# Patient Record
Sex: MALE | Race: WHITE | NOT HISPANIC OR LATINO | Employment: OTHER | URBAN - METROPOLITAN AREA
[De-identification: names, ages, dates, MRNs, and addresses within clinical notes are randomized per-mention and may not be internally consistent; named-entity substitution may affect disease eponyms.]

---

## 2021-11-07 ENCOUNTER — HOSPITAL ENCOUNTER (INPATIENT)
Facility: MEDICAL CENTER | Age: 56
LOS: 1 days | DRG: 637 | End: 2021-11-08
Attending: EMERGENCY MEDICINE | Admitting: INTERNAL MEDICINE
Payer: COMMERCIAL

## 2021-11-07 ENCOUNTER — APPOINTMENT (OUTPATIENT)
Dept: RADIOLOGY | Facility: MEDICAL CENTER | Age: 56
DRG: 637 | End: 2021-11-07
Attending: EMERGENCY MEDICINE
Payer: COMMERCIAL

## 2021-11-07 DIAGNOSIS — R10.12 LEFT UPPER QUADRANT ABDOMINAL PAIN: ICD-10-CM

## 2021-11-07 DIAGNOSIS — E10.10 DIABETIC KETOACIDOSIS WITHOUT COMA ASSOCIATED WITH TYPE 1 DIABETES MELLITUS (HCC): ICD-10-CM

## 2021-11-07 DIAGNOSIS — R19.7 NAUSEA VOMITING AND DIARRHEA: ICD-10-CM

## 2021-11-07 DIAGNOSIS — R11.2 NAUSEA VOMITING AND DIARRHEA: ICD-10-CM

## 2021-11-07 PROBLEM — E87.20 METABOLIC ACIDOSIS: Status: ACTIVE | Noted: 2021-11-07

## 2021-11-07 PROBLEM — Z91.148 NONCOMPLIANCE WITH MEDICATIONS: Status: ACTIVE | Noted: 2021-11-07

## 2021-11-07 PROBLEM — E11.00 DM HYPEROSMOLARITY TYPE II (HCC): Status: ACTIVE | Noted: 2021-11-07

## 2021-11-07 PROBLEM — D72.829 LEUKOCYTOSIS: Status: ACTIVE | Noted: 2021-11-07

## 2021-11-07 PROBLEM — E11.10 DKA (DIABETIC KETOACIDOSIS) (HCC): Status: ACTIVE | Noted: 2021-11-07

## 2021-11-07 PROBLEM — N18.30 CKD (CHRONIC KIDNEY DISEASE) STAGE 3, GFR 30-59 ML/MIN: Status: ACTIVE | Noted: 2021-11-07

## 2021-11-07 LAB
ALBUMIN SERPL BCP-MCNC: 3.8 G/DL (ref 3.2–4.9)
ALBUMIN/GLOB SERPL: 1.4 G/DL
ALP SERPL-CCNC: 101 U/L (ref 30–99)
ALT SERPL-CCNC: 30 U/L (ref 2–50)
ANION GAP SERPL CALC-SCNC: 30 MMOL/L (ref 7–16)
APPEARANCE UR: CLEAR
AST SERPL-CCNC: 18 U/L (ref 12–45)
BACTERIA #/AREA URNS HPF: ABNORMAL /HPF
BASOPHILS # BLD AUTO: 0.2 % (ref 0–1.8)
BASOPHILS # BLD: 0.03 K/UL (ref 0–0.12)
BILIRUB SERPL-MCNC: 0.6 MG/DL (ref 0.1–1.5)
BILIRUB UR QL STRIP.AUTO: ABNORMAL
BUN SERPL-MCNC: 26 MG/DL (ref 8–22)
CALCIUM SERPL-MCNC: 9.3 MG/DL (ref 8.4–10.2)
CHLORIDE SERPL-SCNC: 88 MMOL/L (ref 96–112)
CO2 SERPL-SCNC: 17 MMOL/L (ref 20–33)
COLOR UR: YELLOW
CREAT SERPL-MCNC: 1.39 MG/DL (ref 0.5–1.4)
EKG IMPRESSION: NORMAL
EOSINOPHIL # BLD AUTO: 0.02 K/UL (ref 0–0.51)
EOSINOPHIL NFR BLD: 0.2 % (ref 0–6.9)
EPI CELLS #/AREA URNS HPF: NEGATIVE /HPF
ERYTHROCYTE [DISTWIDTH] IN BLOOD BY AUTOMATED COUNT: 38.3 FL (ref 35.9–50)
GLOBULIN SER CALC-MCNC: 2.8 G/DL (ref 1.9–3.5)
GLUCOSE BLD-MCNC: 283 MG/DL (ref 65–99)
GLUCOSE SERPL-MCNC: 391 MG/DL (ref 65–99)
GLUCOSE UR STRIP.AUTO-MCNC: >=1000 MG/DL
HCT VFR BLD AUTO: 49.9 % (ref 42–52)
HGB BLD-MCNC: 16.5 G/DL (ref 14–18)
IMM GRANULOCYTES # BLD AUTO: 0.07 K/UL (ref 0–0.11)
IMM GRANULOCYTES NFR BLD AUTO: 0.5 % (ref 0–0.9)
KETONES UR STRIP.AUTO-MCNC: >=80 MG/DL
LEUKOCYTE ESTERASE UR QL STRIP.AUTO: NEGATIVE
LIPASE SERPL-CCNC: 11 U/L (ref 7–58)
LYMPHOCYTES # BLD AUTO: 1.17 K/UL (ref 1–4.8)
LYMPHOCYTES NFR BLD: 9 % (ref 22–41)
MAGNESIUM SERPL-MCNC: 2.1 MG/DL (ref 1.5–2.5)
MCH RBC QN AUTO: 27.8 PG (ref 27–33)
MCHC RBC AUTO-ENTMCNC: 33.1 G/DL (ref 33.7–35.3)
MCV RBC AUTO: 84.1 FL (ref 81.4–97.8)
MICRO URNS: ABNORMAL
MONOCYTES # BLD AUTO: 0.54 K/UL (ref 0–0.85)
MONOCYTES NFR BLD AUTO: 4.2 % (ref 0–13.4)
MUCOUS THREADS #/AREA URNS HPF: ABNORMAL /HPF
NEUTROPHILS # BLD AUTO: 11.1 K/UL (ref 1.82–7.42)
NEUTROPHILS NFR BLD: 85.9 % (ref 44–72)
NITRITE UR QL STRIP.AUTO: NEGATIVE
NRBC # BLD AUTO: 0 K/UL
NRBC BLD-RTO: 0 /100 WBC
PH UR STRIP.AUTO: 5 [PH] (ref 5–8)
PHOSPHATE SERPL-MCNC: 5.3 MG/DL (ref 2.5–4.5)
PLATELET # BLD AUTO: 265 K/UL (ref 164–446)
PMV BLD AUTO: 8.9 FL (ref 9–12.9)
POTASSIUM SERPL-SCNC: 4.1 MMOL/L (ref 3.6–5.5)
PROT SERPL-MCNC: 6.6 G/DL (ref 6–8.2)
PROT UR QL STRIP: NEGATIVE MG/DL
RBC # BLD AUTO: 5.93 M/UL (ref 4.7–6.1)
RBC # URNS HPF: ABNORMAL /HPF
RBC UR QL AUTO: ABNORMAL
SODIUM SERPL-SCNC: 135 MMOL/L (ref 135–145)
SP GR UR STRIP.AUTO: 1.02
TSH SERPL DL<=0.005 MIU/L-ACNC: 1.68 UIU/ML (ref 0.38–5.33)
WBC # BLD AUTO: 12.9 K/UL (ref 4.8–10.8)

## 2021-11-07 PROCEDURE — 85025 COMPLETE CBC W/AUTO DIFF WBC: CPT

## 2021-11-07 PROCEDURE — 81001 URINALYSIS AUTO W/SCOPE: CPT

## 2021-11-07 PROCEDURE — 700105 HCHG RX REV CODE 258: Performed by: INTERNAL MEDICINE

## 2021-11-07 PROCEDURE — 99285 EMERGENCY DEPT VISIT HI MDM: CPT

## 2021-11-07 PROCEDURE — 770020 HCHG ROOM/CARE - TELE (206)

## 2021-11-07 PROCEDURE — 700102 HCHG RX REV CODE 250 W/ 637 OVERRIDE(OP): Performed by: EMERGENCY MEDICINE

## 2021-11-07 PROCEDURE — 80053 COMPREHEN METABOLIC PANEL: CPT

## 2021-11-07 PROCEDURE — 700102 HCHG RX REV CODE 250 W/ 637 OVERRIDE(OP): Performed by: INTERNAL MEDICINE

## 2021-11-07 PROCEDURE — 93005 ELECTROCARDIOGRAM TRACING: CPT | Performed by: EMERGENCY MEDICINE

## 2021-11-07 PROCEDURE — 83735 ASSAY OF MAGNESIUM: CPT

## 2021-11-07 PROCEDURE — 74177 CT ABD & PELVIS W/CONTRAST: CPT

## 2021-11-07 PROCEDURE — 700105 HCHG RX REV CODE 258: Performed by: EMERGENCY MEDICINE

## 2021-11-07 PROCEDURE — 96376 TX/PRO/DX INJ SAME DRUG ADON: CPT

## 2021-11-07 PROCEDURE — 96372 THER/PROPH/DIAG INJ SC/IM: CPT

## 2021-11-07 PROCEDURE — 83690 ASSAY OF LIPASE: CPT

## 2021-11-07 PROCEDURE — 82962 GLUCOSE BLOOD TEST: CPT

## 2021-11-07 PROCEDURE — 84443 ASSAY THYROID STIM HORMONE: CPT

## 2021-11-07 PROCEDURE — 96375 TX/PRO/DX INJ NEW DRUG ADDON: CPT

## 2021-11-07 PROCEDURE — 96374 THER/PROPH/DIAG INJ IV PUSH: CPT

## 2021-11-07 PROCEDURE — 83036 HEMOGLOBIN GLYCOSYLATED A1C: CPT

## 2021-11-07 PROCEDURE — 700111 HCHG RX REV CODE 636 W/ 250 OVERRIDE (IP): Performed by: EMERGENCY MEDICINE

## 2021-11-07 PROCEDURE — 99223 1ST HOSP IP/OBS HIGH 75: CPT | Mod: AI | Performed by: INTERNAL MEDICINE

## 2021-11-07 PROCEDURE — 700117 HCHG RX CONTRAST REV CODE 255: Performed by: EMERGENCY MEDICINE

## 2021-11-07 PROCEDURE — 84100 ASSAY OF PHOSPHORUS: CPT

## 2021-11-07 PROCEDURE — 82010 KETONE BODYS QUAN: CPT

## 2021-11-07 RX ORDER — PROCHLORPERAZINE EDISYLATE 5 MG/ML
5-10 INJECTION INTRAMUSCULAR; INTRAVENOUS EVERY 4 HOURS PRN
Status: DISCONTINUED | OUTPATIENT
Start: 2021-11-07 | End: 2021-11-08 | Stop reason: HOSPADM

## 2021-11-07 RX ORDER — OXYCODONE HYDROCHLORIDE 5 MG/1
2.5 TABLET ORAL
Status: DISCONTINUED | OUTPATIENT
Start: 2021-11-07 | End: 2021-11-08 | Stop reason: HOSPADM

## 2021-11-07 RX ORDER — HYDROMORPHONE HYDROCHLORIDE 1 MG/ML
0.25 INJECTION, SOLUTION INTRAMUSCULAR; INTRAVENOUS; SUBCUTANEOUS
Status: DISCONTINUED | OUTPATIENT
Start: 2021-11-07 | End: 2021-11-08 | Stop reason: HOSPADM

## 2021-11-07 RX ORDER — PROMETHAZINE HYDROCHLORIDE 25 MG/1
12.5-25 SUPPOSITORY RECTAL EVERY 4 HOURS PRN
Status: DISCONTINUED | OUTPATIENT
Start: 2021-11-07 | End: 2021-11-08 | Stop reason: HOSPADM

## 2021-11-07 RX ORDER — MORPHINE SULFATE 4 MG/ML
4 INJECTION, SOLUTION INTRAMUSCULAR; INTRAVENOUS ONCE
Status: COMPLETED | OUTPATIENT
Start: 2021-11-07 | End: 2021-11-07

## 2021-11-07 RX ORDER — ONDANSETRON 2 MG/ML
4 INJECTION INTRAMUSCULAR; INTRAVENOUS ONCE
Status: COMPLETED | OUTPATIENT
Start: 2021-11-07 | End: 2021-11-07

## 2021-11-07 RX ORDER — POLYETHYLENE GLYCOL 3350 17 G/17G
1 POWDER, FOR SOLUTION ORAL
Status: DISCONTINUED | OUTPATIENT
Start: 2021-11-07 | End: 2021-11-08 | Stop reason: HOSPADM

## 2021-11-07 RX ORDER — AMOXICILLIN 250 MG
2 CAPSULE ORAL 2 TIMES DAILY
Status: DISCONTINUED | OUTPATIENT
Start: 2021-11-08 | End: 2021-11-08 | Stop reason: HOSPADM

## 2021-11-07 RX ORDER — SODIUM CHLORIDE, SODIUM LACTATE, POTASSIUM CHLORIDE, CALCIUM CHLORIDE 600; 310; 30; 20 MG/100ML; MG/100ML; MG/100ML; MG/100ML
2000 INJECTION, SOLUTION INTRAVENOUS ONCE
Status: COMPLETED | OUTPATIENT
Start: 2021-11-07 | End: 2021-11-07

## 2021-11-07 RX ORDER — HEPARIN SODIUM 5000 [USP'U]/ML
5000 INJECTION, SOLUTION INTRAVENOUS; SUBCUTANEOUS EVERY 8 HOURS
Status: DISCONTINUED | OUTPATIENT
Start: 2021-11-08 | End: 2021-11-08

## 2021-11-07 RX ORDER — DEXTROSE MONOHYDRATE 25 G/50ML
50 INJECTION, SOLUTION INTRAVENOUS
Status: DISCONTINUED | OUTPATIENT
Start: 2021-11-07 | End: 2021-11-08 | Stop reason: HOSPADM

## 2021-11-07 RX ORDER — PROMETHAZINE HYDROCHLORIDE 25 MG/1
12.5-25 TABLET ORAL EVERY 4 HOURS PRN
Status: DISCONTINUED | OUTPATIENT
Start: 2021-11-07 | End: 2021-11-08 | Stop reason: HOSPADM

## 2021-11-07 RX ORDER — OXYCODONE HYDROCHLORIDE 5 MG/1
5 TABLET ORAL
Status: DISCONTINUED | OUTPATIENT
Start: 2021-11-07 | End: 2021-11-08 | Stop reason: HOSPADM

## 2021-11-07 RX ORDER — ONDANSETRON 4 MG/1
4 TABLET, ORALLY DISINTEGRATING ORAL EVERY 4 HOURS PRN
Status: DISCONTINUED | OUTPATIENT
Start: 2021-11-07 | End: 2021-11-08 | Stop reason: HOSPADM

## 2021-11-07 RX ORDER — ONDANSETRON 2 MG/ML
4 INJECTION INTRAMUSCULAR; INTRAVENOUS EVERY 4 HOURS PRN
Status: DISCONTINUED | OUTPATIENT
Start: 2021-11-07 | End: 2021-11-08 | Stop reason: HOSPADM

## 2021-11-07 RX ORDER — SODIUM CHLORIDE, SODIUM LACTATE, POTASSIUM CHLORIDE, CALCIUM CHLORIDE 600; 310; 30; 20 MG/100ML; MG/100ML; MG/100ML; MG/100ML
INJECTION, SOLUTION INTRAVENOUS CONTINUOUS
Status: DISCONTINUED | OUTPATIENT
Start: 2021-11-07 | End: 2021-11-08 | Stop reason: HOSPADM

## 2021-11-07 RX ORDER — BISACODYL 10 MG
10 SUPPOSITORY, RECTAL RECTAL
Status: DISCONTINUED | OUTPATIENT
Start: 2021-11-07 | End: 2021-11-08 | Stop reason: HOSPADM

## 2021-11-07 RX ORDER — ACETAMINOPHEN 325 MG/1
650 TABLET ORAL EVERY 6 HOURS PRN
Status: DISCONTINUED | OUTPATIENT
Start: 2021-11-07 | End: 2021-11-08 | Stop reason: HOSPADM

## 2021-11-07 RX ADMIN — MORPHINE SULFATE 4 MG: 4 INJECTION INTRAVENOUS at 18:25

## 2021-11-07 RX ADMIN — INSULIN GLARGINE 10 UNITS: 100 INJECTION, SOLUTION SUBCUTANEOUS at 21:30

## 2021-11-07 RX ADMIN — IOHEXOL 100 ML: 350 INJECTION, SOLUTION INTRAVENOUS at 18:50

## 2021-11-07 RX ADMIN — SODIUM CHLORIDE, POTASSIUM CHLORIDE, SODIUM LACTATE AND CALCIUM CHLORIDE: 600; 310; 30; 20 INJECTION, SOLUTION INTRAVENOUS at 21:15

## 2021-11-07 RX ADMIN — INSULIN HUMAN 6 UNITS: 100 INJECTION, SOLUTION PARENTERAL at 19:04

## 2021-11-07 RX ADMIN — MORPHINE SULFATE 4 MG: 4 INJECTION INTRAVENOUS at 20:45

## 2021-11-07 RX ADMIN — SODIUM CHLORIDE, POTASSIUM CHLORIDE, SODIUM LACTATE AND CALCIUM CHLORIDE 2000 ML: 600; 310; 30; 20 INJECTION, SOLUTION INTRAVENOUS at 19:48

## 2021-11-07 RX ADMIN — ONDANSETRON 4 MG: 2 INJECTION INTRAMUSCULAR; INTRAVENOUS at 18:25

## 2021-11-07 RX ADMIN — SODIUM CHLORIDE, POTASSIUM CHLORIDE, SODIUM LACTATE AND CALCIUM CHLORIDE 2000 ML: 600; 310; 30; 20 INJECTION, SOLUTION INTRAVENOUS at 18:25

## 2021-11-07 ASSESSMENT — ENCOUNTER SYMPTOMS
EYE DISCHARGE: 0
MYALGIAS: 0
HEMOPTYSIS: 0
SPEECH CHANGE: 0
DEPRESSION: 0
SHORTNESS OF BREATH: 0
SENSORY CHANGE: 0
HEADACHES: 0
BLURRED VISION: 0
BLOOD IN STOOL: 0
BRUISES/BLEEDS EASILY: 0
FEVER: 0
VOMITING: 1
ABDOMINAL PAIN: 1
NAUSEA: 1
CHILLS: 0
HEARTBURN: 0
FLANK PAIN: 0
SORE THROAT: 0
DIZZINESS: 0
PALPITATIONS: 0
BACK PAIN: 0
CLAUDICATION: 0
PHOTOPHOBIA: 0
DOUBLE VISION: 0
DIARRHEA: 1
COUGH: 0
WHEEZING: 0
ORTHOPNEA: 0
SPUTUM PRODUCTION: 0
WEAKNESS: 0

## 2021-11-07 ASSESSMENT — COGNITIVE AND FUNCTIONAL STATUS - GENERAL
SUGGESTED CMS G CODE MODIFIER DAILY ACTIVITY: CH
SUGGESTED CMS G CODE MODIFIER MOBILITY: CH
MOBILITY SCORE: 24
DAILY ACTIVITIY SCORE: 24

## 2021-11-07 ASSESSMENT — PATIENT HEALTH QUESTIONNAIRE - PHQ9
1. LITTLE INTEREST OR PLEASURE IN DOING THINGS: NOT AT ALL
2. FEELING DOWN, DEPRESSED, IRRITABLE, OR HOPELESS: NOT AT ALL
SUM OF ALL RESPONSES TO PHQ9 QUESTIONS 1 AND 2: 0

## 2021-11-08 ENCOUNTER — APPOINTMENT (OUTPATIENT)
Dept: CARDIOLOGY | Facility: MEDICAL CENTER | Age: 56
DRG: 638 | End: 2021-11-08
Attending: INTERNAL MEDICINE
Payer: COMMERCIAL

## 2021-11-08 ENCOUNTER — APPOINTMENT (OUTPATIENT)
Dept: RADIOLOGY | Facility: MEDICAL CENTER | Age: 56
DRG: 638 | End: 2021-11-08
Attending: EMERGENCY MEDICINE
Payer: COMMERCIAL

## 2021-11-08 ENCOUNTER — APPOINTMENT (OUTPATIENT)
Dept: RADIOLOGY | Facility: MEDICAL CENTER | Age: 56
DRG: 637 | End: 2021-11-08
Attending: STUDENT IN AN ORGANIZED HEALTH CARE EDUCATION/TRAINING PROGRAM
Payer: COMMERCIAL

## 2021-11-08 ENCOUNTER — HOSPITAL ENCOUNTER (INPATIENT)
Facility: MEDICAL CENTER | Age: 56
LOS: 1 days | DRG: 638 | End: 2021-11-09
Attending: EMERGENCY MEDICINE | Admitting: STUDENT IN AN ORGANIZED HEALTH CARE EDUCATION/TRAINING PROGRAM
Payer: COMMERCIAL

## 2021-11-08 VITALS
RESPIRATION RATE: 20 BRPM | HEART RATE: 112 BPM | WEIGHT: 172.4 LBS | BODY MASS INDEX: 23.35 KG/M2 | HEIGHT: 72 IN | TEMPERATURE: 97.6 F | OXYGEN SATURATION: 98 % | DIASTOLIC BLOOD PRESSURE: 79 MMHG | SYSTOLIC BLOOD PRESSURE: 153 MMHG

## 2021-11-08 DIAGNOSIS — R07.9 CHEST PAIN IN ADULT: ICD-10-CM

## 2021-11-08 DIAGNOSIS — R10.13 EPIGASTRIC PAIN: ICD-10-CM

## 2021-11-08 DIAGNOSIS — E11.10 DIABETIC KETOACIDOSIS WITHOUT COMA ASSOCIATED WITH TYPE 2 DIABETES MELLITUS (HCC): ICD-10-CM

## 2021-11-08 DIAGNOSIS — R07.9 CHEST PAIN, UNSPECIFIED TYPE: ICD-10-CM

## 2021-11-08 PROBLEM — E11.00 DM HYPEROSMOLARITY TYPE II (HCC): Status: RESOLVED | Noted: 2021-11-07 | Resolved: 2021-11-08

## 2021-11-08 PROBLEM — I21.4 NSTEMI (NON-ST ELEVATED MYOCARDIAL INFARCTION) (HCC): Status: RESOLVED | Noted: 2021-11-08 | Resolved: 2021-11-08

## 2021-11-08 PROBLEM — I21.19 ACUTE MI, INFERIOR WALL (HCC): Status: ACTIVE | Noted: 2021-11-08

## 2021-11-08 PROBLEM — R94.31 ABNORMAL ELECTROCARDIOGRAM (ECG) (EKG): Chronic | Status: ACTIVE | Noted: 2021-11-08

## 2021-11-08 PROBLEM — E87.20 METABOLIC ACIDOSIS: Status: RESOLVED | Noted: 2021-11-07 | Resolved: 2021-11-08

## 2021-11-08 PROBLEM — N17.9 AKI (ACUTE KIDNEY INJURY) (HCC): Status: ACTIVE | Noted: 2021-11-08

## 2021-11-08 PROBLEM — I21.4 NSTEMI (NON-ST ELEVATED MYOCARDIAL INFARCTION) (HCC): Status: ACTIVE | Noted: 2021-11-08

## 2021-11-08 PROBLEM — I21.19 ACUTE MI, INFERIOR WALL (HCC): Status: RESOLVED | Noted: 2021-11-08 | Resolved: 2021-11-08

## 2021-11-08 LAB
ALBUMIN SERPL BCP-MCNC: 3.1 G/DL (ref 3.2–4.9)
ALBUMIN/GLOB SERPL: 1.1 G/DL
ALP SERPL-CCNC: 73 U/L (ref 30–99)
ALT SERPL-CCNC: 20 U/L (ref 2–50)
AMPHET UR QL SCN: POSITIVE
ANION GAP SERPL CALC-SCNC: 13 MMOL/L (ref 7–16)
ANION GAP SERPL CALC-SCNC: 15 MMOL/L (ref 7–16)
ANION GAP SERPL CALC-SCNC: 19 MMOL/L (ref 7–16)
ANION GAP SERPL CALC-SCNC: 27 MMOL/L (ref 7–16)
APTT PPP: 28.1 SEC (ref 24.7–36)
APTT PPP: 28.6 SEC (ref 24.7–36)
APTT PPP: 70.1 SEC (ref 24.7–36)
AST SERPL-CCNC: 12 U/L (ref 12–45)
B-OH-BUTYR SERPL-MCNC: >8 MMOL/L (ref 0.02–0.27)
BARBITURATES UR QL SCN: NEGATIVE
BASOPHILS # BLD AUTO: 0.1 % (ref 0–1.8)
BASOPHILS # BLD: 0.01 K/UL (ref 0–0.12)
BENZODIAZ UR QL SCN: NEGATIVE
BILIRUB SERPL-MCNC: 0.4 MG/DL (ref 0.1–1.5)
BUN SERPL-MCNC: 27 MG/DL (ref 8–22)
BUN SERPL-MCNC: 29 MG/DL (ref 8–22)
BUN SERPL-MCNC: 32 MG/DL (ref 8–22)
BUN SERPL-MCNC: 33 MG/DL (ref 8–22)
BZE UR QL SCN: NEGATIVE
CALCIUM SERPL-MCNC: 8.4 MG/DL (ref 8.5–10.5)
CALCIUM SERPL-MCNC: 8.6 MG/DL (ref 8.4–10.2)
CALCIUM SERPL-MCNC: 8.8 MG/DL (ref 8.4–10.2)
CALCIUM SERPL-MCNC: 8.8 MG/DL (ref 8.4–10.2)
CANNABINOIDS UR QL SCN: NEGATIVE
CHLORIDE SERPL-SCNC: 101 MMOL/L (ref 96–112)
CHLORIDE SERPL-SCNC: 95 MMOL/L (ref 96–112)
CHLORIDE SERPL-SCNC: 98 MMOL/L (ref 96–112)
CHLORIDE SERPL-SCNC: 99 MMOL/L (ref 96–112)
CO2 SERPL-SCNC: 17 MMOL/L (ref 20–33)
CO2 SERPL-SCNC: 20 MMOL/L (ref 20–33)
CO2 SERPL-SCNC: 26 MMOL/L (ref 20–33)
CO2 SERPL-SCNC: 27 MMOL/L (ref 20–33)
CREAT SERPL-MCNC: 0.96 MG/DL (ref 0.5–1.4)
CREAT SERPL-MCNC: 0.99 MG/DL (ref 0.5–1.4)
CREAT SERPL-MCNC: 1.07 MG/DL (ref 0.5–1.4)
CREAT SERPL-MCNC: 1.16 MG/DL (ref 0.5–1.4)
CREAT UR-MCNC: 82.65 MG/DL
EKG IMPRESSION: NORMAL
EOSINOPHIL # BLD AUTO: 0 K/UL (ref 0–0.51)
EOSINOPHIL NFR BLD: 0 % (ref 0–6.9)
ERYTHROCYTE [DISTWIDTH] IN BLOOD BY AUTOMATED COUNT: 38.6 FL (ref 35.9–50)
ERYTHROCYTE [DISTWIDTH] IN BLOOD BY AUTOMATED COUNT: 38.8 FL (ref 35.9–50)
EST. AVERAGE GLUCOSE BLD GHB EST-MCNC: 355 MG/DL
EST. AVERAGE GLUCOSE BLD GHB EST-MCNC: 418 MG/DL
GLOBULIN SER CALC-MCNC: 2.7 G/DL (ref 1.9–3.5)
GLUCOSE BLD-MCNC: 223 MG/DL (ref 65–99)
GLUCOSE BLD-MCNC: 238 MG/DL (ref 65–99)
GLUCOSE BLD-MCNC: 243 MG/DL (ref 65–99)
GLUCOSE BLD-MCNC: 295 MG/DL (ref 65–99)
GLUCOSE BLD-MCNC: 325 MG/DL (ref 65–99)
GLUCOSE SERPL-MCNC: 219 MG/DL (ref 65–99)
GLUCOSE SERPL-MCNC: 230 MG/DL (ref 65–99)
GLUCOSE SERPL-MCNC: 240 MG/DL (ref 65–99)
GLUCOSE SERPL-MCNC: 348 MG/DL (ref 65–99)
HBA1C MFR BLD: 14 % (ref 4–5.6)
HBA1C MFR BLD: 16.2 % (ref 4–5.6)
HCT VFR BLD AUTO: 39.7 % (ref 42–52)
HCT VFR BLD AUTO: 47.3 % (ref 42–52)
HGB BLD-MCNC: 13.6 G/DL (ref 14–18)
HGB BLD-MCNC: 15.5 G/DL (ref 14–18)
IMM GRANULOCYTES # BLD AUTO: 0.07 K/UL (ref 0–0.11)
IMM GRANULOCYTES NFR BLD AUTO: 0.5 % (ref 0–0.9)
INR PPP: 0.97 (ref 0.87–1.13)
INR PPP: 0.99 (ref 0.87–1.13)
INR PPP: 1.03 (ref 0.87–1.13)
LIPASE SERPL-CCNC: 10 U/L (ref 11–82)
LV EJECT FRACT  99904: 65
LV EJECT FRACT MOD 2C 99903: 69.02
LV EJECT FRACT MOD 4C 99902: 68.44
LV EJECT FRACT MOD BP 99901: 67.09
LYMPHOCYTES # BLD AUTO: 1.13 K/UL (ref 1–4.8)
LYMPHOCYTES NFR BLD: 8.5 % (ref 22–41)
MCH RBC QN AUTO: 28.2 PG (ref 27–33)
MCH RBC QN AUTO: 28.5 PG (ref 27–33)
MCHC RBC AUTO-ENTMCNC: 32.8 G/DL (ref 33.7–35.3)
MCHC RBC AUTO-ENTMCNC: 34.3 G/DL (ref 33.7–35.3)
MCV RBC AUTO: 83.2 FL (ref 81.4–97.8)
MCV RBC AUTO: 86 FL (ref 81.4–97.8)
METHADONE UR QL SCN: NEGATIVE
MONOCYTES # BLD AUTO: 0.99 K/UL (ref 0–0.85)
MONOCYTES NFR BLD AUTO: 7.4 % (ref 0–13.4)
NEUTROPHILS # BLD AUTO: 11.12 K/UL (ref 1.82–7.42)
NEUTROPHILS NFR BLD: 83.5 % (ref 44–72)
NRBC # BLD AUTO: 0 K/UL
NRBC BLD-RTO: 0 /100 WBC
NT-PROBNP SERPL IA-MCNC: 31 PG/ML (ref 0–125)
OPIATES UR QL SCN: POSITIVE
OXYCODONE UR QL SCN: NEGATIVE
PCP UR QL SCN: NEGATIVE
PLATELET # BLD AUTO: 231 K/UL (ref 164–446)
PLATELET # BLD AUTO: 235 K/UL (ref 164–446)
PMV BLD AUTO: 9 FL (ref 9–12.9)
PMV BLD AUTO: 9.1 FL (ref 9–12.9)
POTASSIUM SERPL-SCNC: 3.8 MMOL/L (ref 3.6–5.5)
POTASSIUM SERPL-SCNC: 4.1 MMOL/L (ref 3.6–5.5)
POTASSIUM SERPL-SCNC: 4.4 MMOL/L (ref 3.6–5.5)
POTASSIUM SERPL-SCNC: 4.5 MMOL/L (ref 3.6–5.5)
PROCALCITONIN SERPL-MCNC: 0.15 NG/ML
PROPOXYPH UR QL SCN: NEGATIVE
PROT SERPL-MCNC: 5.8 G/DL (ref 6–8.2)
PROTHROMBIN TIME: 12.6 SEC (ref 12–14.6)
PROTHROMBIN TIME: 12.7 SEC (ref 12–14.6)
PROTHROMBIN TIME: 12.8 SEC (ref 12–14.6)
RBC # BLD AUTO: 4.77 M/UL (ref 4.7–6.1)
RBC # BLD AUTO: 5.5 M/UL (ref 4.7–6.1)
SODIUM SERPL-SCNC: 137 MMOL/L (ref 135–145)
SODIUM SERPL-SCNC: 138 MMOL/L (ref 135–145)
SODIUM SERPL-SCNC: 139 MMOL/L (ref 135–145)
SODIUM SERPL-SCNC: 143 MMOL/L (ref 135–145)
SODIUM UR-SCNC: 20 MMOL/L
TROPONIN T SERPL-MCNC: 22 NG/L (ref 6–19)
TROPONIN T SERPL-MCNC: 26 NG/L (ref 6–19)
TROPONIN T SERPL-MCNC: 26 NG/L (ref 6–19)
UFH PPP CHRO-ACNC: 0.35 IU/ML
UFH PPP CHRO-ACNC: <0.1 IU/ML
UFH PPP CHRO-ACNC: <0.1 IU/ML
WBC # BLD AUTO: 13.3 K/UL (ref 4.8–10.8)
WBC # BLD AUTO: 13.3 K/UL (ref 4.8–10.8)

## 2021-11-08 PROCEDURE — 93005 ELECTROCARDIOGRAM TRACING: CPT

## 2021-11-08 PROCEDURE — 99222 1ST HOSP IP/OBS MODERATE 55: CPT | Performed by: INTERNAL MEDICINE

## 2021-11-08 PROCEDURE — 85520 HEPARIN ASSAY: CPT | Mod: 91

## 2021-11-08 PROCEDURE — 82570 ASSAY OF URINE CREATININE: CPT

## 2021-11-08 PROCEDURE — 93005 ELECTROCARDIOGRAM TRACING: CPT | Performed by: EMERGENCY MEDICINE

## 2021-11-08 PROCEDURE — 85610 PROTHROMBIN TIME: CPT

## 2021-11-08 PROCEDURE — 700111 HCHG RX REV CODE 636 W/ 250 OVERRIDE (IP): Performed by: INTERNAL MEDICINE

## 2021-11-08 PROCEDURE — 99223 1ST HOSP IP/OBS HIGH 75: CPT | Performed by: STUDENT IN AN ORGANIZED HEALTH CARE EDUCATION/TRAINING PROGRAM

## 2021-11-08 PROCEDURE — A9270 NON-COVERED ITEM OR SERVICE: HCPCS | Performed by: STUDENT IN AN ORGANIZED HEALTH CARE EDUCATION/TRAINING PROGRAM

## 2021-11-08 PROCEDURE — 96366 THER/PROPH/DIAG IV INF ADDON: CPT

## 2021-11-08 PROCEDURE — A9270 NON-COVERED ITEM OR SERVICE: HCPCS

## 2021-11-08 PROCEDURE — 80048 BASIC METABOLIC PNL TOTAL CA: CPT

## 2021-11-08 PROCEDURE — 84484 ASSAY OF TROPONIN QUANT: CPT | Mod: 91

## 2021-11-08 PROCEDURE — 99291 CRITICAL CARE FIRST HOUR: CPT | Performed by: STUDENT IN AN ORGANIZED HEALTH CARE EDUCATION/TRAINING PROGRAM

## 2021-11-08 PROCEDURE — 99285 EMERGENCY DEPT VISIT HI MDM: CPT

## 2021-11-08 PROCEDURE — 85730 THROMBOPLASTIN TIME PARTIAL: CPT | Mod: 91

## 2021-11-08 PROCEDURE — 85027 COMPLETE CBC AUTOMATED: CPT

## 2021-11-08 PROCEDURE — 83880 ASSAY OF NATRIURETIC PEPTIDE: CPT

## 2021-11-08 PROCEDURE — 85025 COMPLETE CBC W/AUTO DIFF WBC: CPT

## 2021-11-08 PROCEDURE — 700111 HCHG RX REV CODE 636 W/ 250 OVERRIDE (IP): Performed by: STUDENT IN AN ORGANIZED HEALTH CARE EDUCATION/TRAINING PROGRAM

## 2021-11-08 PROCEDURE — 84300 ASSAY OF URINE SODIUM: CPT

## 2021-11-08 PROCEDURE — 770020 HCHG ROOM/CARE - TELE (206)

## 2021-11-08 PROCEDURE — 84145 PROCALCITONIN (PCT): CPT

## 2021-11-08 PROCEDURE — 700105 HCHG RX REV CODE 258: Performed by: INTERNAL MEDICINE

## 2021-11-08 PROCEDURE — 700102 HCHG RX REV CODE 250 W/ 637 OVERRIDE(OP): Performed by: STUDENT IN AN ORGANIZED HEALTH CARE EDUCATION/TRAINING PROGRAM

## 2021-11-08 PROCEDURE — 700105 HCHG RX REV CODE 258: Performed by: STUDENT IN AN ORGANIZED HEALTH CARE EDUCATION/TRAINING PROGRAM

## 2021-11-08 PROCEDURE — 85610 PROTHROMBIN TIME: CPT | Mod: 91

## 2021-11-08 PROCEDURE — 82962 GLUCOSE BLOOD TEST: CPT

## 2021-11-08 PROCEDURE — 82962 GLUCOSE BLOOD TEST: CPT | Mod: 91

## 2021-11-08 PROCEDURE — 84484 ASSAY OF TROPONIN QUANT: CPT

## 2021-11-08 PROCEDURE — 93306 TTE W/DOPPLER COMPLETE: CPT | Mod: 26 | Performed by: INTERNAL MEDICINE

## 2021-11-08 PROCEDURE — 85520 HEPARIN ASSAY: CPT

## 2021-11-08 PROCEDURE — 93306 TTE W/DOPPLER COMPLETE: CPT

## 2021-11-08 PROCEDURE — 93010 ELECTROCARDIOGRAM REPORT: CPT | Mod: 76 | Performed by: INTERNAL MEDICINE

## 2021-11-08 PROCEDURE — 83690 ASSAY OF LIPASE: CPT

## 2021-11-08 PROCEDURE — 80307 DRUG TEST PRSMV CHEM ANLYZR: CPT

## 2021-11-08 PROCEDURE — 71045 X-RAY EXAM CHEST 1 VIEW: CPT

## 2021-11-08 PROCEDURE — 85730 THROMBOPLASTIN TIME PARTIAL: CPT

## 2021-11-08 PROCEDURE — 700102 HCHG RX REV CODE 250 W/ 637 OVERRIDE(OP)

## 2021-11-08 PROCEDURE — 93005 ELECTROCARDIOGRAM TRACING: CPT | Performed by: STUDENT IN AN ORGANIZED HEALTH CARE EDUCATION/TRAINING PROGRAM

## 2021-11-08 PROCEDURE — 83036 HEMOGLOBIN GLYCOSYLATED A1C: CPT

## 2021-11-08 PROCEDURE — 96375 TX/PRO/DX INJ NEW DRUG ADDON: CPT

## 2021-11-08 PROCEDURE — 80053 COMPREHEN METABOLIC PANEL: CPT

## 2021-11-08 PROCEDURE — 96365 THER/PROPH/DIAG IV INF INIT: CPT

## 2021-11-08 RX ORDER — HEPARIN SODIUM 1000 [USP'U]/ML
2000 INJECTION, SOLUTION INTRAVENOUS; SUBCUTANEOUS PRN
Status: DISCONTINUED | OUTPATIENT
Start: 2021-11-08 | End: 2021-11-08

## 2021-11-08 RX ORDER — ATORVASTATIN CALCIUM 40 MG/1
40 TABLET, FILM COATED ORAL EVERY EVENING
Status: DISCONTINUED | OUTPATIENT
Start: 2021-11-08 | End: 2021-11-08 | Stop reason: HOSPADM

## 2021-11-08 RX ORDER — HEPARIN SODIUM 5000 [USP'U]/100ML
0-30 INJECTION, SOLUTION INTRAVENOUS CONTINUOUS
Status: DISCONTINUED | OUTPATIENT
Start: 2021-11-08 | End: 2021-11-08

## 2021-11-08 RX ORDER — BISACODYL 10 MG
10 SUPPOSITORY, RECTAL RECTAL
Status: DISCONTINUED | OUTPATIENT
Start: 2021-11-08 | End: 2021-11-08

## 2021-11-08 RX ORDER — SODIUM CHLORIDE 9 MG/ML
INJECTION, SOLUTION INTRAVENOUS CONTINUOUS
Status: DISCONTINUED | OUTPATIENT
Start: 2021-11-08 | End: 2021-11-09

## 2021-11-08 RX ORDER — HEPARIN SODIUM 1000 [USP'U]/ML
4000 INJECTION, SOLUTION INTRAVENOUS; SUBCUTANEOUS ONCE
Status: DISCONTINUED | OUTPATIENT
Start: 2021-11-08 | End: 2021-11-08

## 2021-11-08 RX ORDER — AMOXICILLIN 250 MG
2 CAPSULE ORAL 2 TIMES DAILY
Status: DISCONTINUED | OUTPATIENT
Start: 2021-11-08 | End: 2021-11-08

## 2021-11-08 RX ORDER — LIDOCAINE HYDROCHLORIDE 20 MG/ML
INJECTION, SOLUTION INFILTRATION; PERINEURAL
Status: COMPLETED
Start: 2021-11-08 | End: 2021-11-08

## 2021-11-08 RX ORDER — ATORVASTATIN CALCIUM 40 MG/1
40 TABLET, FILM COATED ORAL EVERY EVENING
Status: DISCONTINUED | OUTPATIENT
Start: 2021-11-08 | End: 2021-11-09 | Stop reason: HOSPADM

## 2021-11-08 RX ORDER — DEXTROSE MONOHYDRATE 25 G/50ML
50 INJECTION, SOLUTION INTRAVENOUS
Status: CANCELLED | OUTPATIENT
Start: 2021-11-08

## 2021-11-08 RX ORDER — ACETAMINOPHEN 325 MG/1
650 TABLET ORAL EVERY 6 HOURS PRN
Status: DISCONTINUED | OUTPATIENT
Start: 2021-11-08 | End: 2021-11-08

## 2021-11-08 RX ORDER — VERAPAMIL HYDROCHLORIDE 2.5 MG/ML
INJECTION, SOLUTION INTRAVENOUS
Status: COMPLETED
Start: 2021-11-08 | End: 2021-11-08

## 2021-11-08 RX ORDER — PROMETHAZINE HYDROCHLORIDE 25 MG/1
12.5-25 TABLET ORAL EVERY 4 HOURS PRN
Status: CANCELLED | OUTPATIENT
Start: 2021-11-08

## 2021-11-08 RX ORDER — SODIUM CHLORIDE, SODIUM LACTATE, POTASSIUM CHLORIDE, AND CALCIUM CHLORIDE .6; .31; .03; .02 G/100ML; G/100ML; G/100ML; G/100ML
INJECTION, SOLUTION INTRAVENOUS
Status: COMPLETED | OUTPATIENT
Start: 2021-11-08 | End: 2021-11-08

## 2021-11-08 RX ORDER — HEPARIN SODIUM 5000 [USP'U]/100ML
0-30 INJECTION, SOLUTION INTRAVENOUS CONTINUOUS
Status: DISCONTINUED | OUTPATIENT
Start: 2021-11-08 | End: 2021-11-09

## 2021-11-08 RX ORDER — HEPARIN SODIUM 1000 [USP'U]/ML
2000 INJECTION, SOLUTION INTRAVENOUS; SUBCUTANEOUS PRN
Status: DISCONTINUED | OUTPATIENT
Start: 2021-11-08 | End: 2021-11-09

## 2021-11-08 RX ORDER — SODIUM CHLORIDE, SODIUM LACTATE, POTASSIUM CHLORIDE, CALCIUM CHLORIDE 600; 310; 30; 20 MG/100ML; MG/100ML; MG/100ML; MG/100ML
INJECTION, SOLUTION INTRAVENOUS CONTINUOUS
Status: DISCONTINUED | OUTPATIENT
Start: 2021-11-08 | End: 2021-11-08

## 2021-11-08 RX ORDER — ATORVASTATIN CALCIUM 40 MG/1
40 TABLET, FILM COATED ORAL EVERY EVENING
Status: DISCONTINUED | OUTPATIENT
Start: 2021-11-09 | End: 2021-11-08

## 2021-11-08 RX ORDER — PROCHLORPERAZINE EDISYLATE 5 MG/ML
5-10 INJECTION INTRAMUSCULAR; INTRAVENOUS EVERY 4 HOURS PRN
Status: DISCONTINUED | OUTPATIENT
Start: 2021-11-08 | End: 2021-11-08

## 2021-11-08 RX ORDER — AMOXICILLIN 250 MG
2 CAPSULE ORAL 2 TIMES DAILY
Status: CANCELLED | OUTPATIENT
Start: 2021-11-08

## 2021-11-08 RX ORDER — HEPARIN SODIUM 5000 [USP'U]/100ML
0-30 INJECTION, SOLUTION INTRAVENOUS CONTINUOUS
Status: DISCONTINUED | OUTPATIENT
Start: 2021-11-08 | End: 2021-11-08 | Stop reason: HOSPADM

## 2021-11-08 RX ORDER — HEPARIN SODIUM 1000 [USP'U]/ML
2000 INJECTION, SOLUTION INTRAVENOUS; SUBCUTANEOUS PRN
Status: CANCELLED | OUTPATIENT
Start: 2021-11-08

## 2021-11-08 RX ORDER — HEPARIN SODIUM 1000 [USP'U]/ML
2000 INJECTION, SOLUTION INTRAVENOUS; SUBCUTANEOUS PRN
Status: DISCONTINUED | OUTPATIENT
Start: 2021-11-08 | End: 2021-11-08 | Stop reason: HOSPADM

## 2021-11-08 RX ORDER — SODIUM CHLORIDE, SODIUM LACTATE, POTASSIUM CHLORIDE, CALCIUM CHLORIDE 600; 310; 30; 20 MG/100ML; MG/100ML; MG/100ML; MG/100ML
INJECTION, SOLUTION INTRAVENOUS CONTINUOUS
Status: CANCELLED | OUTPATIENT
Start: 2021-11-08

## 2021-11-08 RX ORDER — DEXTROSE MONOHYDRATE 25 G/50ML
50 INJECTION, SOLUTION INTRAVENOUS
Status: DISCONTINUED | OUTPATIENT
Start: 2021-11-08 | End: 2021-11-09 | Stop reason: HOSPADM

## 2021-11-08 RX ORDER — HEPARIN SODIUM 200 [USP'U]/100ML
INJECTION, SOLUTION INTRAVENOUS
Status: COMPLETED
Start: 2021-11-08 | End: 2021-11-08

## 2021-11-08 RX ORDER — HEPARIN SODIUM 1000 [USP'U]/ML
INJECTION, SOLUTION INTRAVENOUS; SUBCUTANEOUS
Status: COMPLETED
Start: 2021-11-08 | End: 2021-11-08

## 2021-11-08 RX ORDER — ASPIRIN 81 MG/1
324 TABLET, CHEWABLE ORAL ONCE
Status: DISCONTINUED | OUTPATIENT
Start: 2021-11-08 | End: 2021-11-08

## 2021-11-08 RX ORDER — SODIUM CHLORIDE, SODIUM LACTATE, POTASSIUM CHLORIDE, AND CALCIUM CHLORIDE .6; .31; .03; .02 G/100ML; G/100ML; G/100ML; G/100ML
1000 INJECTION, SOLUTION INTRAVENOUS ONCE
Status: COMPLETED | OUTPATIENT
Start: 2021-11-08 | End: 2021-11-08

## 2021-11-08 RX ORDER — SODIUM CHLORIDE 9 MG/ML
1000 INJECTION, SOLUTION INTRAVENOUS ONCE
Status: DISCONTINUED | OUTPATIENT
Start: 2021-11-08 | End: 2021-11-08 | Stop reason: HOSPADM

## 2021-11-08 RX ORDER — PROMETHAZINE HYDROCHLORIDE 12.5 MG/1
12.5-25 SUPPOSITORY RECTAL EVERY 4 HOURS PRN
Status: DISCONTINUED | OUTPATIENT
Start: 2021-11-08 | End: 2021-11-08

## 2021-11-08 RX ORDER — BISACODYL 10 MG
10 SUPPOSITORY, RECTAL RECTAL
Status: CANCELLED | OUTPATIENT
Start: 2021-11-08

## 2021-11-08 RX ORDER — MORPHINE SULFATE 4 MG/ML
2-4 INJECTION, SOLUTION INTRAMUSCULAR; INTRAVENOUS
Status: DISCONTINUED | OUTPATIENT
Start: 2021-11-08 | End: 2021-11-09 | Stop reason: HOSPADM

## 2021-11-08 RX ORDER — HEPARIN SODIUM 5000 [USP'U]/100ML
0-30 INJECTION, SOLUTION INTRAVENOUS CONTINUOUS
Status: CANCELLED | OUTPATIENT
Start: 2021-11-08

## 2021-11-08 RX ORDER — ACETAMINOPHEN 325 MG/1
650 TABLET ORAL EVERY 4 HOURS PRN
Status: DISCONTINUED | OUTPATIENT
Start: 2021-11-08 | End: 2021-11-09 | Stop reason: HOSPADM

## 2021-11-08 RX ORDER — POLYETHYLENE GLYCOL 3350 17 G/17G
1 POWDER, FOR SOLUTION ORAL
Status: CANCELLED | OUTPATIENT
Start: 2021-11-08

## 2021-11-08 RX ORDER — NITROGLYCERIN 0.4 MG/1
0.4 TABLET SUBLINGUAL
Status: DISCONTINUED | OUTPATIENT
Start: 2021-11-08 | End: 2021-11-09 | Stop reason: HOSPADM

## 2021-11-08 RX ORDER — PROMETHAZINE HYDROCHLORIDE 25 MG/1
12.5-25 TABLET ORAL EVERY 4 HOURS PRN
Status: DISCONTINUED | OUTPATIENT
Start: 2021-11-08 | End: 2021-11-08

## 2021-11-08 RX ORDER — ASPIRIN 325 MG
325 TABLET ORAL DAILY
Status: DISCONTINUED | OUTPATIENT
Start: 2021-11-09 | End: 2021-11-09 | Stop reason: HOSPADM

## 2021-11-08 RX ORDER — ACETAMINOPHEN 325 MG/1
650 TABLET ORAL EVERY 6 HOURS PRN
Status: CANCELLED | OUTPATIENT
Start: 2021-11-08

## 2021-11-08 RX ORDER — ONDANSETRON 4 MG/1
4 TABLET, ORALLY DISINTEGRATING ORAL EVERY 4 HOURS PRN
Status: DISCONTINUED | OUTPATIENT
Start: 2021-11-08 | End: 2021-11-08

## 2021-11-08 RX ORDER — HEPARIN SODIUM 1000 [USP'U]/ML
4000 INJECTION, SOLUTION INTRAVENOUS; SUBCUTANEOUS ONCE
Status: COMPLETED | OUTPATIENT
Start: 2021-11-08 | End: 2021-11-08

## 2021-11-08 RX ORDER — ONDANSETRON 2 MG/ML
4 INJECTION INTRAMUSCULAR; INTRAVENOUS EVERY 4 HOURS PRN
Status: CANCELLED | OUTPATIENT
Start: 2021-11-08

## 2021-11-08 RX ORDER — ONDANSETRON 4 MG/1
4 TABLET, ORALLY DISINTEGRATING ORAL EVERY 4 HOURS PRN
Status: CANCELLED | OUTPATIENT
Start: 2021-11-08

## 2021-11-08 RX ORDER — ONDANSETRON 2 MG/ML
4 INJECTION INTRAMUSCULAR; INTRAVENOUS EVERY 4 HOURS PRN
Status: DISCONTINUED | OUTPATIENT
Start: 2021-11-08 | End: 2021-11-09 | Stop reason: HOSPADM

## 2021-11-08 RX ORDER — ONDANSETRON 2 MG/ML
4 INJECTION INTRAMUSCULAR; INTRAVENOUS EVERY 4 HOURS PRN
Status: DISCONTINUED | OUTPATIENT
Start: 2021-11-08 | End: 2021-11-08

## 2021-11-08 RX ORDER — ATORVASTATIN CALCIUM 40 MG/1
40 TABLET, FILM COATED ORAL EVERY EVENING
Status: CANCELLED | OUTPATIENT
Start: 2021-11-08

## 2021-11-08 RX ORDER — PROCHLORPERAZINE EDISYLATE 5 MG/ML
5-10 INJECTION INTRAMUSCULAR; INTRAVENOUS EVERY 4 HOURS PRN
Status: CANCELLED | OUTPATIENT
Start: 2021-11-08

## 2021-11-08 RX ORDER — DEXTROSE MONOHYDRATE 25 G/50ML
50 INJECTION, SOLUTION INTRAVENOUS
Status: DISCONTINUED | OUTPATIENT
Start: 2021-11-08 | End: 2021-11-08

## 2021-11-08 RX ORDER — PROMETHAZINE HYDROCHLORIDE 25 MG/1
12.5-25 SUPPOSITORY RECTAL EVERY 4 HOURS PRN
Status: CANCELLED | OUTPATIENT
Start: 2021-11-08

## 2021-11-08 RX ORDER — POLYETHYLENE GLYCOL 3350 17 G/17G
1 POWDER, FOR SOLUTION ORAL
Status: DISCONTINUED | OUTPATIENT
Start: 2021-11-08 | End: 2021-11-08

## 2021-11-08 RX ORDER — ASPIRIN 81 MG/1
324 TABLET, CHEWABLE ORAL ONCE
Status: CANCELLED | OUTPATIENT
Start: 2021-11-08 | End: 2021-11-08

## 2021-11-08 RX ORDER — ASPIRIN 81 MG/1
TABLET, CHEWABLE ORAL
Status: COMPLETED
Start: 2021-11-08 | End: 2021-11-08

## 2021-11-08 RX ORDER — SODIUM CHLORIDE 9 MG/ML
INJECTION, SOLUTION INTRAVENOUS CONTINUOUS
Status: DISCONTINUED | OUTPATIENT
Start: 2021-11-08 | End: 2021-11-08

## 2021-11-08 RX ORDER — HEPARIN SODIUM 1000 [USP'U]/ML
4000 INJECTION, SOLUTION INTRAVENOUS; SUBCUTANEOUS ONCE
Status: DISCONTINUED | OUTPATIENT
Start: 2021-11-08 | End: 2021-11-08 | Stop reason: HOSPADM

## 2021-11-08 RX ORDER — HEPARIN SODIUM 1000 [USP'U]/ML
4000 INJECTION, SOLUTION INTRAVENOUS; SUBCUTANEOUS ONCE
Status: CANCELLED | OUTPATIENT
Start: 2021-11-08 | End: 2021-11-09

## 2021-11-08 RX ADMIN — INSULIN HUMAN 4 UNITS: 100 INJECTION, SOLUTION PARENTERAL at 21:01

## 2021-11-08 RX ADMIN — INSULIN GLARGINE 20 UNITS: 100 INJECTION, SOLUTION SUBCUTANEOUS at 20:58

## 2021-11-08 RX ADMIN — SODIUM CHLORIDE, POTASSIUM CHLORIDE, SODIUM LACTATE AND CALCIUM CHLORIDE 1 L: 600; 310; 30; 20 INJECTION, SOLUTION INTRAVENOUS at 14:04

## 2021-11-08 RX ADMIN — MORPHINE SULFATE 4 MG: 4 INJECTION INTRAVENOUS at 22:11

## 2021-11-08 RX ADMIN — SODIUM CHLORIDE, POTASSIUM CHLORIDE, SODIUM LACTATE AND CALCIUM CHLORIDE: 600; 310; 30; 20 INJECTION, SOLUTION INTRAVENOUS at 04:00

## 2021-11-08 RX ADMIN — HEPARIN SODIUM 5000 UNITS: 5000 INJECTION, SOLUTION INTRAVENOUS; SUBCUTANEOUS at 05:42

## 2021-11-08 RX ADMIN — ASPIRIN 81 MG CHEWABLE TABLET 324 MG: 81 TABLET CHEWABLE at 14:10

## 2021-11-08 RX ADMIN — HEPARIN SODIUM 12 UNITS/KG/HR: 5000 INJECTION, SOLUTION INTRAVENOUS at 15:52

## 2021-11-08 RX ADMIN — SODIUM CHLORIDE, POTASSIUM CHLORIDE, SODIUM LACTATE AND CALCIUM CHLORIDE 1000 ML: 600; 310; 30; 20 INJECTION, SOLUTION INTRAVENOUS at 09:24

## 2021-11-08 RX ADMIN — HEPARIN SODIUM 12 UNITS/KG/HR: 5000 INJECTION, SOLUTION INTRAVENOUS at 18:59

## 2021-11-08 RX ADMIN — HEPARIN SODIUM 4000 UNITS: 1000 INJECTION, SOLUTION INTRAVENOUS; SUBCUTANEOUS at 15:54

## 2021-11-08 RX ADMIN — ASPIRIN 81 MG CHEWABLE TABLET 324 MG: 81 TABLET CHEWABLE at 14:08

## 2021-11-08 RX ADMIN — HYDROMORPHONE HYDROCHLORIDE 0.25 MG: 1 INJECTION, SOLUTION INTRAMUSCULAR; INTRAVENOUS; SUBCUTANEOUS at 00:48

## 2021-11-08 RX ADMIN — HEPARIN SODIUM 5000 UNITS: 5000 INJECTION, SOLUTION INTRAVENOUS; SUBCUTANEOUS at 13:11

## 2021-11-08 RX ADMIN — SODIUM CHLORIDE: 9 INJECTION, SOLUTION INTRAVENOUS at 20:48

## 2021-11-08 ASSESSMENT — FIBROSIS 4 INDEX
FIB4 SCORE: 0.66
FIB4 SCORE: 0.66
FIB4 SCORE: 0.8
FIB4 SCORE: 0.8

## 2021-11-08 ASSESSMENT — ENCOUNTER SYMPTOMS
SHORTNESS OF BREATH: 0
FEVER: 0
VOMITING: 1
COUGH: 0
NAUSEA: 1
PALPITATIONS: 0
DIAPHORESIS: 0

## 2021-11-08 ASSESSMENT — PAIN DESCRIPTION - PAIN TYPE
TYPE: ACUTE PAIN

## 2021-11-08 ASSESSMENT — PATIENT HEALTH QUESTIONNAIRE - PHQ9
SUM OF ALL RESPONSES TO PHQ9 QUESTIONS 1 AND 2: 0
2. FEELING DOWN, DEPRESSED, IRRITABLE, OR HOPELESS: NOT AT ALL
1. LITTLE INTEREST OR PLEASURE IN DOING THINGS: NOT AT ALL

## 2021-11-08 NOTE — ASSESSMENT & PLAN NOTE
Pending A1c  Patient with poor medical insight, unable to recall insulin regimen has poor outpatient follow-up  Current episode likely secondary to noncompliance  No evidence of infectious cause at this time  Continue insulin therapy and aggressive IV volume resuscitation  Monitor electrolytes replace as needed  Pending results of urinalysis and ketones  Pending TSH

## 2021-11-08 NOTE — H&P
Hospital Medicine History & Physical Note    Date of Service  11/7/2021    Primary Care Physician  Pcp Pt States None    Consultants  none        Code Status  Full Code    Chief Complaint  Chief Complaint   Patient presents with   • Abdominal Pain     [facility]IB [facility] report of nausea, vomiting, diarrhea and left upper quadrant pain for the past 3 days.  No blood noted in the emesis or diarrhea.  PTA  GCS 15 18 g RAC, Fent 50 mcg IV, Phenergan 12.5 mg IM    • N/V   • Diarrhea       History of Presenting Illness  Kevin Taylor is a 56 y.o. male who presented 11/7/2021 with past medical history of CKD and poorly controlled type 2 diabetes mellitus who presents to the ED with a chief complaint of nausea/vomiting.  The patient was apparently well until 4 days prior to admission when he developed diarrhea.  He denied any fever/chills or recent travel.  This diarrhea then progressed to intractable nausea and vomiting associated with epigastric pain thus prompting consult at her institution.  Upon arrival to the ED he was found to have elevated gap metabolic acidosis secondary to DKA.  CT scan of the abdomen does not show any acute process.  Patient denies any symptomatology suggestive of infectious cause.  Patient is a poor historian and has poor insight into his medical condition.  He is unable to tell me his insulin regimen and states that he has not been compliant for several days.  Patient found to be severely volume depleted requiring IV volume resuscitation.  Patient will be admitted for further evaluation and management of DKA.    I discussed the plan of care with patient.    Review of Systems  Review of Systems   Constitutional: Positive for malaise/fatigue. Negative for chills and fever.   HENT: Negative for congestion, hearing loss, sore throat and tinnitus.    Eyes: Negative for blurred vision, double vision, photophobia and discharge.   Respiratory: Negative for cough, hemoptysis, sputum production, shortness  of breath and wheezing.    Cardiovascular: Negative for chest pain, palpitations, orthopnea, claudication and leg swelling.   Gastrointestinal: Positive for abdominal pain, diarrhea, nausea and vomiting. Negative for blood in stool, heartburn and melena.   Genitourinary: Negative for dysuria, flank pain, hematuria and urgency.   Musculoskeletal: Negative for back pain, joint pain and myalgias.   Skin: Negative for itching and rash.   Neurological: Negative for dizziness, sensory change, speech change, weakness and headaches.   Endo/Heme/Allergies: Does not bruise/bleed easily.   Psychiatric/Behavioral: Negative for depression and suicidal ideas.       Past Medical History   has a past medical history of Diabetes (HCC) and Hypercholesteremia.    Surgical History   has no past surgical history on file.     Family History    Family history reviewed with patient. There is no family history that is pertinent to the chief complaint.     Social History   reports that he has never smoked. He has never used smokeless tobacco. He reports previous alcohol use. He reports previous drug use.    Allergies  No Known Allergies    Medications  None       Physical Exam  Temp:  [36.3 °C (97.3 °F)-36.7 °C (98.1 °F)] 36.3 °C (97.3 °F)  Pulse:  [] 101  Resp:  [16-25] 16  BP: (101-119)/(70-76) 112/76  SpO2:  [96 %-100 %] 97 %  Blood Pressure: 112/76   Temperature: 36.3 °C (97.3 °F)   Pulse: (!) 101   Respiration: 16   Pulse Oximetry: 97 %       Physical Exam  Vitals reviewed.   Constitutional:       Appearance: Normal appearance.   HENT:      Head: Normocephalic and atraumatic.      Nose: No congestion or rhinorrhea.      Mouth/Throat:      Mouth: Mucous membranes are moist.      Pharynx: Oropharynx is clear.   Eyes:      General: No scleral icterus.     Extraocular Movements: Extraocular movements intact.      Conjunctiva/sclera: Conjunctivae normal.      Pupils: Pupils are equal, round, and reactive to light.   Cardiovascular:       Rate and Rhythm: Normal rate and regular rhythm.      Pulses: Normal pulses.      Heart sounds: Normal heart sounds. No murmur heard.  No friction rub. No gallop.    Pulmonary:      Effort: Pulmonary effort is normal. No respiratory distress.      Breath sounds: Normal breath sounds. No stridor. No wheezing, rhonchi or rales.   Abdominal:      General: Abdomen is flat. Bowel sounds are normal. There is no distension.      Palpations: Abdomen is soft. There is no mass.      Tenderness: There is no abdominal tenderness. There is no guarding or rebound.   Musculoskeletal:         General: No swelling, tenderness or deformity.      Cervical back: Neck supple. No rigidity. No muscular tenderness.   Lymphadenopathy:      Cervical: No cervical adenopathy.   Skin:     General: Skin is warm and dry.      Findings: No erythema or rash.   Neurological:      General: No focal deficit present.      Mental Status: He is alert and oriented to person, place, and time. Mental status is at baseline.      Cranial Nerves: No cranial nerve deficit.      Sensory: No sensory deficit.      Motor: No weakness.   Psychiatric:         Mood and Affect: Mood normal.         Behavior: Behavior normal.         Thought Content: Thought content normal.         Laboratory:  Recent Labs     11/07/21  1805   WBC 12.9*   RBC 5.93   HEMOGLOBIN 16.5   HEMATOCRIT 49.9   MCV 84.1   MCH 27.8   MCHC 33.1*   RDW 38.3   PLATELETCT 265   MPV 8.9*     Recent Labs     11/07/21  1805   SODIUM 135   POTASSIUM 4.1   CHLORIDE 88*   CO2 17*   GLUCOSE 391*   BUN 26*   CREATININE 1.39   CALCIUM 9.3     Recent Labs     11/07/21  1805   ALTSGPT 30   ASTSGOT 18   ALKPHOSPHAT 101*   TBILIRUBIN 0.6   LIPASE 11   GLUCOSE 391*         No results for input(s): NTPROBNP in the last 72 hours.      No results for input(s): TROPONINT in the last 72 hours.    Imaging:  CT-ABDOMEN-PELVIS WITH   Final Result      No acute intra-abdominal abnormality to explain abdominal pain.    Horseshoe kidney with bilateral, nonobstructing renal stones.              Assessment/Plan:  I anticipate this patient will require at least two midnights for appropriate medical management, necessitating inpatient admission.    * DKA (diabetic ketoacidosis) ([facility])  Assessment & Plan  Pending A1c  Patient with poor medical insight, unable to recall insulin regimen has poor outpatient follow-up  Current episode likely secondary to noncompliance  No evidence of infectious cause at this time  Continue insulin therapy and aggressive IV volume resuscitation  Monitor electrolytes replace as needed  Pending results of urinalysis and ketones  Pending TSH    CKD (chronic kidney disease) stage 3, GFR 30-59 ml/min ([facility])  Assessment & Plan  Likely secondary to poor glycemic control longstanding type 2 diabetes mellitus  Unknown baseline  Gentle IV volume resuscitation  Continue to renally dose all medications and avoid nephrotoxic agents    Noncompliance with medications  Assessment & Plan  Continue to encourage proper insulin intake  Pending diabetic education    DM hyperosmolarity type II ([facility])- (present on admission)  Assessment & Plan  Patient with poorly controlled type 2 diabetes mellitus  Patient unable to tell me insulin regimen  Pending diabetic education    Metabolic acidosis  Assessment & Plan  Secondary to DKA and volume depletion  Continue insulin therapy and IV volume resuscitation    Leukocytosis  Assessment & Plan  Likely reactive  No infectious etiology identified on CT imaging of abdomen  Patient without symptomatology suggestive of other source of infection      VTE prophylaxis: heparin ppx

## 2021-11-08 NOTE — PROGRESS NOTES
Telemetry shift summary:  Rhythm: SR, ST     HR Range: 90s to 100      Measurements from strip printed at 02:31:15   [place]: 0.08   QRS 0.10   QT 0.36     Ectopies: Rare PVC; occasional trigeminy.

## 2021-11-08 NOTE — ASSESSMENT & PLAN NOTE
Likely reactive  No infectious etiology identified on CT imaging of abdomen  Patient without symptomatology suggestive of other source of infection

## 2021-11-08 NOTE — PROGRESS NOTES
Med rec updated and complete  Allergies reviewed  Pt reports that he only takes NOVOLIN and can't tell me what pharmacy he gets his medications.  Pt is not sure the insulin regiment or when he took this medication last, and keeps telling me that he wants to leave.       No current facility-administered medications on file prior to encounter.     Current Outpatient Medications on File Prior to Encounter   Medication Sig Dispense Refill   • Non Formulary Request Novolin

## 2021-11-08 NOTE — CODE DOCUMENTATION
Patient hooked up to [facility] to prepare for transport to [facility].

## 2021-11-08 NOTE — CONSULTS
Reason for Consult:  Asked by Dr Tod Caldera to see this patient with CP abnormal EKG  Patient's PCP: Pcp Pt States None    CC: N/V here for DKA  HPI: This is a 56-year-old gentleman without a history of heart disease who presents with diabetes dyslipidemia to outside hospital with DKA and nausea and vomiting his symptoms were stabilizing but this afternoon developed chest pressure and EKG was done showed possibility for inferior MI his chest pressure described as a pressure sensation in the epigastrium with associated nausea he had vomited after given aspirin at the outside hospital.  Given his risk factors and concern for clinical picture of acute coronary syndrome he was transferred here emergently for consideration of coronary angiogram here his chest pains have resolved with no specific treatment his EKG is stabilized no clear ST elevations he is feeling better he had some shortness of breath earlier with the chest pressure that is resolved no prior history of this no prior cardiac work-up.  Medications / Drug list prior to admission:  No current facility-administered medications on file prior to encounter.     Current Outpatient Medications on File Prior to Encounter   Medication Sig Dispense Refill   • Non Formulary Request Novolin         Current list of administered Medications:    Current Facility-Administered Medications:   •  NS infusion, , Intravenous, Continuous, Anthony Pozo M.D.  •  aspirin (ASA) tablet 325 mg, 325 mg, Oral, DAILY, Ayo Crandall M.D.  •  heparin infusion 25,000 units in 500 mL 0.45% NACL, 0-30 Units/kg/hr, Intravenous, Continuous, Ayo Crandall M.D.  •  heparin injection 4,000 Units, 4,000 Units, Intravenous, Once, Ayo Crandall M.D.  •  heparin injection 2,000 Units, 2,000 Units, Intravenous, PRN, Ayo Crandall M.D.    Current Outpatient Medications:   •  Non Formulary Request, Novolin, Disp: , Rfl:     Past Medical History:   Diagnosis Date   •  Diabetes (HCC)    • Hypercholesteremia        Past Surgical History:   Procedure Laterality Date   • OTHER      appy, lumbar fusion       Family History   Problem Relation Age of Onset   • Heart Disease Father      Patient family history was personally reviewed, no pertinent family history to current presentation    Social History     Tobacco Use   • Smoking status: Former Smoker   • Smokeless tobacco: Never Used   Vaping Use   • Vaping Use: Not on file   Substance Use Topics   • Alcohol use: Not Currently   • Drug use: Not Currently       ALLERGIES:  No Known Allergies    Review of systems:  A complete review of symptoms was reviewed with patient . This is reviewed in H&P and PMH. ALL OTHERS reviewed and negative    Physical exam:  /72   Pulse 97   Temp 36.6 °C (97.9 °F)   Resp 15   Ht 1.829 m (6')   Wt 86.2 kg (190 lb)   SpO2 97%     General: No acute distress.   EYES: no jaundice  HEENT: OP clear   Neck:  No JVD.   CVS:  RRR.   Resp: No wheezing or crackles/rhonchi.  Abdomen: Soft, ND,  Skin: Grossly nothing acute no obvious rashes  Neurological: Alert, Moves all extremities, no cranial nerve defects on limited exam  Extremities:   no edema. No cyanosis.       Data:  Laboratory studies personally reviewed by me:  Recent Results (from the past 24 hour(s))   CBC WITH DIFFERENTIAL    Collection Time: 11/07/21  6:05 PM   Result Value Ref Range    WBC 12.9 (H) 4.8 - 10.8 K/uL    RBC 5.93 4.70 - 6.10 M/uL    Hemoglobin 16.5 14.0 - 18.0 g/dL    Hematocrit 49.9 42.0 - 52.0 %    MCV 84.1 81.4 - 97.8 fL    MCH 27.8 27.0 - 33.0 pg    MCHC 33.1 (L) 33.7 - 35.3 g/dL    RDW 38.3 35.9 - 50.0 fL    Platelet Count 265 164 - 446 K/uL    MPV 8.9 (L) 9.0 - 12.9 fL    Neutrophils-Polys 85.90 (H) 44.00 - 72.00 %    Lymphocytes 9.00 (L) 22.00 - 41.00 %    Monocytes 4.20 0.00 - 13.40 %    Eosinophils 0.20 0.00 - 6.90 %    Basophils 0.20 0.00 - 1.80 %    Immature Granulocytes 0.50 0.00 - 0.90 %    Nucleated RBC 0.00 /100 WBC     Neutrophils (Absolute) 11.10 (H) 1.82 - 7.42 K/uL    Lymphs (Absolute) 1.17 1.00 - 4.80 K/uL    Monos (Absolute) 0.54 0.00 - 0.85 K/uL    Eos (Absolute) 0.02 0.00 - 0.51 K/uL    Baso (Absolute) 0.03 0.00 - 0.12 K/uL    Immature Granulocytes (abs) 0.07 0.00 - 0.11 K/uL    NRBC (Absolute) 0.00 K/uL   COMP METABOLIC PANEL    Collection Time: 11/07/21  6:05 PM   Result Value Ref Range    Sodium 135 135 - 145 mmol/L    Potassium 4.1 3.6 - 5.5 mmol/L    Chloride 88 (L) 96 - 112 mmol/L    Co2 17 (L) 20 - 33 mmol/L    Anion Gap 30.0 (H) 7.0 - 16.0    Glucose 391 (H) 65 - 99 mg/dL    Bun 26 (H) 8 - 22 mg/dL    Creatinine 1.39 0.50 - 1.40 mg/dL    Calcium 9.3 8.4 - 10.2 mg/dL    AST(SGOT) 18 12 - 45 U/L    ALT(SGPT) 30 2 - 50 U/L    Alkaline Phosphatase 101 (H) 30 - 99 U/L    Total Bilirubin 0.6 0.1 - 1.5 mg/dL    Albumin 3.8 3.2 - 4.9 g/dL    Total Protein 6.6 6.0 - 8.2 g/dL    Globulin 2.8 1.9 - 3.5 g/dL    A-G Ratio 1.4 g/dL   LIPASE    Collection Time: 11/07/21  6:05 PM   Result Value Ref Range    Lipase 11 7 - 58 U/L   ESTIMATED GFR    Collection Time: 11/07/21  6:05 PM   Result Value Ref Range    GFR If African American >60 >60 mL/min/1.73 m 2    GFR If Non  53 (A) >60 mL/min/1.73 m 2   MAGNESIUM    Collection Time: 11/07/21  6:05 PM   Result Value Ref Range    Magnesium 2.1 1.5 - 2.5 mg/dL   PHOSPHORUS    Collection Time: 11/07/21  6:05 PM   Result Value Ref Range    Phosphorus 5.3 (H) 2.5 - 4.5 mg/dL   BETA-HYDROXYBUTYRIC ACID    Collection Time: 11/07/21  6:05 PM   Result Value Ref Range    beta-Hydroxybutyric Acid >8.00 (H) 0.02 - 0.27 mmol/L   HEMOGLOBIN A1C    Collection Time: 11/07/21  6:05 PM   Result Value Ref Range    Glycohemoglobin 16.2 (H) 4.0 - 5.6 %    Est Avg Glucose 418 mg/dL   TSH    Collection Time: 11/07/21  6:05 PM   Result Value Ref Range    TSH 1.680 0.380 - 5.330 uIU/mL   POCT glucose device results    Collection Time: 11/07/21  8:15 PM   Result Value Ref Range    Glucose -  Accu-Ck 283 (H) 65 - 99 mg/dL   URINALYSIS    Collection Time: 21  9:09 PM    Specimen: Urine, Clean Catch   Result Value Ref Range    Color Yellow     Character Clear     Specific Gravity 1.020 <1.035    Ph 5.0 5.0 - 8.0    Glucose >=1000 (A) Negative mg/dL    Ketones >=80 (A) Negative mg/dL    Protein Negative Negative mg/dL    Bilirubin Small (A) Negative    Nitrite Negative Negative    Leukocyte Esterase Negative Negative    Occult Blood Trace (A) Negative    Micro Urine Req Microscopic    URINE MICROSCOPIC (W/UA)    Collection Time: 21  9:09 PM   Result Value Ref Range    RBC 0-2 (A) /hpf    Bacteria Rare (A) None /hpf    Epithelial Cells Negative Few /hpf    Mucous Threads Rare /hpf   EKG (NOW)    Collection Time: 21  9:09 PM   Result Value Ref Range    Report       [facility] Emergency Dept.    Test Date:  2021  Pt Name:    TARA JACOBSON               Department: [facility]  MRN:        [id number]                      [facility]  Gender:     Male                         Technician: JED  :        1965                   Requested By:ALESHA SANTOS  Order #:    [id number]                    Reading MD:    Measurements  Intervals                                Axis  Rate:       95                           P:          75  AL:         176                          QRS:        81  QRSD:       92                           T:          55  QT:         384  QTc:        483    Interpretive Statements  SINUS RHYTHM  BORDERLINE PROLONGED QT INTERVAL  No previous ECG available for comparison     CBC without Differential    Collection Time: 21 12:00 AM   Result Value Ref Range    WBC 13.3 (H) 4.8 - 10.8 K/uL    RBC 5.50 4.70 - 6.10 M/uL    Hemoglobin 15.5 14.0 - 18.0 g/dL    Hematocrit 47.3 42.0 - 52.0 %    MCV 86.0 81.4 - 97.8 fL    MCH 28.2 27.0 - 33.0 pg    MCHC 32.8 (L) 33.7 - 35.3 g/dL    RDW 38.8 35.9 - 50.0 fL    Platelet Count 235 164 - 446 K/uL     MPV 9.0 9.0 - 12.9 fL   Basic Metabolic Panel (BMP)    Collection Time: 21 12:00 AM   Result Value Ref Range    Sodium 139 135 - 145 mmol/L    Potassium 4.5 3.6 - 5.5 mmol/L    Chloride 95 (L) 96 - 112 mmol/L    Co2 17 (L) 20 - 33 mmol/L    Glucose 348 (H) 65 - 99 mg/dL    Bun 27 (H) 8 - 22 mg/dL    Creatinine 0.99 0.50 - 1.40 mg/dL    Calcium 8.8 8.4 - 10.2 mg/dL    Anion Gap 27.0 (H) 7.0 - 16.0   ESTIMATED GFR    Collection Time: 21 12:00 AM   Result Value Ref Range    GFR If African American >60 >60 mL/min/1.73 m 2    GFR If Non African American >60 >60 mL/min/1.73 m 2   POCT glucose device results    Collection Time: 21 12:56 AM   Result Value Ref Range    Glucose - Accu-Ck 325 (H) 65 - 99 mg/dL   POCT glucose device results    Collection Time: 21  5:46 AM   Result Value Ref Range    Glucose - Accu-Ck 295 (H) 65 - 99 mg/dL   Basic Metabolic Panel    Collection Time: 21 10:50 AM   Result Value Ref Range    Sodium 143 135 - 145 mmol/L    Potassium 4.4 3.6 - 5.5 mmol/L    Chloride 101 96 - 112 mmol/L    Co2 27 20 - 33 mmol/L    Glucose 240 (H) 65 - 99 mg/dL    Bun 33 (H) 8 - 22 mg/dL    Creatinine 1.07 0.50 - 1.40 mg/dL    Calcium 8.8 8.4 - 10.2 mg/dL    Anion Gap 15.0 7.0 - 16.0   ESTIMATED GFR    Collection Time: 21 10:50 AM   Result Value Ref Range    GFR If African American >60 >60 mL/min/1.73 m 2    GFR If Non African American >60 >60 mL/min/1.73 m 2   POCT glucose device results    Collection Time: 21 11:39 AM   Result Value Ref Range    Glucose - Accu-Ck 223 (H) 65 - 99 mg/dL   EKG    Collection Time: 21  1:02 PM   Result Value Ref Range    Report       [facility]    Test Date:  2021  Pt Name:    TARA JACOBSON               Department: MED  MRN:        [id number]                      Room:       3311  Gender:     Male                         Technician: [id number]  :        1965                   Requested By:MARLENE BONILLA  Order #:    [id number]                     Reading MD:    Measurements  Intervals                                Axis  Rate:       99                           P:          44  SC:         157                          QRS:        82  QRSD:       100                          T:          79  QT:         385  QTc:        495    Interpretive Statements  Sinus rhythm  ST elevation, consider inferior injury  Borderline prolonged QT interval  Compared to ECG 2021 21:09:33  ST (T wave) deviation now present  Myocardial infarct finding now present     EKG    Collection Time: 21  1:10 PM   Result Value Ref Range    Report       [facility]    Test Date:  2021  Pt Name:    BAILEE JACOBSON               Department: MED  MRN:        [id number]                      Room:       [facility]  Gender:     Male                         Technician: [id number]  :        1965                   Requested By:MARLENE BONILLA  Order #:    [id number]                    Reading MD:    Measurements  Intervals                                Axis  Rate:       100                          P:          72  SC:         146                          QRS:        78  QRSD:       94                           T:  QT:         364  QTc:        470    Interpretive Statements  Sinus tachycardia  Multiple ventricular premature complexes  Borderline low voltage, extremity leads  Baseline wander in lead(s) V2  Compared to ECG 2021 13:02:07  Ventricular premature complex(es) now present  Sinus rhythm no longer present  ST (T wave) deviation no longer present  Myocardial infarct finding no longer present     POCT glucose device results    Collection Time: 21  2:04 PM   Result Value Ref Range    Glucose - Accu-Ck 238 (H) 65 - 99 mg/dL   Basic Metabolic Panel    Collection Time: 21  2:05 PM   Result Value Ref Range    Sodium 137 135 - 145 mmol/L    Potassium 4.1 3.6 - 5.5 mmol/L    Chloride 98 96 - 112 mmol/L    Co2 20 20 - 33 mmol/L    Glucose 230 (H) 65 - 99 mg/dL     Bun 32 (H) 8 - 22 mg/dL    Creatinine 1.16 0.50 - 1.40 mg/dL    Calcium 8.6 8.4 - 10.2 mg/dL    Anion Gap 19.0 (H) 7.0 - 16.0   Troponin    Collection Time: 21  2:05 PM   Result Value Ref Range    Troponin T 26 (H) 6 - 19 ng/L   PROCALCITONIN    Collection Time: 21  2:05 PM   Result Value Ref Range    Procalcitonin 0.15 <0.25 ng/mL   aPTT    Collection Time: 21  2:05 PM   Result Value Ref Range    APTT 28.6 24.7 - 36.0 sec   Prothrombin Time    Collection Time: 21  2:05 PM   Result Value Ref Range    PT 12.7 12.0 - 14.6 sec    INR 1.03 0.87 - 1.13   Heparin Xa (Unfractionated)    Collection Time: 21  2:05 PM   Result Value Ref Range    Heparin Xa (UFH) <0.10 IU/mL   ESTIMATED GFR    Collection Time: 21  2:05 PM   Result Value Ref Range    GFR If African American >60 >60 mL/min/1.73 m 2    GFR If Non African American >60 >60 mL/min/1.73 m 2   EKG (NOW)    Collection Time: 21  3:20 PM   Result Value Ref Range    Report       [facility] Emergency Dept.    Test Date:  2021  Pt Name:    BAILEE JACOBSON               Department: ER  MRN:        [id number]                      Room:  Gender:     Male                         Technician: [id number]  :        1965                   Requested By:ER TRIAGE PROTOCOL  Order #:    [id number]                    Reading MD:    Measurements  Intervals                                Axis  Rate:       98                           P:          53  CT:         188                          QRS:        78  QRSD:       90                           T:          6  QT:         376  QTc:        481    Interpretive Statements  SINUS RHYTHM  VENTRICULAR PREMATURE COMPLEX  BORDERLINE T WAVE ABNORMALITIES  BORDERLINE PROLONGED QT INTERVAL  ARTIFACT IN LEAD(S) II,III,aVL,aVF,V1 AND BASELINE WANDER IN LEAD(S) V2  Compared to ECG 2021 13:10:20  T-wave abnormality now present  Sinus tachycardia no longer present          Imaging:  DX-CHEST-PORTABLE (1 VIEW)    (Results Pending)           EKG tracings personally reviewed by me  SR with subtle ST change in inferior leads aVF 1308 now NSR no ST changes    Echocardiogram images personally reviewed by me show normal ejection fraction no wall motion changes    All pertinent features of laboratory and imaging reviewed including primary images where applicable      Principal Problem:    Diabetic ketoacidosis without coma associated with type 2 diabetes mellitus (HCC) POA: Yes  Active Problems:    Abnormal electrocardiogram (ECG) (EKG) (Chronic) POA: Yes  Resolved Problems:    * No resolved hospital problems. *      Assessment / Plan:  His EKG was concerning for possible acute coronary syndrome fortunately those features have resolved and his troponins are indeterminate.  He will be monitored with serial troponin testing he was given aspirin again by me as he reports vomiting the first dose and given his risk factors will start on heparin drip    Certainly for any recurrent chest pain check EKG if he had any worrisome EKG changes would advocate for angiogram we will keep him n.p.o. in case he rules in for myocardial infarction.    If he were to rule out would just continue medical therapy    I personally discussed his case with  Dr Anthony Pozo M.D. and Dr. Tod Caldera and Dr. Kevin Mayorga      It is my pleasure to participate in the care of Mr. Taylor.  Please do not hesitate to contact me with questions or concerns.    Ayo Crandall MD PhD [facility]  Cardiologist [facility]    11/8/2021    Please note that this dictation was created using voice recognition software. There may be errors I did not discover before finalizing the note.

## 2021-11-08 NOTE — PROGRESS NOTES
Rapid Response Note    Called to bedside at 1:59 PM due to concern of severe substernal chest pain.    I assessed the patient at bedside and he was in excruciating pain substernal sharp rating a 8/10 with no radiation.  He was hypotensive with blood pressure of 80/49.  He became very nauseated and started vomiting.  He had crackles in the left lower lung base.  Blood pressures started to increase with IV fluid bolus 1 L NS.  Unfortunately, due to soft blood pressures, we could not give him any pain medications.  Nitroglycerin was not given since it is contraindicated in inferior MI.  Blood pressure started improving up to 150/79.  He continued to have persistent 8/10 substernal chest pain.    I personally reviewed the EKGs.  Per my read there was 2 mm of ST elevation in aVF.  Repeat EKG showed improvement of this ST elevation.  Heart rate of 99, QTc 495.    Assessment and plan:  #Chest pain concerning for inferior MI  #Hypotension  #Diabetic ketoacidosis-resolved      Patient received full dose aspirin.  Heparin drip was ordered patient was transferred prior to his initiation.  High intensity atorvastatin was ordered.    I called the transfer center and I was connected with Dr. Pozo in the ER, who accepted the patient for transfer to the ER.    Discussed case with the cardiologist on-call, Dr. Crandall, who agreed to transfer the patient as a code STEMI since [facility] is currently on divert would not be able to transfer the patient for 1 to 2 days if only considered ACS per protocol.        Patient is critically ill.   The patient continues to have: Severe chest pain concerning for acute inferior MI.  The vital organ system that is affected is the: Cardiovascular and neurologic  If untreated there is a high chance of deterioration into: Acute heart failure, shock, cardiovascular collapse, cardiac arrest, and eventually death.   The critical care that I am providing today is: Extensive data  review and close and frequent monitoring of patient's vital signs at bedside with clinical assessment.  Initiated IV fluid bolus with improvement of his hypotension.  Time spent coordinating care with the [facility] transfer center, bedside nurse, charge nurse, rapid response team, and cardiologist on-call.  The critical that has been undertaken is medically complex.   There has been no overlap in critical care time.   Critical Care Time not including procedures: 40 minutes

## 2021-11-08 NOTE — ED TRIAGE NOTES
Chief Complaint   Patient presents with   • Chest Pain     Transfer from [facility] for STEMI transfer for ST elevation in VF. No STEMI noted on arrival

## 2021-11-08 NOTE — ASSESSMENT & PLAN NOTE
Patient with poorly controlled type 2 diabetes mellitus  Patient unable to tell me insulin regimen  Pending diabetic education

## 2021-11-08 NOTE — ASSESSMENT & PLAN NOTE
Likely secondary to poor glycemic control longstanding type 2 diabetes mellitus  Unknown baseline  Gentle IV volume resuscitation  Continue to renally dose all medications and avoid nephrotoxic agents

## 2021-11-08 NOTE — PROGRESS NOTES
At 22:30 Admitted Pt from ER  via gurney. Pt awake, A&O x 4. Transferred to bed and assessment performed. Oriented Pt to room and discussed POC. Initiated safety measures.

## 2021-11-08 NOTE — DISCHARGE SUMMARY
Discharge Summary    CHIEF COMPLAINT ON ADMISSION  Chief Complaint   Patient presents with   • Abdominal Pain     BIB [facility] report of nausea, vomiting, diarrhea and left upper quadrant pain for the past 3 days.  No blood noted in the emesis or diarrhea.  PTA  GCS 15 18 g RAC, Fent 50 mcg IV, Phenergan 12.5 mg IM    • N/V   • Diarrhea       Reason for Admission  EMS     CODE STATUS  Full Code    HPI & HOSPITAL COURSE  Kevin Taylor is a 56 y.o. male who presented 11/7/2021 with past medical history of CKD and poorly controlled type 2 diabetes mellitus, who presented to the ER with chief complaint of nausea and vomiting.  The patient was well until 4 days ago prior to admission when he developed diarrhea.  He reported taking his insulin and reported adverse reactions with Metformin and hydrochlorothiazide, which caused him to be dizzy.  He also complained of epigastric pain.  In the ER, he was found to have a increased anion gap secondary-27 to diabetic ketoacidosis.  CT scan of the abdomen did not show any acute process.  Patient was noted to be severely dehydrated requiring aggressive fluid resuscitation.    Following admission and administration of subcutaneous short acting and long-acting insulin as well as further administration IV fluids, patient's anion gap closed.  However, at approximately 2 PM, he had sudden excruciating substernal chest pain with ischemic changes on EKG concerning for acute inferior myocardial infarction.  He was noted to be hypotensive with blood pressure down to 80/40 despite continued to receive aggressive fluid resuscitation.  He subsequently developed nausea and vomiting.  He was given additional IV fluid bolus with good blood pressure response.  Patient was started on aspirin and emergently transferred to [facility] emergently as a code STEMI following discussion with the ER physician, Dr. Pozo, and on-call cardiologist, Dr. Crandall.  Although the patient  did not technically meet criteria for a STEMI, due to the current bed situation with [facility] being on bed divert, patient was upgraded to a CODE STEMI.      Therefore, he is discharged in guarded condition to [facility].         FOLLOW UP ITEMS POST DISCHARGE  -With cardiology per discharging team.    DISCHARGE DIAGNOSES  Principal Problem:    Acute MI, inferior wall ([facility]) POA: Yes  Active Problems:    Leukocytosis POA: Yes    Noncompliance with medications POA: Yes    CKD (chronic kidney disease) stage 3, GFR 30-59 ml/min ([facility]) POA: Yes  Resolved Problems:    DKA (diabetic ketoacidosis) ([facility]) POA: Yes    Metabolic acidosis POA: Yes    DM hyperosmolarity type II ([facility]) POA: Yes      FOLLOW UP  Per [facility] discharging team.    MEDICATIONS ON DISCHARGE     Medication List      Ask your doctor about these medications      Instructions   Non Formulary Request   Novolin            Allergies  No Known Allergies    DIET  Orders Placed This Encounter   Procedures   • Diet Order Diet: Consistent CHO (Diabetic)     Standing Status:   Standing     Number of Occurrences:   1     Order Specific Question:   Diet:     Answer:   Consistent CHO (Diabetic) [4]       ACTIVITY  As per discharging team.    LINES, DRAINS, AND WOUNDS  This is an automated list. Peripheral IVs will be removed prior to discharge.  Peripheral IV 11/07/21 18 G Right Antecubital (Active)   Site Assessment Clean;Dry;Intact 11/08/21 1300   Dressing Type Occlusive;Transparent 11/08/21 1300   Line Status Infusing 11/08/21 1300   Dressing Status Clean;Dry;Intact 11/08/21 1300   Dressing Intervention N/A 11/08/21 1300   Infiltration Grading ([facility]) 0 11/08/21 1300   Phlebitis Scale ([facility] Only) 0 11/08/21 1300       Peripheral IV 11/08/21 20 G Right Wrist (Active)          Peripheral IV 11/07/21 18 G Right Antecubital (Active)   Site Assessment Clean;Dry;Intact 11/08/21 1300   Dressing Type  Occlusive;Transparent 11/08/21 1300   Line Status Infusing 11/08/21 1300   Dressing Status Clean;Dry;Intact 11/08/21 1300   Dressing Intervention N/A 11/08/21 1300   Infiltration Grading ([facility]) 0 11/08/21 1300   [facility] 0 11/08/21 1300       Peripheral IV 11/08/21 20 G Right Wrist (Active)               MENTAL STATUS ON TRANSFER  Alert and oriented x3.          CONSULTATIONS  Cardiology    PROCEDURES  None    LABORATORY  Lab Results   Component Value Date    SODIUM 137 11/08/2021    POTASSIUM 4.1 11/08/2021    CHLORIDE 98 11/08/2021    CO2 20 11/08/2021    GLUCOSE 230 (H) 11/08/2021    BUN 32 (H) 11/08/2021    CREATININE 1.16 11/08/2021        Lab Results   Component Value Date    WBC 13.3 (H) 11/08/2021    HEMOGLOBIN 15.5 11/08/2021    HEMATOCRIT 47.3 11/08/2021    PLATELETCT 235 11/08/2021        Total time of the discharge process exceeds 34 minutes.

## 2021-11-08 NOTE — ED NOTES
Unable to complete medication reconciliation interview at this time.     Galilea Lovell, PharmD, BCPS

## 2021-11-08 NOTE — ED PROVIDER NOTES
ED Provider Note    CHIEF COMPLAINT  Chief Complaint   Patient presents with   • Abdominal Pain     BIB [facility] report of nausea, vomiting, diarrhea and left upper quadrant pain for the past 3 days.  No blood noted in the emesis or diarrhea.  PTA  GCS 15 18 g RAC, Fent 50 mcg IV, Phenergan 12.5 mg IM    • N/V   • Diarrhea        HPI  Kevin Taylor is a 56 y.o. male who presents to the ED with complaints of nausea vomiting diarrhea and abdominal pain.  The patient states he started having some pain about 3 days ago been gradually worsening over the past 3 days to he describes as a severe pain left upper quadrant.  Patient's been unable to keep any fluids down has been vomiting and having diarrhea.  Patient was given 50 mcg of fentanyl prior to arrival as well as Phenergan 12.5 mg.  Patient is still feeling very nauseated describes above symptoms.  Denies any chest pain does describe chills denies any overt fevers denies any other symptoms.  She states he has had his appendix out in the past.  Denies any other abdominal surgeries.    REVIEW OF SYSTEMS  See HPI for further details. All other systems are negative.     PAST MEDICAL HISTORY  Past Medical History:   Diagnosis Date   • Diabetes (HCC)    • Hypercholesteremia        FAMILY HISTORY  History reviewed. No pertinent family history.    SOCIAL HISTORY  Social History     Socioeconomic History   • Marital status: Not on file     Spouse name: Not on file   • Number of children: Not on file   • Years of education: Not on file   • Highest education level: Not on file   Occupational History   • Not on file   Tobacco Use   • Smoking status: Never Smoker   • Smokeless tobacco: Never Used   Vaping Use   • Vaping Use: Not on file   Substance and Sexual Activity   • Alcohol use: Not Currently   • Drug use: Not Currently   • Sexual activity: Not on file   Other Topics Concern   • Not on file   Social History Narrative   • Not on file     Social Determinants of Health      Financial Resource Strain:    • Difficulty of Paying Living Expenses: Not on file   Food Insecurity:    • Worried About Running Out of Food in the Last Year: Not on file   • Ran Out of Food in the Last Year: Not on file   Transportation Needs:    • Lack of Transportation (Medical): Not on file   • Lack of Transportation (Non-Medical): Not on file   Physical Activity:    • Days of Exercise per Week: Not on file   • Minutes of Exercise per Session: Not on file   Stress:    • Feeling of Stress : Not on file   Social Connections:    • Frequency of Communication with Friends and Family: Not on file   • Frequency of Social Gatherings with Friends and Family: Not on file   • Attends [employer] Services: Not on file   • Active Member of Clubs or Organizations: Not on file   • Attends Club or Organization Meetings: Not on file   • Marital Status: Not on file   Intimate Partner Violence:    • Fear of Current or Ex-Partner: Not on file   • Emotionally Abused: Not on file   • Physically Abused: Not on file   • Sexually Abused: Not on file   Housing Stability:    • Unable to Pay for Housing in the Last Year: Not on file   • Number of Places Lived in the Last Year: Not on file   • Unstable Housing in the Last Year: Not on file      No primary care provider on file.    SURGICAL HISTORY  History reviewed. No pertinent surgical history.    CURRENT MEDICATIONS  Home Medications     Reviewed by Samantha Henriquez R.N. (Registered Nurse) on 11/07/21 at 8584  Med List Status: Unable to Obtain   Medication Last Dose Status        Patient Tuan Taking any Medications                       ALLERGIES  No Known Allergies    PHYSICAL EXAM  VITAL SIGNS: /74   Pulse (!) 106   Temp 36.3 °C (97.3 °F) (Temporal)   Resp (!) 25   Ht 1.829 m (6')   Wt 86.2 kg (190 lb)   SpO2 100%   BMI 25.77 kg/m²    Pulse Oximetry was obtained. It showed a reading of Pulse Oximetry: 96 %.  I interpreted this as nonhypoxic.     Constitutional: Well  developed, Well nourished, No acute distress, Non-toxic appearance.   HENT: Normocephalic, Atraumatic, Bilateral external ears normal, bilateral tympanic membranes normal, Oropharynx dry mucous membranes, No oral exudates, Nose normal.   Eyes:  conjunctiva is normal, there are no signs of exudate.   Neck: Supple, no cervical lymphadenopathy, no meningeal signs..   Lymphatic: No lymphadenopathy noted.   Cardiovascular: Regular rate and rhythm without murmurs gallops or rubs.   Thorax & Lungs: Lungs are clear to auscultation bilaterally, there are no wheezes no rales. Chest wall is nontender.  Abdomen: Soft, under in the left upper quadrant region there is no rebound tenderness bowel sounds are present but hypoactive.   Skin: Warm, Dry, No erythema,   Back: No tenderness, No CVA tenderness.  Musculoskeletal: Good range of motion in all major joints. No tenderness to palpation or major deformities noted. Intact distal pulses, no clubbing, no cyanosis, no edema     Neurologic: Alert & oriented x 3, Normal motor function, Normal sensory function, No focal deficits noted.   Psychiatric: Affect normal, Judgment normal, Mood normal.     EKG  Interpreted below by myself    RADIOLOGY/PROCEDURES  CT-ABDOMEN-PELVIS WITH   Final Result      No acute intra-abdominal abnormality to explain abdominal pain.   Horseshoe kidney with bilateral, nonobstructing renal stones.          Results for orders placed or performed during the hospital encounter of 11/07/21   CBC WITH DIFFERENTIAL   Result Value Ref Range    WBC 12.9 (H) 4.8 - 10.8 K/uL    RBC 5.93 4.70 - 6.10 M/uL    Hemoglobin 16.5 14.0 - 18.0 g/dL    Hematocrit 49.9 42.0 - 52.0 %    MCV 84.1 81.4 - 97.8 fL    MCH 27.8 27.0 - 33.0 pg    MCHC 33.1 (L) 33.7 - 35.3 g/dL    RDW 38.3 35.9 - 50.0 fL    Platelet Count 265 164 - 446 K/uL    MPV 8.9 (L) 9.0 - 12.9 fL    Neutrophils-Polys 85.90 (H) 44.00 - 72.00 %    Lymphocytes 9.00 (L) 22.00 - 41.00 %    Monocytes 4.20 0.00 - 13.40 %     Eosinophils 0.20 0.00 - 6.90 %    Basophils 0.20 0.00 - 1.80 %    Immature Granulocytes 0.50 0.00 - 0.90 %    Nucleated RBC 0.00 /100 WBC    Neutrophils (Absolute) 11.10 (H) 1.82 - 7.42 K/uL    Lymphs (Absolute) 1.17 1.00 - 4.80 K/uL    Monos (Absolute) 0.54 0.00 - 0.85 K/uL    Eos (Absolute) 0.02 0.00 - 0.51 K/uL    Baso (Absolute) 0.03 0.00 - 0.12 K/uL    Immature Granulocytes (abs) 0.07 0.00 - 0.11 K/uL    NRBC (Absolute) 0.00 K/uL   COMP METABOLIC PANEL   Result Value Ref Range    Sodium 135 135 - 145 mmol/L    Potassium 4.1 3.6 - 5.5 mmol/L    Chloride 88 (L) 96 - 112 mmol/L    Co2 17 (L) 20 - 33 mmol/L    Anion Gap 30.0 (H) 7.0 - 16.0    Glucose 391 (H) 65 - 99 mg/dL    Bun 26 (H) 8 - 22 mg/dL    Creatinine 1.39 0.50 - 1.40 mg/dL    Calcium 9.3 8.4 - 10.2 mg/dL    AST(SGOT) 18 12 - 45 U/L    ALT(SGPT) 30 2 - 50 U/L    Alkaline Phosphatase 101 (H) 30 - 99 U/L    Total Bilirubin 0.6 0.1 - 1.5 mg/dL    Albumin 3.8 3.2 - 4.9 g/dL    Total Protein 6.6 6.0 - 8.2 g/dL    Globulin 2.8 1.9 - 3.5 g/dL    A-G Ratio 1.4 g/dL   LIPASE   Result Value Ref Range    Lipase 11 7 - 58 U/L   ESTIMATED GFR   Result Value Ref Range    GFR If African American >60 >60 mL/min/1.73 m 2    GFR If Non  53 (A) >60 mL/min/1.73 m 2   MAGNESIUM   Result Value Ref Range    Magnesium 2.1 1.5 - 2.5 mg/dL   PHOSPHORUS   Result Value Ref Range    Phosphorus 5.3 (H) 2.5 - 4.5 mg/dL     HYDRATION: Based on the patient's presentation of dehydration,  the patient was given IV fluids. IV Hydration was used because oral hydration is unable to be done due to the patient's symptoms. Upon recheck following hydration, the patient was improving but still feeling very nauseated.    COURSE & MEDICAL DECISION MAKING  Pertinent Labs & Imaging studies reviewed. (See chart for details)  She presents the ED for evaluation.  Clinically the patient does have tenderness in the left upper quadrant region.  CT scan of the abdomen shows a horseshoe  kidney with intrarenal stones but no signs of hydronephrosis no signs of nephrolithiasis.  There is also no signs of diverticulitis.  CBC and a BMP was obtained CBC slightly elevated white blood cell count at 12 does have an anion gap of 30 bicarb 17.  I did give the patient 2 L bolus of lactated Ringer's as well as 6 units of insulin.  Repeat fingerstick blood sugar now is down to 283.  The patient is still complaining of abdominal discomfort.  CT scan was reviewed there is again no signs of significant intra-abdominal pathology.  At this point because of his continued nausea he is feeling slightly improved and wants to try some ice chips but I do feel the patient should be admitted the hospital for further fluid hydration and treatment of his DKA.  I will contact the hospitalist for this admission.    CRITICAL CARE  The very real possibilty of a deterioration of this patient's condition required the highest level of my preparedness for sudden, emergent intervention.  I provided critical care services, which included medication orders, frequent reevaluations of the patient's condition and response to treatment, ordering and reviewing test results, and discussing the case with various consultants.  The critical care time associated with the care of the patient was 40 minutes. Review chart for interventions. This time is exclusive of any other billable procedures.       FINAL IMPRESSION  1. Diabetic ketoacidosis without coma associated with type 1 diabetes mellitus (HCC)     2. Nausea vomiting and diarrhea     3. Left upper quadrant abdominal pain                 Electronically signed by: Randy Arredondo M.D., 11/7/2021 6:21 PM

## 2021-11-08 NOTE — ED TRIAGE NOTES
56 yr old male to room   Chief Complaint   Patient presents with   • Abdominal Pain     BIB [facility] report of nausea, vomiting, diarrhea and left upper quadrant pain for the past 3 days.  No blood noted in the emesis or diarrhea.  PTA  GCS 15 18 g RAC, Fent 50 mcg IV, Phenergan 12.5 mg IM    • N/V   • Diarrhea     /75   Pulse 98   Temp 36.3 °C (97.3 °F) (Temporal)   Resp 17   Ht 1.829 m (6')   Wt 86.2 kg (190 lb)   SpO2 96%   BMI 25.77 kg/m²     Has this patient been vaccinated for COVID Yes  If not, would they like to be vaccinated while in the ER if eligible?  N/A  Would the patient like to speak with the ERP about the possibility of receiving the COVID vaccine today before making a decision? N/A

## 2021-11-08 NOTE — ED PROVIDER NOTES
ED Provider Note    Scribed for Anthony Pozo M.D. by Bismark Silverman. 11/8/2021  3:31 PM    Primary care provider: Pcp Pt States None  Means of arrival: Ambulance  History obtained from: Patient   History limited by: None    CHIEF COMPLAINT  Chief Complaint   Patient presents with    Chest Pain     Transfer from [facility] for STEMI transfer for ST elevation in [facility]. No STEMI noted on arrival     HPI  Christoph Taylor is a 56 y.o. male with history of uncontrolled diabetes, who presents to the Emergency Department by ambulance as a transfer from [facility] for evaluation of a possible STEMI. The patient was admitted for DKA (first time), nausea and vomiting at [facility] when he developed chest pain yesterday afternoon. He states chest pain is sternal and is sharp in quality. He was medicated with 324 mg aspirin. He rates pain at 2/10 at this time. Blood pressure 116/72 and heart rate 98 en route. He denies diaphoresis or shortness of breath. He denies history of hypertension or MI. He does not smoke tobacco. He does have family history of MI in his father.     REVIEW OF SYSTEMS  Pertinent positives include chest pain, nausea, vomiting.   Pertinent negatives include no diaphoresis or shortness of breath.    All other systems reviewed and negative.    PAST MEDICAL HISTORY   has a past medical history of Diabetes (HCC) and Hypercholesteremia.    SURGICAL HISTORY   has a past surgical history that includes other.    SOCIAL HISTORY  Social History     Tobacco Use    Smoking status: Never Smoker    Smokeless tobacco: Never Used   Vaping Use   Substance Use Topics    Alcohol use: Not Currently    Drug use: Not Currently      Social History     Substance and Sexual Activity   Drug Use Not Currently       FAMILY HISTORY  Family History   Problem Relation Age of Onset    Heart Disease Father      CURRENT MEDICATIONS  Home Medications       Reviewed by Lamar Navas (Pharmacy Tech) on 11/08/21 at  1622  Med List Status: Complete     Medication Last Dose Status   Non Formulary Request unknown Active                    ALLERGIES  No Known Allergies    PHYSICAL EXAM  VITAL SIGNS: /69   Pulse 93   Temp 36.6 °C (97.9 °F)   Resp 12   Ht 1.829 m (6')   Wt 86.2 kg (190 lb)   SpO2 97%   BMI 25.77 kg/m²     Constitutional: Well developed, Well nourished, mild distress, Non-toxic appearance.   HENT: Normocephalic, Atraumatic, Bilateral external ears normal, Oropharynx moist, No oral exudates.   Eyes: PERRLA, EOMI, Conjunctiva normal, No discharge.   Neck: No tenderness, Supple, No stridor.   Lymphatic: No lymphadenopathy noted.   Cardiovascular: Normal heart rate, Normal rhythm.   Thorax & Lungs: Clear to auscultation bilaterally, No respiratory distress, No wheezing, No crackles.   Abdomen: Soft, No tenderness, No masses, No pulsatile masses.   Skin: Slightly diaphoretic. Warm, Dry, No erythema, No rash.   Extremities:, No edema No cyanosis.   Musculoskeletal: No tenderness to palpation or major deformities noted.  Intact distal pulses  Neurologic: Awake, alert. Moves all extremities spontaneously.  Psychiatric: Affect normal, Judgment normal, Mood normal.     LABS  Results for orders placed or performed during the hospital encounter of 11/08/21   EC-ECHOCARDIOGRAM COMPLETE W/O CONT   Result Value Ref Range    Eject.Frac. MOD BP 67.09     Eject.Frac. MOD 4C 68.44     Eject.Frac. MOD 2C 69.02     Left Ventrical Ejection Fraction 65    TROPONIN   Result Value Ref Range    Troponin T 26 (H) 6 - 19 ng/L   proBrain Natriuretic Peptide, NT   Result Value Ref Range    NT-proBNP 31 0 - 125 pg/mL   CBC WITH DIFFERENTIAL   Result Value Ref Range    WBC 13.3 (H) 4.8 - 10.8 K/uL    RBC 4.77 4.70 - 6.10 M/uL    Hemoglobin 13.6 (L) 14.0 - 18.0 g/dL    Hematocrit 39.7 (L) 42.0 - 52.0 %    MCV 83.2 81.4 - 97.8 fL    MCH 28.5 27.0 - 33.0 pg    MCHC 34.3 33.7 - 35.3 g/dL    RDW 38.6 35.9 - 50.0 fL    Platelet Count 231 164  - 446 K/uL    MPV 9.1 9.0 - 12.9 fL    Neutrophils-Polys 83.50 (H) 44.00 - 72.00 %    Lymphocytes 8.50 (L) 22.00 - 41.00 %    Monocytes 7.40 0.00 - 13.40 %    Eosinophils 0.00 0.00 - 6.90 %    Basophils 0.10 0.00 - 1.80 %    Immature Granulocytes 0.50 0.00 - 0.90 %    Nucleated RBC 0.00 /100 WBC    Neutrophils (Absolute) 11.12 (H) 1.82 - 7.42 K/uL    Lymphs (Absolute) 1.13 1.00 - 4.80 K/uL    Monos (Absolute) 0.99 (H) 0.00 - 0.85 K/uL    Eos (Absolute) 0.00 0.00 - 0.51 K/uL    Baso (Absolute) 0.01 0.00 - 0.12 K/uL    Immature Granulocytes (abs) 0.07 0.00 - 0.11 K/uL    NRBC (Absolute) 0.00 K/uL   COMP METABOLIC PANEL   Result Value Ref Range    Sodium 138 135 - 145 mmol/L    Potassium 3.8 3.6 - 5.5 mmol/L    Chloride 99 96 - 112 mmol/L    Co2 26 20 - 33 mmol/L    Anion Gap 13.0 7.0 - 16.0    Glucose 219 (H) 65 - 99 mg/dL    Bun 29 (H) 8 - 22 mg/dL    Creatinine 0.96 0.50 - 1.40 mg/dL    Calcium 8.4 (L) 8.5 - 10.5 mg/dL    AST(SGOT) 12 12 - 45 U/L    ALT(SGPT) 20 2 - 50 U/L    Alkaline Phosphatase 73 30 - 99 U/L    Total Bilirubin 0.4 0.1 - 1.5 mg/dL    Albumin 3.1 (L) 3.2 - 4.9 g/dL    Total Protein 5.8 (L) 6.0 - 8.2 g/dL    Globulin 2.7 1.9 - 3.5 g/dL    A-G Ratio 1.1 g/dL   LIPASE   Result Value Ref Range    Lipase 10 (L) 11 - 82 U/L   PROTHROMBIN TIME   Result Value Ref Range    PT 12.6 12.0 - 14.6 sec    INR 0.97 0.87 - 1.13   APTT   Result Value Ref Range    APTT 28.1 24.7 - 36.0 sec   Heparin Xa (Unfractionated)   Result Value Ref Range    Heparin Xa (UFH) <0.10 IU/mL   ESTIMATED GFR   Result Value Ref Range    GFR If African American >60 >60 mL/min/1.73 m 2    GFR If Non African American >60 >60 mL/min/1.73 m 2   EKG (NOW)   Result Value Ref Range    Report       [facility] Emergency Dept.    Test Date:  2021-11-08  Pt Name:    BAILEE KAVON               Department: ER  MRN:        [id number]                      Room:        18  Gender:     Male                         Technician:  [id number]  :        1965                   Requested By:ER TRIAGE PROTOCOL  Order #:    [id number]                    Reading MD: JORGE GARCIA MD    Measurements  Intervals                                Axis  Rate:       98                           P:          53  CA:         188                          QRS:        78  QRSD:       90                           T:          6  QT:         376  QTc:        481    Interpretive Statements  SINUS RHYTHM  VENTRICULAR PREMATURE COMPLEX  BORDERLINE T WAVE ABNORMALITIES  BORDERLINE PROLONGED QT INTERVAL  ARTIFACT IN LEAD(S) II,III,aVL,aVF,V1 AND BASELINE WANDER IN LEAD(S) V2  Compared to ECG 2021 13:10:20  T-wave abnormality now present  Sinus tachycardia no longer present  Electronicall y Signed On 2021 16:09:04 PST by JORGE GARCIA MD        All labs reviewed by me.     12 Lead EKG interpreted by me as above.      RADIOLOGY  EC-ECHOCARDIOGRAM COMPLETE W/O CONT   Final Result        The radiologist's interpretation of all radiological studies have been reviewed by me.      COURSE & MEDICAL DECISION MAKING  Pertinent Labs & Imaging studies reviewed. (See chart for details)    3:31 PM - Patient seen and examined in the trauma bay. Patient was evaluated by Dr. Crandall (Cardiology) in the trauma bay, and Code STEMI was deactivated. Patient will be treated with 1 L NS IV. Ordered EKG, Troponin, BNP, CBC with diff, CMP, Lipase, Prothrombin Time, APTT to evaluate his symptoms. The differential diagnoses include but are not limited to: STEMI, Nonspecific Chest Pain, NSTEMI.     4:13 PM - Paged CDU Hospitalist.       HYDRATION: Based on the patient's presentation of Acute Vomiting and Other NPO  the patient was given IV fluids. IV Hydration was used because oral hydration was not adequate alone. Upon recheck following hydration, the patient was slightly improved.    Decision Making:  Chest pain uncertain outside hospital, EKG here does not show a STEMI,  cardiology was involved, put the patient on a heparin drip, discussed the case with the hospitalist for hospitalization.    DISPOSITION:  Patient will be hospitalized by the hospitalist in guarded condition.     FINAL IMPRESSION  1. Chest pain, unspecified type        I, Bismark Silverman (Scribe), am scribing for, and in the presence of, Anthony Pozo M.D..    Electronically signed by: Bismark Silverman (Tamiaibe), 11/8/2021    IAnthony M.D. personally performed the services described in this documentation, as scribed by Bismark Silverman in my presence, and it is both accurate and complete.    The note accurately reflects work and decisions made by me.  Anthony Pozo M.D.  11/8/2021  5:52 PM

## 2021-11-08 NOTE — ED NOTES
Patient reports pain 7/10 and requesting pain medication. MD notified. Verbal order for morphine 4 mg.

## 2021-11-08 NOTE — PROGRESS NOTES
Report given to PENNIE Dubois at [facility].   Patient off unit via rney, all belonging with patient.

## 2021-11-09 ENCOUNTER — PHARMACY VISIT (OUTPATIENT)
Dept: PHARMACY | Facility: MEDICAL CENTER | Age: 56
End: 2021-11-09
Payer: COMMERCIAL

## 2021-11-09 ENCOUNTER — PATIENT OUTREACH (OUTPATIENT)
Dept: HEALTH INFORMATION MANAGEMENT | Facility: OTHER | Age: 56
End: 2021-11-09

## 2021-11-09 ENCOUNTER — APPOINTMENT (OUTPATIENT)
Dept: RADIOLOGY | Facility: MEDICAL CENTER | Age: 56
DRG: 638 | End: 2021-11-09
Attending: STUDENT IN AN ORGANIZED HEALTH CARE EDUCATION/TRAINING PROGRAM
Payer: COMMERCIAL

## 2021-11-09 VITALS
HEART RATE: 88 BPM | RESPIRATION RATE: 16 BRPM | SYSTOLIC BLOOD PRESSURE: 111 MMHG | WEIGHT: 180.5 LBS | OXYGEN SATURATION: 97 % | HEIGHT: 72 IN | DIASTOLIC BLOOD PRESSURE: 63 MMHG | TEMPERATURE: 98.5 F | BODY MASS INDEX: 24.45 KG/M2

## 2021-11-09 LAB
ALBUMIN SERPL BCP-MCNC: 3.2 G/DL (ref 3.2–4.9)
BASOPHILS # BLD AUTO: 0.2 % (ref 0–1.8)
BASOPHILS # BLD: 0.02 K/UL (ref 0–0.12)
BUN SERPL-MCNC: 22 MG/DL (ref 8–22)
CALCIUM SERPL-MCNC: 8 MG/DL (ref 8.5–10.5)
CHLORIDE SERPL-SCNC: 101 MMOL/L (ref 96–112)
CHOLEST SERPL-MCNC: 144 MG/DL (ref 100–199)
CO2 SERPL-SCNC: 25 MMOL/L (ref 20–33)
CREAT SERPL-MCNC: 0.82 MG/DL (ref 0.5–1.4)
EKG IMPRESSION: NORMAL
EOSINOPHIL # BLD AUTO: 0.01 K/UL (ref 0–0.51)
EOSINOPHIL NFR BLD: 0.1 % (ref 0–6.9)
ERYTHROCYTE [DISTWIDTH] IN BLOOD BY AUTOMATED COUNT: 39.9 FL (ref 35.9–50)
GLUCOSE BLD-MCNC: 178 MG/DL (ref 65–99)
GLUCOSE BLD-MCNC: 222 MG/DL (ref 65–99)
GLUCOSE SERPL-MCNC: 204 MG/DL (ref 65–99)
HCT VFR BLD AUTO: 39 % (ref 42–52)
HDLC SERPL-MCNC: 64 MG/DL
HGB BLD-MCNC: 12.7 G/DL (ref 14–18)
IMM GRANULOCYTES # BLD AUTO: 0.03 K/UL (ref 0–0.11)
IMM GRANULOCYTES NFR BLD AUTO: 0.3 % (ref 0–0.9)
LDLC SERPL CALC-MCNC: 65 MG/DL
LYMPHOCYTES # BLD AUTO: 1.36 K/UL (ref 1–4.8)
LYMPHOCYTES NFR BLD: 13.5 % (ref 22–41)
MAGNESIUM SERPL-MCNC: 1.9 MG/DL (ref 1.5–2.5)
MCH RBC QN AUTO: 27.4 PG (ref 27–33)
MCHC RBC AUTO-ENTMCNC: 32.6 G/DL (ref 33.7–35.3)
MCV RBC AUTO: 84.2 FL (ref 81.4–97.8)
MONOCYTES # BLD AUTO: 0.81 K/UL (ref 0–0.85)
MONOCYTES NFR BLD AUTO: 8 % (ref 0–13.4)
NEUTROPHILS # BLD AUTO: 7.84 K/UL (ref 1.82–7.42)
NEUTROPHILS NFR BLD: 77.9 % (ref 44–72)
NRBC # BLD AUTO: 0 K/UL
NRBC BLD-RTO: 0 /100 WBC
PHOSPHATE SERPL-MCNC: 2.8 MG/DL (ref 2.5–4.5)
PLATELET # BLD AUTO: 201 K/UL (ref 164–446)
PMV BLD AUTO: 9.1 FL (ref 9–12.9)
POTASSIUM SERPL-SCNC: 3.7 MMOL/L (ref 3.6–5.5)
RBC # BLD AUTO: 4.63 M/UL (ref 4.7–6.1)
SODIUM SERPL-SCNC: 138 MMOL/L (ref 135–145)
TRIGL SERPL-MCNC: 75 MG/DL (ref 0–149)
TROPONIN T SERPL-MCNC: 19 NG/L (ref 6–19)
UFH PPP CHRO-ACNC: 0.34 IU/ML
WBC # BLD AUTO: 10.1 K/UL (ref 4.8–10.8)

## 2021-11-09 PROCEDURE — 83735 ASSAY OF MAGNESIUM: CPT

## 2021-11-09 PROCEDURE — 700102 HCHG RX REV CODE 250 W/ 637 OVERRIDE(OP): Performed by: STUDENT IN AN ORGANIZED HEALTH CARE EDUCATION/TRAINING PROGRAM

## 2021-11-09 PROCEDURE — 80069 RENAL FUNCTION PANEL: CPT

## 2021-11-09 PROCEDURE — 80061 LIPID PANEL: CPT

## 2021-11-09 PROCEDURE — RXMED WILLOW AMBULATORY MEDICATION CHARGE: Performed by: STUDENT IN AN ORGANIZED HEALTH CARE EDUCATION/TRAINING PROGRAM

## 2021-11-09 PROCEDURE — 85520 HEPARIN ASSAY: CPT

## 2021-11-09 PROCEDURE — 85025 COMPLETE CBC W/AUTO DIFF WBC: CPT

## 2021-11-09 PROCEDURE — 99239 HOSP IP/OBS DSCHRG MGMT >30: CPT | Performed by: STUDENT IN AN ORGANIZED HEALTH CARE EDUCATION/TRAINING PROGRAM

## 2021-11-09 PROCEDURE — 82962 GLUCOSE BLOOD TEST: CPT

## 2021-11-09 PROCEDURE — 84484 ASSAY OF TROPONIN QUANT: CPT

## 2021-11-09 PROCEDURE — 99231 SBSQ HOSP IP/OBS SF/LOW 25: CPT | Performed by: INTERNAL MEDICINE

## 2021-11-09 PROCEDURE — 93005 ELECTROCARDIOGRAM TRACING: CPT | Performed by: STUDENT IN AN ORGANIZED HEALTH CARE EDUCATION/TRAINING PROGRAM

## 2021-11-09 PROCEDURE — 700111 HCHG RX REV CODE 636 W/ 250 OVERRIDE (IP): Performed by: STUDENT IN AN ORGANIZED HEALTH CARE EDUCATION/TRAINING PROGRAM

## 2021-11-09 PROCEDURE — 93010 ELECTROCARDIOGRAM REPORT: CPT | Performed by: INTERNAL MEDICINE

## 2021-11-09 PROCEDURE — A9270 NON-COVERED ITEM OR SERVICE: HCPCS | Performed by: STUDENT IN AN ORGANIZED HEALTH CARE EDUCATION/TRAINING PROGRAM

## 2021-11-09 PROCEDURE — 74018 RADEX ABDOMEN 1 VIEW: CPT

## 2021-11-09 RX ORDER — OMEPRAZOLE 20 MG/1
20 CAPSULE, DELAYED RELEASE ORAL DAILY
Qty: 30 CAPSULE | Refills: 0 | Status: SHIPPED | OUTPATIENT
Start: 2021-11-09 | End: 2022-03-18

## 2021-11-09 RX ORDER — POLYETHYLENE GLYCOL 3350 17 G/17G
1 POWDER, FOR SOLUTION ORAL DAILY
Status: DISCONTINUED | OUTPATIENT
Start: 2021-11-09 | End: 2021-11-09 | Stop reason: HOSPADM

## 2021-11-09 RX ORDER — ASPIRIN 81 MG/1
81 TABLET, CHEWABLE ORAL DAILY
Qty: 100 TABLET | Refills: 0 | Status: SHIPPED | OUTPATIENT
Start: 2021-11-09 | End: 2022-07-28

## 2021-11-09 RX ORDER — AMOXICILLIN 250 MG
1 CAPSULE ORAL DAILY
Status: DISCONTINUED | OUTPATIENT
Start: 2021-11-09 | End: 2021-11-09 | Stop reason: HOSPADM

## 2021-11-09 RX ORDER — OMEPRAZOLE 20 MG/1
40 CAPSULE, DELAYED RELEASE ORAL ONCE
Status: COMPLETED | OUTPATIENT
Start: 2021-11-09 | End: 2021-11-09

## 2021-11-09 RX ADMIN — LIDOCAINE HYDROCHLORIDE 30 ML: 20 SOLUTION OROPHARYNGEAL at 10:41

## 2021-11-09 RX ADMIN — ACETAMINOPHEN 650 MG: 325 TABLET ORAL at 10:41

## 2021-11-09 RX ADMIN — OMEPRAZOLE 40 MG: 20 CAPSULE, DELAYED RELEASE ORAL at 12:19

## 2021-11-09 RX ADMIN — POLYETHYLENE GLYCOL 3350 1 PACKET: 17 POWDER, FOR SOLUTION ORAL at 11:07

## 2021-11-09 RX ADMIN — MORPHINE SULFATE 4 MG: 4 INJECTION INTRAVENOUS at 06:18

## 2021-11-09 RX ADMIN — SENNOSIDES AND DOCUSATE SODIUM 1 TABLET: 50; 8.6 TABLET ORAL at 11:07

## 2021-11-09 RX ADMIN — INSULIN HUMAN 4 UNITS: 100 INJECTION, SOLUTION PARENTERAL at 12:25

## 2021-11-09 ASSESSMENT — ENCOUNTER SYMPTOMS
ABDOMINAL DISTENTION: 0
WEAKNESS: 0
CHEST TIGHTNESS: 0
BLOOD IN STOOL: 0
FEVER: 0
NAUSEA: 1
PALPITATIONS: 0
ABDOMINAL PAIN: 0
SHORTNESS OF BREATH: 0
FATIGUE: 0
DIZZINESS: 0
LIGHT-HEADEDNESS: 0

## 2021-11-09 ASSESSMENT — PAIN DESCRIPTION - PAIN TYPE
TYPE: ACUTE PAIN

## 2021-11-09 NOTE — CARE PLAN
"The patient is Watcher - Medium risk of patient condition declining or worsening    Shift Goals  Clinical Goals: have angiogram  Patient Goals: feel better, \"get out\"  Family Goals: n/a      Problem: Knowledge Deficit - Standard  Goal: Patient and family/care givers will demonstrate understanding of plan of care, disease process/condition, diagnostic tests and medications  Outcome: Not Progressing     Problem: Pain - Standard  Goal: Alleviation of pain or a reduction in pain to the patient’s comfort goal  Outcome: Not Progressing     "

## 2021-11-09 NOTE — CARE PLAN
The patient is Stable - Low risk of patient condition declining or worsening    Shift Goals  Clinical Goals: diabetic educator   Patient Goals: discharge  Family Goals: N/A    Progress made toward(s) clinical / shift goals:    Problem: Knowledge Deficit - Standard  Goal: Patient and family/care givers will demonstrate understanding of plan of care, disease process/condition, diagnostic tests and medications  Outcome: Progressing     Problem: Pain - Standard  Goal: Alleviation of pain or a reduction in pain to the patient’s comfort goal  Outcome: Progressing       Patient is not progressing towards the following goals:

## 2021-11-09 NOTE — PROGRESS NOTES
Telemetry shift summary:  Rhythm: SR, ST     HR Range: 87 to 101     Measurements from strip printed at 02:31:15   NC: 0.12  QRS 0.05   QT 0.34     Ectopies: Frequent PVC.

## 2021-11-09 NOTE — DISCHARGE PLANNING
Attempted to speak with patient at bedside but patient not wanting to answer questions wants to see doctor not feeling he is ready to D/C. Dr. Chavarria updated and going to see patient.

## 2021-11-09 NOTE — CONSULTS
Diabetes education: Pt has a hx of diabetes, was admitted to [facility] on 11/7, transferred to [facility] on 11/8. Pt had a blood sugar of 391 on 11/7 and 348 on 11/8 ( at 0000).   Pt was preparing to discharge. Per MD note pt has all his medications and supplies at home. Med rec not clear on what insulin he uses ( as per pharm tech note he was not clear with them) but only Novolin.  Pt was on regular insulin sliding scale coverage  and  with blood sugars of  243 ( 4 units), 178 ( NPO), and 222 ( 4 units).  Spoke with pt prior to him leaving. Pt was not happy about sharing the information as he said he had given it many times before. He is on Novolin bur NPH and he said he adjusts it according to what he needs. Briefly discussed his A1c and explained why the question was asked. He has [employer] listed as insurance but lives in [place], so he may get his insulin from [facility] ( not confirmed). Pt not interested in discussing insulin storage, shelf life or site rotation. Conversation ended.

## 2021-11-09 NOTE — ED NOTES
Yadi Monae, PhT   Pharmacy Tech      Progress Notes       Signed   Date of Service:  11/8/2021  7:50 AM                 Med rec updated and complete  Allergies reviewed  Pt reports that he only takes NOVOLIN and can't tell me what pharmacy he gets his medications.  Pt is not sure the insulin regiment or when he took this medication last, and keeps telling me that he wants to leave.                                  Med rec updated and complete. At [facility] per med rec tech prior to arrival.

## 2021-11-09 NOTE — DISCHARGE SUMMARY
Discharge Summary    CHIEF COMPLAINT ON ADMISSION  Chief Complaint   Patient presents with   • Chest Pain     Transfer from [facility] for STEMI transfer for ST elevation in VF. No STEMI noted on arrival       Reason for Admission  Chest pain r/o ACS    Admission Date  11/8/2021    CODE STATUS  Full Code    HPI & HOSPITAL COURSE  Per Dr. Caldera's DC summary from [facility] and Dr. Espinoza's HPI dated 11/8/2021:    Christoph Taylor is a 56 y.o. male with  uncontrolled diabetes on insulin and hyperlipidemia who presented 11/7/2021 to [facility] ER with epigastric pain, nausea, vomiting and diarrhea found to have DKA with anion gap 30. After aggressive IVF, subq short acting and long acting insulin, Pt's AG closed. However, on 11/8 around 1400, Pt reported substernal pain; found to have concerning ST changes on EKG 11/8/2021 then subsequently transferred to [facility] ER for cardiology evaluation.      Case was discussed with on-call cardiology - No STEMI seen here; recommended continuation of heparin drip for the time being on admission and full echo. Pt was then admitted to medicine service for further management.    On medicine unit overnight, vitals relatively stable, afebrile. No events on telemonitor. High sensitivity troponin trended down to normal range. Echo done was with normal LVEF, normal wall motion abnormalities. At this point, ACS is considered unlikely; epigastric pain likely related to GI source. GI cocktails and omeprazole provided for GI symptoms. KUB unremarkable.     Met with Pt at bedside and care plan discussed. Advised to continue his home insulin regimen and statin; to add baby aspirin to daily meds. Medication compliance and the need for a close follow up emphasized. Pt reported he has all his home medications and supplies. Hence, no Rx sent.      Therefore, he is discharged in fair and stable condition to home with close outpatient follow-up.    The patient recovered much more quickly than anticipated on  admission.    Discharge Date  11/09/21    FOLLOW UP ITEMS POST DISCHARGE  N/A    DISCHARGE DIAGNOSES  Principal Problem:    Chest pain in adult POA: Yes  Active Problems:    Diabetic ketoacidosis without coma associated with type 2 diabetes mellitus (HCC) POA: Yes    Leukocytosis POA: Yes    Noncompliance with medications POA: Yes    Abnormal electrocardiogram (ECG) (EKG) (Chronic) POA: Yes    KRISTAL (acute kidney injury) (HCC) POA: Yes  Resolved Problems:    NSTEMI (non-ST elevated myocardial infarction) ([facility]) POA: Unknown      FOLLOW UP[address] [address] [phone]  Call  Please call [facility] to establish with a Primary Care Provider. This office offers sliding fee scales based on income. Thank you.      MEDICATIONS ON DISCHARGE     Medication List      START taking these medications      Instructions   aspirin 81 MG Chew chewable tablet  Commonly known as: ASA   Chew 1 Tablet every day.  Dose: 81 mg     omeprazole 20 MG delayed-release capsule  Commonly known as: PRILOSEC   Take 1 Capsule by mouth every day.  Dose: 20 mg        STOP taking these medications    Non Formulary Request            Allergies  No Known Allergies    DIET  Orders Placed This Encounter   Procedures   • Diet Order Diet: Consistent CHO (Diabetic)     Standing Status:   Standing     Number of Occurrences:   1     Order Specific Question:   Diet:     Answer:   Consistent CHO (Diabetic) [4]       ACTIVITY  As tolerated.  Weight bearing as tolerated    CONSULTATIONS  Cardiology    PROCEDURES  N/A    LABORATORY  Lab Results   Component Value Date    SODIUM 138 11/09/2021    POTASSIUM 3.7 11/09/2021    CHLORIDE 101 11/09/2021    CO2 25 11/09/2021    GLUCOSE 204 (H) 11/09/2021    BUN 22 11/09/2021    CREATININE 0.82 11/09/2021        Lab Results   Component Value Date    WBC 10.1 11/09/2021    HEMOGLOBIN 12.7 (L) 11/09/2021    HEMATOCRIT 39.0 (L) 11/09/2021    PLATELETCT 201  11/09/2021        Total time of the discharge process exceeds 36 minutes.

## 2021-11-09 NOTE — ED NOTES
"Pt to room B18 via gurney. Report received from PENNIE Millan. Pt mostly drowsy, arrousable to voice. Denies chest pain and reports \"just being hungry\" and returns easily to sleeping. Heparin gtt infusing.     BP/spo2/ecg monitoring in place. NSR on monitor.   "

## 2021-11-09 NOTE — H&P
Hospital Medicine History & Physical Note    Date of Service  11/8/2021    Primary Care Physician  Pcp Pt States None    Consultants  Cardiology (Dr. Crandall)    Code Status  Full Code    Chief Complaint  Chief Complaint   Patient presents with   • Chest Pain     Transfer from [facility] for STEMI transfer for ST elevation in VF. No STEMI noted on arrival       History of Presenting Illness  Christoph Taylor is a 56 y.o. male with history of uncontrolled diabetes, hyperlipidemia who presented 11/7/2021 to [facility] ER with nausea vomiting found to have DKA with anion gap 30 found to have concerning ST changes on EKG 11/8/2021 then subsequently transferred to [facility] ER for cardiology evaluation.     Attempted to obtain history from patient but he is uncooperative.  However, he does note substernal chest discomfort 8 out of 10 earlier today but currently at 2 out of 10 at this time.  Denies shortness of breath.  He otherwise is uncooperative with further questioning.    Per chart review, patient rapid response earlier today, noted to have minimal ST elevation in aVF and minimal ST depressions in leads II and III --- does not really qualify for STEMI criteria.  However case was discussed with on-call cardiology and ultimately sent to [facility] for cardiology evaluation.  No STEMI seen here.  Patient has been evaluated by cardiology, recommended continuation of heparin drip for the time being and full echo.  Request admission to medicine service for further evaluation and treatment.  Patient was admitted by me for further evaluation and treatment.    I discussed the plan of care with patient, bedside RN and cardiology.    Review of Systems  Review of Systems   Unable to perform ROS: Other   Constitutional: Positive for malaise/fatigue. Negative for diaphoresis and fever.   Respiratory: Negative for cough and shortness of breath.    Cardiovascular: Positive for chest pain. Negative for palpitations and leg  swelling.   Gastrointestinal: Positive for nausea and vomiting.   All other systems reviewed and are negative.  Patient is uncooperative with questioning for ROS but noted complaints as above    Past Medical History   has a past medical history of Diabetes (HCC) and Hypercholesteremia.    Surgical History  Denies PSH     Family History  CAD in father    Social History   reports that he has quit smoking. He has never used smokeless tobacco. He reports previous alcohol use. He reports previous drug use.    Allergies  No Known Allergies    Medications  Prior to Admission Medications   Prescriptions Last Dose Informant Patient Reported? Taking?   Non Formulary Request unknown at unknown Patient Yes No   [facility]      Facility-Administered Medications: None       Physical Exam  Temp:  [36.3 °C (97.3 °F)-36.9 °C (98.4 °F)] 36.9 °C (98.4 °F)  Pulse:  [] 95  Resp:  [12-20] 16  BP: ()/(49-79) 104/57  SpO2:  [90 %-100 %] 97 %  Blood Pressure: 123/69   Temperature: 36.6 °C (97.9 °F)   Pulse: 93   Respiration: 12   Pulse Oximetry: 97 %       Physical Exam  Vitals and nursing note reviewed.   Constitutional:       General: He is not in acute distress.     Appearance: Normal appearance.   HENT:      Head: Normocephalic and atraumatic.      Nose: Nose normal.      Mouth/Throat:      Mouth: Mucous membranes are dry.      Pharynx: Oropharynx is clear.   Eyes:      General: No scleral icterus.     Extraocular Movements: Extraocular movements intact.   Cardiovascular:      Rate and Rhythm: Regular rhythm. Tachycardia present.      Pulses: Normal pulses.      Heart sounds: No friction rub.   Pulmonary:      Effort: Pulmonary effort is normal. No respiratory distress.      Breath sounds: No wheezing or rales.   Chest:      Chest wall: No tenderness.   Abdominal:      General: There is no distension.      Palpations: Abdomen is soft.      Tenderness: There is no abdominal tenderness. There is no guarding.    Musculoskeletal:         General: No swelling or tenderness. Normal range of motion.      Cervical back: Neck supple. No tenderness.   Skin:     General: Skin is warm and dry.      Capillary Refill: Capillary refill takes less than 2 seconds.   Neurological:      General: No focal deficit present.      Mental Status: He is alert and oriented to person, place, and time.      Motor: No weakness.      Comments: uncooperative   Psychiatric:      Comments: Abnormal judgement         Laboratory:  Recent Labs     11/07/21  1805 11/08/21  0000 11/08/21  1524   WBC 12.9* 13.3* 13.3*   RBC 5.93 5.50 4.77   HEMOGLOBIN 16.5 15.5 13.6*   HEMATOCRIT 49.9 47.3 39.7*   MCV 84.1 86.0 83.2   MCH 27.8 28.2 28.5   MCHC 33.1* 32.8* 34.3   RDW 38.3 38.8 38.6   PLATELETCT 265 235 231   MPV 8.9* 9.0 9.1     Recent Labs     11/08/21  1050 11/08/21  1405 11/08/21  1524   SODIUM 143 137 138   POTASSIUM 4.4 4.1 3.8   CHLORIDE 101 98 99   CO2 27 20 26   GLUCOSE 240* 230* 219*   BUN 33* 32* 29*   CREATININE 1.07 1.16 0.96   CALCIUM 8.8 8.6 8.4*     Recent Labs     11/07/21  1805 11/08/21  0000 11/08/21  1050 11/08/21  1405 11/08/21  1524   ALTSGPT 30  --   --   --  20   ASTSGOT 18  --   --   --  12   ALKPHOSPHAT 101*  --   --   --  73   TBILIRUBIN 0.6  --   --   --  0.4   LIPASE 11  --   --   --  10*   GLUCOSE 391*   < > 240* 230* 219*    < > = values in this interval not displayed.     Recent Labs     11/08/21  1405 11/08/21  1524   APTT 28.6 28.1   INR 1.03 0.97     Recent Labs     11/08/21  1524   NTPROBNP 31         Recent Labs     11/08/21  1405 11/08/21  1524   TROPONINT 26* 26*       Imaging:  EC-ECHOCARDIOGRAM COMPLETE W/O CONT   Final Result          Completed 11/8/2021 @13:02 -- [facility]  Reviewed by me -- minimal ST elevation in aVF with very mild ST depression in leads II and III -- this does not qualify for acute STEMI criteria      Completed 11/8/2021 @13:10 -- [facility]  Reviewed by me -- nonspecific ST changes but no  apparent ST elevation or depression seen EIGHT MINUTES after initial EKG, further reason to doubt initial STEMI EKG      Completed 11/8/2021 @15:20 -- main [facility]  Reviewed by me -- no STEMI          X-Ray:  I have personally reviewed the images and compared with prior images.  EKG:  I have personally reviewed the images and compared with prior images.    Assessment/Plan:  I anticipate this patient is appropriate for observation status at this time.    * Chest pain in adult  Assessment & Plan  Trop T 26>26  EKGs as above    TTE: 65%EF, no WMA  F/u UDS  ASA/statin  PRN ntiro SL, morphine    Better glycemic control  Cardiology f/u appreciated -- heparin gtt for now    Noncompliance with medications- (present on admission)  Assessment & Plan  As noted by HbA1c 16.2  Uncooperative with questioning and recommendation  outpt f/u    Diabetic ketoacidosis without coma associated with type 2 diabetes mellitus (HCC)- (present on admission)  Assessment & Plan  DKA with AG 30, glucose 391, b-hydroxybutyrate >8, bicarb 17 on 11/8  AG closed 11/8    HbA1c 16.2 -- probable medical noncompliance  glargine + ISS  Should be on statin -- start statin    KRISTAL (acute kidney injury) (HCC)  Assessment & Plan  Improved / resolved    Abnormal electrocardiogram (ECG) (EKG)- (present on admission)  Assessment & Plan  Initially thought to be STEMI  Not seen on repeat EKG  Serial EKGs    Leukocytosis- (present on admission)  Assessment & Plan  Likely reactive      VTE prophylaxis: heparin gtt

## 2021-11-09 NOTE — PROGRESS NOTES
"Pt refusing 2 RN skin check. Pt states is 'to cold and want to eat\". Pt educated on skin breakdown and need of skin check, pt continues to refuse skin assessment   "

## 2021-11-09 NOTE — PROGRESS NOTES
Pt c/o constipation and discomfort. Pt spoke to Dr. Chavarria and new orders received. Symptoms improved after GI cocktail

## 2021-11-09 NOTE — PROGRESS NOTES
"Cardiology Follow Up Progress Note    Date of Service  11/9/2021    Attending Physician  Tod Chavarria M.D.    Chief Complaint   Chief Complaint   Patient presents with   • Chest Pain     Transfer from [facility] for STEMI transfer for ST elevation in VF. No STEMI noted on arrival       HPI  Christoph Taylor is a 56 y.o. male admitted 11/8/2021 with per Dr. Crandall, \"without a history of heart disease who presents with diabetes dyslipidemia to outside hospital with DKA and nausea and vomiting his symptoms were stabilizing but this afternoon developed chest pressure and EKG was done showed possibility for inferior MI his chest pressure described as a pressure sensation in the epigastrium with associated nausea he had vomited after given aspirin at the outside hospital.  Given his risk factors and concern for clinical picture of acute coronary syndrome he was transferred here emergently for consideration of coronary angiogram here his chest pains have resolved with no specific treatment his EKG is stabilized no clear ST elevations he is feeling better he had some shortness of breath earlier with the chest pressure that is resolved no prior history of this no prior cardiac work-up.\"     Interim Events  11/9/2021: No overnight cardiac events. Tele monitoring personally interpreted by me shows SR. VSS; RA. Negative trops, normal echo, labs reviewed per below. +UDS; medical management.     Review of Systems  Review of Systems   Constitutional: Negative for fatigue and fever.   Respiratory: Negative for chest tightness and shortness of breath.    Cardiovascular: Negative for chest pain, palpitations and leg swelling.   Gastrointestinal: Positive for nausea. Negative for abdominal distention, abdominal pain and blood in stool.   Genitourinary: Negative for dysuria.   Neurological: Negative for dizziness, syncope, weakness and light-headedness.       Vital signs in last 24 hours  Temp:  [36.4 °C (97.6 °F)-37.2 °C (98.9 °F)] " 36.9 °C (98.5 °F)  Pulse:  [] 88  Resp:  [12-20] 16  BP: ()/(49-79) 111/63  SpO2:  [90 %-100 %] 97 %    Physical Exam  Physical Exam  Vitals and nursing note reviewed.   Constitutional:       General: He is not in acute distress.  Cardiovascular:      Rate and Rhythm: Normal rate and regular rhythm. Occasional extrasystoles are present.     Pulses: Normal pulses.      Heart sounds: Normal heart sounds.   Pulmonary:      Effort: Pulmonary effort is normal. No respiratory distress.      Breath sounds: Normal breath sounds.   Abdominal:      General: Bowel sounds are normal. There is no distension.      Palpations: Abdomen is soft.      Tenderness: There is abdominal tenderness.   Musculoskeletal:         General: No swelling.      Cervical back: Normal range of motion.      Right lower leg: No edema.      Left lower leg: No edema.   Skin:     General: Skin is warm and dry.   Neurological:      General: No focal deficit present.      Mental Status: He is alert and oriented to person, place, and time. Mental status is at baseline.         Lab Review  Lab Results   Component Value Date/Time    WBC 10.1 11/09/2021 03:59 AM    RBC 4.63 (L) 11/09/2021 03:59 AM    HEMOGLOBIN 12.7 (L) 11/09/2021 03:59 AM    HEMATOCRIT 39.0 (L) 11/09/2021 03:59 AM    MCV 84.2 11/09/2021 03:59 AM    MCH 27.4 11/09/2021 03:59 AM    MCHC 32.6 (L) 11/09/2021 03:59 AM    MPV 9.1 11/09/2021 03:59 AM      Lab Results   Component Value Date/Time    SODIUM 138 11/09/2021 03:59 AM    POTASSIUM 3.7 11/09/2021 03:59 AM    CHLORIDE 101 11/09/2021 03:59 AM    CO2 25 11/09/2021 03:59 AM    GLUCOSE 204 (H) 11/09/2021 03:59 AM    BUN 22 11/09/2021 03:59 AM    CREATININE 0.82 11/09/2021 03:59 AM      Lab Results   Component Value Date/Time    ASTSGOT 12 11/08/2021 03:24 PM    ALTSGPT 20 11/08/2021 03:24 PM     Lab Results   Component Value Date/Time    CHOLSTRLTOT 144 11/09/2021 03:59 AM    LDL 65 11/09/2021 03:59 AM    HDL 64 11/09/2021 03:59 AM     TRIGLYCERIDE 75 11/09/2021 03:59 AM    TROPONINT 19 11/09/2021 03:59 AM       Recent Labs     11/08/21  1524   NTPROBNP 31      Ref. Range 11/8/2021 14:05 11/8/2021 15:24 11/8/2021 22:38 11/9/2021 03:59   Troponin T Latest Ref Range: 6 - 19 ng/L 26 (H) 26 (H) 22 (H) 19      Ref. Range 11/9/2021 03:59   Cholesterol,Tot Latest Ref Range: 100 - 199 mg/dL 144   Triglycerides Latest Ref Range: 0 - 149 mg/dL 75   HDL Latest Ref Range: >=40 mg/dL 64   LDL Latest Ref Range: <100 mg/dL 65     Cardiac Imaging and Procedures Review  EKG:  My personal interpretation of the EKG dated 11/9/2021 is SR no ST changes    Echocardiogram (11/8/2021):  No prior study is available for comparison.   The left ventricular ejection fraction is visually estimated to be 65%.  Normal regional wall motion.  Normal right ventricular size and systolic function.    Cardiac Catheterization:  NA    Imaging  Chest X-Ray (11/8/2021):  LUNGS: The lungs are clear.  HEART and MEDIASTINUM: normal in size.  Pleura: There are no pleural effusion or pneumothoraces.  Osseous structures: No significant bony abnormality.     Stress Test: NA    Assessment/Plan  Abnormal ECG; atypical chest pain  -Increases after eating.  -Flat trops  -Normal Echo, lipids  -No ST changes noted on EKG this AM   -ASA, statin    UDS + amphetamines  -denies use    T2DM  -Per primary    Thank you for allowing me to participate in the care of this patient.  Cardiology will sign off on this patient    Please contact me with any questions.    YESY Mcmahan.   [facility]  [phone]

## 2021-11-09 NOTE — DISCHARGE PLANNING
STEMI Transfer    Pt states his son Nickolas Taylor is aware he is here, per Pt son was out at [facility] with him.   Nickolas Taylor [phone]

## 2021-11-09 NOTE — DISCHARGE INSTRUCTIONS
Discharge Instructions    Discharged to home by taxi with self. Discharged via wheelchair, hospital escort: Yes.  Special equipment needed: Not Applicable    Be sure to schedule a follow-up appointment with your primary care doctor or any specialists as instructed.     Discharge Plan:   Diet Plan: Discussed  Activity Level: Discussed  Confirmed Follow up Appointment: Patient to Call and Schedule Appointment  Confirmed Symptoms Management: Discussed  Medication Reconciliation Updated: Yes  Influenza Vaccine Indication: Patient Refuses    I understand that a diet low in cholesterol, fat, and sodium is recommended for good health. Unless I have been given specific instructions below for another diet, I accept this instruction as my diet prescription.   Other diet: heart healthy     Special Instructions: None    · Is patient discharged on Warfarin / Coumadin?   No     Depression / Suicide Risk    As you are discharged from this [facility] facility, it is important to learn how to keep safe from harming yourself.    Recognize the warning signs:  · Abrupt changes in personality, positive or negative- including increase in energy   · Giving away possessions  · Change in eating patterns- significant weight changes-  positive or negative  · Change in sleeping patterns- unable to sleep or sleeping all the time   · Unwillingness or inability to communicate  · Depression  · Unusual sadness, discouragement and loneliness  · Talk of wanting to die  · Neglect of personal appearance   · Rebelliousness- reckless behavior  · Withdrawal from people/activities they love  · Confusion- inability to concentrate     If you or a loved one observes any of these behaviors or has concerns about self-harm, here's what you can do:  · Talk about it- your feelings and reasons for harming yourself  · Remove any means that you might use to hurt yourself (examples: pills, rope, extension cords, firearm)  · Get professional help from the community  (Mental Health, Substance Abuse, psychological counseling)  · Do not be alone:Call your Safe Contact- someone whom you trust who will be there for you.  · Call your local CRISIS HOTLINE 156-8553 or 427-128-9994  · Call your local Children's Mobile Crisis Response Team [place] (183) 076-9858 or www.SproutBox  · Call the toll free National Suicide Prevention Hotlines   · National Suicide Prevention Lifeline 674-236-YLIH (9090)  · National Hope Line Network 800-SUICIDE (242-1849)

## 2021-11-09 NOTE — ASSESSMENT & PLAN NOTE
DKA with AG 30, glucose 391, b-hydroxybutyrate >8, bicarb 17 on 11/8  AG closed 11/8    HbA1c 16.2 -- probable medical noncompliance  glargine + ISS  Should be on statin -- start statin

## 2021-11-09 NOTE — PROGRESS NOTES
Assumed care of pt.  Pt yelling and cursing at staff. Pt asked to stopped yelling at staff. Pt stopped yelling at staff.  Call light in reach. Bed in lowest position. Care of plan discussed with pt with pt agreeing to care of plan. All questions answered. Assessment completed.

## 2021-11-09 NOTE — PROGRESS NOTES
Unable to complete accurate assessment, patient irritable and requesting to be left alone.  Monitors applied, VS stable, call light and belongings within reach. POC updated. Pt educated on room and call light. Heparin gtt and fluids infusing. Communication board updated. Needs met.

## 2021-11-09 NOTE — ASSESSMENT & PLAN NOTE
Trop T 26>26  EKGs as above    TTE: 65%EF, no WMA  F/u UDS  ASA/statin  PRN ntiro SL, morphine    Better glycemic control  Cardiology f/u appreciated -- heparin gtt for now

## 2021-11-10 NOTE — DISCHARGE PLANNING
Meds-to-Beds: Discharge prescription orders listed below delivered to patient's bedside. PENNIE Bond notified. Patient declined counseling. Written information regarding the dispensed prescriptions was provided to the patient including the phone number of the pharmacy to call for any questions. Patient elected to have co-payment billed to patient account.     Current Outpatient Medications   Medication Sig Dispense Refill   • omeprazole (PRILOSEC) 20 MG delayed-release capsule Take 1 Capsule by mouth every day. 30 Capsule 0   • aspirin (ASA) 81 MG Chew Tab chewable tablet Chew 1 Tablet every day. 100 Tablet 0      Shirley Stewart, PharmD

## 2021-12-29 ENCOUNTER — APPOINTMENT (OUTPATIENT)
Dept: RADIOLOGY | Facility: MEDICAL CENTER | Age: 56
End: 2021-12-29
Attending: EMERGENCY MEDICINE
Payer: COMMERCIAL

## 2021-12-29 ENCOUNTER — HOSPITAL ENCOUNTER (EMERGENCY)
Facility: MEDICAL CENTER | Age: 56
End: 2021-12-29
Attending: EMERGENCY MEDICINE
Payer: COMMERCIAL

## 2021-12-29 ENCOUNTER — HOSPITAL ENCOUNTER (EMERGENCY)
Facility: MEDICAL CENTER | Age: 56
End: 2021-12-29

## 2021-12-29 VITALS
HEIGHT: 72 IN | RESPIRATION RATE: 18 BRPM | WEIGHT: 190 LBS | TEMPERATURE: 98.4 F | DIASTOLIC BLOOD PRESSURE: 62 MMHG | OXYGEN SATURATION: 92 % | HEART RATE: 90 BPM | SYSTOLIC BLOOD PRESSURE: 118 MMHG | BODY MASS INDEX: 25.73 KG/M2

## 2021-12-29 DIAGNOSIS — S02.2XXA CLOSED FRACTURE OF NASAL BONE, INITIAL ENCOUNTER: ICD-10-CM

## 2021-12-29 PROCEDURE — 70450 CT HEAD/BRAIN W/O DYE: CPT

## 2021-12-29 PROCEDURE — A9270 NON-COVERED ITEM OR SERVICE: HCPCS | Performed by: EMERGENCY MEDICINE

## 2021-12-29 PROCEDURE — 303747 HCHG EXTRA SUTURE

## 2021-12-29 PROCEDURE — 304999 HCHG REPAIR-SIMPLE/INTERMED LEVEL 1

## 2021-12-29 PROCEDURE — 700102 HCHG RX REV CODE 250 W/ 637 OVERRIDE(OP): Performed by: EMERGENCY MEDICINE

## 2021-12-29 PROCEDURE — 70486 CT MAXILLOFACIAL W/O DYE: CPT

## 2021-12-29 PROCEDURE — 72125 CT NECK SPINE W/O DYE: CPT

## 2021-12-29 PROCEDURE — 700101 HCHG RX REV CODE 250: Performed by: EMERGENCY MEDICINE

## 2021-12-29 PROCEDURE — 304217 HCHG IRRIGATION SYSTEM

## 2021-12-29 PROCEDURE — 99284 EMERGENCY DEPT VISIT MOD MDM: CPT

## 2021-12-29 RX ORDER — OXYCODONE HYDROCHLORIDE 10 MG/1
10 TABLET ORAL ONCE
Status: DISCONTINUED | OUTPATIENT
Start: 2021-12-29 | End: 2021-12-30 | Stop reason: HOSPADM

## 2021-12-29 RX ORDER — AMOXICILLIN AND CLAVULANATE POTASSIUM 875; 125 MG/1; MG/1
1 TABLET, FILM COATED ORAL 2 TIMES DAILY
Qty: 20 TABLET | Refills: 0 | Status: SHIPPED | OUTPATIENT
Start: 2021-12-29 | End: 2022-01-08

## 2021-12-29 RX ORDER — OXYCODONE HYDROCHLORIDE AND ACETAMINOPHEN 5; 325 MG/1; MG/1
1 TABLET ORAL ONCE
Status: COMPLETED | OUTPATIENT
Start: 2021-12-29 | End: 2021-12-29

## 2021-12-29 RX ORDER — LIDOCAINE HYDROCHLORIDE AND EPINEPHRINE 10; 10 MG/ML; UG/ML
10 INJECTION, SOLUTION INFILTRATION; PERINEURAL ONCE
Status: COMPLETED | OUTPATIENT
Start: 2021-12-29 | End: 2021-12-29

## 2021-12-29 RX ORDER — OXYCODONE HYDROCHLORIDE 5 MG/1
5 TABLET ORAL EVERY 6 HOURS PRN
Qty: 20 TABLET | Refills: 0 | Status: SHIPPED | OUTPATIENT
Start: 2021-12-29 | End: 2022-01-05

## 2021-12-29 RX ORDER — AMOXICILLIN AND CLAVULANATE POTASSIUM 875; 125 MG/1; MG/1
1 TABLET, FILM COATED ORAL ONCE
Status: DISCONTINUED | OUTPATIENT
Start: 2021-12-30 | End: 2021-12-30 | Stop reason: HOSPADM

## 2021-12-29 RX ADMIN — LIDOCAINE HYDROCHLORIDE AND EPINEPHRINE 10 ML: 10; 10 INJECTION, SOLUTION INFILTRATION; PERINEURAL at 21:30

## 2021-12-29 RX ADMIN — OXYCODONE HYDROCHLORIDE AND ACETAMINOPHEN 1 TABLET: 5; 325 TABLET ORAL at 20:30

## 2021-12-29 ASSESSMENT — FIBROSIS 4 INDEX: FIB4 SCORE: 0.76

## 2021-12-30 NOTE — ED NOTES
Pt started to become verbally aggressive towards staff and left out of the ambulance bay. Pt brought back into the ER, IV removed and pt left AMA refusing to sign paperwork.

## 2021-12-30 NOTE — DISCHARGE INSTRUCTIONS
You were seen in the ER for facial injuries after a fall.  Unfortunately you do have multiple nasal bone fractures.  I have been waiting for our facial fracture/ear nose and throat doctor to call back with recommendations but you would like to leave and so I am giving her prescription for antibiotics and pain medication.  I have called it into your pharmacy.  I have given you the contact information for the ENT, please call his office tomorrow to make a follow-up appointment.  Return to the ER with new or worsening symptoms.  Good luck, I hope you feel better soon!

## 2021-12-30 NOTE — ED PROVIDER NOTES
"ED Provider Note    CHIEF COMPLAINT  Chief Complaint   Patient presents with   • Facial Injury     Pt was in a restroom when he \"slipped and hit a iron tube\". Denies LOC, no thinners       HPI  Christoph Taylor is a 56 y.o. male who presents with a chief complaint of facial injury.  Patient apparently several alcoholic beverages, got up to use the restroom, slipped, and hit his face onto a \"iron tube\".  He did not lose consciousness and is not anticoagulated.  He has pain in the nose and face. He denies any neck pain or extremity weakness/numbness.    REVIEW OF SYSTEMS  See HPI for further details. Ground level fall. Facial injury. Nose pain. All other systems are negative.     PAST MEDICAL HISTORY   has a past medical history of Diabetes (HCC) and Hypercholesteremia.    SOCIAL HISTORY  Social History     Tobacco Use   • Smoking status: Former Smoker   • Smokeless tobacco: Never Used   Vaping Use   • Vaping Use: Not on file   Substance and Sexual Activity   • Alcohol use: Not Currently   • Drug use: Not Currently   • Sexual activity: Not on file       SURGICAL HISTORY   has a past surgical history that includes other.    CURRENT MEDICATIONS  Home Medications    **Home medications have not yet been reviewed for this encounter**         ALLERGIES  No Known Allergies    PHYSICAL EXAM  VITAL SIGNS: /62   Pulse 90   Temp 36.9 °C (98.4 °F) (Temporal)   Resp 18   Ht 1.829 m (6')   Wt 86.2 kg (190 lb)   SpO2 92%   BMI 25.77 kg/m²    Pulse ox interpretation: I interpret this pulse ox as normal.  Constitutional: Alert in no apparent distress.  HENT: 2.5cm laceration over bridge of nose with active bleeding. Bleeding from bilateral nostrils. No septal hematoma. No dental injury. No malocclusion. Midface is stable. Bilateral external ears normal, no hemotympanum. Nasal deformity with associated tenderness. Moist mucous membranes.  Eyes: Pupils are equal and reactive, Conjunctiva normal, Non-icteric.   Neck: Normal " range of motion, No tenderness, Supple, No stridor.   Lymphatic: No lymphadenopathy noted.   Cardiovascular: Regular rate and rhythm, no murmurs. Pulses symmetrical.  Thorax & Lungs: Normal breath sounds, No respiratory distress, No wheezing, No chest tenderness.   Abdomen: Bowel sounds normal, Soft, No tenderness, No masses, No pulsatile masses. No peritoneal signs.  Skin: Warm, Dry, No erythema, No rash.   Back: Normal alignment.  Extremities: Intact distal pulses, No edema, No tenderness, No cyanosis.  Musculoskeletal: No major deformities noted.   Neurologic: Alert, No focal deficits noted.   Psychiatric: Confrontational.     DIAGNOSTIC STUDIES / PROCEDURES    RADIOLOGY  CT-HEAD W/O   Final Result      1.  No acute intracranial abnormality.   2.  See evaluation of facial fractures on dedicated imaging.      CT-MAXILLOFACIAL W/O   Final Result      1.  Comminuted, bilateral nasal bone fractures.   2.  Minimally displaced and comminuted fractures of the nasal septum.   3.  Nondisplaced fracture of the anterior nasal spine.      CT-CSPINE WITHOUT PLUS RECONS   Final Result      No CT evidence of acute cervical spine abnormality.      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  This procedure was performed by Dr. Olivia.  Site: Nose  Sterile procedures observed  Anesthetic used (type and amt): 1% lido w/ epi/2ccs  Suture type/size:4-0 nylon  Length:2.5cm  # of Sutures: 5  Technique: Simple interrupted  Tetanus ordered  Site anesthetized, irrigated with NS, explored without evidence of foreign body, wound well approximated. Patient tolerated procedure well without immediate complications.     COURSE & MEDICAL DECISION MAKING  Pertinent Labs & Imaging studies reviewed. (See chart for details)  This is a 56 year old male with obvious facial injuries and what appears  to be nasal fracture based on clinical exam who is here after a mechanical ground level fall. Patient endorses alcohol use today. He cannot be clinically cleared so was sent for CT head/c-spine to rule out traumatic injury. Also performed was a CT of the maxillofacial region to evaluate for facial fractures. He does not have a septal hematoma on exam. No hemotympanum. Tetanus booster was ordered and laceration was sutured.    CT head and c-spine without acute bony abnormality. CT max/face did demonstrate multiple nasal bone fractures. I did attempt to contact facial fracture but prior to hearing back from the on-call physician, the patient became verbally aggressive and left through the ambulance bay. Staff was able to convince him to return for IV removal. I saw the patient immediately as he was walking back through the doors and tried to verbally deescalate him but unfortunately it was not possible. I did try to have him wait for a dose of augmentin and discharge paperwork but he stormed out of the ED with a steady and stable gait. I sent a prescription for pain medication and antibiotics to his documented pharmacy. Dr. Lopez, Facial Fracture, returned my phone call and will contact the patient tomorrow for follow up.    In prescribing controlled substances to this patient, I certify that I have obtained and reviewed the medical history of Christoph Taylor. I have also made a good leticia effort to obtain applicable records from other providers who have treated the patient and records did not demonstrate any increased risk of substance abuse that would prevent me from prescribing controlled substances.     I have conducted a physical exam and documented it. I have reviewed Mr. Taylor’s prescription history as maintained by the [place] Prescription Monitoring Program.     I have assessed the patient’s risk for abuse, dependency, and addiction using the validated Opioid Risk Tool available at  https://www.[employer].com/bkruxd-envh-fisv-ort-narcotic-abuse.     Given the above, I believe the benefits of controlled substance therapy outweigh the risks. The reasons for prescribing controlled substances include in my professional opinion, controlled substances are the only reasonable choice for this patient because of the nature of the injury. Accordingly, I have discussed the risk and benefits, treatment plan, and alternative therapies with the patient.         FINAL IMPRESSION  1. Closed fracture of nasal bone, initial encounter  amoxicillin-clavulanate (AUGMENTIN) 875-125 MG Tab    oxyCODONE immediate-release (ROXICODONE) 5 MG Tab         Electronically signed by: Juan Carlos Olivia M.D., 12/29/2021 8:32 PM

## 2022-03-18 ENCOUNTER — APPOINTMENT (OUTPATIENT)
Dept: RADIOLOGY | Facility: MEDICAL CENTER | Age: 57
End: 2022-03-18
Payer: COMMERCIAL

## 2022-03-18 ENCOUNTER — HOSPITAL ENCOUNTER (OUTPATIENT)
Facility: MEDICAL CENTER | Age: 57
End: 2022-03-19
Attending: EMERGENCY MEDICINE | Admitting: HOSPITALIST
Payer: COMMERCIAL

## 2022-03-18 DIAGNOSIS — R19.7 DIARRHEA, UNSPECIFIED TYPE: ICD-10-CM

## 2022-03-18 DIAGNOSIS — R73.9 HYPERGLYCEMIA: ICD-10-CM

## 2022-03-18 PROBLEM — E86.0 DEHYDRATION: Status: ACTIVE | Noted: 2022-03-18

## 2022-03-18 PROBLEM — E11.65 UNCONTROLLED TYPE 2 DIABETES MELLITUS WITH HYPERGLYCEMIA (HCC): Status: ACTIVE | Noted: 2022-03-18

## 2022-03-18 LAB
ALBUMIN SERPL BCP-MCNC: 3.4 G/DL (ref 3.2–4.9)
ALBUMIN/GLOB SERPL: 1.3 G/DL
ALP SERPL-CCNC: 87 U/L (ref 30–99)
ALT SERPL-CCNC: 24 U/L (ref 2–50)
ANION GAP SERPL CALC-SCNC: 12 MMOL/L (ref 7–16)
APPEARANCE UR: CLEAR
AST SERPL-CCNC: 17 U/L (ref 12–45)
B-OH-BUTYR SERPL-MCNC: 0.24 MMOL/L (ref 0.02–0.27)
BASOPHILS # BLD AUTO: 0.2 % (ref 0–1.8)
BASOPHILS # BLD: 0.01 K/UL (ref 0–0.12)
BILIRUB SERPL-MCNC: 0.3 MG/DL (ref 0.1–1.5)
BILIRUB UR QL STRIP.AUTO: NEGATIVE
BUN SERPL-MCNC: 18 MG/DL (ref 8–22)
CALCIUM SERPL-MCNC: 8 MG/DL (ref 8.4–10.2)
CHLORIDE SERPL-SCNC: 100 MMOL/L (ref 96–112)
CO2 SERPL-SCNC: 22 MMOL/L (ref 20–33)
COLOR UR: YELLOW
CREAT SERPL-MCNC: 0.77 MG/DL (ref 0.5–1.4)
EOSINOPHIL # BLD AUTO: 0.13 K/UL (ref 0–0.51)
EOSINOPHIL NFR BLD: 2 % (ref 0–6.9)
ERYTHROCYTE [DISTWIDTH] IN BLOOD BY AUTOMATED COUNT: 37.7 FL (ref 35.9–50)
FLUAV RNA SPEC QL NAA+PROBE: NEGATIVE
FLUBV RNA SPEC QL NAA+PROBE: NEGATIVE
G LAMBLIA+C PARVUM AG STL QL RAPID: NORMAL
GFR SERPLBLD CREATININE-BSD FMLA CKD-EPI: 104 ML/MIN/1.73 M 2
GLOBULIN SER CALC-MCNC: 2.7 G/DL (ref 1.9–3.5)
GLUCOSE BLD STRIP.AUTO-MCNC: 372 MG/DL (ref 65–99)
GLUCOSE BLD STRIP.AUTO-MCNC: 387 MG/DL (ref 65–99)
GLUCOSE SERPL-MCNC: 586 MG/DL (ref 65–99)
GLUCOSE UR STRIP.AUTO-MCNC: >=1000 MG/DL
HCT VFR BLD AUTO: 37.3 % (ref 42–52)
HGB BLD-MCNC: 12.6 G/DL (ref 14–18)
IMM GRANULOCYTES # BLD AUTO: 0.02 K/UL (ref 0–0.11)
IMM GRANULOCYTES NFR BLD AUTO: 0.3 % (ref 0–0.9)
KETONES UR STRIP.AUTO-MCNC: NEGATIVE MG/DL
LACTATE BLD-SCNC: 1.6 MMOL/L (ref 0.5–2)
LEUKOCYTE ESTERASE UR QL STRIP.AUTO: NEGATIVE
LIPASE SERPL-CCNC: 28 U/L (ref 7–58)
LYMPHOCYTES # BLD AUTO: 1.07 K/UL (ref 1–4.8)
LYMPHOCYTES NFR BLD: 16.3 % (ref 22–41)
MCH RBC QN AUTO: 28.2 PG (ref 27–33)
MCHC RBC AUTO-ENTMCNC: 33.8 G/DL (ref 33.7–35.3)
MCV RBC AUTO: 83.4 FL (ref 81.4–97.8)
MICRO URNS: ABNORMAL
MONOCYTES # BLD AUTO: 0.42 K/UL (ref 0–0.85)
MONOCYTES NFR BLD AUTO: 6.4 % (ref 0–13.4)
NEUTROPHILS # BLD AUTO: 4.9 K/UL (ref 1.82–7.42)
NEUTROPHILS NFR BLD: 74.8 % (ref 44–72)
NITRITE UR QL STRIP.AUTO: NEGATIVE
NRBC # BLD AUTO: 0 K/UL
NRBC BLD-RTO: 0 /100 WBC
PH UR STRIP.AUTO: 5.5 [PH] (ref 5–8)
PLATELET # BLD AUTO: 233 K/UL (ref 164–446)
PMV BLD AUTO: 9 FL (ref 9–12.9)
POTASSIUM SERPL-SCNC: 3.9 MMOL/L (ref 3.6–5.5)
PROT SERPL-MCNC: 6.1 G/DL (ref 6–8.2)
PROT UR QL STRIP: NEGATIVE MG/DL
RBC # BLD AUTO: 4.47 M/UL (ref 4.7–6.1)
RBC UR QL AUTO: NEGATIVE
RSV RNA SPEC QL NAA+PROBE: NEGATIVE
SARS-COV-2 RNA RESP QL NAA+PROBE: NOTDETECTED
SIGNIFICANT IND 70042: NORMAL
SITE SITE: NORMAL
SODIUM SERPL-SCNC: 134 MMOL/L (ref 135–145)
SOURCE SOURCE: NORMAL
SP GR UR STRIP.AUTO: 1.02
SPECIMEN SOURCE: NORMAL
WBC # BLD AUTO: 6.6 K/UL (ref 4.8–10.8)

## 2022-03-18 PROCEDURE — 87086 URINE CULTURE/COLONY COUNT: CPT

## 2022-03-18 PROCEDURE — 0241U HCHG SARS-COV-2 COVID-19 NFCT DS RESP RNA 4 TRGT MIC: CPT

## 2022-03-18 PROCEDURE — 81003 URINALYSIS AUTO W/O SCOPE: CPT

## 2022-03-18 PROCEDURE — 99285 EMERGENCY DEPT VISIT HI MDM: CPT

## 2022-03-18 PROCEDURE — 87328 CRYPTOSPORIDIUM AG IA: CPT

## 2022-03-18 PROCEDURE — 71045 X-RAY EXAM CHEST 1 VIEW: CPT

## 2022-03-18 PROCEDURE — 96372 THER/PROPH/DIAG INJ SC/IM: CPT

## 2022-03-18 PROCEDURE — 700102 HCHG RX REV CODE 250 W/ 637 OVERRIDE(OP): Performed by: HOSPITALIST

## 2022-03-18 PROCEDURE — 87425 ROTAVIRUS AG IA: CPT

## 2022-03-18 PROCEDURE — G0378 HOSPITAL OBSERVATION PER HR: HCPCS

## 2022-03-18 PROCEDURE — 82010 KETONE BODYS QUAN: CPT

## 2022-03-18 PROCEDURE — 83690 ASSAY OF LIPASE: CPT

## 2022-03-18 PROCEDURE — 700105 HCHG RX REV CODE 258: Performed by: EMERGENCY MEDICINE

## 2022-03-18 PROCEDURE — 82962 GLUCOSE BLOOD TEST: CPT

## 2022-03-18 PROCEDURE — 96374 THER/PROPH/DIAG INJ IV PUSH: CPT

## 2022-03-18 PROCEDURE — 87493 C DIFF AMPLIFIED PROBE: CPT

## 2022-03-18 PROCEDURE — 700102 HCHG RX REV CODE 250 W/ 637 OVERRIDE(OP): Performed by: EMERGENCY MEDICINE

## 2022-03-18 PROCEDURE — 87329 GIARDIA AG IA: CPT

## 2022-03-18 PROCEDURE — 36415 COLL VENOUS BLD VENIPUNCTURE: CPT

## 2022-03-18 PROCEDURE — C9803 HOPD COVID-19 SPEC COLLECT: HCPCS | Performed by: HOSPITALIST

## 2022-03-18 PROCEDURE — 85025 COMPLETE CBC W/AUTO DIFF WBC: CPT

## 2022-03-18 PROCEDURE — 80053 COMPREHEN METABOLIC PANEL: CPT

## 2022-03-18 PROCEDURE — 96375 TX/PRO/DX INJ NEW DRUG ADDON: CPT

## 2022-03-18 PROCEDURE — 700105 HCHG RX REV CODE 258: Performed by: HOSPITALIST

## 2022-03-18 PROCEDURE — 87040 BLOOD CULTURE FOR BACTERIA: CPT

## 2022-03-18 PROCEDURE — 83605 ASSAY OF LACTIC ACID: CPT

## 2022-03-18 PROCEDURE — 99220 PR INITIAL OBSERVATION CARE,LEVL III: CPT | Performed by: HOSPITALIST

## 2022-03-18 RX ORDER — SODIUM CHLORIDE 9 MG/ML
INJECTION, SOLUTION INTRAVENOUS CONTINUOUS
Status: DISCONTINUED | OUTPATIENT
Start: 2022-03-18 | End: 2022-03-19 | Stop reason: HOSPADM

## 2022-03-18 RX ORDER — SODIUM CHLORIDE 9 MG/ML
1000 INJECTION, SOLUTION INTRAVENOUS ONCE
Status: COMPLETED | OUTPATIENT
Start: 2022-03-18 | End: 2022-03-18

## 2022-03-18 RX ORDER — DEXTROSE MONOHYDRATE 25 G/50ML
25 INJECTION, SOLUTION INTRAVENOUS
Status: DISCONTINUED | OUTPATIENT
Start: 2022-03-18 | End: 2022-03-18

## 2022-03-18 RX ORDER — IBUPROFEN 200 MG
400 TABLET ORAL 2 TIMES DAILY PRN
Status: ON HOLD | COMMUNITY
End: 2022-03-19

## 2022-03-18 RX ORDER — INSULIN LISPRO 100 [IU]/ML
0.2 INJECTION, SOLUTION INTRAVENOUS; SUBCUTANEOUS
Status: DISCONTINUED | OUTPATIENT
Start: 2022-03-19 | End: 2022-03-19

## 2022-03-18 RX ORDER — TRAMADOL HYDROCHLORIDE 50 MG/1
50 TABLET ORAL EVERY 6 HOURS PRN
Status: DISCONTINUED | OUTPATIENT
Start: 2022-03-18 | End: 2022-03-19

## 2022-03-18 RX ORDER — SODIUM CHLORIDE, SODIUM LACTATE, POTASSIUM CHLORIDE, AND CALCIUM CHLORIDE .6; .31; .03; .02 G/100ML; G/100ML; G/100ML; G/100ML
30 INJECTION, SOLUTION INTRAVENOUS
Status: DISCONTINUED | OUTPATIENT
Start: 2022-03-18 | End: 2022-03-19 | Stop reason: HOSPADM

## 2022-03-18 RX ORDER — ASPIRIN 81 MG/1
81 TABLET, CHEWABLE ORAL DAILY
Status: DISCONTINUED | OUTPATIENT
Start: 2022-03-19 | End: 2022-03-19 | Stop reason: HOSPADM

## 2022-03-18 RX ORDER — SODIUM CHLORIDE, SODIUM LACTATE, POTASSIUM CHLORIDE, AND CALCIUM CHLORIDE .6; .31; .03; .02 G/100ML; G/100ML; G/100ML; G/100ML
500 INJECTION, SOLUTION INTRAVENOUS
Status: DISCONTINUED | OUTPATIENT
Start: 2022-03-18 | End: 2022-03-19 | Stop reason: HOSPADM

## 2022-03-18 RX ORDER — ACETAMINOPHEN 325 MG/1
650 TABLET ORAL EVERY 6 HOURS PRN
Status: DISCONTINUED | OUTPATIENT
Start: 2022-03-18 | End: 2022-03-19 | Stop reason: HOSPADM

## 2022-03-18 RX ORDER — INSULIN LISPRO 100 [IU]/ML
1-6 INJECTION, SOLUTION INTRAVENOUS; SUBCUTANEOUS
Status: DISCONTINUED | OUTPATIENT
Start: 2022-03-18 | End: 2022-03-19

## 2022-03-18 RX ADMIN — INSULIN GLARGINE 8 UNITS: 100 INJECTION, SOLUTION SUBCUTANEOUS at 22:51

## 2022-03-18 RX ADMIN — INSULIN HUMAN 10 UNITS: 100 INJECTION, SOLUTION PARENTERAL at 16:29

## 2022-03-18 RX ADMIN — SODIUM CHLORIDE 1000 ML: 9 INJECTION, SOLUTION INTRAVENOUS at 16:28

## 2022-03-18 RX ADMIN — INSULIN LISPRO 5 UNITS: 100 INJECTION, SOLUTION INTRAVENOUS; SUBCUTANEOUS at 22:52

## 2022-03-18 RX ADMIN — SODIUM CHLORIDE: 9 INJECTION, SOLUTION INTRAVENOUS at 22:37

## 2022-03-18 ASSESSMENT — COGNITIVE AND FUNCTIONAL STATUS - GENERAL
MOBILITY SCORE: 24
SUGGESTED CMS G CODE MODIFIER DAILY ACTIVITY: CH
SUGGESTED CMS G CODE MODIFIER MOBILITY: CH
DAILY ACTIVITIY SCORE: 24

## 2022-03-18 ASSESSMENT — LIFESTYLE VARIABLES
AVERAGE NUMBER OF DAYS PER WEEK YOU HAVE A DRINK CONTAINING ALCOHOL: 0
ON A TYPICAL DAY WHEN YOU DRINK ALCOHOL HOW MANY DRINKS DO YOU HAVE: 0
HAVE YOU EVER FELT YOU SHOULD CUT DOWN ON YOUR DRINKING: NO
EVER HAD A DRINK FIRST THING IN THE MORNING TO STEADY YOUR NERVES TO GET RID OF A HANGOVER: NO
HOW MANY TIMES IN THE PAST YEAR HAVE YOU HAD 5 OR MORE DRINKS IN A DAY: 0
EVER FELT BAD OR GUILTY ABOUT YOUR DRINKING: NO
TOTAL SCORE: 0
CONSUMPTION TOTAL: NEGATIVE
TOTAL SCORE: 0
HAVE PEOPLE ANNOYED YOU BY CRITICIZING YOUR DRINKING: NO
ALCOHOL_USE: NO
TOTAL SCORE: 0

## 2022-03-18 ASSESSMENT — ENCOUNTER SYMPTOMS
COUGH: 0
FLANK PAIN: 0
MYALGIAS: 0
NERVOUS/ANXIOUS: 0
EYE DISCHARGE: 0
FEVER: 0
STRIDOR: 0
SHORTNESS OF BREATH: 0
ABDOMINAL PAIN: 1
CHILLS: 0
BRUISES/BLEEDS EASILY: 0
NAUSEA: 1
EYE REDNESS: 0
DIARRHEA: 1
FOCAL WEAKNESS: 0

## 2022-03-18 ASSESSMENT — PATIENT HEALTH QUESTIONNAIRE - PHQ9
2. FEELING DOWN, DEPRESSED, IRRITABLE, OR HOPELESS: NOT AT ALL
SUM OF ALL RESPONSES TO PHQ9 QUESTIONS 1 AND 2: 0
1. LITTLE INTEREST OR PLEASURE IN DOING THINGS: NOT AT ALL

## 2022-03-18 ASSESSMENT — FIBROSIS 4 INDEX: FIB4 SCORE: 0.76

## 2022-03-18 ASSESSMENT — PAIN DESCRIPTION - PAIN TYPE: TYPE: ACUTE PAIN

## 2022-03-18 NOTE — ED PROVIDER NOTES
"ED Provider Note    Scribed for Dr. Jorge Green M.D. by Ayde Bolden. 3/18/2022  4:00 PM    Primary care provider: None  Means of arrival: walkin  History obtained from: patient  History limited by: none    CHIEF COMPLAINT  Chief Complaint   Patient presents with   • Abdominal Pain   • Diarrhea       HPI  Christoph Taylor is a 56 y.o. male who presents to the Emergency Department for diarrhea onset the last week. He states that it is \"uncontrollable\" and has a \"bad taste\". Stool is very liquid and leaks during sleep. He has been having to place towels under him to protect his bed. He has had this before but it usually resolves within a week. This course has lasted much longer with no improvement. Associated diffuse epigastric pain, back pain described as a muscle pain, dehydration, and dizziness upon standing. Denies fever. Patient is diabetic and last took insulin yesterday evening. He has not been able to take his dose today because he was not able to get up. He has been having difficulty getting dressed and getting up. Also reports recent fall two days ago.    REVIEW OF SYSTEMS  Pertinent positives include diarrhea, diarrhea, abdominal pain, dehydration, dizziness, fall  Pertinent negatives include no fever.   As above, all other systems reviewed and are negative.   See HPI for further details.     PAST MEDICAL HISTORY   has a past medical history of Diabetes (HCC) and Hypercholesteremia.    SURGICAL HISTORY   has a past surgical history that includes other.    SOCIAL HISTORY  Social History     Tobacco Use   • Smoking status: Former Smoker   • Smokeless tobacco: Never Used   Substance Use Topics   • Alcohol use: Not Currently   • Drug use: Not Currently      Social History     Substance and Sexual Activity   Drug Use Not Currently       FAMILY HISTORY  Family History   Problem Relation Age of Onset   • Heart Disease Father        CURRENT MEDICATIONS  None    ALLERGIES  No Known Allergies    PHYSICAL " EXAM  VITAL SIGNS: BP (!) 91/56 Comment: C/O dizziness  Pulse 98   Temp 36.8 °C (98.3 °F)   Resp 18   Ht 1.829 m (6')   Wt 77.1 kg (169 lb 15.6 oz)   SpO2 97%   BMI 23.05 kg/m²     Constitutional: Well developed, Well nourished, no acute distress, Non-toxic appearance.   HENT: Normocephalic, Atraumatic, Bilateral external ears normal, dry mucus membranes, No oral exudates.   Eyes: PERRLA, EOMI, Conjunctiva normal, No discharge.   Neck: No tenderness, Supple, No stridor.   Lymphatic: No lymphadenopathy noted.   Cardiovascular: Normal heart rate, Normal rhythm.   Thorax & Lungs: Clear to auscultation bilaterally, No respiratory distress, No wheezing, No crackles.   Abdomen: Soft, mild abdominal tenderness, No masses, No pulsatile masses.   Skin: Warm, Dry, No erythema, No rash.   Extremities:, No edema No cyanosis.   Back: mild lower back tenderness  Musculoskeletal: No tenderness to palpation or major deformities noted.  Intact distal pulses  Neurologic: Awake, alert. Moves all extremities spontaneously.  Psychiatric: Affect normal, Judgment normal, Mood normal.     DIAGNOSTIC STUDIES/PROCEDURES  LABS  Results for orders placed or performed during the hospital encounter of 03/18/22   CBC WITH DIFFERENTIAL   Result Value Ref Range    WBC 6.6 4.8 - 10.8 K/uL    RBC 4.47 (L) 4.70 - 6.10 M/uL    Hemoglobin 12.6 (L) 14.0 - 18.0 g/dL    Hematocrit 37.3 (L) 42.0 - 52.0 %    MCV 83.4 81.4 - 97.8 fL    MCH 28.2 27.0 - 33.0 pg    MCHC 33.8 33.7 - 35.3 g/dL    RDW 37.7 35.9 - 50.0 fL    Platelet Count 233 164 - 446 K/uL    MPV 9.0 9.0 - 12.9 fL    Neutrophils-Polys 74.80 (H) 44.00 - 72.00 %    Lymphocytes 16.30 (L) 22.00 - 41.00 %    Monocytes 6.40 0.00 - 13.40 %    Eosinophils 2.00 0.00 - 6.90 %    Basophils 0.20 0.00 - 1.80 %    Immature Granulocytes 0.30 0.00 - 0.90 %    Nucleated RBC 0.00 /100 WBC    Neutrophils (Absolute) 4.90 1.82 - 7.42 K/uL    Lymphs (Absolute) 1.07 1.00 - 4.80 K/uL    Monos (Absolute) 0.42 0.00  - 0.85 K/uL    Eos (Absolute) 0.13 0.00 - 0.51 K/uL    Baso (Absolute) 0.01 0.00 - 0.12 K/uL    Immature Granulocytes (abs) 0.02 0.00 - 0.11 K/uL    NRBC (Absolute) 0.00 K/uL   COMP METABOLIC PANEL   Result Value Ref Range    Sodium 134 (L) 135 - 145 mmol/L    Potassium 3.9 3.6 - 5.5 mmol/L    Chloride 100 96 - 112 mmol/L    Co2 22 20 - 33 mmol/L    Anion Gap 12.0 7.0 - 16.0    Glucose 586 (HH) 65 - 99 mg/dL    Bun 18 8 - 22 mg/dL    Creatinine 0.77 0.50 - 1.40 mg/dL    Calcium 8.0 (L) 8.4 - 10.2 mg/dL    AST(SGOT) 17 12 - 45 U/L    ALT(SGPT) 24 2 - 50 U/L    Alkaline Phosphatase 87 30 - 99 U/L    Total Bilirubin 0.3 0.1 - 1.5 mg/dL    Albumin 3.4 3.2 - 4.9 g/dL    Total Protein 6.1 6.0 - 8.2 g/dL    Globulin 2.7 1.9 - 3.5 g/dL    A-G Ratio 1.3 g/dL   URINALYSIS    Specimen: Urine   Result Value Ref Range    Color Yellow     Character Clear     Specific Gravity 1.020 <1.035    Ph 5.5 5.0 - 8.0    Glucose >=1000 (A) Negative mg/dL    Ketones Negative Negative mg/dL    Protein Negative Negative mg/dL    Bilirubin Negative Negative    Nitrite Negative Negative    Leukocyte Esterase Negative Negative    Occult Blood Negative Negative    Micro Urine Req see below    LACTIC ACID   Result Value Ref Range    Lactic Acid 1.6 0.5 - 2.0 mmol/L   ESTIMATED GFR   Result Value Ref Range    GFR (CKD-EPI) 104 >60 mL/min/1.73 m 2   All labs reviewed by me.    RADIOLOGY  DX-CHEST-PORTABLE (1 VIEW)   Final Result      No evidence of acute cardiopulmonary process.      The radiologist's interpretation of all radiological studies have been reviewed by me.    COURSE & MEDICAL DECISION MAKING  Pertinent Labs & Imaging studies reviewed. (See chart for details)    Differential Diagnosis includes but not limited to: DKA, sepsis, dehydration    4:00 PM - Patient seen and examined at bedside. Patient will be treated with 1000ml NS infusion. Ordered chest XR, cbc with differential, comp metabolic panel, and other labsto evaluate his symptoms.      6:09 PM Paged for hospitalist.    6:13 PM  Spoke to Dr. Casiano, hospitalist, about the patient's case. They will admit for further care and evaluation.    6:15 PM Patient reevaulated at bedside. Patient sleeping. Discussed labs and admission to lower sugars. He is agreeable.     Normal Saline:  HYDRATION: Based on the patient's presentation of Acute Diarrhea and Dehydration the patient was given IV fluids. IV Hydration was used because oral hydration was not adequate alone. Upon recheck following hydration, the patient was improved.    Decision Makin-year-old male with diabetes has had several days of diarrhea and secondary dehydration, hydrated with IV fluids.  Also noted to be hyperglycemic but not acidotic treated with insulin IV.  Discussed with hospitalist as noted above admission for continued hydration and blood sugar control    DISPOSITIO N:  Patient will be hospitalized by Dr. Casiano in guarded condition.    FINAL IMPRESSION  1. Diarrhea, unspecified type    2. Hyperglycemia       Ayde SPEAR (Dolly), am scribing for, and in the presence of, Jorge Green M.D.    Electronically signed by: Ayde Bolden (Dolly), 3/18/2022    Jorge SPEAR M.D. personally performed the services described in this documentation, as scribed by Ayde Bolden in my presence, and it is both accurate and complete.    The note accurately reflects work and decisions made by me.  Jorge Green M.D.  3/18/2022  8:40 PM    C

## 2022-03-18 NOTE — ED TRIAGE NOTES
"Pt amb to triage c/o abd pain and diahrrea x1wk. Pt had GLF r/t \"tripping while going to the bathroom in the middle of the night' x2 night ago. c/o low back pain and bruising to L eye. Pt has hx IDDM.  "

## 2022-03-18 NOTE — ED NOTES
Pt AAO Having diarrhea TNTC Denies blood in it   Pt's glucose running high Pt reports he ate just before coming to ED  Pt to B/R with instn to collect stool specimen  Pt assigned a room Just waiting for it to be clean

## 2022-03-19 VITALS
WEIGHT: 173.72 LBS | TEMPERATURE: 97.9 F | OXYGEN SATURATION: 93 % | BODY MASS INDEX: 23.53 KG/M2 | DIASTOLIC BLOOD PRESSURE: 76 MMHG | SYSTOLIC BLOOD PRESSURE: 118 MMHG | RESPIRATION RATE: 16 BRPM | HEIGHT: 72 IN | HEART RATE: 82 BPM

## 2022-03-19 PROBLEM — E86.0 DEHYDRATION: Status: RESOLVED | Noted: 2022-03-18 | Resolved: 2022-03-19

## 2022-03-19 LAB
ALBUMIN SERPL BCP-MCNC: 3.2 G/DL (ref 3.2–4.9)
ALBUMIN/GLOB SERPL: 1.2 G/DL
ALP SERPL-CCNC: 81 U/L (ref 30–99)
ALT SERPL-CCNC: 20 U/L (ref 2–50)
ANION GAP SERPL CALC-SCNC: 11 MMOL/L (ref 7–16)
AST SERPL-CCNC: 12 U/L (ref 12–45)
BASOPHILS # BLD AUTO: 0.2 % (ref 0–1.8)
BASOPHILS # BLD: 0.01 K/UL (ref 0–0.12)
BILIRUB SERPL-MCNC: 0.3 MG/DL (ref 0.1–1.5)
BUN SERPL-MCNC: 13 MG/DL (ref 8–22)
C DIFF DNA SPEC QL NAA+PROBE: NEGATIVE
C DIFF TOX GENS STL QL NAA+PROBE: NEGATIVE
CALCIUM SERPL-MCNC: 8.2 MG/DL (ref 8.4–10.2)
CHLORIDE SERPL-SCNC: 106 MMOL/L (ref 96–112)
CO2 SERPL-SCNC: 21 MMOL/L (ref 20–33)
CREAT SERPL-MCNC: 0.57 MG/DL (ref 0.5–1.4)
EOSINOPHIL # BLD AUTO: 0.15 K/UL (ref 0–0.51)
EOSINOPHIL NFR BLD: 2.9 % (ref 0–6.9)
ERYTHROCYTE [DISTWIDTH] IN BLOOD BY AUTOMATED COUNT: 38.4 FL (ref 35.9–50)
GFR SERPLBLD CREATININE-BSD FMLA CKD-EPI: 114 ML/MIN/1.73 M 2
GLOBULIN SER CALC-MCNC: 2.7 G/DL (ref 1.9–3.5)
GLUCOSE BLD STRIP.AUTO-MCNC: 224 MG/DL (ref 65–99)
GLUCOSE BLD STRIP.AUTO-MCNC: 240 MG/DL (ref 65–99)
GLUCOSE SERPL-MCNC: 300 MG/DL (ref 65–99)
HCT VFR BLD AUTO: 36.7 % (ref 42–52)
HGB BLD-MCNC: 12.3 G/DL (ref 14–18)
IMM GRANULOCYTES # BLD AUTO: 0.02 K/UL (ref 0–0.11)
IMM GRANULOCYTES NFR BLD AUTO: 0.4 % (ref 0–0.9)
LACTATE BLD-SCNC: 1.3 MMOL/L (ref 0.5–2)
LACTATE BLD-SCNC: 1.5 MMOL/L (ref 0.5–2)
LYMPHOCYTES # BLD AUTO: 1.22 K/UL (ref 1–4.8)
LYMPHOCYTES NFR BLD: 23.3 % (ref 22–41)
MAGNESIUM SERPL-MCNC: 1.7 MG/DL (ref 1.5–2.5)
MCH RBC QN AUTO: 27.7 PG (ref 27–33)
MCHC RBC AUTO-ENTMCNC: 33.5 G/DL (ref 33.7–35.3)
MCV RBC AUTO: 82.7 FL (ref 81.4–97.8)
MONOCYTES # BLD AUTO: 0.46 K/UL (ref 0–0.85)
MONOCYTES NFR BLD AUTO: 8.8 % (ref 0–13.4)
NEUTROPHILS # BLD AUTO: 3.37 K/UL (ref 1.82–7.42)
NEUTROPHILS NFR BLD: 64.4 % (ref 44–72)
NRBC # BLD AUTO: 0 K/UL
NRBC BLD-RTO: 0 /100 WBC
PLATELET # BLD AUTO: 227 K/UL (ref 164–446)
PMV BLD AUTO: 8.9 FL (ref 9–12.9)
POTASSIUM SERPL-SCNC: 3.4 MMOL/L (ref 3.6–5.5)
PROT SERPL-MCNC: 5.9 G/DL (ref 6–8.2)
RBC # BLD AUTO: 4.44 M/UL (ref 4.7–6.1)
RV AG STL QL IA: NORMAL
SIGNIFICANT IND 70042: NORMAL
SITE SITE: NORMAL
SODIUM SERPL-SCNC: 138 MMOL/L (ref 135–145)
SOURCE SOURCE: NORMAL
WBC # BLD AUTO: 5.2 K/UL (ref 4.8–10.8)

## 2022-03-19 PROCEDURE — 85025 COMPLETE CBC W/AUTO DIFF WBC: CPT

## 2022-03-19 PROCEDURE — 83735 ASSAY OF MAGNESIUM: CPT

## 2022-03-19 PROCEDURE — A9270 NON-COVERED ITEM OR SERVICE: HCPCS | Performed by: STUDENT IN AN ORGANIZED HEALTH CARE EDUCATION/TRAINING PROGRAM

## 2022-03-19 PROCEDURE — 700102 HCHG RX REV CODE 250 W/ 637 OVERRIDE(OP): Performed by: HOSPITALIST

## 2022-03-19 PROCEDURE — 80053 COMPREHEN METABOLIC PANEL: CPT

## 2022-03-19 PROCEDURE — 700102 HCHG RX REV CODE 250 W/ 637 OVERRIDE(OP): Performed by: STUDENT IN AN ORGANIZED HEALTH CARE EDUCATION/TRAINING PROGRAM

## 2022-03-19 PROCEDURE — G0378 HOSPITAL OBSERVATION PER HR: HCPCS

## 2022-03-19 PROCEDURE — 96366 THER/PROPH/DIAG IV INF ADDON: CPT

## 2022-03-19 PROCEDURE — 36415 COLL VENOUS BLD VENIPUNCTURE: CPT

## 2022-03-19 PROCEDURE — A9270 NON-COVERED ITEM OR SERVICE: HCPCS | Performed by: HOSPITALIST

## 2022-03-19 PROCEDURE — 83605 ASSAY OF LACTIC ACID: CPT

## 2022-03-19 PROCEDURE — 96372 THER/PROPH/DIAG INJ SC/IM: CPT

## 2022-03-19 PROCEDURE — 96365 THER/PROPH/DIAG IV INF INIT: CPT

## 2022-03-19 PROCEDURE — 700111 HCHG RX REV CODE 636 W/ 250 OVERRIDE (IP): Performed by: HOSPITALIST

## 2022-03-19 PROCEDURE — 700105 HCHG RX REV CODE 258: Performed by: HOSPITALIST

## 2022-03-19 PROCEDURE — 700111 HCHG RX REV CODE 636 W/ 250 OVERRIDE (IP): Performed by: STUDENT IN AN ORGANIZED HEALTH CARE EDUCATION/TRAINING PROGRAM

## 2022-03-19 PROCEDURE — 99217 PR OBSERVATION CARE DISCHARGE: CPT | Performed by: STUDENT IN AN ORGANIZED HEALTH CARE EDUCATION/TRAINING PROGRAM

## 2022-03-19 PROCEDURE — 82962 GLUCOSE BLOOD TEST: CPT

## 2022-03-19 RX ORDER — LACTOBACILLUS RHAMNOSUS GG 10B CELL
1 CAPSULE ORAL 2 TIMES DAILY
Qty: 60 CAPSULE | Refills: 0 | Status: SHIPPED | OUTPATIENT
Start: 2022-03-19 | End: 2022-04-18

## 2022-03-19 RX ORDER — LACTOBACILLUS RHAMNOSUS GG 10B CELL
1 CAPSULE ORAL 2 TIMES DAILY
Status: DISCONTINUED | OUTPATIENT
Start: 2022-03-19 | End: 2022-03-19 | Stop reason: HOSPADM

## 2022-03-19 RX ORDER — POTASSIUM CHLORIDE 20 MEQ/1
40 TABLET, EXTENDED RELEASE ORAL ONCE
Status: COMPLETED | OUTPATIENT
Start: 2022-03-19 | End: 2022-03-19

## 2022-03-19 RX ORDER — INSULIN LISPRO 100 [IU]/ML
1-6 INJECTION, SOLUTION INTRAVENOUS; SUBCUTANEOUS
Status: DISCONTINUED | OUTPATIENT
Start: 2022-03-19 | End: 2022-03-19 | Stop reason: HOSPADM

## 2022-03-19 RX ORDER — MAGNESIUM SULFATE HEPTAHYDRATE 40 MG/ML
2 INJECTION, SOLUTION INTRAVENOUS ONCE
Status: COMPLETED | OUTPATIENT
Start: 2022-03-19 | End: 2022-03-19

## 2022-03-19 RX ORDER — INSULIN LISPRO 100 [IU]/ML
0.2 INJECTION, SOLUTION INTRAVENOUS; SUBCUTANEOUS
Status: DISCONTINUED | OUTPATIENT
Start: 2022-03-19 | End: 2022-03-19 | Stop reason: HOSPADM

## 2022-03-19 RX ORDER — LACTOBACILLUS RHAMNOSUS GG 10B CELL
1 CAPSULE ORAL 2 TIMES DAILY
Qty: 60 CAPSULE | Refills: 0 | Status: CANCELLED | OUTPATIENT
Start: 2022-03-19 | End: 2022-04-18

## 2022-03-19 RX ADMIN — ASPIRIN 81 MG CHEWABLE TABLET 81 MG: 81 TABLET CHEWABLE at 05:29

## 2022-03-19 RX ADMIN — MAGNESIUM SULFATE 2 G: 2 INJECTION INTRAVENOUS at 08:03

## 2022-03-19 RX ADMIN — Medication 1 CAPSULE: at 11:23

## 2022-03-19 RX ADMIN — INSULIN LISPRO 5 UNITS: 100 INJECTION, SOLUTION INTRAVENOUS; SUBCUTANEOUS at 11:23

## 2022-03-19 RX ADMIN — TRAMADOL HYDROCHLORIDE 50 MG: 50 TABLET, COATED ORAL at 00:22

## 2022-03-19 RX ADMIN — INSULIN LISPRO 2 UNITS: 100 INJECTION, SOLUTION INTRAVENOUS; SUBCUTANEOUS at 06:04

## 2022-03-19 RX ADMIN — ENOXAPARIN SODIUM 40 MG: 40 INJECTION SUBCUTANEOUS at 05:29

## 2022-03-19 RX ADMIN — SODIUM CHLORIDE: 9 INJECTION, SOLUTION INTRAVENOUS at 08:04

## 2022-03-19 RX ADMIN — INSULIN LISPRO 2 UNITS: 100 INJECTION, SOLUTION INTRAVENOUS; SUBCUTANEOUS at 11:24

## 2022-03-19 RX ADMIN — POTASSIUM CHLORIDE 40 MEQ: 20 TABLET, EXTENDED RELEASE ORAL at 08:02

## 2022-03-19 RX ADMIN — INSULIN LISPRO 5 UNITS: 100 INJECTION, SOLUTION INTRAVENOUS; SUBCUTANEOUS at 06:02

## 2022-03-19 ASSESSMENT — PAIN DESCRIPTION - PAIN TYPE
TYPE: ACUTE PAIN

## 2022-03-19 ASSESSMENT — FIBROSIS 4 INDEX: FIB4 SCORE: 0.67

## 2022-03-19 NOTE — DISCHARGE SUMMARY
Discharge Summary    CHIEF COMPLAINT ON ADMISSION  Chief Complaint   Patient presents with   • Abdominal Pain   • Diarrhea       Reason for Admission  Diarrhea; abdominal pain     Admission Date  3/18/2022    CODE STATUS  Full Code    HPI & HOSPITAL COURSE  A 56 y.o. male with medical history of uncontrolled diabetes who presented 3/18/2022 with generalized weakness diarrhea and abdominal pain.  Patient reports that he has not been feeling well over the past 1 week.  He reports that he has been having frequent loose bowel movements that have been progressively worsening.  He does also have diffuse moderate abdominal pain.  No aggravating or alleviating factors of the pain.  Pain does radiate to the back.  Pain is dull in character.  He denies having fevers or chills but reports having altered taste and nausea. IVF & replete electrolytes as needed.Lipase - WNL , negative COVID , stool infectious studies negative so far. C Diff negative. Pt might be having viral GE. Probiotics BID initiated.   Uncontrolled diabetes ( on admit -  ) improving with IVF & insulin- pt is also on insulin at home,  education done & needs close follow up with PCP.     Therefore, he is discharged in fair and stable condition to home with close outpatient follow-up.    The patient recovered much more quickly than anticipated on admission.    Discharge Date  3/19/2022    FOLLOW UP ITEMS POST DISCHARGE  PCP    DISCHARGE DIAGNOSES  Active Problems:    CKD (chronic kidney disease) stage 3, GFR 30-59 ml/min ([facility]) POA: Yes    Uncontrolled type 2 diabetes mellitus with hyperglycemia ([facility]) POA: Yes    Diarrhea POA: Yes  Resolved Problems:    Dehydration POA: Yes      FOLLOW UP  No future appointments.  No follow-up provider specified.    MEDICATIONS ON DISCHARGE     Medication List      START taking these medications      Instructions   lactobacillus rhamnosus Caps capsule   Take 1 Capsule by mouth 2 times a day for 30 days.  Dose: 1 Capsule         CONTINUE taking these medications      Instructions   Aspirin Low Dose 81 MG Chew chewable tablet  Generic drug: aspirin   Chew 1 Tablet every day.  Dose: 81 mg     NOVOLIN 70/30 FLEXPEN SC   Inject 28 Units under the skin 2 times a day.  Dose: 28 Units        STOP taking these medications    ibuprofen 200 MG Tabs  Commonly known as: MOTRIN            Allergies  No Known Allergies    DIET  Orders Placed This Encounter   Procedures   • Diet Order Diet: Consistent CHO (Diabetic)     Standing Status:   Standing     Number of Occurrences:   1     Order Specific Question:   Diet:     Answer:   Consistent CHO (Diabetic) [4]       ACTIVITY  As tolerated.  Weight bearing as tolerated    CONSULTATIONS  None     PROCEDURES  None     LABORATORY  Lab Results   Component Value Date    SODIUM 138 03/19/2022    POTASSIUM 3.4 (L) 03/19/2022    CHLORIDE 106 03/19/2022    CO2 21 03/19/2022    GLUCOSE 300 (H) 03/19/2022    BUN 13 03/19/2022    CREATININE 0.57 03/19/2022        Lab Results   Component Value Date    WBC 5.2 03/19/2022    HEMOGLOBIN 12.3 (L) 03/19/2022    HEMATOCRIT 36.7 (L) 03/19/2022    PLATELETCT 227 03/19/2022        Total time of the discharge process exceeds 36 minutes.

## 2022-03-19 NOTE — PROGRESS NOTES
Hospital Medicine Daily Progress Note    Date of Service  3/19/2022    Chief Complaint  Christoph Taylor is a 56 y.o. male admitted 3/18/2022 with diffuse diarrhea and generalized weakness.    Hospital Course  A 56 y.o. male with a past medical history of diabetes who presented 3/18/2022 with generalized weakness diarrhea and abdominal pain.  Patient reports that he has not been feeling well over the past 1 week.  He reports that he has been having frequent loose bowel movements that have been progressively worsening.  He does also have diffuse moderate abdominal pain.  No aggravating or alleviating factors of the pain.  Pain does radiate to the back.  Pain is dull in character.  He denies having fevers or chills but reports having altered taste and nausea.     Interval Problem Update  Patient was seen and examined at bedside.  Patient complain of generalized weakness and stay ongoing diffuse diarrhea.  IVF & replete electrolytes as needed.  Lipase - WNL , negative COVID , stool infectious studies negative so far. C Diff negative.   Uncontrolled diabetes-increased insulin glargine for better glycemic control.    I have personally seen and examined the patient at bedside. I discussed the plan of care with patient, bedside RN, charge RN,  and pharmacy.    Consultants/Specialty  None     Code Status  Full Code    Disposition  Patient is not medically cleared for discharge.   Anticipate discharge to to home with close outpatient follow-up.  I have placed the appropriate orders for post-discharge needs.    Review of Systems  ROS     Physical Exam  Temp:  [36 °C (96.8 °F)-36.8 °C (98.3 °F)] 36.6 °C (97.9 °F)  Pulse:  [] 82  Resp:  [16-18] 16  BP: ()/(49-85) 118/76  SpO2:  [90 %-98 %] 93 %    Physical Exam    Fluids    Intake/Output Summary (Last 24 hours) at 3/19/2022 1054  Last data filed at 3/19/2022 0825  Gross per 24 hour   Intake 1340 ml   Output --   Net 1340 ml       Laboratory  Recent Labs      03/18/22  1420 03/19/22  0155   WBC 6.6 5.2   RBC 4.47* 4.44*   HEMOGLOBIN 12.6* 12.3*   HEMATOCRIT 37.3* 36.7*   MCV 83.4 82.7   MCH 28.2 27.7   MCHC 33.8 33.5*   RDW 37.7 38.4   PLATELETCT 233 227   MPV 9.0 8.9*     Recent Labs     03/18/22  1420 03/19/22  0155   SODIUM 134* 138   POTASSIUM 3.9 3.4*   CHLORIDE 100 106   CO2 22 21   GLUCOSE 586* 300*   BUN 18 13   CREATININE 0.77 0.57   CALCIUM 8.0* 8.2*                   Imaging  DX-CHEST-PORTABLE (1 VIEW)   Final Result      No evidence of acute cardiopulmonary process.           Assessment/Plan  Dehydration- (present on admission)  Assessment & Plan  Likely secondary to increase in sensory losses through osmotic polyuria.  Control blood sugars  Encourage oral intake as tolerated, antiemetics as needed.  Intravenous hydration until adequate oral intake is achieved    Diarrhea- (present on admission)  Assessment & Plan  Differentials include peripheral neuropathy caused by poorly controlled diabetes, viral infections, C. difficile colitis   Supportive care with intravenous fluids monitor electrolytes and replace accordingly     Lipase - WNL , negative COVID , stool infectious studies negative so far. C Diff negative.   IVF       Uncontrolled type 2 diabetes mellitus with hyperglycemia (HCC)- (present on admission)  Assessment & Plan  With marked hyperglycemia 586   Last glycated hemoglobin was 14 %  I will start Premeal, Long & short acting insulin  Accu-Checks, hypoglycemia protocol   Uncontrolled diabetes-increased insulin glargine for better glycemic control.  DM diet    CKD (chronic kidney disease) stage 3, GFR 30-59 ml/min ([facility])- (present on admission)  Assessment & Plan  Avoid/minimize nephrotoxins as much as possible, renally dose medications, monitor inputs and outputs        VTE prophylaxis: SCDs/TEDs and enoxaparin ppx    I have performed a physical exam and reviewed and updated ROS and Plan today (3/19/2022). In review of yesterday's note (3/18/2022),  there are no changes except as documented above.

## 2022-03-19 NOTE — H&P
Hospital Medicine History & Physical Note    Date of Service  3/18/2022    Primary Care Physician  Pcp Pt States None    Consultants  None     Code Status  Full Code    Chief Complaint  Chief Complaint   Patient presents with   • Abdominal Pain   • Diarrhea     History of Presenting Illness  Christoph Taylor is a 56 y.o. male with a past medical history of diabetes who presented 3/18/2022 with generalized weakness diarrhea and abdominal pain.  Patient reports that he has not been feeling well over the past 1 week.  He reports that he has been having frequent loose bowel movements that have been progressively worsening.  He does also have diffuse moderate abdominal pain.  No aggravating or alleviating factors of the pain.  Pain does radiate to the back.  Pain is dull in character.  He denies having fevers or chills but reports having altered taste and nausea.     I discussed the plan of care with emergency department physician, and the patient.    Review of Systems  Review of Systems   Constitutional: Positive for malaise/fatigue. Negative for chills and fever.   Eyes: Negative for discharge and redness.   Respiratory: Negative for cough, shortness of breath and stridor.    Cardiovascular: Negative for chest pain and leg swelling.   Gastrointestinal: Positive for abdominal pain, diarrhea and nausea.   Genitourinary: Negative for flank pain.   Musculoskeletal: Negative for myalgias.   Skin: Negative.    Neurological: Negative for focal weakness.   Endo/Heme/Allergies: Does not bruise/bleed easily.   Psychiatric/Behavioral: The patient is not nervous/anxious.      Past Medical History   has a past medical history of Diabetes (HCC) and Hypercholesteremia.    Surgical History   has a past surgical history that includes other.     Family History  family history includes Heart Disease in his father.      Social History   reports that he has quit smoking. He has never used smokeless tobacco. He reports previous alcohol use.  He reports previous drug use.    Allergies  No Known Allergies    Medications  Prior to Admission Medications   Prescriptions Last Dose Informant Patient Reported? Taking?   Insulin NPH Isophane & Regular (NOVOLIN 70/30 FLEXPEN SC) 3/17/2022 at PM Patient Yes Yes   Sig: Inject 28 Units under the skin 2 times a day.   aspirin (ASA) 81 MG Chew Tab chewable tablet FEW DAYS AGO at UNK Patient No No   Sig: Chew 1 Tablet every day.   ibuprofen (MOTRIN) 200 MG Tab 3/17/2022 at PM Patient Yes Yes   Sig: Take 400 mg by mouth 2 times a day as needed. Indications: Pain      Facility-Administered Medications: None     Physical Exam  Temp:  [36 °C (96.8 °F)-36.8 °C (98.3 °F)] 36.8 °C (98.3 °F)  Pulse:  [] 98  Resp:  [18] 18  BP: ()/(49-85) 148/85  SpO2:  [90 %-97 %] 92 %  Blood Pressure: 148/85   Temperature: 36.8 °C (98.3 °F)   Pulse: 98   Respiration: 18   Pulse Oximetry: 92 %     Physical Exam  Constitutional:       General: He is not in acute distress.     Appearance: He is not ill-appearing or diaphoretic.   HENT:      Head: Atraumatic.      Right Ear: External ear normal.      Left Ear: External ear normal.      Nose: No congestion or rhinorrhea.      Mouth/Throat:      Mouth: Mucous membranes are dry.   Eyes:      General: No scleral icterus.        Right eye: No discharge.         Left eye: No discharge.      Pupils: Pupils are equal, round, and reactive to light.   Cardiovascular:      Rate and Rhythm: Regular rhythm. Tachycardia present.   Pulmonary:      Effort: Pulmonary effort is normal.   Abdominal:      General: There is no distension.   Musculoskeletal:      Cervical back: Neck supple. No rigidity. No muscular tenderness.      Right lower leg: No edema.      Left lower leg: No edema.   Skin:     General: Skin is dry.      Capillary Refill: Capillary refill takes 2 to 3 seconds.      Coloration: Skin is pale. Skin is not jaundiced.   Neurological:      Mental Status: He is alert and oriented to person,  place, and time.      Coordination: Coordination normal.   Psychiatric:         Mood and Affect: Mood normal.         Behavior: Behavior normal.       Laboratory:  Recent Labs     03/18/22  1420   WBC 6.6   RBC 4.47*   HEMOGLOBIN 12.6*   HEMATOCRIT 37.3*   MCV 83.4   MCH 28.2   MCHC 33.8   RDW 37.7   PLATELETCT 233   MPV 9.0     Recent Labs     03/18/22  1420   SODIUM 134*   POTASSIUM 3.9   CHLORIDE 100   CO2 22   GLUCOSE 586*   BUN 18   CREATININE 0.77   CALCIUM 8.0*     Recent Labs     03/18/22  1420   ALTSGPT 24   ASTSGOT 17   ALKPHOSPHAT 87   TBILIRUBIN 0.3   LIPASE 28   GLUCOSE 586*         No results for input(s): NTPROBNP in the last 72 hours.      No results for input(s): TROPONINT in the last 72 hours.    Imaging:  DX-CHEST-PORTABLE (1 VIEW)   Final Result      No evidence of acute cardiopulmonary process.        Assessment/Plan:  I anticipate this patient is appropriate for observation status at this time.    Diarrhea- (present on admission)  Assessment & Plan  Differentials include peripheral neuropathy caused by poorly controlled diabetes, viral infections, C. difficile colitis   Rule out COVID-19  I will check stool for C. difficile toxins.  I will check for rotavirus, crypto, Giardia.  Supportive care with intravenous fluids monitor electrolytes and replace accordingly     Uncontrolled type 2 diabetes mellitus with hyperglycemia (HCC)- (present on admission)  Assessment & Plan  With marked hyperglycemia 586   Last glycated hemoglobin was 14 %  I will start Premeal, Long & short acting insulin  Accu-Checks, hypoglycemia protocol     Dehydration- (present on admission)  Assessment & Plan  Likely secondary to increase in sensory losses through osmotic polyuria.  Control blood sugars  Encourage oral intake as tolerated, antiemetics as needed.  Intravenous hydration until adequate oral intake is achieved    CKD (chronic kidney disease) stage 3, GFR 30-59 ml/min ([facility])- (present on admission)  Assessment &  Plan  Avoid/minimize nephrotoxins as much as possible, renally dose medications, monitor inputs and outputs     VTE prophylaxis: SCDs/TEDs and enoxaparin ppx

## 2022-03-19 NOTE — PROGRESS NOTES
Verified from pharmacist if we can give Aspirin and Lovenox together, according to Merlin no problem

## 2022-03-19 NOTE — ASSESSMENT & PLAN NOTE
Avoid/minimize nephrotoxins as much as possible, renally dose medications, monitor inputs and outputs

## 2022-03-19 NOTE — ASSESSMENT & PLAN NOTE
Differentials include peripheral neuropathy caused by poorly controlled diabetes, viral infections, C. difficile colitis   Supportive care with intravenous fluids monitor electrolytes and replace accordingly     Lipase - WNL , negative COVID , stool infectious studies negative so far. C Diff negative.   IVF

## 2022-03-19 NOTE — PROGRESS NOTES
2303H Pt c/o of back pain, pain score of 7/10, he said Tylenol is not effective, Dr Macdonald informed

## 2022-03-19 NOTE — ASSESSMENT & PLAN NOTE
With marked hyperglycemia 586   Last glycated hemoglobin was 14 %  I will start Premeal, Long & short acting insulin  Accu-Checks, hypoglycemia protocol   Uncontrolled diabetes-increased insulin glargine for better glycemic control.  DM diet

## 2022-03-19 NOTE — PROGRESS NOTES
4 Eyes Skin Assessment Completed by PENNIE Hallman and PENNIE Hidalgo.    Head WDL  Ears WDL  Nose WDL  Mouth WDL  Neck WDL  Breast/Chest WDL  Shoulder Blades WDL  Spine WDL  (R) Arm/Elbow/Hand WDL  (L) Arm/Elbow/Hand old wound cut on left hand  Abdomen WDL  Groin WDL  Scrotum/Coccyx/Buttocks Redness  (R) Leg old abrasion   (L) Leg Redness   (R) Heel/Foot/Toe WDL  (L) Heel/Foot/Toe WDL          Devices In Places Ortho Boot/Shoe      Interventions In Place N/A    Possible Skin Injury No    Pictures Uploaded Into Epic N/A  Wound Consult Placed N/A  RN Wound Prevention Protocol Ordered No

## 2022-03-19 NOTE — ED NOTES
Asleep in NAD. Breathing regular, nonlabored. IVF infusing. Will continue to assess and treat as appropriate.

## 2022-03-19 NOTE — ASSESSMENT & PLAN NOTE
Likely secondary to increase in sensory losses through osmotic polyuria.  Control blood sugars  Encourage oral intake as tolerated, antiemetics as needed.  Intravenous hydration until adequate oral intake is achieved

## 2022-03-19 NOTE — PROGRESS NOTES
Pt arrived via gurney, admitted to room 207 Pt is A&Ox4,  Oriented to room call light and smoking policy. Reviewed plan of care with the patient and the family. Fall precaution in place. Bed locked. Bet at lowest position. Call light within reach. Encouraged pt the importance to call for assistance. Continue to monitor. Hourly rounding in progress

## 2022-03-19 NOTE — DISCHARGE INSTRUCTIONS
Discharge Instructions    Discharged to home by car with self. Discharged via wheelchair, hospital escort: Yes.  Special equipment needed: Not Applicable    Be sure to schedule a follow-up appointment with your primary care doctor or any specialists as instructed.     Discharge Plan:   Diet Plan: (P) Discussed  Activity Level: (P) Discussed  Confirmed Follow up Appointment: (P) Patient to Call and Schedule Appointment  Confirmed Symptoms Management: (P) Discussed  Medication Reconciliation Updated: (P) Yes  Influenza Vaccine Indication: (P) Not indicated: Previously immunized this influenza season and > 8 years of age    I understand that a diet low in cholesterol, fat, and sodium is recommended for good health. Unless I have been given specific instructions below for another diet, I accept this instruction as my diet prescription.   Other diet: diabetic    Special Instructions: None    · Is patient discharged on Warfarin / Coumadin?   No     Depression / Suicide Risk    As you are discharged from this [facility] facility, it is important to learn how to keep safe from harming yourself.    Recognize the warning signs:  · Abrupt changes in personality, positive or negative- including increase in energy   · Giving away possessions  · Change in eating patterns- significant weight changes-  positive or negative  · Change in sleeping patterns- unable to sleep or sleeping all the time   · Unwillingness or inability to communicate  · Depression  · Unusual sadness, discouragement and loneliness  · Talk of wanting to die  · Neglect of personal appearance   · Rebelliousness- reckless behavior  · Withdrawal from people/activities they love  · Confusion- inability to concentrate     If you or a loved one observes any of these behaviors or has concerns about self-harm, here's what you can do:  · Talk about it- your feelings and reasons for harming yourself  · Remove any means that you might use to hurt yourself (examples:  pills, rope, extension cords, firearm)  · Get professional help from the community (Mental Health, Substance Abuse, psychological counseling)  · Do not be alone:Call your Safe Contact- someone whom you trust who will be there for you.  · Call your local CRISIS HOTLINE 994-3038 or 432-720-2895  · Call your local Children's Mobile Crisis Response Team [place] (833) 316-9951 or www.Unbabel  · Call the toll free National Suicide Prevention Hotlines   · National Suicide Prevention Lifeline 000-738-MGPM (3742)  · National Hope Line Network 800-SUICIDE (330-3934)      Diabetes Mellitus and Nutrition, Adult  When you have diabetes (diabetes mellitus), it is very important to have healthy eating habits because your blood sugar (glucose) levels are greatly affected by what you eat and drink. Eating healthy foods in the appropriate amounts, at about the same times every day, can help you:  · Control your blood glucose.  · Lower your risk of heart disease.  · Improve your blood pressure.  · Reach or maintain a healthy weight.  Every person with diabetes is different, and each person has different needs for a meal plan. Your health care provider may recommend that you work with a diet and nutrition specialist (dietitian) to make a meal plan that is best for you. Your meal plan may vary depending on factors such as:  · The calories you need.  · The medicines you take.  · Your weight.  · Your blood glucose, blood pressure, and cholesterol levels.  · Your activity level.  · Other health conditions you have, such as heart or kidney disease.  How do carbohydrates affect me?  Carbohydrates, also called carbs, affect your blood glucose level more than any other type of food. Eating carbs naturally raises the amount of glucose in your blood. Carb counting is a method for keeping track of how many carbs you eat. Counting carbs is important to keep your blood glucose at a healthy level, especially if you use insulin or take  "certain oral diabetes medicines.  It is important to know how many carbs you can safely have in each meal. This is different for every person. Your dietitian can help you calculate how many carbs you should have at each meal and for each snack.  Foods that contain carbs include:  · Bread, cereal, rice, pasta, and crackers.  · Potatoes and corn.  · Peas, beans, and lentils.  · Milk and yogurt.  · Fruit and juice.  · Desserts, such as cakes, cookies, ice cream, and candy.  How does alcohol affect me?  Alcohol can cause a sudden decrease in blood glucose (hypoglycemia), especially if you use insulin or take certain oral diabetes medicines. Hypoglycemia can be a life-threatening condition. Symptoms of hypoglycemia (sleepiness, dizziness, and confusion) are similar to symptoms of having too much alcohol.  If your health care provider says that alcohol is safe for you, follow these guidelines:  · Limit alcohol intake to no more than 1 drink per day for nonpregnant women and 2 drinks per day for men. One drink equals 12 oz of beer, 5 oz of wine, or 1½ oz of hard liquor.  · Do not drink on an empty stomach.  · Keep yourself hydrated with water, diet soda, or unsweetened iced tea.  · Keep in mind that regular soda, juice, and other mixers may contain a lot of sugar and must be counted as carbs.  What are tips for following this plan?    Reading food labels  · Start by checking the serving size on the \"Nutrition Facts\" label of packaged foods and drinks. The amount of calories, carbs, fats, and other nutrients listed on the label is based on one serving of the item. Many items contain more than one serving per package.  · Check the total grams (g) of carbs in one serving. You can calculate the number of servings of carbs in one serving by dividing the total carbs by 15. For example, if a food has 30 g of total carbs, it would be equal to 2 servings of carbs.  · Check the number of grams (g) of saturated and trans fats in one " "serving. Choose foods that have low or no amount of these fats.  · Check the number of milligrams (mg) of salt (sodium) in one serving. Most people should limit total sodium intake to less than 2,300 mg per day.  · Always check the nutrition information of foods labeled as \"low-fat\" or \"nonfat\". These foods may be higher in added sugar or refined carbs and should be avoided.  · Talk to your dietitian to identify your daily goals for nutrients listed on the label.  Shopping  · Avoid buying canned, premade, or processed foods. These foods tend to be high in fat, sodium, and added sugar.  · Shop around the outside edge of the grocery store. This includes fresh fruits and vegetables, bulk grains, fresh meats, and fresh dairy.  Cooking  · Use low-heat cooking methods, such as baking, instead of high-heat cooking methods like deep frying.  · Cook using healthy oils, such as olive, canola, or sunflower oil.  · Avoid cooking with butter, cream, or high-fat meats.  Meal planning  · Eat meals and snacks regularly, preferably at the same times every day. Avoid going long periods of time without eating.  · Eat foods high in fiber, such as fresh fruits, vegetables, beans, and whole grains. Talk to your dietitian about how many servings of carbs you can eat at each meal.  · Eat 4-6 ounces (oz) of lean protein each day, such as lean meat, chicken, fish, eggs, or tofu. One oz of lean protein is equal to:  ? 1 oz of meat, chicken, or fish.  ? 1 egg.  ? ¼ cup of tofu.  · Eat some foods each day that contain healthy fats, such as avocado, nuts, seeds, and fish.  Lifestyle  · Check your blood glucose regularly.  · Exercise regularly as told by your health care provider. This may include:  ? 150 minutes of moderate-intensity or vigorous-intensity exercise each week. This could be brisk walking, biking, or water aerobics.  ? Stretching and doing strength exercises, such as yoga or weightlifting, at least 2 times a week.  · Take medicines " as told by your health care provider.  · Do not use any products that contain nicotine or tobacco, such as cigarettes and e-cigarettes. If you need help quitting, ask your health care provider.  · Work with a counselor or diabetes educator to identify strategies to manage stress and any emotional and social challenges.  Questions to ask a health care provider  · Do I need to meet with a diabetes educator?  · Do I need to meet with a dietitian?  · What number can I call if I have questions?  · When are the best times to check my blood glucose?  Where to find more information:  · American Diabetes Association: diabetes.org  · Academy of Nutrition and Dietetics: www.eatright.org  · National [place] of Diabetes and Digestive and Kidney Diseases (NIH): www.niddk.nih.gov  Summary  · A healthy meal plan will help you control your blood glucose and maintain a healthy lifestyle.  · Working with a diet and nutrition specialist (dietitian) can help you make a meal plan that is best for you.  · Keep in mind that carbohydrates (carbs) and alcohol have immediate effects on your blood glucose levels. It is important to count carbs and to use alcohol carefully.  This information is not intended to replace advice given to you by your health care provider. Make sure you discuss any questions you have with your health care provider.  Document Released: 09/14/2006 Document Revised: 11/30/2018 Document Reviewed: 01/22/2018  [employer] Patient Education © 2020 [employer]    Probiotics  Probiotics are the good bacteria and yeasts that live in your body and keep your digestive system healthy. Probiotics also help your body's defense system (immune system) and protect your body against the growth of harmful bacteria. Your health care provider may recommend taking a probiotic if you are taking antibiotics or have certain medical conditions, such as:  · Diarrhea.  · Constipation.  · Irritable bowel syndrome.  · Lung infections.  · Yeast  infections.  · Acne, eczema, and other skin conditions.  · Frequent urinary tract infections.  What affects the balance of bacteria in my body?  The balance of good bacteria in your body can be affected by:  · Antibiotic medicines. These medicines treat infections caused by bacteria. Unfortunately, they may kill the good bacteria in your body as well as the bad bacteria.  · Certain medical conditions. Conditions related to an imbalance of bacteria include:  ? Stomach and intestine (gastrointestinal) infections.  ? Lung infections.  ? Skin infections.  ? Vaginal infections.  ? Inflammatory bowel diseases.  ? Stomach ulcers (gastric ulcers).  ? Tooth decay and gum disease (periodontal disease).  · Stress.  · Poor diet.  What type of probiotic is right for me?  Probiotics contain different types of bacteria (strains). Strains commonly found in probiotics include:  · Lactobacillus.  · Saccharomyces.  · Bifidobacterium.  Specific strains have been shown to be more effective for certain health conditions. Ask your health care provider which strain or strains you should use and how often.  Probiotics come in many different forms, strain combinations, and strengths. Some may need to be refrigerated. Always read the label for storage and usage instructions.  Certain foods, such as yogurt, contain probiotics. Probiotics can also be bought as a supplement at a pharmacy, health food store, or grocery store. Talk to your health care provider before starting any supplement.  What are the side effects of probiotics?  Some people have side effects when taking probiotics. Side effects are usually temporary and may include:  · Gas.  · Bloating.  · Cramping.  Serious side effects are rare.  Follow these instructions at home:    · If you are taking probiotics with antibiotics:  ? Wait at least 2 hours between taking your medicine and the probiotic.  ? Eat foods high in fiber, such as whole grains, beans, and vegetables. These foods can  help good bacteria grow.  ? Avoid certain foods as told by your health care provider.  Summary  · Probiotics are the good bacteria and yeasts that live in your body and keep you and your digestive system healthy.  · Certain foods, such as yogurt, contain probiotics.  · Probiotics can be taken as supplements. They can be bought at a pharmacy, health food store, or grocery store. They come in many different forms, strain combinations, and strengths.  · Be sure to talk with your health care provider before taking a probiotic supplement.  This information is not intended to replace advice given to you by your health care provider. Make sure you discuss any questions you have with your health care provider.  Document Released: 07/15/2015 Document Revised: 09/06/2019 Document Reviewed: 01/02/2019  [employer] Patient Education © 2020 [employer]

## 2022-03-19 NOTE — PROGRESS NOTES
Received report from NOC RN. Pt is A&Ox4. VSS. No current complaints of nausea or vomiting. Complained of some mild abdominal pain 2/10, refusing any pain medication at this time. All questions answered at this time. Hourly rounding in place.

## 2022-03-19 NOTE — CARE PLAN
The patient is Stable - Low risk of patient condition declining or worsening    Shift Goals  Clinical Goals: blood sugar control with diet adherene  Patient Goals: comfort and rest    Problem: Pain - Standard  Goal: Alleviation of pain or a reduction in pain to the patient’s comfort goal  Outcome: Met     Problem: Knowledge Deficit - Standard  Goal: Patient and family/care givers will demonstrate understanding of plan of care, disease process/condition, diagnostic tests and medications  Outcome: Met     Problem: Fluid Volume  Goal: Fluid volume balance will be maintained  Outcome: Met     Problem: Urinary - Renal Perfusion  Goal: Ability to achieve and maintain adequate renal perfusion and functioning will improve  Outcome: Met     Problem: Fall Risk  Goal: Patient will remain free from falls  Outcome: Met     Progress made toward(s) clinical / shift goals:  Pt tolerating diabetic diet and blood sugar control. Pt aware of limitations to do a diabetic diet. Progressing on other goals.     Patient is not progressing towards the following goals:

## 2022-03-20 LAB
BACTERIA UR CULT: NORMAL
SIGNIFICANT IND 70042: NORMAL
SITE SITE: NORMAL
SOURCE SOURCE: NORMAL

## 2022-03-23 LAB
BACTERIA BLD CULT: NORMAL
SIGNIFICANT IND 70042: NORMAL
SITE SITE: NORMAL
SOURCE SOURCE: NORMAL

## 2022-03-27 LAB
BACTERIA BLD CULT: ABNORMAL
SIGNIFICANT IND 70042: ABNORMAL
SITE SITE: ABNORMAL
SOURCE SOURCE: ABNORMAL

## 2022-07-26 ENCOUNTER — TELEPHONE (OUTPATIENT)
Dept: HEALTH INFORMATION MANAGEMENT | Facility: OTHER | Age: 57
End: 2022-07-26

## 2022-07-26 ENCOUNTER — APPOINTMENT (OUTPATIENT)
Dept: RADIOLOGY | Facility: MEDICAL CENTER | Age: 57
End: 2022-07-26
Attending: EMERGENCY MEDICINE
Payer: COMMERCIAL

## 2022-07-26 ENCOUNTER — HOSPITAL ENCOUNTER (EMERGENCY)
Facility: MEDICAL CENTER | Age: 57
End: 2022-07-27
Attending: EMERGENCY MEDICINE
Payer: COMMERCIAL

## 2022-07-26 VITALS
HEART RATE: 82 BPM | WEIGHT: 158.73 LBS | HEIGHT: 72 IN | RESPIRATION RATE: 18 BRPM | SYSTOLIC BLOOD PRESSURE: 117 MMHG | TEMPERATURE: 97.1 F | OXYGEN SATURATION: 96 % | DIASTOLIC BLOOD PRESSURE: 78 MMHG | BODY MASS INDEX: 21.5 KG/M2

## 2022-07-26 DIAGNOSIS — E08.65 DIABETES MELLITUS DUE TO UNDERLYING CONDITION WITH HYPERGLYCEMIA, WITH LONG-TERM CURRENT USE OF INSULIN (HCC): ICD-10-CM

## 2022-07-26 DIAGNOSIS — M54.6 ACUTE MIDLINE THORACIC BACK PAIN: ICD-10-CM

## 2022-07-26 DIAGNOSIS — K59.00 CONSTIPATION, UNSPECIFIED CONSTIPATION TYPE: ICD-10-CM

## 2022-07-26 DIAGNOSIS — E86.0 DEHYDRATION: ICD-10-CM

## 2022-07-26 DIAGNOSIS — S22.000S THORACIC COMPRESSION FRACTURE, SEQUELA: ICD-10-CM

## 2022-07-26 DIAGNOSIS — Z79.4 DIABETES MELLITUS DUE TO UNDERLYING CONDITION WITH HYPERGLYCEMIA, WITH LONG-TERM CURRENT USE OF INSULIN (HCC): ICD-10-CM

## 2022-07-26 DIAGNOSIS — R11.2 NAUSEA AND VOMITING, INTRACTABILITY OF VOMITING NOT SPECIFIED, UNSPECIFIED VOMITING TYPE: ICD-10-CM

## 2022-07-26 DIAGNOSIS — R10.84 GENERALIZED ABDOMINAL PAIN: ICD-10-CM

## 2022-07-26 LAB
ALBUMIN SERPL BCP-MCNC: 4 G/DL (ref 3.2–4.9)
ALBUMIN/GLOB SERPL: 1.3 G/DL
ALP SERPL-CCNC: 95 U/L (ref 30–99)
ALT SERPL-CCNC: 13 U/L (ref 2–50)
ANION GAP SERPL CALC-SCNC: 23 MMOL/L (ref 7–16)
AST SERPL-CCNC: 14 U/L (ref 12–45)
B-OH-BUTYR SERPL-MCNC: >8 MMOL/L (ref 0.02–0.27)
BASOPHILS # BLD AUTO: 0.1 % (ref 0–1.8)
BASOPHILS # BLD: 0.01 K/UL (ref 0–0.12)
BILIRUB SERPL-MCNC: 0.7 MG/DL (ref 0.1–1.5)
BUN SERPL-MCNC: 14 MG/DL (ref 8–22)
CALCIUM SERPL-MCNC: 8.9 MG/DL (ref 8.5–10.5)
CHLORIDE SERPL-SCNC: 86 MMOL/L (ref 96–112)
CO2 SERPL-SCNC: 22 MMOL/L (ref 20–33)
CREAT SERPL-MCNC: 0.67 MG/DL (ref 0.5–1.4)
EOSINOPHIL # BLD AUTO: 0.01 K/UL (ref 0–0.51)
EOSINOPHIL NFR BLD: 0.1 % (ref 0–6.9)
ERYTHROCYTE [DISTWIDTH] IN BLOOD BY AUTOMATED COUNT: 36.6 FL (ref 35.9–50)
GFR SERPLBLD CREATININE-BSD FMLA CKD-EPI: 108 ML/MIN/1.73 M 2
GLOBULIN SER CALC-MCNC: 3.1 G/DL (ref 1.9–3.5)
GLUCOSE BLD STRIP.AUTO-MCNC: 282 MG/DL (ref 65–99)
GLUCOSE BLD STRIP.AUTO-MCNC: 337 MG/DL (ref 65–99)
GLUCOSE SERPL-MCNC: 335 MG/DL (ref 65–99)
HCT VFR BLD AUTO: 43.5 % (ref 42–52)
HGB BLD-MCNC: 14.9 G/DL (ref 14–18)
IMM GRANULOCYTES # BLD AUTO: 0.03 K/UL (ref 0–0.11)
IMM GRANULOCYTES NFR BLD AUTO: 0.3 % (ref 0–0.9)
LIPASE SERPL-CCNC: 9 U/L (ref 11–82)
LYMPHOCYTES # BLD AUTO: 0.86 K/UL (ref 1–4.8)
LYMPHOCYTES NFR BLD: 8.2 % (ref 22–41)
MCH RBC QN AUTO: 27.5 PG (ref 27–33)
MCHC RBC AUTO-ENTMCNC: 34.3 G/DL (ref 33.7–35.3)
MCV RBC AUTO: 80.3 FL (ref 81.4–97.8)
MONOCYTES # BLD AUTO: 0.42 K/UL (ref 0–0.85)
MONOCYTES NFR BLD AUTO: 4 % (ref 0–13.4)
NEUTROPHILS # BLD AUTO: 9.16 K/UL (ref 1.82–7.42)
NEUTROPHILS NFR BLD: 87.3 % (ref 44–72)
NRBC # BLD AUTO: 0 K/UL
NRBC BLD-RTO: 0 /100 WBC
PLATELET # BLD AUTO: 290 K/UL (ref 164–446)
PMV BLD AUTO: 8.7 FL (ref 9–12.9)
POTASSIUM SERPL-SCNC: 4 MMOL/L (ref 3.6–5.5)
PROT SERPL-MCNC: 7.1 G/DL (ref 6–8.2)
RBC # BLD AUTO: 5.42 M/UL (ref 4.7–6.1)
SODIUM SERPL-SCNC: 131 MMOL/L (ref 135–145)
WBC # BLD AUTO: 10.5 K/UL (ref 4.8–10.8)

## 2022-07-26 PROCEDURE — 85025 COMPLETE CBC W/AUTO DIFF WBC: CPT

## 2022-07-26 PROCEDURE — 700105 HCHG RX REV CODE 258: Performed by: EMERGENCY MEDICINE

## 2022-07-26 PROCEDURE — 99285 EMERGENCY DEPT VISIT HI MDM: CPT

## 2022-07-26 PROCEDURE — 83690 ASSAY OF LIPASE: CPT

## 2022-07-26 PROCEDURE — 82962 GLUCOSE BLOOD TEST: CPT

## 2022-07-26 PROCEDURE — 96372 THER/PROPH/DIAG INJ SC/IM: CPT

## 2022-07-26 PROCEDURE — 700102 HCHG RX REV CODE 250 W/ 637 OVERRIDE(OP): Performed by: EMERGENCY MEDICINE

## 2022-07-26 PROCEDURE — 96374 THER/PROPH/DIAG INJ IV PUSH: CPT

## 2022-07-26 PROCEDURE — 700101 HCHG RX REV CODE 250: Performed by: EMERGENCY MEDICINE

## 2022-07-26 PROCEDURE — A9270 NON-COVERED ITEM OR SERVICE: HCPCS | Performed by: EMERGENCY MEDICINE

## 2022-07-26 PROCEDURE — 96375 TX/PRO/DX INJ NEW DRUG ADDON: CPT

## 2022-07-26 PROCEDURE — 80053 COMPREHEN METABOLIC PANEL: CPT

## 2022-07-26 PROCEDURE — 82010 KETONE BODYS QUAN: CPT

## 2022-07-26 PROCEDURE — 700111 HCHG RX REV CODE 636 W/ 250 OVERRIDE (IP): Performed by: EMERGENCY MEDICINE

## 2022-07-26 PROCEDURE — 74018 RADEX ABDOMEN 1 VIEW: CPT

## 2022-07-26 PROCEDURE — 36415 COLL VENOUS BLD VENIPUNCTURE: CPT

## 2022-07-26 RX ORDER — BISACODYL 5 MG
10 TABLET, DELAYED RELEASE (ENTERIC COATED) ORAL ONCE
Status: COMPLETED | OUTPATIENT
Start: 2022-07-26 | End: 2022-07-26

## 2022-07-26 RX ORDER — ENEMA 19; 7 G/133ML; G/133ML
1 ENEMA RECTAL ONCE
Status: COMPLETED | OUTPATIENT
Start: 2022-07-26 | End: 2022-07-26

## 2022-07-26 RX ORDER — ONDANSETRON 2 MG/ML
4 INJECTION INTRAMUSCULAR; INTRAVENOUS ONCE
Status: COMPLETED | OUTPATIENT
Start: 2022-07-26 | End: 2022-07-26

## 2022-07-26 RX ORDER — SODIUM CHLORIDE 9 MG/ML
1000 INJECTION, SOLUTION INTRAVENOUS ONCE
Status: COMPLETED | OUTPATIENT
Start: 2022-07-26 | End: 2022-07-26

## 2022-07-26 RX ORDER — MORPHINE SULFATE 4 MG/ML
4 INJECTION INTRAVENOUS ONCE
Status: COMPLETED | OUTPATIENT
Start: 2022-07-26 | End: 2022-07-26

## 2022-07-26 RX ORDER — SODIUM CHLORIDE 9 MG/ML
INJECTION, SOLUTION INTRAVENOUS CONTINUOUS
Status: DISCONTINUED | OUTPATIENT
Start: 2022-07-26 | End: 2022-07-27 | Stop reason: HOSPADM

## 2022-07-26 RX ORDER — KETOROLAC TROMETHAMINE 30 MG/ML
30 INJECTION, SOLUTION INTRAMUSCULAR; INTRAVENOUS ONCE
Status: COMPLETED | OUTPATIENT
Start: 2022-07-26 | End: 2022-07-26

## 2022-07-26 RX ADMIN — INSULIN HUMAN 8 UNITS: 100 INJECTION, SOLUTION PARENTERAL at 23:03

## 2022-07-26 RX ADMIN — ONDANSETRON 4 MG: 2 INJECTION INTRAMUSCULAR; INTRAVENOUS at 20:16

## 2022-07-26 RX ADMIN — SODIUM CHLORIDE 1000 ML: 9 INJECTION, SOLUTION INTRAVENOUS at 20:18

## 2022-07-26 RX ADMIN — BISACODYL 10 MG: 5 TABLET, COATED ORAL at 21:58

## 2022-07-26 RX ADMIN — INSULIN HUMAN 10 UNITS: 100 INJECTION, SOLUTION PARENTERAL at 20:58

## 2022-07-26 RX ADMIN — SODIUM CHLORIDE: 9 INJECTION, SOLUTION INTRAVENOUS at 20:17

## 2022-07-26 RX ADMIN — MORPHINE SULFATE 4 MG: 4 INJECTION INTRAVENOUS at 20:55

## 2022-07-26 RX ADMIN — SODIUM PHOSPHATE 133 ML: 7; 19 ENEMA RECTAL at 21:57

## 2022-07-26 RX ADMIN — KETOROLAC TROMETHAMINE 30 MG: 30 INJECTION, SOLUTION INTRAMUSCULAR at 21:56

## 2022-07-26 RX ADMIN — MAGNESIUM CITRATE 296 ML: 1.75 LIQUID ORAL at 21:57

## 2022-07-26 ASSESSMENT — FIBROSIS 4 INDEX: FIB4 SCORE: 0.69

## 2022-07-26 NOTE — ED TRIAGE NOTES
"Chief Complaint   Patient presents with   • Abdominal Pain     ~5 days of lower abd pain.   • Back Pain     1 week of pain \"in the center of my back.\" States \"I have a cracked vertabra from a fall.\"   • Constipation     No BM for a week.     ED Triage Vitals   Enc Vitals Group      Blood Pressure 07/26/22 1334 139/79      Pulse 07/26/22 1334 94      Respiration 07/26/22 1334 (!) 22      Temperature 07/26/22 1334 36.1 °C (96.9 °F)      Temp src 07/26/22 1334 Temporal      Pulse Oximetry 07/26/22 1334 97 %      Weight 07/26/22 1409 72 kg (158 lb 11.7 oz)      Height 07/26/22 1409 1.829 m (6')     Also reports occasional vomiting.  "

## 2022-07-27 LAB — GLUCOSE BLD STRIP.AUTO-MCNC: 285 MG/DL (ref 65–99)

## 2022-07-27 PROCEDURE — 82962 GLUCOSE BLOOD TEST: CPT

## 2022-07-27 NOTE — ED NOTES
Pt was DC will not leave the room until he finds his car keys. Pt came in with EMS. Room was searched and no keys were found. Security is at bedside after pt looks in the trash security will take pt out to the waiting room.

## 2022-07-27 NOTE — ED PROVIDER NOTES
"ED Provider Note     Scribed for Cleopatra Andrade D.O. by Jarrod House. 7/26/2022, 8:11 PM.     Primary care provider: Pcp Pt States None  Means of arrival: EMS         History obtained from: Patient  History limited by: None    CHIEF COMPLAINT  Chief Complaint   Patient presents with   • Abdominal Pain     ~5 days of lower abd pain.   • Back Pain     1 week of pain \"in the center of my back.\" States \"I have a cracked vertabra from a fall.\"   • Constipation     No BM for a week.     HPI  Christoph Taylor is a 57 y.o. male who presents to the emergency Department via EMS for acute, mild back pain onset one week ago. Per patient, he was diagnosed with a vertebrae compression last week after a fall. He has additionally been experiencing abdominal pain for the past five days with associated vomiting and says he is unable to keep any fluids or food down. He has associated symptoms of constipation, but denies fever. Movement exacerbates his pain.    REVIEW OF SYSTEMS  Pertinent positives include back pain, abdominal pain, vomiting, and constipation. Pertinent negatives include no fever.   See HPI for further details. All other systems are negative.    PAST MEDICAL HISTORY  Past Medical History:   Diagnosis Date   • Diabetes (HCC)    • Hypercholesteremia        FAMILY HISTORY  Family History   Problem Relation Age of Onset   • Heart Disease Father        SOCIAL HISTORY  Social History     Tobacco Use   • Smoking status: Former Smoker   • Smokeless tobacco: Never Used   Substance Use Topics   • Alcohol use: Not Currently   • Drug use: Not Currently      Social History     Substance and Sexual Activity   Drug Use Not Currently       SURGICAL HISTORY  Past Surgical History:   Procedure Laterality Date   • OTHER      appy, lumbar fusion       CURRENT MEDICATIONS  No current facility-administered medications for this encounter.    Current Outpatient Medications:   •  Insulin NPH Isophane & Regular (NOVOLIN 70/30 FLEXPEN SC), Inject " 28 Units under the skin 2 times a day., Disp: , Rfl:   •  aspirin (ASA) 81 MG Chew Tab chewable tablet, Chew 1 Tablet every day. (Patient not taking: Reported on 7/26/2022), Disp: 100 Tablet, Rfl: 0    ALLERGIES  No Known Allergies    PHYSICAL EXAM  VITAL SIGNS: /83   Pulse 96   Temp 36.2 °C (97.1 °F) (Oral)   Resp 18   Ht 1.829 m (6')   Wt 72 kg (158 lb 11.7 oz)   SpO2 97%   BMI 21.53 kg/m²     Constitutional: Patient is an ill-appearing male. Moderate distress. Actively vomiting. Disheveled.  HENT: Normocephalic, atraumatic.  Dry oral mucosa.  Neck: Supple   Cardiovascular: Normal heart rate and Regular rhythm. No murmur  Thorax & Lungs: Clear and equal breath sounds with good excursion. No respiratory distress, no rhonchi, wheezing or rales.  Abdomen: Bowel sounds normal in all four quadrants. Very thin, Soft, Left upper quadrant tenderness on palpation, No flank tenderness, Actively vomiting, no rebound , guarding, palpable masses.   Skin: Pale, Cool, Dry  Back: No cervical, thoracic, or lumbosacral tenderness. No CVA tenderness.   Extremities: Peripheral pulses 4/4 No edema, No tenderness  Musculoskeletal: Normal range of motion in all major joints. No tenderness to palpation or major deformities noted.   Neurologic: Alert & oriented x 3, Normal motor function, Normal sensory function.  Psychiatric: Affect very odd.    DIAGNOSTICS/PROCEDURES    LABS  Results for orders placed or performed during the hospital encounter of 07/26/22   CBC WITH DIFFERENTIAL   Result Value Ref Range    WBC 10.5 4.8 - 10.8 K/uL    RBC 5.42 4.70 - 6.10 M/uL    Hemoglobin 14.9 14.0 - 18.0 g/dL    Hematocrit 43.5 42.0 - 52.0 %    MCV 80.3 (L) 81.4 - 97.8 fL    MCH 27.5 27.0 - 33.0 pg    MCHC 34.3 33.7 - 35.3 g/dL    RDW 36.6 35.9 - 50.0 fL    Platelet Count 290 164 - 446 K/uL    MPV 8.7 (L) 9.0 - 12.9 fL    Neutrophils-Polys 87.30 (H) 44.00 - 72.00 %    Lymphocytes 8.20 (L) 22.00 - 41.00 %    Monocytes 4.00 0.00 - 13.40 %     Eosinophils 0.10 0.00 - 6.90 %    Basophils 0.10 0.00 - 1.80 %    Immature Granulocytes 0.30 0.00 - 0.90 %    Nucleated RBC 0.00 /100 WBC    Neutrophils (Absolute) 9.16 (H) 1.82 - 7.42 K/uL    Lymphs (Absolute) 0.86 (L) 1.00 - 4.80 K/uL    Monos (Absolute) 0.42 0.00 - 0.85 K/uL    Eos (Absolute) 0.01 0.00 - 0.51 K/uL    Baso (Absolute) 0.01 0.00 - 0.12 K/uL    Immature Granulocytes (abs) 0.03 0.00 - 0.11 K/uL    NRBC (Absolute) 0.00 K/uL   COMP METABOLIC PANEL   Result Value Ref Range    Sodium 131 (L) 135 - 145 mmol/L    Potassium 4.0 3.6 - 5.5 mmol/L    Chloride 86 (L) 96 - 112 mmol/L    Co2 22 20 - 33 mmol/L    Anion Gap 23.0 (H) 7.0 - 16.0    Glucose 335 (H) 65 - 99 mg/dL    Bun 14 8 - 22 mg/dL    Creatinine 0.67 0.50 - 1.40 mg/dL    Calcium 8.9 8.5 - 10.5 mg/dL    AST(SGOT) 14 12 - 45 U/L    ALT(SGPT) 13 2 - 50 U/L    Alkaline Phosphatase 95 30 - 99 U/L    Total Bilirubin 0.7 0.1 - 1.5 mg/dL    Albumin 4.0 3.2 - 4.9 g/dL    Total Protein 7.1 6.0 - 8.2 g/dL    Globulin 3.1 1.9 - 3.5 g/dL    A-G Ratio 1.3 g/dL   LIPASE   Result Value Ref Range    Lipase 9 (L) 11 - 82 U/L   ESTIMATED GFR   Result Value Ref Range    GFR (CKD-EPI) 108 >60 mL/min/1.73 m 2   BETA-HYDROXYBUTYRIC ACID   Result Value Ref Range    beta-Hydroxybutyric Acid >8.00 (H) 0.02 - 0.27 mmol/L   POCT glucose device results   Result Value Ref Range    POC Glucose, Blood 337 (H) 65 - 99 mg/dL     Labs reviewed by me    RADIOLOGY/PROCEDURES  [id number] VIEW   Final Result         1.  Moderate stool in the colon suggests changes of constipation, otherwise nonspecific bowel gas pattern   2.  Mild levoscoliosis        Results and radiologist interpretation reviewed by me.     COURSE & MEDICAL DECISION MAKING  Pertinent Labs & Imaging studies reviewed. (See chart for details)    8:11 PM - Patient seen and evaluated at bedside. Discussed plan of care, including labs and radiology. Patient agrees to plan of care. Ordered for DX-Abdomen,  Beta-Hydroxybutyric Acid, CBC w/ Diff, CMP, Lipase, and UA to evaluate. Patient will be treated with Zofran 4 mg injection, and IV Fluids for his symptoms. Differential diagnoses include, but are not limited to, DKA vs. Ileus vs. Dehydration    Patient's labs reveal a white count normal at 10.1 with a stable H&H.  Sodium is 131, beta hydroxybutyric acid was greater than 8, glucose was 335, anion gap is 23 chloride is 86.  Patient received IV fluids with 1 L of normal saline along with some insulin.  He became irritated and pulled his own IV out stating that he did not need it any longer.  I have given him 2 doses of insulin and his sugar upon recheck is 280.  He has had no more vomiting since he has been here.  He was treated for the constipation that was noted on his x-ray showing moderate stool in the colon.    9:25 PM - Patient will be treated with magnesium citrate 296 mL, Dulcolax 10 mg PO, enema, and Toradol 30 mg injection for his constipation.    10:52 PM - Per RN, the patient was able to have a bowel movement. Patient will be discharged.    HYDRATION: Based on the patient's presentation of Acute Vomiting and Dehydration the patient was given IV fluids. IV Hydration was used because oral hydration was not adequate alone. Upon recheck following hydration, the patient was improved.  Patient was able to eat a sandwich has had no more vomiting has had a good bowel movement and would like to be discharged.  He was discharged in stable and improved condition.    The patient will return for new or worsening symptoms and is stable at the time of discharge.    DISPOSITION:  Patient will be discharged home in stable condition.    FOLLOW UP:  [facility] [place]  [address] [facility] [address]  [phone]  Schedule an appointment as soon as possible for a visit in 2 days  As needed, If symptoms worsen    FINAL IMPRESSION  1. Dehydration    2. Nausea and vomiting, intractability of vomiting not specified,  unspecified vomiting type    3. Diabetes mellitus due to underlying condition with hyperglycemia, with long-term current use of insulin (HCC)    4. Acute midline thoracic back pain    5. Thoracic compression fracture, sequela    6. Constipation, unspecified constipation type    7. Generalized abdominal pain    8.  Mild DKA    Jarrod SPEAR (Scribe), am scribing for, and in the presence of, Cleopatra Andrade D.O..    Electronically signed by: Jarrod House (Scribe), 7/26/2022    Cleopatra SPEAR D.O. personally performed the services described in this documentation, as scribed by Jarrod House in my presence, and it is both accurate and complete.    The note accurately reflects work and decisions made by me.  Cleopatra Andrade D.O.  7/27/2022  3:04 AM

## 2022-07-27 NOTE — DISCHARGE INSTRUCTIONS
Follow-up with your primary care provider or [facility] clinic for reevaluation of all of your medical problems.  Make sure that you stay well-hydrated drinking at least a gallon of water per day for your diabetes as well as your constipation.  Eat foods high in fiber, look them up on the Internet and correlate with your normal dietary behavior.  You may need to take laxatives for the next 1 to 2 days to completely clean out as you are extremely constipated likely due to narcotic pain medication.  Apply moist heat to your back or apply Biofreeze topically.  Make sure that you are taking your diabetic medications to avoid going into diabetic ketoacidosis.

## 2022-07-28 ENCOUNTER — HOSPITAL ENCOUNTER (INPATIENT)
Facility: MEDICAL CENTER | Age: 57
LOS: 3 days | DRG: 638 | End: 2022-07-31
Attending: EMERGENCY MEDICINE | Admitting: INTERNAL MEDICINE
Payer: COMMERCIAL

## 2022-07-28 ENCOUNTER — APPOINTMENT (OUTPATIENT)
Dept: RADIOLOGY | Facility: MEDICAL CENTER | Age: 57
DRG: 638 | End: 2022-07-28
Attending: EMERGENCY MEDICINE
Payer: COMMERCIAL

## 2022-07-28 DIAGNOSIS — K20.90 ESOPHAGITIS: ICD-10-CM

## 2022-07-28 DIAGNOSIS — E10.10 DKA, TYPE 1, NOT AT GOAL (HCC): ICD-10-CM

## 2022-07-28 DIAGNOSIS — K59.00 CONSTIPATION, UNSPECIFIED CONSTIPATION TYPE: ICD-10-CM

## 2022-07-28 DIAGNOSIS — E11.10 DIABETIC KETOACIDOSIS WITHOUT COMA ASSOCIATED WITH TYPE 2 DIABETES MELLITUS (HCC): ICD-10-CM

## 2022-07-28 DIAGNOSIS — R11.2 NON-INTRACTABLE VOMITING WITH NAUSEA, UNSPECIFIED VOMITING TYPE: ICD-10-CM

## 2022-07-28 DIAGNOSIS — K92.0 HEMATEMESIS WITH NAUSEA: ICD-10-CM

## 2022-07-28 DIAGNOSIS — E86.0 DEHYDRATION: ICD-10-CM

## 2022-07-28 PROBLEM — K92.1 COMPLAINT OF MELENA: Status: ACTIVE | Noted: 2022-07-28

## 2022-07-28 PROBLEM — E87.1 HYPONATREMIA: Status: ACTIVE | Noted: 2022-07-28

## 2022-07-28 PROBLEM — I95.89 HYPOTENSION DUE TO HYPOVOLEMIA: Status: ACTIVE | Noted: 2022-07-28

## 2022-07-28 PROBLEM — E86.1 HYPOTENSION DUE TO HYPOVOLEMIA: Status: ACTIVE | Noted: 2022-07-28

## 2022-07-28 LAB
ALBUMIN SERPL BCP-MCNC: 3.1 G/DL (ref 3.2–4.9)
ALBUMIN SERPL BCP-MCNC: 3.6 G/DL (ref 3.2–4.9)
ALBUMIN/GLOB SERPL: 1.2 G/DL
ALBUMIN/GLOB SERPL: 1.3 G/DL
ALP SERPL-CCNC: 86 U/L (ref 30–99)
ALP SERPL-CCNC: 96 U/L (ref 30–99)
ALT SERPL-CCNC: 10 U/L (ref 2–50)
ALT SERPL-CCNC: 9 U/L (ref 2–50)
AMPHET UR QL SCN: NEGATIVE
ANION GAP SERPL CALC-SCNC: 16 MMOL/L (ref 7–16)
ANION GAP SERPL CALC-SCNC: 23 MMOL/L (ref 7–16)
ANION GAP SERPL CALC-SCNC: 30 MMOL/L (ref 7–16)
APPEARANCE UR: CLEAR
AST SERPL-CCNC: 7 U/L (ref 12–45)
AST SERPL-CCNC: 9 U/L (ref 12–45)
B-OH-BUTYR SERPL-MCNC: >8 MMOL/L (ref 0.02–0.27)
BARBITURATES UR QL SCN: NEGATIVE
BASE EXCESS BLDA CALC-SCNC: 0 MMOL/L (ref -4–3)
BASOPHILS # BLD AUTO: 0.1 % (ref 0–1.8)
BASOPHILS # BLD AUTO: 0.1 % (ref 0–1.8)
BASOPHILS # BLD: 0.01 K/UL (ref 0–0.12)
BASOPHILS # BLD: 0.02 K/UL (ref 0–0.12)
BENZODIAZ UR QL SCN: NEGATIVE
BILIRUB SERPL-MCNC: 0.5 MG/DL (ref 0.1–1.5)
BILIRUB SERPL-MCNC: 0.6 MG/DL (ref 0.1–1.5)
BILIRUB UR QL STRIP.AUTO: ABNORMAL
BODY TEMPERATURE: ABNORMAL DEGREES
BUN SERPL-MCNC: 13 MG/DL (ref 8–22)
BUN SERPL-MCNC: 19 MG/DL (ref 8–22)
BUN SERPL-MCNC: 19 MG/DL (ref 8–22)
BZE UR QL SCN: NEGATIVE
CALCIUM SERPL-MCNC: 6.6 MG/DL (ref 8.4–10.2)
CALCIUM SERPL-MCNC: 7.9 MG/DL (ref 8.4–10.2)
CALCIUM SERPL-MCNC: 8.4 MG/DL (ref 8.4–10.2)
CANNABINOIDS UR QL SCN: NEGATIVE
CHLORIDE SERPL-SCNC: 103 MMOL/L (ref 96–112)
CHLORIDE SERPL-SCNC: 87 MMOL/L (ref 96–112)
CHLORIDE SERPL-SCNC: 95 MMOL/L (ref 96–112)
CO2 BLDA-SCNC: 26 MMOL/L (ref 20–33)
CO2 SERPL-SCNC: 17 MMOL/L (ref 20–33)
CO2 SERPL-SCNC: 18 MMOL/L (ref 20–33)
CO2 SERPL-SCNC: 18 MMOL/L (ref 20–33)
COLOR UR: YELLOW
CREAT SERPL-MCNC: 0.54 MG/DL (ref 0.5–1.4)
CREAT SERPL-MCNC: 0.92 MG/DL (ref 0.5–1.4)
CREAT SERPL-MCNC: 0.94 MG/DL (ref 0.5–1.4)
DELSYS IDSYS: ABNORMAL
EKG IMPRESSION: NORMAL
EOSINOPHIL # BLD AUTO: 0 K/UL (ref 0–0.51)
EOSINOPHIL # BLD AUTO: 0.01 K/UL (ref 0–0.51)
EOSINOPHIL NFR BLD: 0 % (ref 0–6.9)
EOSINOPHIL NFR BLD: 0.1 % (ref 0–6.9)
ERYTHROCYTE [DISTWIDTH] IN BLOOD BY AUTOMATED COUNT: 38.1 FL (ref 35.9–50)
ERYTHROCYTE [DISTWIDTH] IN BLOOD BY AUTOMATED COUNT: 38.5 FL (ref 35.9–50)
ERYTHROCYTE [DISTWIDTH] IN BLOOD BY AUTOMATED COUNT: 39.6 FL (ref 35.9–50)
EST. AVERAGE GLUCOSE BLD GHB EST-MCNC: 346 MG/DL
GFR SERPLBLD CREATININE-BSD FMLA CKD-EPI: 115 ML/MIN/1.73 M 2
GFR SERPLBLD CREATININE-BSD FMLA CKD-EPI: 94 ML/MIN/1.73 M 2
GFR SERPLBLD CREATININE-BSD FMLA CKD-EPI: 96 ML/MIN/1.73 M 2
GLOBULIN SER CALC-MCNC: 2.5 G/DL (ref 1.9–3.5)
GLOBULIN SER CALC-MCNC: 2.7 G/DL (ref 1.9–3.5)
GLUCOSE BLD STRIP.AUTO-MCNC: 132 MG/DL (ref 65–99)
GLUCOSE BLD STRIP.AUTO-MCNC: 201 MG/DL (ref 65–99)
GLUCOSE BLD STRIP.AUTO-MCNC: 263 MG/DL (ref 65–99)
GLUCOSE BLD STRIP.AUTO-MCNC: 289 MG/DL (ref 65–99)
GLUCOSE BLD STRIP.AUTO-MCNC: 295 MG/DL (ref 65–99)
GLUCOSE BLD STRIP.AUTO-MCNC: 355 MG/DL (ref 65–99)
GLUCOSE BLD STRIP.AUTO-MCNC: 357 MG/DL (ref 65–99)
GLUCOSE SERPL-MCNC: 140 MG/DL (ref 65–99)
GLUCOSE SERPL-MCNC: 354 MG/DL (ref 65–99)
GLUCOSE SERPL-MCNC: 393 MG/DL (ref 65–99)
GLUCOSE UR STRIP.AUTO-MCNC: 500 MG/DL
HBA1C MFR BLD: 13.7 % (ref 4–5.6)
HCO3 BLDA-SCNC: 24.9 MMOL/L (ref 17–25)
HCT VFR BLD AUTO: 39.8 % (ref 42–52)
HCT VFR BLD AUTO: 40.8 % (ref 42–52)
HCT VFR BLD AUTO: 44.5 % (ref 42–52)
HGB BLD-MCNC: 13.3 G/DL (ref 14–18)
HGB BLD-MCNC: 13.8 G/DL (ref 14–18)
HGB BLD-MCNC: 15.2 G/DL (ref 14–18)
IMM GRANULOCYTES # BLD AUTO: 0.09 K/UL (ref 0–0.11)
IMM GRANULOCYTES # BLD AUTO: 0.1 K/UL (ref 0–0.11)
IMM GRANULOCYTES NFR BLD AUTO: 0.7 % (ref 0–0.9)
IMM GRANULOCYTES NFR BLD AUTO: 0.8 % (ref 0–0.9)
KETONES UR STRIP.AUTO-MCNC: >=80 MG/DL
LACTATE BLD-SCNC: 1 MMOL/L (ref 0.5–2)
LACTATE BLD-SCNC: 2 MMOL/L (ref 0.5–2)
LACTATE BLD-SCNC: 2.1 MMOL/L (ref 0.5–2)
LEUKOCYTE ESTERASE UR QL STRIP.AUTO: NEGATIVE
LIPASE SERPL-CCNC: 8 U/L (ref 7–58)
LPM ILPM: 2 LPM
LYMPHOCYTES # BLD AUTO: 0.78 K/UL (ref 1–4.8)
LYMPHOCYTES # BLD AUTO: 0.92 K/UL (ref 1–4.8)
LYMPHOCYTES NFR BLD: 6.6 % (ref 22–41)
LYMPHOCYTES NFR BLD: 6.8 % (ref 22–41)
MAGNESIUM SERPL-MCNC: 1.9 MG/DL (ref 1.5–2.5)
MAGNESIUM SERPL-MCNC: 2 MG/DL (ref 1.5–2.5)
MCH RBC QN AUTO: 27.6 PG (ref 27–33)
MCH RBC QN AUTO: 27.8 PG (ref 27–33)
MCH RBC QN AUTO: 28 PG (ref 27–33)
MCHC RBC AUTO-ENTMCNC: 33.4 G/DL (ref 33.7–35.3)
MCHC RBC AUTO-ENTMCNC: 33.8 G/DL (ref 33.7–35.3)
MCHC RBC AUTO-ENTMCNC: 34.2 G/DL (ref 33.7–35.3)
MCV RBC AUTO: 82 FL (ref 81.4–97.8)
MCV RBC AUTO: 82.3 FL (ref 81.4–97.8)
MCV RBC AUTO: 82.6 FL (ref 81.4–97.8)
METHADONE UR QL SCN: NEGATIVE
MICRO URNS: ABNORMAL
MONOCYTES # BLD AUTO: 0.71 K/UL (ref 0–0.85)
MONOCYTES # BLD AUTO: 0.74 K/UL (ref 0–0.85)
MONOCYTES NFR BLD AUTO: 5.5 % (ref 0–13.4)
MONOCYTES NFR BLD AUTO: 6 % (ref 0–13.4)
NEUTROPHILS # BLD AUTO: 10.23 K/UL (ref 1.82–7.42)
NEUTROPHILS # BLD AUTO: 11.75 K/UL (ref 1.82–7.42)
NEUTROPHILS NFR BLD: 86.5 % (ref 44–72)
NEUTROPHILS NFR BLD: 86.8 % (ref 44–72)
NITRITE UR QL STRIP.AUTO: NEGATIVE
NRBC # BLD AUTO: 0 K/UL
NRBC # BLD AUTO: 0 K/UL
NRBC BLD-RTO: 0 /100 WBC
NRBC BLD-RTO: 0 /100 WBC
OPIATES UR QL SCN: POSITIVE
OXYCODONE UR QL SCN: NEGATIVE
PCO2 BLDA: 41.1 MMHG (ref 26–37)
PCP UR QL SCN: NEGATIVE
PH BLDA: 7.39 [PH] (ref 7.4–7.5)
PH TEMP ADJ BLDA: 7.41 [PH] (ref 7.4–7.5)
PH UR STRIP.AUTO: 5 [PH] (ref 5–8)
PHOSPHATE SERPL-MCNC: 3.4 MG/DL (ref 2.5–4.5)
PLATELET # BLD AUTO: 260 K/UL (ref 164–446)
PLATELET # BLD AUTO: 297 K/UL (ref 164–446)
PLATELET # BLD AUTO: 304 K/UL (ref 164–446)
PMV BLD AUTO: 8.6 FL (ref 9–12.9)
PMV BLD AUTO: 8.8 FL (ref 9–12.9)
PMV BLD AUTO: 8.8 FL (ref 9–12.9)
PO2 BLDA: 97 MMHG (ref 64–87)
PO2 TEMP ADJ BLDA: 90 MMHG (ref 64–87)
POTASSIUM SERPL-SCNC: 3.4 MMOL/L (ref 3.6–5.5)
POTASSIUM SERPL-SCNC: 4 MMOL/L (ref 3.6–5.5)
POTASSIUM SERPL-SCNC: 4.4 MMOL/L (ref 3.6–5.5)
PROCALCITONIN SERPL-MCNC: 0.09 NG/ML
PROPOXYPH UR QL SCN: NEGATIVE
PROT SERPL-MCNC: 5.6 G/DL (ref 6–8.2)
PROT SERPL-MCNC: 6.3 G/DL (ref 6–8.2)
PROT UR QL STRIP: NEGATIVE MG/DL
RBC # BLD AUTO: 4.82 M/UL (ref 4.7–6.1)
RBC # BLD AUTO: 4.96 M/UL (ref 4.7–6.1)
RBC # BLD AUTO: 5.43 M/UL (ref 4.7–6.1)
RBC UR QL AUTO: NEGATIVE
SAO2 % BLDA: 97 % (ref 93–99)
SODIUM SERPL-SCNC: 134 MMOL/L (ref 135–145)
SODIUM SERPL-SCNC: 136 MMOL/L (ref 135–145)
SODIUM SERPL-SCNC: 137 MMOL/L (ref 135–145)
SP GR UR STRIP.AUTO: 1.02
SPECIMEN DRAWN FROM PATIENT: ABNORMAL
TROPONIN T SERPL-MCNC: 20 NG/L (ref 6–19)
WBC # BLD AUTO: 11.8 K/UL (ref 4.8–10.8)
WBC # BLD AUTO: 13.5 K/UL (ref 4.8–10.8)
WBC # BLD AUTO: 14.6 K/UL (ref 4.8–10.8)

## 2022-07-28 PROCEDURE — 700105 HCHG RX REV CODE 258: Performed by: INTERNAL MEDICINE

## 2022-07-28 PROCEDURE — 80048 BASIC METABOLIC PNL TOTAL CA: CPT

## 2022-07-28 PROCEDURE — 36415 COLL VENOUS BLD VENIPUNCTURE: CPT

## 2022-07-28 PROCEDURE — 96374 THER/PROPH/DIAG INJ IV PUSH: CPT

## 2022-07-28 PROCEDURE — 81003 URINALYSIS AUTO W/O SCOPE: CPT

## 2022-07-28 PROCEDURE — 700111 HCHG RX REV CODE 636 W/ 250 OVERRIDE (IP): Performed by: INTERNAL MEDICINE

## 2022-07-28 PROCEDURE — 84100 ASSAY OF PHOSPHORUS: CPT

## 2022-07-28 PROCEDURE — 82803 BLOOD GASES ANY COMBINATION: CPT

## 2022-07-28 PROCEDURE — 82010 KETONE BODYS QUAN: CPT

## 2022-07-28 PROCEDURE — 96372 THER/PROPH/DIAG INJ SC/IM: CPT

## 2022-07-28 PROCEDURE — 93005 ELECTROCARDIOGRAM TRACING: CPT | Performed by: EMERGENCY MEDICINE

## 2022-07-28 PROCEDURE — 85025 COMPLETE CBC W/AUTO DIFF WBC: CPT

## 2022-07-28 PROCEDURE — 99223 1ST HOSP IP/OBS HIGH 75: CPT | Performed by: INTERNAL MEDICINE

## 2022-07-28 PROCEDURE — 85027 COMPLETE CBC AUTOMATED: CPT

## 2022-07-28 PROCEDURE — 83036 HEMOGLOBIN GLYCOSYLATED A1C: CPT

## 2022-07-28 PROCEDURE — 84484 ASSAY OF TROPONIN QUANT: CPT

## 2022-07-28 PROCEDURE — 700102 HCHG RX REV CODE 250 W/ 637 OVERRIDE(OP): Performed by: INTERNAL MEDICINE

## 2022-07-28 PROCEDURE — 83735 ASSAY OF MAGNESIUM: CPT

## 2022-07-28 PROCEDURE — 700105 HCHG RX REV CODE 258

## 2022-07-28 PROCEDURE — A9270 NON-COVERED ITEM OR SERVICE: HCPCS | Performed by: INTERNAL MEDICINE

## 2022-07-28 PROCEDURE — 99291 CRITICAL CARE FIRST HOUR: CPT

## 2022-07-28 PROCEDURE — 36600 WITHDRAWAL OF ARTERIAL BLOOD: CPT

## 2022-07-28 PROCEDURE — 82962 GLUCOSE BLOOD TEST: CPT

## 2022-07-28 PROCEDURE — 74177 CT ABD & PELVIS W/CONTRAST: CPT

## 2022-07-28 PROCEDURE — 80307 DRUG TEST PRSMV CHEM ANLYZR: CPT

## 2022-07-28 PROCEDURE — 700111 HCHG RX REV CODE 636 W/ 250 OVERRIDE (IP): Performed by: EMERGENCY MEDICINE

## 2022-07-28 PROCEDURE — 700101 HCHG RX REV CODE 250

## 2022-07-28 PROCEDURE — 700117 HCHG RX CONTRAST REV CODE 255: Performed by: EMERGENCY MEDICINE

## 2022-07-28 PROCEDURE — 80053 COMPREHEN METABOLIC PANEL: CPT

## 2022-07-28 PROCEDURE — 84145 PROCALCITONIN (PCT): CPT

## 2022-07-28 PROCEDURE — 94760 N-INVAS EAR/PLS OXIMETRY 1: CPT

## 2022-07-28 PROCEDURE — 96375 TX/PRO/DX INJ NEW DRUG ADDON: CPT

## 2022-07-28 PROCEDURE — 770022 HCHG ROOM/CARE - ICU (200)

## 2022-07-28 PROCEDURE — C9113 INJ PANTOPRAZOLE SODIUM, VIA: HCPCS | Performed by: INTERNAL MEDICINE

## 2022-07-28 PROCEDURE — 99291 CRITICAL CARE FIRST HOUR: CPT | Performed by: INTERNAL MEDICINE

## 2022-07-28 PROCEDURE — 700105 HCHG RX REV CODE 258: Performed by: EMERGENCY MEDICINE

## 2022-07-28 PROCEDURE — 83690 ASSAY OF LIPASE: CPT

## 2022-07-28 PROCEDURE — 83605 ASSAY OF LACTIC ACID: CPT

## 2022-07-28 RX ORDER — DEXTROSE AND SODIUM CHLORIDE 10; .45 G/100ML; G/100ML
INJECTION, SOLUTION INTRAVENOUS CONTINUOUS
Status: DISCONTINUED | OUTPATIENT
Start: 2022-07-28 | End: 2022-07-29

## 2022-07-28 RX ORDER — SODIUM CHLORIDE, SODIUM LACTATE, POTASSIUM CHLORIDE, CALCIUM CHLORIDE 600; 310; 30; 20 MG/100ML; MG/100ML; MG/100ML; MG/100ML
1000 INJECTION, SOLUTION INTRAVENOUS ONCE
Status: COMPLETED | OUTPATIENT
Start: 2022-07-28 | End: 2022-07-28

## 2022-07-28 RX ORDER — DEXTROSE AND SODIUM CHLORIDE 5; .45 G/100ML; G/100ML
INJECTION, SOLUTION INTRAVENOUS CONTINUOUS
Status: DISCONTINUED | OUTPATIENT
Start: 2022-07-28 | End: 2022-07-29

## 2022-07-28 RX ORDER — SODIUM CHLORIDE 9 MG/ML
1000 INJECTION, SOLUTION INTRAVENOUS ONCE
Status: COMPLETED | OUTPATIENT
Start: 2022-07-28 | End: 2022-07-28

## 2022-07-28 RX ORDER — INSULIN LISPRO 100 [IU]/ML
1-6 INJECTION, SOLUTION INTRAVENOUS; SUBCUTANEOUS EVERY 6 HOURS
Status: DISCONTINUED | OUTPATIENT
Start: 2022-07-28 | End: 2022-07-28

## 2022-07-28 RX ORDER — MULTIVIT-MIN/FOLIC/VIT K/LYCOP 400-300MCG
1 TABLET ORAL DAILY
COMMUNITY
End: 2024-01-26

## 2022-07-28 RX ORDER — SODIUM CHLORIDE, SODIUM LACTATE, POTASSIUM CHLORIDE, AND CALCIUM CHLORIDE .6; .31; .03; .02 G/100ML; G/100ML; G/100ML; G/100ML
1000 INJECTION, SOLUTION INTRAVENOUS ONCE
Status: COMPLETED | OUTPATIENT
Start: 2022-07-28 | End: 2022-07-28

## 2022-07-28 RX ORDER — ENOXAPARIN SODIUM 100 MG/ML
40 INJECTION SUBCUTANEOUS DAILY
Status: DISCONTINUED | OUTPATIENT
Start: 2022-07-28 | End: 2022-07-30

## 2022-07-28 RX ORDER — MAGNESIUM SULFATE HEPTAHYDRATE 40 MG/ML
4 INJECTION, SOLUTION INTRAVENOUS
Status: COMPLETED | OUTPATIENT
Start: 2022-07-28 | End: 2022-07-29

## 2022-07-28 RX ORDER — NOREPINEPHRINE BITARTRATE 0.03 MG/ML
0-30 INJECTION, SOLUTION INTRAVENOUS CONTINUOUS
Status: DISCONTINUED | OUTPATIENT
Start: 2022-07-28 | End: 2022-07-29

## 2022-07-28 RX ORDER — POTASSIUM CHLORIDE 7.45 MG/ML
10 INJECTION INTRAVENOUS
Status: COMPLETED | OUTPATIENT
Start: 2022-07-28 | End: 2022-07-28

## 2022-07-28 RX ORDER — ONDANSETRON 2 MG/ML
4 INJECTION INTRAMUSCULAR; INTRAVENOUS EVERY 4 HOURS PRN
Status: DISCONTINUED | OUTPATIENT
Start: 2022-07-28 | End: 2022-07-31 | Stop reason: HOSPADM

## 2022-07-28 RX ORDER — SODIUM CHLORIDE, SODIUM LACTATE, POTASSIUM CHLORIDE, CALCIUM CHLORIDE 600; 310; 30; 20 MG/100ML; MG/100ML; MG/100ML; MG/100ML
INJECTION, SOLUTION INTRAVENOUS CONTINUOUS
Status: DISCONTINUED | OUTPATIENT
Start: 2022-07-28 | End: 2022-07-28

## 2022-07-28 RX ORDER — OXYCODONE HYDROCHLORIDE 5 MG/1
5 TABLET ORAL
Status: DISCONTINUED | OUTPATIENT
Start: 2022-07-28 | End: 2022-07-31 | Stop reason: HOSPADM

## 2022-07-28 RX ORDER — PROMETHAZINE HYDROCHLORIDE 25 MG/1
12.5-25 TABLET ORAL EVERY 4 HOURS PRN
Status: DISCONTINUED | OUTPATIENT
Start: 2022-07-28 | End: 2022-07-31 | Stop reason: HOSPADM

## 2022-07-28 RX ORDER — OXYCODONE HYDROCHLORIDE 10 MG/1
10 TABLET ORAL
Status: DISCONTINUED | OUTPATIENT
Start: 2022-07-28 | End: 2022-07-31 | Stop reason: HOSPADM

## 2022-07-28 RX ORDER — AMOXICILLIN 250 MG
2 CAPSULE ORAL 2 TIMES DAILY
Status: DISCONTINUED | OUTPATIENT
Start: 2022-07-28 | End: 2022-07-31 | Stop reason: HOSPADM

## 2022-07-28 RX ORDER — MAGNESIUM SULFATE HEPTAHYDRATE 40 MG/ML
2 INJECTION, SOLUTION INTRAVENOUS
Status: COMPLETED | OUTPATIENT
Start: 2022-07-28 | End: 2022-07-29

## 2022-07-28 RX ORDER — NOREPINEPHRINE BITARTRATE 0.03 MG/ML
INJECTION, SOLUTION INTRAVENOUS
Status: COMPLETED
Start: 2022-07-28 | End: 2022-07-28

## 2022-07-28 RX ORDER — HYDROMORPHONE HYDROCHLORIDE 1 MG/ML
0.5 INJECTION, SOLUTION INTRAMUSCULAR; INTRAVENOUS; SUBCUTANEOUS
Status: DISCONTINUED | OUTPATIENT
Start: 2022-07-28 | End: 2022-07-31 | Stop reason: HOSPADM

## 2022-07-28 RX ORDER — CALCIUM GLUCONATE 20 MG/ML
2 INJECTION, SOLUTION INTRAVENOUS ONCE
Status: COMPLETED | OUTPATIENT
Start: 2022-07-28 | End: 2022-07-28

## 2022-07-28 RX ORDER — ONDANSETRON 4 MG/1
4 TABLET, ORALLY DISINTEGRATING ORAL EVERY 4 HOURS PRN
Status: DISCONTINUED | OUTPATIENT
Start: 2022-07-28 | End: 2022-07-31 | Stop reason: HOSPADM

## 2022-07-28 RX ORDER — PROCHLORPERAZINE EDISYLATE 5 MG/ML
5-10 INJECTION INTRAMUSCULAR; INTRAVENOUS EVERY 4 HOURS PRN
Status: DISCONTINUED | OUTPATIENT
Start: 2022-07-28 | End: 2022-07-31 | Stop reason: HOSPADM

## 2022-07-28 RX ORDER — LABETALOL HYDROCHLORIDE 5 MG/ML
10 INJECTION, SOLUTION INTRAVENOUS EVERY 4 HOURS PRN
Status: DISCONTINUED | OUTPATIENT
Start: 2022-07-28 | End: 2022-07-31 | Stop reason: HOSPADM

## 2022-07-28 RX ORDER — SODIUM CHLORIDE, SODIUM LACTATE, POTASSIUM CHLORIDE, AND CALCIUM CHLORIDE .6; .31; .03; .02 G/100ML; G/100ML; G/100ML; G/100ML
2000 INJECTION, SOLUTION INTRAVENOUS ONCE
Status: DISCONTINUED | OUTPATIENT
Start: 2022-07-28 | End: 2022-07-28

## 2022-07-28 RX ORDER — PROMETHAZINE HYDROCHLORIDE 25 MG/1
12.5-25 SUPPOSITORY RECTAL EVERY 4 HOURS PRN
Status: DISCONTINUED | OUTPATIENT
Start: 2022-07-28 | End: 2022-07-31 | Stop reason: HOSPADM

## 2022-07-28 RX ORDER — PANTOPRAZOLE SODIUM 40 MG/10ML
40 INJECTION, POWDER, LYOPHILIZED, FOR SOLUTION INTRAVENOUS 2 TIMES DAILY
Status: DISCONTINUED | OUTPATIENT
Start: 2022-07-28 | End: 2022-07-31 | Stop reason: HOSPADM

## 2022-07-28 RX ORDER — MORPHINE SULFATE 4 MG/ML
4 INJECTION INTRAVENOUS ONCE
Status: COMPLETED | OUTPATIENT
Start: 2022-07-28 | End: 2022-07-28

## 2022-07-28 RX ORDER — PANTOPRAZOLE SODIUM 40 MG/10ML
40 INJECTION, POWDER, LYOPHILIZED, FOR SOLUTION INTRAVENOUS DAILY
Status: DISCONTINUED | OUTPATIENT
Start: 2022-07-28 | End: 2022-07-28

## 2022-07-28 RX ORDER — CALCIUM GLUCONATE 20 MG/ML
1 INJECTION, SOLUTION INTRAVENOUS ONCE
Status: COMPLETED | OUTPATIENT
Start: 2022-07-28 | End: 2022-07-28

## 2022-07-28 RX ORDER — SODIUM CHLORIDE 9 MG/ML
2000 INJECTION, SOLUTION INTRAVENOUS ONCE
Status: DISCONTINUED | OUTPATIENT
Start: 2022-07-28 | End: 2022-07-28

## 2022-07-28 RX ORDER — ACETAMINOPHEN 325 MG/1
650 TABLET ORAL EVERY 6 HOURS PRN
Status: DISCONTINUED | OUTPATIENT
Start: 2022-07-28 | End: 2022-07-31 | Stop reason: HOSPADM

## 2022-07-28 RX ORDER — IBUPROFEN 200 MG
400-800 TABLET ORAL EVERY 6 HOURS PRN
Status: ON HOLD | COMMUNITY
End: 2022-07-31

## 2022-07-28 RX ORDER — POLYETHYLENE GLYCOL 3350 17 G/17G
1 POWDER, FOR SOLUTION ORAL
Status: DISCONTINUED | OUTPATIENT
Start: 2022-07-28 | End: 2022-07-31 | Stop reason: HOSPADM

## 2022-07-28 RX ORDER — SODIUM CHLORIDE 450 MG/100ML
INJECTION, SOLUTION INTRAVENOUS CONTINUOUS
Status: DISCONTINUED | OUTPATIENT
Start: 2022-07-28 | End: 2022-07-29

## 2022-07-28 RX ORDER — BISACODYL 10 MG
10 SUPPOSITORY, RECTAL RECTAL
Status: DISCONTINUED | OUTPATIENT
Start: 2022-07-28 | End: 2022-07-31 | Stop reason: HOSPADM

## 2022-07-28 RX ORDER — DEXTROSE MONOHYDRATE 25 G/50ML
25 INJECTION, SOLUTION INTRAVENOUS
Status: DISCONTINUED | OUTPATIENT
Start: 2022-07-28 | End: 2022-07-28

## 2022-07-28 RX ADMIN — IOHEXOL 90 ML: 350 INJECTION, SOLUTION INTRAVENOUS at 07:52

## 2022-07-28 RX ADMIN — PANTOPRAZOLE SODIUM 40 MG: 40 INJECTION, POWDER, FOR SOLUTION INTRAVENOUS at 11:19

## 2022-07-28 RX ADMIN — POTASSIUM CHLORIDE 10 MEQ: 7.46 INJECTION, SOLUTION INTRAVENOUS at 16:43

## 2022-07-28 RX ADMIN — SODIUM CHLORIDE, POTASSIUM CHLORIDE, SODIUM LACTATE AND CALCIUM CHLORIDE 1000 ML: 600; 310; 30; 20 INJECTION, SOLUTION INTRAVENOUS at 08:42

## 2022-07-28 RX ADMIN — NOREPINEPHRINE BITARTRATE 5 MCG/MIN: 0.03 INJECTION, SOLUTION INTRAVENOUS at 18:21

## 2022-07-28 RX ADMIN — SODIUM CHLORIDE, POTASSIUM CHLORIDE, SODIUM LACTATE AND CALCIUM CHLORIDE: 600; 310; 30; 20 INJECTION, SOLUTION INTRAVENOUS at 12:31

## 2022-07-28 RX ADMIN — SODIUM CHLORIDE 1000 ML: 4.5 INJECTION, SOLUTION INTRAVENOUS at 15:00

## 2022-07-28 RX ADMIN — INSULIN GLARGINE-YFGN 16 UNITS: 100 INJECTION, SOLUTION SUBCUTANEOUS at 12:31

## 2022-07-28 RX ADMIN — CALCIUM GLUCONATE 2 G: 20 INJECTION, SOLUTION INTRAVENOUS at 20:32

## 2022-07-28 RX ADMIN — NOREPINEPHRINE BITARTRATE 5 MCG/MIN: 1 INJECTION, SOLUTION, CONCENTRATE INTRAVENOUS at 18:21

## 2022-07-28 RX ADMIN — MORPHINE SULFATE 4 MG: 4 INJECTION INTRAVENOUS at 07:30

## 2022-07-28 RX ADMIN — SODIUM CHLORIDE, POTASSIUM CHLORIDE, SODIUM LACTATE AND CALCIUM CHLORIDE 1000 ML: 600; 310; 30; 20 INJECTION, SOLUTION INTRAVENOUS at 07:16

## 2022-07-28 RX ADMIN — SODIUM CHLORIDE, POTASSIUM CHLORIDE, SODIUM LACTATE AND CALCIUM CHLORIDE 1000 ML: 600; 310; 30; 20 INJECTION, SOLUTION INTRAVENOUS at 18:33

## 2022-07-28 RX ADMIN — POTASSIUM CHLORIDE 10 MEQ: 7.46 INJECTION, SOLUTION INTRAVENOUS at 20:33

## 2022-07-28 RX ADMIN — SODIUM CHLORIDE 1000 ML: 9 INJECTION, SOLUTION INTRAVENOUS at 13:25

## 2022-07-28 RX ADMIN — SODIUM CHLORIDE, POTASSIUM CHLORIDE, SODIUM LACTATE AND CALCIUM CHLORIDE 1000 ML: 600; 310; 30; 20 INJECTION, SOLUTION INTRAVENOUS at 20:32

## 2022-07-28 RX ADMIN — SODIUM CHLORIDE 8 UNITS/HR: 9 INJECTION, SOLUTION INTRAVENOUS at 15:55

## 2022-07-28 RX ADMIN — POTASSIUM CHLORIDE 10 MEQ: 7.46 INJECTION, SOLUTION INTRAVENOUS at 19:36

## 2022-07-28 RX ADMIN — SENNOSIDES AND DOCUSATE SODIUM 2 TABLET: 50; 8.6 TABLET ORAL at 18:02

## 2022-07-28 RX ADMIN — DEXTROSE AND SODIUM CHLORIDE: 10; .45 INJECTION, SOLUTION INTRAVENOUS at 21:03

## 2022-07-28 RX ADMIN — DEXTROSE AND SODIUM CHLORIDE 1000 ML: 5; 450 INJECTION, SOLUTION INTRAVENOUS at 19:11

## 2022-07-28 RX ADMIN — ONDANSETRON 4 MG: 2 INJECTION INTRAMUSCULAR; INTRAVENOUS at 18:36

## 2022-07-28 RX ADMIN — POTASSIUM CHLORIDE 10 MEQ: 7.46 INJECTION, SOLUTION INTRAVENOUS at 21:34

## 2022-07-28 RX ADMIN — POTASSIUM CHLORIDE 10 MEQ: 7.46 INJECTION, SOLUTION INTRAVENOUS at 17:58

## 2022-07-28 RX ADMIN — OXYCODONE HYDROCHLORIDE 5 MG: 5 TABLET ORAL at 16:33

## 2022-07-28 RX ADMIN — FENTANYL CITRATE 50 MCG: 50 INJECTION, SOLUTION INTRAMUSCULAR; INTRAVENOUS at 08:42

## 2022-07-28 RX ADMIN — SODIUM CHLORIDE 1000 ML: 9 INJECTION, SOLUTION INTRAVENOUS at 14:29

## 2022-07-28 RX ADMIN — CALCIUM GLUCONATE 1 G: 20 INJECTION, SOLUTION INTRAVENOUS at 21:33

## 2022-07-28 RX ADMIN — PANTOPRAZOLE SODIUM 40 MG: 40 INJECTION, POWDER, FOR SOLUTION INTRAVENOUS at 20:32

## 2022-07-28 RX ADMIN — POTASSIUM CHLORIDE 10 MEQ: 7.46 INJECTION, SOLUTION INTRAVENOUS at 22:31

## 2022-07-28 ASSESSMENT — ENCOUNTER SYMPTOMS
EYE PAIN: 0
FALLS: 0
BACK PAIN: 1
CONSTIPATION: 1
NERVOUS/ANXIOUS: 0
COUGH: 0
SHORTNESS OF BREATH: 1
BLURRED VISION: 0
WEAKNESS: 1
PALPITATIONS: 0
CONSTIPATION: 0
INSOMNIA: 0
BACK PAIN: 0
VOMITING: 0
FEVER: 0
HEARTBURN: 1
SHORTNESS OF BREATH: 0
VOMITING: 1
HEADACHES: 0
NAUSEA: 1
DIARRHEA: 0
MYALGIAS: 1
DIZZINESS: 0
CHILLS: 0
ABDOMINAL PAIN: 1

## 2022-07-28 ASSESSMENT — LIFESTYLE VARIABLES
EVER FELT BAD OR GUILTY ABOUT YOUR DRINKING: NO
SUBSTANCE_ABUSE: 0
AVERAGE NUMBER OF DAYS PER WEEK YOU HAVE A DRINK CONTAINING ALCOHOL: 0
HAVE YOU EVER FELT YOU SHOULD CUT DOWN ON YOUR DRINKING: NO
EVER HAD A DRINK FIRST THING IN THE MORNING TO STEADY YOUR NERVES TO GET RID OF A HANGOVER: NO
HOW MANY TIMES IN THE PAST YEAR HAVE YOU HAD 5 OR MORE DRINKS IN A DAY: 0
HAVE PEOPLE ANNOYED YOU BY CRITICIZING YOUR DRINKING: NO
TOTAL SCORE: 0
CONSUMPTION TOTAL: NEGATIVE
TOTAL SCORE: 0
ON A TYPICAL DAY WHEN YOU DRINK ALCOHOL HOW MANY DRINKS DO YOU HAVE: 0
ALCOHOL_USE: NO
TOTAL SCORE: 0

## 2022-07-28 ASSESSMENT — COGNITIVE AND FUNCTIONAL STATUS - GENERAL
STANDING UP FROM CHAIR USING ARMS: A LITTLE
DRESSING REGULAR UPPER BODY CLOTHING: A LITTLE
WALKING IN HOSPITAL ROOM: A LITTLE
MOVING TO AND FROM BED TO CHAIR: A LITTLE
EATING MEALS: A LITTLE
SUGGESTED CMS G CODE MODIFIER MOBILITY: CK
PERSONAL GROOMING: A LITTLE
HELP NEEDED FOR BATHING: A LITTLE
MOBILITY SCORE: 18
MOVING FROM LYING ON BACK TO SITTING ON SIDE OF FLAT BED: A LITTLE
DAILY ACTIVITIY SCORE: 18
SUGGESTED CMS G CODE MODIFIER DAILY ACTIVITY: CK
DRESSING REGULAR LOWER BODY CLOTHING: A LITTLE
TOILETING: A LITTLE
CLIMB 3 TO 5 STEPS WITH RAILING: A LITTLE
TURNING FROM BACK TO SIDE WHILE IN FLAT BAD: A LITTLE

## 2022-07-28 ASSESSMENT — PATIENT HEALTH QUESTIONNAIRE - PHQ9
3. TROUBLE FALLING OR STAYING ASLEEP OR SLEEPING TOO MUCH: NEARLY EVERY DAY
7. TROUBLE CONCENTRATING ON THINGS, SUCH AS READING THE NEWSPAPER OR WATCHING TELEVISION: NEARLY EVERY DAY
1. LITTLE INTEREST OR PLEASURE IN DOING THINGS: NEARLY EVERY DAY
6. FEELING BAD ABOUT YOURSELF - OR THAT YOU ARE A FAILURE OR HAVE LET YOURSELF OR YOUR FAMILY DOWN: NEARLY EVERY DAY
5. POOR APPETITE OR OVEREATING: NEARLY EVERY DAY
SUM OF ALL RESPONSES TO PHQ9 QUESTIONS 1 AND 2: 6
SUM OF ALL RESPONSES TO PHQ QUESTIONS 1-9: 24
2. FEELING DOWN, DEPRESSED, IRRITABLE, OR HOPELESS: NEARLY EVERY DAY
4. FEELING TIRED OR HAVING LITTLE ENERGY: NEARLY EVERY DAY
9. THOUGHTS THAT YOU WOULD BE BETTER OFF DEAD, OR OF HURTING YOURSELF: NOT AT ALL
8. MOVING OR SPEAKING SO SLOWLY THAT OTHER PEOPLE COULD HAVE NOTICED. OR THE OPPOSITE, BEING SO FIGETY OR RESTLESS THAT YOU HAVE BEEN MOVING AROUND A LOT MORE THAN USUAL: NEARLY EVERY DAY

## 2022-07-28 ASSESSMENT — PAIN DESCRIPTION - PAIN TYPE
TYPE: CHRONIC PAIN
TYPE: CHRONIC PAIN
TYPE: ACUTE PAIN
TYPE: CHRONIC PAIN

## 2022-07-28 ASSESSMENT — FIBROSIS 4 INDEX
FIB4 SCORE: 0.63
FIB4 SCORE: 0.78

## 2022-07-28 NOTE — ASSESSMENT & PLAN NOTE
Evident symptoms: Epigastric pain, nausea, vomiting  WBC 13.5, bicarb 17, AG 30, glucose 393.  Lactic acid 2.1.  Patient nonadherent to insulin therapy at home Novolin 70/30  - Hold home insulin at this time, will need to come up with an alternative regiment  - Counseling given to patient about DKA and use of insulin, risk of repeat hospitalizations if not adhering to therapy  Has uncontrolled T2DM, last A1c 14  -ADAT

## 2022-07-28 NOTE — H&P
Hospital Medicine History & Physical Note    Date of Service  7/28/2022    Primary Care Physician  Pcp Pt States None    Consultants  none  Specialist Names: N/A    Code Status  Full Code    Chief Complaint  Chief Complaint   Patient presents with   • Abdominal Pain     Pt reports severe abdominal pain for 4 days. Denies any fevers.   • N/V     Pt reports it started 4 days ago.        History of Presenting Illness  Christoph Taylor is a 57 y.o. male who presented 7/28/2022 with abdominal pain and emesis at home.  Patient's pain severe, epigastric, non-radiating, causing nausea, vomited at home multiple times, was taking ibuprofen, stated he had red color to vomit and dark stools. He states he fell at home, has left leg boot in place from fractures. He states he cant get around to his appointments, has not obtained PCP since changing insurances in Jan 2022 from [employer] to current.    He had onset of symptoms days ago, did come to ED and was treated for mild DKA. He has not been using his insulin at home (Novolin 70/30). At prior visit, beta hydroxybutyrate was higher than normal reference ranges.  At that time, patient removed his own IV lines and did not want it replaced, thus he completed treatment without IV meds.    Spoke with EDP Dr. Schwarz, patient presenting with WBC 13.5, bicarb 17, AG 30, glucose 393, Na 134. CT scan showed distal esophagitis.  Patient requiring fentanyl IV dose, 2L LR boluses given.  EMS gave antiemetic. BP on arrival 85/66. Patient tachycardic HR 95.  SIRS criteria met, no obvious infection.    I discussed the plan of care with patient, bedside RN, charge RN, , pharmacy and EDP.    Review of Systems  Review of Systems   Constitutional: Negative for chills, fever and malaise/fatigue.   HENT: Negative for congestion and hearing loss.    Eyes: Negative for blurred vision and pain.   Respiratory: Negative for cough and shortness of breath.    Cardiovascular: Negative for chest pain and  palpitations.   Gastrointestinal: Positive for abdominal pain and nausea. Negative for constipation, diarrhea and vomiting.   Genitourinary: Negative for dysuria and hematuria.   Musculoskeletal: Negative for back pain and joint pain.   Skin: Negative for itching and rash.   Neurological: Negative for dizziness and headaches.   Psychiatric/Behavioral: The patient is not nervous/anxious and does not have insomnia.    All other systems reviewed and are negative.    Past Medical History   has a past medical history of Diabetes (HCC) and Hypercholesteremia.    Surgical History   has a past surgical history that includes other.     Family History  family history includes Heart Disease in his father.   Family history reviewed with patient. There is no family history that is pertinent to the chief complaint.     Social History   reports that he has quit smoking. He has never used smokeless tobacco. He reports previous alcohol use. He reports previous drug use.    Allergies  No Known Allergies    Medications  Prior to Admission Medications   Prescriptions Last Dose Informant Patient Reported? Taking?   Insulin NPH Isophane & Regular (NOVOLIN 70/30 FLEXPEN SC)  Patient Yes No   Sig: Inject 28 Units under the skin 2 times a day.   aspirin (ASA) 81 MG Chew Tab chewable tablet  Patient No No   Sig: Chew 1 Tablet every day.   Patient not taking: Reported on 7/26/2022      Facility-Administered Medications: None       Physical Exam  Temp:  [35.9 °C (96.7 °F)] 35.9 °C (96.7 °F)  Pulse:  [89-95] 91  Resp:  [18] 18  BP: ()/(66-78) 101/69  SpO2:  [96 %-100 %] 100 %  Blood Pressure: 123/78   Temperature: 35.9 °C (96.7 °F)   Pulse: 89   Respiration: 18   Pulse Oximetry: 98 %       Physical Exam  Vitals and nursing note reviewed.   Constitutional:       General: He is not in acute distress.     Comments: Thin appearing male   HENT:      Head: Normocephalic and atraumatic.      Nose: Nose normal. No congestion.   Eyes:       General: No scleral icterus.     Extraocular Movements: Extraocular movements intact.   Cardiovascular:      Rate and Rhythm: Normal rate and regular rhythm.      Pulses: Normal pulses.      Heart sounds: Normal heart sounds. No murmur heard.  Pulmonary:      Effort: No respiratory distress.      Breath sounds: Normal breath sounds.      Comments: Wearing 2 L nasal cannula  Abdominal:      General: Bowel sounds are normal. There is no distension.      Tenderness: There is abdominal tenderness.   Musculoskeletal:         General: No swelling. Normal range of motion.      Cervical back: Normal range of motion and neck supple.      Comments: Left leg with metal brace   Skin:     General: Skin is warm.      Capillary Refill: Capillary refill takes less than 2 seconds.      Coloration: Skin is not jaundiced.      Findings: Lesion (Bilateral hands dry healing ulcers) present.   Neurological:      General: No focal deficit present.      Mental Status: He is alert and oriented to person, place, and time. Mental status is at baseline.   Psychiatric:         Mood and Affect: Mood normal.         Behavior: Behavior normal.         Thought Content: Thought content normal.         Judgment: Judgment normal.         Laboratory:  Recent Labs     07/26/22  1421 07/28/22  0655   WBC 10.5 13.5*   RBC 5.42 5.43   HEMOGLOBIN 14.9 15.2   HEMATOCRIT 43.5 44.5   MCV 80.3* 82.0   MCH 27.5 28.0   MCHC 34.3 34.2   RDW 36.6 38.1   PLATELETCT 290 297   MPV 8.7* 8.8*     Recent Labs     07/26/22  1421 07/28/22  0655   SODIUM 131* 134*   POTASSIUM 4.0 4.4   CHLORIDE 86* 87*   CO2 22 17*   GLUCOSE 335* 393*   BUN 14 19   CREATININE 0.67 0.94   CALCIUM 8.9 8.4     Recent Labs     07/26/22  1421 07/28/22  0655   ALTSGPT 13 10   ASTSGOT 14 9*   ALKPHOSPHAT 95 96   TBILIRUBIN 0.7 0.6   LIPASE 9* 8   GLUCOSE 335* 393*         No results for input(s): NTPROBNP in the last 72 hours.      Recent Labs     07/28/22  0655   TROPONINT 20*        Imaging:  CT-ABDOMEN-PELVIS WITH   Final Result         1. Diffuse wall thickening of the distal esophagus could relate to esophagitis, similar to prior.   2. Moderate amount of stool throughout the colon.   3. Horseshoe kidney. No hydronephrosis. No obstructive left renal calculus.          EKG:  I have personally reviewed the images and compared with prior images. and QTc 477ms    Assessment/Plan:  Justification for Admission Status  I anticipate this patient will require at least two midnights for appropriate medical management, necessitating inpatient admission because patient is in mild to moderate DKA, he has not been adherent to insulin therapy at home, has ongoing abdominal pain and minimally tolerating oral fluids/diet.  He has presented to the ED multiple days this week and not improving. He is high risk for worsening to severe DKA, evident with high BHB, low bicarbonate levels, increased AG and non-adherence to insulin at home.  He will need more than 2 midnights to close his DKA, ongoing insulin/medical therapy education.    Patient will need a  bed on MEDICINE service .  The need is secondary to DKA.    * Ketoacidosis, diabetic, type 2, no coma (HCC)- (present on admission)  Assessment & Plan  Evident symptoms: Epigastric pain, nausea, vomiting  WBC 13.5, bicarb 17, AG 30, glucose 393.  Lactic acid 2.1.  Patient nonadherent to insulin therapy at home Novolin 70/30  - Hold home insulin at this time, will need to come up with an alternative regiment  - Counseling given to patient about DKA and use of insulin, risk of repeat hospitalizations if not adhering to therapy  Has uncontrolled T2DM, last A1c 14  -Clear liquid diet trial    Uncontrolled type 2 diabetes mellitus with hyperglycemia (HCC)- (present on admission)  Assessment & Plan  A1c 14.0 (11/2021)  Patient not adherent to insulin therapy at home  Unclear if he will be adherent to PO medications as well.  High risk for repeat admissions and  complications from T2DM, patient counseled on admission.  Patient is not on a statin medication, as per guidelines recommend moderate atorvastatin  Patient is not on guideline recommended ACE inhibitor/ARB.  Blood pressure is low at this time, will need to consider low-dose lisinopril prior to discharge if BP is tolerant.    Noncompliance with medications- (present on admission)  Assessment & Plan  Patient understands how to use and administer his own insulin, but does not consistently use.  Resulting in DKA.  Provided on admission.  Continue counseling and education on this hospitalization.  Provided on admission.  Social work consult to help with PCP establishment  Called patient's outpatient pharmacy, [facility] on [address], last filled 05/15/22 for 90 days flexpen, 08/10/22 next refill.    Complaint of melena- (present on admission)  Assessment & Plan  Patient stated he possibly threw up blood and has had black stools  Checking FOBT  Hemoglobin appears to be stable  Patient states he takes ibuprofen daily.  Counseled patient on abdominal pain and use of ibuprofen, risks involved.    Esophagitis- (present on admission)  Assessment & Plan  Acute esophagitis on CT scan with LUQ pain from patient.  - IV PPI    Hyponatremia- (present on admission)  Assessment & Plan  Acute Hypovolemic hyponatremia  - continue IVF, recheck labs in AM    Leukocytosis- (present on admission)  Assessment & Plan  WBC 13.5  May be due to dehydration. SIRS criteria met with HR 95 and WBC. No evident infection source.  - IVF, recheck CBC in AM  - hold ABX    VTE prophylaxis: enoxaparin ppx on hold until occult blood is tested.

## 2022-07-28 NOTE — PROGRESS NOTES
Hospital Medicine Note    Date of Service  7/28/2022    Patient's BP was downtrending. 73/47 at bedside. LR bolus being run. Patient placed in Trendelenburg position.     I voalted messaged Dr. Ring for evaluation as patient has DKA, and may be worsening. Repeat BhB remains unreadable, levels too high.   Intensivist reviewed case recommending ICU for insulin gtt.  ABG stat.

## 2022-07-28 NOTE — ED NOTES
Dr. Greenberg at bedside. Pt requesting pain medication. Pt explained BP was too low for pain medication at this time. IVF bolus infusing.

## 2022-07-28 NOTE — ED NOTES
BP taken prior to transporting pt upstairs. Pt hypotensive. IVF bolus initiated. Paging Dr. Greenberg for orders. Pt awakes to voice, speaking in full sentences. complains of back pain.

## 2022-07-28 NOTE — ED NOTES
Pharmacy Medication Reconciliation      ~Medication reconciliation updated and complete per patient at bedside  ~Allergies have been verified  ~No oral ABX within the last 30 days  ~Patient home pharmacy: [facility]

## 2022-07-28 NOTE — ASSESSMENT & PLAN NOTE
Patient stated he possibly threw up blood and has had black stools  Checking FOBT  Hemoglobin appears to be stable  Patient states he takes ibuprofen daily.  Counseled patient on abdominal pain and use of ibuprofen, risks involved.

## 2022-07-28 NOTE — ED PROVIDER NOTES
"ED Provider Note    ED Provider Note    Primary care provider: Pcp Pt States None  Means of arrival: EMS  History obtained from: patient  History limited by: None    CHIEF COMPLAINT  Chief Complaint   Patient presents with   • Abdominal Pain     Pt reports severe abdominal pain for 4 days. Denies any fevers.   • N/V     Pt reports it started 4 days ago.        HPI  Christoph Taylor is a 57 y.o. male who presents to the Emergency Department via EMS.  He lives alone.  He states he has been having sharp, epigastric, left upper quadrant abdominal pain since a fall about a week ago when he states he fractured his back.  He was seen in the emergency department for that.  He denies having a fever.  He does report an episode of nausea and vomiting this morning.  He states he has been generally weak and really has barely gotten out of bed for the last week and has not taken any of his medications.  He states he feels \"dehydrated\".  His medications consists only of NovoLog.  He has type 2 diabetes.  He previously was on metformin but it gave him vertigo and he is only on insulin at this time.  He reports urine output that is \"just a little bit\".  He has not had dysuria or hematuria.  No chest pain.  No shortness of breath he recently had constipation which has since resolved.  No diarrhea.  Patient's blood pressure was low, 85/66 for EMS.  He was given antiemetics.  An IV was started, blood pressure is improved.  He denies drinking, drugs or tobacco.  He has no allergies.  He denies significant family history.  Blood sugar for EMS, greater than 300.    REVIEW OF SYSTEMS  Review of Systems   Constitutional: Positive for malaise/fatigue. Negative for chills and fever.   HENT: Negative for congestion.    Respiratory: Negative for cough and shortness of breath.    Cardiovascular: Negative for chest pain and leg swelling.   Gastrointestinal: Positive for abdominal pain, nausea and vomiting. Negative for diarrhea.   Genitourinary: " Negative for dysuria, frequency and urgency.   Musculoskeletal: Negative for back pain and falls.   Skin: Negative for rash.   Neurological: Positive for weakness.        Generalized   Psychiatric/Behavioral: Negative for substance abuse.       PAST MEDICAL HISTORY   has a past medical history of Diabetes (HCC) and Hypercholesteremia.    SURGICAL HISTORY   has a past surgical history that includes other.    SOCIAL HISTORY  Social History     Tobacco Use   • Smoking status: Former Smoker   • Smokeless tobacco: Never Used   Substance Use Topics   • Alcohol use: Not Currently   • Drug use: Not Currently      Social History     Substance and Sexual Activity   Drug Use Not Currently       FAMILY HISTORY  Family History   Problem Relation Age of Onset   • Heart Disease Father        CURRENT MEDICATIONS  Home Medications     Reviewed by Tasneem Weber R.N. (Registered Nurse) on 07/28/22 at 0653  Med List Status: Not Addressed   Medication Last Dose Status   aspirin (ASA) 81 MG Chew Tab chewable tablet  Active   Insulin NPH Isophane & Regular (NOVOLIN 70/30 FLEXPEN SC)  Active                ALLERGIES  No Known Allergies    PHYSICAL EXAM  VITAL SIGNS: /78   Pulse 89   Temp 35.9 °C (96.7 °F) (Temporal)   Resp 18   Ht 1.829 m (6')   Wt 81.6 kg (180 lb)   SpO2 98%   BMI 24.41 kg/m²   Vitals reviewed.  Constitutional: Patient is oriented to person, place, and time.  Chronically ill-appearing.  Mild distress.    Head: Normocephalic and atraumatic.   Ears: Normal external ears bilaterally.   Mouth/Throat: Oropharynx is clear, with dry mucous membranes  Eyes: Conjunctivae are normal. Pupils are equal and round.  Neck: Normal range of motion. Neck supple.  Cardiovascular: Normal rate, regular rhythm and normal heart sounds. Normal peripheral pulses.  Pulmonary/Chest: Effort normal and breath sounds normal. No respiratory distress, no wheezes, rhonchi, or rales. No chest wall tenderness.  Abdominal: Soft. Bowel sounds  are normal. There is localized epigastric and left upper quadrant tenderness. No rebound or guarding, or peritoneal signs. No CVA tenderness.  Musculoskeletal: No edema and no tenderness.   Lymphadenopathy: No cervical adenopathy.   Neurological: No focal deficits.   Skin: Skin is warm and dry. No erythema. No pallor.   Psychiatric: Patient has a normal mood and affect.     LABS  Results for orders placed or performed during the hospital encounter of 07/28/22   CBC w/ Differential   Result Value Ref Range    WBC 13.5 (H) 4.8 - 10.8 K/uL    RBC 5.43 4.70 - 6.10 M/uL    Hemoglobin 15.2 14.0 - 18.0 g/dL    Hematocrit 44.5 42.0 - 52.0 %    MCV 82.0 81.4 - 97.8 fL    MCH 28.0 27.0 - 33.0 pg    MCHC 34.2 33.7 - 35.3 g/dL    RDW 38.1 35.9 - 50.0 fL    Platelet Count 297 164 - 446 K/uL    MPV 8.8 (L) 9.0 - 12.9 fL    Neutrophils-Polys 86.80 (H) 44.00 - 72.00 %    Lymphocytes 6.80 (L) 22.00 - 41.00 %    Monocytes 5.50 0.00 - 13.40 %    Eosinophils 0.10 0.00 - 6.90 %    Basophils 0.10 0.00 - 1.80 %    Immature Granulocytes 0.70 0.00 - 0.90 %    Nucleated RBC 0.00 /100 WBC    Neutrophils (Absolute) 11.75 (H) 1.82 - 7.42 K/uL    Lymphs (Absolute) 0.92 (L) 1.00 - 4.80 K/uL    Monos (Absolute) 0.74 0.00 - 0.85 K/uL    Eos (Absolute) 0.01 0.00 - 0.51 K/uL    Baso (Absolute) 0.02 0.00 - 0.12 K/uL    Immature Granulocytes (abs) 0.09 0.00 - 0.11 K/uL    NRBC (Absolute) 0.00 K/uL   Complete Metabolic Panel (CMP)   Result Value Ref Range    Sodium 134 (L) 135 - 145 mmol/L    Potassium 4.4 3.6 - 5.5 mmol/L    Chloride 87 (L) 96 - 112 mmol/L    Co2 17 (L) 20 - 33 mmol/L    Anion Gap 30.0 (H) 7.0 - 16.0    Glucose 393 (H) 65 - 99 mg/dL    Bun 19 8 - 22 mg/dL    Creatinine 0.94 0.50 - 1.40 mg/dL    Calcium 8.4 8.4 - 10.2 mg/dL    AST(SGOT) 9 (L) 12 - 45 U/L    ALT(SGPT) 10 2 - 50 U/L    Alkaline Phosphatase 96 30 - 99 U/L    Total Bilirubin 0.6 0.1 - 1.5 mg/dL    Albumin 3.6 3.2 - 4.9 g/dL    Total Protein 6.3 6.0 - 8.2 g/dL    Globulin  2.7 1.9 - 3.5 g/dL    A-G Ratio 1.3 g/dL   Troponin   Result Value Ref Range    Troponin T 20 (H) 6 - 19 ng/L   LIPASE   Result Value Ref Range    Lipase 8 7 - 58 U/L   ESTIMATED GFR   Result Value Ref Range    GFR (CKD-EPI) 94 >60 mL/min/1.73 m 2   EKG (NOW)   Result Value Ref Range    Report       [facility] Emergency Dept.    Test Date:  2022  Pt Name:    BAILEE JACOBSON               Department: [facility]  MRN:        [id number]                      [facility]  Gender:     Male                         Technician: DEBBIE  :        1965                   Requested By:SHALINI KENNEDY  Order #:    [id number]                    Reading MD: SHALINI KENNEDY DO    Measurements  Intervals                                Axis  Rate:       92                           P:          84  CO:         160                          QRS:        89  QRSD:       92                           T:          46  QT:         385  QTc:        477    Interpretive Statements  Sinus rhythm  Borderline prolonged QT interval  Compared to ECG 2021 07:37:40  No significant changes  Electronically Signed On 2022 7:02:48 PDT by SHALINI KENNEDY DO     POCT glucose device results   Result Value Ref Range    POC Glucose, Blood 357 (H) 65 - 99 mg/dL       All labs reviewed by me.    EKG Interpretation  Interpreted by me    RADIOLOGY  CT-ABDOMEN-PELVIS WITH   Final Result         1. Diffuse wall thickening of the distal esophagus could relate to esophagitis, similar to prior.   2. Moderate amount of stool throughout the colon.   3. Horseshoe kidney. No hydronephrosis. No obstructive left renal calculus.        The radiologist's interpretation of all radiological studies have been reviewed by me.    COURSE & MEDICAL DECISION MAKING  Pertinent Labs & Imaging studies reviewed. (See chart for details)    Obtained and reviewed past medical records.  Patient's last encounter was .  He was seen for abdominal  pain, back pain and constipation.  Patient did have several abnormal lab values at that time.Sodium low 131.  His bicarb was in the normal range.  Anion gap 20 through and his glucose was 335.  Beta hydroxybutyric acid was greater than 8.  Plain film of his abdomen shows moderate stool in the colon suggesting constipation.  Patient underwent imaging of his cervical spine in January of this year which showed anatomic alignment, no fracture.  There was no soft tissue swelling.  There was evidence of degenerative changes from C4-C7 with disc space narrowing and vertebral body osteophyte.  Congenital fusion of C7-T1 noted.    6:54 AM - Patient seen and examined at bedside.  This is a 57-year-old male.  He lives alone.  He presents with localized abdominal pain and an episode of nausea and vomiting.  He has been not been taking his medications for a week generally feeling weak.  He denies a fever.  He reports decreased urine output.  He reports feeling dehydrated and clinically, he does appear to be dehydrated.  An IV was started by EMS and he was treated with antiemetics.  His blood pressure is already improved from 85/66 to now 123/78.  He is afebrile, saturating well on room air.  There is no increased work of breathing he is not tachycardic.  We will continue IV fluid resuscitation given presenting symptoms.  Labs including beta hydroxybutyric acid, CBC, chemistry, lipase, troponin have been ordered.  I reviewed the patient's EKG and it does not show any significant changes.  I do not see evidence of prior imaging in our EMR from patient's reported back fracture.  Patient's chart is marked for merge with medical record #[id number].  There are no results under this MRN either in no previous imaging.  I have ordered CT imaging as well as pain medication for further evaluation.  He will be kept NPO.    0825AM patient's reevaluated at the bedside.  He is resting.  He awakens as he entered the room.  No change in condition.   He is maintaining his blood pressure.  I have discussed with him the fact that he has multiple abnormal labs and I feel he should be admitted to the hospital for continued supportive care.  He is agreeable.  The hospitalist has been paged.    0835AM D/W Dr. Greenberg, hospitalist, who agrees to admit the patient to their service.  He is updated on patient condition, that he is maintaining blood pressure and has not had any emesis here in the ED. We discussed his lab abnormalities.  Patient is receiving continued IV fluid resuscitation here in the ED.  He is admitted in stable but guarded condition.    FINAL IMPRESSION  1. Diabetic ketoacidosis without coma associated with type 2 diabetes mellitus (HCC)    2. Dehydration    3. Non-intractable vomiting with nausea, unspecified vomiting type    4. Esophagitis    5. Constipation, unspecified constipation type

## 2022-07-28 NOTE — DISCHARGE PLANNING
ER CM met with pt at bedside . AOX4. Pleasant. Lives alone in 1 story home. Reports walks with FWW and has kneeling scooter. He has gotten off balance and fallen with scooter recently. He is in a boot on the left foot. No other DME or o2 at home. Son is his support person. He lives about 3 hrs away in [place] [phone] Please call with room number and nursing station info. He has been taking oxycontin at home for pain related to vertebral injury from falling off scooter and that related to constipation issues. NO PCP  CHW left  for eval of resources needed.   Care Transition Team Assessment    Information Source  Orientation Level: Oriented X4  Information Given By: Patient  Informant's Name: Christoph  Who is responsible for making decisions for patient? : Patient         Elopement Risk  Legal Hold: No    Interdisciplinary Discharge Planning  Primary Care Physician: NONE  Lives with - Patient's Self Care Capacity: Adult Children  Support Systems: Children  Do You Take your Prescribed Medications Regularly: Yes  Mobility Issues: Yes  Assistance Needed: Yes  Durable Medical Equipment: Walker, Other - Specify (scooter kneeling)    Discharge Preparedness  What is your plan after discharge?: Uncertain - pending medical team collaboration, Home with help  What are your discharge supports?: Child  Prior Functional Level: Ambulatory, Independent with Activities of Daily Living, Independent with Medication Management, Uses Walker, Other (Comments)    Functional Assesment  Prior Functional Level: Ambulatory, Independent with Activities of Daily Living, Independent with Medication Management, Uses Walker, Other (Comments)    Finances  Prescription Coverage: Yes ([facility])                   Domestic Abuse  Have you ever been the victim of abuse or violence?: No         Discharge Risks or Barriers  Discharge risks or barriers?: No PCP    Anticipated Discharge Information  Discharge Disposition: Discharged to  home/self care (01)

## 2022-07-28 NOTE — ASSESSMENT & PLAN NOTE
A1c 14.0 (11/2021)  Patient not adherent to insulin therapy at home  Unclear if he will be adherent to PO medications as well.  High risk for repeat admissions and complications from T2DM, patient counseled on admission.

## 2022-07-28 NOTE — ASSESSMENT & PLAN NOTE
Patient understands how to use and administer his own insulin, but does not consistently use.  Resulting in DKA.  Provided on admission.  Continue counseling and education on this hospitalization.  Provided on admission.  Social work consult to help with PCP establishment  Prior MD called patient's outpatient pharmacy, [facility] on [address], last filled 05/15/22 for 90 days flexpen, 08/10/22 next refill.

## 2022-07-29 LAB
ALBUMIN SERPL BCP-MCNC: 2.7 G/DL (ref 3.2–4.9)
ANION GAP SERPL CALC-SCNC: 8 MMOL/L (ref 7–16)
ANION GAP SERPL CALC-SCNC: 9 MMOL/L (ref 7–16)
B PARAP IS1001 DNA NPH QL NAA+NON-PROBE: NOT DETECTED
B PERT.PT PRMT NPH QL NAA+NON-PROBE: NOT DETECTED
BUN SERPL-MCNC: 10 MG/DL (ref 8–22)
BUN SERPL-MCNC: 12 MG/DL (ref 8–22)
BUN SERPL-MCNC: 13 MG/DL (ref 8–22)
BUN SERPL-MCNC: 7 MG/DL (ref 8–22)
BUN SERPL-MCNC: 8 MG/DL (ref 8–22)
C PNEUM DNA NPH QL NAA+NON-PROBE: NOT DETECTED
CALCIUM SERPL-MCNC: 7.7 MG/DL (ref 8.4–10.2)
CALCIUM SERPL-MCNC: 7.8 MG/DL (ref 8.4–10.2)
CALCIUM SERPL-MCNC: 7.8 MG/DL (ref 8.4–10.2)
CALCIUM SERPL-MCNC: 7.9 MG/DL (ref 8.4–10.2)
CALCIUM SERPL-MCNC: 7.9 MG/DL (ref 8.4–10.2)
CALCIUM SERPL-MCNC: 8 MG/DL (ref 8.4–10.2)
CALCIUM SERPL-MCNC: 8.2 MG/DL (ref 8.4–10.2)
CHLORIDE SERPL-SCNC: 100 MMOL/L (ref 96–112)
CHLORIDE SERPL-SCNC: 101 MMOL/L (ref 96–112)
CHLORIDE SERPL-SCNC: 102 MMOL/L (ref 96–112)
CHLORIDE SERPL-SCNC: 103 MMOL/L (ref 96–112)
CHLORIDE SERPL-SCNC: 103 MMOL/L (ref 96–112)
CO2 SERPL-SCNC: 26 MMOL/L (ref 20–33)
CO2 SERPL-SCNC: 27 MMOL/L (ref 20–33)
CREAT SERPL-MCNC: 0.54 MG/DL (ref 0.5–1.4)
CREAT SERPL-MCNC: 0.55 MG/DL (ref 0.5–1.4)
CREAT SERPL-MCNC: 0.57 MG/DL (ref 0.5–1.4)
CREAT SERPL-MCNC: 0.58 MG/DL (ref 0.5–1.4)
CREAT SERPL-MCNC: 0.62 MG/DL (ref 0.5–1.4)
CREAT SERPL-MCNC: 0.62 MG/DL (ref 0.5–1.4)
CREAT SERPL-MCNC: 0.65 MG/DL (ref 0.5–1.4)
EKG IMPRESSION: NORMAL
ERYTHROCYTE [DISTWIDTH] IN BLOOD BY AUTOMATED COUNT: 39.8 FL (ref 35.9–50)
FLUAV RNA NPH QL NAA+NON-PROBE: NOT DETECTED
FLUBV RNA NPH QL NAA+NON-PROBE: NOT DETECTED
GFR SERPLBLD CREATININE-BSD FMLA CKD-EPI: 109 ML/MIN/1.73 M 2
GFR SERPLBLD CREATININE-BSD FMLA CKD-EPI: 111 ML/MIN/1.73 M 2
GFR SERPLBLD CREATININE-BSD FMLA CKD-EPI: 111 ML/MIN/1.73 M 2
GFR SERPLBLD CREATININE-BSD FMLA CKD-EPI: 113 ML/MIN/1.73 M 2
GFR SERPLBLD CREATININE-BSD FMLA CKD-EPI: 114 ML/MIN/1.73 M 2
GFR SERPLBLD CREATININE-BSD FMLA CKD-EPI: 115 ML/MIN/1.73 M 2
GFR SERPLBLD CREATININE-BSD FMLA CKD-EPI: 115 ML/MIN/1.73 M 2
GLUCOSE BLD STRIP.AUTO-MCNC: 102 MG/DL (ref 65–99)
GLUCOSE BLD STRIP.AUTO-MCNC: 105 MG/DL (ref 65–99)
GLUCOSE BLD STRIP.AUTO-MCNC: 112 MG/DL (ref 65–99)
GLUCOSE BLD STRIP.AUTO-MCNC: 114 MG/DL (ref 65–99)
GLUCOSE BLD STRIP.AUTO-MCNC: 118 MG/DL (ref 65–99)
GLUCOSE BLD STRIP.AUTO-MCNC: 123 MG/DL (ref 65–99)
GLUCOSE BLD STRIP.AUTO-MCNC: 133 MG/DL (ref 65–99)
GLUCOSE BLD STRIP.AUTO-MCNC: 141 MG/DL (ref 65–99)
GLUCOSE BLD STRIP.AUTO-MCNC: 146 MG/DL (ref 65–99)
GLUCOSE BLD STRIP.AUTO-MCNC: 149 MG/DL (ref 65–99)
GLUCOSE BLD STRIP.AUTO-MCNC: 153 MG/DL (ref 65–99)
GLUCOSE BLD STRIP.AUTO-MCNC: 154 MG/DL (ref 65–99)
GLUCOSE BLD STRIP.AUTO-MCNC: 158 MG/DL (ref 65–99)
GLUCOSE BLD STRIP.AUTO-MCNC: 173 MG/DL (ref 65–99)
GLUCOSE BLD STRIP.AUTO-MCNC: 72 MG/DL (ref 65–99)
GLUCOSE BLD STRIP.AUTO-MCNC: 83 MG/DL (ref 65–99)
GLUCOSE BLD STRIP.AUTO-MCNC: 89 MG/DL (ref 65–99)
GLUCOSE BLD STRIP.AUTO-MCNC: 96 MG/DL (ref 65–99)
GLUCOSE BLD STRIP.AUTO-MCNC: 99 MG/DL (ref 65–99)
GLUCOSE SERPL-MCNC: 115 MG/DL (ref 65–99)
GLUCOSE SERPL-MCNC: 116 MG/DL (ref 65–99)
GLUCOSE SERPL-MCNC: 131 MG/DL (ref 65–99)
GLUCOSE SERPL-MCNC: 145 MG/DL (ref 65–99)
GLUCOSE SERPL-MCNC: 146 MG/DL (ref 65–99)
GLUCOSE SERPL-MCNC: 166 MG/DL (ref 65–99)
GLUCOSE SERPL-MCNC: 91 MG/DL (ref 65–99)
HADV DNA NPH QL NAA+NON-PROBE: NOT DETECTED
HCOV 229E RNA NPH QL NAA+NON-PROBE: NOT DETECTED
HCOV HKU1 RNA NPH QL NAA+NON-PROBE: NOT DETECTED
HCOV NL63 RNA NPH QL NAA+NON-PROBE: NOT DETECTED
HCOV OC43 RNA NPH QL NAA+NON-PROBE: NOT DETECTED
HCT VFR BLD AUTO: 36.6 % (ref 42–52)
HEMOCCULT STL QL: POSITIVE
HGB BLD-MCNC: 12.5 G/DL (ref 14–18)
HMPV RNA NPH QL NAA+NON-PROBE: NOT DETECTED
HPIV1 RNA NPH QL NAA+NON-PROBE: NOT DETECTED
HPIV2 RNA NPH QL NAA+NON-PROBE: NOT DETECTED
HPIV3 RNA NPH QL NAA+NON-PROBE: NOT DETECTED
HPIV4 RNA NPH QL NAA+NON-PROBE: NOT DETECTED
M PNEUMO DNA NPH QL NAA+NON-PROBE: NOT DETECTED
MAGNESIUM SERPL-MCNC: 1.7 MG/DL (ref 1.5–2.5)
MAGNESIUM SERPL-MCNC: 2 MG/DL (ref 1.5–2.5)
MAGNESIUM SERPL-MCNC: 2.1 MG/DL (ref 1.5–2.5)
MCH RBC QN AUTO: 28.2 PG (ref 27–33)
MCHC RBC AUTO-ENTMCNC: 34.2 G/DL (ref 33.7–35.3)
MCV RBC AUTO: 82.4 FL (ref 81.4–97.8)
PHOSPHATE SERPL-MCNC: 1.4 MG/DL (ref 2.5–4.5)
PHOSPHATE SERPL-MCNC: 2.4 MG/DL (ref 2.5–4.5)
PHOSPHATE SERPL-MCNC: 3 MG/DL (ref 2.5–4.5)
PLATELET # BLD AUTO: 211 K/UL (ref 164–446)
PMV BLD AUTO: 9 FL (ref 9–12.9)
POTASSIUM SERPL-SCNC: 3.5 MMOL/L (ref 3.6–5.5)
POTASSIUM SERPL-SCNC: 3.5 MMOL/L (ref 3.6–5.5)
POTASSIUM SERPL-SCNC: 3.6 MMOL/L (ref 3.6–5.5)
POTASSIUM SERPL-SCNC: 3.6 MMOL/L (ref 3.6–5.5)
POTASSIUM SERPL-SCNC: 3.7 MMOL/L (ref 3.6–5.5)
RBC # BLD AUTO: 4.44 M/UL (ref 4.7–6.1)
RSV RNA NPH QL NAA+NON-PROBE: NOT DETECTED
RV+EV RNA NPH QL NAA+NON-PROBE: NOT DETECTED
SARS-COV-2 RNA NPH QL NAA+NON-PROBE: NOTDETECTED
SODIUM SERPL-SCNC: 134 MMOL/L (ref 135–145)
SODIUM SERPL-SCNC: 136 MMOL/L (ref 135–145)
SODIUM SERPL-SCNC: 136 MMOL/L (ref 135–145)
SODIUM SERPL-SCNC: 137 MMOL/L (ref 135–145)
SODIUM SERPL-SCNC: 138 MMOL/L (ref 135–145)
TROPONIN T SERPL-MCNC: 17 NG/L (ref 6–19)
WBC # BLD AUTO: 11.8 K/UL (ref 4.8–10.8)

## 2022-07-29 PROCEDURE — 84100 ASSAY OF PHOSPHORUS: CPT

## 2022-07-29 PROCEDURE — 80048 BASIC METABOLIC PNL TOTAL CA: CPT | Mod: 91

## 2022-07-29 PROCEDURE — C9113 INJ PANTOPRAZOLE SODIUM, VIA: HCPCS | Performed by: INTERNAL MEDICINE

## 2022-07-29 PROCEDURE — 93005 ELECTROCARDIOGRAM TRACING: CPT | Performed by: INTERNAL MEDICINE

## 2022-07-29 PROCEDURE — 700105 HCHG RX REV CODE 258: Performed by: INTERNAL MEDICINE

## 2022-07-29 PROCEDURE — 94760 N-INVAS EAR/PLS OXIMETRY 1: CPT

## 2022-07-29 PROCEDURE — 99233 SBSQ HOSP IP/OBS HIGH 50: CPT | Performed by: INTERNAL MEDICINE

## 2022-07-29 PROCEDURE — 87486 CHLMYD PNEUM DNA AMP PROBE: CPT

## 2022-07-29 PROCEDURE — 87633 RESP VIRUS 12-25 TARGETS: CPT

## 2022-07-29 PROCEDURE — 87581 M.PNEUMON DNA AMP PROBE: CPT

## 2022-07-29 PROCEDURE — A9270 NON-COVERED ITEM OR SERVICE: HCPCS | Performed by: INTERNAL MEDICINE

## 2022-07-29 PROCEDURE — 83735 ASSAY OF MAGNESIUM: CPT

## 2022-07-29 PROCEDURE — 80069 RENAL FUNCTION PANEL: CPT

## 2022-07-29 PROCEDURE — 700102 HCHG RX REV CODE 250 W/ 637 OVERRIDE(OP): Performed by: INTERNAL MEDICINE

## 2022-07-29 PROCEDURE — 93010 ELECTROCARDIOGRAM REPORT: CPT | Performed by: INTERNAL MEDICINE

## 2022-07-29 PROCEDURE — 700101 HCHG RX REV CODE 250: Performed by: INTERNAL MEDICINE

## 2022-07-29 PROCEDURE — 82962 GLUCOSE BLOOD TEST: CPT

## 2022-07-29 PROCEDURE — 84484 ASSAY OF TROPONIN QUANT: CPT

## 2022-07-29 PROCEDURE — 82272 OCCULT BLD FECES 1-3 TESTS: CPT

## 2022-07-29 PROCEDURE — 770020 HCHG ROOM/CARE - TELE (206)

## 2022-07-29 PROCEDURE — 85027 COMPLETE CBC AUTOMATED: CPT

## 2022-07-29 PROCEDURE — 700111 HCHG RX REV CODE 636 W/ 250 OVERRIDE (IP): Performed by: INTERNAL MEDICINE

## 2022-07-29 PROCEDURE — 87798 DETECT AGENT NOS DNA AMP: CPT | Mod: 91

## 2022-07-29 RX ORDER — POTASSIUM CHLORIDE 20 MEQ/1
40 TABLET, EXTENDED RELEASE ORAL DAILY
Status: DISCONTINUED | OUTPATIENT
Start: 2022-07-29 | End: 2022-07-31 | Stop reason: HOSPADM

## 2022-07-29 RX ORDER — POTASSIUM CHLORIDE 7.45 MG/ML
10 INJECTION INTRAVENOUS
Status: COMPLETED | OUTPATIENT
Start: 2022-07-29 | End: 2022-07-29

## 2022-07-29 RX ADMIN — PROMETHAZINE HYDROCHLORIDE 12.5 MG: 25 TABLET ORAL at 12:16

## 2022-07-29 RX ADMIN — POTASSIUM PHOSPHATE, MONOBASIC AND POTASSIUM PHOSPHATE, DIBASIC 30 MMOL: 224; 236 INJECTION, SOLUTION, CONCENTRATE INTRAVENOUS at 00:46

## 2022-07-29 RX ADMIN — OXYCODONE HYDROCHLORIDE 5 MG: 5 TABLET ORAL at 05:08

## 2022-07-29 RX ADMIN — POTASSIUM CHLORIDE 10 MEQ: 7.46 INJECTION, SOLUTION INTRAVENOUS at 08:03

## 2022-07-29 RX ADMIN — POTASSIUM CHLORIDE 40 MEQ: 1500 TABLET, EXTENDED RELEASE ORAL at 12:16

## 2022-07-29 RX ADMIN — PANTOPRAZOLE SODIUM 40 MG: 40 INJECTION, POWDER, FOR SOLUTION INTRAVENOUS at 05:08

## 2022-07-29 RX ADMIN — PANTOPRAZOLE SODIUM 40 MG: 40 INJECTION, POWDER, FOR SOLUTION INTRAVENOUS at 16:26

## 2022-07-29 RX ADMIN — INSULIN GLARGINE-YFGN 15 UNITS: 100 INJECTION, SOLUTION SUBCUTANEOUS at 09:16

## 2022-07-29 RX ADMIN — ONDANSETRON 4 MG: 2 INJECTION INTRAMUSCULAR; INTRAVENOUS at 05:02

## 2022-07-29 RX ADMIN — MAGNESIUM SULFATE 2 G: 2 INJECTION INTRAVENOUS at 00:42

## 2022-07-29 RX ADMIN — POTASSIUM CHLORIDE 10 MEQ: 7.46 INJECTION, SOLUTION INTRAVENOUS at 07:54

## 2022-07-29 RX ADMIN — PROCHLORPERAZINE EDISYLATE 10 MG: 5 INJECTION, SOLUTION INTRAMUSCULAR; INTRAVENOUS at 10:10

## 2022-07-29 RX ADMIN — DEXTROSE AND SODIUM CHLORIDE: 10; .45 INJECTION, SOLUTION INTRAVENOUS at 06:09

## 2022-07-29 RX ADMIN — SENNOSIDES AND DOCUSATE SODIUM 2 TABLET: 50; 8.6 TABLET ORAL at 05:08

## 2022-07-29 ASSESSMENT — ENCOUNTER SYMPTOMS
SPUTUM PRODUCTION: 0
HEARTBURN: 0
DIZZINESS: 0
DEPRESSION: 1
PALPITATIONS: 0
ABDOMINAL PAIN: 1
HEADACHES: 0
FEVER: 0
VOMITING: 0
SHORTNESS OF BREATH: 0
NAUSEA: 1
CHILLS: 0
COUGH: 0
MYALGIAS: 0

## 2022-07-29 ASSESSMENT — PAIN DESCRIPTION - PAIN TYPE
TYPE: CHRONIC PAIN
TYPE: ACUTE PAIN;CHRONIC PAIN
TYPE: CHRONIC PAIN
TYPE: CHRONIC PAIN

## 2022-07-29 NOTE — DIETARY
Nutrition Services: Diabetes Diet Education Consult   Day 1 of admit.  Christoph Taylor is a 57 y.o. male with admitting DX of Ketoacidosis, diabetic, type 2, no coma (HCC) [E11.10]  DKA, type 1, not at goal (HCC) [E10.10]    RD visited pt at bedside to provide diet education. Pt described feeling poorly and not agreeable to diet education at this time. RD left educational handouts at bedside and will follow up for diabetes diet teaching with pt prior to discharge.    RD following.

## 2022-07-29 NOTE — CONSULTS
Critical Care Consultation    Date of consult: 7/28/2022    Referring Physician  Adal Greenberg M.D.    Reason for Consultation  DKA and Hypotension    History of Presenting Illness  57 y.o. male who presented 7/28/2022 with DKA and hypotension, reportedly initially refusing intensive care but later agreeable.  He reports a week long history of feeling ill, vomiting (reports this is bloody), LUQ abdominal pain and constipation with severe fatigue so bad he was unable to care for himself.  He has a history of diabetes and HLD with chronic back pain.  He reports he fell and fractured his spine a week ago and that is what started all of this.  He is very tired and somewhat irritable and his timeline is not entirely clear.     Code Status  Full Code    Review of Systems  Review of Systems   Constitutional: Positive for malaise/fatigue.   HENT: Negative for congestion.    Respiratory: Positive for shortness of breath.    Cardiovascular: Negative for chest pain, palpitations and leg swelling.   Gastrointestinal: Positive for abdominal pain, constipation, heartburn, nausea and vomiting.   Genitourinary: Negative for dysuria.   Musculoskeletal: Positive for back pain and myalgias.   Neurological: Negative for dizziness and headaches.       Past Medical History   has a past medical history of Diabetes (HCC) and Hypercholesteremia.    Surgical History   has a past surgical history that includes other.    Family History  family history includes Heart Disease in his father.    Social History   reports that he has quit smoking. He has never used smokeless tobacco. He reports previous alcohol use. He reports previous drug use.    Medications  Home Medications     Reviewed by Fanny Rivera R.N. (Registered Nurse) on 07/28/22 at 1615  Med List Status: Complete   Medication Last Dose Status   aspirin EC (ECOTRIN) 81 MG Tablet Delayed Response FEW DAYS AGO Active   ibuprofen (MOTRIN) 200 MG Tab UNK Active   Insulin NPH  Isophane & Regular (NOVOLIN 70/30 FLEXPEN SC) FEW DAYS AGO Active   Multiple Vitamin (ONE-A-DAY MENS) Tab FEW DAYS AGO Active              Current Facility-Administered Medications   Medication Dose Route Frequency Provider Last Rate Last Admin   • senna-docusate (PERICOLACE or SENOKOT S) 8.6-50 MG per tablet 2 Tablet  2 Tablet Oral BID Adal Greenberg M.D.   2 Tablet at 07/28/22 1802    And   • polyethylene glycol/lytes (MIRALAX) PACKET 1 Packet  1 Packet Oral QDAY PRN Adal Greenberg M.D.        And   • magnesium hydroxide (MILK OF MAGNESIA) suspension 30 mL  30 mL Oral QDAY PRN Adal Greenberg M.D.        And   • bisacodyl (DULCOLAX) suppository 10 mg  10 mg Rectal QDAY PRN Adal Greenberg M.D.       • [Held by provider] enoxaparin (Lovenox) inj 40 mg  40 mg Subcutaneous DAILY AT 1800 Adal Greenberg M.D.       • acetaminophen (Tylenol) tablet 650 mg  650 mg Oral Q6HRS PRN Adal Greenberg M.D.       • labetalol (NORMODYNE/TRANDATE) injection 10 mg  10 mg Intravenous Q4HRS PRN Adal Greenberg M.D.       • ondansetron (ZOFRAN) syringe/vial injection 4 mg  4 mg Intravenous Q4HRS PRGENOVEVA Greenberg M.D.   4 mg at 07/28/22 1836   • ondansetron (ZOFRAN ODT) dispertab 4 mg  4 mg Oral Q4HRS PRGENOVEVA Greenberg M.D.       • promethazine (PHENERGAN) tablet 12.5-25 mg  12.5-25 mg Oral Q4HRS PRN Adal Greenberg M.D.       • promethazine (PHENERGAN) suppository 12.5-25 mg  12.5-25 mg Rectal Q4HRS PRGENOVEVA Greenberg M.D.       • prochlorperazine (COMPAZINE) injection 5-10 mg  5-10 mg Intravenous Q4HRS PRN Adal Greenberg M.D.       • [Held by provider] aspirin EC (ECOTRIN) tablet 81 mg  81 mg Oral DAILY Adal Greenberg M.D.       • Pharmacy Consult Request ...Pain Management Review 1 Each  1 Each Other PHARMACY TO DOSE Adal Greenberg M.D.       • oxyCODONE immediate-release (ROXICODONE) tablet 5 mg  5 mg Oral Q3HRS PRN Adal Greenberg M.D.   5 mg at 07/28/22 2779     Or   • oxyCODONE immediate release (ROXICODONE) tablet 10 mg  10 mg Oral Q3HRS PRN Adal Greenberg M.D.        Or   • HYDROmorphone (Dilaudid) injection 0.5 mg  0.5 mg Intravenous Q3HRS PRN Adal Greenberg M.D.       • D10%-0.45% NaCl infusion   Intravenous Continuous VALENTÍN Manley.O. 125 mL/hr at 07/28/22 2218 Rate Change at 07/28/22 2218   • MD ALERT-PHARMACY TO CONSULT FOR DKA MONITORING 1 Each  1 Each Other PRN Savi Ring M.D.       • magnesium sulfate IVPB premix 2 g  2 g Intravenous Once PRN Savi Ring M.D.        Or   • magnesium sulfate IVPB premix 4 g  4 g Intravenous Once PRN Savi Ring M.D.       • potassium phosphate 30 mmol in  mL ivpb  30 mmol Intravenous Once PRN Savi Ring M.D.        Or   • sodium phosphate 30 mmol in 1/2  mL ivpb  30 mmol Intravenous Once PRN Savi Ring M.D.       • Adult DKA potassium(K+) replacement scale  1 Each Intravenous Q4HRS Savi Ring M.D.   1 Each at 07/28/22 1900   • 1/2 NS infusion   Intravenous Continuous Savi Ring M.D.   Stopped at 07/28/22 1911   • D5 1/2 NS infusion   Intravenous Continuous Savi Ring M.D.   Stopped at 07/28/22 2103   • insulin regular (HumuLIN R) 100 Units in  mL Infusion for DKA  5 Units/hr Intravenous Continuous JOSY ManleyO. 5 mL/hr at 07/28/22 2218 5 Units/hr at 07/28/22 2218   • norepinephrine (Levophed) 8 mg in 250 mL NS infusion (premix)  0-30 mcg/min Intravenous Continuous Savi Ring M.D. 1.9 mL/hr at 07/28/22 2100 1 mcg/min at 07/28/22 2100   • pantoprazole (Protonix) injection 40 mg  40 mg Intravenous BID Savi Ring M.D.   40 mg at 07/28/22 2032       Allergies  No Known Allergies    Vital Signs last 24 hours  Temp:  [35.9 °C (96.7 °F)-36.3 °C (97.3 °F)] 36.1 °C (97 °F)  Pulse:  [79-98] 82  Resp:  [6-22] 15  BP: ()/(34-78) 127/67  SpO2:  [91 %-100 %] 100 %    Physical Exam  Physical Exam  Constitutional:       Appearance:  He is ill-appearing.   HENT:      Head: Normocephalic and atraumatic.      Mouth/Throat:      Mouth: Mucous membranes are dry.   Eyes:      Extraocular Movements: Extraocular movements intact.      Pupils: Pupils are equal, round, and reactive to light.   Cardiovascular:      Rate and Rhythm: Regular rhythm. Tachycardia present.      Heart sounds: Normal heart sounds.   Pulmonary:      Effort: Pulmonary effort is normal. No respiratory distress.      Breath sounds: Normal breath sounds. No wheezing or rales.   Abdominal:      General: Abdomen is flat. There is no distension.      Palpations: Abdomen is soft.      Tenderness: There is no abdominal tenderness.   Musculoskeletal:         General: No swelling, tenderness or deformity.   Skin:     General: Skin is warm and dry.   Neurological:      General: No focal deficit present.      Mental Status: He is oriented to person, place, and time.         Fluids    Intake/Output Summary (Last 24 hours) at 2022 2327  Last data filed at 2022 2300  Gross per 24 hour   Intake 8558.5 ml   Output 950 ml   Net 7608.5 ml       Laboratory  Recent Results (from the past 48 hour(s))   POCT glucose device results    Collection Time: 22 12:19 AM   Result Value Ref Range    POC Glucose, Blood 285 (H) 65 - 99 mg/dL   POCT glucose device results    Collection Time: 22  6:49 AM   Result Value Ref Range    POC Glucose, Blood 357 (H) 65 - 99 mg/dL   EKG (NOW)    Collection Time: 22  6:50 AM   Result Value Ref Range    Report       [facility]    Test Date:  2022  Pt Name:    BAILEE JACOBSON               Department: [facility]  MRN:        [id number]                      [facility]  Gender:     Male                         Technician: DEBBIE  :        1965                   Requested By:SHALINI KENNEDY  Order #:    [id number]                    Reading MD: SHALINI KENNEDY, DO    Measurements  Intervals                                 Axis  Rate:       92                           P:          84  MI:         160                          QRS:        89  QRSD:       92                           T:          46  QT:         385  QTc:        477    Interpretive Statements  Sinus rhythm  Borderline prolonged QT interval  Compared to ECG 11/09/2021 07:37:40  No significant changes  Electronically Signed On 7- 7:02:48 PDT by SHALINI KENNEDY DO     CBC w/ Differential    Collection Time: 07/28/22  6:55 AM   Result Value Ref Range    WBC 13.5 (H) 4.8 - 10.8 K/uL    RBC 5.43 4.70 - 6.10 M/uL    Hemoglobin 15.2 14.0 - 18.0 g/dL    Hematocrit 44.5 42.0 - 52.0 %    MCV 82.0 81.4 - 97.8 fL    MCH 28.0 27.0 - 33.0 pg    MCHC 34.2 33.7 - 35.3 g/dL    RDW 38.1 35.9 - 50.0 fL    Platelet Count 297 164 - 446 K/uL    MPV 8.8 (L) 9.0 - 12.9 fL    Neutrophils-Polys 86.80 (H) 44.00 - 72.00 %    Lymphocytes 6.80 (L) 22.00 - 41.00 %    Monocytes 5.50 0.00 - 13.40 %    Eosinophils 0.10 0.00 - 6.90 %    Basophils 0.10 0.00 - 1.80 %    Immature Granulocytes 0.70 0.00 - 0.90 %    Nucleated RBC 0.00 /100 WBC    Neutrophils (Absolute) 11.75 (H) 1.82 - 7.42 K/uL    Lymphs (Absolute) 0.92 (L) 1.00 - 4.80 K/uL    Monos (Absolute) 0.74 0.00 - 0.85 K/uL    Eos (Absolute) 0.01 0.00 - 0.51 K/uL    Baso (Absolute) 0.02 0.00 - 0.12 K/uL    Immature Granulocytes (abs) 0.09 0.00 - 0.11 K/uL    NRBC (Absolute) 0.00 K/uL   Complete Metabolic Panel (CMP)    Collection Time: 07/28/22  6:55 AM   Result Value Ref Range    Sodium 134 (L) 135 - 145 mmol/L    Potassium 4.4 3.6 - 5.5 mmol/L    Chloride 87 (L) 96 - 112 mmol/L    Co2 17 (L) 20 - 33 mmol/L    Anion Gap 30.0 (H) 7.0 - 16.0    Glucose 393 (H) 65 - 99 mg/dL    Bun 19 8 - 22 mg/dL    Creatinine 0.94 0.50 - 1.40 mg/dL    Calcium 8.4 8.4 - 10.2 mg/dL    AST(SGOT) 9 (L) 12 - 45 U/L    ALT(SGPT) 10 2 - 50 U/L    Alkaline Phosphatase 96 30 - 99 U/L    Total Bilirubin 0.6 0.1 - 1.5 mg/dL    Albumin 3.6 3.2 - 4.9 g/dL     Total Protein 6.3 6.0 - 8.2 g/dL    Globulin 2.7 1.9 - 3.5 g/dL    A-G Ratio 1.3 g/dL   Troponin    Collection Time: 07/28/22  6:55 AM   Result Value Ref Range    Troponin T 20 (H) 6 - 19 ng/L   BETA-HYDROXYBUTYRIC ACID    Collection Time: 07/28/22  6:55 AM   Result Value Ref Range    beta-Hydroxybutyric Acid >8.00 (H) 0.02 - 0.27 mmol/L   LIPASE    Collection Time: 07/28/22  6:55 AM   Result Value Ref Range    Lipase 8 7 - 58 U/L   ESTIMATED GFR    Collection Time: 07/28/22  6:55 AM   Result Value Ref Range    GFR (CKD-EPI) 94 >60 mL/min/1.73 m 2   HEMOGLOBIN A1C    Collection Time: 07/28/22  6:55 AM   Result Value Ref Range    Glycohemoglobin 13.7 (H) 4.0 - 5.6 %    Est Avg Glucose 346 mg/dL   LACTIC ACID    Collection Time: 07/28/22  8:52 AM   Result Value Ref Range    Lactic Acid 2.1 (H) 0.5 - 2.0 mmol/L   MAGNESIUM    Collection Time: 07/28/22  8:52 AM   Result Value Ref Range    Magnesium 1.9 1.5 - 2.5 mg/dL   PROCALCITONIN    Collection Time: 07/28/22  8:52 AM   Result Value Ref Range    Procalcitonin 0.09 <0.25 ng/mL   POCT glucose device results    Collection Time: 07/28/22 12:32 PM   Result Value Ref Range    POC Glucose, Blood 295 (H) 65 - 99 mg/dL   URINALYSIS    Collection Time: 07/28/22 12:53 PM    Specimen: Urine, Clean Catch   Result Value Ref Range    Color Yellow     Character Clear     Specific Gravity 1.020 <1.035    Ph 5.0 5.0 - 8.0    Glucose 500 (A) Negative mg/dL    Ketones >=80 (A) Negative mg/dL    Protein Negative Negative mg/dL    Bilirubin Small (A) Negative    Nitrite Negative Negative    Leukocyte Esterase Negative Negative    Occult Blood Negative Negative    Micro Urine Req see below    URINE DRUG SCREEN    Collection Time: 07/28/22 12:53 PM   Result Value Ref Range    Amphetamines Urine Negative Negative    Barbiturates Negative Negative    Benzodiazepines Negative Negative    Cocaine Metabolite Negative Negative    Methadone Negative Negative    Opiates Positive (A) Negative     Oxycodone Negative Negative    Phencyclidine -Pcp Negative Negative    Propoxyphene Negative Negative    Cannabinoid Metab Negative Negative   POCT glucose device results    Collection Time: 07/28/22  1:19 PM   Result Value Ref Range    POC Glucose, Blood 355 (H) 65 - 99 mg/dL   LACTIC ACID    Collection Time: 07/28/22  1:28 PM   Result Value Ref Range    Lactic Acid 2.0 0.5 - 2.0 mmol/L   CBC with Differential    Collection Time: 07/28/22  3:03 PM   Result Value Ref Range    WBC 11.8 (H) 4.8 - 10.8 K/uL    RBC 4.96 4.70 - 6.10 M/uL    Hemoglobin 13.8 (L) 14.0 - 18.0 g/dL    Hematocrit 40.8 (L) 42.0 - 52.0 %    MCV 82.3 81.4 - 97.8 fL    MCH 27.8 27.0 - 33.0 pg    MCHC 33.8 33.7 - 35.3 g/dL    RDW 38.5 35.9 - 50.0 fL    Platelet Count 260 164 - 446 K/uL    MPV 8.8 (L) 9.0 - 12.9 fL    Neutrophils-Polys 86.50 (H) 44.00 - 72.00 %    Lymphocytes 6.60 (L) 22.00 - 41.00 %    Monocytes 6.00 0.00 - 13.40 %    Eosinophils 0.00 0.00 - 6.90 %    Basophils 0.10 0.00 - 1.80 %    Immature Granulocytes 0.80 0.00 - 0.90 %    Nucleated RBC 0.00 /100 WBC    Neutrophils (Absolute) 10.23 (H) 1.82 - 7.42 K/uL    Lymphs (Absolute) 0.78 (L) 1.00 - 4.80 K/uL    Monos (Absolute) 0.71 0.00 - 0.85 K/uL    Eos (Absolute) 0.00 0.00 - 0.51 K/uL    Baso (Absolute) 0.01 0.00 - 0.12 K/uL    Immature Granulocytes (abs) 0.10 0.00 - 0.11 K/uL    NRBC (Absolute) 0.00 K/uL   Comp Metabolic Panel    Collection Time: 07/28/22  3:03 PM   Result Value Ref Range    Sodium 136 135 - 145 mmol/L    Potassium 4.0 3.6 - 5.5 mmol/L    Chloride 95 (L) 96 - 112 mmol/L    Co2 18 (L) 20 - 33 mmol/L    Anion Gap 23.0 (H) 7.0 - 16.0    Glucose 354 (H) 65 - 99 mg/dL    Bun 19 8 - 22 mg/dL    Creatinine 0.92 0.50 - 1.40 mg/dL    Calcium 7.9 (L) 8.4 - 10.2 mg/dL    AST(SGOT) 7 (L) 12 - 45 U/L    ALT(SGPT) 9 2 - 50 U/L    Alkaline Phosphatase 86 30 - 99 U/L    Total Bilirubin 0.5 0.1 - 1.5 mg/dL    Albumin 3.1 (L) 3.2 - 4.9 g/dL    Total Protein 5.6 (L) 6.0 - 8.2 g/dL     Globulin 2.5 1.9 - 3.5 g/dL    A-G Ratio 1.2 g/dL   Phosphorus    Collection Time: 07/28/22  3:03 PM   Result Value Ref Range    Phosphorus 3.4 2.5 - 4.5 mg/dL   Magnesium    Collection Time: 07/28/22  3:03 PM   Result Value Ref Range    Magnesium 2.0 1.5 - 2.5 mg/dL   ESTIMATED GFR    Collection Time: 07/28/22  3:03 PM   Result Value Ref Range    GFR (CKD-EPI) 96 >60 mL/min/1.73 m 2   POCT glucose device results    Collection Time: 07/28/22  3:57 PM   Result Value Ref Range    POC Glucose, Blood 289 (H) 65 - 99 mg/dL   POCT glucose device results    Collection Time: 07/28/22  4:59 PM   Result Value Ref Range    POC Glucose, Blood 263 (H) 65 - 99 mg/dL   POCT glucose device results    Collection Time: 07/28/22  6:04 PM   Result Value Ref Range    POC Glucose, Blood 201 (H) 65 - 99 mg/dL   POCT arterial blood gas device results    Collection Time: 07/28/22  6:12 PM   Result Value Ref Range    Ph 7.390 (L) 7.400 - 7.500    Pco2 41.1 (H) 26.0 - 37.0 mmHg    Po2 97 (H) 64 - 87 mmHg    Tco2 26 20 - 33 mmol/L    S02 97 93 - 99 %    Hco3 24.9 17.0 - 25.0 mmol/L    BE 0 -4 - 3 mmol/L    Body Temp 96.3 F degrees    Ph Temp Zohaib 7.409 7.400 - 7.500    Po2 Temp Cor 90 (H) 64 - 87 mmHg    Specimen Arterial     DelSys Other     LPM 2 lpm   POCT lactate device results    Collection Time: 07/28/22  6:12 PM   Result Value Ref Range    iStat Lactate 1.0 0.5 - 2.0 mmol/L   CBC WITHOUT DIFFERENTIAL    Collection Time: 07/28/22  6:38 PM   Result Value Ref Range    WBC 14.6 (H) 4.8 - 10.8 K/uL    RBC 4.82 4.70 - 6.10 M/uL    Hemoglobin 13.3 (L) 14.0 - 18.0 g/dL    Hematocrit 39.8 (L) 42.0 - 52.0 %    MCV 82.6 81.4 - 97.8 fL    MCH 27.6 27.0 - 33.0 pg    MCHC 33.4 (L) 33.7 - 35.3 g/dL    RDW 39.6 35.9 - 50.0 fL    Platelet Count 304 164 - 446 K/uL    MPV 8.6 (L) 9.0 - 12.9 fL   Basic Metabolic Panel (BMP)    Collection Time: 07/28/22  7:00 PM   Result Value Ref Range    Sodium 137 135 - 145 mmol/L    Potassium 3.4 (L) 3.6 - 5.5 mmol/L     Chloride 103 96 - 112 mmol/L    Co2 18 (L) 20 - 33 mmol/L    Glucose 140 (H) 65 - 99 mg/dL    Bun 13 8 - 22 mg/dL    Creatinine 0.54 0.50 - 1.40 mg/dL    Calcium 6.6 (LL) 8.4 - 10.2 mg/dL    Anion Gap 16.0 7.0 - 16.0   ESTIMATED GFR    Collection Time: 07/28/22  7:00 PM   Result Value Ref Range    GFR (CKD-EPI) 115 >60 mL/min/1.73 m 2   POCT glucose device results    Collection Time: 07/28/22  7:02 PM   Result Value Ref Range    POC Glucose, Blood 132 (H) 65 - 99 mg/dL       Imaging  CT-ABDOMEN-PELVIS WITH   Final Result         1. Diffuse wall thickening of the distal esophagus could relate to esophagitis, similar to prior.   2. Moderate amount of stool throughout the colon.   3. Horseshoe kidney. No hydronephrosis. No obstructive left renal calculus.          Assessment/Plan  * Ketoacidosis, diabetic, type 2, no coma (HCC)- (present on admission)  Assessment & Plan  Patient reports he has been unable to eat properly or take care of his diabetes due to feeling sick for 1 week  Plan:  - DKA protocol   -- Insulin gtt at 0.1 units/kg/hr   -- Do not Bolus insulin   -- Fluid resuscitation with 6 L LR   -- NS or LR @ 150ml/hr maintenance   -- When -260 Start D5 1/2 NS at 150ml/hr   -- When BG < 150 Start D10 at 100ml/hr   -- Aggressive electrolyte repletion - pharmacy repletion protocol   -- Stop insulin gtt if K is < 3.3 and replete, restart when K is > 3.3   -- BMP every 4 hours   -- Mag/Phos every 8 hours   -- Transition off insulin gtt with sub Q LONG ACTING insulin when CO2 > 17 and Gap < 14 x 2 consecutive BMPs   -- NPO until transitioned   -- Diabetic education   -- Diabetic diet once off insulin gtt     Hypotension due to hypovolemia  Assessment & Plan  Patient extremely dry on admission.  Most recent Echo from 2021 with normal cardiac function  Plan:  - Fluid resuscitate  - Levophed via peripheral IV for now  - Monitor for bleeding or signs of infection    Hematemesis  Assessment & Plan  Patient reported.   CT does show evidence of esophageal thickening and possible esophagitis  Plan:  - PPI BID  - Monitor for bleeding in ICU  - Repeat CBC stable    Leukocytosis- (present on admission)  Assessment & Plan  Likely 2/2 severe dehydration.    ~ UA with no indication of infection  ~ CT abdomen/pelvis without signs of intraabdominal infection or bleeding, some esophageal thickening noted  ~ Procal negative  ~ No left shift  Plan:  - Fluid resuscitation  - Monitor for signs of infection  - Viral PCR      Discussed patient condition and risk of morbidity and/or mortality with RN, RT and Pharmacy.    The patient remains critically ill.  Critical care time = 60 minutes in directly providing and coordinating critical care and extensive data review.  No time overlap and excludes procedures.    Savi Ring MD RD  Pulmonary and Critical Care    Available on [employer]

## 2022-07-29 NOTE — CARE PLAN
The patient is Watcher - Medium risk of patient condition declining or worsening    Shift Goals  Clinical Goals: Monitor glucose levels  Patient Goals: Eat food  Family Goals: Not present at this time    Progress made toward(s) clinical / shift goals:    Problem: Pain - Standard  Goal: Alleviation of pain or a reduction in pain to the patient’s comfort goal  Outcome: Progressing, PRN pain medication given with relief      Problem: Diabetes Management  Goal: Patient will achieve and maintain glucose in satisfactory range  Outcome: Progressing, blood glucose trending down      Problem: Knowledge Deficit - Diabetes  Goal: Patient will demonstrate knowledge of insulin injection, symptoms, and treatment of hypoglycemia and diet prior to discharge  Outcome: Progressing, educated about DM, pt verbalized understanding but states he needs help with ADLs and has been feeling depressed

## 2022-07-29 NOTE — ASSESSMENT & PLAN NOTE
HPI Comments: Pt reports NV started about 4 am, denies etoh, 1-2 loose stools, took OTC ar seltzer and orange juice with little relief    Patient is a 21 y.o. male presenting with vomiting. The history is provided by the patient. Vomiting    This is a new problem. The current episode started 6 to 12 hours ago. The problem occurs 5 to 10 times per day. The problem has not changed since onset. The emesis has an appearance of stomach contents. There has been no fever. Associated symptoms include myalgias. His pertinent negatives include no irritable bowel syndrome, no inflammatory bowel disease, no short gut syndrome, no bowel resection, no recent abdominal surgery, no malabsorption, no gastric bypass and no DM. History reviewed. No pertinent past medical history. History reviewed. No pertinent surgical history. History reviewed. No pertinent family history. Social History     Social History    Marital status: SINGLE     Spouse name: N/A    Number of children: N/A    Years of education: N/A     Occupational History    Not on file. Social History Main Topics    Smoking status: Never Smoker    Smokeless tobacco: Never Used    Alcohol use No    Drug use: No    Sexual activity: Not on file     Other Topics Concern    Not on file     Social History Narrative    No narrative on file         ALLERGIES: Review of patient's allergies indicates no known allergies. Review of Systems   Gastrointestinal: Positive for vomiting. Musculoskeletal: Positive for myalgias. All other systems reviewed and are negative. Vitals:    11/23/17 1436   BP: 132/84   Pulse: 100   Resp: 16   Temp: 100.4 °F (38 °C)   SpO2: 98%   Weight: 127 kg (280 lb)   Height: 6' (1.829 m)            Physical Exam   Constitutional: He is oriented to person, place, and time. He appears well-developed and well-nourished. No distress. obese   HENT:   Head: Normocephalic and atraumatic.    Eyes: Conjunctivae and EOM are Likely 2/2 severe dehydration.    ~ UA with no indication of infection  ~ CT abdomen/pelvis without signs of intraabdominal infection or bleeding, some esophageal thickening noted  ~ Procal negative  ~ No left shift  Plan:  - Fluid resuscitation  - Monitor for signs of infection  - Viral PCR   normal. Pupils are equal, round, and reactive to light. Neck: Normal range of motion. Neck supple. Cardiovascular: Normal rate and regular rhythm. Pulmonary/Chest: Effort normal and breath sounds normal. No respiratory distress. He has no wheezes. Abdominal: Soft. Bowel sounds are normal. There is tenderness. Mild upper abd area pain to palpation no guarding or masses + bs    Musculoskeletal: Normal range of motion. He exhibits no edema. Neurological: He is alert and oriented to person, place, and time. Skin: Skin is warm. Nursing note and vitals reviewed.        MDM  Number of Diagnoses or Management Options  Diagnosis management comments: Cbc, cmp, lipase, urine and abd series negative  Pt feeling better post zofran and ns for viral illness         Amount and/or Complexity of Data Reviewed  Clinical lab tests: reviewed and ordered  Tests in the radiology section of CPT®: ordered and reviewed  Review and summarize past medical records: yes    Risk of Complications, Morbidity, and/or Mortality  Presenting problems: moderate  Diagnostic procedures: moderate  Management options: low    Patient Progress  Patient progress: improved    ED Course       Procedures

## 2022-07-29 NOTE — CARE PLAN
The patient is Watcher - Medium risk of patient condition declining or worsening    Shift Goals  Clinical Goals: Monitor electrolytes and blood sugars  Patient Goals: eat  Family Goals: vicente    Progress made toward(s) clinical / shift goals:    Problem: Knowledge Deficit - Standard  Goal: Patient and family/care givers will demonstrate understanding of plan of care, disease process/condition, diagnostic tests and medications  Outcome: Progressing  Note: Continue to provide patient education each shift.     Problem: Pain - Standard  Goal: Alleviation of pain or a reduction in pain to the patient’s comfort goal  Outcome: Met  Note: Current PRN's effective in keeping pain level within patient's goal of less than 4/10     Problem: Skin Integrity  Goal: Skin integrity is maintained or improved  Outcome: Met  Note: Skin integrity to be maintained through frequent turns. Daily baths.       Patient is not progressing towards the following goals:      Problem: Knowledge Deficit - Diabetes  Goal: Patient will demonstrate knowledge of insulin injection, symptoms, and treatment of hypoglycemia and diet prior to discharge  Outcome: Not Met  Note: Continue to provide education to patient each shift.

## 2022-07-29 NOTE — ASSESSMENT & PLAN NOTE
Patient reports he has been unable to eat properly or take care of his diabetes due to feeling sick for 1 week  Plan:  - DKA protocol   -- Insulin gtt at 0.1 units/kg/hr   -- Do not Bolus insulin   -- Fluid resuscitation with 6 L LR   -- NS or LR @ 150ml/hr maintenance   -- When -260 Start D5 1/2 NS at 150ml/hr   -- When BG < 150 Start D10 at 100ml/hr   -- Aggressive electrolyte repletion - pharmacy repletion protocol   -- Stop insulin gtt if K is < 3.3 and replete, restart when K is > 3.3   -- BMP every 4 hours   -- Mag/Phos every 8 hours   -- Transition off insulin gtt with sub Q LONG ACTING insulin when CO2 > 17 and Gap < 14 x 2 consecutive BMPs   -- NPO until transitioned   -- Diabetic education   -- Diabetic diet once off insulin gtt

## 2022-07-29 NOTE — ASSESSMENT & PLAN NOTE
Patient extremely dry on admission.  Most recent Echo from 2021 with normal cardiac function  Plan:  - Fluid resuscitate  - Levophed via peripheral IV for now  - Monitor for bleeding or signs of infection

## 2022-07-29 NOTE — PROGRESS NOTES
Critical Care Progress Note    Date of admission  7/28/2022    Chief Complaint  57 y.o. male admitted 7/28/2022 with DKA    Hospital Course  57 y.o. male who presented 7/28/2022 with DKA and hypotension, reportedly initially refusing intensive care but later agreeable.  He reports a week long history of feeling ill, vomiting (reports this is bloody), LUQ abdominal pain and constipation with severe fatigue so bad he was unable to care for himself.  He has a history of diabetes and HLD with chronic back pain.  He reports he fell and fractured his spine a week ago and that is what started all of this.  He is very tired and somewhat irritable and his timeline is not entirely clear.     Interval Problem Update  Reviewed last 24 hour events:  Gap closed  Transitioned off insulin  Subsequent BMP gap still closed  Patient with ongoing abdominal pain, no NV, only esophagitis on CT, normal BM, negative ACS workup so far  Transfer out of unit.     Review of Systems  Review of Systems   Constitutional: Negative for chills and fever.   HENT: Negative for congestion.    Respiratory: Negative for cough, sputum production and shortness of breath.    Cardiovascular: Negative for chest pain, palpitations and leg swelling.   Gastrointestinal: Positive for abdominal pain and nausea. Negative for heartburn and vomiting.   Genitourinary: Negative for dysuria.   Musculoskeletal: Negative for myalgias.   Neurological: Negative for dizziness and headaches.   Psychiatric/Behavioral: Positive for depression.        Vital Signs for last 24 hours   Temp:  [35.9 °C (96.7 °F)-36.3 °C (97.4 °F)] 36 °C (96.8 °F)  Pulse:  [70-98] 76  Resp:  [6-22] 17  BP: ()/(34-90) 107/68  SpO2:  [91 %-100 %] 97 %    Hemodynamic parameters for last 24 hours       Respiratory Information for the last 24 hours       Physical Exam   Physical Exam  Constitutional:       Appearance: He is normal weight. He is ill-appearing.   HENT:      Head: Normocephalic and  atraumatic.      Mouth/Throat:      Mouth: Mucous membranes are dry.   Eyes:      Extraocular Movements: Extraocular movements intact.      Pupils: Pupils are equal, round, and reactive to light.   Cardiovascular:      Rate and Rhythm: Normal rate and regular rhythm.      Heart sounds: Normal heart sounds.   Pulmonary:      Effort: Pulmonary effort is normal. No respiratory distress.      Breath sounds: Normal breath sounds. No wheezing or rales.   Abdominal:      General: Abdomen is flat.      Palpations: Abdomen is soft.   Musculoskeletal:         General: No swelling, tenderness or deformity.   Skin:     General: Skin is warm and dry.   Neurological:      General: No focal deficit present.      Mental Status: He is oriented to person, place, and time.         Medications  Current Facility-Administered Medications   Medication Dose Route Frequency Provider Last Rate Last Admin   • insulin regular (HumuLIN R,NovoLIN R) injection  2-9 Units Subcutaneous 4X/DAY TERESA Ring M.D.        And   • dextrose 10 % BOLUS 25 g  25 g Intravenous Q15 MIN PRN Savi Ring M.D.       • potassium chloride SA (Kdur) tablet 40 mEq  40 mEq Oral DAILY Savi Ring M.D.   40 mEq at 07/29/22 1216   • senna-docusate (PERICOLACE or SENOKOT S) 8.6-50 MG per tablet 2 Tablet  2 Tablet Oral BID Adal Greenberg M.D.   2 Tablet at 07/29/22 0508    And   • polyethylene glycol/lytes (MIRALAX) PACKET 1 Packet  1 Packet Oral QDAY PRN Adal Greenberg M.D.        And   • magnesium hydroxide (MILK OF MAGNESIA) suspension 30 mL  30 mL Oral QDAY PRN Adal Greenberg M.D.        And   • bisacodyl (DULCOLAX) suppository 10 mg  10 mg Rectal QDAY PRN Adal Greenberg M.D.       • [Held by provider] enoxaparin (Lovenox) inj 40 mg  40 mg Subcutaneous DAILY AT 1800 Adal Greenberg M.D.       • acetaminophen (Tylenol) tablet 650 mg  650 mg Oral Q6HRS PRN Adal Greenberg M.D.       • labetalol (NORMODYNE/TRANDATE)  injection 10 mg  10 mg Intravenous Q4HRS PRGENOVEVA Greenberg M.D.       • ondansetron (ZOFRAN) syringe/vial injection 4 mg  4 mg Intravenous Q4HRS PRGENOVEVA Greenberg M.D.   4 mg at 07/29/22 0502   • ondansetron (ZOFRAN ODT) dispertab 4 mg  4 mg Oral Q4HRS PRN Adal Greenberg M.D.       • promethazine (PHENERGAN) tablet 12.5-25 mg  12.5-25 mg Oral Q4HRS PRN Adal Greenberg M.D.   12.5 mg at 07/29/22 1216   • promethazine (PHENERGAN) suppository 12.5-25 mg  12.5-25 mg Rectal Q4HRS PRGENOVEVA Greenberg M.D.       • prochlorperazine (COMPAZINE) injection 5-10 mg  5-10 mg Intravenous Q4HRS PRGENOVEVA Greenberg M.D.   10 mg at 07/29/22 1010   • [Held by provider] aspirin EC (ECOTRIN) tablet 81 mg  81 mg Oral DAILY Adal Greenberg M.D.       • Pharmacy Consult Request ...Pain Management Review 1 Each  1 Each Other PHARMACY TO DOSE Adal Greenberg M.D.       • oxyCODONE immediate-release (ROXICODONE) tablet 5 mg  5 mg Oral Q3HRS PRGENOVEVA Greenberg M.D.   5 mg at 07/29/22 0508    Or   • oxyCODONE immediate release (ROXICODONE) tablet 10 mg  10 mg Oral Q3HRS PRGENOVEVA Greenberg M.D.        Or   • HYDROmorphone (Dilaudid) injection 0.5 mg  0.5 mg Intravenous Q3HRS PRGENOVEVA Greenberg M.D.       • pantoprazole (Protonix) injection 40 mg  40 mg Intravenous BID Savi Ring M.D.   40 mg at 07/29/22 0508       Fluids    Intake/Output Summary (Last 24 hours) at 7/29/2022 1259  Last data filed at 7/29/2022 1000  Gross per 24 hour   Intake 42385.8 ml   Output 1450 ml   Net 8569.8 ml       Laboratory  Recent Labs     07/28/22  1812   ISTATAPH 7.390*   ISTATAPCO2 41.1*   ISTATAPO2 97*   ISTATATCO2 26   [id number] 97   ISTATARTHCO3 24.9   ISTATARTBE 0   ISTATTEMP 96.3 F   ISTATSPEC Arterial   ISTATAPHTC 7.409   [id number] 90*         Recent Labs     07/28/22  2340 07/29/22  0300 07/29/22  0620 07/29/22  1022   SODIUM 136 137 136 134*   POTASSIUM 3.7 3.6 3.5* 3.5*   CHLORIDE 101 102 102  100   CO2 27 26 26 26   BUN 13 12 10 8   CREATININE 0.65 0.62 0.62 0.58   MAGNESIUM 1.7 2.1 2.0  --    PHOSPHORUS 1.4* 2.4* 3.0  --    CALCIUM 8.2* 8.0* 7.7* 7.9*     Recent Labs     07/26/22  1421 07/28/22  0655 07/28/22  1503 07/28/22  1900 07/29/22  0300 07/29/22  0620 07/29/22  1022   ALTSGPT 13 10 9  --   --   --   --    ASTSGOT 14 9* 7*  --   --   --   --    ALKPHOSPHAT 95 96 86  --   --   --   --    TBILIRUBIN 0.7 0.6 0.5  --   --   --   --    LIPASE 9* 8  --   --   --   --   --    GLUCOSE 335* 393* 354*   < > 91 146* 166*    < > = values in this interval not displayed.     Recent Labs     07/26/22 1421 07/28/22  0655 07/28/22  1503 07/28/22  1838 07/29/22  0300   WBC 10.5 13.5* 11.8* 14.6* 11.8*   NEUTSPOLYS 87.30* 86.80* 86.50*  --   --    LYMPHOCYTES 8.20* 6.80* 6.60*  --   --    MONOCYTES 4.00 5.50 6.00  --   --    EOSINOPHILS 0.10 0.10 0.00  --   --    BASOPHILS 0.10 0.10 0.10  --   --    ASTSGOT 14 9* 7*  --   --    ALTSGPT 13 10 9  --   --    ALKPHOSPHAT 95 96 86  --   --    TBILIRUBIN 0.7 0.6 0.5  --   --      Recent Labs     07/28/22  1503 07/28/22  1838 07/29/22  0300   RBC 4.96 4.82 4.44*   HEMOGLOBIN 13.8* 13.3* 12.5*   HEMATOCRIT 40.8* 39.8* 36.6*   PLATELETCT 260 304 211       Imaging  CT:    Reviewed    Assessment/Plan  * Ketoacidosis, diabetic, type 2, no coma (HCC)- (present on admission)  Assessment & Plan  Patient reports he has been unable to eat properly or take care of his diabetes due to feeling sick for 1 week  Plan:  - DKA protocol   -- Insulin gtt at 0.1 units/kg/hr   -- Do not Bolus insulin   -- Fluid resuscitation with 6 L LR   -- NS or LR @ 150ml/hr maintenance   -- When -260 Start D5 1/2 NS at 150ml/hr   -- When BG < 150 Start D10 at 100ml/hr   -- Aggressive electrolyte repletion - pharmacy repletion protocol   -- Stop insulin gtt if K is < 3.3 and replete, restart when K is > 3.3   -- BMP every 4 hours   -- Mag/Phos every 8 hours   -- Transition off insulin gtt with sub Q  LONG ACTING insulin when CO2 > 17 and Gap < 14 x 2 consecutive BMPs   -- NPO until transitioned   -- Diabetic education   -- Diabetic diet once off insulin gtt     Hypotension due to hypovolemia  Assessment & Plan  Patient extremely dry on admission.  Most recent Echo from 2021 with normal cardiac function  Plan:  - Fluid resuscitate  - Levophed via peripheral IV for now  - Monitor for bleeding or signs of infection    Hematemesis  Assessment & Plan  Patient reported.  CT does show evidence of esophageal thickening and possible esophagitis  Plan:  - PPI BID  - Monitor for bleeding in ICU  - Repeat CBC stable    Leukocytosis- (present on admission)  Assessment & Plan  Likely 2/2 severe dehydration.    ~ UA with no indication of infection  ~ CT abdomen/pelvis without signs of intraabdominal infection or bleeding, some esophageal thickening noted  ~ Procal negative  ~ No left shift  Plan:  - Fluid resuscitation  - Monitor for signs of infection  - Viral PCR         VTE:  On hold 2/2 reported hematemesis   Ulcer: PPI  Lines: None    I have performed a physical exam and reviewed and updated ROS and Plan today (7/29/2022). In review of yesterday's note (7/28/2022), there are no changes except as documented above.     Discussed patient condition and risk of morbidity and/or mortality with RN, RT and Pharmacy  The patient remains critically ill.  Critical care time = 60 minutes in directly providing and coordinating critical care and extensive data review.  No time overlap and excludes procedures.    Savi Ring MD RD  Pulmonary and Critical Care    Available on Voalte

## 2022-07-29 NOTE — PROGRESS NOTES
12-hour chart check complete.    Monitor Summary  Rhythm: SR  Rate: 70-80's  Ectopy: rPAC, oPVC  Measurements: 0.20/0.10/0.36

## 2022-07-29 NOTE — PROGRESS NOTES
12-hour chart check complete.    Monitor Summary  Rhythm: SR   Rate: 80-98  Ectopy: Rare PVCs  Measurements: 0.16/0.10/0.34

## 2022-07-29 NOTE — ASSESSMENT & PLAN NOTE
Patient reported.  CT does show evidence of esophageal thickening and possible esophagitis  Plan:  - PPI BID  - Monitor for bleeding in ICU  - Repeat CBC stable

## 2022-07-29 NOTE — PROGRESS NOTES
4 Eyes Skin Assessment Completed by PENNIE Escobedo and PENNIE Grullon.    Head WDL  Ears WDL  Nose WDL  Mouth WDL  Neck WDL  Breast/Chest Redness  Shoulder Blades WDL  Spine WDL  (R) Arm/Elbow/Hand Redness, Blanching, Bruising, Scab, Discoloration and Scar  (L) Arm/Elbow/Hand Redness, Blanching, Abrasion, Scab and Discoloration  Abdomen WDL  Groin WDL  Scrotum/Coccyx/Buttocks WDL  (R) Leg Scab  (L) Leg Scab  (R) Heel/Foot/Toe Blanching and Boggy  (L) Heel/Foot/Toe Blanching and Boggy          Devices In Places ECG, Blood Pressure Cuff, Pulse Ox and Nasal Cannula      Interventions In Place Pillows, Low Air Loss Mattress and Pressure Redistribution Mattress    Possible Skin Injury No    Pictures Uploaded Into Epic N/A  Wound Consult Placed N/A  RN Wound Prevention Protocol Ordered No

## 2022-07-29 NOTE — PROGRESS NOTES
Received report from PENNIE Arriaga. Pt A&Ox4. SBA from [facility] to ICU bed. No gtts running upon arrival, pt received 4L IV fluids in ED as well as Pain medications. Updated pt on plan of care, pt verbalized understanding. VSS at this time. Call light within reach all safety precautions in place. Will continue to monitor

## 2022-07-29 NOTE — CARE PLAN
The patient is Watcher - Medium risk of patient condition declining or worsening       Shift Goals  Clinical Goals: Transition off DKA protocol, Diet order  Patient Goals: Eat food  Family Goals: Not present    Progress made toward(s) clinical / shift goals:    Problem: Diabetes Management  Goal: Patient will achieve and maintain glucose in satisfactory range  Outcome: Progressing, transitioning off of DKA protocol, blood sugars stable      Problem: Fluid Balance or Risk for Fluid Volume Deficit  Goal: Patient will demonstrate adequate hydration and vital signs  Outcome: Progressing, adequate fluid intake throughout the day      Problem: Nutrition Deficit - Diabetes  Goal: Patient will demonstrate adequate hydration and vital signs  Outcome: Progressing, transitioned from DKA protocol and to Diabetic Diet

## 2022-07-29 NOTE — PROGRESS NOTES
Received report from PENNIE Delaney. Pt is A&Ox4 but drowsy. Verified gtts , see MAR. VSS at this time. Update patient on plan of care. Pt verbalized understanding. All needs met at this time. Call light and safety precautions in place.

## 2022-07-30 LAB
ANION GAP SERPL CALC-SCNC: 12 MMOL/L (ref 7–16)
ANION GAP SERPL CALC-SCNC: 8 MMOL/L (ref 7–16)
ANION GAP SERPL CALC-SCNC: 8 MMOL/L (ref 7–16)
BUN SERPL-MCNC: 7 MG/DL (ref 8–22)
BUN SERPL-MCNC: 8 MG/DL (ref 8–22)
BUN SERPL-MCNC: 8 MG/DL (ref 8–22)
CALCIUM SERPL-MCNC: 7.7 MG/DL (ref 8.4–10.2)
CALCIUM SERPL-MCNC: 7.8 MG/DL (ref 8.4–10.2)
CALCIUM SERPL-MCNC: 8 MG/DL (ref 8.4–10.2)
CHLORIDE SERPL-SCNC: 100 MMOL/L (ref 96–112)
CHLORIDE SERPL-SCNC: 102 MMOL/L (ref 96–112)
CHLORIDE SERPL-SCNC: 97 MMOL/L (ref 96–112)
CO2 SERPL-SCNC: 25 MMOL/L (ref 20–33)
CO2 SERPL-SCNC: 28 MMOL/L (ref 20–33)
CO2 SERPL-SCNC: 28 MMOL/L (ref 20–33)
CREAT SERPL-MCNC: 0.52 MG/DL (ref 0.5–1.4)
CREAT SERPL-MCNC: 0.55 MG/DL (ref 0.5–1.4)
CREAT SERPL-MCNC: 0.58 MG/DL (ref 0.5–1.4)
ERYTHROCYTE [DISTWIDTH] IN BLOOD BY AUTOMATED COUNT: 41.5 FL (ref 35.9–50)
GFR SERPLBLD CREATININE-BSD FMLA CKD-EPI: 113 ML/MIN/1.73 M 2
GFR SERPLBLD CREATININE-BSD FMLA CKD-EPI: 115 ML/MIN/1.73 M 2
GFR SERPLBLD CREATININE-BSD FMLA CKD-EPI: 117 ML/MIN/1.73 M 2
GLUCOSE BLD STRIP.AUTO-MCNC: 139 MG/DL (ref 65–99)
GLUCOSE BLD STRIP.AUTO-MCNC: 230 MG/DL (ref 65–99)
GLUCOSE BLD STRIP.AUTO-MCNC: 232 MG/DL (ref 65–99)
GLUCOSE BLD STRIP.AUTO-MCNC: 318 MG/DL (ref 65–99)
GLUCOSE SERPL-MCNC: 122 MG/DL (ref 65–99)
GLUCOSE SERPL-MCNC: 149 MG/DL (ref 65–99)
GLUCOSE SERPL-MCNC: 232 MG/DL (ref 65–99)
HCT VFR BLD AUTO: 38.6 % (ref 42–52)
HGB BLD-MCNC: 12.6 G/DL (ref 14–18)
MCH RBC QN AUTO: 27.5 PG (ref 27–33)
MCHC RBC AUTO-ENTMCNC: 32.6 G/DL (ref 33.7–35.3)
MCV RBC AUTO: 84.1 FL (ref 81.4–97.8)
PLATELET # BLD AUTO: 222 K/UL (ref 164–446)
PMV BLD AUTO: 8.9 FL (ref 9–12.9)
POTASSIUM SERPL-SCNC: 3.6 MMOL/L (ref 3.6–5.5)
POTASSIUM SERPL-SCNC: 3.6 MMOL/L (ref 3.6–5.5)
POTASSIUM SERPL-SCNC: 3.9 MMOL/L (ref 3.6–5.5)
RBC # BLD AUTO: 4.59 M/UL (ref 4.7–6.1)
SODIUM SERPL-SCNC: 134 MMOL/L (ref 135–145)
SODIUM SERPL-SCNC: 136 MMOL/L (ref 135–145)
SODIUM SERPL-SCNC: 138 MMOL/L (ref 135–145)
WBC # BLD AUTO: 8.2 K/UL (ref 4.8–10.8)

## 2022-07-30 PROCEDURE — 94760 N-INVAS EAR/PLS OXIMETRY 1: CPT

## 2022-07-30 PROCEDURE — 700111 HCHG RX REV CODE 636 W/ 250 OVERRIDE (IP): Performed by: INTERNAL MEDICINE

## 2022-07-30 PROCEDURE — 80048 BASIC METABOLIC PNL TOTAL CA: CPT | Mod: 91

## 2022-07-30 PROCEDURE — A9270 NON-COVERED ITEM OR SERVICE: HCPCS | Performed by: INTERNAL MEDICINE

## 2022-07-30 PROCEDURE — C9113 INJ PANTOPRAZOLE SODIUM, VIA: HCPCS | Performed by: INTERNAL MEDICINE

## 2022-07-30 PROCEDURE — 85027 COMPLETE CBC AUTOMATED: CPT

## 2022-07-30 PROCEDURE — 82962 GLUCOSE BLOOD TEST: CPT

## 2022-07-30 PROCEDURE — 700102 HCHG RX REV CODE 250 W/ 637 OVERRIDE(OP): Performed by: INTERNAL MEDICINE

## 2022-07-30 PROCEDURE — 770020 HCHG ROOM/CARE - TELE (206)

## 2022-07-30 PROCEDURE — 99233 SBSQ HOSP IP/OBS HIGH 50: CPT | Performed by: HOSPITALIST

## 2022-07-30 RX ADMIN — PANTOPRAZOLE SODIUM 40 MG: 40 INJECTION, POWDER, FOR SOLUTION INTRAVENOUS at 05:07

## 2022-07-30 RX ADMIN — INSULIN HUMAN 6 UNITS: 100 INJECTION, SOLUTION PARENTERAL at 21:36

## 2022-07-30 RX ADMIN — OXYCODONE HYDROCHLORIDE 10 MG: 10 TABLET ORAL at 12:07

## 2022-07-30 RX ADMIN — OXYCODONE HYDROCHLORIDE 5 MG: 5 TABLET ORAL at 21:12

## 2022-07-30 RX ADMIN — ONDANSETRON 4 MG: 2 INJECTION INTRAMUSCULAR; INTRAVENOUS at 12:07

## 2022-07-30 RX ADMIN — PANTOPRAZOLE SODIUM 40 MG: 40 INJECTION, POWDER, FOR SOLUTION INTRAVENOUS at 16:30

## 2022-07-30 RX ADMIN — INSULIN HUMAN 3 UNITS: 100 INJECTION, SOLUTION PARENTERAL at 16:30

## 2022-07-30 RX ADMIN — POTASSIUM CHLORIDE 40 MEQ: 1500 TABLET, EXTENDED RELEASE ORAL at 05:07

## 2022-07-30 RX ADMIN — INSULIN HUMAN 3 UNITS: 100 INJECTION, SOLUTION PARENTERAL at 11:47

## 2022-07-30 ASSESSMENT — COGNITIVE AND FUNCTIONAL STATUS - GENERAL
DRESSING REGULAR LOWER BODY CLOTHING: A LITTLE
HELP NEEDED FOR BATHING: A LITTLE
TOILETING: A LITTLE
SUGGESTED CMS G CODE MODIFIER MOBILITY: CJ
MOBILITY SCORE: 21
SUGGESTED CMS G CODE MODIFIER DAILY ACTIVITY: CJ
CLIMB 3 TO 5 STEPS WITH RAILING: A LITTLE
DAILY ACTIVITIY SCORE: 21
WALKING IN HOSPITAL ROOM: A LITTLE
STANDING UP FROM CHAIR USING ARMS: A LITTLE

## 2022-07-30 ASSESSMENT — PAIN DESCRIPTION - PAIN TYPE: TYPE: ACUTE PAIN

## 2022-07-30 NOTE — DIETARY
Nutrition Services - Brief Update  RD attempted follow-up diet education for diabetes w/ pt x 2 this date. Pt declined x2, states still feeling poorly. Diet ed materials left at bedside 7/29 per RD note.    PLAN/RECOMMEND - RD will follow up w/ pt for diabetes diet education as able prior to discharge.

## 2022-07-30 NOTE — PROGRESS NOTES
Hospital Medicine Daily Progress Note    Date of Service  7/30/2022    Chief Complaint  Christoph Taylor is a 57 y.o. male admitted 7/28/2022 with diabetic ketoacidosis    Hospital Course  No notes on file    Interval Problem Update  No acute overnight events.  Tolerating diet well.  Sugars well controlled.  Patient reporting abdominal discomfort as residual symptoms.    I have discussed this patient's plan of care and discharge plan at IDT rounds today with Case Management, Nursing, Nursing leadership, and other members of the IDT team.    Consultants/Specialty  Critical care medicine, they have signed off as of 7/29/2022.    Code Status  Full Code    Disposition  Patient is not medically cleared for discharge.   Anticipate discharge to to home with close outpatient follow-up.  I have placed the appropriate orders for post-discharge needs.    Review of Systems  All systems reviewed and negative except as noted per above.    Physical Exam  Temp:  [36.2 °C (97.2 °F)-36.8 °C (98.3 °F)] 36.3 °C (97.4 °F)  Pulse:  [69-92] 92  Resp:  [18-20] 20  BP: ()/(54-85) 135/81  SpO2:  [92 %-99 %] 99 %    General: No acute distress  HEENT atraumatic, normocephalic, pupils equal round reactive to light  Neck: No JVD  Chest: Respirations are unlabored  Cardiac: Physiologic S1 and S2  Abdomen: Soft, nontender, nondistended  Extremities: Without clubbing, cyanosis or edema  Neuro: Cranial nerves II through XII are grossly intact.  Psych: No anxiety, judgement intact.        Current Facility-Administered Medications:   •  insulin regular (HumuLIN R,NovoLIN R) injection, 2-9 Units, Subcutaneous, 4X/DAY ACHS, 3 Units at 07/30/22 1147 **AND** POC blood glucose manual result, , , Q AC AND BEDTIME(S) **AND** NOTIFY MD and PharmD, , , Once **AND** Administer 20 grams of glucose (approximately 8 ounces of fruit juice) every 15 minutes PRN FSBG less than 70 mg/dL, , , PRN **AND** dextrose 10 % BOLUS 25 g, 25 g, Intravenous, Q15 MIN PRN,  Savi Ring M.D.  •  potassium chloride SA (Kdur) tablet 40 mEq, 40 mEq, Oral, DAILY, Savi Ring M.D., 40 mEq at 07/30/22 0507  •  senna-docusate (PERICOLACE or SENOKOT S) 8.6-50 MG per tablet 2 Tablet, 2 Tablet, Oral, BID, 2 Tablet at 07/29/22 0508 **AND** polyethylene glycol/lytes (MIRALAX) PACKET 1 Packet, 1 Packet, Oral, QDAY PRN **AND** magnesium hydroxide (MILK OF MAGNESIA) suspension 30 mL, 30 mL, Oral, QDAY PRN **AND** bisacodyl (DULCOLAX) suppository 10 mg, 10 mg, Rectal, QDAY PRN, Adal Greenberg M.D.  •  [Held by provider] enoxaparin (Lovenox) inj 40 mg, 40 mg, Subcutaneous, DAILY AT 1800, Adal Greenberg M.D.  •  acetaminophen (Tylenol) tablet 650 mg, 650 mg, Oral, Q6HRS PRN, Adal Greenberg M.D.  •  labetalol (NORMODYNE/TRANDATE) injection 10 mg, 10 mg, Intravenous, Q4HRS PRN, Adal Greenberg M.D.  •  ondansetron (ZOFRAN) syringe/vial injection 4 mg, 4 mg, Intravenous, Q4HRS PRN, Adal Greenberg M.D., 4 mg at 07/30/22 1207  •  ondansetron (ZOFRAN ODT) dispertab 4 mg, 4 mg, Oral, Q4HRS PRN, Adal Greenberg M.D.  •  promethazine (PHENERGAN) tablet 12.5-25 mg, 12.5-25 mg, Oral, Q4HRS PRN, Adal Greenberg M.D., 12.5 mg at 07/29/22 1216  •  promethazine (PHENERGAN) suppository 12.5-25 mg, 12.5-25 mg, Rectal, Q4HRS PRN, Adal Greenberg M.D.  •  prochlorperazine (COMPAZINE) injection 5-10 mg, 5-10 mg, Intravenous, Q4HRS PRN, Adal Greenberg M.D., 10 mg at 07/29/22 1010  •  [Held by provider] aspirin EC (ECOTRIN) tablet 81 mg, 81 mg, Oral, DAILY, Adal Greenberg M.D.  •  Pharmacy Consult Request ...Pain Management Review 1 Each, 1 Each, Other, PHARMACY TO DOSE, Adal Greenberg M.D.  •  oxyCODONE immediate-release (ROXICODONE) tablet 5 mg, 5 mg, Oral, Q3HRS PRN, 5 mg at 07/29/22 0508 **OR** oxyCODONE immediate release (ROXICODONE) tablet 10 mg, 10 mg, Oral, Q3HRS PRN, 10 mg at 07/30/22 1207 **OR** HYDROmorphone (Dilaudid) injection 0.5 mg, 0.5 mg,  Intravenous, Q3HRS PRN, Adal Greenberg M.D.  •  pantoprazole (Protonix) injection 40 mg, 40 mg, Intravenous, BID, Savi Ring M.D., 40 mg at 07/30/22 0507      Fluids    Intake/Output Summary (Last 24 hours) at 7/30/2022 1429  Last data filed at 7/30/2022 1203  Gross per 24 hour   Intake --   Output 1800 ml   Net -1800 ml       Laboratory  Recent Labs     07/28/22  1838 07/29/22  0300 07/30/22  0149   WBC 14.6* 11.8* 8.2   RBC 4.82 4.44* 4.59*   HEMOGLOBIN 13.3* 12.5* 12.6*   HEMATOCRIT 39.8* 36.6* 38.6*   MCV 82.6 82.4 84.1   MCH 27.6 28.2 27.5   MCHC 33.4* 34.2 32.6*   RDW 39.6 39.8 41.5   PLATELETCT 304 211 222   MPV 8.6* 9.0 8.9*     Recent Labs     07/30/22  0149 07/30/22  0539 07/30/22  0918   SODIUM 138 136 134*   POTASSIUM 3.6 3.6 3.9   CHLORIDE 102 100 97   CO2 28 28 25   GLUCOSE 122* 149* 232*   BUN 7* 8 8   CREATININE 0.58 0.55 0.52   CALCIUM 8.0* 7.8* 7.7*                   Imaging  CT-ABDOMEN-PELVIS WITH   Final Result         1. Diffuse wall thickening of the distal esophagus could relate to esophagitis, similar to prior.   2. Moderate amount of stool throughout the colon.   3. Horseshoe kidney. No hydronephrosis. No obstructive left renal calculus.           Assessment/Plan  * Ketoacidosis, diabetic, type 2, no coma (HCC)- (present on admission)  Assessment & Plan  Evident symptoms: Epigastric pain, nausea, vomiting  WBC 13.5, bicarb 17, AG 30, glucose 393.  Lactic acid 2.1.  Patient nonadherent to insulin therapy at home Novolin 70/30  - Hold home insulin at this time, will need to come up with an alternative regiment  - Counseling given to patient about DKA and use of insulin, risk of repeat hospitalizations if not adhering to therapy  Has uncontrolled T2DM, last A1c 14  -ADAT    Complaint of melena- (present on admission)  Assessment & Plan  Patient stated he possibly threw up blood and has had black stools  Checking FOBT  Hemoglobin appears to be stable  Patient states he takes  ibuprofen daily.  Counseled patient on abdominal pain and use of ibuprofen, risks involved.    Esophagitis- (present on admission)  Assessment & Plan  Acute esophagitis on CT scan with LUQ pain from patient.  - IV PPI    Hyponatremia- (present on admission)  Assessment & Plan  Resolved, routine monitoring. RI'ed q6 BMPs 7/30/22    Uncontrolled type 2 diabetes mellitus with hyperglycemia (HCC)- (present on admission)  Assessment & Plan  A1c 14.0 (11/2021)  Patient not adherent to insulin therapy at home  Unclear if he will be adherent to PO medications as well.  High risk for repeat admissions and complications from T2DM, patient counseled on admission.    Noncompliance with medications- (present on admission)  Assessment & Plan  Patient understands how to use and administer his own insulin, but does not consistently use.  Resulting in DKA.  Provided on admission.  Continue counseling and education on this hospitalization.  Provided on admission.  Social work consult to help with PCP establishment  Prior MD called patient's outpatient pharmacy, [facility] on [address], last filled 05/15/22 for 90 days flexpen, 08/10/22 next refill.    Leukocytosis- (present on admission)  Assessment & Plan  Resolved.  Routine monitoring.        VTE prophylaxis: SCDs/TEDs    I have performed a physical exam and reviewed and updated ROS and Plan today (7/30/2022). In review of yesterday's note (7/29/2022), there are no changes except as documented above.

## 2022-07-30 NOTE — PROGRESS NOTES
Telemetry Shift Summary    Rhythm NSR  HR Range 67-83  Ectopy rPVC/coups  Measurements .16/0.08/.40        Normal Values  Rhythm SR  HR Range    Measurements 0.12-0.20 / 0.06-0.10  / 0.30-0.52

## 2022-07-30 NOTE — PROGRESS NOTES
12 hour chart pain check complete    Telemetry Shift Summary    Rhythm Sinus Rhythm  HR Range 83-96  Ectopy rare PVC rare PAC rare Couplet  Measurements .18/.08/.38        Normal Values  Rhythm SR  HR Range    Measurements 0.12-0.20 / 0.06-0.10  / 0.30-0.52

## 2022-07-30 NOTE — CARE PLAN
The patient is Stable - Low risk of patient condition declining or worsening    Shift Goals  Clinical Goals: Monitor glucose, stool sample  Patient Goals: Rest  Family Goals: Not present    Progress made toward(s) clinical / shift goals:  Pt glucose within normal limits, on accu-check four times daily. Stool sample collected and resulted. Pt oxygen saturation 95% on room air, continuous pulse ox monitoring active.    Patient is not progressing towards the following goals: N/A      Problem: Diabetes Management  Goal: Patient will achieve and maintain glucose in satisfactory range  Outcome: Progressing     Problem: Respiratory  Goal: Patient will achieve/maintain optimum respiratory ventilation and gas exchange  Outcome: Progressing

## 2022-07-31 VITALS
RESPIRATION RATE: 16 BRPM | DIASTOLIC BLOOD PRESSURE: 50 MMHG | TEMPERATURE: 98.9 F | WEIGHT: 157.41 LBS | HEART RATE: 80 BPM | HEIGHT: 72 IN | BODY MASS INDEX: 21.32 KG/M2 | SYSTOLIC BLOOD PRESSURE: 98 MMHG | OXYGEN SATURATION: 95 %

## 2022-07-31 PROBLEM — E87.1 HYPONATREMIA: Status: RESOLVED | Noted: 2022-07-28 | Resolved: 2022-07-31

## 2022-07-31 PROBLEM — K92.1 COMPLAINT OF MELENA: Status: RESOLVED | Noted: 2022-07-28 | Resolved: 2022-07-31

## 2022-07-31 PROBLEM — E86.1 HYPOTENSION DUE TO HYPOVOLEMIA: Status: RESOLVED | Noted: 2022-07-28 | Resolved: 2022-07-31

## 2022-07-31 PROBLEM — K92.0 HEMATEMESIS: Status: RESOLVED | Noted: 2022-07-28 | Resolved: 2022-07-31

## 2022-07-31 PROBLEM — E11.10 KETOACIDOSIS, DIABETIC, TYPE 2, NO COMA (HCC): Status: RESOLVED | Noted: 2022-07-28 | Resolved: 2022-07-31

## 2022-07-31 PROBLEM — I95.89 HYPOTENSION DUE TO HYPOVOLEMIA: Status: RESOLVED | Noted: 2022-07-28 | Resolved: 2022-07-31

## 2022-07-31 PROBLEM — D72.829 LEUKOCYTOSIS: Status: RESOLVED | Noted: 2021-11-07 | Resolved: 2022-07-31

## 2022-07-31 LAB
ANION GAP SERPL CALC-SCNC: 10 MMOL/L (ref 7–16)
BUN SERPL-MCNC: 11 MG/DL (ref 8–22)
CALCIUM SERPL-MCNC: 7.6 MG/DL (ref 8.4–10.2)
CHLORIDE SERPL-SCNC: 97 MMOL/L (ref 96–112)
CO2 SERPL-SCNC: 27 MMOL/L (ref 20–33)
CREAT SERPL-MCNC: 0.54 MG/DL (ref 0.5–1.4)
ERYTHROCYTE [DISTWIDTH] IN BLOOD BY AUTOMATED COUNT: 39.8 FL (ref 35.9–50)
GFR SERPLBLD CREATININE-BSD FMLA CKD-EPI: 115 ML/MIN/1.73 M 2
GLUCOSE BLD STRIP.AUTO-MCNC: 287 MG/DL (ref 65–99)
GLUCOSE BLD STRIP.AUTO-MCNC: 330 MG/DL (ref 65–99)
GLUCOSE SERPL-MCNC: 295 MG/DL (ref 65–99)
HCT VFR BLD AUTO: 35.8 % (ref 42–52)
HGB BLD-MCNC: 11.9 G/DL (ref 14–18)
MAGNESIUM SERPL-MCNC: 1.7 MG/DL (ref 1.5–2.5)
MCH RBC QN AUTO: 27.9 PG (ref 27–33)
MCHC RBC AUTO-ENTMCNC: 33.2 G/DL (ref 33.7–35.3)
MCV RBC AUTO: 84 FL (ref 81.4–97.8)
PHOSPHATE SERPL-MCNC: 2.3 MG/DL (ref 2.5–4.5)
PLATELET # BLD AUTO: 176 K/UL (ref 164–446)
PMV BLD AUTO: 9 FL (ref 9–12.9)
POTASSIUM SERPL-SCNC: 3.9 MMOL/L (ref 3.6–5.5)
RBC # BLD AUTO: 4.26 M/UL (ref 4.7–6.1)
SODIUM SERPL-SCNC: 134 MMOL/L (ref 135–145)
WBC # BLD AUTO: 5.4 K/UL (ref 4.8–10.8)

## 2022-07-31 PROCEDURE — 84100 ASSAY OF PHOSPHORUS: CPT

## 2022-07-31 PROCEDURE — 85027 COMPLETE CBC AUTOMATED: CPT

## 2022-07-31 PROCEDURE — 700102 HCHG RX REV CODE 250 W/ 637 OVERRIDE(OP): Performed by: INTERNAL MEDICINE

## 2022-07-31 PROCEDURE — 700105 HCHG RX REV CODE 258: Performed by: INTERNAL MEDICINE

## 2022-07-31 PROCEDURE — A9270 NON-COVERED ITEM OR SERVICE: HCPCS | Performed by: INTERNAL MEDICINE

## 2022-07-31 PROCEDURE — C9113 INJ PANTOPRAZOLE SODIUM, VIA: HCPCS | Performed by: INTERNAL MEDICINE

## 2022-07-31 PROCEDURE — 83735 ASSAY OF MAGNESIUM: CPT

## 2022-07-31 PROCEDURE — 700111 HCHG RX REV CODE 636 W/ 250 OVERRIDE (IP): Performed by: INTERNAL MEDICINE

## 2022-07-31 PROCEDURE — 80048 BASIC METABOLIC PNL TOTAL CA: CPT

## 2022-07-31 PROCEDURE — 82962 GLUCOSE BLOOD TEST: CPT | Mod: 91

## 2022-07-31 PROCEDURE — 99239 HOSP IP/OBS DSCHRG MGMT >30: CPT | Performed by: HOSPITALIST

## 2022-07-31 RX ORDER — PANTOPRAZOLE SODIUM 40 MG/1
40 TABLET, DELAYED RELEASE ORAL DAILY
Qty: 90 TABLET | Refills: 0 | Status: SHIPPED | OUTPATIENT
Start: 2022-08-14 | End: 2022-07-31 | Stop reason: SDUPTHER

## 2022-07-31 RX ORDER — SODIUM CHLORIDE 9 MG/ML
1000 INJECTION, SOLUTION INTRAVENOUS ONCE
Status: COMPLETED | OUTPATIENT
Start: 2022-07-31 | End: 2022-07-31

## 2022-07-31 RX ORDER — PANTOPRAZOLE SODIUM 40 MG/1
40 TABLET, DELAYED RELEASE ORAL 2 TIMES DAILY
Qty: 28 TABLET | Refills: 0 | Status: SHIPPED | OUTPATIENT
Start: 2022-07-31 | End: 2022-07-31 | Stop reason: SDUPTHER

## 2022-07-31 RX ORDER — HUMAN INSULIN 100 [IU]/ML
INJECTION, SUSPENSION SUBCUTANEOUS
Qty: 3 ML | Refills: 2 | Status: SHIPPED | OUTPATIENT
Start: 2022-07-31 | End: 2022-08-29

## 2022-07-31 RX ORDER — HUMAN INSULIN 100 [IU]/ML
INJECTION, SUSPENSION SUBCUTANEOUS
Qty: 3 ML | Refills: 2 | Status: SHIPPED | OUTPATIENT
Start: 2022-07-31 | End: 2022-07-31 | Stop reason: SDUPTHER

## 2022-07-31 RX ORDER — PANTOPRAZOLE SODIUM 40 MG/1
40 TABLET, DELAYED RELEASE ORAL DAILY
Qty: 90 TABLET | Refills: 0 | Status: SHIPPED | OUTPATIENT
Start: 2022-08-14 | End: 2022-10-05 | Stop reason: SDUPTHER

## 2022-07-31 RX ORDER — PANTOPRAZOLE SODIUM 40 MG/1
40 TABLET, DELAYED RELEASE ORAL 2 TIMES DAILY
Qty: 28 TABLET | Refills: 0 | Status: SHIPPED | OUTPATIENT
Start: 2022-07-31 | End: 2022-08-14

## 2022-07-31 RX ADMIN — INSULIN HUMAN 6 UNITS: 100 INJECTION, SOLUTION PARENTERAL at 12:01

## 2022-07-31 RX ADMIN — SENNOSIDES AND DOCUSATE SODIUM 2 TABLET: 50; 8.6 TABLET ORAL at 05:45

## 2022-07-31 RX ADMIN — SODIUM CHLORIDE 1000 ML: 9 INJECTION, SOLUTION INTRAVENOUS at 00:27

## 2022-07-31 RX ADMIN — OXYCODONE HYDROCHLORIDE 5 MG: 5 TABLET ORAL at 10:07

## 2022-07-31 RX ADMIN — PANTOPRAZOLE SODIUM 40 MG: 40 INJECTION, POWDER, FOR SOLUTION INTRAVENOUS at 05:45

## 2022-07-31 RX ADMIN — INSULIN HUMAN 5 UNITS: 100 INJECTION, SOLUTION PARENTERAL at 06:31

## 2022-07-31 RX ADMIN — POTASSIUM CHLORIDE 40 MEQ: 1500 TABLET, EXTENDED RELEASE ORAL at 05:45

## 2022-07-31 ASSESSMENT — PAIN DESCRIPTION - PAIN TYPE: TYPE: ACUTE PAIN

## 2022-07-31 NOTE — CARE PLAN
The patient is Stable - Low risk of patient condition declining or worsening    Shift Goals  Clinical Goals: Maintain medical stability  Patient Goals: Sleep  Family Goals: Not present    Progress made toward(s) clinical / shift goals:  Medical stability maintained.     Problem: Knowledge Deficit - Standard  Goal: Patient and family/care givers will demonstrate understanding of plan of care, disease process/condition, diagnostic tests and medications  Outcome: Progressing  Note: Updated patient on plan of care including medications and treatments. Encouraged verbalization of questions and concerns. All concerns have been addressed at this time.      Problem: Nutrition Deficit - Diabetes  Goal: Patient will demonstrate adequate hydration and vital signs  Outcome: Progressing  Note: Patient required 1L fluid bolus to meet adequate hydration and stable vital signs.        Patient is not progressing towards the following goals:

## 2022-07-31 NOTE — PROGRESS NOTES
Discharge instructions given and discussed, signed copy in chart. Pt verbalized understanding and all questions answered. Prescriptions sent to pharmacy electronically. Pt discharged home in stable condition on room air escorted by self. Personal belongings verified with patient. IV removed and tolerated well. Tele box removed, monitor tech notified.

## 2022-07-31 NOTE — PROGRESS NOTES
Telemetry Shift Summary     Rhythm Sinus Rhythm  HR Range 83-90  Ectopy rare PVC rare PAC rare trigem  Measurements .16/.10/.36           Normal Values  Rhythm SR  HR Range    Measurements 0.12-0.20 / 0.06-0.10  / 0.30-0.52

## 2022-07-31 NOTE — PROGRESS NOTES
Monitor Summary:    Rhythm:  SR   Rate Range:  74-87  Ectopy: Rare PVC's/PAC's    Measurements:  0.18/0.10/0.34

## 2022-07-31 NOTE — PROGRESS NOTES
Telemetry Shift Summary     Rhythm: SR  HR: 74-95  Ectopy: rPVC  Measurements: .18/.08/.40   (Per 0400 strip)  Normal Values  Rhythm: SR  HR:   Measurements: 0.12-0.20 / 0.04-0.10 / 0.30-0.52    Strip reviewed and placed in chart

## 2022-07-31 NOTE — PROGRESS NOTES
"Patient agitated and irritated, pushed nurses hands away stating \"I'll eat later!\" RN told patient that the tele box needs to be changed. Patient states, \"Whatever, do what you need.\" Patient did refuse assessment at this time, will continue to monitor.   "

## 2022-07-31 NOTE — DISCHARGE INSTRUCTIONS
Discharge Instructions    Discharged to home by car with relative. Discharged via wheelchair, hospital escort: Refused.  Special equipment needed: Not Applicable    Be sure to schedule a follow-up appointment with your primary care doctor or any specialists as instructed.     Discharge Plan:   Diet Plan: Discussed  Activity Level: Discussed  Confirmed Follow up Appointment: Patient to Call and Schedule Appointment  Confirmed Symptoms Management: Discussed  Medication Reconciliation Updated: Yes    I understand that a diet low in cholesterol, fat, and sodium is recommended for good health. Unless I have been given specific instructions below for another diet, I accept this instruction as my diet prescription.   Other diet: Diabetes Mellitus and Nutrition, Adult  When you have diabetes (diabetes mellitus), it is very important to have healthy eating habits because your blood sugar (glucose) levels are greatly affected by what you eat and drink. Eating healthy foods in the appropriate amounts, at about the same times every day, can help you:  Control your blood glucose.  Lower your risk of heart disease.  Improve your blood pressure.  Reach or maintain a healthy weight.  Every person with diabetes is different, and each person has different needs for a meal plan. Your health care provider may recommend that you work with a diet and nutrition specialist (dietitian) to make a meal plan that is best for you. Your meal plan may vary depending on factors such as:  The calories you need.  The medicines you take.  Your weight.  Your blood glucose, blood pressure, and cholesterol levels.  Your activity level.  Other health conditions you have, such as heart or kidney disease.  How do carbohydrates affect me?  Carbohydrates, also called carbs, affect your blood glucose level more than any other type of food. Eating carbs naturally raises the amount of glucose in your blood. Carb counting is a method for keeping track of how  "many carbs you eat. Counting carbs is important to keep your blood glucose at a healthy level, especially if you use insulin or take certain oral diabetes medicines.  It is important to know how many carbs you can safely have in each meal. This is different for every person. Your dietitian can help you calculate how many carbs you should have at each meal and for each snack.  Foods that contain carbs include:  Bread, cereal, rice, pasta, and crackers.  Potatoes and corn.  Peas, beans, and lentils.  Milk and yogurt.  Fruit and juice.  Desserts, such as cakes, cookies, ice cream, and candy.  How does alcohol affect me?  Alcohol can cause a sudden decrease in blood glucose (hypoglycemia), especially if you use insulin or take certain oral diabetes medicines. Hypoglycemia can be a life-threatening condition. Symptoms of hypoglycemia (sleepiness, dizziness, and confusion) are similar to symptoms of having too much alcohol.  If your health care provider says that alcohol is safe for you, follow these guidelines:  Limit alcohol intake to no more than 1 drink per day for nonpregnant women and 2 drinks per day for men. One drink equals 12 oz of beer, 5 oz of wine, or 1½ oz of hard liquor.  Do not drink on an empty stomach.  Keep yourself hydrated with water, diet soda, or unsweetened iced tea.  Keep in mind that regular soda, juice, and other mixers may contain a lot of sugar and must be counted as carbs.  What are tips for following this plan?    Reading food labels  Start by checking the serving size on the \"Nutrition Facts\" label of packaged foods and drinks. The amount of calories, carbs, fats, and other nutrients listed on the label is based on one serving of the item. Many items contain more than one serving per package.  Check the total grams (g) of carbs in one serving. You can calculate the number of servings of carbs in one serving by dividing the total carbs by 15. For example, if a food has 30 g of total carbs, " "it would be equal to 2 servings of carbs.  Check the number of grams (g) of saturated and trans fats in one serving. Choose foods that have low or no amount of these fats.  Check the number of milligrams (mg) of salt (sodium) in one serving. Most people should limit total sodium intake to less than 2,300 mg per day.  Always check the nutrition information of foods labeled as \"low-fat\" or \"nonfat\". These foods may be higher in added sugar or refined carbs and should be avoided.  Talk to your dietitian to identify your daily goals for nutrients listed on the label.  Shopping  Avoid buying canned, premade, or processed foods. These foods tend to be high in fat, sodium, and added sugar.  Shop around the outside edge of the grocery store. This includes fresh fruits and vegetables, bulk grains, fresh meats, and fresh dairy.  Cooking  Use low-heat cooking methods, such as baking, instead of high-heat cooking methods like deep frying.  Cook using healthy oils, such as olive, canola, or sunflower oil.  Avoid cooking with butter, cream, or high-fat meats.  Meal planning  Eat meals and snacks regularly, preferably at the same times every day. Avoid going long periods of time without eating.  Eat foods high in fiber, such as fresh fruits, vegetables, beans, and whole grains. Talk to your dietitian about how many servings of carbs you can eat at each meal.  Eat 4-6 ounces (oz) of lean protein each day, such as lean meat, chicken, fish, eggs, or tofu. One oz of lean protein is equal to:  1 oz of meat, chicken, or fish.  1 egg.  ¼ cup of tofu.  Eat some foods each day that contain healthy fats, such as avocado, nuts, seeds, and fish.  Lifestyle  Check your blood glucose regularly.  Exercise regularly as told by your health care provider. This may include:  150 minutes of moderate-intensity or vigorous-intensity exercise each week. This could be brisk walking, biking, or water aerobics.  Stretching and doing strength exercises, " such as yoga or weightlifting, at least 2 times a week.  Take medicines as told by your health care provider.  Do not use any products that contain nicotine or tobacco, such as cigarettes and e-cigarettes. If you need help quitting, ask your health care provider.  Work with a counselor or diabetes educator to identify strategies to manage stress and any emotional and social challenges.  Questions to ask a health care provider  Do I need to meet with a diabetes educator?  Do I need to meet with a dietitian?  What number can I call if I have questions?  When are the best times to check my blood glucose?  Where to find more information:  American Diabetes Association: diabetes.org  Academy of Nutrition and Dietetics: www.eatright.org  National [place] of Diabetes and Digestive and Kidney Diseases (NIH): www.niddk.nih.gov  Summary  A healthy meal plan will help you control your blood glucose and maintain a healthy lifestyle.  Working with a diet and nutrition specialist (dietitian) can help you make a meal plan that is best for you.  Keep in mind that carbohydrates (carbs) and alcohol have immediate effects on your blood glucose levels. It is important to count carbs and to use alcohol carefully.  This information is not intended to replace advice given to you by your health care provider. Make sure you discuss any questions you have with your health care provider.  Document Released: 09/14/2006 Document Revised: 11/30/2018 Document Reviewed: 01/22/2018  Elsevier Patient Education © 2020 [employer]      Special Instructions: None    -Is this patient being discharged with medication to prevent blood clots?  No    Is patient discharged on Warfarin / Coumadin?   No

## 2022-07-31 NOTE — DISCHARGE SUMMARY
"Discharge Summary    CHIEF COMPLAINT ON ADMISSION  Chief Complaint   Patient presents with   • Abdominal Pain     Pt reports severe abdominal pain for 4 days. Denies any fevers.   • N/V     Pt reports it started 4 days ago.        Reason for Admission  EMS     Admission Date  7/28/2022    CODE STATUS  Full Code    HPI & HOSPITAL COURSE  For full details of admission please see the H&P of Dr. Greenberg dated 7/28/2022, briefly, \"Christoph Taylor is a 57 y.o. male who presented 7/28/2022 with abdominal pain and emesis at home.  Patient's pain severe, epigastric, non-radiating, causing nausea, vomited at home multiple times, was taking ibuprofen, stated he had red color to vomit and dark stools. He states he fell at home, has left leg boot in place from fractures. He states he cant get around to his appointments, has not obtained PCP since changing insurances in Jan 2022 from [employer] to current.     He had onset of symptoms days ago, did come to ED and was treated for mild DKA. He has not been using his insulin at home (Novolin 70/30). At prior visit, beta hydroxybutyrate was higher than normal reference ranges.  At that time, patient removed his own IV lines and did not want it replaced, thus he completed treatment without IV meds.     Spoke with EDP Dr. Schwarz, patient presenting with WBC 13.5, bicarb 17, AG 30, glucose 393, Na 134. CT scan showed distal esophagitis.  Patient requiring fentanyl IV dose, 2L LR boluses given.  EMS gave antiemetic. BP on arrival 85/66. Patient tachycardic HR 95.  SIRS criteria met, no obvious infection.\"    This was initially managed on the floor however patient became hypotensive on the afternoon of hospital day 0, transferred to the ICU for an insulin drip and close monitoring.  Patient improved with these interventions and was stable for discontinuation of the insulin drip and transferred to the ceja on 7/29/2022.  No further hypotensive episodes.  Patient was placed on high-dose PPI, serial " hemoglobins were checked and stable.  No need for emergent endoscopy.  Patient was counseled on sobriety and the importance of maintaining adherence with home insulins to prevent this from happening again.    Therefore, he is discharged in good and stable condition to home with close outpatient follow-up.    The patient met 2-midnight criteria for an inpatient stay at the time of discharge.    Discharge Date  07/31/22      FOLLOW UP ITEMS POST DISCHARGE  None    DISCHARGE DIAGNOSES  Principal Problem (Resolved):    Ketoacidosis, diabetic, type 2, no coma (HCC) POA: Yes  Active Problems:    Noncompliance with medications POA: Yes    Uncontrolled type 2 diabetes mellitus with hyperglycemia (HCC) POA: Yes    Esophagitis POA: Yes    DKA, type 1, not at goal (HCC) POA: Yes  Resolved Problems:    Leukocytosis POA: Yes    Hyponatremia POA: Yes    Complaint of melena POA: Yes    Hematemesis POA: Unknown    Hypotension due to hypovolemia POA: Unknown      FOLLOW UP  No future appointments.  No follow-up provider specified.    MEDICATIONS ON DISCHARGE     Medication List      START taking these medications      Instructions   * pantoprazole 40 MG Tbec  Commonly known as: PROTONIX   Take 1 Tablet by mouth 2 times a day for 14 days.  Dose: 40 mg     * pantoprazole 40 MG Tbec  Start taking on: August 14, 2022  Commonly known as: PROTONIX   Take 1 Tablet by mouth every day for 90 days.  Dose: 40 mg         * This list has 2 medication(s) that are the same as other medications prescribed for you. Read the directions carefully, and ask your doctor or other care provider to review them with you.            CHANGE how you take these medications      Instructions   NovoLIN 70/30 FlexPen (70-30) 100 UNIT/ML Supn  What changed:   · medication strength  · how much to take  · how to take this  · when to take this  · additional instructions  Generic drug: Insulin NPH Isophane & Regular   Sliding Scale As Directed        CONTINUE taking  these medications      Instructions   One-A-Day Mens Tabs   Take 1 Tablet by mouth every day.  Dose: 1 Tablet        STOP taking these medications    aspirin EC 81 MG Tbec  Commonly known as: ECOTRIN     ibuprofen 200 MG Tabs  Commonly known as: MOTRIN            Allergies  No Known Allergies    DIET  Orders Placed This Encounter   Procedures   • Diet Order Diet: Consistent CHO (Diabetic)     Standing Status:   Standing     Number of Occurrences:   1     Order Specific Question:   Diet:     Answer:   Consistent CHO (Diabetic) [4]       ACTIVITY  As tolerated.  Weight bearing as tolerated    CONSULTATIONS  Savi Ring MD, Pulmonary Medicine    RADIOLOGY  CT-ABDOMEN-PELVIS WITH   Final Result         1. Diffuse wall thickening of the distal esophagus could relate to esophagitis, similar to prior.   2. Moderate amount of stool throughout the colon.   3. Horseshoe kidney. No hydronephrosis. No obstructive left renal calculus.            PROCEDURES  None    LABORATORY  Lab Results   Component Value Date    SODIUM 134 (L) 07/31/2022    POTASSIUM 3.9 07/31/2022    CHLORIDE 97 07/31/2022    CO2 27 07/31/2022    GLUCOSE 295 (H) 07/31/2022    BUN 11 07/31/2022    CREATININE 0.54 07/31/2022        Lab Results   Component Value Date    WBC 5.4 07/31/2022    HEMOGLOBIN 11.9 (L) 07/31/2022    HEMATOCRIT 35.8 (L) 07/31/2022    PLATELETCT 176 07/31/2022        Total time of the discharge process exceeds 33 minutes.

## 2022-08-01 ENCOUNTER — PATIENT OUTREACH (OUTPATIENT)
Dept: SCHEDULING | Facility: IMAGING CENTER | Age: 57
End: 2022-08-01
Payer: COMMERCIAL

## 2022-08-01 NOTE — PROGRESS NOTES
Received bedside report from RN Rosemary, pt care assumed. VS stable, pt assessment complete. Pt A&Ox4, patient c/o 5/10 mild pain at this time, medication was given per MAR. POC discussed with pt and verbalizes no questions. Pt denies any additional needs at this time. Bed locked and in lowest position. Pt educated on fall risk and verbalized understanding, call light within reach, hourly rounding initiated.

## 2022-08-29 ENCOUNTER — HOSPITAL ENCOUNTER (EMERGENCY)
Facility: MEDICAL CENTER | Age: 57
End: 2022-08-29
Attending: EMERGENCY MEDICINE
Payer: COMMERCIAL

## 2022-08-29 VITALS
TEMPERATURE: 98.2 F | BODY MASS INDEX: 22.4 KG/M2 | WEIGHT: 165.34 LBS | RESPIRATION RATE: 18 BRPM | DIASTOLIC BLOOD PRESSURE: 67 MMHG | OXYGEN SATURATION: 99 % | HEIGHT: 72 IN | HEART RATE: 78 BPM | SYSTOLIC BLOOD PRESSURE: 105 MMHG

## 2022-08-29 DIAGNOSIS — Z76.0 MEDICATION REFILL: ICD-10-CM

## 2022-08-29 DIAGNOSIS — E10.10 DKA, TYPE 1, NOT AT GOAL (HCC): ICD-10-CM

## 2022-08-29 PROCEDURE — 99282 EMERGENCY DEPT VISIT SF MDM: CPT

## 2022-08-29 ASSESSMENT — FIBROSIS 4 INDEX: FIB4 SCORE: 0.77

## 2022-08-29 NOTE — ED NOTES
Pt using a friends Humolog x 1 mon His pharmacy doesn't stock the novolog pen Pt just wants the vials

## 2022-08-30 NOTE — ED PROVIDER NOTES
ED Provider Note    CHIEF COMPLAINT  Chief Complaint   Patient presents with    Medication Refill     Ran out of insulin 2 d ago Wants a refill  Pt not established with a PMD Trying to find one       HPI  Christoph Taylor is a 57 y.o. male who presents for evaluation of medication refill.  Patient states he is a insulin-dependent diabetic and has not been able to refill his Novolin 70/30 because it was in pen form and he needs the vial form.  He notes his last sugar level was around 170 today and he has no symptoms other than chronic low back pain from a remote compression fracture.    REVIEW OF SYSTEMS  Constitutional: No fevers or chills  Skin: No rashes  HEENT: No sore throat, runny nose  Neck: No neck pain, stiffness, or masses.  Chest: No pain   Pulm: No shortness of breath, cough  Gastrointestinal: No nausea, vomiting, diarrhea or abdominal pain.  Genitourinary: No dysuria or hematuria  Musculoskeletal: No pain, swelling, weakness  Neurologic: No sensory or motor changes to lower extremities      PAST FAM HISTORY  Family History   Problem Relation Age of Onset    Heart Disease Father        PAST MEDICAL HISTORY   has a past medical history of Diabetes (HCC), [place] (hard of hearing), and Hypercholesteremia.    SOCIAL HISTORY  Social History     Tobacco Use    Smoking status: Former    Smokeless tobacco: Never   Vaping Use    Vaping Use: Not on file   Substance and Sexual Activity    Alcohol use: Not Currently    Drug use: Not Currently    Sexual activity: Not on file       SURGICAL HISTORY   has a past surgical history that includes other.    CURRENT MEDICATIONS  Home Medications    **Home medications have not yet been reviewed for this encounter**         ALLERGIES  No Known Allergies    PHYSICAL EXAM  VITAL SIGNS: /67   Pulse 78   Temp 36.8 °C (98.2 °F) (Temporal)   Resp 18   Ht 1.829 m (6')   Wt 75 kg (165 lb 5.5 oz)   SpO2 99%   BMI 22.42 kg/m²    Gen: Alert in no apparent distress.  HEENT: No  signs of trauma, Bilateral external ears normal, Nose normal. Conjunctiva normal, Non-icteric.   Cardiovascular: Regular rate and rhythm, no murmurs.  Capillary refill less than 3 seconds to all extremities, 2+ distal pulses.  Thorax & Lungs: Normal breath sounds, No respiratory distress, No wheezing bilateral chest rise  Abdomen: Bowel sounds normal, Soft, No tenderness  Skin: Warm, Dry  Extremities: Intact distal pulses, No edema  Neurologic: Alert , no facial droop, grossly normal coordination and strength  Psychiatric: Affect pleasant        COURSE & MEDICAL DECISION MAKING  Patient arrives for evaluation of the need for medication refill, specifically is Novolin insulin.  Patient does not have any symptoms except for his chronic low back pain which is not associated with any neurologic problems or bowel/bladder issues.  I do not feel labs or imaging are warranted emergently although he was cautioned that these short-term prescriptions for his chronic medications do not take the place of a primary care physician who can manage his disease as an outpatient.  Patient states understanding of this and will be given a vial for his 70/30.    FINAL IMPRESSION  1. Medication refill    2. DKA, type 1, not at goal (HCC)        Electronically signed by: Rafa Irizarry M.D., 8/29/2022 5:02 PM

## 2022-08-30 NOTE — ED NOTES
Pt D/C to home. D/C instructions and prescriptions given. Pt v/u. Pt leaves ED with no acute changes, complaints or concerns.

## 2022-08-31 ENCOUNTER — TELEPHONE (OUTPATIENT)
Dept: SCHEDULING | Facility: IMAGING CENTER | Age: 57
End: 2022-08-31

## 2022-09-01 NOTE — DISCHARGE PLANNING
Anticipated Discharge Disposition: Home    Action: Patient returned to ER today that he is unable to fill insulin. ER CM reviewed chart. Was in ER 8.29.21 after being discharged from hospital for DKA. Chart reviewed. Called pharmacy. PAR required. [phone]. Automated response PAR started can take several days. ER CM requested Service Rep Spoke with Rachel and provided clinical information. Patient needs to establish with pcp. He needs to call the number on the back of his card today and get assigned and schedule appt. It did not let her finalize he is assigned a team that has been following his meds. He per the chart wanted the vial as his pharmacy no longer stocked the pen. Spoke with Rebekah, update Urgent status. Approved thru  [id number] .Will check with pharmacy. 50.00 co pay        Barriers to Discharge: None    Plan: Pt should be able to get rx now

## 2022-09-02 ENCOUNTER — PHARMACY VISIT (OUTPATIENT)
Dept: PHARMACY | Facility: MEDICAL CENTER | Age: 57
End: 2022-09-02
Payer: COMMERCIAL

## 2022-09-02 ENCOUNTER — HOSPITAL ENCOUNTER (EMERGENCY)
Facility: MEDICAL CENTER | Age: 57
End: 2022-09-02
Attending: EMERGENCY MEDICINE
Payer: COMMERCIAL

## 2022-09-02 VITALS
HEIGHT: 72 IN | TEMPERATURE: 98.5 F | HEART RATE: 108 BPM | OXYGEN SATURATION: 93 % | SYSTOLIC BLOOD PRESSURE: 91 MMHG | WEIGHT: 163.14 LBS | DIASTOLIC BLOOD PRESSURE: 62 MMHG | RESPIRATION RATE: 18 BRPM | BODY MASS INDEX: 22.1 KG/M2

## 2022-09-02 DIAGNOSIS — Z76.0 MEDICATION REFILL: ICD-10-CM

## 2022-09-02 DIAGNOSIS — R73.9 HYPERGLYCEMIA: ICD-10-CM

## 2022-09-02 LAB
ALBUMIN SERPL BCP-MCNC: 3.9 G/DL (ref 3.2–4.9)
ALBUMIN/GLOB SERPL: 1.5 G/DL
ALP SERPL-CCNC: 104 U/L (ref 30–99)
ALT SERPL-CCNC: 13 U/L (ref 2–50)
ANION GAP SERPL CALC-SCNC: 9 MMOL/L (ref 7–16)
AST SERPL-CCNC: 11 U/L (ref 12–45)
BASOPHILS # BLD AUTO: 0.2 % (ref 0–1.8)
BASOPHILS # BLD: 0.01 K/UL (ref 0–0.12)
BILIRUB SERPL-MCNC: 0.6 MG/DL (ref 0.1–1.5)
BUN SERPL-MCNC: 19 MG/DL (ref 8–22)
CALCIUM SERPL-MCNC: 9 MG/DL (ref 8.4–10.2)
CHLORIDE SERPL-SCNC: 97 MMOL/L (ref 96–112)
CO2 SERPL-SCNC: 29 MMOL/L (ref 20–33)
CREAT SERPL-MCNC: 0.73 MG/DL (ref 0.5–1.4)
EOSINOPHIL # BLD AUTO: 0.11 K/UL (ref 0–0.51)
EOSINOPHIL NFR BLD: 1.9 % (ref 0–6.9)
ERYTHROCYTE [DISTWIDTH] IN BLOOD BY AUTOMATED COUNT: 38.5 FL (ref 35.9–50)
GFR SERPLBLD CREATININE-BSD FMLA CKD-EPI: 105 ML/MIN/1.73 M 2
GLOBULIN SER CALC-MCNC: 2.6 G/DL (ref 1.9–3.5)
GLUCOSE SERPL-MCNC: 545 MG/DL (ref 65–99)
HCT VFR BLD AUTO: 37.6 % (ref 42–52)
HGB BLD-MCNC: 12.6 G/DL (ref 14–18)
IMM GRANULOCYTES # BLD AUTO: 0.01 K/UL (ref 0–0.11)
IMM GRANULOCYTES NFR BLD AUTO: 0.2 % (ref 0–0.9)
LYMPHOCYTES # BLD AUTO: 1.37 K/UL (ref 1–4.8)
LYMPHOCYTES NFR BLD: 24.2 % (ref 22–41)
MCH RBC QN AUTO: 27.7 PG (ref 27–33)
MCHC RBC AUTO-ENTMCNC: 33.5 G/DL (ref 33.7–35.3)
MCV RBC AUTO: 82.6 FL (ref 81.4–97.8)
MONOCYTES # BLD AUTO: 0.55 K/UL (ref 0–0.85)
MONOCYTES NFR BLD AUTO: 9.7 % (ref 0–13.4)
NEUTROPHILS # BLD AUTO: 3.61 K/UL (ref 1.82–7.42)
NEUTROPHILS NFR BLD: 63.8 % (ref 44–72)
NRBC # BLD AUTO: 0 K/UL
NRBC BLD-RTO: 0 /100 WBC
PLATELET # BLD AUTO: 221 K/UL (ref 164–446)
PMV BLD AUTO: 8.8 FL (ref 9–12.9)
POTASSIUM SERPL-SCNC: 4.5 MMOL/L (ref 3.6–5.5)
PROT SERPL-MCNC: 6.5 G/DL (ref 6–8.2)
RBC # BLD AUTO: 4.55 M/UL (ref 4.7–6.1)
SODIUM SERPL-SCNC: 135 MMOL/L (ref 135–145)
WBC # BLD AUTO: 5.7 K/UL (ref 4.8–10.8)

## 2022-09-02 PROCEDURE — 85025 COMPLETE CBC W/AUTO DIFF WBC: CPT

## 2022-09-02 PROCEDURE — 700102 HCHG RX REV CODE 250 W/ 637 OVERRIDE(OP): Performed by: EMERGENCY MEDICINE

## 2022-09-02 PROCEDURE — 82962 GLUCOSE BLOOD TEST: CPT

## 2022-09-02 PROCEDURE — 99283 EMERGENCY DEPT VISIT LOW MDM: CPT

## 2022-09-02 PROCEDURE — 96372 THER/PROPH/DIAG INJ SC/IM: CPT

## 2022-09-02 PROCEDURE — RXMED WILLOW AMBULATORY MEDICATION CHARGE: Performed by: EMERGENCY MEDICINE

## 2022-09-02 PROCEDURE — 36415 COLL VENOUS BLD VENIPUNCTURE: CPT

## 2022-09-02 PROCEDURE — 80053 COMPREHEN METABOLIC PANEL: CPT

## 2022-09-02 RX ORDER — GLUCOSAMINE HCL/CHONDROITIN SU 500-400 MG
CAPSULE ORAL
Qty: 100 EACH | Refills: 0 | Status: SHIPPED | OUTPATIENT
Start: 2022-09-02 | End: 2023-03-11

## 2022-09-02 RX ORDER — INSULIN GLARGINE 100 [IU]/ML
20 INJECTION, SOLUTION SUBCUTANEOUS EVERY EVENING
Qty: 10 ML | Refills: 0 | Status: SHIPPED | OUTPATIENT
Start: 2022-09-02 | End: 2023-03-11

## 2022-09-02 RX ORDER — LANCETS 30 GAUGE
EACH MISCELLANEOUS
Qty: 100 EACH | Refills: 0 | Status: SHIPPED | OUTPATIENT
Start: 2022-09-02 | End: 2022-11-11 | Stop reason: SDUPTHER

## 2022-09-02 RX ORDER — INSULIN LISPRO 100 [IU]/ML
2-9 INJECTION, SOLUTION INTRAVENOUS; SUBCUTANEOUS
Qty: 10 ML | Refills: 0 | Status: SHIPPED | OUTPATIENT
Start: 2022-09-02 | End: 2022-09-02

## 2022-09-02 RX ORDER — BLOOD-GLUCOSE METER
KIT MISCELLANEOUS
Qty: 100 STRIP | Refills: 0 | Status: SHIPPED | OUTPATIENT
Start: 2022-09-02 | End: 2022-11-11 | Stop reason: SDUPTHER

## 2022-09-02 RX ORDER — SODIUM CHLORIDE 9 MG/ML
1000 INJECTION, SOLUTION INTRAVENOUS ONCE
Status: DISCONTINUED | OUTPATIENT
Start: 2022-09-02 | End: 2022-09-02 | Stop reason: HOSPADM

## 2022-09-02 RX ADMIN — INSULIN HUMAN 7 UNITS: 100 INJECTION, SOLUTION PARENTERAL at 13:41

## 2022-09-02 ASSESSMENT — FIBROSIS 4 INDEX: FIB4 SCORE: 0.77

## 2022-09-02 NOTE — ED PROVIDER NOTES
"ED Provider Note    CHIEF COMPLAINT  Chief Complaint   Patient presents with    Medication Refill     Reports he ran out of novolin 3 days ago, blood sugars \"have been in the 300's\" since. .       HPI  Christoph Taylor is a 57 y.o. male who presents after having issues filling his 70/30.  Patient reports that his sugars have been persistently high since he has not been able to take his Novolin; pt denies any fevers, chills or constitutional sxs.  Patient denies any associated nausea or vomiting.  Patient denies any associated abdominal pain.  Patient was seen here 4 days ago and prescribed insulin 70/30, [facility] however [facility] that [facility] not [facility] any [facility] in stock.  Therefore he came to the emergency department to help troubleshoot these issues.    REVIEW OF SYSTEMS  ROS    See HPI for further details. All other systems are negative.     PAST MEDICAL HISTORY   has a past medical history of Diabetes (HCC), [place] (hard of hearing), and Hypercholesteremia.    SOCIAL HISTORY  Social History     Tobacco Use    Smoking status: Former    Smokeless tobacco: Never   Vaping Use    Vaping Use: Not on file   Substance and Sexual Activity    Alcohol use: Not Currently    Drug use: Not Currently    Sexual activity: Not on file       SURGICAL HISTORY   has a past surgical history that includes other.    CURRENT MEDICATIONS  Home Medications    **Home medications have not yet been reviewed for this encounter**         ALLERGIES  No Known Allergies    PHYSICAL EXAM  Vitals:    09/02/22 1238   BP: (!) 91/62   Pulse: (!) 108   Resp: 18   Temp: 36.9 °C (98.5 °F)   SpO2: 93%       Physical Exam  Constitutional:       Appearance: He is well-developed.   HENT:      Head: Normocephalic and atraumatic.   Eyes:      Conjunctiva/sclera: Conjunctivae normal.      Pupils: Pupils are equal, round, and reactive to light.   Cardiovascular:      Rate and Rhythm: Normal rate and regular " rhythm.      Heart sounds: No murmur heard.    No friction rub. No gallop.   Pulmonary:      Effort: Pulmonary effort is normal. No respiratory distress.      Breath sounds: Normal breath sounds. No wheezing.   Abdominal:      General: Bowel sounds are normal. There is no distension.      Palpations: Abdomen is soft.      Tenderness: There is no abdominal tenderness. There is no rebound.   Musculoskeletal:      Cervical back: Normal range of motion and neck supple.   Skin:     General: Skin is warm and dry.   Neurological:      Mental Status: He is alert and oriented to person, place, and time.   Psychiatric:         Behavior: Behavior normal.         DIAGNOSTIC STUDIES / PROCEDURES      LABS  Results for orders placed or performed during the hospital encounter of 09/02/22   CBC WITH DIFFERENTIAL   Result Value Ref Range    WBC 5.7 4.8 - 10.8 K/uL    RBC 4.55 (L) 4.70 - 6.10 M/uL    Hemoglobin 12.6 (L) 14.0 - 18.0 g/dL    Hematocrit 37.6 (L) 42.0 - 52.0 %    MCV 82.6 81.4 - 97.8 fL    MCH 27.7 27.0 - 33.0 pg    MCHC 33.5 (L) 33.7 - 35.3 g/dL    RDW 38.5 35.9 - 50.0 fL    Platelet Count 221 164 - 446 K/uL    MPV 8.8 (L) 9.0 - 12.9 fL    Neutrophils-Polys 63.80 44.00 - 72.00 %    Lymphocytes 24.20 22.00 - 41.00 %    Monocytes 9.70 0.00 - 13.40 %    Eosinophils 1.90 0.00 - 6.90 %    Basophils 0.20 0.00 - 1.80 %    Immature Granulocytes 0.20 0.00 - 0.90 %    Nucleated RBC 0.00 /100 WBC    Neutrophils (Absolute) 3.61 1.82 - 7.42 K/uL    Lymphs (Absolute) 1.37 1.00 - 4.80 K/uL    Monos (Absolute) 0.55 0.00 - 0.85 K/uL    Eos (Absolute) 0.11 0.00 - 0.51 K/uL    Baso (Absolute) 0.01 0.00 - 0.12 K/uL    Immature Granulocytes (abs) 0.01 0.00 - 0.11 K/uL    NRBC (Absolute) 0.00 K/uL   CMP   Result Value Ref Range    Sodium 135 135 - 145 mmol/L    Potassium 4.5 3.6 - 5.5 mmol/L    Chloride 97 96 - 112 mmol/L    Co2 29 20 - 33 mmol/L    Anion Gap 9.0 7.0 - 16.0    Glucose 545 (HH) 65 - 99 mg/dL    Bun 19 8 - 22 mg/dL     Creatinine 0.73 0.50 - 1.40 mg/dL    Calcium 9.0 8.4 - 10.2 mg/dL    AST(SGOT) 11 (L) 12 - 45 U/L    ALT(SGPT) 13 2 - 50 U/L    Alkaline Phosphatase 104 (H) 30 - 99 U/L    Total Bilirubin 0.6 0.1 - 1.5 mg/dL    Albumin 3.9 3.2 - 4.9 g/dL    Total Protein 6.5 6.0 - 8.2 g/dL    Globulin 2.6 1.9 - 3.5 g/dL    A-G Ratio 1.5 g/dL   ESTIMATED GFR   Result Value Ref Range    GFR (CKD-EPI) 105 >60 mL/min/1.73 m 2           COURSE & MEDICAL DECISION MAKING  Pertinent Labs & Imaging studies reviewed. (See chart for details)    Insulin-dependent diabetic here with elevated glucose.  We will check basic labs for evaluation of possible DKA, clinically patient is relatively well-appearing, he was tachycardic when he arrived but has a normal heart rate on my exam.  Will give IV fluids.  Patient is refusing IV fluids, he is refusing basic labs.  I discussed with him that I would like to do an at least cursory work-up for DKA, he will only allow basic labs checked, he understands the risk of DKA which could cause death or severe disability.  Patient is refusing any IV fluids or IV placement.  [facility]; unfortunately they are out of stock till Monday.  We have called multiple pharmacies and unfortunately they are all out of 7030 as well.  Therefore patient will be switched to glargine and Novolin.  Patient will go to pharmacy and pick these up.  Patient's labs are not consistent with DKA.  He does not want to stay for any further care or IV fluids.      FINAL IMPRESSION    1. Medication refill  insulin glargine (LANTUS) 100 UNIT/ML Solution    DISCONTINUED: insulin lispro (HUMALOG) 100 UNIT/ML      2. Hyperglycemia  insulin glargine (LANTUS) 100 UNIT/ML Solution    insulin regular (HUMULIN R) 100 Unit/mL Solution    Lancets    glucose blood (FREESTYLE LITE) strip    Alcohol Swabs    Insulin Syringes U-100 0.5 mL    DISCONTINUED: insulin lispro (HUMALOG) 100 UNIT/ML              Electronically  signed by: Emiliano Mark M.D., 9/2/2022 12:58 PM

## 2022-09-02 NOTE — ED NOTES
graham from Lab called with critical result of glucose 545 at 1349. Critical lab result read back to graham.   Dr. Mark notified of critical lab result at 1349.  Critical lab result read back by Dr. Mark.

## 2022-09-02 NOTE — ED NOTES
Pt eloped after insulin administration. Prescription sent to [facility]. Pt called and given instruction to follow up with this pharmacy.

## 2022-09-02 NOTE — DISCHARGE INSTRUCTIONS
7030 is in shortage, and likely will not be replenished until midweek next week.  Therefore we sent a prescription for Lantus which will be a new long-acting, and insulin regular which will be your short acting.  We sent this to the [facility] pharmacy

## 2022-09-03 LAB — GLUCOSE BLD STRIP.AUTO-MCNC: 435 MG/DL (ref 65–99)

## 2022-09-27 ENCOUNTER — TELEPHONE (OUTPATIENT)
Dept: MEDICAL GROUP | Facility: LAB | Age: 57
End: 2022-09-27
Payer: COMMERCIAL

## 2022-09-27 ENCOUNTER — TELEPHONE (OUTPATIENT)
Dept: SCHEDULING | Facility: IMAGING CENTER | Age: 57
End: 2022-09-27

## 2022-09-27 RX ORDER — NALOXONE HYDROCHLORIDE 4 MG/.1ML
SPRAY NASAL
COMMUNITY
Start: 2022-07-24 | End: 2023-03-11

## 2022-09-27 RX ORDER — OXYCODONE HYDROCHLORIDE 5 MG/1
TABLET ORAL
COMMUNITY
Start: 2022-07-21 | End: 2022-10-12

## 2022-09-27 NOTE — TELEPHONE ENCOUNTER
NEW PATIENT VISIT PRE-VISIT PLANNING    1.  Epic[employer] Patient is checked in Patient Demographics?Yes    2.  Immunizations were updated in Epic using Reconcile Outside Information activity? Yes         3.  Is this appointment scheduled as a Hospital Follow-Up? Yes, visit was at [facility].     4.  Patient is due for the following Health Maintenance Topics:   Health Maintenance Due   Topic Date Due    IMM HEP B VACCINE (1 of 3 - 3-dose series) Never done    HEPATITIS C SCREENING  Never done    URINE ACR / MICROALBUMIN  Never done    DIABETES MONOFILAMENT / LE EXAM  Never done    IMM PNEUMOCOCCAL VACCINE: 0-64 Years (1 - PCV) Never done    RETINAL SCREENING  Never done    IMM DTaP/Tdap/Td Vaccine (1 - Tdap) Never done    COLORECTAL CANCER SCREENING  Never done    IMM ZOSTER VACCINES (1 of 2) Never done    COVID-19 Vaccine (2 - Pfizer series) 10/21/2021    IMM INFLUENZA (1) Never done     5.  Reviewed/Updated the following with patient:          Preferred Pharmacy? No          Preferred Lab? No          Preferred Communication? No          Allergies? No          Medications? NO          Social History? No          Family History (document living status of immediate family members and if + hx of  cancer, diabetes, hypertension, hyperlipidemia, heart attack, stroke) No    6.  Updated Care Team?          DME Company (gait device, O2, CPAP, etc.) NO          Other Specialists (eye doctor, derm, GYN, cardiology, endo, etc): NO    7.  AHA (Puls[employer]) form printed for Provider? N/A

## 2022-09-27 NOTE — TELEPHONE ENCOUNTER
Left message for patient to call back regarding pre-visit planning. Please transfer call to [phone].

## 2022-09-28 PROBLEM — E78.00 HYPERCHOLESTEROLEMIA: Status: ACTIVE | Noted: 2022-03-08

## 2022-10-05 ENCOUNTER — OFFICE VISIT (OUTPATIENT)
Dept: MEDICAL GROUP | Facility: LAB | Age: 57
End: 2022-10-05
Payer: COMMERCIAL

## 2022-10-05 VITALS
TEMPERATURE: 98.2 F | RESPIRATION RATE: 14 BRPM | BODY MASS INDEX: 23.68 KG/M2 | DIASTOLIC BLOOD PRESSURE: 74 MMHG | OXYGEN SATURATION: 97 % | HEART RATE: 82 BPM | WEIGHT: 174.8 LBS | SYSTOLIC BLOOD PRESSURE: 126 MMHG | HEIGHT: 72 IN

## 2022-10-05 DIAGNOSIS — R19.7 DIARRHEA, UNSPECIFIED TYPE: ICD-10-CM

## 2022-10-05 DIAGNOSIS — Z12.11 SCREENING FOR COLORECTAL CANCER: ICD-10-CM

## 2022-10-05 DIAGNOSIS — E11.65 UNCONTROLLED TYPE 2 DIABETES MELLITUS WITH HYPERGLYCEMIA (HCC): ICD-10-CM

## 2022-10-05 DIAGNOSIS — M54.6 CHRONIC MIDLINE THORACIC BACK PAIN: ICD-10-CM

## 2022-10-05 DIAGNOSIS — Z12.12 SCREENING FOR COLORECTAL CANCER: ICD-10-CM

## 2022-10-05 DIAGNOSIS — Z11.59 NEED FOR HEPATITIS C SCREENING TEST: ICD-10-CM

## 2022-10-05 DIAGNOSIS — K21.9 GASTROESOPHAGEAL REFLUX DISEASE, UNSPECIFIED WHETHER ESOPHAGITIS PRESENT: ICD-10-CM

## 2022-10-05 DIAGNOSIS — Z23 NEED FOR VACCINATION: ICD-10-CM

## 2022-10-05 DIAGNOSIS — G89.29 CHRONIC MIDLINE THORACIC BACK PAIN: ICD-10-CM

## 2022-10-05 PROBLEM — E10.10 DKA, TYPE 1, NOT AT GOAL (HCC): Status: RESOLVED | Noted: 2022-07-28 | Resolved: 2022-10-05

## 2022-10-05 PROBLEM — N18.30 CKD (CHRONIC KIDNEY DISEASE) STAGE 3, GFR 30-59 ML/MIN: Status: RESOLVED | Noted: 2021-11-07 | Resolved: 2022-10-05

## 2022-10-05 PROBLEM — R07.9 CHEST PAIN IN ADULT: Status: RESOLVED | Noted: 2021-11-08 | Resolved: 2022-10-05

## 2022-10-05 PROBLEM — R94.31 ABNORMAL ELECTROCARDIOGRAM (ECG) (EKG): Chronic | Status: RESOLVED | Noted: 2021-11-08 | Resolved: 2022-10-05

## 2022-10-05 PROBLEM — E11.10 DIABETIC KETOACIDOSIS WITHOUT COMA ASSOCIATED WITH TYPE 2 DIABETES MELLITUS (HCC): Status: RESOLVED | Noted: 2021-11-07 | Resolved: 2022-10-05

## 2022-10-05 PROBLEM — N17.9 AKI (ACUTE KIDNEY INJURY) (HCC): Status: RESOLVED | Noted: 2021-11-08 | Resolved: 2022-10-05

## 2022-10-05 PROBLEM — K20.90 ESOPHAGITIS: Status: RESOLVED | Noted: 2022-07-28 | Resolved: 2022-10-05

## 2022-10-05 LAB
HBA1C MFR BLD: 12.4 % (ref 0–5.6)
INT CON NEG: NEGATIVE
INT CON POS: POSITIVE

## 2022-10-05 PROCEDURE — 83036 HEMOGLOBIN GLYCOSYLATED A1C: CPT | Performed by: NURSE PRACTITIONER

## 2022-10-05 PROCEDURE — 99204 OFFICE O/P NEW MOD 45 MIN: CPT | Mod: 25 | Performed by: NURSE PRACTITIONER

## 2022-10-05 RX ORDER — PANTOPRAZOLE SODIUM 20 MG/1
40 TABLET, DELAYED RELEASE ORAL DAILY
Qty: 180 TABLET | Refills: 0 | Status: SHIPPED | OUTPATIENT
Start: 2022-10-05 | End: 2023-01-03

## 2022-10-05 RX ORDER — AZITHROMYCIN 500 MG/1
500 TABLET, FILM COATED ORAL DAILY
Qty: 3 TABLET | Refills: 0 | Status: SHIPPED | OUTPATIENT
Start: 2022-10-05 | End: 2022-10-08

## 2022-10-05 ASSESSMENT — FIBROSIS 4 INDEX: FIB4 SCORE: 0.8

## 2022-10-05 NOTE — ASSESSMENT & PLAN NOTE
Onset March.   Patient fell off his knee scooter and hit the wall and floor. He was told that he has a compression fracture at T12. He was given pain medication at the time. Reports that pain seems to be worsening, worse with standing.  Rest helps.   Denies numbness/tingling.

## 2022-10-05 NOTE — PROGRESS NOTES
Subjective:     CC:    Chief Complaint   Patient presents with    Establish Care    Flu Vaccine    Follow-Up     FRACTURE     Diarrhea     WATER WAS CONTAMINATED      HISTORY OF THE PRESENT ILLNESS: Patient is a 57 y.o. male. This pleasant patient is here today to establish care and discuss the following:    Diarrhea  Onset 9/27. There was a notice from his apartment complex that the water was contaminated. He would like treatment at this time. He also reports some feeling of general malaise. Denies nausea, vomiting, fever, chills.     Chronic midline thoracic back pain  Onset March.   Patient fell off his knee scooter and hit the wall and floor. He was told that he has a compression fracture at T12. He was given pain medication at the time. Reports that pain seems to be worsening, worse with standing.  Rest helps.   Denies numbness/tingling.    Uncontrolled type 2 diabetes mellitus with hyperglycemia (HCC)  Chronic, uncontrolled condition, new to me.  A1c today is 12.4.  Currently taking insulin as directed.  Denies side effects or hypoglycemic events.  He is monitoring blood sugar regularly at home.   Reports diet is fair.  He is not exercising regularly.  Last eye exam: due  Denies polyuria, polydipsia, blurred vision, or neuropathies.    ROS:   As documented in history of present illness above      Objective:     Exam: /74 (BP Location: Right arm, Patient Position: Sitting, BP Cuff Size: Adult)   Pulse 82   Temp 36.8 °C (98.2 °F)   Resp 14   Ht 1.829 m (6')   Wt 79.3 kg (174 lb 12.8 oz)   SpO2 97%  Body mass index is 23.71 kg/m².    Constitutional: Alert, no distress, well-groomed.  Skin: Warm, dry, good turgor, no rashes in visible areas.  Eye: Equal, round and reactive, conjunctiva clear, lids normal.  ENMT: Lips without lesions, good dentition, moist mucous membranes.  Neck: Trachea midline, no masses, no thyromegaly.  Respiratory: Unlabored respiratory effort, no cough. Clear to ausculation. No  rales, ronchi, or wheezing.  Cardiovascular: Regular rate and rhythm without murmur. Carotid and radial pulses are intact and equal bilaterally.  MSK: Normal gait, moves all extremities.  Neuro: Grossly non-focal.   Psych: Alert and oriented x3, normal affect and mood.    Assessment & Plan:   57 y.o. male with the following -    1. Diarrhea, unspecified type  Patient to take medication as prescribed. Side effects of medication prescribed today were discussed with the patient including how to take the medication and proper dosage. Discussed repercussions of not taking the medication as prescribed. Instructed to call the office should she have any negative side effects or problems with the medication. Supportive care, differential diagnoses, and indications for immediate follow-up discussed with patient. Pathogenesis of diagnosis discussed including typical length and natural progression. Instructed to return to clinic or nearest emergency department for any change in condition, further concerns, or worsening of symptoms.  - azithromycin (ZITHROMAX) 500 MG tablet; Take 1 Tablet by mouth every day for 3 days.  Dispense: 3 Tablet; Refill: 0    2. Uncontrolled type 2 diabetes mellitus with hyperglycemia (HCC)  Diabetes currently uncontrolled.  Patient to take medication as prescribed. Labwork as indicated.  Referral placed to ophthalmology for retinal screening. Discussed importance of diet control, carbohydrates less than 100 g/day.  Patient to limit fruit intake, portion control discussed.  Follow-up as directed.  - POCT  A1C  - CBC WITH DIFFERENTIAL; Future  - Comp Metabolic Panel; Future  - Lipid Profile; Future  - TSH WITH REFLEX TO FT4; Future  - VITAMIN D,25 HYDROXY (DEFICIENCY); Future  - MICROALBUMIN CREAT RATIO URINE; Future  - Referral to Ophthalmology  - insulin 70/30 (HUMULIN 70/30/NOVOLIN 70/30) (70-30) 100 UNIT/ML Suspension; Inject subcutaneously per your sliding scale  Dispense: 10 mL; Refill: 3    3.  Chronic midline thoracic back pain  X-ray ordered. Discussed referral to neurosurgery in the future.   - DX-THORACIC SPINE-2 VIEWS; Future    4. Gastroesophageal reflux disease, unspecified whether esophagitis present  This is a chronic and stable problem.  Patient is doing well.  No red flags.  Continue PPI and monitor.  - pantoprazole (PROTONIX) 20 MG tablet; Take 2 Tablets by mouth every day for 90 days.  Dispense: 180 Tablet; Refill: 0    5. Need for hepatitis C screening test  Labs ordered.   - HEP C VIRUS ANTIBODY; Future    6. Screening for colorectal cancer  Cologuard ordered.   - COLOGUARD (FIT DNA)    7. Need for vaccination  Patient was agreeable to receiving the flu vaccine in clinic today after discussion of potential risks, benefits, and side effects. Vaccine administered without adverse effects.  - Influenza Vaccine Quad Injection (PF)

## 2022-10-05 NOTE — ASSESSMENT & PLAN NOTE
Chronic, uncontrolled condition, new to me.  A1c today is 12.4.  Currently taking insulin as directed.  Denies side effects or hypoglycemic events.  He is monitoring blood sugar regularly at home.   Reports diet is fair.  He is not exercising regularly.  Last eye exam: due  Denies polyuria, polydipsia, blurred vision, or neuropathies.

## 2022-10-07 ENCOUNTER — HOSPITAL ENCOUNTER (OUTPATIENT)
Dept: LAB | Facility: MEDICAL CENTER | Age: 57
End: 2022-10-07
Attending: NURSE PRACTITIONER
Payer: COMMERCIAL

## 2022-10-07 ENCOUNTER — APPOINTMENT (OUTPATIENT)
Dept: RADIOLOGY | Facility: MEDICAL CENTER | Age: 57
End: 2022-10-07
Attending: NURSE PRACTITIONER
Payer: COMMERCIAL

## 2022-10-07 DIAGNOSIS — E11.65 UNCONTROLLED TYPE 2 DIABETES MELLITUS WITH HYPERGLYCEMIA (HCC): ICD-10-CM

## 2022-10-07 DIAGNOSIS — Z11.59 NEED FOR HEPATITIS C SCREENING TEST: ICD-10-CM

## 2022-10-07 DIAGNOSIS — M54.6 CHRONIC MIDLINE THORACIC BACK PAIN: ICD-10-CM

## 2022-10-07 DIAGNOSIS — G89.29 CHRONIC MIDLINE THORACIC BACK PAIN: ICD-10-CM

## 2022-10-07 LAB
25(OH)D3 SERPL-MCNC: 23 NG/ML (ref 30–100)
ALBUMIN SERPL BCP-MCNC: 3.9 G/DL (ref 3.2–4.9)
ALBUMIN/GLOB SERPL: 1.3 G/DL
ALP SERPL-CCNC: 97 U/L (ref 30–99)
ALT SERPL-CCNC: 15 U/L (ref 2–50)
ANION GAP SERPL CALC-SCNC: 8 MMOL/L (ref 7–16)
AST SERPL-CCNC: 12 U/L (ref 12–45)
BASOPHILS # BLD AUTO: 0.2 % (ref 0–1.8)
BASOPHILS # BLD: 0.01 K/UL (ref 0–0.12)
BILIRUB SERPL-MCNC: 0.4 MG/DL (ref 0.1–1.5)
BUN SERPL-MCNC: 20 MG/DL (ref 8–22)
CALCIUM SERPL-MCNC: 9.2 MG/DL (ref 8.5–10.5)
CHLORIDE SERPL-SCNC: 98 MMOL/L (ref 96–112)
CHOLEST SERPL-MCNC: 226 MG/DL (ref 100–199)
CO2 SERPL-SCNC: 30 MMOL/L (ref 20–33)
CREAT SERPL-MCNC: 0.86 MG/DL (ref 0.5–1.4)
CREAT UR-MCNC: 68.41 MG/DL
EOSINOPHIL # BLD AUTO: 0.15 K/UL (ref 0–0.51)
EOSINOPHIL NFR BLD: 2.3 % (ref 0–6.9)
ERYTHROCYTE [DISTWIDTH] IN BLOOD BY AUTOMATED COUNT: 40.6 FL (ref 35.9–50)
FASTING STATUS PATIENT QL REPORTED: NORMAL
GFR SERPLBLD CREATININE-BSD FMLA CKD-EPI: 100 ML/MIN/1.73 M 2
GLOBULIN SER CALC-MCNC: 2.9 G/DL (ref 1.9–3.5)
GLUCOSE SERPL-MCNC: 367 MG/DL (ref 65–99)
HCT VFR BLD AUTO: 43.1 % (ref 42–52)
HCV AB SER QL: NORMAL
HDLC SERPL-MCNC: 88 MG/DL
HGB BLD-MCNC: 14.1 G/DL (ref 14–18)
IMM GRANULOCYTES # BLD AUTO: 0.02 K/UL (ref 0–0.11)
IMM GRANULOCYTES NFR BLD AUTO: 0.3 % (ref 0–0.9)
LDLC SERPL CALC-MCNC: 124 MG/DL
LYMPHOCYTES # BLD AUTO: 1.63 K/UL (ref 1–4.8)
LYMPHOCYTES NFR BLD: 24.8 % (ref 22–41)
MCH RBC QN AUTO: 27.9 PG (ref 27–33)
MCHC RBC AUTO-ENTMCNC: 32.7 G/DL (ref 33.7–35.3)
MCV RBC AUTO: 85.3 FL (ref 81.4–97.8)
MICROALBUMIN UR-MCNC: 7.7 MG/DL
MICROALBUMIN/CREAT UR: 113 MG/G (ref 0–30)
MONOCYTES # BLD AUTO: 0.44 K/UL (ref 0–0.85)
MONOCYTES NFR BLD AUTO: 6.7 % (ref 0–13.4)
NEUTROPHILS # BLD AUTO: 4.31 K/UL (ref 1.82–7.42)
NEUTROPHILS NFR BLD: 65.7 % (ref 44–72)
NRBC # BLD AUTO: 0 K/UL
NRBC BLD-RTO: 0 /100 WBC
PLATELET # BLD AUTO: 243 K/UL (ref 164–446)
PMV BLD AUTO: 9.4 FL (ref 9–12.9)
POTASSIUM SERPL-SCNC: 4.6 MMOL/L (ref 3.6–5.5)
PROT SERPL-MCNC: 6.8 G/DL (ref 6–8.2)
RBC # BLD AUTO: 5.05 M/UL (ref 4.7–6.1)
SODIUM SERPL-SCNC: 136 MMOL/L (ref 135–145)
TRIGL SERPL-MCNC: 70 MG/DL (ref 0–149)
TSH SERPL DL<=0.005 MIU/L-ACNC: 1.58 UIU/ML (ref 0.38–5.33)
WBC # BLD AUTO: 6.6 K/UL (ref 4.8–10.8)

## 2022-10-07 PROCEDURE — 82043 UR ALBUMIN QUANTITATIVE: CPT

## 2022-10-07 PROCEDURE — 82306 VITAMIN D 25 HYDROXY: CPT

## 2022-10-07 PROCEDURE — 36415 COLL VENOUS BLD VENIPUNCTURE: CPT

## 2022-10-07 PROCEDURE — 85025 COMPLETE CBC W/AUTO DIFF WBC: CPT

## 2022-10-07 PROCEDURE — 80061 LIPID PANEL: CPT

## 2022-10-07 PROCEDURE — 82570 ASSAY OF URINE CREATININE: CPT

## 2022-10-07 PROCEDURE — 80053 COMPREHEN METABOLIC PANEL: CPT

## 2022-10-07 PROCEDURE — 84443 ASSAY THYROID STIM HORMONE: CPT

## 2022-10-07 PROCEDURE — 72070 X-RAY EXAM THORAC SPINE 2VWS: CPT

## 2022-10-07 PROCEDURE — 86803 HEPATITIS C AB TEST: CPT

## 2022-10-11 PROCEDURE — 90471 IMMUNIZATION ADMIN: CPT | Performed by: NURSE PRACTITIONER

## 2022-10-11 PROCEDURE — 90686 IIV4 VACC NO PRSV 0.5 ML IM: CPT | Performed by: NURSE PRACTITIONER

## 2022-10-12 ENCOUNTER — OFFICE VISIT (OUTPATIENT)
Dept: MEDICAL GROUP | Facility: LAB | Age: 57
End: 2022-10-12
Payer: COMMERCIAL

## 2022-10-12 ENCOUNTER — TELEPHONE (OUTPATIENT)
Dept: MEDICAL GROUP | Facility: LAB | Age: 57
End: 2022-10-12
Payer: COMMERCIAL

## 2022-10-12 VITALS
SYSTOLIC BLOOD PRESSURE: 96 MMHG | DIASTOLIC BLOOD PRESSURE: 52 MMHG | HEIGHT: 72 IN | RESPIRATION RATE: 14 BRPM | WEIGHT: 168 LBS | BODY MASS INDEX: 22.75 KG/M2 | HEART RATE: 86 BPM | TEMPERATURE: 97.4 F | OXYGEN SATURATION: 96 %

## 2022-10-12 DIAGNOSIS — M54.6 CHRONIC MIDLINE THORACIC BACK PAIN: ICD-10-CM

## 2022-10-12 DIAGNOSIS — S22.080S T12 COMPRESSION FRACTURE, SEQUELA: ICD-10-CM

## 2022-10-12 DIAGNOSIS — R19.7 DIARRHEA, UNSPECIFIED TYPE: ICD-10-CM

## 2022-10-12 DIAGNOSIS — G89.29 CHRONIC MIDLINE THORACIC BACK PAIN: ICD-10-CM

## 2022-10-12 PROBLEM — E78.5 DYSLIPIDEMIA: Status: ACTIVE | Noted: 2022-03-08

## 2022-10-12 PROCEDURE — 99214 OFFICE O/P EST MOD 30 MIN: CPT | Performed by: NURSE PRACTITIONER

## 2022-10-12 RX ORDER — TRAMADOL HYDROCHLORIDE 50 MG/1
50 TABLET ORAL EVERY 8 HOURS PRN
Qty: 42 TABLET | Refills: 0 | Status: SHIPPED | OUTPATIENT
Start: 2022-10-12 | End: 2022-10-17

## 2022-10-12 RX ORDER — AZITHROMYCIN 500 MG/1
500 TABLET, FILM COATED ORAL DAILY
Qty: 3 TABLET | Refills: 0 | Status: SHIPPED | OUTPATIENT
Start: 2022-10-12 | End: 2022-10-15

## 2022-10-12 ASSESSMENT — FIBROSIS 4 INDEX: FIB4 SCORE: 0.74

## 2022-10-12 NOTE — PROGRESS NOTES
Subjective:     CC:   Chief Complaint   Patient presents with    Follow-Up     X ray & Lab results        HPI:   Christoph presents today with the following:    Chronic midline thoracic back pain  Chronic condition.  Patient fell off his knee scooter and had a fall in the floor in March.  At the time he was told he had a compression fracture at T12.  Pain is worsening, worse with standing.  He has tried taking Tylenol and ibuprofen without relief.    X-ray 10/7  1.  Again seen T12 compression fracture with 40% height loss anteriorly. This has mildly progressed from comparison CT scan.  2.  Scoliosis and kyphosis.  3.  Multilevel degenerative disc disease.    Diarrhea  Patient was seen for this previously.  Onset 9/27.  There is a notice from his apartment complex that the water was contaminated.  He has been feeling some general malaise and intermittent diarrhea.  Denies nausea, vomiting, fever, chills, blood in stool.  He was unable to fill his prescription after last appointment, would like to resend it.    ROS:   As documented in history of present illness above    Objective:     Exam: BP (!) 96/52 (BP Location: Right arm, Patient Position: Sitting, BP Cuff Size: Adult)   Pulse 86   Temp 36.3 °C (97.4 °F)   Resp 14   Ht 1.829 m (6')   Wt 76.2 kg (168 lb)   SpO2 96%  Body mass index is 22.78 kg/m².    Constitutional: Alert, no distress, well-groomed.  Skin: Warm, dry, good turgor, no rashes in visible areas.  Eye: Equal, round and reactive, conjunctiva clear, lids normal.  ENMT: Lips without lesions, good dentition, moist mucous membranes.  Neck: Trachea midline, no masses, no thyromegaly.  Respiratory: Unlabored respiratory effort, no cough. Clear to ausculation. No rales, ronchi, or wheezing.  Cardiovascular: Regular rate and rhythm without murmur. Carotid and radial pulses are intact and equal bilaterally.  MSK: Normal gait, moves all extremities.  Neuro: Grossly non-focal.   Psych: Alert and oriented x3,  normal affect and mood.    Assessment & Plan:     57 y.o. male with the following -     1. Diarrhea, unspecified type  Patient to take medication as prescribed. Discussed increasing fluid intake to remain hydrated. Avoid milk, cheese, fatty foods and spicy foods until appetite returns and nausea/vomiting/diarrhea are resolved. Patient to contact this office for a return visit if he develops high fever, significant abdominal pain, bloody stool, weakness, confusion.  - azithromycin (ZITHROMAX) 500 MG tablet; Take 1 Tablet by mouth every day for 3 days.  Dispense: 3 Tablet; Refill: 0    2. Chronic midline thoracic back pain  3. T12 compression fracture, sequela  Referral placed to neurosurgery. Also discussed going [facility]. Patient's pain is currently uncontrolled. Controlled substance discussed with client. Discussed that patient is not to drive or operate machinery on this medicine, not to be used during working hours. Side effects of medication prescribed today were discussed with the patient including how to take the medications and proper dosage. Discussed repercussions of not taking the medication as prescribed. Instructed to call the office should she have any negative side effects or problems with the medication.    In prescribing controlled substances to this patient, I certify that I have obtained and reviewed the medical history of Christoph. I have also made a good leticia effort to obtain applicable records from other providers who have treated the patient and records did not demonstrate any increased risk of substance abuse that would prevent me from prescribing controlled substances.      I have conducted a physical exam and documented it. I have reviewed Christoph’s prescription history as maintained by the [place] Prescription Monitoring Program.      I have assessed the patient’s risk for abuse, dependency, and addiction using the validated Opioid Risk Tool available at  https://www.[employer]Sav[employer].comalc.com/fqomke-ncpu-xtfl-ort-narcotic-abuse.      Given the above, I believe the benefits of controlled substance therapy outweigh the risks. The reasons for prescribing controlled substances include non-narcotic, oral analgesic alternatives have been inadequate for pain control. Accordingly, I have discussed the risk and benefits, treatment plan, and alternative therapies with the patient.     - Referral to Neurosurgery  - traMADol (ULTRAM) 50 MG Tab; Take 1 Tablet by mouth every 8 hours as needed for Moderate Pain for up to 14 days.  Dispense: 42 Tablet; Refill: 0  - Consent for Opiate Prescription

## 2022-10-12 NOTE — TELEPHONE ENCOUNTER
CALLED PT TO NOTIFY . FABIOLA Hawthorne,     Here are your imaging results.  They show that there has been mild progression of the T12 compression fracture.  Also shows that you have some curvature of the spine and degenerative disc disease.  Would you like me to place referral so that you can see neurosurgery?     Thank you,  YESENIA Cervantes

## 2022-10-12 NOTE — ASSESSMENT & PLAN NOTE
Chronic condition.  Patient fell off his knee scooter and had a fall in the floor in March.  At the time he was told he had a compression fracture at T12.  Pain is worsening, worse with standing.  He has tried taking Tylenol and ibuprofen without relief.    X-ray 10/7  1.  Again seen T12 compression fracture with 40% height loss anteriorly. This has mildly progressed from comparison CT scan.  2.  Scoliosis and kyphosis.  3.  Multilevel degenerative disc disease.

## 2022-10-13 ENCOUNTER — HOSPITAL ENCOUNTER (EMERGENCY)
Facility: MEDICAL CENTER | Age: 57
End: 2022-10-13
Attending: EMERGENCY MEDICINE
Payer: COMMERCIAL

## 2022-10-13 ENCOUNTER — APPOINTMENT (OUTPATIENT)
Dept: RADIOLOGY | Facility: MEDICAL CENTER | Age: 57
End: 2022-10-13
Attending: EMERGENCY MEDICINE
Payer: COMMERCIAL

## 2022-10-13 VITALS
OXYGEN SATURATION: 95 % | HEIGHT: 72 IN | TEMPERATURE: 98.1 F | DIASTOLIC BLOOD PRESSURE: 85 MMHG | RESPIRATION RATE: 14 BRPM | HEART RATE: 89 BPM | SYSTOLIC BLOOD PRESSURE: 136 MMHG | WEIGHT: 168.21 LBS | BODY MASS INDEX: 22.78 KG/M2

## 2022-10-13 DIAGNOSIS — M54.6 CHRONIC MIDLINE THORACIC BACK PAIN: ICD-10-CM

## 2022-10-13 DIAGNOSIS — S22.080A CLOSED WEDGE COMPRESSION FRACTURE OF T12 VERTEBRA, INITIAL ENCOUNTER (HCC): ICD-10-CM

## 2022-10-13 DIAGNOSIS — R73.9 HYPERGLYCEMIA: ICD-10-CM

## 2022-10-13 DIAGNOSIS — S22.030A COMPRESSION FRACTURE OF T3 VERTEBRA, INITIAL ENCOUNTER (HCC): ICD-10-CM

## 2022-10-13 DIAGNOSIS — R19.7 DIARRHEA, UNSPECIFIED TYPE: ICD-10-CM

## 2022-10-13 DIAGNOSIS — G89.29 CHRONIC MIDLINE THORACIC BACK PAIN: ICD-10-CM

## 2022-10-13 LAB
ALBUMIN SERPL BCP-MCNC: 3.6 G/DL (ref 3.2–4.9)
ALBUMIN/GLOB SERPL: 1.3 G/DL
ALP SERPL-CCNC: 93 U/L (ref 30–99)
ALT SERPL-CCNC: 12 U/L (ref 2–50)
ANION GAP SERPL CALC-SCNC: 11 MMOL/L (ref 7–16)
APPEARANCE UR: CLEAR
AST SERPL-CCNC: 9 U/L (ref 12–45)
BACTERIA #/AREA URNS HPF: NEGATIVE /HPF
BASOPHILS # BLD AUTO: 0.2 % (ref 0–1.8)
BASOPHILS # BLD: 0.01 K/UL (ref 0–0.12)
BILIRUB SERPL-MCNC: 0.2 MG/DL (ref 0.1–1.5)
BILIRUB UR QL STRIP.AUTO: ABNORMAL
BUN SERPL-MCNC: 25 MG/DL (ref 8–22)
CALCIUM SERPL-MCNC: 8.5 MG/DL (ref 8.4–10.2)
CHLORIDE SERPL-SCNC: 95 MMOL/L (ref 96–112)
CO2 SERPL-SCNC: 25 MMOL/L (ref 20–33)
COLOR UR: YELLOW
CREAT SERPL-MCNC: 1.22 MG/DL (ref 0.5–1.4)
EOSINOPHIL # BLD AUTO: 0.1 K/UL (ref 0–0.51)
EOSINOPHIL NFR BLD: 2 % (ref 0–6.9)
EPI CELLS #/AREA URNS HPF: NEGATIVE /HPF
ERYTHROCYTE [DISTWIDTH] IN BLOOD BY AUTOMATED COUNT: 35.2 FL (ref 35.9–50)
GFR SERPLBLD CREATININE-BSD FMLA CKD-EPI: 69 ML/MIN/1.73 M 2
GLOBULIN SER CALC-MCNC: 2.8 G/DL (ref 1.9–3.5)
GLUCOSE SERPL-MCNC: 490 MG/DL (ref 65–99)
GLUCOSE UR STRIP.AUTO-MCNC: >=1000 MG/DL
HCT VFR BLD AUTO: 38 % (ref 42–52)
HGB BLD-MCNC: 13.2 G/DL (ref 14–18)
IMM GRANULOCYTES # BLD AUTO: 0.02 K/UL (ref 0–0.11)
IMM GRANULOCYTES NFR BLD AUTO: 0.4 % (ref 0–0.9)
KETONES UR STRIP.AUTO-MCNC: 15 MG/DL
LEUKOCYTE ESTERASE UR QL STRIP.AUTO: NEGATIVE
LIPASE SERPL-CCNC: 15 U/L (ref 7–58)
LYMPHOCYTES # BLD AUTO: 1.42 K/UL (ref 1–4.8)
LYMPHOCYTES NFR BLD: 27.7 % (ref 22–41)
MCH RBC QN AUTO: 28 PG (ref 27–33)
MCHC RBC AUTO-ENTMCNC: 34.7 G/DL (ref 33.7–35.3)
MCV RBC AUTO: 80.5 FL (ref 81.4–97.8)
MICRO URNS: ABNORMAL
MONOCYTES # BLD AUTO: 0.42 K/UL (ref 0–0.85)
MONOCYTES NFR BLD AUTO: 8.2 % (ref 0–13.4)
NEUTROPHILS # BLD AUTO: 3.15 K/UL (ref 1.82–7.42)
NEUTROPHILS NFR BLD: 61.5 % (ref 44–72)
NITRITE UR QL STRIP.AUTO: NEGATIVE
NRBC # BLD AUTO: 0 K/UL
NRBC BLD-RTO: 0 /100 WBC
PH UR STRIP.AUTO: 6 [PH] (ref 5–8)
PLATELET # BLD AUTO: 223 K/UL (ref 164–446)
PMV BLD AUTO: 9.2 FL (ref 9–12.9)
POTASSIUM SERPL-SCNC: 4 MMOL/L (ref 3.6–5.5)
PROT SERPL-MCNC: 6.4 G/DL (ref 6–8.2)
PROT UR QL STRIP: 100 MG/DL
RBC # BLD AUTO: 4.72 M/UL (ref 4.7–6.1)
RBC # URNS HPF: ABNORMAL /HPF
RBC UR QL AUTO: NEGATIVE
SODIUM SERPL-SCNC: 131 MMOL/L (ref 135–145)
SP GR UR STRIP.AUTO: 1.02
WBC # BLD AUTO: 5.1 K/UL (ref 4.8–10.8)
WBC #/AREA URNS HPF: ABNORMAL /HPF

## 2022-10-13 PROCEDURE — 81001 URINALYSIS AUTO W/SCOPE: CPT

## 2022-10-13 PROCEDURE — 700111 HCHG RX REV CODE 636 W/ 250 OVERRIDE (IP): Performed by: EMERGENCY MEDICINE

## 2022-10-13 PROCEDURE — 700105 HCHG RX REV CODE 258: Performed by: EMERGENCY MEDICINE

## 2022-10-13 PROCEDURE — 80053 COMPREHEN METABOLIC PANEL: CPT

## 2022-10-13 PROCEDURE — 36415 COLL VENOUS BLD VENIPUNCTURE: CPT

## 2022-10-13 PROCEDURE — 85025 COMPLETE CBC W/AUTO DIFF WBC: CPT

## 2022-10-13 PROCEDURE — 72128 CT CHEST SPINE W/O DYE: CPT

## 2022-10-13 PROCEDURE — 83690 ASSAY OF LIPASE: CPT

## 2022-10-13 PROCEDURE — 96374 THER/PROPH/DIAG INJ IV PUSH: CPT

## 2022-10-13 PROCEDURE — 99284 EMERGENCY DEPT VISIT MOD MDM: CPT

## 2022-10-13 RX ORDER — SODIUM CHLORIDE, SODIUM LACTATE, POTASSIUM CHLORIDE, CALCIUM CHLORIDE 600; 310; 30; 20 MG/100ML; MG/100ML; MG/100ML; MG/100ML
1000 INJECTION, SOLUTION INTRAVENOUS ONCE
Status: COMPLETED | OUTPATIENT
Start: 2022-10-13 | End: 2022-10-13

## 2022-10-13 RX ORDER — MORPHINE SULFATE 4 MG/ML
4 INJECTION INTRAVENOUS ONCE
Status: COMPLETED | OUTPATIENT
Start: 2022-10-13 | End: 2022-10-13

## 2022-10-13 RX ADMIN — MORPHINE SULFATE 4 MG: 4 INJECTION INTRAVENOUS at 22:19

## 2022-10-13 RX ADMIN — SODIUM CHLORIDE, POTASSIUM CHLORIDE, SODIUM LACTATE AND CALCIUM CHLORIDE 1000 ML: 600; 310; 30; 20 INJECTION, SOLUTION INTRAVENOUS at 23:02

## 2022-10-13 ASSESSMENT — PAIN DESCRIPTION - DESCRIPTORS: DESCRIPTORS: ACHING

## 2022-10-13 ASSESSMENT — PAIN SCALES - WONG BAKER: WONGBAKER_NUMERICALRESPONSE: HURTS JUST A LITTLE BIT

## 2022-10-13 ASSESSMENT — FIBROSIS 4 INDEX: FIB4 SCORE: 0.74

## 2022-10-14 NOTE — ED NOTES
Pt given sugar free Sprite. Tolerating well. Review of chart. Pt states he can still feel the pain in his back. Education on pain given. Pt verbalizes understanding.

## 2022-10-14 NOTE — ED NOTES
Voided UA sent to lab  IV LR up and infusing well   Pt in NAD  Pain better after IV med 8/10 now 3/10

## 2022-10-14 NOTE — ED TRIAGE NOTES
"Patient presents from home with complaints of worsening back pain. Was seen by his doctor a couple of days ago and had an X ray done and diagnosed with a fracture in his T12. Reports dizziness and feeling \"unsteady\" and been having diarrhea.  Was advised to come to ER.  "

## 2022-10-14 NOTE — ED PROVIDER NOTES
"ED Provider Note    CHIEF COMPLAINT  Chief Complaint   Patient presents with    Low Back Pain        HPI    Primary care provider: DIANA Tariq   History obtained from: Patient  History limited by: None     Christoph Taylor is a 57 y.o. male who presents to the ED complaining of back pain due to chronic T12 compression fracture.  Patient states that he was diagnosed with T12 compression fracture earlier this year and it has been hurting ever since but has been worsening over the past week without injury/trauma or inciting event.  He denies radiation of his pain or new weakness/sensory change/incontinence/saddle anesthesia.  He reports chronic diabetic neuropathy.  He states that his doctor prescribed a pain medicine but he went to pharmacy and they never mentioned it so he did not  the medicine.  He also has been having diarrhea \"on and off since the start of this year\" and recently received a noticed that there is \"fecal matter in my tapwater.\"  Patient was prescribed Zithromax by his doctor and took his first dose today.  He has not noticed any blood in his stool.  He states that the diarrhea can occur \"just randomly.\"  He denies travels/suspicious oral intake besides the fecal matter in his tapwater or ill contacts.  He denies fever/chills/chest pain/shortness of breath or difficulty breathing/abdominal pain/dysuria/hematuria.  He has had slight nausea at times but no vomiting.  He has not noticed any rash.  He also reports having L5-S1 laminectomy with fusion in [place] when he was in his 30s.  Patient noted to be hypotensive and reports \"it is always low when I come in but my blood pressure is normal when I am at home.\"  Patient reports that his main concern tonight is his back pain and he would like to have a CT scan to evaluate his T12 compression fracture.  He also would like pain medicine.    REVIEW OF SYSTEMS  Please see HPI for pertinent positives/negatives.  All other systems " reviewed and are negative.     PAST MEDICAL HISTORY  Past Medical History:   Diagnosis Date    Esophagitis 7/28/2022    DKA, type 1, not at goal ([facility]) 7/28/2022    Diarrhea 3/18/2022    Abnormal electrocardiogram (ECG) (EKG) 11/8/2021    KRISTAL (acute kidney injury) ([facility]) 11/8/2021    Chest pain in adult 11/8/2021    CKD (chronic kidney disease) stage 3, GFR 30-59 ml/min ([facility]) 11/7/2021    Diabetic ketoacidosis without coma associated with type 2 diabetes mellitus ([facility]) 11/7/2021    Diabetes ([facility])     [place] (hard of hearing)     Very [place] No hearing aides    Hypercholesteremia         SURGICAL HISTORY  Past Surgical History:   Procedure Laterality Date    OTHER      appy, lumbar fusion        SOCIAL HISTORY  Social History     Tobacco Use    Smoking status: Former    Smokeless tobacco: Never   Vaping Use    Vaping Use: Not on file   Substance and Sexual Activity    Alcohol use: Not Currently    Drug use: Not Currently    Sexual activity: Not on file        FAMILY HISTORY  Family History   Problem Relation Age of Onset    Heart Disease Father         CURRENT MEDICATIONS  Home Medications       Reviewed by Priti Savage R.N. (Registered Nurse) on 10/13/22 at 2050  Med List Status: Not Addressed     Medication Last Dose Status   Alcohol Swabs  Active   azithromycin (ZITHROMAX) 500 MG tablet  Active   glucose blood (FREESTYLE LITE) strip  Active   insulin 70/30 (HUMULIN 70/30/NOVOLIN 70/30) (70-30) 100 UNIT/ML Suspension  Active   insulin glargine (LANTUS) 100 UNIT/ML Solution  Active   insulin regular (HUMULIN R) 100 Unit/mL Solution  Active   Insulin Syringes U-100 0.5 mL  Active   Lancets  Active   Multiple Vitamin (ONE-A-DAY MENS) Tab  Active   Naloxone (NARCAN) 4 MG/0.1ML Liquid  Active   pantoprazole (PROTONIX) 20 MG tablet  Active   traMADol (ULTRAM) 50 MG Tab  Active                     ALLERGIES  No Known Allergies     PHYSICAL EXAM  VITAL SIGNS: /85   Pulse 89   Temp 36.7 °C (98.1 °F) (Temporal)   Resp 14    Ht 1.829 m (6')   Wt 76.3 kg (168 lb 3.4 oz)   SpO2 95%   BMI 22.81 kg/m²  @LISA[[id number]::@     Pulse ox interpretation: 98% I interpret this pulse ox as normal     Constitutional: Well developed, well nourished, alert in no apparent distress, nontoxic appearance    HENT: No external signs of trauma, normocephalic, mask on due to COVID-19 pandemic  Eyes: PERRL, conjunctiva without erythema, no discharge, no icterus    Neck: Soft and supple, trachea midline, no stridor, no tenderness, no LAD, no JVD, good ROM    Cardiovascular: Regular rate and rhythm, no murmurs/rubs/gallops, strong distal pulses and good perfusion    Thorax & Lungs: No respiratory distress, CTAB   Abdomen: Soft, nontender, nondistended, no guarding, no rebound, normal BS    Back: No CVAT, lower midline thoracic tenderness to palpation, straight leg raising negative bilaterally, 5/5 strength equal bilateral lower extremities, sensation intact to touch throughout, DTRs 0/4 bilateral lower extremities  Extremities: No cyanosis, no edema, no gross deformity, good ROM, no tenderness, intact distal pulses with brisk cap refill    Skin: Warm, dry, no pallor/cyanosis, no rash noted    Lymphatic: No lymphadenopathy noted    Neuro: A/O times 3, no focal deficits noted    Psychiatric: Cooperative, normal mood and affect, normal judgement, appropriate for clinical situation        DIAGNOSTIC STUDIES / PROCEDURES        LABS  All labs reviewed by me.     Results for orders placed or performed during the hospital encounter of 10/13/22   CBC WITH DIFFERENTIAL   Result Value Ref Range    WBC 5.1 4.8 - 10.8 K/uL    RBC 4.72 4.70 - 6.10 M/uL    Hemoglobin 13.2 (L) 14.0 - 18.0 g/dL    Hematocrit 38.0 (L) 42.0 - 52.0 %    MCV 80.5 (L) 81.4 - 97.8 fL    MCH 28.0 27.0 - 33.0 pg    MCHC 34.7 33.7 - 35.3 g/dL    RDW 35.2 (L) 35.9 - 50.0 fL    Platelet Count 223 164 - 446 K/uL    MPV 9.2 9.0 - 12.9 fL    Neutrophils-Polys 61.50 44.00 - 72.00 %    Lymphocytes 27.70 22.00  - 41.00 %    Monocytes 8.20 0.00 - 13.40 %    Eosinophils 2.00 0.00 - 6.90 %    Basophils 0.20 0.00 - 1.80 %    Immature Granulocytes 0.40 0.00 - 0.90 %    Nucleated RBC 0.00 /100 WBC    Neutrophils (Absolute) 3.15 1.82 - 7.42 K/uL    Lymphs (Absolute) 1.42 1.00 - 4.80 K/uL    Monos (Absolute) 0.42 0.00 - 0.85 K/uL    Eos (Absolute) 0.10 0.00 - 0.51 K/uL    Baso (Absolute) 0.01 0.00 - 0.12 K/uL    Immature Granulocytes (abs) 0.02 0.00 - 0.11 K/uL    NRBC (Absolute) 0.00 K/uL   COMP METABOLIC PANEL   Result Value Ref Range    Sodium 131 (L) 135 - 145 mmol/L    Potassium 4.0 3.6 - 5.5 mmol/L    Chloride 95 (L) 96 - 112 mmol/L    Co2 25 20 - 33 mmol/L    Anion Gap 11.0 7.0 - 16.0    Glucose 490 (H) 65 - 99 mg/dL    Bun 25 (H) 8 - 22 mg/dL    Creatinine 1.22 0.50 - 1.40 mg/dL    Calcium 8.5 8.4 - 10.2 mg/dL    AST(SGOT) 9 (L) 12 - 45 U/L    ALT(SGPT) 12 2 - 50 U/L    Alkaline Phosphatase 93 30 - 99 U/L    Total Bilirubin 0.2 0.1 - 1.5 mg/dL    Albumin 3.6 3.2 - 4.9 g/dL    Total Protein 6.4 6.0 - 8.2 g/dL    Globulin 2.8 1.9 - 3.5 g/dL    A-G Ratio 1.3 g/dL   LIPASE   Result Value Ref Range    Lipase 15 7 - 58 U/L   URINALYSIS (UA)    Specimen: Urine, Clean Catch   Result Value Ref Range    Color Yellow     Character Clear     Specific Gravity 1.020 <1.035    Ph 6.0 5.0 - 8.0    Glucose >=1000 (A) Negative mg/dL    Ketones 15 (A) Negative mg/dL    Protein 100 (A) Negative mg/dL    Bilirubin Small (A) Negative    Nitrite Negative Negative    Leukocyte Esterase Negative Negative    Occult Blood Negative Negative    Micro Urine Req Microscopic    ESTIMATED GFR   Result Value Ref Range    GFR (CKD-EPI) 69 >60 mL/min/1.73 m 2   URINE MICROSCOPIC (W/UA)   Result Value Ref Range    WBC 0-2 (A) /hpf    RBC 0-2 (A) /hpf    Bacteria Negative None /hpf    Epithelial Cells Negative Few /hpf        RADIOLOGY  The radiologist's interpretation of all radiological studies have been reviewed by me.     CT-TSPINE W/O PLUS RECONS   Final  Result      1.  Osteopenia   2.  Multilevel multifactorial degenerative changes   3.  Mild acute appearing T3 superior endplate fracture   4.  Subacute appearing T12 vertebral compression fracture   5.  RIGHT lung airspace disease, incompletely imaged but suspicious for pneumonia             COURSE & MEDICAL DECISION MAKING  Nursing notes, VS, PMSFHx reviewed in chart.     Review of past medical records shows the patient was seen in the office yesterday for follow-up regarding chronic midline thoracic back pain due to previous T12 compression fracture and was prescribed Ultram.  He was prescribed Zithromax for diarrhea.  Patient last seen in this ED September 2, 2022 requesting refill of his insulin.  Patient's last CT abdomen/pelvis on July 28, 2022 had the following findings:    1. Diffuse wall thickening of the distal esophagus could relate to esophagitis, similar to prior.  2. Moderate amount of stool throughout the colon.  3. Horseshoe kidney. No hydronephrosis. No obstructive left renal calculus.      Differential diagnoses considered include but are not limited to: Strain/sprain, DDD, herniated disc, compression Fx, epidural abscess/mass, osteomyelitis, spinal hematoma      History of physical exam as above.  Patient with history of chronic T12 compression fracture and reports worsening pain over the past week without any known injury/trauma/inciting event.  He is requesting imaging study.  CT was performed with findings as above.  Patient denies shortness of breath/difficulty breathing or cough to suggest acute pneumonia.  He is currently on Zithromax for his chronic diarrhea which should also cover for CAP.  He received morphine in the ED with improvement of his back pain.  He received IV fluid for his hyperglycemia and also tolerated oral fluids.  No evidence for DKA.  Pseudohyponatremia due to hyperglycemia.  Findings on the UA also likely due to uncontrolled diabetes without evidence for UTI.  I  discussed the findings with the patient.  This is an alert and pleasant patient who reports feeling better and noted to be in no acute distress and nontoxic in appearance.  At this time, I have low clinical suspicion for emergent pathology such as acute cord syndrome/cauda equina, AAA/dissection, hematoma/abscess, osteomyelitis/discitis.  Record review shows that he has poorly controlled diabetes with hyperglycemia on previous visits.  He was strongly encouraged on outpatient follow-up.  He has Ultram as prescribed by his doctor which he can  at pharmacy.  Return to ED precautions were given.  Patient verbalized understanding and agreed the plan of care with no further questions or concerns.        HYDRATION: Based on the patient's presentation of Hyperglycemia the patient was given IV fluids. IV Hydration was used because oral hydration was not as rapid as required. Upon recheck following hydration, the patient was stable.      The patient is referred to a primary physician for blood pressure management, diabetic screening, and for all other preventative health concerns.       FINAL IMPRESSION  1. Chronic midline thoracic back pain Active   2. Closed wedge compression fracture of T12 vertebra, initial encounter ([facility]) Chronic   3. Compression fracture of T3 vertebra, initial encounter ([facility]) Active   4. Diarrhea, unspecified type Active   5. Hyperglycemia Active          DISPOSITION  Patient will be discharged home in stable condition.       FOLLOW UP  JENNY TariqR.N.  [address] [address] [phone]    Call in 1 day      [facility], Emergency Dept  [address]  [place] [id number] [phone]    If symptoms worsen       OUTPATIENT MEDICATIONS  Discharge Medication List as of 10/13/2022 11:43 PM             Electronically signed by: Ramos Sultana D.O., 10/13/2022 9:59 PM      Portions of this record were made with voice recognition  software.  Despite my review, spelling/grammar/context errors may still remain.  Interpretation of this chart should be taken in this context.

## 2022-10-15 ENCOUNTER — TELEPHONE (OUTPATIENT)
Dept: MEDICAL GROUP | Facility: IMAGING CENTER | Age: 57
End: 2022-10-15
Payer: COMMERCIAL

## 2022-10-15 NOTE — TELEPHONE ENCOUNTER
called back by on call provider.  He reports a fracture in his T12 spine and is experiencing increasing pain, some incontinence of BM with no BM in 1.5 days, saddle anesthesia. No weakness in legs.  He was advised to go to [facility].  He reports he has been driving and feels safe doing so now.

## 2022-10-17 ENCOUNTER — APPOINTMENT (OUTPATIENT)
Dept: MEDICAL GROUP | Facility: LAB | Age: 57
End: 2022-10-17
Payer: COMMERCIAL

## 2022-11-04 ENCOUNTER — TELEPHONE (OUTPATIENT)
Dept: MEDICAL GROUP | Facility: LAB | Age: 57
End: 2022-11-04
Payer: COMMERCIAL

## 2022-11-11 DIAGNOSIS — R73.9 HYPERGLYCEMIA: ICD-10-CM

## 2022-11-11 RX ORDER — LANCETS 30 GAUGE
EACH MISCELLANEOUS
Qty: 100 EACH | Refills: 0 | Status: SHIPPED | OUTPATIENT
Start: 2022-11-11 | End: 2022-11-18 | Stop reason: SDUPTHER

## 2022-11-11 RX ORDER — BLOOD-GLUCOSE METER
KIT MISCELLANEOUS
Qty: 100 STRIP | Refills: 0 | Status: SHIPPED | OUTPATIENT
Start: 2022-11-11 | End: 2022-11-18 | Stop reason: SDUPTHER

## 2022-11-11 NOTE — PROGRESS NOTES
Received request via: Patient    Was the patient seen in the last year in this department? Yes  LOV : 10/17/2022   Does the patient have an active prescription (recently filled or refills available) for medication(s) requested? No    Does the patient have care home Plus and need 100 day supply (blood pressure, diabetes and cholesterol meds only)? Patient does not have SCP

## 2022-11-16 ENCOUNTER — TELEPHONE (OUTPATIENT)
Dept: MEDICAL GROUP | Facility: LAB | Age: 57
End: 2022-11-16
Payer: COMMERCIAL

## 2022-11-16 DIAGNOSIS — E11.65 UNCONTROLLED TYPE 2 DIABETES MELLITUS WITH HYPERGLYCEMIA (HCC): ICD-10-CM

## 2022-11-16 DIAGNOSIS — S22.080A COMPRESSION FRACTURE OF T12 VERTEBRA, INITIAL ENCOUNTER (HCC): ICD-10-CM

## 2022-11-16 RX ORDER — BLOOD-GLUCOSE METER
1 KIT MISCELLANEOUS ONCE
Qty: 1 EACH | Refills: 0 | Status: SHIPPED | OUTPATIENT
Start: 2022-11-16 | End: 2022-11-18 | Stop reason: SDUPTHER

## 2022-11-16 RX ORDER — TRAMADOL HYDROCHLORIDE 50 MG/1
50 TABLET ORAL EVERY 8 HOURS PRN
Qty: 12 TABLET | Refills: 0 | OUTPATIENT
Start: 2022-11-16 | End: 2022-11-20

## 2022-11-16 NOTE — TELEPHONE ENCOUNTER
Received request via: Patient    Was the patient seen in the last year in this department? Yes  LOV :  10/12/2022   Does the patient have an active prescription (recently filled or refills available) for medication(s) requested? No    Does the patient have group home Plus and need 100 day supply (blood pressure, diabetes and cholesterol meds only)? Patient does not have SCP

## 2022-11-17 ENCOUNTER — HOSPITAL ENCOUNTER (OUTPATIENT)
Dept: LAB | Facility: MEDICAL CENTER | Age: 57
End: 2022-11-17
Attending: PHYSICIAN ASSISTANT
Payer: COMMERCIAL

## 2022-11-17 LAB
25(OH)D3 SERPL-MCNC: 23 NG/ML (ref 30–100)
ALBUMIN SERPL BCP-MCNC: 4.2 G/DL (ref 3.2–4.9)
ALBUMIN/GLOB SERPL: 1.7 G/DL
ALP SERPL-CCNC: 78 U/L (ref 30–99)
ALT SERPL-CCNC: 15 U/L (ref 2–50)
ANION GAP SERPL CALC-SCNC: 10 MMOL/L (ref 7–16)
AST SERPL-CCNC: 13 U/L (ref 12–45)
BILIRUB SERPL-MCNC: 0.3 MG/DL (ref 0.1–1.5)
BUN SERPL-MCNC: 27 MG/DL (ref 8–22)
CALCIUM SERPL-MCNC: 9.1 MG/DL (ref 8.5–10.5)
CHLORIDE SERPL-SCNC: 96 MMOL/L (ref 96–112)
CO2 SERPL-SCNC: 26 MMOL/L (ref 20–33)
CREAT SERPL-MCNC: 0.88 MG/DL (ref 0.5–1.4)
GFR SERPLBLD CREATININE-BSD FMLA CKD-EPI: 99 ML/MIN/1.73 M 2
GLOBULIN SER CALC-MCNC: 2.5 G/DL (ref 1.9–3.5)
GLUCOSE SERPL-MCNC: 545 MG/DL (ref 65–99)
MAGNESIUM SERPL-MCNC: 1.6 MG/DL (ref 1.5–2.5)
PHOSPHATE SERPL-MCNC: 4 MG/DL (ref 2.5–4.5)
POTASSIUM SERPL-SCNC: 4.3 MMOL/L (ref 3.6–5.5)
PROT SERPL-MCNC: 6.7 G/DL (ref 6–8.2)
PTH-INTACT SERPL-MCNC: 19.7 PG/ML (ref 14–72)
SODIUM SERPL-SCNC: 132 MMOL/L (ref 135–145)
TSH SERPL DL<=0.005 MIU/L-ACNC: 1.91 UIU/ML (ref 0.38–5.33)

## 2022-11-17 PROCEDURE — 36415 COLL VENOUS BLD VENIPUNCTURE: CPT

## 2022-11-17 PROCEDURE — 80053 COMPREHEN METABOLIC PANEL: CPT

## 2022-11-17 PROCEDURE — 82306 VITAMIN D 25 HYDROXY: CPT

## 2022-11-17 PROCEDURE — 84443 ASSAY THYROID STIM HORMONE: CPT

## 2022-11-17 PROCEDURE — 83970 ASSAY OF PARATHORMONE: CPT

## 2022-11-17 PROCEDURE — 83735 ASSAY OF MAGNESIUM: CPT

## 2022-11-17 PROCEDURE — 84270 ASSAY OF SEX HORMONE GLOBUL: CPT

## 2022-11-17 PROCEDURE — 84402 ASSAY OF FREE TESTOSTERONE: CPT

## 2022-11-17 PROCEDURE — 84403 ASSAY OF TOTAL TESTOSTERONE: CPT

## 2022-11-17 PROCEDURE — 84100 ASSAY OF PHOSPHORUS: CPT

## 2022-11-18 DIAGNOSIS — R73.9 HYPERGLYCEMIA: ICD-10-CM

## 2022-11-18 DIAGNOSIS — E11.65 UNCONTROLLED TYPE 2 DIABETES MELLITUS WITH HYPERGLYCEMIA (HCC): ICD-10-CM

## 2022-11-19 LAB
SHBG SERPL-SCNC: 77 NMOL/L (ref 19–76)
TESTOST FREE MFR SERPL: 1.1 % (ref 1.6–2.9)
TESTOST FREE SERPL-MCNC: 55 PG/ML (ref 47–244)
TESTOST SERPL-MCNC: 510 NG/DL (ref 300–890)

## 2022-11-21 RX ORDER — BLOOD-GLUCOSE METER
KIT MISCELLANEOUS
Qty: 100 STRIP | Refills: 0 | Status: SHIPPED | OUTPATIENT
Start: 2022-11-21 | End: 2023-03-11

## 2022-11-21 RX ORDER — BLOOD-GLUCOSE METER
1 KIT MISCELLANEOUS ONCE
Qty: 1 EACH | Refills: 0 | Status: SHIPPED | OUTPATIENT
Start: 2022-11-21 | End: 2022-11-21

## 2022-11-21 RX ORDER — LANCETS 30 GAUGE
EACH MISCELLANEOUS
Qty: 100 EACH | Refills: 0 | Status: SHIPPED | OUTPATIENT
Start: 2022-11-21 | End: 2023-03-11

## 2022-11-21 NOTE — TELEPHONE ENCOUNTER
Received request via: Pharmacy    Was the patient seen in the last year in this department? Yes  LOV : 10/12/2022  Does the patient have an active prescription (recently filled or refills available) for medication(s) requested? Yes.   Does the patient have nursing home Plus and need 100 day supply (blood pressure, diabetes and cholesterol meds only)? Patient does not have SCP

## 2022-12-08 ENCOUNTER — TELEPHONE (OUTPATIENT)
Dept: MEDICAL GROUP | Facility: LAB | Age: 57
End: 2022-12-08

## 2022-12-08 NOTE — TELEPHONE ENCOUNTER
Pt has upcoming eye appt 1/6/23 1/9/23: pt said he canceled eye appt because of snow and he did not reschedule and does not remember who the appt was with. I let him know to reschedule and to let us know when and where and to have records sent to us when completed.   He agreed.

## 2022-12-23 ENCOUNTER — TELEPHONE (OUTPATIENT)
Dept: MEDICAL GROUP | Facility: LAB | Age: 57
End: 2022-12-23
Payer: COMMERCIAL

## 2022-12-23 NOTE — TELEPHONE ENCOUNTER
RN called stating patient fell. Asking if he has an appointment coming up. Advise the time and date

## 2023-01-02 DIAGNOSIS — E11.65 UNCONTROLLED TYPE 2 DIABETES MELLITUS WITH HYPERGLYCEMIA (HCC): ICD-10-CM

## 2023-01-03 RX ORDER — INSULIN HUMAN 100 [IU]/ML
INJECTION, SUSPENSION SUBCUTANEOUS
Qty: 30 ML | Refills: 3 | Status: ON HOLD | OUTPATIENT
Start: 2023-01-03 | End: 2023-06-16

## 2023-01-03 NOTE — TELEPHONE ENCOUNTER
Received request via: Pharmacy    Was the patient seen in the last year in this department? Yes  10/12/22  Does the patient have an active prescription (recently filled or refills available) for medication(s) requested? No    Does the patient have senior living Plus and need 100 day supply (blood pressure, diabetes and cholesterol meds only)? Medication is not for cholesterol, blood pressure or diabetes

## 2023-01-16 ENCOUNTER — APPOINTMENT (OUTPATIENT)
Dept: RADIOLOGY | Facility: MEDICAL CENTER | Age: 58
End: 2023-01-16
Attending: PHYSICIAN ASSISTANT
Payer: COMMERCIAL

## 2023-01-23 ENCOUNTER — HOSPITAL ENCOUNTER (OUTPATIENT)
Dept: RADIOLOGY | Facility: MEDICAL CENTER | Age: 58
End: 2023-01-23
Attending: PHYSICIAN ASSISTANT
Payer: COMMERCIAL

## 2023-01-23 DIAGNOSIS — S22.080A CLOSED WEDGE COMPRESSION FRACTURE OF T11 VERTEBRA, INITIAL ENCOUNTER (HCC): ICD-10-CM

## 2023-01-23 PROCEDURE — 77080 DXA BONE DENSITY AXIAL: CPT

## 2023-03-11 ENCOUNTER — HOSPITAL ENCOUNTER (INPATIENT)
Facility: MEDICAL CENTER | Age: 58
LOS: 5 days | DRG: 638 | End: 2023-03-16
Attending: EMERGENCY MEDICINE | Admitting: INTERNAL MEDICINE
Payer: COMMERCIAL

## 2023-03-11 ENCOUNTER — APPOINTMENT (OUTPATIENT)
Dept: RADIOLOGY | Facility: MEDICAL CENTER | Age: 58
DRG: 638 | End: 2023-03-11
Attending: EMERGENCY MEDICINE
Payer: COMMERCIAL

## 2023-03-11 DIAGNOSIS — E10.9 INSULIN DEPENDENT DIABETES MELLITUS TYPE IA (HCC): ICD-10-CM

## 2023-03-11 DIAGNOSIS — D72.829 LEUKOCYTOSIS, UNSPECIFIED TYPE: ICD-10-CM

## 2023-03-11 DIAGNOSIS — R10.32 LEFT LOWER QUADRANT ABDOMINAL PAIN: ICD-10-CM

## 2023-03-11 DIAGNOSIS — E11.10 DIABETIC KETOACIDOSIS WITHOUT COMA ASSOCIATED WITH TYPE 2 DIABETES MELLITUS (HCC): ICD-10-CM

## 2023-03-11 DIAGNOSIS — E87.29 METABOLIC ACIDOSIS, INCREASED ANION GAP: ICD-10-CM

## 2023-03-11 PROBLEM — R10.9 ABDOMINAL PAIN: Status: ACTIVE | Noted: 2023-03-11

## 2023-03-11 PROBLEM — E11.9 DIABETES MELLITUS TYPE 2, INSULIN DEPENDENT (HCC): Status: ACTIVE | Noted: 2022-07-28

## 2023-03-11 PROBLEM — R65.10 SIRS (SYSTEMIC INFLAMMATORY RESPONSE SYNDROME) (HCC): Status: ACTIVE | Noted: 2023-03-11

## 2023-03-11 PROBLEM — Z79.4 DIABETES MELLITUS TYPE 2, INSULIN DEPENDENT (HCC): Status: ACTIVE | Noted: 2022-07-28

## 2023-03-11 LAB
ALBUMIN SERPL BCP-MCNC: 4.1 G/DL (ref 3.2–4.9)
ALBUMIN/GLOB SERPL: 1.3 G/DL
ALP SERPL-CCNC: 104 U/L (ref 30–99)
ALT SERPL-CCNC: 24 U/L (ref 2–50)
ANION GAP SERPL CALC-SCNC: 19 MMOL/L (ref 7–16)
APPEARANCE UR: CLEAR
AST SERPL-CCNC: 14 U/L (ref 12–45)
B-OH-BUTYR SERPL-MCNC: 4.5 MMOL/L (ref 0.02–0.27)
BASE EXCESS BLDV CALC-SCNC: 5 MMOL/L
BASOPHILS # BLD AUTO: 0.1 % (ref 0–1.8)
BASOPHILS # BLD: 0.02 K/UL (ref 0–0.12)
BILIRUB SERPL-MCNC: 0.5 MG/DL (ref 0.1–1.5)
BILIRUB UR QL STRIP.AUTO: ABNORMAL
BODY TEMPERATURE: 36.4 CENTIGRADE
BUN SERPL-MCNC: 39 MG/DL (ref 8–22)
CALCIUM ALBUM COR SERPL-MCNC: 9.7 MG/DL (ref 8.5–10.5)
CALCIUM SERPL-MCNC: 9.8 MG/DL (ref 8.4–10.2)
CHLORIDE SERPL-SCNC: 91 MMOL/L (ref 96–112)
CO2 SERPL-SCNC: 28 MMOL/L (ref 20–33)
COLOR UR: YELLOW
CREAT SERPL-MCNC: 0.84 MG/DL (ref 0.5–1.4)
EOSINOPHIL # BLD AUTO: 0.01 K/UL (ref 0–0.51)
EOSINOPHIL NFR BLD: 0.1 % (ref 0–6.9)
ERYTHROCYTE [DISTWIDTH] IN BLOOD BY AUTOMATED COUNT: 36 FL (ref 35.9–50)
GFR SERPLBLD CREATININE-BSD FMLA CKD-EPI: 101 ML/MIN/1.73 M 2
GLOBULIN SER CALC-MCNC: 3.1 G/DL (ref 1.9–3.5)
GLUCOSE BLD STRIP.AUTO-MCNC: 257 MG/DL (ref 65–99)
GLUCOSE BLD STRIP.AUTO-MCNC: 310 MG/DL (ref 65–99)
GLUCOSE SERPL-MCNC: 358 MG/DL (ref 65–99)
GLUCOSE UR STRIP.AUTO-MCNC: 500 MG/DL
HCO3 BLDV-SCNC: 29 MMOL/L (ref 24–28)
HCT VFR BLD AUTO: 42.8 % (ref 42–52)
HGB BLD-MCNC: 15 G/DL (ref 14–18)
IMM GRANULOCYTES # BLD AUTO: 0.09 K/UL (ref 0–0.11)
IMM GRANULOCYTES NFR BLD AUTO: 0.5 % (ref 0–0.9)
KETONES UR STRIP.AUTO-MCNC: 40 MG/DL
LACTATE SERPL-SCNC: 1 MMOL/L (ref 0.5–2)
LACTATE SERPL-SCNC: 1 MMOL/L (ref 0.5–2)
LACTATE SERPL-SCNC: 1.2 MMOL/L (ref 0.5–2)
LEUKOCYTE ESTERASE UR QL STRIP.AUTO: NEGATIVE
LIPASE SERPL-CCNC: 9 U/L (ref 7–58)
LYMPHOCYTES # BLD AUTO: 1.12 K/UL (ref 1–4.8)
LYMPHOCYTES NFR BLD: 6.4 % (ref 22–41)
MAGNESIUM SERPL-MCNC: 2 MG/DL (ref 1.5–2.5)
MCH RBC QN AUTO: 28 PG (ref 27–33)
MCHC RBC AUTO-ENTMCNC: 35 G/DL (ref 33.7–35.3)
MCV RBC AUTO: 79.9 FL (ref 81.4–97.8)
MICRO URNS: ABNORMAL
MONOCYTES # BLD AUTO: 1.05 K/UL (ref 0–0.85)
MONOCYTES NFR BLD AUTO: 6 % (ref 0–13.4)
NEUTROPHILS # BLD AUTO: 15.31 K/UL (ref 1.82–7.42)
NEUTROPHILS NFR BLD: 86.9 % (ref 44–72)
NITRITE UR QL STRIP.AUTO: NEGATIVE
NRBC # BLD AUTO: 0 K/UL
NRBC BLD-RTO: 0 /100 WBC
PCO2 BLDV: 39 MMHG (ref 41–51)
PH BLDV: 7.49 [PH] (ref 7.31–7.45)
PH UR STRIP.AUTO: 6 [PH] (ref 5–8)
PLATELET # BLD AUTO: 281 K/UL (ref 164–446)
PMV BLD AUTO: 9.1 FL (ref 9–12.9)
PO2 BLDV: 55 MMHG (ref 25–40)
POTASSIUM SERPL-SCNC: 4.1 MMOL/L (ref 3.6–5.5)
PROCALCITONIN SERPL-MCNC: 0.1 NG/ML
PROT SERPL-MCNC: 7.2 G/DL (ref 6–8.2)
PROT UR QL STRIP: NEGATIVE MG/DL
RBC # BLD AUTO: 5.36 M/UL (ref 4.7–6.1)
RBC UR QL AUTO: NEGATIVE
SAO2 % BLDV: 89.5 %
SODIUM SERPL-SCNC: 138 MMOL/L (ref 135–145)
SP GR UR STRIP.AUTO: 1.01
TROPONIN T SERPL-MCNC: 26 NG/L (ref 6–19)
WBC # BLD AUTO: 17.6 K/UL (ref 4.8–10.8)

## 2023-03-11 PROCEDURE — 83605 ASSAY OF LACTIC ACID: CPT | Mod: 91

## 2023-03-11 PROCEDURE — 82010 KETONE BODYS QUAN: CPT

## 2023-03-11 PROCEDURE — 99223 1ST HOSP IP/OBS HIGH 75: CPT | Performed by: INTERNAL MEDICINE

## 2023-03-11 PROCEDURE — 36415 COLL VENOUS BLD VENIPUNCTURE: CPT

## 2023-03-11 PROCEDURE — 51798 US URINE CAPACITY MEASURE: CPT

## 2023-03-11 PROCEDURE — 700105 HCHG RX REV CODE 258: Performed by: INTERNAL MEDICINE

## 2023-03-11 PROCEDURE — 700111 HCHG RX REV CODE 636 W/ 250 OVERRIDE (IP): Performed by: EMERGENCY MEDICINE

## 2023-03-11 PROCEDURE — 93005 ELECTROCARDIOGRAM TRACING: CPT | Performed by: NURSE PRACTITIONER

## 2023-03-11 PROCEDURE — 96375 TX/PRO/DX INJ NEW DRUG ADDON: CPT

## 2023-03-11 PROCEDURE — 700117 HCHG RX CONTRAST REV CODE 255: Performed by: EMERGENCY MEDICINE

## 2023-03-11 PROCEDURE — 99285 EMERGENCY DEPT VISIT HI MDM: CPT

## 2023-03-11 PROCEDURE — 80053 COMPREHEN METABOLIC PANEL: CPT

## 2023-03-11 PROCEDURE — 84145 PROCALCITONIN (PCT): CPT

## 2023-03-11 PROCEDURE — 96376 TX/PRO/DX INJ SAME DRUG ADON: CPT

## 2023-03-11 PROCEDURE — 84484 ASSAY OF TROPONIN QUANT: CPT

## 2023-03-11 PROCEDURE — 700102 HCHG RX REV CODE 250 W/ 637 OVERRIDE(OP): Performed by: NURSE PRACTITIONER

## 2023-03-11 PROCEDURE — 83735 ASSAY OF MAGNESIUM: CPT

## 2023-03-11 PROCEDURE — 770001 HCHG ROOM/CARE - MED/SURG/GYN PRIV*

## 2023-03-11 PROCEDURE — 700111 HCHG RX REV CODE 636 W/ 250 OVERRIDE (IP): Performed by: NURSE PRACTITIONER

## 2023-03-11 PROCEDURE — 83690 ASSAY OF LIPASE: CPT

## 2023-03-11 PROCEDURE — 96374 THER/PROPH/DIAG INJ IV PUSH: CPT

## 2023-03-11 PROCEDURE — 700105 HCHG RX REV CODE 258: Performed by: EMERGENCY MEDICINE

## 2023-03-11 PROCEDURE — 81003 URINALYSIS AUTO W/O SCOPE: CPT

## 2023-03-11 PROCEDURE — 85025 COMPLETE CBC W/AUTO DIFF WBC: CPT

## 2023-03-11 PROCEDURE — 82803 BLOOD GASES ANY COMBINATION: CPT

## 2023-03-11 PROCEDURE — 82962 GLUCOSE BLOOD TEST: CPT

## 2023-03-11 PROCEDURE — 74177 CT ABD & PELVIS W/CONTRAST: CPT

## 2023-03-11 PROCEDURE — 700105 HCHG RX REV CODE 258: Performed by: NURSE PRACTITIONER

## 2023-03-11 RX ORDER — AMOXICILLIN 250 MG
2 CAPSULE ORAL 2 TIMES DAILY PRN
Status: DISCONTINUED | OUTPATIENT
Start: 2023-03-11 | End: 2023-03-16 | Stop reason: HOSPADM

## 2023-03-11 RX ORDER — MORPHINE SULFATE 4 MG/ML
2 INJECTION INTRAVENOUS
Status: DISCONTINUED | OUTPATIENT
Start: 2023-03-11 | End: 2023-03-15

## 2023-03-11 RX ORDER — ACETAMINOPHEN 500 MG
1000 TABLET ORAL EVERY 6 HOURS PRN
Status: DISCONTINUED | OUTPATIENT
Start: 2023-03-16 | End: 2023-03-16 | Stop reason: HOSPADM

## 2023-03-11 RX ORDER — BISACODYL 10 MG
10 SUPPOSITORY, RECTAL RECTAL
Status: DISCONTINUED | OUTPATIENT
Start: 2023-03-11 | End: 2023-03-16 | Stop reason: HOSPADM

## 2023-03-11 RX ORDER — OXYCODONE HYDROCHLORIDE 5 MG/1
5 TABLET ORAL
Status: DISCONTINUED | OUTPATIENT
Start: 2023-03-11 | End: 2023-03-16 | Stop reason: HOSPADM

## 2023-03-11 RX ORDER — ONDANSETRON 2 MG/ML
4 INJECTION INTRAMUSCULAR; INTRAVENOUS ONCE
Status: COMPLETED | OUTPATIENT
Start: 2023-03-11 | End: 2023-03-11

## 2023-03-11 RX ORDER — MORPHINE SULFATE 4 MG/ML
4 INJECTION INTRAVENOUS ONCE
Status: COMPLETED | OUTPATIENT
Start: 2023-03-11 | End: 2023-03-11

## 2023-03-11 RX ORDER — SODIUM CHLORIDE 9 MG/ML
1000 INJECTION, SOLUTION INTRAVENOUS ONCE
Status: COMPLETED | OUTPATIENT
Start: 2023-03-11 | End: 2023-03-11

## 2023-03-11 RX ORDER — SODIUM CHLORIDE 9 MG/ML
INJECTION, SOLUTION INTRAVENOUS CONTINUOUS
Status: DISCONTINUED | OUTPATIENT
Start: 2023-03-11 | End: 2023-03-16 | Stop reason: HOSPADM

## 2023-03-11 RX ORDER — POLYETHYLENE GLYCOL 3350 17 G/17G
1 POWDER, FOR SOLUTION ORAL
Status: DISCONTINUED | OUTPATIENT
Start: 2023-03-11 | End: 2023-03-16 | Stop reason: HOSPADM

## 2023-03-11 RX ORDER — MORPHINE SULFATE 4 MG/ML
2 INJECTION INTRAVENOUS ONCE
Status: DISCONTINUED | OUTPATIENT
Start: 2023-03-11 | End: 2023-03-11

## 2023-03-11 RX ORDER — ENOXAPARIN SODIUM 100 MG/ML
40 INJECTION SUBCUTANEOUS DAILY
Status: DISCONTINUED | OUTPATIENT
Start: 2023-03-11 | End: 2023-03-16 | Stop reason: HOSPADM

## 2023-03-11 RX ORDER — ACETAMINOPHEN 500 MG
1000 TABLET ORAL EVERY 6 HOURS PRN
Status: DISCONTINUED | OUTPATIENT
Start: 2023-03-11 | End: 2023-03-16 | Stop reason: HOSPADM

## 2023-03-11 RX ORDER — OXYCODONE HYDROCHLORIDE 5 MG/1
2.5 TABLET ORAL
Status: DISCONTINUED | OUTPATIENT
Start: 2023-03-11 | End: 2023-03-16 | Stop reason: HOSPADM

## 2023-03-11 RX ADMIN — ONDANSETRON 4 MG: 2 INJECTION INTRAMUSCULAR; INTRAVENOUS at 11:24

## 2023-03-11 RX ADMIN — IOHEXOL 100 ML: 350 INJECTION, SOLUTION INTRAVENOUS at 12:51

## 2023-03-11 RX ADMIN — SODIUM CHLORIDE 1000 ML: 9 INJECTION, SOLUTION INTRAVENOUS at 11:24

## 2023-03-11 RX ADMIN — INSULIN HUMAN 6 UNITS: 100 INJECTION, SOLUTION PARENTERAL at 17:55

## 2023-03-11 RX ADMIN — SODIUM CHLORIDE 1000 ML: 9 INJECTION, SOLUTION INTRAVENOUS at 17:27

## 2023-03-11 RX ADMIN — MORPHINE SULFATE 4 MG: 4 INJECTION INTRAVENOUS at 11:24

## 2023-03-11 RX ADMIN — SODIUM CHLORIDE 1000 ML: 9 INJECTION, SOLUTION INTRAVENOUS at 14:39

## 2023-03-11 RX ADMIN — ENOXAPARIN SODIUM 40 MG: 40 INJECTION SUBCUTANEOUS at 17:55

## 2023-03-11 RX ADMIN — SODIUM CHLORIDE: 9 INJECTION, SOLUTION INTRAVENOUS at 18:45

## 2023-03-11 RX ADMIN — INSULIN HUMAN 5 UNITS: 100 INJECTION, SOLUTION PARENTERAL at 21:10

## 2023-03-11 ASSESSMENT — LIFESTYLE VARIABLES
ON A TYPICAL DAY WHEN YOU DRINK ALCOHOL HOW MANY DRINKS DO YOU HAVE: 1
TOTAL SCORE: 0
HAVE YOU EVER FELT YOU SHOULD CUT DOWN ON YOUR DRINKING: NO
AVERAGE NUMBER OF DAYS PER WEEK YOU HAVE A DRINK CONTAINING ALCOHOL: 2
ALCOHOL_USE: YES
HAVE PEOPLE ANNOYED YOU BY CRITICIZING YOUR DRINKING: NO
EVER FELT BAD OR GUILTY ABOUT YOUR DRINKING: NO
HOW MANY TIMES IN THE PAST YEAR HAVE YOU HAD 5 OR MORE DRINKS IN A DAY: 0
EVER HAD A DRINK FIRST THING IN THE MORNING TO STEADY YOUR NERVES TO GET RID OF A HANGOVER: NO
CONSUMPTION TOTAL: NEGATIVE

## 2023-03-11 ASSESSMENT — ENCOUNTER SYMPTOMS
WEAKNESS: 0
DEPRESSION: 0
INSOMNIA: 0
WHEEZING: 0
NAUSEA: 1
CHILLS: 0
VOMITING: 1
SHORTNESS OF BREATH: 0
SORE THROAT: 0
DIARRHEA: 1
FEVER: 0
ABDOMINAL PAIN: 1
EYE PAIN: 0
MYALGIAS: 0
HEADACHES: 0
PALPITATIONS: 0
COUGH: 0
DIZZINESS: 0

## 2023-03-11 ASSESSMENT — PAIN DESCRIPTION - PAIN TYPE
TYPE: ACUTE PAIN

## 2023-03-11 ASSESSMENT — PATIENT HEALTH QUESTIONNAIRE - PHQ9
3. TROUBLE FALLING OR STAYING ASLEEP OR SLEEPING TOO MUCH: MORE THAN HALF THE DAYS
4. FEELING TIRED OR HAVING LITTLE ENERGY: MORE THAN HALF THE DAYS
1. LITTLE INTEREST OR PLEASURE IN DOING THINGS: SEVERAL DAYS
6. FEELING BAD ABOUT YOURSELF - OR THAT YOU ARE A FAILURE OR HAVE LET YOURSELF OR YOUR FAMILY DOWN: NOT AL ALL
9. THOUGHTS THAT YOU WOULD BE BETTER OFF DEAD, OR OF HURTING YOURSELF: NOT AT ALL
5. POOR APPETITE OR OVEREATING: NOT AT ALL
SUM OF ALL RESPONSES TO PHQ9 QUESTIONS 1 AND 2: 2
SUM OF ALL RESPONSES TO PHQ QUESTIONS 1-9: 7
8. MOVING OR SPEAKING SO SLOWLY THAT OTHER PEOPLE COULD HAVE NOTICED. OR THE OPPOSITE, BEING SO FIGETY OR RESTLESS THAT YOU HAVE BEEN MOVING AROUND A LOT MORE THAN USUAL: NOT AT ALL
7. TROUBLE CONCENTRATING ON THINGS, SUCH AS READING THE NEWSPAPER OR WATCHING TELEVISION: SEVERAL DAYS
2. FEELING DOWN, DEPRESSED, IRRITABLE, OR HOPELESS: SEVERAL DAYS

## 2023-03-11 ASSESSMENT — FIBROSIS 4 INDEX
FIB4 SCORE: 0.87
FIB4 SCORE: 0.59

## 2023-03-11 NOTE — ED NOTES
Erp to bedside, pt attempting to provide urine spec, states unable to provide at this time. Erp aware

## 2023-03-11 NOTE — ED NOTES
"Pt rounding, requesting emesis bag-provided w/o emesis noted. Also states \"it still hurts:-pointed to epigastric aware when asked by nurse to show where. Call light in reach  "

## 2023-03-11 NOTE — ED NOTES
Pt repeating over and over that his stomach feels like its burning.  Pt vomited. Appeared dark and watery.

## 2023-03-11 NOTE — ED PROVIDER NOTES
"ED Provider Note    CHIEF COMPLAINT  Chief Complaint   Patient presents with    Abdominal Pain     7/10 pain stays in lower abdomen       EXTERNAL RECORDS REVIEWED  Outpatient Notes patient follow-up for diabetes in the clinic ER notes notable for evaluation for back pain    HPI/ROS  LIMITATION TO HISTORY   Select: Not particularly talkative due to feeling unwell  OUTSIDE HISTORIAN(S):  EMS reports unremarkable vital signs during transport    Christoph Taylor is a 57 y.o. male who presents with a chief complaint to me of \"I need pain medicine.\"  He started hurting yesterday evening.  Rather sudden onset.  Does not radiate, to the left lower quadrant.  Nothing makes it better, nothing makes it worse.  Denies any flank pain.  Denies any change in urination.  Associate with this at the same time he developed nausea and vomiting.  He has not been checking his blood sugars but tells me that he has been taking his insulin.  Although I see the nurses notes he did say he missed this morning's dose.  He had 1 loose stool.  Does not know the character of this.  There is been no painful urination or other change in urination.  No known fever.  No recent trips or travel.  He lives alone, no known sick contacts.  No suspicious foods.  No recent antibiotics.  There is no other complaint.    PAST MEDICAL HISTORY   has a past medical history of Abnormal electrocardiogram (ECG) (EKG) (11/8/2021), KRISTAL (acute kidney injury) ([facility]) (11/8/2021), Chest pain in adult (11/8/2021), CKD (chronic kidney disease) stage 3, GFR 30-59 ml/min ([facility]) (11/7/2021), Diabetes ([facility]), Diabetic ketoacidosis without coma associated with type 2 diabetes mellitus ([facility]) (11/7/2021), Diarrhea (3/18/2022), DKA, type 1, not at goal ([facility]) (7/28/2022), Esophagitis (7/28/2022), [place] (hard of hearing), and Hypercholesteremia.    SURGICAL HISTORY   has a past surgical history that includes other.    FAMILY HISTORY  Family History   Problem Relation Age of Onset    " Heart Disease Father        SOCIAL HISTORY  Social History     Tobacco Use    Smoking status: Former    Smokeless tobacco: Never   Vaping Use    Vaping Use: Not on file   Substance and Sexual Activity    Alcohol use: Not Currently    Drug use: Not Currently    Sexual activity: Not on file       CURRENT MEDICATIONS  Home Medications       Reviewed by Lamar Hill (Pharmacy Tech) on 03/11/23 at 1058  Med List Status: Complete     Medication Last Dose Status   CALCIUM PO 3/10/2023 Active   insulin 70/30 (HUMULIN 70/30) (70-30) 100 UNIT/ML Suspension 3/10/2023 Active   Multiple Vitamin (ONE-A-DAY MENS) Tab 3/10/2023 Active   VITAMIN D PO 3/10/2023 Active                    ALLERGIES  No Known Allergies    PHYSICAL EXAM  VITAL SIGNS: /85   Pulse 93   Temp 36.4 °C (97.5 °F) (Temporal)   Resp 15   Ht 1.829 m (6')   Wt 90 kg (198 lb 6.6 oz)   SpO2 97%   BMI 26.91 kg/m²    Constitutional: He appears tired and unwell.  HENT: Head is without trauma.  Oropharynx is clear.  Mucous membranes are dry.  Eyes: Sclerae are nonicteric, pupils are equally round.  Neck: Supple with grossly normal range of motion.  Cardiovascular: Heart is regular rate and rhythm without murmur rub or gallop.  Peripheral pulses are intact and symmetric throughout.  Thorax & Lungs: Breathing easily.  Good air movement.  There is no wheeze, rhonchi or rales.  Abdomen: Bowel sounds normal, soft, non-distended, no mass.  No CVA tenderness.  No hepatosplenomegaly.  He is tender in the left lower quadrant.  There is no rebound or guarding however.  Skin: No apparent rash.  I do not see petechiae or purpura.  Extremities: No evidence of acute trauma.  No clubbing, cyanosis, edema, no Homans or cords.  Neurologic: Alert. Moving all extremities.  Intact sensation and strength throughout.  Gait is normal.  Psychiatric: Normal for situation      DIAGNOSTIC STUDIES / PROCEDURES    LABS  Labs Reviewed   CBC WITH DIFFERENTIAL - Abnormal; Notable for  the following components:       Result Value    WBC 17.6 (*)     MCV 79.9 (*)     Neutrophils-Polys 86.90 (*)     Lymphocytes 6.40 (*)     Neutrophils (Absolute) 15.31 (*)     Monos (Absolute) 1.05 (*)     All other components within normal limits   COMP METABOLIC PANEL - Abnormal; Notable for the following components:    Chloride 91 (*)     Anion Gap 19.0 (*)     Glucose 358 (*)     Bun 39 (*)     Alkaline Phosphatase 104 (*)     All other components within normal limits   VENOUS BLOOD GAS - Abnormal; Notable for the following components:    Venous Bg Ph 7.49 (*)     Venous Bg Pco2 39.0 (*)     Venous Bg Po2 55.0 (*)     Venous Bg Hco3 29 (*)     All other components within normal limits   LIPASE   CORRECTED CALCIUM   ESTIMATED GFR   URINALYSIS   BETA-HYDROXYBUTYRIC ACID         RADIOLOGY  I have independently interpreted the diagnostic imaging associated with this visit and am waiting the final reading from the radiologist.   My preliminary interpretation is as follows at 1230: Distended urinary bladder, distended stomach.  Radiologist interpretation:   CT-ABDOMEN-PELVIS WITH   Final Result      1.  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      2.  Diverticulosis without evidence of diverticulitis.            COURSE & MEDICAL DECISION MAKING    ED Observation Status? Yes; I am placing the patient in to an observation status due to a diagnostic uncertainty as well as therapeutic intensity. Patient placed in observation status at 11:10 AM, 3/11/2023.     Observation plan is as follows: We will be giving him IV fluids, antiemetics and analgesia, check laboratories and a CT scan.    Upon Reevaluation, the patient's condition has: not improved; and will be escalated to hospitalization.    Patient discharged from ED Observation status at 1320 (Time) 3/11/2023  (Date).     INITIAL ASSESSMENT, COURSE AND PLAN  Care Narrative: The patient presents with abdominal pain.  He looks quite dehydrated on exam  clinically.  I am also concerned about the fact that he has diabetes, insulin-dependent, and I am concerned about the possibility for DKA as well.  With regards to his belly, he is quite tender left lower quadrant, although not peritoneal, I am worried about process here.  We will check laboratories, CT.  I written him for pain and nausea medicine.  We will give him IV fluids for obvious clinical dehydration as well as concern for DKA.    1250: Laboratories are returning.  Shows a gap acidosis of 19 with glucose of 358.  His venous pH is somewhat alkalemia.  White blood count is notable for being elevated at 17,600.  There is a preponderance of neutrophils at 87%.  I have reviewed his CT scan, but there is still no return from the radiology department.  I have called and spoken with the film room to get this expedited.  Upon recheck, the patient says he is still hurting, however he does look more comfortable, resting when I go into the room.  His belly remains nonsurgical.    1315: CT scan has returned showing no acute inflammatory changes.  Upon recheck, patient's bili remains benign, but continues to have diffuse tenderness.  He is still hurting.  I written for another dose of morphine.  He states his nausea has been improved.  At this point, I would like to put him in the hospital for ongoing IV fluids, serial exams, follow-up cell counts, and follow his gap.    I called and spoke to the hospitalist, Lennie, she will be admitting the patient.  He will come back and talk to the patient as well who is comfortable with the plan.  At this point he has a urinary bladder which is 700 cc but he states he cannot urinate.  I have asked nurses to straight cath if he is unable to give a void.    FINAL DIAGNOSIS  1. Left lower quadrant abdominal pain    2. Leukocytosis, unspecified type    3. Metabolic acidosis, increased anion gap    4. Insulin dependent diabetes mellitus type IA (HCC)           Electronically signed by:  John Toussaint M.D., 3/11/2023 11:14 AM

## 2023-03-11 NOTE — ED TRIAGE NOTES
Pt comes in via ambulance for lower abdominal pain that started at 1800 yesterday.  Pt reports 7/10 pain at this time that doesn't radiate anywhere. Pt speaks in full sentences.  Vitals   HR 90  /85  RR 15  02 97 RA    EMS reports glucose was 338 en route.  Pt reports hx of diabetes and did not take his insulin this morning.

## 2023-03-11 NOTE — DISCHARGE PLANNING
note:  Attempted to talk to pt about discharge planning initial assessment but pt was unable to complete our discusssion as he is somnolent.

## 2023-03-12 PROBLEM — E11.00 HYPEROSMOLAR HYPERGLYCEMIC STATE (HHS) (HCC): Status: RESOLVED | Noted: 2021-11-07 | Resolved: 2023-03-12

## 2023-03-12 PROBLEM — R65.10 SIRS (SYSTEMIC INFLAMMATORY RESPONSE SYNDROME) (HCC): Status: RESOLVED | Noted: 2023-03-11 | Resolved: 2023-03-12

## 2023-03-12 PROBLEM — E11.10 DIABETIC KETOACIDOSIS WITHOUT COMA ASSOCIATED WITH TYPE 2 DIABETES MELLITUS (HCC): Status: RESOLVED | Noted: 2021-11-07 | Resolved: 2023-03-12

## 2023-03-12 LAB
ANION GAP SERPL CALC-SCNC: 14 MMOL/L (ref 7–16)
BASOPHILS # BLD AUTO: 0.1 % (ref 0–1.8)
BASOPHILS # BLD: 0.01 K/UL (ref 0–0.12)
BUN SERPL-MCNC: 35 MG/DL (ref 8–22)
CALCIUM SERPL-MCNC: 8.5 MG/DL (ref 8.4–10.2)
CHLORIDE SERPL-SCNC: 97 MMOL/L (ref 96–112)
CO2 SERPL-SCNC: 28 MMOL/L (ref 20–33)
CREAT SERPL-MCNC: 0.84 MG/DL (ref 0.5–1.4)
EKG IMPRESSION: NORMAL
EOSINOPHIL # BLD AUTO: 0 K/UL (ref 0–0.51)
EOSINOPHIL NFR BLD: 0 % (ref 0–6.9)
ERYTHROCYTE [DISTWIDTH] IN BLOOD BY AUTOMATED COUNT: 40 FL (ref 35.9–50)
EST. AVERAGE GLUCOSE BLD GHB EST-MCNC: 390 MG/DL
GFR SERPLBLD CREATININE-BSD FMLA CKD-EPI: 101 ML/MIN/1.73 M 2
GLUCOSE BLD STRIP.AUTO-MCNC: 211 MG/DL (ref 65–99)
GLUCOSE BLD STRIP.AUTO-MCNC: 211 MG/DL (ref 65–99)
GLUCOSE BLD STRIP.AUTO-MCNC: 216 MG/DL (ref 65–99)
GLUCOSE BLD STRIP.AUTO-MCNC: 217 MG/DL (ref 65–99)
GLUCOSE BLD STRIP.AUTO-MCNC: 253 MG/DL (ref 65–99)
GLUCOSE SERPL-MCNC: 271 MG/DL (ref 65–99)
HBA1C MFR BLD: 15.2 % (ref 4–5.6)
HCT VFR BLD AUTO: 41.7 % (ref 42–52)
HGB BLD-MCNC: 13.8 G/DL (ref 14–18)
IMM GRANULOCYTES # BLD AUTO: 0.1 K/UL (ref 0–0.11)
IMM GRANULOCYTES NFR BLD AUTO: 0.8 % (ref 0–0.9)
LACTATE SERPL-SCNC: 0.8 MMOL/L (ref 0.5–2)
LYMPHOCYTES # BLD AUTO: 0.85 K/UL (ref 1–4.8)
LYMPHOCYTES NFR BLD: 6.5 % (ref 22–41)
MCH RBC QN AUTO: 27.7 PG (ref 27–33)
MCHC RBC AUTO-ENTMCNC: 33.1 G/DL (ref 33.7–35.3)
MCV RBC AUTO: 83.6 FL (ref 81.4–97.8)
MONOCYTES # BLD AUTO: 0.74 K/UL (ref 0–0.85)
MONOCYTES NFR BLD AUTO: 5.7 % (ref 0–13.4)
NEUTROPHILS # BLD AUTO: 11.35 K/UL (ref 1.82–7.42)
NEUTROPHILS NFR BLD: 86.9 % (ref 44–72)
NRBC # BLD AUTO: 0 K/UL
NRBC BLD-RTO: 0 /100 WBC
PLATELET # BLD AUTO: 261 K/UL (ref 164–446)
PMV BLD AUTO: 9.3 FL (ref 9–12.9)
POTASSIUM SERPL-SCNC: 3.9 MMOL/L (ref 3.6–5.5)
RBC # BLD AUTO: 4.99 M/UL (ref 4.7–6.1)
SODIUM SERPL-SCNC: 139 MMOL/L (ref 135–145)
WBC # BLD AUTO: 13.1 K/UL (ref 4.8–10.8)

## 2023-03-12 PROCEDURE — 87040 BLOOD CULTURE FOR BACTERIA: CPT | Mod: 91

## 2023-03-12 PROCEDURE — A9270 NON-COVERED ITEM OR SERVICE: HCPCS | Performed by: NURSE PRACTITIONER

## 2023-03-12 PROCEDURE — 99233 SBSQ HOSP IP/OBS HIGH 50: CPT | Performed by: INTERNAL MEDICINE

## 2023-03-12 PROCEDURE — 770001 HCHG ROOM/CARE - MED/SURG/GYN PRIV*

## 2023-03-12 PROCEDURE — 93010 ELECTROCARDIOGRAM REPORT: CPT | Performed by: INTERNAL MEDICINE

## 2023-03-12 PROCEDURE — 80048 BASIC METABOLIC PNL TOTAL CA: CPT

## 2023-03-12 PROCEDURE — 83605 ASSAY OF LACTIC ACID: CPT

## 2023-03-12 PROCEDURE — 700111 HCHG RX REV CODE 636 W/ 250 OVERRIDE (IP): Performed by: NURSE PRACTITIONER

## 2023-03-12 PROCEDURE — 82962 GLUCOSE BLOOD TEST: CPT

## 2023-03-12 PROCEDURE — 94760 N-INVAS EAR/PLS OXIMETRY 1: CPT

## 2023-03-12 PROCEDURE — 700102 HCHG RX REV CODE 250 W/ 637 OVERRIDE(OP): Performed by: NURSE PRACTITIONER

## 2023-03-12 PROCEDURE — 700102 HCHG RX REV CODE 250 W/ 637 OVERRIDE(OP): Performed by: INTERNAL MEDICINE

## 2023-03-12 PROCEDURE — 83036 HEMOGLOBIN GLYCOSYLATED A1C: CPT

## 2023-03-12 PROCEDURE — 36415 COLL VENOUS BLD VENIPUNCTURE: CPT

## 2023-03-12 PROCEDURE — 700105 HCHG RX REV CODE 258: Performed by: INTERNAL MEDICINE

## 2023-03-12 PROCEDURE — A9270 NON-COVERED ITEM OR SERVICE: HCPCS | Performed by: INTERNAL MEDICINE

## 2023-03-12 PROCEDURE — 85025 COMPLETE CBC W/AUTO DIFF WBC: CPT

## 2023-03-12 RX ORDER — OMEPRAZOLE 20 MG/1
40 CAPSULE, DELAYED RELEASE ORAL DAILY
Status: DISCONTINUED | OUTPATIENT
Start: 2023-03-12 | End: 2023-03-13

## 2023-03-12 RX ADMIN — SODIUM CHLORIDE: 9 INJECTION, SOLUTION INTRAVENOUS at 15:25

## 2023-03-12 RX ADMIN — OMEPRAZOLE 40 MG: 20 CAPSULE, DELAYED RELEASE ORAL at 10:47

## 2023-03-12 RX ADMIN — OXYCODONE 5 MG: 5 TABLET ORAL at 20:07

## 2023-03-12 RX ADMIN — INSULIN HUMAN 3 UNITS: 100 INJECTION, SOLUTION PARENTERAL at 17:03

## 2023-03-12 RX ADMIN — OXYCODONE 5 MG: 5 TABLET ORAL at 00:18

## 2023-03-12 RX ADMIN — INSULIN HUMAN 5 UNITS: 100 INJECTION, SOLUTION PARENTERAL at 06:01

## 2023-03-12 RX ADMIN — OXYCODONE 5 MG: 5 TABLET ORAL at 16:57

## 2023-03-12 RX ADMIN — OXYCODONE 2.5 MG: 5 TABLET ORAL at 08:18

## 2023-03-12 RX ADMIN — INSULIN HUMAN 3 UNITS: 100 INJECTION, SOLUTION PARENTERAL at 20:12

## 2023-03-12 RX ADMIN — INSULIN HUMAN 3 UNITS: 100 INJECTION, SOLUTION PARENTERAL at 10:55

## 2023-03-12 RX ADMIN — SODIUM CHLORIDE: 9 INJECTION, SOLUTION INTRAVENOUS at 23:13

## 2023-03-12 RX ADMIN — SODIUM CHLORIDE: 9 INJECTION, SOLUTION INTRAVENOUS at 05:55

## 2023-03-12 RX ADMIN — ENOXAPARIN SODIUM 40 MG: 40 INJECTION SUBCUTANEOUS at 17:04

## 2023-03-12 RX ADMIN — INSULIN GLARGINE-YFGN 10 UNITS: 100 INJECTION, SOLUTION SUBCUTANEOUS at 10:47

## 2023-03-12 ASSESSMENT — PAIN DESCRIPTION - PAIN TYPE
TYPE: ACUTE PAIN

## 2023-03-12 NOTE — ASSESSMENT & PLAN NOTE
"Thought to be 2/2 DKA but with improvement in BG, his epigastric pain has not improved. Pt reports \"acid\" like feel to his GI tract.  Lipase nl, CT A/P unremarkable  Started PPI, continue  GI cocktail x 1  GI c/s , Dr. Blackwell w/ [facility] consulted, follow recommendations  EGD on 3/14  Getting IV morphine and PO oxy prn pain-still having some abdominal pain and requiring IV pain medications-monitor for side effects such as constipation, altered mental status.  "

## 2023-03-12 NOTE — PROGRESS NOTES
Bedside report received from PENNIE Gómez. Assumed care of patient. Pt resting comfortably in bed with no signs of distress noted. Breathing even and unlabored. Patient reports no needs at this time. Hourly rounding in place. RN aware of last emesis, will continue to monitor patient.

## 2023-03-12 NOTE — PROGRESS NOTES
Hospital Medicine Daily Progress Note    Date of Service  3/12/2023    Chief Complaint  Christoph Taylor is a 57 y.o. male admitted 3/11/2023 with abd pain , n/v    Hospital Course  56 yo w/ hx of DM2 with poor adherence to insulin, prior admissions for DKA, CKD 3, HLD, pw abd pain n/v found to have elev BG in 500s, AG acidosis, b hydroxybutyric acid 4.5, most worrisome for DKA. Given IVFs, insulin with improvement of BG to mid 200s, admitted inpatient for further care.    Interval Problem Update  - BG mid 250s  - n/v x 1 overnight  - ongoing epigastric pain  - will try to ADAT  - IVFs, start lantus 10 units bid    I have discussed this patient's plan of care and discharge plan at IDT rounds today with Case Management, Nursing, Nursing leadership, and other members of the IDT team.    Code Status  Full Code    Disposition  Patient is not medically cleared for discharge.   Anticipate discharge to to home with close outpatient follow-up.  I have placed the appropriate orders for post-discharge needs.    Review of Systems  Review of Systems   All other systems reviewed and are negative.     Physical Exam  Temp:  [36.3 °C (97.3 °F)-36.9 °C (98.4 °F)] 36.7 °C (98.1 °F)  Pulse:  [80-95] 94  Resp:  [14-19] 19  BP: ()/(51-83) 128/70  SpO2:  [84 %-94 %] 90 %    Physical Exam  Vitals and nursing note reviewed.   Constitutional:       Appearance: He is ill-appearing.      Comments: Drowsy, opens eyes and answers questions   HENT:      Head: Normocephalic and atraumatic.      Nose: Nose normal.      Mouth/Throat:      Mouth: Mucous membranes are dry.      Pharynx: Oropharynx is clear.   Eyes:      Extraocular Movements: Extraocular movements intact.      Conjunctiva/sclera: Conjunctivae normal.      Pupils: Pupils are equal, round, and reactive to light.   Cardiovascular:      Rate and Rhythm: Normal rate and regular rhythm.      Heart sounds: No murmur heard.  Pulmonary:      Effort: Pulmonary effort is normal.       Breath sounds: Normal breath sounds.   Abdominal:      General: There is no distension.      Palpations: Abdomen is soft.      Tenderness: There is abdominal tenderness. There is no guarding.   Musculoskeletal:         General: Normal range of motion.      Cervical back: Normal range of motion.      Right lower leg: No edema.      Left lower leg: No edema.   Skin:     General: Skin is warm and dry.   Neurological:      General: No focal deficit present.      Mental Status: Mental status is at baseline.      Comments: drowsy   Psychiatric:      Comments: Depressed affect       Fluids    Intake/Output Summary (Last 24 hours) at 3/12/2023 1345  Last data filed at 3/12/2023 0555  Gross per 24 hour   Intake 360 ml   Output 1625 ml   Net -1265 ml       Laboratory  Recent Labs     03/11/23  1114 03/12/23  0146   WBC 17.6* 13.1*   RBC 5.36 4.99   HEMOGLOBIN 15.0 13.8*   HEMATOCRIT 42.8 41.7*   MCV 79.9* 83.6   MCH 28.0 27.7   MCHC 35.0 33.1*   RDW 36.0 40.0   PLATELETCT 281 261   MPV 9.1 9.3     Recent Labs     03/11/23  1114 03/12/23  0146   SODIUM 138 139   POTASSIUM 4.1 3.9   CHLORIDE 91* 97   CO2 28 28   GLUCOSE 358* 271*   BUN 39* 35*   CREATININE 0.84 0.84   CALCIUM 9.8 8.5                   Imaging  CT-ABDOMEN-PELVIS WITH   Final Result      1.  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      2.  Diverticulosis without evidence of diverticulitis.           Assessment/Plan  * Diabetic ketoacidosis without coma associated with type 2 diabetes mellitus (HCC)  Assessment & Plan  Patient pw abd pain, n/v and dehydration  DKA vs HHS  BG level 595, AG of 19, B hydroxy 4.5, UA w/ small ketones  S/p IVFs and SSI  Initiate lantus 10 units BID  Back in Oct had very similar presentation, A1c 12.7%, issues w/ adherence to medications  ADAT    Abdominal pain- (present on admission)  Assessment & Plan  2/2 DKA  Lipase nl, CT A/P unremarkable  Start PPI as patient reports reflux like sxs as well  Similar presentation in  oct for dka    Diabetes mellitus type 2, insulin dependent (HCC)  Assessment & Plan  -Very poorly controlled, pw DKA  - A1c in the past up to 16, in Oct 12%  -On NovoLog 70/30 outpatient.  -Once diet is advanced, diabetic diet.  -management of DKA as above    Noncompliance with medications- (present on admission)  Assessment & Plan  Diabetes education    Metabolic acidosis, increased anion gap  Assessment & Plan  2/2 ketoacidosis  Gap closed  S/p IVFs         VTE prophylaxis: enoxaparin ppx    I have performed a physical exam and reviewed and updated ROS and Plan today (3/12/2023). In review of yesterday's note (3/11/2023), there are no changes except as documented above.

## 2023-03-12 NOTE — CARE PLAN
The patient is Watcher - Medium risk of patient condition declining or worsening    Shift Goals  Clinical Goals: pt will remain at stated pain goal of 5/10 pain this shift.  Patient Goals: sleep comfortably    Progress made toward(s) clinical / shift goals:      Current PRN medications allow pt to remain at stated comfort goal.        Problem: Pain - Standard  Goal: Alleviation of pain or a reduction in pain to the patient’s comfort goal  Outcome: Progressing     Problem: Knowledge Deficit - Standard  Goal: Patient and family/care givers will demonstrate understanding of plan of care, disease process/condition, diagnostic tests and medications  Outcome: Progressing     Problem: Fall Risk  Goal: Patient will remain free from falls  Outcome: Progressing       Patient is not progressing towards the following goals:

## 2023-03-12 NOTE — ASSESSMENT & PLAN NOTE
-Very poorly controlled, pw DKA  - A1c in the past up to 16, in Oct 12%  -On NovoLog 70/30 outpatient. Getting lantus 15 units BID here, would continue this as outpatient  -diabetic diet.  Monitor with Accu-Cheks and a.m. labs

## 2023-03-12 NOTE — ED NOTES
Spoke to the provider Dr Lucas about the bladder scan results, pending new orders and Latia updated.  Patient transferred to the floor via [facility]

## 2023-03-12 NOTE — ASSESSMENT & PLAN NOTE
Presented with weakness, abd pain and n/v  HHS seems to be likely diagnosis  No obvious signs of infxn  S/p 1L NS bolus, continue IVFs  Start Lantus 10 units daily along with SSI

## 2023-03-12 NOTE — WOUND TEAM
Received wound consult for [facility]ily hands and feet.   Photos reviewed.  Patient has minor abrasions/skin cracking.  Orders written to cleanse area and apply lotion.  No advanced wound care needs at this time.

## 2023-03-12 NOTE — PROGRESS NOTES
Received report from ER Nurse at 1653. Patient to floor via gurney  1720. Patient is drowsy but oriented x 4.Initial v/s taken and recorded. BP is low and O2 sat is only 84, hooked to NC at 3 liters.Complaints of pain to upper part of the abdomen.AFO/Splint noted to LLE.    1727-Bolus NS 1 liter started as per MAR.      1730-Patient up to bathroom x 2 assist, refused any assistive device.Patient voided.UA sample taken and sent to lab.  Bladder scan,  ml.    1755- Due medications given as per MAR  Offered clear liquid diet, tolerated.No n/v as of the present.

## 2023-03-12 NOTE — H&P
Hospital Medicine History & Physical Note    Date of Service  3/11/2023    Primary Care Physician  YESY Tariq.    Consultants  None    Specialist Names: N/A    Code Status  Full Code    Chief Complaint  Chief Complaint   Patient presents with    Abdominal Pain     7/10 pain stays in lower abdomen     History of Presenting Illness  Christoph Taylor is a 57 y.o. male with past medical history of CKD stage III, diabetes mellitus type 2, hard of hearing, and hypercholesterolemia who presented to the ED with chief complaint of abdominal pain.    Reports onset of left upper quadrant discomfort yesterday.  Described as constant, non-radiating, sudden onset.  No aggravating or alleviating factors.  Associated nausea, vomiting, and diarrhea.  1 episode of loose stool.  Denies hematemesis, hematochezia, melena, flank pain, dysuria, urinary frequency/urgency, fevers, and chest pain.  Denies known sick contacts.  No recent antibiotic use.  Denies recent travel or exposure to untreated water.  History of appendectomy.  states he has not been checking his blood sugar but he has been using his NovoLog 70/30 as prescribed.      In ED, BC remarkable for WBC 17.6, MCV 79.9, poly neutrophils 86.9, absolute neutrophils 15.3, lymphocytes 6.4, and monocyte 1.05.  CMP remarkable for chloride 91, anion gap 19, glucose 358, BUN 39, alk phos 104.  Lactic acid negative.  Procalcitonin negative.  VBG showed pH 7.49, CO2 39, PO2 55, HCO3 29.  No evidence of DKA.  CT A/P showed horseshoe kidney with nonobstructive left renal stones and diverticulosis without evidence of diverticulitis.  Given 3 L normal saline, 4 mg Zofran, and 4 mg IV morphine.      Admitted as inpatient due to positive SIRS criteria.      I discussed the plan of care with patient, family, bedside RN, and charge RN.    Review of Systems  Review of Systems   Constitutional:  Negative for chills and fever.   HENT:  Negative for congestion and sore throat.    Eyes:   Negative for pain.   Respiratory:  Negative for cough, shortness of breath and wheezing.    Cardiovascular:  Negative for chest pain, palpitations and leg swelling.   Gastrointestinal:  Positive for abdominal pain, diarrhea, nausea and vomiting.   Genitourinary:  Negative for dysuria, frequency and urgency.   Musculoskeletal:  Negative for myalgias.   Skin:  Negative for rash.   Neurological:  Negative for dizziness, weakness and headaches.   Psychiatric/Behavioral:  Negative for depression. The patient does not have insomnia.      Past Medical History   has a past medical history of Abnormal electrocardiogram (ECG) (EKG) (11/8/2021), KRISTAL (acute kidney injury) ([facility]) (11/8/2021), Chest pain in adult (11/8/2021), CKD (chronic kidney disease) stage 3, GFR 30-59 ml/min ([facility]) (11/7/2021), Diabetes ([facility]), Diabetic ketoacidosis without coma associated with type 2 diabetes mellitus ([facility]) (11/7/2021), Diarrhea (3/18/2022), DKA, type 1, not at goal ([facility]) (7/28/2022), Esophagitis (7/28/2022), [place] (hard of hearing), and Hypercholesteremia.    Surgical History   has a past surgical history that includes other.     Family History  family history includes Heart Disease in his father.   Family history reviewed with patient. There is no family history that is pertinent to the chief complaint.     Social History   reports that he has quit smoking. He has never used smokeless tobacco. He reports that he does not currently use alcohol. He reports that he does not currently use drugs.    Allergies  No Known Allergies    Medications  Prior to Admission Medications   Prescriptions Last Dose Informant Patient Reported? Taking?   CALCIUM PO 3/10/2023 at [facility] Patient Yes Yes   Sig: Take 1 Tablet by mouth every day.   Multiple Vitamin (ONE-A-DAY MENS) Tab 3/10/2023 at [facility] Patient Yes No   Sig: Take 1 Tablet by mouth every day.   VITAMIN D PO 3/10/2023 at [facility] Patient Yes Yes   Sig: Take 1 Tablet by mouth every day.   insulin 70/30 (HUMULIN  70/30) (70-30) 100 UNIT/ML Suspension 3/10/2023 at PM Patient No No   Sig: INJECT SUBCUTANEOUSLY 2 TO 28  UNITS TWICE DAILY PER SLIDING  SCALE (MAX OF 28 UNITS PER DAY)   Patient taking differently: Inject 2-28 Units under the skin 2 times a day. INJECT SUBCUTANEOUSLY 2 TO 28  UNITS TWICE DAILY PER SLIDING  SCALE (MAX OF 28 UNITS PER DAY)      Facility-Administered Medications: None     Physical Exam  Temp:  [36.3 °C (97.3 °F)-36.9 °C (98.4 °F)] 36.9 °C (98.4 °F)  Pulse:  [89-95] 95  Resp:  [15-16] 16  BP: (104-132)/(65-85) 104/65  SpO2:  [84 %-97 %] 91 %  Blood Pressure: 104/65   Temperature: 36.9 °C (98.4 °F)   Pulse: 95   Respiration: 16   Pulse Oximetry: 91 %     Physical Exam  Vitals and nursing note reviewed.   Constitutional:       General: He is not in acute distress.     Appearance: Normal appearance. He is ill-appearing. He is not toxic-appearing.   HENT:      Head: Normocephalic and atraumatic.      Right Ear: External ear normal. Decreased hearing noted.      Left Ear: External ear normal. Decreased hearing noted.      Nose: Nose normal.      Mouth/Throat:      Mouth: Mucous membranes are moist.      Pharynx: Oropharynx is clear.   Eyes:      General: No scleral icterus.     Conjunctiva/sclera: Conjunctivae normal.   Cardiovascular:      Rate and Rhythm: Normal rate and regular rhythm.      Pulses: Normal pulses.      Heart sounds: No murmur heard.  Pulmonary:      Effort: Pulmonary effort is normal. No respiratory distress.      Breath sounds: Normal breath sounds.   Abdominal:      General: Bowel sounds are normal. There is no distension.      Palpations: Abdomen is soft.      Tenderness: There is abdominal tenderness in the right upper quadrant, epigastric area and left upper quadrant. There is no rebound.   Musculoskeletal:         General: Normal range of motion.      Cervical back: Normal range of motion.      Right lower leg: No edema.      Left lower leg: No edema.   Skin:     General: Skin is  warm and dry.      Coloration: Skin is not jaundiced.   Neurological:      General: No focal deficit present.      Mental Status: He is alert and oriented to person, place, and time. Mental status is at baseline.   Psychiatric:         Mood and Affect: Mood normal.         Behavior: Behavior normal.         Thought Content: Thought content normal.         Judgment: Judgment normal.     Laboratory:  Recent Labs     03/11/23  1114   WBC 17.6*   RBC 5.36   HEMOGLOBIN 15.0   HEMATOCRIT 42.8   MCV 79.9*   MCH 28.0   MCHC 35.0   RDW 36.0   PLATELETCT 281   MPV 9.1     Recent Labs     03/11/23  1114   SODIUM 138   POTASSIUM 4.1   CHLORIDE 91*   CO2 28   GLUCOSE 358*   BUN 39*   CREATININE 0.84   CALCIUM 9.8     Recent Labs     03/11/23  1114   ALTSGPT 24   ASTSGOT 14   ALKPHOSPHAT 104*   TBILIRUBIN 0.5   LIPASE 9   GLUCOSE 358*       No results for input(s): NTPROBNP in the last 72 hours.      No results for input(s): TROPONINT in the last 72 hours.    Imaging:  CT-ABDOMEN-PELVIS WITH   Final Result      1.  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      2.  Diverticulosis without evidence of diverticulitis.        no X-Ray or EKG requiring interpretation    Assessment/Plan:  Justification for Admission Status  I anticipate this patient will require at least two midnights for appropriate medical management, necessitating inpatient admission because abdominal pain with positive SIRS criteria.    Patient will need a Med/Surg bed on MEDICAL service .  The need is secondary to abdominal pain with positive SIRS criteria.    * Abdominal pain- (present on admission)  Assessment & Plan  -Reporting nausea, vomiting, and diarrhea (x 1).  -May be secondary to gastroenteritis.    -CT A/P unremarkable.  -Lipase WNL.  -UA pending.  -Ordered PRN pain medication and IV fluids.  -Clear liquid diet.  Advance as tolerated.    SIRS (systemic inflammatory response syndrome) ([facility])  Assessment & Plan  -SIRS criteria identified on  my evaluation include:  Tachycardia, with heart rate greater than 90 BPM and Leukocytosis, with WBC greater than 12,000.  -SIRS is non-infectious, the patient does not have sepsis.  -Procalcitonin negative.  -Lactic acid within normal limits.  -Denies respiratory symptoms.  -CT A/P unremarkable.  -UA pending.    Diabetes mellitus type 2, insulin dependent (HCC)  Assessment & Plan  -With hyperglycemia.  -On NovoLog 70/30 outpatient.  -No evidence of DKA.  -Ordered sliding scale insulin with hypoglycemic protocol.  -Once diet is advanced, diabetic diet.  -Hemoglobin A1c in the morning.    Metabolic acidosis, increased anion gap  Assessment & Plan  -Likely secondary to nausea and vomiting.  -Received 3 L IV fluids in the ED.  -Continue IV fluids.  -Monitor.    VTE prophylaxis: enoxaparin ppx    Patient is being admitted for possible DKA as evidenced by AG acidosis, BG 500s, B hydroxy butyric acid of 4.5, abd pain and n/v. IVFs. Bolus 1 L NS now. Initiate insulin. Pt is non adherent in outpatient setting.  My total time spent caring for the patient on the day of the encounter was 45minutes.   This does not include time spent on separately billable procedures/tests.

## 2023-03-12 NOTE — PROGRESS NOTES
4 Eyes Skin Assessment Completed by PENNIE Rivera and PENNIE Lu.    Head WDL  Ears WDL  Nose WDL  Mouth WDL  Neck WDL  Breast/Chest WDL  Shoulder Blades WDL  Spine WDL  (R) Arm/Elbow/Hand Abrasion,dry wound/calloused  (L) Arm/Elbow/Hand abrasion, dry wound/calloused  Abdomen WDL  Groin WDL  Scrotum/Coccyx/Buttocks WDL  (R) Leg WDL  (L) Leg skin laceration  (R) Heel/Foot/Toe WDL  (L) Heel/Foot/Toe small scab          Devices In Places Blood Pressure Cuff and Pulse Ox      Interventions In Place NC W/Ear Foams    Possible Skin Injury No    Pictures Uploaded Into Epic Yes  Wound Consult Placed Yes  RN Wound Prevention Protocol Ordered No

## 2023-03-12 NOTE — ASSESSMENT & PLAN NOTE
Patient pw abd pain, n/v and dehydration  DKA vs HHS  BG level 595, AG of 19, B hydroxy 4.5, UA w/ small ketones  S/p IVFs and SSI  Back in Oct had very similar presentation, A1c 12.7%, issues w/ adherence to medications  ADAT - tolerating PO  Increase lantus to 15 units BID, would dc on this  Diabetic educator cs

## 2023-03-12 NOTE — CARE PLAN
The patient is Stable - Low risk of patient condition declining or worsening    Shift Goals  Clinical Goals: Patient will be able to demonstrate how to care for wounds,BG control,  and pain remain less than 4.  Patient Goals: Pain control and rest    Progress made toward(s) clinical / shift goals:  Patient has aware of wound cleaning and pain has been controlled to less than 4. Patient has been able to rest all shift, with no signs of distress. Patient's blood sugar has been elevated, but controlled with insulin.       Problem: Pain - Standard  Goal: Alleviation of pain or a reduction in pain to the patient’s comfort goal  Outcome: Progressing     Problem: Knowledge Deficit - Standard  Goal: Patient and family/care givers will demonstrate understanding of plan of care, disease process/condition, diagnostic tests and medications  Outcome: Progressing     Problem: Fall Risk  Goal: Patient will remain free from falls  Outcome: Progressing       Patient is not progressing towards the following goals:

## 2023-03-12 NOTE — ED NOTES
Pt rounded on. Reports pain is not getting better.  When asked about needing to urinate the pt reported they felt confident they could urinate in the toilet but preferred to wait until they got to their assigned room.

## 2023-03-12 NOTE — HOSPITAL COURSE
"Per notes, \"57 y.o. male with past medical history of CKD stage III, diabetes mellitus type 2, hard of hearing, and hypercholesterolemia who presented to the ED with chief complaint of abdominal pain.     Reports onset of left upper quadrant discomfort yesterday.  Described as constant, non-radiating, sudden onset.  No aggravating or alleviating factors.  Associated nausea, vomiting, and diarrhea.  1 episode of loose stool.  Denies hematemesis, hematochezia, melena, flank pain, dysuria, urinary frequency/urgency, fevers, and chest pain.  Denies known sick contacts.  No recent antibiotic use.  Denies recent travel or exposure to untreated water.  History of appendectomy.  states he has not been checking his blood sugar but he has been using his NovoLog 70/30 as prescribed.       In ED, BC remarkable for WBC 17.6, MCV 79.9, poly neutrophils 86.9, absolute neutrophils 15.3, lymphocytes 6.4, and monocyte 1.05.  CMP remarkable for chloride 91, anion gap 19, glucose 358, BUN 39, alk phos 104.  Lactic acid negative.  Procalcitonin negative.  VBG showed pH 7.49, CO2 39, PO2 55, HCO3 29.  No evidence of DKA.  CT A/P showed horseshoe kidney with nonobstructive left renal stones and diverticulosis without evidence of diverticulitis.  Given 3 L normal saline, 4 mg Zofran, and 4 mg IV morphine.       Admitted as inpatient due to positive SIRS criteria.  \"  "

## 2023-03-12 NOTE — PROGRESS NOTES
Pt is resting in bed. Awakes to voice. Pt c/o 5/10 pain to abdomen. Pt declines interventions at this time. RN discussed POC with pt. All questions answered. Fall precautions in place.

## 2023-03-12 NOTE — ASSESSMENT & PLAN NOTE
2/2 ketoacidosis  Gap closed  Continue to monitor very closely as patient will resume eating full diet today   ambulatory

## 2023-03-12 NOTE — ASSESSMENT & PLAN NOTE
-SIRS criteria identified on my evaluation include:  Tachycardia, with heart rate greater than 90 BPM and Leukocytosis, with WBC greater than 12,000.  -SIRS is non-infectious, the patient does not have sepsis.  -Procalcitonin negative.  -Lactic acid within normal limits.  -Denies respiratory symptoms.  -CT A/P unremarkable.  -UA pending.

## 2023-03-13 LAB
ANION GAP SERPL CALC-SCNC: 13 MMOL/L (ref 7–16)
BASOPHILS # BLD AUTO: 0.1 % (ref 0–1.8)
BASOPHILS # BLD: 0.01 K/UL (ref 0–0.12)
BUN SERPL-MCNC: 21 MG/DL (ref 8–22)
CALCIUM SERPL-MCNC: 7.8 MG/DL (ref 8.4–10.2)
CHLORIDE SERPL-SCNC: 100 MMOL/L (ref 96–112)
CO2 SERPL-SCNC: 25 MMOL/L (ref 20–33)
CREAT SERPL-MCNC: 0.72 MG/DL (ref 0.5–1.4)
EOSINOPHIL # BLD AUTO: 0.02 K/UL (ref 0–0.51)
EOSINOPHIL NFR BLD: 0.2 % (ref 0–6.9)
ERYTHROCYTE [DISTWIDTH] IN BLOOD BY AUTOMATED COUNT: 40.6 FL (ref 35.9–50)
GFR SERPLBLD CREATININE-BSD FMLA CKD-EPI: 105 ML/MIN/1.73 M 2
GLUCOSE BLD STRIP.AUTO-MCNC: 183 MG/DL (ref 65–99)
GLUCOSE BLD STRIP.AUTO-MCNC: 186 MG/DL (ref 65–99)
GLUCOSE BLD STRIP.AUTO-MCNC: 203 MG/DL (ref 65–99)
GLUCOSE BLD STRIP.AUTO-MCNC: 224 MG/DL (ref 65–99)
GLUCOSE SERPL-MCNC: 218 MG/DL (ref 65–99)
HCT VFR BLD AUTO: 39.4 % (ref 42–52)
HGB BLD-MCNC: 12.7 G/DL (ref 14–18)
IMM GRANULOCYTES # BLD AUTO: 0.05 K/UL (ref 0–0.11)
IMM GRANULOCYTES NFR BLD AUTO: 0.5 % (ref 0–0.9)
LYMPHOCYTES # BLD AUTO: 1.08 K/UL (ref 1–4.8)
LYMPHOCYTES NFR BLD: 9.9 % (ref 22–41)
MAGNESIUM SERPL-MCNC: 1.9 MG/DL (ref 1.5–2.5)
MCH RBC QN AUTO: 27.5 PG (ref 27–33)
MCHC RBC AUTO-ENTMCNC: 32.2 G/DL (ref 33.7–35.3)
MCV RBC AUTO: 85.5 FL (ref 81.4–97.8)
MONOCYTES # BLD AUTO: 0.76 K/UL (ref 0–0.85)
MONOCYTES NFR BLD AUTO: 7 % (ref 0–13.4)
NEUTROPHILS # BLD AUTO: 9.01 K/UL (ref 1.82–7.42)
NEUTROPHILS NFR BLD: 82.3 % (ref 44–72)
NRBC # BLD AUTO: 0 K/UL
NRBC BLD-RTO: 0 /100 WBC
PLATELET # BLD AUTO: 215 K/UL (ref 164–446)
PMV BLD AUTO: 9.4 FL (ref 9–12.9)
POTASSIUM SERPL-SCNC: 3.9 MMOL/L (ref 3.6–5.5)
RBC # BLD AUTO: 4.61 M/UL (ref 4.7–6.1)
SODIUM SERPL-SCNC: 138 MMOL/L (ref 135–145)
WBC # BLD AUTO: 10.9 K/UL (ref 4.8–10.8)

## 2023-03-13 PROCEDURE — 80048 BASIC METABOLIC PNL TOTAL CA: CPT

## 2023-03-13 PROCEDURE — 83735 ASSAY OF MAGNESIUM: CPT

## 2023-03-13 PROCEDURE — 700111 HCHG RX REV CODE 636 W/ 250 OVERRIDE (IP): Performed by: NURSE PRACTITIONER

## 2023-03-13 PROCEDURE — 770001 HCHG ROOM/CARE - MED/SURG/GYN PRIV*

## 2023-03-13 PROCEDURE — 700105 HCHG RX REV CODE 258: Performed by: INTERNAL MEDICINE

## 2023-03-13 PROCEDURE — 700102 HCHG RX REV CODE 250 W/ 637 OVERRIDE(OP): Performed by: NURSE PRACTITIONER

## 2023-03-13 PROCEDURE — 94760 N-INVAS EAR/PLS OXIMETRY 1: CPT

## 2023-03-13 PROCEDURE — 700102 HCHG RX REV CODE 250 W/ 637 OVERRIDE(OP): Performed by: INTERNAL MEDICINE

## 2023-03-13 PROCEDURE — A9270 NON-COVERED ITEM OR SERVICE: HCPCS | Performed by: NURSE PRACTITIONER

## 2023-03-13 PROCEDURE — 99233 SBSQ HOSP IP/OBS HIGH 50: CPT | Performed by: INTERNAL MEDICINE

## 2023-03-13 PROCEDURE — 36415 COLL VENOUS BLD VENIPUNCTURE: CPT

## 2023-03-13 PROCEDURE — 82962 GLUCOSE BLOOD TEST: CPT | Mod: 91

## 2023-03-13 PROCEDURE — 85025 COMPLETE CBC W/AUTO DIFF WBC: CPT

## 2023-03-13 PROCEDURE — A9270 NON-COVERED ITEM OR SERVICE: HCPCS | Performed by: INTERNAL MEDICINE

## 2023-03-13 RX ORDER — OMEPRAZOLE 20 MG/1
40 CAPSULE, DELAYED RELEASE ORAL 2 TIMES DAILY
Status: DISCONTINUED | OUTPATIENT
Start: 2023-03-13 | End: 2023-03-16 | Stop reason: HOSPADM

## 2023-03-13 RX ORDER — SUCRALFATE ORAL 1 G/10ML
1 SUSPENSION ORAL EVERY 6 HOURS
Status: DISCONTINUED | OUTPATIENT
Start: 2023-03-13 | End: 2023-03-16 | Stop reason: HOSPADM

## 2023-03-13 RX ADMIN — MORPHINE SULFATE 2 MG: 4 INJECTION INTRAVENOUS at 05:40

## 2023-03-13 RX ADMIN — INSULIN HUMAN 3 UNITS: 100 INJECTION, SOLUTION PARENTERAL at 11:03

## 2023-03-13 RX ADMIN — OXYCODONE 5 MG: 5 TABLET ORAL at 17:16

## 2023-03-13 RX ADMIN — SODIUM CHLORIDE: 9 INJECTION, SOLUTION INTRAVENOUS at 22:50

## 2023-03-13 RX ADMIN — OMEPRAZOLE 40 MG: 20 CAPSULE, DELAYED RELEASE ORAL at 05:41

## 2023-03-13 RX ADMIN — LIDOCAINE HYDROCHLORIDE 30 ML: 20 SOLUTION OROPHARYNGEAL at 09:59

## 2023-03-13 RX ADMIN — SUCRALFATE 1 G: 1 SUSPENSION ORAL at 17:54

## 2023-03-13 RX ADMIN — OMEPRAZOLE 40 MG: 20 CAPSULE, DELAYED RELEASE ORAL at 17:53

## 2023-03-13 RX ADMIN — SODIUM CHLORIDE: 9 INJECTION, SOLUTION INTRAVENOUS at 14:38

## 2023-03-13 RX ADMIN — SUCRALFATE 1 G: 1 SUSPENSION ORAL at 23:35

## 2023-03-13 RX ADMIN — OXYCODONE 5 MG: 5 TABLET ORAL at 21:34

## 2023-03-13 RX ADMIN — INSULIN HUMAN 2 UNITS: 100 INJECTION, SOLUTION PARENTERAL at 17:52

## 2023-03-13 RX ADMIN — OXYCODONE 5 MG: 5 TABLET ORAL at 09:59

## 2023-03-13 RX ADMIN — SODIUM CHLORIDE: 9 INJECTION, SOLUTION INTRAVENOUS at 06:25

## 2023-03-13 RX ADMIN — OXYCODONE 5 MG: 5 TABLET ORAL at 04:31

## 2023-03-13 RX ADMIN — INSULIN HUMAN 3 UNITS: 100 INJECTION, SOLUTION PARENTERAL at 06:02

## 2023-03-13 RX ADMIN — INSULIN HUMAN 2 UNITS: 100 INJECTION, SOLUTION PARENTERAL at 21:35

## 2023-03-13 ASSESSMENT — PAIN DESCRIPTION - PAIN TYPE
TYPE: ACUTE PAIN

## 2023-03-13 NOTE — PROGRESS NOTES
Received report from night shift RN, assumed care of pt. Buzz, VSS. Pt states pain 2/10.  Pt denies of tingling and numbness sensation.  Pt updated on plan of care for the day.   All questions answered. No other needs at this time. Call light and belongings within reach, bed locked and in lowest position. Pt educated to call for assistance.Hourly rounding in place.  0930 pt is complaining of nausea and upset stomach    0959 given maalox as ordered.   1100  Given insulin as ordered.

## 2023-03-13 NOTE — DISCHARGE PLANNING
Met with pt. He lives alone and uses a knee scooter. Otherwise, he said that he is independent with ADLs and IADLs.     YESENIA Darnell.     Care Transition Team Assessment    Information Source  Orientation Level: Oriented X4  Information Given By: Patient  Who is responsible for making decisions for patient? : Patient    Readmission Evaluation  Is this a readmission?: No    Elopement Risk  Legal Hold: No  Ambulatory or Self Mobile in Wheelchair: Yes  Disoriented: No  Psychiatric Symptoms: None  History of Wandering: No  Elopement this Admit: No  Vocalizing Wanting to Leave: No  Displays Behaviors, Body Language Wanting to Leave: No-Not at Risk for Elopement  Elopement Risk: Not at Risk for Elopement    Interdisciplinary Discharge Planning  Does Admitting Nurse Feel This Could be a Complex Discharge?: No  Primary Care Physician: Orin PATTERSON  Lives with - Patient's Self Care Capacity: Alone and Able to Care For Self  Patient or legal guardian wants to designate a caregiver: No  Support Systems: Children  Housing / Facility: Mobile Home  Do You Take your Prescribed Medications Regularly: Yes  Able to Return to Previous ADL's: Yes  Mobility Issues: No  Prior Services: None, Home-Independent  Patient Prefers to be Discharged to:: Home  Assistance Needed: No  Durable Medical Equipment: Other - Specify (knee scooter)    Discharge Preparedness  What is your plan after discharge?: Home with help  What are your discharge supports?: Child  Prior Functional Level: Ambulatory, Drives Self, Independent with Activities of Daily Living, Independent with Medication Management  Difficulity with ADLs: None  Difficulity with IADLs: None    Functional Assesment  Prior Functional Level: Ambulatory, Drives Self, Independent with Activities of Daily Living, Independent with Medication Management    Finances  Financial Barriers to Discharge: No  Prescription Coverage: Yes    Vision / Hearing Impairment  Vision Impairment :  No  Hearing Impairment : No    Values / Beliefs / Concerns  Values / Beliefs Concerns : No    Advance Directive  Advance Directive?: None    Domestic Abuse  Have you ever been the victim of abuse or violence?: No  Physical Abuse or Sexual Abuse: No  Verbal Abuse or Emotional Abuse: No  Possible Abuse/Neglect Reported to:: Not Applicable         Discharge Risks or Barriers  Discharge risks or barriers?: No    Anticipated Discharge Information  Discharge Disposition: Discharged to home/self care (01)

## 2023-03-13 NOTE — PROGRESS NOTES
Hospital Medicine Daily Progress Note    Date of Service  3/13/2023    Chief Complaint  Christoph Taylor is a 57 y.o. male admitted 3/11/2023 with abd pain , n/v    Hospital Course  56 yo w/ hx of DM2 with poor adherence to insulin, prior admissions for DKA, CKD 3, HLD, pw abd pain n/v found to have elev BG in 500s, AG acidosis, b hydroxybutyric acid 4.5, c/f DKA. Given IVFs, insulin with improvement of BG to mid 200s, admitted inpatient for further care.    Interval Problem Update  - BG low 200s  - n/v overnight per patient  - per RN Tolerating PO intake  - pt reports ongoing epigastric pain for which he is requiring IV morphine  - d/w [facility] Dr. Blackwell to eval patient for possible EGD  - trial of GI cocktail  - encouraged pt to be out of bed, windows open, light on during day as he is spending it mostly sleeping  - dc IVFs    I have discussed this patient's plan of care and discharge plan at IDT rounds today with Case Management, Nursing, Nursing leadership, and other members of the IDT team.    Code Status  Full Code    Disposition  Patient is not medically cleared for discharge.   Anticipate discharge to to home with close outpatient follow-up.  I have placed the appropriate orders for post-discharge needs.    Review of Systems  Review of Systems   All other systems reviewed and are negative.     Physical Exam  Temp:  [36.5 °C (97.7 °F)-36.8 °C (98.2 °F)] 36.5 °C (97.7 °F)  Pulse:  [84-93] 84  Resp:  [17-18] 18  BP: (120-130)/(72-81) 130/81  SpO2:  [92 %-98 %] 98 %    Physical Exam  Vitals and nursing note reviewed.   Constitutional:       General: He is not in acute distress.     Appearance: He is not ill-appearing.      Comments: Drowsy but arousable  During interview able to sit up and partake in interview   HENT:      Head: Normocephalic and atraumatic.      Nose: Nose normal.      Mouth/Throat:      Mouth: Mucous membranes are dry.      Pharynx: Oropharynx is clear.   Eyes:      Extraocular Movements:  Extraocular movements intact.      Conjunctiva/sclera: Conjunctivae normal.      Pupils: Pupils are equal, round, and reactive to light.   Cardiovascular:      Rate and Rhythm: Normal rate and regular rhythm.      Heart sounds: No murmur heard.  Pulmonary:      Effort: Pulmonary effort is normal.      Breath sounds: Normal breath sounds.   Abdominal:      General: There is no distension.      Palpations: Abdomen is soft.      Tenderness: There is abdominal tenderness. There is no guarding.   Musculoskeletal:         General: Normal range of motion.      Cervical back: Normal range of motion.      Right lower leg: No edema.      Left lower leg: No edema.   Skin:     General: Skin is warm and dry.   Neurological:      General: No focal deficit present.      Mental Status: Mental status is at baseline.      Comments: drowsy   Psychiatric:      Comments: Depressed affect       Fluids    Intake/Output Summary (Last 24 hours) at 3/13/2023 1401  Last data filed at 3/13/2023 0800  Gross per 24 hour   Intake 240 ml   Output --   Net 240 ml       Laboratory  Recent Labs     03/11/23  1114 03/12/23  0146 03/13/23  0157   WBC 17.6* 13.1* 10.9*   RBC 5.36 4.99 4.61*   HEMOGLOBIN 15.0 13.8* 12.7*   HEMATOCRIT 42.8 41.7* 39.4*   MCV 79.9* 83.6 85.5   MCH 28.0 27.7 27.5   MCHC 35.0 33.1* 32.2*   RDW 36.0 40.0 40.6   PLATELETCT 281 261 215   MPV 9.1 9.3 9.4     Recent Labs     03/11/23  1114 03/12/23  0146 03/13/23  0157   SODIUM 138 139 138   POTASSIUM 4.1 3.9 3.9   CHLORIDE 91* 97 100   CO2 28 28 25   GLUCOSE 358* 271* 218*   BUN 39* 35* 21   CREATININE 0.84 0.84 0.72   CALCIUM 9.8 8.5 7.8*                   Imaging  CT-ABDOMEN-PELVIS WITH   Final Result      1.  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      2.  Diverticulosis without evidence of diverticulitis.           Assessment/Plan  * Diabetic ketoacidosis without coma associated with type 2 diabetes mellitus (HCC)  Assessment & Plan  Patient pw abd pain, n/v  "and dehydration  DKA vs HHS  BG level 595, AG of 19, B hydroxy 4.5, UA w/ small ketones  S/p IVFs and SSI  Back in Oct had very similar presentation, A1c 12.7%, issues w/ adherence to medications  ADAT - tolerating PO  Increase lantus to 15 units BID, would dc on this  Diabetic educator cs    Abdominal pain- (present on admission)  Assessment & Plan  Thought to be 2/2 DKA but with improvement in BG, his epigastric pain has not improved. Pt reports \"acid\" like feel to his GI tract.  Lipase nl, CT A/P unremarkable  Started PPI, continue  GI cocktail x 1  GI c/s , Dr. Blackwell w/ [facility] consulted for possible EGD silvana  Getting IV morphine and PO oxy prn pain    Diabetes mellitus type 2, insulin dependent (HCC)  Assessment & Plan  -Very poorly controlled, pw DKA  - A1c in the past up to 16, in Oct 12%  -On NovoLog 70/30 outpatient. Getting lantus 15 units BID here, would continue this as outpatient  -diabetic diet.  -management of DKA as above    Noncompliance with medications- (present on admission)  Assessment & Plan  Diabetes education    Metabolic acidosis, increased anion gap  Assessment & Plan  2/2 ketoacidosis  Gap closed  S/p IVFs         VTE prophylaxis: enoxaparin ppx    I have performed a physical exam and reviewed and updated ROS and Plan today (3/13/2023). In review of yesterday's note (3/12/2023), there are no changes except as documented above.    My total time spent caring for the patient on the day of the encounter was 45 minutes.   This does not include time spent on separately billable procedures/tests.       "

## 2023-03-13 NOTE — CONSULTS
Gastroenterology Consult Note     Date of Consult: 03/13/2023  Referring Physician: Lan Herbert     Reason for consult: epigastric pain         HPI: This is a 56 yo male who presented to the hospital with complaints of epigastric pain. He says that the pain started 3 days ago and is severe and constant. He reports no heartburn or reflux but has had nausea and vomiting. He says that his Bms have been stable without constipation or diarrhea. He denies blood in the stool and reports no melena. He denies NSAID use. He does have a history of DM and usually takes 70/30 insulin. He doesn't routinely check his sugars. On admit, he was found to have DKA and has been treated for this     PMHX:  Past Medical History:   Diagnosis Date    Abnormal electrocardiogram (ECG) (EKG) 11/8/2021    KRISTAL (acute kidney injury) ([facility]) 11/8/2021    Chest pain in adult 11/8/2021    CKD (chronic kidney disease) stage 3, GFR 30-59 ml/min ([facility]) 11/7/2021    Diabetes ([facility])     Diabetic ketoacidosis without coma associated with type 2 diabetes mellitus ([facility]) 11/7/2021    Diarrhea 3/18/2022    DKA, type 1, not at goal ([facility]) 7/28/2022    Esophagitis 7/28/2022    [place] (hard of hearing)     Very [place] No hearing aides    Hypercholesteremia           PSurgHx:   Past Surgical History:   Procedure Laterality Date    OTHER      appy, lumbar fusion        ALLERGIES:Patient has no known allergies.     SocHx:   Social History     Socioeconomic History    Marital status: Single     Spouse name: Not on file    Number of children: Not on file    Years of education: Not on file    Highest education level: Not on file   Occupational History    Not on file   Tobacco Use    Smoking status: Former    Smokeless tobacco: Never   Vaping Use    Vaping Use: Not on file   Substance and Sexual Activity    Alcohol use: Not Currently    Drug use: Not Currently    Sexual activity: Not on file   Other Topics Concern    Not on file   Social  History Narrative    Not on file     Social Determinants of Health     Financial Resource Strain: Not on file   Food Insecurity: Not on file   Transportation Needs: Not on file   Physical Activity: Not on file   Stress: Not on file   Social Connections: Not on file   Intimate Partner Violence: Not on file   Housing Stability: Not on file        FAMHx:   Family History   Problem Relation Age of Onset    Heart Disease Father         ROS:  Constitutional: No fevers, chills, no night sweats, no weight changes  HEENT: no vision or hearing changes, no dry mouth, no change in smell  CARDIO: no palpitations, no orthopnea, no chest pain  PULM: no cough, no shortness of breath  NEURO: no Seizures, no memory impairment, no change in sensation  GI: as above  : no dysuria, no hematuria  HEME: no anemia, no easy brusing  MUSCULOSKELETAL: no muscle aches, no back pain, no arthritis  PSYCH: no anxiety or depression  SKIN: no rashes     PE:  Vitals:    03/12/23 2007 03/13/23 0431 03/13/23 0500 03/13/23 1000   BP: 122/72 128/76  130/81   Pulse: 89 90  84   Resp: 18 18  18   Temp: 36.6 °C (97.9 °F)  36.5 °C (97.7 °F) 36.5 °C (97.7 °F)   TempSrc: Temporal  Temporal Oral   SpO2: 98%  97% 98%   Weight:       Height:         Gen: AAOx3, NAD, lying in bed  HEENT: PERRL, EOMI, nares patent, Mucous membranes moist  Neck: supple, no cervical or supraclavicular adenopathy  CVS: regular rhythm, normal rate, no MRG  Pulm: CTAB, no crackles  Abd: soft, Nd, TTP in the epigastrium, no guarding or rebound  Ext: no edema, normal sensation  NEURO: grossly normal, no weakness  Skin: warm, no rash  Psych: odd affect     LABS:  Lab Results   Component Value Date/Time    SODIUM 138 03/13/2023 01:57 AM    POTASSIUM 3.9 03/13/2023 01:57 AM    CHLORIDE 100 03/13/2023 01:57 AM    CO2 25 03/13/2023 01:57 AM    GLUCOSE 218 (H) 03/13/2023 01:57 AM    BUN 21 03/13/2023 01:57 AM    CREATININE 0.72 03/13/2023 01:57 AM    BUNCREATRAT 15.7 11/11/2021 02:47 PM     "  Lab Results   Component Value Date/Time    WBC 10.9 (H) 03/13/2023 01:57 AM    RBC 4.61 (L) 03/13/2023 01:57 AM    HEMOGLOBIN 12.7 (L) 03/13/2023 01:57 AM    HEMATOCRIT 39.4 (L) 03/13/2023 01:57 AM    MCV 85.5 03/13/2023 01:57 AM    MCH 27.5 03/13/2023 01:57 AM    MCHC 32.2 (L) 03/13/2023 01:57 AM    MPV 9.4 03/13/2023 01:57 AM    NEUTSPOLYS 82.30 (H) 03/13/2023 01:57 AM    LYMPHOCYTES 9.90 (L) 03/13/2023 01:57 AM    MONOCYTES 7.00 03/13/2023 01:57 AM    EOSINOPHILS 0.20 03/13/2023 01:57 AM    BASOPHILS 0.10 03/13/2023 01:57 AM        Lab Results   Component Value Date/Time    PROTHROMBTM 12.8 11/08/2021 10:38 PM    INR 0.99 11/08/2021 10:38 PM      Recent Labs     03/11/23  1114   ASTSGOT 14   ALTSGPT 24   TBILIRUBIN 0.5   GLOBULIN 3.1          Problem List Items Addressed This Visit       Metabolic acidosis, increased anion gap     2/2 ketoacidosis  Gap closed  S/p IVFs         Abdominal pain     Thought to be 2/2 DKA but with improvement in BG, his epigastric pain has not improved. Pt reports \"acid\" like feel to his GI tract.  Lipase nl, CT A/P unremarkable  Started PPI, continue  GI cocktail x 1  GI c/s , Dr. Blackwell w/ [facility] consulted for possible EGD silvana  Getting IV morphine and PO oxy prn pain          Other Visit Diagnoses       Leukocytosis, unspecified type        Insulin dependent diabetes mellitus type IA (HCC)        Relevant Medications    insulin regular (HumuLIN R,NovoLIN R) injection    insulin GLARGINE (Lantus,Semglee) injection             ASSESSMENT: 58 yo male with epigastric pain and admission for DKA. He seems to have improved from the sugar standpoint but has not had any improvement in his pain. The nature of his pain is unclear. I suspect a component of esophagitis and/or gastritis. Also, there is a possibility of gastroparesis due to his poor sugar control     PLAN:   1) BID PPI  2) Carafate QID  3) NPO at midnight  4) EGD tomorrow.      Thank you for this consult.     Atilio Freed MD   "

## 2023-03-13 NOTE — PROGRESS NOTES
Received report from day shift nurse. Assumed pt care at 1915. Pt is alert and oriented, resting with eyes closed and comfortably in bed. Pt on room air; no signs of SOB/respiratory distress. Pt complains of abdominal pain at 7/10 pain scale; medicated with prn pain medication as per MAR. Needs attended well. Fall precautions in place. Bed at lowest position. Call light and personal belongings within reach. Continue to monitor. Hourly rounding in progress.    2012 regular Insulin 3 units given with .    2313 Insulin Glargine given with .

## 2023-03-13 NOTE — CARE PLAN
Problem: Pain - Standard  Goal: Alleviation of pain or a reduction in pain to the patient’s comfort goal  Outcome: Progressing     Problem: Fall Risk  Goal: Patient will remain free from falls  Outcome: Progressing     Problem: Diabetes Management  Goal: Patient will achieve and maintain glucose in satisfactory range  Outcome: Progressing     Problem: Skin Integrity - Diabetes  Goal: Patient's skin on legs and feet will remain intact while hospitalized  Outcome: Progressing   The patient is Stable - Low risk of patient condition declining or worsening    Shift Goals  Clinical Goals: Pt pain will be lesser than 3/10, Pt will reduce episodes of nausea  Patient Goals: Pain managment, free from fall  Family Goals: n/a    Progress made toward(s) clinical / shift goals:  Patient pain is at 3/10, No new episode of nausea.Patient is able to rest comfortably. Patient able to ambulate up to bathroom using front wheel walker, standby assist, tolerated well.     Patient is not progressing towards the following goals:Progressing on other goals.

## 2023-03-13 NOTE — CARE PLAN
The patient is Stable - Low risk of patient condition declining or worsening    Shift Goals  Clinical Goals: Pt will report pain less than 5/10 throughout the shift. Pt will have no episode of nausea and vomiting.  Patient Goals: pain control and management    Progress made toward(s) clinical / shift goals:  Pt seen sleeping comfortably in between rounds. Pt required 1 prn pain medication throughout the shift. Pt denies nausea and no episode of vomiting throughout the shift. Pt is on blood glucose monitoring.    Patient is not progressing towards the following goals:n/a

## 2023-03-14 ENCOUNTER — ANESTHESIA EVENT (OUTPATIENT)
Dept: SURGERY | Facility: MEDICAL CENTER | Age: 58
DRG: 638 | End: 2023-03-14
Payer: COMMERCIAL

## 2023-03-14 ENCOUNTER — ANESTHESIA (OUTPATIENT)
Dept: SURGERY | Facility: MEDICAL CENTER | Age: 58
DRG: 638 | End: 2023-03-14
Payer: COMMERCIAL

## 2023-03-14 LAB
ANION GAP SERPL CALC-SCNC: 8 MMOL/L (ref 7–16)
BASOPHILS # BLD AUTO: 0.1 % (ref 0–1.8)
BASOPHILS # BLD: 0.01 K/UL (ref 0–0.12)
BUN SERPL-MCNC: 12 MG/DL (ref 8–22)
CALCIUM SERPL-MCNC: 7.4 MG/DL (ref 8.4–10.2)
CHLORIDE SERPL-SCNC: 103 MMOL/L (ref 96–112)
CO2 SERPL-SCNC: 27 MMOL/L (ref 20–33)
CREAT SERPL-MCNC: 0.52 MG/DL (ref 0.5–1.4)
EOSINOPHIL # BLD AUTO: 0.11 K/UL (ref 0–0.51)
EOSINOPHIL NFR BLD: 1.4 % (ref 0–6.9)
ERYTHROCYTE [DISTWIDTH] IN BLOOD BY AUTOMATED COUNT: 39.2 FL (ref 35.9–50)
GFR SERPLBLD CREATININE-BSD FMLA CKD-EPI: 116 ML/MIN/1.73 M 2
GLUCOSE BLD STRIP.AUTO-MCNC: 101 MG/DL (ref 65–99)
GLUCOSE BLD STRIP.AUTO-MCNC: 183 MG/DL (ref 65–99)
GLUCOSE BLD STRIP.AUTO-MCNC: 88 MG/DL (ref 65–99)
GLUCOSE BLD STRIP.AUTO-MCNC: 96 MG/DL (ref 65–99)
GLUCOSE SERPL-MCNC: 152 MG/DL (ref 65–99)
HCT VFR BLD AUTO: 34.8 % (ref 42–52)
HGB BLD-MCNC: 11.3 G/DL (ref 14–18)
IMM GRANULOCYTES # BLD AUTO: 0.04 K/UL (ref 0–0.11)
IMM GRANULOCYTES NFR BLD AUTO: 0.5 % (ref 0–0.9)
LYMPHOCYTES # BLD AUTO: 1.63 K/UL (ref 1–4.8)
LYMPHOCYTES NFR BLD: 20.7 % (ref 22–41)
MAGNESIUM SERPL-MCNC: 1.8 MG/DL (ref 1.5–2.5)
MCH RBC QN AUTO: 27.8 PG (ref 27–33)
MCHC RBC AUTO-ENTMCNC: 32.5 G/DL (ref 33.7–35.3)
MCV RBC AUTO: 85.5 FL (ref 81.4–97.8)
MONOCYTES # BLD AUTO: 0.69 K/UL (ref 0–0.85)
MONOCYTES NFR BLD AUTO: 8.7 % (ref 0–13.4)
NEUTROPHILS # BLD AUTO: 5.41 K/UL (ref 1.82–7.42)
NEUTROPHILS NFR BLD: 68.6 % (ref 44–72)
NRBC # BLD AUTO: 0 K/UL
NRBC BLD-RTO: 0 /100 WBC
PLATELET # BLD AUTO: 175 K/UL (ref 164–446)
PMV BLD AUTO: 9.6 FL (ref 9–12.9)
POTASSIUM SERPL-SCNC: 3.5 MMOL/L (ref 3.6–5.5)
RBC # BLD AUTO: 4.07 M/UL (ref 4.7–6.1)
SODIUM SERPL-SCNC: 138 MMOL/L (ref 135–145)
WBC # BLD AUTO: 7.9 K/UL (ref 4.8–10.8)

## 2023-03-14 PROCEDURE — A9270 NON-COVERED ITEM OR SERVICE: HCPCS | Performed by: NURSE PRACTITIONER

## 2023-03-14 PROCEDURE — 700102 HCHG RX REV CODE 250 W/ 637 OVERRIDE(OP): Performed by: INTERNAL MEDICINE

## 2023-03-14 PROCEDURE — 36415 COLL VENOUS BLD VENIPUNCTURE: CPT

## 2023-03-14 PROCEDURE — 83735 ASSAY OF MAGNESIUM: CPT

## 2023-03-14 PROCEDURE — 94760 N-INVAS EAR/PLS OXIMETRY 1: CPT

## 2023-03-14 PROCEDURE — 160002 HCHG RECOVERY MINUTES (STAT): Performed by: INTERNAL MEDICINE

## 2023-03-14 PROCEDURE — 700101 HCHG RX REV CODE 250: Performed by: ANESTHESIOLOGY

## 2023-03-14 PROCEDURE — 160035 HCHG PACU - 1ST 60 MINS PHASE I: Performed by: INTERNAL MEDICINE

## 2023-03-14 PROCEDURE — 160202 HCHG ENDO MINUTES - 1ST 30 MINS LEVEL 3: Performed by: INTERNAL MEDICINE

## 2023-03-14 PROCEDURE — 700102 HCHG RX REV CODE 250 W/ 637 OVERRIDE(OP): Performed by: NURSE PRACTITIONER

## 2023-03-14 PROCEDURE — A9270 NON-COVERED ITEM OR SERVICE: HCPCS | Performed by: INTERNAL MEDICINE

## 2023-03-14 PROCEDURE — 700105 HCHG RX REV CODE 258: Performed by: INTERNAL MEDICINE

## 2023-03-14 PROCEDURE — 0DJ08ZZ INSPECTION OF UPPER INTESTINAL TRACT, VIA NATURAL OR ARTIFICIAL OPENING ENDOSCOPIC: ICD-10-PCS | Performed by: INTERNAL MEDICINE

## 2023-03-14 PROCEDURE — 00731 ANES UPR GI NDSC PX NOS: CPT | Performed by: ANESTHESIOLOGY

## 2023-03-14 PROCEDURE — 82962 GLUCOSE BLOOD TEST: CPT

## 2023-03-14 PROCEDURE — 160009 HCHG ANES TIME/MIN: Performed by: INTERNAL MEDICINE

## 2023-03-14 PROCEDURE — 160048 HCHG OR STATISTICAL LEVEL 1-5: Performed by: INTERNAL MEDICINE

## 2023-03-14 PROCEDURE — 85025 COMPLETE CBC W/AUTO DIFF WBC: CPT

## 2023-03-14 PROCEDURE — 700111 HCHG RX REV CODE 636 W/ 250 OVERRIDE (IP): Performed by: NURSE PRACTITIONER

## 2023-03-14 PROCEDURE — 700111 HCHG RX REV CODE 636 W/ 250 OVERRIDE (IP): Performed by: ANESTHESIOLOGY

## 2023-03-14 PROCEDURE — 770001 HCHG ROOM/CARE - MED/SURG/GYN PRIV*

## 2023-03-14 PROCEDURE — 99232 SBSQ HOSP IP/OBS MODERATE 35: CPT | Performed by: INTERNAL MEDICINE

## 2023-03-14 PROCEDURE — 80048 BASIC METABOLIC PNL TOTAL CA: CPT

## 2023-03-14 RX ORDER — SODIUM CHLORIDE, SODIUM LACTATE, POTASSIUM CHLORIDE, CALCIUM CHLORIDE 600; 310; 30; 20 MG/100ML; MG/100ML; MG/100ML; MG/100ML
INJECTION, SOLUTION INTRAVENOUS CONTINUOUS
Status: DISCONTINUED | OUTPATIENT
Start: 2023-03-14 | End: 2023-03-14 | Stop reason: HOSPADM

## 2023-03-14 RX ORDER — SODIUM CHLORIDE, SODIUM LACTATE, POTASSIUM CHLORIDE, CALCIUM CHLORIDE 600; 310; 30; 20 MG/100ML; MG/100ML; MG/100ML; MG/100ML
INJECTION, SOLUTION INTRAVENOUS CONTINUOUS
Status: ACTIVE | OUTPATIENT
Start: 2023-03-14 | End: 2023-03-14

## 2023-03-14 RX ORDER — ONDANSETRON 2 MG/ML
4 INJECTION INTRAMUSCULAR; INTRAVENOUS
Status: DISCONTINUED | OUTPATIENT
Start: 2023-03-14 | End: 2023-03-14 | Stop reason: HOSPADM

## 2023-03-14 RX ORDER — MEPERIDINE HYDROCHLORIDE 25 MG/ML
12.5 INJECTION INTRAMUSCULAR; INTRAVENOUS; SUBCUTANEOUS
Status: DISCONTINUED | OUTPATIENT
Start: 2023-03-14 | End: 2023-03-14 | Stop reason: HOSPADM

## 2023-03-14 RX ORDER — HYDRALAZINE HYDROCHLORIDE 20 MG/ML
5 INJECTION INTRAMUSCULAR; INTRAVENOUS
Status: DISCONTINUED | OUTPATIENT
Start: 2023-03-14 | End: 2023-03-14 | Stop reason: HOSPADM

## 2023-03-14 RX ORDER — OXYCODONE HCL 5 MG/5 ML
5 SOLUTION, ORAL ORAL
Status: DISCONTINUED | OUTPATIENT
Start: 2023-03-14 | End: 2023-03-14 | Stop reason: HOSPADM

## 2023-03-14 RX ORDER — LIDOCAINE HYDROCHLORIDE 20 MG/ML
INJECTION, SOLUTION EPIDURAL; INFILTRATION; INTRACAUDAL; PERINEURAL PRN
Status: DISCONTINUED | OUTPATIENT
Start: 2023-03-14 | End: 2023-03-14 | Stop reason: SURG

## 2023-03-14 RX ORDER — DIPHENHYDRAMINE HYDROCHLORIDE 50 MG/ML
12.5 INJECTION INTRAMUSCULAR; INTRAVENOUS
Status: DISCONTINUED | OUTPATIENT
Start: 2023-03-14 | End: 2023-03-14 | Stop reason: HOSPADM

## 2023-03-14 RX ORDER — OXYCODONE HCL 5 MG/5 ML
10 SOLUTION, ORAL ORAL
Status: DISCONTINUED | OUTPATIENT
Start: 2023-03-14 | End: 2023-03-14 | Stop reason: HOSPADM

## 2023-03-14 RX ORDER — HYDROMORPHONE HYDROCHLORIDE 1 MG/ML
0.2 INJECTION, SOLUTION INTRAMUSCULAR; INTRAVENOUS; SUBCUTANEOUS
Status: DISCONTINUED | OUTPATIENT
Start: 2023-03-14 | End: 2023-03-14 | Stop reason: HOSPADM

## 2023-03-14 RX ORDER — MIDAZOLAM HYDROCHLORIDE 1 MG/ML
1 INJECTION INTRAMUSCULAR; INTRAVENOUS
Status: DISCONTINUED | OUTPATIENT
Start: 2023-03-14 | End: 2023-03-14 | Stop reason: HOSPADM

## 2023-03-14 RX ORDER — HYDROMORPHONE HYDROCHLORIDE 1 MG/ML
0.4 INJECTION, SOLUTION INTRAMUSCULAR; INTRAVENOUS; SUBCUTANEOUS
Status: DISCONTINUED | OUTPATIENT
Start: 2023-03-14 | End: 2023-03-14 | Stop reason: HOSPADM

## 2023-03-14 RX ORDER — HALOPERIDOL 5 MG/ML
1 INJECTION INTRAMUSCULAR
Status: DISCONTINUED | OUTPATIENT
Start: 2023-03-14 | End: 2023-03-14 | Stop reason: HOSPADM

## 2023-03-14 RX ORDER — HYDROMORPHONE HYDROCHLORIDE 1 MG/ML
0.1 INJECTION, SOLUTION INTRAMUSCULAR; INTRAVENOUS; SUBCUTANEOUS
Status: DISCONTINUED | OUTPATIENT
Start: 2023-03-14 | End: 2023-03-14 | Stop reason: HOSPADM

## 2023-03-14 RX ORDER — EPHEDRINE SULFATE 50 MG/ML
5 INJECTION, SOLUTION INTRAVENOUS
Status: DISCONTINUED | OUTPATIENT
Start: 2023-03-14 | End: 2023-03-14 | Stop reason: HOSPADM

## 2023-03-14 RX ADMIN — OXYCODONE 5 MG: 5 TABLET ORAL at 07:47

## 2023-03-14 RX ADMIN — SUCRALFATE 1 G: 1 SUSPENSION ORAL at 23:30

## 2023-03-14 RX ADMIN — LIDOCAINE HYDROCHLORIDE 80 MG: 20 INJECTION, SOLUTION EPIDURAL; INFILTRATION; INTRACAUDAL at 11:40

## 2023-03-14 RX ADMIN — SODIUM CHLORIDE: 9 INJECTION, SOLUTION INTRAVENOUS at 05:45

## 2023-03-14 RX ADMIN — FENTANYL CITRATE 50 MCG: 50 INJECTION, SOLUTION INTRAMUSCULAR; INTRAVENOUS at 11:36

## 2023-03-14 RX ADMIN — ENOXAPARIN SODIUM 40 MG: 40 INJECTION SUBCUTANEOUS at 20:11

## 2023-03-14 RX ADMIN — PROPOFOL 50 MG: 10 INJECTION, EMULSION INTRAVENOUS at 11:42

## 2023-03-14 RX ADMIN — INSULIN HUMAN 2 UNITS: 100 INJECTION, SOLUTION PARENTERAL at 20:16

## 2023-03-14 RX ADMIN — OXYCODONE 5 MG: 5 TABLET ORAL at 20:13

## 2023-03-14 RX ADMIN — OXYCODONE 5 MG: 5 TABLET ORAL at 14:45

## 2023-03-14 RX ADMIN — OMEPRAZOLE 40 MG: 20 CAPSULE, DELAYED RELEASE ORAL at 20:11

## 2023-03-14 RX ADMIN — SUCRALFATE 1 G: 1 SUSPENSION ORAL at 05:43

## 2023-03-14 RX ADMIN — OMEPRAZOLE 40 MG: 20 CAPSULE, DELAYED RELEASE ORAL at 05:43

## 2023-03-14 RX ADMIN — SUCRALFATE 1 G: 1 SUSPENSION ORAL at 20:11

## 2023-03-14 RX ADMIN — INSULIN HUMAN 2 UNITS: 100 INJECTION, SOLUTION PARENTERAL at 23:31

## 2023-03-14 RX ADMIN — SUCRALFATE 1 G: 1 SUSPENSION ORAL at 13:00

## 2023-03-14 RX ADMIN — PROPOFOL 50 MG: 10 INJECTION, EMULSION INTRAVENOUS at 11:40

## 2023-03-14 RX ADMIN — SODIUM CHLORIDE, POTASSIUM CHLORIDE, SODIUM LACTATE AND CALCIUM CHLORIDE: 600; 310; 30; 20 INJECTION, SOLUTION INTRAVENOUS at 11:36

## 2023-03-14 RX ADMIN — MORPHINE SULFATE 2 MG: 4 INJECTION INTRAVENOUS at 15:46

## 2023-03-14 ASSESSMENT — COGNITIVE AND FUNCTIONAL STATUS - GENERAL
SUGGESTED CMS G CODE MODIFIER DAILY ACTIVITY: CH
DAILY ACTIVITIY SCORE: 24
SUGGESTED CMS G CODE MODIFIER MOBILITY: CH
MOBILITY SCORE: 24

## 2023-03-14 ASSESSMENT — PAIN DESCRIPTION - PAIN TYPE
TYPE: ACUTE PAIN

## 2023-03-14 ASSESSMENT — ENCOUNTER SYMPTOMS: ABDOMINAL PAIN: 1

## 2023-03-14 NOTE — ANESTHESIA TIME REPORT
Anesthesia Start and Stop Event Times     Date Time Event    3/14/2023 1134 Ready for Procedure     1136 Anesthesia Start     1151 Anesthesia Stop        Responsible Staff  03/14/23    Name Role Begin End    Myke Miles M.D. Anesth 1136 1151        Overtime Reason:  no overtime (within assigned shift)    Comments:

## 2023-03-14 NOTE — ANESTHESIA POSTPROCEDURE EVALUATION
Patient: Christoph Taylor    Procedure Summary     Date: 03/14/23 Room / Location:  ENDOSCOPIC ULTRASOUND ROOM / SURGERY [facility]    Anesthesia Start: 1136 Anesthesia Stop: 1151    Procedure: GASTROSCOPY Diagnosis:     Surgeons: Atilio Freed M.D. Responsible Provider: Myke Miles M.D.    Anesthesia Type: MAC ASA Status: 3          Final Anesthesia Type: MAC  Last vitals  BP   Blood Pressure: 105/65    Temp   37.6 °C (99.6 °F)    Pulse   74   Resp   16    SpO2   91 %      Anesthesia Post Evaluation    Patient location during evaluation: PACU  Patient participation: complete - patient participated  Level of consciousness: awake and alert    Airway patency: patent  Anesthetic complications: no  Cardiovascular status: hemodynamically stable  Respiratory status: acceptable  Hydration status: euvolemic    PONV: none          There were no known notable events for this encounter.     Nurse Pain Score: 3 (NPRS)

## 2023-03-14 NOTE — PROGRESS NOTES
1243-Received report from PACU RN.   1300- Pt arrived to floor via gurney then ambulated to hospital bed..  Assumed care of pt.   Pt is AAOX4, no signs of distress, denies nausea/vomiting.   Diet resume and instructed.  Educated for IS.  Fall precautions in place, treaded socks on pt, bed in low position.   Call light within reach.   Educated pt to call if needing anything.   Hourly rounding.

## 2023-03-14 NOTE — OR NURSING
1147: To PACU from OR via gurney, sleeping, respirations spontaneous and non-labored. Pt stable on 6L O2 via mask    1200: Pt awake, verbally responsive. Denies pain and nausea. Stable on 6L mask.     1215: Pt resting comfortably. Tolerating po fluids. Denies pain and nausea.    1230: Denies pain and nausea. Tolerating juice and crackers. Stable on RA. Meets criteria to transfer to floor.      1240: Message left for leslie Olmos via phone.

## 2023-03-14 NOTE — PROGRESS NOTES
Received bedside report.  Assumed pt care.   Assessment and chart check complete.  Pt is AA0X4, no signs of distress, denies nausea/vomiting.  Updated for the POC of the day.  Pt pain currently 7/10, medicated per MAR.   NPO with sips of medications instructed.  IVF infusing.  0091-Report given to pre op Homer CASPER.  BS-96 mg/dl  Fall precautions in place, treaded socks on pt, bed in low position.   Call light within reach.   Educated pt to call if needing anything.   Hourly rounding.   1020-Pt is sending for surgery.

## 2023-03-14 NOTE — ANESTHESIA PREPROCEDURE EVALUATION
Case: [id number] Date/Time: 03/14/23 1030    Procedure: GASTROSCOPY    Location:  ENDOSCOPIC ULTRASOUND ROOM / SURGERY [facility]    Surgeons: Atilio Freed M.D.      56 yo male with multiple medical problems  Gi bleed    P Med Hx:  Diabetes - type 2   Hypercholesteremia  [place] (hard of hearing)  Abnormal electrocardiogram (ECG)   CKD (chronic kidney disease) stage 3, GFR 30-59 ml/min ([facility])    P Surg Hx:  Appy  Lumbar fusion  No reported problems with previous anesthesia    Former smoker    NPO    Relevant Problems   ENDO   (positive) Diabetes mellitus type 2, insulin dependent ([facility])       Physical Exam    Airway   Mallampati: II  TM distance: >3 FB  Neck ROM: full       Cardiovascular - normal exam  Rhythm: regular  Rate: normal  (-) murmur     Dental - normal exam           Pulmonary - normal exam  Breath sounds clear to auscultation     Abdominal    Neurological - normal exam                 Anesthesia Plan    ASA 3   ASA physical status 3 criteria: diabetes - poorly controlled    Plan - MAC               Induction: intravenous      Pertinent diagnostic labs and testing reviewed    Informed Consent:    Anesthetic plan and risks discussed with patient.    Use of blood products discussed with: patient whom consented to blood products.

## 2023-03-14 NOTE — OP REPORT
DATE OF SERVICE:  03/14/2023     INDICATION FOR PROCEDURE:  epigastric pain    PROCEDURE PERFORMED: EGD     CONSENT:  Informed consent was obtained directly from the patient after   benefits, risks and possible alternatives were discussed.     MEDICATIONS:  The patient received Propofol for this procedure. Please see the anesthesia record for details.       PROCEDURE DESCRIPTION:  The patient was placed in the left lateral decubitus position and provided with supplemental oxygen via nasal cannula.  Vital signs were monitored continuously throughout the procedure.  When ready, the upper endoscope was placed in the patient's mouth and advanced easily and carefully to the esophagus and subsequently to the stomach and duodenum.The scope was slowly withdrawn and mucosa carefully examined. Retroflexion was performed in the stomach. The patients toleration of this procedure was excellent     FINDINGS:    Esophagus: severe LA class D esophagitis extending from the GEJ to the mid-esophagus    Stomach: normal    Duodenum: normal         COMPLICATIONS:  No complications or blood loss during or in the immediate postoperative period.     IMPRESSION:  1.  severe esophagitis     RECOMMENDATION:    1. BID PPI for 2 months then once a day  2. Repeat EGD in 3 months to ensure esophagitis has healed and to assess for Barretts  3. Diabetic diet  4. Carafate to help with mucosal healing    Please call [facility] if we can be of further assistance

## 2023-03-14 NOTE — DIETARY
Nutrition Services: DM Diet Education Consult   Day 3 of admit.  Christoph Taylor is a 57 y.o. male with admitting DX of Abdominal pain [R10.9]    RD able to visit pt at bedside to offer DM diet education. Pt refused education because he is in pain. He accepted H/Os of DM2 Booklet and CHO counting.     No other education needs identified at this time. Consider referral to outpatient nutrition services for continuation of education as indicated or per pt preferences.     RD will follow up with DM diet ed as able.

## 2023-03-14 NOTE — PROGRESS NOTES
Received report from PENNIE Vargas. Assumed pt care at 2115. Pt is A&O x 4 , resting comfortably in bed. Pt on room air. No signs of SOB/respiratory distress. Fall precautions in place. Bed at lowest position. Call light and personal belongings within reach.

## 2023-03-14 NOTE — CARE PLAN
The patient is Stable - Low risk of patient condition declining or worsening    Shift Goals  Clinical Goals: for EGD and decrease pain throughout the shift  Patient Goals: adequate pain control  Family Goals: n/a    Progress made toward(s) clinical / shift goals:  PRN medication given for pain controlled. Tolerating diabetic diet after EGD, denies any nausea/vomiting.    Patient is not progressing towards the following goals:      Problem: Pain - Standard  Goal: Alleviation of pain or a reduction in pain to the patient’s comfort goal  Outcome: Progressing     Problem: Fall Risk  Goal: Patient will remain free from falls  Outcome: Progressing     Problem: Diabetes Management  Goal: Patient will achieve and maintain glucose in satisfactory range  Outcome: Progressing

## 2023-03-14 NOTE — PROGRESS NOTES
Received report from dayshift RN. Pt resting in bed, family at bedside. Pt A&O x4, on RA. Pt denies pain at this time.  Pt updated with POC which includes NPO at midnight, rest and blood glucose checks. Call light within reach, fall precautions in place, all needs met at this time.

## 2023-03-14 NOTE — CARE PLAN
The patient is Stable - Low risk of patient condition declining or worsening    Shift Goals  Clinical Goals: Patient's pain will be less than 3/10.  Patient Goals: rest and sleep  Family Goals: n/a    Progress made toward(s) clinical / shift goals:  Patient's pain was managed with PRN pain medication. Patient advised on NPO post midnight. Call light and belongings within reach.    Patient is not progressing towards the following goals:

## 2023-03-14 NOTE — PROGRESS NOTES
Hospital Medicine Daily Progress Note    Date of Service  3/14/2023    Chief Complaint  Christoph Taylor is a 57 y.o. male admitted 3/11/2023 with abd pain , n/v    Hospital Course  58 yo w/ hx of DM2 with poor adherence to insulin, prior admissions for DKA, CKD 3, HLD, pw abd pain n/v found to have elev BG in 500s, AG acidosis, b hydroxybutyric acid 4.5, c/f DKA. Given IVFs, insulin with improvement of BG to mid 200s, admitted inpatient for further care.    Interval Problem Update  Patient still having some abdominal pain  Patient underwent EGD on 3/14 which showed severe LA class D esophagitis  Only twice daily PPI for 2 months then once a day, Carafate and repeat EGD in 3 months  Okay to progress diet.  If improving can likely DC in the next 24 to 48 hours    I have discussed this patient's plan of care and discharge plan at IDT rounds today with Case Management, Nursing, Nursing leadership, and other members of the IDT team.    Code Status  Full Code    Disposition  Patient is not medically cleared for discharge.   Anticipate discharge to to home with close outpatient follow-up.  I have placed the appropriate orders for post-discharge needs.    Review of Systems  Review of Systems   Gastrointestinal:  Positive for abdominal pain.   All other systems reviewed and are negative.     Physical Exam  Temp:  [36.4 °C (97.5 °F)-37.6 °C (99.6 °F)] 36.8 °C (98.3 °F)  Pulse:  [61-87] 75  Resp:  [14-18] 16  BP: ()/(42-86) 115/61  SpO2:  [89 %-100 %] 89 %    Physical Exam  Vitals and nursing note reviewed.   Constitutional:       General: He is not in acute distress.     Appearance: He is not ill-appearing.      Comments: Drowsy but arousable  During interview able to sit up and partake in interview   Cardiovascular:      Rate and Rhythm: Normal rate and regular rhythm.      Heart sounds: No murmur heard.  Pulmonary:      Effort: Pulmonary effort is normal.      Breath sounds: Normal breath sounds.   Abdominal:       General: There is no distension.      Palpations: Abdomen is soft.      Tenderness: There is abdominal tenderness. There is no guarding.   Musculoskeletal:         General: Normal range of motion.      Cervical back: Normal range of motion.      Right lower leg: No edema.      Left lower leg: No edema.   Skin:     General: Skin is warm and dry.   Neurological:      General: No focal deficit present.      Mental Status: Mental status is at baseline.      Comments: drowsy   Psychiatric:      Comments: Depressed affect       Fluids    Intake/Output Summary (Last 24 hours) at 3/14/2023 1454  Last data filed at 3/14/2023 1335  Gross per 24 hour   Intake 1020 ml   Output --   Net 1020 ml       Laboratory  Recent Labs     03/12/23  0146 03/13/23  0157 03/14/23  0238   WBC 13.1* 10.9* 7.9   RBC 4.99 4.61* 4.07*   HEMOGLOBIN 13.8* 12.7* 11.3*   HEMATOCRIT 41.7* 39.4* 34.8*   MCV 83.6 85.5 85.5   MCH 27.7 27.5 27.8   MCHC 33.1* 32.2* 32.5*   RDW 40.0 40.6 39.2   PLATELETCT 261 215 175   MPV 9.3 9.4 9.6     Recent Labs     03/12/23  0146 03/13/23  0157 03/14/23  0238   SODIUM 139 138 138   POTASSIUM 3.9 3.9 3.5*   CHLORIDE 97 100 103   CO2 28 25 27   GLUCOSE 271* 218* 152*   BUN 35* 21 12   CREATININE 0.84 0.72 0.52   CALCIUM 8.5 7.8* 7.4*                   Imaging  CT-ABDOMEN-PELVIS WITH   Final Result      1.  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      2.  Diverticulosis without evidence of diverticulitis.           Assessment/Plan  * Diabetic ketoacidosis without coma associated with type 2 diabetes mellitus (HCC)  Assessment & Plan  Patient pw abd pain, n/v and dehydration  DKA vs HHS  BG level 595, AG of 19, B hydroxy 4.5, UA w/ small ketones  S/p IVFs and SSI  Back in Oct had very similar presentation, A1c 12.7%, issues w/ adherence to medications  ADAT - tolerating PO  Increase lantus to 15 units BID, would dc on this  Diabetic educator cs    Abdominal pain- (present on admission)  Assessment &  "Plan  Thought to be 2/2 DKA but with improvement in BG, his epigastric pain has not improved. Pt reports \"acid\" like feel to his GI tract.  Lipase nl, CT A/P unremarkable  Started PPI, continue  GI cocktail x 1  GI c/s , Dr. Blackwell w/ [facility] consulted, follow recommendations  EGD on 3/14  Getting IV morphine and PO oxy prn pain-still having some abdominal pain and requiring IV pain medications-monitor for side effects such as constipation, altered mental status.    Diabetes mellitus type 2, insulin dependent (HCC)  Assessment & Plan  -Very poorly controlled, pw DKA  - A1c in the past up to 16, in Oct 12%  -On NovoLog 70/30 outpatient. Getting lantus 15 units BID here, would continue this as outpatient  -diabetic diet.  Monitor with Accu-Cheks and a.m. labs    Noncompliance with medications- (present on admission)  Assessment & Plan  Diabetes education    Metabolic acidosis, increased anion gap  Assessment & Plan  2/2 ketoacidosis  Gap closed  Continue to monitor very closely as patient will resume eating full diet today         VTE prophylaxis: enoxaparin ppx    I have performed a physical exam and reviewed and updated ROS and Plan today (3/14/2023). In review of yesterday's note (3/13/2023), there are no changes except as documented above.    "

## 2023-03-15 LAB
ANION GAP SERPL CALC-SCNC: 5 MMOL/L (ref 7–16)
BUN SERPL-MCNC: 9 MG/DL (ref 8–22)
CALCIUM SERPL-MCNC: 7.3 MG/DL (ref 8.4–10.2)
CHLORIDE SERPL-SCNC: 100 MMOL/L (ref 96–112)
CO2 SERPL-SCNC: 27 MMOL/L (ref 20–33)
CREAT SERPL-MCNC: 0.5 MG/DL (ref 0.5–1.4)
GFR SERPLBLD CREATININE-BSD FMLA CKD-EPI: 118 ML/MIN/1.73 M 2
GLUCOSE BLD STRIP.AUTO-MCNC: 106 MG/DL (ref 65–99)
GLUCOSE BLD STRIP.AUTO-MCNC: 114 MG/DL (ref 65–99)
GLUCOSE BLD STRIP.AUTO-MCNC: 122 MG/DL (ref 65–99)
GLUCOSE BLD STRIP.AUTO-MCNC: 153 MG/DL (ref 65–99)
GLUCOSE BLD STRIP.AUTO-MCNC: 86 MG/DL (ref 65–99)
GLUCOSE SERPL-MCNC: 160 MG/DL (ref 65–99)
MAGNESIUM SERPL-MCNC: 1.7 MG/DL (ref 1.5–2.5)
POTASSIUM SERPL-SCNC: 3.2 MMOL/L (ref 3.6–5.5)
SODIUM SERPL-SCNC: 132 MMOL/L (ref 135–145)

## 2023-03-15 PROCEDURE — 700105 HCHG RX REV CODE 258: Performed by: INTERNAL MEDICINE

## 2023-03-15 PROCEDURE — 700102 HCHG RX REV CODE 250 W/ 637 OVERRIDE(OP): Performed by: NURSE PRACTITIONER

## 2023-03-15 PROCEDURE — A9270 NON-COVERED ITEM OR SERVICE: HCPCS | Performed by: NURSE PRACTITIONER

## 2023-03-15 PROCEDURE — 99231 SBSQ HOSP IP/OBS SF/LOW 25: CPT | Performed by: INTERNAL MEDICINE

## 2023-03-15 PROCEDURE — 700111 HCHG RX REV CODE 636 W/ 250 OVERRIDE (IP): Performed by: NURSE PRACTITIONER

## 2023-03-15 PROCEDURE — 94760 N-INVAS EAR/PLS OXIMETRY 1: CPT

## 2023-03-15 PROCEDURE — 82962 GLUCOSE BLOOD TEST: CPT | Mod: 91

## 2023-03-15 PROCEDURE — A9270 NON-COVERED ITEM OR SERVICE: HCPCS | Performed by: INTERNAL MEDICINE

## 2023-03-15 PROCEDURE — 770001 HCHG ROOM/CARE - MED/SURG/GYN PRIV*

## 2023-03-15 PROCEDURE — 700102 HCHG RX REV CODE 250 W/ 637 OVERRIDE(OP): Performed by: INTERNAL MEDICINE

## 2023-03-15 PROCEDURE — 36415 COLL VENOUS BLD VENIPUNCTURE: CPT

## 2023-03-15 PROCEDURE — 83735 ASSAY OF MAGNESIUM: CPT

## 2023-03-15 PROCEDURE — 80048 BASIC METABOLIC PNL TOTAL CA: CPT

## 2023-03-15 RX ORDER — OMEPRAZOLE 40 MG/1
40 CAPSULE, DELAYED RELEASE ORAL 2 TIMES DAILY
Qty: 60 CAPSULE | Refills: 0 | Status: SHIPPED | OUTPATIENT
Start: 2023-03-15 | End: 2023-04-05

## 2023-03-15 RX ORDER — SUCRALFATE ORAL 1 G/10ML
1 SUSPENSION ORAL EVERY 6 HOURS
Qty: 560 ML | Refills: 0 | Status: SHIPPED | OUTPATIENT
Start: 2023-03-15 | End: 2023-03-29

## 2023-03-15 RX ORDER — CHOLECALCIFEROL (VITAMIN D3) 125 MCG
5 CAPSULE ORAL NIGHTLY
Status: DISCONTINUED | OUTPATIENT
Start: 2023-03-15 | End: 2023-03-16 | Stop reason: HOSPADM

## 2023-03-15 RX ADMIN — OMEPRAZOLE 40 MG: 20 CAPSULE, DELAYED RELEASE ORAL at 17:37

## 2023-03-15 RX ADMIN — SUCRALFATE 1 G: 1 SUSPENSION ORAL at 05:21

## 2023-03-15 RX ADMIN — SUCRALFATE 1 G: 1 SUSPENSION ORAL at 23:56

## 2023-03-15 RX ADMIN — SODIUM CHLORIDE: 9 INJECTION, SOLUTION INTRAVENOUS at 21:33

## 2023-03-15 RX ADMIN — SUCRALFATE 1 G: 1 SUSPENSION ORAL at 12:18

## 2023-03-15 RX ADMIN — SUCRALFATE 1 G: 1 SUSPENSION ORAL at 17:37

## 2023-03-15 RX ADMIN — OMEPRAZOLE 40 MG: 20 CAPSULE, DELAYED RELEASE ORAL at 05:21

## 2023-03-15 RX ADMIN — SODIUM CHLORIDE: 9 INJECTION, SOLUTION INTRAVENOUS at 00:07

## 2023-03-15 RX ADMIN — OXYCODONE 5 MG: 5 TABLET ORAL at 12:25

## 2023-03-15 RX ADMIN — ENOXAPARIN SODIUM 40 MG: 40 INJECTION SUBCUTANEOUS at 17:36

## 2023-03-15 RX ADMIN — OXYCODONE 5 MG: 5 TABLET ORAL at 17:04

## 2023-03-15 RX ADMIN — Medication 5 MG: at 21:26

## 2023-03-15 RX ADMIN — OXYCODONE 5 MG: 5 TABLET ORAL at 05:21

## 2023-03-15 ASSESSMENT — PAIN DESCRIPTION - PAIN TYPE
TYPE: ACUTE PAIN

## 2023-03-15 NOTE — PROGRESS NOTES
Report received from NOC RN at bedside, assumed care of pt.   POC and medications reviewed with pt. Pt verbalized understanding.   Safety measures in place.  Hourly rounding in place.

## 2023-03-15 NOTE — DISCHARGE SUMMARY
"Discharge Summary    CHIEF COMPLAINT ON ADMISSION  Chief Complaint   Patient presents with    Abdominal Pain     7/10 pain stays in lower abdomen       Reason for Admission  EMS     Admission Date  3/11/2023    CODE STATUS  Full Code    HPI & HOSPITAL COURSE  Per notes, \"57 y.o. male with past medical history of CKD stage III, diabetes mellitus type 2, hard of hearing, and hypercholesterolemia who presented to the ED with chief complaint of abdominal pain.     Reports onset of left upper quadrant discomfort yesterday.  Described as constant, non-radiating, sudden onset.  No aggravating or alleviating factors.  Associated nausea, vomiting, and diarrhea.  1 episode of loose stool.  Denies hematemesis, hematochezia, melena, flank pain, dysuria, urinary frequency/urgency, fevers, and chest pain.  Denies known sick contacts.  No recent antibiotic use.  Denies recent travel or exposure to untreated water.  History of appendectomy.  states he has not been checking his blood sugar but he has been using his NovoLog 70/30 as prescribed.       In ED, BC remarkable for WBC 17.6, MCV 79.9, poly neutrophils 86.9, absolute neutrophils 15.3, lymphocytes 6.4, and monocyte 1.05.  CMP remarkable for chloride 91, anion gap 19, glucose 358, BUN 39, alk phos 104.  Lactic acid negative.  Procalcitonin negative.  VBG showed pH 7.49, CO2 39, PO2 55, HCO3 29.  No evidence of DKA.  CT A/P showed horseshoe kidney with nonobstructive left renal stones and diverticulosis without evidence of diverticulitis.  Given 3 L normal saline, 4 mg Zofran, and 4 mg IV morphine.\"    Patient was initially admitted and treated for elevated blood sugars and intractable nausea and vomiting.  Additionally while he was in the hospital he did develop some bleeding.  He is evaluated gastroenterology and underwent an EGD on 3/14 which showed severe LA class D esophagitis.  Gastroenterology recommended a repeat EGD in 3 months, PPI twice daily for 2 months then once a " day, Carafate and follow-up in the outpatient setting.  Patient progressed well, was tolerating diet and able to discharge safely and be managed in the outpatient setting.  He will need to follow-up closely with gastroenterology as well as PCP within 1 to 2 weeks.    Therefore, he is discharged in good and stable condition to home with close outpatient follow-up.    The patient met 2-midnight criteria for an inpatient stay at the time of discharge.    Discharge Date  3/15/2023    FOLLOW UP ITEMS POST DISCHARGE  FU with PCP  FU with GI    DISCHARGE DIAGNOSES  Principal Problem:    Diabetic ketoacidosis without coma associated with type 2 diabetes mellitus (HCC) POA: Unknown  Active Problems:    Metabolic acidosis, increased anion gap POA: Unknown    Noncompliance with medications POA: Yes    Diabetes mellitus type 2, insulin dependent (HCC) POA: Unknown    Abdominal pain POA: Yes  Resolved Problems:    Hyperosmolar hyperglycemic state (HHS) (HCC) POA: Unknown    SIRS (systemic inflammatory response syndrome) (HCC) POA: Unknown      FOLLOW UP  No future appointments.  Orin Darnell, A.P.R.N.  [address] [address] [id number] [phone]    Schedule an appointment as soon as possible for a visit in 1 week(s)      Atilio Freed M.D.  [address] [facility]   [facility] [id number] [phone]    Schedule an appointment as soon as possible for a visit in 1 week(s)        MEDICATIONS ON DISCHARGE     Medication List        START taking these medications        Instructions   omeprazole 40 MG delayed-release capsule  Commonly known as: PRILOSEC   Take 1 Capsule by mouth 2 times a day for 30 days.  Dose: 40 mg     sucralfate 1 GM/10ML Susp  Commonly known as: CARAFATE   Take 10 mL by mouth every 6 hours for 14 days.  Dose: 1 g            CHANGE how you take these medications        Instructions   HumuLIN 70/30 (70-30) 100 UNIT/ML Susp  What changed:   how much to take  how to take this  when to take this  Generic  drug: insulin 70/30   Doctor's comments: Requesting 1 year supply  INJECT SUBCUTANEOUSLY 2 TO 28  UNITS TWICE DAILY PER SLIDING  SCALE (MAX OF 28 UNITS PER DAY)            CONTINUE taking these medications        Instructions   CALCIUM PO   Take 1 Tablet by mouth every day.  Dose: 1 Tablet     One-A-Day Mens Tabs   Take 1 Tablet by mouth every day.  Dose: 1 Tablet     VITAMIN D PO   Take 1 Tablet by mouth every day.  Dose: 1 Tablet              Allergies  No Known Allergies    DIET  Orders Placed This Encounter   Procedures    Diet Order Diet: Full Liquid; Second Modifier: (optional): Consistent CHO (Diabetic)     Standing Status:   Standing     Number of Occurrences:   1     Order Specific Question:   Diet:     Answer:   Full Liquid [11]     Order Specific Question:   Second Modifier: (optional)     Answer:   Consistent CHO (Diabetic) [4]       ACTIVITY  As tolerated.  Weight bearing as tolerated    CONSULTATIONS  GI    PROCEDURES  EGD    LABORATORY  Lab Results   Component Value Date    SODIUM 132 (L) 03/15/2023    POTASSIUM 3.2 (L) 03/15/2023    CHLORIDE 100 03/15/2023    CO2 27 03/15/2023    GLUCOSE 160 (H) 03/15/2023    BUN 9 03/15/2023    CREATININE 0.50 03/15/2023        Lab Results   Component Value Date    WBC 7.9 03/14/2023    HEMOGLOBIN 11.3 (L) 03/14/2023    HEMATOCRIT 34.8 (L) 03/14/2023    PLATELETCT 175 03/14/2023        Total time of the discharge process exceeds 45 minutes.

## 2023-03-15 NOTE — CARE PLAN
"The patient is Stable - Low risk of patient condition declining or worsening    Shift Goals  Clinical Goals: patient will report decreased pain throught the shift  Patient Goals: to have \"real food\"  Family Goals: n/a    Progress made toward(s) clinical / shift goals:  ***    Patient is not progressing towards the following goals:      "

## 2023-03-15 NOTE — CARE PLAN
The patient is Stable - Low risk of patient condition declining or worsening    Shift Goals  Clinical Goals: Patient will report pain improvement after interventions this shift  Patient Goals: Pain control, rest      Progress made toward(s) clinical / shift goals:  Patient reports pain improvement after interventions, see MAR.     Patient is not progressing towards the following goals: N/A

## 2023-03-15 NOTE — DIETARY
Nutrition Services - Brief Update    RD visited pt at bedside for diabetes diet education. Pt declined at this time due to feeling tired.    PLAN/RECOMMEND - RD to follow up for diet education as able.

## 2023-03-16 VITALS
BODY MASS INDEX: 26.87 KG/M2 | WEIGHT: 198.41 LBS | OXYGEN SATURATION: 95 % | DIASTOLIC BLOOD PRESSURE: 69 MMHG | HEART RATE: 80 BPM | HEIGHT: 72 IN | SYSTOLIC BLOOD PRESSURE: 112 MMHG | TEMPERATURE: 98.4 F | RESPIRATION RATE: 18 BRPM

## 2023-03-16 LAB — GLUCOSE BLD STRIP.AUTO-MCNC: 123 MG/DL (ref 65–99)

## 2023-03-16 PROCEDURE — A9270 NON-COVERED ITEM OR SERVICE: HCPCS | Performed by: INTERNAL MEDICINE

## 2023-03-16 PROCEDURE — 99239 HOSP IP/OBS DSCHRG MGMT >30: CPT | Performed by: INTERNAL MEDICINE

## 2023-03-16 PROCEDURE — 82962 GLUCOSE BLOOD TEST: CPT

## 2023-03-16 PROCEDURE — 700102 HCHG RX REV CODE 250 W/ 637 OVERRIDE(OP): Performed by: INTERNAL MEDICINE

## 2023-03-16 RX ADMIN — SUCRALFATE 1 G: 1 SUSPENSION ORAL at 05:22

## 2023-03-16 RX ADMIN — OMEPRAZOLE 40 MG: 20 CAPSULE, DELAYED RELEASE ORAL at 05:22

## 2023-03-16 ASSESSMENT — ENCOUNTER SYMPTOMS: ABDOMINAL PAIN: 1

## 2023-03-16 NOTE — DISCHARGE SUMMARY
"Discharge Summary    CHIEF COMPLAINT ON ADMISSION  Chief Complaint   Patient presents with    Abdominal Pain     7/10 pain stays in lower abdomen       Reason for Admission  EMS     Admission Date  3/11/2023    CODE STATUS  Prior    HPI & HOSPITAL COURSE  Per notes, \"57 y.o. male with past medical history of CKD stage III, diabetes mellitus type 2, hard of hearing, and hypercholesterolemia who presented to the ED with chief complaint of abdominal pain.     Reports onset of left upper quadrant discomfort yesterday.  Described as constant, non-radiating, sudden onset.  No aggravating or alleviating factors.  Associated nausea, vomiting, and diarrhea.  1 episode of loose stool.  Denies hematemesis, hematochezia, melena, flank pain, dysuria, urinary frequency/urgency, fevers, and chest pain.  Denies known sick contacts.  No recent antibiotic use.  Denies recent travel or exposure to untreated water.  History of appendectomy.  states he has not been checking his blood sugar but he has been using his NovoLog 70/30 as prescribed.       In ED, BC remarkable for WBC 17.6, MCV 79.9, poly neutrophils 86.9, absolute neutrophils 15.3, lymphocytes 6.4, and monocyte 1.05.  CMP remarkable for chloride 91, anion gap 19, glucose 358, BUN 39, alk phos 104.  Lactic acid negative.  Procalcitonin negative.  VBG showed pH 7.49, CO2 39, PO2 55, HCO3 29.  No evidence of DKA.  CT A/P showed horseshoe kidney with nonobstructive left renal stones and diverticulosis without evidence of diverticulitis.  Given 3 L normal saline, 4 mg Zofran, and 4 mg IV morphine.\"    Patient was initially admitted and treated for elevated blood sugars and intractable nausea and vomiting.  Additionally while he was in the hospital he did develop some bleeding.  He is evaluated gastroenterology and underwent an EGD on 3/14 which showed severe LA class D esophagitis.  Gastroenterology recommended a repeat EGD in 3 months, PPI twice daily for 2 months then once a " day, Carafate and follow-up in the outpatient setting.  Patient progressed well, was tolerating diet and able to discharge safely and be managed in the outpatient setting.  He will need to follow-up closely with gastroenterology as well as PCP within 1 to 2 weeks.    Patient was initially scheduled to discharge on 3/15, however was still having some abdominal pain so discharge was canceled.  Patient left AMA, overnight, prior to my evaluation on 3/16.    Therefore, he is discharged in good and stable condition to home with close outpatient follow-up.    The patient met 2-midnight criteria for an inpatient stay at the time of discharge.    Discharge Date  3/15/2023    FOLLOW UP ITEMS POST DISCHARGE  FU with PCP  FU with GI    DISCHARGE DIAGNOSES  Principal Problem:    Diabetic ketoacidosis without coma associated with type 2 diabetes mellitus (HCC) POA: Unknown  Active Problems:    Metabolic acidosis, increased anion gap POA: Unknown    Noncompliance with medications POA: Yes    Diabetes mellitus type 2, insulin dependent (HCC) POA: Unknown    Abdominal pain POA: Yes  Resolved Problems:    Hyperosmolar hyperglycemic state (HHS) (HCC) POA: Unknown    SIRS (systemic inflammatory response syndrome) (HCC) POA: Unknown      FOLLOW UP  No future appointments.  NATALEE TariqPOlgaROlgaN.  [address] [facility] [address]  [facility] [id number] [phone]    Schedule an appointment as soon as possible for a visit in 1 week(s)      Atilio Freed M.D.  [address]  [facility] [address]  [phone]    Schedule an appointment as soon as possible for a visit in 1 week(s)      MEDICATIONS ON DISCHARGE     Medication List        START taking these medications        Instructions   omeprazole 40 MG delayed-release capsule  Commonly known as: PRILOSEC   Take 1 Capsule by mouth 2 times a day for 30 days.  Dose: 40 mg     sucralfate 1 GM/10ML Susp  Commonly known as: CARAFATE   Take 10 mL by mouth every 6 hours for 14 days.  Dose: 1 g             CHANGE how you take these medications        Instructions   HumuLIN 70/30 (70-30) 100 UNIT/ML Susp  What changed:   how much to take  how to take this  when to take this  Generic drug: insulin 70/30   Doctor's comments: Requesting 1 year supply  INJECT SUBCUTANEOUSLY 2 TO 28  UNITS TWICE DAILY PER SLIDING  SCALE (MAX OF 28 UNITS PER DAY)            CONTINUE taking these medications        Instructions   CALCIUM PO   Take 1 Tablet by mouth every day.  Dose: 1 Tablet     One-A-Day Mens Tabs   Take 1 Tablet by mouth every day.  Dose: 1 Tablet     VITAMIN D PO   Take 1 Tablet by mouth every day.  Dose: 1 Tablet              Allergies  No Known Allergies    DIET  No orders of the defined types were placed in this encounter.      ACTIVITY  As tolerated.  Weight bearing as tolerated    CONSULTATIONS  GI    PROCEDURES  EGD    LABORATORY  Lab Results   Component Value Date    SODIUM 132 (L) 03/15/2023    POTASSIUM 3.2 (L) 03/15/2023    CHLORIDE 100 03/15/2023    CO2 27 03/15/2023    GLUCOSE 160 (H) 03/15/2023    BUN 9 03/15/2023    CREATININE 0.50 03/15/2023        Lab Results   Component Value Date    WBC 7.9 03/14/2023    HEMOGLOBIN 11.3 (L) 03/14/2023    HEMATOCRIT 34.8 (L) 03/14/2023    PLATELETCT 175 03/14/2023        Total time of the discharge process exceeds 45 minutes.

## 2023-03-16 NOTE — PROGRESS NOTES
Received bedside report from day shift RN at 19:15.   Assumed pt care.   Pt seen AO4, pain reported at 5/10, prn pain meds  declined at this moment, denies nausea at this time.   POC discussed.   Safety and fall precautions in place.   Instructed pt to use call light, verbalized understanding.   Call light kept within reach.  No other needs at this time.   Will continue to monitor.

## 2023-03-16 NOTE — PROGRESS NOTES
Hospital Medicine Daily Progress Note    Date of Service  3/16/2023    Chief Complaint  Christoph Taylor is a 57 y.o. male admitted 3/11/2023 with abd pain , n/v    Hospital Course  56 yo w/ hx of DM2 with poor adherence to insulin, prior admissions for DKA, CKD 3, HLD, pw abd pain n/v found to have elev BG in 500s, AG acidosis, b hydroxybutyric acid 4.5, c/f DKA. Given IVFs, insulin with improvement of BG to mid 200s, admitted inpatient for further care.    Interval Problem Update  Patient was initially scheduled to discharge on 3/15, however was still having some abdominal pain with meals so diet was scaled back to clear liquid and discharge was canceled.     I have discussed this patient's plan of care and discharge plan at IDT rounds today with Case Management, Nursing, Nursing leadership, and other members of the IDT team.    Code Status  Prior    Disposition  Patient is not medically cleared for discharge.   Anticipate discharge to to home with close outpatient follow-up.  I have placed the appropriate orders for post-discharge needs.    Review of Systems  Review of Systems   Gastrointestinal:  Positive for abdominal pain.   All other systems reviewed and are negative.     Physical Exam  Temp:  [36.4 °C (97.6 °F)-36.9 °C (98.4 °F)] 36.9 °C (98.4 °F)  Pulse:  [69-80] 80  Resp:  [16-18] 18  BP: (112-144)/(69-78) 112/69  SpO2:  [91 %-95 %] 95 %    Physical Exam  Vitals and nursing note reviewed.   Constitutional:       General: He is not in acute distress.     Appearance: He is not ill-appearing.      Comments: Drowsy but arousable  During interview able to sit up and partake in interview   Cardiovascular:      Rate and Rhythm: Normal rate and regular rhythm.      Heart sounds: No murmur heard.  Pulmonary:      Effort: Pulmonary effort is normal.      Breath sounds: Normal breath sounds.   Abdominal:      General: There is no distension.      Palpations: Abdomen is soft.      Tenderness: There is abdominal  "tenderness. There is no guarding.   Musculoskeletal:         General: Normal range of motion.      Cervical back: Normal range of motion.      Right lower leg: No edema.      Left lower leg: No edema.   Skin:     General: Skin is warm and dry.   Neurological:      General: No focal deficit present.      Mental Status: Mental status is at baseline.      Comments: drowsy   Psychiatric:      Comments: Depressed affect       Fluids    Intake/Output Summary (Last 24 hours) at 3/16/2023 1156  Last data filed at 3/16/2023 0300  Gross per 24 hour   Intake 1929.67 ml   Output --   Net 1929.67 ml         Laboratory  Recent Labs     03/14/23  0238   WBC 7.9   RBC 4.07*   HEMOGLOBIN 11.3*   HEMATOCRIT 34.8*   MCV 85.5   MCH 27.8   MCHC 32.5*   RDW 39.2   PLATELETCT 175   MPV 9.6       Recent Labs     03/14/23  0238 03/15/23  0114   SODIUM 138 132*   POTASSIUM 3.5* 3.2*   CHLORIDE 103 100   CO2 27 27   GLUCOSE 152* 160*   BUN 12 9   CREATININE 0.52 0.50   CALCIUM 7.4* 7.3*                     Imaging  CT-ABDOMEN-PELVIS WITH   Final Result      1.  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      2.  Diverticulosis without evidence of diverticulitis.             Assessment/Plan  * Diabetic ketoacidosis without coma associated with type 2 diabetes mellitus (HCC)  Assessment & Plan  Patient pw abd pain, n/v and dehydration  DKA vs HHS  BG level 595, AG of 19, B hydroxy 4.5, UA w/ small ketones  S/p IVFs and SSI  Back in Oct had very similar presentation, A1c 12.7%, issues w/ adherence to medications  ADAT - tolerating PO  Increase lantus to 15 units BID, would dc on this  Diabetic educator cs    Abdominal pain- (present on admission)  Assessment & Plan  Thought to be 2/2 DKA but with improvement in BG, his epigastric pain has not improved. Pt reports \"acid\" like feel to his GI tract.  Lipase nl, CT A/P unremarkable  Started PPI, continue  GI cocktail x 1  GI c/s , Dr. Blackwell w/ [facility] consulted, follow recommendations  EGD " on 3/14  Getting IV morphine and PO oxy prn pain-still having some abdominal pain and requiring IV pain medications-monitor for side effects such as constipation, altered mental status.    Diabetes mellitus type 2, insulin dependent (HCC)  Assessment & Plan  -Very poorly controlled, pw DKA  - A1c in the past up to 16, in Oct 12%  -On NovoLog 70/30 outpatient. Getting lantus 15 units BID here, would continue this as outpatient  -diabetic diet.  Monitor with Accu-Cheks and a.m. labs    Noncompliance with medications- (present on admission)  Assessment & Plan  Diabetes education    Metabolic acidosis, increased anion gap  Assessment & Plan  2/2 ketoacidosis  Gap closed  Continue to monitor very closely as patient will resume eating full diet today         VTE prophylaxis: enoxaparin ppx    I have performed a physical exam and reviewed and updated ROS and Plan today (3/16/2023). In review of yesterday's note (3/15/2023), there are no changes except as documented above.

## 2023-03-16 NOTE — PROGRESS NOTES
"05:00am CNA informed this nurse pt is frustrated, wants to eat solid food. Threatening to leave AMA if he does not get food this morning. Pt was given broth 3x overnight.  05:08 am this nurse to bedside, explained to pt his current diet orders. Offered more broth. Pt verbalize \"I want real food. I feel like I am dwindling away.\"  05:15 MD notified. Diet orders changed to CHO.  05:17 Brought warm cream wheat cereal to pt. AM meds given. Informed pt that we will call kitchen to make sure he gets updated diet orders for breakfast.  06:00 pt upset, wants to leave AMA. Charge RN informed, charge RN to bedside.   06:16am Informed hospitalist pt leaving AMA. AMA papers signed. PIV terminated.   06:19am Discharged AMA  "

## 2023-03-16 NOTE — CARE PLAN
The patient is Stable - Low risk of patient condition declining or worsening    Shift Goals  Clinical Goals: Monitor BG, maintain safety, zero falls  Patient Goals: Eat during this shift  Family Goals: n/a    Progress made toward(s) clinical / shift goals:  Accuchecks performed ACHS, informed MD of BG at 86, long acting insulin scheduled for 2300 held per order. Safety measures kept in place, assisted pt with ambulation using FWW. Broth brought to pt 3x this shift, pt was able to consume broth and tolerate diet. Pt has had zero falls, safety maintained throughout shift.    Patient is not progressing towards the following goals: N/a

## 2023-03-17 LAB
BACTERIA BLD CULT: NORMAL
BACTERIA BLD CULT: NORMAL
SIGNIFICANT IND 70042: NORMAL
SIGNIFICANT IND 70042: NORMAL
SITE SITE: NORMAL
SITE SITE: NORMAL
SOURCE SOURCE: NORMAL
SOURCE SOURCE: NORMAL

## 2023-04-05 ENCOUNTER — TELEPHONE (OUTPATIENT)
Dept: MEDICAL GROUP | Facility: LAB | Age: 58
End: 2023-04-05
Payer: COMMERCIAL

## 2023-04-05 DIAGNOSIS — K21.00 GASTROESOPHAGEAL REFLUX DISEASE WITH ESOPHAGITIS WITHOUT HEMORRHAGE: ICD-10-CM

## 2023-04-05 RX ORDER — OMEPRAZOLE 40 MG/1
40 CAPSULE, DELAYED RELEASE ORAL DAILY
Qty: 30 CAPSULE | Refills: 3 | Status: SHIPPED | OUTPATIENT
Start: 2023-04-05 | End: 2023-04-05

## 2023-04-05 RX ORDER — SUCRALFATE ORAL 1 G/10ML
1 SUSPENSION ORAL 4 TIMES DAILY
Qty: 560 ML | Refills: 0 | Status: SHIPPED | OUTPATIENT
Start: 2023-04-05 | End: 2023-04-17

## 2023-04-05 RX ORDER — OMEPRAZOLE 40 MG/1
40 CAPSULE, DELAYED RELEASE ORAL DAILY
Qty: 90 CAPSULE | Refills: 3 | Status: SHIPPED | OUTPATIENT
Start: 2023-04-05 | End: 2023-06-08

## 2023-04-05 NOTE — TELEPHONE ENCOUNTER
Patient LVM stating that he would like to get a call back and discuss some issues he is having with his back and esophagus .     CALL BACK # [phone]       Please advice

## 2023-04-05 NOTE — TELEPHONE ENCOUNTER
Called patient. Reports that he has been having some difficulties with his stomach and back. He does have an appointment with Cynthia Kearns tomorrow. Reports that he was seen in the ER in the beginning of March and had an endoscopy. Reports that he left AMA, he did not get a referral to gastroenterology. Reports that he continues to have symptoms that he had in March. Feels like symptoms have been worsening. He is unable to eat much, reports some fecal incontinence, mid-epigastric pain, diarrhea, weight loss. Denies melena, vomiting. Reports that he has not been taking medication that was prescribed to him at hospital discharge.     Plan  Patient to take medication as prescribed. Urgent referral placed to GI. Supportive care, differential diagnoses, and indications for immediate follow-up discussed with patient. Pathogenesis of diagnosis discussed including typical length and natural progression. Instructed to return to clinic or nearest emergency department for any change in condition, further concerns, or worsening of symptoms.

## 2023-04-06 PROBLEM — T14.90XA TRAUMA: Status: ACTIVE | Noted: 2023-04-06

## 2023-04-06 PROBLEM — R93.7 ABNORMAL CT OF THORACIC SPINE: Status: ACTIVE | Noted: 2023-04-06

## 2023-04-17 DIAGNOSIS — K21.00 GASTROESOPHAGEAL REFLUX DISEASE WITH ESOPHAGITIS WITHOUT HEMORRHAGE: ICD-10-CM

## 2023-04-17 RX ORDER — SUCRALFATE ORAL 1 G/10ML
SUSPENSION ORAL
Qty: 560 ML | Refills: 0 | Status: SHIPPED | OUTPATIENT
Start: 2023-04-17 | End: 2023-06-08

## 2023-06-08 ENCOUNTER — APPOINTMENT (OUTPATIENT)
Dept: RADIOLOGY | Facility: MEDICAL CENTER | Age: 58
DRG: 628 | End: 2023-06-08
Attending: EMERGENCY MEDICINE
Payer: COMMERCIAL

## 2023-06-08 ENCOUNTER — HOSPITAL ENCOUNTER (INPATIENT)
Facility: MEDICAL CENTER | Age: 58
LOS: 8 days | DRG: 628 | End: 2023-06-16
Attending: EMERGENCY MEDICINE | Admitting: HOSPITALIST
Payer: COMMERCIAL

## 2023-06-08 DIAGNOSIS — R10.10 PAIN OF UPPER ABDOMEN: ICD-10-CM

## 2023-06-08 DIAGNOSIS — M86.9 OSTEOMYELITIS OF RIGHT HAND, UNSPECIFIED TYPE (HCC): ICD-10-CM

## 2023-06-08 DIAGNOSIS — G93.41 ACUTE METABOLIC ENCEPHALOPATHY: ICD-10-CM

## 2023-06-08 DIAGNOSIS — R10.9 ABDOMINAL PAIN, UNSPECIFIED ABDOMINAL LOCATION: ICD-10-CM

## 2023-06-08 DIAGNOSIS — L03.011 CELLULITIS OF FINGER OF RIGHT HAND: ICD-10-CM

## 2023-06-08 DIAGNOSIS — M54.6 CHRONIC MIDLINE THORACIC BACK PAIN: ICD-10-CM

## 2023-06-08 DIAGNOSIS — Z91.148 NONCOMPLIANCE WITH MEDICATIONS: ICD-10-CM

## 2023-06-08 DIAGNOSIS — E78.5 DYSLIPIDEMIA: ICD-10-CM

## 2023-06-08 DIAGNOSIS — Z79.4 DIABETES MELLITUS TYPE 2, INSULIN DEPENDENT (HCC): ICD-10-CM

## 2023-06-08 DIAGNOSIS — R79.89 ELEVATED TROPONIN: ICD-10-CM

## 2023-06-08 DIAGNOSIS — E87.29 METABOLIC ACIDOSIS, INCREASED ANION GAP: ICD-10-CM

## 2023-06-08 DIAGNOSIS — E11.65 UNCONTROLLED TYPE 2 DIABETES MELLITUS WITH HYPERGLYCEMIA (HCC): ICD-10-CM

## 2023-06-08 DIAGNOSIS — K52.9 ENTERITIS: ICD-10-CM

## 2023-06-08 DIAGNOSIS — E11.9 DIABETES MELLITUS TYPE 2, INSULIN DEPENDENT (HCC): ICD-10-CM

## 2023-06-08 DIAGNOSIS — G89.29 CHRONIC MIDLINE THORACIC BACK PAIN: ICD-10-CM

## 2023-06-08 DIAGNOSIS — E11.10 DIABETIC KETOACIDOSIS WITHOUT COMA ASSOCIATED WITH TYPE 2 DIABETES MELLITUS (HCC): ICD-10-CM

## 2023-06-08 DIAGNOSIS — M86.141 OTHER ACUTE OSTEOMYELITIS OF RIGHT HAND (HCC): ICD-10-CM

## 2023-06-08 PROBLEM — L03.113 CELLULITIS OF RIGHT UPPER EXTREMITY: Status: ACTIVE | Noted: 2023-06-08

## 2023-06-08 PROBLEM — L03.90 CELLULITIS: Status: ACTIVE | Noted: 2023-06-08

## 2023-06-08 LAB
ALBUMIN SERPL BCP-MCNC: 3 G/DL (ref 3.2–4.9)
ALBUMIN/GLOB SERPL: 1 G/DL
ALP SERPL-CCNC: 85 U/L (ref 30–99)
ALT SERPL-CCNC: 11 U/L (ref 2–50)
ANION GAP SERPL CALC-SCNC: 24 MMOL/L (ref 7–16)
AST SERPL-CCNC: 9 U/L (ref 12–45)
BASOPHILS # BLD AUTO: 0.1 % (ref 0–1.8)
BASOPHILS # BLD: 0.01 K/UL (ref 0–0.12)
BILIRUB SERPL-MCNC: 0.4 MG/DL (ref 0.1–1.5)
BUN SERPL-MCNC: 28 MG/DL (ref 8–22)
CALCIUM ALBUM COR SERPL-MCNC: 9.5 MG/DL (ref 8.5–10.5)
CALCIUM SERPL-MCNC: 8.7 MG/DL (ref 8.4–10.2)
CHLORIDE SERPL-SCNC: 91 MMOL/L (ref 96–112)
CO2 SERPL-SCNC: 21 MMOL/L (ref 20–33)
CREAT SERPL-MCNC: 0.75 MG/DL (ref 0.5–1.4)
EKG IMPRESSION: NORMAL
EKG IMPRESSION: NORMAL
EOSINOPHIL # BLD AUTO: 0 K/UL (ref 0–0.51)
EOSINOPHIL NFR BLD: 0 % (ref 0–6.9)
ERYTHROCYTE [DISTWIDTH] IN BLOOD BY AUTOMATED COUNT: 38.4 FL (ref 35.9–50)
GFR SERPLBLD CREATININE-BSD FMLA CKD-EPI: 104 ML/MIN/1.73 M 2
GLOBULIN SER CALC-MCNC: 3.1 G/DL (ref 1.9–3.5)
GLUCOSE BLD STRIP.AUTO-MCNC: 377 MG/DL (ref 65–99)
GLUCOSE BLD STRIP.AUTO-MCNC: 388 MG/DL (ref 65–99)
GLUCOSE SERPL-MCNC: 401 MG/DL (ref 65–99)
HCT VFR BLD AUTO: 37.9 % (ref 42–52)
HGB BLD-MCNC: 12.6 G/DL (ref 14–18)
IMM GRANULOCYTES # BLD AUTO: 0.06 K/UL (ref 0–0.11)
IMM GRANULOCYTES NFR BLD AUTO: 0.5 % (ref 0–0.9)
LACTATE SERPL-SCNC: 1 MMOL/L (ref 0.5–2)
LACTATE SERPL-SCNC: 1.4 MMOL/L (ref 0.5–2)
LIPASE SERPL-CCNC: 13 U/L (ref 7–58)
LYMPHOCYTES # BLD AUTO: 0.72 K/UL (ref 1–4.8)
LYMPHOCYTES NFR BLD: 5.7 % (ref 22–41)
MCH RBC QN AUTO: 28.6 PG (ref 27–33)
MCHC RBC AUTO-ENTMCNC: 33.2 G/DL (ref 32.3–36.5)
MCV RBC AUTO: 85.9 FL (ref 81.4–97.8)
MONOCYTES # BLD AUTO: 0.56 K/UL (ref 0–0.85)
MONOCYTES NFR BLD AUTO: 4.4 % (ref 0–13.4)
NEUTROPHILS # BLD AUTO: 11.37 K/UL (ref 1.82–7.42)
NEUTROPHILS NFR BLD: 89.3 % (ref 44–72)
NRBC # BLD AUTO: 0 K/UL
NRBC BLD-RTO: 0 /100 WBC (ref 0–0.2)
PLATELET # BLD AUTO: 229 K/UL (ref 164–446)
PMV BLD AUTO: 9.1 FL (ref 9–12.9)
POTASSIUM SERPL-SCNC: 4.5 MMOL/L (ref 3.6–5.5)
PROT SERPL-MCNC: 6.1 G/DL (ref 6–8.2)
RBC # BLD AUTO: 4.41 M/UL (ref 4.7–6.1)
SODIUM SERPL-SCNC: 136 MMOL/L (ref 135–145)
TROPONIN T SERPL-MCNC: 27 NG/L (ref 6–19)
WBC # BLD AUTO: 12.7 K/UL (ref 4.8–10.8)

## 2023-06-08 PROCEDURE — 700102 HCHG RX REV CODE 250 W/ 637 OVERRIDE(OP): Performed by: EMERGENCY MEDICINE

## 2023-06-08 PROCEDURE — 80053 COMPREHEN METABOLIC PANEL: CPT

## 2023-06-08 PROCEDURE — 94760 N-INVAS EAR/PLS OXIMETRY 1: CPT

## 2023-06-08 PROCEDURE — 84484 ASSAY OF TROPONIN QUANT: CPT

## 2023-06-08 PROCEDURE — 700117 HCHG RX CONTRAST REV CODE 255: Performed by: EMERGENCY MEDICINE

## 2023-06-08 PROCEDURE — 700102 HCHG RX REV CODE 250 W/ 637 OVERRIDE(OP): Performed by: HOSPITALIST

## 2023-06-08 PROCEDURE — 36415 COLL VENOUS BLD VENIPUNCTURE: CPT

## 2023-06-08 PROCEDURE — 700111 HCHG RX REV CODE 636 W/ 250 OVERRIDE (IP): Performed by: HOSPITALIST

## 2023-06-08 PROCEDURE — 99223 1ST HOSP IP/OBS HIGH 75: CPT | Performed by: HOSPITALIST

## 2023-06-08 PROCEDURE — 700101 HCHG RX REV CODE 250: Performed by: HOSPITALIST

## 2023-06-08 PROCEDURE — 700111 HCHG RX REV CODE 636 W/ 250 OVERRIDE (IP): Performed by: EMERGENCY MEDICINE

## 2023-06-08 PROCEDURE — 82962 GLUCOSE BLOOD TEST: CPT

## 2023-06-08 PROCEDURE — 93005 ELECTROCARDIOGRAM TRACING: CPT | Performed by: EMERGENCY MEDICINE

## 2023-06-08 PROCEDURE — 85025 COMPLETE CBC W/AUTO DIFF WBC: CPT

## 2023-06-08 PROCEDURE — 770001 HCHG ROOM/CARE - MED/SURG/GYN PRIV*

## 2023-06-08 PROCEDURE — 83605 ASSAY OF LACTIC ACID: CPT

## 2023-06-08 PROCEDURE — 700105 HCHG RX REV CODE 258: Performed by: EMERGENCY MEDICINE

## 2023-06-08 PROCEDURE — 99285 EMERGENCY DEPT VISIT HI MDM: CPT

## 2023-06-08 PROCEDURE — 93010 ELECTROCARDIOGRAM REPORT: CPT | Performed by: INTERNAL MEDICINE

## 2023-06-08 PROCEDURE — 74177 CT ABD & PELVIS W/CONTRAST: CPT

## 2023-06-08 PROCEDURE — 96365 THER/PROPH/DIAG IV INF INIT: CPT

## 2023-06-08 PROCEDURE — 96375 TX/PRO/DX INJ NEW DRUG ADDON: CPT

## 2023-06-08 PROCEDURE — 700105 HCHG RX REV CODE 258: Performed by: HOSPITALIST

## 2023-06-08 PROCEDURE — 83690 ASSAY OF LIPASE: CPT

## 2023-06-08 PROCEDURE — A9270 NON-COVERED ITEM OR SERVICE: HCPCS | Performed by: EMERGENCY MEDICINE

## 2023-06-08 RX ORDER — FAMOTIDINE 20 MG/1
20 TABLET, FILM COATED ORAL ONCE
Status: COMPLETED | OUTPATIENT
Start: 2023-06-08 | End: 2023-06-08

## 2023-06-08 RX ORDER — ENOXAPARIN SODIUM 100 MG/ML
40 INJECTION SUBCUTANEOUS DAILY
Status: DISCONTINUED | OUTPATIENT
Start: 2023-06-09 | End: 2023-06-16 | Stop reason: HOSPADM

## 2023-06-08 RX ORDER — ACETAMINOPHEN 325 MG/1
650 TABLET ORAL EVERY 6 HOURS PRN
Status: DISCONTINUED | OUTPATIENT
Start: 2023-06-08 | End: 2023-06-16 | Stop reason: HOSPADM

## 2023-06-08 RX ORDER — SUCRALFATE 1 G/1
1 TABLET ORAL
Qty: 80 TABLET | Refills: 0 | Status: SHIPPED | OUTPATIENT
Start: 2023-06-08 | End: 2023-06-28

## 2023-06-08 RX ORDER — AMOXICILLIN 250 MG
2 CAPSULE ORAL 2 TIMES DAILY
Status: DISCONTINUED | OUTPATIENT
Start: 2023-06-08 | End: 2023-06-16 | Stop reason: HOSPADM

## 2023-06-08 RX ORDER — INSULIN LISPRO 100 [IU]/ML
0.2 INJECTION, SOLUTION INTRAVENOUS; SUBCUTANEOUS
Status: DISCONTINUED | OUTPATIENT
Start: 2023-06-09 | End: 2023-06-09

## 2023-06-08 RX ORDER — OMEPRAZOLE 40 MG/1
40 CAPSULE, DELAYED RELEASE ORAL 2 TIMES DAILY
Qty: 28 CAPSULE | Refills: 0 | Status: SHIPPED | OUTPATIENT
Start: 2023-06-08 | End: 2023-06-22

## 2023-06-08 RX ORDER — OXYCODONE HYDROCHLORIDE 5 MG/1
5 TABLET ORAL EVERY 6 HOURS PRN
Status: ACTIVE | OUTPATIENT
Start: 2023-06-08 | End: 2023-06-09

## 2023-06-08 RX ORDER — OXYCODONE HYDROCHLORIDE 5 MG/1
5 TABLET ORAL EVERY 8 HOURS PRN
Status: ON HOLD | COMMUNITY
Start: 2023-04-06 | End: 2023-06-16

## 2023-06-08 RX ORDER — METRONIDAZOLE 500 MG/100ML
500 INJECTION, SOLUTION INTRAVENOUS EVERY 8 HOURS
Status: DISCONTINUED | OUTPATIENT
Start: 2023-06-08 | End: 2023-06-09

## 2023-06-08 RX ORDER — SODIUM CHLORIDE 9 MG/ML
1000 INJECTION, SOLUTION INTRAVENOUS ONCE
Status: COMPLETED | OUTPATIENT
Start: 2023-06-08 | End: 2023-06-08

## 2023-06-08 RX ORDER — OMEPRAZOLE 20 MG/1
20 CAPSULE, DELAYED RELEASE ORAL ONCE
Status: COMPLETED | OUTPATIENT
Start: 2023-06-08 | End: 2023-06-08

## 2023-06-08 RX ORDER — INSULIN LISPRO 100 [IU]/ML
2-9 INJECTION, SOLUTION INTRAVENOUS; SUBCUTANEOUS
Status: DISCONTINUED | OUTPATIENT
Start: 2023-06-08 | End: 2023-06-09

## 2023-06-08 RX ORDER — OXYBUTYNIN CHLORIDE 5 MG/1
5 TABLET ORAL 3 TIMES DAILY
Status: DISCONTINUED | OUTPATIENT
Start: 2023-06-08 | End: 2023-06-08

## 2023-06-08 RX ORDER — BISACODYL 10 MG
10 SUPPOSITORY, RECTAL RECTAL
Status: DISCONTINUED | OUTPATIENT
Start: 2023-06-08 | End: 2023-06-16 | Stop reason: HOSPADM

## 2023-06-08 RX ORDER — ONDANSETRON 2 MG/ML
4 INJECTION INTRAMUSCULAR; INTRAVENOUS ONCE
Status: COMPLETED | OUTPATIENT
Start: 2023-06-08 | End: 2023-06-08

## 2023-06-08 RX ORDER — SODIUM CHLORIDE 9 MG/ML
INJECTION, SOLUTION INTRAVENOUS CONTINUOUS
Status: DISCONTINUED | OUTPATIENT
Start: 2023-06-08 | End: 2023-06-09

## 2023-06-08 RX ORDER — CEPHALEXIN 500 MG/1
500 CAPSULE ORAL 4 TIMES DAILY
Qty: 28 CAPSULE | Refills: 0 | Status: SHIPPED | OUTPATIENT
Start: 2023-06-08 | End: 2023-06-16

## 2023-06-08 RX ORDER — POLYETHYLENE GLYCOL 3350 17 G/17G
1 POWDER, FOR SOLUTION ORAL
Status: DISCONTINUED | OUTPATIENT
Start: 2023-06-08 | End: 2023-06-16 | Stop reason: HOSPADM

## 2023-06-08 RX ORDER — FAMOTIDINE 20 MG/1
20 TABLET, FILM COATED ORAL 2 TIMES DAILY
Qty: 14 TABLET | Refills: 0 | Status: SHIPPED | OUTPATIENT
Start: 2023-06-08 | End: 2023-06-16

## 2023-06-08 RX ADMIN — ONDANSETRON 4 MG: 2 INJECTION INTRAMUSCULAR; INTRAVENOUS at 13:47

## 2023-06-08 RX ADMIN — SODIUM CHLORIDE 1000 ML: 9 INJECTION, SOLUTION INTRAVENOUS at 13:47

## 2023-06-08 RX ADMIN — LIDOCAINE HYDROCHLORIDE 30 ML: 20 SOLUTION OROPHARYNGEAL at 13:46

## 2023-06-08 RX ADMIN — INSULIN LISPRO 8 UNITS: 100 INJECTION, SOLUTION INTRAVENOUS; SUBCUTANEOUS at 20:36

## 2023-06-08 RX ADMIN — OMEPRAZOLE 20 MG: 20 CAPSULE, DELAYED RELEASE ORAL at 15:59

## 2023-06-08 RX ADMIN — SODIUM CHLORIDE: 9 INJECTION, SOLUTION INTRAVENOUS at 20:44

## 2023-06-08 RX ADMIN — CEFAZOLIN 2 G: 2 INJECTION, POWDER, FOR SOLUTION INTRAMUSCULAR; INTRAVENOUS at 14:22

## 2023-06-08 RX ADMIN — FAMOTIDINE 20 MG: 20 TABLET, FILM COATED ORAL at 15:59

## 2023-06-08 RX ADMIN — IOHEXOL 100 ML: 350 INJECTION, SOLUTION INTRAVENOUS at 17:24

## 2023-06-08 RX ADMIN — METRONIDAZOLE 500 MG: 5 INJECTION, SOLUTION INTRAVENOUS at 20:53

## 2023-06-08 RX ADMIN — SODIUM CHLORIDE: 9 INJECTION, SOLUTION INTRAVENOUS at 21:08

## 2023-06-08 RX ADMIN — CEFTRIAXONE SODIUM 1000 MG: 1 INJECTION, POWDER, FOR SOLUTION INTRAMUSCULAR; INTRAVENOUS at 22:44

## 2023-06-08 RX ADMIN — INSULIN GLARGINE-YFGN 5 UNITS: 100 INJECTION, SOLUTION SUBCUTANEOUS at 20:35

## 2023-06-08 ASSESSMENT — ENCOUNTER SYMPTOMS
FLANK PAIN: 0
ABDOMINAL PAIN: 1
BRUISES/BLEEDS EASILY: 0
BLOOD IN STOOL: 0
FOCAL WEAKNESS: 0
VOMITING: 1
NERVOUS/ANXIOUS: 0
FEVER: 0
CHILLS: 0
EYE REDNESS: 0
COUGH: 0
MYALGIAS: 0
DIARRHEA: 0
NAUSEA: 1
SHORTNESS OF BREATH: 0
STRIDOR: 0
EYE DISCHARGE: 0

## 2023-06-08 ASSESSMENT — FIBROSIS 4 INDEX
FIB4 SCORE: 0.96
FIB4 SCORE: 0.7

## 2023-06-08 ASSESSMENT — PAIN DESCRIPTION - PAIN TYPE
TYPE: ACUTE PAIN
TYPE: ACUTE PAIN

## 2023-06-08 NOTE — ED NOTES
Pharmacy Medication Reconciliation      ~Medication reconciliation updated and complete per patient at bedside   ~Allergies have been verified   ~No oral ABX within the last 30 days  ~Patient home pharmacy :  [facility]

## 2023-06-08 NOTE — ED PROVIDER NOTES
"ED Provider Note    CHIEF COMPLAINT  Chief Complaint   Patient presents with    Epigastric Pain     Pt reports pain above his belly button that has been there for a a \"few days\", pt has history of abd pain with GI follow up to happen 6/15  Pt states that over the last few days he has had three episodes of vomiting   Pt was initially hypotensive with pressure 70/30 upon EMS arrival post 1L IV fluids improved to 98/66 and 5 mg of morphine was given. Pt than became hypoxic requiring 3L NC. ERP called to bedside due to pt presentation          LIMITATION TO HISTORY   Select: None.    HPI    Christoph Taylor is a 58 y.o. male with a history of type 2 diabetes and recent endoscopy that revealed severe esophagitis and PPI treatment twice daily for 2 months then 1 day.  Comes in today with epigastric pain.  Severe epigastric area.  Nonradiating.  Patient is stating that his over-the-counter antacid medication is not helping.  No significant exacerbating factor associated 3 episodes of vomiting.  No diarrhea.    As per the nurses report from EMS he was initially found to be hypotensive he was given a liter of fluid blood pressure was 96.  And gave him morphine.  I was called to the room as the patient appeared dusky and had low respiratory rate.  When he came in patient is awake alert.  He easily compl conversive maintaining his airway.  Sats when 2 L are 98%.    Chart review from recent hospitalization as noted below.  Specifically the severe esophagitis the proton pump inhibitor is GI follow-up.    In addition, as per the nurse the EMS stated an EKG that was \"terrible\".  They were unable to print it for us.    OUTSIDE HISTORIAN(S):  Select: None at this time.    EXTERNAL RECORDS REVIEWED  Select: Other     Admission Date  3/11/2023     CODE STATUS  Prior     HPI & HOSPITAL COURSE  Per notes, \"57 y.o. male with past medical history of CKD stage III, diabetes mellitus type 2, hard of hearing, and hypercholesterolemia who " "presented to the ED with chief complaint of abdominal pain.     Reports onset of left upper quadrant discomfort yesterday.  Described as constant, non-radiating, sudden onset.  No aggravating or alleviating factors.  Associated nausea, vomiting, and diarrhea.  1 episode of loose stool.  Denies hematemesis, hematochezia, melena, flank pain, dysuria, urinary frequency/urgency, fevers, and chest pain.  Denies known sick contacts.  No recent antibiotic use.  Denies recent travel or exposure to untreated water.  History of appendectomy.  states he has not been checking his blood sugar but he has been using his NovoLog 70/30 as prescribed.       In ED, BC remarkable for WBC 17.6, MCV 79.9, poly neutrophils 86.9, absolute neutrophils 15.3, lymphocytes 6.4, and monocyte 1.05.  CMP remarkable for chloride 91, anion gap 19, glucose 358, BUN 39, alk phos 104.  Lactic acid negative.  Procalcitonin negative.  VBG showed pH 7.49, CO2 39, PO2 55, HCO3 29.  No evidence of DKA.  CT A/P showed horseshoe kidney with nonobstructive left renal stones and diverticulosis without evidence of diverticulitis.  Given 3 L normal saline, 4 mg Zofran, and 4 mg IV morphine.\"     Patient was initially admitted and treated for elevated blood sugars and intractable nausea and vomiting.  Additionally while he was in the hospital he did develop some bleeding.  He is evaluated gastroenterology and underwent an EGD on 3/14 which showed severe LA class D esophagitis.  Gastroenterology recommended a repeat EGD in 3 months, PPI twice daily for 2 months then once a day, Carafate and follow-up in the outpatient setting.  Patient progressed well, was tolerating diet and able to discharge safely and be managed in the outpatient setting.  He will need to follow-up closely with gastroenterology as well as PCP within 1 to 2 weeks.     Patient was initially scheduled to discharge on 3/15, however was still having some abdominal pain so discharge was canceled.  " Patient left AMA, overnight, prior to my evaluation on 3/16.     Therefore, he is discharged in good and stable condition to home with close outpatient follow-up.     The patient met 2-midnight criteria for an inpatient stay at the time of discharge.       REVIEW OF SYSTEMS  See above.  Dermatologic he has some redness and swelling of his right finger OF has been working his garage she think it may be getting infected.    PAST MEDICAL HISTORY  Past Medical History:   Diagnosis Date    Abnormal electrocardiogram (ECG) (EKG) 11/8/2021    KRISTAL (acute kidney injury) ([facility]) 11/8/2021    Chest pain in adult 11/8/2021    CKD (chronic kidney disease) stage 3, GFR 30-59 ml/min ([facility]) 11/7/2021    Diabetes ([facility])     Diabetic ketoacidosis without coma associated with type 2 diabetes mellitus ([facility]) 11/7/2021    Diarrhea 3/18/2022    DKA, type 1, not at goal ([facility]) 7/28/2022    Esophagitis 7/28/2022    [place] (hard of hearing)     Very [place] No hearing aides    Hypercholesteremia        FAMILY HISTORY  Family History   Problem Relation Age of Onset    Heart Disease Father        SOCIAL HISTORY  Social History     Tobacco Use    Smoking status: Former    Smokeless tobacco: Never   Substance Use Topics    Alcohol use: Not Currently    Drug use: Not Currently     Social History     Substance and Sexual Activity   Drug Use Not Currently       SURGICAL HISTORY  Past Surgical History:   Procedure Laterality Date    KS UPPER GI ENDOSCOPY,DIAGNOSIS N/A 3/14/2023    Procedure: GASTROSCOPY;  Surgeon: Atilio Freed M.D.;  Location: SURGERY [facility];  Service: Gastroenterology    OTHER      appy, lumbar fusion       CURRENT MEDICATIONS    Current Facility-Administered Medications:     NS infusion 1,000 mL, 1,000 mL, Intravenous, Once, Myke Melvin M.D.    ondansetron (ZOFRAN) syringe/vial injection 4 mg, 4 mg, Intravenous, Once, Myke Melvin M.D.    GI Cocktail (hyoscyamine-lidocaine-Maalox) oral susp cup 30 mL, 30 mL,  Oral, Once, Myke Melvin M.D.    Current Outpatient Medications:     sucralfate (CARAFATE) 1 GM/10ML Suspension, SHAKE LIQUID AND TAKE 10 ML BY MOUTH FOUR TIMES DAILY, Disp: 560 mL, Rfl: 0    omeprazole (PRILOSEC) 40 MG delayed-release capsule, TAKE 1 CAPSULE BY MOUTH EVERY DAY, Disp: 90 Capsule, Rfl: 3    CALCIUM PO, Take 1 Tablet by mouth every day., Disp: , Rfl:     VITAMIN D PO, Take 1 Tablet by mouth every day., Disp: , Rfl:     insulin 70/30 (HUMULIN 70/30) (70-30) 100 UNIT/ML Suspension, INJECT SUBCUTANEOUSLY 2 TO 28  UNITS TWICE DAILY PER SLIDING  SCALE (MAX OF 28 UNITS PER DAY) (Patient taking differently: Inject 2-28 Units under the skin 2 times a day. INJECT SUBCUTANEOUSLY 2 TO 28  UNITS TWICE DAILY PER SLIDING  SCALE (MAX OF 28 UNITS PER DAY)), Disp: 30 mL, Rfl: 3    Multiple Vitamin (ONE-A-DAY MENS) Tab, Take 1 Tablet by mouth every day., Disp: , Rfl:     ALLERGIES  No Known Allergies    PHYSICAL EXAM  VITAL SIGNS: BP 92/56   Temp 36.7 °C (98 °F) (Oral)   Resp (!) 10   Ht 1.829 m (6')   Wt 77.1 kg (170 lb)   SpO2 92%   BMI 23.06 kg/m²   Reviewed and noted.  Patient has some hypotension.  Constitutional: Well developed, Well nourished, tired appearing..  HENT: Normocephalic, atraumatic, bilateral external ears normal, No intraoral erythema, edema, exudate  Eyes: PERRLA, conjunctiva pink, no scleral icterus.   Cardiovascular: Regular rate and rhythm. No murmurs, rubs or gallops.  No dependent edema or calf tenderness  Respiratory: Lungs clear to auscultation bilaterally. No wheezes, rales, or rhonchi.  Abdominal:  Abdomen soft, minimal epigastric tenderness no rebound or guarding  Skin: No erythema, no rash. No wounds or bruising.  Genitourinary: No costovertebral angle tenderness.   Musculoskeletal: no deformities.   Neurologic: Alert, no facial droop noted. All extra ocular muscles intact. Moves all extremities with out weakness noed  Psychiatric: Affect normal, Judgment normal, Mood  normal.         MEDICAL DECISION MAKING:  PROBLEMS EVALUATED THIS VISIT:  Abdominal pain.  Epigastric has a history of esophagitis.  Likely that this is severe esophagitis unsure if the patient is taking his medications as directed.  Less likely but in differential could be cardiac etiology other abdominal issues pancreatitis hepatitis gallbladder disease among others.  Also urinary or even vascular issues are considered.  However, with his history and his symptoms.  Likely this is severe gastritis  Hypotension unknown etiology is a patient's sugar is he dehydrated.  There is possibly an infection and possible early sepsis.  We will go ahead and get lab work look his white cell count again antibiotics.  Get a lactate.         PLAN:  Medications   NS infusion 1,000 mL (has no administration in time range)   ondansetron (ZOFRAN) syringe/vial injection 4 mg (has no administration in time range)   GI Cocktail (hyoscyamine-lidocaine-Maalox) oral susp cup 30 mL (has no administration in time range)       RISK:  Patient with abdominal pain can have serious infection which could have by history of morbidity mortality even cardiac etiology.  However the patient's history and physical most likely this is exacerbation of his severe condition.    RESULTS    See below.  Elevated white cell count.  Patient has elevated glucose.  He has an anion gap as well.    LABS Ordered and Reviewed by Me:  Results for orders placed or performed during the hospital encounter of 03/11/23   CBC WITH DIFFERENTIAL   Result Value Ref Range    WBC 17.6 (H) 4.8 - 10.8 K/uL    RBC 5.36 4.70 - 6.10 M/uL    Hemoglobin 15.0 14.0 - 18.0 g/dL    Hematocrit 42.8 42.0 - 52.0 %    MCV 79.9 (L) 81.4 - 97.8 fL    MCH 28.0 27.0 - 33.0 pg    MCHC 35.0 33.7 - 35.3 g/dL    RDW 36.0 35.9 - 50.0 fL    Platelet Count 281 164 - 446 K/uL    MPV 9.1 9.0 - 12.9 fL    Neutrophils-Polys 86.90 (H) 44.00 - 72.00 %    Lymphocytes 6.40 (L) 22.00 - 41.00 %    Monocytes 6.00 0.00  - 13.40 %    Eosinophils 0.10 0.00 - 6.90 %    Basophils 0.10 0.00 - 1.80 %    Immature Granulocytes 0.50 0.00 - 0.90 %    Nucleated RBC 0.00 /100 WBC    Neutrophils (Absolute) 15.31 (H) 1.82 - 7.42 K/uL    Lymphs (Absolute) 1.12 1.00 - 4.80 K/uL    Monos (Absolute) 1.05 (H) 0.00 - 0.85 K/uL    Eos (Absolute) 0.01 0.00 - 0.51 K/uL    Baso (Absolute) 0.02 0.00 - 0.12 K/uL    Immature Granulocytes (abs) 0.09 0.00 - 0.11 K/uL    NRBC (Absolute) 0.00 K/uL   COMP METABOLIC PANEL   Result Value Ref Range    Sodium 138 135 - 145 mmol/L    Potassium 4.1 3.6 - 5.5 mmol/L    Chloride 91 (L) 96 - 112 mmol/L    Co2 28 20 - 33 mmol/L    Anion Gap 19.0 (H) 7.0 - 16.0    Glucose 358 (H) 65 - 99 mg/dL    Bun 39 (H) 8 - 22 mg/dL    Creatinine 0.84 0.50 - 1.40 mg/dL    Calcium 9.8 8.4 - 10.2 mg/dL    AST(SGOT) 14 12 - 45 U/L    ALT(SGPT) 24 2 - 50 U/L    Alkaline Phosphatase 104 (H) 30 - 99 U/L    Total Bilirubin 0.5 0.1 - 1.5 mg/dL    Albumin 4.1 3.2 - 4.9 g/dL    Total Protein 7.2 6.0 - 8.2 g/dL    Globulin 3.1 1.9 - 3.5 g/dL    A-G Ratio 1.3 g/dL   LIPASE   Result Value Ref Range    Lipase 9 7 - 58 U/L   URINALYSIS    Specimen: Urine   Result Value Ref Range    Color Yellow     Character Clear     Specific Gravity 1.010 <1.035    Ph 6.0 5.0 - 8.0    Glucose 500 (A) Negative mg/dL    Ketones 40 (A) Negative mg/dL    Protein Negative Negative mg/dL    Bilirubin Small (A) Negative    Nitrite Negative Negative    Leukocyte Esterase Negative Negative    Occult Blood Negative Negative    Micro Urine Req see below    VENOUS BLOOD GAS   Result Value Ref Range    Venous Bg Ph 7.49 (H) 7.31 - 7.45    Venous Bg Pco2 39.0 (L) 41.0 - 51.0 mmHg    Venous Bg Po2 55.0 (H) 25.0 - 40.0 mmHg    Venous Bg O2 Saturation 89.5 %    Venous Bg Hco3 29 (H) 24 - 28 mmol/L    Venous Bg Base Excess 5 mmol/L    Body Temp 36.4 Centigrade   BETA-HYDROXYBUTYRIC ACID   Result Value Ref Range    beta-Hydroxybutyric Acid 4.50 (H) 0.02 - 0.27 mmol/L   CORRECTED  CALCIUM   Result Value Ref Range    Correct Calcium 9.7 8.5 - 10.5 mg/dL   ESTIMATED GFR   Result Value Ref Range    GFR (CKD-EPI) 101 >60 mL/min/1.73 m 2   Magnesium   Result Value Ref Range    Magnesium 2.0 1.5 - 2.5 mg/dL   PROCALCITONIN   Result Value Ref Range    Procalcitonin 0.10 <0.25 ng/mL   LACTIC ACID   Result Value Ref Range    Lactic Acid 1.0 0.5 - 2.0 mmol/L   LACTIC ACID   Result Value Ref Range    Lactic Acid 1.2 0.5 - 2.0 mmol/L   TROPONIN   Result Value Ref Range    Troponin T 26 (H) 6 - 19 ng/L   LACTIC ACID   Result Value Ref Range    Lactic Acid 1.0 0.5 - 2.0 mmol/L   Basic Metabolic Panel   Result Value Ref Range    Sodium 139 135 - 145 mmol/L    Potassium 3.9 3.6 - 5.5 mmol/L    Chloride 97 96 - 112 mmol/L    Co2 28 20 - 33 mmol/L    Glucose 271 (H) 65 - 99 mg/dL    Bun 35 (H) 8 - 22 mg/dL    Creatinine 0.84 0.50 - 1.40 mg/dL    Calcium 8.5 8.4 - 10.2 mg/dL    Anion Gap 14.0 7.0 - 16.0   CBC WITH DIFFERENTIAL   Result Value Ref Range    WBC 13.1 (H) 4.8 - 10.8 K/uL    RBC 4.99 4.70 - 6.10 M/uL    Hemoglobin 13.8 (L) 14.0 - 18.0 g/dL    Hematocrit 41.7 (L) 42.0 - 52.0 %    MCV 83.6 81.4 - 97.8 fL    MCH 27.7 27.0 - 33.0 pg    MCHC 33.1 (L) 33.7 - 35.3 g/dL    RDW 40.0 35.9 - 50.0 fL    Platelet Count 261 164 - 446 K/uL    MPV 9.3 9.0 - 12.9 fL    Neutrophils-Polys 86.90 (H) 44.00 - 72.00 %    Lymphocytes 6.50 (L) 22.00 - 41.00 %    Monocytes 5.70 0.00 - 13.40 %    Eosinophils 0.00 0.00 - 6.90 %    Basophils 0.10 0.00 - 1.80 %    Immature Granulocytes 0.80 0.00 - 0.90 %    Nucleated RBC 0.00 /100 WBC    Neutrophils (Absolute) 11.35 (H) 1.82 - 7.42 K/uL    Lymphs (Absolute) 0.85 (L) 1.00 - 4.80 K/uL    Monos (Absolute) 0.74 0.00 - 0.85 K/uL    Eos (Absolute) 0.00 0.00 - 0.51 K/uL    Baso (Absolute) 0.01 0.00 - 0.12 K/uL    Immature Granulocytes (abs) 0.10 0.00 - 0.11 K/uL    NRBC (Absolute) 0.00 K/uL   HEMOGLOBIN A1C   Result Value Ref Range    Glycohemoglobin 15.2 (H) 4.0 - 5.6 %    Est Avg  Glucose 390 mg/dL   LACTIC ACID   Result Value Ref Range    Lactic Acid 0.8 0.5 - 2.0 mmol/L   ESTIMATED GFR   Result Value Ref Range    GFR (CKD-EPI) 101 >60 mL/min/1.73 m 2   BLOOD CULTURE    Specimen: Peripheral; Blood   Result Value Ref Range    Significant Indicator NEG     Source BLD     Site PERIPHERAL     Culture Result       No growth after 5 days of incubation.  Blood culture testing and Gram stain, if indicated, are  performed at [facility], [address] [place] [address].  Positive blood cultures are  sent to [facility], [address] [place] [address], for organism identification and  susceptibility testing.     BLOOD CULTURE    Specimen: Peripheral; Blood   Result Value Ref Range    Significant Indicator NEG     Source BLD     Site PERIPHERAL     Culture Result       No growth after 5 days of incubation.  Blood culture testing and Gram stain, if indicated, are  performed at [facility], [address] [place] [address].  Positive blood cultures are  sent to [facility], [address] [place] [address], for organism identification and  susceptibility testing.     CBC WITH DIFFERENTIAL   Result Value Ref Range    WBC 10.9 (H) 4.8 - 10.8 K/uL    RBC 4.61 (L) 4.70 - 6.10 M/uL    Hemoglobin 12.7 (L) 14.0 - 18.0 g/dL    Hematocrit 39.4 (L) 42.0 - 52.0 %    MCV 85.5 81.4 - 97.8 fL    MCH 27.5 27.0 - 33.0 pg    MCHC 32.2 (L) 33.7 - 35.3 g/dL    RDW 40.6 35.9 - 50.0 fL    Platelet Count 215 164 - 446 K/uL    MPV 9.4 9.0 - 12.9 fL    Neutrophils-Polys 82.30 (H) 44.00 - 72.00 %    Lymphocytes 9.90 (L) 22.00 - 41.00 %    Monocytes 7.00 0.00 - 13.40 %    Eosinophils 0.20 0.00 - 6.90 %    Basophils 0.10 0.00 - 1.80 %    Immature Granulocytes 0.50 0.00 - 0.90 %    Nucleated RBC 0.00 /100 WBC    Neutrophils (Absolute) 9.01 (H) 1.82 - 7.42 K/uL    Lymphs (Absolute) 1.08 1.00 - 4.80 K/uL    Monos (Absolute) 0.76 0.00 - 0.85  K/uL    Eos (Absolute) 0.02 0.00 - 0.51 K/uL    Baso (Absolute) 0.01 0.00 - 0.12 K/uL    Immature Granulocytes (abs) 0.05 0.00 - 0.11 K/uL    NRBC (Absolute) 0.00 K/uL   Basic Metabolic Panel   Result Value Ref Range    Sodium 138 135 - 145 mmol/L    Potassium 3.9 3.6 - 5.5 mmol/L    Chloride 100 96 - 112 mmol/L    Co2 25 20 - 33 mmol/L    Glucose 218 (H) 65 - 99 mg/dL    Bun 21 8 - 22 mg/dL    Creatinine 0.72 0.50 - 1.40 mg/dL    Calcium 7.8 (L) 8.4 - 10.2 mg/dL    Anion Gap 13.0 7.0 - 16.0   MAGNESIUM   Result Value Ref Range    Magnesium 1.9 1.5 - 2.5 mg/dL   ESTIMATED GFR   Result Value Ref Range    GFR (CKD-EPI) 105 >60 mL/min/1.73 m 2   CBC WITH DIFFERENTIAL   Result Value Ref Range    WBC 7.9 4.8 - 10.8 K/uL    RBC 4.07 (L) 4.70 - 6.10 M/uL    Hemoglobin 11.3 (L) 14.0 - 18.0 g/dL    Hematocrit 34.8 (L) 42.0 - 52.0 %    MCV 85.5 81.4 - 97.8 fL    MCH 27.8 27.0 - 33.0 pg    MCHC 32.5 (L) 33.7 - 35.3 g/dL    RDW 39.2 35.9 - 50.0 fL    Platelet Count 175 164 - 446 K/uL    MPV 9.6 9.0 - 12.9 fL    Neutrophils-Polys 68.60 44.00 - 72.00 %    Lymphocytes 20.70 (L) 22.00 - 41.00 %    Monocytes 8.70 0.00 - 13.40 %    Eosinophils 1.40 0.00 - 6.90 %    Basophils 0.10 0.00 - 1.80 %    Immature Granulocytes 0.50 0.00 - 0.90 %    Nucleated RBC 0.00 /100 WBC    Neutrophils (Absolute) 5.41 1.82 - 7.42 K/uL    Lymphs (Absolute) 1.63 1.00 - 4.80 K/uL    Monos (Absolute) 0.69 0.00 - 0.85 K/uL    Eos (Absolute) 0.11 0.00 - 0.51 K/uL    Baso (Absolute) 0.01 0.00 - 0.12 K/uL    Immature Granulocytes (abs) 0.04 0.00 - 0.11 K/uL    NRBC (Absolute) 0.00 K/uL   Basic Metabolic Panel   Result Value Ref Range    Sodium 138 135 - 145 mmol/L    Potassium 3.5 (L) 3.6 - 5.5 mmol/L    Chloride 103 96 - 112 mmol/L    Co2 27 20 - 33 mmol/L    Glucose 152 (H) 65 - 99 mg/dL    Bun 12 8 - 22 mg/dL    Creatinine 0.52 0.50 - 1.40 mg/dL    Calcium 7.4 (L) 8.4 - 10.2 mg/dL    Anion Gap 8.0 7.0 - 16.0   MAGNESIUM   Result Value Ref Range    Magnesium  1.8 1.5 - 2.5 mg/dL   ESTIMATED GFR   Result Value Ref Range    GFR (CKD-EPI) 116 >60 mL/min/1.73 m 2   MAGNESIUM   Result Value Ref Range    Magnesium 1.7 1.5 - 2.5 mg/dL   Basic Metabolic Panel   Result Value Ref Range    Sodium 132 (L) 135 - 145 mmol/L    Potassium 3.2 (L) 3.6 - 5.5 mmol/L    Chloride 100 96 - 112 mmol/L    Co2 27 20 - 33 mmol/L    Glucose 160 (H) 65 - 99 mg/dL    Bun 9 8 - 22 mg/dL    Creatinine 0.50 0.50 - 1.40 mg/dL    Calcium 7.3 (L) 8.4 - 10.2 mg/dL    Anion Gap 5.0 (L) 7.0 - 16.0   ESTIMATED GFR   Result Value Ref Range    GFR (CKD-EPI) 118 >60 mL/min/1.73 m 2   EKG   Result Value Ref Range    Report       [facility]    Test Date:  2023  Pt Name:    BAILEE JACOBSON               [place] [facility]  MRN:        [id number]                      Room:       [facility]  Gender:     Male                         Technician: [id number]  :        1965                   Requested By:ARMIDA CASANOVA  Order #:    [id number]                    Reading MD: Oscar Rush MD    Measurements  Intervals                                Axis  Rate:       88                           P:          75  OK:         173                          QRS:        66  QRSD:       92                           T:          64  QT:         382  QTc:        463    Interpretive Statements  Sinus rhythm  Borderline low voltage, extremity leads  Compared to ECG 2022 12:43:39  T-wave abnormality no longer present  Electronically Signed On 3- 14:24:45 PDT by Oscar Rush MD     POCT glucose device results   Result Value Ref Range    POC Glucose, Blood 310 (H) 65 - 99 mg/dL   POCT glucose device results   Result Value Ref Range    POC Glucose, Blood 257 (H) 65 - 99 mg/dL   POCT glucose device results   Result Value Ref Range    POC Glucose, Blood 253 (H) 65 - 99 mg/dL   POCT glucose device results   Result Value Ref Range    POC Glucose, Blood 211 (H) 65 - 99 mg/dL   POCT glucose device results   Result Value Ref  Range    POC Glucose, Blood 216 (H) 65 - 99 mg/dL   POCT glucose device results   Result Value Ref Range    POC Glucose, Blood 217 (H) 65 - 99 mg/dL   POCT glucose device results   Result Value Ref Range    POC Glucose, Blood 211 (H) 65 - 99 mg/dL   POCT glucose device results   Result Value Ref Range    POC Glucose, Blood 203 (H) 65 - 99 mg/dL   POCT glucose device results   Result Value Ref Range    POC Glucose, Blood 224 (H) 65 - 99 mg/dL   POCT glucose device results   Result Value Ref Range    POC Glucose, Blood 186 (H) 65 - 99 mg/dL   POCT glucose device results   Result Value Ref Range    POC Glucose, Blood 183 (H) 65 - 99 mg/dL   POCT glucose device results   Result Value Ref Range    POC Glucose, Blood 101 (H) 65 - 99 mg/dL   POCT glucose device results   Result Value Ref Range    POC Glucose, Blood 96 65 - 99 mg/dL   POCT glucose device results   Result Value Ref Range    POC Glucose, Blood 88 65 - 99 mg/dL   POCT glucose device results   Result Value Ref Range    POC Glucose, Blood 183 (H) 65 - 99 mg/dL   POCT glucose device results   Result Value Ref Range    POC Glucose, Blood 153 (H) 65 - 99 mg/dL   POCT glucose device results   Result Value Ref Range    POC Glucose, Blood 122 (H) 65 - 99 mg/dL   POCT glucose device results   Result Value Ref Range    POC Glucose, Blood 106 (H) 65 - 99 mg/dL   POCT glucose device results   Result Value Ref Range    POC Glucose, Blood 114 (H) 65 - 99 mg/dL   POCT glucose device results   Result Value Ref Range    POC Glucose, Blood 86 65 - 99 mg/dL   POCT glucose device results   Result Value Ref Range    POC Glucose, Blood 123 (H) 65 - 99 mg/dL       Rhythm Strip: Interpretation by me this rhythm rate of 96.    EKG Interpretation by me see labs section  Indication: Epigastric pain  Rate 96  Rhythm: Sinus  Axis: Normal  ST segment elevation depressions: None noted in anterior inferior lateral leads.  Borderline possible in lead V4 V5.  Abnormal no acute overt ischemic  findings        ED COURSE:    ED Observation Status? Yes; Patient placed in observation status at 1:35 PM 06/08/23     Observation plan is as follows:   Pain medication  Fluids  GI medications      INTERVENTIONS BY ME:  Medications   NS infusion 1,000 mL (0 mL Intravenous Stopped 6/8/23 1457)   ondansetron (ZOFRAN) syringe/vial injection 4 mg (4 mg Intravenous Given 6/8/23 1347)   GI Cocktail (hyoscyamine-lidocaine-Maalox) oral susp cup 30 mL (30 mL Oral Given 6/8/23 1346)   ceFAZolin (Ancef) 2 g in  mL IVPB (0 g Intravenous Stopped 6/8/23 1452)   famotidine (PEPCID) tablet 20 mg (20 mg Oral Given 6/8/23 1559)   omeprazole (PRILOSEC) capsule 20 mg (20 mg Oral Given 6/8/23 1559)       Response on recheck: Continue to have repeat evaluations continue not have an significant pain improvement..    This point we will admit the patient.  There is been no noted barriers to care  The patient has a GI appointment in 1 week.        FINAL DISPO PLAN   Admit.    Appears to have symptoms cellulitis on his right finger.  Elevated white cell count.  Elevated glucose of significant diabetes out-of-control dehydration GI pain.  Most likely significant gastritis.      CONDITION: Guarded..     FINAL IMPRESSION  1. Pain of upper abdomen    2. Cellulitis of finger of right hand

## 2023-06-08 NOTE — ED NOTES
Attempted to discharge pt and pt is refusing to go stating that he just does not feel good and he still had pain and we are ignoring his problem. Attempted to educate pt on discharge medications but pt refusing to leave at this time. ERP updated and is speaking with pt at this time.

## 2023-06-08 NOTE — ED TRIAGE NOTES
"Chief Complaint   Patient presents with    Epigastric Pain     Pt reports pain above his belly button that has been there for a a \"few days\", pt has history of abd pain with GI follow up to happen 6/15  Pt states that over the last few days he has had three episodes of vomiting   Pt was initially hypotensive with pressure 70/30 upon EMS arrival post 1L IV fluids improved to 98/66 and 5 mg of morphine was given. Pt than became hypoxic requiring 3L NC. ERP called to bedside due to pt presentation        BP 92/56   Temp 36.7 °C (98 °F) (Oral)   Resp (!) 10   Ht 1.829 m (6')   Wt 77.1 kg (170 lb)   SpO2 92%   BMI 23.06 kg/m²     "

## 2023-06-08 NOTE — DISCHARGE INSTRUCTIONS
Discharge Instructions    Discharged to home by car with escort. Discharged via wheelchair, hospital escort: Yes.  Special equipment needed: Not Applicable    Be sure to schedule a follow-up appointment with your primary care doctor or any specialists as instructed.     Discharge Plan:   Diet Plan: Discussed  Activity Level: Discussed  Confirmed Follow up Appointment: Patient to Call and Schedule Appointment  Confirmed Symptoms Management: Discussed  Medication Reconciliation Updated: Yes    I understand that a diet low in cholesterol, fat, and sodium is recommended for good health. Unless I have been given specific instructions below for another diet, I accept this instruction as my diet prescription.   Other diet: as toelrated    Special Instructions: None    -Is this patient being discharged with medication to prevent blood clots?  No    Is patient discharged on Warfarin / Coumadin?   No     Come back if the finger is not better 1 to 2 days.  You may need to be admitted.    Prilosec 1 tablet twice a day  Make sure you dissolve the Carafate into 3 ounces of water and then drink it.    Follow-up with your GI doctor next week as planned

## 2023-06-09 ENCOUNTER — APPOINTMENT (OUTPATIENT)
Dept: CARDIOLOGY | Facility: MEDICAL CENTER | Age: 58
DRG: 628 | End: 2023-06-09
Attending: HOSPITALIST
Payer: COMMERCIAL

## 2023-06-09 ENCOUNTER — APPOINTMENT (OUTPATIENT)
Dept: RADIOLOGY | Facility: MEDICAL CENTER | Age: 58
DRG: 628 | End: 2023-06-09
Attending: HOSPITALIST
Payer: COMMERCIAL

## 2023-06-09 PROBLEM — G93.41 ACUTE METABOLIC ENCEPHALOPATHY: Status: ACTIVE | Noted: 2023-06-09

## 2023-06-09 PROBLEM — R55 SYNCOPE AND COLLAPSE: Status: ACTIVE | Noted: 2023-06-09

## 2023-06-09 LAB
ALBUMIN SERPL BCP-MCNC: 3.3 G/DL (ref 3.2–4.9)
ALBUMIN/GLOB SERPL: 1 G/DL
ALP SERPL-CCNC: 92 U/L (ref 30–99)
ALT SERPL-CCNC: 11 U/L (ref 2–50)
AMPHET UR QL SCN: POSITIVE
ANION GAP SERPL CALC-SCNC: 12 MMOL/L (ref 7–16)
ANION GAP SERPL CALC-SCNC: 27 MMOL/L (ref 7–16)
ANION GAP SERPL CALC-SCNC: 7 MMOL/L (ref 7–16)
ANION GAP SERPL CALC-SCNC: 7 MMOL/L (ref 7–16)
AST SERPL-CCNC: 9 U/L (ref 12–45)
B-OH-BUTYR SERPL-MCNC: 6.6 MMOL/L (ref 0.02–0.27)
BARBITURATES UR QL SCN: NEGATIVE
BASE EXCESS BLDA CALC-SCNC: -2 MMOL/L (ref -4–3)
BASE EXCESS BLDA CALC-SCNC: 3 MMOL/L (ref -4–3)
BASOPHILS # BLD AUTO: 0.1 % (ref 0–1.8)
BASOPHILS # BLD: 0.01 K/UL (ref 0–0.12)
BENZODIAZ UR QL SCN: NEGATIVE
BILIRUB SERPL-MCNC: 0.3 MG/DL (ref 0.1–1.5)
BODY TEMPERATURE: ABNORMAL DEGREES
BODY TEMPERATURE: ABNORMAL DEGREES
BUN SERPL-MCNC: 19 MG/DL (ref 8–22)
BUN SERPL-MCNC: 22 MG/DL (ref 8–22)
BUN SERPL-MCNC: 24 MG/DL (ref 8–22)
BUN SERPL-MCNC: 29 MG/DL (ref 8–22)
BZE UR QL SCN: NEGATIVE
CALCIUM ALBUM COR SERPL-MCNC: 9.2 MG/DL (ref 8.5–10.5)
CALCIUM SERPL-MCNC: 7.7 MG/DL (ref 8.4–10.2)
CALCIUM SERPL-MCNC: 7.8 MG/DL (ref 8.4–10.2)
CALCIUM SERPL-MCNC: 8 MG/DL (ref 8.4–10.2)
CALCIUM SERPL-MCNC: 8.6 MG/DL (ref 8.4–10.2)
CANNABINOIDS UR QL SCN: NEGATIVE
CHLORIDE SERPL-SCNC: 102 MMOL/L (ref 96–112)
CHLORIDE SERPL-SCNC: 103 MMOL/L (ref 96–112)
CHLORIDE SERPL-SCNC: 104 MMOL/L (ref 96–112)
CHLORIDE SERPL-SCNC: 96 MMOL/L (ref 96–112)
CO2 BLDA-SCNC: 24 MMOL/L (ref 20–33)
CO2 BLDA-SCNC: 25 MMOL/L (ref 20–33)
CO2 SERPL-SCNC: 17 MMOL/L (ref 20–33)
CO2 SERPL-SCNC: 23 MMOL/L (ref 20–33)
CO2 SERPL-SCNC: 26 MMOL/L (ref 20–33)
CO2 SERPL-SCNC: 27 MMOL/L (ref 20–33)
CREAT SERPL-MCNC: 0.6 MG/DL (ref 0.5–1.4)
CREAT SERPL-MCNC: 0.65 MG/DL (ref 0.5–1.4)
CREAT SERPL-MCNC: 0.71 MG/DL (ref 0.5–1.4)
CREAT SERPL-MCNC: 0.97 MG/DL (ref 0.5–1.4)
DELSYS IDSYS: ABNORMAL
DELSYS IDSYS: ABNORMAL
EKG IMPRESSION: NORMAL
EOSINOPHIL # BLD AUTO: 0 K/UL (ref 0–0.51)
EOSINOPHIL NFR BLD: 0 % (ref 0–6.9)
ERYTHROCYTE [DISTWIDTH] IN BLOOD BY AUTOMATED COUNT: 39.2 FL (ref 35.9–50)
ERYTHROCYTE [DISTWIDTH] IN BLOOD BY AUTOMATED COUNT: 39.6 FL (ref 35.9–50)
GFR SERPLBLD CREATININE-BSD FMLA CKD-EPI: 106 ML/MIN/1.73 M 2
GFR SERPLBLD CREATININE-BSD FMLA CKD-EPI: 109 ML/MIN/1.73 M 2
GFR SERPLBLD CREATININE-BSD FMLA CKD-EPI: 111 ML/MIN/1.73 M 2
GFR SERPLBLD CREATININE-BSD FMLA CKD-EPI: 90 ML/MIN/1.73 M 2
GLOBULIN SER CALC-MCNC: 3.3 G/DL (ref 1.9–3.5)
GLUCOSE BLD STRIP.AUTO-MCNC: 134 MG/DL (ref 65–99)
GLUCOSE BLD STRIP.AUTO-MCNC: 142 MG/DL (ref 65–99)
GLUCOSE BLD STRIP.AUTO-MCNC: 142 MG/DL (ref 65–99)
GLUCOSE BLD STRIP.AUTO-MCNC: 161 MG/DL (ref 65–99)
GLUCOSE BLD STRIP.AUTO-MCNC: 164 MG/DL (ref 65–99)
GLUCOSE BLD STRIP.AUTO-MCNC: 190 MG/DL (ref 65–99)
GLUCOSE BLD STRIP.AUTO-MCNC: 359 MG/DL (ref 65–99)
GLUCOSE SERPL-MCNC: 170 MG/DL (ref 65–99)
GLUCOSE SERPL-MCNC: 239 MG/DL (ref 65–99)
GLUCOSE SERPL-MCNC: 284 MG/DL (ref 65–99)
GLUCOSE SERPL-MCNC: 335 MG/DL (ref 65–99)
HCO3 BLDA-SCNC: 23 MMOL/L (ref 17–25)
HCO3 BLDA-SCNC: 23.4 MMOL/L (ref 17–25)
HCT VFR BLD AUTO: 37.7 % (ref 42–52)
HCT VFR BLD AUTO: 42.3 % (ref 42–52)
HGB BLD-MCNC: 12.4 G/DL (ref 14–18)
HGB BLD-MCNC: 13.9 G/DL (ref 14–18)
IMM GRANULOCYTES # BLD AUTO: 0.1 K/UL (ref 0–0.11)
IMM GRANULOCYTES NFR BLD AUTO: 0.8 % (ref 0–0.9)
LACTATE BLD-SCNC: 0.7 MMOL/L (ref 0.5–2)
LACTATE BLD-SCNC: 0.9 MMOL/L (ref 0.5–2)
LACTATE BLD-SCNC: 1 MMOL/L (ref 0.5–2)
LACTATE SERPL-SCNC: 1.2 MMOL/L (ref 0.5–2)
LACTATE SERPL-SCNC: 3.2 MMOL/L (ref 0.5–2)
LPM ILPM: 3 LPM
LPM ILPM: 4 LPM
LV EJECT FRACT  99904: 65
LV EJECT FRACT MOD 2C 99903: 68
LV EJECT FRACT MOD 4C 99902: 65.16
LV EJECT FRACT MOD BP 99901: 68.07
LYMPHOCYTES # BLD AUTO: 0.64 K/UL (ref 1–4.8)
LYMPHOCYTES NFR BLD: 5 % (ref 22–41)
MAGNESIUM SERPL-MCNC: 1.9 MG/DL (ref 1.5–2.5)
MAGNESIUM SERPL-MCNC: 2 MG/DL (ref 1.5–2.5)
MCH RBC QN AUTO: 28.3 PG (ref 27–33)
MCH RBC QN AUTO: 28.5 PG (ref 27–33)
MCHC RBC AUTO-ENTMCNC: 32.9 G/DL (ref 32.3–36.5)
MCHC RBC AUTO-ENTMCNC: 32.9 G/DL (ref 32.3–36.5)
MCV RBC AUTO: 86 FL (ref 81.4–97.8)
MCV RBC AUTO: 86.7 FL (ref 81.4–97.8)
METHADONE UR QL SCN: NEGATIVE
MONOCYTES # BLD AUTO: 0.43 K/UL (ref 0–0.85)
MONOCYTES NFR BLD AUTO: 3.4 % (ref 0–13.4)
NEUTROPHILS # BLD AUTO: 11.63 K/UL (ref 1.82–7.42)
NEUTROPHILS NFR BLD: 90.7 % (ref 44–72)
NRBC # BLD AUTO: 0 K/UL
NRBC BLD-RTO: 0 /100 WBC (ref 0–0.2)
NT-PROBNP SERPL IA-MCNC: 105 PG/ML (ref 0–125)
OPIATES UR QL SCN: POSITIVE
OXYCODONE UR QL SCN: POSITIVE
PCO2 BLDA: 23 MMHG (ref 26–37)
PCO2 BLDA: 40.1 MMHG (ref 26–37)
PCP UR QL SCN: NEGATIVE
PH BLDA: 7.38 [PH] (ref 7.4–7.5)
PH BLDA: 7.61 [PH] (ref 7.4–7.5)
PH TEMP ADJ BLDA: 7.38 [PH] (ref 7.4–7.5)
PHOSPHATE SERPL-MCNC: 1.9 MG/DL (ref 2.5–4.5)
PHOSPHATE SERPL-MCNC: 2.9 MG/DL (ref 2.5–4.5)
PHOSPHATE SERPL-MCNC: 3.6 MG/DL (ref 2.5–4.5)
PHOSPHATE SERPL-MCNC: 3.7 MG/DL (ref 2.5–4.5)
PLATELET # BLD AUTO: 255 K/UL (ref 164–446)
PLATELET # BLD AUTO: 308 K/UL (ref 164–446)
PMV BLD AUTO: 9.2 FL (ref 9–12.9)
PMV BLD AUTO: 9.3 FL (ref 9–12.9)
PO2 BLDA: 104 MMHG (ref 64–87)
PO2 BLDA: 75 MMHG (ref 64–87)
PO2 TEMP ADJ BLDA: 102 MMHG (ref 64–87)
POTASSIUM SERPL-SCNC: 3.9 MMOL/L (ref 3.6–5.5)
POTASSIUM SERPL-SCNC: 4.3 MMOL/L (ref 3.6–5.5)
POTASSIUM SERPL-SCNC: 4.4 MMOL/L (ref 3.6–5.5)
POTASSIUM SERPL-SCNC: 4.4 MMOL/L (ref 3.6–5.5)
PROPOXYPH UR QL SCN: NEGATIVE
PROT SERPL-MCNC: 6.6 G/DL (ref 6–8.2)
RBC # BLD AUTO: 4.35 M/UL (ref 4.7–6.1)
RBC # BLD AUTO: 4.92 M/UL (ref 4.7–6.1)
SAO2 % BLDA: 97 % (ref 93–99)
SAO2 % BLDA: 98 % (ref 93–99)
SODIUM SERPL-SCNC: 136 MMOL/L (ref 135–145)
SODIUM SERPL-SCNC: 136 MMOL/L (ref 135–145)
SODIUM SERPL-SCNC: 139 MMOL/L (ref 135–145)
SODIUM SERPL-SCNC: 140 MMOL/L (ref 135–145)
SPECIMEN DRAWN FROM PATIENT: ABNORMAL
SPECIMEN DRAWN FROM PATIENT: ABNORMAL
TROPONIN T SERPL-MCNC: 24 NG/L (ref 6–19)
TROPONIN T SERPL-MCNC: 28 NG/L (ref 6–19)
TROPONIN T SERPL-MCNC: 29 NG/L (ref 6–19)
TROPONIN T SERPL-MCNC: 30 NG/L (ref 6–19)
TROPONIN T SERPL-MCNC: 31 NG/L (ref 6–19)
WBC # BLD AUTO: 12.8 K/UL (ref 4.8–10.8)
WBC # BLD AUTO: 16.1 K/UL (ref 4.8–10.8)

## 2023-06-09 PROCEDURE — 36600 WITHDRAWAL OF ARTERIAL BLOOD: CPT

## 2023-06-09 PROCEDURE — 36415 COLL VENOUS BLD VENIPUNCTURE: CPT

## 2023-06-09 PROCEDURE — 82010 KETONE BODYS QUAN: CPT

## 2023-06-09 PROCEDURE — 700102 HCHG RX REV CODE 250 W/ 637 OVERRIDE(OP): Performed by: STUDENT IN AN ORGANIZED HEALTH CARE EDUCATION/TRAINING PROGRAM

## 2023-06-09 PROCEDURE — 85027 COMPLETE CBC AUTOMATED: CPT

## 2023-06-09 PROCEDURE — 700101 HCHG RX REV CODE 250: Performed by: HOSPITALIST

## 2023-06-09 PROCEDURE — 82803 BLOOD GASES ANY COMBINATION: CPT

## 2023-06-09 PROCEDURE — 700105 HCHG RX REV CODE 258: Performed by: STUDENT IN AN ORGANIZED HEALTH CARE EDUCATION/TRAINING PROGRAM

## 2023-06-09 PROCEDURE — 700102 HCHG RX REV CODE 250 W/ 637 OVERRIDE(OP): Performed by: HOSPITALIST

## 2023-06-09 PROCEDURE — 700102 HCHG RX REV CODE 250 W/ 637 OVERRIDE(OP)

## 2023-06-09 PROCEDURE — 83605 ASSAY OF LACTIC ACID: CPT

## 2023-06-09 PROCEDURE — 770020 HCHG ROOM/CARE - TELE (206)

## 2023-06-09 PROCEDURE — 87040 BLOOD CULTURE FOR BACTERIA: CPT | Mod: 91

## 2023-06-09 PROCEDURE — 51798 US URINE CAPACITY MEASURE: CPT

## 2023-06-09 PROCEDURE — 700105 HCHG RX REV CODE 258: Performed by: HOSPITALIST

## 2023-06-09 PROCEDURE — 84100 ASSAY OF PHOSPHORUS: CPT

## 2023-06-09 PROCEDURE — 93005 ELECTROCARDIOGRAM TRACING: CPT

## 2023-06-09 PROCEDURE — 700111 HCHG RX REV CODE 636 W/ 250 OVERRIDE (IP): Performed by: HOSPITALIST

## 2023-06-09 PROCEDURE — 93005 ELECTROCARDIOGRAM TRACING: CPT | Performed by: HOSPITALIST

## 2023-06-09 PROCEDURE — 93306 TTE W/DOPPLER COMPLETE: CPT

## 2023-06-09 PROCEDURE — 80307 DRUG TEST PRSMV CHEM ANLYZR: CPT

## 2023-06-09 PROCEDURE — 99233 SBSQ HOSP IP/OBS HIGH 50: CPT | Performed by: STUDENT IN AN ORGANIZED HEALTH CARE EDUCATION/TRAINING PROGRAM

## 2023-06-09 PROCEDURE — 84484 ASSAY OF TROPONIN QUANT: CPT | Mod: 91

## 2023-06-09 PROCEDURE — 83735 ASSAY OF MAGNESIUM: CPT

## 2023-06-09 PROCEDURE — 82962 GLUCOSE BLOOD TEST: CPT | Mod: 91

## 2023-06-09 PROCEDURE — 93010 ELECTROCARDIOGRAM REPORT: CPT | Performed by: INTERNAL MEDICINE

## 2023-06-09 PROCEDURE — 93306 TTE W/DOPPLER COMPLETE: CPT | Mod: 26 | Performed by: INTERNAL MEDICINE

## 2023-06-09 PROCEDURE — 80053 COMPREHEN METABOLIC PANEL: CPT

## 2023-06-09 PROCEDURE — A9270 NON-COVERED ITEM OR SERVICE: HCPCS

## 2023-06-09 PROCEDURE — A9270 NON-COVERED ITEM OR SERVICE: HCPCS | Performed by: HOSPITALIST

## 2023-06-09 PROCEDURE — 83880 ASSAY OF NATRIURETIC PEPTIDE: CPT

## 2023-06-09 PROCEDURE — 71045 X-RAY EXAM CHEST 1 VIEW: CPT

## 2023-06-09 PROCEDURE — 85025 COMPLETE CBC W/AUTO DIFF WBC: CPT

## 2023-06-09 PROCEDURE — 99291 CRITICAL CARE FIRST HOUR: CPT | Performed by: HOSPITALIST

## 2023-06-09 PROCEDURE — 94660 CPAP INITIATION&MGMT: CPT

## 2023-06-09 PROCEDURE — A9270 NON-COVERED ITEM OR SERVICE: HCPCS | Performed by: STUDENT IN AN ORGANIZED HEALTH CARE EDUCATION/TRAINING PROGRAM

## 2023-06-09 PROCEDURE — 94760 N-INVAS EAR/PLS OXIMETRY 1: CPT

## 2023-06-09 PROCEDURE — 80048 BASIC METABOLIC PNL TOTAL CA: CPT

## 2023-06-09 RX ORDER — POTASSIUM CHLORIDE 7.45 MG/ML
10 INJECTION INTRAVENOUS
Status: COMPLETED | OUTPATIENT
Start: 2023-06-09 | End: 2023-06-09

## 2023-06-09 RX ORDER — HYDROCODONE BITARTRATE AND ACETAMINOPHEN 5; 325 MG/1; MG/1
1 TABLET ORAL ONCE
Status: COMPLETED | OUTPATIENT
Start: 2023-06-09 | End: 2023-06-09

## 2023-06-09 RX ORDER — METRONIDAZOLE 500 MG/100ML
500 INJECTION, SOLUTION INTRAVENOUS EVERY 12 HOURS
Status: DISCONTINUED | OUTPATIENT
Start: 2023-06-09 | End: 2023-06-09

## 2023-06-09 RX ORDER — INSULIN LISPRO 100 [IU]/ML
3-14 INJECTION, SOLUTION INTRAVENOUS; SUBCUTANEOUS
Status: DISCONTINUED | OUTPATIENT
Start: 2023-06-09 | End: 2023-06-16

## 2023-06-09 RX ORDER — HYDROCODONE BITARTRATE AND ACETAMINOPHEN 5; 325 MG/1; MG/1
TABLET ORAL
Status: COMPLETED
Start: 2023-06-09 | End: 2023-06-09

## 2023-06-09 RX ORDER — SODIUM CHLORIDE, SODIUM LACTATE, POTASSIUM CHLORIDE, CALCIUM CHLORIDE 600; 310; 30; 20 MG/100ML; MG/100ML; MG/100ML; MG/100ML
INJECTION, SOLUTION INTRAVENOUS CONTINUOUS
Status: DISCONTINUED | OUTPATIENT
Start: 2023-06-09 | End: 2023-06-14

## 2023-06-09 RX ORDER — POTASSIUM CHLORIDE 7.45 MG/ML
10 INJECTION INTRAVENOUS ONCE
Status: COMPLETED | OUTPATIENT
Start: 2023-06-09 | End: 2023-06-09

## 2023-06-09 RX ORDER — MAGNESIUM SULFATE HEPTAHYDRATE 40 MG/ML
4 INJECTION, SOLUTION INTRAVENOUS
Status: COMPLETED | OUTPATIENT
Start: 2023-06-09 | End: 2023-06-09

## 2023-06-09 RX ORDER — METRONIDAZOLE 500 MG/1
500 TABLET ORAL EVERY 12 HOURS
Status: DISCONTINUED | OUTPATIENT
Start: 2023-06-09 | End: 2023-06-11

## 2023-06-09 RX ORDER — MORPHINE SULFATE 4 MG/ML
1 INJECTION INTRAVENOUS ONCE
Status: COMPLETED | OUTPATIENT
Start: 2023-06-09 | End: 2023-06-09

## 2023-06-09 RX ORDER — DEXTROSE AND SODIUM CHLORIDE 10; .45 G/100ML; G/100ML
INJECTION, SOLUTION INTRAVENOUS CONTINUOUS
Status: DISCONTINUED | OUTPATIENT
Start: 2023-06-09 | End: 2023-06-09

## 2023-06-09 RX ORDER — MAGNESIUM SULFATE HEPTAHYDRATE 40 MG/ML
2 INJECTION, SOLUTION INTRAVENOUS
Status: COMPLETED | OUTPATIENT
Start: 2023-06-09 | End: 2023-06-09

## 2023-06-09 RX ORDER — SODIUM CHLORIDE, SODIUM LACTATE, POTASSIUM CHLORIDE, AND CALCIUM CHLORIDE .6; .31; .03; .02 G/100ML; G/100ML; G/100ML; G/100ML
2000 INJECTION, SOLUTION INTRAVENOUS ONCE
Status: COMPLETED | OUTPATIENT
Start: 2023-06-09 | End: 2023-06-09

## 2023-06-09 RX ORDER — KETOROLAC TROMETHAMINE 30 MG/ML
15 INJECTION, SOLUTION INTRAMUSCULAR; INTRAVENOUS EVERY 6 HOURS PRN
Status: DISPENSED | OUTPATIENT
Start: 2023-06-09 | End: 2023-06-10

## 2023-06-09 RX ORDER — SODIUM CHLORIDE, SODIUM LACTATE, POTASSIUM CHLORIDE, AND CALCIUM CHLORIDE .6; .31; .03; .02 G/100ML; G/100ML; G/100ML; G/100ML
1000 INJECTION, SOLUTION INTRAVENOUS ONCE
Status: COMPLETED | OUTPATIENT
Start: 2023-06-09 | End: 2023-06-09

## 2023-06-09 RX ORDER — DEXTROSE AND SODIUM CHLORIDE 5; .45 G/100ML; G/100ML
INJECTION, SOLUTION INTRAVENOUS CONTINUOUS
Status: DISCONTINUED | OUTPATIENT
Start: 2023-06-09 | End: 2023-06-09

## 2023-06-09 RX ORDER — ONDANSETRON 2 MG/ML
4 INJECTION INTRAMUSCULAR; INTRAVENOUS EVERY 4 HOURS PRN
Status: DISCONTINUED | OUTPATIENT
Start: 2023-06-09 | End: 2023-06-16 | Stop reason: HOSPADM

## 2023-06-09 RX ORDER — SODIUM CHLORIDE 9 MG/ML
INJECTION, SOLUTION INTRAVENOUS CONTINUOUS
Status: DISCONTINUED | OUTPATIENT
Start: 2023-06-09 | End: 2023-06-09

## 2023-06-09 RX ORDER — HYDROCODONE BITARTRATE AND ACETAMINOPHEN 5; 325 MG/1; MG/1
1 TABLET ORAL EVERY 6 HOURS PRN
Status: DISCONTINUED | OUTPATIENT
Start: 2023-06-09 | End: 2023-06-10

## 2023-06-09 RX ADMIN — MORPHINE SULFATE 1 MG: 4 INJECTION INTRAVENOUS at 03:34

## 2023-06-09 RX ADMIN — HYDROCODONE BITARTRATE AND ACETAMINOPHEN 1 TABLET: 5; 325 TABLET ORAL at 22:06

## 2023-06-09 RX ADMIN — MAGNESIUM SULFATE HEPTAHYDRATE 2 G: 2 INJECTION, SOLUTION INTRAVENOUS at 02:47

## 2023-06-09 RX ADMIN — POTASSIUM PHOSPHATE, MONOBASIC AND POTASSIUM PHOSPHATE, DIBASIC 30 MMOL: 224; 236 INJECTION, SOLUTION, CONCENTRATE INTRAVENOUS at 14:31

## 2023-06-09 RX ADMIN — INSULIN HUMAN 5 UNITS: 100 INJECTION, SOLUTION PARENTERAL at 02:35

## 2023-06-09 RX ADMIN — POTASSIUM CHLORIDE 10 MEQ: 7.46 INJECTION, SOLUTION INTRAVENOUS at 07:47

## 2023-06-09 RX ADMIN — SENNOSIDES AND DOCUSATE SODIUM 2 TABLET: 50; 8.6 TABLET ORAL at 17:05

## 2023-06-09 RX ADMIN — SODIUM CHLORIDE, POTASSIUM CHLORIDE, SODIUM LACTATE AND CALCIUM CHLORIDE: 600; 310; 30; 20 INJECTION, SOLUTION INTRAVENOUS at 17:15

## 2023-06-09 RX ADMIN — METRONIDAZOLE 500 MG: 5 INJECTION, SOLUTION INTRAVENOUS at 06:12

## 2023-06-09 RX ADMIN — DEXTROSE AND SODIUM CHLORIDE: 5; 450 INJECTION, SOLUTION INTRAVENOUS at 03:35

## 2023-06-09 RX ADMIN — INSULIN LISPRO 3 UNITS: 100 INJECTION, SOLUTION INTRAVENOUS; SUBCUTANEOUS at 17:20

## 2023-06-09 RX ADMIN — SODIUM CHLORIDE, POTASSIUM CHLORIDE, SODIUM LACTATE AND CALCIUM CHLORIDE 2000 ML: 600; 310; 30; 20 INJECTION, SOLUTION INTRAVENOUS at 02:06

## 2023-06-09 RX ADMIN — HYDROCODONE BITARTRATE AND ACETAMINOPHEN 1 TABLET: 5; 325 TABLET ORAL at 18:28

## 2023-06-09 RX ADMIN — ENOXAPARIN SODIUM 40 MG: 100 INJECTION SUBCUTANEOUS at 17:05

## 2023-06-09 RX ADMIN — METRONIDAZOLE 500 MG: 500 TABLET ORAL at 17:05

## 2023-06-09 RX ADMIN — ONDANSETRON 4 MG: 2 INJECTION INTRAMUSCULAR; INTRAVENOUS at 21:26

## 2023-06-09 RX ADMIN — SODIUM CHLORIDE, POTASSIUM CHLORIDE, SODIUM LACTATE AND CALCIUM CHLORIDE 1000 ML: 600; 310; 30; 20 INJECTION, SOLUTION INTRAVENOUS at 07:50

## 2023-06-09 RX ADMIN — CEFTRIAXONE SODIUM 1000 MG: 1 INJECTION, POWDER, FOR SOLUTION INTRAMUSCULAR; INTRAVENOUS at 17:12

## 2023-06-09 RX ADMIN — INSULIN HUMAN 4 UNITS/HR: 1 INJECTION, SOLUTION INTRAVENOUS at 02:38

## 2023-06-09 RX ADMIN — SODIUM CHLORIDE: 9 INJECTION, SOLUTION INTRAVENOUS at 02:42

## 2023-06-09 RX ADMIN — ONDANSETRON 4 MG: 2 INJECTION INTRAMUSCULAR; INTRAVENOUS at 03:07

## 2023-06-09 RX ADMIN — INSULIN GLARGINE-YFGN 10 UNITS: 100 INJECTION, SOLUTION SUBCUTANEOUS at 17:20

## 2023-06-09 RX ADMIN — ACETAMINOPHEN 650 MG: 325 TABLET ORAL at 17:08

## 2023-06-09 RX ADMIN — ACETAMINOPHEN 650 MG: 325 TABLET ORAL at 02:35

## 2023-06-09 RX ADMIN — POTASSIUM CHLORIDE 10 MEQ: 7.46 INJECTION, SOLUTION INTRAVENOUS at 04:23

## 2023-06-09 RX ADMIN — KETOROLAC TROMETHAMINE 15 MG: 30 INJECTION, SOLUTION INTRAMUSCULAR at 21:12

## 2023-06-09 RX ADMIN — INSULIN GLARGINE-YFGN 10 UNITS: 100 INJECTION, SOLUTION SUBCUTANEOUS at 09:50

## 2023-06-09 RX ADMIN — ONDANSETRON 4 MG: 2 INJECTION INTRAMUSCULAR; INTRAVENOUS at 14:29

## 2023-06-09 RX ADMIN — POTASSIUM CHLORIDE 10 MEQ: 7.46 INJECTION, SOLUTION INTRAVENOUS at 03:13

## 2023-06-09 RX ADMIN — POTASSIUM CHLORIDE 10 MEQ: 7.46 INJECTION, SOLUTION INTRAVENOUS at 06:12

## 2023-06-09 ASSESSMENT — COGNITIVE AND FUNCTIONAL STATUS - GENERAL
SUGGESTED CMS G CODE MODIFIER DAILY ACTIVITY: CK
MOBILITY SCORE: 19
SUGGESTED CMS G CODE MODIFIER MOBILITY: CK
PERSONAL GROOMING: A LITTLE
PERSONAL GROOMING: A LITTLE
TOILETING: A LITTLE
STANDING UP FROM CHAIR USING ARMS: A LITTLE
MOVING TO AND FROM BED TO CHAIR: A LITTLE
DRESSING REGULAR UPPER BODY CLOTHING: A LITTLE
DRESSING REGULAR LOWER BODY CLOTHING: A LITTLE
MOVING FROM LYING ON BACK TO SITTING ON SIDE OF FLAT BED: A LITTLE
SUGGESTED CMS G CODE MODIFIER DAILY ACTIVITY: CK
CLIMB 3 TO 5 STEPS WITH RAILING: A LITTLE
WALKING IN HOSPITAL ROOM: A LITTLE
TOILETING: A LITTLE
SUGGESTED CMS G CODE MODIFIER MOBILITY: CK
MOBILITY SCORE: 19
CLIMB 3 TO 5 STEPS WITH RAILING: A LITTLE
EATING MEALS: A LITTLE
DRESSING REGULAR UPPER BODY CLOTHING: A LITTLE
HELP NEEDED FOR BATHING: A LITTLE
STANDING UP FROM CHAIR USING ARMS: A LITTLE
DRESSING REGULAR LOWER BODY CLOTHING: A LITTLE
DAILY ACTIVITIY SCORE: 18
MOVING FROM LYING ON BACK TO SITTING ON SIDE OF FLAT BED: A LITTLE
WALKING IN HOSPITAL ROOM: A LITTLE
DAILY ACTIVITIY SCORE: 18
EATING MEALS: A LITTLE
HELP NEEDED FOR BATHING: A LITTLE
MOVING TO AND FROM BED TO CHAIR: A LITTLE

## 2023-06-09 ASSESSMENT — ENCOUNTER SYMPTOMS
FALLS: 1
FEVER: 0
EYE REDNESS: 0
FOCAL WEAKNESS: 0
VOMITING: 0
SHORTNESS OF BREATH: 0

## 2023-06-09 ASSESSMENT — PATIENT HEALTH QUESTIONNAIRE - PHQ9
SUM OF ALL RESPONSES TO PHQ9 QUESTIONS 1 AND 2: 6
3. TROUBLE FALLING OR STAYING ASLEEP OR SLEEPING TOO MUCH: MORE THAN HALF THE DAYS
4. FEELING TIRED OR HAVING LITTLE ENERGY: MORE THAN HALF THE DAYS
5. POOR APPETITE OR OVEREATING: MORE THAN HALF THE DAYS
8. MOVING OR SPEAKING SO SLOWLY THAT OTHER PEOPLE COULD HAVE NOTICED. OR THE OPPOSITE, BEING SO FIGETY OR RESTLESS THAT YOU HAVE BEEN MOVING AROUND A LOT MORE THAN USUAL: MORE THAN HALF THE DAYS
7. TROUBLE CONCENTRATING ON THINGS, SUCH AS READING THE NEWSPAPER OR WATCHING TELEVISION: MORE THAN HALF THE DAYS
SUM OF ALL RESPONSES TO PHQ QUESTIONS 1-9: 18
2. FEELING DOWN, DEPRESSED, IRRITABLE, OR HOPELESS: NEARLY EVERY DAY
9. THOUGHTS THAT YOU WOULD BE BETTER OFF DEAD, OR OF HURTING YOURSELF: NOT AT ALL
6. FEELING BAD ABOUT YOURSELF - OR THAT YOU ARE A FAILURE OR HAVE LET YOURSELF OR YOUR FAMILY DOWN: MORE THAN HALF THE DAYS
1. LITTLE INTEREST OR PLEASURE IN DOING THINGS: NEARLY EVERY DAY

## 2023-06-09 ASSESSMENT — LIFESTYLE VARIABLES
HOW MANY TIMES IN THE PAST YEAR HAVE YOU HAD 5 OR MORE DRINKS IN A DAY: 0
EVER HAD A DRINK FIRST THING IN THE MORNING TO STEADY YOUR NERVES TO GET RID OF A HANGOVER: NO
ALCOHOL_USE: NO
HAVE PEOPLE ANNOYED YOU BY CRITICIZING YOUR DRINKING: NO
TOTAL SCORE: 0
CONSUMPTION TOTAL: NEGATIVE
ON A TYPICAL DAY WHEN YOU DRINK ALCOHOL HOW MANY DRINKS DO YOU HAVE: 0
HAVE YOU EVER FELT YOU SHOULD CUT DOWN ON YOUR DRINKING: NO
TOTAL SCORE: 0
EVER FELT BAD OR GUILTY ABOUT YOUR DRINKING: NO
AVERAGE NUMBER OF DAYS PER WEEK YOU HAVE A DRINK CONTAINING ALCOHOL: 0
TOTAL SCORE: 0

## 2023-06-09 ASSESSMENT — PAIN DESCRIPTION - PAIN TYPE
TYPE: ACUTE PAIN

## 2023-06-09 ASSESSMENT — PULMONARY FUNCTION TESTS
EPAP_CMH2O: 5
EPAP_CMH2O: 5

## 2023-06-09 ASSESSMENT — FIBROSIS 4 INDEX
FIB4 SCORE: 0.63
FIB4 SCORE: 0.52

## 2023-06-09 NOTE — ASSESSMENT & PLAN NOTE
Patient is status post I&D of the right thumb on 612  Pathology report resulted on 6/14, osteomyelitis is present on biopsy  MSSA  Appreciate ID recommendations, 6 weeks Ancef  PICC has been placed

## 2023-06-09 NOTE — PROGRESS NOTES
Rapid Response Summary     Rapid response called at 0029 hours for: altered mental status; witnessed, assisted fall    Pt initially lethargic upon arrival to room, however he is able to answer orientation questions and is oriented x 4. Pt noted to have white substance around mouth but it is unclear what it is. Staff present deny pt vomiting. Pt complaining of being cold. Second IV established. FSBG 359.    VS: Tachycardia  and Hypotensive (See Vitals Flowsheet)  Additional info: Per primary RN report, pt had a witnessed, assisted fall while in the bathroom. Positive LOC per report. Pt did become incontinent of urine during the event.  MD Paged: Dr. Macdonald  Interventions:    Imaging/Tests: EKG    Labs: CBC, CMP, Lactic Acid, Troponin, and Blood Glucose    Medications:  none   Other: PIV started   Disposition: Improved with rapid response team interventions. Primary RN updated on plan of care. Patient transferred to ICU.

## 2023-06-09 NOTE — PROGRESS NOTES
4 Eyes Skin Assessment Completed by PENNIE Bro and PENNIE Hahn.    Head WDL  Ears WDL  Nose WDL  Mouth WDL  Neck WDL  Breast/Chest WDL  Shoulder Blades Redness  Spine Redness  (R) Arm/Elbow/Hand Redness, Bruising, Abrasion, Scab, and Edema  (L) Arm/Elbow/Hand Bruising  Abdomen WDL  Groin WDL  Scrotum/Coccyx/Buttocks Redness and Excoriation  (R) Leg WDL  (L) Leg WDL  (R) Heel/Foot/Toe WDL  (L) Heel/Foot/Toe WDL          Devices In Places ECG, Blood Pressure Cuff, Pulse Ox, and Nasal Cannula      Interventions In Place Gray Ear Foams    Possible Skin Injury No    Pictures Uploaded Into Epic Yes  Wound Consult Placed N/A  RN Wound Prevention Protocol Ordered No

## 2023-06-09 NOTE — PROGRESS NOTES
Pt arrived via gurney, admitted to room 121 from ED. Pt is A&Ox4, sleepy, on 2L NC, complains epigastric pain level of 6/10. Pt oriented to room and call light. POC reviewed with pt. Fall precautions in place, bed at lowest position, bed alarm on. Hourly rounding in place.      2200 Attempted to complete pt's admit profile, pt lethargic and sleepy at this time. Non labored breathing observed, on 2L NC.

## 2023-06-09 NOTE — PROGRESS NOTES
Critical Care Progress Note    Date of admission  6/8/2023    Chief Complaint/Hospital Course  58 y.o. male with history of poorly controlled diabetes admitted 6/8/2023 with epigastric abdominal pain and right thumb redness and swelling past week.  He was admitted for right finger cellulitis, hyperglycemia and imaging concerning for enteritis.  Early 6/9 AM, patient currently became upset in the bathroom and had an assisted fall, losing consciousness for 3 to 4 minutes.  Upon evaluation he was alert and oriented and just reported being thirsty and worsening abdominal pain.  He was noted to have worsening anion gap metabolic acidosis and hyperglycemia with elevated BHOB that was concerning for DKA so he was given IV fluids and started on DKA protocol.  He was also noted to become apneic when he fell asleep and desaturated so he was started on BiPAP nightly.     6/9: Awake and alert to verbal stimuli but does fall asleep easily.  Anion gap closed and transition from DKA protocol to subcu insulin.      Interval Problem Update  Reviewed last 24 hour events:  Cardiac: BP 90-100s, HR 70s  Pulm: BiPAP - 7.375/40/104  Heme: stable  /renal: wnl  GI/endo: npo  ID:  mild leukocytosis    I/O: +1.6L - bladder scan (distended on CT)  Additional Labs/Imaging:   - CXR w/o focal infiltrates  - CT A/P w/c/f enteritis  - transition DKA gtt to SQ insulin     Review of Systems  Review of Systems   Constitutional:  Positive for malaise/fatigue. Negative for fever.   Eyes:  Negative for redness.   Respiratory:  Negative for shortness of breath.    Cardiovascular:  Negative for chest pain.   Gastrointestinal:  Negative for vomiting.   Musculoskeletal:  Positive for falls.   Neurological:  Negative for focal weakness.        Vital Signs for last 24 hours   Temp:  [36.3 °C (97.4 °F)-36.7 °C (98.1 °F)] 36.5 °C (97.7 °F)  Pulse:  [] 80  Resp:  [9-22] 15  BP: ()/(41-92) 103/65  SpO2:  [78 %-100 %] 98 %    Hemodynamic parameters  for last 24 hours       Respiratory Information for the last 24 hours       Physical Exam   Physical Exam  Vitals and nursing note reviewed.   Constitutional:       Appearance: He is ill-appearing.   HENT:      Head: Normocephalic.      Mouth/Throat:      Mouth: Mucous membranes are dry.   Eyes:      Conjunctiva/sclera: Conjunctivae normal.   Cardiovascular:      Rate and Rhythm: Normal rate and regular rhythm.   Pulmonary:      Effort: Pulmonary effort is normal. No respiratory distress.   Abdominal:      General: There is no distension.      Palpations: Abdomen is soft.   Musculoskeletal:      Comments: Mild erythema of R thumb, no significant warmth. No TTP.  Poor hygiene noted of his nails. Very calloused hands with several cuts in skin   Skin:     General: Skin is warm and dry.   Neurological:      General: No focal deficit present.      Mental Status: He is alert.         Medications  Current Facility-Administered Medications   Medication Dose Route Frequency Provider Last Rate Last Admin    potassium phosphate 30 mmol in  mL ivpb  30 mmol Intravenous Once PRN Rama Shea M.D. 125 mL/hr at 06/09/23 1431 30 mmol at 06/09/23 1431    ondansetron (ZOFRAN) syringe/vial injection 4 mg  4 mg Intravenous Q4HRS PRN Rama Shea M.D.   4 mg at 06/09/23 1429    insulin GLARGINE (Lantus,Semglee) injection  10 Units Subcutaneous BID Cynthia Mclain M.D.   10 Units at 06/09/23 0950    And    insulin lispro (HumaLOG,AdmeLOG) injection  3-14 Units Subcutaneous 4X/DAY ACHS Cynthia Mclain M.D.        And    dextrose 10 % BOLUS 25 g  25 g Intravenous Q15 MIN PRN Cynthia Mclain M.D.        metroNIDAZOLE (FLAGYL) tablet 500 mg  500 mg Oral Q12HRS Cynthia Mclain M.D.        cefTRIAXone (Rocephin) 1,000 mg in  mL IVPB  1,000 mg Intravenous Q EVENING Bianca Lauren M.D.   Stopped at 06/08/23 2314    acetaminophen (Tylenol) tablet 650 mg  650 mg Oral Q6HRS PRN Bianca Lauren  M.D.   650 mg at 06/09/23 0235    senna-docusate (PERICOLACE or SENOKOT S) 8.6-50 MG per tablet 2 Tablet  2 Tablet Oral BID Bianca Lauren M.D.        And    polyethylene glycol/lytes (MIRALAX) PACKET 1 Packet  1 Packet Oral QDAY PRN Bianca Lauren M.D.        And    magnesium hydroxide (MILK OF MAGNESIA) suspension 30 mL  30 mL Oral QDAY PRN Bianca Lauren M.D.        And    bisacodyl (DULCOLAX) suppository 10 mg  10 mg Rectal QDAY PRN Bianca Lauren M.D.        enoxaparin (Lovenox) inj 40 mg  40 mg Subcutaneous DAILY AT 1800 Bianca Lauren M.D.        Pharmacy Consult Request ...Pain Management Review 1 Each  1 Each Other PHARMACY TO DOSE Bianca Lauren M.D.           Fluids    Intake/Output Summary (Last 24 hours) at 6/9/2023 1645  Last data filed at 6/9/2023 1600  Gross per 24 hour   Intake 5584.99 ml   Output 1750 ml   Net 3834.99 ml       Laboratory  Recent Labs     06/09/23  0430   ISTATAPH 7.375*   ISTATAPCO2 40.1*   ISTATAPO2 104*   ISTATATCO2 25   HZTCAVR8DRA 98   ISTATARTHCO3 23.4   ISTATARTBE -2   ISTATTEMP 98.1 F   ISTATSPEC Arterial   ISTATAPHTC 7.379*   QRVNDLTB1NX 102*         Recent Labs     06/09/23  0036 06/09/23  0118 06/09/23  0535 06/09/23  1000   SODIUM 140  --  139 136   POTASSIUM 4.3  --  4.4 3.9   CHLORIDE 96  --  104 103   CO2 17*  --  23 26   BUN 29*  --  24* 22   CREATININE 0.97  --  0.71 0.65   MAGNESIUM 2.0 1.9  --  2.0   PHOSPHORUS 3.7 3.6  --  1.9*   CALCIUM 8.6  --  7.8* 7.7*     Recent Labs     06/08/23  1328 06/09/23  0036 06/09/23  0535 06/09/23  1000   ALTSGPT 11 11  --   --    ASTSGOT 9* 9*  --   --    ALKPHOSPHAT 85 92  --   --    TBILIRUBIN 0.4 0.3  --   --    LIPASE 13  --   --   --    GLUCOSE 401* 335* 239* 284*     Recent Labs     06/08/23  1328 06/09/23  0036 06/09/23  0118   WBC 12.7* 16.1* 12.8*   NEUTSPOLYS 89.30*  --  90.70*   LYMPHOCYTES 5.70*  --  5.00*   MONOCYTES 4.40  --  3.40   EOSINOPHILS 0.00  --  0.00   BASOPHILS 0.10  --  0.10   ASTSGOT 9* 9*  --     ALTSGPT 11 11  --    ALKPHOSPHAT 85 92  --    TBILIRUBIN 0.4 0.3  --      Recent Labs     06/08/23  1328 06/09/23  0036 06/09/23  0118   RBC 4.41* 4.92 4.35*   HEMOGLOBIN 12.6* 13.9* 12.4*   HEMATOCRIT 37.9* 42.3 37.7*   PLATELETCT 229 308 255       Imaging  Reviewed     Assessment/Plan    Syncope  Unclear why he had the syncopal event.  No seizure activity noted.  He was alert and oriented when he.  Was never hemodynamically unstable.  Could potentially be that he had a vasovagal episode.  Potentially from his dehydration and DKA. ABG without any significant acidosis.  -Telemetry monitoring  -Monitor and replete lytes appropriately  -Management of underlying problems as noted    DKA  Patient admitted with uncontrolled hyperglycemia and then developed an anion gap metabolic acidosis with elevated BHOB so he was started on DKA protocol.   -Start subcu Lantus 10 units twice daily and transition off insulin drip  -High insulin sliding scale  -Diabetic diet  -IV fluids as needed  -Diabetic education    Sepsis  Likely secondary to enteritis seen on CT and also with his right finger cellulitis  -Continue ceftriaxone and Flagyl-wean as appropriate  -Follow-up cultures  -Right finger without any significant tenderness or findings concerning for severe staph infection or anaerobic infection-monitor  -IV fluids until he tolerates p.o. diet well    Apnea  Has been noted to have apneic episodes and desaturations when he falls asleep along with snoring-likely has undiagnosed WILLIAM  -CPAP nightly    Urinary retention  Patient with significant urinary retention -noted to have some mild hydronephrosis on imaging and distended bladder.   - insert jiménez  - can start PO meds if doesn't pass voiding trials    VTE:  Lovenox  Ulcer: H2 Antagonist  Lines: Jiménez Catheter  Ongoing indication addressed    I have performed a physical exam and reviewed and updated ROS and Plan today (6/9/2023). In review of yesterday's note (6/8/2023), there  are no changes except as documented above.     Discussed patient condition and risk of morbidity and/or mortality with RN, RT, Pharmacy, Charge nurse / hot rounds, and Patient  The patient remains critically ill.  Critical care time = 60 minutes in directly providing and coordinating critical care and extensive data review.  No time overlap and excludes procedures.        Cynthia Mclain MD  Pulmonary and Critical Care Medicine  [facility]

## 2023-06-09 NOTE — ASSESSMENT & PLAN NOTE
With marked hyperglycemia  Last glycated hemoglobin was 15.2% indicating a very poor control  I will start Premeal, long & short acting insulin for now  Accu-Checks, hypoglycemia protocol  Adjust according to blood sugars trend     6/10 continue Lantus at 10 units twice daily, SSI

## 2023-06-09 NOTE — PROGRESS NOTES
Pt requested to go to the [facility] while this RN was changing pt's pulse oximeter sensor. Pt became unsteady once inside bathroom and had an assisted fall. Pt assisted up to commode by 2 RNS and CNA, abrasion noted to pt's back post fall, pt became unresponsive and had a period of incontinence. Rapid response called.     0700 Attempted to call pt's son to give update regarding pt's assisted fall and ICU transfer, no answer.

## 2023-06-09 NOTE — ASSESSMENT & PLAN NOTE
Likely multifactorial due to reduced intravascular volume/hyperglycemia/dehydration, infection/colitis  Anticipate improvement with intravenous fluids and initiating insulin, continue to monitor

## 2023-06-09 NOTE — PROGRESS NOTES
12 hour chart check complete.    Monitor Summary:    Rhythm: SR  HR: 90's  Ectopy: r Trig o PVC r Big  0.18/0.10/0.38

## 2023-06-09 NOTE — H&P
"Hospital Medicine History & Physical Note    Date of Service  6/8/2023    Primary Care Physician  YESY Tariq.    Consultants  None     Code Status  Full Code    Chief Complaint  Chief Complaint   Patient presents with    Epigastric Pain     Pt reports pain above his belly button that has been there for a a \"few days\", pt has history of abd pain with GI follow up to happen 6/15  Pt states that over the last few days he has had three episodes of vomiting   Pt was initially hypotensive with pressure 70/30 upon EMS arrival post 1L IV fluids improved to 98/66 and 5 mg of morphine was given. Pt than became hypoxic requiring 3L NC. ERP called to bedside due to pt presentation        History of Presenting Illness  Christoph Taylor is a 58 y.o. male with a past medical history of poorly controlled diabetes who presented 6/8/2023 with abdominal pain and right finger pain redness and swelling.  Patient reports that he has not been feeling well over the past 1 week and got much worse over the past 2 days.  Has been having progressively worsening abdominal pain.  The pain is rated as severe located mostly in the epigastric region.  He does have nausea but no diarrhea.  Pain is worse after eating.  He denies noticing any blood or mucus in his stool.       I discussed the plan of care with emergency department physician, and the patient.    Review of Systems  Review of Systems   Constitutional:  Negative for chills and fever.   Eyes:  Negative for discharge and redness.   Respiratory:  Negative for cough, shortness of breath and stridor.    Cardiovascular:  Negative for chest pain and leg swelling.   Gastrointestinal:  Positive for abdominal pain, nausea and vomiting. Negative for blood in stool and diarrhea.   Genitourinary:  Negative for flank pain.   Musculoskeletal:  Negative for myalgias.        Pain redness and swelling of the right thumb   Skin: Negative.    Neurological:  Negative for focal weakness. "   Endo/Heme/Allergies:  Does not bruise/bleed easily.   Psychiatric/Behavioral:  The patient is not nervous/anxious.      Past Medical History   has a past medical history of Abnormal electrocardiogram (ECG) (EKG) (11/8/2021), KRISTAL (acute kidney injury) (Former[facility]) (11/8/2021), Chest pain in adult (11/8/2021), CKD (chronic kidney disease) stage 3, GFR 30-59 ml/min ([facility]) (11/7/2021), Diabetes ([facility]), Diabetic ketoacidosis without coma associated with type 2 diabetes mellitus ([facility]) (11/7/2021), Diarrhea (3/18/2022), DKA, type 1, not at goal ([facility]) (7/28/2022), Esophagitis (7/28/2022), [place] (hard of hearing), and Hypercholesteremia.    Surgical History   has a past surgical history that includes other and pr upper gi endoscopy,diagnosis (N/A, 3/14/2023).     Family History  family history includes Heart Disease in his father.      Social History   reports that he has quit smoking. He has never used smokeless tobacco. He reports that he does not currently use alcohol. He reports that he does not currently use drugs.    Allergies  No Known Allergies    Medications  Prior to Admission Medications   Prescriptions Last Dose Informant Patient Reported? Taking?   Multiple Vitamin (ONE-A-DAY MENS) Tab 6/6/2023 at [facility] Patient Yes No   Sig: Take 1 Tablet by mouth every day.   insulin 70/30 (HUMULIN 70/30) (70-30) 100 UNIT/ML Suspension 6/6/2023 at [facility] Patient No No   Sig: INJECT SUBCUTANEOUSLY 2 TO 28  UNITS TWICE DAILY PER SLIDING  SCALE (MAX OF 28 UNITS PER DAY)   Patient taking differently: Inject 2-28 Units under the skin 2 times a day. INJECT SUBCUTANEOUSLY 2 TO 28  UNITS TWICE DAILY PER SLIDING  SCALE (MAX OF 28 UNITS PER DAY)   oxyCODONE immediate-release (ROXICODONE) 5 MG Tab 6/7/2023 at [facility] Patient Yes No   Sig: Take 5 mg by mouth every 8 hours as needed.      Facility-Administered Medications: None     Physical Exam  Temp:  [36.7 °C (98 °F)] 36.7 °C (98 °F)  Pulse:  [92-96] 94  Resp:  [10-20] 19  BP: ()/(56-83)  104/69  SpO2:  [92 %-100 %] 95 %  Blood Pressure: 114/69   Temperature: 36.7 °C (98 °F)   Pulse: 93   Respiration: 17   Pulse Oximetry: 93 %     Physical Exam  Constitutional:       General: He is not in acute distress.     Appearance: He is not ill-appearing or diaphoretic.   HENT:      Head: Atraumatic.      Right Ear: External ear normal.      Left Ear: External ear normal.      Nose: No congestion or rhinorrhea.      Mouth/Throat:      Mouth: Mucous membranes are dry.   Eyes:      General: No scleral icterus.        Right eye: No discharge.         Left eye: No discharge.      Pupils: Pupils are equal, round, and reactive to light.   Cardiovascular:      Rate and Rhythm: Normal rate and regular rhythm.   Pulmonary:      Effort: Pulmonary effort is normal.   Abdominal:      General: There is no distension.      Tenderness: There is abdominal tenderness. There is no guarding.   Musculoskeletal:         General: Swelling and tenderness present.      Cervical back: Neck supple. No rigidity. No muscular tenderness.      Right lower leg: No edema.      Left lower leg: No edema.      Comments: Edema, erythema and tenderness of the right thumb   Skin:     General: Skin is dry.      Capillary Refill: Capillary refill takes 2 to 3 seconds.      Coloration: Skin is not jaundiced or pale.   Neurological:      Mental Status: He is alert and oriented to person, place, and time.      Coordination: Coordination normal.   Psychiatric:         Mood and Affect: Mood normal.         Behavior: Behavior normal.       Laboratory:  Recent Labs     06/08/23  1328   WBC 12.7*   RBC 4.41*   HEMOGLOBIN 12.6*   HEMATOCRIT 37.9*   MCV 85.9   MCH 28.6   MCHC 33.2   RDW 38.4   PLATELETCT 229   MPV 9.1     Recent Labs     06/08/23  1328   SODIUM 136   POTASSIUM 4.5   CHLORIDE 91*   CO2 21   GLUCOSE 401*   BUN 28*   CREATININE 0.75   CALCIUM 8.7     Recent Labs     06/08/23  1328   ALTSGPT 11   ASTSGOT 9*   ALKPHOSPHAT 85   TBILIRUBIN 0.4    LIPASE 13   GLUCOSE 401*         No results for input(s): NTPROBNP in the last 72 hours.      Recent Labs     06/08/23  1328   TROPONINT 27*     Imaging:  CT-ABDOMEN-PELVIS WITH   Final Result      1.  Horseshoe kidney with mild hydronephrosis, possibly related to bladder distention. No obstructing stone seen.   2.  Small, nonobstructing renal stones.   3.  Features suggestive of enteritis. No evidence of obstruction.   4.  Patchy consolidations of the left lower lobe, likely infectious or inflammatory.        Assessment/Plan:  Justification for Admission Status  I anticipate this patient will require at least two midnights for appropriate medical management, necessitating inpatient admission because the patient has enterocolitis and cellulitis, in addition to hyperglycemia.  The patient will require intravenous antibiotics and starting insulin/monitoring his blood sugars    * Cellulitis of finger of right hand- (present on admission)  Assessment & Plan  We will start ceftriaxone, follow on cultures and sensitivities    Abdominal pain likely secondary to enterocolitis- (present on admission)  Assessment & Plan  I will check CT scan of the abdomen for further evaluation  I will start ceftriaxone and metronidazole, follow on cultures and sensitivities  I will start a modified clear liquid diet, advance as tolerated    Uncontrolled type 2 diabetes mellitus with hyperglycemia (HCC)- (present on admission)  Assessment & Plan  With marked hyperglycemia  Last glycated hemoglobin was 15.2% indicating a very poor control  I will start Premeal, long & short acting insulin for now  Accu-Checks, hypoglycemia protocol  Adjust according to blood sugars trend     Dyslipidemia- (present on admission)  Assessment & Plan  Cardiac diet when able to tolerate orally    Metabolic acidosis, increased anion gap- (present on admission)  Assessment & Plan  Likely multifactorial due to reduced intravascular volume/hyperglycemia/dehydration,  infection/colitis  Anticipate improvement with intravenous fluids and initiating insulin, continue to monitor      VTE prophylaxis: SCDs/TEDs and enoxaparin ppx

## 2023-06-09 NOTE — PROGRESS NOTES
OVERNIGHT HOSPITALIST:    RAPID RESPONSE called at 12:20 am.  Patient became upset in the bathroom and had an assisted fall, losing consciousness and unresponsive for approximately 3 to 4 minutes.  I immediately came to the room to evaluate patient, by the time I arrived to the room, he was able to answer my questions and was oriented x3.  There was no head injury.  Patient complaining of being thirsty and having abdominal pain.  Patient denies prior history of seizures.  Nursing staff reports he was incontinent of urine during this event.  There was no witness jerk movements.      Brief History: 58-year-old male, with history of poorly controlled type 2 diabetes  (Hgb A1C 15.2 in March/ 2023), who was admitted earlier today with cellulitis of his right finger, colitis and poorly controlled diabetes.      Vitals: Temp:36.4       HR:95      RR:18      BP:96/64  Gen: Somnolent but answering questions appropriately  Lungs: Clear to auscultation bilaterally with no wheezing  Heart: Tachycardic, no murmurs  Abd: Soft, nondistended with minimal diffuse pain to palpation.  Ext: No significant edema    Stat labs were done, on his chemistry panel he has increased anion gap from 24 earlier to 27 now and his CO2 is down to 17.  His glucose is 335 with increased lactic acid 3.2.  Initial troponin 30    I reviewed his chart, he has previous admissions for DKA and typically, his CO2 on admission is around 17.    EKG: Sinus tachycardia at 95 bpm, No acute ST elevation.          A/P:  #1:DKA  #2:Syncopal Episode with demand ischemia likely due to #1.  #3.Acute Metabolic Encephalopathy  #4.Lactic Acidosis    -Patient be transferred to the intensive care unit.  -N.p.o. for now  -Pain and nausea control PRN  -Initial anion gap: 24>27.  His CO2 is down to 17 from previously being 21.  -Hemoglobin A1c at baseline is 15.2.  -Patient is getting started on his insulin gtt and will give 5 units of regular insulin bolus.  -Beta hydroxy  bariatric acid was added as well.  -IV fluids given: We will start with 2 L now.  -Starting DKA orders with every 4 hours BMP, potassium replacement and IV fluids according to glucose level  -Patient regular regimen for diabetes includes:  -Continue serial troponin and will do echocardiogram in the morning given no symptoms likely secondary to ongoing colitis causing hypotension/vasovagal due to underlying developing DKA.    The patient remains critically ill.  Critical care time =59  minutes in directly providing and coordinating critical care and extensive data review.  No time overlap and excludes procedures.

## 2023-06-09 NOTE — CARE PLAN
Problem: Pain - Standard  Goal: Alleviation of pain or a reduction in pain to the patient’s comfort goal  Outcome: Not Progressing     Problem: Hemodynamics  Goal: Patient's hemodynamics, fluid balance and neurologic status will be stable or improve  Outcome: Progressing     Problem: Fluid Volume  Goal: Fluid volume balance will be maintained  Outcome: Progressing     Problem: Urinary - Renal Perfusion  Goal: Ability to achieve and maintain adequate renal perfusion and functioning will improve  Outcome: Progressing     Problem: Skin Integrity  Goal: Skin integrity is maintained or improved  Outcome: Not Progressing     The patient is Watcher - Medium risk of patient condition declining or worsening    Shift Goals  Clinical Goals: transition off isulin drip  Patient Goals: rest  Family Goals: vicente    Progress made toward(s) clinical / shift goals:  Patient transitioned off insulin drip at 11:00A. Ate some bites of breakfast & lunch, poor appetite overall. Zofran given x 1 for nausea. Reports of abdominal pain. Patient drowsy throughout shift, frequent attempts to engage/awaken unsuccessful. Severe sleep apnea, RT placed on CPAP due to frequent desaturations on NC/oxymask. Drug screen +. Belongings searched, only clothing, shoes, & AFO brace found (in closet). Potassium phosphate currently infusing, will collect BMP after replaced. Hypotension noted this AM, resolved after 1 additional liter of LR.     Patient is not progressing towards the following goals:      Problem: Pain - Standard  Goal: Alleviation of pain or a reduction in pain to the patient’s comfort goal  Outcome: Not Progressing     Problem: Skin Integrity  Goal: Skin integrity is maintained or improved  Outcome: Not Progressing

## 2023-06-09 NOTE — PROGRESS NOTES
4 Eyes Skin Assessment Completed by Ma, RN and PENNIE Hills.    Head WDL  Ears WDL  Nose WDL  Mouth WDL  Neck WDL  Breast/Chest WDL  Shoulder Blades WDL  Spine WDL  (R) Arm/Elbow/Hand Redness and Swelling on thumb  (L) Arm/Elbow/Hand Cut between thumb and 2nd finger  Abdomen WDL  Groin WDL  Scrotum/Coccyx/Buttocks WDL  (R) Leg Scab  (L) Leg Scab  (R) Heel/Foot/Toe WDL  (L) Heel/Foot/Toe WDL          Devices In Places Blood Pressure Cuff, Pulse Ox, SCD's, and Nasal Cannula      Interventions In Place NC W/Ear Foams and Pillows    Possible Skin Injury No    Pictures Uploaded Into Epic Yes  Wound Consult Placed N/A  RN Wound Prevention Protocol Ordered No

## 2023-06-10 PROBLEM — R79.89 ELEVATED TROPONIN: Status: ACTIVE | Noted: 2023-06-10

## 2023-06-10 PROBLEM — K52.9 ENTERITIS: Status: ACTIVE | Noted: 2023-06-10

## 2023-06-10 LAB
ANION GAP SERPL CALC-SCNC: 8 MMOL/L (ref 7–16)
APPEARANCE UR: CLEAR
BASOPHILS # BLD AUTO: 0.1 % (ref 0–1.8)
BASOPHILS # BLD: 0.01 K/UL (ref 0–0.12)
BILIRUB UR QL STRIP.AUTO: NEGATIVE
BUN SERPL-MCNC: 15 MG/DL (ref 8–22)
CALCIUM SERPL-MCNC: 7.6 MG/DL (ref 8.4–10.2)
CHLORIDE SERPL-SCNC: 101 MMOL/L (ref 96–112)
CO2 SERPL-SCNC: 27 MMOL/L (ref 20–33)
COLOR UR: YELLOW
CREAT SERPL-MCNC: 0.53 MG/DL (ref 0.5–1.4)
EOSINOPHIL # BLD AUTO: 0.01 K/UL (ref 0–0.51)
EOSINOPHIL NFR BLD: 0.1 % (ref 0–6.9)
ERYTHROCYTE [DISTWIDTH] IN BLOOD BY AUTOMATED COUNT: 39.4 FL (ref 35.9–50)
GFR SERPLBLD CREATININE-BSD FMLA CKD-EPI: 115 ML/MIN/1.73 M 2
GLUCOSE BLD STRIP.AUTO-MCNC: 121 MG/DL (ref 65–99)
GLUCOSE BLD STRIP.AUTO-MCNC: 136 MG/DL (ref 65–99)
GLUCOSE BLD STRIP.AUTO-MCNC: 136 MG/DL (ref 65–99)
GLUCOSE BLD STRIP.AUTO-MCNC: 158 MG/DL (ref 65–99)
GLUCOSE BLD STRIP.AUTO-MCNC: 188 MG/DL (ref 65–99)
GLUCOSE SERPL-MCNC: 134 MG/DL (ref 65–99)
GLUCOSE UR STRIP.AUTO-MCNC: 250 MG/DL
HCT VFR BLD AUTO: 33.7 % (ref 42–52)
HGB BLD-MCNC: 11.3 G/DL (ref 14–18)
IMM GRANULOCYTES # BLD AUTO: 0.06 K/UL (ref 0–0.11)
IMM GRANULOCYTES NFR BLD AUTO: 0.5 % (ref 0–0.9)
KETONES UR STRIP.AUTO-MCNC: 40 MG/DL
LEUKOCYTE ESTERASE UR QL STRIP.AUTO: NEGATIVE
LYMPHOCYTES # BLD AUTO: 0.98 K/UL (ref 1–4.8)
LYMPHOCYTES NFR BLD: 8.7 % (ref 22–41)
MAGNESIUM SERPL-MCNC: 1.8 MG/DL (ref 1.5–2.5)
MCH RBC QN AUTO: 28.5 PG (ref 27–33)
MCHC RBC AUTO-ENTMCNC: 33.5 G/DL (ref 32.3–36.5)
MCV RBC AUTO: 85.1 FL (ref 81.4–97.8)
MICRO URNS: ABNORMAL
MONOCYTES # BLD AUTO: 0.59 K/UL (ref 0–0.85)
MONOCYTES NFR BLD AUTO: 5.2 % (ref 0–13.4)
NEUTROPHILS # BLD AUTO: 9.66 K/UL (ref 1.82–7.42)
NEUTROPHILS NFR BLD: 85.4 % (ref 44–72)
NITRITE UR QL STRIP.AUTO: NEGATIVE
NRBC # BLD AUTO: 0 K/UL
NRBC BLD-RTO: 0 /100 WBC (ref 0–0.2)
PH UR STRIP.AUTO: 5.5 [PH] (ref 5–8)
PHOSPHATE SERPL-MCNC: 2.3 MG/DL (ref 2.5–4.5)
PLATELET # BLD AUTO: 226 K/UL (ref 164–446)
PMV BLD AUTO: 9.1 FL (ref 9–12.9)
POTASSIUM SERPL-SCNC: 3.6 MMOL/L (ref 3.6–5.5)
PROT UR QL STRIP: NEGATIVE MG/DL
RBC # BLD AUTO: 3.96 M/UL (ref 4.7–6.1)
RBC UR QL AUTO: NEGATIVE
SODIUM SERPL-SCNC: 136 MMOL/L (ref 135–145)
SP GR UR STRIP.AUTO: 1.02
WBC # BLD AUTO: 11.3 K/UL (ref 4.8–10.8)

## 2023-06-10 PROCEDURE — 94660 CPAP INITIATION&MGMT: CPT

## 2023-06-10 PROCEDURE — 80048 BASIC METABOLIC PNL TOTAL CA: CPT

## 2023-06-10 PROCEDURE — 83735 ASSAY OF MAGNESIUM: CPT

## 2023-06-10 PROCEDURE — 770020 HCHG ROOM/CARE - TELE (206)

## 2023-06-10 PROCEDURE — A9270 NON-COVERED ITEM OR SERVICE: HCPCS | Performed by: STUDENT IN AN ORGANIZED HEALTH CARE EDUCATION/TRAINING PROGRAM

## 2023-06-10 PROCEDURE — 99233 SBSQ HOSP IP/OBS HIGH 50: CPT | Performed by: STUDENT IN AN ORGANIZED HEALTH CARE EDUCATION/TRAINING PROGRAM

## 2023-06-10 PROCEDURE — 700111 HCHG RX REV CODE 636 W/ 250 OVERRIDE (IP): Performed by: HOSPITALIST

## 2023-06-10 PROCEDURE — 84100 ASSAY OF PHOSPHORUS: CPT

## 2023-06-10 PROCEDURE — 700105 HCHG RX REV CODE 258: Performed by: HOSPITALIST

## 2023-06-10 PROCEDURE — 700102 HCHG RX REV CODE 250 W/ 637 OVERRIDE(OP): Performed by: HOSPITALIST

## 2023-06-10 PROCEDURE — 81003 URINALYSIS AUTO W/O SCOPE: CPT

## 2023-06-10 PROCEDURE — A9270 NON-COVERED ITEM OR SERVICE: HCPCS | Performed by: HOSPITALIST

## 2023-06-10 PROCEDURE — 700105 HCHG RX REV CODE 258: Performed by: STUDENT IN AN ORGANIZED HEALTH CARE EDUCATION/TRAINING PROGRAM

## 2023-06-10 PROCEDURE — 94760 N-INVAS EAR/PLS OXIMETRY 1: CPT

## 2023-06-10 PROCEDURE — 36415 COLL VENOUS BLD VENIPUNCTURE: CPT

## 2023-06-10 PROCEDURE — 700102 HCHG RX REV CODE 250 W/ 637 OVERRIDE(OP): Performed by: STUDENT IN AN ORGANIZED HEALTH CARE EDUCATION/TRAINING PROGRAM

## 2023-06-10 PROCEDURE — 85025 COMPLETE CBC W/AUTO DIFF WBC: CPT

## 2023-06-10 PROCEDURE — 82962 GLUCOSE BLOOD TEST: CPT | Mod: 91

## 2023-06-10 RX ORDER — OXYCODONE HYDROCHLORIDE 5 MG/1
5 TABLET ORAL EVERY 4 HOURS PRN
Status: DISCONTINUED | OUTPATIENT
Start: 2023-06-10 | End: 2023-06-16 | Stop reason: HOSPADM

## 2023-06-10 RX ORDER — OXYCODONE HYDROCHLORIDE 10 MG/1
10 TABLET ORAL EVERY 4 HOURS PRN
Status: DISCONTINUED | OUTPATIENT
Start: 2023-06-10 | End: 2023-06-16 | Stop reason: HOSPADM

## 2023-06-10 RX ADMIN — ENOXAPARIN SODIUM 40 MG: 100 INJECTION SUBCUTANEOUS at 17:21

## 2023-06-10 RX ADMIN — INSULIN GLARGINE-YFGN 10 UNITS: 100 INJECTION, SOLUTION SUBCUTANEOUS at 05:56

## 2023-06-10 RX ADMIN — SODIUM CHLORIDE, POTASSIUM CHLORIDE, SODIUM LACTATE AND CALCIUM CHLORIDE: 600; 310; 30; 20 INJECTION, SOLUTION INTRAVENOUS at 03:25

## 2023-06-10 RX ADMIN — OXYCODONE HYDROCHLORIDE 10 MG: 10 TABLET ORAL at 10:44

## 2023-06-10 RX ADMIN — HYDROCODONE BITARTRATE AND ACETAMINOPHEN 1 TABLET: 5; 325 TABLET ORAL at 08:29

## 2023-06-10 RX ADMIN — DIBASIC SODIUM PHOSPHATE, MONOBASIC POTASSIUM PHOSPHATE AND MONOBASIC SODIUM PHOSPHATE 500 MG: 852; 155; 130 TABLET ORAL at 08:20

## 2023-06-10 RX ADMIN — INSULIN LISPRO 3 UNITS: 100 INJECTION, SOLUTION INTRAVENOUS; SUBCUTANEOUS at 17:08

## 2023-06-10 RX ADMIN — ONDANSETRON 4 MG: 2 INJECTION INTRAMUSCULAR; INTRAVENOUS at 08:29

## 2023-06-10 RX ADMIN — SENNOSIDES AND DOCUSATE SODIUM 2 TABLET: 50; 8.6 TABLET ORAL at 05:54

## 2023-06-10 RX ADMIN — SODIUM CHLORIDE, POTASSIUM CHLORIDE, SODIUM LACTATE AND CALCIUM CHLORIDE: 600; 310; 30; 20 INJECTION, SOLUTION INTRAVENOUS at 23:54

## 2023-06-10 RX ADMIN — CEFTRIAXONE SODIUM 1000 MG: 1 INJECTION, POWDER, FOR SOLUTION INTRAMUSCULAR; INTRAVENOUS at 17:20

## 2023-06-10 RX ADMIN — METRONIDAZOLE 500 MG: 500 TABLET ORAL at 17:21

## 2023-06-10 RX ADMIN — DIBASIC SODIUM PHOSPHATE, MONOBASIC POTASSIUM PHOSPHATE AND MONOBASIC SODIUM PHOSPHATE 500 MG: 852; 155; 130 TABLET ORAL at 11:23

## 2023-06-10 RX ADMIN — INSULIN GLARGINE-YFGN 10 UNITS: 100 INJECTION, SOLUTION SUBCUTANEOUS at 17:06

## 2023-06-10 RX ADMIN — METRONIDAZOLE 500 MG: 500 TABLET ORAL at 05:54

## 2023-06-10 RX ADMIN — DIBASIC SODIUM PHOSPHATE, MONOBASIC POTASSIUM PHOSPHATE AND MONOBASIC SODIUM PHOSPHATE 500 MG: 852; 155; 130 TABLET ORAL at 17:21

## 2023-06-10 RX ADMIN — OXYCODONE 5 MG: 5 TABLET ORAL at 17:21

## 2023-06-10 RX ADMIN — SODIUM CHLORIDE, POTASSIUM CHLORIDE, SODIUM LACTATE AND CALCIUM CHLORIDE: 600; 310; 30; 20 INJECTION, SOLUTION INTRAVENOUS at 13:49

## 2023-06-10 ASSESSMENT — PAIN DESCRIPTION - PAIN TYPE
TYPE: ACUTE PAIN

## 2023-06-10 ASSESSMENT — FIBROSIS 4 INDEX: FIB4 SCORE: 0.71

## 2023-06-10 NOTE — PROGRESS NOTES
Telemetry Shift Summary    Rhythm SR  HR Range 75-79  Ectopy F-PVC, R-Coup/Big/Trig  Measurements 0.20/0.08/0.40        Normal Values  Rhythm SR  HR Range    Measurements 0.12-0.20 / 0.06-0.10  / 0.30-0.52

## 2023-06-10 NOTE — CARE PLAN
The patient is Watcher - Medium risk of patient condition declining or worsening    Shift Goals  Clinical Goals: Treat cellulitis with abx, IVF, manage DM.  Patient Goals: Pain management, sleep  Family Goals: vicente    Progress made toward(s) clinical / shift goals:    Problem: Respiratory  Goal: Patient will achieve/maintain optimum respiratory ventilation and gas exchange  Outcome: Progressing  Note: CPAP at night with episodes of apnea. 2L via NC during the day.        Patient is not progressing towards the following goals:      Problem: Pain - Standard  Goal: Alleviation of pain or a reduction in pain to the patient’s comfort goal  Outcome: Not Progressing

## 2023-06-10 NOTE — DIETARY
Nutrition Services: Diabetes Diet Education Consult   Day 2 of admit.  Christoph Taylor is a 58 y.o. male with admitting DX of Cellulitis [L03.90]    RD visited pt to provide diet education. Pt declined diet education at this time. RD left educational materials at bedside for pt to read. Encouraged pt to request RD re-consult if any questions arise during this admit. Pt with documented hx of multiple refusals of diet education with [facility] RDs over the past year.    Please re-consult RD as indicated.

## 2023-06-10 NOTE — PROGRESS NOTES
12-hour chart check complete.    Monitor Summary  Rhythm: SR  Rate: 60-80's  Ectopy: rPVC  Measurements: 0.18/0.08/0.42

## 2023-06-10 NOTE — PROGRESS NOTES
12-hour chart check complete.    Monitor Summary  Rhythm: SR  Rate: 72-96  Ectopy: R coup, frequent PVC/ R big/trigem  Measurements: .20/.08/.40

## 2023-06-10 NOTE — PROGRESS NOTES
Hospital Medicine Daily Progress Note    Date of Service  6/10/2023    Chief Complaint  Christoph Taylor is a 58 y.o. male admitted 6/8/2023 with abdominal pain    Hospital Course  Christoph Taylor is a 58 y.o. male with a past medical history of poorly controlled diabetes who presented 6/8/2023 with abdominal pain and right finger pain redness and swelling.  Patient reports that he has not been feeling well over the past 1 week and got much worse over the past 2 days.  Has been having progressively worsening abdominal pain.  The pain is rated as severe located mostly in the epigastric region.  He does have nausea but no diarrhea.  Pain is worse after eating.  He denies noticing any blood or mucus in his stool.   CT abd showed  Horseshoe kidney with mild hydronephrosis, possibly related to bladder distention. No obstructing stone seen; Features suggestive of enteritis; Patchy consolidations of the left lower lobe, likely infectious or inflammatory.    Patient was admitted to medical floor initially, transferred to ICU overnight for episodes of syncope and new development of DKA, with anion gap 27, elevated Ketone.  She was treated with insulin drip and IV fluid.  His BG improved, AG closed.  he was transferred out of ICU the next day.     Echo unremarkable with a EF of 65%    Troponin was slightly elevated and trending down, 29> 24, denies chest pain, EKG I personally reviewed,  no acute ischemic ST-T changes.  Likely demand ischemia in the setting of DKA    Phos 2.3, replaced  Urine drug screen positive for amphetamine, opioids, oxycodone    A1c 15.2    Interval Problem Update  Patient was seen and examined  Reported severe epigastric pain, nausea and vomiting, one bowel movement yesterday, denies blood in the stool or melena     transferred out of from ICU today    I downgraded diet to clear liquid given significant abdominal pain  I ordered oxycodone as needed  Continue Rocephin and Flagyl    Continue to  monitor BG, 120-160 today  Anion gap closed    I have discussed this patient's plan of care and discharge plan at IDT rounds today with Case Management, Nursing, Nursing leadership, and other members of the IDT team.    Consultants/Specialty  critical care    Code Status  Full Code    Disposition  The patient is not medically cleared for discharge to home or a post-acute facility.      I have placed the appropriate orders for post-discharge needs.    Review of Systems  All 12 systems were reviewed and negative except as mentioned above      Physical Exam  Temp:  [36.3 °C (97.3 °F)-36.7 °C (98 °F)] 36.7 °C (98 °F)  Pulse:  [67-89] 78  Resp:  [12-22] 18  BP: (100-153)/(61-94) 147/86  SpO2:  [97 %-100 %] 100 %    Physical Exam  Constitutional:       Appearance: He is ill-appearing.   HENT:      Head: Normocephalic.      Nose: Nose normal.      Mouth/Throat:      Mouth: Mucous membranes are moist.   Eyes:      Extraocular Movements: Extraocular movements intact.      Conjunctiva/sclera: Conjunctivae normal.   Cardiovascular:      Rate and Rhythm: Normal rate and regular rhythm.      Pulses: Normal pulses.      Heart sounds: Normal heart sounds.   Pulmonary:      Effort: Pulmonary effort is normal.      Breath sounds: Normal breath sounds. No wheezing or rales.   Abdominal:      General: Bowel sounds are normal.      Palpations: Abdomen is soft.      Tenderness: There is abdominal tenderness.   Musculoskeletal:         General: No swelling or tenderness. Normal range of motion.      Cervical back: Normal range of motion and neck supple.   Neurological:      Mental Status: He is alert and oriented to person, place, and time. Mental status is at baseline.   Psychiatric:         Mood and Affect: Mood normal.         Fluids    Intake/Output Summary (Last 24 hours) at 6/10/2023 1336  Last data filed at 6/10/2023 0900  Gross per 24 hour   Intake 209.02 ml   Output 600 ml   Net -390.98 ml       Laboratory  Recent Labs      "06/09/23  0036 06/09/23  0118 06/10/23  0151   WBC 16.1* 12.8* 11.3*   RBC 4.92 4.35* 3.96*   HEMOGLOBIN 13.9* 12.4* 11.3*   HEMATOCRIT 42.3 37.7* 33.7*   MCV 86.0 86.7 85.1   MCH 28.3 28.5 28.5   MCHC 32.9 32.9 33.5   RDW 39.2 39.6 39.4   PLATELETCT 308 255 226   MPV 9.2 9.3 9.1     Recent Labs     06/09/23  1000 06/09/23  1621 06/10/23  0151   SODIUM 136 136 136   POTASSIUM 3.9 4.4 3.6   CHLORIDE 103 102 101   CO2 26 27 27   GLUCOSE 284* 170* 134*   BUN 22 19 15   CREATININE 0.65 0.60 0.53   CALCIUM 7.7* 8.0* 7.6*                   Imaging  EC-ECHOCARDIOGRAM COMPLETE W/O CONT   Final Result      DX-CHEST-PORTABLE (1 VIEW)   Final Result         1.  Left basilar atelectasis, no focal infiltrate      CT-ABDOMEN-PELVIS WITH   Final Result      1.  Horseshoe kidney with mild hydronephrosis, possibly related to bladder distention. No obstructing stone seen.   2.  Small, nonobstructing renal stones.   3.  Features suggestive of enteritis. No evidence of obstruction.   4.  Patchy consolidations of the left lower lobe, likely infectious or inflammatory.           Assessment/Plan  * Cellulitis of finger of right hand- (present on admission)  Assessment & Plan  We will start ceftriaxone, follow on cultures and sensitivities    Enteritis  Assessment & Plan  Reported abdominal pain in epigastric area, nausea, denies vomiting or diarrhea, denies blood in stool or melena  CT abdomen consistent with enteritis    Continue Rocephin and Flagyl  Pain control  IV fluid    Elevated troponin  Assessment & Plan  Troponin was slightly elevated and trending down, 29> 24,   denies chest pain,   EKG I personally reviewed,  no acute ischemic ST-T changes.      Likely demand ischemia in the setting of DKA    Syncope  Assessment & Plan  Per note \"Rapid response was called for  losing consciousness and unresponsive for approximately 3 to 4 minutes\"  Likely due to hypovolemia and DKA    Echo unremarkable with a EF of 65%    Telemetry " monitor    Abdominal pain likely secondary to enterocolitis- (present on admission)  Assessment & Plan  I will check CT scan of the abdomen for further evaluation  I will start ceftriaxone and metronidazole, follow on cultures and sensitivities  I will start a modified clear liquid diet, advance as tolerated    Dyslipidemia- (present on admission)  Assessment & Plan  Cardiac diet when able to tolerate orally    Uncontrolled type 2 diabetes mellitus with hyperglycemia (HCC)- (present on admission)  Assessment & Plan  With marked hyperglycemia  Last glycated hemoglobin was 15.2% indicating a very poor control  I will start Premeal, long & short acting insulin for now  Accu-Checks, hypoglycemia protocol  Adjust according to blood sugars trend     6/10 continue Lantus at 10 units twice daily, SSI  BG today 120-160    Metabolic acidosis, increased anion gap- (present on admission)  Assessment & Plan  Likely multifactorial due to reduced intravascular volume/hyperglycemia/dehydration, infection/colitis  Anticipate improvement with intravenous fluids and initiating insulin, continue to monitor    Diabetic ketoacidosis without coma (HCC)  Assessment & Plan  Patient was admitted to medical floor initially, transferred to ICU overnight for episodes of syncope and new development of DKA, with anion gap 27, elevated Ketone.  She was treated with insulin drip and IV fluid.  His BG improved, AG closed.  he was transferred out of ICU today    Likely due to underlying infection  Continue insulin and close monitor         VTE prophylaxis: enoxaparin ppx    I have performed a physical exam and reviewed and updated ROS and Plan today (6/10/2023). In review of yesterday's note (6/9/2023), there are no changes except as documented above.    Total time:  52 minutes.  I spent greater than 50% of the time for patient care, counseling, and coordination on this date, including unit/floor time, and face-to-face time with the patient as per  interval events and assessment and plan above

## 2023-06-10 NOTE — ASSESSMENT & PLAN NOTE
Troponin was slightly elevated and trending down, 29> 24,   denies chest pain,   EKG I personally reviewed,  no acute ischemic ST-T changes.      Likely demand ischemia in the setting of DKA

## 2023-06-10 NOTE — CARE PLAN
The patient is Watcher - Medium risk of patient condition declining or worsening    Shift Goals  Clinical Goals: Pain management, monitor glucose  Patient Goals: pain control, sleep  Family Goals: vicente    Progress made toward(s) clinical / shift goals:    Problem: Pain - Standard  Goal: Alleviation of pain or a reduction in pain to the patient’s comfort goal  Outcome: Progressing     Problem: Knowledge Deficit - Standard  Goal: Patient and family/care givers will demonstrate understanding of plan of care, disease process/condition, diagnostic tests and medications  Outcome: Progressing     Problem: Hemodynamics  Goal: Patient's hemodynamics, fluid balance and neurologic status will be stable or improve  Outcome: Progressing     Problem: Respiratory  Goal: Patient will achieve/maintain optimum respiratory ventilation and gas exchange  Outcome: Progressing       Patient is not progressing towards the following goals:      Problem: Fall Risk  Goal: Patient will remain free from falls  Outcome: Not Progressing     Problem: Skin Integrity  Goal: Skin integrity is maintained or improved  Outcome: Not Progressing

## 2023-06-11 ENCOUNTER — APPOINTMENT (OUTPATIENT)
Dept: RADIOLOGY | Facility: MEDICAL CENTER | Age: 58
DRG: 628 | End: 2023-06-11
Attending: STUDENT IN AN ORGANIZED HEALTH CARE EDUCATION/TRAINING PROGRAM
Payer: COMMERCIAL

## 2023-06-11 LAB
ANION GAP SERPL CALC-SCNC: 9 MMOL/L (ref 7–16)
BUN SERPL-MCNC: 8 MG/DL (ref 8–22)
CALCIUM SERPL-MCNC: 7.7 MG/DL (ref 8.4–10.2)
CHLORIDE SERPL-SCNC: 98 MMOL/L (ref 96–112)
CO2 SERPL-SCNC: 29 MMOL/L (ref 20–33)
CREAT SERPL-MCNC: 0.41 MG/DL (ref 0.5–1.4)
ERYTHROCYTE [DISTWIDTH] IN BLOOD BY AUTOMATED COUNT: 38.6 FL (ref 35.9–50)
GFR SERPLBLD CREATININE-BSD FMLA CKD-EPI: 125 ML/MIN/1.73 M 2
GLUCOSE BLD STRIP.AUTO-MCNC: 126 MG/DL (ref 65–99)
GLUCOSE BLD STRIP.AUTO-MCNC: 142 MG/DL (ref 65–99)
GLUCOSE BLD STRIP.AUTO-MCNC: 225 MG/DL (ref 65–99)
GLUCOSE BLD STRIP.AUTO-MCNC: 234 MG/DL (ref 65–99)
GLUCOSE BLD STRIP.AUTO-MCNC: 267 MG/DL (ref 65–99)
GLUCOSE BLD STRIP.AUTO-MCNC: 67 MG/DL (ref 65–99)
GLUCOSE BLD STRIP.AUTO-MCNC: 79 MG/DL (ref 65–99)
GLUCOSE SERPL-MCNC: 91 MG/DL (ref 65–99)
HCT VFR BLD AUTO: 33.2 % (ref 42–52)
HGB BLD-MCNC: 11.1 G/DL (ref 14–18)
MAGNESIUM SERPL-MCNC: 1.5 MG/DL (ref 1.5–2.5)
MCH RBC QN AUTO: 28.1 PG (ref 27–33)
MCHC RBC AUTO-ENTMCNC: 33.4 G/DL (ref 32.3–36.5)
MCV RBC AUTO: 84.1 FL (ref 81.4–97.8)
PHOSPHATE SERPL-MCNC: 2.6 MG/DL (ref 2.5–4.5)
PLATELET # BLD AUTO: 194 K/UL (ref 164–446)
PMV BLD AUTO: 8.7 FL (ref 9–12.9)
POTASSIUM SERPL-SCNC: 3.2 MMOL/L (ref 3.6–5.5)
RBC # BLD AUTO: 3.95 M/UL (ref 4.7–6.1)
SODIUM SERPL-SCNC: 136 MMOL/L (ref 135–145)
WBC # BLD AUTO: 8.1 K/UL (ref 4.8–10.8)

## 2023-06-11 PROCEDURE — 770020 HCHG ROOM/CARE - TELE (206)

## 2023-06-11 PROCEDURE — 73201 CT UPPER EXTREMITY W/DYE: CPT | Mod: RT

## 2023-06-11 PROCEDURE — 84100 ASSAY OF PHOSPHORUS: CPT

## 2023-06-11 PROCEDURE — 700111 HCHG RX REV CODE 636 W/ 250 OVERRIDE (IP): Performed by: STUDENT IN AN ORGANIZED HEALTH CARE EDUCATION/TRAINING PROGRAM

## 2023-06-11 PROCEDURE — 700117 HCHG RX CONTRAST REV CODE 255: Performed by: STUDENT IN AN ORGANIZED HEALTH CARE EDUCATION/TRAINING PROGRAM

## 2023-06-11 PROCEDURE — A9270 NON-COVERED ITEM OR SERVICE: HCPCS | Performed by: STUDENT IN AN ORGANIZED HEALTH CARE EDUCATION/TRAINING PROGRAM

## 2023-06-11 PROCEDURE — 94760 N-INVAS EAR/PLS OXIMETRY 1: CPT

## 2023-06-11 PROCEDURE — 82962 GLUCOSE BLOOD TEST: CPT | Mod: 91

## 2023-06-11 PROCEDURE — A9270 NON-COVERED ITEM OR SERVICE: HCPCS | Performed by: HOSPITALIST

## 2023-06-11 PROCEDURE — 700102 HCHG RX REV CODE 250 W/ 637 OVERRIDE(OP): Performed by: STUDENT IN AN ORGANIZED HEALTH CARE EDUCATION/TRAINING PROGRAM

## 2023-06-11 PROCEDURE — 99232 SBSQ HOSP IP/OBS MODERATE 35: CPT | Performed by: STUDENT IN AN ORGANIZED HEALTH CARE EDUCATION/TRAINING PROGRAM

## 2023-06-11 PROCEDURE — 700111 HCHG RX REV CODE 636 W/ 250 OVERRIDE (IP): Performed by: HOSPITALIST

## 2023-06-11 PROCEDURE — 87641 MR-STAPH DNA AMP PROBE: CPT

## 2023-06-11 PROCEDURE — 80048 BASIC METABOLIC PNL TOTAL CA: CPT

## 2023-06-11 PROCEDURE — 85027 COMPLETE CBC AUTOMATED: CPT

## 2023-06-11 PROCEDURE — 700102 HCHG RX REV CODE 250 W/ 637 OVERRIDE(OP): Performed by: HOSPITALIST

## 2023-06-11 PROCEDURE — 94660 CPAP INITIATION&MGMT: CPT

## 2023-06-11 PROCEDURE — 36415 COLL VENOUS BLD VENIPUNCTURE: CPT

## 2023-06-11 PROCEDURE — 700105 HCHG RX REV CODE 258: Performed by: STUDENT IN AN ORGANIZED HEALTH CARE EDUCATION/TRAINING PROGRAM

## 2023-06-11 PROCEDURE — 83735 ASSAY OF MAGNESIUM: CPT

## 2023-06-11 RX ORDER — DOXYCYCLINE 100 MG/1
100 TABLET ORAL EVERY 12 HOURS
Status: DISCONTINUED | OUTPATIENT
Start: 2023-06-11 | End: 2023-06-11

## 2023-06-11 RX ORDER — DOXYCYCLINE 100 MG/1
100 TABLET ORAL EVERY 12 HOURS
Status: DISCONTINUED | OUTPATIENT
Start: 2023-06-11 | End: 2023-06-15

## 2023-06-11 RX ORDER — POTASSIUM CHLORIDE 20 MEQ/1
40 TABLET, EXTENDED RELEASE ORAL ONCE
Status: COMPLETED | OUTPATIENT
Start: 2023-06-11 | End: 2023-06-11

## 2023-06-11 RX ADMIN — AMPICILLIN SODIUM AND SULBACTAM SODIUM 3 G: 2; 1 INJECTION, POWDER, FOR SOLUTION INTRAMUSCULAR; INTRAVENOUS at 18:20

## 2023-06-11 RX ADMIN — INSULIN LISPRO 7 UNITS: 100 INJECTION, SOLUTION INTRAVENOUS; SUBCUTANEOUS at 12:49

## 2023-06-11 RX ADMIN — IOHEXOL 75 ML: 350 INJECTION, SOLUTION INTRAVENOUS at 13:32

## 2023-06-11 RX ADMIN — INSULIN GLARGINE-YFGN 10 UNITS: 100 INJECTION, SOLUTION SUBCUTANEOUS at 18:14

## 2023-06-11 RX ADMIN — OXYCODONE HYDROCHLORIDE 10 MG: 10 TABLET ORAL at 13:00

## 2023-06-11 RX ADMIN — AMPICILLIN SODIUM AND SULBACTAM SODIUM 3 G: 2; 1 INJECTION, POWDER, FOR SOLUTION INTRAMUSCULAR; INTRAVENOUS at 13:43

## 2023-06-11 RX ADMIN — METRONIDAZOLE 500 MG: 500 TABLET ORAL at 05:31

## 2023-06-11 RX ADMIN — ENOXAPARIN SODIUM 40 MG: 100 INJECTION SUBCUTANEOUS at 18:14

## 2023-06-11 RX ADMIN — OXYCODONE HYDROCHLORIDE 10 MG: 10 TABLET ORAL at 20:38

## 2023-06-11 RX ADMIN — POTASSIUM CHLORIDE 40 MEQ: 1500 TABLET, EXTENDED RELEASE ORAL at 08:25

## 2023-06-11 RX ADMIN — SENNOSIDES AND DOCUSATE SODIUM 2 TABLET: 50; 8.6 TABLET ORAL at 05:31

## 2023-06-11 RX ADMIN — DOXYCYCLINE 100 MG: 100 TABLET, FILM COATED ORAL at 16:38

## 2023-06-11 RX ADMIN — SENNOSIDES AND DOCUSATE SODIUM 2 TABLET: 50; 8.6 TABLET ORAL at 18:14

## 2023-06-11 RX ADMIN — OXYCODONE 5 MG: 5 TABLET ORAL at 00:21

## 2023-06-11 RX ADMIN — INSULIN LISPRO 4 UNITS: 100 INJECTION, SOLUTION INTRAVENOUS; SUBCUTANEOUS at 20:34

## 2023-06-11 ASSESSMENT — PAIN SCALES - WONG BAKER
WONGBAKER_NUMERICALRESPONSE: HURTS A LITTLE MORE
WONGBAKER_NUMERICALRESPONSE: HURTS A LITTLE MORE

## 2023-06-11 ASSESSMENT — FIBROSIS 4 INDEX: FIB4 SCORE: 0.83

## 2023-06-11 ASSESSMENT — PAIN DESCRIPTION - PAIN TYPE: TYPE: ACUTE PAIN

## 2023-06-11 NOTE — CARE PLAN
The patient is Stable - Low risk of patient condition declining or worsening    Shift Goals  Clinical Goals: Glucose Monitoring, Imaging  Patient Goals: Pain Control  Family Goals: KATHERINE    Progress made toward(s) clinical / shift goals:      Patient is able to verbalize understanding of plan of care and current hospitalization.Patient is able to verbalize pain score using numeric pain scale. Patient awaiting MRI for right thumb.     Problem: Skin Integrity  Goal: Skin integrity is maintained or improved  Outcome: Progressing     Problem: Knowledge Deficit - Standard  Goal: Patient and family/care givers will demonstrate understanding of plan of care, disease process/condition, diagnostic tests and medications  Outcome: Progressing     Problem: Pain - Standard  Goal: Alleviation of pain or a reduction in pain to the patient’s comfort goal  Outcome: Progressing

## 2023-06-11 NOTE — PROGRESS NOTES
1852 received bedside report and assumed patient care. Patient resting with unlabored breathing.     1956 patient assessment completed. Patient discussed concerns for the appearence of their right thumb not changing and request that the hospitalist re-exam the thumb during day shift. Patient's questions and needs addressed at this time.

## 2023-06-11 NOTE — PROGRESS NOTES
Hypoglycemia Intervention    Hypoglycemia protocol intervention:  Blood glucose 67 at 0556.  Intervention: 8 oz of fruit juice   Repeat blood glucose 79 at 0624.  Intervention: 4 oz of fruit juice   Additional interventions needed: Blood glucose 126 at 0649. No additional interventions. Day shift RN will recheck in one hour.   Dr. Macdonald notified of the above at 0602.

## 2023-06-11 NOTE — CARE PLAN
The patient is Stable - Low risk of patient condition declining or worsening    Shift Goals  Clinical Goals: IV antibiotics  Patient Goals: manage pain  Family Goals: vicente    Progress made toward(s) clinical / shift goals:    Problem: Knowledge Deficit - Standard  Goal: Patient and family/care givers will demonstrate understanding of plan of care, disease process/condition, diagnostic tests and medications  Outcome: Progressing  Note: Patient actively engaged in plan of care and asked questions appropriately. Questions encouraged.      Problem: Fall Risk  Goal: Patient will remain free from falls  Outcome: Progressing       Patient is not progressing towards the following goals:

## 2023-06-11 NOTE — PROGRESS NOTES
Hospital Medicine Daily Progress Note    Date of Service  6/11/2023    Chief Complaint  Christoph Taylor is a 58 y.o. male admitted 6/8/2023 with abdominal pain    Hospital Course  Christoph Taylor is a 58 y.o. male with a past medical history of poorly controlled diabetes who presented 6/8/2023 with abdominal pain and right finger pain redness and swelling.  Patient reports that he has not been feeling well over the past 1 week and got much worse over the past 2 days.  Has been having progressively worsening abdominal pain.  The pain is rated as severe located mostly in the epigastric region.  He does have nausea but no diarrhea.  Pain is worse after eating.  He denies noticing any blood or mucus in his stool.   CT abd showed  Horseshoe kidney with mild hydronephrosis, possibly related to bladder distention. No obstructing stone seen; Features suggestive of enteritis; Patchy consolidations of the left lower lobe, likely infectious or inflammatory.    Patient was admitted to medical floor initially, transferred to ICU overnight for episodes of syncope and new development of DKA, with anion gap 27, elevated Ketone.  She was treated with insulin drip and IV fluid.  His BG improved, AG closed.  he was transferred out of ICU the next day.     Echo unremarkable with a EF of 65%    Troponin was slightly elevated and trending down, 29> 24, denies chest pain, EKG I personally reviewed,  no acute ischemic ST-T changes.  Likely demand ischemia in the setting of DKA    Phos 2.3, replaced  Urine drug screen positive for amphetamine, opioids, oxycodone    A1c 15.2    Interval Problem Update  Patient was seen and examined    Reported worsening R thrumb swelling and tenderness  I ordered a CT with contrast to rule out abscess  Started Unasyn and doxycycline.  Completed 5 days of rocephin and flagyl for enteritis today    Reported abdominal pain is better, want to advance the diet.  Started GI soft, may downgrade to the floor  patient developed GI symptoms    Continue to monitor BG    I have discussed this patient's plan of care and discharge plan at IDT rounds today with Case Management, Nursing, Nursing leadership, and other members of the IDT team.    Consultants/Specialty  critical care    Code Status  Full Code    Disposition  Medically Cleared  I have placed the appropriate orders for post-discharge needs.    Review of Systems  All 12 systems were reviewed and negative except as mentioned above      Physical Exam  Temp:  [36.4 °C (97.6 °F)-37 °C (98.6 °F)] 36.7 °C (98 °F)  Pulse:  [69-85] 81  Resp:  [16-18] 18  BP: (107-144)/(58-84) 114/66  SpO2:  [92 %-99 %] 92 %    Physical Exam  Constitutional:       Appearance: He is ill-appearing.   HENT:      Head: Normocephalic.      Nose: Nose normal.      Mouth/Throat:      Mouth: Mucous membranes are moist.   Eyes:      Extraocular Movements: Extraocular movements intact.      Conjunctiva/sclera: Conjunctivae normal.   Cardiovascular:      Rate and Rhythm: Normal rate and regular rhythm.      Pulses: Normal pulses.      Heart sounds: Normal heart sounds.   Pulmonary:      Effort: Pulmonary effort is normal.      Breath sounds: Normal breath sounds. No wheezing or rales.   Abdominal:      General: Bowel sounds are normal.      Palpations: Abdomen is soft.      Tenderness: There is abdominal tenderness.   Musculoskeletal:         General: No swelling or tenderness. Normal range of motion.      Cervical back: Normal range of motion and neck supple.   Neurological:      Mental Status: He is alert and oriented to person, place, and time. Mental status is at baseline.   Psychiatric:         Mood and Affect: Mood normal.         Fluids    Intake/Output Summary (Last 24 hours) at 6/11/2023 1207  Last data filed at 6/11/2023 0900  Gross per 24 hour   Intake 120 ml   Output 3200 ml   Net -3080 ml       Laboratory  Recent Labs     06/09/23  0118 06/10/23  0151 06/11/23  0115   WBC 12.8* 11.3* 8.1    RBC 4.35* 3.96* 3.95*   HEMOGLOBIN 12.4* 11.3* 11.1*   HEMATOCRIT 37.7* 33.7* 33.2*   MCV 86.7 85.1 84.1   MCH 28.5 28.5 28.1   MCHC 32.9 33.5 33.4   RDW 39.6 39.4 38.6   PLATELETCT 255 226 194   MPV 9.3 9.1 8.7*     Recent Labs     06/09/23  1621 06/10/23  0151 06/11/23  0115   SODIUM 136 136 136   POTASSIUM 4.4 3.6 3.2*   CHLORIDE 102 101 98   CO2 27 27 29   GLUCOSE 170* 134* 91   BUN 19 15 8   CREATININE 0.60 0.53 0.41*   CALCIUM 8.0* 7.6* 7.7*                   Imaging  EC-ECHOCARDIOGRAM COMPLETE W/O CONT   Final Result      DX-CHEST-PORTABLE (1 VIEW)   Final Result         1.  Left basilar atelectasis, no focal infiltrate      CT-ABDOMEN-PELVIS WITH   Final Result      1.  Horseshoe kidney with mild hydronephrosis, possibly related to bladder distention. No obstructing stone seen.   2.  Small, nonobstructing renal stones.   3.  Features suggestive of enteritis. No evidence of obstruction.   4.  Patchy consolidations of the left lower lobe, likely infectious or inflammatory.      CT-HAND WITH RIGHT    (Results Pending)        Assessment/Plan  * Cellulitis of finger of right hand- (present on admission)  Assessment & Plan  Received 5 days of rocephin and flagyl for enteritis by 6/11 6/11 Reported worsening R thrumb swelling and tenderness  I ordered a CT with contrast to rule out abscess  Started Unasyn and doxycycline    Enteritis  Assessment & Plan  Reported abdominal pain in epigastric area, nausea, denies vomiting or diarrhea, denies blood in stool or melena  CT abdomen consistent with enteritis    Continue Rocephin and Flagyl  Pain control  IV fluid    6/11 Reported abdominal pain is better, want to advance the diet.  Started GI soft, may downgrade to the floor patient developed GI symptoms    Elevated troponin  Assessment & Plan  Troponin was slightly elevated and trending down, 29> 24,   denies chest pain,   EKG I personally reviewed,  no acute ischemic ST-T changes.      Likely demand ischemia in the  "setting of DKA    Syncope  Assessment & Plan  Per note \"Rapid response was called for  losing consciousness and unresponsive for approximately 3 to 4 minutes\"  Likely due to hypovolemia and DKA    Echo unremarkable with a EF of 65%    Telemetry monitor    Abdominal pain likely secondary to enterocolitis- (present on admission)  Assessment & Plan  I will check CT scan of the abdomen for further evaluation  I will start ceftriaxone and metronidazole, follow on cultures and sensitivities  I will start a modified clear liquid diet, advance as tolerated    Dyslipidemia- (present on admission)  Assessment & Plan  Cardiac diet when able to tolerate orally    Uncontrolled type 2 diabetes mellitus with hyperglycemia (HCC)- (present on admission)  Assessment & Plan  With marked hyperglycemia  Last glycated hemoglobin was 15.2% indicating a very poor control  I will start Premeal, long & short acting insulin for now  Accu-Checks, hypoglycemia protocol  Adjust according to blood sugars trend     6/10 continue Lantus at 10 units twice daily, SSI  BG today 120-160    Metabolic acidosis, increased anion gap- (present on admission)  Assessment & Plan  Likely multifactorial due to reduced intravascular volume/hyperglycemia/dehydration, infection/colitis  Anticipate improvement with intravenous fluids and initiating insulin, continue to monitor    Diabetic ketoacidosis without coma (HCC)  Assessment & Plan  Patient was admitted to medical floor initially, transferred to ICU overnight for episodes of syncope and new development of DKA, with anion gap 27, elevated Ketone.  She was treated with insulin drip and IV fluid.  His BG improved, AG closed.  he was transferred out of ICU today    Likely due to underlying infection  Continue insulin and close monitor         VTE prophylaxis: enoxaparin ppx    I have performed a physical exam and reviewed and updated ROS and Plan today (6/11/2023). In review of yesterday's note (6/10/2023), there " are no changes except as documented above.    Total time:  52 minutes.  I spent greater than 50% of the time for patient care, counseling, and coordination on this date, including unit/floor time, and face-to-face time with the patient as per interval events and assessment and plan above      ROS

## 2023-06-11 NOTE — PROGRESS NOTES
4 Eyes Skin Assessment Completed by PENNIE Baum and PENNIE Mckinney.    Head WDL  Ears WDL  Nose WDL  Mouth WDL  Neck WDL  Breast/Chest WDL  Shoulder Blades Redness and Blanching  Spine Redness, Blanching, and Bruising  (R) Arm/Elbow/Hand Redness, Blanching, Scab, and Swelling  (L) Arm/Elbow/Hand Redness and Scab  Abdomen WDL  Groin WDL  Scrotum/Coccyx/Buttocks Redness, Blanching, and Discoloration (bruising)  (R) Leg Scab  (L) Leg Scab  (R) Heel/Foot/Toe Scab, callous  (L) Heel/Foot/Toe Scab, callous          Devices In Places Tele Box, Pulse Ox, and Nasal Cannula      Interventions In Place Gray Ear Foams    Possible Skin Injury Yes    Pictures Uploaded Into Epic No, needs to be completed  Wound Consult Placed N/A  RN Wound Prevention Protocol Ordered No

## 2023-06-11 NOTE — PROGRESS NOTES
Telemetry Shift Summary    Rhythm SR  HR Range 65-82  Ectopy rPAC, rPVC  Measurements 0.16/0.08/0.40        Normal Values  Rhythm SR  HR Range    Measurements 0.12-0.20 / 0.06-0.10  / 0.30-0.52

## 2023-06-11 NOTE — THERAPY
06/11/23 1316   Initial Contact Note    Initial Contact Note Order Received and Verified, Physical Therapy Evaluation in Progress with Full Report to Follow.   Precautions   Precautions Fall Risk   Comments fell during this hosptial admission   Interdisciplinary Plan of Care Collaboration   IDT Collaboration with  Nursing;Certified Nursing Assistant   Patient Position at End of Therapy In Bed   Collaboration Comments attempted eval, pt incontinent of BM while in bed. pt states since accident and t12 injury he occassionally has trouble with bowel control. Nsg informed. Will reattempt as able.   Session Information   Date / Session Number  6/11/23 Attempted eval. Pt needing linen change. will reattempt as able.

## 2023-06-12 ENCOUNTER — ANESTHESIA (OUTPATIENT)
Dept: SURGERY | Facility: MEDICAL CENTER | Age: 58
DRG: 628 | End: 2023-06-12
Payer: COMMERCIAL

## 2023-06-12 ENCOUNTER — ANESTHESIA EVENT (OUTPATIENT)
Dept: SURGERY | Facility: MEDICAL CENTER | Age: 58
DRG: 628 | End: 2023-06-12
Payer: COMMERCIAL

## 2023-06-12 ENCOUNTER — APPOINTMENT (OUTPATIENT)
Dept: RADIOLOGY | Facility: MEDICAL CENTER | Age: 58
DRG: 628 | End: 2023-06-12
Attending: STUDENT IN AN ORGANIZED HEALTH CARE EDUCATION/TRAINING PROGRAM
Payer: COMMERCIAL

## 2023-06-12 PROBLEM — M86.9 OSTEOMYELITIS OF RIGHT HAND (HCC): Status: ACTIVE | Noted: 2023-06-08

## 2023-06-12 LAB
ANION GAP SERPL CALC-SCNC: 5 MMOL/L (ref 7–16)
BUN SERPL-MCNC: 8 MG/DL (ref 8–22)
CALCIUM SERPL-MCNC: 7.7 MG/DL (ref 8.4–10.2)
CHLORIDE SERPL-SCNC: 97 MMOL/L (ref 96–112)
CO2 SERPL-SCNC: 31 MMOL/L (ref 20–33)
CREAT SERPL-MCNC: 0.49 MG/DL (ref 0.5–1.4)
CRP SERPL HS-MCNC: 1.89 MG/DL (ref 0–0.75)
ERYTHROCYTE [DISTWIDTH] IN BLOOD BY AUTOMATED COUNT: 39.5 FL (ref 35.9–50)
ERYTHROCYTE [SEDIMENTATION RATE] IN BLOOD BY WESTERGREN METHOD: 20 MM/HOUR (ref 0–20)
GFR SERPLBLD CREATININE-BSD FMLA CKD-EPI: 118 ML/MIN/1.73 M 2
GLUCOSE BLD STRIP.AUTO-MCNC: 101 MG/DL (ref 65–99)
GLUCOSE BLD STRIP.AUTO-MCNC: 115 MG/DL (ref 65–99)
GLUCOSE BLD STRIP.AUTO-MCNC: 138 MG/DL (ref 65–99)
GLUCOSE BLD STRIP.AUTO-MCNC: 147 MG/DL (ref 65–99)
GLUCOSE BLD STRIP.AUTO-MCNC: 191 MG/DL (ref 65–99)
GLUCOSE SERPL-MCNC: 176 MG/DL (ref 65–99)
HCT VFR BLD AUTO: 32.4 % (ref 42–52)
HGB BLD-MCNC: 10.5 G/DL (ref 14–18)
LACTATE SERPL-SCNC: 0.7 MMOL/L (ref 0.5–2)
MCH RBC QN AUTO: 27.9 PG (ref 27–33)
MCHC RBC AUTO-ENTMCNC: 32.4 G/DL (ref 32.3–36.5)
MCV RBC AUTO: 85.9 FL (ref 81.4–97.8)
PATHOLOGY CONSULT NOTE: NORMAL
PLATELET # BLD AUTO: 193 K/UL (ref 164–446)
PMV BLD AUTO: 9.1 FL (ref 9–12.9)
POTASSIUM SERPL-SCNC: 3.7 MMOL/L (ref 3.6–5.5)
RBC # BLD AUTO: 3.77 M/UL (ref 4.7–6.1)
SCCMEC + MECA PNL NOSE NAA+PROBE: NEGATIVE
SODIUM SERPL-SCNC: 133 MMOL/L (ref 135–145)
WBC # BLD AUTO: 7.2 K/UL (ref 4.8–10.8)

## 2023-06-12 PROCEDURE — 700117 HCHG RX CONTRAST REV CODE 255: Performed by: STUDENT IN AN ORGANIZED HEALTH CARE EDUCATION/TRAINING PROGRAM

## 2023-06-12 PROCEDURE — 700105 HCHG RX REV CODE 258: Performed by: STUDENT IN AN ORGANIZED HEALTH CARE EDUCATION/TRAINING PROGRAM

## 2023-06-12 PROCEDURE — A9579 GAD-BASE MR CONTRAST NOS,1ML: HCPCS | Performed by: STUDENT IN AN ORGANIZED HEALTH CARE EDUCATION/TRAINING PROGRAM

## 2023-06-12 PROCEDURE — 87147 CULTURE TYPE IMMUNOLOGIC: CPT

## 2023-06-12 PROCEDURE — 87077 CULTURE AEROBIC IDENTIFY: CPT | Mod: 91

## 2023-06-12 PROCEDURE — 87205 SMEAR GRAM STAIN: CPT

## 2023-06-12 PROCEDURE — 87075 CULTR BACTERIA EXCEPT BLOOD: CPT

## 2023-06-12 PROCEDURE — 85652 RBC SED RATE AUTOMATED: CPT

## 2023-06-12 PROCEDURE — 700111 HCHG RX REV CODE 636 W/ 250 OVERRIDE (IP): Performed by: STUDENT IN AN ORGANIZED HEALTH CARE EDUCATION/TRAINING PROGRAM

## 2023-06-12 PROCEDURE — 160035 HCHG PACU - 1ST 60 MINS PHASE I: Performed by: ORTHOPAEDIC SURGERY

## 2023-06-12 PROCEDURE — 700102 HCHG RX REV CODE 250 W/ 637 OVERRIDE(OP): Performed by: STUDENT IN AN ORGANIZED HEALTH CARE EDUCATION/TRAINING PROGRAM

## 2023-06-12 PROCEDURE — 700102 HCHG RX REV CODE 250 W/ 637 OVERRIDE(OP): Performed by: HOSPITALIST

## 2023-06-12 PROCEDURE — 00400 ANES INTEGUMENTARY SYS NOS: CPT | Performed by: STUDENT IN AN ORGANIZED HEALTH CARE EDUCATION/TRAINING PROGRAM

## 2023-06-12 PROCEDURE — 73220 MRI UPPR EXTREMITY W/O&W/DYE: CPT | Mod: RT

## 2023-06-12 PROCEDURE — 94660 CPAP INITIATION&MGMT: CPT

## 2023-06-12 PROCEDURE — 160027 HCHG SURGERY MINUTES - 1ST 30 MINS LEVEL 2: Performed by: ORTHOPAEDIC SURGERY

## 2023-06-12 PROCEDURE — 36415 COLL VENOUS BLD VENIPUNCTURE: CPT

## 2023-06-12 PROCEDURE — 97162 PT EVAL MOD COMPLEX 30 MIN: CPT

## 2023-06-12 PROCEDURE — 82962 GLUCOSE BLOOD TEST: CPT | Mod: 91

## 2023-06-12 PROCEDURE — 770020 HCHG ROOM/CARE - TELE (206)

## 2023-06-12 PROCEDURE — 88304 TISSUE EXAM BY PATHOLOGIST: CPT

## 2023-06-12 PROCEDURE — 97166 OT EVAL MOD COMPLEX 45 MIN: CPT

## 2023-06-12 PROCEDURE — A9270 NON-COVERED ITEM OR SERVICE: HCPCS | Performed by: HOSPITALIST

## 2023-06-12 PROCEDURE — 86140 C-REACTIVE PROTEIN: CPT

## 2023-06-12 PROCEDURE — 83605 ASSAY OF LACTIC ACID: CPT

## 2023-06-12 PROCEDURE — 99232 SBSQ HOSP IP/OBS MODERATE 35: CPT | Performed by: STUDENT IN AN ORGANIZED HEALTH CARE EDUCATION/TRAINING PROGRAM

## 2023-06-12 PROCEDURE — 700111 HCHG RX REV CODE 636 W/ 250 OVERRIDE (IP): Performed by: ORTHOPAEDIC SURGERY

## 2023-06-12 PROCEDURE — 80048 BASIC METABOLIC PNL TOTAL CA: CPT

## 2023-06-12 PROCEDURE — 0PBR0ZZ EXCISION OF RIGHT THUMB PHALANX, OPEN APPROACH: ICD-10-PCS | Performed by: ORTHOPAEDIC SURGERY

## 2023-06-12 PROCEDURE — 87070 CULTURE OTHR SPECIMN AEROBIC: CPT

## 2023-06-12 PROCEDURE — 87186 SC STD MICRODIL/AGAR DIL: CPT | Mod: 91

## 2023-06-12 PROCEDURE — 160009 HCHG ANES TIME/MIN: Performed by: ORTHOPAEDIC SURGERY

## 2023-06-12 PROCEDURE — 85027 COMPLETE CBC AUTOMATED: CPT

## 2023-06-12 PROCEDURE — 160048 HCHG OR STATISTICAL LEVEL 1-5: Performed by: ORTHOPAEDIC SURGERY

## 2023-06-12 PROCEDURE — 94760 N-INVAS EAR/PLS OXIMETRY 1: CPT

## 2023-06-12 PROCEDURE — A9270 NON-COVERED ITEM OR SERVICE: HCPCS | Performed by: STUDENT IN AN ORGANIZED HEALTH CARE EDUCATION/TRAINING PROGRAM

## 2023-06-12 PROCEDURE — 160002 HCHG RECOVERY MINUTES (STAT): Performed by: ORTHOPAEDIC SURGERY

## 2023-06-12 RX ORDER — ONDANSETRON 2 MG/ML
4 INJECTION INTRAMUSCULAR; INTRAVENOUS
Status: DISCONTINUED | OUTPATIENT
Start: 2023-06-12 | End: 2023-06-12 | Stop reason: HOSPADM

## 2023-06-12 RX ORDER — HYDROMORPHONE HYDROCHLORIDE 1 MG/ML
0.4 INJECTION, SOLUTION INTRAMUSCULAR; INTRAVENOUS; SUBCUTANEOUS
Status: DISCONTINUED | OUTPATIENT
Start: 2023-06-12 | End: 2023-06-12 | Stop reason: HOSPADM

## 2023-06-12 RX ORDER — CEFAZOLIN SODIUM 1 G/3ML
INJECTION, POWDER, FOR SOLUTION INTRAMUSCULAR; INTRAVENOUS PRN
Status: DISCONTINUED | OUTPATIENT
Start: 2023-06-12 | End: 2023-06-12 | Stop reason: SURG

## 2023-06-12 RX ORDER — OXYCODONE HCL 5 MG/5 ML
10 SOLUTION, ORAL ORAL
Status: DISCONTINUED | OUTPATIENT
Start: 2023-06-12 | End: 2023-06-12 | Stop reason: HOSPADM

## 2023-06-12 RX ORDER — SODIUM CHLORIDE, SODIUM LACTATE, POTASSIUM CHLORIDE, CALCIUM CHLORIDE 600; 310; 30; 20 MG/100ML; MG/100ML; MG/100ML; MG/100ML
INJECTION, SOLUTION INTRAVENOUS CONTINUOUS
Status: ACTIVE | OUTPATIENT
Start: 2023-06-12 | End: 2023-06-12

## 2023-06-12 RX ORDER — DIPHENHYDRAMINE HYDROCHLORIDE 50 MG/ML
12.5 INJECTION INTRAMUSCULAR; INTRAVENOUS
Status: DISCONTINUED | OUTPATIENT
Start: 2023-06-12 | End: 2023-06-12 | Stop reason: HOSPADM

## 2023-06-12 RX ORDER — SODIUM CHLORIDE, SODIUM LACTATE, POTASSIUM CHLORIDE, CALCIUM CHLORIDE 600; 310; 30; 20 MG/100ML; MG/100ML; MG/100ML; MG/100ML
INJECTION, SOLUTION INTRAVENOUS CONTINUOUS
Status: DISCONTINUED | OUTPATIENT
Start: 2023-06-12 | End: 2023-06-12 | Stop reason: HOSPADM

## 2023-06-12 RX ORDER — BUPIVACAINE HYDROCHLORIDE 2.5 MG/ML
INJECTION, SOLUTION EPIDURAL; INFILTRATION; INTRACAUDAL
Status: DISCONTINUED | OUTPATIENT
Start: 2023-06-12 | End: 2023-06-12 | Stop reason: HOSPADM

## 2023-06-12 RX ORDER — OXYCODONE HCL 5 MG/5 ML
5 SOLUTION, ORAL ORAL
Status: DISCONTINUED | OUTPATIENT
Start: 2023-06-12 | End: 2023-06-12 | Stop reason: HOSPADM

## 2023-06-12 RX ORDER — HYDROMORPHONE HYDROCHLORIDE 1 MG/ML
0.2 INJECTION, SOLUTION INTRAMUSCULAR; INTRAVENOUS; SUBCUTANEOUS
Status: DISCONTINUED | OUTPATIENT
Start: 2023-06-12 | End: 2023-06-12 | Stop reason: HOSPADM

## 2023-06-12 RX ORDER — MIDAZOLAM HYDROCHLORIDE 1 MG/ML
INJECTION INTRAMUSCULAR; INTRAVENOUS PRN
Status: DISCONTINUED | OUTPATIENT
Start: 2023-06-12 | End: 2023-06-12 | Stop reason: SURG

## 2023-06-12 RX ORDER — MEPERIDINE HYDROCHLORIDE 25 MG/ML
12.5 INJECTION INTRAMUSCULAR; INTRAVENOUS; SUBCUTANEOUS
Status: DISCONTINUED | OUTPATIENT
Start: 2023-06-12 | End: 2023-06-12 | Stop reason: HOSPADM

## 2023-06-12 RX ORDER — HALOPERIDOL 5 MG/ML
1 INJECTION INTRAMUSCULAR
Status: DISCONTINUED | OUTPATIENT
Start: 2023-06-12 | End: 2023-06-12 | Stop reason: HOSPADM

## 2023-06-12 RX ORDER — HYDROMORPHONE HYDROCHLORIDE 1 MG/ML
0.1 INJECTION, SOLUTION INTRAMUSCULAR; INTRAVENOUS; SUBCUTANEOUS
Status: DISCONTINUED | OUTPATIENT
Start: 2023-06-12 | End: 2023-06-12 | Stop reason: HOSPADM

## 2023-06-12 RX ADMIN — AMPICILLIN SODIUM AND SULBACTAM SODIUM 3 G: 2; 1 INJECTION, POWDER, FOR SOLUTION INTRAMUSCULAR; INTRAVENOUS at 05:30

## 2023-06-12 RX ADMIN — SODIUM CHLORIDE, POTASSIUM CHLORIDE, SODIUM LACTATE AND CALCIUM CHLORIDE: 600; 310; 30; 20 INJECTION, SOLUTION INTRAVENOUS at 02:23

## 2023-06-12 RX ADMIN — OXYCODONE HYDROCHLORIDE 10 MG: 10 TABLET ORAL at 22:54

## 2023-06-12 RX ADMIN — ACETAMINOPHEN 650 MG: 325 TABLET ORAL at 22:56

## 2023-06-12 RX ADMIN — FENTANYL CITRATE 50 MCG: 50 INJECTION, SOLUTION INTRAMUSCULAR; INTRAVENOUS at 17:57

## 2023-06-12 RX ADMIN — CEFAZOLIN 2 G: 1 INJECTION, POWDER, FOR SOLUTION INTRAMUSCULAR; INTRAVENOUS at 18:01

## 2023-06-12 RX ADMIN — INSULIN GLARGINE-YFGN 10 UNITS: 100 INJECTION, SOLUTION SUBCUTANEOUS at 05:35

## 2023-06-12 RX ADMIN — OXYCODONE HYDROCHLORIDE 10 MG: 10 TABLET ORAL at 19:52

## 2023-06-12 RX ADMIN — GADOTERIDOL 15 ML: 279.3 INJECTION, SOLUTION INTRAVENOUS at 12:06

## 2023-06-12 RX ADMIN — AMPICILLIN SODIUM AND SULBACTAM SODIUM 3 G: 2; 1 INJECTION, POWDER, FOR SOLUTION INTRAMUSCULAR; INTRAVENOUS at 15:27

## 2023-06-12 RX ADMIN — SODIUM CHLORIDE, POTASSIUM CHLORIDE, SODIUM LACTATE AND CALCIUM CHLORIDE: 600; 310; 30; 20 INJECTION, SOLUTION INTRAVENOUS at 23:08

## 2023-06-12 RX ADMIN — AMPICILLIN SODIUM AND SULBACTAM SODIUM 3 G: 2; 1 INJECTION, POWDER, FOR SOLUTION INTRAMUSCULAR; INTRAVENOUS at 23:06

## 2023-06-12 RX ADMIN — MIDAZOLAM 2 MG: 1 INJECTION, SOLUTION INTRAMUSCULAR; INTRAVENOUS at 17:57

## 2023-06-12 RX ADMIN — DOXYCYCLINE 100 MG: 100 TABLET, FILM COATED ORAL at 05:28

## 2023-06-12 RX ADMIN — OXYCODONE HYDROCHLORIDE 10 MG: 10 TABLET ORAL at 12:39

## 2023-06-12 RX ADMIN — OXYCODONE HYDROCHLORIDE 10 MG: 10 TABLET ORAL at 05:32

## 2023-06-12 RX ADMIN — AMPICILLIN SODIUM AND SULBACTAM SODIUM 3 G: 2; 1 INJECTION, POWDER, FOR SOLUTION INTRAMUSCULAR; INTRAVENOUS at 02:17

## 2023-06-12 ASSESSMENT — COGNITIVE AND FUNCTIONAL STATUS - GENERAL
HELP NEEDED FOR BATHING: A LOT
TOILETING: A LOT
SUGGESTED CMS G CODE MODIFIER DAILY ACTIVITY: CK
DAILY ACTIVITIY SCORE: 16
STANDING UP FROM CHAIR USING ARMS: A LOT
CLIMB 3 TO 5 STEPS WITH RAILING: TOTAL
SUGGESTED CMS G CODE MODIFIER MOBILITY: CK
DRESSING REGULAR LOWER BODY CLOTHING: A LOT
DRESSING REGULAR UPPER BODY CLOTHING: A LITTLE
MOVING FROM LYING ON BACK TO SITTING ON SIDE OF FLAT BED: A LITTLE
MOBILITY SCORE: 15
PERSONAL GROOMING: A LITTLE
WALKING IN HOSPITAL ROOM: TOTAL

## 2023-06-12 ASSESSMENT — PAIN SCALES - WONG BAKER
WONGBAKER_NUMERICALRESPONSE: HURTS JUST A LITTLE BIT
WONGBAKER_NUMERICALRESPONSE: HURTS A WHOLE LOT
WONGBAKER_NUMERICALRESPONSE: HURTS EVEN MORE
WONGBAKER_NUMERICALRESPONSE: HURTS EVEN MORE

## 2023-06-12 ASSESSMENT — PAIN DESCRIPTION - PAIN TYPE
TYPE: ACUTE PAIN
TYPE: ACUTE PAIN

## 2023-06-12 ASSESSMENT — FIBROSIS 4 INDEX: FIB4 SCORE: 0.83

## 2023-06-12 ASSESSMENT — GAIT ASSESSMENTS: GAIT LEVEL OF ASSIST: UNABLE TO PARTICIPATE

## 2023-06-12 ASSESSMENT — ACTIVITIES OF DAILY LIVING (ADL): TOILETING: INDEPENDENT

## 2023-06-12 NOTE — THERAPY
Physical Therapy   Initial Evaluation     Patient Name: Christoph Taylor  Age:  58 y.o., Sex:  male  Medical Record #: [id number]  Today's Date: 6/12/2023     Precautions  Precautions: Fall Risk Comments: Right thumb infection     Assessment  Patient is 58 y.o. male with a diagnosis of R thumb infection, DKA, enteritis. Past medical history of poorly controlled diabetes, history of old injury affecting L foot/ankle, T12 comp fracture. Pt lives alone and is Indep amb with SPC/ FWW or no AD when he is wearing his AFO L foot for an old injury. Pts ability to perform ADLs given R hand is swollen with decreased ROM is affecting his [place]. Also, pt could not put on his R AFO brace Independently. Pt states he can use a knee scooter to get around his house too when he isn't wearing the brace. Pt does not have a shoe to wear with his brace at the hospital. Given the design of the brace, it is not safe to ambulate without a shoe so gait was unable to be assessed at this time. Anticipate after surgery pt will continue to have a difficult time with ADLs and mobility and may benefit from SNF since he does live alone. See below for goals and POC. Suggested pt try and get someone to bring in shoe so gait can be assessed.     Plan    Physical Therapy Initial Treatment Plan   Treatment Plan : Gait Training, Neuro Re-Education / Balance, Stair Training, Therapeutic Activities, Therapeutic Exercise  Treatment Frequency: 5 Times per Week  Duration: Until Therapy Goals Met    DC Equipment Recommendations: Unable to determine at this time  Discharge Recommendations: Recommend post-acute placement for additional physical therapy services prior to discharge home        06/12/23 1003   Prior Living Situation   Prior Services Home-Independent   Housing / Facility 1 Story House     Steps Into Home 5     Steps In Home 0  (   Bathroom Set up Walk In Shower;Shower Chair   Equipment Owned Front-Wheel Walker;Single Point Cane;Tub / Shower  Seat  (knee scooter)   Lives with - Patient's Self Care Capacity Alone and Able to Care For Self     Comments Pt states has a neighbor he can rely on time to time     Prior Level of Functional Mobility   Bed Mobility Independent   Transfer Status Independent   Ambulation Independent   Assistive Devices Used Single Point Cane   Comments Pt has a rigid AFO/ splint that he wears every day to support his ankle and foot when ambulating (old injury years ago to foot). He also uses a knee scooter around the house when he doesn't put the brace on.   Cognition    Cognition / Consciousness X   Level of Consciousness Alert     Safety Awareness Impaired;Impulsive   Comments Pt is perseverating on not being able to eat (NPO for I&D of thumb), wants to go home, admits to being grumpy and frustrated. Currently pt is demonstrating poor insight into current deficits and how not being able to use R hand to assist with ADLs will affect him.     Active ROM Upper Body   Active ROM Upper Body  X   Dominant Hand Right   Comments Pt unable to use R hand functionally due to R thumb infection/swelling/pain     Strength Upper Body   Comments limited by pain in R hand, otherwise appears WFL   Active ROM Lower Body    Active ROM Lower Body  WDL   Comments old L foot injury resulting in flat foot/ foot deformity, limited active motion L foot   Strength Lower Body   Lower Body Strength  X   Comments L LE is weaker than R from old injury   Neurological Concerns   Comments foot drop L LE   Coordination Upper Body   Comments IMP FM R hand   Balance Assessment   Sitting Balance (Static) Fair +     Sitting Balance (Dynamic) Fair     Standing Balance (Static) Fair -     Standing Balance (Dynamic) Fair -     Weight Shift Sitting Fair     Weight Shift Standing Fair     Bed Mobility    Supine to Sit Modified Independent     Sit to Supine Modified Independent   Scooting Modified Independent   Rolling Modified Independent   Gait Analysis   Gait Level Of  Assist Unable to Participate  (pt does not have a shoe to wear with his brace. Given the design of the brace, it is not safe to ambulate without a shoe.)   Functional Mobility   Sit to Stand Standby Assist     Bed, Chair, Wheelchair Transfer Unable to Participate   Toilet Transfers Unable to Participate   Mobility stood only, unable to safety assess walking without proper shoe over AFO on L, and Pt refusing to use walker and try to walk NWB  on LLE   Activity Tolerance   Sitting Edge of Bed 10 min     Standing 30 sec     Edema / Skin Assessment   Comments L thumb red and swollen   Short Term Goals    Short Term Goal # 1 Pt will be able to perform sit <> stand and transfer Charity in 6 visits.   Short Term Goal # 2 Pt will be able to ambulate 100 ft with FWW Charity in 6 visits.   Short Term Goal # 3 Pt will be able to go up/down 5 steps with rail [place] in 6 visits.

## 2023-06-12 NOTE — CONSULTS
CC: Right thumb infection    HPI:   Patient is a 58-year-old male who presents with right thumb increased swelling pain and redness.  He has a history of poor control diabetes.  He noticed his right hand was getting increasingly more swollen and painful.  His thumb had some drainage and he presented to the emergency room for evaluation.  Exam and imaging were concerning for thumb infection and possible osteomyelitis and orthopedics was consulted for management.  He rates his pain in his thumb currently a 3 out of 10.  He is right-hand dominant    ROS: Positive for musculoskeletal pain and what is stated in the HPI and PMH, otherwise negative review of systems    PMH:   Past Medical History:   Diagnosis Date    Abnormal electrocardiogram (ECG) (EKG) 11/8/2021    KRISTAL (acute kidney injury) ([facility]) 11/8/2021    Chest pain in adult 11/8/2021    CKD (chronic kidney disease) stage 3, GFR 30-59 ml/min ([facility]) 11/7/2021    Diabetes ([facility])     Diabetic ketoacidosis without coma associated with type 2 diabetes mellitus ([facility]) 11/7/2021    Diarrhea 3/18/2022    DKA, type 1, not at goal ([facility]) 7/28/2022    Esophagitis 7/28/2022    [place] (hard of hearing)     Very [place] No hearing aides    Hypercholesteremia        PSH:   Past Surgical History:   Procedure Laterality Date    [place] UPPER GI ENDOSCOPY,DIAGNOSIS N/A 3/14/2023    Procedure: GASTROSCOPY;  Surgeon: Atilio Freed M.D.;  Location: SURGERY [facility];  Service: Gastroenterology    OTHER      appy, lumbar fusion       Meds:   Medications Prior to Admission   Medication Sig Dispense Refill Last Dose    oxyCODONE immediate-release (ROXICODONE) 5 MG Tab Take 5 mg by mouth every 8 hours as needed.   6/7/2023 at [facility]    insulin 70/30 (HUMULIN 70/30) (70-30) 100 UNIT/ML Suspension INJECT SUBCUTANEOUSLY 2 TO 28  UNITS TWICE DAILY PER SLIDING  SCALE (MAX OF 28 UNITS PER DAY) (Patient taking differently: Inject 2-28 Units under the skin 2 times a day. INJECT SUBCUTANEOUSLY 2 TO 28  UNITS  TWICE DAILY PER SLIDING  SCALE (MAX OF 28 UNITS PER DAY)) 30 mL 3 6/6/2023 at [facility]    Multiple Vitamin (ONE-A-DAY MENS) Tab Take 1 Tablet by mouth every day.   6/6/2023 at [facility]       Allergies:   No Known Allergies    SH:   Patient does live at home by himself    FH:   Family history noncontributory    Physical Exam:   General: AO x4  Eyes: non-icteric  Breathing: nonlabored  MSK:   Right thumb shows diffuse erythema from the base of the thumb to the tip  There is some purulent drainage from the tip of the finger  Small pocket of purulence dorsally along the midportion of the thumb  Sensation is intact  Thumb is warm and well-perfused  No flexor tendon pain or tracking proximally in the hand    Imaging:   MRI shows diffuse soft tissue infection consistent with cellulitis and possible bony changes in the distal aspect of the thumb consistent with osteomyelitis.      Assessment/Plan:   58-year-old male with poorly controlled diabetes who presents with right thumb infection with soft tissue pulm infection of the distal thumb and possible osteomyelitis of the distal phalanx.  We discussed treatment options at length.  I do think he is indicated for a surgical debridement to debride some of the infection of the tip of the finger and hopefully improve his chance of eradicating his infection.  His imaging both CT and MRI are highly suspicious for osteomyelitis of the finger.  However I do not recommend any surgical debridement of the distal thumb as this would be a highly morbid procedure to remove a portion of his thumb.  We will likely take small bone biopsy to evaluate for osteomyelitis.  He is continued to worsen without surgical intervention so I do not think that IV therapy alone will result in successful treatment of the infection.  He understands the risk of surgery including persistent infection, pain, dysfunction of the stump, need for possible partial thumb amputation in the future, and other potential  perioperative complications

## 2023-06-12 NOTE — CARE PLAN
The patient is Watcher - Medium risk of patient condition declining or worsening    Shift Goals  Clinical Goals: pain control,ABX  Patient Goals: pain control  Family Goals: KATHERINE    Progress made toward(s) clinical / shift goals:    Problem: Pain - Standard  Goal: Alleviation of pain or a reduction in pain to the patient’s comfort goal  Outcome: Progressing   Pain treated with meds    Problem: Knowledge Deficit - Standard  Goal: Patient and family/care givers will demonstrate understanding of plan of care, disease process/condition, diagnostic tests and medications  Outcome: Progressing   POC discussed with pt. Pt states understanding.     Patient is not progressing towards the following goals:

## 2023-06-12 NOTE — PROGRESS NOTES
Telemetry Shift Summary     Rhythm: SR  HR: 64-71  Ectopy: rPVC    Measurements: .16/.08/.38    Normal Values  Rhythm: SR  HR:   Measurements: 0.12-0.20/0.08-0.10/0.30-0.52
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
## 2023-06-13 LAB
ANION GAP SERPL CALC-SCNC: 10 MMOL/L (ref 7–16)
BUN SERPL-MCNC: 7 MG/DL (ref 8–22)
CALCIUM SERPL-MCNC: 7.7 MG/DL (ref 8.4–10.2)
CHLORIDE SERPL-SCNC: 96 MMOL/L (ref 96–112)
CO2 SERPL-SCNC: 29 MMOL/L (ref 20–33)
CREAT SERPL-MCNC: 0.45 MG/DL (ref 0.5–1.4)
ERYTHROCYTE [DISTWIDTH] IN BLOOD BY AUTOMATED COUNT: 39 FL (ref 35.9–50)
GFR SERPLBLD CREATININE-BSD FMLA CKD-EPI: 121 ML/MIN/1.73 M 2
GLUCOSE BLD STRIP.AUTO-MCNC: 158 MG/DL (ref 65–99)
GLUCOSE BLD STRIP.AUTO-MCNC: 221 MG/DL (ref 65–99)
GLUCOSE BLD STRIP.AUTO-MCNC: 267 MG/DL (ref 65–99)
GLUCOSE BLD STRIP.AUTO-MCNC: 278 MG/DL (ref 65–99)
GLUCOSE SERPL-MCNC: 169 MG/DL (ref 65–99)
GRAM STN SPEC: NORMAL
HCT VFR BLD AUTO: 32.2 % (ref 42–52)
HGB BLD-MCNC: 10.6 G/DL (ref 14–18)
MCH RBC QN AUTO: 28.2 PG (ref 27–33)
MCHC RBC AUTO-ENTMCNC: 32.9 G/DL (ref 32.3–36.5)
MCV RBC AUTO: 85.6 FL (ref 81.4–97.8)
PLATELET # BLD AUTO: 173 K/UL (ref 164–446)
PMV BLD AUTO: 8.6 FL (ref 9–12.9)
POTASSIUM SERPL-SCNC: 3.7 MMOL/L (ref 3.6–5.5)
RBC # BLD AUTO: 3.76 M/UL (ref 4.7–6.1)
SIGNIFICANT IND 70042: NORMAL
SITE SITE: NORMAL
SODIUM SERPL-SCNC: 135 MMOL/L (ref 135–145)
SOURCE SOURCE: NORMAL
WBC # BLD AUTO: 7 K/UL (ref 4.8–10.8)

## 2023-06-13 PROCEDURE — 82962 GLUCOSE BLOOD TEST: CPT | Mod: 91

## 2023-06-13 PROCEDURE — 36415 COLL VENOUS BLD VENIPUNCTURE: CPT

## 2023-06-13 PROCEDURE — 306000 HYDROGEL 1.0 OZ: Performed by: INTERNAL MEDICINE

## 2023-06-13 PROCEDURE — 80048 BASIC METABOLIC PNL TOTAL CA: CPT

## 2023-06-13 PROCEDURE — 700105 HCHG RX REV CODE 258: Performed by: STUDENT IN AN ORGANIZED HEALTH CARE EDUCATION/TRAINING PROGRAM

## 2023-06-13 PROCEDURE — 700111 HCHG RX REV CODE 636 W/ 250 OVERRIDE (IP): Performed by: HOSPITALIST

## 2023-06-13 PROCEDURE — A9270 NON-COVERED ITEM OR SERVICE: HCPCS | Performed by: HOSPITALIST

## 2023-06-13 PROCEDURE — 700102 HCHG RX REV CODE 250 W/ 637 OVERRIDE(OP): Performed by: HOSPITALIST

## 2023-06-13 PROCEDURE — 99232 SBSQ HOSP IP/OBS MODERATE 35: CPT | Performed by: INTERNAL MEDICINE

## 2023-06-13 PROCEDURE — A9270 NON-COVERED ITEM OR SERVICE: HCPCS | Performed by: STUDENT IN AN ORGANIZED HEALTH CARE EDUCATION/TRAINING PROGRAM

## 2023-06-13 PROCEDURE — 85027 COMPLETE CBC AUTOMATED: CPT

## 2023-06-13 PROCEDURE — 700111 HCHG RX REV CODE 636 W/ 250 OVERRIDE (IP): Performed by: STUDENT IN AN ORGANIZED HEALTH CARE EDUCATION/TRAINING PROGRAM

## 2023-06-13 PROCEDURE — 97597 DBRDMT OPN WND 1ST 20 CM/<: CPT

## 2023-06-13 PROCEDURE — 770020 HCHG ROOM/CARE - TELE (206)

## 2023-06-13 PROCEDURE — 94760 N-INVAS EAR/PLS OXIMETRY 1: CPT

## 2023-06-13 PROCEDURE — 700102 HCHG RX REV CODE 250 W/ 637 OVERRIDE(OP): Performed by: STUDENT IN AN ORGANIZED HEALTH CARE EDUCATION/TRAINING PROGRAM

## 2023-06-13 PROCEDURE — 700101 HCHG RX REV CODE 250: Performed by: ORTHOPAEDIC SURGERY

## 2023-06-13 RX ORDER — SODIUM HYPOCHLORITE 1.25 MG/ML
SOLUTION TOPICAL 2 TIMES DAILY
Status: DISCONTINUED | OUTPATIENT
Start: 2023-06-13 | End: 2023-06-16 | Stop reason: HOSPADM

## 2023-06-13 RX ADMIN — OXYCODONE 5 MG: 5 TABLET ORAL at 23:46

## 2023-06-13 RX ADMIN — AMPICILLIN SODIUM AND SULBACTAM SODIUM 3 G: 2; 1 INJECTION, POWDER, FOR SOLUTION INTRAMUSCULAR; INTRAVENOUS at 18:09

## 2023-06-13 RX ADMIN — DOXYCYCLINE 100 MG: 100 TABLET, FILM COATED ORAL at 07:24

## 2023-06-13 RX ADMIN — INSULIN LISPRO 4 UNITS: 100 INJECTION, SOLUTION INTRAVENOUS; SUBCUTANEOUS at 18:03

## 2023-06-13 RX ADMIN — INSULIN LISPRO 3 UNITS: 100 INJECTION, SOLUTION INTRAVENOUS; SUBCUTANEOUS at 12:46

## 2023-06-13 RX ADMIN — INSULIN GLARGINE-YFGN 10 UNITS: 100 INJECTION, SOLUTION SUBCUTANEOUS at 18:04

## 2023-06-13 RX ADMIN — ENOXAPARIN SODIUM 40 MG: 100 INJECTION SUBCUTANEOUS at 18:03

## 2023-06-13 RX ADMIN — OXYCODONE HYDROCHLORIDE 10 MG: 10 TABLET ORAL at 14:10

## 2023-06-13 RX ADMIN — INSULIN LISPRO 7 UNITS: 100 INJECTION, SOLUTION INTRAVENOUS; SUBCUTANEOUS at 20:54

## 2023-06-13 RX ADMIN — AMPICILLIN SODIUM AND SULBACTAM SODIUM 3 G: 2; 1 INJECTION, POWDER, FOR SOLUTION INTRAMUSCULAR; INTRAVENOUS at 06:29

## 2023-06-13 RX ADMIN — INSULIN LISPRO 7 UNITS: 100 INJECTION, SOLUTION INTRAVENOUS; SUBCUTANEOUS at 06:22

## 2023-06-13 RX ADMIN — SENNOSIDES AND DOCUSATE SODIUM 2 TABLET: 50; 8.6 TABLET ORAL at 18:03

## 2023-06-13 RX ADMIN — INSULIN GLARGINE-YFGN 10 UNITS: 100 INJECTION, SOLUTION SUBCUTANEOUS at 06:24

## 2023-06-13 RX ADMIN — OXYCODONE HYDROCHLORIDE 10 MG: 10 TABLET ORAL at 19:54

## 2023-06-13 RX ADMIN — DOXYCYCLINE 100 MG: 100 TABLET, FILM COATED ORAL at 18:03

## 2023-06-13 RX ADMIN — AMPICILLIN SODIUM AND SULBACTAM SODIUM 3 G: 2; 1 INJECTION, POWDER, FOR SOLUTION INTRAMUSCULAR; INTRAVENOUS at 14:14

## 2023-06-13 RX ADMIN — AMPICILLIN SODIUM AND SULBACTAM SODIUM 3 G: 2; 1 INJECTION, POWDER, FOR SOLUTION INTRAMUSCULAR; INTRAVENOUS at 23:28

## 2023-06-13 RX ADMIN — DAKIN'S SOLUTION 0.125% (QUARTER STRENGTH) 150 ML: 0.12 SOLUTION at 20:09

## 2023-06-13 RX ADMIN — SODIUM CHLORIDE, POTASSIUM CHLORIDE, SODIUM LACTATE AND CALCIUM CHLORIDE: 600; 310; 30; 20 INJECTION, SOLUTION INTRAVENOUS at 20:02

## 2023-06-13 RX ADMIN — OXYCODONE HYDROCHLORIDE 10 MG: 10 TABLET ORAL at 06:27

## 2023-06-13 ASSESSMENT — PAIN DESCRIPTION - PAIN TYPE
TYPE: ACUTE PAIN;SURGICAL PAIN
TYPE: ACUTE PAIN
TYPE: ACUTE PAIN

## 2023-06-13 ASSESSMENT — COGNITIVE AND FUNCTIONAL STATUS - GENERAL
DRESSING REGULAR LOWER BODY CLOTHING: A LITTLE
DAILY ACTIVITIY SCORE: 22
HELP NEEDED FOR BATHING: A LITTLE
STANDING UP FROM CHAIR USING ARMS: A LITTLE
MOVING TO AND FROM BED TO CHAIR: A LITTLE
MOBILITY SCORE: 18
CLIMB 3 TO 5 STEPS WITH RAILING: A LOT
SUGGESTED CMS G CODE MODIFIER MOBILITY: CK
SUGGESTED CMS G CODE MODIFIER DAILY ACTIVITY: CJ
WALKING IN HOSPITAL ROOM: A LOT

## 2023-06-13 ASSESSMENT — ENCOUNTER SYMPTOMS
CONSTIPATION: 0
SPEECH CHANGE: 0
PHOTOPHOBIA: 0
NERVOUS/ANXIOUS: 0
COUGH: 0
BLURRED VISION: 0
CLAUDICATION: 0
ABDOMINAL PAIN: 0
HEADACHES: 0
WEAKNESS: 0
DIZZINESS: 0
SHORTNESS OF BREATH: 0
DEPRESSION: 0
MYALGIAS: 0
VOMITING: 0
INSOMNIA: 0
CHILLS: 0
NAUSEA: 0
DIARRHEA: 0
HEARTBURN: 0
FEVER: 0
SENSORY CHANGE: 0

## 2023-06-13 ASSESSMENT — FIBROSIS 4 INDEX
FIB4 SCORE: 0.93
FIB4 SCORE: 0.93

## 2023-06-13 NOTE — PROGRESS NOTES
Hospital Medicine Daily Progress Note    Date of Service  6/13/2023    Chief Complaint  Christoph Taylor is a 58 y.o. male admitted 6/8/2023 with abdominal pain right finger pain and swelling    Hospital Course  Patient is a 58-year-old male with history of diabetes presented on 6 8 with abdominal pain and right finger pain and swelling.  He has been feeling poorly over the last week and worse over the last 2 days prior to admission.  Worsening abdominal pain in the epigastric region nausea without diarrhea.  Patient was initially admitted to the medical floor however was transferred to the ICU secondary to syncope and the development of DKA with an anion gap of 27.  He was successfully treated with insulin drip IV fluids blood glucoses improved and his gap is closed and was transferred out of the intensive care unit the next day.  He continued to have significant pain and erythema at the right thumb and MRI showed concern of osteomyelitis therefore orthopedic surgery was consulted and he was taken to the OR for debridement as well as bone biopsy to see if osteomyelitis is in fact present.  The pathology from the biopsy is currently pending, if it is present then we will discuss further with infectious disease if it is not then his course of antibiotics will be far less.    Interval Problem Update  6/13 patient is feeling better today as he is postop day 1 from I&D of the right thumb.  He did have biopsy taken in the operating room and this is currently pending pathology review to see if osteomyelitis is present.  If this is I will discuss further with infectious disease.  His abdominal pain has since resolved and his appetite has returned and he is feeling much better and I suspect that his symptoms were more related to a systemic reaction related to his infection and development of DKA then a fulminant infectious enteritis.    I have discussed this patient's plan of care and discharge plan at IDT rounds today  with Case Management, Nursing, Nursing leadership, and other members of the IDT team.    Consultants/Specialty  orthopedics    Code Status  Full Code    Disposition  The patient is not medically cleared for discharge to home or a post-acute facility.  Anticipate discharge to: skilled nursing facility    I have placed the appropriate orders for post-discharge needs.    Review of Systems  Review of Systems   Constitutional:  Negative for chills and fever.   HENT:  Negative for congestion.    Eyes:  Negative for blurred vision and photophobia.   Respiratory:  Negative for cough and shortness of breath.    Cardiovascular:  Negative for chest pain, claudication and leg swelling.   Gastrointestinal:  Negative for abdominal pain, constipation, diarrhea, heartburn, nausea and vomiting.   Genitourinary:  Negative for dysuria and hematuria.   Musculoskeletal:  Negative for joint pain and myalgias.        Right thumb pain   Skin:  Negative for itching and rash.   Neurological:  Negative for dizziness, sensory change, speech change, weakness and headaches.   Psychiatric/Behavioral:  Negative for depression. The patient is not nervous/anxious and does not have insomnia.         Physical Exam  Temp:  [36.4 °C (97.5 °F)-37 °C (98.6 °F)] 36.7 °C (98 °F)  Pulse:  [64-92] 74  Resp:  [16-20] 18  BP: ()/(60-91) 98/60  SpO2:  [89 %-98 %] 89 %    Physical Exam  Vitals and nursing note reviewed.   Constitutional:       General: He is not in acute distress.     Appearance: Normal appearance.   HENT:      Head: Normocephalic and atraumatic.   Eyes:      General: No scleral icterus.     Extraocular Movements: Extraocular movements intact.   Cardiovascular:      Rate and Rhythm: Normal rate and regular rhythm.      Pulses: Normal pulses.      Heart sounds: Normal heart sounds. No murmur heard.  Pulmonary:      Effort: Pulmonary effort is normal. No respiratory distress.      Breath sounds: Normal breath sounds. No wheezing, rhonchi or  rales.   Abdominal:      General: Abdomen is flat. Bowel sounds are normal. There is no distension.      Palpations: Abdomen is soft.      Tenderness: There is no right CVA tenderness or rebound.   Musculoskeletal:         General: No swelling or tenderness.      Cervical back: Normal range of motion and neck supple.   Lymphadenopathy:      Cervical: No cervical adenopathy.   Skin:     Coloration: Skin is not jaundiced.      Findings: No erythema.   Neurological:      General: No focal deficit present.      Mental Status: He is alert and oriented to person, place, and time. Mental status is at baseline.      Cranial Nerves: No cranial nerve deficit.   Psychiatric:         Mood and Affect: Mood normal.         Behavior: Behavior normal.         Fluids    Intake/Output Summary (Last 24 hours) at 6/13/2023 1514  Last data filed at 6/13/2023 1000  Gross per 24 hour   Intake 1510.89 ml   Output 1550 ml   Net -39.11 ml       Laboratory  Recent Labs     06/11/23  0115 06/12/23  0145 06/13/23  0018   WBC 8.1 7.2 7.0   RBC 3.95* 3.77* 3.76*   HEMOGLOBIN 11.1* 10.5* 10.6*   HEMATOCRIT 33.2* 32.4* 32.2*   MCV 84.1 85.9 85.6   MCH 28.1 27.9 28.2   MCHC 33.4 32.4 32.9   RDW 38.6 39.5 39.0   PLATELETCT 194 193 173   MPV 8.7* 9.1 8.6*     Recent Labs     06/11/23  0115 06/12/23  0145 06/13/23  0018   SODIUM 136 133* 135   POTASSIUM 3.2* 3.7 3.7   CHLORIDE 98 97 96   CO2 29 31 29   GLUCOSE 91 176* 169*   BUN 8 8 7*   CREATININE 0.41* 0.49* 0.45*   CALCIUM 7.7* 7.7* 7.7*                   Imaging  MR-HAND - WITH & W/O RIGHT   Final Result      1.  Marrow edema, enhancement, and erosive change of the first distal phalanx with likely intramedullary gas. This most likely represents presence of osteomyelitis.      2.  Mild marrow edema and enhancement of the first proximal phalanx which may be reactive in nature versus representative of early osteomyelitis.      3.  Cellulitis involves the thumb distally to the greatest degree. This  also extends proximally into the thenar area and extends along the dorsal and volar aspects of the hand.      4.  Multifocal osteoarthritis.      CT-HAND WITH RIGHT   Final Result      1.  Erosive change of the tuft of the first distal phalanx with osteopenia involving the remainder the phalanx with some intramedullary gas. This likely represents presence of osteomyelitis.      2.  Cellulitis involving the thumb most prominently distally. No focal fluid collection to suggest abscess.      3.  Cellulitis involving the remainder of the hand and wrist.      EC-ECHOCARDIOGRAM COMPLETE W/O CONT   Final Result      DX-CHEST-PORTABLE (1 VIEW)   Final Result         1.  Left basilar atelectasis, no focal infiltrate      CT-ABDOMEN-PELVIS WITH   Final Result      1.  Horseshoe kidney with mild hydronephrosis, possibly related to bladder distention. No obstructing stone seen.   2.  Small, nonobstructing renal stones.   3.  Features suggestive of enteritis. No evidence of obstruction.   4.  Patchy consolidations of the left lower lobe, likely infectious or inflammatory.           Assessment/Plan  * Osteomyelitis of right hand (HCC)- (present on admission)  Assessment & Plan  Patient is status post I&D of the right thumb on 612  Awaiting pathology to determine if osteomyelitis present  If present will discuss with infectious disease for duration of treatment  Interoperative culture showing no growth however patient was already on antibiotics for his enteritis    Started Unasyn and doxycycline 6/11    Elevated troponin  Assessment & Plan  Troponin was slightly elevated and trending down, 29> 24,   denies chest pain,   EKG I personally reviewed,  no acute ischemic ST-T changes.      Likely demand ischemia in the setting of DKA    Enteritis  Assessment & Plan  Tolerating diet without issue  Abdominal pain has resolved    Acute metabolic encephalopathy  Assessment & Plan  Resolved    Syncope  Assessment & Plan  Likely related to  DKA and hypovolemia  Symptoms have since resolved    AP (abdominal pain)- (present on admission)  Assessment & Plan  Resolved with DKA treatment    Dyslipidemia- (present on admission)  Assessment & Plan  Cardiac diet when able to tolerate orally    Uncontrolled type 2 diabetes mellitus with hyperglycemia (HCC)- (present on admission)  Assessment & Plan  With marked hyperglycemia  Last glycated hemoglobin was 15.2% indicating a very poor control  I will start Premeal, long & short acting insulin for now  Accu-Checks, hypoglycemia protocol  Adjust according to blood sugars trend     6/10 continue Lantus at 10 units twice daily, SSI      Metabolic acidosis, increased anion gap- (present on admission)  Assessment & Plan  Likely multifactorial due to reduced intravascular volume/hyperglycemia/dehydration, infection/colitis  Anticipate improvement with intravenous fluids and initiating insulin, continue to monitor    Diabetic ketoacidosis without coma (HCC)  Assessment & Plan  Resolved, out of ICU  Likely due to underlying infection  Continue insulin and close monitor         VTE prophylaxis: SCDs/TEDs    I have performed a physical exam and reviewed and updated ROS and Plan today (6/13/2023). In review of yesterday's note (6/12/2023), there are no changes except as documented above.

## 2023-06-13 NOTE — DISCHARGE PLANNING
Case Management Discharge Planning    Admission Date: 6/8/2023  GMLOS: 3.9  ALOS: 5    6-Clicks ADL Score: 16  6-Clicks Mobility Score: 15  PT and/or OT Eval ordered: Yes  Post-acute Referrals Ordered: Yes  Post-acute Choice Obtained: Yes  Has referral(s) been sent to post-acute provider:  Yes    Anticipated Discharge Dispo: Discharge Disposition: D/T to SNF with Medicare cert in anticipation of skilled care (03)    DME Needed: No    Action(s) Taken: Updated Provider/Nurse on Discharge Plan and DC Assessment Complete (See below)  Patient discussed during morning IDT rounds with team. Patient is not medically cleared for discharge at this time. SW assessment completed with patient at bedside. Patient lives alone, reports his son is his emergency contact but son lives in [place]. Patient uses a cane and walker to ambulate. Per PT recommendation, patient interested in attending SNF. Patient states he prefers the closest location to his home, but was open to expanding choices. [facility] form [facility]. SW discussed back up plan of home with HH if needed and patient was agreeable. No other needs reported at this time.     [facility] [phone]    Escalations Completed: None    Medically Clear: No    Next Steps: SNF acceptance     Barriers to Discharge: Medical clearance and Pending Placement    Is the patient up for discharge tomorrow: No    Care Transition Team Assessment    Information Source  Orientation Level: Oriented X4  Information Given By: Patient    Readmission Evaluation  Is this a readmission?: No    Elopement Risk  Legal Hold: No  Ambulatory or Self Mobile in Wheelchair: No-Not an Elopement Risk  Disoriented: No  Psychiatric Symptoms: None  History of Wandering: No  Elopement this Admit: No  Vocalizing Wanting to Leave: No  Displays Behaviors, Body Language Wanting to Leave: No-Not at Risk for Elopement  Elopement Risk: Not at Risk for Elopement    Interdisciplinary Discharge Planning  Lives with - Patient's  Self Care Capacity: Alone and Able to Care For Self  Patient or legal guardian wants to designate a caregiver: No  Support Systems: Friends / Neighbors  Housing / Facility: Mobile Home  Able to Return to Previous ADL's: Future Time w/Therapy  Prior Services: Home-Independent  Durable Medical Equipment: Walker    Discharge Preparedness  What is your plan after discharge?: Skilled nursing facility  What are your discharge supports?: Child  Prior Functional Level: Uses Walker, Uses Cane    Functional Assesment  Prior Functional Level: Uses Walker, Uses Cane    Vision / Hearing Impairment  Vision Impairment : No  Hearing Impairment : No    Domestic Abuse  Have you ever been the victim of abuse or violence?: No  Physical Abuse or Sexual Abuse: No  Verbal Abuse or Emotional Abuse: No  Possible Abuse/Neglect Reported to:: Not Applicable    Discharge Risks or Barriers  Patient risk factors: Lives alone and no community support    Anticipated Discharge Information  Discharge Disposition: D/T to SNF with Medicare cert in anticipation of skilled care (03)

## 2023-06-13 NOTE — PROGRESS NOTES
Hospital Medicine Daily Progress Note    Date of Service  6/12/2023    Chief Complaint  Christoph Taylor is a 58 y.o. male admitted 6/8/2023 with abdominal pain    Hospital Course  Christoph Taylor is a 58 y.o. male with a past medical history of poorly controlled diabetes who presented 6/8/2023 with abdominal pain and right finger pain redness and swelling.  Patient reports that he has not been feeling well over the past 1 week and got much worse over the past 2 days.  Has been having progressively worsening abdominal pain.  The pain is rated as severe located mostly in the epigastric region.  He does have nausea but no diarrhea.  Pain is worse after eating.  He denies noticing any blood or mucus in his stool.   CT abd showed  Horseshoe kidney with mild hydronephrosis, possibly related to bladder distention. No obstructing stone seen; Features suggestive of enteritis; Patchy consolidations of the left lower lobe, likely infectious or inflammatory.    Patient was admitted to medical floor initially, transferred to ICU overnight for episodes of syncope and new development of DKA, with anion gap 27, elevated Ketone.  She was treated with insulin drip and IV fluid.  His BG improved, AG closed.  he was transferred out of ICU the next day.     Echo unremarkable with a EF of 65%    Troponin was slightly elevated and trending down, 29> 24, denies chest pain, EKG I personally reviewed,  no acute ischemic ST-T changes.  Likely demand ischemia in the setting of DKA    Phos 2.3, replaced  Urine drug screen positive for amphetamine, opioids, oxycodone    A1c 15.2    Interval Problem Update  Patient was seen and examined    Reported worsening R thrumb swelling and tenderness  CT with contrast revealed possible OM of the tuft of the first distal phalanx;   I talked to ortho, Dr Bazan, plan for limited I/D of the infected tissue, but not recommended deep I/D of the bone given highly morbidity.     MRI pending. Will consult  ID if OM confirmed.     Started Unasyn and doxycycline 6/11.   Completed 5 days of rocephin and flagyl for enteritis 6/10    Continue to monitor BG    I have discussed this patient's plan of care and discharge plan at IDT rounds today with Case Management, Nursing, Nursing leadership, and other members of the IDT team.    Consultants/Specialty  critical care    Code Status  Full Code    Disposition  The patient is not medically cleared for discharge to home or a post-acute facility.      I have placed the appropriate orders for post-discharge needs.    Review of Systems  All 12 systems were reviewed and negative except as mentioned above      Physical Exam  Temp:  [36.4 °C (97.5 °F)-37.1 °C (98.7 °F)] 36.4 °C (97.5 °F)  Pulse:  [64-83] 66  Resp:  [16-18] 18  BP: ()/(51-73) 104/71  SpO2:  [92 %-98 %] 98 %    Physical Exam  Constitutional:       Appearance: He is ill-appearing.   HENT:      Head: Normocephalic.      Nose: Nose normal.      Mouth/Throat:      Mouth: Mucous membranes are moist.   Eyes:      Extraocular Movements: Extraocular movements intact.      Conjunctiva/sclera: Conjunctivae normal.   Cardiovascular:      Rate and Rhythm: Normal rate and regular rhythm.      Pulses: Normal pulses.      Heart sounds: Normal heart sounds.   Pulmonary:      Effort: Pulmonary effort is normal.      Breath sounds: Normal breath sounds. No wheezing or rales.   Abdominal:      General: Bowel sounds are normal.      Palpations: Abdomen is soft.      Tenderness: There is abdominal tenderness.   Musculoskeletal:         General: No swelling or tenderness. Normal range of motion.      Cervical back: Normal range of motion and neck supple.   Neurological:      Mental Status: He is alert and oriented to person, place, and time. Mental status is at baseline.   Psychiatric:         Mood and Affect: Mood normal.         Fluids    Intake/Output Summary (Last 24 hours) at 6/12/2023 1840  Last data filed at 6/12/2023  1818  Gross per 24 hour   Intake 700 ml   Output 600 ml   Net 100 ml       Laboratory  Recent Labs     06/10/23  0151 06/11/23  0115 06/12/23  0145   WBC 11.3* 8.1 7.2   RBC 3.96* 3.95* 3.77*   HEMOGLOBIN 11.3* 11.1* 10.5*   HEMATOCRIT 33.7* 33.2* 32.4*   MCV 85.1 84.1 85.9   MCH 28.5 28.1 27.9   MCHC 33.5 33.4 32.4   RDW 39.4 38.6 39.5   PLATELETCT 226 194 193   MPV 9.1 8.7* 9.1     Recent Labs     06/10/23  0151 06/11/23  0115 06/12/23  0145   SODIUM 136 136 133*   POTASSIUM 3.6 3.2* 3.7   CHLORIDE 101 98 97   CO2 27 29 31   GLUCOSE 134* 91 176*   BUN 15 8 8   CREATININE 0.53 0.41* 0.49*   CALCIUM 7.6* 7.7* 7.7*                   Imaging  MR-HAND - WITH & W/O RIGHT   Final Result      1.  Marrow edema, enhancement, and erosive change of the first distal phalanx with likely intramedullary gas. This most likely represents presence of osteomyelitis.      2.  Mild marrow edema and enhancement of the first proximal phalanx which may be reactive in nature versus representative of early osteomyelitis.      3.  Cellulitis involves the thumb distally to the greatest degree. This also extends proximally into the thenar area and extends along the dorsal and volar aspects of the hand.      4.  Multifocal osteoarthritis.      CT-HAND WITH RIGHT   Final Result      1.  Erosive change of the tuft of the first distal phalanx with osteopenia involving the remainder the phalanx with some intramedullary gas. This likely represents presence of osteomyelitis.      2.  Cellulitis involving the thumb most prominently distally. No focal fluid collection to suggest abscess.      3.  Cellulitis involving the remainder of the hand and wrist.      EC-ECHOCARDIOGRAM COMPLETE W/O CONT   Final Result      DX-CHEST-PORTABLE (1 VIEW)   Final Result         1.  Left basilar atelectasis, no focal infiltrate      CT-ABDOMEN-PELVIS WITH   Final Result      1.  Horseshoe kidney with mild hydronephrosis, possibly related to bladder distention. No  "obstructing stone seen.   2.  Small, nonobstructing renal stones.   3.  Features suggestive of enteritis. No evidence of obstruction.   4.  Patchy consolidations of the left lower lobe, likely infectious or inflammatory.           Assessment/Plan  * Osteomyelitis of right hand (HCC)- (present on admission)  Assessment & Plan  Received 5 days of rocephin and flagyl for enteritis by 6/11 6/12 CT with contrast revealed possible OM of the tuft of the first distal phalanx;   I talked to ortho, Dr Bazan, plan for limited I/D of the infected tissue, but not recommended deep I/D of the bone given highly morbidity.   MRI pending. Will consult ID if OM confirmed.     Started Unasyn and doxycycline 6/11    Enteritis  Assessment & Plan  Reported abdominal pain in epigastric area, nausea, denies vomiting or diarrhea, denies blood in stool or melena  CT abdomen consistent with enteritis    Continue Rocephin and Flagyl  Pain control  IV fluid    6/11 Reported abdominal pain is better, want to advance the diet.  Started GI soft, may downgrade to the floor patient developed GI symptoms  6/12 continue to improve    Elevated troponin  Assessment & Plan  Troponin was slightly elevated and trending down, 29> 24,   denies chest pain,   EKG I personally reviewed,  no acute ischemic ST-T changes.      Likely demand ischemia in the setting of DKA    Syncope  Assessment & Plan  Per note \"Rapid response was called for  losing consciousness and unresponsive for approximately 3 to 4 minutes\"  Likely due to hypovolemia and DKA    Echo unremarkable with a EF of 65%    Telemetry monitor    Abdominal pain likely secondary to enterocolitis- (present on admission)  Assessment & Plan  I will check CT scan of the abdomen for further evaluation  I will start ceftriaxone and metronidazole, follow on cultures and sensitivities  I will start a modified clear liquid diet, advance as tolerated    Dyslipidemia- (present on admission)  Assessment & " Plan  Cardiac diet when able to tolerate orally    Uncontrolled type 2 diabetes mellitus with hyperglycemia (HCC)- (present on admission)  Assessment & Plan  With marked hyperglycemia  Last glycated hemoglobin was 15.2% indicating a very poor control  I will start Premeal, long & short acting insulin for now  Accu-Checks, hypoglycemia protocol  Adjust according to blood sugars trend     6/10 continue Lantus at 10 units twice daily, SSI  BG today 120-160    Metabolic acidosis, increased anion gap- (present on admission)  Assessment & Plan  Likely multifactorial due to reduced intravascular volume/hyperglycemia/dehydration, infection/colitis  Anticipate improvement with intravenous fluids and initiating insulin, continue to monitor    Diabetic ketoacidosis without coma (HCC)  Assessment & Plan  Patient was admitted to medical floor initially, transferred to ICU overnight for episodes of syncope and new development of DKA, with anion gap 27, elevated Ketone.  She was treated with insulin drip and IV fluid.  His BG improved, AG closed.  he was transferred out of ICU today    Likely due to underlying infection  Continue insulin and close monitor         VTE prophylaxis: enoxaparin ppx    I have performed a physical exam and reviewed and updated ROS and Plan today (6/12/2023). In review of yesterday's note (6/11/2023), there are no changes except as documented above.    Total time:  52 minutes.  I spent greater than 50% of the time for patient care, counseling, and coordination on this date, including unit/floor time, and face-to-face time with the patient as per interval events and assessment and plan above      ROS

## 2023-06-13 NOTE — HOSPITAL COURSE
Patient is a 58-year-old male with history of diabetes presented on 6 8 with abdominal pain and right finger pain and swelling.  He has been feeling poorly over the last week and worse over the last 2 days prior to admission.  Worsening abdominal pain in the epigastric region nausea without diarrhea.  Patient was initially admitted to the medical floor however was transferred to the ICU secondary to syncope and the development of DKA with an anion gap of 27.  He was successfully treated with insulin drip IV fluids blood glucoses improved and his gap is closed and was transferred out of the intensive care unit the next day.  He continued to have significant pain and erythema at the right thumb and MRI showed concern of osteomyelitis therefore orthopedic surgery was consulted and he was taken to the OR for debridement as well as bone biopsy to see if osteomyelitis is in fact present.  The pathology from the biopsy is currently pending, if it is present then we will discuss further with infectious disease if it is not then his course of antibiotics will be far less.

## 2023-06-13 NOTE — THERAPY
Occupational Therapy Contact Note    Patient Name: Christoph Taylor  Age:  58 y.o., Sex:  male  Medical Record #: [id number]  Today's Date: 6/13/2023 06/13/23 1245   Treatment Variance   Reason For Missed Therapy Non-Medical - Patient Refused   Interdisciplinary Plan of Care Collaboration   IDT Collaboration with  Nursing   Collaboration Comments Checked in with pt who is now post op day 1 I&D of R thumb.  Dressing intact, pt currently self feeding indep with R hand using spoon, and reports he still would not be able to don clothing, afshin shoes, socks, AFO.  He also still does not have a shoe here and refuses to walk without his shoe.  Will monitor for pt's ability to participate more in therapy.  Pt cannot take care of himself alone at this time and will benefit from SNF until thumb is healed.

## 2023-06-13 NOTE — PROGRESS NOTES
Telemetry Shift Summary     Rhythm: SR  HR: 77-82  Ectopy: rPVC    Measurements: .20/.08/.34    Normal Values  Rhythm: SR  HR:   Measurements: 0.12-0.20/0.08-0.10/0.30-0.52

## 2023-06-13 NOTE — PROGRESS NOTES
Monitor summary:     Rhythm : SR  Rate : 64-81  Ectopy : F PVC // R BIG and TRIG    LA=.18  QRS=.08  QT=.38        Per telemetry strip summary from monitor room.

## 2023-06-13 NOTE — CARE PLAN
"The patient is Stable - Low risk of patient condition declining or worsening    Shift Goals  Clinical Goals: ABX and pain control  Patient Goals: pain control and \"fix my finger\"  Family Goals: KATHERINE    Progress made toward(s) clinical / shift goals:        Problem: Pain - Standard  Goal: Alleviation of pain or a reduction in pain to the patient’s comfort goal  Outcome: Progressing     Problem: Knowledge Deficit - Standard  Goal: Patient and family/care givers will demonstrate understanding of plan of care, disease process/condition, diagnostic tests and medications  Outcome: Progressing     Problem: Respiratory  Goal: Patient will achieve/maintain optimum respiratory ventilation and gas exchange  Outcome: Progressing       Patient is not progressing towards the following goals:      "

## 2023-06-13 NOTE — DISCHARGE PLANNING
Received Choice form at: [id number]  Agency/Facility name: Local SNF[facility]  Referral sent per Choice form at: [phone]

## 2023-06-13 NOTE — ANESTHESIA PREPROCEDURE EVALUATION
Case: [id number] Date/Time: 06/12/23 1611    Procedure: INCISION AND DRAINAGE, HAND - THUMB (Right: Thumb)    Location:  OR 04 / SURGERY [facility]    Surgeons: Ace Shaw M.D.          Relevant Problems   ENDO   (positive) Diabetes mellitus type 2, insulin dependent (HCC)       Physical Exam    Airway   Mallampati: II  TM distance: >3 FB  Neck ROM: full       Cardiovascular - normal exam  Rhythm: regular  Rate: normal     Dental - normal exam           Pulmonary - normal exam     Abdominal    Neurological - normal exam                 Anesthesia Plan    ASA 3   ASA physical status 3 criteria: diabetes - poorly controlled    Plan - MAC               Induction: intravenous    Postoperative Plan: Postoperative administration of opioids is intended.    Pertinent diagnostic labs and testing reviewed    Informed Consent:    Anesthetic plan and risks discussed with patient.    Use of blood products discussed with: patient whom consented to blood products.

## 2023-06-13 NOTE — CARE PLAN
The patient is Stable - Low risk of patient condition declining or worsening    Shift Goals  Clinical Goals: wound care, pain control  Patient Goals: food and pain meds  Family Goals: KATHERINE    Progress made toward(s) clinical / shift goals:    Problem: Pain - Standard  Goal: Alleviation of pain or a reduction in pain to the patient’s comfort goal  Outcome: Progressing   Pain treated with meds.   Problem: Knowledge Deficit - Standard  Goal: Patient and family/care givers will demonstrate understanding of plan of care, disease process/condition, diagnostic tests and medications  Outcome: Progressing   POC discussed with pt. Pt states understanding.     Patient is not progressing towards the following goals:

## 2023-06-13 NOTE — OR NURSING
1817 : To PACU from OR, report received from anesthesia and RN. Patient awake, respirations are spontaneous and unlabored. VSS on 2L. Dressing is CDI. Right hand elevated. Cold pack applied. CMS: KATHERINE radial pulse due to bulky dressing, brisk cap refill present, warm to touch.    1830: Patient denies pain and nausea. Patient tolerating PO fluids. Patient's son updated.     1845: Patient denies pain and nausea. Meets criteria to transfer to floor.     1900: No changes.     1915: Patient denies pain and nausea. No change in surgical site assessment.     1920: Report to PENNIE Padilla.     1926: Transferred on O2 tank @ 465.

## 2023-06-14 ENCOUNTER — APPOINTMENT (OUTPATIENT)
Dept: RADIOLOGY | Facility: MEDICAL CENTER | Age: 58
DRG: 628 | End: 2023-06-14
Attending: INTERNAL MEDICINE
Payer: COMMERCIAL

## 2023-06-14 LAB
ANION GAP SERPL CALC-SCNC: 5 MMOL/L (ref 7–16)
BACTERIA BLD CULT: NORMAL
BACTERIA BLD CULT: NORMAL
BUN SERPL-MCNC: 10 MG/DL (ref 8–22)
CALCIUM SERPL-MCNC: 8 MG/DL (ref 8.4–10.2)
CHLORIDE SERPL-SCNC: 102 MMOL/L (ref 96–112)
CO2 SERPL-SCNC: 31 MMOL/L (ref 20–33)
CREAT SERPL-MCNC: 0.49 MG/DL (ref 0.5–1.4)
ERYTHROCYTE [DISTWIDTH] IN BLOOD BY AUTOMATED COUNT: 39.3 FL (ref 35.9–50)
GFR SERPLBLD CREATININE-BSD FMLA CKD-EPI: 118 ML/MIN/1.73 M 2
GLUCOSE BLD STRIP.AUTO-MCNC: 159 MG/DL (ref 65–99)
GLUCOSE BLD STRIP.AUTO-MCNC: 171 MG/DL (ref 65–99)
GLUCOSE BLD STRIP.AUTO-MCNC: 208 MG/DL (ref 65–99)
GLUCOSE BLD STRIP.AUTO-MCNC: 227 MG/DL (ref 65–99)
GLUCOSE SERPL-MCNC: 119 MG/DL (ref 65–99)
HCT VFR BLD AUTO: 32.3 % (ref 42–52)
HGB BLD-MCNC: 10.5 G/DL (ref 14–18)
MCH RBC QN AUTO: 28.1 PG (ref 27–33)
MCHC RBC AUTO-ENTMCNC: 32.5 G/DL (ref 32.3–36.5)
MCV RBC AUTO: 86.4 FL (ref 81.4–97.8)
PLATELET # BLD AUTO: 190 K/UL (ref 164–446)
PMV BLD AUTO: 8.8 FL (ref 9–12.9)
POTASSIUM SERPL-SCNC: 4.1 MMOL/L (ref 3.6–5.5)
RBC # BLD AUTO: 3.74 M/UL (ref 4.7–6.1)
SIGNIFICANT IND 70042: NORMAL
SIGNIFICANT IND 70042: NORMAL
SITE SITE: NORMAL
SITE SITE: NORMAL
SODIUM SERPL-SCNC: 138 MMOL/L (ref 135–145)
SOURCE SOURCE: NORMAL
SOURCE SOURCE: NORMAL
WBC # BLD AUTO: 6.3 K/UL (ref 4.8–10.8)

## 2023-06-14 PROCEDURE — C1751 CATH, INF, PER/CENT/MIDLINE: HCPCS

## 2023-06-14 PROCEDURE — 700105 HCHG RX REV CODE 258: Performed by: STUDENT IN AN ORGANIZED HEALTH CARE EDUCATION/TRAINING PROGRAM

## 2023-06-14 PROCEDURE — A9270 NON-COVERED ITEM OR SERVICE: HCPCS | Performed by: STUDENT IN AN ORGANIZED HEALTH CARE EDUCATION/TRAINING PROGRAM

## 2023-06-14 PROCEDURE — 770020 HCHG ROOM/CARE - TELE (206)

## 2023-06-14 PROCEDURE — 700111 HCHG RX REV CODE 636 W/ 250 OVERRIDE (IP): Performed by: HOSPITALIST

## 2023-06-14 PROCEDURE — 700105 HCHG RX REV CODE 258: Performed by: INTERNAL MEDICINE

## 2023-06-14 PROCEDURE — 99232 SBSQ HOSP IP/OBS MODERATE 35: CPT | Performed by: INTERNAL MEDICINE

## 2023-06-14 PROCEDURE — 80048 BASIC METABOLIC PNL TOTAL CA: CPT

## 2023-06-14 PROCEDURE — 36415 COLL VENOUS BLD VENIPUNCTURE: CPT

## 2023-06-14 PROCEDURE — 02HV33Z INSERTION OF INFUSION DEVICE INTO SUPERIOR VENA CAVA, PERCUTANEOUS APPROACH: ICD-10-PCS | Performed by: INTERNAL MEDICINE

## 2023-06-14 PROCEDURE — 700111 HCHG RX REV CODE 636 W/ 250 OVERRIDE (IP): Performed by: STUDENT IN AN ORGANIZED HEALTH CARE EDUCATION/TRAINING PROGRAM

## 2023-06-14 PROCEDURE — 94760 N-INVAS EAR/PLS OXIMETRY 1: CPT

## 2023-06-14 PROCEDURE — 700102 HCHG RX REV CODE 250 W/ 637 OVERRIDE(OP): Performed by: STUDENT IN AN ORGANIZED HEALTH CARE EDUCATION/TRAINING PROGRAM

## 2023-06-14 PROCEDURE — 85027 COMPLETE CBC AUTOMATED: CPT

## 2023-06-14 PROCEDURE — 82962 GLUCOSE BLOOD TEST: CPT | Mod: 91

## 2023-06-14 PROCEDURE — 700111 HCHG RX REV CODE 636 W/ 250 OVERRIDE (IP): Performed by: INTERNAL MEDICINE

## 2023-06-14 RX ADMIN — ENOXAPARIN SODIUM 40 MG: 100 INJECTION SUBCUTANEOUS at 17:29

## 2023-06-14 RX ADMIN — DAKIN'S SOLUTION 0.125% (QUARTER STRENGTH) 473 ML: 0.12 SOLUTION at 17:23

## 2023-06-14 RX ADMIN — AMPICILLIN SODIUM AND SULBACTAM SODIUM 3 G: 2; 1 INJECTION, POWDER, FOR SOLUTION INTRAMUSCULAR; INTRAVENOUS at 17:21

## 2023-06-14 RX ADMIN — INSULIN LISPRO 4 UNITS: 100 INJECTION, SOLUTION INTRAVENOUS; SUBCUTANEOUS at 17:25

## 2023-06-14 RX ADMIN — AMPICILLIN SODIUM AND SULBACTAM SODIUM 3 G: 2; 1 INJECTION, POWDER, FOR SOLUTION INTRAMUSCULAR; INTRAVENOUS at 23:40

## 2023-06-14 RX ADMIN — OXYCODONE 5 MG: 5 TABLET ORAL at 17:33

## 2023-06-14 RX ADMIN — INSULIN GLARGINE-YFGN 10 UNITS: 100 INJECTION, SOLUTION SUBCUTANEOUS at 06:08

## 2023-06-14 RX ADMIN — AMPICILLIN SODIUM AND SULBACTAM SODIUM 3 G: 2; 1 INJECTION, POWDER, FOR SOLUTION INTRAMUSCULAR; INTRAVENOUS at 05:04

## 2023-06-14 RX ADMIN — INSULIN LISPRO 3 UNITS: 100 INJECTION, SOLUTION INTRAVENOUS; SUBCUTANEOUS at 21:49

## 2023-06-14 RX ADMIN — DOXYCYCLINE 100 MG: 100 TABLET, FILM COATED ORAL at 17:32

## 2023-06-14 RX ADMIN — INSULIN GLARGINE-YFGN 10 UNITS: 100 INJECTION, SOLUTION SUBCUTANEOUS at 17:23

## 2023-06-14 RX ADMIN — DOXYCYCLINE 100 MG: 100 TABLET, FILM COATED ORAL at 05:02

## 2023-06-14 RX ADMIN — AMPICILLIN SODIUM AND SULBACTAM SODIUM 3 G: 2; 1 INJECTION, POWDER, FOR SOLUTION INTRAMUSCULAR; INTRAVENOUS at 12:02

## 2023-06-14 RX ADMIN — DAKIN'S SOLUTION 0.125% (QUARTER STRENGTH) 150 ML: 0.12 SOLUTION at 05:02

## 2023-06-14 RX ADMIN — INSULIN LISPRO 4 UNITS: 100 INJECTION, SOLUTION INTRAVENOUS; SUBCUTANEOUS at 12:08

## 2023-06-14 RX ADMIN — SODIUM CHLORIDE, POTASSIUM CHLORIDE, SODIUM LACTATE AND CALCIUM CHLORIDE: 600; 310; 30; 20 INJECTION, SOLUTION INTRAVENOUS at 06:42

## 2023-06-14 RX ADMIN — INSULIN LISPRO 3 UNITS: 100 INJECTION, SOLUTION INTRAVENOUS; SUBCUTANEOUS at 06:08

## 2023-06-14 ASSESSMENT — PAIN DESCRIPTION - PAIN TYPE
TYPE: ACUTE PAIN
TYPE: ACUTE PAIN

## 2023-06-14 ASSESSMENT — ENCOUNTER SYMPTOMS
CHILLS: 0
FEVER: 0
COUGH: 0
NERVOUS/ANXIOUS: 0
DEPRESSION: 0
HEADACHES: 0
CLAUDICATION: 0
CONSTIPATION: 0
PHOTOPHOBIA: 0
INSOMNIA: 0
DIARRHEA: 0
WEAKNESS: 0
BLURRED VISION: 0
MYALGIAS: 0
VOMITING: 0
NAUSEA: 0
ABDOMINAL PAIN: 0
SHORTNESS OF BREATH: 0
SPEECH CHANGE: 0
HEARTBURN: 0
DIZZINESS: 0
SENSORY CHANGE: 0

## 2023-06-14 ASSESSMENT — FIBROSIS 4 INDEX
FIB4 SCORE: 0.84
FIB4 SCORE: 0.84

## 2023-06-14 NOTE — PROGRESS NOTES
[facility] Medicine Daily Progress Note    Date of Service  6/14/2023    Chief Complaint  Christoph Taylor is a 58 y.o. male admitted 6/8/2023 with abdominal pain right finger pain and swelling    Hospital Course  Patient is a 58-year-old male with history of diabetes presented on 6 8 with abdominal pain and right finger pain and swelling.  He has been feeling poorly over the last week and worse over the last 2 days prior to admission.  Worsening abdominal pain in the epigastric region nausea without diarrhea.  Patient was initially admitted to the medical floor however was transferred to the ICU secondary to syncope and the development of DKA with an anion gap of 27.  He was successfully treated with insulin drip IV fluids blood glucoses improved and his gap is closed and was transferred out of the intensive care unit the next day.  He continued to have significant pain and erythema at the right thumb and MRI showed concern of osteomyelitis therefore orthopedic surgery was consulted and he was taken to the OR for debridement as well as bone biopsy to see if osteomyelitis is in fact present.  The pathology from the biopsy is currently pending, if it is present then we will discuss further with infectious disease if it is not then his course of antibiotics will be far less.    Interval Problem Update  6/13 patient is feeling better today as he is postop day 1 from I&D of the right thumb.  He did have biopsy taken in the operating room and this is currently pending pathology review to see if osteomyelitis is present.  If this is I will discuss further with infectious disease.  His abdominal pain has since resolved and his appetite has returned and he is feeling much better and I suspect that his symptoms were more related to a systemic reaction related to his infection and development of DKA then a fulminant infectious enteritis.  6/14 patient states that he feels constantly hungry and he still has significant right  thumb pain.  He is postop day 2 from debridement surgery.  Pathology from the biopsy taken during surgery unfortunately does confirm the presence of osteomyelitis.  Orthopedic surgery did not wish to take the remainder of the thumb due to high morbidity.  Given the confirmation of osteomyelitis I have requested infectious disease consultation.  Patient has had negative blood cultures and midline will be placed in anticipation of prolonged course of antibiotics for his osteomyelitis.    I have discussed this patient's plan of care and discharge plan at IDT rounds today with Case Management, Nursing, Nursing leadership, and other members of the IDT team.    Consultants/Specialty  infectious disease and orthopedics    Code Status  Full Code    Disposition  The patient is not medically cleared for discharge to home or a post-acute facility.  Anticipate discharge to: skilled nursing facility    I have placed the appropriate orders for post-discharge needs.    Review of Systems  Review of Systems   Constitutional:  Negative for chills and fever.   HENT:  Negative for congestion.    Eyes:  Negative for blurred vision and photophobia.   Respiratory:  Negative for cough and shortness of breath.    Cardiovascular:  Negative for chest pain, claudication and leg swelling.   Gastrointestinal:  Negative for abdominal pain, constipation, diarrhea, heartburn, nausea and vomiting.   Genitourinary:  Negative for dysuria and hematuria.   Musculoskeletal:  Negative for joint pain and myalgias.        Right thumb pain   Skin:  Negative for itching and rash.   Neurological:  Negative for dizziness, sensory change, speech change, weakness and headaches.   Psychiatric/Behavioral:  Negative for depression. The patient is not nervous/anxious and does not have insomnia.         Physical Exam  Temp:  [36.4 °C (97.5 °F)-37 °C (98.6 °F)] 36.8 °C (98.3 °F)  Pulse:  [67-87] 67  Resp:  [16-18] 18  BP: ()/(60-71) 104/67  SpO2:  [89 %-98 %] 91  %    Physical Exam  Vitals and nursing note reviewed.   Constitutional:       General: He is not in acute distress.     Appearance: Normal appearance.   HENT:      Head: Normocephalic and atraumatic.   Eyes:      General: No scleral icterus.     Extraocular Movements: Extraocular movements intact.   Cardiovascular:      Rate and Rhythm: Normal rate and regular rhythm.      Pulses: Normal pulses.      Heart sounds: Normal heart sounds. No murmur heard.  Pulmonary:      Effort: Pulmonary effort is normal. No respiratory distress.      Breath sounds: Normal breath sounds. No wheezing, rhonchi or rales.   Abdominal:      General: Abdomen is flat. Bowel sounds are normal. There is no distension.      Palpations: Abdomen is soft.      Tenderness: There is no right CVA tenderness or rebound.   Musculoskeletal:         General: No swelling or tenderness.      Cervical back: Normal range of motion and neck supple.   Lymphadenopathy:      Cervical: No cervical adenopathy.   Skin:     Coloration: Skin is not jaundiced.      Findings: No erythema.   Neurological:      General: No focal deficit present.      Mental Status: He is alert and oriented to person, place, and time. Mental status is at baseline.      Cranial Nerves: No cranial nerve deficit.   Psychiatric:         Mood and Affect: Mood normal.         Behavior: Behavior normal.         Fluids    Intake/Output Summary (Last 24 hours) at 6/14/2023 1348  Last data filed at 6/14/2023 1000  Gross per 24 hour   Intake 1060 ml   Output 1600 ml   Net -540 ml         Laboratory  Recent Labs     06/12/23  0145 06/13/23  0018 06/14/23  0345   WBC 7.2 7.0 6.3   RBC 3.77* 3.76* 3.74*   HEMOGLOBIN 10.5* 10.6* 10.5*   HEMATOCRIT 32.4* 32.2* 32.3*   MCV 85.9 85.6 86.4   MCH 27.9 28.2 28.1   MCHC 32.4 32.9 32.5   RDW 39.5 39.0 39.3   PLATELETCT 193 173 190   MPV 9.1 8.6* 8.8*       Recent Labs     06/12/23  0145 06/13/23  0018 06/14/23  0345   SODIUM 133* 135 138   POTASSIUM 3.7 3.7 4.1    CHLORIDE 97 96 102   CO2 31 29 31   GLUCOSE 176* 169* 119*   BUN 8 7* 10   CREATININE 0.49* 0.45* 0.49*   CALCIUM 7.7* 7.7* 8.0*                     Imaging  MR-HAND - WITH & W/O RIGHT   Final Result      1.  Marrow edema, enhancement, and erosive change of the first distal phalanx with likely intramedullary gas. This most likely represents presence of osteomyelitis.      2.  Mild marrow edema and enhancement of the first proximal phalanx which may be reactive in nature versus representative of early osteomyelitis.      3.  Cellulitis involves the thumb distally to the greatest degree. This also extends proximally into the thenar area and extends along the dorsal and volar aspects of the hand.      4.  Multifocal osteoarthritis.      CT-HAND WITH RIGHT   Final Result      1.  Erosive change of the tuft of the first distal phalanx with osteopenia involving the remainder the phalanx with some intramedullary gas. This likely represents presence of osteomyelitis.      2.  Cellulitis involving the thumb most prominently distally. No focal fluid collection to suggest abscess.      3.  Cellulitis involving the remainder of the hand and wrist.      EC-ECHOCARDIOGRAM COMPLETE W/O CONT   Final Result      DX-CHEST-PORTABLE (1 VIEW)   Final Result         1.  Left basilar atelectasis, no focal infiltrate      CT-ABDOMEN-PELVIS WITH   Final Result      1.  Horseshoe kidney with mild hydronephrosis, possibly related to bladder distention. No obstructing stone seen.   2.  Small, nonobstructing renal stones.   3.  Features suggestive of enteritis. No evidence of obstruction.   4.  Patchy consolidations of the left lower lobe, likely infectious or inflammatory.      IR-MIDLINE CATHETER INSERTION WO GUIDANCE > AGE 3    (Results Pending)          Assessment/Plan  * Osteomyelitis of right hand (HCC)- (present on admission)  Assessment & Plan  Patient is status post I&D of the right thumb on 612  Pathology report resulted today on  614, osteomyelitis is present on biopsy taken during surgery  Will discuss with infectious disease for consultation  Interoperative culture showing MSSA, sensitivities pending      Elevated troponin  Assessment & Plan  Troponin was slightly elevated and trending down, 29> 24,   denies chest pain,   EKG I personally reviewed,  no acute ischemic ST-T changes.      Likely demand ischemia in the setting of DKA    Enteritis  Assessment & Plan  Tolerating diet without issue  Abdominal pain has resolved    Acute metabolic encephalopathy  Assessment & Plan  Resolved    Syncope  Assessment & Plan  Likely related to DKA and hypovolemia  Symptoms have since resolved    AP (abdominal pain)- (present on admission)  Assessment & Plan  Resolved with DKA treatment    Dyslipidemia- (present on admission)  Assessment & Plan  Cardiac diet when able to tolerate orally    Uncontrolled type 2 diabetes mellitus with hyperglycemia (HCC)- (present on admission)  Assessment & Plan  With marked hyperglycemia  Last glycated hemoglobin was 15.2% indicating a very poor control  I will start Premeal, long & short acting insulin for now  Accu-Checks, hypoglycemia protocol  Adjust according to blood sugars trend     6/10 continue Lantus at 10 units twice daily, SSI      Metabolic acidosis, increased anion gap- (present on admission)  Assessment & Plan  Likely multifactorial due to reduced intravascular volume/hyperglycemia/dehydration, infection/colitis  Anticipate improvement with intravenous fluids and initiating insulin, continue to monitor    Diabetic ketoacidosis without coma (HCC)  Assessment & Plan  Resolved, out of ICU  Likely due to underlying infection  Continue insulin and close monitor         VTE prophylaxis: SCDs/TEDs    I have performed a physical exam and reviewed and updated ROS and Plan today (6/14/2023). In review of yesterday's note (6/13/2023), there are no changes except as documented above.

## 2023-06-14 NOTE — DISCHARGE PLANNING
Case Management Discharge Planning    Admission Date: 6/8/2023  GMLOS: 3.9  ALOS: 6    6-Clicks ADL Score: 22  6-Clicks Mobility Score: 18    Anticipated Discharge Dispo: Discharge Disposition: D/T to SNF with Medicare cert in anticipation of skilled care (03)    DME Needed: No    Action(s) Taken: Updated Provider/Nurse on Discharge Plan  Patient discussed during morning IDT rounds with team. Patient is not medically cleared for discharge at this time. SW met with patient at bedside. Per rounds, pending pathology results/recommendations. SW informed patient he was accepted at both [facility] and [facility]. Patient said he prefer to attend [facility] as it was closer to home.     [facility] [phone]    Escalations Completed: None    Medically Clear: No    Next Steps: Pathology results     Barriers to Discharge: Medical clearance    Is the patient up for discharge tomorrow: No

## 2023-06-14 NOTE — PROGRESS NOTES
Telemetry Shift Summary     Rhythm: SR  HR: 68-80  Ectopy: rPVC  Measurements: 0.18/0.10/0.38       Normal Values  Rhythm: SR  HR:   Measurements: 0.12-0.20 / 0.04-0.10 / 0.30-0.52

## 2023-06-14 NOTE — WOUND TEAM
"[facility]  Inpatient Services  Initial Wound and Skin Care Evaluation    Admission Date: 6/8/2023     Last order of IP CONSULT TO WOUND CARE was found on 6/13/2023 from Hospital Encounter on 6/8/2023     HPI, PMH, SH: Reviewed    Past Surgical History:   Procedure Laterality Date    FINGER OR HAND INCISION AND DRAINAGE Right 6/12/2023    Procedure: INCISION AND DRAINAGE, HAND - THUMB;  Surgeon: Ace Shaw M.D.;  Location: SURGERY [facility];  Service: Orthopedics    NV UPPER GI ENDOSCOPY,DIAGNOSIS N/A 3/14/2023    Procedure: GASTROSCOPY;  Surgeon: Atilio Freed M.D.;  Location: SURGERY [facility];  Service: Gastroenterology    OTHER      appy, lumbar fusion     Social History     Tobacco Use    Smoking status: Former    Smokeless tobacco: Never   Vaping Use    Vaping Use: Not on file   Substance Use Topics    Alcohol use: Not Currently     Chief Complaint   Patient presents with    Epigastric Pain     Pt reports pain above his belly button that has been there for a a \"few days\", pt has history of abd pain with GI follow up to happen 6/15  Pt states that over the last few days he has had three episodes of vomiting   Pt was initially hypotensive with pressure 70/30 upon EMS arrival post 1L IV fluids improved to 98/66 and 5 mg of morphine was given. Pt than became hypoxic requiring 3L NC. ERP called to bedside due to pt presentation        Diagnosis: Cellulitis [L03.90]    Unit where seen by Wound Team: 3316/01     WOUND CONSULT/FOLLOW UP RELATED TO:  R hand     WOUND HISTORY:  Pt has I&D 6/12. POD 1 for drsg change. Per pt he develops callus and uses foot heidi to file down callus. Callus aren't always present.     WOUND ASSESSMENT/LDA  Wound 06/12/23 Incision Finger, Thumb Right 4x4, nathaly, coban  (Active)     Thumb distal     Dorsal thumb, web space     4th finger 06/13/23 2000   Site Assessment Red;Purple    Periwound Assessment Dry;Callused;Edema    Margins Defined edges    Closure " Secondary intention    Drainage Amount Scant    Drainage Description Serosanguineous    Treatments Cleansed;Moisturizing cream    Wound Cleansing Dakin's Solution    Periwound Protectant Not Applicable    Dressing Cleansing/Solutions Not Applicable    Dressing Options Dry Gauze;Gary;Coban;Plain Strip Packing    Dressing Changed Changed    Dressing Status Intact    Dressing Change/Treatment Frequency Every Shift, and As Needed    NEXT Dressing Change/Treatment Date 06/14/23    NEXT Weekly Photo (Inpatient Only) 06/20/23    Non-staged Wound Description Full thickness 06/13/23 2000   Wound Length (cm) 0.4 cm web space; thumb 0.3; 4th finger 0.5 06/13/23 2000   Wound Width (cm) 2 cm web space; thumb 0.5; 4th finger 0.5 06/13/23 2000   Wound Depth (cm) 0.3 cm web space; thumb 0.3; 4th finger scab 06/13/23 2000   Wound Surface Area (cm^2) 0.8 cm^2 06/13/23 2000   Wound Volume (cm^3) 0.24 cm^3 06/13/23 2000   Shape circular and linear    Wound Odor None    Pulses 1+    Exposed Structures KATHERINE    Number of days: 1        Vascular:    MARCO A:   No results found.    Lab Values:    Lab Results   Component Value Date/Time    WBC 7.0 06/13/2023 12:18 AM    RBC 3.76 (L) 06/13/2023 12:18 AM    HEMOGLOBIN 10.6 (L) 06/13/2023 12:18 AM    HEMATOCRIT 32.2 (L) 06/13/2023 12:18 AM    CREACTPROT 1.89 (H) 06/12/2023 01:45 AM    SEDRATEWES 20 06/12/2023 01:45 AM    HBA1C 15.2 (H) 03/12/2023 01:46 AM        Culture Results show:  No results found for this or any previous visit (from the past 720 hour(s)).    Pain Level/Medicated:  PO pain medications administered by bedside RN 60 min prior       INTERVENTIONS BY WOUND TEAM:  Chart and images reviewed. Discussed with bedside RN. All areas of concern (based on picture review, LDA review and discussion with bedside RN) have been thoroughly assessed. Documentation of areas based on significant findings. This RN in to assess patient. Performed standard wound care which includes appropriate  positioning, dressing removal and non-selective debridement. Pictures and measurements obtained weekly if/when required.  Preparation for Dressing removal: Dressing soaked with Saline  Non-selectively Debrided with:  Dakins and Gauze   Sharp debridement: using scissors and forceps trimmed loose skin around dorsal thumb revealing red/pink wound with purulent drainage. Some pain but tolerated, no bleeding present.   Jeni wound: Cleansed with Normal Saline and Dakins, Prepped with  moisturizer  Primary Dressing:  dry gauze, nathaly   Secondary (Outer) Dressing:  coban    Advanced Wound Care Discharge Planning  Number of Clinicians necessary to complete wound care: 1  Is patient requiring IV pain medications for dressing changes: no  Length of time for dressing change 45 min. (This does not include chart review, pre-medication time, set up, clean up or time spent charting.)    Interdisciplinary consultation: Patient, Bedside RN      EVALUATION / RATIONALE FOR TREATMENT:  Most Recent Date:  6/13: Pt had Sx yesterday for I&D R thumb. Discussed drsg care with  earlier today and received orders. Was able to tuck small piece of strip packing into distal thumb. Dorsal thumb with purulence, unable to probe depth. Dakin's Solution® Quarter Strength is a broad-spectrum antimicrobial cleanser that is gentle to the skin. Effective against MRSA, VRE, other bacteria, viruses, molds, fungi and yeast. Also used for odor control.     Goals: Slow steady decrease in wound area and depth weekly.    WOUND TEAM PLAN OF CARE ([X] for frequency of wound follow up,):   Nursing to follow dressing orders written for wound care. Contact wound team if area fails to progress, deteriorates or with any questions/concerns if something comes up before next scheduled follow up (See below as to whether wound is following and frequency of wound follow up)  Dressing changes by wound team:                   Follow up 3 times weekly:                NPWT  change 3 times weekly:     Follow up 1-2 times weekly:  x    Follow up Bi-Monthly:           Follow up Monthly (High Risk):                        Follow up as needed:     Other (explain):     NURSING PLAN OF CARE ORDERS (X):  Dressing changes: See Dressing Care orders: x  Skin care: See Skin Care orders:   RN Prevention Protocol:   Rectal tube care: See Rectal Tube Care orders:   Other orders:    RSKIN:   CURRENTLY IN PLACE (X), APPLIED THIS VISIT (A), ORDERED (O):   Q shift Oscar:  X  Q shift pressure point assessments:  X    Surface/Positioning   Standard Mattress/Trauma Bed   x        Low Airloss          ICU Low Airloss   Bariatric QUETA     Waffle cushion        Waffle Overlay          Reposition q 2 hours   pt can perform   TAPs Turning system     Z Richar Pillow     Offloading/Redistribution not assessed  Sacral Offloading Dressing (Silicone dressing)     Heel Offloading Dressing (Silicone dressing)         Heel float boots (Prevalon boot)             Float Heels off Bed with Pillows           Respiratory RA  Silicone O2 tubing         Gray Foam Ear protectors     Cannula fixation Device (Tender )          High flow offloading Clip    Elastic head band offloading device      Anchorfast                                                         Trach with Optifoam split foam             Containment/Moisture Prevention not assessed    Rectal tube or BMS    Purwick/Condom Cath        Crowley Catheter    Barrier wipes           Barrier paste       Antifungal tx      Interdry        Mobilization not assessed        Up to chair        Ambulate      PT/OT      Nutrition       Dietician        Diabetes Education      PO  x   TF     TPN     NPO   # days     Other   low fiber and diabetic     Anticipated discharge plans: TBD will need drsg changes  LTACH:        SNF/Rehab:                  Home Health Care:           Outpatient Wound Center:            Self/Family Care:        Other:                  Vac Discharge Needs:    Vac Discharge plan is purely a recommendation from wound team and not a requirement for discharge unless otherwise stated by physician.  Not Applicable Pt not on a wound vac:  x     Regular Vac while inpatient, alternative dressing at DC:        Regular Vac in use and continued at DC:            Reg. Vac w/ Skin Sub/Biologic in use. Will need to be changed 2x wkly:      Veraflo Vac while inpatient, ok to transition to Regular Vac on Discharge (Bedside RN to Clamp small instillation tubing at time of DC):           Veraflo Vac while inpatient, would benefit from remaining on Veraflo Vac upon discharge:

## 2023-06-14 NOTE — CARE PLAN
Problem: Pain - Standard  Goal: Alleviation of pain or a reduction in pain to the patient’s comfort goal  Outcome: Progressing     Problem: Knowledge Deficit - Standard  Goal: Patient and family/care givers will demonstrate understanding of plan of care, disease process/condition, diagnostic tests and medications  Outcome: Progressing     Problem: Skin Integrity  Goal: Skin integrity is maintained or improved  Outcome: Progressing   The patient is Stable - Low risk of patient condition declining or worsening    Shift Goals  Clinical Goals: pain control, wound care  Patient Goals: pain control  Family Goals: vicente    Progress made toward(s) clinical / shift goals:  updated pt on plan of care. Pending pathology reports for final IV abx recommendations. Willl perform dressing care for wound.     Patient is not progressing towards the following goals:

## 2023-06-14 NOTE — ANESTHESIA TIME REPORT
Anesthesia Start and Stop Event Times     Date Time Event    6/12/2023 1754 Ready for Procedure     1754 Anesthesia Start     1818 Anesthesia Stop        Responsible Staff  06/12/23    Name Role Begin End    Tony Ramírez M.D. Anesth 1754 1818        Overtime Reason:  no overtime (within assigned shift)    Comments:

## 2023-06-14 NOTE — ANESTHESIA POSTPROCEDURE EVALUATION
Patient: Christoph Taylor    Procedure Summary     Date: 06/12/23 Room / Location:  OR  / SURGERY [facility]    Anesthesia Start: 1754 Anesthesia Stop: 1818    Procedure: INCISION AND DRAINAGE, HAND - THUMB (Right: Thumb) Diagnosis: (Right thumb infection)    Surgeons: Ace Shaw M.D. Responsible Provider: Tony Ramírez M.D.    Anesthesia Type: MAC ASA Status: 3          Final Anesthesia Type: MAC  Last vitals  BP   Blood Pressure: 97/63    Temp   37 °C (98.6 °F)    Pulse   79   Resp   18    SpO2   89 %      Anesthesia Post Evaluation    Patient location during evaluation: PACU  Patient participation: complete - patient participated  Level of consciousness: awake and alert    Airway patency: patent  Anesthetic complications: no  Cardiovascular status: hemodynamically stable  Respiratory status: acceptable  Hydration status: euvolemic    PONV: none          No notable events documented.     Nurse Pain Score: 8 (NPRS)

## 2023-06-14 NOTE — CARE PLAN
The patient is Stable - Low risk of patient condition declining or worsening    Shift Goals  Clinical Goals: Pain control and glucose monitoring  Patient Goals: food and pain control  Family Goals: KATHERINE    Progress made toward(s) clinical / shift goals:        Problem: Pain - Standard  Goal: Alleviation of pain or a reduction in pain to the patient’s comfort goal  Outcome: Progressing     Problem: Knowledge Deficit - Standard  Goal: Patient and family/care givers will demonstrate understanding of plan of care, disease process/condition, diagnostic tests and medications  Outcome: Progressing     Problem: Fall Risk  Goal: Patient will remain free from falls  Outcome: Progressing

## 2023-06-14 NOTE — PROCEDURES
Vascular Access Team     Date of Insertion: 6/14/23  Arm Circumference: 30  Internal length: 45  External Length: 1  Vein Occupancy %: 32   Reason for PICC: 6 weeks antibiotics  Labs: WBC 6.3, , BUN 10, Cr 0.49, , INR N/A     Consents confirmed, vessel patency confirmed with ultrasound. Risks and benefits of procedure explained to patient and son and education regarding central line associated bloodstream infections provided. Questions answered.      PICC placed in LUE per licensed provider order with ultrasound guidance.  4 Fr, single lumen PICC placed in basilic vein after 1 attempt(s). 2 mL of 1% lidocaine injected intradermally at the insertion site. A 21 gauge microintroducer needle was visualized entering the vein and modified Seldinger technique was used to obtain access to the vein. 45 cm catheter inserted and brisk blood return was observed from each lumen upon aspiration. Line secured at the 1 cm marker. TCS stylet removed and observed to be fully intact. Each lumen flushed using pulsatile method without resistance with 10 mL 0.9% normal saline. PICC line secured with Biopatch and Tegaderm.     PICC tip placement location is confirmed by nurse to be in the Superior Vena Cava (SVC) utilizing 3CG technology. PICC line is appropriate for use at this time. Patient tolerated procedure well, without complications.  Patient condition relayed to primary RN or ordering physician via this post procedure note in the EMR.      Ultrasound images uploaded to PACS and viewable in the EMR - yes  Ultrasound imaged printed and placed in paper chart - no     [employer] PICC ref # [id number], Lot # [id number], Expiration Date 03/31/2024

## 2023-06-14 NOTE — CARE PLAN
The patient is Stable - Low risk of patient condition declining or worsening    Shift Goals  Clinical Goals: pain mgt, wound care  Patient Goals: pain mgt  Family Goals: vicente    Progress made toward(s) clinical / shift goals:      Problem: Pain - Standard  Goal: Alleviation of pain or a reduction in pain to the patient’s comfort goal  Description: Target End Date:  Prior to discharge or change in level of care    Document on Vitals flowsheet    1.  Document pain using the appropriate pain scale per order or unit policy  2.  Educate and implement non-pharmacologic comfort measures (i.e. relaxation, distraction, massage, cold/heat therapy, etc.)  3.  Pain management medications as ordered  4.  Reassess pain after pain med administration per policy  5.  If opiods administered assess patient's response to pain medication is appropriate per POSS sedation scale  6.  Follow pain management plan developed in collaboration with patient and interdisciplinary team (including palliative care or pain specialists if applicable)  Outcome: Progressing  Note: Pt reports pain relief with current pain medication regimen     Problem: Fluid Volume  Goal: Fluid volume balance will be maintained  Description: Target End Date:  Prior to discharge or change in level of care    Document on I/O flowsheet    1.  Monitor intake and output as ordered  2.  Promote oral intake as appropriate  3.  Report inadequate intake or output to physician  4.  Administer IV therapy as ordered  5.  Weights per provider order  6.  Assess for signs and symptoms of bleeding  7.  Monitor for signs of fluid overload (respiratory changes, edema, weight gain, increased abdominal girth)  8.  Monitor of signs for inadequate fluid volume (poor skin turgor, dry mucous membranes)  9.  Instruct patient on adherence to fluid restrictions  Outcome: Progressing  Note: Pt receiving IV fluids     Problem: Fall Risk  Goal: Patient will remain free from falls  Description: Target  End Date:  Prior to discharge or change in level of care    Document interventions on the Lizette Marroquin Fall Risk Assessment    1.  Assess for fall risk factors  2.  Implement fall precautions  Outcome: Progressing  Note: Bed low and locked, bed alarm on, call light within reach       Patient is not progressing towards the following goals:

## 2023-06-15 LAB
ANION GAP SERPL CALC-SCNC: 8 MMOL/L (ref 7–16)
BUN SERPL-MCNC: 13 MG/DL (ref 8–22)
CALCIUM SERPL-MCNC: 7.7 MG/DL (ref 8.4–10.2)
CHLORIDE SERPL-SCNC: 101 MMOL/L (ref 96–112)
CO2 SERPL-SCNC: 30 MMOL/L (ref 20–33)
CREAT SERPL-MCNC: 0.64 MG/DL (ref 0.5–1.4)
ERYTHROCYTE [DISTWIDTH] IN BLOOD BY AUTOMATED COUNT: 38.4 FL (ref 35.9–50)
GFR SERPLBLD CREATININE-BSD FMLA CKD-EPI: 109 ML/MIN/1.73 M 2
GLUCOSE BLD STRIP.AUTO-MCNC: 135 MG/DL (ref 65–99)
GLUCOSE BLD STRIP.AUTO-MCNC: 176 MG/DL (ref 65–99)
GLUCOSE BLD STRIP.AUTO-MCNC: 237 MG/DL (ref 65–99)
GLUCOSE BLD STRIP.AUTO-MCNC: 281 MG/DL (ref 65–99)
GLUCOSE SERPL-MCNC: 133 MG/DL (ref 65–99)
HCT VFR BLD AUTO: 30.6 % (ref 42–52)
HGB BLD-MCNC: 10 G/DL (ref 14–18)
MCH RBC QN AUTO: 28 PG (ref 27–33)
MCHC RBC AUTO-ENTMCNC: 32.7 G/DL (ref 32.3–36.5)
MCV RBC AUTO: 85.7 FL (ref 81.4–97.8)
PLATELET # BLD AUTO: 239 K/UL (ref 164–446)
PMV BLD AUTO: 8.9 FL (ref 9–12.9)
POTASSIUM SERPL-SCNC: 4.1 MMOL/L (ref 3.6–5.5)
RBC # BLD AUTO: 3.57 M/UL (ref 4.7–6.1)
SODIUM SERPL-SCNC: 139 MMOL/L (ref 135–145)
WBC # BLD AUTO: 5.6 K/UL (ref 4.8–10.8)

## 2023-06-15 PROCEDURE — 94760 N-INVAS EAR/PLS OXIMETRY 1: CPT

## 2023-06-15 PROCEDURE — 82962 GLUCOSE BLOOD TEST: CPT | Mod: 91

## 2023-06-15 PROCEDURE — 90715 TDAP VACCINE 7 YRS/> IM: CPT | Performed by: INTERNAL MEDICINE

## 2023-06-15 PROCEDURE — A9270 NON-COVERED ITEM OR SERVICE: HCPCS | Performed by: HOSPITALIST

## 2023-06-15 PROCEDURE — 700105 HCHG RX REV CODE 258: Performed by: INTERNAL MEDICINE

## 2023-06-15 PROCEDURE — 99232 SBSQ HOSP IP/OBS MODERATE 35: CPT | Performed by: INTERNAL MEDICINE

## 2023-06-15 PROCEDURE — 700102 HCHG RX REV CODE 250 W/ 637 OVERRIDE(OP): Performed by: HOSPITALIST

## 2023-06-15 PROCEDURE — 770020 HCHG ROOM/CARE - TELE (206)

## 2023-06-15 PROCEDURE — 87389 HIV-1 AG W/HIV-1&-2 AB AG IA: CPT

## 2023-06-15 PROCEDURE — 700102 HCHG RX REV CODE 250 W/ 637 OVERRIDE(OP): Performed by: STUDENT IN AN ORGANIZED HEALTH CARE EDUCATION/TRAINING PROGRAM

## 2023-06-15 PROCEDURE — 80048 BASIC METABOLIC PNL TOTAL CA: CPT

## 2023-06-15 PROCEDURE — 99223 1ST HOSP IP/OBS HIGH 75: CPT | Performed by: INTERNAL MEDICINE

## 2023-06-15 PROCEDURE — 700111 HCHG RX REV CODE 636 W/ 250 OVERRIDE (IP): Performed by: INTERNAL MEDICINE

## 2023-06-15 PROCEDURE — A9270 NON-COVERED ITEM OR SERVICE: HCPCS | Performed by: STUDENT IN AN ORGANIZED HEALTH CARE EDUCATION/TRAINING PROGRAM

## 2023-06-15 PROCEDURE — 700111 HCHG RX REV CODE 636 W/ 250 OVERRIDE (IP): Performed by: HOSPITALIST

## 2023-06-15 PROCEDURE — 90471 IMMUNIZATION ADMIN: CPT

## 2023-06-15 PROCEDURE — 700101 HCHG RX REV CODE 250: Performed by: ORTHOPAEDIC SURGERY

## 2023-06-15 PROCEDURE — 80074 ACUTE HEPATITIS PANEL: CPT

## 2023-06-15 PROCEDURE — 85027 COMPLETE CBC AUTOMATED: CPT

## 2023-06-15 PROCEDURE — 3E0234Z INTRODUCTION OF SERUM, TOXOID AND VACCINE INTO MUSCLE, PERCUTANEOUS APPROACH: ICD-10-PCS | Performed by: INTERNAL MEDICINE

## 2023-06-15 RX ADMIN — OXYCODONE 5 MG: 5 TABLET ORAL at 18:19

## 2023-06-15 RX ADMIN — CEFAZOLIN 2 G: 2 INJECTION, POWDER, FOR SOLUTION INTRAMUSCULAR; INTRAVENOUS at 21:31

## 2023-06-15 RX ADMIN — DAKIN'S SOLUTION 0.125% (QUARTER STRENGTH) 473 ML: 0.12 SOLUTION at 06:07

## 2023-06-15 RX ADMIN — ENOXAPARIN SODIUM 40 MG: 100 INJECTION SUBCUTANEOUS at 16:51

## 2023-06-15 RX ADMIN — OXYCODONE 5 MG: 5 TABLET ORAL at 07:52

## 2023-06-15 RX ADMIN — ACETAMINOPHEN 650 MG: 325 TABLET ORAL at 19:39

## 2023-06-15 RX ADMIN — AMPICILLIN SODIUM AND SULBACTAM SODIUM 3 G: 2; 1 INJECTION, POWDER, FOR SOLUTION INTRAMUSCULAR; INTRAVENOUS at 11:40

## 2023-06-15 RX ADMIN — OXYCODONE 5 MG: 5 TABLET ORAL at 12:01

## 2023-06-15 RX ADMIN — CEFAZOLIN 2 G: 2 INJECTION, POWDER, FOR SOLUTION INTRAMUSCULAR; INTRAVENOUS at 14:08

## 2023-06-15 RX ADMIN — INSULIN GLARGINE-YFGN 10 UNITS: 100 INJECTION, SOLUTION SUBCUTANEOUS at 05:45

## 2023-06-15 RX ADMIN — SENNOSIDES AND DOCUSATE SODIUM 2 TABLET: 50; 8.6 TABLET ORAL at 16:51

## 2023-06-15 RX ADMIN — INSULIN GLARGINE-YFGN 10 UNITS: 100 INJECTION, SOLUTION SUBCUTANEOUS at 16:54

## 2023-06-15 RX ADMIN — INSULIN LISPRO 7 UNITS: 100 INJECTION, SOLUTION INTRAVENOUS; SUBCUTANEOUS at 16:55

## 2023-06-15 RX ADMIN — AMPICILLIN SODIUM AND SULBACTAM SODIUM 3 G: 2; 1 INJECTION, POWDER, FOR SOLUTION INTRAMUSCULAR; INTRAVENOUS at 05:40

## 2023-06-15 RX ADMIN — INSULIN LISPRO 4 UNITS: 100 INJECTION, SOLUTION INTRAVENOUS; SUBCUTANEOUS at 21:40

## 2023-06-15 RX ADMIN — DOXYCYCLINE 100 MG: 100 TABLET, FILM COATED ORAL at 05:33

## 2023-06-15 RX ADMIN — DAKIN'S SOLUTION 0.125% (QUARTER STRENGTH) 15 ML: 0.12 SOLUTION at 18:00

## 2023-06-15 RX ADMIN — INSULIN LISPRO 3 UNITS: 100 INJECTION, SOLUTION INTRAVENOUS; SUBCUTANEOUS at 11:41

## 2023-06-15 RX ADMIN — CLOSTRIDIUM TETANI TOXOID ANTIGEN (FORMALDEHYDE INACTIVATED), CORYNEBACTERIUM DIPHTHERIAE TOXOID ANTIGEN (FORMALDEHYDE INACTIVATED), BORDETELLA PERTUSSIS TOXOID ANTIGEN (GLUTARALDEHYDE INACTIVATED), BORDETELLA PERTUSSIS FILAMENTOUS HEMAGGLUTININ ANTIGEN (FORMALDEHYDE INACTIVATED), BORDETELLA PERTUSSIS PERTACTIN ANTIGEN, AND BORDETELLA PERTUSSIS FIMBRIAE 2/3 ANTIGEN 0.5 ML: 5; 2; 2.5; 5; 3; 5 INJECTION, SUSPENSION INTRAMUSCULAR at 15:48

## 2023-06-15 ASSESSMENT — ENCOUNTER SYMPTOMS
HEADACHES: 0
DIZZINESS: 0
WEAKNESS: 0
BLURRED VISION: 0
PHOTOPHOBIA: 0
CLAUDICATION: 0
HEARTBURN: 0
NERVOUS/ANXIOUS: 0
VOMITING: 0
CONSTIPATION: 0
MYALGIAS: 0
CHILLS: 0
DEPRESSION: 0
INSOMNIA: 0
DIARRHEA: 0
COUGH: 0
SPEECH CHANGE: 0
FEVER: 0
NAUSEA: 0
SHORTNESS OF BREATH: 0
SENSORY CHANGE: 0
ABDOMINAL PAIN: 0

## 2023-06-15 ASSESSMENT — PAIN DESCRIPTION - PAIN TYPE
TYPE: ACUTE PAIN

## 2023-06-15 ASSESSMENT — FIBROSIS 4 INDEX: FIB4 SCORE: 0.67

## 2023-06-15 NOTE — PROGRESS NOTES
Telemetry Shift Summary     Rhythm: SR  HR: 64-73  Ectopy: r PVC    Measurements: 0.18/0.10/0.38    Normal Values  Rhythm: SR  HR:   Measurements: 0.12-0.20/0.08-0.10/0.30-0.52

## 2023-06-15 NOTE — CARE PLAN
The patient is Stable - Low risk of patient condition declining or worsening    Shift Goals  Clinical Goals: pain mangement, wound care, discharge planning  Patient Goals: get out of here  Family Goals: vicente    Progress made toward(s) clinical / shift goals:  wound care, discharge planning    Patient is not progressing towards the following goals:      Problem: Pain - Standard  Goal: Alleviation of pain or a reduction in pain to the patient’s comfort goal  Outcome: Progressing  Note: Pain managed with medication per MAR     Problem: Knowledge Deficit - Standard  Goal: Patient and family/care givers will demonstrate understanding of plan of care, disease process/condition, diagnostic tests and medications  Outcome: Progressing  Note: Awaiting SNF bed     Problem: Fluid Volume  Goal: Fluid volume balance will be maintained  Outcome: Progressing     Problem: Respiratory  Goal: Patient will achieve/maintain optimum respiratory ventilation and gas exchange  Outcome: Progressing     Problem: Skin Integrity  Goal: Skin integrity is maintained or improved  Outcome: Progressing  Note: Dressing changes Q shift

## 2023-06-15 NOTE — CARE PLAN
The patient is Stable - Low risk of patient condition declining or worsening    Shift Goals  Clinical Goals: Pain control, wound care, abx  Patient Goals: pain management, sleep  Family Goals: vicente    Progress made toward(s) clinical / shift goals:      Problem: Pain - Standard  Goal: Alleviation of pain or a reduction in pain to the patient’s comfort goal  Outcome: Progressing  Flowsheets (Taken 6/15/2023 0046)  Pain Rating Scale (NPRS): 2  Note: Pt reported pain felt during dressing change. Pt reports 2/10 pain on right hand, but does not require medication to alleviate pain. Pt will be medicated accordingly when next dressing change takes place.       Problem: Fall Risk  Goal: Patient will remain free from falls  Outcome: Progressing  Note: Fall risk education given to pt. Pt understands education and follows commands to reduce the risk for falling.      Problem: Skin Integrity  Goal: Skin integrity is maintained or improved  Outcome: Progressing  Note: Bed bath given to pt. No jiménez care needed as jiménez was discontinued at 1800 on 6/14/2023. Linens changed and extra blankets given to pt.        Patient is not progressing towards the following goals:

## 2023-06-15 NOTE — PROGRESS NOTES
[facility] Medicine Daily Progress Note    Date of Service  6/15/2023    Chief Complaint  Christoph Taylor is a 58 y.o. male admitted 6/8/2023 with abdominal pain right finger pain and swelling    Hospital Course  Patient is a 58-year-old male with history of diabetes presented on 6 8 with abdominal pain and right finger pain and swelling.  He has been feeling poorly over the last week and worse over the last 2 days prior to admission.  Worsening abdominal pain in the epigastric region nausea without diarrhea.  Patient was initially admitted to the medical floor however was transferred to the ICU secondary to syncope and the development of DKA with an anion gap of 27.  He was successfully treated with insulin drip IV fluids blood glucoses improved and his gap is closed and was transferred out of the intensive care unit the next day.  He continued to have significant pain and erythema at the right thumb and MRI showed concern of osteomyelitis therefore orthopedic surgery was consulted and he was taken to the OR for debridement as well as bone biopsy to see if osteomyelitis is in fact present.  The pathology from the biopsy is currently pending, if it is present then we will discuss further with infectious disease if it is not then his course of antibiotics will be far less.    Interval Problem Update  6/13 patient is feeling better today as he is postop day 1 from I&D of the right thumb.  He did have biopsy taken in the operating room and this is currently pending pathology review to see if osteomyelitis is present.  If this is I will discuss further with infectious disease.  His abdominal pain has since resolved and his appetite has returned and he is feeling much better and I suspect that his symptoms were more related to a systemic reaction related to his infection and development of DKA then a fulminant infectious enteritis.  6/14 patient states that he feels constantly hungry and he still has significant right  thumb pain.  He is postop day 2 from debridement surgery.  Pathology from the biopsy taken during surgery unfortunately does confirm the presence of osteomyelitis.  Orthopedic surgery did not wish to take the remainder of the thumb due to high morbidity.  Given the confirmation of osteomyelitis I have requested infectious disease consultation.  Patient has had negative blood cultures and midline will be placed in anticipation of prolonged course of antibiotics for his osteomyelitis.  6/15 patient states he feels about the same, he is frustrated with the news of having osteomyelitis and the need for 6 weeks of antibiotics that I informed him of yesterday afternoon.  He has been accepted to skilled but pending insurance authorization.  PICC line has been placed and infectious diseases recommended Ancef for the duration of the 6 weeks.    I have discussed this patient's plan of care and discharge plan at IDT rounds today with Case Management, Nursing, Nursing leadership, and other members of the IDT team.    Consultants/Specialty  infectious disease and orthopedics    Code Status  Full Code    Disposition  The patient is medically cleared for discharge to home or a post-acute facility.  Anticipate discharge to: skilled nursing facility    I have placed the appropriate orders for post-discharge needs.    Review of Systems  Review of Systems   Constitutional:  Negative for chills and fever.   HENT:  Negative for congestion.    Eyes:  Negative for blurred vision and photophobia.   Respiratory:  Negative for cough and shortness of breath.    Cardiovascular:  Negative for chest pain, claudication and leg swelling.   Gastrointestinal:  Negative for abdominal pain, constipation, diarrhea, heartburn, nausea and vomiting.   Genitourinary:  Negative for dysuria and hematuria.   Musculoskeletal:  Negative for joint pain and myalgias.        Right thumb pain   Skin:  Negative for itching and rash.   Neurological:  Negative for  dizziness, sensory change, speech change, weakness and headaches.   Psychiatric/Behavioral:  Negative for depression. The patient is not nervous/anxious and does not have insomnia.         Physical Exam  Temp:  [36.6 °C (97.9 °F)-36.8 °C (98.3 °F)] 36.6 °C (97.9 °F)  Pulse:  [70-75] 72  Resp:  [18] 18  BP: (102-131)/(54-77) 124/77  SpO2:  [91 %-96 %] 91 %    Physical Exam  Vitals and nursing note reviewed.   Constitutional:       General: He is not in acute distress.     Appearance: Normal appearance.   HENT:      Head: Normocephalic and atraumatic.   Eyes:      General: No scleral icterus.     Extraocular Movements: Extraocular movements intact.   Cardiovascular:      Rate and Rhythm: Normal rate and regular rhythm.      Pulses: Normal pulses.      Heart sounds: Normal heart sounds. No murmur heard.  Pulmonary:      Effort: Pulmonary effort is normal. No respiratory distress.      Breath sounds: Normal breath sounds. No wheezing, rhonchi or rales.   Abdominal:      General: Abdomen is flat. Bowel sounds are normal. There is no distension.      Palpations: Abdomen is soft.      Tenderness: There is no right CVA tenderness or rebound.   Musculoskeletal:         General: No swelling or tenderness.      Cervical back: Normal range of motion and neck supple.   Lymphadenopathy:      Cervical: No cervical adenopathy.   Skin:     Coloration: Skin is not jaundiced.      Findings: No erythema.      Comments: Right thumb postop bandage in place   Neurological:      General: No focal deficit present.      Mental Status: He is alert and oriented to person, place, and time. Mental status is at baseline.      Cranial Nerves: No cranial nerve deficit.   Psychiatric:         Mood and Affect: Mood normal.         Behavior: Behavior normal.         Fluids    Intake/Output Summary (Last 24 hours) at 6/15/2023 1325  Last data filed at 6/15/2023 1030  Gross per 24 hour   Intake 240 ml   Output 1900 ml   Net -1660 ml          Laboratory  Recent Labs     06/13/23  0018 06/14/23  0345 06/15/23  0200   WBC 7.0 6.3 5.6   RBC 3.76* 3.74* 3.57*   HEMOGLOBIN 10.6* 10.5* 10.0*   HEMATOCRIT 32.2* 32.3* 30.6*   MCV 85.6 86.4 85.7   MCH 28.2 28.1 28.0   MCHC 32.9 32.5 32.7   RDW 39.0 39.3 38.4   PLATELETCT 173 190 239   MPV 8.6* 8.8* 8.9*       Recent Labs     06/13/23  0018 06/14/23  0345 06/15/23  0200   SODIUM 135 138 139   POTASSIUM 3.7 4.1 4.1   CHLORIDE 96 102 101   CO2 29 31 30   GLUCOSE 169* 119* 133*   BUN 7* 10 13   CREATININE 0.45* 0.49* 0.64   CALCIUM 7.7* 8.0* 7.7*                     Imaging  IR-PICC LINE PLACEMENT W/ GUIDANCE > AGE 5   Final Result                  Ultrasound-guided PICC placement performed by qualified nursing staff as    above.          MR-HAND - WITH & W/O RIGHT   Final Result      1.  Marrow edema, enhancement, and erosive change of the first distal phalanx with likely intramedullary gas. This most likely represents presence of osteomyelitis.      2.  Mild marrow edema and enhancement of the first proximal phalanx which may be reactive in nature versus representative of early osteomyelitis.      3.  Cellulitis involves the thumb distally to the greatest degree. This also extends proximally into the thenar area and extends along the dorsal and volar aspects of the hand.      4.  Multifocal osteoarthritis.      CT-HAND WITH RIGHT   Final Result      1.  Erosive change of the tuft of the first distal phalanx with osteopenia involving the remainder the phalanx with some intramedullary gas. This likely represents presence of osteomyelitis.      2.  Cellulitis involving the thumb most prominently distally. No focal fluid collection to suggest abscess.      3.  Cellulitis involving the remainder of the hand and wrist.      EC-ECHOCARDIOGRAM COMPLETE W/O CONT   Final Result      DX-CHEST-PORTABLE (1 VIEW)   Final Result         1.  Left basilar atelectasis, no focal infiltrate      CT-ABDOMEN-PELVIS WITH   Final  Result      1.  Horseshoe kidney with mild hydronephrosis, possibly related to bladder distention. No obstructing stone seen.   2.  Small, nonobstructing renal stones.   3.  Features suggestive of enteritis. No evidence of obstruction.   4.  Patchy consolidations of the left lower lobe, likely infectious or inflammatory.             Assessment/Plan  * Osteomyelitis of right hand (HCC)- (present on admission)  Assessment & Plan  Patient is status post I&D of the right thumb on 612  Pathology report resulted on 6/14, osteomyelitis is present on biopsy  MSSA  Appreciate ID recommendations, 6 weeks Ancef  PICC has been placed    Elevated troponin  Assessment & Plan  Troponin was slightly elevated and trending down, 29> 24,   denies chest pain,   EKG I personally reviewed,  no acute ischemic ST-T changes.      Likely demand ischemia in the setting of DKA    Enteritis  Assessment & Plan  Tolerating diet without issue  Abdominal pain has resolved    Acute metabolic encephalopathy  Assessment & Plan  Resolved    Syncope  Assessment & Plan  Likely related to DKA and hypovolemia  Symptoms have since resolved    AP (abdominal pain)- (present on admission)  Assessment & Plan  Resolved with DKA treatment    Dyslipidemia- (present on admission)  Assessment & Plan  Cardiac diet when able to tolerate orally    Uncontrolled type 2 diabetes mellitus with hyperglycemia (HCC)- (present on admission)  Assessment & Plan  With marked hyperglycemia  Last glycated hemoglobin was 15.2% indicating a very poor control  I will start Premeal, long & short acting insulin for now  Accu-Checks, hypoglycemia protocol  Adjust according to blood sugars trend     6/10 continue Lantus at 10 units twice daily, SSI      Metabolic acidosis, increased anion gap- (present on admission)  Assessment & Plan  Likely multifactorial due to reduced intravascular volume/hyperglycemia/dehydration, infection/colitis  Anticipate improvement with intravenous fluids and  initiating insulin, continue to monitor    Diabetic ketoacidosis without coma (HCC)  Assessment & Plan  Resolved, out of ICU  Likely due to underlying infection  Continue insulin and close monitor         VTE prophylaxis: SCDs/TEDs    I have performed a physical exam and reviewed and updated ROS and Plan today (6/15/2023). In review of yesterday's note (6/14/2023), there are no changes except as documented above.

## 2023-06-15 NOTE — DISCHARGE PLANNING
Case Management Discharge Planning    Admission Date: 6/8/2023  GMLOS: 7.7  ALOS: 7    6-Clicks ADL Score: 22  6-Clicks Mobility Score: 18    Anticipated Discharge Dispo: Discharge Disposition: D/T to SNF with Medicare cert in anticipation of skilled care (03)    DME Needed: No    Action(s) Taken: Updated Provider/Nurse on Discharge Plan  MYRON attended morning IDT rounds with team. Patient is medically cleared for discharge at this time. MYRON informed Addy with [facility] patient is medically cleared for discharge. Addy started auth today. Pending auth for SNF     Escalations Completed: None    Medically Clear: Yes    Next Steps: SNF insurance authorization     Barriers to Discharge: Medical clearance and Pending Placement    Is the patient up for discharge tomorrow: No

## 2023-06-15 NOTE — CONSULTS
INFECTIOUS DISEASES INPATIENT CONSULT NOTE     Date of Service:6/15/2023    Consult Requested By: Mandie Soriano D.O.    Reason for Consultation: Right thumb infection, right thumb osteomyelitis      History of Present Illness:   Christoph Taylor is a 58 y.o. diabetic male admitted 6/8/2023 with abd pain and DKA  Noted also to have right thumb pain, erythema, and swelling.  Patient reported not feeling well over the past 1 week and got much worse over the past 2 days.  Progressively worsening abdominal pain. Pain is rated as severe located mostly in the epigastric region.  He+ nausea but no diarrhea.  Pain is worse after eating.  He denies noticing any blood or mucus in his stool. Initially BP 70/30 improved with fluids  No fever +hypoxemia requiring 3L O2 No fever WBC 16.1 GlycoHgb 15.2  Admitted to ICU for treatment DKA. Abd pain resolved  Ortho consulted-amputation deferred due to morbidity.  I and D done Culture +MSSA and biopsy +bone infection  Records reviewed in Care Everywhere-no recent tetanus Prior trauma  Currently on Unasyn and doxy  Infectious Diseases consulted for antibiotic recommendation and management    Review Of Systems:  Somnolent limited     PMH:   Past Medical History:   Diagnosis Date    Abnormal electrocardiogram (ECG) (EKG) 11/8/2021    KRISTAL (acute kidney injury) ([facility]) 11/8/2021    Chest pain in adult 11/8/2021    CKD (chronic kidney disease) stage 3, GFR 30-59 ml/min ([facility]) 11/7/2021    Diabetes (HCC)     Diabetic ketoacidosis without coma associated with type 2 diabetes mellitus ([facility]) 11/7/2021    Diarrhea 3/18/2022    DKA, type 1, not at goal ([facility]) 7/28/2022    Esophagitis 7/28/2022    [place] (hard of hearing)     Very [place] No hearing aides    Hypercholesteremia          PSH:  Past Surgical History:   Procedure Laterality Date    FINGER OR HAND INCISION AND DRAINAGE Right 6/12/2023    Procedure: INCISION AND DRAINAGE, HAND - THUMB;  Surgeon: Ace Shaw M.D.;  Location: SURGERY [facility];  Service: Orthopedics    MS UPPER GI ENDOSCOPY,DIAGNOSIS N/A 3/14/2023    Procedure: GASTROSCOPY;  Surgeon: Atilio Freed M.D.;  Location: SURGERY [facility];  Service: Gastroenterology    OTHER      appy, lumbar fusion       FAMILY HX:  Family History   Problem Relation Age of Onset    Heart Disease Father        SOCIAL HX:  Social History     Socioeconomic History    Marital status: Single     Spouse name: Not on file    Number of children: Not on file    Years of education: Not on file    Highest education level: Not on file   Occupational History    Not on file   Tobacco Use    Smoking status: Former    Smokeless tobacco: Never   Vaping Use    Vaping Use: Not on file   Substance and Sexual Activity    Alcohol use: Not Currently    Drug use: Not Currently    Sexual activity: Not on file   Other Topics Concern    Not on file   Social History Narrative    Not on file     Social Determinants of Health     Financial Resource Strain: Not on file   Food Insecurity: Not on file   Transportation Needs: Not on file   Physical Activity: Not on file   Stress: Not on file   Social Connections: Not on file   Intimate Partner Violence: Not on file   Housing Stability: Not on file     Social History     Tobacco Use   Smoking Status Former   Smokeless Tobacco Never   Vaping Use    Vaping Use: Not on file     Social History     Substance and Sexual Activity   Alcohol Use Not Currently       Allergies/Intolerances:  No Known Allergies      Other Current Medications:    Current Facility-Administered Medications:     dakins 0.125% (1/4 strength) topical soln, , Topical, BID, Ace Shaw M.D., 473 mL at 06/15/23 0607    ampicillin/sulbactam (UNASYN) 3 g in  mL IVPB, 3 g, Intravenous, Q6HRS, Mandie Soriano D.O., Last Rate: 200 mL/hr at 06/15/23 1140, 3 g at 06/15/23 1140    doxycycline monohydrate (ADOXA) tablet 100 mg, 100 mg, Oral, Q12HRS, Lisa Sepulveda M.D., 100 mg at 06/15/23 0533    oxyCODONE immediate-release  (ROXICODONE) tablet 5 mg, 5 mg, Oral, Q4HRS PRN, Lisa Sepulveda M.D., 5 mg at 06/15/23 1201    oxyCODONE immediate release (ROXICODONE) tablet 10 mg, 10 mg, Oral, Q4HRS PRN, Lisa Sepulveda M.D., 10 mg at 06/13/23 1954    ondansetron (ZOFRAN) syringe/vial injection 4 mg, 4 mg, Intravenous, Q4HRS PRN, Rama Shea M.D., 4 mg at 06/10/23 0829    insulin GLARGINE (Lantus,Semglee) injection, 10 Units, Subcutaneous, BID, 10 Units at 06/15/23 0545 **AND** insulin lispro (HumaLOG,AdmeLOG) injection, 3-14 Units, Subcutaneous, 4X/DAY ACHS, 3 Units at 06/15/23 1141 **AND** POC blood glucose manual result, , , Q AC AND BEDTIME(S) **AND** NOTIFY MD and PharmD, , , Once **AND** Administer 20 grams of glucose (approximately 8 ounces of fruit juice) every 15 minutes PRN FSBG less than 70 mg/dL, , , PRN **AND** dextrose 10 % BOLUS 25 g, 25 g, Intravenous, Q15 MIN PRN, Cynthia Mclain M.D.    acetaminophen (Tylenol) tablet 650 mg, 650 mg, Oral, Q6HRS PRN, Bianca Lauren M.D., 650 mg at 06/12/23 2256    senna-docusate (PERICOLACE or SENOKOT S) 8.6-50 MG per tablet 2 Tablet, 2 Tablet, Oral, BID, 2 Tablet at 06/13/23 1803 **AND** polyethylene glycol/lytes (MIRALAX) PACKET 1 Packet, 1 Packet, Oral, QDAY PRN **AND** magnesium hydroxide (MILK OF MAGNESIA) suspension 30 mL, 30 mL, Oral, QDAY PRN **AND** bisacodyl (DULCOLAX) suppository 10 mg, 10 mg, Rectal, QDAY PRN, Bianca Lauren M.D.    enoxaparin (Lovenox) inj 40 mg, 40 mg, Subcutaneous, DAILY AT 1800, Aseester Lauren M.D., 40 mg at 06/14/23 1729    Notify provider if pain remains uncontrolled, , , CONTINUOUS **AND** Use the Numeric Rating Scale (NRS), Aguilar-Baker Faces (WBF), or FLACC on regular floors and Critical-Care Pain Observation Tool (CPOT) on ICUs/Trauma to assess pain, , , CONTINUOUS **AND** Pulse Ox, , , CONTINUOUS **AND** Pharmacy Consult Request ...Pain Management Review 1 Each, 1 Each, Other, PHARMACY TO DOSE **AND** If patient difficult to arouse and/or has  respiratory depression (respiratory rate of 10 or less), stop any opiates that are currently infusing and call a Rapid Response., , , CONTINUOUS, Bianca Lauren M.D.      Most Recent Vital Signs:  /74   Pulse 75   Temp 36.8 °C (98.2 °F) (Oral)   Resp 18   Ht 1.829 m (6')   Wt 83.2 kg (183 lb 6.8 oz)   SpO2 95%   BMI 24.88 kg/m²   Temp  Av.7 °C (98 °F)  Min: 36.3 °C (97.3 °F)  Max: 37.1 °C (98.7 °F)    Physical Exam:  General: disheveled no acute distress  Neck: no JVD  Chest: unlabored.  Cardiac: RRR  Abdomen:  non-distended  Extremities: right hand dressed  Skin: no rashes   Neuro: somnolent  Psych: unable to assess    Pertinent Lab Results:  Recent Labs     23  0018 23  0345 06/15/23  0200   WBC 7.0 6.3 5.6      Recent Labs     23  0018 23  0345 06/15/23  0200   HEMOGLOBIN 10.6* 10.5* 10.0*   HEMATOCRIT 32.2* 32.3* 30.6*   MCV 85.6 86.4 85.7   MCH 28.2 28.1 28.0   PLATELETCT 173 190 239         Recent Labs     23  0018 23  0345 06/15/23  0200   SODIUM 135 138 139   POTASSIUM 3.7 4.1 4.1   CHLORIDE 96 102 101   CO2 29 31 30   CREATININE 0.45* 0.49* 0.64        No results for input(s): ALBUMIN in the last 72 hours.    Invalid input(s): AST, ALT, ALKPHOS, BILITOT, TOTALBILIRUB, BILIRUBINTOT, BILIRUBINDIR, BILIRUBININD, ALKALINEPHOS     Pertinent Micro:  Results       Procedure Component Value Units Date/Time    Anaerobic Culture [[id number]] Collected: 23    Order Status: Completed Specimen: Wound Updated: 06/15/23 1011     Significant Indicator NEG     Source WND     Site Right THUMB     Culture Result Culture in progress.    Narrative:      A Finger tissue  riught thumb tissue  Surgery - swabs received    CULTURE WOUND W/ GRAM STAIN [[id number]]  (Abnormal)  (Susceptibility) Collected: 23 [id number]    Order Status: Completed Specimen: Wound Updated: 06/15/23 1011     Significant Indicator POS     Source WND     Site Right THUMB     Culture Result -  "    Gram Stain Result No organisms seen.     Culture Result Staphylococcus aureus  Moderate growth  Methicillin sensitive by screening method      Narrative:      A Finger tissue  riught thumb tissue  Surgery - swabs received    Susceptibility       Staphylococcus aureus (1)       Antibiotic Interpretation Microscan   Method Status    Azithromycin Resistant >4 mcg/mL JORDON Preliminary    Clindamycin Sensitive 0.5 mcg/mL JORDON Preliminary    Cefazolin Sensitive <=8 mcg/mL JORDON Preliminary    Cefepime Sensitive <=4 mcg/mL JORDON Preliminary    Ceftaroline Sensitive <=0.5 mcg/mL JORDON Preliminary    Daptomycin Sensitive 1 mcg/mL JORDON Preliminary    Erythromycin Resistant >4 mcg/mL JORDON Preliminary    Ampicillin/sulbactam Sensitive <=8/4 mcg/mL JORDON Preliminary    Vancomycin Sensitive 1 mcg/mL JORDON Preliminary    Oxacillin Sensitive <=0.25 mcg/mL JORDON Preliminary    Pip/Tazobactam Sensitive <=8 mcg/mL JORDON Preliminary    Trimeth/Sulfa Sensitive <=0.5/9.5 mcg/mL JORDON Preliminary    Tetracycline Sensitive <=4 mcg/mL JORDON Preliminary                       BLOOD CULTURE [[id number]] Collected: 06/09/23 1020    Order Status: Completed Specimen: Blood from Peripheral Updated: 06/14/23 1217     Significant Indicator NEG     Source BLD     Site PERIPHERAL     Culture Result No growth after 5 days of incubation.  Blood culture testing and Gram stain, if indicated, are  performed at [facility], [address] [place] [address].  Positive blood cultures are  sent to [facility], [address] [place] [address], for organism identification and  susceptibility testing.      Narrative:      Per Hospital Policy: Only change Specimen Src: to \"Line\" if  specified by physician order.  No site indicated    BLOOD CULTURE [[id number]] Collected: 06/09/23 1000    Order Status: Completed Specimen: Blood from Peripheral Updated: 06/14/23 1217     Significant Indicator NEG     Source BLD     Site PERIPHERAL     " "Culture Result No growth after 5 days of incubation.  Blood culture testing and Gram stain, if indicated, are  performed at [facility], [address] [place] [address].  Positive blood cultures are  sent to [facility], [address] [place] [address], for organism identification and  susceptibility testing.      Narrative:      Per Hospital Policy: Only change Specimen Src: to \"Line\" if  specified by physician order.  Left AC    GRAM STAIN [[id number]] Collected: 06/12/23 1814    Order Status: Completed Specimen: Wound Updated: 06/13/23 1238     Significant Indicator .     Source WND     Site Right THUMB     Gram Stain Result No organisms seen.    Narrative:      A Finger tissue  riught thumb tissue  Surgery - swabs received    MRSA By PCR (Amp) [[id number]] Collected: 06/11/23 2030    Order Status: Completed Specimen: Respirate from Nares Updated: 06/12/23 1125     MRSA by PCR Negative    Narrative:      Collected By: [id number] EL EDUARDO    S. Aureus By PCR, Nasal Complete [[id number]]     Order Status: Canceled Specimen: Respirate     URINALYSIS [[id number]]  (Abnormal) Collected: 06/10/23 2001    Order Status: Completed Specimen: Urine, Clean Catch Updated: 06/10/23 2033     Color Yellow     Character Clear     Specific Gravity 1.020     Ph 5.5     Glucose 250 mg/dL      Ketones 40 mg/dL      Protein Negative mg/dL      Bilirubin Negative     Nitrite Negative     Leukocyte Esterase Negative     Occult Blood Negative     Micro Urine Req see below     Comment: Microscopic examination not performed when specimen is clear  and chemically negative for protein, blood, leukocyte esterase  and nitrite.         Narrative:      Collected By: [id number] IVÁN RAMOS I          No results found for: BLOODCULTU, BLDCULT, BCHOLD     Studies:  IMPRESSION:     1.  Erosive change of the tuft of the first distal phalanx with osteopenia involving the remainder the phalanx with some " intramedullary gas. This likely represents presence of osteomyelitis.     2.  Cellulitis involving the thumb most prominently distally. No focal fluid collection to suggest abscess.     3.  Cellulitis involving the remainder of the hand and wrist.    IMPRESSION:     1.  Marrow edema, enhancement, and erosive change of the first distal phalanx with likely intramedullary gas. This most likely represents presence of osteomyelitis.     2.  Mild marrow edema and enhancement of the first proximal phalanx which may be reactive in nature versus representative of early osteomyelitis.     3.  Cellulitis involves the thumb distally to the greatest degree. This also extends proximally into the thenar area and extends along the dorsal and volar aspects of the hand.     4.  Multifocal osteoarthritis.    IMPRESSION:   Acute osteomyelitis right thumb, MSSA  -sp Right thumb irrigation and excisional debridement down to bone of left thumb, wound size 1cm x 1cm  Right thumb bone biopsy +acute OM  Uncontrolled DM-GlycoHgb 15.2  DOA +amphetamines, opiates, oxycodone      PLAN:   Recommend 6 weeks IV Ancef to try to salvage digit  Prognosis poor-high risk for future amputation  PICC  Keep BS under 150 to help control current infection  Tetanus updated as no record of prior tetanus shot  Check HIV and hep panel  Plan for SNF  Stop date antibiotics 7/25/2023    Plan of care discussed with JENNIFER Soriano D.O.. Will continue to follow    Mandi Dave M.D.

## 2023-06-15 NOTE — PROGRESS NOTES
Received report from Kate CASPER, at pt bedside. POC discussed. Bed bath performed and linen changed. Pt did not report any pain felt at this time. Call light and belongings within reach. Bed locked and in low position. Alarm on and fall precautions in place.

## 2023-06-16 ENCOUNTER — PATIENT OUTREACH (OUTPATIENT)
Dept: SCHEDULING | Facility: IMAGING CENTER | Age: 58
End: 2023-06-16
Payer: COMMERCIAL

## 2023-06-16 VITALS
HEART RATE: 73 BPM | WEIGHT: 183.42 LBS | TEMPERATURE: 98 F | HEIGHT: 72 IN | SYSTOLIC BLOOD PRESSURE: 118 MMHG | DIASTOLIC BLOOD PRESSURE: 66 MMHG | OXYGEN SATURATION: 90 % | RESPIRATION RATE: 16 BRPM | BODY MASS INDEX: 24.84 KG/M2

## 2023-06-16 LAB
BACTERIA SPEC ANAEROBE CULT: NORMAL
BACTERIA WND AEROBE CULT: ABNORMAL
GLUCOSE BLD STRIP.AUTO-MCNC: 201 MG/DL (ref 65–99)
GLUCOSE BLD STRIP.AUTO-MCNC: 237 MG/DL (ref 65–99)
GRAM STN SPEC: ABNORMAL
HAV IGM SERPL QL IA: NORMAL
HBV CORE IGM SER QL: NORMAL
HBV SURFACE AG SER QL: NORMAL
HCV AB SER QL: NORMAL
HIV 1+2 AB+HIV1 P24 AG SERPL QL IA: NORMAL
SIGNIFICANT IND 70042: ABNORMAL
SIGNIFICANT IND 70042: NORMAL
SITE SITE: ABNORMAL
SITE SITE: NORMAL
SOURCE SOURCE: ABNORMAL
SOURCE SOURCE: NORMAL

## 2023-06-16 PROCEDURE — 700105 HCHG RX REV CODE 258: Performed by: INTERNAL MEDICINE

## 2023-06-16 PROCEDURE — 99233 SBSQ HOSP IP/OBS HIGH 50: CPT | Performed by: INTERNAL MEDICINE

## 2023-06-16 PROCEDURE — 97116 GAIT TRAINING THERAPY: CPT

## 2023-06-16 PROCEDURE — A9270 NON-COVERED ITEM OR SERVICE: HCPCS | Performed by: STUDENT IN AN ORGANIZED HEALTH CARE EDUCATION/TRAINING PROGRAM

## 2023-06-16 PROCEDURE — 700102 HCHG RX REV CODE 250 W/ 637 OVERRIDE(OP): Performed by: STUDENT IN AN ORGANIZED HEALTH CARE EDUCATION/TRAINING PROGRAM

## 2023-06-16 PROCEDURE — 99239 HOSP IP/OBS DSCHRG MGMT >30: CPT | Performed by: INTERNAL MEDICINE

## 2023-06-16 PROCEDURE — 82962 GLUCOSE BLOOD TEST: CPT

## 2023-06-16 PROCEDURE — A9270 NON-COVERED ITEM OR SERVICE: HCPCS | Performed by: HOSPITALIST

## 2023-06-16 PROCEDURE — 94760 N-INVAS EAR/PLS OXIMETRY 1: CPT

## 2023-06-16 PROCEDURE — 97530 THERAPEUTIC ACTIVITIES: CPT

## 2023-06-16 PROCEDURE — 700111 HCHG RX REV CODE 636 W/ 250 OVERRIDE (IP): Performed by: INTERNAL MEDICINE

## 2023-06-16 PROCEDURE — 700102 HCHG RX REV CODE 250 W/ 637 OVERRIDE(OP): Performed by: HOSPITALIST

## 2023-06-16 RX ORDER — OXYCODONE HYDROCHLORIDE 5 MG/1
5 TABLET ORAL EVERY 4 HOURS PRN
Qty: 10 TABLET | Refills: 0 | Status: SHIPPED | OUTPATIENT
Start: 2023-06-16 | End: 2023-06-18

## 2023-06-16 RX ORDER — INSULIN LISPRO 100 [IU]/ML
3-14 INJECTION, SOLUTION INTRAVENOUS; SUBCUTANEOUS
Status: DISCONTINUED | OUTPATIENT
Start: 2023-06-16 | End: 2023-06-16 | Stop reason: HOSPADM

## 2023-06-16 RX ORDER — INSULIN LISPRO 100 [IU]/ML
3-14 INJECTION, SOLUTION INTRAVENOUS; SUBCUTANEOUS
Status: SHIPPED
Start: 2023-06-16 | End: 2023-08-07 | Stop reason: SDUPTHER

## 2023-06-16 RX ADMIN — SENNOSIDES AND DOCUSATE SODIUM 2 TABLET: 50; 8.6 TABLET ORAL at 05:51

## 2023-06-16 RX ADMIN — INSULIN LISPRO 4 UNITS: 100 INJECTION, SOLUTION INTRAVENOUS; SUBCUTANEOUS at 11:02

## 2023-06-16 RX ADMIN — INSULIN LISPRO 4 UNITS: 100 INJECTION, SOLUTION INTRAVENOUS; SUBCUTANEOUS at 05:59

## 2023-06-16 RX ADMIN — CEFAZOLIN 2 G: 2 INJECTION, POWDER, FOR SOLUTION INTRAMUSCULAR; INTRAVENOUS at 14:08

## 2023-06-16 RX ADMIN — OXYCODONE 5 MG: 5 TABLET ORAL at 06:07

## 2023-06-16 RX ADMIN — INSULIN GLARGINE-YFGN 10 UNITS: 100 INJECTION, SOLUTION SUBCUTANEOUS at 06:00

## 2023-06-16 RX ADMIN — CEFAZOLIN 2 G: 2 INJECTION, POWDER, FOR SOLUTION INTRAMUSCULAR; INTRAVENOUS at 05:50

## 2023-06-16 ASSESSMENT — ENCOUNTER SYMPTOMS
FEVER: 0
SHORTNESS OF BREATH: 0

## 2023-06-16 ASSESSMENT — COGNITIVE AND FUNCTIONAL STATUS - GENERAL
SUGGESTED CMS G CODE MODIFIER MOBILITY: CJ
CLIMB 3 TO 5 STEPS WITH RAILING: A LITTLE
WALKING IN HOSPITAL ROOM: A LITTLE
MOBILITY SCORE: 21
STANDING UP FROM CHAIR USING ARMS: A LITTLE

## 2023-06-16 ASSESSMENT — GAIT ASSESSMENTS
ASSISTIVE DEVICE: FRONT WHEEL WALKER
GAIT LEVEL OF ASSIST: SUPERVISED
DISTANCE (FEET): 30

## 2023-06-16 ASSESSMENT — PAIN DESCRIPTION - PAIN TYPE
TYPE: ACUTE PAIN
TYPE: ACUTE PAIN

## 2023-06-16 ASSESSMENT — FIBROSIS 4 INDEX: FIB4 SCORE: 0.67

## 2023-06-16 NOTE — CARE PLAN
The patient is Stable - Low risk of patient condition declining or worsening    Shift Goals  Clinical Goals: Abx, wound care,  Patient Goals: pain management, discharge, sleep  Family Goals: vicente    Progress made toward(s) clinical / shift goals:    Problem: Pain - Standard  Goal: Alleviation of pain or a reduction in pain to the patient’s comfort goal  Outcome: Progressing  Note: Pt needs pain management interventions to tolerate procedures and activities due to past events. Pt pain remedied with pharmacological interventions. Pt reported 3/10 throbbing pain in right hand, medicated accordingly.      Problem: Physical Regulation  Goal: Signs and symptoms of infection will decrease  Outcome: Progressing  Note: Pt would like to ambulate, but is unable to due to lower extremity pain from past event. Pt able to reposition self with no assistance and go to the bathroom with weight bearing device (FWW)     Problem: Fall Risk  Goal: Patient will remain free from falls  Outcome: Progressing  Note: Pt utilizes call light button to request needs appropriately. Pt understands fall risk precautions in place.        Patient is not progressing towards the following goals:

## 2023-06-16 NOTE — PROGRESS NOTES
Telemetry Shift Summary     Rhythm: SR  Rate: 70-78  Measurements: .15/.08/.36  Ectopy (reported by Monitor Tech): r PVC     Normal Values  Rhythm: Sinus  HR:   Measurements: 0.12-0.20/0.06-0.10/0.30-0.52

## 2023-06-16 NOTE — THERAPY
Physical Therapy   Daily Treatment     Patient Name: Christoph Taylor  Age:  58 y.o., Sex:  male  Medical Record #: [id number]  Today's Date: 6/16/2023          Assessment    Pt is doing much better does not have shoe to put over AFL on L foot.scheduled for SNF today    Plan    Treatment Plan Status:    Type of Treatment: Gait Training, Neuro Re-Education / Balance, Stair Training, Therapeutic Activities, Therapeutic Exercise  Treatment Frequency: 5 Times per Week  Treatment Duration: Until Therapy Goals Met    DC Equipment Recommendations: Unable to determine at this time  Discharge Recommendations: (P) Recommend post-acute placement for additional physical therapy services prior to discharge home     Objective       06/16/23 0900   Charge Group   PT Gait Training (Units) 1   PT Therapeutic Activities (Units) 1   Balance   Sitting Balance (Static) Good   Sitting Balance (Dynamic) Good   Standing Balance (Static) Fair +   Standing Balance (Dynamic) Fair +   Weight Shift Sitting Good   Weight Shift Standing Good   Bed Mobility    Supine to Sit Modified Independent   Sit to Supine Modified Independent   Scooting Modified Independent   Gait Analysis   Gait Level Of Assist Supervised   Assistive Device Front Wheel Walker   Distance (Feet) 30   # of Times Distance was Traveled 1   Weight Bearing Status wbat   Functional Mobility   Sit to Stand Modified Independent   Bed, Chair, Wheelchair Transfer Modified Independent   Toilet Transfers Modified Independent   Transfer Method Stand Step   How much difficulty does the patient currently have...   Turning over in bed (including adjusting bedclothes, sheets and blankets)? 4   Sitting down on and standing up from a chair with arms (e.g., wheelchair, bedside commode, etc.) 4   Moving from lying on back to sitting on the side of the bed? 4   How much help from another person does the patient currently need...   Moving to and from a bed to a chair (including a wheelchair)? 3    Need to walk in a hospital room? 3   Climbing 3-5 steps with a railing? 3   6 clicks Mobility Score 21   Activity Tolerance   Sitting Edge of Bed 10   Standing 5   Short Term Goals    Short Term Goal # 1 Pt will be able to perform sit <> stand and transfer Charity in 6 visits.   Goal Outcome # 1 Progressing as expected   Short Term Goal # 2 Pt will be able to ambulate 100 ft with FWW Charity in 6 visits.   Goal Outcome # 2 Progressing as expected   Short Term Goal # 3 Pt will be able to go up/down 5 steps with rail [place] in 6 visits.   Goal Outcome # 3 Goal not met   Anticipated Discharge Equipment and Recommendations   Discharge Recommendations Recommend post-acute placement for additional physical therapy services prior to discharge home   Interdisciplinary Plan of Care Collaboration   IDT Collaboration with  Nursing   Session Information   Date / Session Number  6/16-2 2/5 6/18

## 2023-06-16 NOTE — PROGRESS NOTES
Telemetry Shift Summary    Rhythm SR  HR Range 75-82  Ectopy rPVC, rPAC  Measurements 0.18/0.10/0.36        Normal Values  Rhythm SR  HR Range    Measurements 0.12-0.20 / 0.06-0.10  / 0.30-0.52

## 2023-06-16 NOTE — PROGRESS NOTES
Infectious Disease Progress Note    Author: Mandi Dave M.D. Date & Time of service: 2023  12:06 PM    Chief Complaint:   Right thumb infection, right thumb osteomyelitis       Interval History:  58 y.o. diabetic male admitted 2023 with abd pain and DKA -found to have above   AF WBC 5.6 somnolent again today. No acute events  Labs Reviewed, Medications Reviewed, and Wound Reviewed.    Review of Systems:  Review of Systems   Unable to perform ROS: Other   Constitutional:  Negative for fever.   Respiratory:  Negative for shortness of breath.        Hemodynamics:  Temp (24hrs), Av.7 °C (98.1 °F), Min:36.7 °C (98 °F), Max:36.8 °C (98.3 °F)  Temperature: 36.7 °C (98 °F)  Pulse  Av  Min: 64  Max: 102   Blood Pressure: 118/66       Physical Exam:  Physical Exam  Constitutional:       General: He is not in acute distress.     Appearance: He is not ill-appearing, toxic-appearing or diaphoretic.   Cardiovascular:      Rate and Rhythm: Normal rate.   Pulmonary:      Effort: Pulmonary effort is normal. No respiratory distress.   Musculoskeletal:      Comments: Thumb dressed   Skin:     Findings: No rash.         Meds:    Current Facility-Administered Medications:     insulin GLARGINE **AND** insulin lispro **AND** POC blood glucose manual result **AND** NOTIFY MD and PharmD **AND** Administer 20 grams of glucose (approximately 8 ounces of fruit juice) every 15 minutes PRN FSBG less than 70 mg/dL **AND** dextrose bolus    ceFAZolin    dakins 0.125% (1/4 strength)    oxyCODONE immediate-release    oxyCODONE immediate-release    ondansetron    acetaminophen    senna-docusate **AND** polyethylene glycol/lytes **AND** magnesium hydroxide **AND** bisacodyl    enoxaparin (LOVENOX) injection    Notify provider if pain remains uncontrolled **AND** Use the Numeric Rating Scale (NRS), Aguilar-Baker Faces (WBF), or FLACC on regular floors and Critical-Care Pain Observation Tool (CPOT) on ICUs/Trauma to assess  pain **AND** Pulse Ox **AND** Pharmacy Consult Request **AND** If patient difficult to arouse and/or has respiratory depression (respiratory rate of 10 or less), stop any opiates that are currently infusing and call a Rapid Response.    Labs:  Recent Labs     06/14/23  0345 06/15/23  0200   WBC 6.3 5.6   RBC 3.74* 3.57*   HEMOGLOBIN 10.5* 10.0*   HEMATOCRIT 32.3* 30.6*   MCV 86.4 85.7   MCH 28.1 28.0   RDW 39.3 38.4   PLATELETCT 190 239   MPV 8.8* 8.9*     Recent Labs     06/14/23  0345 06/15/23  0200   SODIUM 138 139   POTASSIUM 4.1 4.1   CHLORIDE 102 101   CO2 31 30   GLUCOSE 119* 133*   BUN 10 13     Recent Labs     06/14/23  0345 06/15/23  0200   CREATININE 0.49* 0.64       Imaging:  IR-PICC LINE PLACEMENT W/ GUIDANCE > AGE 5    Result Date: 6/14/2023  HISTORY/REASON FOR EXAM:   PICC placement. TECHNIQUE/EXAM DESCRIPTION AND NUMBER OF VIEWS:   PICC line insertion with ultrasound guidance.  The procedure was performed using maximal sterile barrier technique including sterile gown, mask, cap, and donning of sterile gloves following appropriate hand hygiene and/or sterile scrub. Patient skin site was prepped with 2% Chlorhexidine solution. FINDINGS:  PICC line insertion with Ultrasound Guidance was performed by qualified nursing staff without the assistance of a Radiologist. PICC positioning appropriateness confirmed by 3CG technology; chest xray only needed in the instance 3CG unable to confirm placement.              Ultrasound-guided PICC placement performed by qualified nursing staff as above.     MR-HAND - WITH & W/O RIGHT    Result Date: 6/12/2023 6/12/2023 11:03 AM HISTORY/REASON FOR EXAM:  Right thumb pain and infection TECHNIQUE/EXAM DESCRIPTION: MRI of the RIGHT hand and wrist without and with contrast. The study was performed on a SampleBoarda 1.5 Brittani MRI scanner. Multiplanar T1 and T2 fat-suppressed images were obtained precontrast followed by T1 coronal, axial and sagittal fat-suppressed postcontrast  images. 15 mL ProHance contrast was administered intravenously. COMPARISON:  CT scan 6/11/2023 FINDINGS: Osseous structures: There is marrow edema and enhancement with erosive change of the first distal phalanx. There is also some punctate low signal involving the bone likely representing gas. There is mild edema and enhancement of the first proximal phalanx. There is multifocal osteoarthritis which involves the radiocarpal, distal radioulnar, triscaphe, first carpometacarpal, first MCP and pisiform articulations to the greatest degree. Soft tissues: There is diffuse edema and enhancement of the soft tissues of the thumb most prominently distally consistent with cellulitis. This extends into the thenar area. There is also edema and induration involving the dorsal and volar aspects of the hand. No evidence of tenosynovitis. No soft tissue abscess.     1.  Marrow edema, enhancement, and erosive change of the first distal phalanx with likely intramedullary gas. This most likely represents presence of osteomyelitis. 2.  Mild marrow edema and enhancement of the first proximal phalanx which may be reactive in nature versus representative of early osteomyelitis. 3.  Cellulitis involves the thumb distally to the greatest degree. This also extends proximally into the thenar area and extends along the dorsal and volar aspects of the hand. 4.  Multifocal osteoarthritis.    CT-HAND WITH RIGHT    Result Date: 6/11/2023 6/11/2023 1:13 PM HISTORY/REASON FOR EXAM:  Atraumatic right hand pain and swelling.. TECHNIQUE/ EXAM DESCRIPTION AND NUMBER OF VIEWS: CT scan of the RIGHT hand with contrast, with reconstructions. Thin-section noncontrast helical images were obtained from the distal radius/ulna through the proximal metacarpals. Coronal and sagittal reconstructions were generated from the axial images. A total of 75 mL of Omnipaque 350 nonionic contrast was administered IV without complication. Up to date radiation dose reduction  adjustments have been utilized to meet ALARA standards for radiation dose reduction. COMPARISON:  None. FINDINGS: There is erosive change seen involving the tuft of the first distal phalanx with mottled osteopenia. There is also some gas present within the intramedullary cavity of the first distal phalanx. No other evidence of fracture or bony destructive change. There is edema/induration involving the soft tissues of the thumb most prominently distally. No evidence of focal fluid collection. There is cellulitis extending into the remainder of the hand and wrist.     1.  Erosive change of the tuft of the first distal phalanx with osteopenia involving the remainder the phalanx with some intramedullary gas. This likely represents presence of osteomyelitis. 2.  Cellulitis involving the thumb most prominently distally. No focal fluid collection to suggest abscess. 3.  Cellulitis involving the remainder of the hand and wrist.    EC-ECHOCARDIOGRAM COMPLETE W/O CONT    Result Date: 2023  Transthoracic Echo Report Echocardiography Laboratory CONCLUSIONS Compared to the images of the prior study 2021, there has been no significant change. Normal transthoracic echocardiogram. BAILEE JACOBSON Exam Date:         2023                    09:10 Exam Location:     Inpatient Priority:          Routine Ordering Physician:        LITZY CHAUHAN Referring Physician: Sonographer:               Odilia MCDONALD Age:    58     Gender:    M MRN:    [id number] :    1965 BSA:    1.91   Ht (in):    72     Wt (lb):    154 Exam Type:     Complete Indications:     Syncope ICD Codes:       780.2 CPT Codes:       60380 BP:   116    /   73     HR:   78 Technical Quality:       Fair MEASUREMENTS  (Male / Female) Normal Values 2D ECHO LV Diastolic Diameter PLAX        4.8 cm                4.2 - 5.9 / 3.9 - 5.3 cm LV Systolic Diameter PLAX         3 cm                  2.1 - 4.0 cm IVS  Diastolic Thickness           0.95 cm               LVPW Diastolic Thickness          1.2 cm                LVOT Diameter                     2.1 cm                Estimated LV Ejection Fraction    65 %                  LV Ejection Fraction MOD BP       68.1 %                >= 55  % LV Ejection Fraction MOD 4C       65.2 %                LV Ejection Fraction MOD 2C       68 %                  IVC Diameter                      1.8 cm                DOPPLER AV Peak Velocity                  1.1 m/s               AV Peak Gradient                  4.7 mmHg              AV Mean Gradient                  2.4 mmHg              LVOT Peak Velocity                0.85 m/s              AV Area Cont Eq vti               2.8 cm2               Mitral E Point Velocity           0.86 m/s              Mitral E to A Ratio               1.3                   MV Pressure Half Time             54.9 ms               MV Area PHT                       4 cm2                 MV Deceleration Time              189 ms                * Indicates values subject to auto-interpretation LV EF:  65    % FINDINGS Left Ventricle Normal left ventricular chamber size. Normal left ventricular wall thickness. Normal left ventricular systolic function. The left ventricular ejection fraction is visually estimated to be 65%. Normal regional wall motion. Normal diastolic function. Right Ventricle Normal right ventricular size and systolic function. Right Atrium Normal right atrial size. Normal inferior vena cava size and inspiratory collapse. Left Atrium Normal left atrial size. Mitral Valve Structurally normal mitral valve without significant stenosis or regurgitation. Aortic Valve Structurally normal aortic valve without significant stenosis or regurgitation. Tricuspid aortic valve. Tricuspid Valve Structurally normal tricuspid valve without significant stenosis or regurgitation. Unable to estimate right ventricular systolic pressure due to an inadequate  tricuspid regurgitant jet. Right atrial pressure is estimated to be 3 mmHg. Pulmonic Valve Structurally normal pulmonic valve without significant stenosis or regurgitation. Pericardium No pericardial effusion. Aorta Normal aortic root for body surface area. The ascending aorta diameter is 3.3 cm. Ayo Crandall MD (Electronically Signed) Final Date:     09 June 2023                 12:25    DX-CHEST-PORTABLE (1 VIEW)    Result Date: 6/9/2023 6/9/2023 1:56 AM HISTORY/REASON FOR EXAM: Shortness of Breath TECHNIQUE/EXAM DESCRIPTION:  Single AP view of the chest. COMPARISON: March 18, 2022 FINDINGS: Overlying cardiac leads are present. The cardiac silhouette appears within normal limits. The mediastinal contour appears within normal limits.  The central pulmonary vasculature appears normal. Bilateral lung volumes are diminished.  Hazy linear density in the left lung base is observed. No significant pleural effusions are identified. The bony structures appear age-appropriate.     1.  Left basilar atelectasis, no focal infiltrate    CT-ABDOMEN-PELVIS WITH    Result Date: 6/8/2023 6/8/2023 5:11 PM HISTORY/REASON FOR EXAM:  IV contrast only. Abdominal pain TECHNIQUE/EXAM DESCRIPTION:   CT scan of the abdomen and pelvis with contrast. Contrast-enhanced helical scanning was obtained from the diaphragmatic domes through the pubic symphysis following the bolus administration of nonionic contrast without complication. 100 mL of Omnipaque 350 nonionic contrast was administered without complication. Low dose optimization technique was utilized for this CT exam including automated exposure control and adjustment of the mA and/or kV according to patient size. COMPARISON: CT abdomen pelvis 3/11/2023 FINDINGS: Lower Chest: Patchy consolidation of the left lower lobe.. Liver: Normal. Spleen: Unremarkable. Pancreas: Unremarkable. Gallbladder: No calcified stones. Biliary: Nondilated. Adrenal glands: Normal. Kidneys: Horseshoe  kidney. Mild hydronephrosis. Small, nonobstructing stones seen. Simple renal cyst at the inferior aspect. No follow up imaging is recommended per consensus guidelines of the 2019 ACR Incidental Findings Committee for probably benign incidental simple appearing renal cystic lesion(s) based on imaging criteria. Bowel: Mildly dilated loops of small bowel in the left abdomen with mild wall hyperenhancement. Distal colonic diverticulosis Lymph nodes: No adenopathy. Vasculature: Unremarkable. Peritoneum: Unremarkable without ascites. Musculoskeletal: No acute or destructive process. Remote T12/deformity Pelvis: No adenopathy or free fluid. Bladder is distended     1.  Horseshoe kidney with mild hydronephrosis, possibly related to bladder distention. No obstructing stone seen. 2.  Small, nonobstructing renal stones. 3.  Features suggestive of enteritis. No evidence of obstruction. 4.  Patchy consolidations of the left lower lobe, likely infectious or inflammatory.      Micro:  Results       Procedure Component Value Units Date/Time    Anaerobic Culture [[id number]] Collected: 06/12/23 1814    Order Status: Completed Specimen: Wound Updated: 06/15/23 1324     Significant Indicator NEG     Source WND     Site Right THUMB     Culture Result Culture in progress.    Narrative:      A Finger tissue  riught thumb tissue  Surgery - swabs received    CULTURE WOUND W/ GRAM STAIN [[id number]]  (Abnormal)  (Susceptibility) Collected: 06/12/23 1814    Order Status: Completed Specimen: Wound Updated: 06/15/23 1324     Significant Indicator POS     Source WND     Site Right THUMB     Culture Result -     Gram Stain Result No organisms seen.     Culture Result Staphylococcus aureus  Moderate growth  Methicillin sensitive by screening method        Staphylococcus epidermidis  Light growth  Susceptibilities in progress      Narrative:      A Finger tissue  riught thumb tissue  Surgery - swabs received    Susceptibility       Staphylococcus  "aureus (1)       Antibiotic Interpretation Microscan   Method Status    Azithromycin Resistant >4 mcg/mL JORDON Preliminary    Clindamycin Sensitive 0.5 mcg/mL JORDON Preliminary    Cefazolin Sensitive <=8 mcg/mL JORDON Preliminary    Cefepime Sensitive <=4 mcg/mL JORDON Preliminary    Ceftaroline Sensitive <=0.5 mcg/mL JORDON Preliminary    Daptomycin Sensitive 1 mcg/mL JORDON Preliminary    Erythromycin Resistant >4 mcg/mL JORDON Preliminary    Ampicillin/sulbactam Sensitive <=8/4 mcg/mL JORDON Preliminary    Vancomycin Sensitive 1 mcg/mL JORDON Preliminary    Oxacillin Sensitive <=0.25 mcg/mL JORDON Preliminary    Pip/Tazobactam Sensitive <=8 mcg/mL JORDON Preliminary    Trimeth/Sulfa Sensitive <=0.5/9.5 mcg/mL JORDON Preliminary    Tetracycline Sensitive <=4 mcg/mL JORDON Preliminary                       BLOOD CULTURE [[id number]] Collected: 06/09/23 1020    Order Status: Completed Specimen: Blood from Peripheral Updated: 06/14/23 1217     Significant Indicator NEG     Source BLD     Site PERIPHERAL     Culture Result No growth after 5 days of incubation.  Blood culture testing and Gram stain, if indicated, are  performed at [facility], [address] [place] [address].[place], [place].  Positive blood cultures are  sent to [facility], [address] [place] [address], for organism identification and  susceptibility testing.      Narrative:      Per Hospital Policy: Only change Specimen Src: to \"Line\" if  specified by physician order.  No site indicated    BLOOD CULTURE [[id number]] Collected: 06/09/23 1000    Order Status: Completed Specimen: Blood from Peripheral Updated: 06/14/23 1217     Significant Indicator NEG     Source BLD     Site PERIPHERAL     Culture Result No growth after 5 days of incubation.  Blood culture testing and Gram stain, if indicated, are  performed at [facility], [facility] [place]  [facility].[place], [place].  Positive blood cultures are  sent to [facility], " "[address] [place] [address], for organism identification and  susceptibility testing.      Narrative:      Per Hospital Policy: Only change Specimen Src: to \"Line\" if  specified by physician order.  Left AC    GRAM STAIN [[id number]] Collected: 06/12/23 1814    Order Status: Completed Specimen: Wound Updated: 06/13/23 1238     Significant Indicator .     Source WND     Site Right THUMB     Gram Stain Result No organisms seen.    Narrative:      A Finger tissue  riught thumb tissue  Surgery - swabs received    MRSA By PCR (Amp) [[id number]] Collected: 06/11/23 2030    Order Status: Completed Specimen: Respirate from Nares Updated: 06/12/23 1125     MRSA by PCR Negative    Narrative:      Collected By: [id number] EL EDUARDO    S. Aureus By PCR, Nasal Complete [[id number]]     Order Status: Canceled Specimen: Respirate     URINALYSIS [[id number]]  (Abnormal) Collected: 06/10/23 2001    Order Status: Completed Specimen: Urine, Clean Catch Updated: 06/10/23 2033     Color Yellow     Character Clear     Specific Gravity 1.020     Ph 5.5     Glucose 250 mg/dL      Ketones 40 mg/dL      Protein Negative mg/dL      Bilirubin Negative     Nitrite Negative     Leukocyte Esterase Negative     Occult Blood Negative     Micro Urine Req see below     Comment: Microscopic examination not performed when specimen is clear  and chemically negative for protein, blood, leukocyte esterase  and nitrite.         Narrative:      Collected By: [id number] IVÁN RAMOS I            Assessment:  Active Hospital Problems    Diagnosis     *Osteomyelitis of right hand (HCC) [M86.9]     Enteritis [K52.9]     Elevated troponin [R77.8]     Syncope [R55]     Acute metabolic encephalopathy [G93.41]     AP (abdominal pain) [R10.9]     Uncontrolled type 2 diabetes mellitus with hyperglycemia (HCC) [E11.65]     Dyslipidemia [E78.5]     Metabolic acidosis, increased anion gap [E87.29]     Diabetic ketoacidosis without coma (HCC) [E11.10]      Acute " osteomyelitis right thumb, MSSA  -sp Right thumb irrigation and excisional debridement down to bone of left thumb, wound size 1cm x 1cm  Right thumb bone biopsy +acute OM  Uncontrolled DM-GlycoHgb 15.2  DOA +amphetamines, opiates, oxycodone        PLAN:   Recommend 6 weeks IV Ancef to try to salvage digit  Prognosis poor-high risk for future amputation  PICC  Keep BS under 150 to help control current infection  Tetanus updated as no record of prior tetanus shot  Negative HIV and hep panel  Plan for SNF  Stop date IV antibiotics 7/25/2023    [facility] ID clinic 2-3 weeks after discharge OK to see Jacinta PATTERSON     Plan of care discussed with JENNIFER Soriano D.O.. Will continue to follow as needed

## 2023-06-16 NOTE — DISCHARGE PLANNING
Case Management Discharge Planning    Admission Date: 6/8/2023  GMLOS: 7.7  ALOS: 8    6-Clicks ADL Score: 22  6-Clicks Mobility Score: 21    Anticipated Discharge Dispo: Discharge Disposition: D/T to SNF with Medicare cert in anticipation of skilled care (03)    DME Needed: No    Action(s) Taken: Updated Provider/Nurse on Discharge Plan  SW followed up with [facility], spoke to Addy who confirmed insurance auth came back. Pending call back to see if facility is able to arrange transportation.     Escalations Completed: None    Medically Clear: Yes    Next Steps: Pending inquiry for LTAC, pending insurance auth for [facility]     Barriers to Discharge: Pending Placement    Is the patient up for discharge tomorrow: No

## 2023-06-16 NOTE — DISCHARGE SUMMARY
"Discharge Summary    CHIEF COMPLAINT ON ADMISSION  Chief Complaint   Patient presents with    Epigastric Pain     Pt reports pain above his belly button that has been there for a a \"few days\", pt has history of abd pain with GI follow up to happen 6/15  Pt states that over the last few days he has had three episodes of vomiting   Pt was initially hypotensive with pressure 70/30 upon EMS arrival post 1L IV fluids improved to 98/66 and 5 mg of morphine was given. Pt than became hypoxic requiring 3L NC. ERP called to bedside due to pt presentation          Reason for Admission  EMS    Admission Date  6/8/2023     CODE STATUS  Full Code    HPI & HOSPITAL COURSE  Patient is a 58-year-old male with history of diabetes presented on 6 8 with abdominal pain and right finger pain and swelling.  He has been feeling poorly over the last week and worse over the last 2 days prior to admission.  Worsening abdominal pain in the epigastric region nausea without diarrhea.  Patient was initially admitted to the medical floor however was transferred to the ICU secondary to syncope and the development of DKA with an anion gap of 27.  He was successfully treated with insulin drip IV fluids blood glucoses improved and his gap is closed and was transferred out of the intensive care unit the next day.  He continued to have significant pain and erythema at the right thumb and MRI showed concern of osteomyelitis therefore orthopedic surgery was consulted and he was taken to the OR for debridement as well as bone biopsy to see if osteomyelitis is in fact present.  The pathology from the biopsy showed confirmation of osteomyelitis, infectious disease was consulted and recommended 6 weeks of Ancef with a stop date on 7/25.  The PICC line has been placed and patient is medically ready to transfer over to skilled nursing for the duration of his antibiotics.    Therefore, he is discharged in good and stable condition to skilled nursing " facility.    The patient met 2-midnight criteria for an inpatient stay at the time of discharge.      FOLLOW UP ITEMS POST DISCHARGE  PCP-2 weeks  Orthopedic surgery-as needed    DISCHARGE DIAGNOSES  Principal Problem:    Osteomyelitis of right hand (HCC) (POA: Yes)  Active Problems:    Diabetic ketoacidosis without coma (HCC) (POA: Unknown)    Metabolic acidosis, increased anion gap (POA: Yes)    Uncontrolled type 2 diabetes mellitus with hyperglycemia (HCC) (POA: Yes)    Dyslipidemia (POA: Yes)    AP (abdominal pain) (POA: Yes)    Syncope (POA: Unknown)    Acute metabolic encephalopathy (POA: Unknown)    Enteritis (POA: Unknown)    Elevated troponin (POA: Unknown)  Resolved Problems:    * No resolved hospital problems. *      FOLLOW UP  No future appointments.  [facility]  [address]  [place] [address]  [phone]          MEDICATIONS ON DISCHARGE     Medication List        START taking these medications        Instructions   insulin GLARGINE 100 UNIT/ML Soln  Commonly known as: Lantus,Semglee   Inject 20 Units under the skin 2 times a day.  Dose: 20 Units     insulin lispro 100 UNIT/ML Sopn injection PEN  Commonly known as: HumaLOG,AdmeLOG   Inject 3-14 Units under the skin 4 Times a Day,Before Meals and at Bedtime. 70   - 150  mg/dL =    0 Units  151 - 200  mg/dL =    3 Units  201 - 250  mg/dL =    4 Units  251 - 300  mg/dL  =   7 Units  301 - 350  mg/dL  =   10 Units  351 - 400 mg/dL   =   12 Units  Over 400 mg/dL   =   14 Units  Dose: 3-14 Units     NS SOLN 100 mL with ceFAZolin 2 g SOLR 2 g   Infuse 2 g into a venous catheter every 8 hours for 39 days.  Dose: 2 g     omeprazole 40 MG delayed-release capsule  Commonly known as: PRILOSEC   Take 1 Capsule by mouth 2 times a day for 14 days.  Dose: 40 mg     sucralfate 1 GM Tabs  Commonly known as: CARAFATE   Take 1 Tablet by mouth 4 Times a Day,Before Meals and at Bedtime for 20 days. take one hour before meals and at bedtime.  Take it for 3 days  then as needed for pain.  Dose: 1 g            CHANGE how you take these medications        Instructions   oxyCODONE immediate-release 5 MG Tabs  What changed:   when to take this  reasons to take this  Commonly known as: ROXICODONE   Take 1 Tablet by mouth every four hours as needed for Severe Pain for up to 2 days.  Dose: 5 mg            CONTINUE taking these medications        Instructions   One-A-Day Mens Tabs   Take 1 Tablet by mouth every day.  Dose: 1 Tablet            STOP taking these medications      HumuLIN 70/30 (70-30) 100 UNIT/ML Susp  Generic drug: insulin 70/30              Allergies  No Known Allergies    DIET  Orders Placed This Encounter   Procedures    Diet Order Diet: Consistent CHO (Diabetic) (double portions); Tray Modifications (optional): Double Portions     Standing Status:   Standing     Number of Occurrences:   1     Order Specific Question:   Diet:     Answer:   Consistent CHO (Diabetic) [4]     Comments:   double portions     Order Specific Question:   Tray Modifications (optional)     Answer:   Double Portions       ACTIVITY  As tolerated.  Weight bearing as tolerated    LINES, DRAINS, AND WOUNDS  This is an automated list. Peripheral IVs will be removed prior to discharge.  PICC Single Lumen 06/14/23 Left Basilic (Active)   Site Assessment Clean;Dry;Intact 06/16/23 0800   Line Status Scrubbed the hub prior to access;Flushed;Blood return noted;Saline locked 06/16/23 0800   Line Secured at (cm) 1 cm 06/14/23 1642   Extremity Circumference (cm) 30 cm 06/14/23 1642   Dressing Type Biopatch;Occlusive 06/16/23 0800   Dressing Status Clean;Dry;Intact 06/16/23 0800   Dressing Intervention N/A 06/16/23 0800   Dressing Change Due 06/17/23 06/16/23 0800   Date IV Connector(s) Changed 06/14/23 06/14/23 1642   NEXT IV Connector(s) Change 06/17/23 06/14/23 1642   Line Necessity Assessed Antibiotic Therapy Greater than 7 Days 06/16/23 0800   $ Single Lumen PICC Charge Second kit used 06/14/23 1642        Wound 03/11/23 Hand;Finger, Thumb;Finger, 2nd;Finger, 3rd;Finger, 4th;Finger, 5th Left (Active)   Wound Image   06/15/23 1457   Site Assessment Red;Pink;Edema;Painful 06/15/23 1457   Periwound Assessment Clean;Dry 06/15/23 1457   Margins Defined edges 06/15/23 1457   Closure Secondary intention 06/15/23 1457   Drainage Amount None 06/15/23 1457   Treatments Site care;Offloading;Cleansed 06/15/23 1457   Wound Cleansing Dakin's Solution 06/15/23 1457   Dressing Cleansing/Solutions 1/4 Strength Dakin's Solution 06/15/23 1457   Dressing Options Dry Roll Gauze;Coban 06/15/23 1457   Dressing Changed New 06/15/23 1457   Dressing Status Clean;Dry;Intact 06/15/23 1457   Dressing Change/Treatment Frequency Every Shift, and As Needed 06/16/23 0800   NEXT Dressing Change/Treatment Date 06/16/23 06/16/23 0800   Pulses Thready 06/11/23 2040       Wound 03/11/23 Hand Right (Active)       Wound 03/11/23 Toe, Hallux Left (Active)       Wound 06/12/23 Incision Finger, Thumb Right 4x4, nathaly, coban  (Active)   Wound Image     06/13/23 2000   Site Assessment KATHERINE 06/16/23 0800   Periwound Assessment KATHERINE 06/16/23 0800   Margins KATHERINE 06/16/23 0800   Closure KATHERINE 06/16/23 0800   Drainage Amount KATHERINE 06/16/23 0800   Drainage Description KATHERINE 06/16/23 0800   Treatments Cleansed 06/15/23 0600   Wound Cleansing Dakin's Solution 06/15/23 0600   Periwound Protectant Not Applicable 06/13/23 2000   Dressing Cleansing/Solutions Normal Saline 06/14/23 1838   Dressing Options Dry Gauze;Coban 06/15/23 0600   Dressing Changed Changed 06/15/23 0600   Dressing Status Clean;Dry;Intact 06/15/23 0600   Dressing Change/Treatment Frequency Every Shift, and As Needed 06/15/23 0600   NEXT Dressing Change/Treatment Date 06/15/23 06/15/23 0600   NEXT Weekly Photo (Inpatient Only) 06/20/23 06/13/23 2000   Non-staged Wound Description Full thickness 06/13/23 2000   Wound Length (cm) 0.4 cm 06/13/23 2000   Wound Width (cm) 2 cm 06/13/23 2000   Wound Depth (cm) 0.3  cm 06/13/23 2000   Wound Surface Area (cm^2) 0.8 cm^2 06/13/23 2000   Wound Volume (cm^3) 0.24 cm^3 06/13/23 2000   Shape circular and linear 06/13/23 2000   Wound Odor None 06/13/23 2000   Pulses 1+ 06/12/23 2030   Exposed Structures KATHERINE 06/13/23 2000      PICC Single Lumen 06/14/23 Left Basilic (Active)   Site Assessment Clean;Dry;Intact 06/16/23 0800   Line Status Scrubbed the hub prior to access;Flushed;Blood return noted;Saline locked 06/16/23 0800   Line Secured at (cm) 1 cm 06/14/23 1642   Extremity Circumference (cm) 30 cm 06/14/23 1642   Dressing Type Biopatch;Occlusive 06/16/23 0800   Dressing Status Clean;Dry;Intact 06/16/23 0800   Dressing Intervention N/A 06/16/23 0800   Dressing Change Due 06/17/23 06/16/23 0800   Date IV Connector(s) Changed 06/14/23 06/14/23 1642   NEXT IV Connector(s) Change 06/17/23 06/14/23 1642   Line Necessity Assessed Antibiotic Therapy Greater than 7 Days 06/16/23 0800   $ Single Lumen PICC Charge Second kit used 06/14/23 1642                MENTAL STATUS ON TRANSFER             CONSULTATIONS  Orthopedics-  Infectious disease-Dr. Dave    PROCEDURES  6/12 --incision and debridement of right thumb and right thumb bone biopsy    LABORATORY  Lab Results   Component Value Date    SODIUM 139 06/15/2023    POTASSIUM 4.1 06/15/2023    CHLORIDE 101 06/15/2023    CO2 30 06/15/2023    GLUCOSE 133 (H) 06/15/2023    BUN 13 06/15/2023    CREATININE 0.64 06/15/2023        Lab Results   Component Value Date    WBC 5.6 06/15/2023    HEMOGLOBIN 10.0 (L) 06/15/2023    HEMATOCRIT 30.6 (L) 06/15/2023    PLATELETCT 239 06/15/2023        Total time of the discharge process exceeds 38 minutes.

## 2023-06-16 NOTE — PROGRESS NOTES
Received report from Manda CASPER, at pt bedside. Pt C/O of throbbing pain 3/10 in right hand, thumb. Medicated accordingly. POC discussed. Call light and belongings within reach. Bed locked and in low position. Alarm on and fall precautions in place.

## 2023-06-16 NOTE — CARE PLAN
The patient is Stable - Low risk of patient condition declining or worsening    Shift Goals  Clinical Goals: pain management, BS control, abx, wound care qshift  Patient Goals: Get more food  Family Goals: KATHERINE    Progress made toward(s) clinical / shift goals:    Problem: Pain - Standard  Goal: Alleviation of pain or a reduction in pain to the patient’s comfort goal  Outcome: Progressing     Problem: Knowledge Deficit - Standard  Goal: Patient and family/care givers will demonstrate understanding of plan of care, disease process/condition, diagnostic tests and medications  Outcome: Progressing     Problem: Hemodynamics  Goal: Patient's hemodynamics, fluid balance and neurologic status will be stable or improve  Outcome: Progressing       Patient is not progressing towards the following goals:  Problem: Physical Regulation  Goal: Signs and symptoms of infection will decrease  Outcome: Not Progressing  Note: Pt will continue outpt antibiotics through picc line, pt educated on the signs and symptoms of worsening infection, pt understands how to keep dressing clean, dressing changes done once a shift and as needed

## 2023-06-17 LAB
GLUCOSE BLD STRIP.AUTO-MCNC: 171 MG/DL (ref 65–99)
GLUCOSE BLD STRIP.AUTO-MCNC: 188 MG/DL (ref 65–99)
GLUCOSE BLD STRIP.AUTO-MCNC: 218 MG/DL (ref 65–99)
GLUCOSE BLD STRIP.AUTO-MCNC: 248 MG/DL (ref 65–99)
GLUCOSE BLD STRIP.AUTO-MCNC: 248 MG/DL (ref 65–99)
GLUCOSE BLD STRIP.AUTO-MCNC: 257 MG/DL (ref 65–99)

## 2023-06-21 ENCOUNTER — TELEPHONE (OUTPATIENT)
Dept: INFECTIOUS DISEASES | Facility: MEDICAL CENTER | Age: 58
End: 2023-06-21
Payer: COMMERCIAL

## 2023-06-21 NOTE — TELEPHONE ENCOUNTER
FABIOLA villalba at [facility] to return my call to schedule patient

## 2023-06-28 ENCOUNTER — TELEPHONE (OUTPATIENT)
Dept: INFECTIOUS DISEASES | Facility: MEDICAL CENTER | Age: 58
End: 2023-06-28
Payer: COMMERCIAL

## 2023-06-29 ENCOUNTER — TELEPHONE (OUTPATIENT)
Dept: INFECTIOUS DISEASES | Facility: MEDICAL CENTER | Age: 58
End: 2023-06-29
Payer: COMMERCIAL

## 2023-06-29 ENCOUNTER — HOSPITAL ENCOUNTER (OUTPATIENT)
Dept: RADIOLOGY | Facility: MEDICAL CENTER | Age: 58
End: 2023-06-29
Attending: INTERNAL MEDICINE
Payer: COMMERCIAL

## 2023-06-29 DIAGNOSIS — L40.3 SAPHO SYNDROME (HCC): ICD-10-CM

## 2023-06-29 DIAGNOSIS — M86.9 SAPHO SYNDROME (HCC): ICD-10-CM

## 2023-06-29 DIAGNOSIS — M85.80 SAPHO SYNDROME (HCC): ICD-10-CM

## 2023-06-29 DIAGNOSIS — L70.9 SAPHO SYNDROME (HCC): ICD-10-CM

## 2023-06-29 DIAGNOSIS — M65.9 SAPHO SYNDROME (HCC): ICD-10-CM

## 2023-06-29 PROCEDURE — 36573 INSJ PICC RS&I 5 YR+: CPT

## 2023-06-29 NOTE — TELEPHONE ENCOUNTER
I received a call from member services at [facility], who had patient on the line. Patient called [facility] because he stated that [facility] has removed his PICC line and he has not been receiving his IV abx treatment due to end 7/25/23. I advised both patient and [facility] that I would speak with facility and find out what is going on. I spoke with Marisel infection control nurse at [facility]. She informed me that PICC line for patient did come out and he is currently awaiting appointment to have a new one placed in the mean time patient is receiving his abx infusion peripherally. Marisel will fax records to my office and I will get these scanned in. I spoke with patient and informed him of this. Patient stated that his PICC line did not come off patient stated that the Doctor at [facility] ordered PICC line to be removed and patient woke up one day to nurse removing his PICC line. Patient is concerned, [facility] line and IV abx orders and he doesn't understand why [facility] would remove this. I advised patient I am currently awaiting the records to be sent to our office for our review. Patient appreciated the update.

## 2023-06-29 NOTE — PROGRESS NOTES
Vascular Access Team    Date of Insertion: 06/29/2023  Arm Circumference: 28.5 cm  Internal length: 44 cm  External Length: 0 cm  Vein Occupancy: 19 %  Reason for PICC: ABX > 29 DAYS  Labs within procedure parameters.    Ultrasound images uploaded to PACS and viewable in the EMR - yes  Ultrasound images printed and placed in paper chart - no     BARD Power PICC Lot #: [id number]  Expiration Date: 04/30/2024    Chart reviewed for provider order, indications and contraindications for peripherally inserted central catheter. Labs reviewed and are within procedure parameters. Nursing care plan includes knowledge deficit, potential for pain and anxiety, potential for infection. POC: patient teaching, comfort measures, and sterile technique using five step bundle to prevent CR-BSI. Risks and benefits of procedure explained to patient emphasizing risk of central bloodstream infection. Questions answered. Patient verbalized understanding. Consent signed by patient.     Maximum barrier precautions and aseptic technique used. 2ml of 1% lidocaine injected intradermally to insertion site at LUE. 21 gauge micro-introducer needle used to access BASILIC vein. Modified Seldinger technique used to insert 4 fr single lumen 44 cm catheter with brisk blood return noted through each lumen. Each lumen flushed with 10 ml normal saline using pulsatile method without resistance. PICC secured with Statlock at 0 cm abdelrahman. Biopatch and Tegaderm applied over insertion site. 3CG/EKG technology used with appropriate waveform electronically added to chart via APR.      # of attempts: one      Time out and LDA documentation completed. Patient condition and procedure outcome reported to unit RN and /or ordering provider via this post-procedure note.     Patient educated re: PICC care. Written post-procedure instructions given to patient.

## 2023-07-19 ENCOUNTER — TELEPHONE (OUTPATIENT)
Dept: INFECTIOUS DISEASES | Facility: MEDICAL CENTER | Age: 58
End: 2023-07-19
Payer: COMMERCIAL

## 2023-08-01 ENCOUNTER — OFFICE VISIT (OUTPATIENT)
Dept: INFECTIOUS DISEASES | Facility: MEDICAL CENTER | Age: 58
End: 2023-08-01
Attending: NURSE PRACTITIONER
Payer: COMMERCIAL

## 2023-08-01 ENCOUNTER — HOSPITAL ENCOUNTER (OUTPATIENT)
Dept: LAB | Facility: MEDICAL CENTER | Age: 58
End: 2023-08-01
Attending: NURSE PRACTITIONER
Payer: COMMERCIAL

## 2023-08-01 ENCOUNTER — TELEPHONE (OUTPATIENT)
Dept: INFECTIOUS DISEASES | Facility: MEDICAL CENTER | Age: 58
End: 2023-08-01

## 2023-08-01 VITALS
BODY MASS INDEX: 25.06 KG/M2 | HEART RATE: 77 BPM | SYSTOLIC BLOOD PRESSURE: 108 MMHG | OXYGEN SATURATION: 96 % | HEIGHT: 72 IN | DIASTOLIC BLOOD PRESSURE: 60 MMHG | RESPIRATION RATE: 16 BRPM | TEMPERATURE: 98.3 F | WEIGHT: 185 LBS

## 2023-08-01 DIAGNOSIS — E11.65 UNCONTROLLED TYPE 2 DIABETES MELLITUS WITH HYPERGLYCEMIA (HCC): ICD-10-CM

## 2023-08-01 DIAGNOSIS — M86.241 SUBACUTE OSTEOMYELITIS OF RIGHT HAND (HCC): ICD-10-CM

## 2023-08-01 DIAGNOSIS — A49.01 MSSA (METHICILLIN SUSCEPTIBLE STAPHYLOCOCCUS AUREUS) INFECTION: ICD-10-CM

## 2023-08-01 LAB
ALBUMIN SERPL BCP-MCNC: 3.8 G/DL (ref 3.2–4.9)
ALBUMIN/GLOB SERPL: 1.5 G/DL
ALP SERPL-CCNC: 522 U/L (ref 30–99)
ALT SERPL-CCNC: 140 U/L (ref 2–50)
ANION GAP SERPL CALC-SCNC: 10 MMOL/L (ref 7–16)
AST SERPL-CCNC: 20 U/L (ref 12–45)
BASOPHILS # BLD AUTO: 0.2 % (ref 0–1.8)
BASOPHILS # BLD: 0.01 K/UL (ref 0–0.12)
BILIRUB SERPL-MCNC: 0.5 MG/DL (ref 0.1–1.5)
BUN SERPL-MCNC: 26 MG/DL (ref 8–22)
CALCIUM ALBUM COR SERPL-MCNC: 9.7 MG/DL (ref 8.5–10.5)
CALCIUM SERPL-MCNC: 9.5 MG/DL (ref 8.5–10.5)
CHLORIDE SERPL-SCNC: 92 MMOL/L (ref 96–112)
CO2 SERPL-SCNC: 30 MMOL/L (ref 20–33)
CREAT SERPL-MCNC: 0.59 MG/DL (ref 0.5–1.4)
EOSINOPHIL # BLD AUTO: 0.12 K/UL (ref 0–0.51)
EOSINOPHIL NFR BLD: 1.8 % (ref 0–6.9)
ERYTHROCYTE [DISTWIDTH] IN BLOOD BY AUTOMATED COUNT: 40.6 FL (ref 35.9–50)
GFR SERPLBLD CREATININE-BSD FMLA CKD-EPI: 112 ML/MIN/1.73 M 2
GLOBULIN SER CALC-MCNC: 2.6 G/DL (ref 1.9–3.5)
GLUCOSE SERPL-MCNC: 506 MG/DL (ref 65–99)
HCT VFR BLD AUTO: 40.9 % (ref 42–52)
HGB BLD-MCNC: 13.3 G/DL (ref 14–18)
IMM GRANULOCYTES # BLD AUTO: 0.01 K/UL (ref 0–0.11)
IMM GRANULOCYTES NFR BLD AUTO: 0.2 % (ref 0–0.9)
LYMPHOCYTES # BLD AUTO: 0.99 K/UL (ref 1–4.8)
LYMPHOCYTES NFR BLD: 15.2 % (ref 22–41)
MCH RBC QN AUTO: 27.2 PG (ref 27–33)
MCHC RBC AUTO-ENTMCNC: 32.5 G/DL (ref 32.3–36.5)
MCV RBC AUTO: 83.6 FL (ref 81.4–97.8)
MONOCYTES # BLD AUTO: 0.49 K/UL (ref 0–0.85)
MONOCYTES NFR BLD AUTO: 7.5 % (ref 0–13.4)
NEUTROPHILS # BLD AUTO: 4.9 K/UL (ref 1.82–7.42)
NEUTROPHILS NFR BLD: 75.1 % (ref 44–72)
NRBC # BLD AUTO: 0 K/UL
NRBC BLD-RTO: 0 /100 WBC (ref 0–0.2)
PLATELET # BLD AUTO: 257 K/UL (ref 164–446)
PMV BLD AUTO: 9.9 FL (ref 9–12.9)
POTASSIUM SERPL-SCNC: 4.5 MMOL/L (ref 3.6–5.5)
PROT SERPL-MCNC: 6.4 G/DL (ref 6–8.2)
RBC # BLD AUTO: 4.89 M/UL (ref 4.7–6.1)
SODIUM SERPL-SCNC: 132 MMOL/L (ref 135–145)
WBC # BLD AUTO: 6.5 K/UL (ref 4.8–10.8)

## 2023-08-01 PROCEDURE — 3078F DIAST BP <80 MM HG: CPT | Performed by: NURSE PRACTITIONER

## 2023-08-01 PROCEDURE — 99212 OFFICE O/P EST SF 10 MIN: CPT | Performed by: NURSE PRACTITIONER

## 2023-08-01 PROCEDURE — 80053 COMPREHEN METABOLIC PANEL: CPT

## 2023-08-01 PROCEDURE — 99211 OFF/OP EST MAY X REQ PHY/QHP: CPT | Performed by: NURSE PRACTITIONER

## 2023-08-01 PROCEDURE — 85025 COMPLETE CBC W/AUTO DIFF WBC: CPT

## 2023-08-01 PROCEDURE — 3074F SYST BP LT 130 MM HG: CPT | Performed by: NURSE PRACTITIONER

## 2023-08-01 PROCEDURE — 36415 COLL VENOUS BLD VENIPUNCTURE: CPT

## 2023-08-01 RX ORDER — OXYCODONE HYDROCHLORIDE 5 MG/1
5 TABLET ORAL EVERY 8 HOURS PRN
COMMUNITY
End: 2024-01-26

## 2023-08-01 RX ORDER — ONDANSETRON 4 MG/1
4 TABLET, ORALLY DISINTEGRATING ORAL 2 TIMES DAILY PRN
COMMUNITY
End: 2024-01-26

## 2023-08-01 RX ORDER — OMEPRAZOLE 20 MG/1
CAPSULE, DELAYED RELEASE ORAL
COMMUNITY
Start: 2023-05-04 | End: 2023-08-01

## 2023-08-01 RX ORDER — CYCLOBENZAPRINE HCL 10 MG
1 TABLET ORAL 3 TIMES DAILY PRN
COMMUNITY
End: 2023-08-01

## 2023-08-01 RX ORDER — ASPIRIN 81 MG/1
TABLET, CHEWABLE ORAL
COMMUNITY
End: 2023-08-01

## 2023-08-01 RX ORDER — POLYETHYLENE GLYCOL-3350 AND ELECTROLYTES 236; 6.74; 5.86; 2.97; 22.74 G/274.31G; G/274.31G; G/274.31G; G/274.31G; G/274.31G
POWDER, FOR SOLUTION ORAL
COMMUNITY
Start: 2023-05-04 | End: 2023-08-01

## 2023-08-01 ASSESSMENT — ENCOUNTER SYMPTOMS
WHEEZING: 0
SPUTUM PRODUCTION: 0
BLURRED VISION: 0
ABDOMINAL PAIN: 0
BRUISES/BLEEDS EASILY: 0
NERVOUS/ANXIOUS: 0
MYALGIAS: 0
CHILLS: 0
WEIGHT LOSS: 0
SHORTNESS OF BREATH: 0
COUGH: 0
DIZZINESS: 0
NAUSEA: 0
FOCAL WEAKNESS: 0
VOMITING: 0
DOUBLE VISION: 0
HEADACHES: 0
FEVER: 0
PALPITATIONS: 0

## 2023-08-01 ASSESSMENT — FIBROSIS 4 INDEX: FIB4 SCORE: 0.67

## 2023-08-01 NOTE — PROGRESS NOTES
INFECTIOUS  DISEASE  OUTPATIENT CLINIC  NOTE   Subjective   Primary care provider: DIANA Tariq.     Reason for Follow Up:   Follow-up for   1. Uncontrolled type 2 diabetes mellitus with hyperglycemia (HCC)  CBC WITH DIFFERENTIAL    Comp Metabolic Panel      2. Subacute osteomyelitis of right hand (HCC)        3. MSSA (methicillin susceptible Staphylococcus aureus) infection            HPI: Patient previously seen and treated by ID team as inpatient during hospital admission.   Christoph Taylor is a 58-year-old male with history of uncontrolled Type 2 diabetes presented on 6/8/23 with abdominal pain and right finger pain and swelling.  Patient was initially admitted to the medical floor however was transferred to the ICU secondary to syncope and the development of DKA. He continued to have significant pain and erythema at the right thumb and MRI showed concern of osteomyelitis therefore orthopedic surgery was consulted and he was taken to the OR 6/12 for debridement as well as bone biopsy. Right thumb I&D down to bone of left thumb. Pathology with abscess and acute  osteomyelitis of underlying bone. OR Culture + MSSA + Staphylococcus epidermidis ( Oxacillin sensitive).  Patient was discharged on 6-week course of IV Ancef, stop date 7/25/2023 08/01/23- Today Patient reports feeling well. Pt stating that the wound is completely healed. Wound pictures reviewed in EPIC.  Right thumb with decreased ROM.  No surrounding erythema, swelling or drainage. Denies drainage, pungent odor, redness, pain. Denies feeling generally ill, fevers/chills, general malaise, headache, n/v/d.  Tolerated IV Ancef with no complaints of side effects, completed on 7/25/2023 most recent labs from hospital admission from 6/15/2023, WBC WNL 5.6, stable but slowly trending down anemia 10.0/30.6, CMP with elevated glucose 133, and hypocalcemia 7.7 GFR and creatinine WNL. A1C from 3/12/23- 15.2.  Recommend close follow-up with PCP  for DM management as patient is at high risk for recurrence of infection in additional damage due to uncontrolled diabetes    Current Antimicrobials: None  Previous Antimicrobials: Unasyn, doxy, Ancef    Other Current Medications:  Home Medications    Medication Sig Taking? Last Dose Authorizing Provider   Vancomycin HCl (VANCOMYCIN 50 MG/ML) 50 mg/mL Solution Take 125 mg by mouth every 6 hours. Yes Taking Physician Outpatient   oxyCODONE immediate-release (ROXICODONE) 5 MG Tab Take 5 mg by mouth every four hours as needed for Severe Pain. Yes Taking Physician Outpatient   ondansetron (ZOFRAN ODT) 4 MG TABLET DISPERSIBLE Take 4 mg by mouth every 6 hours as needed for Nausea/Vomiting. Yes Taking Physician Outpatient   SUCRALFATE PO Take  by mouth. Yes Taking Physician Outpatient   insulin GLARGINE (LANTUS,SEMGLEE) 100 UNIT/ML Solution Inject 20 Units under the skin 2 times a day. Yes Taking Mandie S Bo, D.O.   insulin lispro 100 UNIT/ML SC SOPN injection PEN Inject 3-14 Units under the skin 4 Times a Day,Before Meals and at Bedtime. 70   - 150  mg/dL =    0 Units  151 - 200  mg/dL =    3 Units  201 - 250  mg/dL =    4 Units  251 - 300  mg/dL  =   7 Units  301 - 350  mg/dL  =   10 Units  351 - 400 mg/dL   =   12 Units  Over 400 mg/dL   =   14 Units Yes Taking Mandie S Bo, D.O.   Multiple Vitamin (ONE-A-DAY MENS) Tab Take 1 Tablet by mouth every day. Yes Taking Physician Outpatient        PMH:  Past Medical History:   Diagnosis Date    Abnormal electrocardiogram (ECG) (EKG) 11/8/2021    KRISTAL (acute kidney injury) ([facility]) 11/8/2021    Chest pain in adult 11/8/2021    CKD (chronic kidney disease) stage 3, GFR 30-59 ml/min ([facility]) 11/7/2021    Diabetes ([facility])     Diabetic ketoacidosis without coma associated with type 2 diabetes mellitus ([facility]) 11/7/2021    Diarrhea 3/18/2022    DKA, type 1, not at goal ([facility]) 7/28/2022    Esophagitis 7/28/2022    [place] ([place] hearing[place]     Very [place] No hearing aides    Hypercholesteremia       Past Surgical History:   Procedure Laterality Date    FINGER OR HAND INCISION AND DRAINAGE Right 6/12/2023    Procedure: INCISION AND DRAINAGE, HAND - THUMB;  Surgeon: Ace Shaw M.D.;  Location: SURGERY [facility];  Service: Orthopedics    OR UPPER GI ENDOSCOPY,DIAGNOSIS N/A 3/14/2023    Procedure: GASTROSCOPY;  Surgeon: Atilio Freed M.D.;  Location: SURGERY [facility];  Service: Gastroenterology    OTHER      appy, lumbar fusion     Family History   Problem Relation Age of Onset    Heart Disease Father      Social History     Socioeconomic History    Marital status: Single     Spouse name: Not on file    Number of children: Not on file    Years of education: Not on file    Highest education level: Not on file   Occupational History    Not on file   Tobacco Use    Smoking status: Former    Smokeless tobacco: Never   Vaping Use    Vaping Use: Not on file   Substance and Sexual Activity    Alcohol use: Not Currently    Drug use: Not Currently    Sexual activity: Not on file   Other Topics Concern    Not on file   Social History Narrative    Not on file     Social Determinants of Health     Financial Resource Strain: Not on file   Food Insecurity: Not on file   Transportation Needs: Not on file   Physical Activity: Not on file   Stress: Not on file   Social Connections: Not on file   Intimate Partner Violence: Not on file   Housing Stability: Not on file           Allergies/Intolerances:  No Known Allergies    ROS:   Review of Systems   Constitutional:  Negative for chills, fever, malaise/fatigue and weight loss.   HENT:  Negative for congestion and hearing loss.    Eyes:  Negative for blurred vision and double vision.   Respiratory:  Negative for cough, sputum production, shortness of breath and wheezing.    Cardiovascular:  Negative for chest pain and palpitations.   Gastrointestinal:  Negative for abdominal pain, nausea and vomiting.   Genitourinary:  Negative for dysuria.   Musculoskeletal:   Negative for myalgias.        Decreased range of motion of right hand and thumb   Skin:  Negative for itching and rash.   Neurological:  Negative for dizziness, focal weakness and headaches.   Endo/Heme/Allergies:  Does not bruise/bleed easily.   Psychiatric/Behavioral:  The patient is not nervous/anxious.       ROS was reviewed and were negative except as above.    Objective    Most Recent Vital Signs:  Blood Pressure 108/60 (BP Location: Left arm, Patient Position: Sitting, BP Cuff Size: Adult)   Pulse 77   Temperature 36.8 °C (98.3 °F) (Temporal)   Respiration 16   Height 1.829 m (6')   Weight 83.9 kg (185 lb)   Oxygen Saturation 96%   Body Mass Index 25.09 kg/m²     Physical Exam:  Physical Exam  Vitals reviewed.   Constitutional:       Appearance: Normal appearance.   HENT:      Head: Normocephalic and atraumatic.      Nose: Nose normal.      Mouth/Throat:      Mouth: Mucous membranes are moist.   Eyes:      Pupils: Pupils are equal, round, and reactive to light.   Cardiovascular:      Rate and Rhythm: Normal rate.   Pulmonary:      Effort: Pulmonary effort is normal.      Breath sounds: Normal breath sounds.   Abdominal:      Palpations: Abdomen is soft.   Musculoskeletal:         General: No tenderness.      Cervical back: Normal range of motion and neck supple.      Comments: Right thumb previous surgical wound completely healed without any surrounding erythema, swelling or drainage.  Decreased ROM of right thumb   Skin:     General: Skin is warm and dry.      Coloration: Skin is not jaundiced.      Findings: No erythema or rash.   Neurological:      Mental Status: He is alert and oriented to person, place, and time.      Motor: Weakness present.      Comments: Chronic weakness   Psychiatric:         Mood and Affect: Mood normal.         Behavior: Behavior normal.          Pertinent Lab/Imaging Results:  [unfilled]  @CMP@  WBC   Date/Time Value Ref Range Status   06/15/2023 02:00 AM 5.6 4.8 - 10.8 K/uL  Final     RBC   Date/Time Value Ref Range Status   06/15/2023 02:00 AM 3.57 (L) 4.70 - 6.10 M/uL Final     Hemoglobin   Date/Time Value Ref Range Status   06/15/2023 02:00 AM 10.0 (L) 14.0 - 18.0 g/dL Final     Hematocrit   Date/Time Value Ref Range Status   06/15/2023 02:00 AM 30.6 (L) 42.0 - 52.0 % Final     MCV   Date/Time Value Ref Range Status   06/15/2023 02:00 AM 85.7 81.4 - 97.8 fL Final     MCH   Date/Time Value Ref Range Status   06/15/2023 02:00 AM 28.0 27.0 - 33.0 pg Final     MCHC   Date/Time Value Ref Range Status   06/15/2023 02:00 AM 32.7 32.3 - 36.5 g/dL Final     Comment:     Please note new reference range effective 05/22/2023.     MPV   Date/Time Value Ref Range Status   06/15/2023 02:00 AM 8.9 (L) 9.0 - 12.9 fL Final      Sodium   Date/Time Value Ref Range Status   06/15/2023 02:00  135 - 145 mmol/L Final     Potassium   Date/Time Value Ref Range Status   06/15/2023 02:00 AM 4.1 3.6 - 5.5 mmol/L Final     Chloride   Date/Time Value Ref Range Status   06/15/2023 02:00  96 - 112 mmol/L Final     Co2   Date/Time Value Ref Range Status   06/15/2023 02:00 AM 30 20 - 33 mmol/L Final     Glucose   Date/Time Value Ref Range Status   06/15/2023 02:00  (H) 65 - 99 mg/dL Final     Bun   Date/Time Value Ref Range Status   06/15/2023 02:00 AM 13 8 - 22 mg/dL Final     Creatinine   Date/Time Value Ref Range Status   06/15/2023 02:00 AM 0.64 0.50 - 1.40 mg/dL Final     Bun-Creatinine Ratio   Date/Time Value Ref Range Status   11/11/2021 02:47 PM 15.7  Final     Alkaline Phosphatase   Date/Time Value Ref Range Status   06/09/2023 12:36 AM 92 30 - 99 U/L Final     AST(SGOT)   Date/Time Value Ref Range Status   06/09/2023 12:36 AM 9 (L) 12 - 45 U/L Final     ALT(SGPT)   Date/Time Value Ref Range Status   06/09/2023 12:36 AM 11 2 - 50 U/L Final     Total Bilirubin   Date/Time Value Ref Range Status   06/09/2023 12:36 AM 0.3 0.1 - 1.5 mg/dL Final      No results found for: CPKTOTAL       No  results found for: BLOODCULTU, BLDCULT, BCHOLD    No results found for: BLOODCULTU, BLDCULT, BCHOLD       [facility]                           DEPARTMENT OF PATHOLOGY                     [address] [place]  [address]               Phone [phone]          Fax [phone]   Sonny Whiting M.D.     Alyson Ge M.D.     Mary Jane Ayala M.D.                              Sherri Gonzalez M.D.      VJ Persaud D.O. Aaron G. Novotny, D.O.     Patient:    BAILEE JACOBSON         Specimen    [id number]                                            #:       Copies to:                         SURGICAL PATHOLOGY CONSULTATION       FINAL DIAGNOSIS:     A. Right thumb tissue:          Skin and subcutaneous tissue with abscess and acute           osteomyelitis of underlying bone.                                         Diagnosis performed by:                                       SHERRI GONZALEZ MD     CULTURE WOUND W/ GRAM STAIN  Order: [id number] - Reflex for Order [id number]  Status: Final result     Visible to patient: Yes (not seen)     Next appt: Today at 01:30 PM in Infectious Diseases (Timmy Liu A.P.R.N.)     Specimen Information: Wound   0 Result Notes      Component 1 mo ago   Significant Indicator POS Positive (POS)    Source WND    Site Right THUMB    Culture Result - Abnormal     Gram Stain Result Correct result: Rare Gram positive cocci.   Erroneous result: No organisms seen.    Culture Result  Abnormal   Staphylococcus aureus   Moderate growth   Methicillin sensitive by screening method     Culture Result  Abnormal   Staphylococcus epidermidis   Light growth     Resulting Agency M        Susceptibility     Staphylococcus aureus Staphylococcus epidermidis     JORDON JORDON     Ampicillin/sulbactam <=8/4 mcg/mL Sensitive <=8/4 mcg/mL Sensitive     Azithromycin >4 mcg/mL Resistant >4 mcg/mL Resistant     Cefazolin <=8 mcg/mL Sensitive <=8 mcg/mL  Sensitive     Cefepime <=4 mcg/mL Sensitive <=4 mcg/mL Sensitive     Ceftaroline <=0.5 mcg/mL Sensitive       Clindamycin 0.5 mcg/mL Sensitive <=0.25 mcg/mL Sensitive     Daptomycin 1 mcg/mL Sensitive <=0.5 mcg/mL Sensitive     Erythromycin >4 mcg/mL Resistant >4 mcg/mL Resistant     Oxacillin <=0.25 mcg/mL Sensitive <=0.25 mcg/mL Sensitive     Pip/Tazobactam <=8 mcg/mL Sensitive <=8 mcg/mL Sensitive     Tetracycline <=4 mcg/mL Sensitive >8 mcg/mL Resistant     Trimeth/Sulfa <=0.5/9.5 m... Sensitive <=0.5/9.5 m... Sensitive     Vancomycin 1 mcg/mL Sensitive 1 mcg/mL Sensitive                   Narrative  Performed by: M  A Finger tissue   rilatrell thumb tissue   Surgery - swabs received      Specimen Collected: 06/12/23  6:14 PM Last Resulted: 06/16/23  4:04 PM             Impression/Assessment      1. Uncontrolled type 2 diabetes mellitus with hyperglycemia (HCC)  CBC WITH DIFFERENTIAL    Comp Metabolic Panel      2. Subacute osteomyelitis of right hand (HCC)        3. MSSA (methicillin susceptible Staphylococcus aureus) infection          Christoph Taylor is a 58-year-old male with history of uncontrolled Type 2 diabetes presented on 6/8/23 with abdominal pain and right finger pain and swelling.  Patient was initially admitted to the medical floor however was transferred to the ICU secondary to syncope and the development of DKA. He continued to have significant pain and erythema at the right thumb and MRI showed concern of osteomyelitis therefore orthopedic surgery was consulted and he was taken to the OR 6/12 for debridement as well as bone biopsy. Right thumb I&D down to bone of left thumb. Pathology with abscess and acute  osteomyelitis of underlying bone. OR Culture + MSSA + Staphylococcus epidermidis ( Oxacillin sensitive).  Patient was discharged on 6-week course of IV Ancef, stop date 7/25/2023 08/01/23- Tolerated IV Ancef with no complaints of side effects, completed on 7/25/2023. Recommend close  follow-up with PCP for DM management as patient is at high risk for recurrence of infection and additional damage due to uncontrolled diabetes.  Patient is at high risk for amputations and readmission to hospital    PLAN:   -No other further need for antibiotics at this time as patient has completed a 6-week course of Ancef for osteomyelitis.  He is at risk for reoccurrence of infection due to uncontrolled diabetes.  Recommend monitoring of the right thumb site for recurrence of infection  - Repeat Labs CBC/CMP today for continuous monitoring as unable to obtain outside labs.  Most recent labs from hospital admission from 6/15/2023, WBC WNL 5.6, stable but slowly trending down anemia 10.0/30.6, CMP with elevated glucose 133, and hypocalcemia 7.7 GFR and creatinine WNL. A1C from 3/12/23- 15.2   - Recommend routine follow up with orthopedic surgery  -Recommended to check blood sugars 2-3 times daily for close monitoring.  Keep blood sugars below 140 to allow for adequate wound healing and prevent secondary infections.  Patient at high risk for recurrence of infection due to uncontrolled diabetes.  Last A1c from 3/12/2020 315.2 and trending up  - Education provided on S/S of infection and when to report to ER/ call [phone]       Return visit: PRN. Follow up with primary care physician for chronic medical problems      I have performed a physical exam,  updated ROS and plan today. I have reviewed previous images, labs, and provider notes.      YESY Melara.    All Patients should seek medical re-evaluation or report to the ER for new, increasing or worsening symptoms. In some circumstances medical conditions can change from the initial evaluation and may require emergent medical re-evaluation. This includes but is not limited to chest pain, shortness of breath, atypical abdominal pain, atypical headache, ALOC, fever >101, low blood pressure, high respiratory rate (above 30), low oxygen saturation (below 90%),  acute delirium, abnormal bleeding, inability to tolerate any intake, weakness on one side of the body, any worsened or concerning conditions.    Please note that this dictation was created using voice recognition software. I have worked with technical experts from [facility] to optimize the interface.  I have made every reasonable attempt to correct obvious errors, but there may be errors of grammar and possibly content that I did not discover before finalizing the note.    no

## 2023-08-02 ENCOUNTER — TELEPHONE (OUTPATIENT)
Dept: INFECTIOUS DISEASES | Facility: MEDICAL CENTER | Age: 58
End: 2023-08-02
Payer: COMMERCIAL

## 2023-08-02 NOTE — TELEPHONE ENCOUNTER
late entry from 8/1/23 19:48     Patient called and discussed updated    Recommended close follow up with PCP for DM management: Pt called and left voice mail to schedule appointment   Recommended to check BS 3-4 daily as he is a high risk for DKA   He denies any fever, chills, N/V/D, shakes, light headedness, SOB  ER precautions dicussed and educated to pt

## 2023-08-02 NOTE — TELEPHONE ENCOUNTER
Critical lab result of glucose 506. Called and dicussed with patient via telephone. Patient states recently giving himself 7 units glargine. He had confusion about which insulin was short vs long. Education provided about Glargaine and Lispro. Recommended to give an 14 units of Lispro and check Blood sugar again in 20 minutes. Patient to call back with  BS reading. ER precautions discussed with patient

## 2023-08-03 ENCOUNTER — APPOINTMENT (OUTPATIENT)
Dept: RADIOLOGY | Facility: MEDICAL CENTER | Age: 58
End: 2023-08-03
Attending: EMERGENCY MEDICINE
Payer: COMMERCIAL

## 2023-08-03 ENCOUNTER — HOSPITAL ENCOUNTER (OUTPATIENT)
Facility: MEDICAL CENTER | Age: 58
End: 2023-08-06
Attending: EMERGENCY MEDICINE | Admitting: HOSPITALIST
Payer: COMMERCIAL

## 2023-08-03 DIAGNOSIS — S79.911A INJURY OF RIGHT HIP, INITIAL ENCOUNTER: ICD-10-CM

## 2023-08-03 DIAGNOSIS — R73.9 HYPERGLYCEMIA: ICD-10-CM

## 2023-08-03 DIAGNOSIS — R74.8 ELEVATED ALKALINE PHOSPHATASE LEVEL: ICD-10-CM
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
## 2023-08-03 DIAGNOSIS — E86.0 DEHYDRATION: ICD-10-CM

## 2023-08-03 DIAGNOSIS — R10.12 LEFT UPPER QUADRANT ABDOMINAL PAIN: ICD-10-CM

## 2023-08-03 PROBLEM — Z86.19 HX OF CLOSTRIDIUM DIFFICILE INFECTION: Status: ACTIVE | Noted: 2023-08-03

## 2023-08-03 PROBLEM — M25.551 RIGHT HIP PAIN: Status: ACTIVE | Noted: 2023-08-03

## 2023-08-03 LAB
ALBUMIN SERPL BCP-MCNC: 3.9 G/DL (ref 3.2–4.9)
ALBUMIN/GLOB SERPL: 1.4 G/DL
ALP SERPL-CCNC: 476 U/L (ref 30–99)
ALT SERPL-CCNC: 78 U/L (ref 2–50)
ANION GAP SERPL CALC-SCNC: 19 MMOL/L (ref 7–16)
ANION GAP SERPL CALC-SCNC: 24 MMOL/L (ref 7–16)
APPEARANCE UR: CLEAR
AST SERPL-CCNC: 13 U/L (ref 12–45)
B-OH-BUTYR SERPL-MCNC: >8 MMOL/L (ref 0.02–0.27)
BACTERIA #/AREA URNS HPF: NEGATIVE /HPF
BASOPHILS # BLD AUTO: 0.1 % (ref 0–1.8)
BASOPHILS # BLD: 0.01 K/UL (ref 0–0.12)
BILIRUB SERPL-MCNC: 0.8 MG/DL (ref 0.1–1.5)
BILIRUB UR QL STRIP.AUTO: NEGATIVE
BUN SERPL-MCNC: 38 MG/DL (ref 8–22)
BUN SERPL-MCNC: 42 MG/DL (ref 8–22)
CALCIUM ALBUM COR SERPL-MCNC: 9 MG/DL (ref 8.5–10.5)
CALCIUM SERPL-MCNC: 8.2 MG/DL (ref 8.4–10.2)
CALCIUM SERPL-MCNC: 8.9 MG/DL (ref 8.4–10.2)
CHLORIDE SERPL-SCNC: 88 MMOL/L (ref 96–112)
CHLORIDE SERPL-SCNC: 95 MMOL/L (ref 96–112)
CO2 SERPL-SCNC: 24 MMOL/L (ref 20–33)
CO2 SERPL-SCNC: 24 MMOL/L (ref 20–33)
COLOR UR: YELLOW
CREAT SERPL-MCNC: 0.94 MG/DL (ref 0.5–1.4)
CREAT SERPL-MCNC: 1.05 MG/DL (ref 0.5–1.4)
EOSINOPHIL # BLD AUTO: 0.02 K/UL (ref 0–0.51)
EOSINOPHIL NFR BLD: 0.2 % (ref 0–6.9)
EPI CELLS #/AREA URNS HPF: NEGATIVE /HPF
ERYTHROCYTE [DISTWIDTH] IN BLOOD BY AUTOMATED COUNT: 40.3 FL (ref 35.9–50)
GFR SERPLBLD CREATININE-BSD FMLA CKD-EPI: 82 ML/MIN/1.73 M 2
GFR SERPLBLD CREATININE-BSD FMLA CKD-EPI: 93 ML/MIN/1.73 M 2
GLOBULIN SER CALC-MCNC: 2.8 G/DL (ref 1.9–3.5)
GLUCOSE BLD STRIP.AUTO-MCNC: 382 MG/DL (ref 65–99)
GLUCOSE BLD STRIP.AUTO-MCNC: 419 MG/DL (ref 65–99)
GLUCOSE BLD STRIP.AUTO-MCNC: 436 MG/DL (ref 65–99)
GLUCOSE BLD STRIP.AUTO-MCNC: 460 MG/DL (ref 65–99)
GLUCOSE SERPL-MCNC: 467 MG/DL (ref 65–99)
GLUCOSE SERPL-MCNC: 584 MG/DL (ref 65–99)
GLUCOSE UR STRIP.AUTO-MCNC: >=1000 MG/DL
HCT VFR BLD AUTO: 46.3 % (ref 42–52)
HGB BLD-MCNC: 14.9 G/DL (ref 14–18)
HYALINE CASTS #/AREA URNS LPF: ABNORMAL /LPF
IMM GRANULOCYTES # BLD AUTO: 0.05 K/UL (ref 0–0.11)
IMM GRANULOCYTES NFR BLD AUTO: 0.5 % (ref 0–0.9)
KETONES UR STRIP.AUTO-MCNC: 40 MG/DL
LACTATE SERPL-SCNC: 2.9 MMOL/L (ref 0.5–2)
LEUKOCYTE ESTERASE UR QL STRIP.AUTO: NEGATIVE
LIPASE SERPL-CCNC: 9 U/L (ref 11–82)
LYMPHOCYTES # BLD AUTO: 0.67 K/UL (ref 1–4.8)
LYMPHOCYTES NFR BLD: 6.4 % (ref 22–41)
MCH RBC QN AUTO: 27.2 PG (ref 27–33)
MCHC RBC AUTO-ENTMCNC: 32.2 G/DL (ref 32.3–36.5)
MCV RBC AUTO: 84.6 FL (ref 81.4–97.8)
MICRO URNS: ABNORMAL
MONOCYTES # BLD AUTO: 0.53 K/UL (ref 0–0.85)
MONOCYTES NFR BLD AUTO: 5.1 % (ref 0–13.4)
MUCOUS THREADS #/AREA URNS HPF: ABNORMAL /HPF
NEUTROPHILS # BLD AUTO: 9.2 K/UL (ref 1.82–7.42)
NEUTROPHILS NFR BLD: 87.7 % (ref 44–72)
NITRITE UR QL STRIP.AUTO: NEGATIVE
NRBC # BLD AUTO: 0 K/UL
NRBC BLD-RTO: 0 /100 WBC (ref 0–0.2)
PH UR STRIP.AUTO: 5 [PH] (ref 5–8)
PLATELET # BLD AUTO: 265 K/UL (ref 164–446)
PMV BLD AUTO: 9.5 FL (ref 9–12.9)
POTASSIUM SERPL-SCNC: 4.1 MMOL/L (ref 3.6–5.5)
POTASSIUM SERPL-SCNC: 4.6 MMOL/L (ref 3.6–5.5)
PROT SERPL-MCNC: 6.7 G/DL (ref 6–8.2)
PROT UR QL STRIP: 30 MG/DL
RBC # BLD AUTO: 5.47 M/UL (ref 4.7–6.1)
RBC # URNS HPF: ABNORMAL /HPF
RBC UR QL AUTO: ABNORMAL
SODIUM SERPL-SCNC: 136 MMOL/L (ref 135–145)
SODIUM SERPL-SCNC: 138 MMOL/L (ref 135–145)
SP GR UR STRIP.AUTO: 1.02
WBC # BLD AUTO: 10.5 K/UL (ref 4.8–10.8)
WBC #/AREA URNS HPF: ABNORMAL /HPF

## 2023-08-03 PROCEDURE — 700105 HCHG RX REV CODE 258: Mod: JZ | Performed by: HOSPITALIST

## 2023-08-03 PROCEDURE — 80053 COMPREHEN METABOLIC PANEL: CPT

## 2023-08-03 PROCEDURE — 770001 HCHG ROOM/CARE - MED/SURG/GYN PRIV*

## 2023-08-03 PROCEDURE — 94760 N-INVAS EAR/PLS OXIMETRY 1: CPT

## 2023-08-03 PROCEDURE — 80048 BASIC METABOLIC PNL TOTAL CA: CPT

## 2023-08-03 PROCEDURE — 700111 HCHG RX REV CODE 636 W/ 250 OVERRIDE (IP): Performed by: EMERGENCY MEDICINE

## 2023-08-03 PROCEDURE — 81001 URINALYSIS AUTO W/SCOPE: CPT

## 2023-08-03 PROCEDURE — 83690 ASSAY OF LIPASE: CPT

## 2023-08-03 PROCEDURE — A9270 NON-COVERED ITEM OR SERVICE: HCPCS | Performed by: HOSPITALIST

## 2023-08-03 PROCEDURE — 700102 HCHG RX REV CODE 250 W/ 637 OVERRIDE(OP): Performed by: EMERGENCY MEDICINE

## 2023-08-03 PROCEDURE — 82962 GLUCOSE BLOOD TEST: CPT

## 2023-08-03 PROCEDURE — 82010 KETONE BODYS QUAN: CPT

## 2023-08-03 PROCEDURE — 99223 1ST HOSP IP/OBS HIGH 75: CPT | Performed by: HOSPITALIST

## 2023-08-03 PROCEDURE — 76705 ECHO EXAM OF ABDOMEN: CPT

## 2023-08-03 PROCEDURE — 96375 TX/PRO/DX INJ NEW DRUG ADDON: CPT

## 2023-08-03 PROCEDURE — 99285 EMERGENCY DEPT VISIT HI MDM: CPT

## 2023-08-03 PROCEDURE — A9270 NON-COVERED ITEM OR SERVICE: HCPCS | Performed by: EMERGENCY MEDICINE

## 2023-08-03 PROCEDURE — 700102 HCHG RX REV CODE 250 W/ 637 OVERRIDE(OP): Performed by: HOSPITALIST

## 2023-08-03 PROCEDURE — 83605 ASSAY OF LACTIC ACID: CPT

## 2023-08-03 PROCEDURE — 85025 COMPLETE CBC W/AUTO DIFF WBC: CPT

## 2023-08-03 PROCEDURE — 700111 HCHG RX REV CODE 636 W/ 250 OVERRIDE (IP): Mod: JZ | Performed by: HOSPITALIST

## 2023-08-03 PROCEDURE — G0378 HOSPITAL OBSERVATION PER HR: HCPCS

## 2023-08-03 PROCEDURE — 73502 X-RAY EXAM HIP UNI 2-3 VIEWS: CPT | Mod: RT

## 2023-08-03 PROCEDURE — 36415 COLL VENOUS BLD VENIPUNCTURE: CPT

## 2023-08-03 PROCEDURE — 700105 HCHG RX REV CODE 258: Performed by: EMERGENCY MEDICINE

## 2023-08-03 PROCEDURE — 96374 THER/PROPH/DIAG INJ IV PUSH: CPT

## 2023-08-03 RX ORDER — DEXTROSE MONOHYDRATE 25 G/50ML
25 INJECTION, SOLUTION INTRAVENOUS
Status: DISCONTINUED | OUTPATIENT
Start: 2023-08-03 | End: 2023-08-06 | Stop reason: HOSPADM

## 2023-08-03 RX ORDER — SODIUM CHLORIDE 9 MG/ML
2000 INJECTION, SOLUTION INTRAVENOUS ONCE
Status: COMPLETED | OUTPATIENT
Start: 2023-08-03 | End: 2023-08-03

## 2023-08-03 RX ORDER — ACETAMINOPHEN 325 MG/1
650 TABLET ORAL EVERY 6 HOURS PRN
Status: DISCONTINUED | OUTPATIENT
Start: 2023-08-03 | End: 2023-08-06 | Stop reason: HOSPADM

## 2023-08-03 RX ORDER — SODIUM CHLORIDE 9 MG/ML
INJECTION, SOLUTION INTRAVENOUS CONTINUOUS
Status: DISCONTINUED | OUTPATIENT
Start: 2023-08-04 | End: 2023-08-05

## 2023-08-03 RX ORDER — SODIUM CHLORIDE, SODIUM LACTATE, POTASSIUM CHLORIDE, AND CALCIUM CHLORIDE .6; .31; .03; .02 G/100ML; G/100ML; G/100ML; G/100ML
2000 INJECTION, SOLUTION INTRAVENOUS ONCE
Status: COMPLETED | OUTPATIENT
Start: 2023-08-03 | End: 2023-08-03

## 2023-08-03 RX ORDER — SODIUM CHLORIDE 9 MG/ML
INJECTION, SOLUTION INTRAVENOUS CONTINUOUS
Status: DISCONTINUED | OUTPATIENT
Start: 2023-08-03 | End: 2023-08-03

## 2023-08-03 RX ORDER — PROMETHAZINE HYDROCHLORIDE 25 MG/1
12.5-25 SUPPOSITORY RECTAL EVERY 4 HOURS PRN
Status: DISCONTINUED | OUTPATIENT
Start: 2023-08-03 | End: 2023-08-06 | Stop reason: HOSPADM

## 2023-08-03 RX ORDER — OXYCODONE HYDROCHLORIDE 5 MG/1
5 TABLET ORAL
Status: DISCONTINUED | OUTPATIENT
Start: 2023-08-03 | End: 2023-08-06 | Stop reason: HOSPADM

## 2023-08-03 RX ORDER — ONDANSETRON 4 MG/1
4 TABLET, ORALLY DISINTEGRATING ORAL EVERY 4 HOURS PRN
Status: DISCONTINUED | OUTPATIENT
Start: 2023-08-03 | End: 2023-08-06 | Stop reason: HOSPADM

## 2023-08-03 RX ORDER — OXYCODONE HYDROCHLORIDE 10 MG/1
10 TABLET ORAL
Status: DISCONTINUED | OUTPATIENT
Start: 2023-08-03 | End: 2023-08-04

## 2023-08-03 RX ORDER — ONDANSETRON 2 MG/ML
4 INJECTION INTRAMUSCULAR; INTRAVENOUS EVERY 4 HOURS PRN
Status: DISCONTINUED | OUTPATIENT
Start: 2023-08-03 | End: 2023-08-06 | Stop reason: HOSPADM

## 2023-08-03 RX ORDER — AMOXICILLIN 250 MG
2 CAPSULE ORAL 2 TIMES DAILY
Status: DISCONTINUED | OUTPATIENT
Start: 2023-08-03 | End: 2023-08-06 | Stop reason: HOSPADM

## 2023-08-03 RX ORDER — BISACODYL 10 MG
10 SUPPOSITORY, RECTAL RECTAL
Status: DISCONTINUED | OUTPATIENT
Start: 2023-08-03 | End: 2023-08-06 | Stop reason: HOSPADM

## 2023-08-03 RX ORDER — PROCHLORPERAZINE EDISYLATE 5 MG/ML
5-10 INJECTION INTRAMUSCULAR; INTRAVENOUS EVERY 4 HOURS PRN
Status: DISCONTINUED | OUTPATIENT
Start: 2023-08-03 | End: 2023-08-06 | Stop reason: HOSPADM

## 2023-08-03 RX ORDER — PROMETHAZINE HYDROCHLORIDE 25 MG/1
12.5-25 TABLET ORAL EVERY 4 HOURS PRN
Status: DISCONTINUED | OUTPATIENT
Start: 2023-08-03 | End: 2023-08-06 | Stop reason: HOSPADM

## 2023-08-03 RX ORDER — POLYETHYLENE GLYCOL 3350 17 G/17G
1 POWDER, FOR SOLUTION ORAL
Status: DISCONTINUED | OUTPATIENT
Start: 2023-08-03 | End: 2023-08-06 | Stop reason: HOSPADM

## 2023-08-03 RX ORDER — SODIUM CHLORIDE, SODIUM LACTATE, POTASSIUM CHLORIDE, CALCIUM CHLORIDE 600; 310; 30; 20 MG/100ML; MG/100ML; MG/100ML; MG/100ML
2000 INJECTION, SOLUTION INTRAVENOUS CONTINUOUS
Status: ACTIVE | OUTPATIENT
Start: 2023-08-03 | End: 2023-08-04

## 2023-08-03 RX ORDER — MORPHINE SULFATE 4 MG/ML
4 INJECTION INTRAVENOUS ONCE
Status: COMPLETED | OUTPATIENT
Start: 2023-08-03 | End: 2023-08-03

## 2023-08-03 RX ORDER — SODIUM CHLORIDE, SODIUM LACTATE, POTASSIUM CHLORIDE, CALCIUM CHLORIDE 600; 310; 30; 20 MG/100ML; MG/100ML; MG/100ML; MG/100ML
2000 INJECTION, SOLUTION INTRAVENOUS CONTINUOUS
Status: DISCONTINUED | OUTPATIENT
Start: 2023-08-03 | End: 2023-08-03

## 2023-08-03 RX ORDER — HYDROMORPHONE HYDROCHLORIDE 1 MG/ML
0.5 INJECTION, SOLUTION INTRAMUSCULAR; INTRAVENOUS; SUBCUTANEOUS
Status: DISCONTINUED | OUTPATIENT
Start: 2023-08-03 | End: 2023-08-04

## 2023-08-03 RX ORDER — ONDANSETRON 2 MG/ML
4 INJECTION INTRAMUSCULAR; INTRAVENOUS ONCE
Status: COMPLETED | OUTPATIENT
Start: 2023-08-03 | End: 2023-08-03

## 2023-08-03 RX ADMIN — LIDOCAINE HYDROCHLORIDE 30 ML: 20 SOLUTION OROPHARYNGEAL at 17:31

## 2023-08-03 RX ADMIN — INSULIN HUMAN 10 UNITS: 100 INJECTION, SOLUTION PARENTERAL at 17:28

## 2023-08-03 RX ADMIN — MORPHINE SULFATE 4 MG: 4 INJECTION INTRAVENOUS at 17:31

## 2023-08-03 RX ADMIN — SODIUM CHLORIDE: 9 INJECTION, SOLUTION INTRAVENOUS at 19:15

## 2023-08-03 RX ADMIN — ONDANSETRON 4 MG: 2 INJECTION INTRAMUSCULAR; INTRAVENOUS at 17:31

## 2023-08-03 RX ADMIN — SODIUM CHLORIDE, POTASSIUM CHLORIDE, SODIUM LACTATE AND CALCIUM CHLORIDE 2000 ML: 600; 310; 30; 20 INJECTION, SOLUTION INTRAVENOUS at 23:54

## 2023-08-03 RX ADMIN — RIVAROXABAN 10 MG: 10 TABLET, FILM COATED ORAL at 20:19

## 2023-08-03 RX ADMIN — ONDANSETRON 4 MG: 2 INJECTION INTRAMUSCULAR; INTRAVENOUS at 21:47

## 2023-08-03 RX ADMIN — SODIUM CHLORIDE 2000 ML: 9 INJECTION, SOLUTION INTRAVENOUS at 17:31

## 2023-08-03 RX ADMIN — INSULIN GLARGINE-YFGN 20 UNITS: 100 INJECTION, SOLUTION SUBCUTANEOUS at 20:25

## 2023-08-03 RX ADMIN — SODIUM CHLORIDE, POTASSIUM CHLORIDE, SODIUM LACTATE AND CALCIUM CHLORIDE 2000 ML: 600; 310; 30; 20 INJECTION, SOLUTION INTRAVENOUS at 21:54

## 2023-08-03 RX ADMIN — VANCOMYCIN HYDROCHLORIDE 125 MG: 500 INJECTION, POWDER, LYOPHILIZED, FOR SOLUTION INTRAVENOUS at 23:53

## 2023-08-03 ASSESSMENT — ENCOUNTER SYMPTOMS
STRIDOR: 0
FOCAL WEAKNESS: 0
ABDOMINAL PAIN: 1
POLYDIPSIA: 1
FALLS: 1
MYALGIAS: 0
VOMITING: 0
EYE REDNESS: 0
BRUISES/BLEEDS EASILY: 0
NAUSEA: 1
EYE DISCHARGE: 0
SHORTNESS OF BREATH: 0
COUGH: 0
CHILLS: 0
FEVER: 0
NERVOUS/ANXIOUS: 0
FLANK PAIN: 0

## 2023-08-03 ASSESSMENT — LIFESTYLE VARIABLES: DO YOU DRINK ALCOHOL: NO

## 2023-08-03 ASSESSMENT — PAIN DESCRIPTION - PAIN TYPE: TYPE: CHRONIC PAIN;ACUTE PAIN

## 2023-08-03 ASSESSMENT — FIBROSIS 4 INDEX: FIB4 SCORE: 0.39

## 2023-08-03 NOTE — ED TRIAGE NOTES
Pt GINNA Garcia from home  Chief Complaint   Patient presents with    Abdominal Pain     LUQ pain x months worse the last day     GLF     1 week ago, broke toilet tank, R hip pain since fall    Urinary Frequency     Pt c/o thirst and urinary frequency hx of diabetes      Pt reports he was just in [facility] for 5 weeks to treat a infection in his R thumb    Pt reports he is taking Vanco for C diff, last BM yesterday, pt reports it was a formed brown stool.    Pt resting on gurney, pt in no acute distress, pt provided call light, instructed to call if needing any assistance, instructed not to get up by self, gurney in lowest position.

## 2023-08-04 LAB
ANION GAP SERPL CALC-SCNC: 7 MMOL/L (ref 7–16)
ANION GAP SERPL CALC-SCNC: 9 MMOL/L (ref 7–16)
BUN SERPL-MCNC: 27 MG/DL (ref 8–22)
BUN SERPL-MCNC: 32 MG/DL (ref 8–22)
CALCIUM SERPL-MCNC: 8.2 MG/DL (ref 8.4–10.2)
CALCIUM SERPL-MCNC: 8.4 MG/DL (ref 8.4–10.2)
CHLORIDE SERPL-SCNC: 103 MMOL/L (ref 96–112)
CHLORIDE SERPL-SCNC: 98 MMOL/L (ref 96–112)
CO2 SERPL-SCNC: 29 MMOL/L (ref 20–33)
CO2 SERPL-SCNC: 32 MMOL/L (ref 20–33)
CREAT SERPL-MCNC: 0.63 MG/DL (ref 0.5–1.4)
CREAT SERPL-MCNC: 0.8 MG/DL (ref 0.5–1.4)
ERYTHROCYTE [DISTWIDTH] IN BLOOD BY AUTOMATED COUNT: 40.5 FL (ref 35.9–50)
GFR SERPLBLD CREATININE-BSD FMLA CKD-EPI: 102 ML/MIN/1.73 M 2
GFR SERPLBLD CREATININE-BSD FMLA CKD-EPI: 109 ML/MIN/1.73 M 2
GLUCOSE BLD STRIP.AUTO-MCNC: 110 MG/DL (ref 65–99)
GLUCOSE BLD STRIP.AUTO-MCNC: 119 MG/DL (ref 65–99)
GLUCOSE BLD STRIP.AUTO-MCNC: 148 MG/DL (ref 65–99)
GLUCOSE BLD STRIP.AUTO-MCNC: 212 MG/DL (ref 65–99)
GLUCOSE SERPL-MCNC: 186 MG/DL (ref 65–99)
GLUCOSE SERPL-MCNC: 219 MG/DL (ref 65–99)
HCT VFR BLD AUTO: 40.3 % (ref 42–52)
HGB BLD-MCNC: 13.1 G/DL (ref 14–18)
LACTATE SERPL-SCNC: 0.8 MMOL/L (ref 0.5–2)
LACTATE SERPL-SCNC: 1 MMOL/L (ref 0.5–2)
LACTATE SERPL-SCNC: 2 MMOL/L (ref 0.5–2)
MAGNESIUM SERPL-MCNC: 1.8 MG/DL (ref 1.5–2.5)
MCH RBC QN AUTO: 27.2 PG (ref 27–33)
MCHC RBC AUTO-ENTMCNC: 32.5 G/DL (ref 32.3–36.5)
MCV RBC AUTO: 83.6 FL (ref 81.4–97.8)
PLATELET # BLD AUTO: 230 K/UL (ref 164–446)
PMV BLD AUTO: 9.3 FL (ref 9–12.9)
POTASSIUM SERPL-SCNC: 3.8 MMOL/L (ref 3.6–5.5)
POTASSIUM SERPL-SCNC: 3.9 MMOL/L (ref 3.6–5.5)
PROCALCITONIN SERPL-MCNC: 0.08 NG/ML
RBC # BLD AUTO: 4.82 M/UL (ref 4.7–6.1)
SODIUM SERPL-SCNC: 139 MMOL/L (ref 135–145)
SODIUM SERPL-SCNC: 139 MMOL/L (ref 135–145)
WBC # BLD AUTO: 12.2 K/UL (ref 4.8–10.8)

## 2023-08-04 PROCEDURE — 82962 GLUCOSE BLOOD TEST: CPT

## 2023-08-04 PROCEDURE — 84145 PROCALCITONIN (PCT): CPT

## 2023-08-04 PROCEDURE — 85027 COMPLETE CBC AUTOMATED: CPT

## 2023-08-04 PROCEDURE — 36415 COLL VENOUS BLD VENIPUNCTURE: CPT

## 2023-08-04 PROCEDURE — 700105 HCHG RX REV CODE 258: Performed by: HOSPITALIST

## 2023-08-04 PROCEDURE — 700102 HCHG RX REV CODE 250 W/ 637 OVERRIDE(OP): Performed by: INTERNAL MEDICINE

## 2023-08-04 PROCEDURE — 51798 US URINE CAPACITY MEASURE: CPT

## 2023-08-04 PROCEDURE — 97535 SELF CARE MNGMENT TRAINING: CPT

## 2023-08-04 PROCEDURE — 97166 OT EVAL MOD COMPLEX 45 MIN: CPT

## 2023-08-04 PROCEDURE — 80048 BASIC METABOLIC PNL TOTAL CA: CPT

## 2023-08-04 PROCEDURE — G0378 HOSPITAL OBSERVATION PER HR: HCPCS

## 2023-08-04 PROCEDURE — 700111 HCHG RX REV CODE 636 W/ 250 OVERRIDE (IP): Mod: JZ | Performed by: HOSPITALIST

## 2023-08-04 PROCEDURE — 99233 SBSQ HOSP IP/OBS HIGH 50: CPT | Performed by: INTERNAL MEDICINE

## 2023-08-04 PROCEDURE — 94760 N-INVAS EAR/PLS OXIMETRY 1: CPT

## 2023-08-04 PROCEDURE — 96375 TX/PRO/DX INJ NEW DRUG ADDON: CPT

## 2023-08-04 PROCEDURE — A9270 NON-COVERED ITEM OR SERVICE: HCPCS | Performed by: INTERNAL MEDICINE

## 2023-08-04 PROCEDURE — A9270 NON-COVERED ITEM OR SERVICE: HCPCS | Performed by: HOSPITALIST

## 2023-08-04 PROCEDURE — 700102 HCHG RX REV CODE 250 W/ 637 OVERRIDE(OP): Performed by: HOSPITALIST

## 2023-08-04 PROCEDURE — 83605 ASSAY OF LACTIC ACID: CPT

## 2023-08-04 PROCEDURE — 83735 ASSAY OF MAGNESIUM: CPT

## 2023-08-04 RX ORDER — FAMOTIDINE 20 MG/1
20 TABLET, FILM COATED ORAL 2 TIMES DAILY
Status: DISCONTINUED | OUTPATIENT
Start: 2023-08-04 | End: 2023-08-06 | Stop reason: HOSPADM

## 2023-08-04 RX ADMIN — LIDOCAINE HYDROCHLORIDE 30 ML: 20 SOLUTION OROPHARYNGEAL at 18:42

## 2023-08-04 RX ADMIN — OXYCODONE HYDROCHLORIDE 5 MG: 5 TABLET ORAL at 21:06

## 2023-08-04 RX ADMIN — SODIUM CHLORIDE: 9 INJECTION, SOLUTION INTRAVENOUS at 16:03

## 2023-08-04 RX ADMIN — ONDANSETRON 4 MG: 2 INJECTION INTRAMUSCULAR; INTRAVENOUS at 05:25

## 2023-08-04 RX ADMIN — OXYCODONE HYDROCHLORIDE 10 MG: 10 TABLET ORAL at 09:24

## 2023-08-04 RX ADMIN — FAMOTIDINE 20 MG: 20 TABLET ORAL at 17:39

## 2023-08-04 RX ADMIN — VANCOMYCIN HYDROCHLORIDE 125 MG: 500 INJECTION, POWDER, LYOPHILIZED, FOR SOLUTION INTRAVENOUS at 05:24

## 2023-08-04 RX ADMIN — SODIUM CHLORIDE: 9 INJECTION, SOLUTION INTRAVENOUS at 05:32

## 2023-08-04 RX ADMIN — RIVAROXABAN 10 MG: 10 TABLET, FILM COATED ORAL at 17:39

## 2023-08-04 RX ADMIN — INSULIN GLARGINE-YFGN 20 UNITS: 100 INJECTION, SOLUTION SUBCUTANEOUS at 18:41

## 2023-08-04 RX ADMIN — OXYCODONE HYDROCHLORIDE 5 MG: 5 TABLET ORAL at 05:25

## 2023-08-04 RX ADMIN — INSULIN GLARGINE-YFGN 20 UNITS: 100 INJECTION, SOLUTION SUBCUTANEOUS at 05:39

## 2023-08-04 RX ADMIN — HYDROMORPHONE HYDROCHLORIDE 0.5 MG: 1 INJECTION, SOLUTION INTRAMUSCULAR; INTRAVENOUS; SUBCUTANEOUS at 12:00

## 2023-08-04 RX ADMIN — VANCOMYCIN HYDROCHLORIDE 125 MG: 500 INJECTION, POWDER, LYOPHILIZED, FOR SOLUTION INTRAVENOUS at 11:11

## 2023-08-04 ASSESSMENT — ENCOUNTER SYMPTOMS
CHILLS: 0
FOCAL WEAKNESS: 0
FEVER: 0
SHORTNESS OF BREATH: 0
HALLUCINATIONS: 0
PALPITATIONS: 0
ABDOMINAL PAIN: 1
DIZZINESS: 0
SORE THROAT: 0
DIARRHEA: 0
COUGH: 0
DEPRESSION: 0
HEADACHES: 0
HEARTBURN: 0
WEAKNESS: 1
VOMITING: 0
MYALGIAS: 0
BLOOD IN STOOL: 0
FALLS: 1
BACK PAIN: 0
NAUSEA: 0

## 2023-08-04 ASSESSMENT — COGNITIVE AND FUNCTIONAL STATUS - GENERAL
WALKING IN HOSPITAL ROOM: A LOT
STANDING UP FROM CHAIR USING ARMS: A LITTLE
HELP NEEDED FOR BATHING: A LITTLE
MOBILITY SCORE: 16
SUGGESTED CMS G CODE MODIFIER DAILY ACTIVITY: CK
DRESSING REGULAR LOWER BODY CLOTHING: A LOT
SUGGESTED CMS G CODE MODIFIER DAILY ACTIVITY: CJ
DRESSING REGULAR LOWER BODY CLOTHING: A LITTLE
SUGGESTED CMS G CODE MODIFIER MOBILITY: CK
DAILY ACTIVITIY SCORE: 21
CLIMB 3 TO 5 STEPS WITH RAILING: TOTAL
HELP NEEDED FOR BATHING: A LITTLE
TOILETING: A LOT
MOVING FROM LYING ON BACK TO SITTING ON SIDE OF FLAT BED: A LOT
DAILY ACTIVITIY SCORE: 19
TOILETING: A LITTLE

## 2023-08-04 ASSESSMENT — PAIN DESCRIPTION - PAIN TYPE
TYPE: ACUTE PAIN

## 2023-08-04 ASSESSMENT — LIFESTYLE VARIABLES
AVERAGE NUMBER OF DAYS PER WEEK YOU HAVE A DRINK CONTAINING ALCOHOL: 1
HAVE PEOPLE ANNOYED YOU BY CRITICIZING YOUR DRINKING: NO
TOTAL SCORE: 0
HOW MANY TIMES IN THE PAST YEAR HAVE YOU HAD 5 OR MORE DRINKS IN A DAY: 0
ALCOHOL_USE: YES
HAVE YOU EVER FELT YOU SHOULD CUT DOWN ON YOUR DRINKING: NO
TOTAL SCORE: 0
EVER FELT BAD OR GUILTY ABOUT YOUR DRINKING: NO
CONSUMPTION TOTAL: NEGATIVE
EVER HAD A DRINK FIRST THING IN THE MORNING TO STEADY YOUR NERVES TO GET RID OF A HANGOVER: NO
TOTAL SCORE: 0
ON A TYPICAL DAY WHEN YOU DRINK ALCOHOL HOW MANY DRINKS DO YOU HAVE: 1

## 2023-08-04 ASSESSMENT — PATIENT HEALTH QUESTIONNAIRE - PHQ9
2. FEELING DOWN, DEPRESSED, IRRITABLE, OR HOPELESS: SEVERAL DAYS
SUM OF ALL RESPONSES TO PHQ9 QUESTIONS 1 AND 2: 4
7. TROUBLE CONCENTRATING ON THINGS, SUCH AS READING THE NEWSPAPER OR WATCHING TELEVISION: NEARLY EVERY DAY
3. TROUBLE FALLING OR STAYING ASLEEP OR SLEEPING TOO MUCH: NEARLY EVERY DAY
1. LITTLE INTEREST OR PLEASURE IN DOING THINGS: NEARLY EVERY DAY
8. MOVING OR SPEAKING SO SLOWLY THAT OTHER PEOPLE COULD HAVE NOTICED. OR THE OPPOSITE, BEING SO FIGETY OR RESTLESS THAT YOU HAVE BEEN MOVING AROUND A LOT MORE THAN USUAL: NEARLY EVERY DAY
SUM OF ALL RESPONSES TO PHQ QUESTIONS 1-9: 22
4. FEELING TIRED OR HAVING LITTLE ENERGY: NEARLY EVERY DAY
5. POOR APPETITE OR OVEREATING: NEARLY EVERY DAY
6. FEELING BAD ABOUT YOURSELF - OR THAT YOU ARE A FAILURE OR HAVE LET YOURSELF OR YOUR FAMILY DOWN: NEARLY EVERY DAY
9. THOUGHTS THAT YOU WOULD BE BETTER OFF DEAD, OR OF HURTING YOURSELF: NOT AT ALL

## 2023-08-04 ASSESSMENT — FIBROSIS 4 INDEX: FIB4 SCORE: 0.33

## 2023-08-04 ASSESSMENT — ACTIVITIES OF DAILY LIVING (ADL): TOILETING: INDEPENDENT

## 2023-08-04 NOTE — PROGRESS NOTES
Med rec complete per Fax from [facility] and Pt.   Floor RPH updated and given FAX.  Updated Vancomycin start date is 7/23/23 x 3 week course for C-DIFF per [facility] paperwork.

## 2023-08-04 NOTE — ASSESSMENT & PLAN NOTE
Constant left sided abdominal pain/point tenderness without associated nausea vomiting or changes in bowel habits.    He had an elevated AST and alk phos however right upper quadrant ultrasound was negative arguing against cholecystitis  Lipase was normal   States constant for 3 months, worse with eating  UA showed markedly elevated glucose however no evidence of infection however he may still have urinary retention  Bladder scan w 120, no e/o retention  Began treatment for PUD, continue GI cocktail and Pepcid   he plans to follow-up with GI as an outpatient-we discussed this in detail and I reinforced the importance of close follow-up appointment to complete his work-up  Avoid narcotics  Repeat labs in a.m.

## 2023-08-04 NOTE — PROGRESS NOTES
"20:15 Unable to complete med rec as pt \"Not sure\" of the dose he takes for his oral abx for CDIF. Per pt he will call his son first.   20:38 MD made aware pt BG at 436, insulin reglar and glargine given per mar. Informed MD pt is reporting stomach pain when swallowing and that pt reported  he is still on oral abx for CDIF. New orders placed.  20:46 Called lab to ffup stat orders for BMP. Spoke to [place].   21:31 New orders placed by Dr. Lauren for 8 units of Reglar, 2L of bolus LR. IVF LR Continuous, rate change to 125/hr. Orders placed via telephone with read back.   22:10 Dr Macdonald made aware during rounds regarding pts labs, new orders placed.  0000 RN at bedside for medpass. Pt reported PRN Zofran helped with nausea.  02:00 Pt asleep,  in place. Respirations unlabored.  04:00 Pt asleep, respirations unlabored.  05:20 RN at [facility] for med pass. PRN pain and nausea med given per mar. Accucheck done. Insulin given per sliding scale.  07:00 bedside report given to PENNIE Altamirano  "

## 2023-08-04 NOTE — PROGRESS NOTES
4 Eyes Skin Assessment Completed by PENNIE Morales and PENNIE Hidalgo.    Head WDL  Ears WDL  Nose WDL  Mouth Dry, lips cracked  Neck WDL  Breast/Chest WDL  Shoulder Blades WDL  Spine Scar   (R) Arm/Elbow/Hand Bruising  (L) Arm/Elbow/Hand Bruising  Abdomen WDL  Groin Redness, Blanching, and Rash  Scrotum/Coccyx/Buttocks Redness and Blanching  (R) Leg Scab and Abrasion knee and shin -scattered   (L) Leg scabs  (R) Heel/Foot/Toe swelling ankles  (L) Heel/Foot/Toe Swelling ankles          Devices In Places Pulse Ox and Nasal Cannula      Interventions In Place Gray Ear Foams, Pillows, and Barrier Cream    Possible Skin Injury No    Pictures Uploaded Into Epic N/A  Wound Consult Placed N/A  RN Wound Prevention Protocol Ordered Yes

## 2023-08-04 NOTE — ASSESSMENT & PLAN NOTE
With marked hyperglycemia  Last glycated hemoglobin was 15.2%!!  With hydration and insulin his sugars have improved to the 100s  He will need both long and short acting, I discussed this with him in detail  Continue Lantus units twice daily  Sliding scale  Hypoglycemia protocol  SAVITA COTTON

## 2023-08-04 NOTE — PROGRESS NOTES
Rounding     1955 Patient arrived from ED, Vitals   2204 Vitals check   0125 Patient sleeping   0306 Water requested   0509 Vitals check   0617 Patient sleeping

## 2023-08-04 NOTE — ED NOTES
Med rec partially complete per Pt at bedside.   I contacted [facility] for MAR when Pt discharged from there 7/26/2023. RN will fax as soon as possible to [facility].   Allergies reviewed  RN and ER RPH aware of MAR being faxed from other facility.

## 2023-08-04 NOTE — PROGRESS NOTES
Hospital Medicine Daily Progress Note    Date of Service  8/4/2023    Chief Complaint  Christoph Taylor is a 58 y.o. male admitted 8/3/2023 with abdominal pain    Hospital Course  58-year-old male with a history of poorly controlled type 2 diabetes, medical noncompliance, recent right thumb osteomyelitis status post 6 weeks of IV antibiotic treatment at SNF who was recently discharged home and had a fall 1 week ago.  Since then, he has had progressively worsening right hip pain and presented to the hospital with 24 hours of severe left-sided abdominal pain.  He was found to have marked hyperglycemia with glucose greater than 500 associated with metabolic acidosis concerning for early DKA.  He was hospitalized for IV fluids, glucose control and further work-up of his abdominal and hip pain.    Interval Problem Update  8/4: Glucose levels have significantly improved with insulin and fluids.  Pending right hip MRI.  Bladder scan pending.  P.o. vancomycin discontinued, completed antibiotic course for C. difficile.  PT OT pending    I have discussed this patient's plan of care and discharge plan at IDT rounds today with Case Management, Nursing, Nursing leadership, and other members of the IDT team.    Consultants/Specialty  None    Code Status  Full Code    Disposition  The patient is not medically cleared for discharge to home or a post-acute facility.  Anticipate discharge to: home with organized home healthcare and close outpatient follow-up    I have placed the appropriate orders for post-discharge needs.    Review of Systems  Review of Systems   Constitutional:  Positive for malaise/fatigue. Negative for chills and fever.   HENT:  Negative for sore throat.    Respiratory:  Negative for cough and shortness of breath.    Cardiovascular:  Negative for chest pain and palpitations.   Gastrointestinal:  Positive for abdominal pain. Negative for blood in stool, diarrhea, heartburn, nausea and vomiting.   Genitourinary:   Negative for dysuria and frequency.   Musculoskeletal:  Positive for falls and joint pain. Negative for back pain and myalgias.   Neurological:  Positive for weakness. Negative for dizziness, focal weakness and headaches.   Psychiatric/Behavioral:  Negative for depression and hallucinations.    All other systems reviewed and are negative.       Physical Exam  Temp:  [36.9 °C (98.4 °F)-37.3 °C (99.1 °F)] 37.3 °C (99.1 °F)  Pulse:  [79-89] 85  Resp:  [12-19] 18  BP: ()/(62-88) 136/88  SpO2:  [90 %-97 %] 96 %    Physical Exam  Constitutional:       Comments: Unkempt   HENT:      Head: Normocephalic and atraumatic.      Nose: Nose normal.      Mouth/Throat:      Mouth: Mucous membranes are moist.   Eyes:      Extraocular Movements: Extraocular movements intact.      Pupils: Pupils are equal, round, and reactive to light.   Cardiovascular:      Rate and Rhythm: Normal rate and regular rhythm.      Heart sounds: No murmur heard.  Pulmonary:      Effort: Pulmonary effort is normal. No respiratory distress.      Breath sounds: Normal breath sounds. No stridor. No wheezing.   Abdominal:      General: Abdomen is flat. Bowel sounds are normal. There is no distension.      Palpations: Abdomen is soft.      Tenderness: There is abdominal tenderness. There is no guarding.      Comments: Suprapubic firmness   Musculoskeletal:         General: No swelling, tenderness or deformity.      Cervical back: Normal range of motion and neck supple.   Skin:     General: Skin is warm and dry.      Coloration: Skin is not pale.   Neurological:      General: No focal deficit present.      Mental Status: He is alert and oriented to person, place, and time. Mental status is at baseline.   Psychiatric:         Mood and Affect: Mood normal.         Behavior: Behavior normal.         Thought Content: Thought content normal.         Fluids    Intake/Output Summary (Last 24 hours) at 8/4/2023 1232  Last data filed at 8/4/2023 1200  Gross per 24  hour   Intake 510 ml   Output 1400 ml   Net -890 ml       Laboratory  Recent Labs     08/01/23  1419 08/03/23  1628 08/04/23  0206   WBC 6.5 10.5 12.2*   RBC 4.89 5.47 4.82   HEMOGLOBIN 13.3* 14.9 13.1*   HEMATOCRIT 40.9* 46.3 40.3*   MCV 83.6 84.6 83.6   MCH 27.2 27.2 27.2   MCHC 32.5 32.2* 32.5   RDW 40.6 40.3 40.5   PLATELETCT 257 265 230   MPV 9.9 9.5 9.3     Recent Labs     08/03/23  2056 08/04/23  0206 08/04/23  0628   SODIUM 138 139 139   POTASSIUM 4.1 3.9 3.8   CHLORIDE 95* 98 103   CO2 24 32 29   GLUCOSE 467* 219* 186*   BUN 38* 32* 27*   CREATININE 0.94 0.80 0.63   CALCIUM 8.2* 8.4 8.2*                   Imaging  US-RUQ   Final Result      Normal right upper quadrant ultrasound.      DX-HIP-COMPLETE - UNILATERAL 2+ RIGHT   Final Result      No radiographic evidence of acute traumatic injury.      MR-HIP-W/O RIGHT    (Results Pending)        Assessment/Plan  * Hyperglycemia- (present on admission)  Assessment & Plan  On arrival, blood sugar was 584, at risk for diabetic ketoacidosis  With fluids and insulin he has improved to the 100s  I counseled him extensively  Repeat BMP in a.m.  He may need help at home  PT OT eval pending, possible home health    Hx of Clostridium difficile infection- (present on admission)  Assessment & Plan  Developed diarrhea and was diagnosed with C. difficile while at SNF on 7/23/2023  He continues to have some abdominal pain however diarrhea has resolved  Recommended treatment duration is 10 days  DC oral vancomycin as this therapy has been completed    Right hip pain- (present on admission)  Assessment & Plan  He had a hard fall at home about 1 week ago and has been having difficulty walking due to pain in the right hip ever since  X-ray of the hip did not show evidence for fracture  Follow-up MRI of the hip for further evaluation  PT OT    AP (abdominal pain)- (present on admission)  Assessment & Plan  Constant left sided abdominal pain without associated nausea vomiting or  changes in bowel habits.    He had an elevated AST and alk phos however right upper quadrant ultrasound was negative arguing against cholecystitis  Lipase was normal   I suspect this may have been related to early DKA as it has improved today  UA showed markedly elevated glucose however no evidence of infection however he may still have urinary retention  Check bladder scan  Wonder about PUD, start GI cocktail and Pepcid  Avoid narcotics  Repeat labs in a.m.    History of osteomyelitis of right hand (HCC)- (present on admission)  Assessment & Plan  Finished a course of intravenous antibiotics with Ancef while at SNF and subsequently was discharged home    Dyslipidemia- (present on admission)  Assessment & Plan  Most recently, total cholesterol 220,   He does not take a statin  Monitor    Diabetes mellitus type 2, insulin dependent, poorly controlled with marked hyperglycemia (HCC)- (present on admission)  Assessment & Plan  With marked hyperglycemia  Last glycated hemoglobin was 15.2%!!  With hydration and insulin his sugars have improved to the 100s  He will need both long and short acting, I discussed this with him in detail  Continue Lantus units twice daily  Sliding scale  Hypoglycemia protocol  Continue NS at 125    Noncompliance with medications- (present on admission)  Assessment & Plan  Counseling provided         VTE prophylaxis: Xarelto    I have performed a physical exam and reviewed and updated ROS and Plan today (8/4/2023). In review of yesterday's note (8/3/2023), there are no changes except as documented above.    Total time:  51 minutes.  I spent greater than 50% of the time for patient care, counseling, and coordination on this date, including unit/floor time, and face-to-face time with the patient as per interval events and assessment and plan above

## 2023-08-04 NOTE — ED NOTES
Pt medicated as prescribed  Provided emesis bag  Pt resting on gurney, pt in no acute distress, pt provided call light, instructed to call if needing any assistance, instructed not to get up by self, gurney in lowest position.

## 2023-08-04 NOTE — PROGRESS NOTES
0700-Received bedside report from night shift RN.  Assumed pt care.   Assessment and chart check complete.  Updated for the  POC of the day.  IVF infusing.  0800-Assessment complete. Pt is AA0X4, sleeping in bed,no signs of distress, denies any nausea/vomiting at this moment.  0900-Pt is eating breakfast in bed comfortably.  0924-Pt requesting pain medication. Medicated per MAR. OT at bedside.  1058-Pt is sleeping in bed. Blood sugar taken.  1111-Oral ABX given.  1200-Pt requesting pain medication. Medicated per MAR. Unlabored breathing. RR-18, oxygen saturation at 95%.  1400-Pt rounded on. Asleep at this time, RR 18, equal rise and fall of chest noted.    1600-Hooked new bag of fluids.  1700-Blood sugar checked.  1800-Medication given per MAR. Pt is eating dinner.  Fall precautions in place, treaded socks on pt, bed in low position.   Call light within reach.   Educated pt to call if needing anything.   Hourly rounding.    1900-Report given to night shift RN.

## 2023-08-04 NOTE — ASSESSMENT & PLAN NOTE
Finished a course of intravenous antibiotics with Ancef while at SNF and subsequently was discharged home

## 2023-08-04 NOTE — CARE PLAN
The patient is Stable - Low risk of patient condition declining or worsening    Shift Goals  Clinical Goals: Pt will manage pain at tolerable throughout the shift  Patient Goals: pain control, rest    Progress made toward(s) clinical / shift goals:  PRN medication given for pain controlled. Able to sleep comfortably after giving pain medication. GI cocktail given for heartburn.    Patient is not progressing towards the following goals:      Problem: Pain - Standard  Goal: Alleviation of pain or a reduction in pain to the patient’s comfort goal  Outcome: Progressing     Problem: Fall Risk  Goal: Patient will remain free from falls  Outcome: Progressing     Problem: Skin Integrity  Goal: Skin integrity is maintained or improved  Outcome: Progressing     Problem: Gastrointestinal Irritability  Goal: Nausea and vomiting will be absent or improve  Outcome: Progressing

## 2023-08-04 NOTE — H&P
Hospital Medicine History & Physical Note    Date of Service  8/3/2023    Primary Care Physician  YESY Tariq.    Consultants  None     Code Status  Full Code    Chief Complaint  Chief Complaint   Patient presents with    Abdominal Pain     LUQ pain x months worse the last day     GLF     1 week ago, broke toilet tank, R hip pain since fall    Urinary Frequency     Pt c/o thirst and urinary frequency hx of diabetes      History of Presenting Illness  Chritsoph Taylor is a 58 y.o. male with a past medical history of poorly controlled insulin-dependent diabetes, recent history of osteomyelitis completed a course of intravenous antibiotics, poor compliance with medication who presented 8/3/2023 with generalized weakness, abdominal pain, excessive thirst and increased urinary frequency.  The patient reports that he has not been feeling well over the past 1 week but got much worse over the past 2 days.  He reports increased thirst, abdominal pain and increased urinary frequency.  Patient denies having diarrhea or vomiting.     I discussed the plan of care with emergency department physician, and the patient.     Review of Systems  Review of Systems   Constitutional:  Positive for malaise/fatigue. Negative for chills and fever.   Eyes:  Negative for discharge and redness.   Respiratory:  Negative for cough, shortness of breath and stridor.    Cardiovascular:  Negative for chest pain and leg swelling.   Gastrointestinal:  Positive for abdominal pain and nausea. Negative for vomiting.   Genitourinary:  Negative for flank pain.   Musculoskeletal:  Positive for falls. Negative for myalgias.   Skin: Negative.    Neurological:  Negative for focal weakness.   Endo/Heme/Allergies:  Positive for polydipsia. Does not bruise/bleed easily.   Psychiatric/Behavioral:  The patient is not nervous/anxious.      Past Medical History   has a past medical history of Abnormal electrocardiogram (ECG) (EKG) (11/8/2021), KRISTAL (acute  Depth Of Biopsy: dermis kidney injury) ([facility]) (11/8/2021), Chest pain in adult (11/8/2021), CKD (chronic kidney disease) stage 3, GFR 30-59 ml/min ([facility]) (11/7/2021), Diabetes ([facility]), Diabetic ketoacidosis without coma associated with type 2 diabetes mellitus ([facility]) (11/7/2021), Diarrhea (3/18/2022), DKA, type 1, not at goal ([facility]) (7/28/2022), Esophagitis (7/28/2022), [place] (hard of hearing), and Hypercholesteremia.    Surgical History   has a past surgical history that includes other; pr upper gi endoscopy,diagnosis (N/A, 3/14/2023); and finger or hand incision and drainage (Right, 6/12/2023).     Family History  family history includes Heart Disease in his father.      Social History   reports that he has quit smoking. He has never used smokeless tobacco. He reports that he does not currently use alcohol. He reports that he does not currently use drugs.    Allergies  No Known Allergies    Medications  Prior to Admission Medications   Prescriptions Last Dose Informant Patient Reported? Taking?   Multiple Vitamin (ONE-A-DAY MENS) Tab 8/3/2023 at 0700 Patient Yes No   Sig: Take 1 Tablet by mouth every day.   Vancomycin HCl (VANCOMYCIN 50 MG/ML) 50 mg/mL Solution 8/3/2023 at 1500 Patient Yes No   Sig: Take 125 mg by mouth every 6 hours as needed.   ceFAZolin Sodium (ANCEF IV) 7/26/2023 at finished Patient Yes Yes   Sig: Infuse  into a venous catheter. 6 week course at [facility]   insulin GLARGINE (LANTUS,SEMGLEE) 100 UNIT/ML Solution 7/26/2023 at [facility] Patient No No   Sig: Inject 20 Units under the skin 2 times a day.   insulin lispro 100 UNIT/ML SC SOPN injection PEN 8/2/2023 at 1900 Patient No No   Sig: Inject 3-14 Units under the skin 4 Times a Day,Before Meals and at Bedtime. 70   - 150  mg/dL =    0 Units  151 - 200  mg/dL =    3 Units  201 - 250  mg/dL =    4 Units  251 - 300  mg/dL  =   7 Units  301 - 350  mg/dL  =   10 Units  351 - 400 mg/dL   =   12 Units  Over 400 mg/dL   =   14 Units   ondansetron (ZOFRAN ODT) 4 MG TABLET DISPERSIBLE  8/3/2023 at 0700 Patient Yes No   Sig: Take 4 mg by mouth 2 times a day as needed for Nausea/Vomiting.   oxyCODONE immediate-release (ROXICODONE) 5 MG Tab 8/3/2023 at 0700 Patient Yes No   Sig: Take 5 mg by mouth every 8 hours as needed for Severe Pain.      Facility-Administered Medications: None     Physical Exam  Temp:  [37.2 °C (99 °F)] 37.2 °C (99 °F)  Pulse:  [85-89] 89  Resp:  [16-19] 19  BP: ()/(62-71) 119/71  SpO2:  [90 %-97 %] 96 %  Blood Pressure: 92/62   Temperature: 37.2 °C (99 °F)   Pulse: 88   Respiration: 16   Pulse Oximetry: 95 %     Physical Exam  Constitutional:       General: He is not in acute distress.     Appearance: He is not ill-appearing or diaphoretic.   HENT:      Head: Atraumatic.      Right Ear: External ear normal.      Left Ear: External ear normal.      Nose: No congestion or rhinorrhea.      Mouth/Throat:      Mouth: Mucous membranes are dry.   Eyes:      General: No scleral icterus.        Right eye: No discharge.         Left eye: No discharge.      Pupils: Pupils are equal, round, and reactive to light.   Cardiovascular:      Rate and Rhythm: Regular rhythm. Tachycardia present.   Pulmonary:      Effort: Pulmonary effort is normal.   Abdominal:      General: There is no distension.      Tenderness: There is abdominal tenderness (Right upper quadrant, no CVA tenderness).   Musculoskeletal:      Cervical back: Neck supple. No rigidity. No muscular tenderness.      Right lower leg: No edema.      Left lower leg: No edema.      Comments: Reduced range of motion of the right hip secondary to pain   Skin:     General: Skin is dry.      Capillary Refill: Capillary refill takes 2 to 3 seconds.      Coloration: Skin is pale. Skin is not jaundiced.   Neurological:      Mental Status: He is alert and oriented to person, place, and time.      Coordination: Coordination normal.   Psychiatric:         Mood and Affect: Mood normal.         Behavior: Behavior normal.        Laboratory:  Recent Labs     08/01/23 1419 08/03/23 1628   WBC 6.5 10.5   RBC 4.89 5.47   HEMOGLOBIN 13.3* 14.9   HEMATOCRIT 40.9* 46.3   MCV 83.6 84.6   MCH 27.2 27.2   MCHC 32.5 32.2*   RDW 40.6 40.3   PLATELETCT 257 265   MPV 9.9 9.5     Recent Labs     08/01/23 1419 08/03/23 1628 08/03/23 2056   SODIUM 132* 136 138   POTASSIUM 4.5 4.6 4.1   CHLORIDE 92* 88* 95*   CO2 30 24 24   GLUCOSE 506* 584* 467*   BUN 26* 42* 38*   CREATININE 0.59 1.05 0.94   CALCIUM 9.5 8.9 8.2*     Recent Labs     08/01/23 1419 08/03/23 1628 08/03/23 2056   ALTSGPT 140* 78*  --    ASTSGOT 20 13  --    ALKPHOSPHAT 522* 476*  --    TBILIRUBIN 0.5 0.8  --    LIPASE  --  9*  --    GLUCOSE 506* 584* 467*         No results for input(s): NTPROBNP in the last 72 hours.      No results for input(s): TROPONINT in the last 72 hours.    Imaging:  US-RUQ   Final Result      Normal right upper quadrant ultrasound.      DX-HIP-COMPLETE - UNILATERAL 2+ RIGHT   Final Result      No radiographic evidence of acute traumatic injury.      MR-HIP-W/O RIGHT    (Results Pending)     Assessment/Plan:  Justification for Admission Status  I anticipate this patient will require at least two midnights for appropriate medical management, necessitating inpatient admission because patient has marked hyperglycemia, he is at risk for developing diabetic ketoacidosis.  Will require aggressive insulin treatment, monitoring blood sugars, electrolytes and volume status    Patient will need a Med/Surg bed on MEDICAL service. The patient has hyperglycemia and severe dehydration    * Hyperglycemia- (present on admission)  Assessment & Plan  Blood sugar is 584, at risk for diabetic ketoacidosis  I will start short and long-acting insulin  The patient is extremely dehydrated due to osmotic diuresis I will start intravenous fluids  Frequent BMP checks    Diabetes mellitus type 2, insulin dependent, poorly controlled with marked hyperglycemia (HCC)- (present on  admission)  Assessment & Plan  With marked hyperglycemia  Last glycated hemoglobin was 15.2%!!  I will start long & short acting insulin for now  I will order Accu-Checks, hypoglycemia protocol  Adjust according to blood sugars trend     Hx of Clostridium difficile infection- (present on admission)  Assessment & Plan  Patient reports she is still on his treatment for C. difficile colitis, however this was not on his medication reconciliation  Patient is not having diarrhea, last bowel movement was yesterday and was well formed  I will resume oral vancomycin    Right hip pain- (present on admission)  Assessment & Plan  X-ray of the hip did not show evidence for fracture  I will check MRI of the hip for further evaluation    AP (abdominal pain)- (present on admission)  Assessment & Plan  Differentials include early diabetic ketoacidosis versus cholecystitis  We will check right upper quadrant ultrasound given his elevated AST, and alkaline phosphatase  Multimodal pain control    History of osteomyelitis of right hand (HCC)- (present on admission)  Assessment & Plan  Finish a course of intravenous antibiotics with Ancef    Noncompliance with medications- (present on admission)  Assessment & Plan  Counseling provided    VTE prophylaxis: SCDs/TEDs and Xarelto 10 mg daily as prophylaxis

## 2023-08-04 NOTE — DIETARY
Nutrition services: Day 0 of admit.  Christoph Taylor is a 58 y.o. male with admitting DX of Hyperglycemia    Consult received for MST of 3 on nutrition screen due to report of 14-23 lb wt loss x 3 months and poor PO PTA.       Assessment:  Height: 182.9 cm (6')  Weight: 80.5 kg (177 lb 7.5 oz)  Body mass index is 24.07 kg/m²., BMI classification: WNL  Diet/Intake: low fiber (GI soft), Consistent CHO (diabetic)/ < 25% of breakfast    Evaluation:   Pt presented with abdominal pain. Hx includes poorly controlled DM1 and recent osteomyelitis. Pt not feeling well x 1 week w/ worsening symptoms x 2 days.   Pt presented w/ glucose of 584. This has improved  Labs: 8/4: glucose=186. 3/12/23: A1c=15.2  Meds: Lantus, SSI  RD visited pt in his room. Pt did not eat much of his lunch because he does not like pulled pork. Preferences discussed. He said that overall he likes all foods. Pt agreed to Boost Glucose Control TID w/ meals for additional nutrition.   Pt reports that he may have lost some weight but he does not know how much. Variable wt hx noted in Epic which pt said he also noticed while at the SNF. Pt was also not able to say whether he was eating less food PTA. He said that he was not able to get to the store and was eating what was available at home.   Pt did not appear malnourished.     Malnutrition Risk: ASPEN criteria not met    Recommendations/Plan:  Add Boost Glucose Control to meals TID   Encourage intake of >50%  Document intake of all meals and supplements  as % taken in ADL's to provide interdisciplinary communication across all shifts.   Monitor weight.  Nutrition rep will continue to see patient for ongoing meal and snack preferences.       RD will follow.

## 2023-08-04 NOTE — DISCHARGE PLANNING
Case Management Discharge Planning    Admission Date: 8/3/2023  GMLOS: 2  ALOS: 0    6-Clicks ADL Score: 19  6-Clicks Mobility Score: 16  PT and/or OT Eval ordered: Yes  Post-acute Referrals Ordered: No  Post-acute Choice Obtained: Yes  Has referral(s) been sent to post-acute provider:  NA      Anticipated Discharge Dispo: Discharge Disposition: Discharged to home/self care (01)    DME Needed: No    Action(s) Taken: Updated Provider/Nurse on Discharge Plan    Patient discussed during morning IDT rounds with team. Patient is not medically cleared for discharge at this time.  SWCM met with patient at bedside, patient identified  by self ID and wristband. SW introduced self, explained role and reason for visit. Patient lives alone. He states he has no friends and/ or family who can provide support. Patient receives income from [facility] approx $[phone] per year. Discuss discharge plan , possibly to SNF pending ot/ pt evaluation. Patient states he has been at [facility] in the past and would like to return. Patient has home health services with [facility].   [facility] will continue to follow up for safe disposition.    Escalations Completed: None    Medically Clear: No    Next Steps: pending medical clearance     Barriers to Discharge: Medical clearance    Is the patient up for discharge tomorrow: No  Care Transition Team Assessment    Information Source  Orientation Level: Oriented X4  Information Given By: Patient    Elopement Risk  Legal Hold: No  Ambulatory or Self Mobile in Wheelchair: No-Not an Elopement Risk  Disoriented: No  Psychiatric Symptoms: None  History of Wandering: No  Elopement this Admit: No  Vocalizing Wanting to Leave: No  Displays Behaviors, Body Language Wanting to Leave: No-Not at Risk for Elopement  Elopement Risk: Not at Risk for Elopement    Interdisciplinary Discharge Planning  Lives with - Patient's Self Care Capacity: Alone and Unable to Care For Self  Patient or legal guardian wants to designate a  caregiver: No  Support Systems: Children (Son is at the [place] area)  Housing / Facility: Mobile Home  [facility]  Durable Medical Equipment: Other - Specify (wheelchair)    Discharge Preparedness  What is your plan after discharge?: Uncertain - pending medical team collaboration    Vision / Hearing Impairment  Vision Impairment : No  Hearing Impairment : Yes  Hearing Impairment: Both Ears, Hearing Device Not Available  Domestic Abuse  Have you ever been the victim of abuse or violence?: No  Physical Abuse or Sexual Abuse: No  Verbal Abuse or Emotional Abuse: No  Possible Abuse/Neglect Reported to:: Not Applicable    Anticipated Discharge Information  Discharge Disposition: Discharged to home/self care (01)

## 2023-08-04 NOTE — PROGRESS NOTES
Received report from night shift RN. Pt is : PA&O  x4, 2L, iso for cdiff, GI soft CHO diet. Precautions put in place bed in the lowest position, call light within reach, and bed alarm is on.    0700: Seen pt at bedside and updated the board. Pt is seen asleep chest observed rising up/down and breathing. No other needs at the moment.    0800:Pt is seen asleep chest observed rising up/down and breathing. No other needs at the moment.    0900: OT is working with the pt. No other needs at the moment.   0942: OT was able to get the pt to stand for about 5 mins.     1000:Pt is seen asleep chest observed rising up/down and breathing. No other needs at the moment.    1100: CNA took vitals pt is comfortably relaxing in bed no other needs at the moment.   1130: RN notified CNA bladder scan need to be done when pt is awake.     1200: CNA did bladder scan 587mL. RN notified about results. Pt comfortably in bed asleep chest observed rising up/down and breathing.     1300:Pt is seen asleep chest observed rising up/down and breathing. No other needs at the moment.    1400:Pt is seen asleep chest observed rising up/down and breathing. No other needs at the moment.    1500:Pt is seen asleep chest observed rising up/down and breathing. No other needs at the moment.    1600:Pt is seen asleep chest observed rising up/down and breathing. No other needs at the moment.  1645: CNA took vitals.    1700:Pt is seen asleep chest observed rising up/down and breathing. No other needs at the moment.    1800: Pt is awake eating dinner comfortably in bed. No other needs at the moment.     1900: RN to night shift CNA report given.

## 2023-08-04 NOTE — ASSESSMENT & PLAN NOTE
He had a hard fall at home about 1 week ago and has been having difficulty walking due to pain in the right hip ever since  X-ray of the hip did not show evidence for fracture  Right hip MRI showed no evidence of fracture  PT OT recommending SNF

## 2023-08-04 NOTE — ASSESSMENT & PLAN NOTE
Developed diarrhea and was diagnosed with C. difficile while at SNF on 7/23/2023  He continues to have some abdominal pain however diarrhea has resolved  He completed 10 days of oral vancomycin, diarrhea has resolved

## 2023-08-04 NOTE — HOSPITAL COURSE
58-year-old male with a history of poorly controlled type 2 diabetes, medical noncompliance, recent right thumb osteomyelitis status post 6 weeks of IV antibiotic treatment at SNF who was recently discharged home and had a fall 1 week ago.  Since then, he has had progressively worsening right hip pain and presented to the hospital with 24 hours of severe left-sided abdominal pain.  He was found to have marked hyperglycemia with glucose greater than 500 associated with metabolic acidosis concerning for early DKA.  He was hospitalized for IV fluids, glucose control and further work-up of his abdominal and hip pain.

## 2023-08-04 NOTE — ED PROVIDER NOTES
ED Provider Note    CHIEF COMPLAINT  Chief Complaint   Patient presents with    Abdominal Pain     LUQ pain x months worse the last day     GLF     1 week ago, broke toilet tank, R hip pain since fall    Urinary Frequency     Pt c/o thirst and urinary frequency hx of diabetes        EXTERNAL RECORDS REVIEWED  Inpatient Notes discharge summary from June 16, 2023, hospital admission for abdominal pain and work-up, hypoxia, DKA    HPI/ROS  LIMITATION TO HISTORY   Select: : None  OUTSIDE HISTORIAN(S):  EMS regarding treatment in route, clinical condition    Christoph Taylor is a 58 y.o. male who presents with complaint of left upper quadrant abdominal pain.  He has had intermittent pain left upper quadrant for the last 4 months.  Secondary to recent admission to the hospital, missed an appointment for upper GI scope.  2 days ago the pain recurred, associated with nausea.  No vomiting or diarrhea.  Patient recently treated for C. difficile, states stools have become formed again.  Second complaint he fell into his toilet tank breaking it, has had subsequent right hip pain.  He had wheelchair available to him and has been using it since, stating it is too painful to walk.  Third complaint increased thirst and frequency of urination, he states concerning for high blood sugar, patient has history of diabetes.  No head or neck injury.  He states he takes daily oxycodone for chronic back pain.  He is requesting a dose of morphine.  No hematemesis or bloody stool.  No chest pain or shortness of breath.    PAST MEDICAL HISTORY   has a past medical history of Abnormal electrocardiogram (ECG) (EKG) (11/8/2021), KRISTAL (acute kidney injury) ([facility]) (11/8/2021), Chest pain in adult (11/8/2021), CKD (chronic kidney disease) stage 3, GFR 30-59 ml/min ([facility]) (11/7/2021), Diabetes ([facility]), Diabetic ketoacidosis without coma associated with type 2 diabetes mellitus ([facility]) (11/7/2021), Diarrhea (3/18/2022), DKA, type 1, not at goal ([facility])  (7/28/2022), Esophagitis (7/28/2022), [place] (hard of hearing), and Hypercholesteremia.    SURGICAL HISTORY   has a past surgical history that includes other; upper gi endoscopy,diagnosis (N/A, 3/14/2023); and finger or hand incision and drainage (Right, 6/12/2023).    FAMILY HISTORY  Family History   Problem Relation Age of Onset    Heart Disease Father        SOCIAL HISTORY  Social History     Tobacco Use    Smoking status: Former    Smokeless tobacco: Never   Vaping Use    Vaping Use: Not on file   Substance and Sexual Activity    Alcohol use: Not Currently    Drug use: Not Currently    Sexual activity: Not on file       CURRENT MEDICATIONS  Home Medications       Reviewed by Evelia Vivas R.N. (Registered Nurse) on 08/03/23 at 1629  Med List Status: <None>     Medication Last Dose Status   insulin GLARGINE (LANTUS,SEMGLEE) 100 UNIT/ML Solution  Active   insulin lispro 100 UNIT/ML SC SOPN injection PEN  Active   Multiple Vitamin (ONE-A-DAY MENS) Tab  Active   ondansetron (ZOFRAN ODT) 4 MG TABLET DISPERSIBLE  Active   oxyCODONE immediate-release (ROXICODONE) 5 MG Tab  Active   SUCRALFATE PO  Active   Vancomycin HCl (VANCOMYCIN 50 MG/ML) 50 mg/mL Solution  Active                    ALLERGIES  No Known Allergies    PHYSICAL EXAM  VITAL SIGNS: /66   Pulse 87   Temp 37.2 °C (99 °F) (Temporal)   Resp 18   Ht 1.829 m (6')   Wt 81.6 kg (180 lb)   SpO2 90%   BMI 24.41 kg/m²    GI: Left upper quadrant tenderness is mild, no palpable mass.  Right upper quadrant nontender.  Lower abdomen soft and nontender  Skin: No jaundice.  No overlying signs of abdominal trauma  Muscle skeletal: No left flank tenderness.  Right hip is tender, rotation and flexion of the right hip causes pain.  No leg shortening.  Pelvis is stable, left hip nontender.  Respiratory: Clear lung sounds  Cardiac: Regular rate and rhythm    DIAGNOSTIC STUDIES / PROCEDURES      LABS  Results for orders placed or performed during the hospital  encounter of 08/03/23   CBC WITH DIFFERENTIAL   Result Value Ref Range    WBC 10.5 4.8 - 10.8 K/uL    RBC 5.47 4.70 - 6.10 M/uL    Hemoglobin 14.9 14.0 - 18.0 g/dL    Hematocrit 46.3 42.0 - 52.0 %    MCV 84.6 81.4 - 97.8 fL    MCH 27.2 27.0 - 33.0 pg    MCHC 32.2 (L) 32.3 - 36.5 g/dL    RDW 40.3 35.9 - 50.0 fL    Platelet Count 265 164 - 446 K/uL    MPV 9.5 9.0 - 12.9 fL    Neutrophils-Polys 87.70 (H) 44.00 - 72.00 %    Lymphocytes 6.40 (L) 22.00 - 41.00 %    Monocytes 5.10 0.00 - 13.40 %    Eosinophils 0.20 0.00 - 6.90 %    Basophils 0.10 0.00 - 1.80 %    Immature Granulocytes 0.50 0.00 - 0.90 %    Nucleated RBC 0.00 0.00 - 0.20 /100 WBC    Neutrophils (Absolute) 9.20 (H) 1.82 - 7.42 K/uL    Lymphs (Absolute) 0.67 (L) 1.00 - 4.80 K/uL    Monos (Absolute) 0.53 0.00 - 0.85 K/uL    Eos (Absolute) 0.02 0.00 - 0.51 K/uL    Baso (Absolute) 0.01 0.00 - 0.12 K/uL    Immature Granulocytes (abs) 0.05 0.00 - 0.11 K/uL    NRBC (Absolute) 0.00 K/uL   COMP METABOLIC PANEL   Result Value Ref Range    Sodium 136 135 - 145 mmol/L    Potassium 4.6 3.6 - 5.5 mmol/L    Chloride 88 (L) 96 - 112 mmol/L    Co2 24 20 - 33 mmol/L    Anion Gap 24.0 (H) 7.0 - 16.0    Glucose 584 (HH) 65 - 99 mg/dL    Bun 42 (H) 8 - 22 mg/dL    Creatinine 1.05 0.50 - 1.40 mg/dL    Calcium 8.9 8.4 - 10.2 mg/dL    Correct Calcium 9.0 8.5 - 10.5 mg/dL    AST(SGOT) 13 12 - 45 U/L    ALT(SGPT) 78 (H) 2 - 50 U/L    Alkaline Phosphatase 476 (H) 30 - 99 U/L    Total Bilirubin 0.8 0.1 - 1.5 mg/dL    Albumin 3.9 3.2 - 4.9 g/dL    Total Protein 6.7 6.0 - 8.2 g/dL    Globulin 2.8 1.9 - 3.5 g/dL    A-G Ratio 1.4 g/dL   LIPASE   Result Value Ref Range    Lipase 9 (L) 11 - 82 U/L   URINALYSIS    Specimen: Urine, Clean Catch   Result Value Ref Range    Color Yellow     Character Clear     Specific Gravity 1.020 <1.035    Ph 5.0 5.0 - 8.0    Glucose >=1000 (A) Negative mg/dL    Ketones 40 (A) Negative mg/dL    Protein 30 (A) Negative mg/dL    Bilirubin Negative Negative     Nitrite Negative Negative    Leukocyte Esterase Negative Negative    Occult Blood Trace (A) Negative    Micro Urine Req Microscopic    URINE MICROSCOPIC (W/UA)   Result Value Ref Range    WBC 0-2 (A) /hpf    RBC Rare /hpf    Bacteria Negative None /hpf    Epithelial Cells Negative Few /hpf    Mucous Threads Rare /hpf    Hyaline Cast 0-2 /lpf   ESTIMATED GFR   Result Value Ref Range    GFR (CKD-EPI) 82 >60 mL/min/1.73 m 2   BETA-HYDROXYBUTYRIC ACID   Result Value Ref Range    beta-Hydroxybutyric Acid >8.00 (H) 0.02 - 0.27 mmol/L         RADIOLOGY  I have independently interpreted the diagnostic imaging associated with this visit and am waiting the final reading from the radiologist.   My preliminary interpretation is as follows: Ultrasound shows no evidence of cholelithiasis  Radiologist interpretation:   US-RUQ   Final Result      Normal right upper quadrant ultrasound.      DX-HIP-COMPLETE - UNILATERAL 2+ RIGHT   Final Result      No radiographic evidence of acute traumatic injury.            COURSE & MEDICAL DECISION MAKING    ED Observation Status?  No    INITIAL ASSESSMENT, COURSE AND PLAN  Care Narrative: Patient presents with 2 major complaints.  He has a history of DKA, poorly controlled diabetes.  He presents with a blood sugar here of 560.  Symptoms he cited included extreme thirst and polyuria.  Patient has elevated anion gap however normal CO2, suspect early DKA.  He was given 10 units of IV insulin and 2 L normal saline IV hydration.  Patient requested medication for pain, left upper quadrant abdominal pain which appears to be recurrent chronic pain.  No evidence of pancreatitis.  No bowel obstruction.  No evidence of peritonitis on exam.  Although majority of pain left upper quadrant, he has elevated alkaline phosphatase of unknown etiology.  Ultrasound was obtained of right upper quadrant, negative for cholelithiasis or other acute abnormality.      Second complaint right hip pain after falling a week  ago.  He already had a wheelchair he was discharged from his rehab hospital with after he developed weakness while there.  He had not been using it until he fell.  Since that time has been reluctant to stand and walk secondary to right hip pain.  Initial x-rays are negative.  I discussed with admitting hospitalist concern over possible occult fracture, plan for MRI of the hip to rule this out.    Patient had transient hypotension after administration of morphine for pain control, this improved with IV fluids.    HYDRATION: Based on the patient's presentation of Dehydration the patient was given IV fluids. IV Hydration was used because oral hydration was not adequate alone. Upon recheck following hydration, the patient was improving.      ADDITIONAL PROBLEM LIST  Elevated alkaline phosphatase: Unknown etiology, right upper quadrant ultrasound was negative    Dehydration: Suspect secondary to hyperglycemia, polyuria, started on IV normal saline    Right hip pain: Rule out occult fracture, initial x-rays negative    DISPOSITION AND DISCUSSIONS  I have discussed management of the patient with the following physicians and MICHELLE's: Admitting hospitalist service      FINAL DIAGNOSIS  1. Fall, initial encounter    2. Injury of right hip, initial encounter    3. Hyperglycemia    4. Dehydration    5. Left upper quadrant abdominal pain    6. Elevated alkaline phosphatase level       CRITICAL CARE  The very real possibilty of a deterioration of this patient's condition required the highest level of my preparedness for sudden, emergent intervention.  I provided critical care services, which included medication orders, frequent reevaluations of the patient's condition and response to treatment, ordering and reviewing test results, and discussing the case with various consultants.  The critical care time associated with the care of the patient was 35 minutes. Review chart for interventions. This time is exclusive of any other  billable procedures.       Electronically signed by: Jorge Ward M.D., 8/3/2023 5:02 PM

## 2023-08-04 NOTE — DISCHARGE PLANNING
Patient rolled back to observation/outpatient status per attending MD determination (Dr. Kelley) and UR committee MD secondary review (Dr. Chavarria). Condition code 44 completed

## 2023-08-04 NOTE — THERAPY
Occupational Therapy   Initial Evaluation     Patient Name: Christoph Taylor  Age:  58 y.o., Sex:  male  Medical Record #: [id number]  Today's Date: 8/4/2023     Precautions  Precautions: (P) Fall Risk, Other (See Comments)  Comments: (P) Left foot drop, has a Left AFO - left at home    Assessment  Patient is 58 y.o. male with a diagnosis of hyperglycemia.  PMH poorly controlled insulin dependent diabetes, osteomyelitis, poor compliance of medication.  Additional factors influencing patient status / progress: Left foot drop - wears L AFO for standing/ambulation - does not have L AFO with him, impaired balance, pain (Right upper quadrant of abdomen), limited activity tolerance, fall hx.  Pt lives alone in a mobile home in [place], [place].  No one is available to assist - family in the [facility] area.   Pt  reported being home for a week, prior to that he was at [facility] for post acute.  Pt reported while at home for the past week requiring assist for LB clothing management, did not shower because lack of assistance and getting around his home in a wc.  Pt reported 1+ weeks ago at [facility] Sup for FWW,  Sup for transfers and Min assist LB clothing management.  Pt demonstrated Supervision for supine to EOB sitting, CGA sit/stand via FWW, CGA SPT to bedside chair and BSC, Max assist LBCM, Mod I UBCM, Mod assist toileting.  Therapist reviewed/educated pt on environmental/home safety, fall precautions, AE/DME, ADL's and transfers.      Plan    Occupational Therapy Initial Treatment Plan   Treatment Interventions: (P) Self Care / Activities of Daily Living, Adaptive Equipment, Neuro Re-Education / Balance, Therapeutic Exercises, Therapeutic Activity  Treatment Frequency: (P) 3 Times per Week  Duration: (P) Until Therapy Goals Met    DC Equipment Recommendations: (P) Unable to determine at this time  Discharge Recommendations: (P) Recommend post-acute placement for additional occupational therapy services prior  to discharge home     Subjective    Pt was laert and cooperative w/ tx.     Objective       08/04/23 0845    Services   Is patient using  services for this encounter? No   Initial Contact Note    Initial Contact Note Order Received and Verified, Occupational Therapy Evaluation in Progress with Full Report to Follow.   Prior Living Situation   Housing / Facility Mobile Home   Steps Into Home 5   Steps In Home 0   Rail Both Rail (Steps into Home)   Bathroom Set up Walk In Shower;Grab Bars;Shower Chair   Equipment Owned Front-Wheel Walker;Single Point Cane;Wheelchair;Tub / Shower Seat;Grab Bar(s) In Tub / Shower;Bed Side Commode;Other (Comments)  (Left AFO, Knee scooter)   Lives with - Patient's Self Care Capacity Alone and Unable to Care For Self   Comments Pt lives alone in a mobile home in [place], [place].  No one is available to assist - family in the [facility] area.   Pt  reported being home for a week, prior to that he was at [facility] for post acute.  Pt reported while at home for the past week requiring assist for LB clothing management, did not shower because lack of assistance and getting around his home in a wc.  Pt reported 1+ weeks ago at [facility] Sup for FWW,  Sup for transfers and Min assist LB clothing management.   Prior Level of ADL Function   Self Feeding Independent   Grooming / Hygiene Independent   Bathing Other (Comments)   Dressing Requires Assist  (Mod I UB clothing management, Mod assist LB clothing management)   Toileting Independent   Prior Level of IADL Function   Prior Level Of Mobility Independent With Device in Home  (past week Indep wc management in home)   Driving / Transportation Driving Independent   History of Falls   History of Falls Yes   Precautions   Precautions Fall Risk;Other (See Comments)   Comments Left foot drop, has a Left AFO - left at home   Vitals   Pulse Oximetry 95 %   O2 (LPM) 2   O2 Delivery Device Silicone Nasal Cannula   Pain    Intervention Rest;Repositioned   Pain 0 - 10 Group   Location Abdomen   Location Orientation Anterior;Upper   Description Sore   Comfort Goal Comfort with Movement;Perform Activity   Therapist Pain Assessment Prior to Activity;During Activity;Post Activity;6;Nurse Notified  (Pt reported upper right quadrant of abdomen sore (6/10) with swallowing and coughing)   Cognition    Level of Consciousness Alert   Active ROM Upper Body   Active ROM Upper Body  WDL   Dominant Hand Right   Strength Upper Body   Upper Body Strength  WDL   Upper Body Muscle Tone   Upper Body Muscle Tone  WDL   Coordination Upper Body   Coordination WDL   Balance Assessment   Sitting Balance (Static) Fair +   Sitting Balance (Dynamic) Fair +   Weight Shift Sitting Fair   Bed Mobility    Supine to Sit Supervised   ADL Assessment   Upper Body Dressing Modified Independent   Lower Body Dressing Maximal Assist   Toileting Moderate Assist   How much help from another person does the patient currently need...   Putting on and taking off regular lower body clothing? 2   Bathing (including washing, rinsing, and drying)? 3   Toileting, which includes using a toilet, bedpan, or urinal? 2   Putting on and taking off regular upper body clothing? 4   Taking care of personal grooming such as brushing teeth? 4   Eating meals? 4   6 Clicks Daily Activity Score 19   Functional Mobility   Sit to Stand Contact Guard Assist   Bed, Chair, Wheelchair Transfer Contact Guard Assist   Toilet Transfers Contact Guard Assist  (BSC)   Comments Did not attempt ambulation secondary Left foot drop and Left AFO had been left at home.   Edema / Skin Assessment   Edema / Skin  Not Assessed   Activity Tolerance   Sitting in Chair 10   Sitting Edge of Bed 10   Standing 2 mins   Comments sitting BSC 5   Short Term Goals   Short Term Goal # 1 CGA LB clothing management via AE/DME   Short Term Goal # 2 SBA to transfer to/from BS   Short Term Goal # 3 SBA toileting task on BSC via FWW    Education Group   Education Provided Home Safety;Transfers;Role of Occupational Therapist;Activities of Daily Living;Adaptive Equipment;Use of Call Light   Role of Occupational Therapist Patient Response Patient;Acceptance;Explanation;Verbal Demonstration   Home Safety Patient Response Patient;Acceptance;Explanation;Demonstration;Verbal Demonstration   Transfers Patient Response Patient;Acceptance;Explanation;Demonstration;Verbal Demonstration;Action Demonstration;Reinforcement Needed   ADL Patient Response Patient;Acceptance;Explanation;Demonstration;Verbal Demonstration;Action Demonstration;Reinforcement Needed   Adaptive Equipment Patient Response Patient;Acceptance;Explanation;Demonstration;Verbal Demonstration;Action Demonstration;Reinforcement Needed   Use of Call Light Patient Response Patient;Acceptance;Explanation;Demonstration;Verbal Demonstration   Occupational Therapy Initial Treatment Plan    Treatment Interventions Self Care / Activities of Daily Living;Adaptive Equipment;Neuro Re-Education / Balance;Therapeutic Exercises;Therapeutic Activity   Treatment Frequency 3 Times per Week   Duration Until Therapy Goals Met   Problem List   Problem List Decreased Active Daily Living Skills;Decreased Homemaking Skills;Decreased Functional Mobility;Decreased Activity Tolerance;Safety Awareness Deficits / Cognition;Impaired Postural Control / Balance   Anticipated Discharge Equipment and Recommendations   DC Equipment Recommendations Unable to determine at this time   Discharge Recommendations Recommend post-acute placement for additional occupational therapy services prior to discharge home

## 2023-08-04 NOTE — PROGRESS NOTES
Pt arrived to [facility] via [facility] at 1954. Assumed pt care. Pt seen AO4, O2 at 2L via nc. IVF NS continued to floor, infusing at 125ml/hr. Chronic pain reported at 4/10, declined need for pain meds at this time. No nausea reported at this moment.   Safety and fall precautions in place, bed and strip alarm on. Call light within reach. Kept monitored.

## 2023-08-04 NOTE — CARE PLAN
The patient is Stable - Low risk of patient condition declining or worsening    Shift Goals  Clinical Goals: Monitor pts labs, BG will be lower than 200 post interventions  Patient Goals: Rest    Progress made toward(s) clinical / shift goals:  Pt labs monitored, Anion gap down to 9.0 post interventions. PRN pain and nausea medication given per mar. Pt seen resting post interventions. BG assessed, down to 212 at latest accucheck. Insulin long acting and short acting given per mar.     Patient is not progressing towards the following goals: BG did not go below 200 post interventions this shift.

## 2023-08-05 ENCOUNTER — APPOINTMENT (OUTPATIENT)
Dept: RADIOLOGY | Facility: MEDICAL CENTER | Age: 58
End: 2023-08-05
Attending: HOSPITALIST
Payer: COMMERCIAL

## 2023-08-05 LAB
ALBUMIN SERPL BCP-MCNC: 3.4 G/DL (ref 3.2–4.9)
ALBUMIN/GLOB SERPL: 1.4 G/DL
ALP SERPL-CCNC: 368 U/L (ref 30–99)
ALT SERPL-CCNC: 46 U/L (ref 2–50)
ANION GAP SERPL CALC-SCNC: 10 MMOL/L (ref 7–16)
AST SERPL-CCNC: 14 U/L (ref 12–45)
BASOPHILS # BLD AUTO: 0.3 % (ref 0–1.8)
BASOPHILS # BLD: 0.02 K/UL (ref 0–0.12)
BILIRUB SERPL-MCNC: 0.5 MG/DL (ref 0.1–1.5)
BUN SERPL-MCNC: 13 MG/DL (ref 8–22)
CALCIUM ALBUM COR SERPL-MCNC: 8.8 MG/DL (ref 8.5–10.5)
CALCIUM SERPL-MCNC: 8.3 MG/DL (ref 8.4–10.2)
CHLORIDE SERPL-SCNC: 99 MMOL/L (ref 96–112)
CO2 SERPL-SCNC: 31 MMOL/L (ref 20–33)
CREAT SERPL-MCNC: 0.55 MG/DL (ref 0.5–1.4)
EOSINOPHIL # BLD AUTO: 0.36 K/UL (ref 0–0.51)
EOSINOPHIL NFR BLD: 4.8 % (ref 0–6.9)
ERYTHROCYTE [DISTWIDTH] IN BLOOD BY AUTOMATED COUNT: 41.1 FL (ref 35.9–50)
GFR SERPLBLD CREATININE-BSD FMLA CKD-EPI: 114 ML/MIN/1.73 M 2
GLOBULIN SER CALC-MCNC: 2.4 G/DL (ref 1.9–3.5)
GLUCOSE BLD STRIP.AUTO-MCNC: 123 MG/DL (ref 65–99)
GLUCOSE BLD STRIP.AUTO-MCNC: 143 MG/DL (ref 65–99)
GLUCOSE BLD STRIP.AUTO-MCNC: 197 MG/DL (ref 65–99)
GLUCOSE BLD STRIP.AUTO-MCNC: 198 MG/DL (ref 65–99)
GLUCOSE SERPL-MCNC: 115 MG/DL (ref 65–99)
HCT VFR BLD AUTO: 43.8 % (ref 42–52)
HGB BLD-MCNC: 13.9 G/DL (ref 14–18)
IMM GRANULOCYTES # BLD AUTO: 0.02 K/UL (ref 0–0.11)
IMM GRANULOCYTES NFR BLD AUTO: 0.3 % (ref 0–0.9)
LYMPHOCYTES # BLD AUTO: 1.09 K/UL (ref 1–4.8)
LYMPHOCYTES NFR BLD: 14.6 % (ref 22–41)
MAGNESIUM SERPL-MCNC: 1.7 MG/DL (ref 1.5–2.5)
MCH RBC QN AUTO: 27.2 PG (ref 27–33)
MCHC RBC AUTO-ENTMCNC: 31.7 G/DL (ref 32.3–36.5)
MCV RBC AUTO: 85.7 FL (ref 81.4–97.8)
MONOCYTES # BLD AUTO: 0.6 K/UL (ref 0–0.85)
MONOCYTES NFR BLD AUTO: 8 % (ref 0–13.4)
NEUTROPHILS # BLD AUTO: 5.4 K/UL (ref 1.82–7.42)
NEUTROPHILS NFR BLD: 72 % (ref 44–72)
NRBC # BLD AUTO: 0 K/UL
NRBC BLD-RTO: 0 /100 WBC (ref 0–0.2)
PHOSPHATE SERPL-MCNC: 1.8 MG/DL (ref 2.5–4.5)
PLATELET # BLD AUTO: 204 K/UL (ref 164–446)
PMV BLD AUTO: 9.7 FL (ref 9–12.9)
POTASSIUM SERPL-SCNC: 3.4 MMOL/L (ref 3.6–5.5)
PROT SERPL-MCNC: 5.8 G/DL (ref 6–8.2)
RBC # BLD AUTO: 5.11 M/UL (ref 4.7–6.1)
SODIUM SERPL-SCNC: 140 MMOL/L (ref 135–145)
WBC # BLD AUTO: 7.5 K/UL (ref 4.8–10.8)

## 2023-08-05 PROCEDURE — A9270 NON-COVERED ITEM OR SERVICE: HCPCS | Performed by: INTERNAL MEDICINE

## 2023-08-05 PROCEDURE — 82962 GLUCOSE BLOOD TEST: CPT

## 2023-08-05 PROCEDURE — 700102 HCHG RX REV CODE 250 W/ 637 OVERRIDE(OP): Performed by: INTERNAL MEDICINE

## 2023-08-05 PROCEDURE — 99232 SBSQ HOSP IP/OBS MODERATE 35: CPT | Performed by: INTERNAL MEDICINE

## 2023-08-05 PROCEDURE — 96366 THER/PROPH/DIAG IV INF ADDON: CPT

## 2023-08-05 PROCEDURE — 700101 HCHG RX REV CODE 250: Performed by: INTERNAL MEDICINE

## 2023-08-05 PROCEDURE — A9270 NON-COVERED ITEM OR SERVICE: HCPCS | Performed by: HOSPITALIST

## 2023-08-05 PROCEDURE — 94760 N-INVAS EAR/PLS OXIMETRY 1: CPT

## 2023-08-05 PROCEDURE — 83735 ASSAY OF MAGNESIUM: CPT

## 2023-08-05 PROCEDURE — 700102 HCHG RX REV CODE 250 W/ 637 OVERRIDE(OP): Performed by: HOSPITALIST

## 2023-08-05 PROCEDURE — 85025 COMPLETE CBC W/AUTO DIFF WBC: CPT

## 2023-08-05 PROCEDURE — 96372 THER/PROPH/DIAG INJ SC/IM: CPT

## 2023-08-05 PROCEDURE — 36415 COLL VENOUS BLD VENIPUNCTURE: CPT

## 2023-08-05 PROCEDURE — 96365 THER/PROPH/DIAG IV INF INIT: CPT

## 2023-08-05 PROCEDURE — 84100 ASSAY OF PHOSPHORUS: CPT

## 2023-08-05 PROCEDURE — 97161 PT EVAL LOW COMPLEX 20 MIN: CPT

## 2023-08-05 PROCEDURE — 96368 THER/DIAG CONCURRENT INF: CPT

## 2023-08-05 PROCEDURE — 80053 COMPREHEN METABOLIC PANEL: CPT

## 2023-08-05 PROCEDURE — G0378 HOSPITAL OBSERVATION PER HR: HCPCS

## 2023-08-05 PROCEDURE — 700111 HCHG RX REV CODE 636 W/ 250 OVERRIDE (IP): Mod: JZ | Performed by: INTERNAL MEDICINE

## 2023-08-05 PROCEDURE — 73721 MRI JNT OF LWR EXTRE W/O DYE: CPT | Mod: RT

## 2023-08-05 PROCEDURE — 700105 HCHG RX REV CODE 258: Performed by: INTERNAL MEDICINE

## 2023-08-05 RX ORDER — MAGNESIUM SULFATE HEPTAHYDRATE 40 MG/ML
2 INJECTION, SOLUTION INTRAVENOUS ONCE
Status: COMPLETED | OUTPATIENT
Start: 2023-08-05 | End: 2023-08-05

## 2023-08-05 RX ADMIN — LIDOCAINE HYDROCHLORIDE 30 ML: 20 SOLUTION OROPHARYNGEAL at 23:29

## 2023-08-05 RX ADMIN — OXYCODONE HYDROCHLORIDE 5 MG: 5 TABLET ORAL at 05:01

## 2023-08-05 RX ADMIN — POTASSIUM PHOSPHATE, MONOBASIC AND POTASSIUM PHOSPHATE, DIBASIC 30 MMOL: 224; 236 INJECTION, SOLUTION, CONCENTRATE INTRAVENOUS at 10:48

## 2023-08-05 RX ADMIN — OXYCODONE HYDROCHLORIDE 5 MG: 5 TABLET ORAL at 12:42

## 2023-08-05 RX ADMIN — LIDOCAINE HYDROCHLORIDE 30 ML: 20 SOLUTION OROPHARYNGEAL at 01:24

## 2023-08-05 RX ADMIN — OXYCODONE HYDROCHLORIDE 5 MG: 5 TABLET ORAL at 18:03

## 2023-08-05 RX ADMIN — MAGNESIUM SULFATE HEPTAHYDRATE 2 G: 2 INJECTION, SOLUTION INTRAVENOUS at 09:07

## 2023-08-05 RX ADMIN — FAMOTIDINE 20 MG: 20 TABLET ORAL at 05:01

## 2023-08-05 RX ADMIN — RIVAROXABAN 10 MG: 10 TABLET, FILM COATED ORAL at 17:22

## 2023-08-05 RX ADMIN — FAMOTIDINE 20 MG: 20 TABLET ORAL at 17:22

## 2023-08-05 RX ADMIN — INSULIN GLARGINE-YFGN 20 UNITS: 100 INJECTION, SOLUTION SUBCUTANEOUS at 04:58

## 2023-08-05 RX ADMIN — SENNOSIDES AND DOCUSATE SODIUM 2 TABLET: 50; 8.6 TABLET ORAL at 17:22

## 2023-08-05 RX ADMIN — INSULIN GLARGINE-YFGN 20 UNITS: 100 INJECTION, SOLUTION SUBCUTANEOUS at 17:20

## 2023-08-05 RX ADMIN — LIDOCAINE HYDROCHLORIDE 30 ML: 20 SOLUTION OROPHARYNGEAL at 08:13

## 2023-08-05 RX ADMIN — LIDOCAINE HYDROCHLORIDE 30 ML: 20 SOLUTION OROPHARYNGEAL at 16:15

## 2023-08-05 ASSESSMENT — ENCOUNTER SYMPTOMS
ABDOMINAL PAIN: 1
DEPRESSION: 0
PALPITATIONS: 0
HEADACHES: 0
CHILLS: 0
FOCAL WEAKNESS: 0
VOMITING: 0
BACK PAIN: 0
NAUSEA: 0
FEVER: 0
DIARRHEA: 0
COUGH: 0
HALLUCINATIONS: 0
SHORTNESS OF BREATH: 0
FALLS: 1
SORE THROAT: 0
HEARTBURN: 0
BLOOD IN STOOL: 0
MYALGIAS: 0
DIZZINESS: 0
WEAKNESS: 1

## 2023-08-05 ASSESSMENT — PAIN DESCRIPTION - PAIN TYPE
TYPE: ACUTE PAIN

## 2023-08-05 ASSESSMENT — PAIN SCALES - PAIN ASSESSMENT IN ADVANCED DEMENTIA (PAINAD)
BODYLANGUAGE: RELAXED
CONSOLABILITY: NO NEED TO CONSOLE

## 2023-08-05 ASSESSMENT — GAIT ASSESSMENTS
GAIT LEVEL OF ASSIST: UNABLE TO PARTICIPATE
ASSISTIVE DEVICE: FRONT WHEEL WALKER

## 2023-08-05 NOTE — CARE PLAN
The patient is Stable - Low risk of patient condition declining or worsening    Shift Goals  Clinical Goals: pain control 3/10 or less; MRI done  Patient Goals: rest  Family Goals: n/a    Progress made toward(s) clinical / shift goals:  MRI done; pain controlled 3/10 or less    Patient is not progressing towards the following goals:

## 2023-08-05 NOTE — THERAPY
Physical Therapy   Initial Evaluation     Patient Name: Christoph Taylor  Age:  58 y.o., Sex:  male  Medical Record #: [id number]  Today's Date: 8/5/2023     Precautions  Precautions: Fall Risk    Assessment  Patient is 58 y.o. male with a diagnosis of Hyperglycemia Pt lives at home in a mobile home and is usually mod active.He feels really weak after being in a SNF x 5 weeks.He says he needs L AFO and tennis shoe to ambulate,these are at home.Acute PT needed to improve transfers and ambulation. Pt appears to be self limiting    Plan    Physical Therapy Initial Treatment Plan   Treatment Plan : Gait Training, Therapeutic Exercise, Therapeutic Activities, Stair Training, Neuro Re-Education / Balance  Treatment Frequency: 4 Times per Week    DC Equipment Recommendations: None  Discharge Recommendations: Recommend post-acute placement for additional physical therapy services prior to discharge home        Objective       08/05/23 1300   Charge Group   PT Evaluation PT Evaluation Low   Total Time Spent   PT Evaluation Time Spent (Mins) 25   PT Total Time Spent (Calculated) 25   Initial Contact Note    Initial Contact Note Order Received and Verified, Physical Therapy Evaluation in Progress with Full Report to Follow.   Precautions   Precautions Fall Risk   Prior Living Situation   Prior Services None   Housing / Facility Mobile Home   Steps Into Home 5   Steps In Home 0   Equipment Owned Front-Wheel Walker   Lives with - Patient's Self Care Capacity Alone and Unable to Care For Self   Prior Level of Functional Mobility   Bed Mobility Independent   Transfer Status Independent   Ambulation Independent   Ambulation Distance limited community   Assistive Devices Used None   Stairs Independent   Cognition    Cognition / Consciousness WDL   Passive ROM Lower Body   Passive ROM Lower Body WDL   Strength Lower Body   Comments general weakness   Coordination Lower Body    Coordination Lower Body  WDL   Balance Assessment    Sitting Balance (Static) Good   Sitting Balance (Dynamic) Good   Standing Balance (Static) Fair +   Standing Balance (Dynamic) Fair   Weight Shift Sitting Good   Weight Shift Standing Fair   Bed Mobility    Supine to Sit Supervised   Sit to Supine Standby Assist   Scooting Standby Assist   Gait Analysis   Gait Level Of Assist Unable to Participate   Assistive Device Front Wheel Walker   Weight Bearing Status full   Functional Mobility   Sit to Stand Supervised   Bed, Chair, Wheelchair Transfer Supervised   Activity Tolerance   Sitting Edge of Bed 10   Standing 3x 1 min   Patient / Family Goals    Patient / Family Goal #1 not stated   Short Term Goals    Short Term Goal # 1 I transfers in 4 V   Short Term Goal # 2 I amb x 100 feet in 4 V   Short Term Goal # 3 S x 4 stairs in 4 V   Physical Therapy Initial Treatment Plan    Treatment Plan  Gait Training;Therapeutic Exercise;Therapeutic Activities;Stair Training;Neuro Re-Education / Balance   Treatment Frequency 4 Times per Week   Problem List    Problems Impaired Transfers;Impaired Ambulation;Functional Strength Deficit   Anticipated Discharge Equipment and Recommendations   DC Equipment Recommendations None   Discharge Recommendations Recommend post-acute placement for additional physical therapy services prior to discharge home   Interdisciplinary Plan of Care Collaboration   IDT Collaboration with  Nursing   Session Information   Date / Session Number  8/5-1 1/4 8/11

## 2023-08-05 NOTE — PROGRESS NOTES
Patient refused to ambulate hallways with PT without his AFO. He will try to have one of his friends bring his AFO.

## 2023-08-05 NOTE — PROGRESS NOTES
Pt sleeping with unlabored breathing  2130 pt sleeping woke up for vitals to be taken  2340 pt sleeping on back urinal emptied  0109 pt up to bsc with rn at bedside  0326 pt awake being verbally aggressive insisting that I come in and get his urinal now i've tried to tell pt 2 times tonight that I will not come in the room until I put on my ppe he then yelled if I don't get that urinal now he's going to pee on the floor.

## 2023-08-05 NOTE — PROGRESS NOTES
2100 - pt resting in bed; VSS, given prn pain meds, pulse ox on; bed alarm ON.   2300 - pt sleeping in bed, normal respirations.   0100 - pt sleeping in bed, normal O2 sats  0115 -  came to draw blood, pt complained of abdominal discomfort and given prn meds for heartburn.  Pt verbally abusive in approaching staffs(RN/CNA). Pt very impatient, pt educated to wait for staff to assist in bedside commode but does not listen, bed alarms on, charge nurse aware.   0255- pt sleeping in bed, normal respirations,   0330- pt sleeping in bed, no needs at this time.  0500 pt awake, vitals taken , requested for prn pain meds., given

## 2023-08-05 NOTE — PROGRESS NOTES
"Received report from night shift RN. Pt is A&O x4, 2L, can be combative, low fiber and G soft diet, MRI today. Precautions put in place bed in the lowest position, call light within reach, and bed alarm is on.    0700: Seen pt at bedside and updated the board. Pt is awake eating breakfast comfortably in bed. Empty urinal 2000mL. No other needs at the moment.    0800: Pt is asleep chest observed rising up/down and breathing. No other needs at the moment.    0900:Pt is asleep chest observed rising up/down and breathing. No other needs at the moment.    1000: Pt is awake relaxing in bed no other needs at the moment.   1047: Pt is off isolation for c-diff.   1050: CNA took vitals.    1100:Pt is awake relaxing in bed no other needs at the moment.   1118: Pt transported to [facility] by malcolm.  1130: Changed bed linen.     1200: Pt is back from [facility]. Pt is comfortably back in bed no other needs at the moment.    1300:Pt is asleep chest observed rising up/down and breathing. No other needs at the moment.  1337: PT is working with pt but pt refuse to participate pt states \"he doesn't have his AFO\". PT asked pt to have his family/friends to bring his AFO PT is able to start therapy.     1400:Pt is asleep chest observed rising up/down and breathing. No other needs at the moment.    1500:Pt is asleep chest observed rising up/down and breathing. No other needs at the moment.  1542: Pt states \"I don't like the food it making me have stomach aches I would like something mild or broth\" Called kitchen and left a message for pt request.     1600: Pt received broth as requested. Pt is comfortably in bed eating broth no other needs at the moment.  1626: CNA took vitals.    1700: Pt is comfortably in bed eating dinner. No other needs at the moment.   1735: Pt states \" I can't tolerate tomoto sauce\". Kitchen notified and kitchen staff came at bedside to take pt order and likes/dislikes. Pt agreed on chicken noodle soup and kitchen will bring " it down soon.   1751: Kitchen staff brought food over.    1800: Pt is awake eating dinner comfortably in bed. No other needs at the moment.     1900: RN to night shift CNA report given.

## 2023-08-05 NOTE — CARE PLAN
The patient is Stable - Low risk of patient condition declining or worsening    Shift Goals  Clinical Goals: Pt's pain will be tolerable at end of shift; continued on IV fluids.  Patient Goals: Rest and sleep.  Family Goals: na    Progress made toward(s) clinical / shift goals:      Pt's abdominal pain was tolerable at end of shift; blood sugar accuchecks monitored, latest , long acting insulin given with sugar free snacks provided ;   Pt was able to use bedside commode many times standing up and step and pivot, tolerated well.  No BM noted during shift.   Pt noted to be verbally aggressive/abusive when irritated.     Patient is not progressing towards the following goals:

## 2023-08-05 NOTE — PROGRESS NOTES
Hospital Medicine Daily Progress Note    Date of Service  8/5/2023    Chief Complaint  Christoph Taylor is a 58 y.o. male admitted 8/3/2023 with abdominal pain    Hospital Course  58-year-old male with a history of poorly controlled type 2 diabetes, medical noncompliance, recent right thumb osteomyelitis status post 6 weeks of IV antibiotic treatment at SNF who was recently discharged home and had a fall 1 week ago.  Since then, he has had progressively worsening right hip pain and presented to the hospital with 24 hours of severe left-sided abdominal pain.  He was found to have marked hyperglycemia with glucose greater than 500 associated with metabolic acidosis concerning for early DKA.  He was hospitalized for IV fluids, glucose control and further work-up of his abdominal and hip pain.    Interval Problem Update  8/4: Glucose levels have significantly improved with insulin and fluids.  Pending right hip MRI.  Bladder scan pending.  P.o. vancomycin discontinued, completed antibiotic course for C. difficile.  PT OT pending    8/5: Low phos K and mag, all replaced.  Pending hip MRI.  Bladder scan with 120 cc, negative for retention.     I have discussed this patient's plan of care and discharge plan at IDT rounds today with Case Management, Nursing, Nursing leadership, and other members of the IDT team.    Consultants/Specialty  None    Code Status  Full Code    Disposition  The patient is not medically cleared for discharge to home or a post-acute facility.  Anticipate discharge to: skilled nursing facility    I have placed the appropriate orders for post-discharge needs.    Review of Systems  Review of Systems   Constitutional:  Positive for malaise/fatigue. Negative for chills and fever.   HENT:  Negative for sore throat.    Respiratory:  Negative for cough and shortness of breath.    Cardiovascular:  Negative for chest pain and palpitations.   Gastrointestinal:  Positive for abdominal pain (Stable, no  radiation). Negative for blood in stool, diarrhea, heartburn, nausea and vomiting.   Genitourinary:  Negative for dysuria and frequency.   Musculoskeletal:  Positive for falls (Prior to admission) and joint pain. Negative for back pain and myalgias.   Neurological:  Positive for weakness. Negative for dizziness, focal weakness and headaches.   Psychiatric/Behavioral:  Negative for depression and hallucinations.    All other systems reviewed and are negative.       Physical Exam  Temp:  [36.6 °C (97.8 °F)-37.2 °C (98.9 °F)] 36.6 °C (97.8 °F)  Pulse:  [76-88] 76  Resp:  [18] 18  BP: (113-150)/(68-93) 113/70  SpO2:  [90 %-98 %] 90 %    Physical Exam  Constitutional:       Comments: Unkempt   HENT:      Head: Normocephalic and atraumatic.      Nose: Nose normal.      Mouth/Throat:      Mouth: Mucous membranes are moist.   Eyes:      Extraocular Movements: Extraocular movements intact.      Pupils: Pupils are equal, round, and reactive to light.   Cardiovascular:      Rate and Rhythm: Normal rate and regular rhythm.      Heart sounds: No murmur heard.  Pulmonary:      Effort: Pulmonary effort is normal. No respiratory distress.      Breath sounds: Normal breath sounds. No stridor.   Abdominal:      General: Abdomen is flat. Bowel sounds are normal. There is no distension.      Palpations: Abdomen is soft.      Tenderness: There is abdominal tenderness. There is no guarding.   Musculoskeletal:         General: No swelling, tenderness or deformity.      Cervical back: Normal range of motion and neck supple.   Skin:     General: Skin is warm and dry.      Coloration: Skin is pale.   Neurological:      General: No focal deficit present.      Mental Status: He is alert and oriented to person, place, and time. Mental status is at baseline.   Psychiatric:         Mood and Affect: Mood normal.         Behavior: Behavior normal.         Thought Content: Thought content normal.         Fluids    Intake/Output Summary (Last 24  hours) at 8/5/2023 1414  Last data filed at 8/5/2023 0300  Gross per 24 hour   Intake 360 ml   Output 2200 ml   Net -1840 ml         Laboratory  Recent Labs     08/03/23  1628 08/04/23  0206 08/05/23  0106   WBC 10.5 12.2* 7.5   RBC 5.47 4.82 5.11   HEMOGLOBIN 14.9 13.1* 13.9*   HEMATOCRIT 46.3 40.3* 43.8   MCV 84.6 83.6 85.7   MCH 27.2 27.2 27.2   MCHC 32.2* 32.5 31.7*   RDW 40.3 40.5 41.1   PLATELETCT 265 230 204   MPV 9.5 9.3 9.7       Recent Labs     08/04/23  0206 08/04/23  0628 08/05/23  0106   SODIUM 139 139 140   POTASSIUM 3.9 3.8 3.4*   CHLORIDE 98 103 99   CO2 32 29 31   GLUCOSE 219* 186* 115*   BUN 32* 27* 13   CREATININE 0.80 0.63 0.55   CALCIUM 8.4 8.2* 8.3*                     Imaging  MR-HIP-W/O RIGHT   Final Result      1.  No evidence of pelvic or proximal femoral fracture.      2.  Moderate osteoarthritis of the right hip.      3.  Diffuse degenerative tearing of ossification of the acetabular labrum.      4.  Appearance of the proximal femur which has been described in the clinical syndrome of femoro-acetabular impingement.      5.  Degenerative change of the lumbar spine.      6.  Tendinopathy of the gluteus medius and minimus tendons.      7.  Similar changes seen involving the left hip as well.      US-RUQ   Final Result      Normal right upper quadrant ultrasound.      DX-HIP-COMPLETE - UNILATERAL 2+ RIGHT   Final Result      No radiographic evidence of acute traumatic injury.             Assessment/Plan  * Hyperglycemia- (present on admission)  Assessment & Plan  On arrival, blood sugar was 584, at risk for diabetic ketoacidosis  With fluids and insulin he has improved to the 100s  I counseled him extensively  Repeat BMP in a.m.  PT OT eval recommending SNF, referral placed    Hx of Clostridium difficile infection- (present on admission)  Assessment & Plan  Developed diarrhea and was diagnosed with C. difficile while at SNF on 7/23/2023  He continues to have some abdominal pain however  diarrhea has resolved  He completed 10 days of oral vancomycin, diarrhea has resolved    Right hip pain- (present on admission)  Assessment & Plan  He had a hard fall at home about 1 week ago and has been having difficulty walking due to pain in the right hip ever since  X-ray of the hip did not show evidence for fracture  Follow-up MRI of the hip for further evaluation  PT OT recommending SNF    AP (abdominal pain)- (present on admission)  Assessment & Plan  Constant left sided abdominal pain without associated nausea vomiting or changes in bowel habits.    He had an elevated AST and alk phos however right upper quadrant ultrasound was negative arguing against cholecystitis  Lipase was normal   I suspect this may have been related to early DKA  UA showed markedly elevated glucose however no evidence of infection however he may still have urinary retention  Bladder scan ok  Began treatment for PUD, continue GI cocktail and Pepcid   he plans to follow-up with GI as an outpatient  Avoid narcotics  Repeat labs in a.m.    History of osteomyelitis of right hand (HCC)- (present on admission)  Assessment & Plan  Finished a course of intravenous antibiotics with Ancef while at SNF and subsequently was discharged home    Dyslipidemia- (present on admission)  Assessment & Plan  Most recently, total cholesterol 220,   He does not take a statin  Monitor    Diabetes mellitus type 2, insulin dependent, poorly controlled with marked hyperglycemia (HCC)- (present on admission)  Assessment & Plan  With marked hyperglycemia  Last glycated hemoglobin was 15.2%!!  With hydration and insulin his sugars have improved to the 100s  He will need both long and short acting, I discussed this with him in detail  Continue Lantus units twice daily  Sliding scale  Hypoglycemia protocol  Continue NS at 125    Noncompliance with medications- (present on admission)  Assessment & Plan  Counseling provided         VTE prophylaxis: Xarelto    I  have performed a physical exam and reviewed and updated ROS and Plan today (8/5/2023). In review of yesterday's note (8/4/2023), there are no changes except as documented above.

## 2023-08-05 NOTE — PROGRESS NOTES
As stated by previous nurse Bettie CASPER, MRI screening was completed.   2218 - called MRI dept, went to voicemail, left a msg that MRI screening was done and was no longer open in Epic, charge nurse aware.     Next DEXA 2021

## 2023-08-05 NOTE — PROGRESS NOTES
"Received bedside report from night RN. Patient A&Ox4. Reports mid upper abdominal discomfort; managed per MAR. Reports feeling \"crappy\". Discussed POC and understood. Safety and fall precaution in place. Call light placed within reach with instructions to call anytime for assistance. Will continue to monitor.   "

## 2023-08-05 NOTE — PROGRESS NOTES
Received report from dayshift RN. Pt resting in bed, sleeping. Pt A&O x4, on 2 lpm. Pt denies pain at this time. Pt continued on IV fluids,. Pt updated with POC which includes pain mgt and blood glucose checks. Call light within reach, bed alarm on, fall precautions in place, all needs met at this time.

## 2023-08-06 VITALS
HEIGHT: 72 IN | WEIGHT: 177.47 LBS | OXYGEN SATURATION: 96 % | SYSTOLIC BLOOD PRESSURE: 104 MMHG | RESPIRATION RATE: 18 BRPM | HEART RATE: 74 BPM | DIASTOLIC BLOOD PRESSURE: 68 MMHG | TEMPERATURE: 98 F | BODY MASS INDEX: 24.04 KG/M2

## 2023-08-06 LAB
GLUCOSE BLD STRIP.AUTO-MCNC: 204 MG/DL (ref 65–99)
GLUCOSE BLD STRIP.AUTO-MCNC: 221 MG/DL (ref 65–99)
GLUCOSE BLD STRIP.AUTO-MCNC: 226 MG/DL (ref 65–99)

## 2023-08-06 PROCEDURE — 700102 HCHG RX REV CODE 250 W/ 637 OVERRIDE(OP): Performed by: HOSPITALIST

## 2023-08-06 PROCEDURE — 700102 HCHG RX REV CODE 250 W/ 637 OVERRIDE(OP): Performed by: INTERNAL MEDICINE

## 2023-08-06 PROCEDURE — A9270 NON-COVERED ITEM OR SERVICE: HCPCS | Performed by: HOSPITALIST

## 2023-08-06 PROCEDURE — 82962 GLUCOSE BLOOD TEST: CPT | Mod: 91

## 2023-08-06 PROCEDURE — 99232 SBSQ HOSP IP/OBS MODERATE 35: CPT | Performed by: INTERNAL MEDICINE

## 2023-08-06 PROCEDURE — A9270 NON-COVERED ITEM OR SERVICE: HCPCS | Performed by: INTERNAL MEDICINE

## 2023-08-06 PROCEDURE — 96372 THER/PROPH/DIAG INJ SC/IM: CPT

## 2023-08-06 PROCEDURE — 94760 N-INVAS EAR/PLS OXIMETRY 1: CPT

## 2023-08-06 PROCEDURE — G0378 HOSPITAL OBSERVATION PER HR: HCPCS

## 2023-08-06 RX ADMIN — FAMOTIDINE 20 MG: 20 TABLET ORAL at 17:09

## 2023-08-06 RX ADMIN — RIVAROXABAN 10 MG: 10 TABLET, FILM COATED ORAL at 17:09

## 2023-08-06 RX ADMIN — OXYCODONE HYDROCHLORIDE 5 MG: 5 TABLET ORAL at 04:38

## 2023-08-06 RX ADMIN — FAMOTIDINE 20 MG: 20 TABLET ORAL at 04:38

## 2023-08-06 RX ADMIN — INSULIN GLARGINE-YFGN 20 UNITS: 100 INJECTION, SOLUTION SUBCUTANEOUS at 17:07

## 2023-08-06 RX ADMIN — OXYCODONE HYDROCHLORIDE 5 MG: 5 TABLET ORAL at 17:09

## 2023-08-06 RX ADMIN — INSULIN GLARGINE-YFGN 20 UNITS: 100 INJECTION, SOLUTION SUBCUTANEOUS at 06:22

## 2023-08-06 RX ADMIN — LIDOCAINE HYDROCHLORIDE 30 ML: 20 SOLUTION OROPHARYNGEAL at 12:32

## 2023-08-06 RX ADMIN — OXYCODONE HYDROCHLORIDE 5 MG: 5 TABLET ORAL at 11:24

## 2023-08-06 ASSESSMENT — ENCOUNTER SYMPTOMS
NERVOUS/ANXIOUS: 1
BLOOD IN STOOL: 0
WEAKNESS: 1
SORE THROAT: 0
HALLUCINATIONS: 0
COUGH: 0
SHORTNESS OF BREATH: 0
FEVER: 0
PALPITATIONS: 0
HEADACHES: 0
VOMITING: 0
MYALGIAS: 0
DIARRHEA: 0
NAUSEA: 0
CHILLS: 0
BACK PAIN: 0
FALLS: 1
FOCAL WEAKNESS: 0
HEARTBURN: 0
DIZZINESS: 0
DEPRESSION: 0
ABDOMINAL PAIN: 1

## 2023-08-06 ASSESSMENT — PAIN DESCRIPTION - PAIN TYPE
TYPE: ACUTE PAIN

## 2023-08-06 ASSESSMENT — FIBROSIS 4 INDEX: FIB4 SCORE: 0.6

## 2023-08-06 NOTE — PROGRESS NOTES
Pt is supine when received. No complaints of pain at this time. Verbalizes needs well and demonstrates use of call bell. Call bell in reach.    Chart check completed    2100 eyes closed chest rising  2300 eyes open chest rising

## 2023-08-06 NOTE — PROGRESS NOTES
"Charge RN note:      Charge RN answered patient call light, pt expressed verbal frustration regarding speaking with MD. Pt would not state to BSRN or charge RN what the question was regarding. Charge RN explained that MD was unavailable at this time and asked again what it was regarding. Pt expressed concern and frustration regarding scheduling of procedure. MD notified and aware.     Re rounded with BSRN and Charge RN:  Pt became extremely confrontational with charge RN. Charge was attempting to update patient on POC and ask pt if he would either be willing to go to SNF or refuse,  If that was the recommendation. Pt unwilling to answer this RN, stated he didn't understand why he was still being held here if nothing was happening. Pt educated that SNF was recommended based on current notes and pt still unwilling to answer if he was refusing that or not. Pt stated \"I have [facility] so I don't need that\"  Pt continued to refuse to answer if he would be refusing SNF then as he said, \"I don't need to make that decision right now\" Attempted to educate pt that it was his right to refuse and that if HH was already organized we could attempt to speak with MD regarding DC with HH if pt was going to refuse SNF. PT re educated multiple times in conversation and pt still unwilling to answer question on wether or not he was refusing and wanting to proceed with services he stated were already set up.   "

## 2023-08-06 NOTE — PROGRESS NOTES
Hospital Medicine Daily Progress Note    Date of Service  8/6/2023    Chief Complaint  Christoph Taylor is a 58 y.o. male admitted 8/3/2023 with abdominal pain    Hospital Course  58-year-old male with a history of poorly controlled type 2 diabetes, medical noncompliance, recent right thumb osteomyelitis status post 6 weeks of IV antibiotic treatment at SNF who was recently discharged home and had a fall 1 week ago.  Since then, he has had progressively worsening right hip pain and presented to the hospital with 24 hours of severe left-sided abdominal pain.  He was found to have marked hyperglycemia with glucose greater than 500 associated with metabolic acidosis concerning for early DKA.  He was hospitalized for IV fluids, glucose control and further work-up of his abdominal and hip pain.    Interval Problem Update  8/4: Glucose levels have significantly improved with insulin and fluids.  Pending right hip MRI.  Bladder scan pending.  P.o. vancomycin discontinued, completed antibiotic course for C. difficile.  PT OT pending    8/5: Low phos K and mag, all replaced.  Pending hip MRI.  Bladder scan with 120 cc, negative for retention.     8/6: MRI right hip negative, still with persistent left sided abdominal pain which he states is worse with eating however he is tolerating diet.  Sugar control remains adequate on Lantus.  Pending [facility] authorization    I have discussed this patient's plan of care and discharge plan at IDT rounds today with Case Management, Nursing, Nursing leadership, and other members of the IDT team.    Consultants/Specialty  None    Code Status  Full Code    Disposition  The patient is medically cleared for discharge to home or a post-acute facility.  Anticipate discharge to: skilled nursing facility    I have placed the appropriate orders for post-discharge needs.    Review of Systems  Review of Systems   Constitutional:  Positive for malaise/fatigue. Negative for chills and fever.   HENT:   Negative for sore throat.    Respiratory:  Negative for cough and shortness of breath.    Cardiovascular:  Negative for chest pain and palpitations.   Gastrointestinal:  Positive for abdominal pain (Stable, no radiation). Negative for blood in stool, diarrhea, heartburn, nausea and vomiting.   Genitourinary:  Negative for dysuria and frequency.   Musculoskeletal:  Positive for falls (Prior to admission) and joint pain. Negative for back pain and myalgias.   Neurological:  Positive for weakness. Negative for dizziness, focal weakness and headaches.   Psychiatric/Behavioral:  Negative for depression and hallucinations. The patient is nervous/anxious.    All other systems reviewed and are negative.       Physical Exam  Temp:  [36.9 °C (98.5 °F)-37.3 °C (99.1 °F)] 36.9 °C (98.5 °F)  Pulse:  [62-71] 66  Resp:  [15-18] 18  BP: (110-126)/(52-78) 126/78  SpO2:  [90 %-94 %] 90 %    Physical Exam  Constitutional:       Comments: Unkempt   HENT:      Head: Normocephalic and atraumatic.      Nose: Nose normal.      Mouth/Throat:      Mouth: Mucous membranes are moist.   Eyes:      Extraocular Movements: Extraocular movements intact.      Pupils: Pupils are equal, round, and reactive to light.   Cardiovascular:      Rate and Rhythm: Normal rate and regular rhythm.      Heart sounds: No murmur heard.  Pulmonary:      Effort: Pulmonary effort is normal. No respiratory distress.      Breath sounds: Normal breath sounds. No stridor.   Abdominal:      General: Abdomen is flat. Bowel sounds are normal. There is no distension.      Palpations: Abdomen is soft.      Tenderness: There is abdominal tenderness. There is no guarding.   Musculoskeletal:         General: No swelling, tenderness or deformity.      Cervical back: Normal range of motion and neck supple.   Skin:     General: Skin is warm and dry.      Coloration: Skin is pale.   Neurological:      General: No focal deficit present.      Mental Status: He is alert and oriented to  person, place, and time. Mental status is at baseline.   Psychiatric:         Mood and Affect: Affect is angry.         Behavior: Behavior is agitated and combative.         Judgment: Judgment is impulsive and inappropriate.         Fluids    Intake/Output Summary (Last 24 hours) at 8/6/2023 1130  Last data filed at 8/6/2023 0400  Gross per 24 hour   Intake 600 ml   Output 600 ml   Net 0 ml         Laboratory  Recent Labs     08/03/23  1628 08/04/23  0206 08/05/23  0106   WBC 10.5 12.2* 7.5   RBC 5.47 4.82 5.11   HEMOGLOBIN 14.9 13.1* 13.9*   HEMATOCRIT 46.3 40.3* 43.8   MCV 84.6 83.6 85.7   MCH 27.2 27.2 27.2   MCHC 32.2* 32.5 31.7*   RDW 40.3 40.5 41.1   PLATELETCT 265 230 204   MPV 9.5 9.3 9.7       Recent Labs     08/04/23  0206 08/04/23  0628 08/05/23  0106   SODIUM 139 139 140   POTASSIUM 3.9 3.8 3.4*   CHLORIDE 98 103 99   CO2 32 29 31   GLUCOSE 219* 186* 115*   BUN 32* 27* 13   CREATININE 0.80 0.63 0.55   CALCIUM 8.4 8.2* 8.3*                     Imaging  MR-HIP-W/O RIGHT   Final Result      1.  No evidence of pelvic or proximal femoral fracture.      2.  Moderate osteoarthritis of the right hip.      3.  Diffuse degenerative tearing of ossification of the acetabular labrum.      4.  Appearance of the proximal femur which has been described in the clinical syndrome of femoro-acetabular impingement.      5.  Degenerative change of the lumbar spine.      6.  Tendinopathy of the gluteus medius and minimus tendons.      7.  Similar changes seen involving the left hip as well.      US-RUQ   Final Result      Normal right upper quadrant ultrasound.      DX-HIP-COMPLETE - UNILATERAL 2+ RIGHT   Final Result      No radiographic evidence of acute traumatic injury.             Assessment/Plan  * Hyperglycemia- (present on admission)  Assessment & Plan  On arrival, blood sugar was 584, at risk for diabetic ketoacidosis  With fluids and insulin he has improved to the 100s  I counseled him extensively  Repeat BMP in  a.m.  PT OT eval recommending SNF, referral placed    Hx of Clostridium difficile infection- (present on admission)  Assessment & Plan  Developed diarrhea and was diagnosed with C. difficile while at SNF on 7/23/2023  He continues to have some abdominal pain however diarrhea has resolved  He completed 10 days of oral vancomycin, diarrhea has resolved    Right hip pain- (present on admission)  Assessment & Plan  He had a hard fall at home about 1 week ago and has been having difficulty walking due to pain in the right hip ever since  X-ray of the hip did not show evidence for fracture  Right hip MRI showed no evidence of fracture  PT OT recommending SNF    AP (abdominal pain)- (present on admission)  Assessment & Plan  Constant left sided abdominal pain/point tenderness without associated nausea vomiting or changes in bowel habits.    He had an elevated AST and alk phos however right upper quadrant ultrasound was negative arguing against cholecystitis  Lipase was normal   States constant for 3 months, worse with eating  UA showed markedly elevated glucose however no evidence of infection however he may still have urinary retention  Bladder scan w 120, no e/o retention  Began treatment for PUD, continue GI cocktail and Pepcid   he plans to follow-up with GI as an outpatient-we discussed this in detail and I reinforced the importance of close follow-up appointment to complete his work-up  Avoid narcotics  Repeat labs in a.m.    History of osteomyelitis of right hand (HCC)- (present on admission)  Assessment & Plan  Finished a course of intravenous antibiotics with Ancef while at SNF and subsequently was discharged home    Dyslipidemia- (present on admission)  Assessment & Plan  Most recently, total cholesterol 220,   He does not take a statin  Monitor    Diabetes mellitus type 2, insulin dependent, poorly controlled with marked hyperglycemia (HCC)- (present on admission)  Assessment & Plan  With marked  hyperglycemia  Last glycated hemoglobin was 15.2%!!  With hydration and insulin his sugars have improved to the 100s  He will need both long and short acting, I discussed this with him in detail  Continue Lantus units twice daily  Sliding scale  Hypoglycemia protocol  DC NS    Noncompliance with medications- (present on admission)  Assessment & Plan  Counseling provided         VTE prophylaxis: Xarelto    I have performed a physical exam and reviewed and updated ROS and Plan today (8/6/2023). In review of yesterday's note (8/5/2023), there are no changes except as documented above.

## 2023-08-06 NOTE — PROGRESS NOTES
Patient laying in bed awake. Patient using urinal currently. No needs at this time.     0313- Patient appears to be sleeping comfortably, easily aroused.

## 2023-08-06 NOTE — CARE PLAN
The patient is Stable - Low risk of patient condition declining or worsening    Shift Goals  Clinical Goals: BS <150  Patient Goals: rest and comfort  Family Goals: n/a    Progress made toward(s) clinical / shift goals:  insuline given as needed  Problem: Pain - Standard  Goal: Alleviation of pain or a reduction in pain to the patient’s comfort goal  Outcome: Progressing       Patient is not progressing towards the following goals:

## 2023-08-07 DIAGNOSIS — E11.65 UNCONTROLLED TYPE 2 DIABETES MELLITUS WITH HYPERGLYCEMIA (HCC): ICD-10-CM

## 2023-08-07 DIAGNOSIS — E11.10 DIABETIC KETOACIDOSIS WITHOUT COMA ASSOCIATED WITH TYPE 2 DIABETES MELLITUS (HCC): ICD-10-CM

## 2023-08-07 PROCEDURE — 99239 HOSP IP/OBS DSCHRG MGMT >30: CPT | Performed by: INTERNAL MEDICINE

## 2023-08-07 RX ORDER — INSULIN LISPRO 100 [IU]/ML
3-14 INJECTION, SOLUTION INTRAVENOUS; SUBCUTANEOUS
Qty: 12 ML | Refills: 3 | Status: SHIPPED | OUTPATIENT
Start: 2023-08-07 | End: 2023-08-31 | Stop reason: SDUPTHER

## 2023-08-07 RX ORDER — INSULIN GLARGINE 100 [IU]/ML
20 INJECTION, SOLUTION SUBCUTANEOUS 2 TIMES DAILY
Qty: 15 ML | Refills: 3 | Status: SHIPPED | OUTPATIENT
Start: 2023-08-07 | End: 2024-01-26

## 2023-08-07 NOTE — PROGRESS NOTES
1800 Pt was demanding to speak to the doctor because he wanted to be discharged and go home. Unit clerk answered the call light and stated his DrOlga Has left already today. Pt started yelling at the unit clerk. This nurse and Yanet went to talk to the patient. Patient stated that he is unhappy with his care. We are all lying to him and we aren't treating what his problem is. We tried to explain that he is not being held here against his will. He has the right to leave whenever he wants and there is a doctor in the hospital. We just want to make sure it is a safe discharge but he wouldn't listen.The patient kept stating that he was told there is no doctor here. Patient would not listen to us and kept saying there isn't a doctor here.  That he is being lied to. He was advised we would contact the on call Doctor to come and talk to him. The patient then stated I am being lied to there is a doctor here. Dr Kelley had already gone in to talk to this patient several times earlier today.     1830 Dr. Lauren called to see what was going on. I told him the patient stated he wanted to talk to a doctor and be discharged now or he was going to leave AMA. Dr Lauren stated he was busy admitting critical patients and would try to come talk to him or else the night  Would come see him.    Returned to patients room and advised him of what the  Said and stated  the doctor would come and talk to him when he could.  The patient said he just wanted his IV out and kept interrupting Yanet when She was trying to explain the risks to leaving against medical advice. The patient is A&O x4. Pt was asked if he had a ride home and he stated that is none of your business. Tried to reassure pt that we wanted to make sure he could get home safely. Pt. Got dressed and was assisted with his belongings. He pressed the call light to tell us he was ready to  go. Yanet had removed his IV. Pt was brought out by wheelchair by Yanet and the charge  nurse. Pt was told he could return to the emergency room at any time. This nurse notified Leonidas Garsia that the patient left against medical advice.

## 2023-08-07 NOTE — DISCHARGE SUMMARY
Discharge Summary    CHIEF COMPLAINT ON ADMISSION  Chief Complaint   Patient presents with    Abdominal Pain     LUQ pain x months worse the last day     GLF     1 week ago, broke toilet tank, R hip pain since fall    Urinary Frequency     Pt c/o thirst and urinary frequency hx of diabetes        Reason for Admission  EMS     Admission Date  8/3/2023    CODE STATUS  Full    HPI & HOSPITAL COURSE  This is a 58-year-old male with a history of poorly controlled type 2 diabetes, medical noncompliance, recent right thumb osteomyelitis status post 6 weeks of IV antibiotic treatment at SNF who was recently discharged home and had a fall 1 week ago.  Since then, he has had progressively worsening right hip pain and presented to the hospital with 24 hours of severe left-sided abdominal pain.  He was found to have marked hyperglycemia with glucose greater than 500 associated with metabolic acidosis concerning for early DKA.  He was hospitalized for IV fluids, glucose control and further work-up of his abdominal and hip pain.  He did undergo extensive work-up for his abdominal pain including right upper quadrant ultrasound which was negative for acute abnormality.  Lipase was normal.  Initially he had mild ALT elevation and some alk phos elevation however these trended down to the normal range.  He continued to have left-sided discomfort.  He was checked for urinary retention however her postvoid residual was only 120 cc.  He had been recently diagnosed with CHF while at a skilled nursing facility however he completed 10 days of therapy with oral vancomycin and his diarrhea subsided, therefore this was not thought to be causing his abdominal discomfort.  He has had a history of GERD therefore gastritis versus esophagitis versus peptic ulcer was considered.  No evidence of bleeding from these lesions if they were to be present therefore he was started on empiric treatment with Prilosec and was started on symptomatic management  with GI cocktail.  These did not resolve his discomfort.      Overall, the pain in his abdomen have been present for greater than 3 months.  He had been scheduled to see outpatient GI for an endoscopy however he missed this procedure due to being hospitalized for thumb osteomyelitis.  He was encouraged to reschedule this outpatient visit with GI in order to rediscuss the potential indication for EGD to further evaluate his abdominal discomfort.  He had no acute or life-threatening causes for his abdominal pain therefore he was determined to be stable to have this continued to be worked up as an outpatient.  The patient became very frustrated when not offered an inpatient EGD.  Despite multiple attempts by myself as well as his nursing care team to meet the patient as needed while still providing adequate inpatient care, he became increasingly frustrated.  He was evaluated by PT and OT and they recommended that he go to skilled nursing facility.  While waiting for authorization for skilled nursing facility he decided that he would rather leave AGAINST MEDICAL ADVICE.    He was again encouraged to pursue outpatient GI follow-up as previously recommended    Therefore, he is discharged in guarded and stable condition against medcial advice.    The patient met 2-midnight criteria for an inpatient stay at the time of discharge.    Discharge Date  8/6/2023    FOLLOW UP ITEMS POST DISCHARGE  Follow-up with outpatient GI    DISCHARGE DIAGNOSES  Principal Problem:    Hyperglycemia (POA: Yes)  Active Problems:    Noncompliance with medications (POA: Yes)    Diabetes mellitus type 2, insulin dependent, poorly controlled with marked hyperglycemia (HCC) (POA: Yes)    Dyslipidemia (POA: Yes)    History of osteomyelitis of right hand (HCC) (POA: Yes)    AP (abdominal pain) (POA: Yes)    Right hip pain (POA: Yes)    Hx of Clostridium difficile infection (POA: Yes)  Resolved Problems:    * No resolved hospital problems. *      FOLLOW  UP  No future appointments.  No follow-up provider specified.    MEDICATIONS ON DISCHARGE     Medication List        ASK your doctor about these medications        Instructions   ANCEF IV   Infuse  into a venous catheter. 6 week course at [facility]     insulin GLARGINE 100 UNIT/ML Soln  Commonly known as: Lantus,Semglee   Inject 20 Units under the skin 2 times a day.  Dose: 20 Units     insulin lispro 100 UNIT/ML Sopn injection PEN  Commonly known as: HumaLOG/AdmeLOG   Inject 3-14 Units under the skin 4 Times a Day,Before Meals and at Bedtime. 70   - 150  mg/dL =    0 Units  151 - 200  mg/dL =    3 Units  201 - 250  mg/dL =    4 Units  251 - 300  mg/dL  =   7 Units  301 - 350  mg/dL  =   10 Units  351 - 400 mg/dL   =   12 Units  Over 400 mg/dL   =   14 Units  Dose: 3-14 Units     ondansetron 4 MG Tbdp  Commonly known as: Zofran ODT   Take 4 mg by mouth 2 times a day as needed for Nausea/Vomiting.  Dose: 4 mg     One-A-Day Mens Tabs   Take 1 Tablet by mouth every day.  Dose: 1 Tablet     oxyCODONE immediate-release 5 MG Tabs  Commonly known as: Roxicodone   Take 5 mg by mouth every 8 hours as needed for Severe Pain.  Dose: 5 mg     vancomycin 50 mg/mL 50 mg/mL Soln   Take 125 mg by mouth every 6 hours as needed. Started 3 week course for C-DIFF on 7/23/23  Dose: 125 mg              Allergies  No Known Allergies    DIET  No orders of the defined types were placed in this encounter.      ACTIVITY  As tolerated.  Weight bearing as tolerated    CONSULTATIONS  None    PROCEDURES  None    LABORATORY  Lab Results   Component Value Date    SODIUM 140 08/05/2023    POTASSIUM 3.4 (L) 08/05/2023    CHLORIDE 99 08/05/2023    CO2 31 08/05/2023    GLUCOSE 115 (H) 08/05/2023    BUN 13 08/05/2023    CREATININE 0.55 08/05/2023        Lab Results   Component Value Date    WBC 7.5 08/05/2023    HEMOGLOBIN 13.9 (L) 08/05/2023    HEMATOCRIT 43.8 08/05/2023    PLATELETCT 204 08/05/2023        Total time of the discharge process exceeds 38  minutes.

## 2023-08-07 NOTE — TELEPHONE ENCOUNTER
Received request via: Patient    Was the patient seen in the last year in this department? Yes  LOV : 8/1/2023   Does the patient have an active prescription (recently filled or refills available) for medication(s) requested? No    Does the patient have MCC Plus and need 100 day supply (blood pressure, diabetes and cholesterol meds only)? Patient does not have SCP

## 2023-08-07 NOTE — PROGRESS NOTES
I have been paged by patient nurse because the patient wants to leave against medical advise.  This is a 58M, PMHx DM, osteomyelitis, & Hx of C Diff admitted for hyperglycemia with a plan to be discharged to skilled nursing facility. According to nursing staff, the patient has been alert and oriented x4 has been askingand to be discharged /threatening to leave against medical advice multiple times today.  When I arrived to patient's room, he has already left.  I called the patient at [phone] to discuss his decision and needs.  The patient expressed that he is upset because he feels he has not been getting better and the staff are not cooperating with him he reports that he has been asking for soup and not getting it.  He reports that he has been having discomfort with eating food and drinking water and wanted to see a GI doctor and that has not happened.  Per chart review and extensive work-up has been done for his abdominal pain with a plan for GI referral. I explained that he will need further care in a skilled nursing facility.  However, despite extensive counseling the patient insists on leaving the hospital.  He said he will call 911 or call a cab to get back to the hospital if he has started to feel worse.

## 2023-08-07 NOTE — PROGRESS NOTES
"Informed by charge RN that unit clerk just responded to the patient's call light via Responder 5 phone and pt was screaming through the phone. Responded to call light in person with primary RN Yuly. Introduced self to patient as unit supervisor. Pt very upset and demanding to see a physician, stating \"you told me that there was not a doctor here.\" Reassured patient that neither myself nor Yuly ever stated this, and addressed that while his primary physician Dr. Kelley is no longer on site, there is an on call physician covering his care at this time. Pt taking in circles continues to remain fixated that he was informed there was no doctor on site. Pt demands to be discharged tonight \"or I'll leave!\" Again attempted to educate pt that we will relay his requests to the doctor but it is our on call physician and not Dr. Kelley, and that the recommendation by our physical and occupational therapists was that he discharge to a skilled nursing facility. Again discussed with patient that we will relay his requests to our doctor, but that in order to safely discharge we need to ensure he has a safe plan otherwise it will be against medical advice. Pt states \"oh so you're kicking me to the curb? Fine! I want to leave.\" Reassured patient that is not our intent at all and attempted to educate patient on benefits of staying but pt frequently interrupting this RNs attempts. Pt then stating that we have not been managing anything he is here for, including his stomach. When asked to elaborate on why he feels this way, pt states he is still in a lot of pain and we have been \"doing nothing\" for his pain. Yuly CASPER confirmed that she has provided two doses of Oxycodone today and a GI cocktail but pt continues to insist we're \"doing nothing\" and he wants to leave. Discussed with patient that we will relay his request to the doctor, to which he interrupted \"oh so there is a doctor. I'm being lied to.\"     Yuly CASPER then spoke with  " "Leonidas and relayed the patient's requests and concerns. Dr. Laruen familiar with patient and states he will try to come down to see the patient tonight but if he is unable he will have the overnight hospitalist speak to the patient.     This RN & Yuly RN then relayed this information to the patient and he again stated \"oh so there is a doctor.\" Pt upset that the doctor will not come down right now and states he just wants to leave. Pt requesting IV be removed to which this RN complied. Attempted to further explain risks of leaving against medical advice to the patient but pt repeatedly interrupting and not listening to explanation. Pt A&Ox4. Pt asked if he has a ride home to which he responded \"that's none of your business.\" Reassured patient that we are just trying to assist him to make sure he gets home safely. Pt assisted with packing up personal belongings and allowed privacy to get dressed. Shortly after, pt rang call light to request to be wheeled to the exit. Patient was wheeled out via wheelchair by this RN and charge RN Lexx. Pt called for a cab himself via his personal cell phone at the emergency department entrance. Patient was encouraged to return to the emergency department at any time. Primary RN Yuly notified Dr. Lauren via voalte that the patient did not want to wait for physician and left against medical advice.   "

## 2023-08-08 ENCOUNTER — TELEPHONE (OUTPATIENT)
Dept: MEDICAL GROUP | Facility: LAB | Age: 58
End: 2023-08-08
Payer: COMMERCIAL

## 2023-08-09 ENCOUNTER — TELEPHONE (OUTPATIENT)
Dept: MEDICAL GROUP | Facility: LAB | Age: 58
End: 2023-08-09

## 2023-08-09 NOTE — TELEPHONE ENCOUNTER
1. Name: Christoph diop    Call Back Number: [phone]      How would the patient prefer to be contacted with a response: Phone call OK to leave a detailed message    2. Patient needs referral for [facility]/physical therapy, and is needing his syringe tips for insulin refilled as he is out.     3. Clinical Reason for Request: refferal and refill     4. Specialty & Provider Name/Lab/Imaging Location Preference: [facility]     5. Is appointment scheduled for requested order/referral: no    Patient was informed they may receive a return phone call from our office with any additional questions before processing this request.

## 2023-08-17 ENCOUNTER — TELEPHONE (OUTPATIENT)
Dept: MEDICAL GROUP | Facility: LAB | Age: 58
End: 2023-08-17
Payer: COMMERCIAL

## 2023-08-31 ENCOUNTER — OFFICE VISIT (OUTPATIENT)
Dept: MEDICAL GROUP | Facility: LAB | Age: 58
End: 2023-08-31
Payer: COMMERCIAL

## 2023-08-31 VITALS
RESPIRATION RATE: 14 BRPM | HEIGHT: 72 IN | WEIGHT: 170.4 LBS | OXYGEN SATURATION: 96 % | TEMPERATURE: 98.2 F | SYSTOLIC BLOOD PRESSURE: 114 MMHG | BODY MASS INDEX: 23.08 KG/M2 | HEART RATE: 76 BPM | DIASTOLIC BLOOD PRESSURE: 46 MMHG

## 2023-08-31 DIAGNOSIS — Z09 HOSPITAL DISCHARGE FOLLOW-UP: ICD-10-CM

## 2023-08-31 DIAGNOSIS — E78.5 DYSLIPIDEMIA: ICD-10-CM

## 2023-08-31 DIAGNOSIS — Z23 NEED FOR VACCINATION: ICD-10-CM

## 2023-08-31 DIAGNOSIS — D64.9 ANEMIA, UNSPECIFIED TYPE: ICD-10-CM

## 2023-08-31 DIAGNOSIS — E11.65 UNCONTROLLED TYPE 2 DIABETES MELLITUS WITH HYPERGLYCEMIA (HCC): ICD-10-CM

## 2023-08-31 DIAGNOSIS — E87.1 HYPONATREMIA: ICD-10-CM

## 2023-08-31 PROCEDURE — 90677 PCV20 VACCINE IM: CPT | Performed by: NURSE PRACTITIONER

## 2023-08-31 PROCEDURE — 3074F SYST BP LT 130 MM HG: CPT | Performed by: NURSE PRACTITIONER

## 2023-08-31 PROCEDURE — 3078F DIAST BP <80 MM HG: CPT | Performed by: NURSE PRACTITIONER

## 2023-08-31 PROCEDURE — 90471 IMMUNIZATION ADMIN: CPT | Performed by: NURSE PRACTITIONER

## 2023-08-31 PROCEDURE — 99215 OFFICE O/P EST HI 40 MIN: CPT | Mod: 25 | Performed by: NURSE PRACTITIONER

## 2023-08-31 RX ORDER — INSULIN LISPRO 100 [IU]/ML
3-14 INJECTION, SOLUTION INTRAVENOUS; SUBCUTANEOUS
Qty: 12 ML | Refills: 3 | Status: SHIPPED | OUTPATIENT
Start: 2023-08-31 | End: 2024-01-26

## 2023-08-31 RX ORDER — ASPIRIN 81 MG/1
81 TABLET ORAL DAILY
Qty: 90 TABLET | Refills: 3 | Status: SHIPPED | OUTPATIENT
Start: 2023-08-31 | End: 2024-01-26

## 2023-08-31 ASSESSMENT — FIBROSIS 4 INDEX: FIB4 SCORE: 1.11

## 2023-08-31 NOTE — ASSESSMENT & PLAN NOTE
Chronic condition. Due for labs. Patient currently managing with lifestyle modifications. The 10-year ASCVD risk score (Ana PATRICIO, et al., 2019) is: 9%.  Denies chest pain, shortness of breath, heart palpitations.    Lab Results   Component Value Date/Time    CHOLSTRLTOT 226 (H) 10/07/2022 08:15 AM     (H) 10/07/2022 08:15 AM    HDL 88 10/07/2022 08:15 AM    TRIGLYCERIDE 70 10/07/2022 08:15 AM

## 2023-08-31 NOTE — PROGRESS NOTES
Subjective:     CC:   Chief Complaint   Patient presents with    Hospital Follow-up    Finger Pain     Swollen R thumb      HPI:   Christoph presents today with the following:    Hospital follow up  Patient has been hospitalized several times over the last month with poorly controlled type 2 diabetes, medical noncompliance, recent right thumb osteomyelitis status post IV antibiotic treatment at SNF, recent fall with subsequent hip pain. He was in a SNF for several weeks, since discharge has been seen by HH for PT/OT. Is feeling better overall, although still weak in his legs. He did have a follow up with ortho and was told that his thumb swelling should decrease but mobility will likely remain the same. Denies fever, chills, N/V.    Uncontrolled type 2 diabetes mellitus with hyperglycemia (HCC)  Uncontrolled. Currently taking Lantus 20 units BID as directed. Was also prescribed sliding scale insulin, but was unable to  this prescription. Discussed CGM which patient is also interested in. Is not currently established with endocrinology.   Denies side effects or hypoglycemic events.  He is monitoring blood sugar regularly at home. Usually FBS are around 150-180s.   Reports diet is fair  He is not exercising regularly.  Last eye exam: due  Denies polyuria, polydipsia, blurred vision, or neuropathies.    Dyslipidemia  Chronic condition. Due for labs. Patient currently managing with lifestyle modifications. The 10-year ASCVD risk score (Lincoln DK, et al., 2019) is: 9%.  Denies chest pain, shortness of breath, heart palpitations.    Lab Results   Component Value Date/Time    CHOLSTRLTOT 226 (H) 10/07/2022 08:15 AM     (H) 10/07/2022 08:15 AM    HDL 88 10/07/2022 08:15 AM    TRIGLYCERIDE 70 10/07/2022 08:15 AM      ROS:   As documented in history of present illness above    Objective:     Exam: /46 (BP Location: Right arm, Patient Position: Sitting, BP Cuff Size: Adult)   Pulse 76   Temp 36.8 °C (98.2 °F)    Resp 14   Ht 1.829 m (6')   Wt 77.3 kg (170 lb 6.4 oz)   SpO2 96%  Body mass index is 23.11 kg/m².    Constitutional: Alert, no distress, well-groomed.  Skin: Warm, dry, good turgor, no rashes in visible areas.  Eye: Equal, round and reactive, conjunctiva clear, lids normal.  ENMT: Lips without lesions, good dentition, moist mucous membranes.  Neck: Trachea midline, no masses, no thyromegaly.  Respiratory: Unlabored respiratory effort, no cough.  MSK: Normal gait, moves all extremities.  Neuro: Grossly non-focal.   Psych: Alert and oriented x3, normal affect and mood.    Assessment & Plan:     58 y.o. male with the following -     1. Hospital discharge follow-up  Reviewed the patient's hospitalization in depth including imaging, lab work and discharge summary. Answered all questions.    1. Uncontrolled type 2 diabetes mellitus with hyperglycemia (HCC)  Diabetes currently uncontrolled.  Patient has been noncompliant with medications in the past, discussed CGM which patient is interested in. Also discussed referral to endocrinology as well as the diabetes nurse educator who I believe is in the same office. Patient to continue medication as prescribed.  Labwork as indicated.  POCT retinal screening attempted today in office, but unable to obtain; referral placed to ophthalmology. Discussed importance of diet control, carbohydrates less than 100 g/day.  Patient to limit fruit intake, portion control discussed.  Follow-up as directed.  - insulin lispro 100 UNIT/ML SC SOPN injection PEN; Inject 3-14 Units under the skin 4 Times a Day,Before Meals and at Bedtime. 70   - 150  mg/dL =    0 Units 151 - 200  mg/dL =    3 Units 201 - 250  mg/dL =    4 Units 251 - 300  mg/dL  =   7 Units 301 - 350  mg/dL  =   10 Units 351 - 400 mg/dL   =   12 Units Over 400 mg/dL   =   14 Units  Dispense: 12 mL; Refill: 3  - Referral to Endocrinology  - POCT Retinal Eye Exam  - Referral to Ophthalmology    2. Anemia, unspecified  type  Recheck labs.   - CBC WITH DIFFERENTIAL; Future    3. Hyponatremia  Recheck labs.  - Comp Metabolic Panel; Future    4. Dyslipidemia  Chronic condition. Discussed medication management which patient will consider in the future. Labs as indicated.  Continue lifestyle modifications.  - aspirin 81 MG EC tablet; Take 1 Tablet by mouth every day.  Dispense: 90 Tablet; Refill: 3  - Lipid Profile; Future    5. Need for vaccination  Patient was agreeable to receiving the pneumonia vaccine in clinic today after discussion of potential risks, benefits, and side effects. Vaccine administered without adverse effects.  - Pneumococcal Conjugate Vaccine 20-Valent (19 yrs+)    My total time spent caring for the patient on the day of the encounter was 51 minutes.   This does not include time spent on separately billable procedures/tests.

## 2023-08-31 NOTE — ASSESSMENT & PLAN NOTE
Uncontrolled. Currently taking Lantus 20 units BID as directed. Was also prescribed sliding scale insulin, but was unable to  this prescription. Discussed CGM which patient is also interested in. Is not currently established with endocrinology.   Denies side effects or hypoglycemic events.  He is monitoring blood sugar regularly at home. Usually FBS are around 150-180s.   Reports diet is fair  He is not exercising regularly.  Last eye exam: due  Denies polyuria, polydipsia, blurred vision, or neuropathies.

## 2024-01-26 ENCOUNTER — APPOINTMENT (OUTPATIENT)
Dept: RADIOLOGY | Facility: MEDICAL CENTER | Age: 59
DRG: 690 | End: 2024-01-26
Attending: EMERGENCY MEDICINE
Payer: COMMERCIAL

## 2024-01-26 ENCOUNTER — HOSPITAL ENCOUNTER (INPATIENT)
Facility: MEDICAL CENTER | Age: 59
LOS: 2 days | DRG: 690 | End: 2024-01-28
Attending: EMERGENCY MEDICINE | Admitting: HOSPITALIST
Payer: COMMERCIAL

## 2024-01-26 DIAGNOSIS — R11.2 NAUSEA AND VOMITING, UNSPECIFIED VOMITING TYPE: ICD-10-CM

## 2024-01-26 DIAGNOSIS — R10.12 LEFT UPPER QUADRANT ABDOMINAL PAIN: ICD-10-CM

## 2024-01-26 DIAGNOSIS — K59.00 CONSTIPATION, UNSPECIFIED CONSTIPATION TYPE: ICD-10-CM

## 2024-01-26 DIAGNOSIS — E11.65 TYPE 2 DIABETES MELLITUS WITH HYPERGLYCEMIA, UNSPECIFIED WHETHER LONG TERM INSULIN USE (HCC): ICD-10-CM

## 2024-01-26 DIAGNOSIS — G89.29 CHRONIC MIDLINE THORACIC BACK PAIN: ICD-10-CM

## 2024-01-26 DIAGNOSIS — R10.13 EPIGASTRIC ABDOMINAL PAIN: ICD-10-CM

## 2024-01-26 DIAGNOSIS — M54.6 CHRONIC MIDLINE THORACIC BACK PAIN: ICD-10-CM

## 2024-01-26 DIAGNOSIS — I95.9 HYPOTENSIVE EPISODE: ICD-10-CM

## 2024-01-26 PROBLEM — R33.9 URINARY RETENTION: Status: ACTIVE | Noted: 2024-01-26

## 2024-01-26 PROBLEM — R82.90 ABNORMAL URINALYSIS: Status: ACTIVE | Noted: 2024-01-26

## 2024-01-26 PROBLEM — E11.621 DIABETIC TOE ULCER (HCC): Status: ACTIVE | Noted: 2024-01-26

## 2024-01-26 PROBLEM — L97.509 DIABETIC TOE ULCER (HCC): Status: ACTIVE | Noted: 2024-01-26

## 2024-01-26 LAB
ALBUMIN SERPL BCP-MCNC: 3.4 G/DL (ref 3.2–4.9)
ALBUMIN SERPL BCP-MCNC: 3.5 G/DL (ref 3.2–4.9)
ALBUMIN/GLOB SERPL: 1.3 G/DL
ALBUMIN/GLOB SERPL: 1.3 G/DL
ALP SERPL-CCNC: 75 U/L (ref 30–99)
ALP SERPL-CCNC: 81 U/L (ref 30–99)
ALT SERPL-CCNC: 12 U/L (ref 2–50)
ALT SERPL-CCNC: 12 U/L (ref 2–50)
AMPHET UR QL SCN: NEGATIVE
AMPHET UR QL SCN: NEGATIVE
ANION GAP SERPL CALC-SCNC: 11 MMOL/L (ref 7–16)
ANION GAP SERPL CALC-SCNC: 9 MMOL/L (ref 7–16)
APPEARANCE UR: CLEAR
AST SERPL-CCNC: 10 U/L (ref 12–45)
AST SERPL-CCNC: 12 U/L (ref 12–45)
BACTERIA #/AREA URNS HPF: ABNORMAL /HPF
BARBITURATES UR QL SCN: NEGATIVE
BARBITURATES UR QL SCN: NEGATIVE
BASOPHILS # BLD AUTO: 0.1 % (ref 0–1.8)
BASOPHILS # BLD AUTO: 0.1 % (ref 0–1.8)
BASOPHILS # BLD: 0.01 K/UL (ref 0–0.12)
BASOPHILS # BLD: 0.01 K/UL (ref 0–0.12)
BENZODIAZ UR QL SCN: NEGATIVE
BENZODIAZ UR QL SCN: NEGATIVE
BILIRUB SERPL-MCNC: 0.4 MG/DL (ref 0.1–1.5)
BILIRUB SERPL-MCNC: 0.4 MG/DL (ref 0.1–1.5)
BILIRUB UR QL STRIP.AUTO: NEGATIVE
BUN SERPL-MCNC: 13 MG/DL (ref 8–22)
BUN SERPL-MCNC: 13 MG/DL (ref 8–22)
BZE UR QL SCN: NEGATIVE
BZE UR QL SCN: NEGATIVE
CALCIUM ALBUM COR SERPL-MCNC: 8.8 MG/DL (ref 8.5–10.5)
CALCIUM ALBUM COR SERPL-MCNC: 8.9 MG/DL (ref 8.5–10.5)
CALCIUM SERPL-MCNC: 8.3 MG/DL (ref 8.4–10.2)
CALCIUM SERPL-MCNC: 8.5 MG/DL (ref 8.4–10.2)
CANNABINOIDS UR QL SCN: NEGATIVE
CANNABINOIDS UR QL SCN: NEGATIVE
CHLORIDE SERPL-SCNC: 94 MMOL/L (ref 96–112)
CHLORIDE SERPL-SCNC: 94 MMOL/L (ref 96–112)
CO2 SERPL-SCNC: 26 MMOL/L (ref 20–33)
CO2 SERPL-SCNC: 31 MMOL/L (ref 20–33)
COLOR UR: ABNORMAL
CORTIS SERPL-MCNC: 12.9 UG/DL (ref 0–23)
CREAT SERPL-MCNC: 0.53 MG/DL (ref 0.5–1.4)
CREAT SERPL-MCNC: 0.65 MG/DL (ref 0.5–1.4)
CRP SERPL HS-MCNC: <0.3 MG/DL (ref 0–0.75)
EKG IMPRESSION: NORMAL
EOSINOPHIL # BLD AUTO: 0.04 K/UL (ref 0–0.51)
EOSINOPHIL # BLD AUTO: 0.05 K/UL (ref 0–0.51)
EOSINOPHIL NFR BLD: 0.5 % (ref 0–6.9)
EOSINOPHIL NFR BLD: 0.7 % (ref 0–6.9)
EPI CELLS #/AREA URNS HPF: ABNORMAL /HPF
ERYTHROCYTE [DISTWIDTH] IN BLOOD BY AUTOMATED COUNT: 36.5 FL (ref 35.9–50)
ERYTHROCYTE [DISTWIDTH] IN BLOOD BY AUTOMATED COUNT: 38.3 FL (ref 35.9–50)
ETHANOL BLD-MCNC: <10.1 MG/DL
ETHANOL BLD-MCNC: <10.1 MG/DL
FENTANYL UR QL: NEGATIVE
FENTANYL UR QL: NEGATIVE
GFR SERPLBLD CREATININE-BSD FMLA CKD-EPI: 108 ML/MIN/1.73 M 2
GFR SERPLBLD CREATININE-BSD FMLA CKD-EPI: 115 ML/MIN/1.73 M 2
GLOBULIN SER CALC-MCNC: 2.7 G/DL (ref 1.9–3.5)
GLOBULIN SER CALC-MCNC: 2.8 G/DL (ref 1.9–3.5)
GLUCOSE BLD STRIP.AUTO-MCNC: 192 MG/DL (ref 65–99)
GLUCOSE BLD STRIP.AUTO-MCNC: 220 MG/DL (ref 65–99)
GLUCOSE SERPL-MCNC: 221 MG/DL (ref 65–99)
GLUCOSE SERPL-MCNC: 286 MG/DL (ref 65–99)
GLUCOSE UR STRIP.AUTO-MCNC: 500 MG/DL
GRAN CASTS #/AREA URNS LPF: ABNORMAL /LPF
HCT VFR BLD AUTO: 37.5 % (ref 42–52)
HCT VFR BLD AUTO: 38.8 % (ref 42–52)
HGB BLD-MCNC: 13.2 G/DL (ref 14–18)
HGB BLD-MCNC: 13.4 G/DL (ref 14–18)
IMM GRANULOCYTES # BLD AUTO: 0.02 K/UL (ref 0–0.11)
IMM GRANULOCYTES # BLD AUTO: 0.03 K/UL (ref 0–0.11)
IMM GRANULOCYTES NFR BLD AUTO: 0.3 % (ref 0–0.9)
IMM GRANULOCYTES NFR BLD AUTO: 0.3 % (ref 0–0.9)
KETONES UR STRIP.AUTO-MCNC: NEGATIVE MG/DL
LACTATE SERPL-SCNC: 1 MMOL/L (ref 0.5–2)
LACTATE SERPL-SCNC: 1.4 MMOL/L (ref 0.5–2)
LACTATE SERPL-SCNC: 1.6 MMOL/L (ref 0.5–2)
LEUKOCYTE ESTERASE UR QL STRIP.AUTO: NEGATIVE
LIPASE SERPL-CCNC: 12 U/L (ref 11–82)
LYMPHOCYTES # BLD AUTO: 1.03 K/UL (ref 1–4.8)
LYMPHOCYTES # BLD AUTO: 1.23 K/UL (ref 1–4.8)
LYMPHOCYTES NFR BLD: 14.1 % (ref 22–41)
LYMPHOCYTES NFR BLD: 14.9 % (ref 22–41)
MAGNESIUM SERPL-MCNC: 1.7 MG/DL (ref 1.5–2.5)
MCH RBC QN AUTO: 27.9 PG (ref 27–33)
MCH RBC QN AUTO: 28.4 PG (ref 27–33)
MCHC RBC AUTO-ENTMCNC: 34 G/DL (ref 32.3–36.5)
MCHC RBC AUTO-ENTMCNC: 35.7 G/DL (ref 32.3–36.5)
MCV RBC AUTO: 79.4 FL (ref 81.4–97.8)
MCV RBC AUTO: 82 FL (ref 81.4–97.8)
METHADONE UR QL SCN: NEGATIVE
METHADONE UR QL SCN: NEGATIVE
MICRO URNS: ABNORMAL
MONOCYTES # BLD AUTO: 0.36 K/UL (ref 0–0.85)
MONOCYTES # BLD AUTO: 0.42 K/UL (ref 0–0.85)
MONOCYTES NFR BLD AUTO: 4.8 % (ref 0–13.4)
MONOCYTES NFR BLD AUTO: 5.2 % (ref 0–13.4)
NEUTROPHILS # BLD AUTO: 5.46 K/UL (ref 1.82–7.42)
NEUTROPHILS # BLD AUTO: 7.02 K/UL (ref 1.82–7.42)
NEUTROPHILS NFR BLD: 78.8 % (ref 44–72)
NEUTROPHILS NFR BLD: 80.2 % (ref 44–72)
NITRITE UR QL STRIP.AUTO: NEGATIVE
NRBC # BLD AUTO: 0 K/UL
NRBC # BLD AUTO: 0 K/UL
NRBC BLD-RTO: 0 /100 WBC (ref 0–0.2)
NRBC BLD-RTO: 0 /100 WBC (ref 0–0.2)
OPIATES UR QL SCN: NEGATIVE
OPIATES UR QL SCN: NEGATIVE
OXYCODONE UR QL SCN: NEGATIVE
OXYCODONE UR QL SCN: NEGATIVE
PCP UR QL SCN: NEGATIVE
PCP UR QL SCN: NEGATIVE
PH UR STRIP.AUTO: 7 [PH] (ref 5–8)
PHOSPHATE SERPL-MCNC: 2.6 MG/DL (ref 2.5–4.5)
PLATELET # BLD AUTO: 234 K/UL (ref 164–446)
PLATELET # BLD AUTO: 260 K/UL (ref 164–446)
PMV BLD AUTO: 8.7 FL (ref 9–12.9)
PMV BLD AUTO: 8.8 FL (ref 9–12.9)
POTASSIUM SERPL-SCNC: 3.4 MMOL/L (ref 3.6–5.5)
POTASSIUM SERPL-SCNC: 3.7 MMOL/L (ref 3.6–5.5)
PROCALCITONIN SERPL-MCNC: 0.07 NG/ML
PROPOXYPH UR QL SCN: NEGATIVE
PROPOXYPH UR QL SCN: NEGATIVE
PROT SERPL-MCNC: 6.1 G/DL (ref 6–8.2)
PROT SERPL-MCNC: 6.3 G/DL (ref 6–8.2)
PROT UR QL STRIP: 30 MG/DL
RBC # BLD AUTO: 4.72 M/UL (ref 4.7–6.1)
RBC # BLD AUTO: 4.73 M/UL (ref 4.7–6.1)
RBC # URNS HPF: ABNORMAL /HPF
RBC UR QL AUTO: ABNORMAL
SODIUM SERPL-SCNC: 131 MMOL/L (ref 135–145)
SODIUM SERPL-SCNC: 134 MMOL/L (ref 135–145)
SP GR UR STRIP.AUTO: 1.01
TSH SERPL DL<=0.005 MIU/L-ACNC: 2.27 UIU/ML (ref 0.38–5.33)
UNIDENT CRYS URNS QL MICRO: ABNORMAL /HPF
WBC # BLD AUTO: 6.9 K/UL (ref 4.8–10.8)
WBC # BLD AUTO: 8.8 K/UL (ref 4.8–10.8)
WBC #/AREA URNS HPF: ABNORMAL /HPF

## 2024-01-26 PROCEDURE — 84443 ASSAY THYROID STIM HORMONE: CPT

## 2024-01-26 PROCEDURE — A9270 NON-COVERED ITEM OR SERVICE: HCPCS | Performed by: HOSPITALIST

## 2024-01-26 PROCEDURE — 96375 TX/PRO/DX INJ NEW DRUG ADDON: CPT

## 2024-01-26 PROCEDURE — 36415 COLL VENOUS BLD VENIPUNCTURE: CPT

## 2024-01-26 PROCEDURE — 303105 HCHG CATHETER EXTRA

## 2024-01-26 PROCEDURE — 94760 N-INVAS EAR/PLS OXIMETRY 1: CPT

## 2024-01-26 PROCEDURE — A9270 NON-COVERED ITEM OR SERVICE: HCPCS | Performed by: EMERGENCY MEDICINE

## 2024-01-26 PROCEDURE — 770020 HCHG ROOM/CARE - TELE (206)

## 2024-01-26 PROCEDURE — 84145 PROCALCITONIN (PCT): CPT

## 2024-01-26 PROCEDURE — 87040 BLOOD CULTURE FOR BACTERIA: CPT

## 2024-01-26 PROCEDURE — 86140 C-REACTIVE PROTEIN: CPT

## 2024-01-26 PROCEDURE — 83735 ASSAY OF MAGNESIUM: CPT

## 2024-01-26 PROCEDURE — 700111 HCHG RX REV CODE 636 W/ 250 OVERRIDE (IP): Mod: JZ | Performed by: HOSPITALIST

## 2024-01-26 PROCEDURE — 85025 COMPLETE CBC W/AUTO DIFF WBC: CPT

## 2024-01-26 PROCEDURE — 81001 URINALYSIS AUTO W/SCOPE: CPT

## 2024-01-26 PROCEDURE — 83605 ASSAY OF LACTIC ACID: CPT

## 2024-01-26 PROCEDURE — 700111 HCHG RX REV CODE 636 W/ 250 OVERRIDE (IP): Mod: JZ | Performed by: EMERGENCY MEDICINE

## 2024-01-26 PROCEDURE — 84100 ASSAY OF PHOSPHORUS: CPT

## 2024-01-26 PROCEDURE — 80053 COMPREHEN METABOLIC PANEL: CPT

## 2024-01-26 PROCEDURE — 99223 1ST HOSP IP/OBS HIGH 75: CPT | Performed by: HOSPITALIST

## 2024-01-26 PROCEDURE — 700102 HCHG RX REV CODE 250 W/ 637 OVERRIDE(OP): Performed by: EMERGENCY MEDICINE

## 2024-01-26 PROCEDURE — 93005 ELECTROCARDIOGRAM TRACING: CPT | Performed by: EMERGENCY MEDICINE

## 2024-01-26 PROCEDURE — 96374 THER/PROPH/DIAG INJ IV PUSH: CPT

## 2024-01-26 PROCEDURE — 82077 ASSAY SPEC XCP UR&BREATH IA: CPT

## 2024-01-26 PROCEDURE — 87186 SC STD MICRODIL/AGAR DIL: CPT

## 2024-01-26 PROCEDURE — 87077 CULTURE AEROBIC IDENTIFY: CPT

## 2024-01-26 PROCEDURE — 51702 INSERT TEMP BLADDER CATH: CPT

## 2024-01-26 PROCEDURE — 80307 DRUG TEST PRSMV CHEM ANLYZR: CPT

## 2024-01-26 PROCEDURE — 700105 HCHG RX REV CODE 258: Performed by: HOSPITALIST

## 2024-01-26 PROCEDURE — 700105 HCHG RX REV CODE 258: Performed by: EMERGENCY MEDICINE

## 2024-01-26 PROCEDURE — 87086 URINE CULTURE/COLONY COUNT: CPT

## 2024-01-26 PROCEDURE — 74176 CT ABD & PELVIS W/O CONTRAST: CPT

## 2024-01-26 PROCEDURE — 71045 X-RAY EXAM CHEST 1 VIEW: CPT

## 2024-01-26 PROCEDURE — 83690 ASSAY OF LIPASE: CPT

## 2024-01-26 PROCEDURE — 82533 TOTAL CORTISOL: CPT

## 2024-01-26 PROCEDURE — 700102 HCHG RX REV CODE 250 W/ 637 OVERRIDE(OP): Performed by: HOSPITALIST

## 2024-01-26 PROCEDURE — 74022 RADEX COMPL AQT ABD SERIES: CPT

## 2024-01-26 PROCEDURE — 82962 GLUCOSE BLOOD TEST: CPT | Mod: 91

## 2024-01-26 PROCEDURE — 99285 EMERGENCY DEPT VISIT HI MDM: CPT

## 2024-01-26 RX ORDER — PROMETHAZINE HYDROCHLORIDE 25 MG/1
12.5-25 TABLET ORAL EVERY 4 HOURS PRN
Status: DISCONTINUED | OUTPATIENT
Start: 2024-01-26 | End: 2024-01-28 | Stop reason: HOSPADM

## 2024-01-26 RX ORDER — ONDANSETRON 4 MG/1
4 TABLET, ORALLY DISINTEGRATING ORAL EVERY 6 HOURS PRN
Qty: 10 TABLET | Refills: 0 | Status: SHIPPED | OUTPATIENT
Start: 2024-01-26

## 2024-01-26 RX ORDER — DICYCLOMINE HCL 20 MG
10 TABLET ORAL
Status: DISCONTINUED | OUTPATIENT
Start: 2024-01-26 | End: 2024-01-27

## 2024-01-26 RX ORDER — PROMETHAZINE HYDROCHLORIDE 25 MG/1
12.5-25 SUPPOSITORY RECTAL EVERY 4 HOURS PRN
Status: DISCONTINUED | OUTPATIENT
Start: 2024-01-26 | End: 2024-01-28 | Stop reason: HOSPADM

## 2024-01-26 RX ORDER — AMOXICILLIN 250 MG
2 CAPSULE ORAL 2 TIMES DAILY
Status: DISCONTINUED | OUTPATIENT
Start: 2024-01-26 | End: 2024-01-27

## 2024-01-26 RX ORDER — INSULIN LISPRO 100 [IU]/ML
0.2 INJECTION, SOLUTION INTRAVENOUS; SUBCUTANEOUS
Status: DISCONTINUED | OUTPATIENT
Start: 2024-01-26 | End: 2024-01-28 | Stop reason: HOSPADM

## 2024-01-26 RX ORDER — PROCHLORPERAZINE EDISYLATE 5 MG/ML
10 INJECTION INTRAMUSCULAR; INTRAVENOUS ONCE
Status: COMPLETED | OUTPATIENT
Start: 2024-01-26 | End: 2024-01-26

## 2024-01-26 RX ORDER — INSULIN LISPRO 100 [IU]/ML
2-9 INJECTION, SOLUTION INTRAVENOUS; SUBCUTANEOUS
Status: DISCONTINUED | OUTPATIENT
Start: 2024-01-26 | End: 2024-01-28 | Stop reason: HOSPADM

## 2024-01-26 RX ORDER — SODIUM CHLORIDE, SODIUM LACTATE, POTASSIUM CHLORIDE, AND CALCIUM CHLORIDE .6; .31; .03; .02 G/100ML; G/100ML; G/100ML; G/100ML
30 INJECTION, SOLUTION INTRAVENOUS ONCE
Status: COMPLETED | OUTPATIENT
Start: 2024-01-26 | End: 2024-01-26

## 2024-01-26 RX ORDER — ONDANSETRON 2 MG/ML
4 INJECTION INTRAMUSCULAR; INTRAVENOUS EVERY 4 HOURS PRN
Status: DISCONTINUED | OUTPATIENT
Start: 2024-01-26 | End: 2024-01-28 | Stop reason: HOSPADM

## 2024-01-26 RX ORDER — ACETAMINOPHEN 325 MG/1
650 TABLET ORAL EVERY 6 HOURS PRN
Status: DISCONTINUED | OUTPATIENT
Start: 2024-01-26 | End: 2024-01-28

## 2024-01-26 RX ORDER — BISACODYL 10 MG
10 SUPPOSITORY, RECTAL RECTAL
Status: DISCONTINUED | OUTPATIENT
Start: 2024-01-26 | End: 2024-01-27

## 2024-01-26 RX ORDER — PROCHLORPERAZINE EDISYLATE 5 MG/ML
5-10 INJECTION INTRAMUSCULAR; INTRAVENOUS EVERY 4 HOURS PRN
Status: DISCONTINUED | OUTPATIENT
Start: 2024-01-26 | End: 2024-01-28 | Stop reason: HOSPADM

## 2024-01-26 RX ORDER — CEFTRIAXONE 2 G/1
2000 INJECTION, POWDER, FOR SOLUTION INTRAMUSCULAR; INTRAVENOUS ONCE
Status: COMPLETED | OUTPATIENT
Start: 2024-01-26 | End: 2024-01-26

## 2024-01-26 RX ORDER — MORPHINE SULFATE 4 MG/ML
2 INJECTION INTRAVENOUS
Status: DISCONTINUED | OUTPATIENT
Start: 2024-01-26 | End: 2024-01-27

## 2024-01-26 RX ORDER — SODIUM CHLORIDE 9 MG/ML
INJECTION, SOLUTION INTRAVENOUS
Status: ACTIVE
Start: 2024-01-26 | End: 2024-01-26

## 2024-01-26 RX ORDER — POLYETHYLENE GLYCOL 3350 17 G/17G
1 POWDER, FOR SOLUTION ORAL
Status: DISCONTINUED | OUTPATIENT
Start: 2024-01-26 | End: 2024-01-27

## 2024-01-26 RX ORDER — DEXTROSE MONOHYDRATE 25 G/50ML
25 INJECTION, SOLUTION INTRAVENOUS
Status: DISCONTINUED | OUTPATIENT
Start: 2024-01-26 | End: 2024-01-28 | Stop reason: HOSPADM

## 2024-01-26 RX ORDER — BISACODYL 5 MG
10 TABLET, DELAYED RELEASE (ENTERIC COATED) ORAL ONCE
Status: COMPLETED | OUTPATIENT
Start: 2024-01-26 | End: 2024-01-26

## 2024-01-26 RX ORDER — ENOXAPARIN SODIUM 100 MG/ML
40 INJECTION SUBCUTANEOUS DAILY
Status: DISCONTINUED | OUTPATIENT
Start: 2024-01-26 | End: 2024-01-28 | Stop reason: HOSPADM

## 2024-01-26 RX ORDER — KETOROLAC TROMETHAMINE 10 MG/1
10 TABLET, FILM COATED ORAL 3 TIMES DAILY PRN
Qty: 15 TABLET | Refills: 0 | Status: SHIPPED | OUTPATIENT
Start: 2024-01-26

## 2024-01-26 RX ORDER — DICYCLOMINE HYDROCHLORIDE 10 MG/1
10 CAPSULE ORAL
Status: ON HOLD | COMMUNITY
End: 2024-01-28

## 2024-01-26 RX ORDER — SODIUM CHLORIDE, SODIUM LACTATE, POTASSIUM CHLORIDE, CALCIUM CHLORIDE 600; 310; 30; 20 MG/100ML; MG/100ML; MG/100ML; MG/100ML
INJECTION, SOLUTION INTRAVENOUS CONTINUOUS
Status: DISCONTINUED | OUTPATIENT
Start: 2024-01-26 | End: 2024-01-28 | Stop reason: HOSPADM

## 2024-01-26 RX ORDER — TAMSULOSIN HYDROCHLORIDE 0.4 MG/1
0.4 CAPSULE ORAL DAILY
COMMUNITY

## 2024-01-26 RX ORDER — AZITHROMYCIN 250 MG/1
250 TABLET, FILM COATED ORAL DAILY
Status: ON HOLD | COMMUNITY
Start: 2024-01-24 | End: 2024-01-28

## 2024-01-26 RX ORDER — INSULIN LISPRO 100 [IU]/ML
8-20 INJECTION, SOLUTION INTRAVENOUS; SUBCUTANEOUS
COMMUNITY

## 2024-01-26 RX ORDER — SODIUM CHLORIDE 9 MG/ML
1000 INJECTION, SOLUTION INTRAVENOUS ONCE
Status: COMPLETED | OUTPATIENT
Start: 2024-01-27 | End: 2024-01-27

## 2024-01-26 RX ORDER — OXYCODONE HYDROCHLORIDE 5 MG/1
2.5 TABLET ORAL
Status: DISCONTINUED | OUTPATIENT
Start: 2024-01-26 | End: 2024-01-27

## 2024-01-26 RX ORDER — GUAIFENESIN 600 MG/1
600 TABLET, EXTENDED RELEASE ORAL EVERY 12 HOURS
Status: ON HOLD | COMMUNITY
End: 2024-01-28

## 2024-01-26 RX ORDER — ONDANSETRON 4 MG/1
4 TABLET, ORALLY DISINTEGRATING ORAL EVERY 4 HOURS PRN
Status: DISCONTINUED | OUTPATIENT
Start: 2024-01-26 | End: 2024-01-28 | Stop reason: HOSPADM

## 2024-01-26 RX ORDER — KETOROLAC TROMETHAMINE 15 MG/ML
15 INJECTION, SOLUTION INTRAMUSCULAR; INTRAVENOUS ONCE
Status: COMPLETED | OUTPATIENT
Start: 2024-01-26 | End: 2024-01-26

## 2024-01-26 RX ORDER — OXYCODONE HYDROCHLORIDE 5 MG/1
5 TABLET ORAL
Status: DISCONTINUED | OUTPATIENT
Start: 2024-01-26 | End: 2024-01-27

## 2024-01-26 RX ADMIN — FAMOTIDINE 40 MG: 10 INJECTION, SOLUTION INTRAVENOUS at 04:50

## 2024-01-26 RX ADMIN — PROCHLORPERAZINE EDISYLATE 10 MG: 5 INJECTION INTRAMUSCULAR; INTRAVENOUS at 04:52

## 2024-01-26 RX ADMIN — INSULIN LISPRO 3 UNITS: 100 INJECTION, SOLUTION INTRAVENOUS; SUBCUTANEOUS at 20:38

## 2024-01-26 RX ADMIN — SODIUM CHLORIDE, POTASSIUM CHLORIDE, SODIUM LACTATE AND CALCIUM CHLORIDE: 600; 310; 30; 20 INJECTION, SOLUTION INTRAVENOUS at 20:31

## 2024-01-26 RX ADMIN — SODIUM CHLORIDE, POTASSIUM CHLORIDE, SODIUM LACTATE AND CALCIUM CHLORIDE 2382 ML: 600; 310; 30; 20 INJECTION, SOLUTION INTRAVENOUS at 09:45

## 2024-01-26 RX ADMIN — DICYCLOMINE HYDROCHLORIDE 10 MG: 20 TABLET ORAL at 17:50

## 2024-01-26 RX ADMIN — LIDOCAINE HYDROCHLORIDE 30 ML: 20 SOLUTION ORAL; TOPICAL at 04:49

## 2024-01-26 RX ADMIN — BISACODYL 10 MG: 5 TABLET, COATED ORAL at 06:18

## 2024-01-26 RX ADMIN — ENOXAPARIN SODIUM 40 MG: 100 INJECTION SUBCUTANEOUS at 17:50

## 2024-01-26 RX ADMIN — DICYCLOMINE HYDROCHLORIDE 10 MG: 20 TABLET ORAL at 20:38

## 2024-01-26 RX ADMIN — INSULIN LISPRO 5 UNITS: 100 INJECTION, SOLUTION INTRAVENOUS; SUBCUTANEOUS at 17:49

## 2024-01-26 RX ADMIN — INSULIN LISPRO 2 UNITS: 100 INJECTION, SOLUTION INTRAVENOUS; SUBCUTANEOUS at 17:50

## 2024-01-26 RX ADMIN — SENNOSIDES AND DOCUSATE SODIUM 2 TABLET: 50; 8.6 TABLET ORAL at 11:45

## 2024-01-26 RX ADMIN — INSULIN GLARGINE-YFGN 14 UNITS: 100 INJECTION, SOLUTION SUBCUTANEOUS at 17:48

## 2024-01-26 RX ADMIN — KETOROLAC TROMETHAMINE 15 MG: 15 INJECTION, SOLUTION INTRAMUSCULAR; INTRAVENOUS at 06:20

## 2024-01-26 RX ADMIN — CEFTRIAXONE SODIUM 2000 MG: 2 INJECTION, POWDER, FOR SOLUTION INTRAMUSCULAR; INTRAVENOUS at 11:44

## 2024-01-26 RX ADMIN — SENNOSIDES AND DOCUSATE SODIUM 2 TABLET: 50; 8.6 TABLET ORAL at 18:00

## 2024-01-26 RX ADMIN — DICYCLOMINE HYDROCHLORIDE 10 MG: 20 TABLET ORAL at 11:45

## 2024-01-26 ASSESSMENT — ENCOUNTER SYMPTOMS
VOMITING: 0
NAUSEA: 0
CHILLS: 0
HEMOPTYSIS: 0
ORTHOPNEA: 0
SPUTUM PRODUCTION: 0
COUGH: 0
DIZZINESS: 0
PALPITATIONS: 0
ABDOMINAL PAIN: 1

## 2024-01-26 ASSESSMENT — LIFESTYLE VARIABLES
AVERAGE NUMBER OF DAYS PER WEEK YOU HAVE A DRINK CONTAINING ALCOHOL: 0
HAVE PEOPLE ANNOYED YOU BY CRITICIZING YOUR DRINKING: NO
TOTAL SCORE: 0
EVER HAD A DRINK FIRST THING IN THE MORNING TO STEADY YOUR NERVES TO GET RID OF A HANGOVER: NO
CONSUMPTION TOTAL: NEGATIVE
TOTAL SCORE: 0
HOW MANY TIMES IN THE PAST YEAR HAVE YOU HAD 5 OR MORE DRINKS IN A DAY: 0
ALCOHOL_USE: NO
EVER FELT BAD OR GUILTY ABOUT YOUR DRINKING: NO
ON A TYPICAL DAY WHEN YOU DRINK ALCOHOL HOW MANY DRINKS DO YOU HAVE: 0
HAVE YOU EVER FELT YOU SHOULD CUT DOWN ON YOUR DRINKING: NO
TOTAL SCORE: 0

## 2024-01-26 ASSESSMENT — PATIENT HEALTH QUESTIONNAIRE - PHQ9
2. FEELING DOWN, DEPRESSED, IRRITABLE, OR HOPELESS: NOT AT ALL
1. LITTLE INTEREST OR PLEASURE IN DOING THINGS: NOT AT ALL
SUM OF ALL RESPONSES TO PHQ9 QUESTIONS 1 AND 2: 0

## 2024-01-26 ASSESSMENT — COGNITIVE AND FUNCTIONAL STATUS - GENERAL
MOVING FROM LYING ON BACK TO SITTING ON SIDE OF FLAT BED: A LITTLE
PERSONAL GROOMING: A LITTLE
DRESSING REGULAR LOWER BODY CLOTHING: A LITTLE
TURNING FROM BACK TO SIDE WHILE IN FLAT BAD: A LITTLE
MOVING TO AND FROM BED TO CHAIR: A LITTLE
STANDING UP FROM CHAIR USING ARMS: A LITTLE
SUGGESTED CMS G CODE MODIFIER DAILY ACTIVITY: CK
SUGGESTED CMS G CODE MODIFIER MOBILITY: CK
CLIMB 3 TO 5 STEPS WITH RAILING: A LOT
EATING MEALS: A LITTLE
HELP NEEDED FOR BATHING: A LITTLE
DAILY ACTIVITIY SCORE: 18
WALKING IN HOSPITAL ROOM: A LOT
DRESSING REGULAR UPPER BODY CLOTHING: A LITTLE
MOBILITY SCORE: 16
TOILETING: A LITTLE

## 2024-01-26 ASSESSMENT — PAIN DESCRIPTION - PAIN TYPE
TYPE: ACUTE PAIN

## 2024-01-26 ASSESSMENT — FIBROSIS 4 INDEX
FIB4 SCORE: 0.66
FIB4 SCORE: 0.66
FIB4 SCORE: 0.89
FIB4 SCORE: 0.89

## 2024-01-26 NOTE — DISCHARGE PLANNING
note:  Received a call from charge nurse for transportation assistance. Pt was brought in by EMS.    Met with pt and confirmed his address. Taxi voucher provided and delivered charge nurse desk.

## 2024-01-26 NOTE — DISCHARGE PLANNING
note:  Taxi voucher cancelled as pt had a syncopal episode right before discharge and pt is being admitted.

## 2024-01-26 NOTE — ED NOTES
Med rec completed per pt and RX bottles (returned)   Allergies reviewed     Pt started a 4 day course of Azithromycin on 1/23/2024 and has only taken 1 dose of this antibiotic

## 2024-01-26 NOTE — ASSESSMENT & PLAN NOTE
Patient uses only insulin sliding scale at home, no long acting insulin  I ordered medium intensity sliding scale

## 2024-01-26 NOTE — ASSESSMENT & PLAN NOTE
Recurrent- last episode in August (admitted 8/3 but returned 2 more times after in August to ED)  Current episode admitted 1/20 at [facility] (KUB stool, CT negative) then returned to ER 1/24,25,26 (admitted here)  KUB here still lots of stool, despite multiple BMs still has large amount of LUQ stool, and gas, pain is LUQ-  Continue Miralax, add Reglan PO sucralfate  No indication for urgent endoscopy.  Has been on intermittent chronic oxycodone PDMP reviewed and Rx's are not contiguous

## 2024-01-26 NOTE — ED PROVIDER NOTES
ED Provider Note    CHIEF COMPLAINT  Chief Complaint   Patient presents with    Abdominal Pain     Pt states that he had frequent abdominal pain mostly on the epigastric area for the past weeks. He had frequent visit at [facility] with same complain.     Nausea       EXTERNAL RECORDS REVIEWED  Care everywhere patient has been seen 5 times in the last week at [facility] for abdominal pain with normal labs, no radiologic studies.  He was discharged home after 1 hospital admission on the 20th.    HPI/ROS  LIMITATION TO HISTORY   Select: Behavior  OUTSIDE HISTORIAN(S):  EMS paramedics state that patient wanted to come here instead of [facility]    Christoph Warren Taylor is a 58 y.o. male who presents tonight via EMS from his home with a chief complaint of epigastric abdominal pain.  Patient has a known history of type II diabetes mellitus, chronic kidney disease stage III, chronic abdominal pain.    PAST MEDICAL HISTORY   has a past medical history of Abnormal electrocardiogram (ECG) (EKG) (11/8/2021), KRISTAL (acute kidney injury) (formerly [facility]) (11/8/2021), Chest pain in adult (11/8/2021), CKD (chronic kidney disease) stage 3, GFR 30-59 ml/min (formerly [facility]) (11/7/2021), Diabetes (formerly [facility]), Diabetic ketoacidosis without coma associated with type 2 diabetes mellitus (formerly [facility]) (11/7/2021), Diarrhea (3/18/2022), DKA, type 1, not at goal (formerly [facility]) (7/28/2022), Esophagitis (7/28/2022), [place] (hard of hearing), and Hypercholesteremia.    SURGICAL HISTORY   has a past surgical history that includes other; upper gi endoscopy,diagnosis (N/A, 3/14/2023); and finger or hand incision and drainage (Right, 6/12/2023).    FAMILY HISTORY  Family History   Problem Relation Age of Onset    Heart Disease Father        SOCIAL HISTORY  Social History     Tobacco Use    Smoking status: Former    Smokeless tobacco: Never   Vaping Use    Vaping Use: Not on file   Substance and Sexual Activity    Alcohol use: Not Currently    Drug use: Not  "Currently    Sexual activity: Not on file       CURRENT MEDICATIONS  Home Medications    **Home medications have not yet been reviewed for this encounter**         ALLERGIES  No Known Allergies    PHYSICAL EXAM  VITAL SIGNS: BP (!) 162/90   Pulse 91   Temp 36.5 °C (97.7 °F) (Temporal)   Resp 17   Ht 1.829 m (6')   Wt 79.4 kg (175 lb)   SpO2 95%   BMI 23.73 kg/m²    Constitutional: Patient is a chronically ill-appearing unkempt male writhing and moaning in pain repetitively stating\" my stomach hurts, give me something for pain\".  Patient will not give direct eye contact has his eyes closed when spoken to.  HENT: Normocephalic, atraumatic. Oropharynx moist without erythema or exudates.  Eyes: PERRL, EOMI, Conjunctiva without erythema.  Neck: Supple   Lymphatic: No lymphadenopathy noted.   Cardiovascular: Normal heart rate and Regular rhythm. No murmur  Thorax & Lungs: Clear and equal breath sounds with good excursion. No respiratory distress, no rhonchi, wheezing or rales.   Abdomen: Bowel sounds normal in all four quadrants. Soft, thin with epigastric tenderness upon palpation, no rebound, guarding, flank tenderness.  No palpable masses.  Skin: Pale, warm and dry  Back: No cervical, thoracic, or lumbosacral tenderness.  Extremities: Peripheral pulses 4/4 No edema, No tenderness  Musculoskeletal: Normal range of motion in all major joints.   Neurologic: Alert & oriented x 3, Normal motor function, Normal sensory function, No lateralizing or focal deficits noted. DTR's 4/4 bilaterally.  Psychiatric: Affect very odd    DIAGNOSTIC STUDIES / PROCEDURES      Results for orders placed or performed during the hospital encounter of 01/26/24   COMP METABOLIC PANEL   Result Value Ref Range    Sodium 131 (L) 135 - 145 mmol/L    Potassium 3.7 3.6 - 5.5 mmol/L    Chloride 94 (L) 96 - 112 mmol/L    Co2 26 20 - 33 mmol/L    Anion Gap 11.0 7.0 - 16.0    Glucose 286 (H) 65 - 99 mg/dL    Bun 13 8 - 22 mg/dL    Creatinine 0.53 " 0.50 - 1.40 mg/dL    Calcium 8.5 8.4 - 10.2 mg/dL    Correct Calcium 8.9 8.5 - 10.5 mg/dL    AST(SGOT) 12 12 - 45 U/L    ALT(SGPT) 12 2 - 50 U/L    Alkaline Phosphatase 81 30 - 99 U/L    Total Bilirubin 0.4 0.1 - 1.5 mg/dL    Albumin 3.5 3.2 - 4.9 g/dL    Total Protein 6.3 6.0 - 8.2 g/dL    Globulin 2.8 1.9 - 3.5 g/dL    A-G Ratio 1.3 g/dL   LIPASE   Result Value Ref Range    Lipase 12 11 - 82 U/L   CBC WITH DIFFERENTIAL   Result Value Ref Range    WBC 6.9 4.8 - 10.8 K/uL    RBC 4.72 4.70 - 6.10 M/uL    Hemoglobin 13.4 (L) 14.0 - 18.0 g/dL    Hematocrit 37.5 (L) 42.0 - 52.0 %    MCV 79.4 (L) 81.4 - 97.8 fL    MCH 28.4 27.0 - 33.0 pg    MCHC 35.7 32.3 - 36.5 g/dL    RDW 36.5 35.9 - 50.0 fL    Platelet Count 234 164 - 446 K/uL    MPV 8.7 (L) 9.0 - 12.9 fL    Neutrophils-Polys 78.80 (H) 44.00 - 72.00 %    Lymphocytes 14.90 (L) 22.00 - 41.00 %    Monocytes 5.20 0.00 - 13.40 %    Eosinophils 0.70 0.00 - 6.90 %    Basophils 0.10 0.00 - 1.80 %    Immature Granulocytes 0.30 0.00 - 0.90 %    Nucleated RBC 0.00 0.00 - 0.20 /100 WBC    Neutrophils (Absolute) 5.46 1.82 - 7.42 K/uL    Lymphs (Absolute) 1.03 1.00 - 4.80 K/uL    Monos (Absolute) 0.36 0.00 - 0.85 K/uL    Eos (Absolute) 0.05 0.00 - 0.51 K/uL    Baso (Absolute) 0.01 0.00 - 0.12 K/uL    Immature Granulocytes (abs) 0.02 0.00 - 0.11 K/uL    NRBC (Absolute) 0.00 K/uL   ESTIMATED GFR   Result Value Ref Range    GFR (CKD-EPI) 115 >60 mL/min/1.73 m 2        RADIOLOGY  I have independently interpreted the diagnostic imaging associated with this visit and am waiting the final reading from the radiologist.   My preliminary interpretation is as follows: Constipation  Radiologist interpretation:   DX-ABDOMEN COMPLETE WITH AP OR PA CXR   Final Result         1.  No acute cardiopulmonary disease is evident.   2.  Left mid abdominal calcification, appearance favoring urinary calculus.   3.  Moderate quantity of stool in the colon, appearance compatible changes of constipation,  otherwise nonspecific bowel gas pattern.      CT-RENAL COLIC EVALUATION(A/P W/O)    (Results Pending)        COURSE & MEDICAL DECISION MAKING    ED Observation Status? No; Patient does not meet criteria for ED Observation.     INITIAL ASSESSMENT, COURSE AND PLAN  Care Narrative: Patient was given IV of normal saline for his complaints of nausea and vomiting.  He was treated with Pepcid, GI cocktail and Compazine.  His white blood cell count was normal at 6.9 and H&H is stable.  His sodium is 131, chloride 94 and his glucose was 286.  He will be treated with IV fluids.  His three-view abdomen shows a calcification in the left mid abdomen therefore I have ordered a CT abdomen and pelvis for renal colic to rule out possible ureteral obstruction.  He is also extremely constipated and will be treated with laxatives for that reason.  His care will be signed out to Dr. Waller for CT results and discharge if negative.  He is currently stable and improving.  HYDRATION: Based on the patient's presentation of Acute Vomiting the patient was given IV fluids. IV Hydration was used because oral hydration was not adequate alone. Upon recheck following hydration, the patient was improved..      ADDITIONAL PROBLEM LIST  Type 2 diabetes mellitus, aggressive behavior  DISPOSITION AND DISCUSSIONS  I have discussed management of the patient with the following physicians and MICHELLE's: None    Discussion of management with other QHP or appropriate source(s): None     Barriers to care at this time, including but not limited to:  None .     Decision tools and prescription drugs considered including, but not limited to: Pain Medications Zofran ODT, Toradol, laxatives, patient is to increase fluids and fiber in his diet to avoid constipation.  He has to follow-up with his primary care provider on Monday for recheck and return of problems. .    FINAL DIAGNOSIS  1. Nausea and vomiting, unspecified vomiting type    2. Epigastric abdominal pain     3. Constipation, unspecified constipation type    4. Type 2 diabetes mellitus with hyperglycemia, unspecified whether long term insulin use (HCC)           Electronically signed by: Cleopatra Andrade D.O., 1/26/2024 4:55 AM       [No Acute Distress] : no acute distress [Well Nourished] : well nourished [Well Developed] : well developed [Well-Appearing] : well-appearing [Normal Sclera/Conjunctiva] : normal sclera/conjunctiva [PERRL] : pupils equal round and reactive to light [EOMI] : extraocular movements intact [Normal Outer Ear/Nose] : the outer ears and nose were normal in appearance [Normal Oropharynx] : the oropharynx was normal [No JVD] : no jugular venous distention [No Lymphadenopathy] : no lymphadenopathy [Supple] : supple [Thyroid Normal, No Nodules] : the thyroid was normal and there were no nodules present [No Respiratory Distress] : no respiratory distress  [No Accessory Muscle Use] : no accessory muscle use [Clear to Auscultation] : lungs were clear to auscultation bilaterally [Normal Rate] : normal rate  [Regular Rhythm] : with a regular rhythm [Normal S1, S2] : normal S1 and S2 [No Murmur] : no murmur heard [No Carotid Bruits] : no carotid bruits [No Abdominal Bruit] : a ~M bruit was not heard ~T in the abdomen [No Varicosities] : no varicosities [Pedal Pulses Present] : the pedal pulses are present [No Edema] : there was no peripheral edema [No Palpable Aorta] : no palpable aorta [No Extremity Clubbing/Cyanosis] : no extremity clubbing/cyanosis [Soft] : abdomen soft [Non Tender] : non-tender [Non-distended] : non-distended [No Masses] : no abdominal mass palpated [No HSM] : no HSM [Normal Bowel Sounds] : normal bowel sounds [Normal Posterior Cervical Nodes] : no posterior cervical lymphadenopathy [Normal Anterior Cervical Nodes] : no anterior cervical lymphadenopathy [No CVA Tenderness] : no CVA  tenderness [No Spinal Tenderness] : no spinal tenderness [No Joint Swelling] : no joint swelling [Grossly Normal Strength/Tone] : grossly normal strength/tone [No Rash] : no rash [Coordination Grossly Intact] : coordination grossly intact [No Focal Deficits] : no focal deficits [Normal Gait] : normal gait [Deep Tendon Reflexes (DTR)] : deep tendon reflexes were 2+ and symmetric [Normal Affect] : the affect was normal [Normal Insight/Judgement] : insight and judgment were intact

## 2024-01-26 NOTE — DISCHARGE INSTRUCTIONS
If patient is unable to void by 6 PM discharge home with Crowley catheter and leg bag.  Increase fluids especially water and water based products, diet- controlled carbohydrate  Make sure you are taking your current medications as directed  If you are on Oxycodone for back pain flare- make sure to take Miralax BID  Follow-up with your primary care provider within 10 days, Crowley can likely be removed at that time.  Increased activity, mobility  avinash ewill increase chances of being able to void after   High-fiber diet i.e. bran cereal, bran muffins, raw vegetables i.e. celery and broccoli.

## 2024-01-26 NOTE — ASSESSMENT & PLAN NOTE
Pre-syncopal in the emergency room, blood pressure as low as the 40's per ER physician (cannot see any opioids given)  DDx includes infection, vasovagal, orthostasis- BP has been stable since admit

## 2024-01-26 NOTE — ED PROVIDER NOTES
ED PHYSICIAN NOTE    CHIEF COMPLAINT  Chief Complaint   Patient presents with    Abdominal Pain     Pt states that he had frequent abdominal pain mostly on the epigastric area for the past weeks. He had frequent visit at [facility] with same complain.     Nausea       EXTERNAL RECORDS REVIEWED  Patient was admitted to [facility] on 1/20 with abdominal pain and orthostasis.  He had urinary retention.  He was hypoxic at the time.  He was hypoglycemic.  Reported history of chronic opiate use.  He was discharged to return to their facility on 1/24 and 1/25.  He was complaining of abdominal pain.  Had unremarkable evaluation.  Patient was in this emergency department last night.  Has been released from the hospital after reassuring evaluation.     HPI/ROS  LIMITATION TO HISTORY   Patient is lethargic  OUTSIDE HISTORIAN(S):      Christoph Taylor is a 58 y.o. male who presents to the emergency department abdominal pain.  Arrangements were made for hospital discharge after reassuring evaluation.  Upon discharge the patient was sitting in a chair.  He reported that he was feeling lightheaded.  Blood pressure checked.  The patient was hypotensive.  He reports he still having abdominal pain.  He has not vomited since prior to ED encounter today.  His pain is located in his epigastrium.  Nonradiating constant.  He feels weak lightheaded.  Feels like he is going to pass out.  No diarrhea.  Has slight dysuria.    PAST MEDICAL HISTORY  Past Medical History:   Diagnosis Date    Abnormal electrocardiogram (ECG) (EKG) 11/8/2021    KRISTAL (acute kidney injury) ([facility]) 11/8/2021    Chest pain in adult 11/8/2021    CKD (chronic kidney disease) stage 3, GFR 30-59 ml/min ([facility]) 11/7/2021    Diabetes (HCC)     Diabetic ketoacidosis without coma associated with type 2 diabetes mellitus ([facility]) 11/7/2021    Diarrhea 3/18/2022    DKA, type 1, not at goal ([facility]) 7/28/2022    Esophagitis 7/28/2022    [place]     Very  [place] No hearing aides    Hypercholesteremia        SOCIAL HISTORY  Social History     Tobacco Use    Smoking status: Former    Smokeless tobacco: Never   Substance Use Topics    Alcohol use: Not Currently    Drug use: Not Currently       CURRENT MEDICATIONS  Home Medications    **Home medications have not yet been reviewed for this encounter**         ALLERGIES  No Known Allergies    PHYSICAL EXAM  VITAL SIGNS: BP (!) 49/29   Pulse 87   Temp 36.3 °C (97.4 °F) (Temporal)   Resp 13   Ht 1.829 m (6')   Wt 79.4 kg (175 lb)   SpO2 95%   BMI 23.73 kg/m²    Constitutional: Tired appearing.  Pale.  Weak appearing  HENT: Normal inspection  Eyes: Normal inspection  Neck: Grossly normal range of motion.  Cardiovascular: Normal heart rate, Normal rhythm.  Symmetric peripheral pulses.   Thorax & Lungs: No respiratory distress, No wheezing, No rales, No rhonchi, No chest tenderness.   Abdomen: Bowel sounds normal, soft, non-distended, nontender, no mass  Skin: No obvious rash.  Back: No tenderness, No CVA tenderness.   Extremities: No clubbing, cyanosis, edema, no Homans or cords.      DIAGNOSTIC STUDIES / PROCEDURES  LABS/EKG  Results for orders placed or performed during the hospital encounter of 01/26/24   COMP METABOLIC PANEL   Result Value Ref Range    Sodium 131 (L) 135 - 145 mmol/L    Potassium 3.7 3.6 - 5.5 mmol/L    Chloride 94 (L) 96 - 112 mmol/L    Co2 26 20 - 33 mmol/L    Anion Gap 11.0 7.0 - 16.0    Glucose 286 (H) 65 - 99 mg/dL    Bun 13 8 - 22 mg/dL    Creatinine 0.53 0.50 - 1.40 mg/dL    Calcium 8.5 8.4 - 10.2 mg/dL    Correct Calcium 8.9 8.5 - 10.5 mg/dL    AST(SGOT) 12 12 - 45 U/L    ALT(SGPT) 12 2 - 50 U/L    Alkaline Phosphatase 81 30 - 99 U/L    Total Bilirubin 0.4 0.1 - 1.5 mg/dL    Albumin 3.5 3.2 - 4.9 g/dL    Total Protein 6.3 6.0 - 8.2 g/dL    Globulin 2.8 1.9 - 3.5 g/dL    A-G Ratio 1.3 g/dL   LIPASE   Result Value Ref Range    Lipase 12 11 - 82 U/L   CBC WITH DIFFERENTIAL   Result Value Ref Range     WBC 6.9 4.8 - 10.8 K/uL    RBC 4.72 4.70 - 6.10 M/uL    Hemoglobin 13.4 (L) 14.0 - 18.0 g/dL    Hematocrit 37.5 (L) 42.0 - 52.0 %    MCV 79.4 (L) 81.4 - 97.8 fL    MCH 28.4 27.0 - 33.0 pg    MCHC 35.7 32.3 - 36.5 g/dL    RDW 36.5 35.9 - 50.0 fL    Platelet Count 234 164 - 446 K/uL    MPV 8.7 (L) 9.0 - 12.9 fL    Neutrophils-Polys 78.80 (H) 44.00 - 72.00 %    Lymphocytes 14.90 (L) 22.00 - 41.00 %    Monocytes 5.20 0.00 - 13.40 %    Eosinophils 0.70 0.00 - 6.90 %    Basophils 0.10 0.00 - 1.80 %    Immature Granulocytes 0.30 0.00 - 0.90 %    Nucleated RBC 0.00 0.00 - 0.20 /100 WBC    Neutrophils (Absolute) 5.46 1.82 - 7.42 K/uL    Lymphs (Absolute) 1.03 1.00 - 4.80 K/uL    Monos (Absolute) 0.36 0.00 - 0.85 K/uL    Eos (Absolute) 0.05 0.00 - 0.51 K/uL    Baso (Absolute) 0.01 0.00 - 0.12 K/uL    Immature Granulocytes (abs) 0.02 0.00 - 0.11 K/uL    NRBC (Absolute) 0.00 K/uL   ESTIMATED GFR   Result Value Ref Range    GFR (CKD-EPI) 115 >60 mL/min/1.73 m 2   Lactic Acid   Result Value Ref Range    Lactic Acid 1.6 0.5 - 2.0 mmol/L   CBC with Differential   Result Value Ref Range    WBC 8.8 4.8 - 10.8 K/uL    RBC 4.73 4.70 - 6.10 M/uL    Hemoglobin 13.2 (L) 14.0 - 18.0 g/dL    Hematocrit 38.8 (L) 42.0 - 52.0 %    MCV 82.0 81.4 - 97.8 fL    MCH 27.9 27.0 - 33.0 pg    MCHC 34.0 32.3 - 36.5 g/dL    RDW 38.3 35.9 - 50.0 fL    Platelet Count 260 164 - 446 K/uL    MPV 8.8 (L) 9.0 - 12.9 fL    Neutrophils-Polys 80.20 (H) 44.00 - 72.00 %    Lymphocytes 14.10 (L) 22.00 - 41.00 %    Monocytes 4.80 0.00 - 13.40 %    Eosinophils 0.50 0.00 - 6.90 %    Basophils 0.10 0.00 - 1.80 %    Immature Granulocytes 0.30 0.00 - 0.90 %    Nucleated RBC 0.00 0.00 - 0.20 /100 WBC    Neutrophils (Absolute) 7.02 1.82 - 7.42 K/uL    Lymphs (Absolute) 1.23 1.00 - 4.80 K/uL    Monos (Absolute) 0.42 0.00 - 0.85 K/uL    Eos (Absolute) 0.04 0.00 - 0.51 K/uL    Baso (Absolute) 0.01 0.00 - 0.12 K/uL    Immature Granulocytes (abs) 0.03 0.00 - 0.11 K/uL    NRBC  (Absolute) 0.00 K/uL   Complete Metabolic Panel   Result Value Ref Range    Sodium 134 (L) 135 - 145 mmol/L    Potassium 3.4 (L) 3.6 - 5.5 mmol/L    Chloride 94 (L) 96 - 112 mmol/L    Co2 31 20 - 33 mmol/L    Anion Gap 9.0 7.0 - 16.0    Glucose 221 (H) 65 - 99 mg/dL    Bun 13 8 - 22 mg/dL    Creatinine 0.65 0.50 - 1.40 mg/dL    Calcium 8.3 (L) 8.4 - 10.2 mg/dL    Correct Calcium 8.8 8.5 - 10.5 mg/dL    AST(SGOT) 10 (L) 12 - 45 U/L    ALT(SGPT) 12 2 - 50 U/L    Alkaline Phosphatase 75 30 - 99 U/L    Total Bilirubin 0.4 0.1 - 1.5 mg/dL    Albumin 3.4 3.2 - 4.9 g/dL    Total Protein 6.1 6.0 - 8.2 g/dL    Globulin 2.7 1.9 - 3.5 g/dL    A-G Ratio 1.3 g/dL   Urinalysis    Specimen: Urine   Result Value Ref Range    Color Straw     Character Clear     Specific Gravity 1.010 <1.035    Ph 7.0 5.0 - 8.0    Glucose 500 (A) Negative mg/dL    Ketones Negative Negative mg/dL    Protein 30 (A) Negative mg/dL    Bilirubin Negative Negative    Nitrite Negative Negative    Leukocyte Esterase Negative Negative    Occult Blood Trace (A) Negative    Micro Urine Req Microscopic    Magnesium   Result Value Ref Range    Magnesium 1.7 1.5 - 2.5 mg/dL   Phosphorus   Result Value Ref Range    Phosphorus 2.6 2.5 - 4.5 mg/dL   Procalcitonin   Result Value Ref Range    Procalcitonin 0.07 <0.25 ng/mL   C Reactive Protein Quantitative (Non-Cardiac)   Result Value Ref Range    Stat C-Reactive Protein <0.30 0.00 - 0.75 mg/dL   URINE DRUG SCREEN   Result Value Ref Range    Amphetamines Urine Negative Negative    Barbiturates Negative Negative    Benzodiazepines Negative Negative    Cocaine Metabolite Negative Negative    Fentanyl, Urine Negative Negative    Methadone Negative Negative    Opiates Negative Negative    Oxycodone Negative Negative    Phencyclidine -Pcp Negative Negative    Propoxyphene Negative Negative    Cannabinoid Metab Negative Negative   ETHYL ALCOHOL (BLOOD)   Result Value Ref Range    Diagnostic Alcohol <10.1 <10.1 mg/dL   URINE  MICROSCOPIC (W/UA)   Result Value Ref Range    WBC 20-50 (A) /hpf    RBC 10-20 (A) /hpf    Bacteria Many (A) None /hpf    Epithelial Cells Few Few /hpf    Urine Crystals Few Amorphous /hpf    Granular Casts 0-2 /lpf   ESTIMATED GFR   Result Value Ref Range    GFR (CKD-EPI) 108 >60 mL/min/1.73 m 2   EKG   Result Value Ref Range    Report       [facility] Emergency Dept.    Test Date:  2024  Pt Name:    BAILEE JACOBSON               Department: [facility]  MRN:        [id number]                      [facility]  Gender:     Male                         Technician: [id number]  :        1965                   Requested By:MINERVA ALCANTAR  Order #:    [id number]                    Reading MD: MINERVA ALCANTAR MD    Measurements  Intervals                                Axis  Rate:       72                           P:          71  MA:         161                          QRS:        74  QRSD:       94                           T:          72  QT:         430  QTc:        471    Interpretive Statements  Sinus rhythm  Borderline low voltage, extremity leads  Compared to ECG 2023 07:48:36  ST (T wave) deviation no longer present  Electronically Signed On 2024 10:42:43 PST by MINERVA ALCANTAR MD        I have independently interpreted this EKG as documented above  Rhythm strip interpretation-sinus rhythm    RADIOLOGY  I have independently interpreted the diagnostic imaging associated with this visit and am waiting the final reading from the radiologist.   My preliminary interpretation is as follows: CT abdomen without free air or kidney stone  Radiologist interpretation:   CT-RENAL COLIC EVALUATION(A/P W/O)   Final Result         1.  Air-filled prominence of small bowel, appearance suggests ileus and/or enteritis. Radiographic follow-up to resolution recommended as clinically appropriate.   2.  Left hydronephrosis versus extra renal pelvis morphology, similar to prior study. No  visualized obstructing stone is appreciated.   3.  Left nephrolithiasis   4.  Horseshoe kidney morphology.   5.  Diverticulosis   6.  Trace pericardial effusion      DX-ABDOMEN COMPLETE WITH AP OR PA CXR   Final Result         1.  No acute cardiopulmonary disease is evident.   2.  Left mid abdominal calcification, appearance favoring urinary calculus.   3.  Moderate quantity of stool in the colon, appearance compatible changes of constipation, otherwise nonspecific bowel gas pattern.      DX-CHEST-PORTABLE (1 VIEW)    (Results Pending)         COURSE & MEDICAL DECISION MAKING    INITIAL ASSESSMENT, COURSE AND PLAN  Care Narrative: Patient scented initially with abdominal pain as previously dictated.  Patient underwent evaluation.  Reassuring workup.  Upon discharge she was feeling lightheaded.  Blood pressure was taken and it was quite low.  He was reassessed.  Hypotensive and ill-appearing.  Order cc per kilogram fluid bolus.  Repeat laboratory data.  Obtain EKG.  He did describe having some slight stinging dysuria.  Obtained a UA.  At about 10 AM urine resulted showing pyuria.  Infection was suspected at that time and antibiotics were ordered.  Reports he still feels unwell blood pressure is normalized.  There is no lactic acidosis or other remarkable change in data.  Given hypotensive episode urinary tract infection and abdominal pain I consulted the hospitalist.  I discussed case with Dr. Bobo.        DISPOSITION AND DISCUSSIONS  I have discussed management of the patient with the following physicians and MICHELLE's: Dr. Bobo      FINAL IMPRESSION  1.  Hypotension  2.  Urinary tract infection  3.  Abdominal pain    CRITICAL CARE  The very real possibilty of a deterioration of this patient's condition required the highest level of my preparedness for sudden, emergent intervention.  I provided critical care services, which included medication orders, frequent reevaluations of the patient's condition and response to  treatment, ordering and reviewing test results, and discussing the case with various consultants.  The critical care time associated with the care of the patient was 40 minutes. Review chart for interventions. This time is exclusive of any other billable procedures.       This dictation was created using voice recognition software. The accuracy of the dictation is limited to the abilities of the software. I expect there may be some errors of grammar and possibly content. The nursing notes were reviewed and certain aspects of this information were incorporated into this note.    Electronically signed by: Min Waller M.D., 1/26/2024

## 2024-01-26 NOTE — CARE PLAN
The patient is Stable - Low risk of patient condition declining or worsening         Progress made toward(s) clinical / shift goals:      Problem: Pain - Standard  Goal: Alleviation of pain or a reduction in pain to the patient’s comfort goal  Description: Target End Date:  Prior to discharge or change in level of care    Document on Vitals flowsheet    1.  Document pain using the appropriate pain scale per order or unit policy  2.  Educate and implement non-pharmacologic comfort measures (i.e. relaxation, distraction, massage, cold/heat therapy, etc.)  3.  Pain management medications as ordered  4.  Reassess pain after pain med administration per policy  5.  If opiods administered assess patient's response to pain medication is appropriate per POSS sedation scale  6.  Follow pain management plan developed in collaboration with patient and interdisciplinary team (including palliative care or pain specialists if applicable)  Outcome: Progressing     Problem: Knowledge Deficit - Standard  Goal: Patient and family/care givers will demonstrate understanding of plan of care, disease process/condition, diagnostic tests and medications  Description: Target End Date:  1-3 days or as soon as patient condition allows    Document in Patient Education    1.  Patient and family/caregiver oriented to unit, equipment, visitation policy and means for communicating concern  2.  Complete/review Learning Assessment  3.  Assess knowledge level of disease process/condition, treatment plan, diagnostic tests and medications  4.  Explain disease process/condition, treatment plan, diagnostic tests and medications  Outcome: Progressing  Note: Pt updated on POC to include pain management, PT/OT assessment, skin breakdown prevention, and IV ABx     Problem: Skin Integrity  Goal: Skin integrity is maintained or improved  Description: Target End Date:  Prior to discharge or change in level of care    Document interventions on Skin Risk/Oscar  flowsheet groups and corresponding LDA    1.  Assess and monitor skin integrity, appearance and/or temperature  2.  Assess risk factors for impaired skin integrity and/or pressures ulcers  3.  Implement precautions to protect skin integrity in collaboration with interdisciplinary team  4.  Implement pressure ulcer prevention protocol if at risk for skin breakdown  5.  Confirm wound care consult if at risk for skin breakdown  6.  Ensure patient use of pressure relieving devices  (Low air loss bed, waffle overlay, heel protectors, ROHO cushion, etc)  Outcome: Progressing       Patient is not progressing towards the following goals:

## 2024-01-26 NOTE — H&P
Hospital Medicine History & Physical Note    Date of Service  1/26/2024    Primary Care Physician  YESY Tariq.    Code Status  Full Code    Chief Complaint  Chief Complaint   Patient presents with    Abdominal Pain     Pt states that he had frequent abdominal pain mostly on the epigastric area for the past weeks. He had frequent visit at [facility] with same complain.     Nausea       History of Presenting Illness  Christoph Taylor is a 58 y.o. male who presented 1/26/2024 with abdominal pain.    I have reviewed some of the recent provider documentation available to me in the patient's medical chart.  Records are briefly summarized:  Christoph Taylor has past medical history that includes insulin dependent diabetes,  chronic abdominal pain, and osteomyelitis.  He was last hospitalized at [facility] in August 2023 with diabetic ketoacidosis.  Outside medical records show that he was evaluated and treated at [place] emergency room on 1/25/2024 and 1/24/2024 for abdominal pain.    He presented to the emergency room today with abdominal pain. He was evaluated with a CT scan, results were unremarkable.  Discharge from the emergency room was planned but the patient had an episode of lightheadedness/presyncope.  He had become hypotensive with a systolic blood pressure in the 40's.  IV fluids were given.    I discussed the plan of care with patient and bedside RN.  Discussed ER course and results of CT imaging with Dr. Waller of emergency medicine.    Review of Systems  Review of Systems   Constitutional:  Positive for malaise/fatigue. Negative for chills.   Respiratory:  Negative for cough, hemoptysis and sputum production.    Cardiovascular:  Negative for chest pain, palpitations and orthopnea.   Gastrointestinal:  Positive for abdominal pain. Negative for nausea and vomiting.   Skin:  Negative for itching and rash.   Neurological:  Negative for dizziness.   All other systems reviewed and are  negative.      Past Medical History   has a past medical history of Abnormal electrocardiogram (ECG) (EKG) (11/8/2021), KRISTAL (acute kidney injury) ([facility]) (11/8/2021), Chest pain in adult (11/8/2021), CKD (chronic kidney disease) stage 3, GFR 30-59 ml/min ([facility]) (11/7/2021), Diabetes ([facility]), Diabetic ketoacidosis without coma associated with type 2 diabetes mellitus ([facility]) (11/7/2021), Diarrhea (3/18/2022), DKA, type 1, not at goal ([facility]) (7/28/2022), Esophagitis (7/28/2022), [place] (hard of hearing), and Hypercholesteremia.    Surgical History   has a past surgical history that includes other; pr upper gi endoscopy,diagnosis (N/A, 3/14/2023); and finger or hand incision and drainage (Right, 6/12/2023).     Family History  family history includes Heart Disease in his father.   Family history reviewed with patient. There is no family history that is pertinent to the chief complaint.     Social History   reports that he has quit smoking. He has never used smokeless tobacco. He reports that he does not currently use alcohol. He reports that he does not currently use drugs.    Allergies  No Known Allergies    Medications  Prior to Admission Medications   Prescriptions Last Dose Informant Patient Reported? Taking?   azithromycin (ZITHROMAX) 250 MG Tab 1/23/2024 at [facility] Patient, Rx Bottle (For Med Information) Yes Yes   Sig: Take 250 mg by mouth every day. 4 day course   dicyclomine (BENTYL) 10 MG Cap 1/25/2024 at PM Patient, Rx Bottle (For Med Information) Yes Yes   Sig: Take 10 mg by mouth 4 Times a Day,Before Meals and at Bedtime.   guaiFENesin ER (MUCINEX) 600 MG TABLET SR 12 HR 1/24/2024 at [facility] Patient, Rx Bottle (For Med Information) Yes Yes   Sig: Take 600 mg by mouth every 12 hours.   insulin lispro 100 UNIT/ML INJ 1/25/2024 at PM Patient, Rx Bottle (For Med Information) Yes Yes   Sig: Inject 8-20 Units under the skin 3 times a day before meals. Sliding Scale   tamsulosin (FLOMAX) 0.4 MG capsule [facility] Patient, Rx Bottle  (For Med Information) Yes Yes   Sig: Take 0.4 mg by mouth every day.      Facility-Administered Medications: None       Physical Exam  Temp:  [36.3 °C (97.3 °F)-36.8 °C (98.3 °F)] 36.5 °C (97.7 °F)  Pulse:  [72-91] 78  Resp:  [12-20] 14  BP: ()/(29-99) 124/78  SpO2:  [93 %-100 %] 96 %  Blood Pressure: 124/78   Temperature: 36.5 °C (97.7 °F)   Pulse: 78   Respiration: 14   Pulse Oximetry: 96 %       Physical Exam  Constitutional:       General: He is not in acute distress.     Appearance: He is ill-appearing.      Comments: Disheveled    HENT:      Head: Normocephalic and atraumatic.      Right Ear: External ear normal.      Left Ear: External ear normal.      Nose: Nose normal.   Eyes:      Extraocular Movements: Extraocular movements intact.      Pupils: Pupils are equal, round, and reactive to light.      Comments: Injected sclera    Cardiovascular:      Rate and Rhythm: Normal rate and regular rhythm.      Pulses: Normal pulses.   Pulmonary:      Effort: Pulmonary effort is normal. No respiratory distress.      Breath sounds: Normal breath sounds.   Abdominal:      General: Abdomen is flat. Bowel sounds are normal.      Palpations: Abdomen is soft.      Tenderness: There is abdominal tenderness.   Musculoskeletal:         General: Normal range of motion.      Cervical back: Normal range of motion and neck supple.   Skin:     General: Skin is warm and dry.      Coloration: Skin is not jaundiced.      Comments: Left foot with red superficial appearing callus left big toe   Neurological:      General: No focal deficit present.      Mental Status: He is alert and oriented to person, place, and time.      Cranial Nerves: No cranial nerve deficit.      Gait: Gait normal.         Laboratory:  Recent Labs     01/25/24  0728 01/26/24  0444 01/26/24  0925   WBC 6.90 6.9 8.8   RBC 4.50 4.72 4.73   HEMOGLOBIN 12.6* 13.4* 13.2*   HEMATOCRIT 37.3* 37.5* 38.8*   MCV 82.9 79.4* 82.0   MCH 27.9 28.4 27.9   MCHC 33.7 35.7  "34.0   RDW 13.7 36.5 38.3   PLATELETCT 239 234 260   MPV 6.7* 8.7* 8.8*     Recent Labs     01/25/24  0728 01/26/24 0444 01/26/24  0925   SODIUM 135* 131* 134*   POTASSIUM 4.1 3.7 3.4*   CHLORIDE 101 94* 94*   CO2 29.0 26 31   GLUCOSE 271* 286* 221*   BUN 10.0 13 13   CREATININE 0.60 0.53 0.65   CALCIUM 8.0* 8.5 8.3*     Recent Labs     01/25/24  0728 01/26/24 0444 01/26/24  0925   ALTSGPT 11 12 12   ASTSGOT 12 12 10*   ALKPHOSPHAT 71 81 75   TBILIRUBIN 0.7 0.4 0.4   LIPASE  --  12  --    GLUCOSE 271* 286* 221*         No results for input(s): \"NTPROBNP\" in the last 72 hours.      No results for input(s): \"TROPONINT\" in the last 72 hours.    Imaging:  DX-CHEST-PORTABLE (1 VIEW)   Final Result      No evidence of acute cardiopulmonary process.      CT-RENAL COLIC EVALUATION(A/P W/O)   Final Result         1.  Air-filled prominence of small bowel, appearance suggests ileus and/or enteritis. Radiographic follow-up to resolution recommended as clinically appropriate.   2.  Left hydronephrosis versus extra renal pelvis morphology, similar to prior study. No visualized obstructing stone is appreciated.   3.  Left nephrolithiasis   4.  Horseshoe kidney morphology.   5.  Diverticulosis   6.  Trace pericardial effusion      DX-ABDOMEN COMPLETE WITH AP OR PA CXR   Final Result         1.  No acute cardiopulmonary disease is evident.   2.  Left mid abdominal calcification, appearance favoring urinary calculus.   3.  Moderate quantity of stool in the colon, appearance compatible changes of constipation, otherwise nonspecific bowel gas pattern.          X-Ray:  I have personally reviewed the images and compared with prior images. and My impression is: abdominal xray shows no evidence of obstruction    Assessment/Plan:  Justification for Admission Status  I anticipate this patient will require at least two midnights for appropriate medical management, necessitating inpatient admission because he presents hemodynamically " unstable with hypotension and probably infection    Patient will need a Telemetry bed on MEDICAL service .  The need is secondary to pre-syncope.    * Hypotension- (present on admission)  Assessment & Plan  Pre-syncopal in the emergency room, blood pressure as low as the 40's per ER physician  DDx includes infection, vasovagal, orthostasis  Treat UTI  Monitor continuously on telemetry  Patient has been hospitalized with similar symptoms in the past, last echo in 6/2023, no need to repeat at this time  I ordered serum cortisol, diagnostic alcohol, urine drug screen    Abnormal urinalysis- (present on admission)  Assessment & Plan  Significant pyuria on urinalysis  Concerning for infection as the patient also has hypotension  I ordered ceftriaxone  Follow urine cultures    Uncontrolled type 2 diabetes mellitus with hyperglycemia (HCC)- (present on admission)  Assessment & Plan  Patient uses only insulin sliding scale at home, no long acting insulin  I ordered medium intensity sliding scale    Urinary retention- (present on admission)  Assessment & Plan  Crowley catheter  Restart flomax when it is clear that patient is no longer hypotensive    Hyponatremia- (present on admission)  Assessment & Plan  Suspect hypovolemic hyponatremia  IV fluids  I have ordered repeat labs to reassess sodium level after hydration    Diabetic toe ulcer (HCC)- (present on admission)  Assessment & Plan  Wound care    Abdominal pain- (present on admission)  Assessment & Plan  Chronic condition  Continue bentyl        VTE prophylaxis: enoxaparin ppx

## 2024-01-27 ENCOUNTER — APPOINTMENT (OUTPATIENT)
Dept: RADIOLOGY | Facility: MEDICAL CENTER | Age: 59
DRG: 690 | End: 2024-01-27
Attending: INTERNAL MEDICINE
Payer: COMMERCIAL

## 2024-01-27 PROBLEM — L08.9: Status: ACTIVE | Noted: 2024-01-27

## 2024-01-27 PROBLEM — K59.00 CONSTIPATION: Status: ACTIVE | Noted: 2024-01-27

## 2024-01-27 PROBLEM — S50.319A ELBOW ABRASION: Status: ACTIVE | Noted: 2024-01-27

## 2024-01-27 PROBLEM — N30.00 ACUTE CYSTITIS WITHOUT HEMATURIA: Status: ACTIVE | Noted: 2024-01-26

## 2024-01-27 PROBLEM — S50.312A: Status: ACTIVE | Noted: 2024-01-27

## 2024-01-27 PROBLEM — R26.89 FUNCTIONAL GAIT ABNORMALITY: Status: ACTIVE | Noted: 2024-01-27

## 2024-01-27 LAB
ANION GAP SERPL CALC-SCNC: 5 MMOL/L (ref 7–16)
BASOPHILS # BLD AUTO: 0.1 % (ref 0–1.8)
BASOPHILS # BLD: 0.01 K/UL (ref 0–0.12)
BUN SERPL-MCNC: 14 MG/DL (ref 8–22)
CALCIUM SERPL-MCNC: 7.6 MG/DL (ref 8.4–10.2)
CHLORIDE SERPL-SCNC: 101 MMOL/L (ref 96–112)
CO2 SERPL-SCNC: 32 MMOL/L (ref 20–33)
CREAT SERPL-MCNC: 0.69 MG/DL (ref 0.5–1.4)
EOSINOPHIL # BLD AUTO: 0.33 K/UL (ref 0–0.51)
EOSINOPHIL NFR BLD: 4.2 % (ref 0–6.9)
ERYTHROCYTE [DISTWIDTH] IN BLOOD BY AUTOMATED COUNT: 39.9 FL (ref 35.9–50)
GFR SERPLBLD CREATININE-BSD FMLA CKD-EPI: 106 ML/MIN/1.73 M 2
GLUCOSE BLD STRIP.AUTO-MCNC: 124 MG/DL (ref 65–99)
GLUCOSE BLD STRIP.AUTO-MCNC: 132 MG/DL (ref 65–99)
GLUCOSE BLD STRIP.AUTO-MCNC: 230 MG/DL (ref 65–99)
GLUCOSE BLD STRIP.AUTO-MCNC: 308 MG/DL (ref 65–99)
GLUCOSE SERPL-MCNC: 109 MG/DL (ref 65–99)
HCT VFR BLD AUTO: 33.7 % (ref 42–52)
HGB BLD-MCNC: 11.1 G/DL (ref 14–18)
IMM GRANULOCYTES # BLD AUTO: 0.02 K/UL (ref 0–0.11)
IMM GRANULOCYTES NFR BLD AUTO: 0.3 % (ref 0–0.9)
LYMPHOCYTES # BLD AUTO: 1.17 K/UL (ref 1–4.8)
LYMPHOCYTES NFR BLD: 14.9 % (ref 22–41)
MCH RBC QN AUTO: 27.8 PG (ref 27–33)
MCHC RBC AUTO-ENTMCNC: 32.9 G/DL (ref 32.3–36.5)
MCV RBC AUTO: 84.3 FL (ref 81.4–97.8)
MONOCYTES # BLD AUTO: 0.58 K/UL (ref 0–0.85)
MONOCYTES NFR BLD AUTO: 7.4 % (ref 0–13.4)
NEUTROPHILS # BLD AUTO: 5.74 K/UL (ref 1.82–7.42)
NEUTROPHILS NFR BLD: 73.1 % (ref 44–72)
NRBC # BLD AUTO: 0 K/UL
NRBC BLD-RTO: 0 /100 WBC (ref 0–0.2)
PLATELET # BLD AUTO: 210 K/UL (ref 164–446)
PMV BLD AUTO: 8.7 FL (ref 9–12.9)
POTASSIUM SERPL-SCNC: 3.6 MMOL/L (ref 3.6–5.5)
RBC # BLD AUTO: 4 M/UL (ref 4.7–6.1)
SODIUM SERPL-SCNC: 138 MMOL/L (ref 135–145)
WBC # BLD AUTO: 7.9 K/UL (ref 4.8–10.8)

## 2024-01-27 PROCEDURE — 700105 HCHG RX REV CODE 258: Performed by: HOSPITALIST

## 2024-01-27 PROCEDURE — 700102 HCHG RX REV CODE 250 W/ 637 OVERRIDE(OP): Performed by: HOSPITALIST

## 2024-01-27 PROCEDURE — 85025 COMPLETE CBC W/AUTO DIFF WBC: CPT

## 2024-01-27 PROCEDURE — 97166 OT EVAL MOD COMPLEX 45 MIN: CPT

## 2024-01-27 PROCEDURE — 700102 HCHG RX REV CODE 250 W/ 637 OVERRIDE(OP): Performed by: INTERNAL MEDICINE

## 2024-01-27 PROCEDURE — 700111 HCHG RX REV CODE 636 W/ 250 OVERRIDE (IP): Mod: JZ | Performed by: HOSPITALIST

## 2024-01-27 PROCEDURE — 82962 GLUCOSE BLOOD TEST: CPT | Mod: 91

## 2024-01-27 PROCEDURE — 80048 BASIC METABOLIC PNL TOTAL CA: CPT

## 2024-01-27 PROCEDURE — 99232 SBSQ HOSP IP/OBS MODERATE 35: CPT | Performed by: INTERNAL MEDICINE

## 2024-01-27 PROCEDURE — A9270 NON-COVERED ITEM OR SERVICE: HCPCS | Performed by: INTERNAL MEDICINE

## 2024-01-27 PROCEDURE — 36415 COLL VENOUS BLD VENIPUNCTURE: CPT

## 2024-01-27 PROCEDURE — 97162 PT EVAL MOD COMPLEX 30 MIN: CPT

## 2024-01-27 PROCEDURE — 700111 HCHG RX REV CODE 636 W/ 250 OVERRIDE (IP): Performed by: HOSPITALIST

## 2024-01-27 PROCEDURE — 94760 N-INVAS EAR/PLS OXIMETRY 1: CPT

## 2024-01-27 PROCEDURE — 770001 HCHG ROOM/CARE - MED/SURG/GYN PRIV*

## 2024-01-27 PROCEDURE — 74018 RADEX ABDOMEN 1 VIEW: CPT

## 2024-01-27 PROCEDURE — A9270 NON-COVERED ITEM OR SERVICE: HCPCS | Performed by: HOSPITALIST

## 2024-01-27 RX ORDER — TAMSULOSIN HYDROCHLORIDE 0.4 MG/1
0.4 CAPSULE ORAL
Status: DISCONTINUED | OUTPATIENT
Start: 2024-01-27 | End: 2024-01-28 | Stop reason: HOSPADM

## 2024-01-27 RX ORDER — KETOROLAC TROMETHAMINE 15 MG/ML
15 INJECTION, SOLUTION INTRAMUSCULAR; INTRAVENOUS ONCE
Status: COMPLETED | OUTPATIENT
Start: 2024-01-27 | End: 2024-01-27

## 2024-01-27 RX ORDER — AMOXICILLIN 250 MG/1
500 CAPSULE ORAL EVERY 8 HOURS
Status: DISCONTINUED | OUTPATIENT
Start: 2024-01-27 | End: 2024-01-28

## 2024-01-27 RX ORDER — POLYETHYLENE GLYCOL 3350 17 G/17G
1 POWDER, FOR SOLUTION ORAL 3 TIMES DAILY
Status: DISCONTINUED | OUTPATIENT
Start: 2024-01-27 | End: 2024-01-28 | Stop reason: HOSPADM

## 2024-01-27 RX ORDER — METOCLOPRAMIDE 10 MG/1
10 TABLET ORAL
Status: DISCONTINUED | OUTPATIENT
Start: 2024-01-27 | End: 2024-01-28 | Stop reason: HOSPADM

## 2024-01-27 RX ORDER — MORPHINE SULFATE 4 MG/ML
3 INJECTION INTRAVENOUS
Status: DISCONTINUED | OUTPATIENT
Start: 2024-01-27 | End: 2024-01-28 | Stop reason: HOSPADM

## 2024-01-27 RX ORDER — GABAPENTIN 300 MG/1
300 CAPSULE ORAL 3 TIMES DAILY
Status: DISCONTINUED | OUTPATIENT
Start: 2024-01-27 | End: 2024-01-28 | Stop reason: HOSPADM

## 2024-01-27 RX ORDER — OXYCODONE HYDROCHLORIDE 5 MG/1
5 TABLET ORAL EVERY 6 HOURS PRN
Status: DISCONTINUED | OUTPATIENT
Start: 2024-01-27 | End: 2024-01-28 | Stop reason: HOSPADM

## 2024-01-27 RX ORDER — SUCRALFATE 1 G/1
1 TABLET ORAL EVERY 6 HOURS
Status: DISCONTINUED | OUTPATIENT
Start: 2024-01-27 | End: 2024-01-28 | Stop reason: HOSPADM

## 2024-01-27 RX ADMIN — GABAPENTIN 300 MG: 300 CAPSULE ORAL at 15:47

## 2024-01-27 RX ADMIN — SODIUM CHLORIDE, POTASSIUM CHLORIDE, SODIUM LACTATE AND CALCIUM CHLORIDE: 600; 310; 30; 20 INJECTION, SOLUTION INTRAVENOUS at 12:39

## 2024-01-27 RX ADMIN — INSULIN LISPRO 5 UNITS: 100 INJECTION, SOLUTION INTRAVENOUS; SUBCUTANEOUS at 17:19

## 2024-01-27 RX ADMIN — INSULIN GLARGINE-YFGN 14 UNITS: 100 INJECTION, SOLUTION SUBCUTANEOUS at 17:19

## 2024-01-27 RX ADMIN — ENOXAPARIN SODIUM 40 MG: 100 INJECTION SUBCUTANEOUS at 17:17

## 2024-01-27 RX ADMIN — DICYCLOMINE HYDROCHLORIDE 10 MG: 20 TABLET ORAL at 06:07

## 2024-01-27 RX ADMIN — SODIUM CHLORIDE, POTASSIUM CHLORIDE, SODIUM LACTATE AND CALCIUM CHLORIDE: 600; 310; 30; 20 INJECTION, SOLUTION INTRAVENOUS at 18:29

## 2024-01-27 RX ADMIN — SODIUM CHLORIDE, POTASSIUM CHLORIDE, SODIUM LACTATE AND CALCIUM CHLORIDE: 600; 310; 30; 20 INJECTION, SOLUTION INTRAVENOUS at 00:48

## 2024-01-27 RX ADMIN — DICYCLOMINE HYDROCHLORIDE 10 MG: 20 TABLET ORAL at 12:25

## 2024-01-27 RX ADMIN — PROMETHAZINE HYDROCHLORIDE 25 MG: 25 SUPPOSITORY RECTAL at 17:20

## 2024-01-27 RX ADMIN — MORPHINE SULFATE 3 MG: 4 INJECTION, SOLUTION INTRAMUSCULAR; INTRAVENOUS at 18:24

## 2024-01-27 RX ADMIN — AMOXICILLIN 500 MG: 250 CAPSULE ORAL at 21:05

## 2024-01-27 RX ADMIN — GABAPENTIN 300 MG: 300 CAPSULE ORAL at 21:05

## 2024-01-27 RX ADMIN — INSULIN LISPRO 6 UNITS: 100 INJECTION, SOLUTION INTRAVENOUS; SUBCUTANEOUS at 12:36

## 2024-01-27 RX ADMIN — CEFTRIAXONE SODIUM 2000 MG: 2 INJECTION, POWDER, FOR SOLUTION INTRAMUSCULAR; INTRAVENOUS at 05:13

## 2024-01-27 RX ADMIN — KETOROLAC TROMETHAMINE 15 MG: 15 INJECTION, SOLUTION INTRAMUSCULAR; INTRAVENOUS at 01:05

## 2024-01-27 RX ADMIN — INSULIN LISPRO 5 UNITS: 100 INJECTION, SOLUTION INTRAVENOUS; SUBCUTANEOUS at 12:35

## 2024-01-27 RX ADMIN — INSULIN LISPRO 3 UNITS: 100 INJECTION, SOLUTION INTRAVENOUS; SUBCUTANEOUS at 17:17

## 2024-01-27 RX ADMIN — OXYCODONE HYDROCHLORIDE 5 MG: 5 TABLET ORAL at 13:45

## 2024-01-27 RX ADMIN — SODIUM CHLORIDE 1000 ML: 9 INJECTION, SOLUTION INTRAVENOUS at 00:00

## 2024-01-27 RX ADMIN — METOCLOPRAMIDE 10 MG: 10 TABLET ORAL at 21:05

## 2024-01-27 ASSESSMENT — COGNITIVE AND FUNCTIONAL STATUS - GENERAL
SUGGESTED CMS G CODE MODIFIER DAILY ACTIVITY: CH
SUGGESTED CMS G CODE MODIFIER MOBILITY: CH
DAILY ACTIVITIY SCORE: 24
MOBILITY SCORE: 24

## 2024-01-27 ASSESSMENT — ENCOUNTER SYMPTOMS
DIZZINESS: 0
NAUSEA: 1
VOMITING: 0
ABDOMINAL PAIN: 1
SHORTNESS OF BREATH: 0
BLOOD IN STOOL: 0
BACK PAIN: 1
CONSTIPATION: 0
WEAKNESS: 1
NERVOUS/ANXIOUS: 1

## 2024-01-27 ASSESSMENT — PAIN DESCRIPTION - PAIN TYPE
TYPE: ACUTE PAIN

## 2024-01-27 ASSESSMENT — GAIT ASSESSMENTS
ASSISTIVE DEVICE: FRONT WHEEL WALKER
DEVIATION: TRENDELENBERG
GAIT LEVEL OF ASSIST: SUPERVISED
DISTANCE (FEET): 150

## 2024-01-27 ASSESSMENT — FIBROSIS 4 INDEX: FIB4 SCORE: 0.81

## 2024-01-27 ASSESSMENT — ACTIVITIES OF DAILY LIVING (ADL): TOILETING: INDEPENDENT

## 2024-01-27 NOTE — CARE PLAN
The patient is Stable - Low risk of patient condition declining or worsening    Shift Goals  Clinical Goals: Orthostatics, monitor BP, Pain managment  Patient Goals: Comfort, pain managment    Progress made toward(s) clinical / shift goals:    Problem: Pain - Standard  Goal: Alleviation of pain or a reduction in pain to the patient’s comfort goal  Outcome: Progressing  Note: Explained pharmacological and non-pharmacological pain interventions during shift. Patient reporting decreased pain after pharmacological interventions.      Problem: Knowledge Deficit - Standard  Goal: Patient and family/care givers will demonstrate understanding of plan of care, disease process/condition, diagnostic tests and medications  Outcome: Progressing  Note: Reviewed POC with patient. PT/OT involved. Modified diet to maintain blood sugars. Explained purose for sliding scale and  POC glucose monitoring.      Problem: Fall Risk  Goal: Patient will remain free from falls  Outcome: Progressing  Note: Patient free from falls during shift. Patient demonstrates proper use of call light for assistance. Fall precautions in place.        Patient is not progressing towards the following goals:

## 2024-01-27 NOTE — PROGRESS NOTES
4 Eyes Skin Assessment Completed by Bong RN and PENNIE Holman.    Head WDL  Ears WDL  Nose WDL  Mouth WDL  Neck WDL  Breast/Chest WDL  Shoulder Blades WDL  Spine WDL  (R) Arm/Elbow/Hand WDL  (L) Arm/Elbow/Hand Abrasion , l hand dryness  Abdomen WDL  Groin WDL  Scrotum/Coccyx/Buttocks WDL  (R) Leg Bruising  (L) Leg Bruising  (R) Heel/Foot/Toe WDL  (L) Heel/Foot/Toe Ulcer(s)          Devices In Places Tele Box      Interventions In Place Pressure Redistribution Mattress    Possible Skin Injury Yes    Pictures Uploaded Into Epic Yes  Wound Consult Placed Yes  RN Wound Prevention Protocol Ordered Yes

## 2024-01-27 NOTE — THERAPY
Physical Therapy   Initial Evaluation     Patient Name: Christoph Taylor  Age:  58 y.o., Sex:  male  Medical Record #: [id number]  Today's Date: 1/27/2024     Precautions  Precautions: (P) Fall Risk  Comments: (P) low    Assessment  Patient is 58 y.o. male with a diagnosis of Hypotension abdominal pain.Pain lives at home alone and is mod active.Pt is safe with bed mob,transfers ,ambulation and stairs.He has no equipment needs.     Plan    DC Equipment Recommendations: (P) None  Discharge Recommendations: (P) Anticipate that the patient will have no further physical therapy needs after discharge from the hospital        Objective       01/27/24 1300   Charge Group   PT Evaluation PT Evaluation Mod   Total Time Spent   PT Evaluation Time Spent (Mins) 30   Initial Contact Note    Initial Contact Note Order Received and Verified, Physical Therapy Evaluation in Progress with Full Report to Follow.   Precautions   Precautions Fall Risk   Comments low   Prior Living Situation   Prior Services Home-Independent   Housing / Facility Mobile Home   Steps Into Home 5   Equipment Owned Front-Wheel Walker;Single Point Cane   Lives with - Patient's Self Care Capacity Alone and Able to Care For Self   Comments AFO L   Prior Level of Functional Mobility   Bed Mobility Independent   Transfer Status Independent   Ambulation Independent   Ambulation Distance community   Assistive Devices Used Front-Wheel Walker   Stairs Independent   Cognition    Cognition / Consciousness WDL   Passive ROM Lower Body   Passive ROM Lower Body WDL   Active ROM Lower Body    Active ROM Lower Body  WDL   Strength Lower Body   Lower Body Strength  WDL   Coordination Upper Body   Coordination WDL   Coordination Lower Body    Coordination Lower Body  WDL   Balance Assessment   Sitting Balance (Static) Good   Sitting Balance (Dynamic) Good   Standing Balance (Static) Fair +   Standing Balance (Dynamic) Fair +   Weight Shift Sitting Good   Weight Shift Standing  Good   Bed Mobility    Supine to Sit Modified Independent   Sit to Supine Modified Independent   Scooting Modified Independent   Gait Analysis   Gait Level Of Assist Supervised   Assistive Device Front Wheel Walker   Distance (Feet) 150   # of Times Distance was Traveled 1   Deviation Trendelenberg   # of Stairs Climbed 2   Level of Assist with Stairs Supervised   Weight Bearing Status full   Functional Mobility   Sit to Stand Supervised   Bed, Chair, Wheelchair Transfer Supervised   Transfer Method Stand Step   How much difficulty does the patient currently have...   Turning over in bed (including adjusting bedclothes, sheets and blankets)? 4   Sitting down on and standing up from a chair with arms (e.g., wheelchair, bedside commode, etc.) 4   Moving from lying on back to sitting on the side of the bed? 4   How much help from another person does the patient currently need...   Moving to and from a bed to a chair (including a wheelchair)? 4   Need to walk in a hospital room? 4   Climbing 3-5 steps with a railing? 4   6 clicks Mobility Score 24   Activity Tolerance   Sitting Edge of Bed 5   Standing 10   Patient / Family Goals    Patient / Family Goal #1 home   Anticipated Discharge Equipment and Recommendations   DC Equipment Recommendations None   Discharge Recommendations Anticipate that the patient will have no further physical therapy needs after discharge from the hospital   Interdisciplinary Plan of Care Collaboration   IDT Collaboration with  Nursing   Session Information   Date / Session Number  1/27   Priority 0

## 2024-01-27 NOTE — PROGRESS NOTES
OVERNIGHT HOSPITALIST:    Paged by nursing Staff reporting blood pressure of 84/45.  Patient currently receiving LR at 83 MLS per hour.  Reviewed his chart.  He was previously hypotensive this morning, the lowest blood pressure was 49/29 and received 30 mg/kg IV fluid resuscitation with subsequent blood pressure normal.  He has had lower blood pressure since around 8 PM this evening.    I added 1 L bolus of normal saline, his blood pressure improved to 106/60.  Will continue to closely monitor.

## 2024-01-27 NOTE — PROGRESS NOTES
Telemetry Shift Summary     Rhythm NSR  HR Range 78-86  Ectopy r PAC  Measurements  0.18/ 0.08/ 0.38  Per strip printed 1600     Normal Values  Rhythm SR  HR Range    Measurements 0.12-0.20 / 0.06-0.10  / 0.30-0.52

## 2024-01-27 NOTE — CARE PLAN
The patient is Stable - Low risk of patient condition declining or worsening    Shift Goals  Clinical Goals: BP Management  Patient Goals: Comfort    Progress made toward(s) clinical / shift goals:    Problem: Pain - Standard  Goal: Alleviation of pain or a reduction in pain to the patient’s comfort goal  Outcome: Progressing     Problem: Knowledge Deficit - Standard  Goal: Patient and family/care givers will demonstrate understanding of plan of care, disease process/condition, diagnostic tests and medications  Outcome: Progressing     Problem: Skin Integrity  Goal: Skin integrity is maintained or improved  Outcome: Progressing     Problem: Fall Risk  Goal: Patient will remain free from falls  Outcome: Progressing       Patient is not progressing towards the following goals:

## 2024-01-27 NOTE — PROGRESS NOTES
Telemetry Shift Summary     Rhythm: SR  Rate: 60s-80s  Measurements: 0.18/0.08/0.42  Ectopy (reported by Monitor Tech): rare PVC/PAC     Normal Values  Rhythm: Sinus  HR:   Measurements: 0.12-0.20/0.06-0.10/0.30-0.52

## 2024-01-28 VITALS
RESPIRATION RATE: 16 BRPM | OXYGEN SATURATION: 95 % | TEMPERATURE: 97.5 F | WEIGHT: 158.07 LBS | BODY MASS INDEX: 21.41 KG/M2 | DIASTOLIC BLOOD PRESSURE: 58 MMHG | HEIGHT: 72 IN | HEART RATE: 76 BPM | SYSTOLIC BLOOD PRESSURE: 100 MMHG

## 2024-01-28 LAB
GLUCOSE BLD STRIP.AUTO-MCNC: 108 MG/DL (ref 65–99)
GLUCOSE BLD STRIP.AUTO-MCNC: 242 MG/DL (ref 65–99)
GLUCOSE BLD STRIP.AUTO-MCNC: 64 MG/DL (ref 65–99)
GLUCOSE BLD STRIP.AUTO-MCNC: 85 MG/DL (ref 65–99)

## 2024-01-28 PROCEDURE — 700101 HCHG RX REV CODE 250: Performed by: HOSPITALIST

## 2024-01-28 PROCEDURE — 99239 HOSP IP/OBS DSCHRG MGMT >30: CPT | Performed by: INTERNAL MEDICINE

## 2024-01-28 PROCEDURE — 94760 N-INVAS EAR/PLS OXIMETRY 1: CPT

## 2024-01-28 PROCEDURE — A9270 NON-COVERED ITEM OR SERVICE: HCPCS | Performed by: HOSPITALIST

## 2024-01-28 PROCEDURE — 97530 THERAPEUTIC ACTIVITIES: CPT

## 2024-01-28 PROCEDURE — 97116 GAIT TRAINING THERAPY: CPT

## 2024-01-28 PROCEDURE — 700102 HCHG RX REV CODE 250 W/ 637 OVERRIDE(OP): Performed by: INTERNAL MEDICINE

## 2024-01-28 PROCEDURE — 700102 HCHG RX REV CODE 250 W/ 637 OVERRIDE(OP): Performed by: HOSPITALIST

## 2024-01-28 PROCEDURE — A9270 NON-COVERED ITEM OR SERVICE: HCPCS | Performed by: INTERNAL MEDICINE

## 2024-01-28 PROCEDURE — 82962 GLUCOSE BLOOD TEST: CPT | Mod: 91

## 2024-01-28 RX ORDER — SUCRALFATE 1 G/1
1 TABLET ORAL EVERY 6 HOURS PRN
Qty: 120 TABLET | Refills: 0 | Status: CANCELLED | OUTPATIENT
Start: 2024-01-28

## 2024-01-28 RX ORDER — NITROFURANTOIN 25; 75 MG/1; MG/1
100 CAPSULE ORAL 2 TIMES DAILY WITH MEALS
Status: DISCONTINUED | OUTPATIENT
Start: 2024-01-28 | End: 2024-01-28 | Stop reason: HOSPADM

## 2024-01-28 RX ORDER — NITROFURANTOIN 25; 75 MG/1; MG/1
100 CAPSULE ORAL 2 TIMES DAILY
Qty: 20 CAPSULE | Refills: 0 | Status: CANCELLED | OUTPATIENT
Start: 2024-01-28

## 2024-01-28 RX ORDER — NITROFURANTOIN 25; 75 MG/1; MG/1
100 CAPSULE ORAL 2 TIMES DAILY WITH MEALS
Status: DISCONTINUED | OUTPATIENT
Start: 2024-01-28 | End: 2024-01-28

## 2024-01-28 RX ORDER — SUCRALFATE 1 G/1
1 TABLET ORAL EVERY 6 HOURS
Qty: 120 TABLET | Refills: 0 | Status: SHIPPED | OUTPATIENT
Start: 2024-01-28

## 2024-01-28 RX ORDER — GABAPENTIN 300 MG/1
300 CAPSULE ORAL 3 TIMES DAILY
Qty: 90 CAPSULE | Refills: 0 | Status: SHIPPED | OUTPATIENT
Start: 2024-01-28

## 2024-01-28 RX ORDER — GABAPENTIN 300 MG/1
300 CAPSULE ORAL 3 TIMES DAILY
Qty: 90 CAPSULE | Refills: 0 | Status: CANCELLED | OUTPATIENT
Start: 2024-01-28

## 2024-01-28 RX ORDER — METOCLOPRAMIDE 10 MG/1
10 TABLET ORAL 4 TIMES DAILY PRN
Qty: 60 TABLET | Refills: 0 | Status: SHIPPED | OUTPATIENT
Start: 2024-01-28

## 2024-01-28 RX ORDER — ACETAMINOPHEN 325 MG/1
650 TABLET ORAL EVERY 6 HOURS PRN
Status: DISCONTINUED | OUTPATIENT
Start: 2024-01-28 | End: 2024-01-28 | Stop reason: HOSPADM

## 2024-01-28 RX ADMIN — POLYETHYLENE GLYCOL 3350 1 PACKET: 17 POWDER, FOR SOLUTION ORAL at 12:03

## 2024-01-28 RX ADMIN — DEXTROSE MONOHYDRATE 25 G: 25 INJECTION, SOLUTION INTRAVENOUS at 06:28

## 2024-01-28 RX ADMIN — SUCRALFATE 1 G: 1 TABLET ORAL at 06:20

## 2024-01-28 RX ADMIN — NITROFURANTOIN MONOHYDRATE/MACROCRYSTALLINE 100 MG: 25; 75 CAPSULE ORAL at 16:17

## 2024-01-28 RX ADMIN — INSULIN LISPRO 5 UNITS: 100 INJECTION, SOLUTION INTRAVENOUS; SUBCUTANEOUS at 12:02

## 2024-01-28 RX ADMIN — GABAPENTIN 300 MG: 300 CAPSULE ORAL at 12:06

## 2024-01-28 RX ADMIN — ACETAMINOPHEN 650 MG: 325 TABLET ORAL at 08:22

## 2024-01-28 RX ADMIN — AMOXICILLIN 500 MG: 250 CAPSULE ORAL at 14:09

## 2024-01-28 RX ADMIN — AMOXICILLIN 500 MG: 250 CAPSULE ORAL at 06:20

## 2024-01-28 RX ADMIN — POLYETHYLENE GLYCOL 3350 1 PACKET: 17 POWDER, FOR SOLUTION ORAL at 06:20

## 2024-01-28 RX ADMIN — SUCRALFATE 1 G: 1 TABLET ORAL at 12:06

## 2024-01-28 RX ADMIN — GABAPENTIN 300 MG: 300 CAPSULE ORAL at 06:20

## 2024-01-28 RX ADMIN — METOCLOPRAMIDE 10 MG: 10 TABLET ORAL at 12:06

## 2024-01-28 RX ADMIN — METOCLOPRAMIDE 10 MG: 10 TABLET ORAL at 06:20

## 2024-01-28 RX ADMIN — TAMSULOSIN HYDROCHLORIDE 0.4 MG: 0.4 CAPSULE ORAL at 08:22

## 2024-01-28 RX ADMIN — INSULIN LISPRO 3 UNITS: 100 INJECTION, SOLUTION INTRAVENOUS; SUBCUTANEOUS at 12:00

## 2024-01-28 ASSESSMENT — GAIT ASSESSMENTS
DISTANCE (FEET): 40
DEVIATION: TRENDELENBERG
GAIT LEVEL OF ASSIST: SUPERVISED
ASSISTIVE DEVICE: FRONT WHEEL WALKER

## 2024-01-28 ASSESSMENT — COGNITIVE AND FUNCTIONAL STATUS - GENERAL
STANDING UP FROM CHAIR USING ARMS: A LITTLE
SUGGESTED CMS G CODE MODIFIER MOBILITY: CK
MOVING TO AND FROM BED TO CHAIR: A LITTLE
MOBILITY SCORE: 18
WALKING IN HOSPITAL ROOM: A LITTLE
CLIMB 3 TO 5 STEPS WITH RAILING: A LITTLE
SUGGESTED CMS G CODE MODIFIER DAILY ACTIVITY: CH
MOVING FROM LYING ON BACK TO SITTING ON SIDE OF FLAT BED: A LOT
DAILY ACTIVITIY SCORE: 24

## 2024-01-28 ASSESSMENT — FIBROSIS 4 INDEX: FIB4 SCORE: 0.81

## 2024-01-28 ASSESSMENT — PAIN DESCRIPTION - PAIN TYPE: TYPE: ACUTE PAIN

## 2024-01-28 NOTE — PROGRESS NOTES
Received report from Tabitha CASPER. Pt resting in bed. Pt A&O x4, on RA. Pt denies pain at this time. Pt continued on IV fluids, LR. Pt updated with POC which includes pain meds and blood glucose checks. Call light within reach, bed in lowest position, bed alarm on, fall precautions in place, all needs met at this time.    - pulse ox on, q1 hr rounding in progress.  - pt not very cooperative in some of his cares.   -2200 refused pulse ox at this time, educated.

## 2024-01-28 NOTE — PROGRESS NOTES
Hypoglycemia Intervention    Hypoglycemia protocol intervention:  Blood glucose 64 at 0619.  Intervention: 8 oz of fruit juice  at 0630  Repeat blood glucose 0645 at 85.  Intervention: 4 oz of fruit juice   Additional interventions needed: Pt asked for snacks  Dr. Macdonald notified of the above at 0630; acknowledged.   Bld sugar rechecked by AM nurse Tabitha CASPER, reported.

## 2024-01-28 NOTE — THERAPY
"Occupational Therapy   Initial Evaluation     Patient Name: Christoph Taylor  Age:  58 y.o., Sex:  male  Medical Record #: [id number]  Today's Date: 1/27/2024     Precautions  Precautions: Fall Risk  Comments: low    Assessment  Patient is a 58 y.o. male who presented to the ED with abdominal pain and developed hypotension while in the ED. Pt is modified independent with basic ADLs at baseline. During OT eval, pt completed lower body dressing, toileting and toilet transfers with AE and without assistance. Pt appears to be at his functional baseline. Educated pt on HH therapy but he reports he has not found that to be helpful in the past.     Patient will not be actively followed for occupational therapy services at this time, however may be seen if requested by physician for 1 more visit during this hospitalization to address any discharge or equipment needs.        Plan    Occupational Therapy Initial Treatment Plan   Duration: Discharge Needs Only    DC Equipment Recommendations: None  Discharge Recommendations: Anticipate that the patient will have no further occupational therapy needs after discharge from the hospital     Subjective    \"I feel like I'm wasting away in this bed.\"     Objective       01/27/24 1158   Prior Living Situation   Prior Services Home-Independent   Housing / Facility Mobile Home   Steps Into Home 5   Bathroom Set up Shower Chair   Equipment Owned Front-Wheel Walker;Wheelchair;Tub / Shower Seat  (shoe horn, AFO)   Lives with - Patient's Self Care Capacity Alone and Able to Care For Self   Prior Level of ADL Function   Self Feeding Independent   Grooming / Hygiene Independent   Bathing Independent   Dressing Independent   Toileting Independent   Prior Level of IADL Function   Medication Management Independent   Laundry Independent   Kitchen Mobility Independent   Finances Independent   Home Management Independent   Shopping Independent   Prior Level Of Mobility Uses Wheel Chair for in Home " Mobility;Independent With Device in Home  (sometimes uses FWW, other times uses w/c for mobility in house, must have AFO on to walk)   Driving / Transportation Driving Independent   Precautions   Precautions Fall Risk  (LLE AFO)   Vitals   O2 Delivery Device None - Room Air   Pain 0 - 10 Group   Therapist Pain Assessment During Activity;Nurse Notified  (no c/o pain during activity)   Cognition    Level of Consciousness Alert   Active ROM Upper Body   Active ROM Upper Body  X   Dominant Hand Right   Comments limitations in full finger extension B hands, partial amputation right thumb   Strength Upper Body   Comments weakness B hands, uses a needle nose pliers to help him grasp tongue of his shoes and tighten shoe laces   Sensation Upper Body   Comments reports some neuropathy in his hands   Coordination Upper Body   Comments functional use of hands impacted by impaired ROM, sensation and strength   Balance Assessment   Sitting Balance (Static) Good   Sitting Balance (Dynamic) Normal   Standing Balance (Static) Fair   Standing Balance (Dynamic) Fair   Weight Shift Sitting Good   Weight Shift Standing Good   Comments FWW   Bed Mobility    Supine to Sit Supervised   Sit to Supine Supervised   Scooting Supervised   ADL Assessment   Eating Independent   Lower Body Dressing Standby Assist  (socks, shoes, pants, AFO with use of shoe horn)   Toileting Standby Assist  (jiménez in place, BM on toilet)   How much help from another person does the patient currently need...   Putting on and taking off regular lower body clothing? 4   Bathing (including washing, rinsing, and drying)? 4   Toileting, which includes using a toilet, bedpan, or urinal? 4   Putting on and taking off regular upper body clothing? 4   Taking care of personal grooming such as brushing teeth? 4   Eating meals? 4   6 Clicks Daily Activity Score 24   Functional Mobility   Sit to Stand Supervised   Toilet Transfers Supervised   Mobility walked to/from bathroom  with FWW   Education Group   Role of Occupational Therapist Patient Response Patient;Acceptance;Explanation;Verbal Demonstration   Occupational Therapy Initial Treatment Plan    Duration Discharge Needs Only   Problem List   Problem List None  (at functional baseline)

## 2024-01-28 NOTE — PROGRESS NOTES
"Hospital Medicine Daily Progress Note    Date of Service  1/27/2024    Chief Complaint  Abdominal pain    Hospital Course  Christoph Taylor is a 58 y.o. male admitted 1/26/2024 with abdominal pain and transient hypotension in ER. He was recently DC from [facility] (1/20-23) and presented there again 1/24, 1/25, 1/26. Similar episode in 8/23    Interval Problem Update  Still c/o severe LUQ \"burning pain\" no exacerbating or relieving factors, not sure why resolved in August. No low BP's today. + 3 BM's earlier, clearing of most of stool except LUQ.    I have discussed this patient's plan of care and discharge plan at IDT rounds today with Case Management, Nursing, Nursing leadership, and other members of the IDT team.    Consultants/Specialty  none    Code Status  Full Code    Disposition  The patient is not medically cleared for discharge to home or a post-acute facility.  Anticipate discharge to: home with organized home healthcare and close outpatient follow-up    I have placed the appropriate orders for post-discharge needs.    Review of Systems  Review of Systems   Respiratory:  Negative for shortness of breath.    Cardiovascular:  Negative for chest pain.   Gastrointestinal:  Positive for abdominal pain and nausea. Negative for blood in stool, constipation and vomiting.   Genitourinary:  Negative for dysuria.   Musculoskeletal:  Positive for back pain (chronic intermittent, old T12 injury).   Neurological:  Positive for weakness. Negative for dizziness.   Psychiatric/Behavioral:  The patient is nervous/anxious.         Physical Exam  Temp:  [36.1 °C (97 °F)-36.7 °C (98.1 °F)] 36.1 °C (97 °F)  Pulse:  [68-86] 73  Resp:  [16-18] 17  BP: ()/(43-90) 156/90  SpO2:  [90 %-98 %] 94 %    Physical Exam  Vitals and nursing note reviewed.   Constitutional:       General: He is in acute distress (anxious and uncomfortable).      Appearance: He is normal weight.      Comments: Jody and dishevled   HENT:      Head: " Normocephalic and atraumatic.      Nose: Nose normal.      Mouth/Throat:      Mouth: Mucous membranes are moist.   Eyes:      Extraocular Movements: Extraocular movements intact.      Pupils: Pupils are equal, round, and reactive to light.   Cardiovascular:      Rate and Rhythm: Normal rate and regular rhythm.   Pulmonary:      Effort: Pulmonary effort is normal.      Breath sounds: Normal breath sounds.   Abdominal:      General: Abdomen is flat. Bowel sounds are normal. There is no distension.      Palpations: Abdomen is soft. There is no mass.      Tenderness: There is abdominal tenderness (LUQ> L epigastric). There is no guarding or rebound.   Musculoskeletal:      Right lower leg: No edema.      Left lower leg: No edema.   Skin:     Comments: Eschar L elbow, no warmth or drainage, 1.5 cm area pink @ wound   Neurological:      General: No focal deficit present.      Mental Status: He is alert and oriented to person, place, and time.   Psychiatric:      Comments: Very anxious, dysthymic         Fluids    Intake/Output Summary (Last 24 hours) at 1/27/2024 1758  Last data filed at 1/27/2024 1600  Gross per 24 hour   Intake 1020 ml   Output 1375 ml   Net -355 ml       Laboratory  Recent Labs     01/26/24  0444 01/26/24  0925 01/27/24  0139   WBC 6.9 8.8 7.9   RBC 4.72 4.73 4.00*   HEMOGLOBIN 13.4* 13.2* 11.1*   HEMATOCRIT 37.5* 38.8* 33.7*   MCV 79.4* 82.0 84.3   MCH 28.4 27.9 27.8   MCHC 35.7 34.0 32.9   RDW 36.5 38.3 39.9   PLATELETCT 234 260 210   MPV 8.7* 8.8* 8.7*     Recent Labs     01/26/24  0444 01/26/24  0925 01/27/24  0139   SODIUM 131* 134* 138   POTASSIUM 3.7 3.4* 3.6   CHLORIDE 94* 94* 101   CO2 26 31 32   GLUCOSE 286* 221* 109*   BUN 13 13 14   CREATININE 0.53 0.65 0.69   CALCIUM 8.5 8.3* 7.6*                   Imaging  [id number] VIEW   Final Result      DX-CHEST-PORTABLE (1 VIEW)   Final Result      No evidence of acute cardiopulmonary process.      CT-RENAL COLIC EVALUATION(A/P W/O)   Final Result          1.  Air-filled prominence of small bowel, appearance suggests ileus and/or enteritis. Radiographic follow-up to resolution recommended as clinically appropriate.   2.  Left hydronephrosis versus extra renal pelvis morphology, similar to prior study. No visualized obstructing stone is appreciated.   3.  Left nephrolithiasis   4.  Horseshoe kidney morphology.   5.  Diverticulosis   6.  Trace pericardial effusion      DX-ABDOMEN COMPLETE WITH AP OR PA CXR   Final Result         1.  No acute cardiopulmonary disease is evident.   2.  Left mid abdominal calcification, appearance favoring urinary calculus.   3.  Moderate quantity of stool in the colon, appearance compatible changes of constipation, otherwise nonspecific bowel gas pattern.           Assessment/Plan  * Hypotension- (present on admission)  Assessment & Plan  Pre-syncopal in the emergency room, blood pressure as low as the 40's per ER physician (cannot see any opioids given)  DDx includes infection, vasovagal, orthostasis- BP has been stable since admit        Functional gait abnormality- (present on admission)  Assessment & Plan  Wears AFO splint, uses walker and WC    Elbow abrasion- (present on admission)  Assessment & Plan  2/2 recent fall- does not appear infected    Constipation- (present on admission)  Assessment & Plan  improved    Diabetic toe ulcer (HCC)- (present on admission)  Assessment & Plan  Wound care    Urinary retention- (present on admission)  Assessment & Plan  D/c jiménez 2/2 UTI  Resume flomax    Acute cystitis without hematuria- (present on admission)  Assessment & Plan  Enterococcus  Change to PO ampicillin/amoxicillin    Abdominal pain- (present on admission)  Assessment & Plan  Recurrent- last episode in August (admitted 8/3 but returned 2 more times after in August to ED)  Current episode admitted [facility]/20 [facility] (KUB stool, CT negative) then returned to ER 1/24,25,26 (admitted here)  KUB here still lots of stool, despite  multiple BMs still has large amount of LUQ stool, and gas, pain is LUQ-  Continue Miralax, add Reglan PO sucralfate  No indication for urgent endoscopy.  Has been on intermittent chronic oxycodone PDMP reviewed and Rx's are not contiguous    Hyponatremia- (present on admission)  Assessment & Plan  Suspect hypovolemic hyponatremia  IV fluids  improved    Uncontrolled type 2 diabetes mellitus with hyperglycemia (HCC)- (present on admission)  Assessment & Plan  Patient uses only insulin sliding scale at home, no long acting insulin  I ordered medium intensity sliding scale         VTE prophylaxis:    enoxaparin ppx      I have performed a physical exam and reviewed and updated ROS and Plan today (1/27/2024). In review of yesterday's note (1/26/2024), there are no changes except as documented above.

## 2024-01-28 NOTE — PROGRESS NOTES
Bedside report received from PENNIE Vargas. Assumed care of patient. Daily plan of care discussed. Pt resting comfortably in bed with no signs of distress noted. Breathing even and unlabored.  Call light within reach. Bed locked in lowest position. Plan of care on going.    Patient was hypoglyemic at 0619, last blood glucose - 108. Message sent to MD with updated BG and asked if ok with holding his additional 5 units of lispro this morning.       1510: Crowley catheter removed.

## 2024-01-28 NOTE — CARE PLAN
The patient is Stable - Low risk of patient condition declining or worsening    Shift Goals  Clinical Goals: Monitor pt's BP; Pain will be tolerable at end of shift.  Patient Goals: Sleep and rest this shift.  Family Goals: na    Progress made toward(s) clinical / shift goals:      Pt has no reported pain during shift; no nausea and vomiting;     Patient is not progressing towards the following goals:

## 2024-01-28 NOTE — PROGRESS NOTES
4 Eyes Skin Assessment Completed by PENNIE Lu and PENNIE Lockwood.    Head WDL  Ears WDL  Nose WDL  Mouth WDL  Neck WDL  Breast/Chest Refused to pull gown up  Shoulder Blades WDL  Spine Scar  (R) Arm/Elbow/Hand Hands dry  (L) Arm/Elbow/Hand Abrasion, Hands dry  Abdomen Refused to pull gown up  Groin Refused to pull pants down.   Scrotum/Coccyx/Buttocks Redness and Blanching Left side  (R) Leg refused  (L) Leg Scab Refused to pull pants down to see up past shin   (R) Heel/Foot/Toe Refused to take socks off  (L) Heel/Foot/Toe Refused to take socks off    Devices In Places Pulse Ox      Interventions In Place Pillows    Possible Skin Injury Yes    Pictures Uploaded Into Epic N/A  Wound Consult Placed N/A  RN Wound Prevention Protocol Ordered No

## 2024-01-28 NOTE — CARE PLAN
The patient is Stable - Low risk of patient condition declining or worsening    Shift Goals  Clinical Goals: remain fall free, pain controlled to less than 4, Monitor BG levels  Patient Goals: Relax and updated on POC  Family Goals: na    Progress made toward(s) clinical / shift goals:  Patient was hypoglycemic during morning med pass, has now been over 200BG. Tolerated diet. Have a medium sized BM. No pain reported.     Patient is not progressing towards the following goals:

## 2024-01-29 LAB
BACTERIA UR CULT: ABNORMAL
SIGNIFICANT IND 70042: ABNORMAL
SITE SITE: ABNORMAL
SOURCE SOURCE: ABNORMAL

## 2024-01-29 NOTE — PROGRESS NOTES
Educated patient on discharge instructions, rx medications  and follow up with PCP,   JENNY TariqROlgaN.  [address] [address] [phone]    Schedule an appointment as soon as possible for a visit in 3 days  For recheck.    patient voiced understanding . VS stable /58   Pulse 76   Temp 36.4 °C (97.5 °F) (Temporal)   Resp 16   Ht 1.829 m (6')   Wt 71.7 kg (158 lb 1.1 oz)   SpO2 95%   BMI 21.44 kg/m²    Patient alert and appropriate. All questions and concerns addressed. No further questions or concerns at this time. Copy of discharge paperwork provided.  Patient out of department via WC in stable condition.

## 2024-01-29 NOTE — DISCHARGE SUMMARY
Discharge Summary    CHIEF COMPLAINT ON ADMISSION  Chief Complaint   Patient presents with    Abdominal Pain     Pt states that he had frequent abdominal pain mostly on the epigastric area for the past weeks. He had frequent visit at [facility] with same complain.     Nausea       Reason for Admission  EMS     Admission Date  1/26/2024    CODE STATUS  Full Code    HPI & HOSPITAL COURSE  This is a 58 y.o. male here with urinary tract infection and abdominal pain, urine grew out Klebsiella resistant to ampicillin, amoxicillin but sensitive to Macrobid.  Patient had Crowley catheter placed on admission due to inability to void, normally he takes Flomax but had transient low blood pressure while in the emergency room so this was withheld.  Patient was able to void after Crowley removed and prior to discharge.  Patient has a longstanding history of T12 injury and chronic intermittent low back pain for which he intermittently takes oxycodone.  P DMP was reviewed and shows only intermittent prescriptions, nothing continuous.  Patient had similar episode of left upper quadrant pain in August which seemed to resolve spontaneously.  Imaging studies here as well as at neighboring facility on this occasion show constipation with retained stool throughout the colon.  After addition of Reglan and MiraLAX he had multiple bowel movements and his abdominal pain resolved.  Patient was noted to be somewhat frail but at baseline mobilizes with walker and wheelchair.  He has falls when he does not use his walker as he is supposed to but was cleared by physical therapy.      No notes on file    Therefore, he is discharged in good and stable condition to home with close outpatient follow-up.    The patient met 2-midnight criteria for an inpatient stay at the time of discharge.    Discharge Date  1/28/2024    FOLLOW UP ITEMS POST DISCHARGE  PCP in 10 days have you noticed like none of these patients have doctors anymore they are all  midlevel's    DISCHARGE DIAGNOSES  Principal Problem:    Hypotension (POA: Yes)  Active Problems:    Uncontrolled type 2 diabetes mellitus with hyperglycemia (HCC) (POA: Yes)    Hyponatremia (POA: Yes)    Abdominal pain (POA: Yes)    Acute cystitis without hematuria (POA: Yes)    Urinary retention (POA: Yes)    Diabetic toe ulcer (HCC) (POA: Yes)    Constipation (POA: Yes)    Elbow abrasion (POA: Yes)    Functional gait abnormality (POA: Yes)  Resolved Problems:    * No resolved hospital problems. *      FOLLOW UP  No future appointments.  FABBY Tariq.P.R.N.  [address]  [facility] [address]  [phone]    Schedule an appointment as soon as possible for a visit in 3 days  For recheck.      MEDICATIONS ON DISCHARGE     Medication List        START taking these medications        Instructions   gabapentin 300 MG Caps  Commonly known as: Neurontin   Take 1 Capsule by mouth 3 times a day.  Dose: 300 mg     ketorolac 10 MG Tabs  Commonly known as: Toradol   Take 1 Tablet by mouth 3 times a day as needed for Moderate Pain. With food  Dose: 10 mg     metoclopramide 10 MG Tabs  Commonly known as: Reglan   Take 1 Tablet by mouth 4 times a day as needed (abdominal pain, nasuea).  Dose: 10 mg     ondansetron 4 MG Tbdp  Commonly known as: Zofran ODT   Take 1 Tablet by mouth every 6 hours as needed for Nausea/Vomiting.  Dose: 4 mg     sucralfate 1 GM Tabs  Commonly known as: Carafate   Take 1 Tablet by mouth every 6 hours.  Dose: 1 g            CONTINUE taking these medications        Instructions   insulin lispro 100 UNIT/ML  Commonly known as: HumaLOG   Inject 8-20 Units under the skin 3 times a day before meals. Sliding Scale  Dose: 8-20 Units     tamsulosin 0.4 MG capsule  Commonly known as: Flomax   Take 0.4 mg by mouth every day.  Dose: 0.4 mg            STOP taking these medications      azithromycin 250 MG Tabs  Commonly known as: Zithromax     dicyclomine 10 MG Caps  Commonly known as: Bentyl      guaiFENesin  MG Tb12  Commonly known as: Mucinex              Allergies  No Known Allergies    DIET  Orders Placed This Encounter   Procedures    Diet Order Diet: Consistent CHO (Diabetic)     Standing Status:   Standing     Number of Occurrences:   1     Order Specific Question:   Diet:     Answer:   Consistent CHO (Diabetic) [4]       ACTIVITY  As tolerated.  Weight bearing as tolerated    CONSULTATIONS  none    PROCEDURES  none    LABORATORY  Lab Results   Component Value Date    SODIUM 138 01/27/2024    POTASSIUM 3.6 01/27/2024    CHLORIDE 101 01/27/2024    CO2 32 01/27/2024    GLUCOSE 109 (H) 01/27/2024    BUN 14 01/27/2024    CREATININE 0.69 01/27/2024        Lab Results   Component Value Date    WBC 7.9 01/27/2024    HEMOGLOBIN 11.1 (L) 01/27/2024    HEMATOCRIT 33.7 (L) 01/27/2024    PLATELETCT 210 01/27/2024        Total time of the discharge process exceeds 35 minutes.

## 2024-01-29 NOTE — THERAPY
Physical Therapy   Daily Treatment     Patient Name: Christoph Taylor  Age:  58 y.o., Sex:  male  Medical Record #: [id number]  Today's Date: 1/28/2024          Assessment  Pt is safe to transfer and ambulate with his L  AFO donned and he has a good safe gait using his FWW for mobility . Pt has a mild trendelenburg gait . Pt reports no abdominal pain with  sitting , lying or with movement.       Plan    Treatment Plan Status:    Type of Treatment:    Treatment Frequency:    Treatment Duration: Other (See Comments)    DC Equipment Recommendations: None  Discharge Recommendations: Anticipate that the patient will have no further physical therapy needs after discharge from the hospital      Subjective  Pt is pleasant and willing to work with PT this PM       Objective     01/28/24 1602   Pain 0 - 10 Group   Therapist Pain Assessment Post Activity Pain Same as Prior to Activity;Nurse Notified;0   Cognition    Cognition / Consciousness WDL   Level of Consciousness Alert   Passive ROM Lower Body   Comments pt wear a AFO with a reinforced plate over the dorsum of the ankle   Other Treatments   Other Treatments Provided nursg requested a recheck of pt safety with ambulation while using his AFO prior toD/C   Balance   Sitting Balance (Static) Good   Sitting Balance (Dynamic) Good   Standing Balance (Static) Fair +   Standing Balance (Dynamic) Fair +   Weight Shift Sitting Good   Weight Shift Standing Good   Bed Mobility    Supine to Sit Modified Independent   Sit to Supine Modified Independent   Scooting Independent   Gait Analysis   Gait Level Of Assist Supervised   Assistive Device Front Wheel Walker   Distance (Feet) 40   # of Times Distance was Traveled 1   Deviation Trendelenberg   Level of Assist with Stairs Supervised   Comments pt is safe walking with his L Le AFO donned and pt will not have any further pt needs from acute care PT .   Functional Mobility   Sit to Stand Supervised   Bed, Chair, Wheelchair Transfer  Supervised   Transfer Method Stand Step   Mobility from EOB, in room and back to EOB   Skilled Intervention Verbal Cuing   Comments pt tends to move quickly and is reminded to slow down for safety due to L hip weakness.   Anticipated Discharge Equipment and Recommendations   DC Equipment Recommendations None   Discharge Recommendations Anticipate that the patient will have no further physical therapy needs after discharge from the hospital   Session Information   Date / Session Number  1/28-2

## 2024-02-06 ENCOUNTER — APPOINTMENT (OUTPATIENT)
Dept: MEDICAL GROUP | Facility: LAB | Age: 59
End: 2024-02-06
Payer: COMMERCIAL

## 2024-02-13 ENCOUNTER — APPOINTMENT (OUTPATIENT)
Dept: MEDICAL GROUP | Facility: LAB | Age: 59
End: 2024-02-13
Payer: COMMERCIAL

## 2024-02-20 ENCOUNTER — TELEPHONE (OUTPATIENT)
Dept: MEDICAL GROUP | Facility: LAB | Age: 59
End: 2024-02-20
Payer: COMMERCIAL

## 2024-02-20 NOTE — TELEPHONE ENCOUNTER
Phone call OK to leave a detailed message    2. Patient is requesting lab results     3. Confirmed results are in chart. Patient advised they will be contacted once interpreted by provider.

## 2024-02-25 ENCOUNTER — APPOINTMENT (OUTPATIENT)
Dept: RADIOLOGY | Facility: MEDICAL CENTER | Age: 59
End: 2024-02-25
Attending: EMERGENCY MEDICINE
Payer: COMMERCIAL

## 2024-02-25 ENCOUNTER — HOSPITAL ENCOUNTER (EMERGENCY)
Facility: MEDICAL CENTER | Age: 59
End: 2024-02-26
Attending: EMERGENCY MEDICINE
Payer: COMMERCIAL

## 2024-02-25 VITALS
BODY MASS INDEX: 24.25 KG/M2 | OXYGEN SATURATION: 91 % | HEIGHT: 72 IN | HEART RATE: 87 BPM | DIASTOLIC BLOOD PRESSURE: 83 MMHG | WEIGHT: 179.01 LBS | SYSTOLIC BLOOD PRESSURE: 137 MMHG | RESPIRATION RATE: 18 BRPM | TEMPERATURE: 97.6 F

## 2024-02-25 DIAGNOSIS — T14.8XXA MUSCLE STRAIN: ICD-10-CM

## 2024-02-25 DIAGNOSIS — R33.9 URINARY RETENTION: ICD-10-CM

## 2024-02-25 DIAGNOSIS — N39.0 ACUTE UTI: ICD-10-CM

## 2024-02-25 LAB
ALBUMIN SERPL BCP-MCNC: 3.7 G/DL (ref 3.2–4.9)
ALBUMIN/GLOB SERPL: 1.5 G/DL
ALP SERPL-CCNC: 80 U/L (ref 30–99)
ALT SERPL-CCNC: 16 U/L (ref 2–50)
ANION GAP SERPL CALC-SCNC: 10 MMOL/L (ref 7–16)
APPEARANCE UR: CLEAR
AST SERPL-CCNC: 14 U/L (ref 12–45)
BACTERIA #/AREA URNS HPF: ABNORMAL /HPF
BASOPHILS # BLD AUTO: 0 % (ref 0–1.8)
BASOPHILS # BLD: 0 K/UL (ref 0–0.12)
BILIRUB SERPL-MCNC: 0.3 MG/DL (ref 0.1–1.5)
BILIRUB UR QL STRIP.AUTO: NEGATIVE
BUN SERPL-MCNC: 21 MG/DL (ref 8–22)
CALCIUM ALBUM COR SERPL-MCNC: 9 MG/DL (ref 8.5–10.5)
CALCIUM SERPL-MCNC: 8.8 MG/DL (ref 8.4–10.2)
CHLORIDE SERPL-SCNC: 93 MMOL/L (ref 96–112)
CO2 SERPL-SCNC: 29 MMOL/L (ref 20–33)
COLOR UR: YELLOW
CREAT SERPL-MCNC: 0.9 MG/DL (ref 0.5–1.4)
EOSINOPHIL # BLD AUTO: 0.14 K/UL (ref 0–0.51)
EOSINOPHIL NFR BLD: 2.5 % (ref 0–6.9)
EPI CELLS #/AREA URNS HPF: NEGATIVE /HPF
ERYTHROCYTE [DISTWIDTH] IN BLOOD BY AUTOMATED COUNT: 38 FL (ref 35.9–50)
GFR SERPLBLD CREATININE-BSD FMLA CKD-EPI: 98 ML/MIN/1.73 M 2
GLOBULIN SER CALC-MCNC: 2.5 G/DL (ref 1.9–3.5)
GLUCOSE SERPL-MCNC: 560 MG/DL (ref 65–99)
GLUCOSE UR STRIP.AUTO-MCNC: >=1000 MG/DL
HCT VFR BLD AUTO: 39.1 % (ref 42–52)
HGB BLD-MCNC: 13.5 G/DL (ref 14–18)
IMM GRANULOCYTES # BLD AUTO: 0.01 K/UL (ref 0–0.11)
IMM GRANULOCYTES NFR BLD AUTO: 0.2 % (ref 0–0.9)
KETONES UR STRIP.AUTO-MCNC: NEGATIVE MG/DL
LEUKOCYTE ESTERASE UR QL STRIP.AUTO: NEGATIVE
LYMPHOCYTES # BLD AUTO: 1.46 K/UL (ref 1–4.8)
LYMPHOCYTES NFR BLD: 26.1 % (ref 22–41)
MCH RBC QN AUTO: 28.3 PG (ref 27–33)
MCHC RBC AUTO-ENTMCNC: 34.5 G/DL (ref 32.3–36.5)
MCV RBC AUTO: 82 FL (ref 81.4–97.8)
MICRO URNS: ABNORMAL
MONOCYTES # BLD AUTO: 0.38 K/UL (ref 0–0.85)
MONOCYTES NFR BLD AUTO: 6.8 % (ref 0–13.4)
NEUTROPHILS # BLD AUTO: 3.6 K/UL (ref 1.82–7.42)
NEUTROPHILS NFR BLD: 64.4 % (ref 44–72)
NITRITE UR QL STRIP.AUTO: NEGATIVE
NRBC # BLD AUTO: 0 K/UL
NRBC BLD-RTO: 0 /100 WBC (ref 0–0.2)
PH UR STRIP.AUTO: 6.5 [PH] (ref 5–8)
PLATELET # BLD AUTO: 190 K/UL (ref 164–446)
PMV BLD AUTO: 9.5 FL (ref 9–12.9)
POTASSIUM SERPL-SCNC: 4.4 MMOL/L (ref 3.6–5.5)
PROT SERPL-MCNC: 6.2 G/DL (ref 6–8.2)
PROT UR QL STRIP: 30 MG/DL
RBC # BLD AUTO: 4.77 M/UL (ref 4.7–6.1)
RBC # URNS HPF: ABNORMAL /HPF
RBC UR QL AUTO: ABNORMAL
SODIUM SERPL-SCNC: 132 MMOL/L (ref 135–145)
SP GR UR STRIP.AUTO: 1.01
WBC # BLD AUTO: 5.6 K/UL (ref 4.8–10.8)
WBC #/AREA URNS HPF: ABNORMAL /HPF

## 2024-02-25 PROCEDURE — 87077 CULTURE AEROBIC IDENTIFY: CPT

## 2024-02-25 PROCEDURE — 99285 EMERGENCY DEPT VISIT HI MDM: CPT

## 2024-02-25 PROCEDURE — 80053 COMPREHEN METABOLIC PANEL: CPT

## 2024-02-25 PROCEDURE — 81001 URINALYSIS AUTO W/SCOPE: CPT

## 2024-02-25 PROCEDURE — 87086 URINE CULTURE/COLONY COUNT: CPT

## 2024-02-25 PROCEDURE — A9270 NON-COVERED ITEM OR SERVICE: HCPCS | Performed by: EMERGENCY MEDICINE

## 2024-02-25 PROCEDURE — 51702 INSERT TEMP BLADDER CATH: CPT

## 2024-02-25 PROCEDURE — 85025 COMPLETE CBC W/AUTO DIFF WBC: CPT

## 2024-02-25 PROCEDURE — 51798 US URINE CAPACITY MEASURE: CPT

## 2024-02-25 PROCEDURE — 700102 HCHG RX REV CODE 250 W/ 637 OVERRIDE(OP): Performed by: EMERGENCY MEDICINE

## 2024-02-25 PROCEDURE — 36415 COLL VENOUS BLD VENIPUNCTURE: CPT

## 2024-02-25 PROCEDURE — 96372 THER/PROPH/DIAG INJ SC/IM: CPT

## 2024-02-25 PROCEDURE — 303105 HCHG CATHETER EXTRA

## 2024-02-25 PROCEDURE — 87186 SC STD MICRODIL/AGAR DIL: CPT

## 2024-02-25 PROCEDURE — 93971 EXTREMITY STUDY: CPT | Mod: LT

## 2024-02-25 RX ORDER — IBUPROFEN 600 MG/1
600 TABLET ORAL ONCE
Status: COMPLETED | OUTPATIENT
Start: 2024-02-25 | End: 2024-02-25

## 2024-02-25 RX ORDER — CEPHALEXIN 250 MG/1
500 CAPSULE ORAL ONCE
Status: COMPLETED | OUTPATIENT
Start: 2024-02-26 | End: 2024-02-26

## 2024-02-25 RX ADMIN — INSULIN HUMAN 10 UNITS: 100 INJECTION, SOLUTION PARENTERAL at 23:15

## 2024-02-25 RX ADMIN — IBUPROFEN 600 MG: 600 TABLET, FILM COATED ORAL at 23:15

## 2024-02-25 ASSESSMENT — LIFESTYLE VARIABLES
EVER FELT BAD OR GUILTY ABOUT YOUR DRINKING: NO
AVERAGE NUMBER OF DAYS PER WEEK YOU HAVE A DRINK CONTAINING ALCOHOL: 0
HAVE YOU EVER FELT YOU SHOULD CUT DOWN ON YOUR DRINKING: NO
TOTAL SCORE: 0
CONSUMPTION TOTAL: NEGATIVE
TOTAL SCORE: 0
EVER HAD A DRINK FIRST THING IN THE MORNING TO STEADY YOUR NERVES TO GET RID OF A HANGOVER: NO
ON A TYPICAL DAY WHEN YOU DRINK ALCOHOL HOW MANY DRINKS DO YOU HAVE: 0
DO YOU DRINK ALCOHOL: NO
HAVE PEOPLE ANNOYED YOU BY CRITICIZING YOUR DRINKING: NO
TOTAL SCORE: 0
HOW MANY TIMES IN THE PAST YEAR HAVE YOU HAD 5 OR MORE DRINKS IN A DAY: 0

## 2024-02-25 ASSESSMENT — FIBROSIS 4 INDEX: FIB4 SCORE: 0.81

## 2024-02-25 NOTE — LETTER
3/1/2024               Christoph Taylor  [address]  [address]  [facility] [address]        Dear Christoph (MR#[id number])    As we have been unable to contact you by phone, this letter is sent in regards to your recent visit to the [facility] Emergency Department on 2/25/2024. During the visit, tests were performed to assist the physician in a medical diagnosis. A review of those tests requires that we notify you of the following:    Your urine culture and sensitivity was POSITIVE for a bacteria called Enterococcus faecalis, and further treatment may be necessary. The currently prescribed antibiotic (cephalexin) may not be effective in treating your infection. IF YOU ARE NOT FEELING BETTER PLEASE CONTACT ME AS SOON AS POSSIBLE AT THE NUMBER BELOW.       Thank you for your cooperation in the matter.    Sincerely,  ED Culture Follow-Up Staff  Isiah Orellana, PharmD    [facility], Emergency Department  [address] [place] [id number] [phone] (ED Culture Line)

## 2024-02-26 ENCOUNTER — OFFICE VISIT (OUTPATIENT)
Dept: MEDICAL GROUP | Facility: LAB | Age: 59
End: 2024-02-26
Payer: COMMERCIAL

## 2024-02-26 VITALS
HEIGHT: 72 IN | WEIGHT: 179 LBS | HEART RATE: 86 BPM | SYSTOLIC BLOOD PRESSURE: 126 MMHG | BODY MASS INDEX: 24.24 KG/M2 | DIASTOLIC BLOOD PRESSURE: 68 MMHG | RESPIRATION RATE: 14 BRPM | OXYGEN SATURATION: 95 % | TEMPERATURE: 97.6 F

## 2024-02-26 DIAGNOSIS — M79.672 CHRONIC FOOT PAIN, LEFT: ICD-10-CM

## 2024-02-26 DIAGNOSIS — R33.9 URINARY RETENTION: ICD-10-CM

## 2024-02-26 DIAGNOSIS — Z09 HOSPITAL DISCHARGE FOLLOW-UP: ICD-10-CM

## 2024-02-26 DIAGNOSIS — G89.29 CHRONIC FOOT PAIN, LEFT: ICD-10-CM

## 2024-02-26 DIAGNOSIS — N39.0 ACUTE UTI: ICD-10-CM

## 2024-02-26 DIAGNOSIS — E11.65 UNCONTROLLED TYPE 2 DIABETES MELLITUS WITH HYPERGLYCEMIA (HCC): ICD-10-CM

## 2024-02-26 PROCEDURE — 99214 OFFICE O/P EST MOD 30 MIN: CPT | Performed by: NURSE PRACTITIONER

## 2024-02-26 PROCEDURE — A9270 NON-COVERED ITEM OR SERVICE: HCPCS | Performed by: EMERGENCY MEDICINE

## 2024-02-26 PROCEDURE — 700102 HCHG RX REV CODE 250 W/ 637 OVERRIDE(OP): Performed by: EMERGENCY MEDICINE

## 2024-02-26 PROCEDURE — 3078F DIAST BP <80 MM HG: CPT | Performed by: NURSE PRACTITIONER

## 2024-02-26 PROCEDURE — 3074F SYST BP LT 130 MM HG: CPT | Performed by: NURSE PRACTITIONER

## 2024-02-26 RX ORDER — CEPHALEXIN 500 MG/1
500 CAPSULE ORAL 4 TIMES DAILY
Qty: 28 CAPSULE | Refills: 0 | Status: ACTIVE | OUTPATIENT
Start: 2024-02-26 | End: 2024-03-04

## 2024-02-26 RX ADMIN — CEPHALEXIN 500 MG: 250 CAPSULE ORAL at 00:36

## 2024-02-26 ASSESSMENT — FIBROSIS 4 INDEX: FIB4 SCORE: 1.09

## 2024-02-26 NOTE — ED NOTES
"Pt very upset about having a jiménez and a leg bag, RN explained to pt why he needs a jiménez, pt started raising his voice at RN. Pt then stated \"you haven't given me any water or any food\", RN stated she could ask the doctor if it is okay, pt then stated \"why the fuck did you even put an IV in if you aren't going to give me fluids\". RN explained to pt why we placed the IV and pt stated \"that is fucking bullshit\". ERP aware. ERP will talk to the pt.   "

## 2024-02-26 NOTE — ED TRIAGE NOTES
"Chief Complaint   Patient presents with    Difficulty Urinating     \"Hurts when I pee\"  also having incontinent issues.      Loose Stools     States he can't control his bowels x 4 days.  LBM last night states he is having incontinent issues.      Foot Pain     Left foot pain, wears an AFO and having arch pain.      Back Pain     Pain at T12     /83   Pulse 87   Temp 36.4 °C (97.6 °F) (Temporal)   Resp 18   Ht 1.829 m (6')   Wt 81.2 kg (179 lb 0.2 oz)   SpO2 91%   BMI 24.28 kg/m²     "

## 2024-02-26 NOTE — ED NOTES
"Patient is stable for discharge at this time, anticipatory guidance provided, close follow-up is encouraged, and ED return instructions have been detailed. Pt requesting pain meds. RN informed pt that the doctor did not prescribe any pain meds and to  the antibiotics pt stated \"ohh you want me to suffer, I hope you love your job\". Pt wheeled out via wheelchair by security.   "

## 2024-02-26 NOTE — DISCHARGE INSTRUCTIONS
Please call [facility] today, and make your appt for Tuesday.  Dr. Simeon was called to see you for a follow up.  Take all medications as prescribed.

## 2024-02-26 NOTE — ED NOTES
"Leg bag applied. Patient educated on proper use of leg bag. Patient denied education and stated, \"I shouldn't have to do this\" when referring to unscrewing and screwing the open closed valve.  "

## 2024-02-26 NOTE — ED PROVIDER NOTES
"ED Provider Note    CHIEF COMPLAINT  Chief Complaint   Patient presents with    Difficulty Urinating     \"Hurts when I pee\"  also having incontinent issues.      Loose Stools     States he can't control his bowels x 4 days.  LBM last night states he is having incontinent issues.      Foot Pain     Left foot pain, wears an AFO and having arch pain.      Back Pain     Pain at T12       EXTERNAL RECORDS REVIEWED  Outpatient Notes   [facility], infectious disease, [facility]    HPI/ROS  LIMITATION TO HISTORY   None  OUTSIDE HISTORIAN(S):  None    Christoph Taylor is a 58 y.o. male who presents here for evaluation of urinary retention.  Patient states that this has been new over the last couple of weeks, and that he has been told in the past that he has \"a large prostate.\"  He states that the pretension happens every few months, and this episode again, 2 weeks ago.  Patient complains of some low back pain that is chronic, for which he sees [facility] [place] for.  He states he does have a neurosurgeon who is aware of this.  Patient has had a year plus of bowel incontinence, that is not new for him, and again for which she sees neurosurgery for.  He has no chest pain, shortness breath, or abdominal pain.  Denies any fall or trauma.  Patient complains of some left calf and foot pain, and states that he has issues with an arch.    PAST MEDICAL HISTORY   has a past medical history of Abnormal electrocardiogram (ECG) (EKG) (11/8/2021), KRISTAL (acute kidney injury) ([facility]) (11/8/2021), Chest pain in adult (11/8/2021), CKD (chronic kidney disease) stage 3, GFR 30-59 ml/min ([facility]) (11/7/2021), Diabetes ([facility]), Diabetic ketoacidosis without coma associated with type 2 diabetes mellitus ([facility]) (11/7/2021), Diarrhea (3/18/2022), DKA, type 1, not at goal ([facility]) (7/28/2022), Esophagitis (7/28/2022), [place] (hard of hearing), and Hypercholesteremia.    SURGICAL HISTORY   has a past surgical history that includes other; upper gi " endoscopy,diagnosis (N/A, 3/14/2023); and finger or hand incision and drainage (Right, 6/12/2023).    FAMILY HISTORY  Family History   Problem Relation Age of Onset    Heart Disease Father        SOCIAL HISTORY  Social History     Tobacco Use    Smoking status: Former    Smokeless tobacco: Never   Vaping Use    Vaping Use: Not on file   Substance and Sexual Activity    Alcohol use: Not Currently    Drug use: Not Currently    Sexual activity: Not on file       CURRENT MEDICATIONS  Home Medications    **Home medications have not yet been reviewed for this encounter**         ALLERGIES  No Known Allergies    PHYSICAL EXAM  VITAL SIGNS: /83   Pulse 87   Temp 36.4 °C (97.6 °F) (Temporal)   Resp 18   Ht 1.829 m (6')   Wt 81.2 kg (179 lb 0.2 oz)   SpO2 91%   BMI 24.28 kg/m²    Constitutional: Well developed, well nourished. No acute distress.  HEENT: Normocephalic, atraumatic. Posterior pharynx clear and moist.  Eyes:  EOMI. Normal sclera.  Neck: Supple, Full range of motion, nontender.  Chest/Pulmonary: clear to ausculation. Symmetrical expansion.   Cardio: Regular rate and rhythm with no murmur.   Abdomen: Soft, nontender. No peritoneal signs. No guarding.   Musculoskeletal: No deformity, no edema, neurovascular intact. Left lower ext with calf tenderness, dpp present.   Neuro: Clear speech, appropriate, cooperative, cranial nerves II-XII grossly intact.  Psych: Normal mood and affect      DIAGNOSTIC STUDIES / PROCEDURES  Results for orders placed or performed during the hospital encounter of 02/25/24   CBC WITH DIFFERENTIAL   Result Value Ref Range    WBC 5.6 4.8 - 10.8 K/uL    RBC 4.77 4.70 - 6.10 M/uL    Hemoglobin 13.5 (L) 14.0 - 18.0 g/dL    Hematocrit 39.1 (L) 42.0 - 52.0 %    MCV 82.0 81.4 - 97.8 fL    MCH 28.3 27.0 - 33.0 pg    MCHC 34.5 32.3 - 36.5 g/dL    RDW 38.0 35.9 - 50.0 fL    Platelet Count 190 164 - 446 K/uL    MPV 9.5 9.0 - 12.9 fL    Neutrophils-Polys 64.40 44.00 - 72.00 %    Lymphocytes  26.10 22.00 - 41.00 %    Monocytes 6.80 0.00 - 13.40 %    Eosinophils 2.50 0.00 - 6.90 %    Basophils 0.00 0.00 - 1.80 %    Immature Granulocytes 0.20 0.00 - 0.90 %    Nucleated RBC 0.00 0.00 - 0.20 /100 WBC    Neutrophils (Absolute) 3.60 1.82 - 7.42 K/uL    Lymphs (Absolute) 1.46 1.00 - 4.80 K/uL    Monos (Absolute) 0.38 0.00 - 0.85 K/uL    Eos (Absolute) 0.14 0.00 - 0.51 K/uL    Baso (Absolute) 0.00 0.00 - 0.12 K/uL    Immature Granulocytes (abs) 0.01 0.00 - 0.11 K/uL    NRBC (Absolute) 0.00 K/uL   COMP METABOLIC PANEL   Result Value Ref Range    Sodium 132 (L) 135 - 145 mmol/L    Potassium 4.4 3.6 - 5.5 mmol/L    Chloride 93 (L) 96 - 112 mmol/L    Co2 29 20 - 33 mmol/L    Anion Gap 10.0 7.0 - 16.0    Glucose 560 (HH) 65 - 99 mg/dL    Bun 21 8 - 22 mg/dL    Creatinine 0.90 0.50 - 1.40 mg/dL    Calcium 8.8 8.4 - 10.2 mg/dL    Correct Calcium 9.0 8.5 - 10.5 mg/dL    AST(SGOT) 14 12 - 45 U/L    ALT(SGPT) 16 2 - 50 U/L    Alkaline Phosphatase 80 30 - 99 U/L    Total Bilirubin 0.3 0.1 - 1.5 mg/dL    Albumin 3.7 3.2 - 4.9 g/dL    Total Protein 6.2 6.0 - 8.2 g/dL    Globulin 2.5 1.9 - 3.5 g/dL    A-G Ratio 1.5 g/dL   URINALYSIS,CULTURE IF INDICATED    Specimen: Urine, Clean Catch   Result Value Ref Range    Color Yellow     Character Clear     Specific Gravity 1.010 <1.035    Ph 6.5 5.0 - 8.0    Glucose >=1000 (A) Negative mg/dL    Ketones Negative Negative mg/dL    Protein 30 (A) Negative mg/dL    Bilirubin Negative Negative    Nitrite Negative Negative    Leukocyte Esterase Negative Negative    Occult Blood Trace (A) Negative    Micro Urine Req Microscopic    URINE MICROSCOPIC (W/UA)   Result Value Ref Range    WBC 20-50 (A) /hpf    RBC 0-2 (A) /hpf    Bacteria Few (A) None /hpf    Epithelial Cells Negative Few /hpf   URINE CULTURE(NEW)    Specimen: Urine   Result Value Ref Range    Significant Indicator NEG     Source UR     Site -     Culture Result -    ESTIMATED GFR   Result Value Ref Range    GFR (CKD-EPI) 98 >60  mL/min/1.73 m 2         RADIOLOGY  I have independently interpreted the diagnostic imaging associated with this visit and am waiting the final reading from the radiologist.   My preliminary interpretation is as follows: see below   Radiologist interpretation:   US-EXTREMITY VENOUS LOWER UNILAT LEFT   Final Result      No evidence of deep venous thrombosis in the submitted images.               COURSE & MEDICAL DECISION MAKING  Pt will be discharged in stable condition     INITIAL ASSESSMENT, COURSE AND PLAN  Care Narrative: pt is non toxic appearing and afebrile.   The pt has a jiménez cath in place, a normal creat/bun, and  was given a leg bag.  The pt was given abx, and has refused to leave. He states he wants to see urology either here or at the main. I informed him that there is no need to see them at this time, because this is an outpatient work up.  Pt is stable for follow up, he drives, he is able to get abx, and make follow up appointment.  In addition, the pt has chronic low back pain, with chronic fecal incontinence. He sees [facility] [place] for the same.   1:10 AM  I have paged urology to see if they will see him as consult at Dr. Macdonald request    1:14 AM  I spoke to Dr. Clarke on for urology. He states there is no need to see the pt in the hospital. This is an outpatient work up, and he can do a post void trial on Tuesday.      1:20 AM   The pt is upset that he is being 'kicked to the curb.'  I informed him that he is able to call urology on Tuesday, they are expecting a call.  He will be given abx, and he can return for any other issues or concerns.     DISPOSITION AND DISCUSSIONS  I have discussed management of the patient with the following physicians and MICHELLE's:  none    Discussion of management with other QHP or appropriate source(s): none     Escalation of care considered, and ultimately not performed:iv fluids not indicated     Barriers to care at this time, including but not limited to: Patient does  not have established PCP.     Decision tools and prescription drugs considered including, but not limited to: none.    FINAL DIAGNOSIS  1. Urinary retention    2. Acute UTI    3. Muscle strain           Electronically signed by: Luis Herrmann D.O., 2/25/2024 10:58 PM

## 2024-02-27 NOTE — PROGRESS NOTES
"Subjective:     CC:   Chief Complaint   Patient presents with    Hospital Follow-up     HPI:   Christoph presents today with the following:    Hospital follow up  Was seen at the ER on 2/25/2024. Per chart: \"Christoph Taylor is a 58 y.o. male who presents here for evaluation of urinary retention.  Patient states that this has been new over the last couple of weeks, and that he has been told in the past that he has \"a large prostate.\"  He states that the pretension happens every few months, and this episode again, 2 weeks ago.  Patient complains of some low back pain that is chronic, for which he sees [facility] [place] for.  He states he does have a neurosurgeon who is aware of this.  Patient has had a year plus of bowel incontinence, that is not new for him, and again for which she sees neurosurgery for.  He has no chest pain, shortness breath, or abdominal pain.  Denies any fall or trauma.  Patient complains of some left calf and foot pain, and states that he has issues with an arch.\"  Patient was discharged from the hospital with a jiménez and leg bag. Was recommended he do a post void trial on Tuesday.    He has an appointment with urology on Wednesday. He will be picking up the antibiotics tonight for UTI.     Chronic left foot pain  Reports ongoing left foot pain that radiates unto his upper calf. He is currently wearing a velcro brace. Reports a history of navicular fracture, this was many years ago. Was previously seeing a podiatrist who recommended the brace which he has been wearing for years. Reports that pain continues to increase. He is currently seeing pain management for this low back pain. Taking oxycodone for low back pain which he reports also helps with his foot. He would like a refill of his pain medication today.     Uncontrolled type 2 diabetes mellitus with hyperglycemia (HCC)  Chronic condition.  Lab history:  Lab Results   Component Value Date/Time    HBA1C 11.5 (H) 01/20/2024 09:16 PM    HBA1C 9.5 " (H) 08/07/2023 07:39 PM    HBA1C 15.2 (H) 03/12/2023 01:46 AM    HBA1C 12.4 (A) 10/05/2022 02:12 PM    HBA1C 13.7 (H) 07/28/2022 06:55 AM    HBA1C 14.0 (H) 11/08/2021 03:24 PM     Currently taking insulin as directed. Reports that he is taking long acting insulin, but not regular dosage. Will adjust dosage of insulin based on blood sugar since he sometimes experiences hypoglycemia.   He is monitoring blood sugar regularly at home.  Reports diet is fair.   He is not exercising regularly.    ROS:   As documented in history of present illness above    Objective:     Exam: /68 (BP Location: Right arm, Patient Position: Sitting, BP Cuff Size: Adult)   Pulse 86   Temp 36.4 °C (97.6 °F)   Resp 14   Ht 1.829 m (6')   Wt 81.2 kg (179 lb)   SpO2 95%  Body mass index is 24.28 kg/m².    Constitutional: Alert, no distress, well-groomed.  Skin: Warm, dry, good turgor, no rashes in visible areas.  Eye: Equal, round and reactive, conjunctiva clear, lids normal.  ENMT: Lips without lesions, good dentition, moist mucous membranes.  Neck: Trachea midline, no masses, no thyromegaly.  Respiratory: Unlabored respiratory effort, no cough.  MSK: moves all extremities.  Neuro: Grossly non-focal.   Psych: Alert and oriented x3, normal affect and mood.    Assessment & Plan:     58 y.o. male with the following -     1. Hospital discharge follow-up  Reviewed the patient's hospitalization in depth including imaging, lab work and discharge summary. Answered all questions.    2. Urinary retention  Patient to keep appointment with urology on Wednesday. Discussed keeping catheter secure and clean, emptying regularly.     3. Acute UTI  Patient to take medication as prescribed. Keep appointment with urology. Return to clinic if symptoms not improving or in case of vomiting, fever, increasing pain.     4. Chronic foot pain, left  Chronic, uncontrolled condition. Cannot prescribe pain medication since he is followed by pain management.  Referral placed to orthopedics. Use over-the-counter pain reliever, such as acetaminophen (Tylenol), ibuprofen (Advil, Motrin) or naproxen (Aleve) as needed; follow package directions for dosing.  - Referral to Orthopedics    5. Uncontrolled type 2 diabetes mellitus with hyperglycemia (HCC)  Diabetes currently uncontrolled.  Discussed medication management, taking medications as prescribed. Reports that he currently has enough medication, does not need a refill. Discussed importance of diet control, carbohydrates less than 100 g/day.  Patient to limit fruit intake, portion control discussed.  Follow-up as directed.

## 2024-02-27 NOTE — ASSESSMENT & PLAN NOTE
Chronic condition.  Lab history:  Lab Results   Component Value Date/Time    HBA1C 11.5 (H) 01/20/2024 09:16 PM    HBA1C 9.5 (H) 08/07/2023 07:39 PM    HBA1C 15.2 (H) 03/12/2023 01:46 AM    HBA1C 12.4 (A) 10/05/2022 02:12 PM    HBA1C 13.7 (H) 07/28/2022 06:55 AM    HBA1C 14.0 (H) 11/08/2021 03:24 PM     Currently taking insulin as directed. Reports that he is taking long acting insulin, but not regular dosage. Will adjust dosage of insulin based on blood sugar since he sometimes experiences hypoglycemia.   He is monitoring blood sugar regularly at home.  Reports diet is fair.   He is not exercising regularly.

## 2024-02-28 LAB
BACTERIA UR CULT: ABNORMAL
BACTERIA UR CULT: ABNORMAL
SIGNIFICANT IND 70042: ABNORMAL
SITE SITE: ABNORMAL
SOURCE SOURCE: ABNORMAL

## 2024-03-01 NOTE — ED NOTES
"ED Positive Culture Follow-up/Notification Note:    Date: 3/1/24     Patient seen in the ED on 2/25/2024 for evaluation of urinary retention. Per ED provider note, \"Patient states that this has been new over the last couple of weeks, and that he has been told in the past that he has \"a large prostate.\" He states that the pretension happens every few months, and this episode again, 2 weeks ago. Patient complains of some low back pain that is chronic, for which he sees [facility] for. He states he does have a neurosurgeon who is aware of this. Patient has had a year plus of bowel incontinence, that is not new for him, and again for which she sees neurosurgery for. He has no chest pain, shortness breath, or abdominal pain. Denies any fall or trauma. Patient complains of some left calf and foot pain, and states that he has issues with an arch.\"  1. Urinary retention    2. Acute UTI    3. Muscle strain       Discharge Medication List as of 2/26/2024  1:29 AM        START taking these medications    Details   cephALEXin (KEFLEX) 500 MG Cap Take 1 Capsule by mouth 4 times a day for 7 days., Disp-28 Capsule, R-0, Normal             Allergies: Patient has no known allergies.     Vitals:    02/25/24 1758 02/25/24 1806   BP:  137/83   Pulse:  87   Resp:  18   Temp:  36.4 °C (97.6 °F)   TempSrc:  Temporal   SpO2:  91%   Weight: 81.2 kg (179 lb 0.2 oz)    Height: 1.829 m (6')        Final cultures:   Results       Procedure Component Value Units Date/Time    URINE CULTURE(NEW) [[id number]]  (Abnormal)  (Susceptibility) Collected: 02/25/24 2145    Order Status: Completed Specimen: Urine Updated: 02/28/24 0962     Significant Indicator POS     Source UR     Site -     Culture Result Growth noted after further incubation, see below for  organism identification.        Enterococcus faecalis  >100,000 cfu/mL      Narrative:      Indication for culture:->Patient WITHOUT an indwelling Crowley  catheter in place with new onset of " Dysuria, Frequency,  Urgency, and/or Suprapubic pain    Susceptibility       Enterococcus faecalis (1)       Antibiotic Interpretation Microscan   Method Status    Ciprofloxacin  [*]  Resistant >2 mcg/mL JORDON Final    Daptomycin Sensitive 1 mcg/mL JORDON Final    Nitrofurantoin Sensitive <=32 mcg/mL JORDON Final    Gent Synergy  [*]  Resistant >500 mcg/mL JORDON Final    Levofloxacin  [*]  Resistant >4 mcg/mL JORDON Final    Ampicillin Sensitive <=2 mcg/mL JORDON Final    Linezolid  [*]  Sensitive 2 mcg/mL JORDON Final    Vancomycin Sensitive 1 mcg/mL JORODN Final    Rifampin  [*]  Sensitive <=1 mcg/mL JORDON Final    Strep Synergy  [*]  Sensitive <=1000 mcg/mL JORDON Final    Tetracycline Resistant >8 mcg/mL JORDON Final    Tigecycline  [*]  Sensitive <=0.25 mcg/mL JORDON Final               [*]  Suppressed Antibiotic                   URINALYSIS,CULTURE IF INDICATED [[id number]]  (Abnormal) Collected: 02/25/24 2140    Order Status: Completed Specimen: Urine, Clean Catch Updated: 02/25/24 2202     Color Yellow     Character Clear     Specific Gravity 1.010     Ph 6.5     Glucose >=1000 mg/dL      Ketones Negative mg/dL      Protein 30 mg/dL      Bilirubin Negative     Nitrite Negative     Leukocyte Esterase Negative     Occult Blood Trace     Micro Urine Req Microscopic    Narrative:      Indication for culture:->Patient WITHOUT an indwelling Crowley  catheter in place with new onset of Dysuria, Frequency,  Urgency, and/or Suprapubic pain            Plan:   Urine culture positive for Enterococcus faecalis that is inherently resistant to cephalexin. However, the bacteria grew after extended incubation and may not be representative of the true pathogen. Will call patient to evaluate symptoms and change antibiotics if patient is not improving.  Left voicemail for patient to return call. Will send letter for patient to return call if not feeling better.  Isiah Orellana, PharmD

## 2024-05-06 ENCOUNTER — APPOINTMENT (OUTPATIENT)
Dept: RADIOLOGY | Facility: MEDICAL CENTER | Age: 59
End: 2024-05-06
Attending: EMERGENCY MEDICINE
Payer: COMMERCIAL

## 2024-05-06 ENCOUNTER — HOSPITAL ENCOUNTER (OUTPATIENT)
Facility: MEDICAL CENTER | Age: 59
End: 2024-05-13
Attending: EMERGENCY MEDICINE | Admitting: HOSPITALIST
Payer: COMMERCIAL

## 2024-05-06 ENCOUNTER — HOSPITAL ENCOUNTER (EMERGENCY)
Facility: MEDICAL CENTER | Age: 59
End: 2024-05-06
Attending: STUDENT IN AN ORGANIZED HEALTH CARE EDUCATION/TRAINING PROGRAM
Payer: COMMERCIAL

## 2024-05-06 VITALS
OXYGEN SATURATION: 100 % | HEART RATE: 98 BPM | HEIGHT: 72 IN | SYSTOLIC BLOOD PRESSURE: 135 MMHG | BODY MASS INDEX: 24.38 KG/M2 | RESPIRATION RATE: 18 BRPM | DIASTOLIC BLOOD PRESSURE: 86 MMHG | WEIGHT: 180 LBS | TEMPERATURE: 98.4 F

## 2024-05-06 DIAGNOSIS — E11.00 TYPE 2 DIABETES MELLITUS WITH HYPEROSMOLAR NONKETOTIC HYPERGLYCEMIA (HCC): ICD-10-CM

## 2024-05-06 DIAGNOSIS — R10.13 EPIGASTRIC ABDOMINAL PAIN: ICD-10-CM

## 2024-05-06 DIAGNOSIS — R10.12 LEFT UPPER QUADRANT ABDOMINAL PAIN: ICD-10-CM

## 2024-05-06 DIAGNOSIS — E11.9 DIABETES MELLITUS TYPE 2, INSULIN DEPENDENT (HCC): ICD-10-CM

## 2024-05-06 DIAGNOSIS — R26.89 FUNCTIONAL GAIT ABNORMALITY: ICD-10-CM

## 2024-05-06 DIAGNOSIS — R73.9 HYPERGLYCEMIA: ICD-10-CM

## 2024-05-06 DIAGNOSIS — Z79.4 DIABETES MELLITUS TYPE 2, INSULIN DEPENDENT (HCC): ICD-10-CM

## 2024-05-06 DIAGNOSIS — R11.2 NAUSEA AND VOMITING, UNSPECIFIED VOMITING TYPE: ICD-10-CM

## 2024-05-06 DIAGNOSIS — E86.0 DEHYDRATION: ICD-10-CM

## 2024-05-06 PROBLEM — Z78.9 FULL CODE STATUS: Status: ACTIVE | Noted: 2024-05-06

## 2024-05-06 PROBLEM — D64.9 NORMOCHROMIC NORMOCYTIC ANEMIA: Status: ACTIVE | Noted: 2024-05-06

## 2024-05-06 PROBLEM — R78.89 ELEVATED BETA-HYDROXYBUTYRATE: Status: ACTIVE | Noted: 2024-05-06

## 2024-05-06 LAB
ALBUMIN SERPL BCP-MCNC: 3.6 G/DL (ref 3.2–4.9)
ALBUMIN SERPL BCP-MCNC: 3.6 G/DL (ref 3.2–4.9)
ALBUMIN/GLOB SERPL: 1 G/DL
ALBUMIN/GLOB SERPL: 1 G/DL
ALP SERPL-CCNC: 107 U/L (ref 30–99)
ALP SERPL-CCNC: 107 U/L (ref 30–99)
ALT SERPL-CCNC: 11 U/L (ref 2–50)
ALT SERPL-CCNC: 12 U/L (ref 2–50)
ANION GAP SERPL CALC-SCNC: 16 MMOL/L (ref 7–16)
ANION GAP SERPL CALC-SCNC: 23 MMOL/L (ref 7–16)
ANION GAP SERPL CALC-SCNC: 26 MMOL/L (ref 7–16)
APPEARANCE UR: CLEAR
AST SERPL-CCNC: 10 U/L (ref 12–45)
AST SERPL-CCNC: 9 U/L (ref 12–45)
B-OH-BUTYR SERPL-MCNC: 7.5 MMOL/L (ref 0.02–0.27)
B-OH-BUTYR SERPL-MCNC: >8 MMOL/L (ref 0.02–0.27)
BACTERIA #/AREA URNS HPF: ABNORMAL /HPF
BASE EXCESS BLDV CALC-SCNC: -2 MMOL/L
BASE EXCESS BLDV CALC-SCNC: 0 MMOL/L
BASOPHILS # BLD AUTO: 0.1 % (ref 0–1.8)
BASOPHILS # BLD AUTO: 0.1 % (ref 0–1.8)
BASOPHILS # BLD: 0.01 K/UL (ref 0–0.12)
BASOPHILS # BLD: 0.01 K/UL (ref 0–0.12)
BILIRUB SERPL-MCNC: 0.4 MG/DL (ref 0.1–1.5)
BILIRUB SERPL-MCNC: 0.5 MG/DL (ref 0.1–1.5)
BILIRUB UR QL STRIP.AUTO: ABNORMAL
BODY TEMPERATURE: 36.3 CENTIGRADE
BODY TEMPERATURE: 36.4 CENTIGRADE
BUN SERPL-MCNC: 33 MG/DL (ref 8–22)
BUN SERPL-MCNC: 41 MG/DL (ref 8–22)
BUN SERPL-MCNC: 44 MG/DL (ref 8–22)
CALCIUM ALBUM COR SERPL-MCNC: 9.1 MG/DL (ref 8.5–10.5)
CALCIUM ALBUM COR SERPL-MCNC: 9.2 MG/DL (ref 8.5–10.5)
CALCIUM SERPL-MCNC: 8.1 MG/DL (ref 8.4–10.2)
CALCIUM SERPL-MCNC: 8.8 MG/DL (ref 8.4–10.2)
CALCIUM SERPL-MCNC: 8.9 MG/DL (ref 8.4–10.2)
CHLORIDE SERPL-SCNC: 87 MMOL/L (ref 96–112)
CHLORIDE SERPL-SCNC: 87 MMOL/L (ref 96–112)
CHLORIDE SERPL-SCNC: 92 MMOL/L (ref 96–112)
CO2 SERPL-SCNC: 24 MMOL/L (ref 20–33)
CO2 SERPL-SCNC: 26 MMOL/L (ref 20–33)
CO2 SERPL-SCNC: 27 MMOL/L (ref 20–33)
COLOR UR: YELLOW
CREAT SERPL-MCNC: 0.81 MG/DL (ref 0.5–1.4)
CREAT SERPL-MCNC: 0.97 MG/DL (ref 0.5–1.4)
CREAT SERPL-MCNC: 1.06 MG/DL (ref 0.5–1.4)
EKG IMPRESSION: NORMAL
EOSINOPHIL # BLD AUTO: 0 K/UL (ref 0–0.51)
EOSINOPHIL # BLD AUTO: 0 K/UL (ref 0–0.51)
EOSINOPHIL NFR BLD: 0 % (ref 0–6.9)
EOSINOPHIL NFR BLD: 0 % (ref 0–6.9)
EPI CELLS #/AREA URNS HPF: ABNORMAL /HPF
ERYTHROCYTE [DISTWIDTH] IN BLOOD BY AUTOMATED COUNT: 40.3 FL (ref 35.9–50)
ERYTHROCYTE [DISTWIDTH] IN BLOOD BY AUTOMATED COUNT: 40.8 FL (ref 35.9–50)
GFR SERPLBLD CREATININE-BSD FMLA CKD-EPI: 101 ML/MIN/1.73 M 2
GFR SERPLBLD CREATININE-BSD FMLA CKD-EPI: 80 ML/MIN/1.73 M 2
GFR SERPLBLD CREATININE-BSD FMLA CKD-EPI: 89 ML/MIN/1.73 M 2
GLOBULIN SER CALC-MCNC: 3.5 G/DL (ref 1.9–3.5)
GLOBULIN SER CALC-MCNC: 3.5 G/DL (ref 1.9–3.5)
GLUCOSE BLD STRIP.AUTO-MCNC: 365 MG/DL (ref 65–99)
GLUCOSE BLD STRIP.AUTO-MCNC: 432 MG/DL (ref 65–99)
GLUCOSE BLD STRIP.AUTO-MCNC: 432 MG/DL (ref 65–99)
GLUCOSE BLD STRIP.AUTO-MCNC: 437 MG/DL (ref 65–99)
GLUCOSE SERPL-MCNC: 478 MG/DL (ref 65–99)
GLUCOSE SERPL-MCNC: 506 MG/DL (ref 65–99)
GLUCOSE SERPL-MCNC: 556 MG/DL (ref 65–99)
GLUCOSE UR STRIP.AUTO-MCNC: >=1000 MG/DL
HCO3 BLDV-SCNC: 24 MMOL/L (ref 24–28)
HCO3 BLDV-SCNC: 26 MMOL/L (ref 24–28)
HCT VFR BLD AUTO: 41.2 % (ref 42–52)
HCT VFR BLD AUTO: 41.6 % (ref 42–52)
HGB BLD-MCNC: 13.9 G/DL (ref 14–18)
HGB BLD-MCNC: 13.9 G/DL (ref 14–18)
IMM GRANULOCYTES # BLD AUTO: 0.05 K/UL (ref 0–0.11)
IMM GRANULOCYTES # BLD AUTO: 0.09 K/UL (ref 0–0.11)
IMM GRANULOCYTES NFR BLD AUTO: 0.4 % (ref 0–0.9)
IMM GRANULOCYTES NFR BLD AUTO: 0.6 % (ref 0–0.9)
INHALED O2 FLOW RATE: 99 L/MIN
INHALED O2 FLOW RATE: NORMAL L/MIN
KETONES UR STRIP.AUTO-MCNC: >=80 MG/DL
LEUKOCYTE ESTERASE UR QL STRIP.AUTO: NEGATIVE
LIPASE SERPL-CCNC: 11 U/L (ref 11–82)
LIPASE SERPL-CCNC: 5 U/L (ref 11–82)
LYMPHOCYTES # BLD AUTO: 0.82 K/UL (ref 1–4.8)
LYMPHOCYTES # BLD AUTO: 0.84 K/UL (ref 1–4.8)
LYMPHOCYTES NFR BLD: 5.4 % (ref 22–41)
LYMPHOCYTES NFR BLD: 7.4 % (ref 22–41)
MAGNESIUM SERPL-MCNC: 2.2 MG/DL (ref 1.5–2.5)
MAGNESIUM SERPL-MCNC: 2.8 MG/DL (ref 1.5–2.5)
MCH RBC QN AUTO: 27.9 PG (ref 27–33)
MCH RBC QN AUTO: 28.3 PG (ref 27–33)
MCHC RBC AUTO-ENTMCNC: 33.4 G/DL (ref 32.3–36.5)
MCHC RBC AUTO-ENTMCNC: 33.7 G/DL (ref 32.3–36.5)
MCV RBC AUTO: 83.5 FL (ref 81.4–97.8)
MCV RBC AUTO: 83.7 FL (ref 81.4–97.8)
MICRO URNS: ABNORMAL
MONOCYTES # BLD AUTO: 0.19 K/UL (ref 0–0.85)
MONOCYTES # BLD AUTO: 0.63 K/UL (ref 0–0.85)
MONOCYTES NFR BLD AUTO: 1.7 % (ref 0–13.4)
MONOCYTES NFR BLD AUTO: 4.1 % (ref 0–13.4)
NEUTROPHILS # BLD AUTO: 10.29 K/UL (ref 1.82–7.42)
NEUTROPHILS # BLD AUTO: 13.76 K/UL (ref 1.82–7.42)
NEUTROPHILS NFR BLD: 89.8 % (ref 44–72)
NEUTROPHILS NFR BLD: 90.4 % (ref 44–72)
NITRITE UR QL STRIP.AUTO: NEGATIVE
NRBC # BLD AUTO: 0 K/UL
NRBC # BLD AUTO: 0 K/UL
NRBC BLD-RTO: 0 /100 WBC (ref 0–0.2)
NRBC BLD-RTO: 0 /100 WBC (ref 0–0.2)
PCO2 BLDV: 42.1 MMHG (ref 41–51)
PCO2 BLDV: 46.4 MMHG (ref 41–51)
PH BLDV: 7.37 [PH] (ref 7.31–7.45)
PH BLDV: 7.37 [PH] (ref 7.31–7.45)
PH UR STRIP.AUTO: 5.5 [PH] (ref 5–8)
PHOSPHATE SERPL-MCNC: 4.5 MG/DL (ref 2.5–4.5)
PLATELET # BLD AUTO: 194 K/UL (ref 164–446)
PLATELET # BLD AUTO: 226 K/UL (ref 164–446)
PMV BLD AUTO: 9.5 FL (ref 9–12.9)
PMV BLD AUTO: 9.6 FL (ref 9–12.9)
PO2 BLDV: 36.8 MMHG (ref 25–40)
PO2 BLDV: 51.7 MMHG (ref 25–40)
POTASSIUM SERPL-SCNC: 4.6 MMOL/L (ref 3.6–5.5)
POTASSIUM SERPL-SCNC: 4.7 MMOL/L (ref 3.6–5.5)
POTASSIUM SERPL-SCNC: 4.9 MMOL/L (ref 3.6–5.5)
PROT SERPL-MCNC: 7.1 G/DL (ref 6–8.2)
PROT SERPL-MCNC: 7.1 G/DL (ref 6–8.2)
PROT UR QL STRIP: NEGATIVE MG/DL
RBC # BLD AUTO: 4.92 M/UL (ref 4.7–6.1)
RBC # BLD AUTO: 4.98 M/UL (ref 4.7–6.1)
RBC # URNS HPF: ABNORMAL /HPF
RBC UR QL AUTO: ABNORMAL
SAO2 % BLDV: 67.8 %
SAO2 % BLDV: 84 %
SODIUM SERPL-SCNC: 135 MMOL/L (ref 135–145)
SODIUM SERPL-SCNC: 136 MMOL/L (ref 135–145)
SODIUM SERPL-SCNC: 137 MMOL/L (ref 135–145)
SP GR UR STRIP.AUTO: 1.01
TROPONIN T SERPL-MCNC: 38 NG/L (ref 6–19)
WBC # BLD AUTO: 11.4 K/UL (ref 4.8–10.8)
WBC # BLD AUTO: 15.3 K/UL (ref 4.8–10.8)
WBC #/AREA URNS HPF: ABNORMAL /HPF

## 2024-05-06 PROCEDURE — 99223 1ST HOSP IP/OBS HIGH 75: CPT | Performed by: HOSPITALIST

## 2024-05-06 RX ORDER — SODIUM CHLORIDE, SODIUM LACTATE, POTASSIUM CHLORIDE, CALCIUM CHLORIDE 600; 310; 30; 20 MG/100ML; MG/100ML; MG/100ML; MG/100ML
1000 INJECTION, SOLUTION INTRAVENOUS ONCE
Status: COMPLETED | OUTPATIENT
Start: 2024-05-06 | End: 2024-05-06

## 2024-05-06 RX ORDER — SODIUM CHLORIDE, SODIUM LACTATE, POTASSIUM CHLORIDE, CALCIUM CHLORIDE 600; 310; 30; 20 MG/100ML; MG/100ML; MG/100ML; MG/100ML
2000 INJECTION, SOLUTION INTRAVENOUS ONCE
Status: COMPLETED | OUTPATIENT
Start: 2024-05-06 | End: 2024-05-06

## 2024-05-06 RX ORDER — PROMETHAZINE HYDROCHLORIDE 25 MG/1
12.5-25 SUPPOSITORY RECTAL EVERY 4 HOURS PRN
Status: DISCONTINUED | OUTPATIENT
Start: 2024-05-06 | End: 2024-05-09

## 2024-05-06 RX ORDER — PROCHLORPERAZINE EDISYLATE 5 MG/ML
5-10 INJECTION INTRAMUSCULAR; INTRAVENOUS EVERY 4 HOURS PRN
Status: DISCONTINUED | OUTPATIENT
Start: 2024-05-06 | End: 2024-05-09

## 2024-05-06 RX ORDER — ACETAMINOPHEN 325 MG/1
650 TABLET ORAL EVERY 6 HOURS PRN
Status: DISCONTINUED | OUTPATIENT
Start: 2024-05-06 | End: 2024-05-08

## 2024-05-06 RX ORDER — ONDANSETRON 4 MG/1
4 TABLET, ORALLY DISINTEGRATING ORAL EVERY 4 HOURS PRN
Status: DISCONTINUED | OUTPATIENT
Start: 2024-05-06 | End: 2024-05-13 | Stop reason: HOSPADM

## 2024-05-06 RX ORDER — OXYCODONE HYDROCHLORIDE 5 MG/1
5 TABLET ORAL
Status: DISCONTINUED | OUTPATIENT
Start: 2024-05-06 | End: 2024-05-07

## 2024-05-06 RX ORDER — AMOXICILLIN 250 MG
2 CAPSULE ORAL EVERY EVENING
Status: DISCONTINUED | OUTPATIENT
Start: 2024-05-06 | End: 2024-05-13 | Stop reason: HOSPADM

## 2024-05-06 RX ORDER — METOCLOPRAMIDE HYDROCHLORIDE 5 MG/ML
10 INJECTION INTRAMUSCULAR; INTRAVENOUS ONCE
Status: COMPLETED | OUTPATIENT
Start: 2024-05-06 | End: 2024-05-06

## 2024-05-06 RX ORDER — METOCLOPRAMIDE 10 MG/1
10 TABLET ORAL 3 TIMES DAILY PRN
Status: DISCONTINUED | OUTPATIENT
Start: 2024-05-06 | End: 2024-05-09

## 2024-05-06 RX ORDER — HYDROMORPHONE HYDROCHLORIDE 1 MG/ML
0.25 INJECTION, SOLUTION INTRAMUSCULAR; INTRAVENOUS; SUBCUTANEOUS
Status: DISCONTINUED | OUTPATIENT
Start: 2024-05-06 | End: 2024-05-07

## 2024-05-06 RX ORDER — SODIUM CHLORIDE 9 MG/ML
INJECTION, SOLUTION INTRAVENOUS CONTINUOUS
Status: DISCONTINUED | OUTPATIENT
Start: 2024-05-06 | End: 2024-05-07

## 2024-05-06 RX ORDER — SODIUM CHLORIDE, SODIUM LACTATE, POTASSIUM CHLORIDE, AND CALCIUM CHLORIDE .6; .31; .03; .02 G/100ML; G/100ML; G/100ML; G/100ML
2000 INJECTION, SOLUTION INTRAVENOUS ONCE
Status: COMPLETED | OUTPATIENT
Start: 2024-05-06 | End: 2024-05-06

## 2024-05-06 RX ORDER — OXYCODONE HYDROCHLORIDE 5 MG/1
2.5 TABLET ORAL
Status: DISCONTINUED | OUTPATIENT
Start: 2024-05-06 | End: 2024-05-07

## 2024-05-06 RX ORDER — KETOROLAC TROMETHAMINE 15 MG/ML
15 INJECTION, SOLUTION INTRAMUSCULAR; INTRAVENOUS ONCE
Status: COMPLETED | OUTPATIENT
Start: 2024-05-06 | End: 2024-05-06

## 2024-05-06 RX ORDER — FAMOTIDINE 20 MG/1
20 TABLET, FILM COATED ORAL 2 TIMES DAILY
Qty: 30 TABLET | Refills: 0 | Status: ON HOLD | OUTPATIENT
Start: 2024-05-06 | End: 2024-05-23

## 2024-05-06 RX ORDER — HEPARIN SODIUM 5000 [USP'U]/ML
5000 INJECTION, SOLUTION INTRAVENOUS; SUBCUTANEOUS EVERY 8 HOURS
Status: DISCONTINUED | OUTPATIENT
Start: 2024-05-06 | End: 2024-05-10

## 2024-05-06 RX ORDER — INSULIN GLARGINE 100 [IU]/ML
10-25 INJECTION, SOLUTION SUBCUTANEOUS 2 TIMES DAILY
COMMUNITY
End: 2024-05-20

## 2024-05-06 RX ORDER — POLYETHYLENE GLYCOL 3350 17 G/17G
1 POWDER, FOR SOLUTION ORAL
Status: DISCONTINUED | OUTPATIENT
Start: 2024-05-06 | End: 2024-05-13 | Stop reason: HOSPADM

## 2024-05-06 RX ORDER — SUCRALFATE 1 G/1
1 TABLET ORAL
Qty: 21 TABLET | Refills: 0 | Status: SHIPPED | OUTPATIENT
Start: 2024-05-06 | End: 2024-05-13

## 2024-05-06 RX ORDER — METOCLOPRAMIDE 10 MG/1
10 TABLET ORAL 3 TIMES DAILY PRN
Qty: 30 TABLET | Refills: 0 | Status: SHIPPED | OUTPATIENT
Start: 2024-05-06 | End: 2024-05-20

## 2024-05-06 RX ORDER — ONDANSETRON 2 MG/ML
4 INJECTION INTRAMUSCULAR; INTRAVENOUS ONCE
Status: COMPLETED | OUTPATIENT
Start: 2024-05-06 | End: 2024-05-06

## 2024-05-06 RX ORDER — ASPIRIN 81 MG/1
81 TABLET ORAL EVERY EVENING
Status: ON HOLD | COMMUNITY

## 2024-05-06 RX ORDER — OXYCODONE HYDROCHLORIDE 5 MG/1
5 TABLET ORAL 2 TIMES DAILY
Status: ON HOLD | COMMUNITY
End: 2024-05-13

## 2024-05-06 RX ORDER — ASPIRIN 81 MG/1
81 TABLET ORAL EVERY EVENING
Status: DISCONTINUED | OUTPATIENT
Start: 2024-05-06 | End: 2024-05-13 | Stop reason: HOSPADM

## 2024-05-06 RX ORDER — FAMOTIDINE 20 MG/1
20 TABLET, FILM COATED ORAL 2 TIMES DAILY
Status: DISCONTINUED | OUTPATIENT
Start: 2024-05-06 | End: 2024-05-08

## 2024-05-06 RX ORDER — PROMETHAZINE HYDROCHLORIDE 25 MG/1
12.5-25 TABLET ORAL EVERY 4 HOURS PRN
Status: DISCONTINUED | OUTPATIENT
Start: 2024-05-06 | End: 2024-05-09

## 2024-05-06 RX ORDER — LABETALOL HYDROCHLORIDE 5 MG/ML
10 INJECTION, SOLUTION INTRAVENOUS EVERY 4 HOURS PRN
Status: DISCONTINUED | OUTPATIENT
Start: 2024-05-06 | End: 2024-05-13 | Stop reason: HOSPADM

## 2024-05-06 RX ORDER — ONDANSETRON 2 MG/ML
4 INJECTION INTRAMUSCULAR; INTRAVENOUS EVERY 4 HOURS PRN
Status: DISCONTINUED | OUTPATIENT
Start: 2024-05-06 | End: 2024-05-13 | Stop reason: HOSPADM

## 2024-05-06 RX ORDER — SUCRALFATE 1 G/1
1 TABLET ORAL
Status: DISCONTINUED | OUTPATIENT
Start: 2024-05-07 | End: 2024-05-13 | Stop reason: HOSPADM

## 2024-05-06 RX ADMIN — ONDANSETRON 4 MG: 2 INJECTION INTRAMUSCULAR; INTRAVENOUS at 14:40

## 2024-05-06 RX ADMIN — KETOROLAC TROMETHAMINE 15 MG: 15 INJECTION, SOLUTION INTRAMUSCULAR; INTRAVENOUS at 05:00

## 2024-05-06 RX ADMIN — METOCLOPRAMIDE 10 MG: 5 INJECTION, SOLUTION INTRAMUSCULAR; INTRAVENOUS at 02:52

## 2024-05-06 RX ADMIN — OXYCODONE HYDROCHLORIDE 5 MG: 5 TABLET ORAL at 19:53

## 2024-05-06 RX ADMIN — ASPIRIN 81 MG: 81 TABLET, COATED ORAL at 17:38

## 2024-05-06 RX ADMIN — KETOROLAC TROMETHAMINE 15 MG: 15 INJECTION, SOLUTION INTRAMUSCULAR; INTRAVENOUS at 15:09

## 2024-05-06 RX ADMIN — INSULIN HUMAN 8 UNITS: 100 INJECTION, SOLUTION PARENTERAL at 16:31

## 2024-05-06 RX ADMIN — MORPHINE SULFATE 6 MG: 10 INJECTION INTRAVENOUS at 14:43

## 2024-05-06 RX ADMIN — SODIUM CHLORIDE, POTASSIUM CHLORIDE, SODIUM LACTATE AND CALCIUM CHLORIDE 2000 ML: 600; 310; 30; 20 INJECTION, SOLUTION INTRAVENOUS at 03:15

## 2024-05-06 RX ADMIN — SODIUM CHLORIDE, POTASSIUM CHLORIDE, SODIUM LACTATE AND CALCIUM CHLORIDE 1000 ML: 600; 310; 30; 20 INJECTION, SOLUTION INTRAVENOUS at 15:45

## 2024-05-06 RX ADMIN — HEPARIN SODIUM 5000 UNITS: 5000 INJECTION, SOLUTION INTRAVENOUS; SUBCUTANEOUS at 17:38

## 2024-05-06 RX ADMIN — SODIUM CHLORIDE, POTASSIUM CHLORIDE, SODIUM LACTATE AND CALCIUM CHLORIDE 1000 ML: 600; 310; 30; 20 INJECTION, SOLUTION INTRAVENOUS at 14:44

## 2024-05-06 RX ADMIN — FENTANYL CITRATE 100 MCG: 50 INJECTION, SOLUTION INTRAMUSCULAR; INTRAVENOUS at 02:52

## 2024-05-06 RX ADMIN — SODIUM CHLORIDE: 9 INJECTION, SOLUTION INTRAVENOUS at 17:42

## 2024-05-06 RX ADMIN — LIDOCAINE HYDROCHLORIDE 30 ML: 20 SOLUTION ORAL; TOPICAL at 16:26

## 2024-05-06 RX ADMIN — INSULIN HUMAN 10 UNITS: 100 INJECTION, SOLUTION PARENTERAL at 03:28

## 2024-05-06 RX ADMIN — HEPARIN SODIUM 5000 UNITS: 5000 INJECTION, SOLUTION INTRAVENOUS; SUBCUTANEOUS at 21:18

## 2024-05-06 RX ADMIN — INSULIN GLARGINE-YFGN 10 UNITS: 100 INJECTION, SOLUTION SUBCUTANEOUS at 17:48

## 2024-05-06 RX ADMIN — FAMOTIDINE 20 MG: 20 TABLET ORAL at 17:38

## 2024-05-06 RX ADMIN — IOHEXOL 100 ML: 350 INJECTION, SOLUTION INTRAVENOUS at 15:43

## 2024-05-06 RX ADMIN — SODIUM CHLORIDE, POTASSIUM CHLORIDE, SODIUM LACTATE AND CALCIUM CHLORIDE 2000 ML: 600; 310; 30; 20 INJECTION, SOLUTION INTRAVENOUS at 18:02

## 2024-05-06 ASSESSMENT — PAIN DESCRIPTION - PAIN TYPE
TYPE: ACUTE PAIN

## 2024-05-06 ASSESSMENT — ENCOUNTER SYMPTOMS
SEIZURES: 0
INSOMNIA: 0
FALLS: 0
EYE REDNESS: 0
TINGLING: 0
SENSORY CHANGE: 0
PND: 0
FEVER: 0
CARDIOVASCULAR NEGATIVE: 1
VOMITING: 1
STRIDOR: 0
BLOOD IN STOOL: 0
EYES NEGATIVE: 1
EYE DISCHARGE: 0
COUGH: 0
RESPIRATORY NEGATIVE: 1
PHOTOPHOBIA: 0
DIAPHORESIS: 0
PSYCHIATRIC NEGATIVE: 1
POLYDIPSIA: 0
DIZZINESS: 0
MYALGIAS: 0
BRUISES/BLEEDS EASILY: 0
FLANK PAIN: 0
FOCAL WEAKNESS: 0
HEARTBURN: 0
CHILLS: 0
ORTHOPNEA: 0
WHEEZING: 0
SPUTUM PRODUCTION: 0
SHORTNESS OF BREATH: 0
BACK PAIN: 0
DEPRESSION: 0
HEADACHES: 1
SINUS PAIN: 0
DOUBLE VISION: 0
NAUSEA: 1
ABDOMINAL PAIN: 1

## 2024-05-06 ASSESSMENT — COGNITIVE AND FUNCTIONAL STATUS - GENERAL
STANDING UP FROM CHAIR USING ARMS: A LITTLE
CLIMB 3 TO 5 STEPS WITH RAILING: A LOT
MOBILITY SCORE: 17
MOVING TO AND FROM BED TO CHAIR: A LITTLE
SUGGESTED CMS G CODE MODIFIER DAILY ACTIVITY: CJ
HELP NEEDED FOR BATHING: A LITTLE
WALKING IN HOSPITAL ROOM: A LITTLE
DAILY ACTIVITIY SCORE: 22
SUGGESTED CMS G CODE MODIFIER MOBILITY: CK
TURNING FROM BACK TO SIDE WHILE IN FLAT BAD: A LITTLE
MOVING FROM LYING ON BACK TO SITTING ON SIDE OF FLAT BED: A LITTLE
TOILETING: A LITTLE

## 2024-05-06 ASSESSMENT — COPD QUESTIONNAIRES
HAVE YOU SMOKED AT LEAST 100 CIGARETTES IN YOUR ENTIRE LIFE: YES
COPD SCREENING SCORE: 3
DURING THE PAST 4 WEEKS HOW MUCH DID YOU FEEL SHORT OF BREATH: NONE/LITTLE OF THE TIME
DO YOU EVER COUGH UP ANY MUCUS OR PHLEGM?: NO/ONLY WITH OCCASIONAL COLDS OR INFECTIONS

## 2024-05-06 ASSESSMENT — LIFESTYLE VARIABLES: SUBSTANCE_ABUSE: 0

## 2024-05-06 ASSESSMENT — FIBROSIS 4 INDEX
FIB4 SCORE: 1.09
FIB4 SCORE: 0.75
FIB4 SCORE: 0.83

## 2024-05-06 NOTE — ED NOTES
Adalberto from Lab called with critical result of Glucose of 506 at 0316. Critical lab result read back to Adalberto.   Dr. Rhodes notified of critical lab result at 0316.  Critical lab result read back by Dr. Rhodes.

## 2024-05-06 NOTE — ED NOTES
Patient medicated for his pain at 9/10.  Patient asking more pain medication.  Educated patient that we will reassess his pain after this medication.

## 2024-05-06 NOTE — ED PROVIDER NOTES
CHIEF COMPLAINT  Chief Complaint   Patient presents with    Abdominal Pain    High Blood Sugar     Pt BIBA for LUQ abdominal pain and a FSBS of 599. Pt has had 7/10 sharp pain for 2 days. Pt hasn't been able to eat or drink anything so hasn't taken insulin in 2days. Pt received 4mg zofran and 500ml of NS.        LIMITATION TO HISTORY   Select: none    HPI    Christoph Taylor is a 58 y.o. male who presents to the Emergency Department presents for evaluation of nausea vomiting as well as left upper quadrant abdominal pain which has been ongoing for the past 2 days.  Patient does have is history of insulin-dependent diabetes, CKD.  Patient stated that few days ago he had a several episodes of nonbloody nonbilious vomiting subsequently developed a left lower quadrant abdominal pain which she described as a sharp achy sensation.  Stated that he stopped taking his insulin, as he was not eating and he noticed his blood sugars have been running high.    OUTSIDE HISTORIAN(S):  Select: EMS reports blood sugar was 599     EXTERNAL RECORDS REVIEWED  Select: Other reviewed note from [facility] in 2023 was for abdominal pain does have a history of C. difficile, as well as insulin-dependent diabetes      PAST MEDICAL HISTORY  Past Medical History:   Diagnosis Date    Abnormal electrocardiogram (ECG) (EKG) 11/8/2021    KRISTAL (acute kidney injury) (HCC) 11/8/2021    Chest pain in adult 11/8/2021    CKD (chronic kidney disease) stage 3, GFR 30-59 ml/min 11/7/2021    Diabetes (HCC)     Diabetic ketoacidosis without coma associated with type 2 diabetes mellitus (HCC) 11/7/2021    Diarrhea 3/18/2022    DKA, type 1, not at goal (HCC) 7/28/2022    Esophagitis 7/28/2022    [place] (hard of hearing)     Very [place] No hearing aides    Hypercholesteremia      .    SURGICAL HISTORY  Past Surgical History:   Procedure Laterality Date    FINGER OR HAND INCISION AND DRAINAGE Right 6/12/2023    Procedure: INCISION AND DRAINAGE, HAND - THUMB;   Surgeon: Ace Shaw M.D.;  Location: SURGERY [facility];  Service: Orthopedics    CT UPPER GI ENDOSCOPY,DIAGNOSIS N/A 3/14/2023    Procedure: GASTROSCOPY;  Surgeon: Atilio Freed M.D.;  Location: SURGERY [facility];  Service: Gastroenterology    OTHER      appy, lumbar fusion         FAMILY HISTORY  Family History   Problem Relation Age of Onset    Heart Disease Father           SOCIAL HISTORY  Social History     Socioeconomic History    Marital status: Single     Spouse name: Not on file    Number of children: Not on file    Years of education: Not on file    Highest education level: Not on file   Occupational History    Not on file   Tobacco Use    Smoking status: Former    Smokeless tobacco: Never   Vaping Use    Vaping Use: Not on file   Substance and Sexual Activity    Alcohol use: Not Currently    Drug use: Not Currently    Sexual activity: Not on file   Other Topics Concern    Not on file   Social History Narrative    Not on file     Social Determinants of Health     Financial Resource Strain: Not on file   Food Insecurity: Not on file   Transportation Needs: Not on file   Physical Activity: Not on file   Stress: Not on file   Social Connections: Not on file   Intimate Partner Violence: Not on file   Housing Stability: Not on file         CURRENT MEDICATIONS  No current facility-administered medications on file prior to encounter.     Current Outpatient Medications on File Prior to Encounter   Medication Sig Dispense Refill    gabapentin (NEURONTIN) 300 MG Cap Take 1 Capsule by mouth 3 times a day. 90 Capsule 0    metoclopramide (REGLAN) 10 MG Tab Take 1 Tablet by mouth 4 times a day as needed (abdominal pain, nasuea). 60 Tablet 0    sucralfate (CARAFATE) 1 GM Tab Take 1 Tablet by mouth every 6 hours. 120 Tablet 0    ketorolac (TORADOL) 10 MG Tab Take 1 Tablet by mouth 3 times a day as needed for Moderate Pain. With food 15 Tablet 0    ondansetron (ZOFRAN ODT) 4 MG TABLET DISPERSIBLE Take 1  Tablet by mouth every 6 hours as needed for Nausea/Vomiting. 10 Tablet 0    insulin lispro 100 UNIT/ML INJ Inject 8-20 Units under the skin 3 times a day before meals. Sliding Scale      tamsulosin (FLOMAX) 0.4 MG capsule Take 0.4 mg by mouth every day.             ALLERGIES  No Known Allergies    PHYSICAL EXAM  VITAL SIGNS:/86   Pulse 98   Temp 36.9 °C (98.4 °F) (Temporal)   Resp 18   Ht 1.829 m (6')   Wt 81.6 kg (180 lb)   SpO2 100%   BMI 24.41 kg/m²       VITALS - vital signs documented prior to this note have been reviewed and noted,  GENERAL - awake, alert, oriented, GCS 15, no apparent distress, non-toxic  appearing  HEENT - normocephalic, atraumatic, pupils equal, sclera anicteric, mucus  membranes moist  NECK - supple, no meningismus, full active range of motion, trachea midline  CARDIOVASCULAR - regular rate/rhythm, no murmurs/gallops/rubs  PULMONARY - no respiratory distress, speaking in full sentences, clear to  auscultation bilaterally, no wheezing/ronchi/rales, no accessory muscle use  GASTROINTESTINAL -mild left upper quadrant tenderness on examination otherwise soft, non-tender, non-distended, no rebound, guarding,  or peritonitis  GENITOURINARY - Deferred  NEUROLOGIC - Awake alert, normal mental status, speech fluid, cognition  normal, moves all extremities  MUSCULOSKELETAL - no obvious asymmetry or deformities present  EXTREMITIES - warm, well-perfused, no cyanosis or significant edema  DERMATOLOGIC - warm, dry, no rashes, no jaundice  PSYCHIATRIC - normal affect, normal insight, normal concentration    DIAGNOSTIC STUDIES / PROCEDURES    LABS  Labs Reviewed   BETA-HYDROXYBUTYRIC ACID - Abnormal; Notable for the following components:       Result Value    beta-Hydroxybutyric Acid 7.50 (*)     All other components within normal limits   CBC WITH DIFFERENTIAL - Abnormal; Notable for the following components:    WBC 11.4 (*)     Hemoglobin 13.9 (*)     Hematocrit 41.6 (*)      Neutrophils-Polys 90.40 (*)     Lymphocytes 7.40 (*)     Neutrophils (Absolute) 10.29 (*)     Lymphs (Absolute) 0.84 (*)     All other components within normal limits   COMP METABOLIC PANEL - Abnormal; Notable for the following components:    Chloride 87 (*)     Anion Gap 23.0 (*)     Glucose 506 (*)     Bun 33 (*)     AST(SGOT) 9 (*)     Alkaline Phosphatase 107 (*)     All other components within normal limits   LIPASE - Abnormal; Notable for the following components:    Lipase 5 (*)     All other components within normal limits   POCT GLUCOSE DEVICE RESULTS - Abnormal; Notable for the following components:    POC Glucose, Blood 365 (*)     All other components within normal limits   MAGNESIUM   PHOSPHORUS   VENOUS BLOOD GAS   ESTIMATED GFR       Elevated blood glucose reading without evidence of acidosis not consistent with diabetic ketoacidosis.      Radiologist interpretation:   No orders to display        COURSE & MEDICAL DECISION MAKING    ED COURSE:        INTERVENTIONS BY ME:  Medications   lactated ringers (LR) bolus (0 mL Intravenous Stopped 5/6/24 0441)   metoclopramide (Reglan) injection 10 mg (10 mg Intravenous Given 5/6/24 0252)   fentaNYL (Sublimaze) injection 100 mcg (100 mcg Intravenous Given 5/6/24 0252)   insulin regular (HumuLIN R,NovoLIN R) injection (10 Units Intravenous Given 5/6/24 0328)   ketorolac (Toradol) 15 MG/ML injection 15 mg (15 mg Intravenous Given 5/6/24 0500)       Response on recheck: Reevaluation at 0 430 patient is tolerating p.o. no episodes of vomiting pain is improved.  Blood sugar has improved to 365, recommend the patient resume his insulin, otherwise given that he is now tolerating p.o. his repeat abdominal exam is reassuring to believe he is appropriate for outpatient management..        Hydration: Based on the patient's presentation of Acute Vomiting and Dehydration the patient was given IV fluids. IV Hydration was used because oral hydration was not adequate alone.  Upon recheck following hydration, the patient was improved.     INITIAL ASSESSMENT, COURSE AND PLAN  Care Narrative: Patient presented for evaluation of left upper quadrant abdominal pain epigastric pain as well as nausea vomiting and elevated blood pressure readings.  Sugar readings.  Differential include was not limited to diabetic ketoacidosis hyperglycemia, pancreatitis, gastritis, peptic ulcer disease, colitis enteritis, patient is passing gas having bowel movements, lowering concern for small bowel obstruction among many other considerations.  Patient's blood sugar was high at 599 per EMS.  Thus an IV was established she was bolused with 2 L of fluids and labs were obtained.  His CMP did show an elevated anion gap without evidence of acidosis, ketones are elevated believe this is the cause of the patient's anion gap.  VBG does not demonstrate any acidosis, not consistent with diabetic ketoacidosis.  Given the patient's elevated anion gap, and hyperglycemia he was given 10 units of insulin.  After insulin and fluids the blood sugar did improve to 365.  He was given a p.o. trial and was tolerating p.o. with no episodes of vomiting in the emergency department and his repeat abdominal exam is reassuring without any evidence of rebound guarding or peritonitis and his pain is improved.  At this time lowering concern for acute surgical process as a cause of this patient presentation this evening thus we will instruct the patient on a brat diet, prescribe him Reglan Pepcid Carafate, and instruct him to return for an abdominal recheck should his symptoms not improve.  He is also instructed to resume his insulin and monitor his blood sugars at home carefully.  Return precautions were discussed at length and patient was discharged in stable condition.             ADDITIONAL PROBLEM LIST  History of diabetes  DISPOSITION AND DISCUSSIONS      Escalation of care considered, and ultimately not performed:diagnostic imaging  consider CT abdomen pelvis though after fluids insulin and pain medication patient's abdominal pain was improved he was tolerating p.o., thus will defer CT on pelvis at this time and lieu of having the patient return for an abdominal recheck in the next 12 to 24 hours should his symptoms not improve.      Decision tools and prescription drugs considered including, but not limited to:  Reglan Pepcid Carafate also patient was instructed to resume his insulin .    FINAL DIAGNOSIS  1. Hyperglycemia Acute   2. Nausea and vomiting, unspecified vomiting type Acute   3. Dehydration Acute   4. Left upper quadrant abdominal pain Acute   5. Epigastric abdominal pain             Electronically signed by: Harris Shetty DO ,5:02 AM 05/06/24

## 2024-05-06 NOTE — ED NOTES
Patient yelling and on the call light every minute asking for more pain medication.  Informed patient waiting for the MD to get more orders and will be back

## 2024-05-06 NOTE — ED PROVIDER NOTES
ED Provider Note    CHIEF COMPLAINT  Chief Complaint   Patient presents with    Abdominal Pain     LUQ x3 days with N/V/D. Denies noting any bloody emesis or stools. Denies known fevers.     EXTERNAL RECORDS REVIEWED  Patient was seen here in the emergency department yesterday for similar complaints.  He was found to have an elevated blood sugar however no evidence of DKA.  Given fluids and insulin with improvement.  No imaging was performed.  He has been seen a few times this year for ongoing upper abdominal pain.  Last CT scan in January 2024 was normal.    HPI/ROS  LIMITATION TO HISTORY   Select: : None  OUTSIDE HISTORIAN(S):  None    Christoph Taylor is a 58 y.o. male who presents to the Emergency Department with abdominal pain and vomiting.  Patient reports ongoing chronic symptoms of left-sided upper abdominal pain and states this is similar to his his symptoms in the past.  He has had ongoing vomiting since yesterday and has been unable to keep food or fluids down.  He was unable to fill any of the prescriptions that were provided for him upon discharge.  He has had some associated diarrhea.  No pain with urination or fevers.    PAST MEDICAL HISTORY  Past Medical History:   Diagnosis Date    Abnormal electrocardiogram (ECG) (EKG) 11/8/2021    KRISTAL (acute kidney injury) (HCC) 11/8/2021    Chest pain in adult 11/8/2021    CKD (chronic kidney disease) stage 3, GFR 30-59 ml/min 11/7/2021    Diabetes (HCC)     Diabetic ketoacidosis without coma associated with type 2 diabetes mellitus (HCC) 11/7/2021    Diarrhea 3/18/2022    DKA, type 1, not at goal (HCC) 7/28/2022    Esophagitis 7/28/2022    [place] (hard of hearing)     Very [place] No hearing aides    Hypercholesteremia         SURGICAL HISTORY  Past Surgical History:   Procedure Laterality Date    FINGER OR HAND INCISION AND DRAINAGE Right 6/12/2023    Procedure: INCISION AND DRAINAGE, HAND - THUMB;  Surgeon: Ace Shaw M.D.;  Location: SURGERY [facility];   Service: Orthopedics    VT UPPER GI ENDOSCOPY,DIAGNOSIS N/A 3/14/2023    Procedure: GASTROSCOPY;  Surgeon: Atilio Freed M.D.;  Location: SURGERY [facility];  Service: Gastroenterology    OTHER      appy, lumbar fusion        FAMILY HISTORY  Family History   Problem Relation Age of Onset    Heart Disease Father        SOCIAL HISTORY   reports that he has quit smoking. He has never used smokeless tobacco. He reports that he does not currently use alcohol. He reports that he does not currently use drugs.    CURRENT MEDICATIONS  Previous Medications    ASPIRIN 81 MG EC TABLET    Take 81 mg by mouth every evening.    FAMOTIDINE (PEPCID) 20 MG TAB    Take 1 Tablet by mouth 2 times a day.    INSULIN GLARGINE (LANTUS) 100 UNIT/ML SC SOLN    Inject 10-25 Units under the skin 2 times a day. Pt reports that this medication is prescribed 20 units BID, pt reports that he does a sliding scale ranging from 10-25 units BID    METOCLOPRAMIDE (REGLAN) 10 MG TAB    Take 1 Tablet by mouth 3 times a day as needed (nausea vomiting).    MULTIVITAMIN TAB    Take 1 Tablet by mouth every evening.    OXYCODONE IMMEDIATE-RELEASE (ROXICODONE) 5 MG TAB    Take 5 mg by mouth 2 times a day.    SUCRALFATE (CARAFATE) 1 GM TAB    Take 1 Tablet by mouth 3 times a day before meals for 7 days.       ALLERGIES  Patient has no known allergies.    PHYSICAL EXAM  /62   Pulse 88   Temp 36.7 °C (98.1 °F) (Temporal)   Resp 16   Ht 1.829 m (6')   Wt 81.6 kg (180 lb)   SpO2 98%      Constitutional: Disheveled chronically appearing. Alert in no apparent distress.  HENT: Normocephalic, Atraumatic. Bilateral external ears normal. Nose normal.  Moist mucous membranes.  Oropharynx clear.  Eyes: Pupils are equal and reactive. Conjunctiva normal.   Neck: Supple, full range of motion  Heart: Regular rate and rhythm.  No murmurs.    Lungs: No respiratory distress, normal work of br eathing. Lungs clear to auscultation bilaterally.  Abdomen Soft, no  distention.  LUQ tenderness to palpation.  Musculoskeletal: Atraumatic. No obvious deformities noted.  No lower extremity edema.  Skin: Warm, Dry.  No erythema, No rash.   Neurologic: Alert and oriented x3. Moving all extremities spontaneously without focal deficits.  Psychiatric: Affect normal, Mood normal, Appears appropriate and not intoxicated.      DIAGNOSTIC STUDIES / PROCEDURES    EKG  I have independently interpreted this EKG  Results for orders placed or performed during the hospital encounter of 24   EKG   Result Value Ref Range    Report       [facility] Emergency Dept.    Test Date:  2024  Pt Name:    BAILEE JACOBSON               Department: [facility]  MRN:        [id number]                      Room:  Gender:     Male                         Technician: [id number]  :        1965                   Requested By:ER TRIAGE PROTOCOL  Order #:    [id number]                    Reading MD: Brenna Swan MD    Measurements  Intervals                                Axis  Rate:       90                           P:          82  CA:         154                          QRS:        87  QRSD:       95                           T:          53  QT:         392  QTc:        480    Interpretive Statements  Sinus rhythm  Borderline prolonged QT interval  No acute ST or T wave change  Compared to ECG 2024 09:21:29  No significant changes  Electronically Signed On 2024 15:39:29 PDT by Brenna Swan MD         LABS  Labs Reviewed   CBC WITH DIFFERENTIAL - Abnormal; Notable for the following components:       Result Value    WBC 15.3 (*)     Hemoglobin 13.9 (*)     Hematocrit 41.2 (*)     Neutrophils-Polys 89.80 (*)     Lymphocytes 5.40 (*)     Neutrophils (Absolute) 13.76 (*)     Lymphs (Absolute) 0.82 (*)     All other components within normal limits   COMP METABOLIC PANEL - Abnormal; Notable for the following components:    Chloride 87 (*)     Anion Gap 26.0 (*)     Glucose 556  (*)     Bun 41 (*)     AST(SGOT) 10 (*)     Alkaline Phosphatase 107 (*)     All other components within normal limits   TROPONIN - Abnormal; Notable for the following components:    Troponin T 38 (*)     All other components within normal limits   VENOUS BLOOD GAS - Abnormal; Notable for the following components:    Venous Bg Po2 51.7 (*)     All other components within normal limits   BETA-HYDROXYBUTYRIC ACID - Abnormal; Notable for the following components:    beta-Hydroxybutyric Acid >8.00 (*)     All other components within normal limits   POCT GLUCOSE DEVICE RESULTS - Abnormal; Notable for the following components:    POC Glucose, Blood 437 (*)     All other components within normal limits   LIPASE   ESTIMATED GFR   URINALYSIS   BASIC METABOLIC PANEL   MAGNESIUM         RADIOLOGY  I have independently interpreted the diagnostic imaging associated with this visit and am waiting the final reading from the radiologist.   My preliminary interpretation is as follows: scattered infiltrates    Radiologist interpretation:  CT-ABDOMEN-PELVIS WITH   Final Result   Addendum (preliminary) 1 of 1   Addendum: There is stable left renal pelvocaliectasis.      Final      1.  Mild distal esophageal wall thickening, question esophagitis.   2.  Horseshoe kidney.   3.  2 left renal calculi.      DX-CHEST-PORTABLE (1 VIEW)   Final Result      Mildly increased interstitial markings are noted at the lung bases, left worse than right.            COURSE & MEDICAL DECISION MAKING      ASSESSMENT, COURSE AND PLAN  Care Narrative: Patient with history of diabetes and chronic abdominal pain who presents with ongoing abdominal pain and vomiting after being recently seen here yesterday for the same.  He is afebrile with normal vital signs on arrival however very uncomfortable appearing.  Labs are significant for leukocytosis of 15 in addition to a blood sugar of 556.  The patient has an elevated anion gap as well as elevated beta hydroxy  however has normal CO2 and no acidosis on venous blood gas.  This seems consistent with starvation ketosis rather than DKA.  Imaging was performed due to persistent pain despite medication and does not show evidence of bowel obstruction or perforation, appendicitis, diverticulitis or other surgical process.  He does have findings of distal esophagitis and symptoms seem to be related to gastritis/peptic ulcer disease.  Patient continues to have persistent symptoms here therefore we will plan for admission for symptom control and correction of dehydration.    4:30 PM - Upon reassessment, patient is resting comfortably with normal vital signs.  No new complaints at this time.  Discussed results with patient and/or family as well as plan of care for admission, patient is agreeable.    ADDITIONAL PROBLEM LIST  Problem #1: LUQ abdominal pain -likely related to gastritis/peptic ulcer disease, needs to be discharged home on PPI with GI follow-up    Problem #2: Hyperglycemia -given fluids and 8 units IV insulin, continue to monitor    Problem #3: Starvation ketosis -given IV fluids, continue to monitor    DISPOSITION AND DISCUSSIONS  I have discussed management of the patient with the following physicians and MICHELLE's: Dr. Wang, hospitalist      DISPOSITION:  Patient will be hospitalized by Dr. Wang in guarded condition.      FINAL DIAGNOSIS  1. Epigastric abdominal pain    2. Hyperglycemia

## 2024-05-06 NOTE — ED NOTES
PT discharged home. PT verbalizes understanding of discharge instructions. PT to lobby via wheelchair.

## 2024-05-06 NOTE — ED TRIAGE NOTES
Pt to triage in wheelchair c/o worsening LUQ abd pain with nausea and vomiting with last emesis occurring 30 min PTA. Pt was seen here yesterday for same complaints. Pt was unable to  prescribed medicines

## 2024-05-06 NOTE — ED NOTES
Medication history reviewed with pt. Med rec is complete.  Allergies reviewed, per pt    Pt reports that he is to take his LANTUS 20 units BID, but pt reports that he uses a sliding scale and injects 10-25 units BID    Patient has not had any outpatient antibiotics in the last 30 days.    Pt is not on any anticoagulants

## 2024-05-06 NOTE — ED TRIAGE NOTES
Chief Complaint   Patient presents with    Abdominal Pain     LUQ x3 days with N/V/D. Denies noting any bloody emesis or stools. Denies known fevers.   Physical Exam  Pulmonary:      Effort: Pulmonary effort is normal.   Skin:     General: Skin is warm and dry.   Neurological:      Mental Status: He is alert.

## 2024-05-06 NOTE — ED NOTES
Pt medicated per MAR, pt requesting more blankets and water. Pt provided warm blankets. ERP aware of request for water, orders received. Pt provided water

## 2024-05-06 NOTE — ED NOTES
Insulin given as ordered for  verified by two Rns.    Patient updated with his plan of care.  Small amounts of ice water given by MD.

## 2024-05-06 NOTE — ED TRIAGE NOTES
Chief Complaint   Patient presents with    Abdominal Pain    High Blood Sugar     Pt BIBA for LUQ abdominal pain and a FSBS of 599. Pt has had 7/10 sharp pain for 2 days. Pt hasn't been able to eat or drink anything so hasn't taken insulin in 2days. Pt received 4mg zofran and 500ml of NS.      /83   Pulse 94   Temp 36.3 °C (97.4 °F) (Temporal)   Resp 16   Ht 1.829 m (6')   Wt 81.6 kg (180 lb)   SpO2 97%   BMI 24.41 kg/m²

## 2024-05-07 PROBLEM — R10.13 EPIGASTRIC PAIN: Status: ACTIVE | Noted: 2024-05-07

## 2024-05-07 LAB
ANION GAP SERPL CALC-SCNC: 13 MMOL/L (ref 7–16)
ANION GAP SERPL CALC-SCNC: 8 MMOL/L (ref 7–16)
B-OH-BUTYR SERPL-MCNC: 0.8 MMOL/L (ref 0.02–0.27)
BUN SERPL-MCNC: 34 MG/DL (ref 8–22)
BUN SERPL-MCNC: 40 MG/DL (ref 8–22)
CALCIUM SERPL-MCNC: 7.6 MG/DL (ref 8.4–10.2)
CALCIUM SERPL-MCNC: 7.7 MG/DL (ref 8.4–10.2)
CHLORIDE SERPL-SCNC: 97 MMOL/L (ref 96–112)
CHLORIDE SERPL-SCNC: 97 MMOL/L (ref 96–112)
CO2 SERPL-SCNC: 27 MMOL/L (ref 20–33)
CO2 SERPL-SCNC: 30 MMOL/L (ref 20–33)
CREAT SERPL-MCNC: 0.79 MG/DL (ref 0.5–1.4)
CREAT SERPL-MCNC: 0.94 MG/DL (ref 0.5–1.4)
ERYTHROCYTE [DISTWIDTH] IN BLOOD BY AUTOMATED COUNT: 41.5 FL (ref 35.9–50)
EST. AVERAGE GLUCOSE BLD GHB EST-MCNC: 332 MG/DL
GFR SERPLBLD CREATININE-BSD FMLA CKD-EPI: 102 ML/MIN/1.73 M 2
GFR SERPLBLD CREATININE-BSD FMLA CKD-EPI: 93 ML/MIN/1.73 M 2
GLUCOSE BLD STRIP.AUTO-MCNC: 149 MG/DL (ref 65–99)
GLUCOSE BLD STRIP.AUTO-MCNC: 172 MG/DL (ref 65–99)
GLUCOSE BLD STRIP.AUTO-MCNC: 178 MG/DL (ref 65–99)
GLUCOSE BLD STRIP.AUTO-MCNC: 337 MG/DL (ref 65–99)
GLUCOSE SERPL-MCNC: 315 MG/DL (ref 65–99)
GLUCOSE SERPL-MCNC: 357 MG/DL (ref 65–99)
HBA1C MFR BLD: 13.2 % (ref 4–5.6)
HCT VFR BLD AUTO: 34.3 % (ref 42–52)
HGB BLD-MCNC: 11.2 G/DL (ref 14–18)
MCH RBC QN AUTO: 28.1 PG (ref 27–33)
MCHC RBC AUTO-ENTMCNC: 32.7 G/DL (ref 32.3–36.5)
MCV RBC AUTO: 86 FL (ref 81.4–97.8)
PLATELET # BLD AUTO: 228 K/UL (ref 164–446)
PMV BLD AUTO: 9.7 FL (ref 9–12.9)
POTASSIUM SERPL-SCNC: 4.3 MMOL/L (ref 3.6–5.5)
POTASSIUM SERPL-SCNC: 4.5 MMOL/L (ref 3.6–5.5)
RBC # BLD AUTO: 3.99 M/UL (ref 4.7–6.1)
SODIUM SERPL-SCNC: 135 MMOL/L (ref 135–145)
SODIUM SERPL-SCNC: 137 MMOL/L (ref 135–145)
WBC # BLD AUTO: 12.2 K/UL (ref 4.8–10.8)

## 2024-05-07 PROCEDURE — 99232 SBSQ HOSP IP/OBS MODERATE 35: CPT | Performed by: INTERNAL MEDICINE

## 2024-05-07 RX ORDER — KETOROLAC TROMETHAMINE 15 MG/ML
15 INJECTION, SOLUTION INTRAMUSCULAR; INTRAVENOUS EVERY 6 HOURS PRN
Status: DISCONTINUED | OUTPATIENT
Start: 2024-05-07 | End: 2024-05-09

## 2024-05-07 RX ORDER — MINERAL OIL/HYDROPHIL PETROLAT
OINTMENT (GRAM) TOPICAL 2 TIMES DAILY
Status: DISCONTINUED | OUTPATIENT
Start: 2024-05-07 | End: 2024-05-07

## 2024-05-07 RX ORDER — PANTOPRAZOLE SODIUM 40 MG/10ML
40 INJECTION, POWDER, LYOPHILIZED, FOR SOLUTION INTRAVENOUS DAILY
Status: DISCONTINUED | OUTPATIENT
Start: 2024-05-07 | End: 2024-05-08

## 2024-05-07 RX ORDER — MINERAL OIL/HYDROPHIL PETROLAT
OINTMENT (GRAM) TOPICAL 2 TIMES DAILY
Status: DISCONTINUED | OUTPATIENT
Start: 2024-05-07 | End: 2024-05-13 | Stop reason: HOSPADM

## 2024-05-07 RX ADMIN — WHITE PETROLATUM: 1.75 OINTMENT TOPICAL at 21:03

## 2024-05-07 RX ADMIN — KETOROLAC TROMETHAMINE 15 MG: 15 INJECTION, SOLUTION INTRAMUSCULAR; INTRAVENOUS at 23:54

## 2024-05-07 RX ADMIN — OXYCODONE HYDROCHLORIDE 5 MG: 5 TABLET ORAL at 07:14

## 2024-05-07 RX ADMIN — SODIUM CHLORIDE: 9 INJECTION, SOLUTION INTRAVENOUS at 00:52

## 2024-05-07 RX ADMIN — HEPARIN SODIUM 5000 UNITS: 5000 INJECTION, SOLUTION INTRAVENOUS; SUBCUTANEOUS at 06:07

## 2024-05-07 RX ADMIN — SODIUM CHLORIDE: 9 INJECTION, SOLUTION INTRAVENOUS at 07:06

## 2024-05-07 RX ADMIN — PANTOPRAZOLE SODIUM 40 MG: 40 INJECTION, POWDER, LYOPHILIZED, FOR SOLUTION INTRAVENOUS at 11:52

## 2024-05-07 RX ADMIN — INSULIN GLARGINE-YFGN 10 UNITS: 100 INJECTION, SOLUTION SUBCUTANEOUS at 06:07

## 2024-05-07 RX ADMIN — HEPARIN SODIUM 5000 UNITS: 5000 INJECTION, SOLUTION INTRAVENOUS; SUBCUTANEOUS at 21:08

## 2024-05-07 RX ADMIN — FAMOTIDINE 20 MG: 20 TABLET ORAL at 17:48

## 2024-05-07 RX ADMIN — HEPARIN SODIUM 5000 UNITS: 5000 INJECTION, SOLUTION INTRAVENOUS; SUBCUTANEOUS at 14:10

## 2024-05-07 RX ADMIN — FAMOTIDINE 20 MG: 20 TABLET ORAL at 06:02

## 2024-05-07 RX ADMIN — SUCRALFATE 1 G: 1 TABLET ORAL at 06:02

## 2024-05-07 RX ADMIN — ASPIRIN 81 MG: 81 TABLET, COATED ORAL at 17:48

## 2024-05-07 RX ADMIN — SUCRALFATE 1 G: 1 TABLET ORAL at 11:25

## 2024-05-07 RX ADMIN — SUCRALFATE 1 G: 1 TABLET ORAL at 17:48

## 2024-05-07 ASSESSMENT — COGNITIVE AND FUNCTIONAL STATUS - GENERAL
EATING MEALS: A LITTLE
PERSONAL GROOMING: A LITTLE
SUGGESTED CMS G CODE MODIFIER DAILY ACTIVITY: CK
HELP NEEDED FOR BATHING: A LITTLE
TOILETING: A LITTLE
DAILY ACTIVITIY SCORE: 19
DRESSING REGULAR LOWER BODY CLOTHING: A LITTLE

## 2024-05-07 ASSESSMENT — LIFESTYLE VARIABLES
HAVE YOU EVER FELT YOU SHOULD CUT DOWN ON YOUR DRINKING: NO
TOTAL SCORE: 0
EVER HAD A DRINK FIRST THING IN THE MORNING TO STEADY YOUR NERVES TO GET RID OF A HANGOVER: NO
EVER FELT BAD OR GUILTY ABOUT YOUR DRINKING: NO
CONSUMPTION TOTAL: NEGATIVE
TOTAL SCORE: 0
ON A TYPICAL DAY WHEN YOU DRINK ALCOHOL HOW MANY DRINKS DO YOU HAVE: 1
ALCOHOL_USE: YES
HOW MANY TIMES IN THE PAST YEAR HAVE YOU HAD 5 OR MORE DRINKS IN A DAY: 0
TOTAL SCORE: 0
DOES PATIENT WANT TO STOP DRINKING: NO
AVERAGE NUMBER OF DAYS PER WEEK YOU HAVE A DRINK CONTAINING ALCOHOL: 0.25
HAVE PEOPLE ANNOYED YOU BY CRITICIZING YOUR DRINKING: NO

## 2024-05-07 ASSESSMENT — PATIENT HEALTH QUESTIONNAIRE - PHQ9
9. THOUGHTS THAT YOU WOULD BE BETTER OFF DEAD, OR OF HURTING YOURSELF: NOT AT ALL
7. TROUBLE CONCENTRATING ON THINGS, SUCH AS READING THE NEWSPAPER OR WATCHING TELEVISION: NEARLY EVERY DAY
SUM OF ALL RESPONSES TO PHQ QUESTIONS 1-9: 21
1. LITTLE INTEREST OR PLEASURE IN DOING THINGS: NEARLY EVERY DAY
SUM OF ALL RESPONSES TO PHQ9 QUESTIONS 1 AND 2: 6
6. FEELING BAD ABOUT YOURSELF - OR THAT YOU ARE A FAILURE OR HAVE LET YOURSELF OR YOUR FAMILY DOWN: NEARLY EVERY DAY
3. TROUBLE FALLING OR STAYING ASLEEP OR SLEEPING TOO MUCH: NEARLY EVERY DAY
8. MOVING OR SPEAKING SO SLOWLY THAT OTHER PEOPLE COULD HAVE NOTICED. OR THE OPPOSITE, BEING SO FIGETY OR RESTLESS THAT YOU HAVE BEEN MOVING AROUND A LOT MORE THAN USUAL: NOT AT ALL
4. FEELING TIRED OR HAVING LITTLE ENERGY: NEARLY EVERY DAY
2. FEELING DOWN, DEPRESSED, IRRITABLE, OR HOPELESS: NEARLY EVERY DAY
5. POOR APPETITE OR OVEREATING: NEARLY EVERY DAY

## 2024-05-07 ASSESSMENT — ENCOUNTER SYMPTOMS
VOMITING: 0
FEVER: 0
NAUSEA: 1
ABDOMINAL PAIN: 1
CHILLS: 0
WEAKNESS: 1

## 2024-05-07 ASSESSMENT — PAIN DESCRIPTION - PAIN TYPE
TYPE: ACUTE PAIN
TYPE: ACUTE PAIN;CHRONIC PAIN

## 2024-05-07 ASSESSMENT — ACTIVITIES OF DAILY LIVING (ADL): TOILETING: INDEPENDENT

## 2024-05-07 NOTE — ED NOTES
Attempted to call report but patient's blood pressure reading was low, Hospitalist paged and orders received.  LR bolus 2000 Liters initiated and infusing

## 2024-05-07 NOTE — CARE PLAN
The patient is Watcher - Medium risk of patient condition declining or worsening    Shift Goals  Clinical Goals: improved BG, wean O2    Progress made toward(s) clinical / shift goals:        Problem: Pain - Standard  Goal: Alleviation of pain or a reduction in pain to the patient’s comfort goal  Outcome: Progressing   Patient reports reduction in abdominal pain post interventions.   Problem: Knowledge Deficit - Standard  Goal: Patient and family/care givers will demonstrate understanding of plan of care, disease process/condition, diagnostic tests and medications  Outcome: Progressing   Discussed plan of care with patient including medications, monitoring labs, diet, and controlling blood glucose. Patient is agreeable and verbalized understanding.     Patient is not progressing towards the following goals:

## 2024-05-07 NOTE — PROGRESS NOTES
Hospital Medicine Daily Progress Note    Date of Service  5/7/2024    Chief Complaint  Christoph Taylor is a 58 y.o. male admitted 5/6/2024 with poorly controlled type 2 diabetes mellitus secondary to medical nonadherence of an unknown reason.  He presented to the hospital [facility] which is now improving.  No clear evidence of concurrent infection.    Hospital Course  See above    Interval Problem Update  5/7  Sugars are slowly improving, complains of constant epigastric pain and nausea that has been there for months. He thinks he might have had an egd in the past.     I have discussed this patient's plan of care and discharge plan at IDT rounds today with Case Management, Nursing, Nursing leadership, and other members of the IDT team.    Consultants/Specialty  none    Code Status  Full Code    Disposition  The patient is not medically cleared for discharge to home or a post-acute facility.  Anticipate discharge to: home with organized home healthcare and close outpatient follow-up    I have placed the appropriate orders for post-discharge needs.    Review of Systems  Review of Systems   Constitutional:  Negative for chills and fever.   Gastrointestinal:  Positive for abdominal pain and nausea. Negative for vomiting.   Neurological:  Positive for weakness.        Physical Exam  Temp:  [36.7 °C (98 °F)-36.9 °C (98.4 °F)] 36.7 °C (98 °F)  Pulse:  [79-93] 81  Resp:  [14-19] 16  BP: ()/(42-83) 105/42  SpO2:  [87 %-100 %] 98 %    Physical Exam  Vitals and nursing note reviewed.   Constitutional:       General: He is not in acute distress.     Appearance: He is well-developed.      Comments: Flat affect, appears slightly uncomfortable   HENT:      Head: Normocephalic and atraumatic.      Mouth/Throat:      Pharynx: No oropharyngeal exudate.   Eyes:      General: No scleral icterus.     Pupils: Pupils are equal, round, and reactive to light.   Neck:      Thyroid: No thyromegaly.   Cardiovascular:      Rate and  Rhythm: Normal rate and regular rhythm.      Heart sounds: Normal heart sounds. No murmur heard.  Pulmonary:      Effort: Pulmonary effort is normal. No respiratory distress.      Breath sounds: Normal breath sounds. No wheezing.   Abdominal:      General: Bowel sounds are normal. There is no distension.      Palpations: Abdomen is soft.      Tenderness: There is abdominal tenderness (mild to moderate in the epigastric region). There is no guarding or rebound.   Musculoskeletal:         General: No tenderness. Normal range of motion.      Cervical back: Normal range of motion and neck supple.      Right lower leg: No edema.      Left lower leg: No edema.      Comments: Brace on the LLE   Skin:     General: Skin is warm and dry.      Findings: No rash.   Neurological:      Mental Status: He is alert and oriented to person, place, and time.      Cranial Nerves: No cranial nerve deficit.         Fluids    Intake/Output Summary (Last 24 hours) at 5/7/2024 1129  Last data filed at 5/7/2024 0745  Gross per 24 hour   Intake 5976.26 ml   Output 350 ml   Net 5626.26 ml       Laboratory  Recent Labs     05/06/24  0245 05/06/24  1430 05/07/24  0041   WBC 11.4* 15.3* 12.2*   RBC 4.98 4.92 3.99*   HEMOGLOBIN 13.9* 13.9* 11.2*   HEMATOCRIT 41.6* 41.2* 34.3*   MCV 83.5 83.7 86.0   MCH 27.9 28.3 28.1   MCHC 33.4 33.7 32.7   RDW 40.3 40.8 41.5   PLATELETCT 194 226 228   MPV 9.5 9.6 9.7     Recent Labs     05/06/24  1747 05/07/24  0041 05/07/24  0625   SODIUM 135 135 137   POTASSIUM 4.6 4.5 4.3   CHLORIDE 92* 97 97   CO2 27 30 27   GLUCOSE 478* 357* 315*   BUN 44* 40* 34*   CREATININE 0.97 0.94 0.79   CALCIUM 8.1* 7.6* 7.7*                   Imaging  CT-ABDOMEN-PELVIS WITH   Final Result   Addendum (preliminary) 1 of 1   Addendum: There is stable left renal pelvocaliectasis.      Final      1.  Mild distal esophageal wall thickening, question esophagitis.   2.  Horseshoe kidney.   3.  2 left renal calculi.      DX-CHEST-PORTABLE (1  VIEW)   Final Result      Mildly increased interstitial markings are noted at the lung bases, left worse than right.      NM-GASTRIC EMPTYING    (Results Pending)        Assessment/Plan  * Type 2 diabetes mellitus with hyperosmolar nonketotic hyperglycemia (HCC)- (present on admission)  Assessment & Plan  Patient is uncontrolled diabetes mellitus type 2  He openly endorses nonadherence and cannot give me a reason why.  Case management social work consult  Blood sugars have improved slightly  Increase Lantus to 20 units twice daily  Insulin sliding scale and check A1c  I personally reviewed the blood sugars on May 7    Epigastric pain- (present on admission)  Assessment & Plan  Unclear etiology, CTAP suggests some element of esophagitis  IV protonix 40 mg daily, carafate, gi cocktail prn  NM gastric emptying study given concurrent nausea and poorly controlled dm    Full code status- (present on admission)  Assessment & Plan  Discussed advanced directives with patient he wishes to be full CODE STATUS    Elevated beta-hydroxybutyrate- (present on admission)  Assessment & Plan  resolved    Normochromic normocytic anemia- (present on admission)  Assessment & Plan  H/H stable, personally reviewed the cbc on 5/7    Dyslipidemia- (present on admission)  Assessment & Plan  Low-fat low-cholesterol diet  Statin  Fasting lipid panel    Noncompliance with medications- (present on admission)  Assessment & Plan  Patient will need outpatient follow-ups and may be home health as well  Patient will need discussions to make sure that he becomes compliant, he has no answer    Leukocytosis- (present on admission)  Assessment & Plan  Improving, no e/o concurrent infection, defer abx's, trend wbc/fever curve         VTE prophylaxis:    heparin ppx      I have performed a physical exam and reviewed and updated ROS and Plan today (5/7/2024). In review of yesterday's note (5/6/2024), there are no changes except as documented above.

## 2024-05-07 NOTE — PROGRESS NOTES
4 Eyes Skin Assessment Completed by PENNIE Kelley and PENNIE Lane.    Head WDL  Ears Redness and Blanching  Nose WDL  Mouth WDL  Neck WDL  Breast/Chest WDL  Shoulder Blades WDL  Spine WDL  (R) Arm/Elbow/Hand Redness and Scab blanching redness on elbow, dry/cracked/calloused hand  (L) Arm/Elbow/Hand Redness and Scab blanching redness on elbow, dry/cracked/calloused hand  Abdomen WDL  Groin Redness and Blanching  Scrotum/Coccyx/Buttocks Redness and Blanching  (R) Leg Redness and Scab scattered scabbing  (L) Leg scattered scabs  (R) Heel/Foot/Toe WDL dry/flaky  (L) Heel/Foot/Toe Redness, Non-Blanching, and Scab non-blanching redness on both sides of ankle, wound on 1st toe.           Devices In Places Tele Box, Pulse Ox, and Nasal Cannula  Left foot brace in place.     Interventions In Place Gray Ear Foams and Pillows    Possible Skin Injury Yes    Pictures Uploaded Into Epic Yes  Wound Consult Placed Yes  RN Wound Prevention Protocol Ordered Yes

## 2024-05-07 NOTE — PROGRESS NOTES
Telemetry Shift Summary     Rhythm: SR  HR: 77-90  Ectopy: r PAC, r PVC    Measurements: 0.20/0.08/0.34    Normal Values  Rhythm: SR  HR:   Measurements: 0.12-0.20/0.08-0.10/0.30-0.52

## 2024-05-07 NOTE — ASSESSMENT & PLAN NOTE
NM gastric emptying study confirms severe gastroparesis and severe GERD    5/11:  Patient is tolerating diet at this time  Continue p.o. Reglan

## 2024-05-07 NOTE — H&P
Hospital Medicine History & Physical Note    Date of Service  5/6/2024    Primary Care Physician  YESY Tariq.    Consultants  critical care    Specialist Names: Dr. Ventura    Code Status  Full Code    Chief Complaint  Chief Complaint   Patient presents with    Abdominal Pain     LUQ x3 days with N/V/D. Denies noting any bloody emesis or stools. Denies known fevers.       History of Presenting Illness  Christoph Taylor is a 58 y.o. male who presented 5/6/2024 with generalized weakness with abdominal pain nausea vomiting and diarrhea.  The patient has noncompliance with diabetic management.  He comes in now on subsequent days to the emergency room with uncontrolled blood sugars and when he is prescribed blood sugar management he does not take it.  He has been out of his medications for a while.  The patient has elevated beta hydroxybutyric acid as well as elevated blood sugar of 556.  Patient will need fluid resuscitation insulin management and thus at this point twice daily Lantus has been prescribed for him he will also be on Accu-Cheks with sliding scale coverage he will need long-term diabetic management and it would be difficult to get him to be compliant.  Currently discussed that with critical care and they do not feel that the patient needs to be in the ICU.    I discussed the plan of care with patient, bedside RN, and emergency room physician Dr. Brenna Swan .    Review of Systems  Review of Systems   Constitutional:  Positive for malaise/fatigue. Negative for chills, diaphoresis and fever.   HENT:  Positive for hearing loss. Negative for nosebleeds and sinus pain.    Eyes: Negative.  Negative for double vision, photophobia, discharge and redness.   Respiratory: Negative.  Negative for cough, sputum production, shortness of breath, wheezing and stridor.    Cardiovascular: Negative.  Negative for chest pain, orthopnea, leg swelling and PND.   Gastrointestinal:  Positive for abdominal pain,  nausea and vomiting. Negative for blood in stool and heartburn.   Genitourinary: Negative.  Negative for dysuria, flank pain and frequency.   Musculoskeletal:  Negative for back pain, falls, joint pain and myalgias.   Skin: Negative.  Negative for itching.   Neurological:  Positive for headaches. Negative for dizziness, tingling, sensory change, focal weakness and seizures.   Endo/Heme/Allergies: Negative.  Negative for polydipsia. Does not bruise/bleed easily.   Psychiatric/Behavioral: Negative.  Negative for depression, substance abuse and suicidal ideas. The patient does not have insomnia.    All other systems reviewed and are negative.      Past Medical History   has a past medical history of Abnormal electrocardiogram (ECG) (EKG) (11/8/2021), KRISTAL (acute kidney injury) ([facility]) (11/8/2021), Chest pain in adult (11/8/2021), CKD (chronic kidney disease) stage 3, GFR 30-59 ml/min (11/7/2021), Diabetes ([facility]), Diabetic ketoacidosis without coma associated with type 2 diabetes mellitus ([facility]) (11/7/2021), Diarrhea (3/18/2022), DKA, type 1, not at goal ([facility]) (7/28/2022), Esophagitis (7/28/2022), [place] De[place] (hard of hearing), and Hypercholesteremia.    Surgical History   has a past surgical history that includes other; pr upper gi endoscopy,diagnosis (N/A, 3/14/2023); and finger or hand incision and drainage (Right, 6/12/2023).     Family History  family history includes Heart Disease in his father.   Family history reviewed with patient. There is no family history that is pertinent to the chief complaint.     Social History   reports that he has quit smoking. He has never used smokeless tobacco. He reports that he does not currently use alcohol. He reports that he does not currently use drugs.    Allergies  No Known Allergies    Medications  Prior to Admission Medications   Prescriptions Last Dose Informant Patient Reported? Taking?   aspirin 81 MG EC tablet 5/4/2024 at PM Patient Yes Yes   Sig: Take 81 mg by mouth every evening.    famotidine (PEPCID) 20 MG Tab NEW RX Patient No No   Sig: Take 1 Tablet by mouth 2 times a day.   insulin glargine (LANTUS) 100 UNIT/ML SC SOLN 5/4/2024 at PM Patient Yes Yes   Sig: Inject 10-25 Units under the skin 2 times a day. Pt reports that this medication is prescribed 20 units BID, pt reports that he does a sliding scale ranging from 10-25 units BID   metoclopramide (REGLAN) 10 MG Tab NEW RX Patient No No   Sig: Take 1 Tablet by mouth 3 times a day as needed (nausea vomiting).   multivitamin Tab 5/4/2024 at PM Patient Yes Yes   Sig: Take 1 Tablet by mouth every evening.   oxyCODONE immediate-release (ROXICODONE) 5 MG Tab 5/4/2024 at PM Patient Yes Yes   Sig: Take 5 mg by mouth 2 times a day.   sucralfate (CARAFATE) 1 GM Tab NEW RX Patient No No   Sig: Take 1 Tablet by mouth 3 times a day before meals for 7 days.      Facility-Administered Medications: None       Physical Exam  Temp:  [36.3 °C (97.4 °F)-36.9 °C (98.4 °F)] 36.7 °C (98.1 °F)  Pulse:  [88-98] 88  Resp:  [14-19] 16  BP: (114-158)/(62-93) 127/62  SpO2:  [88 %-100 %] 98 %  Blood Pressure: 127/62   Temperature: 36.7 °C (98.1 °F)   Pulse: 88   Respiration: 16   Pulse Oximetry: 98 %       Physical Exam  Vitals and nursing note reviewed. Exam conducted with a chaperone present.   Constitutional:       General: He is not in acute distress.     Appearance: Normal appearance. He is well-developed and normal weight. He is ill-appearing. He is not toxic-appearing or diaphoretic.   HENT:      Head: Normocephalic and atraumatic.      Right Ear: External ear normal.      Left Ear: External ear normal.      Nose: Nose normal.      Mouth/Throat:      Mouth: Mucous membranes are moist.      Pharynx: Oropharynx is clear.   Eyes:      Extraocular Movements: Extraocular movements intact.      Conjunctiva/sclera: Conjunctivae normal.      Pupils: Pupils are equal, round, and reactive to light.   Neck:      Thyroid: No thyromegaly.      Vascular: No JVD.    Cardiovascular:      Rate and Rhythm: Regular rhythm. Tachycardia present.      Pulses: Normal pulses.      Heart sounds: Normal heart sounds.   Pulmonary:      Effort: Pulmonary effort is normal. Tachypnea present.      Breath sounds: Normal breath sounds.   Chest:      Chest wall: No tenderness.   Abdominal:      General: Abdomen is flat. Bowel sounds are normal. There is distension.      Palpations: Abdomen is soft. There is no mass.      Tenderness: There is abdominal tenderness. There is no guarding or rebound.   Musculoskeletal:      Cervical back: Normal range of motion and neck supple.      Right hip: Tenderness present. Decreased range of motion.      Right lower leg: No edema.      Left lower leg: No edema.   Lymphadenopathy:      Cervical: No cervical adenopathy.   Skin:     General: Skin is warm and dry.      Capillary Refill: Capillary refill takes less than 2 seconds.      Findings: No rash.   Neurological:      General: No focal deficit present.      Mental Status: He is alert and oriented to person, place, and time. Mental status is at baseline.      GCS: GCS eye subscore is 4. GCS verbal subscore is 5. GCS motor subscore is 6.      Cranial Nerves: No cranial nerve deficit.      Deep Tendon Reflexes: Reflexes are normal and symmetric.   Psychiatric:         Attention and Perception: Attention normal.         Mood and Affect: Mood is anxious. Affect is flat.         Speech: Speech is delayed.         Behavior: Behavior is slowed.         Thought Content: Thought content normal.         Judgment: Judgment normal.         Laboratory:  Recent Labs     05/06/24  0245 05/06/24  1430   WBC 11.4* 15.3*   RBC 4.98 4.92   HEMOGLOBIN 13.9* 13.9*   HEMATOCRIT 41.6* 41.2*   MCV 83.5 83.7   MCH 27.9 28.3   MCHC 33.4 33.7   RDW 40.3 40.8   PLATELETCT 194 226   MPV 9.5 9.6     Recent Labs     05/06/24  0245 05/06/24  1430   SODIUM 136 137   POTASSIUM 4.7 4.9   CHLORIDE 87* 87*   CO2 26 24   GLUCOSE 506* 556*   BUN  "33* 41*   CREATININE 0.81 1.06   CALCIUM 8.8 8.9     Recent Labs     05/06/24  0245 05/06/24  1430   ALTSGPT 11 12   ASTSGOT 9* 10*   ALKPHOSPHAT 107* 107*   TBILIRUBIN 0.5 0.4   LIPASE 5* 11   GLUCOSE 506* 556*         No results for input(s): \"NTPROBNP\" in the last 72 hours.      Recent Labs     05/06/24  1430   TROPONINT 38*       Imaging:  CT-ABDOMEN-PELVIS WITH   Final Result   Addendum (preliminary) 1 of 1   Addendum: There is stable left renal pelvocaliectasis.      Final      1.  Mild distal esophageal wall thickening, question esophagitis.   2.  Horseshoe kidney.   3.  2 left renal calculi.      DX-CHEST-PORTABLE (1 VIEW)   Final Result      Mildly increased interstitial markings are noted at the lung bases, left worse than right.          X-Ray:  I have personally reviewed the images and compared with prior images.  EKG:  I have personally reviewed the images and compared with prior images.    Assessment/Plan:  Justification for Admission Status  I anticipate this patient will require at least two midnights for appropriate medical management, necessitating inpatient admission because patient has been having nausea vomiting and diarrhea for the past few days.  Patient has uncontrolled diabetes mellitus type 2 with hyperglycemia and will require at this point stabilization of his blood sugars, electrolytes, fluid status and this will require at least 48 hours of inpatient management..    Patient will need a Telemetry bed on MEDICAL service .  The need is secondary to uncontrolled diabetes mellitus type 2.    * Type 2 diabetes mellitus with hyperosmolar nonketotic hyperglycemia (HCC)- (present on admission)  Assessment & Plan  Patient is uncontrolled diabetes mellitus type 2  He is noncompliant  He is out of his medications and has not refilled them  He was here in the emergency room yesterday but and went home with new diabetic supplies but never filled them  He comes back with a blood sugar of 556 today, " beta hydroxybutyric acid is greater than 8  Patient's ABG showed normal pH without significant acidosis, anion gap is elevated at 26  Discussed that with critical care at this point patient will be going to the medical floor with Lantus and insulin management per sliding scale  Fluid resuscitation  Monitor BMP every 6 hours  Not sure how we are going to get the patient to become compliant.    Elevated beta-hydroxybutyrate- (present on admission)  Assessment & Plan  Monitor beta-hydroxybutyrate cast daily    Leukocytosis- (present on admission)  Assessment & Plan  Secondary to stress response patient's white blood cell count is elevated  Monitor white blood cell after hydration and blood sugar management    Full code status- (present on admission)  Assessment & Plan  Discussed advanced directives with patient he wishes to be full CODE STATUS    Normochromic normocytic anemia- (present on admission)  Assessment & Plan  Monitor H&H if drops below 7 or 21 transfuse  Currently is 13.9/41.2    Dyslipidemia- (present on admission)  Assessment & Plan  Low-fat low-cholesterol diet  Statin  Fasting lipid panel    Noncompliance with medications- (present on admission)  Assessment & Plan  Patient will need outpatient follow-ups and may be home health as well  Patient will need discussions to make sure that he becomes compliant.        VTE prophylaxis: SCDs/TEDs

## 2024-05-07 NOTE — CARE PLAN
The patient is Stable - Low risk of patient condition declining or worsening    Shift Goals  Clinical Goals: Improved labs  Patient Goals: Pain control, rest  Family Goals: KATHERINE    Progress made toward(s) clinical / shift goals:    Problem: Pain - Standard  Goal: Alleviation of pain or a reduction in pain to the patient’s comfort goal  Outcome: Progressing  Note: PRN oxycodone utilized for pain control.     Problem: Knowledge Deficit - Standard  Goal: Patient and family/care givers will demonstrate understanding of plan of care, disease process/condition, diagnostic tests and medications  Outcome: Progressing  Note: Patient's knowledge of plan of care, diagnostics, and medications regularly assessed. RN answers patient questions appropriately, education provided. Patient verbalizes better understanding of their condition.         Patient is not progressing towards the following goals:

## 2024-05-07 NOTE — THERAPY
Occupational Therapy   Initial Evaluation     Patient Name: Christoph Taylor  Age:  58 y.o., Sex:  male  Medical Record #: [id number]  Today's Date: 5/7/2024     Precautions  Precautions: Fall Risk    Assessment  Patient is 58 y.o. male admitted with N/V/D; non-compliance with Type 2 Diabetes. Pt is A&Ox3, flat affect. Agreeable but encouragement needed for OOB activity. Lives alone with his dog. Limited by generalized weakness, impaired balance, impaired safety, impaired FMC (chronic due to R thumb limitations), decreased functional mobility impacting ADL performance. See below for CLOF. OT will follow while in house.           Plan  Occupational Therapy Initial Treatment Plan   Treatment Interventions: Self Care / Activities of Daily Living, Neuro Re-Education / Balance, Therapeutic Activity, Therapeutic Exercises  Treatment Frequency: 3 Times per Week  Duration: Until Therapy Goals Met    DC Equipment Recommendations: Unable to determine at this time  Discharge Recommendations: Recommend post-acute placement for additional occupational therapy services prior to discharge home (vs  depending upon progress.)     Subjective  Agreeable     Objective   05/07/24 1435   Prior Living Situation   Prior Services None   Housing / Facility 1 Story House   Steps Into Home 5   Steps In Home 0   Bathroom Set up Walk In Shower;Grab Bars;Shower Chair   Equipment Owned Front-Wheel Walker;Single Point Cane;Wheelchair;Tub / Shower Seat;Grab Bar(s) In Tub / Shower;Raised Toilet Seat Without Arms   Lives with - Patient's Self Care Capacity Alone and Able to Care For Self   Comments No family in town. AFO on LLE- since 2018. Pt in C last year for 5 wks for R thumb infection/antibiotics. Bob ashton is at home; stayed without assistance during snf stay.   Prior Level of ADL Function   Self Feeding Independent   Grooming / Hygiene Independent   Bathing Independent   Dressing Independent   Toileting Independent   Prior Level of IADL  Function   Medication Management Independent   Laundry Independent   Kitchen Mobility Independent   Finances Independent   Home Management Independent   Shopping Independent   Prior Level Of Mobility Independent With Device in Home   Driving / Transportation Driving Independent   Occupation (Pre-Hospital Vocational) Not Employed   Leisure Interests Unable To Determine At This Time   Precautions   Precautions Fall Risk   Vitals   O2 (LPM) 2   O2 Delivery Device Silicone Nasal Cannula   Pain 0 - 10 Group   Location Back   Therapist Pain Assessment Post Activity Pain Same as Prior to Activity;Nurse Notified   Cognition    Level of Consciousness Alert   Comments flat affect. Ox3. a bit impulsive. poor insight.   Passive ROM Upper Body   Passive ROM Upper Body WDL   Active ROM Upper Body   Active ROM Upper Body  WDL   Strength Upper Body   Upper Body Strength  WDL   Upper Body Muscle Tone   Upper Body Muscle Tone  WDL   Coordination Upper Body   Coordination X   Comments needs help with opening containers. R thumb with deformity - infection~ 1 y ago. r   Balance Assessment   Sitting Balance (Static) Good   Sitting Balance (Dynamic) Fair +   Standing Balance (Static) Fair   Standing Balance (Dynamic) Fair -   Weight Shift Sitting Good   Weight Shift Standing Fair   Bed Mobility    Supine to Sit Supervised   Sit to Supine Supervised   ADL Assessment   Eating Independent   Grooming Contact Guard Assist;Standing   Lower Body Dressing Moderate Assist   Toileting Standby Assist   How much help from another person does the patient currently need...   Putting on and taking off regular lower body clothing? 3   Bathing (including washing, rinsing, and drying)? 3   Toileting, which includes using a toilet, bedpan, or urinal? 3   Putting on and taking off regular upper body clothing? 4   Taking care of personal grooming such as brushing teeth? 3   Eating meals? 3   6 Clicks Daily Activity Score 19   Functional Mobility   Sit to  Stand Standby Assist   Bed, Chair, Wheelchair Transfer Contact Guard Assist   Toilet Transfers Contact Guard Assist   Transfer Method Stand Step   Comments FWW, cues for safety   Visual Perception   Visual Perception  Not Tested   Activity Tolerance   Sitting in Chair declines   Sitting Edge of Bed 13   Standing 2x1   Short Term Goals   Short Term Goal # 1 FB dressing with SBA within 5 days   Short Term Goal # 2 ADL transfers with Spv within 5 days   Short Term Goal # 3 Tolerate 2/3 meals UIC each day   Education Group   Education Provided Home Safety;Activities of Daily Living   Role of Occupational Therapist Patient Response Patient;Verbal Demonstration;Explanation;Acceptance   Home Safety Patient Response Patient;Acceptance;Explanation;Verbal Demonstration   ADL Patient Response Patient;Acceptance;Action Demonstration;Explanation   Occupational Therapy Initial Treatment Plan    Treatment Interventions Self Care / Activities of Daily Living;Neuro Re-Education / Balance;Therapeutic Activity;Therapeutic Exercises   Treatment Frequency 3 Times per Week   Duration Until Therapy Goals Met   Problem List   Problem List Decreased Active Daily Living Skills;Decreased Homemaking Skills;Decreased Functional Mobility;Decreased Activity Tolerance;Safety Awareness Deficits / Cognition;Impaired Postural Control / Balance;Impaired Coordination Right Upper Extremity   Anticipated Discharge Equipment and Recommendations   DC Equipment Recommendations Unable to determine at this time   Discharge Recommendations Recommend post-acute placement for additional occupational therapy services prior to discharge home  (vs  depending upon progress.)   Interdisciplinary Plan of Care Collaboration   IDT Collaboration with  Nursing;   Patient Position at End of Therapy In Bed;Bed Alarm On;Call Light within Reach;Phone within Reach;Tray Table within Reach   Collaboration Comments OT Elayne. SAVITA planning. Dog is home alone; reports  "having a dog door and \"left food and water dish out\"                 "

## 2024-05-07 NOTE — PROGRESS NOTES
Telemetry Shift Summary    Rhythm SR  HR Range 70s-90s  Ectopy rare PAC/PVC  Measurements 0.13/0.09/0.37        Normal Values  Rhythm SR  HR Range    Measurements 0.12-0.20 / 0.06-0.10  / 0.30-0.52

## 2024-05-07 NOTE — ED NOTES
Report called to Jaymie CASPER.  1 liter of LR (ordered as a bolus see MAR) still infusing upon transfer to floor.    Transferred via cardiac monitor and oxygen at 2 L NC.

## 2024-05-07 NOTE — ASSESSMENT & PLAN NOTE
Noncompliant with treatment    5/11:  FSBG's reviewed  Hyperglycemia has returned as he is eating more  Increased Lantus dose

## 2024-05-07 NOTE — DISCHARGE PLANNING
Case Management Discharge Planning    Admission Date: 5/6/2024  GMLOS: 2.1  ALOS: 1    6-Clicks ADL Score: 22  6-Clicks Mobility Score: 17    Anticipated Discharge Dispo: Discharge Disposition: Discharged to home/self care (01)  Discharge Address: [address] [place] [address] [address] [place] [address]    Action(s) Taken: DC Assessment Complete (See below)    @1143  Initial Assessment    LSW spoke to patient at bedside. Introduced self and role. Confirmed face sheet information. PCP is Dr. Darnell. Patient reports he lives alone in [place]. States his son, Nickolas, does not locally. Patient reports his relationship with his son is not the greatest. Patient reports no oxygen use at home. Reports he owns a cane, FWW, and wheelchair. LSW discussed whether cost of insulin is a barrier for him to manage his diabetes, patient said no. LSW discussed meals at home with patient. Patient reports he is able to move around. Patient is 58 and not home bound making him ineligible to meals on wheels. LSW to add resources to patient's AVS.     Care Transition Team Assessment    Information Source  Orientation Level: Oriented X4  Information Given By: Patient  Informant's Name: Christoph  Who is responsible for making decisions for patient? : Patient    Readmission Evaluation  Is this a readmission?: No    Elopement Risk  Legal Hold: No  Ambulatory or Self Mobile in Wheelchair: No-Not an Elopement Risk  Elopement Risk: Not at Risk for Elopement    Interdisciplinary Discharge Planning  Lives with - Patient's Self Care Capacity: Alone and Able to Care For Self  Patient or legal guardian wants to designate a caregiver: No  Support Systems: None  Housing / Facility: [address]  Durable Medical Equipment: Walker (wheelchair)    Discharge Preparedness  What is your plan after discharge?: Home with help  Prior Functional Level: Independent with Activities of Daily Living, Independent with Medication Management, Uses Walker, Uses Wheelchair, Uses Cane  Difficulity with ADLs:  Walking    Functional Assesment  Prior Functional Level: Independent with Activities of Daily Living, Independent with Medication Management, Uses Walker, Uses Wheelchair, Uses Cane    Finances  Financial Barriers to Discharge: No  Prescription Coverage: Yes    Advance Directive  Advance Directive?: None    Domestic Abuse  Have you ever been the victim of abuse or violence?: No  Physical Abuse or Sexual Abuse: No  Verbal Abuse or Emotional Abuse: No  Possible Abuse/Neglect Reported to:: Not Applicable    Discharge Risks or Barriers  Discharge risks or barriers?: Non-adherence to medication or treatment, Lives alone, no community support  Patient risk factors: Lack of outside supports, Lives alone and no community support    Anticipated Discharge Information  Discharge Disposition: Discharged to home/self care (01)  Discharge Address: [address] [place] [address] [address] [id number]

## 2024-05-08 PROBLEM — G47.00 INSOMNIA: Status: ACTIVE | Noted: 2024-05-08

## 2024-05-08 LAB
ALBUMIN SERPL BCP-MCNC: 2.8 G/DL (ref 3.2–4.9)
ALBUMIN/GLOB SERPL: 1 G/DL
ALP SERPL-CCNC: 74 U/L (ref 30–99)
ALT SERPL-CCNC: 8 U/L (ref 2–50)
ANION GAP SERPL CALC-SCNC: 8 MMOL/L (ref 7–16)
AST SERPL-CCNC: 9 U/L (ref 12–45)
BILIRUB SERPL-MCNC: 0.2 MG/DL (ref 0.1–1.5)
BUN SERPL-MCNC: 14 MG/DL (ref 8–22)
CALCIUM ALBUM COR SERPL-MCNC: 8.4 MG/DL (ref 8.5–10.5)
CALCIUM SERPL-MCNC: 7.4 MG/DL (ref 8.4–10.2)
CHLORIDE SERPL-SCNC: 95 MMOL/L (ref 96–112)
CO2 SERPL-SCNC: 32 MMOL/L (ref 20–33)
CREAT SERPL-MCNC: 0.5 MG/DL (ref 0.5–1.4)
GFR SERPLBLD CREATININE-BSD FMLA CKD-EPI: 117 ML/MIN/1.73 M 2
GLOBULIN SER CALC-MCNC: 2.7 G/DL (ref 1.9–3.5)
GLUCOSE BLD STRIP.AUTO-MCNC: 115 MG/DL (ref 65–99)
GLUCOSE BLD STRIP.AUTO-MCNC: 122 MG/DL (ref 65–99)
GLUCOSE BLD STRIP.AUTO-MCNC: 129 MG/DL (ref 65–99)
GLUCOSE BLD STRIP.AUTO-MCNC: 135 MG/DL (ref 65–99)
GLUCOSE SERPL-MCNC: 160 MG/DL (ref 65–99)
LIPASE SERPL-CCNC: 9 U/L (ref 11–82)
POTASSIUM SERPL-SCNC: 3.5 MMOL/L (ref 3.6–5.5)
PROT SERPL-MCNC: 5.5 G/DL (ref 6–8.2)
SODIUM SERPL-SCNC: 135 MMOL/L (ref 135–145)

## 2024-05-08 PROCEDURE — 99232 SBSQ HOSP IP/OBS MODERATE 35: CPT | Performed by: INTERNAL MEDICINE

## 2024-05-08 RX ORDER — PANTOPRAZOLE SODIUM 40 MG/10ML
40 INJECTION, POWDER, LYOPHILIZED, FOR SOLUTION INTRAVENOUS 2 TIMES DAILY
Status: DISCONTINUED | OUTPATIENT
Start: 2024-05-08 | End: 2024-05-09

## 2024-05-08 RX ORDER — GABAPENTIN 100 MG/1
100 CAPSULE ORAL 3 TIMES DAILY
Status: DISCONTINUED | OUTPATIENT
Start: 2024-05-08 | End: 2024-05-13 | Stop reason: HOSPADM

## 2024-05-08 RX ORDER — ACETAMINOPHEN 325 MG/1
650 TABLET ORAL EVERY 4 HOURS PRN
Status: DISCONTINUED | OUTPATIENT
Start: 2024-05-08 | End: 2024-05-09

## 2024-05-08 RX ORDER — SODIUM CHLORIDE 9 MG/ML
INJECTION, SOLUTION INTRAVENOUS CONTINUOUS
Status: DISCONTINUED | OUTPATIENT
Start: 2024-05-08 | End: 2024-05-09

## 2024-05-08 RX ORDER — TRAZODONE HYDROCHLORIDE 50 MG/1
50 TABLET ORAL
Status: DISCONTINUED | OUTPATIENT
Start: 2024-05-08 | End: 2024-05-13 | Stop reason: HOSPADM

## 2024-05-08 RX ADMIN — GABAPENTIN 100 MG: 100 CAPSULE ORAL at 11:53

## 2024-05-08 RX ADMIN — SENNOSIDES AND DOCUSATE SODIUM 2 TABLET: 50; 8.6 TABLET ORAL at 17:17

## 2024-05-08 RX ADMIN — SUCRALFATE 1 G: 1 TABLET ORAL at 06:00

## 2024-05-08 RX ADMIN — KETOROLAC TROMETHAMINE 15 MG: 15 INJECTION, SOLUTION INTRAMUSCULAR; INTRAVENOUS at 06:14

## 2024-05-08 RX ADMIN — SODIUM CHLORIDE: 9 INJECTION, SOLUTION INTRAVENOUS at 17:17

## 2024-05-08 RX ADMIN — KETOROLAC TROMETHAMINE 15 MG: 15 INJECTION, SOLUTION INTRAMUSCULAR; INTRAVENOUS at 12:38

## 2024-05-08 RX ADMIN — WHITE PETROLATUM: 1.75 OINTMENT TOPICAL at 17:18

## 2024-05-08 RX ADMIN — PANTOPRAZOLE SODIUM 40 MG: 40 INJECTION, POWDER, LYOPHILIZED, FOR SOLUTION INTRAVENOUS at 17:18

## 2024-05-08 RX ADMIN — ACETAMINOPHEN 650 MG: 325 TABLET ORAL at 15:08

## 2024-05-08 RX ADMIN — HEPARIN SODIUM 5000 UNITS: 5000 INJECTION, SOLUTION INTRAVENOUS; SUBCUTANEOUS at 05:53

## 2024-05-08 RX ADMIN — GABAPENTIN 100 MG: 100 CAPSULE ORAL at 17:17

## 2024-05-08 RX ADMIN — SUCRALFATE 1 G: 1 TABLET ORAL at 17:17

## 2024-05-08 RX ADMIN — PANTOPRAZOLE SODIUM 40 MG: 40 INJECTION, POWDER, LYOPHILIZED, FOR SOLUTION INTRAVENOUS at 05:48

## 2024-05-08 RX ADMIN — TRAZODONE HYDROCHLORIDE 50 MG: 50 TABLET ORAL at 20:21

## 2024-05-08 RX ADMIN — SUCRALFATE 1 G: 1 TABLET ORAL at 11:53

## 2024-05-08 RX ADMIN — WHITE PETROLATUM: 1.75 OINTMENT TOPICAL at 05:48

## 2024-05-08 RX ADMIN — FAMOTIDINE 20 MG: 20 TABLET ORAL at 06:00

## 2024-05-08 RX ADMIN — LIDOCAINE HYDROCHLORIDE 30 ML: 20 SOLUTION ORAL; TOPICAL at 08:06

## 2024-05-08 RX ADMIN — KETOROLAC TROMETHAMINE 15 MG: 15 INJECTION, SOLUTION INTRAMUSCULAR; INTRAVENOUS at 19:32

## 2024-05-08 ASSESSMENT — ENCOUNTER SYMPTOMS
FEVER: 0
ABDOMINAL PAIN: 1
CHILLS: 0
WEAKNESS: 1
NAUSEA: 1
VOMITING: 0
BACK PAIN: 1

## 2024-05-08 ASSESSMENT — FIBROSIS 4 INDEX: FIB4 SCORE: 0.82

## 2024-05-08 ASSESSMENT — PAIN DESCRIPTION - PAIN TYPE
TYPE: ACUTE PAIN

## 2024-05-08 NOTE — WOUND TEAM
[facility]  Inpatient Services  Wound and Skin Care Brief Evaluation    Admission Date: 5/6/2024     Last order of IP CONSULT TO WOUND CARE was found on 5/6/2024 from Hospital Encounter on 5/6/2024     HPI, PMH, SH: Reviewed    Chief Complaint   Patient presents with    Abdominal Pain     LUQ x3 days with N/V/D. Denies noting any bloody emesis or stools. Denies known fevers.     Diagnosis: Type 2 diabetes mellitus with hyperosmolar nonketotic hyperglycemia (HCC) [E11.00]    Unit where seen by Wound Team: 3313/01     Wound consult placed regarding Hands, feet and legs. Chart and images reviewed. This discussed with bedside RN Jaymie. This clinician in to assess patient. Patient pleasant and agreeable. Assessed hands, legs, feet, heels and sacrococcygeal area. Hands have dry crust between thumb and 1st finger. Per pt he uses a heidi to the callus/crust and then uses lotion.  Non-selectively debrided with Moist warm washcloth. He's has several small scabs to BLE. L foot has a brace in place and it was removed, there are a couple of dry scabs to his foot.     No pressure injuries or advanced wound care needs identified. Wound consult completed. No further follow up unless indicated and consulted.          PREVENTATIVE INTERVENTIONS:    Q shift Oscar - performed per nursing policy  Q shift pressure point assessments - performed per nursing policy    Surface/Positioning  Standard/trauma mattress - Currently in Place  Reposition q 2 hours - Currently in Place  pt performs      Respiratory  Silicone O2 tubing - Currently in Place  Gray Foam Ear protectors - Currently in Place    Containment/Moisture Prevention    bathroom - Currently in Place    Mobilization      Ambulate

## 2024-05-08 NOTE — CARE PLAN
The patient is Stable - Low risk of patient condition declining or worsening    Shift Goals  Clinical Goals: monitor FSBG  Patient Goals: rest and comfort  Family Goals: KATHERINE    Progress made toward(s) clinical / shift goals:    Problem: Pain - Standard  Goal: Alleviation of pain or a reduction in pain to the patient’s comfort goal  Outcome: Progressing  Note: Pt reported abdominal pain. RN provided education on reasoning for no narcotics or opioids, patient demonstrates understanding and would like a heat pack instead. Heat pack provided by RN.      Problem: Knowledge Deficit - Standard  Goal: Patient and family/care givers will demonstrate understanding of plan of care, disease process/condition, diagnostic tests and medications  Outcome: Progressing  Note: Pt updated on POC, agreeable. Checking FSBG. All questions and concerns addressed at this time.      Problem: Fall Risk  Goal: Patient will remain free from falls  Outcome: Progressing

## 2024-05-08 NOTE — DIETARY
Nutrition services: Day 2 of admit.  Christoph Taylor is a 58 y.o. male with admitting DX of Type 2 diabetes mellitus with hyperosmolar nonketotic hyperglycemia.  Consult received for Malnutrition Screening Tool score of 2 for unplanned weight loss of 2-13 lb x 3 months and poor appetite.    Spoke with pt at bedside. He reports stomach pain and poor appetite. Observed pt ate <50% of his lunch. Pt states he lost about 5 lb recently.    Assessment:  Height: 182.9 cm (6')  Weight: 76.1 kg (167 lb 12.3 oz)  Body mass index is 22.75 kg/m²., BMI classification: Normal  Diet/Intake: Consistent CHO    Evaluation:   Pt with epigastric pain. Gastric emptying study planned for possible gastroparesis.  Labs include A1C 13.2 (L). Per MD note, pt with noncompliance with medications.  Medications include Lantus, SSI.  Per chart review, weight has been variable ranging up and down between 70 and 81 kg since January. Unable to determine weight loss.  No significant fat or muscle wasting.    Malnutrition Risk: Unable to meet 2 ASPEN criteria.    Recommendations/Plan:   Encourage intake of meals >50%.  Document intake of all meals as % taken in ADL's to provide interdisciplinary communication across all shifts.   Monitor weight.  Nutrition rep will continue to see patient for ongoing meal and snack preferences.     RD following

## 2024-05-08 NOTE — CARE PLAN
The patient is Stable - Low risk of patient condition declining or worsening    Shift Goals  Clinical Goals: monitor FSBG  Patient Goals: comfort, pain management  Family Goals: KATHERINE    Progress made toward(s) clinical / shift goals:      Patient is not progressing towards the following goals:      Problem: Pain - Standard  Goal: Alleviation of pain or a reduction in pain to the patient’s comfort goal  Outcome: Not Progressing     Problem: Knowledge Deficit - Standard  Goal: Patient and family/care givers will demonstrate understanding of plan of care, disease process/condition, diagnostic tests and medications  Outcome: Progressing

## 2024-05-08 NOTE — PROGRESS NOTES
Telemetry Shift Summary     Rhythm: SR  Rate: 73-93  Measurements: .16/.08/.32  Ectopy (reported by Monitor Tech): f-o PVC, r PAC     Normal Values  Rhythm: Sinus  HR:   Measurements: 0.12-0.20/0.06-0.10/0.30-0.52

## 2024-05-08 NOTE — PROGRESS NOTES
Hospital Medicine Daily Progress Note    Date of Service  5/8/2024    Chief Complaint  Christoph Taylor is a 58 y.o. male admitted 5/6/2024 with poorly controlled type 2 diabetes mellitus secondary to medical nonadherence of an unknown reason.  He presented to the hospital of [facility] which is now improving.  No clear evidence of concurrent infection.    Hospital Course  See above    Interval Problem Update  5/7  Sugars are slowly improving, complains of constant epigastric pain and nausea that has been there for months. He thinks he might have had an egd in the past.     5/8  Patient is writhing in pain and yelling out in the hallway this morning.  He is refusing Toradol and Tylenol and wants opiates.  He tells me he takes chronic opiates for his back pain and stomach pain but cannot tell me why he has stomach pain.  He claims only has stomach pain this morning.  I explained to him in detail by withholding the opiates to see if he has gastroparesis.  There is no other issues overnight.    I have discussed this patient's plan of care and discharge plan at IDT rounds today with Case Management, Nursing, Nursing leadership, and other members of the IDT team.    Consultants/Specialty  none    Code Status  Full Code    Disposition  The patient is not medically cleared for discharge to home or a post-acute facility.  Anticipate discharge to: home with close outpatient follow-up    I have placed the appropriate orders for post-discharge needs.    Review of Systems  Review of Systems   Constitutional:  Negative for chills and fever.   Gastrointestinal:  Positive for abdominal pain (worse today) and nausea. Negative for vomiting.   Musculoskeletal:  Positive for back pain.   Neurological:  Positive for weakness.        Physical Exam  Temp:  [36.4 °C (97.5 °F)-37.2 °C (99 °F)] 36.4 °C (97.5 °F)  Pulse:  [67-84] 83  Resp:  [16] 16  BP: (114-151)/(65-86) 131/67  SpO2:  [94 %-96 %] 94 %    Physical Exam  Vitals and nursing note  reviewed.   Constitutional:       General: He is not in acute distress.     Appearance: He is well-developed.      Comments: Flat affect, writhing in bed, mildly screaming   HENT:      Head: Normocephalic and atraumatic.      Mouth/Throat:      Pharynx: No oropharyngeal exudate.   Eyes:      General: No scleral icterus.     Pupils: Pupils are equal, round, and reactive to light.   Neck:      Thyroid: No thyromegaly.   Cardiovascular:      Rate and Rhythm: Normal rate and regular rhythm.      Heart sounds: Normal heart sounds. No murmur heard.  Pulmonary:      Effort: Pulmonary effort is normal. No respiratory distress.      Breath sounds: Normal breath sounds. No wheezing.   Abdominal:      General: Bowel sounds are normal. There is no distension.      Palpations: Abdomen is soft.      Tenderness: There is abdominal tenderness (mild upon deep palpation in the epigastric area). There is no guarding or rebound.   Musculoskeletal:         General: No tenderness. Normal range of motion.      Cervical back: Normal range of motion and neck supple.      Right lower leg: No edema.      Left lower leg: No edema.      Comments: Brace on the LLE  No back pain/ttp could be elicited today   Skin:     General: Skin is warm and dry.      Findings: No rash.   Neurological:      Mental Status: He is alert and oriented to person, place, and time.      Cranial Nerves: No cranial nerve deficit.         Fluids    Intake/Output Summary (Last 24 hours) at 5/8/2024 1105  Last data filed at 5/7/2024 1155  Gross per 24 hour   Intake 312.4 ml   Output --   Net 312.4 ml       Laboratory  Recent Labs     05/06/24  0245 05/06/24  1430 05/07/24  0041   WBC 11.4* 15.3* 12.2*   RBC 4.98 4.92 3.99*   HEMOGLOBIN 13.9* 13.9* 11.2*   HEMATOCRIT 41.6* 41.2* 34.3*   MCV 83.5 83.7 86.0   MCH 27.9 28.3 28.1   MCHC 33.4 33.7 32.7   RDW 40.3 40.8 41.5   PLATELETCT 194 226 228   MPV 9.5 9.6 9.7     Recent Labs     05/07/24  0041 05/07/24  0625 05/08/24  0052    SODIUM 135 137 135   POTASSIUM 4.5 4.3 3.5*   CHLORIDE 97 97 95*   CO2 30 27 32   GLUCOSE 357* 315* 160*   BUN 40* 34* 14   CREATININE 0.94 0.79 0.50   CALCIUM 7.6* 7.7* 7.4*                   Imaging  CT-ABDOMEN-PELVIS WITH   Final Result   Addendum (preliminary) 1 of 1   Addendum: There is stable left renal pelvocaliectasis.      Final      1.  Mild distal esophageal wall thickening, question esophagitis.   2.  Horseshoe kidney.   3.  2 left renal calculi.      DX-CHEST-PORTABLE (1 VIEW)   Final Result      Mildly increased interstitial markings are noted at the lung bases, left worse than right.      NM-GASTRIC EMPTYING    (Results Pending)        Assessment/Plan  * Type 2 diabetes mellitus with hyperosmolar nonketotic hyperglycemia (HCC)- (present on admission)  Assessment & Plan  Patient is uncontrolled diabetes mellitus type 2  He openly endorses nonadherence and cannot give me a reason why.  Case management social work consult  Blood sugars with considerable improvement on 5/8  Continue Lantus to 20 units twice daily  Insulin sliding scale and check A1c  I personally reviewed the blood sugars on May 8    Insomnia- (present on admission)  Assessment & Plan  Start trazodone 50 mg qhs    Epigastric pain- (present on admission)  Assessment & Plan  Unclear etiology, CTAP suggests some element of esophagitis  IV protonix 40 mg increased to BID, carafate, gi cocktail prn  Lidocaine patch  Last EGD in 3/2023 with LA Grade D EE to mid-esophagus, but no other issues  NM gastric emptying study given concurrent nausea and poorly controlled dm  HOLD ALL OPIATES for now  If above NM study is negative, and pain persists, would get a GI consult and possible repeat EGD    Full code status- (present on admission)  Assessment & Plan  Discussed advanced directives with patient he wishes to be full CODE STATUS    Elevated beta-hydroxybutyrate- (present on admission)  Assessment & Plan  resolved    Normochromic normocytic  anemia- (present on admission)  Assessment & Plan  H/H slowly drifting down, but remains near his baseline, I personally reviewed the cbc on 5/8    Dyslipidemia- (present on admission)  Assessment & Plan  Low-fat low-cholesterol diet  Statin  Fasting lipid panel    Noncompliance with medications- (present on admission)  Assessment & Plan  Patient will need outpatient follow-ups and may be home health as well  Patient will need discussions to make sure that he becomes compliant, he has no answer    Leukocytosis- (present on admission)  Assessment & Plan  Improving, no e/o concurrent infection, defer abx's, trend wbc/fever curve         VTE prophylaxis:   SCDs/TEDs      I have performed a physical exam and reviewed and updated ROS and Plan today (5/8/2024). In review of yesterday's note (5/7/2024), there are no changes except as documented above.

## 2024-05-08 NOTE — PROGRESS NOTES
Telemetry summary    Rhythm: SR  Rate: 81-83  Ectopy: rPAC, rPVC  Measurements: 0.16/0.08/0.32    Normal Values  Rhythm: SR  HR Range:   Measurement: 0.12-0.2/0.06-0.10/0.30-0.52

## 2024-05-09 ENCOUNTER — APPOINTMENT (OUTPATIENT)
Dept: RADIOLOGY | Facility: MEDICAL CENTER | Age: 59
End: 2024-05-09
Attending: INTERNAL MEDICINE
Payer: COMMERCIAL

## 2024-05-09 LAB
ANION GAP SERPL CALC-SCNC: 7 MMOL/L (ref 7–16)
BASOPHILS # BLD AUTO: 0.1 % (ref 0–1.8)
BASOPHILS # BLD: 0.01 K/UL (ref 0–0.12)
BUN SERPL-MCNC: 14 MG/DL (ref 8–22)
CALCIUM SERPL-MCNC: 7.7 MG/DL (ref 8.4–10.2)
CHLORIDE SERPL-SCNC: 96 MMOL/L (ref 96–112)
CO2 SERPL-SCNC: 33 MMOL/L (ref 20–33)
CREAT SERPL-MCNC: 0.67 MG/DL (ref 0.5–1.4)
EKG IMPRESSION: NORMAL
EOSINOPHIL # BLD AUTO: 0.14 K/UL (ref 0–0.51)
EOSINOPHIL NFR BLD: 2.1 % (ref 0–6.9)
ERYTHROCYTE [DISTWIDTH] IN BLOOD BY AUTOMATED COUNT: 38.8 FL (ref 35.9–50)
GFR SERPLBLD CREATININE-BSD FMLA CKD-EPI: 107 ML/MIN/1.73 M 2
GLUCOSE BLD STRIP.AUTO-MCNC: 103 MG/DL (ref 65–99)
GLUCOSE BLD STRIP.AUTO-MCNC: 131 MG/DL (ref 65–99)
GLUCOSE BLD STRIP.AUTO-MCNC: 135 MG/DL (ref 65–99)
GLUCOSE BLD STRIP.AUTO-MCNC: 84 MG/DL (ref 65–99)
GLUCOSE SERPL-MCNC: 169 MG/DL (ref 65–99)
HCT VFR BLD AUTO: 33.2 % (ref 42–52)
HGB BLD-MCNC: 11.2 G/DL (ref 14–18)
IMM GRANULOCYTES # BLD AUTO: 0.02 K/UL (ref 0–0.11)
IMM GRANULOCYTES NFR BLD AUTO: 0.3 % (ref 0–0.9)
LYMPHOCYTES # BLD AUTO: 1.23 K/UL (ref 1–4.8)
LYMPHOCYTES NFR BLD: 18.3 % (ref 22–41)
MAGNESIUM SERPL-MCNC: 1.8 MG/DL (ref 1.5–2.5)
MCH RBC QN AUTO: 28.1 PG (ref 27–33)
MCHC RBC AUTO-ENTMCNC: 33.7 G/DL (ref 32.3–36.5)
MCV RBC AUTO: 83.2 FL (ref 81.4–97.8)
MONOCYTES # BLD AUTO: 0.54 K/UL (ref 0–0.85)
MONOCYTES NFR BLD AUTO: 8 % (ref 0–13.4)
NEUTROPHILS # BLD AUTO: 4.77 K/UL (ref 1.82–7.42)
NEUTROPHILS NFR BLD: 71.2 % (ref 44–72)
NRBC # BLD AUTO: 0 K/UL
NRBC BLD-RTO: 0 /100 WBC (ref 0–0.2)
PLATELET # BLD AUTO: 165 K/UL (ref 164–446)
PMV BLD AUTO: 9.2 FL (ref 9–12.9)
POTASSIUM SERPL-SCNC: 3.4 MMOL/L (ref 3.6–5.5)
RBC # BLD AUTO: 3.99 M/UL (ref 4.7–6.1)
SODIUM SERPL-SCNC: 136 MMOL/L (ref 135–145)
WBC # BLD AUTO: 6.7 K/UL (ref 4.8–10.8)

## 2024-05-09 PROCEDURE — 93010 ELECTROCARDIOGRAM REPORT: CPT | Performed by: INTERNAL MEDICINE

## 2024-05-09 PROCEDURE — 99232 SBSQ HOSP IP/OBS MODERATE 35: CPT | Performed by: INTERNAL MEDICINE

## 2024-05-09 RX ORDER — CELECOXIB 200 MG/1
200 CAPSULE ORAL 2 TIMES DAILY PRN
Status: DISCONTINUED | OUTPATIENT
Start: 2024-05-14 | End: 2024-05-13 | Stop reason: HOSPADM

## 2024-05-09 RX ORDER — METOCLOPRAMIDE HYDROCHLORIDE 5 MG/ML
10 INJECTION INTRAMUSCULAR; INTRAVENOUS EVERY 6 HOURS
Status: DISCONTINUED | OUTPATIENT
Start: 2024-05-09 | End: 2024-05-10

## 2024-05-09 RX ORDER — CELECOXIB 200 MG/1
200 CAPSULE ORAL 2 TIMES DAILY
Status: DISCONTINUED | OUTPATIENT
Start: 2024-05-09 | End: 2024-05-13 | Stop reason: HOSPADM

## 2024-05-09 RX ORDER — OXYCODONE HYDROCHLORIDE 10 MG/1
10 TABLET ORAL
Status: DISCONTINUED | OUTPATIENT
Start: 2024-05-09 | End: 2024-05-13 | Stop reason: HOSPADM

## 2024-05-09 RX ORDER — ACETAMINOPHEN 500 MG
1000 TABLET ORAL EVERY 6 HOURS
Status: DISCONTINUED | OUTPATIENT
Start: 2024-05-09 | End: 2024-05-13 | Stop reason: HOSPADM

## 2024-05-09 RX ORDER — HYDROMORPHONE HYDROCHLORIDE 1 MG/ML
0.5 INJECTION, SOLUTION INTRAMUSCULAR; INTRAVENOUS; SUBCUTANEOUS
Status: DISCONTINUED | OUTPATIENT
Start: 2024-05-09 | End: 2024-05-13 | Stop reason: HOSPADM

## 2024-05-09 RX ORDER — OMEPRAZOLE 20 MG/1
20 CAPSULE, DELAYED RELEASE ORAL 2 TIMES DAILY
Status: DISCONTINUED | OUTPATIENT
Start: 2024-05-09 | End: 2024-05-13 | Stop reason: HOSPADM

## 2024-05-09 RX ORDER — SODIUM CHLORIDE 9 MG/ML
1000 INJECTION, SOLUTION INTRAVENOUS ONCE
Status: COMPLETED | OUTPATIENT
Start: 2024-05-09 | End: 2024-05-09

## 2024-05-09 RX ORDER — OXYCODONE HYDROCHLORIDE 5 MG/1
5 TABLET ORAL
Status: DISCONTINUED | OUTPATIENT
Start: 2024-05-09 | End: 2024-05-13 | Stop reason: HOSPADM

## 2024-05-09 RX ORDER — ACETAMINOPHEN 500 MG
1000 TABLET ORAL EVERY 6 HOURS PRN
Status: DISCONTINUED | OUTPATIENT
Start: 2024-05-14 | End: 2024-05-13 | Stop reason: HOSPADM

## 2024-05-09 RX ADMIN — HEPARIN SODIUM 5000 UNITS: 5000 INJECTION, SOLUTION INTRAVENOUS; SUBCUTANEOUS at 15:07

## 2024-05-09 RX ADMIN — GABAPENTIN 100 MG: 100 CAPSULE ORAL at 05:55

## 2024-05-09 RX ADMIN — OXYCODONE HYDROCHLORIDE 10 MG: 10 TABLET ORAL at 12:58

## 2024-05-09 RX ADMIN — GABAPENTIN 100 MG: 100 CAPSULE ORAL at 17:40

## 2024-05-09 RX ADMIN — SUCRALFATE 1 G: 1 TABLET ORAL at 17:40

## 2024-05-09 RX ADMIN — SODIUM CHLORIDE 1000 ML: 9 INJECTION, SOLUTION INTRAVENOUS at 19:35

## 2024-05-09 RX ADMIN — OMEPRAZOLE 20 MG: 20 CAPSULE, DELAYED RELEASE ORAL at 17:40

## 2024-05-09 RX ADMIN — METOCLOPRAMIDE 10 MG: 5 INJECTION, SOLUTION INTRAMUSCULAR; INTRAVENOUS at 17:41

## 2024-05-09 RX ADMIN — HEPARIN SODIUM 5000 UNITS: 5000 INJECTION, SOLUTION INTRAVENOUS; SUBCUTANEOUS at 21:15

## 2024-05-09 RX ADMIN — HYDROMORPHONE HYDROCHLORIDE 0.5 MG: 1 INJECTION, SOLUTION INTRAMUSCULAR; INTRAVENOUS; SUBCUTANEOUS at 15:35

## 2024-05-09 RX ADMIN — ACETAMINOPHEN 1000 MG: 500 TABLET, FILM COATED ORAL at 17:41

## 2024-05-09 RX ADMIN — WHITE PETROLATUM: 1.75 OINTMENT TOPICAL at 05:55

## 2024-05-09 RX ADMIN — SENNOSIDES AND DOCUSATE SODIUM 2 TABLET: 50; 8.6 TABLET ORAL at 17:42

## 2024-05-09 RX ADMIN — SUCRALFATE 1 G: 1 TABLET ORAL at 11:20

## 2024-05-09 RX ADMIN — KETOROLAC TROMETHAMINE 15 MG: 15 INJECTION, SOLUTION INTRAMUSCULAR; INTRAVENOUS at 11:19

## 2024-05-09 RX ADMIN — ACETAMINOPHEN 1000 MG: 500 TABLET, FILM COATED ORAL at 12:58

## 2024-05-09 RX ADMIN — ASPIRIN 81 MG: 81 TABLET, COATED ORAL at 17:40

## 2024-05-09 RX ADMIN — ACETAMINOPHEN 1000 MG: 500 TABLET, FILM COATED ORAL at 23:07

## 2024-05-09 RX ADMIN — METOCLOPRAMIDE 10 MG: 5 INJECTION, SOLUTION INTRAMUSCULAR; INTRAVENOUS at 12:58

## 2024-05-09 RX ADMIN — METOCLOPRAMIDE 10 MG: 5 INJECTION, SOLUTION INTRAMUSCULAR; INTRAVENOUS at 23:07

## 2024-05-09 RX ADMIN — WHITE PETROLATUM: 1.75 OINTMENT TOPICAL at 17:40

## 2024-05-09 RX ADMIN — KETOROLAC TROMETHAMINE 15 MG: 15 INJECTION, SOLUTION INTRAMUSCULAR; INTRAVENOUS at 01:29

## 2024-05-09 RX ADMIN — GABAPENTIN 100 MG: 100 CAPSULE ORAL at 11:20

## 2024-05-09 RX ADMIN — CELECOXIB 200 MG: 200 CAPSULE ORAL at 17:40

## 2024-05-09 RX ADMIN — SODIUM CHLORIDE: 9 INJECTION, SOLUTION INTRAVENOUS at 05:46

## 2024-05-09 ASSESSMENT — ENCOUNTER SYMPTOMS
CHILLS: 0
FEVER: 0
VOMITING: 0
NAUSEA: 1
ABDOMINAL PAIN: 1
WEAKNESS: 1
BACK PAIN: 1

## 2024-05-09 ASSESSMENT — PAIN DESCRIPTION - PAIN TYPE
TYPE: ACUTE PAIN
TYPE: ACUTE PAIN

## 2024-05-09 NOTE — PROGRESS NOTES
Hospital Medicine Daily Progress Note    Date of Service  5/9/2024    Chief Complaint  Christoph Taylor is a 58 y.o. male admitted 5/6/2024 with poorly controlled type 2 diabetes mellitus secondary to medical nonadherence of an unknown reason.  He presented to the hospital of [facility] which is now improving.  No clear evidence of concurrent infection.    Hospital Course  See above    Interval Problem Update  5/7  Sugars are slowly improving, complains of constant epigastric pain and nausea that has been there for months. He thinks he might have had an egd in the past.     5/8  Patient is writhing in pain and yelling out in the hallway this morning.  He is refusing Toradol and Tylenol and wants opiates.  He tells me he takes chronic opiates for his back pain and stomach pain but cannot tell me why he has stomach pain.  He claims only has stomach pain this morning.  I explained to him in detail by withholding the opiates to see if he has gastroparesis.  There is no other issues overnight.    5/9  NM gastric emptying study shows severe gastroparesis and GERD. After study, he immediately wanted opiates. He has ongoing epigastric pain. He wants food. No other acute events noted overnight. I personally reviewed the EKG which shows NSR, HR 77, no e/o acute ST segment changes.    I have discussed this patient's plan of care and discharge plan at IDT rounds today with Case Management, Nursing, Nursing leadership, and other members of the IDT team.    Consultants/Specialty  none    Code Status  Full Code    Disposition  The patient is not medically cleared for discharge to home or a post-acute facility.  Anticipate discharge to: home with close outpatient follow-up    I have placed the appropriate orders for post-discharge needs.    Review of Systems  Review of Systems   Constitutional:  Negative for chills and fever.   Gastrointestinal:  Positive for abdominal pain (quite severe) and nausea. Negative for vomiting.    Musculoskeletal:  Positive for back pain.   Neurological:  Positive for weakness.        Physical Exam  Temp:  [36.4 °C (97.5 °F)-36.8 °C (98.3 °F)] 36.4 °C (97.6 °F)  Pulse:  [73-84] 84  Resp:  [16-18] 18  BP: (114-140)/(66-91) 140/72  SpO2:  [95 %-99 %] 96 %    Physical Exam  Vitals and nursing note reviewed.   Constitutional:       General: He is not in acute distress.     Appearance: He is well-developed.      Comments: Flat affect, writhing in bed, mildly screaming  No obvious change noted today   HENT:      Head: Normocephalic and atraumatic.      Mouth/Throat:      Pharynx: No oropharyngeal exudate.   Eyes:      General: No scleral icterus.     Pupils: Pupils are equal, round, and reactive to light.   Neck:      Thyroid: No thyromegaly.   Cardiovascular:      Rate and Rhythm: Normal rate and regular rhythm.      Heart sounds: Normal heart sounds. No murmur heard.  Pulmonary:      Effort: Pulmonary effort is normal. No respiratory distress.      Breath sounds: Normal breath sounds. No wheezing.   Abdominal:      General: Bowel sounds are normal. There is no distension.      Palpations: Abdomen is soft.      Tenderness: There is abdominal tenderness (mild upon deep palpation in the epigastric area). There is no guarding or rebound.   Musculoskeletal:         General: No tenderness. Normal range of motion.      Cervical back: Normal range of motion and neck supple.      Right lower leg: No edema.      Left lower leg: No edema.      Comments: Brace on the LLE  No back pain/ttp could be elicited today   Skin:     General: Skin is warm and dry.      Findings: No rash.   Neurological:      Mental Status: He is alert and oriented to person, place, and time.      Cranial Nerves: No cranial nerve deficit.         Fluids  No intake or output data in the 24 hours ending 05/09/24 1323      Laboratory  Recent Labs     05/06/24  1430 05/07/24  0041 05/09/24  0211   WBC 15.3* 12.2* 6.7   RBC 4.92 3.99* 3.99*   HEMOGLOBIN  13.9* 11.2* 11.2*   HEMATOCRIT 41.2* 34.3* 33.2*   MCV 83.7 86.0 83.2   MCH 28.3 28.1 28.1   MCHC 33.7 32.7 33.7   RDW 40.8 41.5 38.8   PLATELETCT 226 228 165   MPV 9.6 9.7 9.2     Recent Labs     05/07/24  0625 05/08/24  0052 05/09/24  0211   SODIUM 137 135 136   POTASSIUM 4.3 3.5* 3.4*   CHLORIDE 97 95* 96   CO2 27 32 33   GLUCOSE 315* 160* 169*   BUN 34* 14 14   CREATININE 0.79 0.50 0.67   CALCIUM 7.7* 7.4* 7.7*                   Imaging  NM-GASTRIC EMPTYING   Final Result      1. Marked gastroparesis, with no significant gastric emptying after 90 minutes.   2. Multiple episodes of gastroesophageal reflux during the 90 minutes of imaging.         CT-ABDOMEN-PELVIS WITH   Final Result   Addendum (preliminary) 1 of 1   Addendum: There is stable left renal pelvocaliectasis.      Final      1.  Mild distal esophageal wall thickening, question esophagitis.   2.  Horseshoe kidney.   3.  2 left renal calculi.      DX-CHEST-PORTABLE (1 VIEW)   Final Result      Mildly increased interstitial markings are noted at the lung bases, left worse than right.           Assessment/Plan  * Type 2 diabetes mellitus with hyperosmolar nonketotic hyperglycemia (HCC)- (present on admission)  Assessment & Plan  Patient is uncontrolled diabetes mellitus type 2  He openly endorses nonadherence and cannot give me a reason why.  Case management social work consult  Blood sugars remain decently controlled  Continue Lantus to 20 units twice daily  Insulin sliding scale and check A1c  I personally reviewed the blood sugars on May 9    Insomnia- (present on admission)  Assessment & Plan  Start trazodone 50 mg qhs    Epigastric pain- (present on admission)  Assessment & Plan  NM gastric emptying study confirms severe gastroparesis and severe GERD  Start scheduled IV reglan 10 mg Q6 hours  Change to omeprazole 20 mg BID  Limit opiates as much as possible  Lidocaine patch  Last EGD in 3/2023 with LA Grade D EE to mid-esophagus, but no other  issues      Full code status- (present on admission)  Assessment & Plan  Discussed advanced directives with patient he wishes to be full CODE STATUS    Elevated beta-hydroxybutyrate- (present on admission)  Assessment & Plan  resolved    Normochromic normocytic anemia- (present on admission)  Assessment & Plan  H/H slowly drifting down, but remains near his baseline, I personally reviewed the cbc on 5/8    Dyslipidemia- (present on admission)  Assessment & Plan  Low-fat low-cholesterol diet  Statin  Fasting lipid panel    Noncompliance with medications- (present on admission)  Assessment & Plan  Patient will need outpatient follow-ups and may be home health as well  Patient will need discussions to make sure that he becomes compliant, he has no answer    Leukocytosis- (present on admission)  Assessment & Plan  resolved, no e/o concurrent infection, defer abx's, trend wbc/fever curve  I personally reviewed the cbc on 5/9         VTE prophylaxis:    heparin ppx      I have performed a physical exam and reviewed and updated ROS and Plan today (5/9/2024). In review of yesterday's note (5/8/2024), there are no changes except as documented above.

## 2024-05-09 NOTE — CARE PLAN
The patient is Stable - Low risk of patient condition declining or worsening    Shift Goals  Clinical Goals: gastric emptying study in AM, pain control  Patient Goals: rest and comfort  Family Goals: KATHERINE    Progress made toward(s) clinical / shift goals:    Problem: Pain - Standard  Goal: Alleviation of pain or a reduction in pain to the patient’s comfort goal  Outcome: Progressing  Note: Pt reports 9/10 abdominal pain, medicated per MAR. Pt educated on reason for no narcotics or opioids. Pt demonstrates understanding.      Problem: Knowledge Deficit - Standard  Goal: Patient and family/care givers will demonstrate understanding of plan of care, disease process/condition, diagnostic tests and medications  Outcome: Progressing  Note: Pt updated with POC, agreeable. Gastric emptying study hopefully in AM. All questions and concerns addressed at this time.      Problem: Fall Risk  Goal: Patient will remain free from falls  Outcome: Progressing

## 2024-05-09 NOTE — PROGRESS NOTES
Telemetry summary    Rhythm: SR  Rate: 65-84  Ectopy: rPAC, rPVC  Measurements: 0.18/0.08/0.44    Normal Values  Rhythm: SR  HR Range:   Measurement: 0.12-0.2/0.06-0.10/0.30-0.52

## 2024-05-09 NOTE — THERAPY
Occupational Therapy Contact Note    Patient Name: Christoph Taylor  Age:  58 y.o., Sex:  male  Medical Record #: [id number]  Today's Date: 5/9/2024 05/09/24 0955   Treatment Variance   Reason For Missed Therapy Medical - Patient  in Procedure   Interdisciplinary Plan of Care Collaboration   IDT Collaboration with  Nursing   Collaboration Comments attempted, pt out of room at a test.  Rn reports pt likely willbe discharged home later today.  Will follow as able

## 2024-05-10 LAB
BASOPHILS # BLD AUTO: 0.1 % (ref 0–1.8)
BASOPHILS # BLD: 0.01 K/UL (ref 0–0.12)
EOSINOPHIL # BLD AUTO: 0.21 K/UL (ref 0–0.51)
EOSINOPHIL NFR BLD: 3.1 % (ref 0–6.9)
ERYTHROCYTE [DISTWIDTH] IN BLOOD BY AUTOMATED COUNT: 38.3 FL (ref 35.9–50)
GLUCOSE BLD STRIP.AUTO-MCNC: 224 MG/DL (ref 65–99)
GLUCOSE BLD STRIP.AUTO-MCNC: 234 MG/DL (ref 65–99)
GLUCOSE BLD STRIP.AUTO-MCNC: 290 MG/DL (ref 65–99)
GLUCOSE BLD STRIP.AUTO-MCNC: 62 MG/DL (ref 65–99)
GLUCOSE BLD STRIP.AUTO-MCNC: 71 MG/DL (ref 65–99)
GLUCOSE BLD STRIP.AUTO-MCNC: 94 MG/DL (ref 65–99)
HCT VFR BLD AUTO: 36.3 % (ref 42–52)
HGB BLD-MCNC: 12 G/DL (ref 14–18)
IMM GRANULOCYTES # BLD AUTO: 0.02 K/UL (ref 0–0.11)
IMM GRANULOCYTES NFR BLD AUTO: 0.3 % (ref 0–0.9)
LYMPHOCYTES # BLD AUTO: 1.03 K/UL (ref 1–4.8)
LYMPHOCYTES NFR BLD: 15 % (ref 22–41)
MCH RBC QN AUTO: 27.7 PG (ref 27–33)
MCHC RBC AUTO-ENTMCNC: 33.1 G/DL (ref 32.3–36.5)
MCV RBC AUTO: 83.8 FL (ref 81.4–97.8)
MONOCYTES # BLD AUTO: 0.45 K/UL (ref 0–0.85)
MONOCYTES NFR BLD AUTO: 6.6 % (ref 0–13.4)
NEUTROPHILS # BLD AUTO: 5.14 K/UL (ref 1.82–7.42)
NEUTROPHILS NFR BLD: 74.9 % (ref 44–72)
NRBC # BLD AUTO: 0 K/UL
NRBC BLD-RTO: 0 /100 WBC (ref 0–0.2)
PLATELET # BLD AUTO: 165 K/UL (ref 164–446)
PMV BLD AUTO: 8.9 FL (ref 9–12.9)
RBC # BLD AUTO: 4.33 M/UL (ref 4.7–6.1)
WBC # BLD AUTO: 6.9 K/UL (ref 4.8–10.8)

## 2024-05-10 PROCEDURE — 99232 SBSQ HOSP IP/OBS MODERATE 35: CPT | Performed by: HOSPITALIST

## 2024-05-10 RX ORDER — METOCLOPRAMIDE 10 MG/1
10 TABLET ORAL EVERY 6 HOURS
Status: DISCONTINUED | OUTPATIENT
Start: 2024-05-10 | End: 2024-05-13 | Stop reason: HOSPADM

## 2024-05-10 RX ADMIN — ACETAMINOPHEN 1000 MG: 500 TABLET, FILM COATED ORAL at 23:12

## 2024-05-10 RX ADMIN — METOCLOPRAMIDE 10 MG: 10 TABLET ORAL at 18:35

## 2024-05-10 RX ADMIN — CELECOXIB 200 MG: 200 CAPSULE ORAL at 18:35

## 2024-05-10 RX ADMIN — TRAZODONE HYDROCHLORIDE 50 MG: 50 TABLET ORAL at 20:13

## 2024-05-10 RX ADMIN — ASPIRIN 81 MG: 81 TABLET, COATED ORAL at 18:34

## 2024-05-10 RX ADMIN — OXYCODONE HYDROCHLORIDE 5 MG: 5 TABLET ORAL at 06:04

## 2024-05-10 RX ADMIN — GABAPENTIN 100 MG: 100 CAPSULE ORAL at 05:44

## 2024-05-10 RX ADMIN — OXYCODONE HYDROCHLORIDE 5 MG: 5 TABLET ORAL at 19:46

## 2024-05-10 RX ADMIN — ACETAMINOPHEN 1000 MG: 500 TABLET, FILM COATED ORAL at 05:44

## 2024-05-10 RX ADMIN — ACETAMINOPHEN 1000 MG: 500 TABLET, FILM COATED ORAL at 18:34

## 2024-05-10 RX ADMIN — RIVAROXABAN 10 MG: 10 TABLET, FILM COATED ORAL at 18:34

## 2024-05-10 RX ADMIN — GABAPENTIN 100 MG: 100 CAPSULE ORAL at 11:38

## 2024-05-10 RX ADMIN — OMEPRAZOLE 20 MG: 20 CAPSULE, DELAYED RELEASE ORAL at 18:34

## 2024-05-10 RX ADMIN — SUCRALFATE 1 G: 1 TABLET ORAL at 18:34

## 2024-05-10 RX ADMIN — OMEPRAZOLE 20 MG: 20 CAPSULE, DELAYED RELEASE ORAL at 05:45

## 2024-05-10 RX ADMIN — SUCRALFATE 1 G: 1 TABLET ORAL at 05:45

## 2024-05-10 RX ADMIN — SUCRALFATE 1 G: 1 TABLET ORAL at 11:38

## 2024-05-10 RX ADMIN — HEPARIN SODIUM 5000 UNITS: 5000 INJECTION, SOLUTION INTRAVENOUS; SUBCUTANEOUS at 05:45

## 2024-05-10 RX ADMIN — GABAPENTIN 100 MG: 100 CAPSULE ORAL at 18:34

## 2024-05-10 RX ADMIN — SENNOSIDES AND DOCUSATE SODIUM 2 TABLET: 50; 8.6 TABLET ORAL at 18:34

## 2024-05-10 RX ADMIN — METOCLOPRAMIDE 10 MG: 10 TABLET ORAL at 23:12

## 2024-05-10 RX ADMIN — CELECOXIB 200 MG: 200 CAPSULE ORAL at 05:44

## 2024-05-10 RX ADMIN — WHITE PETROLATUM: 1.75 OINTMENT TOPICAL at 18:23

## 2024-05-10 RX ADMIN — METOCLOPRAMIDE 10 MG: 5 INJECTION, SOLUTION INTRAMUSCULAR; INTRAVENOUS at 11:38

## 2024-05-10 RX ADMIN — ACETAMINOPHEN 1000 MG: 500 TABLET, FILM COATED ORAL at 12:00

## 2024-05-10 RX ADMIN — WHITE PETROLATUM: 1.75 OINTMENT TOPICAL at 06:02

## 2024-05-10 RX ADMIN — METOCLOPRAMIDE 10 MG: 5 INJECTION, SOLUTION INTRAMUSCULAR; INTRAVENOUS at 05:45

## 2024-05-10 ASSESSMENT — COGNITIVE AND FUNCTIONAL STATUS - GENERAL
TURNING FROM BACK TO SIDE WHILE IN FLAT BAD: A LITTLE
MOVING FROM LYING ON BACK TO SITTING ON SIDE OF FLAT BED: A LITTLE
MOBILITY SCORE: 17
MOVING TO AND FROM BED TO CHAIR: A LITTLE
SUGGESTED CMS G CODE MODIFIER MOBILITY: CK
WALKING IN HOSPITAL ROOM: A LITTLE
CLIMB 3 TO 5 STEPS WITH RAILING: A LOT
STANDING UP FROM CHAIR USING ARMS: A LITTLE

## 2024-05-10 ASSESSMENT — GAIT ASSESSMENTS
ASSISTIVE DEVICE: FRONT WHEEL WALKER
GAIT LEVEL OF ASSIST: CONTACT GUARD ASSIST
DEVIATION: DECREASED HEEL STRIKE;DECREASED TOE OFF
DISTANCE (FEET): 100

## 2024-05-10 ASSESSMENT — ENCOUNTER SYMPTOMS
VOMITING: 0
CHILLS: 0
ABDOMINAL PAIN: 1
WEAKNESS: 1
NAUSEA: 1
FEVER: 0
BACK PAIN: 1

## 2024-05-10 ASSESSMENT — PAIN DESCRIPTION - PAIN TYPE
TYPE: ACUTE PAIN
TYPE: ACUTE PAIN;CHRONIC PAIN

## 2024-05-10 NOTE — PROGRESS NOTES
Hypoglycemia Intervention    Hypoglycemia protocol intervention:  Blood glucose 62 at 0552.  Intervention: 8 oz of fruit juice   Repeat blood glucose 71 at 0615.  Intervention: 8 oz of fruit juice, snacks   Repeat blood glucose 94 at 0631  Dr. Jauregui notified of the above at 0558. Per Dr. Jauregui, hold morning insulin glargine dose.

## 2024-05-10 NOTE — PROGRESS NOTES
Telemetry summary    Rhythm: SR  Rate: 70-82  Ectopy: r-oPVC, rTrigem, rBigem, rPAC, rCouplet  Measurements: 0.16/0.10/0.38    Normal Values  Rhythm: SR  HR Range:   Measurement: 0.12-0.2/0.06-0.10/0.30-0.52

## 2024-05-10 NOTE — DISCHARGE PLANNING
Patient rolled back to observation/outpatient status per attending   MD determination, Zhang Bobo, and UR committee IPA secondary reviewer, Tod Chavarria. Condition code 44 completed.

## 2024-05-10 NOTE — CARE PLAN
The patient is Stable - Low risk of patient condition declining or worsening    Shift Goals  Clinical Goals: gastric emptying study in AM, pain control  Patient Goals: rest and comfort  Family Goals: KATHERINE    Progress made toward(s) clinical / shift goals:  n/a    Patient is not progressing towards the following goals:      Problem: Pain - Standard  Goal: Alleviation of pain or a reduction in pain to the patient’s comfort goal  Outcome: Not Progressing     Problem: Knowledge Deficit - Standard  Goal: Patient and family/care givers will demonstrate understanding of plan of care, disease process/condition, diagnostic tests and medications  Outcome: Not Progressing     Problem: Fall Risk  Goal: Patient will remain free from falls  Outcome: Not Progressing     Problem: Depression  Goal: Patient and family/caregiver will verbalize accurate information about at least two of the possible causes of depression, three-four of the signs and symptoms of depression  Outcome: Not Progressing

## 2024-05-10 NOTE — DIETARY
Nutrition Update:    Day 0 of admit.  Christoph Taylor is a 58 y.o. male with admitting DX of Type 2 diabetes mellitus with hyperosmolar nonketotic hyperglycemia (HCC) [E11.00].  Patient being followed to optimize nutrition.    Current Diet: consistent CHO (diabetic) diet w/ HS snack    Recorded PO intake improving w/ 2 most recent meals %    Problem: Nutritional:  Goal: Achieve adequate nutritional intake  Description: Patient will consume >50% of meals  Outcome: met    RD following.

## 2024-05-10 NOTE — CARE PLAN
The patient is Watcher - Medium risk of patient condition declining or worsening    Shift Goals  Clinical Goals: monitor BP, FSBG  Patient Goals: rest and comfort  Family Goals: KATHERINE    Progress made toward(s) clinical / shift goals:    Problem: Pain - Standard  Goal: Alleviation of pain or a reduction in pain to the patient’s comfort goal  Outcome: Progressing     Problem: Knowledge Deficit - Standard  Goal: Patient and family/care givers will demonstrate understanding of plan of care, disease process/condition, diagnostic tests and medications  Outcome: Progressing  Note: Pt updated on POC, agreeable. Pt is drowsy. Monitoring BP. All questions and concerns addressed at this time.      Problem: Fall Risk  Goal: Patient will remain free from falls  Outcome: Progressing  Note: Pt is high fall risk, fall precautions in place. Call light within reach.

## 2024-05-10 NOTE — THERAPY
Physical Therapy   Initial Evaluation     Patient Name: Christoph Taylor  Age:  58 y.o., Sex:  male  Medical Record #: [id number]  Today's Date: 5/10/2024     Precautions  Precautions: (P) Fall Risk  Comments: (P) Hx opiate use, AFO LLE    Assessment  Patient is 58 y.o. male with a diagnosis of uncontrolled type II DM with hyperosmolar nonketotic hyperglycemia. Pt reports today with LLE foot drop, poor and impulsive safety awareness, and pain. Pt was able to perform all bed mobility with SPV. Pt required VC during all mobility to ensure and maximize safety as well as TC and postural facilitation on LS to maintain upright posture. retirement through ambulation with FWW ~50ft pt reported feeling significantly weaker and reported that he felt like he was going to fall, but reported back to EOB safely with CGA. Pt was educated on the importance of upright tolerance to help improve ventilation and blood flow, as well as increasing activity to help counteract the weakness that he had been feeling. Pt then transferred to chair impulsive and again with poor safety awareness. Pt PLOF consists of using FWW and SPC in the community, and WC at home. Due to limited support at home and poor safety awareness and decreased functional strength, recommend post-acute placement for continued physical therapy services prior to discharge home.        Plan    Physical Therapy Initial Treatment Plan   Treatment Plan : (P) Gait Training, Neuro Re-Education / Balance, Stair Training, Therapeutic Activities, Therapeutic Exercise, Self Care / Home Evaluation, Equipment  Treatment Frequency: (P) 4 Times per Week  Duration: (P) Until Therapy Goals Met    DC Equipment Recommendations: (P) Front-Wheel Walker, Wheelchair  Discharge Recommendations: (P) Recommend post-acute placement for additional physical therapy services prior to discharge home        Objective       05/10/24 3555   Initial Contact Note    Initial Contact Note Order Received and  Verified, Physical Therapy Evaluation in Progress with Full Report to Follow.   Precautions   Precautions Fall Risk   Comments Hx opiate use, AFO LLE   Vitals   O2 Delivery Device Nasal Cannula   Vitals Comments 3L O2 at rest and with activity   Pain 0 - 10 Group   Location Abdomen;Back   Therapist Pain Assessment Prior to Activity;During Activity;Post Activity;Nurse Notified  (moderate pain levels)   Prior Living Situation   Prior Services Home-Independent   Housing / Facility 1 Story House  (Manufactured home)   Steps Into Home 5   Steps In Home 0   Rail Both Rail (Steps into Home)   Bathroom Set up Walk In Shower  (Small stool in shower)   Equipment Owned Front-Wheel Walker;Single Point Cane;Wheelchair   Lives with - Patient's Self Care Capacity Alone and Able to Care For Self   Comments Pt only lives with dog and states that neighbors would be able to help bring him to appointments but that is it.   Prior Level of Functional Mobility   Bed Mobility Independent   Transfer Status Independent   Ambulation Independent   Ambulation Distance Community used SPC and FWW but at home uses WC   Assistive Devices Used Front-Wheel Walker  (WC and SPC)   Wheelchair Independent   Stairs Independent   History of Falls   History of Falls No   Cognition    Cognition / Consciousness X   Level of Consciousness Alert   Safety Awareness Impulsive  (Pt significantly impulsive and angry when asked to perform any mobility but cooperative. Pt reported feeling significantly week mid ambulation but was able to return to EOB.)   Attention Impaired   Passive ROM Lower Body   Passive ROM Lower Body WDL   Active ROM Lower Body    Active ROM Lower Body  WDL   Strength Lower Body   Lower Body Strength  X   Comments LLE self reported foot drop. Examine next session. Functional for ambulation short distances.   Balance Assessment   Sitting Balance (Static) Good   Sitting Balance (Dynamic) Fair +   Standing Balance (Static) Fair   Standing Balance  (Dynamic) Fair -   Weight Shift Sitting Good   Weight Shift Standing Fair   Bed Mobility    Supine to Sit Supervised   Sit to Supine Supervised   Scooting Supervised   Rolling Supervised   Gait Analysis   Gait Level Of Assist Contact Guard   Assistive Device Front Wheel Walker   Distance (Feet) 100   # of Times Distance was Traveled 1   Deviation Decreased Heel Strike;Decreased Toe Off  (Significantly poor safety awareness,)   # of Stairs Climbed   (Not attempted)   Weight Bearing Status FWB   Functional Mobility   Sit to Stand Standby Assist   Bed, Chair, Wheelchair Transfer Standby Assist   Transfer Method Stand Step   Mobility bed mobility, STS, ambulation   6 Clicks Assessment - How much HELP from from another person do you currently need... (If the patient hasn't done an activity recently, how much help from another person do you think he/she would need if he/she tried?)   Turning from your back to your side while in a flat bed without using bedrails? 3   Moving from lying on your back to sitting on the side of a flat bed without using bedrails? 3   Moving to and from a bed to a chair (including a wheelchair)? 3   Standing up from a chair using your arms (e.g., wheelchair, or bedside chair)? 3   Walking in hospital room? 3   Climbing 3-5 steps with a railing? 2   6 clicks Mobility Score 17   Activity Tolerance   Sitting in Chair EOS   Sitting Edge of Bed 3min   Standing 3min   Edema / Skin Assessment   Edema / Skin  Not Assessed   Patient / Family Goals    Patient / Family Goal #1 PLOF   Short Term Goals    Short Term Goal # 1 Pt will be able to ambulate 150ft with FWW and Charity in order to ambulate household distances by end of 6 visits.   Short Term Goal # 2 Pt will be able to perform WC mobility 150ft Charity in order to perform WC mobility at home by end of 6 visits   Short Term Goal # 3 Pt will be able to navigate 5 stairs with bilateral handrail in order to perform stair navigation at home by end of 6 visits.    Education Group   Education Provided Role of Physical Therapist;Gait Training;Brace Wear and Care   Role of Physical Therapist Patient Response Patient;Acceptance;Explanation;Demonstration;Verbal Demonstration   Gait Training Patient Response Patient;Acceptance;Explanation;Verbal Demonstration;Action Demonstration;Reinforcement Needed   Brace Wear & Care Patient Response Patient;Acceptance;Explanation;Reinforcement Needed   Physical Therapy Initial Treatment Plan    Treatment Plan  Gait Training;Neuro Re-Education / Balance;Stair Training;Therapeutic Activities;Therapeutic Exercise;Self Care / Home Evaluation;Equipment   Treatment Frequency 4 Times per Week   Duration Until Therapy Goals Met   Problem List    Problems Pain;Impaired Ambulation;Impaired Balance;Decreased Activity Tolerance;Safety Awareness Deficits / Cognition;Functional Strength Deficit;    Anticipated Discharge Equipment and Recommendations   DC Equipment Recommendations Front-Wheel Walker;Wheelchair   Discharge Recommendations Recommend post-acute placement for additional physical therapy services prior to discharge home   Interdisciplinary Plan of Care Collaboration   IDT Collaboration with  Nursing;Occupational Therapist   Patient Position at End of Therapy Seated;Call Light within Reach;Phone within Reach;Tray Table within Reach   Collaboration Comments Aware of session and recs   Session Information   Date / Session Number  5/10-1 (1/4, 5/16)

## 2024-05-11 LAB
CHOLEST SERPL-MCNC: 135 MG/DL (ref 100–199)
GLUCOSE BLD STRIP.AUTO-MCNC: 234 MG/DL (ref 65–99)
GLUCOSE BLD STRIP.AUTO-MCNC: 245 MG/DL (ref 65–99)
GLUCOSE BLD STRIP.AUTO-MCNC: 265 MG/DL (ref 65–99)
HDLC SERPL-MCNC: 55 MG/DL
LDLC SERPL CALC-MCNC: 62 MG/DL
TRIGL SERPL-MCNC: 91 MG/DL (ref 0–149)

## 2024-05-11 PROCEDURE — 99232 SBSQ HOSP IP/OBS MODERATE 35: CPT | Performed by: HOSPITALIST

## 2024-05-11 RX ADMIN — SUCRALFATE 1 G: 1 TABLET ORAL at 12:40

## 2024-05-11 RX ADMIN — OXYCODONE HYDROCHLORIDE 5 MG: 5 TABLET ORAL at 06:01

## 2024-05-11 RX ADMIN — RIVAROXABAN 10 MG: 10 TABLET, FILM COATED ORAL at 17:14

## 2024-05-11 RX ADMIN — TRAZODONE HYDROCHLORIDE 50 MG: 50 TABLET ORAL at 20:44

## 2024-05-11 RX ADMIN — ACETAMINOPHEN 1000 MG: 500 TABLET, FILM COATED ORAL at 12:31

## 2024-05-11 RX ADMIN — ASPIRIN 81 MG: 81 TABLET, COATED ORAL at 17:14

## 2024-05-11 RX ADMIN — CELECOXIB 200 MG: 200 CAPSULE ORAL at 17:14

## 2024-05-11 RX ADMIN — METOCLOPRAMIDE 10 MG: 10 TABLET ORAL at 23:13

## 2024-05-11 RX ADMIN — ACETAMINOPHEN 1000 MG: 500 TABLET, FILM COATED ORAL at 06:01

## 2024-05-11 RX ADMIN — ACETAMINOPHEN 1000 MG: 500 TABLET, FILM COATED ORAL at 23:12

## 2024-05-11 RX ADMIN — METOCLOPRAMIDE 10 MG: 10 TABLET ORAL at 12:32

## 2024-05-11 RX ADMIN — METOCLOPRAMIDE 10 MG: 10 TABLET ORAL at 17:14

## 2024-05-11 RX ADMIN — GABAPENTIN 100 MG: 100 CAPSULE ORAL at 06:01

## 2024-05-11 RX ADMIN — WHITE PETROLATUM: 1.75 OINTMENT TOPICAL at 06:18

## 2024-05-11 RX ADMIN — OMEPRAZOLE 20 MG: 20 CAPSULE, DELAYED RELEASE ORAL at 06:00

## 2024-05-11 RX ADMIN — GABAPENTIN 100 MG: 100 CAPSULE ORAL at 17:14

## 2024-05-11 RX ADMIN — METOCLOPRAMIDE 10 MG: 10 TABLET ORAL at 06:01

## 2024-05-11 RX ADMIN — CELECOXIB 200 MG: 200 CAPSULE ORAL at 06:00

## 2024-05-11 RX ADMIN — WHITE PETROLATUM: 1.75 OINTMENT TOPICAL at 17:09

## 2024-05-11 RX ADMIN — SUCRALFATE 1 G: 1 TABLET ORAL at 06:00

## 2024-05-11 RX ADMIN — OMEPRAZOLE 20 MG: 20 CAPSULE, DELAYED RELEASE ORAL at 17:14

## 2024-05-11 RX ADMIN — SUCRALFATE 1 G: 1 TABLET ORAL at 17:14

## 2024-05-11 RX ADMIN — OXYCODONE HYDROCHLORIDE 5 MG: 5 TABLET ORAL at 16:03

## 2024-05-11 RX ADMIN — OXYCODONE HYDROCHLORIDE 5 MG: 5 TABLET ORAL at 20:44

## 2024-05-11 RX ADMIN — GABAPENTIN 100 MG: 100 CAPSULE ORAL at 12:32

## 2024-05-11 RX ADMIN — SENNOSIDES AND DOCUSATE SODIUM 2 TABLET: 50; 8.6 TABLET ORAL at 17:14

## 2024-05-11 ASSESSMENT — ENCOUNTER SYMPTOMS
NAUSEA: 1
CHILLS: 0
ABDOMINAL PAIN: 1
BACK PAIN: 1
WEAKNESS: 1
FEVER: 0
VOMITING: 0

## 2024-05-11 ASSESSMENT — COGNITIVE AND FUNCTIONAL STATUS - GENERAL
DRESSING REGULAR LOWER BODY CLOTHING: A LITTLE
HELP NEEDED FOR BATHING: A LITTLE
DAILY ACTIVITIY SCORE: 21
SUGGESTED CMS G CODE MODIFIER DAILY ACTIVITY: CJ
TOILETING: A LITTLE

## 2024-05-11 ASSESSMENT — FIBROSIS 4 INDEX: FIB4 SCORE: 1.14

## 2024-05-11 ASSESSMENT — PAIN DESCRIPTION - PAIN TYPE
TYPE: ACUTE PAIN

## 2024-05-11 NOTE — CARE PLAN
The patient is Stable - Low risk of patient condition declining or worsening    Shift Goals  Clinical Goals: pain management, d/c to [facility]  Patient Goals: rest, pain management, food  Family Goals: vicente    Progress made toward(s) clinical / shift goals:  Educated on diet and the multiple trays he requests.  Patient will mobilize to chair today, and verbalize pain management.     Patient is not progressing towards the following goals:      Problem: Pain - Standard  Goal: Alleviation of pain or a reduction in pain to the patient’s comfort goal  Outcome: Progressing     Problem: Knowledge Deficit - Standard  Goal: Patient and family/care givers will demonstrate understanding of plan of care, disease process/condition, diagnostic tests and medications  Outcome: Progressing     Problem: Fall Risk  Goal: Patient will remain free from falls  Outcome: Met     Problem: Depression  Goal: Patient and family/caregiver will verbalize accurate information about at least two of the possible causes of depression, three-four of the signs and symptoms of depression  Outcome: Met

## 2024-05-11 NOTE — PROGRESS NOTES
12-hour chart check complete.    Monitor Summary  Rhythm: NSR  Rate: 60's - 80's   Ectopy: rare PVC's, rare bigem, rare trigem  Measurements: 0.16 / 0.10 / 0.38

## 2024-05-11 NOTE — CARE PLAN
The patient is Stable - Low risk of patient condition declining or worsening    Shift Goals  Clinical Goals: Pain management, d/c to SNF, monitor VS, monitor labs  Patient Goals: Rest, pain management  Family Goals: KATHERINE    Progress made toward(s) clinical / shift goals:    Problem: Pain - Standard  Goal: Alleviation of pain or a reduction in pain to the patient’s comfort goal  Outcome: Progressing     Problem: Knowledge Deficit - Standard  Goal: Patient and family/care givers will demonstrate understanding of plan of care, disease process/condition, diagnostic tests and medications  Outcome: Progressing     Problem: Fall Risk  Goal: Patient will remain free from falls  Outcome: Progressing     Problem: Depression  Goal: Patient and family/caregiver will verbalize accurate information about at least two of the possible causes of depression, three-four of the signs and symptoms of depression  Outcome: Progressing       Patient is not progressing towards the following goals:

## 2024-05-11 NOTE — PROGRESS NOTES
OT at bedside, helping patient up to sink and will be getting a sponge bath, brush hair.  Patient requested a lean cuisine, heated up and at bedside.

## 2024-05-11 NOTE — THERAPY
Occupational Therapy  Daily Treatment     Patient Name: Christoph Taylor  Age:  58 y.o., Sex:  male  Medical Record #: [id number]  Today's Date: 5/11/2024     Precautions  Precautions: (P) Fall Risk  Comments: Hx opiate use, AFO LLE    Assessment  Pt shows improved participation; tolerates OOB for toileting,sponge bath,grooming, UB/LB dressing. Uses FWW for mobility, SBA. See below for CLOF. OT will continue to follow.        Plan  Treatment Plan Status: (P) Continue Current Treatment Plan  Type of Treatment: (P) Self Care / Activities of Daily Living, Neuro Re-Education / Balance, Therapeutic Activity, Therapeutic Exercises  Treatment Frequency: (P) 3 Times per Week  Treatment Duration: (P) Until Therapy Goals Met    DC Equipment Recommendations: (P) Unable to determine at this time  Discharge Recommendations: (P) Recommend post-acute placement for additional occupational therapy services prior to discharge home    Subjective  agreeable     Objective     05/11/24 1612   Precautions   Precautions Fall Risk   Vitals   O2 Delivery Device Room air w/o2 available   Pain 0 - 10 Group   Therapist Pain Assessment Nurse Notified;0   Cognition    Cognition / Consciousness WDL   Level of Consciousness Alert   Comments Improved, less impulsive.   Balance   Sitting Balance (Static) Good   Sitting Balance (Dynamic) Fair +   Standing Balance (Static) Fair +   Standing Balance (Dynamic) Fair   Weight Shift Sitting Good   Weight Shift Standing Fair   Skilled Intervention Verbal Cuing   Bed Mobility    Supine to Sit Modified Independent   Sit to Supine Modified Independent   Activities of Daily Living   Eating Independent   Grooming Standby Assist;Standing   Bathing Contact Guard Assist  (seated sponge bath)   Upper Body Dressing Supervision   Lower Body Dressing Minimal Assist   Toileting Standby Assist   Skilled Intervention Verbal Cuing   How much help from another person does the patient currently need...   Putting on and taking  off regular lower body clothing? 3   Bathing (including washing, rinsing, and drying)? 3   Toileting, which includes using a toilet, bedpan, or urinal? 3   Putting on and taking off regular upper body clothing? 4   Taking care of personal grooming such as brushing teeth? 4   Eating meals? 4   6 Clicks Daily Activity Score 21   Functional Mobility   Sit to Stand Supervised   Bed, Chair, Wheelchair Transfer Standby Assist   Toilet Transfers Standby Assist   Transfer Method Stand Step   Comments fww   Activity Tolerance   Sitting in Chair 7   Sitting Edge of Bed 15   Standing 2x2   Short Term Goals   Goal Outcome # 1 Progressing as expected   Goal Outcome # 2 Progressing as expected   Goal Outcome # 3 Progressing as expected   Education Group   Role of Occupational Therapist Patient Response Patient;Acceptance;Explanation;Verbal Demonstration   Brace Wear & Care Patient Response Patient;Acceptance;Explanation;Verbal Demonstration   Home Safety Patient Response Patient;Acceptance;Explanation;Verbal Demonstration   ADL Patient Response Patient;Acceptance;Explanation;Action Demonstration   Occupational Therapy Treatment Plan    O.T. Treatment Plan Continue Current Treatment Plan   Treatment Interventions Self Care / Activities of Daily Living;Neuro Re-Education / Balance;Therapeutic Activity;Therapeutic Exercises   Treatment Frequency 3 Times per Week   Duration Until Therapy Goals Met   Anticipated Discharge Equipment and Recommendations   DC Equipment Recommendations Unable to determine at this time   Discharge Recommendations Recommend post-acute placement for additional occupational therapy services prior to discharge home   Interdisciplinary Plan of Care Collaboration   IDT Collaboration with  Nursing   Patient Position at End of Therapy Edge of Bed;Call Light within Reach;Tray Table within Reach;Phone within Reach;Bed Alarm On   Collaboration Comments aware of session.

## 2024-05-11 NOTE — PROGRESS NOTES
Hospital Medicine Daily Progress Note    Date of Service  5/11/2024    Chief Complaint  Christoph Taylor is a 58 y.o. male admitted 5/6/2024 with poorly controlled type 2 diabetes mellitus secondary to medical nonadherence of an unknown reason.  He presented to the hospital [facility] which is now improving.  No clear evidence of concurrent infection.    Hospital Course  See above    Interval Problem Update  Patient is tolerating regular diet, he is in fact requesting extra food complains of mild epigastric pain, no nausea or vomiting  Blood sugars high ranging 245-290    I have discussed this patient's plan of care and discharge plan at IDT rounds today with Case Management, Nursing, Nursing leadership, and other members of the IDT team.    Consultants/Specialty  none    Code Status  Full Code    Disposition  The patient is not medically cleared for discharge to home or a post-acute facility.  Anticipate discharge to: skilled nursing facility    I have placed the appropriate orders for post-discharge needs.    Review of Systems  Review of Systems   Constitutional:  Negative for chills and fever.   Gastrointestinal:  Positive for abdominal pain (quite severe) and nausea. Negative for vomiting.   Musculoskeletal:  Positive for back pain.   Skin:  Negative for rash.   Neurological:  Positive for weakness.        Physical Exam  Temp:  [36.3 °C (97.4 °F)-36.9 °C (98.5 °F)] 36.6 °C (97.8 °F)  Pulse:  [67-80] 71  Resp:  [16-20] 18  BP: ()/(55-69) 99/60  SpO2:  [91 %-99 %] 97 %    Physical Exam  Vitals and nursing note reviewed.   Constitutional:       General: He is not in acute distress.     Appearance: He is well-developed.      Comments: Flat affect, writhing in bed, mildly screaming  No obvious change noted today   HENT:      Head: Normocephalic and atraumatic.      Left Ear: External ear normal.      Mouth/Throat:      Pharynx: No oropharyngeal exudate.   Eyes:      General: No scleral icterus.     Pupils:  Pupils are equal, round, and reactive to light.   Neck:      Thyroid: No thyromegaly.   Cardiovascular:      Rate and Rhythm: Normal rate and regular rhythm.      Heart sounds: Normal heart sounds. No murmur heard.  Pulmonary:      Effort: Pulmonary effort is normal. No respiratory distress.      Breath sounds: Normal breath sounds. No wheezing.   Abdominal:      General: Bowel sounds are normal. There is no distension.      Palpations: Abdomen is soft.      Tenderness: There is abdominal tenderness. There is no guarding or rebound.   Musculoskeletal:         General: No tenderness. Normal range of motion.      Cervical back: Normal range of motion and neck supple.      Right lower leg: No edema.      Left lower leg: No edema.   Skin:     General: Skin is warm and dry.      Findings: No rash.   Neurological:      Mental Status: He is alert and oriented to person, place, and time.      Cranial Nerves: No cranial nerve deficit.      Comments: Hands contracted         Fluids  No intake or output data in the 24 hours ending 05/11/24 1440        Laboratory  Recent Labs     05/09/24  0211 05/10/24  0216   WBC 6.7 6.9   RBC 3.99* 4.33*   HEMOGLOBIN 11.2* 12.0*   HEMATOCRIT 33.2* 36.3*   MCV 83.2 83.8   MCH 28.1 27.7   MCHC 33.7 33.1   RDW 38.8 38.3   PLATELETCT 165 165   MPV 9.2 8.9*     Recent Labs     05/09/24  0211   SODIUM 136   POTASSIUM 3.4*   CHLORIDE 96   CO2 33   GLUCOSE 169*   BUN 14   CREATININE 0.67   CALCIUM 7.7*             Recent Labs     05/11/24  0136   TRIGLYCERIDE 91   HDL 55   LDL 62       Imaging  NM-GASTRIC EMPTYING   Final Result      1. Marked gastroparesis, with no significant gastric emptying after 90 minutes.   2. Multiple episodes of gastroesophageal reflux during the 90 minutes of imaging.         CT-ABDOMEN-PELVIS WITH   Final Result   Addendum (preliminary) 1 of 1   Addendum: There is stable left renal pelvocaliectasis.      Final      1.  Mild distal esophageal wall thickening, question  esophagitis.   2.  Horseshoe kidney.   3.  2 left renal calculi.      DX-CHEST-PORTABLE (1 VIEW)   Final Result      Mildly increased interstitial markings are noted at the lung bases, left worse than right.           Assessment/Plan  * Type 2 diabetes mellitus with hyperosmolar nonketotic hyperglycemia (HCC)- (present on admission)  Assessment & Plan  Noncompliant with treatment    5/11:  FSBG's reviewed  Hyperglycemia has returned as he is eating more  Increased Lantus dose      Epigastric pain- (present on admission)  Assessment & Plan  NM gastric emptying study confirms severe gastroparesis and severe GERD    5/11:  Patient is tolerating diet at this time  Continue p.o. Reglan        Leukocytosis- (present on admission)  Assessment & Plan  Resolved    Insomnia- (present on admission)  Assessment & Plan  Trazodone at night    Full code status- (present on admission)  Assessment & Plan  Discussed advanced directives with patient he wishes to be full CODE STATUS    Elevated beta-hydroxybutyrate- (present on admission)  Assessment & Plan  resolved    Normochromic normocytic anemia- (present on admission)  Assessment & Plan  No need for transfusion    Dyslipidemia- (present on admission)  Assessment & Plan  5/11:  Lipid panel reviewed, his LDL is at goal    Noncompliance with medications- (present on admission)  Assessment & Plan  Encourage compliance         VTE prophylaxis:    Xarelto 10mg daily as prophylaxis      I have performed a physical exam and reviewed and updated ROS and Plan today (5/11/2024). In review of yesterday's note (5/10/2024), there are no changes except as documented above.

## 2024-05-11 NOTE — PROGRESS NOTES
Hospital Medicine Daily Progress Note    Date of Service  5/10/2024    Chief Complaint  Christoph Taylor is a 58 y.o. male admitted 5/6/2024 with poorly controlled type 2 diabetes mellitus secondary to medical nonadherence of an unknown reason.  He presented to the hospital [facility] which is now improving.  No clear evidence of concurrent infection.    Hospital Course  See above    Interval Problem Update  Hypoglycemic this morning with a.m. blood sugar of 62  Patient is overall feeling better, he tolerated regular breakfast this morning without nausea vomiting, continues some mild abdominal pain  Agreeable to trial of Reglan  Discussed his functional deficits, he is agreeable to SNF referral    I have discussed this patient's plan of care and discharge plan at IDT rounds today with Case Management, Nursing, Nursing leadership, and other members of the IDT team.    Consultants/Specialty  none    Code Status  Full Code    Disposition  Medically Cleared  I have placed the appropriate orders for post-discharge needs.    Review of Systems  Review of Systems   Constitutional:  Negative for chills and fever.   Gastrointestinal:  Positive for abdominal pain (quite severe) and nausea. Negative for vomiting.   Musculoskeletal:  Positive for back pain.   Neurological:  Positive for weakness.        Physical Exam  Temp:  [36.3 °C (97.4 °F)-37.1 °C (98.7 °F)] 36.4 °C (97.6 °F)  Pulse:  [56-75] 70  Resp:  [12-18] 18  BP: ()/(45-77) 109/64  SpO2:  [94 %-100 %] 99 %    Physical Exam  Vitals and nursing note reviewed.   Constitutional:       General: He is not in acute distress.     Appearance: He is well-developed.      Comments: Flat affect, writhing in bed, mildly screaming  No obvious change noted today   HENT:      Head: Normocephalic and atraumatic.      Right Ear: External ear normal.      Left Ear: External ear normal.      Mouth/Throat:      Pharynx: No oropharyngeal exudate.   Eyes:      General: No scleral  icterus.     Pupils: Pupils are equal, round, and reactive to light.   Neck:      Thyroid: No thyromegaly.   Cardiovascular:      Rate and Rhythm: Normal rate and regular rhythm.      Heart sounds: Normal heart sounds. No murmur heard.  Pulmonary:      Effort: Pulmonary effort is normal. No respiratory distress.      Breath sounds: Normal breath sounds. No wheezing.   Abdominal:      General: Bowel sounds are normal. There is no distension.      Palpations: Abdomen is soft.      Tenderness: There is abdominal tenderness. There is no guarding or rebound.   Musculoskeletal:         General: No tenderness. Normal range of motion.      Cervical back: Normal range of motion and neck supple.      Right lower leg: No edema.      Left lower leg: No edema.   Skin:     General: Skin is warm and dry.      Findings: No rash.   Neurological:      Mental Status: He is alert and oriented to person, place, and time.      Cranial Nerves: No cranial nerve deficit.         Fluids    Intake/Output Summary (Last 24 hours) at 5/10/2024 1749  Last data filed at 5/10/2024 1314  Gross per 24 hour   Intake 1305.98 ml   Output --   Net 1305.98 ml         Laboratory  Recent Labs     05/09/24  0211 05/10/24  0216   WBC 6.7 6.9   RBC 3.99* 4.33*   HEMOGLOBIN 11.2* 12.0*   HEMATOCRIT 33.2* 36.3*   MCV 83.2 83.8   MCH 28.1 27.7   MCHC 33.7 33.1   RDW 38.8 38.3   PLATELETCT 165 165   MPV 9.2 8.9*     Recent Labs     05/08/24  0052 05/09/24  0211   SODIUM 135 136   POTASSIUM 3.5* 3.4*   CHLORIDE 95* 96   CO2 32 33   GLUCOSE 160* 169*   BUN 14 14   CREATININE 0.50 0.67   CALCIUM 7.4* 7.7*                   Imaging  NM-GASTRIC EMPTYING   Final Result      1. Marked gastroparesis, with no significant gastric emptying after 90 minutes.   2. Multiple episodes of gastroesophageal reflux during the 90 minutes of imaging.         CT-ABDOMEN-PELVIS WITH   Final Result   Addendum (preliminary) 1 of 1   Addendum: There is stable left renal pelvocaliectasis.       Final      1.  Mild distal esophageal wall thickening, question esophagitis.   2.  Horseshoe kidney.   3.  2 left renal calculi.      DX-CHEST-PORTABLE (1 VIEW)   Final Result      Mildly increased interstitial markings are noted at the lung bases, left worse than right.           Assessment/Plan  * Type 2 diabetes mellitus with hyperosmolar nonketotic hyperglycemia (HCC)- (present on admission)  Assessment & Plan  Noncompliant with treatment    5/10:  Continue Lantus twice daily  FSBG's reviewed, he has become hypoglycemic, reduce Lantus dose by 50%  Monitor blood sugars, p.m. snack      Epigastric pain- (present on admission)  Assessment & Plan  NM gastric emptying study confirms severe gastroparesis and severe GERD    5/10:  Improvement today  Transition to p.o. Reglan  Blood sugar control        Leukocytosis- (present on admission)  Assessment & Plan  5/10:  Resolved    Insomnia- (present on admission)  Assessment & Plan  Trazodone at night    Full code status- (present on admission)  Assessment & Plan  Discussed advanced directives with patient he wishes to be full CODE STATUS    Elevated beta-hydroxybutyrate- (present on admission)  Assessment & Plan  resolved    Normochromic normocytic anemia- (present on admission)  Assessment & Plan  No need for transfusion    Dyslipidemia- (present on admission)  Assessment & Plan  5/10:  Lipid panel a.m.    Noncompliance with medications- (present on admission)  Assessment & Plan  Encourage compliance         VTE prophylaxis:    Xarelto 10mg daily as prophylaxis      I have performed a physical exam and reviewed and updated ROS and Plan today (5/10/2024). In review of yesterday's note (5/9/2024), there are no changes except as documented above.

## 2024-05-12 ENCOUNTER — APPOINTMENT (OUTPATIENT)
Dept: RADIOLOGY | Facility: MEDICAL CENTER | Age: 59
End: 2024-05-12
Attending: HOSPITALIST
Payer: COMMERCIAL

## 2024-05-12 LAB
GLUCOSE BLD STRIP.AUTO-MCNC: 131 MG/DL (ref 65–99)
GLUCOSE BLD STRIP.AUTO-MCNC: 226 MG/DL (ref 65–99)
GLUCOSE BLD STRIP.AUTO-MCNC: 246 MG/DL (ref 65–99)
GLUCOSE BLD STRIP.AUTO-MCNC: 279 MG/DL (ref 65–99)

## 2024-05-12 RX ORDER — LIDOCAINE 4 G/G
1 PATCH TOPICAL EVERY 24 HOURS
Status: DISCONTINUED | OUTPATIENT
Start: 2024-05-12 | End: 2024-05-13 | Stop reason: HOSPADM

## 2024-05-12 RX ADMIN — CELECOXIB 200 MG: 200 CAPSULE ORAL at 17:09

## 2024-05-12 RX ADMIN — ASPIRIN 81 MG: 81 TABLET, COATED ORAL at 17:09

## 2024-05-12 RX ADMIN — OXYCODONE HYDROCHLORIDE 5 MG: 5 TABLET ORAL at 12:12

## 2024-05-12 RX ADMIN — TRAZODONE HYDROCHLORIDE 50 MG: 50 TABLET ORAL at 20:29

## 2024-05-12 RX ADMIN — SUCRALFATE 1 G: 1 TABLET ORAL at 11:51

## 2024-05-12 RX ADMIN — GABAPENTIN 100 MG: 100 CAPSULE ORAL at 11:50

## 2024-05-12 RX ADMIN — GABAPENTIN 100 MG: 100 CAPSULE ORAL at 17:10

## 2024-05-12 RX ADMIN — SENNOSIDES AND DOCUSATE SODIUM 2 TABLET: 50; 8.6 TABLET ORAL at 17:10

## 2024-05-12 RX ADMIN — OMEPRAZOLE 20 MG: 20 CAPSULE, DELAYED RELEASE ORAL at 17:10

## 2024-05-12 RX ADMIN — CELECOXIB 200 MG: 200 CAPSULE ORAL at 06:00

## 2024-05-12 RX ADMIN — ACETAMINOPHEN 1000 MG: 500 TABLET, FILM COATED ORAL at 11:50

## 2024-05-12 RX ADMIN — OXYCODONE HYDROCHLORIDE 5 MG: 5 TABLET ORAL at 17:18

## 2024-05-12 RX ADMIN — SUCRALFATE 1 G: 1 TABLET ORAL at 07:00

## 2024-05-12 RX ADMIN — GABAPENTIN 100 MG: 100 CAPSULE ORAL at 06:15

## 2024-05-12 RX ADMIN — WHITE PETROLATUM: 1.75 OINTMENT TOPICAL at 18:01

## 2024-05-12 RX ADMIN — ACETAMINOPHEN 1000 MG: 500 TABLET, FILM COATED ORAL at 17:10

## 2024-05-12 RX ADMIN — METOCLOPRAMIDE 10 MG: 10 TABLET ORAL at 18:00

## 2024-05-12 RX ADMIN — WHITE PETROLATUM: 1.75 OINTMENT TOPICAL at 06:15

## 2024-05-12 RX ADMIN — OMEPRAZOLE 20 MG: 20 CAPSULE, DELAYED RELEASE ORAL at 06:15

## 2024-05-12 RX ADMIN — ACETAMINOPHEN 1000 MG: 500 TABLET, FILM COATED ORAL at 06:15

## 2024-05-12 RX ADMIN — SUCRALFATE 1 G: 1 TABLET ORAL at 17:09

## 2024-05-12 RX ADMIN — RIVAROXABAN 10 MG: 10 TABLET, FILM COATED ORAL at 17:10

## 2024-05-12 RX ADMIN — LIDOCAINE 1 PATCH: 4 PATCH TOPICAL at 15:08

## 2024-05-12 RX ADMIN — METOCLOPRAMIDE 10 MG: 10 TABLET ORAL at 06:16

## 2024-05-12 RX ADMIN — METOCLOPRAMIDE 10 MG: 10 TABLET ORAL at 11:50

## 2024-05-12 ASSESSMENT — GAIT ASSESSMENTS
ASSISTIVE DEVICE: FRONT WHEEL WALKER
DISTANCE (FEET): 20
GAIT LEVEL OF ASSIST: CONTACT GUARD ASSIST
DEVIATION: DECREASED HEEL STRIKE;DECREASED TOE OFF;STEP TO;ANTALGIC

## 2024-05-12 ASSESSMENT — PAIN DESCRIPTION - PAIN TYPE
TYPE: CHRONIC PAIN

## 2024-05-12 NOTE — PROGRESS NOTES
4 Eyes Skin Assessment Completed by PENNIE Fontenot and PENNIE Calderon.    Head WDL  Ears Redness and Blanching  Nose WDL  Mouth WDL  Neck WDL  Breast/Chest WDL  Shoulder Blades WDL  Spine WDL  (R) Arm/Elbow/Hand Redness, Blanching, and Scab. Dry, Cracked and Calloused hand.   (L) Arm/Elbow/Hand Redness, Blanching, and Scab Dry, Cracked and Calloused hand  Abdomen WDL  Groin Redness and Blanching  Scrotum/Coccyx/Buttocks Redness and Blanching  (R) Leg Redness,Scattered Scabs  (L) Leg Redness, Scattered Scabs  (R) Heel/Foot/Toe Redness, Dry, Flaky  (L) Heel/Foot/Toe Scab Big toe, Redness, Flaky,           Devices In Places Pulse Ox and Ortho Boot/Shoe      Interventions In Place Pillows    Possible Skin Injury Yes    Pictures Uploaded Into Epic Yes  Wound Consult Placed Yes  RN Wound Prevention Protocol Ordered Yes

## 2024-05-12 NOTE — CARE PLAN
The patient is Stable - Low risk of patient condition declining or worsening    Shift Goals  Clinical Goals: pt will remain at stated pain goal of 3/10 pain. pt will not sustain a fall this shift.  Patient Goals: sleep comfortably  Family Goals: vicetne    Progress made toward(s) clinical / shift goals:      Pt required 2 doses oxycodone 5mg to remain at stated pain goal    Pt placed on high fall precautions. No falls this shift.    Problem: Pain - Standard  Goal: Alleviation of pain or a reduction in pain to the patient’s comfort goal  Outcome: Progressing     Problem: Knowledge Deficit - Standard  Goal: Patient and family/care givers will demonstrate understanding of plan of care, disease process/condition, diagnostic tests and medications  Outcome: Progressing       Patient is not progressing towards the following goals:       28-Jun-2021 07:28

## 2024-05-12 NOTE — PROGRESS NOTES
Pt is awake in bed. Pt c/o 6/10 pain to lower back. Medicated per MAR. No n/v. RN discussed POC with pt for the evening. All questions answered. High fall precautions in place.

## 2024-05-12 NOTE — PROGRESS NOTES
Received pt's report from Mariel CASPER. Pt transferred via WC, not in any distress on RA at 92%. AAOx4. Oriented to floor. Reported pain, denies nausea. Discussed POC. Fall risk precautions in place, locked bed in lowest position, bed alarm on and call light within reach. All needs met at this time.

## 2024-05-12 NOTE — CARE PLAN
The patient is Stable - Low risk of patient condition declining or worsening    Shift Goals  Clinical Goals: patient will remain free from falls during shift  Patient Goals: rest  Family Goals:     Progress made toward(s) clinical / shift goals:  Bed alarm on. Bed in low and locked position. Call light within reach.     Patient is not progressing towards the following goals:

## 2024-05-12 NOTE — DISCHARGE PLANNING
Received choice form @: No choice received  Agency/Facility name: [facility]  Sent referral per choice form @: 0933

## 2024-05-12 NOTE — FACE TO FACE
Face to Face Supporting Documentation - Home Health    The encounter with this patient was in whole or in part the primary reason for home health admission.    Date of encounter:   Patient:                    MRN:                       YOB: 2025  Christoph Taylor  [id number]  1965     Home health to see patient for:  Skilled Nursing care for assessment, interventions & education, Physical Therapy evaluation and treatment, and Occupational therapy evaluation and treatment    Skilled need for:  New Onset Medical Diagnosis hyperosmolar non-ketotic state    Skilled nursing interventions to include:  Comment: medication managment    Homebound status evidenced by:  Require the use of special transportation or Needs the assistance of another person in order to leave the home. Leaving home requires a considerable and taxing effort. There is a normal inability to leave the home.    Community Physician to provide follow up care: YESY Tariq.     Optional Interventions? No      I certify the face to face encounter for this home health care referral meets the CMS requirements and the encounter/clinical assessment with the patient was, in whole, or in part, for the medical condition(s) listed above, which is the primary reason for home health care. Based on my clinical findings: the service(s) are medically necessary, support the need for home health care, and the homebound criteria are met.  I certify that this patient has had a face to face encounter by myself.  Zhang Bobo M.D. - NPI: [id number]

## 2024-05-12 NOTE — DISCHARGE PLANNING
Received choice form @: 1694  Agency/Facility name: [facility]  Sent referral per choice form @: 2518

## 2024-05-12 NOTE — PROGRESS NOTES
"Patient requesting for another lunch tray. This RN called for another lunch tray. Patient states \"I would like to go home.\" Notified Dr. Maykel MAC to speak with patient.   "

## 2024-05-13 ENCOUNTER — PHARMACY VISIT (OUTPATIENT)
Dept: PHARMACY | Facility: MEDICAL CENTER | Age: 59
End: 2024-05-13
Payer: COMMERCIAL

## 2024-05-13 VITALS
SYSTOLIC BLOOD PRESSURE: 100 MMHG | HEART RATE: 80 BPM | OXYGEN SATURATION: 94 % | RESPIRATION RATE: 18 BRPM | WEIGHT: 177.91 LBS | BODY MASS INDEX: 24.1 KG/M2 | HEIGHT: 72 IN | TEMPERATURE: 97.3 F | DIASTOLIC BLOOD PRESSURE: 56 MMHG

## 2024-05-13 PROBLEM — R10.13 EPIGASTRIC PAIN: Status: RESOLVED | Noted: 2024-05-07 | Resolved: 2024-05-13

## 2024-05-13 PROBLEM — R78.89 ELEVATED BETA-HYDROXYBUTYRATE: Status: RESOLVED | Noted: 2024-05-06 | Resolved: 2024-05-13

## 2024-05-13 PROBLEM — D72.829 LEUKOCYTOSIS: Status: RESOLVED | Noted: 2021-11-07 | Resolved: 2024-05-13

## 2024-05-13 LAB
GLUCOSE BLD STRIP.AUTO-MCNC: 117 MG/DL (ref 65–99)
GLUCOSE BLD STRIP.AUTO-MCNC: 301 MG/DL (ref 65–99)

## 2024-05-13 PROCEDURE — RXMED WILLOW AMBULATORY MEDICATION CHARGE: Performed by: HOSPITALIST

## 2024-05-13 PROCEDURE — 99239 HOSP IP/OBS DSCHRG MGMT >30: CPT | Performed by: HOSPITALIST

## 2024-05-13 RX ORDER — OXYCODONE HYDROCHLORIDE 5 MG/1
5 TABLET ORAL
Qty: 10 TABLET | Refills: 0 | Status: SHIPPED | OUTPATIENT
Start: 2024-05-13 | End: 2024-05-18

## 2024-05-13 RX ORDER — INSULIN GLARGINE 100 [IU]/ML
20 INJECTION, SOLUTION SUBCUTANEOUS 2 TIMES DAILY
Qty: 15 ML | Refills: 0 | Status: ON HOLD | OUTPATIENT
Start: 2024-05-13

## 2024-05-13 RX ADMIN — METOCLOPRAMIDE 10 MG: 10 TABLET ORAL at 00:34

## 2024-05-13 RX ADMIN — CELECOXIB 200 MG: 200 CAPSULE ORAL at 04:54

## 2024-05-13 RX ADMIN — GABAPENTIN 100 MG: 100 CAPSULE ORAL at 11:14

## 2024-05-13 RX ADMIN — SUCRALFATE 1 G: 1 TABLET ORAL at 05:40

## 2024-05-13 RX ADMIN — METOCLOPRAMIDE 10 MG: 10 TABLET ORAL at 04:55

## 2024-05-13 RX ADMIN — GABAPENTIN 100 MG: 100 CAPSULE ORAL at 04:54

## 2024-05-13 RX ADMIN — SUCRALFATE 1 G: 1 TABLET ORAL at 11:14

## 2024-05-13 RX ADMIN — WHITE PETROLATUM: 1.75 OINTMENT TOPICAL at 05:02

## 2024-05-13 RX ADMIN — OXYCODONE HYDROCHLORIDE 10 MG: 10 TABLET ORAL at 11:14

## 2024-05-13 RX ADMIN — METOCLOPRAMIDE 10 MG: 10 TABLET ORAL at 11:14

## 2024-05-13 RX ADMIN — OMEPRAZOLE 20 MG: 20 CAPSULE, DELAYED RELEASE ORAL at 04:55

## 2024-05-13 RX ADMIN — ACETAMINOPHEN 1000 MG: 500 TABLET, FILM COATED ORAL at 11:14

## 2024-05-13 RX ADMIN — ACETAMINOPHEN 1000 MG: 500 TABLET, FILM COATED ORAL at 00:34

## 2024-05-13 RX ADMIN — OXYCODONE HYDROCHLORIDE 5 MG: 5 TABLET ORAL at 04:53

## 2024-05-13 ASSESSMENT — PAIN DESCRIPTION - PAIN TYPE
TYPE: ACUTE PAIN

## 2024-05-13 NOTE — CARE PLAN
The patient is Stable - Low risk of patient condition declining or worsening    Shift Goals  Clinical Goals: Patient will be free from fall, monitor BS  Patient Goals: able to go home  Family Goals: vicente    Progress made toward(s) clinical / shift goals:  Patient remained free from fall, fall precautions observe. For DC today    Patient is not progressing towards the following goals:NA

## 2024-05-13 NOTE — PROGRESS NOTES
Received patient from Night shift RN . Patient is awake and alert.No sign of distress. Fall precaution in place, kept bed in lowest position and call light within reach.Prafo boot noted to LLE. Patient reports he is hungry, snacks provided.     1332-Discharge paper work reviewed with patient  at bedside.Copy given.Questions encouraged and aswered.  Cab voucher given to patient  As per  ,HH is still not set up but patient can go  Awaiting meds to beds to be delivered, called already pharmacy    1357-meds to beds delivered.   Discharge paper work reviewed with patient  at bedside.Copy given.Questions encouraged and aswered. I.V removed.   Awaiting flo.do cab to pick him up    1450- assisted patient via wheelchair to ED Entrance. Patient Dc to home

## 2024-05-13 NOTE — DISCHARGE SUMMARY
Discharge Summary    CHIEF COMPLAINT ON ADMISSION  Chief Complaint   Patient presents with    Abdominal Pain     LUQ x3 days with N/V/D. Denies noting any bloody emesis or stools. Denies known fevers.       Reason for Admission  Abdominal Pain, N/V     Admission Date  5/6/2024    CODE STATUS  Full Code    HPI & HOSPITAL COURSE  This is a 58 y.o. male here with abdominal pain.    Christoph Taylor has past medical history includes type 2 diabetes.  The patient has had multiple complications including diabetic foot infection.  The patient presented to the emergency room on 5/6/2024 with complaints of abdominal pain and high blood sugar.  The patient was found to be in hyper osmolar state.  He was admitted to the hospital for insulin and IV fluids.  Patient's metabolic parameters gradually improved.  He continues to have some hyperglycemia but overall his blood sugar control is reasonable.  Patient continues to have some abdominal pain gastric emptying study was consistent with gastroparesis.  The patient abdominal symptoms have actually improved significantly and he is tolerating regular diet at this time.  Skilled nursing placement was considered due to the patient's history of poor compliance and need for physical therapy.  Discussed with the patient at length today, he would prefer to be discharged home.  Home health has been ordered    Therefore, he is discharged in fair and stable condition to home with close outpatient follow-up.    Discharge Date  5/13/2024    FOLLOW UP ITEMS POST DISCHARGE  Follow-up with primary care provider, the patient be followed by home health    DISCHARGE DIAGNOSES  Principal Problem:    Type 2 diabetes mellitus with hyperosmolar nonketotic hyperglycemia (HCC) (POA: Yes)  Active Problems:    Noncompliance with medications (POA: Yes)    Dyslipidemia (POA: Yes)    Normochromic normocytic anemia (POA: Yes)    Full code status (POA: Yes)    Insomnia (POA: Yes)  Resolved Problems:    Epigastric  pain (POA: Yes)    Leukocytosis (POA: Yes)    Elevated beta-hydroxybutyrate (POA: Yes)      FOLLOW UP  No future appointments.  No follow-up provider specified.    MEDICATIONS ON DISCHARGE     Medication List        CHANGE how you take these medications        Instructions   * Lantus 100 UNIT/ML Soln  What changed: Another medication with the same name was added. Make sure you understand how and when to take each.  Generic drug: insulin glargine   Inject 10-25 Units under the skin 2 times a day. Pt reports that this medication is prescribed 20 units BID, pt reports that he does a sliding scale ranging from 10-25 units BID  Dose: 10-25 Units     * Lantus SoloStar 100 UNIT/ML Sopn injection  What changed: You were already taking a medication with the same name, and this prescription was added. Make sure you understand how and when to take each.  Generic drug: insulin glargine   Inject 20 Units under the skin 2 times a day.  Dose: 20 Units     oxyCODONE immediate-release 5 MG Tabs  What changed:   when to take this  reasons to take this  Commonly known as: Roxicodone   Take 1 Tablet by mouth every 3 hours as needed for Severe Pain for up to 5 days.  Dose: 5 mg           * This list has 2 medication(s) that are the same as other medications prescribed for you. Read the directions carefully, and ask your doctor or other care provider to review them with you.                CONTINUE taking these medications        Instructions   aspirin 81 MG EC tablet   Take 81 mg by mouth every evening.  Dose: 81 mg     famotidine 20 MG Tabs  Commonly known as: Pepcid   Take 1 Tablet by mouth 2 times a day.  Dose: 20 mg     metoclopramide 10 MG Tabs  Commonly known as: Reglan   Take 1 Tablet by mouth 3 times a day as needed (nausea vomiting).  Dose: 10 mg     multivitamin Tabs   Take 1 Tablet by mouth every evening.  Dose: 1 Tablet     sucralfate 1 GM Tabs  Commonly known as: Carafate   Take 1 Tablet by mouth 3 times a day before meals  for 7 days.  Dose: 1 g              Allergies  No Known Allergies    DIET  Orders Placed This Encounter   Procedures    Diet Order Diet: Consistent CHO (Diabetic) (okay to have multple trays)     Standing Status:   Standing     Number of Occurrences:   1     Order Specific Question:   Diet:     Answer:   Consistent CHO (Diabetic) [4]     Comments:   okay to have multple trays       ACTIVITY  As tolerated.  Weight bearing as tolerated    CONSULTATIONS  5/13/2024    PROCEDURES  CT scan the abdomen pelvis 5/6/2024:  1.  Mild distal esophageal wall thickening, question esophagitis.  2.  Horseshoe kidney.  3.  2 left renal calculi.                Nuclear medicine gastric emptying study:  iMPRESSION:  1. Marked gastroparesis, with no significant gastric emptying after 90 minutes.  2. Multiple episodes of gastroesophageal reflux during the 90 minutes of imaging.    LABORATORY  Lab Results   Component Value Date    SODIUM 136 05/09/2024    POTASSIUM 3.4 (L) 05/09/2024    CHLORIDE 96 05/09/2024    CO2 33 05/09/2024    GLUCOSE 169 (H) 05/09/2024    BUN 14 05/09/2024    CREATININE 0.67 05/09/2024        Lab Results   Component Value Date    WBC 6.9 05/10/2024    HEMOGLOBIN 12.0 (L) 05/10/2024    HEMATOCRIT 36.3 (L) 05/10/2024    PLATELETCT 165 05/10/2024        Total time of the discharge process exceeds 36 minutes.

## 2024-05-13 NOTE — THERAPY
"Physical Therapy   Daily Treatment     Patient Name: Christoph Taylor  Age:  58 y.o., Sex:  male  Medical Record #: [id number]  Today's Date: 5/12/2024     Precautions  Precautions: Fall Risk;Other (See Comments) (AFO on L LE)    Assessment  Pt is able to work with PT in the PM but refused in the AM . Pt is able to get to the EOB with SPV only but  requires CGA with transfers and cues to maintain better upright thoracic posture. Pt states he has R foot pain with gait and he wonders \"if he fractured the R foot\"  Pt did not want to ambulate in the navarro today  due to hisR foot pain c/o . Pt will need to continue with PT to improve activity tolerance and gait endurance as pt has little support at home and poor motivation to get moving daily with PT.      Plan    Treatment Plan Status: Continue Current Treatment Plan  Type of Treatment: Bed Mobility, Gait Training, Neuro Re-Education / Balance, Self Care / Home Evaluation, Therapeutic Activities, Therapeutic Exercise  Treatment Frequency: 4 Times per Week  Treatment Duration: Until Therapy Goals Met    DC Equipment Recommendations: Front-Wheel Walker, Wheelchair  Discharge Recommendations: Recommend post-acute placement for additional physical therapy services prior to discharge home      Subjective  Pt  willing to work with PT and get out of bed to go to the bathroom . Pt did not want to remain up and sit at bedside .        Objective     05/12/24 1645   Bed Mobility    Supine to Sit Modified Independent   Sit to Supine Modified Independent   Scooting Supervised   Rolling Supervised   Gait Analysis   Gait Level Of Assist Contact Guard Assist   Assistive Device Front Wheel Walker   Distance (Feet) 20   # of Times Distance was Traveled 2   Deviation Decreased Heel Strike;Decreased Toe Off;Step To;Antalgic   # of Stairs Climbed 0   Weight Bearing Status FWB   Comments pt was willing to get up as he had to use the bathroom .   Functional Mobility   Sit to Stand Supervised "   Bed, Chair, Wheelchair Transfer Standby Assist   Toilet Transfers Standby Assist   Transfer Method Stand Step   Mobility from supine in bed to EOB, transfer with FWW amb 20 ' to bathroom and 20' back to return to bed,   Skilled Intervention Postural Facilitation;Tactile Cuing;Verbal Cuing;Other (See Comments)  (pt wanted to walk into the hallway but then resisted stating :his R foot was painful and he  points to the dorsum of the R foot near the 1-3 metatarsals and states this area is very sore.)   Activity Tolerance   Sitting in Chair 30   Sitting Edge of Bed 10   Standing 6 mins   Patient / Family Goals    Patient / Family Goal #1 PLOF   Short Term Goals    Goal Outcome # 1 Progressing as expected   Goal Outcome # 2 Progressing as expected   Goal Outcome # 3 Progressing as expected   Education Group   Education Provided Role of Physical Therapist   Role of Physical Therapist Patient Response Patient;Acceptance;Explanation;Demonstration;Verbal Demonstration;Action Demonstration   Gait Training Patient Response Patient;Acceptance;Explanation;Demonstration;Verbal Demonstration;Action Demonstration   Physical Therapy Treatment Plan   Physical Therapy Treatment Plan Continue Current Treatment Plan   Treatment Plan  Bed Mobility;Gait Training;Neuro Re-Education / Balance;Self Care / Home Evaluation;Therapeutic Activities;Therapeutic Exercise   Treatment Frequency 4 Times per Week   Duration Until Therapy Goals Met   Anticipated Discharge Equipment and Recommendations   DC Equipment Recommendations Front-Wheel Walker;Wheelchair   Discharge Recommendations Recommend post-acute placement for additional physical therapy services prior to discharge home

## 2024-05-13 NOTE — DISCHARGE INSTRUCTIONS
Diabetes Mellitus Basics    Diabetes mellitus, or diabetes, is a long-term (chronic) disease. It occurs when the body does not properly use sugar (glucose) that is released from food after you eat.  Diabetes mellitus may be caused by one or both of these problems:  Your pancreas does not make enough of a hormone called insulin.  Your body does not react in a normal way to the insulin that it makes.  Insulin lets glucose enter cells in your body. This gives you energy. If you have diabetes, glucose cannot get into cells. This causes high blood glucose (hyperglycemia).  How to treat and manage diabetes  You may need to take insulin or other diabetes medicines daily to keep your glucose in balance. If you are prescribed insulin, you will learn how to give yourself insulin by injection. You may need to adjust the amount of insulin you take based on the foods that you eat.  You will need to check your blood glucose levels using a glucose monitor as told by your health care provider. The readings can help determine if you have low or high blood glucose.  Generally, you should have these blood glucose levels:  Before meals (preprandial):  mg/dL (4.4-7.2 mmol/L).  After meals (postprandial): below 180 mg/dL (10 mmol/L).  Hemoglobin A1c (HbA1c) level: less than 7%.  Your health care provider will set treatment goals for you.  Keep all follow-up visits. This is important.  Follow these instructions at home:  Diabetes medicines  Take your diabetes medicines every day as told by your health care provider. List your diabetes medicines here:  Name of medicine: ______________________________  Amount (dose): _______________ Time (a.m./p.m.): _______________ Notes: ___________________________________  Name of medicine: ______________________________  Amount (dose): _______________ Time (a.m./p.m.): _______________ Notes: ___________________________________  Name of medicine: ______________________________  Amount (dose):  _______________ Time (a.m./p.m.): _______________ Notes: ___________________________________  Insulin  If you use insulin, list the types of insulin you use here:  Insulin type: ______________________________  Amount (dose): _______________ Time (a.m./p.m.): _______________Notes: ___________________________________  Insulin type: ______________________________  Amount (dose): _______________ Time (a.m./p.m.): _______________ Notes: ___________________________________  Insulin type: ______________________________  Amount (dose): _______________ Time (a.m./p.m.): _______________ Notes: ___________________________________  Insulin type: ______________________________  Amount (dose): _______________ Time (a.m./p.m.): _______________ Notes: ___________________________________  Insulin type: ______________________________  Amount (dose): _______________ Time (a.m./p.m.): _______________ Notes: ___________________________________  Managing blood glucose    Check your blood glucose levels using a glucose monitor as told by your health care provider.  Write down the times that you check your glucose levels here:  Time: _______________ Notes: ___________________________________  Time: _______________ Notes: ___________________________________  Time: _______________ Notes: ___________________________________  Time: _______________ Notes: ___________________________________  Time: _______________ Notes: ___________________________________  Time: _______________ Notes: ___________________________________    Low blood glucose  Low blood glucose (hypoglycemia) is when glucose is at or below 70 mg/dL (3.9 mmol/L). Symptoms may include:  Feeling:  Hungry.  Sweaty and clammy.  Irritable or easily upset.  Dizzy.  Sleepy.  Having:  A fast heartbeat.  A headache.  A change in your vision.  Numbness around the mouth, lips, or tongue.  Having trouble with:  Moving (coordination).  Sleeping.  Treating low blood glucose  To treat low blood  glucose, eat or drink something containing sugar right away. If you can think clearly and swallow safely, follow the 15:15 rule:  Take 15 grams of a fast-acting carb (carbohydrate), as told by your health care provider.  Some fast-acting carbs are:  Glucose tablets: take 3-4 tablets.  Hard candy: eat 3-5 pieces.  Fruit juice: drink 4 oz (120 mL).  Regular (not diet) soda: drink 4-6 oz (120-180 mL).  Honey or sugar: eat 1 Tbsp (15 mL).  Check your blood glucose levels 15 minutes after you take the carb.  If your glucose is still at or below 70 mg/dL (3.9 mmol/L), take 15 grams of a carb again.  If your glucose does not go above 70 mg/dL (3.9 mmol/L) after 3 tries, get help right away.  After your glucose goes back to normal, eat a meal or a snack within 1 hour.  Treating very low blood glucose  If your glucose is at or below 54 mg/dL (3 mmol/L), you have very low blood glucose (severe hypoglycemia).  This is an emergency. Do not wait to see if the symptoms will go away. Get medical help right away. Call your local emergency services (911 in the [place] Do not drive yourself to the hospital.  Questions to ask your health care provider  Should I talk with a diabetes educator?  What equipment will I need to care for myself at home?  What diabetes medicines do I need? When should I take them?  How often do I need to check my blood glucose levels?  What number can I call if I have questions?  When is my follow-up visit?  Where can I find a support group for people with diabetes?  Where to find more information  American Diabetes Association: www.diabetes.org  Association of Diabetes Care and Education Specialists: www.diabeteseducator.org  Contact a health care provider if:  Your blood glucose is at or above 240 mg/dL (13.3 mmol/L) for 2 days in a row.  You have been sick or have had a fever for 2 days or more, and you are not getting better.  You have any of these problems for more than 6 hours:  You cannot eat or  drink.  You feel nauseous.  You vomit.  You have diarrhea.  Get help right away if:  Your blood glucose is lower than 54 mg/dL (3 mmol/L).  You get confused.  You have trouble thinking clearly.  You have trouble breathing.  These symptoms may represent a serious problem that is an emergency. Do not wait to see if the symptoms will go away. Get medical help right away. Call your local emergency services (911 in the [place] Do not drive yourself to the hospital.  Summary  Diabetes mellitus is a chronic disease that occurs when the body does not properly use sugar (glucose) that is released from food after you eat.  Take insulin and diabetes medicines as told.  Check your blood glucose every day, as often as told.  Keep all follow-up visits. This is important.  This information is not intended to replace advice given to you by your health care provider. Make sure you discuss any questions you have with your health care provider.  Document Revised: 04/20/2021 Document Reviewed: 04/20/2021  [employer] Patient Education © 2023 [employer]

## 2024-05-13 NOTE — CARE PLAN
The patient is Stable - Low risk of patient condition declining or worsening    Shift Goals  Clinical Goals: monitor blood sugar, free from fall  Patient Goals: sleep at least 8 hours  Family Goals: vicente    Progress made toward(s) clinical / shift goals:  Blood sugar kept monitored. Call light within reach. Fall precautions in placed. Hourly rounding and plan of care in progress. Skin check done on his left lower leg to check for integrity, skin intact.     Patient is not progressing towards the following goals:

## 2024-05-20 ENCOUNTER — HOSPITAL ENCOUNTER (INPATIENT)
Facility: MEDICAL CENTER | Age: 59
LOS: 3 days | DRG: 388 | End: 2024-05-23
Attending: EMERGENCY MEDICINE | Admitting: STUDENT IN AN ORGANIZED HEALTH CARE EDUCATION/TRAINING PROGRAM
Payer: COMMERCIAL

## 2024-05-20 ENCOUNTER — TELEMEDICINE (OUTPATIENT)
Dept: MEDICAL GROUP | Facility: LAB | Age: 59
End: 2024-05-20
Payer: COMMERCIAL

## 2024-05-20 ENCOUNTER — APPOINTMENT (OUTPATIENT)
Dept: RADIOLOGY | Facility: MEDICAL CENTER | Age: 59
DRG: 388 | End: 2024-05-20
Attending: EMERGENCY MEDICINE
Payer: COMMERCIAL

## 2024-05-20 DIAGNOSIS — K31.84 GASTROPARESIS: ICD-10-CM

## 2024-05-20 DIAGNOSIS — R09.02 HYPOXIA: ICD-10-CM

## 2024-05-20 DIAGNOSIS — M54.6 CHRONIC MIDLINE THORACIC BACK PAIN: ICD-10-CM

## 2024-05-20 DIAGNOSIS — K29.00 ACUTE SUPERFICIAL GASTRITIS WITHOUT HEMORRHAGE: ICD-10-CM

## 2024-05-20 DIAGNOSIS — R11.2 NAUSEA AND VOMITING, UNSPECIFIED VOMITING TYPE: ICD-10-CM

## 2024-05-20 DIAGNOSIS — K20.90 ESOPHAGITIS: ICD-10-CM

## 2024-05-20 DIAGNOSIS — G89.29 CHRONIC MIDLINE THORACIC BACK PAIN: ICD-10-CM

## 2024-05-20 DIAGNOSIS — R73.9 HYPERGLYCEMIA: ICD-10-CM

## 2024-05-20 PROBLEM — J96.01 ACUTE HYPOXIC RESPIRATORY FAILURE (HCC): Status: ACTIVE | Noted: 2024-05-20

## 2024-05-20 PROBLEM — D72.829 LEUKOCYTOSIS: Status: ACTIVE | Noted: 2024-05-20

## 2024-05-20 PROBLEM — R53.81 DEBILITY: Status: ACTIVE | Noted: 2024-05-20

## 2024-05-20 LAB
ALBUMIN SERPL BCP-MCNC: 3.4 G/DL (ref 3.2–4.9)
ALBUMIN/GLOB SERPL: 1.3 G/DL
ALP SERPL-CCNC: 82 U/L (ref 30–99)
ALT SERPL-CCNC: 22 U/L (ref 2–50)
ANION GAP SERPL CALC-SCNC: 9 MMOL/L (ref 7–16)
AST SERPL-CCNC: 11 U/L (ref 12–45)
B-OH-BUTYR SERPL-MCNC: 0.32 MMOL/L (ref 0.02–0.27)
BASE EXCESS BLDV CALC-SCNC: 0 MMOL/L
BASOPHILS # BLD AUTO: 0.1 % (ref 0–1.8)
BASOPHILS # BLD: 0.01 K/UL (ref 0–0.12)
BILIRUB SERPL-MCNC: 0.3 MG/DL (ref 0.1–1.5)
BODY TEMPERATURE: 36.2 CENTIGRADE
BUN SERPL-MCNC: 23 MG/DL (ref 8–22)
CALCIUM ALBUM COR SERPL-MCNC: 9 MG/DL (ref 8.5–10.5)
CALCIUM SERPL-MCNC: 8.5 MG/DL (ref 8.5–10.5)
CHLORIDE SERPL-SCNC: 95 MMOL/L (ref 96–112)
CO2 SERPL-SCNC: 28 MMOL/L (ref 20–33)
CREAT SERPL-MCNC: 0.6 MG/DL (ref 0.5–1.4)
EKG IMPRESSION: NORMAL
EOSINOPHIL # BLD AUTO: 0.12 K/UL (ref 0–0.51)
EOSINOPHIL NFR BLD: 1.1 % (ref 0–6.9)
ERYTHROCYTE [DISTWIDTH] IN BLOOD BY AUTOMATED COUNT: 39.2 FL (ref 35.9–50)
EST. AVERAGE GLUCOSE BLD GHB EST-MCNC: 312 MG/DL
GFR SERPLBLD CREATININE-BSD FMLA CKD-EPI: 111 ML/MIN/1.73 M 2
GLOBULIN SER CALC-MCNC: 2.6 G/DL (ref 1.9–3.5)
GLUCOSE BLD STRIP.AUTO-MCNC: 393 MG/DL (ref 65–99)
GLUCOSE SERPL-MCNC: 490 MG/DL (ref 65–99)
HBA1C MFR BLD: 12.5 % (ref 4–5.6)
HCO3 BLDV-SCNC: 25 MMOL/L (ref 24–28)
HCT VFR BLD AUTO: 34.7 % (ref 42–52)
HGB BLD-MCNC: 12 G/DL (ref 14–18)
IMM GRANULOCYTES # BLD AUTO: 0.05 K/UL (ref 0–0.11)
IMM GRANULOCYTES NFR BLD AUTO: 0.4 % (ref 0–0.9)
INHALED O2 FLOW RATE: ABNORMAL L/MIN
LIPASE SERPL-CCNC: 30 U/L (ref 11–82)
LYMPHOCYTES # BLD AUTO: 0.93 K/UL (ref 1–4.8)
LYMPHOCYTES NFR BLD: 8.2 % (ref 22–41)
MCH RBC QN AUTO: 28.2 PG (ref 27–33)
MCHC RBC AUTO-ENTMCNC: 34.6 G/DL (ref 32.3–36.5)
MCV RBC AUTO: 81.5 FL (ref 81.4–97.8)
MONOCYTES # BLD AUTO: 0.49 K/UL (ref 0–0.85)
MONOCYTES NFR BLD AUTO: 4.3 % (ref 0–13.4)
NEUTROPHILS # BLD AUTO: 9.74 K/UL (ref 1.82–7.42)
NEUTROPHILS NFR BLD: 85.9 % (ref 44–72)
NRBC # BLD AUTO: 0 K/UL
NRBC BLD-RTO: 0 /100 WBC (ref 0–0.2)
NT-PROBNP SERPL IA-MCNC: 340 PG/ML (ref 0–125)
PCO2 BLDV: 40.3 MMHG (ref 41–51)
PH BLDV: 7.41 [PH] (ref 7.31–7.45)
PLATELET # BLD AUTO: 257 K/UL (ref 164–446)
PMV BLD AUTO: 8.2 FL (ref 9–12.9)
PO2 BLDV: 94.7 MMHG (ref 25–40)
POTASSIUM SERPL-SCNC: 4.6 MMOL/L (ref 3.6–5.5)
PROT SERPL-MCNC: 6 G/DL (ref 6–8.2)
RBC # BLD AUTO: 4.26 M/UL (ref 4.7–6.1)
SAO2 % BLDV: 96.2 %
SODIUM SERPL-SCNC: 132 MMOL/L (ref 135–145)
TROPONIN T SERPL-MCNC: 27 NG/L (ref 6–19)
WBC # BLD AUTO: 11.3 K/UL (ref 4.8–10.8)

## 2024-05-20 PROCEDURE — 99223 1ST HOSP IP/OBS HIGH 75: CPT | Performed by: STUDENT IN AN ORGANIZED HEALTH CARE EDUCATION/TRAINING PROGRAM

## 2024-05-20 PROCEDURE — 99214 OFFICE O/P EST MOD 30 MIN: CPT | Mod: 95 | Performed by: NURSE PRACTITIONER

## 2024-05-20 RX ORDER — METOCLOPRAMIDE 10 MG/1
10 TABLET ORAL 3 TIMES DAILY PRN
Status: DISCONTINUED | OUTPATIENT
Start: 2024-05-20 | End: 2024-05-20

## 2024-05-20 RX ORDER — METOCLOPRAMIDE HYDROCHLORIDE 5 MG/ML
10 INJECTION INTRAMUSCULAR; INTRAVENOUS ONCE
Status: COMPLETED | OUTPATIENT
Start: 2024-05-20 | End: 2024-05-20

## 2024-05-20 RX ORDER — ONDANSETRON 2 MG/ML
4 INJECTION INTRAMUSCULAR; INTRAVENOUS EVERY 4 HOURS PRN
Status: DISCONTINUED | OUTPATIENT
Start: 2024-05-20 | End: 2024-05-23 | Stop reason: HOSPADM

## 2024-05-20 RX ORDER — SODIUM CHLORIDE, SODIUM LACTATE, POTASSIUM CHLORIDE, CALCIUM CHLORIDE 600; 310; 30; 20 MG/100ML; MG/100ML; MG/100ML; MG/100ML
INJECTION, SOLUTION INTRAVENOUS CONTINUOUS
Status: ACTIVE | OUTPATIENT
Start: 2024-05-20 | End: 2024-05-21

## 2024-05-20 RX ORDER — ENOXAPARIN SODIUM 100 MG/ML
40 INJECTION SUBCUTANEOUS DAILY
Status: DISCONTINUED | OUTPATIENT
Start: 2024-05-20 | End: 2024-05-23 | Stop reason: HOSPADM

## 2024-05-20 RX ORDER — SODIUM CHLORIDE, SODIUM LACTATE, POTASSIUM CHLORIDE, CALCIUM CHLORIDE 600; 310; 30; 20 MG/100ML; MG/100ML; MG/100ML; MG/100ML
1000 INJECTION, SOLUTION INTRAVENOUS ONCE
Status: COMPLETED | OUTPATIENT
Start: 2024-05-20 | End: 2024-05-20

## 2024-05-20 RX ORDER — ONDANSETRON 4 MG/1
4 TABLET, ORALLY DISINTEGRATING ORAL EVERY 4 HOURS PRN
Status: DISCONTINUED | OUTPATIENT
Start: 2024-05-20 | End: 2024-05-23 | Stop reason: HOSPADM

## 2024-05-20 RX ORDER — ASPIRIN 81 MG/1
81 TABLET ORAL EVERY EVENING
Status: DISCONTINUED | OUTPATIENT
Start: 2024-05-20 | End: 2024-05-23 | Stop reason: HOSPADM

## 2024-05-20 RX ORDER — ACETAMINOPHEN 325 MG/1
650 TABLET ORAL EVERY 6 HOURS PRN
Status: DISCONTINUED | OUTPATIENT
Start: 2024-05-20 | End: 2024-05-21

## 2024-05-20 RX ORDER — FAMOTIDINE 20 MG/1
20 TABLET, FILM COATED ORAL 2 TIMES DAILY
Status: DISCONTINUED | OUTPATIENT
Start: 2024-05-20 | End: 2024-05-21

## 2024-05-20 RX ORDER — PROMETHAZINE HYDROCHLORIDE 25 MG/1
12.5-25 SUPPOSITORY RECTAL EVERY 4 HOURS PRN
Status: DISCONTINUED | OUTPATIENT
Start: 2024-05-20 | End: 2024-05-23 | Stop reason: HOSPADM

## 2024-05-20 RX ORDER — PROCHLORPERAZINE EDISYLATE 5 MG/ML
5-10 INJECTION INTRAMUSCULAR; INTRAVENOUS EVERY 4 HOURS PRN
Status: DISCONTINUED | OUTPATIENT
Start: 2024-05-20 | End: 2024-05-23 | Stop reason: HOSPADM

## 2024-05-20 RX ORDER — PROMETHAZINE HYDROCHLORIDE 25 MG/1
12.5-25 TABLET ORAL EVERY 4 HOURS PRN
Status: DISCONTINUED | OUTPATIENT
Start: 2024-05-20 | End: 2024-05-23 | Stop reason: HOSPADM

## 2024-05-20 RX ORDER — DEXTROSE MONOHYDRATE 25 G/50ML
25 INJECTION, SOLUTION INTRAVENOUS
Status: DISCONTINUED | OUTPATIENT
Start: 2024-05-20 | End: 2024-05-23 | Stop reason: HOSPADM

## 2024-05-20 RX ORDER — OXYCODONE HYDROCHLORIDE 5 MG/1
5 TABLET ORAL EVERY 4 HOURS PRN
Status: ON HOLD | COMMUNITY
End: 2024-05-23

## 2024-05-20 RX ORDER — MORPHINE SULFATE 4 MG/ML
4 INJECTION INTRAVENOUS ONCE
Status: COMPLETED | OUTPATIENT
Start: 2024-05-20 | End: 2024-05-20

## 2024-05-20 RX ADMIN — ASPIRIN 81 MG: 81 TABLET, COATED ORAL at 21:32

## 2024-05-20 RX ADMIN — ACETAMINOPHEN 650 MG: 325 TABLET, FILM COATED ORAL at 21:31

## 2024-05-20 RX ADMIN — METOCLOPRAMIDE 10 MG: 5 INJECTION, SOLUTION INTRAMUSCULAR; INTRAVENOUS at 17:51

## 2024-05-20 RX ADMIN — SODIUM CHLORIDE, POTASSIUM CHLORIDE, SODIUM LACTATE AND CALCIUM CHLORIDE 1000 ML: 600; 310; 30; 20 INJECTION, SOLUTION INTRAVENOUS at 17:52

## 2024-05-20 RX ADMIN — FAMOTIDINE 20 MG: 20 TABLET, FILM COATED ORAL at 21:32

## 2024-05-20 RX ADMIN — ENOXAPARIN SODIUM 40 MG: 100 INJECTION SUBCUTANEOUS at 21:31

## 2024-05-20 RX ADMIN — INSULIN GLARGINE-YFGN 20 UNITS: 100 INJECTION, SOLUTION SUBCUTANEOUS at 21:48

## 2024-05-20 RX ADMIN — INSULIN HUMAN 12 UNITS: 100 INJECTION, SOLUTION PARENTERAL at 21:50

## 2024-05-20 RX ADMIN — MORPHINE SULFATE 4 MG: 4 INJECTION INTRAVENOUS at 17:50

## 2024-05-20 ASSESSMENT — ENCOUNTER SYMPTOMS
HEADACHES: 0
CONSTIPATION: 0
SHORTNESS OF BREATH: 0
ORTHOPNEA: 0
EYE PAIN: 0
VOMITING: 1
NECK PAIN: 0
CHILLS: 0
SINUS PAIN: 0
DIARRHEA: 0
BACK PAIN: 1
DIZZINESS: 0
NAUSEA: 1
FEVER: 0
ABDOMINAL PAIN: 1
DOUBLE VISION: 0
NERVOUS/ANXIOUS: 0
COUGH: 0
HEMOPTYSIS: 0
PHOTOPHOBIA: 0
EYE DISCHARGE: 0
SPUTUM PRODUCTION: 0
HALLUCINATIONS: 0
INSOMNIA: 0
BLOOD IN STOOL: 0
PALPITATIONS: 0

## 2024-05-20 ASSESSMENT — FIBROSIS 4 INDEX
FIB4 SCORE: 1.14
FIB4 SCORE: 0.54

## 2024-05-20 ASSESSMENT — LIFESTYLE VARIABLES: SUBSTANCE_ABUSE: 0

## 2024-05-20 NOTE — PROGRESS NOTES
"Virtual Visit: Established Patient   This visit was conducted via Zoom using secure and encrypted videoconferencing technology.   The patient was in their home in the [facility].    The patient's identity was confirmed and verbal consent was obtained for this virtual visit.     Subjective:   CC:   Chief Complaint   Patient presents with    Hospital Follow-up     Christoph Taylor is a 58 y.o. male presenting for evaluation and management of:  History of Present Illness  The patient presents via virtual visit for a hospital follow-up.    Hospital discharge follow up  Patient discharged from hospital on 5/13/2024.  Per hospital note:  \"Christoph Taylor has past medical history includes type 2 diabetes.  The patient has had multiple complications including diabetic foot infection.  The patient presented to the emergency room on 5/6/2024 with complaints of abdominal pain and high blood sugar.  The patient was found to be in hyper osmolar state.  He was admitted to the hospital for insulin and IV fluids.  Patient's metabolic parameters gradually improved.  He continues to have some hyperglycemia but overall his blood sugar control is reasonable.  Patient continues to have some abdominal pain gastric emptying study was consistent with gastroparesis.  The patient abdominal symptoms have actually improved significantly and he is tolerating regular diet at this time.  Skilled nursing placement was considered due to the patient's history of poor compliance and need for physical therapy.  Discussed with the patient at length today, he would prefer to be discharged home.  Home health has been ordered.\"  The patient reports a general feeling of malaise, characterized by back pain, gastrointestinal discomfort, and fecal incontinence, particularly postprandial. Despite denying any episodes of vomiting, he acknowledges feelings of dehydration. His symptoms are reminiscent of his previous hospital admission, including abdominal " pain and diarrhea. Currently, he is experiencing severe diarrhea, necessitating the use of a diaper, which he finds unhelpful as there is too much stool. Stool testing was not conducted during his hospital stay. He hypothesizes that his fecal incontinence may be related to his back pain. Since his discharge, he has experienced two falls, with his legs buckling during ambulation. He expresses apprehension about transportation and eating due to his condition. His sister drove from [place] to collect his prescribed medications, but he is uncertain what he is taking. His current medication regimen includes three medications, one for pre-meal and post-meal medication, and another for acid reflux. He expresses concern about the necessity of a return to the emergency room due to his symptoms. He struggles with self-care, struggles with simple tasks, and feels weak. He is currently not on any analgesics for his back pain.    Gastric emptying study results:  IMPRESSION:  1. Marked gastroparesis, with no significant gastric emptying after 90 minutes.  2. Multiple episodes of gastroesophageal reflux during the 90 minutes of imaging.    ROS   As documented in history of present illness above    Current medicines (including changes today)  Current Outpatient Medications   Medication Sig Dispense Refill    insulin glargine (LANTUS SOLOSTAR) 100 UNIT/ML Solution Pen-injector injection Inject 20 Units under the skin 2 times a day. 15 mL 0    metoclopramide (REGLAN) 10 MG Tab Take 1 Tablet by mouth 3 times a day as needed (nausea vomiting). 30 Tablet 0    famotidine (PEPCID) 20 MG Tab Take 1 Tablet by mouth 2 times a day. 30 Tablet 0    insulin glargine (LANTUS) 100 UNIT/ML SC SOLN Inject 10-25 Units under the skin 2 times a day. Pt reports that this medication is prescribed 20 units BID, pt reports that he does a sliding scale ranging from 10-25 units BID      aspirin 81 MG EC tablet Take 81 mg by mouth every evening.       multivitamin Tab Take 1 Tablet by mouth every evening.       No current facility-administered medications for this visit.       Patient Active Problem List    Diagnosis Date Noted    Insomnia 05/08/2024    Type 2 diabetes mellitus with hyperosmolar nonketotic hyperglycemia (HCC) 05/06/2024    Normochromic normocytic anemia 05/06/2024    Full code status 05/06/2024    Constipation 01/27/2024    Elbow abrasion 01/27/2024    Functional gait abnormality 01/27/2024    Hypotension 01/26/2024    Acute cystitis without hematuria 01/26/2024    Urinary retention 01/26/2024    Diabetic toe ulcer (HCC) 01/26/2024    Hyperglycemia 08/03/2023    Right hip pain 08/03/2023    Hx of Clostridium difficile infection 08/03/2023    Enteritis 06/10/2023    Elevated troponin 06/10/2023    Syncope 06/09/2023    Acute metabolic encephalopathy 06/09/2023    History of osteomyelitis of right hand (HCC) 06/08/2023    AP (abdominal pain) 06/08/2023    Abdominal pain 03/11/2023    Chronic midline thoracic back pain 10/05/2022    Hyponatremia 07/28/2022    Diabetes mellitus type 2, insulin dependent, poorly controlled with marked hyperglycemia (HCC) 07/28/2022    Uncontrolled type 2 diabetes mellitus with hyperglycemia (HCC) 03/18/2022    Dyslipidemia 03/08/2022    Diabetic ketoacidosis without coma (HCC) 11/07/2021    Metabolic acidosis, increased anion gap 11/07/2021    Noncompliance with medications 11/07/2021        Objective:   There were no vitals taken for this visit.    Physical Exam:  Constitutional: Alert.  Skin: No rashes in visible areas.  Eye: Round. Conjunctiva clear, lids normal. No icterus.   ENMT: Phonation normal.  Respiratory: Unlabored respiratory effort, no cough or audible wheeze  Psych: Alert and oriented x3, normal affect and mood.     Assessment and Plan:   The following treatment plan was discussed:   Assessment & Plan  1. Post-hospitalization follow-up.  Unable to assess patient due to virtual appointment. Patient  reports that he is unable to get to pharmacy to  an additional prescriptions. He also reports that he is currently unable to care for himself. Discussed that he will likely need to go to the ER and that a SNF is likely needed due to level of care that he will require.  He does not have a way to get to the ER, will likely be calling EMS.

## 2024-05-21 PROBLEM — R10.12 INTRACTABLE LEFT UPPER QUADRANT ABDOMINAL PAIN: Status: ACTIVE | Noted: 2024-05-21

## 2024-05-21 PROBLEM — K29.00 ACUTE SUPERFICIAL GASTRITIS WITHOUT HEMORRHAGE: Status: ACTIVE | Noted: 2024-05-21

## 2024-05-21 PROBLEM — K56.49: Status: ACTIVE | Noted: 2024-05-21

## 2024-05-21 LAB
ALBUMIN SERPL BCP-MCNC: 3.2 G/DL (ref 3.2–4.9)
ALBUMIN/GLOB SERPL: 1.5 G/DL
ALP SERPL-CCNC: 75 U/L (ref 30–99)
ALT SERPL-CCNC: 21 U/L (ref 2–50)
ANION GAP SERPL CALC-SCNC: 10 MMOL/L (ref 7–16)
AST SERPL-CCNC: 12 U/L (ref 12–45)
BILIRUB SERPL-MCNC: 0.3 MG/DL (ref 0.1–1.5)
BUN SERPL-MCNC: 21 MG/DL (ref 8–22)
C DIFF DNA SPEC QL NAA+PROBE: NEGATIVE
C DIFF TOX GENS STL QL NAA+PROBE: NEGATIVE
CALCIUM ALBUM COR SERPL-MCNC: 8.8 MG/DL (ref 8.5–10.5)
CALCIUM SERPL-MCNC: 8.2 MG/DL (ref 8.5–10.5)
CHLORIDE SERPL-SCNC: 101 MMOL/L (ref 96–112)
CO2 SERPL-SCNC: 27 MMOL/L (ref 20–33)
CREAT SERPL-MCNC: 0.64 MG/DL (ref 0.5–1.4)
ERYTHROCYTE [DISTWIDTH] IN BLOOD BY AUTOMATED COUNT: 39.8 FL (ref 35.9–50)
GFR SERPLBLD CREATININE-BSD FMLA CKD-EPI: 108 ML/MIN/1.73 M 2
GLOBULIN SER CALC-MCNC: 2.2 G/DL (ref 1.9–3.5)
GLUCOSE BLD STRIP.AUTO-MCNC: 104 MG/DL (ref 65–99)
GLUCOSE BLD STRIP.AUTO-MCNC: 173 MG/DL (ref 65–99)
GLUCOSE BLD STRIP.AUTO-MCNC: 228 MG/DL (ref 65–99)
GLUCOSE SERPL-MCNC: 357 MG/DL (ref 65–99)
HCT VFR BLD AUTO: 33.4 % (ref 42–52)
HGB BLD-MCNC: 11.3 G/DL (ref 14–18)
MCH RBC QN AUTO: 27.8 PG (ref 27–33)
MCHC RBC AUTO-ENTMCNC: 33.8 G/DL (ref 32.3–36.5)
MCV RBC AUTO: 82.3 FL (ref 81.4–97.8)
PLATELET # BLD AUTO: 274 K/UL (ref 164–446)
PMV BLD AUTO: 8.6 FL (ref 9–12.9)
POTASSIUM SERPL-SCNC: 4.3 MMOL/L (ref 3.6–5.5)
PROT SERPL-MCNC: 5.4 G/DL (ref 6–8.2)
RBC # BLD AUTO: 4.06 M/UL (ref 4.7–6.1)
SODIUM SERPL-SCNC: 138 MMOL/L (ref 135–145)
WBC # BLD AUTO: 10.5 K/UL (ref 4.8–10.8)

## 2024-05-21 PROCEDURE — 99233 SBSQ HOSP IP/OBS HIGH 50: CPT | Performed by: STUDENT IN AN ORGANIZED HEALTH CARE EDUCATION/TRAINING PROGRAM

## 2024-05-21 RX ORDER — POLYETHYLENE GLYCOL 3350 17 G/17G
1 POWDER, FOR SOLUTION ORAL 2 TIMES DAILY
Status: DISCONTINUED | OUTPATIENT
Start: 2024-05-21 | End: 2024-05-21

## 2024-05-21 RX ORDER — OXYCODONE HYDROCHLORIDE 10 MG/1
10 TABLET ORAL
Status: DISCONTINUED | OUTPATIENT
Start: 2024-05-21 | End: 2024-05-23 | Stop reason: HOSPADM

## 2024-05-21 RX ORDER — IBUPROFEN 800 MG/1
800 TABLET ORAL 3 TIMES DAILY PRN
Status: DISCONTINUED | OUTPATIENT
Start: 2024-05-25 | End: 2024-05-23 | Stop reason: HOSPADM

## 2024-05-21 RX ORDER — POLYETHYLENE GLYCOL 3350 17 G/17G
1 POWDER, FOR SOLUTION ORAL 3 TIMES DAILY
Status: DISCONTINUED | OUTPATIENT
Start: 2024-05-21 | End: 2024-05-23 | Stop reason: HOSPADM

## 2024-05-21 RX ORDER — PANTOPRAZOLE SODIUM 40 MG/10ML
40 INJECTION, POWDER, LYOPHILIZED, FOR SOLUTION INTRAVENOUS 2 TIMES DAILY
Status: DISCONTINUED | OUTPATIENT
Start: 2024-05-21 | End: 2024-05-23 | Stop reason: HOSPADM

## 2024-05-21 RX ORDER — OXYCODONE HYDROCHLORIDE 5 MG/1
5 TABLET ORAL
Status: DISCONTINUED | OUTPATIENT
Start: 2024-05-21 | End: 2024-05-23 | Stop reason: HOSPADM

## 2024-05-21 RX ORDER — ACETAMINOPHEN 500 MG
1000 TABLET ORAL EVERY 6 HOURS
Qty: 40 TABLET | Refills: 0 | Status: DISCONTINUED | OUTPATIENT
Start: 2024-05-21 | End: 2024-05-23 | Stop reason: HOSPADM

## 2024-05-21 RX ORDER — DOCUSATE SODIUM 100 MG/1
100 CAPSULE, LIQUID FILLED ORAL 2 TIMES DAILY
Status: DISCONTINUED | OUTPATIENT
Start: 2024-05-22 | End: 2024-05-23 | Stop reason: HOSPADM

## 2024-05-21 RX ORDER — INSULIN LISPRO 100 [IU]/ML
1-6 INJECTION, SOLUTION INTRAVENOUS; SUBCUTANEOUS
Status: DISCONTINUED | OUTPATIENT
Start: 2024-05-21 | End: 2024-05-23 | Stop reason: HOSPADM

## 2024-05-21 RX ORDER — INSULIN LISPRO 100 [IU]/ML
8 INJECTION, SOLUTION INTRAVENOUS; SUBCUTANEOUS ONCE
Status: DISCONTINUED | OUTPATIENT
Start: 2024-05-21 | End: 2024-05-21

## 2024-05-21 RX ORDER — SODIUM CHLORIDE, SODIUM LACTATE, POTASSIUM CHLORIDE, AND CALCIUM CHLORIDE .6; .31; .03; .02 G/100ML; G/100ML; G/100ML; G/100ML
500 INJECTION, SOLUTION INTRAVENOUS ONCE
Status: COMPLETED | OUTPATIENT
Start: 2024-05-21 | End: 2024-05-21

## 2024-05-21 RX ORDER — MORPHINE SULFATE 4 MG/ML
4 INJECTION INTRAVENOUS
Status: DISCONTINUED | OUTPATIENT
Start: 2024-05-21 | End: 2024-05-23 | Stop reason: HOSPADM

## 2024-05-21 RX ORDER — KETOROLAC TROMETHAMINE 15 MG/ML
15 INJECTION, SOLUTION INTRAMUSCULAR; INTRAVENOUS EVERY 6 HOURS
Qty: 12 ML | Refills: 0 | Status: DISCONTINUED | OUTPATIENT
Start: 2024-05-21 | End: 2024-05-23 | Stop reason: HOSPADM

## 2024-05-21 RX ORDER — ACETAMINOPHEN 500 MG
1000 TABLET ORAL EVERY 6 HOURS PRN
Status: DISCONTINUED | OUTPATIENT
Start: 2024-05-27 | End: 2024-05-23 | Stop reason: HOSPADM

## 2024-05-21 RX ADMIN — POLYETHYLENE GLYCOL 3350 1 PACKET: 17 POWDER, FOR SOLUTION ORAL at 21:35

## 2024-05-21 RX ADMIN — SODIUM CHLORIDE, POTASSIUM CHLORIDE, SODIUM LACTATE AND CALCIUM CHLORIDE: 600; 310; 30; 20 INJECTION, SOLUTION INTRAVENOUS at 00:13

## 2024-05-21 RX ADMIN — LIDOCAINE HYDROCHLORIDE 30 ML: 20 SOLUTION OROPHARYNGEAL at 12:28

## 2024-05-21 RX ADMIN — PANTOPRAZOLE SODIUM 40 MG: 40 INJECTION, POWDER, FOR SOLUTION INTRAVENOUS at 12:34

## 2024-05-21 RX ADMIN — SODIUM CHLORIDE, POTASSIUM CHLORIDE, SODIUM LACTATE AND CALCIUM CHLORIDE 500 ML: 600; 310; 30; 20 INJECTION, SOLUTION INTRAVENOUS at 08:12

## 2024-05-21 RX ADMIN — PANTOPRAZOLE SODIUM 40 MG: 40 INJECTION, POWDER, FOR SOLUTION INTRAVENOUS at 21:34

## 2024-05-21 RX ADMIN — ENOXAPARIN SODIUM 40 MG: 100 INJECTION SUBCUTANEOUS at 18:14

## 2024-05-21 RX ADMIN — INSULIN GLARGINE-YFGN 20 UNITS: 100 INJECTION, SOLUTION SUBCUTANEOUS at 18:11

## 2024-05-21 RX ADMIN — KETOROLAC TROMETHAMINE 15 MG: 15 INJECTION, SOLUTION INTRAMUSCULAR; INTRAVENOUS at 21:34

## 2024-05-21 RX ADMIN — ACETAMINOPHEN 1000 MG: 500 TABLET, FILM COATED ORAL at 21:35

## 2024-05-21 RX ADMIN — FAMOTIDINE 20 MG: 20 TABLET, FILM COATED ORAL at 09:29

## 2024-05-21 RX ADMIN — ASPIRIN 81 MG: 81 TABLET, COATED ORAL at 18:14

## 2024-05-21 RX ADMIN — INSULIN LISPRO 2 UNITS: 100 INJECTION, SOLUTION INTRAVENOUS; SUBCUTANEOUS at 12:31

## 2024-05-21 RX ADMIN — INSULIN GLARGINE-YFGN 20 UNITS: 100 INJECTION, SOLUTION SUBCUTANEOUS at 06:13

## 2024-05-21 RX ADMIN — INSULIN LISPRO 1 UNITS: 100 INJECTION, SOLUTION INTRAVENOUS; SUBCUTANEOUS at 18:12

## 2024-05-21 ASSESSMENT — COGNITIVE AND FUNCTIONAL STATUS - GENERAL
HELP NEEDED FOR BATHING: A LITTLE
TOILETING: A LITTLE
MOBILITY SCORE: 16
SUGGESTED CMS G CODE MODIFIER DAILY ACTIVITY: CJ
SUGGESTED CMS G CODE MODIFIER MOBILITY: CJ
CLIMB 3 TO 5 STEPS WITH RAILING: TOTAL
MOVING FROM LYING ON BACK TO SITTING ON SIDE OF FLAT BED: A LITTLE
DAILY ACTIVITIY SCORE: 20
WALKING IN HOSPITAL ROOM: TOTAL
MOVING TO AND FROM BED TO CHAIR: A LITTLE
DRESSING REGULAR LOWER BODY CLOTHING: A LITTLE
CLIMB 3 TO 5 STEPS WITH RAILING: A LITTLE
SUGGESTED CMS G CODE MODIFIER MOBILITY: CK
DRESSING REGULAR UPPER BODY CLOTHING: A LITTLE
STANDING UP FROM CHAIR USING ARMS: A LOT
WALKING IN HOSPITAL ROOM: A LITTLE
MOBILITY SCORE: 20

## 2024-05-21 ASSESSMENT — LIFESTYLE VARIABLES: ALCOHOL_USE: NO

## 2024-05-21 ASSESSMENT — FIBROSIS 4 INDEX
FIB4 SCORE: 0.56
FIB4 SCORE: 0.56

## 2024-05-21 ASSESSMENT — PAIN DESCRIPTION - PAIN TYPE
TYPE: CHRONIC PAIN
TYPE: ACUTE PAIN

## 2024-05-21 ASSESSMENT — ENCOUNTER SYMPTOMS
COUGH: 0
FEVER: 0
MYALGIAS: 0
DIARRHEA: 1
PALPITATIONS: 0
SHORTNESS OF BREATH: 0
ABDOMINAL PAIN: 0
VOMITING: 0
DIZZINESS: 0
NAUSEA: 1
HEADACHES: 0
CHILLS: 0
WEAKNESS: 1

## 2024-05-21 ASSESSMENT — SOCIAL DETERMINANTS OF HEALTH (SDOH)
WITHIN THE LAST YEAR, HAVE YOU BEEN AFRAID OF YOUR PARTNER OR EX-PARTNER?: NO
WITHIN THE LAST YEAR, HAVE YOU BEEN HUMILIATED OR EMOTIONALLY ABUSED IN OTHER WAYS BY YOUR PARTNER OR EX-PARTNER?: NO
WITHIN THE LAST YEAR, HAVE TO BEEN RAPED OR FORCED TO HAVE ANY KIND OF SEXUAL ACTIVITY BY YOUR PARTNER OR EX-PARTNER?: NO
WITHIN THE LAST YEAR, HAVE YOU BEEN KICKED, HIT, SLAPPED, OR OTHERWISE PHYSICALLY HURT BY YOUR PARTNER OR EX-PARTNER?: NO

## 2024-05-21 ASSESSMENT — GAIT ASSESSMENTS: GAIT LEVEL OF ASSIST: UNABLE TO PARTICIPATE

## 2024-05-21 NOTE — THERAPY
Physical Therapy   Initial Evaluation     Patient Name: Christoph Taylor  Age:  58 y.o., Sex:  male  Medical Record #: [id number]  Today's Date: 5/21/2024     Precautions  Precautions: Fall Risk  Comments: nonfunctional L foot per patient report    Assessment  Patient is 58 y.o. male that presented to acute with complaints of abdominal pain and back pain. PMHx significant for poorly controlled DM and medical noncompliance, recent diagnosis of gastroparesis. He presented to PT with functional weakness and decreased activity tolerance but these are chronic deficits and he performed squat pivot transfer EOB <> chair at SBA level. Patient reported several obstacles to caring for himself: he lives in a mobile home with 5 EVELIN and he is unable to ambulate so he cannot enter or exit his home; he has a dog so he is not able to live with family members that may be able to provide support (though he provided limited information regarding this support, just that he cannot live with them due to his 11 YO dog); he does not have the financial means to pay for care giving; back pain prevents him from performing OOB activity; he was told by a physician years ago to not walk on his L foot; his R thumb is nonfunctional and prevents him from performing ADLs and IADLs. Provided education regarding supine therapeutic exercises to progress strength of RLE to improve confidence and technique with transfers. Encouraged patient to think about changes he can make to improve his quality of life (which he says he wants) instead of listing obstacles in his way. Anticipate patient would greatly benefit from any  that are available to him, specifically a home care aid to assist with IADLs. Unfortunately he appears to be at or near his baseline functional mobility so question therapeutic benefit of further acute PT. Patient will not be actively followed for physical therapy services at this time, however may be seen if requested by  "physician for 1 more visit within 30 days to address any discharge or equipment needs.    Plan    Physical Therapy Initial Treatment Plan   Duration: Discharge Needs Only    DC Equipment Recommendations:  (patient has WC)  Discharge Recommendations: Recommend home health for continued physical therapy services     Subjective    RN cleared patient for therapy, received in bed, agreeable but focused on his deficits with limiting home setup and social support     Objective     05/21/24 1353   Charge Group   PT Evaluation PT Evaluation Mod   Total Time Spent   PT Total Time Yes   PT Evaluation Time Spent (Mins) 23   PT Total Time Spent (Calculated) 23   Initial Contact Note    Initial Contact Note Order Received and Verified, Evaluation Only - Patient Does Not Require Further Acute Physical Therapy at this Time.  However, May Benefit from Post Acute Therapy for Higher Level Functional Deficits.   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 Patient reported he anticipates he will be sent home to \"rot and die\"   History of Falls "   History of Falls Yes  (patient reported 2 falls since last hospital admit)   Cognition    Level of Consciousness Alert   Comments appears to be at baseline. cooperative with therapist but limited insight and volition to change situation   Active ROM Upper Body   Comments patient moved BUE functionally for mobility but reported limited function of R thumb and difficulty performing IADLs including cooking   Active ROM Lower Body    Comments functional for mobility   Strength Lower Body   Comments patient reported gross BLE weakness; reported it has been 5-6 years since he walked. Reported inability to weight bear in LLE per MD orders   Sensation Lower Body   Comments reported impaired sensation at baseline   Balance Assessment   Sitting Balance (Static) Fair   Sitting Balance (Dynamic) Fair   Weight Shift Sitting Good   Bed Mobility    Supine to Sit Standby Assist   Sit to Supine Standby Assist   Scooting Standby Assist   Gait Analysis   Gait Level Of Assist Unable to Participate  (reported he does not ambulate at baseline)   Weight Bearing Status patient reported NWB LLE per MD order several years prior   Functional Mobility   Sit to Stand Unable to Participate   Bed, Chair, Wheelchair Transfer Standby Assist   Transfer Method Squat Pivot   6 Clicks Assessment - How much HELP from from another person do you currently need... (If the patient hasn't done an activity recently, how much help from another person do you think he/she would need if he/she tried?)   Turning from your back to your side while in a flat bed without using bedrails? 4   Moving from lying on your back to sitting on the side of a flat bed without using bedrails? 4   Moving to and from a bed to a chair (including a wheelchair)? 4   Standing up from a chair using your arms (e.g., wheelchair, or bedside chair)? 2   Walking in hospital room? 1   Climbing 3-5 steps with a railing? 1   6 clicks Mobility Score 16   Education Group   Education Provided  Role of Physical Therapist   Role of Physical Therapist Patient Response Patient;Acceptance;Explanation;Verbal Demonstration   Additional Comments provided education regarding bed level exercises to improve RLE strength   Physical Therapy Initial Treatment Plan    Duration Discharge Needs Only   Problem List    Problems Pain;Safety Awareness Deficits / Cognition   Anticipated Discharge Equipment and Recommendations   DC Equipment Recommendations   (patient has WC)   Discharge Recommendations Recommend home health for continued physical therapy services   Interdisciplinary Plan of Care Collaboration   IDT Collaboration with  Nursing   Patient Position at End of Therapy In Bed;Call Light within Reach;Tray Table within Reach;Phone within Reach   Collaboration Comments RN aware of visit, response   Session Information   Date / Session Number  5/21 - DC needs only

## 2024-05-21 NOTE — ASSESSMENT & PLAN NOTE
Pseudohyponatremia secondary to the hyperglycemia    5/21/2024  Resolved with normalization of blood glucose levels.

## 2024-05-21 NOTE — ED TRIAGE NOTES
.  Chief Complaint   Patient presents with    Abdominal Pain     luq    Diarrhea    Nausea     Biba from home pt has been having luq for several weeks. Hospitalized on 5/6 for this. Reports diarrhea and pain have not resolved. FSBS 411 last took insulin last night. Pt reports last took oxycodone for pain 2 days ago and has since ran out.   Received LR 1l and zofran 4 mg pta

## 2024-05-21 NOTE — PROGRESS NOTES
Telemetry Shift Summary    Rhythm SR/SB  HR Range 5974  Ectopy PVC, PAC  Measurements .17/.10/.37        Normal Values  Rhythm SR  HR Range    Measurements 0.12-0.20 / 0.06-0.10  / 0.30-0.52

## 2024-05-21 NOTE — DISCHARGE SUMMARY
Received call from  Manager, Lexii. She state this pt was discharged from  last week and was [facility]. [facility] was unsuccessful at contacting pt and did reach pt pt's son.[facility] willing to accept pt again as long as pt has a reliable phone number.

## 2024-05-21 NOTE — ASSESSMENT & PLAN NOTE
Patient presents with hyperglycemia in the 400s.  History of medication noncompliance.  Beta-hydroxybutyrate uric acid levels were slightly elevated.  However he is not currently in DKA.  Continue with insulin sliding scale, hypoglycemia protocol, diabetic diet  Lantus 15 units nightly and    5/21/2024  Hemoglobin A1c of 12.5.  Poorly controlled.  Blood glucose improving to the 170s.  Continue glargine insulin 20 units daily.

## 2024-05-21 NOTE — ED NOTES
"Med rec updated and complete allergies reviewed. Confirmed name and date of birth. Pt stated he was recently discharged from [facility].  Pt stated that they changed his \"insulin\" back to what he was suppose to be doing with it.  Prior pt was using a \"slding scale\" with his lantus.  Pt denies anticoagulant and antiplatelet medications.  Denies antibiotic use  in last 30 days.    [place] [facility]  [facility] [phone]  "

## 2024-05-21 NOTE — PROGRESS NOTES
4 Eyes Skin Assessment Completed by PENNIE cortez and PENNIE Dexter.    Head WDL  Ears WDL  Nose WDL  Mouth WDL  Neck WDL  Breast/Chest WDL  Shoulder Blades WDL  Spine WDL  (R) Arm/Elbow/Hand WDL  (L) Arm/Elbow/Hand WDL  Abdomen WDL  Groin WDL  Scrotum/Coccyx/Buttocks WDL  (R) Leg WDL  (L) Leg Abrasion  (R) Heel/Foot/Toe Redness and Ulcer(s)  (L) Heel/Foot/Toe WDL          Devices In Places Tele Box, Blood Pressure Cuff, and Pulse Ox      Interventions In Place N/A    Possible Skin Injury Yes    Pictures Uploaded Into Epic Yes  Wound Consult Placed Yes  RN Wound Prevention Protocol Ordered Yes

## 2024-05-21 NOTE — PROGRESS NOTES
Bedside report received from Chidi CASPER. Updated patient on plan of care. BP this morning 86/53 provider DILIP Caldera notified. Awaiting orders.  Call light and personal belongings within reach. Fall precautions in place.

## 2024-05-21 NOTE — WOUND TEAM
[facility]  Inpatient Services  Initial Wound and Skin Care Evaluation    Admission Date: 5/20/2024     Last order of IP CONSULT TO WOUND CARE was found on 5/21/2024 from Hospital Encounter on 5/20/2024     HPI, PMH, SH: Reviewed    Past Surgical History:   Procedure Laterality Date    FINGER OR HAND INCISION AND DRAINAGE Right 6/12/2023    Procedure: INCISION AND DRAINAGE, HAND - THUMB;  Surgeon: Ace Shaw M.D.;  Location: SURGERY [facility];  Service: Orthopedics    SD UPPER GI ENDOSCOPY,DIAGNOSIS N/A 3/14/2023    Procedure: GASTROSCOPY;  Surgeon: Atilio Freed M.D.;  Location: SURGERY [facility];  Service: Gastroenterology    OTHER      appy, lumbar fusion     Social History     Tobacco Use    Smoking status: Former    Smokeless tobacco: Never   Substance Use Topics    Alcohol use: Not Currently     Chief Complaint   Patient presents with    Abdominal Pain     luq    Diarrhea    Nausea     Diagnosis: Acute hypoxic respiratory failure (HCC) [J96.01]    Unit where seen by Wound Team: T706/01     WOUND CONSULT RELATED TO:  Bilateral 1st toes    WOUND TEAM PLAN OF CARE - Frequency of Follow-up:   Nursing to follow dressing orders written for wound care. Contact wound team if area fails to progress, deteriorates or with any questions/concerns if something comes up before next scheduled follow up (See below as to whether wound is following and frequency of wound follow up)   Not following, consult as needed  - any area    WOUND HISTORY:   Pt is a 58yr old male with poorly controlled diabetes, debility and inability to care for self who presented with abdominal and back pain. Pt was recently diagnosed with gastroparesis. Wound team was consulted regarding bilateral 1st toes.       WOUND ASSESSMENT/LDA                                Vascular:    MARCO A:   No results found.    Lab Values:    Lab Results   Component Value Date/Time    WBC 10.5 05/21/2024 02:08 AM    RBC 4.06 (L) 05/21/2024 02:08  AM    HEMOGLOBIN 11.3 (L) 05/21/2024 02:08 AM    HEMATOCRIT 33.4 (L) 05/21/2024 02:08 AM    CREACTPROT <0.30 01/26/2024 09:25 AM    SEDRATEWES 20 06/12/2023 01:45 AM    HBA1C 12.5 (H) 05/20/2024 05:31 PM         Culture Results show:  No results found for this or any previous visit (from the past 720 hour(s)).    Pain Level/Medicated:  None, Tolerated without pain medication       INTERVENTIONS BY WOUND TEAM:  Chart and images reviewed. Discussed with bedside RN. All areas of concern (based on picture review, LDA review and discussion with bedside RN) have been thoroughly assessed. Documentation of areas based on significant findings. This RN in to assess patient. Performed standard wound care which includes appropriate positioning, dressing removal and non-selective debridement. Pictures and measurements obtained weekly if/when required.     Area Assessed:  Bilateral Elbows  Area intact,     Area Assessed: Back  Area intact,     Area Assessed:  Sacrococcygeal area  Area intact, Offloading dressing     Area Assessed:  BLE  Area intact    Area Assessed:  Bilateral ankles  Area intact,     Area Assessed:  Bilateral heels  Area intact, Heel offloading dressings      Area Assessed:  Bilateral 1st toes  Area intact, small dry crusted area to bilateral toes left open to air      Advanced Wound Care Discharge Planning  Number of Clinicians necessary to complete wound care: 1  Is patient requiring IV pain medications for dressing changes:  No   Length of time for dressing change 30 min. (This does not include chart review, pre-medication time, set up, clean up or time spent charting.)    Interdisciplinary consultation: Patient, Bedside RN, Shania BOBBY (Wound RN).  Pressure injury and staging reviewed with N/A.    EVALUATION / RATIONALE FOR TREATMENT:     Date:  05/21/24  Wound Status:  Initial evaluation    Pt with largely intact skin. Self mobile in bed. Preventative measures ordered.         Goals: Steady decrease in wound  area and depth weekly.    NURSING PLAN OF CARE ORDERS:  RN Prevention Protocol    NUTRITION RECOMMENDATIONS   Wound Team Recommendations:  N/A    DIET ORDERS (From admission to next 24h)       Start     Ordered    05/20/24 2033  Diet Order Diet: Consistent CHO (Diabetic)  ALL MEALS        Question:  Diet:  Answer:  Consistent CHO (Diabetic)    05/20/24 2032                  Anticipated discharge plans:  TBD        Vac Discharge Needs:  Vac Discharge plan is purely a recommendation from wound team and not a requirement for discharge unless otherwise stated by physician.  Not Applicable Pt not on a wound vac

## 2024-05-21 NOTE — ASSESSMENT & PLAN NOTE
Patient unable to care for himself.  PT OT evaluation  Day team to get case management involved for placement    5/21/2024  Patient states that he does not feel that he could be discharged to his home as he lives on by himself.  PT recommending home health services.  Pending OT evaluatio

## 2024-05-21 NOTE — ASSESSMENT & PLAN NOTE
Likely reactive given his nausea vomiting.  No indication for antibiotics    5/21/2024  Trending down  Continue to hold antibiotics

## 2024-05-21 NOTE — WOUND TEAM
Assisted Phillip HUMPHREY (Wound RN), with wound care, non-selective debridement performed using wound cleanser/NS and gauze. Please see Phillip HUMPHREY (Wound RN) wound note for further wound care details.

## 2024-05-21 NOTE — ED PROVIDER NOTES
ED Provider Note    CHIEF COMPLAINT  Chief Complaint   Patient presents with    Abdominal Pain     luq    Diarrhea    Nausea     EXTERNAL RECORDS REVIEWED  Records review show that the patient was admitted on the 6th for uncontrollable blood sugars and abdominal pain. CT scan at that time shows esophagitis. Gastric emptying study is consistent with gastroparesis. They were contemplating SNF placement upon discharge, but the patient wanted to go home. He had a telemedicine with his PCP today because he was unable to go to the pharmacy to  his medications and he is unable to care for himself. His PCP sent him here.     HPI/ROS  LIMITATION TO HISTORY   Select: : None  OUTSIDE HISTORIAN(S):  None    Christoph Taylor is a 58 y.o. male with a history of diabetes who presents to the Emergency Department with abdominal pain, diarrhea and nausea.  He states symptoms have been ongoing since last discharge. Now, he notes that he has had stomach pain in the same place as before along with diarrhea. He also notes that he had bowel incontinence last night and could not male it to the bathroom and reports that he filled the diaper he was wearing. The patient reports that he has not been able to drive or take care of himself.  He states his sister sends him boxes of from [employer] to feed himself as he does not drive or leave the house.  He notes that he has been taking his insulin, with the last dose of 20 units last night however he was unable to  any of the other medications from his recent discharge.  He also states he has not had anything to eat or drink.  Patient also notes that he has been feeling weak and has had ongoing chronic back pain. Denies any vomiting. Denies normally being on oxygen.     PAST MEDICAL HISTORY  Past Medical History:   Diagnosis Date    Abnormal electrocardiogram (ECG) (EKG) 11/8/2021    KRISTAL (acute kidney injury) (HCC) 11/8/2021    Chest pain in adult 11/8/2021    CKD (chronic kidney  disease) stage 3, GFR 30-59 ml/min 11/7/2021    Diabetes (HCC)     Diabetic ketoacidosis without coma associated with type 2 diabetes mellitus (HCC) 11/7/2021    Diarrhea 3/18/2022    DKA, type 1, not at goal (HCC) 7/28/2022    Esophagitis 7/28/2022    [place] (hard of hearing)     Very [place] No hearing aides    Hypercholesteremia         SURGICAL HISTORY  Past Surgical History:   Procedure Laterality Date    FINGER OR HAND INCISION AND DRAINAGE Right 6/12/2023    Procedure: INCISION AND DRAINAGE, HAND - THUMB;  Surgeon: Ace Shaw M.D.;  Location: SURGERY [facility];  Service: Orthopedics    AR UPPER GI ENDOSCOPY,DIAGNOSIS N/A 3/14/2023    Procedure: GASTROSCOPY;  Surgeon: Atilio Freed M.D.;  Location: SURGERY [facility];  Service: Gastroenterology    OTHER      appy, lumbar fusion        FAMILY HISTORY  Family History   Problem Relation Age of Onset    Heart Disease Father        SOCIAL HISTORY   reports that he has quit smoking. He has never used smokeless tobacco. He reports that he does not currently use alcohol. He reports that he does not currently use drugs.    CURRENT MEDICATIONS  Previous Medications    ASPIRIN 81 MG EC TABLET    Take 81 mg by mouth every evening.    FAMOTIDINE (PEPCID) 20 MG TAB    Take 1 Tablet by mouth 2 times a day.    INSULIN GLARGINE (LANTUS SOLOSTAR) 100 UNIT/ML SOLUTION PEN-INJECTOR INJECTION    Inject 20 Units under the skin 2 times a day.    INSULIN GLARGINE (LANTUS) 100 UNIT/ML SC SOLN    Inject 10-25 Units under the skin 2 times a day. Pt reports that this medication is prescribed 20 units BID, pt reports that he does a sliding scale ranging from 10-25 units BID    METOCLOPRAMIDE (REGLAN) 10 MG TAB    Take 1 Tablet by mouth 3 times a day as needed (nausea vomiting).    MULTIVITAMIN TAB    Take 1 Tablet by mouth every evening.       ALLERGIES  Patient has no known allergies.    PHYSICAL EXAM  /82   Pulse 83   Temp 36.1 °C (97 °F) (Temporal)   Resp 16   Wt 80.3  kg (177 lb)   SpO2 94%      Constitutional: Chronically ill-appearing, frail. Alert in no apparent distress.  HENT: Normocephalic, Atraumatic. Bilateral external ears normal. Nose normal.  Dry mucous membranes. Oropharynx clear.  Eyes: Pupils are equal and reactive. Conjunctiva normal.   Neck: Supple, full range of motion  Heart: Regular rate and rhythm.  No murmurs.    Lungs: No respiratory distress, normal work of breathing. Lungs clear to auscultation bilaterally.  Abdomen Epigastric, left upper quadrant tenderness. Soft, no distention.    Musculoskeletal: Atraumatic. No obvious deformities noted.  No lower extremity edema.  Skin: Warm, Dry.  No erythema, No rash. Pale.  Neurologic: Alert and oriented x3. Moving all extremities spontaneously without focal deficits.  Psychiatric: Affect normal, Mood normal, Appears appropriate and not intoxicated.     DIAGNOSTIC STUDIES    EKG  I have independently interpreted this EKG  Results for orders placed or performed during the hospital encounter of 24   EKG (NOW)   Result Value Ref Range    Report       [facility] of the [facility] Emergency Dept.    Test Date:  2024  Pt Name:    BAILEE JACOBSON               Department: ER  MRN:        [id number]                      Room:       [facility]  Gender:     Male                         Technician: [id number]  :        1965                   Requested By:BRENNA MANSFIELD  Order #:    [id number]                    Reading MD: Brenna Mansfield MD    Measurements  Intervals                                Axis  Rate:       84                           P:          67  AK:         189                          QRS:        77  QRSD:       92                           T:          49  QT:         377  QTc:        446    Interpretive Statements  Sinus rhythm  Minimal ST elevation, inferior leads, unchanged  No acute ST or  T wave change  Compared to ECG 2024 12:48:43  No significant change from prior  Electronically Signed On  05- 19:44:15 PDT by Brenna Swan MD       LABS  Labs Reviewed   CBC WITH DIFFERENTIAL - Abnormal; Notable for the following components:       Result Value    WBC 11.3 (*)     RBC 4.26 (*)     Hemoglobin 12.0 (*)     Hematocrit 34.7 (*)     MPV 8.2 (*)     Neutrophils-Polys 85.90 (*)     Lymphocytes 8.20 (*)     Neutrophils (Absolute) 9.74 (*)     Lymphs (Absolute) 0.93 (*)     All other components within normal limits   COMP METABOLIC PANEL - Abnormal; Notable for the following components:    Sodium 132 (*)     Chloride 95 (*)     Glucose 490 (*)     Bun 23 (*)     AST(SGOT) 11 (*)     All other components within normal limits   VENOUS BLOOD GAS - Abnormal; Notable for the following components:    Venous Bg Pco2 40.3 (*)     Venous Bg Po2 94.7 (*)     All other components within normal limits   BETA-HYDROXYBUTYRIC ACID - Abnormal; Notable for the following components:    beta-Hydroxybutyric Acid 0.32 (*)     All other components within normal limits   PROBRAIN NATRIURETIC PEPTIDE, NT - Abnormal; Notable for the following components:    NT-proBNP 340 (*)     All other components within normal limits   TROPONIN - Abnormal; Notable for the following components:    Troponin T 27 (*)     All other components within normal limits   LIPASE   ESTIMATED GFR   CULTURE STOOL   CDIFF BY PCR RFLX TOXIN   HEMOGLOBIN A1C   URINALYSIS     RADIOLOGY  I have independently interpreted the diagnostic imaging associated with this visit and am waiting the final reading from the radiologist.   My preliminary interpretation is as follows: no infiltrate    Radiologist interpretation:  DX-CHEST-PORTABLE (1 VIEW)   Final Result      No acute cardiac or pulmonary abnormalities are identified.        COURSE & MEDICAL DECISION MAKING    5:20 PM - Patient seen and examined at bedside. Discussed plan of care, including ordering labs and imaging to further evaluate. Patient agrees to the plan of care. The patient will be resuscitated with  1L NS IV and medicated with Reglan 10 mg and morphine 4 mg. Ordered for DX-chest, Culture stool, C diff by PCR rflx Toxin, Venous blood gas, beta-hydroxybutyric acid, proBrain natriuretic peptide, Troponin, eGFR, CBC with diff, CMP, Lipase, EKG to evaluate his symptoms.       ASSESSMENT, COURSE AND PLAN  Care Narrative: Patient with history of poorly controlled diabetes and gastroparesis who was recently discharged from the hospital for the same, now presenting with ongoing abdominal pain and vomiting and difficulty caring for himself.  He is hypertensive on arrival with otherwise reassuring vital signs.  I did consider further imaging however his abdominal exam is not concerning for surgical process such as bowel obstruction or perforation, appendicitis, or diverticulitis.  I suspect his symptoms are due to ongoing gastroparesis.  His labs show mild leukocytosis, no renal dysfunction or electrolyte abnormality.  He does have an elevated blood sugar at 490 however no signs of associated acidosis or DKA.  Troponin is minimally elevated however appears at baseline.  His EKG does not show any evidence of acute ischemia or arrhythmia.  The patient was found to be mildly hypoxic to the mid 80s.  His chest x-ray does not show pneumonia or pulmonary edema.  I did discuss the patient's care with case management and due to hypoxia she is recommending admission.  Patient will be admitted with the plan ultimately for placement.     Upon reassessment, patient is resting comfortably with stable vital signs.  No new complaints at this time.  Discussed results with patient and/or family as well as plan of care for admission. Patient verbalizes understanding and agreement to this plan of care.    ADDITIONAL PROBLEM LIST  Problem #1: Hyperglycemia -due to poorly controlled diabetes, given IV fluids, restart home insulin    Problem #2: Gastroparesis -treated symptomatically with Reglan with improvement of symptoms    Problem #3:  Hypoxia -unclear etiology, will plan for admission    DISPOSITION AND DISCUSSIONS  I have discussed management of the patient with the following physicians and MICHELLE's:  Dr. Pastor    Discussion of management with other [facility] or appropriate source(s): Case Management regarding placement        Decision tools and prescription drugs considered including, but not limited to: Antibiotics no signs of infectious process .    DISPOSITION:  Patient will be hospitalized by Dr. Pastor in guarded condition.     FINAL DIAGNOSIS  1. Hyperglycemia    2. Gastroparesis    3. Hypoxia      The note accurately reflects work and decisions made by me.  Brenna Swan M.D.  5/20/2024  9:06 PM     Lauren SPEAR (Dolly), am scribing for, and in the presence of, Brenna Swan M.D..    Electronically signed by: Lauren Arredondo (Dolly), 5/20/2024    Brenna SPEAR M.D. personally performed the services described in this documentation, as scribed by Lauren Arredondo in my presence, and it is both accurate and complete.

## 2024-05-21 NOTE — CARE PLAN
The patient is Stable - Low risk of patient condition declining or worsening    Shift Goals  Clinical Goals: safety and comfort, specimen collection  Patient Goals: rest    Progress made toward(s) clinical / shift goals:    Problem: Knowledge Deficit - Standard  Goal: Patient and family/care givers will demonstrate understanding of plan of care, disease process/condition, diagnostic tests and medications  Outcome: Progressing     Problem: Pain - Standard  Goal: Alleviation of pain or a reduction in pain to the patient’s comfort goal  Outcome: Progressing     Problem: Fall Risk  Goal: Patient will remain free from falls  Outcome: Progressing       Patient is not progressing towards the following goals:

## 2024-05-21 NOTE — H&P
Hospital Medicine History & Physical Note    Date of Service  5/20/2024    Primary Care Physician  YESY Tariq.        Code Status  Full Code    Chief Complaint  Chief Complaint   Patient presents with    Abdominal Pain     luq    Diarrhea    Nausea       History of Presenting Illness  Christoph Taylor is a 58 y.o. male with known history of poorly controlled diabetes mellitus secondary to medication noncompliance, debility and inability to care for self who presented 5/20/2024 with abdominal pain and back pain.  Patient states his abdominal pain is chronic in nature.  He was recently diagnosed with gastroparesis, but states that he has not been able to get his medications.  Patient states that he lives at home alone, and is unable to care for himself.  He does not leave the house, he has not gone to the pharmacy.  In terms of getting food, his sister will send him packages.  While in the emergency department patient was notably hyperglycemic.  He was found to be acutely hypoxic with oxygen saturations of 88 to 89%.  Chest x-ray was unremarkable.  Patient will be admitted for acute Evox[place] respiratory failure and severe hyperglycemia    I discussed the plan of care with patient.    Review of Systems  Review of Systems   Constitutional:  Negative for chills and fever.   HENT:  Negative for congestion, ear discharge, ear pain, nosebleeds, sinus pain and tinnitus.    Eyes:  Negative for double vision, photophobia, pain and discharge.   Respiratory:  Negative for cough, hemoptysis, sputum production and shortness of breath.    Cardiovascular:  Negative for chest pain, palpitations and orthopnea.   Gastrointestinal:  Positive for abdominal pain, nausea and vomiting. Negative for blood in stool, constipation and diarrhea.   Genitourinary:  Negative for frequency, hematuria and urgency.   Musculoskeletal:  Positive for back pain. Negative for neck pain.   Neurological:  Negative for dizziness and headaches.    Psychiatric/Behavioral:  Negative for hallucinations, substance abuse and suicidal ideas. The patient is not nervous/anxious and does not have insomnia.        Past Medical History   has a past medical history of Abnormal electrocardiogram (ECG) (EKG) (11/8/2021), KRISTAL (acute kidney injury) ([facility]) (11/8/2021), Chest pain in adult (11/8/2021), CKD (chronic kidney disease) stage 3, GFR 30-59 ml/min (11/7/2021), Diabetes ([facility]), Diabetic ketoacidosis without coma associated with type 2 diabetes mellitus ([facility]) (11/7/2021), Diarrhea (3/18/2022), DKA, type 1, not at goal ([facility]) (7/28/2022), Esophagitis (7/28/2022), [place] (hard of hearing), and Hypercholesteremia.    Surgical History   has a past surgical history that includes other; pr upper gi endoscopy,diagnosis (N/A, 3/14/2023); and finger or hand incision and drainage (Right, 6/12/2023).     Family History  family history includes Heart Disease in his father.   Family history reviewed with patient. There is no family history that is pertinent to the chief complaint.     Social History   reports that he has quit smoking. He has never used smokeless tobacco. He reports that he does not currently use alcohol. He reports that he does not currently use drugs.    Allergies  No Known Allergies    Medications  Prior to Admission Medications   Prescriptions Last Dose Informant Patient Reported? Taking?   aspirin 81 MG EC tablet  Patient Yes No   Sig: Take 81 mg by mouth every evening.   famotidine (PEPCID) 20 MG Tab  Patient No No   Sig: Take 1 Tablet by mouth 2 times a day.   insulin glargine (LANTUS SOLOSTAR) 100 UNIT/ML Solution Pen-injector injection   No No   Sig: Inject 20 Units under the skin 2 times a day.   insulin glargine (LANTUS) 100 UNIT/ML SC SOLN  Patient Yes No   Sig: Inject 10-25 Units under the skin 2 times a day. Pt reports that this medication is prescribed 20 units BID, pt reports that he does a sliding scale ranging from 10-25 units BID   metoclopramide  (REGLAN) 10 MG Tab  Patient No No   Sig: Take 1 Tablet by mouth 3 times a day as needed (nausea vomiting).   multivitamin Tab  Patient Yes No   Sig: Take 1 Tablet by mouth every evening.      Facility-Administered Medications: None       Physical Exam  Temp:  [36.1 °C (97 °F)] 36.1 °C (97 °F)  Pulse:  [82-84] 82  Resp:  [16] 16  BP: (130-158)/(81-89) 158/89  SpO2:  [88 %-98 %] 88 %  Blood Pressure: (!) 158/89   Temperature: 36.1 °C (97 °F)   Pulse: 82   Respiration: 16   Pulse Oximetry: 88 %       Physical Exam  Constitutional:       General: He is not in acute distress.     Appearance: Normal appearance. He is normal weight. He is not ill-appearing, toxic-appearing or diaphoretic.   HENT:      Head: Normocephalic and atraumatic.      Mouth/Throat:      Mouth: Mucous membranes are moist.   Eyes:      Pupils: Pupils are equal, round, and reactive to light.   Cardiovascular:      Rate and Rhythm: Normal rate and regular rhythm.      Pulses: Normal pulses.      Heart sounds: Normal heart sounds. No murmur heard.     No friction rub. No gallop.   Pulmonary:      Effort: Pulmonary effort is normal. No respiratory distress.      Breath sounds: Normal breath sounds. No stridor. No wheezing, rhonchi or rales.   Chest:      Chest wall: No tenderness.   Abdominal:      General: There is no distension.      Palpations: There is no mass.      Tenderness: There is abdominal tenderness. There is no right CVA tenderness, left CVA tenderness, guarding or rebound.      Hernia: No hernia is present.   Musculoskeletal:         General: No swelling, tenderness, deformity or signs of injury.      Right lower leg: No edema.      Left lower leg: No edema.   Skin:     General: Skin is warm and dry.      Capillary Refill: Capillary refill takes less than 2 seconds.      Coloration: Skin is not jaundiced or pale.      Findings: No bruising, erythema, lesion or rash.   Neurological:      General: No focal deficit present.      Mental Status:  He is alert and oriented to person, place, and time. Mental status is at baseline.      Cranial Nerves: No cranial nerve deficit.      Sensory: No sensory deficit.      Motor: No weakness.      Coordination: Coordination normal.   Psychiatric:         Mood and Affect: Mood normal.         Laboratory:  Recent Labs     05/20/24  1731   WBC 11.3*   RBC 4.26*   HEMOGLOBIN 12.0*   HEMATOCRIT 34.7*   MCV 81.5   MCH 28.2   MCHC 34.6   RDW 39.2   PLATELETCT 257   MPV 8.2*     Recent Labs     05/20/24  1731   SODIUM 132*   POTASSIUM 4.6   CHLORIDE 95*   CO2 28   GLUCOSE 490*   BUN 23*   CREATININE 0.60   CALCIUM 8.5     Recent Labs     05/20/24  1731   ALTSGPT 22   ASTSGOT 11*   ALKPHOSPHAT 82   TBILIRUBIN 0.3   LIPASE 30   GLUCOSE 490*         Recent Labs     05/20/24  1731   NTPROBNP 340*         Recent Labs     05/20/24  1731   TROPONINT 27*       Imaging:  DX-CHEST-PORTABLE (1 VIEW)   Final Result      No acute cardiac or pulmonary abnormalities are identified.          X-Ray:  I have personally reviewed the images and compared with prior images.  EKG:  I have personally reviewed the images and compared with prior images.    Assessment/Plan:  Justification for Admission Status  I anticipate this patient is appropriate for observation status at this time because hyperglycemia    Patient will need a Telemetry bed on MEDICAL service .  The need is secondary to hyperglycemia.    * Acute hypoxic respiratory failure (HCC)- (present on admission)  Assessment & Plan  Currently requiring 1L supplemental oxygen  CXR negative  No URI symptoms  Likely secondary to morphine dose in the ED. His oxygenation does improve after a few deep breaths, but then subsequently drops down to 88-89%  Continue to titrate off oxygen     Debility  Assessment & Plan  Patient unable to care for himself.  PT OT evaluation  Day team to get case management involved for placement    Leukocytosis  Assessment & Plan  Likely reactive given his nausea  vomiting.  No indication for antibiotics      Elevated troponin- (present on admission)  Assessment & Plan  Troponin of 27, which is decreased compared to his troponin level 2 weeks ago    Diabetes mellitus type 2, insulin dependent, poorly controlled with marked hyperglycemia (HCC)- (present on admission)  Assessment & Plan  Patient presents with hyperglycemia in the 400s.  History of medication noncompliance.  Beta-hydroxybutyrate uric acid levels were slightly elevated.  However he is not currently in DKA.  Continue with insulin sliding scale, hypoglycemia protocol, diabetic diet  Lantus 15 units nightly    Hyponatremia- (present on admission)  Assessment & Plan  Pseudohyponatremia secondary to the hyperglycemia        VTE prophylaxis: enoxaparin ppx

## 2024-05-21 NOTE — ED NOTES
Bedside report received from off going RN/tech: Danika CASPER, assumed care of patient.  POC discussed with patient. Call light within reach, all needs addressed at this time.       Fall risk interventions in place: Move the patient closer to the nurse's station, Patient's personal possessions are with in their safe reach, Place socks on patient, Place fall risk sign on patient's door, Give patient urinal if applicable, and Keep floor surfaces clean and dry (all applicable per [place] Fall risk assessment)   Continuous monitoring: Cardiac Leads, Pulse Ox, or Blood Pressure  IVF/IV medications: Not Applicable   Oxygen: Room Air  Bedside sitter: Not Applicable   Isolation: Not Applicable

## 2024-05-21 NOTE — ED NOTES
Pt being taken upstairs at this time by RN via malcolm. Pt is awake and alert, talking to staff, in no apparent distress at time of transfer. Pt's paperwork and belongings sent upstairs with pt.

## 2024-05-21 NOTE — ASSESSMENT & PLAN NOTE
Currently requiring 1L supplemental oxygen  CXR negative  No URI symptoms  Likely secondary to morphine dose in the ED. His oxygenation does improve after a few deep breaths, but then subsequently drops down to 88-89%  Continue to titrate off oxygen     Currently on RA  Continue monitoring

## 2024-05-22 LAB
ALBUMIN SERPL BCP-MCNC: 3 G/DL (ref 3.2–4.9)
ALBUMIN/GLOB SERPL: 1.3 G/DL
ALP SERPL-CCNC: 68 U/L (ref 30–99)
ALT SERPL-CCNC: 18 U/L (ref 2–50)
ANION GAP SERPL CALC-SCNC: 10 MMOL/L (ref 7–16)
AST SERPL-CCNC: 14 U/L (ref 12–45)
BASOPHILS # BLD AUTO: 0.2 % (ref 0–1.8)
BASOPHILS # BLD: 0.01 K/UL (ref 0–0.12)
BILIRUB SERPL-MCNC: 0.2 MG/DL (ref 0.1–1.5)
BUN SERPL-MCNC: 29 MG/DL (ref 8–22)
CALCIUM ALBUM COR SERPL-MCNC: 9.1 MG/DL (ref 8.5–10.5)
CALCIUM SERPL-MCNC: 8.3 MG/DL (ref 8.5–10.5)
CHLORIDE SERPL-SCNC: 101 MMOL/L (ref 96–112)
CO2 SERPL-SCNC: 28 MMOL/L (ref 20–33)
CREAT SERPL-MCNC: 0.88 MG/DL (ref 0.5–1.4)
CRP SERPL HS-MCNC: <0.3 MG/DL (ref 0–0.75)
E COLI SXT1+2 STL IA: NORMAL
EOSINOPHIL # BLD AUTO: 0.18 K/UL (ref 0–0.51)
EOSINOPHIL NFR BLD: 3.4 % (ref 0–6.9)
ERYTHROCYTE [DISTWIDTH] IN BLOOD BY AUTOMATED COUNT: 40.4 FL (ref 35.9–50)
ERYTHROCYTE [SEDIMENTATION RATE] IN BLOOD BY WESTERGREN METHOD: 19 MM/HOUR (ref 0–20)
GFR SERPLBLD CREATININE-BSD FMLA CKD-EPI: 98 ML/MIN/1.73 M 2
GLOBULIN SER CALC-MCNC: 2.3 G/DL (ref 1.9–3.5)
GLUCOSE BLD STRIP.AUTO-MCNC: 125 MG/DL (ref 65–99)
GLUCOSE BLD STRIP.AUTO-MCNC: 138 MG/DL (ref 65–99)
GLUCOSE BLD STRIP.AUTO-MCNC: 221 MG/DL (ref 65–99)
GLUCOSE BLD STRIP.AUTO-MCNC: 95 MG/DL (ref 65–99)
GLUCOSE SERPL-MCNC: 129 MG/DL (ref 65–99)
HCT VFR BLD AUTO: 32.3 % (ref 42–52)
HGB BLD-MCNC: 11 G/DL (ref 14–18)
IMM GRANULOCYTES # BLD AUTO: 0.01 K/UL (ref 0–0.11)
IMM GRANULOCYTES NFR BLD AUTO: 0.2 % (ref 0–0.9)
LYMPHOCYTES # BLD AUTO: 1.2 K/UL (ref 1–4.8)
LYMPHOCYTES NFR BLD: 22.6 % (ref 22–41)
MAGNESIUM SERPL-MCNC: 1.8 MG/DL (ref 1.5–2.5)
MCH RBC QN AUTO: 28.2 PG (ref 27–33)
MCHC RBC AUTO-ENTMCNC: 34.1 G/DL (ref 32.3–36.5)
MCV RBC AUTO: 82.8 FL (ref 81.4–97.8)
MONOCYTES # BLD AUTO: 0.27 K/UL (ref 0–0.85)
MONOCYTES NFR BLD AUTO: 5.1 % (ref 0–13.4)
NEUTROPHILS # BLD AUTO: 3.63 K/UL (ref 1.82–7.42)
NEUTROPHILS NFR BLD: 68.5 % (ref 44–72)
NRBC # BLD AUTO: 0 K/UL
NRBC BLD-RTO: 0 /100 WBC (ref 0–0.2)
PHOSPHATE SERPL-MCNC: 3.4 MG/DL (ref 2.5–4.5)
PLATELET # BLD AUTO: 232 K/UL (ref 164–446)
PMV BLD AUTO: 8.2 FL (ref 9–12.9)
POTASSIUM SERPL-SCNC: 4.2 MMOL/L (ref 3.6–5.5)
PROCALCITONIN SERPL-MCNC: 0.05 NG/ML
PROT SERPL-MCNC: 5.3 G/DL (ref 6–8.2)
RBC # BLD AUTO: 3.9 M/UL (ref 4.7–6.1)
SIGNIFICANT IND 70042: NORMAL
SITE SITE: NORMAL
SODIUM SERPL-SCNC: 139 MMOL/L (ref 135–145)
SOURCE SOURCE: NORMAL
WBC # BLD AUTO: 5.3 K/UL (ref 4.8–10.8)

## 2024-05-22 PROCEDURE — 99233 SBSQ HOSP IP/OBS HIGH 50: CPT | Performed by: STUDENT IN AN ORGANIZED HEALTH CARE EDUCATION/TRAINING PROGRAM

## 2024-05-22 RX ADMIN — INSULIN GLARGINE-YFGN 20 UNITS: 100 INJECTION, SOLUTION SUBCUTANEOUS at 17:43

## 2024-05-22 RX ADMIN — ENOXAPARIN SODIUM 40 MG: 100 INJECTION SUBCUTANEOUS at 17:09

## 2024-05-22 RX ADMIN — INSULIN GLARGINE-YFGN 20 UNITS: 100 INJECTION, SOLUTION SUBCUTANEOUS at 06:12

## 2024-05-22 RX ADMIN — KETOROLAC TROMETHAMINE 15 MG: 15 INJECTION, SOLUTION INTRAMUSCULAR; INTRAVENOUS at 23:45

## 2024-05-22 RX ADMIN — ASPIRIN 81 MG: 81 TABLET, COATED ORAL at 17:10

## 2024-05-22 RX ADMIN — INSULIN LISPRO 2 UNITS: 100 INJECTION, SOLUTION INTRAVENOUS; SUBCUTANEOUS at 17:44

## 2024-05-22 RX ADMIN — ACETAMINOPHEN 1000 MG: 500 TABLET, FILM COATED ORAL at 17:10

## 2024-05-22 RX ADMIN — POLYETHYLENE GLYCOL 3350 1 PACKET: 17 POWDER, FOR SOLUTION ORAL at 20:08

## 2024-05-22 RX ADMIN — ACETAMINOPHEN 1000 MG: 500 TABLET, FILM COATED ORAL at 05:37

## 2024-05-22 RX ADMIN — DOCUSATE SODIUM 100 MG: 100 CAPSULE, LIQUID FILLED ORAL at 05:37

## 2024-05-22 RX ADMIN — ACETAMINOPHEN 1000 MG: 500 TABLET, FILM COATED ORAL at 23:45

## 2024-05-22 RX ADMIN — KETOROLAC TROMETHAMINE 15 MG: 15 INJECTION, SOLUTION INTRAMUSCULAR; INTRAVENOUS at 12:33

## 2024-05-22 RX ADMIN — DOCUSATE SODIUM 100 MG: 100 CAPSULE, LIQUID FILLED ORAL at 17:10

## 2024-05-22 RX ADMIN — KETOROLAC TROMETHAMINE 15 MG: 15 INJECTION, SOLUTION INTRAMUSCULAR; INTRAVENOUS at 17:10

## 2024-05-22 RX ADMIN — KETOROLAC TROMETHAMINE 15 MG: 15 INJECTION, SOLUTION INTRAMUSCULAR; INTRAVENOUS at 05:37

## 2024-05-22 RX ADMIN — PANTOPRAZOLE SODIUM 40 MG: 40 INJECTION, POWDER, FOR SOLUTION INTRAVENOUS at 05:37

## 2024-05-22 RX ADMIN — ACETAMINOPHEN 1000 MG: 500 TABLET, FILM COATED ORAL at 12:00

## 2024-05-22 RX ADMIN — PANTOPRAZOLE SODIUM 40 MG: 40 INJECTION, POWDER, FOR SOLUTION INTRAVENOUS at 17:10

## 2024-05-22 ASSESSMENT — COPD QUESTIONNAIRES
DO YOU EVER COUGH UP ANY MUCUS OR PHLEGM?: NO/ONLY WITH OCCASIONAL COLDS OR INFECTIONS
COPD SCREENING SCORE: 3
DURING THE PAST 4 WEEKS HOW MUCH DID YOU FEEL SHORT OF BREATH: NONE/LITTLE OF THE TIME
HAVE YOU SMOKED AT LEAST 100 CIGARETTES IN YOUR ENTIRE LIFE: YES

## 2024-05-22 ASSESSMENT — ENCOUNTER SYMPTOMS
MYALGIAS: 0
CHILLS: 0
SHORTNESS OF BREATH: 0
NAUSEA: 1
FEVER: 0
VOMITING: 0
ABDOMINAL PAIN: 0
PALPITATIONS: 0
COUGH: 0
DIARRHEA: 1
WEAKNESS: 1
DIZZINESS: 0
HEADACHES: 0

## 2024-05-22 ASSESSMENT — PAIN DESCRIPTION - PAIN TYPE
TYPE: ACUTE PAIN

## 2024-05-22 NOTE — DISCHARGE PLANNING
Care Transition Team Assessment    This RN CM completed assessment at bedside and patient A/Ox4. Patient has a readmission within last 30 days related to lack of support on discharge. Pt was set up with [employer] however the [employer] was unable to get a a hold of pt and family.   Per patient he stated that he is unable to care for himself DME is Cane, FWW, and Wheelchair. Pt lives in a 1 story modular home with 6 steps into the house with no ramp access in place.     Pt has a car but does not feel safe driving, reports challenges with cooking, cleaning, and is scared to take a shower as he might fall.   Son duncan lives in [facility] and sister Kathie lives in [facility]. Christoph has no supports locally from friends, family, or neighbors.     Patient has 2200 per month for income from custodial and has no SS as he is only 58 years old. Christoph reported that his family is not able to support him and they they are unable to contribute funds for income. This RN CM received consent from patient to call son and sister for further information. Pt reports that he has been feeling depressed, he also lives with a dog named Carlos Bass, whom has food and water for 2 days per pt.    Information Source  Orientation Level: Oriented X4  Information Given By: Patient  Informant's Name: Christoph  Who is responsible for making decisions for patient? : Patient    Readmission Evaluation  Is this a readmission?: Yes - unplanned readmission  Why do you think you were readmitted?: Per patient he is unable to care for himself  Was an appointment arranged for you prior to discharge?: Yes, did not attend appointment  Why didn't you go to your appointment?: Transportation  Were there new prescriptions you were supposed to fill after you were discharged?: Yes, prescriptions filled  Did you understand your discharge instructions?: Yes  Did you have enough support after your last discharge?: No  Please explain: Pt is staing that he  cannot care for self with cooking, cleaning, and transportation.    Elopement Risk  Legal Hold: No  Ambulatory or Self Mobile in Wheelchair: Yes  Disoriented: No  Psychiatric Symptoms: None  History of Wandering: No  Elopement this Admit: No  Vocalizing Wanting to Leave: No  Displays Behaviors, Body Language Wanting to Leave: No-Not at Risk for Elopement  Elopement Risk: Not at Risk for Elopement    Interdisciplinary Discharge Planning  Lives with - Patient's Self Care Capacity: Alone and Unable to Care For Self  Patient or legal guardian wants to designate a caregiver: No  Support Systems: Family Member(s)  Housing / Facility: Mobile Home  Prior Services: None    Discharge Preparedness  What is your plan after discharge?: Group home, Skilled nursing facility  Prior Functional Level: Needs Assist with Activities of Daily Living, Needs Assist with Medication Management  Difficulity with ADLs: Walking  Difficulty with ADLs Comment: pt uses a FWW, cane, wheelchair  Difficulity with IADLs: Cooking, Driving, Shopping  Difficulity with IADL Comments: pt reports that he is unable to care for himself and will need caregiver support    Functional Assesment  Prior Functional Level: Needs Assist with Activities of Daily Living, Needs Assist with Medication Management    Finances  Financial Barriers to Discharge: Yes  Average Monthly Income: 2200 $  Source of Income: [employer]  Prescription Coverage: Yes    Vision / Hearing Impairment  Vision Impairment : No  Hearing Impairment : No    Advance Directive  Advance Directive?: None    Domestic Abuse  Have you ever been the victim of abuse or violence?: No  Possible Abuse/Neglect Reported to:: Not Applicable    Psychological Assessment  History of Substance Abuse: None  History of Psychiatric Problems: No  Non-compliant with Treatment: No  Newly Diagnosed Illness: No    Discharge Risks or Barriers  Discharge risks or barriers?: Transportation, Lives alone, no community  support  Patient risk factors: Lack of outside supports, Vulnerable adult    Anticipated Discharge Information  Discharge Disposition: D/T to SNF with Medicare cert in anticipation of skilled care (03)  Discharge Address: [address] [place] [address] [address] [id number]  Discharge Contact Phone Number: [phone]

## 2024-05-22 NOTE — PROGRESS NOTES
[facility] Medicine Daily Progress Note    Date of Service  5/22/2024    Chief Complaint  Christoph Taylor is a 58 y.o. male admitted 5/20/2024 with abdominal, diarrhea, and nausea.    Hospital Course  Christoph Taylor is a 58 y.o. male with known history of poorly controlled diabetes mellitus secondary to medication noncompliance, debility and inability to care for self who presented 5/20/2024 with abdominal pain and back pain.  Patient states his abdominal pain is chronic in nature.  He was recently diagnosed with gastroparesis, but states that he has not been able to get his medications.  Patient states that he lives at home alone, and is unable to care for himself.  He does not leave the house, he has not gone to the pharmacy.  In terms of getting food, his sister will send him packages.    While in the emergency department patient was notably hyperglycemic.  He was found to be acutely hypoxic with oxygen saturations of 88 to 89%.  Chest x-ray was unremarkable.  Patient will be admitted for acute respiratory failure with hpyoxia and severe hyperglycemia    A1c 12.5    Interval Problem Update  5/21/2024  Patient was seen and examined on the telemetry floor.  He continues on continuous cardiac monitoring.  Systolic blood pressures dropping to 85/53 improving with 100 mL LR bolus.  Complaining of left upper quadrant pain.  Does report a history of GERD.  Denies any significant alcohol intake.  I am concerned about gastritis versus esophagitis.  I have changes famotidine to pantoprazole 40 mg IV twice daily.  Pain improved with GI cocktail.    CT scan of the abdomen and pelvis per my read shows significant amount of stool burden within the transverse colon.  Distended bladder noted.  I am also concerned about significant constipation versus stool impaction within the transverse colon.  Starting MiraLAX 3 times a day and docusate twice a day.    Patient states that he does not feel that he could be discharged to  his home as he lives on by himself.    PT recommending home health services.  Pending OT evaluation.      Patient is being transferred to medical floor.    I reviewed patient's discharge summary from [facility] on 1/28/2024, where he was hospitalized for 2 days.  Presented with urinary tract infection abdominal pain, urine growing Klebsiella resistant to ampicillin but sensitive to Macrobid.  Crowley catheter placed due to inability to void.  He was taking Flomax.  Imaging at that time also showing constipation and retained stool throughout the colon.  Symptoms improved with Reglan and MiraLAX followed by multiple bowel movements.  Abdominal pain resolved.  Was able to mobilize with a walker and wheelchair.    5/22: Patient was transferred from telemetry yesterday.  I have reviewed prior CT abdomen and prior medical record  A1c 12.5. continue lantus with SSI and hypoglycemia protocol  He is currently on room air.  Blood pressure stable.  Patient still reports mild left upper quadrant pain.  He is tolerating diet and has been requesting double portions of food. Last BM today  Patient states he lives alone and does not feel he is able to care for himself.  PT recommended home health care.  OT pending.   is involved for discharge planning    I have discussed this patient's plan of care and discharge plan at IDT rounds today with Case Management, Nursing, Nursing leadership, and other members of the IDT team.    Consultants/Specialty  none    Code Status  Full Code    Disposition  The patient is not medically cleared for discharge to home or a post-acute facility.      I have placed the appropriate orders for post-discharge needs.    Review of Systems  Review of Systems   Constitutional:  Positive for malaise/fatigue. Negative for chills and fever.   Respiratory:  Negative for cough and shortness of breath.    Cardiovascular:  Negative for chest pain and palpitations.   Gastrointestinal:  Positive  for diarrhea and nausea. Negative for abdominal pain and vomiting.   Musculoskeletal:  Negative for joint pain and myalgias.   Neurological:  Positive for weakness. Negative for dizziness and headaches.        Physical Exam  Temp:  [36.1 °C (96.9 °F)-36.7 °C (98 °F)] 36.1 °C (96.9 °F)  Pulse:  [60-80] 60  Resp:  [16-18] 16  BP: ()/(44-81) 124/81  SpO2:  [92 %-99 %] 99 %    Physical Exam  Vitals and nursing note reviewed.   Constitutional:       General: He is not in acute distress.     Appearance: He is ill-appearing.   HENT:      Head: Normocephalic and atraumatic.      Mouth/Throat:      Mouth: Mucous membranes are moist.      Pharynx: Oropharynx is clear. No oropharyngeal exudate.   Eyes:      General: No scleral icterus.        Right eye: No discharge.         Left eye: No discharge.      Conjunctiva/sclera: Conjunctivae normal.   Cardiovascular:      Rate and Rhythm: Normal rate and regular rhythm.      Pulses: Normal pulses.      Heart sounds: Normal heart sounds. No murmur heard.  Pulmonary:      Effort: Pulmonary effort is normal. No respiratory distress.      Breath sounds: Normal breath sounds.   Abdominal:      General: Abdomen is flat. Bowel sounds are decreased. There is no distension.      Palpations: Abdomen is soft.      Tenderness: There is abdominal tenderness in the left upper quadrant. There is no guarding.   Musculoskeletal:         General: No swelling.      Cervical back: Neck supple. No tenderness.      Right lower leg: No edema.      Left lower leg: No edema.   Skin:     General: Skin is warm and dry.      Coloration: Skin is not pale.   Neurological:      Mental Status: He is alert and oriented to person, place, and time. Mental status is at baseline.      Motor: Weakness present.   Psychiatric:         Thought Content: Thought content normal.         Judgment: Judgment normal.         Fluids    Intake/Output Summary (Last 24 hours) at 5/22/2024 3925  Last data filed at 5/22/2024  1400  Gross per 24 hour   Intake 560 ml   Output 1100 ml   Net -540 ml       Laboratory  Recent Labs     05/20/24  1731 05/21/24  0208 05/22/24  0909   WBC 11.3* 10.5 5.3   RBC 4.26* 4.06* 3.90*   HEMOGLOBIN 12.0* 11.3* 11.0*   HEMATOCRIT 34.7* 33.4* 32.3*   MCV 81.5 82.3 82.8   MCH 28.2 27.8 28.2   MCHC 34.6 33.8 34.1   RDW 39.2 39.8 40.4   PLATELETCT 257 274 232   MPV 8.2* 8.6* 8.2*     Recent Labs     05/20/24  1731 05/21/24  0208 05/22/24  0909   SODIUM 132* 138 139   POTASSIUM 4.6 4.3 4.2   CHLORIDE 95* 101 101   CO2 28 27 28   GLUCOSE 490* 357* 129*   BUN 23* 21 29*   CREATININE 0.60 0.64 0.88   CALCIUM 8.5 8.2* 8.3*                   Imaging  DX-CHEST-PORTABLE (1 VIEW)   Final Result      No acute cardiac or pulmonary abnormalities are identified.           Assessment/Plan  * Acute respiratory failure with hypoxia (HCC)- (present on admission)  Assessment & Plan  Currently requiring 1L supplemental oxygen  CXR negative  No URI symptoms  Likely secondary to morphine dose in the ED. His oxygenation does improve after a few deep breaths, but then subsequently drops down to 88-89%  Continue to titrate off oxygen     Currently on RA  Continue monitoring    Acute superficial gastritis without hemorrhage- (present on admission)  Assessment & Plan  5/21/2024  Concerning per history and physical exam  GI cocktail followed by pantoprazole 40 mg p.o. daily  Discontinued on famotidine    Impaction of intestine (HCC)- (present on admission)  Assessment & Plan  5/21/2024  CT scan of the abdomen and pelvis per my read shows significant amount of stool burden within the transverse colon.  I am also concerned about significant constipation versus stool impaction within the transverse colon.    Starting MiraLAX 3 times a day and docusate twice a day.    Intractable left upper quadrant abdominal pain- (present on admission)  Assessment & Plan  5/21/2024  Differential includes gastritis, esophagitis, and significant constipation  versus stool impaction within the transverse colon.    CT scan of the abdomen and pelvis per my read shows significant amount of stool burden within the transverse colon.  I am also concerned about significant constipation versus stool impaction within the transverse colon.  Starting MiraLAX 3 times a day and docusate twice a day.    Schedule Tylenol 1 g every 6 hours  IV Toradol and ibuprofen as needed  Lab ordered oxycodone and IV morphine as needed for breakthrough pain  Continuous pulse oximetry monitoring to monitor for hypoxia and respiratory depression while receiving IV narcotic medications.      Debility  Assessment & Plan  Patient unable to care for himself.  PT OT evaluation  Day team to get case management involved for placement    5/21/2024  Patient states that he does not feel that he could be discharged to his home as he lives on by himself.  PT recommending home health services.  Pending OT evaluatio    Leukocytosis- (present on admission)  Assessment & Plan  Likely reactive given his nausea vomiting.  No indication for antibiotics    5/21/2024  Trending down  Continue to hold antibiotics    Elevated troponin- (present on admission)  Assessment & Plan  Troponin of 27, which is decreased compared to his troponin level 2 weeks ago    Abdominal pain  Assessment & Plan  Chronic.  He has had previous imaging which was indicative of gastroparesis  Continue Reglan    Diabetes mellitus type 2, insulin dependent, poorly controlled with marked hyperglycemia (HCC)- (present on admission)  Assessment & Plan  Patient presents with hyperglycemia in the 400s.  History of medication noncompliance.  Beta-hydroxybutyrate uric acid levels were slightly elevated.  However he is not currently in DKA.  Continue with insulin sliding scale, hypoglycemia protocol, diabetic diet  Lantus 15 units nightly and    5/21/2024  Hemoglobin A1c of 12.5.  Poorly controlled.  Blood glucose improving to the 170s.  Continue glargine  insulin 20 units daily.    Acute esophagitis- (present on admission)  Assessment & Plan  5/21/2024  Improved with GI cocktail.  Start pantoprazole 40 mg IV twice daily    Hyponatremia- (present on admission)  Assessment & Plan  Pseudohyponatremia secondary to the hyperglycemia    5/21/2024  Resolved with normalization of blood glucose levels.         VTE prophylaxis: lovenox    I have performed a physical exam and reviewed and updated ROS and Plan today (5/22/2024). In review of yesterday's note (5/21/2024), there are no changes except as documented above.      Greater than 51 minutes spent prepping to see patient (e.g. review of tests) obtaining and/or reviewing separately obtained history. Performing a medically appropriate examination and evaluation.  Counseling and educating the patient/family/caregiver.  Ordering medications, tests, or procedures.  Referring and communicating with other health care professionals.  Documenting clinical information in EPIC.  Independently interpreting results and communicating results to patient/family/caregiver.  Care coordination.

## 2024-05-22 NOTE — DISCHARGE PLANNING
Case Management Discharge Planning    Admission Date: 5/20/2024  GMLOS: 4.6  ALOS: 2    6-Clicks ADL Score: 20  6-Clicks Mobility Score: 16  PT and/or OT Eval ordered: Yes  Post-acute Referrals Ordered: Yes  Post-acute Choice Obtained: Yes  Has referral(s) been sent to post-acute provider:  Yes      Anticipated Discharge Dispo: Discharge Disposition: D/T to SNF with Medicare cert in anticipation of skilled care (03)  Discharge Address: [address] [place] [address] [address] [place] [address]  Discharge Contact Phone Number: [phone]    DME Needed: No    Action(s) Taken: Referral(s) sent    This RN CM completed chart review and completed assessment and anticipate that patient will need SNF and GH placement. 6 clicks score is 16 sent referrals per protocol for SNF.     This RN CM called son Nickolas, confirmed that he does live in [facility]. Nickolas also spoke about concern for his dad living independently and stated that the most he could commit to would be seeing the patient once a week with this work schedule. Son was not aware that patient has been admitted to the hospital. Discussed that patient is concerned about not being able to care for self and might need SNF placement and GH.     Nickolas mentioned that his other siblings are not involved and cannot contribute towards coverage of GH. Discussed moving pt closer to son Nickolas would be helpful so he could check on his dad more often.     This RN CM confirmed with son that patient Elsie owns his house and that his income is 2200 per month. Discussed that GHs cost [phone] per month. Per son Nickolas he is not able to contribute income towards a group home and would consider using assets from elsie's home to help pay for a group home.     Discussed that we would work on SNF placement in the mean time and leslie Olmos will work on looking at group homes closer to his location for patient to go as the long term plan.    Escalations Completed: Pending Discharge Destination    Medically Clear:  No    Next Steps: pending OT recommendations for discharge.    Barriers to Discharge: Pending Placement    Is the patient up for discharge tomorrow: No

## 2024-05-22 NOTE — PROGRESS NOTES
[facility] Medicine Daily Progress Note    Date of Service  5/21/2024    Chief Complaint  Christoph Taylor is a 58 y.o. male admitted 5/20/2024 with abdominal, diarrhea, and nausea.    Hospital Course  Christoph Taylor is a 58 y.o. male with known history of poorly controlled diabetes mellitus secondary to medication noncompliance, debility and inability to care for self who presented 5/20/2024 with abdominal pain and back pain.  Patient states his abdominal pain is chronic in nature.  He was recently diagnosed with gastroparesis, but states that he has not been able to get his medications.  Patient states that he lives at home alone, and is unable to care for himself.  He does not leave the house, he has not gone to the pharmacy.  In terms of getting food, his sister will send him packages.    While in the emergency department patient was notably hyperglycemic.  He was found to be acutely hypoxic with oxygen saturations of 88 to 89%.  Chest x-ray was unremarkable.  Patient will be admitted for acute respiratory failure with hpyoxia and severe hyperglycemia    Interval Problem Update  5/21/2024  Patient was seen and examined on the telemetry floor.  He continues on continuous cardiac monitoring.  Systolic blood pressures dropping to 85/53 improving with 100 mL LR bolus.  Complaining of left upper quadrant pain.  Does report a history of GERD.  Denies any significant alcohol intake.  I am concerned about gastritis versus esophagitis.  I have changes famotidine to pantoprazole 40 mg IV twice daily.  Pain improved with GI cocktail.    CT scan of the abdomen and pelvis per my read shows significant amount of stool burden within the transverse colon.  Distended bladder noted.  I am also concerned about significant constipation versus stool impaction within the transverse colon.  Starting MiraLAX 3 times a day and docusate twice a day.    Patient states that he does not feel that he could be discharged to his home as  he lives on by himself.    PT recommending home health services.  Pending OT evaluation.      Patient is being transferred to medical floor.    I reviewed patient's discharge summary from [facility] on 1/28/2024, where he was hospitalized for 2 days.  Presented with urinary tract infection abdominal pain, urine growing Klebsiella resistant to ampicillin but sensitive to Macrobid.  Crowley catheter placed due to inability to void.  He was taking Flomax.  Imaging at that time also showing constipation and retained stool throughout the colon.  Symptoms improved with Reglan and MiraLAX followed by multiple bowel movements.  Abdominal pain resolved.  Was able to mobilize with a walker and wheelchair.        I have discussed this patient's plan of care and discharge plan at IDT rounds today with Case Management, Nursing, Nursing leadership, and other members of the IDT team.    Consultants/Specialty  none    Code Status  Full Code    Disposition  The patient is not medically cleared for discharge to home or a post-acute facility.  Anticipate discharge to: skilled nursing facility    I have placed the appropriate orders for post-discharge needs.    Review of Systems  Review of Systems   Constitutional:  Positive for malaise/fatigue. Negative for chills and fever.   Respiratory:  Negative for cough and shortness of breath.    Cardiovascular:  Negative for chest pain and palpitations.   Gastrointestinal:  Positive for diarrhea and nausea. Negative for abdominal pain and vomiting.   Musculoskeletal:  Negative for joint pain and myalgias.   Neurological:  Positive for weakness. Negative for dizziness and headaches.        Physical Exam  Temp:  [36.5 °C (97.7 °F)-36.8 °C (98.2 °F)] 36.6 °C (97.9 °F)  Pulse:  [64-80] 75  Resp:  [16-18] 18  BP: ()/(44-78) 96/44  SpO2:  [92 %-99 %] 95 %    Physical Exam  Vitals and nursing note reviewed.   Constitutional:       General: He is not in acute distress.     Appearance: He  is ill-appearing.   HENT:      Head: Normocephalic and atraumatic.      Mouth/Throat:      Mouth: Mucous membranes are moist.      Pharynx: Oropharynx is clear. No oropharyngeal exudate.   Eyes:      General: No scleral icterus.        Right eye: No discharge.         Left eye: No discharge.      Conjunctiva/sclera: Conjunctivae normal.   Cardiovascular:      Rate and Rhythm: Normal rate and regular rhythm.      Pulses: Normal pulses.      Heart sounds: Normal heart sounds. No murmur heard.  Pulmonary:      Effort: Pulmonary effort is normal. No respiratory distress.      Breath sounds: Normal breath sounds.   Abdominal:      General: Abdomen is flat. Bowel sounds are decreased. There is no distension.      Palpations: Abdomen is soft.      Tenderness: There is abdominal tenderness in the left upper quadrant. There is no guarding.   Musculoskeletal:         General: No swelling.      Cervical back: Neck supple. No tenderness.      Right lower leg: No edema.      Left lower leg: No edema.   Skin:     General: Skin is warm and dry.      Coloration: Skin is not pale.   Neurological:      Mental Status: He is alert and oriented to person, place, and time. Mental status is at baseline.      Motor: Weakness present.   Psychiatric:         Thought Content: Thought content normal.         Judgment: Judgment normal.         Fluids    Intake/Output Summary (Last 24 hours) at 5/21/2024 2011  Last data filed at 5/21/2024 0800  Gross per 24 hour   Intake 350 ml   Output 900 ml   Net -550 ml       Laboratory  Recent Labs     05/20/24  1731 05/21/24  0208   WBC 11.3* 10.5   RBC 4.26* 4.06*   HEMOGLOBIN 12.0* 11.3*   HEMATOCRIT 34.7* 33.4*   MCV 81.5 82.3   MCH 28.2 27.8   MCHC 34.6 33.8   RDW 39.2 39.8   PLATELETCT 257 274   MPV 8.2* 8.6*     Recent Labs     05/20/24  1731 05/21/24  0208   SODIUM 132* 138   POTASSIUM 4.6 4.3   CHLORIDE 95* 101   CO2 28 27   GLUCOSE 490* 357*   BUN 23* 21   CREATININE 0.60 0.64   CALCIUM 8.5 8.2*                    Imaging  DX-CHEST-PORTABLE (1 VIEW)   Final Result      No acute cardiac or pulmonary abnormalities are identified.           Assessment/Plan  * Acute respiratory failure with hypoxia (HCC)- (present on admission)  Assessment & Plan  Currently requiring 1L supplemental oxygen  CXR negative  No URI symptoms  Likely secondary to morphine dose in the ED. His oxygenation does improve after a few deep breaths, but then subsequently drops down to 88-89%  Continue to titrate off oxygen     5/21/2024  Requiring up to 2 LPM overnight weaning down to room air.  I believe this is due to splinting from abdominal pain.    Acute superficial gastritis without hemorrhage- (present on admission)  Assessment & Plan  5/21/2024  Concerning per history and physical exam  GI cocktail followed by pantoprazole 40 mg p.o. daily  Discontinued on famotidine    Impaction of intestine (HCC)- (present on admission)  Assessment & Plan  5/21/2024  CT scan of the abdomen and pelvis per my read shows significant amount of stool burden within the transverse colon.  I am also concerned about significant constipation versus stool impaction within the transverse colon.    Starting MiraLAX 3 times a day and docusate twice a day.    Intractable left upper quadrant abdominal pain- (present on admission)  Assessment & Plan  5/21/2024  Differential includes gastritis, esophagitis, and significant constipation versus stool impaction within the transverse colon.    CT scan of the abdomen and pelvis per my read shows significant amount of stool burden within the transverse colon.  I am also concerned about significant constipation versus stool impaction within the transverse colon.  Starting MiraLAX 3 times a day and docusate twice a day.    Schedule Tylenol 1 g every 6 hours  IV Toradol and ibuprofen as needed  Lab ordered oxycodone and IV morphine as needed for breakthrough pain  Continuous pulse oximetry monitoring to monitor for hypoxia  and respiratory depression while receiving IV narcotic medications.      Acute esophagitis- (present on admission)  Assessment & Plan  5/21/2024  Improved with GI cocktail.  Start pantoprazole 40 mg IV twice daily    Diabetes mellitus type 2, insulin dependent, poorly controlled with marked hyperglycemia (HCC)- (present on admission)  Assessment & Plan  Patient presents with hyperglycemia in the 400s.  History of medication noncompliance.  Beta-hydroxybutyrate uric acid levels were slightly elevated.  However he is not currently in DKA.  Continue with insulin sliding scale, hypoglycemia protocol, diabetic diet  Lantus 15 units nightly and    5/21/2024  Hemoglobin A1c of 12.5.  Poorly controlled.  Blood glucose improving to the 170s.  Continue glargine insulin 20 units daily.    Debility  Assessment & Plan  Patient unable to care for himself.  PT OT evaluation  Day team to get case management involved for placement    5/21/2024  Patient states that he does not feel that he could be discharged to his home as he lives on by himself.  PT recommending home health services.  Pending OT evaluatio    Leukocytosis- (present on admission)  Assessment & Plan  Likely reactive given his nausea vomiting.  No indication for antibiotics    5/21/2024  Trending down  Continue to hold antibiotics    Elevated troponin- (present on admission)  Assessment & Plan  Troponin of 27, which is decreased compared to his troponin level 2 weeks ago    Abdominal pain  Assessment & Plan  Chronic.  He has had previous imaging which was indicative of gastroparesis  Continue Reglan    Hyponatremia- (present on admission)  Assessment & Plan  Pseudohyponatremia secondary to the hyperglycemia    5/21/2024  Resolved with normalization of blood glucose levels.         VTE prophylaxis:   SCDs/TEDs   enoxaparin ppx      I have performed a physical exam and reviewed and updated ROS and Plan today (5/21/2024). In review of yesterday's note (5/20/2024), there  are no changes except as documented above.      Greater than 55 minutes spent prepping to see patient (e.g. review of tests) obtaining and/or reviewing separately obtained history. Performing a medically appropriate examination and evaluation.  Counseling and educating the patient/family/caregiver.  Ordering medications, tests, or procedures.  Referring and communicating with other health care professionals.  Documenting clinical information in EPIC.  Independently interpreting results and communicating results to patient/family/caregiver.  Care coordination.

## 2024-05-22 NOTE — ASSESSMENT & PLAN NOTE
5/21/2024  Differential includes gastritis, esophagitis, and significant constipation versus stool impaction within the transverse colon.    CT scan of the abdomen and pelvis per my read shows significant amount of stool burden within the transverse colon.  I am also concerned about significant constipation versus stool impaction within the transverse colon.  Starting MiraLAX 3 times a day and docusate twice a day.    Schedule Tylenol 1 g every 6 hours  IV Toradol and ibuprofen as needed  Lab ordered oxycodone and IV morphine as needed for breakthrough pain  Continuous pulse oximetry monitoring to monitor for hypoxia and respiratory depression while receiving IV narcotic medications.

## 2024-05-22 NOTE — FLOWSHEET NOTE
05/22/24 1346   Incentive Spirometry Treatment   Incentive Spirometer Volume 3000 mL   Chest Physiotherapy Treatment   $ PEP/CPT Performed PEP / Flutter     QID

## 2024-05-22 NOTE — CARE PLAN
Pt AO x4.  Pt denied pain during initial assessment, verbalizes that the pain happens frequently.  Call light and belongings within reach.  Bed locked and in lowest position.  Hourly rounding.  Needs currently met.           The patient is Stable - Low risk of patient condition declining or worsening    Shift Goals  Clinical Goals: blood glucose;pain control  Patient Goals: get better, have little pain  Family Goals: KATHERINE    Progress made toward(s) clinical / shift goals:    Pt glucose was monitored and controlled per MAR.  Pt pain did not exceed 5/10 during shift.       Problem: Knowledge Deficit - Standard  Goal: Patient and family/care givers will demonstrate understanding of plan of care, disease process/condition, diagnostic tests and medications  Outcome: Progressing     Problem: Pain - Standard  Goal: Alleviation of pain or a reduction in pain to the patient’s comfort goal  Outcome: Progressing     Problem: Fall Risk  Goal: Patient will remain free from falls  Outcome: Progressing

## 2024-05-22 NOTE — PROGRESS NOTES
4 Eyes Skin Assessment Completed by PENNIE Constantino and PENNIE Hogue.    Head WDL  Ears WDL  Nose WDL  Mouth WDL  Neck WDL  Breast/Chest WDL  Shoulder Blades WDL  Spine Scar  (R) Arm/Elbow/Hand WDL  (L) Arm/Elbow/Hand WDL  Abdomen WDL  Groin WDL  Scrotum/Coccyx/Buttocks WDL  (R) Leg Scabs  (L) Leg Abrasion from medical device, scab on knee  (R) Heel/Foot/Toe Dry, flaky  (L) Heel/Foot/Toe Discoloration, dry, flaky          Devices In Places Orthotic at bedside      Interventions In Place N/A    Possible Skin Injury No    Pictures Uploaded Into Epic Yes  Wound Consult Placed N/A  RN Wound Prevention Protocol Ordered No

## 2024-05-22 NOTE — ASSESSMENT & PLAN NOTE
5/21/2024  CT scan of the abdomen and pelvis per my read shows significant amount of stool burden within the transverse colon.  I am also concerned about significant constipation versus stool impaction within the transverse colon.    Starting MiraLAX 3 times a day and docusate twice a day.

## 2024-05-22 NOTE — ASSESSMENT & PLAN NOTE
5/21/2024  Concerning per history and physical exam  GI cocktail followed by pantoprazole 40 mg p.o. daily  Discontinued on famotidine

## 2024-05-22 NOTE — HOSPITAL COURSE
Christoph Taylor is a 58 y.o. male with known history of poorly controlled diabetes mellitus secondary to medication noncompliance, debility and inability to care for self who presented 5/20/2024 with abdominal pain and back pain.  Patient states his abdominal pain is chronic in nature.  He was recently diagnosed with gastroparesis, but states that he has not been able to get his medications.  Patient states that he lives at home alone, and is unable to care for himself.  He does not leave the house, he has not gone to the pharmacy.  In terms of getting food, his sister will send him packages.    While in the emergency department patient was notably hyperglycemic.  He was found to be acutely hypoxic with oxygen saturations of 88 to 89%.  Chest x-ray was unremarkable.  Patient will be admitted for acute respiratory failure with hpyoxia and severe hyperglycemia

## 2024-05-23 ENCOUNTER — PHARMACY VISIT (OUTPATIENT)
Dept: PHARMACY | Facility: MEDICAL CENTER | Age: 59
End: 2024-05-23
Payer: COMMERCIAL

## 2024-05-23 VITALS
RESPIRATION RATE: 17 BRPM | WEIGHT: 163.14 LBS | HEART RATE: 70 BPM | SYSTOLIC BLOOD PRESSURE: 132 MMHG | TEMPERATURE: 98.4 F | OXYGEN SATURATION: 96 % | HEIGHT: 72 IN | BODY MASS INDEX: 22.1 KG/M2 | DIASTOLIC BLOOD PRESSURE: 83 MMHG

## 2024-05-23 LAB
GLUCOSE BLD STRIP.AUTO-MCNC: 154 MG/DL (ref 65–99)
GLUCOSE BLD STRIP.AUTO-MCNC: 224 MG/DL (ref 65–99)
GLUCOSE BLD STRIP.AUTO-MCNC: 92 MG/DL (ref 65–99)

## 2024-05-23 PROCEDURE — 99239 HOSP IP/OBS DSCHRG MGMT >30: CPT | Performed by: STUDENT IN AN ORGANIZED HEALTH CARE EDUCATION/TRAINING PROGRAM

## 2024-05-23 PROCEDURE — RXMED WILLOW AMBULATORY MEDICATION CHARGE: Performed by: STUDENT IN AN ORGANIZED HEALTH CARE EDUCATION/TRAINING PROGRAM

## 2024-05-23 RX ORDER — PANTOPRAZOLE SODIUM 40 MG/1
40 TABLET, DELAYED RELEASE ORAL 2 TIMES DAILY
Qty: 60 TABLET | Refills: 0 | Status: ON HOLD | OUTPATIENT
Start: 2024-05-23 | End: 2024-06-22

## 2024-05-23 RX ORDER — OXYCODONE HYDROCHLORIDE 5 MG/1
5 TABLET ORAL EVERY 6 HOURS PRN
Qty: 12 TABLET | Refills: 0 | Status: SHIPPED | OUTPATIENT
Start: 2024-05-23 | End: 2024-05-26

## 2024-05-23 RX ADMIN — ACETAMINOPHEN 1000 MG: 500 TABLET, FILM COATED ORAL at 04:54

## 2024-05-23 RX ADMIN — KETOROLAC TROMETHAMINE 15 MG: 15 INJECTION, SOLUTION INTRAMUSCULAR; INTRAVENOUS at 04:54

## 2024-05-23 RX ADMIN — ACETAMINOPHEN 1000 MG: 500 TABLET, FILM COATED ORAL at 12:30

## 2024-05-23 RX ADMIN — INSULIN GLARGINE-YFGN 20 UNITS: 100 INJECTION, SOLUTION SUBCUTANEOUS at 05:01

## 2024-05-23 RX ADMIN — INSULIN LISPRO 1 UNITS: 100 INJECTION, SOLUTION INTRAVENOUS; SUBCUTANEOUS at 08:51

## 2024-05-23 RX ADMIN — PANTOPRAZOLE SODIUM 40 MG: 40 INJECTION, POWDER, FOR SOLUTION INTRAVENOUS at 04:55

## 2024-05-23 RX ADMIN — KETOROLAC TROMETHAMINE 15 MG: 15 INJECTION, SOLUTION INTRAMUSCULAR; INTRAVENOUS at 12:30

## 2024-05-23 ASSESSMENT — COGNITIVE AND FUNCTIONAL STATUS - GENERAL
SUGGESTED CMS G CODE MODIFIER MOBILITY: CH
EATING MEALS: A LITTLE
DAILY ACTIVITIY SCORE: 16
PERSONAL GROOMING: A LITTLE
MOBILITY SCORE: 24
DRESSING REGULAR LOWER BODY CLOTHING: TOTAL
TOILETING: A LITTLE
SUGGESTED CMS G CODE MODIFIER DAILY ACTIVITY: CK
HELP NEEDED FOR BATHING: A LITTLE
DRESSING REGULAR UPPER BODY CLOTHING: A LITTLE

## 2024-05-23 ASSESSMENT — ACTIVITIES OF DAILY LIVING (ADL): TOILETING: INDEPENDENT

## 2024-05-23 ASSESSMENT — PAIN DESCRIPTION - PAIN TYPE: TYPE: ACUTE PAIN

## 2024-05-23 ASSESSMENT — GAIT ASSESSMENTS
ASSISTIVE DEVICE: FRONT WHEEL WALKER
DISTANCE (FEET): 100
GAIT LEVEL OF ASSIST: SUPERVISED

## 2024-05-23 NOTE — CARE PLAN
"Pt AO x4.  Pt denied pain during initial assessment.  Call light and belongings within reach.  Bed locked and in lowest position.  Hourly rounding.  Needs currently met.           The patient is Stable - Low risk of patient condition declining or worsening    Shift Goals  Clinical Goals: monitor blood glucose and bowel movements  Patient Goals: \"snacks and figure out what's going on\"  Family Goals: KATHERINE    Progress made toward(s) clinical / shift goals:    Pt blood glucose controlled per MAR.  Pt did not have any bowel movements during shift.   Pt discharged successfully prior to end of shift.       Problem: Knowledge Deficit - Standard  Goal: Patient and family/care givers will demonstrate understanding of plan of care, disease process/condition, diagnostic tests and medications  Outcome: Progressing     Problem: Fall Risk  Goal: Patient will remain free from falls  Outcome: Progressing     Problem: Pain - Standard  Goal: Alleviation of pain or a reduction in pain to the patient’s comfort goal  Outcome: Progressing         "

## 2024-05-23 NOTE — THERAPY
"Physical Therapy   Daily Treatment     Patient Name: Christoph Taylor  Age:  58 y.o., Sex:  male  Medical Record #: [id number]  Today's Date: 5/23/2024     Precautions  Precautions: Fall Risk  Comments: Pt reports his R knee nitin and wears an AFO on his L foot    Assessment    Asked to see this pt again.  Initially seen on 5/21/2024 by PT and d/c'd from service.  Upon entry, pt very angry and frustrated.  Yelling at PT, that no one cares about him, and accusing PT of being negative and \"against him\".  After explaining that I was solely present to assist him, as I was told that he was eager to work w/ PT.  He did calm down after some time and was pleasant the remaining time.  He is able to move to/from the eob w/o assist and w/o use of bed features.  He is able to maintain his balance at the eob, in fact, leaning far forward towards his feet in order to don his AFO and shoes, which he was able to perform w/o assist.  He is able to stand and ambulate 100 ft w/ a fww w/o assist and w/o loss of balance.  He was also able to perform stairs w/o assist.  He does appear to be at his PLOF.  He in fact reports wearing his AFO's and having LE issues for 5-6 years, as was also noted by the previous PT.  He does not present w/ an acute PT need at this time.  Unfortunately, he does live alone and would most benefit from a setting w/ increased spv and assistance ie. A group home.  PT will no longer formally follow.  Recommend oob/amb prn w/ nsg and fww.      Plan    Treatment Plan Status:    Type of Treatment:    Treatment Frequency:    Treatment Duration: Discharge Needs Only    DC Equipment Recommendations: None  Discharge Recommendations: Recommend home health for continued physical therapy services        Objective       05/23/24 1331   Balance   Sitting Balance (Static) Fair +   Sitting Balance (Dynamic) Fair +   Standing Balance (Static) Fair   Standing Balance (Dynamic) Fair   Weight Shift Sitting Good   Weight Shift " Standing Good   Skilled Intervention Verbal Cuing   Comments w/ fww   Bed Mobility    Supine to Sit Supervised   Sit to Supine Supervised   Skilled Intervention Verbal Cuing   Gait Analysis   Gait Level Of Assist Supervised   Assistive Device Front Wheel Walker   Distance (Feet) 100   # of Stairs Climbed 10   Level of Assist with Stairs Supervised   Skilled Intervention Verbal Cuing   Functional Mobility   Sit to Stand Supervised   Skilled Intervention Verbal Cuing   Physical Therapy Treatment Plan   Duration Discharge Needs Only   Anticipated Discharge Equipment and Recommendations   DC Equipment Recommendations None   Discharge Recommendations Recommend home health for continued physical therapy services

## 2024-05-23 NOTE — DISCHARGE SUMMARY
Discharge Summary    CHIEF COMPLAINT ON ADMISSION  Chief Complaint   Patient presents with    Abdominal Pain     luq    Diarrhea    Nausea       Reason for Admission  EMS     Admission Date  5/20/2024    CODE STATUS  Full Code    HPI & HOSPITAL COURSE  This is a 58 y.o. male with known history of poorly controlled diabetes mellitus secondary to medication noncompliance, debility and inability to care for self who presented 5/20/2024 with abdominal pain and back pain.  Patient states his abdominal pain is chronic in nature.  He was recently diagnosed with gastroparesis, but states that he has not been able to get his medications.  Patient states that he lives at home alone, and is unable to care for himself.  He does not leave the house, he has not gone to the pharmacy.  In terms of getting food, his sister will send him packages.     While in the emergency department patient was notably hyperglycemic.  He was found to be acutely hypoxic with oxygen saturations of 88 to 89%.  Chest x-ray was unremarkable. Patient's O2 has been weaned off successfully. On the day of discharge, he is on room air. No sign of respiratory distress.     Regards his chronic abdominal pain, he had CT abdomen on 5/6 which showed mild distal esophageal wall thickening, question esophagitis. He is started with PPI. Patient has been tolerating diet. No nausea or vomiting. No hematemesis or melena.     He is noted to have uncontrolled DM. Q1c 12.2. He is on lantus and SSI. He is advised to follow up with PCP as outpatient.     Patient is evaluated by PT/OT. HHC was recommended, Caregiver resources were provided. HHC [facility] to be resumed on discharge, and meals on wheels. CM also reached out to community health worker to follow pt once patient is discharged.     Therefore, he is discharged in fair and stable condition to home with organized home healthcare and close outpatient follow-up.    The patient met 2-midnight criteria for an inpatient  stay at the time of discharge.    Discharge Date  5/23/24    FOLLOW UP ITEMS POST DISCHARGE  PCP    DISCHARGE DIAGNOSES  Principal Problem:    Acute respiratory failure with hypoxia (HCC) (POA: Yes)  Active Problems:    Hyponatremia (POA: Yes)    Acute esophagitis (POA: Yes)    Diabetes mellitus type 2, insulin dependent, poorly controlled with marked hyperglycemia (HCC) (POA: Yes)    Elevated troponin (POA: Yes)    Leukocytosis (POA: Yes)    Debility (POA: Unknown)    Intractable left upper quadrant abdominal pain (POA: Yes)    Impaction of intestine (HCC) (POA: Yes)    Acute superficial gastritis without hemorrhage (POA: Yes)  Resolved Problems:    * No resolved hospital problems. *      FOLLOW UP  No future appointments.  Orin Darnell, A.P.R.N.  [id number] [id number] [phone]    Follow up in 1 week(s)        MEDICATIONS ON DISCHARGE     Medication List        START taking these medications        Instructions   pantoprazole 40 MG Tbec  Commonly known as: Protonix   Take 1 Tablet by mouth 2 times a day for 30 days.  Dose: 40 mg            CONTINUE taking these medications        Instructions   aspirin 81 MG EC tablet   Take 81 mg by mouth every evening.  Dose: 81 mg     Lantus SoloStar 100 UNIT/ML Sopn injection  Generic drug: insulin glargine   Inject 20 Units under the skin 2 times a day.  Dose: 20 Units     multivitamin Tabs   Take 1 Tablet by mouth every evening.  Dose: 1 Tablet     oxyCODONE immediate-release 5 MG Tabs  Commonly known as: Roxicodone   Take 5 mg by mouth every four hours as needed for Severe Pain.  Dose: 5 mg            STOP taking these medications      famotidine 20 MG Tabs  Commonly known as: Pepcid              Allergies  No Known Allergies    DIET  Orders Placed This Encounter   Procedures    Diet Order Diet: Consistent CHO (Diabetic); Tray Modifications (optional): Double Portions     Standing Status:   Standing     Number of Occurrences:   1     Order  Specific Question:   Diet:     Answer:   Consistent CHO (Diabetic) [4]     Order Specific Question:   Tray Modifications (optional)     Answer:   Double Portions       ACTIVITY  As tolerated.  Weight bearing as tolerated    CONSULTATIONS  na    PROCEDURES  na    LABORATORY  Lab Results   Component Value Date    SODIUM 139 05/22/2024    POTASSIUM 4.2 05/22/2024    CHLORIDE 101 05/22/2024    CO2 28 05/22/2024    GLUCOSE 129 (H) 05/22/2024    BUN 29 (H) 05/22/2024    CREATININE 0.88 05/22/2024        Lab Results   Component Value Date    WBC 5.3 05/22/2024    HEMOGLOBIN 11.0 (L) 05/22/2024    HEMATOCRIT 32.3 (L) 05/22/2024    PLATELETCT 232 05/22/2024      DX-CHEST-PORTABLE (1 VIEW)   Final Result      No acute cardiac or pulmonary abnormalities are identified.            Total time of the discharge process 35 minutes.

## 2024-05-23 NOTE — DISCHARGE PLANNING
Received Choice form at   Agency/Facility Name: [facility]  Referral sent per Choice form @ [phone]

## 2024-05-23 NOTE — CARE PLAN
The patient is Stable - Low risk of patient condition declining or worsening    Shift Goals  Clinical Goals: pt will have blood glucose monitored, pain managed throughout shift  Patient Goals: shower, nutrition, pain management  Family Goals: KATHERINE    Progress made toward(s) clinical / shift goals:  Pt alert and oriented, uses call light when in need of assistance. Medications administered per MAR. Pt able to ambulate to shower with FWW, standby assist, and tolerated well. Pt remained free of fall and injury this shift. Other needs met at this time. Bed locked in lowest position, call light in reach.    Patient is not progressing towards the following goals:

## 2024-05-23 NOTE — DISCHARGE INSTRUCTIONS
Discharge Instructions per Jean Paul Sultana M.D.    Please follow-up with PCP as outpatient in one week  Please take Protonix twice a day for at least one month for esophagitis and follow up with GI as outpatient  I have referred you to see GI as outpatient    Return to ER in the event of new or worsening symptoms. Please note importance of compliance and the patient has agreed to proceed with all medical recommendations and follow up plan indicated above. All medications come with benefits and risks. Risks may include permanent injury or death and these risks can be minimized with close reassessment and monitoring. Please make it to your scheduled follow ups with PCP and GI

## 2024-05-23 NOTE — THERAPY
Occupational Therapy   Initial Evaluation     Patient Name: Christoph Taylor  Age:  58 y.o., Sex:  male  Medical Record #: [id number]  Today's Date: 5/23/2024     Precautions  Precautions: Fall Risk  Comments: Pt reports his R knee nitin and wears an AFO on his L foot    Assessment  Patient is 58 y.o. male admitted for abdominal pain, diarrhea, and nausea. PMHx of diabetes and chronic back pain. Pt normally independent in ADLs, however, pt reports not leaving his house. Pt stated that he gets groceries delivered to his home. Pt reports that he is very sad and concerned for his dog. Pt able to stand pivot transfer to bedside commode with min A. Pt completed LB dressing total A due to limited R hand function, grooming seat SPV, and UB dressing SPV. Pt will benefit from skilled OT services while admitted and recommend post-acute placement.     Plan    Occupational Therapy Initial Treatment Plan   Treatment Interventions: Self Care / Activities of Daily Living, Adaptive Equipment, Therapeutic Exercises, Therapeutic Activity, Community Re-Integration  Treatment Frequency: 3 Times per Week  Duration: Until Therapy Goals Met    DC Equipment Recommendations: Unable to determine at this time  Discharge Recommendations: Recommend post-acute placement for additional occupational therapy services prior to discharge home        Objective     05/23/24 1039   Initial Contact Note    Initial Contact Note Order Received and Verified, Occupational Therapy Evaluation in Progress with Full Report to Follow.   Prior Living Situation   Prior Services None   Housing / Facility Mobile Home   Steps Into Home 6   Steps In Home 0   Rail Unable To Determine At This Time   Elevator No   Bathroom Set up Walk In Shower;Bathtub / Shower Combination;Grab Bars  (Pt reports grab bars are near toilet not the shower.)   Equipment Owned Wheelchair;Front-Wheel Walker;Grab Bar(s) By Toilet   Lives with - Patient's Self Care Capacity Alone and Unable to  Care For Self   Comments Pt reports son may be able to take care of his dog.   Prior Level of ADL Function   Self Feeding Independent   Grooming / Hygiene Independent   Bathing Independent   Dressing Independent   Toileting Independent   Prior Level of IADL Function   Shopping Dependent  (Pt reports that he gets groceries delivered to his home)   Prior Level Of Mobility Uses Wheel Chair for in Home Mobility   Driving / Transportation Unable To Determine At This Time;Other (Comments)  (does not currently leave home)   Occupation (Pre-Hospital Vocational) Retired Due To Age   Leisure Interests Pets   Comments Pt was able to take care of his dog.   Precautions   Precautions Fall Risk   Comments Pt reports his R knee nitin and wears an AFO on his L foot   Pain 0 - 10 Group   Location Abdomen;Back   Location Orientation Anterior;Posterior   Therapist Pain Assessment Post Activity Pain Same as Prior to Activity   Cognition    Cognition / Consciousness WDL   Level of Consciousness Alert   Comments Cooperative, depressed, worried about animal   Passive ROM Upper Body   Passive ROM Upper Body WDL   Active ROM Upper Body   Active ROM Upper Body  X   Dominant Hand Right   Rt Hand Moderate Impaired   ROM Lt Hand Moderate Impaired   Comments Pt reports he cant fully open his hands, history of r thumb joint collapse IP   Strength Upper Body   Upper Body Strength  WDL   Sensation Upper Body   Upper Extremity Sensation  Not Tested   Upper Body Muscle Tone   Upper Body Muscle Tone  WDL   Balance Assessment   Sitting Balance (Static) Fair   Sitting Balance (Dynamic) Fair   Standing Balance (Static) Poor -   Standing Balance (Dynamic) Poor -   Weight Shift Sitting Fair   Weight Shift Standing Poor   Comments w/ HHA   Bed Mobility    Supine to Sit Contact Guard Assist   Sit to Supine Supervised   Scooting Supervised   Rolling Standby Assist   ADL Assessment   Grooming Independent   Upper Body Dressing Supervision   Lower Body  "Dressing Total Assist   Comments Pt unable to don socks, AFO, and shoes   How much help from another person does the patient currently need...   Putting on and taking off regular lower body clothing? 1   Bathing (including washing, rinsing, and drying)? 3   Toileting, which includes using a toilet, bedpan, or urinal? 3   Putting on and taking off regular upper body clothing? 3   Taking care of personal grooming such as brushing teeth? 3   Eating meals? 3   6 Clicks Daily Activity Score 16   Functional Mobility   Sit to Stand Contact Guard Assist   Toilet Transfers Minimal Assist   Tub / Shower Transfers   (commode at bedside)   Transfer Method Stand Pivot   Visual Perception   Visual Perception  WDL   Activity Tolerance   Sitting in Chair   (<3 mins on commode at bedside)   Sitting Edge of Bed <5 mins during ADLs   Standing <1 min during stand pivot   Patient / Family Goals   Patient / Family Goal #1 \"to go home to my dog\"   Short Term Goals   Short Term Goal # 1 Pt will complete LB dressing Mod A   Short Term Goal # 2 Pt will complete ADL transfer SPV   Short Term Goal # 3 Pt will complete grooming task standing SPV   Education Group   Education Provided Role of Occupational Therapist;Activities of Daily Living;Use of Call Light   Role of Occupational Therapist Patient Response Patient;Acceptance;Explanation   ADL Patient Response Patient;Acceptance;Explanation;Verbal Demonstration   Use of Call Light Patient Response Patient;Acceptance;Explanation;Demonstration   Occupational Therapy Initial Treatment Plan    Treatment Interventions Self Care / Activities of Daily Living;Adaptive Equipment;Therapeutic Exercises;Therapeutic Activity;Community Re-Integration   Treatment Frequency 3 Times per Week   Duration Until Therapy Goals Met   Problem List   Problem List Decreased Functional Mobility;Decreased Active Daily Living Skills;Impaired Posture / Trunk Alignment;Decreased Upper Extremity AROM Right;Decreased Upper " Extremity AROM Left;Decreased Activity Tolerance;Impaired Postural Control / Balance   Anticipated Discharge Equipment and Recommendations   DC Equipment Recommendations Unable to determine at this time   Discharge Recommendations Recommend post-acute placement for additional occupational therapy services prior to discharge home   Interdisciplinary Plan of Care Collaboration   IDT Collaboration with  Nursing   Patient Position at End of Therapy In Bed;Call Light within Reach;Tray Table within Reach;Phone within Reach   Collaboration Comments RN updated

## 2024-05-23 NOTE — DISCHARGE PLANNING
DC Transport Scheduled    Transport Company Scheduled:  [facility]  Spoke with Ivanna at [facility] to schedule transport.    Scheduled Date: 5/23/2024  Scheduled Time: 1630    Destination: Home   Destination address:  [address] [place] [id number]    Notified care team of scheduled transport via Voalte.     If there are any changes needed to the DC transportation scheduled, please contact [facility] at ext. [phone] between the hours of 3628-6145 Mon-Fri. If outside those hours, contact the ED Case Manager at ext. [phone].

## 2024-05-23 NOTE — DISCHARGE PLANNING
Case Management Discharge Planning    Admission Date: 5/20/2024  GMLOS: 4.6  ALOS: 3    6-Clicks ADL Score: 20  6-Clicks Mobility Score: 16  PT and/or OT Eval ordered: Yes  Post-acute Referrals Ordered: Yes  Post-acute Choice Obtained: Yes  Has referral(s) been sent to post-acute provider:  Yes      Anticipated Discharge Dispo: Discharge Disposition: D/T to SNF with Medicare cert in anticipation of skilled care (03)  Discharge Address: [address] [place] [address] [place] [address]  Discharge Contact Phone Number: [phone]    DME Needed: No    Action(s) Taken: Referral(s) sent and OTHER    This RN CM was updated that patient is medically cleared pending OT recommendations. Therapy team was updated on need for assessment.     Updated pt on plan for discharge to SNF placement pending therapy recommendations, discussed if therapy does not suggest SNF placement then pt will go home with HH and work on relocation to [place] group home closer to his son.     This RN CM called [facility] and spoke with Ayde on discharge plan for SNF and son is working on group home in [place] with the plan to sell his home here and use [employer] to cover GH cost in [place].    Ayde asked if we can resend referral over to take a second look. Updated DPA and referral was resent.    PT is not recommending SNF OT is recommending SNF placement, insurance will not cover placement for OT needs only.     This RN CM verified pt cell phone number at [phone] is correct.     This RN CM updated pt and offered caregiver resources, HH thru [facility] to be resumed on discharge, and meals on wheels. This RN CM also reached out to community health worker to follow pt once pt is discharge.    Son was updated that patient is anticipated to discharge home with HH. Son is coming up to see patient this weekend.       Escalations Completed: Pending Discharge Destination    Medically Clear: Yes    Next Steps: pending OT recomendations    Barriers to Discharge: Pending Placement    Is  the patient up for discharge tomorrow: No

## 2024-05-24 LAB
BACTERIA STL CULT: NORMAL
E COLI SXT1+2 STL IA: NORMAL
SIGNIFICANT IND 70042: NORMAL
SITE SITE: NORMAL
SOURCE SOURCE: NORMAL

## 2024-05-27 ENCOUNTER — APPOINTMENT (OUTPATIENT)
Dept: RADIOLOGY | Facility: MEDICAL CENTER | Age: 59
DRG: 459 | End: 2024-05-27
Attending: EMERGENCY MEDICINE
Payer: COMMERCIAL

## 2024-05-27 ENCOUNTER — HOSPITAL ENCOUNTER (INPATIENT)
Facility: MEDICAL CENTER | Age: 59
End: 2024-05-27
Attending: EMERGENCY MEDICINE | Admitting: HOSPITALIST
Payer: COMMERCIAL

## 2024-05-27 DIAGNOSIS — R09.02 HYPOXIA: ICD-10-CM

## 2024-05-27 DIAGNOSIS — R73.9 HYPERGLYCEMIA: ICD-10-CM
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
## 2024-05-27 DIAGNOSIS — J18.9 PNEUMONIA OF BOTH LUNGS DUE TO INFECTIOUS ORGANISM, UNSPECIFIED PART OF LUNG: ICD-10-CM

## 2024-05-27 DIAGNOSIS — R26.89 FUNCTIONAL GAIT ABNORMALITY: ICD-10-CM
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
## 2024-05-27 DIAGNOSIS — M48.02 CERVICAL STENOSIS OF SPINAL CANAL: ICD-10-CM

## 2024-05-27 DIAGNOSIS — F32.A DEPRESSION, UNSPECIFIED DEPRESSION TYPE: ICD-10-CM

## 2024-05-27 LAB
AMPHET UR QL SCN: NEGATIVE
ANION GAP SERPL CALC-SCNC: 13 MMOL/L (ref 7–16)
APPEARANCE UR: CLEAR
BACTERIA #/AREA URNS HPF: ABNORMAL /HPF
BARBITURATES UR QL SCN: NEGATIVE
BASE EXCESS BLDV CALC-SCNC: 2 MMOL/L
BASOPHILS # BLD AUTO: 0 % (ref 0–1.8)
BASOPHILS # BLD: 0 K/UL (ref 0–0.12)
BENZODIAZ UR QL SCN: NEGATIVE
BILIRUB UR QL STRIP.AUTO: NEGATIVE
BODY TEMPERATURE: 36.7 CENTIGRADE
BUN SERPL-MCNC: 28 MG/DL (ref 8–22)
BZE UR QL SCN: NEGATIVE
CALCIUM SERPL-MCNC: 8.3 MG/DL (ref 8.5–10.5)
CANNABINOIDS UR QL SCN: NEGATIVE
CHLORIDE SERPL-SCNC: 94 MMOL/L (ref 96–112)
CO2 SERPL-SCNC: 25 MMOL/L (ref 20–33)
COLOR UR: YELLOW
CREAT SERPL-MCNC: 0.74 MG/DL (ref 0.5–1.4)
EKG IMPRESSION: NORMAL
EOSINOPHIL # BLD AUTO: 0.1 K/UL (ref 0–0.51)
EOSINOPHIL NFR BLD: 2 % (ref 0–6.9)
EPI CELLS #/AREA URNS HPF: ABNORMAL /HPF
ERYTHROCYTE [DISTWIDTH] IN BLOOD BY AUTOMATED COUNT: 39.8 FL (ref 35.9–50)
ETHANOL BLD-MCNC: <10.1 MG/DL
FENTANYL UR QL: NEGATIVE
GFR SERPLBLD CREATININE-BSD FMLA CKD-EPI: 104 ML/MIN/1.73 M 2
GLUCOSE BLD STRIP.AUTO-MCNC: 267 MG/DL (ref 65–99)
GLUCOSE BLD STRIP.AUTO-MCNC: 501 MG/DL (ref 65–99)
GLUCOSE SERPL-MCNC: 582 MG/DL (ref 65–99)
GLUCOSE UR STRIP.AUTO-MCNC: >=1000 MG/DL
HCO3 BLDV-SCNC: 26 MMOL/L (ref 24–28)
HCT VFR BLD AUTO: 32.1 % (ref 42–52)
HGB BLD-MCNC: 10.6 G/DL (ref 14–18)
HYALINE CASTS #/AREA URNS LPF: ABNORMAL /LPF
IMM GRANULOCYTES # BLD AUTO: 0.02 K/UL (ref 0–0.11)
IMM GRANULOCYTES NFR BLD AUTO: 0.4 % (ref 0–0.9)
INHALED O2 FLOW RATE: ABNORMAL L/MIN
KETONES UR STRIP.AUTO-MCNC: NEGATIVE MG/DL
LEUKOCYTE ESTERASE UR QL STRIP.AUTO: NEGATIVE
LYMPHOCYTES # BLD AUTO: 0.96 K/UL (ref 1–4.8)
LYMPHOCYTES NFR BLD: 19.1 % (ref 22–41)
MAGNESIUM SERPL-MCNC: 1.6 MG/DL (ref 1.5–2.5)
MCH RBC QN AUTO: 27.7 PG (ref 27–33)
MCHC RBC AUTO-ENTMCNC: 33 G/DL (ref 32.3–36.5)
MCV RBC AUTO: 83.8 FL (ref 81.4–97.8)
METHADONE UR QL SCN: NEGATIVE
MICRO URNS: ABNORMAL
MONOCYTES # BLD AUTO: 0.41 K/UL (ref 0–0.85)
MONOCYTES NFR BLD AUTO: 8.2 % (ref 0–13.4)
MUCOUS THREADS #/AREA URNS HPF: ABNORMAL /HPF
NEUTROPHILS # BLD AUTO: 3.54 K/UL (ref 1.82–7.42)
NEUTROPHILS NFR BLD: 70.3 % (ref 44–72)
NITRITE UR QL STRIP.AUTO: NEGATIVE
NRBC # BLD AUTO: 0 K/UL
NRBC BLD-RTO: 0 /100 WBC (ref 0–0.2)
OPIATES UR QL SCN: POSITIVE
OXYCODONE UR QL SCN: NEGATIVE
PCO2 BLDV: 39 MMHG (ref 41–51)
PCO2 TEMP ADJ BLDV: 38.5 MMHG (ref 41–51)
PCP UR QL SCN: NEGATIVE
PH BLDV: 7.45 [PH] (ref 7.31–7.45)
PH TEMP ADJ BLDV: 7.45 [PH] (ref 7.31–7.45)
PH UR STRIP.AUTO: 6 [PH] (ref 5–8)
PLATELET # BLD AUTO: 190 K/UL (ref 164–446)
PMV BLD AUTO: 9.3 FL (ref 9–12.9)
PO2 BLDV: 70.1 MMHG (ref 25–40)
PO2 TEMP ADJ BLDV: 68.7 MMHG (ref 25–40)
POC BREATHALIZER: 0 PERCENT (ref 0–0.01)
POTASSIUM SERPL-SCNC: 4.7 MMOL/L (ref 3.6–5.5)
PROPOXYPH UR QL SCN: NEGATIVE
PROT UR QL STRIP: NEGATIVE MG/DL
RBC # BLD AUTO: 3.83 M/UL (ref 4.7–6.1)
RBC # URNS HPF: ABNORMAL /HPF
RBC UR QL AUTO: ABNORMAL
SAO2 % BLDV: 93.5 %
SODIUM SERPL-SCNC: 132 MMOL/L (ref 135–145)
SP GR UR STRIP.AUTO: 1.01
UROBILINOGEN UR STRIP.AUTO-MCNC: 0.2 MG/DL
WBC # BLD AUTO: 5 K/UL (ref 4.8–10.8)
WBC #/AREA URNS HPF: ABNORMAL /HPF

## 2024-05-27 PROCEDURE — 93005 ELECTROCARDIOGRAM TRACING: CPT

## 2024-05-27 PROCEDURE — 96374 THER/PROPH/DIAG INJ IV PUSH: CPT

## 2024-05-27 PROCEDURE — 71260 CT THORAX DX C+: CPT

## 2024-05-27 PROCEDURE — 700117 HCHG RX CONTRAST REV CODE 255: Performed by: EMERGENCY MEDICINE

## 2024-05-27 PROCEDURE — 82962 GLUCOSE BLOOD TEST: CPT

## 2024-05-27 PROCEDURE — 82077 ASSAY SPEC XCP UR&BREATH IA: CPT

## 2024-05-27 PROCEDURE — 96375 TX/PRO/DX INJ NEW DRUG ADDON: CPT

## 2024-05-27 PROCEDURE — 700111 HCHG RX REV CODE 636 W/ 250 OVERRIDE (IP): Mod: JZ | Performed by: EMERGENCY MEDICINE

## 2024-05-27 PROCEDURE — 83036 HEMOGLOBIN GLYCOSYLATED A1C: CPT

## 2024-05-27 PROCEDURE — 81001 URINALYSIS AUTO W/SCOPE: CPT

## 2024-05-27 PROCEDURE — 72128 CT CHEST SPINE W/O DYE: CPT

## 2024-05-27 PROCEDURE — 80048 BASIC METABOLIC PNL TOTAL CA: CPT

## 2024-05-27 PROCEDURE — 83735 ASSAY OF MAGNESIUM: CPT

## 2024-05-27 PROCEDURE — 82803 BLOOD GASES ANY COMBINATION: CPT

## 2024-05-27 PROCEDURE — 93005 ELECTROCARDIOGRAM TRACING: CPT | Performed by: EMERGENCY MEDICINE

## 2024-05-27 PROCEDURE — 700105 HCHG RX REV CODE 258: Performed by: EMERGENCY MEDICINE

## 2024-05-27 PROCEDURE — A9270 NON-COVERED ITEM OR SERVICE: HCPCS | Performed by: EMERGENCY MEDICINE

## 2024-05-27 PROCEDURE — 72131 CT LUMBAR SPINE W/O DYE: CPT

## 2024-05-27 PROCEDURE — 770006 HCHG ROOM/CARE - MED/SURG/GYN SEMI*

## 2024-05-27 PROCEDURE — 700105 HCHG RX REV CODE 258: Performed by: HOSPITALIST

## 2024-05-27 PROCEDURE — 302970 POC BREATHALIZER: Performed by: EMERGENCY MEDICINE

## 2024-05-27 PROCEDURE — 85025 COMPLETE CBC W/AUTO DIFF WBC: CPT

## 2024-05-27 PROCEDURE — 80307 DRUG TEST PRSMV CHEM ANLYZR: CPT

## 2024-05-27 PROCEDURE — 700102 HCHG RX REV CODE 250 W/ 637 OVERRIDE(OP): Performed by: EMERGENCY MEDICINE

## 2024-05-27 PROCEDURE — 36415 COLL VENOUS BLD VENIPUNCTURE: CPT

## 2024-05-27 PROCEDURE — 99285 EMERGENCY DEPT VISIT HI MDM: CPT

## 2024-05-27 RX ORDER — DOXYCYCLINE 100 MG/1
100 TABLET ORAL ONCE
Status: COMPLETED | OUTPATIENT
Start: 2024-05-27 | End: 2024-05-27

## 2024-05-27 RX ORDER — SODIUM CHLORIDE 9 MG/ML
INJECTION, SOLUTION INTRAVENOUS CONTINUOUS
Status: DISCONTINUED | OUTPATIENT
Start: 2024-05-27 | End: 2024-05-31

## 2024-05-27 RX ORDER — CEFTRIAXONE 2 G/1
2000 INJECTION, POWDER, FOR SOLUTION INTRAMUSCULAR; INTRAVENOUS ONCE
Status: COMPLETED | OUTPATIENT
Start: 2024-05-27 | End: 2024-05-27

## 2024-05-27 RX ORDER — MORPHINE SULFATE 4 MG/ML
4 INJECTION INTRAVENOUS ONCE
Status: COMPLETED | OUTPATIENT
Start: 2024-05-27 | End: 2024-05-27

## 2024-05-27 RX ORDER — METOCLOPRAMIDE 10 MG/1
10 TABLET ORAL 3 TIMES DAILY PRN
Status: ON HOLD | COMMUNITY

## 2024-05-27 RX ORDER — ONDANSETRON 2 MG/ML
4 INJECTION INTRAMUSCULAR; INTRAVENOUS ONCE
Status: COMPLETED | OUTPATIENT
Start: 2024-05-27 | End: 2024-05-27

## 2024-05-27 RX ORDER — SODIUM CHLORIDE 9 MG/ML
1000 INJECTION, SOLUTION INTRAVENOUS ONCE
Status: COMPLETED | OUTPATIENT
Start: 2024-05-27 | End: 2024-05-27

## 2024-05-27 RX ORDER — OXYCODONE HYDROCHLORIDE 5 MG/1
5 TABLET ORAL EVERY 6 HOURS PRN
Status: ON HOLD | COMMUNITY
Start: 2024-05-23

## 2024-05-27 RX ADMIN — ONDANSETRON 4 MG: 2 INJECTION INTRAMUSCULAR; INTRAVENOUS at 20:42

## 2024-05-27 RX ADMIN — DOXYCYCLINE 100 MG: 100 TABLET, FILM COATED ORAL at 22:00

## 2024-05-27 RX ADMIN — MORPHINE SULFATE 4 MG: 4 INJECTION INTRAVENOUS at 20:42

## 2024-05-27 RX ADMIN — SODIUM CHLORIDE 1000 ML: 9 INJECTION, SOLUTION INTRAVENOUS at 21:59

## 2024-05-27 RX ADMIN — IOHEXOL 100 ML: 350 INJECTION, SOLUTION INTRAVENOUS at 20:48

## 2024-05-27 RX ADMIN — CEFTRIAXONE SODIUM 2000 MG: 2 INJECTION, POWDER, FOR SOLUTION INTRAMUSCULAR; INTRAVENOUS at 21:59

## 2024-05-27 RX ADMIN — INSULIN HUMAN 10 UNITS: 100 INJECTION, SOLUTION PARENTERAL at 22:06

## 2024-05-27 RX ADMIN — SODIUM CHLORIDE: 9 INJECTION, SOLUTION INTRAVENOUS at 23:40

## 2024-05-27 ASSESSMENT — FIBROSIS 4 INDEX: FIB4 SCORE: 0.84

## 2024-05-27 ASSESSMENT — PAIN DESCRIPTION - PAIN TYPE
TYPE: CHRONIC PAIN
TYPE: ACUTE PAIN

## 2024-05-28 ENCOUNTER — APPOINTMENT (OUTPATIENT)
Dept: RADIOLOGY | Facility: MEDICAL CENTER | Age: 59
DRG: 459 | End: 2024-05-28
Attending: INTERNAL MEDICINE
Payer: COMMERCIAL
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
## 2024-05-28 LAB
EST. AVERAGE GLUCOSE BLD GHB EST-MCNC: 309 MG/DL
GLUCOSE BLD STRIP.AUTO-MCNC: 101 MG/DL (ref 65–99)
GLUCOSE BLD STRIP.AUTO-MCNC: 208 MG/DL (ref 65–99)
GLUCOSE BLD STRIP.AUTO-MCNC: 286 MG/DL (ref 65–99)
GLUCOSE BLD STRIP.AUTO-MCNC: 339 MG/DL (ref 65–99)
HBA1C MFR BLD: 12.4 % (ref 4–5.6)

## 2024-05-28 PROCEDURE — 700105 HCHG RX REV CODE 258: Performed by: HOSPITALIST

## 2024-05-28 PROCEDURE — 90791 PSYCH DIAGNOSTIC EVALUATION: CPT | Performed by: SOCIAL WORKER

## 2024-05-28 PROCEDURE — 36415 COLL VENOUS BLD VENIPUNCTURE: CPT

## 2024-05-28 PROCEDURE — 73620 X-RAY EXAM OF FOOT: CPT | Mod: LT

## 2024-05-28 PROCEDURE — 306637 HCHG MISC ORTHO ITEM RC 0274

## 2024-05-28 PROCEDURE — A9270 NON-COVERED ITEM OR SERVICE: HCPCS | Performed by: HOSPITALIST

## 2024-05-28 PROCEDURE — 700111 HCHG RX REV CODE 636 W/ 250 OVERRIDE (IP): Performed by: HOSPITALIST

## 2024-05-28 PROCEDURE — 97162 PT EVAL MOD COMPLEX 30 MIN: CPT

## 2024-05-28 PROCEDURE — L1951 AFO SPIRAL PREFABRICATED: HCPCS

## 2024-05-28 PROCEDURE — 700102 HCHG RX REV CODE 250 W/ 637 OVERRIDE(OP): Performed by: HOSPITALIST

## 2024-05-28 PROCEDURE — 99223 1ST HOSP IP/OBS HIGH 75: CPT | Performed by: HOSPITALIST

## 2024-05-28 PROCEDURE — 700101 HCHG RX REV CODE 250: Performed by: HOSPITALIST

## 2024-05-28 PROCEDURE — 82962 GLUCOSE BLOOD TEST: CPT | Mod: 91

## 2024-05-28 PROCEDURE — 770006 HCHG ROOM/CARE - MED/SURG/GYN SEMI*

## 2024-05-28 PROCEDURE — 97166 OT EVAL MOD COMPLEX 45 MIN: CPT

## 2024-05-28 RX ORDER — DOXYCYCLINE 100 MG/1
100 TABLET ORAL EVERY 12 HOURS
Status: DISPENSED | OUTPATIENT
Start: 2024-05-28 | End: 2024-06-04

## 2024-05-28 RX ORDER — DEXTROSE MONOHYDRATE 25 G/50ML
25 INJECTION, SOLUTION INTRAVENOUS
Status: ACTIVE | OUTPATIENT
Start: 2024-05-28

## 2024-05-28 RX ORDER — ASPIRIN 81 MG/1
81 TABLET ORAL EVERY EVENING
Status: DISPENSED | OUTPATIENT
Start: 2024-05-28

## 2024-05-28 RX ORDER — ONDANSETRON 2 MG/ML
4 INJECTION INTRAMUSCULAR; INTRAVENOUS EVERY 4 HOURS PRN
Status: ACTIVE | OUTPATIENT
Start: 2024-05-28

## 2024-05-28 RX ORDER — METOCLOPRAMIDE 10 MG/1
10 TABLET ORAL 3 TIMES DAILY PRN
Status: ACTIVE | OUTPATIENT
Start: 2024-05-28

## 2024-05-28 RX ORDER — MAGNESIUM SULFATE HEPTAHYDRATE 40 MG/ML
2 INJECTION, SOLUTION INTRAVENOUS ONCE
Status: COMPLETED | OUTPATIENT
Start: 2024-05-28 | End: 2024-05-28

## 2024-05-28 RX ORDER — PROMETHAZINE HYDROCHLORIDE 25 MG/1
12.5-25 TABLET ORAL EVERY 4 HOURS PRN
Status: ACTIVE | OUTPATIENT
Start: 2024-05-28

## 2024-05-28 RX ORDER — PROCHLORPERAZINE EDISYLATE 5 MG/ML
5-10 INJECTION INTRAMUSCULAR; INTRAVENOUS EVERY 4 HOURS PRN
Status: ACTIVE | OUTPATIENT
Start: 2024-05-28

## 2024-05-28 RX ORDER — PROMETHAZINE HYDROCHLORIDE 25 MG/1
12.5-25 SUPPOSITORY RECTAL EVERY 4 HOURS PRN
Status: ACTIVE | OUTPATIENT
Start: 2024-05-28

## 2024-05-28 RX ORDER — OMEPRAZOLE 20 MG/1
20 CAPSULE, DELAYED RELEASE ORAL 2 TIMES DAILY
Status: DISPENSED | OUTPATIENT
Start: 2024-05-28

## 2024-05-28 RX ORDER — ONDANSETRON 4 MG/1
4 TABLET, ORALLY DISINTEGRATING ORAL EVERY 4 HOURS PRN
Status: ACTIVE | OUTPATIENT
Start: 2024-05-28

## 2024-05-28 RX ORDER — OXYCODONE HYDROCHLORIDE 5 MG/1
5 TABLET ORAL EVERY 4 HOURS PRN
Status: DISPENSED | OUTPATIENT
Start: 2024-05-28

## 2024-05-28 RX ORDER — ACETAMINOPHEN 325 MG/1
650 TABLET ORAL EVERY 6 HOURS PRN
Status: ACTIVE | OUTPATIENT
Start: 2024-05-28

## 2024-05-28 RX ADMIN — INSULIN HUMAN 3 UNITS: 100 INJECTION, SOLUTION PARENTERAL at 17:50

## 2024-05-28 RX ADMIN — INSULIN HUMAN 5 UNITS: 100 INJECTION, SOLUTION PARENTERAL at 15:26

## 2024-05-28 RX ADMIN — SODIUM CHLORIDE: 9 INJECTION, SOLUTION INTRAVENOUS at 00:47

## 2024-05-28 RX ADMIN — DOXYCYCLINE 100 MG: 100 TABLET, FILM COATED ORAL at 05:07

## 2024-05-28 RX ADMIN — OMEPRAZOLE 20 MG: 20 CAPSULE, DELAYED RELEASE ORAL at 05:07

## 2024-05-28 RX ADMIN — DOXYCYCLINE 100 MG: 100 TABLET, FILM COATED ORAL at 17:40

## 2024-05-28 RX ADMIN — OXYCODONE HYDROCHLORIDE 5 MG: 5 TABLET ORAL at 11:03

## 2024-05-28 RX ADMIN — OMEPRAZOLE 20 MG: 20 CAPSULE, DELAYED RELEASE ORAL at 17:40

## 2024-05-28 RX ADMIN — ASPIRIN 81 MG: 81 TABLET, COATED ORAL at 17:40

## 2024-05-28 RX ADMIN — CEFTRIAXONE SODIUM 2000 MG: 10 INJECTION, POWDER, FOR SOLUTION INTRAVENOUS at 17:41

## 2024-05-28 RX ADMIN — INSULIN HUMAN 6 UNITS: 100 INJECTION, SOLUTION PARENTERAL at 08:35

## 2024-05-28 RX ADMIN — SODIUM CHLORIDE: 9 INJECTION, SOLUTION INTRAVENOUS at 21:57

## 2024-05-28 RX ADMIN — OXYCODONE HYDROCHLORIDE 5 MG: 5 TABLET ORAL at 23:04

## 2024-05-28 RX ADMIN — OXYCODONE HYDROCHLORIDE 5 MG: 5 TABLET ORAL at 05:07

## 2024-05-28 RX ADMIN — MAGNESIUM SULFATE HEPTAHYDRATE 2 G: 2 INJECTION, SOLUTION INTRAVENOUS at 01:16

## 2024-05-28 RX ADMIN — INSULIN GLARGINE-YFGN 20 UNITS: 100 INJECTION, SOLUTION SUBCUTANEOUS at 08:36

## 2024-05-28 SDOH — ECONOMIC STABILITY: TRANSPORTATION INSECURITY
IN THE PAST 12 MONTHS, HAS THE LACK OF TRANSPORTATION KEPT YOU FROM MEDICAL APPOINTMENTS OR FROM GETTING MEDICATIONS?: YES

## 2024-05-28 SDOH — ECONOMIC STABILITY: TRANSPORTATION INSECURITY
IN THE PAST 12 MONTHS, HAS LACK OF RELIABLE TRANSPORTATION KEPT YOU FROM MEDICAL APPOINTMENTS, MEETINGS, WORK OR FROM GETTING THINGS NEEDED FOR DAILY LIVING?: YES

## 2024-05-28 ASSESSMENT — ACTIVITIES OF DAILY LIVING (ADL): TOILETING: INDEPENDENT

## 2024-05-28 ASSESSMENT — PAIN DESCRIPTION - PAIN TYPE
TYPE: CHRONIC PAIN
TYPE: ACUTE PAIN
TYPE: ACUTE PAIN
TYPE: CHRONIC PAIN
TYPE: ACUTE PAIN

## 2024-05-28 ASSESSMENT — LIFESTYLE VARIABLES
EVER FELT BAD OR GUILTY ABOUT YOUR DRINKING: NO
HAVE YOU EVER FELT YOU SHOULD CUT DOWN ON YOUR DRINKING: NO
TOTAL SCORE: 0
EVER HAD A DRINK FIRST THING IN THE MORNING TO STEADY YOUR NERVES TO GET RID OF A HANGOVER: NO
AVERAGE NUMBER OF DAYS PER WEEK YOU HAVE A DRINK CONTAINING ALCOHOL: 0
ALCOHOL_USE: NO
TOTAL SCORE: 0
HOW MANY TIMES IN THE PAST YEAR HAVE YOU HAD 5 OR MORE DRINKS IN A DAY: 0
TOTAL SCORE: 0
ON A TYPICAL DAY WHEN YOU DRINK ALCOHOL HOW MANY DRINKS DO YOU HAVE: 0
SUBSTANCE_ABUSE: 0
DOES PATIENT WANT TO STOP DRINKING: NO
CONSUMPTION TOTAL: NEGATIVE
HAVE PEOPLE ANNOYED YOU BY CRITICIZING YOUR DRINKING: NO

## 2024-05-28 ASSESSMENT — COGNITIVE AND FUNCTIONAL STATUS - GENERAL
CLIMB 3 TO 5 STEPS WITH RAILING: A LOT
SUGGESTED CMS G CODE MODIFIER DAILY ACTIVITY: CK
STANDING UP FROM CHAIR USING ARMS: A LOT
WALKING IN HOSPITAL ROOM: A LOT
TOILETING: A LOT
DRESSING REGULAR LOWER BODY CLOTHING: A LITTLE
SUGGESTED CMS G CODE MODIFIER MOBILITY: CK
SUGGESTED CMS G CODE MODIFIER DAILY ACTIVITY: CK
EATING MEALS: A LITTLE
STANDING UP FROM CHAIR USING ARMS: A LITTLE
SUGGESTED CMS G CODE MODIFIER MOBILITY: CK
TOILETING: A LITTLE
MOVING FROM LYING ON BACK TO SITTING ON SIDE OF FLAT BED: A LITTLE
MOBILITY SCORE: 18
MOVING TO AND FROM BED TO CHAIR: A LITTLE
DRESSING REGULAR UPPER BODY CLOTHING: A LITTLE
WALKING IN HOSPITAL ROOM: A LOT
HELP NEEDED FOR BATHING: A LITTLE
DRESSING REGULAR LOWER BODY CLOTHING: A LITTLE
MOBILITY SCORE: 15
DRESSING REGULAR UPPER BODY CLOTHING: A LITTLE
EATING MEALS: A LITTLE
TURNING FROM BACK TO SIDE WHILE IN FLAT BAD: A LITTLE
PERSONAL GROOMING: A LITTLE
HELP NEEDED FOR BATHING: A LITTLE
DAILY ACTIVITIY SCORE: 17
DAILY ACTIVITIY SCORE: 18
PERSONAL GROOMING: A LITTLE
MOVING TO AND FROM BED TO CHAIR: A LITTLE
CLIMB 3 TO 5 STEPS WITH RAILING: A LOT

## 2024-05-28 ASSESSMENT — PATIENT HEALTH QUESTIONNAIRE - PHQ9
3. TROUBLE FALLING OR STAYING ASLEEP OR SLEEPING TOO MUCH: MORE THAN HALF THE DAYS
6. FEELING BAD ABOUT YOURSELF - OR THAT YOU ARE A FAILURE OR HAVE LET YOURSELF OR YOUR FAMILY DOWN: MORE THAN HALF THE DAYS
9. THOUGHTS THAT YOU WOULD BE BETTER OFF DEAD, OR OF HURTING YOURSELF: SEVERAL DAYS
4. FEELING TIRED OR HAVING LITTLE ENERGY: MORE THAN HALF THE DAYS
5. POOR APPETITE OR OVEREATING: MORE THAN HALF THE DAYS
8. MOVING OR SPEAKING SO SLOWLY THAT OTHER PEOPLE COULD HAVE NOTICED. OR THE OPPOSITE, BEING SO FIGETY OR RESTLESS THAT YOU HAVE BEEN MOVING AROUND A LOT MORE THAN USUAL: NOT AT ALL
SUM OF ALL RESPONSES TO PHQ9 QUESTIONS 1 AND 2: 4
2. FEELING DOWN, DEPRESSED, IRRITABLE, OR HOPELESS: MORE THAN HALF THE DAYS
1. LITTLE INTEREST OR PLEASURE IN DOING THINGS: MORE THAN HALF THE DAYS
7. TROUBLE CONCENTRATING ON THINGS, SUCH AS READING THE NEWSPAPER OR WATCHING TELEVISION: MORE THAN HALF THE DAYS
SUM OF ALL RESPONSES TO PHQ QUESTIONS 1-9: 15

## 2024-05-28 ASSESSMENT — SOCIAL DETERMINANTS OF HEALTH (SDOH)
WITHIN THE LAST YEAR, HAVE YOU BEEN AFRAID OF YOUR PARTNER OR EX-PARTNER?: NO
WITHIN THE PAST 12 MONTHS, YOU WORRIED THAT YOUR FOOD WOULD RUN OUT BEFORE YOU GOT THE MONEY TO BUY MORE: SOMETIMES TRUE
IN THE PAST 12 MONTHS, HAS THE ELECTRIC, GAS, OIL, OR WATER COMPANY THREATENED TO SHUT OFF SERVICE IN YOUR HOME?: NO
WITHIN THE LAST YEAR, HAVE YOU BEEN HUMILIATED OR EMOTIONALLY ABUSED IN OTHER WAYS BY YOUR PARTNER OR EX-PARTNER?: NO
WITHIN THE PAST 12 MONTHS, THE FOOD YOU BOUGHT JUST DIDN'T LAST AND YOU DIDN'T HAVE MONEY TO GET MORE: SOMETIMES TRUE
WITHIN THE LAST YEAR, HAVE YOU BEEN KICKED, HIT, SLAPPED, OR OTHERWISE PHYSICALLY HURT BY YOUR PARTNER OR EX-PARTNER?: NO
WITHIN THE LAST YEAR, HAVE TO BEEN RAPED OR FORCED TO HAVE ANY KIND OF SEXUAL ACTIVITY BY YOUR PARTNER OR EX-PARTNER?: NO

## 2024-05-28 ASSESSMENT — ENCOUNTER SYMPTOMS
COUGH: 0
HALLUCINATIONS: 0
CHILLS: 1
VOMITING: 0
SHORTNESS OF BREATH: 1
POLYDIPSIA: 0
FOCAL WEAKNESS: 0
MYALGIAS: 0
BRUISES/BLEEDS EASILY: 0
SHORTNESS OF BREATH: 0
TREMORS: 0
ORTHOPNEA: 0
STRIDOR: 0
SORE THROAT: 0
DIZZINESS: 0
DEPRESSION: 0
ABDOMINAL PAIN: 0
DIARRHEA: 0
TINGLING: 0
NERVOUS/ANXIOUS: 0
FLANK PAIN: 0
DOUBLE VISION: 0
COUGH: 1
SPUTUM PRODUCTION: 0
SINUS PAIN: 0
FEVER: 0
HEADACHES: 0
INSOMNIA: 0
DIAPHORESIS: 0
HEMOPTYSIS: 0
PND: 0
CONSTIPATION: 0
EYE REDNESS: 0
LOSS OF CONSCIOUSNESS: 0
WEAKNESS: 0
BLOOD IN STOOL: 0
NAUSEA: 1
CLAUDICATION: 0
NECK PAIN: 0
CHILLS: 0
PALPITATIONS: 0
FALLS: 0
SEIZURES: 0
BACK PAIN: 0
DEPRESSION: 1
MYALGIAS: 1
PHOTOPHOBIA: 0
HEARTBURN: 0
BACK PAIN: 1
EYE PAIN: 0
FEVER: 1
NAUSEA: 0
BLURRED VISION: 0
WHEEZING: 0

## 2024-05-28 ASSESSMENT — GAIT ASSESSMENTS: GAIT LEVEL OF ASSIST: REFUSED

## 2024-05-28 ASSESSMENT — FIBROSIS 4 INDEX: FIB4 SCORE: 1.02

## 2024-05-28 NOTE — DIETARY
Nutrition services: Day 1 of admit.  Christoph Taylor is a 58 y.o. male with admitting DX of  Pneumonia due to infectious organism   Consult received for malnutrition screening tool score of 2 for 2-13lb wt loss x 3 months and poor PO. RD also consulted for DM diet ed.     Visited pt at bedside. Pt said that he would like to get more food as he is still hungry. Pt expressed that he would also like to receive supplements BID and double protein/veg portions. RD obtained pt food preferences. Pt said that he has a good appetite but that he has lost 10lbs x 1-2 months d/t lack of food. Pt said that his UBW is 185-190lbs and that he has been eating 30% than normal for a while, even prior to the wt loss.     Pt consented to nutrition focused physical exam (NFPE). RD palpated temporalis muscle, buccal fat pads, acromion process/clavicle area, and upper arm area. Pt had mild depression in muscle surrounding clavicle area and moderate fat loss in upper arm area.     Pt declined DM diet education at this time, but took handout out. RD able to answer all questions to patient's satisfaction.     No other education needs identified at this time. Consider referral to outpatient nutrition services for continuation of education as indicated or per pt preferences.     Assessment:  Height: 182.9 cm (6')  Weight: 74.8 kg (165 lb) via stated weight.   Body mass index is 22.38 kg/m²., BMI classification: WNL  Diet/Intake: Consistent CHO; Per ADLs, pt ate % of breakfast this am. Pt was also recently here about a week ago and was eating % of meals.     Evaluation:   Labs: No labs drawn today. 5/27: A1c = 12.4  Meds: Lantus, SSI, D50, Reglan, omeprazole, zofran PRN, oxycodone PRN, compazine PRN, phenergan PRN, magnesium sulfate (stopped)  Skin: No documented wounds, no edema  +BM: PTA  Per chart review, pt has lost 3.95% wt x 2.5 weeks, 5% wt x 3 months, and 7% x 11 months.   Wt Readings from Last Encounters:   05/28/24 77.5  kg (170 lb 13.7 oz)   05/21/24 74 kg (163 lb 2.3 oz)   05/11/24 80.7 kg (177 lb 14.6 oz)   02/25/24 81.2 kg (179 lb 0.2 oz)   01/28/24 71.7 kg (158 lb 1.1 oz)   08/31/23 77.3 kg (170 lb 6.4 oz)   08/06/23 80.5 kg (177 lb 7.5 oz)   06/16/23 83.2 kg (183 lb 6.8 oz)     Malnutrition Risk: Pt meets moderate malnutrition in the context of environmental circumstances r/t pt report of lack of food aeb moderate fat loss and mild muscle wasting noted in NFPE.    Recommendations/Plan:  Order Boost Plus BID.  Add snacks in between meals.  Change meal to have high protein and more vegetables.    Encourage intake of >50% of meals  Document intake of all meals as % taken in ADL's to provide interdisciplinary communication across all shifts.   Monitor weight.     Expect pt to continue eating well.  RD available PRN per department policy.

## 2024-05-28 NOTE — ED PROVIDER NOTES
ED Provider Note    CHIEF COMPLAINT  Chief Complaint   Patient presents with    Back Pain    Suicidal Ideation     EXTERNAL RECORDS REVIEWED  [place] from 5/21 the patient was admitted for worsening abdominal pain and back pain.  Abdominal pain is notably chronic, has had gastroparesis secondary to poor control of his diabetes.  During that time he was hypoxic to 88%, negative chest x-ray, most recent A1c was 12.2, home health care recommended prior to discharge.    Prior ER visit from 5/6 for abdominal pain and elevated blood sugars.  History of insulin-dependent diabetes, CKD, has been seen for abdominal pain at [facility] in 2023, most recent workup here showed an elevated beta hydroxybutyrate, anion gap of 23 during that time the patient was given fluids, had anion gap without acidosis, treated with fluids and insulin and had alleviation of his symptoms.    HPI/ROS  LIMITATION TO HISTORY   Select: : None  OUTSIDE HISTORIAN(S):  none    Christoph Taylor is a 58 y.o. male who presents to the emergency room for evaluation of injury sustained from a ground-level fall.  Patient is diabetic, he has CKD.  He felt like he was sugars might be off so he was making himself a meal prior to giving self his normal amounts of 20 units of long-acting insulin.  While sitting up from his wheelchair which he normally uses because of chronic weakness from prior old compression fracture he was feeling off balance and felt on his left side.  He went down and struck both counter on the ground with his chest and parts of his flank.  He had no lightheadedness, said initially he had some left arm pain though this is resolved.  He continues to have pain and discomfort throughout the left side of his body that is worse with deep inspiration and feels that he is worse in his mid back.  He endorses chronic diarrhea that is unchanged with no bloody bowel movements, no recent urinary symptoms, no productive cough and denies any fevers or  "chills.  He reports he has been taking his medications and he has not missed any doses    Nursing Note had said that he was feeling somewhat depressed.  He states that with his chronic conditions he always feels like \"my life sucks\" and that he is \"trying to get better but it is very slow.\"  He did not feel like it safe for him to own a firearm so he has gotten rid of the send does not currently have any active suicidal ideations nor does he have any homicidal ideations.  He is interested in talking to somebody about resources but does not feel like he is a danger to himself at this time.    PAST MEDICAL HISTORY   has a past medical history of Abnormal electrocardiogram (ECG) (EKG) (11/8/2021), KRISTAL (acute kidney injury) ([facility]) (11/8/2021), Chest pain in adult (11/8/2021), CKD (chronic kidney disease) stage 3, GFR 30-59 ml/min (11/7/2021), Diabetes ([facility]), Diabetic ketoacidosis without coma associated with type 2 diabetes mellitus ([facility]) (11/7/2021), Diarrhea (3/18/2022), DKA, type 1, not at goal ([facility]) (7/28/2022), Esophagitis (7/28/2022), [place] (hard of hearing), and Hypercholesteremia.    SURGICAL HISTORY   has a past surgical history that includes other; upper gi endoscopy,diagnosis (N/A, 3/14/2023); and finger or hand incision and drainage (Right, 6/12/2023).    FAMILY HISTORY  Family History   Problem Relation Age of Onset    Heart Disease Father        SOCIAL HISTORY  Social History     Tobacco Use    Smoking status: Former    Smokeless tobacco: Never   Vaping Use    Vaping status: Not on file   Substance and Sexual Activity    Alcohol use: Not Currently    Drug use: Not Currently    Sexual activity: Not on file       CURRENT MEDICATIONS  Home Medications       Reviewed by Lamar Hill (Pharmacy Tech) on 05/27/24 at 2230  Med List Status: Complete     Medication Last Dose Status   aspirin 81 MG EC tablet 5/26/2024 Active   insulin glargine (LANTUS SOLOSTAR) 100 UNIT/ML Solution Pen-injector injection 5/26/2024 " Active   metoclopramide (REGLAN) 10 MG Tab 5/26/2024 Active   multivitamin Tab FEW DAYS AGO Active   oxyCODONE immediate-release (ROXICODONE) 5 MG Tab FEW DAYS AGO Active   pantoprazole (PROTONIX) 40 MG Tablet Delayed Response 5/26/2024 Active                  Audit from Redirected Encounters    **Home medications have not yet been reviewed for this encounter**         ALLERGIES  No Known Allergies    PHYSICAL EXAM  VITAL SIGNS: /78   Pulse 69   Temp 36.7 °C (98.1 °F) (Temporal)   Resp 16   Ht 1.829 m (6')   Wt 74.8 kg (165 lb)   SpO2 95%   BMI 22.38 kg/m²    Genl: M sitting in gurney uncomfortably, speaking clearly, appears in mild distress   Head: NC/AT   ENT: Mucous membranes moist, posterior pharynx clear, uvula midline, nares patent bilaterally   Eyes: Normal sclera, pupils equal round reactive to light  Neck: Supple, FROM, no midline tenderness, no pain with manipulation.  Pulmonary: Lungs are clear to auscultation bilaterally  Chest: Tenderness with palpation over the left lateral chest wall, no crepitus, no bony step-offs deformities or bruising.  CV:  RRR, no murmur appreciated  Abdomen: soft, multiple areas of scattered tenderness along the left flank, left upper quadrant seem to be somewhat inconsistent.  There is no abdominal rigidity, no gross distention, no fluid wave, no flank ecchymosis, no pain with bilateral straight leg raise today he has easily reproducible tenderness with palpation of the left hip.  No masses palpated, no HSM   Back: Nonspecific and inconsistent mid thoracic back discomfort from T4-T12.  No step-offs or deformities.  No right-sided para thoracic soft tissue abnormalities or tenderness.  Left side of the posterior chest wall and abdominal flank is tender to touch and somewhat inconsistent on reexamination.  No ecchymoses.  Musculoskeletal: Pain free ROM of the neck. Moving upper and lower extremities in spontaneous and coordinated fashion  Neuro: A&Ox4 (person,  place, time, situation), speech fluent, gait not assessed, no focal deficits appreciated, No cerebellar signs. Sensation is grossly intact in the distal upper and lower extremities.  5/5 strength in  and dorsiflexion/plantar flexion of the ankles  Psych: Patient has an appropriate affect and behavior  Skin: No rash or lesions.  No pallor or jaundice.  No cyanosis.  Warm and dry.     EKG/LABS  Results for orders placed or performed during the hospital encounter of 24   EKG   Result Value Ref Range    Report       [facility] Emergency Dept.    Test Date:  2024  Pt Name:    BAILEE JACOBSON               Department: ER  MRN:        [id number]                      Room:        21  Gender:     Male                         Technician: [id number]  :        1965                   Requested By:ER TRIAGE PROTOCOL  Order #:    [id number]                    Reading MD: Jaxson Gracia MD    Measurements  Intervals                                Axis  Rate:       66                           P:          39  NV:         172                          QRS:        44  QRSD:       91                           T:          52  QT:         399  QTc:        418    Interpretive Statements  Sinus rhythm, rate of 66, normal intervals and axis, no acute ST elevations  or ischemic changes.  Improved from prior dated 24  Electronically Signed On 2024 20:24:33 PDT by Jaxson Gracia MD     I have independently interpreted this EKG    Labs Reviewed   CBC WITH DIFFERENTIAL - Abnormal; Notable for the following components:       Result Value    RBC 3.83 (*)     Hemoglobin 10.6 (*)     Hematocrit 32.1 (*)     Lymphocytes 19.10 (*)     Lymphs (Absolute) 0.96 (*)     All other components within normal limits   BASIC METABOLIC PANEL - Abnormal; Notable for the following components:    Sodium 132 (*)     Chloride 94 (*)     Glucose 582 (*)     Bun 28 (*)     Calcium 8.3 (*)     All other components within normal  limits   VENOUS BLOOD GAS - Abnormal; Notable for the following components:    Venous Bg Pco2 39.0 (*)     Venous Bg Pco2 Temp Corrected 38.5 (*)     Venous Bg Po2 70.1 (*)     Venous Bg Po2 Temp Corrected 68.7 (*)     All other components within normal limits   URINALYSIS - Abnormal; Notable for the following components:    Glucose >=1000 (*)     Occult Blood Trace (*)     All other components within normal limits   URINE MICROSCOPIC (W/UA) - Abnormal; Notable for the following components:    WBC 0-2 (*)     RBC 0-2 (*)     Bacteria Moderate (*)     All other components within normal limits   POCT GLUCOSE DEVICE RESULTS - Abnormal; Notable for the following components:    POC Glucose, Blood 501 (*)     All other components within normal limits   POC BREATHALIZER - Normal   DIAGNOSTIC ALCOHOL   MAGNESIUM   ESTIMATED GFR   URINE DRUG SCREEN   POCT GLUCOSE     RADIOLOGY/PROCEDURES   I have independently interpreted the diagnostic imaging associated with this visit and am waiting the final reading from the radiologist.   My preliminary interpretation is as follows: No acute traumatic injury of the  Radiologist interpretation:  CT-LSPINE W/O PLUS RECONS   Final Result         1.  No acute traumatic bony injury of the lumbar spine.      CT-TSPINE W/O PLUS RECONS   Final Result         1.  No acute traumatic bony injury of the thoracic spine.      CT-CHEST,ABDOMEN,PELVIS WITH   Final Result        COURSE & MEDICAL DECISION MAKING    ASSESSMENT, COURSE AND PLAN    Care Narrative: Patient presents emergency room for symptoms as described above.  Review of the patient's chart shows that the patient has had multiple recent admissions including evaluations for worsening control of his diabetes, recent worsening chronic back pain and abdominal discomfort that was attributed to gastroparesis and substance in the improved with treatment of diabetes and fluid resuscitation.  Today he presents with a ground-level fall and has  multiple areas of scattered tenderness both in the left flank and in the back throughout the thoracic and part of the lumbar spine.  He has new and worsening hypoxia down to 81% on room air.  Quickly corrected on 2 L.    Primary concern after my initial assessment is the patient's POC glucose is over 500, he consistently has poor control of his glucose and has probably had medication nonadherence.  Labs are ordered for assessment of evaluation of his electrolytes, EKG, metabolic derangement secondary to possibly DKA is ordered.  Trauma scans of the chest abdomen pelvis and recons of the thoracic and lumbar spine are also ordered as the patient has a multiple scattered areas of tenderness with distracting chronic abdominal pain that make it truly difficult to discern where he is ultimately pain and discomfort is being caused from.    Following single administration of pain medications he is feeling improved and able to move spontaneously.  He has hyperglycemia with no anion gap or bicarb changes, no acidosis on VBG.  He is not in DKA and does not have significant kidney injury at this time.  He has no evidence of acute infectious etiology or stone pathology on urinalysis and alcohol level is undetectable.  CT scans do not show traumatic injury though there is some atypical findings in the bilateral lungs that could be associated with his recent admission with his poorly controlled diabetes he is at risk for healthcare associated pneumonia.  He is not septic, I have spoken with pharmacy and based on recent admission he would require Rocephin and doxycycline.  Attempts to wean him off of oxygen have been unsuccessful where he dips down to 81%.  He is on 2 L with quick correction and has no pleuritic chest discomfort or other findings that would explain this beyond his infectious etiology.    He requires admission for his hypoxia and bilateral pneumonia.  I spoke with the hospitalist will admit the patient guarded  condition.    FINAL DIAGNOSIS  1. Fall in home, initial encounter    2. Pneumonia of both lungs due to infectious organism, unspecified part of lung    3. Depression, unspecified depression type    4. Hypoxia    5. Hyperglycemia      Electronically signed by: Basil Puri M.D., 5/27/2024 8:20 PM

## 2024-05-28 NOTE — CONSULTS
Alert Team:    Spoke with ERP and informed patient is not actively suicidal, no plan or intent, no legal hold applicable. Patient to be admitted to hospital; ED Consult deferred to IP Consult to Behavioral Health for psychotherapy while admitted. Routine observation level while hospitalized.

## 2024-05-28 NOTE — H&P
Hospital Medicine History & Physical Note    Date of Service  5/28/2024    Primary Care Physician  YESY Tariq.    Consultants      Specialist Names:     Code Status  Full Code    Chief Complaint  Chief Complaint   Patient presents with    Back Pain    Suicidal Ideation       History of Presenting Illness  Christoph Taylor is a 58 y.o. male who presented 5/27/2024 with past medical history of poorly controlled diabetes, history of noncompliance, esophagitis who presents to the hospital for a fall.  Patient states that he tripped and fell today.  He states that he was feeling weakness in his legs.  He landed on his left hip and back.  Patient complains of back pain, neck pain, hip pain and shoulder pain.  Over the past 3 to 4 days he has been having shortness of breath, productive cough and fevers.  The patient states that he has not taken his insulin dose for the past 1 day.    CT scan of the chest abdomen pelvis found patchy bilateral pulmonary infiltrates.  Bilateral nephrolithiasis.  Distal esophageal wall thickening.    CT T-spine and L-spine were negative for fractures.    EKG interpreted by me found normal sinus rhythm without ST segment changes    I discussed the plan of care with patient.    Review of Systems  Review of Systems   Constitutional:  Positive for chills, fever and malaise/fatigue. Negative for diaphoresis.   HENT:  Negative for congestion, ear discharge, ear pain, hearing loss, nosebleeds, sinus pain, sore throat and tinnitus.    Eyes:  Negative for blurred vision, double vision, photophobia and pain.   Respiratory:  Positive for cough and shortness of breath. Negative for hemoptysis, sputum production, wheezing and stridor.    Cardiovascular:  Negative for chest pain, palpitations, orthopnea, claudication, leg swelling and PND.   Gastrointestinal:  Positive for nausea. Negative for abdominal pain, blood in stool, constipation, diarrhea, heartburn, melena and vomiting.    Genitourinary:  Negative for dysuria, flank pain, frequency, hematuria and urgency.   Musculoskeletal:  Negative for back pain, falls, joint pain, myalgias and neck pain.   Skin:  Negative for itching and rash.   Neurological:  Negative for dizziness, tingling, tremors, weakness and headaches.   Endo/Heme/Allergies:  Negative for environmental allergies and polydipsia. Does not bruise/bleed easily.   Psychiatric/Behavioral:  Negative for depression, hallucinations, substance abuse and suicidal ideas.        Past Medical History   has a past medical history of Abnormal electrocardiogram (ECG) (EKG) (11/8/2021), KRISTAL (acute kidney injury) ([facility]) (11/8/2021), Chest pain in adult (11/8/2021), CKD (chronic kidney disease) stage 3, GFR 30-59 ml/min (11/7/2021), Diabetes ([facility]), Diabetic ketoacidosis without coma associated with type 2 diabetes mellitus ([facility]) (11/7/2021), Diarrhea (3/18/2022), DKA, type 1, not at goal ([facility]) (7/28/2022), Esophagitis (7/28/2022), [place] (hard of hearing), and Hypercholesteremia.    Surgical History   has a past surgical history that includes other; pr upper gi endoscopy,diagnosis (N/A, 3/14/2023); and finger or hand incision and drainage (Right, 6/12/2023).     Family History  family history includes Heart Disease in his father.   Family history reviewed with patient. There is no family history that is pertinent to the chief complaint.     Social History   reports that he has quit smoking. He has never used smokeless tobacco. He reports that he does not currently use alcohol. He reports that he does not currently use drugs.    Allergies  No Known Allergies    Medications  Prior to Admission Medications   Prescriptions Last Dose Informant Patient Reported? Taking?   aspirin 81 MG EC tablet 5/26/2024 at PM Patient Yes Yes   Sig: Take 81 mg by mouth every evening.   insulin glargine (LANTUS SOLOSTAR) 100 UNIT/ML Solution Pen-injector injection 5/26/2024 at PM Patient No Yes   Sig: Inject 20 Units  under the skin 2 times a day.   metoclopramide (REGLAN) 10 MG Tab 5/26/2024 at PM Patient Yes Yes   Sig: Take 10 mg by mouth 3 times a day as needed. Indications: Indigestion   multivitamin Tab FEW DAYS AGO at UNK Patient Yes No   Sig: Take 1 Tablet by mouth every evening.   oxyCODONE immediate-release (ROXICODONE) 5 MG Tab FEW DAYS AGO at OUT Patient Yes Yes   Sig: Take 5 mg by mouth every 6 hours as needed for Severe Pain. 3 day supply   pantoprazole (PROTONIX) 40 MG Tablet Delayed Response 5/26/2024 at PM Patient No Yes   Sig: Take 1 Tablet by mouth 2 times a day for 30 days.      Facility-Administered Medications: None       Physical Exam  Temp:  [36.7 °C (98.1 °F)] 36.7 °C (98.1 °F)  Pulse:  [60-75] 66  Resp:  [11-18] 16  BP: (101-130)/(58-78) 101/58  SpO2:  [90 %-95 %] 95 %  Blood Pressure: 101/58   Temperature: 36.7 °C (98.1 °F)   Pulse: 66   Respiration: 16   Pulse Oximetry: 95 %       Physical Exam  Vitals and nursing note reviewed.   Constitutional:       General: He is not in acute distress.     Appearance: Normal appearance. He is not ill-appearing, toxic-appearing or diaphoretic.   HENT:      Head: Normocephalic and atraumatic.      Nose: No congestion or rhinorrhea.      Mouth/Throat:      Pharynx: No posterior oropharyngeal erythema.   Eyes:      General: No scleral icterus.        Right eye: No discharge.   Cardiovascular:      Rate and Rhythm: Normal rate and regular rhythm.      Pulses: Normal pulses.      Heart sounds: Normal heart sounds. No murmur heard.     No friction rub. No gallop.   Pulmonary:      Effort: Pulmonary effort is normal. No respiratory distress.      Breath sounds: No stridor. Rhonchi present. No wheezing or rales.   Abdominal:      General: There is no distension.      Tenderness: There is no abdominal tenderness.   Musculoskeletal:         General: No swelling, tenderness, deformity or signs of injury.      Cervical back: Normal range of motion.      Right lower leg: No  "edema.      Left lower leg: No edema.   Skin:     Capillary Refill: Capillary refill takes more than 3 seconds.      Coloration: Skin is not jaundiced or pale.      Findings: No bruising, erythema, lesion or rash.   Neurological:      General: No focal deficit present.      Mental Status: He is alert and oriented to person, place, and time.      Comments: Straight leg test negative         Laboratory:  Recent Labs     05/27/24 1950   WBC 5.0   RBC 3.83*   HEMOGLOBIN 10.6*   HEMATOCRIT 32.1*   MCV 83.8   MCH 27.7   MCHC 33.0   RDW 39.8   PLATELETCT 190   MPV 9.3     Recent Labs     05/27/24 1950   SODIUM 132*   POTASSIUM 4.7   CHLORIDE 94*   CO2 25   GLUCOSE 582*   BUN 28*   CREATININE 0.74   CALCIUM 8.3*     Recent Labs     05/27/24 1950   GLUCOSE 582*         No results for input(s): \"NTPROBNP\" in the last 72 hours.      No results for input(s): \"TROPONINT\" in the last 72 hours.    Imaging:  CT-LSPINE W/O PLUS RECONS   Final Result         1.  No acute traumatic bony injury of the lumbar spine.      CT-TSPINE W/O PLUS RECONS   Final Result         1.  No acute traumatic bony injury of the thoracic spine.      CT-CHEST,ABDOMEN,PELVIS WITH   Final Result          X-Ray:  I have personally reviewed the images and compared with prior images.  EKG:  I have personally reviewed the images and compared with prior images.    Assessment/Plan:  Justification for Admission Status  I anticipate this patient will require at least two midnights for appropriate medical management, necessitating inpatient admission because pneumonia    Patient will need a Med/Surg bed on MEDICAL service .  The need is secondary to pneumonia.    * Pneumonia due to infectious organism- (present on admission)  Assessment & Plan  Bilateral infiltrates on CT scan  Follow-up blood cultures and sputum cultures  Start ceftriaxone and doxycycline    Fall  Assessment & Plan  Patient did not lose consciousness or hit his head  Complains of neck pain, back " pain and hip pain  PT OT eval    Acute respiratory failure with hypoxia (HCC)- (present on admission)  Assessment & Plan  On 3 L of O2 above baseline    Thoracic back pain- (present on admission)  Assessment & Plan  Acute on chronic back pain  CT scans are negative  Straight leg test negative    Diabetes mellitus type 2, insulin dependent, poorly controlled with marked hyperglycemia (HCC)- (present on admission)  Assessment & Plan  Start on insulin sliding scale with serial Accu-Checks  Check hemoglobin A1c  Hypoglycemic protocol in place  Lantus 20 twice daily    Acute esophagitis- (present on admission)  Assessment & Plan  Continue Protonix        VTE prophylaxis: SCDs/TEDs

## 2024-05-28 NOTE — PROGRESS NOTES
Assumed care of Pt from NOC RN. Pt is asleep at bedside report. Call light is within reach and all needs are met. Bed is locked and in low position with alarm on.

## 2024-05-28 NOTE — PROGRESS NOTES
4 Eyes Skin Assessment Completed by duane RN and maria alejandra RN.    Head WDL  Ears WDL  Nose WDL  Mouth WDL  Neck WDL  Breast/Chest WDL  Shoulder Blades WDL  Spine WDL  (R) Arm/Elbow/Hand Redness and Blanching  (L) Arm/Elbow/Hand Redness and Blanching  Abdomen WDL  Groin WDL  Scrotum/Coccyx/Buttocks Redness and Blanching  (R) Leg Redness and Scab  (L) Leg Redness and Scab  (R) Heel/Foot/Toe Redness, calloused  (L) Heel/Foot/Toe Redness, calloused          Devices In Places Blood Pressure Cuff      Interventions In Place Gray Ear Foams and Pillows    Possible Skin Injury No    Pictures Uploaded Into Epic Yes  Wound Consult Placed N/A  RN Wound Prevention Protocol Ordered No

## 2024-05-28 NOTE — ASSESSMENT & PLAN NOTE
Start on insulin sliding scale with serial Accu-Checks  Check hemoglobin A1c  Hypoglycemic protocol in place  Increased Lantus, uncontrolled   Improved

## 2024-05-28 NOTE — DISCHARGE PLANNING
[facility] Transitional Care Coordination    Referral from:  Dr. Acuna  Insurance Provider on Facesheet:  [employer]  Potential Rehab diagnosis:  Debility    Chart review indicates patient has ongoing medical management and therapy needs    D/C Support:  Chart notes reflect pt lives alone without support    No Physiatry consult per protocol.  Current documentation does not reflect discharge support to facilitate safe return to community.  Lack of return to community support is barrier to acute rehab.  Please reach out if PMR consult requested for medical management.     Last Covid test:    Thank you for the referral.

## 2024-05-28 NOTE — DISCHARGE PLANNING
Care Transition Team Assessment    At 1520, CM RN met with patient at bedside to complete assessment. Patient lives alone without support. I confirmed address and contact information ont he face sheet.   PCP is YESENIA Cervantes  Patient will need meds to beds.   Patient has a dog at home. He assured me his neighbor is caring for the dog.   Anticipate patient will need post acute care.   Patient has a FWW, wheelchair.  He stated a Meals on Wheels referral was done during last hospitalization.   I added information for food pantries to the AVS.     Information Source  Orientation Level: Oriented X4  Information Given By: Patient  Who is responsible for making decisions for patient? : Patient    Readmission Evaluation  Is this a readmission?: Yes - unplanned readmission    Elopement Risk  Legal Hold: No  Ambulatory or Self Mobile in Wheelchair: Yes  Disoriented: No  Psychiatric Symptoms: None  History of Wandering: No  Elopement this Admit: No  Vocalizing Wanting to Leave: No  Displays Behaviors, Body Language Wanting to Leave: No-Not at Risk for Elopement    Interdisciplinary Discharge Planning  Lives with - Patient's Self Care Capacity: Alone and Unable to Care For Self  Patient or legal guardian wants to designate a caregiver: No  Support Systems: None  Housing / Facility: [address] [facility]  Prior Services: None    Discharge Preparedness  What is your plan after discharge?: Uncertain - pending medical team collaboration  Prior Functional Level: Needs Assist with Activities of Daily Living, Ambulatory  Difficulity with ADLs: Bathing, Walking  Difficulty with ADLs Comment: GLF at home  Difficulity with IADLs: Driving    Functional Assesment  Prior Functional Level: Needs Assist with Activities of Daily Living, Ambulatory         Vision / Hearing Impairment  Vision Impairment : Yes  Left Eye Vision: Impaired  Hearing Impairment : Yes  Hearing Impairment: Right Ear  Does Pt Need Special Equipment for the Hearing Impaired?:  No              Domestic Abuse  Have you ever been the victim of abuse or violence?: No  Possible Abuse/Neglect Reported to:: Not Applicable    Psychological Assessment  History of Substance Abuse: None  History of Psychiatric Problems: Yes  Non-compliant with Treatment: No  Newly Diagnosed Illness: Yes    Discharge Risks or Barriers  Discharge risks or barriers?: Lives alone, no community support  Patient risk factors: Cognitive / sensory / physical deficit    Anticipated Discharge Information  Discharge Disposition: D/T to SNF with Medicare cert in anticipation of skilled care (03)

## 2024-05-28 NOTE — ED TRIAGE NOTES
"Pt BIB [facility] for GLF today. Pt sts he was getting something out the microwave and he doesn't remember falling. Pt unsure if he hit his head, unsure of LOC, -thinners. Pt Karma&O at this time. Pt c/o in crease in chronic back pain from fall and has been out of his oxycodone for 2 days. Connected to bedside monitor. Pt sts he has had thoughts of harming himself and gave his gun to his son. Pt sts \"life sucks\" when asked why he was having suicidal thoughts.Call light in reach. Charge notified. Pt also has HI reading for BGL per EMS. 500 of NS given and BGL was down to 590. Pt didn't take insulin.   "

## 2024-05-28 NOTE — ASSESSMENT & PLAN NOTE
Bilateral infiltrates on CT scan  Follow-up blood cultures and sputum cultures  Start ceftriaxone and doxycycline

## 2024-05-28 NOTE — THERAPY
Physical Therapy   Initial Evaluation     Patient Name: Christoph Taylor  Age:  58 y.o., Sex:  male  Medical Record #: [id number]  Today's Date: 5/28/2024     Precautions  Precautions: Fall Risk;Other (See Comments)  Comments: Patient wears L AFO-left at home    Assessment  Patient is 58 y.o. male who presented 5/27/2024 with back pain, suicidal ideation,fall.     Found to have PNA, acute respiratory failure with hypoxia, thoracic back pain, acute esophagitis     PMH: poorly controlled diabetes, history of noncompliance, esophagitis, CKD, HLD   Recent DC on 5/23/2024      Patient seen for PT evaluation. Patient in bed, agreeable for the session. Able to demonstrate functional mobility tasks as detailed below. Currently appears to be below baseline level of functional mobility. Will continue to benefit from PT services to help improve overall functional mobility. Recommend post-acute placement at this time.     Plan    Physical Therapy Initial Treatment Plan   Treatment Plan : Equipment, Gait Training, Stair Training, Therapeutic Activities  Treatment Frequency: 4 Times per Week  Duration: Until Therapy Goals Met    DC Equipment Recommendations: Unable to determine at this time  Discharge Recommendations: Recommend post-acute placement for additional physical therapy services prior to discharge home    Objective     05/28/24 1134   Initial Contact Note    Initial Contact Note Order Received and Verified, Physical Therapy Evaluation in Progress with Full Report to Follow.   Precautions   Precautions Fall Risk;Other (See Comments)   Comments Patient wears L AFO-left at home   Vitals   O2 Delivery Device None - Room Air   Pain   Pain Scales 0 to 10 Scale    Intervention Medication (see MAR);Rest;Repositioned   Pain 0 - 10 Group   Location Back;Hip   Location Orientation Right   Therapist Pain Assessment Prior to Activity;During Activity;Post Activity;Post Activity Pain Same as Prior to Activity;Nurse  Notified  (Hospitalist made aware)   Prior Living Situation   Prior Services None   Housing / Facility 1 Story House   Steps Into Home 6   Steps In Home 0   Rail Both Rail (Steps into Home)  (Rails are farther apart)   Equipment Owned Front-Wheel Walker;Wheelchair;Other (Comments)  (Hiking Pole with adjustable height)   Lives with - Patient's Self Care Capacity Alone and Unable to Care For Self   Comments Patient mentioned that he does not have any friends or family for support. His neighbor has been assisting with feeding his dog.   Prior Level of Functional Mobility   Bed Mobility Independent   Transfer Status Independent   Ambulation Other (Comments)  (Non-ambulatory for the last 2 months)   Assistive Devices Used Wheelchair   Stairs Other (Comments)  (For the last 2 months, patient has not been able to negotiate stairs)   Comments Per PT treatment session from 5/23, patient ambulated about 100 feet with FWW & AFO and supervision, negotiated 10 steps with supervision.   History of Falls   History of Falls Yes   Date of Last Fall   (H/O 1 fall PTA, patient mentioned that his R knee buckled/ gave out when he was standing)   Cognition    Cognition / Consciousness X   Level of Consciousness Alert   Comments Patient verbalized feeling frustrated about his current situation, need to return back to hospital. Patient did become angry when encouraged to take side steps by the bed with FWW, easily calms down.   Passive ROM Lower Body   Passive ROM Lower Body X   Comments L ankle DF/PF-slightly impaired; L foot-foot flat (+)   Active ROM Lower Body    Active ROM Lower Body  X   Comments R hip flexion-limited due to pain; L ankle DF/PF-limited   Strength Lower Body   Lower Body Strength  X   Comments Grossly R knee & ankle WFL; R hip 3-/5; L hip, L knee WFL; L ankle 2-/4   Sensation Lower Body   Lower Extremity Sensation   X   Comments Reported numbness in B/L LE especially in the feet   Lower Body Muscle Tone   Lower Body  Muscle Tone  WDL   Other Treatments   Other Treatments Provided Patient was educated about importance of daily mobility, OOB to chair for meals with assistance from nursing. Discussed about having someone bring his AFO, patient mentioned that nobody can bring his AFO from home. Patient mentioned that his podiatrist has recommended to limit WB on L foot without AFO due to his flat foot. Patient had limited WB on RLE due to R hip pain and was not comfortable WB on L foot. Hospitalist and RN made aware of the situation and limitations to progress with mobility.   Balance Assessment   Sitting Balance (Static) Good   Sitting Balance (Dynamic) Fair +   Standing Balance (Static) Fair -   Standing Balance (Dynamic) Poor +   Weight Shift Sitting Good   Weight Shift Standing Poor  (Reduced weight shift to BLE)   Comments Seated EOB; Standing with FWW; Patient was apprehensive to stand due to R knee giving out   Bed Mobility    Supine to Sit Supervised   Sit to Supine Supervised   Scooting Supervised   Rolling Supervised  (Roll to L)   Comments HOB flat, without use of rails, towards L side of the bed   Gait Analysis   Gait Level Of Assist Refused   Comments Patient refused to ambulate at this time since he does not have his AFO.   Functional Mobility   Sit to Stand Contact Guard Assist   Bed, Chair, Wheelchair Transfer Refused   Mobility Bed-EOB-sit-stand EOB with bed rail support x 1 time-sit-stand EOB with FWW x 1 time-EOB-bed   Comments Cues for hand placement, appropriate LE placement; guarding of R knee to prevent buckling   6 Clicks Assessment - How much HELP from from another person do you currently need... (If the patient hasn't done an activity recently, how much help from another person do you think he/she would need if he/she tried?)   Turning from your back to your side while in a flat bed without using bedrails? 4   Moving from lying on your back to sitting on the side of a flat bed without using bedrails? 4    Moving to and from a bed to a chair (including a wheelchair)? 3   Standing up from a chair using your arms (e.g., wheelchair, or bedside chair)? 3   Walking in hospital room? 2   Climbing 3-5 steps with a railing? 2   6 clicks Mobility Score 18   Patient / Family Goals    Patient / Family Goal #1 Better pain control and be able to move independently   Short Term Goals    Short Term Goal # 1 Patient will perform sit-stand with LRAD with supervision in 6 visits to progress with mobility   Short Term Goal # 2 Patient will perform chair transfers with LRAD with supervision in 6 visits to safely get OOB to chair   Short Term Goal # 3 Patient will ambulate 100 feet with LRAD & AFO with supervision in 6 visits to safely ambulate household distance   Short Term Goal # 4 Patient will negotiate 6 steps with LRAD with supervision in 6 visits to safely get in & out of his house   Education Group   Education Provided Role of Physical Therapist   Role of Physical Therapist Patient Response Patient;Acceptance;Explanation;Verbal Demonstration;Action Demonstration;Reinforcement Needed   Physical Therapy Initial Treatment Plan    Treatment Plan  Equipment;Gait Training;Stair Training;Therapeutic Activities   Treatment Frequency 4 Times per Week   Duration Until Therapy Goals Met   Problem List    Problems Pain;Impaired Transfers;Impaired Ambulation;Functional Strength Deficit;Decreased Activity Tolerance   Anticipated Discharge Equipment and Recommendations   DC Equipment Recommendations Unable to determine at this time   Discharge Recommendations Recommend post-acute placement for additional physical therapy services prior to discharge home   Interdisciplinary Plan of Care Collaboration   IDT Collaboration with  Nursing;Physician   Patient Position at End of Therapy In Bed;Bed Alarm On;Call Light within Reach;Tray Table within Reach;Other (Comments)  (L sidelying in the bed)   Session Information   Date / Session Number   5/28-1(1/4, 6/3)

## 2024-05-28 NOTE — ASSESSMENT & PLAN NOTE
Patient did not lose consciousness or hit his head  Complains of neck pain, back pain and hip pain  PT OT blanca

## 2024-05-28 NOTE — PROGRESS NOTES
Order for AFO to be fitted to patient has been submitted to [facility]. If any questions [facility] can be reached at ph # [phone].

## 2024-05-28 NOTE — THERAPY
"Occupational Therapy   Initial Evaluation     Patient Name: Christoph Taylor  Age:  58 y.o., Sex:  male  Medical Record #: [id number]  Today's Date: 5/28/2024     Precautions  Precautions: (P) Fall Risk  Comments: (P) L AFO, left at home    Assessment  Patient is a 58 y.o. male with a diagnosis of pneumonia. Pt presented on 5/27 after a GLF at home. Per chart, pt has had multiple admissions this month and is failing to care for himself at home. Additional factors influencing patient status / progress: PMHx includes poorly controlled diabetes, history of noncompliance, esophagitis. During OT eval, pt presented with impaired activity tolerance, strength, coordination, functional mobility. Pt refused any standing without his AFO which is at home. Pt was able to don underwear seated EOB and manage over hips in side leaning. Provided pt with foam roll for built-up handle on his utensils due to reported difficulty because of his collapsed R thumb IP joint. Pt reports he has had difficulty caring for himself at home. Recommend post-acute placement.     Plan    Occupational Therapy Initial Treatment Plan   Treatment Interventions: (P) Self Care / Activities of Daily Living, Adaptive Equipment, Neuro Re-Education / Balance, Therapeutic Exercises, Therapeutic Activity  Treatment Frequency: (P) 3 Times per Week  Duration: (P) Until Therapy Goals Met    DC Equipment Recommendations: (P) Unable to determine at this time  Discharge Recommendations: (P) Recommend post-acute placement for additional occupational therapy services prior to discharge home     Subjective    \"My dog is at home, I texted my neighbor to ask if they can care for him.\" Discussed with case management if animal services could pick his dog up if his neighbor can't care for him.      Objective     05/28/24 1432   Initial Contact Note    Initial Contact Note Order Received and Verified, Occupational Therapy Evaluation in Progress with Full Report to Follow. "   Prior Living Situation   Prior Services None   Housing / Facility Mobile Home   Steps Into Home 6   Steps In Home 0   Rail Both Rail (Steps into Home)  (rails are too far apart to use simultaneously)   Bathroom Set up Walk In Shower;Bathtub / Shower Combination   Equipment Owned Front-Wheel Walker;Wheelchair   Lives with - Patient's Self Care Capacity Alone and Unable to Care For Self   Comments Pt reports no local social support. Reports his neighbor is hopefully caring for his dog, discussed with  to arrange for animal control if needed. Provided varying information to home setup and PLOF info   Prior Level of ADL Function   Self Feeding Independent   Grooming / Hygiene Independent   Bathing Other (Comments)  (reports difficulty safely performing, sponge bathes otherwise)   Dressing Independent   Toileting Independent   Comments Reports increased difficulty caring for himself   Prior Level of IADL Function   Medication Management Independent   Laundry Independent   Kitchen Mobility Independent   Finances Independent   Home Management Independent   Shopping Dependent  (has groceries delivered)   Prior Level Of Mobility Uses Wheel Chair for in Home Mobility   Driving / Transportation Other (Comments)  (pt reports he does not leave his home)   Occupation (Pre-Hospital Vocational) Retired Due To Disability   Leisure Interests Pets  (has a 14 y/o border collie)   Comments Reports increased difficulty with IADLs. Had a GLF in the kitchen leading to this admission   History of Falls   History of Falls Yes   Date of Last Fall   (reason for admit, fell in the kitchen)   Precautions   Precautions Fall Risk   Comments L AFO, left at home   Pain   Intervention Declines   Cognition    Cognition / Consciousness X   Level of Consciousness Alert   Attention Impaired   Comments Pleasant and cooperative. Provided varying info regarding home setup and PLOF.   Active ROM Upper Body   Active ROM Upper Body  X   Rt Hand  Moderate Impaired   Comments Collapsed IP joint on R thumb, impairs FMC and opening items   Strength Upper Body   Upper Body Strength  X   Gross Strength Generalized Weakness, Equal Bilaterally.    Sensation Upper Body   Upper Extremity Sensation  Not Tested   Upper Body Muscle Tone   Upper Body Muscle Tone  WDL   Neurological Concerns   Neurological Concerns No   Coordination Upper Body   Coordination X   Fine Motor Coordination Impaired by R thumb IP joint collapse   Balance Assessment   Sitting Balance (Static) Fair +   Sitting Balance (Dynamic) Fair   Weight Shift Sitting Fair   Bed Mobility    Supine to Sit Supervised   Scooting Supervised   Comments HOB flat   ADL Assessment   Eating   (reports difficulty holding utensil due to R thumb IP joint impairment. Provided pt with foam roll and applied to utensil for demo)   Grooming Supervision;Seated   Lower Body Dressing Supervision  (donned underwear seated EOB, declined standing to pull up but able to manage over hips in side leaning)   Toileting   (declined need, reports baseline bowel incontinence and uses depends)   How much help from another person does the patient currently need...   Putting on and taking off regular lower body clothing? 3   Bathing (including washing, rinsing, and drying)? 3   Toileting, which includes using a toilet, bedpan, or urinal? 2   Putting on and taking off regular upper body clothing? 3   Taking care of personal grooming such as brushing teeth? 3   Eating meals? 3   6 Clicks Daily Activity Score 17   Functional Mobility   Sit to Stand Refused   Mobility EOB only, does not want to stand without his AFO   Activity Tolerance   Sitting Edge of Bed functional, asked to stay EOB end of session   Patient / Family Goals   Patient / Family Goal #1 To have more therapy   Short Term Goals   Short Term Goal # 1 Pt will txfer to [facility] with [facility]   Short Term Goal # 2 Pt will perform toileting with [facility]   Short Term Goal # 3 Pt will perform seated  g/h with setup   Education Group   Role of Occupational Therapist Patient Response Patient;Acceptance;Explanation;Verbal Demonstration   ADL Patient Response Patient;Acceptance;Explanation;Verbal Demonstration   Occupational Therapy Initial Treatment Plan    Treatment Interventions Self Care / Activities of Daily Living;Adaptive Equipment;Neuro Re-Education / Balance;Therapeutic Exercises;Therapeutic Activity   Treatment Frequency 3 Times per Week   Duration Until Therapy Goals Met   Problem List   Problem List Decreased Active Daily Living Skills;Decreased Homemaking Skills;Decreased Upper Extremity Strength Right;Decreased Upper Extremity Strength Left;Decreased Functional Mobility;Decreased Activity Tolerance;Impaired Postural Control / Balance   Anticipated Discharge Equipment and Recommendations   DC Equipment Recommendations Unable to determine at this time   Discharge Recommendations Recommend post-acute placement for additional occupational therapy services prior to discharge home   Interdisciplinary Plan of Care Collaboration   IDT Collaboration with  Nursing   Patient Position at End of Therapy Edge of Bed;Call Light within Reach;Tray Table within Reach;Phone within Reach   Collaboration Comments RN updated   Session Information   Date / Session Number  5/28 #1 (1/3, 6/3)

## 2024-05-28 NOTE — CARE PLAN
The patient is Stable - Low risk of patient condition declining or worsening    Shift Goals  Clinical Goals: pt safety during shift  Patient Goals: rest  Family Goals: vicente    Progress made toward(s) clinical / shift goals: Pt transported to floor from ED on gurney. 2 RN skin check completed. Pt safety maintained at this time, call light and personal belongings within reach. Bed locked in low position.     Problem: Pain - Standard  Goal: Alleviation of pain or a reduction in pain to the patient’s comfort goal  Outcome: Progressing     Problem: Fall Risk  Goal: Patient will remain free from falls  Outcome: Progressing     Patient is not progressing towards the following goals:

## 2024-05-28 NOTE — ASSESSMENT & PLAN NOTE
Acute on chronic back pain  CT scans are negative  Straight leg test negative    Ordered MRI back reviewed results  Redo back surgery tentative tuesday

## 2024-05-29 ENCOUNTER — APPOINTMENT (OUTPATIENT)
Dept: RADIOLOGY | Facility: MEDICAL CENTER | Age: 59
DRG: 459 | End: 2024-05-29
Attending: INTERNAL MEDICINE
Payer: COMMERCIAL

## 2024-05-29 LAB
ALBUMIN SERPL BCP-MCNC: 3 G/DL (ref 3.2–4.9)
ALBUMIN/GLOB SERPL: 1.2 G/DL
ALP SERPL-CCNC: 66 U/L (ref 30–99)
ALT SERPL-CCNC: 15 U/L (ref 2–50)
ANION GAP SERPL CALC-SCNC: 9 MMOL/L (ref 7–16)
AST SERPL-CCNC: 10 U/L (ref 12–45)
BASOPHILS # BLD AUTO: 0 % (ref 0–1.8)
BASOPHILS # BLD: 0 K/UL (ref 0–0.12)
BILIRUB SERPL-MCNC: 0.2 MG/DL (ref 0.1–1.5)
BUN SERPL-MCNC: 23 MG/DL (ref 8–22)
CALCIUM ALBUM COR SERPL-MCNC: 9.2 MG/DL (ref 8.5–10.5)
CALCIUM SERPL-MCNC: 8.4 MG/DL (ref 8.5–10.5)
CHLORIDE SERPL-SCNC: 103 MMOL/L (ref 96–112)
CO2 SERPL-SCNC: 27 MMOL/L (ref 20–33)
CREAT SERPL-MCNC: 0.53 MG/DL (ref 0.5–1.4)
EOSINOPHIL # BLD AUTO: 0.26 K/UL (ref 0–0.51)
EOSINOPHIL NFR BLD: 5.4 % (ref 0–6.9)
ERYTHROCYTE [DISTWIDTH] IN BLOOD BY AUTOMATED COUNT: 41.2 FL (ref 35.9–50)
GFR SERPLBLD CREATININE-BSD FMLA CKD-EPI: 115 ML/MIN/1.73 M 2
GLOBULIN SER CALC-MCNC: 2.6 G/DL (ref 1.9–3.5)
GLUCOSE BLD STRIP.AUTO-MCNC: 118 MG/DL (ref 65–99)
GLUCOSE BLD STRIP.AUTO-MCNC: 224 MG/DL (ref 65–99)
GLUCOSE BLD STRIP.AUTO-MCNC: 95 MG/DL (ref 65–99)
GLUCOSE SERPL-MCNC: 113 MG/DL (ref 65–99)
HCT VFR BLD AUTO: 34 % (ref 42–52)
HGB BLD-MCNC: 11.5 G/DL (ref 14–18)
IMM GRANULOCYTES # BLD AUTO: 0.02 K/UL (ref 0–0.11)
IMM GRANULOCYTES NFR BLD AUTO: 0.4 % (ref 0–0.9)
LYMPHOCYTES # BLD AUTO: 1.24 K/UL (ref 1–4.8)
LYMPHOCYTES NFR BLD: 25.6 % (ref 22–41)
MCH RBC QN AUTO: 28.4 PG (ref 27–33)
MCHC RBC AUTO-ENTMCNC: 33.8 G/DL (ref 32.3–36.5)
MCV RBC AUTO: 84 FL (ref 81.4–97.8)
MONOCYTES # BLD AUTO: 0.38 K/UL (ref 0–0.85)
MONOCYTES NFR BLD AUTO: 7.8 % (ref 0–13.4)
NEUTROPHILS # BLD AUTO: 2.95 K/UL (ref 1.82–7.42)
NEUTROPHILS NFR BLD: 60.8 % (ref 44–72)
NRBC # BLD AUTO: 0 K/UL
NRBC BLD-RTO: 0 /100 WBC (ref 0–0.2)
PLATELET # BLD AUTO: 196 K/UL (ref 164–446)
PMV BLD AUTO: 9.1 FL (ref 9–12.9)
POTASSIUM SERPL-SCNC: 3.9 MMOL/L (ref 3.6–5.5)
PROT SERPL-MCNC: 5.6 G/DL (ref 6–8.2)
RBC # BLD AUTO: 4.05 M/UL (ref 4.7–6.1)
SODIUM SERPL-SCNC: 139 MMOL/L (ref 135–145)
WBC # BLD AUTO: 4.9 K/UL (ref 4.8–10.8)

## 2024-05-29 PROCEDURE — 700102 HCHG RX REV CODE 250 W/ 637 OVERRIDE(OP): Performed by: HOSPITALIST

## 2024-05-29 PROCEDURE — A9270 NON-COVERED ITEM OR SERVICE: HCPCS | Performed by: HOSPITALIST

## 2024-05-29 PROCEDURE — 700117 HCHG RX CONTRAST REV CODE 255: Performed by: INTERNAL MEDICINE

## 2024-05-29 PROCEDURE — 36415 COLL VENOUS BLD VENIPUNCTURE: CPT

## 2024-05-29 PROCEDURE — 82962 GLUCOSE BLOOD TEST: CPT | Mod: 91

## 2024-05-29 PROCEDURE — 700105 HCHG RX REV CODE 258: Performed by: HOSPITALIST

## 2024-05-29 PROCEDURE — A9579 GAD-BASE MR CONTRAST NOS,1ML: HCPCS | Performed by: INTERNAL MEDICINE

## 2024-05-29 PROCEDURE — 99232 SBSQ HOSP IP/OBS MODERATE 35: CPT | Performed by: INTERNAL MEDICINE

## 2024-05-29 PROCEDURE — 770006 HCHG ROOM/CARE - MED/SURG/GYN SEMI*

## 2024-05-29 PROCEDURE — 700111 HCHG RX REV CODE 636 W/ 250 OVERRIDE (IP): Performed by: HOSPITALIST

## 2024-05-29 PROCEDURE — 72158 MRI LUMBAR SPINE W/O & W/DYE: CPT

## 2024-05-29 PROCEDURE — 85025 COMPLETE CBC W/AUTO DIFF WBC: CPT

## 2024-05-29 PROCEDURE — 700101 HCHG RX REV CODE 250: Performed by: HOSPITALIST

## 2024-05-29 PROCEDURE — 80053 COMPREHEN METABOLIC PANEL: CPT

## 2024-05-29 PROCEDURE — 72157 MRI CHEST SPINE W/O & W/DYE: CPT

## 2024-05-29 RX ADMIN — SODIUM CHLORIDE: 9 INJECTION, SOLUTION INTRAVENOUS at 13:27

## 2024-05-29 RX ADMIN — ASPIRIN 81 MG: 81 TABLET, COATED ORAL at 19:49

## 2024-05-29 RX ADMIN — SODIUM CHLORIDE: 9 INJECTION, SOLUTION INTRAVENOUS at 05:03

## 2024-05-29 RX ADMIN — OMEPRAZOLE 20 MG: 20 CAPSULE, DELAYED RELEASE ORAL at 19:49

## 2024-05-29 RX ADMIN — OXYCODONE HYDROCHLORIDE 5 MG: 5 TABLET ORAL at 07:58

## 2024-05-29 RX ADMIN — GADOTERIDOL 15 ML: 279.3 INJECTION, SOLUTION INTRAVENOUS at 18:09

## 2024-05-29 RX ADMIN — OXYCODONE HYDROCHLORIDE 5 MG: 5 TABLET ORAL at 19:49

## 2024-05-29 RX ADMIN — DOXYCYCLINE 100 MG: 100 TABLET, FILM COATED ORAL at 19:49

## 2024-05-29 RX ADMIN — DOXYCYCLINE 100 MG: 100 TABLET, FILM COATED ORAL at 05:02

## 2024-05-29 RX ADMIN — CEFTRIAXONE SODIUM 2000 MG: 10 INJECTION, POWDER, FOR SOLUTION INTRAVENOUS at 19:27

## 2024-05-29 RX ADMIN — INSULIN HUMAN 3 UNITS: 100 INJECTION, SOLUTION PARENTERAL at 21:41

## 2024-05-29 RX ADMIN — OMEPRAZOLE 20 MG: 20 CAPSULE, DELAYED RELEASE ORAL at 05:02

## 2024-05-29 ASSESSMENT — PAIN DESCRIPTION - PAIN TYPE
TYPE: ACUTE PAIN
TYPE: CHRONIC PAIN

## 2024-05-29 ASSESSMENT — ENCOUNTER SYMPTOMS
DEPRESSION: 1
DIZZINESS: 0
FALLS: 0
BLOOD IN STOOL: 0
NERVOUS/ANXIOUS: 0
LOSS OF CONSCIOUSNESS: 0
BACK PAIN: 1
WEAKNESS: 0
EYE REDNESS: 0
DIARRHEA: 0
ABDOMINAL PAIN: 0
FEVER: 0
INSOMNIA: 0
FOCAL WEAKNESS: 0
PALPITATIONS: 0
CHILLS: 0
VOMITING: 0
MYALGIAS: 1
WHEEZING: 0
SEIZURES: 0
HEMOPTYSIS: 0
CONSTIPATION: 0
EYE PAIN: 0
HEADACHES: 0
NAUSEA: 0
TREMORS: 0
COUGH: 0
SHORTNESS OF BREATH: 0

## 2024-05-29 NOTE — PROGRESS NOTES
Hospital Medicine Daily Progress Note    Date of Service  5/29/2024    Chief Complaint  Christoph Taylor is a 58 y.o. male admitted 5/27/2024 with Back Pain and Suicidal Ideation        Hospital Course  No notes on file    Interval Problem Update  I reviewed the chart along with vitals, labs, imaging, test (both pending and resulted) and recommendations from specialists and interdisciplinary team.  Managing pneumonia and uncontrolled diabetes  BG 300s initially now improved wityh increased Lantus  Bld cx pending.  Known incontinence and diaper use for months and drop foot with back pain. sees [facility] MRI back PENDING to be done TODAY  R foot XR reviewed chronic degenerative changes and flat foot, he can follow outpatient Orthopedics. Ordered AFO     Patient has complained mainly about foot, denies Charcot, as well as months of fecal incontinence and back pain. Explained diabetes and CT results to him. Patient otherwise stable. Updated and explained plan to him.   PT recommends SNF    I have discussed this patient's plan of care and discharge plan at IDT rounds today with Case Management, Nursing, Nursing leadership, and other members of the IDT team.    Consultants/Specialty      Code Status  Full Code    Disposition    Anticipate discharge to: skilled nursing facility    I have placed the appropriate orders for post-discharge needs.    Review of Systems  Review of Systems   Constitutional:  Negative for chills and fever.   HENT:  Negative for congestion, hearing loss and nosebleeds.    Eyes:  Negative for pain and redness.   Respiratory:  Negative for cough, hemoptysis, shortness of breath and wheezing.    Cardiovascular:  Negative for chest pain and palpitations.   Gastrointestinal:  Negative for abdominal pain, blood in stool, constipation, diarrhea, nausea and vomiting.   Genitourinary:  Negative for dysuria, frequency and hematuria.   Musculoskeletal:  Positive for back pain, joint pain and myalgias.  Negative for falls.   Skin:  Negative for rash.   Neurological:  Negative for dizziness, tremors, focal weakness, seizures, loss of consciousness, weakness and headaches.   Psychiatric/Behavioral:  Positive for depression. The patient is not nervous/anxious and does not have insomnia.    All other systems reviewed and are negative.       Physical Exam  Temp:  [36.2 °C (97.1 °F)-36.4 °C (97.5 °F)] 36.4 °C (97.5 °F)  Pulse:  [66-76] 76  Resp:  [18] 18  BP: ()/(54-81) 139/81  SpO2:  [92 %-97 %] 92 %    Physical Exam  Vitals and nursing note reviewed.   HENT:      Head: Normocephalic and atraumatic.      Right Ear: External ear normal.      Left Ear: External ear normal.      Nose: Nose normal.      Mouth/Throat:      Mouth: Mucous membranes are moist.   Eyes:      General: No scleral icterus.     Conjunctiva/sclera: Conjunctivae normal.   Cardiovascular:      Rate and Rhythm: Normal rate and regular rhythm.      Heart sounds: No murmur heard.     No friction rub. No gallop.   Pulmonary:      Effort: Pulmonary effort is normal.      Breath sounds: Normal breath sounds.   Abdominal:      General: Abdomen is flat. Bowel sounds are normal. There is no distension.      Palpations: Abdomen is soft.      Tenderness: There is no abdominal tenderness. There is no guarding.   Musculoskeletal:         General: Tenderness (myalgia,back pain) present. Normal range of motion.      Cervical back: Normal range of motion and neck supple.   Skin:     General: Skin is warm.   Neurological:      Mental Status: He is alert and oriented to person, place, and time. Mental status is at baseline.      Motor: Weakness present.   Psychiatric:         Mood and Affect: Mood normal.         Behavior: Behavior normal.         Thought Content: Thought content normal.         Judgment: Judgment normal.         Fluids    Intake/Output Summary (Last 24 hours) at 5/29/2024 1647  Last data filed at 5/29/2024 1534  Gross per 24 hour   Intake 720 ml    Output 1325 ml   Net -605 ml       Laboratory  Recent Labs     05/27/24  1950 05/29/24  0315   WBC 5.0 4.9   RBC 3.83* 4.05*   HEMOGLOBIN 10.6* 11.5*   HEMATOCRIT 32.1* 34.0*   MCV 83.8 84.0   MCH 27.7 28.4   MCHC 33.0 33.8   RDW 39.8 41.2   PLATELETCT 190 196   MPV 9.3 9.1     Recent Labs     05/27/24  1950 05/29/24  0315   SODIUM 132* 139   POTASSIUM 4.7 3.9   CHLORIDE 94* 103   CO2 25 27   GLUCOSE 582* 113*   BUN 28* 23*   CREATININE 0.74 0.53   CALCIUM 8.3* 8.4*                   Imaging  DX-FOOT-2- LEFT   Final Result      No acute osseous abnormality.      Prominent degenerative changes within the mid and hindfoot with pes planus type deformity.      CT-LSPINE W/O PLUS RECONS   Final Result         1.  No acute traumatic bony injury of the lumbar spine.      CT-TSPINE W/O PLUS RECONS   Final Result         1.  No acute traumatic bony injury of the thoracic spine.      CT-CHEST,ABDOMEN,PELVIS WITH   Final Result      MR-LUMBAR SPINE-W/O    (Results Pending)   MR-THORACIC SPINE-WITH & W/O    (Results Pending)        Assessment/Plan  * Pneumonia due to infectious organism- (present on admission)  Assessment & Plan  Bilateral infiltrates on CT scan  Follow-up blood cultures and sputum cultures  Start ceftriaxone and doxycycline    Fall  Assessment & Plan  Patient did not lose consciousness or hit his head  Complains of neck pain, back pain and hip pain  PT OT eval    Acute respiratory failure with hypoxia (HCC)- (present on admission)  Assessment & Plan  On 3 L of O2 above baseline  Weaning down to baseline    Thoracic back pain- (present on admission)  Assessment & Plan  Acute on chronic back pain  CT scans are negative  Straight leg test negative    Ordered MRI back PENDING    Diabetes mellitus type 2, insulin dependent, poorly controlled with marked hyperglycemia (HCC)- (present on admission)  Assessment & Plan  Start on insulin sliding scale with serial Accu-Checks  Check hemoglobin A1c  Hypoglycemic  protocol in place  Increased Lantus, uncontrolled   Improved    Acute esophagitis- (present on admission)  Assessment & Plan  Continue Protonix         VTE prophylaxis: VTE Selection    I have performed a physical exam and reviewed and updated ROS and Plan today (5/29/2024). In review of yesterday's note (5/28/2024), there are no changes except as documented above.

## 2024-05-29 NOTE — CARE PLAN
The patient is Stable - Low risk of patient condition declining or worsening    Shift Goals  Clinical Goals: Pain control  Patient Goals: comfort  Family Goals: vicente    Patient asked for PRN pain meds as needed. Pt had no distress or evident depression this shift.    Progress made toward(s) clinical / shift goals:    Problem: Pain - Standard  Goal: Alleviation of pain or a reduction in pain to the patient’s comfort goal  Outcome: Progressing       Patient is not progressing towards the following goals:

## 2024-05-29 NOTE — PROGRESS NOTES
Hospital Medicine Daily Progress Note    Date of Service  5/28/2024    Chief Complaint  Christoph Taylor is a 58 y.o. male admitted 5/27/2024 with Back Pain and Suicidal Ideation        Hospital Course  No notes on file    Interval Problem Update  I reviewed the chart along with vitals, labs, imaging, test (both pending and resulted) and recommendations from specialists and interdisciplinary team.  Managing pneumonia and uncontrolled diabetes  BG 300s  Bld cx pending.    SNF per PT  Ordered AFO  MRI back and R foot XR ordered. Known incontinence and diaper use for months and flat foot sees [facility]   Increased Lantus    I spent time with pateint who complained mainly about foot, denies Charcot, as well as months of fecal incontinence and back pain. Explained diabetes and CT results to him.    Patient is has a high medical complexity, complex decision making and is at high risk for complication, morbidity, and mortality, thus requiring the highest level of my preparedness for sudden, emergent intervention. Medical decision making is therefore complex. I provided  services, which included ordering labs and/or imaging, and discussing the case with various consultants.medication orders, frequent reevaluations of the patient's condition and response to treatment. Time was also devoted to counseling and coordinating care including review of records, pertinent lab data and studies, as well as discussing diagnostic evaluation and work up, planned therapeutic interventions and future disposition of care. Where indicated, the assessment and plan reflect discussion of patient with consultants, other healthcare providers, family members, and additional research needed to obtain further information in formulating the plan of care for Christoph Taylor. Total time spent was 65 minutes.     I have discussed this patient's plan of care and discharge plan at IDT rounds today with Case Management, Nursing, Nursing  leadership, and other members of the IDT team.    Consultants/Specialty      Code Status  Full Code    Disposition  <MEDICALLYCLEARED>  I have placed the appropriate orders for post-discharge needs.    Review of Systems  Review of Systems   Constitutional:  Negative for chills and fever.   HENT:  Negative for congestion, hearing loss and nosebleeds.    Eyes:  Negative for pain and redness.   Respiratory:  Negative for cough, hemoptysis, shortness of breath and wheezing.    Cardiovascular:  Negative for chest pain and palpitations.   Gastrointestinal:  Negative for abdominal pain, blood in stool, constipation, diarrhea, nausea and vomiting.   Genitourinary:  Negative for dysuria, frequency and hematuria.   Musculoskeletal:  Positive for back pain, joint pain and myalgias. Negative for falls.   Skin:  Negative for rash.   Neurological:  Negative for dizziness, tremors, focal weakness, seizures, loss of consciousness, weakness and headaches.   Psychiatric/Behavioral:  Positive for depression. The patient is not nervous/anxious and does not have insomnia.    All other systems reviewed and are negative.       Physical Exam  Temp:  [36.1 °C (97 °F)-36.7 °C (98.1 °F)] 36.1 °C (97 °F)  Pulse:  [60-76] 61  Resp:  [11-18] 18  BP: ()/(55-78) 97/60  SpO2:  [90 %-98 %] 93 %    Physical Exam  Vitals and nursing note reviewed.   HENT:      Head: Normocephalic and atraumatic.      Right Ear: External ear normal.      Left Ear: External ear normal.      Nose: Nose normal.      Mouth/Throat:      Mouth: Mucous membranes are moist.   Eyes:      General: No scleral icterus.     Conjunctiva/sclera: Conjunctivae normal.   Cardiovascular:      Rate and Rhythm: Normal rate and regular rhythm.      Heart sounds: No murmur heard.     No friction rub. No gallop.   Pulmonary:      Effort: Pulmonary effort is normal.      Breath sounds: Normal breath sounds.   Abdominal:      General: Abdomen is flat. Bowel sounds are normal. There is no  distension.      Palpations: Abdomen is soft.      Tenderness: There is no abdominal tenderness. There is no guarding.   Musculoskeletal:         General: Tenderness (myalgia,back pain) present. Normal range of motion.      Cervical back: Normal range of motion and neck supple.   Skin:     General: Skin is warm.   Neurological:      Mental Status: He is alert and oriented to person, place, and time. Mental status is at baseline.      Motor: Weakness present.   Psychiatric:         Mood and Affect: Mood normal.         Behavior: Behavior normal.         Thought Content: Thought content normal.         Judgment: Judgment normal.         Fluids    Intake/Output Summary (Last 24 hours) at 5/28/2024 1721  Last data filed at 5/28/2024 0900  Gross per 24 hour   Intake 1240 ml   Output 825 ml   Net 415 ml       Laboratory  Recent Labs     05/27/24  1950   WBC 5.0   RBC 3.83*   HEMOGLOBIN 10.6*   HEMATOCRIT 32.1*   MCV 83.8   MCH 27.7   MCHC 33.0   RDW 39.8   PLATELETCT 190   MPV 9.3     Recent Labs     05/27/24  1950   SODIUM 132*   POTASSIUM 4.7   CHLORIDE 94*   CO2 25   GLUCOSE 582*   BUN 28*   CREATININE 0.74   CALCIUM 8.3*                   Imaging  DX-FOOT-2- LEFT   Final Result      No acute osseous abnormality.      Prominent degenerative changes within the mid and hindfoot with pes planus type deformity.      CT-LSPINE W/O PLUS RECONS   Final Result         1.  No acute traumatic bony injury of the lumbar spine.      CT-TSPINE W/O PLUS RECONS   Final Result         1.  No acute traumatic bony injury of the thoracic spine.      CT-CHEST,ABDOMEN,PELVIS WITH   Final Result      MR-LUMBAR SPINE-W/O    (Results Pending)   MR-THORACIC SPINE-WITH & W/O    (Results Pending)        Assessment/Plan  * Pneumonia due to infectious organism- (present on admission)  Assessment & Plan  Bilateral infiltrates on CT scan  Follow-up blood cultures and sputum cultures  Start ceftriaxone and doxycycline    Fall  Assessment &  Plan  Patient did not lose consciousness or hit his head  Complains of neck pain, back pain and hip pain  PT OT eval    Acute respiratory failure with hypoxia (HCC)- (present on admission)  Assessment & Plan  On 3 L of O2 above baseline    Thoracic back pain- (present on admission)  Assessment & Plan  Acute on chronic back pain  CT scans are negative  Straight leg test negative    Ordered MRI back and R foot XR    Diabetes mellitus type 2, insulin dependent, poorly controlled with marked hyperglycemia (HCC)- (present on admission)  Assessment & Plan  Start on insulin sliding scale with serial Accu-Checks  Check hemoglobin A1c  Hypoglycemic protocol in place  Increased Lantus, uncontrolled     Acute esophagitis- (present on admission)  Assessment & Plan  Continue Protonix         VTE prophylaxis: VTE Selection    I have performed a physical exam and reviewed and updated ROS and Plan today (5/28/2024). In review of yesterday's note (5/27/2024), there are no changes except as documented above.

## 2024-05-29 NOTE — PROGRESS NOTES
Pt is Aox4; not in distress; vitals signs stable; blood sugar checked, 101mg/dl; pt states he's hungry, provided snacks. Call light within easy reach. Needs attended. Bed alarm is on.

## 2024-05-29 NOTE — CARE PLAN
The patient is Watcher - Medium risk of patient condition declining or worsening    Shift Goals  Clinical Goals: Pt will get MRI done by the end of shift.  Patient Goals: Rest  Family Goals: KATHERINE  Pt able to tolerate completion of MRI.    Progress made toward(s) clinical / shift goals:    Problem: Pain - Standard  Goal: Alleviation of pain or a reduction in pain to the patient’s comfort goal  Outcome: Progressing  Pts pain was maintained at a comfortable level with medications given as per MAR and rest with even chest rise and fall.       Problem: Skin Integrity  Goal: Skin integrity is maintained or improved  Outcome: Progressing   Pts skin integrity was maintained with use of commode beside, cleaning of soiled linens, and self turning in bed.     Patient is not progressing towards the following goals:NA

## 2024-05-29 NOTE — CONSULTS
"[facility]    INPATIENT ASSESSMENT    Name: Christoph Taylor  MRN: [id number]  : 1965  Age: 58 y.o.  Date of assessment: 2024  PCP: YESY Tariq.  Persons in attendance: Patient    HPI: Per Medical Record: \"Christoph Taylor is a 58 y.o. male who presents to the emergency room for evaluation of injury sustained from a ground-level fall.  Patient is diabetic, he has CKD.  He felt like he was sugars might be off so he was making himself a meal prior to giving self his normal amounts of 20 units of long-acting insulin.  While sitting up from his wheelchair which he normally uses because of chronic weakness from prior old compression fracture he was feeling off balance and felt on his left side.  He went down and struck both counter on the ground with his chest and parts of his flank. \" Patient was referred to Behavioral Health for a psychotherapy session due to depression.    Psychotherapy Session Summary (as stated by Patient): Christoph Taylor is a 58 year old male who was seen lying on hospital bed, alert, oriented, speech clear, no sign of a thought disorder, flat affect with no range of emotion. Patient reports feelings of depression and loneliness. Patient states he raised his sons alone due to his former wife's substance use problem. Patient is now an empty dasia, and retired from working. Patient states he has no friends and often spends time alone. Patient is also concerned about the deterioration of his health and the lack of mobility has exacerbated his depression and feelings of hopelessness. Patient states, \"I can barely take care of myself\". Patient reports not being able to cook or clean for himself due to mobility issues. \"I think I need to go to a nursing home until I get better\". Patient reports , \"I had my son come and get my gun and take it out of my home\". Patient denies an intent to commit suicide but states, \"the way I was feeling, I just wanted to be " "safe\".  Patient denies current suicidal ideations, intent or plan but states, \"I really don't know how to get out of these feelings I have\".  Clinician processed with patient his feelings helplessness using Cognitive Behavioral therapy to challenge automatic negative self talk and how to manage anxiety and depression related to these feelings and thoughts. Patient states he does not know how to develop a sense of hope. Clinician discussed with patient ways to start developing hope and positive self talk. Patient states he is challenged in this area and agreed to meet again to continue this process. Patient states he is also open to discussing psychotropic medication.    Chief Complaint   Patient presents with    Back Pain    Suicidal Ideation        CURRENT LIVING SITUATION/SOCIAL SUPPORT: Patient is retired and lives alone. He has three sons who reside in [place]. Patient reports no outside interests and no support system.    BEHAVIORAL HEALTH TREATMENT HISTORY  Does patient/parent report a history of prior behavioral health treatment for patient?   No:    SAFETY ASSESSMENT - SELF  Does patient acknowledge current or past symptoms of dangerousness to self? no  Does parent/significant other report patient has current or past symptoms of dangerousness to self? no  Does presenting problem suggest symptoms of dangerousness to self? No-patient acknowledges hopelessness but denies current plan for suicide. Patient had his gun removed from his home as a precautionary measure.    SAFETY ASSESSMENT - OTHERS  Does patient acknowledge current or past symptoms of aggressive behavior or risk to others? no  Does parent/significant other report patient has current or past symptoms of aggressive behavior or risk to others?  N\A  Does presenting problem suggest symptoms of dangerousness to others? No    Crisis Safety Plan completed and copy given to patient? No-however patient committed to no suicide and wants to continue " "therapy and desires a medication management referral.    ABUSE/NEGLECT SCREENING  Does patient report feeling “unsafe” in his/her home, or afraid of anyone?  no  Does patient report any history of physical, sexual, or emotional abuse?  no  Does parent or significant other report any of the above? N\A  Is there evidence of neglect by self?  no  Is there evidence of neglect by a caregiver? no  Does the patient/parent report any history of CPS/APS/police involvement related to suspected abuse/neglect or domestic violence? no  Based on the information provided during the current assessment, is a mandated report of suspected abuse/neglect being made?  No    SUBSTANCE USE SCREENING  Yes:  Merlin all substances used in the past 30 days:      Last Use Amount   []   Alcohol     []   Marijuana     []   Heroin     []   Prescription Opioids  (used without prescription, for    recreation, or in excess of prescribed amount)     []   Other Prescription  (used without prescription, for    recreation, or in excess of prescribed amount)     []   Cocaine      []   Methamphetamine     []   \"\" drugs (ectasy, MDMA)     []   Other substances        UDS results: opiates  Breathalyzer results: pending    What consequences does the patient associate with any of the above substance use and or addictive behaviors? Health problems:     Risk factors for detox (check all that apply):  []  Seizures   []  Diaphoretic (sweating)   []  Tremors   []  Hallucinations   []  Increased blood pressure   []  Decreased blood pressure   []  Other   []  None      [] Patient education on risk factors for detoxification and instructed to return to ER as needed.      MENTAL STATUS              Participation: Limited verbal participation  Grooming: Casual  Orientation: Alert  Behavior: Tense  Eye contact: Limited  Mood: Depressed  Affect: Flat  Thought process: Circumstantial  Thought content: Within normal limits  Speech: Soft  Perception: Within normal " limits  Memory:  Recent:  Adequate  Insight: Adequate  Judgment:  Adequate  Other:    Collateral information:   Source:   Significant other present in person:    Significant other by telephone   [facility]   [facility] Record-reviewed   Other: [facility] physician     Unable to complete full assessment due to:   Acute intoxication   Patient declined to participate/engage   Patient verbally unresponsive   Significant cognitive deficits   Significant perceptual distortions or behavioral disorganization   Other:             CLINICAL IMPRESSIONS:  Primary:  Major Depressive Disorder Moderate  Secondary:  R/O other depressive disorder due to medical condition                                       IDENTIFIED NEEDS/PLAN:  [Trigger DISPOSITION list for any items marked]      Imminent safety risk - self  Imminent safety risk - others     Acute substance withdrawal   Psychosis/Impaired reality testing    x Mood/anxiety   Substance use/Addictive behavior     Maladaptive behavior   Parent/child conflict     Family/Couples conflict   Biomedical     Housing   Financial      Legal  Occupational/Educational     Domestic violence   Other:     Recommendations and Observation Level:  Please refer patient to psychiatry for a medication evaluation.    Patient desires a referral to skilled nursing prior to discharge.    Legal Hold: N/A    Thank you,      Farzana Tripathi, Ph.D., [facility]  5/28/2024   Length of intervention: 30 minutes

## 2024-05-29 NOTE — PROGRESS NOTES
I saw Christoph Taylor 58 y.o. malepresents with Back Pain and Suicidal Ideation   on 5/27/2024   Managing pneumonia and uncontrolled diabetes  BG 300s  Bld cx pending.    SNF per PT  Ordered AFO  MRI back and R foot XR ordered. Known incontinence and diaper use for months and flat foort sees [facility]     A/P.  Principal Problem:    Pneumonia due to infectious organism (POA: Yes)  Active Problems:    Acute esophagitis (POA: Yes)    Diabetes mellitus type 2, insulin dependent, poorly controlled with marked hyperglycemia (HCC) (POA: Yes)    Thoracic back pain (POA: Yes)    Acute respiratory failure with hypoxia (HCC) (POA: Yes)    Fall (POA: Unknown)     ;;

## 2024-05-29 NOTE — CONSULTS
Diabetes education: Pt has a hx of diabetes, on insulin prior to admit. Pt was admitted with blood sugar of 582, and hga1c of 12.4%.  Pt is currently on Glargine 25 units BID, ( increased from 20 units BID with first dose this pm)and regular insulin per sliding scale coverage ac and hs . Blood sugars are 339 ( 6 units), and 286 ( 5 units).  Met with pt this afternoon. Pt states he is comfortable with giving insulin via pen, has insulin and supplies, including meter and strips, at home and has no questions.  Plan: Pt has no education needs at this time. Please reorder education or send text if needs change.

## 2024-05-30 ENCOUNTER — APPOINTMENT (OUTPATIENT)
Dept: RADIOLOGY | Facility: MEDICAL CENTER | Age: 59
DRG: 459 | End: 2024-05-30
Attending: NEUROLOGICAL SURGERY
Payer: COMMERCIAL

## 2024-05-30 PROBLEM — M48.061 LUMBAR STENOSIS: Status: ACTIVE | Noted: 2024-05-30

## 2024-05-30 PROBLEM — G83.4 CAUDA EQUINA COMPRESSION (HCC): Status: ACTIVE | Noted: 2024-05-30

## 2024-05-30 LAB
ALBUMIN SERPL BCP-MCNC: 3.1 G/DL (ref 3.2–4.9)
BUN SERPL-MCNC: 18 MG/DL (ref 8–22)
CALCIUM ALBUM COR SERPL-MCNC: 9.1 MG/DL (ref 8.5–10.5)
CALCIUM SERPL-MCNC: 8.4 MG/DL (ref 8.5–10.5)
CHLORIDE SERPL-SCNC: 105 MMOL/L (ref 96–112)
CO2 SERPL-SCNC: 26 MMOL/L (ref 20–33)
CREAT SERPL-MCNC: 0.49 MG/DL (ref 0.5–1.4)
ERYTHROCYTE [DISTWIDTH] IN BLOOD BY AUTOMATED COUNT: 39.8 FL (ref 35.9–50)
GFR SERPLBLD CREATININE-BSD FMLA CKD-EPI: 117 ML/MIN/1.73 M 2
GLUCOSE BLD STRIP.AUTO-MCNC: 115 MG/DL (ref 65–99)
GLUCOSE BLD STRIP.AUTO-MCNC: 115 MG/DL (ref 65–99)
GLUCOSE BLD STRIP.AUTO-MCNC: 122 MG/DL (ref 65–99)
GLUCOSE BLD STRIP.AUTO-MCNC: 180 MG/DL (ref 65–99)
GLUCOSE BLD STRIP.AUTO-MCNC: 86 MG/DL (ref 65–99)
GLUCOSE SERPL-MCNC: 79 MG/DL (ref 65–99)
HCT VFR BLD AUTO: 33 % (ref 42–52)
HGB BLD-MCNC: 11.4 G/DL (ref 14–18)
MCH RBC QN AUTO: 28.7 PG (ref 27–33)
MCHC RBC AUTO-ENTMCNC: 34.5 G/DL (ref 32.3–36.5)
MCV RBC AUTO: 83.1 FL (ref 81.4–97.8)
PHOSPHATE SERPL-MCNC: 3.6 MG/DL (ref 2.5–4.5)
PLATELET # BLD AUTO: 219 K/UL (ref 164–446)
PMV BLD AUTO: 8.9 FL (ref 9–12.9)
POTASSIUM SERPL-SCNC: 4 MMOL/L (ref 3.6–5.5)
RBC # BLD AUTO: 3.97 M/UL (ref 4.7–6.1)
SODIUM SERPL-SCNC: 142 MMOL/L (ref 135–145)
WBC # BLD AUTO: 4.2 K/UL (ref 4.8–10.8)

## 2024-05-30 PROCEDURE — 99418 PROLNG IP/OBS E/M EA 15 MIN: CPT | Performed by: INTERNAL MEDICINE

## 2024-05-30 PROCEDURE — 90837 PSYTX W PT 60 MINUTES: CPT | Performed by: SOCIAL WORKER

## 2024-05-30 PROCEDURE — 85027 COMPLETE CBC AUTOMATED: CPT

## 2024-05-30 PROCEDURE — 700101 HCHG RX REV CODE 250: Performed by: HOSPITALIST

## 2024-05-30 PROCEDURE — 82962 GLUCOSE BLOOD TEST: CPT

## 2024-05-30 PROCEDURE — A9270 NON-COVERED ITEM OR SERVICE: HCPCS | Performed by: HOSPITALIST

## 2024-05-30 PROCEDURE — 36415 COLL VENOUS BLD VENIPUNCTURE: CPT

## 2024-05-30 PROCEDURE — 80069 RENAL FUNCTION PANEL: CPT

## 2024-05-30 PROCEDURE — 700111 HCHG RX REV CODE 636 W/ 250 OVERRIDE (IP): Performed by: HOSPITALIST

## 2024-05-30 PROCEDURE — 770006 HCHG ROOM/CARE - MED/SURG/GYN SEMI*

## 2024-05-30 PROCEDURE — 99233 SBSQ HOSP IP/OBS HIGH 50: CPT | Mod: 25 | Performed by: INTERNAL MEDICINE

## 2024-05-30 PROCEDURE — 700102 HCHG RX REV CODE 250 W/ 637 OVERRIDE(OP): Performed by: HOSPITALIST

## 2024-05-30 RX ORDER — UREA 10 %
5 LOTION (ML) TOPICAL NIGHTLY PRN
Status: DISPENSED | OUTPATIENT
Start: 2024-05-30

## 2024-05-30 RX ADMIN — DOXYCYCLINE 100 MG: 100 TABLET, FILM COATED ORAL at 04:16

## 2024-05-30 RX ADMIN — ASPIRIN 81 MG: 81 TABLET, COATED ORAL at 17:16

## 2024-05-30 RX ADMIN — OMEPRAZOLE 20 MG: 20 CAPSULE, DELAYED RELEASE ORAL at 04:16

## 2024-05-30 RX ADMIN — OXYCODONE HYDROCHLORIDE 5 MG: 5 TABLET ORAL at 13:21

## 2024-05-30 RX ADMIN — INSULIN HUMAN 2 UNITS: 100 INJECTION, SOLUTION PARENTERAL at 18:13

## 2024-05-30 RX ADMIN — OXYCODONE HYDROCHLORIDE 5 MG: 5 TABLET ORAL at 20:35

## 2024-05-30 RX ADMIN — OXYCODONE HYDROCHLORIDE 5 MG: 5 TABLET ORAL at 04:16

## 2024-05-30 RX ADMIN — CEFTRIAXONE SODIUM 2000 MG: 10 INJECTION, POWDER, FOR SOLUTION INTRAVENOUS at 17:16

## 2024-05-30 RX ADMIN — OMEPRAZOLE 20 MG: 20 CAPSULE, DELAYED RELEASE ORAL at 17:16

## 2024-05-30 RX ADMIN — DOXYCYCLINE 100 MG: 100 TABLET, FILM COATED ORAL at 17:16

## 2024-05-30 ASSESSMENT — ENCOUNTER SYMPTOMS
SHORTNESS OF BREATH: 0
DIZZINESS: 0
ABDOMINAL PAIN: 0
FOCAL WEAKNESS: 0
FEVER: 0
WHEEZING: 0
NAUSEA: 0
INSOMNIA: 0
LOSS OF CONSCIOUSNESS: 0
HEADACHES: 0
EYE PAIN: 0
CHILLS: 0
COUGH: 0
SEIZURES: 0
DIARRHEA: 0
MYALGIAS: 1
EYE REDNESS: 0
HEMOPTYSIS: 0
BLOOD IN STOOL: 0
VOMITING: 0
BACK PAIN: 1
CONSTIPATION: 0
FALLS: 0
DEPRESSION: 1
WEAKNESS: 0
NERVOUS/ANXIOUS: 0
TREMORS: 0
PALPITATIONS: 0

## 2024-05-30 ASSESSMENT — PAIN DESCRIPTION - PAIN TYPE
TYPE: ACUTE PAIN
TYPE: CHRONIC PAIN
TYPE: ACUTE PAIN
TYPE: CHRONIC PAIN

## 2024-05-30 NOTE — CONSULTS
"[facility]    INPATIENT ASSESSMENT    Name: Christoph Taylor  MRN: [id number]  : 1965  Age: 58 y.o.  Date of assessment: 2024  PCP: YESY Tariq.  Persons in attendance: Patient    HPI: Per Medical Record: \"Christoph Taylor is a 58 y.o. male who presents to the emergency room for evaluation of injury sustained from a ground-level fall.  Patient is diabetic, he has CKD.  He felt like he was sugars might be off so he was making himself a meal prior to giving self his normal amounts of 20 units of long-acting insulin.  While sitting up from his wheelchair which he normally uses because of chronic weakness from prior old compression fracture he was feeling off balance and felt on his left side.  He went down and struck both counter on the ground with his chest and parts of his flank. \" Patient was referred to Behavioral Health for a psychotherapy session due to depression.       CHIEF COMPLAINT/PRESENTING ISSUE (as stated by Patient): Christoph Taylor is a 58 year old male who was seen lying on hospital bed, alert, oriented, speech clear, no sign of a thought disorder, flat affect with no range of emotion. Patient reports feelings of depression and loneliness. Patient struggles with significant cognitive distortions stating, \"I'm just gonna lay here a rot\". Patient continues, \"no body cares about me, the doctor didn't come see me yesterday, they did an MRI on my head then said, oops wrong person, I just need to get out of this place\". Patient requests to meet with patient experience.    Clinician spent significant time providing service recovery. Patient presents as very depressed and hopeless. Clinician processed his feelings and provided support and encouragement. Patient is challenged with feelings of helplessness and is despondent. Clinician encouraged patient to consider cognitive restructuring, positive self talk and creating a sense of hope. Discussed with patient areas of " his life that he can focus on which could help generate hope. Patient was able to discuss his sons and his relationship with them. Patient further his desire for treatment with his back to relieve pain, and to have his foot drop supported with the brace that was brought to his room yesterday. Patient is also open to going to a skilled nursing since his ability to care for self at home is limited. Patient also reports being hungry but feeling embarrassed to ask for food.    Clinician discussed with Attending physician patient questions and concern. Physician  joined clinician to meet with patient and discuss care plan. Physician answered patient questions and concerns at length. Additionally, physician agreed to change diet to regular instead of diabetic diet unless his glucose levels change, then it was explained to patient that the diabetic diet would have to be implemented. Patient stated he understood. Informed Bedside RN.    Clinician will continue to provide support and psychotherapy. Discussed with patient the possible benefit of adding an antidepressant. Patient also agreed to a medication evaluation for depression.    Chief Complaint   Patient presents with    Back Pain    Suicidal Ideation        CURRENT LIVING SITUATION/SOCIAL SUPPORT: Patient resides alone, he has three adult sons. He is retired. No friends or support system.    BEHAVIORAL HEALTH TREATMENT HISTORY  Does patient/parent report a history of prior behavioral health treatment for patient?   No:    SAFETY ASSESSMENT - SELF  Does patient acknowledge current or past symptoms of dangerousness to self? No-patient reports feeling hopeless but gave his gun to his son to avoid any impulses to harm self. Patient is denying suicidal ideations at the time of this interview.    Does parent/significant other report patient has current or past symptoms of dangerousness to self? N\A  Does presenting problem suggest symptoms of dangerousness to self?  "No    SAFETY ASSESSMENT - OTHERS  Does patient acknowledge current or past symptoms of aggressive behavior or risk to others? no  Does parent/significant other report patient has current or past symptoms of aggressive behavior or risk to others?  N\A  Does presenting problem suggest symptoms of dangerousness to others? No    Crisis Safety Plan completed and copy given to patient? No-patient reports hopelessness without a plan to harm self at the time of interview. Patient wants treatment for his depression and has consented to psychotherapy and the start of an antidepressant if deemed appropriate.    ABUSE/NEGLECT SCREENING  Does patient report feeling “unsafe” in his/her home, or afraid of anyone?  no  Does patient report any history of physical, sexual, or emotional abuse?  no  Does parent or significant other report any of the above? N\A  Is there evidence of neglect by self?  no  Is there evidence of neglect by a caregiver? no  Does the patient/parent report any history of CPS/APS/police involvement related to suspected abuse/neglect or domestic violence? no  Based on the information provided during the current assessment, is a mandated report of suspected abuse/neglect being made?  No    SUBSTANCE USE SCREENING  Yes:  Merlin all substances used in the past 30 days:      Last Use Amount   []   Alcohol     []   Marijuana     []   Heroin     []   Prescription Opioids  (used without prescription, for    recreation, or in excess of prescribed amount)     []   Other Prescription  (used without prescription, for    recreation, or in excess of prescribed amount)     []   Cocaine      []   Methamphetamine     []   \"\" drugs (ectasy, MDMA)     []   Other substances        UDS results: opiates  Diagnostic alcohol results: <10.1    What consequences does the patient associate with any of the above substance use and or addictive behaviors? None    Risk factors for detox (check all that apply):  []  Seizures   []  " Diaphoretic (sweating)   []  Tremors   []  Hallucinations   []  Increased blood pressure   []  Decreased blood pressure   []  Other   []  None      [] Patient education on risk factors for detoxification and instructed to return to ER as needed.      MENTAL STATUS              Participation: Limited verbal participation  Grooming: Disheveled  Orientation: Alert  Behavior: Tense  Eye contact: Limited  Mood: Depressed  Affect: Flat  Thought process: Circumstantial  Thought content: Within normal limits  Speech: Soft  Perception: Within normal limits  Memory:  No gross evidence of memory deficits  Insight: Adequate  Judgment:  Adequate  Other:    Collateral information:   Source:   Significant other present in person:    Significant other by telephone   [facility] Nursing Staff-consulted   [facility] Medical Record-reviewed   Other: physician     Unable to complete full assessment due to:   Acute intoxication   Patient declined to participate/engage   Patient verbally unresponsive   Significant cognitive deficits   Significant perceptual distortions or behavioral disorganization   Other:             CLINICAL IMPRESSIONS:         Primary:  Major Depressive Disorder Moderate  Secondary:  R/O other depressive disorder due to medical condition                                    IDENTIFIED NEEDS/PLAN:  [Trigger DISPOSITION list for any items marked]      Imminent safety risk - self  Imminent safety risk - others     Acute substance withdrawal   Psychosis/Impaired reality testing    x Mood/anxiety   Substance use/Addictive behavior     Maladaptive behavior   Parent/child conflict     Family/Couples conflict   Biomedical     Housing   Financial      Legal  Occupational/Educational     Domestic violence   Other:     Recommendations and Observation Level:  Psychiatry to follow up with medication evaluation      Legal Hold: N/A    Thank you,      Farzana Tripathi, Ph.D., [facility]  5/30/2024   Length of intervention: 60  minutes

## 2024-05-30 NOTE — DISCHARGE PLANNING
@[id number]  DPA faxed a referral over to [facility]    @[phone]  DPA faxed a referral to the [facility]'s.

## 2024-05-30 NOTE — CONSULTS
DATE OF SERVICE:  05/30/2024     INPATIENT CONSULTATION     CHIEF COMPLAINT:  Back pain, neurogenic claudication, lumbar stenosis.     HISTORY OF PRESENT ILLNESS:  This is a 58-year-old man who has a slightly   confusing historian overall, but as best I could tell is some medical issues   including poorly controlled diabetes, generalized debilitation with   intermittent admissions for abdominal pain and chronic diarrhea.  He has been   worked up for this extensively.  He had a fall.  He says 5 days ago, but he   was admitted 10 days ago and he reported increasing weakness in the right leg   and numbness in the left foot.  He denied bladder or bowel incontinence, but   there is this chronic diarrhea?  His MRI of the lumbar spine shows critical   L4-L5 spondylosis, stenosis and looking in summary back with his CAT scan.  He   has mostly been L5 laminectomy, S1 laminectomy with onlay fusion.  He said   the surgery was greater than 30 years ago making it clear why there is no   hardware.     And also in the secondary questioning, after reviewing the interossei wasting   in his hands, he is dropping things more frequently and increasing weakness in   the hands with neck pain.  He denies radiculopathy, but deserves an MRI of   the cervical spine.     PAST MEDICAL HISTORY:  Extensive and is listed in the chart, but is also   includes history of acute kidney injury, chest pain, chronic stage III kidney   disease, diabetes, diarrhea, esophagitis.     PAST SURGICAL HISTORY:  Include a GI, endoscopies, left thumb partial   excision.     SOCIAL HISTORY:  He is a former smoker, does not drink.     PHYSICAL EXAMINATION:  NEUROLOGIC:  He definitely has bilateral interossei wasting with some   spasticity in the upper extremities.  There is no Erika reflex.  His   strength is full in the deltoid, bicep, tricep, but is clearly weak in   the interossei, 4-/5 bilaterally.  His strength is full proximally in the lower extremities,    iliopsoas, quadriceps.  The right dorsiflexion is 4/5, left EHL is 4/5,   plantar flexion is full in the right, but is limited by a flat foot on the   left, but is 4/5.  He has intact sensation except distally in the lower   extremities, but is intact in the upper extremities.     ASSESSMENT AND PLAN:  The patient has junctional severe critical lumbar   spondylosis, stenosis.  Check flexion, extension films to make sure there is   no spondylolisthesis, but likely, will just need redo L5 and L4 laminectomy   and elected to do this on Thursday.  He will stay admitted till then.  Also,   because of the evidence of myelopathy in the upper extremities, will get a   cervical MRI.  He needs medical clearance from the medical service to _____   handle a 2 hour prone procedure on Tuesday.     Thanks for allowing me to participate in his care.        ______________________________  MD LUIS Swift III/BLANCA    DD:  05/30/2024 14:58  DT:  05/30/2024 15:33    Job#:  [id number]

## 2024-05-30 NOTE — CARE PLAN
The patient is Watcher - Medium risk of patient condition declining or worsening    Shift Goals  Clinical Goals: Pt will maintain pain <7 throughout shift.  Patient Goals: Rest  Family Goals: KATHERINE  Pts pain maintained throughout shift with medications given as per MAR and distraction by tv and cellphone.    Progress made toward(s) clinical / shift goals:    Problem: Pain - Standard  Goal: Alleviation of pain or a reduction in pain to the patient’s comfort goal  Outcome: Progressing  Pts pain was within patients comfort level with the use of medications as per MAR.     Problem: Depression  Goal: Patient and family/caregiver will verbalize accurate information about at least two of the possible causes of depression, three-four of the signs and symptoms of depression  Outcome: Progressing   Pt seen by psych during the shift and was able to verbalize feelings of depression and the motivation to get better and on medications.     Patient is not progressing towards the following goals:NA

## 2024-05-30 NOTE — CARE PLAN
The patient is Watcher - Medium risk of patient condition declining or worsening    Shift Goals  Clinical Goals: antibiotic, pain management  Patient Goals: Rest  Family Goals: KATHERINE    Progress made toward(s) clinical / shift goals:  pt is Aox4; aware of his care plan; went for MRI of his back yesterday; pending result; pt c/o pain to his back, medicated with Oxycodone accordingly with relief; pt able to get some sleep after; continue to receive antibiotic for pneumonia. Pt voiding freely, clear yellow urine.    Patient is not progressing towards the following goals:

## 2024-05-31 ENCOUNTER — APPOINTMENT (OUTPATIENT)
Dept: RADIOLOGY | Facility: MEDICAL CENTER | Age: 59
DRG: 459 | End: 2024-05-31
Attending: NEUROLOGICAL SURGERY
Payer: COMMERCIAL

## 2024-05-31 VITALS
RESPIRATION RATE: 18 BRPM | WEIGHT: 170.86 LBS | SYSTOLIC BLOOD PRESSURE: 124 MMHG | DIASTOLIC BLOOD PRESSURE: 66 MMHG | HEART RATE: 87 BPM | HEIGHT: 72 IN | OXYGEN SATURATION: 91 % | BODY MASS INDEX: 23.14 KG/M2 | TEMPERATURE: 98.1 F

## 2024-05-31 LAB
25(OH)D3 SERPL-MCNC: 13 NG/ML (ref 30–100)
ALBUMIN SERPL BCP-MCNC: 3.1 G/DL (ref 3.2–4.9)
APTT PPP: 29.6 SEC (ref 24.7–36)
BUN SERPL-MCNC: 18 MG/DL (ref 8–22)
CALCIUM ALBUM COR SERPL-MCNC: 9 MG/DL (ref 8.5–10.5)
CALCIUM SERPL-MCNC: 8.3 MG/DL (ref 8.5–10.5)
CHLORIDE SERPL-SCNC: 102 MMOL/L (ref 96–112)
CO2 SERPL-SCNC: 27 MMOL/L (ref 20–33)
CREAT SERPL-MCNC: 0.49 MG/DL (ref 0.5–1.4)
ERYTHROCYTE [DISTWIDTH] IN BLOOD BY AUTOMATED COUNT: 40.8 FL (ref 35.9–50)
GFR SERPLBLD CREATININE-BSD FMLA CKD-EPI: 117 ML/MIN/1.73 M 2
GLUCOSE BLD STRIP.AUTO-MCNC: 133 MG/DL (ref 65–99)
GLUCOSE BLD STRIP.AUTO-MCNC: 181 MG/DL (ref 65–99)
GLUCOSE BLD STRIP.AUTO-MCNC: 192 MG/DL (ref 65–99)
GLUCOSE BLD STRIP.AUTO-MCNC: 217 MG/DL (ref 65–99)
GLUCOSE BLD STRIP.AUTO-MCNC: 234 MG/DL (ref 65–99)
GLUCOSE BLD STRIP.AUTO-MCNC: 50 MG/DL (ref 65–99)
GLUCOSE BLD STRIP.AUTO-MCNC: 58 MG/DL (ref 65–99)
GLUCOSE BLD STRIP.AUTO-MCNC: 73 MG/DL (ref 65–99)
GLUCOSE SERPL-MCNC: 220 MG/DL (ref 65–99)
HCT VFR BLD AUTO: 34.7 % (ref 42–52)
HGB BLD-MCNC: 11.4 G/DL (ref 14–18)
INR PPP: 0.92 (ref 0.87–1.13)
MCH RBC QN AUTO: 27.7 PG (ref 27–33)
MCHC RBC AUTO-ENTMCNC: 32.9 G/DL (ref 32.3–36.5)
MCV RBC AUTO: 84.2 FL (ref 81.4–97.8)
PHOSPHATE SERPL-MCNC: 3.8 MG/DL (ref 2.5–4.5)
PLATELET # BLD AUTO: 208 K/UL (ref 164–446)
PMV BLD AUTO: 8.9 FL (ref 9–12.9)
POTASSIUM SERPL-SCNC: 4.5 MMOL/L (ref 3.6–5.5)
PROTHROMBIN TIME: 12.5 SEC (ref 12–14.6)
RBC # BLD AUTO: 4.12 M/UL (ref 4.7–6.1)
SODIUM SERPL-SCNC: 137 MMOL/L (ref 135–145)
TSH SERPL DL<=0.005 MIU/L-ACNC: 1.58 UIU/ML (ref 0.38–5.33)
VIT B12 SERPL-MCNC: 660 PG/ML (ref 211–911)
WBC # BLD AUTO: 4.5 K/UL (ref 4.8–10.8)

## 2024-05-31 PROCEDURE — A9270 NON-COVERED ITEM OR SERVICE: HCPCS | Performed by: HOSPITALIST

## 2024-05-31 PROCEDURE — 700102 HCHG RX REV CODE 250 W/ 637 OVERRIDE(OP): Performed by: INTERNAL MEDICINE

## 2024-05-31 PROCEDURE — 80069 RENAL FUNCTION PANEL: CPT

## 2024-05-31 PROCEDURE — A9270 NON-COVERED ITEM OR SERVICE: HCPCS | Performed by: INTERNAL MEDICINE

## 2024-05-31 PROCEDURE — 770006 HCHG ROOM/CARE - MED/SURG/GYN SEMI*

## 2024-05-31 PROCEDURE — 99222 1ST HOSP IP/OBS MODERATE 55: CPT | Performed by: STUDENT IN AN ORGANIZED HEALTH CARE EDUCATION/TRAINING PROGRAM

## 2024-05-31 PROCEDURE — 85730 THROMBOPLASTIN TIME PARTIAL: CPT

## 2024-05-31 PROCEDURE — 99232 SBSQ HOSP IP/OBS MODERATE 35: CPT | Performed by: INTERNAL MEDICINE

## 2024-05-31 PROCEDURE — 82962 GLUCOSE BLOOD TEST: CPT

## 2024-05-31 PROCEDURE — 82607 VITAMIN B-12: CPT

## 2024-05-31 PROCEDURE — 36415 COLL VENOUS BLD VENIPUNCTURE: CPT

## 2024-05-31 PROCEDURE — 700101 HCHG RX REV CODE 250: Performed by: HOSPITALIST

## 2024-05-31 PROCEDURE — 85027 COMPLETE CBC AUTOMATED: CPT

## 2024-05-31 PROCEDURE — 82306 VITAMIN D 25 HYDROXY: CPT

## 2024-05-31 PROCEDURE — 84443 ASSAY THYROID STIM HORMONE: CPT

## 2024-05-31 PROCEDURE — 72100 X-RAY EXAM L-S SPINE 2/3 VWS: CPT

## 2024-05-31 PROCEDURE — 700111 HCHG RX REV CODE 636 W/ 250 OVERRIDE (IP): Performed by: HOSPITALIST

## 2024-05-31 PROCEDURE — 700102 HCHG RX REV CODE 250 W/ 637 OVERRIDE(OP): Performed by: HOSPITALIST

## 2024-05-31 PROCEDURE — 85610 PROTHROMBIN TIME: CPT

## 2024-05-31 RX ORDER — VENLAFAXINE HYDROCHLORIDE 75 MG/1
75 CAPSULE, EXTENDED RELEASE ORAL
Status: ACTIVE | OUTPATIENT
Start: 2024-06-01

## 2024-05-31 RX ORDER — UREA 10 %
1000 LOTION (ML) TOPICAL DAILY
Status: DISPENSED | OUTPATIENT
Start: 2024-05-31

## 2024-05-31 RX ORDER — VITAMIN B COMPLEX
1000 TABLET ORAL DAILY
Status: DISPENSED | OUTPATIENT
Start: 2024-05-31

## 2024-05-31 RX ADMIN — INSULIN HUMAN 3 UNITS: 100 INJECTION, SOLUTION PARENTERAL at 20:40

## 2024-05-31 RX ADMIN — INSULIN HUMAN 2 UNITS: 100 INJECTION, SOLUTION PARENTERAL at 13:34

## 2024-05-31 RX ADMIN — INSULIN HUMAN 3 UNITS: 100 INJECTION, SOLUTION PARENTERAL at 08:07

## 2024-05-31 RX ADMIN — ASPIRIN 81 MG: 81 TABLET, COATED ORAL at 17:10

## 2024-05-31 RX ADMIN — DOXYCYCLINE 100 MG: 100 TABLET, FILM COATED ORAL at 04:31

## 2024-05-31 RX ADMIN — DOXYCYCLINE 100 MG: 100 TABLET, FILM COATED ORAL at 17:10

## 2024-05-31 RX ADMIN — OMEPRAZOLE 20 MG: 20 CAPSULE, DELAYED RELEASE ORAL at 04:31

## 2024-05-31 RX ADMIN — OXYCODONE HYDROCHLORIDE 5 MG: 5 TABLET ORAL at 19:39

## 2024-05-31 RX ADMIN — OMEPRAZOLE 20 MG: 20 CAPSULE, DELAYED RELEASE ORAL at 17:10

## 2024-05-31 RX ADMIN — CYANOCOBALAMIN TAB 500 MCG 1000 MCG: 500 TAB at 12:44

## 2024-05-31 RX ADMIN — Medication 1000 UNITS: at 12:45

## 2024-05-31 RX ADMIN — OXYCODONE HYDROCHLORIDE 5 MG: 5 TABLET ORAL at 02:01

## 2024-05-31 RX ADMIN — OXYCODONE HYDROCHLORIDE 5 MG: 5 TABLET ORAL at 08:36

## 2024-05-31 RX ADMIN — CEFTRIAXONE SODIUM 2000 MG: 10 INJECTION, POWDER, FOR SOLUTION INTRAVENOUS at 17:11

## 2024-05-31 ASSESSMENT — ENCOUNTER SYMPTOMS
DIZZINESS: 0
ABDOMINAL PAIN: 0
FALLS: 0
INSOMNIA: 0
BACK PAIN: 1
DEPRESSION: 1
WEAKNESS: 0
SEIZURES: 0
PALPITATIONS: 0
HEADACHES: 0
NERVOUS/ANXIOUS: 0
NAUSEA: 0
COUGH: 0
FEVER: 0
WHEEZING: 0
VOMITING: 0
FOCAL WEAKNESS: 0
EYE PAIN: 0
CONSTIPATION: 0
MYALGIAS: 1
BLOOD IN STOOL: 0
SHORTNESS OF BREATH: 0
EYE REDNESS: 0
CHILLS: 0
LOSS OF CONSCIOUSNESS: 0
TREMORS: 0
HEMOPTYSIS: 0
DIARRHEA: 0

## 2024-05-31 ASSESSMENT — PAIN DESCRIPTION - PAIN TYPE
TYPE: CHRONIC PAIN
TYPE: ACUTE PAIN

## 2024-05-31 NOTE — PROGRESS NOTES
Neurosurgery Progress Note    Subjective:  57 yo man presented with a fall about one week ago with resultant increased right foot weakness. MRI shows critical L4-5 spondylosis and stenosis. Has a history of a prior L5-S1 onlay fusion 30 years ago.     Reporting back pain and right greater than left lower extremity radiculopathy.   Right foot weakness requiring AFO brace.     Dynamic lumbar x-rays pending.  Cervical MRI pending as he was also found to have interossei wasting.     Currently being treated for pneumonia and uncontrolled diabetes.       Exam:  Bilateral interossei wasting, 4-/5 bilateral interossei.  Motor right EHL/AT 4/5, limited due to flat foot on the left with 4/5, othwerise 5/5 in bilateral lower extremities.   Sensation intact except distally in the lower extremities    BP  Min: 123/75  Max: 155/91  Pulse  Av  Min: 75  Max: 96  Resp  Av.5  Min: 16  Max: 18  Temp  Av.3 °C (97.4 °F)  Min: 36.1 °C (96.9 °F)  Max: 36.5 °C (97.7 °F)  SpO2  Av.3 %  Min: 92 %  Max: 94 %    No data recorded    Recent Labs     24  0147 24  0617   WBC 4.9 4.2* 4.5*   RBC 4.05* 3.97* 4.12*   HEMOGLOBIN 11.5* 11.4* 11.4*   HEMATOCRIT 34.0* 33.0* 34.7*   MCV 84.0 83.1 84.2   MCH 28.4 28.7 27.7   MCHC 33.8 34.5 32.9   RDW 41.2 39.8 40.8   PLATELETCT 196 219 208   MPV 9.1 8.9* 8.9*     Recent Labs     24  0147 24  0617   SODIUM 139 142 137   POTASSIUM 3.9 4.0 4.5   CHLORIDE 103 105 102   CO2 27 26 27   GLUCOSE 113* 79 220*   BUN 23* 18 18   CREATININE 0.53 0.49* 0.49*   CALCIUM 8.4* 8.4* 8.3*     Recent Labs     24  0617   APTT 29.6   INR 0.92           Intake/Output                         24 0700 - 05/31/24 0659 24 -  Total  Total                 Intake    P.O.  --  360 360  --  -- --    P.O. -- 360 360 -- -- --    Total Intake -- 360 360 -- -- --       Output    Urine  400   1000 1400  475  -- 475    Number of Times Voided 3 x -- 3 x 2 x -- 2 x    Urine Void (mL) 400 1000 1400 475 -- 475    Total Output 400 1000 1400 475 -- 475       Net I/O     -400 -640 -1040 -475 -- -475              Intake/Output Summary (Last 24 hours) at 5/31/2024 0826  Last data filed at 5/31/2024 0716  Gross per 24 hour   Intake 360 ml   Output 1875 ml   Net -1515 ml             melatonin  5 mg HS PRN    acetaminophen  650 mg Q6HRS PRN    oxyCODONE immediate-release  5 mg Q4HRS PRN    ondansetron  4 mg Q4HRS PRN    ondansetron  4 mg Q4HRS PRN    promethazine  12.5-25 mg Q4HRS PRN    promethazine  12.5-25 mg Q4HRS PRN    prochlorperazine  5-10 mg Q4HRS PRN    insulin regular  2-9 Units 4X/DAY ACHS    And    dextrose bolus  25 g Q15 MIN PRN    aspirin  81 mg Q EVENING    metoclopramide  10 mg TID PRN    omeprazole  20 mg BID    cefTRIAXone (ROCEPHIN) IV  2,000 mg Q EVENING    doxycycline monohydrate  100 mg Q12HRS    insulin GLARGINE  25 Units BID    NS   Continuous       Assessment and Plan:  Hospital day # 5  Flexion/extension x-rays pending.  Cervical MRI pending.   Discussed plan of care with Dr. Pulido who is recommendinga  redo L5 and L4 laminectomy with possible stealth fusion 6/4/24 (Tuesday).  Will appreciate medical risk stratification/medical clearance for a two hour prone procedure Tuesday.   Following    Chemical prophylactic DVT therapy: Yes - Lovenox 40mg/qd    Start date/time: okay from NS perspective until surgery 6/4     Brain Compression: No

## 2024-05-31 NOTE — PROGRESS NOTES
Hypoglycemia Intervention    Hypoglycemia protocol intervention:  Blood glucose 50 at 0118.  Intervention: 8 oz of fruit juice   Repeat blood glucose 58 at 0136.  Intervention: 8 oz of fruit juice   Repeat blood glucose 73 at 0151  Intervention:   Additional interventions needed: Carb snack & blood glucose recheck in 1 hour  Hosp notified of the above at 0154.

## 2024-05-31 NOTE — THERAPY
"Physical Therapy Contact Note    Patient Name: Christoph Taylor  Age:  58 y.o., Sex:  male  Medical Record #: [id number]  Today's Date: 5/31/2024 05/31/24 4056   Interdisciplinary Plan of Care Collaboration   Collaboration Comments Per chart review pt now pending surgical intervention on 6/4 for \"severe canal stenosis at L4-L5 with cauda equina compression\" per MRI.  Pt not medically stable for mobility at this time, will HOLD and follow up post-op as able/appropriate.       " pt A&Ox1

## 2024-05-31 NOTE — CONSULTS
"PSYCHIATRIC INTAKE EVALUATION(new)  Reason for admission: Back pain and Suicidal Ideation  Reason for consult: \"Psychiatric medication evaluation/management--Per Dr. Tripathi\"   Requesting Provider: Dr. Werner Acuna       Legal Hold Status: Not on legal hold           Chart reviewed.         HPI: Christoph Taylor is a 58 y.o. male admitted for back pain who reported thoughts of SI to the ED triage nurse. Psychiatry was consulted following evaluation by behavioral health Dr. Tripathi.     Christoph reports he has been feeling down over the past couple months as a result of increasing health problems and a straining of his relationship with his sons. He reports difficulty falling asleep due to back pain and racing thoughts and waking up frequently in the night due to pain resulting in 3-4 hours of sleep a night. He also reports decreased interest in his normal activities of cooking and taking drives, low energy, poor concentration, poor appetite with reported weight loss, and guilt and negative feelings over his personal relationship. He felt that he had thoughts of self harm over the memorial day weekend because he was discharged from a prior hospital visit and was unable to care for himself. He felt that no one was there to help him. He denies any plan of how he would kill himself. He had previously given the only firearm in the house to one of his sons as he feared he would hurt himself.  He reports that although he gets frustrated and sometimes thinks life is not worth living he still retains hope he can improve and would like to improve physically and mentally.    Christoph has had less mobility over the past couple months due to lumbar spondylosis and stenosis. He now relies on a wheelchair for mobility and says it is difficult to move around his house and perform activities of daily living. He reports he has been unable to do many of the activities that he enjoys due to this limited mobility, but also lacks interest in doing " "them even if he was mobile. Prior to this most recent hospital admission, he was never formally assessed for the extent of his back pain and felt that medical professionals assumed he was drug seeking. Per chart review, he was offered a referral for neurosurgery back in 2022 but no consult notes were found. He reports feeling better now that he believes people know about the reason for his back pain and that he needs surgery. He also reports feeling judged by medical professionals in general for his diabetes management and reports that \"being diabetic is being hated.\"         In addition to his medical difficulties, Christoph's mood has been impacted by a worsening relationship with his three adult sons who live in [place]. He reports they haven't been answering his calls or texts for the past couple months and \"sending him straight to [facility].\" He reports he does not know why the relationship has become strained. He reports he does receive support from his sister who acts as an intermediary between him an his sons. He later reported he is in contact with 2/3 of his sons and that he has a good relationship with these 2 sons but not with the third.    Psychiatric ROS:  Depression: as above, reports feeling better today since he received a diagnosis for his back pain, denies any current SI/HI  Alejandra: denies episodes of talkativeness, distractibility, and impulsivity  Anxiety: reports racing thoughts at night when he is trying to fall asleep and worry about financial concerns and things he needs to do  Psychosis: denies AVH, delusions, paranoia  PTSD: Denies traumatic events that he thinks about frequently or that cause nightmares    PHQ 9 on 5/28/2024  Little interest or pleasure in doing things- 2  Feeling down, depressed, or hopeless- 2  Trouble falling asleep, or sleeping too much-4  Feeling tired or having little energy- 2  Poor appetite or overeating- 2  Feeling bad about yourself- 2  Trouble concentrating on " "things- 2  Moving or speaking so slowly that other people could have noticed- 0  Thoughts that you would be better off dead, or hurting yourself- 1  Total score- 15    Interpretation of PHQ-9 Total Score   Score Severity   1-4 No Depression   5-9 Mild Depression   10-14 Moderate Depression   15-19 Moderately Severe Depression   20-27 Severe Depression     Medical ROS:   Reports back pain    *Psychiatric Examination:  Vitals:   Vitals:    05/31/24 1535   BP: 98/55   Pulse: 75   Resp: 18   Temp: 36.6 °C (97.9 °F)   SpO2: 91%       General Appearance: Appropriately dressed in hospital attire, resting comfortably in bed, appears stated age  Behavior: Appears calm, makes appropriate eye contact, does not appear anxious or tense   neuro : no tics or tremors , no dystonias.  No dyskinesias   Speech: normal rate and rhythm, normal volume, no pressured speech or latency or poverty of speech  Thought processes: linear and logical  Abnormal or Psychotic Thoughts: Denies AVH, delusions  Insight: Good insight, asked his son to take away his firearm, acknowledges he has been feeling down  Judgement: Good judgement, asked to be brought back to the hospital when he could not care for himself, is cooperative with and open to psychiatric treatment and management  Orientation: oriented to person, place, and time  Recent and Remote Memory: recalls 1/3 objects without prompting, 3/3 objects when multiple choice. HE has good recollection of reasons for hospitalization and treatments provided.  Attention Span and Concentration: appropriately spelled World forwards and backwards, intact to conversation  Language: fluent in english  Mood:\"Better today now that they told me what is wrong with my back\"  Affect: mood congruent, full range  SI/HI: Denies SI or HI  currently, says he lives for \"my sons of course\", other than that he says \"I don't know\" and later \"my dog\", would like to improve his mobility and drive in a nice car again    Past " Medical History:   Past Medical History:   Diagnosis Date    Abnormal electrocardiogram (ECG) (EKG) 11/8/2021    KRISTAL (acute kidney injury) ([facility]) 11/8/2021    Chest pain in adult 11/8/2021    CKD (chronic kidney disease) stage 3, GFR 30-59 ml/min 11/7/2021    Diabetes ([facility])     Diabetic ketoacidosis without coma associated with type 2 diabetes mellitus ([facility]) 11/7/2021    Diarrhea 3/18/2022    DKA, type 1, not at goal ([facility]) 7/28/2022    Esophagitis 7/28/2022    [place] (hard of hearing[place]     Very [place] No hearing aides    Hypercholesteremia         Past Psychiatric History:  Previous Diagnosis:denies  Current meds: denies  Previous med trials: denies  Hospitalizations: denies  Suicide attempts/SIB: denies  Outpatient services: denies  Access to guns: denies  Abuse/trauma hx: denies    Family Hx:   Psych: Alcohol abuse in mother  Medical: denies hx of seizures, thyroid problems, cardiac death at young age    Social Hx:   Marital status: 2 previous marriages,  twice  Children: three adult sons, strained relationship  Living situation: Lives alone, one pet dog--- border collie named Carlos  Support system: Has a good relationship with his sister, who doesn't live in town, his neighbors watch after his dog, is now talking with 2/3 of his sons    Substances:  Drugs: denies  Alcohol:  denies current use or history of abuse  Nicotine:   denies    Current Medications:  Current Facility-Administered Medications   Medication Dose Route Frequency Provider Last Rate Last Admin    melatonin tablet 5 mg  5 mg Oral HS PRN NATALEE BerriosP.R.NOlga        acetaminophen (Tylenol) tablet 650 mg  650 mg Oral Q6HRS PRN Lorena De La Cruz M.D.        oxyCODONE immediate-release (Roxicodone) tablet 5 mg  5 mg Oral Q4HRS PRGENOVEVA De La Cruz M.D.   5 mg at 05/31/24 0836    ondansetron (Zofran) syringe/vial injection 4 mg  4 mg Intravenous Q4HRS PRGENOVEVA De La Cruz M.D.        ondansetron (Zofran ODT) dispertab 4 mg  4 mg Oral Q4HRS PRGENOVEVA Carrillo  DILIP De La Cruz        promethazine (Phenergan) tablet 12.5-25 mg  12.5-25 mg Oral Q4HRS PRN Lorena De La Cruz M.D.        promethazine (Phenergan) suppository 12.5-25 mg  12.5-25 mg Rectal Q4HRS PRN Lorena De La Cruz M.D.        prochlorperazine (Compazine) injection 5-10 mg  5-10 mg Intravenous Q4HRS PRN Lorena De La Cruz M.D.        insulin regular (HumuLIN R,NovoLIN R) injection  2-9 Units Subcutaneous 4X/DAY ACHS Lorena De La Cruz M.D.   3 Units at 05/31/24 0807    And    dextrose 50% (D50W) injection 25 g  25 g Intravenous Q15 MIN PRN Lorena De La Cruz M.D.        aspirin EC tablet 81 mg  81 mg Oral Q EVENING Lorena De La Cruz M.D.   81 mg at 05/30/24 1716    metoclopramide (Reglan) tablet 10 mg  10 mg Oral TID PRN Lorena De La Cruz M.D.        omeprazole (PriLOSEC) capsule 20 mg  20 mg Oral BID Lorena De La Cruz M.D.   20 mg at 05/31/24 0431    cefTRIAXone (Rocephin) syringe 2,000 mg  2,000 mg Intravenous Q EVENING Lorena De La Cruz M.D.   2,000 mg at 05/30/24 1716    doxycycline monohydrate (Adoxa) tablet 100 mg  100 mg Oral Q12HRS Lorena De La Cruz M.D.   100 mg at 05/31/24 0431    insulin GLARGINE (Lantus,Semglee) injection  25 Units Subcutaneous BID Werner Acuna M.D.   25 Units at 05/31/24 0807    NS infusion   Intravenous Continuous Lorena De La Cruz M.D. 125 mL/hr at 05/29/24 1327 New Bag at 05/29/24 1327        Allergies:  Patient has no known allergies.       Labs personally reviewed:   Recent Results (from the past 72 hour(s))   POCT glucose device results    Collection Time: 05/28/24  1:08 PM   Result Value Ref Range    POC Glucose, Blood 286 (H) 65 - 99 mg/dL   POCT glucose device results    Collection Time: 05/28/24  5:44 PM   Result Value Ref Range    POC Glucose, Blood 208 (H) 65 - 99 mg/dL   POCT glucose device results    Collection Time: 05/28/24  9:54 PM   Result Value Ref Range    POC Glucose, Blood 101 (H) 65 - 99 mg/dL   CBC with Differential    Collection Time: 05/29/24  3:15 AM   Result Value Ref Range    WBC 4.9 4.8 - 10.8 K/uL     RBC 4.05 (L) 4.70 - 6.10 M/uL    Hemoglobin 11.5 (L) 14.0 - 18.0 g/dL    Hematocrit 34.0 (L) 42.0 - 52.0 %    MCV 84.0 81.4 - 97.8 fL    MCH 28.4 27.0 - 33.0 pg    MCHC 33.8 32.3 - 36.5 g/dL    RDW 41.2 35.9 - 50.0 fL    Platelet Count 196 164 - 446 K/uL    MPV 9.1 9.0 - 12.9 fL    Neutrophils-Polys 60.80 44.00 - 72.00 %    Lymphocytes 25.60 22.00 - 41.00 %    Monocytes 7.80 0.00 - 13.40 %    Eosinophils 5.40 0.00 - 6.90 %    Basophils 0.00 0.00 - 1.80 %    Immature Granulocytes 0.40 0.00 - 0.90 %    Nucleated RBC 0.00 0.00 - 0.20 /100 WBC    Neutrophils (Absolute) 2.95 1.82 - 7.42 K/uL    Lymphs (Absolute) 1.24 1.00 - 4.80 K/uL    Monos (Absolute) 0.38 0.00 - 0.85 K/uL    Eos (Absolute) 0.26 0.00 - 0.51 K/uL    Baso (Absolute) 0.00 0.00 - 0.12 K/uL    Immature Granulocytes (abs) 0.02 0.00 - 0.11 K/uL    NRBC (Absolute) 0.00 K/uL   Comp Metabolic Panel (CMP)    Collection Time: 05/29/24  3:15 AM   Result Value Ref Range    Sodium 139 135 - 145 mmol/L    Potassium 3.9 3.6 - 5.5 mmol/L    Chloride 103 96 - 112 mmol/L    Co2 27 20 - 33 mmol/L    Anion Gap 9.0 7.0 - 16.0    Glucose 113 (H) 65 - 99 mg/dL    Bun 23 (H) 8 - 22 mg/dL    Creatinine 0.53 0.50 - 1.40 mg/dL    Calcium 8.4 (L) 8.5 - 10.5 mg/dL    Correct Calcium 9.2 8.5 - 10.5 mg/dL    AST(SGOT) 10 (L) 12 - 45 U/L    ALT(SGPT) 15 2 - 50 U/L    Alkaline Phosphatase 66 30 - 99 U/L    Total Bilirubin 0.2 0.1 - 1.5 mg/dL    Albumin 3.0 (L) 3.2 - 4.9 g/dL    Total Protein 5.6 (L) 6.0 - 8.2 g/dL    Globulin 2.6 1.9 - 3.5 g/dL    A-G Ratio 1.2 g/dL   ESTIMATED GFR    Collection Time: 05/29/24  3:15 AM   Result Value Ref Range    GFR (CKD-EPI) 115 >60 mL/min/1.73 m 2   POCT glucose device results    Collection Time: 05/29/24  7:51 AM   Result Value Ref Range    POC Glucose, Blood 118 (H) 65 - 99 mg/dL   POCT glucose device results    Collection Time: 05/29/24 12:40 PM   Result Value Ref Range    POC Glucose, Blood 95 65 - 99 mg/dL   POCT glucose device results     Collection Time: 05/29/24  9:40 PM   Result Value Ref Range    POC Glucose, Blood 224 (H) 65 - 99 mg/dL   CBC WITHOUT DIFFERENTIAL    Collection Time: 05/30/24  1:47 AM   Result Value Ref Range    WBC 4.2 (L) 4.8 - 10.8 K/uL    RBC 3.97 (L) 4.70 - 6.10 M/uL    Hemoglobin 11.4 (L) 14.0 - 18.0 g/dL    Hematocrit 33.0 (L) 42.0 - 52.0 %    MCV 83.1 81.4 - 97.8 fL    MCH 28.7 27.0 - 33.0 pg    MCHC 34.5 32.3 - 36.5 g/dL    RDW 39.8 35.9 - 50.0 fL    Platelet Count 219 164 - 446 K/uL    MPV 8.9 (L) 9.0 - 12.9 fL   Renal Function Panel    Collection Time: 05/30/24  1:47 AM   Result Value Ref Range    Sodium 142 135 - 145 mmol/L    Potassium 4.0 3.6 - 5.5 mmol/L    Chloride 105 96 - 112 mmol/L    Co2 26 20 - 33 mmol/L    Glucose 79 65 - 99 mg/dL    Creatinine 0.49 (L) 0.50 - 1.40 mg/dL    Bun 18 8 - 22 mg/dL    Calcium 8.4 (L) 8.5 - 10.5 mg/dL    Correct Calcium 9.1 8.5 - 10.5 mg/dL    Phosphorus 3.6 2.5 - 4.5 mg/dL    Albumin 3.1 (L) 3.2 - 4.9 g/dL   ESTIMATED GFR    Collection Time: 05/30/24  1:47 AM   Result Value Ref Range    GFR (CKD-EPI) 117 >60 mL/min/1.73 m 2   POCT glucose device results    Collection Time: 05/30/24  7:52 AM   Result Value Ref Range    POC Glucose, Blood 115 (H) 65 - 99 mg/dL   POCT glucose device results    Collection Time: 05/30/24  9:14 AM   Result Value Ref Range    POC Glucose, Blood 122 (H) 65 - 99 mg/dL   POCT glucose device results    Collection Time: 05/30/24  1:25 PM   Result Value Ref Range    POC Glucose, Blood 115 (H) 65 - 99 mg/dL   POCT glucose device results    Collection Time: 05/30/24  5:14 PM   Result Value Ref Range    POC Glucose, Blood 180 (H) 65 - 99 mg/dL   POCT glucose device results    Collection Time: 05/30/24  8:25 PM   Result Value Ref Range    POC Glucose, Blood 86 65 - 99 mg/dL   POCT glucose device results    Collection Time: 05/31/24  1:18 AM   Result Value Ref Range    POC Glucose, Blood 50 (L) 65 - 99 mg/dL   POCT glucose device results    Collection Time:  05/31/24  1:37 AM   Result Value Ref Range    POC Glucose, Blood 58 (L) 65 - 99 mg/dL   POCT glucose device results    Collection Time: 05/31/24  1:50 AM   Result Value Ref Range    POC Glucose, Blood 73 65 - 99 mg/dL   POCT glucose device results    Collection Time: 05/31/24  3:12 AM   Result Value Ref Range    POC Glucose, Blood 181 (H) 65 - 99 mg/dL   Prothrombin Time    Collection Time: 05/31/24  6:17 AM   Result Value Ref Range    PT 12.5 12.0 - 14.6 sec    INR 0.92 0.87 - 1.13   APTT    Collection Time: 05/31/24  6:17 AM   Result Value Ref Range    APTT 29.6 24.7 - 36.0 sec   CBC WITHOUT DIFFERENTIAL    Collection Time: 05/31/24  6:17 AM   Result Value Ref Range    WBC 4.5 (L) 4.8 - 10.8 K/uL    RBC 4.12 (L) 4.70 - 6.10 M/uL    Hemoglobin 11.4 (L) 14.0 - 18.0 g/dL    Hematocrit 34.7 (L) 42.0 - 52.0 %    MCV 84.2 81.4 - 97.8 fL    MCH 27.7 27.0 - 33.0 pg    MCHC 32.9 32.3 - 36.5 g/dL    RDW 40.8 35.9 - 50.0 fL    Platelet Count 208 164 - 446 K/uL    MPV 8.9 (L) 9.0 - 12.9 fL   Renal Function Panel    Collection Time: 05/31/24  6:17 AM   Result Value Ref Range    Sodium 137 135 - 145 mmol/L    Potassium 4.5 3.6 - 5.5 mmol/L    Chloride 102 96 - 112 mmol/L    Co2 27 20 - 33 mmol/L    Glucose 220 (H) 65 - 99 mg/dL    Creatinine 0.49 (L) 0.50 - 1.40 mg/dL    Bun 18 8 - 22 mg/dL    Calcium 8.3 (L) 8.5 - 10.5 mg/dL    Correct Calcium 9.0 8.5 - 10.5 mg/dL    Phosphorus 3.8 2.5 - 4.5 mg/dL    Albumin 3.1 (L) 3.2 - 4.9 g/dL   ESTIMATED GFR    Collection Time: 05/31/24  6:17 AM   Result Value Ref Range    GFR (CKD-EPI) 117 >60 mL/min/1.73 m 2   POCT glucose device results    Collection Time: 05/31/24  8:01 AM   Result Value Ref Range    POC Glucose, Blood 234 (H) 65 - 99 mg/dL           EKG:     Results for orders placed or performed during the hospital encounter of 05/27/24   EKG   Result Value Ref Range    Report       [facility] Emergency Dept.    Test Date:  2024-05-27  Pt Name:    BAILEE JACOBSON                Department: ER  MRN:        [id number]                      Room:       BL 21  Gender:     Male                         Technician: [id number]  :        1965                   Requested By:ER TRIAGE PROTOCOL  Order #:    [id number]                    Reading MD: Jaxson Gracia MD    Measurements  Intervals                                Axis  Rate:       66                           P:          39  MA:         172                          QRS:        44  QRSD:       91                           T:          52  QT:         399  QTc:        418    Interpretive Statements  Sinus rhythm, rate of 66, normal intervals and axis, no acute ST elevations  or ischemic changes.  Improved from prior dated 24  Electronically Signed On 2024 20:24:33 PDT by Jaxson Gracia MD       Assessment: Christoph Taylor is a 58 y.o. male admitted for back pain who reported thoughts of SI to the ED triage nurse. Psychiatry was consulted following evaluation by behavioral health Dr. Tripathi.     He reports a depressed mood over the past couple months due to social, medical, and financial stressors. He reports poor sleep, loss of interest, feelings of guilt, low energy, poor concentration, poor appetite, and SI supporting a diagnosis of Major Depressive Disorder. He has a hard time trying to get to outpatient appointments due to his limited mobility and inability to drive, but is open to starting medications and seeing a therapist. He reports it was helpful to see Dr. Tripathi the other day to talk. He would likely benefit from starting an SNRI and continued inpatient therapy.     Dx:  Major Depressive Disorder, recurrent, moderate    Medical:  As managed by medical team      Plan:  Legal hold: none  Psychotropic medications:  START Venlafaxine XR 75 mg q.d. for treatment of depression  Labs: Recommend collecting a TSH/Free T4, Vitamin D, Vitamin B12 level for workup of depression  Psychiatry will follow up    Thank you for the  consult.

## 2024-05-31 NOTE — PROGRESS NOTES
Hospital Medicine Daily Progress Note    Date of Service  5/30/2024    Chief Complaint  Christoph Taylor is a 58 y.o. male admitted 5/27/2024 with Back Pain and Suicidal Ideation        Hospital Course  No notes on file    Interval Problem Update  I reviewed the chart along with vitals, labs, imaging, test (both pending and resulted) and recommendations from specialists and interdisciplinary team.  Managing pneumonia and uncontrolled diabetes  BG 300s initially now improved wityh increased Lantus  Bld cx pending.  Known incontinence and diaper use for months and drop foot with back pain. sees [facility] MRI came back showing sveere lumbar stenosis with cauda equina  R foot XR reviewed chronic degenerative changes and flat foot, he can follow outpatient Orthopedics. Ordered AFO     Patient has complained mainly about foot, denies Charcot, as well as months of fecal incontinence and back pain. Explained diabetes and CT results to him. Patient otherwise stable. More improtantly I discussed with LULA Shi. Patient was seen at [facility] last year. He has had fott drop and incontinence for months. After discussion, he has agreed to laminectomy and decompression planned for next week. Standing XRs ordered however nursing stated he cannot stand straight or long enough to be straight    Patient has been irritable. Discussed with Psychiatry. Adjusting antidepressants. Updated and explained plan to him.   PT recommends SNF    Patient is has a high medical complexity, complex decision making and is at high risk for complication, morbidity, and mortality, thus requiring the highest level of my preparedness for sudden, emergent intervention. Medical decision making is therefore complex. I provided  services, which included ordering labs and/or imaging, and discussing the case with various consultants.medication orders, frequent reevaluations of the patient's condition and response to treatment. Time was also devoted  to counseling and coordinating care including review of records, pertinent lab data and studies, as well as discussing diagnostic evaluation and work up, planned therapeutic interventions and future disposition of care. Where indicated, the assessment and plan reflect discussion of patient with consultants, other healthcare providers, family members, and additional research needed to obtain further information in formulating the plan of care for Christoph Taylor. Total time spent was 70 minutes.       I have discussed this patient's plan of care and discharge plan at IDT rounds today with Case Management, Nursing, Nursing leadership, and other members of the IDT team.    Consultants/Specialty      Code Status  Full Code    Disposition  Medically Cleared  I have placed the appropriate orders for post-discharge needs.    Review of Systems  Review of Systems   Constitutional:  Negative for chills and fever.   HENT:  Negative for congestion, hearing loss and nosebleeds.    Eyes:  Negative for pain and redness.   Respiratory:  Negative for cough, hemoptysis, shortness of breath and wheezing.    Cardiovascular:  Negative for chest pain and palpitations.   Gastrointestinal:  Negative for abdominal pain, blood in stool, constipation, diarrhea, nausea and vomiting.   Genitourinary:  Negative for dysuria, frequency and hematuria.   Musculoskeletal:  Positive for back pain, joint pain and myalgias. Negative for falls.   Skin:  Negative for rash.   Neurological:  Negative for dizziness, tremors, focal weakness, seizures, loss of consciousness, weakness and headaches.   Psychiatric/Behavioral:  Positive for depression. The patient is not nervous/anxious and does not have insomnia.    All other systems reviewed and are negative.       Physical Exam  Temp:  [36.3 °C (97.4 °F)-36.4 °C (97.6 °F)] 36.3 °C (97.4 °F)  Pulse:  [73-78] 75  Resp:  [17-18] 18  BP: (119-142)/(72-75) 123/75  SpO2:  [94 %-97 %] 94 %    Physical  Exam  Vitals and nursing note reviewed.   HENT:      Head: Normocephalic and atraumatic.      Right Ear: External ear normal.      Left Ear: External ear normal.      Nose: Nose normal.      Mouth/Throat:      Mouth: Mucous membranes are moist.   Eyes:      General: No scleral icterus.     Conjunctiva/sclera: Conjunctivae normal.   Cardiovascular:      Rate and Rhythm: Normal rate and regular rhythm.      Heart sounds: No murmur heard.     No friction rub. No gallop.   Pulmonary:      Effort: Pulmonary effort is normal.      Breath sounds: Normal breath sounds.   Abdominal:      General: Abdomen is flat. Bowel sounds are normal. There is no distension.      Palpations: Abdomen is soft.      Tenderness: There is no abdominal tenderness. There is no guarding.   Musculoskeletal:         General: Tenderness (myalgia,back pain) present. Normal range of motion.      Cervical back: Normal range of motion and neck supple.   Skin:     General: Skin is warm.   Neurological:      Mental Status: He is alert and oriented to person, place, and time. Mental status is at baseline.      Motor: Weakness present.   Psychiatric:         Mood and Affect: Mood normal.         Behavior: Behavior normal.         Thought Content: Thought content normal.         Judgment: Judgment normal.         Fluids    Intake/Output Summary (Last 24 hours) at 5/30/2024 1812  Last data filed at 5/30/2024 1353  Gross per 24 hour   Intake 1160 ml   Output 1200 ml   Net -40 ml       Laboratory  Recent Labs     05/27/24  1950 05/29/24 0315 05/30/24 0147   WBC 5.0 4.9 4.2*   RBC 3.83* 4.05* 3.97*   HEMOGLOBIN 10.6* 11.5* 11.4*   HEMATOCRIT 32.1* 34.0* 33.0*   MCV 83.8 84.0 83.1   MCH 27.7 28.4 28.7   MCHC 33.0 33.8 34.5   RDW 39.8 41.2 39.8   PLATELETCT 190 196 219   MPV 9.3 9.1 8.9*     Recent Labs     05/27/24  1950 05/29/24 0315 05/30/24 0147   SODIUM 132* 139 142   POTASSIUM 4.7 3.9 4.0   CHLORIDE 94* 103 105   CO2 25 27 26   GLUCOSE 582* 113* 79   BUN  28* 23* 18   CREATININE 0.74 0.53 0.49*   CALCIUM 8.3* 8.4* 8.4*                   Imaging  MR-THORACIC SPINE-WITH & W/O   Final Result         Remote endplate compression fractures at T1, T2, T12 without significant posterior cortical retropulsion or canal stenosis.      Mild degenerative changes of the thoracic spine without significant canal stenosis or foraminal narrowing.         MR-LUMBAR SPINE-WITH & W/O   Final Result         Severe canal stenosis at L4-5 with cauda equina compression. There is also severe right foraminal narrowing.      Postoperative changes at L5-S1 with a well decompressed canal.      Remote compression fracture involving the superior endplate of T12 with 50% loss of height.      DX-FOOT-2- LEFT   Final Result      No acute osseous abnormality.      Prominent degenerative changes within the mid and hindfoot with pes planus type deformity.      CT-LSPINE W/O PLUS RECONS   Final Result         1.  No acute traumatic bony injury of the lumbar spine.      CT-TSPINE W/O PLUS RECONS   Final Result         1.  No acute traumatic bony injury of the thoracic spine.      CT-CHEST,ABDOMEN,PELVIS WITH   Final Result      DX-LUMBAR SPINE-2 OR 3 VIEWS    (Results Pending)   MR-CERVICAL SPINE-W/O    (Results Pending)        Assessment/Plan  * Pneumonia due to infectious organism- (present on admission)  Assessment & Plan  Bilateral infiltrates on CT scan  Follow-up blood cultures and sputum cultures  Start ceftriaxone and doxycycline    Cauda equina compression (HCC)  Assessment & Plan  Decompression and laminectomy tentative next week    Lumbar stenosis  Assessment & Plan  Decompression and laminectomy tentative next week    Fall  Assessment & Plan  Patient did not lose consciousness or hit his head  Complains of neck pain, back pain and hip pain  PT OT eval    Acute respiratory failure with hypoxia (HCC)- (present on admission)  Assessment & Plan  On 3 L of O2 above baseline  Weaning down to  baseline    Thoracic back pain- (present on admission)  Assessment & Plan  Acute on chronic back pain  CT scans are negative  Straight leg test negative    Ordered MRI back PENDING    Diabetes mellitus type 2, insulin dependent, poorly controlled with marked hyperglycemia (HCC)- (present on admission)  Assessment & Plan  Start on insulin sliding scale with serial Accu-Checks  Check hemoglobin A1c  Hypoglycemic protocol in place  Increased Lantus, uncontrolled   Improved    Acute esophagitis- (present on admission)  Assessment & Plan  Continue Protonix         VTE prophylaxis: SCDs    I have performed a physical exam and reviewed and updated ROS and Plan today (5/30/2024). In review of yesterday's note (5/29/2024), there are no changes except as documented above.

## 2024-05-31 NOTE — PROGRESS NOTES
Pt unable to complete xray of spine due to inability to stand for a long period of time. MD notified.

## 2024-05-31 NOTE — DISCHARGE PLANNING
Complex Case Management    Order received for Complex Case Management. Patient's chart has been reviewed.     Complex case management following? Yes    Yes: Case assigned to Wesley Ford     NOTE: There may be cases that are NOT assigned to a CCM but will be followed for progression of care by leaders of the Complex Discharge Committee.

## 2024-05-31 NOTE — DISCHARGE PLANNING
Case Management Discharge Planning    Admission Date: 5/27/2024  GMLOS: 4.1  ALOS: 4    6-Clicks ADL Score: 17  6-Clicks Mobility Score: 18    Anticipated Discharge Dispo: Discharge Disposition: D/T to SNF with Medicare cert in anticipation of skilled care (03)      Action(s) Taken: RNCM participated in IDT rounds. Patient is pending surgery-6/4.     Escalations Completed: None    Medically Clear: No    Next Steps: RNCM escalated patient to complex case management. Patient was recently Dc'd on 5/23 and went home with [facility]. Patient lives in a mobile home. Patients son lives in [place]. Patient is currently being declined from SNF's due to insurance and/or level of care needed. Will resend referrals after surgery and PT/OT eval.     Barriers to Discharge: Medical clearance, Pending Placement, and Pending Procedures    Is the patient up for discharge tomorrow: No

## 2024-05-31 NOTE — PROGRESS NOTES
Hospital Medicine Daily Progress Note    Date of Service  5/31/2024    Chief Complaint  Christoph Taylor is a 58 y.o. male admitted 5/27/2024 with Back Pain and Suicidal Ideation        Hospital Course  No notes on file    Interval Problem Update  I reviewed the chart along with vitals, labs, imaging, test (both pending and resulted) and recommendations from specialists and interdisciplinary team.  Managing pneumonia and uncontrolled diabetes  BG 300s initially now improved wityh increased Lantus  Bld cx pending.  Known incontinence and diaper use for months and drop foot with back pain. sees [facility] MRI came back showing sveere lumbar stenosis with cauda equina  R foot XR reviewed chronic degenerative changes and flat foot, he can follow outpatient Orthopedics. Ordered AFO     Patient has complained mainly about foot, denies Charcot, as well as months of fecal incontinence and back pain. Explained diabetes and CT results to him. Patient otherwise stable. More improtantly I discussed with LULA Shi. Patient was seen at [facility] last year. He has had fott drop and incontinence for months. After discussion, he has agreed to laminectomy and decompression planned for next week. Standing XRs ordered finally done.  Discussed with Psychiatry. Adjusting antidepressants. Updated and explained plan to him.     Patient slightly better mood, pain better controlled. Happy that procedure scheduled TUESDAY tentatively. PT recommends SNF        I have discussed this patient's plan of care and discharge plan at IDT rounds today with Case Management, Nursing, Nursing leadership, and other members of the IDT team.    Consultants/Specialty      Code Status  Full Code    Disposition  Medically Cleared  I have placed the appropriate orders for post-discharge needs.    Review of Systems  Review of Systems   Constitutional:  Negative for chills and fever.   HENT:  Negative for congestion, hearing loss and nosebleeds.     Eyes:  Negative for pain and redness.   Respiratory:  Negative for cough, hemoptysis, shortness of breath and wheezing.    Cardiovascular:  Negative for chest pain and palpitations.   Gastrointestinal:  Negative for abdominal pain, blood in stool, constipation, diarrhea, nausea and vomiting.   Genitourinary:  Negative for dysuria, frequency and hematuria.   Musculoskeletal:  Positive for back pain, joint pain and myalgias. Negative for falls.   Skin:  Negative for rash.   Neurological:  Negative for dizziness, tremors, focal weakness, seizures, loss of consciousness, weakness and headaches.   Psychiatric/Behavioral:  Positive for depression. The patient is not nervous/anxious and does not have insomnia.    All other systems reviewed and are negative.       Physical Exam  Temp:  [36.1 °C (96.9 °F)-36.6 °C (97.9 °F)] 36.6 °C (97.9 °F)  Pulse:  [75-96] 75  Resp:  [16-18] 18  BP: ()/(55-91) 98/55  SpO2:  [91 %-94 %] 91 %    Physical Exam  Vitals and nursing note reviewed.   HENT:      Head: Normocephalic and atraumatic.      Right Ear: External ear normal.      Left Ear: External ear normal.      Nose: Nose normal.      Mouth/Throat:      Mouth: Mucous membranes are moist.   Eyes:      General: No scleral icterus.     Conjunctiva/sclera: Conjunctivae normal.   Cardiovascular:      Rate and Rhythm: Normal rate and regular rhythm.      Heart sounds: No murmur heard.     No friction rub. No gallop.   Pulmonary:      Effort: Pulmonary effort is normal.      Breath sounds: Normal breath sounds.   Abdominal:      General: Abdomen is flat. Bowel sounds are normal. There is no distension.      Palpations: Abdomen is soft.      Tenderness: There is no abdominal tenderness. There is no guarding.   Musculoskeletal:         General: Tenderness (myalgia,back pain) present. Normal range of motion.      Cervical back: Normal range of motion and neck supple.   Skin:     General: Skin is warm.   Neurological:      Mental Status:  He is alert and oriented to person, place, and time. Mental status is at baseline.      Motor: Weakness present.   Psychiatric:         Mood and Affect: Mood normal.         Behavior: Behavior normal.         Thought Content: Thought content normal.         Judgment: Judgment normal.         Fluids    Intake/Output Summary (Last 24 hours) at 5/31/2024 1658  Last data filed at 5/31/2024 1535  Gross per 24 hour   Intake 600 ml   Output 1475 ml   Net -875 ml       Laboratory  Recent Labs     05/29/24 0315 05/30/24 0147 05/31/24  0617   WBC 4.9 4.2* 4.5*   RBC 4.05* 3.97* 4.12*   HEMOGLOBIN 11.5* 11.4* 11.4*   HEMATOCRIT 34.0* 33.0* 34.7*   MCV 84.0 83.1 84.2   MCH 28.4 28.7 27.7   MCHC 33.8 34.5 32.9   RDW 41.2 39.8 40.8   PLATELETCT 196 219 208   MPV 9.1 8.9* 8.9*     Recent Labs     05/29/24 0315 05/30/24  0147 05/31/24  0617   SODIUM 139 142 137   POTASSIUM 3.9 4.0 4.5   CHLORIDE 103 105 102   CO2 27 26 27   GLUCOSE 113* 79 220*   BUN 23* 18 18   CREATININE 0.53 0.49* 0.49*   CALCIUM 8.4* 8.4* 8.3*     Recent Labs     05/31/24  0617   APTT 29.6   INR 0.92               Imaging  DX-LUMBAR SPINE-2 OR 3 VIEWS   Final Result      1.  Multilevel degenerative changes of the lumbar spine.   2.  Mild grade 1 retrolisthesis visible with extension maneuver at L2-3 and L3-4 which could indicate hypermobility.      MR-THORACIC SPINE-WITH & W/O   Final Result         Remote endplate compression fractures at T1, T2, T12 without significant posterior cortical retropulsion or canal stenosis.      Mild degenerative changes of the thoracic spine without significant canal stenosis or foraminal narrowing.         MR-LUMBAR SPINE-WITH & W/O   Final Result         Severe canal stenosis at L4-5 with cauda equina compression. There is also severe right foraminal narrowing.      Postoperative changes at L5-S1 with a well decompressed canal.      Remote compression fracture involving the superior endplate of T12 with 50% loss of height.       DX-FOOT-2- LEFT   Final Result      No acute osseous abnormality.      Prominent degenerative changes within the mid and hindfoot with pes planus type deformity.      CT-LSPINE W/O PLUS RECONS   Final Result         1.  No acute traumatic bony injury of the lumbar spine.      CT-TSPINE W/O PLUS RECONS   Final Result         1.  No acute traumatic bony injury of the thoracic spine.      CT-CHEST,ABDOMEN,PELVIS WITH   Final Result      MR-CERVICAL SPINE-W/O    (Results Pending)        Assessment/Plan  * Pneumonia due to infectious organism- (present on admission)  Assessment & Plan  Bilateral infiltrates on CT scan  Follow-up blood cultures and sputum cultures  Start ceftriaxone and doxycycline    Cauda equina compression (HCC)  Assessment & Plan  Decompression and laminectomy tentative Tues    Lumbar stenosis  Assessment & Plan  Decompression and laminectomy tentative next week    Fall  Assessment & Plan  Patient did not lose consciousness or hit his head  Complains of neck pain, back pain and hip pain  PT OT eval    Acute respiratory failure with hypoxia (HCC)- (present on admission)  Assessment & Plan  On 3 L of O2 above baseline  Weaning down to baseline    Thoracic back pain- (present on admission)  Assessment & Plan  Acute on chronic back pain  CT scans are negative  Straight leg test negative    Ordered MRI back reviewed results  Redo back surgery tentative tuesday    Diabetes mellitus type 2, insulin dependent, poorly controlled with marked hyperglycemia (HCC)- (present on admission)  Assessment & Plan  Start on insulin sliding scale with serial Accu-Checks  Check hemoglobin A1c  Hypoglycemic protocol in place  Increased Lantus, uncontrolled   Improved    Acute esophagitis- (present on admission)  Assessment & Plan  Continue Protonix         VTE prophylaxis: SCDs    I have performed a physical exam and reviewed and updated ROS and Plan today (5/31/2024). In review of yesterday's note (5/30/2024), there  are no changes except as documented above.

## 2024-05-31 NOTE — CARE PLAN
The patient is Watcher - Medium risk of patient condition declining or worsening    Shift Goals  Clinical Goals: Pt will maintain adequate blood glucose throughout shift.  Patient Goals: Snacks  Family Goals: KATHERINE  Pts blood sugars were not within parameters throughout shift but were treated with insulin as per MAR.    Progress made toward(s) clinical / shift goals:    Problem: Pain - Standard  Goal: Alleviation of pain or a reduction in pain to the patient’s comfort goal  Outcome: Progressing  Pts pain was maintained at his comfort level throughout shift with mediations given as per MAR.     Problem: Fall Risk  Goal: Patient will remain free from falls  Outcome: Progressing   Pt remained free from falls throughout shift with hourly rounding, belongings within reach, and ambulation around [place]arro with a 1 per assist and FWW.    Patient is not progressing towards the following goals:NA

## 2024-05-31 NOTE — CARE PLAN
The patient is Stable - Low risk of patient condition declining or worsening    Shift Goals  Clinical Goals: monitor blood glucose  Patient Goals: rest, comfort  Family Goals: vicente    Progress made toward(s) clinical / shift goals:  pt is a&o x4. O2 sats maintained on room air. Pain medication given per MAR. IV patent and infusing. Bed alarm on. All needs addressed at this time.    Patient is not progressing towards the following goals:

## 2024-06-01 ENCOUNTER — APPOINTMENT (OUTPATIENT)
Dept: RADIOLOGY | Facility: MEDICAL CENTER | Age: 59
DRG: 459 | End: 2024-06-01
Attending: PHYSICIAN ASSISTANT
Payer: COMMERCIAL

## 2024-06-01 LAB
ALBUMIN SERPL BCP-MCNC: 3.1 G/DL (ref 3.2–4.9)
BUN SERPL-MCNC: 26 MG/DL (ref 8–22)
CALCIUM ALBUM COR SERPL-MCNC: 9.1 MG/DL (ref 8.5–10.5)
CALCIUM SERPL-MCNC: 8.4 MG/DL (ref 8.5–10.5)
CHLORIDE SERPL-SCNC: 104 MMOL/L (ref 96–112)
CO2 SERPL-SCNC: 26 MMOL/L (ref 20–33)
CREAT SERPL-MCNC: 0.51 MG/DL (ref 0.5–1.4)
ERYTHROCYTE [DISTWIDTH] IN BLOOD BY AUTOMATED COUNT: 41.3 FL (ref 35.9–50)
GFR SERPLBLD CREATININE-BSD FMLA CKD-EPI: 116 ML/MIN/1.73 M 2
GLUCOSE BLD STRIP.AUTO-MCNC: 104 MG/DL (ref 65–99)
GLUCOSE BLD STRIP.AUTO-MCNC: 120 MG/DL (ref 65–99)
GLUCOSE BLD STRIP.AUTO-MCNC: 135 MG/DL (ref 65–99)
GLUCOSE BLD STRIP.AUTO-MCNC: 94 MG/DL (ref 65–99)
GLUCOSE BLD STRIP.AUTO-MCNC: 94 MG/DL (ref 65–99)
GLUCOSE SERPL-MCNC: 128 MG/DL (ref 65–99)
HCT VFR BLD AUTO: 32.2 % (ref 42–52)
HGB BLD-MCNC: 10.9 G/DL (ref 14–18)
MCH RBC QN AUTO: 28.5 PG (ref 27–33)
MCHC RBC AUTO-ENTMCNC: 33.9 G/DL (ref 32.3–36.5)
MCV RBC AUTO: 84.1 FL (ref 81.4–97.8)
PHOSPHATE SERPL-MCNC: 3.8 MG/DL (ref 2.5–4.5)
PLATELET # BLD AUTO: 206 K/UL (ref 164–446)
PMV BLD AUTO: 8.8 FL (ref 9–12.9)
POTASSIUM SERPL-SCNC: 4.3 MMOL/L (ref 3.6–5.5)
RBC # BLD AUTO: 3.83 M/UL (ref 4.7–6.1)
SODIUM SERPL-SCNC: 139 MMOL/L (ref 135–145)
WBC # BLD AUTO: 4.7 K/UL (ref 4.8–10.8)

## 2024-06-01 PROCEDURE — 72141 MRI NECK SPINE W/O DYE: CPT

## 2024-06-01 PROCEDURE — 36415 COLL VENOUS BLD VENIPUNCTURE: CPT

## 2024-06-01 PROCEDURE — 700102 HCHG RX REV CODE 250 W/ 637 OVERRIDE(OP): Performed by: HOSPITALIST

## 2024-06-01 PROCEDURE — 85027 COMPLETE CBC AUTOMATED: CPT

## 2024-06-01 PROCEDURE — 700111 HCHG RX REV CODE 636 W/ 250 OVERRIDE (IP): Performed by: HOSPITALIST

## 2024-06-01 PROCEDURE — A9270 NON-COVERED ITEM OR SERVICE: HCPCS | Performed by: INTERNAL MEDICINE

## 2024-06-01 PROCEDURE — 82962 GLUCOSE BLOOD TEST: CPT | Mod: 91

## 2024-06-01 PROCEDURE — 80069 RENAL FUNCTION PANEL: CPT

## 2024-06-01 PROCEDURE — 700102 HCHG RX REV CODE 250 W/ 637 OVERRIDE(OP)

## 2024-06-01 PROCEDURE — 770006 HCHG ROOM/CARE - MED/SURG/GYN SEMI*

## 2024-06-01 PROCEDURE — A9270 NON-COVERED ITEM OR SERVICE: HCPCS | Performed by: HOSPITALIST

## 2024-06-01 PROCEDURE — 99232 SBSQ HOSP IP/OBS MODERATE 35: CPT | Performed by: INTERNAL MEDICINE

## 2024-06-01 PROCEDURE — 72100 X-RAY EXAM L-S SPINE 2/3 VWS: CPT

## 2024-06-01 PROCEDURE — A9270 NON-COVERED ITEM OR SERVICE: HCPCS

## 2024-06-01 PROCEDURE — 700101 HCHG RX REV CODE 250: Performed by: HOSPITALIST

## 2024-06-01 PROCEDURE — 700111 HCHG RX REV CODE 636 W/ 250 OVERRIDE (IP)

## 2024-06-01 PROCEDURE — 700101 HCHG RX REV CODE 250

## 2024-06-01 PROCEDURE — 700102 HCHG RX REV CODE 250 W/ 637 OVERRIDE(OP): Performed by: INTERNAL MEDICINE

## 2024-06-01 RX ORDER — OXYCODONE HYDROCHLORIDE 5 MG/1
5 TABLET ORAL
Status: DISCONTINUED | OUTPATIENT
Start: 2024-06-01 | End: 2024-06-11 | Stop reason: HOSPADM

## 2024-06-01 RX ORDER — HYDROMORPHONE HYDROCHLORIDE 1 MG/ML
0.5 INJECTION, SOLUTION INTRAMUSCULAR; INTRAVENOUS; SUBCUTANEOUS
Status: DISCONTINUED | OUTPATIENT
Start: 2024-06-01 | End: 2024-06-11 | Stop reason: HOSPADM

## 2024-06-01 RX ORDER — OXYCODONE HYDROCHLORIDE 10 MG/1
10 TABLET ORAL
Status: DISCONTINUED | OUTPATIENT
Start: 2024-06-01 | End: 2024-06-11 | Stop reason: HOSPADM

## 2024-06-01 RX ORDER — LIDOCAINE 4 G/G
2 PATCH TOPICAL EVERY 24 HOURS
Status: DISCONTINUED | OUTPATIENT
Start: 2024-06-01 | End: 2024-06-11 | Stop reason: HOSPADM

## 2024-06-01 RX ADMIN — Medication 1000 UNITS: at 04:41

## 2024-06-01 RX ADMIN — CEFTRIAXONE SODIUM 2000 MG: 10 INJECTION, POWDER, FOR SOLUTION INTRAVENOUS at 18:07

## 2024-06-01 RX ADMIN — OXYCODONE HYDROCHLORIDE 10 MG: 10 TABLET ORAL at 10:47

## 2024-06-01 RX ADMIN — HYDROMORPHONE HYDROCHLORIDE 0.5 MG: 1 INJECTION, SOLUTION INTRAMUSCULAR; INTRAVENOUS; SUBCUTANEOUS at 11:02

## 2024-06-01 RX ADMIN — OMEPRAZOLE 20 MG: 20 CAPSULE, DELAYED RELEASE ORAL at 18:07

## 2024-06-01 RX ADMIN — OXYCODONE HYDROCHLORIDE 10 MG: 10 TABLET ORAL at 07:46

## 2024-06-01 RX ADMIN — HYDROMORPHONE HYDROCHLORIDE 0.5 MG: 1 INJECTION, SOLUTION INTRAMUSCULAR; INTRAVENOUS; SUBCUTANEOUS at 02:40

## 2024-06-01 RX ADMIN — VENLAFAXINE HYDROCHLORIDE 75 MG: 75 CAPSULE, EXTENDED RELEASE ORAL at 09:38

## 2024-06-01 RX ADMIN — OMEPRAZOLE 20 MG: 20 CAPSULE, DELAYED RELEASE ORAL at 04:41

## 2024-06-01 RX ADMIN — LIDOCAINE 2 PATCH: 4 PATCH TOPICAL at 02:41

## 2024-06-01 RX ADMIN — DOXYCYCLINE 100 MG: 100 TABLET, FILM COATED ORAL at 18:07

## 2024-06-01 RX ADMIN — OXYCODONE HYDROCHLORIDE 5 MG: 5 TABLET ORAL at 00:39

## 2024-06-01 RX ADMIN — ASPIRIN 81 MG: 81 TABLET, COATED ORAL at 18:07

## 2024-06-01 RX ADMIN — CYANOCOBALAMIN TAB 500 MCG 1000 MCG: 500 TAB at 04:41

## 2024-06-01 RX ADMIN — OXYCODONE HYDROCHLORIDE 10 MG: 10 TABLET ORAL at 20:00

## 2024-06-01 RX ADMIN — DOXYCYCLINE 100 MG: 100 TABLET, FILM COATED ORAL at 04:41

## 2024-06-01 ASSESSMENT — ENCOUNTER SYMPTOMS
FEVER: 0
EYE REDNESS: 0
LOSS OF CONSCIOUSNESS: 0
DEPRESSION: 1
VOMITING: 0
NAUSEA: 0
WHEEZING: 0
ABDOMINAL PAIN: 0
TREMORS: 0
HEMOPTYSIS: 0
MYALGIAS: 1
DIZZINESS: 0
INSOMNIA: 0
CHILLS: 0
CONSTIPATION: 0
SEIZURES: 0
WEAKNESS: 0
NERVOUS/ANXIOUS: 0
COUGH: 0
SHORTNESS OF BREATH: 0
FOCAL WEAKNESS: 0
BLOOD IN STOOL: 0
BACK PAIN: 1
PALPITATIONS: 0
EYE PAIN: 0
FALLS: 0
DIARRHEA: 0
HEADACHES: 0

## 2024-06-01 ASSESSMENT — PAIN DESCRIPTION - PAIN TYPE
TYPE: ACUTE PAIN
TYPE: ACUTE PAIN;CHRONIC PAIN
TYPE: ACUTE PAIN
TYPE: CHRONIC PAIN
TYPE: CHRONIC PAIN
TYPE: ACUTE PAIN

## 2024-06-01 ASSESSMENT — FIBROSIS 4 INDEX: FIB4 SCORE: 0.74

## 2024-06-01 NOTE — CARE PLAN
The patient is Stable - Low risk of patient condition declining or worsening    Shift Goals  Clinical Goals: (P) pt to have their MRI and Xray done today  Patient Goals: (P) Improve their pain scale  Family Goals: (P) KATHERINE    Progress made toward(s) clinical / shift goals:    Problem: Fall Risk  Goal: Patient will remain free from falls  Outcome: Progressing     Problem: Skin Integrity  Goal: Skin integrity is maintained or improved  Outcome: Progressing    Pt ambulating to bathroom and up for meals.

## 2024-06-01 NOTE — PROGRESS NOTES
Hospital Medicine Daily Progress Note    Date of Service  6/1/2024    Chief Complaint  Christoph Taylor is a 58 y.o. male admitted 5/27/2024 with Back Pain and Suicidal Ideation        Hospital Course  No notes on file    Interval Problem Update  I reviewed the chart along with vitals, labs, imaging, test (both pending and resulted) and recommendations from specialists and interdisciplinary team.  Managing pneumonia and uncontrolled diabetes  BG 300s initially now improved wityh increased Lantus  Bld cx pending.  Known incontinence and diaper use for months and drop foot with back pain. sees [facility] came back showing sveere lumbar stenosis with cauda equina  R foot XR reviewed chronic degenerative changes and flat foot, he can follow outpatient Orthopedics. Ordered AFO     Patient has complained mainly about foot, denies Charcot, as well as months of fecal incontinence and back pain. Explained diabetes and CT results to him. Patient otherwise stable. More improtantly I discussed with LULA Shi. Patient was seen at [facility] last year. He has had fott drop and incontinence for months. After discussion, he has agreed to laminectomy and decompression planned for next week. Standing XRs ordered finally done.  Discussed with Psychiatry. Adjusting antidepressants. Updated and explained plan to him.   MR cervical PENDING    Patient slightly better mood, pain better controlled. Happy that procedure scheduled TUESDAY tentatively. PT recommends SNF        I have discussed this patient's plan of care and discharge plan at IDT rounds today with Case Management, Nursing, Nursing leadership, and other members of the IDT team.    Consultants/Specialty      Code Status  Full Code    Disposition  The patient is not medically cleared for discharge to home or a post-acute facility.      I have placed the appropriate orders for post-discharge needs.    Review of Systems  Review of Systems   Constitutional:  Negative  for chills and fever.   HENT:  Negative for congestion, hearing loss and nosebleeds.    Eyes:  Negative for pain and redness.   Respiratory:  Negative for cough, hemoptysis, shortness of breath and wheezing.    Cardiovascular:  Negative for chest pain and palpitations.   Gastrointestinal:  Negative for abdominal pain, blood in stool, constipation, diarrhea, nausea and vomiting.   Genitourinary:  Negative for dysuria, frequency and hematuria.   Musculoskeletal:  Positive for back pain, joint pain and myalgias. Negative for falls.   Skin:  Negative for rash.   Neurological:  Negative for dizziness, tremors, focal weakness, seizures, loss of consciousness, weakness and headaches.   Psychiatric/Behavioral:  Positive for depression. The patient is not nervous/anxious and does not have insomnia.    All other systems reviewed and are negative.       Physical Exam  Temp:  [35.9 °C (96.6 °F)-36.7 °C (98.1 °F)] 35.9 °C (96.6 °F)  Pulse:  [69-87] 69  Resp:  [16-18] 17  BP: (108-124)/(65-67) 108/67  SpO2:  [91 %-94 %] 93 %    Physical Exam  Vitals and nursing note reviewed.   HENT:      Head: Normocephalic and atraumatic.      Right Ear: External ear normal.      Left Ear: External ear normal.      Nose: Nose normal.      Mouth/Throat:      Mouth: Mucous membranes are moist.   Eyes:      General: No scleral icterus.     Conjunctiva/sclera: Conjunctivae normal.   Cardiovascular:      Rate and Rhythm: Normal rate and regular rhythm.      Heart sounds: No murmur heard.     No friction rub. No gallop.   Pulmonary:      Effort: Pulmonary effort is normal.      Breath sounds: Normal breath sounds.   Abdominal:      General: Abdomen is flat. Bowel sounds are normal. There is no distension.      Palpations: Abdomen is soft.      Tenderness: There is no abdominal tenderness. There is no guarding.   Musculoskeletal:         General: Tenderness (myalgia,back pain) present. Normal range of motion.      Cervical back: Normal range of motion  and neck supple.   Skin:     General: Skin is warm.   Neurological:      Mental Status: He is alert and oriented to person, place, and time. Mental status is at baseline.      Motor: Weakness present.   Psychiatric:         Mood and Affect: Mood normal.         Behavior: Behavior normal.         Thought Content: Thought content normal.         Judgment: Judgment normal.         Fluids    Intake/Output Summary (Last 24 hours) at 6/1/2024 1646  Last data filed at 6/1/2024 1430  Gross per 24 hour   Intake 840 ml   Output 1500 ml   Net -660 ml       Laboratory  Recent Labs     05/30/24 0147 05/31/24 0617 06/01/24  0251   WBC 4.2* 4.5* 4.7*   RBC 3.97* 4.12* 3.83*   HEMOGLOBIN 11.4* 11.4* 10.9*   HEMATOCRIT 33.0* 34.7* 32.2*   MCV 83.1 84.2 84.1   MCH 28.7 27.7 28.5   MCHC 34.5 32.9 33.9   RDW 39.8 40.8 41.3   PLATELETCT 219 208 206   MPV 8.9* 8.9* 8.8*     Recent Labs     05/30/24 0147 05/31/24 0617 06/01/24  0251   SODIUM 142 137 139   POTASSIUM 4.0 4.5 4.3   CHLORIDE 105 102 104   CO2 26 27 26   GLUCOSE 79 220* 128*   BUN 18 18 26*   CREATININE 0.49* 0.49* 0.51   CALCIUM 8.4* 8.3* 8.4*     Recent Labs     05/31/24 0617   APTT 29.6   INR 0.92               Imaging  MR-CERVICAL SPINE-W/O   Final Result      1.  Multilevel multifactorial degenerative changes   2.  High-grade central canal narrowing at C4-C5, C5-C6 and C6-C7 with findings suspicious for myelomalacia at C4-C6   3.  Other areas of central canal and neural foraminal narrowing as described above      DX-LUMBAR SPINE-BEND OR FLEX-EXT   Final Result      L1-L4 degenerative retrolisthesis without definite significant hypermobility.      DX-LUMBAR SPINE-2 OR 3 VIEWS   Final Result      1.  Multilevel degenerative changes of the lumbar spine.   2.  Mild grade 1 retrolisthesis visible with extension maneuver at L2-3 and L3-4 which could indicate hypermobility.      MR-THORACIC SPINE-WITH & W/O   Final Result         Remote endplate compression fractures at T1,  T2, T12 without significant posterior cortical retropulsion or canal stenosis.      Mild degenerative changes of the thoracic spine without significant canal stenosis or foraminal narrowing.         MR-LUMBAR SPINE-WITH & W/O   Final Result         Severe canal stenosis at L4-5 with cauda equina compression. There is also severe right foraminal narrowing.      Postoperative changes at L5-S1 with a well decompressed canal.      Remote compression fracture involving the superior endplate of T12 with 50% loss of height.      DX-FOOT-2- LEFT   Final Result      No acute osseous abnormality.      Prominent degenerative changes within the mid and hindfoot with pes planus type deformity.      CT-LSPINE W/O PLUS RECONS   Final Result         1.  No acute traumatic bony injury of the lumbar spine.      CT-TSPINE W/O PLUS RECONS   Final Result         1.  No acute traumatic bony injury of the thoracic spine.      CT-CHEST,ABDOMEN,PELVIS WITH   Final Result           Assessment/Plan  * Pneumonia due to infectious organism- (present on admission)  Assessment & Plan  Bilateral infiltrates on CT scan  Follow-up blood cultures and sputum cultures  Start ceftriaxone and doxycycline    Cauda equina compression (HCC)  Assessment & Plan  Decompression and laminectomy tentative Tues MR cervical PENDING    Lumbar stenosis  Assessment & Plan  Decompression and laminectomy tentative next week    Fall  Assessment & Plan  Patient did not lose consciousness or hit his head  Complains of neck pain, back pain and hip pain  PT OT eval    Acute respiratory failure with hypoxia (HCC)- (present on admission)  Assessment & Plan  On 3 L of O2 above baseline  Weaning down to baseline    Thoracic back pain- (present on admission)  Assessment & Plan  Acute on chronic back pain  CT scans are negative  Straight leg test negative    Ordered MRI back reviewed results  Redo back surgery tentative tuesday    Diabetes mellitus type 2, insulin dependent,  poorly controlled with marked hyperglycemia (HCC)- (present on admission)  Assessment & Plan  Start on insulin sliding scale with serial Accu-Checks  Check hemoglobin A1c  Hypoglycemic protocol in place  Increased Lantus, uncontrolled   Improved    Acute esophagitis- (present on admission)  Assessment & Plan  Continue Protonix         VTE prophylaxis: SCDs    I have performed a physical exam and reviewed and updated ROS and Plan today (6/1/2024). In review of yesterday's note (5/31/2024), there are no changes except as documented above.

## 2024-06-01 NOTE — CARE PLAN
The patient is Watcher - Medium risk of patient condition declining or worsening    Shift Goals  Clinical Goals: monitor blood glucose  Patient Goals: rest, comfort  Family Goals: vicente    Progress made toward(s) clinical / shift goals:  pt is a&o x4. O2 sats maintained on room air. IV patent and saline locked. Pain medication given per MAR. Bed alarm in place, call light within reach. All needs addressed at this time.    Patient is not progressing towards the following goals:

## 2024-06-01 NOTE — PROGRESS NOTES
Neurosurgery Progress Note    Subjective:  57 yo man presented with a fall about one week ago with resultant increased right foot weakness. MRI shows critical L4-5 spondylosis and stenosis. Has a history of a prior L5-S1 onlay fusion 30 years ago.     Reporting back pain and right greater than left lower extremity radiculopathy.   Right foot weakness requiring AFO brace.     Lumbar x-rays  show multilevel spondylosis with grade 1 retrolisthesis of L2 on L3 and L3 on L4.  Cervical MRI pending as he was also found to have interossei wasting.     Currently being treated for pneumonia and uncontrolled diabetes.       Exam:  Bilateral interossei wasting, 4-/5 bilateral interossei.  Motor right EHL/AT 4/5, limited due to flat foot on the left with 4/5, othwerise 5/5 in bilateral lower extremities.   Sensation intact except distally in the lower extremities    BP  Min: 98/55  Max: 124/65  Pulse  Av.5  Min: 75  Max: 87  Resp  Av.6  Min: 16  Max: 18  Temp  Av.4 °C (97.5 °F)  Min: 36.1 °C (96.9 °F)  Max: 36.7 °C (98.1 °F)  SpO2  Av.5 %  Min: 91 %  Max: 94 %    No data recorded    Recent Labs     24  0147 24  0617 24  0251   WBC 4.2* 4.5* 4.7*   RBC 3.97* 4.12* 3.83*   HEMOGLOBIN 11.4* 11.4* 10.9*   HEMATOCRIT 33.0* 34.7* 32.2*   MCV 83.1 84.2 84.1   MCH 28.7 27.7 28.5   MCHC 34.5 32.9 33.9   RDW 39.8 40.8 41.3   PLATELETCT 219 208 206   MPV 8.9* 8.9* 8.8*     Recent Labs     24  0147 24  0617 24  0251   SODIUM 142 137 139   POTASSIUM 4.0 4.5 4.3   CHLORIDE 105 102 104   CO2 26 27 26   GLUCOSE 79 220* 128*   BUN 18 18 26*   CREATININE 0.49* 0.49* 0.51   CALCIUM 8.4* 8.3* 8.4*     Recent Labs     24  0617   APTT 29.6   INR 0.92           Intake/Output                         24 - 24 0659 24 -  Total 1807-1595 8097-2237 Total                 Intake    P.O.  240  240 480  --  -- --    P.O. 240 240 480  -- -- --    Total Intake 240 240 480 -- -- --       Output    Urine  475  900 1375  --  -- --    Number of Times Voided 4 x -- 4 x -- -- --    Urine Void (mL)  -- -- --    Stool  --  -- --  --  -- --    Number of Times Stooled 1 x -- 1 x -- -- --    Total Output  -- -- --       Net I/O     -818 -660 -895 -- -- --              Intake/Output Summary (Last 24 hours) at 6/1/2024 0935  Last data filed at 6/1/2024 0500  Gross per 24 hour   Intake 480 ml   Output 900 ml   Net -420 ml             Pharmacy Consult Request  1 Each PHARMACY TO DOSE    oxyCODONE immediate-release  5 mg Q3HRS PRN    Or    oxyCODONE immediate-release  10 mg Q3HRS PRN    Or    HYDROmorphone  0.5 mg Q3HRS PRN    lidocaine  2 Patch Q24HR    venlafaxine XR  75 mg QDAY with Breakfast    cyanocobalamin  1,000 mcg DAILY    vitamin D3  1,000 Units DAILY    melatonin  5 mg HS PRN    acetaminophen  650 mg Q6HRS PRN    ondansetron  4 mg Q4HRS PRN    ondansetron  4 mg Q4HRS PRN    promethazine  12.5-25 mg Q4HRS PRN    promethazine  12.5-25 mg Q4HRS PRN    prochlorperazine  5-10 mg Q4HRS PRN    insulin regular  2-9 Units 4X/DAY ACHS    And    dextrose bolus  25 g Q15 MIN PRN    aspirin  81 mg Q EVENING    metoclopramide  10 mg TID PRN    omeprazole  20 mg BID    cefTRIAXone (ROCEPHIN) IV  2,000 mg Q EVENING    doxycycline monohydrate  100 mg Q12HRS    insulin GLARGINE  25 Units BID       Assessment and Plan:  Hospital day # 6  Flexion/extension x-rays pending.  Cervical MRI pending.   Discussed plan of care with Dr. Pulido who is recommendinga  redo L5 and L4 laminectomy with possible stealth fusion 6/4/24 (Tuesday).  Will appreciate medical risk stratification/medical clearance for a two hour prone procedure Tuesday.   Following    Chemical prophylactic DVT therapy: Yes - Lovenox 40mg/qd    Start date/time: okay from NS perspective until surgery 6/4     Brain Compression: No

## 2024-06-02 LAB
ALBUMIN SERPL BCP-MCNC: 3.3 G/DL (ref 3.2–4.9)
BUN SERPL-MCNC: 25 MG/DL (ref 8–22)
CALCIUM ALBUM COR SERPL-MCNC: 9 MG/DL (ref 8.5–10.5)
CALCIUM SERPL-MCNC: 8.4 MG/DL (ref 8.5–10.5)
CHLORIDE SERPL-SCNC: 103 MMOL/L (ref 96–112)
CO2 SERPL-SCNC: 28 MMOL/L (ref 20–33)
CREAT SERPL-MCNC: 0.51 MG/DL (ref 0.5–1.4)
GFR SERPLBLD CREATININE-BSD FMLA CKD-EPI: 116 ML/MIN/1.73 M 2
GLUCOSE BLD STRIP.AUTO-MCNC: 137 MG/DL (ref 65–99)
GLUCOSE BLD STRIP.AUTO-MCNC: 150 MG/DL (ref 65–99)
GLUCOSE BLD STRIP.AUTO-MCNC: 165 MG/DL (ref 65–99)
GLUCOSE BLD STRIP.AUTO-MCNC: 169 MG/DL (ref 65–99)
GLUCOSE SERPL-MCNC: 87 MG/DL (ref 65–99)
PHOSPHATE SERPL-MCNC: 5 MG/DL (ref 2.5–4.5)
POTASSIUM SERPL-SCNC: 4.4 MMOL/L (ref 3.6–5.5)
SODIUM SERPL-SCNC: 142 MMOL/L (ref 135–145)

## 2024-06-02 PROCEDURE — 82962 GLUCOSE BLOOD TEST: CPT | Mod: 91

## 2024-06-02 PROCEDURE — 700111 HCHG RX REV CODE 636 W/ 250 OVERRIDE (IP): Performed by: HOSPITALIST

## 2024-06-02 PROCEDURE — 700111 HCHG RX REV CODE 636 W/ 250 OVERRIDE (IP)

## 2024-06-02 PROCEDURE — 36415 COLL VENOUS BLD VENIPUNCTURE: CPT

## 2024-06-02 PROCEDURE — A9270 NON-COVERED ITEM OR SERVICE: HCPCS | Performed by: INTERNAL MEDICINE

## 2024-06-02 PROCEDURE — 99232 SBSQ HOSP IP/OBS MODERATE 35: CPT | Performed by: INTERNAL MEDICINE

## 2024-06-02 PROCEDURE — A9270 NON-COVERED ITEM OR SERVICE: HCPCS

## 2024-06-02 PROCEDURE — 700102 HCHG RX REV CODE 250 W/ 637 OVERRIDE(OP): Performed by: HOSPITALIST

## 2024-06-02 PROCEDURE — 700101 HCHG RX REV CODE 250: Performed by: HOSPITALIST

## 2024-06-02 PROCEDURE — 700102 HCHG RX REV CODE 250 W/ 637 OVERRIDE(OP)

## 2024-06-02 PROCEDURE — 80069 RENAL FUNCTION PANEL: CPT

## 2024-06-02 PROCEDURE — 99231 SBSQ HOSP IP/OBS SF/LOW 25: CPT | Performed by: STUDENT IN AN ORGANIZED HEALTH CARE EDUCATION/TRAINING PROGRAM

## 2024-06-02 PROCEDURE — A9270 NON-COVERED ITEM OR SERVICE: HCPCS | Performed by: HOSPITALIST

## 2024-06-02 PROCEDURE — 700102 HCHG RX REV CODE 250 W/ 637 OVERRIDE(OP): Performed by: INTERNAL MEDICINE

## 2024-06-02 PROCEDURE — 770006 HCHG ROOM/CARE - MED/SURG/GYN SEMI*

## 2024-06-02 RX ADMIN — DOXYCYCLINE 100 MG: 100 TABLET, FILM COATED ORAL at 04:12

## 2024-06-02 RX ADMIN — INSULIN HUMAN 2 UNITS: 100 INJECTION, SOLUTION PARENTERAL at 13:23

## 2024-06-02 RX ADMIN — OXYCODONE HYDROCHLORIDE 10 MG: 10 TABLET ORAL at 00:18

## 2024-06-02 RX ADMIN — ASPIRIN 81 MG: 81 TABLET, COATED ORAL at 17:25

## 2024-06-02 RX ADMIN — OXYCODONE HYDROCHLORIDE 10 MG: 10 TABLET ORAL at 07:32

## 2024-06-02 RX ADMIN — HYDROMORPHONE HYDROCHLORIDE 0.5 MG: 1 INJECTION, SOLUTION INTRAMUSCULAR; INTRAVENOUS; SUBCUTANEOUS at 02:52

## 2024-06-02 RX ADMIN — OMEPRAZOLE 20 MG: 20 CAPSULE, DELAYED RELEASE ORAL at 04:13

## 2024-06-02 RX ADMIN — CYANOCOBALAMIN TAB 500 MCG 1000 MCG: 500 TAB at 04:12

## 2024-06-02 RX ADMIN — VENLAFAXINE HYDROCHLORIDE 75 MG: 75 CAPSULE, EXTENDED RELEASE ORAL at 08:54

## 2024-06-02 RX ADMIN — OMEPRAZOLE 20 MG: 20 CAPSULE, DELAYED RELEASE ORAL at 17:25

## 2024-06-02 RX ADMIN — Medication 1000 UNITS: at 04:13

## 2024-06-02 RX ADMIN — CEFTRIAXONE SODIUM 2000 MG: 10 INJECTION, POWDER, FOR SOLUTION INTRAVENOUS at 17:25

## 2024-06-02 RX ADMIN — OXYCODONE HYDROCHLORIDE 5 MG: 5 TABLET ORAL at 20:58

## 2024-06-02 RX ADMIN — HYDROMORPHONE HYDROCHLORIDE 0.5 MG: 1 INJECTION, SOLUTION INTRAMUSCULAR; INTRAVENOUS; SUBCUTANEOUS at 10:30

## 2024-06-02 RX ADMIN — DOXYCYCLINE 100 MG: 100 TABLET, FILM COATED ORAL at 17:25

## 2024-06-02 RX ADMIN — INSULIN HUMAN 2 UNITS: 100 INJECTION, SOLUTION PARENTERAL at 20:56

## 2024-06-02 RX ADMIN — OXYCODONE HYDROCHLORIDE 10 MG: 10 TABLET ORAL at 13:17

## 2024-06-02 ASSESSMENT — ENCOUNTER SYMPTOMS
WHEEZING: 0
SEIZURES: 0
COUGH: 0
CONSTIPATION: 0
HEMOPTYSIS: 0
DIARRHEA: 0
HEADACHES: 0
NAUSEA: 0
PALPITATIONS: 0
NERVOUS/ANXIOUS: 0
SHORTNESS OF BREATH: 0
INSOMNIA: 0
FALLS: 0
CHILLS: 0
ABDOMINAL PAIN: 0
MYALGIAS: 1
FEVER: 0
LOSS OF CONSCIOUSNESS: 0
BLOOD IN STOOL: 0
WEAKNESS: 0
BACK PAIN: 1
TREMORS: 0
FOCAL WEAKNESS: 0
DIZZINESS: 0
EYE REDNESS: 0
VOMITING: 0
EYE PAIN: 0
DEPRESSION: 1

## 2024-06-02 ASSESSMENT — PAIN DESCRIPTION - PAIN TYPE
TYPE: ACUTE PAIN

## 2024-06-02 NOTE — CARE PLAN
The patient is Stable - Low risk of patient condition declining or worsening    Shift Goals  Clinical Goals: increase ambulation  Patient Goals: control pain <5  Family Goals: vicente    Progress made toward(s) clinical / shift goals:    Problem: Pain - Standard  Goal: Alleviation of pain or a reduction in pain to the patient’s comfort goal  Outcome: Progressing     Problem: Fall Risk  Goal: Patient will remain free from falls  Outcome: Progressing

## 2024-06-02 NOTE — CARE PLAN
The patient is Stable - Low risk of patient condition declining or worsening    Shift Goals  Clinical Goals: pain management, monitor blood glucose  Patient Goals: rest, comfort  Family Goals: vicente    Progress made toward(s) clinical / shift goals:  pt is a&o x4. O2 sats maintained on room air. Pain medication given per MAR. IV patent and saline locked. Bed alarm in place, call light within reach. All needs addressed at this time.    Patient is not progressing towards the following goals:

## 2024-06-02 NOTE — CONSULTS
"PSYCHIATRIC Followup (established)  Reason for admission: Back pain and Suicidal Ideation  Reason for consult: \"Psychiatric medication evaluation/management--Per Dr. Tripathi\"     Legal Hold Status: Not on legal hold           Chart reviewed.          Interval hx:   The patient reports his mood has been a little better.  He is still hopeful that the surgery on Tuesday will improve his back pain.  He also reports he has been speaking with his roommate and this also improves his spirits as he does not feel lonely.  He has been talking with his sons as well.  He reports he is tolerating venlafaxine without any noted side effects.  He would like to continue this medication.  Advised him to continue taking this medication daily upon discharge as it does take up to a month to realize full effects in terms of benefit for mood.  He is agreeable to receiving outpatient mental health resources which were provided the patient at bedside.  I did advise him that his primary care could continue to prescribe and adjust venlafaxine as needed for mood but he is welcome to establish care with a therapist or psychiatrist if he chooses to after rehab.    He is also still agreeable to speaking with the therapist while he is admitted    Mood:\" It is getting better\"  Sleep: denies problems with sleep  Appetite: appetite is good  Energy: Fair  Denies SI/HI, expresses hope for the future  Denies A/V hallucinations  No delusions    Reviewed nursing noted and spoke with nursing staff. The patient is compliant with medication. The patient has not been aggressive or agitated. The patient ate % of all meals yesterday as of flow sheet yesterday. The patient has not required psychiatric PRN medications in the last 24 hours.  Surgery planned for 6/4/24  Medical team started B12 and Vit D supplements       Medical ROS:   Review of Systems   Respiratory:  Negative for shortness of breath.    Cardiovascular:  Negative for chest pain. " "  Gastrointestinal:  Negative for abdominal pain, nausea and vomiting.   Neurological:  Negative for dizziness, tremors and headaches.          *Psychiatric Examination:  Vitals:    06/02/24 0717   BP: 125/72   Pulse: 85   Resp: 18   Temp: 36.4 °C (97.5 °F)   SpO2: 90%       General Appearance: Appropriately dressed in hospital attire, resting comfortably in bed, appears stated age  Behavior: Appears calm, makes appropriate eye contact,    neuro : no tics or tremors , no dystonias.  No dyskinesias   Speech: normal rate and rhythm, normal volume, no pressured speech or latency or poverty of speech  Thought processes: linear and logical  Abnormal or Psychotic Thoughts: Denies AVH, delusions content appropriate to conversation,   Insight: Good insight,   Judgement: Good judgement  Orientation: oriented to person, place, circumstance and time  Recent and Remote Memory: Appears intact  Attention Span and Concentration: Appears intact to conversation  Language: fluent in english  Mood:\" Getting better\"  Affect: mood congruent, full range  SI/HI: Denies SI or HI  currently, expresses hope for the future        Current Medications:    Current Facility-Administered Medications:     Pharmacy Consult Request ...Pain Management Review 1 Each, 1 Each, Other, PHARMACY TO DOSE, FABBY Berrios.P.R.N.    oxyCODONE immediate-release (Roxicodone) tablet 5 mg, 5 mg, Oral, Q3HRS PRN **OR** oxyCODONE immediate release (Roxicodone) tablet 10 mg, 10 mg, Oral, Q3HRS PRN, 10 mg at 06/02/24 1317 **OR** HYDROmorphone (Dilaudid) injection 0.5 mg, 0.5 mg, Intravenous, Q3HRS PRN, FABBY Berrios.P.R.N., 0.5 mg at 06/02/24 1030    lidocaine (Asperflex) 4 % patch 2 Patch, 2 Patch, Transdermal, Q24HR, NATALEE BerriosP.R.N., 2 Patch at 06/01/24 0241    venlafaxine XR (Effexor XR) capsule 75 mg, 75 mg, Oral, QDAY with Breakfast, Werner Acuna M.D., 75 mg at 06/02/24 0854    cyanocobalamin (Vitamin B-12) tablet 1,000 mcg, " 1,000 mcg, Oral, DAILY, Werner Acuna M.D., 1,000 mcg at 06/02/24 0412    vitamin D3 (Cholecalciferol) tablet 1,000 Units, 1,000 Units, Oral, DAILY, Werner Acuna M.D., 1,000 Units at 06/02/24 0413    melatonin tablet 5 mg, 5 mg, Oral, HS PRN, DIANA Berrios    acetaminophen (Tylenol) tablet 650 mg, 650 mg, Oral, Q6HRS PRN, Lorena De La Cruz M.D.    ondansetron (Zofran) syringe/vial injection 4 mg, 4 mg, Intravenous, Q4HRS PRN, Lorena De La Cruz M.D.    ondansetron (Zofran ODT) dispertab 4 mg, 4 mg, Oral, Q4HRS PRN, Lorena De La Cruz M.D.    promethazine (Phenergan) tablet 12.5-25 mg, 12.5-25 mg, Oral, Q4HRS PRN, Lorena De La Cruz M.D.    promethazine (Phenergan) suppository 12.5-25 mg, 12.5-25 mg, Rectal, Q4HRS PRN, Lorena De La Cruz M.D.    prochlorperazine (Compazine) injection 5-10 mg, 5-10 mg, Intravenous, Q4HRS PRN, Lorena De La Cruz M.D.    insulin regular (HumuLIN R,NovoLIN R) injection, 2-9 Units, Subcutaneous, 4X/DAY ACHS, 2 Units at 06/02/24 1323 **AND** POC blood glucose manual result, , , Q AC AND BEDTIME(S) **AND** NOTIFY MD and PharmD, , , Once **AND** Administer 20 grams of glucose (approximately 8 ounces of fruit juice) every 15 minutes PRN FSBG less than 70 mg/dL, , , PRN **AND** dextrose 50% (D50W) injection 25 g, 25 g, Intravenous, Q15 MIN PRN, Lorena De La Cruz M.D.    aspirin EC tablet 81 mg, 81 mg, Oral, Q EVENING, Lorena De La Cruz M.D., 81 mg at 06/01/24 1807    metoclopramide (Reglan) tablet 10 mg, 10 mg, Oral, TID PRN, Lorena De La Cruz M.D.    omeprazole (PriLOSEC) capsule 20 mg, 20 mg, Oral, BID, Lorena De La Cruz M.D., 20 mg at 06/02/24 0413    cefTRIAXone (Rocephin) syringe 2,000 mg, 2,000 mg, Intravenous, Q EVENING, Lorena De La Cruz M.D., 2,000 mg at 06/01/24 1807    doxycycline monohydrate (Adoxa) tablet 100 mg, 100 mg, Oral, Q12HRS, Lorena De La Cruz M.D., 100 mg at 06/02/24 0412    insulin GLARGINE (Lantus,Semglee) injection, 25 Units, Subcutaneous, BID, Werner Acuna M.D., 25 Units at 06/02/24  0900      Allergies:  No Known Allergies       Labs personally reviewed:   Lab Results   Component Value Date/Time    SODIUM 142 2024 04:15 AM    POTASSIUM 4.4 2024 04:15 AM    CHLORIDE 103 2024 04:15 AM    CO2 28 2024 04:15 AM    GLUCOSE 87 2024 04:15 AM    BUN 25 (H) 2024 04:15 AM    CREATININE 0.51 2024 04:15 AM    BUNCREATRAT 16.7 2024 07:28 AM      Recent Labs     24  0315 24  0617   ASTSGOT 10*  --    ALTSGPT 15  --    TBILIRUBIN 0.2  --    GLOBULIN 2.6  --    INR  --  0.92     Lab Results   Component Value Date/Time    WBC 4.7 (L) 2024 02:51 AM    RBC 3.83 (L) 2024 02:51 AM    HEMOGLOBIN 10.9 (L) 2024 02:51 AM    HEMATOCRIT 32.2 (L) 2024 02:51 AM    MCV 84.1 2024 02:51 AM    MCH 28.5 2024 02:51 AM    MCHC 33.9 2024 02:51 AM    MPV 8.8 (L) 2024 02:51 AM    NEUTSPOLYS 60.80 2024 03:15 AM    LYMPHOCYTES 25.60 2024 03:15 AM    MONOCYTES 7.80 2024 03:15 AM    EOSINOPHILS 5.40 2024 03:15 AM    BASOPHILS 0.00 2024 03:15 AM   Plt-206,00     TSH (24) 1.580               EKG:             Results for orders placed or performed during the hospital encounter of 24   EKG   Result Value Ref Range     Report           [facility]     Test Date:  2024  Pt Name:    BAILEE JACOBSON               Department: ER  MRN:        [id number]                      Room:        21  Gender:     Male                         Technician: [id number]  :        1965                   Requested By:ER TRIAGE PROTOCOL  Order #:    [id number]                    Reading MD: Jaxson Gracia MD     Measurements  Intervals                                Axis  Rate:       66                           P:          39  ND:         172                          QRS:        44  QRSD:       91                           T:          52  QT:         399  QTc:        418      Interpretive Statements  Sinus rhythm, rate of 66, normal intervals and axis, no acute ST elevations  or ischemic changes.  Improved from prior dated 5/20/24  Electronically Signed On 05- 20:24:33 PDT by Jaxson Gracia MD         Assessment: Christoph Taylor is a 58 y.o. male admitted for back pain who reported thoughts of SI to the ED triage nurse. Psychiatry was consulted following evaluation by behavioral health Dr. Tripathi.     Patient reports his mood is getting better.  He has been able to talk with his sons and is during conversations with his roommate.  He is also looking forward to his surgery on Tuesday and is hopeful they will provide him some relief of his pain.  He is tolerating venlafaxine so far without any side effects.  He would like to continue this medication.  Outpatient mental health resources provided to the patient today.  He denies any thoughts of harming self or others.  He denies any hallucinations.  He does not express delusions.    Dx:  Major Depressive Disorder, recurrent, moderate     Medical:  As managed by medical team        Plan:  Legal hold: none  Psychotropic medications:  Continue Venlafaxine XR 75 mg q.d. for treatment of depression  Labs: TSH was within normal limits, B12 and vitamin D supplementation started by primary medical team    Psychiatry will sign off, please reconsult if further psychiatric needs arise     Thank you for the consult.

## 2024-06-02 NOTE — PROGRESS NOTES
Neurosurgery Progress Note    Subjective:  57 yo man presented with a fall about one week ago with resultant increased right foot weakness. MRI shows critical L4-5 spondylosis and stenosis. Has a history of a prior L5-S1 onlay fusion 30 years ago.     Reporting back pain and right greater than left lower extremity radiculopathy.   Right foot weakness requiring AFO brace.     Lumbar x-rays  show multilevel spondylosis with grade 1 retrolisthesis of L2 on L3 and L3 on L4. No dynamic instability.  Cervical MRI  shows multilevel severe central canal stenosis with possible cord signal change.     Currently being treated for pneumonia and uncontrolled diabetes.       Exam:  Bilateral interossei wasting, 4-/5 bilateral interossei.  Motor right EHL/AT 4/5, limited due to flat foot on the left with 4/5, othwerise 5/5 in bilateral lower extremities.   Sensation intact except distally in the lower extremities    BP  Min: 94/64  Max: 125/72  Pulse  Av  Min: 69  Max: 85  Resp  Av.1  Min: 16  Max: 18  Temp  Av.2 °C (97.2 °F)  Min: 35.9 °C (96.6 °F)  Max: 36.4 °C (97.6 °F)  SpO2  Av.3 %  Min: 90 %  Max: 95 %    No data recorded    Recent Labs     24  0617 24  0251   WBC 4.5* 4.7*   RBC 4.12* 3.83*   HEMOGLOBIN 11.4* 10.9*   HEMATOCRIT 34.7* 32.2*   MCV 84.2 84.1   MCH 27.7 28.5   MCHC 32.9 33.9   RDW 40.8 41.3   PLATELETCT 208 206   MPV 8.9* 8.8*     Recent Labs     24  0617 24  0251 24  0415   SODIUM 137 139 142   POTASSIUM 4.5 4.3 4.4   CHLORIDE 102 104 103   CO2 27 26 28   GLUCOSE 220* 128* 87   BUN 18 26* 25*   CREATININE 0.49* 0.51 0.51   CALCIUM 8.3* 8.4* 8.4*     Recent Labs     24   APTT 29.6   INR 0.92           Intake/Output                         24 0700 - 24 0659 24 - 24 0659      0373-1884 Total  Total                 Intake    P.O.  600  240 840  --  -- --    P.O. 600 240 840 -- -- --    Total  Intake 600 240 840 -- -- --       Output    Urine  600  -- 600  --  -- --    Number of Times Voided 2 x 1 x 3 x -- -- --    Urine Void (mL) 600 -- 600 -- -- --    Total Output 600 -- 600 -- -- --       Net I/O     0 240 240 -- -- --              Intake/Output Summary (Last 24 hours) at 6/2/2024 0955  Last data filed at 6/1/2024 2024  Gross per 24 hour   Intake 840 ml   Output 600 ml   Net 240 ml             Pharmacy Consult Request  1 Each PHARMACY TO DOSE    oxyCODONE immediate-release  5 mg Q3HRS PRN    Or    oxyCODONE immediate-release  10 mg Q3HRS PRN    Or    HYDROmorphone  0.5 mg Q3HRS PRN    lidocaine  2 Patch Q24HR    venlafaxine XR  75 mg QDAY with Breakfast    cyanocobalamin  1,000 mcg DAILY    vitamin D3  1,000 Units DAILY    melatonin  5 mg HS PRN    acetaminophen  650 mg Q6HRS PRN    ondansetron  4 mg Q4HRS PRN    ondansetron  4 mg Q4HRS PRN    promethazine  12.5-25 mg Q4HRS PRN    promethazine  12.5-25 mg Q4HRS PRN    prochlorperazine  5-10 mg Q4HRS PRN    insulin regular  2-9 Units 4X/DAY ACHS    And    dextrose bolus  25 g Q15 MIN PRN    aspirin  81 mg Q EVENING    metoclopramide  10 mg TID PRN    omeprazole  20 mg BID    cefTRIAXone (ROCEPHIN) IV  2,000 mg Q EVENING    doxycycline monohydrate  100 mg Q12HRS    insulin GLARGINE  25 Units BID       Assessment and Plan:  Hospital day # 7  Flexion/extension x-rays completed and reviewed  Cervical MRI completed and reviewed  Discussed plan of care with Dr. Pulido who is recommendinga  redo L5 and L4 laminectomy with possible stealth fusion 6/4/24 (Tuesday).  Will appreciate medical risk stratification/medical clearance for a two hour prone procedure Tuesday.   Following    Chemical prophylactic DVT therapy: Yes - Lovenox 40mg/qd    Start date/time: okay from NS perspective until surgery 6/4     Brain Compression: No

## 2024-06-03 LAB
GLUCOSE BLD STRIP.AUTO-MCNC: 108 MG/DL (ref 65–99)
GLUCOSE BLD STRIP.AUTO-MCNC: 135 MG/DL (ref 65–99)
GLUCOSE BLD STRIP.AUTO-MCNC: 143 MG/DL (ref 65–99)
GLUCOSE BLD STRIP.AUTO-MCNC: 178 MG/DL (ref 65–99)

## 2024-06-03 PROCEDURE — 700102 HCHG RX REV CODE 250 W/ 637 OVERRIDE(OP): Performed by: HOSPITALIST

## 2024-06-03 PROCEDURE — 770006 HCHG ROOM/CARE - MED/SURG/GYN SEMI*

## 2024-06-03 PROCEDURE — A9270 NON-COVERED ITEM OR SERVICE: HCPCS | Performed by: INTERNAL MEDICINE

## 2024-06-03 PROCEDURE — 700101 HCHG RX REV CODE 250

## 2024-06-03 PROCEDURE — A9270 NON-COVERED ITEM OR SERVICE: HCPCS

## 2024-06-03 PROCEDURE — A9270 NON-COVERED ITEM OR SERVICE: HCPCS | Performed by: HOSPITALIST

## 2024-06-03 PROCEDURE — 700111 HCHG RX REV CODE 636 W/ 250 OVERRIDE (IP)

## 2024-06-03 PROCEDURE — 99232 SBSQ HOSP IP/OBS MODERATE 35: CPT | Performed by: INTERNAL MEDICINE

## 2024-06-03 PROCEDURE — 700102 HCHG RX REV CODE 250 W/ 637 OVERRIDE(OP)

## 2024-06-03 PROCEDURE — 700102 HCHG RX REV CODE 250 W/ 637 OVERRIDE(OP): Performed by: INTERNAL MEDICINE

## 2024-06-03 PROCEDURE — 82962 GLUCOSE BLOOD TEST: CPT

## 2024-06-03 RX ADMIN — HYDROMORPHONE HYDROCHLORIDE 0.5 MG: 1 INJECTION, SOLUTION INTRAMUSCULAR; INTRAVENOUS; SUBCUTANEOUS at 04:54

## 2024-06-03 RX ADMIN — ASPIRIN 81 MG: 81 TABLET, COATED ORAL at 17:37

## 2024-06-03 RX ADMIN — VENLAFAXINE HYDROCHLORIDE 75 MG: 75 CAPSULE, EXTENDED RELEASE ORAL at 07:53

## 2024-06-03 RX ADMIN — Medication 1000 UNITS: at 04:54

## 2024-06-03 RX ADMIN — DOXYCYCLINE 100 MG: 100 TABLET, FILM COATED ORAL at 04:54

## 2024-06-03 RX ADMIN — HYDROMORPHONE HYDROCHLORIDE 0.5 MG: 1 INJECTION, SOLUTION INTRAMUSCULAR; INTRAVENOUS; SUBCUTANEOUS at 15:13

## 2024-06-03 RX ADMIN — OXYCODONE HYDROCHLORIDE 10 MG: 10 TABLET ORAL at 14:13

## 2024-06-03 RX ADMIN — CYANOCOBALAMIN TAB 500 MCG 1000 MCG: 500 TAB at 04:54

## 2024-06-03 RX ADMIN — OXYCODONE HYDROCHLORIDE 10 MG: 10 TABLET ORAL at 02:26

## 2024-06-03 RX ADMIN — INSULIN HUMAN 2 UNITS: 100 INJECTION, SOLUTION PARENTERAL at 21:02

## 2024-06-03 RX ADMIN — OMEPRAZOLE 20 MG: 20 CAPSULE, DELAYED RELEASE ORAL at 17:37

## 2024-06-03 RX ADMIN — OXYCODONE HYDROCHLORIDE 5 MG: 5 TABLET ORAL at 21:01

## 2024-06-03 RX ADMIN — OMEPRAZOLE 20 MG: 20 CAPSULE, DELAYED RELEASE ORAL at 04:54

## 2024-06-03 RX ADMIN — LIDOCAINE 2 PATCH: 4 PATCH TOPICAL at 02:27

## 2024-06-03 RX ADMIN — DOXYCYCLINE 100 MG: 100 TABLET, FILM COATED ORAL at 17:37

## 2024-06-03 ASSESSMENT — ENCOUNTER SYMPTOMS
BLOOD IN STOOL: 0
INSOMNIA: 0
CHILLS: 0
FEVER: 0
WEAKNESS: 0
NAUSEA: 0
DIZZINESS: 0
COUGH: 0
SEIZURES: 0
CONSTIPATION: 0
LOSS OF CONSCIOUSNESS: 0
HEADACHES: 0
FALLS: 0
DEPRESSION: 1
EYE PAIN: 0
NERVOUS/ANXIOUS: 0
DIARRHEA: 0
BACK PAIN: 1
MYALGIAS: 1
FOCAL WEAKNESS: 0
PALPITATIONS: 0
TREMORS: 0
ABDOMINAL PAIN: 0
VOMITING: 0
EYE REDNESS: 0
WHEEZING: 0
SHORTNESS OF BREATH: 0
HEMOPTYSIS: 0

## 2024-06-03 ASSESSMENT — PAIN DESCRIPTION - PAIN TYPE
TYPE: ACUTE PAIN

## 2024-06-03 NOTE — CARE PLAN
The patient is Stable - Low risk of patient condition declining or worsening    Shift Goals  Clinical Goals: Pain management, monitor BG  Patient Goals: Rest  Family Goals: vicente    Progress made toward(s) clinical / shift goals:  Patient alert and oriented, assessment completed. Patient c/o pain, administered medications per MAR. Patient's B, administered 2 units of insulin. Patient resting comfortably, bed in lowest position, call light within reach.      Problem: Pain - Standard  Goal: Alleviation of pain or a reduction in pain to the patient’s comfort goal  Outcome: Progressing     Problem: Knowledge Deficit - Standard  Goal: Patient and family/care givers will demonstrate understanding of plan of care, disease process/condition, diagnostic tests and medications  Outcome: Progressing     Problem: Fall Risk  Goal: Patient will remain free from falls  Outcome: Progressing       Patient is not progressing towards the following goals:

## 2024-06-03 NOTE — PROGRESS NOTES
Hospital Medicine Daily Progress Note    Date of Service  6/3/2024    Chief Complaint  Christoph Taylor is a 58 y.o. male admitted 5/27/2024 with Back Pain and Suicidal Ideation        Hospital Course  No notes on file    Interval Problem Update  I reviewed the chart along with vitals, labs, imaging, test (both pending and resulted) and recommendations from specialists and interdisciplinary team.  Managing pneumonia and uncontrolled diabetes  BG 300s initially now improved wityh increased Lantus  Bld cx pending.  Known incontinence and diaper use for months and drop foot with back pain. sees [facility] MRI came back showing sveere lumbar stenosis with cauda equina  R foot XR reviewed chronic degenerative changes and flat foot, he can follow outpatient Orthopedics. Ordered AFO     Patient has complained mainly about foot, denies Charcot, as well as months of fecal incontinence and back pain. Explained diabetes and CT results to him. Patient otherwise stable. More improtantly I discussed with LULA Shi. Patient was seen at [facility] last year. He has had fott drop and incontinence for months. After discussion, he has agreed to laminectomy and decompression. Standing XRs ordered finally done.  Discussed with Psychiatry. Adjusting antidepressants. Updated and explained plan to him.   MR cervical:  1.  Multilevel multifactorial degenerative changes  2.  High-grade central canal narrowing at C4-C5, C5-C6 and C6-C7 with findings suspicious for myelomalacia at C4-C6  3.  Other areas of central canal and neural foraminal narrowing as described above  If necessary laminectomy for both C and L spines planned concomitantly.    Patient resting, pain better controlled. Still in good mood. Happy that procedure scheduled TUESDAY for cervical surgery and FRIDAY for lumbar surgery. PT recommends SNF        I have discussed this patient's plan of care and discharge plan at IDT rounds today with Case Management, Nursing,  Nursing leadership, and other members of the IDT team.    Consultants/Specialty      Code Status  Full Code    Disposition  Medically Cleared  I have placed the appropriate orders for post-discharge needs.    Review of Systems  Review of Systems   Constitutional:  Negative for chills and fever.   HENT:  Negative for congestion, hearing loss and nosebleeds.    Eyes:  Negative for pain and redness.   Respiratory:  Negative for cough, hemoptysis, shortness of breath and wheezing.    Cardiovascular:  Negative for chest pain and palpitations.   Gastrointestinal:  Negative for abdominal pain, blood in stool, constipation, diarrhea, nausea and vomiting.   Genitourinary:  Negative for dysuria, frequency and hematuria.   Musculoskeletal:  Positive for back pain, joint pain and myalgias. Negative for falls.   Skin:  Negative for rash.   Neurological:  Negative for dizziness, tremors, focal weakness, seizures, loss of consciousness, weakness and headaches.   Psychiatric/Behavioral:  Positive for depression. The patient is not nervous/anxious and does not have insomnia.    All other systems reviewed and are negative.       Physical Exam  Temp:  [36.3 °C (97.4 °F)-36.5 °C (97.7 °F)] 36.5 °C (97.7 °F)  Pulse:  [80-96] 80  Resp:  [16-18] 18  BP: ()/(57-83) 102/63  SpO2:  [90 %-91 %] 90 %    Physical Exam  Vitals and nursing note reviewed.   HENT:      Head: Normocephalic and atraumatic.      Right Ear: External ear normal.      Left Ear: External ear normal.      Nose: Nose normal.      Mouth/Throat:      Mouth: Mucous membranes are moist.   Eyes:      General: No scleral icterus.     Conjunctiva/sclera: Conjunctivae normal.   Cardiovascular:      Rate and Rhythm: Normal rate and regular rhythm.      Heart sounds: No murmur heard.     No friction rub. No gallop.   Pulmonary:      Effort: Pulmonary effort is normal.      Breath sounds: Normal breath sounds.   Abdominal:      General: Abdomen is flat. Bowel sounds are normal.  There is no distension.      Palpations: Abdomen is soft.      Tenderness: There is no abdominal tenderness. There is no guarding.   Musculoskeletal:         General: Tenderness (myalgia,back pain) present. Normal range of motion.      Cervical back: Normal range of motion and neck supple.   Skin:     General: Skin is warm.   Neurological:      Mental Status: He is alert and oriented to person, place, and time. Mental status is at baseline.      Motor: Weakness present.   Psychiatric:         Mood and Affect: Mood normal.         Behavior: Behavior normal.         Thought Content: Thought content normal.         Judgment: Judgment normal.         Fluids    Intake/Output Summary (Last 24 hours) at 6/3/2024 1657  Last data filed at 6/3/2024 0226  Gross per 24 hour   Intake 360 ml   Output 500 ml   Net -140 ml       Laboratory  Recent Labs     06/01/24  0251   WBC 4.7*   RBC 3.83*   HEMOGLOBIN 10.9*   HEMATOCRIT 32.2*   MCV 84.1   MCH 28.5   MCHC 33.9   RDW 41.3   PLATELETCT 206   MPV 8.8*     Recent Labs     06/01/24  0251 06/02/24  0415   SODIUM 139 142   POTASSIUM 4.3 4.4   CHLORIDE 104 103   CO2 26 28   GLUCOSE 128* 87   BUN 26* 25*   CREATININE 0.51 0.51   CALCIUM 8.4* 8.4*                     Imaging  MR-CERVICAL SPINE-W/O   Final Result      1.  Multilevel multifactorial degenerative changes   2.  High-grade central canal narrowing at C4-C5, C5-C6 and C6-C7 with findings suspicious for myelomalacia at C4-C6   3.  Other areas of central canal and neural foraminal narrowing as described above      DX-LUMBAR SPINE-BEND OR FLEX-EXT   Final Result      L1-L4 degenerative retrolisthesis without definite significant hypermobility.      DX-LUMBAR SPINE-2 OR 3 VIEWS   Final Result      1.  Multilevel degenerative changes of the lumbar spine.   2.  Mild grade 1 retrolisthesis visible with extension maneuver at L2-3 and L3-4 which could indicate hypermobility.      MR-THORACIC SPINE-WITH & W/O   Final Result         Remote  endplate compression fractures at T1, T2, T12 without significant posterior cortical retropulsion or canal stenosis.      Mild degenerative changes of the thoracic spine without significant canal stenosis or foraminal narrowing.         MR-LUMBAR SPINE-WITH & W/O   Final Result         Severe canal stenosis at L4-5 with cauda equina compression. There is also severe right foraminal narrowing.      Postoperative changes at L5-S1 with a well decompressed canal.      Remote compression fracture involving the superior endplate of T12 with 50% loss of height.      DX-FOOT-2- LEFT   Final Result      No acute osseous abnormality.      Prominent degenerative changes within the mid and hindfoot with pes planus type deformity.      CT-LSPINE W/O PLUS RECONS   Final Result         1.  No acute traumatic bony injury of the lumbar spine.      CT-TSPINE W/O PLUS RECONS   Final Result         1.  No acute traumatic bony injury of the thoracic spine.      CT-CHEST,ABDOMEN,PELVIS WITH   Final Result           Assessment/Plan  * Pneumonia due to infectious organism- (present on admission)  Assessment & Plan  Bilateral infiltrates on CT scan  Follow-up blood cultures and sputum cultures  Start ceftriaxone and doxycycline    Cauda equina compression (HCC)  Assessment & Plan  Decompression and laminectomy tentative Tu  MR cervical results reviewed, if necessary will also do surgery on the C-spine concomitantly    Lumbar stenosis  Assessment & Plan  Decompression and laminectomy tentative next week    Fall  Assessment & Plan  Patient did not lose consciousness or hit his head  Complains of neck pain, back pain and hip pain  PT OT eval    Acute respiratory failure with hypoxia (HCC)- (present on admission)  Assessment & Plan  On 3 L of O2 above baseline  Weaning down to baseline    Thoracic back pain- (present on admission)  Assessment & Plan  Acute on chronic back pain  CT scans are negative  Straight leg test negative    Ordered MRI  back reviewed results  Redo back surgery tentative tuesday    Diabetes mellitus type 2, insulin dependent, poorly controlled with marked hyperglycemia (HCC)- (present on admission)  Assessment & Plan  Start on insulin sliding scale with serial Accu-Checks  Check hemoglobin A1c  Hypoglycemic protocol in place  Increased Lantus, uncontrolled   Improved    Acute esophagitis- (present on admission)  Assessment & Plan  Continue Protonix         VTE prophylaxis: SCDs    I have performed a physical exam and reviewed and updated ROS and Plan today (6/3/2024). In review of yesterday's note (6/2/2024), there are no changes except as documented above.

## 2024-06-03 NOTE — THERAPY
Occupational Therapy Contact Note    Patient Name: Christoph Taylor  Age:  59 y.o., Sex:  male  Medical Record #: [id number]  Today's Date: 6/3/2024    Per chart, pt is scheduled for surgical intervention tomorrow for redo L4-5 laminectomy and possible fusion. Will follow-up post-op as able/appropriate.     JADA Puentes, OTR/L     nausea with dry heaving, slight spit up

## 2024-06-03 NOTE — CARE PLAN
Problem: Pain - Standard  Goal: Alleviation of pain or a reduction in pain to the patient’s comfort goal  Outcome: Progressing     Problem: Knowledge Deficit - Standard  Goal: Patient and family/care givers will demonstrate understanding of plan of care, disease process/condition, diagnostic tests and medications  Outcome: Progressing     Problem: Skin Integrity  Goal: Skin integrity is maintained or improved  Outcome: Progressing   The patient is Stable - Low risk of patient condition declining or worsening    Shift Goals  Clinical Goals: Pain management, monitor BG  Patient Goals: Rest  Family Goals: vicente    Progress made toward(s) clinical / shift goals:  Pain management, rest, pending surgery 6/4/24.    Patient is not progressing towards the following goals:

## 2024-06-03 NOTE — PROGRESS NOTES
Neurosurgery Progress Note    Subjective:  57 yo man presented with a fall about one week ago with resultant increased right foot weakness. MRI shows critical L4-5 spondylosis and stenosis. Has a history of a prior L5-S1 onlay fusion 30 years ago.     Reporting back pain and right greater than left lower extremity radiculopathy.   Right foot weakness requiring AFO brace. Has bene having loss of bowels for several months.    Lumbar x-rays  show multilevel spondylosis with grade 1 retrolisthesis of L2 on L3 and L3 on L4. No dynamic instability.  Cervical MRI  shows multilevel severe central canal stenosis C4-7 with possible cord signal change C4-6.    Currently being treated for pneumonia and uncontrolled diabetes.       Exam:  Bilateral interossei wasting, 4+/5 left deltoid, 3/5 bilateral triceps, 4-/5 bilateral interossei.  Motor right EHL/AT 4/5, limited due to flat foot on the left with 4/5, othwerise 5/5 in bilateral lower extremities.   Sensation intact except distally in the lower extremities    BP  Min: 96/63  Max: 136/83  Pulse  Av.3  Min: 86  Max: 96  Resp  Av.6  Min: 16  Max: 18  Temp  Av.4 °C (97.5 °F)  Min: 36.3 °C (97.4 °F)  Max: 36.4 °C (97.6 °F)  SpO2  Av.3 %  Min: 91 %  Max: 92 %    No data recorded    Recent Labs     24  0251   WBC 4.7*   RBC 3.83*   HEMOGLOBIN 10.9*   HEMATOCRIT 32.2*   MCV 84.1   MCH 28.5   MCHC 33.9   RDW 41.3   PLATELETCT 206   MPV 8.8*     Recent Labs     24  0251 24  0415   SODIUM 139 142   POTASSIUM 4.3 4.4   CHLORIDE 104 103   CO2 26 28   GLUCOSE 128* 87   BUN 26* 25*   CREATININE 0.51 0.51   CALCIUM 8.4* 8.4*                 Intake/Output                         24 - 2459 24 - 24 06 Total  Total                 Intake    P.O.  600  360 960  --  -- --    P.O. 600 360 960 -- -- --    Total Intake 600 360 960 -- -- --       Output    Urine  --  226 819   --  -- --    Number of Times Voided -- 1 x 1 x -- -- --    Urine Void (mL) -- 500 500 -- -- --    Total Output -- 500 500 -- -- --       Net I/O     600 -140 460 -- -- --              Intake/Output Summary (Last 24 hours) at 6/3/2024 1001  Last data filed at 6/3/2024 0226  Gross per 24 hour   Intake 720 ml   Output 500 ml   Net 220 ml             Pharmacy Consult Request  1 Each PHARMACY TO DOSE    oxyCODONE immediate-release  5 mg Q3HRS PRN    Or    oxyCODONE immediate-release  10 mg Q3HRS PRN    Or    HYDROmorphone  0.5 mg Q3HRS PRN    lidocaine  2 Patch Q24HR    venlafaxine XR  75 mg QDAY with Breakfast    cyanocobalamin  1,000 mcg DAILY    vitamin D3  1,000 Units DAILY    melatonin  5 mg HS PRN    acetaminophen  650 mg Q6HRS PRN    ondansetron  4 mg Q4HRS PRN    ondansetron  4 mg Q4HRS PRN    promethazine  12.5-25 mg Q4HRS PRN    promethazine  12.5-25 mg Q4HRS PRN    prochlorperazine  5-10 mg Q4HRS PRN    insulin regular  2-9 Units 4X/DAY ACHS    And    dextrose bolus  25 g Q15 MIN PRN    aspirin  81 mg Q EVENING    metoclopramide  10 mg TID PRN    omeprazole  20 mg BID    doxycycline monohydrate  100 mg Q12HRS    insulin GLARGINE  25 Units BID       Assessment and Plan:  Hospital day # 8  Flexion/extension x-rays completed and reviewed  Cervical MRI completed and reviewed  Discussed plan of care with Dr. Pulido who is recommending a  redo L5 and L4 laminectomy with possible stealth fusion  to be postponed until Friday.  He will need C4-7 ACDF on 6/4/24.  NPO at midnight tonight, ok for sips with meds.   Will appreciate medical risk stratification/medical clearance for a two hour prone procedure Tuesday.   Following    Chemical prophylactic DVT therapy: Yes - Lovenox 40mg/qd    Start date/time: Hold for surgery on 6/4.      Brain Compression: No

## 2024-06-03 NOTE — THERAPY
06/03/24 0743   Interdisciplinary Plan of Care Collaboration   Collaboration Comments Hold PT treatment, pt pending sx.

## 2024-06-04 ENCOUNTER — APPOINTMENT (OUTPATIENT)
Dept: RADIOLOGY | Facility: MEDICAL CENTER | Age: 59
DRG: 459 | End: 2024-06-04
Attending: NEUROLOGICAL SURGERY
Payer: COMMERCIAL

## 2024-06-04 ENCOUNTER — ANESTHESIA (OUTPATIENT)
Dept: SURGERY | Facility: MEDICAL CENTER | Age: 59
DRG: 459 | End: 2024-06-04
Payer: COMMERCIAL

## 2024-06-04 ENCOUNTER — ANESTHESIA EVENT (OUTPATIENT)
Dept: SURGERY | Facility: MEDICAL CENTER | Age: 59
DRG: 459 | End: 2024-06-04
Payer: COMMERCIAL

## 2024-06-04 PROBLEM — M48.02 CERVICAL STENOSIS OF SPINAL CANAL: Status: ACTIVE | Noted: 2024-05-30

## 2024-06-04 PROBLEM — N18.9 CHRONIC RENAL INSUFFICIENCY: Status: ACTIVE | Noted: 2024-06-04

## 2024-06-04 LAB
ALBUMIN SERPL BCP-MCNC: 3.2 G/DL (ref 3.2–4.9)
BUN SERPL-MCNC: 27 MG/DL (ref 8–22)
CALCIUM ALBUM COR SERPL-MCNC: 8.9 MG/DL (ref 8.5–10.5)
CALCIUM SERPL-MCNC: 8.3 MG/DL (ref 8.5–10.5)
CHLORIDE SERPL-SCNC: 104 MMOL/L (ref 96–112)
CO2 SERPL-SCNC: 30 MMOL/L (ref 20–33)
CREAT SERPL-MCNC: 0.74 MG/DL (ref 0.5–1.4)
ERYTHROCYTE [DISTWIDTH] IN BLOOD BY AUTOMATED COUNT: 44.1 FL (ref 35.9–50)
GFR SERPLBLD CREATININE-BSD FMLA CKD-EPI: 104 ML/MIN/1.73 M 2
GLUCOSE BLD STRIP.AUTO-MCNC: 149 MG/DL (ref 65–99)
GLUCOSE BLD STRIP.AUTO-MCNC: 151 MG/DL (ref 65–99)
GLUCOSE BLD STRIP.AUTO-MCNC: 152 MG/DL (ref 65–99)
GLUCOSE BLD STRIP.AUTO-MCNC: 190 MG/DL (ref 65–99)
GLUCOSE SERPL-MCNC: 152 MG/DL (ref 65–99)
HCT VFR BLD AUTO: 34.5 % (ref 42–52)
HGB BLD-MCNC: 11 G/DL (ref 14–18)
MCH RBC QN AUTO: 28.2 PG (ref 27–33)
MCHC RBC AUTO-ENTMCNC: 31.9 G/DL (ref 32.3–36.5)
MCV RBC AUTO: 88.5 FL (ref 81.4–97.8)
PHOSPHATE SERPL-MCNC: 4 MG/DL (ref 2.5–4.5)
PLATELET # BLD AUTO: 231 K/UL (ref 164–446)
PMV BLD AUTO: 8.9 FL (ref 9–12.9)
POTASSIUM SERPL-SCNC: 4.2 MMOL/L (ref 3.6–5.5)
RBC # BLD AUTO: 3.9 M/UL (ref 4.7–6.1)
SODIUM SERPL-SCNC: 142 MMOL/L (ref 135–145)
WBC # BLD AUTO: 5.6 K/UL (ref 4.8–10.8)

## 2024-06-04 PROCEDURE — 95868 NDL EMG CRANIAL NRV MUSC BI: CPT | Performed by: NEUROLOGICAL SURGERY

## 2024-06-04 PROCEDURE — C1713 ANCHOR/SCREW BN/BN,TIS/BN: HCPCS | Performed by: NEUROLOGICAL SURGERY

## 2024-06-04 PROCEDURE — 160029 HCHG SURGERY MINUTES - 1ST 30 MINS LEVEL 4: Performed by: NEUROLOGICAL SURGERY

## 2024-06-04 PROCEDURE — 95861 NEEDLE EMG 2 EXTREMITIES: CPT | Performed by: NEUROLOGICAL SURGERY

## 2024-06-04 PROCEDURE — 160048 HCHG OR STATISTICAL LEVEL 1-5: Performed by: NEUROLOGICAL SURGERY

## 2024-06-04 PROCEDURE — A9270 NON-COVERED ITEM OR SERVICE: HCPCS

## 2024-06-04 PROCEDURE — 700111 HCHG RX REV CODE 636 W/ 250 OVERRIDE (IP): Performed by: NEUROLOGICAL SURGERY

## 2024-06-04 PROCEDURE — 700101 HCHG RX REV CODE 250

## 2024-06-04 PROCEDURE — 36415 COLL VENOUS BLD VENIPUNCTURE: CPT

## 2024-06-04 PROCEDURE — 700111 HCHG RX REV CODE 636 W/ 250 OVERRIDE (IP): Performed by: ANESTHESIOLOGY

## 2024-06-04 PROCEDURE — 700102 HCHG RX REV CODE 250 W/ 637 OVERRIDE(OP)

## 2024-06-04 PROCEDURE — A9270 NON-COVERED ITEM OR SERVICE: HCPCS | Performed by: ANESTHESIOLOGY

## 2024-06-04 PROCEDURE — 95939 C MOTOR EVOKED UPR&LWR LIMBS: CPT | Performed by: NEUROLOGICAL SURGERY

## 2024-06-04 PROCEDURE — 700101 HCHG RX REV CODE 250: Performed by: NEUROLOGICAL SURGERY

## 2024-06-04 PROCEDURE — 700105 HCHG RX REV CODE 258: Performed by: ANESTHESIOLOGY

## 2024-06-04 PROCEDURE — 160036 HCHG PACU - EA ADDL 30 MINS PHASE I: Performed by: NEUROLOGICAL SURGERY

## 2024-06-04 PROCEDURE — 95865 NEEDLE EMG LARYNX: CPT | Performed by: NEUROLOGICAL SURGERY

## 2024-06-04 PROCEDURE — 700102 HCHG RX REV CODE 250 W/ 637 OVERRIDE(OP): Performed by: ANESTHESIOLOGY

## 2024-06-04 PROCEDURE — 0RG20A0 FUSION OF 2 OR MORE CERVICAL VERTEBRAL JOINTS WITH INTERBODY FUSION DEVICE, ANTERIOR APPROACH, ANTERIOR COLUMN, OPEN APPROACH: ICD-10-PCS | Performed by: NEUROLOGICAL SURGERY

## 2024-06-04 PROCEDURE — 770006 HCHG ROOM/CARE - MED/SURG/GYN SEMI*

## 2024-06-04 PROCEDURE — 160041 HCHG SURGERY MINUTES - EA ADDL 1 MIN LEVEL 4: Performed by: NEUROLOGICAL SURGERY

## 2024-06-04 PROCEDURE — 85027 COMPLETE CBC AUTOMATED: CPT

## 2024-06-04 PROCEDURE — 95940 IONM IN OPERATNG ROOM 15 MIN: CPT | Performed by: NEUROLOGICAL SURGERY

## 2024-06-04 PROCEDURE — 502000 HCHG MISC OR IMPLANTS RC 0278: Performed by: NEUROLOGICAL SURGERY

## 2024-06-04 PROCEDURE — 700101 HCHG RX REV CODE 250: Performed by: ANESTHESIOLOGY

## 2024-06-04 PROCEDURE — 700102 HCHG RX REV CODE 250 W/ 637 OVERRIDE(OP): Performed by: HOSPITALIST

## 2024-06-04 PROCEDURE — 95937 NEUROMUSCULAR JUNCTION TEST: CPT | Performed by: NEUROLOGICAL SURGERY

## 2024-06-04 PROCEDURE — 72040 X-RAY EXAM NECK SPINE 2-3 VW: CPT

## 2024-06-04 PROCEDURE — 0RT30ZZ RESECTION OF CERVICAL VERTEBRAL DISC, OPEN APPROACH: ICD-10-PCS | Performed by: NEUROLOGICAL SURGERY

## 2024-06-04 PROCEDURE — A9270 NON-COVERED ITEM OR SERVICE: HCPCS | Performed by: HOSPITALIST

## 2024-06-04 PROCEDURE — 95955 EEG DURING SURGERY: CPT | Performed by: NEUROLOGICAL SURGERY

## 2024-06-04 PROCEDURE — 700102 HCHG RX REV CODE 250 W/ 637 OVERRIDE(OP): Performed by: INTERNAL MEDICINE

## 2024-06-04 PROCEDURE — 160035 HCHG PACU - 1ST 60 MINS PHASE I: Performed by: NEUROLOGICAL SURGERY

## 2024-06-04 PROCEDURE — 700111 HCHG RX REV CODE 636 W/ 250 OVERRIDE (IP)

## 2024-06-04 PROCEDURE — 110371 HCHG SHELL REV 272: Performed by: NEUROLOGICAL SURGERY

## 2024-06-04 PROCEDURE — 95938 SOMATOSENSORY TESTING: CPT | Performed by: NEUROLOGICAL SURGERY

## 2024-06-04 PROCEDURE — 82962 GLUCOSE BLOOD TEST: CPT

## 2024-06-04 PROCEDURE — 110454 HCHG SHELL REV 250: Performed by: NEUROLOGICAL SURGERY

## 2024-06-04 PROCEDURE — 80069 RENAL FUNCTION PANEL: CPT

## 2024-06-04 PROCEDURE — 160002 HCHG RECOVERY MINUTES (STAT): Performed by: NEUROLOGICAL SURGERY

## 2024-06-04 PROCEDURE — 00NW0ZZ RELEASE CERVICAL SPINAL CORD, OPEN APPROACH: ICD-10-PCS | Performed by: NEUROLOGICAL SURGERY

## 2024-06-04 PROCEDURE — 99232 SBSQ HOSP IP/OBS MODERATE 35: CPT | Performed by: HOSPITALIST

## 2024-06-04 PROCEDURE — 160009 HCHG ANES TIME/MIN: Performed by: NEUROLOGICAL SURGERY

## 2024-06-04 PROCEDURE — A9270 NON-COVERED ITEM OR SERVICE: HCPCS | Performed by: INTERNAL MEDICINE

## 2024-06-04 PROCEDURE — 502240 HCHG MISC OR SUPPLY RC 0272: Performed by: NEUROLOGICAL SURGERY

## 2024-06-04 DEVICE — IMPLANTABLE DEVICE: Type: IMPLANTABLE DEVICE | Site: SPINE CERVICAL | Status: FUNCTIONAL

## 2024-06-04 DEVICE — SCREW 4.0MM X 14MM VARIABLE ANGLE SELF TAPPING (1EA): Type: IMPLANTABLE DEVICE | Site: SPINE CERVICAL | Status: FUNCTIONAL

## 2024-06-04 RX ORDER — HALOPERIDOL 5 MG/ML
1 INJECTION INTRAMUSCULAR
Status: DISCONTINUED | OUTPATIENT
Start: 2024-06-04 | End: 2024-06-04 | Stop reason: HOSPADM

## 2024-06-04 RX ORDER — AMOXICILLIN 250 MG
1 CAPSULE ORAL
Status: DISCONTINUED | OUTPATIENT
Start: 2024-06-04 | End: 2024-06-11 | Stop reason: HOSPADM

## 2024-06-04 RX ORDER — ONDANSETRON 2 MG/ML
4 INJECTION INTRAMUSCULAR; INTRAVENOUS EVERY 4 HOURS PRN
Status: DISCONTINUED | OUTPATIENT
Start: 2024-06-04 | End: 2024-06-11 | Stop reason: HOSPADM

## 2024-06-04 RX ORDER — IPRATROPIUM BROMIDE AND ALBUTEROL SULFATE 2.5; .5 MG/3ML; MG/3ML
3 SOLUTION RESPIRATORY (INHALATION)
Status: DISCONTINUED | OUTPATIENT
Start: 2024-06-04 | End: 2024-06-04 | Stop reason: HOSPADM

## 2024-06-04 RX ORDER — CEFAZOLIN SODIUM 1 G/3ML
INJECTION, POWDER, FOR SOLUTION INTRAMUSCULAR; INTRAVENOUS PRN
Status: DISCONTINUED | OUTPATIENT
Start: 2024-06-04 | End: 2024-06-04 | Stop reason: SURG

## 2024-06-04 RX ORDER — PROMETHAZINE HYDROCHLORIDE 12.5 MG/1
12.5-25 SUPPOSITORY RECTAL EVERY 4 HOURS PRN
Status: DISCONTINUED | OUTPATIENT
Start: 2024-06-04 | End: 2024-06-11 | Stop reason: HOSPADM

## 2024-06-04 RX ORDER — LIDOCAINE HYDROCHLORIDE 20 MG/ML
INJECTION, SOLUTION EPIDURAL; INFILTRATION; INTRACAUDAL; PERINEURAL PRN
Status: DISCONTINUED | OUTPATIENT
Start: 2024-06-04 | End: 2024-06-04 | Stop reason: SURG

## 2024-06-04 RX ORDER — DEXAMETHASONE SODIUM PHOSPHATE 4 MG/ML
INJECTION, SOLUTION INTRA-ARTICULAR; INTRALESIONAL; INTRAMUSCULAR; INTRAVENOUS; SOFT TISSUE PRN
Status: DISCONTINUED | OUTPATIENT
Start: 2024-06-04 | End: 2024-06-04 | Stop reason: SURG

## 2024-06-04 RX ORDER — HYDRALAZINE HYDROCHLORIDE 20 MG/ML
5 INJECTION INTRAMUSCULAR; INTRAVENOUS
Status: DISCONTINUED | OUTPATIENT
Start: 2024-06-04 | End: 2024-06-04 | Stop reason: HOSPADM

## 2024-06-04 RX ORDER — SODIUM CHLORIDE, SODIUM LACTATE, POTASSIUM CHLORIDE, CALCIUM CHLORIDE 600; 310; 30; 20 MG/100ML; MG/100ML; MG/100ML; MG/100ML
INJECTION, SOLUTION INTRAVENOUS
Status: DISCONTINUED | OUTPATIENT
Start: 2024-06-04 | End: 2024-06-04 | Stop reason: SURG

## 2024-06-04 RX ORDER — ONDANSETRON 2 MG/ML
4 INJECTION INTRAMUSCULAR; INTRAVENOUS
Status: DISCONTINUED | OUTPATIENT
Start: 2024-06-04 | End: 2024-06-04 | Stop reason: HOSPADM

## 2024-06-04 RX ORDER — HYDROMORPHONE HYDROCHLORIDE 2 MG/ML
INJECTION, SOLUTION INTRAMUSCULAR; INTRAVENOUS; SUBCUTANEOUS PRN
Status: DISCONTINUED | OUTPATIENT
Start: 2024-06-04 | End: 2024-06-04 | Stop reason: SURG

## 2024-06-04 RX ORDER — DIPHENHYDRAMINE HYDROCHLORIDE 50 MG/ML
25 INJECTION INTRAMUSCULAR; INTRAVENOUS EVERY 6 HOURS PRN
Status: DISCONTINUED | OUTPATIENT
Start: 2024-06-04 | End: 2024-06-11 | Stop reason: HOSPADM

## 2024-06-04 RX ORDER — DOCUSATE SODIUM 100 MG/1
100 CAPSULE, LIQUID FILLED ORAL 2 TIMES DAILY
Status: DISCONTINUED | OUTPATIENT
Start: 2024-06-04 | End: 2024-06-11 | Stop reason: HOSPADM

## 2024-06-04 RX ORDER — OXYCODONE HCL 5 MG/5 ML
10 SOLUTION, ORAL ORAL
Status: COMPLETED | OUTPATIENT
Start: 2024-06-04 | End: 2024-06-04

## 2024-06-04 RX ORDER — DIPHENHYDRAMINE HCL 25 MG
25 TABLET ORAL EVERY 6 HOURS PRN
Status: DISCONTINUED | OUTPATIENT
Start: 2024-06-04 | End: 2024-06-11 | Stop reason: HOSPADM

## 2024-06-04 RX ORDER — ENOXAPARIN SODIUM 100 MG/ML
40 INJECTION SUBCUTANEOUS DAILY
Status: DISCONTINUED | OUTPATIENT
Start: 2024-06-05 | End: 2024-06-09

## 2024-06-04 RX ORDER — ONDANSETRON 4 MG/1
4 TABLET, ORALLY DISINTEGRATING ORAL EVERY 4 HOURS PRN
Status: DISCONTINUED | OUTPATIENT
Start: 2024-06-04 | End: 2024-06-11 | Stop reason: HOSPADM

## 2024-06-04 RX ORDER — ONDANSETRON 2 MG/ML
INJECTION INTRAMUSCULAR; INTRAVENOUS PRN
Status: DISCONTINUED | OUTPATIENT
Start: 2024-06-04 | End: 2024-06-04 | Stop reason: SURG

## 2024-06-04 RX ORDER — POLYETHYLENE GLYCOL 3350 17 G/17G
1 POWDER, FOR SOLUTION ORAL 2 TIMES DAILY PRN
Status: DISCONTINUED | OUTPATIENT
Start: 2024-06-04 | End: 2024-06-11 | Stop reason: HOSPADM

## 2024-06-04 RX ORDER — HYDROMORPHONE HYDROCHLORIDE 1 MG/ML
0.1 INJECTION, SOLUTION INTRAMUSCULAR; INTRAVENOUS; SUBCUTANEOUS
Status: DISCONTINUED | OUTPATIENT
Start: 2024-06-04 | End: 2024-06-04 | Stop reason: HOSPADM

## 2024-06-04 RX ORDER — CEFAZOLIN SODIUM 1 G/3ML
INJECTION, POWDER, FOR SOLUTION INTRAMUSCULAR; INTRAVENOUS
Status: DISCONTINUED | OUTPATIENT
Start: 2024-06-04 | End: 2024-06-04 | Stop reason: HOSPADM

## 2024-06-04 RX ORDER — MEPERIDINE HYDROCHLORIDE 25 MG/ML
12.5 INJECTION INTRAMUSCULAR; INTRAVENOUS; SUBCUTANEOUS
Status: DISCONTINUED | OUTPATIENT
Start: 2024-06-04 | End: 2024-06-04 | Stop reason: HOSPADM

## 2024-06-04 RX ORDER — OXYCODONE HCL 5 MG/5 ML
5 SOLUTION, ORAL ORAL
Status: COMPLETED | OUTPATIENT
Start: 2024-06-04 | End: 2024-06-04

## 2024-06-04 RX ORDER — CEFAZOLIN SODIUM 1 G/50ML
1 INJECTION, SOLUTION INTRAVENOUS EVERY 8 HOURS
Qty: 100 ML | Refills: 0 | Status: COMPLETED | OUTPATIENT
Start: 2024-06-04 | End: 2024-06-05

## 2024-06-04 RX ORDER — AMOXICILLIN 250 MG
1 CAPSULE ORAL NIGHTLY
Status: DISCONTINUED | OUTPATIENT
Start: 2024-06-04 | End: 2024-06-11 | Stop reason: HOSPADM

## 2024-06-04 RX ORDER — MIDAZOLAM HYDROCHLORIDE 1 MG/ML
1 INJECTION INTRAMUSCULAR; INTRAVENOUS
Status: DISCONTINUED | OUTPATIENT
Start: 2024-06-04 | End: 2024-06-04 | Stop reason: HOSPADM

## 2024-06-04 RX ORDER — HYDROMORPHONE HYDROCHLORIDE 1 MG/ML
0.4 INJECTION, SOLUTION INTRAMUSCULAR; INTRAVENOUS; SUBCUTANEOUS
Status: DISCONTINUED | OUTPATIENT
Start: 2024-06-04 | End: 2024-06-04 | Stop reason: HOSPADM

## 2024-06-04 RX ORDER — PROMETHAZINE HYDROCHLORIDE 25 MG/1
12.5-25 TABLET ORAL EVERY 4 HOURS PRN
Status: DISCONTINUED | OUTPATIENT
Start: 2024-06-04 | End: 2024-06-11 | Stop reason: HOSPADM

## 2024-06-04 RX ORDER — LABETALOL HYDROCHLORIDE 5 MG/ML
5 INJECTION, SOLUTION INTRAVENOUS
Status: DISCONTINUED | OUTPATIENT
Start: 2024-06-04 | End: 2024-06-04 | Stop reason: HOSPADM

## 2024-06-04 RX ORDER — BISACODYL 10 MG
10 SUPPOSITORY, RECTAL RECTAL
Status: DISCONTINUED | OUTPATIENT
Start: 2024-06-04 | End: 2024-06-11 | Stop reason: HOSPADM

## 2024-06-04 RX ORDER — ENEMA 19; 7 G/133ML; G/133ML
1 ENEMA RECTAL
Status: DISCONTINUED | OUTPATIENT
Start: 2024-06-04 | End: 2024-06-11 | Stop reason: HOSPADM

## 2024-06-04 RX ORDER — SUCCINYLCHOLINE CHLORIDE 20 MG/ML
INJECTION INTRAMUSCULAR; INTRAVENOUS PRN
Status: DISCONTINUED | OUTPATIENT
Start: 2024-06-04 | End: 2024-06-04 | Stop reason: SURG

## 2024-06-04 RX ORDER — BUPIVACAINE HYDROCHLORIDE AND EPINEPHRINE 5; 5 MG/ML; UG/ML
INJECTION, SOLUTION EPIDURAL; INTRACAUDAL; PERINEURAL
Status: DISCONTINUED | OUTPATIENT
Start: 2024-06-04 | End: 2024-06-04 | Stop reason: HOSPADM

## 2024-06-04 RX ORDER — PROCHLORPERAZINE EDISYLATE 5 MG/ML
5-10 INJECTION INTRAMUSCULAR; INTRAVENOUS EVERY 4 HOURS PRN
Status: DISCONTINUED | OUTPATIENT
Start: 2024-06-04 | End: 2024-06-11 | Stop reason: HOSPADM

## 2024-06-04 RX ORDER — SODIUM CHLORIDE, SODIUM LACTATE, POTASSIUM CHLORIDE, CALCIUM CHLORIDE 600; 310; 30; 20 MG/100ML; MG/100ML; MG/100ML; MG/100ML
INJECTION, SOLUTION INTRAVENOUS CONTINUOUS
Status: DISCONTINUED | OUTPATIENT
Start: 2024-06-04 | End: 2024-06-04 | Stop reason: HOSPADM

## 2024-06-04 RX ORDER — HYDROMORPHONE HYDROCHLORIDE 1 MG/ML
0.2 INJECTION, SOLUTION INTRAMUSCULAR; INTRAVENOUS; SUBCUTANEOUS
Status: DISCONTINUED | OUTPATIENT
Start: 2024-06-04 | End: 2024-06-04 | Stop reason: HOSPADM

## 2024-06-04 RX ORDER — MIDAZOLAM HYDROCHLORIDE 1 MG/ML
INJECTION INTRAMUSCULAR; INTRAVENOUS PRN
Status: DISCONTINUED | OUTPATIENT
Start: 2024-06-04 | End: 2024-06-04 | Stop reason: SURG

## 2024-06-04 RX ADMIN — CEFAZOLIN 2 G: 1 INJECTION, POWDER, FOR SOLUTION INTRAMUSCULAR; INTRAVENOUS at 14:34

## 2024-06-04 RX ADMIN — VENLAFAXINE HYDROCHLORIDE 75 MG: 75 CAPSULE, EXTENDED RELEASE ORAL at 08:37

## 2024-06-04 RX ADMIN — OXYCODONE HYDROCHLORIDE 10 MG: 5 SOLUTION ORAL at 18:00

## 2024-06-04 RX ADMIN — OXYCODONE HYDROCHLORIDE 10 MG: 10 TABLET ORAL at 21:14

## 2024-06-04 RX ADMIN — HYDROMORPHONE HYDROCHLORIDE 0.6 MG: 2 INJECTION INTRAMUSCULAR; INTRAVENOUS; SUBCUTANEOUS at 14:57

## 2024-06-04 RX ADMIN — ONDANSETRON 4 MG: 2 INJECTION INTRAMUSCULAR; INTRAVENOUS at 16:48

## 2024-06-04 RX ADMIN — DOCUSATE SODIUM 100 MG: 100 CAPSULE, LIQUID FILLED ORAL at 21:14

## 2024-06-04 RX ADMIN — DEXAMETHASONE SODIUM PHOSPHATE 4 MG: 4 INJECTION INTRA-ARTICULAR; INTRALESIONAL; INTRAMUSCULAR; INTRAVENOUS; SOFT TISSUE at 16:48

## 2024-06-04 RX ADMIN — LIDOCAINE 2 PATCH: 4 PATCH TOPICAL at 03:17

## 2024-06-04 RX ADMIN — MIDAZOLAM HYDROCHLORIDE 1 MG: 1 INJECTION, SOLUTION INTRAMUSCULAR; INTRAVENOUS at 14:28

## 2024-06-04 RX ADMIN — SENNOSIDES AND DOCUSATE SODIUM 1 TABLET: 50; 8.6 TABLET ORAL at 21:14

## 2024-06-04 RX ADMIN — SUCCINYLCHOLINE CHLORIDE 80 MG: 20 INJECTION, SOLUTION INTRAMUSCULAR; INTRAVENOUS at 14:30

## 2024-06-04 RX ADMIN — FENTANYL CITRATE 50 MCG: 50 INJECTION, SOLUTION INTRAMUSCULAR; INTRAVENOUS at 14:42

## 2024-06-04 RX ADMIN — LIDOCAINE HYDROCHLORIDE 100 MG: 20 INJECTION, SOLUTION EPIDURAL; INFILTRATION; INTRACAUDAL at 14:30

## 2024-06-04 RX ADMIN — Medication 1000 UNITS: at 05:03

## 2024-06-04 RX ADMIN — HYDROMORPHONE HYDROCHLORIDE 1 MG: 2 INJECTION INTRAMUSCULAR; INTRAVENOUS; SUBCUTANEOUS at 15:03

## 2024-06-04 RX ADMIN — HYDROMORPHONE HYDROCHLORIDE 0.4 MG: 2 INJECTION INTRAMUSCULAR; INTRAVENOUS; SUBCUTANEOUS at 14:52

## 2024-06-04 RX ADMIN — PROPOFOL 160 MG: 10 INJECTION, EMULSION INTRAVENOUS at 14:30

## 2024-06-04 RX ADMIN — SODIUM CHLORIDE, POTASSIUM CHLORIDE, SODIUM LACTATE AND CALCIUM CHLORIDE: 600; 310; 30; 20 INJECTION, SOLUTION INTRAVENOUS at 14:24

## 2024-06-04 RX ADMIN — FENTANYL CITRATE 50 MCG: 50 INJECTION, SOLUTION INTRAMUSCULAR; INTRAVENOUS at 14:28

## 2024-06-04 RX ADMIN — OMEPRAZOLE 20 MG: 20 CAPSULE, DELAYED RELEASE ORAL at 05:03

## 2024-06-04 RX ADMIN — OXYCODONE HYDROCHLORIDE 10 MG: 10 TABLET ORAL at 00:56

## 2024-06-04 RX ADMIN — CYANOCOBALAMIN TAB 500 MCG 1000 MCG: 500 TAB at 05:03

## 2024-06-04 RX ADMIN — METHOCARBAMOL 1000 MG: 100 INJECTION, SOLUTION INTRAMUSCULAR; INTRAVENOUS at 18:49

## 2024-06-04 RX ADMIN — PROPOFOL 80 MCG/KG/MIN: 10 INJECTION, EMULSION INTRAVENOUS at 14:34

## 2024-06-04 RX ADMIN — PHENYLEPHRINE HYDROCHLORIDE 80 MCG: 10 INJECTION INTRAVENOUS at 15:49

## 2024-06-04 RX ADMIN — INSULIN HUMAN 2 UNITS: 100 INJECTION, SOLUTION PARENTERAL at 21:18

## 2024-06-04 RX ADMIN — PHENYLEPHRINE HYDROCHLORIDE 30 MCG/MIN: 10 INJECTION INTRAVENOUS at 14:32

## 2024-06-04 RX ADMIN — HYDROMORPHONE HYDROCHLORIDE 0.5 MG: 1 INJECTION, SOLUTION INTRAMUSCULAR; INTRAVENOUS; SUBCUTANEOUS at 22:19

## 2024-06-04 ASSESSMENT — PAIN DESCRIPTION - PAIN TYPE
TYPE: SURGICAL PAIN
TYPE: ACUTE PAIN;SURGICAL PAIN
TYPE: SURGICAL PAIN
TYPE: ACUTE PAIN
TYPE: SURGICAL PAIN
TYPE: ACUTE PAIN
TYPE: ACUTE PAIN;SURGICAL PAIN

## 2024-06-04 ASSESSMENT — PAIN SCALES - GENERAL: PAIN_LEVEL: 0

## 2024-06-04 NOTE — ANESTHESIA PREPROCEDURE EVALUATION
Case: [id number] Date/Time: 06/04/24 1722    Procedure: DISCECTOMY, SPINE, CERVICAL, ANTERIOR APPROACH, WITH FUSION - C4-7    Location: [facility] OR 05 / SURGERY [facility]    Surgeons: Moo Pulido III, M.D.            Relevant Problems   PULMONARY   (positive) Pneumonia due to infectious organism      NEURO   (negative) CVA (cerebral vascular accident) (HCC)   (negative) TIA (transient ischemic attack)         (positive) Chronic renal insufficiency      ENDO   (positive) Diabetes mellitus type 2, insulin dependent, poorly controlled with marked hyperglycemia (HCC)      Other   (positive) Dyslipidemia   (positive) History of osteomyelitis of right hand (HCC)   (positive) Noncompliance with medications   (positive) Uncontrolled type 2 diabetes mellitus with hyperglycemia (HCC)       Physical Exam    Airway   Mallampati: II  TM distance: >3 FB  Neck ROM: full       Cardiovascular - normal exam  Rhythm: regular  Rate: normal  (-) murmur     Dental - normal exam           Pulmonary - normal exam  Breath sounds clear to auscultation     Abdominal    Neurological - normal exam                   Anesthesia Plan    ASA 3   ASA physical status 3 criteria: diabetes - poorly controlled    Plan - general       Airway plan will be ETT          Induction: intravenous    Postoperative Plan: Postoperative administration of opioids is intended.    Pertinent diagnostic labs and testing reviewed    Informed Consent:    Anesthetic plan and risks discussed with patient.    Use of blood products discussed with: patient whom consented to blood products.

## 2024-06-04 NOTE — ANESTHESIA PROCEDURE NOTES
Arterial Line    Performed by: Katey Winn M.D.  Authorized by: Katey Winn M.D.    Localization: surface landmarks    Patient Location:  OR  Indication: continuous blood pressure monitoring        Catheter Size:  20 G  Seldinger Technique?: Yes    Laterality:  Left  Site:  Radial artery  Line Secured:  Antimicrobial disc, tape and transparent dressing  Events: patient tolerated procedure well with no complications     One attempt, atraumatic.

## 2024-06-04 NOTE — ANESTHESIA PROCEDURE NOTES
Airway    Date/Time: 6/4/2024 2:31 PM    Performed by: Katey Winn M.D.  Authorized by: Shaun Glaser M.D.    Location:  OR  Urgency:  Elective  Indications for Airway Management:  Anesthesia      Spontaneous Ventilation: absent    Sedation Level:  Deep  Preoxygenated: Yes    Patient Position:  Sniffing  Mask Difficulty Assessment:  0 - not attempted  Final Airway Type:  Endotracheal airway  Final Endotracheal Airway:  ETT  Cuffed: Yes    Technique Used for Successful ETT Placement:  Direct laryngoscopy    Insertion Site:  Oral  Blade Type:  Ed  Laryngoscope Blade/Videolaryngoscope Blade Size:  3  ETT Size (mm):  7.5  Measured from:  Teeth  ETT to Teeth (cm):  22  Placement Verified by: auscultation and capnometry    Cormack-Lehane Classification:  Grade I - full view of glottis  Number of Attempts at Approach:  1

## 2024-06-04 NOTE — CARE PLAN
Problem: Fall Risk  Goal: Patient will remain free from falls  Outcome: Progressing  Note: Education provided regarding fall safety including use of call bell for assistance, bed alarm on and bed in lowest position   The patient is Stable - Low risk of patient condition declining or worsening    Shift Goals  Clinical Goals: pain management  Patient Goals: rest  Family Goals: vicente    Progress made toward(s) clinical / shift goals:  Patient received in bed he is alert and oriented x 4 on room air with no acute distress noted, NPO since midnight, report given to pre op. Right forearm G20 inserted with good backflow.     Patient is not progressing towards the following goals:

## 2024-06-04 NOTE — CARE PLAN
The patient is Stable - Low risk of patient condition declining or worsening    Shift Goals  Clinical Goals: Pain management, monitor BG, NPO at midnight  Patient Goals: Rest  Family Goals: vicente    Progress made toward(s) clinical / shift goals:  Patient alert and oriented, assessment completed. Patient c/o pain, administered medications per MAR. Patient's B, administered insulin per MAR. Patient NPO since midnight for procedure later. Patient resting comfortably, bed in lowest position, call light within reach.      Problem: Pain - Standard  Goal: Alleviation of pain or a reduction in pain to the patient’s comfort goal  Outcome: Progressing     Problem: Knowledge Deficit - Standard  Goal: Patient and family/care givers will demonstrate understanding of plan of care, disease process/condition, diagnostic tests and medications  Outcome: Progressing     Problem: Fall Risk  Goal: Patient will remain free from falls  Outcome: Progressing       Patient is not progressing towards the following goals:

## 2024-06-04 NOTE — PROGRESS NOTES
Neurosurgery Progress Note    Subjective:  59 yo man presented with a fall about one week ago with resultant increased right foot weakness. MRI shows critical L4-5 spondylosis and stenosis. Has a history of a prior L5-S1 onlay fusion 30 years ago.     Reporting back pain and right greater than left lower extremity radiculopathy.   Right foot weakness requiring AFO brace. Has bene having loss of bowels for several months.    Lumbar x-rays  show multilevel spondylosis with grade 1 retrolisthesis of L2 on L3 and L3 on L4. No dynamic instability.  Cervical MRI  shows multilevel severe central canal stenosis C4-7 with possible cord signal change C4-6.    Currently being treated for pneumonia and uncontrolled diabetes.     Has been NPO.       Exam:  Bilateral interossei wasting, 4+/5 left deltoid, 3/5 bilateral triceps, 4-/5 bilateral interossei.  Motor right EHL/AT 4/5, limited due to flat foot on the left with 4/5, othwerise 5/5 in bilateral lower extremities.   Sensation intact except distally in the lower extremities    BP  Min: 102/63  Max: 126/79  Pulse  Av.8  Min: 80  Max: 95  Resp  Av.8  Min: 16  Max: 18  Temp  Av.4 °C (97.6 °F)  Min: 36.2 °C (97.2 °F)  Max: 36.7 °C (98.1 °F)  SpO2  Av.3 %  Min: 90 %  Max: 94 %    No data recorded    Recent Labs     24  0053   WBC 5.6   RBC 3.90*   HEMOGLOBIN 11.0*   HEMATOCRIT 34.5*   MCV 88.5   MCH 28.2   MCHC 31.9*   RDW 44.1   PLATELETCT 231   MPV 8.9*     Recent Labs     24  0415 24  0053   SODIUM 142 142   POTASSIUM 4.4 4.2   CHLORIDE 103 104   CO2 28 30   GLUCOSE 87 152*   BUN 25* 27*   CREATININE 0.51 0.74   CALCIUM 8.4* 8.3*                 Intake/Output                         24 07 - 24 0659 24 - 24 Total  Total                 Intake    P.O.  --  128 128  --  -- --    P.O. -- 128 128 -- -- --    Total Intake -- 128 128 -- -- --       Output     Urine  --  -- --  --  -- --    Number of Times Voided -- 2 x 2 x -- -- --    Stool  --  -- --  --  -- --    Number of Times Stooled -- 2 x 2 x -- -- --    Total Output -- -- -- -- -- --       Net I/O     -- 128 128 -- -- --              Intake/Output Summary (Last 24 hours) at 6/4/2024 0914  Last data filed at 6/3/2024 2011  Gross per 24 hour   Intake 128 ml   Output --   Net 128 ml             Pharmacy Consult Request  1 Each PHARMACY TO DOSE    oxyCODONE immediate-release  5 mg Q3HRS PRN    Or    oxyCODONE immediate-release  10 mg Q3HRS PRN    Or    HYDROmorphone  0.5 mg Q3HRS PRN    lidocaine  2 Patch Q24HR    venlafaxine XR  75 mg QDAY with Breakfast    cyanocobalamin  1,000 mcg DAILY    vitamin D3  1,000 Units DAILY    melatonin  5 mg HS PRN    acetaminophen  650 mg Q6HRS PRN    ondansetron  4 mg Q4HRS PRN    ondansetron  4 mg Q4HRS PRN    promethazine  12.5-25 mg Q4HRS PRN    promethazine  12.5-25 mg Q4HRS PRN    prochlorperazine  5-10 mg Q4HRS PRN    insulin regular  2-9 Units 4X/DAY ACHS    And    dextrose bolus  25 g Q15 MIN PRN    aspirin  81 mg Q EVENING    metoclopramide  10 mg TID PRN    omeprazole  20 mg BID    insulin GLARGINE  25 Units BID       Assessment and Plan:  Hospital day # 9  POD# 0 C4-7 ACDF.  Flexion/extension x-rays completed and reviewed  Cervical MRI completed and reviewed  Discussed plan of care with Dr. Pulido who is recommending a  redo L5 and L4 laminectomy with possible stealth fusion  to be postponed until Friday.  He will need C4-7 ACDF on 6/4/24.  NPO now, ok for sips with meds.   Will appreciate medical risk stratification/medical clearance for a two hour prone procedure Tuesday.   Following    Chemical prophylactic DVT therapy: Yes - Lovenox 40mg/qd    Start date/time: Hold for surgery on 6/4.      Brain Compression: No

## 2024-06-05 LAB
GLUCOSE BLD STRIP.AUTO-MCNC: 145 MG/DL (ref 65–99)
GLUCOSE BLD STRIP.AUTO-MCNC: 154 MG/DL (ref 65–99)
GLUCOSE BLD STRIP.AUTO-MCNC: 203 MG/DL (ref 65–99)
GLUCOSE BLD STRIP.AUTO-MCNC: 317 MG/DL (ref 65–99)

## 2024-06-05 PROCEDURE — A9270 NON-COVERED ITEM OR SERVICE: HCPCS | Performed by: HOSPITALIST

## 2024-06-05 PROCEDURE — 700102 HCHG RX REV CODE 250 W/ 637 OVERRIDE(OP)

## 2024-06-05 PROCEDURE — 97168 OT RE-EVAL EST PLAN CARE: CPT

## 2024-06-05 PROCEDURE — 770006 HCHG ROOM/CARE - MED/SURG/GYN SEMI*

## 2024-06-05 PROCEDURE — 99232 SBSQ HOSP IP/OBS MODERATE 35: CPT | Performed by: HOSPITALIST

## 2024-06-05 PROCEDURE — 700101 HCHG RX REV CODE 250

## 2024-06-05 PROCEDURE — 700102 HCHG RX REV CODE 250 W/ 637 OVERRIDE(OP): Performed by: INTERNAL MEDICINE

## 2024-06-05 PROCEDURE — 700102 HCHG RX REV CODE 250 W/ 637 OVERRIDE(OP): Performed by: HOSPITALIST

## 2024-06-05 PROCEDURE — 700111 HCHG RX REV CODE 636 W/ 250 OVERRIDE (IP): Mod: JZ

## 2024-06-05 PROCEDURE — 700111 HCHG RX REV CODE 636 W/ 250 OVERRIDE (IP)

## 2024-06-05 PROCEDURE — 82962 GLUCOSE BLOOD TEST: CPT | Mod: 91

## 2024-06-05 PROCEDURE — A9270 NON-COVERED ITEM OR SERVICE: HCPCS

## 2024-06-05 PROCEDURE — A9270 NON-COVERED ITEM OR SERVICE: HCPCS | Performed by: INTERNAL MEDICINE

## 2024-06-05 PROCEDURE — 97535 SELF CARE MNGMENT TRAINING: CPT

## 2024-06-05 RX ADMIN — HYDROMORPHONE HYDROCHLORIDE 0.5 MG: 1 INJECTION, SOLUTION INTRAMUSCULAR; INTRAVENOUS; SUBCUTANEOUS at 06:22

## 2024-06-05 RX ADMIN — HYDROMORPHONE HYDROCHLORIDE 0.5 MG: 1 INJECTION, SOLUTION INTRAMUSCULAR; INTRAVENOUS; SUBCUTANEOUS at 19:51

## 2024-06-05 RX ADMIN — VENLAFAXINE HYDROCHLORIDE 75 MG: 75 CAPSULE, EXTENDED RELEASE ORAL at 08:19

## 2024-06-05 RX ADMIN — OXYCODONE HYDROCHLORIDE 10 MG: 10 TABLET ORAL at 05:16

## 2024-06-05 RX ADMIN — DOCUSATE SODIUM 100 MG: 100 CAPSULE, LIQUID FILLED ORAL at 05:15

## 2024-06-05 RX ADMIN — INSULIN HUMAN 6 UNITS: 100 INJECTION, SOLUTION PARENTERAL at 08:23

## 2024-06-05 RX ADMIN — LIDOCAINE 2 PATCH: 4 PATCH TOPICAL at 03:20

## 2024-06-05 RX ADMIN — OMEPRAZOLE 20 MG: 20 CAPSULE, DELAYED RELEASE ORAL at 05:15

## 2024-06-05 RX ADMIN — OMEPRAZOLE 20 MG: 20 CAPSULE, DELAYED RELEASE ORAL at 17:11

## 2024-06-05 RX ADMIN — HYDROMORPHONE HYDROCHLORIDE 0.5 MG: 1 INJECTION, SOLUTION INTRAMUSCULAR; INTRAVENOUS; SUBCUTANEOUS at 14:25

## 2024-06-05 RX ADMIN — OXYCODONE HYDROCHLORIDE 10 MG: 10 TABLET ORAL at 12:19

## 2024-06-05 RX ADMIN — CEFAZOLIN SODIUM 1 G: 1 INJECTION, SOLUTION INTRAVENOUS at 08:40

## 2024-06-05 RX ADMIN — CYANOCOBALAMIN TAB 500 MCG 1000 MCG: 500 TAB at 05:16

## 2024-06-05 RX ADMIN — Medication 1000 UNITS: at 05:15

## 2024-06-05 RX ADMIN — INSULIN HUMAN 3 UNITS: 100 INJECTION, SOLUTION PARENTERAL at 12:21

## 2024-06-05 RX ADMIN — ENOXAPARIN SODIUM 40 MG: 100 INJECTION SUBCUTANEOUS at 17:11

## 2024-06-05 RX ADMIN — OXYCODONE HYDROCHLORIDE 10 MG: 10 TABLET ORAL at 23:07

## 2024-06-05 RX ADMIN — CEFAZOLIN SODIUM 1 G: 1 INJECTION, SOLUTION INTRAVENOUS at 00:28

## 2024-06-05 RX ADMIN — OXYCODONE HYDROCHLORIDE 10 MG: 10 TABLET ORAL at 17:21

## 2024-06-05 ASSESSMENT — ENCOUNTER SYMPTOMS
SHORTNESS OF BREATH: 0
BACK PAIN: 1
LOSS OF CONSCIOUSNESS: 0
ABDOMINAL PAIN: 0
CHILLS: 0
VOMITING: 0
NAUSEA: 0
DIZZINESS: 0
DIARRHEA: 0
FEVER: 0
NECK PAIN: 1
HEADACHES: 0
COUGH: 0
PALPITATIONS: 0
SENSORY CHANGE: 1

## 2024-06-05 ASSESSMENT — PAIN DESCRIPTION - PAIN TYPE
TYPE: SURGICAL PAIN
TYPE: ACUTE PAIN
TYPE: SURGICAL PAIN
TYPE: SURGICAL PAIN
TYPE: ACUTE PAIN
TYPE: SURGICAL PAIN
TYPE: ACUTE PAIN
TYPE: ACUTE PAIN

## 2024-06-05 ASSESSMENT — COGNITIVE AND FUNCTIONAL STATUS - GENERAL
EATING MEALS: A LITTLE
DRESSING REGULAR LOWER BODY CLOTHING: A LOT
DAILY ACTIVITIY SCORE: 15
HELP NEEDED FOR BATHING: A LOT
PERSONAL GROOMING: A LITTLE
TOILETING: A LOT
SUGGESTED CMS G CODE MODIFIER DAILY ACTIVITY: CK
DRESSING REGULAR UPPER BODY CLOTHING: A LITTLE

## 2024-06-05 ASSESSMENT — PATIENT HEALTH QUESTIONNAIRE - PHQ9
1. LITTLE INTEREST OR PLEASURE IN DOING THINGS: NOT AT ALL
SUM OF ALL RESPONSES TO PHQ9 QUESTIONS 1 AND 2: 0
2. FEELING DOWN, DEPRESSED, IRRITABLE, OR HOPELESS: NOT AT ALL

## 2024-06-05 NOTE — CARE PLAN
The patient is Watcher - Medium risk of patient condition declining or worsening    Shift Goals  Clinical Goals: Patient's pain will be <5 throughout shift  Patient Goals: Sleep and rest  Family Goals: vicente    Progress made toward(s) clinical / shift goals:    Patient is alert and oriented x4, able to communicate needs. Patient's pain is controlled with medication, rest and repositioning. Patient's bed alarm is on, bed in low position, call light within reach.

## 2024-06-05 NOTE — ANESTHESIA TIME REPORT
Anesthesia Start and Stop Event Times       Date Time Event    6/4/2024 1411 Ready for Procedure     1424 Anesthesia Start     1731 Anesthesia Stop          Responsible Staff  06/04/24      Name Role Begin End    Katey Winn M.D. Anesth 1424 1504    Shaun Glaser M.D. Anesth 1504 1731          Overtime Reason:  no overtime (within assigned shift)    Comments:

## 2024-06-05 NOTE — CARE PLAN
Problem: Pain - Standard  Goal: Alleviation of pain or a reduction in pain to the patient’s comfort goal  Outcome: Progressing  Flowsheets (Taken 6/5/2024 1610)  OB Pain Intervention:   Medication - See MAR   Relaxation technique   Iron Belt   Environmental changes   Distraction   Breathing technique   Rest  Note: Education provided regarding pain management including nonpharmacological measures such as rest and relaxation   The patient is Stable - Low risk of patient condition declining or worsening    Shift Goals  Clinical Goals: pain management  Patient Goals: pain control;rest  Family Goals: vicente    Progress made toward(s) clinical / shift goals:      Patient is not progressing towards the following goals:

## 2024-06-05 NOTE — PROGRESS NOTES
Patient is a&o x 4 on room air with no acute distress noted. Ambulatory with FWW. Standby assist. NPO since midnight. Left unit with transport for OR. Transport went back in the room to put back his phone.

## 2024-06-05 NOTE — OP REPORT
DATE OF SERVICE:  06/04/2024     PREOPERATIVE DIAGNOSES:  1.  Cervical spondylitic myelopathy.  2.  Cervical stenosis.  3.  Cervical spondylolisthesis.     POSTOPERATIVE DIAGNOSES:  1.  Cervical spondylitic myelopathy.  2.  Cervical stenosis.  3.  Cervical spondylolisthesis.     PROCEDURES PERFORMED:  1.  Partial C4 corpectomy, diskectomy, decompression of thecal sac and nerves   roots, 59 modifier.  2.  Partial C5 corpectomy, diskectomy, decompression of thecal sac and nerves   roots, 59 modifier.  3.  Partial C6 corpectomy, diskectomy, decompression of thecal sac and nerves   roots, 59 modifier.  4.  Partial C7 corpectomy, diskectomy, decompression of thecal sac and nerves   roots, 59 modifier.  5.  C4-5, 5-6, 6-7 titanium interbody cage placement.  6.  C4-5, 5-6, 6-7 interbody allograft autograft anterior arthrodesis and   fusion.  7.  C4, C5, C6, C7 anterior plating fixation.  8.  Microscopic dissection.  9.  Intraoperative monitoring.     SURGEON:  Moo Pulido III, MD     ASSISTANT:  Pattie Mckinney PA-C     ANESTHESIA:  General.     COMPLICATIONS:  None.     ESTIMATED BLOOD LOSS:  25 mL.     FINDINGS:  No change in motors and SSEPs, reasonable bony fixation.     COMPLICATIONS:  None.     DRAINS:  A subplatysmal Hemovac.     DISPOSITION:  Extubated to recovery and to floor.     HISTORY OF PRESENT ILLNESS:  A 58-year-old man who had come in for bilateral   foot drops, was on the course to get a laminectomy for those when we had also   discussed with him the loss of function in the upper extremities.  An MRI of   the cervical spine showed critical spondylosis and stenosis from C4-5, 5-6 and   6-7.  I explained the cervical myeolpathyand cord compression issues took priority over the lumbar issues for fear of putting him under   anesthesia with critical compression of the thecal sac.  I explained the   risks, benefits, alternatives of the anterior fusions with partial   corpectomies because he had complete  autofusion of the collapsed disk space   and then retropulsed disk residual, causing severe anterior stenosis.  We   explained the risks, benefits, alternatives to him including pain, infection,   bleeding, CSF leak, failure to completely resolve symptoms, neurologic   deficits including pain, weakness, numbness, bladder or bowel difficulties,   failure of fixation, failure of fusion, need for rostral caudal extension due   to junctional stenosis, injury to trachea, carotid, esophagus and temporary or   permanent speaking, swallowing difficulties.  He understood the risks,   benefits and agreed to consent.     SUMMARY OF OPERATIVE PROCEDURE:  The patient was brought to the operating   suite, placed under general anesthesia.  SSEPs and motors were run at baseline   by NMA monitoring and the anesthesia kept the MAPs greater than 80 during the   case.  The patient was placed on a regular bed supine, placed head back in   extension with a shoulder roll.  All padded pressure points secured,   everything taped down.  X-ray fluoroscopy was brought in to draw a right   anterior transverse neck incision on the C5-6 disk space.  This was   infiltrated with Marcaine with epinephrine.  Preoperative antibiotics were   given.  Proper timeout was performed.  The patient was prepped and draped in   sterile fashion.     A linear incision was made and soft tissues dissected with bipolar and   monopolar electrocautery.  We found the platysma, incised it sharply and did   subplatysmal dissection, care-taking a couple of crossing veins and arteries   with 2-0 silk ligatures.  We did blunt dissections, resecting the omohyoid,   brought the trachea and esophagus medially, carotid sheath laterally.  We   elevated the longus colli, noted woodiness and stiffness to the tissues that   he was almost partially ankylosed even of the soft tissues.  We constructed a   Shadow-Line retractor that was difficult to maintain position of the entire    case.  X-ray fluoroscopy was brought in to verify the levels and we put De Leon   pins at the levels needed for distraction.     Partial C4, partial C5, partial C6, partial C7 corpectomy, diskectomy,   decompression of thecal sac and nerve roots:  Because of complete collapse of   disk space, I performed partial corpectomies, greater than 50% depth of the   vertebral body at all levels.  This required additional level of skill, risks   and expertise to do so without completely destabilizing the interbody space   and causing graft subsidence justifying 59 modifier coding.  With sequential   distraction at each level, we drilled partial corpectomies.  There was hardly   any disk at all to remove and this was all harvested for autograft later.    Once we got down to thickened PLL, we brought the microscope in for increased   visualization, elevated with a Dittmar elevator and resected it widely for   nice central decompression.  There was no change of motors and SSEPs.  We put   Surgiflo down at all these levels.  We constructed descending trapezoidal   space to accept the surface technology grafts, so I do not believe there will   be graft subsidence despite the tremendous amount of bone taken.     C4-5, 5-6, 6-7 titanium interbody cage placement.  Using the [employer] surface   technology identity porous titanium spacers, we trialled out at each level   4-5, a [id number] by 7-degree lordotic cage.  This had a nice tight snug fit   without over distraction of the posterior facets when De Leon pin distraction   was released.  There was good apposition because of the partial corpectomy   work to the trapezoidal shape of the graft.  At C5-C6, it was [id number] mm by   7-degree lordotic cage.  At C6-C7, it was a [id number] by 7-degree lordotic cage.    These all had nice apposition of the endplates and no change of motors and   SSEPs during placement.     C4-5, 5-6, 6-7 anterior interbody allograft autograft arthrodesis and fusion:     For introduction of the graft, this was packed with OsteoAMP cellular   cortical fibers and morcellized corpectomy, but these grafts are designed with   surface technology, caused direct binding vertebral body above and below   helping to assure fusion.     C4, C5, C6, C7 anterior plating fixation:  Using a 4-level plate after   drilling the anterior osteophytes, flushed to be able to have the plate lay   nicely.  We drilled parallel to the endplates bilaterally at C4, C5, C6, C7,   confirmed bony confines and bilaterally at C4, C5, C6, and the left C7, we   placed a 4.0x14 mm self-tapping screw.  Because of the location of the [place]   pin in C7, the right C7 screw was a 4.5x14 mm screw.  We tightened them down   with 's locking knot to prevent backing out of the screws.  We   were happy with our final construct.     We copiously irrigated with bacitracin and saline.  We tunneled out a medium   Hemovac subplatysmally and secured to skin with stitch.  We closed the wound   in anatomic layers, 3-0 Vicryl for platysma, 3-0 Vicryls for the dermis and   Steri-Strips for the skin.  Sterile dressing was applied.  The patient was   extubated to go to recovery.     There were no complications.  Needle and sponge count correct at the end of   the case.  His lumbar surgery will be likely Friday.        ______________________________  MD LUIS Swift III/YINA/JEREMY    DD:  06/04/2024 17:40  DT:  06/04/2024 19:05    Job#:  [id number]

## 2024-06-05 NOTE — ANESTHESIA POSTPROCEDURE EVALUATION
Patient: Christoph Taylor    Procedure Summary       Date: 06/04/24 Room / Location: [facility] 05 / SURGERY [facility]    Anesthesia Start: 1424 Anesthesia Stop: 1731    Procedure: C4-C7 ANTERIOR CERVICAL DISC AND FUSION (Spine Cervical) Diagnosis: (cervical spondylomyelopathy)    Surgeons: Moo Pulido III, M.D. Responsible Provider: Shaun Glaser M.D.    Anesthesia Type: general ASA Status: 3            Final Anesthesia Type: general  Last vitals  BP   Blood Pressure: 133/83    Temp   (!) 35.6 °C (96 °F) (warm blankets applied)    Pulse   90   Resp   (!) 7    SpO2   95 %      Anesthesia Post Evaluation    Patient location during evaluation: PACU  Patient participation: complete - patient participated  Level of consciousness: awake and alert  Pain score: 0    Airway patency: patent  Anesthetic complications: no  Cardiovascular status: hemodynamically stable  Respiratory status: acceptable  Hydration status: euvolemic    PONV: none          No notable events documented.     Nurse Pain Score: 2 (NPRS)

## 2024-06-05 NOTE — PROGRESS NOTES
"Hospital Medicine Daily Progress Note    Date of Service  6/5/2024    Chief Complaint  Christoph Taylor is a 59 y.o. male admitted 5/27/2024 with GLF    Hospital Course  60yo PMHx Dm, esophagitis.  Presented after a GLF and found to have a pneumonia.  MRI showing severe spinal stenosis.  Taken to the OR on 6/4 for ant fusion    Interval Problem Update  Neck feels \"sore\".  Some burning in his hands but seems less intense than before the surgery.  No other sensation changes.  No B/B changes    DW Neuro Srgry    I have discussed this patient's plan of care and discharge plan at IDT rounds today with Case Management, Nursing, Nursing leadership, and other members of the IDT team.    Consultants/Specialty  neurosurgery    Code Status  Full Code    Disposition  The patient is not medically cleared for discharge to home or a post-acute facility.      I have placed the appropriate orders for post-discharge needs.    Review of Systems  Review of Systems   Unable to perform ROS: Other   Constitutional:  Negative for chills and fever.   Respiratory:  Negative for cough and shortness of breath.    Cardiovascular:  Negative for chest pain, palpitations and leg swelling.   Gastrointestinal:  Negative for abdominal pain, diarrhea, nausea and vomiting.   Genitourinary:  Negative for dysuria and urgency.   Musculoskeletal:  Positive for back pain and neck pain.   Skin:  Negative for rash.   Neurological:  Positive for sensory change. Negative for dizziness, loss of consciousness and headaches.        Physical Exam  Temp:  [35.6 °C (96 °F)-36.7 °C (98.1 °F)] 36.7 °C (98 °F)  Pulse:  [86-93] 88  Resp:  [7-20] 18  BP: ()/(68-96) 127/77  SpO2:  [90 %-100 %] 100 %    Physical Exam  Constitutional:       General: He is not in acute distress.     Appearance: He is well-developed. He is not diaphoretic.   HENT:      Head: Normocephalic and atraumatic.   Eyes:      Conjunctiva/sclera: Conjunctivae normal.   Neck:      Vascular: No " JVD.      Comments: In hard collar  Post op dressing in place  Cardiovascular:      Rate and Rhythm: Normal rate.      Heart sounds: No murmur heard.     No gallop.   Pulmonary:      Effort: Pulmonary effort is normal. No respiratory distress.      Breath sounds: No stridor. No wheezing or rales.   Abdominal:      Palpations: Abdomen is soft.      Tenderness: There is no abdominal tenderness. There is no guarding or rebound.   Skin:     General: Skin is warm and dry.      Findings: No rash.   Neurological:      Mental Status: He is oriented to person, place, and time.      Comments: Antigravity strength x 4.  Sensation intact   Psychiatric:         Mood and Affect: Mood normal.         Behavior: Behavior normal.         Thought Content: Thought content normal.         Fluids    Intake/Output Summary (Last 24 hours) at 6/5/2024 0702  Last data filed at 6/5/2024 0416  Gross per 24 hour   Intake 1120 ml   Output 80 ml   Net 1040 ml       Laboratory  Recent Labs     06/04/24  0053   WBC 5.6   RBC 3.90*   HEMOGLOBIN 11.0*   HEMATOCRIT 34.5*   MCV 88.5   MCH 28.2   MCHC 31.9*   RDW 44.1   PLATELETCT 231   MPV 8.9*     Recent Labs     06/04/24  0053   SODIUM 142   POTASSIUM 4.2   CHLORIDE 104   CO2 30   GLUCOSE 152*   BUN 27*   CREATININE 0.74   CALCIUM 8.3*                   Imaging  MR-CERVICAL SPINE-W/O   Final Result      1.  Multilevel multifactorial degenerative changes   2.  High-grade central canal narrowing at C4-C5, C5-C6 and C6-C7 with findings suspicious for myelomalacia at C4-C6   3.  Other areas of central canal and neural foraminal narrowing as described above      DX-LUMBAR SPINE-BEND OR FLEX-EXT   Final Result      L1-L4 degenerative retrolisthesis without definite significant hypermobility.      DX-LUMBAR SPINE-2 OR 3 VIEWS   Final Result      1.  Multilevel degenerative changes of the lumbar spine.   2.  Mild grade 1 retrolisthesis visible with extension maneuver at L2-3 and L3-4 which could indicate  hypermobility.      MR-THORACIC SPINE-WITH & W/O   Final Result         Remote endplate compression fractures at T1, T2, T12 without significant posterior cortical retropulsion or canal stenosis.      Mild degenerative changes of the thoracic spine without significant canal stenosis or foraminal narrowing.         MR-LUMBAR SPINE-WITH & W/O   Final Result         Severe canal stenosis at L4-5 with cauda equina compression. There is also severe right foraminal narrowing.      Postoperative changes at L5-S1 with a well decompressed canal.      Remote compression fracture involving the superior endplate of T12 with 50% loss of height.      DX-FOOT-2- LEFT   Final Result      No acute osseous abnormality.      Prominent degenerative changes within the mid and hindfoot with pes planus type deformity.      CT-LSPINE W/O PLUS RECONS   Final Result         1.  No acute traumatic bony injury of the lumbar spine.      CT-TSPINE W/O PLUS RECONS   Final Result         1.  No acute traumatic bony injury of the thoracic spine.      CT-CHEST,ABDOMEN,PELVIS WITH   Final Result      DX-PORTABLE FLUORO > 1 HOUR    (Results Pending)   DX-CERVICAL SPINE-2 OR 3 VIEWS    (Results Pending)        Assessment/Plan  * Pneumonia due to infectious organism- (present on admission)  Assessment & Plan  Bilateral infiltrates on CT scan  Follow-up blood cultures and sputum cultures neg  ceftriaxone and doxycycline completed 6/2    Chronic renal insufficiency  Assessment & Plan  Creat baseline  Follow daily BMP  Renally dose medications as appropriate    Cauda equina compression (HCC)  Assessment & Plan  Srgry following  L spine decompression planned for Fri  PT/OT  Physiatry consult    Cervical stenosis of spinal canal  Assessment & Plan  S/p antfusion 6/4  PT   OT  Follow neuro exam  Neuro surgery following  Supportive care    Fall  Assessment & Plan  Patient did not lose consciousness or hit his head  Complains of neck pain, back pain and hip  pain  PT OT eval    Acute respiratory failure with hypoxia (HCC)- (present on admission)  Assessment & Plan  On 3 L of O2 above baseline  IS  O2/RT protocols  Mobilize as able  Weaning down to baseline    Thoracic back pain- (present on admission)  Assessment & Plan  Critical spinal stenosis of both C and L spines  Cont supportive care while definitive surgeries are completed    Diabetes mellitus type 2, insulin dependent, poorly controlled with marked hyperglycemia (HCC)- (present on admission)  Assessment & Plan  insulin sliding scale with serial Accu-Checks  A1c 12.4  Hypoglycemic protocol in place  Increased Lantus  Improved    Acute esophagitis- (present on admission)  Assessment & Plan  Continue Protonix         VTE prophylaxis: VTE Selection    I have performed a physical exam and reviewed and updated ROS and Plan today (6/5/2024). In review of yesterday's note (6/4/2024), there are no changes except as documented above.         spontaneous/labored

## 2024-06-05 NOTE — PROGRESS NOTES
Aspen rigid universal size fit cervical collar has been applied and fitted to patient in [facility] OR pacu recovery unit.

## 2024-06-05 NOTE — OR NURSING
Pt's VSS. Pt on 6L oxymask. Pt resting comfortably. Pt has no signs of nausea and pain. Pt's surgical dressing CDI. Pt's sister, Delia, called and updated. Pt returning to room S630.

## 2024-06-05 NOTE — PROGRESS NOTES
Hospital Medicine Daily Progress Note    Date of Service  6/4/2024    Chief Complaint  Christoph Taylor is a 59 y.o. male admitted 5/27/2024 with GLF    Hospital Course  60yo PMHx Dm, esophagitis.  Presented after a GLF and found to have a pneumonia.  MRI showing severe spinal stenosis.  Taken to the OR on 6/4 for ant fusion    Interval Problem Update  ROS limited as acutely post op    I have discussed this patient's plan of care and discharge plan at IDT rounds today with Case Management, Nursing, Nursing leadership, and other members of the IDT team.    Consultants/Specialty  neurosurgery    Code Status  Full Code    Disposition  The patient is not medically cleared for discharge to home or a post-acute facility.      I have placed the appropriate orders for post-discharge needs.    Review of Systems  Review of Systems   Unable to perform ROS: Other        Physical Exam  Temp:  [35.6 °C (96 °F)-36.7 °C (98.1 °F)] 36.5 °C (97.7 °F)  Pulse:  [83-95] 91  Resp:  [7-18] 7  BP: (110-157)/(69-96) 148/84  SpO2:  [90 %-97 %] 96 %    Physical Exam  Constitutional:       General: He is not in acute distress.     Appearance: He is well-developed. He is not diaphoretic.   HENT:      Head: Normocephalic and atraumatic.   Eyes:      Conjunctiva/sclera: Conjunctivae normal.   Neck:      Vascular: No JVD.      Comments: In collar  Cardiovascular:      Rate and Rhythm: Normal rate.      Heart sounds: No murmur heard.     No gallop.   Pulmonary:      Effort: Pulmonary effort is normal. No respiratory distress.      Breath sounds: No stridor. No wheezing or rales.   Abdominal:      Palpations: Abdomen is soft.      Tenderness: There is no abdominal tenderness. There is no guarding or rebound.   Skin:     General: Skin is warm and dry.      Findings: No rash.   Neurological:      Mental Status: He is oriented to person, place, and time.   Psychiatric:         Thought Content: Thought content normal.         Fluids    Intake/Output  Summary (Last 24 hours) at 6/4/2024 1804  Last data filed at 6/4/2024 1701  Gross per 24 hour   Intake 1128 ml   Output --   Net 1128 ml       Laboratory  Recent Labs     06/04/24  0053   WBC 5.6   RBC 3.90*   HEMOGLOBIN 11.0*   HEMATOCRIT 34.5*   MCV 88.5   MCH 28.2   MCHC 31.9*   RDW 44.1   PLATELETCT 231   MPV 8.9*     Recent Labs     06/02/24  0415 06/04/24  0053   SODIUM 142 142   POTASSIUM 4.4 4.2   CHLORIDE 103 104   CO2 28 30   GLUCOSE 87 152*   BUN 25* 27*   CREATININE 0.51 0.74   CALCIUM 8.4* 8.3*                   Imaging  MR-CERVICAL SPINE-W/O   Final Result      1.  Multilevel multifactorial degenerative changes   2.  High-grade central canal narrowing at C4-C5, C5-C6 and C6-C7 with findings suspicious for myelomalacia at C4-C6   3.  Other areas of central canal and neural foraminal narrowing as described above      DX-LUMBAR SPINE-BEND OR FLEX-EXT   Final Result      L1-L4 degenerative retrolisthesis without definite significant hypermobility.      DX-LUMBAR SPINE-2 OR 3 VIEWS   Final Result      1.  Multilevel degenerative changes of the lumbar spine.   2.  Mild grade 1 retrolisthesis visible with extension maneuver at L2-3 and L3-4 which could indicate hypermobility.      MR-THORACIC SPINE-WITH & W/O   Final Result         Remote endplate compression fractures at T1, T2, T12 without significant posterior cortical retropulsion or canal stenosis.      Mild degenerative changes of the thoracic spine without significant canal stenosis or foraminal narrowing.         MR-LUMBAR SPINE-WITH & W/O   Final Result         Severe canal stenosis at L4-5 with cauda equina compression. There is also severe right foraminal narrowing.      Postoperative changes at L5-S1 with a well decompressed canal.      Remote compression fracture involving the superior endplate of T12 with 50% loss of height.      DX-FOOT-2- LEFT   Final Result      No acute osseous abnormality.      Prominent degenerative changes within the mid  and hindfoot with pes planus type deformity.      CT-LSPINE W/O PLUS RECONS   Final Result         1.  No acute traumatic bony injury of the lumbar spine.      CT-TSPINE W/O PLUS RECONS   Final Result         1.  No acute traumatic bony injury of the thoracic spine.      CT-CHEST,ABDOMEN,PELVIS WITH   Final Result      DX-PORTABLE FLUORO > 1 HOUR    (Results Pending)   DX-CERVICAL SPINE-2 OR 3 VIEWS    (Results Pending)        Assessment/Plan  * Pneumonia due to infectious organism- (present on admission)  Assessment & Plan  Bilateral infiltrates on CT scan  Follow-up blood cultures and sputum cultures  ceftriaxone and doxycycline    Chronic renal insufficiency  Assessment & Plan  Creat baseline  Follow daily BMP  Renally dose medications as appropriate    Cauda equina compression (HCC)  Assessment & Plan  Srgry following    Cervical stenosis of spinal canal  Assessment & Plan  S/p antfusion 6/4  PT   OT  Follow neuro exam  Neuro surgery following  Supportive care    Fall  Assessment & Plan  Patient did not lose consciousness or hit his head  Complains of neck pain, back pain and hip pain  PT OT eval    Acute respiratory failure with hypoxia (HCC)- (present on admission)  Assessment & Plan  On 3 L of O2 above baseline  Weaning down to baseline    Thoracic back pain- (present on admission)  Assessment & Plan  Acute on chronic back pain  CT scans are negative  Straight leg test negative    Ordered MRI back reviewed results  MRI neck, reviewed results  Cervical surgery TOMORROW  Redo back surgery tentative FRI    Diabetes mellitus type 2, insulin dependent, poorly controlled with marked hyperglycemia (HCC)- (present on admission)  Assessment & Plan  insulin sliding scale with serial Accu-Checks  A1c 12.4  Hypoglycemic protocol in place  Increased Lantus, uncontrolled   Improved    Acute esophagitis- (present on admission)  Assessment & Plan  Continue Protonix         VTE prophylaxis: VTE Selection    I have performed a  physical exam and reviewed and updated ROS and Plan today (6/4/2024). In review of yesterday's note (6/3/2024), there are no changes except as documented above.

## 2024-06-05 NOTE — PROGRESS NOTES
4 Eyes Skin Assessment Completed by Regina RN and PENNIE Corbin.    Head WDL  Ears WDL  Nose Redness and Blanching  Mouth WDL  Neck hemovac drain with dressing in place: clean dry and intact  Breast/Chest WDL  Shoulder Blades WDL  Spine WDL  (R) Arm/Elbow/Hand Bruising;   (L) Arm/Elbow/Hand Scab redness and blanching  Abdomen WDL  Groin WDL  Scrotum/Coccyx/Buttocks redness  (R) Leg Scab  (L) Leg Scab  (R) Heel/Foot/Toe dry and flaky heel  (L) Heel/Foot/Toe dry and flaky heel          Devices In Places Blood Pressure Cuff, Ortho Boot/Shoe, and Oxy Mask      Interventions In Place Heel Mepilex    Possible Skin Injury No    Pictures Uploaded Into Epic Yes  Wound Consult Placed N/A  RN Wound Prevention Protocol Ordered Yes

## 2024-06-05 NOTE — OR SURGEON
Immediate Post OP Note    PreOp Diagnosis: Cervical spondylotic myelopathy      PostOp Diagnosis: Same      Procedure(s):  C4-C7 ANTERIOR CERVICAL DISC AND FUSION - Wound Class: Clean    Surgeon(s):  Moo Pulido III, M.D.    Anesthesiologist/Type of Anesthesia:  Anesthesiologist: Katey Winn M.D.; Shaun Glaser M.D./General    Surgical Staff:  Circulator: Sonam English R.N.; Yanelis Villalba R.N.  Relief Circulator: Cecilia Giraldo R.NOlga; Thomas Holt  Scrub Person: Ace Hearn  First Assist: Pattie Mckinney P.A.-C.  Radiology Technologist: Sagrario Beard    Specimens removed if any:  * No specimens in log *    Estimated Blood Loss: 25cc    Findings: C4-7DDD, patient tolerated well.    Complications: None, see full op report.        6/4/2024 5:24 PM Pattie Mckinney P.A.-C.

## 2024-06-05 NOTE — PROGRESS NOTES
Neurosurgery Progress Note    Subjective:  57 yo man presented with a fall about one week ago with resultant increased right foot weakness. MRI shows critical L4-5 spondylosis and stenosis. Has a history of a prior L5-S1 onlay fusion 30 years ago.     Reporting back pain and right greater than left lower extremity radiculopathy.   Right foot weakness requiring AFO brace. Has bene having loss of bowels for several months.    Lumbar x-rays  show multilevel spondylosis with grade 1 retrolisthesis of L2 on L3 and L3 on L4. No dynamic instability.  Cervical MRI  shows multilevel severe central canal stenosis C4-7 with possible cord signal change C4-6.    POD#1 C4-7 ACDF.  Doing fine, denies significant UE pain, has some neck pain as expected.  Swallowing ok.  Drain with 60 cc output last 8 hours.       Exam:  Bilateral interossei wasting, motor 3+/5 bilateral triceps, 4-/5 bilateral interossei.  Motor right EHL/AT 4/5, limited due to flat foot on the left with 4/5, othwerise 5/5 in bilateral lower extremities.   Sensation intact except distally in the lower extremities  Anterior cervical dressing c/d/I.  Aspen cervical collar in place.  Voice fine.     BP  Min: 95/77  Max: 157/96  Pulse  Av.5  Min: 86  Max: 93  Resp  Av.3  Min: 7  Max: 20  Temp  Av.4 °C (97.5 °F)  Min: 35.6 °C (96 °F)  Max: 36.7 °C (98 °F)  SpO2  Av.9 %  Min: 91 %  Max: 100 %    No data recorded    Recent Labs     24  0053   WBC 5.6   RBC 3.90*   HEMOGLOBIN 11.0*   HEMATOCRIT 34.5*   MCV 88.5   MCH 28.2   MCHC 31.9*   RDW 44.1   PLATELETCT 231   MPV 8.9*     Recent Labs     24  0053   SODIUM 142   POTASSIUM 4.2   CHLORIDE 104   CO2 30   GLUCOSE 152*   BUN 27*   CREATININE 0.74   CALCIUM 8.3*                 Intake/Output                         24 - 24 0659 24 - 24 Total [id number] Total                 Intake    P.O.  --  120 120  --  -- --     P.O. -- 120 120 -- -- --    I.V.  1000  -- 1000  --  -- --    Volume (mL) (Lactated Ringers) 1000 -- 1000 -- -- --    Total Intake 0486 812 0411 -- -- --       Output    Urine  --  -- --  --  -- --    Number of Times Voided -- 1 x 1 x -- -- --    Drains  --  80 80  --  -- --    Output (mL) (Closed/Suction Drain 1 Anterior Neck Hemovac) -- 80 80 -- -- --    Stool  --  -- --  --  -- --    Number of Times Stooled 1 x 1 x 2 x -- -- --    Total Output -- 80 80 -- -- --       Net I/O     1000 40 1040 -- -- --              Intake/Output Summary (Last 24 hours) at 6/5/2024 0836  Last data filed at 6/5/2024 0416  Gross per 24 hour   Intake 1120 ml   Output 80 ml   Net 1040 ml             Pharmacy Consult Request  1 Each PHARMACY TO DOSE    MD ALERT...DO NOT ADMINISTER NSAIDS or ASPIRIN unless ORDERED By Neurosurgery  1 Each PRN    docusate sodium  100 mg BID    senna-docusate  1 Tablet Nightly    senna-docusate  1 Tablet Q24HRS PRN    polyethylene glycol/lytes  1 Packet BID PRN    magnesium hydroxide  30 mL QDAY PRN    bisacodyl  10 mg Q24HRS PRN    sodium phosphate  1 Each Once PRN    enoxaparin (LOVENOX) injection  40 mg DAILY AT 1800    ceFAZolin  1 g Q8HR    diphenhydrAMINE  25 mg Q6HRS PRN    Or    diphenhydrAMINE  25 mg Q6HRS PRN    ondansetron  4 mg Q4HRS PRN    ondansetron  4 mg Q4HRS PRN    promethazine  12.5-25 mg Q4HRS PRN    promethazine  12.5-25 mg Q4HRS PRN    prochlorperazine  5-10 mg Q4HRS PRN    oxyCODONE immediate-release  5 mg Q3HRS PRN    Or    oxyCODONE immediate-release  10 mg Q3HRS PRN    Or    HYDROmorphone  0.5 mg Q3HRS PRN    lidocaine  2 Patch Q24HR    venlafaxine XR  75 mg QDAY with Breakfast    cyanocobalamin  1,000 mcg DAILY    vitamin D3  1,000 Units DAILY    melatonin  5 mg HS PRN    acetaminophen  650 mg Q6HRS PRN    insulin regular  2-9 Units 4X/DAY ACHS    And    dextrose bolus  25 g Q15 MIN PRN    metoclopramide  10 mg TID PRN    omeprazole  20 mg BID    insulin GLARGINE  25 Units BID        Assessment and Plan:  Hospital day # 10  POD# 1 C4-7 ACDF.  Planning for redo L5 and L4 laminectomy with possible stealth fusion Friday.  Leave drain today, ok to d/c if output 30 cc or less in 8 hour period.  Ice incision regularly.  Ok for regular diet as tolerated.  Will likely need rehab after surgeries all completed.  Appreciate hospitalist care.   Following    Chemical prophylactic DVT therapy: Yes - Lovenox 40mg/qd    Start date/time: now.      Brain Compression: No

## 2024-06-05 NOTE — THERAPY
Occupational Therapy  Re-Evaluation and Daily Treatment     Patient Name: Chrisotph Taylor  Age:  59 y.o., Sex:  male  Medical Record #: [id number]  Today's Date: 6/5/2024     Precautions  Precautions: (P) Fall Risk, Cervical Collar  , Spinal / Back Precautions   Comments: (P) L AFO in room, c-collar AAT except showers and meals per order    Assessment    Pt seen for OT re-eval now that he is s/p C4-7 ACDF on 6/4. Pt is also scheduled for redo L4-5 laminectomy and possible fusion Friday 6/7. OT POC modified accordingly now that pt is s/p spine surgery, goals set to reflect new functional status and anticipated progression pending upcoming lumbar surgery. During OT eval, pt was receptive to all education and training provided for spinal precautions in the context of ADLs, transfers and related functional mobility; handout provided and reviewed. Pt demo's UE weakness with ADLs. Has adequate tailor sit to manage distal LBD though has difficulty donning/doffing shoes and L AFO due to pain and poor fit of shoes. Pt declined further mobility than STS and side steps due to pain at this time. Demo'd poor static and dynamic standing balance with HHA and pt attempting to reach to the wall for support, ataxic BLEs with side stepping. Encouraged pt to continue mobilizing with nursing and to be in the chair 3x/day for meals to progress OOB tolerance. Pt would benefit from ADL AE training to progress independence with donning/doffing shoes    Plan    Treatment Plan Status: (P) Modify Current Treatment Plan  Type of Treatment: (P) Self Care / Activities of Daily Living, Adaptive Equipment, Neuro Re-Education / Balance, Therapeutic Exercises, Therapeutic Activity, Orthotics Treatment  Treatment Frequency: (P) 4 Times per Week  Treatment Duration: (P) Until Therapy Goals Met    DC Equipment Recommendations: (P) Unable to determine at this time  Discharge Recommendations: (P) Recommend post-acute placement for additional occupational  "therapy services prior to discharge home    Subjective    \"I was able to walk to the bathroom with nursing.\"     Objective     06/05/24 1624   Precautions   Precautions Fall Risk;Cervical Collar  ;Spinal / Back Precautions    Comments L AFO in room, c-collar AAT except showers and meals per order   Pain   Intervention Repositioned;Nurse Notified   Pain 0 - 10 Group   Location Neck   Location Orientation Mid   Pain Rating Scale (NPRS) 8   Description Aching   Comfort Goal Comfort with Movement;Perform Activity   Non Verbal Descriptors   Non Verbal Scale  Grimacing   Cognition    Level of Consciousness Alert   Comments Pleasant and cooperative, receptive to education   Strength Upper Body   Upper Body Strength  X   Gross Strength Generalized Weakness, Equal Bilaterally.    Sensation Upper Body   Upper Extremity Sensation  X   Comments Reports bilateral numbness tingling, baseline and reports unchanged from sx   Upper Body Muscle Tone   Upper Body Muscle Tone  WDL   Other Treatments   Other Treatments Provided Education on spinal precautions in the context of ADLs and related mobility, handout provided. Verbal education on c-collar mgmt, unable to trial pt managing due to pain limitations   Balance   Sitting Balance (Static) Fair   Sitting Balance (Dynamic) Fair -   Standing Balance (Static) Poor +   Standing Balance (Dynamic) Poor   Weight Shift Sitting Fair   Weight Shift Standing Poor   Skilled Intervention Tactile Cuing;Verbal Cuing   Comments HHA for side steps EOB   Bed Mobility    Supine to Sit Contact Guard Assist   Sit to Supine Contact Guard Assist   Scooting Standby Assist   Rolling Standby Assist   Skilled Intervention Verbal Cuing   Comments HOB flat, education for log roll and positioning in bed for spinal precautions   Activities of Daily Living   Upper Body Dressing Minimal Assist  (gown change, c-collar mgmt not trialed due to pain)   Lower Body Dressing Moderate Assist  (shoe/AFO mgmt in tailor sit " EOB)   Toileting   (declined need)   Skilled Intervention Sequencing;Verbal Cuing;Compensatory Strategies   How much help from another person does the patient currently need...   Putting on and taking off regular lower body clothing? 2   Bathing (including washing, rinsing, and drying)? 2   Toileting, which includes using a toilet, bedpan, or urinal? 2   Putting on and taking off regular upper body clothing? 3   Taking care of personal grooming such as brushing teeth? 3   Eating meals? 3   6 Clicks Daily Activity Score 15   Functional Mobility   Sit to Stand Minimal Assist   Toilet Transfers Refused  (due to pain)   Transfer Method Stand Step   Mobility EOB, STS and side steps with HHA   Activity Tolerance   Sitting Edge of Bed 10+ min   Standing <1 min   Comments limited by pain, weakness, and balance   Patient / Family Goals   Patient / Family Goal #1 To have less pain   Short Term Goals   Short Term Goal # 1 Pt will transfer to toilet with SBA and use of AE PRN   Short Term Goal # 2 Pt will perform toileting with SBA and use of AE PRN   Short Term Goal # 3 Pt will manage c-collar doffing/donning and changing pads with Keysha   Short Term Goal # 4 Pt will perform LBD with Keysha and good adherence to spinal precautions, use of AE PRN   Education Group   Education Provided Spinal Precautions;Pathology of bedrest   Role of Occupational Therapist Patient Response Patient;Acceptance;Explanation;Verbal Demonstration   Spinal Precautions Patient Response Patient;Acceptance;Explanation;Demonstration;Handout;Verbal Demonstration;Action Demonstration;Reinforcement Needed   ADL Patient Response Patient;Acceptance;Explanation;Demonstration;Handout;Verbal Demonstration;Action Demonstration;Reinforcement Needed   Pathology of Bedrest Patient Response Patient;Acceptance;Explanation;Verbal Demonstration;Reinforcement Needed  (education to continue mobilizing and getting up to chair 3x/day with nursing assistance)   Occupational  Therapy Treatment Plan    O.T. Treatment Plan Modify Current Treatment Plan   Treatment Interventions Self Care / Activities of Daily Living;Adaptive Equipment;Neuro Re-Education / Balance;Therapeutic Exercises;Therapeutic Activity;Orthotics Treatment   Treatment Frequency 4 Times per Week   Duration Until Therapy Goals Met   Anticipated Discharge Equipment and Recommendations   DC Equipment Recommendations Unable to determine at this time   Discharge Recommendations Recommend post-acute placement for additional occupational therapy services prior to discharge home   Interdisciplinary Plan of Care Collaboration   IDT Collaboration with  Nursing   Patient Position at End of Therapy In Bed;Bed Alarm On;Call Light within Reach;Tray Table within Reach;Phone within Reach   Collaboration Comments RN updated   Session Information   Date / Session Number  6/5 #1 (1/4, 6/11)  (re-eval completed 6/5 s/p ACDF)

## 2024-06-06 LAB
GLUCOSE BLD STRIP.AUTO-MCNC: 141 MG/DL (ref 65–99)
GLUCOSE BLD STRIP.AUTO-MCNC: 174 MG/DL (ref 65–99)
GLUCOSE BLD STRIP.AUTO-MCNC: 214 MG/DL (ref 65–99)
GLUCOSE BLD STRIP.AUTO-MCNC: 71 MG/DL (ref 65–99)
GLUCOSE BLD STRIP.AUTO-MCNC: 81 MG/DL (ref 65–99)

## 2024-06-06 PROCEDURE — 82962 GLUCOSE BLOOD TEST: CPT | Mod: 91

## 2024-06-06 PROCEDURE — 700102 HCHG RX REV CODE 250 W/ 637 OVERRIDE(OP): Performed by: INTERNAL MEDICINE

## 2024-06-06 PROCEDURE — A9270 NON-COVERED ITEM OR SERVICE: HCPCS

## 2024-06-06 PROCEDURE — 770006 HCHG ROOM/CARE - MED/SURG/GYN SEMI*

## 2024-06-06 PROCEDURE — 700111 HCHG RX REV CODE 636 W/ 250 OVERRIDE (IP)

## 2024-06-06 PROCEDURE — 700102 HCHG RX REV CODE 250 W/ 637 OVERRIDE(OP)

## 2024-06-06 PROCEDURE — A9270 NON-COVERED ITEM OR SERVICE: HCPCS | Performed by: HOSPITALIST

## 2024-06-06 PROCEDURE — 97602 WOUND(S) CARE NON-SELECTIVE: CPT

## 2024-06-06 PROCEDURE — 700102 HCHG RX REV CODE 250 W/ 637 OVERRIDE(OP): Performed by: HOSPITALIST

## 2024-06-06 PROCEDURE — 99231 SBSQ HOSP IP/OBS SF/LOW 25: CPT | Performed by: HOSPITALIST

## 2024-06-06 PROCEDURE — A9270 NON-COVERED ITEM OR SERVICE: HCPCS | Performed by: INTERNAL MEDICINE

## 2024-06-06 RX ADMIN — Medication 1000 UNITS: at 04:47

## 2024-06-06 RX ADMIN — OXYCODONE HYDROCHLORIDE 10 MG: 10 TABLET ORAL at 08:01

## 2024-06-06 RX ADMIN — HYDROMORPHONE HYDROCHLORIDE 0.5 MG: 1 INJECTION, SOLUTION INTRAMUSCULAR; INTRAVENOUS; SUBCUTANEOUS at 01:04

## 2024-06-06 RX ADMIN — VENLAFAXINE HYDROCHLORIDE 75 MG: 75 CAPSULE, EXTENDED RELEASE ORAL at 08:01

## 2024-06-06 RX ADMIN — OMEPRAZOLE 20 MG: 20 CAPSULE, DELAYED RELEASE ORAL at 04:47

## 2024-06-06 RX ADMIN — INSULIN GLARGINE-YFGN 15 UNITS: 100 INJECTION, SOLUTION SUBCUTANEOUS at 18:47

## 2024-06-06 RX ADMIN — CYANOCOBALAMIN TAB 500 MCG 1000 MCG: 500 TAB at 04:47

## 2024-06-06 RX ADMIN — HYDROMORPHONE HYDROCHLORIDE 0.5 MG: 1 INJECTION, SOLUTION INTRAMUSCULAR; INTRAVENOUS; SUBCUTANEOUS at 06:01

## 2024-06-06 RX ADMIN — INSULIN HUMAN 3 UNITS: 100 INJECTION, SOLUTION PARENTERAL at 21:38

## 2024-06-06 RX ADMIN — OXYCODONE HYDROCHLORIDE 10 MG: 10 TABLET ORAL at 04:47

## 2024-06-06 RX ADMIN — INSULIN HUMAN 2 UNITS: 100 INJECTION, SOLUTION PARENTERAL at 18:48

## 2024-06-06 ASSESSMENT — ENCOUNTER SYMPTOMS
BACK PAIN: 1
ABDOMINAL PAIN: 0
CHILLS: 0
PALPITATIONS: 0
NECK PAIN: 1
SENSORY CHANGE: 1
LOSS OF CONSCIOUSNESS: 0
DIZZINESS: 0
HEADACHES: 0
SHORTNESS OF BREATH: 0
COUGH: 0
DIARRHEA: 0
NAUSEA: 0
FEVER: 0
VOMITING: 0

## 2024-06-06 ASSESSMENT — PAIN DESCRIPTION - PAIN TYPE
TYPE: ACUTE PAIN
TYPE: SURGICAL PAIN

## 2024-06-06 NOTE — CARE PLAN
The patient is Stable - Low risk of patient condition declining or worsening    Shift Goals  Clinical Goals: pain management, monitor hemovac  Patient Goals: pain control, rest  Family Goals: vicente      Problem: Pain - Standard  Goal: Alleviation of pain or a reduction in pain to the patient’s comfort goal  Outcome: Progressing     Problem: Knowledge Deficit - Standard  Goal: Patient and family/care givers will demonstrate understanding of plan of care, disease process/condition, diagnostic tests and medications  Outcome: Progressing     Problem: Provide Safe Environment  Goal: Suicide environmental safety, protocols, policies, and practices will be implemented  Outcome: Progressing     Problem: Psychosocial  Goal: Patient's ability to identify and develop effective coping behaviors will improve  Outcome: Progressing  Goal: Patient's ability to identify and utilize available support systems will improve  Outcome: Progressing     Problem: Fall Risk  Goal: Patient will remain free from falls  Outcome: Progressing     Problem: Depression  Goal: Patient and family/caregiver will verbalize accurate information about at least two of the possible causes of depression, three-four of the signs and symptoms of depression  Outcome: Progressing     Problem: Skin Integrity  Goal: Skin integrity is maintained or improved  Outcome: Progressing

## 2024-06-06 NOTE — PROGRESS NOTES
Hospital Medicine Daily Progress Note    Date of Service  6/6/2024    Chief Complaint  Christoph Taylor is a 59 y.o. male admitted 5/27/2024 with GLF    Hospital Course  60yo PMHx Dm, esophagitis.  Presented after a GLF and found to have a pneumonia.  MRI showing severe spinal stenosis.  Taken to the OR on 6/4 for ant fusion    Interval Problem Update  Neck feels a little better today, no other complaints    DW Neuro Srgry    I have discussed this patient's plan of care and discharge plan at IDT rounds today with Case Management, Nursing, Nursing leadership, and other members of the IDT team.    Consultants/Specialty  neurosurgery    Code Status  Full Code    Disposition  The patient is not medically cleared for discharge to home or a post-acute facility.      I have placed the appropriate orders for post-discharge needs.    Review of Systems  Review of Systems   Unable to perform ROS: Other   Constitutional:  Negative for chills and fever.   Respiratory:  Negative for cough and shortness of breath.    Cardiovascular:  Negative for chest pain, palpitations and leg swelling.   Gastrointestinal:  Negative for abdominal pain, diarrhea, nausea and vomiting.   Genitourinary:  Negative for dysuria and urgency.   Musculoskeletal:  Positive for back pain and neck pain.   Skin:  Negative for rash.   Neurological:  Positive for sensory change. Negative for dizziness, loss of consciousness and headaches.        Physical Exam  Temp:  [36.1 °C (97 °F)-36.6 °C (97.8 °F)] 36.6 °C (97.8 °F)  Pulse:  [] 100  Resp:  [16-19] 18  BP: ()/(54-80) 143/80  SpO2:  [93 %-99 %] 96 %    Physical Exam  Constitutional:       General: He is not in acute distress.     Appearance: He is well-developed. He is not diaphoretic.   HENT:      Head: Normocephalic and atraumatic.   Eyes:      Conjunctiva/sclera: Conjunctivae normal.   Neck:      Vascular: No JVD.      Comments: In hard collar  Post op dressing in place  Cardiovascular:       Rate and Rhythm: Normal rate.      Heart sounds: No murmur heard.     No gallop.   Pulmonary:      Effort: Pulmonary effort is normal. No respiratory distress.      Breath sounds: No stridor. No wheezing or rales.   Abdominal:      Palpations: Abdomen is soft.      Tenderness: There is no abdominal tenderness. There is no guarding or rebound.   Skin:     General: Skin is warm and dry.      Findings: No rash.   Neurological:      Mental Status: He is oriented to person, place, and time.      Comments: Antigravity strength x 4.  Sensation intact   Psychiatric:         Mood and Affect: Mood normal.         Behavior: Behavior normal.         Thought Content: Thought content normal.         Fluids    Intake/Output Summary (Last 24 hours) at 6/6/2024 0657  Last data filed at 6/5/2024 1920  Gross per 24 hour   Intake 960 ml   Output 530 ml   Net 430 ml       Laboratory  Recent Labs     06/04/24  0053   WBC 5.6   RBC 3.90*   HEMOGLOBIN 11.0*   HEMATOCRIT 34.5*   MCV 88.5   MCH 28.2   MCHC 31.9*   RDW 44.1   PLATELETCT 231   MPV 8.9*     Recent Labs     06/04/24  0053   SODIUM 142   POTASSIUM 4.2   CHLORIDE 104   CO2 30   GLUCOSE 152*   BUN 27*   CREATININE 0.74   CALCIUM 8.3*                   Imaging  DX-CERVICAL SPINE-2 OR 3 VIEWS   Final Result      Digitized intraoperative radiograph is submitted for review. This examination is not for diagnostic purpose but for guidance during a surgical procedure. Please see the patient's chart for full procedural details.         INTERPRETING LOCATION: [address] [place] [address], [id number]      DX-PORTABLE FLUORO > 1 HOUR   Final Result      Portable fluoroscopy utilized for 8 seconds.         INTERPRETING LOCATION: [address] [place] [address], [id number]      MR-CERVICAL SPINE-W/O   Final Result      1.  Multilevel multifactorial degenerative changes   2.  High-grade central canal narrowing at C4-C5, C5-C6 and C6-C7 with findings suspicious for myelomalacia at C4-C6   3.  Other areas of central canal  and neural foraminal narrowing as described above      DX-LUMBAR SPINE-BEND OR FLEX-EXT   Final Result      L1-L4 degenerative retrolisthesis without definite significant hypermobility.      DX-LUMBAR SPINE-2 OR 3 VIEWS   Final Result      1.  Multilevel degenerative changes of the lumbar spine.   2.  Mild grade 1 retrolisthesis visible with extension maneuver at L2-3 and L3-4 which could indicate hypermobility.      MR-THORACIC SPINE-WITH & W/O   Final Result         Remote endplate compression fractures at T1, T2, T12 without significant posterior cortical retropulsion or canal stenosis.      Mild degenerative changes of the thoracic spine without significant canal stenosis or foraminal narrowing.         MR-LUMBAR SPINE-WITH & W/O   Final Result         Severe canal stenosis at L4-5 with cauda equina compression. There is also severe right foraminal narrowing.      Postoperative changes at L5-S1 with a well decompressed canal.      Remote compression fracture involving the superior endplate of T12 with 50% loss of height.      DX-FOOT-2- LEFT   Final Result      No acute osseous abnormality.      Prominent degenerative changes within the mid and hindfoot with pes planus type deformity.      CT-LSPINE W/O PLUS RECONS   Final Result         1.  No acute traumatic bony injury of the lumbar spine.      CT-TSPINE W/O PLUS RECONS   Final Result         1.  No acute traumatic bony injury of the thoracic spine.      CT-CHEST,ABDOMEN,PELVIS WITH   Final Result           Assessment/Plan  * Pneumonia due to infectious organism- (present on admission)  Assessment & Plan  Bilateral infiltrates on CT scan  Follow-up blood cultures and sputum cultures neg  ceftriaxone and doxycycline completed 6/2    Chronic renal insufficiency  Assessment & Plan  Creat baseline  Follow daily BMP  Renally dose medications as appropriate    Cauda equina compression (HCC)  Assessment & Plan  Srgry following  L spine decompression planned for  tomorrow  PT/OT  Physiatry consult    Cervical stenosis of spinal canal  Assessment & Plan  S/p ant fusion 6/4  PT   OT  Follow neuro exam  Neuro surgery following  Supportive care    Fall  Assessment & Plan  Patient did not lose consciousness or hit his head  Complains of neck pain, back pain and hip pain  PT OT eval    Acute respiratory failure with hypoxia (HCC)- (present on admission)  Assessment & Plan  Now on RA/1 LNC.  Desaturates while asleep to upper 70s: suspect undiagnosed sleep apnea.  Qhs O2, outpt referal for sleep study  IS  O2/RT protocols  Mobilize as able  Weaning down to baseline    Thoracic back pain- (present on admission)  Assessment & Plan  Critical spinal stenosis of both C and L spines  Cont supportive care while definitive surgeries are completed    Diabetes mellitus type 2, insulin dependent, poorly controlled with marked hyperglycemia (HCC)- (present on admission)  Assessment & Plan  insulin sliding scale with serial Accu-Checks  A1c 12.4  Hypoglycemic protocol in place  BG has been easily controled to low 100s in house on current regimen      Acute esophagitis- (present on admission)  Assessment & Plan  Continue Protonix         VTE prophylaxis: VTE Selection    I have performed a physical exam and reviewed and updated ROS and Plan today (6/6/2024). In review of yesterday's note (6/5/2024), there are no changes except as documented above.

## 2024-06-06 NOTE — THERAPY
06/06/24 0713   Interdisciplinary Plan of Care Collaboration   Collaboration Comments Defer PT pending re do lumbar sx possibly tomorrow 6/7/2024.

## 2024-06-06 NOTE — PROGRESS NOTES
Hemovac drain removed per MD order. Gauze and tegaderm placed over site, pt tolerated well. TAP system and QUETA pump in place.

## 2024-06-06 NOTE — PROGRESS NOTES
Assumed care of patient at shift change. Patient is A&Ox4, on RA/intermittently on 2L oxymask while sleeping, reports pain 7/10 to posterior neck - medicated at this time per MAR order with 10mg oxycodone. Assessment completed, personal belongings and call light within reach within reach. Bed alarm on. Plan for shift includes pain control, ambulation as tolerated - patient aware and agreeable to plan. Will monitor.

## 2024-06-06 NOTE — WOUND TEAM
[facility]  Inpatient Services  Initial Wound and Skin Care Evaluation    Admission Date: 5/27/2024     Last order of IP CONSULT TO WOUND CARE was found on 6/5/2024 from Hospital Encounter on 5/27/2024     HPI, PMH, SH: Reviewed    Past Surgical History:   Procedure Laterality Date    CERVICAL DISK AND FUSION ANTERIOR N/A 6/4/2024    Procedure: C4-C7 ANTERIOR CERVICAL DISC AND FUSION;  Surgeon: Moo Pulido III, M.D.;  Location: SURGERY [facility];  Service: Neurosurgery    FINGER OR HAND INCISION AND DRAINAGE Right 6/12/2023    Procedure: INCISION AND DRAINAGE, HAND - THUMB;  Surgeon: Ace Shaw M.D.;  Location: SURGERY [facility];  Service: Orthopedics    IL UPPER GI ENDOSCOPY,DIAGNOSIS N/A 3/14/2023    Procedure: GASTROSCOPY;  Surgeon: Atilio Freed M.D.;  Location: SURGERY [facility];  Service: Gastroenterology    OTHER      appy, lumbar fusion     Social History     Tobacco Use    Smoking status: Former    Smokeless tobacco: Never   Substance Use Topics    Alcohol use: Not Currently     Chief Complaint   Patient presents with    Back Pain    Suicidal Ideation     Diagnosis: Pneumonia due to infectious organism [J18.9]    Unit where seen by Wound Team: S630/01     WOUND CONSULT RELATED TO:  Sacrum    WOUND TEAM PLAN OF CARE - Frequency of Follow-up:   Nursing to follow dressing orders written for wound care. Contact wound team if area fails to progress, deteriorates or with any questions/concerns if something comes up before next scheduled follow up (See below as to whether wound is following and frequency of wound follow up)   Bi-Monthly - Sacrum    WOUND HISTORY:   Chief Complaint  Christoph Taylor is a 59 y.o. male admitted 5/27/2024 with GLF     Hospital Course  60yo PMHx Dm, esophagitis.  Presented after a GLF and found to have a pneumonia.  MRI showing severe spinal stenosis.  Taken to the OR on 6/4 for ant fusion     Interval Problem Update  Neck feels a little better  today, no other complaints     DW Neuro Srgry     I have discussed this patient's plan of care and discharge plan at IDT rounds today with Case Management, Nursing, Nursing leadership, and other members of the IDT team.     Consultants/Specialty  neurosurgery     Code Status  Full Code     Disposition  The patient is not medically cleared for discharge to home or a post-acute facility.       WOUND ASSESSMENT/LDA  Moisture Associated Skin Damage 06/06/24 Buttock;Perineum (Active)   First Observed Date/First Observed Time: 06/06/24 1251   Wound Location : Buttock;Perineum      Assessments 6/6/2024 12:00 PM   Wound Image     NEXT Weekly Photo (Inpatient Only) 06/13/24   Drainage Amount None   Periwound Assessment Pink;Red;Denuded;Blanchable erythema   IAD Cleansing Foam Cleanser/Washcloth   Periwound Protectant Barrier Paste   IAD Containment Device None   Length (cm) 3   Width (cm) 0.5   WOUND NURSE ONLY - Time Spent with Patient (mins) 30        Vascular:    MARCO A:   No results found.    Lab Values:    Lab Results   Component Value Date/Time    WBC 5.6 06/04/2024 12:53 AM    RBC 3.90 (L) 06/04/2024 12:53 AM    HEMOGLOBIN 11.0 (L) 06/04/2024 12:53 AM    HEMATOCRIT 34.5 (L) 06/04/2024 12:53 AM    CREACTPROT <0.30 05/22/2024 09:09 AM    SEDRATEWES 19 05/22/2024 09:09 AM    HBA1C 12.4 (H) 05/27/2024 07:50 PM         Culture Results show:  No results found for this or any previous visit (from the past 720 hour(s)).    Pain Level/Medicated:  None, Tolerated without pain medication       INTERVENTIONS BY WOUND TEAM:  Chart and images reviewed. Discussed with bedside RN. All areas of concern (based on picture review, LDA review and discussion with bedside RN) have been thoroughly assessed. Documentation of areas based on significant findings. This RN in to assess patient. Performed standard wound care which includes appropriate positioning, dressing removal and non-selective debridement. Pictures and measurements obtained  weekly if/when required.    Wound:  Gluteal Cleft - suspected moisture dermatitis  Preparation for Dressing removal: Open to air  Cleansed/Non-selectively Debrided with:  No rinse foam soap and Moist warm washcloth  Jeni wound: Cleansed with No rinse foam soap and Moist warm washcloth, Prepped with Barrier paste  Primary Dressing:  open to air.    Advanced Wound Care Discharge Planning  Number of Clinicians necessary to complete wound care: 1-2  Is patient requiring IV pain medications for dressing changes:  No   Length of time for dressing change 30 min. (This does not include chart review, pre-medication time, set up, clean up or time spent charting.)    Interdisciplinary consultation: Patient, Bedside RN (Marifer), Unit Leadership Leah Grimm RN .      EVALUATION / RATIONALE FOR TREATMENT:     Date:  06/06/24  Wound Status:  Initial evaluation    Patient with blanching redness to upper gluteal cleft. The patient is incontinent. Cleansed area and applied barrier paste. Notified Primary RN and Charge RN regarding high risk for pressure injury developemnt and need for low air loss pump for bed, TAPS, and prevalon boots.       Goals: Steady decrease in wound area and depth weekly.    NURSING PLAN OF CARE ORDERS:  Skin care: See Skin Care orders    NUTRITION RECOMMENDATIONS   Wound Team Recommendations:  Protein supplements    DIET ORDERS (From admission to next 24h)       Start     Ordered    06/06/24 7742  Diet NPO Restrict to: Sips with Medications  AT MIDNIGHT      Question:  Diet NPO Restrict to:  Answer:  Sips with Medications    06/06/24 0847    06/05/24 0850  Diet Order Diet: Regular  ALL MEALS        Question:  Diet:  Answer:  Regular    06/05/24 0849    05/28/24 1439  Supplements  2X A DAY        Question:  Which Supplement  Answer:  Per RD    05/28/24 1438                    PREVENTATIVE INTERVENTIONS:    Q shift Oscar - performed per nursing policy  Q shift pressure point assessments -  performed per nursing policy    Surface/Positioning  Low Airloss - Ordered  Standard/trauma mattress - Currently in Place  Reposition q 2 hours - Currently in Place  TAPs Turning system - Ordered    Offloading/Redistribution  Heel offloading dressing (Silicone dressing) - Ordered  Heel float boots (Prevalon boot) - Ordered  Float Heels off Bed with Pillows - Currently in Place           Containment/Moisture Prevention    Barrier paste - Currently in Place    Anticipated discharge plans:  TBD        Vac Discharge Needs:  Vac Discharge plan is purely a recommendation from wound team and not a requirement for discharge unless otherwise stated by physician.  Not Applicable Pt not on a wound vac

## 2024-06-06 NOTE — DISCHARGE PLANNING
0834  Agency/Facility Name: [facility]  Spoke To: Radha  Outcome: DPA inquired about pending referral. Per Radha they can not accept Pt INS at this time.

## 2024-06-06 NOTE — PROGRESS NOTES
Neurosurgery Progress Note    Subjective:  59 yo man presented with a fall about one week ago with resultant increased right foot weakness. MRI shows critical L4-5 spondylosis and stenosis. Has a history of a prior L5-S1 onlay fusion 30 years ago.     Reporting back pain and right greater than left lower extremity radiculopathy.   Right foot weakness requiring AFO brace. Has bene having loss of bowels for several months.    Lumbar x-rays  show multilevel spondylosis with grade 1 retrolisthesis of L2 on L3 and L3 on L4. No dynamic instability.  Cervical MRI  shows multilevel severe central canal stenosis C4-7 with possible cord signal change C4-6.    POD#2 C4-7 ACDF.  Doing fine, denies significant UE pain, has some neck pain as expected.  Swallowing ok, but with some discomfort.  Drain with 10 cc output last 8 hours.       Exam:  Bilateral interossei wasting, motor 3+/5 bilateral triceps, 4-/5 bilateral interossei.  Motor right EHL/AT 4/5, limited due to flat foot on the left with 4/5, othwerise 5/5 in bilateral lower extremities.   Sensation intact except distally in the lower extremities  Anterior cervical dressing c/d/I.  Aspen cervical collar in place.  Voice fine.     BP  Min: 103/61  Max: 143/80  Pulse  Av.3  Min: 76  Max: 100  Resp  Av.5  Min: 16  Max: 19  Temp  Av.4 °C (97.5 °F)  Min: 36.1 °C (97 °F)  Max: 36.6 °C (97.8 °F)  SpO2  Av.6 %  Min: 90 %  Max: 99 %    No data recorded    Recent Labs     24  0053   WBC 5.6   RBC 3.90*   HEMOGLOBIN 11.0*   HEMATOCRIT 34.5*   MCV 88.5   MCH 28.2   MCHC 31.9*   RDW 44.1   PLATELETCT 231   MPV 8.9*     Recent Labs     24  0053   SODIUM 142   POTASSIUM 4.2   CHLORIDE 104   CO2 30   GLUCOSE 152*   BUN 27*   CREATININE 0.74   CALCIUM 8.3*                 Intake/Output                         24 07 - 24 0659 24 - 24 Total [id number] 8988-2954 Total                 Intake     P.O.  720  240 960  120  -- 120    P.O. 720 240 960 120 -- 120    Total Intake 720 240 960 120 -- 120       Output    Urine  --  500 500  --  -- --    Number of Times Voided 1 x 2 x 3 x -- -- --    Urine Void (mL) -- 500 500 -- -- --    Drains  30  -- 30  --  -- --    Output (mL) (Closed/Suction Drain 1 Anterior Neck Hemovac) 30 -- 30 -- -- --    Stool  --  -- --  --  -- --    Number of Times Stooled 2 x 1 x 3 x -- -- --    Total Output 30 500 530 -- -- --       Net I/O     690 -260 430 120 -- 120              Intake/Output Summary (Last 24 hours) at 6/6/2024 0854  Last data filed at 6/6/2024 0801  Gross per 24 hour   Intake 1080 ml   Output 530 ml   Net 550 ml             dextrose  250 mL Q15 MIN PRN    Pharmacy Consult Request  1 Each PHARMACY TO DOSE    MD ALERT...DO NOT ADMINISTER NSAIDS or ASPIRIN unless ORDERED By Neurosurgery  1 Each PRN    docusate sodium  100 mg BID    senna-docusate  1 Tablet Nightly    senna-docusate  1 Tablet Q24HRS PRN    polyethylene glycol/lytes  1 Packet BID PRN    magnesium hydroxide  30 mL QDAY PRN    bisacodyl  10 mg Q24HRS PRN    sodium phosphate  1 Each Once PRN    enoxaparin (LOVENOX) injection  40 mg DAILY AT 1800    diphenhydrAMINE  25 mg Q6HRS PRN    Or    diphenhydrAMINE  25 mg Q6HRS PRN    ondansetron  4 mg Q4HRS PRN    ondansetron  4 mg Q4HRS PRN    promethazine  12.5-25 mg Q4HRS PRN    promethazine  12.5-25 mg Q4HRS PRN    prochlorperazine  5-10 mg Q4HRS PRN    oxyCODONE immediate-release  5 mg Q3HRS PRN    Or    oxyCODONE immediate-release  10 mg Q3HRS PRN    Or    HYDROmorphone  0.5 mg Q3HRS PRN    lidocaine  2 Patch Q24HR    venlafaxine XR  75 mg QDAY with Breakfast    cyanocobalamin  1,000 mcg DAILY    vitamin D3  1,000 Units DAILY    melatonin  5 mg HS PRN    acetaminophen  650 mg Q6HRS PRN    insulin regular  2-9 Units 4X/DAY ACHS    metoclopramide  10 mg TID PRN    omeprazole  20 mg BID    insulin GLARGINE  25 Units BID       Assessment and Plan:  Hospital day #  11  POD# 2 C4-7 ACDF.  Planning for redo L5 and L4 laminectomy with possible stealth fusion Friday.  Nursing to d/c anterior cervical hemovac today.   Ice incision regularly.  NPO at midnight tonight, ok for sips with meds.   Will likely need rehab after surgeries all completed.  Appreciate hospitalist care.   Following    Chemical prophylactic DVT therapy: Yes - Lovenox 40mg/qd    Start date/time: now.      Brain Compression: No

## 2024-06-06 NOTE — CARE PLAN
Problem: Pain - Standard  Goal: Alleviation of pain or a reduction in pain to the patient’s comfort goal  Outcome: Progressing     Problem: Fall Risk  Goal: Patient will remain free from falls  Outcome: Progressing   The patient is Watcher - Medium risk of patient condition declining or worsening    Shift Goals  Clinical Goals: pain control, monitor O2 needs  Patient Goals: pain control, rest  Family Goals: vicente    Progress made toward(s) clinical / shift goals:    Pain controlled with prn pain medication per MAR order, bed alarm on     Patient is not progressing towards the following goals:

## 2024-06-07 ENCOUNTER — ANESTHESIA (OUTPATIENT)
Dept: SURGERY | Facility: MEDICAL CENTER | Age: 59
DRG: 459 | End: 2024-06-07
Payer: COMMERCIAL

## 2024-06-07 ENCOUNTER — APPOINTMENT (OUTPATIENT)
Dept: RADIOLOGY | Facility: MEDICAL CENTER | Age: 59
DRG: 459 | End: 2024-06-07
Attending: NEUROLOGICAL SURGERY
Payer: COMMERCIAL

## 2024-06-07 ENCOUNTER — ANESTHESIA EVENT (OUTPATIENT)
Dept: SURGERY | Facility: MEDICAL CENTER | Age: 59
DRG: 459 | End: 2024-06-07
Payer: COMMERCIAL

## 2024-06-07 LAB
ANION GAP SERPL CALC-SCNC: 11 MMOL/L (ref 7–16)
BUN SERPL-MCNC: 36 MG/DL (ref 8–22)
CALCIUM SERPL-MCNC: 8.5 MG/DL (ref 8.5–10.5)
CHLORIDE SERPL-SCNC: 97 MMOL/L (ref 96–112)
CO2 SERPL-SCNC: 31 MMOL/L (ref 20–33)
CREAT SERPL-MCNC: 0.87 MG/DL (ref 0.5–1.4)
GFR SERPLBLD CREATININE-BSD FMLA CKD-EPI: 99 ML/MIN/1.73 M 2
GLUCOSE BLD STRIP.AUTO-MCNC: 118 MG/DL (ref 65–99)
GLUCOSE BLD STRIP.AUTO-MCNC: 134 MG/DL (ref 65–99)
GLUCOSE BLD STRIP.AUTO-MCNC: 143 MG/DL (ref 65–99)
GLUCOSE BLD STRIP.AUTO-MCNC: 143 MG/DL (ref 65–99)
GLUCOSE BLD STRIP.AUTO-MCNC: 166 MG/DL (ref 65–99)
GLUCOSE SERPL-MCNC: 191 MG/DL (ref 65–99)
POTASSIUM SERPL-SCNC: 4.3 MMOL/L (ref 3.6–5.5)
SODIUM SERPL-SCNC: 139 MMOL/L (ref 135–145)

## 2024-06-07 PROCEDURE — 01NB0ZZ RELEASE LUMBAR NERVE, OPEN APPROACH: ICD-10-PCS | Performed by: NEUROLOGICAL SURGERY

## 2024-06-07 PROCEDURE — 160035 HCHG PACU - 1ST 60 MINS PHASE I: Performed by: NEUROLOGICAL SURGERY

## 2024-06-07 PROCEDURE — 07DR3ZZ EXTRACTION OF ILIAC BONE MARROW, PERCUTANEOUS APPROACH: ICD-10-PCS | Performed by: NEUROLOGICAL SURGERY

## 2024-06-07 PROCEDURE — A9270 NON-COVERED ITEM OR SERVICE: HCPCS | Performed by: HOSPITALIST

## 2024-06-07 PROCEDURE — 80048 BASIC METABOLIC PNL TOTAL CA: CPT

## 2024-06-07 PROCEDURE — 700101 HCHG RX REV CODE 250: Performed by: ANESTHESIOLOGY

## 2024-06-07 PROCEDURE — 700102 HCHG RX REV CODE 250 W/ 637 OVERRIDE(OP): Performed by: HOSPITALIST

## 2024-06-07 PROCEDURE — 160048 HCHG OR STATISTICAL LEVEL 1-5: Performed by: NEUROLOGICAL SURGERY

## 2024-06-07 PROCEDURE — C1713 ANCHOR/SCREW BN/BN,TIS/BN: HCPCS | Performed by: NEUROLOGICAL SURGERY

## 2024-06-07 PROCEDURE — 700102 HCHG RX REV CODE 250 W/ 637 OVERRIDE(OP): Performed by: INTERNAL MEDICINE

## 2024-06-07 PROCEDURE — 700105 HCHG RX REV CODE 258: Performed by: ANESTHESIOLOGY

## 2024-06-07 PROCEDURE — 72100 X-RAY EXAM L-S SPINE 2/3 VWS: CPT

## 2024-06-07 PROCEDURE — 160002 HCHG RECOVERY MINUTES (STAT): Performed by: NEUROLOGICAL SURGERY

## 2024-06-07 PROCEDURE — 00NY0ZZ RELEASE LUMBAR SPINAL CORD, OPEN APPROACH: ICD-10-PCS | Performed by: NEUROLOGICAL SURGERY

## 2024-06-07 PROCEDURE — 700111 HCHG RX REV CODE 636 W/ 250 OVERRIDE (IP): Performed by: NEUROLOGICAL SURGERY

## 2024-06-07 PROCEDURE — A9270 NON-COVERED ITEM OR SERVICE: HCPCS

## 2024-06-07 PROCEDURE — 700101 HCHG RX REV CODE 250

## 2024-06-07 PROCEDURE — 36415 COLL VENOUS BLD VENIPUNCTURE: CPT

## 2024-06-07 PROCEDURE — A9270 NON-COVERED ITEM OR SERVICE: HCPCS | Performed by: INTERNAL MEDICINE

## 2024-06-07 PROCEDURE — 82962 GLUCOSE BLOOD TEST: CPT

## 2024-06-07 PROCEDURE — 700102 HCHG RX REV CODE 250 W/ 637 OVERRIDE(OP)

## 2024-06-07 PROCEDURE — 99232 SBSQ HOSP IP/OBS MODERATE 35: CPT | Performed by: INTERNAL MEDICINE

## 2024-06-07 PROCEDURE — 0SG00AJ FUSION OF LUMBAR VERTEBRAL JOINT WITH INTERBODY FUSION DEVICE, POSTERIOR APPROACH, ANTERIOR COLUMN, OPEN APPROACH: ICD-10-PCS | Performed by: NEUROLOGICAL SURGERY

## 2024-06-07 PROCEDURE — 160009 HCHG ANES TIME/MIN: Performed by: NEUROLOGICAL SURGERY

## 2024-06-07 PROCEDURE — 110454 HCHG SHELL REV 250: Performed by: NEUROLOGICAL SURGERY

## 2024-06-07 PROCEDURE — 160031 HCHG SURGERY MINUTES - 1ST 30 MINS LEVEL 5: Performed by: NEUROLOGICAL SURGERY

## 2024-06-07 PROCEDURE — 700111 HCHG RX REV CODE 636 W/ 250 OVERRIDE (IP): Mod: JZ | Performed by: ANESTHESIOLOGY

## 2024-06-07 PROCEDURE — 160036 HCHG PACU - EA ADDL 30 MINS PHASE I: Performed by: NEUROLOGICAL SURGERY

## 2024-06-07 PROCEDURE — 8E0WXBZ COMPUTER ASSISTED PROCEDURE OF TRUNK REGION: ICD-10-PCS | Performed by: NEUROLOGICAL SURGERY

## 2024-06-07 PROCEDURE — 110371 HCHG SHELL REV 272: Performed by: NEUROLOGICAL SURGERY

## 2024-06-07 PROCEDURE — 160042 HCHG SURGERY MINUTES - EA ADDL 1 MIN LEVEL 5: Performed by: NEUROLOGICAL SURGERY

## 2024-06-07 PROCEDURE — 00QT0ZZ REPAIR SPINAL MENINGES, OPEN APPROACH: ICD-10-PCS | Performed by: NEUROLOGICAL SURGERY

## 2024-06-07 PROCEDURE — 770001 HCHG ROOM/CARE - MED/SURG/GYN PRIV*

## 2024-06-07 PROCEDURE — 700111 HCHG RX REV CODE 636 W/ 250 OVERRIDE (IP): Performed by: ANESTHESIOLOGY

## 2024-06-07 PROCEDURE — 700101 HCHG RX REV CODE 250: Performed by: NEUROLOGICAL SURGERY

## 2024-06-07 PROCEDURE — 00UT07Z SUPPLEMENT SPINAL MENINGES WITH AUTOLOGOUS TISSUE SUBSTITUTE, OPEN APPROACH: ICD-10-PCS | Performed by: NEUROLOGICAL SURGERY

## 2024-06-07 DEVICE — DURASEAL SEALANT SYSTEM 5ML - (5/BX): Type: IMPLANTABLE DEVICE | Site: SPINE LUMBAR | Status: FUNCTIONAL

## 2024-06-07 DEVICE — ROD SPINAL INVICTUS TITANIUM 5.5MM X 45MM (1EA): Type: IMPLANTABLE DEVICE | Site: SPINE LUMBAR | Status: FUNCTIONAL

## 2024-06-07 DEVICE — GRAFT CANCELLOUS CUBES 6-10MM 15CC: Type: IMPLANTABLE DEVICE | Site: SPINE LUMBAR | Status: FUNCTIONAL

## 2024-06-07 DEVICE — SCREW 6.5MM X 50MM POLYAXIAL (1EA): Type: IMPLANTABLE DEVICE | Site: SPINE LUMBAR | Status: FUNCTIONAL

## 2024-06-07 DEVICE — SCREW 7.5MM X 50MM POLYAXIAL (1EA): Type: IMPLANTABLE DEVICE | Site: SPINE LUMBAR | Status: FUNCTIONAL

## 2024-06-07 DEVICE — POWDER SURGICAL 5GM COLLAGEN CELLERATE RX (1EA): Type: IMPLANTABLE DEVICE | Site: SPINE LUMBAR | Status: FUNCTIONAL

## 2024-06-07 DEVICE — SCREW SET INVICTUS: Type: IMPLANTABLE DEVICE | Site: SPINE LUMBAR | Status: FUNCTIONAL

## 2024-06-07 RX ORDER — HYDROMORPHONE HYDROCHLORIDE 1 MG/ML
0.1 INJECTION, SOLUTION INTRAMUSCULAR; INTRAVENOUS; SUBCUTANEOUS
Status: DISCONTINUED | OUTPATIENT
Start: 2024-06-07 | End: 2024-06-07 | Stop reason: HOSPADM

## 2024-06-07 RX ORDER — ONDANSETRON 2 MG/ML
INJECTION INTRAMUSCULAR; INTRAVENOUS PRN
Status: DISCONTINUED | OUTPATIENT
Start: 2024-06-07 | End: 2024-06-07 | Stop reason: SURG

## 2024-06-07 RX ORDER — SODIUM CHLORIDE, SODIUM LACTATE, POTASSIUM CHLORIDE, CALCIUM CHLORIDE 600; 310; 30; 20 MG/100ML; MG/100ML; MG/100ML; MG/100ML
INJECTION, SOLUTION INTRAVENOUS
Status: DISCONTINUED | OUTPATIENT
Start: 2024-06-07 | End: 2024-06-07 | Stop reason: SURG

## 2024-06-07 RX ORDER — HALOPERIDOL 5 MG/ML
2.5 INJECTION INTRAMUSCULAR ONCE
Status: COMPLETED | OUTPATIENT
Start: 2024-06-08 | End: 2024-06-08

## 2024-06-07 RX ORDER — DEXTROSE MONOHYDRATE 25 G/50ML
12.5 INJECTION, SOLUTION INTRAVENOUS
Status: DISCONTINUED | OUTPATIENT
Start: 2024-06-07 | End: 2024-06-07 | Stop reason: HOSPADM

## 2024-06-07 RX ORDER — LIDOCAINE HYDROCHLORIDE 20 MG/ML
INJECTION, SOLUTION EPIDURAL; INFILTRATION; INTRACAUDAL; PERINEURAL PRN
Status: DISCONTINUED | OUTPATIENT
Start: 2024-06-07 | End: 2024-06-07 | Stop reason: SURG

## 2024-06-07 RX ORDER — SODIUM CHLORIDE, SODIUM LACTATE, POTASSIUM CHLORIDE, CALCIUM CHLORIDE 600; 310; 30; 20 MG/100ML; MG/100ML; MG/100ML; MG/100ML
INJECTION, SOLUTION INTRAVENOUS CONTINUOUS
Status: DISCONTINUED | OUTPATIENT
Start: 2024-06-07 | End: 2024-06-07 | Stop reason: HOSPADM

## 2024-06-07 RX ORDER — HALOPERIDOL 5 MG/ML
1 INJECTION INTRAMUSCULAR
Status: DISCONTINUED | OUTPATIENT
Start: 2024-06-07 | End: 2024-06-07 | Stop reason: HOSPADM

## 2024-06-07 RX ORDER — HYDROMORPHONE HYDROCHLORIDE 1 MG/ML
0.2 INJECTION, SOLUTION INTRAMUSCULAR; INTRAVENOUS; SUBCUTANEOUS
Status: DISCONTINUED | OUTPATIENT
Start: 2024-06-07 | End: 2024-06-07 | Stop reason: HOSPADM

## 2024-06-07 RX ORDER — METHOCARBAMOL 750 MG/1
750 TABLET, FILM COATED ORAL EVERY 8 HOURS PRN
Status: DISCONTINUED | OUTPATIENT
Start: 2024-06-08 | End: 2024-06-08

## 2024-06-07 RX ORDER — HYDROMORPHONE HYDROCHLORIDE 1 MG/ML
0.4 INJECTION, SOLUTION INTRAMUSCULAR; INTRAVENOUS; SUBCUTANEOUS
Status: DISCONTINUED | OUTPATIENT
Start: 2024-06-07 | End: 2024-06-07 | Stop reason: HOSPADM

## 2024-06-07 RX ORDER — CEFAZOLIN SODIUM 1 G/3ML
INJECTION, POWDER, FOR SOLUTION INTRAMUSCULAR; INTRAVENOUS
Status: DISCONTINUED | OUTPATIENT
Start: 2024-06-07 | End: 2024-06-07 | Stop reason: HOSPADM

## 2024-06-07 RX ORDER — OXYCODONE HYDROCHLORIDE AND ACETAMINOPHEN 5; 325 MG/1; MG/1
2 TABLET ORAL
Status: DISCONTINUED | OUTPATIENT
Start: 2024-06-07 | End: 2024-06-07 | Stop reason: HOSPADM

## 2024-06-07 RX ORDER — IPRATROPIUM BROMIDE AND ALBUTEROL SULFATE 2.5; .5 MG/3ML; MG/3ML
3 SOLUTION RESPIRATORY (INHALATION)
Status: DISCONTINUED | OUTPATIENT
Start: 2024-06-07 | End: 2024-06-07 | Stop reason: HOSPADM

## 2024-06-07 RX ORDER — MEPERIDINE HYDROCHLORIDE 25 MG/ML
12.5 INJECTION INTRAMUSCULAR; INTRAVENOUS; SUBCUTANEOUS
Status: DISCONTINUED | OUTPATIENT
Start: 2024-06-07 | End: 2024-06-07 | Stop reason: HOSPADM

## 2024-06-07 RX ORDER — SODIUM CHLORIDE, SODIUM LACTATE, POTASSIUM CHLORIDE, CALCIUM CHLORIDE 600; 310; 30; 20 MG/100ML; MG/100ML; MG/100ML; MG/100ML
INJECTION, SOLUTION INTRAVENOUS CONTINUOUS
Status: DISCONTINUED | OUTPATIENT
Start: 2024-06-08 | End: 2024-06-08

## 2024-06-07 RX ORDER — ONDANSETRON 2 MG/ML
4 INJECTION INTRAMUSCULAR; INTRAVENOUS
Status: DISCONTINUED | OUTPATIENT
Start: 2024-06-07 | End: 2024-06-07 | Stop reason: HOSPADM

## 2024-06-07 RX ORDER — METOPROLOL TARTRATE 1 MG/ML
1 INJECTION, SOLUTION INTRAVENOUS
Status: DISCONTINUED | OUTPATIENT
Start: 2024-06-07 | End: 2024-06-07 | Stop reason: HOSPADM

## 2024-06-07 RX ORDER — ROCURONIUM BROMIDE 10 MG/ML
INJECTION, SOLUTION INTRAVENOUS PRN
Status: DISCONTINUED | OUTPATIENT
Start: 2024-06-07 | End: 2024-06-07 | Stop reason: SURG

## 2024-06-07 RX ORDER — OXYCODONE HYDROCHLORIDE AND ACETAMINOPHEN 5; 325 MG/1; MG/1
1 TABLET ORAL
Status: DISCONTINUED | OUTPATIENT
Start: 2024-06-07 | End: 2024-06-07 | Stop reason: HOSPADM

## 2024-06-07 RX ORDER — HYDROMORPHONE HYDROCHLORIDE 2 MG/ML
INJECTION, SOLUTION INTRAMUSCULAR; INTRAVENOUS; SUBCUTANEOUS PRN
Status: DISCONTINUED | OUTPATIENT
Start: 2024-06-07 | End: 2024-06-07 | Stop reason: SURG

## 2024-06-07 RX ORDER — VITAMIN B COMPLEX
2000 TABLET ORAL DAILY
Status: DISCONTINUED | OUTPATIENT
Start: 2024-06-08 | End: 2024-06-11 | Stop reason: HOSPADM

## 2024-06-07 RX ORDER — CEFAZOLIN SODIUM 1 G/3ML
INJECTION, POWDER, FOR SOLUTION INTRAMUSCULAR; INTRAVENOUS PRN
Status: DISCONTINUED | OUTPATIENT
Start: 2024-06-07 | End: 2024-06-07 | Stop reason: SURG

## 2024-06-07 RX ORDER — BUPIVACAINE HYDROCHLORIDE AND EPINEPHRINE 5; 5 MG/ML; UG/ML
INJECTION, SOLUTION PERINEURAL
Status: DISCONTINUED | OUTPATIENT
Start: 2024-06-07 | End: 2024-06-07 | Stop reason: HOSPADM

## 2024-06-07 RX ORDER — BUPIVACAINE HYDROCHLORIDE 5 MG/ML
INJECTION, SOLUTION EPIDURAL; INTRACAUDAL
Status: DISCONTINUED | OUTPATIENT
Start: 2024-06-07 | End: 2024-06-07 | Stop reason: HOSPADM

## 2024-06-07 RX ORDER — HYDRALAZINE HYDROCHLORIDE 20 MG/ML
5 INJECTION INTRAMUSCULAR; INTRAVENOUS
Status: DISCONTINUED | OUTPATIENT
Start: 2024-06-07 | End: 2024-06-07 | Stop reason: HOSPADM

## 2024-06-07 RX ORDER — DIPHENHYDRAMINE HYDROCHLORIDE 50 MG/ML
12.5 INJECTION INTRAMUSCULAR; INTRAVENOUS
Status: DISCONTINUED | OUTPATIENT
Start: 2024-06-07 | End: 2024-06-07 | Stop reason: HOSPADM

## 2024-06-07 RX ADMIN — PROPOFOL 100 MCG/KG/MIN: 10 INJECTION, EMULSION INTRAVENOUS at 14:05

## 2024-06-07 RX ADMIN — OXYCODONE HYDROCHLORIDE 10 MG: 10 TABLET ORAL at 09:33

## 2024-06-07 RX ADMIN — PROPOFOL 150 MG: 10 INJECTION, EMULSION INTRAVENOUS at 14:04

## 2024-06-07 RX ADMIN — OMEPRAZOLE 20 MG: 20 CAPSULE, DELAYED RELEASE ORAL at 05:48

## 2024-06-07 RX ADMIN — ONDANSETRON 4 MG: 2 INJECTION INTRAMUSCULAR; INTRAVENOUS at 15:56

## 2024-06-07 RX ADMIN — CYANOCOBALAMIN TAB 500 MCG 1000 MCG: 500 TAB at 05:49

## 2024-06-07 RX ADMIN — Medication 50 MG: at 14:04

## 2024-06-07 RX ADMIN — PROPOFOL 50 MG: 10 INJECTION, EMULSION INTRAVENOUS at 14:14

## 2024-06-07 RX ADMIN — CEFAZOLIN 2 G: 1 INJECTION, POWDER, FOR SOLUTION INTRAMUSCULAR; INTRAVENOUS at 14:04

## 2024-06-07 RX ADMIN — OXYCODONE HYDROCHLORIDE 10 MG: 10 TABLET ORAL at 02:49

## 2024-06-07 RX ADMIN — ROCURONIUM BROMIDE 50 MG: 50 INJECTION, SOLUTION INTRAVENOUS at 14:04

## 2024-06-07 RX ADMIN — NOREPINEPHRINE BITARTRATE 0.05 MCG/KG/MIN: 1 INJECTION, SOLUTION, CONCENTRATE INTRAVENOUS at 14:05

## 2024-06-07 RX ADMIN — SODIUM CHLORIDE, POTASSIUM CHLORIDE, SODIUM LACTATE AND CALCIUM CHLORIDE: 600; 310; 30; 20 INJECTION, SOLUTION INTRAVENOUS at 13:55

## 2024-06-07 RX ADMIN — Medication 1000 UNITS: at 05:49

## 2024-06-07 RX ADMIN — HALOPERIDOL LACTATE 2.5 MG: 5 INJECTION, SOLUTION INTRAMUSCULAR at 20:24

## 2024-06-07 RX ADMIN — SUGAMMADEX 200 MG: 100 INJECTION, SOLUTION INTRAVENOUS at 16:08

## 2024-06-07 RX ADMIN — SODIUM CHLORIDE, POTASSIUM CHLORIDE, SODIUM LACTATE AND CALCIUM CHLORIDE: 600; 310; 30; 20 INJECTION, SOLUTION INTRAVENOUS at 16:36

## 2024-06-07 RX ADMIN — VENLAFAXINE HYDROCHLORIDE 75 MG: 75 CAPSULE, EXTENDED RELEASE ORAL at 08:23

## 2024-06-07 RX ADMIN — LIDOCAINE 2 PATCH: 4 PATCH TOPICAL at 02:48

## 2024-06-07 RX ADMIN — HYDROMORPHONE HYDROCHLORIDE 2 MG: 2 INJECTION INTRAMUSCULAR; INTRAVENOUS; SUBCUTANEOUS at 13:58

## 2024-06-07 RX ADMIN — LIDOCAINE HYDROCHLORIDE 60 MG: 20 INJECTION, SOLUTION EPIDURAL; INFILTRATION; INTRACAUDAL at 14:04

## 2024-06-07 ASSESSMENT — ENCOUNTER SYMPTOMS
SENSORY CHANGE: 1
LOSS OF CONSCIOUSNESS: 0
BACK PAIN: 1
NECK PAIN: 1
HEADACHES: 0
NAUSEA: 0
PALPITATIONS: 0
CHILLS: 0
DIARRHEA: 0
FEVER: 0
COUGH: 0
VOMITING: 0
SHORTNESS OF BREATH: 0
DIZZINESS: 0
ABDOMINAL PAIN: 0

## 2024-06-07 ASSESSMENT — PAIN DESCRIPTION - PAIN TYPE
TYPE: ACUTE PAIN

## 2024-06-07 ASSESSMENT — PAIN SCALES - GENERAL: PAIN_LEVEL: 0

## 2024-06-07 NOTE — PROGRESS NOTES
Neurosurgery Progress Note    Subjective:  59 yo man presented with a fall about one week ago with resultant increased right foot weakness. MRI shows critical L4-5 spondylosis and stenosis. Has a history of a prior L5-S1 onlay fusion 30 years ago.     Reporting back pain and right greater than left lower extremity radiculopathy.   Right foot weakness requiring AFO brace. Has bene having loss of bowels for several months.    Lumbar x-rays  show multilevel spondylosis with grade 1 retrolisthesis of L2 on L3 and L3 on L4. No dynamic instability.  Cervical MRI 6/ shows multilevel severe central canal stenosis C4-7 with possible cord signal change C4-6.    POD#3 C4-7 ACDF.  Doing fine, denies significant UE pain, has some neck pain as expected.  Swallowing ok, but with some discomfort.  Drain out.     POD#0 redo L5 and L4 laminectomy with possible stealth fusion.       Exam:  Bilateral interossei wasting, motor 3+/5 bilateral triceps, 4-/5 bilateral interossei.  Motor right EHL/AT 4/5, limited due to flat foot on the left with 4/5, othwerise 5/5 in bilateral lower extremities.   Sensation intact except distally in the lower extremities  Anterior cervical dressing c/d/I.  Aspen cervical collar in place.  Voice fine.     BP  Min: 97/53  Max: 118/67  Pulse  Av.8  Min: 96  Max: 100  Resp  Av.7  Min: 16  Max: 18  Temp  Av.5 °C (97.7 °F)  Min: 36.1 °C (97 °F)  Max: 36.9 °C (98.4 °F)  SpO2  Av %  Min: 92 %  Max: 96 %    No data recorded                            Intake/Output                         24 - 24 0659 24 - 24 0659      Total  Total                 Intake    P.O.  240  -- 240  --  -- --    P.O. 240 -- 240 -- -- --    Total Intake 240 -- 240 -- -- --       Output    Urine  --  -- --  --  -- --    Number of Times Voided 1 x 2 x 3 x 1 x -- 1 x    Stool  --  -- --  --  -- --    Number of Times Stooled 1 x 3 x 4 x -- -- --     Total Output -- -- -- -- -- --       Net I/O     240 -- 240 -- -- --              Intake/Output Summary (Last 24 hours) at 6/7/2024 1036  Last data filed at 6/6/2024 1600  Gross per 24 hour   Intake 120 ml   Output --   Net 120 ml             insulin GLARGINE  15 Units BID    dextrose  250 mL Q15 MIN PRN    Pharmacy Consult Request  1 Each PHARMACY TO DOSE    MD ALERT...DO NOT ADMINISTER NSAIDS or ASPIRIN unless ORDERED By Neurosurgery  1 Each PRN    docusate sodium  100 mg BID    senna-docusate  1 Tablet Nightly    senna-docusate  1 Tablet Q24HRS PRN    polyethylene glycol/lytes  1 Packet BID PRN    magnesium hydroxide  30 mL QDAY PRN    bisacodyl  10 mg Q24HRS PRN    sodium phosphate  1 Each Once PRN    [Held by provider] enoxaparin (LOVENOX) injection  40 mg DAILY AT 1800    diphenhydrAMINE  25 mg Q6HRS PRN    Or    diphenhydrAMINE  25 mg Q6HRS PRN    ondansetron  4 mg Q4HRS PRN    ondansetron  4 mg Q4HRS PRN    promethazine  12.5-25 mg Q4HRS PRN    promethazine  12.5-25 mg Q4HRS PRN    prochlorperazine  5-10 mg Q4HRS PRN    oxyCODONE immediate-release  5 mg Q3HRS PRN    Or    oxyCODONE immediate-release  10 mg Q3HRS PRN    Or    HYDROmorphone  0.5 mg Q3HRS PRN    lidocaine  2 Patch Q24HR    venlafaxine XR  75 mg QDAY with Breakfast    cyanocobalamin  1,000 mcg DAILY    vitamin D3  1,000 Units DAILY    melatonin  5 mg HS PRN    acetaminophen  650 mg Q6HRS PRN    insulin regular  2-9 Units 4X/DAY ACHS    metoclopramide  10 mg TID PRN    omeprazole  20 mg BID       Assessment and Plan:  Hospital day # 12  POD#3 C4-7 ACDF.  POD#0 redo L5 and L4 laminectomy with possible stealth fusion Friday.  NPO now.   Lovenox held.   Will likely need rehab after surgeries all completed.  Appreciate hospitalist care.   Following    Chemical prophylactic DVT therapy: Yes - Lovenox 40mg/qd    Start date/time: held now for surgery, restart tomorrow 6/8     Brain Compression: No

## 2024-06-07 NOTE — CARE PLAN
The patient is Stable - Low risk of patient condition declining or worsening    Shift Goals  Clinical Goals: Pain <7  Patient Goals: eat, drink  Family Goals: vicente      Patient is not progressing towards the following goals:      Problem: Psychosocial  Goal: Patient's ability to identify and develop effective coping behaviors will improve  Description: Target End Date:  1 to 3 days    1.  Present opportunities for the patient to express thoughts, and feelings in a nonjudgmental environment  2.  Help the patient with problem-solving in a constructive manner.  3.  Educate the patient on cognitive-behavioral self-management responses to suicidal thoughts.  4.  Introduce the use of self-expression methods to manage suicidal feelings  5.  Provide emotional support  6.  Encourage identification of positive aspects of self  Outcome: Not Progressing     Problem: Skin Integrity  Goal: Skin integrity is maintained or improved  Description: Target End Date:  Prior to discharge or change in level of care    Document interventions on Skin Risk/Oscar flowsheet groups and corresponding LDA    1.  Assess and monitor skin integrity, appearance and/or temperature  2.  Assess risk factors for impaired skin integrity and/or pressures ulcers  3.  Implement precautions to protect skin integrity in collaboration with interdisciplinary team  4.  Implement pressure ulcer prevention protocol if at risk for skin breakdown  5.  Confirm wound care consult if at risk for skin breakdown  6.  Ensure patient use of pressure relieving devices  (Low air loss bed, waffle overlay, heel protectors, ROHO cushion, etc)  Outcome: Not Progressing

## 2024-06-07 NOTE — ANESTHESIA PROCEDURE NOTES
Peripheral IV    Date/Time: 6/7/2024 2:33 PM    Performed by: Zhang Arias M.D.  Authorized by: Zhang Arias M.D.    Size:  18 G  Laterality:  Left  Peripheral IV Location:  Forearm  Site Prep:  Alcohol  Technique:  Direct puncture  Attempts:  2

## 2024-06-07 NOTE — ANESTHESIA TIME REPORT
Anesthesia Start and Stop Event Times       Date Time Event    6/7/2024 1348 Ready for Procedure     1355 Anesthesia Start     1651 Anesthesia Stop          Responsible Staff  06/07/24      Name Role Begin End    Zhang Arias M.D. Anesth 1355 1651          Overtime Reason:  no overtime (within assigned shift)    Comments:

## 2024-06-07 NOTE — ANESTHESIA PREPROCEDURE EVALUATION
Case: [id number] Date/Time: 06/07/24 1345    Procedures:       FUSION, SPINE, LUMBAR, PLIF- LUMBAR 4-5 LAMINECTOMY WITH POSSIBLE FUSION      LAMINECTOMY, SPINE, LUMBAR, WITH DISCECTOMY    Location: [place] [address] / [facility]    Surgeons: Moo Pulido III, M.D.            57y/o M with 1wk h/o fall with R foot weakness. MRI showing critical L4-5 spondylosis and stenosis. Previous h/o L5-S1 fusion.  Hospital day #12  Non-compliant with meds. A1C of 12.4.  Had ACDF 3d ago. No issues with anesthetic.    Past Medical History:   Diagnosis Date    Abnormal electrocardiogram (ECG) (EKG) 11/8/2021    KRISTAL (acute kidney injury) ([facility]) 11/8/2021    Chest pain in adult 11/8/2021    CKD (chronic kidney disease) stage 3, GFR 30-59 ml/min 11/7/2021    Diabetes ([facility])     Diabetic ketoacidosis without coma associated with type 2 diabetes mellitus ([facility]) 11/7/2021    Diarrhea 3/18/2022    DKA, type 1, not at goal ([facility]) 7/28/2022    Esophagitis 7/28/2022    [place] (hard of hearing)     Very [place] No hearing aides    Hypercholesteremia        Past Surgical History:   Procedure Laterality Date    CERVICAL DISK AND FUSION ANTERIOR N/A 6/4/2024    Procedure: C4-C7 ANTERIOR CERVICAL DISC AND FUSION;  Surgeon: Moo Pulido III, M.D.;  Location: [facility];  Service: Neurosurgery    FINGER OR HAND INCISION AND DRAINAGE Right 6/12/2023    Procedure: INCISION AND DRAINAGE, HAND - THUMB;  Surgeon: Ace Shaw M.D.;  Location: [facility];  Service: Orthopedics    DC UPPER GI ENDOSCOPY,DIAGNOSIS N/A 3/14/2023    Procedure: GASTROSCOPY;  Surgeon: Atilio Freed M.D.;  Location: [facility];  Service: Gastroenterology    OTHER      appy, lumbar fusion       Current Outpatient Medications   Medication Instructions    aspirin 81 mg, Oral, EVERY EVENING    Lantus SoloStar 20 Units, Subcutaneous, 2 TIMES DAILY    metoclopramide (REGLAN) 10 mg, Oral, 3 TIMES DAILY PRN    multivitamin Tab 1 Tablet, Oral, EVERY EVENING     oxyCODONE immediate-release (ROXICODONE) 5 mg, Oral, EVERY 6 HOURS PRN, 3 day supply     pantoprazole (PROTONIX) 40 mg, Oral, 2 TIMES DAILY       /67   Pulse 99   Temp 36.9 °C (98.4 °F) (Temporal)   Resp 18   Ht 1.829 m (6')   Wt 73.5 kg (162 lb 0.6 oz)   SpO2 94%     No Known Allergies    Lab Results   Component Value Date/Time    SODIUM 142 06/04/2024 12:53 AM    POTASSIUM 4.2 06/04/2024 12:53 AM    CHLORIDE 104 06/04/2024 12:53 AM    GLUCOSE 152 (H) 06/04/2024 12:53 AM    BUN 27 (H) 06/04/2024 12:53 AM    CREATININE 0.74 06/04/2024 12:53 AM   A1C 12.4   today    Lab Results   Component Value Date/Time    WBC 5.6 06/04/2024 12:53 AM    RBC 3.90 (L) 06/04/2024 12:53 AM    HEMOGLOBIN 11.0 (L) 06/04/2024 12:53 AM    HEMATOCRIT 34.5 (L) 06/04/2024 12:53 AM    PLATELETCT 231 06/04/2024 12:53 AM          Relevant Problems   PULMONARY   (positive) Pneumonia due to infectious organism         (positive) Chronic renal insufficiency      ENDO   (positive) Diabetes mellitus type 2, insulin dependent, poorly controlled with marked hyperglycemia (HCC)      Other   (positive) History of osteomyelitis of right hand (HCC)       Physical Exam    Airway   Mallampati: II  TM distance: >3 FB  Neck ROM: full       Cardiovascular - normal exam  Rhythm: regular  Rate: normal  (-) murmur     Dental - normal exam           Pulmonary - normal exam  Breath sounds clear to auscultation     Abdominal    Neurological - normal exam         Other findings: C-collar in place              Anesthesia Plan    ASA 3- EMERGENT   ASA physical status 3 criteria: diabetes - poorly controlledASA physical status emergent criteria: neurologic compromise requiring immediate intervention    Plan - general       Airway plan will be ETT    (Plan for GlideScope intubation)      Induction: intravenous    Postoperative Plan: Postoperative administration of opioids is intended.    Pertinent diagnostic labs and testing reviewed    Informed  Consent:    Anesthetic plan and risks discussed with patient.    Use of blood products discussed with: patient whom consented to blood products.       Anesthetic procedure and risks discussed with patient in detail.  Risks include but are not limited to PONV, pain, sore throat, damage to teeth/lips/gums, aspiration, positioning injury, allergic reaction, vocal cord injury, prolonged intubation, stroke, and/or cardiopulmonary problems up to and including death.  Patient indicates complete understanding. All questions fully answered and they agree to proceed as planned above.

## 2024-06-07 NOTE — PROGRESS NOTES
[facility] Medicine Daily Progress Note    Date of Service  6/7/2024    Chief Complaint  Christoph Taylor is a 59 y.o. male admitted 5/27/2024 with GLF    Hospital Course    59-year-old male with history of diabetes and esophagitis presented with fall, patient was found to have pneumonia, due to fall and back pain, MRI was done and showed severe spinal stenosis in his cervical and lumbar spine, patient underwent C4-C7  discectomy on 6/4 and surgery for his lumbar stenosis on 6/7.    Interval Problem Update  -Evaluated examined the patient at bedside, denied any new symptoms, no significant weakness on arms or legs, planning for surgery today.  -Evaluate him with labs tomorrow  -PMR consult    I have discussed this patient's plan of care and discharge plan at IDT rounds today with Case Management, Nursing, Nursing leadership, and other members of the IDT team.    Consultants/Specialty  neurosurgery    Code Status  Full Code    Disposition  The patient is not medically cleared for discharge to home or a post-acute facility.  Anticipate discharge to: skilled nursing facility    I have placed the appropriate orders for post-discharge needs.    Review of Systems  Review of Systems   Unable to perform ROS: Other   Constitutional:  Negative for chills and fever.   Respiratory:  Negative for cough and shortness of breath.    Cardiovascular:  Negative for chest pain, palpitations and leg swelling.   Gastrointestinal:  Negative for abdominal pain, diarrhea, nausea and vomiting.   Genitourinary:  Negative for dysuria and urgency.   Musculoskeletal:  Positive for back pain and neck pain.   Skin:  Negative for rash.   Neurological:  Positive for sensory change. Negative for dizziness, loss of consciousness and headaches.        Physical Exam  Temp:  [36.1 °C (97 °F)-37.4 °C (99.4 °F)] 37.4 °C (99.4 °F)  Pulse:  [] 100  Resp:  [16-18] 16  BP: ()/(53-86) 124/72  SpO2:  [92 %-96 %] 95 %    Physical  Exam  Constitutional:       General: He is not in acute distress.     Appearance: He is well-developed. He is not diaphoretic.   HENT:      Head: Normocephalic and atraumatic.   Eyes:      Conjunctiva/sclera: Conjunctivae normal.   Neck:      Vascular: No JVD.      Comments: In hard collar  Post op dressing in place  Cardiovascular:      Rate and Rhythm: Normal rate.      Heart sounds: No murmur heard.     No gallop.   Pulmonary:      Effort: Pulmonary effort is normal. No respiratory distress.      Breath sounds: No stridor. No wheezing or rales.   Abdominal:      Palpations: Abdomen is soft.      Tenderness: There is no abdominal tenderness. There is no guarding or rebound.   Skin:     General: Skin is warm and dry.      Findings: No rash.   Neurological:      Mental Status: He is oriented to person, place, and time.      Comments: Antigravity strength x 4.  Sensation intact   Psychiatric:         Mood and Affect: Mood normal.         Behavior: Behavior normal.         Thought Content: Thought content normal.         Fluids    Intake/Output Summary (Last 24 hours) at 6/7/2024 1429  Last data filed at 6/7/2024 1100  Gross per 24 hour   Intake 120 ml   Output 300 ml   Net -180 ml       Laboratory                            Imaging  DX-CERVICAL SPINE-2 OR 3 VIEWS   Final Result      Digitized intraoperative radiograph is submitted for review. This examination is not for diagnostic purpose but for guidance during a surgical procedure. Please see the patient's chart for full procedural details.         INTERPRETING LOCATION: [facility], [address]      DX-PORTABLE FLUORO > 1 HOUR   Final Result      Portable fluoroscopy utilized for 8 seconds.         INTERPRETING LOCATION: [address] [facility], [address]      MR-CERVICAL SPINE-W/O   Final Result      1.  Multilevel multifactorial degenerative changes   2.  High-grade central canal narrowing at C4-C5, C5-C6 and C6-C7 with findings suspicious for myelomalacia at  C4-C6   3.  Other areas of central canal and neural foraminal narrowing as described above      DX-LUMBAR SPINE-BEND OR FLEX-EXT   Final Result      L1-L4 degenerative retrolisthesis without definite significant hypermobility.      DX-LUMBAR SPINE-2 OR 3 VIEWS   Final Result      1.  Multilevel degenerative changes of the lumbar spine.   2.  Mild grade 1 retrolisthesis visible with extension maneuver at L2-3 and L3-4 which could indicate hypermobility.      MR-THORACIC SPINE-WITH & W/O   Final Result         Remote endplate compression fractures at T1, T2, T12 without significant posterior cortical retropulsion or canal stenosis.      Mild degenerative changes of the thoracic spine without significant canal stenosis or foraminal narrowing.         MR-LUMBAR SPINE-WITH & W/O   Final Result         Severe canal stenosis at L4-5 with cauda equina compression. There is also severe right foraminal narrowing.      Postoperative changes at L5-S1 with a well decompressed canal.      Remote compression fracture involving the superior endplate of T12 with 50% loss of height.      DX-FOOT-2- LEFT   Final Result      No acute osseous abnormality.      Prominent degenerative changes within the mid and hindfoot with pes planus type deformity.      CT-LSPINE W/O PLUS RECONS   Final Result         1.  No acute traumatic bony injury of the lumbar spine.      CT-TSPINE W/O PLUS RECONS   Final Result         1.  No acute traumatic bony injury of the thoracic spine.      CT-CHEST,ABDOMEN,PELVIS WITH   Final Result      DX-PORTABLE FLUORO > 1 HOUR    (Results Pending)   DX-LUMBAR SPINE-2 OR 3 VIEWS    (Results Pending)   DX-O-ARM    (Results Pending)        Assessment/Plan  * Cervical stenosis of spinal canal  Assessment & Plan  S/p ant fusion 6/4  Follow neuro exam  Neuro surgery following  Supportive care  PT and OT and PMR consult    Chronic renal insufficiency  Assessment & Plan  Creat baseline  Follow daily BMP  Renally dose  medications as appropriate    Cauda equina compression (HCC)  Assessment & Plan  Srgry following  L spine decompression on 6/7  PT/OT  Physiatry consult    Fall  Assessment & Plan  Patient did not lose consciousness or hit his head  Complains of neck pain, back pain and hip pain  PT OT eval    Pneumonia due to infectious organism- (present on admission)  Assessment & Plan  Bilateral infiltrates on CT scan  Follow-up blood cultures and sputum cultures neg  ceftriaxone and doxycycline completed 6/2  Resolved    Acute respiratory failure with hypoxia (HCC)- (present on admission)  Assessment & Plan  Now on RA/1 LNC.  Desaturates while asleep to upper 70s: suspect undiagnosed sleep apnea.  Qhs O2, outpt referal for sleep study  IS  O2/RT protocols  Mobilize as able  Weaning down to baseline    Thoracic back pain- (present on admission)  Assessment & Plan  Critical spinal stenosis of both C and L spines  Cont supportive care while definitive surgeries are completed    Diabetes mellitus type 2, insulin dependent, poorly controlled with marked hyperglycemia (HCC)- (present on admission)  Assessment & Plan  insulin sliding scale with serial Accu-Checks  A1c 12.4  Hypoglycemic protocol in place  BG has been easily controled to low 100s in house on current regimen      Acute esophagitis- (present on admission)  Assessment & Plan  Continue Protonix         VTE prophylaxis:   SCDs/TEDs   pharmacologic prophylaxis contraindicated due to Neurosurgery      I have performed a physical exam and reviewed and updated ROS and Plan today (6/7/2024). In review of yesterday's note (6/6/2024), there are no changes except as documented above.

## 2024-06-07 NOTE — OR SURGEON
Immediate Post OP Note    PreOp Diagnosis: lumbar stenosis, lumbar spondylosis      PostOp Diagnosis: same      Procedure(s):  FUSION, SPINE, LUMBAR, PLIF- REDO LUMBAR 4-5 LAMINECTOMY WITH L4-5 STEALTH FUSION - Wound Class: Clean      Surgeon(s):  Moo Pulido III, M.D.    Anesthesiologist/Type of Anesthesia:  Anesthesiologist: Zhang Arias M.D./General    Surgical Staff:  Assistant: Mee Le P.A.-C.  Circulator: Dave Darling R.N.  Scrub Person: Milton Templeton  X-Ray Technologist: Keanu Zamora [place]: Meme Gordillo R.N.  Radiology Technologist: Altagracia Maldonado    Specimens removed if any:  * No specimens in log *    Estimated Blood Loss: 200 cc    Findings: patient did well, see OP report.     Complications: small durotomy, repaired with muscle plug and blue glue.         6/7/2024 4:37 PM Mee Le P.A.-C.

## 2024-06-07 NOTE — ANESTHESIA PROCEDURE NOTES
Airway    Date/Time: 6/7/2024 2:05 PM    Performed by: Zhang Arias M.D.  Authorized by: Zhang Arias M.D.    Location:  OR  Urgency:  Elective  Indications for Airway Management:  Anesthesia      Spontaneous Ventilation: absent    Sedation Level:  Deep  Preoxygenated: Yes    Patient Position:  Sniffing  Mask Difficulty Assessment:  0 - not attempted  Final Airway Type:  Endotracheal airway  Final Endotracheal Airway:  ETT  Cuffed: Yes    Technique Used for Successful ETT Placement:  Direct laryngoscopy  Devices/Methods Used in Placement:  Intubating stylet    Insertion Site:  Oral  Blade Type:  Glide  Laryngoscope Blade/Videolaryngoscope Blade Size:  4  ETT Size (mm):  7.5  Measured from:  Teeth  ETT to Teeth (cm):  23  Placement Verified by: auscultation and capnometry    Cormack-Lehane Classification:  Grade I - full view of glottis  Number of Attempts at Approach:  1

## 2024-06-07 NOTE — CARE PLAN
The patient is Stable - Low risk of patient condition declining or worsening    Shift Goals  Clinical Goals: pt will maintain O2 >90% throughout shift, monitor for SOB  Patient Goals: Comfort, rest  Family Goals: KATHERINE    Progress made toward(s) clinical / shift goals:  Pt alert and oriented, able to make needs known. Pain medication controlled with medication given as ordered. Other medications administered per MAR. NPO at midnight for procedure today. Other needs met at this time. Bed locked in lowest position, call light and personal belongings in reach.    Patient is not progressing towards the following goals:

## 2024-06-08 LAB
ALBUMIN SERPL BCP-MCNC: 3.1 G/DL (ref 3.2–4.9)
ALBUMIN/GLOB SERPL: 1.1 G/DL
ALP SERPL-CCNC: 81 U/L (ref 30–99)
ALT SERPL-CCNC: 9 U/L (ref 2–50)
ANION GAP SERPL CALC-SCNC: 8 MMOL/L (ref 7–16)
AST SERPL-CCNC: 11 U/L (ref 12–45)
BASOPHILS # BLD AUTO: 0.1 % (ref 0–1.8)
BASOPHILS # BLD: 0.01 K/UL (ref 0–0.12)
BILIRUB SERPL-MCNC: 0.4 MG/DL (ref 0.1–1.5)
BUN SERPL-MCNC: 30 MG/DL (ref 8–22)
CALCIUM ALBUM COR SERPL-MCNC: 9 MG/DL (ref 8.5–10.5)
CALCIUM SERPL-MCNC: 8.3 MG/DL (ref 8.5–10.5)
CHLORIDE SERPL-SCNC: 98 MMOL/L (ref 96–112)
CO2 SERPL-SCNC: 31 MMOL/L (ref 20–33)
CREAT SERPL-MCNC: 0.77 MG/DL (ref 0.5–1.4)
EOSINOPHIL # BLD AUTO: 0 K/UL (ref 0–0.51)
EOSINOPHIL NFR BLD: 0 % (ref 0–6.9)
ERYTHROCYTE [DISTWIDTH] IN BLOOD BY AUTOMATED COUNT: 44 FL (ref 35.9–50)
GFR SERPLBLD CREATININE-BSD FMLA CKD-EPI: 102 ML/MIN/1.73 M 2
GLOBULIN SER CALC-MCNC: 2.8 G/DL (ref 1.9–3.5)
GLUCOSE BLD STRIP.AUTO-MCNC: 123 MG/DL (ref 65–99)
GLUCOSE BLD STRIP.AUTO-MCNC: 139 MG/DL (ref 65–99)
GLUCOSE BLD STRIP.AUTO-MCNC: 160 MG/DL (ref 65–99)
GLUCOSE BLD STRIP.AUTO-MCNC: 229 MG/DL (ref 65–99)
GLUCOSE BLD STRIP.AUTO-MCNC: 244 MG/DL (ref 65–99)
GLUCOSE SERPL-MCNC: 253 MG/DL (ref 65–99)
HCT VFR BLD AUTO: 32.3 % (ref 42–52)
HGB BLD-MCNC: 10 G/DL (ref 14–18)
IMM GRANULOCYTES # BLD AUTO: 0.07 K/UL (ref 0–0.11)
IMM GRANULOCYTES NFR BLD AUTO: 0.6 % (ref 0–0.9)
LYMPHOCYTES # BLD AUTO: 0.34 K/UL (ref 1–4.8)
LYMPHOCYTES NFR BLD: 3 % (ref 22–41)
MCH RBC QN AUTO: 27.6 PG (ref 27–33)
MCHC RBC AUTO-ENTMCNC: 31 G/DL (ref 32.3–36.5)
MCV RBC AUTO: 89.2 FL (ref 81.4–97.8)
MONOCYTES # BLD AUTO: 0.62 K/UL (ref 0–0.85)
MONOCYTES NFR BLD AUTO: 5.4 % (ref 0–13.4)
NEUTROPHILS # BLD AUTO: 10.48 K/UL (ref 1.82–7.42)
NEUTROPHILS NFR BLD: 90.9 % (ref 44–72)
NRBC # BLD AUTO: 0 K/UL
NRBC BLD-RTO: 0 /100 WBC (ref 0–0.2)
PLATELET # BLD AUTO: 187 K/UL (ref 164–446)
PMV BLD AUTO: 8.9 FL (ref 9–12.9)
POTASSIUM SERPL-SCNC: 4.2 MMOL/L (ref 3.6–5.5)
PROT SERPL-MCNC: 5.9 G/DL (ref 6–8.2)
RBC # BLD AUTO: 3.62 M/UL (ref 4.7–6.1)
SODIUM SERPL-SCNC: 137 MMOL/L (ref 135–145)
WBC # BLD AUTO: 11.5 K/UL (ref 4.8–10.8)

## 2024-06-08 PROCEDURE — 770001 HCHG ROOM/CARE - MED/SURG/GYN PRIV*

## 2024-06-08 PROCEDURE — 85025 COMPLETE CBC W/AUTO DIFF WBC: CPT

## 2024-06-08 PROCEDURE — 99232 SBSQ HOSP IP/OBS MODERATE 35: CPT | Performed by: STUDENT IN AN ORGANIZED HEALTH CARE EDUCATION/TRAINING PROGRAM

## 2024-06-08 PROCEDURE — 80053 COMPREHEN METABOLIC PANEL: CPT

## 2024-06-08 PROCEDURE — A9270 NON-COVERED ITEM OR SERVICE: HCPCS | Performed by: INTERNAL MEDICINE

## 2024-06-08 PROCEDURE — 700105 HCHG RX REV CODE 258: Performed by: STUDENT IN AN ORGANIZED HEALTH CARE EDUCATION/TRAINING PROGRAM

## 2024-06-08 PROCEDURE — 700102 HCHG RX REV CODE 250 W/ 637 OVERRIDE(OP): Performed by: INTERNAL MEDICINE

## 2024-06-08 PROCEDURE — 700102 HCHG RX REV CODE 250 W/ 637 OVERRIDE(OP): Performed by: HOSPITALIST

## 2024-06-08 PROCEDURE — A9270 NON-COVERED ITEM OR SERVICE: HCPCS | Performed by: HOSPITALIST

## 2024-06-08 PROCEDURE — A9270 NON-COVERED ITEM OR SERVICE: HCPCS

## 2024-06-08 PROCEDURE — 700102 HCHG RX REV CODE 250 W/ 637 OVERRIDE(OP)

## 2024-06-08 PROCEDURE — 700111 HCHG RX REV CODE 636 W/ 250 OVERRIDE (IP)

## 2024-06-08 PROCEDURE — 700111 HCHG RX REV CODE 636 W/ 250 OVERRIDE (IP): Performed by: STUDENT IN AN ORGANIZED HEALTH CARE EDUCATION/TRAINING PROGRAM

## 2024-06-08 PROCEDURE — 700101 HCHG RX REV CODE 250

## 2024-06-08 PROCEDURE — 36415 COLL VENOUS BLD VENIPUNCTURE: CPT

## 2024-06-08 PROCEDURE — A9270 NON-COVERED ITEM OR SERVICE: HCPCS | Performed by: STUDENT IN AN ORGANIZED HEALTH CARE EDUCATION/TRAINING PROGRAM

## 2024-06-08 PROCEDURE — 700102 HCHG RX REV CODE 250 W/ 637 OVERRIDE(OP): Performed by: STUDENT IN AN ORGANIZED HEALTH CARE EDUCATION/TRAINING PROGRAM

## 2024-06-08 PROCEDURE — 700111 HCHG RX REV CODE 636 W/ 250 OVERRIDE (IP): Mod: JZ

## 2024-06-08 PROCEDURE — 97530 THERAPEUTIC ACTIVITIES: CPT

## 2024-06-08 PROCEDURE — 82962 GLUCOSE BLOOD TEST: CPT | Mod: 91

## 2024-06-08 RX ORDER — METHOCARBAMOL 750 MG/1
750 TABLET, FILM COATED ORAL EVERY 8 HOURS PRN
Status: DISCONTINUED | OUTPATIENT
Start: 2024-06-08 | End: 2024-06-08

## 2024-06-08 RX ORDER — METHOCARBAMOL 750 MG/1
750 TABLET, FILM COATED ORAL EVERY 8 HOURS
Status: DISCONTINUED | OUTPATIENT
Start: 2024-06-08 | End: 2024-06-08

## 2024-06-08 RX ORDER — METHOCARBAMOL 500 MG/1
500 TABLET, FILM COATED ORAL EVERY 8 HOURS PRN
Status: DISCONTINUED | OUTPATIENT
Start: 2024-06-08 | End: 2024-06-11 | Stop reason: HOSPADM

## 2024-06-08 RX ADMIN — CEFAZOLIN 2 G: 2 INJECTION, POWDER, FOR SOLUTION INTRAMUSCULAR; INTRAVENOUS at 00:22

## 2024-06-08 RX ADMIN — DOCUSATE SODIUM 100 MG: 100 CAPSULE, LIQUID FILLED ORAL at 17:58

## 2024-06-08 RX ADMIN — OXYCODONE HYDROCHLORIDE 10 MG: 10 TABLET ORAL at 01:48

## 2024-06-08 RX ADMIN — Medication 2000 UNITS: at 06:12

## 2024-06-08 RX ADMIN — METHOCARBAMOL 1000 MG: 100 INJECTION, SOLUTION INTRAMUSCULAR; INTRAVENOUS at 14:51

## 2024-06-08 RX ADMIN — OXYCODONE HYDROCHLORIDE 10 MG: 10 TABLET ORAL at 19:55

## 2024-06-08 RX ADMIN — HALOPERIDOL LACTATE 2.5 MG: 5 INJECTION, SOLUTION INTRAMUSCULAR at 00:11

## 2024-06-08 RX ADMIN — METHOCARBAMOL 750 MG: 750 TABLET ORAL at 09:20

## 2024-06-08 RX ADMIN — CEFAZOLIN 2 G: 2 INJECTION, POWDER, FOR SOLUTION INTRAMUSCULAR; INTRAVENOUS at 13:39

## 2024-06-08 RX ADMIN — METHOCARBAMOL 1000 MG: 100 INJECTION, SOLUTION INTRAMUSCULAR; INTRAVENOUS at 20:45

## 2024-06-08 RX ADMIN — OXYCODONE HYDROCHLORIDE 10 MG: 10 TABLET ORAL at 09:20

## 2024-06-08 RX ADMIN — INSULIN GLARGINE-YFGN 15 UNITS: 100 INJECTION, SOLUTION SUBCUTANEOUS at 06:06

## 2024-06-08 RX ADMIN — DOCUSATE SODIUM 100 MG: 100 CAPSULE, LIQUID FILLED ORAL at 06:13

## 2024-06-08 RX ADMIN — SODIUM CHLORIDE, POTASSIUM CHLORIDE, SODIUM LACTATE AND CALCIUM CHLORIDE: 600; 310; 30; 20 INJECTION, SOLUTION INTRAVENOUS at 00:18

## 2024-06-08 RX ADMIN — OMEPRAZOLE 20 MG: 20 CAPSULE, DELAYED RELEASE ORAL at 06:00

## 2024-06-08 RX ADMIN — OMEPRAZOLE 20 MG: 20 CAPSULE, DELAYED RELEASE ORAL at 17:58

## 2024-06-08 RX ADMIN — VENLAFAXINE HYDROCHLORIDE 75 MG: 75 CAPSULE, EXTENDED RELEASE ORAL at 08:12

## 2024-06-08 RX ADMIN — METHOCARBAMOL 1000 MG: 100 INJECTION, SOLUTION INTRAMUSCULAR; INTRAVENOUS at 01:38

## 2024-06-08 RX ADMIN — LIDOCAINE 2 PATCH: 4 PATCH TOPICAL at 06:01

## 2024-06-08 RX ADMIN — Medication 1 APPLICATOR: at 17:57

## 2024-06-08 RX ADMIN — SENNOSIDES AND DOCUSATE SODIUM 1 TABLET: 50; 8.6 TABLET ORAL at 19:54

## 2024-06-08 RX ADMIN — INSULIN GLARGINE-YFGN 15 UNITS: 100 INJECTION, SOLUTION SUBCUTANEOUS at 20:00

## 2024-06-08 RX ADMIN — CEFAZOLIN 2 G: 2 INJECTION, POWDER, FOR SOLUTION INTRAMUSCULAR; INTRAVENOUS at 05:50

## 2024-06-08 RX ADMIN — INSULIN HUMAN 2 UNITS: 100 INJECTION, SOLUTION PARENTERAL at 13:27

## 2024-06-08 RX ADMIN — INSULIN HUMAN 3 UNITS: 100 INJECTION, SOLUTION PARENTERAL at 08:10

## 2024-06-08 RX ADMIN — OXYCODONE HYDROCHLORIDE 10 MG: 10 TABLET ORAL at 06:16

## 2024-06-08 RX ADMIN — CYANOCOBALAMIN TAB 500 MCG 1000 MCG: 500 TAB at 06:12

## 2024-06-08 RX ADMIN — HYDROMORPHONE HYDROCHLORIDE 0.5 MG: 1 INJECTION, SOLUTION INTRAMUSCULAR; INTRAVENOUS; SUBCUTANEOUS at 03:02

## 2024-06-08 ASSESSMENT — PAIN DESCRIPTION - PAIN TYPE
TYPE: ACUTE PAIN

## 2024-06-08 ASSESSMENT — GAIT ASSESSMENTS: GAIT LEVEL OF ASSIST: UNABLE TO PARTICIPATE

## 2024-06-08 ASSESSMENT — COGNITIVE AND FUNCTIONAL STATUS - GENERAL
WALKING IN HOSPITAL ROOM: TOTAL
TURNING FROM BACK TO SIDE WHILE IN FLAT BAD: A LOT
MOVING TO AND FROM BED TO CHAIR: A LOT
SUGGESTED CMS G CODE MODIFIER MOBILITY: CL
MOVING FROM LYING ON BACK TO SITTING ON SIDE OF FLAT BED: A LOT
STANDING UP FROM CHAIR USING ARMS: A LOT
MOBILITY SCORE: 10
CLIMB 3 TO 5 STEPS WITH RAILING: TOTAL

## 2024-06-08 NOTE — ANESTHESIA POSTPROCEDURE EVALUATION
Patient: Christoph Taylor    Procedure Summary       Date: 06/07/24 Room / Location: [facility] OR 04 / SURGERY [facility]    Anesthesia Start: 1355 Anesthesia Stop: 1651    Procedures:       FUSION, SPINE, LUMBAR, PLIF- REDO LUMBAR 4-5 LAMINECTOMY WITH L4-5 STEALTH FUSION (Spine Lumbar)      LAMINECTOMY, SPINE, LUMBAR, WITH DISCECTOMY (Spine Lumbar) Diagnosis: (LUMBAR SPONDYLOSIS AND STENOSIS)    Surgeons: Moo Pulido III, M.D. Responsible Provider: Zhang Arias M.D.    Anesthesia Type: general ASA Status: 3 - Emergent            Final Anesthesia Type: general  Last vitals  BP   Blood Pressure: 128/73    Temp   36.4 °C (97.5 °F)    Pulse   92   Resp   13    SpO2   96 %      Anesthesia Post Evaluation    Patient location during evaluation: PACU  Patient participation: complete - patient participated  Level of consciousness: sleepy but conscious  Pain score: 0    Airway patency: patent  Anesthetic complications: no  Cardiovascular status: hemodynamically stable  Respiratory status: acceptable  Hydration status: balanced    PONV: none          There were no known notable events for this encounter.     Nurse Pain Score: 0 (NPRS)

## 2024-06-08 NOTE — OR NURSING
2102 report to Mason RN surgery  by Dr Pulido.   Dressing to back CDI. 140 cc EMIL output. Pt rec'd local, dilaudid and ketamine in surgery. Had been AXOX4.then approximately 2000 became agitated/ confused. Right/ left wrist soft Restraints placed. Rec'd Haldol 2.5 mg iv given.  Pt presently subdued.VSS.  . Lab for BMP collected.   Kae PATTERSON was updated on circumstances. Instruct to call her for any needs/concerns. All questions answered.

## 2024-06-08 NOTE — DISCHARGE PLANNING
Case Management Discharge Planning    Admission Date: 5/27/2024  GMLOS: 7.1  ALOS: 12    6-Clicks ADL Score: 15  6-Clicks Mobility Score: 18  PT and/or OT Eval ordered: Yes  Post-acute Referrals Ordered: Yes  Post-acute Choice Obtained: Yes  Has referral(s) been sent to post-acute provider:  Yes      Anticipated Discharge Dispo: Discharge Disposition: D/T to SNF with Medicare cert in anticipation of skilled care (03)    DME Needed: No    Action(s) Taken: Discussed in IDT rounds, pt is pending PT/OT evaluation and neurosurgery clearance. Pt has had increased drain output in the last 24 hours. Complex Case Management team to follow. Pt is pending SNF acceptance at this time.     Escalations Completed: None    Medically Clear: No    Next Steps: Medical clearance and placement.     Barriers to Discharge: Medical clearance and Pending Placement    Is the patient up for discharge tomorrow: No

## 2024-06-08 NOTE — OR NURSING
1738 pt jack. Contacted Dr Pulido. Pt AUGUST.   Follows commands to , plantarflex,  dorsiflex, bend knees.  Dr Pulido states back brace not required. Flat 24 hours.

## 2024-06-08 NOTE — CARE PLAN
The patient is Watcher - Medium risk of patient condition declining or worsening    Shift Goals  Clinical Goals: safety, pain control  Patient Goals: rest, comfort  Family Goals: no family present    Progress made toward(s) clinical / shift goals:    Problem: Pain - Standard  Goal: Alleviation of pain or a reduction in pain to the patient’s comfort goal  Outcome: Progressing    Administer pain medications per MAR.     Problem: Fall Risk  Goal: Patient will remain free from falls  Outcome: Progressing    Kept side rails up and bed to  its lowest position. Kept call bell within reach. Bed alarm on.        Patient is not progressing towards the following goals:

## 2024-06-08 NOTE — OR NURSING
1800 intermittent episodes of restlessness/ trying to get OOB. Responds to requests to stay in bed. Denies pain. Denies nausea.

## 2024-06-08 NOTE — OR NURSING
1930 report to Jennifer CASPER. Reviewed pertinent information. All questions answered.    1945 contacted Kae PATTERSON. Request pt transfer to T3. Kae PATTERSON will place orders.     2000 pt suddenly becomes agitated/ confused. Pt  felt he was being held captive in someone's apartment. Kicking/ yelling. Security called and at bedside.     2024 Dr Hernandez anesthesia at bedside. Orders haldol 2.5 mg IV stat.     2028 Kae PATTERSON updated. Pt became agitated... kicking, pulling on lines, trying to get  out of bed. Gave haldol 2.5 mg per Dr Hernandez. Right/ left soft wrist restraints placed.  Pt presently subdued. Dr Hernandez orders BMP.     2035 Dr Hernandez stops by. Pt VSS.  . Pt subdued from haldol.    2049 lab drawn

## 2024-06-08 NOTE — OR NURSING
Transported pt to room. Mason CASPER at bedside. Pt is calm and cooperative and oriented x 4. Restraints released. Assisted Mason CASPER in repositioning patient. No other concerns.

## 2024-06-08 NOTE — CARE PLAN
"The patient is Stable - Low risk of patient condition declining or worsening    Shift Goals  Clinical Goals: safety  Patient Goals: comfort  Family Goals: no family present    Progress made toward(s) clinical / shift goals:  pt not oriented. Not redirectable. When he's in pain, he yells \"I'm in pain\" repeatedly despite having been given pain medication and education. Bed alarm on. Bilateral wrist restraints remain in place for safety of pulling at lines/ drains. Skin remains intact. Q2 turns as pt allows.     Patient is not progressing towards the following goals:      Problem: Pain - Standard  Goal: Alleviation of pain or a reduction in pain to the patient’s comfort goal  Outcome: Not Progressing     Problem: Knowledge Deficit - Standard  Goal: Patient and family/care givers will demonstrate understanding of plan of care, disease process/condition, diagnostic tests and medications  Outcome: Not Progressing     Problem: Provide Safe Environment  Goal: Suicide environmental safety, protocols, policies, and practices will be implemented  Outcome: Not Progressing     Problem: Psychosocial  Goal: Patient's ability to identify and develop effective coping behaviors will improve  Outcome: Not Progressing  Goal: Patient's ability to identify and utilize available support systems will improve  Outcome: Not Progressing     Problem: Fall Risk  Goal: Patient will remain free from falls  Outcome: Not Progressing     Problem: Depression  Goal: Patient and family/caregiver will verbalize accurate information about at least two of the possible causes of depression, three-four of the signs and symptoms of depression  Outcome: Not Progressing     Problem: Skin Integrity  Goal: Skin integrity is maintained or improved  Outcome: Not Progressing     " Detail Level: Simple Additional Notes: Patient consent was obtained to proceed with the visit and recommended plan of care after discussion of all risks and benefits, including the risks of COVID-19 exposure.

## 2024-06-08 NOTE — PROGRESS NOTES
Neurosurgery Progress Note    Subjective:  59 yo man presented with a fall about one week ago with resultant increased right foot weakness. MRI shows critical L4-5 spondylosis and stenosis. Has a history of a prior L5-S1 onlay fusion 30 years ago.     Reporting back pain and right greater than left lower extremity radiculopathy.   Right foot weakness requiring AFO brace. Has bene having loss of bowels for several months.    Lumbar x-rays 6/ show multilevel spondylosis with grade 1 retrolisthesis of L2 on L3 and L3 on L4. No dynamic instability.  Cervical MRI 6/1 shows multilevel severe central canal stenosis C4-7 with possible cord signal change C4-6.    POD#4 C4-7 ACDF.  Doing fine, denies significant UE pain, has some neck pain as expected.  Swallowing ok, but with some discomfort.    POD#1 redo L4/5 laminectomy with PSF.  Reporting back pain.   Persistent bilateral buttock pain.   Had durotomy intra-op, has been flat overnight. Reporting constant headache since surgery.   Was combative overnight per nursing, now in soft restraints.        Exam:  Bilateral interossei wasting, motor 3+/5 bilateral triceps, 4-/5 bilateral interossei.  Motor right EHL/AT 4/5, limited due to flat foot on the left with 4/5, othwerise 5/5 in bilateral lower extremities.   Sensation intact except distally in the lower extremities  Anterior cervical dressing c/d/I.  Aspen cervical collar in place.  Voice fine.   Lumbar incision CDI with no active drainage.     BP  Min: 91/56  Max: 136/86  Pulse  Av.9  Min: 89  Max: 108  Resp  Avg: 15.3  Min: 9  Max: 26  Temp  Av.9 °C (98.5 °F)  Min: 36.4 °C (97.5 °F)  Max: 37.9 °C (100.2 °F)  SpO2  Av.7 %  Min: 91 %  Max: 97 %    No data recorded    Recent Labs     24  0834   WBC 11.5*   RBC 3.62*   HEMOGLOBIN 10.0*   HEMATOCRIT 32.3*   MCV 89.2   MCH 27.6   MCHC 31.0*   RDW 44.0   PLATELETCT 187   MPV 8.9*       Recent Labs     24  2049   SODIUM 139   POTASSIUM 4.3   CHLORIDE  97   CO2 31   GLUCOSE 191*   BUN 36*   CREATININE 0.87   CALCIUM 8.5                   Intake/Output                         06/07/24 0700 - 06/08/24 0659 06/08/24 0700 - 06/09/24 0659     0700-1859 1900-0659 Total 0700-1859 1900-0659 Total                 Intake    I.V.  1500  250 1750  --  -- --    Volume (mL) (Lactated Ringers) 750 -- 750 -- -- --    Volume (mL) (lactated ringers infusion)  -- -- --    Total Intake 6283 114 3360 -- -- --       Output    Urine  300  250 550  200  -- 200    Number of Times Voided 2 x -- 2 x 1 x -- 1 x    Urine Void (mL) 300 250 550 200 -- 200    Drains  30  225 255  100  -- 100    Output (mL) (Closed/Suction Drain 1 Midline Back Alexandru Michelle) 30 225 255 100 -- 100    Blood  200  -- 200  --  -- --    Est. Blood Loss 200 -- 200 -- -- --    Total Output  300 -- 300       Net I/O     970 -225 745 -300 -- -300              Intake/Output Summary (Last 24 hours) at 6/8/2024 0913  Last data filed at 6/8/2024 0800  Gross per 24 hour   Intake 1750 ml   Output 1305 ml   Net 445 ml             vitamin D3  2,000 Units DAILY    LR   Continuous    ceFAZolin  2 g Q8HRS    methocarbamol  750 mg Q8HRS PRN    insulin GLARGINE  15 Units BID    dextrose  250 mL Q15 MIN PRN    Pharmacy Consult Request  1 Each PHARMACY TO DOSE    MD ALERT...DO NOT ADMINISTER NSAIDS or ASPIRIN unless ORDERED By Neurosurgery  1 Each PRN    docusate sodium  100 mg BID    senna-docusate  1 Tablet Nightly    senna-docusate  1 Tablet Q24HRS PRN    polyethylene glycol/lytes  1 Packet BID PRN    magnesium hydroxide  30 mL QDAY PRN    bisacodyl  10 mg Q24HRS PRN    sodium phosphate  1 Each Once PRN    [Held by provider] enoxaparin (LOVENOX) injection  40 mg DAILY AT 1800    diphenhydrAMINE  25 mg Q6HRS PRN    Or    diphenhydrAMINE  25 mg Q6HRS PRN    ondansetron  4 mg Q4HRS PRN    ondansetron  4 mg Q4HRS PRN    promethazine  12.5-25 mg Q4HRS PRN    promethazine  12.5-25 mg Q4HRS PRN    prochlorperazine   5-10 mg Q4HRS PRN    oxyCODONE immediate-release  5 mg Q3HRS PRN    Or    oxyCODONE immediate-release  10 mg Q3HRS PRN    Or    HYDROmorphone  0.5 mg Q3HRS PRN    lidocaine  2 Patch Q24HR    venlafaxine XR  75 mg QDAY with Breakfast    cyanocobalamin  1,000 mcg DAILY    melatonin  5 mg HS PRN    acetaminophen  650 mg Q6HRS PRN    insulin regular  2-9 Units 4X/DAY ACHS    metoclopramide  10 mg TID PRN    omeprazole  20 mg BID       Assessment and Plan:  Hospital day # 13  POD#4 C4-7 ACDF.  POD#1 redo L5 and L4 laminectomy with PSF.  OK to raise HOB/allow OOB. If patient has postural headache or drainage from incision lay back down flat.   Give muscle relaxers for pain.   Encourage up to chair.  PT/OT.  Ice incisions.   Will likely need rehab after surgeries all completed.  Appreciate hospitalist care.   Following    Chemical prophylactic DVT therapy: Yes - Lovenox 40mg/qd    Start date/time: today     Brain Compression: No

## 2024-06-08 NOTE — HOSPITAL COURSE
Kevin Taylor is a 59-year-old male with a PMHx DMT2, GERD with esophagitis, chronic back pain.  Admitted 5/27 for pneumonia and cauda equina syndrome.    CT C/A/P 5/27: Patchy bilateral infiltrates, greatest in upper right lobe.  Distal esophageal wall thickening    MRI spine showing severe spinal stenosis of cervical and lumbar spine.  S/p C4-C7 ACDF on 6/4 and L4-L5 laminectomies with stealth fusion 6/7.     Patient completed antibiotic course with ceftriaxone and doxycycline on 6/2.  PT OT consults have been placed.

## 2024-06-08 NOTE — PROGRESS NOTES
Hospital Medicine Daily Progress Note    Date of Service  6/8/2024    Chief Complaint  Christoph Taylor is a 59 y.o. male admitted 5/27/2024 with cauda equina syndrome and pneumonia.    Hospital Course  Kevin Taylor is a 59-year-old male with a PMHx DMT2, GERD with esophagitis, chronic back pain.  Admitted 5/27 for pneumonia and cauda equina syndrome.    CT C/A/P 5/27: Patchy bilateral infiltrates, greatest in upper right lobe.  Distal esophageal wall thickening    MRI spine showing severe spinal stenosis of cervical and lumbar spine.  S/p C4-C7 ACDF on 6/4 and L4-L5 laminectomies with stealth fusion 6/7.     Patient completed antibiotic course with ceftriaxone and doxycycline on 6/2.  PT OT consults have been placed.    Interval Problem Update  6/8: Vitals notable for intermittent tachycardia overnight.  Patient remains on 6 LPM via oxymask.  Glucose range 118-191.  POD 4 ACDF; POD 1 L4-5 laminectomy. Slight confusion and combative overnight. Currently in soft restraints, resting comfortably.     I have discussed this patient's plan of care and discharge plan at IDT rounds today with Case Management, Nursing, Nursing leadership, and other members of the IDT team.    Consultants/Specialty  neurosurgery    Code Status  Full Code    Disposition  The patient is not medically cleared for discharge to home or a post-acute facility.      I have placed the appropriate orders for post-discharge needs.    Review of Systems  Review of Systems   Unable to perform ROS: Medical condition        Physical Exam  Temp:  [36.4 °C (97.5 °F)-37.9 °C (100.2 °F)] 37.1 °C (98.8 °F)  Pulse:  [] 105  Resp:  [9-26] 18  BP: ()/(55-86) 117/75  SpO2:  [91 %-97 %] 94 %    Physical Exam  Vitals and nursing note reviewed.   Constitutional:       General: He is not in acute distress.     Appearance: Normal appearance. He is not ill-appearing.      Comments: Resting comfortably, soft restraints in place    HENT:      Head:  Normocephalic.      Comments: C collar in place      Mouth/Throat:      Mouth: Mucous membranes are moist.      Pharynx: Oropharynx is clear.   Cardiovascular:      Rate and Rhythm: Normal rate and regular rhythm.      Heart sounds: Murmur heard.   Pulmonary:      Effort: Pulmonary effort is normal. No respiratory distress.      Breath sounds: Normal breath sounds. No wheezing.   Abdominal:      General: Bowel sounds are normal. There is no distension.      Palpations: Abdomen is soft.   Musculoskeletal:         General: No swelling. Normal range of motion.      Cervical back: Normal range of motion. No tenderness.   Skin:     General: Skin is warm and dry.   Neurological:      General: No focal deficit present.   Psychiatric:         Behavior: Behavior normal.         Fluids    Intake/Output Summary (Last 24 hours) at 6/8/2024 1045  Last data filed at 6/8/2024 0800  Gross per 24 hour   Intake 1750 ml   Output 1305 ml   Net 445 ml       Laboratory  Recent Labs     06/08/24  0834   WBC 11.5*   RBC 3.62*   HEMOGLOBIN 10.0*   HEMATOCRIT 32.3*   MCV 89.2   MCH 27.6   MCHC 31.0*   RDW 44.0   PLATELETCT 187   MPV 8.9*     Recent Labs     06/07/24  2049 06/08/24  0834   SODIUM 139 137   POTASSIUM 4.3 4.2   CHLORIDE 97 98   CO2 31 31   GLUCOSE 191* 253*   BUN 36* 30*   CREATININE 0.87 0.77   CALCIUM 8.5 8.3*                   Imaging  DX-CERVICAL SPINE-2 OR 3 VIEWS   Final Result      Digitized intraoperative radiograph is submitted for review. This examination is not for diagnostic purpose but for guidance during a surgical procedure. Please see the patient's chart for full procedural details.         INTERPRETING LOCATION: [address] [place] [address], [id number]      DX-PORTABLE FLUORO > 1 HOUR   Final Result      Portable fluoroscopy utilized for 8 seconds.         INTERPRETING LOCATION: [address] [place] [address], [address]      MR-CERVICAL SPINE-W/O   Final Result      1.  Multilevel multifactorial degenerative changes   2.  High-grade  central canal narrowing at C4-C5, C5-C6 and C6-C7 with findings suspicious for myelomalacia at C4-C6   3.  Other areas of central canal and neural foraminal narrowing as described above      DX-LUMBAR SPINE-BEND OR FLEX-EXT   Final Result      L1-L4 degenerative retrolisthesis without definite significant hypermobility.      DX-LUMBAR SPINE-2 OR 3 VIEWS   Final Result      1.  Multilevel degenerative changes of the lumbar spine.   2.  Mild grade 1 retrolisthesis visible with extension maneuver at L2-3 and L3-4 which could indicate hypermobility.      MR-THORACIC SPINE-WITH & W/O   Final Result         Remote endplate compression fractures at T1, T2, T12 without significant posterior cortical retropulsion or canal stenosis.      Mild degenerative changes of the thoracic spine without significant canal stenosis or foraminal narrowing.         MR-LUMBAR SPINE-WITH & W/O   Final Result         Severe canal stenosis at L4-5 with cauda equina compression. There is also severe right foraminal narrowing.      Postoperative changes at L5-S1 with a well decompressed canal.      Remote compression fracture involving the superior endplate of T12 with 50% loss of height.      DX-FOOT-2- LEFT   Final Result      No acute osseous abnormality.      Prominent degenerative changes within the mid and hindfoot with pes planus type deformity.      CT-LSPINE W/O PLUS RECONS   Final Result         1.  No acute traumatic bony injury of the lumbar spine.      CT-TSPINE W/O PLUS RECONS   Final Result         1.  No acute traumatic bony injury of the thoracic spine.      CT-CHEST,ABDOMEN,PELVIS WITH   Final Result      DX-PORTABLE FLUORO > 1 HOUR    (Results Pending)   DX-LUMBAR SPINE-2 OR 3 VIEWS    (Results Pending)   DX-O-ARM    (Results Pending)        Assessment/Plan  * Cervical stenosis of spinal canal  Assessment & Plan  S/p C4-C7 ACDF on 6/4  -Continue pain management as requested  - Neurosurgery following  - PT OT consults pending  -  Okay to ice incision sites    Chronic renal insufficiency  Assessment & Plan  Creatinine 0.77  - Repeat BMP in a.m.    Cauda equina compression (HCC)  Assessment & Plan  L4-L5 laminectomies with stealth fusion 6/7  - Continue pain management as requested  - Continuous pulse ox monitoring in the setting of IV narcotics to monitor for respiratory depression  - Has received 1 dose IV Dilaudid today  - PT OT consults pending; PM&R consult pending  - Okay to ice incision sites  - Neurosurgery is following closely  - Okay to raise head of bed; lie flat if postural headache develops  - Cleared by neurosurgery to resume DVT prophylaxis    Fall  Assessment & Plan  Patient did not lose consciousness or hit his head  Complains of neck pain, back pain and hip pain  PT OT eval    Pneumonia due to infectious organism- (present on admission)  Assessment & Plan  CT C/A/P 5/27: Patchy bilateral infiltrates, greatest in upper right lobe.  Follow-up blood cultures and sputum cultures neg  Completed C3 + doxycycline course on 6/2    Acute respiratory failure with hypoxia (HCC)- (present on admission)  Assessment & Plan  Down to 3LPM supplemental o2  - Continue to wean oxygen as able  - Continuous pulse ox monitoring     Thoracic back pain- (present on admission)  Assessment & Plan  Critical spinal stenosis of both C and L spines  Cont supportive care while definitive surgeries are completed    Diabetes mellitus type 2, insulin dependent, poorly controlled with marked hyperglycemia (HCC)- (present on admission)  Assessment & Plan  A1C 12.4  Glucose ranging 153-229 over last 24 hours  - Continue Lantus 15 units BID  - Continue sliding scale insulin  - Monitor BG trend  - Accu-Cheks before meals and at bedtime.   - Goal to keep BG between 140-180 per 2019 ADA guidelines       Acute esophagitis- (present on admission)  Assessment & Plan  Continue Protonix         VTE prophylaxis:   SCDs/TEDs   pharmacologic prophylaxis contraindicated due  to post operative recovery      I have performed a physical exam and reviewed and updated ROS and Plan today (6/8/2024). In review of yesterday's note (6/7/2024), there are no changes except as documented above.

## 2024-06-08 NOTE — OP REPORT
DATE OF SERVICE:  06/07/2024     PREOPERATIVE DIAGNOSES:  1.  Lumbar spondylosis.  2.  Lumbar stenosis.  3.  Lumbar radiculopathy.  4.  Cauda equina syndrome, subacute.  5.  Status post L5-S1 laminectomy fusion by another practitioner 30 years ago.     POSTOPERATIVE DIAGNOSES:  1.  Lumbar spondylosis.  2.  Lumbar stenosis.  3.  Lumbar radiculopathy.  4.  Cauda equina syndrome, subacute.  5.  Status post L5-S1 laminectomy fusion by another practitioner 30 years ago.     PROCEDURES PERFORMED:  1.  Redo bilateral L5 laminotomy, foraminotomy, decompression of thecal sac   and nerve roots.  2. L4 laminectomy.  3.  Bilateral L5 transpedicular approaches facetectomies, 59 modifier.  4.  Stealth guidance for the spine.  5.  Hip bone marrow autograft aspirate concentrate via separate incision.  6.  L4, L5 Stealth-guided pedicle screw fixation.  7.  L4-L5 posterolateral allograft autograft stephania arthrodesis and fusion.  8.  Intraoperative monitoring.     SURGEON:  Moo Pulido III, MD     ASSISTANT:  Mee Le PA-C     ANESTHESIA:  General.     COMPLICATIONS:  None.     ESTIMATED BLOOD LOSS:  200 mL     FINDINGS:  Instability noted with wide facetectomy, necessary to get good   decompression, small pinhole CSF leak closed with a single muscle plug and   blue glue, flat overnight with negative Valsalva x2. Dura noted to very very thinned at L4-5 junction     DRAINS LEFT:  Subfascial EMIL.     DISPOSITION:  Extubated to recovery and to the floor.     HISTORY OF PRESENT ILLNESS:  A 59-year-old man who had already undergone an   admission for cervical spondylitic myelopathy, C4-C7 ACDF and this was the   admission problem of bilateral foot drops and perineal numbness, which is a   subacute fashion.  I explained the risks, benefits, and alternatives, how to   recover from the cervical surgery and the lumbar surgery, and this includes   pain, infection, bleeding, CSF leak, failure to completely resolve symptoms,   neurologic  deficits including pain, weakness, numbness, bowel or bladder   difficulties, failure of fixation, failure of fusion, need for rostral caudal   extension due to junctional stenosis.  He understood the risks, benefits, and   agreed to consent.     SUMMARY OF OPERATIVE PROCEDURE:  The patient was brought to the operating   suite, placed under general anesthesia on a regular bed supine, flipped into   the prone position on a chest, hip, thigh Alexandru table, arms up in superman   position, all padded pressure points secured.  X-ray fluoroscopy was brought   in to localize the previous incision, which, because it was done 30 years ago,   was quite large.  We just noemy out that basically the same incision, a small   portion of it in fact.  Preoperative antibiotics were given.  Proper timeout   was performed.  The patient was prepped and draped in sterile fashion.  We   also noemy out a separate incision for the right PSIS Stealth post.     A linear incision was made and soft tissues dissected with bipolar and   monopolar electrocautery.  Found the dorsal fascia, incised it sharply and   using a subperiosteal technique, stripped the muscles off the L4-L5 lamina out   to the transverse process.  We advanced our retractors, infiltrated muscle   with Marcaine.     Bilateral redo L5 laminotomies, L4 laminectomy:  We used a Leksell rongeur,   removed the spinous process of L4 and used Midas Jakob drill, drilled off the   lamina widely of L4 and then redo bilateral laminotomies at L5.  We carried   this into the facet joint. Sweeping with its learning, resected remaining   bone ligament at the central area.  We encountered, in a pinhole CSF leak midline, a   small area of extremely thin dura that started to leak CSF.  We closed this   with a single 5-0 [place]-Miki stitch with a muscle plug and glue with a negative   Valsalva x2.  The dura here is very poor, the hole opening wider during placement of stitch, necessitating I stop  decompression, else we cause a massive L4/5 junction CSF leak. We turned our attention to the transpedicular approach.     Bilateral L5 transpedicular approaches:  Because of the CSF leak centrally, we   had to do a transpedicular approach and attempting to do this without having   to cause a fusion. With my assisting holding retraction on thecal sac, we   drilled along the pedicles of L5 and unfortunately released the joint and with   the previous CSF leak, we decided to just complete what we did with Kerrison   rongeurs to go to the lateral recess along the L5 pedicle to get as best   decompression as we could.  This got us to the point where the sac was   decompressed on MRI.  We could palpate that with a Mihir although I did not   want to carry that palpation too deep given how thin the dura was.  We decided   we are going to have to perform a fusion now because of the amount of bone   work.     Stealth guidance for the spine with hip bone marrow autograft aspirate   concentrate via separate incision:  We made a small stab incision over the   right PSIS, dissecting the bony prominence at appropriate angle, placed a   small trocar and aspirated out 60 mL of stem cell rich bone marrow blood for   use later.  This spun down with special centrifuge yielding 9 mL mesenchymal   stem cell slurry for use for autograft.  Same angle, we placed Medtronic O-arm   reference frame pin into the patient, draped the patient appropriately, ran   an O-arm spin and all registration points for the screws and accuracy verified   once the incision was opened back up.     L4, L5 Stealth-guided pedicle screw fixation:  Using the [employer] InVictus   screws and the Medtronic O-arm instruments, we made a small corticectomy   guided by the reference frame, all under Stealth, undertapped under Stealth   and at left L4, a 6.5x50 mm screw and right L4 was a 6.5x50 mm screw. At left   L5, a 7.5x50 mm screw and on the right, a 6.5x50 mm screw.   We stimulated the   screws and no stimulation parameters were less than 20 milliamps. Redraped the   patient, ran an O-arm spin, and all trajectories were excellent.     L4-L5 posterolateral allograft autograft stephania arthrodesis and fusion:  We   decorticated the transverse process, lateral facets of L4-L5 and to that, laid   the mixture of the patient's morselized autograft and bone chip allograft   soaked in bone marrow aspirate autograft for onlay fusion arthrodesis. This   supplemented with a 5.5 lordotic titanium stephania, bent with lordosis, laid   neutrally in the setscrews using  torque device, tightened down   locking caps.  We were happy with our final construct.     We copiously with bacitracin-infused saline.  We rechecked the CSF leak and   noted no further CSF leak egress.  We tunneled out a 7.5 fluted EMIL and secured   to skin with stitch.  We infiltrated the muscle with Marcaine.  We closed the   wound in anatomic layers, 0 Vicryl for muscle, 0 Vicryl for the fascia,   another 0 running for the fascia, and then laid down Cellerate collagen powder   because of the previous. This is a redo wound to promote good healing and   then 2-0 Vicryl for the dermis and staples for the skin.  Sterile dressing was   applied.  Separate PSIS incision was closed similarly.  The patient was   rolled from prone to supine and extubated.     There were no complications.  Needle and sponge count correct at the end of   the case.        ______________________________  Moo Pulido III, MD    ECP/ROB    DD:  06/07/2024 16:31  DT:  06/07/2024 17:26    Job#:  [id number]

## 2024-06-08 NOTE — PROGRESS NOTES
4 Eyes Skin Assessment Completed by Mason RN and PENNIE Nicole.    Head WDL  Ears Redness  Nose WDL  Mouth WDL  Neck WDL  Breast/Chest Scab  Shoulder Blades WDL  Spine Redness and Blanching  (R) Arm/Elbow/Hand Redness and Blanching, redness in the wrist  (L) Arm/Elbow/Hand Redness and Blanching, redness in the wrist  Abdomen Scab  Groin WDL  Scrotum/Coccyx/Buttocks WDL  (R) Leg Redness and Abrasion  (L) Leg Redness and Abrasion  (R) Heel/Foot/Toe Redness and Blanching  (L) Heel/Foot/Toe Redness and Blanching          Devices In Places Pulse Ox, SCD's, and Oxy Mask      Interventions In Place Heel Mepilex, Pillows, and Low Air Loss Mattress    Possible Skin Injury No    Pictures Uploaded Into Epic N/A  Wound Consult Placed N/A  RN Wound Prevention Protocol Ordered Yes

## 2024-06-09 PROBLEM — R45.1 AGITATION: Status: ACTIVE | Noted: 2024-06-09

## 2024-06-09 LAB
ANION GAP SERPL CALC-SCNC: 9 MMOL/L (ref 7–16)
BUN SERPL-MCNC: 18 MG/DL (ref 8–22)
CALCIUM SERPL-MCNC: 8.1 MG/DL (ref 8.5–10.5)
CHLORIDE SERPL-SCNC: 97 MMOL/L (ref 96–112)
CO2 SERPL-SCNC: 31 MMOL/L (ref 20–33)
CREAT SERPL-MCNC: 0.63 MG/DL (ref 0.5–1.4)
ERYTHROCYTE [DISTWIDTH] IN BLOOD BY AUTOMATED COUNT: 44.2 FL (ref 35.9–50)
GFR SERPLBLD CREATININE-BSD FMLA CKD-EPI: 109 ML/MIN/1.73 M 2
GLUCOSE BLD STRIP.AUTO-MCNC: 158 MG/DL (ref 65–99)
GLUCOSE BLD STRIP.AUTO-MCNC: 207 MG/DL (ref 65–99)
GLUCOSE BLD STRIP.AUTO-MCNC: 227 MG/DL (ref 65–99)
GLUCOSE BLD STRIP.AUTO-MCNC: 236 MG/DL (ref 65–99)
GLUCOSE SERPL-MCNC: 222 MG/DL (ref 65–99)
HCT VFR BLD AUTO: 31.4 % (ref 42–52)
HGB BLD-MCNC: 9.9 G/DL (ref 14–18)
LACTATE SERPL-SCNC: 0.9 MMOL/L (ref 0.5–2)
MAGNESIUM SERPL-MCNC: 1.9 MG/DL (ref 1.5–2.5)
MCH RBC QN AUTO: 28.2 PG (ref 27–33)
MCHC RBC AUTO-ENTMCNC: 31.5 G/DL (ref 32.3–36.5)
MCV RBC AUTO: 89.5 FL (ref 81.4–97.8)
PLATELET # BLD AUTO: 188 K/UL (ref 164–446)
PMV BLD AUTO: 8.7 FL (ref 9–12.9)
POTASSIUM SERPL-SCNC: 4.2 MMOL/L (ref 3.6–5.5)
RBC # BLD AUTO: 3.51 M/UL (ref 4.7–6.1)
SODIUM SERPL-SCNC: 137 MMOL/L (ref 135–145)
WBC # BLD AUTO: 12.2 K/UL (ref 4.8–10.8)

## 2024-06-09 PROCEDURE — 700101 HCHG RX REV CODE 250

## 2024-06-09 PROCEDURE — 700102 HCHG RX REV CODE 250 W/ 637 OVERRIDE(OP): Performed by: HOSPITALIST

## 2024-06-09 PROCEDURE — 700102 HCHG RX REV CODE 250 W/ 637 OVERRIDE(OP): Performed by: INTERNAL MEDICINE

## 2024-06-09 PROCEDURE — 99232 SBSQ HOSP IP/OBS MODERATE 35: CPT | Performed by: STUDENT IN AN ORGANIZED HEALTH CARE EDUCATION/TRAINING PROGRAM

## 2024-06-09 PROCEDURE — 80048 BASIC METABOLIC PNL TOTAL CA: CPT

## 2024-06-09 PROCEDURE — A9270 NON-COVERED ITEM OR SERVICE: HCPCS | Performed by: INTERNAL MEDICINE

## 2024-06-09 PROCEDURE — 700102 HCHG RX REV CODE 250 W/ 637 OVERRIDE(OP)

## 2024-06-09 PROCEDURE — 700111 HCHG RX REV CODE 636 W/ 250 OVERRIDE (IP): Mod: JZ | Performed by: STUDENT IN AN ORGANIZED HEALTH CARE EDUCATION/TRAINING PROGRAM

## 2024-06-09 PROCEDURE — 700105 HCHG RX REV CODE 258: Performed by: STUDENT IN AN ORGANIZED HEALTH CARE EDUCATION/TRAINING PROGRAM

## 2024-06-09 PROCEDURE — 36415 COLL VENOUS BLD VENIPUNCTURE: CPT

## 2024-06-09 PROCEDURE — 83605 ASSAY OF LACTIC ACID: CPT

## 2024-06-09 PROCEDURE — 770001 HCHG ROOM/CARE - MED/SURG/GYN PRIV*

## 2024-06-09 PROCEDURE — 700111 HCHG RX REV CODE 636 W/ 250 OVERRIDE (IP): Performed by: STUDENT IN AN ORGANIZED HEALTH CARE EDUCATION/TRAINING PROGRAM

## 2024-06-09 PROCEDURE — 85027 COMPLETE CBC AUTOMATED: CPT

## 2024-06-09 PROCEDURE — A9270 NON-COVERED ITEM OR SERVICE: HCPCS

## 2024-06-09 PROCEDURE — 82962 GLUCOSE BLOOD TEST: CPT

## 2024-06-09 PROCEDURE — 83735 ASSAY OF MAGNESIUM: CPT

## 2024-06-09 PROCEDURE — A9270 NON-COVERED ITEM OR SERVICE: HCPCS | Performed by: HOSPITALIST

## 2024-06-09 RX ORDER — ENOXAPARIN SODIUM 100 MG/ML
40 INJECTION SUBCUTANEOUS DAILY
Status: DISCONTINUED | OUTPATIENT
Start: 2024-06-09 | End: 2024-06-11 | Stop reason: HOSPADM

## 2024-06-09 RX ADMIN — Medication 1 APPLICATOR: at 16:07

## 2024-06-09 RX ADMIN — OMEPRAZOLE 20 MG: 20 CAPSULE, DELAYED RELEASE ORAL at 16:07

## 2024-06-09 RX ADMIN — INSULIN HUMAN 2 UNITS: 100 INJECTION, SOLUTION PARENTERAL at 20:18

## 2024-06-09 RX ADMIN — LIDOCAINE 2 PATCH: 4 PATCH TOPICAL at 05:08

## 2024-06-09 RX ADMIN — ENOXAPARIN SODIUM 40 MG: 100 INJECTION SUBCUTANEOUS at 17:15

## 2024-06-09 RX ADMIN — OXYCODONE HYDROCHLORIDE 10 MG: 10 TABLET ORAL at 05:09

## 2024-06-09 RX ADMIN — DOCUSATE SODIUM 100 MG: 100 CAPSULE, LIQUID FILLED ORAL at 05:09

## 2024-06-09 RX ADMIN — INSULIN HUMAN 3 UNITS: 100 INJECTION, SOLUTION PARENTERAL at 09:16

## 2024-06-09 RX ADMIN — INSULIN HUMAN 3 UNITS: 100 INJECTION, SOLUTION PARENTERAL at 17:20

## 2024-06-09 RX ADMIN — METHOCARBAMOL 1000 MG: 100 INJECTION, SOLUTION INTRAMUSCULAR; INTRAVENOUS at 05:20

## 2024-06-09 RX ADMIN — OXYCODONE HYDROCHLORIDE 10 MG: 10 TABLET ORAL at 16:06

## 2024-06-09 RX ADMIN — INSULIN GLARGINE-YFGN 15 UNITS: 100 INJECTION, SOLUTION SUBCUTANEOUS at 17:20

## 2024-06-09 RX ADMIN — DOCUSATE SODIUM 100 MG: 100 CAPSULE, LIQUID FILLED ORAL at 16:06

## 2024-06-09 RX ADMIN — OXYCODONE HYDROCHLORIDE 10 MG: 10 TABLET ORAL at 20:14

## 2024-06-09 RX ADMIN — SENNOSIDES AND DOCUSATE SODIUM 1 TABLET: 50; 8.6 TABLET ORAL at 20:15

## 2024-06-09 RX ADMIN — VENLAFAXINE HYDROCHLORIDE 75 MG: 75 CAPSULE, EXTENDED RELEASE ORAL at 09:22

## 2024-06-09 RX ADMIN — OXYCODONE HYDROCHLORIDE 10 MG: 10 TABLET ORAL at 01:04

## 2024-06-09 RX ADMIN — OMEPRAZOLE 20 MG: 20 CAPSULE, DELAYED RELEASE ORAL at 05:09

## 2024-06-09 RX ADMIN — CYANOCOBALAMIN TAB 500 MCG 1000 MCG: 500 TAB at 05:09

## 2024-06-09 RX ADMIN — Medication 2000 UNITS: at 05:09

## 2024-06-09 RX ADMIN — METHOCARBAMOL 1000 MG: 100 INJECTION, SOLUTION INTRAMUSCULAR; INTRAVENOUS at 16:35

## 2024-06-09 RX ADMIN — Medication 1 APPLICATOR: at 05:09

## 2024-06-09 RX ADMIN — METHOCARBAMOL 1000 MG: 100 INJECTION, SOLUTION INTRAMUSCULAR; INTRAVENOUS at 22:14

## 2024-06-09 RX ADMIN — INSULIN HUMAN 3 UNITS: 100 INJECTION, SOLUTION PARENTERAL at 13:41

## 2024-06-09 RX ADMIN — INSULIN GLARGINE-YFGN 15 UNITS: 100 INJECTION, SOLUTION SUBCUTANEOUS at 09:17

## 2024-06-09 ASSESSMENT — PAIN DESCRIPTION - PAIN TYPE
TYPE: ACUTE PAIN;SURGICAL PAIN
TYPE: ACUTE PAIN;SURGICAL PAIN
TYPE: ACUTE PAIN
TYPE: ACUTE PAIN;SURGICAL PAIN

## 2024-06-09 NOTE — PROGRESS NOTES
Hospital Medicine Daily Progress Note    Date of Service  6/9/2024    Chief Complaint  Christoph Taylor is a 59 y.o. male admitted 5/27/2024 with cauda equina syndrome and pneumonia.    Hospital Course  Kevin Taylor is a 59-year-old male with a PMHx DMT2, GERD with esophagitis, chronic back pain.  Admitted 5/27 for pneumonia and cauda equina syndrome.    CT C/A/P 5/27: Patchy bilateral infiltrates, greatest in upper right lobe.  Distal esophageal wall thickening    MRI spine showing severe spinal stenosis of cervical and lumbar spine.  S/p C4-C7 ACDF on 6/4 and L4-L5 laminectomies with stealth fusion 6/7.     Patient completed antibiotic course with ceftriaxone and doxycycline on 6/2.  PT OT consults have been placed.    Interval Problem Update  6/8: Vitals notable for intermittent tachycardia overnight.  Patient remains on 6 LPM via oxymask.  Glucose range 118-191.  POD 4 ACDF; POD 1 L4-5 laminectomy. Slight confusion and combative overnight. Currently in soft restraints, resting comfortably.     6/9: Vitals notable for improvement in tachycardia.  Remains on 2 LPM supplemental O2. POD 5 ACDF; POD 2 L4-5 laminectomy.  PT OT recommending postacute rehabilitation.  Placement is pending. Discontinue soft restraints today. Oxygen desaturations to 40-50% without supplemental O2.     I have discussed this patient's plan of care and discharge plan at IDT rounds today with Case Management, Nursing, Nursing leadership, and other members of the IDT team.    Consultants/Specialty  neurosurgery    Code Status  Full Code    Disposition  The patient is not medically cleared for discharge to home or a post-acute facility.  Anticipate discharge to: skilled nursing facility    I have placed the appropriate orders for post-discharge needs.    Review of Systems  Review of Systems   Unable to perform ROS: Medical condition        Physical Exam  Temp:  [36.3 °C (97.4 °F)-36.8 °C (98.2 °F)] 36.7 °C (98 °F)  Pulse:  []  98  Resp:  [16-18] 17  BP: (111-137)/(64-82) 113/64  SpO2:  [92 %-99 %] 92 %    Physical Exam  Vitals and nursing note reviewed.   Constitutional:       General: He is not in acute distress.     Appearance: Normal appearance. He is ill-appearing.      Comments: Resting comfortably, soft restraints in place    HENT:      Head:      Comments: C collar in place   Neck:      Comments: Decreased ROM through cervical spine  Cardiovascular:      Rate and Rhythm: Normal rate and regular rhythm.      Heart sounds: Murmur heard.   Abdominal:      General: Bowel sounds are normal. There is no distension.      Palpations: Abdomen is soft.   Musculoskeletal:         General: No swelling.   Skin:     General: Skin is warm and dry.   Neurological:      General: No focal deficit present.   Psychiatric:         Behavior: Behavior normal.         Fluids    Intake/Output Summary (Last 24 hours) at 6/9/2024 0916  Last data filed at 6/9/2024 0900  Gross per 24 hour   Intake 620 ml   Output 560 ml   Net 60 ml       Laboratory  Recent Labs     06/08/24  0834 06/09/24  0628   WBC 11.5* 12.2*   RBC 3.62* 3.51*   HEMOGLOBIN 10.0* 9.9*   HEMATOCRIT 32.3* 31.4*   MCV 89.2 89.5   MCH 27.6 28.2   MCHC 31.0* 31.5*   RDW 44.0 44.2   PLATELETCT 187 188   MPV 8.9* 8.7*     Recent Labs     06/07/24 2049 06/08/24  0834 06/09/24  0628   SODIUM 139 137 137   POTASSIUM 4.3 4.2 4.2   CHLORIDE 97 98 97   CO2 31 31 31   GLUCOSE 191* 253* 222*   BUN 36* 30* 18   CREATININE 0.87 0.77 0.63   CALCIUM 8.5 8.3* 8.1*                   Imaging  DX-CERVICAL SPINE-2 OR 3 VIEWS   Final Result      Digitized intraoperative radiograph is submitted for review. This examination is not for diagnostic purpose but for guidance during a surgical procedure. Please see the patient's chart for full procedural details.         INTERPRETING LOCATION: [address] [place] [address], [id number]      DX-PORTABLE FLUORO > 1 HOUR   Final Result      Portable fluoroscopy utilized for 8 seconds.          INTERPRETING LOCATION: [address] [place] [address], [id number]      MR-CERVICAL SPINE-W/O   Final Result      1.  Multilevel multifactorial degenerative changes   2.  High-grade central canal narrowing at C4-C5, C5-C6 and C6-C7 with findings suspicious for myelomalacia at C4-C6   3.  Other areas of central canal and neural foraminal narrowing as described above      DX-LUMBAR SPINE-BEND OR FLEX-EXT   Final Result      L1-L4 degenerative retrolisthesis without definite significant hypermobility.      DX-LUMBAR SPINE-2 OR 3 VIEWS   Final Result      1.  Multilevel degenerative changes of the lumbar spine.   2.  Mild grade 1 retrolisthesis visible with extension maneuver at L2-3 and L3-4 which could indicate hypermobility.      MR-THORACIC SPINE-WITH & W/O   Final Result         Remote endplate compression fractures at T1, T2, T12 without significant posterior cortical retropulsion or canal stenosis.      Mild degenerative changes of the thoracic spine without significant canal stenosis or foraminal narrowing.         MR-LUMBAR SPINE-WITH & W/O   Final Result         Severe canal stenosis at L4-5 with cauda equina compression. There is also severe right foraminal narrowing.      Postoperative changes at L5-S1 with a well decompressed canal.      Remote compression fracture involving the superior endplate of T12 with 50% loss of height.      DX-FOOT-2- LEFT   Final Result      No acute osseous abnormality.      Prominent degenerative changes within the mid and hindfoot with pes planus type deformity.      CT-LSPINE W/O PLUS RECONS   Final Result         1.  No acute traumatic bony injury of the lumbar spine.      CT-TSPINE W/O PLUS RECONS   Final Result         1.  No acute traumatic bony injury of the thoracic spine.      CT-CHEST,ABDOMEN,PELVIS WITH   Final Result      DX-PORTABLE FLUORO > 1 HOUR    (Results Pending)   DX-LUMBAR SPINE-2 OR 3 VIEWS    (Results Pending)   DX-O-ARM    (Results Pending)         Assessment/Plan  * Cervical stenosis of spinal canal  Assessment & Plan  S/p C4-C7 ACDF on 6/4  -Continue pain management as requested  - Neurosurgery following  - PT OT recommending PAR; placement pending  - Okay to ice incision sites    Agitation  Assessment & Plan  Post operative agitation present  Required soft restraints  - Discontinue soft restraints now  - Allow for sleep protection    Chronic renal insufficiency  Assessment & Plan  Creatinine 0.63; stable   Tolerating PO intake     Cauda equina compression (HCC)  Assessment & Plan  L4-L5 laminectomies with stealth fusion 6/7  - Continue pain management as requested; no IV pain medications in last 24 hours   - PT OT recommending PAR; placement pending; PM&R consult pending  - Okay to ice incision sites  - Neurosurgery is following closely  - Okay to raise head of bed; lie flat if postural headache develops  - Cleared by neurosurgery to resume DVT prophylaxis    Fall  Assessment & Plan  Patient did not lose consciousness or hit his head  Complains of neck pain, back pain and hip pain  PT OT recommending PAR    Pneumonia due to infectious organism- (present on admission)  Assessment & Plan  CT C/A/P 5/27: Patchy bilateral infiltrates, greatest in upper right lobe.  Follow-up blood cultures and sputum cultures neg  Completed C3 + doxycycline course on 6/2    Acute respiratory failure with hypoxia (HCC)- (present on admission)  Assessment & Plan  Desaturations 40-50% without oxy mask in place  Continue supplemental o2; currently 4LPM  - Continue to wean oxygen as able  - Continuous pulse ox monitoring     Thoracic back pain- (present on admission)  Assessment & Plan  Critical spinal stenosis of both C and L spines  - Continue supportive care    Diabetes mellitus type 2, insulin dependent, poorly controlled with marked hyperglycemia (HCC)- (present on admission)  Assessment & Plan  A1C 12.4  Glucose ranging 123-204 over last 24 hours  - Continue Lantus 15  units BID  - Continue sliding scale insulin; 5 units over last 24 hours  - Monitor BG trend  - Accu-Cheks before meals and at bedtime.   - Goal to keep BG between 140-180 per 2019 ADA guidelines       Acute esophagitis- (present on admission)  Assessment & Plan  Continue Protonix         VTE prophylaxis:    enoxaparin ppx      I have performed a physical exam and reviewed and updated ROS and Plan today (6/9/2024). In review of yesterday's note (6/8/2024), there are no changes except as documented above.

## 2024-06-09 NOTE — ASSESSMENT & PLAN NOTE
Post operative agitation; has resolved  Required soft restraints; discontinued 6/9  - Allow for sleep protection   [place] CST

## 2024-06-09 NOTE — CARE PLAN
The patient is Stable - Low risk of patient condition declining or worsening    Shift Goals  Clinical Goals: Safety, O2 saturations  Patient Goals: comfort  Family Goals: not present    Progress made toward(s) clinical / shift goals:    Problem: Pain - Standard  Goal: Alleviation of pain or a reduction in pain to the patient’s comfort goal  Outcome: Progressing     Problem: Fall Risk  Goal: Patient will remain free from falls  Outcome: Progressing     Problem: Skin Integrity  Goal: Skin integrity is maintained or improved  Outcome: Progressing       Patient is not progressing towards the following goals:

## 2024-06-09 NOTE — THERAPY
Physical Therapy   Daily Treatment     Patient Name: Christoph Taylor  Age:  59 y.o., Sex:  male  Medical Record #: [id number]  Today's Date: 6/8/2024     Precautions  Precautions: Fall Risk;Cervical Collar  ;Spinal / Back Precautions   Comments: L AFO in room, c-collar AAT except showers and meals per order    Assessment  Pt seen for PT tx following C4-7 ACDF on 6/4 and redo L4-5 laminectomy with stealth fusion and small durotomy repair on 6/7. Pt demonstrates decreased strength, alertness, balance, and activity tolerance after his procedures. However, pt reported no HA or increased head pain when brought up in bed or to EOB. Pt needing Total A for bed mobility, however anticipate this can be due to increased medication and sedation since pt's last surgery. Will continue to work with pt for PT and complete more accurate reassessment after pt's procedures as he is more alert and able to participate more. Continue to recommend placement at this time, will follow.     Plan    Treatment Plan Status: Continue Current Treatment Plan  Type of Treatment: Equipment, Gait Training, Stair Training, Therapeutic Activities  Treatment Frequency: 4 Times per Week  Treatment Duration: Until Therapy Goals Met    DC Equipment Recommendations: Unable to determine at this time  Discharge Recommendations: Recommend post-acute placement for additional physical therapy services prior to discharge home        Vitals   O2 (LPM) 2   O2 Delivery Device Oxymask   Pain 0 - 10 Group   Location Back   Pain Rating Scale (NPRS)   (not quantified)   Description Aching   Therapist Pain Assessment During Activity   Cognition    Cognition / Consciousness X   Speech/ Communication Delayed Responses   Level of Consciousness Responds to voice   Attention Impaired   Sequencing Impaired   Comments pt delayed with poor attention, likely due to medication, anesthesia, and sedation since surgery   Balance   Sitting Balance (Static) Fair -   Sitting Balance  (Dynamic) Poor +   Weight Shift Sitting Poor   Skilled Intervention Verbal Cuing;Postural Facilitation   Comments standing NT due to pain and pt request   Bed Mobility    Supine to Sit Total Assist   Sit to Supine Total Assist   Scooting Total Assist   Skilled Intervention Verbal Cuing   Gait Analysis   Gait Level Of Assist Unable to Participate   Functional Mobility   Sit to Stand Refused   Bed, Chair, Wheelchair Transfer Refused   Mobility EOB only   Comments pt declined standing activity due to pain   6 Clicks Assessment - How much HELP from from another person do you currently need... (If the patient hasn't done an activity recently, how much help from another person do you think he/she would need if he/she tried?)   Turning from your back to your side while in a flat bed without using bedrails? 2   Moving from lying on your back to sitting on the side of a flat bed without using bedrails? 2   Moving to and from a bed to a chair (including a wheelchair)? 2   Standing up from a chair using your arms (e.g., wheelchair, or bedside chair)? 2   Walking in hospital room? 1   Climbing 3-5 steps with a railing? 1   6 clicks Mobility Score 10   Short Term Goals    Short Term Goal # 1 Patient will perform sit-stand with LRAD with supervision in 6 visits to progress with mobility   Goal Outcome # 1 goal not met   Short Term Goal # 2 Patient will perform chair transfers with LRAD with supervision in 6 visits to safely get OOB to chair   Goal Outcome # 2 Goal not met   Short Term Goal # 3 Patient will ambulate 100 feet with LRAD & AFO with supervision in 6 visits to safely ambulate household distance   Goal Outcome # 3 Goal not met   Short Term Goal # 4 Patient will negotiate 6 steps with LRAD with supervision in 6 visits to safely get in & out of his house   Goal Outcome # 4 Goal not met   Short Term Goal # 5 pt will move supine<>eob with spv in 6 tx for bed mobility.   Physical Therapy Treatment Plan   Physical Therapy  Treatment Plan Continue Current Treatment Plan   Anticipated Discharge Equipment and Recommendations   DC Equipment Recommendations Unable to determine at this time   Discharge Recommendations Recommend post-acute placement for additional physical therapy services prior to discharge home   Interdisciplinary Plan of Care Collaboration   IDT Collaboration with  Nursing   Patient Position at End of Therapy In Bed;Bed Alarm On;Call Light within Reach;Tray Table within Reach;Phone within Reach  (RN at bedside)   Collaboration Comments RN present for tx   Session Information   Date / Session Number  6/8- 2 (1/4, 6/10), hold 6/4, 6/6 for colleen

## 2024-06-09 NOTE — PROGRESS NOTES
Neurosurgery Progress Note    Subjective:  59 yo man presented with a fall about one week ago with resultant increased right foot weakness. MRI shows critical L4-5 spondylosis and stenosis. Has a history of a prior L5-S1 onlay fusion 30 years ago.     Reporting back pain and right greater than left lower extremity radiculopathy.   Right foot weakness requiring AFO brace. Has bene having loss of bowels for several months.    Lumbar x-rays 6/ show multilevel spondylosis with grade 1 retrolisthesis of L2 on L3 and L3 on L4. No dynamic instability.  Cervical MRI 6/ shows multilevel severe central canal stenosis C4-7 with possible cord signal change C4-6.    POD#5 C4-7 ACDF.  Doing fine, denies significant UE pain, neck pain as expected.  Swallowing ok, but with some discomfort.    POD#2 redo L4/5 laminectomy with PSF.  Reporting back pain but improved from yesterday.   Persistent bilateral buttock pain.   Had durotomy intra-op, denies postural headaches.   Minimal ambulation.   Crowley in for retention.     Pending rehab.       Exam:  Bilateral interossei wasting, motor 3+/5 bilateral triceps, 4-/5 bilateral interossei.  Motor right EHL/AT 4/5, limited due to flat foot on the left with 4/5, othwerise 5/5 in bilateral lower extremities.   Sensation intact except distally in the lower extremities  Anterior cervical dressing c/d/I.  Aspen cervical collar in place.  Voice fine.   Lumbar incision CDI with no active drainage.     Drain with [place] output.     BP  Min: 111/78  Max: 137/82  Pulse  Av.2  Min: 86  Max: 100  Resp  Av.2  Min: 16  Max: 18  Temp  Av.6 °C (97.9 °F)  Min: 36.3 °C (97.4 °F)  Max: 36.8 °C (98.2 °F)  SpO2  Av.5 %  Min: 92 %  Max: 99 %    No data recorded    Recent Labs     24  0834 24  0628   WBC 11.5* 12.2*   RBC 3.62* 3.51*   HEMOGLOBIN 10.0* 9.9*   HEMATOCRIT 32.3* 31.4*   MCV 89.2 89.5   MCH 27.6 28.2   MCHC 31.0* 31.5*   RDW 44.0 44.2   PLATELETCT 187 188   MPV 8.9*  8.7*       Recent Labs     06/07/24 2049 06/08/24  0834 06/09/24  0628   SODIUM 139 137 137   POTASSIUM 4.3 4.2 4.2   CHLORIDE 97 98 97   CO2 31 31 31   GLUCOSE 191* 253* 222*   BUN 36* 30* 18   CREATININE 0.87 0.77 0.63   CALCIUM 8.5 8.3* 8.1*                   Intake/Output                         06/08/24 0700 - 06/09/24 0659 06/09/24 0700 - 06/10/24 0659     0700-1859 1900-0659 Total 0700-1859 1900-0659 Total                 Intake    P.O.  690  240 930  --  -- --    P.O. 690 240 930 -- -- --    Total Intake 690 240 930 -- -- --       Output    Urine  200  -- 200  --  -- --    Number of Times Voided 5 x 1 x 6 x -- -- --    Urine Void (mL) 200 -- 200 -- -- --    Drains  280  280 560  100  -- 100    Output (mL) (Closed/Suction Drain 1 Midline Back Alexandru Michelle) 280 280 560 100 -- 100    Stool  --  -- --  --  -- --    Number of Times Stooled 1 x -- 1 x -- -- --    Total Output 480 280 760 100 -- 100       Net I/O     210 -40 170 -100 -- -100              Intake/Output Summary (Last 24 hours) at 6/9/2024 1026  Last data filed at 6/9/2024 0900  Gross per 24 hour   Intake 620 ml   Output 560 ml   Net 60 ml             enoxaparin (LOVENOX) injection  40 mg DAILY AT 1800    methocarbamol (Robaxin) 1,000 mg in  mL IVPB  1,000 mg Q8HRS    methocarbamol  500 mg Q8HRS PRN    Nozin nasal  swab  1 Applicator BID    vitamin D3  2,000 Units DAILY    insulin GLARGINE  15 Units BID    dextrose  250 mL Q15 MIN PRN    Pharmacy Consult Request  1 Each PHARMACY TO DOSE    MD ALERT...DO NOT ADMINISTER NSAIDS or ASPIRIN unless ORDERED By Neurosurgery  1 Each PRN    docusate sodium  100 mg BID    senna-docusate  1 Tablet Nightly    senna-docusate  1 Tablet Q24HRS PRN    polyethylene glycol/lytes  1 Packet BID PRN    magnesium hydroxide  30 mL QDAY PRN    bisacodyl  10 mg Q24HRS PRN    sodium phosphate  1 Each Once PRN    diphenhydrAMINE  25 mg Q6HRS PRN    Or    diphenhydrAMINE  25 mg Q6HRS PRN    ondansetron  4 mg  Q4HRS PRN    ondansetron  4 mg Q4HRS PRN    promethazine  12.5-25 mg Q4HRS PRN    promethazine  12.5-25 mg Q4HRS PRN    prochlorperazine  5-10 mg Q4HRS PRN    oxyCODONE immediate-release  5 mg Q3HRS PRN    Or    oxyCODONE immediate-release  10 mg Q3HRS PRN    Or    HYDROmorphone  0.5 mg Q3HRS PRN    lidocaine  2 Patch Q24HR    venlafaxine XR  75 mg QDAY with Breakfast    cyanocobalamin  1,000 mcg DAILY    melatonin  5 mg HS PRN    acetaminophen  650 mg Q6HRS PRN    insulin regular  2-9 Units 4X/DAY ACHS    metoclopramide  10 mg TID PRN    omeprazole  20 mg BID       Assessment and Plan:  Hospital day # 14  POD#5 C4-7 ACDF.  POD#2 redo L5 and L4 laminectomy with PSF.  PT/OT, encourage up to chair.  Crowley challenge per primary team.   Ice incisions.   Continue IV robaxin.  Rehab pending.  Keep drain, 1/2 compression.   Appreciate hospitalist care.   Following    Chemical prophylactic DVT therapy: Yes - Lovenox 40mg/qd    Start date/time: today     Brain Compression: No

## 2024-06-09 NOTE — CARE PLAN
The patient is Stable - Low risk of patient condition declining or worsening    Shift Goals  Clinical Goals: safety  Patient Goals: NA  Family Goals: NA    Progress made toward(s) clinical / shift goals:  yes  Problem: Pain - Standard  Goal: Alleviation of pain or a reduction in pain to the patient’s comfort goal  Outcome: Progressing     Problem: Fall Risk  Goal: Patient will remain free from falls  Outcome: Progressing       Patient is not progressing towards the following goals:

## 2024-06-09 NOTE — PROGRESS NOTES
Patient knows himself. Patient stated pain. Meds administered per MAR as well as pain med.  Patient on soft restraint.

## 2024-06-10 LAB
ERYTHROCYTE [DISTWIDTH] IN BLOOD BY AUTOMATED COUNT: 42.5 FL (ref 35.9–50)
GLUCOSE BLD STRIP.AUTO-MCNC: 121 MG/DL (ref 65–99)
GLUCOSE BLD STRIP.AUTO-MCNC: 128 MG/DL (ref 65–99)
GLUCOSE BLD STRIP.AUTO-MCNC: 142 MG/DL (ref 65–99)
GLUCOSE BLD STRIP.AUTO-MCNC: 149 MG/DL (ref 65–99)
HCT VFR BLD AUTO: 27.2 % (ref 42–52)
HGB BLD-MCNC: 8.7 G/DL (ref 14–18)
MCH RBC QN AUTO: 27.8 PG (ref 27–33)
MCHC RBC AUTO-ENTMCNC: 32 G/DL (ref 32.3–36.5)
MCV RBC AUTO: 86.9 FL (ref 81.4–97.8)
PLATELET # BLD AUTO: 183 K/UL (ref 164–446)
PMV BLD AUTO: 9.2 FL (ref 9–12.9)
RBC # BLD AUTO: 3.13 M/UL (ref 4.7–6.1)
WBC # BLD AUTO: 7.6 K/UL (ref 4.8–10.8)

## 2024-06-10 PROCEDURE — 99232 SBSQ HOSP IP/OBS MODERATE 35: CPT | Performed by: STUDENT IN AN ORGANIZED HEALTH CARE EDUCATION/TRAINING PROGRAM

## 2024-06-10 PROCEDURE — 700111 HCHG RX REV CODE 636 W/ 250 OVERRIDE (IP): Performed by: STUDENT IN AN ORGANIZED HEALTH CARE EDUCATION/TRAINING PROGRAM

## 2024-06-10 PROCEDURE — 97535 SELF CARE MNGMENT TRAINING: CPT

## 2024-06-10 PROCEDURE — A9270 NON-COVERED ITEM OR SERVICE: HCPCS

## 2024-06-10 PROCEDURE — 85027 COMPLETE CBC AUTOMATED: CPT

## 2024-06-10 PROCEDURE — 770001 HCHG ROOM/CARE - MED/SURG/GYN PRIV*

## 2024-06-10 PROCEDURE — 700105 HCHG RX REV CODE 258: Performed by: STUDENT IN AN ORGANIZED HEALTH CARE EDUCATION/TRAINING PROGRAM

## 2024-06-10 PROCEDURE — A9270 NON-COVERED ITEM OR SERVICE: HCPCS | Performed by: STUDENT IN AN ORGANIZED HEALTH CARE EDUCATION/TRAINING PROGRAM

## 2024-06-10 PROCEDURE — A9270 NON-COVERED ITEM OR SERVICE: HCPCS | Performed by: INTERNAL MEDICINE

## 2024-06-10 PROCEDURE — 700102 HCHG RX REV CODE 250 W/ 637 OVERRIDE(OP)

## 2024-06-10 PROCEDURE — 36415 COLL VENOUS BLD VENIPUNCTURE: CPT

## 2024-06-10 PROCEDURE — A9270 NON-COVERED ITEM OR SERVICE: HCPCS | Performed by: HOSPITALIST

## 2024-06-10 PROCEDURE — 700102 HCHG RX REV CODE 250 W/ 637 OVERRIDE(OP): Performed by: INTERNAL MEDICINE

## 2024-06-10 PROCEDURE — 700101 HCHG RX REV CODE 250

## 2024-06-10 PROCEDURE — 302181 SUTURE/DEBRIDEMENT SET: Performed by: STUDENT IN AN ORGANIZED HEALTH CARE EDUCATION/TRAINING PROGRAM

## 2024-06-10 PROCEDURE — 97530 THERAPEUTIC ACTIVITIES: CPT

## 2024-06-10 PROCEDURE — 82962 GLUCOSE BLOOD TEST: CPT | Mod: 91

## 2024-06-10 PROCEDURE — 700101 HCHG RX REV CODE 250: Performed by: STUDENT IN AN ORGANIZED HEALTH CARE EDUCATION/TRAINING PROGRAM

## 2024-06-10 PROCEDURE — 700102 HCHG RX REV CODE 250 W/ 637 OVERRIDE(OP): Performed by: HOSPITALIST

## 2024-06-10 PROCEDURE — 302169 3/0 SUTURE W/NEEDLE: Performed by: STUDENT IN AN ORGANIZED HEALTH CARE EDUCATION/TRAINING PROGRAM

## 2024-06-10 PROCEDURE — 700111 HCHG RX REV CODE 636 W/ 250 OVERRIDE (IP): Mod: JZ | Performed by: STUDENT IN AN ORGANIZED HEALTH CARE EDUCATION/TRAINING PROGRAM

## 2024-06-10 PROCEDURE — 700102 HCHG RX REV CODE 250 W/ 637 OVERRIDE(OP): Performed by: STUDENT IN AN ORGANIZED HEALTH CARE EDUCATION/TRAINING PROGRAM

## 2024-06-10 RX ORDER — LIDOCAINE HYDROCHLORIDE AND EPINEPHRINE 10; 10 MG/ML; UG/ML
5 INJECTION, SOLUTION INFILTRATION; PERINEURAL ONCE
Status: COMPLETED | OUTPATIENT
Start: 2024-06-10 | End: 2024-06-10

## 2024-06-10 RX ADMIN — Medication 1 APPLICATOR: at 05:31

## 2024-06-10 RX ADMIN — OMEPRAZOLE 20 MG: 20 CAPSULE, DELAYED RELEASE ORAL at 05:26

## 2024-06-10 RX ADMIN — OXYCODONE HYDROCHLORIDE 10 MG: 10 TABLET ORAL at 20:57

## 2024-06-10 RX ADMIN — LIDOCAINE 2 PATCH: 4 PATCH TOPICAL at 03:31

## 2024-06-10 RX ADMIN — CYANOCOBALAMIN TAB 500 MCG 1000 MCG: 500 TAB at 05:26

## 2024-06-10 RX ADMIN — DOCUSATE SODIUM 100 MG: 100 CAPSULE, LIQUID FILLED ORAL at 16:46

## 2024-06-10 RX ADMIN — ENOXAPARIN SODIUM 40 MG: 100 INJECTION SUBCUTANEOUS at 16:46

## 2024-06-10 RX ADMIN — INSULIN GLARGINE-YFGN 15 UNITS: 100 INJECTION, SOLUTION SUBCUTANEOUS at 05:32

## 2024-06-10 RX ADMIN — OXYCODONE HYDROCHLORIDE 10 MG: 10 TABLET ORAL at 14:23

## 2024-06-10 RX ADMIN — VENLAFAXINE HYDROCHLORIDE 75 MG: 75 CAPSULE, EXTENDED RELEASE ORAL at 08:31

## 2024-06-10 RX ADMIN — LIDOCAINE HYDROCHLORIDE,EPINEPHRINE BITARTRATE 5 ML: 10; .01 INJECTION, SOLUTION INFILTRATION; PERINEURAL at 09:15

## 2024-06-10 RX ADMIN — METHOCARBAMOL 500 MG: 500 TABLET ORAL at 15:41

## 2024-06-10 RX ADMIN — DOCUSATE SODIUM 100 MG: 100 CAPSULE, LIQUID FILLED ORAL at 05:26

## 2024-06-10 RX ADMIN — METHOCARBAMOL 1000 MG: 100 INJECTION, SOLUTION INTRAMUSCULAR; INTRAVENOUS at 05:38

## 2024-06-10 RX ADMIN — OXYCODONE HYDROCHLORIDE 10 MG: 10 TABLET ORAL at 17:53

## 2024-06-10 RX ADMIN — INSULIN GLARGINE-YFGN 15 UNITS: 100 INJECTION, SOLUTION SUBCUTANEOUS at 16:49

## 2024-06-10 RX ADMIN — OXYCODONE HYDROCHLORIDE 10 MG: 10 TABLET ORAL at 01:00

## 2024-06-10 RX ADMIN — OXYCODONE HYDROCHLORIDE 10 MG: 10 TABLET ORAL at 08:31

## 2024-06-10 RX ADMIN — SENNOSIDES AND DOCUSATE SODIUM 1 TABLET: 50; 8.6 TABLET ORAL at 20:57

## 2024-06-10 RX ADMIN — Medication 2000 UNITS: at 05:26

## 2024-06-10 RX ADMIN — OMEPRAZOLE 20 MG: 20 CAPSULE, DELAYED RELEASE ORAL at 16:46

## 2024-06-10 ASSESSMENT — PAIN DESCRIPTION - PAIN TYPE
TYPE: ACUTE PAIN;SURGICAL PAIN

## 2024-06-10 ASSESSMENT — COGNITIVE AND FUNCTIONAL STATUS - GENERAL
DRESSING REGULAR UPPER BODY CLOTHING: A LITTLE
DRESSING REGULAR LOWER BODY CLOTHING: A LOT
STANDING UP FROM CHAIR USING ARMS: A LITTLE
MOVING TO AND FROM BED TO CHAIR: A LITTLE
TURNING FROM BACK TO SIDE WHILE IN FLAT BAD: A LITTLE
PERSONAL GROOMING: A LITTLE
DAILY ACTIVITIY SCORE: 16
WALKING IN HOSPITAL ROOM: A LITTLE
MOBILITY SCORE: 16
CLIMB 3 TO 5 STEPS WITH RAILING: TOTAL
SUGGESTED CMS G CODE MODIFIER DAILY ACTIVITY: CK
SUGGESTED CMS G CODE MODIFIER MOBILITY: CK
HELP NEEDED FOR BATHING: A LOT
TOILETING: A LOT
MOVING FROM LYING ON BACK TO SITTING ON SIDE OF FLAT BED: A LITTLE

## 2024-06-10 ASSESSMENT — GAIT ASSESSMENTS
DEVIATION: BRADYKINETIC;SHUFFLED GAIT;DECREASED HEEL STRIKE
ASSISTIVE DEVICE: FRONT WHEEL WALKER
GAIT LEVEL OF ASSIST: MINIMAL ASSIST
DISTANCE (FEET): 60

## 2024-06-10 ASSESSMENT — ENCOUNTER SYMPTOMS
NECK PAIN: 1
BACK PAIN: 1
SHORTNESS OF BREATH: 0

## 2024-06-10 NOTE — PROGRESS NOTES
Hospital Medicine Daily Progress Note    Date of Service  6/10/2024    Chief Complaint  Christoph Taylor is a 59 y.o. male admitted 5/27/2024 with cauda equina syndrome and pneumonia.    Hospital Course  Kevin Taylor is a 59-year-old male with a PMHx DMT2, GERD with esophagitis, chronic back pain.  Admitted 5/27 for pneumonia and cauda equina syndrome.    CT C/A/P 5/27: Patchy bilateral infiltrates, greatest in upper right lobe.  Distal esophageal wall thickening    MRI spine showing severe spinal stenosis of cervical and lumbar spine.  S/p C4-C7 ACDF on 6/4 and L4-L5 laminectomies with stealth fusion 6/7.     Patient completed antibiotic course with ceftriaxone and doxycycline on 6/2.  PT OT consults have been placed.    Interval Problem Update  6/8: Vitals notable for intermittent tachycardia overnight.  Patient remains on 6 LPM via oxymask.  Glucose range 118-191.  POD 4 ACDF; POD 1 L4-5 laminectomy. Slight confusion and combative overnight. Currently in soft restraints, resting comfortably.     6/9: Vitals notable for improvement in tachycardia.  Remains on 2 LPM supplemental O2. POD 5 ACDF; POD 2 L4-5 laminectomy.  PT OT recommending postacute rehabilitation.  Placement is pending. Discontinue soft restraints today. Oxygen desaturations to 40-50% without supplemental O2.     6/10: Patient remains on 4LPM supplemental oxygen. WBC 7.6 from 12.2. Hb 8.7 from 9.9. POD 6 ACDF; POD 3 L4-5 laminectomy. Lower drain removed today. Neurosurgery following. SNF/Rehab pending.  Has not required soft restraints in 24 hours.     I have discussed this patient's plan of care and discharge plan at IDT rounds today with Case Management, Nursing, Nursing leadership, and other members of the IDT team.    Consultants/Specialty  neurosurgery    Code Status  Full Code    Disposition  Medically Cleared  I have placed the appropriate orders for post-discharge needs.    Review of Systems  Review of Systems   Constitutional:   Positive for malaise/fatigue.   Respiratory:  Negative for shortness of breath.    Cardiovascular:  Negative for chest pain and leg swelling.   Musculoskeletal:  Positive for back pain, joint pain and neck pain.        Physical Exam  Temp:  [36.2 °C (97.1 °F)-37 °C (98.6 °F)] 36.2 °C (97.1 °F)  Pulse:  [79-86] 79  Resp:  [16-17] 17  BP: (109-144)/(71-83) 121/71  SpO2:  [95 %-99 %] 95 %    Physical Exam  Vitals and nursing note reviewed.   Constitutional:       General: He is not in acute distress.     Appearance: Normal appearance. He is ill-appearing.      Comments: Resting comfortably   HENT:      Head:      Comments: C collar in place   Neck:      Comments: Decreased ROM through cervical spine  Cardiovascular:      Rate and Rhythm: Normal rate and regular rhythm.      Heart sounds: Murmur heard.   Abdominal:      General: Bowel sounds are normal. There is no distension.      Palpations: Abdomen is soft.   Musculoskeletal:         General: No swelling.   Skin:     General: Skin is warm and dry.   Neurological:      General: No focal deficit present.   Psychiatric:         Behavior: Behavior normal.         Fluids    Intake/Output Summary (Last 24 hours) at 6/10/2024 1220  Last data filed at 6/10/2024 0431  Gross per 24 hour   Intake --   Output 740 ml   Net -740 ml       Laboratory  Recent Labs     06/08/24  0834 06/09/24  0628 06/10/24  0758   WBC 11.5* 12.2* 7.6   RBC 3.62* 3.51* 3.13*   HEMOGLOBIN 10.0* 9.9* 8.7*   HEMATOCRIT 32.3* 31.4* 27.2*   MCV 89.2 89.5 86.9   MCH 27.6 28.2 27.8   MCHC 31.0* 31.5* 32.0*   RDW 44.0 44.2 42.5   PLATELETCT 187 188 183   MPV 8.9* 8.7* 9.2     Recent Labs     06/07/24 2049 06/08/24  0834 06/09/24  0628   SODIUM 139 137 137   POTASSIUM 4.3 4.2 4.2   CHLORIDE 97 98 97   CO2 31 31 31   GLUCOSE 191* 253* 222*   BUN 36* 30* 18   CREATININE 0.87 0.77 0.63   CALCIUM 8.5 8.3* 8.1*                   Imaging  DX-PORTABLE FLUORO > 1 HOUR   Preliminary Result      Portable fluoroscopy  utilized for 6 seconds.         INTERPRETING LOCATION: [address], [place], [address]      DX-LUMBAR SPINE-2 OR 3 VIEWS   Preliminary Result      Digitized intraoperative radiograph is submitted for review. This examination is not for diagnostic purpose but for guidance during a surgical procedure. Please see the patient's chart for full procedural details.         INTERPRETING LOCATION: [address], [place], [address]      DX-O-ARM   Preliminary Result      Portable O-arm utilized for 3 seconds.         INTERPRETING LOCATION: [address], [place], [address]      DX-CERVICAL SPINE-2 OR 3 VIEWS   Final Result      Digitized intraoperative radiograph is submitted for review. This examination is not for diagnostic purpose but for guidance during a surgical procedure. Please see the patient's chart for full procedural details.         INTERPRETING LOCATION: [address], [place], [address]      DX-PORTABLE FLUORO > 1 HOUR   Final Result      Portable fluoroscopy utilized for 8 seconds.         INTERPRETING LOCATION: [address], [place], [address]      MR-CERVICAL SPINE-W/O   Final Result      1.  Multilevel multifactorial degenerative changes   2.  High-grade central canal narrowing at C4-C5, C5-C6 and C6-C7 with findings suspicious for myelomalacia at C4-C6   3.  Other areas of central canal and neural foraminal narrowing as described above      DX-LUMBAR SPINE-BEND OR FLEX-EXT   Final Result      L1-L4 degenerative retrolisthesis without definite significant hypermobility.      DX-LUMBAR SPINE-2 OR 3 VIEWS   Final Result      1.  Multilevel degenerative changes of the lumbar spine.   2.  Mild grade 1 retrolisthesis visible with extension maneuver at L2-3 and L3-4 which could indicate hypermobility.      MR-THORACIC SPINE-WITH & W/O   Final Result         Remote endplate compression fractures at T1, T2, T12 without significant posterior cortical retropulsion or canal stenosis.      Mild degenerative changes of the thoracic spine  without significant canal stenosis or foraminal narrowing.         MR-LUMBAR SPINE-WITH & W/O   Final Result         Severe canal stenosis at L4-5 with cauda equina compression. There is also severe right foraminal narrowing.      Postoperative changes at L5-S1 with a well decompressed canal.      Remote compression fracture involving the superior endplate of T12 with 50% loss of height.      DX-FOOT-2- LEFT   Final Result      No acute osseous abnormality.      Prominent degenerative changes within the mid and hindfoot with pes planus type deformity.      CT-LSPINE W/O PLUS RECONS   Final Result         1.  No acute traumatic bony injury of the lumbar spine.      CT-TSPINE W/O PLUS RECONS   Final Result         1.  No acute traumatic bony injury of the thoracic spine.      CT-CHEST,ABDOMEN,PELVIS WITH   Final Result           Assessment/Plan  * Cervical stenosis of spinal canal  Assessment & Plan  S/p C4-C7 ACDF on 6/4  -Continue pain management as requested  - Neurosurgery following  - PT OT recommending PAR; placement pending  - Okay to ice incision sites    Chronic renal insufficiency  Assessment & Plan  Creatinine 0.63; stable   Tolerating PO intake     Cauda equina compression (HCC)  Assessment & Plan  L4-L5 laminectomies with stealth fusion 6/7  - Continue pain management as requested; no IV pain medications in last 24 hours   - PT OT recommending PAR; placement pending; PM&R consult pending  - Okay to ice incision sites  - Neurosurgery is following closely  - Okay to raise head of bed; lie flat if postural headache develops  - Cleared by neurosurgery to resume DVT prophylaxis  - Drain removed 6/10 by neurosurgery    Fall  Assessment & Plan  Patient did not lose consciousness or hit his head  Complains of neck pain, back pain and hip pain  PT OT recommending PAR    Pneumonia due to infectious organism- (present on admission)  Assessment & Plan  CT C/A/P 5/27: Patchy bilateral infiltrates, greatest in upper  right lobe.  Follow-up blood cultures and sputum cultures neg  Completed C3 + doxycycline course on 6/2    Acute respiratory failure with hypoxia (HCC)- (present on admission)  Assessment & Plan  Desaturations 40-50% without oxy mask in place  Continue supplemental o2; currently 4LPM  - Continue to wean oxygen as able  - Continuous pulse ox monitoring     Acute on chronic anemia- (present on admission)  Assessment & Plan  Baseline Hb 10-13 over the last month   - Hb today 8.7; restarted DVT prophylaxis on 6/9  - History of gastritis   - Continue Protonix   - Repeat CBC in AM     Thoracic back pain- (present on admission)  Assessment & Plan  Critical spinal stenosis of both C and L spines  - Continue supportive care    Diabetes mellitus type 2, insulin dependent, poorly controlled with marked hyperglycemia (HCC)- (present on admission)  Assessment & Plan  A1C 12.4  Glucose ranging 121-236 over last 24 hours  - Continue Lantus 15 units BID  - Continue sliding scale insulin; 5 units over last 24 hours  - Monitor BG trend  - Accu-Cheks before meals and at bedtime.   - Goal to keep BG between 140-180 per 2019 ADA guidelines       Acute esophagitis- (present on admission)  Assessment & Plan  Continue Protonix    Agitation  Assessment & Plan  Post operative agitation; has resolved  Required soft restraints; discontinued 6/9  - Allow for sleep protection         VTE prophylaxis:    enoxaparin ppx      I have performed a physical exam and reviewed and updated ROS and Plan today (6/10/2024). In review of yesterday's note (6/9/2024), there are no changes except as documented above.

## 2024-06-10 NOTE — CARE PLAN
Problem: Pain - Standard  Goal: Alleviation of pain or a reduction in pain to the patient’s comfort goal  Outcome: Progressing     Problem: Knowledge Deficit - Standard  Goal: Patient and family/care givers will demonstrate understanding of plan of care, disease process/condition, diagnostic tests and medications  Outcome: Progressing     Problem: Provide Safe Environment  Goal: Suicide environmental safety, protocols, policies, and practices will be implemented  Outcome: Progressing     Problem: Fall Risk  Goal: Patient will remain free from falls  Outcome: Progressing     Problem: Skin Integrity  Goal: Skin integrity is maintained or improved  Outcome: Progressing    The patient is Stable - Low risk of patient condition declining or worsening    Shift Goals  Clinical Goals: Pain management, safety  Patient Goals: Pain control, comfort  Family Goals: Not present    Progress made toward(s) clinical / shift goals: Reduction of pain, placed bed in a lowest possible position, placed bed side table and call bell within reach, side rails up x2. No new skin tear/ breakdown of the skin found. Kept safe and comfortably,

## 2024-06-10 NOTE — DISCHARGE PLANNING
Case Management Discharge Planning    Admission Date: 5/27/2024  GMLOS: 7.1  ALOS: 14    6-Clicks ADL Score: 15  6-Clicks Mobility Score: 10  PT and/or OT Eval ordered: Yes  Post-acute Referrals Ordered: Yes  Post-acute Choice Obtained: Yes  Has referral(s) been sent to post-acute provider:  Yes      Anticipated Discharge Dispo: Discharge Disposition: D/T to SNF with Medicare cert in anticipation of skilled care (03)    DME Needed: No    Action(s) Taken: Updated Provider/Nurse on Discharge Plan    Escalations Completed: Long Length of Stay Committee     Medically Clear: No    Next Steps: follow up with DPA     Barriers to Discharge: Medical clearance and Pending Placement    Is the patient up for discharge tomorrow: No  Patient was sitting in chair by bed, tired from therapy.  Patient's goal is to go home, feels the surgery's he has had will make a big difference for him.  Patient stated he has been to skilled before, believes [facility] was the last one.      SW stressed importance of working with therapies on a consistent basis. Patient acknowledged saying he knows he must if he is going to get stronger.    Patient lives alone in a two bedroom mobile home. Has several neighbors that help him out when needed, one of them is currently taking care of his dog.   Patient's has a sister in [facility] and [place] in [place].     MYRON faxed choice to [facility], [facility] facilities

## 2024-06-10 NOTE — THERAPY
Occupational Therapy  Daily Treatment     Patient Name: Christoph Taylor  Age:  59 y.o., Sex:  male  Medical Record #: [id number]  Today's Date: 6/10/2024    Precautions: Fall Risk, Spinal / Back Precautions , Cervical Collar    Comments: L AFO in room, c-collar on AAT except showers and meals    Assessment    Pt seen for OT tx. Pt now s/p redo L4-5 laminectomy with stealth fusion (6/7). Pt demo poor carryover of previously learned spinal precautions necessitating re-education; new handouts were provided. Pt reported mild dizziness with transitional movements; provided education on taking static rest breaks between transitions to allow body to adjust. Pt reported that symptoms began to resolve with time. Pt required min-max A to complete ADLs, functional mobility, and txfs using FWW. Pt demo poor safety awareness during functional mobility and txfs requiring increased multimodal cues. Pt was handed off to PT for further OOB activity. Continue to recommend post-acute placement prior to DC home. Will continue to follow for ongoing acute OT services.     Plan    Treatment Plan Status: Continue Current Treatment Plan  Type of Treatment: Self Care / Activities of Daily Living, Adaptive Equipment, Neuro Re-Education / Balance, Therapeutic Exercises, Therapeutic Activity, Orthotics Treatment  Treatment Frequency: 4 Times per Week  Treatment Duration: Until Therapy Goals Met    DC Equipment Recommendations: Unable to determine at this time  Discharge Recommendations: Recommend post-acute placement for additional occupational therapy services prior to discharge home     Objective      Vitals   O2 (LPM) 4   O2 Delivery Device Nasal Cannula   Pain 0 - 10 Group   Therapist Pain Assessment Post Activity Pain Same as Prior to Activity;Nurse Notified  (Not rated, reported an increase in generalized pain with activity)   Cognition    Cognition / Consciousness X   Level of Consciousness Alert   Safety Awareness Impaired;Impulsive    Attention Impaired   Sequencing Impaired   Comments Pleasant and cooperative. Demo poor carryover from previous sessions regarding spinal precautions necessitating re-education.   Strength Upper Body   Gross Strength Generalized Weakness, Equal Bilaterally.    Balance   Sitting Balance (Static) Fair   Sitting Balance (Dynamic) Fair -   Standing Balance (Static) Poor +   Standing Balance (Dynamic) Poor   Weight Shift Sitting Fair   Weight Shift Standing Poor   Skilled Intervention Verbal Cuing;Tactile Cuing;Compensatory Strategies   Comments w/FWW   Bed Mobility    Supine to Sit Contact Guard Assist   Sit to Supine   (Up with PT post)   Scooting Contact Guard Assist   Rolling Contact Guard Assist   Skilled Intervention Verbal Cuing;Tactile Cuing;Sequencing;Compensatory Strategies   Comments HOB flat, cues for log roll, use of  rails   Activities of Daily Living   Eating Minimal Assist  (Required assist opening packages; provided pt with new red built up  to improve independence with self-feeding ADLs)   Grooming Minimal Assist;Standing   Upper Body Dressing Maximal Assist  (Managing c-collar)   Lower Body Dressing Minimal Assist  (Demo ability to tailor sit; required assist with balance. Politely declined use of L AFO)   Toileting Maximal Assist  (Required assist with pericare after completing BM on standard toilet)   Skilled Intervention Verbal Cuing;Tactile Cuing;Sequencing;Compensatory Strategies   How much help from another person does the patient currently need...   6 Clicks Daily Activity Score 16   Functional Mobility   Sit to Stand Minimal Assist   Bed, Chair, Wheelchair Transfer Minimal Assist   Toilet Transfers Moderate Assist  (Use of grab bar; edu on completing txfs as he was attempting to complete prematurely)   Mobility EOB > bathroom > handoff to PT in room for furhter OOB activity; w/FWW   Skilled Intervention Verbal Cuing;Tactile Cuing;Sequencing;Facilitation;Compensatory Strategies   Activity  Tolerance   Sitting in Chair 5 min  (Toilet)   Sitting Edge of Bed 5 min   Standing 6 min   Patient / Family Goals   Patient / Family Goal #1 To have less pain   Goal #1 Outcome Progressing as expected   Short Term Goals   Short Term Goal # 1 Pt will transfer to toilet with SBA and use of AE PRN   Goal Outcome # 1 Progressing as expected   Short Term Goal # 2 Pt will perform toileting with SBA and use of AE PRN   Goal Outcome # 2 Progressing as expected   Short Term Goal # 3 Pt will manage c-collar doffing/donning and changing pads with Keysha   Goal Outcome # 3 Progressing as expected   Short Term Goal # 4 Pt will perform LBD with Keysha and good adherence to spinal precautions, use of AE PRN   Goal Outcome # 4 Progressing as expected   Education Group   Education Provided Spinal Precautions;Brace Wear and Care;Transfers;Role of Occupational Therapist;Activities of Daily Living;Pathology of bedrest   Role of Occupational Therapist Patient Response Patient;Acceptance;Explanation;Verbal Demonstration   Spinal Precautions Patient Response Patient;Acceptance;Explanation;Demonstration;Handout;Verbal Demonstration;Action Demonstration;Reinforcement Needed   Brace Wear & Care Patient Response Patient;Acceptance;Explanation;Reinforcement Needed   Transfers Patient Response Patient;Acceptance;Explanation;Reinforcement Needed   ADL Patient Response Patient;Acceptance;Explanation;Verbal Demonstration;Action Demonstration;Reinforcement Needed   Pathology of Bedrest Patient Response Patient;Acceptance;Explanation;Verbal Demonstration;Action Demonstration

## 2024-06-10 NOTE — PROGRESS NOTES
Neurosurgery Progress Note    Subjective:  57 yo man presented with a fall about one week ago with resultant increased right foot weakness. MRI shows critical L4-5 spondylosis and stenosis. Has a history of a prior L5-S1 onlay fusion 30 years ago.     Reporting back pain and right greater than left lower extremity radiculopathy.   Right foot weakness requiring AFO brace. Has bene having loss of bowels for several months.    Lumbar x-rays 6/ show multilevel spondylosis with grade 1 retrolisthesis of L2 on L3 and L3 on L4. No dynamic instability.  Cervical MRI 6/ shows multilevel severe central canal stenosis C4-7 with possible cord signal change C4-6.    POD#6 C4-7 ACDF.  Doing fine, denies significant UE pain, neck pain as expected.  Swallowing ok, but with some discomfort.    POD#3 redo L4/5 laminectomy with PSF.  Back pain improved, feeling much better today.   Had durotomy intra-op, denies postural headaches.   Minimal ambulation.   Pending rehab.       Exam:  Bilateral interossei wasting, motor 3+/5 bilateral triceps, 4-/5 bilateral interossei.  Motor right EHL/AT 4/5, limited due to flat foot on the left with 4/5, othwerise 5/5 in bilateral lower extremities.   Sensation intact except distally in the lower extremities  Anterior cervical dressing c/d/I.  Aspen cervical collar in place.  Voice fine.   Lumbar incision CDI with no active drainage.     Drain output becoming more clear.      BP  Min: 109/71  Max: 144/83  Pulse  Av.3  Min: 79  Max: 86  Resp  Av.5  Min: 16  Max: 17  Temp  Av.5 °C (97.7 °F)  Min: 36.2 °C (97.1 °F)  Max: 37 °C (98.6 °F)  SpO2  Av.8 %  Min: 95 %  Max: 99 %    No data recorded    Recent Labs     24  0834 24  0628 06/10/24  0758   WBC 11.5* 12.2* 7.6   RBC 3.62* 3.51* 3.13*   HEMOGLOBIN 10.0* 9.9* 8.7*   HEMATOCRIT 32.3* 31.4* 27.2*   MCV 89.2 89.5 86.9   MCH 27.6 28.2 27.8   MCHC 31.0* 31.5* 32.0*   RDW 44.0 44.2 42.5   PLATELETCT 187 188 183   MPV 8.9*  8.7* 9.2       Recent Labs     06/07/24 2049 06/08/24  0834 06/09/24  0628   SODIUM 139 137 137   POTASSIUM 4.3 4.2 4.2   CHLORIDE 97 98 97   CO2 31 31 31   GLUCOSE 191* 253* 222*   BUN 36* 30* 18   CREATININE 0.87 0.77 0.63   CALCIUM 8.5 8.3* 8.1*                   Intake/Output                         06/09/24 0700 - 06/10/24 0659 06/10/24 0700 - 06/11/24 0659     0700-1859 1900-0659 Total 0700-1859 1900-0659 Total                 Intake    Total Intake -- -- -- -- -- --       Output    Urine  --  600 600  --  -- --    Number of Times Voided 1 x -- 1 x -- -- --    Urine Void (mL) -- 600 600 -- -- --    Drains  135  140 275  --  -- --    Output (mL) (Closed/Suction Drain 1 Midline Back Alexandru Michelle) 135 140 275 -- -- --    Stool  --  -- --  --  -- --    Number of Times Stooled -- 1 x 1 x -- -- --    Total Output 135 740 875 -- -- --       Net I/O     -135 -740 -875 -- -- --              Intake/Output Summary (Last 24 hours) at 6/10/2024 0957  Last data filed at 6/10/2024 0431  Gross per 24 hour   Intake --   Output 775 ml   Net -775 ml             enoxaparin (LOVENOX) injection  40 mg DAILY AT 1800    methocarbamol  500 mg Q8HRS PRN    Nozin nasal  swab  1 Applicator BID    vitamin D3  2,000 Units DAILY    insulin GLARGINE  15 Units BID    dextrose  250 mL Q15 MIN PRN    Pharmacy Consult Request  1 Each PHARMACY TO DOSE    MD ALERT...DO NOT ADMINISTER NSAIDS or ASPIRIN unless ORDERED By Neurosurgery  1 Each PRN    docusate sodium  100 mg BID    senna-docusate  1 Tablet Nightly    senna-docusate  1 Tablet Q24HRS PRN    polyethylene glycol/lytes  1 Packet BID PRN    magnesium hydroxide  30 mL QDAY PRN    bisacodyl  10 mg Q24HRS PRN    sodium phosphate  1 Each Once PRN    diphenhydrAMINE  25 mg Q6HRS PRN    Or    diphenhydrAMINE  25 mg Q6HRS PRN    ondansetron  4 mg Q4HRS PRN    ondansetron  4 mg Q4HRS PRN    promethazine  12.5-25 mg Q4HRS PRN    promethazine  12.5-25 mg Q4HRS PRN    prochlorperazine  5-10  mg Q4HRS PRN    oxyCODONE immediate-release  5 mg Q3HRS PRN    Or    oxyCODONE immediate-release  10 mg Q3HRS PRN    Or    HYDROmorphone  0.5 mg Q3HRS PRN    lidocaine  2 Patch Q24HR    venlafaxine XR  75 mg QDAY with Breakfast    cyanocobalamin  1,000 mcg DAILY    melatonin  5 mg HS PRN    acetaminophen  650 mg Q6HRS PRN    insulin regular  2-9 Units 4X/DAY ACHS    metoclopramide  10 mg TID PRN    omeprazole  20 mg BID       Assessment and Plan:  Hospital day # 15  POD#6 C4-7 ACDF.  POD#3 redo L5 and L4 laminectomy with PSF.  PT/OT, encourage up to chair.  Ice incisions.   Rehab pending.  Drain removed, single suture placed.   Appreciate hospitalist care.   Following    Chemical prophylactic DVT therapy: Yes - Lovenox 40mg/qd    Start date/time: today     Brain Compression: No

## 2024-06-10 NOTE — CARE PLAN
The patient is Watcher - Medium risk of patient condition declining or worsening    Shift Goals  Clinical Goals: pain control, mobilize  Patient Goals: pain control  Family Goals: Not present    Progress made toward(s) clinical / shift goals:    Problem: Pain - Standard  Goal: Alleviation of pain or a reduction in pain to the patient’s comfort goal  Outcome: Met   Oxy given PRN with +results. Educated pt on importance of pain control- pt verbalized understanding.   Problem: Knowledge Deficit - Standard  Goal: Patient and family/care givers will demonstrate understanding of plan of care, disease process/condition, diagnostic tests and medications  Outcome: Met  POC discussed-pt verbalized understanding.     Patient is not progressing towards the following goals:

## 2024-06-10 NOTE — ASSESSMENT & PLAN NOTE
Baseline Hb 10-13 over the last month   - Hb today 8.7; restarted DVT prophylaxis on 6/9  - History of gastritis   - Continue Protonix   - Repeat CBC in AM

## 2024-06-10 NOTE — DISCHARGE PLANNING
Medical Social Work  SW attempted to speak to patient about his discharge plan.  Patient was working with OT at time, was sitting at the side of the bed.

## 2024-06-10 NOTE — THERAPY
"Physical Therapy   Daily Treatment     Patient Name: Christoph Taylor  Age:  59 y.o., Sex:  male  Medical Record #: [id number]  Today's Date: 6/10/2024     Precautions  Precautions: Fall Risk;Spinal / Back Precautions ;Cervical Collar    Comments: L AFO in room, c-collar on AAT except showers and meals    Assessment    Pt received up following OT session and agreeable to PT session. Pt was able to progress mobility to ambulate a short distance at a slow pace and declined putting his AFO this session. Pt required min A for transfer mostly due to impaired safety awareness in not turning fully prior to sitting. Pt was limited by pain and fatigue. Continue to recommend post-acute placement for further rehab. Will follow for acute PT.    Plan    Treatment Plan Status: Continue Current Treatment Plan  Type of Treatment: Equipment, Gait Training, Stair Training, Therapeutic Activities  Treatment Frequency: 4 Times per Week  Treatment Duration: Until Therapy Goals Met    DC Equipment Recommendations: Unable to determine at this time  Discharge Recommendations: Recommend post-acute placement for additional physical therapy services prior to discharge home    Subjective    \"If I'm just walking a little bit without shoes, I don't need the AFO\"     Objective       06/10/24 1144   Precautions   Precautions Fall Risk;Spinal / Back Precautions ;Cervical Collar     Comments L AFO in room, c-collar on AAT except showers and meals   Vitals   O2 (LPM) 4   O2 Delivery Device Nasal Cannula   Vitals Comments maintained on 4L with ambulation   Pain 0 - 10 Group   Therapist Pain Assessment Nurse Notified;Post Activity Pain Same as Prior to Activity  (reported generalized pain, not rated)   Cognition    Level of Consciousness Alert   Safety Awareness Impaired;Impulsive   Attention Impaired   Comments Pleasant and cooperative. Moves quickly at times, requires cues for safety. Decided to sit down in the chair before he had really turned to " sit safely   Balance   Sitting Balance (Static) Fair   Sitting Balance (Dynamic) Fair -   Standing Balance (Static) Fair -   Standing Balance (Dynamic) Poor +   Weight Shift Sitting Fair   Weight Shift Standing Fair   Skilled Intervention Verbal Cuing;Compensatory Strategies   Comments with FWW   Bed Mobility    Comments up with OT prior, up to chair post   Gait Analysis   Gait Level Of Assist Minimal Assist   Assistive Device Front Wheel Walker   Distance (Feet) 60   # of Times Distance was Traveled 1   Deviation Bradykinetic;Shuffled Gait;Decreased Heel Strike   Skilled Intervention Verbal Cuing;Compensatory Strategies   Comments Cues for proximity to the FWW. Had his AFO in the room, declined to use for short distance   Functional Mobility   Sit to Stand Minimal Assist   Bed, Chair, Wheelchair Transfer Minimal Assist   Mobility up with FWW   Skilled Intervention Verbal Cuing   Comments Cues for safety with transfers as pt returned to sitting while facing 90 deg from the chair, min A to safely sit   6 Clicks Assessment - How much HELP from from another person do you currently need... (If the patient hasn't done an activity recently, how much help from another person do you think he/she would need if he/she tried?)   Turning from your back to your side while in a flat bed without using bedrails? 3   Moving from lying on your back to sitting on the side of a flat bed without using bedrails? 3   Moving to and from a bed to a chair (including a wheelchair)? 3   Standing up from a chair using your arms (e.g., wheelchair, or bedside chair)? 3   Walking in hospital room? 3   Climbing 3-5 steps with a railing? 1   6 clicks Mobility Score 16   Short Term Goals    Short Term Goal # 1 Patient will perform sit-stand with LRAD with supervision in 6 visits to progress with mobility   Goal Outcome # 1 goal not met   Short Term Goal # 2 Patient will perform chair transfers with LRAD with supervision in 6 visits to safely get OOB  to chair   Goal Outcome # 2 Goal not met   Short Term Goal # 3 Patient will ambulate 100 feet with LRAD & AFO with supervision in 6 visits to safely ambulate household distance   Goal Outcome # 3 Goal not met   Short Term Goal # 4 Patient will negotiate 6 steps with LRAD with supervision in 6 visits to safely get in & out of his house   Goal Outcome # 4 Goal not met   Short Term Goal # 5 pt will move supine<>eob with spv in 6 tx for bed mobility.   Goal Outcome # 5 Goal not met   Physical Therapy Treatment Plan   Physical Therapy Treatment Plan Continue Current Treatment Plan   Anticipated Discharge Equipment and Recommendations   DC Equipment Recommendations Unable to determine at this time   Discharge Recommendations Recommend post-acute placement for additional physical therapy services prior to discharge home   Interdisciplinary Plan of Care Collaboration   IDT Collaboration with  Nursing;Occupational Therapist   Patient Position at End of Therapy Seated;Chair Alarm On;Call Light within Reach;Tray Table within Reach;Phone within Reach   Collaboration Comments RN updated   Session Information   Date / Session Number  6/10-3 (2/4, 6/10) att 6/4, 6/6

## 2024-06-11 VITALS
TEMPERATURE: 98.2 F | SYSTOLIC BLOOD PRESSURE: 135 MMHG | HEIGHT: 72 IN | HEART RATE: 99 BPM | RESPIRATION RATE: 16 BRPM | WEIGHT: 162.04 LBS | OXYGEN SATURATION: 92 % | BODY MASS INDEX: 21.95 KG/M2 | DIASTOLIC BLOOD PRESSURE: 83 MMHG

## 2024-06-11 LAB
ERYTHROCYTE [DISTWIDTH] IN BLOOD BY AUTOMATED COUNT: 43.1 FL (ref 35.9–50)
GLUCOSE BLD STRIP.AUTO-MCNC: 144 MG/DL (ref 65–99)
GLUCOSE BLD STRIP.AUTO-MCNC: 195 MG/DL (ref 65–99)
HCT VFR BLD AUTO: 27.7 % (ref 42–52)
HGB BLD-MCNC: 8.7 G/DL (ref 14–18)
MCH RBC QN AUTO: 27.6 PG (ref 27–33)
MCHC RBC AUTO-ENTMCNC: 31.4 G/DL (ref 32.3–36.5)
MCV RBC AUTO: 87.9 FL (ref 81.4–97.8)
PLATELET # BLD AUTO: 189 K/UL (ref 164–446)
PMV BLD AUTO: 8.9 FL (ref 9–12.9)
RBC # BLD AUTO: 3.15 M/UL (ref 4.7–6.1)
WBC # BLD AUTO: 7.8 K/UL (ref 4.8–10.8)

## 2024-06-11 PROCEDURE — A9270 NON-COVERED ITEM OR SERVICE: HCPCS | Performed by: INTERNAL MEDICINE

## 2024-06-11 PROCEDURE — 700101 HCHG RX REV CODE 250

## 2024-06-11 PROCEDURE — 700102 HCHG RX REV CODE 250 W/ 637 OVERRIDE(OP): Performed by: INTERNAL MEDICINE

## 2024-06-11 PROCEDURE — A9270 NON-COVERED ITEM OR SERVICE: HCPCS

## 2024-06-11 PROCEDURE — A9270 NON-COVERED ITEM OR SERVICE: HCPCS | Performed by: STUDENT IN AN ORGANIZED HEALTH CARE EDUCATION/TRAINING PROGRAM

## 2024-06-11 PROCEDURE — 700102 HCHG RX REV CODE 250 W/ 637 OVERRIDE(OP)

## 2024-06-11 PROCEDURE — 700102 HCHG RX REV CODE 250 W/ 637 OVERRIDE(OP): Performed by: STUDENT IN AN ORGANIZED HEALTH CARE EDUCATION/TRAINING PROGRAM

## 2024-06-11 PROCEDURE — 82962 GLUCOSE BLOOD TEST: CPT | Mod: 91

## 2024-06-11 PROCEDURE — A9270 NON-COVERED ITEM OR SERVICE: HCPCS | Performed by: HOSPITALIST

## 2024-06-11 PROCEDURE — 85027 COMPLETE CBC AUTOMATED: CPT

## 2024-06-11 PROCEDURE — 97530 THERAPEUTIC ACTIVITIES: CPT

## 2024-06-11 PROCEDURE — 36415 COLL VENOUS BLD VENIPUNCTURE: CPT

## 2024-06-11 PROCEDURE — 99239 HOSP IP/OBS DSCHRG MGMT >30: CPT | Performed by: INTERNAL MEDICINE

## 2024-06-11 PROCEDURE — 700102 HCHG RX REV CODE 250 W/ 637 OVERRIDE(OP): Performed by: HOSPITALIST

## 2024-06-11 RX ORDER — UREA 10 %
5 LOTION (ML) TOPICAL NIGHTLY PRN
Status: SHIPPED
Start: 2024-06-11

## 2024-06-11 RX ORDER — ACETAMINOPHEN 325 MG/1
650 TABLET ORAL EVERY 6 HOURS PRN
Status: ON HOLD
Start: 2024-06-11 | End: 2024-06-18

## 2024-06-11 RX ORDER — ENOXAPARIN SODIUM 100 MG/ML
40 INJECTION SUBCUTANEOUS DAILY
Status: ACTIVE | DISCHARGE
Start: 2024-06-11 | End: 2024-06-12

## 2024-06-11 RX ORDER — OMEPRAZOLE 20 MG/1
20 CAPSULE, DELAYED RELEASE ORAL 2 TIMES DAILY
Status: ON HOLD
Start: 2024-06-11 | End: 2024-06-18

## 2024-06-11 RX ORDER — ONDANSETRON 4 MG/1
4 TABLET, ORALLY DISINTEGRATING ORAL EVERY 4 HOURS PRN
Qty: 10 TABLET | Refills: 0 | Status: SHIPPED | OUTPATIENT
Start: 2024-06-11

## 2024-06-11 RX ORDER — AMOXICILLIN 250 MG
1 CAPSULE ORAL NIGHTLY
Qty: 30 TABLET | Refills: 0 | Status: SHIPPED | OUTPATIENT
Start: 2024-06-11

## 2024-06-11 RX ORDER — OXYCODONE HYDROCHLORIDE 10 MG/1
10 TABLET ORAL
Qty: 24 TABLET | Refills: 0 | Status: ON HOLD | OUTPATIENT
Start: 2024-06-11 | End: 2024-06-18

## 2024-06-11 RX ORDER — BISACODYL 10 MG
10 SUPPOSITORY, RECTAL RECTAL
Status: SHIPPED
Start: 2024-06-11 | End: 2024-06-12

## 2024-06-11 RX ORDER — OXYCODONE HYDROCHLORIDE 5 MG/1
5 TABLET ORAL
Qty: 30 TABLET | Refills: 0 | Status: ON HOLD | OUTPATIENT
Start: 2024-06-11 | End: 2024-06-18

## 2024-06-11 RX ORDER — DIPHENHYDRAMINE HCL 25 MG
25 TABLET ORAL EVERY 6 HOURS PRN
Qty: 30 TABLET | Refills: 0 | Status: SHIPPED | OUTPATIENT
Start: 2024-06-11

## 2024-06-11 RX ORDER — VENLAFAXINE HYDROCHLORIDE 75 MG/1
75 CAPSULE, EXTENDED RELEASE ORAL
Qty: 30 CAPSULE | Refills: 3 | Status: SHIPPED | OUTPATIENT
Start: 2024-06-12

## 2024-06-11 RX ORDER — PSEUDOEPHEDRINE HCL 30 MG
100 TABLET ORAL 2 TIMES DAILY
Status: SHIPPED
Start: 2024-06-11

## 2024-06-11 RX ORDER — METHOCARBAMOL 500 MG/1
500 TABLET, FILM COATED ORAL EVERY 8 HOURS PRN
Status: SHIPPED
Start: 2024-06-11

## 2024-06-11 RX ORDER — LIDOCAINE 4 G/G
2 PATCH TOPICAL EVERY 24 HOURS
Status: ON HOLD
Start: 2024-06-12 | End: 2024-06-18

## 2024-06-11 RX ADMIN — CYANOCOBALAMIN TAB 500 MCG 1000 MCG: 500 TAB at 05:37

## 2024-06-11 RX ADMIN — OXYCODONE HYDROCHLORIDE 5 MG: 5 TABLET ORAL at 08:39

## 2024-06-11 RX ADMIN — METHOCARBAMOL 500 MG: 500 TABLET ORAL at 14:47

## 2024-06-11 RX ADMIN — OMEPRAZOLE 20 MG: 20 CAPSULE, DELAYED RELEASE ORAL at 05:37

## 2024-06-11 RX ADMIN — INSULIN GLARGINE-YFGN 15 UNITS: 100 INJECTION, SOLUTION SUBCUTANEOUS at 08:35

## 2024-06-11 RX ADMIN — DOCUSATE SODIUM 100 MG: 100 CAPSULE, LIQUID FILLED ORAL at 05:37

## 2024-06-11 RX ADMIN — OXYCODONE HYDROCHLORIDE 10 MG: 10 TABLET ORAL at 12:49

## 2024-06-11 RX ADMIN — OXYCODONE HYDROCHLORIDE 10 MG: 10 TABLET ORAL at 00:07

## 2024-06-11 RX ADMIN — INSULIN HUMAN 2 UNITS: 100 INJECTION, SOLUTION PARENTERAL at 14:06

## 2024-06-11 RX ADMIN — Medication 2000 UNITS: at 05:37

## 2024-06-11 RX ADMIN — VENLAFAXINE HYDROCHLORIDE 75 MG: 75 CAPSULE, EXTENDED RELEASE ORAL at 08:19

## 2024-06-11 RX ADMIN — LIDOCAINE 2 PATCH: 4 PATCH TOPICAL at 02:24

## 2024-06-11 ASSESSMENT — COGNITIVE AND FUNCTIONAL STATUS - GENERAL
WALKING IN HOSPITAL ROOM: A LITTLE
MOVING FROM LYING ON BACK TO SITTING ON SIDE OF FLAT BED: A LITTLE
SUGGESTED CMS G CODE MODIFIER MOBILITY: CK
CLIMB 3 TO 5 STEPS WITH RAILING: A LOT
STANDING UP FROM CHAIR USING ARMS: A LITTLE
TURNING FROM BACK TO SIDE WHILE IN FLAT BAD: A LITTLE
MOBILITY SCORE: 17
MOVING TO AND FROM BED TO CHAIR: A LITTLE

## 2024-06-11 ASSESSMENT — PAIN DESCRIPTION - PAIN TYPE
TYPE: ACUTE PAIN;SURGICAL PAIN

## 2024-06-11 ASSESSMENT — GAIT ASSESSMENTS
DEVIATION: BRADYKINETIC;SHUFFLED GAIT;DECREASED HEEL STRIKE
ASSISTIVE DEVICE: FRONT WHEEL WALKER
DISTANCE (FEET): 50
GAIT LEVEL OF ASSIST: MINIMAL ASSIST

## 2024-06-11 NOTE — DISCHARGE INSTRUCTIONS
Diet  Diabetic Diet     A Diabetic Diet can help you control your blood glucose, lower your risk of heart disease, improve your blood pressure and maintain a healthy weight.  Eating healthy foods, low in calories, fat and carbohydrates at the same time every day can help control your blood glucose. Carbohydrates can greatly affect your blood glucose levels.  Counting carbs is important to keep your blood glucose at a healthy level, especially if you use insulin or take certain oral diabetes medicines.  Avoid alcohol as it can cause a sudden decrease in blood glucose (hypoglycemia), especially if you use insulin or take certain oral diabetes medicines.

## 2024-06-11 NOTE — DISCHARGE PLANNING
Medical Social Work  SW informed patient he has been accepted by [facility] and they can take him today.  Patient stated he is ready to go, wants to get stronger and go home

## 2024-06-11 NOTE — DISCHARGE PLANNING
1453: COBRA signed by patient and physician. Discharge packet complete with face sheet x2, discharge summary, 2 hours of chart notes and hard scripts for narcotics.   Completed discharge packet and COBRA given to bedside RN by writer.

## 2024-06-11 NOTE — DISCHARGE PLANNING
2936  Agency/Facility Name: [facility]/ [place]  Sent Referral per Choice Form at: 0849 am    JESSIE Abarca referral to [phone]

## 2024-06-11 NOTE — DISCHARGE PLANNING
Medical Social Work  Patient has been accepted by [facility]  Patient is medically clear to discharge, pending insurance authorization .

## 2024-06-11 NOTE — DISCHARGE PLANNING
Case Management Discharge Planning    Admission Date: 5/27/2024  GMLOS: 7.3  ALOS: 15    6-Clicks ADL Score: 16  6-Clicks Mobility Score: 17  PT and/or OT Eval ordered: Yes  Post-acute Referrals Ordered: Yes  Post-acute Choice Obtained: Yes  Has referral(s) been sent to post-acute provider:  Yes      Anticipated Discharge Dispo: Discharge Disposition: D/T to SNF with Medicare cert in anticipation of skilled care (03)    DME Needed: No    Action(s) Taken: Updated Provider/Nurse on Discharge Plan  Pt was discussed in IDT roounds with Dr Chatterjee.  Pt is medically cleared for SNF.    Per DPA Franchesca Pt has been accepted with [facility]  and insurance auth is complete.   Informed Dr Chatterjee, RN Rachel and Wesley SANCHES.  Transport is thru [facility] van.    MYRON Olson prepared [facility]/transfer packet.    Pt has PASRR  This RN CM informed Nickolas Pt s Son of this discharge update via phone.      Escalations Completed: None    Medically Clear: Yes    Next Steps:   CM to continue to assist Pt with discharge as needed    Barriers to Discharge:   None      Is the patient up for discharge tomorrow: No

## 2024-06-11 NOTE — PROGRESS NOTES
Neurosurgery Progress Note    Subjective:  57 yo man presented with a fall about one week ago with resultant increased right foot weakness. MRI shows critical L4-5 spondylosis and stenosis. Has a history of a prior L5-S1 onlay fusion 30 years ago.     Reporting back pain and right greater than left lower extremity radiculopathy.   Right foot weakness requiring AFO brace. Has bene having loss of bowels for several months.    Lumbar x-rays 6/ show multilevel spondylosis with grade 1 retrolisthesis of L2 on L3 and L3 on L4. No dynamic instability.  Cervical MRI 6/1 shows multilevel severe central canal stenosis C4-7 with possible cord signal change C4-6.    POD#7 C4-7 ACDF.  Doing fine, denies significant UE pain, neck pain as expected.  Swallowing ok, but with some discomfort.    POD#4 redo L4/5 laminectomy with PSF.  Back pain improved, feeling much better today.   Had durotomy intra-op, denies postural headaches.   Minimal ambulation.   Pending rehab.       Exam:  Bilateral interossei wasting, motor 3+/5 bilateral triceps, 4-/5 bilateral interossei.  Motor right EHL/AT 4/5, limited due to flat foot on the left with 4/5, othwerise 5/5 in bilateral lower extremities.   Sensation intact except distally in the lower extremities  Anterior cervical dressing c/d/I.  Aspen cervical collar in place.  Voice fine.   Lumbar incision CDI with no active drainage.     Drain output becoming more clear.      BP  Min: 109/63  Max: 135/83  Pulse  Av.8  Min: 82  Max: 108  Resp  Av.8  Min: 16  Max: 18  Temp  Av.5 °C (97.7 °F)  Min: 36.2 °C (97.1 °F)  Max: 36.8 °C (98.2 °F)  SpO2  Av.5 %  Min: 92 %  Max: 96 %    No data recorded    Recent Labs     24  0628 06/10/24  0758 24  0740   WBC 12.2* 7.6 7.8   RBC 3.51* 3.13* 3.15*   HEMOGLOBIN 9.9* 8.7* 8.7*   HEMATOCRIT 31.4* 27.2* 27.7*   MCV 89.5 86.9 87.9   MCH 28.2 27.8 27.6   MCHC 31.5* 32.0* 31.4*   RDW 44.2 42.5 43.1   PLATELETCT 188 183 189   MPV 8.7*  9.2 8.9*       Recent Labs     06/09/24  0628   SODIUM 137   POTASSIUM 4.2   CHLORIDE 97   CO2 31   GLUCOSE 222*   BUN 18   CREATININE 0.63   CALCIUM 8.1*                   Intake/Output                         06/10/24 0700 - 06/11/24 0659 06/11/24 0700 - 06/12/24 0659     7890-2086 2084-0093 Total 0700-1859 1900-0659 Total                 Intake    P.O.  480  -- 480  --  -- --    P.O. 480 -- 480 -- -- --    Total Intake 480 -- 480 -- -- --       Output    Urine  1100  -- 1100  --  -- --    Number of Times Voided 1 x 1 x 2 x -- -- --    Urine Void (mL) 1100 -- 1100 -- -- --    Stool  --  -- --  --  -- --    Number of Times Stooled 1 x 1 x 2 x -- -- --    Total Output 1100 -- 1100 -- -- --       Net I/O     -620 -- -620 -- -- --              Intake/Output Summary (Last 24 hours) at 6/11/2024 1031  Last data filed at 6/10/2024 1423  Gross per 24 hour   Intake 240 ml   Output 600 ml   Net -360 ml             enoxaparin (LOVENOX) injection  40 mg DAILY AT 1800    methocarbamol  500 mg Q8HRS PRN    vitamin D3  2,000 Units DAILY    insulin GLARGINE  15 Units BID    dextrose  250 mL Q15 MIN PRN    Pharmacy Consult Request  1 Each PHARMACY TO DOSE    MD ALERT...DO NOT ADMINISTER NSAIDS or ASPIRIN unless ORDERED By Neurosurgery  1 Each PRN    docusate sodium  100 mg BID    senna-docusate  1 Tablet Nightly    senna-docusate  1 Tablet Q24HRS PRN    polyethylene glycol/lytes  1 Packet BID PRN    magnesium hydroxide  30 mL QDAY PRN    bisacodyl  10 mg Q24HRS PRN    sodium phosphate  1 Each Once PRN    diphenhydrAMINE  25 mg Q6HRS PRN    Or    diphenhydrAMINE  25 mg Q6HRS PRN    ondansetron  4 mg Q4HRS PRN    ondansetron  4 mg Q4HRS PRN    promethazine  12.5-25 mg Q4HRS PRN    promethazine  12.5-25 mg Q4HRS PRN    prochlorperazine  5-10 mg Q4HRS PRN    oxyCODONE immediate-release  5 mg Q3HRS PRN    Or    oxyCODONE immediate-release  10 mg Q3HRS PRN    Or    HYDROmorphone  0.5 mg Q3HRS PRN    lidocaine  2 Patch Q24HR     venlafaxine XR  75 mg QDAY with Breakfast    cyanocobalamin  1,000 mcg DAILY    melatonin  5 mg HS PRN    acetaminophen  650 mg Q6HRS PRN    insulin regular  2-9 Units 4X/DAY ACHS    metoclopramide  10 mg TID PRN    omeprazole  20 mg BID       Assessment and Plan:  Hospital day # 16  POD#7 C4-7 ACDF.  POD#4 redo L5 and L4 laminectomy with PSF.  PT/OT, encourage up to chair.  Very high risk for PNA, promote aggressive IS every shift  Ice incisions.   Rehab pending.  Drain removed, single suture placed.   Appreciate hospitalist care.   Following    Chemical prophylactic DVT therapy: Yes - Lovenox 40mg/qd    Start date/time: today     Brain Compression: No

## 2024-06-11 NOTE — DISCHARGE PLANNING
1023  Agency/Facility Name: [facility]  Spoke To: Pema   Outcome: DPA called regarding bed availability. They need insurance auth first and they are starting at this time.     1355  Agency/Facility Name: [facility]   Spoke To: Brian   Outcome: DPA called to confirm 1500 transport via their transportation. They have confirmed.

## 2024-06-11 NOTE — CARE PLAN
Problem: Pain - Standard  Goal: Alleviation of pain or a reduction in pain to the patient’s comfort goal  Outcome: Progressing    Problem: Respiratory  Goal: Patient will achieve/maintain optimum respiratory ventilation and gas exchange  Outcome: Progressing     Problem: Communication  Goal: The ability to communicate needs accurately and effectively will improve  Outcome: Progressing       The patient is Stable - Low risk of patient condition declining or worsening    Shift Goals  Clinical Goals: Pain control, incentive spirometer  Patient Goals: pain control, rest  Family Goals: n/a    Progress made toward(s) clinical / shift goals:  Taught pt 0-10 pain scale and non-pharmacological method of pain management, encouraged to verbalize when in pain. Administered PRN pain meds as needed. Educated importance of incentive spirometer usage 10 times an hour while awake, pt demonstrates understanding.     Patient is not progressing towards the following goals:

## 2024-06-11 NOTE — THERAPY
"Physical Therapy   Daily Treatment     Patient Name: Christoph Taylor  Age:  59 y.o., Sex:  male  Medical Record #: [id number]  Today's Date: 6/11/2024     Precautions  Precautions: Fall Risk;Spinal / Back Precautions ;Cervical Collar    Comments: L AFO in room, c-collar on AAT except showers and meals    Assessment    Pt received in bed and agreeable to PT session with encouragement. Pt required overall standby to min A to complete sup>sit, seated scooting, transfers, and ambulate a short distance. Pt was self-limited with ambulation and continued to require cues for safety with transfers. Continue to recommend post-acute placement fur further rehab. Will follow for acute PT.    Plan    Treatment Plan Status: Continue Current Treatment Plan  Type of Treatment: Equipment, Gait Training, Stair Training, Therapeutic Activities  Treatment Frequency: 4 Times per Week  Treatment Duration: Until Therapy Goals Met    DC Equipment Recommendations: Unable to determine at this time  Discharge Recommendations: Recommend post-acute placement for additional physical therapy services prior to discharge home      Subjective    \"Do we have to walk further?\"     Objective       06/11/24 0909   Precautions   Precautions Fall Risk;Spinal / Back Precautions ;Cervical Collar     Comments L AFO in room, c-collar on AAT except showers and meals   Vitals   O2 (LPM) 2   O2 Delivery Device Nasal Cannula   Vitals Comments Received on 2L   Pain 0 - 10 Group   Therapist Pain Assessment Post Activity Pain Same as Prior to Activity;Nurse Notified  (low back pain, not rated)   Cognition    Level of Consciousness Alert   Safety Awareness Impaired   Comments Pleasant and cooperative, but self-limiting due to pain. Continued cues for safety required   Balance   Sitting Balance (Static) Fair   Sitting Balance (Dynamic) Fair -   Standing Balance (Static) Fair -   Standing Balance (Dynamic) Poor +   Weight Shift Sitting Fair   Weight Shift Standing Fair "   Skilled Intervention Verbal Cuing;Compensatory Strategies;Tactile Cuing   Comments initiall forward trunk in sitting that pt was unable to correct to sit back up without assist; FWW used in standing   Bed Mobility    Supine to Sit Minimal Assist   Scooting Standby Assist   Rolling Standby Assist   Skilled Intervention Verbal Cuing;Compensatory Strategies   Comments HOB at 20 deg   Gait Analysis   Gait Level Of Assist Minimal Assist   Assistive Device Front Wheel Walker   Distance (Feet) 50   # of Times Distance was Traveled 1   Deviation Bradykinetic;Shuffled Gait;Decreased Heel Strike   Skilled Intervention Verbal Cuing;Compensatory Strategies   Comments Limited by pain   Functional Mobility   Sit to Stand Minimal Assist   Bed, Chair, Wheelchair Transfer Minimal Assist   Transfer Method Stand Step   Mobility up with FWW   Skilled Intervention Verbal Cuing   Comments Continued cues with transfers, reaches to sit before fully turned in front of the chair   6 Clicks Assessment - How much HELP from from another person do you currently need... (If the patient hasn't done an activity recently, how much help from another person do you think he/she would need if he/she tried?)   Turning from your back to your side while in a flat bed without using bedrails? 3   Moving from lying on your back to sitting on the side of a flat bed without using bedrails? 3   Moving to and from a bed to a chair (including a wheelchair)? 3   Standing up from a chair using your arms (e.g., wheelchair, or bedside chair)? 3   Walking in hospital room? 3   Climbing 3-5 steps with a railing? 2   6 clicks Mobility Score 17   Short Term Goals    Short Term Goal # 1 Patient will perform sit-stand with LRAD with supervision in 6 visits to progress with mobility   Goal Outcome # 1 goal not met   Short Term Goal # 2 Patient will perform chair transfers with LRAD with supervision in 6 visits to safely get OOB to chair   Goal Outcome # 2 Goal not met    Short Term Goal # 3 Patient will ambulate 100 feet with LRAD & AFO with supervision in 6 visits to safely ambulate household distance   Goal Outcome # 3 Goal not met   Short Term Goal # 4 Patient will negotiate 6 steps with LRAD with supervision in 6 visits to safely get in & out of his house   Goal Outcome # 4 Goal not met   Short Term Goal # 5 pt will move supine<>eob with spv in 6 tx for bed mobility.   Goal Outcome # 5 Goal not met   Physical Therapy Treatment Plan   Physical Therapy Treatment Plan Continue Current Treatment Plan   Anticipated Discharge Equipment and Recommendations   DC Equipment Recommendations Unable to determine at this time   Discharge Recommendations Recommend post-acute placement for additional physical therapy services prior to discharge home   Interdisciplinary Plan of Care Collaboration   IDT Collaboration with  Nursing   Patient Position at End of Therapy Seated;Chair Alarm On;Call Light within Reach;Phone within Reach;Tray Table within Reach   Collaboration Comments RN updated   Session Information   Date / Session Number  6/11-4 (1/4, 6/17)

## 2024-06-11 NOTE — DISCHARGE SUMMARY
Discharge Summary    CHIEF COMPLAINT ON ADMISSION  Chief Complaint   Patient presents with    Back Pain    Suicidal Ideation       Reason for Admission  EMS    Admission Date  5/27/2024     CODE STATUS  Full Code    HPI & HOSPITAL COURSE    Kevin Taylor is a 59-year-old male with a PMHx DMT2, GERD with esophagitis, chronic back pain.  Admitted 5/27 for pneumonia and cauda equina syndrome.    CT C/A/P 5/27: Patchy bilateral infiltrates, greatest in upper right lobe.  Distal esophageal wall thickening    MRI spine showing severe spinal stenosis of cervical and lumbar spine.  S/p C4-C7 ACDF on 6/4 and L4-L5 laminectomies with stealth fusion 6/7.     Per neurosurgery, patient is cleared for transfer to skilled facility.  Lumbar incision with no active drainage.  Drains have been removed and a suture placed.    Patient completed antibiotic course with ceftriaxone and doxycycline on 6/2.  PT OT recommend skilled placement.  Patient has been accepted at skilled facility.    Patient is cleared for transfer to skilled facility.    Therefore, he is discharged in good and stable condition to skilled nursing facility.    The patient met 2-midnight criteria for an inpatient stay at the time of discharge.      FOLLOW UP ITEMS POST DISCHARGE  Pcp in 2 days    DISCHARGE DIAGNOSES  Principal Problem:    Cervical stenosis of spinal canal (POA: Unknown)  Active Problems:    Acute esophagitis (POA: Yes)    Diabetes mellitus type 2, insulin dependent, poorly controlled with marked hyperglycemia (HCC) (POA: Yes)    Thoracic back pain (POA: Yes)    Acute on chronic anemia (POA: Yes)    Acute respiratory failure with hypoxia (HCC) (POA: Yes)    Pneumonia due to infectious organism (POA: Yes)    Fall (POA: Unknown)    Cauda equina compression (HCC) (POA: Unknown)    Chronic renal insufficiency (POA: Unknown)    Agitation (POA: Unknown)  Resolved Problems:    * No resolved hospital problems. *      FOLLOW UP  No future  appointments.  Moo Pulido III, M.D.  [address]  [place] [id number] [phone]    Follow up in 2 week(s)  Follow up      MEDICATIONS ON DISCHARGE     Medication List        START taking these medications        Instructions   acetaminophen 325 MG Tabs  Commonly known as: Tylenol   Take 2 Tablets by mouth every 6 hours as needed for Mild Pain, Moderate Pain or Fever.  Dose: 650 mg     bisacodyl 10 MG Supp  Commonly known as: Dulcolax   Insert 1 Suppository into the rectum every 24 hours as needed (if sennosides, docusate, polyethylene glycol 3350, and/or magnesium hydroxide ineffective or not ordered).  Dose: 10 mg     Cholecalciferol 2000 UNIT Tabs  Start taking on: June 12, 2024   Take 1 Tablet by mouth every day.  Dose: 2,000 Units     cyanocobalamin 1000 MCG Tabs  Start taking on: June 12, 2024  Commonly known as: Vitamin B12   Take 1 Tablet by mouth every day.  Dose: 1,000 mcg     diphenhydrAMINE 25 MG Tabs  Commonly known as: Benadryl   Take 1 Tablet by mouth every 6 hours as needed for Itching.  Dose: 25 mg     docusate sodium 100 MG Caps   Take 100 mg by mouth 2 times a day.  Dose: 100 mg     enoxaparin 40 MG/0.4ML Sosy inj  Commonly known as: Lovenox   Inject 40 mg under the skin every day at 6 PM.  Dose: 40 mg     insulin GLARGINE 100 UNIT/ML Soln  Commonly known as: Lantus,Semglee  Replaces: Lantus SoloStar 100 UNIT/ML Sopn injection   Inject 15 Units under the skin 2 times a day.  Dose: 15 Units     insulin regular 100 Unit/mL Soln  Commonly known as: Humulin R   Inject 2-9 Units under the skin 4 Times a Day,Before Meals and at Bedtime.  Dose: 2-9 Units     lidocaine 4 % Ptch  Start taking on: June 12, 2024  Commonly known as: Asperflex   Place 2 Patches on the skin every 24 hours.  Dose: 2 Patch     magnesium hydroxide 400 MG/5ML Susp  Commonly known as: Milk Of Magnesia   Take 30 mL by mouth 1 time a day as needed (if sennosides, docusate, and/or polyethylene glycol 3350  ineffective or not ordered).  Dose: 30 mL     melatonin 5 mg Tabs   Take 1 Tablet by mouth at bedtime as needed (Sleep).  Dose: 5 mg     methocarbamol 500 MG Tabs  Commonly known as: Robaxin   Take 1 Tablet by mouth every 8 hours as needed (muscle spasm).  Dose: 500 mg     omeprazole 20 MG delayed-release capsule  Commonly known as: PriLOSEC  Replaces: pantoprazole 40 MG Tbec   Take 1 Capsule by mouth 2 times a day.  Dose: 20 mg     ondansetron 4 MG Tbdp  Commonly known as: Zofran ODT   Take 1 Tablet by mouth every four hours as needed for Nausea/Vomiting (Give PO if no IV route available.).  Dose: 4 mg     senna-docusate 8.6-50 MG Tabs  Commonly known as: Pericolace Or Senokot S   Take 1 Tablet by mouth every evening.  Dose: 1 Tablet     venlafaxine XR 75 MG Cp24  Start taking on: June 12, 2024  Commonly known as: Effexor XR   Take 1 Capsule by mouth every morning with breakfast.  Dose: 75 mg            CHANGE how you take these medications        Instructions   * oxyCODONE immediate-release 5 MG Tabs  What changed:   when to take this  additional instructions  Commonly known as: Roxicodone   Take 1 Tablet by mouth every 3 hours as needed for Severe Pain for up to 3 days.  Dose: 5 mg     * oxyCODONE immediate release 10 MG immediate release tablet  What changed: You were already taking a medication with the same name, and this prescription was added. Make sure you understand how and when to take each.  Commonly known as: Roxicodone   Take 1 Tablet by mouth every 3 hours as needed for Moderate Pain for up to 3 days.  Dose: 10 mg           * This list has 2 medication(s) that are the same as other medications prescribed for you. Read the directions carefully, and ask your doctor or other care provider to review them with you.                CONTINUE taking these medications        Instructions   aspirin 81 MG EC tablet   Take 81 mg by mouth every evening.  Dose: 81 mg     metoclopramide 10 MG Tabs  Commonly known as:  Reglan   Take 10 mg by mouth 3 times a day as needed. Indications: Indigestion  Dose: 10 mg     multivitamin Tabs   Take 1 Tablet by mouth every evening.  Dose: 1 Tablet            STOP taking these medications      Lantus SoloStar 100 UNIT/ML Sopn injection  Generic drug: insulin glargine  Replaced by: insulin GLARGINE 100 UNIT/ML Soln     pantoprazole 40 MG Tbec  Commonly known as: Protonix  Replaced by: omeprazole 20 MG delayed-release capsule              Allergies  No Known Allergies    DIET  Orders Placed This Encounter   Procedures    Diet Order Diet: Consistent CHO (Diabetic)     Standing Status:   Standing     Number of Occurrences:   1     Order Specific Question:   Diet:     Answer:   Consistent CHO (Diabetic) [4]       ACTIVITY  As tolerated and directed by skilled nursing.  Weight bearing as tolerated    LINES, DRAINS, AND WOUNDS  This is an automated list. Peripheral IVs will be removed prior to discharge.  Peripheral IV 06/04/24 20 G Right;Lateral Forearm (Active)   Site Assessment Dry;Intact 06/11/24 0000   Dressing Type Transparent 06/11/24 0000   Line Status Saline locked 06/11/24 0000   Dressing Status Dry;Intact;Old drainage 06/11/24 0000   Dressing Intervention N/A 06/11/24 0000   Dressing Change Due 06/15/24 06/10/24 0831   Date Primary Tubing Changed 06/04/24 06/04/24 2022   Date Secondary Tubing Changed 06/04/24 06/04/24 2022   Infiltration Grading ([facility], [facility]) 0 06/11/24 0000   Phlebitis Scale ([facility] Only) 0 06/11/24 0000       Moisture Associated Skin Damage 06/06/24 Buttock;Perineum (Active)   Wound Image   06/06/24 1200   NEXT Weekly Photo (Inpatient Only) 06/13/24 06/06/24 1200   Drainage Amount None 06/11/24 0819   Periwound Assessment Clean;Dry;Intact;[place] 06/10/24 2100   IAD Cleansing Foam Cleanser/Washcloth 06/06/24 2100   Periwound Protectant Barrier Paste 06/10/24 2100   IAD Containment Device None 06/06/24 1200   Length (cm) 3 06/06/24 1200   Width (cm) 0.5 06/06/24 1200   WOUND  NURSE ONLY - Time Spent with Patient (mins) 30 06/06/24 1200       Wound 03/11/23 Hand;Finger, Thumb;Finger, 2nd;Finger, 3rd;Finger, 4th;Finger, 5th Left (Active)       Wound 03/11/23 Hand Right (Active)       Wound 03/11/23 Toe, Hallux Left (Active)       Wound 06/12/23 Incision Finger, Thumb Right 4x4, nathaly, coban  (Active)       Wound 01/26/24 Arm Distal;Anterior Left (Active)       Wound 05/06/24 Ankle Ventral Left (Active)       Wound 05/06/24 Ankle Anterior Left (Active)       Wound 06/04/24 Incision Neck Bilateral steri, mastisol, 4x4, tegaderm (Active)   Site Assessment KATHERINE 06/11/24 0819   Periwound Assessment KATHERINE 06/11/24 0819   Margins KATHERINE 06/11/24 0819   Closure KATHERINE 06/11/24 0819   Drainage Amount None 06/11/24 0819   Dressing Status Clean;Intact;Dry 06/11/24 0819   Dressing Changed Observed 06/11/24 0819   Dressing Options Dry Gauze;Transparent Film 06/10/24 2100       Wound 06/07/24 Incision Back BACITRACIN, 4X4, TEGADERM (Active)   Site Assessment KATHERINE 06/11/24 0819   Periwound Assessment KATHERINE 06/11/24 0819   Margins KATHERINE 06/11/24 0819   Closure KATHERINE 06/11/24 0819   Drainage Amount Small 06/11/24 0819   Drainage Description Serosanguineous 06/11/24 0819   Dressing Status Dry;Intact;Old drainage 06/11/24 0819   Dressing Changed Observed 06/11/24 0819   Dressing Options Dry Gauze;Transparent Film 06/10/24 2100       Peripheral IV 06/04/24 20 G Right;Lateral Forearm (Active)   Site Assessment Dry;Intact 06/11/24 0000   Dressing Type Transparent 06/11/24 0000   Line Status Saline locked 06/11/24 0000   Dressing Status Dry;Intact;Old drainage 06/11/24 0000   Dressing Intervention N/A 06/11/24 0000   Dressing Change Due 06/15/24 06/10/24 0831   Date Primary Tubing Changed 06/04/24 06/04/24 2022   Date Secondary Tubing Changed 06/04/24 06/04/24 2022   Infiltration Grading ([facility], [facility]) 0 06/11/24 0000   Phlebitis Scale ([facility] Only) 0 06/11/24 0000               MENTAL STATUS ON TRANSFER              CONSULTATIONS  Psychiatry  neurosurgery    PROCEDURES  FUSION, SPINE, LUMBAR, PLIF- REDO LUMBAR 4-5 LAMINECTOMY WITH L4-5 STEALTH FUSION     1.  Redo bilateral L5 laminotomy, foraminotomy, decompression of thecal sac   and nerve roots.  2. L4 laminectomy.  3.  Bilateral L5 transpedicular approaches facetectomies, 59 modifier.  4.  Stealth guidance for the spine.  5.  Hip bone marrow autograft aspirate concentrate via separate incision.  6.  L4, L5 Stealth-guided pedicle screw fixation.  7.  L4-L5 posterolateral allograft autograft stephania arthrodesis and fusion.    1.  Partial C4 corpectomy, diskectomy, decompression of thecal sac and nerves   roots, 59 modifier.  2.  Partial C5 corpectomy, diskectomy, decompression of thecal sac and nerves   roots, 59 modifier.  3.  Partial C6 corpectomy, diskectomy, decompression of thecal sac and nerves   roots, 59 modifier.  4.  Partial C7 corpectomy, diskectomy, decompression of thecal sac and nerves   roots, 59 modifier.  5.  C4-5, 5-6, 6-7 titanium interbody cage placement.  6.  C4-5, 5-6, 6-7 interbody allograft autograft anterior arthrodesis and   fusion.  7.  C4, C5, C6, C7 anterior plating fixation.  8.  Microscopic dissection.  9.  Intraoperative monitoring.    C4-C7 ANTERIOR CERVICAL DISC AND FUSION           LABORATORY  Lab Results   Component Value Date    SODIUM 137 06/09/2024    POTASSIUM 4.2 06/09/2024    CHLORIDE 97 06/09/2024    CO2 31 06/09/2024    GLUCOSE 222 (H) 06/09/2024    BUN 18 06/09/2024    CREATININE 0.63 06/09/2024        Lab Results   Component Value Date    WBC 7.8 06/11/2024    HEMOGLOBIN 8.7 (L) 06/11/2024    HEMATOCRIT 27.7 (L) 06/11/2024    PLATELETCT 189 06/11/2024        Total time of the discharge process exceeds 45 minutes.

## 2024-06-12 ENCOUNTER — HOSPITAL ENCOUNTER (INPATIENT)
Facility: MEDICAL CENTER | Age: 59
LOS: 4 days | DRG: 193 | End: 2024-06-18
Attending: EMERGENCY MEDICINE | Admitting: STUDENT IN AN ORGANIZED HEALTH CARE EDUCATION/TRAINING PROGRAM
Payer: COMMERCIAL

## 2024-06-12 ENCOUNTER — APPOINTMENT (OUTPATIENT)
Dept: RADIOLOGY | Facility: MEDICAL CENTER | Age: 59
DRG: 193 | End: 2024-06-12
Attending: EMERGENCY MEDICINE
Payer: COMMERCIAL

## 2024-06-12 DIAGNOSIS — N18.31 CHRONIC RENAL IMPAIRMENT, STAGE 3A: ICD-10-CM

## 2024-06-12 DIAGNOSIS — E13.621 DIABETIC ULCER OF TOE ASSOCIATED WITH DIABETES MELLITUS OF OTHER TYPE, UNSPECIFIED LATERALITY, UNSPECIFIED ULCER STAGE (HCC): ICD-10-CM

## 2024-06-12 DIAGNOSIS — R10.12 INTRACTABLE LEFT UPPER QUADRANT ABDOMINAL PAIN: ICD-10-CM

## 2024-06-12 DIAGNOSIS — R45.1 AGITATION: ICD-10-CM

## 2024-06-12 DIAGNOSIS — Z79.4 DIABETES MELLITUS TYPE 2, INSULIN DEPENDENT (HCC): ICD-10-CM

## 2024-06-12 DIAGNOSIS — M54.6 ACUTE THORACIC BACK PAIN, UNSPECIFIED BACK PAIN LATERALITY: ICD-10-CM

## 2024-06-12 DIAGNOSIS — M86.241 SUBACUTE OSTEOMYELITIS OF RIGHT HAND (HCC): ICD-10-CM

## 2024-06-12 DIAGNOSIS — I95.9 HYPOTENSION, UNSPECIFIED HYPOTENSION TYPE: ICD-10-CM

## 2024-06-12 DIAGNOSIS — K92.2 UPPER GI BLEED: ICD-10-CM

## 2024-06-12 DIAGNOSIS — R73.9 HYPERGLYCEMIA: ICD-10-CM

## 2024-06-12 DIAGNOSIS — R74.01 TRANSAMINITIS: ICD-10-CM

## 2024-06-12 DIAGNOSIS — Z91.148 NONCOMPLIANCE WITH MEDICATIONS: ICD-10-CM

## 2024-06-12 DIAGNOSIS — J18.9 MULTIFOCAL PNEUMONIA: ICD-10-CM

## 2024-06-12 DIAGNOSIS — F51.01 PRIMARY INSOMNIA: ICD-10-CM

## 2024-06-12 DIAGNOSIS — R53.81 DEBILITY: ICD-10-CM

## 2024-06-12 DIAGNOSIS — Z86.19 HX OF CLOSTRIDIUM DIFFICILE INFECTION: ICD-10-CM

## 2024-06-12 DIAGNOSIS — N30.00 ACUTE CYSTITIS WITHOUT HEMATURIA: ICD-10-CM

## 2024-06-12 DIAGNOSIS — R79.89 ELEVATED TROPONIN: ICD-10-CM

## 2024-06-12 DIAGNOSIS — E11.9 DIABETES MELLITUS TYPE 2, INSULIN DEPENDENT (HCC): ICD-10-CM

## 2024-06-12 DIAGNOSIS — K56.49: ICD-10-CM

## 2024-06-12 DIAGNOSIS — E11.10 DIABETIC KETOACIDOSIS WITHOUT COMA ASSOCIATED WITH TYPE 2 DIABETES MELLITUS (HCC): ICD-10-CM

## 2024-06-12 DIAGNOSIS — R10.12 LEFT UPPER QUADRANT ABDOMINAL PAIN: ICD-10-CM

## 2024-06-12 DIAGNOSIS — R33.9 URINARY RETENTION: ICD-10-CM

## 2024-06-12 DIAGNOSIS — R79.89 ELEVATED LIVER FUNCTION TESTS: ICD-10-CM

## 2024-06-12 DIAGNOSIS — D72.825 BANDEMIA: ICD-10-CM

## 2024-06-12 DIAGNOSIS — K20.90 ACUTE ESOPHAGITIS: ICD-10-CM

## 2024-06-12 DIAGNOSIS — E87.29 METABOLIC ACIDOSIS, INCREASED ANION GAP: ICD-10-CM

## 2024-06-12 DIAGNOSIS — Z78.9 FULL CODE STATUS: ICD-10-CM

## 2024-06-12 DIAGNOSIS — G83.4 CAUDA EQUINA COMPRESSION (HCC): ICD-10-CM

## 2024-06-12 DIAGNOSIS — K59.00 CONSTIPATION, UNSPECIFIED CONSTIPATION TYPE: ICD-10-CM

## 2024-06-12 DIAGNOSIS — R26.89 FUNCTIONAL GAIT ABNORMALITY: ICD-10-CM

## 2024-06-12 DIAGNOSIS — E11.65 UNCONTROLLED TYPE 2 DIABETES MELLITUS WITH HYPERGLYCEMIA (HCC): ICD-10-CM

## 2024-06-12 DIAGNOSIS — M25.551 RIGHT HIP PAIN: ICD-10-CM
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
## 2024-06-12 DIAGNOSIS — G93.41 ACUTE METABOLIC ENCEPHALOPATHY: ICD-10-CM

## 2024-06-12 DIAGNOSIS — E87.1 HYPONATREMIA: ICD-10-CM

## 2024-06-12 DIAGNOSIS — K29.00 ACUTE SUPERFICIAL GASTRITIS WITHOUT HEMORRHAGE: ICD-10-CM

## 2024-06-12 DIAGNOSIS — M48.02 CERVICAL STENOSIS OF SPINAL CANAL: ICD-10-CM

## 2024-06-12 DIAGNOSIS — D64.9 ACUTE ON CHRONIC ANEMIA: ICD-10-CM

## 2024-06-12 DIAGNOSIS — D64.9 ANEMIA, UNSPECIFIED TYPE: ICD-10-CM

## 2024-06-12 DIAGNOSIS — E11.00 TYPE 2 DIABETES MELLITUS WITH HYPEROSMOLAR NONKETOTIC HYPERGLYCEMIA (HCC): ICD-10-CM

## 2024-06-12 DIAGNOSIS — R55 SYNCOPE, UNSPECIFIED SYNCOPE TYPE: ICD-10-CM

## 2024-06-12 DIAGNOSIS — R10.9 ABDOMINAL PAIN, UNSPECIFIED ABDOMINAL LOCATION: ICD-10-CM

## 2024-06-12 DIAGNOSIS — K52.9 ENTERITIS: ICD-10-CM

## 2024-06-12 DIAGNOSIS — E78.5 DYSLIPIDEMIA: ICD-10-CM

## 2024-06-12 DIAGNOSIS — L97.509 DIABETIC ULCER OF TOE ASSOCIATED WITH DIABETES MELLITUS OF OTHER TYPE, UNSPECIFIED LATERALITY, UNSPECIFIED ULCER STAGE (HCC): ICD-10-CM

## 2024-06-12 DIAGNOSIS — J96.21 ACUTE ON CHRONIC RESPIRATORY FAILURE WITH HYPOXIA (HCC): ICD-10-CM

## 2024-06-12 DIAGNOSIS — E16.2 HYPOGLYCEMIA: ICD-10-CM

## 2024-06-12 DIAGNOSIS — N40.0 BENIGN PROSTATIC HYPERPLASIA WITHOUT LOWER URINARY TRACT SYMPTOMS: ICD-10-CM

## 2024-06-12 PROBLEM — J96.20 ACUTE ON CHRONIC RESPIRATORY FAILURE (HCC): Status: ACTIVE | Noted: 2024-05-20

## 2024-06-12 LAB
ABO + RH BLD: NORMAL
ABO GROUP BLD: NORMAL
ALBUMIN SERPL BCP-MCNC: 2.6 G/DL (ref 3.2–4.9)
ALBUMIN/GLOB SERPL: 0.8 G/DL
ALP SERPL-CCNC: 806 U/L (ref 30–99)
ALT SERPL-CCNC: 318 U/L (ref 2–50)
ANION GAP SERPL CALC-SCNC: 11 MMOL/L (ref 7–16)
APAP SERPL-MCNC: <5 UG/ML (ref 10–30)
APPEARANCE UR: CLEAR
AST SERPL-CCNC: 628 U/L (ref 12–45)
BACTERIA #/AREA URNS HPF: NEGATIVE /HPF
BARCODED ABORH UBTYP: 600
BARCODED PRD CODE UBPRD: NORMAL
BARCODED UNIT NUM UBUNT: NORMAL
BASOPHILS # BLD AUTO: 0.1 % (ref 0–1.8)
BASOPHILS # BLD: 0.01 K/UL (ref 0–0.12)
BILIRUB SERPL-MCNC: 0.2 MG/DL (ref 0.1–1.5)
BILIRUB UR QL STRIP.AUTO: NEGATIVE
BLD GP AB SCN SERPL QL: NORMAL
BUN SERPL-MCNC: 29 MG/DL (ref 8–22)
CALCIUM ALBUM COR SERPL-MCNC: 9.4 MG/DL (ref 8.5–10.5)
CALCIUM SERPL-MCNC: 8.3 MG/DL (ref 8.5–10.5)
CHLORIDE SERPL-SCNC: 99 MMOL/L (ref 96–112)
CO2 SERPL-SCNC: 28 MMOL/L (ref 20–33)
COLOR UR: YELLOW
COMPONENT R 8504R: NORMAL
CREAT SERPL-MCNC: 1.13 MG/DL (ref 0.5–1.4)
EOSINOPHIL # BLD AUTO: 0.01 K/UL (ref 0–0.51)
EOSINOPHIL NFR BLD: 0.1 % (ref 0–6.9)
EPI CELLS #/AREA URNS HPF: NEGATIVE /HPF
ERYTHROCYTE [DISTWIDTH] IN BLOOD BY AUTOMATED COUNT: 43.6 FL (ref 35.9–50)
ETHANOL BLD-MCNC: <10.1 MG/DL
GFR SERPLBLD CREATININE-BSD FMLA CKD-EPI: 74 ML/MIN/1.73 M 2
GLOBULIN SER CALC-MCNC: 3.3 G/DL (ref 1.9–3.5)
GLUCOSE SERPL-MCNC: 177 MG/DL (ref 65–99)
GLUCOSE UR STRIP.AUTO-MCNC: 100 MG/DL
HAV IGM SERPL QL IA: NORMAL
HBV CORE IGM SER QL: NORMAL
HBV SURFACE AG SER QL: NORMAL
HCT VFR BLD AUTO: 24 % (ref 42–52)
HCV AB SER QL: NORMAL
HGB BLD-MCNC: 7.6 G/DL (ref 14–18)
HGB BLD-MCNC: 9.1 G/DL (ref 14–18)
HYALINE CASTS #/AREA URNS LPF: ABNORMAL /LPF
IMM GRANULOCYTES # BLD AUTO: 0.03 K/UL (ref 0–0.11)
IMM GRANULOCYTES NFR BLD AUTO: 0.4 % (ref 0–0.9)
KETONES UR STRIP.AUTO-MCNC: NEGATIVE MG/DL
LACTATE SERPL-SCNC: 0.9 MMOL/L (ref 0.5–2)
LEUKOCYTE ESTERASE UR QL STRIP.AUTO: NEGATIVE
LYMPHOCYTES # BLD AUTO: 0.34 K/UL (ref 1–4.8)
LYMPHOCYTES NFR BLD: 4.4 % (ref 22–41)
MAGNESIUM SERPL-MCNC: 2 MG/DL (ref 1.5–2.5)
MCH RBC QN AUTO: 27.9 PG (ref 27–33)
MCHC RBC AUTO-ENTMCNC: 31.7 G/DL (ref 32.3–36.5)
MCV RBC AUTO: 88.2 FL (ref 81.4–97.8)
MICRO URNS: ABNORMAL
MONOCYTES # BLD AUTO: 0.58 K/UL (ref 0–0.85)
MONOCYTES NFR BLD AUTO: 7.6 % (ref 0–13.4)
NEUTROPHILS # BLD AUTO: 6.69 K/UL (ref 1.82–7.42)
NEUTROPHILS NFR BLD: 87.4 % (ref 44–72)
NITRITE UR QL STRIP.AUTO: NEGATIVE
NRBC # BLD AUTO: 0 K/UL
NRBC BLD-RTO: 0 /100 WBC (ref 0–0.2)
PH UR STRIP.AUTO: 5.5 [PH] (ref 5–8)
PLATELET # BLD AUTO: 208 K/UL (ref 164–446)
PMV BLD AUTO: 9 FL (ref 9–12.9)
POTASSIUM SERPL-SCNC: 4.1 MMOL/L (ref 3.6–5.5)
PRODUCT TYPE UPROD: NORMAL
PROT SERPL-MCNC: 5.9 G/DL (ref 6–8.2)
PROT UR QL STRIP: 100 MG/DL
RBC # BLD AUTO: 2.72 M/UL (ref 4.7–6.1)
RBC # URNS HPF: ABNORMAL /HPF
RBC UR QL AUTO: NEGATIVE
RH BLD: NORMAL
SODIUM SERPL-SCNC: 138 MMOL/L (ref 135–145)
SP GR UR STRIP.AUTO: 1.02
UNIT STATUS USTAT: NORMAL
UROBILINOGEN UR STRIP.AUTO-MCNC: 0.2 MG/DL
WBC # BLD AUTO: 7.7 K/UL (ref 4.8–10.8)
WBC #/AREA URNS HPF: ABNORMAL /HPF

## 2024-06-12 PROCEDURE — 80143 DRUG ASSAY ACETAMINOPHEN: CPT

## 2024-06-12 PROCEDURE — C9113 INJ PANTOPRAZOLE SODIUM, VIA: HCPCS | Performed by: STUDENT IN AN ORGANIZED HEALTH CARE EDUCATION/TRAINING PROGRAM

## 2024-06-12 PROCEDURE — 86850 RBC ANTIBODY SCREEN: CPT

## 2024-06-12 PROCEDURE — 96365 THER/PROPH/DIAG IV INF INIT: CPT

## 2024-06-12 PROCEDURE — 83605 ASSAY OF LACTIC ACID: CPT

## 2024-06-12 PROCEDURE — 99223 1ST HOSP IP/OBS HIGH 75: CPT | Performed by: STUDENT IN AN ORGANIZED HEALTH CARE EDUCATION/TRAINING PROGRAM

## 2024-06-12 PROCEDURE — 36415 COLL VENOUS BLD VENIPUNCTURE: CPT

## 2024-06-12 PROCEDURE — 94640 AIRWAY INHALATION TREATMENT: CPT

## 2024-06-12 PROCEDURE — 96375 TX/PRO/DX INJ NEW DRUG ADDON: CPT

## 2024-06-12 PROCEDURE — G0378 HOSPITAL OBSERVATION PER HR: HCPCS

## 2024-06-12 PROCEDURE — 87077 CULTURE AEROBIC IDENTIFY: CPT

## 2024-06-12 PROCEDURE — 80074 ACUTE HEPATITIS PANEL: CPT

## 2024-06-12 PROCEDURE — 86923 COMPATIBILITY TEST ELECTRIC: CPT

## 2024-06-12 PROCEDURE — 87086 URINE CULTURE/COLONY COUNT: CPT

## 2024-06-12 PROCEDURE — 700102 HCHG RX REV CODE 250 W/ 637 OVERRIDE(OP): Performed by: STUDENT IN AN ORGANIZED HEALTH CARE EDUCATION/TRAINING PROGRAM

## 2024-06-12 PROCEDURE — 96372 THER/PROPH/DIAG INJ SC/IM: CPT

## 2024-06-12 PROCEDURE — 700111 HCHG RX REV CODE 636 W/ 250 OVERRIDE (IP): Mod: JZ | Performed by: EMERGENCY MEDICINE

## 2024-06-12 PROCEDURE — 700111 HCHG RX REV CODE 636 W/ 250 OVERRIDE (IP): Performed by: STUDENT IN AN ORGANIZED HEALTH CARE EDUCATION/TRAINING PROGRAM

## 2024-06-12 PROCEDURE — 86901 BLOOD TYPING SEROLOGIC RH(D): CPT

## 2024-06-12 PROCEDURE — 700105 HCHG RX REV CODE 258: Performed by: STUDENT IN AN ORGANIZED HEALTH CARE EDUCATION/TRAINING PROGRAM

## 2024-06-12 PROCEDURE — 83735 ASSAY OF MAGNESIUM: CPT

## 2024-06-12 PROCEDURE — 700101 HCHG RX REV CODE 250: Performed by: STUDENT IN AN ORGANIZED HEALTH CARE EDUCATION/TRAINING PROGRAM

## 2024-06-12 PROCEDURE — 81001 URINALYSIS AUTO W/SCOPE: CPT

## 2024-06-12 PROCEDURE — P9016 RBC LEUKOCYTES REDUCED: HCPCS

## 2024-06-12 PROCEDURE — 700105 HCHG RX REV CODE 258: Performed by: EMERGENCY MEDICINE

## 2024-06-12 PROCEDURE — 71045 X-RAY EXAM CHEST 1 VIEW: CPT

## 2024-06-12 PROCEDURE — A9270 NON-COVERED ITEM OR SERVICE: HCPCS | Performed by: STUDENT IN AN ORGANIZED HEALTH CARE EDUCATION/TRAINING PROGRAM

## 2024-06-12 PROCEDURE — 85018 HEMOGLOBIN: CPT

## 2024-06-12 PROCEDURE — 36430 TRANSFUSION BLD/BLD COMPNT: CPT

## 2024-06-12 PROCEDURE — 86900 BLOOD TYPING SEROLOGIC ABO: CPT

## 2024-06-12 PROCEDURE — C9113 INJ PANTOPRAZOLE SODIUM, VIA: HCPCS | Performed by: EMERGENCY MEDICINE

## 2024-06-12 PROCEDURE — 30233N1 TRANSFUSION OF NONAUTOLOGOUS RED BLOOD CELLS INTO PERIPHERAL VEIN, PERCUTANEOUS APPROACH: ICD-10-PCS | Performed by: EMERGENCY MEDICINE

## 2024-06-12 PROCEDURE — 80053 COMPREHEN METABOLIC PANEL: CPT

## 2024-06-12 PROCEDURE — 82077 ASSAY SPEC XCP UR&BREATH IA: CPT

## 2024-06-12 PROCEDURE — 96376 TX/PRO/DX INJ SAME DRUG ADON: CPT

## 2024-06-12 PROCEDURE — 85025 COMPLETE CBC W/AUTO DIFF WBC: CPT

## 2024-06-12 PROCEDURE — 99285 EMERGENCY DEPT VISIT HI MDM: CPT

## 2024-06-12 PROCEDURE — 87040 BLOOD CULTURE FOR BACTERIA: CPT

## 2024-06-12 RX ORDER — OXYCODONE HYDROCHLORIDE 10 MG/1
10 TABLET ORAL
Status: DISCONTINUED | OUTPATIENT
Start: 2024-06-12 | End: 2024-06-14

## 2024-06-12 RX ORDER — PANTOPRAZOLE SODIUM 40 MG/10ML
40 INJECTION, POWDER, LYOPHILIZED, FOR SOLUTION INTRAVENOUS ONCE
Status: COMPLETED | OUTPATIENT
Start: 2024-06-12 | End: 2024-06-12

## 2024-06-12 RX ORDER — PROMETHAZINE HYDROCHLORIDE 25 MG/1
12.5-25 SUPPOSITORY RECTAL EVERY 4 HOURS PRN
Status: DISCONTINUED | OUTPATIENT
Start: 2024-06-12 | End: 2024-06-18 | Stop reason: HOSPADM

## 2024-06-12 RX ORDER — UREA 10 %
1000 LOTION (ML) TOPICAL DAILY
COMMUNITY

## 2024-06-12 RX ORDER — VENLAFAXINE HYDROCHLORIDE 75 MG/1
75 CAPSULE, EXTENDED RELEASE ORAL
Status: DISCONTINUED | OUTPATIENT
Start: 2024-06-13 | End: 2024-06-18 | Stop reason: HOSPADM

## 2024-06-12 RX ORDER — AMOXICILLIN 250 MG
2 CAPSULE ORAL EVERY EVENING
Status: DISCONTINUED | OUTPATIENT
Start: 2024-06-12 | End: 2024-06-18 | Stop reason: HOSPADM

## 2024-06-12 RX ORDER — ONDANSETRON 4 MG/1
4 TABLET, ORALLY DISINTEGRATING ORAL EVERY 4 HOURS PRN
Status: DISCONTINUED | OUTPATIENT
Start: 2024-06-12 | End: 2024-06-18 | Stop reason: HOSPADM

## 2024-06-12 RX ORDER — PROCHLORPERAZINE EDISYLATE 5 MG/ML
5-10 INJECTION INTRAMUSCULAR; INTRAVENOUS EVERY 4 HOURS PRN
Status: DISCONTINUED | OUTPATIENT
Start: 2024-06-12 | End: 2024-06-18 | Stop reason: HOSPADM

## 2024-06-12 RX ORDER — UREA 10 %
5 LOTION (ML) TOPICAL NIGHTLY PRN
Status: DISCONTINUED | OUTPATIENT
Start: 2024-06-12 | End: 2024-06-18 | Stop reason: HOSPADM

## 2024-06-12 RX ORDER — HYDROMORPHONE HYDROCHLORIDE 1 MG/ML
0.5 INJECTION, SOLUTION INTRAMUSCULAR; INTRAVENOUS; SUBCUTANEOUS
Status: DISCONTINUED | OUTPATIENT
Start: 2024-06-12 | End: 2024-06-14

## 2024-06-12 RX ORDER — IPRATROPIUM BROMIDE AND ALBUTEROL SULFATE 2.5; .5 MG/3ML; MG/3ML
3 SOLUTION RESPIRATORY (INHALATION) EVERY 4 HOURS
Status: DISCONTINUED | OUTPATIENT
Start: 2024-06-12 | End: 2024-06-13

## 2024-06-12 RX ORDER — OXYCODONE HYDROCHLORIDE 5 MG/1
5 TABLET ORAL
Status: DISCONTINUED | OUTPATIENT
Start: 2024-06-12 | End: 2024-06-14

## 2024-06-12 RX ORDER — METHOCARBAMOL 500 MG/1
500 TABLET, FILM COATED ORAL EVERY 8 HOURS PRN
Status: DISCONTINUED | OUTPATIENT
Start: 2024-06-12 | End: 2024-06-18 | Stop reason: HOSPADM

## 2024-06-12 RX ORDER — PANTOPRAZOLE SODIUM 40 MG/10ML
40 INJECTION, POWDER, LYOPHILIZED, FOR SOLUTION INTRAVENOUS 2 TIMES DAILY
Status: DISCONTINUED | OUTPATIENT
Start: 2024-06-12 | End: 2024-06-18 | Stop reason: HOSPADM

## 2024-06-12 RX ORDER — PROMETHAZINE HYDROCHLORIDE 25 MG/1
12.5-25 TABLET ORAL EVERY 4 HOURS PRN
Status: DISCONTINUED | OUTPATIENT
Start: 2024-06-12 | End: 2024-06-18 | Stop reason: HOSPADM

## 2024-06-12 RX ORDER — SODIUM CHLORIDE 9 MG/ML
INJECTION, SOLUTION INTRAVENOUS CONTINUOUS
Status: DISCONTINUED | OUTPATIENT
Start: 2024-06-12 | End: 2024-06-18 | Stop reason: HOSPADM

## 2024-06-12 RX ORDER — LIDOCAINE 4 G/G
2 PATCH TOPICAL EVERY 24 HOURS
Status: DISCONTINUED | OUTPATIENT
Start: 2024-06-13 | End: 2024-06-18 | Stop reason: HOSPADM

## 2024-06-12 RX ORDER — ONDANSETRON 2 MG/ML
4 INJECTION INTRAMUSCULAR; INTRAVENOUS EVERY 4 HOURS PRN
Status: DISCONTINUED | OUTPATIENT
Start: 2024-06-12 | End: 2024-06-18 | Stop reason: HOSPADM

## 2024-06-12 RX ORDER — IPRATROPIUM BROMIDE AND ALBUTEROL SULFATE 2.5; .5 MG/3ML; MG/3ML
3 SOLUTION RESPIRATORY (INHALATION) EVERY 4 HOURS
COMMUNITY

## 2024-06-12 RX ORDER — POLYETHYLENE GLYCOL 3350 17 G/17G
1 POWDER, FOR SOLUTION ORAL
Status: DISCONTINUED | OUTPATIENT
Start: 2024-06-12 | End: 2024-06-18 | Stop reason: HOSPADM

## 2024-06-12 RX ADMIN — IPRATROPIUM BROMIDE AND ALBUTEROL SULFATE 3 ML: 2.5; .5 SOLUTION RESPIRATORY (INHALATION) at 20:16

## 2024-06-12 RX ADMIN — PANTOPRAZOLE SODIUM 40 MG: 40 INJECTION, POWDER, FOR SOLUTION INTRAVENOUS at 16:02

## 2024-06-12 RX ADMIN — CALCIUM CHLORIDE 1000 MG: 100 INJECTION, SOLUTION INTRAVENOUS at 16:32

## 2024-06-12 RX ADMIN — SENNOSIDES AND DOCUSATE SODIUM 2 TABLET: 50; 8.6 TABLET ORAL at 19:37

## 2024-06-12 RX ADMIN — OXYCODONE HYDROCHLORIDE 10 MG: 10 TABLET ORAL at 19:37

## 2024-06-12 RX ADMIN — AMPICILLIN AND SULBACTAM 3 G: 1; 2 INJECTION, POWDER, FOR SOLUTION INTRAMUSCULAR; INTRAVENOUS at 20:08

## 2024-06-12 RX ADMIN — SODIUM CHLORIDE: 9 INJECTION, SOLUTION INTRAVENOUS at 19:05

## 2024-06-12 RX ADMIN — HYDROMORPHONE HYDROCHLORIDE 0.5 MG: 1 INJECTION, SOLUTION INTRAMUSCULAR; INTRAVENOUS; SUBCUTANEOUS at 20:58

## 2024-06-12 RX ADMIN — INSULIN GLARGINE-YFGN 15 UNITS: 100 INJECTION, SOLUTION SUBCUTANEOUS at 19:42

## 2024-06-12 RX ADMIN — PANTOPRAZOLE SODIUM 40 MG: 40 INJECTION, POWDER, FOR SOLUTION INTRAVENOUS at 19:38

## 2024-06-12 ASSESSMENT — LIFESTYLE VARIABLES
HAVE YOU EVER FELT YOU SHOULD CUT DOWN ON YOUR DRINKING: NO
ON A TYPICAL DAY WHEN YOU DRINK ALCOHOL HOW MANY DRINKS DO YOU HAVE: 0
CONSUMPTION TOTAL: NEGATIVE
HOW MANY TIMES IN THE PAST YEAR HAVE YOU HAD 5 OR MORE DRINKS IN A DAY: 0
TOTAL SCORE: 0
ALCOHOL_USE: NO
TOTAL SCORE: 0
TOTAL SCORE: 0
EVER FELT BAD OR GUILTY ABOUT YOUR DRINKING: NO
HAVE PEOPLE ANNOYED YOU BY CRITICIZING YOUR DRINKING: NO
AVERAGE NUMBER OF DAYS PER WEEK YOU HAVE A DRINK CONTAINING ALCOHOL: 0
EVER HAD A DRINK FIRST THING IN THE MORNING TO STEADY YOUR NERVES TO GET RID OF A HANGOVER: NO

## 2024-06-12 ASSESSMENT — PAIN DESCRIPTION - PAIN TYPE
TYPE: ACUTE PAIN;CHRONIC PAIN
TYPE: ACUTE PAIN;CHRONIC PAIN
TYPE: CHRONIC PAIN;ACUTE PAIN
TYPE: ACUTE PAIN;CHRONIC PAIN

## 2024-06-12 ASSESSMENT — FIBROSIS 4 INDEX
FIB4 SCORE: 10.16
FIB4 SCORE: 1.16

## 2024-06-12 ASSESSMENT — COPD QUESTIONNAIRES
DO YOU EVER COUGH UP ANY MUCUS OR PHLEGM?: NO/ONLY WITH OCCASIONAL COLDS OR INFECTIONS
COPD SCREENING SCORE: 3
HAVE YOU SMOKED AT LEAST 100 CIGARETTES IN YOUR ENTIRE LIFE: YES
DURING THE PAST 4 WEEKS HOW MUCH DID YOU FEEL SHORT OF BREATH: NONE/LITTLE OF THE TIME

## 2024-06-12 ASSESSMENT — SOCIAL DETERMINANTS OF HEALTH (SDOH)
WITHIN THE PAST 12 MONTHS, YOU WORRIED THAT YOUR FOOD WOULD RUN OUT BEFORE YOU GOT THE MONEY TO BUY MORE: SOMETIMES TRUE
WITHIN THE LAST YEAR, HAVE YOU BEEN KICKED, HIT, SLAPPED, OR OTHERWISE PHYSICALLY HURT BY YOUR PARTNER OR EX-PARTNER?: NO
WITHIN THE PAST 12 MONTHS, THE FOOD YOU BOUGHT JUST DIDN'T LAST AND YOU DIDN'T HAVE MONEY TO GET MORE: SOMETIMES TRUE
WITHIN THE LAST YEAR, HAVE YOU BEEN HUMILIATED OR EMOTIONALLY ABUSED IN OTHER WAYS BY YOUR PARTNER OR EX-PARTNER?: NO
WITHIN THE LAST YEAR, HAVE TO BEEN RAPED OR FORCED TO HAVE ANY KIND OF SEXUAL ACTIVITY BY YOUR PARTNER OR EX-PARTNER?: NO
IN THE PAST 12 MONTHS, HAS THE ELECTRIC, GAS, OIL, OR WATER COMPANY THREATENED TO SHUT OFF SERVICE IN YOUR HOME?: NO
WITHIN THE LAST YEAR, HAVE YOU BEEN AFRAID OF YOUR PARTNER OR EX-PARTNER?: NO

## 2024-06-12 NOTE — ED TRIAGE NOTES
59 yr old male arrived via EMS from [facility].  Per report at 2030 (6/11/24) pt BP 88/52.  Pt was started on NS at 0300.  Pt received approx 700mls.  Pt became hypoxic when baseline of 4L NC was removed.      Per EMS FS

## 2024-06-12 NOTE — ED PROVIDER NOTES
ED Provider Note    CHIEF COMPLAINT  Chief Complaint   Patient presents with    Hypotension     Hypoxia when pt removed oxygen (per report)       EXTERNAL RECORDS REVIEWED  Inpatient Notes admitted twice within the last month for altered mental status hypoxia hypoglycemia concern for cauda equina syndrome after cervical and lumbar decompressive surgeries.    HPI/ROS  LIMITATION TO HISTORY   Select: : None  OUTSIDE HISTORIAN(S):      Christoph Taylor is a 59 y.o. male who presents to the emergency department with chief complaint of hypotension.  Sent here from his care facility for hypotension and hypoxia last night and this morning.  Patient recently had cervical and lumbar decompressive surgeries on cervical decompression on 6/4/2024 and lumbar decompressive surgery on 6/7/2024.  Patient's been admitted to the hospital several times since then.  Returns today with this chief complaint of weakness hypoxia and somewhat altered mental status.  Patient was found to be borderline febrile here at 100.0 but does not report fevers at care facility he reports normal bowel movements no nausea no vomiting no problems with urination moderate pain in surgical sites has been controlled with medicines.      PAST MEDICAL HISTORY   has a past medical history of Abnormal electrocardiogram (ECG) (EKG) (11/8/2021), KRISTAL (acute kidney injury) ([facility]) (11/8/2021), Chest pain in adult (11/8/2021), CKD (chronic kidney disease) stage 3, GFR 30-59 ml/min (11/7/2021), Diabetes ([facility]), Diabetic ketoacidosis without coma associated with type 2 diabetes mellitus ([facility]) (11/7/2021), Diarrhea (3/18/2022), DKA, type 1, not at goal ([facility]) (7/28/2022), Esophagitis (7/28/2022), [place] (hard of hearing), and Hypercholesteremia.    SURGICAL HISTORY   has a past surgical history that includes other; upper gi endoscopy,diagnosis (N/A, 3/14/2023); finger or hand incision and drainage (Right, 6/12/2023); cervical disk and fusion anterior (N/A, 6/4/2024); fusion,  spine, lumbar, plif (N/A, 6/7/2024); and lumbar laminectomy diskectomy (N/A, 6/7/2024).    FAMILY HISTORY  Family History   Problem Relation Age of Onset    Heart Disease Father        SOCIAL HISTORY  Social History     Tobacco Use    Smoking status: Former    Smokeless tobacco: Never   Vaping Use    Vaping status: Not on file   Substance and Sexual Activity    Alcohol use: Not Currently    Drug use: Not Currently    Sexual activity: Not on file       CURRENT MEDICATIONS  Home Medications       Reviewed by Patricia Suarez R.N. (Registered Nurse) on 06/12/24 at 1304  Med List Status: <None>     Medication Last Dose Status   acetaminophen (TYLENOL) 325 MG Tab  Active   aspirin 81 MG EC tablet  Active   bisacodyl (DULCOLAX) 10 MG Suppos  Active   cyanocobalamin (VITAMIN B12) 1000 MCG Tab  Active   diphenhydrAMINE (BENADRYL) 25 MG Tab  Active   docusate sodium 100 MG Cap  Active   enoxaparin (LOVENOX) 40 MG/0.4ML Solution Prefilled Syringe inj  Active   insulin GLARGINE (LANTUS,SEMGLEE) 100 UNIT/ML Solution  Active   insulin regular (HUMULIN R) 100 Unit/mL Solution  Active   lidocaine (ASPERFLEX) 4 % Patch  Active   magnesium hydroxide (MILK OF MAGNESIA) 400 MG/5ML Suspension  Active   melatonin 5 mg Tab  Active   methocarbamol (ROBAXIN) 500 MG Tab  Active   metoclopramide (REGLAN) 10 MG Tab  Active   multivitamin Tab  Active   omeprazole (PRILOSEC) 20 MG delayed-release capsule  Active   ondansetron (ZOFRAN ODT) 4 MG TABLET DISPERSIBLE  Active   oxyCODONE immediate release (ROXICODONE) 10 MG immediate release tablet  Active   oxyCODONE immediate-release (ROXICODONE) 5 MG Tab  Active   senna-docusate (PERICOLACE OR SENOKOT S) 8.6-50 MG Tab  Active   venlafaxine XR (EFFEXOR XR) 75 MG CAPSULE SR 24 HR  Active   vitamin D3 2000 UNIT Tab  Active                    ALLERGIES  No Known Allergies    PHYSICAL EXAM  VITAL SIGNS: /58   Pulse 93   Temp 37.8 °C (100 °F) (Temporal)   Resp 14   Ht 1.829 m (6')   Wt 73.5  kg (162 lb 0.6 oz)   SpO2 95%   BMI 21.98 kg/m²      Pulse ox interpretation: I interpret this pulse ox as normal.  Constitutional: Intermittently somewhat disoriented in minor distress  HEENT: Atraumatic normocephalic, pupils are equal round reactive to light extraocular movements are intact.  Sclera are pale the nares is clear, external ears are normal, mouth shows dry mucous membranes normal dentition for age  Neck: Supple, no JVD no tracheal deviation  Cardiovascular: Tachycardic no murmur rub or gallop 2+ pulses peripherally x4  Thorax & Lungs: No respiratory distress, no wheezes rales or rhonchi, No chest tenderness.   GI: Soft nontender nondistended positive bowel sounds, no peritoneal signs  Rectal: Normal tone fecal occult faintly positive  Skin: Pale, surgical sites the anterior neck as well as the lower back are clean dry and intact no erythema warmth drainage no bleeding  Musculoskeletal: Moving all extremities with full range and 5 of 5 strength no acute  deformity  Neurologic: Cranial nerves III through XII are grossly intact no sensory deficit no cerebellar dysfunction   Psychiatric: Appropriate affect for situation at this time      EKG/LABS  Results for orders placed or performed during the hospital encounter of 06/12/24   COD - Adult (Type and Screen)   Result Value Ref Range    ABO Grouping Only A     Rh Grouping Only NEG     Antibody Screen-Cod NEG     Component R       R99                 Red Cells, LR       [id number]   transfused   06/12/24   15:55      Product Type R99     Dispense Status transfused     Unit Number (Barcoded) [id number]     Product Code (Barcoded) E8701E36     Blood Type (Barcoded) 0600    Lactic Acid   Result Value Ref Range    Lactic Acid 0.9 0.5 - 2.0 mmol/L   CBC with Differential   Result Value Ref Range    WBC 7.7 4.8 - 10.8 K/uL    RBC 2.72 (L) 4.70 - 6.10 M/uL    Hemoglobin 7.6 (L) 14.0 - 18.0 g/dL    Hematocrit 24.0 (L) 42.0 - 52.0 %    MCV 88.2 81.4 - 97.8  fL    MCH 27.9 27.0 - 33.0 pg    MCHC 31.7 (L) 32.3 - 36.5 g/dL    RDW 43.6 35.9 - 50.0 fL    Platelet Count 208 164 - 446 K/uL    MPV 9.0 9.0 - 12.9 fL    Neutrophils-Polys 87.40 (H) 44.00 - 72.00 %    Lymphocytes 4.40 (L) 22.00 - 41.00 %    Monocytes 7.60 0.00 - 13.40 %    Eosinophils 0.10 0.00 - 6.90 %    Basophils 0.10 0.00 - 1.80 %    Immature Granulocytes 0.40 0.00 - 0.90 %    Nucleated RBC 0.00 0.00 - 0.20 /100 WBC    Neutrophils (Absolute) 6.69 1.82 - 7.42 K/uL    Lymphs (Absolute) 0.34 (L) 1.00 - 4.80 K/uL    Monos (Absolute) 0.58 0.00 - 0.85 K/uL    Eos (Absolute) 0.01 0.00 - 0.51 K/uL    Baso (Absolute) 0.01 0.00 - 0.12 K/uL    Immature Granulocytes (abs) 0.03 0.00 - 0.11 K/uL    NRBC (Absolute) 0.00 K/uL   Complete Metabolic Panel   Result Value Ref Range    Sodium 138 135 - 145 mmol/L    Potassium 4.1 3.6 - 5.5 mmol/L    Chloride 99 96 - 112 mmol/L    Co2 28 20 - 33 mmol/L    Anion Gap 11.0 7.0 - 16.0    Glucose 177 (H) 65 - 99 mg/dL    Bun 29 (H) 8 - 22 mg/dL    Creatinine 1.13 0.50 - 1.40 mg/dL    Calcium 8.3 (L) 8.5 - 10.5 mg/dL    Correct Calcium 9.4 8.5 - 10.5 mg/dL    AST(SGOT) 628 (H) 12 - 45 U/L    ALT(SGPT) 318 (H) 2 - 50 U/L    Alkaline Phosphatase 806 (H) 30 - 99 U/L    Total Bilirubin 0.2 0.1 - 1.5 mg/dL    Albumin 2.6 (L) 3.2 - 4.9 g/dL    Total Protein 5.9 (L) 6.0 - 8.2 g/dL    Globulin 3.3 1.9 - 3.5 g/dL    A-G Ratio 0.8 g/dL   ABO Rh Confirm   Result Value Ref Range    ABO Rh Confirm A NEG    ESTIMATED GFR   Result Value Ref Range    GFR (CKD-EPI) 74 >60 mL/min/1.73 m 2   DIAGNOSTIC ALCOHOL   Result Value Ref Range    Diagnostic Alcohol <10.1 <10.1 mg/dL   HGB (Hemoglobin) for 48 hours   Result Value Ref Range    Hemoglobin 9.1 (L) 14.0 - 18.0 g/dL   MAGNESIUM   Result Value Ref Range    Magnesium 2.0 1.5 - 2.5 mg/dL      I have independently interpreted this EKG    RADIOLOGY/PROCEDURES   I have independently interpreted the diagnostic imaging associated with this visit and am waiting  the final reading from the radiologist.   My preliminary interpretation is as follows: No obvious focal consolidation    Radiologist interpretation:  DX-CHEST-PORTABLE (1 VIEW)   Final Result      1.  Low lung volumes with left greater than right base airspace disease concerning for multifocal pneumonia.      US-RUQ    (Results Pending)       COURSE & MEDICAL DECISION MAKING    ASSESSMENT, COURSE AND PLAN  Care Narrative: 59-year-old male just recently here for spinal decompression both cervical and lumbar went through rehab mom then went home.  Returns today with hypotension and hypoxia at home.  Patient found to be quite anemic in comparison to previous labs with ongoing hypotension therefore transfused 1 unit PRBCs.  Patient was also Hemoccult positive faintly on rectal exam therefore initiated on Protonix.  Patient found to have gross elevation of liver transaminases over values from 2 days prior with AST in the 600s ALT in the 300s also grossly elevated alk phos.  Alkaline phosphatase is likely secondary to his operative intervention however the AST and ALT are difficult to explain at this time.  Patient denies any alcohol use and denies any heavy use of acetaminophen.  This point patient will require ongoing evaluation and treatment of discussed case with hospitalist Dr. Barillas and the patient is admitted in guarded condition for ongoing management.    CRITICAL CARE  The very real possibilty of a deterioration of this patient's condition required the highest level of my preparedness for sudden, emergent intervention.  I provided critical care services, which included medication orders, frequent reevaluations of the patient's condition and response to treatment, ordering and reviewing test results, and discussing the case with various consultants.  The critical care time associated with the care of the patient was 35 minutes. Review chart for interventions. This time is exclusive of any other billable  procedures.         FINAL DIAGNOSIS  1. Hypotension, unspecified hypotension type Active   2. Upper GI bleed Active   3. Transaminitis Active   4.  Acute blood loss anemia  5.  Critical care 35 minutes       Electronically signed by: Abhijit Hernandez M.D.,

## 2024-06-12 NOTE — PROGRESS NOTES
Diabetes education: Text sent to RN asking if pt has any questions or education needs ( he had denied needs at when seen earlier in May). Pt states no questions or education needs.

## 2024-06-12 NOTE — PROGRESS NOTES
1630 Pt A&Ox4, VSS, on 2.5 L NC. Discharge education provided. Discussed follow-up appointments and prescriptions. All questions answered. Pt discharged to [facility]. Pt left with all belongings. IV removed. Discharge AVS and controlled substance form signed. Pt transferred to wheelchair and left unit with transporter. COBRA packet with scripts in packet given to transporter.

## 2024-06-12 NOTE — ED NOTES
"Pt dozing intermittently, arouses to name.  Pt denies that ERP spoke to him about blood transfusion.  \"I dont want it\"  when questioned further, pt states \"I want something to eat and drink and want to discuss it with my family.  I dont want it (transfusion) right now, I'll do it later\"  Dr Hernandez notified.   "

## 2024-06-12 NOTE — ED NOTES
ERP discussed with pt risks and benefits of blood transfusion.  Pt signed consent for blood transfusion.  Pt to be given ice water (per request) and sandwich.

## 2024-06-13 ENCOUNTER — APPOINTMENT (OUTPATIENT)
Dept: RADIOLOGY | Facility: MEDICAL CENTER | Age: 59
DRG: 193 | End: 2024-06-13
Attending: HOSPITALIST
Payer: COMMERCIAL

## 2024-06-13 ENCOUNTER — APPOINTMENT (OUTPATIENT)
Dept: RADIOLOGY | Facility: MEDICAL CENTER | Age: 59
DRG: 193 | End: 2024-06-13
Attending: STUDENT IN AN ORGANIZED HEALTH CARE EDUCATION/TRAINING PROGRAM
Payer: COMMERCIAL

## 2024-06-13 PROBLEM — E16.2 HYPOGLYCEMIA: Status: ACTIVE | Noted: 2024-06-13

## 2024-06-13 PROBLEM — R79.89 ELEVATED LIVER FUNCTION TESTS: Status: ACTIVE | Noted: 2024-06-13

## 2024-06-13 LAB
ALBUMIN SERPL BCP-MCNC: 2.8 G/DL (ref 3.2–4.9)
ALBUMIN/GLOB SERPL: 0.9 G/DL
ALP SERPL-CCNC: 659 U/L (ref 30–99)
ALT SERPL-CCNC: 246 U/L (ref 2–50)
ANION GAP SERPL CALC-SCNC: 11 MMOL/L (ref 7–16)
AST SERPL-CCNC: 309 U/L (ref 12–45)
BILIRUB SERPL-MCNC: 0.4 MG/DL (ref 0.1–1.5)
BUN SERPL-MCNC: 21 MG/DL (ref 8–22)
CALCIUM ALBUM COR SERPL-MCNC: 9.6 MG/DL (ref 8.5–10.5)
CALCIUM SERPL-MCNC: 8.6 MG/DL (ref 8.5–10.5)
CHLORIDE SERPL-SCNC: 102 MMOL/L (ref 96–112)
CO2 SERPL-SCNC: 29 MMOL/L (ref 20–33)
CREAT SERPL-MCNC: 0.74 MG/DL (ref 0.5–1.4)
ERYTHROCYTE [DISTWIDTH] IN BLOOD BY AUTOMATED COUNT: 42 FL (ref 35.9–50)
GFR SERPLBLD CREATININE-BSD FMLA CKD-EPI: 104 ML/MIN/1.73 M 2
GLOBULIN SER CALC-MCNC: 3.2 G/DL (ref 1.9–3.5)
GLUCOSE BLD STRIP.AUTO-MCNC: 103 MG/DL (ref 65–99)
GLUCOSE BLD STRIP.AUTO-MCNC: 122 MG/DL (ref 65–99)
GLUCOSE BLD STRIP.AUTO-MCNC: 124 MG/DL (ref 65–99)
GLUCOSE BLD STRIP.AUTO-MCNC: 186 MG/DL (ref 65–99)
GLUCOSE BLD STRIP.AUTO-MCNC: 254 MG/DL (ref 65–99)
GLUCOSE BLD STRIP.AUTO-MCNC: 49 MG/DL (ref 65–99)
GLUCOSE BLD STRIP.AUTO-MCNC: 62 MG/DL (ref 65–99)
GLUCOSE SERPL-MCNC: 145 MG/DL (ref 65–99)
HCT VFR BLD AUTO: 27.7 % (ref 42–52)
HGB BLD-MCNC: 9.1 G/DL (ref 14–18)
HGB BLD-MCNC: 9.5 G/DL (ref 14–18)
HGB BLD-MCNC: 9.8 G/DL (ref 14–18)
MCH RBC QN AUTO: 28.5 PG (ref 27–33)
MCHC RBC AUTO-ENTMCNC: 32.9 G/DL (ref 32.3–36.5)
MCV RBC AUTO: 86.8 FL (ref 81.4–97.8)
PLATELET # BLD AUTO: 230 K/UL (ref 164–446)
PMV BLD AUTO: 8.9 FL (ref 9–12.9)
POTASSIUM SERPL-SCNC: 3.8 MMOL/L (ref 3.6–5.5)
PROCALCITONIN SERPL-MCNC: 0.91 NG/ML
PROT SERPL-MCNC: 6 G/DL (ref 6–8.2)
RBC # BLD AUTO: 3.19 M/UL (ref 4.7–6.1)
SODIUM SERPL-SCNC: 142 MMOL/L (ref 135–145)
WBC # BLD AUTO: 7.1 K/UL (ref 4.8–10.8)

## 2024-06-13 PROCEDURE — 85027 COMPLETE CBC AUTOMATED: CPT

## 2024-06-13 PROCEDURE — 96376 TX/PRO/DX INJ SAME DRUG ADON: CPT

## 2024-06-13 PROCEDURE — 36415 COLL VENOUS BLD VENIPUNCTURE: CPT

## 2024-06-13 PROCEDURE — 80053 COMPREHEN METABOLIC PANEL: CPT

## 2024-06-13 PROCEDURE — 92610 EVALUATE SWALLOWING FUNCTION: CPT

## 2024-06-13 PROCEDURE — 94669 MECHANICAL CHEST WALL OSCILL: CPT

## 2024-06-13 PROCEDURE — 700111 HCHG RX REV CODE 636 W/ 250 OVERRIDE (IP): Mod: JZ | Performed by: STUDENT IN AN ORGANIZED HEALTH CARE EDUCATION/TRAINING PROGRAM

## 2024-06-13 PROCEDURE — 96367 TX/PROPH/DG ADDL SEQ IV INF: CPT

## 2024-06-13 PROCEDURE — C9113 INJ PANTOPRAZOLE SODIUM, VIA: HCPCS | Performed by: STUDENT IN AN ORGANIZED HEALTH CARE EDUCATION/TRAINING PROGRAM

## 2024-06-13 PROCEDURE — 82962 GLUCOSE BLOOD TEST: CPT | Mod: 91

## 2024-06-13 PROCEDURE — 76705 ECHO EXAM OF ABDOMEN: CPT

## 2024-06-13 PROCEDURE — 700102 HCHG RX REV CODE 250 W/ 637 OVERRIDE(OP): Performed by: HOSPITALIST

## 2024-06-13 PROCEDURE — 84145 PROCALCITONIN (PCT): CPT

## 2024-06-13 PROCEDURE — 700101 HCHG RX REV CODE 250: Performed by: STUDENT IN AN ORGANIZED HEALTH CARE EDUCATION/TRAINING PROGRAM

## 2024-06-13 PROCEDURE — 85018 HEMOGLOBIN: CPT | Mod: 91

## 2024-06-13 PROCEDURE — 99233 SBSQ HOSP IP/OBS HIGH 50: CPT | Performed by: HOSPITALIST

## 2024-06-13 PROCEDURE — 700105 HCHG RX REV CODE 258: Performed by: STUDENT IN AN ORGANIZED HEALTH CARE EDUCATION/TRAINING PROGRAM

## 2024-06-13 PROCEDURE — A9270 NON-COVERED ITEM OR SERVICE: HCPCS | Performed by: STUDENT IN AN ORGANIZED HEALTH CARE EDUCATION/TRAINING PROGRAM

## 2024-06-13 PROCEDURE — G0378 HOSPITAL OBSERVATION PER HR: HCPCS

## 2024-06-13 PROCEDURE — 74230 X-RAY XM SWLNG FUNCJ C+: CPT

## 2024-06-13 PROCEDURE — 96375 TX/PRO/DX INJ NEW DRUG ADDON: CPT

## 2024-06-13 PROCEDURE — 94640 AIRWAY INHALATION TREATMENT: CPT

## 2024-06-13 PROCEDURE — 96372 THER/PROPH/DIAG INJ SC/IM: CPT

## 2024-06-13 PROCEDURE — 700102 HCHG RX REV CODE 250 W/ 637 OVERRIDE(OP): Performed by: STUDENT IN AN ORGANIZED HEALTH CARE EDUCATION/TRAINING PROGRAM

## 2024-06-13 PROCEDURE — 92611 MOTION FLUOROSCOPY/SWALLOW: CPT

## 2024-06-13 PROCEDURE — 700105 HCHG RX REV CODE 258

## 2024-06-13 RX ORDER — IPRATROPIUM BROMIDE AND ALBUTEROL SULFATE 2.5; .5 MG/3ML; MG/3ML
3 SOLUTION RESPIRATORY (INHALATION)
Status: DISCONTINUED | OUTPATIENT
Start: 2024-06-13 | End: 2024-06-18 | Stop reason: HOSPADM

## 2024-06-13 RX ORDER — DEXTROSE MONOHYDRATE 100 MG/ML
INJECTION, SOLUTION INTRAVENOUS
Status: COMPLETED
Start: 2024-06-13 | End: 2024-06-13

## 2024-06-13 RX ADMIN — METHOCARBAMOL 500 MG: 500 TABLET ORAL at 13:07

## 2024-06-13 RX ADMIN — PANTOPRAZOLE SODIUM 40 MG: 40 INJECTION, POWDER, FOR SOLUTION INTRAVENOUS at 17:34

## 2024-06-13 RX ADMIN — HYDROMORPHONE HYDROCHLORIDE 0.5 MG: 1 INJECTION, SOLUTION INTRAMUSCULAR; INTRAVENOUS; SUBCUTANEOUS at 23:37

## 2024-06-13 RX ADMIN — AMPICILLIN AND SULBACTAM 3 G: 1; 2 INJECTION, POWDER, FOR SOLUTION INTRAMUSCULAR; INTRAVENOUS at 12:07

## 2024-06-13 RX ADMIN — AMPICILLIN AND SULBACTAM 3 G: 1; 2 INJECTION, POWDER, FOR SOLUTION INTRAMUSCULAR; INTRAVENOUS at 23:36

## 2024-06-13 RX ADMIN — IPRATROPIUM BROMIDE AND ALBUTEROL SULFATE 3 ML: 2.5; .5 SOLUTION RESPIRATORY (INHALATION) at 08:41

## 2024-06-13 RX ADMIN — INSULIN HUMAN 1 UNITS: 100 INJECTION, SOLUTION PARENTERAL at 17:23

## 2024-06-13 RX ADMIN — AMPICILLIN AND SULBACTAM 3 G: 1; 2 INJECTION, POWDER, FOR SOLUTION INTRAMUSCULAR; INTRAVENOUS at 17:36

## 2024-06-13 RX ADMIN — INSULIN GLARGINE-YFGN 10 UNITS: 100 INJECTION, SOLUTION SUBCUTANEOUS at 17:20

## 2024-06-13 RX ADMIN — HYDROMORPHONE HYDROCHLORIDE 0.5 MG: 1 INJECTION, SOLUTION INTRAMUSCULAR; INTRAVENOUS; SUBCUTANEOUS at 15:36

## 2024-06-13 RX ADMIN — OXYCODONE HYDROCHLORIDE 10 MG: 10 TABLET ORAL at 18:41

## 2024-06-13 RX ADMIN — INSULIN HUMAN 3 UNITS: 100 INJECTION, SOLUTION PARENTERAL at 12:15

## 2024-06-13 RX ADMIN — HYDROMORPHONE HYDROCHLORIDE 0.5 MG: 1 INJECTION, SOLUTION INTRAMUSCULAR; INTRAVENOUS; SUBCUTANEOUS at 03:36

## 2024-06-13 RX ADMIN — AMPICILLIN AND SULBACTAM 3 G: 1; 2 INJECTION, POWDER, FOR SOLUTION INTRAMUSCULAR; INTRAVENOUS at 06:48

## 2024-06-13 RX ADMIN — OXYCODONE HYDROCHLORIDE 10 MG: 10 TABLET ORAL at 09:46

## 2024-06-13 RX ADMIN — DEXTROSE MONOHYDRATE 250 ML: 100 INJECTION, SOLUTION INTRAVENOUS at 06:27

## 2024-06-13 RX ADMIN — METHOCARBAMOL 500 MG: 500 TABLET ORAL at 23:38

## 2024-06-13 RX ADMIN — PANTOPRAZOLE SODIUM 40 MG: 40 INJECTION, POWDER, FOR SOLUTION INTRAVENOUS at 06:28

## 2024-06-13 RX ADMIN — SODIUM CHLORIDE: 9 INJECTION, SOLUTION INTRAVENOUS at 22:24

## 2024-06-13 RX ADMIN — Medication 5 MG: at 23:37

## 2024-06-13 RX ADMIN — OXYCODONE HYDROCHLORIDE 10 MG: 10 TABLET ORAL at 22:23

## 2024-06-13 RX ADMIN — OXYCODONE HYDROCHLORIDE 10 MG: 10 TABLET ORAL at 14:31

## 2024-06-13 RX ADMIN — HYDROMORPHONE HYDROCHLORIDE 0.5 MG: 1 INJECTION, SOLUTION INTRAMUSCULAR; INTRAVENOUS; SUBCUTANEOUS at 19:52

## 2024-06-13 RX ADMIN — LIDOCAINE 2 PATCH: 4 PATCH TOPICAL at 06:30

## 2024-06-13 RX ADMIN — AMPICILLIN AND SULBACTAM 3 G: 1; 2 INJECTION, POWDER, FOR SOLUTION INTRAMUSCULAR; INTRAVENOUS at 01:16

## 2024-06-13 RX ADMIN — OXYCODONE HYDROCHLORIDE 5 MG: 5 TABLET ORAL at 02:22

## 2024-06-13 RX ADMIN — SODIUM CHLORIDE: 9 INJECTION, SOLUTION INTRAVENOUS at 12:05

## 2024-06-13 ASSESSMENT — COGNITIVE AND FUNCTIONAL STATUS - GENERAL
DAILY ACTIVITIY SCORE: 17
SUGGESTED CMS G CODE MODIFIER MOBILITY: CK
DRESSING REGULAR UPPER BODY CLOTHING: A LOT
HELP NEEDED FOR BATHING: A LOT
SUGGESTED CMS G CODE MODIFIER DAILY ACTIVITY: CK
CLIMB 3 TO 5 STEPS WITH RAILING: A LOT
MOVING TO AND FROM BED TO CHAIR: A LITTLE
WALKING IN HOSPITAL ROOM: A LOT
DRESSING REGULAR LOWER BODY CLOTHING: A LOT
STANDING UP FROM CHAIR USING ARMS: A LITTLE
MOVING FROM LYING ON BACK TO SITTING ON SIDE OF FLAT BED: A LITTLE
TOILETING: A LITTLE
MOBILITY SCORE: 17

## 2024-06-13 ASSESSMENT — PAIN DESCRIPTION - PAIN TYPE
TYPE: ACUTE PAIN
TYPE: ACUTE PAIN;CHRONIC PAIN
TYPE: ACUTE PAIN;CHRONIC PAIN
TYPE: ACUTE PAIN

## 2024-06-13 NOTE — PROGRESS NOTES
4 Eyes Skin Assessment Completed by PENNIE Perdomo and PENNIE Lyons.    Head WDL  Ears WDL  Nose WDL  Mouth WDL  Neck Cervical collar in place   Breast/Chest WDL  Shoulder Blades WDL  Spine Dressing in place staples in lower back   (R) Arm/Elbow/Hand Bruising  (L) Arm/Elbow/Hand Bruising  Abdomen WDL  Groin WDL  Scrotum/Coccyx/Buttocks WDL  (R) Leg WDL  (L) Leg WDL  (R) Heel/Foot/Toe WDL  (L) Heel/Foot/Toe WDL          Devices In Places Pulse Ox      Interventions In Place Pillows    Possible Skin Injury No    Pictures Uploaded Into Epic N/A  Wound Consult Placed N/A  RN Wound Prevention Protocol Ordered No

## 2024-06-13 NOTE — ASSESSMENT & PLAN NOTE
A1C 12.4  Lantus decreased to 10 units q. evening  - Continue sliding scale insulin  - Monitor BG trend.   - Accu-Cheks before meals and at bedtime.   - Goal to keep BG between 140-180 per 2019 ADA guidelines   BGs 100s, A1c 10+

## 2024-06-13 NOTE — H&P
Hospital Medicine History & Physical Note    Date of Service  6/12/2024    Primary Care Physician  YESY Tariq.    Consultants  None at this time    Specialist Names: NA     Code Status  Full Code    Chief Complaint  Chief Complaint   Patient presents with    Hypotension     Hypoxia when pt removed oxygen (per report)       History of Presenting Illness  Kevin Taylor is a 59-year-old male with a PMHx DMT2, GERD with esophagitis, chronic back pain.  Patient presented to the emergency room 6/12 at the request of his [employer] support person.  He states that they called him on the phone and patient did not sound like himself and they requested emergency room visit.      Patient [place] [facility] [place] through 6/11 for quite equina syndrome.  He is S/p C4-C7 ACDF on 6/4 and L4-L5 laminectomies with stealth fusion 6/7. Patient was discharged from [facility] 6/11 to [facility].  It was very unclear from ED notes and from patient if he was at [place] or if he had returned home sometime in the last 24 hours.  He states that his sister had groceries delivered to him at home.  He has intermittently been confused in the emergency room.    Regardless, patient comes in with new elevation of LFTs and anemia.  He received 1 unit RBCs in the emergency room.    I discussed the plan of care with patient and EDP .    Review of Systems  Review of Systems   Unable to perform ROS: Medical condition       Past Medical History   has a past medical history of Abnormal electrocardiogram (ECG) (EKG) (11/8/2021), KRISTAL (acute kidney injury) ([facility]) (11/8/2021), Chest pain in adult (11/8/2021), CKD (chronic kidney disease) stage 3, GFR 30-59 ml/min (11/7/2021), Diabetes ([facility]), Diabetic ketoacidosis without coma associated with type 2 diabetes mellitus ([facility]) (11/7/2021), Diarrhea (3/18/2022), DKA, type 1, not at goal ([facility]) (7/28/2022), Esophagitis (7/28/2022), [place] (hard of hearing), and Hypercholesteremia.    Surgical  History   has a past surgical history that includes other; pr upper gi endoscopy,diagnosis (N/A, 3/14/2023); finger or hand incision and drainage (Right, 6/12/2023); cervical disk and fusion anterior (N/A, 6/4/2024); fusion, spine, lumbar, plif (N/A, 6/7/2024); and lumbar laminectomy diskectomy (N/A, 6/7/2024).     Family History  family history includes Heart Disease in his father.   Family history reviewed with patient. There is no family history that is pertinent to the chief complaint.     Social History   reports that he has quit smoking. He has never used smokeless tobacco. He reports that he does not currently use alcohol. He reports that he does not currently use drugs.    Allergies  No Known Allergies    Medications  Prior to Admission Medications   Prescriptions Last Dose Informant Patient Reported? Taking?   acetaminophen (TYLENOL) 325 MG Tab   No No   Sig: Take 2 Tablets by mouth every 6 hours as needed for Mild Pain, Moderate Pain or Fever.   aspirin 81 MG EC tablet  Patient Yes No   Sig: Take 81 mg by mouth every evening.   bisacodyl (DULCOLAX) 10 MG Suppos   No No   Sig: Insert 1 Suppository into the rectum every 24 hours as needed (if sennosides, docusate, polyethylene glycol 3350, and/or magnesium hydroxide ineffective or not ordered).   cyanocobalamin (VITAMIN B12) 1000 MCG Tab   No No   Sig: Take 1 Tablet by mouth every day.   diphenhydrAMINE (BENADRYL) 25 MG Tab   No No   Sig: Take 1 Tablet by mouth every 6 hours as needed for Itching.   docusate sodium 100 MG Cap   No No   Sig: Take 100 mg by mouth 2 times a day.   insulin GLARGINE (LANTUS,SEMGLEE) 100 UNIT/ML Solution   No No   Sig: Inject 15 Units under the skin 2 times a day.   insulin regular (HUMULIN R) 100 Unit/mL Solution   No No   Sig: Inject 2-9 Units under the skin 4 Times a Day,Before Meals and at Bedtime.   lidocaine (ASPERFLEX) 4 % Patch   No No   Sig: Place 2 Patches on the skin every 24 hours.   magnesium hydroxide (MILK OF  MAGNESIA) 400 MG/5ML Suspension   No No   Sig: Take 30 mL by mouth 1 time a day as needed (if sennosides, docusate, and/or polyethylene glycol 3350 ineffective or not ordered).   melatonin 5 mg Tab   No No   Sig: Take 1 Tablet by mouth at bedtime as needed (Sleep).   methocarbamol (ROBAXIN) 500 MG Tab   No No   Sig: Take 1 Tablet by mouth every 8 hours as needed (muscle spasm).   metoclopramide (REGLAN) 10 MG Tab  Patient Yes No   Sig: Take 10 mg by mouth 3 times a day as needed. Indications: Indigestion   multivitamin Tab  Patient Yes No   Sig: Take 1 Tablet by mouth every evening.   omeprazole (PRILOSEC) 20 MG delayed-release capsule   No No   Sig: Take 1 Capsule by mouth 2 times a day.   ondansetron (ZOFRAN ODT) 4 MG TABLET DISPERSIBLE   No No   Sig: Dissolve 1 Tablet by mouth every four hours as needed for Nausea/Vomiting   oxyCODONE immediate release (ROXICODONE) 10 MG immediate release tablet   No No   Sig: Take 1 Tablet by mouth every 3 hours as needed for Moderate Pain for up to 3 days.   oxyCODONE immediate-release (ROXICODONE) 5 MG Tab   No No   Sig: Take 1 Tablet by mouth every 3 hours as needed for Severe Pain for up to 3 days.   senna-docusate (PERICOLACE OR SENOKOT S) 8.6-50 MG Tab   No No   Sig: Take 1 Tablet by mouth every evening.   venlafaxine XR (EFFEXOR XR) 75 MG CAPSULE SR 24 HR   No No   Sig: Take 1 Capsule by mouth every morning with breakfast.   vitamin D3 2000 UNIT Tab   No No   Sig: Take 1 Tablet by mouth every day.      Facility-Administered Medications: None       Physical Exam  Temp:  [36.3 °C (97.3 °F)-37.8 °C (100 °F)] 36.8 °C (98.2 °F)  Pulse:  [] 89  Resp:  [12-18] 16  BP: (105-172)/() 140/76  SpO2:  [95 %-99 %] 95 %  Blood Pressure: (!) 160/89   Temperature: 36.3 °C (97.4 °F)   Pulse: 97   Respiration: 12   Pulse Oximetry: 98 %       Physical Exam  Vitals and nursing note reviewed.   Constitutional:       General: He is not in acute distress.     Appearance: He is  "ill-appearing.   HENT:      Head: Normocephalic.      Comments: Aspen c-collar in place     Mouth/Throat:      Mouth: Mucous membranes are moist.      Pharynx: Oropharynx is clear.   Neck:      Comments: Decreased range of motion due to Aspen collar  Cardiovascular:      Rate and Rhythm: Normal rate and regular rhythm.      Heart sounds: Murmur heard.   Pulmonary:      Effort: Pulmonary effort is normal. No respiratory distress.      Breath sounds: Normal breath sounds. No wheezing.   Abdominal:      General: Abdomen is flat. Bowel sounds are normal. There is no distension.      Palpations: Abdomen is soft.      Tenderness: There is no abdominal tenderness.   Musculoskeletal:         General: No swelling.   Skin:     General: Skin is warm and dry.   Neurological:      Mental Status: He is alert and oriented to person, place, and time.      Motor: Weakness present.   Psychiatric:         Mood and Affect: Mood normal.         Behavior: Behavior normal.         Laboratory:  Recent Labs     06/10/24  0758 06/11/24  0740 06/12/24  1350 06/12/24  1845   WBC 7.6 7.8 7.7  --    RBC 3.13* 3.15* 2.72*  --    HEMOGLOBIN 8.7* 8.7* 7.6* 9.1*   HEMATOCRIT 27.2* 27.7* 24.0*  --    MCV 86.9 87.9 88.2  --    MCH 27.8 27.6 27.9  --    MCHC 32.0* 31.4* 31.7*  --    RDW 42.5 43.1 43.6  --    PLATELETCT 183 189 208  --    MPV 9.2 8.9* 9.0  --      Recent Labs     06/12/24  1350   SODIUM 138   POTASSIUM 4.1   CHLORIDE 99   CO2 28   GLUCOSE 177*   BUN 29*   CREATININE 1.13   CALCIUM 8.3*     Recent Labs     06/12/24  1350   ALTSGPT 318*   ASTSGOT 628*   ALKPHOSPHAT 806*   TBILIRUBIN 0.2   GLUCOSE 177*         No results for input(s): \"NTPROBNP\" in the last 72 hours.      No results for input(s): \"TROPONINT\" in the last 72 hours.    Imaging:  DX-CHEST-PORTABLE (1 VIEW)   Final Result      1.  Low lung volumes with left greater than right base airspace disease concerning for multifocal pneumonia.      US-RUQ    (Results Pending) "       X-Ray:  I have personally reviewed the images and compared with prior images.    Assessment/Plan:  Justification for Admission Status  I anticipate this patient will require at least two midnights for appropriate medical management, necessitating inpatient admission because anemia, IV antibiotics for pna     Patient will need a Med/Surg bed on ORTHOPEDICS service .  The need is secondary to IV antibiotics and blood transfusion.    * Acute on chronic anemia- (present on admission)  Assessment & Plan  Hb previously downtrending though stable at time of discharge at 8.7  Hb on admission 7.6 --> 9.1  S/p 1 unit RBCs in ED  - Trend H&H  - Consider GI referral if Hb continues to downtrend  - Patient has a history of gastritis and esophagitis  - IV pantoprazole twice daily    Transaminitis  Assessment & Plan  Lab work from 6/8 showing normal LFT  Admission AST: : 318; ALP elevated 806  Total bili 0.2  CT C/A/P 5/27: Liver, gallbladder normal. No CBD dilation noted   - RUQ US pending  - Diagnostic alcohol normal  - Acetaminophen and hepatitis panels pending  - Repeat CMP in a.m.    Anemia  Assessment & Plan  Hb previously downtrending though stable at time of discharge at 8.7  Hb on admission 7.6 --> 9.1  S/p 1 unit RBCs in ED  - Trend H&H  - Consider GI referral if Hb continues to downtrend  - Patient has a history of gastritis and esophagitis  - IV pantoprazole twice daily    Cervical stenosis of spinal canal- (present on admission)  Assessment & Plan  S/p C4-C7 ACDF on 6/4 and L4-L5 laminectomies with stealth fusion 6/7  - Continue Aspen cervical collar  - Consider reconsulting with neurosurgery if indicated    Multifocal pneumonia- (present on admission)  Assessment & Plan  CXR: patchy infiltrates to right middle and right lower lobes.  CT C/A/P 5/27: Patchy bilateral infiltrates, greatest in upper right lobe.  Distal esophageal wall thickening  Completed antibiotic course with ceftriaxone and doxycycline  on 6/2  Likely secondary to aspiration given recent surgeries and hospitalization  - Start IV Unasyn  - Aspiration precautions  - Speech therapy consult    Acute on chronic respiratory failure (HCC)- (present on admission)  Assessment & Plan  Postoperatively during prior admission patient remained on 4 LPM's  Secondary to atelectasis, recent anesthesia and pneumonia  Currently on baseline 4 LPM  - Continue to monitor  - Continuous pulse ox monitoring    Uncontrolled type 2 diabetes mellitus with hyperglycemia (HCC)- (present on admission)  Assessment & Plan  A1C 12.4  - Continue Lantus 15 units BID.    - Continue sliding scale insulin  - Monitor BG trend.   - Accu-Cheks before meals and at bedtime.   - Goal to keep BG between 140-180 per 2019 ADA guidelines         VTE prophylaxis: SCDs/TEDs and pharmacologic prophylaxis contraindicated due to anemia

## 2024-06-13 NOTE — ASSESSMENT & PLAN NOTE
Hb previously downtrending though stable at time of discharge at 8.7  Hb on admission 7.6 --> 9.1 with S/p 1 unit RBCs in ED  - Trend H&H  -  - IV pantoprazole twice daily..  if Hemoglobin remained stable transition to p.o. Prilosec  Recent Labs     06/15/24  0128 06/17/24  0112   HEMOGLOBIN 8.9* 8.2*   HEMATOCRIT 27.0* 25.2*   MCV 86.3 84.8   MCH 28.4 27.6   PLATELETCT 275 309     Stable  Iron panel consistent with chronic disease

## 2024-06-13 NOTE — CARE PLAN
The patient is Watcher - Medium risk of patient condition declining or worsening    Shift Goals  Clinical Goals: pain control, RUQ US  Patient Goals: food  Family Goals: not at the bedside    Progress made toward(s) clinical / shift goals:    Problem: Pain - Standard  Goal: Alleviation of pain or a reduction in pain to the patient’s comfort goal  Description: Target End Date:  6/13/24    1.  Document pain using the appropriate pain scale per order or unit policy  2.  Educate and implement non-pharmacologic comfort measures (i.e. relaxation, distraction, massage, cold/heat therapy, etc.)  3.  Pain management medications as ordered  4.  Reassess pain after pain med administration per policy  5.  If opiods administered assess patient's response to pain medication is appropriate per POSS sedation scale  6.  Follow pain management plan developed in collaboration with patient and interdisciplinary team (including palliative care or pain specialists if applicable)  Outcome: Progressing     Problem: Knowledge Deficit - Standard  Goal: Patient and family/care givers will demonstrate understanding of plan of care, disease process/condition, diagnostic tests and medications  Description: Target End Date:  6/13/24    1.  Patient oriented to unit, equipment, visitation policy and means for communicating concern  2.  Complete/review Learning Assessment  3.  Assess knowledge level of disease process/condition, treatment plan, diagnostic tests and medications  4.  Explain disease process/condition, treatment plan, diagnostic tests and medications  Outcome: Progressing     Problem: Respiratory  Goal: Patient will achieve/maintain optimum respiratory ventilation and gas exchange  Description: Target End Date:  6/13/24    1.  Assess and monitor rate, rhythm, depth and effort of respiration  2.  Breath sounds assessed qshift and/or as needed  3.  Assess O2 saturation, administer/titrate oxygen as ordered  4.  Position patient for  maximum ventilatory efficiency  5.  Turn, cough, and deep breath with splinting to improve effectiveness  6.  Collaborate with RT to administer medication/treatments per order  7.  Encourage use of incentive spirometer and encourage patient to cough after use and utilize splinting techniques if applicable  8.  Airway suctioning  9.  Monitor sputum production for changes in color, consistency and frequency  10. Perform frequent oral hygiene  11. Alternate physical activity with rest periods  Outcome: Progressing

## 2024-06-13 NOTE — PROGRESS NOTES
Pt arrived to unit via gurjuliet at 1835. Pt oriented to room, unit, and plan of care. All questions answered at this time. Call light within reach; fall precautions in place.

## 2024-06-13 NOTE — ASSESSMENT & PLAN NOTE
Hb previously downtrending though stable at time of discharge at 8.7  Hb on admission 7.6 --> 9.1  S/p 1 unit RBCs in ED  - Trend H&H  - Consider GI referral if Hb continues to downtrend  - Patient has a history of gastritis and esophagitis  - IV pantoprazole twice daily

## 2024-06-13 NOTE — PROGRESS NOTES
Received report from Nata CASPER and Katherine RN. Pr alert and oriented x4. Reports pain 7/10. Call light in reach. Bed in lowest position. Fall precautions in place. Fall education provided. Plan of care discussed with pt. Pt agreeing to plan of care. Communication board updated. All questions answered. Assessment completed.

## 2024-06-13 NOTE — PROGRESS NOTES
Hospital Medicine Daily Progress Note    Date of Service  6/13/2024    Chief Complaint  Christoph Taylor is a 59 y.o. male admitted 6/12/2024 with low blood pressure    Hospital Course  Kevin Taylor is a 59-year-old male with a PMHx DMT2, GERD with esophagitis, chronic back pain.  Patient presented to the emergency room 6/12 at the request of his [employer] support person.  He states that they called him on the phone and patient did not sound like himself and they requested emergency room visit.       Patient was admitted [facility] 5/27 through 6/11 for quite equina syndrome.  He is S/p C4-C7 ACDF on 6/4 and L4-L5 laminectomies with stealth fusion 6/7. Patient was discharged from [facility] 6/11 to [facility].  It was very unclear from ED notes and from patient if he was at [place] or if he had returned home sometime in the last 24 hours.  He states that his sister had groceries delivered to him at home.  He has intermittently been confused in the emergency room.     Regardless, patient comes in with new elevation of LFTs and anemia.  He received 1 unit RBCs in the emergency room.       Interval Problem Update  Patient is alert    Patient had 2 episodes of hypoglycemia this a.m.    Was on Lantus 50 units twice daily and I have decreased this to Lantus 10 units every evening      Reviewed x-ray of the lung showing multifocal infiltrates.    Reviewed a procalcitonin level and it is elevated at 0.91    On IV antibiotics IV Unasyn      I have discussed this patient's plan of care and discharge plan at IDT rounds today with Case Management, Nursing, Nursing leadership, and other members of the IDT team.    Consultants/Specialty      Code Status  Full Code    Disposition  The patient is not medically cleared for discharge to home or a post-acute facility.      I have placed the appropriate orders for post-discharge needs.    Review of Systems  Review of Systems   Unable to perform ROS: Mental status  change        Physical Exam  Temp:  [36.3 °C (97.3 °F)-37.8 °C (100 °F)] 36.7 °C (98 °F)  Pulse:  [] 75  Resp:  [12-18] 18  BP: (105-172)/() 159/84  SpO2:  [95 %-99 %] 95 %    Physical Exam  Constitutional:       General: He is not in acute distress.     Appearance: He is ill-appearing. He is not toxic-appearing.   HENT:      Head:      Comments: Neck brace  Eyes:      Extraocular Movements: Extraocular movements intact.      Pupils: Pupils are equal, round, and reactive to light.   Cardiovascular:      Rate and Rhythm: Normal rate and regular rhythm.      Heart sounds: No murmur heard.     No gallop.   Pulmonary:      Effort: No respiratory distress.      Breath sounds: No stridor. No wheezing, rhonchi or rales.   Chest:      Chest wall: No tenderness.   Abdominal:      General: There is no distension.      Palpations: There is no mass.      Tenderness: There is no abdominal tenderness. There is no guarding.      Hernia: No hernia is present.   Musculoskeletal:         General: No swelling, tenderness or deformity.      Cervical back: Normal range of motion.      Right lower leg: No edema.      Left lower leg: No edema.   Skin:     Capillary Refill: Capillary refill takes 2 to 3 seconds.   Neurological:      General: No focal deficit present.      Motor: Weakness present.   Psychiatric:      Comments: Odd affect         Fluids    Intake/Output Summary (Last 24 hours) at 6/13/2024 0931  Last data filed at 6/13/2024 0500  Gross per 24 hour   Intake --   Output 1050 ml   Net -1050 ml       Laboratory  Recent Labs     06/11/24  0740 06/12/24  1350 06/12/24  1845 06/13/24  0211   WBC 7.8 7.7  --  7.1   RBC 3.15* 2.72*  --  3.19*   HEMOGLOBIN 8.7* 7.6* 9.1* 9.1*   HEMATOCRIT 27.7* 24.0*  --  27.7*   MCV 87.9 88.2  --  86.8   MCH 27.6 27.9  --  28.5   MCHC 31.4* 31.7*  --  32.9   RDW 43.1 43.6  --  42.0   PLATELETCT 189 208  --  230   MPV 8.9* 9.0  --  8.9*     Recent Labs     06/12/24  1350 06/13/24  0215    SODIUM 138 142   POTASSIUM 4.1 3.8   CHLORIDE 99 102   CO2 28 29   GLUCOSE 177* 145*   BUN 29* 21   CREATININE 1.13 0.74   CALCIUM 8.3* 8.6                   Imaging  DX-CHEST-PORTABLE (1 VIEW)   Final Result      1.  Low lung volumes with left greater than right base airspace disease concerning for multifocal pneumonia.      US-RUQ    (Results Pending)   DX-ESOPHAGUS - [id number]    (Results Pending)        Assessment/Plan  * Acute on chronic anemia- (present on admission)  Assessment & Plan  Hb previously downtrending though stable at time of discharge at 8.7  Hb on admission 7.6 --> 9.1 with S/p 1 unit RBCs in ED  - Trend H&H  -  - IV pantoprazole twice daily..  if Hemoglobin remained stable transition to p.o. Prilosec    Hypoglycemia- (present on admission)  Assessment & Plan  IV dextrose as needed    Decreased Lantus to 10 units q. evening and will continue to monitor and titrate accordingly    Transaminitis- (present on admission)  Assessment & Plan  Lab work from 6/8 showing normal LFT  Admission AST: : 318; ALP elevated 806  Total bili 0.2  CT C/A/P 5/27: Liver, gallbladder normal. No CBD dilation noted   - RUQ US pending  - Diagnostic alcohol normal  Normal Tylenol level    hepatitis panels negative  - Repeat CMP in a.m.    Anemia  Assessment & Plan  Hb previously downtrending though stable at time of discharge at 8.7  Hb on admission 7.6 --> 9.1  S/p 1 unit RBCs in ED  - Trend H&H  - Consider GI referral if Hb continues to downtrend  - Patient has a history of gastritis and esophagitis  - IV pantoprazole twice daily    Cervical stenosis of spinal canal- (present on admission)  Assessment & Plan  S/p C4-C7 ACDF on 6/4 and L4-L5 laminectomies with stealth fusion 6/7  - Continue Aspen cervical collar      It is unclear when the c-collar can be removed-patient remains in the hospital for an extended period time , may need to curbside Dr. Pulido otherwise patient should follow-up in the outpatient  setting with Dr. Pulido    Multifocal pneumonia- (present on admission)  Assessment & Plan  CXR: patchy infiltrates to right middle and right lower lobes.  CT C/A/P 5/27: Patchy bilateral infiltrates, greatest in upper right lobe.  Distal esophageal wall thickening  Completed antibiotic course with ceftriaxone and doxycycline on 6/2  Likely secondary to aspiration given recent surgeries and hospitalization  -  IV Unasyn  - Aspiration precautions  - Speech therapy consult    Acute on chronic respiratory failure (HCC)- (present on admission)  Assessment & Plan  Postoperatively during prior admission patient remained on 4 LPM's  Secondary to atelectasis, recent anesthesia and pneumonia  Currently on baseline 4 LPM  - Continue to monitor  - Continuous pulse ox monitoring    Uncontrolled type 2 diabetes mellitus with hyperglycemia (HCC)- (present on admission)  Assessment & Plan  A1C 12.4  Lantus decreased to 10 units q. evening  - Continue sliding scale insulin  - Monitor BG trend.   - Accu-Cheks before meals and at bedtime.   - Goal to keep BG between 140-180 per 2019 ADA guidelines          VTE prophylaxis:   SCDs/TEDs      I have performed a physical exam and reviewed and updated ROS and Plan today (6/13/2024). In review of yesterday's note (6/12/2024), there are no changes except as documented above.      Greater than 52 minutes spent prepping to see patient (e.g. review of tests) obtaining and/or reviewing separately obtained history. Performing a medically appropriate examination and/ evaluation.  Counseling and educating the patient/family/caregiver.  Ordering medications, tests, or procedures.  Referring and communicating with other health care professionals.  Documenting clinical information in EPIC.  Independently interpreting results and communicating results to patient/family/caregiver.  Care coordination.

## 2024-06-13 NOTE — HOSPITAL COURSE
Kevin Taylor is a 59-year-old male with a PMHx DMT2, GERD with esophagitis, chronic back pain.  Patient presented to the emergency room 6/12 at the request of his [employer] support person.  He states that they called him on the phone and patient did not sound like himself and they requested emergency room visit.       Patient was admitted [facility] 5/27 through 6/11 for quite equina syndrome.  He is S/p C4-C7 ACDF on 6/4 and L4-L5 laminectomies with stealth fusion 6/7. Patient was discharged from [facility] 6/11 to [facility].  It was very unclear from ED notes and from patient if he was at [place] or if he had returned home sometime in the last 24 hours.  He states that his sister had groceries delivered to him at home.  He has intermittently been confused in the emergency room.     Regardless, patient comes in with new elevation of LFTs and anemia.  He received 1 unit RBCs in the emergency room.

## 2024-06-13 NOTE — ASSESSMENT & PLAN NOTE
Lab work from 6/8 showing normal LFT  Admission AST: : 318; ALP elevated 806  Total bili 0.2  CT C/A/P 5/27: Liver, gallbladder normal. No CBD dilation noted   - RUQ US pending  - Diagnostic alcohol normal  Normal Tylenol level    hepatitis panels negative  - Repeat CMP in a.m.  RUQ US was ordered followed by MRCP to clarify.   Imaging showed possible gallbladder inflammation. Extend antibiotics. Ordered HIDA scan

## 2024-06-13 NOTE — THERAPY
"Speech Language Pathology   Clinical Swallow Evaluation     Patient Name: Christoph Taylor  AGE:  59 y.o., SEX:  male  Medical Record #: [id number]  Date of Service: 2024    History of Present Illness  60 y/o M admit  w/ hypoxia, confusion and low blood pressure. Pt recently underwent C4-C7 ACDF on  and L4-L5 laminectomies with stealth fusion . He was dc'd from [facility] to [facility] on .     Recent Admit on  w/ quite equina syndrome.    PMHx: DM II, GERD, esophagitis, chronic back pain    CXR : '1. Low lung volumes with left greater than right base airspace disease concerning for multifocal pneumonia\"    No previous ST notes in EMR    General Information:  Vitals  O2 (LPM): 4  O2 Delivery Device: Nasal Cannula  Level of Consciousness: Alert, Awake  Patient Behaviors: Forgetful  Orientation: Self, , General place, Current year, Current month  Follows Directives: Yes - simple commands only    Prior Living Situation & Level of Function:  Prior Services: None  Lives with - Patient's Self Care Capacity: Alone and Able to Care For Self     Communication: WNL per pt report  Swallowing: Pt endorses dysphagia to solids post op (s/p ACDF C4-7 on )     Oral Mechanism Evaluation:  Dentition: Good, Natural dentition   Facial Symmetry: Equal  Facial Sensation: Equal     Labial Observations: WFL   Lingual Observations: Midline  Motor Speech: WNL       Laryngeal Function:  Secretion Management: Adequate  Voice Quality: WFL     Cough: Perceptually WNL     Subjective  Pt seen A&Ox3 saturating on L NC w/ his aspen collar donned. He endorsed having dysphagia on solids since his procedure.    Assessment  Current Method of Nutrition: Oral diet (Full Liquids Diet)  Positioning: Salas's (60-90 degrees)  Bolus Administration: Patient  O2 (LPM): 4 O2 Delivery Device: Nasal Cannula  Factor(s) Affecting Performance: Impaired mental status  Tracheostomy : No     Swallowing Trials:  Swallowing " Trials  Ice: WFL  Thin Liquid (TN0): WFL  Liquidised (LQ3): Impaired  Pureed (PU4): Impaired    Comments: Pt self-fed, though required close assistance d/t reduced hand to mouth coordination and rapid rate + volume of intake. Oral stage was functional for liquids and purees completed; no gross oromotor deficits noted. There were multiple swallows, throat clearing and reports of globus sensation noted on liquidized textures, increasing w/ purees. Pt noted to take additional PO trials as his globus sensation increased- required mod cueing to pause and attempt to clear sensation of residue via completing additional swallows and/or a liquid was. Further trials deferred for instrumentation.      Clinical Impressions  Signs of pharyngeal dysphagia, likely subacute and likely r/t prevertebral edema s/p recent ACDF C4-7. Pt's admit CXR is concerning for pneumonia, possibly related to aspiration. Pt's mentation also impacts his swallow safety. Instrumentation is recommend to define swallow physiology and determine ST POC- MBS planned or later today. Further ST recs to follow.     Recommendations  Diet Consistency: Continue thin/all liquids pending instrumentation  Instrumentation: VFSS (MBSS)  Medication: Crush with applesauce, as appropriate  Supervision: Direct supervision during meals  Positioning: Fully upright and midline during oral intake, Meals sitting upright in a chair, as tolerated  Risk Management : Small bites/sips, Slow rate of intake  Oral Care: BID  Consult Referral(s): Dietician    SLP Treatment Plan  Treatment Plan: Dysphagia Treatment  SLP Frequency: 4x Per Week  Estimated Duration: Until Therapy Goals Met    Anticipated Discharge Needs  Discharge Recommendations: Recommend home health for continued speech therapy services   Therapy Recommendations Upon DC: Dysphagia Training      Patient / Family Goals  Patient / Family Goal #1: I want food  Short Term Goals  Short Term Goal # 1: Pt will participate in  instrumentation to define swallow physiolody and determine ST POC    Isahailey Pelaez, SLP

## 2024-06-13 NOTE — DISCHARGE PLANNING
Discussed in AM rounds, Patient is from [facility]. Spoke with patient at bedside and patient wants to wait on choice until he gets more information on what's going on.

## 2024-06-13 NOTE — PROGRESS NOTES
0917- received report from Ronda CASPER    0945- Patient is A&Ox 4, on 4L nasal cnaula, and has bed alarm on. Patient reporting a pain level of 9/10 (medication provided). Call light within reach and bed in lowest position. Reinforced the need to call for assistance. Plan of care discussed and patient does not have any further needs at this time.

## 2024-06-13 NOTE — ASSESSMENT & PLAN NOTE
Postoperatively during prior admission patient remained on 4 LPM's  Secondary to atelectasis, recent anesthesia and pneumonia  Currently on baseline 4 LPM  - Continue to monitor  - Continuous pulse ox monitoring

## 2024-06-13 NOTE — ASSESSMENT & PLAN NOTE
CXR: patchy infiltrates to right middle and right lower lobes.  CT C/A/P 5/27: Patchy bilateral infiltrates, greatest in upper right lobe.  Distal esophageal wall thickening  Completed antibiotic course with ceftriaxone and doxycycline on 6/2  Likely secondary to aspiration given recent surgeries and hospitalization  -  IV Unasyn  - Aspiration precautions  - Speech therapy consult

## 2024-06-13 NOTE — THERAPY
Speech Language Pathology   Videofluoroscopic Swallow Study Evaluation     Patient Name: Christoph Taylor  AGE:  59 y.o., SEX:  male  Medical Record #: [id number]  Date of Service: 6/13/2024    Pertinent Information  Current Method of Nutrition: Oral diet (full liquids)  Patient Behaviors: Forgetful  Dentition: Good, Natural dentition  Secretion Management: Adequate  Feeding Tube: None  Tracheostomy: No        Factor(s) Affecting Performance: None    Discussed the risks, benefits, and alternatives of the VFSS procedure. Patient/family acknowledged and agreed to proceed.    Assessment  Videofluoroscopic Swallow Study was conducted in the Lateral projection(s) to evaluate oropharyngeal swallow function. A radiology tech was present to assist with the procedure.    Positioning: Seated upright in fluoroscopy chair  Anatomic View: Other (comment) (ACDF hardware, preverebral edema)  Bolus Administration: Patient, SLP  PO Barium Contrast Trials: Varibar thin, Varibar nectar (mildly thick), Liquidised mixed with Varibar powder, Varibar pudding, Soft & Bite sized coated with Varibar powder, Regular solid coated with Varibar pudding, Barium tablet    Consistency PAS Score Timing Residue Comments   Thin Liquid 5 Pre Swallow, During swallow Vallecular Residue: Mild (5%-25%)  Pyriform Sinus Residue: Mild (5%-25%) Max PAS 5, Modal PAS 2  Tsp, cup and sequential straw trialled   Mildly Thick 2 Pre Swallow Vallecular Residue: Mild (5%-25%)  Pyriform Sinus Residue: Mild (5%-25%) Max PAS 2, Modal PAS 1  straw   Liquidised 1 N/A Vallecular Residue: Moderate (25%-50%)  Pyriform Sinus Residue: Trace (1%-5%) Residue cleared w/ a cued liquid wash   Pudding 1 N/A Vallecular Residue: Moderate (25%-50%)  Pyriform Sinus Residue: Trace (1%-5%) Residue cleared w/ a cued liquid wash   Soft & Bite Sized 1 N/A Vallecular Residue: Moderate (25%-50%)  Pyriform Sinus Residue: Trace (1%-5%) Residue cleared w/ a cued liquid wash and a cued dry  swallow   Regular Solid 1 N/A Vallecular Residue: Moderate (25%-50%)  Pyriform Sinus Residue: Trace (1%-5%)    Barium Tablet 1 N/A Vallecular Residue: Severe (>50%)  Pyriform Sinus Residue: None (0%) Pt took 3x/swallow to clear pill, initially retained in the vallecula, then above the UES     Penetration-Aspiration Scale (PAS)  1     No contrast enters airway  2     Contrast enters the airway, remains above the vocal folds, and is ejected from the airway (not seen in the airway at the end of the swallow).  3     Contrast enters the airway, remains above the vocal folds, and is not ejected from the airway (is seen in the airway after the swallow).  4     Contrast enters the airway, contacts the vocal folds, and is ejected from the airway.  5     Contrast enters the airway, contacts the vocal folds, and is not ejected from the airway  6     Contrast enters the airway, crosses the plane of the vocal folds, and is ejected from the airway.  7     Contrast enters the airway, crosses the plane of the vocal folds, and is not ejected from the airway despite effort.  8     Contrast enters the airway, crosses the plane of the vocal folds, is not ejected from the airway and there is no response to aspiration.     Oral phase: Functional mastication and AP transit. No gross oral residue post swallow    Pharyngeal phase: There was reduced hyolaryngeal elevation + excursion and pharyngeal stripping wave. Additionally, in the presence of prevertebral edema, epiglottic inversion was incomplete for airway closure during the swallow. This led to intermittent airway invasion events occurring before the during the swallow. Pt did not appear sensate to these events- though they were easily cleared w/ a cued throat clear or cough. Due to prevertebral edema and pharyngeal weakness- oropharyngeal bolus transit was also impacted. There were pharyngeal residues- particularly in the vallecula- increasing w/ viscosity.     Compensatory  Strategies: A liquid wash was effective in clearing most vallecular residue w/ puree trials. With soft solids, a liquid wash as well as an additional second swallow cleared the residues. Intermittent cued throat clearing during meals was effective in clearing trace penetration that occurred intermittently w/ thin liquids.     Severity Rating:   Severity Rating: OLGA     OLGA: Mild-Moderate    Clinical Impressions  The pt presents with pharyngeal dysphagia, likely subacute and largely impacted by prevertebral edema s/p ACDF. Pt also evidenced some pharyngeal weakness which may be d/t generalized weakness w/ admission for low blood pressure and/or CN X involvement post-op. Pt is at moderate risk for malnutrition/dehydration and aspiration PNA. Diet modification is indicated. Swallow prognosis is good pending ongoing time for spontaneous improvement and swallow rehabilitation. ST to f/u for same.     Recommendations  Diet Consistency: Thin/all Liquids, Minced & Moist Solids  Medication: Crush with pudding/puree, as appropriate  Supervision: Direct supervision during meals  Positioning: Fully upright and midline during oral intake, Meals sitting upright in a chair, as tolerated  Strategies: Alternate bites and sips, Small bites/sips, Slow rate of intake (Throat clear intermitently during meals)  Oral Care: BID  Additional Instrumentation: Repeat diagnostic study when clinically appropriate  Consult Referral(s): Dietician    SLP Treatment Plan  Treatment Plan: Dysphagia Treatment  SLP Frequency: 4x Per Week  Estimated Duration: Until Therapy Goals Met    Anticipated Discharge Needs  Discharge Recommendations: Recommend home health for continued speech therapy services   Therapy Recommendations Upon DC: Dysphagia Training      Patient / Family Goals  Patient / Family Goal #1: I want food  Goal #1 Outcome: Progressing as expected  Short Term Goal # 1: Pt will participate in instrumentation to define swallow physiolody and  determine ST POC  Goal Outcome # 1: Goal met  Short Term Goal # 2: Pt will tolerate a diet of minced & moist solids, thin/all liquids w/ no signs of aspiration related pulmonary complications  Short Term Goal # 3: Pt will follow rec'd compensatory swallow strategies across a 5 minute meal independently to justify dc'ing meal supervision    Isa Pelaez, SLP

## 2024-06-13 NOTE — PROGRESS NOTES
Hypoglycemia Intervention    Hypoglycemia protocol intervention:  Blood glucose 49 at 0609.  Intervention: 20 g IV dextrose 10% per MAR   Repeat blood glucose 124 at 0649.  Fernando PATTERSON notified of the above at 0615.

## 2024-06-13 NOTE — ASSESSMENT & PLAN NOTE
IV dextrose as needed    Decreased Lantus to 10 units q. evening and will continue to monitor and titrate accordingly

## 2024-06-13 NOTE — ED NOTES
Medication history reviewed with MAR provided by [facility] ([phone]).   Med rec is complete  Allergies reviewed.     Patient has not had any outpatient antibiotics in the last 30 days.   Anticoagulants: No    Lamar Arguello

## 2024-06-13 NOTE — ASSESSMENT & PLAN NOTE
S/p C4-C7 ACDF on 6/4 and L4-L5 laminectomies with stealth fusion 6/7  - Continue Aspen cervical collar      It is unclear when the c-collar can be removed-patient remains in the hospital for an extended period time , may need to curbside Dr. Pulido otherwise patient should follow-up in the outpatient setting with Dr. Pulido

## 2024-06-13 NOTE — CARE PLAN
The patient is Stable - Low risk of patient condition declining or worsening    Shift Goals  Clinical Goals: pain control, comfort, safety, IV abx  Patient Goals: pain control, comfort, rest  Family Goals: not at the bedside    Progress made toward(s) clinical / shift goals:        Problem: Pain - Standard  Goal: Alleviation of pain or a reduction in pain to the patient’s comfort goal  Outcome: Progressing  Note: Patient maintains 5/10 pain level with interventions in place.     Problem: Knowledge Deficit - Standard  Goal: Patient and family/care givers will demonstrate understanding of plan of care, disease process/condition, diagnostic tests and medications  Outcome: Progressing  Note: Patient updated on plan of care by team members.     Problem: Skin Integrity  Goal: Skin integrity is maintained or improved  Outcome: Progressing  Note: Skin breakdown prevention measures in place (mepilex, pillows for repositioning, patient moves self in bed, adequate oral intake)      Problem: Wound/ / Incision Healing  Goal: Patient's wound/surgical incision will decrease in size and heals properly  Outcome: Progressing  Note: Surgical wounds clean/dry/intact, no redness or irritation notes around incision.      Problem: Fall Risk  Goal: Patient will remain free from falls  Outcome: Progressing       Patient is not progressing towards the following goals:

## 2024-06-13 NOTE — PROGRESS NOTES
4 Eyes Skin Assessment Completed by PENNIE Cheng and PENNIE Ohara.    Head WDL  Ears Redness, Blanching behind the ears  Nose WDL  Mouth WDL  Neck Medial incision (gauze, steristrips in place)  Breast/Chest WDL  Shoulder Blades WDL  Spine Medial Incision from previous laminectomy (under gauze/tegaderm)  (R) Arm/Elbow/Hand Bruising (scattered)  (L) Arm/Elbow/Hand Bruising (scattered)  Abdomen WDL  Groin WDL  Scrotum/Coccyx/Buttocks WDL  (R) Leg Scab  (L) Leg Scab  (R) Heel/Foot/Toe Dry, Flaky, Boggy  (L) Heel/Foot/Toe Dr, Flaky, Boggy          Devices In Places Nasal Cannula and Cervical Collar      Interventions In Place Gray Ear Foams, Heel Mepilex, and Pillows    Possible Skin Injury No    Pictures Uploaded Into Epic Yes  Wound Consult Placed N/A  RN Wound Prevention Protocol Ordered No

## 2024-06-14 LAB
AMMONIA PLAS-SCNC: 24 UMOL/L (ref 11–45)
BACTERIA UR CULT: NORMAL
FERRITIN SERPL-MCNC: 509 NG/ML (ref 22–322)
GLUCOSE BLD STRIP.AUTO-MCNC: 118 MG/DL (ref 65–99)
GLUCOSE BLD STRIP.AUTO-MCNC: 140 MG/DL (ref 65–99)
GLUCOSE BLD STRIP.AUTO-MCNC: 156 MG/DL (ref 65–99)
GLUCOSE BLD STRIP.AUTO-MCNC: 172 MG/DL (ref 65–99)
HEMOCCULT STL QL: NEGATIVE
HGB BLD-MCNC: 8.8 G/DL (ref 14–18)
HGB BLD-MCNC: 9.2 G/DL (ref 14–18)
IRON SATN MFR SERPL: 15 % (ref 15–55)
IRON SERPL-MCNC: 24 UG/DL (ref 50–180)
SIGNIFICANT IND 70042: NORMAL
SITE SITE: NORMAL
SOURCE SOURCE: NORMAL
TIBC SERPL-MCNC: 160 UG/DL (ref 250–450)
UIBC SERPL-MCNC: 136 UG/DL (ref 110–370)

## 2024-06-14 PROCEDURE — 700111 HCHG RX REV CODE 636 W/ 250 OVERRIDE (IP): Mod: JZ | Performed by: INTERNAL MEDICINE

## 2024-06-14 PROCEDURE — 82140 ASSAY OF AMMONIA: CPT

## 2024-06-14 PROCEDURE — 700105 HCHG RX REV CODE 258: Performed by: INTERNAL MEDICINE

## 2024-06-14 PROCEDURE — A9270 NON-COVERED ITEM OR SERVICE: HCPCS | Performed by: INTERNAL MEDICINE

## 2024-06-14 PROCEDURE — 700111 HCHG RX REV CODE 636 W/ 250 OVERRIDE (IP): Performed by: INTERNAL MEDICINE

## 2024-06-14 PROCEDURE — 97162 PT EVAL MOD COMPLEX 30 MIN: CPT

## 2024-06-14 PROCEDURE — 96366 THER/PROPH/DIAG IV INF ADDON: CPT

## 2024-06-14 PROCEDURE — 96376 TX/PRO/DX INJ SAME DRUG ADON: CPT

## 2024-06-14 PROCEDURE — 82962 GLUCOSE BLOOD TEST: CPT | Mod: 91

## 2024-06-14 PROCEDURE — 94669 MECHANICAL CHEST WALL OSCILL: CPT

## 2024-06-14 PROCEDURE — 700101 HCHG RX REV CODE 250: Performed by: STUDENT IN AN ORGANIZED HEALTH CARE EDUCATION/TRAINING PROGRAM

## 2024-06-14 PROCEDURE — A9270 NON-COVERED ITEM OR SERVICE: HCPCS | Performed by: STUDENT IN AN ORGANIZED HEALTH CARE EDUCATION/TRAINING PROGRAM

## 2024-06-14 PROCEDURE — 99418 PROLNG IP/OBS E/M EA 15 MIN: CPT | Performed by: INTERNAL MEDICINE

## 2024-06-14 PROCEDURE — 99233 SBSQ HOSP IP/OBS HIGH 50: CPT | Mod: 25 | Performed by: INTERNAL MEDICINE

## 2024-06-14 PROCEDURE — 36415 COLL VENOUS BLD VENIPUNCTURE: CPT

## 2024-06-14 PROCEDURE — 96372 THER/PROPH/DIAG INJ SC/IM: CPT

## 2024-06-14 PROCEDURE — 700102 HCHG RX REV CODE 250 W/ 637 OVERRIDE(OP): Performed by: INTERNAL MEDICINE

## 2024-06-14 PROCEDURE — 700105 HCHG RX REV CODE 258: Performed by: STUDENT IN AN ORGANIZED HEALTH CARE EDUCATION/TRAINING PROGRAM

## 2024-06-14 PROCEDURE — 85018 HEMOGLOBIN: CPT | Mod: 91

## 2024-06-14 PROCEDURE — 82728 ASSAY OF FERRITIN: CPT

## 2024-06-14 PROCEDURE — 83550 IRON BINDING TEST: CPT

## 2024-06-14 PROCEDURE — 700102 HCHG RX REV CODE 250 W/ 637 OVERRIDE(OP): Performed by: STUDENT IN AN ORGANIZED HEALTH CARE EDUCATION/TRAINING PROGRAM

## 2024-06-14 PROCEDURE — 770006 HCHG ROOM/CARE - MED/SURG/GYN SEMI*

## 2024-06-14 PROCEDURE — 83540 ASSAY OF IRON: CPT

## 2024-06-14 PROCEDURE — 700111 HCHG RX REV CODE 636 W/ 250 OVERRIDE (IP): Mod: JZ | Performed by: STUDENT IN AN ORGANIZED HEALTH CARE EDUCATION/TRAINING PROGRAM

## 2024-06-14 PROCEDURE — 82272 OCCULT BLD FECES 1-3 TESTS: CPT

## 2024-06-14 PROCEDURE — C9113 INJ PANTOPRAZOLE SODIUM, VIA: HCPCS | Performed by: STUDENT IN AN ORGANIZED HEALTH CARE EDUCATION/TRAINING PROGRAM

## 2024-06-14 RX ORDER — LACTULOSE 20 G/30ML
30 SOLUTION ORAL 3 TIMES DAILY PRN
Status: DISCONTINUED | OUTPATIENT
Start: 2024-06-14 | End: 2024-06-18 | Stop reason: HOSPADM

## 2024-06-14 RX ORDER — OXYCODONE HYDROCHLORIDE 10 MG/1
10 TABLET ORAL
Status: DISCONTINUED | OUTPATIENT
Start: 2024-06-14 | End: 2024-06-18 | Stop reason: HOSPADM

## 2024-06-14 RX ORDER — GABAPENTIN 100 MG/1
100 CAPSULE ORAL 3 TIMES DAILY
Status: DISCONTINUED | OUTPATIENT
Start: 2024-06-14 | End: 2024-06-18 | Stop reason: HOSPADM

## 2024-06-14 RX ORDER — OXYCODONE HYDROCHLORIDE 5 MG/1
5 TABLET ORAL
Status: DISCONTINUED | OUTPATIENT
Start: 2024-06-14 | End: 2024-06-18 | Stop reason: HOSPADM

## 2024-06-14 RX ORDER — HYDROMORPHONE HYDROCHLORIDE 1 MG/ML
1 INJECTION, SOLUTION INTRAMUSCULAR; INTRAVENOUS; SUBCUTANEOUS
Status: DISCONTINUED | OUTPATIENT
Start: 2024-06-14 | End: 2024-06-18 | Stop reason: HOSPADM

## 2024-06-14 RX ADMIN — GABAPENTIN 100 MG: 100 CAPSULE ORAL at 12:04

## 2024-06-14 RX ADMIN — AMPICILLIN AND SULBACTAM 3 G: 1; 2 INJECTION, POWDER, FOR SOLUTION INTRAMUSCULAR; INTRAVENOUS at 12:07

## 2024-06-14 RX ADMIN — HYDROMORPHONE HYDROCHLORIDE 1 MG: 1 INJECTION, SOLUTION INTRAMUSCULAR; INTRAVENOUS; SUBCUTANEOUS at 15:38

## 2024-06-14 RX ADMIN — LIDOCAINE 2 PATCH: 4 PATCH TOPICAL at 05:10

## 2024-06-14 RX ADMIN — Medication 5 MG: at 23:11

## 2024-06-14 RX ADMIN — PANTOPRAZOLE SODIUM 40 MG: 40 INJECTION, POWDER, FOR SOLUTION INTRAVENOUS at 05:09

## 2024-06-14 RX ADMIN — AMPICILLIN AND SULBACTAM 3 G: 1; 2 INJECTION, POWDER, FOR SOLUTION INTRAMUSCULAR; INTRAVENOUS at 05:16

## 2024-06-14 RX ADMIN — SODIUM CHLORIDE: 9 INJECTION, SOLUTION INTRAVENOUS at 23:13

## 2024-06-14 RX ADMIN — METHOCARBAMOL 500 MG: 500 TABLET ORAL at 23:11

## 2024-06-14 RX ADMIN — SODIUM CHLORIDE: 9 INJECTION, SOLUTION INTRAVENOUS at 09:51

## 2024-06-14 RX ADMIN — OXYCODONE HYDROCHLORIDE 10 MG: 10 TABLET ORAL at 17:38

## 2024-06-14 RX ADMIN — OXYCODONE HYDROCHLORIDE 10 MG: 10 TABLET ORAL at 12:24

## 2024-06-14 RX ADMIN — INSULIN HUMAN 1 UNITS: 100 INJECTION, SOLUTION PARENTERAL at 17:52

## 2024-06-14 RX ADMIN — INSULIN HUMAN 1 UNITS: 100 INJECTION, SOLUTION PARENTERAL at 08:06

## 2024-06-14 RX ADMIN — AMPICILLIN AND SULBACTAM 3 G: 1; 2 INJECTION, POWDER, FOR SOLUTION INTRAMUSCULAR; INTRAVENOUS at 23:16

## 2024-06-14 RX ADMIN — VENLAFAXINE HYDROCHLORIDE 75 MG: 75 CAPSULE, EXTENDED RELEASE ORAL at 07:54

## 2024-06-14 RX ADMIN — AMPICILLIN AND SULBACTAM 3 G: 1; 2 INJECTION, POWDER, FOR SOLUTION INTRAMUSCULAR; INTRAVENOUS at 17:50

## 2024-06-14 RX ADMIN — LACTULOSE 30 ML: 20 SOLUTION ORAL at 17:38

## 2024-06-14 RX ADMIN — PANTOPRAZOLE SODIUM 40 MG: 40 INJECTION, POWDER, FOR SOLUTION INTRAVENOUS at 17:38

## 2024-06-14 RX ADMIN — OXYCODONE HYDROCHLORIDE 10 MG: 10 TABLET ORAL at 23:11

## 2024-06-14 RX ADMIN — GABAPENTIN 100 MG: 100 CAPSULE ORAL at 17:38

## 2024-06-14 RX ADMIN — HYDROMORPHONE HYDROCHLORIDE 0.5 MG: 1 INJECTION, SOLUTION INTRAMUSCULAR; INTRAVENOUS; SUBCUTANEOUS at 10:31

## 2024-06-14 RX ADMIN — INSULIN GLARGINE-YFGN 10 UNITS: 100 INJECTION, SOLUTION SUBCUTANEOUS at 17:53

## 2024-06-14 RX ADMIN — LACTULOSE 30 ML: 20 SOLUTION ORAL at 12:24

## 2024-06-14 RX ADMIN — OXYCODONE HYDROCHLORIDE 10 MG: 10 TABLET ORAL at 07:53

## 2024-06-14 ASSESSMENT — COGNITIVE AND FUNCTIONAL STATUS - GENERAL
MOVING TO AND FROM BED TO CHAIR: A LITTLE
SUGGESTED CMS G CODE MODIFIER MOBILITY: CK
MOVING FROM LYING ON BACK TO SITTING ON SIDE OF FLAT BED: A LITTLE
WALKING IN HOSPITAL ROOM: A LITTLE
CLIMB 3 TO 5 STEPS WITH RAILING: A LOT
STANDING UP FROM CHAIR USING ARMS: A LITTLE
MOBILITY SCORE: 18

## 2024-06-14 ASSESSMENT — GAIT ASSESSMENTS
ASSISTIVE DEVICE: FRONT WHEEL WALKER
GAIT LEVEL OF ASSIST: STANDBY ASSIST
DISTANCE (FEET): 4

## 2024-06-14 ASSESSMENT — PAIN DESCRIPTION - PAIN TYPE
TYPE: ACUTE PAIN

## 2024-06-14 NOTE — CARE PLAN
The patient is Stable - Low risk of patient condition declining or worsening    Shift Goals  Clinical Goals: pain control at or below comfort level, remain free from falls  Patient Goals: pain control, sleep, comfort  Family Goals: not at the bedside    Progress made toward(s) clinical / shift goals:  Pt has been able to remain fall free throughout shift, pt able to rest comfortably throughout night.     Patient is not progressing towards the following goals:

## 2024-06-14 NOTE — DIETARY
Nutrition services: Day 0 of admit.  Christoph Taylor is a 59 y.o. male with admitting DX of Anemia, Elevated liver function tests.  Consult received for Malnutrition Screening Tool score of 2 for unplanned weight loss of 2-13 lb x 3 months and poor appetite.    Per RN report, pt not eating well today, just a few bites. Spoke with pt at bedside. He states his appetite is a little low. He does drink Ensure at home and is receptive to addition of a supplement with meals.    Assessment:  Height: 182.9 cm (6')  Weight: 78.5 kg (173 lb 1 oz)  Body mass index is 23.47 kg/m²., BMI classification: Normal  Diet/Intake: Level 5 - minced and moist, thin liquids, 1:1 supervision by nursing    Evaluation:   One meal recorded so far at 50-75% on 6/13, though poor PO reported today per RN.  Weight per chart review indicates no weight loss and weight has increased from recent admission.  Wt Readings from Last 8 Encounters:   06/12/24 78.5 kg (173 lb 1 oz)   06/01/24 73.5 kg (162 lb 0.6 oz)   05/21/24 74 kg (163 lb 2.3 oz)   05/11/24 80.7 kg (177 lb 14.6 oz)   05/06/24 81.6 kg (180 lb)   02/26/24 81.2 kg (179 lb)   02/25/24 81.2 kg (179 lb 0.2 oz)   01/28/24 71.7 kg (158 lb 1.1 oz)     Labs 6/13 include Glu 145 (H), Alb 2.8 (L)  Medications include Lantus, SSI  No skin injury per RN skin assessment  No significant fat or muscle wasting.    Malnutrition Risk: ASPEN criteria not met.    Recommendations/Plan:   Glucerna supplements with meals.  Encourage intake of meals >50%.  Document intake of all meals as % taken in ADL's to provide interdisciplinary communication across all shifts.   Monitor weight.  Nutrition rep will continue to see patient for ongoing meal and snack preferences.     RD following

## 2024-06-14 NOTE — THERAPY
"Physical Therapy   Initial Evaluation     Patient Name: Christoph Taylor  Age:  59 y.o., Sex:  male  Medical Record #: [id number]  Today's Date: 6/14/2024     Precautions  Precautions: Fall Risk;Spinal / Back Precautions ;Cervical Collar    Comments: Aspen Collar, recent ACDF    Assessment  Patient is 59 y.o. male w/ hx of DM2, GERD, esophogitis, chronic back pain, chronic weakness, non ambulatory x 5-6 years.  Recent C4-7 ACDF and L4,5 Lami on 6/4/2024 and 6/7/2024 respectively.  Noted UE muscle wasting.  He wears an AFO on his LLE, which therapist witnessed him don indep. During a prior visit.  This visit PT donned his brace 2/2 c/o surgical site pain.  He was living alone in a single story house w/ 5 steps to enter, using a w/c for mobility, but having issues negotiating the stairs due to the w/c.  Since then, he has had above mentioned surgeries and was d/c'd to [facility] for \"two days\" per pt, where he just \"stayed in bed\".  He does not want to return to [facility].  Today, he is rec'd alert, in bed, pleasant and agreeable to work w/ PT.  He needs min assist to sit eob, at which time, he is able to maintain his balance w/o assist.  He is able to stand w/o assist and take several sidesteps, 4 ft, along the side of the bed w/ a fww w/ spv.  PT will follow and address impairments noted below.  Recommend oob prn w/ nsg and fww.  Plan    Physical Therapy Initial Treatment Plan   Treatment Plan : Bed Mobility, Gait Training, Therapeutic Activities  Treatment Frequency: 4 Times per Week  Duration: Until Therapy Goals Met    DC Equipment Recommendations: Unable to determine at this time  Discharge Recommendations: Recommend post-acute placement for additional physical therapy services prior to discharge home         Objective       06/14/24 0838   Prior Living Situation   Housing / Facility Mobile Home   Steps Into Home 5   Steps In Home 0   Rail Right Rail (Steps into Home)   Equipment Owned Front-Wheel Walker;Single " Point Cane;Wheelchair   Lives with - Patient's Self Care Capacity Alone and Able to Care For Self   Prior Level of Functional Mobility   Bed Mobility Independent   Transfer Status Independent   Assistive Devices Used Wheelchair   Wheelchair Independent   Upper Body Muscle Tone   Comments noted gross muscle wasting   Strength Lower Body   Comments grossly 3-3+/5 bilaterallyl, unable to accuratlyl assess 2/2 pain   Balance Assessment   Sitting Balance (Static) Fair   Sitting Balance (Dynamic) Fair   Standing Balance (Static) Fair   Standing Balance (Dynamic) Fair -   Weight Shift Sitting Fair   Weight Shift Standing Fair   Comments w/ fww   Bed Mobility    Supine to Sit Minimal Assist   Sit to Supine Supervised   Rolling Supervised   Gait Analysis   Gait Level Of Assist Standby Assist   Assistive Device Front Wheel Walker   Distance (Feet) 4   Comments Able to side step to his right   Functional Mobility   Sit to Stand Supervised   Bed, Chair, Wheelchair Transfer Refused   Comments Declined oob to chair, wanting to sleep.   Patient / Family Goals    Patient / Family Goal #1 post acute, not [place]   Short Term Goals    Short Term Goal # 1 Pt to move supine to/from eob w/ spv in 6 visits   Short Term Goal # 2 Pt to ambulate 75 ft w/ fww and spv in 6 visits   Physical Therapy Initial Treatment Plan    Treatment Plan  Bed Mobility;Gait Training;Therapeutic Activities   Treatment Frequency 4 Times per Week   Duration Until Therapy Goals Met   Problem List    Problems Pain;Impaired Bed Mobility;Impaired Ambulation;Decreased Activity Tolerance;Functional Strength Deficit   Anticipated Discharge Equipment and Recommendations   DC Equipment Recommendations Unable to determine at this time   Discharge Recommendations Recommend post-acute placement for additional physical therapy services prior to discharge home

## 2024-06-14 NOTE — CARE PLAN
The patient is Stable - Low risk of patient condition declining or worsening    Shift Goals  Clinical Goals: Pt will remain free from falls, report pain < 6 after PRN med admin, maintain stable blood sugars  Patient Goals: sleep  Family Goals: KATHERINE    Progress made toward(s) clinical / shift goals:    Pt remained free from falls throughout shift. Bed is locked and in lowest position. Call light and personal belongings are within reach.  Pt reported pain < 6 after PRN pain medication administration.  Blood sugars remained controlled throughout shift with the use of insulin and diet.      Problem: Pain - Standard  Goal: Alleviation of pain or a reduction in pain to the patient’s comfort goal  Outcome: Progressing     Problem: Respiratory  Goal: Patient will achieve/maintain optimum respiratory ventilation and gas exchange  Outcome: Progressing     Problem: Fall Risk  Goal: Patient will remain free from falls  Outcome: Progressing       Patient is not progressing towards the following goals:

## 2024-06-14 NOTE — DISCHARGE PLANNING
Case Management Discharge Planning    Admission Date: 6/12/2024  GMLOS:    ALOS: 0    6-Clicks ADL Score: 17  6-Clicks Mobility Score: 18    Anticipated Discharge Dispo: Discharge Disposition: D/T to SNF with Medicare cert in anticipation of skilled care (03)      Action(s) Taken: Referral(s) sent    Escalations Completed: None    Medically Clear: No    Next Steps: RNCM participated in IDT rounds. Patient is not medically cleared at this time. Patient was brought in from [facility] and has been stating he was Dc'd from there the other day. RNCM placed call to [facility] who stated patient was only there for a few days and was not discharged. They are willing to take him back upon DC. They will need to get auth. RNCM will send referral.     Barriers to Discharge: Medical clearance, Pending Placement, and Transportation    Is the patient up for discharge tomorrow: No

## 2024-06-14 NOTE — PROGRESS NOTES
[facility] Medicine Daily Progress Note    Date of Service  6/14/2024    Chief Complaint  Christoph Taylor is a 59 y.o. male admitted 6/12/2024 with low blood pressure    Hospital Course  Kevin Taylor is a 59-year-old male with a PMHx DMT2, GERD with esophagitis, chronic back pain.  Patient presented to the emergency room 6/12 at the request of his [employer] support person.  He states that they called him on the phone and patient did not sound like himself and they requested emergency room visit.       Patient was admitted [facility] 5/27 through 6/11 for quite equina syndrome.  He is S/p C4-C7 ACDF on 6/4 and L4-L5 laminectomies with stealth fusion 6/7. Patient was discharged from [facility] 6/11 to [facility].  It was very unclear from ED notes and from patient if he was at [place] or if he had returned home sometime in the last 24 hours.  He states that his sister had groceries delivered to him at home.  He has intermittently been confused in the emergency room.     Regardless, patient comes in with new elevation of LFTs and anemia.  He received 1 unit RBCs in the emergency room.       Interval Problem Update  Patient seen and examine at bedside  I reviewed the chart along with vitals, labs, imaging, test (both pending and resulted) and recommendations from specialists and interdisciplinary team.  Managing confusion due to  low blood pressure/anemia, transaminitis, hypoglycemia, pneumonia and chronic narcotics for recent cervical and lumbar laminectomies.  Received transfusion. Hb stable 9s. Ordered occult stool and iron panel.  Reducing Lantus however last A1c is 12.4.  Ordered ammonia levels  RUQ 6/13 showed distended gallbladder with sludge. MRCP recommended thus ordered and PENDING. Diet is currently minced and moist.    HE is still in pain. I started gabapentin. He did say his bladder and bowel function slightly better after surgery but his upper extremity weakness is the same. Neck brace in  place.   PT/OT, question if he just recently discharged from [facility] but he is not thrilled about it.     I have discussed this patient's plan of care and discharge plan at IDT rounds today with Case Management, Nursing, Nursing leadership, and other members of the IDT team.    Consultants/Specialty      Code Status  Full Code    Disposition  The patient is not medically cleared for discharge to home or a post-acute facility.      I have placed the appropriate orders for post-discharge needs.    Review of Systems  Review of Systems   Unable to perform ROS: Mental status change        Physical Exam  Temp:  [36.7 °C (98 °F)-37.1 °C (98.7 °F)] 36.9 °C (98.4 °F)  Pulse:  [] 98  Resp:  [16-17] 16  BP: (115-140)/(74-87) 115/74  SpO2:  [92 %-95 %] 92 %    Physical Exam  Constitutional:       General: He is not in acute distress.     Appearance: He is ill-appearing. He is not toxic-appearing.   HENT:      Head:      Comments: Neck brace  Eyes:      Extraocular Movements: Extraocular movements intact.      Pupils: Pupils are equal, round, and reactive to light.   Cardiovascular:      Rate and Rhythm: Normal rate and regular rhythm.      Heart sounds: No murmur heard.     No gallop.   Pulmonary:      Effort: No respiratory distress.      Breath sounds: No stridor. No wheezing, rhonchi or rales.   Chest:      Chest wall: No tenderness.   Abdominal:      General: There is no distension.      Palpations: There is no mass.      Tenderness: There is no abdominal tenderness. There is no guarding.      Hernia: No hernia is present.   Musculoskeletal:         General: No swelling, tenderness or deformity.      Cervical back: Normal range of motion.      Right lower leg: No edema.      Left lower leg: No edema.   Skin:     Capillary Refill: Capillary refill takes 2 to 3 seconds.   Neurological:      General: No focal deficit present.      Motor: Weakness present.   Psychiatric:      Comments: Odd affect         Fluids  No  intake or output data in the 24 hours ending 06/14/24 1514      Laboratory  Recent Labs     06/12/24  1350 06/12/24  1845 06/13/24  0211 06/13/24  0923 06/13/24  1747 06/14/24  0115 06/14/24  0921   WBC 7.7  --  7.1  --   --   --   --    RBC 2.72*  --  3.19*  --   --   --   --    HEMOGLOBIN 7.6*   < > 9.1*   < > 9.5* 8.8* 9.2*   HEMATOCRIT 24.0*  --  27.7*  --   --   --   --    MCV 88.2  --  86.8  --   --   --   --    MCH 27.9  --  28.5  --   --   --   --    MCHC 31.7*  --  32.9  --   --   --   --    RDW 43.6  --  42.0  --   --   --   --    PLATELETCT 208  --  230  --   --   --   --    MPV 9.0  --  8.9*  --   --   --   --     < > = values in this interval not displayed.     Recent Labs     06/12/24  1350 06/13/24  0211   SODIUM 138 142   POTASSIUM 4.1 3.8   CHLORIDE 99 102   CO2 28 29   GLUCOSE 177* 145*   BUN 29* 21   CREATININE 1.13 0.74   CALCIUM 8.3* 8.6                   Imaging  DX-ESOPHAGUS - [id number]   Final Result      US-RUQ   Final Result      1.  Distended gallbladder containing sludge.   2.  Small portion of the common duct is visualized and is mildly dilated. Correlate with biliary labs. Concern for obstruction, further evaluation with MRCP may be performed.      DX-CHEST-PORTABLE (1 VIEW)   Final Result      1.  Low lung volumes with left greater than right base airspace disease concerning for multifocal pneumonia.      [id number]/O    (Results Pending)        Assessment/Plan  * Acute on chronic anemia- (present on admission)  Assessment & Plan  Hb previously downtrending though stable at time of discharge at 8.7  Hb on admission 7.6 --> 9.1 with S/p 1 unit RBCs in ED  - Trend H&H  -  - IV pantoprazole twice daily..  if Hemoglobin remained stable transition to p.o. Prilosec  Recent Labs     06/12/24  1350 06/12/24  1845 06/13/24  0211 06/13/24  0923 06/13/24  1747 06/14/24  0115 06/14/24  0921   HEMOGLOBIN 7.6*   < > 9.1*   < > 9.5* 8.8* 9.2*   HEMATOCRIT 24.0*  --  27.7*  --   --   --   --     MCV 88.2  --  86.8  --   --   --   --    MCH 27.9  --  28.5  --   --   --   --    PLATELETCT 208  --  230  --   --   --   --     < > = values in this interval not displayed.     Stable    Hypoglycemia- (present on admission)  Assessment & Plan  IV dextrose as needed    Decreased Lantus to 10 units q. evening and will continue to monitor and titrate accordingly    Transaminitis- (present on admission)  Assessment & Plan  Lab work from 6/8 showing normal LFT  Admission AST: : 318; ALP elevated 806  Total bili 0.2  CT C/A/P 5/27: Liver, gallbladder normal. No CBD dilation noted   - RUQ US pending  - Diagnostic alcohol normal  Normal Tylenol level    hepatitis panels negative  - Repeat CMP in a.m.    Anemia  Assessment & Plan  Hb previously downtrending though stable at time of discharge at 8.7  Hb on admission 7.6 --> 9.1  S/p 1 unit RBCs in ED  - Trend H&H  - Consider GI referral if Hb continues to downtrend  - Patient has a history of gastritis and esophagitis  - IV pantoprazole twice daily    Cervical stenosis of spinal canal- (present on admission)  Assessment & Plan  S/p C4-C7 ACDF on 6/4 and L4-L5 laminectomies with stealth fusion 6/7  - Continue Aspen cervical collar      It is unclear when the c-collar can be removed-patient remains in the hospital for an extended period time , may need to curbside Dr. Pulido otherwise patient should follow-up in the outpatient setting with Dr. Pulido    Multifocal pneumonia- (present on admission)  Assessment & Plan  CXR: patchy infiltrates to right middle and right lower lobes.  CT C/A/P 5/27: Patchy bilateral infiltrates, greatest in upper right lobe.  Distal esophageal wall thickening  Completed antibiotic course with ceftriaxone and doxycycline on 6/2  Likely secondary to aspiration given recent surgeries and hospitalization  -  IV Unasyn  - Aspiration precautions  - Speech therapy consult    Acute on chronic respiratory failure (HCC)- (present on  admission)  Assessment & Plan  Postoperatively during prior admission patient remained on 4 LPM's  Secondary to atelectasis, recent anesthesia and pneumonia  Currently on baseline 4 LPM  - Continue to monitor  - Continuous pulse ox monitoring    Uncontrolled type 2 diabetes mellitus with hyperglycemia (HCC)- (present on admission)  Assessment & Plan  A1C 12.4  Lantus decreased to 10 units q. evening  - Continue sliding scale insulin  - Monitor BG trend.   - Accu-Cheks before meals and at bedtime.   - Goal to keep BG between 140-180 per 2019 ADA guidelines   BGs 100s, A1c 10+         VTE prophylaxis: VTE Selection    I have performed a physical exam and reviewed and updated ROS and Plan today (6/14/2024). In review of yesterday's note (6/13/2024), there are no changes except as documented above.    Patient is has a high medical complexity, complex decision making and is at high risk for complication, morbidity, and mortality, thus requiring the highest level of my preparedness for sudden, emergent intervention. Medical decision making is therefore complex. I provided  services, which included ordering labs and/or imaging, and discussing the case with various consultants.medication orders, frequent reevaluations of the patient's condition and response to treatment. Time was also devoted to counseling and coordinating care including review of records, pertinent lab data and studies, as well as discussing diagnostic evaluation and work up, planned therapeutic interventions and future disposition of care. Where indicated, the assessment and plan reflect discussion of patient with consultants, other healthcare providers, family members, and additional research needed to obtain further information in formulating the plan of care for Christoph Taylor. Total time spent was 65 minutes.

## 2024-06-14 NOTE — PROGRESS NOTES
0800: Assumed care of patient at 0700. Received report from night shift RN. Pt is A&O x 4, on 3L NC. Reporting a pain level of 10/10, medicated with oxycodone per MAR. Assessment completed and VSS. Bowel sounds are active in all 4 quadrants. Lung sounds are diminished in bases. Pt strength is 4/5 in all extremities with tingling which is pt's baseline. Pt ambulated 5ft with help from PT and steady gait. Bed is in lowest position, bed alarm on, and call light is within reach. Fall precautions are in place.

## 2024-06-15 ENCOUNTER — APPOINTMENT (OUTPATIENT)
Dept: RADIOLOGY | Facility: MEDICAL CENTER | Age: 59
DRG: 193 | End: 2024-06-15
Attending: INTERNAL MEDICINE
Payer: COMMERCIAL

## 2024-06-15 LAB
ALBUMIN SERPL BCP-MCNC: 2.6 G/DL (ref 3.2–4.9)
ALBUMIN/GLOB SERPL: 0.9 G/DL
ALP SERPL-CCNC: 405 U/L (ref 30–99)
ALT SERPL-CCNC: 93 U/L (ref 2–50)
ANION GAP SERPL CALC-SCNC: 11 MMOL/L (ref 7–16)
AST SERPL-CCNC: 31 U/L (ref 12–45)
BILIRUB SERPL-MCNC: 0.4 MG/DL (ref 0.1–1.5)
BUN SERPL-MCNC: 7 MG/DL (ref 8–22)
CALCIUM ALBUM COR SERPL-MCNC: 8.9 MG/DL (ref 8.5–10.5)
CALCIUM SERPL-MCNC: 7.8 MG/DL (ref 8.5–10.5)
CHLORIDE SERPL-SCNC: 95 MMOL/L (ref 96–112)
CO2 SERPL-SCNC: 32 MMOL/L (ref 20–33)
CREAT SERPL-MCNC: 0.5 MG/DL (ref 0.5–1.4)
ERYTHROCYTE [DISTWIDTH] IN BLOOD BY AUTOMATED COUNT: 41.1 FL (ref 35.9–50)
GFR SERPLBLD CREATININE-BSD FMLA CKD-EPI: 117 ML/MIN/1.73 M 2
GLOBULIN SER CALC-MCNC: 2.9 G/DL (ref 1.9–3.5)
GLUCOSE BLD STRIP.AUTO-MCNC: 129 MG/DL (ref 65–99)
GLUCOSE BLD STRIP.AUTO-MCNC: 190 MG/DL (ref 65–99)
GLUCOSE BLD STRIP.AUTO-MCNC: 226 MG/DL (ref 65–99)
GLUCOSE BLD STRIP.AUTO-MCNC: 82 MG/DL (ref 65–99)
GLUCOSE SERPL-MCNC: 92 MG/DL (ref 65–99)
HCT VFR BLD AUTO: 27 % (ref 42–52)
HGB BLD-MCNC: 8.9 G/DL (ref 14–18)
MCH RBC QN AUTO: 28.4 PG (ref 27–33)
MCHC RBC AUTO-ENTMCNC: 33 G/DL (ref 32.3–36.5)
MCV RBC AUTO: 86.3 FL (ref 81.4–97.8)
PHOSPHATE SERPL-MCNC: 2.7 MG/DL (ref 2.5–4.5)
PLATELET # BLD AUTO: 275 K/UL (ref 164–446)
PMV BLD AUTO: 8.3 FL (ref 9–12.9)
POTASSIUM SERPL-SCNC: 3.4 MMOL/L (ref 3.6–5.5)
PROT SERPL-MCNC: 5.5 G/DL (ref 6–8.2)
RBC # BLD AUTO: 3.13 M/UL (ref 4.7–6.1)
SODIUM SERPL-SCNC: 138 MMOL/L (ref 135–145)
WBC # BLD AUTO: 6.6 K/UL (ref 4.8–10.8)

## 2024-06-15 PROCEDURE — 80053 COMPREHEN METABOLIC PANEL: CPT

## 2024-06-15 PROCEDURE — A9270 NON-COVERED ITEM OR SERVICE: HCPCS | Performed by: STUDENT IN AN ORGANIZED HEALTH CARE EDUCATION/TRAINING PROGRAM

## 2024-06-15 PROCEDURE — 82962 GLUCOSE BLOOD TEST: CPT | Mod: 91

## 2024-06-15 PROCEDURE — 770006 HCHG ROOM/CARE - MED/SURG/GYN SEMI*

## 2024-06-15 PROCEDURE — 99233 SBSQ HOSP IP/OBS HIGH 50: CPT | Performed by: INTERNAL MEDICINE

## 2024-06-15 PROCEDURE — 700102 HCHG RX REV CODE 250 W/ 637 OVERRIDE(OP): Performed by: STUDENT IN AN ORGANIZED HEALTH CARE EDUCATION/TRAINING PROGRAM

## 2024-06-15 PROCEDURE — 74181 MRI ABDOMEN W/O CONTRAST: CPT

## 2024-06-15 PROCEDURE — 700111 HCHG RX REV CODE 636 W/ 250 OVERRIDE (IP): Performed by: STUDENT IN AN ORGANIZED HEALTH CARE EDUCATION/TRAINING PROGRAM

## 2024-06-15 PROCEDURE — 700105 HCHG RX REV CODE 258: Performed by: INTERNAL MEDICINE

## 2024-06-15 PROCEDURE — 700101 HCHG RX REV CODE 250: Performed by: STUDENT IN AN ORGANIZED HEALTH CARE EDUCATION/TRAINING PROGRAM

## 2024-06-15 PROCEDURE — C9113 INJ PANTOPRAZOLE SODIUM, VIA: HCPCS | Performed by: STUDENT IN AN ORGANIZED HEALTH CARE EDUCATION/TRAINING PROGRAM

## 2024-06-15 PROCEDURE — 700102 HCHG RX REV CODE 250 W/ 637 OVERRIDE(OP): Performed by: INTERNAL MEDICINE

## 2024-06-15 PROCEDURE — 36415 COLL VENOUS BLD VENIPUNCTURE: CPT

## 2024-06-15 PROCEDURE — 700111 HCHG RX REV CODE 636 W/ 250 OVERRIDE (IP): Performed by: INTERNAL MEDICINE

## 2024-06-15 PROCEDURE — 700111 HCHG RX REV CODE 636 W/ 250 OVERRIDE (IP): Mod: JZ | Performed by: INTERNAL MEDICINE

## 2024-06-15 PROCEDURE — A9270 NON-COVERED ITEM OR SERVICE: HCPCS | Performed by: INTERNAL MEDICINE

## 2024-06-15 PROCEDURE — 85027 COMPLETE CBC AUTOMATED: CPT

## 2024-06-15 PROCEDURE — 84100 ASSAY OF PHOSPHORUS: CPT

## 2024-06-15 RX ADMIN — PANTOPRAZOLE SODIUM 40 MG: 40 INJECTION, POWDER, FOR SOLUTION INTRAVENOUS at 06:16

## 2024-06-15 RX ADMIN — GABAPENTIN 100 MG: 100 CAPSULE ORAL at 06:18

## 2024-06-15 RX ADMIN — HYDROMORPHONE HYDROCHLORIDE 1 MG: 1 INJECTION, SOLUTION INTRAMUSCULAR; INTRAVENOUS; SUBCUTANEOUS at 22:25

## 2024-06-15 RX ADMIN — OXYCODONE HYDROCHLORIDE 10 MG: 10 TABLET ORAL at 13:03

## 2024-06-15 RX ADMIN — OXYCODONE HYDROCHLORIDE 10 MG: 10 TABLET ORAL at 06:18

## 2024-06-15 RX ADMIN — INSULIN GLARGINE-YFGN 10 UNITS: 100 INJECTION, SOLUTION SUBCUTANEOUS at 18:23

## 2024-06-15 RX ADMIN — SENNOSIDES AND DOCUSATE SODIUM 2 TABLET: 50; 8.6 TABLET ORAL at 18:17

## 2024-06-15 RX ADMIN — OXYCODONE HYDROCHLORIDE 10 MG: 10 TABLET ORAL at 09:48

## 2024-06-15 RX ADMIN — LIDOCAINE 2 PATCH: 4 PATCH TOPICAL at 06:19

## 2024-06-15 RX ADMIN — VENLAFAXINE HYDROCHLORIDE 75 MG: 75 CAPSULE, EXTENDED RELEASE ORAL at 08:48

## 2024-06-15 RX ADMIN — INSULIN HUMAN 2 UNITS: 100 INJECTION, SOLUTION PARENTERAL at 21:08

## 2024-06-15 RX ADMIN — AMPICILLIN AND SULBACTAM 3 G: 1; 2 INJECTION, POWDER, FOR SOLUTION INTRAMUSCULAR; INTRAVENOUS at 12:03

## 2024-06-15 RX ADMIN — HYDROMORPHONE HYDROCHLORIDE 1 MG: 1 INJECTION, SOLUTION INTRAMUSCULAR; INTRAVENOUS; SUBCUTANEOUS at 14:11

## 2024-06-15 RX ADMIN — GABAPENTIN 100 MG: 100 CAPSULE ORAL at 12:00

## 2024-06-15 RX ADMIN — OXYCODONE HYDROCHLORIDE 10 MG: 10 TABLET ORAL at 21:07

## 2024-06-15 RX ADMIN — OXYCODONE HYDROCHLORIDE 10 MG: 10 TABLET ORAL at 03:10

## 2024-06-15 RX ADMIN — PANTOPRAZOLE SODIUM 40 MG: 40 INJECTION, POWDER, FOR SOLUTION INTRAVENOUS at 18:14

## 2024-06-15 RX ADMIN — INSULIN HUMAN 1 UNITS: 100 INJECTION, SOLUTION PARENTERAL at 18:23

## 2024-06-15 RX ADMIN — GABAPENTIN 100 MG: 100 CAPSULE ORAL at 18:17

## 2024-06-15 RX ADMIN — AMPICILLIN AND SULBACTAM 3 G: 1; 2 INJECTION, POWDER, FOR SOLUTION INTRAMUSCULAR; INTRAVENOUS at 06:20

## 2024-06-15 RX ADMIN — OXYCODONE HYDROCHLORIDE 10 MG: 10 TABLET ORAL at 18:33

## 2024-06-15 RX ADMIN — AMPICILLIN AND SULBACTAM 3 G: 1; 2 INJECTION, POWDER, FOR SOLUTION INTRAMUSCULAR; INTRAVENOUS at 18:12

## 2024-06-15 ASSESSMENT — PAIN DESCRIPTION - PAIN TYPE
TYPE: ACUTE PAIN

## 2024-06-15 ASSESSMENT — FIBROSIS 4 INDEX: FIB4 SCORE: 0.7

## 2024-06-15 NOTE — PROGRESS NOTES
[facility] Medicine Daily Progress Note    Date of Service  6/15/2024    Chief Complaint  Christoph Taylor is a 59 y.o. male admitted 6/12/2024 with low blood pressure    Hospital Course  Kevin Taylor is a 59-year-old male with a PMHx DMT2, GERD with esophagitis, chronic back pain.  Patient presented to the emergency room 6/12 at the request of his [employer] support person.  He states that they called him on the phone and patient did not sound like himself and they requested emergency room visit.       Patient was admitted [facility] 5/27 through 6/11 for quite equina syndrome.  He is S/p C4-C7 ACDF on 6/4 and L4-L5 laminectomies with stealth fusion 6/7. Patient was discharged from [facility] 6/11 to [facility].  It was very unclear from ED notes and from patient if he was at [place] or if he had returned home sometime in the last 24 hours.  He states that his sister had groceries delivered to him at home.  He has intermittently been confused in the emergency room.     Regardless, patient comes in with new elevation of LFTs and anemia.  He received 1 unit RBCs in the emergency room.       Interval Problem Update  Patient seen and examine at bedside  I reviewed the chart along with vitals, labs, imaging, test (both pending and resulted) and recommendations from specialists and interdisciplinary team.  Managing confusion due to  low blood pressure/anemia, transaminitis, hypoglycemia, pneumonia and chronic narcotics for recent cervical and lumbar laminectomies.  Received transfusion. Hb stable 9s. Ordered occult stool and iron panel.  Reducing Lantus however last A1c is 12.4.  Ordered ammonia levels  RUQ 6/13 showed distended gallbladder with sludge. MRCP recommended thus ordered and PENDING. Diet is currently minced and moist.    Mild improvement of bladder and bowel function after lumbar surgery, no change in upper extremity pain and weakness after cervical surgery. Started gabapentin for pain  control. Given his overall pain difficult to assess if he has abdominal pain but he is tolerating diet currently. Neck brace in place.   PT/OT, question if he just recently discharged from [facility] but he is not thrilled about it.     I have discussed this patient's plan of care and discharge plan at IDT rounds today with Case Management, Nursing, Nursing leadership, and other members of the IDT team.    Consultants/Specialty      Code Status  Full Code    Disposition  Medically Cleared  I have placed the appropriate orders for post-discharge needs.    Review of Systems  Review of Systems   Unable to perform ROS: Mental status change        Physical Exam  Temp:  [36.1 °C (96.9 °F)-36.8 °C (98.2 °F)] 36.3 °C (97.4 °F)  Pulse:  [79-93] 91  Resp:  [16-20] 20  BP: (103-153)/(71-93) 124/77  SpO2:  [92 %-97 %] 94 %    Physical Exam  Constitutional:       General: He is not in acute distress.     Appearance: He is ill-appearing. He is not toxic-appearing.   HENT:      Head:      Comments: Neck brace  Eyes:      Extraocular Movements: Extraocular movements intact.      Pupils: Pupils are equal, round, and reactive to light.   Cardiovascular:      Rate and Rhythm: Normal rate and regular rhythm.      Heart sounds: No murmur heard.     No gallop.   Pulmonary:      Effort: No respiratory distress.      Breath sounds: No stridor. No wheezing, rhonchi or rales.   Chest:      Chest wall: No tenderness.   Abdominal:      General: There is no distension.      Palpations: There is no mass.      Tenderness: There is no abdominal tenderness. There is no guarding.      Hernia: No hernia is present.   Musculoskeletal:         General: No swelling, tenderness or deformity.      Cervical back: Normal range of motion.      Right lower leg: No edema.      Left lower leg: No edema.   Skin:     Capillary Refill: Capillary refill takes 2 to 3 seconds.   Neurological:      General: No focal deficit present.      Motor: Weakness present.    Psychiatric:      Comments: Odd affect         Fluids    Intake/Output Summary (Last 24 hours) at 6/15/2024 1618  Last data filed at 6/15/2024 1406  Gross per 24 hour   Intake 1500 ml   Output 250 ml   Net 1250 ml         Laboratory  Recent Labs     06/13/24 0211 06/13/24 0923 06/14/24  0115 06/14/24  0921 06/15/24  0128   WBC 7.1  --   --   --  6.6   RBC 3.19*  --   --   --  3.13*   HEMOGLOBIN 9.1*   < > 8.8* 9.2* 8.9*   HEMATOCRIT 27.7*  --   --   --  27.0*   MCV 86.8  --   --   --  86.3   MCH 28.5  --   --   --  28.4   MCHC 32.9  --   --   --  33.0   RDW 42.0  --   --   --  41.1   PLATELETCT 230  --   --   --  275   MPV 8.9*  --   --   --  8.3*    < > = values in this interval not displayed.     Recent Labs     06/13/24  0211 06/15/24  0128   SODIUM 142 138   POTASSIUM 3.8 3.4*   CHLORIDE 102 95*   CO2 29 32   GLUCOSE 145* 92   BUN 21 7*   CREATININE 0.74 0.50   CALCIUM 8.6 7.8*                   Imaging  DX-ESOPHAGUS - [id number]   Final Result      US-RUQ   Final Result      1.  Distended gallbladder containing sludge.   2.  Small portion of the common duct is visualized and is mildly dilated. Correlate with biliary labs. Concern for obstruction, further evaluation with MRCP may be performed.      DX-CHEST-PORTABLE (1 VIEW)   Final Result      1.  Low lung volumes with left greater than right base airspace disease concerning for multifocal pneumonia.      [id number]    (Results Pending)        Assessment/Plan  * Acute on chronic anemia- (present on admission)  Assessment & Plan  Hb previously downtrending though stable at time of discharge at 8.7  Hb on admission 7.6 --> 9.1 with S/p 1 unit RBCs in ED  - Trend H&H  -  - IV pantoprazole twice daily..  if Hemoglobin remained stable transition to p.o. Prilosec  Recent Labs     06/13/24 0211 06/13/24 0923 06/14/24  0115 06/14/24  0921 06/15/24  0128   HEMOGLOBIN 9.1*   < > 8.8* 9.2* 8.9*   HEMATOCRIT 27.7*  --   --   --  27.0*   MCV 86.8  --   --   --   86.3   MCH 28.5  --   --   --  28.4   PLATELETCT 230  --   --   --  275    < > = values in this interval not displayed.     Stable    Hypoglycemia- (present on admission)  Assessment & Plan  IV dextrose as needed    Decreased Lantus to 10 units q. evening and will continue to monitor and titrate accordingly    Transaminitis- (present on admission)  Assessment & Plan  Lab work from 6/8 showing normal LFT  Admission AST: : 318; ALP elevated 806  Total bili 0.2  CT C/A/P 5/27: Liver, gallbladder normal. No CBD dilation noted   - RUQ US pending  - Diagnostic alcohol normal  Normal Tylenol level    hepatitis panels negative  - Repeat CMP in a.m.    Anemia  Assessment & Plan  Hb previously downtrending though stable at time of discharge at 8.7  Hb on admission 7.6 --> 9.1  S/p 1 unit RBCs in ED  - Trend H&H  - Consider GI referral if Hb continues to downtrend  - Patient has a history of gastritis and esophagitis  - IV pantoprazole twice daily    Cauda equina compression (HCC)- (present on admission)  Assessment & Plan  S/p cervical and lumbar laminectomies    Cervical stenosis of spinal canal- (present on admission)  Assessment & Plan  S/p C4-C7 ACDF on 6/4 and L4-L5 laminectomies with stealth fusion 6/7  - Continue Aspen cervical collar      It is unclear when the c-collar can be removed-patient remains in the hospital for an extended period time , may need to curbside Dr. Pulido otherwise patient should follow-up in the outpatient setting with Dr. Pulido    Multifocal pneumonia- (present on admission)  Assessment & Plan  CXR: patchy infiltrates to right middle and right lower lobes.  CT C/A/P 5/27: Patchy bilateral infiltrates, greatest in upper right lobe.  Distal esophageal wall thickening  Completed antibiotic course with ceftriaxone and doxycycline on 6/2  Likely secondary to aspiration given recent surgeries and hospitalization  -  IV Unasyn  - Aspiration precautions  - Speech therapy consult    Acute on  chronic respiratory failure (HCC)- (present on admission)  Assessment & Plan  Postoperatively during prior admission patient remained on 4 LPM's  Secondary to atelectasis, recent anesthesia and pneumonia  Currently on baseline 4 LPM  - Continue to monitor  - Continuous pulse ox monitoring    Uncontrolled type 2 diabetes mellitus with hyperglycemia (HCC)- (present on admission)  Assessment & Plan  A1C 12.4  Lantus decreased to 10 units q. evening  - Continue sliding scale insulin  - Monitor BG trend.   - Accu-Cheks before meals and at bedtime.   - Goal to keep BG between 140-180 per 2019 ADA guidelines   BGs 100s, A1c 10+         VTE prophylaxis: VTE Selection    I have performed a physical exam and reviewed and updated ROS and Plan today (6/15/2024). In review of yesterday's note (6/14/2024), there are no changes except as documented above.    Patient is has a high medical complexity, complex decision making and is at high risk for complication, morbidity, and mortality, thus requiring the highest level of my preparedness for sudden, emergent intervention. Medical decision making is therefore complex. I provided  services, which included ordering labs and/or imaging, and discussing the case with various consultants.medication orders, frequent reevaluations of the patient's condition and response to treatment. Time was also devoted to counseling and coordinating care including review of records, pertinent lab data and studies, as well as discussing diagnostic evaluation and work up, planned therapeutic interventions and future disposition of care. Where indicated, the assessment and plan reflect discussion of patient with consultants, other healthcare providers, family members, and additional research needed to obtain further information in formulating the plan of care for Christoph Taylor. Total time spent was 51 minutes.

## 2024-06-15 NOTE — CARE PLAN
The patient is Stable - Low risk of patient condition declining or worsening    Shift Goals  Clinical Goals: Pt will notify RN when requesting pain med intervention  Patient Goals: pain control      Progress made toward(s) clinical / shift goals:  Pt used call light appropriately to notify RN of needing pain med for back pain. Medicated per MAR for pain level 7/10 that had relief to 4/10 pain after oxycodone use.     Patient is not progressing towards the following goals:

## 2024-06-15 NOTE — PROGRESS NOTES
Rec'd report from day shift RN. Assumed pt care. Assessment completed. AA&OX4. Denies pain at this time. Aspen collar neck brace in use. Pt is 1 max assist to BSC, can be unsteady. Pt does not call appropriately, pt yells into hallway. Educated pt on need to use call light as pt has a roommate. Dressings in place to spine, bilat hip area, anterior neck, all dressings are CDI. Brace on place to left foot. Bed in lowest position, bed locked, bed alarm on for safety, treaded socks in place, RN and CNA numbers provided, call light within reach.

## 2024-06-16 LAB
ALBUMIN SERPL BCP-MCNC: 2.9 G/DL (ref 3.2–4.9)
BUN SERPL-MCNC: 12 MG/DL (ref 8–22)
CALCIUM ALBUM COR SERPL-MCNC: 8.6 MG/DL (ref 8.5–10.5)
CALCIUM SERPL-MCNC: 7.7 MG/DL (ref 8.5–10.5)
CHLORIDE SERPL-SCNC: 93 MMOL/L (ref 96–112)
CO2 SERPL-SCNC: 33 MMOL/L (ref 20–33)
CREAT SERPL-MCNC: 0.55 MG/DL (ref 0.5–1.4)
GFR SERPLBLD CREATININE-BSD FMLA CKD-EPI: 113 ML/MIN/1.73 M 2
GLUCOSE BLD STRIP.AUTO-MCNC: 193 MG/DL (ref 65–99)
GLUCOSE BLD STRIP.AUTO-MCNC: 230 MG/DL (ref 65–99)
GLUCOSE BLD STRIP.AUTO-MCNC: 247 MG/DL (ref 65–99)
GLUCOSE BLD STRIP.AUTO-MCNC: 282 MG/DL (ref 65–99)
GLUCOSE SERPL-MCNC: 177 MG/DL (ref 65–99)
PHOSPHATE SERPL-MCNC: 3.3 MG/DL (ref 2.5–4.5)
POTASSIUM SERPL-SCNC: 4.1 MMOL/L (ref 3.6–5.5)
SODIUM SERPL-SCNC: 136 MMOL/L (ref 135–145)

## 2024-06-16 PROCEDURE — 700101 HCHG RX REV CODE 250: Performed by: STUDENT IN AN ORGANIZED HEALTH CARE EDUCATION/TRAINING PROGRAM

## 2024-06-16 PROCEDURE — 700105 HCHG RX REV CODE 258: Performed by: INTERNAL MEDICINE

## 2024-06-16 PROCEDURE — 51798 US URINE CAPACITY MEASURE: CPT

## 2024-06-16 PROCEDURE — 94669 MECHANICAL CHEST WALL OSCILL: CPT

## 2024-06-16 PROCEDURE — A9270 NON-COVERED ITEM OR SERVICE: HCPCS | Performed by: INTERNAL MEDICINE

## 2024-06-16 PROCEDURE — 36415 COLL VENOUS BLD VENIPUNCTURE: CPT

## 2024-06-16 PROCEDURE — 700111 HCHG RX REV CODE 636 W/ 250 OVERRIDE (IP): Mod: JZ | Performed by: INTERNAL MEDICINE

## 2024-06-16 PROCEDURE — 94760 N-INVAS EAR/PLS OXIMETRY 1: CPT

## 2024-06-16 PROCEDURE — 80069 RENAL FUNCTION PANEL: CPT

## 2024-06-16 PROCEDURE — 82962 GLUCOSE BLOOD TEST: CPT | Mod: 91

## 2024-06-16 PROCEDURE — 700111 HCHG RX REV CODE 636 W/ 250 OVERRIDE (IP): Performed by: INTERNAL MEDICINE

## 2024-06-16 PROCEDURE — 99221 1ST HOSP IP/OBS SF/LOW 40: CPT | Performed by: SURGERY

## 2024-06-16 PROCEDURE — C9113 INJ PANTOPRAZOLE SODIUM, VIA: HCPCS | Performed by: STUDENT IN AN ORGANIZED HEALTH CARE EDUCATION/TRAINING PROGRAM

## 2024-06-16 PROCEDURE — 700102 HCHG RX REV CODE 250 W/ 637 OVERRIDE(OP): Performed by: INTERNAL MEDICINE

## 2024-06-16 PROCEDURE — 700111 HCHG RX REV CODE 636 W/ 250 OVERRIDE (IP): Performed by: STUDENT IN AN ORGANIZED HEALTH CARE EDUCATION/TRAINING PROGRAM

## 2024-06-16 PROCEDURE — 770006 HCHG ROOM/CARE - MED/SURG/GYN SEMI*

## 2024-06-16 PROCEDURE — 99233 SBSQ HOSP IP/OBS HIGH 50: CPT | Performed by: INTERNAL MEDICINE

## 2024-06-16 PROCEDURE — A9270 NON-COVERED ITEM OR SERVICE: HCPCS | Performed by: STUDENT IN AN ORGANIZED HEALTH CARE EDUCATION/TRAINING PROGRAM

## 2024-06-16 PROCEDURE — 700102 HCHG RX REV CODE 250 W/ 637 OVERRIDE(OP): Performed by: STUDENT IN AN ORGANIZED HEALTH CARE EDUCATION/TRAINING PROGRAM

## 2024-06-16 RX ADMIN — INSULIN GLARGINE-YFGN 10 UNITS: 100 INJECTION, SOLUTION SUBCUTANEOUS at 17:40

## 2024-06-16 RX ADMIN — OXYCODONE HYDROCHLORIDE 10 MG: 10 TABLET ORAL at 06:31

## 2024-06-16 RX ADMIN — PANTOPRAZOLE SODIUM 40 MG: 40 INJECTION, POWDER, FOR SOLUTION INTRAVENOUS at 17:46

## 2024-06-16 RX ADMIN — HYDROMORPHONE HYDROCHLORIDE 1 MG: 1 INJECTION, SOLUTION INTRAMUSCULAR; INTRAVENOUS; SUBCUTANEOUS at 07:34

## 2024-06-16 RX ADMIN — INSULIN HUMAN 2 UNITS: 100 INJECTION, SOLUTION PARENTERAL at 08:28

## 2024-06-16 RX ADMIN — AMPICILLIN AND SULBACTAM 3 G: 1; 2 INJECTION, POWDER, FOR SOLUTION INTRAMUSCULAR; INTRAVENOUS at 05:04

## 2024-06-16 RX ADMIN — AMPICILLIN AND SULBACTAM 3 G: 1; 2 INJECTION, POWDER, FOR SOLUTION INTRAMUSCULAR; INTRAVENOUS at 01:16

## 2024-06-16 RX ADMIN — GABAPENTIN 100 MG: 100 CAPSULE ORAL at 04:55

## 2024-06-16 RX ADMIN — GABAPENTIN 100 MG: 100 CAPSULE ORAL at 12:08

## 2024-06-16 RX ADMIN — INSULIN HUMAN 2 UNITS: 100 INJECTION, SOLUTION PARENTERAL at 21:24

## 2024-06-16 RX ADMIN — VENLAFAXINE HYDROCHLORIDE 75 MG: 75 CAPSULE, EXTENDED RELEASE ORAL at 08:29

## 2024-06-16 RX ADMIN — METHOCARBAMOL 500 MG: 500 TABLET ORAL at 12:07

## 2024-06-16 RX ADMIN — AMPICILLIN AND SULBACTAM 3 G: 1; 2 INJECTION, POWDER, FOR SOLUTION INTRAMUSCULAR; INTRAVENOUS at 12:04

## 2024-06-16 RX ADMIN — AMPICILLIN AND SULBACTAM 3 G: 1; 2 INJECTION, POWDER, FOR SOLUTION INTRAMUSCULAR; INTRAVENOUS at 17:51

## 2024-06-16 RX ADMIN — INSULIN HUMAN 3 UNITS: 100 INJECTION, SOLUTION PARENTERAL at 17:40

## 2024-06-16 RX ADMIN — GABAPENTIN 100 MG: 100 CAPSULE ORAL at 17:46

## 2024-06-16 RX ADMIN — OXYCODONE HYDROCHLORIDE 10 MG: 10 TABLET ORAL at 10:06

## 2024-06-16 RX ADMIN — LIDOCAINE 2 PATCH: 4 PATCH TOPICAL at 04:55

## 2024-06-16 RX ADMIN — PANTOPRAZOLE SODIUM 40 MG: 40 INJECTION, POWDER, FOR SOLUTION INTRAVENOUS at 04:55

## 2024-06-16 RX ADMIN — AMPICILLIN AND SULBACTAM 3 G: 1; 2 INJECTION, POWDER, FOR SOLUTION INTRAMUSCULAR; INTRAVENOUS at 23:43

## 2024-06-16 RX ADMIN — INSULIN HUMAN 1 UNITS: 100 INJECTION, SOLUTION PARENTERAL at 12:09

## 2024-06-16 RX ADMIN — OXYCODONE HYDROCHLORIDE 10 MG: 10 TABLET ORAL at 23:40

## 2024-06-16 RX ADMIN — OXYCODONE HYDROCHLORIDE 10 MG: 10 TABLET ORAL at 15:51

## 2024-06-16 RX ADMIN — SENNOSIDES AND DOCUSATE SODIUM 2 TABLET: 50; 8.6 TABLET ORAL at 17:46

## 2024-06-16 ASSESSMENT — PAIN DESCRIPTION - PAIN TYPE
TYPE: ACUTE PAIN

## 2024-06-16 NOTE — CARE PLAN
The patient is Stable - Low risk of patient condition declining or worsening    Shift Goals  Clinical Goals: Pt will recieve MRI, Pt's pain will be controlled this shift  Patient Goals: pain management  Family Goals: KATHERINE    Progress made toward(s) clinical / shift goals:  Pt received MRI this shift, Pt remained free from falls this shift. Fall prevention measures in place including treaded socks, area free from clutter, pt educated on call light use and call light within reach, hourly rounding, and pt educated on safety plan. Bed alarm in place. Patient's pain levels were assessed every four hours and PRN with proper interventions put in place which allowed him to reach his pain comfort goal throughout the shift.    Problem: Fall Risk  Goal: Patient will remain free from falls  Outcome: Progressing     Problem: Knowledge Deficit - Standard  Goal: Patient and family/care givers will demonstrate understanding of plan of care, disease process/condition, diagnostic tests and medications  Outcome: Progressing     Problem: Pain - Standard  Goal: Alleviation of pain or a reduction in pain to the patient’s comfort goal  Outcome: Progressing     Patient is not progressing towards the following goals:

## 2024-06-16 NOTE — CARE PLAN
The patient is Stable - Low risk of patient condition declining or worsening    Shift Goals  Clinical Goals: Pain control, urinary retention management  Patient Goals: comfort  Family Goals: vicente    Progress made toward(s) clinical / shift goals:        Problem: Pain - Standard  Goal: Alleviation of pain or a reduction in pain to the patient’s comfort goal  Outcome: Progressing    Patient maintains 3 pain level with interventions in place.     Problem: Fall Risk  Goal: Patient will remain free from falls  Outcome: Progressing     Pt free of falls, fall prevention measures in place and charted.    Patient is not progressing towards the following goals:

## 2024-06-16 NOTE — PROGRESS NOTES
"Hospital Medicine Daily Progress Note    Date of Service  6/16/2024    Chief Complaint  Christoph Taylor is a 59 y.o. male admitted 6/12/2024 with low blood pressure    Hospital Course  Kevin Taylor is a 59-year-old male with a PMHx DMT2, GERD with esophagitis, chronic back pain.  Patient presented to the emergency room 6/12 at the request of his [employer] support person.  He states that they called him on the phone and patient did not sound like himself and they requested emergency room visit.       [place] [facility] [place]11 for quite equina syndrome.  He is S/p C4-C7 ACDF on 6/4 and L4-L5 laminectomies with stealth fusion 6/7. Patient was discharged from [facility] 6/11 to [facility].  It was very unclear from ED notes and from patient if he was at [facility] or if he had returned home sometime in the last 24 hours.  He states that his sister had groceries delivered to him at home.  He has intermittently been confused in the emergency room.     Regardless, patient comes in with new elevation of LFTs and anemia.  He received 1 unit RBCs in the emergency room.       Interval Problem Update  Patient seen and examine at bedside  I reviewed the chart along with vitals, labs, imaging, test (both pending and resulted) and recommendations from specialists and interdisciplinary team.  Managing confusion due to  low blood pressure/anemia, transaminitis, hypoglycemia, pneumonia and chronic narcotics for recent cervical and lumbar laminectomies.  Received transfusion. Hb stable 9s. Ordered occult stool and iron panel.  Reducing Lantus however last A1c is 12.4.  Mild improvement of bladder and bowel function after lumbar surgery, no change in upper extremity pain and weakness after cervical surgery. Started gabapentin and on narcotics for pain control. He is requesting to SPEAK w/ NSG about his \"CAGE procedure\" (his family will come visit him TOMORROW). I mentioned we will probably have " to reach out MONDAY. Known urinary retention, also affected by narcotics; bladder scanned and ordered Crowley catheter for urinary incontinence.   RUQ 6/13 showed distended gallbladder with sludge. MRCP recommended thus ordered and came back no CBD dilation but gallbladder slightly dilated with some pericholecystic fluid. Extended antibiotics (was completing a course for pneumonia) and ordered HIDA scan which is PENDING. CONSULTED Dr. Lmabert, SURGERY to review the findings. Diet is currently minced and moist.    PT/OT, question if he just recently discharged from [facility] but he is not thrilled about it.     I have discussed this patient's plan of care and discharge plan at IDT rounds today with Case Management, Nursing, Nursing leadership, and other members of the IDT team.    Consultants/Specialty    Code Status  Full Code    Disposition  The patient is not medically cleared for discharge to home or a post-acute facility.      I have placed the appropriate orders for post-discharge needs.    Review of Systems  Review of Systems   Unable to perform ROS: Mental status change        Physical Exam  Temp:  [36.2 °C (97.1 °F)-36.7 °C (98.1 °F)] 36.2 °C (97.1 °F)  Pulse:  [85-91] 85  Resp:  [18-20] 20  BP: (110-144)/(69-82) 144/82  SpO2:  [92 %-95 %] 95 %    Physical Exam  Constitutional:       General: He is not in acute distress.     Appearance: He is ill-appearing. He is not toxic-appearing.   HENT:      Head:      Comments: Neck brace  Eyes:      Extraocular Movements: Extraocular movements intact.      Pupils: Pupils are equal, round, and reactive to light.   Cardiovascular:      Rate and Rhythm: Normal rate and regular rhythm.      Heart sounds: No murmur heard.     No gallop.   Pulmonary:      Effort: No respiratory distress.      Breath sounds: No stridor. No wheezing, rhonchi or rales.   Chest:      Chest wall: No tenderness.   Abdominal:      General: There is no distension.      Palpations: There is no mass.       Tenderness: There is no abdominal tenderness (no Echevarria's sign). There is no guarding.      Hernia: No hernia is present.   Musculoskeletal:         General: No swelling, tenderness or deformity.      Cervical back: Normal range of motion.      Right lower leg: No edema.      Left lower leg: No edema.   Skin:     Capillary Refill: Capillary refill takes 2 to 3 seconds.   Neurological:      General: No focal deficit present.      Motor: Weakness present.   Psychiatric:      Comments: Odd affect         Fluids    Intake/Output Summary (Last 24 hours) at 6/16/2024 1359  Last data filed at 6/16/2024 0900  Gross per 24 hour   Intake 1941.94 ml   Output 2550 ml   Net -608.06 ml         Laboratory  Recent Labs     06/14/24  0115 06/14/24  0921 06/15/24  0128   WBC  --   --  6.6   RBC  --   --  3.13*   HEMOGLOBIN 8.8* 9.2* 8.9*   HEMATOCRIT  --   --  27.0*   MCV  --   --  86.3   MCH  --   --  28.4   MCHC  --   --  33.0   RDW  --   --  41.1   PLATELETCT  --   --  275   MPV  --   --  8.3*     Recent Labs     06/15/24  0128 06/16/24  0217   SODIUM 138 136   POTASSIUM 3.4* 4.1   CHLORIDE 95* 93*   CO2 32 33   GLUCOSE 92 177*   BUN 7* 12   CREATININE 0.50 0.55   CALCIUM 7.8* 7.7*                   Imaging  [id number]   Final Result      1.  Gallbladder shows minimal dilation and trace pericholecystic fluid      2.  However, no biliary dilation      3.  Minimal splenomegaly      4.  Marked dilation of the bladder with volume of probably at thousand mL, this is consistent with urinary retention      5.  Findings were conveyed electronically to WILMER ISSA on 6/16/2024 9:23 AM.      DX-ESOPHAGUS - [id number]   Final Result      US-RUQ   Final Result      1.  Distended gallbladder containing sludge.   2.  Small portion of the common duct is visualized and is mildly dilated. Correlate with biliary labs. Concern for obstruction, further evaluation with MRCP may be performed.      DX-CHEST-PORTABLE (1 VIEW)   Final  Result      1.  Low lung volumes with left greater than right base airspace disease concerning for multifocal pneumonia.      NM-BILIARY (HIDA) SCAN WITH CCK    (Results Pending)        Assessment/Plan  * Acute on chronic anemia- (present on admission)  Assessment & Plan  Hb previously downtrending though stable at time of discharge at 8.7  Hb on admission 7.6 --> 9.1 with S/p 1 unit RBCs in ED  - Trend H&H  -  - IV pantoprazole twice daily..  if Hemoglobin remained stable transition to p.o. Prilosec  Recent Labs     06/14/24  0115 06/14/24  0921 06/15/24  0128   HEMOGLOBIN 8.8* 9.2* 8.9*   HEMATOCRIT  --   --  27.0*   MCV  --   --  86.3   MCH  --   --  28.4   PLATELETCT  --   --  275     Stable    Hypoglycemia- (present on admission)  Assessment & Plan  IV dextrose as needed    Decreased Lantus to 10 units q. evening and will continue to monitor and titrate accordingly    Transaminitis- (present on admission)  Assessment & Plan  Lab work from 6/8 showing normal LFT  Admission AST: : 318; ALP elevated 806  Total bili 0.2  CT C/A/P 5/27: Liver, gallbladder normal. No CBD dilation noted   - RUQ US pending  - Diagnostic alcohol normal  Normal Tylenol level    hepatitis panels negative  - Repeat CMP in a.m.  RUQ US was ordered followed by MRCP to clarify.   Imaging showed possible gallbladder inflammation. Extend antibiotics. Ordered HIDA scan    Anemia  Assessment & Plan  Hb previously downtrending though stable at time of discharge at 8.7  Hb on admission 7.6 --> 9.1  S/p 1 unit RBCs in ED  - Trend H&H  - Consider GI referral if Hb continues to downtrend  - Patient has a history of gastritis and esophagitis  - IV pantoprazole twice daily    Cauda equina compression (HCC)- (present on admission)  Assessment & Plan  S/p cervical and lumbar laminectomies    Cervical stenosis of spinal canal- (present on admission)  Assessment & Plan  S/p C4-C7 ACDF on 6/4 and L4-L5 laminectomies with stealth fusion 6/7  - Continue  Aspen cervical collar      It is unclear when the c-collar can be removed-patient remains in the hospital for an extended period time , may need to curbside Dr. Pulido otherwise patient should follow-up in the outpatient setting with Dr. Pulido    Multifocal pneumonia- (present on admission)  Assessment & Plan  CXR: patchy infiltrates to right middle and right lower lobes.  CT C/A/P 5/27: Patchy bilateral infiltrates, greatest in upper right lobe.  Distal esophageal wall thickening  Completed antibiotic course with ceftriaxone and doxycycline on 6/2  Likely secondary to aspiration given recent surgeries and hospitalization  -  IV Unasyn  - Aspiration precautions  - Speech therapy consult    Acute on chronic respiratory failure (HCC)- (present on admission)  Assessment & Plan  Postoperatively during prior admission patient remained on 4 LPM's  Secondary to atelectasis, recent anesthesia and pneumonia  Currently on baseline 4 LPM  - Continue to monitor  - Continuous pulse ox monitoring    Uncontrolled type 2 diabetes mellitus with hyperglycemia (HCC)- (present on admission)  Assessment & Plan  A1C 12.4  Lantus decreased to 10 units q. evening  - Continue sliding scale insulin  - Monitor BG trend.   - Accu-Cheks before meals and at bedtime.   - Goal to keep BG between 140-180 per 2019 ADA guidelines   BGs 100s, A1c 10+         VTE prophylaxis: VTE Selection    I have performed a physical exam and reviewed and updated ROS and Plan today (6/16/2024). In review of yesterday's note (6/15/2024), there are no changes except as documented above.    Patient is has a high medical complexity, complex decision making and is at high risk for complication, morbidity, and mortality, thus requiring the highest level of my preparedness for sudden, emergent intervention. Medical decision making is therefore complex. I provided  services, which included ordering labs and/or imaging, and discussing the case with various  consultants.medication orders, frequent reevaluations of the patient's condition and response to treatment. Time was also devoted to counseling and coordinating care including review of records, pertinent lab data and studies, as well as discussing diagnostic evaluation and work up, planned therapeutic interventions and future disposition of care. Where indicated, the assessment and plan reflect discussion of patient with consultants, other healthcare providers, family members, and additional research needed to obtain further information in formulating the plan of care for Christoph Taylor. Total time spent was 55 minutes.

## 2024-06-16 NOTE — PROGRESS NOTES
Report received from NOC RN and assumed patient care at 0700. Patient is A&Ox 4, on 3.5, and bed alarm on.  Patient reporting a pain level of 10. Call light within reach and bed in lowest position. Abdomen distended, pt voided 300ml, bladder scan >1820, jiménez placed per MD Acuna telephone order. Reinforced the need to call for assistance. Plan of care discussed and patient does not have any further needs at this time.

## 2024-06-16 NOTE — PROGRESS NOTES
Assumed care of patient at 1900 from day RN. Patient is A&O x 4. Bed locked in the lowest position, 2 side rails up, call light is within reach, belongings at bedside. Pt reports pain level of 6 /10 pt medicated per MAR. Mobility Max assist, unsteady. Oxygen 4 L.   Explained plan of care and safety precautions to pt, pt has no questions at this time. Hourly rounding is in place.     2030 pt transported via gurney to MRI  2100 pt returned from MRI

## 2024-06-17 ENCOUNTER — APPOINTMENT (OUTPATIENT)
Dept: RADIOLOGY | Facility: MEDICAL CENTER | Age: 59
DRG: 193 | End: 2024-06-17
Attending: INTERNAL MEDICINE
Payer: COMMERCIAL

## 2024-06-17 LAB
ALBUMIN SERPL BCP-MCNC: 2.6 G/DL (ref 3.2–4.9)
ALBUMIN/GLOB SERPL: 1 G/DL
ALP SERPL-CCNC: 285 U/L (ref 30–99)
ALT SERPL-CCNC: 48 U/L (ref 2–50)
ANION GAP SERPL CALC-SCNC: 7 MMOL/L (ref 7–16)
AST SERPL-CCNC: 19 U/L (ref 12–45)
BACTERIA BLD CULT: NORMAL
BACTERIA BLD CULT: NORMAL
BILIRUB SERPL-MCNC: 0.2 MG/DL (ref 0.1–1.5)
BUN SERPL-MCNC: 11 MG/DL (ref 8–22)
CALCIUM ALBUM COR SERPL-MCNC: 8.7 MG/DL (ref 8.5–10.5)
CALCIUM SERPL-MCNC: 7.6 MG/DL (ref 8.5–10.5)
CHLORIDE SERPL-SCNC: 93 MMOL/L (ref 96–112)
CO2 SERPL-SCNC: 34 MMOL/L (ref 20–33)
CREAT SERPL-MCNC: 0.61 MG/DL (ref 0.5–1.4)
ERYTHROCYTE [DISTWIDTH] IN BLOOD BY AUTOMATED COUNT: 39.5 FL (ref 35.9–50)
GFR SERPLBLD CREATININE-BSD FMLA CKD-EPI: 110 ML/MIN/1.73 M 2
GLOBULIN SER CALC-MCNC: 2.5 G/DL (ref 1.9–3.5)
GLUCOSE BLD STRIP.AUTO-MCNC: 180 MG/DL (ref 65–99)
GLUCOSE BLD STRIP.AUTO-MCNC: 207 MG/DL (ref 65–99)
GLUCOSE BLD STRIP.AUTO-MCNC: 234 MG/DL (ref 65–99)
GLUCOSE SERPL-MCNC: 209 MG/DL (ref 65–99)
HCT VFR BLD AUTO: 25.2 % (ref 42–52)
HGB BLD-MCNC: 8.2 G/DL (ref 14–18)
MCH RBC QN AUTO: 27.6 PG (ref 27–33)
MCHC RBC AUTO-ENTMCNC: 32.5 G/DL (ref 32.3–36.5)
MCV RBC AUTO: 84.8 FL (ref 81.4–97.8)
PLATELET # BLD AUTO: 309 K/UL (ref 164–446)
PMV BLD AUTO: 8.5 FL (ref 9–12.9)
POTASSIUM SERPL-SCNC: 4 MMOL/L (ref 3.6–5.5)
PROT SERPL-MCNC: 5.1 G/DL (ref 6–8.2)
RBC # BLD AUTO: 2.97 M/UL (ref 4.7–6.1)
SIGNIFICANT IND 70042: NORMAL
SIGNIFICANT IND 70042: NORMAL
SITE SITE: NORMAL
SITE SITE: NORMAL
SODIUM SERPL-SCNC: 134 MMOL/L (ref 135–145)
SOURCE SOURCE: NORMAL
SOURCE SOURCE: NORMAL
WBC # BLD AUTO: 7.9 K/UL (ref 4.8–10.8)

## 2024-06-17 PROCEDURE — 99232 SBSQ HOSP IP/OBS MODERATE 35: CPT | Performed by: INTERNAL MEDICINE

## 2024-06-17 PROCEDURE — 770006 HCHG ROOM/CARE - MED/SURG/GYN SEMI*

## 2024-06-17 PROCEDURE — 700102 HCHG RX REV CODE 250 W/ 637 OVERRIDE(OP): Performed by: STUDENT IN AN ORGANIZED HEALTH CARE EDUCATION/TRAINING PROGRAM

## 2024-06-17 PROCEDURE — 700101 HCHG RX REV CODE 250: Performed by: STUDENT IN AN ORGANIZED HEALTH CARE EDUCATION/TRAINING PROGRAM

## 2024-06-17 PROCEDURE — 700111 HCHG RX REV CODE 636 W/ 250 OVERRIDE (IP): Performed by: STUDENT IN AN ORGANIZED HEALTH CARE EDUCATION/TRAINING PROGRAM

## 2024-06-17 PROCEDURE — A9270 NON-COVERED ITEM OR SERVICE: HCPCS | Performed by: STUDENT IN AN ORGANIZED HEALTH CARE EDUCATION/TRAINING PROGRAM

## 2024-06-17 PROCEDURE — 94640 AIRWAY INHALATION TREATMENT: CPT

## 2024-06-17 PROCEDURE — 94760 N-INVAS EAR/PLS OXIMETRY 1: CPT

## 2024-06-17 PROCEDURE — 94669 MECHANICAL CHEST WALL OSCILL: CPT

## 2024-06-17 PROCEDURE — 36415 COLL VENOUS BLD VENIPUNCTURE: CPT

## 2024-06-17 PROCEDURE — A9270 NON-COVERED ITEM OR SERVICE: HCPCS | Performed by: INTERNAL MEDICINE

## 2024-06-17 PROCEDURE — C9113 INJ PANTOPRAZOLE SODIUM, VIA: HCPCS | Performed by: STUDENT IN AN ORGANIZED HEALTH CARE EDUCATION/TRAINING PROGRAM

## 2024-06-17 PROCEDURE — 80053 COMPREHEN METABOLIC PANEL: CPT

## 2024-06-17 PROCEDURE — 700101 HCHG RX REV CODE 250: Performed by: HOSPITALIST

## 2024-06-17 PROCEDURE — 82962 GLUCOSE BLOOD TEST: CPT

## 2024-06-17 PROCEDURE — 700102 HCHG RX REV CODE 250 W/ 637 OVERRIDE(OP): Performed by: INTERNAL MEDICINE

## 2024-06-17 PROCEDURE — 85027 COMPLETE CBC AUTOMATED: CPT

## 2024-06-17 PROCEDURE — 700105 HCHG RX REV CODE 258: Performed by: STUDENT IN AN ORGANIZED HEALTH CARE EDUCATION/TRAINING PROGRAM

## 2024-06-17 RX ADMIN — IPRATROPIUM BROMIDE AND ALBUTEROL SULFATE 3 ML: 2.5; .5 SOLUTION RESPIRATORY (INHALATION) at 19:36

## 2024-06-17 RX ADMIN — GABAPENTIN 100 MG: 100 CAPSULE ORAL at 11:36

## 2024-06-17 RX ADMIN — OXYCODONE HYDROCHLORIDE 10 MG: 10 TABLET ORAL at 12:06

## 2024-06-17 RX ADMIN — PANTOPRAZOLE SODIUM 40 MG: 40 INJECTION, POWDER, FOR SOLUTION INTRAVENOUS at 17:11

## 2024-06-17 RX ADMIN — GABAPENTIN 100 MG: 100 CAPSULE ORAL at 17:06

## 2024-06-17 RX ADMIN — PANTOPRAZOLE SODIUM 40 MG: 40 INJECTION, POWDER, FOR SOLUTION INTRAVENOUS at 05:14

## 2024-06-17 RX ADMIN — INSULIN HUMAN 2 UNITS: 100 INJECTION, SOLUTION PARENTERAL at 17:06

## 2024-06-17 RX ADMIN — SODIUM CHLORIDE: 9 INJECTION, SOLUTION INTRAVENOUS at 20:10

## 2024-06-17 RX ADMIN — OXYCODONE HYDROCHLORIDE 5 MG: 5 TABLET ORAL at 20:08

## 2024-06-17 RX ADMIN — GABAPENTIN 100 MG: 100 CAPSULE ORAL at 05:14

## 2024-06-17 RX ADMIN — SENNOSIDES AND DOCUSATE SODIUM 2 TABLET: 50; 8.6 TABLET ORAL at 17:06

## 2024-06-17 RX ADMIN — INSULIN HUMAN 1 UNITS: 100 INJECTION, SOLUTION PARENTERAL at 11:33

## 2024-06-17 RX ADMIN — INSULIN GLARGINE-YFGN 10 UNITS: 100 INJECTION, SOLUTION SUBCUTANEOUS at 17:05

## 2024-06-17 RX ADMIN — LIDOCAINE 2 PATCH: 4 PATCH TOPICAL at 05:15

## 2024-06-17 RX ADMIN — INSULIN HUMAN 2 UNITS: 100 INJECTION, SOLUTION PARENTERAL at 20:06

## 2024-06-17 RX ADMIN — OXYCODONE HYDROCHLORIDE 10 MG: 10 TABLET ORAL at 17:08

## 2024-06-17 ASSESSMENT — PAIN DESCRIPTION - PAIN TYPE
TYPE: ACUTE PAIN

## 2024-06-17 NOTE — PROGRESS NOTES
Assumed care of patient at 1900 from day RN. Patient is A&O x 4. Bed locked in the lowest position, 3 side rails up, call light is within reach, belongings at bedside. Pt reports pain level of 0 /10. Mobility max assist.   Oxygen 4 L which is baseline. Feloy catheter in place for retention. Explained plan of care and safety precautions to pt, pt has no questions at this time. Hourly rounding is in place.

## 2024-06-17 NOTE — DISCHARGE PLANNING
Case Management Discharge Planning    Admission Date: 6/12/2024  GMLOS: 4.1  ALOS: 3    6-Clicks ADL Score: 17  6-Clicks Mobility Score: 18  PT and/or OT Eval ordered: Yes  Post-acute Referrals Ordered: Yes  Post-acute Choice Obtained: Yes  Has referral(s) been sent to post-acute provider:  Yes      Anticipated Discharge Dispo: Discharge Disposition: D/T to SNF with Medicare cert in anticipation of skilled care (03)  [place]    DME Needed: none    Action(s) Taken: Discussed with IDT, per MD, pt is pending a HIDA SCAN.     CM met with pt and asked if he is willing to return to [facility] and pt confirmed that he wants to return since he is not ready for home.     CM called Mario at [facility] and she confirmed that they will accept him. She is waiting for insurance auth.     Escalations Completed: n/a    Medically Clear: no-HIDA scan pending.    Next Steps: follow up with MD tomorrow and follow up with Mario at [facility].    Barriers to Discharge: medical clearance and insurance auth for [facility].     Is the patient up for discharge tomorrow: hopefully by tomorrow.

## 2024-06-17 NOTE — PROGRESS NOTES
HIDA scan reviewed.  No evidence of cholecystitis/duct obstruction.  He has a reduced ejection fraction but his clinical presentation is not consistent for symptomatic gallbladder dyskinesia nor is he indicated for urgent cholecystectomy for this diagnosis.  ACS gray will sign off.  He can follow up at the [facility] clinic and we can help arrange follow up for elective cholecystectomy if he would desire it.  Advance diet, remainder of care per primary team.    Jasen Salazar MD

## 2024-06-17 NOTE — PROGRESS NOTES
0800: Assumed care of patient at 0700. Received report from night shift RN. Pt is A&O x 4, on 3L NC. Reporting a pain level of 4/10. Assessment completed and VSS. Bowel sounds are active in all 4 quadrants. Lung sounds are diminished in bases. Bed is in lowest position, bed alarm on, and call light is within reach. Fall precautions are in place.

## 2024-06-17 NOTE — CARE PLAN
Problem: Hyperinflation  Goal: Prevent or improve atelectasis  Description: Target End Date:  3 to 4 days    1. Instruct incentive spirometry usage  2.  Perform hyperinflation therapy as indicated  Outcome: Progressing   Patient is receiving Pep BID; IS value 2000

## 2024-06-17 NOTE — PROGRESS NOTES
Attempted to see this AM.  At radiology currently.  Overnight events and lab data reviewed.  Will updated recommendations based on HIDA results.    Jasen Salazar MD

## 2024-06-17 NOTE — THERAPY
Physical Therapy Contact Note    Patient Name: Christoph Taylor  Age:  59 y.o., Sex:  male  Medical Record #: [id number]  Today's Date: 6/17/2024 06/17/24 0956   Interdisciplinary Plan of Care Collaboration   Collaboration Comments PT tx attempted, politely declined at this time due to just returning from scan.  Will round back as able.        There is 1 Wet Read(s) to document.

## 2024-06-17 NOTE — CARE PLAN
The patient is Stable - Low risk of patient condition declining or worsening    Shift Goals  Clinical Goals: Pt will remain free from falls, get HIDA scan  Patient Goals: sleep  Family Goals: KATHERINE    Progress made toward(s) clinical / shift goals:    Pt remained free from falls throughout shift. Bed is locked and in lowest position. Call light and personal belongings are within reach.  Pt received HIDA scan during shift.      Problem: Pain - Standard  Goal: Alleviation of pain or a reduction in pain to the patient’s comfort goal  Outcome: Progressing     Problem: Skin Integrity  Goal: Skin integrity is maintained or improved  Outcome: Progressing     Problem: Fall Risk  Goal: Patient will remain free from falls  Outcome: Progressing       Patient is not progressing towards the following goals:

## 2024-06-17 NOTE — CARE PLAN
The patient is Stable - Low risk of patient condition declining or worsening    Shift Goals  Clinical Goals: Pt will remain free from falls and pt will be NPO at midnight this shift  Patient Goals: sleep  Family Goals: KATHERINE    Progress made toward(s) clinical / shift goals:  Pt remained NPO after midnight, pt remained free from falls this shift.    Problem: Pain - Standard  Goal: Alleviation of pain or a reduction in pain to the patient’s comfort goal  Outcome: Progressing     Problem: Fall Risk  Goal: Patient will remain free from falls  Outcome: Progressing     Patient is not progressing towards the following goals:

## 2024-06-17 NOTE — CONSULTS
CHIEF COMPLAINT:  abdominal pain      Consulting physician- Werner Acuna MD    HISTORY OF PRESENT ILLNESS: The patient is a 59 year-old White man who is admitted to the hospital with low blood pressure and some confusion following C4-C7 ACDF on 6/4 and L4-L5 laminectomies with stealth fusion 6/7.  Since that time he has been convalescing on the floor.  He began having abdominal pain mostly on the left side and in the lower abdomen.  He has a normal white blood cell count as of yesterday with no CBC today. He had mild elevation of his AST and ALT 2 days ago but has not had a CMP today.  Because of the mild elevation of his AST and ALT he had a MRI.  On his MRI he has some gallbladder wall thickening and pericholecystic fluid.  He denies nausea vomiting.  He is tolerating a diet.  On exam his pain is mostly in the left lower quadrant and in his back.      PAST MEDICAL HISTORY:  has a past medical history of Abnormal electrocardiogram (ECG) (EKG) (11/8/2021), KRISTAL (acute kidney injury) ([facility]) (11/8/2021), Chest pain in adult (11/8/2021), CKD (chronic kidney disease) stage 3, GFR 30-59 ml/min (11/7/2021), Diabetes ([facility]), Diabetic ketoacidosis without coma associated with type 2 diabetes mellitus ([facility]) (11/7/2021), Diarrhea (3/18/2022), DKA, type 1, not at goal ([facility]) (7/28/2022), Esophagitis (7/28/2022), [place] (hard of hearing), and Hypercholesteremia.    PAST SURGICAL HISTORY:  has a past surgical history that includes other; pr upper gi endoscopy,diagnosis (N/A, 3/14/2023); finger or hand incision and drainage (Right, 6/12/2023); cervical disk and fusion anterior (N/A, 6/4/2024); fusion, spine, lumbar, plif (N/A, 6/7/2024); and lumbar laminectomy diskectomy (N/A, 6/7/2024).  Appendectomy    ALLERGIES: No Known Allergies    CURRENT MEDICATIONS:    Home Medications       Reviewed by Lamar Arguello (Pharmacy Tech) on 06/12/24 at 1727  Med List Status: Complete     Medication Last Dose Status   acetaminophen (TYLENOL)  325 MG Tab prn Active   aspirin 81 MG EC tablet 6/12/2024 Active   cyanocobalamin (VITAMIN B-12) 500 MCG Tab 6/12/2024 Active   diphenhydrAMINE (BENADRYL) 25 MG Tab prn Active   docusate sodium 100 MG Cap 6/12/2024 Active   insulin GLARGINE (LANTUS,SEMGLEE) 100 UNIT/ML Solution 6/12/2024 Active   insulin regular (HUMULIN R) 100 Unit/mL Solution 6/12/2024 Active   ipratropium-albuterol (DUONEB) 0.5-2.5 (3) MG/3ML nebulizer solution 6/12/2024 Active   lidocaine (ASPERFLEX) 4 % Patch 6/12/2024 Active   magnesium hydroxide (MILK OF MAGNESIA) 400 MG/5ML Suspension prn Active   melatonin 5 mg Tab prn Active   methocarbamol (ROBAXIN) 500 MG Tab prn Active   omeprazole (PRILOSEC) 20 MG delayed-release capsule 6/12/2024 Active   ondansetron (ZOFRAN ODT) 4 MG TABLET DISPERSIBLE prn Active   oxyCODONE immediate release (ROXICODONE) 10 MG immediate release tablet 6/12/2024 Active   oxyCODONE immediate-release (ROXICODONE) 5 MG Tab 6/11/2024 Active   senna-docusate (PERICOLACE OR SENOKOT S) 8.6-50 MG Tab 6/11/2024 Active   venlafaxine XR (EFFEXOR XR) 75 MG CAPSULE SR 24 HR 6/12/2024 Active   vitamin D3 2000 UNIT Tab 6/12/2024 Active                  Audit from Redirected Encounters    **Home medications have not yet been reviewed for this encounter**         FAMILY HISTORY: family history includes Heart Disease in his father.    SOCIAL HISTORY:  reports that he has quit smoking. He has never used smokeless tobacco. He reports that he does not currently use alcohol. He reports that he does not currently use drugs.    REVIEW OF SYSTEMS: Comprehensive review of systems is negative with the exception of the aforementioned HPI, PMH, and PSH bullets in accordance with CMS guidelines.    PHYSICAL EXAMINATION:      Constitutional:     Vital Signs: /70   Pulse 95   Temp 37.1 °C (98.7 °F) (Temporal)   Resp 20   Ht 1.829 m (6')   Wt 76.3 kg (168 lb 3.4 oz)   SpO2 91%    General Appearance: appears stated age, is in no apparent  distress.  HEENT: The pupils are equal, round, and reactive to light bilaterally. The extraocular muscles are intact bilaterally. The sclera are not icteric. Nares and oropharynx are clear.   Neck: Supple. No adenopathy.  Respiratory:   Inspection: Unlabored respirations, no intercostal retractions, paradoxical motion, or accessory muscle use.   Auscultation: normal.  Cardiovascular:   Inspection: The skin is warm and dry.  Auscultation: Regular rate and rhythm.   Peripheral Pulses: Normal.   Abdomen:  Inspection: Abdominal inspection reveals no abrasions, contusions, lacerations or penetrating wounds.   Palpation: Palpation is remarkable for mild tenderness in the left lower quadrant. No abdominal wall hernias.  Extremities:   Examination of the upper and lower extremities demonstrates no cyanosis edema or clubbing.  Neurologic:   Alert & oriented to person, time and place. Normal motor function. Normal sensory function. No focal deficits noted.    LABORATORY VALUES:   Recent Labs     06/14/24  0115 06/14/24  0921 06/15/24  0128   WBC  --   --  6.6   RBC  --   --  3.13*   HEMOGLOBIN 8.8* 9.2* 8.9*   HEMATOCRIT  --   --  27.0*   MCV  --   --  86.3   MCH  --   --  28.4   MCHC  --   --  33.0   RDW  --   --  41.1   PLATELETCT  --   --  275   MPV  --   --  8.3*     Recent Labs     06/15/24  0128 06/16/24  0217   SODIUM 138 136   POTASSIUM 3.4* 4.1   CHLORIDE 95* 93*   CO2 32 33   GLUCOSE 92 177*   BUN 7* 12   CREATININE 0.50 0.55   CALCIUM 7.8* 7.7*     Recent Labs     06/14/24  1159 06/15/24  0128   ASTSGOT  --  31   ALTSGPT  --  93*   TBILIRUBIN  --  0.4   ALKPHOSPHAT  --  405*   GLOBULIN  --  2.9   AMMONIA 24  --             IMAGING:   [id number]   Final Result      1.  Gallbladder shows minimal dilation and trace pericholecystic fluid      2.  However, no biliary dilation      3.  Minimal splenomegaly      4.  Marked dilation of the bladder with volume of probably at thousand mL, this is consistent with urinary  retention      5.  Findings were conveyed electronically to WILMER ISSA on 6/16/2024 9:23 AM.      DX-ESOPHAGUS - [id number]   Final Result      US-RUQ   Final Result      1.  Distended gallbladder containing sludge.   2.  Small portion of the common duct is visualized and is mildly dilated. Correlate with biliary labs. Concern for obstruction, further evaluation with MRCP may be performed.      DX-CHEST-PORTABLE (1 VIEW)   Final Result      1.  Low lung volumes with left greater than right base airspace disease concerning for multifocal pneumonia.      NM-BILIARY (HIDA) SCAN WITH CCK    (Results Pending)       ASSESSMENT AND PLAN:     No new Assessment & Plan notes have been filed under this hospital service since the last note was generated.  Service: Surgery General    Abdominal pain-mild elevation in transaminases which appears to be resolving although today there is no CMP or CBC.  Labs are ordered for the morning.  A HIDA scan is ordered for evaluation of the gallbladder function.  The patient does not have a Echevarria sign on exam or even tenderness in his right upper quadrant.  Will follow along for further evaluation of HIDA scan.        DISPOSITION: Medical evaluation and admission. The patient was admitted to the Medical Service prior to surgical consultation. [facility] Acute Care Surgery [place] Service will follow.     ____________________________________     Reba Lambert M.D.    DD: 6/16/2024  8:25 PM

## 2024-06-17 NOTE — PROGRESS NOTES
[facility] Medicine Daily Progress Note    Date of Service  6/17/2024    Chief Complaint  Christoph Taylor is a 59 y.o. male admitted 6/12/2024 with low blood pressure    Hospital Course  Kevin Taylor is a 59-year-old male with a PMHx DMT2, GERD with esophagitis, chronic back pain.  Patient presented to the emergency room 6/12 at the request of his [employer] support person.  He states that they called him on the phone and patient did not sound like himself and they requested emergency room visit.       Patient was admitted [facility] 5/27 through 6/11 for quite equina syndrome.  He is S/p C4-C7 ACDF on 6/4 and L4-L5 laminectomies with stealth fusion 6/7. Patient was discharged from [facility] 6/11 to [facility].  It was very unclear from ED notes and from patient if he was at [place] or if he had returned home sometime in the last 24 hours.  He states that his sister had groceries delivered to him at home.  He has intermittently been confused in the emergency room.     Regardless, patient comes in with new elevation of LFTs and anemia.  He received 1 unit RBCs in the emergency room.       Interval Problem Update  Patient seen and examine at bedside  I reviewed the chart along with vitals, labs, imaging, test (both pending and resulted) and recommendations from specialists and interdisciplinary team.    Managing confusion due to  low blood pressure/anemia, transaminitis, hypoglycemia, pneumonia and chronic narcotics for recent cervical and lumbar laminectomies.  Received transfusion. Hb stable 9s. Ordered occult stool and iron panel.  Reducing Lantus however last A1c is 12.4.  Ordered ammonia levels  RUQ 6/13 showed distended gallbladder with sludge. MRCP recommended thus ordered and came back no CBD dilation but gallbladder slightly dilated with some pericholecystic fluid. Extended antibiotics and ordered HIDA scan, consulted Dr. Lambert, Gen Surgery. Diet is currently minced and moist. HIDA scan  came back NO cholecystitis, gallbladder dyskinesia. Due to absence of symptoms, normal hepatobiliary enzymes and tolerating diet, moving bowels, NO cholecystectomy indicated per Dr. Salazar, SUrgery.     Mild improvement of bladder and bowel function after lumbar surgery, no change in upper extremity pain and weakness after cervical surgery. Started gabapentin for pain control. Given his overall pain difficult to assess if he has abdominal pain but he is tolerating diet currently.  PT/OT, question if he just recently discharged from [facility] but he is not thrilled about it, but currently this is the plan.     I have discussed this patient's plan of care and discharge plan at IDT rounds today with Case Management, Nursing, Nursing leadership, and other members of the IDT team.    Consultants/Specialty    Code Status  Full Code    Disposition  Medically Cleared  I have placed the appropriate orders for post-discharge needs.    Review of Systems  Review of Systems   Unable to perform ROS: Mental status change        Physical Exam  Temp:  [36.1 °C (96.9 °F)-37.1 °C (98.7 °F)] 36.7 °C (98 °F)  Pulse:  [86-96] 86  Resp:  [20] 20  BP: (127-145)/(70-82) 145/82  SpO2:  [91 %-96 %] 95 %    Physical Exam  Constitutional:       General: He is not in acute distress.     Appearance: He is ill-appearing. He is not toxic-appearing.   HENT:      Head:      Comments: Neck brace  Eyes:      Extraocular Movements: Extraocular movements intact.      Pupils: Pupils are equal, round, and reactive to light.   Cardiovascular:      Rate and Rhythm: Normal rate and regular rhythm.      Heart sounds: No murmur heard.     No gallop.   Pulmonary:      Effort: No respiratory distress.      Breath sounds: No stridor. No wheezing, rhonchi or rales.   Chest:      Chest wall: No tenderness.   Abdominal:      General: There is no distension.      Palpations: There is no mass.      Tenderness: There is no abdominal tenderness (no Echevarria's sign).  There is no guarding.      Hernia: No hernia is present.   Musculoskeletal:         General: No swelling, tenderness or deformity.      Cervical back: Normal range of motion.      Right lower leg: No edema.      Left lower leg: No edema.   Skin:     Capillary Refill: Capillary refill takes 2 to 3 seconds.   Neurological:      General: No focal deficit present.      Motor: Weakness present.   Psychiatric:      Comments: Odd affect         Fluids    Intake/Output Summary (Last 24 hours) at 6/17/2024 1620  Last data filed at 6/17/2024 1202  Gross per 24 hour   Intake 1726.03 ml   Output 3450 ml   Net -1723.97 ml         Laboratory  Recent Labs     06/15/24  0128 06/17/24  0112   WBC 6.6 7.9   RBC 3.13* 2.97*   HEMOGLOBIN 8.9* 8.2*   HEMATOCRIT 27.0* 25.2*   MCV 86.3 84.8   MCH 28.4 27.6   MCHC 33.0 32.5   RDW 41.1 39.5   PLATELETCT 275 309   MPV 8.3* 8.5*     Recent Labs     06/15/24  0128 06/16/24  0217 06/17/24  0112   SODIUM 138 136 134*   POTASSIUM 3.4* 4.1 4.0   CHLORIDE 95* 93* 93*   CO2 32 33 34*   GLUCOSE 92 177* 209*   BUN 7* 12 11   CREATININE 0.50 0.55 0.61   CALCIUM 7.8* 7.7* 7.6*                   Imaging  NM-BILIARY (HIDA) SCAN WITH CCK   Final Result      1. No evidence of acute cholecystitis.   2. Gallbladder dyskinesia.      Normal gallbladder ejection fraction is 38% or greater.      [id number]   Final Result      1.  Gallbladder shows minimal dilation and trace pericholecystic fluid      2.  However, no biliary dilation      3.  Minimal splenomegaly      4.  Marked dilation of the bladder with volume of probably at thousand mL, this is consistent with urinary retention      5.  Findings were conveyed electronically to WILMER ISSA on 6/16/2024 9:23 AM.      DX-ESOPHAGUS - [id number]   Final Result      US-RUQ   Final Result      1.  Distended gallbladder containing sludge.   2.  Small portion of the common duct is visualized and is mildly dilated. Correlate with biliary labs. Concern for  obstruction, further evaluation with MRCP may be performed.      DX-CHEST-PORTABLE (1 VIEW)   Final Result      1.  Low lung volumes with left greater than right base airspace disease concerning for multifocal pneumonia.           Assessment/Plan  * Acute on chronic anemia- (present on admission)  Assessment & Plan  Hb previously downtrending though stable at time of discharge at 8.7  Hb on admission 7.6 --> 9.1 with S/p 1 unit RBCs in ED  - Trend H&H  -  - IV pantoprazole twice daily..  if Hemoglobin remained stable transition to p.o. Prilosec  Recent Labs     06/15/24  0128 06/17/24  0112   HEMOGLOBIN 8.9* 8.2*   HEMATOCRIT 27.0* 25.2*   MCV 86.3 84.8   MCH 28.4 27.6   PLATELETCT 275 309     Stable  Iron panel consistent with chronic disease    Hypoglycemia- (present on admission)  Assessment & Plan  IV dextrose as needed    Decreased Lantus to 10 units q. evening and will continue to monitor and titrate accordingly    Transaminitis- (present on admission)  Assessment & Plan  Lab work from 6/8 showing normal LFT  Admission AST: : 318; ALP elevated 806  Total bili 0.2  CT C/A/P 5/27: Liver, gallbladder normal. No CBD dilation noted   - RUQ US pending  - Diagnostic alcohol normal  Normal Tylenol level    hepatitis panels negative  - Repeat CMP in a.m.  RUQ US was ordered followed by MRCP to clarify.   Imaging showed possible gallbladder inflammation. Extend antibiotics. Ordered HIDA scan    Anemia  Assessment & Plan  Hb previously downtrending though stable at time of discharge at 8.7  Hb on admission 7.6 --> 9.1  S/p 1 unit RBCs in ED  - Trend H&H  - Consider GI referral if Hb continues to downtrend  - Patient has a history of gastritis and esophagitis  - IV pantoprazole twice daily    Cauda equina compression (HCC)- (present on admission)  Assessment & Plan  S/p cervical and lumbar laminectomies    Cervical stenosis of spinal canal- (present on admission)  Assessment & Plan  S/p C4-C7 ACDF on 6/4 and L4-L5  laminectomies with stealth fusion 6/7  - Continue Aspen cervical collar      It is unclear when the c-collar can be removed-patient remains in the hospital for an extended period time , may need to curbside Dr. Pulido otherwise patient should follow-up in the outpatient setting with Dr. Pulido    Multifocal pneumonia- (present on admission)  Assessment & Plan  CXR: patchy infiltrates to right middle and right lower lobes.  CT C/A/P 5/27: Patchy bilateral infiltrates, greatest in upper right lobe.  Distal esophageal wall thickening  Completed antibiotic course with ceftriaxone and doxycycline on 6/2  Likely secondary to aspiration given recent surgeries and hospitalization  -  IV Unasyn  - Aspiration precautions  - Speech therapy consult    Acute on chronic respiratory failure (HCC)- (present on admission)  Assessment & Plan  Postoperatively during prior admission patient remained on 4 LPM's  Secondary to atelectasis, recent anesthesia and pneumonia  Currently on baseline 4 LPM  - Continue to monitor  - Continuous pulse ox monitoring    Uncontrolled type 2 diabetes mellitus with hyperglycemia (HCC)- (present on admission)  Assessment & Plan  A1C 12.4  Lantus decreased to 10 units q. evening  - Continue sliding scale insulin  - Monitor BG trend.   - Accu-Cheks before meals and at bedtime.   - Goal to keep BG between 140-180 per 2019 ADA guidelines   BGs 100s, A1c 10+         VTE prophylaxis: SCDs, montior Hb but consider DVT chemoprophylaxis if stable and no bleed.     I have performed a physical exam and reviewed and updated ROS and Plan today (6/17/2024). In review of yesterday's note (6/16/2024), there are no changes except as documented above.

## 2024-06-18 VITALS
BODY MASS INDEX: 22.78 KG/M2 | HEART RATE: 77 BPM | OXYGEN SATURATION: 97 % | HEIGHT: 72 IN | TEMPERATURE: 97 F | DIASTOLIC BLOOD PRESSURE: 83 MMHG | WEIGHT: 168.21 LBS | SYSTOLIC BLOOD PRESSURE: 134 MMHG | RESPIRATION RATE: 13 BRPM

## 2024-06-18 LAB
ALBUMIN SERPL BCP-MCNC: 2.7 G/DL (ref 3.2–4.9)
ALBUMIN/GLOB SERPL: 1 G/DL
ALP SERPL-CCNC: 280 U/L (ref 30–99)
ALT SERPL-CCNC: 34 U/L (ref 2–50)
ANION GAP SERPL CALC-SCNC: 8 MMOL/L (ref 7–16)
AST SERPL-CCNC: 11 U/L (ref 12–45)
BILIRUB SERPL-MCNC: 0.2 MG/DL (ref 0.1–1.5)
BUN SERPL-MCNC: 9 MG/DL (ref 8–22)
CALCIUM ALBUM COR SERPL-MCNC: 9 MG/DL (ref 8.5–10.5)
CALCIUM SERPL-MCNC: 8 MG/DL (ref 8.5–10.5)
CHLORIDE SERPL-SCNC: 95 MMOL/L (ref 96–112)
CO2 SERPL-SCNC: 35 MMOL/L (ref 20–33)
CREAT SERPL-MCNC: 0.5 MG/DL (ref 0.5–1.4)
ERYTHROCYTE [DISTWIDTH] IN BLOOD BY AUTOMATED COUNT: 41.7 FL (ref 35.9–50)
GFR SERPLBLD CREATININE-BSD FMLA CKD-EPI: 117 ML/MIN/1.73 M 2
GLOBULIN SER CALC-MCNC: 2.8 G/DL (ref 1.9–3.5)
GLUCOSE BLD STRIP.AUTO-MCNC: 206 MG/DL (ref 65–99)
GLUCOSE BLD STRIP.AUTO-MCNC: 211 MG/DL (ref 65–99)
GLUCOSE SERPL-MCNC: 165 MG/DL (ref 65–99)
HCT VFR BLD AUTO: 28.9 % (ref 42–52)
HGB BLD-MCNC: 9 G/DL (ref 14–18)
MCH RBC QN AUTO: 27.4 PG (ref 27–33)
MCHC RBC AUTO-ENTMCNC: 31.1 G/DL (ref 32.3–36.5)
MCV RBC AUTO: 87.8 FL (ref 81.4–97.8)
PHOSPHATE SERPL-MCNC: 2.6 MG/DL (ref 2.5–4.5)
PLATELET # BLD AUTO: 329 K/UL (ref 164–446)
PMV BLD AUTO: 8.5 FL (ref 9–12.9)
POTASSIUM SERPL-SCNC: 3.7 MMOL/L (ref 3.6–5.5)
PROT SERPL-MCNC: 5.5 G/DL (ref 6–8.2)
RBC # BLD AUTO: 3.29 M/UL (ref 4.7–6.1)
SODIUM SERPL-SCNC: 138 MMOL/L (ref 135–145)
WBC # BLD AUTO: 7.9 K/UL (ref 4.8–10.8)

## 2024-06-18 PROCEDURE — 36415 COLL VENOUS BLD VENIPUNCTURE: CPT

## 2024-06-18 PROCEDURE — 700102 HCHG RX REV CODE 250 W/ 637 OVERRIDE(OP): Performed by: STUDENT IN AN ORGANIZED HEALTH CARE EDUCATION/TRAINING PROGRAM

## 2024-06-18 PROCEDURE — C9113 INJ PANTOPRAZOLE SODIUM, VIA: HCPCS | Performed by: STUDENT IN AN ORGANIZED HEALTH CARE EDUCATION/TRAINING PROGRAM

## 2024-06-18 PROCEDURE — 700111 HCHG RX REV CODE 636 W/ 250 OVERRIDE (IP): Performed by: INTERNAL MEDICINE

## 2024-06-18 PROCEDURE — 700102 HCHG RX REV CODE 250 W/ 637 OVERRIDE(OP): Performed by: INTERNAL MEDICINE

## 2024-06-18 PROCEDURE — 82962 GLUCOSE BLOOD TEST: CPT

## 2024-06-18 PROCEDURE — 99239 HOSP IP/OBS DSCHRG MGMT >30: CPT | Performed by: STUDENT IN AN ORGANIZED HEALTH CARE EDUCATION/TRAINING PROGRAM

## 2024-06-18 PROCEDURE — 84100 ASSAY OF PHOSPHORUS: CPT

## 2024-06-18 PROCEDURE — A9270 NON-COVERED ITEM OR SERVICE: HCPCS | Performed by: STUDENT IN AN ORGANIZED HEALTH CARE EDUCATION/TRAINING PROGRAM

## 2024-06-18 PROCEDURE — 700111 HCHG RX REV CODE 636 W/ 250 OVERRIDE (IP): Performed by: STUDENT IN AN ORGANIZED HEALTH CARE EDUCATION/TRAINING PROGRAM

## 2024-06-18 PROCEDURE — 85027 COMPLETE CBC AUTOMATED: CPT

## 2024-06-18 PROCEDURE — 700101 HCHG RX REV CODE 250: Performed by: STUDENT IN AN ORGANIZED HEALTH CARE EDUCATION/TRAINING PROGRAM

## 2024-06-18 PROCEDURE — 80053 COMPREHEN METABOLIC PANEL: CPT

## 2024-06-18 PROCEDURE — 700105 HCHG RX REV CODE 258: Performed by: STUDENT IN AN ORGANIZED HEALTH CARE EDUCATION/TRAINING PROGRAM

## 2024-06-18 PROCEDURE — A9270 NON-COVERED ITEM OR SERVICE: HCPCS | Performed by: INTERNAL MEDICINE

## 2024-06-18 PROCEDURE — 51798 US URINE CAPACITY MEASURE: CPT

## 2024-06-18 RX ORDER — TAMSULOSIN HYDROCHLORIDE 0.4 MG/1
0.4 CAPSULE ORAL DAILY
Qty: 30 CAPSULE | Refills: 0
Start: 2024-06-18

## 2024-06-18 RX ORDER — OMEPRAZOLE 40 MG/1
40 CAPSULE, DELAYED RELEASE ORAL DAILY
Qty: 30 CAPSULE | Refills: 0 | Status: SHIPPED | OUTPATIENT
Start: 2024-06-18

## 2024-06-18 RX ORDER — TAMSULOSIN HYDROCHLORIDE 0.4 MG/1
0.4 CAPSULE ORAL
Status: DISCONTINUED | OUTPATIENT
Start: 2024-06-18 | End: 2024-06-18 | Stop reason: HOSPADM

## 2024-06-18 RX ORDER — TAMSULOSIN HYDROCHLORIDE 0.4 MG/1
0.4 CAPSULE ORAL
Qty: 30 CAPSULE | Refills: 0
Start: 2024-06-18 | End: 2024-06-18

## 2024-06-18 RX ORDER — GABAPENTIN 100 MG/1
100 CAPSULE ORAL 3 TIMES DAILY
Qty: 90 CAPSULE | Refills: 0
Start: 2024-06-18

## 2024-06-18 RX ORDER — LIDOCAINE 4 G/G
1 PATCH TOPICAL EVERY 24 HOURS
Qty: 1 PATCH | Refills: 0
Start: 2024-06-18

## 2024-06-18 RX ORDER — OXYCODONE HYDROCHLORIDE 10 MG/1
10 TABLET ORAL EVERY 6 HOURS PRN
Qty: 20 TABLET | Refills: 0 | Status: SHIPPED | OUTPATIENT
Start: 2024-06-18 | End: 2024-06-23

## 2024-06-18 RX ORDER — ACETAMINOPHEN 325 MG/1
650 TABLET ORAL EVERY 4 HOURS PRN
Qty: 30 TABLET | Refills: 0
Start: 2024-06-18

## 2024-06-18 RX ADMIN — VENLAFAXINE HYDROCHLORIDE 75 MG: 75 CAPSULE, EXTENDED RELEASE ORAL at 08:03

## 2024-06-18 RX ADMIN — INSULIN HUMAN 2 UNITS: 100 INJECTION, SOLUTION PARENTERAL at 08:02

## 2024-06-18 RX ADMIN — INSULIN HUMAN 2 UNITS: 100 INJECTION, SOLUTION PARENTERAL at 12:25

## 2024-06-18 RX ADMIN — OXYCODONE HYDROCHLORIDE 10 MG: 10 TABLET ORAL at 03:29

## 2024-06-18 RX ADMIN — HYDROMORPHONE HYDROCHLORIDE 1 MG: 1 INJECTION, SOLUTION INTRAMUSCULAR; INTRAVENOUS; SUBCUTANEOUS at 04:50

## 2024-06-18 RX ADMIN — GABAPENTIN 100 MG: 100 CAPSULE ORAL at 12:26

## 2024-06-18 RX ADMIN — OXYCODONE HYDROCHLORIDE 10 MG: 10 TABLET ORAL at 09:21

## 2024-06-18 RX ADMIN — LIDOCAINE 2 PATCH: 4 PATCH TOPICAL at 04:50

## 2024-06-18 RX ADMIN — GABAPENTIN 100 MG: 100 CAPSULE ORAL at 04:50

## 2024-06-18 RX ADMIN — SODIUM CHLORIDE 1000 ML: 9 INJECTION, SOLUTION INTRAVENOUS at 04:59

## 2024-06-18 RX ADMIN — PANTOPRAZOLE SODIUM 40 MG: 40 INJECTION, POWDER, FOR SOLUTION INTRAVENOUS at 04:51

## 2024-06-18 ASSESSMENT — PAIN DESCRIPTION - PAIN TYPE
TYPE: ACUTE PAIN

## 2024-06-18 NOTE — THERAPY
Physical Therapy Contact Note    Patient Name: Christoph Taylor  Age:  59 y.o., Sex:  male  Medical Record #: [id number]  Today's Date: 6/18/2024 06/18/24 0852   Interdisciplinary Plan of Care Collaboration   Collaboration Comments PT tx attempted, pt asked for time to eat his breakfast first.  Will round back as able/appropriate.

## 2024-06-18 NOTE — DISCHARGE PLANNING
Spoke To: Pema  Agency/Facility Name: [facility]  Plan or Request: Auth approved. Bed available today, transport time TBD.

## 2024-06-18 NOTE — PROGRESS NOTES
0800: Assumed care of patient at 0700. Received report from night shift RN. Pt is A&O x 4, on 3L NC. Reporting a pain level of 8/10, medicated with oxycodone per MAR. Assessment completed and VSS. Bowel sounds are active in all 4 quadrants. Lung sounds are diminished in bases. Pt schedule for discharge to [facility] at 1300 with [facility] transportation. Bed is in lowest position and call light is within reach. Fall precautions are in place.

## 2024-06-18 NOTE — DISCHARGE SUMMARY
Discharge Summary    CHIEF COMPLAINT ON ADMISSION  Chief Complaint   Patient presents with    Hypotension     Hypoxia when pt removed oxygen (per report)       Reason for Admission  EMS    Admission Date  6/12/2024     CODE STATUS  Full Code    HPI & HOSPITAL COURSE  Kevin Taylor is a 59-year-old male with a PMHx DMT2, GERD with esophagitis, chronic back pain.  Patient presented to the emergency room 6/12 at the request of his [employer] support person.  He states that they called him on the phone and patient did not sound like himself and they requested emergency room visit.       Patient was admitted [facility] 5/27 through 6/11 for cauda equina syndrome.  He is S/p C4-C7 ACDF on 6/4 and L4-L5 laminectomies with stealth fusion 6/7. Patient was discharged from [facility] 6/11 to [facility].  Patient found to have new elevation of LFTs and anemia.  He received 1 unit RBCs in the emergency room.  Patient found to have multifocal pneumonia, treated with IV antibiotics and completed antibiotic course. Patient found to have elevated LFTs on presentation, these improved and now resolved by time of discharge with supportive treatment. RUQ US showed possible gallbaldder inflammation, but MRCP showed normal findings. Patient underwent HIDA which showed gallbladder dyskinesia EF 11%, but no blockage or cholecystitis. Patients symptoms and LFTs improved. His anemia also remained stable, no evidence of GI bleeding. At this time patient is medically cleared to discharge to SNF for ongoing PT/OT services. He is to follow up with neurosurgery for previous surgeries in next two weeks.    Therefore, he is discharged in fair and stable condition to skilled nursing facility.    The patient met 2-midnight criteria for an inpatient stay at the time of discharge.      FOLLOW UP ITEMS POST DISCHARGE  Take medications as prescribed.  Follow up with neurosurgery and PCP.    DISCHARGE DIAGNOSES  Principal Problem:    Acute on  chronic anemia (POA: Yes)  Active Problems:    Uncontrolled type 2 diabetes mellitus with hyperglycemia (HCC) (POA: Yes)    Acute on chronic respiratory failure (HCC) (POA: Yes)    Multifocal pneumonia (POA: Yes)    Cervical stenosis of spinal canal (POA: Yes)    Cauda equina compression (HCC) (POA: Yes)    Transaminitis (POA: Yes)    Elevated liver function tests (POA: Yes)    Hypoglycemia (POA: Yes)  Resolved Problems:    * No resolved hospital problems. *      FOLLOW UP  No future appointments.  [facility]  [address]  [place] [address]  [phone]          MEDICATIONS ON DISCHARGE     Medication List        START taking these medications        Instructions   gabapentin 100 MG Caps  Commonly known as: Neurontin   Take 1 Capsule by mouth 3 times a day.  Dose: 100 mg     tamsulosin 0.4 MG capsule  Commonly known as: Flomax   Take 1 Capsule by mouth every day.  Dose: 0.4 mg            CHANGE how you take these medications        Instructions   insulin GLARGINE 100 UNIT/ML Soln  What changed:   how much to take  when to take this  Commonly known as: Lantus,Semglee   Inject 10 Units under the skin every evening.  Dose: 10 Units     insulin regular 100 Unit/mL Soln  What changed:   how much to take  additional instructions  Commonly known as: HumuLIN R/NovoLIN R   Inject 2-10 Units under the skin 4 Times a Day,Before Meals and at Bedtime. For glucose:  70   - 150  mg/dL =      0 Units  151 - 200  mg/dL =     1 Units  201 - 250  mg/dL =    2 Units  251 - 300  mg/dL  =   3 Units  301 - 350  mg/dL  =   4 Units  351 - 400 mg/dL   =   5 Units  Over 400 mg/dL   =   6 Units  Dose: 2-10 Units     omeprazole 40 MG delayed-release capsule  What changed:   medication strength  how much to take  when to take this  Commonly known as: PriLOSEC   Take 1 Capsule by mouth every day.  Dose: 40 mg     oxyCODONE immediate release 10 MG immediate release tablet  What changed:   medication strength  how much  to take  when to take this  Another medication with the same name was removed. Continue taking this medication, and follow the directions you see here.  Commonly known as: Roxicodone   Take 1 Tablet by mouth every 6 hours as needed for Severe Pain for up to 5 days.  Dose: 10 mg     senna-docusate 8.6-50 MG Tabs  What changed: when to take this  Commonly known as: Pericolace Or Senokot S   Take 1 Tablet by mouth every evening.  Dose: 1 Tablet            CONTINUE taking these medications        Instructions   acetaminophen 325 MG Tabs  Commonly known as: Tylenol   Take 2 Tablets by mouth every four hours as needed for Mild Pain, Moderate Pain or Fever. 2 tablets= 650mg  Dose: 650 mg     aspirin 81 MG EC tablet   Take 81 mg by mouth every evening.  Dose: 81 mg     Cholecalciferol 2000 UNIT Tabs   Take 1 Tablet by mouth every day.  Dose: 2,000 Units     cyanocobalamin 500 MCG Tabs  Commonly known as: Vitamin B-12   Take 1,000 mcg by mouth every day. 2 tablets= 1000mg  Dose: 1,000 mcg     diphenhydrAMINE 25 MG Tabs  Commonly known as: Benadryl   Take 1 Tablet by mouth every 6 hours as needed for Itching.  Dose: 25 mg     docusate sodium 100 MG Caps   Take 100 mg by mouth 2 times a day.  Dose: 100 mg     ipratropium-albuterol 0.5-2.5 (3) MG/3ML nebulizer solution  Commonly known as: Duoneb   Take 3 mL by nebulization every 4 hours.  Dose: 3 mL     lidocaine 4 % Ptch  Commonly known as: Asperflex   Place 1 Patch on the skin every 24 hours.  Dose: 1 Patch     magnesium hydroxide 400 MG/5ML Susp  Commonly known as: Milk Of Magnesia   Take 30 mL by mouth every 72 hours. As needed for constipation  Dose: 30 mL     melatonin 5 mg Tabs   Take 1 Tablet by mouth at bedtime as needed (Sleep).  Dose: 5 mg     methocarbamol 500 MG Tabs  Commonly known as: Robaxin   Take 1 Tablet by mouth every 8 hours as needed (muscle spasm).  Dose: 500 mg     ondansetron 4 MG Tbdp  Commonly known as: Zofran ODT   Dissolve 1 Tablet by mouth every  four hours as needed for Nausea/Vomiting  Dose: 4 mg     venlafaxine XR 75 MG Cp24  Commonly known as: Effexor XR   Take 1 Capsule by mouth every morning with breakfast.  Dose: 75 mg              Allergies  No Known Allergies    DIET  Orders Placed This Encounter   Procedures    Diet Order Diet: Level 5 - Minced and Moist (ALTERNATE bites & sips, CRUSH larger pills-otherwise whole); Liquid level: Level 0 - Thin; Tray Modifications (optional): SLP - 1:1 Supervision by Nursing     Standing Status:   Standing     Number of Occurrences:   1     Order Specific Question:   Diet:     Answer:   Level 5 - Minced and Moist [24]     Comments:   ALTERNATE bites & sips, CRUSH larger pills-otherwise whole     Order Specific Question:   Liquid level     Answer:   Level 0 - Thin     Order Specific Question:   Tray Modifications (optional)     Answer:   SLP - 1:1 Supervision by Nursing       ACTIVITY  As tolerated and directed by skilled nursing.  Weight bearing as tolerated    LINES, DRAINS, AND WOUNDS  This is an automated list. Peripheral IVs will be removed prior to discharge.  Peripheral IV 06/12/24 20 G Right Antecubital (Active)   Site Assessment Clean;Dry;Intact 06/18/24 0800   Dressing Type Transparent 06/18/24 0800   Line Status Infusing;Flushed;Scrubbed the hub prior to access 06/18/24 0800   Dressing Status Clean;Dry;Intact 06/18/24 0800   Dressing Intervention N/A 06/18/24 0800   Dressing Change Due 06/22/24 06/18/24 0800   Date Primary Tubing Changed 06/13/24 06/14/24 0800   Date Secondary Tubing Changed 06/13/24 06/14/24 0800   NEXT Primary Tubing Change  06/22/24 06/18/24 0800   NEXT Secondary Tubing Change  06/14/24 06/14/24 0800   Infiltration Grading ([facility], [facility]) 0 06/18/24 0800   Phlebitis Scale ([facility] Only) 0 06/18/24 0800     Urethral Catheter 16 Fr. (Active)   Site Assessment Clean;Skin intact 06/17/24 2000   Collection Container Standard drainage bag 06/17/24 2000   Urinary Catheter Care Tamper Evident Seal  in Place;Drainage Tube Extended;Drainage Bag Not Overfilled;Drainage Tubing Properly Secured;Drainage Bag Below Bladder Level and Not on Floor 06/17/24 2000   Securement Method Securing device (Describe) 06/17/24 2000   Output (mL) 3700 mL 06/17/24 1700      Moisture Associated Skin Damage 06/06/24 Buttock;Perineum (Active)       Wound 03/11/23 Hand;Finger, Thumb;Finger, 2nd;Finger, 3rd;Finger, 4th;Finger, 5th Left (Active)       Wound 03/11/23 Hand Right (Active)       Wound 03/11/23 Toe, Hallux Left (Active)       Wound 06/12/23 Incision Finger, Thumb Right 4x4, nathaly, coban  (Active)       Wound 01/26/24 Arm Distal;Anterior Left (Active)       Wound 05/06/24 Ankle Ventral Left (Active)       Wound 05/06/24 Ankle Anterior Left (Active)       Wound 06/04/24 Incision Neck Bilateral steri, mastisol, 4x4, tegaderm (Active)   Site Assessment KATHERINE 06/18/24 0800   Periwound Assessment Dry;Clean;Intact 06/18/24 0800   Margins Attached edges 06/17/24 0800   Closure Closure strips 06/18/24 0800   Drainage Amount None 06/18/24 0800   Dressing Status Dry;Intact 06/18/24 0800   Dressing Changed Observed 06/18/24 0800   Dressing Options Dry Gauze;Transparent Film 06/18/24 0800   NEXT Weekly Photo (Inpatient Only) 06/19/24 06/17/24 2000   Adhesive Closure Strips Intact 06/13/24 2000       Wound 06/07/24 Incision Back BACITRACIN, 4X4, TEGADERM (Active)   Wound Image   06/13/24 2000   Site Assessment KATHERINE 06/18/24 0800   Periwound Assessment Clean;Intact;Dry 06/18/24 0800   Margins Attached edges 06/18/24 0800   Closure Staples 06/18/24 0800   Drainage Amount None 06/18/24 0800   Treatments Offloading 06/17/24 2000   Wound Cleansing Foam Cleanser/Washcloth 06/18/24 0800   Periwound Protectant Not Applicable 06/17/24 0800   Dressing Status Dry;Clean;Intact 06/18/24 0800   Dressing Changed Observed 06/18/24 0800   Dressing Options Transparent Film;Dry Gauze;Island Dressing 06/18/24 0800   NEXT Weekly Photo (Inpatient Only) 06/19/24  06/17/24 0800       Peripheral IV 06/12/24 20 G Right Antecubital (Active)   Site Assessment Clean;Dry;Intact 06/18/24 0800   Dressing Type Transparent 06/18/24 0800   Line Status Infusing;Flushed;Scrubbed the hub prior to access 06/18/24 0800   Dressing Status Clean;Dry;Intact 06/18/24 0800   Dressing Intervention N/A 06/18/24 0800   Dressing Change Due 06/22/24 06/18/24 0800   Date Primary Tubing Changed 06/13/24 06/14/24 0800   Date Secondary Tubing Changed 06/13/24 06/14/24 0800   NEXT Primary Tubing Change  06/22/24 06/18/24 0800   NEXT Secondary Tubing Change  06/14/24 06/14/24 0800   Infiltration Grading ([facility]) 0 06/18/24 0800   Phlebitis Scale ([facility] Only) 0 06/18/24 0800           Urethral Catheter 16 Fr. (Active)   Site Assessment Clean;Skin intact 06/17/24 2000   Collection Container Standard drainage bag 06/17/24 2000   Urinary Catheter Care Tamper Evident Seal in Place;Drainage Tube Extended;Drainage Bag Not Overfilled;Drainage Tubing Properly Secured;Drainage Bag Below Bladder Level and Not on Floor 06/17/24 2000   Securement Method Securing device (Describe) 06/17/24 2000   Output (mL) 3700 mL 06/17/24 1700        MENTAL STATUS ON TRANSFER      Alert, oriented x3       CONSULTATIONS  General surgery    PROCEDURES  none    LABORATORY  Lab Results   Component Value Date    SODIUM 138 06/18/2024    POTASSIUM 3.7 06/18/2024    CHLORIDE 95 (L) 06/18/2024    CO2 35 (H) 06/18/2024    GLUCOSE 165 (H) 06/18/2024    BUN 9 06/18/2024    CREATININE 0.50 06/18/2024        Lab Results   Component Value Date    WBC 7.9 06/18/2024    HEMOGLOBIN 9.0 (L) 06/18/2024    HEMATOCRIT 28.9 (L) 06/18/2024    PLATELETCT 329 06/18/2024        Total time of the discharge process exceeds 34 minutes.

## 2024-06-18 NOTE — PROGRESS NOTES
Assumed care of patient at 1900. Received report from day RN. Patient A&Ox4, on 3L NC, Reporting a pain level of 5/10, prn oxy administered per MAR. Crowley catheter in place. Neck brace on, brace on L foot on. Call light within reach, belongings within reach, fall precautions in place, bed in lowest position, bed alarm on. Patient does not have any other needs at this time.     POC was discussed with patient. All questions were answered. Patient verbalized understanding.

## 2024-06-18 NOTE — CARE PLAN
Problem: Hyperinflation  Goal: Prevent or improve atelectasis  Description: Target End Date:  3 to 4 days    1. Instruct incentive spirometry usage  2.  Perform hyperinflation therapy as indicated  Outcome: Progressing   PEP: BID  IS: 3000

## 2024-06-18 NOTE — DISCHARGE PLANNING
Case Management Discharge Planning    Admission Date: 6/12/2024  GMLOS: 4.1  ALOS: 4    6-Clicks ADL Score: 17  6-Clicks Mobility Score: 18  PT and/or OT Eval ordered: Yes  Post-acute Referrals Ordered: Yes  Post-acute Choice Obtained: Yes  Has referral(s) been sent to post-acute provider:  Yes      Anticipated Discharge Dispo: Discharge Disposition: D/T to SNF with Medicare cert in anticipation of skilled care (03)    DME Needed: No    Action(s) Taken: OTHER    Patient's case discussed with MD during Morning Huddle. Per MD, patient is medically cleared. Per DPA, [facility] provided confirmation of clinical acceptance and bed availability. Per PENNIE Cruz, patient appropriate for transportation via [employer].    MYRON entered Request for Transportation Order in EPIC. Per Orin Ride Line Coordinator, [facility] is scheduled for today 6/18/24 at 1300, RN, MD, and patient aware.  MYRON completed [employer] Form and Transfer Packet.     Escalations Completed: None    Medically Clear: Yes    Next Steps: SNF    Barriers to Discharge: None    Is the patient up for discharge tomorrow:

## 2024-06-18 NOTE — DISCHARGE PLANNING
DC Transport Scheduled    Transport Company Scheduled:  [employer]  Spoke with Hola at [facility] to schedule transport.    Scheduled Date: 6/18/2024  Scheduled Time: 1300    Destination: [facility]   Destination address: [address] [place] [address]    Notified care team of scheduled transport via [employer].     If there are any changes needed to the DC transportation scheduled, please contact [facility] at ext. [phone] between the hours of 0420-1588 Mon-Fri. If outside those hours, contact the ED Case Manager at ext. [phone].

## 2024-06-18 NOTE — CARE PLAN
The patient is Stable - Low risk of patient condition declining or worsening    Shift Goals  Clinical Goals: Pt's pain to decrease to 3/10 by end of shift following adminsitration of prn pain medication. Pt to remain free from falls.  Patient Goals: Sleep  Family Goals: KATHERINE    Progress made toward(s) clinical / shift goals:      Pt reported pain rating of 5/10 and was given prn oxy per MAR. Pt reassessed and pain decreased to 3/10. Pt remains free from falls, bed alarm verified to be on, bedside commitment done, hourly rounding performed, pt utilizing call light for needs throughout shift. Pt able to sleep throughout the night with pain control.     Problem: Pain - Standard  Goal: Alleviation of pain or a reduction in pain to the patient’s comfort goal  Outcome: Progressing     Problem: Knowledge Deficit - Standard  Goal: Patient and family/care givers will demonstrate understanding of plan of care, disease process/condition, diagnostic tests and medications  Outcome: Progressing     Problem: Fall Risk  Goal: Patient will remain free from falls  Outcome: Progressing       Patient is not progressing towards the following goals:

## 2024-06-26 DIAGNOSIS — E11.00 TYPE 2 DIABETES MELLITUS WITH HYPEROSMOLAR NONKETOTIC HYPERGLYCEMIA (HCC): ICD-10-CM

## 2024-06-28 ENCOUNTER — TELEPHONE (OUTPATIENT)
Dept: HEALTH INFORMATION MANAGEMENT | Facility: OTHER | Age: 59
End: 2024-06-28
Payer: COMMERCIAL

## 2024-07-21 ENCOUNTER — HOSPITAL ENCOUNTER (EMERGENCY)
Facility: MEDICAL CENTER | Age: 59
DRG: 189 | End: 2024-07-22
Attending: STUDENT IN AN ORGANIZED HEALTH CARE EDUCATION/TRAINING PROGRAM
Payer: COMMERCIAL

## 2024-07-21 ENCOUNTER — APPOINTMENT (OUTPATIENT)
Dept: RADIOLOGY | Facility: MEDICAL CENTER | Age: 59
DRG: 189 | End: 2024-07-21
Attending: STUDENT IN AN ORGANIZED HEALTH CARE EDUCATION/TRAINING PROGRAM
Payer: COMMERCIAL

## 2024-07-21 VITALS
HEIGHT: 72 IN | SYSTOLIC BLOOD PRESSURE: 119 MMHG | RESPIRATION RATE: 19 BRPM | WEIGHT: 175 LBS | HEART RATE: 75 BPM | BODY MASS INDEX: 23.7 KG/M2 | DIASTOLIC BLOOD PRESSURE: 62 MMHG | TEMPERATURE: 97.5 F | OXYGEN SATURATION: 97 %

## 2024-07-21 DIAGNOSIS — S16.1XXA STRAIN OF NECK MUSCLE, INITIAL ENCOUNTER: ICD-10-CM

## 2024-07-21 DIAGNOSIS — S09.90XA CLOSED HEAD INJURY, INITIAL ENCOUNTER: ICD-10-CM

## 2024-07-21 PROCEDURE — 72125 CT NECK SPINE W/O DYE: CPT

## 2024-07-21 PROCEDURE — 700102 HCHG RX REV CODE 250 W/ 637 OVERRIDE(OP): Performed by: STUDENT IN AN ORGANIZED HEALTH CARE EDUCATION/TRAINING PROGRAM

## 2024-07-21 PROCEDURE — 99284 EMERGENCY DEPT VISIT MOD MDM: CPT

## 2024-07-21 PROCEDURE — 70450 CT HEAD/BRAIN W/O DYE: CPT

## 2024-07-21 PROCEDURE — A9270 NON-COVERED ITEM OR SERVICE: HCPCS | Performed by: STUDENT IN AN ORGANIZED HEALTH CARE EDUCATION/TRAINING PROGRAM

## 2024-07-21 RX ORDER — ACETAMINOPHEN 500 MG
1000 TABLET ORAL ONCE
Status: COMPLETED | OUTPATIENT
Start: 2024-07-21 | End: 2024-07-21

## 2024-07-21 RX ORDER — OXYCODONE HYDROCHLORIDE 5 MG/1
5 TABLET ORAL ONCE
Status: COMPLETED | OUTPATIENT
Start: 2024-07-21 | End: 2024-07-21

## 2024-07-21 RX ADMIN — OXYCODONE 5 MG: 5 TABLET ORAL at 22:53

## 2024-07-21 RX ADMIN — ACETAMINOPHEN 1000 MG: 500 TABLET ORAL at 22:53

## 2024-07-21 ASSESSMENT — PAIN DESCRIPTION - PAIN TYPE: TYPE: CHRONIC PAIN

## 2024-07-21 ASSESSMENT — FIBROSIS 4 INDEX: FIB4 SCORE: 0.34

## 2024-07-22 ENCOUNTER — APPOINTMENT (OUTPATIENT)
Dept: RADIOLOGY | Facility: MEDICAL CENTER | Age: 59
DRG: 189 | End: 2024-07-22
Attending: STUDENT IN AN ORGANIZED HEALTH CARE EDUCATION/TRAINING PROGRAM
Payer: COMMERCIAL

## 2024-07-22 ENCOUNTER — HOSPITAL ENCOUNTER (INPATIENT)
Facility: MEDICAL CENTER | Age: 59
LOS: 1 days | DRG: 189 | End: 2024-07-26
Attending: STUDENT IN AN ORGANIZED HEALTH CARE EDUCATION/TRAINING PROGRAM | Admitting: STUDENT IN AN ORGANIZED HEALTH CARE EDUCATION/TRAINING PROGRAM
Payer: COMMERCIAL

## 2024-07-22 DIAGNOSIS — R79.89 ELEVATED BRAIN NATRIURETIC PEPTIDE (BNP) LEVEL: ICD-10-CM

## 2024-07-22 DIAGNOSIS — I95.9 HYPOTENSION, UNSPECIFIED HYPOTENSION TYPE: ICD-10-CM

## 2024-07-22 DIAGNOSIS — R79.89 ELEVATED TROPONIN: ICD-10-CM

## 2024-07-22 DIAGNOSIS — E11.65 UNCONTROLLED TYPE 2 DIABETES MELLITUS WITH HYPERGLYCEMIA (HCC): ICD-10-CM

## 2024-07-22 DIAGNOSIS — S91.301A OPEN WOUND OF RIGHT FOOT, INITIAL ENCOUNTER: ICD-10-CM

## 2024-07-22 DIAGNOSIS — R53.1 GENERALIZED WEAKNESS: ICD-10-CM

## 2024-07-22 DIAGNOSIS — J96.01 ACUTE HYPOXIC RESPIRATORY FAILURE (HCC): ICD-10-CM

## 2024-07-22 LAB
ALBUMIN SERPL BCP-MCNC: 3.5 G/DL (ref 3.2–4.9)
ALBUMIN/GLOB SERPL: 1.1 G/DL
ALP SERPL-CCNC: 115 U/L (ref 30–99)
ALT SERPL-CCNC: 8 U/L (ref 2–50)
ANION GAP SERPL CALC-SCNC: 16 MMOL/L (ref 7–16)
APPEARANCE UR: CLEAR
AST SERPL-CCNC: 9 U/L (ref 12–45)
BACTERIA #/AREA URNS HPF: NEGATIVE /HPF
BASOPHILS # BLD AUTO: 0.1 % (ref 0–1.8)
BASOPHILS # BLD: 0.01 K/UL (ref 0–0.12)
BILIRUB SERPL-MCNC: 0.3 MG/DL (ref 0.1–1.5)
BILIRUB UR QL STRIP.AUTO: NEGATIVE
BUN SERPL-MCNC: 19 MG/DL (ref 8–22)
CALCIUM ALBUM COR SERPL-MCNC: 9.3 MG/DL (ref 8.5–10.5)
CALCIUM SERPL-MCNC: 8.9 MG/DL (ref 8.5–10.5)
CHLORIDE SERPL-SCNC: 90 MMOL/L (ref 96–112)
CO2 SERPL-SCNC: 29 MMOL/L (ref 20–33)
COLOR UR: YELLOW
CREAT SERPL-MCNC: 0.87 MG/DL (ref 0.5–1.4)
D DIMER PPP IA.FEU-MCNC: 1.05 UG/ML (FEU) (ref 0–0.5)
EKG IMPRESSION: NORMAL
EOSINOPHIL # BLD AUTO: 0.09 K/UL (ref 0–0.51)
EOSINOPHIL NFR BLD: 1.1 % (ref 0–6.9)
EPI CELLS #/AREA URNS HPF: NEGATIVE /HPF
ERYTHROCYTE [DISTWIDTH] IN BLOOD BY AUTOMATED COUNT: 38.5 FL (ref 35.9–50)
GFR SERPLBLD CREATININE-BSD FMLA CKD-EPI: 99 ML/MIN/1.73 M 2
GLOBULIN SER CALC-MCNC: 3.1 G/DL (ref 1.9–3.5)
GLUCOSE SERPL-MCNC: 433 MG/DL (ref 65–99)
GLUCOSE UR STRIP.AUTO-MCNC: >=1000 MG/DL
HCT VFR BLD AUTO: 34.8 % (ref 42–52)
HGB BLD-MCNC: 11.6 G/DL (ref 14–18)
HYALINE CASTS #/AREA URNS LPF: ABNORMAL /LPF
IMM GRANULOCYTES # BLD AUTO: 0.04 K/UL (ref 0–0.11)
IMM GRANULOCYTES NFR BLD AUTO: 0.5 % (ref 0–0.9)
KETONES UR STRIP.AUTO-MCNC: 40 MG/DL
LACTATE SERPL-SCNC: 1.5 MMOL/L (ref 0.5–2)
LEUKOCYTE ESTERASE UR QL STRIP.AUTO: NEGATIVE
LYMPHOCYTES # BLD AUTO: 1.36 K/UL (ref 1–4.8)
LYMPHOCYTES NFR BLD: 17.4 % (ref 22–41)
MCH RBC QN AUTO: 27.4 PG (ref 27–33)
MCHC RBC AUTO-ENTMCNC: 33.3 G/DL (ref 32.3–36.5)
MCV RBC AUTO: 82.3 FL (ref 81.4–97.8)
MICRO URNS: ABNORMAL
MONOCYTES # BLD AUTO: 0.5 K/UL (ref 0–0.85)
MONOCYTES NFR BLD AUTO: 6.4 % (ref 0–13.4)
NEUTROPHILS # BLD AUTO: 5.83 K/UL (ref 1.82–7.42)
NEUTROPHILS NFR BLD: 74.5 % (ref 44–72)
NITRITE UR QL STRIP.AUTO: NEGATIVE
NRBC # BLD AUTO: 0 K/UL
NRBC BLD-RTO: 0 /100 WBC (ref 0–0.2)
NT-PROBNP SERPL IA-MCNC: 1704 PG/ML (ref 0–125)
PH UR STRIP.AUTO: 6.5 [PH] (ref 5–8)
PLATELET # BLD AUTO: 279 K/UL (ref 164–446)
PMV BLD AUTO: 9.6 FL (ref 9–12.9)
POTASSIUM SERPL-SCNC: 4.1 MMOL/L (ref 3.6–5.5)
PROT SERPL-MCNC: 6.6 G/DL (ref 6–8.2)
PROT UR QL STRIP: 30 MG/DL
RBC # BLD AUTO: 4.23 M/UL (ref 4.7–6.1)
RBC # URNS HPF: ABNORMAL /HPF
RBC UR QL AUTO: NEGATIVE
SODIUM SERPL-SCNC: 135 MMOL/L (ref 135–145)
SP GR UR STRIP.AUTO: 1.02
TROPONIN T SERPL-MCNC: 36 NG/L (ref 6–19)
TROPONIN T SERPL-MCNC: 52 NG/L (ref 6–19)
UROBILINOGEN UR STRIP.AUTO-MCNC: 0.2 MG/DL
WBC # BLD AUTO: 7.8 K/UL (ref 4.8–10.8)
WBC #/AREA URNS HPF: ABNORMAL /HPF

## 2024-07-22 PROCEDURE — 83605 ASSAY OF LACTIC ACID: CPT

## 2024-07-22 PROCEDURE — 84484 ASSAY OF TROPONIN QUANT: CPT | Mod: 91

## 2024-07-22 PROCEDURE — 99285 EMERGENCY DEPT VISIT HI MDM: CPT

## 2024-07-22 PROCEDURE — 83880 ASSAY OF NATRIURETIC PEPTIDE: CPT

## 2024-07-22 PROCEDURE — 81001 URINALYSIS AUTO W/SCOPE: CPT

## 2024-07-22 PROCEDURE — 87040 BLOOD CULTURE FOR BACTERIA: CPT

## 2024-07-22 PROCEDURE — 770020 HCHG ROOM/CARE - TELE (206)

## 2024-07-22 PROCEDURE — 71045 X-RAY EXAM CHEST 1 VIEW: CPT

## 2024-07-22 PROCEDURE — 99222 1ST HOSP IP/OBS MODERATE 55: CPT | Performed by: STUDENT IN AN ORGANIZED HEALTH CARE EDUCATION/TRAINING PROGRAM

## 2024-07-22 PROCEDURE — 80053 COMPREHEN METABOLIC PANEL: CPT

## 2024-07-22 PROCEDURE — 93005 ELECTROCARDIOGRAM TRACING: CPT | Performed by: STUDENT IN AN ORGANIZED HEALTH CARE EDUCATION/TRAINING PROGRAM

## 2024-07-22 PROCEDURE — 700117 HCHG RX CONTRAST REV CODE 255: Performed by: STUDENT IN AN ORGANIZED HEALTH CARE EDUCATION/TRAINING PROGRAM

## 2024-07-22 PROCEDURE — 36415 COLL VENOUS BLD VENIPUNCTURE: CPT

## 2024-07-22 PROCEDURE — 85379 FIBRIN DEGRADATION QUANT: CPT

## 2024-07-22 PROCEDURE — 71275 CT ANGIOGRAPHY CHEST: CPT

## 2024-07-22 PROCEDURE — 700105 HCHG RX REV CODE 258: Performed by: STUDENT IN AN ORGANIZED HEALTH CARE EDUCATION/TRAINING PROGRAM

## 2024-07-22 PROCEDURE — 87086 URINE CULTURE/COLONY COUNT: CPT

## 2024-07-22 PROCEDURE — 85025 COMPLETE CBC W/AUTO DIFF WBC: CPT

## 2024-07-22 RX ORDER — PROMETHAZINE HYDROCHLORIDE 25 MG/1
12.5-25 SUPPOSITORY RECTAL EVERY 4 HOURS PRN
Status: DISCONTINUED | OUTPATIENT
Start: 2024-07-22 | End: 2024-07-26 | Stop reason: HOSPADM

## 2024-07-22 RX ORDER — ENOXAPARIN SODIUM 100 MG/ML
40 INJECTION SUBCUTANEOUS DAILY
Status: DISCONTINUED | OUTPATIENT
Start: 2024-07-23 | End: 2024-07-26 | Stop reason: HOSPADM

## 2024-07-22 RX ORDER — SODIUM CHLORIDE 9 MG/ML
1000 INJECTION, SOLUTION INTRAVENOUS ONCE
Status: COMPLETED | OUTPATIENT
Start: 2024-07-22 | End: 2024-07-22

## 2024-07-22 RX ORDER — PROCHLORPERAZINE EDISYLATE 5 MG/ML
5-10 INJECTION INTRAMUSCULAR; INTRAVENOUS EVERY 4 HOURS PRN
Status: DISCONTINUED | OUTPATIENT
Start: 2024-07-22 | End: 2024-07-26 | Stop reason: HOSPADM

## 2024-07-22 RX ORDER — LABETALOL HYDROCHLORIDE 5 MG/ML
10 INJECTION, SOLUTION INTRAVENOUS EVERY 4 HOURS PRN
Status: DISCONTINUED | OUTPATIENT
Start: 2024-07-22 | End: 2024-07-26 | Stop reason: HOSPADM

## 2024-07-22 RX ORDER — OXYCODONE HYDROCHLORIDE 10 MG/1
10 TABLET ORAL 2 TIMES DAILY PRN
Status: ON HOLD | COMMUNITY
End: 2024-07-26

## 2024-07-22 RX ORDER — ONDANSETRON 2 MG/ML
4 INJECTION INTRAMUSCULAR; INTRAVENOUS EVERY 4 HOURS PRN
Status: DISCONTINUED | OUTPATIENT
Start: 2024-07-22 | End: 2024-07-26 | Stop reason: HOSPADM

## 2024-07-22 RX ORDER — ONDANSETRON 4 MG/1
4 TABLET, ORALLY DISINTEGRATING ORAL EVERY 4 HOURS PRN
Status: DISCONTINUED | OUTPATIENT
Start: 2024-07-22 | End: 2024-07-26 | Stop reason: HOSPADM

## 2024-07-22 RX ORDER — ASPIRIN 81 MG/1
81 TABLET ORAL EVERY EVENING
Status: DISCONTINUED | OUTPATIENT
Start: 2024-07-23 | End: 2024-07-26 | Stop reason: HOSPADM

## 2024-07-22 RX ORDER — ACETAMINOPHEN 325 MG/1
650 TABLET ORAL EVERY 6 HOURS PRN
Status: DISCONTINUED | OUTPATIENT
Start: 2024-07-22 | End: 2024-07-26 | Stop reason: HOSPADM

## 2024-07-22 RX ORDER — PROMETHAZINE HYDROCHLORIDE 25 MG/1
12.5-25 TABLET ORAL EVERY 4 HOURS PRN
Status: DISCONTINUED | OUTPATIENT
Start: 2024-07-22 | End: 2024-07-26 | Stop reason: HOSPADM

## 2024-07-22 RX ADMIN — IOHEXOL 50 ML: 350 INJECTION, SOLUTION INTRAVENOUS at 21:00

## 2024-07-22 RX ADMIN — SODIUM CHLORIDE 1000 ML: 9 INJECTION, SOLUTION INTRAVENOUS at 18:40

## 2024-07-22 ASSESSMENT — ENCOUNTER SYMPTOMS
COUGH: 0
FOCAL WEAKNESS: 0
PND: 0
SHORTNESS OF BREATH: 0
PALPITATIONS: 0
NECK PAIN: 0
VOMITING: 0
DOUBLE VISION: 0
MYALGIAS: 0
CHILLS: 0
FALLS: 1
ORTHOPNEA: 0
DIZZINESS: 0
DEPRESSION: 0
HEADACHES: 0
WEAKNESS: 1
FEVER: 0
SORE THROAT: 0
HEARTBURN: 0
SPUTUM PRODUCTION: 0
BLURRED VISION: 0
NAUSEA: 0
ABDOMINAL PAIN: 0

## 2024-07-22 ASSESSMENT — FIBROSIS 4 INDEX: FIB4 SCORE: 0.34

## 2024-07-22 NOTE — ED NOTES
Pt discharged, all appropriate hospital equipment removed (IV, monitor, pulse ox, etc.). Pt left unit via wheelchair with [facility] for transport home. Personal belongings with pt when leaving unit. Pt given discharge instructions prior to leaving unit including where to  prescriptions and when to follow-up; verbalizes understanding. Pt informed to return to ED if symptoms worsen/return or altered status develop. Copy of discharge instructions signed and turned into DC basket and copy sent with pt.

## 2024-07-22 NOTE — ED TRIAGE NOTES
Chief Complaint   Patient presents with    GLF     BIB EMS from home. Pt reports tripping and falling onto floor. +LOC, +headstrike, -thinners, +neck pain  +    Aox4, GCS 15.     Pt was able to move onto ER gurney from EMS gurney. Pt reports left sided neck pain following fall. Pt *did not* arrive in spinal precautions, pt placed in c-collar on arrival.     Hx: C4 spinal fusion and L4-L5 fusion in June 2024, DM, chronic pain    No interventions by EMS pta.      FSBG 448 by EMS

## 2024-07-22 NOTE — ED NOTES
Transport services present to assist patient home. All belongings with patient for transport home. Patient AOX4 and aware of the POC.

## 2024-07-22 NOTE — DISCHARGE PLANNING
Received call that pt needs a ride home with [facility] for Oxygen & W/C needs.  Called [facility], spoke with Fredrick, pt scheduled to be picked up by 24:30.  Confirmation number [id number].

## 2024-07-22 NOTE — ED PROVIDER NOTES
CHIEF COMPLAINT  Chief Complaint   Patient presents with    GLF     BIB EMS from home. Pt reports tripping and falling onto floor. +LOC, +headstrike, -thinners, +neck pain  +       LIMITATION TO HISTORY   Select:     RAY Taylor is a 59 y.o. male who presents to the Emergency Department by EMS from home after patient tripped while getting into his wheelchair causing him to fall to the ground he reports he had a positive loss of consciousness is unknown how long he reports hitting his head denies neck pain denies pain elsewhere    OUTSIDE HISTORIAN(S):  Select:    EXTERNAL RECORDS REVIEWED  Select:       PAST MEDICAL HISTORY  Past Medical History:   Diagnosis Date    Abnormal electrocardiogram (ECG) (EKG) 11/8/2021    KRISTAL (acute kidney injury) (HCC) 11/8/2021    Chest pain in adult 11/8/2021    CKD (chronic kidney disease) stage 3, GFR 30-59 ml/min 11/7/2021    Diabetes ([facility])     Diabetic ketoacidosis without coma associated with type 2 diabetes mellitus ([facility]) 11/7/2021    Diarrhea 3/18/2022    DKA, type 1, not at goal ([facility]) 7/28/2022    Esophagitis 7/28/2022    [place] (hard of hearing)     Very [place] No hearing aides    Hypercholesteremia      .    SURGICAL HISTORY  Past Surgical History:   Procedure Laterality Date    FUSION, SPINE, LUMBAR, PLIF N/A 6/7/2024    Procedure: FUSION, SPINE, LUMBAR, PLIF- REDO LUMBAR 4-5 LAMINECTOMY WITH L4-5 STEALTH FUSION;  Surgeon: Moo Pulido III, M.D.;  Location: SURGERY [facility];  Service: Neurosurgery    LUMBAR LAMINECTOMY DISKECTOMY N/A 6/7/2024    Procedure: LAMINECTOMY, SPINE, LUMBAR, WITH DISCECTOMY;  Surgeon: Moo Pulido III, M.D.;  Location: SURGERY [facility];  Service: Neurosurgery    CERVICAL DISK AND FUSION ANTERIOR N/A 6/4/2024    Procedure: C4-C7 ANTERIOR CERVICAL DISC AND FUSION;  Surgeon: Moo Pulido III, M.D.;  Location: SURGERY [facility];  Service: Neurosurgery    FINGER OR HAND INCISION AND DRAINAGE Right 6/12/2023    Procedure: INCISION  AND DRAINAGE, HAND - THUMB;  Surgeon: Ace Shaw M.D.;  Location: SURGERY [facility];  Service: Orthopedics    AR UPPER GI ENDOSCOPY,DIAGNOSIS N/A 3/14/2023    Procedure: GASTROSCOPY;  Surgeon: Atilio Freed M.D.;  Location: SURGERY [facility];  Service: Gastroenterology    OTHER      appy, lumbar fusion         FAMILY HISTORY  Family History   Problem Relation Age of Onset    Heart Disease Father           SOCIAL HISTORY  Social History     Socioeconomic History    Marital status: Single     Spouse name: Not on file    Number of children: Not on file    Years of education: Not on file    Highest education level: Not on file   Occupational History    Not on file   Tobacco Use    Smoking status: Former    Smokeless tobacco: Never   Vaping Use    Vaping status: Not on file   Substance and Sexual Activity    Alcohol use: Not Currently    Drug use: Not Currently    Sexual activity: Not on file   Other Topics Concern    Not on file   Social History Narrative    Not on file     Social Determinants of Health     Financial Resource Strain: Medium Risk (1/20/2024)    Received from [facility] [employer]    Overall Financial Resource Strain (CARDIA)     Difficulty of Paying Living Expenses: Somewhat hard   Food Insecurity: Food Insecurity Present (6/12/2024)    Hunger Vital Sign     Worried About Running Out of Food in the Last Year: Sometimes true     Ran Out of Food in the Last Year: Sometimes true   Transportation Needs: Unmet Transportation Needs (6/12/2024)    PRAPARE - Transportation     Lack of Transportation (Medical): Yes     Lack of Transportation (Non-Medical): Yes   Physical Activity: Inactive (1/20/2024)    Received from [facility] [employer]    Exercise Vital Sign     Days of Exercise per Week: 0 days     Minutes of Exercise per Session: 0 min   Stress: Stress Concern Present (1/20/2024)    Received from [facility] [employer]    [employer] [place] [employer] - Occupational Stress Questionnaire      Feeling of Stress : Rather much   Social Connections: Socially Isolated (1/20/2024)    Received from [facility]    Social Connection and Isolation Panel [NHANES]     Frequency of Communication with Friends and Family: Once a week     Frequency of Social Gatherings with Friends and Family: Never     Attends [employer] Services: Never     Active Member of Clubs or Organizations: No     Attends Club or Organization Meetings: Never     Marital Status:    Intimate Partner Violence: Not At Risk (6/12/2024)    Humiliation, Afraid, Rape, and Kick questionnaire     Fear of Current or Ex-Partner: No     Emotionally Abused: No     Physically Abused: No     Sexually Abused: No   Housing Stability: Low Risk  (6/12/2024)    Housing Stability Vital Sign     Unable to Pay for Housing in the Last Year: No     Number of Places Lived in the Last Year: 1     Unstable Housing in the Last Year: No         CURRENT MEDICATIONS  No current facility-administered medications on file prior to encounter.     Current Outpatient Medications on File Prior to Encounter   Medication Sig Dispense Refill    insulin GLARGINE (LANTUS,SEMGLEE) 100 UNIT/ML Solution Inject 10 Units under the skin every evening. 10 mL 0    gabapentin (NEURONTIN) 100 MG Cap Take 1 Capsule by mouth 3 times a day. 90 Capsule 0    omeprazole (PRILOSEC) 40 MG delayed-release capsule Take 1 Capsule by mouth every day. 30 Capsule 0    acetaminophen (TYLENOL) 325 MG Tab Take 2 Tablets by mouth every four hours as needed for Mild Pain, Moderate Pain or Fever. 2 tablets= 650mg 30 Tablet 0    insulin regular (HUMULIN R/NOVOLIN R) 100 Unit/mL Solution Inject 2-10 Units under the skin 4 Times a Day,Before Meals and at Bedtime. For glucose:  70   - 150  mg/dL =      0 Units  151 - 200  mg/dL =     1 Units  201 - 250  mg/dL =    2 Units  251 - 300  mg/dL  =   3 Units  301 - 350  mg/dL  =   4 Units  351 - 400 mg/dL   =   5 Units  Over 400 mg/dL   =   6 Units 1 mL 0     magnesium hydroxide (MILK OF MAGNESIA) 400 MG/5ML Suspension Take 30 mL by mouth every 72 hours. As needed for constipation 1 mL 0    lidocaine (ASPERFLEX) 4 % Patch Place 1 Patch on the skin every 24 hours. 1 Patch 0    tamsulosin (FLOMAX) 0.4 MG capsule Take 1 Capsule by mouth every day. 30 Capsule 0    cyanocobalamin (VITAMIN B-12) 500 MCG Tab Take 1,000 mcg by mouth every day. 2 tablets= 1000mg      ipratropium-albuterol (DUONEB) 0.5-2.5 (3) MG/3ML nebulizer solution Take 3 mL by nebulization every 4 hours.      diphenhydrAMINE (BENADRYL) 25 MG Tab Take 1 Tablet by mouth every 6 hours as needed for Itching. 30 Tablet 0    docusate sodium 100 MG Cap Take 100 mg by mouth 2 times a day.      melatonin 5 mg Tab Take 1 Tablet by mouth at bedtime as needed (Sleep).      methocarbamol (ROBAXIN) 500 MG Tab Take 1 Tablet by mouth every 8 hours as needed (muscle spasm).      ondansetron (ZOFRAN ODT) 4 MG TABLET DISPERSIBLE Dissolve 1 Tablet by mouth every four hours as needed for Nausea/Vomiting 10 Tablet 0    senna-docusate (PERICOLACE OR SENOKOT S) 8.6-50 MG Tab Take 1 Tablet by mouth every evening. (Patient taking differently: Take 1 Tablet by mouth at bedtime.) 30 Tablet 0    venlafaxine XR (EFFEXOR XR) 75 MG CAPSULE SR 24 HR Take 1 Capsule by mouth every morning with breakfast. 30 Capsule 3    vitamin D3 2000 UNIT Tab Take 1 Tablet by mouth every day.      aspirin 81 MG EC tablet Take 81 mg by mouth every evening.             ALLERGIES  Allergies   Allergen Reactions    Ketamine      aggressive       PHYSICAL EXAM  VITAL SIGNS:/62   Pulse 75   Temp 36.4 °C (97.5 °F) (Temporal)   Resp 19   Ht 1.829 m (6')   Wt 79.4 kg (175 lb)   SpO2 97%   BMI 23.73 kg/m²       GENERAL: Awake and alert  HEAD: Normocephalic and atraumatic  NECK: Cervical collar in place, C4 tenderness  EYES: Pupils Equal, Round, Reactive to Light, extraocular movements intact, conjunctiva white  ENT: Mucous membranes moist, oropharynx  clear  PULMONARY: Normal effort, clear to auscultation  CARDIOVASCULAR: No murmurs, clicks or rubs, peripheral pulses 2+  ABDOMINAL: Soft, non-tender, no guarding or rigidity present, no pulsatile masses  BACK: no midline tenderness, no costovertebral tenderness  NEUROLOGICAL: Grossly non-focal neurological examination, speech normal, gait normal  EXTREMITIES: No edema, normal to inspection  SKIN: Warm and dry.  PSYCHIATRIC: Affect is appropriate    COURSE & MEDICAL DECISION MAKING    ED COURSE:        INTERVENTIONS BY ME:  Medications   oxyCODONE immediate-release (Roxicodone) tablet 5 mg (5 mg Oral Given 7/21/24 2253)   acetaminophen (Tylenol) tablet 1,000 mg (1,000 mg Oral Given 7/21/24 2253)       Response on recheck:  Cervical collar removed patient comfortable well-appearing    INITIAL ASSESSMENT, COURSE AND PLAN  Care Narrative:   Patient presenting after mechanical ground-level fall no evidence of medical etiology to explain patient's generalized weakness shortness of breath patient with normal vital signs reassuring history of present illness given his neck pain and trauma CT scan obtained of the head and neck without significant traumatic findings head to toe trauma evaluation otherwise without actionable findings.         ADDITIONAL PROBLEM LIST    DISPOSITION AND DISCUSSIONS  Discussion of management with other [facility] or appropriate source(s): None       FINAL DIAGNOSIS  1. Closed head injury, initial encounter    2. Strain of neck muscle, initial encounter             Electronically signed by: Sukhwinder Whitley DO ,12:39 AM 07/22/24

## 2024-07-23 ENCOUNTER — APPOINTMENT (OUTPATIENT)
Dept: CARDIOLOGY | Facility: MEDICAL CENTER | Age: 59
DRG: 189 | End: 2024-07-23
Attending: STUDENT IN AN ORGANIZED HEALTH CARE EDUCATION/TRAINING PROGRAM
Payer: COMMERCIAL

## 2024-07-23 LAB
GLUCOSE BLD STRIP.AUTO-MCNC: 468 MG/DL (ref 65–99)
LV EJECT FRACT  99904: 61
LV EJECT FRACT MOD 2C 99903: 58.46
LV EJECT FRACT MOD 4C 99902: 64.21
LV EJECT FRACT MOD BP 99901: 61.13

## 2024-07-23 PROCEDURE — G0378 HOSPITAL OBSERVATION PER HR: HCPCS

## 2024-07-23 PROCEDURE — 700111 HCHG RX REV CODE 636 W/ 250 OVERRIDE (IP): Mod: JZ | Performed by: INTERNAL MEDICINE

## 2024-07-23 PROCEDURE — 96372 THER/PROPH/DIAG INJ SC/IM: CPT

## 2024-07-23 PROCEDURE — A9270 NON-COVERED ITEM OR SERVICE: HCPCS | Performed by: STUDENT IN AN ORGANIZED HEALTH CARE EDUCATION/TRAINING PROGRAM

## 2024-07-23 PROCEDURE — 99233 SBSQ HOSP IP/OBS HIGH 50: CPT | Performed by: INTERNAL MEDICINE

## 2024-07-23 PROCEDURE — 93306 TTE W/DOPPLER COMPLETE: CPT | Mod: 26 | Performed by: INTERNAL MEDICINE

## 2024-07-23 PROCEDURE — 93306 TTE W/DOPPLER COMPLETE: CPT

## 2024-07-23 PROCEDURE — 82962 GLUCOSE BLOOD TEST: CPT

## 2024-07-23 PROCEDURE — 700111 HCHG RX REV CODE 636 W/ 250 OVERRIDE (IP): Mod: JZ | Performed by: STUDENT IN AN ORGANIZED HEALTH CARE EDUCATION/TRAINING PROGRAM

## 2024-07-23 PROCEDURE — 700102 HCHG RX REV CODE 250 W/ 637 OVERRIDE(OP): Performed by: STUDENT IN AN ORGANIZED HEALTH CARE EDUCATION/TRAINING PROGRAM

## 2024-07-23 RX ORDER — OXYCODONE HYDROCHLORIDE 5 MG/1
5 TABLET ORAL
Status: DISCONTINUED | OUTPATIENT
Start: 2024-07-23 | End: 2024-07-26 | Stop reason: HOSPADM

## 2024-07-23 RX ORDER — OXYCODONE HYDROCHLORIDE 10 MG/1
10 TABLET ORAL
Status: DISCONTINUED | OUTPATIENT
Start: 2024-07-23 | End: 2024-07-26 | Stop reason: HOSPADM

## 2024-07-23 RX ORDER — FUROSEMIDE 10 MG/ML
40 INJECTION INTRAMUSCULAR; INTRAVENOUS
Status: DISCONTINUED | OUTPATIENT
Start: 2024-07-23 | End: 2024-07-26

## 2024-07-23 RX ORDER — HYDROMORPHONE HYDROCHLORIDE 1 MG/ML
0.5 INJECTION, SOLUTION INTRAMUSCULAR; INTRAVENOUS; SUBCUTANEOUS
Status: DISCONTINUED | OUTPATIENT
Start: 2024-07-23 | End: 2024-07-26 | Stop reason: HOSPADM

## 2024-07-23 RX ADMIN — ENOXAPARIN SODIUM 40 MG: 100 INJECTION SUBCUTANEOUS at 17:28

## 2024-07-23 RX ADMIN — FUROSEMIDE 40 MG: 10 INJECTION INTRAMUSCULAR; INTRAVENOUS at 10:11

## 2024-07-23 RX ADMIN — INSULIN GLARGINE-YFGN 10 UNITS: 100 INJECTION, SOLUTION SUBCUTANEOUS at 17:32

## 2024-07-23 RX ADMIN — OXYCODONE 5 MG: 5 TABLET ORAL at 01:07

## 2024-07-23 RX ADMIN — ASPIRIN 81 MG: 81 TABLET, COATED ORAL at 17:28

## 2024-07-23 RX ADMIN — OXYCODONE 5 MG: 5 TABLET ORAL at 17:27

## 2024-07-23 RX ADMIN — OXYCODONE 5 MG: 5 TABLET ORAL at 12:12

## 2024-07-23 SDOH — ECONOMIC STABILITY: TRANSPORTATION INSECURITY
IN THE PAST 12 MONTHS, HAS THE LACK OF TRANSPORTATION KEPT YOU FROM MEDICAL APPOINTMENTS OR FROM GETTING MEDICATIONS?: NO

## 2024-07-23 SDOH — ECONOMIC STABILITY: TRANSPORTATION INSECURITY
IN THE PAST 12 MONTHS, HAS LACK OF RELIABLE TRANSPORTATION KEPT YOU FROM MEDICAL APPOINTMENTS, MEETINGS, WORK OR FROM GETTING THINGS NEEDED FOR DAILY LIVING?: NO

## 2024-07-23 ASSESSMENT — LIFESTYLE VARIABLES
TOTAL SCORE: 0
HAVE PEOPLE ANNOYED YOU BY CRITICIZING YOUR DRINKING: NO
TOTAL SCORE: 0
ON A TYPICAL DAY WHEN YOU DRINK ALCOHOL HOW MANY DRINKS DO YOU HAVE: 0
ALCOHOL_USE: NO
DOES PATIENT WANT TO STOP DRINKING: NO
CONSUMPTION TOTAL: NEGATIVE
EVER HAD A DRINK FIRST THING IN THE MORNING TO STEADY YOUR NERVES TO GET RID OF A HANGOVER: NO
TOTAL SCORE: 0
HAVE YOU EVER FELT YOU SHOULD CUT DOWN ON YOUR DRINKING: NO
HOW MANY TIMES IN THE PAST YEAR HAVE YOU HAD 5 OR MORE DRINKS IN A DAY: 0
SUBSTANCE_ABUSE: 0
AVERAGE NUMBER OF DAYS PER WEEK YOU HAVE A DRINK CONTAINING ALCOHOL: 0
EVER FELT BAD OR GUILTY ABOUT YOUR DRINKING: NO

## 2024-07-23 ASSESSMENT — PAIN DESCRIPTION - PAIN TYPE: TYPE: CHRONIC PAIN

## 2024-07-23 ASSESSMENT — ENCOUNTER SYMPTOMS
PALPITATIONS: 0
COUGH: 0
VOMITING: 0
FEVER: 0
FALLS: 0
SHORTNESS OF BREATH: 0
BLURRED VISION: 0
SORE THROAT: 0
ABDOMINAL PAIN: 0
HEADACHES: 0
NAUSEA: 0
BACK PAIN: 0
HALLUCINATIONS: 0
DOUBLE VISION: 0
CHILLS: 0
SEIZURES: 0

## 2024-07-23 ASSESSMENT — COGNITIVE AND FUNCTIONAL STATUS - GENERAL
TOILETING: A LITTLE
WALKING IN HOSPITAL ROOM: A LITTLE
SUGGESTED CMS G CODE MODIFIER DAILY ACTIVITY: CJ
CLIMB 3 TO 5 STEPS WITH RAILING: A LITTLE
MOVING TO AND FROM BED TO CHAIR: A LITTLE
DRESSING REGULAR UPPER BODY CLOTHING: A LITTLE
MOBILITY SCORE: 20
DRESSING REGULAR LOWER BODY CLOTHING: A LITTLE
STANDING UP FROM CHAIR USING ARMS: A LITTLE
HELP NEEDED FOR BATHING: A LITTLE
SUGGESTED CMS G CODE MODIFIER MOBILITY: CJ
DAILY ACTIVITIY SCORE: 20

## 2024-07-23 ASSESSMENT — SOCIAL DETERMINANTS OF HEALTH (SDOH)
WITHIN THE LAST YEAR, HAVE TO BEEN RAPED OR FORCED TO HAVE ANY KIND OF SEXUAL ACTIVITY BY YOUR PARTNER OR EX-PARTNER?: NO
WITHIN THE LAST YEAR, HAVE YOU BEEN AFRAID OF YOUR PARTNER OR EX-PARTNER?: NO
WITHIN THE LAST YEAR, HAVE YOU BEEN HUMILIATED OR EMOTIONALLY ABUSED IN OTHER WAYS BY YOUR PARTNER OR EX-PARTNER?: NO
IN THE PAST 12 MONTHS, HAS THE ELECTRIC, GAS, OIL, OR WATER COMPANY THREATENED TO SHUT OFF SERVICE IN YOUR HOME?: NO
WITHIN THE PAST 12 MONTHS, YOU WORRIED THAT YOUR FOOD WOULD RUN OUT BEFORE YOU GOT THE MONEY TO BUY MORE: NEVER TRUE
WITHIN THE LAST YEAR, HAVE YOU BEEN KICKED, HIT, SLAPPED, OR OTHERWISE PHYSICALLY HURT BY YOUR PARTNER OR EX-PARTNER?: NO
WITHIN THE PAST 12 MONTHS, THE FOOD YOU BOUGHT JUST DIDN'T LAST AND YOU DIDN'T HAVE MONEY TO GET MORE: NEVER TRUE

## 2024-07-23 ASSESSMENT — FIBROSIS 4 INDEX: FIB4 SCORE: 0.68

## 2024-07-23 NOTE — PROGRESS NOTES
[facility] Medicine Daily Progress Note    Date of Service  7/23/2024    Chief Complaint  Christoph Taylor is a 59 y.o. male admitted 7/22/2024 with shortness of breath    Hospital Course  No notes on file    Interval Problem Update  Patient was seen and examined at bedside.  No acute events overnight. Patient is resting comfortably in bed and in no acute distress.     IV lasix  Echo pending    I have discussed this patient's plan of care and discharge plan at IDT rounds today with Case Management, Nursing, Nursing leadership, and other members of the IDT team.    Code Status  Full Code    Disposition  The patient is not medically cleared for discharge to home or a post-acute facility.      I have placed the appropriate orders for post-discharge needs.    Review of Systems  Review of Systems   Constitutional:  Negative for chills and fever.   HENT:  Negative for congestion and sore throat.    Eyes:  Negative for blurred vision and double vision.   Respiratory:  Negative for cough and shortness of breath.    Cardiovascular:  Negative for chest pain and palpitations.   Gastrointestinal:  Negative for abdominal pain, nausea and vomiting.   Genitourinary:  Negative for dysuria and frequency.   Musculoskeletal:  Negative for back pain and falls.   Skin:  Negative for rash.   Neurological:  Negative for seizures and headaches.   Psychiatric/Behavioral:  Negative for hallucinations and substance abuse.         Physical Exam  Temp:  [36.6 °C (97.9 °F)-36.7 °C (98 °F)] 36.6 °C (97.9 °F)  Pulse:  [78-92] 82  Resp:  [12-20] 17  BP: ()/(35-87) 139/87  SpO2:  [90 %-100 %] 94 %    Physical Exam  Vitals and nursing note reviewed.   Constitutional:       General: He is not in acute distress.     Appearance: He is not toxic-appearing.   HENT:      Right Ear: External ear normal.      Left Ear: External ear normal.      Mouth/Throat:      Pharynx: No oropharyngeal exudate.   Eyes:      General: No scleral  icterus.  Cardiovascular:      Heart sounds: No murmur heard.  Pulmonary:      Breath sounds: No wheezing.   Abdominal:      Tenderness: There is no abdominal tenderness. There is no guarding or rebound.   Musculoskeletal:         General: No swelling or deformity.   Skin:     Coloration: Skin is not jaundiced.      Findings: No bruising.   Neurological:      General: No focal deficit present.      Mental Status: He is alert and oriented to person, place, and time.      Motor: No weakness.   Psychiatric:         Mood and Affect: Mood normal.         Behavior: Behavior normal.         Fluids    Intake/Output Summary (Last 24 hours) at 7/23/2024 1240  Last data filed at 7/23/2024 1217  Gross per 24 hour   Intake 1200 ml   Output 650 ml   Net 550 ml       Laboratory  Recent Labs     07/22/24  1820   WBC 7.8   RBC 4.23*   HEMOGLOBIN 11.6*   HEMATOCRIT 34.8*   MCV 82.3   MCH 27.4   MCHC 33.3   RDW 38.5   PLATELETCT 279   MPV 9.6     Recent Labs     07/22/24  1820   SODIUM 135   POTASSIUM 4.1   CHLORIDE 90*   CO2 29   GLUCOSE 433*   BUN 19   CREATININE 0.87   CALCIUM 8.9                   Imaging  EC-ECHOCARDIOGRAM COMPLETE W/O CONT   Final Result      CT-CTA CHEST PULMONARY ARTERY W/ RECONS   Final Result         1.  No pulmonary embolus appreciated.      DX-CHEST-PORTABLE (1 VIEW)   Final Result      No acute cardiopulmonary abnormality identified.           Assessment/Plan  * Acute hypoxic respiratory failure (HCC)- (present on admission)  Assessment & Plan  Unclear etiology  CXR and CTA chest unrevealing  Supportive care, IS and wean O2 as tolerated    Elevated BNP  Worsened shortness of breath when laying flat  IV lasix daily  Check Echo    Generalized weakness- (present on admission)  Assessment & Plan  And frequent falls  PT/OT eval     Uncontrolled type 2 diabetes mellitus with hyperglycemia (HCC)- (present on admission)  Assessment & Plan  C/w home insulin glargine   ISS and monitor coverage needs          VTE  prophylaxis:   SCDs/TEDs      I have performed a physical exam and reviewed and updated ROS and Plan today (7/23/2024). In review of yesterday's note (7/22/2024), there are no changes except as documented above.      Greater than 51 minutes spent prepping to see patient (e.g. review of tests) obtaining and/or reviewing separately obtained history. Performing a medically appropriate examination and/ evaluation.  Counseling and educating the patient/family/caregiver.  Ordering medications, tests, or procedures.  Referring and communicating with other health care professionals.  Documenting clinical information in EPIC.  Independently interpreting results and communicating results to patient/family/caregiver.  Care coordination.

## 2024-07-23 NOTE — ASSESSMENT & PLAN NOTE
Unclear etiology  CXR and CTA chest unrevealing  Supportive care, IS and wean O2 as tolerated  SpO2 in the 70's on presentation    Elevated BNP  Worsened shortness of breath when laying flat  IV lasix daily - with blood pressure in the 70's today requirign frequent and close monitoring by staff  Echo reviewed - normal EF  Now on room air

## 2024-07-23 NOTE — H&P
Hospital Medicine History & Physical Note    Date of Service  7/22/2024    Primary Care Physician  YESY Tariq.    Consultants  N/A    Code Status  Full Code    Chief Complaint  Chief Complaint   Patient presents with    Hypotension     Pt seen yesterday for a GLF and reports not feeling well since discharge. Pt unable to make follow up PCP appointment today so he came to the ED. Hypotensive in the 70s in triage.        History of Presenting Illness  Christoph Taylor is a 59 y.o. male with DM on insulin, CKD, HLD, GERD, chronic back pain and recent history of cauda equina syndrome s/p C4-C7 ACDF on 6/4 and L4-L5 laminectomies with stealth fusion 6/7 who presented 7/22/2024 with generalized malaise - just not feeling well and he cannot point to any particular issues. He reported a GLF yesterday - he was evaluated in ED yesterday - workups unremarkable and DC home. He reported generalized weakness.    Pt denies fever, chills, lightheadedness, vision changes, chest pain, palpitation, SOB, dysphagia, abdominal pain, changes in urinary or bowel habits, arthralgia or leg edema. Pt denies recent travel history or sick contacts.     Upon arrival to ED, Pt was quite hypotensive 70/35 - responded to IVF. The patient was hypoxic to 70s in room air and placed on 3L NC. Exam was grossly unremarkable. Labs done were with elevated proBNP 1704. Trop 52 > 36, D-dimer 1.05 and mild normocytic anemia. CXR personally reviewed was without acute cardiopulm abnormalities. EKG done personally reviewed was with NSR and no acute ischemic changes.    Pt was provided with acetaminophen, IVF and oxycodone.     Pt was then referred to Hospitalist services for further management.     I discussed the plan of care with patient, bedside RN, and ERP .    Review of Systems  Review of Systems   Constitutional:  Positive for malaise/fatigue. Negative for chills and fever.   HENT:  Negative for congestion and sore throat.    Eyes:  Negative  for blurred vision and double vision.   Respiratory:  Negative for cough, sputum production and shortness of breath.    Cardiovascular:  Negative for chest pain, palpitations, orthopnea, leg swelling and PND.   Gastrointestinal:  Negative for abdominal pain, heartburn, nausea and vomiting.   Genitourinary:  Negative for dysuria, frequency and urgency.   Musculoskeletal:  Positive for falls. Negative for myalgias and neck pain.   Neurological:  Positive for weakness. Negative for dizziness, focal weakness and headaches.   Psychiatric/Behavioral:  Negative for depression.        Past Medical History   has a past medical history of Abnormal electrocardiogram (ECG) (EKG) (11/8/2021), KRISTAL (acute kidney injury) ([facility]) (11/8/2021), Chest pain in adult (11/8/2021), CKD (chronic kidney disease) stage 3, GFR 30-59 ml/min (11/7/2021), Diabetes ([facility]), Diabetic ketoacidosis without coma associated with type 2 diabetes mellitus ([facility]) (11/7/2021), Diarrhea (3/18/2022), DKA, type 1, not at goal ([facility]) (7/28/2022), Esophagitis (7/28/2022), [place] (hard of hearing), and Hypercholesteremia.    Surgical History   has a past surgical history that includes other; pr upper gi endoscopy,diagnosis (N/A, 3/14/2023); finger or hand incision and drainage (Right, 6/12/2023); cervical disk and fusion anterior (N/A, 6/4/2024); fusion, spine, lumbar, plif (N/A, 6/7/2024); and lumbar laminectomy diskectomy (N/A, 6/7/2024).     Family History  family history includes Heart Disease in his father.   Family history reviewed with patient. There is family history that is pertinent to the chief complaint.     Social History   reports that he has quit smoking. He has never used smokeless tobacco. He reports that he does not currently use alcohol. He reports that he does not currently use drugs.    Allergies  Allergies   Allergen Reactions    Ketamine      aggressive       Medications  Prior to Admission Medications   Prescriptions Last Dose Informant Patient  Reported? Taking?   acetaminophen (TYLENOL) 325 MG Tab   No No   Sig: Take 2 Tablets by mouth every four hours as needed for Mild Pain, Moderate Pain or Fever. 2 tablets= 650mg   aspirin 81 MG EC tablet  MAR from Other Facility Yes No   Sig: Take 81 mg by mouth every evening.   cyanocobalamin (VITAMIN B-12) 500 MCG Tab  MAR from Other Facility Yes No   Sig: Take 1,000 mcg by mouth every day. 2 tablets= 1000mg   diphenhydrAMINE (BENADRYL) 25 MG Tab  MAR from Other Facility No No   Sig: Take 1 Tablet by mouth every 6 hours as needed for Itching.   docusate sodium 100 MG Cap  MAR from Other Facility No No   Sig: Take 100 mg by mouth 2 times a day.   gabapentin (NEURONTIN) 100 MG Cap   No No   Sig: Take 1 Capsule by mouth 3 times a day.   insulin GLARGINE (LANTUS,SEMGLEE) 100 UNIT/ML Solution   No No   Sig: Inject 10 Units under the skin every evening.   insulin regular (HUMULIN R/NOVOLIN R) 100 Unit/mL Solution   No No   Sig: Inject 2-10 Units under the skin 4 Times a Day,Before Meals and at Bedtime. For glucose:  70   - 150  mg/dL =      0 Units  151 - 200  mg/dL =     1 Units  201 - 250  mg/dL =    2 Units  251 - 300  mg/dL  =   3 Units  301 - 350  mg/dL  =   4 Units  351 - 400 mg/dL   =   5 Units  Over 400 mg/dL   =   6 Units   ipratropium-albuterol (DUONEB) 0.5-2.5 (3) MG/3ML nebulizer solution  MAR from Other Facility Yes No   Sig: Take 3 mL by nebulization every 4 hours.   lidocaine (ASPERFLEX) 4 % Patch   No No   Sig: Place 1 Patch on the skin every 24 hours.   magnesium hydroxide (MILK OF MAGNESIA) 400 MG/5ML Suspension   No No   Sig: Take 30 mL by mouth every 72 hours. As needed for constipation   melatonin 5 mg Tab  MAR from Other Facility No No   Sig: Take 1 Tablet by mouth at bedtime as needed (Sleep).   methocarbamol (ROBAXIN) 500 MG Tab  MAR from Other Facility No No   Sig: Take 1 Tablet by mouth every 8 hours as needed (muscle spasm).   omeprazole (PRILOSEC) 40 MG delayed-release capsule   No No   Sig:  Take 1 Capsule by mouth every day.   ondansetron (ZOFRAN ODT) 4 MG TABLET DISPERSIBLE  MAR from Other Facility No No   Sig: Dissolve 1 Tablet by mouth every four hours as needed for Nausea/Vomiting   senna-docusate (PERICOLACE OR SENOKOT S) 8.6-50 MG Tab  MAR from Other Facility No No   Sig: Take 1 Tablet by mouth every evening.   Patient taking differently: Take 1 Tablet by mouth at bedtime.   tamsulosin (FLOMAX) 0.4 MG capsule   No No   Sig: Take 1 Capsule by mouth every day.   venlafaxine XR (EFFEXOR XR) 75 MG CAPSULE SR 24 HR  MAR from Other Facility No No   Sig: Take 1 Capsule by mouth every morning with breakfast.   vitamin D3 2000 UNIT Tab  MAR from Other Facility No No   Sig: Take 1 Tablet by mouth every day.      Facility-Administered Medications: None       Physical Exam  Temp:  [36.7 °C (98 °F)] 36.7 °C (98 °F)  Pulse:  [78-92] 83  Resp:  [12-20] 16  BP: ()/(35-87) 153/79  SpO2:  [90 %-100 %] 100 %  Blood Pressure: (!) 156/85   Temperature: 36.7 °C (98 °F)   Pulse: 90   Respiration: 19   Pulse Oximetry: 99 %       Physical Exam  Vitals and nursing note reviewed.   Constitutional:       General: He is not in acute distress.     Appearance: Normal appearance. He is not ill-appearing.   HENT:      Head: Normocephalic and atraumatic.      Mouth/Throat:      Mouth: Mucous membranes are moist.      Pharynx: Oropharynx is clear.   Eyes:      General: No scleral icterus.     Conjunctiva/sclera: Conjunctivae normal.   Cardiovascular:      Rate and Rhythm: Normal rate and regular rhythm.      Pulses: Normal pulses.      Heart sounds: Normal heart sounds.   Pulmonary:      Effort: Pulmonary effort is normal. No respiratory distress.      Breath sounds: Normal breath sounds. No wheezing.   Abdominal:      General: Bowel sounds are normal. There is no distension.      Palpations: Abdomen is soft.      Tenderness: There is no abdominal tenderness.   Musculoskeletal:         General: No swelling or tenderness.  Normal range of motion.   Skin:     General: Skin is warm and dry.      Capillary Refill: Capillary refill takes less than 2 seconds.   Neurological:      General: No focal deficit present.      Mental Status: He is alert and oriented to person, place, and time. Mental status is at baseline.   Psychiatric:         Mood and Affect: Mood normal.         Laboratory:  Recent Labs     07/22/24 1820   WBC 7.8   RBC 4.23*   HEMOGLOBIN 11.6*   HEMATOCRIT 34.8*   MCV 82.3   MCH 27.4   MCHC 33.3   RDW 38.5   PLATELETCT 279   MPV 9.6     Recent Labs     07/22/24  1820   SODIUM 135   POTASSIUM 4.1   CHLORIDE 90*   CO2 29   GLUCOSE 433*   BUN 19   CREATININE 0.87   CALCIUM 8.9     Recent Labs     07/22/24 1820   ALTSGPT 8   ASTSGOT 9*   ALKPHOSPHAT 115*   TBILIRUBIN 0.3   GLUCOSE 433*         Recent Labs     07/22/24 1820   NTPROBNP 1704*         Recent Labs     07/22/24 1820 07/22/24 2009   TROPONINT 52* 36*       Imaging:  CT-CTA CHEST PULMONARY ARTERY W/ RECONS   Final Result         1.  No pulmonary embolus appreciated.      DX-CHEST-PORTABLE (1 VIEW)   Final Result      No acute cardiopulmonary abnormality identified.      EC-ECHOCARDIOGRAM COMPLETE W/O CONT    (Results Pending)     CXR personally reviewed was without acute cardiopulm abnormalities.     EKG done personally reviewed was with NSR and no acute ischemic changes.    Assessment/Plan:  Justification for Admission Status  I anticipate this patient will require at least two midnights for appropriate medical management, necessitating inpatient admission because acute hypoxic respiratory failure of unclear etiology and generalized weakness.     Patient will need a Telemetry bed on MEDICAL service .  The need is secondary to as above.    * Acute hypoxic respiratory failure (HCC)- (present on admission)  Assessment & Plan  Unclear etiology  CXR and CTA chest unrevealing  Elevated proBNP - will check echo   Supportive care, IS and wean O2 as tolerated    Generalized  weakness- (present on admission)  Assessment & Plan  And frequent falls  PT/OT eval     Uncontrolled type 2 diabetes mellitus with hyperglycemia (HCC)- (present on admission)  Assessment & Plan  C/w home insulin glargine   ISS and monitor coverage needs         VTE prophylaxis: enoxaparin ppx

## 2024-07-23 NOTE — ED PROVIDER NOTES
ED Provider Note    CHIEF COMPLAINT  Chief Complaint   Patient presents with    Hypotension     Pt seen yesterday for a GLF and reports not feeling well since discharge. Pt unable to make follow up PCP appointment today so he came to the ED. Hypotensive in the 70s in triage.        EXTERNAL RECORDS REVIEWED  Inpatient Notes Patient was discharged from the hospital on 18 June.  He was admitted for cauda equina syndrome 5/27-6/11. He is S/p C4-C7 ACDF on 6/4 and L4-L5 laminectomies with stealth fusion 6/7. Patient was discharged from [facility] 6/11 to [facility].  He presented to the ER and was found to have new elevation of LFTs and anemia and received 1 unit of PRBCs he was also found to have a multifocal pneumonia.    HPI/ROS  LIMITATION TO HISTORY   None  OUTSIDE HISTORIAN(S):  None    Christoph Taylor is a 59 y.o. male who presents feeling generally unwell.  Said that he has been feeling like this all day and actually was feeling unwell when he was seen in the ER last night.  He says however that he was going to go to his PCP to follow-up today but could not make it that due to not feeling well.  He is unable to specify, he is not having fevers, chills, pain, vomiting, dysuria, hematuria, diarrhea.  No URI symptoms specifically no cough.  He does state that he feels mildly short of breath.  He does feel generally fatigued.  No lightheadedness or dizziness      Patient was seen in the ER for ground-level fall yesterday.  CT head and neck was normal.    PAST MEDICAL HISTORY   has a past medical history of Abnormal electrocardiogram (ECG) (EKG) (11/8/2021), KRISTAL (acute kidney injury) ([facility]) (11/8/2021), Chest pain in adult (11/8/2021), CKD (chronic kidney disease) stage 3, GFR 30-59 ml/min (11/7/2021), Diabetes ([facility]), Diabetic ketoacidosis without coma associated with type 2 diabetes mellitus ([facility]) (11/7/2021), Diarrhea (3/18/2022), DKA, type 1, not at goal ([facility]) (7/28/2022), Esophagitis (7/28/2022), [place]  (hard of hearing), and Hypercholesteremia.    SURGICAL HISTORY   has a past surgical history that includes other; upper gi endoscopy,diagnosis (N/A, 3/14/2023); finger or hand incision and drainage (Right, 6/12/2023); cervical disk and fusion anterior (N/A, 6/4/2024); fusion, spine, lumbar, plif (N/A, 6/7/2024); and lumbar laminectomy diskectomy (N/A, 6/7/2024).    FAMILY HISTORY  Family History   Problem Relation Age of Onset    Heart Disease Father        SOCIAL HISTORY  Social History     Tobacco Use    Smoking status: Former    Smokeless tobacco: Never   Vaping Use    Vaping status: Not on file   Substance and Sexual Activity    Alcohol use: Not Currently    Drug use: Not Currently    Sexual activity: Not on file       CURRENT MEDICATIONS  Home Medications       Reviewed by Guerita Ballesteros R.N. (Registered Nurse) on 07/23/24 at 0433  Med List Status: Complete     Medication Last Dose Status   aspirin 81 MG EC tablet 7/21/2024 Active   insulin GLARGINE (LANTUS,SEMGLEE) 100 UNIT/ML Solution 7/21/2024 Active   oxyCODONE immediate release (ROXICODONE) 10 MG immediate release tablet 7/22/2024 Active                  Audit from Redirected Encounters    **Home medications have not yet been reviewed for this encounter**         ALLERGIES  Allergies   Allergen Reactions    Ketamine      aggressive       PHYSICAL EXAM  VITAL SIGNS: /87   Pulse 82   Temp 36.6 °C (97.9 °F) (Temporal)   Resp 17   Ht 1.829 m (6')   Wt 75.7 kg (166 lb 14.2 oz)   SpO2 94%   BMI 22.63 kg/m²    Constitutional: Awake and alert . Non toxic  HENT: Normal inspection  Dry mucous membranes  Eyes: Normal inspection  Neck: Grossly normal range of motion.  Cardiovascular: Normal heart rate, Normal rhythm.  Symmetric peripheral pulses.   Thorax & Lungs: He is on 3L NC. No respiratory distress, No wheezing, No rales, No rhonchi, No chest tenderness.   Abdomen: Soft, non-distended, nontender to palpation in all 4 quadrants, no mass  Skin: No  obvious rash.  Extremities: Warm, well perfused. No clubbing, cyanosis, edema   Neurologic: Grossly normal   Psychiatric: Normal for situation      EKG/LABS  Results for orders placed or performed during the hospital encounter of 07/22/24   EC-ECHOCARDIOGRAM COMPLETE W/O CONT   Result Value Ref Range    Eject.Frac. MOD BP 61.13     Eject.Frac. MOD 4C 64.21     Eject.Frac. MOD 2C 58.46     Left Ventrical Ejection Fraction 61    Lactic Acid   Result Value Ref Range    Lactic Acid 1.5 0.5 - 2.0 mmol/L   CBC with Differential   Result Value Ref Range    WBC 7.8 4.8 - 10.8 K/uL    RBC 4.23 (L) 4.70 - 6.10 M/uL    Hemoglobin 11.6 (L) 14.0 - 18.0 g/dL    Hematocrit 34.8 (L) 42.0 - 52.0 %    MCV 82.3 81.4 - 97.8 fL    MCH 27.4 27.0 - 33.0 pg    MCHC 33.3 32.3 - 36.5 g/dL    RDW 38.5 35.9 - 50.0 fL    Platelet Count 279 164 - 446 K/uL    MPV 9.6 9.0 - 12.9 fL    Neutrophils-Polys 74.50 (H) 44.00 - 72.00 %    Lymphocytes 17.40 (L) 22.00 - 41.00 %    Monocytes 6.40 0.00 - 13.40 %    Eosinophils 1.10 0.00 - 6.90 %    Basophils 0.10 0.00 - 1.80 %    Immature Granulocytes 0.50 0.00 - 0.90 %    Nucleated RBC 0.00 0.00 - 0.20 /100 WBC    Neutrophils (Absolute) 5.83 1.82 - 7.42 K/uL    Lymphs (Absolute) 1.36 1.00 - 4.80 K/uL    Monos (Absolute) 0.50 0.00 - 0.85 K/uL    Eos (Absolute) 0.09 0.00 - 0.51 K/uL    Baso (Absolute) 0.01 0.00 - 0.12 K/uL    Immature Granulocytes (abs) 0.04 0.00 - 0.11 K/uL    NRBC (Absolute) 0.00 K/uL   Complete Metabolic Panel   Result Value Ref Range    Sodium 135 135 - 145 mmol/L    Potassium 4.1 3.6 - 5.5 mmol/L    Chloride 90 (L) 96 - 112 mmol/L    Co2 29 20 - 33 mmol/L    Anion Gap 16.0 7.0 - 16.0    Glucose 433 (H) 65 - 99 mg/dL    Bun 19 8 - 22 mg/dL    Creatinine 0.87 0.50 - 1.40 mg/dL    Calcium 8.9 8.5 - 10.5 mg/dL    Correct Calcium 9.3 8.5 - 10.5 mg/dL    AST(SGOT) 9 (L) 12 - 45 U/L    ALT(SGPT) 8 2 - 50 U/L    Alkaline Phosphatase 115 (H) 30 - 99 U/L    Total Bilirubin 0.3 0.1 - 1.5 mg/dL     Albumin 3.5 3.2 - 4.9 g/dL    Total Protein 6.6 6.0 - 8.2 g/dL    Globulin 3.1 1.9 - 3.5 g/dL    A-G Ratio 1.1 g/dL   Urinalysis    Specimen: Urine   Result Value Ref Range    Color Yellow     Character Clear     Specific Gravity 1.020 <1.035    Ph 6.5 5.0 - 8.0    Glucose >=1000 (A) Negative mg/dL    Ketones 40 (A) Negative mg/dL    Protein 30 (A) Negative mg/dL    Bilirubin Negative Negative    Urobilinogen, Urine 0.2 Negative    Nitrite Negative Negative    Leukocyte Esterase Negative Negative    Occult Blood Negative Negative    Micro Urine Req Microscopic    Blood Culture - Draw one from central line and one from peripheral site    Specimen: Peripheral; Blood   Result Value Ref Range    Significant Indicator NEG     Source BLD     Site Peripheral     Culture Result       No Growth  Note: Blood cultures are incubated for 5 days and  are monitored continuously.Positive blood cultures  are called to the RN and reported as soon as  they are identified.     Blood Culture - Draw one from central line and one from peripheral site    Specimen: Line; Blood   Result Value Ref Range    Significant Indicator NEG     Source BLD     Site Peripheral     Culture Result       No Growth  Note: Blood cultures are incubated for 5 days and  are monitored continuously.Positive blood cultures  are called to the RN and reported as soon as  they are identified.     TROPONIN   Result Value Ref Range    Troponin T 52 (H) 6 - 19 ng/L   proBrain Natriuretic Peptide, NT   Result Value Ref Range    NT-proBNP 1704 (H) 0 - 125 pg/mL   ESTIMATED GFR   Result Value Ref Range    GFR (CKD-EPI) 99 >60 mL/min/1.73 m 2   TROPONIN   Result Value Ref Range    Troponin T 36 (H) 6 - 19 ng/L   D-DIMER   Result Value Ref Range    D-Dimer 1.05 (H) 0.00 - 0.50 ug/mL (FEU)   URINE MICROSCOPIC (W/UA)   Result Value Ref Range    WBC 0-2 (A) /hpf    RBC 0-2 (A) /hpf    Bacteria Negative None /hpf    Epithelial Cells Negative /hpf    Hyaline Cast 0-2 /lpf   EKG    Result Value Ref Range    Report       [facility] Emergency Dept[facility]    Test Date:  2024  Pt Name:    BAILEE JACOBSON               Department: ER  MRN:        [id number]                      Room:  Gender:     Male                         Technician: [id number]  :        1965                   Requested By:ER TRIAGE PROTOCOL  Order #:    [id number]                    Reading MD: Nelli Culp    Measurements  Intervals                                Axis  Rate:       85                           P:          74  ID:         [id number]                          QRS:        64  QRSD:       91                           T:          60  QT:         381  QTc:        453    Interpretive Statements  Sinus rhythm  Compared to ECG 2024 19:42:21  Possible ischemia no longer present  Electronically Signed On 2024 18:31:54 PDT by Nelli Culp         I have independently interpreted this EKG    RADIOLOGY/PROCEDURES   I have independently interpreted the diagnostic imaging associated with this visit and am waiting the final reading from the radiologist.   My preliminary interpretation is as follows: No focal opacity    Radiologist interpretation:  EC-ECHOCARDIOGRAM COMPLETE W/O CONT   Final Result      CT-CTA CHEST PULMONARY ARTERY W/ RECONS   Final Result         1.  No pulmonary embolus appreciated.      DX-CHEST-PORTABLE (1 VIEW)   Final Result      No acute cardiopulmonary abnormality identified.          COURSE & MEDICAL DECISION MAKING    ASSESSMENT, COURSE AND PLAN  Care Narrative: This is a 59-year-old male who presents with vague symptoms of malaise and feeling unwell.  On arrival he is  hypotensive (systolic BP in 70s) and hypoxic with initial room air saturations in the 70s.  He was placed on 3 L nasal cannula. He has no signs of respiratory failure.  He is anemic however improved from prior, otherwise no significant electrolyte derangements.  His lactic acid is normal.  His troponin is  slightly elevated above his baseline at 52, but EKG non ischemic and repeat trop downtrending.  His BNP at 1704 there are no signs of pulmonary edema on x-ray and does not appear grossly volume overloaded. CTA without pulmonary embolism. He has no specific signs of infection, chest x-ray without pneumonia.  Urine without infection.  Blood cultures were obtained for possible occult infection, though he has no signs to suggest sepsis and no indication to start empiric antibiotics.     Patient was given a fluid bolus and his blood pressure responded well, now normal though he still has oxygen requirement. Unclear etiology for this, patient does not feel improved.  I will admit for further management       Hydration: Based on the patient's presentation of Hypotension the patient was given IV fluids. IV Hydration was used because oral hydration was not adequate alone. Upon recheck following hydration, the patient was improved.            DISPOSITION AND DISCUSSIONS  I have discussed management of the patient with the following physicians and MICHELLE's:  Dr. Chavarria    Discussion of management with other \[facility]\"" or appropriate source(s): None     FINAL DIAGNOSIS  1. Acute hypoxic respiratory failure (HCC) Acute   2. Hypotension, unspecified hypotension type Acute   3. Elevated troponin Acute   4. Elevated brain natriuretic peptide (BNP) level Acute   5. Generalized weakness Acute          Electronically signed by: Nelli Culp M.D., 7/22/2024 6:20 PM

## 2024-07-23 NOTE — PROGRESS NOTES
4 Eyes Skin Assessment Completed by PENNIE DUFFY and JOSE MARSHALL.    Head WDL  Ears WDL  Nose WDL  Mouth WDL  Neck WDL  Breast/Chest WDL  Shoulder Blades WDL  Spine Redness and Blanching  (R) Arm/Elbow/Hand WDL  (L) Arm/Elbow/Hand WDL  Abdomen Bruising  Groin Redness and Blanching  Scrotum/Coccyx/Buttocks Redness and Blanching  (R) Leg WDL  (L) Leg Redness  (R) Heel/Foot/Toe Redness and Ulcer(s)  (L) Heel/Foot/Toe Redness and Blanching          Devices In Places ECG, Tele Box, Blood Pressure Cuff, Pulse Ox, and SCD's      Interventions In Place Gray Ear Foams    Possible Skin Injury Yes    Pictures Uploaded Into Epic Yes  Wound Consult Placed Yes  RN Wound Prevention Protocol Ordered Yes

## 2024-07-23 NOTE — ED TRIAGE NOTES
Chief Complaint   Patient presents with    Hypotension     Pt seen yesterday for a GLF and reports not feeling well since discharge. Pt unable to make follow up PCP appointment today so he came to the ED. Hypotensive in the 70s in triage.       Pt roomed to red 7 from the lobby. PIV established, blood collected and sent to lab.

## 2024-07-23 NOTE — DISCHARGE PLANNING
Patient rolled back to observation/outpatient status per attending MD determination, Alec Shepherd, and UR committee IPA secondary reviewer, Yadi Kelley. Condition code 44 completed.

## 2024-07-23 NOTE — PROGRESS NOTES
Received patient from ER per malcolm. Alert and oriented x 4, x 1 assist to stand up scale and to bed. In no apparent distress. On 2LNC. Denies pain. Tele on and heart rhythm and rate checked monitor tech.     Wound to right foot cleansed and covered with gauze. Condom catheter applied.    Admission questions completed. Plan of care monitor v/s, titrate 02, provide comfort and safety. He verbalized understanding.    Safety precautions in place are treaded socks on, side rails up x 3, bed to lowest level and alarms on. Appropriate to use call light for needs and assistance. Will round hourly and as needed.

## 2024-07-23 NOTE — ED NOTES
Med rec updated and complete. Allergies reviewed.  Pt confirmed name and date of birth.    Pt denies antibiotic use in last 30 days.      No anticoagulant medications.  Last ASA dose 07/21/24.        Home pharmacy  [facility] = [phone]

## 2024-07-24 ENCOUNTER — APPOINTMENT (OUTPATIENT)
Dept: RADIOLOGY | Facility: MEDICAL CENTER | Age: 59
DRG: 189 | End: 2024-07-24
Attending: INTERNAL MEDICINE
Payer: COMMERCIAL

## 2024-07-24 PROBLEM — S91.309A OPEN WOUND OF FOOT: Status: ACTIVE | Noted: 2024-07-24

## 2024-07-24 LAB
ALBUMIN SERPL BCP-MCNC: 3.3 G/DL (ref 3.2–4.9)
BASOPHILS # BLD AUTO: 0.2 % (ref 0–1.8)
BASOPHILS # BLD: 0.01 K/UL (ref 0–0.12)
BUN SERPL-MCNC: 15 MG/DL (ref 8–22)
CALCIUM ALBUM COR SERPL-MCNC: 9.8 MG/DL (ref 8.5–10.5)
CALCIUM SERPL-MCNC: 9.2 MG/DL (ref 8.5–10.5)
CHLORIDE SERPL-SCNC: 91 MMOL/L (ref 96–112)
CO2 SERPL-SCNC: 32 MMOL/L (ref 20–33)
CREAT SERPL-MCNC: 0.66 MG/DL (ref 0.5–1.4)
CRP SERPL HS-MCNC: 1.44 MG/DL (ref 0–0.75)
EOSINOPHIL # BLD AUTO: 0.14 K/UL (ref 0–0.51)
EOSINOPHIL NFR BLD: 2.5 % (ref 0–6.9)
ERYTHROCYTE [DISTWIDTH] IN BLOOD BY AUTOMATED COUNT: 38.9 FL (ref 35.9–50)
ERYTHROCYTE [SEDIMENTATION RATE] IN BLOOD BY WESTERGREN METHOD: 43 MM/HOUR (ref 0–20)
GFR SERPLBLD CREATININE-BSD FMLA CKD-EPI: 107 ML/MIN/1.73 M 2
GLUCOSE BLD STRIP.AUTO-MCNC: 156 MG/DL (ref 65–99)
GLUCOSE BLD STRIP.AUTO-MCNC: 293 MG/DL (ref 65–99)
GLUCOSE BLD STRIP.AUTO-MCNC: 404 MG/DL (ref 65–99)
GLUCOSE SERPL-MCNC: 357 MG/DL (ref 65–99)
HCT VFR BLD AUTO: 32.1 % (ref 42–52)
HGB BLD-MCNC: 10.8 G/DL (ref 14–18)
IMM GRANULOCYTES # BLD AUTO: 0.02 K/UL (ref 0–0.11)
IMM GRANULOCYTES NFR BLD AUTO: 0.4 % (ref 0–0.9)
LYMPHOCYTES # BLD AUTO: 1.27 K/UL (ref 1–4.8)
LYMPHOCYTES NFR BLD: 23 % (ref 22–41)
MAGNESIUM SERPL-MCNC: 1.7 MG/DL (ref 1.5–2.5)
MCH RBC QN AUTO: 27.6 PG (ref 27–33)
MCHC RBC AUTO-ENTMCNC: 33.6 G/DL (ref 32.3–36.5)
MCV RBC AUTO: 81.9 FL (ref 81.4–97.8)
MONOCYTES # BLD AUTO: 0.52 K/UL (ref 0–0.85)
MONOCYTES NFR BLD AUTO: 9.4 % (ref 0–13.4)
NEUTROPHILS # BLD AUTO: 3.56 K/UL (ref 1.82–7.42)
NEUTROPHILS NFR BLD: 64.5 % (ref 44–72)
NRBC # BLD AUTO: 0 K/UL
NRBC BLD-RTO: 0 /100 WBC (ref 0–0.2)
PHOSPHATE SERPL-MCNC: 2.9 MG/DL (ref 2.5–4.5)
PLATELET # BLD AUTO: 243 K/UL (ref 164–446)
PMV BLD AUTO: 9.2 FL (ref 9–12.9)
POTASSIUM SERPL-SCNC: 3.6 MMOL/L (ref 3.6–5.5)
RBC # BLD AUTO: 3.92 M/UL (ref 4.7–6.1)
SODIUM SERPL-SCNC: 134 MMOL/L (ref 135–145)
WBC # BLD AUTO: 5.5 K/UL (ref 4.8–10.8)

## 2024-07-24 PROCEDURE — 97602 WOUND(S) CARE NON-SELECTIVE: CPT

## 2024-07-24 PROCEDURE — 700111 HCHG RX REV CODE 636 W/ 250 OVERRIDE (IP): Mod: JZ | Performed by: INTERNAL MEDICINE

## 2024-07-24 PROCEDURE — 96374 THER/PROPH/DIAG INJ IV PUSH: CPT

## 2024-07-24 PROCEDURE — A9270 NON-COVERED ITEM OR SERVICE: HCPCS | Performed by: STUDENT IN AN ORGANIZED HEALTH CARE EDUCATION/TRAINING PROGRAM

## 2024-07-24 PROCEDURE — 86140 C-REACTIVE PROTEIN: CPT

## 2024-07-24 PROCEDURE — 96372 THER/PROPH/DIAG INJ SC/IM: CPT

## 2024-07-24 PROCEDURE — 99232 SBSQ HOSP IP/OBS MODERATE 35: CPT | Performed by: INTERNAL MEDICINE

## 2024-07-24 PROCEDURE — 700111 HCHG RX REV CODE 636 W/ 250 OVERRIDE (IP): Mod: JZ | Performed by: STUDENT IN AN ORGANIZED HEALTH CARE EDUCATION/TRAINING PROGRAM

## 2024-07-24 PROCEDURE — 700102 HCHG RX REV CODE 250 W/ 637 OVERRIDE(OP): Performed by: INTERNAL MEDICINE

## 2024-07-24 PROCEDURE — 85025 COMPLETE CBC W/AUTO DIFF WBC: CPT

## 2024-07-24 PROCEDURE — 36415 COLL VENOUS BLD VENIPUNCTURE: CPT

## 2024-07-24 PROCEDURE — 83735 ASSAY OF MAGNESIUM: CPT

## 2024-07-24 PROCEDURE — 80069 RENAL FUNCTION PANEL: CPT

## 2024-07-24 PROCEDURE — 82962 GLUCOSE BLOOD TEST: CPT

## 2024-07-24 PROCEDURE — 73630 X-RAY EXAM OF FOOT: CPT | Mod: RT

## 2024-07-24 PROCEDURE — G0378 HOSPITAL OBSERVATION PER HR: HCPCS

## 2024-07-24 PROCEDURE — 85652 RBC SED RATE AUTOMATED: CPT

## 2024-07-24 PROCEDURE — 700102 HCHG RX REV CODE 250 W/ 637 OVERRIDE(OP): Performed by: STUDENT IN AN ORGANIZED HEALTH CARE EDUCATION/TRAINING PROGRAM

## 2024-07-24 RX ORDER — DEXTROSE MONOHYDRATE 25 G/50ML
25 INJECTION, SOLUTION INTRAVENOUS
Status: DISCONTINUED | OUTPATIENT
Start: 2024-07-24 | End: 2024-07-24

## 2024-07-24 RX ORDER — INSULIN LISPRO 100 [IU]/ML
2-9 INJECTION, SOLUTION INTRAVENOUS; SUBCUTANEOUS
Status: DISCONTINUED | OUTPATIENT
Start: 2024-07-24 | End: 2024-07-24

## 2024-07-24 RX ORDER — INSULIN LISPRO 100 [IU]/ML
0.2 INJECTION, SOLUTION INTRAVENOUS; SUBCUTANEOUS
Status: DISCONTINUED | OUTPATIENT
Start: 2024-07-24 | End: 2024-07-24

## 2024-07-24 RX ORDER — INSULIN LISPRO 100 [IU]/ML
0.2 INJECTION, SOLUTION INTRAVENOUS; SUBCUTANEOUS
Status: DISCONTINUED | OUTPATIENT
Start: 2024-07-24 | End: 2024-07-25

## 2024-07-24 RX ORDER — DEXTROSE MONOHYDRATE 25 G/50ML
25 INJECTION, SOLUTION INTRAVENOUS
Status: DISCONTINUED | OUTPATIENT
Start: 2024-07-24 | End: 2024-07-25

## 2024-07-24 RX ORDER — INSULIN LISPRO 100 [IU]/ML
2-9 INJECTION, SOLUTION INTRAVENOUS; SUBCUTANEOUS
Status: DISCONTINUED | OUTPATIENT
Start: 2024-07-24 | End: 2024-07-25

## 2024-07-24 RX ADMIN — FUROSEMIDE 40 MG: 10 INJECTION INTRAMUSCULAR; INTRAVENOUS at 04:36

## 2024-07-24 RX ADMIN — ENOXAPARIN SODIUM 40 MG: 100 INJECTION SUBCUTANEOUS at 17:00

## 2024-07-24 RX ADMIN — ASPIRIN 81 MG: 81 TABLET, COATED ORAL at 17:00

## 2024-07-24 RX ADMIN — INSULIN HUMAN 9 UNITS: 100 INJECTION, SOLUTION PARENTERAL at 11:25

## 2024-07-24 RX ADMIN — INSULIN LISPRO 2 UNITS: 100 INJECTION, SOLUTION INTRAVENOUS; SUBCUTANEOUS at 21:13

## 2024-07-24 RX ADMIN — OXYCODONE 5 MG: 5 TABLET ORAL at 04:36

## 2024-07-24 RX ADMIN — INSULIN LISPRO 5 UNITS: 100 INJECTION, SOLUTION INTRAVENOUS; SUBCUTANEOUS at 17:05

## 2024-07-24 RX ADMIN — OXYCODONE HYDROCHLORIDE 10 MG: 10 TABLET ORAL at 13:10

## 2024-07-24 ASSESSMENT — ENCOUNTER SYMPTOMS
SEIZURES: 0
SORE THROAT: 0
PALPITATIONS: 0
FEVER: 0
FALLS: 0
HEADACHES: 0
NAUSEA: 0
BLURRED VISION: 0
CHILLS: 0
ABDOMINAL PAIN: 0
COUGH: 0
DOUBLE VISION: 0
BACK PAIN: 0
SHORTNESS OF BREATH: 0
HALLUCINATIONS: 0
VOMITING: 0

## 2024-07-24 ASSESSMENT — LIFESTYLE VARIABLES: SUBSTANCE_ABUSE: 0

## 2024-07-24 ASSESSMENT — PAIN DESCRIPTION - PAIN TYPE
TYPE: ACUTE PAIN

## 2024-07-24 ASSESSMENT — FIBROSIS 4 INDEX: FIB4 SCORE: .7856742013183861381

## 2024-07-24 NOTE — PROGRESS NOTES
Report received from night shift RN, assumed patient care. Patient is calmly resting in bed, no signs of distress, even and unlabored breathing noted. Pt on 3 LPM NC O2. A&Ox4. Tele box on and in place. Patient has call light within reach, fall precautions in place. Patient states no needs at this time. Hourly rounding initiated.

## 2024-07-24 NOTE — CARE PLAN
The patient is Stable - Low risk of patient condition declining or worsening    Shift Goals  Clinical Goals: titrate O2, wound consult  Patient Goals: rest  Family Goals: updates on O2 and wound    Progress made toward(s) clinical / shift goals:  Patient weaned down to room air during shift today with SPO2 remaining above 90%. Wound consult placed and confirmed with wound charge nurse that he is on the list to be seen today. Sister in chart updated on the information above.       Problem: Knowledge Deficit - Standard  Goal: Patient and family/care givers will demonstrate understanding of plan of care, disease process/condition, diagnostic tests and medications  Outcome: Progressing     Problem: Skin Integrity  Goal: Skin integrity is maintained or improved  Outcome: Progressing     Problem: Fall Risk  Goal: Patient will remain free from falls  Outcome: Progressing     Problem: Pain - Standard  Goal: Alleviation of pain or a reduction in pain to the patient’s comfort goal  Outcome: Progressing

## 2024-07-24 NOTE — THERAPY
Physical Therapy Contact Note    Patient Name: Christoph Taylor  Age:  59 y.o., Sex:  male  Medical Record #: [id number]  Today's Date: 7/24/2024 07/24/24 1251   Initial Contact Note    Initial Contact Note Order Received and Verified, Physical Therapy Evaluation in Progress with Full Report to Follow.   Interdisciplinary Plan of Care Collaboration   IDT Collaboration with  Nursing   Collaboration Comments Per nurse, pt has a large wound on the plantar surface of his R MTPs.  PT to hold pending wound care consult for guidance on WB status.   Session Information   Date / Session Number  7/24- hold pending wound care consult for wound on plantar surface of R foot.

## 2024-07-24 NOTE — DISCHARGE PLANNING
MYRON notified DO that pt's HH agency is recommending pt go to SNF. Per DO, d/t pt's six click scores being 20, HH is currently ok for choice.     MYRON met with pt at bedside to obtain choice. Pt chose to resume HH with [facility]. MYRON provided pt with resources for getting in contact with [facility] for Medicaid screening, Meals on Wheels for rescheduling, transportation services, and food bank resources. MYRON informed pt of potential personal care aide services. Per pt, asked this SW to share all information with his sister. MYRON faxed choice to [facility].    Addendum @ 3541: Discussed pt during afternoon rounds. Pt ok to dc to SNF. Anticipate dc tomorrow, pending insurance auth. MYRON met with pt at bedside to obtain choice. Per pt, ok to dc to [facility] as he has been there before (was there for about a month). MYRON faxed choice form to [facility]. Gave verbal permission for this SW to sign choice as he has limited use of his hands at this time (states he cannot properly write).

## 2024-07-24 NOTE — DISCHARGE PLANNING
1300  Received Choice form at 1251  Agency/Facility Name: [facility]   Referral sent per Choice form @ 1300

## 2024-07-24 NOTE — ASSESSMENT & PLAN NOTE
No surrounding erythema or purulent discharge  Monitor at this time  Xray reviewed  Wound team consult

## 2024-07-24 NOTE — PROGRESS NOTES
Hospital Medicine Daily Progress Note    Date of Service  7/24/2024    Chief Complaint  Christoph Taylor is a 59 y.o. male admitted 7/22/2024 with shortness of breath    Hospital Course  No notes on file    Interval Problem Update  Patient was seen and examined at bedside.  No acute events overnight. Patient is resting comfortably in bed and in no acute distress.     IV lasix  Echo reviewed  Now on room air  Foot wound noted  Insulin regimen adjusted    I have discussed this patient's plan of care and discharge plan at IDT rounds today with Case Management, Nursing, Nursing leadership, and other members of the IDT team.    Code Status  Full Code    Disposition  The patient is not medically cleared for discharge to home or a post-acute facility.  Anticipate discharge to: skilled nursing facility    I have placed the appropriate orders for post-discharge needs.    Review of Systems  Review of Systems   Constitutional:  Negative for chills and fever.   HENT:  Negative for congestion and sore throat.    Eyes:  Negative for blurred vision and double vision.   Respiratory:  Negative for cough and shortness of breath.    Cardiovascular:  Negative for chest pain and palpitations.   Gastrointestinal:  Negative for abdominal pain, nausea and vomiting.   Genitourinary:  Negative for dysuria and frequency.   Musculoskeletal:  Negative for back pain and falls.   Skin:  Negative for rash.   Neurological:  Negative for seizures and headaches.   Psychiatric/Behavioral:  Negative for hallucinations and substance abuse.         Physical Exam  Temp:  [36.1 °C (97 °F)-36.6 °C (97.9 °F)] 36.5 °C (97.7 °F)  Pulse:  [66-77] 71  Resp:  [16-18] 16  BP: ()/(60-83) 94/60  SpO2:  [95 %-99 %] 95 %    Physical Exam  Vitals and nursing note reviewed.   Constitutional:       General: He is not in acute distress.     Appearance: He is not toxic-appearing.   HENT:      Right Ear: External ear normal.      Left Ear: External ear normal.       Mouth/Throat:      Pharynx: No oropharyngeal exudate.   Eyes:      General: No scleral icterus.  Cardiovascular:      Heart sounds: No murmur heard.  Pulmonary:      Breath sounds: No wheezing.   Abdominal:      Tenderness: There is no abdominal tenderness. There is no guarding or rebound.   Musculoskeletal:         General: No swelling or deformity.   Skin:     Coloration: Skin is not jaundiced.      Findings: No bruising.   Neurological:      General: No focal deficit present.      Mental Status: He is alert and oriented to person, place, and time.      Motor: No weakness.   Psychiatric:         Mood and Affect: Mood normal.         Behavior: Behavior normal.         Fluids    Intake/Output Summary (Last 24 hours) at 7/24/2024 1539  Last data filed at 7/24/2024 0800  Gross per 24 hour   Intake 240 ml   Output 1150 ml   Net -910 ml       Laboratory  Recent Labs     07/22/24  1820 07/24/24  0233   WBC 7.8 5.5   RBC 4.23* 3.92*   HEMOGLOBIN 11.6* 10.8*   HEMATOCRIT 34.8* 32.1*   MCV 82.3 81.9   MCH 27.4 27.6   MCHC 33.3 33.6   RDW 38.5 38.9   PLATELETCT 279 243   MPV 9.6 9.2     Recent Labs     07/22/24  1820 07/24/24  0233   SODIUM 135 134*   POTASSIUM 4.1 3.6   CHLORIDE 90* 91*   CO2 29 32   GLUCOSE 433* 357*   BUN 19 15   CREATININE 0.87 0.66   CALCIUM 8.9 9.2                   Imaging  DX-FOOT-COMPLETE 3+ RIGHT   Final Result      1.  No evidence of fracture or bony destructive change.      2.  Multifocal osteoarthritis.      EC-ECHOCARDIOGRAM COMPLETE W/O CONT   Final Result      CT-CTA CHEST PULMONARY ARTERY W/ RECONS   Final Result         1.  No pulmonary embolus appreciated.      DX-CHEST-PORTABLE (1 VIEW)   Final Result      No acute cardiopulmonary abnormality identified.           Assessment/Plan  * Acute hypoxic respiratory failure (HCC)- (present on admission)  Assessment & Plan  Unclear etiology  CXR and CTA chest unrevealing  Supportive care, IS and wean O2 as tolerated    Elevated BNP  Worsened  shortness of breath when laying flat  IV lasix daily  Echo reviewed - normal EF  Now on room air    Open wound of foot  Assessment & Plan  No surrounding erythema or purulent discharge  Monitor at this time  Xray reviewed  Wound team consult    Uncontrolled type 2 diabetes mellitus with hyperglycemia (HCC)- (present on admission)  Assessment & Plan  A1C 12.4  Increase Lantus QHS  Insulin sliding scale    Generalized weakness- (present on admission)  Assessment & Plan  And frequent falls  PT/OT eval          VTE prophylaxis:   SCDs/TEDs      I have performed a physical exam and reviewed and updated ROS and Plan today (7/24/2024). In review of yesterday's note (7/23/2024), there are no changes except as documented above.      Greater than 45 minutes spent prepping to see patient (e.g. review of tests) obtaining and/or reviewing separately obtained history. Performing a medically appropriate examination and/ evaluation.  Counseling and educating the patient/family/caregiver.  Ordering medications, tests, or procedures.  Referring and communicating with other health care professionals.  Documenting clinical information in EPIC.  Independently interpreting results and communicating results to patient/family/caregiver.  Care coordination.

## 2024-07-24 NOTE — PROGRESS NOTES
Assumed care on patient post bedside report. Alert and oriented x 4. In no apparent distress. On 4LNC. Vss Tele on and heart rhythm and rate checked on monitor. Denies pain.     Plan of care - titrate o2 and maintain comfort and safety.    Safety precautions in place are treaded socks on, side rails up x 2, bed to lowest level, alarms on. Appropriate to use call light for needs and assistance. Will round hourly and as needed.

## 2024-07-24 NOTE — DISCHARGE PLANNING
1142  Agency/Facility Name: [facility]   Spoke To: Arelis  Outcome: Shannon called to notify that pt is in service with [facility], but because pt has new amputation, the clinical supervisor at [facility] recommends pt going to SNF. SLY Baez notified.     1143  [facility] sent [facility] referrals @1143, requested by SLY Baez.     1214  Agency/Facility Name: [facility]   Spoke To: Pema   Outcome: [facility] called to notify that pt has been accepted, and will need prior insurance auth. DPA to notify with medical clearance/SNF choice. SLY Baez notified.     1300  Received Choice form at 1251  Agency/Facility Name: [facility]   Referral sent per Choice form @ 1300     1452  Agency/Facility Name: [facility]   Spoke To: Pema   Outcome: JESSIE called to notify if she can submit for insurance auth as pt most likely will be medically cleared tomorrow. Pema to will no submit for insurance auth.

## 2024-07-24 NOTE — CARE PLAN
The patient is Stable - Low risk of patient condition declining or worsening    Shift Goals  Clinical Goals: titrate o2, manage pain  Patient Goals: rest and comfort    Progress made toward(s) clinical / shift goals:      Problem: Knowledge Deficit - Standard  Goal: Patient and family/care givers will demonstrate understanding of plan of care, disease process/condition, diagnostic tests and medications  Description: Target End Date:  1-3 days or as soon as patient condition allows    Document in Patient Education    1.  Patient and family/caregiver oriented to unit, equipment, visitation policy and means for communicating concern  2.  Complete/review Learning Assessment  3.  Assess knowledge level of disease process/condition, treatment plan, diagnostic tests and medications  4.  Explain disease process/condition, treatment plan, diagnostic tests and medications  Outcome: Progressing     Problem: Skin Integrity  Goal: Skin integrity is maintained or improved  Description: Target End Date:  Prior to discharge or change in level of care    Document interventions on Skin Risk/Oscar flowsheet groups and corresponding LDA    1.  Assess and monitor skin integrity, appearance and/or temperature  2.  Assess risk factors for impaired skin integrity and/or pressures ulcers  3.  Implement precautions to protect skin integrity in collaboration with interdisciplinary team  4.  Implement pressure ulcer prevention protocol if at risk for skin breakdown  5.  Confirm wound care consult if at risk for skin breakdown  6.  Ensure patient use of pressure relieving devices  (Low air loss bed, waffle overlay, heel protectors, ROHO cushion, etc)  Outcome: Not Progressing     Problem: Pain - Standard  Goal: Alleviation of pain or a reduction in pain to the patient’s comfort goal  Description: Target End Date:  Prior to discharge or change in level of care    Document on Vitals flowsheet    1.  Document pain using the appropriate pain scale  per order or unit policy  2.  Educate and implement non-pharmacologic comfort measures (i.e. relaxation, distraction, massage, cold/heat therapy, etc.)  3.  Pain management medications as ordered  4.  Reassess pain after pain med administration per policy  5.  If opiods administered assess patient's response to pain medication is appropriate per POSS sedation scale  6.  Follow pain management plan developed in collaboration with patient and interdisciplinary team (including palliative care or pain specialists if applicable)  Outcome: Progressing       Patient is not progressing towards the following goals:      Problem: Skin Integrity  Goal: Skin integrity is maintained or improved  Description: Target End Date:  Prior to discharge or change in level of care    Document interventions on Skin Risk/Oscar flowsheet groups and corresponding LDA    1.  Assess and monitor skin integrity, appearance and/or temperature  2.  Assess risk factors for impaired skin integrity and/or pressures ulcers  3.  Implement precautions to protect skin integrity in collaboration with interdisciplinary team  4.  Implement pressure ulcer prevention protocol if at risk for skin breakdown  5.  Confirm wound care consult if at risk for skin breakdown  6.  Ensure patient use of pressure relieving devices  (Low air loss bed, waffle overlay, heel protectors, ROHO cushion, etc)  Outcome: Not Progressing

## 2024-07-24 NOTE — DISCHARGE PLANNING
Care Transition Team Assessment    Patient is 59-year-old male admitted for hypotension. Patient is alert and oriented x4. Please see pt's H&P for prior medical history. Patient lives alone in a one story home with five steps to enter. PCP is Orin Darnell. Patient reports, pt reports he believes he requires assist with most things; reports he cannot get up and down stairs, shower, cooking, and cleaning. Patient has limited use of hands at this time and notes he wants to get stronger. Patient confirmed medical insurance is [employer] Per pt, reports has been to [facility] and [facility] in the past; was previously on service with [facility], stated that his doctor has not approved more than one visit as of yet. Pt has a walker, cane, wheelchair, and shower chair for home use; reports mostly using wheelchair. Patient reports a lack of transport reliability and food source. Per pt, his son and sister are good contacts and advocates but live in [place]. Per pt, can share information with sister. Per pt, he has been having trouble showering since sx in June 2024; fear of falling or messing up what surgery has fixed. Pt has set himself up with Meals on Wheels. Per pt, he obtains $2,300/mo from half-way. Per pt, after paying towards groceries, utilities, and space rental he has about $200/mo to self. SW referred pt to [facility] for medicaid eligibility.    Information Source  Orientation Level: Oriented X4, Oriented to place, Oriented to time, Oriented to situation, Oriented to person  Information Given By: Patient, Relative  Informant's Name: Monet  Who is responsible for making decisions for patient? : Patient    Readmission Evaluation  Is this a readmission?: No    Elopement Risk  Legal Hold: No  Ambulatory or Self Mobile in Wheelchair: No-Not an Elopement Risk    Interdisciplinary Discharge Planning  Primary Care Physician: Orin Darnell  Lives with - Patient's Self Care Capacity: Alone and Able to Care For  Self  Patient or legal guardian wants to designate a caregiver: No  Support Systems: None  Housing / Facility: [address]    Discharge Preparedness  What is your plan after discharge?: Uncertain - pending medical team collaboration  What are your discharge supports?: Other (comment) (Family lives in [place])  Prior Functional Level: Ambulatory, Uses Wheelchair, Uses Walker, Uses Cane, Needs Assist with Activities of Daily Living, Independent with Medication Management, Other (Comments) (has shower chair)    Functional Assesment  Prior Functional Level: Ambulatory, Uses Wheelchair, Uses Walker, Uses Cane, Needs Assist with Activities of Daily Living, Independent with Medication Management, Other (Comments) (has shower chair)    Vision / Hearing Impairment  Vision Impairment : No  Hearing Impairment : No    Domestic Abuse  Have you ever been the victim of abuse or violence?: No  Possible Abuse/Neglect Reported to:: Not Applicable    Anticipated Discharge Information  Discharge Disposition: D/T to home under HHA care in anticipation of covered skilled care (06)

## 2024-07-25 PROBLEM — J96.01 ACUTE RESPIRATORY FAILURE WITH HYPOXIA (HCC): Status: ACTIVE | Noted: 2024-07-25

## 2024-07-25 LAB
BACTERIA UR CULT: NORMAL
GLUCOSE BLD STRIP.AUTO-MCNC: 101 MG/DL (ref 65–99)
GLUCOSE BLD STRIP.AUTO-MCNC: 215 MG/DL (ref 65–99)
GLUCOSE BLD STRIP.AUTO-MCNC: 233 MG/DL (ref 65–99)
GLUCOSE BLD STRIP.AUTO-MCNC: 417 MG/DL (ref 65–99)
SIGNIFICANT IND 70042: NORMAL
SITE SITE: NORMAL
SOURCE SOURCE: NORMAL

## 2024-07-25 PROCEDURE — 97535 SELF CARE MNGMENT TRAINING: CPT

## 2024-07-25 PROCEDURE — 96376 TX/PRO/DX INJ SAME DRUG ADON: CPT

## 2024-07-25 PROCEDURE — 700102 HCHG RX REV CODE 250 W/ 637 OVERRIDE(OP): Performed by: STUDENT IN AN ORGANIZED HEALTH CARE EDUCATION/TRAINING PROGRAM

## 2024-07-25 PROCEDURE — 700111 HCHG RX REV CODE 636 W/ 250 OVERRIDE (IP): Mod: JZ | Performed by: STUDENT IN AN ORGANIZED HEALTH CARE EDUCATION/TRAINING PROGRAM

## 2024-07-25 PROCEDURE — A9270 NON-COVERED ITEM OR SERVICE: HCPCS | Performed by: STUDENT IN AN ORGANIZED HEALTH CARE EDUCATION/TRAINING PROGRAM

## 2024-07-25 PROCEDURE — 96372 THER/PROPH/DIAG INJ SC/IM: CPT

## 2024-07-25 PROCEDURE — 99232 SBSQ HOSP IP/OBS MODERATE 35: CPT | Performed by: INTERNAL MEDICINE

## 2024-07-25 PROCEDURE — 700102 HCHG RX REV CODE 250 W/ 637 OVERRIDE(OP): Performed by: INTERNAL MEDICINE

## 2024-07-25 PROCEDURE — 82962 GLUCOSE BLOOD TEST: CPT | Mod: 91

## 2024-07-25 PROCEDURE — 770001 HCHG ROOM/CARE - MED/SURG/GYN PRIV*

## 2024-07-25 PROCEDURE — 700111 HCHG RX REV CODE 636 W/ 250 OVERRIDE (IP): Mod: JZ | Performed by: INTERNAL MEDICINE

## 2024-07-25 PROCEDURE — A9270 NON-COVERED ITEM OR SERVICE: HCPCS | Performed by: INTERNAL MEDICINE

## 2024-07-25 PROCEDURE — 97162 PT EVAL MOD COMPLEX 30 MIN: CPT

## 2024-07-25 RX ORDER — INSULIN LISPRO 100 [IU]/ML
0.2 INJECTION, SOLUTION INTRAVENOUS; SUBCUTANEOUS
Status: DISCONTINUED | OUTPATIENT
Start: 2024-07-25 | End: 2024-07-25

## 2024-07-25 RX ORDER — INSULIN LISPRO 100 [IU]/ML
2-9 INJECTION, SOLUTION INTRAVENOUS; SUBCUTANEOUS
Status: DISCONTINUED | OUTPATIENT
Start: 2024-07-25 | End: 2024-07-26 | Stop reason: HOSPADM

## 2024-07-25 RX ORDER — DEXTROSE MONOHYDRATE 25 G/50ML
25 INJECTION, SOLUTION INTRAVENOUS
Status: DISCONTINUED | OUTPATIENT
Start: 2024-07-25 | End: 2024-07-26 | Stop reason: HOSPADM

## 2024-07-25 RX ORDER — INSULIN LISPRO 100 [IU]/ML
2-9 INJECTION, SOLUTION INTRAVENOUS; SUBCUTANEOUS
Status: DISCONTINUED | OUTPATIENT
Start: 2024-07-25 | End: 2024-07-25

## 2024-07-25 RX ORDER — DEXTROSE MONOHYDRATE 25 G/50ML
25 INJECTION, SOLUTION INTRAVENOUS
Status: DISCONTINUED | OUTPATIENT
Start: 2024-07-25 | End: 2024-07-25

## 2024-07-25 RX ORDER — METHOCARBAMOL 500 MG/1
500 TABLET, FILM COATED ORAL 3 TIMES DAILY
Status: DISCONTINUED | OUTPATIENT
Start: 2024-07-25 | End: 2024-07-26 | Stop reason: HOSPADM

## 2024-07-25 RX ADMIN — METHOCARBAMOL 500 MG: 500 TABLET ORAL at 17:20

## 2024-07-25 RX ADMIN — INSULIN LISPRO 5 UNITS: 100 INJECTION, SOLUTION INTRAVENOUS; SUBCUTANEOUS at 11:23

## 2024-07-25 RX ADMIN — INSULIN LISPRO 9 UNITS: 100 INJECTION, SOLUTION INTRAVENOUS; SUBCUTANEOUS at 06:22

## 2024-07-25 RX ADMIN — INSULIN LISPRO 5 UNITS: 100 INJECTION, SOLUTION INTRAVENOUS; SUBCUTANEOUS at 06:23

## 2024-07-25 RX ADMIN — INSULIN LISPRO 3 UNITS: 100 INJECTION, SOLUTION INTRAVENOUS; SUBCUTANEOUS at 20:02

## 2024-07-25 RX ADMIN — FUROSEMIDE 40 MG: 10 INJECTION INTRAMUSCULAR; INTRAVENOUS at 06:15

## 2024-07-25 RX ADMIN — ASPIRIN 81 MG: 81 TABLET, COATED ORAL at 17:20

## 2024-07-25 RX ADMIN — ENOXAPARIN SODIUM 40 MG: 100 INJECTION SUBCUTANEOUS at 17:21

## 2024-07-25 RX ADMIN — INSULIN LISPRO 3 UNITS: 100 INJECTION, SOLUTION INTRAVENOUS; SUBCUTANEOUS at 17:23

## 2024-07-25 RX ADMIN — OXYCODONE 5 MG: 5 TABLET ORAL at 06:15

## 2024-07-25 RX ADMIN — OXYCODONE 5 MG: 5 TABLET ORAL at 17:20

## 2024-07-25 RX ADMIN — METHOCARBAMOL 500 MG: 500 TABLET ORAL at 11:17

## 2024-07-25 ASSESSMENT — ENCOUNTER SYMPTOMS
PALPITATIONS: 0
DOUBLE VISION: 0
SHORTNESS OF BREATH: 0
HALLUCINATIONS: 0
VOMITING: 0
CHILLS: 0
FEVER: 0
BACK PAIN: 0
BLURRED VISION: 0
SEIZURES: 0
COUGH: 0
HEADACHES: 0
FALLS: 0
ABDOMINAL PAIN: 0
NAUSEA: 0
SORE THROAT: 0

## 2024-07-25 ASSESSMENT — GAIT ASSESSMENTS
DISTANCE (FEET): 5
DEVIATION: BRADYKINETIC
ASSISTIVE DEVICE: FRONT WHEEL WALKER
GAIT LEVEL OF ASSIST: CONTACT GUARD ASSIST

## 2024-07-25 ASSESSMENT — ACTIVITIES OF DAILY LIVING (ADL): TOILETING: INDEPENDENT

## 2024-07-25 ASSESSMENT — PATIENT HEALTH QUESTIONNAIRE - PHQ9
SUM OF ALL RESPONSES TO PHQ9 QUESTIONS 1 AND 2: 0
1. LITTLE INTEREST OR PLEASURE IN DOING THINGS: NOT AT ALL
2. FEELING DOWN, DEPRESSED, IRRITABLE, OR HOPELESS: NOT AT ALL

## 2024-07-25 ASSESSMENT — COGNITIVE AND FUNCTIONAL STATUS - GENERAL
SUGGESTED CMS G CODE MODIFIER MOBILITY: CK
MOBILITY SCORE: 18
WALKING IN HOSPITAL ROOM: A LITTLE
TURNING FROM BACK TO SIDE WHILE IN FLAT BAD: A LITTLE
MOVING FROM LYING ON BACK TO SITTING ON SIDE OF FLAT BED: A LITTLE
STANDING UP FROM CHAIR USING ARMS: A LITTLE
MOVING TO AND FROM BED TO CHAIR: A LITTLE
CLIMB 3 TO 5 STEPS WITH RAILING: A LITTLE

## 2024-07-25 ASSESSMENT — LIFESTYLE VARIABLES: SUBSTANCE_ABUSE: 0

## 2024-07-25 ASSESSMENT — PAIN DESCRIPTION - PAIN TYPE: TYPE: ACUTE PAIN

## 2024-07-25 ASSESSMENT — FIBROSIS 4 INDEX: FIB4 SCORE: .7856742013183861381

## 2024-07-25 NOTE — THERAPY
Occupational Therapy Contact Note    Patient Name: Christoph Taylor  Age:  59 y.o., Sex:  male  Medical Record #: [id number]  Today's Date: 7/25/2024    Attempted evaluation, upon mobilizing to EOB, pt reporting significant dizziness, BP 93/57, deferred further mobility, RN aware. Will re attempt as able.      07/25/24 1035   Prior Living Situation   Housing / Facility 1 Story House   Bathroom Set up Walk In Shower;Shower Chair   Equipment Owned 4-Wheel Walker;Tub / Shower Seat;Wheelchair   Lives with - Patient's Self Care Capacity Alone and Able to Care For Self   Comments Has only been home 2 day since june, was previously at SNF, has had increased difficulty with ADLs and multiple falls   Prior Level of ADL Function   Self Feeding Independent   Grooming / Hygiene Independent   Bathing Independent   Dressing Independent   Toileting Independent   History of Falls   History of Falls Yes   Date of Last Fall   (3 in the last few months)   Precautions   Precautions Fall Risk;Weight Bearing As Tolerated Right Lower Extremity   Comments offloading shoe RLE, AFO LLE   Vitals   Blood Pressure 93/57   Vitals Comments dizzy at EOB   Education Group   Additional Comments ed on wear/care of offloading shoe   Interdisciplinary Plan of Care Collaboration   Collaboration Comments Defered 2/2 low BP, will re attempt as able

## 2024-07-25 NOTE — PROGRESS NOTES
Hospital Medicine Daily Progress Note    Date of Service  7/25/2024    Chief Complaint  Christoph Taylor is a 59 y.o. male admitted 7/22/2024 with shortness of breath    Hospital Course  No notes on file    Interval Problem Update  Patient was seen and examined at bedside.  No acute events overnight. Patient is resting comfortably in bed and in no acute distress.     Echo reviewed  Room air  Foot wound noted  Insulin regimen adjusted again - patient reports home regimen of Lantus BID    I have discussed this patient's plan of care and discharge plan at IDT rounds today with Case Management, Nursing, Nursing leadership, and other members of the IDT team.    Code Status  Full Code    Disposition  The patient is not medically cleared for discharge to home or a post-acute facility.      I have placed the appropriate orders for post-discharge needs.    Review of Systems  Review of Systems   Constitutional:  Negative for chills and fever.   HENT:  Negative for congestion and sore throat.    Eyes:  Negative for blurred vision and double vision.   Respiratory:  Negative for cough and shortness of breath.    Cardiovascular:  Negative for chest pain and palpitations.   Gastrointestinal:  Negative for abdominal pain, nausea and vomiting.   Genitourinary:  Negative for dysuria and frequency.   Musculoskeletal:  Negative for back pain and falls.   Skin:  Negative for rash.   Neurological:  Negative for seizures and headaches.   Psychiatric/Behavioral:  Negative for hallucinations and substance abuse.         Physical Exam  Temp:  [36.4 °C (97.5 °F)-36.9 °C (98.4 °F)] 36.6 °C (97.9 °F)  Pulse:  [70-79] 70  Resp:  [12-18] 18  BP: ()/(45-76) 109/74  SpO2:  [90 %-98 %] 94 %    Physical Exam  Vitals and nursing note reviewed.   Constitutional:       General: He is not in acute distress.     Appearance: He is not toxic-appearing.   HENT:      Right Ear: External ear normal.      Left Ear: External ear normal.      Nose: No  congestion.      Mouth/Throat:      Pharynx: No oropharyngeal exudate.   Eyes:      General: No scleral icterus.  Cardiovascular:      Heart sounds: No murmur heard.  Pulmonary:      Breath sounds: No wheezing.   Abdominal:      Tenderness: There is no abdominal tenderness. There is no guarding or rebound.   Musculoskeletal:         General: No swelling or deformity.   Skin:     Coloration: Skin is not jaundiced.      Findings: No bruising.   Neurological:      General: No focal deficit present.      Mental Status: He is alert and oriented to person, place, and time.      Motor: No weakness.   Psychiatric:         Mood and Affect: Mood normal.         Behavior: Behavior normal.         Fluids    Intake/Output Summary (Last 24 hours) at 7/25/2024 1441  Last data filed at 7/25/2024 0807  Gross per 24 hour   Intake 300 ml   Output 400 ml   Net -100 ml       Laboratory  Recent Labs     07/22/24  1820 07/24/24  0233   WBC 7.8 5.5   RBC 4.23* 3.92*   HEMOGLOBIN 11.6* 10.8*   HEMATOCRIT 34.8* 32.1*   MCV 82.3 81.9   MCH 27.4 27.6   MCHC 33.3 33.6   RDW 38.5 38.9   PLATELETCT 279 243   MPV 9.6 9.2     Recent Labs     07/22/24  1820 07/24/24  0233   SODIUM 135 134*   POTASSIUM 4.1 3.6   CHLORIDE 90* 91*   CO2 29 32   GLUCOSE 433* 357*   BUN 19 15   CREATININE 0.87 0.66   CALCIUM 8.9 9.2                   Imaging  DX-FOOT-COMPLETE 3+ RIGHT   Final Result      1.  No evidence of fracture or bony destructive change.      2.  Multifocal osteoarthritis.      EC-ECHOCARDIOGRAM COMPLETE W/O CONT   Final Result      CT-CTA CHEST PULMONARY ARTERY W/ RECONS   Final Result         1.  No pulmonary embolus appreciated.      DX-CHEST-PORTABLE (1 VIEW)   Final Result      No acute cardiopulmonary abnormality identified.           Assessment/Plan  * Acute hypoxic respiratory failure (HCC)- (present on admission)  Assessment & Plan  Unclear etiology  CXR and CTA chest unrevealing  Supportive care, IS and wean O2 as tolerated  SpO2 in the  70's on presentation    Elevated BNP  Worsened shortness of breath when laying flat  IV lasix daily - with blood pressure in the 70's today requirign frequent and close monitoring by staff  Echo reviewed - normal EF  Now on room air    Open wound of foot  Assessment & Plan  No surrounding erythema or purulent discharge  Monitor at this time  Xray reviewed  Wound team consult    Uncontrolled type 2 diabetes mellitus with hyperglycemia (HCC)- (present on admission)  Assessment & Plan  A1C 12.4  Lantus  BID - per patients home regimen  Insulin sliding scale    Generalized weakness- (present on admission)  Assessment & Plan  And frequent falls  PT/OT eval          VTE prophylaxis:    enoxaparin ppx      I have performed a physical exam and reviewed and updated ROS and Plan today (7/25/2024). In review of yesterday's note (7/24/2024), there are no changes except as documented above.      Greater than 46 minutes spent prepping to see patient (e.g. review of tests) obtaining and/or reviewing separately obtained history. Performing a medically appropriate examination and/ evaluation.  Counseling and educating the patient/family/caregiver.  Ordering medications, tests, or procedures.  Referring and communicating with other health care professionals.  Documenting clinical information in EPIC.  Independently interpreting results and communicating results to patient/family/caregiver.  Care coordination.

## 2024-07-25 NOTE — WOUND TEAM
[facility]  Inpatient Services  Initial Wound and Skin Care Evaluation    Admission Date: 7/22/2024     Last order of IP CONSULT TO WOUND CARE was found on 7/23/2024 from Hospital Encounter on 7/22/2024     HPI, PMH, SH: Reviewed    Past Surgical History:   Procedure Laterality Date    FUSION, SPINE, LUMBAR, PLIF N/A 6/7/2024    Procedure: FUSION, SPINE, LUMBAR, PLIF- REDO LUMBAR 4-5 LAMINECTOMY WITH L4-5 STEALTH FUSION;  Surgeon: Moo Pulido III, M.D.;  Location: SURGERY [facility];  Service: Neurosurgery    LUMBAR LAMINECTOMY DISKECTOMY N/A 6/7/2024    Procedure: LAMINECTOMY, SPINE, LUMBAR, WITH DISCECTOMY;  Surgeon: Moo Pulido III, M.D.;  Location: [facility];  Service: Neurosurgery    CERVICAL DISK AND FUSION ANTERIOR N/A 6/4/2024    Procedure: C4-C7 ANTERIOR CERVICAL DISC AND FUSION;  Surgeon: Moo Pulido III, M.D.;  Location: [facility];  Service: Neurosurgery    FINGER OR HAND INCISION AND DRAINAGE Right 6/12/2023    Procedure: INCISION AND DRAINAGE, HAND - THUMB;  Surgeon: Ace Shaw M.D.;  Location: SURGERY [facility];  Service: Orthopedics    VT UPPER GI ENDOSCOPY,DIAGNOSIS N/A 3/14/2023    Procedure: GASTROSCOPY;  Surgeon: Atilio Freed M.D.;  Location: [facility];  Service: Gastroenterology    OTHER      appy, lumbar fusion     Social History     Tobacco Use    Smoking status: Former    Smokeless tobacco: Never   Substance Use Topics    Alcohol use: Not Currently     Chief Complaint   Patient presents with    Hypotension     Pt seen yesterday for a GLF and reports not feeling well since discharge. Pt unable to make follow up PCP appointment today so he came to the ED. Hypotensive in the 70s in triage.      Diagnosis: Acute hypoxic respiratory failure (HCC) [J96.01]    Unit where seen by Wound Team: [id number]/01     WOUND CONSULT RELATED TO:  R plantar foot    WOUND TEAM PLAN OF CARE - Frequency of Follow-up:   Nursing to follow dressing orders  written for wound care. Contact wound team if area fails to progress, deteriorates or with any questions/concerns if something comes up before next scheduled follow up (See below as to whether wound is following and frequency of wound follow up)   Not following, consult as needed  - R plantar foot    WOUND HISTORY:   59 y.o. male admitted with SOB.  Patient states he had recent cervical and lumbar surgeries a little over a month ago, has been having some weakness related to recovering from the surgeries. He is currently unable to drive related to this. Patient does endorse neuropathy. He wears a specialized AFO shoe on his L foot to support his collapsed arch.       WOUND ASSESSMENT/LDA  Wound 07/22/24 Partial Thickness Wound Foot Right (Active)   Date First Assessed/Time First Assessed: 07/22/24 2100   Present on Original Admission: Yes  Hand Hygiene Completed: Yes  Primary Wound Type: Partial Thickness Wound  Location: Foot  Laterality: Right      Assessments 7/24/2024  4:30 PM   Wound Image     Site Assessment Red   Periwound Assessment Dry;Intact   Margins Defined edges;Attached edges   Closure Secondary intention   Drainage Amount Scant   Drainage Description Serosanguineous   Treatments Cleansed   Wound Cleansing Approved Wound Cleanser   Periwound Protectant Skin Protectant Wipes to Periwound   Dressing Status Clean;Dry;Intact   Dressing Changed Changed   Dressing Cleansing/Solutions Not Applicable   Dressing Options Hydrofera Blue Ready;Offloading Dressing - Heel   Dressing Change/Treatment Frequency Every 72 hrs, and As Needed   NEXT Dressing Change/Treatment Date 07/27/24   NEXT Weekly Photo (Inpatient Only) 07/31/24   Wound Team Following Not following   Non-staged Wound Description Partial thickness   Wound Length (cm) 5 cm   Wound Width (cm) 4 cm   Wound Depth (cm) 0.1 cm   Wound Surface Area (cm^2) 20 cm^2   Wound Volume (cm^3) 2 cm^3   Shape Dalton   Wound Odor None        Vascular:    MARCO A:   No  results found.    Lab Values:    Lab Results   Component Value Date/Time    WBC 5.5 07/24/2024 02:33 AM    RBC 3.92 (L) 07/24/2024 02:33 AM    HEMOGLOBIN 10.8 (L) 07/24/2024 02:33 AM    HEMATOCRIT 32.1 (L) 07/24/2024 02:33 AM    CREACTPROT 1.44 (H) 07/24/2024 02:33 AM    SEDRATEWES 43 (H) 07/24/2024 02:33 AM    HBA1C 12.4 (H) 05/27/2024 07:50 PM         Culture Results show:  No results found for this or any previous visit (from the past 720 hour(s)).    Pain Level/Medicated:  None, Tolerated without pain medication       INTERVENTIONS BY WOUND TEAM:  Chart and images reviewed. Discussed with bedside RN. All areas of concern (based on picture review, LDA review and discussion with bedside RN) have been thoroughly assessed. Documentation of areas based on significant findings. This RN in to assess patient. Performed standard wound care which includes appropriate positioning, dressing removal and non-selective debridement. Pictures and measurements obtained weekly if/when required.    Wound:  R Plantar foot  Preparation for Dressing removal: Removed without difficulty  Cleansed/Non-selectively Debrided with:  Wound cleanser and Gauze  Jeni wound: Cleansed with Wound cleanser and Gauze, Prepped with No Sting  Primary Dressing:  Hydrofera blue  Secondary (Outer) Dressing: Offloading adhesive foam    Advanced Wound Care Discharge Planning  Number of Clinicians necessary to complete wound care: 1  Is patient requiring IV pain medications for dressing changes:  No   Length of time for dressing change 10 min. (This does not include chart review, pre-medication time, set up, clean up or time spent charting.)    Interdisciplinary consultation: Patient, Bedside RN, Анна GANT (Wound RN).    EVALUATION / RATIONALE FOR TREATMENT:     Date:  07/24/24  Wound Status:  Initial evaluation    R plantar foot with healing partial thickness wound of unknown etiology, possible a previous blister from friction and ill-fitting  footwear?  Hydrofera Blue applied for the hydrophilic polyurethane foam which contains ethylene oxide used as a bactericidal, fungicidal, and sporicidal disinfectant. Hydrofera Blue also aids in maintaining a moist wound environment. The absorption properties of this dressing are important in collecting exudates and bacteria from the injured area.  Offloading shoe ordered for patient to wear when ambulating.  Recommend outpatient follow-up for discharge, referral to [facility]. Patient currently unable to drive,  may be a better option for follow-up care.           Goals: Steady decrease in wound area and depth weekly.    NURSING PLAN OF CARE ORDERS:  Dressing changes: See Dressing Care orders    NUTRITION RECOMMENDATIONS   Wound Team Recommendations:  N/A    DIET ORDERS (From admission to next 24h)       Start     Ordered    07/22/24 2200  Diet Order Diet: Consistent CHO (Diabetic)  ALL MEALS        Question:  Diet:  Answer:  Consistent CHO (Diabetic)    07/22/24 2203                    PREVENTATIVE INTERVENTIONS:    Q shift Oscar - performed per nursing policy  Q shift pressure point assessments - performed per nursing policy    Surface/Positioning  Standard/trauma mattress - Currently in Place    Anticipated discharge plans:  Home Health Care and Outpatient Wound Center        Vac Discharge Needs:  Vac Discharge plan is purely a recommendation from wound team and not a requirement for discharge unless otherwise stated by physician.  Not Applicable Pt not on a wound vac

## 2024-07-25 NOTE — PROGRESS NOTES
Monitor Summary:     Rhythm: SR  Rate: 64-77  Ectopy: (R)PVC  Measurements: 0.17/0.06/0.41           12 Hour Chart Check  - picture unavailable

## 2024-07-25 NOTE — THERAPY
"Physical Therapy   Initial Evaluation     Patient Name: Christpoh Taylor  Age:  59 y.o., Sex:  male  Medical Record #: [id number]  Today's Date: 7/25/2024     Precautions  Precautions: (P) Fall Risk;Weight Bearing As Tolerated Right Lower Extremity (with offloading shoe)  Comments: (P) L AFO    Assessment    59 y.o. male was admitted to the hospital s/p fall for respiratory failure.  PMHx includes DM, GERD, LBP, cauda equina s/p C4-C7 ACDF on 6/7.  He lives alone in a 1SH with 5 EVELIN and was mod I at w/c level since his dc from SNF on 7/4.  However pt reports 3 falls since his discharge.  On eval he presents with hypotension, decreased balance, and decreased activity tolerance impairing his mobility.  He will benefit from continued acute PT services to address the above deficits in order to return home safely.  Recommend post acute PT services.    Plan    Physical Therapy Initial Treatment Plan   Treatment Plan : (P) Bed Mobility, Equipment, Family / Caregiver Training, Gait Training, Manual Therapy, Neuro Re-Education / Balance, Self Care / Home Evaluation, Stair Training, Therapeutic Activities, Therapeutic Exercise  Treatment Frequency: (P) 4 Times per Week  Duration: (P) Until Therapy Goals Met    DC Equipment Recommendations: (P) None (Pt has a w/c and rollator)  Discharge Recommendations: (P) Recommend post-acute placement for additional physical therapy services prior to discharge home       Subjective    \"I have difficulty making my meals.  I haven't showered.\"- pt reports his microwave is above his stove and he has to stand up to use it.  He cannot reach it from a w/c level.     Objective       07/25/24 0855   Initial Contact Note    Initial Contact Note Order Received and Verified, Physical Therapy Evaluation in Progress with Full Report to Follow.   Precautions   Precautions Fall Risk;Weight Bearing As Tolerated Right Lower Extremity  (with offloading shoe)   Comments L AFO   Vitals   Pulse 79   Blood " Pressure (!) 83/54  (/p ambulation)   Room Air Oximetry 98   O2 Delivery Device None - Room Air   Vitals Comments BP increased to 98/61 once he laid back down, dizziness went away   Pain 0 - 10 Group   Therapist Pain Assessment Post Activity Pain Same as Prior to Activity  (no complaints of pain)   Prior Living Situation   Prior Services   (Pt reports hH was ordered uppon dc from [facility], but he hadn't started xu before he was readmitted to the hospital)   Housing / Facility 1 Story House   Steps Into Home 5   Rail Both Rail (Steps into Home)   Equipment Owned 4-Wheel Walker;Wheelchair;Tub / Shower Seat;Adjustable Bed Without Rails   Lives with - Patient's Self Care Capacity Alone and Able to Care For Self   Prior Level of Functional Mobility   Bed Mobility Independent   Transfer Status Independent   Ambulation Independent   Ambulation Distance short household   Assistive Devices Used 4-Wheel Walker   Wheelchair Independent   Stairs Independent   Comments Pt reports he mostly uses his w/c at home because he feels off balance and has been falling.  Prior to his neck surgery in June, he was mod I for mobility with a walker   History of Falls   History of Falls Yes  (Pt reports 3 falls in the past 2 months since his dc from [facility])   Date of Last Fall   (reason for admission)   Cognition    Cognition / Consciousness WDL   Level of Consciousness Alert   Active ROM Lower Body    Active ROM Lower Body  WDL   Strength Lower Body   Lower Body Strength  WDL   Sensation Lower Body   Lower Extremity Sensation   X   Rt Lower Extremity Light Touch Impaired   Rt Lower Extremity Deep Pressure Sensation Impaired   Lt Lower Extremity Light Touch Impaired   Lt Lower Extremity Deep Pressure Sensation Impaired   Comments L > R   Balance Assessment   Sitting Balance (Static) Fair   Sitting Balance (Dynamic) Fair -   Standing Balance (Static) Fair -   Standing Balance (Dynamic) Fair -   Weight Shift Sitting Fair   Weight Shift Standing Fair    Bed Mobility    Supine to Sit Standby Assist   Sit to Supine Standby Assist   Scooting Standby Assist   Rolling Standby Assist   Comments HOB elevated, rail   Gait Analysis   Gait Level Of Assist Contact Guard Assist   Assistive Device Front Wheel Walker   Distance (Feet) 5   # of Times Distance was Traveled 1   Deviation Bradykinetic  (c/o dizziness)   Weight Bearing Status FWB BLEs   Functional Mobility   Sit to Stand Contact Guard Assist   Bed, Chair, Wheelchair Transfer Contact Guard Assist   Transfer Method Stand Step   Mobility with FWW   Edema / Skin Assessment   Edema / Skin  Not Assessed   Comments dressing intact R foot   Short Term Goals    Short Term Goal # 1 Pt will perform supine < > sit independently with bed flat, no rail in 6 visits in order to set up for upright mobility   Short Term Goal # 2 Pt will perform sit < > stand indepednently in 6 visits in order to prepare for ambulation   Short Term Goal # 3 Pt will ambulate 50' SBA with FWW in 6 visits in order to progress household ambulation   Short Term Goal # 4 Pt will ascend/ descend 5 steps SBA with B rails in 6 visits in order to access his home.   Education Group   Education Provided Role of Physical Therapist;Gait Training   Role of Physical Therapist Patient Response Patient;Acceptance;Explanation;Action Demonstration   Gait Training Patient Response Patient;Acceptance;Explanation;Action Demonstration;Reinforcement Needed   Physical Therapy Initial Treatment Plan    Treatment Plan  Bed Mobility;Equipment;Family / Caregiver Training;Gait Training;Manual Therapy;Neuro Re-Education / Balance;Self Care / Home Evaluation;Stair Training;Therapeutic Activities;Therapeutic Exercise   Treatment Frequency 4 Times per Week   Duration Until Therapy Goals Met   Problem List    Problems Impaired Bed Mobility;Impaired Transfers;Impaired Ambulation;Impaired Balance;Decreased Activity Tolerance   Anticipated Discharge Equipment and Recommendations   DC  Equipment Recommendations None  (Pt has a w/c and rollator)   Discharge Recommendations Recommend post-acute placement for additional physical therapy services prior to discharge home   Interdisciplinary Plan of Care Collaboration   IDT Collaboration with  Nursing;Occupational Therapist   Patient Position at End of Therapy In Bed;Call Light within Reach;Tray Table within Reach   Collaboration Comments PT recommendations   Session Information   Date / Session Number  7/25 -1 (1/4. 7/25)

## 2024-07-25 NOTE — PROGRESS NOTES
Right XL offloading shoe fit to pt. Pegs removed near area of concern and left at pt's bedside.     Contact traction for any questions or concerns.

## 2024-07-25 NOTE — DISCHARGE PLANNING
MYRON received a call from Pema with [facility]. Per Pema, insurance authorization has been approved. Pending medical clearance.     @ 1445: Discussed pt during afternoon rounds. Anticipate dc tomorrow.     @ 1505: MYRON called Pema with [facility] regarding bed availability and transport arrangements. Per Pema, [facility] can  by wheelchair at 1330.

## 2024-07-25 NOTE — CARE PLAN
The patient is Stable - Low risk of patient condition declining or worsening    Shift Goals  Clinical Goals: pain control  Patient Goals: discharge  Family Goals: KATHERINE    Progress made toward(s) clinical / shift goals:  Patient had mild complaint of neck pain but pain subsided with repositioning.       Problem: Knowledge Deficit - Standard  Goal: Patient and family/care givers will demonstrate understanding of plan of care, disease process/condition, diagnostic tests and medications  Outcome: Progressing     Problem: Skin Integrity  Goal: Skin integrity is maintained or improved  Outcome: Progressing     Problem: Fall Risk  Goal: Patient will remain free from falls  Outcome: Progressing     Problem: Pain - Standard  Goal: Alleviation of pain or a reduction in pain to the patient’s comfort goal  Outcome: Progressing

## 2024-07-25 NOTE — PROGRESS NOTES
Report received from night shift RN, assumed patient care. Patient is calmly resting in bed, no signs of distress, even and unlabored breathing noted. Pt on room air. A&Ox4. Tele box on and in place. Patient has call light within reach, fall precautions in place. Patient states no needs at this time. Hourly rounding initiated.

## 2024-07-26 VITALS
WEIGHT: 156.75 LBS | HEIGHT: 72 IN | SYSTOLIC BLOOD PRESSURE: 125 MMHG | TEMPERATURE: 97.9 F | RESPIRATION RATE: 16 BRPM | DIASTOLIC BLOOD PRESSURE: 77 MMHG | HEART RATE: 74 BPM | OXYGEN SATURATION: 92 % | BODY MASS INDEX: 21.23 KG/M2

## 2024-07-26 LAB
GLUCOSE BLD STRIP.AUTO-MCNC: 186 MG/DL (ref 65–99)
GLUCOSE BLD STRIP.AUTO-MCNC: 190 MG/DL (ref 65–99)
GLUCOSE BLD STRIP.AUTO-MCNC: 73 MG/DL (ref 65–99)

## 2024-07-26 PROCEDURE — 99239 HOSP IP/OBS DSCHRG MGMT >30: CPT | Performed by: INTERNAL MEDICINE

## 2024-07-26 PROCEDURE — A9270 NON-COVERED ITEM OR SERVICE: HCPCS | Performed by: STUDENT IN AN ORGANIZED HEALTH CARE EDUCATION/TRAINING PROGRAM

## 2024-07-26 PROCEDURE — 700102 HCHG RX REV CODE 250 W/ 637 OVERRIDE(OP): Performed by: INTERNAL MEDICINE

## 2024-07-26 PROCEDURE — A9270 NON-COVERED ITEM OR SERVICE: HCPCS | Performed by: INTERNAL MEDICINE

## 2024-07-26 PROCEDURE — 700111 HCHG RX REV CODE 636 W/ 250 OVERRIDE (IP): Mod: JZ | Performed by: INTERNAL MEDICINE

## 2024-07-26 PROCEDURE — 700102 HCHG RX REV CODE 250 W/ 637 OVERRIDE(OP): Performed by: STUDENT IN AN ORGANIZED HEALTH CARE EDUCATION/TRAINING PROGRAM

## 2024-07-26 PROCEDURE — 82962 GLUCOSE BLOOD TEST: CPT

## 2024-07-26 RX ORDER — OXYCODONE HYDROCHLORIDE 5 MG/1
5 TABLET ORAL EVERY 8 HOURS PRN
Qty: 6 TABLET | Refills: 0 | Status: SHIPPED | OUTPATIENT
Start: 2024-07-26 | End: 2024-07-26

## 2024-07-26 RX ORDER — OXYCODONE HYDROCHLORIDE 5 MG/1
5 TABLET ORAL EVERY 8 HOURS PRN
Qty: 6 TABLET | Refills: 0 | Status: SHIPPED | OUTPATIENT
Start: 2024-07-26 | End: 2024-07-28

## 2024-07-26 RX ORDER — LOPERAMIDE HCL 2 MG
2 CAPSULE ORAL 4 TIMES DAILY PRN
Status: DISCONTINUED | OUTPATIENT
Start: 2024-07-26 | End: 2024-07-26 | Stop reason: HOSPADM

## 2024-07-26 RX ADMIN — OXYCODONE 5 MG: 5 TABLET ORAL at 00:59

## 2024-07-26 RX ADMIN — METHOCARBAMOL 500 MG: 500 TABLET ORAL at 04:54

## 2024-07-26 RX ADMIN — INSULIN LISPRO 2 UNITS: 100 INJECTION, SOLUTION INTRAVENOUS; SUBCUTANEOUS at 04:55

## 2024-07-26 RX ADMIN — OXYCODONE HYDROCHLORIDE 10 MG: 10 TABLET ORAL at 07:19

## 2024-07-26 RX ADMIN — FUROSEMIDE 40 MG: 10 INJECTION INTRAMUSCULAR; INTRAVENOUS at 04:54

## 2024-07-26 RX ADMIN — METHOCARBAMOL 500 MG: 500 TABLET ORAL at 12:20

## 2024-07-26 RX ADMIN — INSULIN LISPRO 2 UNITS: 100 INJECTION, SOLUTION INTRAVENOUS; SUBCUTANEOUS at 12:21

## 2024-07-26 RX ADMIN — LOPERAMIDE HYDROCHLORIDE 2 MG: 2 CAPSULE ORAL at 12:20

## 2024-07-26 ASSESSMENT — FIBROSIS 4 INDEX: FIB4 SCORE: .7856742013183861381

## 2024-07-26 ASSESSMENT — PAIN DESCRIPTION - PAIN TYPE
TYPE: ACUTE PAIN

## 2024-07-26 NOTE — PROGRESS NOTES
Discharge instructions reviewed and signed. IV removed. Pt. awaiting transport to [facility], scheduled at 1330.

## 2024-07-26 NOTE — PROGRESS NOTES
Pt left unit with [facility] transport to discharge to [facility]. Printed oxycodone script sent with Pt. Report given to Keyona Huizar at [facility]. All questions answered. All belongings left with pt.

## 2024-07-26 NOTE — DISCHARGE SUMMARY
Discharge Summary    CHIEF COMPLAINT ON ADMISSION  Chief Complaint   Patient presents with    Hypotension     Pt seen yesterday for a GLF and reports not feeling well since discharge. Pt unable to make follow up PCP appointment today so he came to the ED. Hypotensive in the 70s in triage.        Reason for Admission  Fatigue     Admission Date  7/22/2024    CODE STATUS  Full Code    HPI & HOSPITAL COURSE  Christoph Taylor is a 59 y.o. male with DM on insulin, CKD, HLD, GERD, chronic back pain and recent history of cauda equina syndrome s/p C4-C7 ACDF on 6/4 and L4-L5 laminectomies with stealth fusion 6/7 who presented 7/22/2024 with generalized malaise, and fall -  he was evaluated in ED 07/21 - workups unremarkable and DC home. He reported generalized weakness.     Pt denies fever, chills, lightheadedness, vision changes, chest pain, palpitation, SOB, dysphagia, abdominal pain, changes in urinary or bowel habits, arthralgia or leg edema. Pt denies recent travel history or sick contacts.      Upon arrival to ED 07/22, Pt was quite hypotensive 70/35 - responded to IVF. The patient was hypoxic to 70s in room air and placed on 3L NC. Exam was grossly unremarkable. Labs done were with elevated proBNP 1704. Trop 52 > 36, D-dimer 1.05 and mild normocytic anemia. CXR without acute cardiopulmonary abnormalities. EKG done personally reviewed was with NSR and no acute ischemic changes. Patient received IV diuresis and maintained appropriate blood pressures, weaned to room air. A1C of 12, blood sugar was optimized with lantus BID and sliding scale. Patient also noted to have diabetic wound to foot which does not appear infected. Patient improved clinically and was agreeable to discharge. Case management assisted in organizing discharge to skilled nursing facility. Patient was determined satisfactory for discharge with appropriate follow up.    Therefore, he is discharged in fair and stable condition to skilled nursing  facility.    The patient met 2-midnight criteria for an inpatient stay at the time of discharge.    Discharge Date  07/26/2024    FOLLOW UP ITEMS POST DISCHARGE  Please follow up with PCP in 3-5 days for post hospitalization follow up and medication reconciliation.     DISCHARGE DIAGNOSES  Principal Problem:    Acute hypoxic respiratory failure (HCC) (POA: Yes)  Active Problems:    Open wound of foot (POA: Unknown)    Uncontrolled type 2 diabetes mellitus with hyperglycemia (HCC) (POA: Yes)    Generalized weakness (POA: Yes)    Acute respiratory failure with hypoxia (HCC) (POA: Yes)  Resolved Problems:    * No resolved hospital problems. *      FOLLOW UP  No future appointments.  [facility]  [address] [address]  [phone]        DONTE Tariq.R.N.  [address]  [place] [place] [facility] [id number] [phone]    Follow up in 1 week(s)  Please call to schedule a follow up appointment for your most recent hospitalization      MEDICATIONS ON DISCHARGE     Medication List        CHANGE how you take these medications        Instructions   insulin GLARGINE 100 UNIT/ML Soln  What changed:   how much to take  when to take this  Commonly known as: Lantus,Semglee   Inject 15 Units under the skin 2 times a day.  Dose: 15 Units            CONTINUE taking these medications        Instructions   aspirin 81 MG EC tablet   Take 81 mg by mouth every evening.  Dose: 81 mg            STOP taking these medications      oxyCODONE immediate release 10 MG immediate release tablet  Commonly known as: Roxicodone              Allergies  Allergies   Allergen Reactions    Ketamine      aggressive       DIET  Orders Placed This Encounter   Procedures    Diet Order Diet: Consistent CHO (Diabetic)     Standing Status:   Standing     Number of Occurrences:   1     Order Specific Question:   Diet:     Answer:   Consistent CHO (Diabetic) [4]         LABORATORY  Lab Results   Component Value Date     SODIUM 134 (L) 07/24/2024    POTASSIUM 3.6 07/24/2024    CHLORIDE 91 (L) 07/24/2024    CO2 32 07/24/2024    GLUCOSE 357 (H) 07/24/2024    BUN 15 07/24/2024    CREATININE 0.66 07/24/2024        Lab Results   Component Value Date    WBC 5.5 07/24/2024    HEMOGLOBIN 10.8 (L) 07/24/2024    HEMATOCRIT 32.1 (L) 07/24/2024    PLATELETCT 243 07/24/2024        Total time of the discharge process exceeds 38 minutes.

## 2024-07-26 NOTE — THERAPY
Occupational Therapy Contact Note    Patient Name: Christoph Taylor  Age:  59 y.o., Sex:  male  Medical Record #: [id number]  Today's Date: 7/26/2024    OT consult rec'd. Pt d/c'd prior to OT eval. OT not contacted by RN.

## 2024-07-26 NOTE — DISCHARGE PLANNING
Pt is set for 1330 transport by [facility] today.    @ 0950: MYRON notified pt's son and pt's sister of time of transport at pt's request. Pt verbalized understanding and is agreeable to transport to [facility].    @ 1050: MYRON received phone call from [facility]; admissions wanting to verify pt's medication list. MYRON voalte messaged DO to confirm medication list. Per DO, medication list is correct. MYRON informed [facility].  .  MYRON emailed pt's sister list of resources provided to pt.

## 2024-07-26 NOTE — CARE PLAN
Problem: Knowledge Deficit - Standard  Goal: Patient and family/care givers will demonstrate understanding of plan of care, disease process/condition, diagnostic tests and medications  Description: Target End Date:  1-3 days or as soon as patient condition allows    Document in Patient Education    1.  Patient and family/caregiver oriented to unit, equipment, visitation policy and means for communicating concern  2.  Complete/review Learning Assessment  3.  Assess knowledge level of disease process/condition, treatment plan, diagnostic tests and medications  4.  Explain disease process/condition, treatment plan, diagnostic tests and medications  Outcome: Progressing     Problem: Skin Integrity  Goal: Skin integrity is maintained or improved  Description: Target End Date:  Prior to discharge or change in level of care    Document interventions on Skin Risk/Oscar flowsheet groups and corresponding LDA    1.  Assess and monitor skin integrity, appearance and/or temperature  2.  Assess risk factors for impaired skin integrity and/or pressures ulcers  3.  Implement precautions to protect skin integrity in collaboration with interdisciplinary team  4.  Implement pressure ulcer prevention protocol if at risk for skin breakdown  5.  Confirm wound care consult if at risk for skin breakdown  6.  Ensure patient use of pressure relieving devices  (Low air loss bed, waffle overlay, heel protectors, ROHO cushion, etc)  Outcome: Progressing     Problem: Fall Risk  Goal: Patient will remain free from falls  Description: Target End Date:  Prior to discharge or change in level of care    Document interventions on the Lizette Marroquin Fall Risk Assessment    1.  Assess for fall risk factors  2.  Implement fall precautions  Outcome: Progressing   The patient is Stable - Low risk of patient condition declining or worsening    Shift Goals  Clinical Goals: VSS, safety  Patient Goals: Snf   Family Goals: KATHERINE    Progress made toward(s)  clinical / shift goals:  VSS, safety    Patient is not progressing towards the following goals:

## 2024-07-26 NOTE — PROGRESS NOTES
Monitor Summary:     Rhythm: SR  Rate: 70-81  Ectopy: PVC  Measurements: 0.16/0.10/0.38           12 Hour Chart Check

## 2024-08-08 ENCOUNTER — APPOINTMENT (OUTPATIENT)
Dept: RADIOLOGY | Facility: MEDICAL CENTER | Age: 59
DRG: 638 | End: 2024-08-08
Attending: EMERGENCY MEDICINE
Payer: COMMERCIAL

## 2024-08-08 ENCOUNTER — HOSPITAL ENCOUNTER (INPATIENT)
Facility: MEDICAL CENTER | Age: 59
LOS: 4 days | DRG: 638 | End: 2024-08-12
Attending: EMERGENCY MEDICINE
Payer: COMMERCIAL

## 2024-08-08 DIAGNOSIS — R41.82 ALTERED MENTAL STATUS, UNSPECIFIED ALTERED MENTAL STATUS TYPE: ICD-10-CM

## 2024-08-08 DIAGNOSIS — R56.9 SEIZURE (HCC): ICD-10-CM

## 2024-08-08 DIAGNOSIS — M48.061 SPINAL STENOSIS OF LUMBAR REGION, UNSPECIFIED WHETHER NEUROGENIC CLAUDICATION PRESENT: ICD-10-CM

## 2024-08-08 DIAGNOSIS — E16.2 HYPOGLYCEMIA: ICD-10-CM

## 2024-08-08 PROBLEM — E11.9 TYPE 2 DIABETES MELLITUS (HCC): Status: ACTIVE | Noted: 2024-08-08

## 2024-08-08 PROBLEM — E87.6 HYPOKALEMIA: Status: ACTIVE | Noted: 2024-08-08

## 2024-08-08 LAB
ALBUMIN SERPL BCP-MCNC: 3.3 G/DL (ref 3.2–4.9)
ALBUMIN/GLOB SERPL: 1.1 G/DL
ALP SERPL-CCNC: 87 U/L (ref 30–99)
ALT SERPL-CCNC: 12 U/L (ref 2–50)
ANION GAP SERPL CALC-SCNC: 14 MMOL/L (ref 7–16)
APPEARANCE UR: CLEAR
AST SERPL-CCNC: 16 U/L (ref 12–45)
BACTERIA #/AREA URNS HPF: NEGATIVE /HPF
BASOPHILS # BLD AUTO: 0.2 % (ref 0–1.8)
BASOPHILS # BLD: 0.01 K/UL (ref 0–0.12)
BILIRUB SERPL-MCNC: 0.2 MG/DL (ref 0.1–1.5)
BILIRUB UR QL STRIP.AUTO: NEGATIVE
BUN SERPL-MCNC: 25 MG/DL (ref 8–22)
CALCIUM ALBUM COR SERPL-MCNC: 9.5 MG/DL (ref 8.5–10.5)
CALCIUM SERPL-MCNC: 8.9 MG/DL (ref 8.5–10.5)
CHLORIDE SERPL-SCNC: 100 MMOL/L (ref 96–112)
CO2 SERPL-SCNC: 25 MMOL/L (ref 20–33)
COLOR UR: YELLOW
CREAT SERPL-MCNC: 0.73 MG/DL (ref 0.5–1.4)
EKG IMPRESSION: NORMAL
EOSINOPHIL # BLD AUTO: 0.16 K/UL (ref 0–0.51)
EOSINOPHIL NFR BLD: 2.7 % (ref 0–6.9)
EPI CELLS #/AREA URNS HPF: NEGATIVE /HPF
ERYTHROCYTE [DISTWIDTH] IN BLOOD BY AUTOMATED COUNT: 42.5 FL (ref 35.9–50)
EST. AVERAGE GLUCOSE BLD GHB EST-MCNC: 237 MG/DL
ETHANOL BLD-MCNC: <10.1 MG/DL
FLUAV RNA SPEC QL NAA+PROBE: NEGATIVE
FLUBV RNA SPEC QL NAA+PROBE: NEGATIVE
GFR SERPLBLD CREATININE-BSD FMLA CKD-EPI: 104 ML/MIN/1.73 M 2
GLOBULIN SER CALC-MCNC: 3.1 G/DL (ref 1.9–3.5)
GLUCOSE BLD STRIP.AUTO-MCNC: 104 MG/DL (ref 65–99)
GLUCOSE BLD STRIP.AUTO-MCNC: 145 MG/DL (ref 65–99)
GLUCOSE BLD STRIP.AUTO-MCNC: 147 MG/DL (ref 65–99)
GLUCOSE BLD STRIP.AUTO-MCNC: 206 MG/DL (ref 65–99)
GLUCOSE BLD STRIP.AUTO-MCNC: 225 MG/DL (ref 65–99)
GLUCOSE BLD STRIP.AUTO-MCNC: 255 MG/DL (ref 65–99)
GLUCOSE BLD STRIP.AUTO-MCNC: 259 MG/DL (ref 65–99)
GLUCOSE BLD STRIP.AUTO-MCNC: 266 MG/DL (ref 65–99)
GLUCOSE BLD STRIP.AUTO-MCNC: 270 MG/DL (ref 65–99)
GLUCOSE BLD STRIP.AUTO-MCNC: 273 MG/DL (ref 65–99)
GLUCOSE BLD STRIP.AUTO-MCNC: 65 MG/DL (ref 65–99)
GLUCOSE BLD STRIP.AUTO-MCNC: 72 MG/DL (ref 65–99)
GLUCOSE BLD STRIP.AUTO-MCNC: 96 MG/DL (ref 65–99)
GLUCOSE SERPL-MCNC: 69 MG/DL (ref 65–99)
GLUCOSE UR STRIP.AUTO-MCNC: 100 MG/DL
HBA1C MFR BLD: 9.9 % (ref 4–5.6)
HCT VFR BLD AUTO: 36.6 % (ref 42–52)
HGB BLD-MCNC: 11.7 G/DL (ref 14–18)
HYALINE CASTS #/AREA URNS LPF: ABNORMAL /LPF
IMM GRANULOCYTES # BLD AUTO: 0.1 K/UL (ref 0–0.11)
IMM GRANULOCYTES NFR BLD AUTO: 1.7 % (ref 0–0.9)
KETONES UR STRIP.AUTO-MCNC: NEGATIVE MG/DL
LACTATE SERPL-SCNC: 1.5 MMOL/L (ref 0.5–2)
LACTATE SERPL-SCNC: 2.9 MMOL/L (ref 0.5–2)
LEUKOCYTE ESTERASE UR QL STRIP.AUTO: NEGATIVE
LYMPHOCYTES # BLD AUTO: 1.48 K/UL (ref 1–4.8)
LYMPHOCYTES NFR BLD: 25.3 % (ref 22–41)
MAGNESIUM SERPL-MCNC: 1.6 MG/DL (ref 1.5–2.5)
MCH RBC QN AUTO: 26.8 PG (ref 27–33)
MCHC RBC AUTO-ENTMCNC: 32 G/DL (ref 32.3–36.5)
MCV RBC AUTO: 83.8 FL (ref 81.4–97.8)
MICRO URNS: ABNORMAL
MONOCYTES # BLD AUTO: 0.41 K/UL (ref 0–0.85)
MONOCYTES NFR BLD AUTO: 7 % (ref 0–13.4)
NEUTROPHILS # BLD AUTO: 3.69 K/UL (ref 1.82–7.42)
NEUTROPHILS NFR BLD: 63.1 % (ref 44–72)
NITRITE UR QL STRIP.AUTO: NEGATIVE
NRBC # BLD AUTO: 0 K/UL
NRBC BLD-RTO: 0 /100 WBC (ref 0–0.2)
PH UR STRIP.AUTO: 7 [PH] (ref 5–8)
PLATELET # BLD AUTO: 215 K/UL (ref 164–446)
PMV BLD AUTO: 8.7 FL (ref 9–12.9)
POTASSIUM SERPL-SCNC: 3.2 MMOL/L (ref 3.6–5.5)
PROCALCITONIN SERPL-MCNC: <0.05 NG/ML
PROT SERPL-MCNC: 6.4 G/DL (ref 6–8.2)
PROT UR QL STRIP: 100 MG/DL
RBC # BLD AUTO: 4.37 M/UL (ref 4.7–6.1)
RBC # URNS HPF: ABNORMAL /HPF
RBC UR QL AUTO: ABNORMAL
RSV RNA SPEC QL NAA+PROBE: NEGATIVE
SARS-COV-2 RNA RESP QL NAA+PROBE: NOTDETECTED
SODIUM SERPL-SCNC: 139 MMOL/L (ref 135–145)
SP GR UR STRIP.AUTO: 1.01
UROBILINOGEN UR STRIP.AUTO-MCNC: 0.2 MG/DL
WBC # BLD AUTO: 5.9 K/UL (ref 4.8–10.8)
WBC #/AREA URNS HPF: ABNORMAL /HPF

## 2024-08-08 PROCEDURE — 83605 ASSAY OF LACTIC ACID: CPT | Mod: 91

## 2024-08-08 PROCEDURE — 700105 HCHG RX REV CODE 258: Performed by: INTERNAL MEDICINE

## 2024-08-08 PROCEDURE — 96375 TX/PRO/DX INJ NEW DRUG ADDON: CPT

## 2024-08-08 PROCEDURE — 80053 COMPREHEN METABOLIC PANEL: CPT

## 2024-08-08 PROCEDURE — 700111 HCHG RX REV CODE 636 W/ 250 OVERRIDE (IP): Performed by: EMERGENCY MEDICINE

## 2024-08-08 PROCEDURE — 700102 HCHG RX REV CODE 250 W/ 637 OVERRIDE(OP)

## 2024-08-08 PROCEDURE — 82077 ASSAY SPEC XCP UR&BREATH IA: CPT

## 2024-08-08 PROCEDURE — 93005 ELECTROCARDIOGRAM TRACING: CPT

## 2024-08-08 PROCEDURE — 0241U HCHG SARS-COV-2 COVID-19 NFCT DS RESP RNA 4 TRGT ED POC: CPT

## 2024-08-08 PROCEDURE — 87086 URINE CULTURE/COLONY COUNT: CPT

## 2024-08-08 PROCEDURE — 99285 EMERGENCY DEPT VISIT HI MDM: CPT

## 2024-08-08 PROCEDURE — 700105 HCHG RX REV CODE 258

## 2024-08-08 PROCEDURE — 71045 X-RAY EXAM CHEST 1 VIEW: CPT

## 2024-08-08 PROCEDURE — 82962 GLUCOSE BLOOD TEST: CPT | Mod: 91

## 2024-08-08 PROCEDURE — 93005 ELECTROCARDIOGRAM TRACING: CPT | Performed by: EMERGENCY MEDICINE

## 2024-08-08 PROCEDURE — 85025 COMPLETE CBC W/AUTO DIFF WBC: CPT

## 2024-08-08 PROCEDURE — 93005 ELECTROCARDIOGRAM TRACING: CPT | Performed by: INTERNAL MEDICINE

## 2024-08-08 PROCEDURE — 700102 HCHG RX REV CODE 250 W/ 637 OVERRIDE(OP): Performed by: INTERNAL MEDICINE

## 2024-08-08 PROCEDURE — 700101 HCHG RX REV CODE 250

## 2024-08-08 PROCEDURE — A9270 NON-COVERED ITEM OR SERVICE: HCPCS

## 2024-08-08 PROCEDURE — 96376 TX/PRO/DX INJ SAME DRUG ADON: CPT

## 2024-08-08 PROCEDURE — 36415 COLL VENOUS BLD VENIPUNCTURE: CPT

## 2024-08-08 PROCEDURE — 770020 HCHG ROOM/CARE - TELE (206)

## 2024-08-08 PROCEDURE — 700111 HCHG RX REV CODE 636 W/ 250 OVERRIDE (IP): Mod: JZ

## 2024-08-08 PROCEDURE — 99223 1ST HOSP IP/OBS HIGH 75: CPT | Mod: AI,GC

## 2024-08-08 PROCEDURE — 84145 PROCALCITONIN (PCT): CPT

## 2024-08-08 PROCEDURE — 83036 HEMOGLOBIN GLYCOSYLATED A1C: CPT

## 2024-08-08 PROCEDURE — 96374 THER/PROPH/DIAG INJ IV PUSH: CPT

## 2024-08-08 PROCEDURE — 700105 HCHG RX REV CODE 258: Performed by: EMERGENCY MEDICINE

## 2024-08-08 PROCEDURE — 83735 ASSAY OF MAGNESIUM: CPT

## 2024-08-08 PROCEDURE — 70450 CT HEAD/BRAIN W/O DYE: CPT

## 2024-08-08 PROCEDURE — 81001 URINALYSIS AUTO W/SCOPE: CPT

## 2024-08-08 RX ORDER — VENLAFAXINE HYDROCHLORIDE 75 MG/1
75 TABLET, EXTENDED RELEASE ORAL DAILY
COMMUNITY
End: 2024-08-29

## 2024-08-08 RX ORDER — OXYCODONE HYDROCHLORIDE 5 MG/1
5 TABLET ORAL EVERY 4 HOURS PRN
Status: ON HOLD | COMMUNITY
End: 2024-08-12

## 2024-08-08 RX ORDER — DEXTROSE MONOHYDRATE 100 MG/ML
INJECTION, SOLUTION INTRAVENOUS ONCE
Status: COMPLETED | OUTPATIENT
Start: 2024-08-08 | End: 2024-08-08

## 2024-08-08 RX ORDER — ASPIRIN 81 MG/1
81 TABLET ORAL DAILY
Status: DISCONTINUED | OUTPATIENT
Start: 2024-08-08 | End: 2024-08-12 | Stop reason: HOSPADM

## 2024-08-08 RX ORDER — DEXTROSE MONOHYDRATE 25 G/50ML
INJECTION, SOLUTION INTRAVENOUS
Status: COMPLETED
Start: 2024-08-08 | End: 2024-08-08

## 2024-08-08 RX ORDER — ACETAMINOPHEN 325 MG/1
650 TABLET ORAL EVERY 6 HOURS PRN
Status: DISCONTINUED | OUTPATIENT
Start: 2024-08-08 | End: 2024-08-12 | Stop reason: HOSPADM

## 2024-08-08 RX ORDER — PROMETHAZINE HYDROCHLORIDE 25 MG/1
12.5-25 SUPPOSITORY RECTAL EVERY 4 HOURS PRN
Status: DISCONTINUED | OUTPATIENT
Start: 2024-08-08 | End: 2024-08-12 | Stop reason: HOSPADM

## 2024-08-08 RX ORDER — DEXTROSE MONOHYDRATE 25 G/50ML
50 INJECTION, SOLUTION INTRAVENOUS ONCE
Status: COMPLETED | OUTPATIENT
Start: 2024-08-08 | End: 2024-08-08

## 2024-08-08 RX ORDER — AMOXICILLIN 250 MG
2 CAPSULE ORAL EVERY EVENING
Status: DISCONTINUED | OUTPATIENT
Start: 2024-08-08 | End: 2024-08-12 | Stop reason: HOSPADM

## 2024-08-08 RX ORDER — ENOXAPARIN SODIUM 100 MG/ML
40 INJECTION SUBCUTANEOUS DAILY
Status: DISCONTINUED | OUTPATIENT
Start: 2024-08-08 | End: 2024-08-12 | Stop reason: HOSPADM

## 2024-08-08 RX ORDER — ACETAMINOPHEN 325 MG/1
650 TABLET ORAL EVERY 4 HOURS PRN
COMMUNITY

## 2024-08-08 RX ORDER — ONDANSETRON 4 MG/1
4 TABLET, ORALLY DISINTEGRATING ORAL EVERY 4 HOURS PRN
Status: DISCONTINUED | OUTPATIENT
Start: 2024-08-08 | End: 2024-08-12 | Stop reason: HOSPADM

## 2024-08-08 RX ORDER — GUAIFENESIN/DEXTROMETHORPHAN 100-10MG/5
10 SYRUP ORAL EVERY 6 HOURS PRN
Status: DISCONTINUED | OUTPATIENT
Start: 2024-08-08 | End: 2024-08-12 | Stop reason: HOSPADM

## 2024-08-08 RX ORDER — BISACODYL 10 MG
10 SUPPOSITORY, RECTAL RECTAL
Status: ON HOLD | COMMUNITY
End: 2024-08-12

## 2024-08-08 RX ORDER — PROCHLORPERAZINE EDISYLATE 5 MG/ML
5-10 INJECTION INTRAMUSCULAR; INTRAVENOUS EVERY 4 HOURS PRN
Status: DISCONTINUED | OUTPATIENT
Start: 2024-08-08 | End: 2024-08-12 | Stop reason: HOSPADM

## 2024-08-08 RX ORDER — POTASSIUM CHLORIDE 1500 MG/1
40 TABLET, EXTENDED RELEASE ORAL ONCE
Status: COMPLETED | OUTPATIENT
Start: 2024-08-08 | End: 2024-08-08

## 2024-08-08 RX ORDER — DEXTROSE MONOHYDRATE 100 MG/ML
INJECTION, SOLUTION INTRAVENOUS CONTINUOUS
Status: DISCONTINUED | OUTPATIENT
Start: 2024-08-08 | End: 2024-08-08

## 2024-08-08 RX ORDER — OXYCODONE HYDROCHLORIDE 5 MG/1
5 TABLET ORAL EVERY 4 HOURS PRN
Status: DISCONTINUED | OUTPATIENT
Start: 2024-08-08 | End: 2024-08-12 | Stop reason: HOSPADM

## 2024-08-08 RX ORDER — VENLAFAXINE HYDROCHLORIDE 75 MG/1
75 CAPSULE, EXTENDED RELEASE ORAL DAILY
Status: DISCONTINUED | OUTPATIENT
Start: 2024-08-08 | End: 2024-08-12 | Stop reason: HOSPADM

## 2024-08-08 RX ORDER — DEXTROSE MONOHYDRATE 50 MG/ML
INJECTION, SOLUTION INTRAVENOUS CONTINUOUS
Status: DISCONTINUED | OUTPATIENT
Start: 2024-08-08 | End: 2024-08-08

## 2024-08-08 RX ORDER — LABETALOL HYDROCHLORIDE 5 MG/ML
10 INJECTION, SOLUTION INTRAVENOUS EVERY 4 HOURS PRN
Status: DISCONTINUED | OUTPATIENT
Start: 2024-08-08 | End: 2024-08-12 | Stop reason: HOSPADM

## 2024-08-08 RX ORDER — PROMETHAZINE HYDROCHLORIDE 25 MG/1
12.5-25 TABLET ORAL EVERY 4 HOURS PRN
Status: DISCONTINUED | OUTPATIENT
Start: 2024-08-08 | End: 2024-08-12 | Stop reason: HOSPADM

## 2024-08-08 RX ORDER — NALOXONE HYDROCHLORIDE 0.4 MG/ML
0.4 INJECTION, SOLUTION INTRAMUSCULAR; INTRAVENOUS; SUBCUTANEOUS ONCE
Status: COMPLETED | OUTPATIENT
Start: 2024-08-08 | End: 2024-08-08

## 2024-08-08 RX ORDER — INSULIN LISPRO 100 [IU]/ML
2-10 INJECTION, SOLUTION INTRAVENOUS; SUBCUTANEOUS 2 TIMES DAILY
COMMUNITY
End: 2024-08-29 | Stop reason: SDUPTHER

## 2024-08-08 RX ORDER — LOPERAMIDE HCL 2 MG
4 CAPSULE ORAL EVERY 6 HOURS PRN
COMMUNITY
End: 2024-08-29

## 2024-08-08 RX ORDER — SODIUM CHLORIDE 9 MG/ML
INJECTION, SOLUTION INTRAVENOUS CONTINUOUS
Status: DISCONTINUED | OUTPATIENT
Start: 2024-08-08 | End: 2024-08-09

## 2024-08-08 RX ORDER — ONDANSETRON 2 MG/ML
4 INJECTION INTRAMUSCULAR; INTRAVENOUS EVERY 4 HOURS PRN
Status: DISCONTINUED | OUTPATIENT
Start: 2024-08-08 | End: 2024-08-12 | Stop reason: HOSPADM

## 2024-08-08 RX ORDER — POLYETHYLENE GLYCOL 3350 17 G/17G
1 POWDER, FOR SOLUTION ORAL
Status: DISCONTINUED | OUTPATIENT
Start: 2024-08-08 | End: 2024-08-12 | Stop reason: HOSPADM

## 2024-08-08 RX ORDER — DEXTROSE MONOHYDRATE 25 G/50ML
25 INJECTION, SOLUTION INTRAVENOUS
Status: DISCONTINUED | OUTPATIENT
Start: 2024-08-08 | End: 2024-08-12 | Stop reason: HOSPADM

## 2024-08-08 RX ADMIN — OXYCODONE HYDROCHLORIDE 5 MG: 5 TABLET ORAL at 08:09

## 2024-08-08 RX ADMIN — DEXTROSE MONOHYDRATE 50 ML: 25 INJECTION, SOLUTION INTRAVENOUS at 02:19

## 2024-08-08 RX ADMIN — FENTANYL CITRATE 50 MCG: 50 INJECTION, SOLUTION INTRAMUSCULAR; INTRAVENOUS at 04:36

## 2024-08-08 RX ADMIN — DEXTROSE MONOHYDRATE: 100 INJECTION, SOLUTION INTRAVENOUS at 03:35

## 2024-08-08 RX ADMIN — ENOXAPARIN SODIUM 40 MG: 100 INJECTION SUBCUTANEOUS at 17:02

## 2024-08-08 RX ADMIN — DEXTROSE MONOHYDRATE 50 ML: 25 INJECTION, SOLUTION INTRAVENOUS at 00:59

## 2024-08-08 RX ADMIN — INSULIN HUMAN 3 UNITS: 100 INJECTION, SOLUTION PARENTERAL at 13:32

## 2024-08-08 RX ADMIN — INSULIN HUMAN 2 UNITS: 100 INJECTION, SOLUTION PARENTERAL at 19:39

## 2024-08-08 RX ADMIN — ASPIRIN 81 MG: 81 TABLET, COATED ORAL at 06:30

## 2024-08-08 RX ADMIN — OXYCODONE HYDROCHLORIDE 5 MG: 5 TABLET ORAL at 17:02

## 2024-08-08 RX ADMIN — INSULIN HUMAN 3 UNITS: 100 INJECTION, SOLUTION PARENTERAL at 21:42

## 2024-08-08 RX ADMIN — NALOXONE HYDROCHLORIDE 0.4 MG: 0.4 INJECTION, SOLUTION INTRAMUSCULAR; INTRAVENOUS; SUBCUTANEOUS at 02:45

## 2024-08-08 RX ADMIN — SODIUM CHLORIDE: 9 INJECTION, SOLUTION INTRAVENOUS at 08:41

## 2024-08-08 RX ADMIN — POTASSIUM CHLORIDE 40 MEQ: 1500 TABLET, EXTENDED RELEASE ORAL at 05:49

## 2024-08-08 RX ADMIN — VENLAFAXINE HYDROCHLORIDE 75 MG: 75 CAPSULE, EXTENDED RELEASE ORAL at 06:30

## 2024-08-08 RX ADMIN — OXYCODONE HYDROCHLORIDE 5 MG: 5 TABLET ORAL at 06:30

## 2024-08-08 ASSESSMENT — ENCOUNTER SYMPTOMS
PALPITATIONS: 0
ORTHOPNEA: 0
PHOTOPHOBIA: 0
NAUSEA: 0
HEMOPTYSIS: 0
DEPRESSION: 0
ABDOMINAL PAIN: 0
SEIZURES: 1
BRUISES/BLEEDS EASILY: 0
FEVER: 0
HEARTBURN: 0
BLURRED VISION: 0
SPUTUM PRODUCTION: 0
HEADACHES: 0
CHILLS: 0
COUGH: 0
WEIGHT LOSS: 0
NECK PAIN: 0
DIZZINESS: 0
LOSS OF CONSCIOUSNESS: 1
MYALGIAS: 0
CLAUDICATION: 0
TREMORS: 0
TINGLING: 0
VOMITING: 0
DOUBLE VISION: 0

## 2024-08-08 ASSESSMENT — SOCIAL DETERMINANTS OF HEALTH (SDOH)
WITHIN THE LAST YEAR, HAVE TO BEEN RAPED OR FORCED TO HAVE ANY KIND OF SEXUAL ACTIVITY BY YOUR PARTNER OR EX-PARTNER?: NO
WITHIN THE LAST YEAR, HAVE YOU BEEN AFRAID OF YOUR PARTNER OR EX-PARTNER?: NO
WITHIN THE LAST YEAR, HAVE YOU BEEN KICKED, HIT, SLAPPED, OR OTHERWISE PHYSICALLY HURT BY YOUR PARTNER OR EX-PARTNER?: NO
WITHIN THE LAST YEAR, HAVE YOU BEEN HUMILIATED OR EMOTIONALLY ABUSED IN OTHER WAYS BY YOUR PARTNER OR EX-PARTNER?: NO

## 2024-08-08 ASSESSMENT — COGNITIVE AND FUNCTIONAL STATUS - GENERAL
TURNING FROM BACK TO SIDE WHILE IN FLAT BAD: A LOT
SUGGESTED CMS G CODE MODIFIER MOBILITY: CL
MOBILITY SCORE: 12
SUGGESTED CMS G CODE MODIFIER DAILY ACTIVITY: CK
DRESSING REGULAR LOWER BODY CLOTHING: A LOT
TOILETING: A LOT
DRESSING REGULAR UPPER BODY CLOTHING: A LOT
STANDING UP FROM CHAIR USING ARMS: A LOT
WALKING IN HOSPITAL ROOM: A LOT
MOVING FROM LYING ON BACK TO SITTING ON SIDE OF FLAT BED: A LOT
HELP NEEDED FOR BATHING: A LOT
DAILY ACTIVITIY SCORE: 16
CLIMB 3 TO 5 STEPS WITH RAILING: A LOT
MOVING TO AND FROM BED TO CHAIR: A LOT

## 2024-08-08 ASSESSMENT — PAIN DESCRIPTION - PAIN TYPE
TYPE: ACUTE PAIN

## 2024-08-08 ASSESSMENT — PATIENT HEALTH QUESTIONNAIRE - PHQ9
5. POOR APPETITE OR OVEREATING: NOT AT ALL
9. THOUGHTS THAT YOU WOULD BE BETTER OFF DEAD, OR OF HURTING YOURSELF: NOT AT ALL
1. LITTLE INTEREST OR PLEASURE IN DOING THINGS: NEARLY EVERY DAY
4. FEELING TIRED OR HAVING LITTLE ENERGY: NEARLY EVERY DAY
SUM OF ALL RESPONSES TO PHQ QUESTIONS 1-9: 15
6. FEELING BAD ABOUT YOURSELF - OR THAT YOU ARE A FAILURE OR HAVE LET YOURSELF OR YOUR FAMILY DOWN: SEVERAL DAYS
8. MOVING OR SPEAKING SO SLOWLY THAT OTHER PEOPLE COULD HAVE NOTICED. OR THE OPPOSITE, BEING SO FIGETY OR RESTLESS THAT YOU HAVE BEEN MOVING AROUND A LOT MORE THAN USUAL: SEVERAL DAYS
SUM OF ALL RESPONSES TO PHQ9 QUESTIONS 1 AND 2: 6
7. TROUBLE CONCENTRATING ON THINGS, SUCH AS READING THE NEWSPAPER OR WATCHING TELEVISION: SEVERAL DAYS
3. TROUBLE FALLING OR STAYING ASLEEP OR SLEEPING TOO MUCH: NEARLY EVERY DAY
2. FEELING DOWN, DEPRESSED, IRRITABLE, OR HOPELESS: NEARLY EVERY DAY

## 2024-08-08 ASSESSMENT — LIFESTYLE VARIABLES
HOW MANY TIMES IN THE PAST YEAR HAVE YOU HAD 5 OR MORE DRINKS IN A DAY: 0
ON A TYPICAL DAY WHEN YOU DRINK ALCOHOL HOW MANY DRINKS DO YOU HAVE: 0
DOES PATIENT WANT TO STOP DRINKING: NO
AVERAGE NUMBER OF DAYS PER WEEK YOU HAVE A DRINK CONTAINING ALCOHOL: 0
TOTAL SCORE: 0
EVER FELT BAD OR GUILTY ABOUT YOUR DRINKING: NO
CONSUMPTION TOTAL: NEGATIVE
HAVE PEOPLE ANNOYED YOU BY CRITICIZING YOUR DRINKING: NO
HAVE YOU EVER FELT YOU SHOULD CUT DOWN ON YOUR DRINKING: NO
EVER HAD A DRINK FIRST THING IN THE MORNING TO STEADY YOUR NERVES TO GET RID OF A HANGOVER: NO
ALCOHOL_USE: NO

## 2024-08-08 ASSESSMENT — FIBROSIS 4 INDEX
FIB4 SCORE: 1.29
FIB4 SCORE: .7856742013183861381

## 2024-08-08 NOTE — DISCHARGE PLANNING
Case Management Discharge Planning    Admission Date: 8/8/2024  GMLOS: 2.1  ALOS: 0    6-Clicks ADL Score: 16  6-Clicks Mobility Score: 12  PT and/or OT Eval ordered: Yes  Post-acute Referrals Ordered: Yes  Post-acute Choice Obtained: No  Has referral(s) been sent to post-acute provider:  No      Anticipated Discharge Dispo: Discharge Disposition: Discharged to home/self care (01)    DME Needed: No    Action(s) Taken: Updated Provider/Nurse on Discharge Plan Patient discussed during IDT rounds with medical team.    Escalations Completed: None    Medically Clear: No    Next Steps: Case Management will continue to follow for discharge planning needs.    Barriers to Discharge: Medical clearance    Is the patient up for discharge tomorrow: No    RN Case Manager met with patient at bedside and obtained the information used in this assessment. Patient verified accuracy of facesheet; patient lives in a single story home alone.  Prior to current hospitalization, patient was independent with ADLS/IADLS. Patient drives and is able to attend necessary MD appointments. Patient's PCP is Orin Darnell and prefers to use [facility] on [facility]. Patient has no financial concerns. Patient has support from neighbors. Denies any history of substance use and denies any diagnosis of mental illness.    Care Transition Team Assessment    Information Source: Patient  Orientation Level: Oriented X4  Information Given By: Patient  Who is responsible for making decisions for patient? : Patient    Readmission Evaluation  Is this a readmission?: Yes - unplanned readmission    Elopement Risk  Legal Hold: No  Ambulatory or Self Mobile in Wheelchair: Yes  Disoriented: No  Psychiatric Symptoms: None  History of Wandering: No  Elopement this Admit: No  Vocalizing Wanting to Leave: No  Displays Behaviors, Body Language Wanting to Leave: No-Not at Risk for Elopement    Interdisciplinary Discharge Planning  Does Admitting Nurse Feel This Could  be a Complex Discharge?: No  Primary Care Physician: Orin Darnell  Lives with - Patient's Self Care Capacity: Alone and Able to Care For Self  Patient or legal guardian wants to designate a caregiver: No  Support Systems: None  Housing / Facility: Mobile Home  [facility] of Care Facility: [facility]  Do You Take your Prescribed Medications Regularly: Yes  Able to Return to Previous ADL's: Future Time w/Therapy  Mobility Issues: Yes  Prior Services: None  Patient Prefers to be Discharged to:: IPR or HH  Assistance Needed: Yes  Durable Medical Equipment: Walker (Wheelchair)    Discharge Preparedness  What is your plan after discharge?: Home with help, Home health care  What are your discharge supports?:  (Neighbor)  Prior Functional Level: Ambulatory, Independent with Activities of Daily Living, Independent with Medication Management  Difficulity with ADLs: None  Difficulity with IADLs: None    Functional Assesment  Prior Functional Level: Ambulatory, Independent with Activities of Daily Living, Independent with Medication Management    Finances  Financial Barriers to Discharge: No  Prescription Coverage: Yes    Vision / Hearing Impairment  Vision Impairment : No  Right Eye Vision: Impaired, Wears Glasses  Left Eye Vision: Impaired, Wears Glasses  Hearing Impairment : No  Hearing Impairment: Right Ear, Hearing Device Not Available    Values / Beliefs / Concerns  Values / Beliefs Concerns : No    Advance Directive  Advance Directive?: None  Advance Directive offered?: AD Booklet refused    Domestic Abuse  Have you ever been the victim of abuse or violence?: No  Possible Abuse/Neglect Reported to:: Not Applicable    Psychological Assessment  History of Substance Abuse: None  History of Psychiatric Problems: No    Discharge Risks or Barriers  Discharge risks or barriers?: No    Anticipated Discharge Information  Discharge Disposition: Discharged to home/self care (01)

## 2024-08-08 NOTE — ASSESSMENT & PLAN NOTE
No previous seizures Hx, most likely secondary to hypoglycemia episode, patient at baseline  -admission to floor to continue monitoring   -if presents new seizure can consider EEG and neurology evaluation   -fall precautions   -seizures precautions

## 2024-08-08 NOTE — ASSESSMENT & PLAN NOTE
>>ASSESSMENT AND PLAN FOR SEIZURE (HCC) WRITTEN ON 8/10/2024  7:36 AM BY RICHARD APARICIO D.O.    No previous seizures Hx, most likely secondary to hypoglycemia episode, patient at baseline  -admission to floor to continue monitoring   -if presents new seizure can consider EEG and neurology evaluation   -fall precautions   -seizures precautions     8/9 cont to monitor, no seizure activity noted  Neurochecks, likely dc in 24-48 hours, if bs controlled    8/10 okay to DC telemetry monitoring  Sinus rhythm

## 2024-08-08 NOTE — PROGRESS NOTES
4 Eyes Skin Assessment Completed by PENNIE Grullon and PENNIE Mcgovern.    Head WDL  Ears WDL  Nose WDL  Mouth WDL  Neck WDL  Breast/Chest WDL  Shoulder Blades WDL  Spine WDL, surgical scar lumbar  (R) Arm/Elbow/Hand WDL  (L) Arm/Elbow/Hand WDL  Abdomen WDL  Groin WDL  Scrotum/Coccyx/Buttocks WDL  (R) Leg Scab  (L) Leg Scab  (R) Heel/Foot/Toe Redness and Blanching  (L) Heel/Foot/Toe Redness and Blanching, abrasion on L ankle           Devices In Places Tele Box, Blood Pressure Cuff, Pulse Ox, and Nasal Cannula      Interventions In Place Gray Ear Foams, pillows     Possible Skin Injury No    Pictures Uploaded Into Epic Yes  Wound Consult Placed N/A  RN Wound Prevention Protocol Ordered No

## 2024-08-08 NOTE — ED PROVIDER NOTES
***This patient had refractive cataract surgery performed. A monofocal IOL was placed to achieve a target refraction of plano (which should provide them with satisfactory distance vision). *** ED Provider Note    CHIEF COMPLAINT  Chief Complaint   Patient presents with    Low Blood Sugar     Pt BIBA from [facility] for low blood sugar. Facility checked BGL at 60, was given insulin, blood sugar read error, per facility pt had a seizure and became hypoxic, CPR was initiated by staff. Upon EMS arrival given Glucagon and D10 and BGL to 72 currently. Pt is A&Ox3, requiring 4 L NC.        EXTERNAL RECORDS REVIEWED  Inpatient Notes discharge summary 7/26/2024.  History of diabetes on insulin, CKD, HLD, GERD, chronic back pain with recent history of cauda equina syndrome status post C4-C7 ACDF on 6/4 and L4-L5 laminectomies with Stealth fusion 6/11 who presented 7/22/2024 with generalized malaise fatigue and a fall.  Evaluated in the ED on 7/21 workup was unremarkable and discharged home.  Upon arrival patient was hypotensive.  Responded to IV fluid.  Hypoxic but improved with 3 L by nasal cannula.  Elevated proBNP, troponin and D-dimer.  Chest x-ray without acute abnormalities.  EKG normal sinus rhythm.  IV diuresis to maintain blood pressure.  A1c of 12, blood sugar was optimized on Lantus twice daily and sliding scale.  Diabetic wound to foot did not appear infected.    HPI/ROS  LIMITATION TO HISTORY   Select: Altered mental status / Confusion  OUTSIDE HISTORIAN(S):  EMS      Christoph Taylor is a 59 y.o. male who presents to the emergency department by ambulance from [facility] nursing facility for altered mental status.  Patient was found to be altered, staff reportedly checked his glucose and it was 60 at which time they gave insulin.  Unsure if this was misinterpreted history or indeed a manage air.  Patient became increasingly altered from that point, then unresponsive with reported seizure-like activity or cyanosis and foaming at the mouth.  Given glucagon and D10 for EMS with some improvement.    During my bedside evaluation patient is conversive but unknown why he is at the emergency  "department.  Feels \"unwell\" but cannot elaborate.    PAST MEDICAL HISTORY   has a past medical history of Abnormal electrocardiogram (ECG) (EKG) (11/8/2021), KRISTAL (acute kidney injury) ([facility]) (11/8/2021), Chest pain in adult (11/8/2021), CKD (chronic kidney disease) stage 3, GFR 30-59 ml/min (11/7/2021), Diabetes ([facility]), Diabetic ketoacidosis without coma associated with type 2 diabetes mellitus ([facility]) (11/7/2021), Diarrhea (3/18/2022), DKA, type 1, not at goal ([facility]) (7/28/2022), Esophagitis (7/28/2022), [place] (hard of hearing), and Hypercholesteremia.    SURGICAL HISTORY   has a past surgical history that includes other; upper gi endoscopy,diagnosis (N/A, 3/14/2023); finger or hand incision and drainage (Right, 6/12/2023); cervical disk and fusion anterior (N/A, 6/4/2024); fusion, spine, lumbar, plif (N/A, 6/7/2024); and lumbar laminectomy diskectomy (N/A, 6/7/2024).    FAMILY HISTORY  Family History   Problem Relation Age of Onset    Heart Disease Father        SOCIAL HISTORY  Social History     Tobacco Use    Smoking status: Former    Smokeless tobacco: Never   Vaping Use    Vaping status: Not on file   Substance and Sexual Activity    Alcohol use: Not Currently    Drug use: Not Currently    Sexual activity: Not on file       CURRENT MEDICATIONS  Home Medications       Reviewed by Yadi Ashton R.N. (Registered Nurse) on 08/08/24 at 0625  Med List Status: Complete     Medication Last Dose Status   acetaminophen (TYLENOL) 325 MG Tab 8/6/2024 Active   aspirin 81 MG EC tablet 8/7/2024 Active   bisacodyl (DULCOLAX) 10 MG Suppos PRN Active   diclofenac sodium (VOLTAREN) 1 % Gel 8/7/2024 Active   insulin GLARGINE (LANTUS,SEMGLEE) 100 UNIT/ML Solution 8/7/2024 Active   insulin lispro 100 UNIT/ML INJ 8/7/2024 Active   loperamide (IMODIUM) 2 MG Cap 8/1/2024 Active   magnesium hydroxide (MILK OF MAGNESIA) 400 MG/5ML Suspension PRN Active   oxyCODONE immediate-release (ROXICODONE) 5 MG Tab 8/7/2024 Active   sodium phosphate " (FLEET) 7-19 GM/118ML Enema PRN Active   Venlafaxine HCl 75 MG TABLET SR 24 HR 8/7/2024 Active                  Audit from Redirected Encounters    **Home medications have not yet been reviewed for this encounter**         ALLERGIES  Allergies   Allergen Reactions    Ketamine      aggressive       PHYSICAL EXAM  VITAL SIGNS: BP (!) 142/90   Pulse 89   Temp 36.7 °C (98.1 °F) (Temporal)   Resp 16   Ht 1.829 m (6')   Wt 63.6 kg (140 lb 3.4 oz)   SpO2 95%   BMI 19.02 kg/m²    Pulse ox interpretation: I interpret this pulse ox as normal.  Constitutional: Somnolent.  No acute distress unable to assess  HENT: Normocephalic, atraumatic. Bilateral external ears normal, Nose normal.  Dry mucous membranes.    Eyes: Pupils are equal and reactive, Conjunctiva normal.   Neck: Normal range of motion, Supple  Lymphatic: No lymphadenopathy noted.   Cardiovascular: Regular rate and rhythm, no murmurs. Distal pulses intact.  No peripheral edema.  Thorax & Lungs: Normal breath sounds.  No wheezing/rales/ronchi. No increased work of breathing   Abdomen: Soft, non-distended.  No grimace or withdrawal to palpation.  Skin: Warm.  Diaphoretic   Musculoskeletal: Good range of motion in all major joints. No major deformities noted.   Neurologic: Somnolent but arousable and conversive  Psychiatric: Affect normal, Judgment normal, Mood normal.       EKG/LABS  Results for orders placed or performed during the hospital encounter of 08/08/24   Lactic Acid   Result Value Ref Range    Lactic Acid 2.9 (H) 0.5 - 2.0 mmol/L   Lactic Acid   Result Value Ref Range    Lactic Acid 1.5 0.5 - 2.0 mmol/L   CBC with Differential   Result Value Ref Range    WBC 5.9 4.8 - 10.8 K/uL    RBC 4.37 (L) 4.70 - 6.10 M/uL    Hemoglobin 11.7 (L) 14.0 - 18.0 g/dL    Hematocrit 36.6 (L) 42.0 - 52.0 %    MCV 83.8 81.4 - 97.8 fL    MCH 26.8 (L) 27.0 - 33.0 pg    MCHC 32.0 (L) 32.3 - 36.5 g/dL    RDW 42.5 35.9 - 50.0 fL    Platelet Count 215 164 - 446 K/uL    MPV 8.7  (L) 9.0 - 12.9 fL    Neutrophils-Polys 63.10 44.00 - 72.00 %    Lymphocytes 25.30 22.00 - 41.00 %    Monocytes 7.00 0.00 - 13.40 %    Eosinophils 2.70 0.00 - 6.90 %    Basophils 0.20 0.00 - 1.80 %    Immature Granulocytes 1.70 (H) 0.00 - 0.90 %    Nucleated RBC 0.00 0.00 - 0.20 /100 WBC    Neutrophils (Absolute) 3.69 1.82 - 7.42 K/uL    Lymphs (Absolute) 1.48 1.00 - 4.80 K/uL    Monos (Absolute) 0.41 0.00 - 0.85 K/uL    Eos (Absolute) 0.16 0.00 - 0.51 K/uL    Baso (Absolute) 0.01 0.00 - 0.12 K/uL    Immature Granulocytes (abs) 0.10 0.00 - 0.11 K/uL    NRBC (Absolute) 0.00 K/uL   Complete Metabolic Panel   Result Value Ref Range    Sodium 139 135 - 145 mmol/L    Potassium 3.2 (L) 3.6 - 5.5 mmol/L    Chloride 100 96 - 112 mmol/L    Co2 25 20 - 33 mmol/L    Anion Gap 14.0 7.0 - 16.0    Glucose 69 65 - 99 mg/dL    Bun 25 (H) 8 - 22 mg/dL    Creatinine 0.73 0.50 - 1.40 mg/dL    Calcium 8.9 8.5 - 10.5 mg/dL    Correct Calcium 9.5 8.5 - 10.5 mg/dL    AST(SGOT) 16 12 - 45 U/L    ALT(SGPT) 12 2 - 50 U/L    Alkaline Phosphatase 87 30 - 99 U/L    Total Bilirubin 0.2 0.1 - 1.5 mg/dL    Albumin 3.3 3.2 - 4.9 g/dL    Total Protein 6.4 6.0 - 8.2 g/dL    Globulin 3.1 1.9 - 3.5 g/dL    A-G Ratio 1.1 g/dL   Urinalysis    Specimen: Urine   Result Value Ref Range    Color Yellow     Character Clear     Specific Gravity 1.014 <1.035    Ph 7.0 5.0 - 8.0    Glucose 100 (A) Negative mg/dL    Ketones Negative Negative mg/dL    Protein 100 (A) Negative mg/dL    Bilirubin Negative Negative    Urobilinogen, Urine 0.2 Negative    Nitrite Negative Negative    Leukocyte Esterase Negative Negative    Occult Blood Trace (A) Negative    Micro Urine Req Microscopic    Procalcitonin   Result Value Ref Range    Procalcitonin <0.05 <0.25 ng/mL   Magnesium   Result Value Ref Range    Magnesium 1.6 1.5 - 2.5 mg/dL   DIAGNOSTIC ALCOHOL   Result Value Ref Range    Diagnostic Alcohol <10.1 <10.1 mg/dL   ESTIMATED GFR   Result Value Ref Range    GFR (CKD-EPI)  104 >60 mL/min/1.73 m 2   URINE MICROSCOPIC (W/UA)   Result Value Ref Range    WBC 0-2 (A) /hpf    RBC 2-5 (A) /hpf    Bacteria Negative None /hpf    Epithelial Cells Negative /hpf    Hyaline Cast 0-2 /lpf   EKG   Result Value Ref Range    Report       [facility] Emergency Dept.    Test Date:  2024  Pt Name:    BAILEE JACOBSON               Department: ER  MRN:        [id number]                      Room:        15  Gender:     Male                         Technician: [id number]  :        1965                   Requested By:ER TRIAGE PROTOCOL  Order #:    [id number]                    Reading MD:    Measurements  Intervals                                Axis  Rate:       90                           P:          63  LA:         183                          QRS:        56  QRSD:       97                           T:          56  QT:         375  QTc:        459    Interpretive Statements  Sinus rhythm  Minimal ST elevation, anterior leads  Compared to ECG 2024 18:21:02  ST (T wave) deviation now present     POC CoV-2, FLU A/B, RSV by PCR   Result Value Ref Range    POC Influenza A RNA, PCR Negative Negative    POC Influenza B RNA, PCR Negative Negative    POC RSV, by PCR Negative Negative    POC SARS-CoV-2, PCR NotDetected NotDetected   POCT glucose device results   Result Value Ref Range    POC Glucose, Blood 145 (H) 65 - 99 mg/dL   POCT glucose device results   Result Value Ref Range    POC Glucose, Blood 104 (H) 65 - 99 mg/dL   POCT glucose device results   Result Value Ref Range    POC Glucose, Blood 96 65 - 99 mg/dL   POCT glucose device results   Result Value Ref Range    POC Glucose, Blood 72 65 - 99 mg/dL   POCT glucose device results   Result Value Ref Range    POC Glucose, Blood 65 65 - 99 mg/dL   POCT glucose device results   Result Value Ref Range    POC Glucose, Blood 206 (H) 65 - 99 mg/dL   POCT glucose device results   Result Value Ref Range    POC Glucose, Blood 266 (H)  65 - 99 mg/dL   POCT glucose device results   Result Value Ref Range    POC Glucose, Blood 147 (H) 65 - 99 mg/dL       I have independently interpreted this EKG    RADIOLOGY/PROCEDURES   I have independently interpreted the diagnostic imaging associated with this visit and am waiting the final reading from the radiologist.   My preliminary interpretation is as follows:   Chest x-ray: Normal lung fields, no consolidation or effusion    Radiologist interpretation:  DX-CHEST-PORTABLE (1 VIEW)   Final Result         1. No acute cardiopulmonary abnormalities are identified.      CT-HEAD W/O   Final Result         1. No acute intracranial abnormality. No evidence of acute intracranial hemorrhage or mass lesion.                               COURSE & MEDICAL DECISION MAKING    ASSESSMENT, COURSE AND PLAN  Care Narrative:   1:10 AM seen evaluated at bedside.  Somnolent but arousable on answers most questions appropriately although no recall of the events of the evening.  Denies any history of seizure.  States he does not feel well.  Becomes diaphoretic during this discussion and more somnolent.  Repeat blood glucose on arrival was 72, again now at 32.  Add 1 amp of D50.  Grossly nonfocal, no history for trauma but otherwise unexplained ongoing somnolence, altered mental status and last this is postictal from seizure or seizure-like activity prior to arrival.  This would not be unusual for hypoglycemic episode.  Add labs, CT head.    No leukocytosis, left shift or bandemia.  Stable anemia.  No electrolyte derangement.  Mild nonspecific elevation of lactic acid 2.9.  Alcohol negative.    Repeat blood glucose 145 will every 15 minutes.    Chest x-ray within normal limits.  CT head within normal limits.    Blood glucose downtrending 104, 96, 72, 65 over 1-hour, repeat D50, add D10 drip.      ADDITIONAL PROBLEMS MANAGED  Insulin-dependent diabetic  CKD  Chronic pain    DISPOSITION AND DISCUSSIONS  ED evaluation for altered  mental status is unrevealing although given hypoglycemia suspect postictal from seizure.  Patient denies history of the same.  CT head negative.  Grossly nonfocal.  He does take routine narcotics but administered by nursing staff doubt overdose, pupils are not pinpoint patient is arousable.  When he remained quite somnolent following glucose correction he did get small dose of Narcan without improvement.  Has since much improved from initial evaluation, conversive, arousable and follows commands more briskly.  More suggestive, again, of postictal episode which has resolved.  However patient's blood glucose continue to trend down despite initial glucagon, D10 and then D50.  He is now on a D10 drip and will be hospitalized for further evaluation and treatment, close blood glucose monitoring.  Cannot exclude med error at sending facility.  No evidence for sepsis, infection.    I have discussed management of the patient with the following physicians and MICHELLE's:    4:38 AM [facility] IM Resident is aware of the patient and agreeable consultation.    Discussion of management with other QHP or appropriate source(s): None     CRITICAL CARE  The very real possibilty of a deterioration of this patient's condition required the highest level of my preparedness for sudden, emergent intervention.  I provided critical care services, which included medication orders, frequent reevaluations of the patient's condition and response to treatment, ordering and reviewing test results, and discussing the case with various consultants.  The critical care time associated with the care of the patient was 45 minutes. Review chart for interventions. This time is exclusive of any other billable procedures.       FINAL DIAGNOSIS  1. Hypoglycemia    2. Altered mental status, unspecified altered mental status type    3. Seizure (HCC)         Electronically signed by: Stephanie Urban D.O., 8/8/2024 1:10 AM

## 2024-08-08 NOTE — ASSESSMENT & PLAN NOTE
Patient with Hx of diabetes controled with insulin, due to hypoglycemia episode will hold insulin and will continue on insulin drip.

## 2024-08-08 NOTE — H&P
[facility] Internal Medicine History & Physical Note    Date of Service  8/8/2024    Attending: Anatoliy Eagle D.o.  Senior Resident: Jose Atkinson    Contact Number: [phone]    Primary Care Physician  DIANA Tariq    Consultants  IM    Code Status  Full Code    Chief Complaint  Chief Complaint   Patient presents with    Low Blood Sugar     Pt BIBA from [facility] for low blood sugar. Facility checked BGL at 60, was given insulin, blood sugar read error, per facility pt had a seizure and became hypoxic, CPR was initiated by staff. Upon EMS arrival given Glucagon and D10 and BGL to 72 currently. Pt is A&Ox3, requiring 4 L NC.        History of Presenting Illness (HPI):   Christoph Taylor is a 59 y.o. male who presented 8/8/2024 with past medical Hx of DMII on insulin, came to the ED department after presenting a hypoglycemia episode [facility], after these the patient started presenting a seizure episode witness by rehab facility staff in which the patient lost consciousness and started shaking his hands, after this episode the patient was confused and poorly responsive.     The patient was evaluated in the ED and was started on IV fluids with D5% and D10%, after this the patient glucose levels went back to normal and mental status of the patient also went back to normal, no focal deficits. Patient didn't have nausea or vomiting. Denies fever, chills, trauma.     In the ED patient labs showing: cbc with a Hb of 11.7, lactic acid of 2.9, CMP BUN of 25, K of 3.2, Glucose >140 mg/dl     CT head normal.     Patient placed in a D10% drip.     I discussed the plan of care with patient.    Review of Systems  Review of Systems   Constitutional:  Negative for chills, fever, malaise/fatigue and weight loss.   HENT:  Negative for hearing loss and tinnitus.    Eyes:  Negative for blurred vision, double vision and photophobia.   Respiratory:  Negative for  cough, hemoptysis and sputum production.    Cardiovascular:  Negative for chest pain, palpitations, orthopnea and claudication.   Gastrointestinal:  Negative for abdominal pain, heartburn, nausea and vomiting.   Genitourinary:  Negative for dysuria, frequency, hematuria and urgency.   Musculoskeletal:  Negative for myalgias and neck pain.   Skin:  Negative for rash.   Neurological:  Positive for seizures and loss of consciousness. Negative for dizziness, tingling, tremors and headaches.   Endo/Heme/Allergies:  Negative for environmental allergies. Does not bruise/bleed easily.   Psychiatric/Behavioral:  Negative for depression and suicidal ideas.        Past Medical History   has a past medical history of Abnormal electrocardiogram (ECG) (EKG) (11/8/2021), KRISTAL (acute kidney injury) ([facility]) (11/8/2021), Chest pain in adult (11/8/2021), CKD (chronic kidney disease) stage 3, GFR 30-59 ml/min (11/7/2021), Diabetes ([facility]), Diabetic ketoacidosis without coma associated with type 2 diabetes mellitus ([facility]) (11/7/2021), Diarrhea (3/18/2022), DKA, type 1, not at goal ([facility]) (7/28/2022), Esophagitis (7/28/2022), [place] (hard of hearing), and Hypercholesteremia.    Surgical History   has a past surgical history that includes other; pr upper gi endoscopy,diagnosis (N/A, 3/14/2023); finger or hand incision and drainage (Right, 6/12/2023); cervical disk and fusion anterior (N/A, 6/4/2024); fusion, spine, lumbar, plif (N/A, 6/7/2024); and lumbar laminectomy diskectomy (N/A, 6/7/2024).     Family History  family history includes Heart Disease in his father.   Family history reviewed with patient.       Allergies  Allergies   Allergen Reactions    Ketamine      aggressive       Medications  Prior to Admission Medications   Prescriptions Last Dose Informant Patient Reported? Taking?   Venlafaxine HCl 75 MG TABLET SR 24 HR 8/7/2024 at 0700 MAR from Other Facility Yes Yes   Sig: Take 75 mg by mouth every day.   acetaminophen (TYLENOL) 325 MG  Tab 8/6/2024 at 1639 MAR from Other Facility Yes Yes   Sig: Take 650 mg by mouth every four hours as needed.   aspirin 81 MG EC tablet 8/7/2024 at 0700 MAR from Other Facility Yes Yes   Sig: Take 81 mg by mouth every day.   bisacodyl (DULCOLAX) 10 MG Suppos PRN at PRN MAR from Other Facility Yes Yes   Sig: Insert 10 mg into the rectum 1 time a day as needed. 4th day if milk of mag is ineffective   diclofenac sodium (VOLTAREN) 1 % Gel 8/7/2024 at 2000 MAR from Other Facility Yes Yes   Sig: Apply 2-4 g topically 2 times a day. To right shoulder   insulin GLARGINE (LANTUS,SEMGLEE) 100 UNIT/ML Solution 8/7/2024 at 2000 MAR from Other Facility No Yes   Sig: Inject 15 Units under the skin 2 times a day.   insulin lispro 100 UNIT/ML INJ 8/7/2024 at 1630 MAR from Other Facility Yes Yes   Sig: Inject 2-10 Units under the skin 2 times a day. Per sliding scale:  151 - 200 = 2 units  201 - 250 = 4 units  251 - 300 = 6 units  301 - 350 = 8 units  351 - 400 = 10 units   loperamide (IMODIUM) 2 MG Cap 8/1/2024 at 1117 MAR from Other Facility Yes Yes   Sig: Take 4 mg by mouth every 6 hours as needed for Diarrhea.   magnesium hydroxide (MILK OF MAGNESIA) 400 MG/5ML Suspension PRN at PRN MAR from Other Facility Yes Yes   Sig: Take 30 mL by mouth every 72 hours as needed. Indications: Constipation   oxyCODONE immediate-release (ROXICODONE) 5 MG Tab 8/7/2024 at 2127 MAR from Other Facility Yes Yes   Sig: Take 5 mg by mouth every four hours as needed for Severe Pain.   sodium phosphate (FLEET) 7-19 GM/118ML Enema PRN at PRN MAR from Other Facility Yes Yes   Sig: Insert 1 Each into the rectum one time as needed. On 5th day if Dulcolax ineffective      Facility-Administered Medications: None       Physical Exam  Temp:  [36.5 °C (97.7 °F)] 36.5 °C (97.7 °F)  Pulse:  [81-89] 83  Resp:  [11-18] 18  BP: (128-154)/(75-88) 147/87  SpO2:  [85 %-99 %] 93 %  Blood Pressure: (!) 147/87   Temperature: 36.5 °C (97.7 °F)   Pulse: 83   Respiration: 18  "  Pulse Oximetry: 93 %       Physical Exam  Constitutional:       General: He is not in acute distress.     Appearance: He is not ill-appearing or toxic-appearing.   HENT:      Mouth/Throat:      Pharynx: No oropharyngeal exudate or posterior oropharyngeal erythema.   Cardiovascular:      Rate and Rhythm: Normal rate.      Heart sounds: No murmur heard.     No friction rub. No gallop.   Pulmonary:      Effort: No respiratory distress.      Breath sounds: No stridor. No wheezing or rhonchi.   Abdominal:      General: There is no distension.      Palpations: There is no mass.      Tenderness: There is no abdominal tenderness.   Musculoskeletal:         General: No swelling, tenderness or deformity.      Cervical back: No rigidity.   Lymphadenopathy:      Cervical: No cervical adenopathy.   Skin:     Capillary Refill: Capillary refill takes less than 2 seconds.      Coloration: Skin is not jaundiced.      Findings: No bruising.   Neurological:      General: No focal deficit present.      Mental Status: He is oriented to person, place, and time.         Laboratory:  Recent Labs     08/08/24  0037   WBC 5.9   RBC 4.37*   HEMOGLOBIN 11.7*   HEMATOCRIT 36.6*   MCV 83.8   MCH 26.8*   MCHC 32.0*   RDW 42.5   PLATELETCT 215   MPV 8.7*     Recent Labs     08/08/24  0037   SODIUM 139   POTASSIUM 3.2*   CHLORIDE 100   CO2 25   GLUCOSE 69   BUN 25*   CREATININE 0.73   CALCIUM 8.9     Recent Labs     08/08/24  0037   ALTSGPT 12   ASTSGOT 16   ALKPHOSPHAT 87   TBILIRUBIN 0.2   GLUCOSE 69         No results for input(s): \"NTPROBNP\" in the last 72 hours.      No results for input(s): \"TROPONINT\" in the last 72 hours.    Imaging:  DX-CHEST-PORTABLE (1 VIEW)   Final Result         1. No acute cardiopulmonary abnormalities are identified.      CT-HEAD W/O   Final Result         1. No acute intracranial abnormality. No evidence of acute intracranial hemorrhage or mass lesion.                               X-Ray:  I have personally " reviewed the images and compared with prior images.  EKG:  I have personally reviewed the images and compared with prior images.    Assessment/Plan:  Problem Representation:     Mr. Hawthorne is a 60 yo male who came after having an episode of hypoglycemia due to insulin and a seizure, patient back to baseline after giving D5, D10 IV fluids,     * Seizure (HCC)- (present on admission)  Assessment & Plan  No previous seizures Hx, most likely secondary to hypoglycemia episode, patient at baseline  -admission to floor to continue monitoring   -if presents new seizure can consider EEG and neurology evaluation   -fall precautions   -seizures precautions     Hypoglycemia- (present on admission)  Assessment & Plan  Most likely secondary to insulin dose provided at the rehab facility, improved after having dextrose IV. Currently glucose >140 mg/dl  -continue to monitor glucose levels   -continue D10% drip   -hold insulin until patient is more stable     Hypokalemia  Assessment & Plan  Patient K levels at 3.2, will correct   -continue to monitor   -giving oral potasium 40 mEq once     Type 2 diabetes mellitus (HCC)  Assessment & Plan  Patient with Hx of diabetes controled with insulin, due to hypoglycemia episode will hold insulin and will continue on insulin drip.     Anemia- (present on admission)  Assessment & Plan  Chronic, patient current Hb 11.7  -continue to monitor     Thoracic back pain- (present on admission)  Assessment & Plan  Chronic, patient takes oxycodone ad needed   -resume oxycodone PRN   -bowel protocol         VTE prophylaxis: enoxaparin ppx

## 2024-08-08 NOTE — ASSESSMENT & PLAN NOTE
8/8 Chronic, patient takes oxycodone ad needed   -resume oxycodone PRN   -bowel protocol     8/9 reports recent CPR/chest compressions  Mild soreness  - EKG SR  Monitor    8/10 min chest discomfort described as soreness  IA interval noted at 0.2, will follow-up EKG  Echocardiogram in July with EF of 61%  Monitor  Continue telemetry monitoring at this time    8/11 resolved  EKG reviewed, borderline prolonged IA interval, asymptomatic  Okay to DC telemetry monitoring

## 2024-08-08 NOTE — ASSESSMENT & PLAN NOTE
>>ASSESSMENT AND PLAN FOR SEIZURE (HCC) WRITTEN ON 8/8/2024  5:10 AM BY DENVER STACY M.D.    No previous seizures Hx, most likely secondary to hypoglycemia episode, patient at baseline  -admission to floor to continue monitoring   -if presents new seizure can consider EEG and neurology evaluation   -fall precautions   -seizures precautions

## 2024-08-08 NOTE — ASSESSMENT & PLAN NOTE
8/8 Patient K levels at 3.2, will correct   -continue to monitor   -giving oral potasium 40 mEq once     8/9 improved, k 4.1, monitor

## 2024-08-08 NOTE — ASSESSMENT & PLAN NOTE
8/7 Most likely secondary to insulin dose provided at the rehab facility, improved after having dextrose IV. Currently glucose >140 mg/dl  -continue to monitor glucose levels   -continue D10% drip   -hold insulin until patient is more stable     8/8 hyperglycemia on d10, transition to nss  Accucheck q6, monitor  On diabetic diet  F/u A1c    8/9 A1c 9.9, uncontrolled  Improving, add lantus 5 u,cont ssi    8/10 hyperglycemia, blood sugar in the 200s to 300s  To adjust Lantus  Continue sliding scale insulin  Now noted to have UTI    8/11 improving, will increase Lantus, likely medically cleared in the a.m.,  to follow-up with [facility] for placement

## 2024-08-08 NOTE — ASSESSMENT & PLAN NOTE
No previous seizures Hx, most likely secondary to hypoglycemia episode, patient at baseline  -admission to floor to continue monitoring   -if presents new seizure can consider EEG and neurology evaluation   -fall precautions   -seizures precautions     8/9 cont to monitor, no seizure activity noted  Neurochecks, likely dc in 24-48 hours, if bs controlled    8/10 okay to DC telemetry monitoring  Sinus rhythm

## 2024-08-08 NOTE — ED TRIAGE NOTES
Chief Complaint   Patient presents with    Low Blood Sugar     Pt BIBA from [facility] for low blood sugar. Facility checked BGL at 60, was given insulin, blood sugar read error, per facility pt had a seizure and became hypoxic, CPR was initiated by staff. Upon EMS arrival given Glucagon and D10 and BGL to 72 currently. Pt is A&Ox3, requiring 4 L NC.

## 2024-08-08 NOTE — PROGRESS NOTES
Pt admitted this am for hypoglycemia with h/o T2DM and seizure activity.  WAs on D10 infusion with hyperglycemia.  Now on diabetic diet.    Awake and alert.  F/u A1c  Neurochecks  Seizure precautions  Change to nss, accucheck qac and hs    Cont current management

## 2024-08-09 PROBLEM — R07.9 CHEST PAIN: Status: ACTIVE | Noted: 2022-10-05

## 2024-08-09 PROBLEM — M48.061 LUMBAR STENOSIS: Status: ACTIVE | Noted: 2024-08-09

## 2024-08-09 LAB
ALBUMIN SERPL BCP-MCNC: 3.2 G/DL (ref 3.2–4.9)
ALBUMIN/GLOB SERPL: 1.2 G/DL
ALP SERPL-CCNC: 84 U/L (ref 30–99)
ALT SERPL-CCNC: 10 U/L (ref 2–50)
ANION GAP SERPL CALC-SCNC: 8 MMOL/L (ref 7–16)
AST SERPL-CCNC: 10 U/L (ref 12–45)
BASOPHILS # BLD AUTO: 0.1 % (ref 0–1.8)
BASOPHILS # BLD: 0.01 K/UL (ref 0–0.12)
BILIRUB SERPL-MCNC: 0.4 MG/DL (ref 0.1–1.5)
BUN SERPL-MCNC: 18 MG/DL (ref 8–22)
CALCIUM ALBUM COR SERPL-MCNC: 8.9 MG/DL (ref 8.5–10.5)
CALCIUM SERPL-MCNC: 8.3 MG/DL (ref 8.5–10.5)
CHLORIDE SERPL-SCNC: 102 MMOL/L (ref 96–112)
CO2 SERPL-SCNC: 27 MMOL/L (ref 20–33)
CREAT SERPL-MCNC: 0.52 MG/DL (ref 0.5–1.4)
EKG IMPRESSION: NORMAL
EOSINOPHIL # BLD AUTO: 0.1 K/UL (ref 0–0.51)
EOSINOPHIL NFR BLD: 1.5 % (ref 0–6.9)
ERYTHROCYTE [DISTWIDTH] IN BLOOD BY AUTOMATED COUNT: 42.5 FL (ref 35.9–50)
GFR SERPLBLD CREATININE-BSD FMLA CKD-EPI: 115 ML/MIN/1.73 M 2
GLOBULIN SER CALC-MCNC: 2.7 G/DL (ref 1.9–3.5)
GLUCOSE BLD STRIP.AUTO-MCNC: 175 MG/DL (ref 65–99)
GLUCOSE BLD STRIP.AUTO-MCNC: 216 MG/DL (ref 65–99)
GLUCOSE BLD STRIP.AUTO-MCNC: 293 MG/DL (ref 65–99)
GLUCOSE BLD STRIP.AUTO-MCNC: 344 MG/DL (ref 65–99)
GLUCOSE SERPL-MCNC: 245 MG/DL (ref 65–99)
HCT VFR BLD AUTO: 33.1 % (ref 42–52)
HGB BLD-MCNC: 10.8 G/DL (ref 14–18)
IMM GRANULOCYTES # BLD AUTO: 0.03 K/UL (ref 0–0.11)
IMM GRANULOCYTES NFR BLD AUTO: 0.4 % (ref 0–0.9)
LYMPHOCYTES # BLD AUTO: 0.92 K/UL (ref 1–4.8)
LYMPHOCYTES NFR BLD: 13.5 % (ref 22–41)
MCH RBC QN AUTO: 27.3 PG (ref 27–33)
MCHC RBC AUTO-ENTMCNC: 32.6 G/DL (ref 32.3–36.5)
MCV RBC AUTO: 83.6 FL (ref 81.4–97.8)
MONOCYTES # BLD AUTO: 0.33 K/UL (ref 0–0.85)
MONOCYTES NFR BLD AUTO: 4.8 % (ref 0–13.4)
NEUTROPHILS # BLD AUTO: 5.45 K/UL (ref 1.82–7.42)
NEUTROPHILS NFR BLD: 79.7 % (ref 44–72)
NRBC # BLD AUTO: 0 K/UL
NRBC BLD-RTO: 0 /100 WBC (ref 0–0.2)
PLATELET # BLD AUTO: 168 K/UL (ref 164–446)
PMV BLD AUTO: 8.7 FL (ref 9–12.9)
POTASSIUM SERPL-SCNC: 4.1 MMOL/L (ref 3.6–5.5)
PROT SERPL-MCNC: 5.9 G/DL (ref 6–8.2)
RBC # BLD AUTO: 3.96 M/UL (ref 4.7–6.1)
SODIUM SERPL-SCNC: 137 MMOL/L (ref 135–145)
WBC # BLD AUTO: 6.8 K/UL (ref 4.8–10.8)

## 2024-08-09 PROCEDURE — 80053 COMPREHEN METABOLIC PANEL: CPT

## 2024-08-09 PROCEDURE — 770020 HCHG ROOM/CARE - TELE (206)

## 2024-08-09 PROCEDURE — 700102 HCHG RX REV CODE 250 W/ 637 OVERRIDE(OP)

## 2024-08-09 PROCEDURE — 700102 HCHG RX REV CODE 250 W/ 637 OVERRIDE(OP): Performed by: INTERNAL MEDICINE

## 2024-08-09 PROCEDURE — 82962 GLUCOSE BLOOD TEST: CPT

## 2024-08-09 PROCEDURE — 85025 COMPLETE CBC W/AUTO DIFF WBC: CPT

## 2024-08-09 PROCEDURE — 36415 COLL VENOUS BLD VENIPUNCTURE: CPT

## 2024-08-09 PROCEDURE — A9270 NON-COVERED ITEM OR SERVICE: HCPCS

## 2024-08-09 PROCEDURE — 700111 HCHG RX REV CODE 636 W/ 250 OVERRIDE (IP): Mod: JZ

## 2024-08-09 PROCEDURE — 99233 SBSQ HOSP IP/OBS HIGH 50: CPT | Performed by: INTERNAL MEDICINE

## 2024-08-09 PROCEDURE — 700105 HCHG RX REV CODE 258: Performed by: INTERNAL MEDICINE

## 2024-08-09 RX ADMIN — INSULIN HUMAN 1 UNITS: 100 INJECTION, SOLUTION PARENTERAL at 09:17

## 2024-08-09 RX ADMIN — INSULIN HUMAN 2 UNITS: 100 INJECTION, SOLUTION PARENTERAL at 14:17

## 2024-08-09 RX ADMIN — INSULIN HUMAN 3 UNITS: 100 INJECTION, SOLUTION PARENTERAL at 17:42

## 2024-08-09 RX ADMIN — OXYCODONE HYDROCHLORIDE 5 MG: 5 TABLET ORAL at 17:30

## 2024-08-09 RX ADMIN — INSULIN GLARGINE-YFGN 5 UNITS: 100 INJECTION, SOLUTION SUBCUTANEOUS at 17:42

## 2024-08-09 RX ADMIN — SODIUM CHLORIDE: 9 INJECTION, SOLUTION INTRAVENOUS at 06:13

## 2024-08-09 RX ADMIN — INSULIN HUMAN 4 UNITS: 100 INJECTION, SOLUTION PARENTERAL at 21:50

## 2024-08-09 RX ADMIN — VENLAFAXINE HYDROCHLORIDE 75 MG: 75 CAPSULE, EXTENDED RELEASE ORAL at 04:49

## 2024-08-09 RX ADMIN — OXYCODONE HYDROCHLORIDE 5 MG: 5 TABLET ORAL at 09:16

## 2024-08-09 RX ADMIN — ENOXAPARIN SODIUM 40 MG: 100 INJECTION SUBCUTANEOUS at 17:30

## 2024-08-09 RX ADMIN — ASPIRIN 81 MG: 81 TABLET, COATED ORAL at 04:49

## 2024-08-09 ASSESSMENT — ENCOUNTER SYMPTOMS
CHILLS: 0
COUGH: 0
DIZZINESS: 0
BACK PAIN: 1
VOMITING: 0
WEAKNESS: 1
DEPRESSION: 0
FOCAL WEAKNESS: 0
NAUSEA: 0
SHORTNESS OF BREATH: 0
FLANK PAIN: 0
LOSS OF CONSCIOUSNESS: 1
MYALGIAS: 1
SENSORY CHANGE: 0
SEIZURES: 1
MEMORY LOSS: 0
NERVOUS/ANXIOUS: 0
PALPITATIONS: 0
ABDOMINAL PAIN: 0
SPEECH CHANGE: 0
HEADACHES: 0
DIAPHORESIS: 0
HEARTBURN: 0
FEVER: 0

## 2024-08-09 ASSESSMENT — FIBROSIS 4 INDEX
FIB4 SCORE: 1.129384878631564047
FIB4 SCORE: 1.129384878631564047

## 2024-08-09 ASSESSMENT — PAIN DESCRIPTION - PAIN TYPE
TYPE: ACUTE PAIN

## 2024-08-09 NOTE — DISCHARGE PLANNING
JESSIE received a call from [facility] to see if pt is close to  due to the need of submitting insurance auth.   RN CM aware.     0928  JESSIE spoke with MD to see when medical clearance is expected. Said that possibly Monday, pt should be medically clear.  JESSIE asked Brian Reyez to submit insurance auth today.

## 2024-08-09 NOTE — DIETARY
Nutrition Note: Low BMI noted on admit screen    Day 1 of admit.  Christoph Taylor is a 59 y.o. male admitted for seizure.  Principal Problem:    Seizure (HCC) (POA: Yes)  Active Problems:    Thoracic back pain (POA: Yes)    Anemia (POA: Yes)    Hypoglycemia (POA: Yes)    Type 2 diabetes mellitus (HCC) (POA: Unknown)    Hypokalemia (POA: Unknown)    Seizure-like activity (HCC) (POA: Yes)  Resolved Problems:    * No resolved hospital problems. *    Assessment:  Height: 182.9 cm (6')  Weight: 62 kg (136 lb 11 oz) via bed scale - pt does not think this wt is accurate  Body mass index is 18.54 kg/m².  Wt Readings from Last 15 Encounters:   08/09/24 62 kg (136 lb 11 oz)   07/26/24 71.1 kg (156 lb 12 oz)   07/21/24 79.4 kg (175 lb)   06/15/24 76.3 kg (168 lb 3.4 oz)   06/01/24 73.5 kg (162 lb 0.6 oz)   05/21/24 74 kg (163 lb 2.3 oz)   05/11/24 80.7 kg (177 lb 14.6 oz)   05/06/24 81.6 kg (180 lb)   02/26/24 81.2 kg (179 lb)   02/25/24 81.2 kg (179 lb 0.2 oz)   01/28/24 71.7 kg (158 lb 1.1 oz)   08/31/23 77.3 kg (170 lb 6.4 oz)   08/06/23 80.5 kg (177 lb 7.5 oz)   08/01/23 83.9 kg (185 lb)   06/16/23 83.2 kg (183 lb 6.8 oz)     Diet/Intake: [facility] diet ordered    Evaluation:   Pt reports eating 3 meals per day at home; breakfast tray at bedside 100% consumed. He denies changes to weight or appetite. States his usual wt is around 170# and does not think his current wt in the chart is accurate. PO intakes establishing while admitted though suspect pt currently meeting nutritional needs.    Recommendations/Interventions/Plan:    Encourage intake of meals  Document intake of all PO as % taken in ADL's to provide interdisciplinary communication across all shifts.   Monitor weight - requested re-weigh from RN today  Nutrition rep will continue to see patient for ongoing meal and snack preferences.     RD following

## 2024-08-09 NOTE — ASSESSMENT & PLAN NOTE
8/9 s/p surgery in May, follows up with Dr. Pulido  Neurosurgery  PT OT evaluation  Patient from [facility] [place]  For outpatient follow-up as scheduled    Has follow-up appointment with Dr. Pulido on Monday, August 12, vault placed to advise Dr. Pulido of patient's inpatient status  Will likely need to reschedule outpatient appointment    8/10 pain controlled, continue therapy  Urine culture and influenza panel negative on admission  Now with positive urinalysis

## 2024-08-09 NOTE — CARE PLAN
The patient is Stable - Low risk of patient condition declining or worsening    Shift Goals  Clinical Goals: neuro checks, monitor labs, glucose control  Patient Goals: rest  Family Goals: vicente    Progress made toward(s) clinical / shift goals:    Problem: Hemodynamics  Goal: Patient's hemodynamics, fluid balance and neurologic status will be stable or improve  Description: Target End Date:  Prior to discharge or change in level of care    Document on Assessment and I/O flowsheet templates    1.  Monitor vital signs, pulse oximetry and cardiac monitor per provider order and/or policy  2.  Maintain blood pressure per provider order  3.  Hemodynamic monitoring per provider order  4.  Manage IV fluids and IV infusions  5.  Monitor intake and output  6.  Daily weights per unit policy or provider order  7.  Assess peripheral pulses and capillary refill  8.  Assess color and body temperature  9.  Position patient for maximum circulation/cardiac output  10. Monitor for signs/symptoms of excessive bleeding  11. Assess mental status, restlessness and changes in level of consciousness  12. Monitor temperature and report fever or hypothermia to provider immediately. Consideration of targeted temperature management.  Outcome: Progressing  Note: Monitored vitals, monitored rhythms, updated MD on tele monitor findings.      Problem: Nutrition  Goal: Patient's nutritional and fluid intake will be adequate or improve  Description: Target End Date:  Prior to discharge or change in level of care    Document on I/O flowsheet    1.  Monitor nutritional intake  2.  Monitor weight per provider order  3.  Assess patient's ability to take oral nutrition  4.  Collaborate with Speech Therapy, Dietitian and interdisciplinary team for appropriate feeding and fluid intake  5.  Assist with feeding  Outcome: Progressing  Flowsheets (Taken 8/8/2024 2000)  Oral Nutrition:   Dinner   Between % Consumed     Problem: Knowledge Deficit -  Diabetes  Goal: Patient will demonstrate knowledge of insulin injection, symptoms, and treatment of hypoglycemia and diet prior to discharge  Description: Target End Date:  1-3 days or as soon as patient condition allows    1.  Explain disease process, medications and methods used in treating diabetes  2.  Explain signs and symptoms of hyper/hypoglycemia  3.  Educate about lifestyle changes including exercise, diet (carb counting) and sick day management  4.  Educate importance of regular vision appointments, regular foot care and monitoring of skin lesions  5.  Educate patient and family/caregiver on diabetes management and proper use of equipment for testing (syringe, pens and site rotation, glucometer, test strips)  6.  Collaborate with Diabetes Educator  7.  Assess patient and family/caregiver skills with insulin pens and finger sticks. Document level of understanding in patient education  Outcome: Progressing  Note: Assessed patients knowledge on signs/sxs of hyper/hypoglycemia, explained importance of FSBG and mediations       Patient is not progressing towards the following goals:      Problem: Bowel Elimination  Goal: Establish and maintain regular bowel function  Description: Target End Date:  Prior to discharge or change in level of care    1.   Note date of last BM  2.   Educate about diet, fluid intake, medication and activity to promote bowel function  3.   Educate signs and symptoms of constipation and interventions to implement  4.   Pharmacologic bowel management per provider order  5.   Regular toileting schedule  6.   Upright position for toileting  7.   High fiber diet  8.   Encourage hydration  9.   Collaborate with Clinical Dietician  10. Care and maintenance of ostomy if applicable  Outcome: Not Progressing  Note:     Patient had multiple episodes of incontinence, educated patient on importance of notifying staff for help to promote hygiene and prevent skin breakdown, patient verbalized  understanding. Implemented bed pan and scheduled toileting.

## 2024-08-09 NOTE — PROGRESS NOTES
Telemetry Report:      Rhythm:  SR     Heart Rate: 76-90   Ectopy: PVCs, BIG, ST elevation-MD aware            NM:  0.15           QRS:       0.06  QT:   0.34        Per Telestrip from Monitor Room

## 2024-08-09 NOTE — THERAPY
Physical Therapy Contact Note    Patient Name: Christoph Taylor  Age:  59 y.o., Sex:  male  Medical Record #: [id number]  Today's Date: 8/8/2024    PT consult received, eval attempted; getting cleaned up; will return when able;     Vanita SHEETS, PT,  [phone]

## 2024-08-09 NOTE — PROGRESS NOTES
Monitors called stating patient has ST elevation. Spoke with Day RN who stated this is a new finding. STAT EKG ordered per protocol, YESENIA Henderson notified. Patient states he has CP but it is from the chest compressions and has not changed his hospital stay. No SOB.  BP: 131/81. RR: 20. SPO2 94% on 1L. HR: 84.

## 2024-08-09 NOTE — PROGRESS NOTES
[facility] Medicine Daily Progress Note    Date of Service  8/9/2024    Chief Complaint  Christoph Taylor is a 59 y.o. male admitted 8/8/2024 with hypoglycemia and seizure like episode.    Hospital Course    Christoph Taylor is a 59 y.o. male who presented 8/8/2024 with past medical Hx of DMII on insulin, came to the ED department after presenting a hypoglycemia episode after receiving an insulin dose at the rehab center, after these the patient started presenting a seizure episode witness by rehab facility staff in which the patient lost consciousness and started shaking his hands, after this episode the patient was confused and poorly responsive.      The patient was evaluated in the ED and was started on IV fluids with D5% and D10%, after this the patient glucose levels went back to normal and mental status of the patient also went back to normal, no focal deficits. Patient didn't have nausea or vomiting. Denies fever, chills, trauma.      In the ED patient labs showing: cbc with a Hb of 11.7, lactic acid of 2.9, CMP BUN of 25, K of 3.2, Glucose >140 mg/dl      CT head normal.      Patient placed in a D10% drip.       Interval Problem Update  8/9 improving blood sugar, blood sugar in the 200s  Patient denies nausea or headache  Lantus restarted  No seizure activity during this admission  Reports back and lower extremity pain, controlled  Skilled referral placed,  to assist    I have discussed this patient's plan of care and discharge plan at IDT rounds today with Case Management, Nursing, Nursing leadership, and other members of the IDT team.    Consultants/Specialty  none    Code Status  Full Code    Disposition  The patient is not medically cleared for discharge to home or a post-acute facility.      I have placed the appropriate orders for post-discharge needs.    Review of Systems  Review of Systems   Constitutional:  Negative for chills, diaphoresis, fever and malaise/fatigue.   HENT:   Negative for congestion and hearing loss.    Respiratory:  Negative for cough and shortness of breath.    Cardiovascular:  Negative for chest pain, palpitations and leg swelling.   Gastrointestinal:  Negative for abdominal pain, heartburn, nausea and vomiting.   Genitourinary:  Negative for dysuria and flank pain.   Musculoskeletal:  Positive for back pain and myalgias. Negative for joint pain.   Neurological:  Positive for seizures, loss of consciousness and weakness. Negative for dizziness, sensory change, speech change, focal weakness and headaches.   Psychiatric/Behavioral:  Negative for depression and memory loss. The patient is not nervous/anxious.         Physical Exam  Temp:  [36.5 °C (97.7 °F)-37 °C (98.6 °F)] 36.6 °C (97.9 °F)  Pulse:  [78-92] 87  Resp:  [14-18] 18  BP: ()/(50-92) 118/76  SpO2:  [90 %-98 %] 95 %    Physical Exam  Vitals and nursing note reviewed.   Constitutional:       General: He is not in acute distress.     Appearance: He is ill-appearing. He is not toxic-appearing or diaphoretic.   HENT:      Head: Normocephalic and atraumatic.      Nose: Nose normal.      Mouth/Throat:      Mouth: Mucous membranes are moist.   Eyes:      General:         Right eye: No discharge.         Left eye: No discharge.      Pupils: Pupils are equal, round, and reactive to light.   Neck:      Thyroid: No thyromegaly.      Vascular: No JVD.   Cardiovascular:      Rate and Rhythm: Normal rate.      Heart sounds: No murmur heard.  Pulmonary:      Effort: No respiratory distress.      Breath sounds: Normal breath sounds. No wheezing.   Abdominal:      General: Bowel sounds are normal. There is no distension.      Palpations: Abdomen is soft.      Tenderness: There is no abdominal tenderness.   Musculoskeletal:         General: No swelling or tenderness.      Cervical back: Neck supple.   Skin:     General: Skin is warm and dry.      Coloration: Skin is not pale.      Findings: No erythema or rash.    Neurological:      Mental Status: He is alert and oriented to person, place, and time.      Cranial Nerves: No cranial nerve deficit.      Sensory: No sensory deficit.      Motor: Weakness present.      Coordination: Coordination normal.   Psychiatric:         Behavior: Behavior normal.         Thought Content: Thought content normal.         Fluids    Intake/Output Summary (Last 24 hours) at 8/9/2024 1319  Last data filed at 8/9/2024 1100  Gross per 24 hour   Intake 600 ml   Output 1900 ml   Net -1300 ml       Laboratory  Recent Labs     08/08/24  0037 08/09/24  0516   WBC 5.9 6.8   RBC 4.37* 3.96*   HEMOGLOBIN 11.7* 10.8*   HEMATOCRIT 36.6* 33.1*   MCV 83.8 83.6   MCH 26.8* 27.3   MCHC 32.0* 32.6   RDW 42.5 42.5   PLATELETCT 215 168   MPV 8.7* 8.7*     Recent Labs     08/08/24  0037 08/09/24  0516   SODIUM 139 137   POTASSIUM 3.2* 4.1   CHLORIDE 100 102   CO2 25 27   GLUCOSE 69 245*   BUN 25* 18   CREATININE 0.73 0.52   CALCIUM 8.9 8.3*                   Imaging  DX-CHEST-PORTABLE (1 VIEW)   Final Result         1. No acute cardiopulmonary abnormalities are identified.      CT-HEAD W/O   Final Result         1. No acute intracranial abnormality. No evidence of acute intracranial hemorrhage or mass lesion.                                Assessment/Plan  * Seizure (HCC)- (present on admission)  Assessment & Plan  No previous seizures Hx, most likely secondary to hypoglycemia episode, patient at baseline  -admission to floor to continue monitoring   -if presents new seizure can consider EEG and neurology evaluation   -fall precautions   -seizures precautions     8/9 cont to monitor, no seizure activity noted  Neurochecks, likely dc in 24-48 hours, if bs controlled      Lumbar stenosis  Assessment & Plan  8/9 s/p surgery in May, follows up with Dr. Pulido  Neurosurgery  PT OT evaluation  Patient from [facility]  For outpatient follow-up as scheduled    Has follow-up appointment with Dr. Pulido on  Monday, August 12, vault placed to advise Dr. Pulido of patient's inpatient status  Will likely need to reschedule outpatient appointment    Hypokalemia  Assessment & Plan  8/8 Patient K levels at 3.2, will correct   -continue to monitor   -giving oral potasium 40 mEq once     8/9 improved, k 4.1, monitor    Type 2 diabetes mellitus (HCC)  Assessment & Plan  Patient with Hx of diabetes controled with insulin, due to hypoglycemia episode will hold insulin and will continue on insulin drip.     Hypoglycemia- (present on admission)  Assessment & Plan  8/7 Most likely secondary to insulin dose provided at the rehab facility, improved after having dextrose IV. Currently glucose >140 mg/dl  -continue to monitor glucose levels   -continue D10% drip   -hold insulin until patient is more stable     8/8 hyperglycemia on d10, transition to nss  Accucheck q6, monitor  On diabetic diet  F/u A1c    8/9 A1c 9.9, uncontrolled  Improving, add lantus 5 u,cont ssi    Anemia- (present on admission)  Assessment & Plan  Chronic, patient current Hb 11.7  -continue to monitor     Chest pain- (present on admission)  Assessment & Plan  8/8 Chronic, patient takes oxycodone ad needed   -resume oxycodone PRN   -bowel protocol     8/9 reports recent CPR/chest compressions  Mild soreness  - EKG SR  monitor         VTE prophylaxis:   SCDs/TEDs      I have performed a physical exam and reviewed and updated ROS and Plan today (8/9/2024). In review of yesterday's note (8/8/2024), there are no changes except as documented above.

## 2024-08-09 NOTE — DOCUMENTATION QUERY
[facility]                                                                       Query Response Note      PATIENT:               BAILEE JACOBSON  ACCT #:                  [id number]  MRN:                     [id number]  :                      1965  ADMIT DATE:       2024 12:33 AM  DISCH DATE:          RESPONDING  PROVIDER #:        [id number]           QUERY TEXT:    Lactic acid since arrival: 2.9 -> 1.5.  Treated IV NS infusion 75 mL/hr x1 on .    Please clarify the clinical/diagnostic relevance for the documented findings.    Note: If you agree with a diagnosis listed, please remember to include it in your concurrent daily documentation and onto the Discharge Summary.    The patient's clinical indicators include:  Lactic acid since arrival: 2.9 -> 1.5    Treatment: IV NS infusion 75 mL/hr x1    Risk factors: DM type 2 with hypoglycemia, seizure     Thank you,  Heike AVILEZN  Clinical   Connect via [employer]  Options provided:   -- Acute lactic acidosis is clinically significant and ruled in   -- Findings of no clinical significance   -- Other explanation, (please specify the other explanation)   -- Unable to determine      Query created by: Heike Fajardo on 2024 11:29 AM    RESPONSE TEXT:    Findings of no clinical significance          Electronically signed by:  RICHARD APARICIO DO 2024 1:57 PM

## 2024-08-10 LAB
ALBUMIN SERPL BCP-MCNC: 3.1 G/DL (ref 3.2–4.9)
ALBUMIN/GLOB SERPL: 1.1 G/DL
ALP SERPL-CCNC: 78 U/L (ref 30–99)
ALT SERPL-CCNC: 9 U/L (ref 2–50)
ANION GAP SERPL CALC-SCNC: 12 MMOL/L (ref 7–16)
APPEARANCE UR: ABNORMAL
AST SERPL-CCNC: 11 U/L (ref 12–45)
BACTERIA #/AREA URNS HPF: ABNORMAL /HPF
BACTERIA UR CULT: NORMAL
BASOPHILS # BLD AUTO: 0.1 % (ref 0–1.8)
BASOPHILS # BLD: 0.01 K/UL (ref 0–0.12)
BILIRUB SERPL-MCNC: 0.4 MG/DL (ref 0.1–1.5)
BILIRUB UR QL STRIP.AUTO: NEGATIVE
BUN SERPL-MCNC: 24 MG/DL (ref 8–22)
CALCIUM ALBUM COR SERPL-MCNC: 9.2 MG/DL (ref 8.5–10.5)
CALCIUM SERPL-MCNC: 8.5 MG/DL (ref 8.5–10.5)
CHLORIDE SERPL-SCNC: 99 MMOL/L (ref 96–112)
CO2 SERPL-SCNC: 27 MMOL/L (ref 20–33)
COLOR UR: YELLOW
CREAT SERPL-MCNC: 0.61 MG/DL (ref 0.5–1.4)
EKG IMPRESSION: NORMAL
EOSINOPHIL # BLD AUTO: 0.14 K/UL (ref 0–0.51)
EOSINOPHIL NFR BLD: 2.1 % (ref 0–6.9)
EPI CELLS #/AREA URNS HPF: ABNORMAL /HPF
ERYTHROCYTE [DISTWIDTH] IN BLOOD BY AUTOMATED COUNT: 42.8 FL (ref 35.9–50)
GFR SERPLBLD CREATININE-BSD FMLA CKD-EPI: 110 ML/MIN/1.73 M 2
GLOBULIN SER CALC-MCNC: 2.7 G/DL (ref 1.9–3.5)
GLUCOSE BLD STRIP.AUTO-MCNC: 209 MG/DL (ref 65–99)
GLUCOSE BLD STRIP.AUTO-MCNC: 229 MG/DL (ref 65–99)
GLUCOSE BLD STRIP.AUTO-MCNC: 273 MG/DL (ref 65–99)
GLUCOSE BLD STRIP.AUTO-MCNC: 287 MG/DL (ref 65–99)
GLUCOSE SERPL-MCNC: 180 MG/DL (ref 65–99)
GLUCOSE UR STRIP.AUTO-MCNC: NEGATIVE MG/DL
HCT VFR BLD AUTO: 32 % (ref 42–52)
HGB BLD-MCNC: 10.1 G/DL (ref 14–18)
HYALINE CASTS #/AREA URNS LPF: ABNORMAL /LPF
IMM GRANULOCYTES # BLD AUTO: 0.02 K/UL (ref 0–0.11)
IMM GRANULOCYTES NFR BLD AUTO: 0.3 % (ref 0–0.9)
KETONES UR STRIP.AUTO-MCNC: NEGATIVE MG/DL
LEUKOCYTE ESTERASE UR QL STRIP.AUTO: ABNORMAL
LYMPHOCYTES # BLD AUTO: 1.01 K/UL (ref 1–4.8)
LYMPHOCYTES NFR BLD: 14.8 % (ref 22–41)
MCH RBC QN AUTO: 26.4 PG (ref 27–33)
MCHC RBC AUTO-ENTMCNC: 31.6 G/DL (ref 32.3–36.5)
MCV RBC AUTO: 83.8 FL (ref 81.4–97.8)
MICRO URNS: ABNORMAL
MONOCYTES # BLD AUTO: 0.47 K/UL (ref 0–0.85)
MONOCYTES NFR BLD AUTO: 6.9 % (ref 0–13.4)
NEUTROPHILS # BLD AUTO: 5.16 K/UL (ref 1.82–7.42)
NEUTROPHILS NFR BLD: 75.8 % (ref 44–72)
NITRITE UR QL STRIP.AUTO: NEGATIVE
NRBC # BLD AUTO: 0 K/UL
NRBC BLD-RTO: 0 /100 WBC (ref 0–0.2)
PH UR STRIP.AUTO: 8.5 [PH] (ref 5–8)
PLATELET # BLD AUTO: 175 K/UL (ref 164–446)
PMV BLD AUTO: 8.8 FL (ref 9–12.9)
POTASSIUM SERPL-SCNC: 4.3 MMOL/L (ref 3.6–5.5)
PROT SERPL-MCNC: 5.8 G/DL (ref 6–8.2)
PROT UR QL STRIP: 300 MG/DL
RBC # BLD AUTO: 3.82 M/UL (ref 4.7–6.1)
RBC # URNS HPF: ABNORMAL /HPF
RBC UR QL AUTO: ABNORMAL
SIGNIFICANT IND 70042: NORMAL
SITE SITE: NORMAL
SODIUM SERPL-SCNC: 138 MMOL/L (ref 135–145)
SOURCE SOURCE: NORMAL
SP GR UR STRIP.AUTO: 1.02
TRI-PHOS CRY #/AREA URNS HPF: ABNORMAL /HPF
UROBILINOGEN UR STRIP.AUTO-MCNC: 0.2 MG/DL
WBC # BLD AUTO: 6.8 K/UL (ref 4.8–10.8)
WBC #/AREA URNS HPF: ABNORMAL /HPF

## 2024-08-10 PROCEDURE — 700101 HCHG RX REV CODE 250: Performed by: INTERNAL MEDICINE

## 2024-08-10 PROCEDURE — 81001 URINALYSIS AUTO W/SCOPE: CPT

## 2024-08-10 PROCEDURE — 700102 HCHG RX REV CODE 250 W/ 637 OVERRIDE(OP): Performed by: INTERNAL MEDICINE

## 2024-08-10 PROCEDURE — 770020 HCHG ROOM/CARE - TELE (206)

## 2024-08-10 PROCEDURE — 85025 COMPLETE CBC W/AUTO DIFF WBC: CPT

## 2024-08-10 PROCEDURE — 80053 COMPREHEN METABOLIC PANEL: CPT

## 2024-08-10 PROCEDURE — 36415 COLL VENOUS BLD VENIPUNCTURE: CPT

## 2024-08-10 PROCEDURE — 700102 HCHG RX REV CODE 250 W/ 637 OVERRIDE(OP)

## 2024-08-10 PROCEDURE — 700111 HCHG RX REV CODE 636 W/ 250 OVERRIDE (IP): Mod: JZ

## 2024-08-10 PROCEDURE — 93005 ELECTROCARDIOGRAM TRACING: CPT | Performed by: INTERNAL MEDICINE

## 2024-08-10 PROCEDURE — 82962 GLUCOSE BLOOD TEST: CPT

## 2024-08-10 PROCEDURE — 99233 SBSQ HOSP IP/OBS HIGH 50: CPT | Performed by: INTERNAL MEDICINE

## 2024-08-10 PROCEDURE — A9270 NON-COVERED ITEM OR SERVICE: HCPCS | Performed by: INTERNAL MEDICINE

## 2024-08-10 PROCEDURE — A9270 NON-COVERED ITEM OR SERVICE: HCPCS

## 2024-08-10 RX ORDER — LIDOCAINE 4 G/G
1 PATCH TOPICAL EVERY 24 HOURS
Status: DISCONTINUED | OUTPATIENT
Start: 2024-08-10 | End: 2024-08-12 | Stop reason: HOSPADM

## 2024-08-10 RX ORDER — CEPHALEXIN 500 MG/1
500 CAPSULE ORAL EVERY 6 HOURS
Status: DISCONTINUED | OUTPATIENT
Start: 2024-08-10 | End: 2024-08-12 | Stop reason: HOSPADM

## 2024-08-10 RX ADMIN — INSULIN HUMAN 3 UNITS: 100 INJECTION, SOLUTION PARENTERAL at 21:22

## 2024-08-10 RX ADMIN — VENLAFAXINE HYDROCHLORIDE 75 MG: 75 CAPSULE, EXTENDED RELEASE ORAL at 05:13

## 2024-08-10 RX ADMIN — OXYCODONE HYDROCHLORIDE 5 MG: 5 TABLET ORAL at 13:35

## 2024-08-10 RX ADMIN — CEPHALEXIN 500 MG: 500 CAPSULE ORAL at 16:58

## 2024-08-10 RX ADMIN — ASPIRIN 81 MG: 81 TABLET, COATED ORAL at 05:13

## 2024-08-10 RX ADMIN — INSULIN HUMAN 2 UNITS: 100 INJECTION, SOLUTION PARENTERAL at 13:39

## 2024-08-10 RX ADMIN — ENOXAPARIN SODIUM 40 MG: 100 INJECTION SUBCUTANEOUS at 16:59

## 2024-08-10 RX ADMIN — CEPHALEXIN 500 MG: 500 CAPSULE ORAL at 09:31

## 2024-08-10 RX ADMIN — INSULIN HUMAN 2 UNITS: 100 INJECTION, SOLUTION PARENTERAL at 09:34

## 2024-08-10 RX ADMIN — OXYCODONE HYDROCHLORIDE 5 MG: 5 TABLET ORAL at 18:08

## 2024-08-10 RX ADMIN — INSULIN HUMAN 3 UNITS: 100 INJECTION, SOLUTION PARENTERAL at 18:12

## 2024-08-10 RX ADMIN — LIDOCAINE 1 PATCH: 4 PATCH TOPICAL at 13:17

## 2024-08-10 RX ADMIN — OXYCODONE HYDROCHLORIDE 5 MG: 5 TABLET ORAL at 22:17

## 2024-08-10 ASSESSMENT — ENCOUNTER SYMPTOMS
BACK PAIN: 1
MEMORY LOSS: 0
NERVOUS/ANXIOUS: 0
WEAKNESS: 1
SHORTNESS OF BREATH: 0
DEPRESSION: 0
SEIZURES: 1
MYALGIAS: 1
CHILLS: 0
FEVER: 0
HEADACHES: 0
DIZZINESS: 0
HEARTBURN: 0
PALPITATIONS: 0
LOSS OF CONSCIOUSNESS: 1
FLANK PAIN: 0
NAUSEA: 0
ABDOMINAL PAIN: 0
DOUBLE VISION: 0
BLURRED VISION: 0
FOCAL WEAKNESS: 0

## 2024-08-10 ASSESSMENT — PAIN DESCRIPTION - PAIN TYPE
TYPE: ACUTE PAIN

## 2024-08-10 ASSESSMENT — FIBROSIS 4 INDEX: FIB4 SCORE: 1.257142857142857143

## 2024-08-10 NOTE — PROGRESS NOTES
[facility] Medicine Daily Progress Note    Date of Service  8/10/2024    Chief Complaint  Christoph Taylor is a 59 y.o. male admitted 8/8/2024 with hypoglycemia and seizure like episode.    Hospital Course    Christoph Taylor is a 59 y.o. male who presented 8/8/2024 with past medical Hx of DMII on insulin, came to the ED department after presenting a hypoglycemia episode after receiving an insulin dose at the rehab center, after these the patient started presenting a seizure episode witness by rehab facility staff in which the patient lost consciousness and started shaking his hands, after this episode the patient was confused and poorly responsive.      The patient was evaluated in the ED and was started on IV fluids with D5% and D10%, after this the patient glucose levels went back to normal and mental status of the patient also went back to normal, no focal deficits. Patient didn't have nausea or vomiting. Denies fever, chills, trauma.      In the ED patient labs showing: cbc with a Hb of 11.7, lactic acid of 2.9, CMP BUN of 25, K of 3.2, Glucose >140 mg/dl      CT head normal.      Patient placed in a D10% drip.       Interval Problem Update  8/9 improving blood sugar, blood sugar in the 200s  Patient denies nausea or headache  Lantus restarted  No seizure activity during this admission  Reports back and lower extremity pain, controlled  Skilled referral placed,  to assist    8/10 patient reports mild abdominal soreness, back pain controlled  Reports abdominal discomfort with dysuria x 2 days, positive urinalysis, started on antibiotics  Afebrile    I have discussed this patient's plan of care and discharge plan at IDT rounds today with Case Management, Nursing, Nursing leadership, and other members of the IDT team.    Consultants/Specialty  none    Code Status  Full Code    Disposition  The patient is not medically cleared for discharge to home or a post-acute facility.      I have placed the  appropriate orders for post-discharge needs.    Review of Systems  Review of Systems   Constitutional:  Negative for chills, fever and malaise/fatigue.   HENT:  Negative for congestion and hearing loss.    Eyes:  Negative for blurred vision and double vision.   Respiratory:  Negative for shortness of breath.    Cardiovascular:  Negative for palpitations and leg swelling.   Gastrointestinal:  Negative for abdominal pain, heartburn and nausea.   Genitourinary:  Negative for dysuria and flank pain.   Musculoskeletal:  Positive for back pain and myalgias. Negative for joint pain.   Neurological:  Positive for seizures, loss of consciousness and weakness. Negative for dizziness, focal weakness and headaches.   Psychiatric/Behavioral:  Negative for depression and memory loss. The patient is not nervous/anxious.         Physical Exam  Temp:  [36.6 °C (97.9 °F)-37.2 °C (99 °F)] 36.7 °C (98.1 °F)  Pulse:  [81-89] 81  Resp:  [14-20] 18  BP: (115-140)/(64-88) 138/82  SpO2:  [91 %-97 %] 91 %    Physical Exam  Vitals and nursing note reviewed.   Constitutional:       General: He is not in acute distress.     Appearance: He is ill-appearing. He is not diaphoretic.   HENT:      Head: Normocephalic and atraumatic.      Nose: Nose normal.      Mouth/Throat:      Mouth: Mucous membranes are moist.   Eyes:      Extraocular Movements: Extraocular movements intact.      Pupils: Pupils are equal, round, and reactive to light.   Neck:      Thyroid: No thyromegaly.      Vascular: No JVD.   Cardiovascular:      Rate and Rhythm: Normal rate.      Heart sounds: No murmur heard.  Pulmonary:      Effort: No respiratory distress.      Breath sounds: Normal breath sounds. No wheezing.   Abdominal:      General: Bowel sounds are normal. There is no distension.      Palpations: Abdomen is soft.      Tenderness: There is no abdominal tenderness.   Musculoskeletal:         General: No swelling or tenderness.      Cervical back: Neck supple.   Skin:      General: Skin is warm and dry.      Coloration: Skin is not jaundiced or pale.      Findings: No erythema.   Neurological:      Mental Status: He is alert and oriented to person, place, and time.      Cranial Nerves: No cranial nerve deficit.      Sensory: No sensory deficit.      Motor: Weakness present.   Psychiatric:         Behavior: Behavior normal.         Thought Content: Thought content normal.         Fluids    Intake/Output Summary (Last 24 hours) at 8/10/2024 1201  Last data filed at 8/10/2024 0957  Gross per 24 hour   Intake 840 ml   Output 500 ml   Net 340 ml       Laboratory  Recent Labs     08/08/24  0037 08/09/24  0516 08/10/24  0159   WBC 5.9 6.8 6.8   RBC 4.37* 3.96* 3.82*   HEMOGLOBIN 11.7* 10.8* 10.1*   HEMATOCRIT 36.6* 33.1* 32.0*   MCV 83.8 83.6 83.8   MCH 26.8* 27.3 26.4*   MCHC 32.0* 32.6 31.6*   RDW 42.5 42.5 42.8   PLATELETCT 215 168 175   MPV 8.7* 8.7* 8.8*     Recent Labs     08/08/24  0037 08/09/24  0516 08/10/24  0159   SODIUM 139 137 138   POTASSIUM 3.2* 4.1 4.3   CHLORIDE 100 102 99   CO2 25 27 27   GLUCOSE 69 245* 180*   BUN 25* 18 24*   CREATININE 0.73 0.52 0.61   CALCIUM 8.9 8.3* 8.5                   Imaging  DX-CHEST-PORTABLE (1 VIEW)   Final Result         1. No acute cardiopulmonary abnormalities are identified.      CT-HEAD W/O   Final Result         1. No acute intracranial abnormality. No evidence of acute intracranial hemorrhage or mass lesion.                                Assessment/Plan  * Seizure (HCC)- (present on admission)  Assessment & Plan  No previous seizures Hx, most likely secondary to hypoglycemia episode, patient at baseline  -admission to floor to continue monitoring   -if presents new seizure can consider EEG and neurology evaluation   -fall precautions   -seizures precautions     8/9 cont to monitor, no seizure activity noted  Neurochecks, likely dc in 24-48 hours, if bs controlled    8/10 okay to DC telemetry monitoring  Sinus rhythm      Lumbar  stenosis  Assessment & Plan  8/9 s/p surgery in May, follows up with Dr. Pulido  Neurosurgery  PT OT evaluation  Patient from [facility]  For outpatient follow-up as scheduled    Has follow-up appointment with Dr. Pulido on Monday, August 12, vault placed to advise Dr. Pulido of patient's inpatient status  Will likely need to reschedule outpatient appointment    8/10 pain controlled, continue therapy  Urine culture and influenza panel negative on admission  Now with positive urinalysis    Hypokalemia  Assessment & Plan  8/8 Patient K levels at 3.2, will correct   -continue to monitor   -giving oral potasium 40 mEq once     8/9 improved, k 4.1, monitor    Type 2 diabetes mellitus (HCC)  Assessment & Plan  Patient with Hx of diabetes controled with insulin, due to hypoglycemia episode will hold insulin and will continue on insulin drip.     Hypoglycemia- (present on admission)  Assessment & Plan  8/7 Most likely secondary to insulin dose provided at the rehab facility, improved after having dextrose IV. Currently glucose >140 mg/dl  -continue to monitor glucose levels   -continue D10% drip   -hold insulin until patient is more stable     8/8 hyperglycemia on d10, transition to nss  Accucheck q6, monitor  On diabetic diet  F/u A1c    8/9 A1c 9.9, uncontrolled  Improving, add lantus 5 u,cont ssi    8/10 hyperglycemia, blood sugar in the 200s to 300s  To adjust Lantus  Continue sliding scale insulin  Now noted to have UTI    Anemia- (present on admission)  Assessment & Plan  Chronic, patient current Hb 11.7  -continue to monitor     Chest pain- (present on admission)  Assessment & Plan  8/8 Chronic, patient takes oxycodone ad needed   -resume oxycodone PRN   -bowel protocol     8/9 reports recent CPR/chest compressions  Mild soreness  - EKG SR  Monitor    8/10 min chest discomfort described as soreness  TX interval noted at 0.2, will follow-up EKG  Echocardiogram in July with EF of 61%  Monitor  Continue  telemetry monitoring at this time         VTE prophylaxis:   SCDs/TEDs      I have performed a physical exam and reviewed and updated ROS and Plan today (8/10/2024). In review of yesterday's note (8/9/2024), there are no changes except as documented above.

## 2024-08-10 NOTE — CARE PLAN
Problem: Pain - Standard  Goal: Alleviation of pain or a reduction in pain to the patient’s comfort goal  Outcome: Progressing   Per patient, pain is 6/10, was medicated per MAR.      Problem: Skin Integrity  Goal: Skin integrity is maintained or improved  Outcome: Progressing   Will encourage patient to mobilize and get up for meals.     Problem: Respiratory  Goal: Patient will achieve/maintain optimum respiratory ventilation and gas exchange  Outcome: Progressing   Patient is currently on 1L of oxygen, will encourage IS use. Patient's goal for the IS use is 2500, however, patient was able to attained 2000. Will continue to encourage.      The patient is Stable - Low risk of patient condition declining or worsening    Shift Goals  Clinical Goals: Mlobility, IS use, Q4H neuro  Patient Goals: Pain control and rest  Family Goals: N/A    Progress made toward(s) clinical / shift goals:      Patient is not progressing towards the following goals:

## 2024-08-10 NOTE — PROGRESS NOTES
Telemetry Report:      Rhythm:  SR     Heart Rate: 61-89  Ectopy:    Rare PVCs        MN:  0.16           QRS:       0.09  QT:   0.34        Per Telestrip from Monitor Room

## 2024-08-10 NOTE — CARE PLAN
The patient is Stable - Low risk of patient condition declining or worsening    Shift Goals  Clinical Goals: q4 neuro checks, monitor sugars, monitor labs  Patient Goals: go home  Family Goals: vicente    Progress made toward(s) clinical / shift goals:    Problem: Pain - Standard  Goal: Alleviation of pain or a reduction in pain to the patient’s comfort goal  Description: Target End Date:  Prior to discharge or change in level of care    Document on Vitals flowsheet    1.  Document pain using the appropriate pain scale per order or unit policy  2.  Educate and implement non-pharmacologic comfort measures (i.e. relaxation, distraction, massage, cold/heat therapy, etc.)  3.  Pain management medications as ordered  4.  Reassess pain after pain med administration per policy  5.  If opiods administered assess patient's response to pain medication is appropriate per POSS sedation scale  6.  Follow pain management plan developed in collaboration with patient and interdisciplinary team (including palliative care or pain specialists if applicable)  Outcome: Progressing  Note: Went over side effects of pain medication, patient verbalized understanding. Monitored for adverse effects of pain medications. Utilized non-pharmacological pain management techniques.       Problem: Skin Integrity  Goal: Skin integrity is maintained or improved  Description: Target End Date:  Prior to discharge or change in level of care    Document interventions on Skin Risk/Oscar flowsheet groups and corresponding LDA    1.  Assess and monitor skin integrity, appearance and/or temperature  2.  Assess risk factors for impaired skin integrity and/or pressures ulcers  3.  Implement precautions to protect skin integrity in collaboration with interdisciplinary team  4.  Implement pressure ulcer prevention protocol if at risk for skin breakdown  5.  Confirm wound care consult if at risk for skin breakdown  6.  Ensure patient use of pressure relieving devices   (Low air loss bed, waffle overlay, heel protectors, ROHO cushion, etc)  Outcome: Progressing  Note: Pt turns self from side to side.  Pt educated about the importance of frequent repositioning to prevent skin breakdown. Encouraged turning in bed and notifying staff for help. Pt verbalized understanding. Appropriate dressings in place. Extra pillows in place for comfort, between knees, and to float heels. Barrier cream applied as appropriate. Pt cleaned and linens changed frequently as appropriate. Patient remained incontinent throughout the night, educated patient on importance to call if he feels wet/had an incontinent episode to prevent skin breakdown and maintain hygiene. Patient called appropriately throughout night.           Problem: Knowledge Deficit - Diabetes  Goal: Patient will demonstrate knowledge of insulin injection, symptoms, and treatment of hypoglycemia and diet prior to discharge  Description: Target End Date:  1-3 days or as soon as patient condition allows    1.  Explain disease process, medications and methods used in treating diabetes  2.  Explain signs and symptoms of hyper/hypoglycemia  3.  Educate about lifestyle changes including exercise, diet (carb counting) and sick day management  4.  Educate importance of regular vision appointments, regular foot care and monitoring of skin lesions  5.  Educate patient and family/caregiver on diabetes management and proper use of equipment for testing (syringe, pens and site rotation, glucometer, test strips)  6.  Collaborate with Diabetes Educator  7.  Assess patient and family/caregiver skills with insulin pens and finger sticks. Document level of understanding in patient education  Outcome: Progressing  Note: Diabetic education packet provided to patient, educated importance on monitoring skin frequently.

## 2024-08-11 LAB
GLUCOSE BLD STRIP.AUTO-MCNC: 150 MG/DL (ref 65–99)
GLUCOSE BLD STRIP.AUTO-MCNC: 225 MG/DL (ref 65–99)
GLUCOSE BLD STRIP.AUTO-MCNC: 248 MG/DL (ref 65–99)
GLUCOSE BLD STRIP.AUTO-MCNC: 280 MG/DL (ref 65–99)

## 2024-08-11 PROCEDURE — 700102 HCHG RX REV CODE 250 W/ 637 OVERRIDE(OP): Performed by: INTERNAL MEDICINE

## 2024-08-11 PROCEDURE — 700102 HCHG RX REV CODE 250 W/ 637 OVERRIDE(OP)

## 2024-08-11 PROCEDURE — 700101 HCHG RX REV CODE 250: Performed by: INTERNAL MEDICINE

## 2024-08-11 PROCEDURE — A9270 NON-COVERED ITEM OR SERVICE: HCPCS | Performed by: INTERNAL MEDICINE

## 2024-08-11 PROCEDURE — 99233 SBSQ HOSP IP/OBS HIGH 50: CPT | Performed by: INTERNAL MEDICINE

## 2024-08-11 PROCEDURE — 700111 HCHG RX REV CODE 636 W/ 250 OVERRIDE (IP): Mod: JZ

## 2024-08-11 PROCEDURE — 82962 GLUCOSE BLOOD TEST: CPT | Mod: 91

## 2024-08-11 PROCEDURE — 770020 HCHG ROOM/CARE - TELE (206)

## 2024-08-11 PROCEDURE — A9270 NON-COVERED ITEM OR SERVICE: HCPCS

## 2024-08-11 RX ADMIN — ENOXAPARIN SODIUM 40 MG: 100 INJECTION SUBCUTANEOUS at 18:23

## 2024-08-11 RX ADMIN — VENLAFAXINE HYDROCHLORIDE 75 MG: 75 CAPSULE, EXTENDED RELEASE ORAL at 04:51

## 2024-08-11 RX ADMIN — INSULIN HUMAN 2 UNITS: 100 INJECTION, SOLUTION PARENTERAL at 20:09

## 2024-08-11 RX ADMIN — OXYCODONE HYDROCHLORIDE 5 MG: 5 TABLET ORAL at 11:51

## 2024-08-11 RX ADMIN — CEPHALEXIN 500 MG: 500 CAPSULE ORAL at 23:39

## 2024-08-11 RX ADMIN — INSULIN HUMAN 2 UNITS: 100 INJECTION, SOLUTION PARENTERAL at 18:31

## 2024-08-11 RX ADMIN — CEPHALEXIN 500 MG: 500 CAPSULE ORAL at 18:23

## 2024-08-11 RX ADMIN — CEPHALEXIN 500 MG: 500 CAPSULE ORAL at 11:51

## 2024-08-11 RX ADMIN — INSULIN HUMAN 3 UNITS: 100 INJECTION, SOLUTION PARENTERAL at 14:21

## 2024-08-11 RX ADMIN — CEPHALEXIN 500 MG: 500 CAPSULE ORAL at 00:50

## 2024-08-11 RX ADMIN — CEPHALEXIN 500 MG: 500 CAPSULE ORAL at 04:51

## 2024-08-11 RX ADMIN — LIDOCAINE 1 PATCH: 4 PATCH TOPICAL at 11:51

## 2024-08-11 RX ADMIN — ASPIRIN 81 MG: 81 TABLET, COATED ORAL at 04:51

## 2024-08-11 ASSESSMENT — ENCOUNTER SYMPTOMS
FOCAL WEAKNESS: 0
WEAKNESS: 1
FLANK PAIN: 0
BACK PAIN: 1
SEIZURES: 1
SENSORY CHANGE: 0
MEMORY LOSS: 0
BLURRED VISION: 0
NAUSEA: 0
HEADACHES: 0
LOSS OF CONSCIOUSNESS: 1
CHILLS: 0
MYALGIAS: 1
DEPRESSION: 0
DIAPHORESIS: 0
PALPITATIONS: 0
DIZZINESS: 0
SHORTNESS OF BREATH: 0
NERVOUS/ANXIOUS: 0
ABDOMINAL PAIN: 0
FEVER: 0

## 2024-08-11 ASSESSMENT — FIBROSIS 4 INDEX: FIB4 SCORE: 1.257142857142857143

## 2024-08-11 ASSESSMENT — PAIN DESCRIPTION - PAIN TYPE
TYPE: ACUTE PAIN

## 2024-08-11 NOTE — CARE PLAN
Problem: Pain - Standard  Goal: Alleviation of pain or a reduction in pain to the patient’s comfort goal  Outcome: Progressing   Will continue with pain management per MAR.     Problem: Fall Risk  Goal: Patient will remain free from falls  Outcome: Progressing   All fall precautions are in place, patient call appropriately and will continue with hourly rounding.       Problem: Skin Integrity - Diabetes  Goal: Patient's skin on legs and feet will remain intact while hospitalized  Outcome: Progressing     Will continue to assess and and manage patient's skin daily.     The patient is Stable - Low risk of patient condition declining or worsening    Shift Goals  Clinical Goals: Mobility, Safety  Patient Goals: go home  Family Goals: vicente    Progress made toward(s) clinical / shift goals:      Patient is not progressing towards the following goals:

## 2024-08-11 NOTE — PROGRESS NOTES
Hospital Medicine Daily Progress Note    Date of Service  8/11/2024    Chief Complaint  Christoph Taylor is a 59 y.o. male admitted 8/8/2024 with hypoglycemia and seizure like episode.    Hospital Course    Christoph Taylor is a 59 y.o. male who presented 8/8/2024 with past medical Hx of DMII on insulin, came to the ED department after presenting a hypoglycemia episode after receiving an insulin dose at the rehab center, after these the patient started presenting a seizure episode witness by rehab facility staff in which the patient lost consciousness and started shaking his hands, after this episode the patient was confused and poorly responsive.      The patient was evaluated in the ED and was started on IV fluids with D5% and D10%, after this the patient glucose levels went back to normal and mental status of the patient also went back to normal, no focal deficits. Patient didn't have nausea or vomiting. Denies fever, chills, trauma.      In the ED patient labs showing: cbc with a Hb of 11.7, lactic acid of 2.9, CMP BUN of 25, K of 3.2, Glucose >140 mg/dl      CT head normal.      Patient placed in a D10% drip.       Interval Problem Update  8/9 improving blood sugar, blood sugar in the 200s  Patient denies nausea or headache  Lantus restarted  No seizure activity during this admission  Reports back and lower extremity pain, controlled  Skilled referral placed,  to assist    8/10 patient reports mild abdominal soreness, back pain controlled  Reports abdominal discomfort with dysuria x 2 days, positive urinalysis, started on antibiotics  Afebrile    8/11 patient reports mild right shoulder ache, chronic, relieved with medication  Minimal limitation to range of motion  No other complaints, back pain controlled    I have discussed this patient's plan of care and discharge plan at IDT rounds today with Case Management, Nursing, Nursing leadership, and other members of the IDT  team.    Consultants/Specialty  none    Code Status  Full Code    Disposition  The patient is not medically cleared for discharge to home or a post-acute facility.      I have placed the appropriate orders for post-discharge needs.    Review of Systems  Review of Systems   Constitutional:  Negative for chills, diaphoresis, fever and malaise/fatigue.   HENT:  Negative for congestion and hearing loss.    Eyes:  Negative for blurred vision.   Respiratory:  Negative for shortness of breath.    Cardiovascular:  Negative for palpitations and leg swelling.   Gastrointestinal:  Negative for abdominal pain and nausea.   Genitourinary:  Negative for dysuria and flank pain.   Musculoskeletal:  Positive for back pain, joint pain and myalgias.   Neurological:  Positive for seizures, loss of consciousness and weakness. Negative for dizziness, sensory change, focal weakness and headaches.   Psychiatric/Behavioral:  Negative for depression and memory loss. The patient is not nervous/anxious.         Physical Exam  Temp:  [36.6 °C (97.9 °F)-37.3 °C (99.1 °F)] 36.6 °C (97.9 °F)  Pulse:  [78-89] 89  Resp:  [16-20] 16  BP: (106-142)/(60-83) 142/83  SpO2:  [90 %-91 %] 90 %    Physical Exam  Vitals and nursing note reviewed.   Constitutional:       General: He is not in acute distress.     Appearance: He is ill-appearing. He is not toxic-appearing.   HENT:      Head: Normocephalic and atraumatic.      Nose: Nose normal.      Mouth/Throat:      Mouth: Mucous membranes are moist.   Eyes:      Extraocular Movements: Extraocular movements intact.      Pupils: Pupils are equal, round, and reactive to light.   Neck:      Thyroid: No thyromegaly.      Vascular: No JVD.   Cardiovascular:      Rate and Rhythm: Normal rate.      Heart sounds: No murmur heard.  Pulmonary:      Effort: No respiratory distress.      Breath sounds: Normal breath sounds. No wheezing.   Abdominal:      General: Bowel sounds are normal. There is no distension.       Palpations: Abdomen is soft.      Tenderness: There is no abdominal tenderness.   Musculoskeletal:         General: No swelling, tenderness, deformity or signs of injury.      Cervical back: Neck supple.      Comments: Right shoulder, no limited range of motion, nontender to palpation   Skin:     General: Skin is warm and dry.      Coloration: Skin is not jaundiced or pale.   Neurological:      Mental Status: He is alert and oriented to person, place, and time.      Cranial Nerves: No cranial nerve deficit.      Sensory: No sensory deficit.      Motor: Weakness present.      Coordination: Coordination normal.   Psychiatric:         Behavior: Behavior normal.         Thought Content: Thought content normal.         Fluids    Intake/Output Summary (Last 24 hours) at 8/11/2024 1137  Last data filed at 8/11/2024 0400  Gross per 24 hour   Intake 720 ml   Output 1350 ml   Net -630 ml       Laboratory  Recent Labs     08/09/24  0516 08/10/24  0159   WBC 6.8 6.8   RBC 3.96* 3.82*   HEMOGLOBIN 10.8* 10.1*   HEMATOCRIT 33.1* 32.0*   MCV 83.6 83.8   MCH 27.3 26.4*   MCHC 32.6 31.6*   RDW 42.5 42.8   PLATELETCT 168 175   MPV 8.7* 8.8*     Recent Labs     08/09/24  0516 08/10/24  0159   SODIUM 137 138   POTASSIUM 4.1 4.3   CHLORIDE 102 99   CO2 27 27   GLUCOSE 245* 180*   BUN 18 24*   CREATININE 0.52 0.61   CALCIUM 8.3* 8.5                   Imaging  DX-CHEST-PORTABLE (1 VIEW)   Final Result         1. No acute cardiopulmonary abnormalities are identified.      CT-HEAD W/O   Final Result         1. No acute intracranial abnormality. No evidence of acute intracranial hemorrhage or mass lesion.                                Assessment/Plan  * Seizure (HCC)- (present on admission)  Assessment & Plan  No previous seizures Hx, most likely secondary to hypoglycemia episode, patient at baseline  -admission to floor to continue monitoring   -if presents new seizure can consider EEG and neurology evaluation   -fall precautions    -seizures precautions     8/9 cont to monitor, no seizure activity noted  Neurochecks, likely dc in 24-48 hours, if bs controlled    8/10 okay to DC telemetry monitoring  Sinus rhythm      Lumbar stenosis  Assessment & Plan  8/9 s/p surgery in May, follows up with Dr. Pulido  Neurosurgery  PT OT evaluation  Patient from [facility]  For outpatient follow-up as scheduled    Has follow-up appointment with Dr. Pulido on Monday, August 12, vault placed to advise Dr. Pulido of patient's inpatient status  Will likely need to reschedule outpatient appointment    8/10 pain controlled, continue therapy  Urine culture and influenza panel negative on admission  Now with positive urinalysis    Hypokalemia  Assessment & Plan  8/8 Patient K levels at 3.2, will correct   -continue to monitor   -giving oral potasium 40 mEq once     8/9 improved, k 4.1, monitor    Type 2 diabetes mellitus (HCC)  Assessment & Plan  Patient with Hx of diabetes controled with insulin, due to hypoglycemia episode will hold insulin and will continue on insulin drip.     Hypoglycemia- (present on admission)  Assessment & Plan  8/7 Most likely secondary to insulin dose provided at the rehab facility, improved after having dextrose IV. Currently glucose >140 mg/dl  -continue to monitor glucose levels   -continue D10% drip   -hold insulin until patient is more stable     8/8 hyperglycemia on d10, transition to nss  Accucheck q6, monitor  On diabetic diet  F/u A1c    8/9 A1c 9.9, uncontrolled  Improving, add lantus 5 u,cont ssi    8/10 hyperglycemia, blood sugar in the 200s to 300s  To adjust Lantus  Continue sliding scale insulin  Now noted to have UTI    Anemia- (present on admission)  Assessment & Plan  Chronic, patient current Hb 11.7  -continue to monitor     Chest pain- (present on admission)  Assessment & Plan  8/8 Chronic, patient takes oxycodone ad needed   -resume oxycodone PRN   -bowel protocol     8/9 reports recent CPR/chest  compressions  Mild soreness  - EKG SR  Monitor    8/10 min chest discomfort described as soreness  VA interval noted at 0.2, will follow-up EKG  Echocardiogram in July with EF of 61%  Monitor  Continue telemetry monitoring at this time    8/11 resolved  EKG reviewed, borderline prolonged VA interval, asymptomatic  Okay to DC telemetry monitoring         VTE prophylaxis:   SCDs/TEDs      I have performed a physical exam and reviewed and updated ROS and Plan today (8/11/2024). In review of yesterday's note (8/10/2024), there are no changes except as documented above.

## 2024-08-11 NOTE — PROGRESS NOTES
Tele summary    Rhythm:SR  Rate:76-82's  ID/QRS/QT:  0.16/0.08/0.37  Ectopies: rare PVC's

## 2024-08-11 NOTE — CARE PLAN
Problem: Pain - Standard  Goal: Alleviation of pain or a reduction in pain to the patient’s comfort goal  Outcome: Progressing  Will continue to manage patient's pain using both non pharmacological and pharmacological methods.       Problem: Fall Risk  Goal: Patient will remain free from falls  Outcome: Progressing     All fall precautions are in place and patient's calls appropriately will continue with hourly rounding.     Problem: Mobility  Goal: Patient's capacity to carry out activities will improve  Outcome: Progressing   Will encourage patient to get up for all meals and ambulate in the hallways.     The patient is Stable - Low risk of patient condition declining or worsening    Shift Goals  Clinical Goals: Pain management, BG checks, safety  Patient Goals: Sleep comfortably  Family Goals: KATHERINE    Progress made toward(s) clinical / shift goals:      Patient is not progressing towards the following goals:

## 2024-08-12 VITALS
DIASTOLIC BLOOD PRESSURE: 89 MMHG | TEMPERATURE: 97.9 F | RESPIRATION RATE: 18 BRPM | OXYGEN SATURATION: 98 % | BODY MASS INDEX: 18.75 KG/M2 | WEIGHT: 138.45 LBS | HEIGHT: 72 IN | SYSTOLIC BLOOD PRESSURE: 140 MMHG | HEART RATE: 85 BPM

## 2024-08-12 LAB
GLUCOSE BLD STRIP.AUTO-MCNC: 206 MG/DL (ref 65–99)
GLUCOSE BLD STRIP.AUTO-MCNC: 480 MG/DL (ref 65–99)

## 2024-08-12 PROCEDURE — A9270 NON-COVERED ITEM OR SERVICE: HCPCS | Performed by: INTERNAL MEDICINE

## 2024-08-12 PROCEDURE — 82962 GLUCOSE BLOOD TEST: CPT

## 2024-08-12 PROCEDURE — 700101 HCHG RX REV CODE 250: Performed by: INTERNAL MEDICINE

## 2024-08-12 PROCEDURE — 700102 HCHG RX REV CODE 250 W/ 637 OVERRIDE(OP): Performed by: INTERNAL MEDICINE

## 2024-08-12 PROCEDURE — A9270 NON-COVERED ITEM OR SERVICE: HCPCS

## 2024-08-12 PROCEDURE — 700102 HCHG RX REV CODE 250 W/ 637 OVERRIDE(OP)

## 2024-08-12 PROCEDURE — 99239 HOSP IP/OBS DSCHRG MGMT >30: CPT | Performed by: INTERNAL MEDICINE

## 2024-08-12 RX ORDER — LIDOCAINE 4 G/G
1 PATCH TOPICAL EVERY 24 HOURS
Status: SHIPPED
Start: 2024-08-12 | End: 2024-08-24

## 2024-08-12 RX ORDER — CEPHALEXIN 500 MG/1
500 CAPSULE ORAL EVERY 6 HOURS
Qty: 20 CAPSULE | Refills: 0 | Status: ACTIVE | OUTPATIENT
Start: 2024-08-12 | End: 2024-08-17

## 2024-08-12 RX ORDER — OXYCODONE HYDROCHLORIDE 5 MG/1
5 TABLET ORAL EVERY 4 HOURS PRN
Qty: 15 TABLET | Refills: 0 | Status: SHIPPED | OUTPATIENT
Start: 2024-08-12 | End: 2024-08-15

## 2024-08-12 RX ORDER — INSULIN GLARGINE 100 [IU]/ML
15 INJECTION, SOLUTION SUBCUTANEOUS EVERY EVENING
Status: ACTIVE | DISCHARGE
Start: 2024-08-12 | End: 2024-08-29 | Stop reason: SDUPTHER

## 2024-08-12 RX ADMIN — LIDOCAINE 1 PATCH: 4 PATCH TOPICAL at 12:45

## 2024-08-12 RX ADMIN — CEPHALEXIN 500 MG: 500 CAPSULE ORAL at 12:45

## 2024-08-12 RX ADMIN — ASPIRIN 81 MG: 81 TABLET, COATED ORAL at 05:42

## 2024-08-12 RX ADMIN — VENLAFAXINE HYDROCHLORIDE 75 MG: 75 CAPSULE, EXTENDED RELEASE ORAL at 05:43

## 2024-08-12 RX ADMIN — CEPHALEXIN 500 MG: 500 CAPSULE ORAL at 05:43

## 2024-08-12 RX ADMIN — INSULIN HUMAN 2 UNITS: 100 INJECTION, SOLUTION PARENTERAL at 09:18

## 2024-08-12 ASSESSMENT — FIBROSIS 4 INDEX: FIB4 SCORE: 1.257142857142857143

## 2024-08-12 NOTE — DISCHARGE SUMMARY
Discharge Summary    CHIEF COMPLAINT ON ADMISSION  Chief Complaint   Patient presents with    Low Blood Sugar     Pt BIBA from [facility] for low blood sugar. Facility checked BGL at 60, was given insulin, blood sugar read error, per facility pt had a seizure and became hypoxic, CPR was initiated by staff. Upon EMS arrival given Glucagon and D10 and BGL to 72 currently. Pt is A&Ox3, requiring 4 L NC.        Reason for Admission  ems    Admission Date  8/8/2024     CODE STATUS  Full Code    HPI & HOSPITAL COURSE         Hospital Medicine Daily Progress Note     Date of Service  8/11/2024     Chief Complaint  Christoph Taylor is a 59 y.o. male admitted 8/8/2024 with hypoglycemia and seizure like episode.     Hospital Course     Christoph Taylor is a 59 y.o. male who presented 8/8/2024 with past medical Hx of DMII on insulin, came to the ED department after presenting a hypoglycemia episode after receiving an insulin dose at the rehab center, after these the patient started presenting a seizure episode witness by rehab facility staff in which the patient lost consciousness and started shaking his hands, after this episode the patient was confused and poorly responsive.      The patient was evaluated in the ED and was started on IV fluids with D5% and D10%, after this the patient glucose levels went back to normal and mental status of the patient also went back to normal, no focal deficits. Patient didn't have nausea or vomiting. Denies fever, chills, trauma.      In the ED patient labs showing: cbc with a Hb of 11.7, lactic acid of 2.9, CMP BUN of 25, K of 3.2, Glucose >140 mg/dl      CT head normal.      Patient placed in a D10% drip.  Hypoglycemia improved.  Patient was transitioned to normal saline as well as sliding scale insulin.  A1c is at 9.9.  We have slowly increased Lantus coverage.  On discharge, patient feels better.  Blood sugar is now in the 150s to 200s range.  He will discharge back to  skilled facility on lower dose of Lantus as well as sliding scale insulin to be adjusted as tolerated.  He is now tolerating oral diet.    Patient did have some complaints of chest discomfort, EKG with slightly prolonged NH interval of greater than 0.2.  He remained in sinus rhythm during this admission.    As for history of lumbar stenosis status post surgery.  Patient was to follow-up with neurosurgery Dr. Pulido.  A message was sent to Dr. Pulido as well as advising patient to notify clinic staff to reschedule follow-up appointment.  Patient reports pain controlled.    Lastly, patient was noted to have seizure like activity with associated hypoglycemia.  He has no prior history of seizures.  He was not noted to have any recurrent episodes during this admission.    Given improvement of symptoms as well as blood sugar and physical function he is now cleared to return to skilled facility for continued therapy and likely discharge to home soon after.          Therefore, he is discharged in good and stable condition to skilled nursing facility.    The patient met 2-midnight criteria for an inpatient stay at the time of discharge.      FOLLOW UP ITEMS POST DISCHARGE  Pcp in 2 days  Nsg a scheduled    DISCHARGE DIAGNOSES  Principal Problem:    Seizure (HCC) (POA: Yes)  Active Problems:    Chest pain (POA: Yes)    Acute cystitis without hematuria (POA: Yes)      Overview: 8/10 positive urinalysis, with associated hyperglycemia, will start       antibiotics, follow-up cultures, initial culture with UA on August 8 was       negative    Anemia (POA: Yes)    Hypoglycemia (POA: Yes)    Type 2 diabetes mellitus (HCC) (POA: Unknown)    Hypokalemia (POA: Unknown)    Seizure-like activity (HCC) (POA: Yes)    Lumbar stenosis (POA: Unknown)  Resolved Problems:    * No resolved hospital problems. *      FOLLOW UP  No future appointments.  [facility]  [address]  [id number]  [phone]          MEDICATIONS ON DISCHARGE     Medication List        START taking these medications        Instructions   cephALEXin 500 MG Caps  Commonly known as: Keflex   Take 1 Capsule by mouth every 6 hours for 5 days.  Dose: 500 mg     insulin glargine 100 UNIT/ML injection PEN  Generic drug: insulin glargine  Replaces: insulin GLARGINE 100 UNIT/ML Soln   Inject 15 Units under the skin every evening.  Dose: 15 Units     lidocaine 4 % Ptch  Commonly known as: Asperflex   Place 1 Patch on the skin every 24 hours.  Dose: 1 Patch            CONTINUE taking these medications        Instructions   acetaminophen 325 MG Tabs  Commonly known as: Tylenol   Take 650 mg by mouth every four hours as needed.  Dose: 650 mg     aspirin 81 MG EC tablet   Take 81 mg by mouth every day.  Dose: 81 mg     insulin lispro 100 UNIT/ML  Commonly known as: HumaLOG   Inject 2-10 Units under the skin 2 times a day. Per sliding scale:  151 - 200 = 2 units  201 - 250 = 4 units  251 - 300 = 6 units  301 - 350 = 8 units  351 - 400 = 10 units  Dose: 2-10 Units     loperamide 2 MG Caps  Commonly known as: Imodium   Take 4 mg by mouth every 6 hours as needed for Diarrhea.  Dose: 4 mg     oxyCODONE immediate-release 5 MG Tabs  Commonly known as: Roxicodone   Take 1 Tablet by mouth every four hours as needed for Severe Pain for up to 3 days.  Dose: 5 mg     Venlafaxine HCl 75 MG Tb24   Take 75 mg by mouth every day.  Dose: 75 mg     Voltaren 1 % Gel  Generic drug: diclofenac sodium   Apply 2-4 g topically 2 times a day. To right shoulder  Dose: 2-4 g            STOP taking these medications      bisacodyl 10 MG Supp  Commonly known as: Dulcolax     insulin GLARGINE 100 UNIT/ML Soln  Commonly known as: Lantus,Semglee  Replaced by: insulin glargine 100 UNIT/ML injection PEN     magnesium hydroxide 400 MG/5ML Susp  Commonly known as: Milk Of Magnesia     sodium phosphate 7-19 GM/118ML Enem              Allergies  Allergies   Allergen  Reactions    Ketamine      aggressive       DIET  Orders Placed This Encounter   Procedures    Diet Order Diet: Consistent CHO (Diabetic)     Standing Status:   Standing     Number of Occurrences:   1     Order Specific Question:   Diet:     Answer:   Consistent CHO (Diabetic) [4]       ACTIVITY  As tolerated and directed by skilled nursing.  Weight bearing as tolerated    LINES, DRAINS, AND WOUNDS  This is an automated list. Peripheral IVs will be removed prior to discharge.  Peripheral IV 08/08/24 18 G Anterior;Right Forearm (Active)   Site Assessment Clean;Dry;Intact 08/12/24 0930   Dressing Type Transparent 08/12/24 0930   Line Status Scrubbed the hub prior to access;Flushed;Saline locked 08/12/24 0930   Dressing Status Clean;Dry;Intact 08/12/24 0930   Dressing Intervention N/A 08/11/24 2020   Infiltration Grading ([facility], [facility]) 0 08/12/24 0930   Phlebitis Scale ([facility] Only) 0 08/12/24 0930       Peripheral IV 08/08/24 20 G Anterior;Left Forearm (Active)   Site Assessment Clean;Dry;Intact 08/12/24 0930   Dressing Type Transparent 08/12/24 0930   Line Status Scrubbed the hub prior to access;Flushed;Saline locked 08/12/24 0930   Dressing Status Intact;Dry;Other (Comment) 08/12/24 0930   Dressing Intervention N/A 08/11/24 2020   Infiltration Grading ([facility], [facility]) 0 08/12/24 0930   Phlebitis Scale ([facility] Only) 0 08/12/24 0930       Moisture Associated Skin Damage 06/06/24 Buttock;Perineum (Active)       Wound 03/11/23 Hand;Finger, Thumb;Finger, 2nd;Finger, 3rd;Finger, 4th;Finger, 5th Left (Active)       Wound 03/11/23 Hand Right (Active)       Wound 03/11/23 Toe, Hallux Left (Active)       Wound 06/12/23 Incision Finger, Thumb Right 4x4, nathaly, coban  (Active)       Wound 01/26/24 Arm Distal;Anterior Left (Active)       Wound 05/06/24 Ankle Ventral Left (Active)       Wound 05/06/24 Ankle Anterior Left (Active)       Wound 06/04/24 Incision Neck Bilateral steri, mastisol, 4x4, tegaderm (Active)   Site Assessment  KATHERINE 06/18/24 0800   Periwound Assessment Dry;Clean;Intact 06/18/24 0800   Margins Attached edges 06/17/24 0800   Closure Closure strips 06/18/24 0800   Drainage Amount None 06/18/24 0800   Dressing Status Dry;Intact 06/18/24 0800   Dressing Changed Observed 06/18/24 0800   Dressing Options Dry Gauze;Transparent Film 06/18/24 0800   NEXT Weekly Photo (Inpatient Only) 06/19/24 06/17/24 2000   Adhesive Closure Strips Intact 06/13/24 2000       Wound 06/07/24 Incision Back BACITRACIN, 4X4, TEGADERM (Active)   Wound Image   06/13/24 2000   Site Assessment KATHERINE 06/18/24 0800   Periwound Assessment Clean;Intact;Dry 06/18/24 0800   Margins Attached edges 06/18/24 0800   Closure Staples 06/18/24 0800   Drainage Amount None 06/18/24 0800   Treatments Offloading 06/17/24 2000   Wound Cleansing Foam Cleanser/Washcloth 06/18/24 0800   Periwound Protectant Not Applicable 06/17/24 0800   Dressing Status Dry;Clean;Intact 06/18/24 0800   Dressing Changed Observed 06/18/24 0800   Dressing Options Transparent Film;Dry Gauze;Island Dressing 06/18/24 0800   NEXT Weekly Photo (Inpatient Only) 06/19/24 06/17/24 0800       Wound 07/22/24 Partial Thickness Wound Foot Right (Active)       Peripheral IV 08/08/24 18 G Anterior;Right Forearm (Active)   Site Assessment Clean;Dry;Intact 08/12/24 0930   Dressing Type Transparent 08/12/24 0930   Line Status Scrubbed the hub prior to access;Flushed;Saline locked 08/12/24 0930   Dressing Status Clean;Dry;Intact 08/12/24 0930   Dressing Intervention N/A 08/11/24 2020   Infiltration Grading ([facility]) 0 08/12/24 0930   Phlebitis Scale ([facility] Only) 0 08/12/24 0930       Peripheral IV 08/08/24 20 G Anterior;Left Forearm (Active)   Site Assessment Clean;Dry;Intact 08/12/24 0930   Dressing Type Transparent 08/12/24 0930   Line Status Scrubbed the hub prior to access;Flushed;Saline locked 08/12/24 0930   Dressing Status Intact;Dry;Other (Comment) 08/12/24 0930   Dressing Intervention N/A 08/11/24 2020    Infiltration Grading ([facility], [facility]) 0 08/12/24 0930   Phlebitis Scale ([facility] Only) 0 08/12/24 0930               MENTAL STATUS ON TRANSFER             CONSULTATIONS  none    PROCEDURES  none    LABORATORY  Lab Results   Component Value Date    SODIUM 138 08/10/2024    POTASSIUM 4.3 08/10/2024    CHLORIDE 99 08/10/2024    CO2 27 08/10/2024    GLUCOSE 180 (H) 08/10/2024    BUN 24 (H) 08/10/2024    CREATININE 0.61 08/10/2024        Lab Results   Component Value Date    WBC 6.8 08/10/2024    HEMOGLOBIN 10.1 (L) 08/10/2024    HEMATOCRIT 32.0 (L) 08/10/2024    PLATELETCT 175 08/10/2024        Total time of the discharge process exceeds 45 minutes.

## 2024-08-12 NOTE — PROGRESS NOTES
Discharge Instructions    Discharged to group home by medical transportation with escort. Discharged via ambulance, hospital escort: Yes.  Special equipment needed: Not Applicable    Be sure to schedule a follow-up appointment with your primary care doctor or any specialists as instructed.     Discharge Plan:   Diet Plan: Discussed  Activity Level: Discussed  Confirmed Follow up Appointment: Patient to Call and Schedule Appointment  Confirmed Symptoms Management: Discussed  Medication Reconciliation Updated: Yes    I understand that a diet low in cholesterol, fat, and sodium is recommended for good health. Unless I have been given specific instructions below for another diet, I accept this instruction as my diet prescription.   Other diet: Diabetic    Special Instructions: None    -Is this patient being discharged with medication to prevent blood clots?  No    Is patient discharged on Warfarin / Coumadin?   No       Patient discharging to [facility], RN to RN report called to Cornelius CASPER. Patient is alert and oriented x4, on room air. Discharge instructions reviewed with patient, all questions answered accordingly verbalizes understanding. PIV x2 removed. COBRA packet given to transporter, prescription in packet.  All belonging with patient at time of discharge.

## 2024-08-12 NOTE — DIETARY
Nutrition Update:    Day 4 of admit.  Christoph Taylor is a 59 y.o. male with admitting DX of Seizure,   Seizure-like activity.     Patient being followed to optimize nutrition.    Current Diet: Consistent CHO (diabetic)    Problem: Nutritional:  Goal: Achieve adequate nutritional intake  Description: Patient will consume 50% of meals  Outcome: Met    PO has been >50% of all recent meals.    RD will follow per department guidelines.

## 2024-08-12 NOTE — DISCHARGE PLANNING
DC Transport Scheduled    Transport Company Scheduled:  [facility]    Scheduled Date: 8/12/2024  Scheduled Time: [id number]    Destination: [facility]  [address] [place]    Notified care team of scheduled transport via Vo[employer].     If there are any changes needed to the DC transportation scheduled, please contact [facility] at ext. [phone] between the hours of 2143-9371 Mon-Fri. If outside those hours, contact the ED Case Manager at ext. [phone].

## 2024-08-12 NOTE — CARE PLAN
The patient is Stable - Low risk of patient condition declining or worsening    Shift Goals  Clinical Goals: IS use, safety  Patient Goals: rest, comfort  Family Goals: KATHERINE    Progress made toward(s) clinical / shift goals:    Problem: Hemodynamics  Goal: Patient's hemodynamics, fluid balance and neurologic status will be stable or improve  Description: Target End Date:  Prior to discharge or change in level of care    Document on Assessment and I/O flowsheet templates    1.  Monitor vital signs, pulse oximetry and cardiac monitor per provider order and/or policy  2.  Maintain blood pressure per provider order  3.  Hemodynamic monitoring per provider order  4.  Manage IV fluids and IV infusions  5.  Monitor intake and output  6.  Daily weights per unit policy or provider order  7.  Assess peripheral pulses and capillary refill  8.  Assess color and body temperature  9.  Position patient for maximum circulation/cardiac output  10. Monitor for signs/symptoms of excessive bleeding  11. Assess mental status, restlessness and changes in level of consciousness  12. Monitor temperature and report fever or hypothermia to provider immediately. Consideration of targeted temperature management.  Outcome: Progressing  Note: Monitor v/s, level of consciousness, lab.results for any acute changes notify the provider,monitor and record intake and output, weigh the pt daily.      Problem: Fall Risk  Goal: Patient will remain free from falls  Description: Target End Date:  Prior to discharge or change in level of care    Document interventions on the [facility] Fall Risk Assessment    1.  Assess for fall risk factors  2.  Implement fall precautions  Outcome: Progressing  Note: Patient remained free from falls. All fall precautions in place. Patient educated the need to call when needed assistance, patient verbalized understanding. Call light and personal belongings within reach.      Problem: Skin Integrity  Goal: Skin integrity is  maintained or improved  Description: Target End Date:  Prior to discharge or change in level of care    Document interventions on Skin Risk/Oscar flowsheet groups and corresponding LDA    1.  Assess and monitor skin integrity, appearance and/or temperature  2.  Assess risk factors for impaired skin integrity and/or pressures ulcers  3.  Implement precautions to protect skin integrity in collaboration with interdisciplinary team  4.  Implement pressure ulcer prevention protocol if at risk for skin breakdown  5.  Confirm wound care consult if at risk for skin breakdown  6.  Ensure patient use of pressure relieving devices  (Low air loss bed, waffle overlay, heel protectors, ROHO cushion, etc)  Outcome: Progressing  Note: Pt turns self from side to side. Wound care onboard. Pt educated about the importance of frequent repositioning to prevent skin breakdown. Encouraged turning in bed and notifying staff for help. Pt verbalized understanding. Appropriate dressings in place. Extra pillows in place for comfort, between knees, and to float heels. Barrier cream applied as appropriate. Pt cleaned and linens changed frequently as appropriate.          Problem: Knowledge Deficit - Standard  Goal: Patient and family/care givers will demonstrate understanding of plan of care, disease process/condition, diagnostic tests and medications  Description: Target End Date:  1-3 days or as soon as patient condition allows    Document in Patient Education    1.  Patient and family/caregiver oriented to unit, equipment, visitation policy and means for communicating concern  2.  Complete/review Learning Assessment  3.  Assess knowledge level of disease process/condition, treatment plan, diagnostic tests and medications  4.  Explain disease process/condition, treatment plan, diagnostic tests and medications  Outcome: Progressing  Note: Patient updated regarding plan of care and current treatment. All questions answered. Pt voiced  understanding.         Patient is not progressing towards the following goals:

## 2024-08-24 ENCOUNTER — PHARMACY VISIT (OUTPATIENT)
Dept: PHARMACY | Facility: MEDICAL CENTER | Age: 59
End: 2024-08-24
Payer: COMMERCIAL

## 2024-08-24 ENCOUNTER — APPOINTMENT (OUTPATIENT)
Dept: RADIOLOGY | Facility: MEDICAL CENTER | Age: 59
End: 2024-08-24
Attending: STUDENT IN AN ORGANIZED HEALTH CARE EDUCATION/TRAINING PROGRAM
Payer: COMMERCIAL

## 2024-08-24 ENCOUNTER — HOSPITAL ENCOUNTER (EMERGENCY)
Facility: MEDICAL CENTER | Age: 59
End: 2024-08-24
Attending: STUDENT IN AN ORGANIZED HEALTH CARE EDUCATION/TRAINING PROGRAM
Payer: COMMERCIAL

## 2024-08-24 VITALS
OXYGEN SATURATION: 95 % | HEIGHT: 72 IN | RESPIRATION RATE: 18 BRPM | WEIGHT: 160 LBS | HEART RATE: 79 BPM | TEMPERATURE: 97.7 F | SYSTOLIC BLOOD PRESSURE: 140 MMHG | DIASTOLIC BLOOD PRESSURE: 70 MMHG | BODY MASS INDEX: 21.67 KG/M2

## 2024-08-24 DIAGNOSIS — S22.31XA CLOSED FRACTURE OF ONE RIB OF RIGHT SIDE, INITIAL ENCOUNTER: ICD-10-CM

## 2024-08-24 DIAGNOSIS — J18.9 PNEUMONIA OF BOTH LOWER LOBES DUE TO INFECTIOUS ORGANISM: ICD-10-CM

## 2024-08-24 DIAGNOSIS — S22.22XA CLOSED FRACTURE OF BODY OF STERNUM, INITIAL ENCOUNTER: ICD-10-CM

## 2024-08-24 LAB
ALBUMIN SERPL BCP-MCNC: 3.2 G/DL (ref 3.2–4.9)
ALBUMIN/GLOB SERPL: 1 G/DL
ALP SERPL-CCNC: 165 U/L (ref 30–99)
ALT SERPL-CCNC: 8 U/L (ref 2–50)
ANION GAP SERPL CALC-SCNC: 11 MMOL/L (ref 7–16)
AST SERPL-CCNC: 16 U/L (ref 12–45)
BASOPHILS # BLD AUTO: 0.2 % (ref 0–1.8)
BASOPHILS # BLD: 0.01 K/UL (ref 0–0.12)
BILIRUB SERPL-MCNC: 0.2 MG/DL (ref 0.1–1.5)
BUN SERPL-MCNC: 31 MG/DL (ref 8–22)
CALCIUM ALBUM COR SERPL-MCNC: 9.3 MG/DL (ref 8.5–10.5)
CALCIUM SERPL-MCNC: 8.7 MG/DL (ref 8.5–10.5)
CHLORIDE SERPL-SCNC: 104 MMOL/L (ref 96–112)
CO2 SERPL-SCNC: 24 MMOL/L (ref 20–33)
CREAT SERPL-MCNC: 0.8 MG/DL (ref 0.5–1.4)
D DIMER PPP IA.FEU-MCNC: 1 UG/ML (FEU) (ref 0–0.5)
EKG IMPRESSION: NORMAL
EOSINOPHIL # BLD AUTO: 0.16 K/UL (ref 0–0.51)
EOSINOPHIL NFR BLD: 2.5 % (ref 0–6.9)
ERYTHROCYTE [DISTWIDTH] IN BLOOD BY AUTOMATED COUNT: 41.9 FL (ref 35.9–50)
GFR SERPLBLD CREATININE-BSD FMLA CKD-EPI: 101 ML/MIN/1.73 M 2
GLOBULIN SER CALC-MCNC: 3.1 G/DL (ref 1.9–3.5)
GLUCOSE SERPL-MCNC: 296 MG/DL (ref 65–99)
HCT VFR BLD AUTO: 34.3 % (ref 42–52)
HGB BLD-MCNC: 10.9 G/DL (ref 14–18)
IMM GRANULOCYTES # BLD AUTO: 0.01 K/UL (ref 0–0.11)
IMM GRANULOCYTES NFR BLD AUTO: 0.2 % (ref 0–0.9)
LYMPHOCYTES # BLD AUTO: 1.1 K/UL (ref 1–4.8)
LYMPHOCYTES NFR BLD: 17.1 % (ref 22–41)
MCH RBC QN AUTO: 26.4 PG (ref 27–33)
MCHC RBC AUTO-ENTMCNC: 31.8 G/DL (ref 32.3–36.5)
MCV RBC AUTO: 83.1 FL (ref 81.4–97.8)
MONOCYTES # BLD AUTO: 0.46 K/UL (ref 0–0.85)
MONOCYTES NFR BLD AUTO: 7.1 % (ref 0–13.4)
NEUTROPHILS # BLD AUTO: 4.71 K/UL (ref 1.82–7.42)
NEUTROPHILS NFR BLD: 72.9 % (ref 44–72)
NRBC # BLD AUTO: 0 K/UL
NRBC BLD-RTO: 0 /100 WBC (ref 0–0.2)
PLATELET # BLD AUTO: 282 K/UL (ref 164–446)
PMV BLD AUTO: 8.6 FL (ref 9–12.9)
POTASSIUM SERPL-SCNC: 4.7 MMOL/L (ref 3.6–5.5)
PROT SERPL-MCNC: 6.3 G/DL (ref 6–8.2)
RBC # BLD AUTO: 4.13 M/UL (ref 4.7–6.1)
SODIUM SERPL-SCNC: 139 MMOL/L (ref 135–145)
TROPONIN T SERPL-MCNC: 32 NG/L (ref 6–19)
TROPONIN T SERPL-MCNC: 34 NG/L (ref 6–19)
WBC # BLD AUTO: 6.5 K/UL (ref 4.8–10.8)

## 2024-08-24 PROCEDURE — 85025 COMPLETE CBC W/AUTO DIFF WBC: CPT

## 2024-08-24 PROCEDURE — A9270 NON-COVERED ITEM OR SERVICE: HCPCS | Performed by: STUDENT IN AN ORGANIZED HEALTH CARE EDUCATION/TRAINING PROGRAM

## 2024-08-24 PROCEDURE — 93005 ELECTROCARDIOGRAM TRACING: CPT

## 2024-08-24 PROCEDURE — 84484 ASSAY OF TROPONIN QUANT: CPT

## 2024-08-24 PROCEDURE — 71045 X-RAY EXAM CHEST 1 VIEW: CPT

## 2024-08-24 PROCEDURE — 93005 ELECTROCARDIOGRAM TRACING: CPT | Performed by: STUDENT IN AN ORGANIZED HEALTH CARE EDUCATION/TRAINING PROGRAM

## 2024-08-24 PROCEDURE — 71275 CT ANGIOGRAPHY CHEST: CPT

## 2024-08-24 PROCEDURE — 80053 COMPREHEN METABOLIC PANEL: CPT

## 2024-08-24 PROCEDURE — 36415 COLL VENOUS BLD VENIPUNCTURE: CPT

## 2024-08-24 PROCEDURE — 700117 HCHG RX CONTRAST REV CODE 255: Performed by: STUDENT IN AN ORGANIZED HEALTH CARE EDUCATION/TRAINING PROGRAM

## 2024-08-24 PROCEDURE — 700102 HCHG RX REV CODE 250 W/ 637 OVERRIDE(OP): Performed by: STUDENT IN AN ORGANIZED HEALTH CARE EDUCATION/TRAINING PROGRAM

## 2024-08-24 PROCEDURE — 99285 EMERGENCY DEPT VISIT HI MDM: CPT

## 2024-08-24 PROCEDURE — RXMED WILLOW AMBULATORY MEDICATION CHARGE: Performed by: STUDENT IN AN ORGANIZED HEALTH CARE EDUCATION/TRAINING PROGRAM

## 2024-08-24 PROCEDURE — 85379 FIBRIN DEGRADATION QUANT: CPT

## 2024-08-24 PROCEDURE — 700101 HCHG RX REV CODE 250: Performed by: STUDENT IN AN ORGANIZED HEALTH CARE EDUCATION/TRAINING PROGRAM

## 2024-08-24 RX ORDER — OXYCODONE HYDROCHLORIDE 5 MG/1
5 TABLET ORAL EVERY 4 HOURS PRN
Status: SHIPPED | COMMUNITY
End: 2024-08-29

## 2024-08-24 RX ORDER — BISACODYL 10 MG
10 SUPPOSITORY, RECTAL RECTAL
COMMUNITY

## 2024-08-24 RX ORDER — LIDOCAINE 4 G/G
1 PATCH TOPICAL EVERY 24 HOURS
COMMUNITY

## 2024-08-24 RX ORDER — SODIUM PHOSPHATE,MONO-DIBASIC 19G-7G/118
1 ENEMA (ML) RECTAL
COMMUNITY

## 2024-08-24 RX ORDER — DOXYCYCLINE 100 MG/1
100 CAPSULE ORAL 2 TIMES DAILY
Qty: 14 CAPSULE | Refills: 0 | Status: ACTIVE | OUTPATIENT
Start: 2024-08-24 | End: 2024-08-31

## 2024-08-24 RX ORDER — OXYCODONE HYDROCHLORIDE 5 MG/1
5 TABLET ORAL ONCE
Status: COMPLETED | OUTPATIENT
Start: 2024-08-24 | End: 2024-08-24

## 2024-08-24 RX ORDER — ASPIRIN 81 MG/1
324 TABLET, CHEWABLE ORAL ONCE
Status: COMPLETED | OUTPATIENT
Start: 2024-08-24 | End: 2024-08-24

## 2024-08-24 RX ORDER — NALOXONE HYDROCHLORIDE 0.4 MG/ML
0.4 INJECTION, SOLUTION INTRAMUSCULAR; INTRAVENOUS; SUBCUTANEOUS
COMMUNITY

## 2024-08-24 RX ORDER — LIDOCAINE 4 G/G
1 PATCH TOPICAL ONCE
Status: DISCONTINUED | OUTPATIENT
Start: 2024-08-24 | End: 2024-08-25 | Stop reason: HOSPADM

## 2024-08-24 RX ORDER — CELECOXIB 100 MG/1
100 CAPSULE ORAL 2 TIMES DAILY
Status: SHIPPED | COMMUNITY
End: 2024-08-29 | Stop reason: SDUPTHER

## 2024-08-24 RX ADMIN — ASPIRIN 324 MG: 81 TABLET, CHEWABLE ORAL at 18:35

## 2024-08-24 RX ADMIN — LIDOCAINE 1 PATCH: 4 PATCH TOPICAL at 20:23

## 2024-08-24 RX ADMIN — IOHEXOL 52 ML: 350 INJECTION, SOLUTION INTRAVENOUS at 20:00

## 2024-08-24 RX ADMIN — OXYCODONE HYDROCHLORIDE 5 MG: 5 TABLET ORAL at 21:20

## 2024-08-24 ASSESSMENT — FIBROSIS 4 INDEX: FIB4 SCORE: 1.257142857142857143

## 2024-08-24 ASSESSMENT — HEART SCORE
TROPONIN: 1-3 TIMES NORMAL LIMIT
RISK FACTORS: >2 RISK FACTORS OR HX OF ATHEROSCLEROTIC DISEASE
HISTORY: SLIGHTLY SUSPICIOUS
AGE: 45-64
HEART SCORE: 4
ECG: NORMAL

## 2024-08-24 ASSESSMENT — PAIN DESCRIPTION - PAIN TYPE: TYPE: ACUTE PAIN

## 2024-08-25 NOTE — DISCHARGE INSTRUCTIONS
Take antibiotics as prescribed, use the incentive spirometer every hour while awake, this will keep your lungs expanded and keep you from getting more ill.  Return if you develop fever, have difficulty breathing, other new concerning symptoms.  You can take Tylenol, use lidocaine patches, Voltaren gel as needed for pain.

## 2024-08-25 NOTE — ED NOTES
Med rec updated and complete.  Allergies reviewed. . Per order summary sheet from [facility].    Multiple attempts were made to have MARS faxed . No MARS have arrived.      Unable to verify last administered doses

## 2024-08-25 NOTE — ED NOTES
[facility] Arrived. Report given. Pt transferred to [facility]'s stretcher safely.

## 2024-08-25 NOTE — ED PROVIDER NOTES
ER Provider Note    Scribed for Misha Cross M.D. by Sana Arredondo. 8/24/2024   6:15 PM    Primary Care Provider: DIANA Tariq    CHIEF COMPLAINT  Chief Complaint   Patient presents with    Chest Pain     R sided.      EXTERNAL RECORDS REVIEWED  Patient was seen in the ED on several occasions. Once for hypoglycemia, and CPR was performed. Unclear if he lost pulses. The time before he was seen for a ground level fall. Patient has a history of diabetes and is currently on insulin. He has a history of hypertension. Patient just had lumbar spine surgery in June.       HPI/ROS  LIMITATION TO HISTORY   Select: : None  OUTSIDE HISTORIAN(S):  None    Christoph Taylor is a 59 y.o. male who presents to the ED via EMS for evaluation of chest pain onset prior to arrival. Patient states that several weeks ago he received CPR, and notes that his chest pain has not improve. Patient has been going to [place] for leg and arm strengthening and was recommended to come to the ED for further evaluation for his chest pain. He reports that when he tries to inhale deeply it exacerbates his pain. He adds that last night he sneezed which exacerbated his pain. Patient notes associated loss of control of his bladder and bowels, but denies any shortness of breath, fevers, cough, or black tarry stool. He denies being on any blood thinners. Patient also denies a history of blood clots or heart attacks. He reports that his father had two bypass surgeries in the past. Patient denies smoking recently.       PAST MEDICAL HISTORY  Past Medical History:   Diagnosis Date    Abnormal electrocardiogram (ECG) (EKG) 11/8/2021    KRISTAL (acute kidney injury) (HCC) 11/8/2021    Chest pain in adult 11/8/2021    CKD (chronic kidney disease) stage 3, GFR 30-59 ml/min 11/7/2021    Diabetes (HCC)     Diabetic ketoacidosis without coma associated with type 2 diabetes mellitus (HCC) 11/7/2021    Diarrhea 3/18/2022    DKA, type 1, not at goal  ([facility]) 7/28/2022    Esophagitis 7/28/2022    [place] (hard of hearing)     Very [place] No hearing aides    Hypercholesteremia        SURGICAL HISTORY  Past Surgical History:   Procedure Laterality Date    FUSION, SPINE, LUMBAR, PLIF N/A 6/7/2024    Procedure: FUSION, SPINE, LUMBAR, PLIF- REDO LUMBAR 4-5 LAMINECTOMY WITH L4-5 STEALTH FUSION;  Surgeon: Moo Pulido III, M.D.;  Location: SURGERY [facility];  Service: Neurosurgery    LUMBAR LAMINECTOMY DISKECTOMY N/A 6/7/2024    Procedure: LAMINECTOMY, SPINE, LUMBAR, WITH DISCECTOMY;  Surgeon: Moo Pulido III, M.D.;  Location: SURGERY [facility];  Service: Neurosurgery    CERVICAL DISK AND FUSION ANTERIOR N/A 6/4/2024    Procedure: C4-C7 ANTERIOR CERVICAL DISC AND FUSION;  Surgeon: Moo Puliod III, M.D.;  Location: SURGERY [facility];  Service: Neurosurgery    FINGER OR HAND INCISION AND DRAINAGE Right 6/12/2023    Procedure: INCISION AND DRAINAGE, HAND - THUMB;  Surgeon: Ace Shaw M.D.;  Location: SURGERY [facility];  Service: Orthopedics    VT UPPER GI ENDOSCOPY,DIAGNOSIS N/A 3/14/2023    Procedure: GASTROSCOPY;  Surgeon: Atilio Freed M.D.;  Location: [facility];  Service: Gastroenterology    OTHER      appy, lumbar fusion       FAMILY HISTORY  Family History   Problem Relation Age of Onset    Heart Disease Father        SOCIAL HISTORY   reports that he has quit smoking. He has never used smokeless tobacco. He reports that he does not currently use alcohol. He reports that he does not currently use drugs.    CURRENT MEDICATIONS  Previous Medications    ACETAMINOPHEN (TYLENOL) 325 MG TAB    Take 650 mg by mouth every four hours as needed for Mild Pain or Fever. 325 mg x 2 tablets = 650 mg    ASPIRIN 81 MG EC TABLET    Take 81 mg by mouth every day.    BISACODYL (DULCOLAX) 10 MG SUPPOS    Insert 10 mg into the rectum 1 time a day as needed. Indications: Constipation    CELECOXIB (CELEBREX) 100 MG CAP    Take 100 mg by mouth 2 times a day.     DICLOFENAC SODIUM (VOLTAREN) 1 % GEL    Apply 2-4 g topically 2 times a day. To right shoulder    INSULIN GLARGINE 100 UNIT/ML SOLUTION PEN-INJECTOR INJECTION    Inject 15 Units under the skin every evening.    INSULIN LISPRO 100 UNIT/ML INJ    Inject 2-10 Units under the skin 2 times a day. Per sliding scale:  151 - 200 = 2 units  201 - 250 = 4 units  251 - 300 = 6 units  301 - 350 = 8 units  351 - 400 = 10 units    LIDOCAINE (HM LIDOCAINE PATCH) 4 % PATCH    Place 1 Patch on the skin every 24 hours. * apply to anterior chest*    LOPERAMIDE (IMODIUM) 2 MG CAP    Take 4 mg by mouth every 6 hours as needed for Diarrhea. 2 mg x 2 capsules = 4 mg    MAGNESIUM HYDROXIDE (MILK OF MAGNESIA) 400 MG/5ML SUSPENSION    Take 30 mL by mouth every 72 hours as needed. Indications: Constipation    NALOXONE (NARCAN) 0.4 MG/ML SOLUTION    Infuse 0.4 mg into a venous catheter every 24 hours as needed. Indications: Opioid Overdose    OXYCODONE IMMEDIATE-RELEASE (ROXICODONE) 5 MG TAB    Take 5 mg by mouth every four hours as needed for Severe Pain.    SODIUM PHOSPHATE 7-19 GM/118ML ENEMA    Insert 1 Enema into the rectum 1 time a day as needed. Indications: Evacuation of Material from the Bowel    VENLAFAXINE HCL 75 MG TABLET SR 24 HR    Take 75 mg by mouth every day.       ALLERGIES  Allergies   Allergen Reactions    Ketamine      aggressive        PHYSICAL EXAM  /78   Pulse 83   Ht 1.829 m (6')   Wt 72.6 kg (160 lb)   SpO2 97%   BMI 21.70 kg/m²      General: Chronically ill appearing  Head: Normocephalic atraumatic  Eyes: Extraocular motion intact  Neck: Supple, no rigidity  Cardiovascular: Regular rate and rhythm no murmurs rubs or gallops tenderness over the chest wall and anterior sternum.  Respiratory: Diminished left lower lung field, equal chest rise and fall, no increased work of breathing  Abdomen: Soft nontender no guarding  Musculoskeletal: Warm and well perfused, no peripheral edema. Cachectic appearing in lower  extremities, no external evidence of DVT.   Neuro: Alert, no focal deficits  Integumentary: No wounds or rashes    DIAGNOSTIC STUDIES    Labs:   Labs Reviewed   CBC WITH DIFFERENTIAL - Abnormal; Notable for the following components:       Result Value    RBC 4.13 (*)     Hemoglobin 10.9 (*)     Hematocrit 34.3 (*)     MCH 26.4 (*)     MCHC 31.8 (*)     MPV 8.6 (*)     Neutrophils-Polys 72.90 (*)     Lymphocytes 17.10 (*)     All other components within normal limits   COMP METABOLIC PANEL - Abnormal; Notable for the following components:    Glucose 296 (*)     Bun 31 (*)     Alkaline Phosphatase 165 (*)     All other components within normal limits   TROPONIN - Abnormal; Notable for the following components:    Troponin T 34 (*)     All other components within normal limits   TROPONIN - Abnormal; Notable for the following components:    Troponin T 32 (*)     All other components within normal limits   D-DIMER - Abnormal; Notable for the following components:    D-Dimer 1.00 (*)     All other components within normal limits   ESTIMATED GFR   D-dimer is elevated, above use criteria cutoff, troponins are downtrending, and at patient's baseline.    EKG:   Results for orders placed or performed during the hospital encounter of 24   EKG   Result Value Ref Range    Report       [facility] Emergency Dept.    Test Date:  2024  Pt Name:    BAILEE JACOBSON               Department: ER  MRN:        [id number]                      Room:       RD 04  Gender:     Male                         Technician: [id number]  :        1965                   Requested By:ER TRIAGE PROTOCOL  Order #:    [id number]                    Reading MD: Myke Cross    Measurements  Intervals                                Axis  Rate:       82                           P:          45  TX:         161                          QRS:        44  QRSD:       94                           T:          60  QT:         381  QTc:         445    Interpretive Statements  Sinus rhythm, rate of 82, normal axis, normal intervals, less than 1 mm ST  elevation in lead II, unchanged compared to prior, 8/10/2024  Electronically Signed On 08- 18:15:39 PDT by Myke Cross           Radiology:   This attending emergency physician has independently interpreted the diagnostic imaging associated with this visit and is awaiting the final reading from the radiologist.   Preliminary interpretation is a follows: No displaced rib fracture or pneumothorax or acute abnormality.     Radiologist interpretation:   CT-CTA CHEST PULMONARY ARTERY W/ RECONS   Final Result      1.  No pulmonary embolism.   2.  Moderate lower lobe pulmonary opacities suggesting atelectasis and infiltrates.   3.  Acute sternal and right anterior fourth rib fractures.            DX-CHEST-PORTABLE (1 VIEW)   Final Result         Hazy left basilar opacities, likely atelectasis.           INITIAL ASSESSMENT COURSE AND PLAN  Care Narrative       6:15 PM - Patient was evaluated at bedside for chest pain onset prior to arrival. Ordered for labs and radiology to evaluate. The patient will be medicated with Aspirin 324 mg for his symptoms. Patient verbalizes understanding and support with my plan of care.  Differential diagnoses include but not limited to: Atypical ACS, arrhythmia vs Post CPR pain vs Nondisplaced rib fracture vs Pneumonia vs PE.         CHEST PAIN:   HEART Score for Major Cardiac Events  HEART Score     History: Slightly suspicious  ECG: Normal  Age: 45-64  Risk Factors: >2 risk factors or hx of atherosclerotic disease  Troponin: 1-3 times normal limit  Heart Score: 4    Total Score   0-3 Points = Low Score, risk of MACE 0.9-1.7%.  4-6 Points = Moderate Score, risk of MACE 12-16.6%  7-10 Points = High Score, risk of MACE 50-65%    8:50 PM - Reviewed patient's laboratory, imaging results at the bedside, patient is agreeable to discharge, we discussed strict return precautions,  and outpatient follow-up, patient was discharged in no acute distress.  All questions were answered prior to discharge.      Heart score is elevated at 4, given elevated troponin, however in the setting of a sternal fracture, and rib fracture at the area of the patient's reported pain, I feel it is not actively reflex the patient's immediate cardiac risk factor and that he is appropriate for outpatient cardiac risk stratification.    The patient is coughing, and there are some Opacification of pulmonary opacities, which are likely atelectasis but could represent early pneumonia.  Patient given incentive spirometer,Will be started on doxycycline to cover potential early pneumonia.      DISPOSITION AND DISCUSSIONS    I have discussed management of the patient with the following physicians and MICHELLE's:  None    Discussion of management with other Hospitals in [place] or appropriate source(s): None     Escalation of care considered, and ultimately not performed: acute inpatient care management, however at this time, the patient is most appropriate for outpatient management.    Barriers to care at this time, including but not limited to:  None present at this time .     Decision tools and prescription drugs considered including, but not limited to: Pain Medications lidocaine, aspirin .     The patient will return for new or worsening symptoms and is stable at the time of discharge.    The patient is referred to a primary physician for blood pressure management, diabetic screening, and for all other preventative health concerns.    DISPOSITION:  Patient will be discharged home in stable condition.    FOLLOW UP:  PCP    OUTPATIENT MEDICATIONS:  New Prescriptions    DOXYCYCLINE (MONODOX) 100 MG CAPSULE    Take 1 Capsule by mouth 2 times a day for 7 days.     FINAL DIAGNOSIS  1. Closed fracture of body of sternum, initial encounter    2. Closed fracture of one rib of right side, initial encounter    3. Pneumonia of both lower lobes due to  infectious organism         I, Sana Arredondo (Scribe), am scribing for, and in the presence of, Myke Cross M.D..    Electronically signed by: Sana Arredondo (Tamia[place]ali), 8/24/2024    IMyke M.D. personally performed the services described in this documentation, as scribed by Sana Arredondo in my presence, and it is both accurate and complete.      The note accurately reflects work and decisions made by me.  Myke Cross M.D.  8/25/2024  12:35 AM

## 2024-08-25 NOTE — ED TRIAGE NOTES
Chief Complaint   Patient presents with    Chest Pain     R sided.      Pt BIB EMS from [facility] for above. Pt had a hypoglycemic episode about 2 weeks ago when someone performed cpr on him, he reports pain has been present ever since. Pt reports any pain improvement since event. Pain worse with deep inspiration.     FSBS 288 per EMS prior to arrival. GCS 15.

## 2024-08-29 ENCOUNTER — OFFICE VISIT (OUTPATIENT)
Dept: MEDICAL GROUP | Facility: LAB | Age: 59
End: 2024-08-29
Payer: COMMERCIAL

## 2024-08-29 VITALS
TEMPERATURE: 98.1 F | HEART RATE: 88 BPM | DIASTOLIC BLOOD PRESSURE: 58 MMHG | SYSTOLIC BLOOD PRESSURE: 90 MMHG | OXYGEN SATURATION: 93 % | BODY MASS INDEX: 23.11 KG/M2 | RESPIRATION RATE: 14 BRPM | HEIGHT: 72 IN | WEIGHT: 170.6 LBS

## 2024-08-29 DIAGNOSIS — R07.89 OTHER CHEST PAIN: ICD-10-CM

## 2024-08-29 DIAGNOSIS — E11.65 UNCONTROLLED TYPE 2 DIABETES MELLITUS WITH HYPERGLYCEMIA (HCC): ICD-10-CM

## 2024-08-29 DIAGNOSIS — R32 URINARY INCONTINENCE, UNSPECIFIED TYPE: ICD-10-CM

## 2024-08-29 DIAGNOSIS — R45.86 MOOD CHANGES: ICD-10-CM

## 2024-08-29 DIAGNOSIS — M48.02 CERVICAL STENOSIS OF SPINAL CANAL: ICD-10-CM

## 2024-08-29 DIAGNOSIS — M48.061 SPINAL STENOSIS OF LUMBAR REGION, UNSPECIFIED WHETHER NEUROGENIC CLAUDICATION PRESENT: ICD-10-CM

## 2024-08-29 PROBLEM — J96.20 ACUTE ON CHRONIC RESPIRATORY FAILURE (HCC): Status: RESOLVED | Noted: 2024-05-20 | Resolved: 2024-08-29

## 2024-08-29 PROBLEM — R10.12 INTRACTABLE LEFT UPPER QUADRANT ABDOMINAL PAIN: Status: RESOLVED | Noted: 2024-05-21 | Resolved: 2024-08-29

## 2024-08-29 PROBLEM — R74.8 ELEVATED ALKALINE PHOSPHATASE LEVEL: Status: ACTIVE | Noted: 2024-06-13

## 2024-08-29 PROBLEM — N30.00 ACUTE CYSTITIS WITHOUT HEMATURIA: Status: RESOLVED | Noted: 2024-01-26 | Resolved: 2024-08-29

## 2024-08-29 PROBLEM — J96.01 ACUTE HYPOXIC RESPIRATORY FAILURE (HCC): Status: RESOLVED | Noted: 2024-07-22 | Resolved: 2024-08-29

## 2024-08-29 PROBLEM — E87.6 HYPOKALEMIA: Status: RESOLVED | Noted: 2024-08-08 | Resolved: 2024-08-29

## 2024-08-29 PROBLEM — D64.9 ACUTE ON CHRONIC ANEMIA: Status: RESOLVED | Noted: 2024-05-06 | Resolved: 2024-08-29

## 2024-08-29 PROBLEM — D72.829 LEUKOCYTOSIS: Status: RESOLVED | Noted: 2024-05-20 | Resolved: 2024-08-29

## 2024-08-29 PROBLEM — J96.01 ACUTE RESPIRATORY FAILURE WITH HYPOXIA (HCC): Status: RESOLVED | Noted: 2024-07-25 | Resolved: 2024-08-29

## 2024-08-29 PROBLEM — R10.9 AP (ABDOMINAL PAIN): Status: RESOLVED | Noted: 2023-06-08 | Resolved: 2024-08-29

## 2024-08-29 PROBLEM — I95.9 HYPOTENSION: Status: RESOLVED | Noted: 2024-01-26 | Resolved: 2024-08-29

## 2024-08-29 PROBLEM — R45.1 AGITATION: Status: RESOLVED | Noted: 2024-06-09 | Resolved: 2024-08-29

## 2024-08-29 PROBLEM — E87.1 HYPONATREMIA: Status: RESOLVED | Noted: 2022-07-28 | Resolved: 2024-08-29

## 2024-08-29 PROBLEM — K56.49: Status: RESOLVED | Noted: 2024-05-21 | Resolved: 2024-08-29

## 2024-08-29 PROBLEM — E11.10 DIABETIC KETOACIDOSIS WITHOUT COMA (HCC): Status: RESOLVED | Noted: 2021-11-07 | Resolved: 2024-08-29

## 2024-08-29 PROBLEM — S50.319A ELBOW ABRASION: Status: RESOLVED | Noted: 2024-01-27 | Resolved: 2024-08-29

## 2024-08-29 PROBLEM — K52.9 ENTERITIS: Status: RESOLVED | Noted: 2023-06-10 | Resolved: 2024-08-29

## 2024-08-29 PROBLEM — R10.9 ABDOMINAL PAIN: Status: RESOLVED | Noted: 2023-03-11 | Resolved: 2024-08-29

## 2024-08-29 PROBLEM — E16.2 HYPOGLYCEMIA: Status: RESOLVED | Noted: 2024-06-13 | Resolved: 2024-08-29

## 2024-08-29 PROBLEM — R79.89 ELEVATED TROPONIN: Status: RESOLVED | Noted: 2023-06-10 | Resolved: 2024-08-29

## 2024-08-29 PROBLEM — R55 SYNCOPE: Status: RESOLVED | Noted: 2023-06-09 | Resolved: 2024-08-29

## 2024-08-29 PROBLEM — N18.9 CHRONIC RENAL INSUFFICIENCY: Status: RESOLVED | Noted: 2024-06-04 | Resolved: 2024-08-29

## 2024-08-29 PROBLEM — E87.29 METABOLIC ACIDOSIS, INCREASED ANION GAP: Status: RESOLVED | Noted: 2021-11-07 | Resolved: 2024-08-29

## 2024-08-29 PROBLEM — K29.00 ACUTE SUPERFICIAL GASTRITIS WITHOUT HEMORRHAGE: Status: RESOLVED | Noted: 2024-05-21 | Resolved: 2024-08-29

## 2024-08-29 PROBLEM — K20.90 ACUTE ESOPHAGITIS: Status: RESOLVED | Noted: 2022-07-28 | Resolved: 2024-08-29

## 2024-08-29 PROBLEM — R73.9 HYPERGLYCEMIA: Status: RESOLVED | Noted: 2023-08-03 | Resolved: 2024-08-29

## 2024-08-29 PROBLEM — R74.01 TRANSAMINITIS: Status: RESOLVED | Noted: 2024-06-12 | Resolved: 2024-08-29

## 2024-08-29 PROBLEM — G93.41 ACUTE METABOLIC ENCEPHALOPATHY: Status: RESOLVED | Noted: 2023-06-09 | Resolved: 2024-08-29

## 2024-08-29 RX ORDER — OXYCODONE HYDROCHLORIDE 10 MG/1
10 TABLET ORAL EVERY 12 HOURS PRN
Qty: 28 TABLET | Refills: 0 | Status: SHIPPED | OUTPATIENT
Start: 2024-08-29 | End: 2024-09-12

## 2024-08-29 RX ORDER — CELECOXIB 100 MG/1
100 CAPSULE ORAL 2 TIMES DAILY
Qty: 180 CAPSULE | Refills: 3 | Status: SHIPPED | OUTPATIENT
Start: 2024-08-29

## 2024-08-29 RX ORDER — GABAPENTIN 100 MG/1
100 CAPSULE ORAL 3 TIMES DAILY
Qty: 270 CAPSULE | Refills: 3 | Status: SHIPPED | OUTPATIENT
Start: 2024-08-29

## 2024-08-29 RX ORDER — INSULIN GLARGINE 100 [IU]/ML
15 INJECTION, SOLUTION SUBCUTANEOUS EVERY EVENING
Qty: 15 ML | Refills: 3 | Status: SHIPPED | OUTPATIENT
Start: 2024-08-29

## 2024-08-29 RX ORDER — INSULIN LISPRO 100 [IU]/ML
2-10 INJECTION, SOLUTION INTRAVENOUS; SUBCUTANEOUS 2 TIMES DAILY
Qty: 10 ML | Refills: 3 | Status: SHIPPED | OUTPATIENT
Start: 2024-08-29

## 2024-08-29 ASSESSMENT — FIBROSIS 4 INDEX: FIB4 SCORE: 1.2

## 2024-08-29 NOTE — LETTER
August 29, 2024         Patient: Christoph Taylor   YOB: 1965   Date of Visit: 8/29/2024       To Whom It May Concern,    Re: Emotional Support Animal letter    Christoph Taylor is my patient, and has been under my care since 2023. I am intimately aware of his medical history and functional restrictions brought by his mental condition. He meets the definition of disabled under the Americans with Disabilities Act, the Rehabilitation Act of 1973, and the Fair Housing Act.    As a result of mental illness, Christoph has certain limitations related to anxiety and depression. In order to assist in alleviating these difficulties, and to improve their ability to lead a better life while fully enjoying and using the dwelling unit you own and/or manage, I am prescribing an Emotional Support Animal prescription letter that will help Christoph in dealing with his disability better.     I am very much aware of the voluminous professional literature related to the therapeutic benefits of emotional support animals for individuals with mental disabilities, such as that faced by Christoph.     I am a licensed APRN in the [facility]. My license number is [id number].    Should you have any further questions, please do not hesitate to contact with me.      Sincerely,           YESY Tariq.  Electronically Signed

## 2024-08-29 NOTE — PROGRESS NOTES
Chief Complaint   Patient presents with    Hospital Follow-up    Medication Refill     Verbal consent was acquired by the patient to use [employer] ambient listening note generation during this visit Yes      HPI:  History of Present Illness  The patient presents for a hospital follow-up.    He has been experiencing frequent hospital admissions. During a recent admission he was found to have a fractured sternum and a broken rib, which he attributes to cardiopulmonary resuscitation (CPR). He has been informed that his sternum will take approximately 6 weeks to heal. He is currently on Celebrex twice daily and oxycodone for pain management, which he finds particularly helpful for sleep. He would like a refill a this time.    He spent over a month at [facility] recovering from neck and back surgery performed by Dr. Pulido in 06/2024. However, he has not had a follow-up appointment due to transportation issues. He reports weakness in his legs and arms, which he believes necessitates physical therapy following his surgery.    During his stay at [facility], he was administered both long-acting and fast-acting insulin, but currently only has the latter at home. He is seeking a sliding scale for insulin management as he is apprehensive about self-administration.    He reports urinary incontinence, which he describes as a burning sensation during urination. He has completed a course of antibiotics for a urinary tract infection (UTI) at [facility] and is unsure if the infection has completely resolved. He is currently on antibiotics to prevent pneumonia.    He reports no abdominal pain and is not on oxygen therapy. He is no longer taking venlafaxine and uses Imodium as needed. He has not experienced diarrhea since his discharge from the emergency room. He has been using an incentive spirometer for breathing exercises without any issues.    ROS:  As documented in history of present illness above    Exam:   BP 90/58 (BP Location:  Right arm, Patient Position: Sitting, BP Cuff Size: Adult)   Pulse 88   Temp 36.7 °C (98.1 °F)   Resp 14   Ht 1.829 m (6')   Wt 77.4 kg (170 lb 9.6 oz)   SpO2 93%   BMI 23.14 kg/m²     Assessment / Plan:  Assessment & Plan  1. Chest pain due to CPR  The patient has a cracked sternum and a broken rib from CPR. He reports significant pain and has been prescribed oxycodone 10 mg to be taken twice daily for 14 days. A referral to pain management has been made for long-term pain management. He is advised to continue with breathing exercises to prevent pneumonia.  Controlled substance discussed with client. Discussed that patient is not to drive or operate machinery on this medicine, not to be used during working hours. Side effects of medication prescribed today were discussed with the patient including how to take the medications and proper dosage. Discussed repercussions of not taking the medication as prescribed. Instructed to call the office should she have any negative side effects or problems with the medication.    In prescribing controlled substances to this patient, I certify that I have obtained and reviewed the medical history of Christoph. I have also made a good leticia effort to obtain applicable records from other providers who have treated the patient and records did not demonstrate any increased risk of substance abuse that would prevent me from prescribing controlled substances.      I have conducted a physical exam and documented it. I have reviewed Christoph’s prescription history as maintained by the [place] Prescription Monitoring Program.      I have assessed the patient’s risk for abuse, dependency, and addiction using the validated Opioid Risk Tool available at https://www.mdcalc.com/kcddgq-lgof-zbdo-ort-narcotic-abuse.      Given the above, I believe the benefits of controlled substance therapy outweigh the risks. The reasons for prescribing controlled substances include non-narcotic, oral analgesic  alternatives have been inadequate for pain control. Accordingly, I have discussed the risk and benefits, treatment plan, and alternative therapies with the patient.     Obtained and reviewed patient utilization report from [facility] pharmacy database on 8/29/2024 prior to writing prescription for controlled substance II, III or IV per [place] bill . Based on assessment of the report, the prescription is medically necessary.     2. Diabetes Mellitus.  The patient requires better management of his insulin regimen. He currently has regular insulin but lacks long-acting insulin. A sliding scale for insulin dosing will be provided. He is advised to monitor his blood sugar levels and adjust insulin doses accordingly.    3. Post-Neck and Back Surgery.  The patient is experiencing weakness in his legs and arms, likely due to recent neck and back surgery performed by Dr. Pulido in early June. Physical therapy is recommended to help with strengthening. He has not yet had a follow-up with Dr. Pulido due to transportation issues. Referral placed to pain management.    4. Urinary Incontinence.  The patient reports episodes of urinary incontinence and a burning sensation. He recently completed a course of antibiotics for a UTI and is currently on antibiotics to prevent pneumonia. If symptoms persist, a referral to urology will be considered.    7. Mood changes  A note for an emotional support animal will be provided to help the patient keep his dog, which he misses greatly.

## 2024-09-05 DIAGNOSIS — E11.65 UNCONTROLLED TYPE 2 DIABETES MELLITUS WITH HYPERGLYCEMIA (HCC): ICD-10-CM

## 2024-09-05 RX ORDER — FLASH GLUCOSE SCANNING READER
EACH MISCELLANEOUS
Qty: 1 EACH | Refills: 0 | Status: SHIPPED | OUTPATIENT
Start: 2024-09-05

## 2024-09-05 RX ORDER — FLASH GLUCOSE SENSOR
1 KIT MISCELLANEOUS
Qty: 6 EACH | Refills: 3 | Status: SHIPPED | OUTPATIENT
Start: 2024-09-05

## 2024-09-16 ENCOUNTER — TELEPHONE (OUTPATIENT)
Dept: MEDICAL GROUP | Facility: LAB | Age: 59
End: 2024-09-16
Payer: COMMERCIAL

## 2024-09-19 ENCOUNTER — TELEPHONE (OUTPATIENT)
Dept: MEDICAL GROUP | Facility: LAB | Age: 59
End: 2024-09-19
Payer: COMMERCIAL

## 2024-09-19 NOTE — TELEPHONE ENCOUNTER
Patricia henderson/ [facility] calling to state patient has been admitted to  home healthcare.    Nursing is providing  PT/OTand she wants to bring in the medical social worker to see if there are things that they can help him with.    Contact # [phone]

## 2024-09-27 ENCOUNTER — TELEPHONE (OUTPATIENT)
Dept: MEDICAL GROUP | Facility: LAB | Age: 59
End: 2024-09-27

## 2024-09-27 NOTE — LETTER
9/27/2024        Christoph Taylor  [address]  [place] [address]        Dear Christoph,    Your care is very important to us, and we have noticed that on 9/27/2024, you missed your appointment with David Horowitz M.D at [facility]    We’re committed to providing you with the best care possible. Your appointment time is reserved for you and your provider to discuss any current or new health concerns and, together, determine the best plan of care for you. Please call [phone] to reschedule at your earliest convenience.    In some cases, [facility] offers additional resources to make your healthcare more accessible, including transportation assistance, financial assistance and virtual visits. To learn more about these resources, please call [phone].    In order to keep you as informed as possible, below is a brief summary of our policy regarding missed appointments:  If a patient “No Shows”  three (3) or more appointments within a rolling 12-month period, they may be dismissed from the practice for failure to follow clinician recommendations.     If you have any concerns regarding the care you are receiving, please talk with your provider or call the office at [phone] and request to speak with the Practice . We’re committed to providing excellent care, and your feedback is invaluable.    Sincerely,    [facility].

## 2024-10-03 DIAGNOSIS — M48.061 SPINAL STENOSIS OF LUMBAR REGION, UNSPECIFIED WHETHER NEUROGENIC CLAUDICATION PRESENT: ICD-10-CM

## 2024-10-03 DIAGNOSIS — M48.02 CERVICAL STENOSIS OF SPINAL CANAL: ICD-10-CM

## 2024-10-03 DIAGNOSIS — R07.89 OTHER CHEST PAIN: ICD-10-CM

## 2024-10-07 RX ORDER — OXYCODONE HYDROCHLORIDE 10 MG/1
10 TABLET ORAL EVERY 12 HOURS PRN
Qty: 28 TABLET | Refills: 0 | OUTPATIENT
Start: 2024-10-07 | End: 2024-10-21

## 2024-10-08 ENCOUNTER — OFFICE VISIT (OUTPATIENT)
Dept: MEDICAL GROUP | Facility: LAB | Age: 59
End: 2024-10-08
Payer: COMMERCIAL

## 2024-10-08 VITALS
HEART RATE: 76 BPM | WEIGHT: 170.6 LBS | BODY MASS INDEX: 23.11 KG/M2 | HEIGHT: 72 IN | SYSTOLIC BLOOD PRESSURE: 114 MMHG | RESPIRATION RATE: 14 BRPM | DIASTOLIC BLOOD PRESSURE: 52 MMHG | TEMPERATURE: 97.7 F | OXYGEN SATURATION: 94 %

## 2024-10-08 DIAGNOSIS — G89.29 CHRONIC FOOT PAIN, LEFT: ICD-10-CM

## 2024-10-08 DIAGNOSIS — M47.812 CERVICAL SPONDYLOSIS WITHOUT MYELOPATHY: ICD-10-CM

## 2024-10-08 DIAGNOSIS — M79.601 RIGHT ARM PAIN: ICD-10-CM

## 2024-10-08 DIAGNOSIS — G89.29 CHRONIC RIGHT SHOULDER PAIN: ICD-10-CM

## 2024-10-08 DIAGNOSIS — M25.511 CHRONIC RIGHT SHOULDER PAIN: ICD-10-CM

## 2024-10-08 DIAGNOSIS — M65.30 TRIGGER FINGER, UNSPECIFIED FINGER, UNSPECIFIED LATERALITY: ICD-10-CM

## 2024-10-08 DIAGNOSIS — R15.9 INCONTINENCE OF FECES, UNSPECIFIED FECAL INCONTINENCE TYPE: ICD-10-CM

## 2024-10-08 DIAGNOSIS — R93.7 ABNORMAL CT OF THORACIC SPINE: ICD-10-CM

## 2024-10-08 DIAGNOSIS — R53.1 GENERALIZED WEAKNESS: ICD-10-CM

## 2024-10-08 DIAGNOSIS — M48.061 SPINAL STENOSIS OF LUMBAR REGION, UNSPECIFIED WHETHER NEUROGENIC CLAUDICATION PRESENT: ICD-10-CM

## 2024-10-08 DIAGNOSIS — S22.000S COMPRESSION FRACTURE OF THORACIC VERTEBRA, UNSPECIFIED THORACIC VERTEBRAL LEVEL, SEQUELA: ICD-10-CM

## 2024-10-08 DIAGNOSIS — M79.672 CHRONIC FOOT PAIN, LEFT: ICD-10-CM

## 2024-10-08 DIAGNOSIS — G83.4 CAUDA EQUINA COMPRESSION (HCC): ICD-10-CM

## 2024-10-08 DIAGNOSIS — M54.6 PAIN IN THORACIC SPINE: ICD-10-CM

## 2024-10-08 DIAGNOSIS — M48.02 CERVICAL STENOSIS OF SPINAL CANAL: ICD-10-CM

## 2024-10-08 DIAGNOSIS — G89.18 ACUTE POSTOPERATIVE PAIN: ICD-10-CM

## 2024-10-08 DIAGNOSIS — M47.814 THORACIC SPONDYLOSIS WITHOUT MYELOPATHY: ICD-10-CM

## 2024-10-08 DIAGNOSIS — R26.89 FUNCTIONAL GAIT ABNORMALITY: ICD-10-CM

## 2024-10-08 DIAGNOSIS — E11.65 UNCONTROLLED TYPE 2 DIABETES MELLITUS WITH HYPERGLYCEMIA (HCC): ICD-10-CM

## 2024-10-08 PROBLEM — S22.000A COMPRESSION FRACTURE OF THORACIC VERTEBRA (HCC): Status: ACTIVE | Noted: 2022-11-30

## 2024-10-08 PROBLEM — S22.000A CLOSED WEDGE FRACTURE OF THORACIC VERTEBRA (HCC): Status: ACTIVE | Noted: 2022-11-13

## 2024-10-08 PROCEDURE — 3074F SYST BP LT 130 MM HG: CPT | Performed by: NURSE PRACTITIONER

## 2024-10-08 PROCEDURE — 3078F DIAST BP <80 MM HG: CPT | Performed by: NURSE PRACTITIONER

## 2024-10-08 PROCEDURE — 99214 OFFICE O/P EST MOD 30 MIN: CPT | Performed by: NURSE PRACTITIONER

## 2024-10-08 RX ORDER — CYCLOBENZAPRINE HCL 5 MG
5 TABLET ORAL 3 TIMES DAILY PRN
Qty: 45 TABLET | Refills: 0 | Status: SHIPPED | OUTPATIENT
Start: 2024-10-08

## 2024-10-08 RX ORDER — GABAPENTIN 100 MG/1
100 CAPSULE ORAL 3 TIMES DAILY
Qty: 90 CAPSULE | Refills: 3 | Status: SHIPPED | OUTPATIENT
Start: 2024-10-08

## 2024-10-08 ASSESSMENT — FIBROSIS 4 INDEX: FIB4 SCORE: 1.2

## 2024-10-13 ENCOUNTER — HOSPITAL ENCOUNTER (EMERGENCY)
Facility: MEDICAL CENTER | Age: 59
End: 2024-10-13
Attending: EMERGENCY MEDICINE
Payer: COMMERCIAL

## 2024-10-13 ENCOUNTER — APPOINTMENT (OUTPATIENT)
Dept: RADIOLOGY | Facility: MEDICAL CENTER | Age: 59
DRG: 638 | End: 2024-10-13
Attending: EMERGENCY MEDICINE
Payer: COMMERCIAL

## 2024-10-13 ENCOUNTER — HOSPITAL ENCOUNTER (INPATIENT)
Facility: MEDICAL CENTER | Age: 59
LOS: 2 days | DRG: 638 | End: 2024-10-15
Attending: EMERGENCY MEDICINE | Admitting: STUDENT IN AN ORGANIZED HEALTH CARE EDUCATION/TRAINING PROGRAM
Payer: COMMERCIAL

## 2024-10-13 ENCOUNTER — APPOINTMENT (OUTPATIENT)
Dept: RADIOLOGY | Facility: MEDICAL CENTER | Age: 59
End: 2024-10-13
Attending: EMERGENCY MEDICINE
Payer: COMMERCIAL

## 2024-10-13 VITALS
TEMPERATURE: 97.5 F | RESPIRATION RATE: 18 BRPM | HEART RATE: 98 BPM | BODY MASS INDEX: 23.03 KG/M2 | DIASTOLIC BLOOD PRESSURE: 81 MMHG | SYSTOLIC BLOOD PRESSURE: 140 MMHG | HEIGHT: 72 IN | OXYGEN SATURATION: 98 % | WEIGHT: 170 LBS

## 2024-10-13 DIAGNOSIS — I10 HYPERTENSION, UNSPECIFIED TYPE: ICD-10-CM

## 2024-10-13 DIAGNOSIS — N39.0 ACUTE UTI: ICD-10-CM

## 2024-10-13 DIAGNOSIS — R10.12 LEFT UPPER QUADRANT ABDOMINAL PAIN: Primary | ICD-10-CM

## 2024-10-13 DIAGNOSIS — E10.10 DIABETIC KETOACIDOSIS WITHOUT COMA ASSOCIATED WITH TYPE 1 DIABETES MELLITUS (HCC): ICD-10-CM

## 2024-10-13 DIAGNOSIS — R73.9 HYPERGLYCEMIA: ICD-10-CM

## 2024-10-13 DIAGNOSIS — R11.2 NAUSEA AND VOMITING, UNSPECIFIED VOMITING TYPE: ICD-10-CM

## 2024-10-13 DIAGNOSIS — E11.10 DKA, TYPE 2, NOT AT GOAL (HCC): ICD-10-CM

## 2024-10-13 DIAGNOSIS — E11.65 UNCONTROLLED TYPE 2 DIABETES MELLITUS WITH HYPERGLYCEMIA (HCC): ICD-10-CM

## 2024-10-13 DIAGNOSIS — N39.0 URINARY TRACT INFECTION WITHOUT HEMATURIA, SITE UNSPECIFIED: ICD-10-CM

## 2024-10-13 PROBLEM — N18.9 CKD (CHRONIC KIDNEY DISEASE): Status: ACTIVE | Noted: 2021-11-07

## 2024-10-13 PROBLEM — R10.32 LLQ ABDOMINAL PAIN: Status: ACTIVE | Noted: 2024-10-13

## 2024-10-13 LAB
ALBUMIN SERPL BCP-MCNC: 3.4 G/DL (ref 3.2–4.9)
ALBUMIN SERPL BCP-MCNC: 3.7 G/DL (ref 3.2–4.9)
ALBUMIN SERPL BCP-MCNC: 3.7 G/DL (ref 3.2–4.9)
ALBUMIN/GLOB SERPL: 1.3 G/DL
ALBUMIN/GLOB SERPL: 1.3 G/DL
ALBUMIN/GLOB SERPL: 1.4 G/DL
ALP SERPL-CCNC: 101 U/L (ref 30–99)
ALP SERPL-CCNC: 108 U/L (ref 30–99)
ALP SERPL-CCNC: 96 U/L (ref 30–99)
ALT SERPL-CCNC: 10 U/L (ref 2–50)
ALT SERPL-CCNC: 11 U/L (ref 2–50)
ALT SERPL-CCNC: 14 U/L (ref 2–50)
ANION GAP SERPL CALC-SCNC: 14 MMOL/L (ref 7–16)
ANION GAP SERPL CALC-SCNC: 21 MMOL/L (ref 7–16)
ANION GAP SERPL CALC-SCNC: 24 MMOL/L (ref 7–16)
ANION GAP SERPL CALC-SCNC: 30 MMOL/L (ref 7–16)
APPEARANCE UR: ABNORMAL
AST SERPL-CCNC: 13 U/L (ref 12–45)
AST SERPL-CCNC: 14 U/L (ref 12–45)
AST SERPL-CCNC: 14 U/L (ref 12–45)
B-OH-BUTYR SERPL-MCNC: >8 MMOL/L (ref 0.02–0.27)
BACTERIA #/AREA URNS HPF: ABNORMAL /HPF
BASOPHILS # BLD AUTO: 0.1 % (ref 0–1.8)
BASOPHILS # BLD AUTO: 0.1 % (ref 0–1.8)
BASOPHILS # BLD AUTO: 0.2 % (ref 0–1.8)
BASOPHILS # BLD: 0.01 K/UL (ref 0–0.12)
BASOPHILS # BLD: 0.01 K/UL (ref 0–0.12)
BASOPHILS # BLD: 0.02 K/UL (ref 0–0.12)
BILIRUB SERPL-MCNC: 0.4 MG/DL (ref 0.1–1.5)
BILIRUB SERPL-MCNC: 0.5 MG/DL (ref 0.1–1.5)
BILIRUB SERPL-MCNC: 0.5 MG/DL (ref 0.1–1.5)
BILIRUB UR QL STRIP.AUTO: NEGATIVE
BUN SERPL-MCNC: 26 MG/DL (ref 8–22)
BUN SERPL-MCNC: 35 MG/DL (ref 8–22)
BUN SERPL-MCNC: 36 MG/DL (ref 8–22)
BUN SERPL-MCNC: 38 MG/DL (ref 8–22)
CALCIUM ALBUM COR SERPL-MCNC: 9.4 MG/DL (ref 8.5–10.5)
CALCIUM ALBUM COR SERPL-MCNC: 9.6 MG/DL (ref 8.5–10.5)
CALCIUM ALBUM COR SERPL-MCNC: 9.7 MG/DL (ref 8.5–10.5)
CALCIUM SERPL-MCNC: 8.7 MG/DL (ref 8.5–10.5)
CALCIUM SERPL-MCNC: 8.9 MG/DL (ref 8.5–10.5)
CALCIUM SERPL-MCNC: 9.4 MG/DL (ref 8.5–10.5)
CALCIUM SERPL-MCNC: 9.5 MG/DL (ref 8.5–10.5)
CHLORIDE SERPL-SCNC: 89 MMOL/L (ref 96–112)
CHLORIDE SERPL-SCNC: 92 MMOL/L (ref 96–112)
CHLORIDE SERPL-SCNC: 95 MMOL/L (ref 96–112)
CHLORIDE SERPL-SCNC: 99 MMOL/L (ref 96–112)
CO2 SERPL-SCNC: 17 MMOL/L (ref 20–33)
CO2 SERPL-SCNC: 18 MMOL/L (ref 20–33)
CO2 SERPL-SCNC: 23 MMOL/L (ref 20–33)
CO2 SERPL-SCNC: 23 MMOL/L (ref 20–33)
COLOR UR: YELLOW
CREAT SERPL-MCNC: 0.72 MG/DL (ref 0.5–1.4)
CREAT SERPL-MCNC: 0.96 MG/DL (ref 0.5–1.4)
CREAT SERPL-MCNC: 1.02 MG/DL (ref 0.5–1.4)
CREAT SERPL-MCNC: 1.03 MG/DL (ref 0.5–1.4)
EKG IMPRESSION: NORMAL
EKG IMPRESSION: NORMAL
EOSINOPHIL # BLD AUTO: 0 K/UL (ref 0–0.51)
EOSINOPHIL # BLD AUTO: 0 K/UL (ref 0–0.51)
EOSINOPHIL # BLD AUTO: 0.02 K/UL (ref 0–0.51)
EOSINOPHIL NFR BLD: 0 % (ref 0–6.9)
EOSINOPHIL NFR BLD: 0 % (ref 0–6.9)
EOSINOPHIL NFR BLD: 0.2 % (ref 0–6.9)
EPI CELLS #/AREA URNS HPF: NEGATIVE /HPF
ERYTHROCYTE [DISTWIDTH] IN BLOOD BY AUTOMATED COUNT: 40.1 FL (ref 35.9–50)
ERYTHROCYTE [DISTWIDTH] IN BLOOD BY AUTOMATED COUNT: 40.9 FL (ref 35.9–50)
ERYTHROCYTE [DISTWIDTH] IN BLOOD BY AUTOMATED COUNT: 41 FL (ref 35.9–50)
GFR SERPLBLD CREATININE-BSD FMLA CKD-EPI: 104 ML/MIN/1.73 M 2
GFR SERPLBLD CREATININE-BSD FMLA CKD-EPI: 83 ML/MIN/1.73 M 2
GFR SERPLBLD CREATININE-BSD FMLA CKD-EPI: 84 ML/MIN/1.73 M 2
GFR SERPLBLD CREATININE-BSD FMLA CKD-EPI: 90 ML/MIN/1.73 M 2
GLOBULIN SER CALC-MCNC: 2.5 G/DL (ref 1.9–3.5)
GLOBULIN SER CALC-MCNC: 2.8 G/DL (ref 1.9–3.5)
GLOBULIN SER CALC-MCNC: 2.9 G/DL (ref 1.9–3.5)
GLUCOSE BLD STRIP.AUTO-MCNC: 396 MG/DL (ref 65–99)
GLUCOSE BLD STRIP.AUTO-MCNC: 397 MG/DL (ref 65–99)
GLUCOSE BLD STRIP.AUTO-MCNC: 425 MG/DL (ref 65–99)
GLUCOSE BLD STRIP.AUTO-MCNC: 434 MG/DL (ref 65–99)
GLUCOSE BLD STRIP.AUTO-MCNC: 498 MG/DL (ref 65–99)
GLUCOSE SERPL-MCNC: 332 MG/DL (ref 65–99)
GLUCOSE SERPL-MCNC: 420 MG/DL (ref 65–99)
GLUCOSE SERPL-MCNC: 510 MG/DL (ref 65–99)
GLUCOSE SERPL-MCNC: 554 MG/DL (ref 65–99)
GLUCOSE UR STRIP.AUTO-MCNC: 500 MG/DL
HCT VFR BLD AUTO: 37.2 % (ref 42–52)
HCT VFR BLD AUTO: 41.2 % (ref 42–52)
HCT VFR BLD AUTO: 41.8 % (ref 42–52)
HGB BLD-MCNC: 12.5 G/DL (ref 14–18)
HGB BLD-MCNC: 13.4 G/DL (ref 14–18)
HGB BLD-MCNC: 14 G/DL (ref 14–18)
HYALINE CASTS #/AREA URNS LPF: ABNORMAL /LPF
IMM GRANULOCYTES # BLD AUTO: 0.05 K/UL (ref 0–0.11)
IMM GRANULOCYTES # BLD AUTO: 0.05 K/UL (ref 0–0.11)
IMM GRANULOCYTES # BLD AUTO: 0.08 K/UL (ref 0–0.11)
IMM GRANULOCYTES NFR BLD AUTO: 0.4 % (ref 0–0.9)
IMM GRANULOCYTES NFR BLD AUTO: 0.5 % (ref 0–0.9)
IMM GRANULOCYTES NFR BLD AUTO: 0.6 % (ref 0–0.9)
KETONES UR STRIP.AUTO-MCNC: 80 MG/DL
LACTATE SERPL-SCNC: 1.7 MMOL/L (ref 0.5–2)
LEUKOCYTE ESTERASE UR QL STRIP.AUTO: ABNORMAL
LIPASE SERPL-CCNC: 10 U/L (ref 11–82)
LIPASE SERPL-CCNC: 20 U/L (ref 11–82)
LYMPHOCYTES # BLD AUTO: 0.51 K/UL (ref 1–4.8)
LYMPHOCYTES # BLD AUTO: 0.6 K/UL (ref 1–4.8)
LYMPHOCYTES # BLD AUTO: 0.76 K/UL (ref 1–4.8)
LYMPHOCYTES NFR BLD: 4.4 % (ref 22–41)
LYMPHOCYTES NFR BLD: 4.7 % (ref 22–41)
LYMPHOCYTES NFR BLD: 8.2 % (ref 22–41)
MAGNESIUM SERPL-MCNC: 1.9 MG/DL (ref 1.5–2.5)
MCH RBC QN AUTO: 26.9 PG (ref 27–33)
MCH RBC QN AUTO: 27.1 PG (ref 27–33)
MCH RBC QN AUTO: 27.7 PG (ref 27–33)
MCHC RBC AUTO-ENTMCNC: 32.5 G/DL (ref 32.3–36.5)
MCHC RBC AUTO-ENTMCNC: 33.5 G/DL (ref 32.3–36.5)
MCHC RBC AUTO-ENTMCNC: 33.6 G/DL (ref 32.3–36.5)
MCV RBC AUTO: 81 FL (ref 81.4–97.8)
MCV RBC AUTO: 82.3 FL (ref 81.4–97.8)
MCV RBC AUTO: 82.7 FL (ref 81.4–97.8)
MICRO URNS: ABNORMAL
MONOCYTES # BLD AUTO: 0.25 K/UL (ref 0–0.85)
MONOCYTES # BLD AUTO: 0.34 K/UL (ref 0–0.85)
MONOCYTES # BLD AUTO: 0.37 K/UL (ref 0–0.85)
MONOCYTES NFR BLD AUTO: 2.7 % (ref 0–13.4)
MONOCYTES NFR BLD AUTO: 2.7 % (ref 0–13.4)
MONOCYTES NFR BLD AUTO: 3.2 % (ref 0–13.4)
NEUTROPHILS # BLD AUTO: 10.55 K/UL (ref 1.82–7.42)
NEUTROPHILS # BLD AUTO: 11.78 K/UL (ref 1.82–7.42)
NEUTROPHILS # BLD AUTO: 8.17 K/UL (ref 1.82–7.42)
NEUTROPHILS NFR BLD: 88.3 % (ref 44–72)
NEUTROPHILS NFR BLD: 91.8 % (ref 44–72)
NEUTROPHILS NFR BLD: 91.9 % (ref 44–72)
NITRITE UR QL STRIP.AUTO: NEGATIVE
NRBC # BLD AUTO: 0 K/UL
NRBC BLD-RTO: 0 /100 WBC (ref 0–0.2)
PH UR STRIP.AUTO: 8 [PH] (ref 5–8)
PHOSPHATE SERPL-MCNC: 4.7 MG/DL (ref 2.5–4.5)
PLATELET # BLD AUTO: 205 K/UL (ref 164–446)
PLATELET # BLD AUTO: 214 K/UL (ref 164–446)
PLATELET # BLD AUTO: 228 K/UL (ref 164–446)
PMV BLD AUTO: 9.5 FL (ref 9–12.9)
PMV BLD AUTO: 9.6 FL (ref 9–12.9)
PMV BLD AUTO: 9.7 FL (ref 9–12.9)
POTASSIUM SERPL-SCNC: 4.2 MMOL/L (ref 3.6–5.5)
POTASSIUM SERPL-SCNC: 4.3 MMOL/L (ref 3.6–5.5)
POTASSIUM SERPL-SCNC: 4.3 MMOL/L (ref 3.6–5.5)
POTASSIUM SERPL-SCNC: 4.7 MMOL/L (ref 3.6–5.5)
PROT SERPL-MCNC: 5.9 G/DL (ref 6–8.2)
PROT SERPL-MCNC: 6.5 G/DL (ref 6–8.2)
PROT SERPL-MCNC: 6.6 G/DL (ref 6–8.2)
PROT UR QL STRIP: 100 MG/DL
RBC # BLD AUTO: 4.52 M/UL (ref 4.7–6.1)
RBC # BLD AUTO: 4.98 M/UL (ref 4.7–6.1)
RBC # BLD AUTO: 5.16 M/UL (ref 4.7–6.1)
RBC # URNS HPF: ABNORMAL /HPF
RBC UR QL AUTO: ABNORMAL
SODIUM SERPL-SCNC: 136 MMOL/L (ref 135–145)
SODIUM SERPL-SCNC: 137 MMOL/L (ref 135–145)
SP GR UR STRIP.AUTO: 1.03
TROPONIN T SERPL-MCNC: 24 NG/L (ref 6–19)
TROPONIN T SERPL-MCNC: 56 NG/L (ref 6–19)
UROBILINOGEN UR STRIP.AUTO-MCNC: 0.2 MG/DL
WBC # BLD AUTO: 11.5 K/UL (ref 4.8–10.8)
WBC # BLD AUTO: 12.8 K/UL (ref 4.8–10.8)
WBC # BLD AUTO: 9.3 K/UL (ref 4.8–10.8)
WBC #/AREA URNS HPF: ABNORMAL /HPF

## 2024-10-13 PROCEDURE — 700102 HCHG RX REV CODE 250 W/ 637 OVERRIDE(OP): Performed by: EMERGENCY MEDICINE

## 2024-10-13 PROCEDURE — 700105 HCHG RX REV CODE 258: Performed by: STUDENT IN AN ORGANIZED HEALTH CARE EDUCATION/TRAINING PROGRAM

## 2024-10-13 PROCEDURE — 700105 HCHG RX REV CODE 258: Performed by: EMERGENCY MEDICINE

## 2024-10-13 PROCEDURE — 96375 TX/PRO/DX INJ NEW DRUG ADDON: CPT

## 2024-10-13 PROCEDURE — 96376 TX/PRO/DX INJ SAME DRUG ADON: CPT

## 2024-10-13 PROCEDURE — 99291 CRITICAL CARE FIRST HOUR: CPT

## 2024-10-13 PROCEDURE — 770022 HCHG ROOM/CARE - ICU (200)

## 2024-10-13 PROCEDURE — 81001 URINALYSIS AUTO W/SCOPE: CPT

## 2024-10-13 PROCEDURE — 700111 HCHG RX REV CODE 636 W/ 250 OVERRIDE (IP): Mod: JZ | Performed by: EMERGENCY MEDICINE

## 2024-10-13 PROCEDURE — A9270 NON-COVERED ITEM OR SERVICE: HCPCS | Performed by: EMERGENCY MEDICINE

## 2024-10-13 PROCEDURE — 700111 HCHG RX REV CODE 636 W/ 250 OVERRIDE (IP): Performed by: STUDENT IN AN ORGANIZED HEALTH CARE EDUCATION/TRAINING PROGRAM

## 2024-10-13 PROCEDURE — 87086 URINE CULTURE/COLONY COUNT: CPT

## 2024-10-13 PROCEDURE — 84484 ASSAY OF TROPONIN QUANT: CPT

## 2024-10-13 PROCEDURE — 80048 BASIC METABOLIC PNL TOTAL CA: CPT

## 2024-10-13 PROCEDURE — 93005 ELECTROCARDIOGRAM TRACING: CPT | Performed by: EMERGENCY MEDICINE

## 2024-10-13 PROCEDURE — 83735 ASSAY OF MAGNESIUM: CPT

## 2024-10-13 PROCEDURE — 36415 COLL VENOUS BLD VENIPUNCTURE: CPT

## 2024-10-13 PROCEDURE — 80053 COMPREHEN METABOLIC PANEL: CPT

## 2024-10-13 PROCEDURE — 96374 THER/PROPH/DIAG INJ IV PUSH: CPT

## 2024-10-13 PROCEDURE — 87077 CULTURE AEROBIC IDENTIFY: CPT

## 2024-10-13 PROCEDURE — 82962 GLUCOSE BLOOD TEST: CPT | Mod: 91

## 2024-10-13 PROCEDURE — 85025 COMPLETE CBC W/AUTO DIFF WBC: CPT

## 2024-10-13 PROCEDURE — 700102 HCHG RX REV CODE 250 W/ 637 OVERRIDE(OP): Performed by: STUDENT IN AN ORGANIZED HEALTH CARE EDUCATION/TRAINING PROGRAM

## 2024-10-13 PROCEDURE — 84100 ASSAY OF PHOSPHORUS: CPT

## 2024-10-13 PROCEDURE — 80053 COMPREHEN METABOLIC PANEL: CPT | Mod: 91

## 2024-10-13 PROCEDURE — 83605 ASSAY OF LACTIC ACID: CPT

## 2024-10-13 PROCEDURE — 99291 CRITICAL CARE FIRST HOUR: CPT | Performed by: STUDENT IN AN ORGANIZED HEALTH CARE EDUCATION/TRAINING PROGRAM

## 2024-10-13 PROCEDURE — 71045 X-RAY EXAM CHEST 1 VIEW: CPT

## 2024-10-13 PROCEDURE — 700117 HCHG RX CONTRAST REV CODE 255: Performed by: EMERGENCY MEDICINE

## 2024-10-13 PROCEDURE — 87040 BLOOD CULTURE FOR BACTERIA: CPT

## 2024-10-13 PROCEDURE — 82010 KETONE BODYS QUAN: CPT

## 2024-10-13 PROCEDURE — 74177 CT ABD & PELVIS W/CONTRAST: CPT | Performed by: RADIOLOGY

## 2024-10-13 PROCEDURE — 85025 COMPLETE CBC W/AUTO DIFF WBC: CPT | Mod: 91

## 2024-10-13 PROCEDURE — 99285 EMERGENCY DEPT VISIT HI MDM: CPT

## 2024-10-13 PROCEDURE — 83690 ASSAY OF LIPASE: CPT | Mod: 91

## 2024-10-13 PROCEDURE — 84484 ASSAY OF TROPONIN QUANT: CPT | Mod: 91

## 2024-10-13 PROCEDURE — 82962 GLUCOSE BLOOD TEST: CPT

## 2024-10-13 PROCEDURE — 83690 ASSAY OF LIPASE: CPT

## 2024-10-13 PROCEDURE — 87186 SC STD MICRODIL/AGAR DIL: CPT

## 2024-10-13 PROCEDURE — 74177 CT ABD & PELVIS W/CONTRAST: CPT

## 2024-10-13 RX ORDER — SODIUM CHLORIDE, SODIUM LACTATE, POTASSIUM CHLORIDE, CALCIUM CHLORIDE 600; 310; 30; 20 MG/100ML; MG/100ML; MG/100ML; MG/100ML
1000 INJECTION, SOLUTION INTRAVENOUS ONCE
Status: COMPLETED | OUTPATIENT
Start: 2024-10-13 | End: 2024-10-13

## 2024-10-13 RX ORDER — HYDROMORPHONE HYDROCHLORIDE 1 MG/ML
.5-1 INJECTION, SOLUTION INTRAMUSCULAR; INTRAVENOUS; SUBCUTANEOUS
Status: DISCONTINUED | OUTPATIENT
Start: 2024-10-13 | End: 2024-10-14

## 2024-10-13 RX ORDER — OXYCODONE HYDROCHLORIDE 5 MG/1
5 TABLET ORAL EVERY 4 HOURS PRN
Status: DISCONTINUED | OUTPATIENT
Start: 2024-10-13 | End: 2024-10-15 | Stop reason: HOSPADM

## 2024-10-13 RX ORDER — POLYETHYLENE GLYCOL 3350 17 G/17G
1 POWDER, FOR SOLUTION ORAL
Status: DISCONTINUED | OUTPATIENT
Start: 2024-10-13 | End: 2024-10-15 | Stop reason: HOSPADM

## 2024-10-13 RX ORDER — OXYCODONE HYDROCHLORIDE 5 MG/1
5 TABLET ORAL EVERY 8 HOURS PRN
Status: ON HOLD | COMMUNITY
End: 2024-10-15

## 2024-10-13 RX ORDER — MORPHINE SULFATE 4 MG/ML
4 INJECTION INTRAVENOUS ONCE
Status: COMPLETED | OUTPATIENT
Start: 2024-10-13 | End: 2024-10-13

## 2024-10-13 RX ORDER — CEFTRIAXONE 2 G/1
2000 INJECTION, POWDER, FOR SOLUTION INTRAMUSCULAR; INTRAVENOUS ONCE
Status: COMPLETED | OUTPATIENT
Start: 2024-10-13 | End: 2024-10-13

## 2024-10-13 RX ORDER — DEXTROSE AND SODIUM CHLORIDE 10; .45 G/100ML; G/100ML
INJECTION, SOLUTION INTRAVENOUS CONTINUOUS
Status: ACTIVE | OUTPATIENT
Start: 2024-10-13 | End: 2024-10-14

## 2024-10-13 RX ORDER — MAGNESIUM SULFATE HEPTAHYDRATE 40 MG/ML
4 INJECTION, SOLUTION INTRAVENOUS
Status: COMPLETED | OUTPATIENT
Start: 2024-10-13 | End: 2024-10-13

## 2024-10-13 RX ORDER — ONDANSETRON 2 MG/ML
4 INJECTION INTRAMUSCULAR; INTRAVENOUS ONCE
Status: COMPLETED | OUTPATIENT
Start: 2024-10-13 | End: 2024-10-13

## 2024-10-13 RX ORDER — AMOXICILLIN 250 MG
2 CAPSULE ORAL EVERY EVENING
Status: DISCONTINUED | OUTPATIENT
Start: 2024-10-13 | End: 2024-10-15 | Stop reason: HOSPADM

## 2024-10-13 RX ORDER — ACETAMINOPHEN 325 MG/1
650 TABLET ORAL ONCE
Status: COMPLETED | OUTPATIENT
Start: 2024-10-13 | End: 2024-10-13

## 2024-10-13 RX ORDER — LIDOCAINE 4 G/G
3 PATCH TOPICAL EVERY 24 HOURS
Status: DISCONTINUED | OUTPATIENT
Start: 2024-10-13 | End: 2024-10-15 | Stop reason: HOSPADM

## 2024-10-13 RX ORDER — KETOROLAC TROMETHAMINE 15 MG/ML
15 INJECTION, SOLUTION INTRAMUSCULAR; INTRAVENOUS ONCE
Status: COMPLETED | OUTPATIENT
Start: 2024-10-13 | End: 2024-10-13

## 2024-10-13 RX ORDER — DEXTROSE, SODIUM CHLORIDE, SODIUM LACTATE, POTASSIUM CHLORIDE, AND CALCIUM CHLORIDE 5; .6; .31; .03; .02 G/100ML; G/100ML; G/100ML; G/100ML; G/100ML
INJECTION, SOLUTION INTRAVENOUS CONTINUOUS
Status: DISCONTINUED | OUTPATIENT
Start: 2024-10-13 | End: 2024-10-14

## 2024-10-13 RX ORDER — SODIUM CHLORIDE, SODIUM LACTATE, POTASSIUM CHLORIDE, AND CALCIUM CHLORIDE .6; .31; .03; .02 G/100ML; G/100ML; G/100ML; G/100ML
2000 INJECTION, SOLUTION INTRAVENOUS ONCE
Status: COMPLETED | OUTPATIENT
Start: 2024-10-13 | End: 2024-10-13

## 2024-10-13 RX ORDER — ACETAMINOPHEN 500 MG
1000 TABLET ORAL EVERY 6 HOURS
Status: DISCONTINUED | OUTPATIENT
Start: 2024-10-13 | End: 2024-10-15 | Stop reason: HOSPADM

## 2024-10-13 RX ORDER — POTASSIUM CHLORIDE 7.45 MG/ML
10 INJECTION INTRAVENOUS
Status: COMPLETED | OUTPATIENT
Start: 2024-10-13 | End: 2024-10-13

## 2024-10-13 RX ORDER — SODIUM CHLORIDE, SODIUM LACTATE, POTASSIUM CHLORIDE, CALCIUM CHLORIDE 600; 310; 30; 20 MG/100ML; MG/100ML; MG/100ML; MG/100ML
INJECTION, SOLUTION INTRAVENOUS CONTINUOUS
Status: DISCONTINUED | OUTPATIENT
Start: 2024-10-13 | End: 2024-10-14

## 2024-10-13 RX ORDER — MAGNESIUM SULFATE HEPTAHYDRATE 40 MG/ML
2 INJECTION, SOLUTION INTRAVENOUS
Status: COMPLETED | OUTPATIENT
Start: 2024-10-13 | End: 2024-10-13

## 2024-10-13 RX ORDER — HEPARIN SODIUM 5000 [USP'U]/ML
5000 INJECTION, SOLUTION INTRAVENOUS; SUBCUTANEOUS EVERY 8 HOURS
Status: DISCONTINUED | OUTPATIENT
Start: 2024-10-13 | End: 2024-10-15 | Stop reason: HOSPADM

## 2024-10-13 RX ADMIN — POTASSIUM CHLORIDE 10 MEQ: 7.46 INJECTION, SOLUTION INTRAVENOUS at 21:36

## 2024-10-13 RX ADMIN — ACETAMINOPHEN 650 MG: 325 TABLET ORAL at 07:07

## 2024-10-13 RX ADMIN — SODIUM CHLORIDE 7 UNITS/HR: 9 INJECTION, SOLUTION INTRAVENOUS at 19:01

## 2024-10-13 RX ADMIN — IOHEXOL 100 ML: 350 INJECTION, SOLUTION INTRAVENOUS at 04:25

## 2024-10-13 RX ADMIN — INSULIN HUMAN 5 UNITS: 100 INJECTION, SOLUTION PARENTERAL at 17:56

## 2024-10-13 RX ADMIN — LIDOCAINE HYDROCHLORIDE 30 ML: 20 SOLUTION ORAL; TOPICAL at 16:04

## 2024-10-13 RX ADMIN — MORPHINE SULFATE 4 MG: 4 INJECTION, SOLUTION INTRAMUSCULAR; INTRAVENOUS at 17:17

## 2024-10-13 RX ADMIN — POTASSIUM CHLORIDE 10 MEQ: 7.46 INJECTION, SOLUTION INTRAVENOUS at 19:58

## 2024-10-13 RX ADMIN — ONDANSETRON 4 MG: 2 INJECTION INTRAMUSCULAR; INTRAVENOUS at 03:30

## 2024-10-13 RX ADMIN — MAGNESIUM SULFATE HEPTAHYDRATE 2 G: 2 INJECTION, SOLUTION INTRAVENOUS at 21:43

## 2024-10-13 RX ADMIN — HYDROMORPHONE HYDROCHLORIDE 0.5 MG: 1 INJECTION, SOLUTION INTRAMUSCULAR; INTRAVENOUS; SUBCUTANEOUS at 19:44

## 2024-10-13 RX ADMIN — CEFTRIAXONE SODIUM 2000 MG: 2 INJECTION, POWDER, FOR SOLUTION INTRAMUSCULAR; INTRAVENOUS at 18:31

## 2024-10-13 RX ADMIN — MORPHINE SULFATE 4 MG: 4 INJECTION, SOLUTION INTRAMUSCULAR; INTRAVENOUS at 03:06

## 2024-10-13 RX ADMIN — SODIUM CHLORIDE, POTASSIUM CHLORIDE, SODIUM LACTATE AND CALCIUM CHLORIDE 1000 ML: 600; 310; 30; 20 INJECTION, SOLUTION INTRAVENOUS at 16:08

## 2024-10-13 RX ADMIN — SODIUM CHLORIDE, POTASSIUM CHLORIDE, SODIUM LACTATE AND CALCIUM CHLORIDE: 600; 310; 30; 20 INJECTION, SOLUTION INTRAVENOUS at 19:12

## 2024-10-13 RX ADMIN — MORPHINE SULFATE 4 MG: 4 INJECTION, SOLUTION INTRAMUSCULAR; INTRAVENOUS at 18:01

## 2024-10-13 RX ADMIN — KETOROLAC TROMETHAMINE 15 MG: 15 INJECTION, SOLUTION INTRAMUSCULAR; INTRAVENOUS at 05:07

## 2024-10-13 RX ADMIN — SODIUM CHLORIDE, POTASSIUM CHLORIDE, SODIUM LACTATE AND CALCIUM CHLORIDE 2000 ML: 600; 310; 30; 20 INJECTION, SOLUTION INTRAVENOUS at 19:38

## 2024-10-13 RX ADMIN — INSULIN HUMAN 5 UNITS: 100 INJECTION, SOLUTION PARENTERAL at 04:15

## 2024-10-13 RX ADMIN — SODIUM CHLORIDE, POTASSIUM CHLORIDE, SODIUM LACTATE AND CALCIUM CHLORIDE 1000 ML: 600; 310; 30; 20 INJECTION, SOLUTION INTRAVENOUS at 17:21

## 2024-10-13 ASSESSMENT — FIBROSIS 4 INDEX
FIB4 SCORE: 1.18
FIB4 SCORE: 1.02
FIB4 SCORE: 1.18

## 2024-10-13 ASSESSMENT — PAIN DESCRIPTION - PAIN TYPE
TYPE: ACUTE PAIN
TYPE: ACUTE PAIN

## 2024-10-14 PROBLEM — E87.8 ELECTROLYTE DISTURBANCE: Status: ACTIVE | Noted: 2024-10-14

## 2024-10-14 LAB
ANION GAP SERPL CALC-SCNC: 10 MMOL/L (ref 7–16)
ANION GAP SERPL CALC-SCNC: 10 MMOL/L (ref 7–16)
ANION GAP SERPL CALC-SCNC: 7 MMOL/L (ref 7–16)
BASOPHILS # BLD AUTO: 0.1 % (ref 0–1.8)
BASOPHILS # BLD: 0.01 K/UL (ref 0–0.12)
BUN SERPL-MCNC: 24 MG/DL (ref 8–22)
BUN SERPL-MCNC: 30 MG/DL (ref 8–22)
BUN SERPL-MCNC: 36 MG/DL (ref 8–22)
CA-I SERPL-SCNC: 1.2 MMOL/L (ref 1.1–1.3)
CALCIUM SERPL-MCNC: 5.3 MG/DL (ref 8.5–10.5)
CALCIUM SERPL-MCNC: 8.5 MG/DL (ref 8.5–10.5)
CALCIUM SERPL-MCNC: 9.1 MG/DL (ref 8.5–10.5)
CHLORIDE SERPL-SCNC: 100 MMOL/L (ref 96–112)
CHLORIDE SERPL-SCNC: 102 MMOL/L (ref 96–112)
CHLORIDE SERPL-SCNC: 115 MMOL/L (ref 96–112)
CO2 SERPL-SCNC: 18 MMOL/L (ref 20–33)
CO2 SERPL-SCNC: 25 MMOL/L (ref 20–33)
CO2 SERPL-SCNC: 27 MMOL/L (ref 20–33)
CREAT SERPL-MCNC: 0.57 MG/DL (ref 0.5–1.4)
CREAT SERPL-MCNC: 0.94 MG/DL (ref 0.5–1.4)
CREAT SERPL-MCNC: 0.94 MG/DL (ref 0.5–1.4)
EOSINOPHIL # BLD AUTO: 0.01 K/UL (ref 0–0.51)
EOSINOPHIL NFR BLD: 0.1 % (ref 0–6.9)
ERYTHROCYTE [DISTWIDTH] IN BLOOD BY AUTOMATED COUNT: 43.5 FL (ref 35.9–50)
GFR SERPLBLD CREATININE-BSD FMLA CKD-EPI: 112 ML/MIN/1.73 M 2
GFR SERPLBLD CREATININE-BSD FMLA CKD-EPI: 93 ML/MIN/1.73 M 2
GFR SERPLBLD CREATININE-BSD FMLA CKD-EPI: 93 ML/MIN/1.73 M 2
GLUCOSE BLD STRIP.AUTO-MCNC: 106 MG/DL (ref 65–99)
GLUCOSE BLD STRIP.AUTO-MCNC: 143 MG/DL (ref 65–99)
GLUCOSE BLD STRIP.AUTO-MCNC: 146 MG/DL (ref 65–99)
GLUCOSE BLD STRIP.AUTO-MCNC: 150 MG/DL (ref 65–99)
GLUCOSE BLD STRIP.AUTO-MCNC: 169 MG/DL (ref 65–99)
GLUCOSE BLD STRIP.AUTO-MCNC: 195 MG/DL (ref 65–99)
GLUCOSE BLD STRIP.AUTO-MCNC: 196 MG/DL (ref 65–99)
GLUCOSE BLD STRIP.AUTO-MCNC: 217 MG/DL (ref 65–99)
GLUCOSE BLD STRIP.AUTO-MCNC: 224 MG/DL (ref 65–99)
GLUCOSE BLD STRIP.AUTO-MCNC: 229 MG/DL (ref 65–99)
GLUCOSE BLD STRIP.AUTO-MCNC: 232 MG/DL (ref 65–99)
GLUCOSE BLD STRIP.AUTO-MCNC: 235 MG/DL (ref 65–99)
GLUCOSE BLD STRIP.AUTO-MCNC: 296 MG/DL (ref 65–99)
GLUCOSE BLD STRIP.AUTO-MCNC: 320 MG/DL (ref 65–99)
GLUCOSE BLD STRIP.AUTO-MCNC: 334 MG/DL (ref 65–99)
GLUCOSE BLD STRIP.AUTO-MCNC: 372 MG/DL (ref 65–99)
GLUCOSE BLD STRIP.AUTO-MCNC: 82 MG/DL (ref 65–99)
GLUCOSE BLD STRIP.AUTO-MCNC: 86 MG/DL (ref 65–99)
GLUCOSE SERPL-MCNC: 153 MG/DL (ref 65–99)
GLUCOSE SERPL-MCNC: 174 MG/DL (ref 65–99)
GLUCOSE SERPL-MCNC: 241 MG/DL (ref 65–99)
HCT VFR BLD AUTO: 33.6 % (ref 42–52)
HGB BLD-MCNC: 11 G/DL (ref 14–18)
IMM GRANULOCYTES # BLD AUTO: 0.08 K/UL (ref 0–0.11)
IMM GRANULOCYTES NFR BLD AUTO: 0.7 % (ref 0–0.9)
LYMPHOCYTES # BLD AUTO: 0.9 K/UL (ref 1–4.8)
LYMPHOCYTES NFR BLD: 8 % (ref 22–41)
MAGNESIUM SERPL-MCNC: 1.5 MG/DL (ref 1.5–2.5)
MAGNESIUM SERPL-MCNC: 2.4 MG/DL (ref 1.5–2.5)
MAGNESIUM SERPL-MCNC: 2.6 MG/DL (ref 1.5–2.5)
MCH RBC QN AUTO: 27.5 PG (ref 27–33)
MCHC RBC AUTO-ENTMCNC: 32.7 G/DL (ref 32.3–36.5)
MCV RBC AUTO: 84 FL (ref 81.4–97.8)
MONOCYTES # BLD AUTO: 0.48 K/UL (ref 0–0.85)
MONOCYTES NFR BLD AUTO: 4.3 % (ref 0–13.4)
NEUTROPHILS # BLD AUTO: 9.72 K/UL (ref 1.82–7.42)
NEUTROPHILS NFR BLD: 86.8 % (ref 44–72)
NRBC # BLD AUTO: 0 K/UL
NRBC BLD-RTO: 0 /100 WBC (ref 0–0.2)
PHOSPHATE SERPL-MCNC: 1.5 MG/DL (ref 2.5–4.5)
PHOSPHATE SERPL-MCNC: 2.5 MG/DL (ref 2.5–4.5)
PLATELET # BLD AUTO: 184 K/UL (ref 164–446)
PMV BLD AUTO: 10.7 FL (ref 9–12.9)
POTASSIUM SERPL-SCNC: 3.1 MMOL/L (ref 3.6–5.5)
POTASSIUM SERPL-SCNC: 4.3 MMOL/L (ref 3.6–5.5)
POTASSIUM SERPL-SCNC: 4.7 MMOL/L (ref 3.6–5.5)
PROCALCITONIN SERPL-MCNC: 0.08 NG/ML
RBC # BLD AUTO: 4 M/UL (ref 4.7–6.1)
SODIUM SERPL-SCNC: 137 MMOL/L (ref 135–145)
SODIUM SERPL-SCNC: 137 MMOL/L (ref 135–145)
SODIUM SERPL-SCNC: 140 MMOL/L (ref 135–145)
WBC # BLD AUTO: 11.2 K/UL (ref 4.8–10.8)

## 2024-10-14 PROCEDURE — 700111 HCHG RX REV CODE 636 W/ 250 OVERRIDE (IP): Performed by: STUDENT IN AN ORGANIZED HEALTH CARE EDUCATION/TRAINING PROGRAM

## 2024-10-14 PROCEDURE — 83735 ASSAY OF MAGNESIUM: CPT

## 2024-10-14 PROCEDURE — A9270 NON-COVERED ITEM OR SERVICE: HCPCS | Performed by: INTERNAL MEDICINE

## 2024-10-14 PROCEDURE — 82330 ASSAY OF CALCIUM: CPT

## 2024-10-14 PROCEDURE — 700105 HCHG RX REV CODE 258: Performed by: STUDENT IN AN ORGANIZED HEALTH CARE EDUCATION/TRAINING PROGRAM

## 2024-10-14 PROCEDURE — 85025 COMPLETE CBC W/AUTO DIFF WBC: CPT

## 2024-10-14 PROCEDURE — 700111 HCHG RX REV CODE 636 W/ 250 OVERRIDE (IP): Performed by: NURSE PRACTITIONER

## 2024-10-14 PROCEDURE — 700102 HCHG RX REV CODE 250 W/ 637 OVERRIDE(OP): Performed by: STUDENT IN AN ORGANIZED HEALTH CARE EDUCATION/TRAINING PROGRAM

## 2024-10-14 PROCEDURE — 700111 HCHG RX REV CODE 636 W/ 250 OVERRIDE (IP): Mod: JZ | Performed by: NURSE PRACTITIONER

## 2024-10-14 PROCEDURE — 700101 HCHG RX REV CODE 250: Performed by: STUDENT IN AN ORGANIZED HEALTH CARE EDUCATION/TRAINING PROGRAM

## 2024-10-14 PROCEDURE — A9270 NON-COVERED ITEM OR SERVICE: HCPCS | Performed by: STUDENT IN AN ORGANIZED HEALTH CARE EDUCATION/TRAINING PROGRAM

## 2024-10-14 PROCEDURE — 80048 BASIC METABOLIC PNL TOTAL CA: CPT

## 2024-10-14 PROCEDURE — 99233 SBSQ HOSP IP/OBS HIGH 50: CPT | Performed by: INTERNAL MEDICINE

## 2024-10-14 PROCEDURE — 700105 HCHG RX REV CODE 258: Performed by: INTERNAL MEDICINE

## 2024-10-14 PROCEDURE — 307059 PAD,EAR PROTECTOR: Performed by: INTERNAL MEDICINE

## 2024-10-14 PROCEDURE — 82962 GLUCOSE BLOOD TEST: CPT | Mod: 91

## 2024-10-14 PROCEDURE — 700105 HCHG RX REV CODE 258: Performed by: NURSE PRACTITIONER

## 2024-10-14 PROCEDURE — 700102 HCHG RX REV CODE 250 W/ 637 OVERRIDE(OP): Performed by: INTERNAL MEDICINE

## 2024-10-14 PROCEDURE — 99223 1ST HOSP IP/OBS HIGH 75: CPT | Performed by: INTERNAL MEDICINE

## 2024-10-14 PROCEDURE — 97602 WOUND(S) CARE NON-SELECTIVE: CPT

## 2024-10-14 PROCEDURE — 700111 HCHG RX REV CODE 636 W/ 250 OVERRIDE (IP): Mod: JZ | Performed by: INTERNAL MEDICINE

## 2024-10-14 PROCEDURE — 87040 BLOOD CULTURE FOR BACTERIA: CPT

## 2024-10-14 PROCEDURE — 770006 HCHG ROOM/CARE - MED/SURG/GYN SEMI*

## 2024-10-14 PROCEDURE — 84145 PROCALCITONIN (PCT): CPT

## 2024-10-14 PROCEDURE — 84100 ASSAY OF PHOSPHORUS: CPT | Mod: 91

## 2024-10-14 RX ORDER — SODIUM CHLORIDE, SODIUM LACTATE, POTASSIUM CHLORIDE, AND CALCIUM CHLORIDE .6; .31; .03; .02 G/100ML; G/100ML; G/100ML; G/100ML
500 INJECTION, SOLUTION INTRAVENOUS ONCE
Status: COMPLETED | OUTPATIENT
Start: 2024-10-14 | End: 2024-10-14

## 2024-10-14 RX ORDER — HYDROMORPHONE HYDROCHLORIDE 1 MG/ML
.5-1 INJECTION, SOLUTION INTRAMUSCULAR; INTRAVENOUS; SUBCUTANEOUS
Status: DISCONTINUED | OUTPATIENT
Start: 2024-10-14 | End: 2024-10-14

## 2024-10-14 RX ORDER — POTASSIUM CHLORIDE 7.45 MG/ML
10 INJECTION INTRAVENOUS
Status: COMPLETED | OUTPATIENT
Start: 2024-10-14 | End: 2024-10-14

## 2024-10-14 RX ORDER — ONDANSETRON 2 MG/ML
4 INJECTION INTRAMUSCULAR; INTRAVENOUS EVERY 6 HOURS PRN
Status: DISCONTINUED | OUTPATIENT
Start: 2024-10-14 | End: 2024-10-15 | Stop reason: HOSPADM

## 2024-10-14 RX ORDER — DEXTROSE MONOHYDRATE 25 G/50ML
25 INJECTION, SOLUTION INTRAVENOUS
Status: DISCONTINUED | OUTPATIENT
Start: 2024-10-14 | End: 2024-10-15 | Stop reason: HOSPADM

## 2024-10-14 RX ORDER — POTASSIUM CHLORIDE 1500 MG/1
40 TABLET, EXTENDED RELEASE ORAL ONCE
Status: COMPLETED | OUTPATIENT
Start: 2024-10-14 | End: 2024-10-14

## 2024-10-14 RX ORDER — DEXTROSE AND SODIUM CHLORIDE 10; .45 G/100ML; G/100ML
250 INJECTION, SOLUTION INTRAVENOUS ONCE
Status: COMPLETED | OUTPATIENT
Start: 2024-10-14 | End: 2024-10-14

## 2024-10-14 RX ORDER — MAGNESIUM SULFATE HEPTAHYDRATE 40 MG/ML
4 INJECTION, SOLUTION INTRAVENOUS ONCE
Status: COMPLETED | OUTPATIENT
Start: 2024-10-14 | End: 2024-10-14

## 2024-10-14 RX ORDER — NOREPINEPHRINE BITARTRATE 0.03 MG/ML
0-1 INJECTION, SOLUTION INTRAVENOUS CONTINUOUS
Status: DISCONTINUED | OUTPATIENT
Start: 2024-10-14 | End: 2024-10-14

## 2024-10-14 RX ORDER — INSULIN LISPRO 100 [IU]/ML
3-14 INJECTION, SOLUTION INTRAVENOUS; SUBCUTANEOUS
Status: DISCONTINUED | OUTPATIENT
Start: 2024-10-14 | End: 2024-10-15 | Stop reason: HOSPADM

## 2024-10-14 RX ORDER — HYDROPHOR 42 G/100G
OINTMENT TOPICAL PRN
Status: DISCONTINUED | OUTPATIENT
Start: 2024-10-14 | End: 2024-10-15 | Stop reason: HOSPADM

## 2024-10-14 RX ADMIN — INSULIN LISPRO 4 UNITS: 100 INJECTION, SOLUTION INTRAVENOUS; SUBCUTANEOUS at 16:48

## 2024-10-14 RX ADMIN — PIPERACILLIN AND TAZOBACTAM 3.38 G: 3; .375 INJECTION, POWDER, FOR SOLUTION INTRAVENOUS at 16:42

## 2024-10-14 RX ADMIN — SODIUM CHLORIDE, SODIUM LACTATE, POTASSIUM CHLORIDE, CALCIUM CHLORIDE AND DEXTROSE MONOHYDRATE: 5; 600; 310; 30; 20 INJECTION, SOLUTION INTRAVENOUS at 00:17

## 2024-10-14 RX ADMIN — OXYCODONE 5 MG: 5 TABLET ORAL at 10:46

## 2024-10-14 RX ADMIN — POTASSIUM CHLORIDE 10 MEQ: 7.46 INJECTION, SOLUTION INTRAVENOUS at 01:29

## 2024-10-14 RX ADMIN — SENNOSIDES AND DOCUSATE SODIUM 2 TABLET: 50; 8.6 TABLET ORAL at 16:41

## 2024-10-14 RX ADMIN — DEXTROSE AND SODIUM CHLORIDE: 10; .45 INJECTION, SOLUTION INTRAVENOUS at 02:11

## 2024-10-14 RX ADMIN — POTASSIUM CHLORIDE 10 MEQ: 7.46 INJECTION, SOLUTION INTRAVENOUS at 04:59

## 2024-10-14 RX ADMIN — LIDOCAINE HYDROCHLORIDE 30 ML: 20 SOLUTION ORAL; TOPICAL at 16:41

## 2024-10-14 RX ADMIN — POTASSIUM CHLORIDE 40 MEQ: 1500 TABLET, EXTENDED RELEASE ORAL at 10:46

## 2024-10-14 RX ADMIN — PIPERACILLIN AND TAZOBACTAM 3.38 G: 3; .375 INJECTION, POWDER, FOR SOLUTION INTRAVENOUS at 01:59

## 2024-10-14 RX ADMIN — PIPERACILLIN AND TAZOBACTAM 3.38 G: 3; .375 INJECTION, POWDER, FOR SOLUTION INTRAVENOUS at 00:25

## 2024-10-14 RX ADMIN — DIBASIC SODIUM PHOSPHATE, MONOBASIC POTASSIUM PHOSPHATE AND MONOBASIC SODIUM PHOSPHATE 250 MG: 852; 155; 130 TABLET ORAL at 12:00

## 2024-10-14 RX ADMIN — INSULIN LISPRO 3 UNITS: 100 INJECTION, SOLUTION INTRAVENOUS; SUBCUTANEOUS at 20:40

## 2024-10-14 RX ADMIN — HEPARIN SODIUM 5000 UNITS: 5000 INJECTION, SOLUTION INTRAVENOUS; SUBCUTANEOUS at 05:08

## 2024-10-14 RX ADMIN — ACETAMINOPHEN 1000 MG: 500 TABLET ORAL at 05:07

## 2024-10-14 RX ADMIN — LIDOCAINE 3 PATCH: 4 PATCH TOPICAL at 16:43

## 2024-10-14 RX ADMIN — LIDOCAINE HYDROCHLORIDE 30 ML: 20 SOLUTION ORAL; TOPICAL at 08:36

## 2024-10-14 RX ADMIN — SODIUM CHLORIDE 15 UNITS/HR: 9 INJECTION, SOLUTION INTRAVENOUS at 02:00

## 2024-10-14 RX ADMIN — CALCIUM CHLORIDE 1000 MG: 100 INJECTION, SOLUTION INTRAVENOUS at 11:57

## 2024-10-14 RX ADMIN — ONDANSETRON 4 MG: 2 INJECTION INTRAMUSCULAR; INTRAVENOUS at 20:38

## 2024-10-14 RX ADMIN — OXYCODONE 5 MG: 5 TABLET ORAL at 05:08

## 2024-10-14 RX ADMIN — MAGNESIUM SULFATE HEPTAHYDRATE 4 G: 4 INJECTION, SOLUTION INTRAVENOUS at 10:45

## 2024-10-14 RX ADMIN — INSULIN LISPRO 4 UNITS: 100 INJECTION, SOLUTION INTRAVENOUS; SUBCUTANEOUS at 12:01

## 2024-10-14 RX ADMIN — POTASSIUM CHLORIDE 10 MEQ: 7.46 INJECTION, SOLUTION INTRAVENOUS at 06:16

## 2024-10-14 RX ADMIN — OXYCODONE 5 MG: 5 TABLET ORAL at 16:41

## 2024-10-14 RX ADMIN — ACETAMINOPHEN 1000 MG: 500 TABLET ORAL at 12:00

## 2024-10-14 RX ADMIN — SODIUM CHLORIDE, POTASSIUM CHLORIDE, SODIUM LACTATE AND CALCIUM CHLORIDE 500 ML: 600; 310; 30; 20 INJECTION, SOLUTION INTRAVENOUS at 01:21

## 2024-10-14 RX ADMIN — INSULIN GLARGINE-YFGN 15 UNITS: 100 INJECTION, SOLUTION SUBCUTANEOUS at 08:33

## 2024-10-14 RX ADMIN — DIBASIC SODIUM PHOSPHATE, MONOBASIC POTASSIUM PHOSPHATE AND MONOBASIC SODIUM PHOSPHATE 250 MG: 852; 155; 130 TABLET ORAL at 16:41

## 2024-10-14 RX ADMIN — PIPERACILLIN AND TAZOBACTAM 3.38 G: 3; .375 INJECTION, POWDER, FOR SOLUTION INTRAVENOUS at 08:40

## 2024-10-14 RX ADMIN — POTASSIUM CHLORIDE 10 MEQ: 7.46 INJECTION, SOLUTION INTRAVENOUS at 00:21

## 2024-10-14 RX ADMIN — DEXTROSE AND SODIUM CHLORIDE 250 ML: 10; .45 INJECTION, SOLUTION INTRAVENOUS at 03:27

## 2024-10-14 ASSESSMENT — ENCOUNTER SYMPTOMS
HEADACHES: 0
DEPRESSION: 0
PHOTOPHOBIA: 0
CONSTIPATION: 0
VOMITING: 0
DIZZINESS: 0
BLURRED VISION: 0
ABDOMINAL PAIN: 1
TREMORS: 0
SHORTNESS OF BREATH: 0
CHILLS: 0
FEVER: 0
ABDOMINAL PAIN: 0
SPEECH CHANGE: 0
NAUSEA: 1
ORTHOPNEA: 0
NECK PAIN: 0
HALLUCINATIONS: 0
EYE REDNESS: 0
EYE PAIN: 0
PALPITATIONS: 0
BACK PAIN: 0
WEIGHT LOSS: 0
NAUSEA: 0
DOUBLE VISION: 0
MYALGIAS: 0
COUGH: 0
DIARRHEA: 0
BRUISES/BLEEDS EASILY: 0
SENSORY CHANGE: 0
TINGLING: 0
SORE THROAT: 0
BLOOD IN STOOL: 0
NERVOUS/ANXIOUS: 0
FOCAL WEAKNESS: 0
EYE DISCHARGE: 0
FLANK PAIN: 1
SPUTUM PRODUCTION: 0

## 2024-10-14 ASSESSMENT — LIFESTYLE VARIABLES: SUBSTANCE_ABUSE: 0

## 2024-10-14 ASSESSMENT — PAIN DESCRIPTION - PAIN TYPE
TYPE: ACUTE PAIN

## 2024-10-15 ENCOUNTER — PHARMACY VISIT (OUTPATIENT)
Dept: PHARMACY | Facility: MEDICAL CENTER | Age: 59
End: 2024-10-15
Payer: COMMERCIAL

## 2024-10-15 VITALS
OXYGEN SATURATION: 95 % | SYSTOLIC BLOOD PRESSURE: 137 MMHG | RESPIRATION RATE: 17 BRPM | BODY MASS INDEX: 22.9 KG/M2 | DIASTOLIC BLOOD PRESSURE: 83 MMHG | TEMPERATURE: 99 F | HEIGHT: 72 IN | WEIGHT: 169.09 LBS | HEART RATE: 99 BPM

## 2024-10-15 LAB
ALBUMIN SERPL BCP-MCNC: 3 G/DL (ref 3.2–4.9)
ALBUMIN/GLOB SERPL: 1.1 G/DL
ALP SERPL-CCNC: 86 U/L (ref 30–99)
ALT SERPL-CCNC: 13 U/L (ref 2–50)
ANION GAP SERPL CALC-SCNC: 12 MMOL/L (ref 7–16)
AST SERPL-CCNC: 15 U/L (ref 12–45)
BACTERIA UR CULT: ABNORMAL
BACTERIA UR CULT: ABNORMAL
BILIRUB SERPL-MCNC: 0.4 MG/DL (ref 0.1–1.5)
BUN SERPL-MCNC: 21 MG/DL (ref 8–22)
CALCIUM ALBUM COR SERPL-MCNC: 9.3 MG/DL (ref 8.5–10.5)
CALCIUM SERPL-MCNC: 8.5 MG/DL (ref 8.5–10.5)
CHLORIDE SERPL-SCNC: 100 MMOL/L (ref 96–112)
CO2 SERPL-SCNC: 26 MMOL/L (ref 20–33)
CREAT SERPL-MCNC: 0.63 MG/DL (ref 0.5–1.4)
ERYTHROCYTE [DISTWIDTH] IN BLOOD BY AUTOMATED COUNT: 42 FL (ref 35.9–50)
EST. AVERAGE GLUCOSE BLD GHB EST-MCNC: 326 MG/DL
GFR SERPLBLD CREATININE-BSD FMLA CKD-EPI: 109 ML/MIN/1.73 M 2
GLOBULIN SER CALC-MCNC: 2.7 G/DL (ref 1.9–3.5)
GLUCOSE BLD STRIP.AUTO-MCNC: 199 MG/DL (ref 65–99)
GLUCOSE BLD STRIP.AUTO-MCNC: 217 MG/DL (ref 65–99)
GLUCOSE SERPL-MCNC: 205 MG/DL (ref 65–99)
HBA1C MFR BLD: 13 % (ref 4–5.6)
HCT VFR BLD AUTO: 37.3 % (ref 42–52)
HGB BLD-MCNC: 12.1 G/DL (ref 14–18)
MAGNESIUM SERPL-MCNC: 2.3 MG/DL (ref 1.5–2.5)
MCH RBC QN AUTO: 26.8 PG (ref 27–33)
MCHC RBC AUTO-ENTMCNC: 32.4 G/DL (ref 32.3–36.5)
MCV RBC AUTO: 82.5 FL (ref 81.4–97.8)
PHOSPHATE SERPL-MCNC: 2.3 MG/DL (ref 2.5–4.5)
PLATELET # BLD AUTO: 174 K/UL (ref 164–446)
PMV BLD AUTO: 9.2 FL (ref 9–12.9)
POTASSIUM SERPL-SCNC: 4.3 MMOL/L (ref 3.6–5.5)
PROT SERPL-MCNC: 5.7 G/DL (ref 6–8.2)
RBC # BLD AUTO: 4.52 M/UL (ref 4.7–6.1)
SIGNIFICANT IND 70042: ABNORMAL
SITE SITE: ABNORMAL
SODIUM SERPL-SCNC: 138 MMOL/L (ref 135–145)
SOURCE SOURCE: ABNORMAL
WBC # BLD AUTO: 9 K/UL (ref 4.8–10.8)

## 2024-10-15 PROCEDURE — A9270 NON-COVERED ITEM OR SERVICE: HCPCS | Performed by: GENERAL PRACTICE

## 2024-10-15 PROCEDURE — 90656 IIV3 VACC NO PRSV 0.5 ML IM: CPT | Performed by: GENERAL PRACTICE

## 2024-10-15 PROCEDURE — 85027 COMPLETE CBC AUTOMATED: CPT

## 2024-10-15 PROCEDURE — 99239 HOSP IP/OBS DSCHRG MGMT >30: CPT | Performed by: GENERAL PRACTICE

## 2024-10-15 PROCEDURE — 700102 HCHG RX REV CODE 250 W/ 637 OVERRIDE(OP): Performed by: STUDENT IN AN ORGANIZED HEALTH CARE EDUCATION/TRAINING PROGRAM

## 2024-10-15 PROCEDURE — 80053 COMPREHEN METABOLIC PANEL: CPT

## 2024-10-15 PROCEDURE — 700111 HCHG RX REV CODE 636 W/ 250 OVERRIDE (IP): Mod: JZ | Performed by: NURSE PRACTITIONER

## 2024-10-15 PROCEDURE — A9270 NON-COVERED ITEM OR SERVICE: HCPCS | Performed by: INTERNAL MEDICINE

## 2024-10-15 PROCEDURE — A9270 NON-COVERED ITEM OR SERVICE: HCPCS | Performed by: STUDENT IN AN ORGANIZED HEALTH CARE EDUCATION/TRAINING PROGRAM

## 2024-10-15 PROCEDURE — 84100 ASSAY OF PHOSPHORUS: CPT

## 2024-10-15 PROCEDURE — 700102 HCHG RX REV CODE 250 W/ 637 OVERRIDE(OP): Performed by: INTERNAL MEDICINE

## 2024-10-15 PROCEDURE — 700102 HCHG RX REV CODE 250 W/ 637 OVERRIDE(OP): Performed by: GENERAL PRACTICE

## 2024-10-15 PROCEDURE — 700105 HCHG RX REV CODE 258: Performed by: STUDENT IN AN ORGANIZED HEALTH CARE EDUCATION/TRAINING PROGRAM

## 2024-10-15 PROCEDURE — 83735 ASSAY OF MAGNESIUM: CPT

## 2024-10-15 PROCEDURE — 700111 HCHG RX REV CODE 636 W/ 250 OVERRIDE (IP): Performed by: GENERAL PRACTICE

## 2024-10-15 PROCEDURE — 83036 HEMOGLOBIN GLYCOSYLATED A1C: CPT

## 2024-10-15 PROCEDURE — 82962 GLUCOSE BLOOD TEST: CPT

## 2024-10-15 PROCEDURE — RXMED WILLOW AMBULATORY MEDICATION CHARGE: Performed by: GENERAL PRACTICE

## 2024-10-15 PROCEDURE — 700111 HCHG RX REV CODE 636 W/ 250 OVERRIDE (IP): Mod: JZ | Performed by: STUDENT IN AN ORGANIZED HEALTH CARE EDUCATION/TRAINING PROGRAM

## 2024-10-15 PROCEDURE — 90471 IMMUNIZATION ADMIN: CPT

## 2024-10-15 RX ORDER — LANCETS 30 GAUGE
EACH MISCELLANEOUS
Qty: 100 EACH | Refills: 0 | Status: SHIPPED | OUTPATIENT
Start: 2024-10-15

## 2024-10-15 RX ORDER — INSULIN ASPART 100 [IU]/ML
3-14 INJECTION, SOLUTION INTRAVENOUS; SUBCUTANEOUS
Qty: 12 ML | Refills: 0 | Status: ACTIVE | OUTPATIENT
Start: 2024-10-15 | End: 2024-12-14

## 2024-10-15 RX ORDER — GLUCOSAMINE HCL/CHONDROITIN SU 500-400 MG
CAPSULE ORAL
Qty: 100 EACH | Refills: 0 | Status: SHIPPED | OUTPATIENT
Start: 2024-10-15

## 2024-10-15 RX ORDER — INSULIN GLARGINE 100 [IU]/ML
15 INJECTION, SOLUTION SUBCUTANEOUS EVERY EVENING
Qty: 9 ML | Refills: 0 | Status: ON HOLD | OUTPATIENT
Start: 2024-10-15 | End: 2024-10-24

## 2024-10-15 RX ORDER — DIPHENHYDRAMINE HYDROCHLORIDE 25 MG/1
CAPSULE, LIQUID FILLED ORAL
Qty: 1 KIT | Refills: 0 | Status: SHIPPED | OUTPATIENT
Start: 2024-10-15

## 2024-10-15 RX ADMIN — INFLUENZA A VIRUS A/VICTORIA/4897/2022 IVR-238 (H1N1) ANTIGEN (FORMALDEHYDE INACTIVATED), INFLUENZA A VIRUS A/CALIFORNIA/122/2022 SAN-022 (H3N2) ANTIGEN (FORMALDEHYDE INACTIVATED), AND INFLUENZA B VIRUS B/MICHIGAN/01/2021 ANTIGEN (FORMALDEHYDE INACTIVATED) 0.5 ML: 15; 15; 15 INJECTION, SUSPENSION INTRAMUSCULAR at 14:11

## 2024-10-15 RX ADMIN — OXYCODONE 5 MG: 5 TABLET ORAL at 08:04

## 2024-10-15 RX ADMIN — DIBASIC SODIUM PHOSPHATE, MONOBASIC POTASSIUM PHOSPHATE AND MONOBASIC SODIUM PHOSPHATE 500 MG: 852; 155; 130 TABLET ORAL at 08:03

## 2024-10-15 RX ADMIN — PIPERACILLIN AND TAZOBACTAM 3.38 G: 3; .375 INJECTION, POWDER, FOR SOLUTION INTRAVENOUS at 00:35

## 2024-10-15 RX ADMIN — OXYCODONE 5 MG: 5 TABLET ORAL at 03:56

## 2024-10-15 RX ADMIN — INSULIN LISPRO 4 UNITS: 100 INJECTION, SOLUTION INTRAVENOUS; SUBCUTANEOUS at 08:33

## 2024-10-15 RX ADMIN — INSULIN LISPRO 3 UNITS: 100 INJECTION, SOLUTION INTRAVENOUS; SUBCUTANEOUS at 12:48

## 2024-10-15 RX ADMIN — ACETAMINOPHEN 1000 MG: 500 TABLET ORAL at 12:52

## 2024-10-15 RX ADMIN — DIBASIC SODIUM PHOSPHATE, MONOBASIC POTASSIUM PHOSPHATE AND MONOBASIC SODIUM PHOSPHATE 250 MG: 852; 155; 130 TABLET ORAL at 00:34

## 2024-10-15 RX ADMIN — DIBASIC SODIUM PHOSPHATE, MONOBASIC POTASSIUM PHOSPHATE AND MONOBASIC SODIUM PHOSPHATE 250 MG: 852; 155; 130 TABLET ORAL at 05:28

## 2024-10-15 RX ADMIN — LIDOCAINE HYDROCHLORIDE 30 ML: 20 SOLUTION ORAL; TOPICAL at 03:56

## 2024-10-15 RX ADMIN — ACETAMINOPHEN 1000 MG: 500 TABLET ORAL at 05:31

## 2024-10-15 RX ADMIN — HEPARIN SODIUM 5000 UNITS: 5000 INJECTION, SOLUTION INTRAVENOUS; SUBCUTANEOUS at 05:32

## 2024-10-15 RX ADMIN — ONDANSETRON 4 MG: 2 INJECTION INTRAMUSCULAR; INTRAVENOUS at 08:06

## 2024-10-15 RX ADMIN — PIPERACILLIN AND TAZOBACTAM 3.38 G: 3; .375 INJECTION, POWDER, FOR SOLUTION INTRAVENOUS at 08:37

## 2024-10-15 RX ADMIN — OXYCODONE 5 MG: 5 TABLET ORAL at 13:09

## 2024-10-15 ASSESSMENT — PAIN DESCRIPTION - PAIN TYPE
TYPE: ACUTE PAIN

## 2024-10-18 ENCOUNTER — HOSPITAL ENCOUNTER (OUTPATIENT)
Facility: MEDICAL CENTER | Age: 59
End: 2024-10-24
Attending: EMERGENCY MEDICINE | Admitting: HOSPITALIST
Payer: COMMERCIAL

## 2024-10-18 ENCOUNTER — APPOINTMENT (OUTPATIENT)
Dept: RADIOLOGY | Facility: MEDICAL CENTER | Age: 59
End: 2024-10-18
Attending: EMERGENCY MEDICINE
Payer: COMMERCIAL

## 2024-10-18 DIAGNOSIS — R07.9 CHEST PAIN, UNSPECIFIED TYPE: ICD-10-CM

## 2024-10-18 DIAGNOSIS — S46.911A STRAIN OF RIGHT SHOULDER, INITIAL ENCOUNTER: ICD-10-CM

## 2024-10-18 DIAGNOSIS — E11.10 DKA, TYPE 2, NOT AT GOAL (HCC): ICD-10-CM

## 2024-10-18 DIAGNOSIS — E11.65 UNCONTROLLED TYPE 2 DIABETES MELLITUS WITH HYPERGLYCEMIA (HCC): ICD-10-CM

## 2024-10-18 DIAGNOSIS — M48.02 CERVICAL STENOSIS OF SPINAL CANAL: ICD-10-CM

## 2024-10-18 DIAGNOSIS — S50.01XA CONTUSION OF RIGHT ELBOW, INITIAL ENCOUNTER: ICD-10-CM

## 2024-10-18 DIAGNOSIS — M25.551 PAIN OF RIGHT HIP: ICD-10-CM

## 2024-10-18 DIAGNOSIS — M48.061 SPINAL STENOSIS OF LUMBAR REGION, UNSPECIFIED WHETHER NEUROGENIC CLAUDICATION PRESENT: ICD-10-CM

## 2024-10-18 DIAGNOSIS — R73.9 HYPERGLYCEMIA: ICD-10-CM

## 2024-10-18 PROBLEM — M54.2 NECK PAIN: Status: ACTIVE | Noted: 2024-10-18

## 2024-10-18 LAB
ALBUMIN SERPL BCP-MCNC: 3.2 G/DL (ref 3.2–4.9)
ALBUMIN/GLOB SERPL: 1.2 G/DL
ALP SERPL-CCNC: 81 U/L (ref 30–99)
ALT SERPL-CCNC: 8 U/L (ref 2–50)
ANION GAP SERPL CALC-SCNC: 12 MMOL/L (ref 7–16)
AST SERPL-CCNC: 11 U/L (ref 12–45)
BASOPHILS # BLD AUTO: 0 % (ref 0–1.8)
BASOPHILS # BLD: 0 K/UL (ref 0–0.12)
BILIRUB SERPL-MCNC: 0.3 MG/DL (ref 0.1–1.5)
BUN SERPL-MCNC: 15 MG/DL (ref 8–22)
CALCIUM ALBUM COR SERPL-MCNC: 9 MG/DL (ref 8.5–10.5)
CALCIUM SERPL-MCNC: 8.4 MG/DL (ref 8.5–10.5)
CHLORIDE SERPL-SCNC: 93 MMOL/L (ref 96–112)
CO2 SERPL-SCNC: 27 MMOL/L (ref 20–33)
CREAT SERPL-MCNC: 0.73 MG/DL (ref 0.5–1.4)
EOSINOPHIL # BLD AUTO: 0.07 K/UL (ref 0–0.51)
EOSINOPHIL NFR BLD: 1.4 % (ref 0–6.9)
ERYTHROCYTE [DISTWIDTH] IN BLOOD BY AUTOMATED COUNT: 37.7 FL (ref 35.9–50)
GFR SERPLBLD CREATININE-BSD FMLA CKD-EPI: 104 ML/MIN/1.73 M 2
GLOBULIN SER CALC-MCNC: 2.6 G/DL (ref 1.9–3.5)
GLUCOSE BLD STRIP.AUTO-MCNC: 289 MG/DL (ref 65–99)
GLUCOSE BLD STRIP.AUTO-MCNC: 295 MG/DL (ref 65–99)
GLUCOSE SERPL-MCNC: 411 MG/DL (ref 65–99)
HCT VFR BLD AUTO: 35.2 % (ref 42–52)
HGB BLD-MCNC: 11.8 G/DL (ref 14–18)
IMM GRANULOCYTES # BLD AUTO: 0.02 K/UL (ref 0–0.11)
IMM GRANULOCYTES NFR BLD AUTO: 0.4 % (ref 0–0.9)
LYMPHOCYTES # BLD AUTO: 0.8 K/UL (ref 1–4.8)
LYMPHOCYTES NFR BLD: 16.1 % (ref 22–41)
MCH RBC QN AUTO: 26.9 PG (ref 27–33)
MCHC RBC AUTO-ENTMCNC: 33.5 G/DL (ref 32.3–36.5)
MCV RBC AUTO: 80.2 FL (ref 81.4–97.8)
MONOCYTES # BLD AUTO: 0.42 K/UL (ref 0–0.85)
MONOCYTES NFR BLD AUTO: 8.5 % (ref 0–13.4)
NEUTROPHILS # BLD AUTO: 3.65 K/UL (ref 1.82–7.42)
NEUTROPHILS NFR BLD: 73.6 % (ref 44–72)
NRBC # BLD AUTO: 0 K/UL
NRBC BLD-RTO: 0 /100 WBC (ref 0–0.2)
PLATELET # BLD AUTO: 193 K/UL (ref 164–446)
PMV BLD AUTO: 8.8 FL (ref 9–12.9)
POTASSIUM SERPL-SCNC: 4 MMOL/L (ref 3.6–5.5)
PROT SERPL-MCNC: 5.8 G/DL (ref 6–8.2)
RBC # BLD AUTO: 4.39 M/UL (ref 4.7–6.1)
SODIUM SERPL-SCNC: 132 MMOL/L (ref 135–145)
WBC # BLD AUTO: 5 K/UL (ref 4.8–10.8)

## 2024-10-18 PROCEDURE — 85025 COMPLETE CBC W/AUTO DIFF WBC: CPT

## 2024-10-18 PROCEDURE — 71045 X-RAY EXAM CHEST 1 VIEW: CPT

## 2024-10-18 PROCEDURE — 700102 HCHG RX REV CODE 250 W/ 637 OVERRIDE(OP): Performed by: HOSPITALIST

## 2024-10-18 PROCEDURE — 36415 COLL VENOUS BLD VENIPUNCTURE: CPT

## 2024-10-18 PROCEDURE — 80053 COMPREHEN METABOLIC PANEL: CPT

## 2024-10-18 PROCEDURE — 99204 OFFICE O/P NEW MOD 45 MIN: CPT | Performed by: HOSPITALIST

## 2024-10-18 PROCEDURE — A9270 NON-COVERED ITEM OR SERVICE: HCPCS | Performed by: EMERGENCY MEDICINE

## 2024-10-18 PROCEDURE — 73060 X-RAY EXAM OF HUMERUS: CPT | Mod: RT

## 2024-10-18 PROCEDURE — 82962 GLUCOSE BLOOD TEST: CPT

## 2024-10-18 PROCEDURE — 700102 HCHG RX REV CODE 250 W/ 637 OVERRIDE(OP): Performed by: EMERGENCY MEDICINE

## 2024-10-18 PROCEDURE — 99285 EMERGENCY DEPT VISIT HI MDM: CPT

## 2024-10-18 PROCEDURE — A9270 NON-COVERED ITEM OR SERVICE: HCPCS | Performed by: HOSPITALIST

## 2024-10-18 RX ORDER — IBUPROFEN 800 MG/1
400 TABLET, FILM COATED ORAL EVERY 6 HOURS PRN
Status: DISCONTINUED | OUTPATIENT
Start: 2024-10-18 | End: 2024-10-19

## 2024-10-18 RX ORDER — ONDANSETRON 2 MG/ML
4 INJECTION INTRAMUSCULAR; INTRAVENOUS EVERY 4 HOURS PRN
Status: DISCONTINUED | OUTPATIENT
Start: 2024-10-18 | End: 2024-10-24 | Stop reason: HOSPADM

## 2024-10-18 RX ORDER — CYCLOBENZAPRINE HCL 10 MG
5 TABLET ORAL 3 TIMES DAILY PRN
Status: DISCONTINUED | OUTPATIENT
Start: 2024-10-18 | End: 2024-10-24 | Stop reason: HOSPADM

## 2024-10-18 RX ORDER — PROMETHAZINE HYDROCHLORIDE 25 MG/1
12.5-25 SUPPOSITORY RECTAL EVERY 4 HOURS PRN
Status: DISCONTINUED | OUTPATIENT
Start: 2024-10-18 | End: 2024-10-24 | Stop reason: HOSPADM

## 2024-10-18 RX ORDER — PROMETHAZINE HYDROCHLORIDE 25 MG/1
12.5-25 TABLET ORAL EVERY 4 HOURS PRN
Status: DISCONTINUED | OUTPATIENT
Start: 2024-10-18 | End: 2024-10-24 | Stop reason: HOSPADM

## 2024-10-18 RX ORDER — PROCHLORPERAZINE EDISYLATE 5 MG/ML
5-10 INJECTION INTRAMUSCULAR; INTRAVENOUS EVERY 4 HOURS PRN
Status: DISCONTINUED | OUTPATIENT
Start: 2024-10-18 | End: 2024-10-24 | Stop reason: HOSPADM

## 2024-10-18 RX ORDER — ACETAMINOPHEN 325 MG/1
650 TABLET ORAL EVERY 6 HOURS PRN
Status: DISCONTINUED | OUTPATIENT
Start: 2024-10-18 | End: 2024-10-24 | Stop reason: HOSPADM

## 2024-10-18 RX ORDER — OXYCODONE HYDROCHLORIDE 5 MG/1
5 TABLET ORAL EVERY 4 HOURS PRN
Status: DISCONTINUED | OUTPATIENT
Start: 2024-10-18 | End: 2024-10-19

## 2024-10-18 RX ORDER — GABAPENTIN 100 MG/1
100 CAPSULE ORAL 3 TIMES DAILY
Status: DISCONTINUED | OUTPATIENT
Start: 2024-10-19 | End: 2024-10-20

## 2024-10-18 RX ORDER — ASPIRIN 81 MG/1
81 TABLET ORAL DAILY
Status: DISCONTINUED | OUTPATIENT
Start: 2024-10-19 | End: 2024-10-23

## 2024-10-18 RX ORDER — DEXTROSE MONOHYDRATE 25 G/50ML
25 INJECTION, SOLUTION INTRAVENOUS
Status: DISCONTINUED | OUTPATIENT
Start: 2024-10-18 | End: 2024-10-22

## 2024-10-18 RX ORDER — INSULIN LISPRO 100 [IU]/ML
2-9 INJECTION, SOLUTION INTRAVENOUS; SUBCUTANEOUS
Status: DISCONTINUED | OUTPATIENT
Start: 2024-10-19 | End: 2024-10-22

## 2024-10-18 RX ORDER — OXYCODONE HYDROCHLORIDE 5 MG/1
5 TABLET ORAL ONCE
Status: COMPLETED | OUTPATIENT
Start: 2024-10-18 | End: 2024-10-18

## 2024-10-18 RX ORDER — ONDANSETRON 4 MG/1
4 TABLET, ORALLY DISINTEGRATING ORAL EVERY 4 HOURS PRN
Status: DISCONTINUED | OUTPATIENT
Start: 2024-10-18 | End: 2024-10-24 | Stop reason: HOSPADM

## 2024-10-18 RX ORDER — LABETALOL HYDROCHLORIDE 5 MG/ML
10 INJECTION, SOLUTION INTRAVENOUS EVERY 4 HOURS PRN
Status: DISCONTINUED | OUTPATIENT
Start: 2024-10-18 | End: 2024-10-24 | Stop reason: HOSPADM

## 2024-10-18 RX ADMIN — OXYCODONE 5 MG: 5 TABLET ORAL at 16:48

## 2024-10-18 RX ADMIN — OXYCODONE 5 MG: 5 TABLET ORAL at 23:27

## 2024-10-18 ASSESSMENT — ENCOUNTER SYMPTOMS
DEPRESSION: 0
CHILLS: 0
DIZZINESS: 0
TREMORS: 0
HEARTBURN: 0
NAUSEA: 0
CLAUDICATION: 0
DIAPHORESIS: 0
SHORTNESS OF BREATH: 0
SINUS PAIN: 0
FEVER: 0
ABDOMINAL PAIN: 0
MYALGIAS: 0
PALPITATIONS: 0
PND: 0
COUGH: 0
HEMOPTYSIS: 0
BLOOD IN STOOL: 0
ORTHOPNEA: 0
DIARRHEA: 0
TINGLING: 0
SORE THROAT: 0
FLANK PAIN: 0
STRIDOR: 0
FALLS: 1
BACK PAIN: 0
WEAKNESS: 0
EYE PAIN: 0
HEADACHES: 0
PHOTOPHOBIA: 0
DOUBLE VISION: 0
BRUISES/BLEEDS EASILY: 0
VOMITING: 0
BLURRED VISION: 0
SPUTUM PRODUCTION: 0
POLYDIPSIA: 0
WHEEZING: 0
HALLUCINATIONS: 0
NECK PAIN: 1
CONSTIPATION: 0

## 2024-10-18 ASSESSMENT — PAIN SCALES - WONG BAKER: WONGBAKER_NUMERICALRESPONSE: HURTS EVEN MORE

## 2024-10-18 ASSESSMENT — PAIN DESCRIPTION - PAIN TYPE: TYPE: ACUTE PAIN

## 2024-10-18 ASSESSMENT — LIFESTYLE VARIABLES: SUBSTANCE_ABUSE: 0

## 2024-10-18 ASSESSMENT — FIBROSIS 4 INDEX: FIB4 SCORE: 1.43

## 2024-10-19 ENCOUNTER — APPOINTMENT (OUTPATIENT)
Dept: RADIOLOGY | Facility: MEDICAL CENTER | Age: 59
End: 2024-10-19
Attending: EMERGENCY MEDICINE
Payer: COMMERCIAL

## 2024-10-19 PROBLEM — E11.65 HYPERGLYCEMIA DUE TO TYPE 2 DIABETES MELLITUS (HCC): Status: ACTIVE | Noted: 2024-10-19

## 2024-10-19 LAB
ALBUMIN SERPL BCP-MCNC: 3.1 G/DL (ref 3.2–4.9)
ALBUMIN/GLOB SERPL: 1.2 G/DL
ALP SERPL-CCNC: 77 U/L (ref 30–99)
ALT SERPL-CCNC: 6 U/L (ref 2–50)
ANION GAP SERPL CALC-SCNC: 9 MMOL/L (ref 7–16)
AST SERPL-CCNC: 9 U/L (ref 12–45)
BACTERIA BLD CULT: NORMAL
BACTERIA BLD CULT: NORMAL
BASOPHILS # BLD AUTO: 0.2 % (ref 0–1.8)
BASOPHILS # BLD: 0.01 K/UL (ref 0–0.12)
BILIRUB SERPL-MCNC: 0.3 MG/DL (ref 0.1–1.5)
BUN SERPL-MCNC: 15 MG/DL (ref 8–22)
CALCIUM ALBUM COR SERPL-MCNC: 9.2 MG/DL (ref 8.5–10.5)
CALCIUM SERPL-MCNC: 8.5 MG/DL (ref 8.5–10.5)
CHLORIDE SERPL-SCNC: 94 MMOL/L (ref 96–112)
CO2 SERPL-SCNC: 30 MMOL/L (ref 20–33)
CREAT SERPL-MCNC: 0.76 MG/DL (ref 0.5–1.4)
EOSINOPHIL # BLD AUTO: 0.08 K/UL (ref 0–0.51)
EOSINOPHIL NFR BLD: 1.3 % (ref 0–6.9)
ERYTHROCYTE [DISTWIDTH] IN BLOOD BY AUTOMATED COUNT: 38.3 FL (ref 35.9–50)
GFR SERPLBLD CREATININE-BSD FMLA CKD-EPI: 103 ML/MIN/1.73 M 2
GLOBULIN SER CALC-MCNC: 2.5 G/DL (ref 1.9–3.5)
GLUCOSE BLD STRIP.AUTO-MCNC: 247 MG/DL (ref 65–99)
GLUCOSE BLD STRIP.AUTO-MCNC: 270 MG/DL (ref 65–99)
GLUCOSE BLD STRIP.AUTO-MCNC: 296 MG/DL (ref 65–99)
GLUCOSE BLD STRIP.AUTO-MCNC: 346 MG/DL (ref 65–99)
GLUCOSE SERPL-MCNC: 383 MG/DL (ref 65–99)
HCT VFR BLD AUTO: 35.2 % (ref 42–52)
HGB BLD-MCNC: 11.6 G/DL (ref 14–18)
IMM GRANULOCYTES # BLD AUTO: 0.02 K/UL (ref 0–0.11)
IMM GRANULOCYTES NFR BLD AUTO: 0.3 % (ref 0–0.9)
LYMPHOCYTES # BLD AUTO: 0.97 K/UL (ref 1–4.8)
LYMPHOCYTES NFR BLD: 15.5 % (ref 22–41)
MCH RBC QN AUTO: 26.8 PG (ref 27–33)
MCHC RBC AUTO-ENTMCNC: 33 G/DL (ref 32.3–36.5)
MCV RBC AUTO: 81.3 FL (ref 81.4–97.8)
MONOCYTES # BLD AUTO: 0.57 K/UL (ref 0–0.85)
MONOCYTES NFR BLD AUTO: 9.1 % (ref 0–13.4)
NEUTROPHILS # BLD AUTO: 4.59 K/UL (ref 1.82–7.42)
NEUTROPHILS NFR BLD: 73.6 % (ref 44–72)
NRBC # BLD AUTO: 0 K/UL
NRBC BLD-RTO: 0 /100 WBC (ref 0–0.2)
PLATELET # BLD AUTO: 210 K/UL (ref 164–446)
PMV BLD AUTO: 8.9 FL (ref 9–12.9)
POTASSIUM SERPL-SCNC: 4.1 MMOL/L (ref 3.6–5.5)
PROT SERPL-MCNC: 5.6 G/DL (ref 6–8.2)
RBC # BLD AUTO: 4.33 M/UL (ref 4.7–6.1)
SIGNIFICANT IND 70042: NORMAL
SIGNIFICANT IND 70042: NORMAL
SITE SITE: NORMAL
SITE SITE: NORMAL
SODIUM SERPL-SCNC: 133 MMOL/L (ref 135–145)
SOURCE SOURCE: NORMAL
SOURCE SOURCE: NORMAL
WBC # BLD AUTO: 6.2 K/UL (ref 4.8–10.8)

## 2024-10-19 PROCEDURE — 82962 GLUCOSE BLOOD TEST: CPT

## 2024-10-19 PROCEDURE — 700102 HCHG RX REV CODE 250 W/ 637 OVERRIDE(OP): Performed by: EMERGENCY MEDICINE

## 2024-10-19 PROCEDURE — 97163 PT EVAL HIGH COMPLEX 45 MIN: CPT

## 2024-10-19 PROCEDURE — 85025 COMPLETE CBC W/AUTO DIFF WBC: CPT

## 2024-10-19 PROCEDURE — A9270 NON-COVERED ITEM OR SERVICE: HCPCS | Performed by: HOSPITALIST

## 2024-10-19 PROCEDURE — A9270 NON-COVERED ITEM OR SERVICE: HCPCS | Performed by: EMERGENCY MEDICINE

## 2024-10-19 PROCEDURE — 96372 THER/PROPH/DIAG INJ SC/IM: CPT

## 2024-10-19 PROCEDURE — A9270 NON-COVERED ITEM OR SERVICE: HCPCS

## 2024-10-19 PROCEDURE — 97166 OT EVAL MOD COMPLEX 45 MIN: CPT

## 2024-10-19 PROCEDURE — 700102 HCHG RX REV CODE 250 W/ 637 OVERRIDE(OP)

## 2024-10-19 PROCEDURE — 80053 COMPREHEN METABOLIC PANEL: CPT

## 2024-10-19 PROCEDURE — 700102 HCHG RX REV CODE 250 W/ 637 OVERRIDE(OP): Performed by: HOSPITALIST

## 2024-10-19 PROCEDURE — 71045 X-RAY EXAM CHEST 1 VIEW: CPT

## 2024-10-19 PROCEDURE — G0378 HOSPITAL OBSERVATION PER HR: HCPCS

## 2024-10-19 PROCEDURE — 36415 COLL VENOUS BLD VENIPUNCTURE: CPT

## 2024-10-19 PROCEDURE — 99223 1ST HOSP IP/OBS HIGH 75: CPT | Performed by: HOSPITALIST

## 2024-10-19 RX ORDER — OXYCODONE HYDROCHLORIDE 10 MG/1
10 TABLET ORAL EVERY 4 HOURS PRN
Status: DISCONTINUED | OUTPATIENT
Start: 2024-10-19 | End: 2024-10-20

## 2024-10-19 RX ORDER — OXYCODONE HYDROCHLORIDE 5 MG/1
5 TABLET ORAL EVERY 4 HOURS PRN
Status: DISCONTINUED | OUTPATIENT
Start: 2024-10-19 | End: 2024-10-20

## 2024-10-19 RX ADMIN — GABAPENTIN 100 MG: 100 CAPSULE ORAL at 07:11

## 2024-10-19 RX ADMIN — ASPIRIN 81 MG: 81 TABLET, COATED ORAL at 07:11

## 2024-10-19 RX ADMIN — GABAPENTIN 100 MG: 100 CAPSULE ORAL at 18:18

## 2024-10-19 RX ADMIN — AMOXICILLIN AND CLAVULANATE POTASSIUM 1 TABLET: 875; 125 TABLET, FILM COATED ORAL at 18:18

## 2024-10-19 RX ADMIN — INSULIN LISPRO 6 UNITS: 100 INJECTION, SOLUTION INTRAVENOUS; SUBCUTANEOUS at 07:49

## 2024-10-19 RX ADMIN — OXYCODONE 5 MG: 5 TABLET ORAL at 07:54

## 2024-10-19 RX ADMIN — OXYCODONE 5 MG: 5 TABLET ORAL at 21:41

## 2024-10-19 RX ADMIN — AMOXICILLIN AND CLAVULANATE POTASSIUM 1 TABLET: 875; 125 TABLET, FILM COATED ORAL at 09:41

## 2024-10-19 RX ADMIN — GABAPENTIN 100 MG: 100 CAPSULE ORAL at 13:04

## 2024-10-19 RX ADMIN — INSULIN LISPRO 3 UNITS: 100 INJECTION, SOLUTION INTRAVENOUS; SUBCUTANEOUS at 18:18

## 2024-10-19 RX ADMIN — OXYCODONE 5 MG: 5 TABLET ORAL at 19:34

## 2024-10-19 RX ADMIN — INSULIN LISPRO 5 UNITS: 100 INJECTION, SOLUTION INTRAVENOUS; SUBCUTANEOUS at 11:42

## 2024-10-19 RX ADMIN — INSULIN LISPRO 3 UNITS: 100 INJECTION, SOLUTION INTRAVENOUS; SUBCUTANEOUS at 21:09

## 2024-10-19 RX ADMIN — CYCLOBENZAPRINE HYDROCHLORIDE 5 MG: 5 TABLET, FILM COATED ORAL at 21:41

## 2024-10-19 RX ADMIN — INSULIN GLARGINE-YFGN 15 UNITS: 100 INJECTION, SOLUTION SUBCUTANEOUS at 18:19

## 2024-10-19 ASSESSMENT — COGNITIVE AND FUNCTIONAL STATUS - GENERAL
DRESSING REGULAR UPPER BODY CLOTHING: A LITTLE
DRESSING REGULAR LOWER BODY CLOTHING: A LITTLE
PERSONAL GROOMING: A LITTLE
TOILETING: A LITTLE
SUGGESTED CMS G CODE MODIFIER MOBILITY: CJ
CLIMB 3 TO 5 STEPS WITH RAILING: A LITTLE
WALKING IN HOSPITAL ROOM: A LITTLE
SUGGESTED CMS G CODE MODIFIER DAILY ACTIVITY: CK
DAILY ACTIVITIY SCORE: 18
EATING MEALS: A LITTLE
MOBILITY SCORE: 20
STANDING UP FROM CHAIR USING ARMS: A LITTLE
MOVING TO AND FROM BED TO CHAIR: A LITTLE
HELP NEEDED FOR BATHING: A LITTLE

## 2024-10-19 ASSESSMENT — ENCOUNTER SYMPTOMS
FEVER: 0
DEPRESSION: 0
VOMITING: 0
SPUTUM PRODUCTION: 0
COUGH: 0
WEAKNESS: 0
DIAPHORESIS: 0
ABDOMINAL PAIN: 0
SORE THROAT: 0
NAUSEA: 0
SHORTNESS OF BREATH: 0
MYALGIAS: 0
EYE DISCHARGE: 0
SENSORY CHANGE: 0
LOSS OF CONSCIOUSNESS: 0
WHEEZING: 0
CLAUDICATION: 0
FOCAL WEAKNESS: 0
CONSTIPATION: 0
BRUISES/BLEEDS EASILY: 0
PALPITATIONS: 0
HEMOPTYSIS: 0
NECK PAIN: 1
DIARRHEA: 0
CHILLS: 0
SPEECH CHANGE: 0
HEADACHES: 0
DIZZINESS: 0
FALLS: 1
EYE PAIN: 0
BACK PAIN: 1

## 2024-10-19 ASSESSMENT — PATIENT HEALTH QUESTIONNAIRE - PHQ9
7. TROUBLE CONCENTRATING ON THINGS, SUCH AS READING THE NEWSPAPER OR WATCHING TELEVISION: MORE THAN HALF THE DAYS
8. MOVING OR SPEAKING SO SLOWLY THAT OTHER PEOPLE COULD HAVE NOTICED. OR THE OPPOSITE, BEING SO FIGETY OR RESTLESS THAT YOU HAVE BEEN MOVING AROUND A LOT MORE THAN USUAL: NOT AT ALL
5. POOR APPETITE OR OVEREATING: MORE THAN HALF THE DAYS
2. FEELING DOWN, DEPRESSED, IRRITABLE, OR HOPELESS: MORE THAN HALF THE DAYS
SUM OF ALL RESPONSES TO PHQ9 QUESTIONS 1 AND 2: 4
3. TROUBLE FALLING OR STAYING ASLEEP OR SLEEPING TOO MUCH: MORE THAN HALF THE DAYS
1. LITTLE INTEREST OR PLEASURE IN DOING THINGS: MORE THAN HALF THE DAYS
6. FEELING BAD ABOUT YOURSELF - OR THAT YOU ARE A FAILURE OR HAVE LET YOURSELF OR YOUR FAMILY DOWN: MORE THAN HALF THE DAYS
SUM OF ALL RESPONSES TO PHQ QUESTIONS 1-9: 14
9. THOUGHTS THAT YOU WOULD BE BETTER OFF DEAD, OR OF HURTING YOURSELF: NOT AT ALL
4. FEELING TIRED OR HAVING LITTLE ENERGY: MORE THAN HALF THE DAYS

## 2024-10-19 ASSESSMENT — GAIT ASSESSMENTS
GAIT LEVEL OF ASSIST: CONTACT GUARD ASSIST
ASSISTIVE DEVICE: WHEELCHAIR PUSH
DEVIATION: INCREASED BASE OF SUPPORT
DISTANCE (FEET): 60

## 2024-10-19 ASSESSMENT — LIFESTYLE VARIABLES
HAVE YOU EVER FELT YOU SHOULD CUT DOWN ON YOUR DRINKING: NO
DOES PATIENT WANT TO STOP DRINKING: CANNOT ASSESS
HAVE PEOPLE ANNOYED YOU BY CRITICIZING YOUR DRINKING: NO
ON A TYPICAL DAY WHEN YOU DRINK ALCOHOL HOW MANY DRINKS DO YOU HAVE: 0
EVER HAD A DRINK FIRST THING IN THE MORNING TO STEADY YOUR NERVES TO GET RID OF A HANGOVER: NO
TOTAL SCORE: 0
HOW MANY TIMES IN THE PAST YEAR HAVE YOU HAD 5 OR MORE DRINKS IN A DAY: 0
SUBSTANCE_ABUSE: 0
ALCOHOL_USE: NO
AVERAGE NUMBER OF DAYS PER WEEK YOU HAVE A DRINK CONTAINING ALCOHOL: 0
TOTAL SCORE: 0
CONSUMPTION TOTAL: NEGATIVE
EVER FELT BAD OR GUILTY ABOUT YOUR DRINKING: NO
TOTAL SCORE: 0

## 2024-10-19 ASSESSMENT — SOCIAL DETERMINANTS OF HEALTH (SDOH)
WITHIN THE LAST YEAR, HAVE YOU BEEN AFRAID OF YOUR PARTNER OR EX-PARTNER?: NO
WITHIN THE PAST 12 MONTHS, THE FOOD YOU BOUGHT JUST DIDN'T LAST AND YOU DIDN'T HAVE MONEY TO GET MORE: NEVER TRUE
WITHIN THE LAST YEAR, HAVE YOU BEEN HUMILIATED OR EMOTIONALLY ABUSED IN OTHER WAYS BY YOUR PARTNER OR EX-PARTNER?: NO
WITHIN THE PAST 12 MONTHS, YOU WORRIED THAT YOUR FOOD WOULD RUN OUT BEFORE YOU GOT THE MONEY TO BUY MORE: SOMETIMES TRUE
WITHIN THE LAST YEAR, HAVE TO BEEN RAPED OR FORCED TO HAVE ANY KIND OF SEXUAL ACTIVITY BY YOUR PARTNER OR EX-PARTNER?: NO
WITHIN THE LAST YEAR, HAVE YOU BEEN KICKED, HIT, SLAPPED, OR OTHERWISE PHYSICALLY HURT BY YOUR PARTNER OR EX-PARTNER?: NO
IN THE PAST 12 MONTHS, HAS THE ELECTRIC, GAS, OIL, OR WATER COMPANY THREATENED TO SHUT OFF SERVICE IN YOUR HOME?: NO

## 2024-10-19 ASSESSMENT — FIBROSIS 4 INDEX: FIB4 SCORE: 1.05

## 2024-10-19 ASSESSMENT — PAIN DESCRIPTION - PAIN TYPE
TYPE: ACUTE PAIN

## 2024-10-19 ASSESSMENT — ACTIVITIES OF DAILY LIVING (ADL): TOILETING: INDEPENDENT

## 2024-10-20 ENCOUNTER — APPOINTMENT (OUTPATIENT)
Dept: RADIOLOGY | Facility: MEDICAL CENTER | Age: 59
End: 2024-10-20
Attending: INTERNAL MEDICINE
Payer: COMMERCIAL

## 2024-10-20 LAB
GLUCOSE BLD STRIP.AUTO-MCNC: 180 MG/DL (ref 65–99)
GLUCOSE BLD STRIP.AUTO-MCNC: 198 MG/DL (ref 65–99)
GLUCOSE BLD STRIP.AUTO-MCNC: 236 MG/DL (ref 65–99)
GLUCOSE BLD STRIP.AUTO-MCNC: 295 MG/DL (ref 65–99)

## 2024-10-20 PROCEDURE — 96375 TX/PRO/DX INJ NEW DRUG ADDON: CPT

## 2024-10-20 PROCEDURE — 96372 THER/PROPH/DIAG INJ SC/IM: CPT

## 2024-10-20 PROCEDURE — 700105 HCHG RX REV CODE 258

## 2024-10-20 PROCEDURE — A9270 NON-COVERED ITEM OR SERVICE: HCPCS

## 2024-10-20 PROCEDURE — G0378 HOSPITAL OBSERVATION PER HR: HCPCS

## 2024-10-20 PROCEDURE — 700102 HCHG RX REV CODE 250 W/ 637 OVERRIDE(OP)

## 2024-10-20 PROCEDURE — 99232 SBSQ HOSP IP/OBS MODERATE 35: CPT | Mod: GC | Performed by: INTERNAL MEDICINE

## 2024-10-20 PROCEDURE — 700102 HCHG RX REV CODE 250 W/ 637 OVERRIDE(OP): Performed by: EMERGENCY MEDICINE

## 2024-10-20 PROCEDURE — A9270 NON-COVERED ITEM OR SERVICE: HCPCS | Performed by: EMERGENCY MEDICINE

## 2024-10-20 PROCEDURE — 73502 X-RAY EXAM HIP UNI 2-3 VIEWS: CPT | Mod: RT

## 2024-10-20 PROCEDURE — 82962 GLUCOSE BLOOD TEST: CPT

## 2024-10-20 PROCEDURE — 700102 HCHG RX REV CODE 250 W/ 637 OVERRIDE(OP): Performed by: HOSPITALIST

## 2024-10-20 PROCEDURE — 700111 HCHG RX REV CODE 636 W/ 250 OVERRIDE (IP): Mod: JZ

## 2024-10-20 PROCEDURE — 96376 TX/PRO/DX INJ SAME DRUG ADON: CPT

## 2024-10-20 PROCEDURE — A9270 NON-COVERED ITEM OR SERVICE: HCPCS | Performed by: HOSPITALIST

## 2024-10-20 RX ORDER — GABAPENTIN 100 MG/1
200 CAPSULE ORAL 3 TIMES DAILY
Status: DISCONTINUED | OUTPATIENT
Start: 2024-10-20 | End: 2024-10-24 | Stop reason: HOSPADM

## 2024-10-20 RX ORDER — OXYCODONE HYDROCHLORIDE 5 MG/1
5 TABLET ORAL EVERY 4 HOURS PRN
Status: DISCONTINUED | OUTPATIENT
Start: 2024-10-20 | End: 2024-10-24 | Stop reason: HOSPADM

## 2024-10-20 RX ORDER — ENOXAPARIN SODIUM 100 MG/ML
40 INJECTION SUBCUTANEOUS DAILY
Status: DISCONTINUED | OUTPATIENT
Start: 2024-10-20 | End: 2024-10-24 | Stop reason: HOSPADM

## 2024-10-20 RX ORDER — SODIUM CHLORIDE 9 MG/ML
INJECTION, SOLUTION INTRAVENOUS CONTINUOUS
Status: DISPENSED | OUTPATIENT
Start: 2024-10-20 | End: 2024-10-20

## 2024-10-20 RX ORDER — KETOROLAC TROMETHAMINE 15 MG/ML
15 INJECTION, SOLUTION INTRAMUSCULAR; INTRAVENOUS EVERY 6 HOURS PRN
Status: DISCONTINUED | OUTPATIENT
Start: 2024-10-20 | End: 2024-10-24 | Stop reason: HOSPADM

## 2024-10-20 RX ADMIN — ENOXAPARIN SODIUM 40 MG: 100 INJECTION SUBCUTANEOUS at 17:46

## 2024-10-20 RX ADMIN — INSULIN LISPRO 3 UNITS: 100 INJECTION, SOLUTION INTRAVENOUS; SUBCUTANEOUS at 12:09

## 2024-10-20 RX ADMIN — AMOXICILLIN AND CLAVULANATE POTASSIUM 1 TABLET: 875; 125 TABLET, FILM COATED ORAL at 17:46

## 2024-10-20 RX ADMIN — SODIUM CHLORIDE: 9 INJECTION, SOLUTION INTRAVENOUS at 08:01

## 2024-10-20 RX ADMIN — INSULIN LISPRO 5 UNITS: 100 INJECTION, SOLUTION INTRAVENOUS; SUBCUTANEOUS at 17:58

## 2024-10-20 RX ADMIN — GABAPENTIN 200 MG: 100 CAPSULE ORAL at 17:47

## 2024-10-20 RX ADMIN — GABAPENTIN 200 MG: 100 CAPSULE ORAL at 11:32

## 2024-10-20 RX ADMIN — INSULIN LISPRO 2 UNITS: 100 INJECTION, SOLUTION INTRAVENOUS; SUBCUTANEOUS at 20:52

## 2024-10-20 RX ADMIN — KETOROLAC TROMETHAMINE 15 MG: 15 INJECTION, SOLUTION INTRAMUSCULAR; INTRAVENOUS at 20:58

## 2024-10-20 RX ADMIN — AMOXICILLIN AND CLAVULANATE POTASSIUM 1 TABLET: 875; 125 TABLET, FILM COATED ORAL at 05:33

## 2024-10-20 RX ADMIN — OXYCODONE 5 MG: 5 TABLET ORAL at 22:29

## 2024-10-20 RX ADMIN — KETOROLAC TROMETHAMINE 15 MG: 15 INJECTION, SOLUTION INTRAMUSCULAR; INTRAVENOUS at 13:28

## 2024-10-20 RX ADMIN — ASPIRIN 81 MG: 81 TABLET, COATED ORAL at 05:33

## 2024-10-20 RX ADMIN — INSULIN LISPRO 2 UNITS: 100 INJECTION, SOLUTION INTRAVENOUS; SUBCUTANEOUS at 05:36

## 2024-10-20 RX ADMIN — INSULIN GLARGINE-YFGN 20 UNITS: 100 INJECTION, SOLUTION SUBCUTANEOUS at 17:58

## 2024-10-20 RX ADMIN — OXYCODONE HYDROCHLORIDE 10 MG: 10 TABLET ORAL at 01:45

## 2024-10-20 RX ADMIN — GABAPENTIN 100 MG: 100 CAPSULE ORAL at 05:33

## 2024-10-20 ASSESSMENT — COGNITIVE AND FUNCTIONAL STATUS - GENERAL
DAILY ACTIVITIY SCORE: 18
SUGGESTED CMS G CODE MODIFIER DAILY ACTIVITY: CL
TURNING FROM BACK TO SIDE WHILE IN FLAT BAD: A LITTLE
STANDING UP FROM CHAIR USING ARMS: A LITTLE
TURNING FROM BACK TO SIDE WHILE IN FLAT BAD: A LITTLE
SUGGESTED CMS G CODE MODIFIER MOBILITY: CK
CLIMB 3 TO 5 STEPS WITH RAILING: A LITTLE
DRESSING REGULAR UPPER BODY CLOTHING: A LOT
MOBILITY SCORE: 18
DRESSING REGULAR LOWER BODY CLOTHING: A LOT
PERSONAL GROOMING: A LOT
WALKING IN HOSPITAL ROOM: A LOT
EATING MEALS: A LOT
TOILETING: A LOT
MOVING FROM LYING ON BACK TO SITTING ON SIDE OF FLAT BED: A LITTLE
DRESSING REGULAR UPPER BODY CLOTHING: A LITTLE
SUGGESTED CMS G CODE MODIFIER DAILY ACTIVITY: CK
DRESSING REGULAR LOWER BODY CLOTHING: A LITTLE
CLIMB 3 TO 5 STEPS WITH RAILING: A LOT
STANDING UP FROM CHAIR USING ARMS: A LOT
MOVING TO AND FROM BED TO CHAIR: A LOT
DAILY ACTIVITIY SCORE: 12
MOVING TO AND FROM BED TO CHAIR: A LITTLE
EATING MEALS: A LITTLE
WALKING IN HOSPITAL ROOM: A LITTLE
TOILETING: A LITTLE
HELP NEEDED FOR BATHING: A LITTLE
PERSONAL GROOMING: A LITTLE
HELP NEEDED FOR BATHING: A LOT

## 2024-10-20 ASSESSMENT — ENCOUNTER SYMPTOMS
FEVER: 0
HEADACHES: 0
FLANK PAIN: 0
BLURRED VISION: 0
CHILLS: 0
COUGH: 0
VOMITING: 0
DOUBLE VISION: 0
SHORTNESS OF BREATH: 0
PALPITATIONS: 0
SORE THROAT: 0
WEAKNESS: 0
DIZZINESS: 0
NAUSEA: 0
DEPRESSION: 0
ABDOMINAL PAIN: 0

## 2024-10-20 ASSESSMENT — PAIN DESCRIPTION - PAIN TYPE
TYPE: ACUTE PAIN

## 2024-10-21 ENCOUNTER — TELEPHONE (OUTPATIENT)
Dept: MEDICAL GROUP | Facility: LAB | Age: 59
End: 2024-10-21
Payer: COMMERCIAL

## 2024-10-21 LAB
ANION GAP SERPL CALC-SCNC: 11 MMOL/L (ref 7–16)
BUN SERPL-MCNC: 25 MG/DL (ref 8–22)
CALCIUM SERPL-MCNC: 8.3 MG/DL (ref 8.5–10.5)
CHLORIDE SERPL-SCNC: 96 MMOL/L (ref 96–112)
CO2 SERPL-SCNC: 28 MMOL/L (ref 20–33)
CREAT SERPL-MCNC: 1.18 MG/DL (ref 0.5–1.4)
GFR SERPLBLD CREATININE-BSD FMLA CKD-EPI: 70 ML/MIN/1.73 M 2
GLUCOSE BLD STRIP.AUTO-MCNC: 223 MG/DL (ref 65–99)
GLUCOSE BLD STRIP.AUTO-MCNC: 243 MG/DL (ref 65–99)
GLUCOSE BLD STRIP.AUTO-MCNC: 247 MG/DL (ref 65–99)
GLUCOSE BLD STRIP.AUTO-MCNC: 303 MG/DL (ref 65–99)
GLUCOSE SERPL-MCNC: 236 MG/DL (ref 65–99)
POTASSIUM SERPL-SCNC: 4.9 MMOL/L (ref 3.6–5.5)
SODIUM SERPL-SCNC: 135 MMOL/L (ref 135–145)

## 2024-10-21 PROCEDURE — 700111 HCHG RX REV CODE 636 W/ 250 OVERRIDE (IP): Mod: JZ

## 2024-10-21 PROCEDURE — 82962 GLUCOSE BLOOD TEST: CPT | Mod: 91

## 2024-10-21 PROCEDURE — 96372 THER/PROPH/DIAG INJ SC/IM: CPT

## 2024-10-21 PROCEDURE — 700102 HCHG RX REV CODE 250 W/ 637 OVERRIDE(OP)

## 2024-10-21 PROCEDURE — 80048 BASIC METABOLIC PNL TOTAL CA: CPT

## 2024-10-21 PROCEDURE — 96376 TX/PRO/DX INJ SAME DRUG ADON: CPT

## 2024-10-21 PROCEDURE — 99232 SBSQ HOSP IP/OBS MODERATE 35: CPT | Performed by: STUDENT IN AN ORGANIZED HEALTH CARE EDUCATION/TRAINING PROGRAM

## 2024-10-21 PROCEDURE — G0378 HOSPITAL OBSERVATION PER HR: HCPCS

## 2024-10-21 PROCEDURE — 700102 HCHG RX REV CODE 250 W/ 637 OVERRIDE(OP): Performed by: HOSPITALIST

## 2024-10-21 PROCEDURE — A9270 NON-COVERED ITEM OR SERVICE: HCPCS

## 2024-10-21 PROCEDURE — A9270 NON-COVERED ITEM OR SERVICE: HCPCS | Performed by: HOSPITALIST

## 2024-10-21 PROCEDURE — 36415 COLL VENOUS BLD VENIPUNCTURE: CPT

## 2024-10-21 RX ADMIN — GABAPENTIN 200 MG: 100 CAPSULE ORAL at 05:48

## 2024-10-21 RX ADMIN — INSULIN LISPRO 6 UNITS: 100 INJECTION, SOLUTION INTRAVENOUS; SUBCUTANEOUS at 17:39

## 2024-10-21 RX ADMIN — ASPIRIN 81 MG: 81 TABLET, COATED ORAL at 05:48

## 2024-10-21 RX ADMIN — ENOXAPARIN SODIUM 40 MG: 100 INJECTION SUBCUTANEOUS at 17:36

## 2024-10-21 RX ADMIN — KETOROLAC TROMETHAMINE 15 MG: 15 INJECTION, SOLUTION INTRAMUSCULAR; INTRAVENOUS at 20:31

## 2024-10-21 RX ADMIN — INSULIN LISPRO 3 UNITS: 100 INJECTION, SOLUTION INTRAVENOUS; SUBCUTANEOUS at 07:54

## 2024-10-21 RX ADMIN — INSULIN LISPRO 3 UNITS: 100 INJECTION, SOLUTION INTRAVENOUS; SUBCUTANEOUS at 11:43

## 2024-10-21 RX ADMIN — OXYCODONE 5 MG: 5 TABLET ORAL at 18:53

## 2024-10-21 RX ADMIN — INSULIN GLARGINE-YFGN 20 UNITS: 100 INJECTION, SOLUTION SUBCUTANEOUS at 17:40

## 2024-10-21 RX ADMIN — INSULIN LISPRO 3 UNITS: 100 INJECTION, SOLUTION INTRAVENOUS; SUBCUTANEOUS at 20:22

## 2024-10-21 RX ADMIN — OXYCODONE 5 MG: 5 TABLET ORAL at 23:13

## 2024-10-21 RX ADMIN — GABAPENTIN 200 MG: 100 CAPSULE ORAL at 17:36

## 2024-10-21 RX ADMIN — GABAPENTIN 200 MG: 100 CAPSULE ORAL at 11:46

## 2024-10-21 RX ADMIN — OXYCODONE 5 MG: 5 TABLET ORAL at 11:51

## 2024-10-21 ASSESSMENT — PAIN DESCRIPTION - PAIN TYPE
TYPE: ACUTE PAIN

## 2024-10-21 ASSESSMENT — ENCOUNTER SYMPTOMS
HEADACHES: 0
SHORTNESS OF BREATH: 0
DEPRESSION: 0
WEAKNESS: 0
DOUBLE VISION: 0
NAUSEA: 0
DIZZINESS: 0
PALPITATIONS: 0
COUGH: 0
ABDOMINAL PAIN: 0
FLANK PAIN: 0
FEVER: 0
SORE THROAT: 0
CHILLS: 0
BLURRED VISION: 0
VOMITING: 0

## 2024-10-22 LAB
GLUCOSE BLD STRIP.AUTO-MCNC: 138 MG/DL (ref 65–99)
GLUCOSE BLD STRIP.AUTO-MCNC: 145 MG/DL (ref 65–99)
GLUCOSE BLD STRIP.AUTO-MCNC: 186 MG/DL (ref 65–99)
GLUCOSE BLD STRIP.AUTO-MCNC: 226 MG/DL (ref 65–99)

## 2024-10-22 PROCEDURE — A9270 NON-COVERED ITEM OR SERVICE: HCPCS

## 2024-10-22 PROCEDURE — 96372 THER/PROPH/DIAG INJ SC/IM: CPT

## 2024-10-22 PROCEDURE — 99232 SBSQ HOSP IP/OBS MODERATE 35: CPT

## 2024-10-22 PROCEDURE — 96376 TX/PRO/DX INJ SAME DRUG ADON: CPT

## 2024-10-22 PROCEDURE — 700111 HCHG RX REV CODE 636 W/ 250 OVERRIDE (IP): Mod: JZ

## 2024-10-22 PROCEDURE — 700102 HCHG RX REV CODE 250 W/ 637 OVERRIDE(OP)

## 2024-10-22 PROCEDURE — A9270 NON-COVERED ITEM OR SERVICE: HCPCS | Performed by: HOSPITALIST

## 2024-10-22 PROCEDURE — 82962 GLUCOSE BLOOD TEST: CPT | Mod: 91

## 2024-10-22 PROCEDURE — G0378 HOSPITAL OBSERVATION PER HR: HCPCS

## 2024-10-22 PROCEDURE — 700102 HCHG RX REV CODE 250 W/ 637 OVERRIDE(OP): Performed by: HOSPITALIST

## 2024-10-22 RX ORDER — DEXTROSE MONOHYDRATE 25 G/50ML
25 INJECTION, SOLUTION INTRAVENOUS
Status: DISCONTINUED | OUTPATIENT
Start: 2024-10-22 | End: 2024-10-23

## 2024-10-22 RX ORDER — INSULIN LISPRO 100 [IU]/ML
5 INJECTION, SOLUTION INTRAVENOUS; SUBCUTANEOUS
Status: DISCONTINUED | OUTPATIENT
Start: 2024-10-22 | End: 2024-10-23

## 2024-10-22 RX ORDER — AMOXICILLIN 250 MG
2 CAPSULE ORAL 2 TIMES DAILY
Status: DISCONTINUED | OUTPATIENT
Start: 2024-10-22 | End: 2024-10-24 | Stop reason: HOSPADM

## 2024-10-22 RX ORDER — INSULIN LISPRO 100 [IU]/ML
2-9 INJECTION, SOLUTION INTRAVENOUS; SUBCUTANEOUS
Status: DISCONTINUED | OUTPATIENT
Start: 2024-10-22 | End: 2024-10-23

## 2024-10-22 RX ORDER — POLYETHYLENE GLYCOL 3350 17 G/17G
1 POWDER, FOR SOLUTION ORAL
Status: DISCONTINUED | OUTPATIENT
Start: 2024-10-22 | End: 2024-10-24 | Stop reason: HOSPADM

## 2024-10-22 RX ADMIN — GABAPENTIN 200 MG: 100 CAPSULE ORAL at 04:54

## 2024-10-22 RX ADMIN — INSULIN LISPRO 3 UNITS: 100 INJECTION, SOLUTION INTRAVENOUS; SUBCUTANEOUS at 18:00

## 2024-10-22 RX ADMIN — OXYCODONE 5 MG: 5 TABLET ORAL at 19:48

## 2024-10-22 RX ADMIN — GABAPENTIN 200 MG: 100 CAPSULE ORAL at 17:59

## 2024-10-22 RX ADMIN — ENOXAPARIN SODIUM 40 MG: 100 INJECTION SUBCUTANEOUS at 17:59

## 2024-10-22 RX ADMIN — ASPIRIN 81 MG: 81 TABLET, COATED ORAL at 04:55

## 2024-10-22 RX ADMIN — INSULIN GLARGINE-YFGN 20 UNITS: 100 INJECTION, SOLUTION SUBCUTANEOUS at 18:02

## 2024-10-22 RX ADMIN — KETOROLAC TROMETHAMINE 15 MG: 15 INJECTION, SOLUTION INTRAMUSCULAR; INTRAVENOUS at 07:01

## 2024-10-22 RX ADMIN — INSULIN LISPRO 5 UNITS: 100 INJECTION, SOLUTION INTRAVENOUS; SUBCUTANEOUS at 18:05

## 2024-10-22 RX ADMIN — OXYCODONE 5 MG: 5 TABLET ORAL at 04:55

## 2024-10-22 RX ADMIN — OXYCODONE 5 MG: 5 TABLET ORAL at 23:59

## 2024-10-22 RX ADMIN — INSULIN LISPRO 5 UNITS: 100 INJECTION, SOLUTION INTRAVENOUS; SUBCUTANEOUS at 08:14

## 2024-10-22 RX ADMIN — GABAPENTIN 200 MG: 100 CAPSULE ORAL at 12:04

## 2024-10-22 RX ADMIN — OXYCODONE 5 MG: 5 TABLET ORAL at 12:04

## 2024-10-22 RX ADMIN — POLYETHYLENE GLYCOL 3350 1 PACKET: 17 POWDER, FOR SOLUTION ORAL at 12:05

## 2024-10-22 RX ADMIN — INSULIN LISPRO 2 UNITS: 100 INJECTION, SOLUTION INTRAVENOUS; SUBCUTANEOUS at 08:12

## 2024-10-22 ASSESSMENT — ENCOUNTER SYMPTOMS
SHORTNESS OF BREATH: 0
DOUBLE VISION: 0
HEADACHES: 0
FEVER: 0
PALPITATIONS: 0
ABDOMINAL PAIN: 0
DEPRESSION: 0
FLANK PAIN: 0
VOMITING: 0
WEAKNESS: 0
NAUSEA: 0
DIZZINESS: 0
CHILLS: 0
SORE THROAT: 0
COUGH: 0
BLURRED VISION: 0

## 2024-10-22 ASSESSMENT — PAIN DESCRIPTION - PAIN TYPE
TYPE: ACUTE PAIN

## 2024-10-23 ENCOUNTER — APPOINTMENT (OUTPATIENT)
Dept: RADIOLOGY | Facility: MEDICAL CENTER | Age: 59
End: 2024-10-23
Payer: COMMERCIAL

## 2024-10-23 PROBLEM — R07.89 OTHER CHEST PAIN: Status: ACTIVE | Noted: 2022-10-05

## 2024-10-23 PROBLEM — Z66 DNR (DO NOT RESUSCITATE): Status: ACTIVE | Noted: 2024-10-23

## 2024-10-23 PROBLEM — R91.8 OPACITY OF LUNG ON IMAGING STUDY: Status: ACTIVE | Noted: 2024-10-23

## 2024-10-23 LAB
ALBUMIN SERPL BCP-MCNC: 3 G/DL (ref 3.2–4.9)
ALP SERPL-CCNC: 72 U/L (ref 30–99)
ALT SERPL-CCNC: <5 U/L (ref 2–50)
ANION GAP SERPL CALC-SCNC: 8 MMOL/L (ref 7–16)
AST SERPL-CCNC: 10 U/L (ref 12–45)
BASOPHILS # BLD AUTO: 0.1 % (ref 0–1.8)
BASOPHILS # BLD: 0.01 K/UL (ref 0–0.12)
BILIRUB CONJ SERPL-MCNC: <0.2 MG/DL (ref 0.1–0.5)
BILIRUB INDIRECT SERPL-MCNC: ABNORMAL MG/DL (ref 0–1)
BILIRUB SERPL-MCNC: 0.3 MG/DL (ref 0.1–1.5)
BUN SERPL-MCNC: 24 MG/DL (ref 8–22)
CALCIUM SERPL-MCNC: 8.3 MG/DL (ref 8.5–10.5)
CHLORIDE SERPL-SCNC: 99 MMOL/L (ref 96–112)
CO2 SERPL-SCNC: 27 MMOL/L (ref 20–33)
CREAT SERPL-MCNC: 0.94 MG/DL (ref 0.5–1.4)
EKG IMPRESSION: NORMAL
EKG IMPRESSION: NORMAL
EOSINOPHIL # BLD AUTO: 0.11 K/UL (ref 0–0.51)
EOSINOPHIL NFR BLD: 1 % (ref 0–6.9)
ERYTHROCYTE [DISTWIDTH] IN BLOOD BY AUTOMATED COUNT: 40.9 FL (ref 35.9–50)
GFR SERPLBLD CREATININE-BSD FMLA CKD-EPI: 93 ML/MIN/1.73 M 2
GLUCOSE BLD STRIP.AUTO-MCNC: 112 MG/DL (ref 65–99)
GLUCOSE BLD STRIP.AUTO-MCNC: 118 MG/DL (ref 65–99)
GLUCOSE BLD STRIP.AUTO-MCNC: 156 MG/DL (ref 65–99)
GLUCOSE BLD STRIP.AUTO-MCNC: 216 MG/DL (ref 65–99)
GLUCOSE BLD STRIP.AUTO-MCNC: 217 MG/DL (ref 65–99)
GLUCOSE BLD STRIP.AUTO-MCNC: 253 MG/DL (ref 65–99)
GLUCOSE SERPL-MCNC: 119 MG/DL (ref 65–99)
HCT VFR BLD AUTO: 31.4 % (ref 42–52)
HGB BLD-MCNC: 10.2 G/DL (ref 14–18)
IMM GRANULOCYTES # BLD AUTO: 0.05 K/UL (ref 0–0.11)
IMM GRANULOCYTES NFR BLD AUTO: 0.4 % (ref 0–0.9)
LACTATE SERPL-SCNC: 0.9 MMOL/L (ref 0.5–2)
LACTATE SERPL-SCNC: 1.7 MMOL/L (ref 0.5–2)
LYMPHOCYTES # BLD AUTO: 0.98 K/UL (ref 1–4.8)
LYMPHOCYTES NFR BLD: 8.6 % (ref 22–41)
MAGNESIUM SERPL-MCNC: 1.4 MG/DL (ref 1.5–2.5)
MCH RBC QN AUTO: 27 PG (ref 27–33)
MCHC RBC AUTO-ENTMCNC: 32.5 G/DL (ref 32.3–36.5)
MCV RBC AUTO: 83.1 FL (ref 81.4–97.8)
MONOCYTES # BLD AUTO: 0.91 K/UL (ref 0–0.85)
MONOCYTES NFR BLD AUTO: 8 % (ref 0–13.4)
NEUTROPHILS # BLD AUTO: 9.37 K/UL (ref 1.82–7.42)
NEUTROPHILS NFR BLD: 81.9 % (ref 44–72)
NRBC # BLD AUTO: 0 K/UL
NRBC BLD-RTO: 0 /100 WBC (ref 0–0.2)
PLATELET # BLD AUTO: 216 K/UL (ref 164–446)
PMV BLD AUTO: 8.6 FL (ref 9–12.9)
POTASSIUM SERPL-SCNC: 4 MMOL/L (ref 3.6–5.5)
PROCALCITONIN SERPL-MCNC: 0.19 NG/ML
PROT SERPL-MCNC: 5.6 G/DL (ref 6–8.2)
RBC # BLD AUTO: 3.78 M/UL (ref 4.7–6.1)
SODIUM SERPL-SCNC: 134 MMOL/L (ref 135–145)
TROPONIN T SERPL-MCNC: 64 NG/L (ref 6–19)
TROPONIN T SERPL-MCNC: 78 NG/L (ref 6–19)
WBC # BLD AUTO: 11.4 K/UL (ref 4.8–10.8)

## 2024-10-23 PROCEDURE — 700111 HCHG RX REV CODE 636 W/ 250 OVERRIDE (IP): Mod: JZ

## 2024-10-23 PROCEDURE — 99233 SBSQ HOSP IP/OBS HIGH 50: CPT

## 2024-10-23 PROCEDURE — 96375 TX/PRO/DX INJ NEW DRUG ADDON: CPT

## 2024-10-23 PROCEDURE — 700102 HCHG RX REV CODE 250 W/ 637 OVERRIDE(OP)

## 2024-10-23 PROCEDURE — 83605 ASSAY OF LACTIC ACID: CPT | Mod: 91

## 2024-10-23 PROCEDURE — A9270 NON-COVERED ITEM OR SERVICE: HCPCS

## 2024-10-23 PROCEDURE — 80048 BASIC METABOLIC PNL TOTAL CA: CPT

## 2024-10-23 PROCEDURE — 84484 ASSAY OF TROPONIN QUANT: CPT

## 2024-10-23 PROCEDURE — G0378 HOSPITAL OBSERVATION PER HR: HCPCS

## 2024-10-23 PROCEDURE — A9270 NON-COVERED ITEM OR SERVICE: HCPCS | Performed by: HOSPITALIST

## 2024-10-23 PROCEDURE — 96365 THER/PROPH/DIAG IV INF INIT: CPT

## 2024-10-23 PROCEDURE — 73502 X-RAY EXAM HIP UNI 2-3 VIEWS: CPT | Mod: RT

## 2024-10-23 PROCEDURE — 700102 HCHG RX REV CODE 250 W/ 637 OVERRIDE(OP): Performed by: HOSPITALIST

## 2024-10-23 PROCEDURE — 71045 X-RAY EXAM CHEST 1 VIEW: CPT

## 2024-10-23 PROCEDURE — 36415 COLL VENOUS BLD VENIPUNCTURE: CPT

## 2024-10-23 PROCEDURE — 82962 GLUCOSE BLOOD TEST: CPT

## 2024-10-23 PROCEDURE — 83735 ASSAY OF MAGNESIUM: CPT

## 2024-10-23 PROCEDURE — 96366 THER/PROPH/DIAG IV INF ADDON: CPT

## 2024-10-23 PROCEDURE — 93005 ELECTROCARDIOGRAM TRACING: CPT

## 2024-10-23 PROCEDURE — 96372 THER/PROPH/DIAG INJ SC/IM: CPT

## 2024-10-23 PROCEDURE — 700105 HCHG RX REV CODE 258

## 2024-10-23 PROCEDURE — 700111 HCHG RX REV CODE 636 W/ 250 OVERRIDE (IP)

## 2024-10-23 PROCEDURE — 80076 HEPATIC FUNCTION PANEL: CPT

## 2024-10-23 PROCEDURE — 85025 COMPLETE CBC W/AUTO DIFF WBC: CPT

## 2024-10-23 PROCEDURE — 84145 PROCALCITONIN (PCT): CPT

## 2024-10-23 PROCEDURE — 93010 ELECTROCARDIOGRAM REPORT: CPT | Performed by: STUDENT IN AN ORGANIZED HEALTH CARE EDUCATION/TRAINING PROGRAM

## 2024-10-23 RX ORDER — ASPIRIN 325 MG
325 TABLET ORAL ONCE
Status: DISCONTINUED | OUTPATIENT
Start: 2024-10-23 | End: 2024-10-23

## 2024-10-23 RX ORDER — MAGNESIUM SULFATE HEPTAHYDRATE 40 MG/ML
2 INJECTION, SOLUTION INTRAVENOUS ONCE
Status: COMPLETED | OUTPATIENT
Start: 2024-10-23 | End: 2024-10-23

## 2024-10-23 RX ORDER — ASPIRIN 300 MG/1
300 SUPPOSITORY RECTAL ONCE
Status: DISCONTINUED | OUTPATIENT
Start: 2024-10-23 | End: 2024-10-23

## 2024-10-23 RX ORDER — ASPIRIN 81 MG/1
243 TABLET, CHEWABLE ORAL ONCE
Status: ACTIVE | OUTPATIENT
Start: 2024-10-23 | End: 2024-10-24

## 2024-10-23 RX ORDER — SODIUM CHLORIDE 9 MG/ML
500 INJECTION, SOLUTION INTRAVENOUS ONCE
Status: COMPLETED | OUTPATIENT
Start: 2024-10-23 | End: 2024-10-23

## 2024-10-23 RX ORDER — ASPIRIN 81 MG/1
324 TABLET, CHEWABLE ORAL ONCE
Status: DISCONTINUED | OUTPATIENT
Start: 2024-10-23 | End: 2024-10-23

## 2024-10-23 RX ORDER — INSULIN LISPRO 100 [IU]/ML
2-9 INJECTION, SOLUTION INTRAVENOUS; SUBCUTANEOUS
Status: DISCONTINUED | OUTPATIENT
Start: 2024-10-23 | End: 2024-10-24 | Stop reason: HOSPADM

## 2024-10-23 RX ORDER — DEXTROSE MONOHYDRATE 25 G/50ML
25 INJECTION, SOLUTION INTRAVENOUS
Status: DISCONTINUED | OUTPATIENT
Start: 2024-10-23 | End: 2024-10-24 | Stop reason: HOSPADM

## 2024-10-23 RX ORDER — PANTOPRAZOLE SODIUM 40 MG/10ML
40 INJECTION, POWDER, LYOPHILIZED, FOR SOLUTION INTRAVENOUS ONCE
Status: COMPLETED | OUTPATIENT
Start: 2024-10-23 | End: 2024-10-23

## 2024-10-23 RX ORDER — INSULIN LISPRO 100 [IU]/ML
2 INJECTION, SOLUTION INTRAVENOUS; SUBCUTANEOUS
Status: DISCONTINUED | OUTPATIENT
Start: 2024-10-24 | End: 2024-10-24 | Stop reason: HOSPADM

## 2024-10-23 RX ORDER — ASPIRIN 81 MG/1
81 TABLET ORAL DAILY
Status: DISCONTINUED | OUTPATIENT
Start: 2024-10-24 | End: 2024-10-24 | Stop reason: HOSPADM

## 2024-10-23 RX ADMIN — OXYCODONE 5 MG: 5 TABLET ORAL at 04:38

## 2024-10-23 RX ADMIN — PANTOPRAZOLE SODIUM 40 MG: 40 INJECTION, POWDER, FOR SOLUTION INTRAVENOUS at 11:10

## 2024-10-23 RX ADMIN — INSULIN GLARGINE-YFGN 10 UNITS: 100 INJECTION, SOLUTION SUBCUTANEOUS at 20:00

## 2024-10-23 RX ADMIN — ENOXAPARIN SODIUM 40 MG: 100 INJECTION SUBCUTANEOUS at 17:47

## 2024-10-23 RX ADMIN — MAGNESIUM SULFATE HEPTAHYDRATE 2 G: 2 INJECTION, SOLUTION INTRAVENOUS at 15:41

## 2024-10-23 RX ADMIN — GABAPENTIN 200 MG: 100 CAPSULE ORAL at 11:44

## 2024-10-23 RX ADMIN — SODIUM CHLORIDE 500 ML: 9 INJECTION, SOLUTION INTRAVENOUS at 11:12

## 2024-10-23 RX ADMIN — OXYCODONE 5 MG: 5 TABLET ORAL at 16:39

## 2024-10-23 RX ADMIN — INSULIN LISPRO 2 UNITS: 100 INJECTION, SOLUTION INTRAVENOUS; SUBCUTANEOUS at 08:33

## 2024-10-23 RX ADMIN — INSULIN LISPRO 3 UNITS: 100 INJECTION, SOLUTION INTRAVENOUS; SUBCUTANEOUS at 22:48

## 2024-10-23 RX ADMIN — GABAPENTIN 200 MG: 100 CAPSULE ORAL at 17:47

## 2024-10-23 RX ADMIN — GABAPENTIN 200 MG: 100 CAPSULE ORAL at 04:38

## 2024-10-23 RX ADMIN — INSULIN LISPRO 5 UNITS: 100 INJECTION, SOLUTION INTRAVENOUS; SUBCUTANEOUS at 08:35

## 2024-10-23 RX ADMIN — ASPIRIN 81 MG: 81 TABLET, COATED ORAL at 04:38

## 2024-10-23 ASSESSMENT — PAIN DESCRIPTION - PAIN TYPE
TYPE: ACUTE PAIN

## 2024-10-23 ASSESSMENT — ENCOUNTER SYMPTOMS
HEADACHES: 0
WEAKNESS: 0
NAUSEA: 0
BLURRED VISION: 0
DEPRESSION: 0
DIZZINESS: 0
SORE THROAT: 0
FLANK PAIN: 0
CHILLS: 0
ABDOMINAL PAIN: 0
VOMITING: 0
FEVER: 0
DOUBLE VISION: 0
PALPITATIONS: 0
COUGH: 0
SHORTNESS OF BREATH: 0

## 2024-10-24 ENCOUNTER — APPOINTMENT (OUTPATIENT)
Dept: RADIOLOGY | Facility: MEDICAL CENTER | Age: 59
End: 2024-10-24
Payer: COMMERCIAL

## 2024-10-24 VITALS
DIASTOLIC BLOOD PRESSURE: 79 MMHG | WEIGHT: 173.06 LBS | SYSTOLIC BLOOD PRESSURE: 126 MMHG | OXYGEN SATURATION: 99 % | RESPIRATION RATE: 12 BRPM | HEART RATE: 73 BPM | HEIGHT: 72 IN | TEMPERATURE: 97.2 F | BODY MASS INDEX: 23.44 KG/M2

## 2024-10-24 PROBLEM — R07.9 CHEST PAIN: Status: RESOLVED | Noted: 2022-10-05 | Resolved: 2024-10-24

## 2024-10-24 PROBLEM — M54.2 NECK PAIN: Status: RESOLVED | Noted: 2024-10-18 | Resolved: 2024-10-24
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
## 2024-10-24 LAB
ANION GAP SERPL CALC-SCNC: 8 MMOL/L (ref 7–16)
BASOPHILS # BLD AUTO: 0.1 % (ref 0–1.8)
BASOPHILS # BLD: 0.01 K/UL (ref 0–0.12)
BUN SERPL-MCNC: 21 MG/DL (ref 8–22)
CALCIUM SERPL-MCNC: 8.4 MG/DL (ref 8.5–10.5)
CHLORIDE SERPL-SCNC: 101 MMOL/L (ref 96–112)
CHOLEST SERPL-MCNC: 144 MG/DL (ref 100–199)
CO2 SERPL-SCNC: 27 MMOL/L (ref 20–33)
CREAT SERPL-MCNC: 0.75 MG/DL (ref 0.5–1.4)
EOSINOPHIL # BLD AUTO: 0.06 K/UL (ref 0–0.51)
EOSINOPHIL NFR BLD: 0.7 % (ref 0–6.9)
ERYTHROCYTE [DISTWIDTH] IN BLOOD BY AUTOMATED COUNT: 41.6 FL (ref 35.9–50)
GFR SERPLBLD CREATININE-BSD FMLA CKD-EPI: 103 ML/MIN/1.73 M 2
GLUCOSE BLD STRIP.AUTO-MCNC: 130 MG/DL (ref 65–99)
GLUCOSE BLD STRIP.AUTO-MCNC: 209 MG/DL (ref 65–99)
GLUCOSE SERPL-MCNC: 142 MG/DL (ref 65–99)
HCT VFR BLD AUTO: 30.5 % (ref 42–52)
HDLC SERPL-MCNC: 52 MG/DL
HGB BLD-MCNC: 10.1 G/DL (ref 14–18)
IMM GRANULOCYTES # BLD AUTO: 0.04 K/UL (ref 0–0.11)
IMM GRANULOCYTES NFR BLD AUTO: 0.5 % (ref 0–0.9)
LDLC SERPL CALC-MCNC: 82 MG/DL
LYMPHOCYTES # BLD AUTO: 0.75 K/UL (ref 1–4.8)
LYMPHOCYTES NFR BLD: 9.1 % (ref 22–41)
MAGNESIUM SERPL-MCNC: 1.4 MG/DL (ref 1.5–2.5)
MCH RBC QN AUTO: 27.5 PG (ref 27–33)
MCHC RBC AUTO-ENTMCNC: 33.1 G/DL (ref 32.3–36.5)
MCV RBC AUTO: 83.1 FL (ref 81.4–97.8)
MONOCYTES # BLD AUTO: 0.62 K/UL (ref 0–0.85)
MONOCYTES NFR BLD AUTO: 7.5 % (ref 0–13.4)
NEUTROPHILS # BLD AUTO: 6.74 K/UL (ref 1.82–7.42)
NEUTROPHILS NFR BLD: 82.1 % (ref 44–72)
NRBC # BLD AUTO: 0 K/UL
NRBC BLD-RTO: 0 /100 WBC (ref 0–0.2)
PLATELET # BLD AUTO: 187 K/UL (ref 164–446)
PMV BLD AUTO: 8.6 FL (ref 9–12.9)
POTASSIUM SERPL-SCNC: 4.4 MMOL/L (ref 3.6–5.5)
RBC # BLD AUTO: 3.67 M/UL (ref 4.7–6.1)
SODIUM SERPL-SCNC: 136 MMOL/L (ref 135–145)
TRIGL SERPL-MCNC: 52 MG/DL (ref 0–149)
WBC # BLD AUTO: 8.2 K/UL (ref 4.8–10.8)

## 2024-10-24 PROCEDURE — A9270 NON-COVERED ITEM OR SERVICE: HCPCS

## 2024-10-24 PROCEDURE — 80061 LIPID PANEL: CPT

## 2024-10-24 PROCEDURE — 96372 THER/PROPH/DIAG INJ SC/IM: CPT

## 2024-10-24 PROCEDURE — G0378 HOSPITAL OBSERVATION PER HR: HCPCS

## 2024-10-24 PROCEDURE — 700111 HCHG RX REV CODE 636 W/ 250 OVERRIDE (IP)

## 2024-10-24 PROCEDURE — 96366 THER/PROPH/DIAG IV INF ADDON: CPT

## 2024-10-24 PROCEDURE — A9502 TC99M TETROFOSMIN: HCPCS

## 2024-10-24 PROCEDURE — 82962 GLUCOSE BLOOD TEST: CPT

## 2024-10-24 PROCEDURE — 97535 SELF CARE MNGMENT TRAINING: CPT

## 2024-10-24 PROCEDURE — 700111 HCHG RX REV CODE 636 W/ 250 OVERRIDE (IP): Mod: JZ

## 2024-10-24 PROCEDURE — 99239 HOSP IP/OBS DSCHRG MGMT >30: CPT

## 2024-10-24 PROCEDURE — 83735 ASSAY OF MAGNESIUM: CPT

## 2024-10-24 PROCEDURE — 96376 TX/PRO/DX INJ SAME DRUG ADON: CPT

## 2024-10-24 PROCEDURE — 80048 BASIC METABOLIC PNL TOTAL CA: CPT

## 2024-10-24 PROCEDURE — 85025 COMPLETE CBC W/AUTO DIFF WBC: CPT

## 2024-10-24 PROCEDURE — 700102 HCHG RX REV CODE 250 W/ 637 OVERRIDE(OP)

## 2024-10-24 PROCEDURE — 36415 COLL VENOUS BLD VENIPUNCTURE: CPT

## 2024-10-24 RX ORDER — MAGNESIUM SULFATE HEPTAHYDRATE 40 MG/ML
2 INJECTION, SOLUTION INTRAVENOUS ONCE
Status: DISCONTINUED | OUTPATIENT
Start: 2024-10-24 | End: 2024-10-24

## 2024-10-24 RX ORDER — LOPERAMIDE HYDROCHLORIDE 2 MG/1
2 CAPSULE ORAL 4 TIMES DAILY PRN
Qty: 30 CAPSULE | Refills: 0 | Status: SHIPPED | OUTPATIENT
Start: 2024-10-24

## 2024-10-24 RX ORDER — AMINOPHYLLINE 25 MG/ML
100 INJECTION, SOLUTION INTRAVENOUS
Status: DISCONTINUED | OUTPATIENT
Start: 2024-10-24 | End: 2024-10-24 | Stop reason: HOSPADM

## 2024-10-24 RX ORDER — IBUPROFEN 600 MG/1
600 TABLET, FILM COATED ORAL EVERY 6 HOURS PRN
Qty: 30 TABLET | Refills: 0 | Status: SHIPPED | OUTPATIENT
Start: 2024-10-24

## 2024-10-24 RX ORDER — MAGNESIUM SULFATE HEPTAHYDRATE 40 MG/ML
4 INJECTION, SOLUTION INTRAVENOUS ONCE
Status: COMPLETED | OUTPATIENT
Start: 2024-10-24 | End: 2024-10-24

## 2024-10-24 RX ORDER — ACETAMINOPHEN 325 MG/1
650 TABLET ORAL EVERY 6 HOURS PRN
Qty: 30 TABLET | Refills: 0 | Status: SHIPPED | OUTPATIENT
Start: 2024-10-24

## 2024-10-24 RX ORDER — BLOOD SUGAR DIAGNOSTIC
1 STRIP MISCELLANEOUS PRN
Qty: 100 STRIP | Refills: 0 | Status: SHIPPED | OUTPATIENT
Start: 2024-10-24

## 2024-10-24 RX ORDER — REGADENOSON 0.08 MG/ML
0.4 INJECTION, SOLUTION INTRAVENOUS ONCE
Status: COMPLETED | OUTPATIENT
Start: 2024-10-24 | End: 2024-10-24

## 2024-10-24 RX ORDER — LOPERAMIDE HYDROCHLORIDE 2 MG/1
2 CAPSULE ORAL 4 TIMES DAILY PRN
Status: DISCONTINUED | OUTPATIENT
Start: 2024-10-24 | End: 2024-10-24 | Stop reason: HOSPADM

## 2024-10-24 RX ORDER — PROMETHAZINE HYDROCHLORIDE 12.5 MG/1
12.5-25 TABLET ORAL EVERY 4 HOURS PRN
Qty: 30 TABLET | Refills: 0 | Status: SHIPPED | OUTPATIENT
Start: 2024-10-24

## 2024-10-24 RX ORDER — INSULIN GLARGINE 100 [IU]/ML
20 INJECTION, SOLUTION SUBCUTANEOUS EVERY EVENING
Qty: 15 ML | Refills: 3 | Status: ACTIVE | OUTPATIENT
Start: 2024-10-24

## 2024-10-24 RX ORDER — OXYCODONE HYDROCHLORIDE 5 MG/1
5 TABLET ORAL EVERY 4 HOURS PRN
Qty: 12 TABLET | Refills: 0 | Status: SHIPPED | OUTPATIENT
Start: 2024-10-24 | End: 2024-10-27

## 2024-10-24 RX ORDER — REGADENOSON 0.08 MG/ML
INJECTION, SOLUTION INTRAVENOUS
Status: COMPLETED
Start: 2024-10-24 | End: 2024-10-24

## 2024-10-24 RX ADMIN — SENNOSIDES AND DOCUSATE SODIUM 2 TABLET: 50; 8.6 TABLET ORAL at 05:47

## 2024-10-24 RX ADMIN — INSULIN LISPRO 3 UNITS: 100 INJECTION, SOLUTION INTRAVENOUS; SUBCUTANEOUS at 12:29

## 2024-10-24 RX ADMIN — KETOROLAC TROMETHAMINE 15 MG: 15 INJECTION, SOLUTION INTRAMUSCULAR; INTRAVENOUS at 04:03

## 2024-10-24 RX ADMIN — KETOROLAC TROMETHAMINE 15 MG: 15 INJECTION, SOLUTION INTRAMUSCULAR; INTRAVENOUS at 12:21

## 2024-10-24 RX ADMIN — LOPERAMIDE HYDROCHLORIDE 2 MG: 2 CAPSULE ORAL at 14:22

## 2024-10-24 RX ADMIN — GABAPENTIN 200 MG: 100 CAPSULE ORAL at 12:21

## 2024-10-24 RX ADMIN — INSULIN LISPRO 2 UNITS: 100 INJECTION, SOLUTION INTRAVENOUS; SUBCUTANEOUS at 12:29

## 2024-10-24 RX ADMIN — GABAPENTIN 200 MG: 100 CAPSULE ORAL at 05:47

## 2024-10-24 RX ADMIN — ASPIRIN 81 MG: 81 TABLET, COATED ORAL at 05:47

## 2024-10-24 RX ADMIN — REGADENOSON 0.4 MG: 0.08 INJECTION, SOLUTION INTRAVENOUS at 10:43

## 2024-10-24 RX ADMIN — OMEPRAZOLE 20 MG: 20 CAPSULE, DELAYED RELEASE ORAL at 05:47

## 2024-10-24 RX ADMIN — OXYCODONE 5 MG: 5 TABLET ORAL at 02:29

## 2024-10-24 RX ADMIN — MAGNESIUM SULFATE HEPTAHYDRATE 4 G: 4 INJECTION, SOLUTION INTRAVENOUS at 08:47

## 2024-10-24 ASSESSMENT — PAIN DESCRIPTION - PAIN TYPE
TYPE: ACUTE PAIN

## 2024-10-24 ASSESSMENT — COGNITIVE AND FUNCTIONAL STATUS - GENERAL
DRESSING REGULAR LOWER BODY CLOTHING: A LITTLE
HELP NEEDED FOR BATHING: A LITTLE
DRESSING REGULAR UPPER BODY CLOTHING: A LITTLE
SUGGESTED CMS G CODE MODIFIER DAILY ACTIVITY: CK
TOILETING: A LITTLE
DAILY ACTIVITIY SCORE: 19
PERSONAL GROOMING: A LITTLE

## 2024-10-24 NOTE — TELEPHONE ENCOUNTER
LVM for [facility] to return my call to schedule patient for hospital follow up  
- continue clonazepam

## 2024-12-05 ENCOUNTER — APPOINTMENT (OUTPATIENT)
Dept: RADIOLOGY | Facility: MEDICAL CENTER | Age: 59
DRG: 380 | End: 2024-12-05
Attending: EMERGENCY MEDICINE
Payer: COMMERCIAL

## 2024-12-05 ENCOUNTER — HOSPITAL ENCOUNTER (EMERGENCY)
Facility: MEDICAL CENTER | Age: 59
DRG: 380 | End: 2024-12-05
Attending: EMERGENCY MEDICINE
Payer: COMMERCIAL

## 2024-12-05 VITALS
DIASTOLIC BLOOD PRESSURE: 58 MMHG | OXYGEN SATURATION: 93 % | SYSTOLIC BLOOD PRESSURE: 105 MMHG | BODY MASS INDEX: 21.67 KG/M2 | TEMPERATURE: 97.4 F | HEART RATE: 101 BPM | HEIGHT: 72 IN | RESPIRATION RATE: 19 BRPM | WEIGHT: 160 LBS

## 2024-12-05 DIAGNOSIS — G89.29 CHRONIC ABDOMINAL PAIN: ICD-10-CM

## 2024-12-05 DIAGNOSIS — Z91.148 NONCOMPLIANCE WITH MEDICATION REGIMEN: ICD-10-CM

## 2024-12-05 DIAGNOSIS — K20.90 CHRONIC ESOPHAGITIS: ICD-10-CM

## 2024-12-05 DIAGNOSIS — R73.9 HYPERGLYCEMIA: ICD-10-CM

## 2024-12-05 DIAGNOSIS — R10.9 CHRONIC ABDOMINAL PAIN: ICD-10-CM

## 2024-12-05 LAB
ALBUMIN SERPL BCP-MCNC: 3.6 G/DL (ref 3.2–4.9)
ALBUMIN/GLOB SERPL: 1.2 G/DL
ALP SERPL-CCNC: 95 U/L (ref 30–99)
ALT SERPL-CCNC: 6 U/L (ref 2–50)
ANION GAP SERPL CALC-SCNC: 18 MMOL/L (ref 7–16)
AST SERPL-CCNC: 11 U/L (ref 12–45)
BASOPHILS # BLD AUTO: 0.1 % (ref 0–1.8)
BASOPHILS # BLD: 0.01 K/UL (ref 0–0.12)
BILIRUB SERPL-MCNC: 0.5 MG/DL (ref 0.1–1.5)
BUN SERPL-MCNC: 21 MG/DL (ref 8–22)
CALCIUM ALBUM COR SERPL-MCNC: 9.2 MG/DL (ref 8.5–10.5)
CALCIUM SERPL-MCNC: 8.9 MG/DL (ref 8.5–10.5)
CHLORIDE SERPL-SCNC: 91 MMOL/L (ref 96–112)
CO2 SERPL-SCNC: 23 MMOL/L (ref 20–33)
CREAT SERPL-MCNC: 0.83 MG/DL (ref 0.5–1.4)
EOSINOPHIL # BLD AUTO: 0.01 K/UL (ref 0–0.51)
EOSINOPHIL NFR BLD: 0.1 % (ref 0–6.9)
ERYTHROCYTE [DISTWIDTH] IN BLOOD BY AUTOMATED COUNT: 38.3 FL (ref 35.9–50)
GFR SERPLBLD CREATININE-BSD FMLA CKD-EPI: 100 ML/MIN/1.73 M 2
GLOBULIN SER CALC-MCNC: 3.1 G/DL (ref 1.9–3.5)
GLUCOSE BLD STRIP.AUTO-MCNC: 378 MG/DL (ref 65–99)
GLUCOSE BLD STRIP.AUTO-MCNC: 412 MG/DL (ref 65–99)
GLUCOSE BLD STRIP.AUTO-MCNC: 425 MG/DL (ref 65–99)
GLUCOSE BLD STRIP.AUTO-MCNC: 431 MG/DL (ref 65–99)
GLUCOSE SERPL-MCNC: 450 MG/DL (ref 65–99)
HCT VFR BLD AUTO: 37 % (ref 42–52)
HGB BLD-MCNC: 12.2 G/DL (ref 14–18)
IMM GRANULOCYTES # BLD AUTO: 0.02 K/UL (ref 0–0.11)
IMM GRANULOCYTES NFR BLD AUTO: 0.2 % (ref 0–0.9)
LIPASE SERPL-CCNC: 11 U/L (ref 11–82)
LYMPHOCYTES # BLD AUTO: 0.74 K/UL (ref 1–4.8)
LYMPHOCYTES NFR BLD: 6.6 % (ref 22–41)
MCH RBC QN AUTO: 26.3 PG (ref 27–33)
MCHC RBC AUTO-ENTMCNC: 33 G/DL (ref 32.3–36.5)
MCV RBC AUTO: 79.7 FL (ref 81.4–97.8)
MONOCYTES # BLD AUTO: 0.38 K/UL (ref 0–0.85)
MONOCYTES NFR BLD AUTO: 3.4 % (ref 0–13.4)
NEUTROPHILS # BLD AUTO: 10.07 K/UL (ref 1.82–7.42)
NEUTROPHILS NFR BLD: 89.6 % (ref 44–72)
NRBC # BLD AUTO: 0 K/UL
NRBC BLD-RTO: 0 /100 WBC (ref 0–0.2)
PLATELET # BLD AUTO: 252 K/UL (ref 164–446)
PMV BLD AUTO: 9.2 FL (ref 9–12.9)
POTASSIUM SERPL-SCNC: 4.2 MMOL/L (ref 3.6–5.5)
PROT SERPL-MCNC: 6.7 G/DL (ref 6–8.2)
RBC # BLD AUTO: 4.64 M/UL (ref 4.7–6.1)
SODIUM SERPL-SCNC: 132 MMOL/L (ref 135–145)
WBC # BLD AUTO: 11.2 K/UL (ref 4.8–10.8)

## 2024-12-05 PROCEDURE — 700111 HCHG RX REV CODE 636 W/ 250 OVERRIDE (IP): Mod: JZ | Performed by: EMERGENCY MEDICINE

## 2024-12-05 PROCEDURE — 96375 TX/PRO/DX INJ NEW DRUG ADDON: CPT

## 2024-12-05 PROCEDURE — 700105 HCHG RX REV CODE 258: Performed by: EMERGENCY MEDICINE

## 2024-12-05 PROCEDURE — 74177 CT ABD & PELVIS W/CONTRAST: CPT

## 2024-12-05 PROCEDURE — 36415 COLL VENOUS BLD VENIPUNCTURE: CPT

## 2024-12-05 PROCEDURE — 700117 HCHG RX CONTRAST REV CODE 255: Performed by: EMERGENCY MEDICINE

## 2024-12-05 PROCEDURE — 80053 COMPREHEN METABOLIC PANEL: CPT

## 2024-12-05 PROCEDURE — 700111 HCHG RX REV CODE 636 W/ 250 OVERRIDE (IP): Performed by: EMERGENCY MEDICINE

## 2024-12-05 PROCEDURE — A9270 NON-COVERED ITEM OR SERVICE: HCPCS | Performed by: EMERGENCY MEDICINE

## 2024-12-05 PROCEDURE — 99285 EMERGENCY DEPT VISIT HI MDM: CPT

## 2024-12-05 PROCEDURE — 82962 GLUCOSE BLOOD TEST: CPT | Mod: 91

## 2024-12-05 PROCEDURE — 96374 THER/PROPH/DIAG INJ IV PUSH: CPT

## 2024-12-05 PROCEDURE — 700102 HCHG RX REV CODE 250 W/ 637 OVERRIDE(OP): Performed by: EMERGENCY MEDICINE

## 2024-12-05 PROCEDURE — 96376 TX/PRO/DX INJ SAME DRUG ADON: CPT

## 2024-12-05 PROCEDURE — 85025 COMPLETE CBC W/AUTO DIFF WBC: CPT

## 2024-12-05 PROCEDURE — 83690 ASSAY OF LIPASE: CPT

## 2024-12-05 RX ORDER — MORPHINE SULFATE 4 MG/ML
4 INJECTION INTRAVENOUS ONCE
Status: COMPLETED | OUTPATIENT
Start: 2024-12-05 | End: 2024-12-05

## 2024-12-05 RX ORDER — SODIUM CHLORIDE 9 MG/ML
1000 INJECTION, SOLUTION INTRAVENOUS ONCE
Status: COMPLETED | OUTPATIENT
Start: 2024-12-05 | End: 2024-12-05

## 2024-12-05 RX ORDER — ONDANSETRON 2 MG/ML
4 INJECTION INTRAMUSCULAR; INTRAVENOUS ONCE
Status: COMPLETED | OUTPATIENT
Start: 2024-12-05 | End: 2024-12-05

## 2024-12-05 RX ADMIN — IOHEXOL 100 ML: 350 INJECTION, SOLUTION INTRAVENOUS at 15:00

## 2024-12-05 RX ADMIN — INSULIN HUMAN 5 UNITS: 1 INJECTION, SOLUTION INTRAVENOUS at 16:32

## 2024-12-05 RX ADMIN — MORPHINE SULFATE 4 MG: 4 INJECTION, SOLUTION INTRAMUSCULAR; INTRAVENOUS at 08:56

## 2024-12-05 RX ADMIN — ONDANSETRON 4 MG: 2 INJECTION INTRAMUSCULAR; INTRAVENOUS at 08:55

## 2024-12-05 RX ADMIN — MORPHINE SULFATE 4 MG: 4 INJECTION INTRAVENOUS at 15:13

## 2024-12-05 RX ADMIN — LIDOCAINE HYDROCHLORIDE 30 ML: 20 SOLUTION ORAL; TOPICAL at 08:58

## 2024-12-05 RX ADMIN — SODIUM CHLORIDE 1000 ML: 9 INJECTION, SOLUTION INTRAVENOUS at 08:59

## 2024-12-05 RX ADMIN — MORPHINE SULFATE 4 MG: 4 INJECTION INTRAVENOUS at 10:40

## 2024-12-05 ASSESSMENT — PAIN DESCRIPTION - PAIN TYPE
TYPE: ACUTE PAIN

## 2024-12-05 ASSESSMENT — FIBROSIS 4 INDEX: FIB4 SCORE: 1.51

## 2024-12-05 NOTE — ED NOTES
Rounded on pt. Pt asleep in [facility] with even rise and fall of chest visible.  Call light within reach.

## 2024-12-05 NOTE — ED NOTES
Fluid bolus completed. Pt's blood sugar rechecked with result of 431.  ERP updated.  Pt medicated per MAR.

## 2024-12-05 NOTE — ED TRIAGE NOTES
Chief Complaint   Patient presents with    Abdominal Pain    N/V       Pt to triage [facility] from home for above complaint. Presents with abdominal pain and n/v started 2 days ago. H/O DM type 1; no insulin for 2 days.    Received 4 mg Zofran from EMS.    Protocol ordered    Pt back to lobby, educated on triage process and encourage to alert staff of any changes.     Vitals:    12/05/24 0644   BP: (!) 145/85   Pulse: (!) 109   Resp: 16   Temp: 36.3 °C (97.4 °F)   SpO2: 93%

## 2024-12-05 NOTE — ED NOTES
Rounded on pt. Pt requesting water.  Okay per ERP so pt provided with cup of water. No other needs at this time. Call light within reach.

## 2024-12-05 NOTE — ED NOTES
Pt yelling out of his room that he is in pain.  ERP has just seen pt. Pt educated that ERP is placing orders now and that pain medication will be brought as soon as the order is placed.  Pt continues to yell after education provided.

## 2024-12-05 NOTE — ED PROVIDER NOTES
"ED Provider Note    CHIEF COMPLAINT  Chief Complaint   Patient presents with    Abdominal Pain    N/V       EXTERNAL RECORDS REVIEWED    Patient was admitted to the hospital October 18, discharged on the 24th.  He was here for right shoulder pain and contusion of the right elbow was also noted to have hyperglycemia.  His musculoskeletal pain was related to a trip and fall.    Patient underwent CT scanning of the abdomen and pelvis October 24 of this year.  It revealed congenital horseshoe kidney, nonobstructing small stones, no hydronephrosis.        HPI/ROS  LIMITATION TO HISTORY   Select: : None  OUTSIDE HISTORIAN(S):  EMS run record noted    Christoph Taylor is a 59 y.o. male who presents to the emergency department with a chief complaint of left upper quadrant abdominal pain.  Symptoms started during the night last night.  He denies having a fever.  He has had similar symptoms before and was diagnosed with esophagitis.  He knows of no specific triggers for this.  He has not taken his insulin yesterday and today.  He states he has a home health nurse coming in, \"as soon as he can call her to make arrangements\" to help sort out his insulin as he reports it has been recent changes in the dosage.  He denies running out of his medication.  He denies alcohol use.  Pain is worsened with nausea and vomiting which she has had a few episodes of.  He denies diarrhea.  No urinary complaints.  No prior abdominal surgeries.  No history of hypertension.  He does have a known history of diabetes.    PAST MEDICAL HISTORY   has a past medical history of Abnormal electrocardiogram (ECG) (EKG) (11/08/2021), Acute cystitis without hematuria (01/26/2024), Acute esophagitis (07/28/2022), Acute hypoxic respiratory failure (HCC) (07/22/2024), Acute metabolic encephalopathy (06/09/2023), Acute on chronic anemia (05/06/2024), Acute on chronic respiratory failure (HCC) (05/20/2024), Acute respiratory failure with hypoxia (HCC) " (07/25/2024), Acute superficial gastritis without hemorrhage (05/21/2024), Agitation (06/09/2024), RKISTAL (acute kidney injury) (HCC) (11/08/2021), AP (abdominal pain) (06/08/2023), Chest pain in adult (11/08/2021), Chronic renal insufficiency (06/04/2024), CKD (chronic kidney disease) stage 3, GFR 30-59 ml/min (11/07/2021), Diabetes ([facility]), Diabetic ketoacidosis without coma ([facility]) (11/07/2021), Diabetic ketoacidosis without coma associated with type 2 diabetes mellitus ([facility]) (11/07/2021), Diarrhea (03/18/2022), DKA, type 1, not at goal ([facility]) (07/28/2022), Elevated troponin (06/10/2023), Esophagitis (07/28/2022), [place] (hard of hearing), Hypercholesteremia, Impaction of intestine ([facility]) (05/21/2024), Intractable left upper quadrant abdominal pain (05/21/2024), Leukocytosis (05/20/2024), Metabolic acidosis, increased anion gap (11/07/2021), Syncope (06/09/2023), and Transaminitis (06/12/2024).    SURGICAL HISTORY   has a past surgical history that includes other; upper gi endoscopy,diagnosis (N/A, 3/14/2023); finger or hand incision and drainage (Right, 6/12/2023); cervical disk and fusion anterior (N/A, 6/4/2024); fusion, spine, lumbar, plif (N/A, 6/7/2024); and lumbar laminectomy diskectomy (N/A, 6/7/2024).    FAMILY HISTORY  Family History   Problem Relation Age of Onset    Heart Disease Father        SOCIAL HISTORY  Social History     Tobacco Use    Smoking status: Never    Smokeless tobacco: Never   Vaping Use    Vaping status: Not on file   Substance and Sexual Activity    Alcohol use: Not Currently    Drug use: Not Currently    Sexual activity: Not on file       CURRENT MEDICATIONS  Home Medications       Reviewed by Oliva Adames R.N. (Registered Nurse) on 12/05/24 at 0692  Med List Status: Partial     Medication Last Dose Status   acetaminophen (TYLENOL) 325 MG Tab  Active   Alcohol Swabs  Active   aspirin 81 MG EC tablet  Active   Blood Glucose Monitoring Suppl (BLOOD GLUCOSE MONITOR SYSTEM) w/Device Kit   Active   Continuous Glucose  (FREESTYLE MAITE 14 DAY READER) Device  Active   Continuous Glucose Sensor (FREESTYLE MAITE 14 DAY SENSOR) Misc  Active   cyclobenzaprine (FLEXERIL) 5 mg tablet  Active   gabapentin (NEURONTIN) 100 MG Cap  Active   glucose blood (ONETOUCH ULTRA) strip  Active   ibuprofen (MOTRIN) 600 MG Tab  Active   insulin aspart (NOVOLOG FLEXPEN) 100 UNIT/ML injection PEN  Active   insulin glargine (LANTUS SOLOSTAR) 100 UNIT/ML Solution Pen-injector injection  Active   Insulin Pen Needle 32 G x 4 mm  Active   Lancets  Active   loperamide (IMODIUM) 2 MG Cap  Active   omeprazole (PRILOSEC) 20 MG delayed-release capsule  Active   promethazine (PHENERGAN) 12.5 MG tablet  Active                  Audit from Redirected Encounters    **Home medications have not yet been reviewed for this encounter**         ALLERGIES  Allergies   Allergen Reactions    Ketamine Unspecified     aggressive     PHYSICAL EXAM  VITAL SIGNS: BP (!) 162/88   Pulse (!) 101   Temp 36.3 °C (97.4 °F) (Temporal)   Resp 20   Ht 1.829 m (6')   Wt 72.6 kg (160 lb)   SpO2 99%   BMI 21.70 kg/m²    Vitals reviewed.   Constitutional: Patient is oriented to person, place, and time. Appears well-developed and well-nourished. Moderate distress.    Head: Normocephalic and atraumatic.   Ears: Normal external ears bilaterally.   Mouth/Throat: Oropharynx is clear with dry MM.  Eyes: Conjunctivae are normal.  Neck: Normal range of motion. Neck supple.  Cardiovascular: Tachycardia, regular rhythm and normal heart sounds.   Pulmonary/Chest: Effort normal and breath sounds normal. No respiratory distress, no wheezes, rhonchi, or rales. No chest wall tenderness.  Abdominal: Soft. Bowel sounds are normal. There is LUQ tenderness. No rebound or guarding, or peritoneal signs, no masses. No CVA tenderness.  Musculoskeletal: No edema and no tenderness.   Neurological: No cranial nerve deficits. No focal deficits.   Skin: Skin is warm and dry. No  erythema. No pallor.   Psychiatric: Patient has a normal mood and affect.     EKG/LABS  Results for orders placed or performed during the hospital encounter of 12/05/24   POCT glucose device results    Collection Time: 12/05/24  6:56 AM   Result Value Ref Range    POC Glucose, Blood 425 (HH) 65 - 99 mg/dL   CBC WITH DIFFERENTIAL    Collection Time: 12/05/24  7:00 AM   Result Value Ref Range    WBC 11.2 (H) 4.8 - 10.8 K/uL    RBC 4.64 (L) 4.70 - 6.10 M/uL    Hemoglobin 12.2 (L) 14.0 - 18.0 g/dL    Hematocrit 37.0 (L) 42.0 - 52.0 %    MCV 79.7 (L) 81.4 - 97.8 fL    MCH 26.3 (L) 27.0 - 33.0 pg    MCHC 33.0 32.3 - 36.5 g/dL    RDW 38.3 35.9 - 50.0 fL    Platelet Count 252 164 - 446 K/uL    MPV 9.2 9.0 - 12.9 fL    Neutrophils-Polys 89.60 (H) 44.00 - 72.00 %    Lymphocytes 6.60 (L) 22.00 - 41.00 %    Monocytes 3.40 0.00 - 13.40 %    Eosinophils 0.10 0.00 - 6.90 %    Basophils 0.10 0.00 - 1.80 %    Immature Granulocytes 0.20 0.00 - 0.90 %    Nucleated RBC 0.00 0.00 - 0.20 /100 WBC    Neutrophils (Absolute) 10.07 (H) 1.82 - 7.42 K/uL    Lymphs (Absolute) 0.74 (L) 1.00 - 4.80 K/uL    Monos (Absolute) 0.38 0.00 - 0.85 K/uL    Eos (Absolute) 0.01 0.00 - 0.51 K/uL    Baso (Absolute) 0.01 0.00 - 0.12 K/uL    Immature Granulocytes (abs) 0.02 0.00 - 0.11 K/uL    NRBC (Absolute) 0.00 K/uL   COMP METABOLIC PANEL    Collection Time: 12/05/24  7:00 AM   Result Value Ref Range    Sodium 132 (L) 135 - 145 mmol/L    Potassium 4.2 3.6 - 5.5 mmol/L    Chloride 91 (L) 96 - 112 mmol/L    Co2 23 20 - 33 mmol/L    Anion Gap 18.0 (H) 7.0 - 16.0    Glucose 450 (H) 65 - 99 mg/dL    Bun 21 8 - 22 mg/dL    Creatinine 0.83 0.50 - 1.40 mg/dL    Calcium 8.9 8.5 - 10.5 mg/dL    Correct Calcium 9.2 8.5 - 10.5 mg/dL    AST(SGOT) 11 (L) 12 - 45 U/L    ALT(SGPT) 6 2 - 50 U/L    Alkaline Phosphatase 95 30 - 99 U/L    Total Bilirubin 0.5 0.1 - 1.5 mg/dL    Albumin 3.6 3.2 - 4.9 g/dL    Total Protein 6.7 6.0 - 8.2 g/dL    Globulin 3.1 1.9 - 3.5 g/dL    A-G  Ratio 1.2 g/dL   LIPASE    Collection Time: 12/05/24  7:00 AM   Result Value Ref Range    Lipase 11 11 - 82 U/L   ESTIMATED GFR    Collection Time: 12/05/24  7:00 AM   Result Value Ref Range    GFR (CKD-EPI) 100 >60 mL/min/1.73 m 2   POCT glucose device results    Collection Time: 12/05/24 10:39 AM   Result Value Ref Range    POC Glucose, Blood 431 (HH) 65 - 99 mg/dL     I have independently interpreted this EKG    RADIOLOGY/PROCEDURES   I have independently interpreted the diagnostic imaging associated with this visit and am waiting the final reading from the radiologist.   My preliminary interpretation is as follows: no acute finding    Radiologist interpretation:  CT-ABDOMEN-PELVIS WITH   Final Result      1.  Possible bowel wall thickening in the distal esophagus. Correlate for esophagitis.   2.  Colonic diverticulosis without acute diverticulitis.   3.  Significantly distended urinary bladder   4.  Horseshoe kidney again noted. 5 mm nonobstructing left renal stone. No definite obstructive uropathy.          COURSE & MEDICAL DECISION MAKING    ASSESSMENT, COURSE AND PLAN  Care Narrative:     This is a 5-year-old male.  History of type 1 diabetes.  Has not taken his insulin for this being the second day.  Denies a fever.  History of esophagitis which has caused similar symptoms.  Blood shows elevated glucose of 450 with an anion gap of 18.  Bicarb is normal.  Potassium normal, sodium slightly low 132 though normalized with correction.  White blood cell count slightly elevated 11.2 within normal H&H.  Lipase is normal.  Blood sugars 425, he is tachycardic for this reason, he will be given IV fluids and treated with pain medication, antiemetics and a GI cocktail.    3:34 PM patient's reevaluated at the bedside.  As expected, based on not having taken his insulin, his blood sugars elevated.  He was treated with IV fluid resuscitation.  CT scanning shows chronic findings of esophagitis but otherwise appears  unchanged.  Patient is feeling better on reevaluation.  He is encouraged to call the home health nurse which she has yet to do but assures me that he will upon discharge.  Additionally, he is encouraged to start taking his insulin which he assures me that he has.  He is not complaining of any pain at this time.  He is also encouraged to follow-up with GI.  He is discharged home in stable condition.    Hydration: Based on the patient's presentation of Acute Vomiting, Hyperglycemia, and Tachycardia the patient was given IV fluids. IV Hydration was used because oral hydration was not adequate alone. Upon recheck following hydration, the patient was improved.    ADDITIONAL PROBLEMS MANAGED    DISPOSITION AND DISCUSSIONS  I have discussed management of the patient with the following physicians and MICHELLE's:  None    Discussion of management with other Hospitals in [place] or appropriate source(s): None     Escalation of care considered, and ultimately not performed:acute inpatient care management, however at this time, the patient is most appropriate for outpatient management    Barriers to care at this time, including but not limited to: None.     Decision tools and prescription drugs considered including, but not limited to: None    FINAL DIAGNOSIS  1. Chronic abdominal pain    2. Chronic esophagitis    3. Hyperglycemia    4. Noncompliance with medication regimen         Electronically signed by: Orin Schwarz D.O., 12/5/2024 8:47 AM

## 2024-12-06 ENCOUNTER — APPOINTMENT (OUTPATIENT)
Dept: RADIOLOGY | Facility: MEDICAL CENTER | Age: 59
DRG: 380 | End: 2024-12-06
Attending: EMERGENCY MEDICINE
Payer: COMMERCIAL

## 2024-12-06 ENCOUNTER — HOSPITAL ENCOUNTER (INPATIENT)
Facility: MEDICAL CENTER | Age: 59
LOS: 7 days | DRG: 380 | End: 2024-12-13
Attending: EMERGENCY MEDICINE | Admitting: INTERNAL MEDICINE
Payer: COMMERCIAL

## 2024-12-06 DIAGNOSIS — M48.061 SPINAL STENOSIS OF LUMBAR REGION, UNSPECIFIED WHETHER NEUROGENIC CLAUDICATION PRESENT: ICD-10-CM

## 2024-12-06 DIAGNOSIS — R73.9 HYPERGLYCEMIA: ICD-10-CM

## 2024-12-06 DIAGNOSIS — Z91.148 NON COMPLIANCE W MEDICATION REGIMEN: ICD-10-CM

## 2024-12-06 DIAGNOSIS — J18.9 COMMUNITY ACQUIRED PNEUMONIA, UNSPECIFIED LATERALITY: ICD-10-CM

## 2024-12-06 DIAGNOSIS — E11.65 UNCONTROLLED TYPE 2 DIABETES MELLITUS WITH HYPERGLYCEMIA (HCC): ICD-10-CM

## 2024-12-06 DIAGNOSIS — E11.10 DKA, TYPE 2, NOT AT GOAL (HCC): ICD-10-CM

## 2024-12-06 DIAGNOSIS — M48.02 CERVICAL STENOSIS OF SPINAL CANAL: ICD-10-CM

## 2024-12-06 DIAGNOSIS — R09.02 HYPOXIA: Primary | ICD-10-CM

## 2024-12-06 PROBLEM — J96.90 RESPIRATORY FAILURE (HCC): Status: ACTIVE | Noted: 2024-12-06

## 2024-12-06 LAB
ALBUMIN SERPL BCP-MCNC: 3.6 G/DL (ref 3.2–4.9)
ALBUMIN/GLOB SERPL: 1.3 G/DL
ALP SERPL-CCNC: 91 U/L (ref 30–99)
ALT SERPL-CCNC: <5 U/L (ref 2–50)
ANION GAP SERPL CALC-SCNC: 18 MMOL/L (ref 7–16)
AST SERPL-CCNC: 11 U/L (ref 12–45)
BASOPHILS # BLD AUTO: 0.1 % (ref 0–1.8)
BASOPHILS # BLD: 0.02 K/UL (ref 0–0.12)
BILIRUB SERPL-MCNC: 0.4 MG/DL (ref 0.1–1.5)
BUN SERPL-MCNC: 31 MG/DL (ref 8–22)
CALCIUM ALBUM COR SERPL-MCNC: 8.9 MG/DL (ref 8.5–10.5)
CALCIUM SERPL-MCNC: 8.6 MG/DL (ref 8.5–10.5)
CHLORIDE SERPL-SCNC: 92 MMOL/L (ref 96–112)
CK SERPL-CCNC: 37 U/L (ref 0–154)
CO2 SERPL-SCNC: 22 MMOL/L (ref 20–33)
CREAT SERPL-MCNC: 1.12 MG/DL (ref 0.5–1.4)
CRP SERPL HS-MCNC: 1.01 MG/DL (ref 0–0.75)
EKG IMPRESSION: NORMAL
EOSINOPHIL # BLD AUTO: 0.01 K/UL (ref 0–0.51)
EOSINOPHIL NFR BLD: 0.1 % (ref 0–6.9)
ERYTHROCYTE [DISTWIDTH] IN BLOOD BY AUTOMATED COUNT: 40 FL (ref 35.9–50)
ETHANOL BLD-MCNC: <10.1 MG/DL
FLUAV RNA SPEC QL NAA+PROBE: NEGATIVE
FLUBV RNA SPEC QL NAA+PROBE: NEGATIVE
GFR SERPLBLD CREATININE-BSD FMLA CKD-EPI: 75 ML/MIN/1.73 M 2
GLOBULIN SER CALC-MCNC: 2.8 G/DL (ref 1.9–3.5)
GLUCOSE BLD STRIP.AUTO-MCNC: 390 MG/DL (ref 65–99)
GLUCOSE SERPL-MCNC: 459 MG/DL (ref 65–99)
HCT VFR BLD AUTO: 33.9 % (ref 42–52)
HGB BLD-MCNC: 11.1 G/DL (ref 14–18)
IMM GRANULOCYTES # BLD AUTO: 0.08 K/UL (ref 0–0.11)
IMM GRANULOCYTES NFR BLD AUTO: 0.6 % (ref 0–0.9)
LIPASE SERPL-CCNC: 12 U/L (ref 11–82)
LYMPHOCYTES # BLD AUTO: 0.96 K/UL (ref 1–4.8)
LYMPHOCYTES NFR BLD: 6.8 % (ref 22–41)
MAGNESIUM SERPL-MCNC: 2 MG/DL (ref 1.5–2.5)
MCH RBC QN AUTO: 26.6 PG (ref 27–33)
MCHC RBC AUTO-ENTMCNC: 32.7 G/DL (ref 32.3–36.5)
MCV RBC AUTO: 81.1 FL (ref 81.4–97.8)
MONOCYTES # BLD AUTO: 0.73 K/UL (ref 0–0.85)
MONOCYTES NFR BLD AUTO: 5.1 % (ref 0–13.4)
NEUTROPHILS # BLD AUTO: 12.41 K/UL (ref 1.82–7.42)
NEUTROPHILS NFR BLD: 87.3 % (ref 44–72)
NRBC # BLD AUTO: 0 K/UL
NRBC BLD-RTO: 0 /100 WBC (ref 0–0.2)
NT-PROBNP SERPL IA-MCNC: 322 PG/ML (ref 0–125)
PHOSPHATE SERPL-MCNC: 3.2 MG/DL (ref 2.5–4.5)
PLATELET # BLD AUTO: 273 K/UL (ref 164–446)
PMV BLD AUTO: 9.4 FL (ref 9–12.9)
POTASSIUM SERPL-SCNC: 4.6 MMOL/L (ref 3.6–5.5)
PROCALCITONIN SERPL-MCNC: 0.07 NG/ML
PROT SERPL-MCNC: 6.4 G/DL (ref 6–8.2)
RBC # BLD AUTO: 4.18 M/UL (ref 4.7–6.1)
RSV RNA SPEC QL NAA+PROBE: NEGATIVE
SARS-COV-2 RNA RESP QL NAA+PROBE: NOTDETECTED
SODIUM SERPL-SCNC: 132 MMOL/L (ref 135–145)
TSH SERPL DL<=0.005 MIU/L-ACNC: 1.28 UIU/ML (ref 0.38–5.33)
WBC # BLD AUTO: 14.2 K/UL (ref 4.8–10.8)

## 2024-12-06 PROCEDURE — 36415 COLL VENOUS BLD VENIPUNCTURE: CPT

## 2024-12-06 PROCEDURE — 84443 ASSAY THYROID STIM HORMONE: CPT

## 2024-12-06 PROCEDURE — 99223 1ST HOSP IP/OBS HIGH 75: CPT | Performed by: INTERNAL MEDICINE

## 2024-12-06 PROCEDURE — A9270 NON-COVERED ITEM OR SERVICE: HCPCS | Performed by: INTERNAL MEDICINE

## 2024-12-06 PROCEDURE — 85025 COMPLETE CBC W/AUTO DIFF WBC: CPT

## 2024-12-06 PROCEDURE — 770020 HCHG ROOM/CARE - TELE (206)

## 2024-12-06 PROCEDURE — 700111 HCHG RX REV CODE 636 W/ 250 OVERRIDE (IP): Mod: JZ | Performed by: EMERGENCY MEDICINE

## 2024-12-06 PROCEDURE — 0241U HCHG SARS-COV-2 COVID-19 NFCT DS RESP RNA 4 TRGT ED POC: CPT

## 2024-12-06 PROCEDURE — 303105 HCHG CATHETER EXTRA

## 2024-12-06 PROCEDURE — 700111 HCHG RX REV CODE 636 W/ 250 OVERRIDE (IP): Mod: JZ | Performed by: INTERNAL MEDICINE

## 2024-12-06 PROCEDURE — 51798 US URINE CAPACITY MEASURE: CPT

## 2024-12-06 PROCEDURE — 96375 TX/PRO/DX INJ NEW DRUG ADDON: CPT

## 2024-12-06 PROCEDURE — 82550 ASSAY OF CK (CPK): CPT

## 2024-12-06 PROCEDURE — 82962 GLUCOSE BLOOD TEST: CPT

## 2024-12-06 PROCEDURE — 86140 C-REACTIVE PROTEIN: CPT

## 2024-12-06 PROCEDURE — 71045 X-RAY EXAM CHEST 1 VIEW: CPT

## 2024-12-06 PROCEDURE — 93005 ELECTROCARDIOGRAM TRACING: CPT | Mod: TC | Performed by: INTERNAL MEDICINE

## 2024-12-06 PROCEDURE — 83735 ASSAY OF MAGNESIUM: CPT

## 2024-12-06 PROCEDURE — 80053 COMPREHEN METABOLIC PANEL: CPT

## 2024-12-06 PROCEDURE — 82077 ASSAY SPEC XCP UR&BREATH IA: CPT

## 2024-12-06 PROCEDURE — 51702 INSERT TEMP BLADDER CATH: CPT

## 2024-12-06 PROCEDURE — 84100 ASSAY OF PHOSPHORUS: CPT

## 2024-12-06 PROCEDURE — 83690 ASSAY OF LIPASE: CPT

## 2024-12-06 PROCEDURE — 96374 THER/PROPH/DIAG INJ IV PUSH: CPT

## 2024-12-06 PROCEDURE — 99285 EMERGENCY DEPT VISIT HI MDM: CPT

## 2024-12-06 PROCEDURE — 84145 PROCALCITONIN (PCT): CPT

## 2024-12-06 PROCEDURE — 700102 HCHG RX REV CODE 250 W/ 637 OVERRIDE(OP): Performed by: INTERNAL MEDICINE

## 2024-12-06 PROCEDURE — 83880 ASSAY OF NATRIURETIC PEPTIDE: CPT

## 2024-12-06 RX ORDER — POLYETHYLENE GLYCOL 3350 17 G/17G
1 POWDER, FOR SOLUTION ORAL
Status: DISCONTINUED | OUTPATIENT
Start: 2024-12-06 | End: 2024-12-13 | Stop reason: HOSPADM

## 2024-12-06 RX ORDER — SODIUM CHLORIDE, SODIUM LACTATE, POTASSIUM CHLORIDE, CALCIUM CHLORIDE 600; 310; 30; 20 MG/100ML; MG/100ML; MG/100ML; MG/100ML
1000 INJECTION, SOLUTION INTRAVENOUS ONCE
Status: DISCONTINUED | OUTPATIENT
Start: 2024-12-06 | End: 2024-12-06

## 2024-12-06 RX ORDER — ASPIRIN 81 MG/1
81 TABLET ORAL DAILY
Status: DISCONTINUED | OUTPATIENT
Start: 2024-12-07 | End: 2024-12-13 | Stop reason: HOSPADM

## 2024-12-06 RX ORDER — PROMETHAZINE HYDROCHLORIDE 25 MG/1
12.5-25 TABLET ORAL EVERY 4 HOURS PRN
Status: DISCONTINUED | OUTPATIENT
Start: 2024-12-06 | End: 2024-12-13 | Stop reason: HOSPADM

## 2024-12-06 RX ORDER — AZITHROMYCIN 500 MG/5ML
500 INJECTION, POWDER, LYOPHILIZED, FOR SOLUTION INTRAVENOUS ONCE
Status: DISCONTINUED | OUTPATIENT
Start: 2024-12-06 | End: 2024-12-06

## 2024-12-06 RX ORDER — CYCLOBENZAPRINE HCL 10 MG
5 TABLET ORAL 3 TIMES DAILY PRN
Status: DISCONTINUED | OUTPATIENT
Start: 2024-12-06 | End: 2024-12-13 | Stop reason: HOSPADM

## 2024-12-06 RX ORDER — CEFTRIAXONE 2 G/1
2000 INJECTION, POWDER, FOR SOLUTION INTRAMUSCULAR; INTRAVENOUS ONCE
Status: COMPLETED | OUTPATIENT
Start: 2024-12-06 | End: 2024-12-06

## 2024-12-06 RX ORDER — SODIUM CHLORIDE 9 MG/ML
INJECTION, SOLUTION INTRAVENOUS CONTINUOUS
Status: DISCONTINUED | OUTPATIENT
Start: 2024-12-06 | End: 2024-12-07

## 2024-12-06 RX ORDER — PROMETHAZINE HYDROCHLORIDE 25 MG/1
12.5-25 SUPPOSITORY RECTAL EVERY 4 HOURS PRN
Status: DISCONTINUED | OUTPATIENT
Start: 2024-12-06 | End: 2024-12-13 | Stop reason: HOSPADM

## 2024-12-06 RX ORDER — OXYCODONE HYDROCHLORIDE 5 MG/1
2.5 TABLET ORAL
Status: DISCONTINUED | OUTPATIENT
Start: 2024-12-06 | End: 2024-12-08

## 2024-12-06 RX ORDER — HYDROMORPHONE HYDROCHLORIDE 1 MG/ML
0.25 INJECTION, SOLUTION INTRAMUSCULAR; INTRAVENOUS; SUBCUTANEOUS
Status: DISCONTINUED | OUTPATIENT
Start: 2024-12-06 | End: 2024-12-07

## 2024-12-06 RX ORDER — AZITHROMYCIN MONOHYDRATE 500 MG/5ML
500 INJECTION, POWDER, LYOPHILIZED, FOR SOLUTION INTRAVENOUS ONCE
Status: DISCONTINUED | OUTPATIENT
Start: 2024-12-06 | End: 2024-12-07

## 2024-12-06 RX ORDER — LOPERAMIDE HYDROCHLORIDE 2 MG/1
2 CAPSULE ORAL 4 TIMES DAILY PRN
Status: DISCONTINUED | OUTPATIENT
Start: 2024-12-06 | End: 2024-12-13 | Stop reason: HOSPADM

## 2024-12-06 RX ORDER — HYDRALAZINE HYDROCHLORIDE 20 MG/ML
10 INJECTION INTRAMUSCULAR; INTRAVENOUS EVERY 4 HOURS PRN
Status: DISCONTINUED | OUTPATIENT
Start: 2024-12-06 | End: 2024-12-13 | Stop reason: HOSPADM

## 2024-12-06 RX ORDER — OXYCODONE HYDROCHLORIDE 5 MG/1
5 TABLET ORAL
Status: DISCONTINUED | OUTPATIENT
Start: 2024-12-06 | End: 2024-12-08

## 2024-12-06 RX ORDER — ENOXAPARIN SODIUM 100 MG/ML
40 INJECTION SUBCUTANEOUS DAILY
Status: DISCONTINUED | OUTPATIENT
Start: 2024-12-07 | End: 2024-12-13 | Stop reason: HOSPADM

## 2024-12-06 RX ORDER — GUAIFENESIN/DEXTROMETHORPHAN 100-10MG/5
10 SYRUP ORAL EVERY 6 HOURS PRN
Status: DISCONTINUED | OUTPATIENT
Start: 2024-12-06 | End: 2024-12-13 | Stop reason: HOSPADM

## 2024-12-06 RX ORDER — ONDANSETRON 2 MG/ML
4 INJECTION INTRAMUSCULAR; INTRAVENOUS EVERY 4 HOURS PRN
Status: DISCONTINUED | OUTPATIENT
Start: 2024-12-06 | End: 2024-12-13 | Stop reason: HOSPADM

## 2024-12-06 RX ORDER — INSULIN LISPRO 100 [IU]/ML
3-14 INJECTION, SOLUTION INTRAVENOUS; SUBCUTANEOUS
Status: DISCONTINUED | OUTPATIENT
Start: 2024-12-06 | End: 2024-12-13 | Stop reason: HOSPADM

## 2024-12-06 RX ORDER — ACETAMINOPHEN 10 MG/ML
1000 INJECTION, SOLUTION INTRAVENOUS ONCE
Status: COMPLETED | OUTPATIENT
Start: 2024-12-06 | End: 2024-12-06

## 2024-12-06 RX ORDER — AMOXICILLIN 250 MG
2 CAPSULE ORAL EVERY EVENING
Status: DISCONTINUED | OUTPATIENT
Start: 2024-12-06 | End: 2024-12-13 | Stop reason: HOSPADM

## 2024-12-06 RX ORDER — ACETAMINOPHEN 325 MG/1
650 TABLET ORAL EVERY 6 HOURS PRN
Status: DISCONTINUED | OUTPATIENT
Start: 2024-12-06 | End: 2024-12-13 | Stop reason: HOSPADM

## 2024-12-06 RX ORDER — ONDANSETRON 4 MG/1
4 TABLET, ORALLY DISINTEGRATING ORAL EVERY 4 HOURS PRN
Status: DISCONTINUED | OUTPATIENT
Start: 2024-12-06 | End: 2024-12-13 | Stop reason: HOSPADM

## 2024-12-06 RX ORDER — GABAPENTIN 100 MG/1
100 CAPSULE ORAL 3 TIMES DAILY
Status: DISCONTINUED | OUTPATIENT
Start: 2024-12-06 | End: 2024-12-13 | Stop reason: HOSPADM

## 2024-12-06 RX ORDER — PROCHLORPERAZINE EDISYLATE 5 MG/ML
5-10 INJECTION INTRAMUSCULAR; INTRAVENOUS EVERY 4 HOURS PRN
Status: DISCONTINUED | OUTPATIENT
Start: 2024-12-06 | End: 2024-12-13 | Stop reason: HOSPADM

## 2024-12-06 RX ORDER — IBUPROFEN 600 MG/1
600 TABLET, FILM COATED ORAL EVERY 6 HOURS PRN
Status: DISCONTINUED | OUTPATIENT
Start: 2024-12-06 | End: 2024-12-08

## 2024-12-06 RX ORDER — DEXTROSE MONOHYDRATE 25 G/50ML
25 INJECTION, SOLUTION INTRAVENOUS
Status: DISCONTINUED | OUTPATIENT
Start: 2024-12-06 | End: 2024-12-13 | Stop reason: HOSPADM

## 2024-12-06 RX ADMIN — INSULIN LISPRO 12 UNITS: 100 INJECTION, SOLUTION INTRAVENOUS; SUBCUTANEOUS at 23:19

## 2024-12-06 RX ADMIN — FAMOTIDINE 20 MG: 10 INJECTION, SOLUTION INTRAVENOUS at 21:46

## 2024-12-06 RX ADMIN — OXYCODONE 5 MG: 5 TABLET ORAL at 21:40

## 2024-12-06 RX ADMIN — ACETAMINOPHEN 1000 MG: 1000 INJECTION INTRAVENOUS at 18:00

## 2024-12-06 RX ADMIN — INSULIN GLARGINE-YFGN 20 UNITS: 100 INJECTION, SOLUTION SUBCUTANEOUS at 23:19

## 2024-12-06 RX ADMIN — CEFTRIAXONE SODIUM 2000 MG: 2 INJECTION, POWDER, FOR SOLUTION INTRAMUSCULAR; INTRAVENOUS at 21:41

## 2024-12-06 ASSESSMENT — COGNITIVE AND FUNCTIONAL STATUS - GENERAL
TURNING FROM BACK TO SIDE WHILE IN FLAT BAD: A LITTLE
DRESSING REGULAR LOWER BODY CLOTHING: A LITTLE
HELP NEEDED FOR BATHING: A LITTLE
MOBILITY SCORE: 14
CLIMB 3 TO 5 STEPS WITH RAILING: A LOT
STANDING UP FROM CHAIR USING ARMS: A LOT
TOILETING: A LITTLE
WALKING IN HOSPITAL ROOM: A LOT
DRESSING REGULAR UPPER BODY CLOTHING: A LITTLE
DAILY ACTIVITIY SCORE: 19
PERSONAL GROOMING: A LITTLE
SUGGESTED CMS G CODE MODIFIER MOBILITY: CL
SUGGESTED CMS G CODE MODIFIER DAILY ACTIVITY: CK
MOVING FROM LYING ON BACK TO SITTING ON SIDE OF FLAT BED: A LITTLE
MOVING TO AND FROM BED TO CHAIR: A LOT

## 2024-12-06 ASSESSMENT — LIFESTYLE VARIABLES
TOTAL SCORE: 0
HAVE YOU EVER FELT YOU SHOULD CUT DOWN ON YOUR DRINKING: NO
ON A TYPICAL DAY WHEN YOU DRINK ALCOHOL HOW MANY DRINKS DO YOU HAVE: 0
EVER HAD A DRINK FIRST THING IN THE MORNING TO STEADY YOUR NERVES TO GET RID OF A HANGOVER: NO
HAVE PEOPLE ANNOYED YOU BY CRITICIZING YOUR DRINKING: NO
AVERAGE NUMBER OF DAYS PER WEEK YOU HAVE A DRINK CONTAINING ALCOHOL: 0
CONSUMPTION TOTAL: NEGATIVE
TOTAL SCORE: 0
ALCOHOL_USE: NO
TOTAL SCORE: 0
HOW MANY TIMES IN THE PAST YEAR HAVE YOU HAD 5 OR MORE DRINKS IN A DAY: 0
EVER FELT BAD OR GUILTY ABOUT YOUR DRINKING: NO

## 2024-12-06 ASSESSMENT — SOCIAL DETERMINANTS OF HEALTH (SDOH)
WITHIN THE PAST 12 MONTHS, YOU WORRIED THAT YOUR FOOD WOULD RUN OUT BEFORE YOU GOT THE MONEY TO BUY MORE: NEVER TRUE
WITHIN THE LAST YEAR, HAVE YOU BEEN AFRAID OF YOUR PARTNER OR EX-PARTNER?: NO
IN THE PAST 12 MONTHS, HAS THE ELECTRIC, GAS, OIL, OR WATER COMPANY THREATENED TO SHUT OFF SERVICE IN YOUR HOME?: NO
WITHIN THE LAST YEAR, HAVE YOU BEEN KICKED, HIT, SLAPPED, OR OTHERWISE PHYSICALLY HURT BY YOUR PARTNER OR EX-PARTNER?: NO
WITHIN THE LAST YEAR, HAVE YOU BEEN HUMILIATED OR EMOTIONALLY ABUSED IN OTHER WAYS BY YOUR PARTNER OR EX-PARTNER?: NO
WITHIN THE PAST 12 MONTHS, THE FOOD YOU BOUGHT JUST DIDN'T LAST AND YOU DIDN'T HAVE MONEY TO GET MORE: NEVER TRUE
WITHIN THE LAST YEAR, HAVE TO BEEN RAPED OR FORCED TO HAVE ANY KIND OF SEXUAL ACTIVITY BY YOUR PARTNER OR EX-PARTNER?: NO

## 2024-12-06 ASSESSMENT — FIBROSIS 4 INDEX
FIB4 SCORE: 1.14
FIB4 SCORE: 1.07
FIB4 SCORE: 1.14

## 2024-12-06 ASSESSMENT — PATIENT HEALTH QUESTIONNAIRE - PHQ9
SUM OF ALL RESPONSES TO PHQ9 QUESTIONS 1 AND 2: 6
SUM OF ALL RESPONSES TO PHQ QUESTIONS 1-9: 19
9. THOUGHTS THAT YOU WOULD BE BETTER OFF DEAD, OR OF HURTING YOURSELF: SEVERAL DAYS
6. FEELING BAD ABOUT YOURSELF - OR THAT YOU ARE A FAILURE OR HAVE LET YOURSELF OR YOUR FAMILY DOWN: MORE THAN HALF THE DAYS
4. FEELING TIRED OR HAVING LITTLE ENERGY: NEARLY EVERY DAY
7. TROUBLE CONCENTRATING ON THINGS, SUCH AS READING THE NEWSPAPER OR WATCHING TELEVISION: MORE THAN HALF THE DAYS
5. POOR APPETITE OR OVEREATING: MORE THAN HALF THE DAYS
3. TROUBLE FALLING OR STAYING ASLEEP OR SLEEPING TOO MUCH: NEARLY EVERY DAY
1. LITTLE INTEREST OR PLEASURE IN DOING THINGS: NEARLY EVERY DAY
2. FEELING DOWN, DEPRESSED, IRRITABLE, OR HOPELESS: NEARLY EVERY DAY
8. MOVING OR SPEAKING SO SLOWLY THAT OTHER PEOPLE COULD HAVE NOTICED. OR THE OPPOSITE, BEING SO FIGETY OR RESTLESS THAT YOU HAVE BEEN MOVING AROUND A LOT MORE THAN USUAL: NOT AT ALL

## 2024-12-06 ASSESSMENT — PAIN DESCRIPTION - PAIN TYPE: TYPE: ACUTE PAIN

## 2024-12-06 NOTE — ED NOTES
Pt has discharge orders. Pt educated on discharge instructions.  Pt verbalizes understanding.   PIVs removed. Pt wheeled to lobby with WC. Pt going home with taxi voucher from social work.

## 2024-12-06 NOTE — ED NOTES
Pt's blood sugar recheck completed with improvement in levels. Pt notified that discharge orders are in place.  Pt requesting to go home with pain medication.  Pt educated that per the ERP, narcotics will not be prescribed at this time.  Pt requesting a cab voucher. SW notified of request.

## 2024-12-07 ENCOUNTER — APPOINTMENT (OUTPATIENT)
Dept: RADIOLOGY | Facility: MEDICAL CENTER | Age: 59
DRG: 380 | End: 2024-12-07
Attending: EMERGENCY MEDICINE
Payer: COMMERCIAL

## 2024-12-07 PROBLEM — M54.50 CHRONIC MIDLINE LOW BACK PAIN: Status: ACTIVE | Noted: 2024-12-07

## 2024-12-07 PROBLEM — G89.29 CHRONIC MIDLINE LOW BACK PAIN: Status: ACTIVE | Noted: 2024-12-07

## 2024-12-07 PROBLEM — R45.851 SUICIDAL IDEATIONS: Status: ACTIVE | Noted: 2024-12-07

## 2024-12-07 PROBLEM — G47.30 SLEEP APNEA: Status: ACTIVE | Noted: 2024-12-07

## 2024-12-07 PROBLEM — J96.00 ACUTE RESPIRATORY FAILURE (HCC): Status: ACTIVE | Noted: 2024-12-06

## 2024-12-07 PROBLEM — G56.23 LESION OF ULNAR NERVE, BILATERAL UPPER LIMBS: Status: ACTIVE | Noted: 2024-12-07

## 2024-12-07 PROBLEM — G56.13: Status: ACTIVE | Noted: 2024-12-07

## 2024-12-07 PROBLEM — F32.9 MAJOR DEPRESSIVE DISORDER: Status: ACTIVE | Noted: 2024-12-07

## 2024-12-07 LAB
ALBUMIN SERPL BCP-MCNC: 3.4 G/DL (ref 3.2–4.9)
ALBUMIN/GLOB SERPL: 1.4 G/DL
ALP SERPL-CCNC: 102 U/L (ref 30–99)
ALT SERPL-CCNC: <5 U/L (ref 2–50)
AMPHET UR QL SCN: NEGATIVE
ANION GAP SERPL CALC-SCNC: 12 MMOL/L (ref 7–16)
APPEARANCE UR: CLEAR
AST SERPL-CCNC: 11 U/L (ref 12–45)
BACTERIA #/AREA URNS HPF: ABNORMAL /HPF
BARBITURATES UR QL SCN: NEGATIVE
BASOPHILS # BLD AUTO: 0.1 % (ref 0–1.8)
BASOPHILS # BLD: 0.01 K/UL (ref 0–0.12)
BENZODIAZ UR QL SCN: NEGATIVE
BILIRUB SERPL-MCNC: 0.2 MG/DL (ref 0.1–1.5)
BILIRUB UR QL STRIP.AUTO: NEGATIVE
BUN SERPL-MCNC: 25 MG/DL (ref 8–22)
BZE UR QL SCN: NEGATIVE
CALCIUM ALBUM COR SERPL-MCNC: 9.3 MG/DL (ref 8.5–10.5)
CALCIUM SERPL-MCNC: 8.8 MG/DL (ref 8.5–10.5)
CANNABINOIDS UR QL SCN: NEGATIVE
CASTS URNS QL MICRO: ABNORMAL /LPF (ref 0–2)
CHLORIDE SERPL-SCNC: 96 MMOL/L (ref 96–112)
CO2 SERPL-SCNC: 26 MMOL/L (ref 20–33)
COLOR UR: YELLOW
CREAT SERPL-MCNC: 0.96 MG/DL (ref 0.5–1.4)
EOSINOPHIL # BLD AUTO: 0.02 K/UL (ref 0–0.51)
EOSINOPHIL NFR BLD: 0.2 % (ref 0–6.9)
EPITHELIAL CELLS 1715: ABNORMAL /HPF (ref 0–5)
ERYTHROCYTE [DISTWIDTH] IN BLOOD BY AUTOMATED COUNT: 37.9 FL (ref 35.9–50)
FENTANYL UR QL: NEGATIVE
GFR SERPLBLD CREATININE-BSD FMLA CKD-EPI: 90 ML/MIN/1.73 M 2
GLOBULIN SER CALC-MCNC: 2.5 G/DL (ref 1.9–3.5)
GLUCOSE BLD STRIP.AUTO-MCNC: 138 MG/DL (ref 65–99)
GLUCOSE BLD STRIP.AUTO-MCNC: 139 MG/DL (ref 65–99)
GLUCOSE BLD STRIP.AUTO-MCNC: 148 MG/DL (ref 65–99)
GLUCOSE BLD STRIP.AUTO-MCNC: 199 MG/DL (ref 65–99)
GLUCOSE SERPL-MCNC: 203 MG/DL (ref 65–99)
GLUCOSE UR STRIP.AUTO-MCNC: >=1000 MG/DL
HCT VFR BLD AUTO: 33 % (ref 42–52)
HGB BLD-MCNC: 10.7 G/DL (ref 14–18)
IMM GRANULOCYTES # BLD AUTO: 0.04 K/UL (ref 0–0.11)
IMM GRANULOCYTES NFR BLD AUTO: 0.4 % (ref 0–0.9)
KETONES UR STRIP.AUTO-MCNC: 80 MG/DL
LEUKOCYTE ESTERASE UR QL STRIP.AUTO: NEGATIVE
LYMPHOCYTES # BLD AUTO: 1.19 K/UL (ref 1–4.8)
LYMPHOCYTES NFR BLD: 11.4 % (ref 22–41)
MCH RBC QN AUTO: 25.8 PG (ref 27–33)
MCHC RBC AUTO-ENTMCNC: 32.4 G/DL (ref 32.3–36.5)
MCV RBC AUTO: 79.5 FL (ref 81.4–97.8)
METHADONE UR QL SCN: NEGATIVE
MICRO URNS: ABNORMAL
MONOCYTES # BLD AUTO: 0.78 K/UL (ref 0–0.85)
MONOCYTES NFR BLD AUTO: 7.5 % (ref 0–13.4)
NEUTROPHILS # BLD AUTO: 8.38 K/UL (ref 1.82–7.42)
NEUTROPHILS NFR BLD: 80.4 % (ref 44–72)
NITRITE UR QL STRIP.AUTO: NEGATIVE
NRBC # BLD AUTO: 0 K/UL
NRBC BLD-RTO: 0 /100 WBC (ref 0–0.2)
OPIATES UR QL SCN: POSITIVE
OXYCODONE UR QL SCN: POSITIVE
PCP UR QL SCN: NEGATIVE
PH UR STRIP.AUTO: 5.5 [PH] (ref 5–8)
PLATELET # BLD AUTO: 251 K/UL (ref 164–446)
PMV BLD AUTO: 9.2 FL (ref 9–12.9)
POTASSIUM SERPL-SCNC: 4 MMOL/L (ref 3.6–5.5)
PROPOXYPH UR QL SCN: NEGATIVE
PROT SERPL-MCNC: 5.9 G/DL (ref 6–8.2)
PROT UR QL STRIP: 100 MG/DL
RBC # BLD AUTO: 4.15 M/UL (ref 4.7–6.1)
RBC # URNS HPF: ABNORMAL /HPF (ref 0–2)
RBC UR QL AUTO: ABNORMAL
SODIUM SERPL-SCNC: 134 MMOL/L (ref 135–145)
SP GR UR STRIP.AUTO: 1.03
UROBILINOGEN UR STRIP.AUTO-MCNC: 0.2 EU/DL
WBC # BLD AUTO: 10.4 K/UL (ref 4.8–10.8)
WBC #/AREA URNS HPF: ABNORMAL /HPF

## 2024-12-07 PROCEDURE — 80053 COMPREHEN METABOLIC PANEL: CPT

## 2024-12-07 PROCEDURE — 85025 COMPLETE CBC W/AUTO DIFF WBC: CPT

## 2024-12-07 PROCEDURE — A9270 NON-COVERED ITEM OR SERVICE: HCPCS | Performed by: INTERNAL MEDICINE

## 2024-12-07 PROCEDURE — 700111 HCHG RX REV CODE 636 W/ 250 OVERRIDE (IP): Performed by: INTERNAL MEDICINE

## 2024-12-07 PROCEDURE — 700111 HCHG RX REV CODE 636 W/ 250 OVERRIDE (IP)

## 2024-12-07 PROCEDURE — 700102 HCHG RX REV CODE 250 W/ 637 OVERRIDE(OP): Performed by: INTERNAL MEDICINE

## 2024-12-07 PROCEDURE — 700101 HCHG RX REV CODE 250: Performed by: INTERNAL MEDICINE

## 2024-12-07 PROCEDURE — 84681 ASSAY OF C-PEPTIDE: CPT

## 2024-12-07 PROCEDURE — 99232 SBSQ HOSP IP/OBS MODERATE 35: CPT | Performed by: INTERNAL MEDICINE

## 2024-12-07 PROCEDURE — 81001 URINALYSIS AUTO W/SCOPE: CPT

## 2024-12-07 PROCEDURE — 99222 1ST HOSP IP/OBS MODERATE 55: CPT | Performed by: STUDENT IN AN ORGANIZED HEALTH CARE EDUCATION/TRAINING PROGRAM

## 2024-12-07 PROCEDURE — 80307 DRUG TEST PRSMV CHEM ANLYZR: CPT

## 2024-12-07 PROCEDURE — 36415 COLL VENOUS BLD VENIPUNCTURE: CPT

## 2024-12-07 PROCEDURE — A9270 NON-COVERED ITEM OR SERVICE: HCPCS

## 2024-12-07 PROCEDURE — 700102 HCHG RX REV CODE 250 W/ 637 OVERRIDE(OP)

## 2024-12-07 PROCEDURE — 82962 GLUCOSE BLOOD TEST: CPT

## 2024-12-07 PROCEDURE — 700117 HCHG RX CONTRAST REV CODE 255: Performed by: EMERGENCY MEDICINE

## 2024-12-07 PROCEDURE — 770001 HCHG ROOM/CARE - MED/SURG/GYN PRIV*

## 2024-12-07 PROCEDURE — 700101 HCHG RX REV CODE 250

## 2024-12-07 PROCEDURE — 71275 CT ANGIOGRAPHY CHEST: CPT

## 2024-12-07 PROCEDURE — 700105 HCHG RX REV CODE 258: Performed by: INTERNAL MEDICINE

## 2024-12-07 RX ORDER — LIDOCAINE 4 G/G
1 PATCH TOPICAL EVERY 24 HOURS
Status: DISCONTINUED | OUTPATIENT
Start: 2024-12-07 | End: 2024-12-12

## 2024-12-07 RX ORDER — LIDOCAINE HYDROCHLORIDE 20 MG/ML
JELLY TOPICAL
Status: COMPLETED | OUTPATIENT
Start: 2024-12-07 | End: 2024-12-07

## 2024-12-07 RX ORDER — TAMSULOSIN HYDROCHLORIDE 0.4 MG/1
0.4 CAPSULE ORAL
Status: DISCONTINUED | OUTPATIENT
Start: 2024-12-07 | End: 2024-12-13 | Stop reason: HOSPADM

## 2024-12-07 RX ORDER — SUCRALFATE ORAL 1 G/10ML
1 SUSPENSION ORAL
Status: DISCONTINUED | OUTPATIENT
Start: 2024-12-07 | End: 2024-12-13 | Stop reason: HOSPADM

## 2024-12-07 RX ORDER — AZITHROMYCIN 500 MG/5ML
500 INJECTION, POWDER, LYOPHILIZED, FOR SOLUTION INTRAVENOUS ONCE
Status: COMPLETED | OUTPATIENT
Start: 2024-12-07 | End: 2024-12-07

## 2024-12-07 RX ADMIN — OXYCODONE 5 MG: 5 TABLET ORAL at 20:35

## 2024-12-07 RX ADMIN — INSULIN GLARGINE-YFGN 20 UNITS: 100 INJECTION, SOLUTION SUBCUTANEOUS at 19:26

## 2024-12-07 RX ADMIN — ENOXAPARIN SODIUM 40 MG: 100 INJECTION SUBCUTANEOUS at 17:06

## 2024-12-07 RX ADMIN — ASPIRIN 81 MG: 81 TABLET, COATED ORAL at 05:38

## 2024-12-07 RX ADMIN — TAMSULOSIN HYDROCHLORIDE 0.4 MG: 0.4 CAPSULE ORAL at 09:02

## 2024-12-07 RX ADMIN — SUCRALFATE ORAL 1 G: 1 SUSPENSION ORAL at 20:36

## 2024-12-07 RX ADMIN — FAMOTIDINE 20 MG: 10 INJECTION, SOLUTION INTRAVENOUS at 05:38

## 2024-12-07 RX ADMIN — IOHEXOL 64 ML: 350 INJECTION, SOLUTION INTRAVENOUS at 01:53

## 2024-12-07 RX ADMIN — INSULIN LISPRO 3 UNITS: 100 INJECTION, SOLUTION INTRAVENOUS; SUBCUTANEOUS at 21:42

## 2024-12-07 RX ADMIN — GABAPENTIN 100 MG: 100 CAPSULE ORAL at 05:38

## 2024-12-07 RX ADMIN — SUCRALFATE ORAL 1 G: 1 SUSPENSION ORAL at 17:06

## 2024-12-07 RX ADMIN — SODIUM CHLORIDE: 9 INJECTION, SOLUTION INTRAVENOUS at 03:06

## 2024-12-07 RX ADMIN — OXYCODONE 5 MG: 5 TABLET ORAL at 09:02

## 2024-12-07 RX ADMIN — OXYCODONE 5 MG: 5 TABLET ORAL at 00:43

## 2024-12-07 RX ADMIN — SENNOSIDES AND DOCUSATE SODIUM 2 TABLET: 50; 8.6 TABLET ORAL at 17:06

## 2024-12-07 RX ADMIN — LIDOCAINE 1 PATCH: 560 PATCH PERCUTANEOUS; TOPICAL; TRANSDERMAL at 14:12

## 2024-12-07 RX ADMIN — HYDROMORPHONE HYDROCHLORIDE 0.25 MG: 1 INJECTION, SOLUTION INTRAMUSCULAR; INTRAVENOUS; SUBCUTANEOUS at 10:34

## 2024-12-07 RX ADMIN — OMEPRAZOLE 40 MG: 20 CAPSULE, DELAYED RELEASE ORAL at 09:02

## 2024-12-07 RX ADMIN — GABAPENTIN 100 MG: 100 CAPSULE ORAL at 14:12

## 2024-12-07 RX ADMIN — LIDOCAINE HYDROCHLORIDE 30 ML: 20 SOLUTION OROPHARYNGEAL at 09:03

## 2024-12-07 RX ADMIN — GABAPENTIN 100 MG: 100 CAPSULE ORAL at 20:35

## 2024-12-07 RX ADMIN — FAMOTIDINE 20 MG: 10 INJECTION, SOLUTION INTRAVENOUS at 17:06

## 2024-12-07 RX ADMIN — AZITHROMYCIN 500 MG: 500 INJECTION, POWDER, LYOPHILIZED, FOR SOLUTION INTRAVENOUS at 03:29

## 2024-12-07 RX ADMIN — GABAPENTIN 100 MG: 100 CAPSULE ORAL at 00:43

## 2024-12-07 RX ADMIN — LIDOCAINE HYDROCHLORIDE 6 ML: 20 JELLY TOPICAL at 01:30

## 2024-12-07 ASSESSMENT — ENCOUNTER SYMPTOMS
CONSTIPATION: 0
DIARRHEA: 0
BLURRED VISION: 0
DIZZINESS: 0
NERVOUS/ANXIOUS: 0
NAUSEA: 0
SHORTNESS OF BREATH: 0
HALLUCINATIONS: 0
HEMOPTYSIS: 0
WEIGHT LOSS: 1
HEADACHES: 0
HEARTBURN: 1
VOMITING: 0
CONSTIPATION: 1
DEPRESSION: 1
PALPITATIONS: 0
MYALGIAS: 0
MEMORY LOSS: 0
COUGH: 0
CHILLS: 0
DEPRESSION: 0
ABDOMINAL PAIN: 1
INSOMNIA: 0
NAUSEA: 1
FEVER: 0

## 2024-12-07 ASSESSMENT — PAIN DESCRIPTION - PAIN TYPE
TYPE: ACUTE PAIN

## 2024-12-07 ASSESSMENT — COPD QUESTIONNAIRES
COPD SCREENING SCORE: 2
DURING THE PAST 4 WEEKS HOW MUCH DID YOU FEEL SHORT OF BREATH: NONE/LITTLE OF THE TIME
HAVE YOU SMOKED AT LEAST 100 CIGARETTES IN YOUR ENTIRE LIFE: NO/DON'T KNOW
DO YOU EVER COUGH UP ANY MUCUS OR PHLEGM?: NO/ONLY WITH OCCASIONAL COLDS OR INFECTIONS

## 2024-12-07 ASSESSMENT — LIFESTYLE VARIABLES: SUBSTANCE_ABUSE: 0

## 2024-12-07 ASSESSMENT — FIBROSIS 4 INDEX: FIB4 SCORE: 1.24

## 2024-12-07 NOTE — ED NOTES
"Unable to obtain med rec at this time  Patient responds \"I don't know\" to all questions    Per [facility], medications last filled in October for 30 day supply only    America Boo CPhT  "

## 2024-12-07 NOTE — CARE PLAN
The patient is stable.     Shift Goals  Clinical Goals: Safety, pain control, rest  Patient Goals: Pain control  Family Goals: KATHERINE    Progress made toward(s) clinical / shift goals:  Pain with eating. Prn's given (see MAR). Patient napped often throughout the day.     Patient is not progressing towards the following goals: Requiring 2-3 liters of oxygen; needing more when he falls asleep. Jarvis Rojas notified.

## 2024-12-07 NOTE — ED NOTES
Pt aware of need for urine sample, unable to void at this time. Urinal at bedside. Medicated per MAR.

## 2024-12-07 NOTE — ED TRIAGE NOTES
Chief Complaint   Patient presents with    Abdominal Pain     X months.  Pt seen here yesterday for similar complaint.      Elevated Glucose     Pt arrives with a FSBG=  429.  He reports he has not taken his lantus x 4 days.  He reports he has not eaten much over last four days.      Suicidal Ideation     Pt reports feeling suicidal last night after being discharged home     Low Risk SI per [place] scale.  Pt arrives via [facility] c/o worsening abdominal pain since last admission, accompanied with vomiting and thoughts of hopelessness.  Pt received 200 ml IVF and 4 mg zofran IV en route.

## 2024-12-07 NOTE — CONSULTS
"Patient wiling to retrial venlafaxine for depression as it was well tolerated. Patient Unable to continue medication due to difficulty getting to the pharmacy. Agree with continuing legal hold. Will Follow    DIAMANTE Taylor      PSYCHIATRIC INTAKE EVALUATION: (new)  Reason for Admission: Respiratory failure (HCC) [J96.90]  Reason for Consult: Legal Hold Evaluation  Requesting Provider: Juvenal Bailon M.D.   Legal Hold Status: on legal hold  Chart reviewed.         CC:  Chief Complaint   Patient presents with    Abdominal Pain     X months.  Pt seen here yesterday for similar complaint.      Elevated Glucose     Pt arrives with a FSBG=  429.  He reports he has not taken his lantus x 4 days.  He reports he has not eaten much over last four days.      Suicidal Ideation     Pt reports feeling suicidal last night after being discharged home        HPI:       Patient is a 59 year old male with no reported mental health Dx, PMH CKD, KRISTAL, IDDM, syncope, respiratory disease; being assessed for legal hold following high CSSRS. Patient able to talk about situation leading to hospitalization as increased abdominal pain with emesis. Reports abd pain has subsided since initially hospitalized.  was expressing concerns about being able to care for self without assistance while at home; expressing concerns about his ability to obtain food, and medications.  has not had a meal in 4 days prior to admission, denies taking insulin for at least the same duration.  previously was managing his blood sugars, taking them at least twice daily, and administering long acting, and short acting insulin; however, does vocalize \"it's confusing,\" and is currently unable to vocalize his regimen.     Identified Sx of depression including low mood, poor appetite, no energy, anhedonia, insomnia. Admits guilt about lack of support and family \"not giving a shit.\" Recently experienced Thanksgiving alone, state only brother-in-law " "reached out. Expressing hopelessness, states \"they treat me like a burden.\" Expressing concerns for safety while at home, states [facility] was unable to provide services from holiday until the following Tuesday, causing disruption to ability to eat, and maintain blood sugars. Vocalized feeling mentally unwell, based on current situation. States \"no body cares,\" expressing feeling like there has been limited improved since previous hospitalization, and the aid, information, and interventions received at the hospital are not meaningful.     Agreeable to seek Tx for identified depression; however, vocalizes concerns about continued treatment because of the inability to get to the pharmacy, denies having support to get medications delivered or alternative to driving to pharmacy. Unable to vocalize alternative to maintain wellness, perseverating on barriers to care, and current medical conditions.    Psychiatric ROS:  Depression:   Reports Sx of depression including: depressed mood, insomnia (3H QHS), decreased interests, energy, concentration, and appetite. Admits guilt related to strained family relationship. Admits suicidal thinking with planning to use gun, has access.     Alejandra: Denies    Anxiety:   Reports Sx of anxiety including: restlessness, irritability, and sleep disruption.   Unable to vocalize Sx consistent with panic disorder.  Psychosis: Denies    PTSD:Denies    Patient denies thoughts of self-harm, denies current HI, A/VH. Patient denies symptoms indicative of psychosis, alejandra/hypomania, PTSD, OCD, or ADHD.     Medical ROS (as pertinent):     Review of Systems   Cardiovascular:  Negative for chest pain and palpitations.   Gastrointestinal:  Positive for abdominal pain and constipation. Negative for nausea and vomiting.   Neurological:  Negative for dizziness and headaches.   Psychiatric/Behavioral:  Positive for depression and suicidal ideas. Negative for hallucinations, memory loss and " "substance abuse. The patient is not nervous/anxious and does not have insomnia.          Psychiatric Examination:  Vitals: /82   Pulse 89   Temp 36.7 °C (98.1 °F) (Temporal)   Resp 16   Ht 1.829 m (6' 0.01\")   Wt 77.6 kg (171 lb 1.2 oz)   SpO2 99%     General Appearance: observed in hospital gown, disheveled and emotionally labile, maintains poor eye contact with superficially cooperative behavior  Abnormal Movements: none, no PMA/PMR or tremor observed.  Gait/Posture: not observed  Speech: soft-spoken  Thought Process: Perseverative  Associations: None  Abnormal/Psychotic Thoughts: Denies A/VH. No e/o delusions, paranoia, or internal preoccupation  Judgement/Insight: limited/limited  Orientation: alert, oriented to person, place and time  Recent/Remote Memory: No gross evidence of memory deficits, recall 2/3 without prompts, 3/3 with cue  Attention/Concentration: appropriate; able to spell WORLD backwards  Language: spontaneous, comprehends spoken commands, and fluent   Fund of Knowledge: Average; based on reported education level  Mood and Affect: \"surviving\" Flat  SI/HI: Admits SI without planning while hospitalized. Denies HI    Past Medical History:   Past Medical History:   Diagnosis Date    Abnormal electrocardiogram (ECG) (EKG) 11/08/2021    Acute cystitis without hematuria 01/26/2024    8/10 positive urinalysis, with associated hyperglycemia, will start antibiotics, follow-up cultures, initial culture with UA on August 8 was negative      Acute esophagitis 07/28/2022    Acute hypoxic respiratory failure (HCC) 07/22/2024    Acute metabolic encephalopathy 06/09/2023    Acute on chronic anemia 05/06/2024    Acute on chronic respiratory failure (HCC) 05/20/2024    Acute respiratory failure with hypoxia (HCC) 07/25/2024    Acute superficial gastritis without hemorrhage 05/21/2024    Agitation 06/09/2024    KRISTAL (acute kidney injury) (HCC) 11/08/2021    AP (abdominal pain) 06/08/2023    Chest pain in " "adult 11/08/2021    Chronic renal insufficiency 06/04/2024    CKD (chronic kidney disease) stage 3, GFR 30-59 ml/min 11/07/2021    Diabetes ([facility])     Diabetic ketoacidosis without coma ([facility]) 11/07/2021    Diabetic ketoacidosis without coma associated with type 2 diabetes mellitus ([facility]) 11/07/2021    Diarrhea 03/18/2022    DKA, type 1, not at goal ([facility]) 07/28/2022    Elevated troponin 06/10/2023    Esophagitis 07/28/2022    [place]     Very [place] No hearing aides    Hypercholesteremia     Impaction of intestine ([facility]) 05/21/2024    Intractable left upper quadrant abdominal pain 05/21/2024    Leukocytosis 05/20/2024    Metabolic acidosis, increased anion gap 11/07/2021    Syncope 06/09/2023    Transaminitis 06/12/2024       Past Psychiatric History:  Previous Dx: depression  Current medications: No psychotropic medications currently  Previous medication trials: Effexor, Trazodone  IP Hospitalizations: Denies  Suicide attempts/SH Bx: Yes, admit putting gun to head without pulling trigger \"a week ago\"  OP: Denies  Access to guns: HAS access to firearm  Abuse/Trauma Hx: Denies  Legal Hx: no current probation/parole    Family Psych Hx:   Maternal: ETOH    Social Hx:   Born in [facility] for 4 years  Education: graduated HS  Support: \"not a single person\"   x2,  x2, currently single  Has 3 adult male children  Has 1 brother, 1 sister  Housing: lives alone, has pet dog, Carlos, border collie  Financial: fixed income from [employer], retired 5 years    Substance:  Alcohol: Remote Hx, stopped drinking after becoming a parent  Nicotine: Remote Hx, stopped smoking in 1999  Drugs: Denies recent illicit substances, including cannabis; admits remote Hx, denies Hx of addiction to substances  Substance Tx: Denies    Allergies:  Ketamine     Labs: Reviewed  Recent Results (from the past 72 hours)   POCT glucose device results    Collection Time: 12/05/24  6:56 AM   Result Value Ref Range    POC " Glucose, Blood 425 (HH) 65 - 99 mg/dL   CBC WITH DIFFERENTIAL    Collection Time: 12/05/24  7:00 AM   Result Value Ref Range    WBC 11.2 (H) 4.8 - 10.8 K/uL    RBC 4.64 (L) 4.70 - 6.10 M/uL    Hemoglobin 12.2 (L) 14.0 - 18.0 g/dL    Hematocrit 37.0 (L) 42.0 - 52.0 %    MCV 79.7 (L) 81.4 - 97.8 fL    MCH 26.3 (L) 27.0 - 33.0 pg    MCHC 33.0 32.3 - 36.5 g/dL    RDW 38.3 35.9 - 50.0 fL    Platelet Count 252 164 - 446 K/uL    MPV 9.2 9.0 - 12.9 fL    Neutrophils-Polys 89.60 (H) 44.00 - 72.00 %    Lymphocytes 6.60 (L) 22.00 - 41.00 %    Monocytes 3.40 0.00 - 13.40 %    Eosinophils 0.10 0.00 - 6.90 %    Basophils 0.10 0.00 - 1.80 %    Immature Granulocytes 0.20 0.00 - 0.90 %    Nucleated RBC 0.00 0.00 - 0.20 /100 WBC    Neutrophils (Absolute) 10.07 (H) 1.82 - 7.42 K/uL    Lymphs (Absolute) 0.74 (L) 1.00 - 4.80 K/uL    Monos (Absolute) 0.38 0.00 - 0.85 K/uL    Eos (Absolute) 0.01 0.00 - 0.51 K/uL    Baso (Absolute) 0.01 0.00 - 0.12 K/uL    Immature Granulocytes (abs) 0.02 0.00 - 0.11 K/uL    NRBC (Absolute) 0.00 K/uL   COMP METABOLIC PANEL    Collection Time: 12/05/24  7:00 AM   Result Value Ref Range    Sodium 132 (L) 135 - 145 mmol/L    Potassium 4.2 3.6 - 5.5 mmol/L    Chloride 91 (L) 96 - 112 mmol/L    Co2 23 20 - 33 mmol/L    Anion Gap 18.0 (H) 7.0 - 16.0    Glucose 450 (H) 65 - 99 mg/dL    Bun 21 8 - 22 mg/dL    Creatinine 0.83 0.50 - 1.40 mg/dL    Calcium 8.9 8.5 - 10.5 mg/dL    Correct Calcium 9.2 8.5 - 10.5 mg/dL    AST(SGOT) 11 (L) 12 - 45 U/L    ALT(SGPT) 6 2 - 50 U/L    Alkaline Phosphatase 95 30 - 99 U/L    Total Bilirubin 0.5 0.1 - 1.5 mg/dL    Albumin 3.6 3.2 - 4.9 g/dL    Total Protein 6.7 6.0 - 8.2 g/dL    Globulin 3.1 1.9 - 3.5 g/dL    A-G Ratio 1.2 g/dL   LIPASE    Collection Time: 12/05/24  7:00 AM   Result Value Ref Range    Lipase 11 11 - 82 U/L   ESTIMATED GFR    Collection Time: 12/05/24  7:00 AM   Result Value Ref Range    GFR (CKD-EPI) 100 >60 mL/min/1.73 m 2   POCT glucose device results     Collection Time: 12/05/24 10:39 AM   Result Value Ref Range    POC Glucose, Blood 431 (HH) 65 - 99 mg/dL   POCT glucose device results    Collection Time: 12/05/24  4:31 PM   Result Value Ref Range    POC Glucose, Blood 412 (HH) 65 - 99 mg/dL   POCT glucose device results    Collection Time: 12/05/24  5:08 PM   Result Value Ref Range    POC Glucose, Blood 378 (H) 65 - 99 mg/dL   CBC WITH DIFFERENTIAL    Collection Time: 12/06/24  4:54 PM   Result Value Ref Range    WBC 14.2 (H) 4.8 - 10.8 K/uL    RBC 4.18 (L) 4.70 - 6.10 M/uL    Hemoglobin 11.1 (L) 14.0 - 18.0 g/dL    Hematocrit 33.9 (L) 42.0 - 52.0 %    MCV 81.1 (L) 81.4 - 97.8 fL    MCH 26.6 (L) 27.0 - 33.0 pg    MCHC 32.7 32.3 - 36.5 g/dL    RDW 40.0 35.9 - 50.0 fL    Platelet Count 273 164 - 446 K/uL    MPV 9.4 9.0 - 12.9 fL    Neutrophils-Polys 87.30 (H) 44.00 - 72.00 %    Lymphocytes 6.80 (L) 22.00 - 41.00 %    Monocytes 5.10 0.00 - 13.40 %    Eosinophils 0.10 0.00 - 6.90 %    Basophils 0.10 0.00 - 1.80 %    Immature Granulocytes 0.60 0.00 - 0.90 %    Nucleated RBC 0.00 0.00 - 0.20 /100 WBC    Neutrophils (Absolute) 12.41 (H) 1.82 - 7.42 K/uL    Lymphs (Absolute) 0.96 (L) 1.00 - 4.80 K/uL    Monos (Absolute) 0.73 0.00 - 0.85 K/uL    Eos (Absolute) 0.01 0.00 - 0.51 K/uL    Baso (Absolute) 0.02 0.00 - 0.12 K/uL    Immature Granulocytes (abs) 0.08 0.00 - 0.11 K/uL    NRBC (Absolute) 0.00 K/uL   COMP METABOLIC PANEL    Collection Time: 12/06/24  4:54 PM   Result Value Ref Range    Sodium 132 (L) 135 - 145 mmol/L    Potassium 4.6 3.6 - 5.5 mmol/L    Chloride 92 (L) 96 - 112 mmol/L    Co2 22 20 - 33 mmol/L    Anion Gap 18.0 (H) 7.0 - 16.0    Glucose 459 (H) 65 - 99 mg/dL    Bun 31 (H) 8 - 22 mg/dL    Creatinine 1.12 0.50 - 1.40 mg/dL    Calcium 8.6 8.5 - 10.5 mg/dL    Correct Calcium 8.9 8.5 - 10.5 mg/dL    AST(SGOT) 11 (L) 12 - 45 U/L    ALT(SGPT) <5 2 - 50 U/L    Alkaline Phosphatase 91 30 - 99 U/L    Total Bilirubin 0.4 0.1 - 1.5 mg/dL    Albumin 3.6 3.2 - 4.9  g/dL    Total Protein 6.4 6.0 - 8.2 g/dL    Globulin 2.8 1.9 - 3.5 g/dL    A-G Ratio 1.3 g/dL   LIPASE    Collection Time: 24  4:54 PM   Result Value Ref Range    Lipase 12 11 - 82 U/L   ESTIMATED GFR    Collection Time: 24  4:54 PM   Result Value Ref Range    GFR (CKD-EPI) 75 >60 mL/min/1.73 m 2   CREATINE KINASE    Collection Time: 24  4:54 PM   Result Value Ref Range    CPK Total 37 0 - 154 U/L   proBrain Natriuretic Peptide, NT    Collection Time: 24  4:54 PM   Result Value Ref Range    NT-proBNP 322 (H) 0 - 125 pg/mL   TSH WITH REFLEX TO FT4    Collection Time: 24  4:54 PM   Result Value Ref Range    TSH 1.280 0.380 - 5.330 uIU/mL   PROCALCITONIN    Collection Time: 24  4:54 PM   Result Value Ref Range    Procalcitonin 0.07 <0.25 ng/mL   CRP QUANTITIVE (NON-CARDIAC)    Collection Time: 24  4:54 PM   Result Value Ref Range    Stat C-Reactive Protein 1.01 (H) 0.00 - 0.75 mg/dL   MAGNESIUM    Collection Time: 24  4:54 PM   Result Value Ref Range    Magnesium 2.0 1.5 - 2.5 mg/dL   PHOSPHORUS    Collection Time: 24  4:54 PM   Result Value Ref Range    Phosphorus 3.2 2.5 - 4.5 mg/dL   DIAGNOSTIC ALCOHOL    Collection Time: 24  4:54 PM   Result Value Ref Range    Diagnostic Alcohol <10.1 <10.1 mg/dL   EKG    Collection Time: 24  8:59 PM   Result Value Ref Range    Report       [facility] Emergency Dept.    Test Date:  2024  Pt Name:    BAILEE JACOBSON               Department: ER  MRN:        [id number]                      Room:       [facility]  Gender:     Male                         Technician: [id number]  :        1965                   Requested By:PRADEEP MARTIN  Order #:    [id number]                    Reading MD:    Measurements  Intervals                                Axis  Rate:       97                           P:          47  WY:         166                          QRS:        51  QRSD:       100                           T:          40  QT:         364  QTc:        463    Interpretive Statements  Sinus rhythm  Low voltage, extremity leads  Compared to ECG 10/23/2024 14:50:29  No significant changes     POC CoV-2, FLU A/B, RSV by PCR    Collection Time: 12/06/24  9:12 PM   Result Value Ref Range    POC Influenza A RNA, PCR Negative Negative    POC Influenza B RNA, PCR Negative Negative    POC RSV, by PCR Negative Negative    POC SARS-CoV-2, PCR NotDetected NotDetected   POCT glucose device results    Collection Time: 12/06/24 10:32 PM   Result Value Ref Range    POC Glucose, Blood 390 (H) 65 - 99 mg/dL   URINALYSIS    Collection Time: 12/07/24  1:10 AM    Specimen: Urine, Clean Catch   Result Value Ref Range    Color Yellow     Character Clear     Specific Gravity 1.028 <1.035    Ph 5.5 5.0 - 8.0    Glucose >=1000 (A) Negative mg/dL    Ketones 80 (A) Negative mg/dL    Protein 100 (A) Negative mg/dL    Bilirubin Negative Negative    Urobilinogen, Urine 0.2 <=1.0 EU/dL    Nitrite Negative Negative    Leukocyte Esterase Negative Negative    Occult Blood Small (A) Negative    Micro Urine Req Microscopic    URINE DRUG SCREEN    Collection Time: 12/07/24  1:10 AM   Result Value Ref Range    Amphetamines Urine Negative Negative    Barbiturates Negative Negative    Benzodiazepines Negative Negative    Cocaine Metabolite Negative Negative    Fentanyl, Urine Negative Negative    Methadone Negative Negative    Opiates Positive (A) Negative    Oxycodone Positive (A) Negative    Phencyclidine -Pcp Negative Negative    Propoxyphene Negative Negative    Cannabinoid Metab Negative Negative   URINE MICROSCOPIC (W/UA)    Collection Time: 12/07/24  1:10 AM   Result Value Ref Range    WBC 0-2 /hpf    RBC 3-5 (A) 0 - 2 /hpf    Bacteria None None /hpf    Epithelial Cells 0-2 0 - 5 /hpf    Urine Casts 0-2 0 - 2 /lpf   CBC with Differential    Collection Time: 12/07/24  2:22 AM   Result Value Ref Range    WBC 10.4 4.8 - 10.8 K/uL    RBC 4.15 (L)  4.70 - 6.10 M/uL    Hemoglobin 10.7 (L) 14.0 - 18.0 g/dL    Hematocrit 33.0 (L) 42.0 - 52.0 %    MCV 79.5 (L) 81.4 - 97.8 fL    MCH 25.8 (L) 27.0 - 33.0 pg    MCHC 32.4 32.3 - 36.5 g/dL    RDW 37.9 35.9 - 50.0 fL    Platelet Count 251 164 - 446 K/uL    MPV 9.2 9.0 - 12.9 fL    Neutrophils-Polys 80.40 (H) 44.00 - 72.00 %    Lymphocytes 11.40 (L) 22.00 - 41.00 %    Monocytes 7.50 0.00 - 13.40 %    Eosinophils 0.20 0.00 - 6.90 %    Basophils 0.10 0.00 - 1.80 %    Immature Granulocytes 0.40 0.00 - 0.90 %    Nucleated RBC 0.00 0.00 - 0.20 /100 WBC    Neutrophils (Absolute) 8.38 (H) 1.82 - 7.42 K/uL    Lymphs (Absolute) 1.19 1.00 - 4.80 K/uL    Monos (Absolute) 0.78 0.00 - 0.85 K/uL    Eos (Absolute) 0.02 0.00 - 0.51 K/uL    Baso (Absolute) 0.01 0.00 - 0.12 K/uL    Immature Granulocytes (abs) 0.04 0.00 - 0.11 K/uL    NRBC (Absolute) 0.00 K/uL   Comp Metabolic Panel (CMP)    Collection Time: 12/07/24  2:22 AM   Result Value Ref Range    Sodium 134 (L) 135 - 145 mmol/L    Potassium 4.0 3.6 - 5.5 mmol/L    Chloride 96 96 - 112 mmol/L    Co2 26 20 - 33 mmol/L    Anion Gap 12.0 7.0 - 16.0    Glucose 203 (H) 65 - 99 mg/dL    Bun 25 (H) 8 - 22 mg/dL    Creatinine 0.96 0.50 - 1.40 mg/dL    Calcium 8.8 8.5 - 10.5 mg/dL    Correct Calcium 9.3 8.5 - 10.5 mg/dL    AST(SGOT) 11 (L) 12 - 45 U/L    ALT(SGPT) <5 2 - 50 U/L    Alkaline Phosphatase 102 (H) 30 - 99 U/L    Total Bilirubin 0.2 0.1 - 1.5 mg/dL    Albumin 3.4 3.2 - 4.9 g/dL    Total Protein 5.9 (L) 6.0 - 8.2 g/dL    Globulin 2.5 1.9 - 3.5 g/dL    A-G Ratio 1.4 g/dL   ESTIMATED GFR    Collection Time: 12/07/24  2:22 AM   Result Value Ref Range    GFR (CKD-EPI) 90 >60 mL/min/1.73 m 2       Brain Imaging: Reviewed   CT-CTA CHEST PULMONARY ARTERY W/ RECONS   Final Result      1.  No CT evidence of pulmonary emboli.      2.  Mild wedge type compression fracture of T12 vertebral body of indeterminate age likely chronic.            DX-CHEST-LIMITED (1 VIEW)   Final Result         No  acute cardiopulmonary abnormalities are identified.          Current Medications:  Scheduled Medications   Medication Dose Frequency    omeprazole  40 mg DAILY    tamsulosin  0.4 mg AFTER BREAKFAST    enoxaparin (LOVENOX) injection  40 mg DAILY AT 1800    Pharmacy Consult Request  1 Each PHARMACY TO DOSE    senna-docusate  2 Tablet Q EVENING    famotidine  20 mg BID    insulin lispro  3-14 Units 4X/DAY ACHS    aspirin  81 mg DAILY    gabapentin  100 mg TID    insulin GLARGINE  20 Units Q EVENING       Assessment:   Patient is a 59 y.o. male currently on legal hold 2/2 SI, with plan, and recent gesture. Sx of depression including hopelessness, insomnia, low energy, poor appetite, and guilt experienced most days without relief. Remains focused on being a burden to family, and unable to identify supportive persons or services to assist with daily activities. Admits increasing depression since summer, and worsening over the holiday weekend to the point of suicidal thinking with intent and reported gesture. Previously diagnosed with major depressive disorder, unable to determine benefit from previous medication trial 2/2 nonadherence following dc, vocalized concerns about ability to obtain medications, maintain follow up appointments, and continues treatment 2/2 mobility and transportation issues. Could benefit from CM to determine eligibility of additional services to support identified need. Plan to restart Effexor at low dose and titrate up as needed based on condition. Would benefit from psychotherapy when available.     Unable to identify protective factors. Notable risk factors include: Hx of nonadherence, prior SG, limited support, and limited coping skills.    Patient continues to present an acute danger to self 2/2 SI with planning. Legal hold initiated, pending certification, and will seek IP placement once medically cleared/appropriate.     Dx:  Major depressive disorder    Medical: as noted by the medical  treatment team.   Principal Problem:    Respiratory failure (HCC) (POA: Yes)  Active Problems:    Leukocytosis (POA: Yes)    CKD (chronic kidney disease) (POA: Yes)    Uncontrolled type 2 diabetes mellitus with hyperglycemia (HCC) (POA: Yes)  Resolved Problems:    * No resolved hospital problems. *      Plan:  Legal hold: Yes - initiated 12/7, will pursue extension  Psychotropic medications:   Start Effexor 37.5mg PO daily for mood  Please transfer to inpatient psychiatric hospital when medically cleared and bed is available  Labs reviewed, consider ordering B12, Vitamin D for workup of depression  EKG reviewed,   Old records ordered/reviewed/summarized  Discussed the case with: SINA Taylor DO; NEO Rojas MD  Psychiatry will follow up.     Thank you for the consult.     Sitter: Yes 1:1 Sitter  Phone: No  Visitors: No  Personal belongings: No    This note was created using voice recognition software (Dragon). The accuracy of the dictation is limited by the abilities of the software. I have reviewed the note prior to signing. However, error related to voice recognition software and /or scribes may still exist and should be interpreted within the appropriate context.      I personally performed the service, or was physically present during the key or critical portions of the service when performed by a resident; participated in the management of the patient. I have reviewed Merlin PATTERSON above note agree with the documented finding and plan of care.

## 2024-12-07 NOTE — HOSPITAL COURSE
Christoph Taylor is a 59 year old male patient with PMHx of T2DM with neuropathy (HbA1C: 13), severe LA class D oesophagitis, CKD, HLD, Chronic lower back and neck pain with motor neuropathy of upper B/L limbs, and previous hospitalization for DKA, recurrent oesophagitis presented to the hospital on 12/6/2024 via [facility] for intractable abdominal pain, hyperglycemia and suicidal ideation. He was recently seen in ED on 12/5 for similar abdominal pain, and sent home when no acute abnormalities were detected.     On this admission his abdominal pain is located to the same spot, left upper quadrant under his ribs. Symptomatologies are in keeping with likely peptic ulcer disease as the pain is trigger by oral intake, and relieved with anti-acids. His last EGD was done in 3/2023 which showed severe oesophagitis. Repeat EGD on 12/9 showed esophageal findings consistent with LA class D erosive esophagitis, Hiatal hernia and prominent duodenal ampullae.  GI recommends: Follow up path  -Will need PPI BID x 8 weeks then once daily indefinitely  -Carafate slurry QID x 2 weeks then stop  -Gastroparesis diet  -Good control of diabetes  -Reglan with good PCP or GI follow up  -Highly emphasize follow up with GI for EGD in 8+ weeks to document healing of esophagitis and no presence of Pereira's    Patient able to tolerate more foods with reglan and above management, more than his baseline, and I suspect he will be able to tolerate more as the days go on. He still has postprandial pain, which I do not believe will require narcotics to treat, and should subside with time.    His poorly controlled diabetes is a result of noncompliance which is due to poor understanding and fear of insulin therapy (which had previously resulted in reported cardiac arrest). Diabetic education consulted. He is placed on glargine 20units and SSI.  A jiménez catheter was placed due to retention of urine. We believe it is likely he will need a jiménez catheter  chronically for likely neurogenic bladder 2/2 to diabetes. He will need a Urology outpatient follow up    Incidental finding of a t12 compression fracture on CTA chest, which is chronic. Lidocaine patches prescribed.  He also has chronic bilateral motor neuropathy in the ulnar and median nerve distributions. Would recommend Outpatient orthopedics, and pain management consult.    During hospitalization patient expressed suicidal thoughts, in the context of severe depression and hopelessness given all his medical conditions he is trying to juggle. Legal hold was placed and recommendation by psychiatry to start Effexor for mood. On 12/12 Psychiatry removed legal hold. He will need outpatient follow up for Major Depressive disorder secondary to depression due to other medical condition    Patient will need subacute rehab for both physical and occupational therapy. His social situation is not optimal as he lives alone, and has difficulty pulling up insulin on his own. He has family in [place], and we recommended to patient that he try to live with them moving forward.

## 2024-12-07 NOTE — ASSESSMENT & PLAN NOTE
Patient had a creatinine 1.12, BUN 31 on presentation and was somewhat depleted intravascularly. He received 1L of IV fluids in the ED. RFTs on repeat shows improvement with Cr: 0.84 and BUN: 21.    -Avoid nephrotoxins  -Continue to monitor

## 2024-12-07 NOTE — ASSESSMENT & PLAN NOTE
He presented with blood sugar in the 400s due to noncompliance with long-acting insulin.His blood sugar levels have been low overnight with a range of  mg therefore, His Insulin Glargine has been reduced to 17 U from 20 U.  -Continue Insulin Glargine 17 units every evening  -Continue Insulin sliding scale  -Insulin Compliance counseling  -Diabetic educator

## 2024-12-07 NOTE — ASSESSMENT & PLAN NOTE
Patient had a WBC 14.2 on admission, likely reactive, 2/2 hyperglycemia or esophagitis as CT-Scan of the abdomen from yesterday showed possible esophagitis.  empirically received ceftriaxone in ER. Chest x-ray negative for pneumonia, CTPE of the chest was negative. UA negative for LE, nitrites, WBC and bacteria. WBC as of 12/8 is at 6.0.  -No antibiotics for now

## 2024-12-07 NOTE — CARE PLAN
The patient is Stable - Low risk of patient condition declining or worsening    Shift Goals  Clinical Goals: Pain control, jiménez insertion, safety  Patient Goals: pain control  Family Goals: KATHERINE    Progress made toward(s) clinical / shift goals:    Problem: Knowledge Deficit - Standard  Goal: Patient and family/care givers will demonstrate understanding of plan of care, disease process/condition, diagnostic tests and medications  Outcome: Progressing  Note: Patient educated on plan of care, patient verbalized understanding.     Problem: Provide Safe Environment  Goal: Suicide environmental safety, protocols, policies, and practices will be implemented  Outcome: Progressing  Flowsheets (Taken 12/7/2024 0747)  Safety Interventions:   Patient Room Close to Nursing Station   Patient Wearing Hospital Clothing   Personal Clothing / Belongings Removed (Comment: Storage Location)   Potentially Dangerous Items Removed from room   Plastic Utensils / Paper Ware Ordered   Provided Safety Education   Discussed no self harm or elopement and safe behavior with patient   Notify Receiving Department of Close Observation Status   Patient Accompanied by Unit Staff when off Unit.   Medically necessary equipment present, hourly room safety check, and post-meal tray check.  Note: Patient moved to private room, all unnecessary equipment removed from room, and 1:1 sitter at bedside.      Problem: Fall Risk  Goal: Patient will remain free from falls  Outcome: Progressing  Note: Bed in lowest position, wheels locked, bed alarm in use, 1:1 sitter at bedside.        Patient is not progressing towards the following goals:

## 2024-12-07 NOTE — ED NOTES
Pt O2 sat 60% on 2L/min NC with good waveform while sleeping. Pt awoken. Pt O2 sat increases to 94% on 2L/min NC. Pt complaining of new chest pain/ tightness. ERP notified.

## 2024-12-07 NOTE — PROGRESS NOTES
[facility] Internal Medicine Daily Progress Note    Date of Service  12/7/2024    R Team: R IM Blue Team   Attending: Dashawn Dobson M.d.  Senior Resident: Jarvis Girard  Intern:  Johnna Argueta  Contact Number: [phone]    Chief Complaint  Christoph Taylor is a 59 y.o. male admitted 12/6/2024 with intractable abdominal pain    Hospital Course  Christoph Taylor is a 59 y.o. type 2 diabetic male who presents via [facility] for abdominal pain, elevated glucose and suicidal ideations. Past medical history of chronic kidney disease, type 2 diabetes mellitus with neuropathy (A1C 13), previous hospitalization for DKA, esophagitis, hypercholesterolemia, chronic neck and lower back pains with possible motor neuropathy of his upper limbs bilaterally and left foot fracture.    Admitted 12/6/2024 for intractable abdominal pain, suicidal ideations, and uncontrolled type 2 diabetes mellitus. He was recently seen in ED on 12/5 for similar abdominal pain, and sent home when no acute abnormalities were detected.     On this admission his abdominal pain is located to the same spot, left upper quadrant under his ribs. Symptomatologies are in keeping with likely peptic ulcer disease as the pain is trigger by oral intake, and relieved with anti-acids.    His poorly controlled diabetes is a result of noncompliance which is due to poor understanding and fear of insulin therapy (which had previously resulted in reported cardiac arrest). Diabetic education has been consulted.    During hospitalization patient expressed suicidal thoughts, in the context of severe depression and hopelessness given all his medical conditions he is trying to juggle. Legal hold was placed and recommendation by psychiatry to start Effexor for mood and transfer to inpatient psychiatric hospital when medically cleared.    Interval Problem Update  Overnight, no events    Feeling better but still have on and off left upper quadrant pain, especially with oral  intake. Endorsed relief with Gi cocktail. Updated patient on plan to continue PPI and famotidine. Patient expressed his hopelessness and how hard it was to inject himself with any insulin given the disabilities with his hands. He is fearful that he would go hypoglycemic, like previously, and require cpr again.    I have discussed this patient's plan of care and discharge plan at IDT rounds today with Case Management, Nursing, Nursing leadership, and other members of the IDT team.    Consultants/Specialty  psychiatry    Code Status  Full Code    Disposition  The patient is not medically cleared for discharge to home or a post-acute facility.  Anticipate discharge to: a psychiatric hospital    I have placed the appropriate orders for post-discharge needs.    Review of Systems  Review of Systems   Constitutional:  Positive for malaise/fatigue and weight loss. Negative for chills and fever.   HENT:  Negative for hearing loss.    Eyes:  Negative for blurred vision.   Respiratory:  Negative for cough, hemoptysis and shortness of breath.    Cardiovascular:  Negative for chest pain and palpitations.   Gastrointestinal:  Positive for abdominal pain, heartburn and nausea. Negative for constipation, diarrhea and vomiting.   Genitourinary:  Negative for dysuria, hematuria and urgency.   Musculoskeletal:  Negative for myalgias.   Neurological:  Negative for dizziness and headaches.   Psychiatric/Behavioral:  Negative for depression.         Physical Exam  Temp:  [36.4 °C (97.5 °F)-36.9 °C (98.4 °F)] 36.7 °C (98.1 °F)  Pulse:  [] 89  Resp:  [12-22] 16  BP: (112-182)/() 124/82  SpO2:  [83 %-99 %] 99 %    Physical Exam  Constitutional:       General: He is not in acute distress.  HENT:      Mouth/Throat:      Mouth: Mucous membranes are moist.   Eyes:      Pupils: Pupils are equal, round, and reactive to light.   Cardiovascular:      Rate and Rhythm: Normal rate and regular rhythm.      Pulses: Normal pulses.      Heart  sounds: No murmur heard.  Pulmonary:      Effort: Pulmonary effort is normal. No respiratory distress.      Breath sounds: No wheezing.   Abdominal:      General: Abdomen is flat.      Palpations: Abdomen is soft.      Tenderness: There is abdominal tenderness (Left upper quadrant).   Musculoskeletal:         General: Deformity (Left foot) present. Normal range of motion.   Skin:     Capillary Refill: Capillary refill takes less than 2 seconds.   Neurological:      General: No focal deficit present.      Mental Status: He is alert and oriented to person, place, and time.      Motor: No weakness.   Psychiatric:      Comments: depressed         Fluids    Intake/Output Summary (Last 24 hours) at 12/7/2024 1354  Last data filed at 12/7/2024 0924  Gross per 24 hour   Intake 0 ml   Output 2150 ml   Net -2150 ml       Laboratory  Recent Labs     12/05/24  0700 12/06/24  1654 12/07/24  0222   WBC 11.2* 14.2* 10.4   RBC 4.64* 4.18* 4.15*   HEMOGLOBIN 12.2* 11.1* 10.7*   HEMATOCRIT 37.0* 33.9* 33.0*   MCV 79.7* 81.1* 79.5*   MCH 26.3* 26.6* 25.8*   MCHC 33.0 32.7 32.4   RDW 38.3 40.0 37.9   PLATELETCT 252 273 251   MPV 9.2 9.4 9.2     Recent Labs     12/05/24  0700 12/06/24  1654 12/07/24  0222   SODIUM 132* 132* 134*   POTASSIUM 4.2 4.6 4.0   CHLORIDE 91* 92* 96   CO2 23 22 26   GLUCOSE 450* 459* 203*   BUN 21 31* 25*   CREATININE 0.83 1.12 0.96   CALCIUM 8.9 8.6 8.8                   Imaging  CT-CTA CHEST PULMONARY ARTERY W/ RECONS   Final Result      1.  No CT evidence of pulmonary emboli.      2.  Mild wedge type compression fracture of T12 vertebral body of indeterminate age likely chronic.            DX-CHEST-LIMITED (1 VIEW)   Final Result         No acute cardiopulmonary abnormalities are identified.           Assessment/Plan  Problem Representation:    * Acute respiratory failure (HCC)- (present on admission)  Assessment & Plan  Currently Resolving. Presented with hypoxia of unclear etiology.    Chest x-ray is  negative for pneumonia,CTA of pulmonary artery negative for PE.  Consulting clinic on 7/2024 was normal.  Stress test in October 2024 was normal   he was given ceftriaxone empirically in the ER, however he has procalcitonin negative    Plan: spot pulse ox, wean off oxygen        Intractable left upper quadrant abdominal pain- (present on admission)  Assessment & Plan  Occurs with oral intake. Relieved with GI cocktail  - GI cocktail PRN  - Omeprazole 40mg daily  - famotidine 20mg BID  - scheduled sucralfate [id number]  Would recommend above for at least 6 weeks.  Outpatient EGD.     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    Major depressive disorder  Assessment & Plan  Depressive mood In context of several medical conditions and disabilities impacting quality of life  - psych consulted  - legal hold continued per psych  - effexor (venlafaxine) start per psych  - for inpatient psych transfer once medically cleared    Suicidal ideations  Assessment & Plan  Was placed on legal hold with sitter  - psych consult, appreciate recs  Passive thoughts, No plan  - see major depressive disorder plan    Chronic midline low back pain  Assessment & Plan  CTA chest revealed T12 fracture - likely chronic in nature  - pain management  - lido sagar  - avoid dilaudid  - oxy for breakthrough, however will likely discontinue once for sure etiology is gastric    LLQ abdominal pain  Assessment & Plan  It is chronic.  CT of the abdomen from 12/5 did not reveal    Uncontrolled type 2 diabetes mellitus with hyperglycemia (HCC)- (present on admission)  Assessment & Plan  Presented with blood sugar in the 400s due to noncompliance with long-acting insulin  Plan: Restart Lantus 20 units every evening  Insulin sliding scale  Compliance  counseling    CKD (chronic kidney disease)- (present on admission)  Assessment & Plan  Creatinine 1.12, BUN 31.  The patient may be somewhat intravascular depleted.    - s/p 1.0L IVF in ED  Avoid nephrotoxins    Leukocytosis- (present on admission)  Assessment & Plan  Perhaps it is reactive. WBC 14.2.  The patient empirically received ceftriaxone in ER  Possibly reactive secondary to hyperglycemia or esophagitis.  CT of the abdomen from yesterday showed possible esophagitis.  Chest x-ray negative for pneumonia, CT of the chest pending  UA pending  Procalcitonin is not elevated.  We will defer antibiotics for now.         VTE prophylaxis: enoxaparin ppx    I have performed a physical exam and reviewed and updated ROS and Plan today (12/7/2024). In review of yesterday's note (12/6/2024), there are no changes except as documented above.

## 2024-12-07 NOTE — ASSESSMENT & PLAN NOTE
Patient was placed on legal hold with sitter  - psych consult, appreciate recs  Passive thoughts, No plan  - see major depressive disorder plan

## 2024-12-07 NOTE — ED PROVIDER NOTES
ER Provider Note    Scribed for Wild Navarro M.d. by Wild Navarro M.D.. 12/6/2024  5:18 PM    Primary Care Provider: DIANA Tariq    CHIEF COMPLAINT  Chief Complaint   Patient presents with    Abdominal Pain     X months.  Pt seen here yesterday for similar complaint.      Elevated Glucose     Pt arrives with a FSBG=  429.  He reports he has not taken his lantus x 4 days.  He reports he has not eaten much over last four days.      Suicidal Ideation     Pt reports feeling suicidal last night after being discharged home     EXTERNAL RECORDS REVIEWED  Inpatient records show that patient was admitted to the hospital October 18 through 24th, evaluated for several different concerns including chronic abdominal pain.  CT for this complaint was obtained on October 24.  No culprit problems for the chronic abdominal pain were found.  Medications, including insulin were refilled at discharge from this hospitalization.      Records also show numerous 2024 hospitalizations for poorly controlled diabetes, DKA, HHNK, various orthopedic concerns, acute on chronic anemia.      HPI/ROS  LIMITATION TO HISTORY   Select: : None    OUTSIDE HISTORIAN(S):  None    Christoph Taylor is a 59 y.o. diabetic male who presents to the ED via [facility] complaining of abdominal onset a few months ago. The patient states he was told he had esophageal problems but notes his pain is in his abdomen. The patient states he has insulin at home. He also notes he has a self monitor that he has not set up yet. He states he has not taken his insulin over the last few days due to confusion on the long term and short term prescriptions.    PAST MEDICAL HISTORY  Past Medical History:   Diagnosis Date    Abnormal electrocardiogram (ECG) (EKG) 11/08/2021    Acute cystitis without hematuria 01/26/2024    8/10 positive urinalysis, with associated hyperglycemia, will start antibiotics, follow-up cultures, initial culture with UA on August 8 was  negative      Acute esophagitis 07/28/2022    Acute hypoxic respiratory failure (HCC) 07/22/2024    Acute metabolic encephalopathy 06/09/2023    Acute on chronic anemia 05/06/2024    Acute on chronic respiratory failure (HCC) 05/20/2024    Acute respiratory failure with hypoxia (HCC) 07/25/2024    Acute superficial gastritis without hemorrhage 05/21/2024    Agitation 06/09/2024    KRISTAL (acute kidney injury) (HCC) 11/08/2021    AP (abdominal pain) 06/08/2023    Chest pain in adult 11/08/2021    Chronic renal insufficiency 06/04/2024    CKD (chronic kidney disease) stage 3, GFR 30-59 ml/min 11/07/2021    Diabetes ([facility])     Diabetic ketoacidosis without coma ([facility]) 11/07/2021    Diabetic ketoacidosis without coma associated with type 2 diabetes mellitus ([facility]) 11/07/2021    Diarrhea 03/18/2022    DKA, type 1, not at goal ([facility]) 07/28/2022    Elevated troponin 06/10/2023    Esophagitis 07/28/2022    [place] (hard of hearing)     Very [place] No hearing aides    Hypercholesteremia     Impaction of intestine ([facility]) 05/21/2024    Intractable left upper quadrant abdominal pain 05/21/2024    Leukocytosis 05/20/2024    Metabolic acidosis, increased anion gap 11/07/2021    Syncope 06/09/2023    Transaminitis 06/12/2024       SURGICAL HISTORY  Past Surgical History:   Procedure Laterality Date    FUSION, SPINE, LUMBAR, PLIF N/A 6/7/2024    Procedure: FUSION, SPINE, LUMBAR, PLIF- REDO LUMBAR 4-5 LAMINECTOMY WITH L4-5 STEALTH FUSION;  Surgeon: Moo Pulido III, M.D.;  Location: [facility];  Service: Neurosurgery    LUMBAR LAMINECTOMY DISKECTOMY N/A 6/7/2024    Procedure: LAMINECTOMY, SPINE, LUMBAR, WITH DISCECTOMY;  Surgeon: Moo Pulido III, M.D.;  Location: [facility];  Service: Neurosurgery    CERVICAL DISK AND FUSION ANTERIOR N/A 6/4/2024    Procedure: C4-C7 ANTERIOR CERVICAL DISC AND FUSION;  Surgeon: Moo Pulido III, M.D.;  Location: [facility];  Service: Neurosurgery    FINGER OR HAND INCISION AND  DRAINAGE Right 6/12/2023    Procedure: INCISION AND DRAINAGE, HAND - THUMB;  Surgeon: Ace Shaw M.D.;  Location: SURGERY [facility];  Service: Orthopedics    VT UPPER GI ENDOSCOPY,DIAGNOSIS N/A 3/14/2023    Procedure: GASTROSCOPY;  Surgeon: Atilio Freed M.D.;  Location: SURGERY [facility];  Service: Gastroenterology    OTHER      appy, lumbar fusion       FAMILY HISTORY  Family History   Problem Relation Age of Onset    Heart Disease Father        SOCIAL HISTORY   reports that he has never smoked. He has never used smokeless tobacco. He reports that he does not currently use alcohol. He reports that he does not currently use drugs.    CURRENT MEDICATIONS  Previous Medications    ACETAMINOPHEN (TYLENOL) 325 MG TAB    Take 2 Tablets by mouth every 6 hours as needed for Mild Pain.    ALCOHOL SWABS    Wipe site with prep pad prior to injection.    ASPIRIN 81 MG EC TABLET    Take 81 mg by mouth every day.    BLOOD GLUCOSE MONITORING SUPPL (BLOOD GLUCOSE MONITOR SYSTEM) W/DEVICE KIT    Test blood sugar as recommended by provider.    CONTINUOUS GLUCOSE  (FREESTYLE VALERIE 14 DAY READER) DEVICE    To use with freestyle valerie sensor    CONTINUOUS GLUCOSE SENSOR (FREESTYLE VALERIE 14 DAY SENSOR) MISC    1 Each every 14 days.    CYCLOBENZAPRINE (FLEXERIL) 5 MG TABLET    Take 1 Tablet by mouth 3 times a day as needed for Moderate Pain.    GABAPENTIN (NEURONTIN) 100 MG CAP    Take 1 Capsule by mouth 3 times a day.    GLUCOSE BLOOD (ONETOUCH ULTRA) STRIP    Use 1 Strip for Blood glucose checks    IBUPROFEN (MOTRIN) 600 MG TAB    Take 1 Tablet by mouth every 6 hours as needed for Mild Pain.    INSULIN ASPART (NOVOLOG FLEXPEN) 100 UNIT/ML INJECTION PEN    Inject 3-14 Units under the skin 3 times a day before meals. For glucose:  70 - 150 mg/dL = 0 Units  151 - 200 mg/dL = 3 Units  201 - 250 mg/dL = 4 Units  251 - 300 mg/dL = 7 Units  301 - 350 mg/dL = 10 Units  351 - 400 mg/dL = 12 Units  Over 400 mg/dL  = 14  PAST MEDICAL HISTORY:  No pertinent past medical history     No pertinent past medical history      Units    INSULIN GLARGINE (LANTUS SOLOSTAR) 100 UNIT/ML SOLUTION PEN-INJECTOR INJECTION    Inject 20 Units under the skin every evening.    INSULIN PEN NEEDLE 32 G X 4 MM    Use one pen needle in pen device to inject insulin four times daily.    LANCETS    Use one lancet to test blood sugar three times daily before meals.    LOPERAMIDE (IMODIUM) 2 MG CAP    Take 1 Capsule by mouth 4 times a day as needed for Diarrhea.    OMEPRAZOLE (PRILOSEC) 20 MG DELAYED-RELEASE CAPSULE    Take 1 Capsule by mouth every day.    PROMETHAZINE (PHENERGAN) 12.5 MG TABLET    Take 1-2 Tablets by mouth every four hours as needed for Nausea/Vomiting (if no relief from ondansetron or if ondansetron is not ordered).       ALLERGIES  Ketamine    PHYSICAL EXAM  VITAL SIGNS: /76   Pulse 100   Temp 36.9 °C (98.4 °F) (Temporal)   Resp 16   Wt 77.1 kg (170 lb)   SpO2 96%   BMI 23.06 kg/m²   Pulse ox interpretation: I interpret this pulse ox as normal.  Constitutional: Alert. Sleeping comfortably, no signs of distress  HENT: No signs of trauma, Bilateral external ears normal, Nose normal.   Eyes: Conjunctiva normal, Non-icteric.   Neck: Normal range of motion, Supple, No stridor.   Lymphatic: No lymphadenopathy noted.   Cardiovascular: Regular rate and rhythm, no murmurs.   Thorax & Lungs: Normal breath sounds, No respiratory distress, No wheezing  Abdomen: Bowel sounds normal, Soft, No masses, No pulsatile masses. No peritoneal signs. Minimal epigastric tenderness  Skin: Warm, Dry, No erythema, No rash.   Back: No midline bony tenderness.  Extremities: Intact distal pulses, No edema, No cyanosis.  Musculoskeletal: Good range of motion in all major joints. No or major deformities noted.   Neurologic: Alert , Normal motor function, Normal sensory function, No focal deficits noted.   Psychiatric: Affect normal, Judgment normal, Mood normal. No mention of depression or SI    DIAGNOSTIC STUDIES    Labs:   Labs Reviewed   CBC WITH  DIFFERENTIAL - Abnormal; Notable for the following components:       Result Value    WBC 14.2 (*)     RBC 4.18 (*)     Hemoglobin 11.1 (*)     Hematocrit 33.9 (*)     MCV 81.1 (*)     MCH 26.6 (*)     Neutrophils-Polys 87.30 (*)     Lymphocytes 6.80 (*)     Neutrophils (Absolute) 12.41 (*)     Lymphs (Absolute) 0.96 (*)     All other components within normal limits   COMP METABOLIC PANEL - Abnormal; Notable for the following components:    Sodium 132 (*)     Chloride 92 (*)     Anion Gap 18.0 (*)     Glucose 459 (*)     Bun 31 (*)     AST(SGOT) 11 (*)     All other components within normal limits   LIPASE   ESTIMATED GFR     Radiology:   The attending emergency physician has independently interpreted the diagnostic imaging associated with this visit and am waiting the final reading from the radiologist.     Preliminary interpretation is a follows: Clear chest x-ray    Radiologist interpretation:   DX-CHEST-LIMITED (1 VIEW)   Final Result         No acute cardiopulmonary abnormalities are identified.          COURSE & MEDICAL DECISION MAKING     INITIAL ASSESSMENT, COURSE AND PLAN  Care Narrative:     5:18 PM Patient presents to the ED with abdominal pain. Patient evaluated at bedside and discussed plan of care, including labs and imaging for evaluation. Patient will be treated with Ofirmev 1,000 mg. Ordered for DX-chest, UA, lipase, CMP, and CBC w diff to evaluate his symptoms. Differential diagnoses include but not limited to: hyperglycemia DKA, dehydration, electrolyte abnormality with large component of medication noncompliance    5:32 PM - I discussed the patient's case and the above findings with case management to make sure his home health is active. Case management sees a home health referral in September from PCP.    8:20 PM - bedside nurse reported hypoxia in 60s with good wave form.  This happens every time the patient falls asleep or even nearly asleep.  The patient returned to 90s when awake. This,  combined with white blood cell count of 14 is concerning for respiratory infection, PE, or CHF even though his chest x-ray is normal. Will admit to hospital service.  CT pulmonary angiogram ordered, but will be delayed by a number of hours due to emergency department volumes.  Patient will meanwhile be kept for close observation and supplemental oxygen.    8:35 PM - I discussed the patient's case and the above findings with Dr. Bailon (Hospitalist) who agrees to evaluate the patient for hospitalization.      DISPOSITION AND DISCUSSIONS  I have discussed management of the patient with the following physicians and MICHELLE's: Dr. aBilon (Hospitalist)      DISPOSITION:    Patient will be hospitalized by Dr. Bailon (Hospitalist) in guarded condition.    DISPOSITION:  Patient will be discharged home in stable condition.    FOLLOW UP:  FABBY Tariq.P.R.N.  [id number] [address] [address]  [phone]    Schedule an appointment as soon as possible for a visit       FINAL DIAGNOSIS  1. Hypoxia    2. Hyperglycemia    3. Non compliance w medication regimen    4. Community acquired pneumonia, unspecified laterality         Wild SPEAR M.D. (Scribkali), am scribing for, and in the presence of, Wild Navarro M.D..    Electronically signed by: Wild Navarro M.D. (Dolly), 12/6/2024    Wild SPEAR M.D. personally performed the services described in this documentation, as scribed by Wild Navarro M.D. in my presence, and it is both accurate and complete.

## 2024-12-07 NOTE — DISCHARGE PLANNING
Alert Team Note: LCSW met with pt at bedside. He mentioned not taking his insulin recently due to waiting for [employer] HHRN to get started in the home to assist with his new self-monitoring diabetes system, which is already at his house. He described difficulties with managing his sliding scale as difficult to understand.     Pt verified his PCP, YESENIA Cervantes through [facility], but mentioned she works limited days and the difficulty getting into see her.     We talked about mental health community resources, including [facility] and the services offered at this agency, which include psychiatry, therapy and other case management services. We discussed calling to make an appointment on Monday.

## 2024-12-07 NOTE — ASSESSMENT & PLAN NOTE
CTA chest revealed T12 fracture - likely chronic in nature  - Continue pain management  - Continue lidocaine  - avoid dilaudid  - oxycodone for breakthrough, however will likely discontinue once for sure etiology is gastric

## 2024-12-07 NOTE — NON-PROVIDER
This is a medical student note, intended for learning and teaching purposes only. Please see resident note for complete plan of care.      [employer] Internal Medicine Daily Progress Note    [employer] Team: Internal Medicine Esvin/Dany  Attending: Dashawn Dobson M.D.  Student: Lilia Rick, MS3  Patient Christoph Taylor; [id number]; 1965    Chief Complaint  Intractable abdominal pain, weakness, and SI    History of Presenting Illness  Christoph Taylor is a 59 y.o. male with a PMH of poorly controlled diabetes, previous hospitalizations for DKA, and esophagitis admitted 12/6 for non-DKA hyperglycemia, intractable abdominal pain, and suicidal ideation without intent.    Pt was recently hospitalized for his abdominal pain and discharged 12/5. Pt reports an EGD in 2023 with the finding of esophagitis and started on Prilosec 20 mg. After returning home he reports not taking his insulin or prilosec and becoming weak, nauseous, with vomiting, diarrhea, and intense abdominal pain. He reports the abdominal pain originating under his L rib cage in the LUQ, characterized as sharp, occurs post-prandial, with associated acid reflux and nausea. This abdominal pain has been occurring for the past 5 months and he reports the prilosec does not help although he has been non-compliant. Pt claims he has not received diabetic education and is scared to take his insulin following an episode of hypoglycemia during a previous hospitalization. Pt reports being unable to walk currently and may require PT/OT. Bladder scan following admission demonstrated 1 L urinary retention and a jiménez was placed. Pt reports worsening urine stream and dribbling.     Pt reports feeling hopelessness about his health issues but has no intent or plan for self-harm. He is currently on a legal-hold with a sitter.    Review of Symptoms  General: Losing weight, reports low appetite and weakness  HEENT: No headaches, no sinus congestion, no sore throat.  Respiratory: No cough, no  shortness of breath, no wheezing.  Cardiovascular: No chest pain, no palpitations, no edema.  Gastrointestinal:See HPI.  Musculoskeletal: No joint pain or stiffness.  Neurological: No dizziness, no changes in sensation. Reports confusion with insulin regimen.  Skin: No rashes or lesions.    Past Medical History  Past Medical History:   Diagnosis Date    Abnormal electrocardiogram (ECG) (EKG) 11/08/2021    Acute cystitis without hematuria 01/26/2024    8/10 positive urinalysis, with associated hyperglycemia, will start antibiotics, follow-up cultures, initial culture with UA on August 8 was negative      Acute esophagitis 07/28/2022    Acute hypoxic respiratory failure (HCC) 07/22/2024    Acute metabolic encephalopathy 06/09/2023    Acute on chronic anemia 05/06/2024    Acute on chronic respiratory failure (HCC) 05/20/2024    Acute respiratory failure with hypoxia ([facility]) 07/25/2024    Acute superficial gastritis without hemorrhage 05/21/2024    Agitation 06/09/2024    KRISTAL (acute kidney injury) ([facility]) 11/08/2021    AP (abdominal pain) 06/08/2023    Chest pain in adult 11/08/2021    Chronic renal insufficiency 06/04/2024    CKD (chronic kidney disease) stage 3, GFR 30-59 ml/min 11/07/2021    Diabetes ([facility])     Diabetic ketoacidosis without coma ([facility]) 11/07/2021    Diabetic ketoacidosis without coma associated with type 2 diabetes mellitus ([facility]) 11/07/2021    Diarrhea 03/18/2022    DKA, type 1, not at goal ([facility]) 07/28/2022    Elevated troponin 06/10/2023    Esophagitis 07/28/2022    [place] (hard of hearing)     Very [place] No hearing aides    Hypercholesteremia     Impaction of intestine ([facility]) 05/21/2024    Intractable left upper quadrant abdominal pain 05/21/2024    Leukocytosis 05/20/2024    Metabolic acidosis, increased anion gap 11/07/2021    Syncope 06/09/2023    Transaminitis 06/12/2024       Surgical History  Past Surgical History:   Procedure Laterality Date    FUSION, SPINE, LUMBAR, PLIF N/A 6/7/2024    Procedure:  FUSION, SPINE, LUMBAR, PLIF- REDO LUMBAR 4-5 LAMINECTOMY WITH L4-5 STEALTH FUSION;  Surgeon: Moo Pulido III, M.D.;  Location: SURGERY [facility];  Service: Neurosurgery    LUMBAR LAMINECTOMY DISKECTOMY N/A 6/7/2024    Procedure: LAMINECTOMY, SPINE, LUMBAR, WITH DISCECTOMY;  Surgeon: Moo Pulido III, M.D.;  Location: SURGERY [facility];  Service: Neurosurgery    CERVICAL DISK AND FUSION ANTERIOR N/A 6/4/2024    Procedure: C4-C7 ANTERIOR CERVICAL DISC AND FUSION;  Surgeon: Moo Pulido III, M.D.;  Location: SURGERY [facility];  Service: Neurosurgery    FINGER OR HAND INCISION AND DRAINAGE Right 6/12/2023    Procedure: INCISION AND DRAINAGE, HAND - THUMB;  Surgeon: Ace Shaw M.D.;  Location: SURGERY [facility];  Service: Orthopedics    DC UPPER GI ENDOSCOPY,DIAGNOSIS N/A 3/14/2023    Procedure: GASTROSCOPY;  Surgeon: Atilio Freed M.D.;  Location: SURGERY [facility];  Service: Gastroenterology    OTHER      appy, lumbar fusion       Family History  Family History   Problem Relation Age of Onset    Heart Disease Father      History of breast cancer on maternal side.  History of lung cancer (secondary to tobacco use) on paternal side.     Social History  Social History     Tobacco Use    Smoking status: Never    Smokeless tobacco: Never   Substance Use Topics    Alcohol use: Not Currently    Drug use: Not Currently     Smoking history: 1 PPD for 5 years, quit in 2000  Rare ETOH use.  History of marijuana use in 20s, no current recreational drug use.     Allergies  Allergies   Allergen Reactions    Ketamine Unspecified     aggressive       Medications  Current Outpatient Medications   Medication Instructions    acetaminophen (TYLENOL) 650 mg, Oral, EVERY 6 HOURS PRN    Alcohol Swabs Wipe site with prep pad prior to injection.    aspirin 81 mg, Oral, DAILY    Blood Glucose Monitoring Suppl (BLOOD GLUCOSE MONITOR SYSTEM) w/Device Kit Test blood sugar as recommended by provider.    Continuous  Glucose  (FREESTYLE MAITE 14 DAY READER) Device To use with freestyle maite sensor    Continuous Glucose Sensor (FREESTYLE MAITE 14 DAY SENSOR) Misc 1 Each, Does not apply, EVERY 14 DAYS    cyclobenzaprine (FLEXERIL) 5 mg, Oral, 3 TIMES DAILY PRN    gabapentin (NEURONTIN) 100 mg, Oral, 3 TIMES DAILY    glucose blood (ONETOUCH ULTRA) strip Use 1 Strip for Blood glucose checks    ibuprofen (MOTRIN) 600 mg, Oral, EVERY 6 HOURS PRN    insulin aspart (NOVOLOG FLEXPEN) 100 UNIT/ML injection PEN Inject 3-14 Units under the skin 3 times a day before meals. For glucose:  70 - 150 mg/dL = 0 Units  151 - 200 mg/dL = 3 Units  201 - 250 mg/dL = 4 Units  251 - 300 mg/dL = 7 Units  301 - 350 mg/dL = 10 Units  351 - 400 mg/dL = 12 Units  Over 400 mg/dL  = 14 Units    Insulin Pen Needle 32 G x 4 mm Use one pen needle in pen device to inject insulin four times daily.    Lancets Use one lancet to test blood sugar three times daily before meals.    Lantus SoloStar 20 Units, Subcutaneous, EVERY EVENING    loperamide (IMODIUM) 2 mg, Oral, 4 TIMES DAILY PRN    omeprazole (PRILOSEC) 20 mg, Oral, DAILY    promethazine (PHENERGAN) 12.5-25 mg, Oral, EVERY 4 HOURS PRN     Social History:    - 1 PPD smoking history for 5 years, quit in 2000, no current use  - rare ETOH use currently, drank heavily in 20s  - no current recreational drug use, reports moderate marijuana use in 20s  - retired currently   - lives alone in a home with social support including his dog and next door neighbor, family is located in [place]. Pt claims he is unable to move to be closer to family.      Physical Exam  Vitals:    12/06/24 2102 12/06/24 2209 12/06/24 2337 12/07/24 0400   BP: (!) 165/93 (!) 158/101  112/58   Pulse: 100 99  70   Resp: 19 18  18   Temp:  36.4 °C (97.5 °F)  36.4 °C (97.5 °F)   TempSrc:  Temporal  Temporal   SpO2: 98% 97%  96%   Weight:  77.5 kg (170 lb 13.7 oz) 77.5 kg (170 lb 13.7 oz) 77.6 kg (171 lb 1.2 oz)   Height:  1.829 m (6'  "0.01\")         Intake/Output Summary (Last 24 hours) at 12/7/2024 0817  Last data filed at 12/7/2024 0400  Gross per 24 hour   Intake 0 ml   Output 1600 ml   Net -1600 ml         General: Patient is not in acute distress, non-toxic appearing, teary and emotional.   HEENT:  Head: Normocephalic, atraumatic.  MMM  Cardiovascular: Regular rate and rhythm, no murmurs, rubs, or gallops. Peripheral pulses intact.  Respiratory: Lungs clear to auscultation bilaterally, no wheezes, rales, or rhonchi. Equal breath sounds.  Abdomen: Soft, tender to palpation in the LUQ, non-distended.  Extremities: No edema, full range of motion, normal strength. Capillary refill < 2 seconds.   Bilat LE appears to be atrophic with L sided arch collapse from chronic injury.    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 No rashes or lesions noted.  Neurological: Alert and oriented x3. Cranial nerves II-XII grossly intact. Strength 5/5 in all extremities. Sensation intact.   Clock-drawing test was limited given pt had recently received Dilaudid, score= 0, will re-evaluate in the AM.     Imaging  CTA chest= negative for PE  CXR= negative for ongoing cardiopulmonary process      Assessment/Plan  Christoph Taylor is a 58 YO M admitted 12/6 for intractable abdominal pain, hyperglycemia (non-DKA), and SI.     # Intractable Abdominal pain, likely secondary to gastritis (present on admission), status: stable  5-month history of LUQ abdominal pain associated with meals and relieved with GI cocktail this AM. Pt is non-compliant with home Prilosec 20 mg QDAY. No associated CP or SOB to indicate cardiac cause, EKG is SR with no signs of ischemic changes. EGD 2023 shows evidence of esophagitis and pt is non-compliant with treatment plan. Lipase in ED WNL at 12, UA negative LE and Nitrites. Elevated WBC on admission at 14.2 " since normalized to 10.4, pt remains afebrile.     - continue GI cocktail PRN  - increase Prilosec 20 mg to BID  - DC tele     # Hyperglycemia secondary to medication non-compliance, non-DKA (present on admission), status: improving  FSG on admission 459, currently 203. Pt is on Glargine 20 U bedtime and Aspart 3-14 U, but pt is non-compliant. Claims he is T2DM but chart review also report T1DM.     - c-peptide pending  - diabetic education IP  - continue home insulin regimen  - hypoglycemia precautions in place    # SI (present on admission)  Pt reports hopelessness from chronic health conditions. He reports no intent or plan. Legal hold was placed with sitter in place, psychiatry have decided to extend legal hold until placement.     - appreciate psychiatry recommendations   - await placement following medical clearance    # Acute urinary retention, most likely secondary to neurogenic bladder vs. BPH new-onset, status: stable  Bladder scan 12/6 showed retention of 1 L. Pt reports recent onset of weak urinary stream and dribbling. Crowley was placed following bladder scan.     - initiate Tamsulosin       Ppx:   DVT= Lovenox active  PPI= active  TELE= discontinued       Lilia Rick, Student

## 2024-12-07 NOTE — PROGRESS NOTES
4 Eyes Skin Assessment Completed by PENNIE Marcano and PENNIE Diehl.    Head WDL  Ears WDL  Nose WDL  Mouth WDL  Neck WDL  Breast/Chest Redness and Blanching  Shoulder Blades WDL  Spine WDL  (R) Arm/Elbow/Hand WDL  (L) Arm/Elbow/Hand Blister to left thumb  Abdomen WDL  Groin WDL  Scrotum/Coccyx/Buttocks Redness and Blanching  (R) Leg WDL  (L) Leg WDL  (R) Heel/Foot/Toe Discoloration to second toe nail  (L) Heel/Foot/Toe WDL          Devices In Places Nasal Cannula, Tele Box and Pulse Ox      Interventions In Place Gray Ear Foams, Sacral Mepilex, and Pillows    Possible Skin Injury No    Pictures Uploaded Into Epic Yes  Wound Consult Placed N/A  RN Wound Prevention Protocol Ordered No

## 2024-12-07 NOTE — PROGRESS NOTES
Message sent to Jarvis Rojas MD: Patient reports severe upper left upper quadrant abdominal pain post eating a few bites of breakfast, FYI.

## 2024-12-07 NOTE — ASSESSMENT & PLAN NOTE
Patient presented with acute hypoxic respiratory failure of unknown etiology which is currently resolving.   Chest x-ray done on admission was  negative for pneumonia,CTA of pulmonary artery negative for PE.  Consulting clinic on 7/2024 was normal.  Stress test in October 2024 was normal. He was given ceftriaxone empirically in the ER for pneumonia, however he his procalcitonin was negative. He's currently on 1 L of oxygen while sleeping.  - possibly due to undiagnosed sleep apnea, okay for oxygen while sleeping.  - wean off oxygen while awake  - Outpatient sleep study  - Home O2 eval

## 2024-12-07 NOTE — PROGRESS NOTES
Patient scored as high risk on CSSR scale. Provider notified, high suicide risk order set activated, patient placed on legal 2K hold, moved to private room, all unnecessary equipment removed, and 1:1 sitter now at bedside.

## 2024-12-07 NOTE — ASSESSMENT & PLAN NOTE
Likely, due to desaturations while sleeping  - will need sleep consultation outpatient.   - oxygen while asleep   - titrate sats 92-96% okay

## 2024-12-07 NOTE — ASSESSMENT & PLAN NOTE
Patient complains of intractable left upper quadrant abdominal pain which usually occurs with oral intake. relieved with GI cocktail. He has a hx of severe LA class D esophagitis. Last EGD was done on 3/14/2023. He reports significant reduction in his appetite and weight as well.  EGD was done today on 12/9/2024 which showed;  30-38 cm circumferential white mucosa consistent with LA class D erosive oesophagitis.  38-42 cm hiatal hernia, no pyloric stenosis  Prominent Ampullae in the Duodenum  Per GI recs;  -Continue Omeprazole BID for 8 weeks and then daily indefinitely  -Continue  Sucralfate QID 2 weeks and then stop  -Start on metoclopramide 5mg PO TID   -Continue GI cocktail PRN  -Start on Gastroparesis diet  -Follow up with GI for EGD in 8+ weeks for healing of esophagitis and no presence of Pereira's esophagus.

## 2024-12-07 NOTE — ED NOTES
Bedside report received from off going RN: Madhuri, assumed care of patient.  Pt sleeping, aroused to voice. Pt is pale. Pt goes back to sleep.       Fall risk interventions in place: Patient's personal possessions are with in their safe reach and Place fall risk sign on patient's door (all applicable per [facility] Fall risk assessment)   Continuous monitoring: Cardiac Leads, Pulse Ox, or Blood Pressure  IVF/IV medications: Infusion per MAR (List Med(s)) LR  Oxygen: How many liters 2L, Traced the line to wall oxygen, and No oxygen tank in room  Bedside sitter: Not Applicable   Isolation: Not Applicable

## 2024-12-07 NOTE — ASSESSMENT & PLAN NOTE
Depressive mood In context of several medical conditions and disabilities impacting quality of life  - psych following  - Legal hold lifted by psych today  - Start Effexor (venlafaxine) per psych  - Inpatient psych transfer once medically cleared

## 2024-12-08 ENCOUNTER — ANESTHESIA EVENT (OUTPATIENT)
Dept: SURGERY | Facility: MEDICAL CENTER | Age: 59
DRG: 380 | End: 2024-12-08
Payer: COMMERCIAL

## 2024-12-08 LAB
ALBUMIN SERPL BCP-MCNC: 2.9 G/DL (ref 3.2–4.9)
ALBUMIN SERPL BCP-MCNC: 2.9 G/DL (ref 3.2–4.9)
ALBUMIN/GLOB SERPL: 1.3 G/DL
ALP SERPL-CCNC: 66 U/L (ref 30–99)
ALT SERPL-CCNC: <5 U/L (ref 2–50)
ANION GAP SERPL CALC-SCNC: 11 MMOL/L (ref 7–16)
AST SERPL-CCNC: 12 U/L (ref 12–45)
BILIRUB SERPL-MCNC: <0.2 MG/DL (ref 0.1–1.5)
BUN SERPL-MCNC: 21 MG/DL (ref 8–22)
BUN SERPL-MCNC: 21 MG/DL (ref 8–22)
CALCIUM ALBUM COR SERPL-MCNC: 8.9 MG/DL (ref 8.5–10.5)
CALCIUM ALBUM COR SERPL-MCNC: 9 MG/DL (ref 8.5–10.5)
CALCIUM SERPL-MCNC: 8 MG/DL (ref 8.5–10.5)
CALCIUM SERPL-MCNC: 8.1 MG/DL (ref 8.5–10.5)
CHLORIDE SERPL-SCNC: 100 MMOL/L (ref 96–112)
CHLORIDE SERPL-SCNC: 100 MMOL/L (ref 96–112)
CO2 SERPL-SCNC: 26 MMOL/L (ref 20–33)
CO2 SERPL-SCNC: 29 MMOL/L (ref 20–33)
CREAT SERPL-MCNC: 0.84 MG/DL (ref 0.5–1.4)
CREAT SERPL-MCNC: 0.89 MG/DL (ref 0.5–1.4)
ERYTHROCYTE [DISTWIDTH] IN BLOOD BY AUTOMATED COUNT: 39.1 FL (ref 35.9–50)
GFR SERPLBLD CREATININE-BSD FMLA CKD-EPI: 100 ML/MIN/1.73 M 2
GFR SERPLBLD CREATININE-BSD FMLA CKD-EPI: 98 ML/MIN/1.73 M 2
GLOBULIN SER CALC-MCNC: 2.3 G/DL (ref 1.9–3.5)
GLUCOSE BLD STRIP.AUTO-MCNC: 180 MG/DL (ref 65–99)
GLUCOSE BLD STRIP.AUTO-MCNC: 192 MG/DL (ref 65–99)
GLUCOSE BLD STRIP.AUTO-MCNC: 239 MG/DL (ref 65–99)
GLUCOSE BLD STRIP.AUTO-MCNC: 251 MG/DL (ref 65–99)
GLUCOSE SERPL-MCNC: 183 MG/DL (ref 65–99)
GLUCOSE SERPL-MCNC: 188 MG/DL (ref 65–99)
HCT VFR BLD AUTO: 29.1 % (ref 42–52)
HGB BLD-MCNC: 9.4 G/DL (ref 14–18)
MCH RBC QN AUTO: 26 PG (ref 27–33)
MCHC RBC AUTO-ENTMCNC: 32.3 G/DL (ref 32.3–36.5)
MCV RBC AUTO: 80.4 FL (ref 81.4–97.8)
PHOSPHATE SERPL-MCNC: 1.9 MG/DL (ref 2.5–4.5)
PLATELET # BLD AUTO: 206 K/UL (ref 164–446)
PMV BLD AUTO: 9.3 FL (ref 9–12.9)
POTASSIUM SERPL-SCNC: 3.7 MMOL/L (ref 3.6–5.5)
POTASSIUM SERPL-SCNC: 3.8 MMOL/L (ref 3.6–5.5)
PROT SERPL-MCNC: 5.2 G/DL (ref 6–8.2)
RBC # BLD AUTO: 3.62 M/UL (ref 4.7–6.1)
SODIUM SERPL-SCNC: 136 MMOL/L (ref 135–145)
SODIUM SERPL-SCNC: 137 MMOL/L (ref 135–145)
WBC # BLD AUTO: 6 K/UL (ref 4.8–10.8)

## 2024-12-08 PROCEDURE — 770001 HCHG ROOM/CARE - MED/SURG/GYN PRIV*

## 2024-12-08 PROCEDURE — A9270 NON-COVERED ITEM OR SERVICE: HCPCS

## 2024-12-08 PROCEDURE — 36415 COLL VENOUS BLD VENIPUNCTURE: CPT

## 2024-12-08 PROCEDURE — 85027 COMPLETE CBC AUTOMATED: CPT

## 2024-12-08 PROCEDURE — 82962 GLUCOSE BLOOD TEST: CPT

## 2024-12-08 PROCEDURE — A9270 NON-COVERED ITEM OR SERVICE: HCPCS | Performed by: INTERNAL MEDICINE

## 2024-12-08 PROCEDURE — 80069 RENAL FUNCTION PANEL: CPT

## 2024-12-08 PROCEDURE — 700101 HCHG RX REV CODE 250

## 2024-12-08 PROCEDURE — 99222 1ST HOSP IP/OBS MODERATE 55: CPT | Performed by: INTERNAL MEDICINE

## 2024-12-08 PROCEDURE — 99232 SBSQ HOSP IP/OBS MODERATE 35: CPT | Performed by: INTERNAL MEDICINE

## 2024-12-08 PROCEDURE — 700102 HCHG RX REV CODE 250 W/ 637 OVERRIDE(OP): Performed by: INTERNAL MEDICINE

## 2024-12-08 PROCEDURE — 700102 HCHG RX REV CODE 250 W/ 637 OVERRIDE(OP)

## 2024-12-08 PROCEDURE — 700111 HCHG RX REV CODE 636 W/ 250 OVERRIDE (IP): Mod: JZ | Performed by: INTERNAL MEDICINE

## 2024-12-08 PROCEDURE — 80053 COMPREHEN METABOLIC PANEL: CPT

## 2024-12-08 PROCEDURE — 700105 HCHG RX REV CODE 258

## 2024-12-08 RX ORDER — SODIUM CHLORIDE 9 MG/ML
INJECTION, SOLUTION INTRAVENOUS CONTINUOUS
Status: DISCONTINUED | OUTPATIENT
Start: 2024-12-08 | End: 2024-12-09

## 2024-12-08 RX ADMIN — INSULIN LISPRO 3 UNITS: 100 INJECTION, SOLUTION INTRAVENOUS; SUBCUTANEOUS at 10:10

## 2024-12-08 RX ADMIN — SUCRALFATE ORAL 1 G: 1 SUSPENSION ORAL at 10:13

## 2024-12-08 RX ADMIN — LIDOCAINE 1 PATCH: 560 PATCH PERCUTANEOUS; TOPICAL; TRANSDERMAL at 13:26

## 2024-12-08 RX ADMIN — ENOXAPARIN SODIUM 40 MG: 100 INJECTION SUBCUTANEOUS at 17:06

## 2024-12-08 RX ADMIN — GABAPENTIN 100 MG: 100 CAPSULE ORAL at 04:26

## 2024-12-08 RX ADMIN — SENNOSIDES AND DOCUSATE SODIUM 2 TABLET: 50; 8.6 TABLET ORAL at 17:05

## 2024-12-08 RX ADMIN — OXYCODONE 5 MG: 5 TABLET ORAL at 13:26

## 2024-12-08 RX ADMIN — DIBASIC SODIUM PHOSPHATE, MONOBASIC POTASSIUM PHOSPHATE AND MONOBASIC SODIUM PHOSPHATE 250 MG: 852; 155; 130 TABLET ORAL at 10:13

## 2024-12-08 RX ADMIN — GABAPENTIN 100 MG: 100 CAPSULE ORAL at 21:38

## 2024-12-08 RX ADMIN — OXYCODONE 5 MG: 5 TABLET ORAL at 10:12

## 2024-12-08 RX ADMIN — INSULIN LISPRO 4 UNITS: 100 INJECTION, SOLUTION INTRAVENOUS; SUBCUTANEOUS at 21:38

## 2024-12-08 RX ADMIN — SUCRALFATE ORAL 1 G: 1 SUSPENSION ORAL at 13:26

## 2024-12-08 RX ADMIN — FAMOTIDINE 20 MG: 10 INJECTION, SOLUTION INTRAVENOUS at 04:26

## 2024-12-08 RX ADMIN — OXYCODONE 5 MG: 5 TABLET ORAL at 00:10

## 2024-12-08 RX ADMIN — INSULIN LISPRO 3 UNITS: 100 INJECTION, SOLUTION INTRAVENOUS; SUBCUTANEOUS at 14:14

## 2024-12-08 RX ADMIN — GABAPENTIN 100 MG: 100 CAPSULE ORAL at 13:25

## 2024-12-08 RX ADMIN — OMEPRAZOLE 40 MG: 20 CAPSULE, DELAYED RELEASE ORAL at 17:06

## 2024-12-08 RX ADMIN — INSULIN GLARGINE-YFGN 20 UNITS: 100 INJECTION, SOLUTION SUBCUTANEOUS at 19:20

## 2024-12-08 RX ADMIN — SODIUM CHLORIDE: 9 INJECTION, SOLUTION INTRAVENOUS at 17:57

## 2024-12-08 RX ADMIN — TAMSULOSIN HYDROCHLORIDE 0.4 MG: 0.4 CAPSULE ORAL at 10:14

## 2024-12-08 RX ADMIN — INSULIN LISPRO 7 UNITS: 100 INJECTION, SOLUTION INTRAVENOUS; SUBCUTANEOUS at 19:19

## 2024-12-08 RX ADMIN — ASPIRIN 81 MG: 81 TABLET, COATED ORAL at 04:26

## 2024-12-08 RX ADMIN — OMEPRAZOLE 40 MG: 20 CAPSULE, DELAYED RELEASE ORAL at 04:25

## 2024-12-08 RX ADMIN — LIDOCAINE HYDROCHLORIDE 30 ML: 20 SOLUTION OROPHARYNGEAL at 21:38

## 2024-12-08 ASSESSMENT — ENCOUNTER SYMPTOMS
VOMITING: 0
CONSTIPATION: 0
HEMOPTYSIS: 0
PALPITATIONS: 0
DIARRHEA: 0
ABDOMINAL PAIN: 1
BLURRED VISION: 0
NAUSEA: 1
CARDIOVASCULAR NEGATIVE: 1
NEUROLOGICAL NEGATIVE: 1
EYES NEGATIVE: 1
CHILLS: 0
MUSCULOSKELETAL NEGATIVE: 1
CONSTITUTIONAL NEGATIVE: 1
SHORTNESS OF BREATH: 0
COUGH: 0
VOMITING: 1
DIZZINESS: 0
DEPRESSION: 0
RESPIRATORY NEGATIVE: 1
FEVER: 0
HEADACHES: 0
DEPRESSION: 1
MYALGIAS: 0
WEIGHT LOSS: 1
HEARTBURN: 1

## 2024-12-08 ASSESSMENT — PAIN DESCRIPTION - PAIN TYPE
TYPE: ACUTE PAIN

## 2024-12-08 NOTE — HOSPITAL COURSE
Mr. Taylor is a 59 y.o male patient with PMHx of  type 2 diabetes mellitus with neuropathy (HbA1C: 13), CKD, HLD, chronic neck and lower back pain with possible upper limb neuropathy and previous hospitalization for DKA and espphagitis who presents via [facility] for abdominal pain, elevated glucose and suicidal ideations. He was admitted 12/6/2024 for intractable abdominal pain, suicidal ideations, and uncontrolled type 2 diabetes mellitus. He was recently seen in ED on 12/5 for similar abdominal pain, and sent home when no acute abnormalities were detected.      On admission his abdominal pain is located to the same spot, left upper quadrant under his ribs. Symptomatologies are in keeping with likely peptic ulcer disease as the pain is trigger by oral intake, and relieved with anti-acids. His poorly controlled diabetes is a result of noncompliance which is due to poor understanding and fear of insulin therapy (which had previously resulted in reported cardiac arrest). Diabetic education has been consulted.     During hospitalization patient expressed suicidal thoughts, in the context of severe depression and hopelessness given all his medical conditions he is trying to juggle. Legal hold was placed and recommendation by psychiatry to start Venlafaxine for mood and transfer to inpatient psychiatric hospital when medically cleared.

## 2024-12-08 NOTE — NON-PROVIDER
This is a medical student note, intended for learning and teaching purposes only. Please see resident note for complete plan of care.    [facility] [place] [facility] [place]      Attending: Dashawn Dobson M.D.  Student: Lilia Rick, MS3  Patient: Christoph Taylor; [id number]; 1965    Chief Complaint   Intractable abdominal pain, weakness, and SI     Hospital Course  Christoph Taylor is a 59 y.o. male with a PMH of poorly controlled diabetes, previous hospitalizations for DKA, and esophagitis admitted 12/6 for non-DKA hyperglycemia, intractable abdominal pain, and suicidal ideation without intent.       Interval Problem Update  12/7:  - Psychiatry consulted and recommended extension of legal-hold, initiation of Effexor 37.5 mg, transfer to inpatient psychiatric hospital upon medical clearance, and B12/Vit D to W/U MDD.   - Dilaudid Dc'd due to hypersomnolence  - initiation of Omeprazole 40 mg PO, Famotidine 20 mg IV, and Tamsulosin 0.4 mg PO    Pt this AM reports improved gastric pain rated 2/10 (as opposed to 8/10 yesterday). He is still requiring Roxicodone 5 mg for breakthrough pain. Pt was titrated up to 5 L humidified O2 overnight for sats in the 70s, concern for WILLIAM with reported apneic episodes/snoring from both nursing and the sitter. Pt denies history of WILLIAM or sleep study and refused CPAP overnight. Pt remains nauseous but denies emesis. Last BM was 4 days ago. Pt denies dysphagia.     Consultants/Specialty  Psychiatry completed  PT/OT pending     Review of Systems  General: Losing weight, reports low appetite and weakness  HEENT: No headaches, no sinus congestion, no sore throat.  Respiratory: No cough, no shortness of breath, no wheezing.  Cardiovascular: No chest pain, no palpitations, no edema.  Gastrointestinal:See HPI.  Musculoskeletal: No joint pain or stiffness.  Neurological: No dizziness, no changes in sensation. Reports confusion with insulin regimen.  Skin: No rashes or  lesions.      Vitals  Vitals:    12/07/24 0924 12/07/24 1554 12/07/24 1911 12/08/24 0353   BP: 124/82 113/70 126/78 112/64   Pulse: 89 94 96    Resp: 16 16 17 18   Temp: 36.7 °C (98.1 °F) 36.5 °C (97.7 °F) 36.9 °C (98.5 °F) 36.5 °C (97.7 °F)   TempSrc: Temporal Temporal Temporal Temporal   SpO2: 99%  97% 99%   Weight:       Height:             Physical Exam  General: Patient is not in acute distress, non-toxic appearing.   HEENT:  Head: Normocephalic, atraumatic.  MMM  Cardiovascular: Regular rate and rhythm, no murmurs, rubs, or gallops. Peripheral pulses intact.  Respiratory: Lungs clear to auscultation bilaterally, no wheezes, rales, or rhonchi. Equal breath sounds.  Abdomen: Soft, tender to palpation in the LUQ, non-distended.  Extremities: No edema, full range of motion, normal strength. Capillary refill < 2 seconds.              Bilat LE appears to be atrophic with L sided arch collapse from chronic injury.               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 No rashes or lesions noted.  Neurological: Alert and oriented x3. Cranial nerves II-XII grossly intact. Strength 5/5 in all extremities. Sensation intact.              Clock-drawing test was limited given pt had recently received Dilaudid, score= 0, will re-evaluate in the AM.    Fluids    Intake/Output Summary (Last 24 hours) at 12/8/2024 0705  Last data filed at 12/8/2024 0400  Gross per 24 hour   Intake 480 ml   Output 1700 ml   Net -1220 ml       Laboratory  None new to discuss.       Imaging/Procedure Review  None new to discuss.       Medications  Current Facility-Administered Medications   Medication Last Admin    phosphorus (K-Phos-Neutral) per tablet 250 mg      omeprazole (PriLOSEC) capsule 40 mg 40 mg at 12/08/24 0425    tamsulosin (Flomax) capsule 0.4 mg 0.4 mg at 12/07/24 0902    lidocaine (Asperflex) 4  % patch 1 Patch 1 Patch at 12/07/24 1412    sucralfate (Carafate) 1 GM/10ML suspension 1 g 1 g at 12/07/24 2036    Respiratory Therapy Consult      enoxaparin (Lovenox) inj 40 mg 40 mg at 12/07/24 1706    acetaminophen (Tylenol) tablet 650 mg      Pharmacy Consult Request ...Pain Management Review 1 Each      oxyCODONE immediate-release (Roxicodone) tablet 2.5 mg      Or    oxyCODONE immediate-release (Roxicodone) tablet 5 mg 5 mg at 12/08/24 0010    senna-docusate (Pericolace Or Senokot S) 8.6-50 MG per tablet 2 Tablet 2 Tablet at 12/07/24 1706    And    polyethylene glycol/lytes (Miralax) Packet 1 Packet      hydrALAZINE (Apresoline) injection 10 mg      ondansetron (Zofran) syringe/vial injection 4 mg      ondansetron (Zofran ODT) dispertab 4 mg      promethazine (Phenergan) tablet 12.5-25 mg      promethazine (Phenergan) suppository 12.5-25 mg      prochlorperazine (Compazine) injection 5-10 mg      famotidine (Pepcid) injection 20 mg 20 mg at 12/08/24 0426    guaiFENesin dextromethorphan (Robitussin DM) 100-10 MG/5ML syrup 10 mL      insulin lispro (HumaLOG,AdmeLOG) subcutaneous injection 3 Units at 12/07/24 2142    And    dextrose 50% (D50W) injection 25 g      aspirin EC tablet 81 mg 81 mg at 12/08/24 0426    cyclobenzaprine (Flexeril) tablet 5 mg      gabapentin (Neurontin) capsule 100 mg 100 mg at 12/08/24 0426    ibuprofen (Motrin) tablet 600 mg      insulin GLARGINE (Lantus,Semglee) injection 20 Units at 12/07/24 1926    loperamide (Imodium) capsule 2 mg         Assessment and Plan    Christoph Taylor is a 60 YO M admitted 12/6 for intractable abdominal pain, hyperglycemia (non-DKA), and SI.        # Acute respiratory failure (HCC) (present on admission), status: stable  Currently on 5 L NC overnight for concern of WILLIAM apneic episodes, saturates above 90% on RA in daytime.   Chest x-ray is negative for pneumonia,CTA of pulmonary artery negative for PE.  Consulting clinic on 7/2024 was normal.  Stress test  in October 2024 was normal   he was given ceftriaxone empirically in the ER, however he has procalcitonin negative     - sleep study for CPAP placement   - spot pulse ox, wean off oxygen      # Intractable Abdominal pain, likely secondary to gastritis (present on admission), status: trending to improvement  5-month history of LUQ abdominal pain associated with meals and relieved with GI cocktail and PPI/H2/Sucralfate therapy. Pt is non-compliant with home Prilosec 20 mg QDAY. No associated CP or SOB to indicate cardiac cause, EKG is SR with no signs of ischemic changes. EGD 2023 shows evidence of esophagitis and pt is non-compliant with treatment plan. Pt was never followed up on for EGD in March of 2023, and reports weight loss with increased pain suggesting possible transformation of esophagitis or risk of malignancy.      - continue GI cocktail PRN  - continue Prilosec 40 mg   - continue Famotidine 20 mg  - continue scheduled Sucralfate [id number]  - DC oxycodone today- (trial without to assess above therapies efficacy)  - consult Gastroenterology for IP EGD      # Anemia  Today's Hgb dropped from 10.7 to 9.4, (baseline around 11-12). MCV 80.4.    - iron studies pending    # Hyperglycemia secondary to medication non-compliance, non-DKA (present on admission), status: resolved  FSG on admission 459, currently 188. Pt is on Glargine 20 U bedtime and Aspart 3-14 U, but pt is non-compliant. Claims he is T2DM but chart review also report T1DM.      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 He reports no intent or plan. Legal hold was placed  with sitter in place, psychiatry have decided to extend legal hold until placement.      - continue Effexor 37.5 mg  - await placement following medical clearance     # Acute urinary retention, most likely secondary to neurogenic bladder vs. BPH new-onset, status: stable  Bladder scan 12/6 showed retention of 1 L. Pt reports recent onset of weak urinary stream and dribbling. Crowley was placed following bladder scan.      - continue Tamsulosin   - voiding trial today    # Leukocytosis- (present on admission), status: resolved  Perhaps it is reactive. WBC 14.2 on admission  The patient empirically received ceftriaxone in ER  Possibly reactive secondary to hyperglycemia or esophagitis.  CT of the abdomen from 12/06 showed possible esophagitis.  Chest x-ray negative for pneumonia. CTA chest negative for PE.   UA neg Nitrites and LE.   Procalcitonin is not elevated.  We will defer antibiotics for now.        Ppx:   DVT= Lovenox active  PPI= active  TELE= discontinued       Lilia Rick, Student

## 2024-12-08 NOTE — CARE PLAN
The patient is Stable - Low risk of patient condition declining or worsening    Shift Goals  Clinical Goals: safety, pain management, increase oral intake  Patient Goals: rest, eat food  Family Goals: vicente    Progress made toward(s) clinical / shift goals:      Problem: Knowledge Deficit - Standard  Goal: Patient and family/care givers will demonstrate understanding of plan of care, disease process/condition, diagnostic tests and medications  Description: Target End Date:  1-3 days or as soon as patient condition allows    Document in Patient Education    1.  Patient and family/caregiver oriented to unit, equipment, visitation policy and means for communicating concern  2.  Complete/review Learning Assessment  3.  Assess knowledge level of disease process/condition, treatment plan, diagnostic tests and medications  4.  Explain disease process/condition, treatment plan, diagnostic tests and medications  Outcome: Progressing  Note: Patient has been educated on plan of care, medications, and safety. No further questions from patient      Problem: Provide Safe Environment  Goal: Suicide environmental safety, protocols, policies, and practices will be implemented  Description: Target End Date:  resolve day 1    1.  Remove objects or personal belongings that may cause harm or injury to self or others  2.  Dietary tray modifications (paperware)  3.  Provide a safe environment  4.  Render close patient supervision by sustaining observation or awareness of the patient at all times  Outcome: Progressing  Note: Patient's room is close to the nurses station, 1:1 sitter, dangerous items removed from room, and patient only wearing hospital clothing      Problem: Pain - Standard  Goal: Alleviation of pain or a reduction in pain to the patient’s comfort goal  Description: Target End Date:  Prior to discharge or change in level of care    Document on Vitals flowsheet    1.  Document pain using the appropriate pain scale per order or  unit policy  2.  Educate and implement non-pharmacologic comfort measures (i.e. relaxation, distraction, massage, cold/heat therapy, etc.)  3.  Pain management medications as ordered  4.  Reassess pain after pain med administration per policy  5.  If opiods administered assess patient's response to pain medication is appropriate per POSS sedation scale  6.  Follow pain management plan developed in collaboration with patient and interdisciplinary team (including palliative care or pain specialists if applicable)  Outcome: Progressing  Note: Patient has been educated on pain scale and medications. Patient has been receiving prn oxy 5mg for pain        Patient is not progressing towards the following goals:

## 2024-12-08 NOTE — CONSULTS
Gastroenterology Initial Consult Note               Author:  Radha Ivan M.D. Date & Time Created: 12/8/2024 1:46 PM       Patient ID:  Name:             Christoph Taylor  YOB: 1965  Age:                 59 y.o.  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  The day prior he reported suicidal ideation but wasn't feeling suicidal upon admission. He tells me he has looked down the barrel of his gun previously.  On admission:  Hgb 11.1, MCV 79.5, glu 459.  Had CTA chest yesterday with finding of T12 compression fracture (has vitamin D deficiency).  On 12/5/24 had CT abd/pel with and distal esophageal wall thickening.      Chart review:  5/9/24: GES Tc 99m sufur colloid:  marked gastroparesis with no emptying after 90 minutes and multiple episodes of JOHANNA within the 90 minutes    3/14/23:  EGD by Dr. Freed ([facility]) for epigastric pain:  severe esophagitis    11/2022 Cologuard negative        Review of Systems:  Review of Systems   Constitutional: Negative.    HENT: Negative.     Eyes: Negative.    Respiratory: Negative.     Cardiovascular: Negative.    Gastrointestinal:  Positive for abdominal pain, nausea and vomiting.   Genitourinary: Negative.    Musculoskeletal: Negative.    Skin: Negative.    Neurological: Negative.    Endo/Heme/Allergies: Negative.    Psychiatric/Behavioral:  Positive for depression and suicidal ideas.              Past Medical History:  Past Medical History:   Diagnosis Date    Abnormal electrocardiogram (ECG) (EKG) 11/08/2021    Acute cystitis without hematuria 01/26/2024    8/10 positive urinalysis, with associated hyperglycemia, will  start antibiotics, follow-up cultures, initial culture with UA on August 8 was negative      Acute esophagitis 07/28/2022    Acute hypoxic respiratory failure (HCC) 07/22/2024    Acute metabolic encephalopathy 06/09/2023    Acute on chronic anemia 05/06/2024    Acute on chronic respiratory failure (HCC) 05/20/2024    Acute respiratory failure with hypoxia (Formerly [facility]) 07/25/2024    Acute superficial gastritis without hemorrhage 05/21/2024    Agitation 06/09/2024    KRISTAL (acute kidney injury) (HCC) 11/08/2021    AP (abdominal pain) 06/08/2023    Chest pain in adult 11/08/2021    Chronic renal insufficiency 06/04/2024    CKD (chronic kidney disease) stage 3, GFR 30-59 ml/min 11/07/2021    Diabetes (Formerly [facility])     Diabetic ketoacidosis without coma (Former[facility]) 11/07/2021    Diabetic ketoacidosis without coma associated with type 2 diabetes mellitus (Formerly [facility]) 11/07/2021    Diarrhea 03/18/2022    DKA, type 1, not at goal (Formerly [facility]) 07/28/2022    Elevated troponin 06/10/2023    Esophagitis 07/28/2022    [place] (hard of hearing)     Very [place] No hearing aides    Hypercholesteremia     Impaction of intestine ([facility]) 05/21/2024    Intractable left upper quadrant abdominal pain 05/21/2024    Leukocytosis 05/20/2024    Metabolic acidosis, increased anion gap 11/07/2021    Syncope 06/09/2023    Transaminitis 06/12/2024     Active Hospital Problems    Diagnosis     Chronic midline low back pain [M54.50, G89.29]     Suicidal ideations [R45.851]     Major depressive disorder [F32.9]     Other lesions of median nerve, bilateral upper limbs [G56.13]     Lesion of ulnar nerve, bilateral upper limbs [G56.23]     Sleep apnea [G47.30]     Acute respiratory failure (Formerly [facility]) [J96.00]     Intractable left upper quadrant abdominal pain [R10.12]     Uncontrolled type 2 diabetes mellitus with hyperglycemia (Former[facility]) [E11.65]     CKD (chronic kidney disease) [N18.9]     Leukocytosis [D72.829]          Past Surgical History:  Past Surgical History:   Procedure Laterality Date     FUSION, SPINE, LUMBAR, PLIF N/A 6/7/2024    Procedure: FUSION, SPINE, LUMBAR, PLIF- REDO LUMBAR 4-5 LAMINECTOMY WITH L4-5 STEALTH FUSION;  Surgeon: Moo Pulido III, M.D.;  Location: SURGERY [facility];  Service: Neurosurgery    LUMBAR LAMINECTOMY DISKECTOMY N/A 6/7/2024    Procedure: LAMINECTOMY, SPINE, LUMBAR, WITH DISCECTOMY;  Surgeon: Moo Pulido III, M.D.;  Location: SURGERY [facility];  Service: Neurosurgery    CERVICAL DISK AND FUSION ANTERIOR N/A 6/4/2024    Procedure: C4-C7 ANTERIOR CERVICAL DISC AND FUSION;  Surgeon: Moo Pulido III, M.D.;  Location: SURGERY [facility];  Service: Neurosurgery    FINGER OR HAND INCISION AND DRAINAGE Right 6/12/2023    Procedure: INCISION AND DRAINAGE, HAND - THUMB;  Surgeon: Ace Shaw M.D.;  Location: SURGERY [facility];  Service: Orthopedics    [place] UPPER GI ENDOSCOPY,DIAGNOSIS N/A 3/14/2023    Procedure: GASTROSCOPY;  Surgeon: Atilio Freed M.D.;  Location: SURGERY [facility];  Service: Gastroenterology    OTHER      appy, lumbar fusion           Hospital Medications:  Current Facility-Administered Medications   Medication Dose Frequency Provider Last Rate Last Admin    omeprazole (PriLOSEC) capsule 40 mg  40 mg DAILY Jarvis Rojas M.D.   40 mg at 12/08/24 0425    tamsulosin (Flomax) capsule 0.4 mg  0.4 mg AFTER BREAKFAST Jarvis Rojas M.D.   0.4 mg at 12/08/24 1014    lidocaine (Asperflex) 4 % patch 1 Patch  1 Patch Q24HR Jarvis Rojas M.D.   1 Patch at 12/08/24 1326    sucralfate (Carafate) 1 GM/10ML suspension 1 g  1 g 4X/DAY ACHS Jarvis Rojas M.D.   1 g at 12/08/24 1326    Respiratory Therapy Consult   Continuous RT Juvenal Bailon M.D.        enoxaparin (Lovenox) inj 40 mg  40 mg DAILY AT 1800 Juvenal Bailon M.D.   40 mg at 12/07/24 1706    acetaminophen (Tylenol) tablet 650 mg  650 mg Q6HRS PRN Juvenal Bailon M.D.        Pharmacy Consult Request ...Pain Management Review 1 Each  1 Each PHARMACY TO DOSE Juvenal Bailon M.D.         oxyCODONE immediate-release (Roxicodone) tablet 2.5 mg  2.5 mg Q3HRS PRN Juvenal Bailon M.D.        Or    oxyCODONE immediate-release (Roxicodone) tablet 5 mg  5 mg Q3HRS PRN Juvenal Bailon M.D.   5 mg at 12/08/24 1326    senna-docusate (Pericolace Or Senokot S) 8.6-50 MG per tablet 2 Tablet  2 Tablet Q EVENING Juvenal Bailon M.D.   2 Tablet at 12/07/24 1706    And    polyethylene glycol/lytes (Miralax) Packet 1 Packet  1 Packet QDAY PRN Juvenal Bailon M.D.        hydrALAZINE (Apresoline) injection 10 mg  10 mg Q4HRS JIMMIE Bailon M.D.        ondansetron (Zofran) syringe/vial injection 4 mg  4 mg Q4HRS JIMMIE Bailon M.D.        ondansetron (Zofran ODT) dispertab 4 mg  4 mg Q4HRS PRN Juvenal Bailon M.D.        promethazine (Phenergan) tablet 12.5-25 mg  12.5-25 mg Q4HRS JIMMIE Bailon M.D.        promethazine (Phenergan) suppository 12.5-25 mg  12.5-25 mg Q4HRS JIMMIE Bailon M.D.        prochlorperazine (Compazine) injection 5-10 mg  5-10 mg Q4HRS PRN Juvenal Balion M.D.        famotidine (Pepcid) injection 20 mg  20 mg BID Juvenal Bailon M.D.   20 mg at 12/08/24 0426    guaiFENesin dextromethorphan (Robitussin DM) 100-10 MG/5ML syrup 10 mL  10 mL Q6HRS PRN Juvenal Bailon M.D.        insulin lispro (HumaLOG,AdmeLOG) subcutaneous injection  3-14 Units 4X/DAY ACHNEO Bailon M.D.   3 Units at 12/08/24 1010    And    dextrose 50% (D50W) injection 25 g  25 g Q15 MIN PRN Juvenal Bailon M.D.        aspirin EC tablet 81 mg  81 mg DAILY Juvenal Bailon M.D.   81 mg at 12/08/24 0426    cyclobenzaprine (Flexeril) tablet 5 mg  5 mg TID PRN Juvenal Bailon M.D.        gabapentin (Neurontin) capsule 100 mg  100 mg TID Juvenal Bailon M.D.   100 mg at 12/08/24 1325    ibuprofen (Motrin) tablet 600 mg  600 mg Q6HRS PRN Juvenal Bailon M.D.        insulin GLARGINE (Lantus,Semglee) injection  20 Units Q EVENING Juvenal Bailon M.D.   20 Units at 12/07/24 1926     loperamide (Imodium) capsule 2 mg  2 mg 4X/DAY PRN Juvenal Bailon M.D.       Last reviewed on 12/7/2024  3:48 AM by Krys Pena R.N.       Current Outpatient Medications:  Medications Prior to Admission   Medication Sig Dispense Refill Last Dose/Taking    gabapentin (NEURONTIN) 100 MG Cap Take 1 Capsule by mouth 3 times a day. 90 Capsule 3 12/5/2024    glucose blood (ONETOUCH ULTRA) strip Use 1 Strip for Blood glucose checks 100 Strip 0     insulin glargine (LANTUS SOLOSTAR) 100 UNIT/ML Solution Pen-injector injection Inject 20 Units under the skin every evening. 15 mL 3     promethazine (PHENERGAN) 12.5 MG tablet Take 1-2 Tablets by mouth every four hours as needed for Nausea/Vomiting (if no relief from ondansetron or if ondansetron is not ordered). 30 Tablet 0     loperamide (IMODIUM) 2 MG Cap Take 1 Capsule by mouth 4 times a day as needed for Diarrhea. 30 Capsule 0     acetaminophen (TYLENOL) 325 MG Tab Take 2 Tablets by mouth every 6 hours as needed for Mild Pain. 30 Tablet 0     ibuprofen (MOTRIN) 600 MG Tab Take 1 Tablet by mouth every 6 hours as needed for Mild Pain. 30 Tablet 0     omeprazole (PRILOSEC) 20 MG delayed-release capsule Take 1 Capsule by mouth every day. 30 Capsule 0     insulin aspart (NOVOLOG FLEXPEN) 100 UNIT/ML injection PEN Inject 3-14 Units under the skin 3 times a day before meals. For glucose:  70 - 150 mg/dL = 0 Units  151 - 200 mg/dL = 3 Units  201 - 250 mg/dL = 4 Units  251 - 300 mg/dL = 7 Units  301 - 350 mg/dL = 10 Units  351 - 400 mg/dL = 12 Units  Over 400 mg/dL  = 14 Units 12 mL 0     Blood Glucose Monitoring Suppl (BLOOD GLUCOSE MONITOR SYSTEM) w/Device Kit Test blood sugar as recommended by provider. 1 Kit 0     Lancets Use one lancet to test blood sugar three times daily before meals. 100 Each 0     Alcohol Swabs Wipe site with prep pad prior to injection. 100 Each 0     Insulin Pen Needle 32 G x 4 mm Use one pen needle in pen device to inject insulin four times  daily. 100 Each 0     cyclobenzaprine (FLEXERIL) 5 mg tablet Take 1 Tablet by mouth 3 times a day as needed for Moderate Pain. (Patient taking differently: Take 5 mg by mouth 3 times a day as needed for Moderate Pain. Patient still taking, unsure of last dose) 45 Tablet 0     Continuous Glucose Sensor (FREESTYLE MAITE 14 DAY SENSOR) Misc 1 Each every 14 days. 6 Each 3     Continuous Glucose  (FREESTYLE MAITE 14 DAY READER) Device To use with freestyle maite sensor 1 Each 0     aspirin 81 MG EC tablet Take 81 mg by mouth every day.            Medication Allergies:  Allergies   Allergen Reactions    Ketamine Unspecified     aggressive         Family Medical History:  Family History   Problem Relation Age of Onset    Heart Disease Father          Social History:  Social History     Socioeconomic History    Marital status: Single     Spouse name: Not on file    Number of children: Not on file    Years of education: Not on file    Highest education level: Not on file   Occupational History    Not on file   Tobacco Use    Smoking status: Never    Smokeless tobacco: Never   Vaping Use    Vaping status: Not on file   Substance and Sexual Activity    Alcohol use: Not Currently    Drug use: Not Currently    Sexual activity: Not on file   Other Topics Concern    Not on file   Social History Narrative    ** Merged History Encounter **          Social Drivers of Health     Financial Resource Strain: Medium Risk (1/20/2024)    Received from [facility]    Overall Financial Resource Strain (CARDIA)     Difficulty of Paying Living Expenses: Somewhat hard   Food Insecurity: No Food Insecurity (12/6/2024)    Hunger Vital Sign     Worried About Running Out of Food in the Last Year: Never true     Ran Out of Food in the Last Year: Never true   Recent Concern: Food Insecurity - Food Insecurity Present (10/19/2024)    Hunger Vital Sign     Worried About Running Out of Food in the Last Year: Sometimes true  "    Ran Out of Food in the Last Year: Never true   Transportation Needs: Unmet Transportation Needs (12/6/2024)    PRAPARE - Transportation     Lack of Transportation (Medical): Yes     Lack of Transportation (Non-Medical): Yes   Physical Activity: Inactive (1/20/2024)    Received from [facility]    Exercise Vital Sign     Days of Exercise per Week: 0 days     Minutes of Exercise per Session: 0 min   Stress: Stress Concern Present (1/20/2024)    Received from [facility]    [employer] [place] [employer] - Occupational Stress Questionnaire     Feeling of Stress : Rather much   Social Connections: Socially Isolated (1/20/2024)    Received from [facility]    Social Connection and Isolation Panel [NHANES]     Frequency of Communication with Friends and Family: Once a week     Frequency of Social Gatherings with Friends and Family: Never     Attends [employer] Services: Never     Active Member of Clubs or Organizations: No     Attends Club or Organization Meetings: Never     Marital Status:    Intimate Partner Violence: Not At Risk (12/6/2024)    Humiliation, Afraid, Rape, and Kick questionnaire     Fear of Current or Ex-Partner: No     Emotionally Abused: No     Physically Abused: No     Sexually Abused: No   Housing Stability: Low Risk  (12/6/2024)    Housing Stability Vital Sign     Unable to Pay for Housing in the Last Year: No     Number of Times Moved in the Last Year: 0     Homeless in the Last Year: No         Vital signs:  Weight/BMI: Body mass index is 23.2 kg/m².  /81   Pulse 90   Temp 36.5 °C (97.7 °F) (Temporal)   Resp 17   Ht 1.829 m (6' 0.01\")   Wt 77.6 kg (171 lb 1.2 oz)   SpO2 99%   Vitals:    12/07/24 1554 12/07/24 1911 12/08/24 0353 12/08/24 0804   BP: 113/70 126/78 112/64 137/81   Pulse: 94 96  90   Resp: 16 17 18 17   Temp: 36.5 °C (97.7 °F) 36.9 °C (98.5 °F) 36.5 °C (97.7 °F) 36.5 °C (97.7 °F) "   TempSrc: Temporal Temporal Temporal Temporal   SpO2:  97% 99% 99%   Weight:       Height:         Oxygen Therapy:  Pulse Oximetry: 99 %, O2 (LPM): 5, O2 Delivery Device: Silicone Nasal Cannula    Intake/Output Summary (Last 24 hours) at 12/8/2024 1346  Last data filed at 12/8/2024 1052  Gross per 24 hour   Intake 880 ml   Output 1450 ml   Net -570 ml         Physical Exam:  Physical Exam  Constitutional:       General: He is not in acute distress.     Appearance: Normal appearance. He is not ill-appearing, toxic-appearing or diaphoretic.   HENT:      Head: Normocephalic and atraumatic.      Nose: Nose normal.      Mouth/Throat:      Mouth: Mucous membranes are moist.   Eyes:      General: No scleral icterus.  Cardiovascular:      Rate and Rhythm: Regular rhythm.      Heart sounds: Normal heart sounds.   Pulmonary:      Effort: Pulmonary effort is normal.      Breath sounds: Normal breath sounds.   Abdominal:      General: Bowel sounds are normal. There is no distension.      Palpations: Abdomen is soft.      Tenderness: There is no abdominal tenderness.   Musculoskeletal:         General: Normal range of motion.      Cervical back: Neck supple.   Skin:     General: Skin is warm and dry.   Neurological:      Mental Status: He is alert and oriented to person, place, and time.   Psychiatric:         Mood and Affect: Mood normal.         Behavior: Behavior normal.                 Labs:  Recent Labs     12/06/24 1654 12/07/24 0222 12/08/24  0355   SODIUM 132* 134* 136   POTASSIUM 4.6 4.0 3.7   CHLORIDE 92* 96 100   CO2 22 26 29   BUN 31* 25* 21   CREATININE 1.12 0.96 0.84   MAGNESIUM 2.0  --   --    PHOSPHORUS 3.2  --  1.9*   CALCIUM 8.6 8.8 8.0*     Recent Labs     12/06/24 1654 12/07/24 0222 12/08/24  0355   ALTSGPT <5 <5  --    ASTSGOT 11* 11*  --    ALKPHOSPHAT 91 102*  --    TBILIRUBIN 0.4 0.2  --    LIPASE 12  --   --    GLUCOSE 459* 203* 188*     Recent Labs     12/06/24  1654 12/07/24 0222 12/08/24  0355    WBC 14.2* 10.4 6.0   NEUTSPOLYS 87.30* 80.40*  --    LYMPHOCYTES 6.80* 11.40*  --    MONOCYTES 5.10 7.50  --    EOSINOPHILS 0.10 0.20  --    BASOPHILS 0.10 0.10  --    ASTSGOT 11* 11*  --    ALTSGPT <5 <5  --    ALKPHOSPHAT 91 102*  --    TBILIRUBIN 0.4 0.2  --      Recent Labs     12/06/24  1654 12/07/24  0222 12/08/24  0355   RBC 4.18* 4.15* 3.62*   HEMOGLOBIN 11.1* 10.7* 9.4*   HEMATOCRIT 33.9* 33.0* 29.1*   PLATELETCT 273 251 206     Recent Results (from the past 24 hours)   POCT glucose device results    Collection Time: 12/07/24  2:12 PM   Result Value Ref Range    POC Glucose, Blood 139 (H) 65 - 99 mg/dL   POCT glucose device results    Collection Time: 12/07/24  7:25 PM   Result Value Ref Range    POC Glucose, Blood 138 (H) 65 - 99 mg/dL   POCT glucose device results    Collection Time: 12/07/24  9:40 PM   Result Value Ref Range    POC Glucose, Blood 199 (H) 65 - 99 mg/dL   CBC WITHOUT DIFFERENTIAL    Collection Time: 12/08/24  3:55 AM   Result Value Ref Range    WBC 6.0 4.8 - 10.8 K/uL    RBC 3.62 (L) 4.70 - 6.10 M/uL    Hemoglobin 9.4 (L) 14.0 - 18.0 g/dL    Hematocrit 29.1 (L) 42.0 - 52.0 %    MCV 80.4 (L) 81.4 - 97.8 fL    MCH 26.0 (L) 27.0 - 33.0 pg    MCHC 32.3 32.3 - 36.5 g/dL    RDW 39.1 35.9 - 50.0 fL    Platelet Count 206 164 - 446 K/uL    MPV 9.3 9.0 - 12.9 fL   Renal Function Panel    Collection Time: 12/08/24  3:55 AM   Result Value Ref Range    Sodium 136 135 - 145 mmol/L    Potassium 3.7 3.6 - 5.5 mmol/L    Chloride 100 96 - 112 mmol/L    Co2 29 20 - 33 mmol/L    Glucose 188 (H) 65 - 99 mg/dL    Creatinine 0.84 0.50 - 1.40 mg/dL    Bun 21 8 - 22 mg/dL    Calcium 8.0 (L) 8.5 - 10.5 mg/dL    Correct Calcium 8.9 8.5 - 10.5 mg/dL    Phosphorus 1.9 (L) 2.5 - 4.5 mg/dL    Albumin 2.9 (L) 3.2 - 4.9 g/dL   ESTIMATED GFR    Collection Time: 12/08/24  3:55 AM   Result Value Ref Range    GFR (CKD-EPI) 100 >60 mL/min/1.73 m 2   POCT glucose device results    Collection Time: 12/08/24 10:08 AM   Result  Value Ref Range    POC Glucose, Blood 192 (H) 65 - 99 mg/dL         Radiology Review:  CT-CTA CHEST PULMONARY ARTERY W/ RECONS   Final Result      1.  No CT evidence of pulmonary emboli.      2.  Mild wedge type compression fracture of T12 vertebral body of indeterminate age likely chronic.            DX-CHEST-LIMITED (1 VIEW)   Final Result         No acute cardiopulmonary abnormalities are identified.            MDM (Data Review):   -Records reviewed and summarized in current documentation  -I personally reviewed and interpreted the laboratory results  -I personally reviewed the radiology images    Assessment/Recommendations:    Impression:  Known history of severe erosive esophagitis 2023 with finding of esophagitis on imaging this admission.  This may be caused by severe gastroparesis as demonstrated on previous GES  Marked gastroparesis which is exacerbated by hyperglycemia/ poor diabetic control  Abdominal pain  Nausea and vomiting  Microcytic anemia.  ?due to chronic erosive esohagitis, poor duodenal absorption of iron    Recs:   Does not need a H2RA if on high dose PPI but I changed PPI to BID for best 24 hour coverage.   On Carafate suspension.  Will hold until after EGD as medication adheres to mucosal wall making visualization difficult.   Cannot use NSAIDs when treating erosive esophagitis meanwhile patient on ASA and Lovenox.  I have dc'd NSAIDs  I would be very cautious in giving someone with chronic abd pain and severe gastroparesis, narcotics for pain  Diet should be low fat and flow fiber, small frequent meals when treating gastroparesis.    Gastric motility should improve once glucose normalized.  Sometimes need IV erythromycin for inpatients with gastroparesis.  Don't like to use long term due to tachyphylaxis.    I think he would benefit from diabetic educator and nutritionist for DM and gastroparesis  He needs to get re-established with [facility] and they can work more with gastroparesis.   They may want to refer him to tertiary care for consideration of G-POEM  Will schedule for EGD tomorrow am.  To reduce volume of food in stomach, full liq dinner tonight and NPO at [place]          Radha Ivan M.D.          Core Quality Measures   Reviewed items:  Labs, Medications and Radiology reports reviewed

## 2024-12-08 NOTE — PROGRESS NOTES
[facility] Internal Medicine Daily Progress Note    Date of Service  12/8/2024    [employer] Team: R IM Blue Team   Attending: Dashawn Dobson M.d.  Senior Resident: Jarvis Girard  Intern:  Johnna Argueta  Contact Number: [phone]    Chief Complaint  Christoph Taylor is a 59 y.o. male admitted 12/6/2024 with intractable abdominal pain    Hospital Course  Christoph Taylor is a 59 year old male patient with PMHx of T2DM with neuropathy (HbA1C: 13), severe LA class D oesophagitis, CKD, HLD, Chronic lower back and neck pain with motor neuropathy of upper B/L limbs, and previous hospitalization for DKA, recurrent oesophagitis presented to the hospital on 12/6/2024 via [facility] for intractable abdominal pain, hyperglycemia and suicidal ideation. He was recently seen in ED on 12/5 for similar abdominal pain, and sent home when no acute abnormalities were detected.     On this admission his abdominal pain is located to the same spot, left upper quadrant under his ribs. Symptomatologies are in keeping with likely peptic ulcer disease as the pain is trigger by oral intake, and relieved with anti-acids. His last EGD was done in 3/2023 which showed severe oesophagitis.    His poorly controlled diabetes is a result of noncompliance which is due to poor understanding and fear of insulin therapy (which had previously resulted in reported cardiac arrest). Diabetic education has been consulted.    During hospitalization patient expressed suicidal thoughts, in the context of severe depression and hopelessness given all his medical conditions he is trying to juggle. Legal hold was placed and recommendation by psychiatry to start Effexor for mood and transfer to inpatient psychiatric hospital when medically cleared.    Interval Problem Update  Overnight events:  Patient had no significant overnight events.    12/8:  This morning, He reports feeling better but continues to have postprandial left upper quadrant pain and rates it 8/10.  Endorses minimal relief with GI cocktail. Last bowel movement was 4 days back.       I have discussed this patient's plan of care and discharge plan at IDT rounds today with Case Management, Nursing, Nursing leadership, and other members of the IDT team.    Consultants/Specialty  psychiatry    Code Status  Full Code    Disposition  The patient is not medically cleared for discharge to home or a post-acute facility.      I have placed the appropriate orders for post-discharge needs.    Review of Systems  Review of Systems   Constitutional:  Positive for malaise/fatigue and weight loss. Negative for chills and fever.   HENT:  Negative for hearing loss.    Eyes:  Negative for blurred vision.   Respiratory:  Negative for cough, hemoptysis and shortness of breath.    Cardiovascular:  Negative for chest pain and palpitations.   Gastrointestinal:  Positive for abdominal pain, heartburn and nausea. Negative for constipation, diarrhea and vomiting.   Genitourinary:  Negative for dysuria, hematuria and urgency.   Musculoskeletal:  Negative for myalgias.   Neurological:  Negative for dizziness and headaches.   Psychiatric/Behavioral:  Negative for depression.         Physical Exam  Temp:  [36.5 °C (97.7 °F)-36.9 °C (98.5 °F)] 36.5 °C (97.7 °F)  Pulse:  [90-96] 90  Resp:  [16-18] 17  BP: (112-137)/(64-81) 137/81  SpO2:  [97 %-99 %] 99 %    Physical Exam  Constitutional:       General: He is not in acute distress.  HENT:      Mouth/Throat:      Mouth: Mucous membranes are moist.   Eyes:      Pupils: Pupils are equal, round, and reactive to light.   Cardiovascular:      Rate and Rhythm: Normal rate and regular rhythm.      Pulses: Normal pulses.      Heart sounds: No murmur heard.  Pulmonary:      Effort: Pulmonary effort is normal. No respiratory distress.      Breath sounds: No wheezing.   Abdominal:      General: Abdomen is flat.      Palpations: Abdomen is soft.      Tenderness: There is abdominal tenderness (Left upper  quadrant).   Musculoskeletal:         General: Deformity (Left foot) present. Normal range of motion.   Skin:     Capillary Refill: Capillary refill takes less than 2 seconds.   Neurological:      General: No focal deficit present.      Mental Status: He is alert and oriented to person, place, and time.      Motor: No weakness.   Psychiatric:      Comments: depressed         Fluids    Intake/Output Summary (Last 24 hours) at 12/8/2024 1459  Last data filed at 12/8/2024 1052  Gross per 24 hour   Intake 760 ml   Output 1450 ml   Net -690 ml       Laboratory  Recent Labs     12/06/24  1654 12/07/24 0222 12/08/24  0355   WBC 14.2* 10.4 6.0   RBC 4.18* 4.15* 3.62*   HEMOGLOBIN 11.1* 10.7* 9.4*   HEMATOCRIT 33.9* 33.0* 29.1*   MCV 81.1* 79.5* 80.4*   MCH 26.6* 25.8* 26.0*   MCHC 32.7 32.4 32.3   RDW 40.0 37.9 39.1   PLATELETCT 273 251 206   MPV 9.4 9.2 9.3     Recent Labs     12/06/24  1654 12/07/24 0222 12/08/24  0355   SODIUM 132* 134* 136   POTASSIUM 4.6 4.0 3.7   CHLORIDE 92* 96 100   CO2 22 26 29   GLUCOSE 459* 203* 188*   BUN 31* 25* 21   CREATININE 1.12 0.96 0.84   CALCIUM 8.6 8.8 8.0*                   Imaging  CT-CTA CHEST PULMONARY ARTERY W/ RECONS   Final Result      1.  No CT evidence of pulmonary emboli.      2.  Mild wedge type compression fracture of T12 vertebral body of indeterminate age likely chronic.            DX-CHEST-LIMITED (1 VIEW)   Final Result         No acute cardiopulmonary abnormalities are identified.           Assessment/Plan  Problem Representation:    * Acute respiratory failure (HCC)- (present on admission)  Assessment & Plan  Patient presented with acute hypoxic respiratory failure of unknown etiology which is currently resolving.   Chest x-ray done on admission was  negative for pneumonia,CTA of pulmonary artery negative for PE.  Consulting clinic on 7/2024 was normal.  Stress test in October 2024 was normal. He was given ceftriaxone empirically in the ER for pneumonia, however he  his procalcitonin was negative. He's currently needing 5L oxygen when sleeping and  3 L when awake.  - possibly due to undiagnosed sleep apnea, okay for oxygen while sleeping.  - wean off oxygen while awake  - Outpatient sleep study    Intractable left upper quadrant abdominal pain- (present on admission)  Assessment & Plan  Patient complains of intractable left upper quadrant abdominal pain which usually occurs with oral intake. relieved with GI cocktail. He has a hx of severe LA class D esophagitis. Last EGD was done on 3/14/2024. He reports significant reduction in his appetite and weight as well.   - GI consult for an EGD (screening for any malignancy)  - Continue GI cocktail PRN  - Continue Omeprazole 40mg daily  - Continue famotidine 20mg BID  - Continue scheduled sucralfate [id number]  (Would recommend above management for at least 6 weeks).      Sleep apnea  Assessment & Plan  Likely, due to desaturations while sleeping  - will need sleep consultation outpatient.   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    Major depressive disorder  Assessment & Plan  Depressive mood In context of several medical conditions and disabilities impacting quality of life  - psych consulted  - Continue legal hold  per psych  - Start Effexor (venlafaxine) per psych  - Inpatient psych transfer once medically cleared    Suicidal ideations  Assessment & Plan  Patient was placed on legal hold with sitter  - psych consult, appreciate recs  Passive thoughts, No plan  - see major depressive disorder plan    Chronic midline low back pain  Assessment & Plan  CTA chest revealed T12 fracture - likely chronic in nature  - Continue pain management  - Continue lidocaine  - avoid dilaudid  -  oxycodone for breakthrough, however will likely discontinue once for sure etiology is gastric    LLQ abdominal pain  Assessment & Plan  It is chronic.  CT of the abdomen from 12/5 did not reveal    Uncontrolled type 2 diabetes mellitus with hyperglycemia (HCC)- (present on admission)  Assessment & Plan  He presented with blood sugar in the 400s due to noncompliance with long-acting insulin.  -Restart Lantus 20 units every evening  -Continue Insulin sliding scale  -Insulin Compliance counseling  -Diabetic educator    CKD (chronic kidney disease)- (present on admission)  Assessment & Plan  Patient had a creatinine 1.12, BUN 31 on presentation and was somewhat depleted intravascularly. He received 1L of IV fluids in the ED. RFTs on repeat shows improvement with Cr: 0.84 and BUN: 21.    -Avoid nephrotoxins  -Continue to monitor    Leukocytosis- (present on admission)  Assessment & Plan  Patient had a WBC 14.2 on admission, likely reactive, 2/2 hyperglycemia or esophagitis as CT-Scan of the abdomen from yesterday showed possible esophagitis.  empirically received ceftriaxone in ER. Chest x-ray negative for pneumonia, CTPE of the chest was negative. UA negative for LE, nitrites, WBC and bacteria. WBC as of 12/8 is at 6.0.  -No antibiotics for now         VTE prophylaxis: enoxaparin ppx    I have performed a physical exam and reviewed and updated ROS and Plan today (12/8/2024). In review of yesterday's note (12/7/2024), there are no changes except as documented above.

## 2024-12-08 NOTE — PROGRESS NOTES
Message sent to Johnna Valenzuela: Patient has a jiménez in for retention. It took 5 attempts to place so it has not been removed yet. Do you want it removed today?     Response from Johnna Valenzuela: Let's keep it for some time

## 2024-12-08 NOTE — PROGRESS NOTES
Called and updated patient's son,Nickolas, that his dad is in the hospital for esophagitis per patient request.

## 2024-12-08 NOTE — H&P
Hospital Medicine History & Physical Note    Date of Service  12/6/2024    Primary Care Physician  YESY Tariq.        Code Status  Full Code    Chief Complaint  Chief Complaint   Patient presents with    Abdominal Pain     X months.  Pt seen here yesterday for similar complaint.      Elevated Glucose     Pt arrives with a FSBG=  429.  He reports he has not taken his lantus x 4 days.  He reports he has not eaten much over last four days.      Suicidal Ideation     Pt reports feeling suicidal last night after being discharged home       History of Presenting Illness  Christoph Taylor is a 59 y.o. male with history of chronic upper abdominal pain uncontrolled diabetes with DKA's, CKD 3, kidney stones, horseshoe kidney, esophagitis, who presented 12/6/2024 with ongoing upper abdominal pain on and off associated with nausea and occasional vomiting.  He presented with the same complaints yesterday and was evaluated to be CT of the abdomen with contrast that showed possible esophagitis, distended urinary bladder and 5 mm nonobstructive kidney stone.  He reported suicidal ideations yesterday, but denies any ideations or plans today  During observation in ER he transiently desaturated to 83% and was placed on 2 L nasal cannula.  Blood work showed WBC 14.2.  COVID-19/influenza negative.  Chest x-ray: Negative.  CT of pulmonary artery was ordered  Blood sugar is in the 400s.  Patient stated he skipped 3 days of Lantus.  Bladder scan revealed 1 L of urine and I ordered Crowley catheter  Treatment in the ER included ceftriaxone and azithromycin for possible early pneumonia, 1 L of Ringer lactate and Tylenol 1 g.  Of note, patient denies cough or shortness of breath.    I discussed the plan of care with patient, bedside RN, and ERP .    Review of Systems  Review of Systems   Constitutional:  Negative for chills, fever and weight loss.   HENT:  Negative for ear pain, hearing loss and tinnitus.    Eyes:  Negative for  blurred vision, double vision and photophobia.   Respiratory:  Negative for cough, hemoptysis and sputum production.    Cardiovascular:  Negative for chest pain, palpitations and orthopnea.   Gastrointestinal:  Positive for abdominal pain and nausea. Negative for heartburn and vomiting.   Genitourinary:  Negative for dysuria, flank pain, frequency and hematuria.   Musculoskeletal:  Negative for back pain, joint pain and neck pain.   Skin:  Negative for itching and rash.   Neurological:  Negative for tremors, speech change, focal weakness and headaches.   Endo/Heme/Allergies:  Negative for environmental allergies and polydipsia. Does not bruise/bleed easily.   Psychiatric/Behavioral:  Negative for hallucinations and substance abuse. The patient is not nervous/anxious.        Past Medical History   has a past medical history of Abnormal electrocardiogram (ECG) (EKG) (11/08/2021), Acute cystitis without hematuria (01/26/2024), Acute esophagitis (07/28/2022), Acute hypoxic respiratory failure (HCC) (07/22/2024), Acute metabolic encephalopathy (06/09/2023), Acute on chronic anemia (05/06/2024), Acute on chronic respiratory failure (HCC) (05/20/2024), Acute respiratory failure with hypoxia ([facility]) (07/25/2024), Acute superficial gastritis without hemorrhage (05/21/2024), Agitation (06/09/2024), KRISTAL (acute kidney injury) ([facility]) (11/08/2021), AP (abdominal pain) (06/08/2023), Chest pain in adult (11/08/2021), Chronic renal insufficiency (06/04/2024), CKD (chronic kidney disease) stage 3, GFR 30-59 ml/min (11/07/2021), Diabetes ([facility]), Diabetic ketoacidosis without coma ([facility]) (11/07/2021), Diabetic ketoacidosis without coma associated with type 2 diabetes mellitus ([facility]) (11/07/2021), Diarrhea (03/18/2022), DKA, type 1, not at goal ([facility]) (07/28/2022), Elevated troponin (06/10/2023), Esophagitis (07/28/2022), [place] (hard of hearing), Hypercholesteremia, Impaction of intestine ([facility]) (05/21/2024), Intractable left upper quadrant  abdominal pain (2024), Leukocytosis (2024), Metabolic acidosis, increased anion gap (2021), Syncope (2023), and Transaminitis (2024).    Surgical History   has a past surgical history that includes other; pr upper gi endoscopy,diagnosis (N/A, 3/14/2023); finger or hand incision and drainage (Right, 2023); cervical disk and fusion anterior (N/A, 2024); fusion, spine, lumbar, plif (N/A, 2024); and lumbar laminectomy diskectomy (N/A, 2024).     Family History  family history includes Heart Disease in his father.   Family history reviewed with patient. There is no family history that is pertinent to the chief complaint.     Social History   reports that he has never smoked. He has never used smokeless tobacco. He reports that he does not currently use alcohol. He reports that he does not currently use drugs.    Allergies  Allergies   Allergen Reactions    Ketamine Unspecified     aggressive       Medications  Prior to Admission Medications   Prescriptions Last Dose Informant Patient Reported? Taking?   Alcohol Swabs  Patient No No   Sig: Wipe site with prep pad prior to injection.   Blood Glucose Monitoring Suppl (BLOOD GLUCOSE MONITOR SYSTEM) w/Device Kit  Patient No No   Sig: Test blood sugar as recommended by provider.   Continuous Glucose  (FREESTYLE MAITE 14 DAY READER) Device  Patient No No   Sig: To use with freestyle maite sensor   Continuous Glucose Sensor (FREESTYLE MAITE 14 DAY SENSOR) [facility]  Patient No No   Si Each every 14 days.   Insulin Pen Needle 32 G x 4 mm  Patient No No   Sig: Use one pen needle in pen device to inject insulin four times daily.   Lancets  Patient No No   Sig: Use one lancet to test blood sugar three times daily before meals.   acetaminophen (TYLENOL) 325 MG Tab   No No   Sig: Take 2 Tablets by mouth every 6 hours as needed for Mild Pain.   aspirin 81 MG EC tablet  Patient Yes No   Sig: Take 81 mg by mouth every day.    cyclobenzaprine (FLEXERIL) 5 mg tablet  Patient No No   Sig: Take 1 Tablet by mouth 3 times a day as needed for Moderate Pain.   gabapentin (NEURONTIN) 100 MG Cap  Patient No No   Sig: Take 1 Capsule by mouth 3 times a day.   glucose blood (ONETOUCH ULTRA) strip   No No   Sig: Use 1 Strip for Blood glucose checks   ibuprofen (MOTRIN) 600 MG Tab   No No   Sig: Take 1 Tablet by mouth every 6 hours as needed for Mild Pain.   insulin aspart (NOVOLOG FLEXPEN) 100 UNIT/ML injection PEN  Patient No No   Sig: Inject 3-14 Units under the skin 3 times a day before meals. For glucose:  70 - 150 mg/dL = 0 Units  151 - 200 mg/dL = 3 Units  201 - 250 mg/dL = 4 Units  251 - 300 mg/dL = 7 Units  301 - 350 mg/dL = 10 Units  351 - 400 mg/dL = 12 Units  Over 400 mg/dL  = 14 Units   insulin glargine (LANTUS SOLOSTAR) 100 UNIT/ML Solution Pen-injector injection   No No   Sig: Inject 20 Units under the skin every evening.   loperamide (IMODIUM) 2 MG Cap   No No   Sig: Take 1 Capsule by mouth 4 times a day as needed for Diarrhea.   omeprazole (PRILOSEC) 20 MG delayed-release capsule   No No   Sig: Take 1 Capsule by mouth every day.   promethazine (PHENERGAN) 12.5 MG tablet   No No   Sig: Take 1-2 Tablets by mouth every four hours as needed for Nausea/Vomiting (if no relief from ondansetron or if ondansetron is not ordered).      Facility-Administered Medications: None       Physical Exam  Temp:  [36.4 °C (97.5 °F)-36.9 °C (98.4 °F)] 36.5 °C (97.7 °F)  Pulse:  [] 94  Resp:  [12-22] 16  BP: (112-182)/() 113/70  SpO2:  [83 %-99 %] 99 %  Blood Pressure: (!) 145/84   Temperature: 36.9 °C (98.4 °F)   Pulse: 100   Respiration: 13   Pulse Oximetry: 90 %       Physical Exam  Vitals and nursing note reviewed.   Constitutional:       General: He is not in acute distress.     Appearance: Normal appearance. He is ill-appearing.   HENT:      Head: Normocephalic and atraumatic.      Nose: Nose normal.      Mouth/Throat:      Mouth: Mucous  "membranes are moist.   Eyes:      Extraocular Movements: Extraocular movements intact.      Pupils: Pupils are equal, round, and reactive to light.   Cardiovascular:      Rate and Rhythm: Normal rate and regular rhythm.   Pulmonary:      Effort: Pulmonary effort is normal.      Breath sounds: Normal breath sounds.   Abdominal:      General: Abdomen is flat. There is no distension.      Tenderness: There is abdominal tenderness in the suprapubic area and left upper quadrant. There is no guarding or rebound.   Musculoskeletal:         General: No swelling or deformity. Normal range of motion.      Cervical back: Normal range of motion and neck supple.   Skin:     General: Skin is warm and dry.   Neurological:      General: No focal deficit present.      Mental Status: He is alert and oriented to person, place, and time.   Psychiatric:         Mood and Affect: Mood normal.         Behavior: Behavior normal.         Laboratory:  Recent Labs     12/05/24  0700 12/06/24  1654 12/07/24 0222   WBC 11.2* 14.2* 10.4   RBC 4.64* 4.18* 4.15*   HEMOGLOBIN 12.2* 11.1* 10.7*   HEMATOCRIT 37.0* 33.9* 33.0*   MCV 79.7* 81.1* 79.5*   MCH 26.3* 26.6* 25.8*   MCHC 33.0 32.7 32.4   RDW 38.3 40.0 37.9   PLATELETCT 252 273 251   MPV 9.2 9.4 9.2     Recent Labs     12/05/24  0700 12/06/24  1654 12/07/24  0222   SODIUM 132* 132* 134*   POTASSIUM 4.2 4.6 4.0   CHLORIDE 91* 92* 96   CO2 23 22 26   GLUCOSE 450* 459* 203*   BUN 21 31* 25*   CREATININE 0.83 1.12 0.96   CALCIUM 8.9 8.6 8.8     Recent Labs     12/05/24  0700 12/06/24  1654 12/07/24  0222   ALTSGPT 6 <5 <5   ASTSGOT 11* 11* 11*   ALKPHOSPHAT 95 91 102*   TBILIRUBIN 0.5 0.4 0.2   LIPASE 11 12  --    GLUCOSE 450* 459* 203*         Recent Labs     12/06/24 1654   NTPROBNP 322*         No results for input(s): \"TROPONINT\" in the last 72 hours.    Imaging:  CT-CTA CHEST PULMONARY ARTERY W/ RECONS   Final Result      1.  No CT evidence of pulmonary emboli.      2.  Mild wedge type " compression fracture of T12 vertebral body of indeterminate age likely chronic.            DX-CHEST-LIMITED (1 VIEW)   Final Result         No acute cardiopulmonary abnormalities are identified.          X-Ray:  I have personally reviewed the images and compared with prior images.    Assessment/Plan:  Justification for Admission Status  I anticipate this patient will require at least two midnights for appropriate medical management, necessitating inpatient admission because respiratory failure with hypoxia    Patient will need a Telemetry bed on MEDICAL service .  The need is secondary to respiratory failure with hypoxia.    * Acute respiratory failure (HCC)- (present on admission)  Assessment & Plan  Currently Resolving. Presented with hypoxia of unclear etiology.    Chest x-ray is negative for pneumonia,CTA of pulmonary artery negative for PE.  Echo on 7/2024 was normal.  Stress test in October 2024 was normal   he was given ceftriaxone empirically in the ER, however he has low procalcitonin    - possibly due to undiagnosed sleep apnea, okay for oxygen while sleeping.  - wean off oxygen while awake        Major depressive disorder  Assessment & Plan  Depressive mood In context of several medical conditions and disabilities impacting quality of life  Consider psych eval    LLQ abdominal pain  Assessment & Plan      Intractable left upper quadrant abdominal pain- (present on admission)  Assessment & Plan  It is chronic.  CT of the abdomen from 12/5 did not reveal acute process  Occurs with oral intake. Relieved with GI cocktail  - GI cocktail PRN    Uncontrolled type 2 diabetes mellitus with hyperglycemia (HCC)- (present on admission)  Assessment & Plan  Presented with blood sugar in the 400s due to noncompliance with long-acting insulin  Plan: Restart Lantus 20 units every evening  Insulin sliding scale  Compliance counseling  -Diabetic educator    CKD (chronic kidney disease)- (present on admission)  Assessment &  Plan  Creatinine 1.12, BUN 31.  The patient may be somewhat intravascular depleted.    - s/p 1.0L IVF in ED  Avoid nephrotoxins    Leukocytosis- (present on admission)  Assessment & Plan  Perhaps it is reactive. WBC 14.2.  The patient empirically received ceftriaxone in ER  Possibly reactive secondary to hyperglycemia or esophagitis.  CT of the abdomen from yesterday showed possible esophagitis.  Chest x-ray negative for pneumonia, CT of the chest pending  UA pending  Procalcitonin is not elevated.  We will defer antibiotics for now.        VTE prophylaxis: enoxaparin ppx

## 2024-12-09 ENCOUNTER — TELEPHONE (OUTPATIENT)
Dept: MEDICAL GROUP | Facility: LAB | Age: 59
End: 2024-12-09
Payer: COMMERCIAL

## 2024-12-09 ENCOUNTER — ANESTHESIA (OUTPATIENT)
Dept: SURGERY | Facility: MEDICAL CENTER | Age: 59
DRG: 380 | End: 2024-12-09
Payer: COMMERCIAL

## 2024-12-09 LAB
C PEPTIDE SERPL-MCNC: 0.4 NG/ML (ref 0.5–3.3)
ERYTHROCYTE [DISTWIDTH] IN BLOOD BY AUTOMATED COUNT: 39.2 FL (ref 35.9–50)
GLUCOSE BLD STRIP.AUTO-MCNC: 114 MG/DL (ref 65–99)
GLUCOSE BLD STRIP.AUTO-MCNC: 193 MG/DL (ref 65–99)
GLUCOSE BLD STRIP.AUTO-MCNC: 204 MG/DL (ref 65–99)
GLUCOSE BLD STRIP.AUTO-MCNC: 222 MG/DL (ref 65–99)
GLUCOSE BLD STRIP.AUTO-MCNC: 67 MG/DL (ref 65–99)
GLUCOSE BLD STRIP.AUTO-MCNC: 79 MG/DL (ref 65–99)
HCT VFR BLD AUTO: 30.6 % (ref 42–52)
HGB BLD-MCNC: 9.8 G/DL (ref 14–18)
MCH RBC QN AUTO: 25.9 PG (ref 27–33)
MCHC RBC AUTO-ENTMCNC: 32 G/DL (ref 32.3–36.5)
MCV RBC AUTO: 81 FL (ref 81.4–97.8)
PATHOLOGY CONSULT NOTE: NORMAL
PLATELET # BLD AUTO: 182 K/UL (ref 164–446)
PMV BLD AUTO: 9.4 FL (ref 9–12.9)
RBC # BLD AUTO: 3.78 M/UL (ref 4.7–6.1)
WBC # BLD AUTO: 4.6 K/UL (ref 4.8–10.8)

## 2024-12-09 PROCEDURE — 700102 HCHG RX REV CODE 250 W/ 637 OVERRIDE(OP): Performed by: INTERNAL MEDICINE

## 2024-12-09 PROCEDURE — 700101 HCHG RX REV CODE 250: Performed by: ANESTHESIOLOGY

## 2024-12-09 PROCEDURE — A9270 NON-COVERED ITEM OR SERVICE: HCPCS | Performed by: INTERNAL MEDICINE

## 2024-12-09 PROCEDURE — 700102 HCHG RX REV CODE 250 W/ 637 OVERRIDE(OP)

## 2024-12-09 PROCEDURE — 160002 HCHG RECOVERY MINUTES (STAT): Performed by: INTERNAL MEDICINE

## 2024-12-09 PROCEDURE — A9270 NON-COVERED ITEM OR SERVICE: HCPCS

## 2024-12-09 PROCEDURE — 770001 HCHG ROOM/CARE - MED/SURG/GYN PRIV*

## 2024-12-09 PROCEDURE — 700111 HCHG RX REV CODE 636 W/ 250 OVERRIDE (IP): Performed by: INTERNAL MEDICINE

## 2024-12-09 PROCEDURE — 43239 EGD BIOPSY SINGLE/MULTIPLE: CPT | Performed by: INTERNAL MEDICINE

## 2024-12-09 PROCEDURE — 88312 SPECIAL STAINS GROUP 1: CPT

## 2024-12-09 PROCEDURE — 160048 HCHG OR STATISTICAL LEVEL 1-5: Performed by: INTERNAL MEDICINE

## 2024-12-09 PROCEDURE — 82962 GLUCOSE BLOOD TEST: CPT

## 2024-12-09 PROCEDURE — 700101 HCHG RX REV CODE 250

## 2024-12-09 PROCEDURE — 160202 HCHG ENDO MINUTES - 1ST 30 MINS LEVEL 3: Performed by: INTERNAL MEDICINE

## 2024-12-09 PROCEDURE — 99232 SBSQ HOSP IP/OBS MODERATE 35: CPT | Mod: GC | Performed by: INTERNAL MEDICINE

## 2024-12-09 PROCEDURE — 85027 COMPLETE CBC AUTOMATED: CPT

## 2024-12-09 PROCEDURE — 160009 HCHG ANES TIME/MIN: Performed by: INTERNAL MEDICINE

## 2024-12-09 PROCEDURE — 51798 US URINE CAPACITY MEASURE: CPT

## 2024-12-09 PROCEDURE — 700105 HCHG RX REV CODE 258

## 2024-12-09 PROCEDURE — 160207 HCHG ENDO MINUTES - EA ADDL 1 MIN LEVEL 3: Performed by: INTERNAL MEDICINE

## 2024-12-09 PROCEDURE — 36415 COLL VENOUS BLD VENIPUNCTURE: CPT

## 2024-12-09 PROCEDURE — 160035 HCHG PACU - 1ST 60 MINS PHASE I: Performed by: INTERNAL MEDICINE

## 2024-12-09 PROCEDURE — 88305 TISSUE EXAM BY PATHOLOGIST: CPT

## 2024-12-09 PROCEDURE — 0DB98ZX EXCISION OF DUODENUM, VIA NATURAL OR ARTIFICIAL OPENING ENDOSCOPIC, DIAGNOSTIC: ICD-10-PCS | Performed by: INTERNAL MEDICINE

## 2024-12-09 PROCEDURE — 0DB58ZX EXCISION OF ESOPHAGUS, VIA NATURAL OR ARTIFICIAL OPENING ENDOSCOPIC, DIAGNOSTIC: ICD-10-PCS | Performed by: INTERNAL MEDICINE

## 2024-12-09 PROCEDURE — 700111 HCHG RX REV CODE 636 W/ 250 OVERRIDE (IP): Performed by: ANESTHESIOLOGY

## 2024-12-09 RX ORDER — LIDOCAINE HYDROCHLORIDE 20 MG/ML
INJECTION, SOLUTION EPIDURAL; INFILTRATION; INTRACAUDAL; PERINEURAL PRN
Status: DISCONTINUED | OUTPATIENT
Start: 2024-12-09 | End: 2024-12-09 | Stop reason: SURG

## 2024-12-09 RX ORDER — METOCLOPRAMIDE 10 MG/1
5 TABLET ORAL
Status: DISCONTINUED | OUTPATIENT
Start: 2024-12-09 | End: 2024-12-13 | Stop reason: HOSPADM

## 2024-12-09 RX ORDER — ALBUTEROL SULFATE 5 MG/ML
2.5 SOLUTION RESPIRATORY (INHALATION)
Status: DISCONTINUED | OUTPATIENT
Start: 2024-12-09 | End: 2024-12-09 | Stop reason: HOSPADM

## 2024-12-09 RX ORDER — SODIUM CHLORIDE, SODIUM LACTATE, POTASSIUM CHLORIDE, CALCIUM CHLORIDE 600; 310; 30; 20 MG/100ML; MG/100ML; MG/100ML; MG/100ML
INJECTION, SOLUTION INTRAVENOUS CONTINUOUS
Status: DISCONTINUED | OUTPATIENT
Start: 2024-12-09 | End: 2024-12-09 | Stop reason: HOSPADM

## 2024-12-09 RX ORDER — VENLAFAXINE 75 MG/1
37.5 TABLET ORAL DAILY
Status: DISCONTINUED | OUTPATIENT
Start: 2024-12-09 | End: 2024-12-13 | Stop reason: HOSPADM

## 2024-12-09 RX ORDER — EPHEDRINE SULFATE 50 MG/ML
5 INJECTION, SOLUTION INTRAVENOUS
Status: DISCONTINUED | OUTPATIENT
Start: 2024-12-09 | End: 2024-12-09 | Stop reason: HOSPADM

## 2024-12-09 RX ORDER — LABETALOL HYDROCHLORIDE 5 MG/ML
5 INJECTION, SOLUTION INTRAVENOUS
Status: DISCONTINUED | OUTPATIENT
Start: 2024-12-09 | End: 2024-12-09 | Stop reason: HOSPADM

## 2024-12-09 RX ORDER — HYDRALAZINE HYDROCHLORIDE 20 MG/ML
5 INJECTION INTRAMUSCULAR; INTRAVENOUS
Status: DISCONTINUED | OUTPATIENT
Start: 2024-12-09 | End: 2024-12-09 | Stop reason: HOSPADM

## 2024-12-09 RX ORDER — ONDANSETRON 2 MG/ML
INJECTION INTRAMUSCULAR; INTRAVENOUS PRN
Status: DISCONTINUED | OUTPATIENT
Start: 2024-12-09 | End: 2024-12-09 | Stop reason: SURG

## 2024-12-09 RX ORDER — SUCCINYLCHOLINE CHLORIDE 20 MG/ML
INJECTION INTRAMUSCULAR; INTRAVENOUS PRN
Status: DISCONTINUED | OUTPATIENT
Start: 2024-12-09 | End: 2024-12-09 | Stop reason: SURG

## 2024-12-09 RX ORDER — DIPHENHYDRAMINE HYDROCHLORIDE 50 MG/ML
12.5 INJECTION INTRAMUSCULAR; INTRAVENOUS
Status: DISCONTINUED | OUTPATIENT
Start: 2024-12-09 | End: 2024-12-09 | Stop reason: HOSPADM

## 2024-12-09 RX ORDER — HALOPERIDOL 5 MG/ML
1 INJECTION INTRAMUSCULAR
Status: DISCONTINUED | OUTPATIENT
Start: 2024-12-09 | End: 2024-12-09 | Stop reason: HOSPADM

## 2024-12-09 RX ORDER — HYDROXYZINE HYDROCHLORIDE 25 MG/1
25 TABLET, FILM COATED ORAL 3 TIMES DAILY PRN
Status: DISCONTINUED | OUTPATIENT
Start: 2024-12-09 | End: 2024-12-09

## 2024-12-09 RX ORDER — PHENYLEPHRINE HCL IN 0.9% NACL 1 MG/10 ML
SYRINGE (ML) INTRAVENOUS PRN
Status: DISCONTINUED | OUTPATIENT
Start: 2024-12-09 | End: 2024-12-09 | Stop reason: SURG

## 2024-12-09 RX ORDER — ONDANSETRON 2 MG/ML
4 INJECTION INTRAMUSCULAR; INTRAVENOUS
Status: DISCONTINUED | OUTPATIENT
Start: 2024-12-09 | End: 2024-12-09 | Stop reason: HOSPADM

## 2024-12-09 RX ADMIN — FENTANYL CITRATE 25 MCG: 50 INJECTION, SOLUTION INTRAMUSCULAR; INTRAVENOUS at 07:59

## 2024-12-09 RX ADMIN — FENTANYL CITRATE 25 MCG: 50 INJECTION, SOLUTION INTRAMUSCULAR; INTRAVENOUS at 08:05

## 2024-12-09 RX ADMIN — ONDANSETRON 4 MG: 2 INJECTION INTRAMUSCULAR; INTRAVENOUS at 07:59

## 2024-12-09 RX ADMIN — FENTANYL CITRATE 50 MCG: 50 INJECTION, SOLUTION INTRAMUSCULAR; INTRAVENOUS at 07:49

## 2024-12-09 RX ADMIN — CYCLOBENZAPRINE 5 MG: 10 TABLET, FILM COATED ORAL at 12:55

## 2024-12-09 RX ADMIN — PROPOFOL 160 MG: 10 INJECTION, EMULSION INTRAVENOUS at 07:50

## 2024-12-09 RX ADMIN — SODIUM CHLORIDE: 9 INJECTION, SOLUTION INTRAVENOUS at 05:52

## 2024-12-09 RX ADMIN — VENLAFAXINE HYDROCHLORIDE 37.5 MG: 75 TABLET ORAL at 17:44

## 2024-12-09 RX ADMIN — LIDOCAINE 1 PATCH: 560 PATCH PERCUTANEOUS; TOPICAL; TRANSDERMAL at 13:02

## 2024-12-09 RX ADMIN — SUCRALFATE ORAL 1 G: 1 SUSPENSION ORAL at 17:44

## 2024-12-09 RX ADMIN — GABAPENTIN 100 MG: 100 CAPSULE ORAL at 04:31

## 2024-12-09 RX ADMIN — CYCLOBENZAPRINE 5 MG: 10 TABLET, FILM COATED ORAL at 19:59

## 2024-12-09 RX ADMIN — ASPIRIN 81 MG: 81 TABLET, COATED ORAL at 04:31

## 2024-12-09 RX ADMIN — INSULIN LISPRO 4 UNITS: 100 INJECTION, SOLUTION INTRAVENOUS; SUBCUTANEOUS at 17:35

## 2024-12-09 RX ADMIN — Medication 200 MCG: at 07:55

## 2024-12-09 RX ADMIN — OMEPRAZOLE 40 MG: 20 CAPSULE, DELAYED RELEASE ORAL at 04:31

## 2024-12-09 RX ADMIN — METOCLOPRAMIDE 5 MG: 10 TABLET ORAL at 17:43

## 2024-12-09 RX ADMIN — INSULIN LISPRO 4 UNITS: 100 INJECTION, SOLUTION INTRAVENOUS; SUBCUTANEOUS at 12:46

## 2024-12-09 RX ADMIN — LIDOCAINE HYDROCHLORIDE 30 ML: 20 SOLUTION OROPHARYNGEAL at 23:19

## 2024-12-09 RX ADMIN — ONDANSETRON 4 MG: 4 TABLET, ORALLY DISINTEGRATING ORAL at 19:58

## 2024-12-09 RX ADMIN — SUCCINYLCHOLINE CHLORIDE 90 MG: 20 INJECTION, SOLUTION INTRAMUSCULAR; INTRAVENOUS at 07:50

## 2024-12-09 RX ADMIN — INSULIN GLARGINE-YFGN 20 UNITS: 100 INJECTION, SOLUTION SUBCUTANEOUS at 17:36

## 2024-12-09 RX ADMIN — SENNOSIDES AND DOCUSATE SODIUM 2 TABLET: 50; 8.6 TABLET ORAL at 17:43

## 2024-12-09 RX ADMIN — GABAPENTIN 100 MG: 100 CAPSULE ORAL at 12:46

## 2024-12-09 RX ADMIN — OMEPRAZOLE 40 MG: 20 CAPSULE, DELAYED RELEASE ORAL at 17:44

## 2024-12-09 RX ADMIN — LIDOCAINE HYDROCHLORIDE 100 MG: 20 INJECTION, SOLUTION EPIDURAL; INFILTRATION; INTRACAUDAL; PERINEURAL at 07:50

## 2024-12-09 RX ADMIN — METOCLOPRAMIDE 5 MG: 10 TABLET ORAL at 12:46

## 2024-12-09 RX ADMIN — SUCRALFATE ORAL 1 G: 1 SUSPENSION ORAL at 20:55

## 2024-12-09 RX ADMIN — ACETAMINOPHEN 650 MG: 325 TABLET, FILM COATED ORAL at 00:47

## 2024-12-09 RX ADMIN — GABAPENTIN 100 MG: 100 CAPSULE ORAL at 19:59

## 2024-12-09 RX ADMIN — ENOXAPARIN SODIUM 40 MG: 100 INJECTION SUBCUTANEOUS at 17:45

## 2024-12-09 RX ADMIN — INSULIN LISPRO 3 UNITS: 100 INJECTION, SOLUTION INTRAVENOUS; SUBCUTANEOUS at 21:06

## 2024-12-09 ASSESSMENT — ENCOUNTER SYMPTOMS
SHORTNESS OF BREATH: 0
DIARRHEA: 0
WEIGHT LOSS: 1
CHILLS: 0
HEMOPTYSIS: 0
NAUSEA: 1
CONSTIPATION: 0
DEPRESSION: 1
VOMITING: 0
ABDOMINAL PAIN: 1
HALLUCINATIONS: 0
HEADACHES: 0
MYALGIAS: 0
INSOMNIA: 1
BLURRED VISION: 0
DIZZINESS: 0
FEVER: 0
NERVOUS/ANXIOUS: 0
PALPITATIONS: 0
DEPRESSION: 0
HEARTBURN: 1
MEMORY LOSS: 0
COUGH: 0

## 2024-12-09 ASSESSMENT — PAIN DESCRIPTION - PAIN TYPE
TYPE: ACUTE PAIN
TYPE: ACUTE PAIN;SURGICAL PAIN
TYPE: ACUTE PAIN;SURGICAL PAIN
TYPE: SURGICAL PAIN;CHRONIC PAIN
TYPE: ACUTE PAIN

## 2024-12-09 ASSESSMENT — LIFESTYLE VARIABLES: SUBSTANCE_ABUSE: 0

## 2024-12-09 NOTE — PROGRESS NOTES
Message sent to Johnna Valenzuela: Patient had a low BP of 81/40, is fatigued and has dry lips. I just retook his BP and it is much better at 125/73. He has had poor PO intake though, thoughts on starting some fluids on him?

## 2024-12-09 NOTE — ANESTHESIA POSTPROCEDURE EVALUATION
Patient: Christoph Taylor    Procedure Summary       Date: 12/09/24 Room / Location: [facility] ROOM 26 / SURGERY SAME DAY [facility]    Anesthesia Start: 0746 Anesthesia Stop: 0822    Procedure: GASTROSCOPY WITH BIOPSY (Esophagus) Diagnosis: (Erosive Esophagitis, ampullary polyp?)    Surgeons: Radha Ivan M.D. Responsible Provider: Katey Winn M.D.    Anesthesia Type: general ASA Status: 3            Final Anesthesia Type: general  Last vitals  BP   Blood Pressure: (!) 152/90    Temp   36.3 °C (97.3 °F)    Pulse   82   Resp   19    SpO2   99 %      Anesthesia Post Evaluation    Patient location during evaluation: PACU  Patient participation: complete - patient participated  Level of consciousness: awake and alert    Airway patency: patent  Anesthetic complications: no  Cardiovascular status: hemodynamically stable  Respiratory status: acceptable  Hydration status: euvolemic    PONV: none          No notable events documented.     Nurse Pain Score: 0 (NPRS)

## 2024-12-09 NOTE — CARE PLAN
The patient is Stable - Low risk of patient condition declining or worsening    Shift Goals  Clinical Goals: pain management, tolerated diet, safety  Patient Goals: pain control, egd tomorrow  Family Goals: vicente    Progress made toward(s) clinical / shift goals:        Problem: Knowledge Deficit - Standard  Goal: Patient and family/care givers will demonstrate understanding of plan of care, disease process/condition, diagnostic tests and medications  Description: Target End Date:  1-3 days or as soon as patient condition allows    Document in Patient Education    1.  Patient and family/caregiver oriented to unit, equipment, visitation policy and means for communicating concern  2.  Complete/review Learning Assessment  3.  Assess knowledge level of disease process/condition, treatment plan, diagnostic tests and medications  4.  Explain disease process/condition, treatment plan, diagnostic tests and medications  Outcome: Progressing  Note: Patient has been educated on plan of care, medications, and safety. No further questions from patient      Problem: Provide Safe Environment  Goal: Suicide environmental safety, protocols, policies, and practices will be implemented  Description: Target End Date:  resolve day 1    1.  Remove objects or personal belongings that may cause harm or injury to self or others  2.  Dietary tray modifications (paperware)  3.  Provide a safe environment  4.  Render close patient supervision by sustaining observation or awareness of the patient at all times  Outcome: Progressing  Note: Legal hold in place, 1:1 Sitter, all unnecessary medical equipment and personal belongings removed from room.      Problem: Fall Risk  Goal: Patient will remain free from falls  Description: Target End Date:  Prior to discharge or change in level of care    Document interventions on the Lizette Marroquin Fall Risk Assessment    1.  Assess for fall risk factors  2.  Implement fall precautions  Outcome:  Progressing  Note: Patient has been educated on fall risk and safety. Bed is locked and in lowest position, bed alarm on, sitter in room, and frequent rounding done      Problem: Pain - Standard  Goal: Alleviation of pain or a reduction in pain to the patient’s comfort goal  Description: Target End Date:  Prior to discharge or change in level of care    Document on Vitals flowsheet    1.  Document pain using the appropriate pain scale per order or unit policy  2.  Educate and implement non-pharmacologic comfort measures (i.e. relaxation, distraction, massage, cold/heat therapy, etc.)  3.  Pain management medications as ordered  4.  Reassess pain after pain med administration per policy  5.  If opiods administered assess patient's response to pain medication is appropriate per POSS sedation scale  6.  Follow pain management plan developed in collaboration with patient and interdisciplinary team (including palliative care or pain specialists if applicable)  Outcome: Progressing  Note: Patient has been educated on pain scale and medications. Gi cocktail and tylenol given for pain        Patient is not progressing towards the following goals:

## 2024-12-09 NOTE — OP REPORT
OPERATIVE REPORT    PATIENT:   Christoph Taylor   1965       PREOPERATIVE DIAGNOSES/INDICATIONS: odynophagia, nausea and vomiting, upper abdominal pain, gastroparesis    POSTOPERATIVE DIAGNOSIS: erosive esophagitis, hiatal hernia, ampullary prominence    PROCEDURE:  ESOPHAGOGASTRODUODENOSCOPY with biopsies    PHYSICIAN:  Radha Ivan MD    ANESTHESIA:  Per anesthesiologist.    LOCATION: [facility]    CONSENT:  OBTAINED. The risks, benefits and alternatives of the procedure were discussed in details. The risks include and are not limited to bleeding, infection, perforation, missed lesions, and sedations risks (cardiopulmonary compromise and allergic reaction to medications).    DESCRIPTION: The patient presented to the procedure room.  After routine checkup was performed, patient was brought into the endoscopy suite.  Patient was intubated by anesthesia.  A bite block was placed into patient's mouth.  Patient was placed on his left lateral decubitus position. . Upper endoscope was inserted into patient's mouth and advanced to the second portion of the duodenum under direct visualization.      Once the site was reached and examined, the upper endoscope was withdrawn.  Retroflexion was made within the stomach.  The stomach was decompressed, scope was withdrawn and the procedure was terminated.     The patient tolerated the procedure well.  There were no immediate complications.    OPERATIVE FINDINGS:    1. Esophagus: 30-38cm circumferential white mucosa consistent with LA class D erosive esoophagitis.  Biopsies taken to ensure no presence of yeast esophagitis jar B  2. Stomach: 38-42cm hiatal hernia, pylorus without stenosis  3. Duodenum: prominent ampulla, biopsies taken jar A    EBL: none    RECOMMENDATIONS:  -Follow up path  -Will need PPI BID x 8 weeks then once daily indefinitely  -Carafate slurry QID x 2 weeks then stop  -Gastroparesis diet  -Good control of diabetes  -Reglan or Erythromycin only if he  has good PCP or GI follow up  -Highly emphasize follow up with GI for EGD in 8+ weeks to document healing of esophagitis and no presence of Pereira's    -No further recommendations.  Signing off but please call if questions

## 2024-12-09 NOTE — NON-PROVIDER
This is a medical student note, intended for learning and teaching purposes only. Please see resident note for complete plan of care.    [facility] Daily [facility] Note      Attending: Dashawn Dobson M.D.  Student: Lilia Rick, MS3  Patient: Christoph Taylor; [id number]; 1965    Chief Complaint   Intractable abdominal pain, weakness, and SI     Hospital Course  Christoph Taylor is a 59 y.o. male admitted 12/6 for non-DKA hyperglycemia, intractable abdominal pain, and suicidal ideation without intent. HD #4, awaiting placement for IP psychiatry.       Interval Problem Update  12/7:  - Psychiatry consulted and recommended extension of legal-hold, initiation of Effexor 37.5 mg, transfer to inpatient psychiatric hospital upon medical clearance, and B12/Vit D to W/U MDD.   - Dilaudid Dc'd due to hypersomnolence  - initiation of Omeprazole 40 mg PO, Famotidine 20 mg IV, and Tamsulosin 0.4 mg PO    12/8:  - Dc'd oxycodone  - sleep study OP for WILLIAM    12/9:  - EGD completed  - Reglan 5 mg PO TID initiated     Consultants/Specialty  GI  Wound Care  PT/OT  Nutrition  Diabetic education    Review of Systems  Unable to complete as pt was in OR for EGD. Please refer to resident physician's note.         Vitals  Vitals:    12/08/24 0804 12/08/24 1555 12/08/24 1957 12/09/24 0344   BP: 137/81 (!) 81/50 113/75 131/78   Pulse: 90 84 91 84   Resp: 17 17 18 18   Temp: 36.5 °C (97.7 °F) 36.5 °C (97.7 °F) 36.9 °C (98.4 °F) 36.4 °C (97.5 °F)   TempSrc: Temporal Temporal Temporal Temporal   SpO2: 99% 97% 98% 96%   Weight:       Height:         Physical Exam  Unable to complete as pt was in OR for EGD. Please refer to resident physician's note.     Fluids    Intake/Output Summary (Last 24 hours) at 12/9/2024 0625  Last data filed at 12/9/2024 0400  Gross per 24 hour   Intake 1040 ml   Output 1575 ml   Net -535 ml       Laboratory  None new to discuss.      Imaging/Procedure Review  EGD this AM:    OPERATIVE FINDINGS:     1.  Esophagus: 30-38cm circumferential white mucosa consistent with LA class D erosive esoophagitis.  Biopsies taken to ensure no presence of yeast esophagitis jar B  2. Stomach: 38-42cm hiatal hernia, pylorus without stenosis  3. Duodenum: prominent ampulla, biopsies taken jar A      Medications  Current Facility-Administered Medications   Medication Last Admin    omeprazole (PriLOSEC) capsule 40 mg 40 mg at 12/09/24 0431    GI Cocktail (hyoscyamine-lidocaine-Maalox) oral susp cup 30 mL 30 mL at 12/08/24 2138    NS infusion New Bag at 12/09/24 0552    tamsulosin (Flomax) capsule 0.4 mg 0.4 mg at 12/08/24 1014    lidocaine (Asperflex) 4 % patch 1 Patch 1 Patch at 12/08/24 1326    [Held by provider] sucralfate (Carafate) 1 GM/10ML suspension 1 g 1 g at 12/08/24 1326    Respiratory Therapy Consult      enoxaparin (Lovenox) inj 40 mg 40 mg at 12/08/24 1706    acetaminophen (Tylenol) tablet 650 mg 650 mg at 12/09/24 0047    Pharmacy Consult Request ...Pain Management Review 1 Each      senna-docusate (Pericolace Or Senokot S) 8.6-50 MG per tablet 2 Tablet 2 Tablet at 12/08/24 1705    And    polyethylene glycol/lytes (Miralax) Packet 1 Packet      hydrALAZINE (Apresoline) injection 10 mg      ondansetron (Zofran) syringe/vial injection 4 mg      ondansetron (Zofran ODT) dispertab 4 mg      promethazine (Phenergan) tablet 12.5-25 mg      promethazine (Phenergan) suppository 12.5-25 mg      prochlorperazine (Compazine) injection 5-10 mg      guaiFENesin dextromethorphan (Robitussin DM) 100-10 MG/5ML syrup 10 mL      insulin lispro (HumaLOG,AdmeLOG) subcutaneous injection 4 Units at 12/08/24 2138    And    dextrose 50% (D50W) injection 25 g      aspirin EC tablet 81 mg 81 mg at 12/09/24 0431    cyclobenzaprine (Flexeril) tablet 5 mg      gabapentin (Neurontin) capsule 100 mg 100 mg at 12/09/24 0431    insulin GLARGINE (Lantus,Semglee) injection 20 Units at 12/08/24 1920    loperamide (Imodium) capsule 2 mg         Assessment  and Plan:    Christoph Taylor is a 60 YO M admitted 12/6 for intractable abdominal pain, hyperglycemia (non-DKA), and SI. We believe his abdominal pain is secondary to severe gastroparesis induced esophagitis given his poor glycemic control over the years. His admission is focused on many social barriers preventing him from attaining good glycemic control, his living status, social support, and physical disabilities that prevent him from injecting his insulin properly. Barriers to discharge include awaiting psychiatric placement and education from diabetic educator, nutrition (gastroparesis diet), and PT/OT.         # Acute respiratory failure (HCC) (present on admission), status: stable  # Sleep apnea- (present on admission), status: stable  Currently on 5 L NC overnight for concern of WILLIAM apneic episodes, saturates above 90% on RA in daytime.   Chest x-ray is negative for pneumonia,CTA of pulmonary artery negative for PE.  Consulting clinic on 7/2024 was normal.  Stress test in October 2024 was normal   he was given ceftriaxone empirically in the ER, however he has procalcitonin negative     - sleep study for CPAP placement OP  - titrate sats 92-96%  - spot pulse ox, wean off oxygen        # Intractable Abdominal pain (present on admission), status: trending to improvement  # Severe gastroparesis, secondary to poor glycemic control  # Severe erosive esophagitis, secondary to severe gastroparesis   5-month history of LUQ abdominal pain associated with meals and relieved with GI cocktail and PPI/H2/Sucralfate therapy. Pt is non-compliant with home Prilosec 20 mg QDAY. No associated CP or SOB to indicate cardiac cause, EKG is SR with no signs of ischemic changes. EGD 2023 shows evidence of esophagitis and pt is non-compliant with treatment plan. Pt was never followed up on for EGD in March of 2023, and reports weight loss with increased pain suggesting possible transformation of esophagitis or risk of malignancy.   EGD  today reveals:   1. Esophagus: 30-38cm circumferential white mucosa consistent with LA class D erosive esoophagitis.  Biopsies taken to ensure no presence of yeast esophagitis jar B  2. Stomach: 38-42cm hiatal hernia, pylorus without stenosis  3. Duodenum: prominent ampulla, biopsies taken jar A  It is reasonable given his physical exam findings, EGD results, and response to the GI cocktail/PPI/H2 blocker that his pain is a result of long-term GI insult from poor glycemic control.     - nutrition pending   - Reglan 5 mg PO TID initiated     GI recs:  -Follow up path  -Will need PPI BID x 8 weeks then once daily indefinitely  -Carafate slurry QID x 2 weeks then stop  -Gastroparesis diet  -Good control of diabetes  -Reglan or Erythromycin only if he has good PCP or GI follow up  -Highly emphasize follow up with GI for EGD in 8+ weeks to document healing of esophagitis and no presence of Pereira's      # Microcytic anemia, secondary to chronic erosive esophagitis  Today's Hgb increased from 9.4 to 9.8, (baseline around 11-12). MCV 80.4.  Poor duodenal absorption of iron suspected by GI.      - iron studies pending       # Hyperglycemia secondary to medication non-compliance, non-DKA (present on admission), status: resolved  FSG on admission 459, currently 114. Pt is on Glargine 20 U bedtime and Aspart 3-14 U, but pt is non-compliant. Claims he is T2DM but chart review also report T1DM.      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 He reports no intent or plan. Legal hold was placed with sitter in place, psychiatry have decided to extend  legal hold until placement.      - continue Effexor 37.5 mg  - await placement following medical clearance     # Acute urinary retention, most likely secondary to neurogenic bladder vs. BPH new-onset, status: stable  Bladder scan 12/6 showed retention of 1 L. Pt reports recent onset of weak urinary stream and dribbling. Crowley was placed following bladder scan.      - continue Tamsulosin   - voiding trial today     # Leukocytosis- (present on admission), status: resolved  Perhaps it is reactive. WBC 14.2 on admission  The patient empirically received ceftriaxone in ER  Possibly reactive secondary to hyperglycemia or esophagitis.  CT of the abdomen from 12/06 showed possible esophagitis.  Chest x-ray negative for pneumonia. CTA chest negative for PE.   UA neg Nitrites and LE.   Procalcitonin is not elevated.  We will defer antibiotics for now.        Ppx:   DVT= Lovenox active  PPI= active  TELE= discontinued          Lilia Rick, Student

## 2024-12-09 NOTE — ANESTHESIA PREPROCEDURE EVALUATION
Case: [id number] Date/Time: 12/09/24 1124    Procedure: GASTROSCOPY (Esophagus)    Anesthesia type: General    Pre-op diagnosis: Question of esophageal malignancy    Location: [facility] ROOM 26 / SURGERY SAME DAY [facility]    Surgeons: Radha Ivan M.D.            Relevant Problems   ANESTHESIA   (positive) Sleep apnea      PULMONARY   (positive) Multifocal pneumonia      CARDIAC   (negative) Dysrhythmia   (negative) MI (myocardial infarction) (HCC)   (negative) Stented coronary artery         (positive) CKD (chronic kidney disease)      ENDO   (positive) Type 2 diabetes mellitus (HCC)   (positive) Uncontrolled type 2 diabetes mellitus with hyperglycemia (HCC)      Other   (positive) Acute respiratory failure (HCC)   (positive) History of osteomyelitis of right hand (HCC)   (positive) Thoracic spondylosis without myelopathy   (positive) Type 2 diabetes mellitus with hyperosmolar nonketotic hyperglycemia (HCC)       Physical Exam    Airway   Mallampati: II  TM distance: >3 FB  Neck ROM: full       Cardiovascular - normal exam  Rhythm: regular  Rate: normal  (-) murmur     Dental - normal exam           Pulmonary - normal exam  Breath sounds clear to auscultation     Abdominal    Neurological - normal exam                   Anesthesia Plan    ASA 3   ASA physical status 3 criteria: diabetes - poorly controlled    Plan - general       Airway plan will be ETT          Induction: intravenous    Postoperative Plan: Postoperative administration of opioids is intended.    Pertinent diagnostic labs and testing reviewed    Informed Consent:    Anesthetic plan and risks discussed with patient.    Use of blood products discussed with: patient whom consented to blood products.

## 2024-12-09 NOTE — PROGRESS NOTES
[facility] Internal Medicine Daily Progress Note    Date of Service  12/9/2024    [employer] Team: R IM Blue Team   Attending: Dashawn Dobson M.d.  Senior Resident: Jarvis Girard  Intern:  Johnna Argueta  Contact Number: [phone]    Chief Complaint  Christoph Taylor is a 59 y.o. male admitted 12/6/2024 with intractable abdominal pain.    Hospital Course  Christoph Taylor is a 59 year old male patient with PMHx of T2DM with neuropathy (HbA1C: 13), severe LA class D oesophagitis, CKD, HLD, Chronic lower back and neck pain with motor neuropathy of upper B/L limbs, and previous hospitalization for DKA, recurrent oesophagitis presented to the hospital on 12/6/2024 via [facility] for intractable abdominal pain, hyperglycemia and suicidal ideation. He was recently seen in ED on 12/5 for similar abdominal pain, and sent home when no acute abnormalities were detected.     On this admission his abdominal pain is located to the same spot, left upper quadrant under his ribs. Symptomatologies are in keeping with likely peptic ulcer disease as the pain is trigger by oral intake, and relieved with anti-acids. His last EGD was done in 3/2023 which showed severe oesophagitis.    His poorly controlled diabetes is a result of noncompliance which is due to poor understanding and fear of insulin therapy (which had previously resulted in reported cardiac arrest). Diabetic education has been consulted.    During hospitalization patient expressed suicidal thoughts, in the context of severe depression and hopelessness given all his medical conditions he is trying to juggle. Legal hold was placed and recommendation by psychiatry to start Effexor for mood and transfer to inpatient psychiatric hospital when medically cleared.    Interval Problem Update  Overnight events:  Patient had no significant overnight events.    12/9:  This morning, He reports feeling better and reports improvement in his postprandial pain with the GI cocktail.    -He  underwent an EGD today, which showed esophageal findings consistent with LA class D erosive esophagitis, Hiatal hernia and prominent duodenal ampullae.  -Patient started on Metoclopramide 5 mg PO TID for gastroparesis  -His jiménez catheter was removed today and he's currently on a voiding trial    I have discussed this patient's plan of care and discharge plan at IDT rounds today with Case Management, Nursing, Nursing leadership, and other members of the IDT team.    Consultants/Specialty  psychiatry    Code Status  Full Code    Disposition  The patient is not medically cleared for discharge to home or a post-acute facility.      I have placed the appropriate orders for post-discharge needs.    Review of Systems  Review of Systems   Constitutional:  Positive for malaise/fatigue and weight loss. Negative for chills and fever.   HENT:  Negative for hearing loss.    Eyes:  Negative for blurred vision.   Respiratory:  Negative for cough, hemoptysis and shortness of breath.    Cardiovascular:  Negative for chest pain and palpitations.   Gastrointestinal:  Positive for abdominal pain, heartburn and nausea. Negative for constipation, diarrhea and vomiting.   Genitourinary:  Negative for dysuria, hematuria and urgency.   Musculoskeletal:  Negative for myalgias.   Neurological:  Negative for dizziness and headaches.   Psychiatric/Behavioral:  Negative for depression.         Physical Exam  Temp:  [36.1 °C (96.9 °F)-36.9 °C (98.4 °F)] 36.8 °C (98.2 °F)  Pulse:  [69-93] 89  Resp:  [16-19] 17  BP: ()/() 139/82  SpO2:  [93 %-100 %] 93 %    Physical Exam  Constitutional:       General: He is not in acute distress.  HENT:      Mouth/Throat:      Mouth: Mucous membranes are moist.   Eyes:      Pupils: Pupils are equal, round, and reactive to light.   Cardiovascular:      Rate and Rhythm: Normal rate and regular rhythm.      Pulses: Normal pulses.      Heart sounds: No murmur heard.  Pulmonary:      Effort: Pulmonary  effort is normal. No respiratory distress.      Breath sounds: No wheezing.   Abdominal:      General: Abdomen is flat.      Palpations: Abdomen is soft.      Tenderness: There is abdominal tenderness (Left upper quadrant).   Musculoskeletal:         General: Deformity (Left foot) present. Normal range of motion.   Skin:     Capillary Refill: Capillary refill takes less than 2 seconds.   Neurological:      General: No focal deficit present.      Mental Status: He is alert and oriented to person, place, and time.      Motor: No weakness.   Psychiatric:      Comments: depressed         Fluids    Intake/Output Summary (Last 24 hours) at 12/9/2024 1554  Last data filed at 12/9/2024 0822  Gross per 24 hour   Intake 1040 ml   Output 1275 ml   Net -235 ml       Laboratory  Recent Labs     12/07/24  0222 12/08/24  0355 12/09/24  0338   WBC 10.4 6.0 4.6*   RBC 4.15* 3.62* 3.78*   HEMOGLOBIN 10.7* 9.4* 9.8*   HEMATOCRIT 33.0* 29.1* 30.6*   MCV 79.5* 80.4* 81.0*   MCH 25.8* 26.0* 25.9*   MCHC 32.4 32.3 32.0*   RDW 37.9 39.1 39.2   PLATELETCT 251 206 182   MPV 9.2 9.3 9.4     Recent Labs     12/06/24  1654 12/07/24  0222 12/08/24  0355   SODIUM 132* 134* 137  136   POTASSIUM 4.6 4.0 3.8  3.7   CHLORIDE 92* 96 100  100   CO2 22 26 26  29   GLUCOSE 459* 203* 183*  188*   BUN 31* 25* 21  21   CREATININE 1.12 0.96 0.89  0.84   CALCIUM 8.6 8.8 8.1*  8.0*                   Imaging  CT-CTA CHEST PULMONARY ARTERY W/ RECONS   Final Result      1.  No CT evidence of pulmonary emboli.      2.  Mild wedge type compression fracture of T12 vertebral body of indeterminate age likely chronic.            DX-CHEST-LIMITED (1 VIEW)   Final Result         No acute cardiopulmonary abnormalities are identified.           Assessment/Plan  Problem Representation:    * Acute respiratory failure (HCC)- (present on admission)  Assessment & Plan  Patient presented with acute hypoxic respiratory failure of unknown etiology which is currently  resolving.   Chest x-ray done on admission was  negative for pneumonia,CTA of pulmonary artery negative for PE.  Consulting clinic on 7/2024 was normal.  Stress test in October 2024 was normal. He was given ceftriaxone empirically in the ER for pneumonia, however he his procalcitonin was negative. He's currently needing 3L oxygen when sleeping and  2 L when awake.  - possibly due to undiagnosed sleep apnea, okay for oxygen while sleeping.  - wean off oxygen while awake  - Outpatient sleep study    Intractable left upper quadrant abdominal pain- (present on admission)  Assessment & Plan  Patient complains of intractable left upper quadrant abdominal pain which usually occurs with oral intake. relieved with GI cocktail. He has a hx of severe LA class D esophagitis. Last EGD was done on 3/14/2023. He reports significant reduction in his appetite and weight as well.  EGD was done today on 12/9/2024 which showed;  30-38 cm circumferential white mucosa consistent with LA class D erosive oesophagitis.  38-42 cm hiatal hernia, no pyloric stenosis  Prominent Ampullae in the Duodenum  Per GI recs;  -Continue Omeprazole BID for 8 weeks and then daily indefinitely  -Continue  Sucralfate QID 2 weeks and then stop  -Start on metoclopramide 5mg PO TID   -Continue GI cocktail PRN  -Start on Gastroparesis diet  -Follow up with GI for EGD in 8+ weeks for healing of esophagitis and no presence of Pereira's esophagus.        Sleep apnea  Assessment & Plan  Likely, due to desaturations while sleeping  - will need sleep consultation outpatient.   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    Major depressive disorder  Assessment & Plan  Depressive mood In context of several medical  conditions and disabilities impacting quality of life  - psych consulted  - Continue legal hold  per psych  - Start Effexor (venlafaxine) per psych  - Inpatient psych transfer once medically cleared    Suicidal ideations  Assessment & Plan  Patient was placed on legal hold with sitter  - psych consult, appreciate recs  Passive thoughts, No plan  - see major depressive disorder plan    Chronic midline low back pain  Assessment & Plan  CTA chest revealed T12 fracture - likely chronic in nature  - Continue pain management  - Continue lidocaine  - avoid dilaudid  - oxycodone for breakthrough, however will likely discontinue once for sure etiology is gastric    LLQ abdominal pain  Assessment & Plan  It is chronic.  CT of the abdomen from 12/5 did not reveal    Uncontrolled type 2 diabetes mellitus with hyperglycemia (HCC)- (present on admission)  Assessment & Plan  He presented with blood sugar in the 400s due to noncompliance with long-acting insulin.  -Restart Lantus 20 units every evening  -Continue Insulin sliding scale  -Insulin Compliance counseling  -Diabetic educator    CKD (chronic kidney disease)- (present on admission)  Assessment & Plan  Patient had a creatinine 1.12, BUN 31 on presentation and was somewhat depleted intravascularly. He received 1L of IV fluids in the ED. RFTs on repeat shows improvement with Cr: 0.84 and BUN: 21.    -Avoid nephrotoxins  -Continue to monitor    Leukocytosis- (present on admission)  Assessment & Plan  Patient had a WBC 14.2 on admission, likely reactive, 2/2 hyperglycemia or esophagitis as CT-Scan of the abdomen from yesterday showed possible esophagitis.  empirically received ceftriaxone in ER. Chest x-ray negative for pneumonia, CTPE of the chest was negative. UA negative for LE, nitrites, WBC and bacteria. WBC as of 12/8 is at 6.0.  -No antibiotics for now         VTE prophylaxis: enoxaparin ppx    I have performed a physical exam and reviewed and updated ROS and Plan today  (12/9/2024). In review of yesterday's note (12/8/2024), there are no changes except as documented above.

## 2024-12-09 NOTE — OR NURSING
0820: Pt arrived from OR to PACU 5. Connected to monitor. Report received from anesthesia & RN. VSS. Oxygen at 6 via mask. Breaths calm, even, and unlabored.     0825 blood sugar 79  Denies pain or nausea at this time.     0832 pt tolerating water     0848 report to Jatinder RN, placed on transport.     0859 patient transported to room via gurney with transport and sitter, pt on 2L. No personal belongings.

## 2024-12-09 NOTE — ANESTHESIA PROCEDURE NOTES
Airway    Date/Time: 12/9/2024 7:50 AM    Performed by: Katey Winn M.D.  Authorized by: Katey Winn M.D.    Location:  OR  Urgency:  Elective  Indications for Airway Management:  Anesthesia      Spontaneous Ventilation: absent    Sedation Level:  Deep  Preoxygenated: Yes    Patient Position:  Sniffing  Final Airway Type:  Endotracheal airway  Final Endotracheal Airway:  ETT  Cuffed: Yes    Technique Used for Successful ETT Placement:  Direct laryngoscopy    Insertion Site:  Oral  Blade Type:  Ed  Laryngoscope Blade/Videolaryngoscope Blade Size:  3  ETT Size (mm):  7.5  Measured from:  Teeth  ETT to Teeth (cm):  23  Placement Verified by: auscultation and capnometry    Cormack-Lehane Classification:  Grade I - full view of glottis  Number of Attempts at Approach:  1

## 2024-12-09 NOTE — ANESTHESIA TIME REPORT
Anesthesia Start and Stop Event Times       Date Time Event    12/9/2024 0732 Ready for Procedure     0746 Anesthesia Start     0822 Anesthesia Stop          Responsible Staff  12/09/24      Name Role Begin End    Katey Winn M.D. Anesth 0746 0822          Overtime Reason:  no overtime (within assigned shift)    Comments:

## 2024-12-10 LAB
ALBUMIN SERPL BCP-MCNC: 3 G/DL (ref 3.2–4.9)
ALBUMIN/GLOB SERPL: 1.5 G/DL
ALP SERPL-CCNC: 59 U/L (ref 30–99)
ALT SERPL-CCNC: 7 U/L (ref 2–50)
ANION GAP SERPL CALC-SCNC: 5 MMOL/L (ref 7–16)
AST SERPL-CCNC: 10 U/L (ref 12–45)
BILIRUB SERPL-MCNC: <0.2 MG/DL (ref 0.1–1.5)
BUN SERPL-MCNC: 13 MG/DL (ref 8–22)
CALCIUM ALBUM COR SERPL-MCNC: 8.7 MG/DL (ref 8.5–10.5)
CALCIUM SERPL-MCNC: 7.9 MG/DL (ref 8.5–10.5)
CHLORIDE SERPL-SCNC: 105 MMOL/L (ref 96–112)
CO2 SERPL-SCNC: 31 MMOL/L (ref 20–33)
CREAT SERPL-MCNC: 0.55 MG/DL (ref 0.5–1.4)
ERYTHROCYTE [DISTWIDTH] IN BLOOD BY AUTOMATED COUNT: 40 FL (ref 35.9–50)
GFR SERPLBLD CREATININE-BSD FMLA CKD-EPI: 113 ML/MIN/1.73 M 2
GLOBULIN SER CALC-MCNC: 2 G/DL (ref 1.9–3.5)
GLUCOSE BLD STRIP.AUTO-MCNC: 103 MG/DL (ref 65–99)
GLUCOSE BLD STRIP.AUTO-MCNC: 156 MG/DL (ref 65–99)
GLUCOSE BLD STRIP.AUTO-MCNC: 180 MG/DL (ref 65–99)
GLUCOSE BLD STRIP.AUTO-MCNC: 186 MG/DL (ref 65–99)
GLUCOSE BLD STRIP.AUTO-MCNC: 222 MG/DL (ref 65–99)
GLUCOSE BLD STRIP.AUTO-MCNC: 66 MG/DL (ref 65–99)
GLUCOSE SERPL-MCNC: 166 MG/DL (ref 65–99)
HCT VFR BLD AUTO: 26.8 % (ref 42–52)
HGB BLD-MCNC: 8.7 G/DL (ref 14–18)
IRON SATN MFR SERPL: 15 % (ref 15–55)
IRON SERPL-MCNC: 25 UG/DL (ref 50–180)
MAGNESIUM SERPL-MCNC: 1.5 MG/DL (ref 1.5–2.5)
MCH RBC QN AUTO: 26.6 PG (ref 27–33)
MCHC RBC AUTO-ENTMCNC: 32.5 G/DL (ref 32.3–36.5)
MCV RBC AUTO: 82 FL (ref 81.4–97.8)
PLATELET # BLD AUTO: 161 K/UL (ref 164–446)
PMV BLD AUTO: 9.6 FL (ref 9–12.9)
POTASSIUM SERPL-SCNC: 3.7 MMOL/L (ref 3.6–5.5)
PROT SERPL-MCNC: 5 G/DL (ref 6–8.2)
RBC # BLD AUTO: 3.27 M/UL (ref 4.7–6.1)
SODIUM SERPL-SCNC: 141 MMOL/L (ref 135–145)
TIBC SERPL-MCNC: 170 UG/DL (ref 250–450)
UIBC SERPL-MCNC: 145 UG/DL (ref 110–370)
WBC # BLD AUTO: 4 K/UL (ref 4.8–10.8)

## 2024-12-10 PROCEDURE — 97166 OT EVAL MOD COMPLEX 45 MIN: CPT

## 2024-12-10 PROCEDURE — 90837 PSYTX W PT 60 MINUTES: CPT | Performed by: SOCIAL WORKER

## 2024-12-10 PROCEDURE — 36415 COLL VENOUS BLD VENIPUNCTURE: CPT

## 2024-12-10 PROCEDURE — 83550 IRON BINDING TEST: CPT

## 2024-12-10 PROCEDURE — 700102 HCHG RX REV CODE 250 W/ 637 OVERRIDE(OP)

## 2024-12-10 PROCEDURE — 83735 ASSAY OF MAGNESIUM: CPT

## 2024-12-10 PROCEDURE — 97535 SELF CARE MNGMENT TRAINING: CPT

## 2024-12-10 PROCEDURE — 700102 HCHG RX REV CODE 250 W/ 637 OVERRIDE(OP): Performed by: INTERNAL MEDICINE

## 2024-12-10 PROCEDURE — 80053 COMPREHEN METABOLIC PANEL: CPT

## 2024-12-10 PROCEDURE — 82962 GLUCOSE BLOOD TEST: CPT | Mod: 91

## 2024-12-10 PROCEDURE — 97163 PT EVAL HIGH COMPLEX 45 MIN: CPT

## 2024-12-10 PROCEDURE — 700101 HCHG RX REV CODE 250

## 2024-12-10 PROCEDURE — 97602 WOUND(S) CARE NON-SELECTIVE: CPT

## 2024-12-10 PROCEDURE — 700111 HCHG RX REV CODE 636 W/ 250 OVERRIDE (IP): Mod: JZ | Performed by: INTERNAL MEDICINE

## 2024-12-10 PROCEDURE — A9270 NON-COVERED ITEM OR SERVICE: HCPCS

## 2024-12-10 PROCEDURE — 85027 COMPLETE CBC AUTOMATED: CPT

## 2024-12-10 PROCEDURE — 770001 HCHG ROOM/CARE - MED/SURG/GYN PRIV*

## 2024-12-10 PROCEDURE — 51798 US URINE CAPACITY MEASURE: CPT

## 2024-12-10 PROCEDURE — A9270 NON-COVERED ITEM OR SERVICE: HCPCS | Performed by: INTERNAL MEDICINE

## 2024-12-10 PROCEDURE — 83540 ASSAY OF IRON: CPT

## 2024-12-10 PROCEDURE — 99232 SBSQ HOSP IP/OBS MODERATE 35: CPT | Performed by: STUDENT IN AN ORGANIZED HEALTH CARE EDUCATION/TRAINING PROGRAM

## 2024-12-10 RX ADMIN — VENLAFAXINE HYDROCHLORIDE 37.5 MG: 75 TABLET ORAL at 04:07

## 2024-12-10 RX ADMIN — SUCRALFATE ORAL 1 G: 1 SUSPENSION ORAL at 10:34

## 2024-12-10 RX ADMIN — SUCRALFATE ORAL 1 G: 1 SUSPENSION ORAL at 17:34

## 2024-12-10 RX ADMIN — ASPIRIN 81 MG: 81 TABLET, COATED ORAL at 04:08

## 2024-12-10 RX ADMIN — OMEPRAZOLE 40 MG: 20 CAPSULE, DELAYED RELEASE ORAL at 04:07

## 2024-12-10 RX ADMIN — LIDOCAINE HYDROCHLORIDE 30 ML: 20 SOLUTION OROPHARYNGEAL at 19:59

## 2024-12-10 RX ADMIN — LIDOCAINE 1 PATCH: 560 PATCH PERCUTANEOUS; TOPICAL; TRANSDERMAL at 16:10

## 2024-12-10 RX ADMIN — CYCLOBENZAPRINE 5 MG: 10 TABLET, FILM COATED ORAL at 23:40

## 2024-12-10 RX ADMIN — SUCRALFATE ORAL 1 G: 1 SUSPENSION ORAL at 04:19

## 2024-12-10 RX ADMIN — ACETAMINOPHEN 650 MG: 325 TABLET, FILM COATED ORAL at 23:40

## 2024-12-10 RX ADMIN — METOCLOPRAMIDE 5 MG: 10 TABLET ORAL at 17:34

## 2024-12-10 RX ADMIN — GABAPENTIN 100 MG: 100 CAPSULE ORAL at 16:10

## 2024-12-10 RX ADMIN — SUCRALFATE ORAL 1 G: 1 SUSPENSION ORAL at 21:45

## 2024-12-10 RX ADMIN — INSULIN LISPRO 3 UNITS: 100 INJECTION, SOLUTION INTRAVENOUS; SUBCUTANEOUS at 13:11

## 2024-12-10 RX ADMIN — METOCLOPRAMIDE 5 MG: 10 TABLET ORAL at 10:34

## 2024-12-10 RX ADMIN — CYCLOBENZAPRINE 5 MG: 10 TABLET, FILM COATED ORAL at 02:27

## 2024-12-10 RX ADMIN — ENOXAPARIN SODIUM 40 MG: 100 INJECTION SUBCUTANEOUS at 17:34

## 2024-12-10 RX ADMIN — ONDANSETRON 4 MG: 4 TABLET, ORALLY DISINTEGRATING ORAL at 02:28

## 2024-12-10 RX ADMIN — TAMSULOSIN HYDROCHLORIDE 0.4 MG: 0.4 CAPSULE ORAL at 10:34

## 2024-12-10 RX ADMIN — INSULIN LISPRO 3 UNITS: 100 INJECTION, SOLUTION INTRAVENOUS; SUBCUTANEOUS at 17:36

## 2024-12-10 RX ADMIN — INSULIN LISPRO 4 UNITS: 100 INJECTION, SOLUTION INTRAVENOUS; SUBCUTANEOUS at 21:54

## 2024-12-10 RX ADMIN — OMEPRAZOLE 40 MG: 20 CAPSULE, DELAYED RELEASE ORAL at 17:34

## 2024-12-10 RX ADMIN — GABAPENTIN 100 MG: 100 CAPSULE ORAL at 04:08

## 2024-12-10 RX ADMIN — LIDOCAINE HYDROCHLORIDE 30 ML: 20 SOLUTION OROPHARYNGEAL at 05:13

## 2024-12-10 RX ADMIN — GABAPENTIN 100 MG: 100 CAPSULE ORAL at 21:45

## 2024-12-10 RX ADMIN — POLYETHYLENE GLYCOL 3350 1 PACKET: 17 POWDER, FOR SOLUTION ORAL at 04:12

## 2024-12-10 RX ADMIN — ACETAMINOPHEN 650 MG: 325 TABLET, FILM COATED ORAL at 10:33

## 2024-12-10 RX ADMIN — ACETAMINOPHEN 650 MG: 325 TABLET, FILM COATED ORAL at 02:27

## 2024-12-10 RX ADMIN — METOCLOPRAMIDE 5 MG: 10 TABLET ORAL at 04:19

## 2024-12-10 ASSESSMENT — COGNITIVE AND FUNCTIONAL STATUS - GENERAL
TURNING FROM BACK TO SIDE WHILE IN FLAT BAD: A LITTLE
MOVING TO AND FROM BED TO CHAIR: A LITTLE
PERSONAL GROOMING: A LITTLE
DRESSING REGULAR LOWER BODY CLOTHING: A LOT
CLIMB 3 TO 5 STEPS WITH RAILING: A LOT
STANDING UP FROM CHAIR USING ARMS: A LITTLE
DAILY ACTIVITIY SCORE: 16
MOBILITY SCORE: 17
WALKING IN HOSPITAL ROOM: A LITTLE
SUGGESTED CMS G CODE MODIFIER MOBILITY: CK
DAILY ACTIVITIY SCORE: 17
WALKING IN HOSPITAL ROOM: A LOT
DRESSING REGULAR LOWER BODY CLOTHING: A LITTLE
CLIMB 3 TO 5 STEPS WITH RAILING: A LOT
HELP NEEDED FOR BATHING: A LOT
SUGGESTED CMS G CODE MODIFIER MOBILITY: CK
TOILETING: A LOT
MOVING TO AND FROM BED TO CHAIR: A LITTLE
HELP NEEDED FOR BATHING: A LITTLE
EATING MEALS: A LITTLE
SUGGESTED CMS G CODE MODIFIER DAILY ACTIVITY: CK
MOVING FROM LYING ON BACK TO SITTING ON SIDE OF FLAT BED: A LITTLE
TOILETING: A LOT
MOBILITY SCORE: 17
STANDING UP FROM CHAIR USING ARMS: A LITTLE
DRESSING REGULAR UPPER BODY CLOTHING: A LITTLE
SUGGESTED CMS G CODE MODIFIER DAILY ACTIVITY: CK
DRESSING REGULAR UPPER BODY CLOTHING: A LITTLE
MOVING FROM LYING ON BACK TO SITTING ON SIDE OF FLAT BED: A LITTLE
PERSONAL GROOMING: A LITTLE

## 2024-12-10 ASSESSMENT — GAIT ASSESSMENTS
DISTANCE (FEET): 20
ASSISTIVE DEVICE: FRONT WHEEL WALKER
DEVIATION: BRADYKINETIC;SHUFFLED GAIT
GAIT LEVEL OF ASSIST: MINIMAL ASSIST

## 2024-12-10 ASSESSMENT — ENCOUNTER SYMPTOMS
HEARTBURN: 1
CONSTIPATION: 0
DIZZINESS: 0
ABDOMINAL PAIN: 1
WEIGHT LOSS: 1
DEPRESSION: 0
CHILLS: 0
PALPITATIONS: 0
DIARRHEA: 0
SHORTNESS OF BREATH: 0
HEMOPTYSIS: 0
HEADACHES: 0
COUGH: 0
VOMITING: 0
MYALGIAS: 0
NAUSEA: 1
FEVER: 0
BLURRED VISION: 0

## 2024-12-10 ASSESSMENT — PAIN DESCRIPTION - PAIN TYPE
TYPE: ACUTE PAIN
TYPE: CHRONIC PAIN
TYPE: CHRONIC PAIN
TYPE: ACUTE PAIN

## 2024-12-10 ASSESSMENT — PATIENT HEALTH QUESTIONNAIRE - PHQ9
SUM OF ALL RESPONSES TO PHQ9 QUESTIONS 1 AND 2: 6
4. FEELING TIRED OR HAVING LITTLE ENERGY: NEARLY EVERY DAY
2. FEELING DOWN, DEPRESSED, IRRITABLE, OR HOPELESS: NEARLY EVERY DAY
5. POOR APPETITE OR OVEREATING: MORE THAN HALF THE DAYS
6. FEELING BAD ABOUT YOURSELF - OR THAT YOU ARE A FAILURE OR HAVE LET YOURSELF OR YOUR FAMILY DOWN: MORE THAN HALF THE DAYS
SUM OF ALL RESPONSES TO PHQ QUESTIONS 1-9: 19
8. MOVING OR SPEAKING SO SLOWLY THAT OTHER PEOPLE COULD HAVE NOTICED. OR THE OPPOSITE, BEING SO FIGETY OR RESTLESS THAT YOU HAVE BEEN MOVING AROUND A LOT MORE THAN USUAL: NOT AT ALL
9. THOUGHTS THAT YOU WOULD BE BETTER OFF DEAD, OR OF HURTING YOURSELF: SEVERAL DAYS
7. TROUBLE CONCENTRATING ON THINGS, SUCH AS READING THE NEWSPAPER OR WATCHING TELEVISION: MORE THAN HALF THE DAYS
1. LITTLE INTEREST OR PLEASURE IN DOING THINGS: NEARLY EVERY DAY
3. TROUBLE FALLING OR STAYING ASLEEP OR SLEEPING TOO MUCH: NEARLY EVERY DAY

## 2024-12-10 ASSESSMENT — ACTIVITIES OF DAILY LIVING (ADL): TOILETING: INDEPENDENT

## 2024-12-10 NOTE — CONSULTS
Diabetes education: Pt has a hx of Diabetes,  on Lantus and Novolog per med rec and known from last admit. Pt was admitted with blood sugar of 459 and Hga1c of 13.0% from 10/15/24. Pt is currently on a Legal hold.  Pt is currently on Glargine 20 units pm with Lispro per sliding scale coverage ac and hs with blood sugars of 222  (4 units), and 204  (4 units ).  Plan: CDE to follow up with pt tomorrow.

## 2024-12-10 NOTE — CARE PLAN
The patient is Stable - Low risk of patient condition declining or worsening    Shift Goals  Clinical Goals: tolerate diet, monitor BM  Patient Goals: pain management  Family Goals: KATHERINE      Problem: Knowledge Deficit - Standard  Goal: Patient and family/care givers will demonstrate understanding of plan of care, disease process/condition, diagnostic tests and medications  Outcome: Progressing    NOTES: Updated patient on plan of care. Answered patient's questions regarding treatment plan.      Problem: Pain - Standard  Goal: Alleviation of pain or a reduction in pain to the patient’s comfort goal  Outcome: Progressing    NOTES: Assessed patient's pain level. Administered PRN pain medications as ordered.

## 2024-12-10 NOTE — CONSULTS
[facility]  Crisis/Safety Plan    Name:  Christoph Taylor  MRN:  [id number]  Date:  12/10/2024    NOTE: Attempted to complete safety plan with patient. Patient was uncooperative and disengaged in the process. Safety plan incomplete.        Warning signs that a crisis may be developing for me or I may be at risk:  1) Health issues  2)   3)    Coping strategies I can use on my own (relaxation, physical activity, etc):  1) sleep  2)   3)     Ways I can make my environment safe:  1)sleep  2)  3)    Things I want to tell myself when I feel a crisis developin) sleep  2)   3)    People I can contact for support or distraction (and their phone numbers):  1) no body  2)   3)    If I’m not able to reach my support people, or the above strategies don’t help, I can contact the following professionals, agencies, or hotlines:  1) Crisis Call Center ():  [phone] -OR- ([phone]  2) Crisis Text Line ():  Text CARE TO 838872  3)   4)     Farzana Tripathi, Ph.D.

## 2024-12-10 NOTE — TELEPHONE ENCOUNTER
12/6/2024     Patient LVM stating he was just released from [facility] for stomach issues and they had given him morphine and released him. Wondering what he had requesting a call back   [phone]
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
## 2024-12-10 NOTE — CONSULTS
"[facility]    INPATIENT ASSESSMENT    Name: Christoph Taylor  MRN: [id number]  : 1965  Age: 59 y.o.  Date of assessment: 12/10/2024  PCP: YESY Tariq.  Persons in attendance: Patient    HPI: Per Medical Record: \"Christoph Taylor is a 59 y.o. male with history of chronic upper abdominal pain uncontrolled diabetes with DKA's, CKD 3, kidney stones, horseshoe kidney, esophagitis, who presented 2024 with ongoing upper abdominal pain on and off associated with nausea and occasional vomiting.  He presented with the same complaints yesterday and was evaluated to be CT of the abdomen with contrast that showed possible esophagitis, distended urinary bladder and 5 mm nonobstructive kidney stone.  He reported suicidal ideations yesterday, but denies any ideations or plans today  During observation in ER he transiently desaturated to 83% and was placed on 2 L nasal cannula.  Blood work showed WBC 14.2.  COVID-19/influenza negative.  Chest x-ray: Negative.  CT of pulmonary artery was ordered  Blood sugar is in the 400s.  Patient stated he skipped 3 days of Lantus.  Bladder scan revealed 1 L of urine and I ordered Crowley catheter  Treatment in the ER included ceftriaxone and azithromycin for possible early pneumonia, 1 L of Ringer lactate and Tylenol 1 g.  Of note, patient denies cough or shortness of breath.\" Patient was referred tor Behavioral Health due to suicidal statements.    Psychotherapy Session Summary   (as stated by Patient): Christoph Taylor is a 59 year old male, known to this service due to previous hospitalization assessments. Patient was seen sitting up on hospital bed, irritated and easily agitated, however alert, oriented, speech clear, no sign of a thought disorder, angry affect and minimally engaged in the psychotherapy process. Patient reports feelings of depression and loneliness. Patient states he raised his sons alone and \"they don't give a fuck about me\". Patient " "continues, \"I did everything for them and they can care less about me\". Patient continues to ruminate about his sons.  Patient states he has no friends and often spends time alone. Patient is also concerned about the deterioration of his health and the lack of mobility has exacerbated his depression and feelings of hopelessness. Patient states, \"I don't care anymore, because nobody cares about me\". Patient states, \"I just want this pain to go away\". Patient denies current suicidal ideations although admits to making suicidal statements recently. Patient states, \"who cares? I don't care about a legal hold, I don't need my phone anyway, nobody calls me\". Patient states, \"why would I talk to a counselor, they don't care about me\". Patient states, \"they called security on me because I heard the nurses talking about upset and started yelling\". Clinician provided service recovery, however patient states, \"you really don't care how those nurses treat me\".   Patient suffers from severe cognitive distortions, mind reading and mental filtering, seeing only the negative, dismissing anything positive in life stating, \"there is no point, nothing works, and nobody cares what I'm going through, the counselor is just getting paid to do a job, they don't really care\". Patient states he has nothing to live for or look forward.  Clinician processed with patient his feelings helplessness using Cognitive Behavioral therapy to challenge automatic negative self talk and how to manage anxiety and depression related to these feelings and thoughts. Patient states he does not know how to develop a sense of hope. Patient was resistant and continued to show anger. Patient could not engage in safety planning, Clinician discussed with patient ways to start developing hope and positive self talk. Patient states he is challenged in this area and does not believe hope is possible. Patient was referred to Palliative to discuss end of life needs and " ask questions related to advance directives.    Chief Complaint   Patient presents with    Abdominal Pain     X months.  Pt seen here yesterday for similar complaint.      Elevated Glucose     Pt arrives with a FSBG=  429.  He reports he has not taken his lantus x 4 days.  He reports he has not eaten much over last four days.      Suicidal Ideation     Pt reports feeling suicidal last night after being discharged home        CURRENT LIVING SITUATION/SOCIAL SUPPORT: Patient is retired and lives alone. He has three sons who reside in [place]. Patient reports no outside interests and no support system.     BEHAVIORAL HEALTH TREATMENT HISTORY  Does patient/parent report a history of prior behavioral health treatment for patient?   No: Although patient is referred and evaluated by psychiatry during previous admissions.    SAFETY ASSESSMENT - SELF  Does patient acknowledge current or past symptoms of dangerousness to self? yes  Does parent/significant other report patient has current or past symptoms of dangerousness to self? N/A  Does presenting problem suggest symptoms of dangerousness to self? No-patient acknowledges hopelessness but denies current plan for suicide. However due to his inability to complete a safety plan, the hold was not discontinued     SAFETY ASSESSMENT - OTHERS  Does patient acknowledge current or past symptoms of aggressive behavior or risk to others? no  Does parent/significant other report patient has current or past symptoms of aggressive behavior or risk to others?  N\A  Does presenting problem suggest symptoms of dangerousness to others? No      Crisis Safety Plan completed and copy given to patient? Attempted    ABUSE/NEGLECT SCREENING    Does patient report feeling “unsafe” in his/her home, or afraid of anyone?  no  Does patient report any history of physical, sexual, or emotional abuse?  no  Does parent or significant other report any of the above? N\A  Is there evidence of neglect by  "self?  no  Is there evidence of neglect by a caregiver? no  Does the patient/parent report any history of CPS/APS/police involvement related to suspected abuse/neglect or domestic violence? no  Based on the information provided during the current assessment, is a mandated report of suspected abuse/neglect being made?  No    SUBSTANCE USE SCREENING  Yes:  Merlin all substances used in the past 30 days:      Last Use Amount   []   Alcohol     []   Marijuana     []   Heroin     [x]   Prescription Opioids  (used without prescription, for    recreation, or in excess of prescribed amount)     []   Other Prescription  (used without prescription, for    recreation, or in excess of prescribed amount)     []   Cocaine      []   Methamphetamine     []   \"\" drugs (ectasy, MDMA)     []   Other substances        UDS results: opiates, oxycodone  Diagnostic alcohol results: <10.1    What consequences does the patient associate with any of the above substance use and or addictive behaviors? Health problems:     Risk factors for detox (check all that apply):  []  Seizures   []  Diaphoretic (sweating)   []  Tremors   []  Hallucinations   []  Increased blood pressure   []  Decreased blood pressure   []  Other   []  None      [] Patient education on risk factors for detoxification and instructed to return to ER as needed.    MENTAL STATUS              Participation: Limited verbal participation  Grooming: Casual  Orientation: Alert  Behavior: Tense  Eye contact: Limited  Mood: Depressed  Affect: Flat  Thought process: Circumstantial  Thought content: Within normal limits  Speech: Soft  Perception: Within normal limits  Memory:  Recent:  Adequate  Insight: Adequate  Judgment:  Adequate  Other:    Collateral information:   Source:   Significant other present in person:    Significant other by telephone-called his son Nickolas, but he did not answer   [facility]    [facility] Nursing Staff-consulted   [facility]" Record-reviewed   Other: Attending and Resident team     Unable to complete full assessment due to:   Acute intoxication   Patient declined to participate/engage   Patient verbally unresponsive   Significant cognitive deficits   Significant perceptual distortions or behavioral disorganization   Other:             CLINICAL IMPRESSIONS:  Primary:  Major Depressive Disorder  Secondary:  Depression due to other medical condition                                       IDENTIFIED NEEDS/PLAN:  [Trigger DISPOSITION list for any items marked]      Imminent safety risk - self  Imminent safety risk - others     Acute substance withdrawal   Psychosis/Impaired reality testing    x Mood/anxiety   Substance use/Addictive behavior    x Maladaptive behavior   Parent/child conflict     Family/Couples conflict   Biomedical     Housing   Financial      Legal  Occupational/Educational     Domestic violence   Other:     Recommendations and Observation Level:  Sitter: josé miguel Mixon  Phone: Hospital phone okay, family okay with supervision  Visitors: Yes, family okay with supervision  Personal belongings: No    Please consult for palliative for advance directives, end of life care and pain management    Legal Hold: on legal      Please transfer to inpatient psychiatric facility when medically cleared.    Farzana Tripathi, Ph.D., [facility]  12/10/2024   Length of intervention: 60 minutes

## 2024-12-10 NOTE — DOCUMENTATION QUERY
"                                                                         [facility]                                                                       Query Response Note      PATIENT:               BAILEE JACOBSON  ACCT #:                  [id number]  MRN:                     [id number]  :                      1965  ADMIT DATE:       2024 4:34 PM  DISCH DATE:          RESPONDING  PROVIDER #:        [id number]           QUERY TEXT:    History of CKD stage 3  and KRISTAL are documented in the record.  Please clarify the the renal status:    Stages are defined by the [employer] as follows:  CKD Stage I  GFR >= 90 ml / min per 1.73 m2 and persistent albuminuria  CKD Stage 2 GFR between 60 and 89 with persistent albuminuria  CKD Stage 3a GFR between 45 - 59  CKD Stage 3b GFR between 30-44  CKD Stage 4 GFR between 15 and 29   CKD Stage 5 GFR between <15 or End Stage Renal Disease    The patient's Clinical Indicators include:  Clinical Findings:    - H&P- Dr. Bailon:  \" history of chronic upper abdominal pain uncontrolled diabetes with DKA's, CKD 3\"    - PN- Dr. Dobson:  \"59 year old man with poorly controlled DM2 and other med problems admitted with severe epigastric pain and KRISTAL.\"    \"CKD (chronic kidney disease)- (present on admission)  \"Patient had a creatinine 1.12, BUN 31 on presentation and was somewhat depleted intravascularly. He received 1L of IV fluids in the ED. RFTs on repeat shows improvement with Cr: 0.84 and BUN:  21.   -Avoid nephrotoxins  -Continue to monitor\"    Labs trending:  Creatinine: 1.12-0.96-0.89-0.84  eGFR: 50--98  BUN: 31-25-21-21    Risk factors: CKD, KRISTAL, DM2 uncontrolled with hyperglycemia, acute respiratory failure, abd pain,     Treatment:   LR IV bolus 1 L given ; NS IVF - ; labs    Please contact me with any questions:    Mira HUDDLESTON RN CCDS  [facility]  [email or web address]  Mira Perez via Voalte  Options provided:   -- " Chronic kidney disease Stage 2, KRISTAL ruled out   -- Chronic kidney disease Stage 3,  KRISTAL ruled out   -- KRISTAL on Chronic kidney disease stage 2   -- KRISTAL on Chronic kidney disease stage 3   -- Other explanation, Please specify other explanation      Query created by: Mira Perez on 12/9/2024 9:53 AM    RESPONSE TEXT:    Other explanation mild KRISTAL on admission. No CKD          Electronically signed by:  BRENNAN HIGHTOWER MD 12/10/2024 7:53 AM

## 2024-12-10 NOTE — THERAPY
Physical Therapy   Initial Evaluation     Patient Name: Christoph Taylor  Age:  59 y.o., Sex:  male  Medical Record #: [id number]  Today's Date: 12/10/2024     Precautions  Precautions: Fall Risk  Comments: legal hold    Assessment  Patient is 59 y.o. male presenting with intractable abdominal pain, hyperglycemia and SI. He was recently seen in ED on 12/5 for similar abdominal pain. Pt has 15+ hospital visits in the last 10 months. Pt is now s/p EGD 12/9 which showed esophageal findings consistent with LA class D erosive esophagitis, hiatal hernia and prominent duodenal ampullae. Pt with PMH including DM2 with neuropathy, CKD, HLD, chronic lower back and neck pain, and previous hospitalization for DKA, recurrent oesophagitis. Pt is independent with functional mobility at baseline using primarily wc. Lives in [address] [place] alone with no local social support. Reports that he has not walked in the last 4 wks and that EMT was carrying him up/down his EVELIN at home in a chair.    During current session, pt presents with depressed affect, learned helplessness, generalized weakness, impaired balance, and decreased endurance requiring overall min A-CGA for OOB mobility as detailed below. Able to walk 20 ft with FWW and min A, pt did have 1 minor LOB when turning to sit on bed requiring assist to steady. Educated pt about LE therex, alternative living options, and home modifications as below, pt agreeable but verbalized limited motivation to follow through. Encouraged pt to continue mobilizing OOB with nursing as tolerated. Recommend post-acute placement to address these significant functional deficits below baseline. Pt would benefit from continued acute PT services to improve functional mobility prior to d/c.    Plan    Physical Therapy Initial Treatment Plan   Treatment Plan : Bed Mobility, Equipment, Family / Caregiver Training, Gait Training, Manual Therapy, Neuro Re-Education / Balance, Self Care / Home Evaluation, Stair  "Training, Therapeutic Activities, Therapeutic Exercise  Treatment Frequency: 3 Times per Week  Duration: Until Therapy Goals Met    DC Equipment Recommendations: Unable to determine at this time  Discharge Recommendations: Recommend post-acute placement for additional physical therapy services prior to discharge home       Subjective    Pt received resting in bed, agreeable to participate. \"If I walk too far the bone will go through my foot, that's what my podiatrist said.\"     Objective       12/10/24 1109   Initial Contact Note    Initial Contact Note Order Received and Verified, Physical Therapy Evaluation in Progress with Full Report to Follow.   Precautions   Precautions Fall Risk   Comments legal hold   Vitals   O2 Delivery Device Room air w/o2 available   Vitals Comments SpO2 in 90s% throughout. Pt denied wearing O2 at baseline   Pain 0 - 10 Group   Therapist Pain Assessment Post Activity Pain Same as Prior to Activity;Nurse Notified  (no specific c/o pain)   Prior Living Situation   Prior Services None   Housing / Facility 1 Story House   Steps Into Home 5   Rail Both Rail (Steps into Home)   Equipment Owned Front-Wheel Walker;Wheelchair;Other (Comments)  (awaiting delivery of new left AFO (pt reported he underwent fitting ~1 week ago and that he wears it d/t his \"collapsed arch\"))   Lives with - Patient's Self Care Capacity Alone and Unable to Care For Self   Comments Pt denied any social support in the area. Reported that son was ordering groceries for him but is no longer doing so. Sons live in [place]. Pt does not drive. Endorses sleeping on bed vs sofa at home. Reports that he has not walked in the last 4 wks and that EMT was carrying him up/down his EVELIN at home in a chair   Prior Level of Functional Mobility   Bed Mobility Independent   Transfer Status Independent   Ambulation Required Assist   Ambulation Distance limited household, 10 ft max per pt report   Assistive Devices Used Wheelchair   Wheelchair " Independent   Stairs Dependent   Comments pt endorses using wc more often than FWW at home normally, however pt reports that he has not walked in the last 4 wks   History of Falls   History of Falls Yes   Date of Last Fall   (pt endorses last fall 2 wks ago, per chart pt has extensive h/o falls)   Cognition    Cognition / Consciousness X   Level of Consciousness Alert   Safety Awareness Impaired   New Learning Impaired   Comments cooperative, calm, depressed affect, learned helplessness, receptive to lifestyle/home mod suggestions but little motivation to implement them   Active ROM Upper Body   Comments appears to have BUE finger flexion contractures, unable to fully extend fingers but can  FWW   Passive ROM Lower Body   Passive ROM Lower Body WDL   Active ROM Lower Body    Active ROM Lower Body  WDL   Strength Lower Body   Lower Body Strength  X   Gross Strength Generalized Weakness, Equal Bilaterally   Comments functional for short household ambulation   Sensation Lower Body   Comments baseline peripheral neuropathy BLE   Coordination Lower Body    Coordination Lower Body  X   Comments slow and weak   Balance Assessment   Sitting Balance (Static) Fair +   Sitting Balance (Dynamic) Fair   Standing Balance (Static) Fair   Standing Balance (Dynamic) Fair -   Weight Shift Sitting Fair   Weight Shift Standing Fair   Comments FWW   Bed Mobility    Supine to Sit Supervised   Sit to Supine Supervised   Scooting Supervised   Rolling Supervised   Comments HOBE   Gait Analysis   Gait Level Of Assist Minimal Assist   Assistive Device Front Wheel Walker   Distance (Feet) 20   # of Times Distance was Traveled 1   Deviation Bradykinetic;Shuffled Gait   # of Stairs Climbed 0   Comments 1 minor LOB when turning to sit on bed requiring assist to steady   Functional Mobility   Sit to Stand Contact Guard Assist   Bed, Chair, Wheelchair Transfer Contact Guard Assist   Transfer Method Stand Step   Mobility bed>up in room FWW>bed    6 Clicks Assessment - How much HELP from from another person do you currently need... (If the patient hasn't done an activity recently, how much help from another person do you think he/she would need if he/she tried?)   Turning from your back to your side while in a flat bed without using bedrails? 3   Moving from lying on your back to sitting on the side of a flat bed without using bedrails? 3   Moving to and from a bed to a chair (including a wheelchair)? 3   Standing up from a chair using your arms (e.g., wheelchair, or bedside chair)? 3   Walking in hospital room? 3   Climbing 3-5 steps with a railing? 2   6 clicks Mobility Score 17   Edema / Skin Assessment   Edema / Skin  X   Comments large scab on left thumb palmar surface, RN notified   Patient / Family Goals    Patient / Family Goal #1 to use hands functionally again   Short Term Goals    Short Term Goal # 1 Pt will perform supine<>sit from flat bed with SPV to progress bed mobility in 6 visits.   Short Term Goal # 2 Pt will perform sit<>stand and stand step txfer with FWW and SPV to progress OOB mobility in 6 visits.   Short Term Goal # 3 Pt will ambulate 25 ft with FWW and SPV to access home in 6 visits.   Short Term Goal # 4 Pt will navigate 5 stairs with BHR and SPV to access home in 6 visits.   Short Term Goal # 5 Pt will perform wc mobility 150 ft with SPV to access home in 6 visits.   Education Group   Education Provided Role of Physical Therapist;Exercises - Supine;Exercises - Seated   Role of Physical Therapist Patient Response Patient;Acceptance;Explanation;Demonstration;Verbal Demonstration;Action Demonstration;Reinforcement Needed   Exercises - Supine Patient Response Patient;Acceptance;Explanation;Demonstration;Verbal Demonstration;Reinforcement Needed   Exercises - Seated Patient Response Patient;Acceptance;Explanation;Demonstration;Verbal Demonstration;Action Demonstration;Reinforcement Needed   Additional Comments educated pt about seated  and supine therex (ankle pumps, LAQ, marches, heel slides) for additional strengthening opportunities outside of therapy; possible alternative living options such as moving closer to sons in [place] for increased support; home modifications ie moving microwave or getting new microwave to place at counter height rather than above the stove. Pt agreeable but verbalized limited motivation to follow through   Physical Therapy Initial Treatment Plan    Treatment Plan  Bed Mobility;Equipment;Family / Caregiver Training;Gait Training;Manual Therapy;Neuro Re-Education / Balance;Self Care / Home Evaluation;Stair Training;Therapeutic Activities;Therapeutic Exercise   Treatment Frequency 3 Times per Week   Duration Until Therapy Goals Met   Problem List    Problems Impaired Bed Mobility;Impaired Transfers;Impaired Ambulation;Functional ROM Deficit;Functional Strength Deficit;Impaired Balance;Decreased Activity Tolerance;Safety Awareness Deficits / Cognition   Anticipated Discharge Equipment and Recommendations   DC Equipment Recommendations Unable to determine at this time   Discharge Recommendations Recommend post-acute placement for additional physical therapy services prior to discharge home   Interdisciplinary Plan of Care Collaboration   IDT Collaboration with  Nursing;Occupational Therapist   Patient Position at End of Therapy In Bed;Bed Alarm On;Call Light within Reach;Tray Table within Reach;Phone within Reach;Other (Comments)  (telesitter in room)   Collaboration Comments RN and OT updated   Session Information   Date / Session Number  12/10- 1(1/3, 12/16)

## 2024-12-10 NOTE — PROGRESS NOTES
[facility] [place] Internal Medicine Daily Progress Note    Date of Service  12/10/2024    R Team: R IM Blue Team   Attending: Teresa Tidwell M.d.  Senior Resident: Jarvis Girard  Intern:  Johnna Argueta  Contact Number: [phone]    Chief Complaint  Christoph Taylor is a 59 y.o. male admitted 12/6/2024 with intractable abdominal pain.    Hospital Course  Christoph Taylor is a 59 year old male patient with PMHx of T2DM with neuropathy (HbA1C: 13), severe LA class D oesophagitis, CKD, HLD, Chronic lower back and neck pain with motor neuropathy of upper B/L limbs, and previous hospitalization for DKA, recurrent oesophagitis presented to the hospital on 12/6/2024 via [facility] for intractable abdominal pain, hyperglycemia and suicidal ideation. He was recently seen in ED on 12/5 for similar abdominal pain, and sent home when no acute abnormalities were detected.     On this admission his abdominal pain is located to the same spot, left upper quadrant under his ribs. Symptomatologies are in keeping with likely peptic ulcer disease as the pain is trigger by oral intake, and relieved with anti-acids. His last EGD was done in 3/2023 which showed severe oesophagitis.    His poorly controlled diabetes is a result of noncompliance which is due to poor understanding and fear of insulin therapy (which had previously resulted in reported cardiac arrest). Diabetic education has been consulted.    During hospitalization patient expressed suicidal thoughts, in the context of severe depression and hopelessness given all his medical conditions he is trying to juggle. Legal hold was placed and recommendation by psychiatry to start Effexor for mood and transfer to inpatient psychiatric hospital when medically cleared.    12/9:  He underwent an EGD on 12/9, which showed esophageal findings consistent with LA class D erosive esophagitis, Hiatal hernia and prominent duodenal ampullae.  -Patient started on Metoclopramide 5 mg PO TID for  gastroparesis  -His jiménez catheter was removed yesterday and he passed his voiding trial.    Interval Problem Update  Overnight events:  Patient was agitated during the night and was verbally aggressive. He was paranoid that the nurses at the station are talking about him. He was reassured by his primary RN and charge RN that no nurses are talking about him at the nurses station but continued to be verbally aggressive. Charge RN called security to reinforce expectations and boundaries and patient calmed down after talking to them.     12/10:  This morning, He reports mild pain in his abdomen post meals which improves with GI cocktail and 2 episodes of diarrhea since yesterday.    -Patient was seen by Psychiatry today and as per their assessment he will continue to be on legal hold.  -Palliative Consult was put for pain management and advance care planning but as per palliative, considering he is on legal hold, He is not legally bill to participate in goals of care/advance care planning discussion.  -His blood sugar levels have been low overnight with a range of  mg therefore, His Insulin Glargine has been reduced to 17 U from 20 U.    I have discussed this patient's plan of care and discharge plan at IDT rounds today with Case Management, Nursing, Nursing leadership, and other members of the IDT team.    Consultants/Specialty  psychiatry    Code Status  Full Code    Disposition  The patient is not medically cleared for discharge to home or a post-acute facility.      I have placed the appropriate orders for post-discharge needs.    Review of Systems  Review of Systems   Constitutional:  Positive for malaise/fatigue and weight loss. Negative for chills and fever.   HENT:  Negative for hearing loss.    Eyes:  Negative for blurred vision.   Respiratory:  Negative for cough, hemoptysis and shortness of breath.    Cardiovascular:  Negative for chest pain and palpitations.   Gastrointestinal:  Positive for abdominal  pain, heartburn and nausea. Negative for constipation, diarrhea and vomiting.   Genitourinary:  Negative for dysuria, hematuria and urgency.   Musculoskeletal:  Negative for myalgias.   Neurological:  Negative for dizziness and headaches.   Psychiatric/Behavioral:  Negative for depression.         Physical Exam  Temp:  [36 °C (96.8 °F)-36.9 °C (98.4 °F)] 36.9 °C (98.4 °F)  Pulse:  [77-87] 81  Resp:  [16-18] 16  BP: (130-146)/(75-89) 141/89  SpO2:  [96 %-99 %] 97 %    Physical Exam  Constitutional:       General: He is not in acute distress.  HENT:      Mouth/Throat:      Mouth: Mucous membranes are moist.   Eyes:      Pupils: Pupils are equal, round, and reactive to light.   Cardiovascular:      Rate and Rhythm: Normal rate and regular rhythm.      Pulses: Normal pulses.      Heart sounds: No murmur heard.  Pulmonary:      Effort: Pulmonary effort is normal. No respiratory distress.      Breath sounds: No wheezing.   Abdominal:      General: Abdomen is flat.      Palpations: Abdomen is soft.      Tenderness: There is abdominal tenderness (Left upper quadrant).   Musculoskeletal:         General: Deformity (Left foot) present. Normal range of motion.   Skin:     Capillary Refill: Capillary refill takes less than 2 seconds.   Neurological:      General: No focal deficit present.      Mental Status: He is alert and oriented to person, place, and time.      Motor: No weakness.   Psychiatric:      Comments: depressed         Fluids    Intake/Output Summary (Last 24 hours) at 12/10/2024 1502  Last data filed at 12/10/2024 0800  Gross per 24 hour   Intake 2040 ml   Output 1055 ml   Net 985 ml       Laboratory  Recent Labs     12/08/24  0355 12/09/24  0338 12/10/24  0138   WBC 6.0 4.6* 4.0*   RBC 3.62* 3.78* 3.27*   HEMOGLOBIN 9.4* 9.8* 8.7*   HEMATOCRIT 29.1* 30.6* 26.8*   MCV 80.4* 81.0* 82.0   MCH 26.0* 25.9* 26.6*   MCHC 32.3 32.0* 32.5   RDW 39.1 39.2 40.0   PLATELETCT 206 182 161*   MPV 9.3 9.4 9.6     Recent Labs      12/08/24  0355 12/10/24  0138   SODIUM 137  136 141   POTASSIUM 3.8  3.7 3.7   CHLORIDE 100  100 105   CO2 26  29 31   GLUCOSE 183*  188* 166*   BUN 21  21 13   CREATININE 0.89  0.84 0.55   CALCIUM 8.1*  8.0* 7.9*                   Imaging  CT-CTA CHEST PULMONARY ARTERY W/ RECONS   Final Result      1.  No CT evidence of pulmonary emboli.      2.  Mild wedge type compression fracture of T12 vertebral body of indeterminate age likely chronic.            DX-CHEST-LIMITED (1 VIEW)   Final Result         No acute cardiopulmonary abnormalities are identified.           Assessment/Plan  Problem Representation:    * Acute respiratory failure (HCC)- (present on admission)  Assessment & Plan  Patient presented with acute hypoxic respiratory failure of unknown etiology which is currently resolving.   Chest x-ray done on admission was  negative for pneumonia,CTA of pulmonary artery negative for PE.  Consulting clinic on 7/2024 was normal.  Stress test in October 2024 was normal. He was given ceftriaxone empirically in the ER for pneumonia, however he his procalcitonin was negative. He's currently on 1 L of oxygen while sleeping.  - possibly due to undiagnosed sleep apnea, okay for oxygen while sleeping.  - wean off oxygen while awake  - Outpatient sleep study  - Home O2 eval    Intractable left upper quadrant abdominal pain- (present on admission)  Assessment & Plan  Patient complains of intractable left upper quadrant abdominal pain which usually occurs with oral intake. relieved with GI cocktail. He has a hx of severe LA class D esophagitis. Last EGD was done on 3/14/2023. He reports significant reduction in his appetite and weight as well.  EGD was done today on 12/9/2024 which showed;  30-38 cm circumferential white mucosa consistent with LA class D erosive oesophagitis.  38-42 cm hiatal hernia, no pyloric stenosis  Prominent Ampullae in the Duodenum  Per GI recs;  -Continue Omeprazole BID for 8 weeks and then  daily indefinitely  -Continue  Sucralfate QID 2 weeks and then stop  -Start on metoclopramide 5mg PO TID   -Continue GI cocktail PRN  -Start on Gastroparesis diet  -Follow up with GI for EGD in 8+ weeks for healing of esophagitis and no presence of Pereira's esophagus.        Sleep apnea  Assessment & Plan  Likely, due to desaturations while sleeping  - will need sleep consultation outpatient.   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    Major depressive disorder  Assessment & Plan  Depressive mood In context of several medical conditions and disabilities impacting quality of life  - psych following  - Continue legal hold  per psych  - Start Effexor (venlafaxine) per psych  - Inpatient psych transfer once medically cleared    Suicidal ideations  Assessment & Plan  Patient was placed on legal hold with sitter  - psych consult, appreciate recs  Passive thoughts, No plan  - see major depressive disorder plan    Chronic midline low back pain  Assessment & Plan  CTA chest revealed T12 fracture - likely chronic in nature  - Continue pain management  - Continue lidocaine  - avoid dilaudid  - oxycodone for breakthrough, however will likely discontinue once for sure etiology is gastric    LLQ abdominal pain  Assessment & Plan  It is chronic.  CT of the abdomen from 12/5 did not reveal    Uncontrolled type 2 diabetes mellitus with hyperglycemia (HCC)- (present on admission)  Assessment & Plan  He presented with blood sugar in the 400s due to noncompliance with long-acting insulin.His blood sugar levels have been low overnight with a range of  mg therefore, His Insulin Glargine has been reduced to 17 U from 20 U.  -Continue Insulin Glargine 17 units every  evening  -Continue Insulin sliding scale  -Insulin Compliance counseling  -Diabetic educator    CKD (chronic kidney disease)- (present on admission)  Assessment & Plan  Patient had a creatinine 1.12, BUN 31 on presentation and was somewhat depleted intravascularly. He received 1L of IV fluids in the ED. RFTs on repeat shows improvement with Cr: 0.84 and BUN: 21.    -Avoid nephrotoxins  -Continue to monitor    Leukocytosis- (present on admission)  Assessment & Plan  Patient had a WBC 14.2 on admission, likely reactive, 2/2 hyperglycemia or esophagitis as CT-Scan of the abdomen from yesterday showed possible esophagitis.  empirically received ceftriaxone in ER. Chest x-ray negative for pneumonia, CTPE of the chest was negative. UA negative for LE, nitrites, WBC and bacteria. WBC as of 12/8 is at 6.0.  -No antibiotics for now         VTE prophylaxis: enoxaparin ppx    I have performed a physical exam and reviewed and updated ROS and Plan today (12/10/2024). In review of yesterday's note (12/9/2024), there are no changes except as documented above.

## 2024-12-10 NOTE — THERAPY
"Occupational Therapy   Initial Evaluation     Patient Name: Christoph Taylor  Age:  59 y.o., Sex:  male  Medical Record #: [id number]  Today's Date: 12/10/2024     Precautions: Fall Risk    Assessment  Patient is 59 y.o. male admitted with intractable abdominal pain, elevated glucose, and SI. Pt seen s/p EGD which revealed esophagitis, class D: biopsies were taken and a hiatal hernia without pyloric stenosis. PMHx of T2DM with neuropathy (HbA1C: 13), severe LA class D oesophagitis, CKD, HLD, Chronic lower back and neck pain with motor neuropathy of upper B/L limbs, and previous hospitalization for DKA, and recurrent oesophagitis. Pt seen for OT eval and tx. Pt currently resides alone in a 1-story house and was independent with ADLs, but required assist with IADLs. Pt reported that Meals on Wheels had ended and up until his admission, his son was having his groceries delivered to his house. Pt reports that not having full function of his hands is impacting his ability to complete ADL and IADL tasks.     During OT eval, pt presented with deficits in self-care tasks, balance, functional mobility, strength, ROM, coordination, and activity tolerance. Upon entry, pt reported that he had had a BM necessitating max A and increased time to complete pericare and linen change. Pt reported an increase in R shoulder pain with shoulder flexion/abduction due to falling on it while getting out of a taxi. Pt with contractures at the PIP joint of B hands. Pt able to complete gross grasp, but demo difficutly maintaining grasp and manipulating objects. While completing STS, pt reported that he could only complete small distances of functional mobility due to his \"podiatrist stating that he cannot walk much on his right foot as a bone may shift downwards and ultimately leading to the loss of his foot.\" Pt reported that he does not have anyone who could bring in his old AFOs while he waits for his new ones. Pt was provided education on " role of acute OT, home safety, compensatory strategies to complete ADLs, verbally introduced AE to improve independence with ADLs, and importance of OOB ADLs. Currently recommend post-acute placement prior to DC home. Will continue to follow for ongoing acute OT services.      Plan    Occupational Therapy Initial Treatment Plan   Treatment Interventions: Self Care / Activities of Daily Living, Adaptive Equipment, Neuro Re-Education / Balance, Therapeutic Exercises, Therapeutic Activity, Family / Caregiver Training  Treatment Frequency: 3 Times per Week  Duration: Until Therapy Goals Met    DC Equipment Recommendations: Unable to determine at this time  Discharge Recommendations: Recommend post-acute placement for additional occupational therapy services prior to discharge home      Objective    Prior Living Situation   Prior Services Home-Independent   Housing / Facility 1 Story House   Steps Into Home 5   Steps In Home 0   Rail Both Rail (Steps into Home)   Bathroom Set up Walk In Shower;Grab Bars;Shower Chair   Equipment Owned Front-Wheel Walker;4-Wheel Walker;Single Point Cane;Wheelchair;Tub / Shower Seat;Grab Bar(s) In Tub / Shower;Hand Held Shower   Lives with - Patient's Self Care Capacity Alone and Unable to Care For Self   Comments Pt currently resides alone. Pt reports that he does not receive Meals on Wheels services anymore and prior to hospitalization his son was ordering him groceries and they were delivered to his home. Pt is unclear how he plans to complete IADLs after DC. Pt reported that he is in the process of getting new AFOs; stated that he is waiting to hear back from  for an additional fitting.   Prior Level of ADL Function   Self Feeding Independent   Grooming / Hygiene Independent   Bathing Independent   Dressing Independent   Toileting Independent  (Reports difficulty with thoroughness with wiping due to hand contractures)   Prior Level of IADL Function   Medication Management Requires  Assist   Laundry Requires Assist   Kitchen Mobility Requires Assist   Finances Requires Assist   Home Management Requires Assist   Shopping Requires Assist   Prior Level Of Mobility Independent With Device in Community;Independent With Device in Home   Driving / Transportation Utilizes Taxi Service for Transportation   History of Falls   History of Falls Yes   Date of Last Fall   (Fell when getting out of a taxi a couple of months ago)   Vitals   O2 (LPM) 1   O2 Delivery Device Silicone Nasal Cannula   Vitals Comments Denies O2 use baseline   Pain 0 - 10 Group   Therapist Pain Assessment Post Activity Pain Same as Prior to Activity;Nurse Notified  (Not rated, reported an increase in abdominal pain with activity)   Cognition    Level of Consciousness Alert   Comments Pleasant and cooperative; verbose and often difficult to redirect   Active ROM Upper Body   Active ROM Upper Body  X   Dominant Hand Right   Comments Pt reported an increase in R shoulder pain with shoulder flexion/abduction due to falling on it while getting out of a taxi. Pt with contractures at the PIP joint of B hands. Pt able to complete gross grasp, but demo difficutly maintaining grasp and manipulating objects.   Strength Upper Body   Upper Body Strength  X   Gross Strength Generalized Weakness, Equal Bilaterally.    Coordination Upper Body   Coordination X   Fine Motor Coordination Impaired BUEs   Balance Assessment   Sitting Balance (Static) Fair +   Sitting Balance (Dynamic) Fair -   Standing Balance (Static) Fair -   Standing Balance (Dynamic) Fair -   Weight Shift Sitting Fair   Weight Shift Standing Fair   Comments w/FWW   Bed Mobility    Supine to Sit Standby Assist   Sit to Supine Standby Assist   Scooting Standby Assist   Rolling Standby Assist   Comments HOB flat; use of rails   ADL Assessment   Grooming Minimal Assist   Upper Body Dressing Minimal Assist   Lower Body Dressing Moderate Assist  (Able to tailor sit to manage socks; demo  "difficulty with coordinaton and manipulation necessitating increased assist)   Toileting Maximal Assist  (Large loose stool necessitating increased time for pericare)   How much help from another person does the patient currently need...   6 Clicks Daily Activity Score 16   Functional Mobility   Sit to Stand Contact Guard Assist   Bed, Chair, Wheelchair Transfer Contact Guard Assist   Mobility EOB; STS x2 with steps towards HOB; declined sitting up in the chair. Reported that he could only complete short gait due to his \"podiatrist stating that he cannot walk much on his right foot as a bone may shift downwards and ultimately leading to the loss of his foot.\"   Activity Tolerance   Sitting Edge of Bed 15 min   Standing 3 min   Patient / Family Goals   Patient / Family Goal #1 To get better   Short Term Goals   Short Term Goal # 1 Pt will complete ADL txfs with supv   Short Term Goal # 2 Pt will complete LB dressing with supv using AE PRN   Short Term Goal # 3 Pt will complete toileting ADLs with supv   Short Term Goal # 4 Pt will complete standing g/h routine with supv   Education Group   Education Provided Home Safety;Activities of Daily Living;Role of Occupational Therapist;Pathology of bedrest;Adaptive Equipment   Role of Occupational Therapist Patient Response Patient;Acceptance;Explanation;Verbal Demonstration   Home Safety Patient Response Patient;Acceptance;Explanation;Verbal Demonstration;Reinforcement Needed   ADL Patient Response Patient;Acceptance;Explanation;Reinforcement Needed   Adaptive Equipment Patient Response Patient;Acceptance;Explanation;Reinforcement Needed   Pathology of Bedrest Patient Response Patient;Acceptance;Explanation;Verbal Demonstration;Action Demonstration;Reinforcement Needed                 "

## 2024-12-10 NOTE — CONSULTS
In Patient Palliative Care  Consult and EMR reviewed.  Consult indicates now chronic pain management and advance care planning discussion.  As a reminder, inpatient palliative medicine generally does not manage chronic pain complaints and not on life limiting diagnosis patient's.  Provided recommendations regarding optimization of pain control to team.     Additionally patient is on legal hold at this time and therefore is not able legally to participate in goals of care/advance care planning discussions.  Provided recommendations to team and encouraged to reconsult once legal hold lifted for goals of care/ACP discussion.    Thank you,   Olivia Ignacio, MSN, APRN, ACNPC-AG

## 2024-12-10 NOTE — CARE PLAN
The patient is Stable - Low risk of patient condition declining or worsening    Shift Goals  Clinical Goals: tolerate diet; pain/nausea management; safety  Patient Goals: pain management; privacy  Family Goals: KATHERINE    Progress made toward(s) clinical / shift goals:    Problem: Knowledge Deficit - Standard  Goal: Patient and family/care givers will demonstrate understanding of plan of care, disease process/condition, diagnostic tests and medications  Outcome: Progressing  Note: Patient initially agitated and verbally aggressive because he thinks that the tele-sitter is recording him and videos of him would be uploaded to the internet. Patient was also paranoid that the nurses at the nurses station are talking and making fun of him. Primary RN and Charge RN given education on rationale of current treatment plan and that there are no nurses that are talking about him at the nurses station. Patient continued to be verbally aggressive and yelling at staff. Charge RN called security to reinforce boundaries and expectations. Primary RN notified Dr. Elder. Patient did calm down after talking to security but continues to think that tele-sitter camera could be hacked and that he would prefer not to have it on.      Problem: Provide Safe Environment  Goal: Suicide environmental safety, protocols, policies, and practices will be implemented  Outcome: Progressing  Note: Tele-sitter at bedside. Bed alarm on. Fall precautions in place.

## 2024-12-11 PROBLEM — K31.84 GASTROPARESIS: Status: ACTIVE | Noted: 2024-12-11

## 2024-12-11 LAB
ALBUMIN SERPL BCP-MCNC: 3.1 G/DL (ref 3.2–4.9)
ALBUMIN/GLOB SERPL: 1.4 G/DL
ALP SERPL-CCNC: 62 U/L (ref 30–99)
ALT SERPL-CCNC: <5 U/L (ref 2–50)
ANION GAP SERPL CALC-SCNC: 5 MMOL/L (ref 7–16)
AST SERPL-CCNC: 6 U/L (ref 12–45)
BILIRUB SERPL-MCNC: <0.2 MG/DL (ref 0.1–1.5)
BUN SERPL-MCNC: 14 MG/DL (ref 8–22)
CALCIUM ALBUM COR SERPL-MCNC: 8.9 MG/DL (ref 8.5–10.5)
CALCIUM SERPL-MCNC: 8.2 MG/DL (ref 8.5–10.5)
CHLORIDE SERPL-SCNC: 102 MMOL/L (ref 96–112)
CO2 SERPL-SCNC: 32 MMOL/L (ref 20–33)
CREAT SERPL-MCNC: 0.53 MG/DL (ref 0.5–1.4)
ERYTHROCYTE [DISTWIDTH] IN BLOOD BY AUTOMATED COUNT: 39.2 FL (ref 35.9–50)
GFR SERPLBLD CREATININE-BSD FMLA CKD-EPI: 114 ML/MIN/1.73 M 2
GLOBULIN SER CALC-MCNC: 2.2 G/DL (ref 1.9–3.5)
GLUCOSE BLD STRIP.AUTO-MCNC: 137 MG/DL (ref 65–99)
GLUCOSE BLD STRIP.AUTO-MCNC: 176 MG/DL (ref 65–99)
GLUCOSE BLD STRIP.AUTO-MCNC: 192 MG/DL (ref 65–99)
GLUCOSE SERPL-MCNC: 161 MG/DL (ref 65–99)
HCT VFR BLD AUTO: 27.3 % (ref 42–52)
HGB BLD-MCNC: 9 G/DL (ref 14–18)
MAGNESIUM SERPL-MCNC: 1.6 MG/DL (ref 1.5–2.5)
MCH RBC QN AUTO: 26.7 PG (ref 27–33)
MCHC RBC AUTO-ENTMCNC: 33 G/DL (ref 32.3–36.5)
MCV RBC AUTO: 81 FL (ref 81.4–97.8)
PLATELET # BLD AUTO: 174 K/UL (ref 164–446)
PMV BLD AUTO: 9.4 FL (ref 9–12.9)
POTASSIUM SERPL-SCNC: 3.6 MMOL/L (ref 3.6–5.5)
PROT SERPL-MCNC: 5.3 G/DL (ref 6–8.2)
RBC # BLD AUTO: 3.37 M/UL (ref 4.7–6.1)
SODIUM SERPL-SCNC: 139 MMOL/L (ref 135–145)
WBC # BLD AUTO: 5.8 K/UL (ref 4.8–10.8)

## 2024-12-11 PROCEDURE — A9270 NON-COVERED ITEM OR SERVICE: HCPCS

## 2024-12-11 PROCEDURE — 700102 HCHG RX REV CODE 250 W/ 637 OVERRIDE(OP): Performed by: INTERNAL MEDICINE

## 2024-12-11 PROCEDURE — 700101 HCHG RX REV CODE 250

## 2024-12-11 PROCEDURE — 83735 ASSAY OF MAGNESIUM: CPT

## 2024-12-11 PROCEDURE — 99232 SBSQ HOSP IP/OBS MODERATE 35: CPT | Performed by: STUDENT IN AN ORGANIZED HEALTH CARE EDUCATION/TRAINING PROGRAM

## 2024-12-11 PROCEDURE — 700111 HCHG RX REV CODE 636 W/ 250 OVERRIDE (IP)

## 2024-12-11 PROCEDURE — A9270 NON-COVERED ITEM OR SERVICE: HCPCS | Performed by: INTERNAL MEDICINE

## 2024-12-11 PROCEDURE — 700102 HCHG RX REV CODE 250 W/ 637 OVERRIDE(OP)

## 2024-12-11 PROCEDURE — 700111 HCHG RX REV CODE 636 W/ 250 OVERRIDE (IP): Mod: JZ | Performed by: INTERNAL MEDICINE

## 2024-12-11 PROCEDURE — 51798 US URINE CAPACITY MEASURE: CPT

## 2024-12-11 PROCEDURE — 770001 HCHG ROOM/CARE - MED/SURG/GYN PRIV*

## 2024-12-11 PROCEDURE — 82962 GLUCOSE BLOOD TEST: CPT | Mod: 91

## 2024-12-11 PROCEDURE — 80053 COMPREHEN METABOLIC PANEL: CPT

## 2024-12-11 PROCEDURE — 36415 COLL VENOUS BLD VENIPUNCTURE: CPT

## 2024-12-11 PROCEDURE — 85027 COMPLETE CBC AUTOMATED: CPT

## 2024-12-11 RX ORDER — POTASSIUM CHLORIDE 1500 MG/1
40 TABLET, EXTENDED RELEASE ORAL ONCE
Status: COMPLETED | OUTPATIENT
Start: 2024-12-11 | End: 2024-12-11

## 2024-12-11 RX ORDER — MAGNESIUM SULFATE HEPTAHYDRATE 40 MG/ML
2 INJECTION, SOLUTION INTRAVENOUS ONCE
Status: COMPLETED | OUTPATIENT
Start: 2024-12-11 | End: 2024-12-11

## 2024-12-11 RX ADMIN — ASPIRIN 81 MG: 81 TABLET, COATED ORAL at 05:20

## 2024-12-11 RX ADMIN — GABAPENTIN 100 MG: 100 CAPSULE ORAL at 13:13

## 2024-12-11 RX ADMIN — METOCLOPRAMIDE 5 MG: 10 TABLET ORAL at 13:13

## 2024-12-11 RX ADMIN — INSULIN LISPRO 3 UNITS: 100 INJECTION, SOLUTION INTRAVENOUS; SUBCUTANEOUS at 14:18

## 2024-12-11 RX ADMIN — INSULIN LISPRO 3 UNITS: 100 INJECTION, SOLUTION INTRAVENOUS; SUBCUTANEOUS at 18:34

## 2024-12-11 RX ADMIN — Medication 5 MG: at 20:29

## 2024-12-11 RX ADMIN — OMEPRAZOLE 40 MG: 20 CAPSULE, DELAYED RELEASE ORAL at 05:20

## 2024-12-11 RX ADMIN — SUCRALFATE ORAL 1 G: 1 SUSPENSION ORAL at 18:39

## 2024-12-11 RX ADMIN — VENLAFAXINE HYDROCHLORIDE 37.5 MG: 75 TABLET ORAL at 05:20

## 2024-12-11 RX ADMIN — SUCRALFATE ORAL 1 G: 1 SUSPENSION ORAL at 10:00

## 2024-12-11 RX ADMIN — SUCRALFATE ORAL 1 G: 1 SUSPENSION ORAL at 14:22

## 2024-12-11 RX ADMIN — ENOXAPARIN SODIUM 40 MG: 100 INJECTION SUBCUTANEOUS at 18:26

## 2024-12-11 RX ADMIN — TAMSULOSIN HYDROCHLORIDE 0.4 MG: 0.4 CAPSULE ORAL at 10:00

## 2024-12-11 RX ADMIN — MAGNESIUM SULFATE HEPTAHYDRATE 2 G: 2 INJECTION, SOLUTION INTRAVENOUS at 10:04

## 2024-12-11 RX ADMIN — LIDOCAINE HYDROCHLORIDE 30 ML: 20 SOLUTION OROPHARYNGEAL at 13:15

## 2024-12-11 RX ADMIN — POTASSIUM CHLORIDE 40 MEQ: 1500 TABLET, EXTENDED RELEASE ORAL at 09:59

## 2024-12-11 RX ADMIN — INSULIN LISPRO 3 UNITS: 100 INJECTION, SOLUTION INTRAVENOUS; SUBCUTANEOUS at 20:32

## 2024-12-11 RX ADMIN — LIDOCAINE 1 PATCH: 560 PATCH PERCUTANEOUS; TOPICAL; TRANSDERMAL at 13:14

## 2024-12-11 RX ADMIN — GABAPENTIN 100 MG: 100 CAPSULE ORAL at 05:20

## 2024-12-11 RX ADMIN — ACETAMINOPHEN 650 MG: 325 TABLET, FILM COATED ORAL at 13:13

## 2024-12-11 RX ADMIN — METOCLOPRAMIDE 5 MG: 10 TABLET ORAL at 18:28

## 2024-12-11 RX ADMIN — GABAPENTIN 100 MG: 100 CAPSULE ORAL at 20:28

## 2024-12-11 RX ADMIN — OMEPRAZOLE 40 MG: 20 CAPSULE, DELAYED RELEASE ORAL at 18:26

## 2024-12-11 RX ADMIN — METOCLOPRAMIDE 5 MG: 10 TABLET ORAL at 09:59

## 2024-12-11 RX ADMIN — SUCRALFATE ORAL 1 G: 1 SUSPENSION ORAL at 20:28

## 2024-12-11 ASSESSMENT — ENCOUNTER SYMPTOMS
ABDOMINAL PAIN: 1
WEIGHT LOSS: 1
DEPRESSION: 0
COUGH: 0
HEMOPTYSIS: 0
VOMITING: 0
MYALGIAS: 0
BLURRED VISION: 0
NAUSEA: 1
SHORTNESS OF BREATH: 0
HEADACHES: 0
FEVER: 0
CHILLS: 0
HEARTBURN: 1
DIZZINESS: 0
PALPITATIONS: 0
CONSTIPATION: 0
DIARRHEA: 0

## 2024-12-11 ASSESSMENT — PAIN DESCRIPTION - PAIN TYPE
TYPE: CHRONIC PAIN
TYPE: ACUTE PAIN

## 2024-12-11 NOTE — CARE PLAN
The patient is Stable - Low risk of patient condition declining or worsening    Shift Goals  Clinical Goals: pain management; safety; monitor urine output  Patient Goals: pain management; toileting; food  Family Goals: KATHERINE    Progress made toward(s) clinical / shift goals:    Problem: Psychosocial  Goal: Patient's ability to identify and develop effective coping behaviors will improve  Outcome: Progressing  Note: Patient cooperative and non agitated throughout the night. Patient still not understanding purpose of tele-sitter saying that it would get hacked and videos of him would be posted on the internet.      Problem: Pain - Standard  Goal: Alleviation of pain or a reduction in pain to the patient’s comfort goal  Outcome: Progressing  Note: With complaints of abdominal and R shoulder pain. PRN GI cocktail, acetaminophen and flexeril given.          Toothache    WHAT YOU NEED TO KNOW:    A toothache is pain that is caused by irritation of the nerves in the center of your tooth. The irritation may be caused by several problems, such as a cavity, an infection, a cracked tooth, or gum disease.   Tooth Anatomy         DISCHARGE INSTRUCTIONS:    Return to the emergency department if:   •You have trouble breathing or swallowing.       •You have swelling in your face or neck.       Contact your dentist if:   •You have a fever and chills.       •You have trouble opening or closing your mouth.       •You have swelling around your tooth.       •You have questions or concerns about your condition or care.      Medicines: You may need any of the following:   •NSAIDs, such as ibuprofen, help decrease swelling, pain, and fever. This medicine is available with or without a doctor's order. NSAIDs can cause stomach bleeding or kidney problems in certain people. If you take blood thinner medicine, always ask if NSAIDs are safe for you. Always read the medicine label and follow directions. Do not give these medicines to children younger than 6 months without direction from a healthcare provider.      •Acetaminophen decreases pain and fever. It is available without a doctor's order. Ask how much to take and how often to take it. Follow directions. Acetaminophen can cause liver damage if not taken correctly.      •Prescription pain medicine may be given. Ask your healthcare provider how to take this medicine safely. Some prescription pain medicines contain acetaminophen. Do not take other medicines that contain acetaminophen without talking to your healthcare provider. Too much acetaminophen may cause liver damage. Prescription pain medicine may cause constipation. Ask your healthcare provider how to prevent or treat constipation.       •Antibiotics help treat or prevent a bacterial infection.       •Take your medicine as directed. Contact your healthcare provider if you think your medicine is not helping or if you have side effects. Tell your provider if you are allergic to any medicine. Keep a list of the medicines, vitamins, and herbs you take. Include the amounts, and when and why you take them. Bring the list or the pill bottles to follow-up visits. Carry your medicine list with you in case of an emergency.      Self-care:   •Rinse your mouth with warm salt water 4 times a day or as directed.       •Eat soft foods to help relieve pain caused by chewing.       •Apply ice on your jaw or cheek for 15 to 20 minutes every hour or as directed. Use an ice pack, or put crushed ice in a plastic bag. Cover it with a towel before you apply it. Ice helps prevent tissue damage and decreases swelling and pain.      Help prevent a toothache:   •Brush your teeth at least 2 times a day.      •Use dental floss to clean between your teeth at least 1 time a day.      •See your dentist regularly every 6 months for dental cleanings and oral exams.      Follow up with your dentist as directed: You may be referred to a dental surgeon. Write down your questions so you remember to ask them during your visits.

## 2024-12-11 NOTE — PROGRESS NOTES
Patient refused 4 Eyes Skin Assessment with me and day shift RN. Education on importance of RN skin assessment given but patient continues to refuse. Instruction on maintaining skin integrity given. Patient verbalized understanding of instructions.

## 2024-12-11 NOTE — DISCHARGE PLANNING
Legal hold paperwork page two signed by MD, Medically cleared to transfer to a psych facility when available. Faxed to Alert team.

## 2024-12-11 NOTE — PROGRESS NOTES
2021: Patient voided 600 ml with PVR of 676ml. Notified Dr. Elder. Dr. Elder with bladder scan protocol orders. Per orders, patient needs a straight cath.     2325: Straight cath with coude catheter done after two failed attempts. Straight cath output of 750ml.    0235: Patient had 1 Large incontinent episode. PVR of 585ml.     0448: Patient voided again with output of 800 ml. PVR of 548ml. Notified Dr. Elder/Dr. Chavez that it took three attempts before we were able to successfully straight cath patient earlier. Per Dr. Gracia, okay to place jiménez catheter instead.    0600: Jiménez with coude catheter on 2nd attempt. Jiménez output of 1240ml.

## 2024-12-11 NOTE — DISCHARGE PLANNING
Received Stipulation and Order from the court continuing pt's legal hold until 12/19, scanned copy into pt's chart.

## 2024-12-12 LAB
ALBUMIN SERPL BCP-MCNC: 3.2 G/DL (ref 3.2–4.9)
ALBUMIN/GLOB SERPL: 1.2 G/DL
ALP SERPL-CCNC: 72 U/L (ref 30–99)
ALT SERPL-CCNC: 10 U/L (ref 2–50)
ANION GAP SERPL CALC-SCNC: 6 MMOL/L (ref 7–16)
AST SERPL-CCNC: 17 U/L (ref 12–45)
BILIRUB SERPL-MCNC: <0.2 MG/DL (ref 0.1–1.5)
BUN SERPL-MCNC: 20 MG/DL (ref 8–22)
CALCIUM ALBUM COR SERPL-MCNC: 8.7 MG/DL (ref 8.5–10.5)
CALCIUM SERPL-MCNC: 8.1 MG/DL (ref 8.5–10.5)
CHLORIDE SERPL-SCNC: 101 MMOL/L (ref 96–112)
CO2 SERPL-SCNC: 32 MMOL/L (ref 20–33)
CREAT SERPL-MCNC: 0.64 MG/DL (ref 0.5–1.4)
ERYTHROCYTE [DISTWIDTH] IN BLOOD BY AUTOMATED COUNT: 39.8 FL (ref 35.9–50)
GFR SERPLBLD CREATININE-BSD FMLA CKD-EPI: 108 ML/MIN/1.73 M 2
GLOBULIN SER CALC-MCNC: 2.6 G/DL (ref 1.9–3.5)
GLUCOSE BLD STRIP.AUTO-MCNC: 195 MG/DL (ref 65–99)
GLUCOSE BLD STRIP.AUTO-MCNC: 221 MG/DL (ref 65–99)
GLUCOSE BLD STRIP.AUTO-MCNC: 229 MG/DL (ref 65–99)
GLUCOSE BLD STRIP.AUTO-MCNC: 257 MG/DL (ref 65–99)
GLUCOSE BLD STRIP.AUTO-MCNC: 276 MG/DL (ref 65–99)
GLUCOSE SERPL-MCNC: 231 MG/DL (ref 65–99)
HCT VFR BLD AUTO: 29.7 % (ref 42–52)
HGB BLD-MCNC: 9.6 G/DL (ref 14–18)
MAGNESIUM SERPL-MCNC: 1.9 MG/DL (ref 1.5–2.5)
MCH RBC QN AUTO: 26.5 PG (ref 27–33)
MCHC RBC AUTO-ENTMCNC: 32.3 G/DL (ref 32.3–36.5)
MCV RBC AUTO: 82 FL (ref 81.4–97.8)
PLATELET # BLD AUTO: 201 K/UL (ref 164–446)
PMV BLD AUTO: 9.3 FL (ref 9–12.9)
POTASSIUM SERPL-SCNC: 4.2 MMOL/L (ref 3.6–5.5)
PROT SERPL-MCNC: 5.8 G/DL (ref 6–8.2)
RBC # BLD AUTO: 3.62 M/UL (ref 4.7–6.1)
SODIUM SERPL-SCNC: 139 MMOL/L (ref 135–145)
WBC # BLD AUTO: 6.3 K/UL (ref 4.8–10.8)

## 2024-12-12 PROCEDURE — 700102 HCHG RX REV CODE 250 W/ 637 OVERRIDE(OP)

## 2024-12-12 PROCEDURE — A9270 NON-COVERED ITEM OR SERVICE: HCPCS | Performed by: INTERNAL MEDICINE

## 2024-12-12 PROCEDURE — 700101 HCHG RX REV CODE 250

## 2024-12-12 PROCEDURE — A9270 NON-COVERED ITEM OR SERVICE: HCPCS

## 2024-12-12 PROCEDURE — 700111 HCHG RX REV CODE 636 W/ 250 OVERRIDE (IP): Mod: JZ | Performed by: INTERNAL MEDICINE

## 2024-12-12 PROCEDURE — 80053 COMPREHEN METABOLIC PANEL: CPT

## 2024-12-12 PROCEDURE — 36415 COLL VENOUS BLD VENIPUNCTURE: CPT

## 2024-12-12 PROCEDURE — 770001 HCHG ROOM/CARE - MED/SURG/GYN PRIV*

## 2024-12-12 PROCEDURE — 99232 SBSQ HOSP IP/OBS MODERATE 35: CPT | Performed by: STUDENT IN AN ORGANIZED HEALTH CARE EDUCATION/TRAINING PROGRAM

## 2024-12-12 PROCEDURE — 82962 GLUCOSE BLOOD TEST: CPT | Mod: 91

## 2024-12-12 PROCEDURE — 85027 COMPLETE CBC AUTOMATED: CPT

## 2024-12-12 PROCEDURE — 700102 HCHG RX REV CODE 250 W/ 637 OVERRIDE(OP): Performed by: INTERNAL MEDICINE

## 2024-12-12 PROCEDURE — 90837 PSYTX W PT 60 MINUTES: CPT | Performed by: SOCIAL WORKER

## 2024-12-12 PROCEDURE — 83735 ASSAY OF MAGNESIUM: CPT

## 2024-12-12 RX ORDER — LIDOCAINE 4 G/G
3 PATCH TOPICAL EVERY 24 HOURS
Status: DISCONTINUED | OUTPATIENT
Start: 2024-12-13 | End: 2024-12-12

## 2024-12-12 RX ORDER — LIDOCAINE 4 G/G
2 PATCH TOPICAL EVERY 24 HOURS
Status: DISCONTINUED | OUTPATIENT
Start: 2024-12-13 | End: 2024-12-12

## 2024-12-12 RX ORDER — LIDOCAINE 4 G/G
3 PATCH TOPICAL EVERY 24 HOURS
Status: DISCONTINUED | OUTPATIENT
Start: 2024-12-12 | End: 2024-12-13 | Stop reason: HOSPADM

## 2024-12-12 RX ADMIN — GABAPENTIN 100 MG: 100 CAPSULE ORAL at 05:18

## 2024-12-12 RX ADMIN — METOCLOPRAMIDE 5 MG: 10 TABLET ORAL at 13:15

## 2024-12-12 RX ADMIN — LIDOCAINE 1 PATCH: 560 PATCH PERCUTANEOUS; TOPICAL; TRANSDERMAL at 13:15

## 2024-12-12 RX ADMIN — SUCRALFATE ORAL 1 G: 1 SUSPENSION ORAL at 13:14

## 2024-12-12 RX ADMIN — INSULIN LISPRO 7 UNITS: 100 INJECTION, SOLUTION INTRAVENOUS; SUBCUTANEOUS at 19:21

## 2024-12-12 RX ADMIN — LIDOCAINE HYDROCHLORIDE 30 ML: 20 SOLUTION OROPHARYNGEAL at 21:19

## 2024-12-12 RX ADMIN — OMEPRAZOLE 40 MG: 20 CAPSULE, DELAYED RELEASE ORAL at 05:18

## 2024-12-12 RX ADMIN — SUCRALFATE ORAL 1 G: 1 SUSPENSION ORAL at 09:22

## 2024-12-12 RX ADMIN — TAMSULOSIN HYDROCHLORIDE 0.4 MG: 0.4 CAPSULE ORAL at 09:22

## 2024-12-12 RX ADMIN — Medication 5 MG: at 21:17

## 2024-12-12 RX ADMIN — GABAPENTIN 100 MG: 100 CAPSULE ORAL at 21:17

## 2024-12-12 RX ADMIN — INSULIN LISPRO 7 UNITS: 100 INJECTION, SOLUTION INTRAVENOUS; SUBCUTANEOUS at 21:18

## 2024-12-12 RX ADMIN — METOCLOPRAMIDE 5 MG: 10 TABLET ORAL at 09:22

## 2024-12-12 RX ADMIN — ACETAMINOPHEN 650 MG: 325 TABLET, FILM COATED ORAL at 09:24

## 2024-12-12 RX ADMIN — INSULIN LISPRO 4 UNITS: 100 INJECTION, SOLUTION INTRAVENOUS; SUBCUTANEOUS at 13:44

## 2024-12-12 RX ADMIN — GABAPENTIN 100 MG: 100 CAPSULE ORAL at 13:15

## 2024-12-12 RX ADMIN — SUCRALFATE ORAL 1 G: 1 SUSPENSION ORAL at 21:17

## 2024-12-12 RX ADMIN — LIDOCAINE HYDROCHLORIDE 30 ML: 20 SOLUTION OROPHARYNGEAL at 13:15

## 2024-12-12 RX ADMIN — ENOXAPARIN SODIUM 40 MG: 100 INJECTION SUBCUTANEOUS at 18:12

## 2024-12-12 RX ADMIN — VENLAFAXINE HYDROCHLORIDE 37.5 MG: 75 TABLET ORAL at 05:17

## 2024-12-12 RX ADMIN — ASPIRIN 81 MG: 81 TABLET, COATED ORAL at 05:18

## 2024-12-12 RX ADMIN — METOCLOPRAMIDE 5 MG: 10 TABLET ORAL at 18:12

## 2024-12-12 RX ADMIN — LIDOCAINE 3 PATCH: 4 PATCH TOPICAL at 23:57

## 2024-12-12 RX ADMIN — OMEPRAZOLE 40 MG: 20 CAPSULE, DELAYED RELEASE ORAL at 18:12

## 2024-12-12 RX ADMIN — SUCRALFATE ORAL 1 G: 1 SUSPENSION ORAL at 18:12

## 2024-12-12 RX ADMIN — INSULIN LISPRO 4 UNITS: 100 INJECTION, SOLUTION INTRAVENOUS; SUBCUTANEOUS at 10:29

## 2024-12-12 RX ADMIN — LIDOCAINE HYDROCHLORIDE 30 ML: 20 SOLUTION OROPHARYNGEAL at 05:18

## 2024-12-12 ASSESSMENT — ENCOUNTER SYMPTOMS
VOMITING: 0
DEPRESSION: 0
DIZZINESS: 0
SHORTNESS OF BREATH: 0
ABDOMINAL PAIN: 1
BLURRED VISION: 0
NAUSEA: 1
HEADACHES: 0
COUGH: 0
MYALGIAS: 0
CHILLS: 0
DIARRHEA: 0
HEMOPTYSIS: 0
FEVER: 0
WEIGHT LOSS: 1
HEARTBURN: 1
CONSTIPATION: 0
PALPITATIONS: 0

## 2024-12-12 ASSESSMENT — PAIN DESCRIPTION - PAIN TYPE
TYPE: ACUTE PAIN
TYPE: ACUTE PAIN

## 2024-12-12 NOTE — CONSULTS
"[facility]    INPATIENT ASSESSMENT    Name: Christoph Taylor  MRN: [id number]  : 1965  Age: 59 y.o.  Date of assessment: 2024  PCP: YESY Tariq.  Persons in attendance: Patient    HPI: Per Medical Record: \"Christoph Taylor is a 59 y.o. male with history of chronic upper abdominal pain uncontrolled diabetes with DKA's, CKD 3, kidney stones, horseshoe kidney, esophagitis, who presented 2024 with ongoing upper abdominal pain on and off associated with nausea and occasional vomiting.  He presented with the same complaints yesterday and was evaluated to be CT of the abdomen with contrast that showed possible esophagitis, distended urinary bladder and 5 mm nonobstructive kidney stone.  He reported suicidal ideations yesterday, but denies any ideations or plans today  During observation in ER he transiently desaturated to 83% and was placed on 2 L nasal cannula.  Blood work showed WBC 14.2.  COVID-19/influenza negative.  Chest x-ray: Negative.  CT of pulmonary artery was ordered  Blood sugar is in the 400s.  Patient stated he skipped 3 days of Lantus.  Bladder scan revealed 1 L of urine and I ordered Crowley catheter  Treatment in the ER included ceftriaxone and azithromycin for possible early pneumonia, 1 L of Ringer lactate and Tylenol 1 g.  Of note, patient denies cough or shortness of breath.\" Patient was referred tor Behavioral Health due to suicidal statements.       CHIEF COMPLAINT/PRESENTING ISSUE (as stated by Patient): Christoph Taylor is a 59 year old patient seen sleeping but able to be aroused. Patient was pleasant and more engaged than previously. Patient denies suicidal ideations, no plan or intent. Patient states, \"my legs are not working to good right now\". Patient reports being open to rehabilitation and was able to demonstrate some ability to be future focused. Patient states, \"when I go home, I'll be all alone again\". Patient is open to placement for " rehabilitation and then home with a privately employed home health aide. Patient believes he has to funs to support hiring private care. Patient no longer presents risk. He was not agitated or irritable. Patient reports a willingness to continue therapy while in the hospital. Patient presented with increased insight with the realization that he requires assistance physically and emotionally. Patient is now ready to accept the help.    Chief Complaint   Patient presents with    Abdominal Pain     X months.  Pt seen here yesterday for similar complaint.      Elevated Glucose     Pt arrives with a FSBG=  429.  He reports he has not taken his lantus x 4 days.  He reports he has not eaten much over last four days.      Suicidal Ideation     Pt reports feeling suicidal last night after being discharged home    CURRENT LIVING SITUATION/SOCIAL SUPPORT: Patient is retired and lives alone. He has three sons who reside in [place]. Patient reports no outside interests and no support system.     BEHAVIORAL HEALTH TREATMENT HISTORY  Does patient/parent report a history of prior behavioral health treatment for patient?   Previous hospital evaluations-no report of outpatient treatment     SAFETY ASSESSMENT - SELF  Does patient acknowledge current or past symptoms of dangerousness to self? yes  Does parent/significant other report patient has current or past symptoms of dangerousness to self? N/A  Does presenting problem suggest symptoms of dangerousness to self? No       SAFETY ASSESSMENT - OTHERS  Does patient acknowledge current or past symptoms of aggressive behavior or risk to others? no  Does parent/significant other report patient has current or past symptoms of aggressive behavior or risk to others?  N\A  Does presenting problem suggest symptoms of dangerousness to others? No        Crisis Safety Plan completed and copy given to patient? Attempted     ABUSE/NEGLECT SCREENING     Does patient report feeling “unsafe” in  "his/her home, or afraid of anyone?  no  Does patient report any history of physical, sexual, or emotional abuse?  no  Does parent or significant other report any of the above? N\A  Is there evidence of neglect by self?  no  Is there evidence of neglect by a caregiver? no  Does the patient/parent report any history of CPS/APS/police involvement related to suspected abuse/neglect or domestic violence? no  Based on the information provided during the current assessment, is a mandated report of suspected abuse/neglect being made?  No     SUBSTANCE USE SCREENING  Yes:  Merlin all substances used in the past 30 days:         Last Use Amount   []   Alcohol       []   Marijuana       []   Heroin       [x]   Prescription Opioids  (used without prescription, for    recreation, or in excess of prescribed amount)       []   Other Prescription  (used without prescription, for    recreation, or in excess of prescribed amount)       []   Cocaine       []   Methamphetamine       []   \"\" drugs (ectasy, MDMA)       []   Other substances                     UDS results: opiates, oxycodone  Diagnostic alcohol results: <10.1     What consequences does the patient associate with any of the above substance use and or addictive behaviors? Health problems:      Risk factors for detox (check all that apply):  []   Seizures   []   Diaphoretic (sweating)   []   Tremors   []   Hallucinations   []   Increased blood pressure   []   Decreased blood pressure   []   Other   []   None       [] Patient education on risk factors for detoxification and instructed to return to ER as needed.     MENTAL STATUS              Participation: adequate  Grooming: Casual  Orientation: Alert  Behavior: Tense  Eye contact: Limited  Mood: Depressed  Affect: Flat  Thought process: Circumstantial  Thought content: Within normal limits  Speech: Soft  Perception: Within normal limits  Memory:  Recent:  Adequate  Insight: Adequate  Judgment:  Adequate  Other:   "   Collateral information:   Source:   Significant other present in person:    Significant other by telephone  [facility] Nursing Staff-[facility] Medical Record-reviewed   Other: Attending and Resident team      Unable to complete full assessment due to:   Acute intoxication   Patient declined to participate/engage   Patient verbally unresponsive   Significant cognitive deficits   Significant perceptual distortions or behavioral disorganization   Other:              CLINICAL IMPRESSIONS:  Primary:  Major Depressive Disorder  Secondary:  Depression due to other medical condition                                        IDENTIFIED NEEDS/PLAN:  [Trigger DISPOSITION list for any items marked]       Imminent safety risk - self   Imminent safety risk - others     Acute substance withdrawal   Psychosis/Impaired reality testing    x Mood/anxiety   Substance use/Addictive behavior     Maladaptive behavior   Parent/child conflict     Family/Couples conflict   Biomedical     Housing   Financial      Legal   Occupational/Educational     Domestic violence   Other:      Recommendations and Observation Level:     Please consult for palliative for advance directives, end of life care and pain management     Legal Hold: geoff Tripathi, Ph.D., [facility]  12/12/2024   Length of intervention: 60 minutes

## 2024-12-12 NOTE — DISCHARGE PLANNING
Alert Team Note:    Received call from April at [facility] stating pt has been declined no accepting doctor due to medical complexity.  Pt has jiménez catheter and hiatal hernia.

## 2024-12-12 NOTE — PROGRESS NOTES
Diabetes education: Pt remains on legal hold and unclear if appropriate for diabetes discussion. Pt has been seen in previous admits. Per CM note pt may be transferred to psych facility. CDE to follow up on Monday if he remains in the hospital.

## 2024-12-12 NOTE — PROGRESS NOTES
[facility] Internal Medicine Daily Progress Note    Date of Service  12/11/2024    R Team: R IM Blue Team   Attending: Teresa Tidwell M.d.  Senior Resident: Jarvis Girard  Intern:  Johnna Argueta  Contact Number: [phone]    Chief Complaint  Christoph Taylor is a 59 y.o. male admitted 12/6/2024 with intractable abdominal pain.    Hospital Course  Christoph Taylor is a 59 year old male patient with PMHx of T2DM with neuropathy (HbA1C: 13), severe LA class D oesophagitis, CKD, HLD, Chronic lower back and neck pain with motor neuropathy of upper B/L limbs, and previous hospitalization for DKA, recurrent oesophagitis presented to the hospital on 12/6/2024 via [facility] for intractable abdominal pain, hyperglycemia and suicidal ideation. He was recently seen in ED on 12/5 for similar abdominal pain, and sent home when no acute abnormalities were detected.     On this admission his abdominal pain is located to the same spot, left upper quadrant under his ribs. Symptomatologies are in keeping with likely peptic ulcer disease as the pain is trigger by oral intake, and relieved with anti-acids. His last EGD was done in 3/2023 which showed severe oesophagitis.    His poorly controlled diabetes is a result of noncompliance which is due to poor understanding and fear of insulin therapy (which had previously resulted in reported cardiac arrest). Diabetic education has been consulted.    During hospitalization patient expressed suicidal thoughts, in the context of severe depression and hopelessness given all his medical conditions he is trying to juggle. Legal hold was placed and recommendation by psychiatry to start Effexor for mood and transfer to inpatient psychiatric hospital when medically cleared.    12/9:  He underwent an EGD on 12/9, which showed esophageal findings consistent with LA class D erosive esophagitis, Hiatal hernia and prominent duodenal ampullae.  -Patient started on Metoclopramide 5 mg PO TID for  gastroparesis  -His jiménez catheter was removed yesterday and he passed his voiding trial.    12/10  -Patient was seen by Psychiatry today and as per their assessment he will continue to be on legal hold.  -Palliative Consult was put for pain management and advance care planning but as per palliative, considering he is on legal hold, He is not legally bill to participate in goals of care/advance care planning discussion.  -His blood sugar levels have been low overnight with a range of  mg therefore, His Insulin Glargine has been reduced to 17 U from 20 U.  -Patient failed his voiding trial and a jiménez catheter was placed again        Interval Problem Update  Overnight events:  Patients foleys catheter was placed again as he failed voiding trial overnight.    12/11:  This morning, He continues to reports mild pain (4/10) in his abdomen post meals which improves with GI cocktail. His last bowel movement was yesterday. No more diarrhea      I have discussed this patient's plan of care and discharge plan at IDT rounds today with Case Management, Nursing, Nursing leadership, and other members of the IDT team.    Consultants/Specialty  psychiatry    Code Status  Full Code    Disposition  Medically Cleared  I have placed the appropriate orders for post-discharge needs.    Review of Systems  Review of Systems   Constitutional:  Positive for malaise/fatigue and weight loss. Negative for chills and fever.   HENT:  Negative for hearing loss.    Eyes:  Negative for blurred vision.   Respiratory:  Negative for cough, hemoptysis and shortness of breath.    Cardiovascular:  Negative for chest pain and palpitations.   Gastrointestinal:  Positive for abdominal pain, heartburn and nausea. Negative for constipation, diarrhea and vomiting.   Genitourinary:  Negative for dysuria, hematuria and urgency.   Musculoskeletal:  Negative for myalgias.   Neurological:  Negative for dizziness and headaches.   Psychiatric/Behavioral:   Negative for depression.         Physical Exam  Temp:  [36.9 °C (98.4 °F)-37.4 °C (99.3 °F)] 37 °C (98.6 °F)  Pulse:  [78-97] 82  Resp:  [17-18] 18  BP: (121-161)/(70-98) 121/72  SpO2:  [91 %-97 %] 93 %    Physical Exam  Constitutional:       General: He is not in acute distress.  HENT:      Mouth/Throat:      Mouth: Mucous membranes are moist.   Eyes:      Pupils: Pupils are equal, round, and reactive to light.   Cardiovascular:      Rate and Rhythm: Normal rate and regular rhythm.      Pulses: Normal pulses.      Heart sounds: No murmur heard.  Pulmonary:      Effort: Pulmonary effort is normal. No respiratory distress.      Breath sounds: No wheezing.   Abdominal:      General: Abdomen is flat.      Palpations: Abdomen is soft.      Tenderness: Tenderness: Left upper quadrant.   Musculoskeletal:         General: Deformity (Left foot) present. Normal range of motion.   Skin:     Capillary Refill: Capillary refill takes less than 2 seconds.   Neurological:      General: No focal deficit present.      Mental Status: He is alert and oriented to person, place, and time.      Motor: No weakness.   Psychiatric:      Comments: depressed         Fluids    Intake/Output Summary (Last 24 hours) at 12/11/2024 1624  Last data filed at 12/11/2024 1430  Gross per 24 hour   Intake 3140 ml   Output 5315 ml   Net -2175 ml       Laboratory  Recent Labs     12/09/24  0338 12/10/24  0138 12/11/24  0025   WBC 4.6* 4.0* 5.8   RBC 3.78* 3.27* 3.37*   HEMOGLOBIN 9.8* 8.7* 9.0*   HEMATOCRIT 30.6* 26.8* 27.3*   MCV 81.0* 82.0 81.0*   MCH 25.9* 26.6* 26.7*   MCHC 32.0* 32.5 33.0   RDW 39.2 40.0 39.2   PLATELETCT 182 161* 174   MPV 9.4 9.6 9.4     Recent Labs     12/10/24  0138 12/11/24  0025   SODIUM 141 139   POTASSIUM 3.7 3.6   CHLORIDE 105 102   CO2 31 32   GLUCOSE 166* 161*   BUN 13 14   CREATININE 0.55 0.53   CALCIUM 7.9* 8.2*                   Imaging  CT-CTA CHEST PULMONARY ARTERY W/ RECONS   Final Result      1.  No CT evidence of  pulmonary emboli.      2.  Mild wedge type compression fracture of T12 vertebral body of indeterminate age likely chronic.            DX-CHEST-LIMITED (1 VIEW)   Final Result         No acute cardiopulmonary abnormalities are identified.           Assessment/Plan  Problem Representation:    * Acute respiratory failure (HCC)- (present on admission)  Assessment & Plan  Patient presented with acute hypoxic respiratory failure of unknown etiology which is currently resolving.   Chest x-ray done on admission was  negative for pneumonia,CTA of pulmonary artery negative for PE.  Consulting clinic on 7/2024 was normal.  Stress test in October 2024 was normal. He was given ceftriaxone empirically in the ER for pneumonia, however he his procalcitonin was negative. He's currently on 1 L of oxygen while sleeping.  - possibly due to undiagnosed sleep apnea, okay for oxygen while sleeping.  - wean off oxygen while awake  - Outpatient sleep study  - Home O2 eval    Intractable left upper quadrant abdominal pain- (present on admission)  Assessment & Plan  Patient complains of intractable left upper quadrant abdominal pain which usually occurs with oral intake. relieved with GI cocktail. He has a hx of severe LA class D esophagitis. Last EGD was done on 3/14/2023. He reports significant reduction in his appetite and weight as well.  EGD was done today on 12/9/2024 which showed;  30-38 cm circumferential white mucosa consistent with LA class D erosive oesophagitis.  38-42 cm hiatal hernia, no pyloric stenosis  Prominent Ampullae in the Duodenum  Per GI recs;  -Continue Omeprazole BID for 8 weeks and then daily indefinitely  -Continue  Sucralfate QID 2 weeks and then stop  -Start on metoclopramide 5mg PO TID   -Continue GI cocktail PRN  -Start on Gastroparesis diet  -Follow up with GI for EGD in 8+ weeks for healing of esophagitis and no presence of Pereira's esophagus.        Gastroparesis  Assessment & Plan  -Continue Metoclopromide  5mg PO TID     Sleep apnea  Assessment & Plan  Likely, due to desaturations while sleeping  - will need sleep consultation outpatient.   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    Major depressive disorder  Assessment & Plan  Depressive mood In context of several medical conditions and disabilities impacting quality of life  - psych following  - Continue legal hold  per psych  - Start Effexor (venlafaxine) per psych  - Inpatient psych transfer once medically cleared    Suicidal ideations  Assessment & Plan  Patient was placed on legal hold with sitter  - psych consult, appreciate recs  Passive thoughts, No plan  - see major depressive disorder plan    Chronic midline low back pain  Assessment & Plan  CTA chest revealed T12 fracture - likely chronic in nature  - Continue pain management  - Continue lidocaine  - avoid dilaudid  - oxycodone for breakthrough, however will likely discontinue once for sure etiology is gastric    LLQ abdominal pain  Assessment & Plan  It is chronic.  CT of the abdomen from 12/5 did not reveal    Urinary retention- (present on admission)  Assessment & Plan  Patient is retaining urine and a foleys catheter was placed. He was given a voiding trial but he failed it and foleys was placed again.  -Indwelling urinary catheter protocol  -Continue Tamsulosin 0.4 mg once a day  -Urology consult outpatient    Uncontrolled type 2 diabetes mellitus with hyperglycemia (HCC)- (present on admission)  Assessment & Plan  He presented with blood sugar in the 400s due to noncompliance with long-acting insulin.His blood sugar levels have been low overnight with a range of  mg therefore, His Insulin Glargine has been reduced to 17 U from 20  U.  -Continue Insulin Glargine 17 units every evening  -Continue Insulin sliding scale  -Insulin Compliance counseling  -Diabetic educator    CKD (chronic kidney disease)- (present on admission)  Assessment & Plan  Patient had a creatinine 1.12, BUN 31 on presentation and was somewhat depleted intravascularly. He received 1L of IV fluids in the ED. RFTs on repeat shows improvement with Cr: 0.84 and BUN: 21.    -Avoid nephrotoxins  -Continue to monitor    Leukocytosis- (present on admission)  Assessment & Plan  Patient had a WBC 14.2 on admission, likely reactive, 2/2 hyperglycemia or esophagitis as CT-Scan of the abdomen from yesterday showed possible esophagitis.  empirically received ceftriaxone in ER. Chest x-ray negative for pneumonia, CTPE of the chest was negative. UA negative for LE, nitrites, WBC and bacteria. WBC as of 12/8 is at 6.0.  -No antibiotics for now         VTE prophylaxis: enoxaparin ppx    I have performed a physical exam and reviewed and updated ROS and Plan today (12/11/2024). In review of yesterday's note (12/10/2024), there are no changes except as documented above.

## 2024-12-12 NOTE — DISCHARGE PLANNING
Alert Team Note     Informed by Alert Team JESSIE Cazares that pt is medically clear. Will send referrals once legal hold is received with section 2 completed and signed.

## 2024-12-12 NOTE — CARE PLAN
The patient is Stable - Low risk of patient condition declining or worsening    Shift Goals  Clinical Goals: pain management, monitor vitals, Is/Os, safety, blood sugar checks  Patient Goals: sleep, pain management  Family Goals: vicente    Progress made toward(s) clinical / shift goals:        Problem: Knowledge Deficit - Standard  Goal: Patient and family/care givers will demonstrate understanding of plan of care, disease process/condition, diagnostic tests and medications  Description: Target End Date:  1-3 days or as soon as patient condition allows    Document in Patient Education    1.  Patient and family/caregiver oriented to unit, equipment, visitation policy and means for communicating concern  2.  Complete/review Learning Assessment  3.  Assess knowledge level of disease process/condition, treatment plan, diagnostic tests and medications  4.  Explain disease process/condition, treatment plan, diagnostic tests and medications  Outcome: Progressing  Note: Patient has been educated on plan of care, medications, and safety. No further questions from patient      Problem: Provide Safe Environment  Goal: Suicide environmental safety, protocols, policies, and practices will be implemented  Description: Target End Date:  resolve day 1    1.  Remove objects or personal belongings that may cause harm or injury to self or others  2.  Dietary tray modifications (paperware)  3.  Provide a safe environment  4.  Render close patient supervision by sustaining observation or awareness of the patient at all times  Outcome: Progressing  Note: Patient is still on a legal hold. Continuous tele sitter placed, unnecessary medical equipment out of room, safety meal trays, and room close to the nurses station to provide a safe environment.      Problem: Pain - Standard  Goal: Alleviation of pain or a reduction in pain to the patient’s comfort goal  Description: Target End Date:  Prior to discharge or change in level of  care    Document on Vitals flowsheet    1.  Document pain using the appropriate pain scale per order or unit policy  2.  Educate and implement non-pharmacologic comfort measures (i.e. relaxation, distraction, massage, cold/heat therapy, etc.)  3.  Pain management medications as ordered  4.  Reassess pain after pain med administration per policy  5.  If opiods administered assess patient's response to pain medication is appropriate per POSS sedation scale  6.  Follow pain management plan developed in collaboration with patient and interdisciplinary team (including palliative care or pain specialists if applicable)  Outcome: Progressing  Note: Patient has been educated on pain meds and and pain scale. Patient was having some abdominal pain, gi cocktail, tylenol, and lidocaine patches were utilized to manage pain. Narcotics are being avoided       Patient is not progressing towards the following goals:

## 2024-12-12 NOTE — ASSESSMENT & PLAN NOTE
Patient is retaining urine and a foleys catheter was placed. He was given a voiding trial but he failed it and foleys was placed again.  -Indwelling urinary catheter protocol  -Continue Tamsulosin 0.4 mg once a day  -Urology consult outpatient

## 2024-12-12 NOTE — DISCHARGE PLANNING
[facility] ALERT TEAM DISCHARGE PLANNING NOTE    Date:  12/12/2024  Patient Name:  Christoph Taylor - 59 y.o. - Discharge Planning  MRN:  [id number]   YOB: 1965  ADMISSION DATE:  12/6/2024     Writer forwarded referral packet for inpatient psychiatric care to the following community providers:  [facility], [facility], [facility], and  [facility]    Items included in the referral packet:   __x___Face Sheet   __x___Pages 1 and 2 of completed legal hold   __x___Alert Team/Psych Assessment   __x___H&P   __x___UDS   _____Blood Alcohol   ___x__Vital signs   _____Pregnancy Test (if applicable)   __x___Medications List   _____Covid Screen

## 2024-12-12 NOTE — DIETARY
"Nutrition Services: Initial Assessment     Day 6 of admit. Christoph Taylor is 59 y.o., male with admitting DX of Respiratory failure (HCC) [J96.90].    Consult Received for: Malnutrition admit screen score 3: unplanned wt loss 14-23 lb x 3 months, poor PO intake PTA.  Consult for DM diet education    Current Hospital Problems List:      Acute respiratory failure (HCC) (POA: Yes)    Leukocytosis (POA: Yes)    CKD (chronic kidney disease) (POA: Yes)    Uncontrolled type 2 diabetes mellitus with hyperglycemia (HCC) (POA: Yes)    Urinary retention (POA: Yes)    Intractable left upper quadrant abdominal pain (POA: Yes)    Chronic midline low back pain (POA: Unknown)    Suicidal ideations (POA: Unknown)    Major depressive disorder (POA: Unknown)    Other lesions of median nerve, bilateral upper limbs (POA: Unknown)    Lesion of ulnar nerve, bilateral upper limbs (POA: Unknown)      Overview: Likely chronic      Ulnar clawing and thenar muscle wasting bilaterally    Sleep apnea (POA: Unknown)    Gastroparesis (POA: Unknown)    Nutrition Assessment:      Height: 182.9 cm (6' 0.01\")  Weight: 77.6 kg (171 lb 1.2 oz)  Weight taken via: Bed Scale  BMI Calculated: 23.17  BMI Classification: WNL     Wt Readings from Last 5 Encounters:   12/07/24 77.6 kg (171 lb 1.2 oz)   12/05/24 72.6 kg (160 lb)   11/19/24 72.6 kg (160 lb)   10/30/24 79.4 kg (175 lb)   10/29/24 78.9 kg (174 lb)   Possible 8.6% wt loss in 1.5 months per chart hx; however, wt is back up. Need to verify wt hx w/ pt.    Objective:   Pertinent Medical Hx: CKD 3, DKA, horseshoe kidney, esophagitis, T2DM, [place]  Pertinent Labs: Ca 8.1, corrected Ca 8.7, POC glucose/24 hrs = 176-231, A1c (10/15/24) = 13.0%  Pertinent Meds: neurontin, lantus, SSI, reglan, prilosec, carafate  Skin/Wounds:  L thumb burn blister, partial thickness R MTH 5  Food Allergies: NFKA  Last BM:  Type 7: Watery, no solid pieces-entirely liquid  12/11/24     Current Diet Order/Intake:   Consistent " CHO diet, GI soft; poor PO intake through 12/9; since 12/10, intake has been % x 3 meals documented in ADLs    Subjective:   Visit attempted at bedside; pt not available.    Nutrition Focused Physical Exam (NFPE)  Weight Loss: ***. RD Suspects this change related to ***.  Muscle Mass: {Options:4974854}  Subcutaneous Fat: {Options:2315691}  Fluid Accumulation: no documented edema  Reduced  Strength: N/A in acute care setting.    Nutrition Diagnosis:      {PESProblem:2819403} related to *** as evidenced by ***.    {ModSevereMal:7572505} in context of {MalContext:2744820} related to *** as evidenced by ***.      RD notified provider {Select:8814482}.     *** based on RD assessment at this time, {...:9972426}    Nutrition Interventions:      Initiate  at 25 ml/hr continuous and advance per protocol to goal rate of *** ml/hr.  Goal tube feed volume provides *** kcals, *** g protein, and *** ml free water daily.   Recommend {Supplement Options:71056615} {Frequency:4896094}.  Patient aware of active plan of care as appropriate.       Nutrition Monitoring and Evaluation:      Monitor nutrition POC, goal for ***% intake from meals and supplements.  Additional fluids per MD/DO  Monitor vital signs pertinent to nutrition.    RD following and will provide updated recommendations as indicated.      Jaymie MONTEZ MondayAMI/AND CRITERIA FOR MALNUTRITION

## 2024-12-12 NOTE — DISCHARGE PLANNING
Notice of Medical Clerance filed to the court via [employer] notifying the court that pt is now medically clear, scanned copy into pt's chart.

## 2024-12-13 VITALS
WEIGHT: 171.08 LBS | RESPIRATION RATE: 18 BRPM | TEMPERATURE: 97 F | DIASTOLIC BLOOD PRESSURE: 89 MMHG | HEART RATE: 93 BPM | SYSTOLIC BLOOD PRESSURE: 157 MMHG | BODY MASS INDEX: 23.17 KG/M2 | OXYGEN SATURATION: 90 % | HEIGHT: 72 IN

## 2024-12-13 LAB
ALBUMIN SERPL BCP-MCNC: 2.8 G/DL (ref 3.2–4.9)
ALBUMIN/GLOB SERPL: 1.1 G/DL
ALP SERPL-CCNC: 66 U/L (ref 30–99)
ALT SERPL-CCNC: 7 U/L (ref 2–50)
ANION GAP SERPL CALC-SCNC: 8 MMOL/L (ref 7–16)
AST SERPL-CCNC: 12 U/L (ref 12–45)
BILIRUB SERPL-MCNC: <0.2 MG/DL (ref 0.1–1.5)
BUN SERPL-MCNC: 24 MG/DL (ref 8–22)
CALCIUM ALBUM COR SERPL-MCNC: 9.3 MG/DL (ref 8.5–10.5)
CALCIUM SERPL-MCNC: 8.3 MG/DL (ref 8.5–10.5)
CHLORIDE SERPL-SCNC: 101 MMOL/L (ref 96–112)
CO2 SERPL-SCNC: 30 MMOL/L (ref 20–33)
CREAT SERPL-MCNC: 0.71 MG/DL (ref 0.5–1.4)
ERYTHROCYTE [DISTWIDTH] IN BLOOD BY AUTOMATED COUNT: 40.3 FL (ref 35.9–50)
GFR SERPLBLD CREATININE-BSD FMLA CKD-EPI: 105 ML/MIN/1.73 M 2
GLOBULIN SER CALC-MCNC: 2.5 G/DL (ref 1.9–3.5)
GLUCOSE BLD STRIP.AUTO-MCNC: 132 MG/DL (ref 65–99)
GLUCOSE BLD STRIP.AUTO-MCNC: 163 MG/DL (ref 65–99)
GLUCOSE SERPL-MCNC: 162 MG/DL (ref 65–99)
HCT VFR BLD AUTO: 27.1 % (ref 42–52)
HGB BLD-MCNC: 8.6 G/DL (ref 14–18)
MCH RBC QN AUTO: 26.1 PG (ref 27–33)
MCHC RBC AUTO-ENTMCNC: 31.7 G/DL (ref 32.3–36.5)
MCV RBC AUTO: 82.1 FL (ref 81.4–97.8)
PLATELET # BLD AUTO: 216 K/UL (ref 164–446)
PMV BLD AUTO: 9.1 FL (ref 9–12.9)
POTASSIUM SERPL-SCNC: 4.1 MMOL/L (ref 3.6–5.5)
PROT SERPL-MCNC: 5.3 G/DL (ref 6–8.2)
RBC # BLD AUTO: 3.3 M/UL (ref 4.7–6.1)
SODIUM SERPL-SCNC: 139 MMOL/L (ref 135–145)
WBC # BLD AUTO: 5.7 K/UL (ref 4.8–10.8)

## 2024-12-13 PROCEDURE — 700102 HCHG RX REV CODE 250 W/ 637 OVERRIDE(OP): Performed by: INTERNAL MEDICINE

## 2024-12-13 PROCEDURE — 700102 HCHG RX REV CODE 250 W/ 637 OVERRIDE(OP)

## 2024-12-13 PROCEDURE — 82962 GLUCOSE BLOOD TEST: CPT

## 2024-12-13 PROCEDURE — A9270 NON-COVERED ITEM OR SERVICE: HCPCS

## 2024-12-13 PROCEDURE — 97535 SELF CARE MNGMENT TRAINING: CPT

## 2024-12-13 PROCEDURE — A9270 NON-COVERED ITEM OR SERVICE: HCPCS | Performed by: INTERNAL MEDICINE

## 2024-12-13 PROCEDURE — 80053 COMPREHEN METABOLIC PANEL: CPT

## 2024-12-13 PROCEDURE — 85027 COMPLETE CBC AUTOMATED: CPT

## 2024-12-13 PROCEDURE — 99239 HOSP IP/OBS DSCHRG MGMT >30: CPT | Performed by: STUDENT IN AN ORGANIZED HEALTH CARE EDUCATION/TRAINING PROGRAM

## 2024-12-13 PROCEDURE — 97530 THERAPEUTIC ACTIVITIES: CPT

## 2024-12-13 PROCEDURE — 36415 COLL VENOUS BLD VENIPUNCTURE: CPT

## 2024-12-13 RX ORDER — ONDANSETRON 4 MG/1
4 TABLET, ORALLY DISINTEGRATING ORAL EVERY 4 HOURS PRN
Status: SHIPPED
Start: 2024-12-13

## 2024-12-13 RX ORDER — LIDOCAINE 4 G/G
3 PATCH TOPICAL EVERY 24 HOURS
Status: SHIPPED
Start: 2024-12-14

## 2024-12-13 RX ORDER — INSULIN ASPART 100 [IU]/ML
3-14 INJECTION, SOLUTION INTRAVENOUS; SUBCUTANEOUS
Status: ACTIVE | DISCHARGE
Start: 2024-12-13 | End: 2025-02-11

## 2024-12-13 RX ORDER — SUCRALFATE ORAL 1 G/10ML
1 SUSPENSION ORAL
Status: SHIPPED
Start: 2024-12-13

## 2024-12-13 RX ORDER — TAMSULOSIN HYDROCHLORIDE 0.4 MG/1
0.4 CAPSULE ORAL
Status: SHIPPED
Start: 2024-12-14

## 2024-12-13 RX ORDER — ENOXAPARIN SODIUM 100 MG/ML
40 INJECTION SUBCUTANEOUS DAILY
Status: ACTIVE | DISCHARGE
Start: 2024-12-13

## 2024-12-13 RX ORDER — ONDANSETRON 2 MG/ML
4 INJECTION INTRAMUSCULAR; INTRAVENOUS EVERY 4 HOURS PRN
Status: SHIPPED
Start: 2024-12-13

## 2024-12-13 RX ORDER — INSULIN GLARGINE 100 [IU]/ML
20 INJECTION, SOLUTION SUBCUTANEOUS EVERY EVENING
Status: ACTIVE | DISCHARGE
Start: 2024-12-13

## 2024-12-13 RX ORDER — OMEPRAZOLE 40 MG/1
40 CAPSULE, DELAYED RELEASE ORAL 2 TIMES DAILY
Status: SHIPPED
Start: 2024-12-13

## 2024-12-13 RX ORDER — POLYETHYLENE GLYCOL 3350 17 G/17G
17 POWDER, FOR SOLUTION ORAL
Status: SHIPPED
Start: 2024-12-13

## 2024-12-13 RX ORDER — METOCLOPRAMIDE 5 MG/1
5 TABLET ORAL
Status: SHIPPED
Start: 2024-12-13

## 2024-12-13 RX ORDER — GABAPENTIN 100 MG/1
100 CAPSULE ORAL 3 TIMES DAILY
Status: SHIPPED
Start: 2024-12-13

## 2024-12-13 RX ORDER — VENLAFAXINE 37.5 MG/1
37.5 TABLET ORAL DAILY
Status: SHIPPED
Start: 2024-12-14

## 2024-12-13 RX ORDER — ACETAMINOPHEN 325 MG/1
650 TABLET ORAL EVERY 6 HOURS PRN
Status: SHIPPED
Start: 2024-12-13

## 2024-12-13 RX ORDER — BLOOD SUGAR DIAGNOSTIC
1 STRIP MISCELLANEOUS PRN
Status: SHIPPED
Start: 2024-12-13

## 2024-12-13 RX ORDER — AMOXICILLIN 250 MG
2 CAPSULE ORAL EVERY EVENING
Status: SHIPPED
Start: 2024-12-13

## 2024-12-13 RX ORDER — GUAIFENESIN/DEXTROMETHORPHAN 100-10MG/5
10 SYRUP ORAL EVERY 6 HOURS PRN
Status: SHIPPED
Start: 2024-12-13

## 2024-12-13 RX ORDER — INSULIN LISPRO 100 [IU]/ML
4 INJECTION, SOLUTION INTRAVENOUS; SUBCUTANEOUS
Status: DISCONTINUED | OUTPATIENT
Start: 2024-12-13 | End: 2024-12-13 | Stop reason: HOSPADM

## 2024-12-13 RX ADMIN — GABAPENTIN 100 MG: 100 CAPSULE ORAL at 13:43

## 2024-12-13 RX ADMIN — INSULIN LISPRO 3 UNITS: 100 INJECTION, SOLUTION INTRAVENOUS; SUBCUTANEOUS at 08:29

## 2024-12-13 RX ADMIN — SUCRALFATE ORAL 1 G: 1 SUSPENSION ORAL at 13:43

## 2024-12-13 RX ADMIN — OMEPRAZOLE 40 MG: 20 CAPSULE, DELAYED RELEASE ORAL at 04:24

## 2024-12-13 RX ADMIN — CYCLOBENZAPRINE 5 MG: 10 TABLET, FILM COATED ORAL at 04:24

## 2024-12-13 RX ADMIN — INSULIN LISPRO 4 UNITS: 100 INJECTION, SOLUTION INTRAVENOUS; SUBCUTANEOUS at 13:45

## 2024-12-13 RX ADMIN — VENLAFAXINE HYDROCHLORIDE 37.5 MG: 75 TABLET ORAL at 04:23

## 2024-12-13 RX ADMIN — SUCRALFATE ORAL 1 G: 1 SUSPENSION ORAL at 08:17

## 2024-12-13 RX ADMIN — TAMSULOSIN HYDROCHLORIDE 0.4 MG: 0.4 CAPSULE ORAL at 08:17

## 2024-12-13 RX ADMIN — METOCLOPRAMIDE 5 MG: 10 TABLET ORAL at 13:43

## 2024-12-13 RX ADMIN — METOCLOPRAMIDE 5 MG: 10 TABLET ORAL at 08:18

## 2024-12-13 RX ADMIN — GABAPENTIN 100 MG: 100 CAPSULE ORAL at 04:24

## 2024-12-13 RX ADMIN — ASPIRIN 81 MG: 81 TABLET, COATED ORAL at 04:24

## 2024-12-13 RX ADMIN — INSULIN LISPRO 4 UNITS: 100 INJECTION, SOLUTION INTRAVENOUS; SUBCUTANEOUS at 08:25

## 2024-12-13 ASSESSMENT — GAIT ASSESSMENTS
ASSISTIVE DEVICE: FRONT WHEEL WALKER
GAIT LEVEL OF ASSIST: CONTACT GUARD ASSIST
DISTANCE (FEET): 20
DEVIATION: BRADYKINETIC;SHUFFLED GAIT

## 2024-12-13 ASSESSMENT — COGNITIVE AND FUNCTIONAL STATUS - GENERAL
MOBILITY SCORE: 18
WALKING IN HOSPITAL ROOM: A LITTLE
TURNING FROM BACK TO SIDE WHILE IN FLAT BAD: A LITTLE
DRESSING REGULAR LOWER BODY CLOTHING: A LITTLE
DAILY ACTIVITIY SCORE: 18
CLIMB 3 TO 5 STEPS WITH RAILING: A LITTLE
SUGGESTED CMS G CODE MODIFIER DAILY ACTIVITY: CK
TOILETING: A LITTLE
STANDING UP FROM CHAIR USING ARMS: A LITTLE
EATING MEALS: A LITTLE
DRESSING REGULAR UPPER BODY CLOTHING: A LITTLE
HELP NEEDED FOR BATHING: A LITTLE
SUGGESTED CMS G CODE MODIFIER MOBILITY: CK
MOVING FROM LYING ON BACK TO SITTING ON SIDE OF FLAT BED: A LITTLE
MOVING TO AND FROM BED TO CHAIR: A LITTLE
PERSONAL GROOMING: A LITTLE

## 2024-12-13 ASSESSMENT — PAIN DESCRIPTION - PAIN TYPE: TYPE: ACUTE PAIN

## 2024-12-13 NOTE — DISCHARGE SUMMARY
[facility] Internal Medicine Discharge Summary    Attending: Teresa Tidwell M.d.  Senior Resident: Jarvis Girard  Intern:  Johnna Argueta  Contact Number: [phone]    CHIEF COMPLAINT ON ADMISSION  Chief Complaint   Patient presents with    Abdominal Pain     X months.  Pt seen here yesterday for similar complaint.      Elevated Glucose     Pt arrives with a FSBG=  429.  He reports he has not taken his lantus x 4 days.  He reports he has not eaten much over last four days.      Suicidal Ideation     Pt reports feeling suicidal last night after being discharged home       Reason for Admission  EMS     Admission Date  12/6/2024    CODE STATUS  Full Code    HPI & HOSPITAL COURSE  Christoph Taylor is a 59 year old male patient with PMHx of T2DM with neuropathy (HbA1C: 13), severe LA class D oesophagitis, CKD, HLD, Chronic lower back and neck pain with motor neuropathy of upper B/L limbs, and previous hospitalization for DKA, recurrent oesophagitis presented to the hospital on 12/6/2024 via [facility] for intractable abdominal pain, hyperglycemia and suicidal ideation. He was recently seen in ED on 12/5 for similar abdominal pain, and sent home when no acute abnormalities were detected.     On this admission his abdominal pain is located to the same spot, left upper quadrant under his ribs. Symptomatologies are in keeping with likely peptic ulcer disease as the pain is trigger by oral intake, and relieved with anti-acids. His last EGD was done in 3/2023 which showed severe oesophagitis. Repeat EGD on 12/9 showed esophageal findings consistent with LA class D erosive esophagitis, Hiatal hernia and prominent duodenal ampullae.  GI recommends: Follow up path  -Will need PPI BID x 8 weeks then once daily indefinitely  -Carafate slurry QID x 2 weeks then stop  -Gastroparesis diet  -Good control of diabetes  -Reglan with good PCP or GI follow up  -Highly emphasize follow up with GI for EGD in 8+ weeks to document healing of  esophagitis and no presence of Pereira's    Patient able to tolerate more foods with reglan and above management, more than his baseline, and I suspect he will be able to tolerate more as the days go on. He still has postprandial pain, which I do not believe will require narcotics to treat, and should subside with time.    His poorly controlled diabetes is a result of noncompliance which is due to poor understanding and fear of insulin therapy (which had previously resulted in reported cardiac arrest). Diabetic education consulted. He is placed on glargine 20units and SSI.  A jiménez catheter was placed due to retention of urine. We believe it is likely he will need a jiménez catheter chronically for likely neurogenic bladder 2/2 to diabetes. He will need a Urology outpatient follow up    Incidental finding of a t12 compression fracture on CTA chest, which is chronic. Lidocaine patches prescribed.  He also has chronic bilateral motor neuropathy in the ulnar and median nerve distributions. Would recommend Outpatient orthopedics, and pain management consult.    During hospitalization patient expressed suicidal thoughts, in the context of severe depression and hopelessness given all his medical conditions he is trying to juggle. Legal hold was placed and recommendation by psychiatry to start Effexor for mood. On 12/12 Psychiatry removed legal hold. He will need outpatient follow up for Major Depressive disorder secondary to depression due to other medical condition    Patient will need subacute rehab for both physical and occupational therapy. His social situation is not optimal as he lives alone, and has difficulty pulling up insulin on his own. He has family in [place], and we recommended to patient that he try to live with them moving forward.      Therefore, he is discharged in good and stable condition to skilled nursing facility.    The patient met 2-midnight criteria for an inpatient stay at the time of  discharge.    Discharge Date  12/13/2024    Physical Exam on Day of Discharge  Physical Exam  Constitutional:       General: He is not in acute distress.  HENT:      Mouth/Throat:      Mouth: Mucous membranes are moist.   Eyes:      Pupils: Pupils are equal, round, and reactive to light.   Cardiovascular:      Rate and Rhythm: Normal rate and regular rhythm.      Pulses: Normal pulses.      Heart sounds: No murmur heard.  Pulmonary:      Effort: Pulmonary effort is normal. No respiratory distress.      Breath sounds: No wheezing.   Abdominal:      General: Abdomen is flat.      Palpations: Abdomen is soft.      Tenderness: Tenderness: Left upper quadrant.   Musculoskeletal:         General: Deformity (Left foot) present. Normal range of motion.   Skin:     Capillary Refill: Capillary refill takes less than 2 seconds.   Neurological:      General: No focal deficit present.      Mental Status: He is alert and oriented to person, place, and time.      Motor: No weakness.   Psychiatric:      Comments: depressed         FOLLOW UP ITEMS POST DISCHARGE  Urology Outpatient for neurogenic bladder  Orthopedic Outpatient for t12 compression fracture and bilateral motor neuropathy  Gastroeneterology for esophagitis and biopsy follow up  Mental Health for Major Depression    DISCHARGE DIAGNOSES  Principal Problem:    Acute respiratory failure (HCC) (POA: Yes)  Active Problems:    Intractable left upper quadrant abdominal pain (POA: Yes)    Leukocytosis (POA: Yes)    CKD (chronic kidney disease) (POA: Yes)    Uncontrolled type 2 diabetes mellitus with hyperglycemia (HCC) (POA: Yes)    Urinary retention (POA: Yes)    Chronic midline low back pain (POA: Unknown)    Suicidal ideations (POA: Unknown)    Major depressive disorder (POA: Unknown)    Other lesions of median nerve, bilateral upper limbs (POA: Unknown)    Lesion of ulnar nerve, bilateral upper limbs (POA: Unknown)      Overview: Likely chronic      Ulnar clawing and thenar  muscle wasting bilaterally    Sleep apnea (POA: Unknown)    Gastroparesis (POA: Unknown)  Resolved Problems:    * No resolved hospital problems. *      FOLLOW UP  No future appointments.  Orin Darnell, FABBY.P.R.N.  [address]  [facility] [id number] [phone]    Schedule an appointment as soon as possible for a visit       [facility]  [address] [address]  [phone]          MEDICATIONS ON DISCHARGE     Medication List        START taking these medications        Instructions   enoxaparin 40 MG/0.4ML Sosy inj  Commonly known as: Lovenox   Inject 40 mg under the skin every day at 6 PM.  Dose: 40 mg     GI Cocktail (hyoscyamine-lidocaine-Maalox)   Doctor's comments: Ingredients: 34 mL hyoscyamine 0.125 mg/5 mL, 126 mL Maalox, and 40 mL viscous lidocaine 2%.  Take 30 mL by mouth every 6 hours as needed (for severe abdominal pain).  Dose: 30 mL     guaiFENesin dextromethorphan 100-10 MG/5ML Syrp syrup  Commonly known as: Robitussin DM   Take 10 mL by mouth every 6 hours as needed for Cough.  Dose: 10 mL     lidocaine 4 % Ptch  Start taking on: December 14, 2024  Commonly known as: Asperflex   Place 3 Patches on the skin every 24 hours.  Dose: 3 Patch     melatonin 5 mg Tabs   Take 1 Tablet by mouth every evening.  Dose: 5 mg     metoclopramide 5 MG tablet  Commonly known as: Reglan   Take 1 Tablet by mouth 3 times a day before meals.  Dose: 5 mg     ondansetron 4 MG Tbdp  Commonly known as: Zofran ODT   Take 1 Tablet by mouth every four hours as needed for Nausea/Vomiting (give PO if no IV route available).  Dose: 4 mg     ondansetron 4 MG/2ML Soln injection  Commonly known as: Zofran   Infuse 2 mL into a venous catheter every four hours as needed for Nausea.  Dose: 4 mg     polyethylene glycol/lytes Pack  Commonly known as: Miralax   Take 1 Packet by mouth 1 time a day as needed (if no bowel movement in last 2 days).  Dose: 17 g     senna-docusate 8.6-50 MG Tabs  Commonly  known as: Pericolace Or Senokot S   Take 2 Tablets by mouth every evening.  Dose: 2 Tablet     sucralfate 1 GM/10ML Susp  Commonly known as: Carafate   Take 10 mL by mouth 4 Times a Day,Before Meals and at Bedtime.  Dose: 1 g     tamsulosin 0.4 MG capsule  Start taking on: December 14, 2024  Commonly known as: Flomax   Take 1 Capsule by mouth 1/2 hour after breakfast.  Dose: 0.4 mg     venlafaxine 37.5 MG Tabs  Start taking on: December 14, 2024  Commonly known as: Effexor   Take 1 Tablet by mouth every day.  Dose: 37.5 mg            CHANGE how you take these medications        Instructions   omeprazole 40 MG delayed-release capsule  What changed:   medication strength  how much to take  when to take this  Commonly known as: PriLOSEC   Take 1 Capsule by mouth 2 times a day.  Dose: 40 mg            CONTINUE taking these medications        Instructions   acetaminophen 325 MG Tabs  Commonly known as: Tylenol   Take 2 Tablets by mouth every 6 hours as needed for Mild Pain.  Dose: 650 mg     Alcohol Prep 70 % Pads   Doctor's comments: Per formulary preference. ICD-10 code: E11.65 Uncontrolled type 2 Diabetes Mellitus  Wipe site with prep pad prior to injection.     BD Pen Needle Nata U/F  Generic drug: Insulin Pen Needle 32 G x 4 mm   Doctor's comments: Per patient/formulary preference. ICD-10 code: E11.65 Uncontrolled type 2 Diabetes Mellitus  Use one pen needle in pen device to inject insulin four times daily.     FreeStyle Barb 14 Day [place]   To use with freestyle barb sensor     FreeStyle Barb 14 Day Sensor Misc   1 Each every 14 days.  Dose: 1 Each     gabapentin 100 MG Caps  Commonly known as: Neurontin   Take 1 Capsule by mouth 3 times a day.  Dose: 100 mg     Lantus SoloStar 100 UNIT/ML Sopn injection  Generic drug: insulin glargine   Inject 20 Units under the skin every evening.  Dose: 20 Units     NovoLOG FlexPen 100 UNIT/ML solution for injection  Generic drug: insulin aspart   Inject 3-14 Units under  the skin 3 times a day before meals. For glucose:  70 - 150 mg/dL = 0 Units  151 - 200 mg/dL = 3 Units  201 - 250 mg/dL = 4 Units  251 - 300 mg/dL = 7 Units  301 - 350 mg/dL = 10 Units  351 - 400 mg/dL = 12 Units  Over 400 mg/dL  = 14 Units  Dose: 3-14 Units     OneTouch Ultra strip  Generic drug: glucose blood   Use 1 Strip for Blood glucose checks  Dose: 1 Each     TechLite Lancets Misc   Doctor's comments: Or per formulary preference. ICD-10 code: E11.65 Uncontrolled type 2 Diabetes Mellitus  Use one lancet to test blood sugar three times daily before meals.     True Metrix Meter w/Device Kit   Doctor's comments: Or per formulary preference. ICD-10 code: E11.65 Uncontrolled type 2 Diabetes Mellitus  Test blood sugar as recommended by provider.            STOP taking these medications      aspirin 81 MG EC tablet     cyclobenzaprine 5 mg tablet  Commonly known as: Flexeril     ibuprofen 600 MG Tabs  Commonly known as: Motrin     loperamide 2 MG Caps  Commonly known as: Imodium     promethazine 12.5 MG tablet  Commonly known as: Phenergan              Allergies  Allergies   Allergen Reactions    Ketamine Unspecified     aggressive       DIET  Orders Placed This Encounter   Procedures    Diet Order Diet: Consistent CHO (Diabetic) (and gi soft)     Standing Status:   Standing     Number of Occurrences:   1     Order Specific Question:   Diet:     Answer:   Consistent CHO (Diabetic) [4]     Comments:   and gi soft       ACTIVITY  As tolerated.  Weight bearing as tolerated    CONSULTATIONS  Psychiatry  Gastroenterology    PROCEDURES  None    LABORATORY  Lab Results   Component Value Date    SODIUM 139 12/13/2024    POTASSIUM 4.1 12/13/2024    CHLORIDE 101 12/13/2024    CO2 30 12/13/2024    GLUCOSE 162 (H) 12/13/2024    BUN 24 (H) 12/13/2024    CREATININE 0.71 12/13/2024        Lab Results   Component Value Date    WBC 5.7 12/13/2024    HEMOGLOBIN 8.6 (L) 12/13/2024    HEMATOCRIT 27.1 (L) 12/13/2024    PLATELETCT 216  12/13/2024        Total time of the discharge process exceeds 47 minutes.

## 2024-12-13 NOTE — DISCHARGE PLANNING
DPA sent SNF blanket to [facility] per RN CM request.     [id number]  JESSIE informed by RN DAVID that [facility] ([facility]) has requested updated PT/OT notes before they can submit for insurance authorization.   MD Team aware and confirmed consult is ordered.

## 2024-12-13 NOTE — CARE PLAN
The patient is Stable - Low risk of patient condition declining or worsening    Shift Goals  Clinical Goals: pain management, monitor vitals, manage blood sugars  Patient Goals: pain mangement  Family Goals: vicente    Progress made toward(s) clinical / shift goals:      Problem: Knowledge Deficit - Standard  Goal: Patient and family/care givers will demonstrate understanding of plan of care, disease process/condition, diagnostic tests and medications  Description: Target End Date:  1-3 days or as soon as patient condition allows    Document in Patient Education    1.  Patient and family/caregiver oriented to unit, equipment, visitation policy and means for communicating concern  2.  Complete/review Learning Assessment  3.  Assess knowledge level of disease process/condition, treatment plan, diagnostic tests and medications  4.  Explain disease process/condition, treatment plan, diagnostic tests and medications  Outcome: Progressing  Note: Patient has been educated on plan of care, medications, and safety. No further questions from patient      Problem: Provide Safe Environment  Goal: Suicide environmental safety, protocols, policies, and practices will be implemented  Description: Target End Date:  resolve day 1    1.  Remove objects or personal belongings that may cause harm or injury to self or others  2.  Dietary tray modifications (paperware)  3.  Provide a safe environment  4.  Render close patient supervision by sustaining observation or awareness of the patient at all times  Outcome: Progressing  Note: Legal hold was lifted during day shift 12/12. Patient no longer experience SI thoughts.      Problem: Fall Risk  Goal: Patient will remain free from falls  Description: Target End Date:  Prior to discharge or change in level of care    Document interventions on the Lizette Marroquin Fall Risk Assessment    1.  Assess for fall risk factors  2.  Implement fall precautions  Outcome: Progressing  Note: Patient was educated  on fall risk and safety. Bed is locked and in lowest position, bed alarm on, call light placed within reach, and frequent rounding done      Problem: Pain - Standard  Goal: Alleviation of pain or a reduction in pain to the patient’s comfort goal  Description: Target End Date:  Prior to discharge or change in level of care    Document on Vitals flowsheet    1.  Document pain using the appropriate pain scale per order or unit policy  2.  Educate and implement non-pharmacologic comfort measures (i.e. relaxation, distraction, massage, cold/heat therapy, etc.)  3.  Pain management medications as ordered  4.  Reassess pain after pain med administration per policy  5.  If opiods administered assess patient's response to pain medication is appropriate per POSS sedation scale  6.  Follow pain management plan developed in collaboration with patient and interdisciplinary team (including palliative care or pain specialists if applicable)  Outcome: Progressing  Note: Patient was educated on pain scale and pain medications. Narcotics were to be avoided per MD. Patient had increased shoulder pain but lidocaine patched were placed to reduce pain. PRN gi cocktail was also utilized for stomach pain after meals.        Patient is not progressing towards the following goals:

## 2024-12-13 NOTE — DIETARY
"Nutrition Services: Initial Assessment     Day 7 of admit. Christoph Taylor is 59 y.o., male with admitting DX of Respiratory failure (HCC) [J96.90].    Consult received for diabetes diet education and MST score of 3 for reported unintentional weight loss of 14-23 lb x 3 months and decreased appetite.    Current Hospital Problems List:    Acute respiratory failure  Chronic midline low back pain  CKD  Gastroparesis  Intractable left upper quadrant abdominal pain  Lesion of ulnar nerve, bilateral upper limbs  Leukocytosis  Major depressive disorder  Other lesions of median nerve, bilateral upper limbs  Sleep apnea  Suicidal ideations  Uncontrolled type 2 diabetes mellitus with hyperglycemia  Urinary retention       Nutrition Assessment:      Height: 182.9 cm (6' 0.01\")  Weight: 77.6 kg (171 lb 1.2 oz)  Weight taken via: Bed Scale on 12/7  BMI Calculated: 23.17  BMI Classification: WNL     Wt Readings from Last 23 Encounters:   12/07/24 77.6 kg (171 lb 1.2 oz)   12/05/24 72.6 kg (160 lb)   11/19/24 72.6 kg (160 lb)   10/30/24 79.4 kg (175 lb)   10/29/24 78.9 kg (174 lb)   10/19/24 78.5 kg (173 lb 1 oz)   10/13/24 76.7 kg (169 lb 1.5 oz)   10/13/24 77.1 kg (170 lb)   10/08/24 77.4 kg (170 lb 9.6 oz)   08/29/24 77.4 kg (170 lb 9.6 oz)   08/24/24 72.6 kg (160 lb)   08/12/24 62.8 kg (138 lb 7.2 oz)   07/26/24 71.1 kg (156 lb 12 oz)   07/21/24 79.4 kg (175 lb)   06/15/24 76.3 kg (168 lb 3.4 oz)   06/01/24 73.5 kg (162 lb 0.6 oz)   05/21/24 74 kg (163 lb 2.3 oz)   05/11/24 80.7 kg (177 lb 14.6 oz)   05/06/24 81.6 kg (180 lb)   02/26/24 81.2 kg (179 lb)   02/25/24 81.2 kg (179 lb 0.2 oz)   01/28/24 71.7 kg (158 lb 1.1 oz)   08/31/23 77.3 kg (170 lb 6.4 oz)   Per chart review, pt has maintained a relatively stable weight over the last year. Likely that weights from 10/29-12/5 are stated, thus pt has been trending around ~173 +/- 2 lb. Pt with no significant weight loss at this time.    Objective:   Pt presented to ED w/ c/o " abdominal pain, elevated glucose, and suicidal ideation  Pertinent Medical Hx: acute esophagitis, acute on chronic anemia, acute on chronic respiratory failure, KRISTAL, CKD stage III, diabetes, DKA, hypercholesteremia  Pertinent Labs: POC glucose 163, A1c 13 (10/15/24), BUN 24  Pertinent Meds: GI cocktail, Lantus, SSI, Reglan, Zofran, BM protocol, carafate  Skin/Wounds:  no pressure wounds or edema noted  Food Allergies: NKFA  Last BM:  Type 7: Watery, no solid pieces-entirely liquid  12/11/24     Diet Education:  RD able to visit pt at bedside to provide gastroparesis and diabetes diet education. RD discussed foods recommended and not recommended with gastroparesis, carbohydrates, carb counting, reading nutrition labels, creating a well balanced meal, sugar-containing beverages, and provided handouts reinforcing topics discussed. Pt demonstrated readiness and evidence of learning. RD able to answer all questions to patient's satisfaction.     Current Diet Order/Intake:   Consistent CHO diet and GI soft  PO intake per ADLs was <25% of most meals in first few days of admit, over last 4 days all meals have been % consumed     Subjective:   Patient reported UBW: 185 lb  Pt reporting appetite improving but not at 100%. He noted losing 10 lb in the past 1.5-2 months 2/2 decreased energy intake.    Nutrition Focused Physical Exam (NFPE)  Weight Loss: none  Muscle Mass: Well Nourished  Subcutaneous Fat: Well Nourished  Fluid Accumulation: none  Reduced  Strength: N/A in acute care setting.    Nutrition Diagnosis:      Food and Nutrition-Related Knowledge Deficit related to lack or prior education as evidenced by need for further education.    Based on RD assessment at this time, Patient does not meet criteria in congruence with ASPEN/Academy guidelines for malnutrition    Nutrition Interventions:      Encourage ongoing PO intake >50%  No other education needs identified at this time. Consider referral to outpatient  nutrition services for continuation of education as indicated or per pt preferences.   Patient aware of active plan of care as appropriate.       Nutrition Monitoring and Evaluation:      Monitor nutrition POC  Record PO intake as % in ADLs  Monitor vital signs pertinent to nutrition.    RD following and will provide updated recommendations as indicated.      Mohini Hall R.D.                                         ASP[employer]/AND CRITERIA FOR MALNUTRITION

## 2024-12-13 NOTE — PROGRESS NOTES
Report called to [facility], spoke with Ernie CASPER     IV removed     Discharge paperwork reviewed with patient and signed     Personal belonging at bedside.

## 2024-12-13 NOTE — PROGRESS NOTES
[facility] Internal Medicine Daily Progress Note    Date of Service  12/12/2024    R Team: R IM Blue Team   Attending: Teresa Tidwell M.d.  Senior Resident: Jarvis Girard  Intern:  Johnna Argueta  Contact Number: [phone]    Chief Complaint  Christoph Taylor is a 59 y.o. male admitted 12/6/2024 with intractable abdominal pain.    Hospital Course  Christoph Taylor is a 59 year old male patient with PMHx of T2DM with neuropathy (HbA1C: 13), severe LA class D oesophagitis, CKD, HLD, Chronic lower back and neck pain with motor neuropathy of upper B/L limbs, and previous hospitalization for DKA, recurrent oesophagitis presented to the hospital on 12/6/2024 via [facility] for intractable abdominal pain, hyperglycemia and suicidal ideation. He was recently seen in ED on 12/5 for similar abdominal pain, and sent home when no acute abnormalities were detected.     On this admission his abdominal pain is located to the same spot, left upper quadrant under his ribs. Symptomatologies are in keeping with likely peptic ulcer disease as the pain is trigger by oral intake, and relieved with anti-acids. His last EGD was done in 3/2023 which showed severe oesophagitis.    His poorly controlled diabetes is a result of noncompliance which is due to poor understanding and fear of insulin therapy (which had previously resulted in reported cardiac arrest). Diabetic education has been consulted.    During hospitalization patient expressed suicidal thoughts, in the context of severe depression and hopelessness given all his medical conditions he is trying to juggle. Legal hold was placed and recommendation by psychiatry to start Effexor for mood and transfer to inpatient psychiatric hospital when medically cleared.    12/9:  He underwent an EGD on 12/9, which showed esophageal findings consistent with LA class D erosive esophagitis, Hiatal hernia and prominent duodenal ampullae.  -Patient started on Metoclopramide 5 mg PO TID for  gastroparesis  -His jiménez catheter was removed yesterday and he passed his voiding trial.    12/10  -Patient was seen by Psychiatry today and as per their assessment he will continue to be on legal hold.  -Palliative Consult was put for pain management and advance care planning but as per palliative, considering he is on legal hold, He is not legally bill to participate in goals of care/advance care planning discussion.  -His blood sugar levels have been low overnight with a range of  mg therefore, His Insulin Glargine has been reduced to 17 U from 20 U.  -Patient failed his voiding trial and a jiménez catheter was placed again        Interval Problem Update  Overnight events:  Patients foleys catheter was placed again as he failed voiding trial overnight.    12/12:  This morning, Patient complains of left shoulder pain and as per him he has been having this problem from last 2-3 years post his rotator cuff injury.    -Patient was evaluated by psychiatry today and legal hold has been lifted  -Patient is medically cleared and will try to transfer him to SNF tomorrow      I have discussed this patient's plan of care and discharge plan at IDT rounds today with Case Management, Nursing, Nursing leadership, and other members of the IDT team.    Consultants/Specialty  psychiatry    Code Status  Full Code    Disposition  Medically Cleared  I have placed the appropriate orders for post-discharge needs.    Review of Systems  Review of Systems   Constitutional:  Positive for malaise/fatigue and weight loss. Negative for chills and fever.   HENT:  Negative for hearing loss.    Eyes:  Negative for blurred vision.   Respiratory:  Negative for cough, hemoptysis and shortness of breath.    Cardiovascular:  Negative for chest pain and palpitations.   Gastrointestinal:  Positive for abdominal pain, heartburn and nausea. Negative for constipation, diarrhea and vomiting.   Genitourinary:  Negative for dysuria, hematuria and  urgency.   Musculoskeletal:  Negative for myalgias.   Neurological:  Negative for dizziness and headaches.   Psychiatric/Behavioral:  Negative for depression.         Physical Exam  Temp:  [36.5 °C (97.7 °F)-36.9 °C (98.4 °F)] 36.9 °C (98.4 °F)  Pulse:  [80-98] 98  Resp:  [17-20] 17  BP: (106-149)/(60-99) 122/77  SpO2:  [95 %-98 %] 96 %    Physical Exam  Constitutional:       General: He is not in acute distress.  HENT:      Mouth/Throat:      Mouth: Mucous membranes are moist.   Eyes:      Pupils: Pupils are equal, round, and reactive to light.   Cardiovascular:      Rate and Rhythm: Normal rate and regular rhythm.      Pulses: Normal pulses.      Heart sounds: No murmur heard.  Pulmonary:      Effort: Pulmonary effort is normal. No respiratory distress.      Breath sounds: No wheezing.   Abdominal:      General: Abdomen is flat.      Palpations: Abdomen is soft.      Tenderness: Tenderness: Left upper quadrant.   Musculoskeletal:         General: Deformity (Left foot) present. Normal range of motion.   Skin:     Capillary Refill: Capillary refill takes less than 2 seconds.   Neurological:      General: No focal deficit present.      Mental Status: He is alert and oriented to person, place, and time.      Motor: No weakness.   Psychiatric:      Comments: depressed         Fluids    Intake/Output Summary (Last 24 hours) at 12/12/2024 1721  Last data filed at 12/12/2024 1439  Gross per 24 hour   Intake 615 ml   Output 2800 ml   Net -2185 ml       Laboratory  Recent Labs     12/10/24  0138 12/11/24  0025 12/12/24  0052   WBC 4.0* 5.8 6.3   RBC 3.27* 3.37* 3.62*   HEMOGLOBIN 8.7* 9.0* 9.6*   HEMATOCRIT 26.8* 27.3* 29.7*   MCV 82.0 81.0* 82.0   MCH 26.6* 26.7* 26.5*   MCHC 32.5 33.0 32.3   RDW 40.0 39.2 39.8   PLATELETCT 161* 174 201   MPV 9.6 9.4 9.3     Recent Labs     12/10/24  0138 12/11/24  0025 12/12/24  0052   SODIUM 141 139 139   POTASSIUM 3.7 3.6 4.2   CHLORIDE 105 102 101   CO2 31 32 32   GLUCOSE 166* 161*  231*   BUN 13 14 20   CREATININE 0.55 0.53 0.64   CALCIUM 7.9* 8.2* 8.1*                   Imaging  CT-CTA CHEST PULMONARY ARTERY W/ RECONS   Final Result      1.  No CT evidence of pulmonary emboli.      2.  Mild wedge type compression fracture of T12 vertebral body of indeterminate age likely chronic.            DX-CHEST-LIMITED (1 VIEW)   Final Result         No acute cardiopulmonary abnormalities are identified.           Assessment/Plan  Problem Representation:    * Acute respiratory failure (HCC)- (present on admission)  Assessment & Plan  Patient presented with acute hypoxic respiratory failure of unknown etiology which is currently resolving.   Chest x-ray done on admission was  negative for pneumonia,CTA of pulmonary artery negative for PE.  Consulting clinic on 7/2024 was normal.  Stress test in October 2024 was normal. He was given ceftriaxone empirically in the ER for pneumonia, however he his procalcitonin was negative. He's currently on 1 L of oxygen while sleeping.  - possibly due to undiagnosed sleep apnea, okay for oxygen while sleeping.  - wean off oxygen while awake  - Outpatient sleep study  - Home O2 eval    Intractable left upper quadrant abdominal pain- (present on admission)  Assessment & Plan  Patient complains of intractable left upper quadrant abdominal pain which usually occurs with oral intake. relieved with GI cocktail. He has a hx of severe LA class D esophagitis. Last EGD was done on 3/14/2023. He reports significant reduction in his appetite and weight as well.  EGD was done today on 12/9/2024 which showed;  30-38 cm circumferential white mucosa consistent with LA class D erosive oesophagitis.  38-42 cm hiatal hernia, no pyloric stenosis  Prominent Ampullae in the Duodenum  Per GI recs;  -Continue Omeprazole BID for 8 weeks and then daily indefinitely  -Continue  Sucralfate QID 2 weeks and then stop  -Start on metoclopramide 5mg PO TID   -Continue GI cocktail PRN  -Start on  Gastroparesis diet  -Follow up with GI for EGD in 8+ weeks for healing of esophagitis and no presence of Pereira's esophagus.        Gastroparesis  Assessment & Plan  -Continue Metoclopromide 5mg PO TID     Sleep apnea  Assessment & Plan  Likely, due to desaturations while sleeping  - will need sleep consultation outpatient.   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    Major depressive disorder  Assessment & Plan  Depressive mood In context of several medical conditions and disabilities impacting quality of life  - psych following  - Continue legal hold  per psych  - Start Effexor (venlafaxine) per psych  - Inpatient psych transfer once medically cleared    Suicidal ideations  Assessment & Plan  Patient was placed on legal hold with sitter  - psych consult, appreciate recs  Passive thoughts, No plan  - see major depressive disorder plan    Chronic midline low back pain  Assessment & Plan  CTA chest revealed T12 fracture - likely chronic in nature  - Continue pain management  - Continue lidocaine  - avoid dilaudid  - oxycodone for breakthrough, however will likely discontinue once for sure etiology is gastric    LLQ abdominal pain  Assessment & Plan  It is chronic.  CT of the abdomen from 12/5 did not reveal    Urinary retention- (present on admission)  Assessment & Plan  Patient is retaining urine and a foleys catheter was placed. He was given a voiding trial but he failed it and foleys was placed again.  -Indwelling urinary catheter protocol  -Continue Tamsulosin 0.4 mg once a day  -Urology consult outpatient    Uncontrolled type 2 diabetes mellitus with hyperglycemia (HCC)- (present on admission)  Assessment & Plan  He presented with blood sugar in the 400s  due to noncompliance with long-acting insulin.His blood sugar levels have been low overnight with a range of  mg therefore, His Insulin Glargine has been reduced to 17 U from 20 U.  -Continue Insulin Glargine 17 units every evening  -Continue Insulin sliding scale  -Insulin Compliance counseling  -Diabetic educator    CKD (chronic kidney disease)- (present on admission)  Assessment & Plan  Patient had a creatinine 1.12, BUN 31 on presentation and was somewhat depleted intravascularly. He received 1L of IV fluids in the ED. RFTs on repeat shows improvement with Cr: 0.84 and BUN: 21.    -Avoid nephrotoxins  -Continue to monitor    Leukocytosis- (present on admission)  Assessment & Plan  Patient had a WBC 14.2 on admission, likely reactive, 2/2 hyperglycemia or esophagitis as CT-Scan of the abdomen from yesterday showed possible esophagitis.  empirically received ceftriaxone in ER. Chest x-ray negative for pneumonia, CTPE of the chest was negative. UA negative for LE, nitrites, WBC and bacteria. WBC as of 12/8 is at 6.0.  -No antibiotics for now         VTE prophylaxis: enoxaparin ppx    I have performed a physical exam and reviewed and updated ROS and Plan today (12/12/2024). In review of yesterday's note (12/11/2024), there are no changes except as documented above.

## 2024-12-13 NOTE — THERAPY
"Occupational Therapy  Daily Treatment     Patient Name: Christoph Taylor  Age:  59 y.o., Sex:  male  Medical Record #: [id number]  Today's Date: 12/13/2024     Precautions: Fall Risk      Assessment  Pt was seen OT tx pt was cooperative w/session, completed FB dressing w/min A, CGA w/standing and txfs decreased FM/grasp impacting ADL's and pain in LLE foot. Pt motivated for recovery and improved functional abilities. Hand off to PT for next session.     Plan  Treatment Plan Status: Continue Current Treatment Plan  Type of Treatment: Self Care / Activities of Daily Living, Adaptive Equipment, Neuro Re-Education / Balance, Therapeutic Exercises, Therapeutic Activity, Family / Caregiver Training  Treatment Frequency: 3 Times per Week  Treatment Duration: Until Therapy Goals Met    DC Equipment Recommendations: Unable to determine at this time  Discharge Recommendations: Recommend post-acute placement for additional occupational therapy services prior to discharge home    Subjective    \"I realize I need to get stronger\"      Objective     12/13/24 1005   Time In/Time Out   Therapy Start Time 0945   Therapy End Time 1005   Total Therapy Time 20   Treatment Charges   Charges Yes   OT Self Care / ADL (Units) 1   Total Time Spent   OT Time Spent Yes   OT Self Care / ADL (Minutes) 20   OT Total Time Spent (Calculated) 20   Precautions   Precautions Fall Risk   Comments -   Pain 0 - 10 Group   Therapist Pain Assessment Prior to Activity;During Activity;0;Nurse Notified   Cognition    Cognition / Consciousness WDL   Level of Consciousness Alert   Comments cooperative   Passive ROM Upper Body   Passive ROM Upper Body WDL   Active ROM Upper Body   Active ROM Upper Body  X   Dominant Hand Right   Comments chronic hand/digit limitations   Strength Upper Body   Upper Body Strength  X   Gross Strength Generalized Weakness, Equal Bilaterally.    Comments poor distal functional use grasp   Balance   Sitting Balance (Static) Good "   Sitting Balance (Dynamic) Fair +   Standing Balance (Static) Fair   Standing Balance (Dynamic) Fair -   Weight Shift Sitting Good   Weight Shift Standing Fair   Skilled Intervention Verbal Cuing;Tactile Cuing;Facilitation;Compensatory Strategies   Comments w/fww   Bed Mobility    Supine to Sit Supervised   Activities of Daily Living   Grooming Contact Guard Assist;Standing   Upper Body Dressing Minimal Assist   Lower Body Dressing Minimal Assist   Toileting   (NT jiménez in place)   Skilled Intervention Verbal Cuing;Tactile Cuing;Compensatory Strategies   How much help from another person does the patient currently need...   6 Clicks Daily Activity Score 18   Functional Mobility   Sit to Stand Contact Guard Assist   Bed, Chair, Wheelchair Transfer Contact Guard Assist   Mobility EOB sit> room side steps up for PT   Skilled Intervention Verbal Cuing;Tactile Cuing;Facilitation   Activity Tolerance   Comments no overt c/o pain or fatigue   Patient / Family Goals   Patient / Family Goal #1 To get better   Goal #1 Outcome Progressing as expected   Short Term Goals   Short Term Goal # 1 Pt will complete ADL txfs with supv   Goal Outcome # 1 Progressing as expected   Short Term Goal # 2 Pt will complete LB dressing with supv using AE PRN   Goal Outcome # 2 Progressing as expected   Short Term Goal # 3 Pt will complete toileting ADLs with supv   Goal Outcome # 3 Progressing as expected   Short Term Goal # 4 Pt will complete standing g/h routine with supv   Goal Outcome # 4 Progressing as expected   Education Group   Role of Occupational Therapist Patient Response Patient;Acceptance;Explanation;Verbal Demonstration   ADL Patient Response Patient;Acceptance;Explanation;Reinforcement Needed   Occupational Therapy Treatment Plan    O.T. Treatment Plan Continue Current Treatment Plan   Anticipated Discharge Equipment and Recommendations   Discharge Recommendations Recommend post-acute placement for additional  occupational therapy services prior to discharge home   Interdisciplinary Plan of Care Collaboration   IDT Collaboration with  Nursing;Physical Therapist   Patient Position at End of Therapy Seated;Edge of Bed;Other (Comments)  (hand off to PT)   Collaboration Comments RN aware of OT tx and pts efforts   Session Information   Date / Session Number  12/13 #2 (2/3, 12/16)

## 2024-12-13 NOTE — THERAPY
Physical Therapy   Daily Treatment     Patient Name: Christoph Taylor  Age:  59 y.o., Sex:  male  Medical Record #: [id number]  Today's Date: 12/13/2024     Precautions  Precautions: (P) Fall Risk  Comments: -    Assessment    Pt seen for PT treatment. Pt presenting with BLE weakness, impaired static and dynamic standing balance and unsteady gait with FWW.   Recommend continued inpatient as well as post acute placement.     Plan    Treatment Plan Status: (P) Continue Current Treatment Plan  Type of Treatment: Bed Mobility, Equipment, Family / Caregiver Training, Gait Training, Manual Therapy, Neuro Re-Education / Balance, Self Care / Home Evaluation, Stair Training, Therapeutic Activities, Therapeutic Exercise  Treatment Frequency: 3 Times per Week  Treatment Duration: Until Therapy Goals Met    DC Equipment Recommendations: (P) Unable to determine at this time  Discharge Recommendations: (P) Recommend post-acute placement for additional physical therapy services prior to discharge home      Precautions   Precautions Fall Risk   Cognition    Level of Consciousness Alert   Comments pleasant and cooperative.   Passive ROM Lower Body   Comments pt awaiting AFO from .   Strength Lower Body   Gross Strength Generalized Weakness, Equal Bilaterally   Balance   Sitting Balance (Static) Good   Sitting Balance (Dynamic) Good   Standing Balance (Static) Fair +   Standing Balance (Dynamic) Fair   Weight Shift Sitting Good   Weight Shift Standing Fair   Skilled Intervention Verbal Cuing;Tactile Cuing   Comments w/FWW   Bed Mobility    Supine to Sit Supervised   Sit to Supine Supervised   Scooting Supervised   Rolling Supervised   Gait Analysis   Gait Level Of Assist Contact Guard Assist   Assistive Device Front Wheel Walker   Distance (Feet) 20   # of Times Distance was Traveled 1   Deviation Bradykinetic;Shuffled Gait   Weight Bearing Status WBAT   Skilled Intervention Verbal Cuing;Tactile Cuing;Sequencing   Comments  limited distance per pt request due to lack of AFO and proper footwear in hospital   Functional Mobility   Sit to Stand Contact Guard Assist   Bed, Chair, Wheelchair Transfer Contact Guard Assist   Transfer Method Stand Step   6 Clicks Assessment - How much HELP from another person do you currently need... (If the patient hasn't done an activity recently, how much help from another person do you think he/she would need if he/she tried?)   Turning from your back to your side while in a flat bed without using bedrails? 3   Moving from lying on your back to sitting on the side of a flat bed without using bedrails? 3   Moving to and from a bed to a chair (including a wheelchair)? 3   Standing up from a chair using your arms (e.g., wheelchair, or bedside chair)? 3   Walking in hospital room? 3   Climbing 3-5 steps with a railing? 3   6 clicks Mobility Score 18   Short Term Goals    Short Term Goal # 1 Pt will perform supine<>sit from flat bed with SPV to progress bed mobility in 6 visits.   Goal Outcome # 1 Progressing as expected   Short Term Goal # 2 Pt will perform sit<>stand and stand step txfer with FWW and SPV to progress OOB mobility in 6 visits.   Goal Outcome # 2 Progressing as expected   Short Term Goal # 3 Pt will ambulate 25 ft with FWW and SPV to access home in 6 visits.   Goal Outcome # 3 Progressing as expected   Short Term Goal # 4 Pt will navigate 5 stairs with BHR and SPV to access home in 6 visits.   Goal Outcome # 4 Goal not met   Education Group   Role of Physical Therapist Patient Response Patient;Acceptance;Explanation;Demonstration;Verbal Demonstration;Action Demonstration;Reinforcement Needed   Physical Therapy Treatment Plan   Physical Therapy Treatment Plan Continue Current Treatment Plan   Anticipated Discharge Equipment and Recommendations   DC Equipment Recommendations Unable to determine at this time   Discharge Recommendations Recommend post-acute placement for additional physical therapy  services prior to discharge home   Interdisciplinary Plan of Care Collaboration   IDT Collaboration with  Nursing;Occupational Therapist   Patient Position at End of Therapy Seated;Chair Alarm On;Tray Table within Reach;Phone within Reach;Call Light within Reach   Collaboration Comments staff updated.   Session Information   Date / Session Number  12/13-2 ( 2/3, 12/16)

## 2024-12-13 NOTE — DISCHARGE PLANNING
Case Management Discharge Planning    Admission Date: 12/6/2024  GMLOS: 5  ALOS: 7    6-Clicks ADL Score: 18  6-Clicks Mobility Score: 18      Anticipated Discharge Dispo: Discharge Disposition: D/T to psych hosp or distinct part unit (65)    DME Needed: No    Action(s) Taken: Updated Provider/Nurse on Discharge Plan and Authorization Sent  Called [facility], notified that PT/OT notes are in, they will submit insurance authorization.    Escalations Completed: None    Medically Clear: Yes    Next Steps: Case Management will continue to follow for discharge planning needs.    Barriers to Discharge: Pending Insurance Authorization    Is the patient up for discharge tomorrow: No

## 2024-12-13 NOTE — CARE PLAN
The patient is Stable - Low risk of patient condition declining or worsening    Shift Goals  Clinical Goals: Safety, pain management, increase acitivity, monitor VS and I/O  Patient Goals: Rest, pain management  Family Goals: KATHERINE    Progress made toward(s) clinical / shift goals:  Patient taken off of psych hold today and off of SI precautions (see psych note). Up to the chair for meals encouraged and patient agreed to get up for lunch. Pain controlled with prn's (see MAR). Belongings returned to patient. Will continue to monitor.

## 2024-12-14 NOTE — DISCHARGE PLANNING
DC Transport Scheduled    Transport Company Scheduled:  [employer]  Spoke with NATALEE at [facility] to schedule transport.      Scheduled Date: 12/13/2024  Scheduled Time: 1730    Transport Type: [facility]  Destination:   [facility]   Destination address: [address] [place] [id number]        Notified care team of scheduled transport via [employer].     If there are any changes needed to the DC transportation scheduled, please contact [facility] at ext. [phone] between the hours of 6923-3206. If outside those hours, contact the ED Case Manager at ext. [phone].

## 2024-12-14 NOTE — CARE PLAN
The patient is Stable - Low risk of patient condition declining or worsening    Shift Goals  Clinical Goals: Remain safe and free from falls, assess vitals, assess blood sugar AC/HS  Patient Goals: Rest and pain management  Family Goals: KATHERINE    Progress made toward(s) clinical / shift goals:        Problem: Knowledge Deficit - Standard  Goal: Patient and family/care givers will demonstrate understanding of plan of care, disease process/condition, diagnostic tests and medications  Outcome: Met     Problem: Provide Safe Environment  Goal: Suicide environmental safety, protocols, policies, and practices will be implemented  Outcome: Met     Problem: Psychosocial  Goal: Patient's ability to identify and develop effective coping behaviors will improve  Outcome: Met  Goal: Patient's ability to identify and utilize available support systems will improve  Outcome: Met     Problem: Depression  Goal: Patient and family/caregiver will verbalize accurate information about at least two of the possible causes of depression, three-four of the signs and symptoms of depression  Outcome: Met     Problem: Fall Risk  Goal: Patient will remain free from falls  Outcome: Met

## 2024-12-14 NOTE — PROGRESS NOTES
Reached out to ED case manager Bettye Ford in regard to ride .No ride has arrived for patient. Original  time was schedule for 3:30 per Fernando .

## 2025-01-05 ENCOUNTER — PATIENT MESSAGE (OUTPATIENT)
Dept: GASTROENTEROLOGY | Facility: MEDICAL CENTER | Age: 60
End: 2025-01-05
Payer: COMMERCIAL

## 2025-01-05 DIAGNOSIS — B37.81 CANDIDA ESOPHAGITIS (HCC): ICD-10-CM

## 2025-01-05 RX ORDER — FLUCONAZOLE 100 MG/1
100 TABLET ORAL DAILY
Qty: 22 TABLET | Refills: 0 | Status: ON HOLD | OUTPATIENT
Start: 2025-01-05 | End: 2025-01-26

## 2025-01-06 ENCOUNTER — TELEPHONE (OUTPATIENT)
Dept: GASTROENTEROLOGY | Facility: MEDICAL CENTER | Age: 60
End: 2025-01-06
Payer: COMMERCIAL

## 2025-01-06 NOTE — TELEPHONE ENCOUNTER
Pt has not read mychart from Dr Ivan regarding path results from his EGD which showed yeast esophagitis.   Called and spoke to pt. Pt stated he is back home from his stay at [facility]. RX for diflucan sent into [facility] on [address]. Pt stated he is unable to pick the medication up due to no ride. Asked if he has a family member or friend neighbor who can pick it up for him, he stated he did not. He said he might had someone who can pick it up for him in the next few days.     Pt has a PCP appt on 1/13/25 with Orin Darnell. Sent her a staff message to remind pt to  his medication after his appt with her, if he has not already done so.

## 2025-01-09 ENCOUNTER — APPOINTMENT (OUTPATIENT)
Dept: RADIOLOGY | Facility: MEDICAL CENTER | Age: 60
DRG: 542 | End: 2025-01-09
Attending: EMERGENCY MEDICINE
Payer: COMMERCIAL

## 2025-01-09 ENCOUNTER — HOSPITAL ENCOUNTER (INPATIENT)
Facility: MEDICAL CENTER | Age: 60
End: 2025-01-09
Attending: EMERGENCY MEDICINE | Admitting: INTERNAL MEDICINE
Payer: COMMERCIAL

## 2025-01-09 DIAGNOSIS — M47.812 CERVICAL SPONDYLOSIS WITHOUT MYELOPATHY: ICD-10-CM

## 2025-01-09 DIAGNOSIS — K21.9 GASTROESOPHAGEAL REFLUX DISEASE WITHOUT ESOPHAGITIS: ICD-10-CM

## 2025-01-09 DIAGNOSIS — E11.10 DKA, TYPE 2, NOT AT GOAL (HCC): ICD-10-CM

## 2025-01-09 DIAGNOSIS — N30.00 ACUTE CYSTITIS WITHOUT HEMATURIA: ICD-10-CM

## 2025-01-09 DIAGNOSIS — R73.9 HYPERGLYCEMIA: ICD-10-CM

## 2025-01-09 DIAGNOSIS — R93.7 ABNORMAL CT OF THORACIC SPINE: ICD-10-CM

## 2025-01-09 DIAGNOSIS — F33.1 MODERATE EPISODE OF RECURRENT MAJOR DEPRESSIVE DISORDER (HCC): ICD-10-CM

## 2025-01-09 DIAGNOSIS — E11.69 TYPE 2 DIABETES MELLITUS WITH OTHER SPECIFIED COMPLICATION, WITH LONG-TERM CURRENT USE OF INSULIN (HCC): ICD-10-CM

## 2025-01-09 DIAGNOSIS — R53.1 WEAKNESS: ICD-10-CM

## 2025-01-09 DIAGNOSIS — Z79.4 TYPE 2 DIABETES MELLITUS WITH OTHER SPECIFIED COMPLICATION, WITH LONG-TERM CURRENT USE OF INSULIN (HCC): ICD-10-CM

## 2025-01-09 PROBLEM — R82.71 BACTERIURIA: Status: ACTIVE | Noted: 2025-01-09

## 2025-01-09 PROBLEM — R53.81 PHYSICAL DEBILITY: Status: ACTIVE | Noted: 2025-01-09

## 2025-01-09 LAB
ALBUMIN SERPL BCP-MCNC: 3.2 G/DL (ref 3.2–4.9)
ALBUMIN/GLOB SERPL: 1.2 G/DL
ALP SERPL-CCNC: 84 U/L (ref 30–99)
ALT SERPL-CCNC: 21 U/L (ref 2–50)
ANION GAP SERPL CALC-SCNC: 8 MMOL/L (ref 7–16)
APPEARANCE UR: CLEAR
AST SERPL-CCNC: 14 U/L (ref 12–45)
BACTERIA #/AREA URNS HPF: ABNORMAL /HPF
BASOPHILS # BLD AUTO: 0.2 % (ref 0–1.8)
BASOPHILS # BLD: 0.01 K/UL (ref 0–0.12)
BILIRUB SERPL-MCNC: 0.3 MG/DL (ref 0.1–1.5)
BILIRUB UR QL STRIP.AUTO: NEGATIVE
BUN SERPL-MCNC: 17 MG/DL (ref 8–22)
CALCIUM ALBUM COR SERPL-MCNC: 9.2 MG/DL (ref 8.5–10.5)
CALCIUM SERPL-MCNC: 8.6 MG/DL (ref 8.5–10.5)
CASTS URNS QL MICRO: ABNORMAL /LPF (ref 0–2)
CHLORIDE SERPL-SCNC: 95 MMOL/L (ref 96–112)
CO2 SERPL-SCNC: 30 MMOL/L (ref 20–33)
COLOR UR: YELLOW
CREAT SERPL-MCNC: 0.65 MG/DL (ref 0.5–1.4)
EKG IMPRESSION: NORMAL
EOSINOPHIL # BLD AUTO: 0.1 K/UL (ref 0–0.51)
EOSINOPHIL NFR BLD: 1.9 % (ref 0–6.9)
EPITHELIAL CELLS 1715: ABNORMAL /HPF (ref 0–5)
ERYTHROCYTE [DISTWIDTH] IN BLOOD BY AUTOMATED COUNT: 41.1 FL (ref 35.9–50)
FLUAV RNA SPEC QL NAA+PROBE: NEGATIVE
FLUBV RNA SPEC QL NAA+PROBE: NEGATIVE
GFR SERPLBLD CREATININE-BSD FMLA CKD-EPI: 107 ML/MIN/1.73 M 2
GLOBULIN SER CALC-MCNC: 2.6 G/DL (ref 1.9–3.5)
GLUCOSE BLD STRIP.AUTO-MCNC: 330 MG/DL (ref 65–99)
GLUCOSE BLD STRIP.AUTO-MCNC: 334 MG/DL (ref 65–99)
GLUCOSE BLD STRIP.AUTO-MCNC: 382 MG/DL (ref 65–99)
GLUCOSE SERPL-MCNC: 429 MG/DL (ref 65–99)
GLUCOSE UR STRIP.AUTO-MCNC: >=1000 MG/DL
HCT VFR BLD AUTO: 29.2 % (ref 42–52)
HGB BLD-MCNC: 9.7 G/DL (ref 14–18)
IMM GRANULOCYTES # BLD AUTO: 0.01 K/UL (ref 0–0.11)
IMM GRANULOCYTES NFR BLD AUTO: 0.2 % (ref 0–0.9)
KETONES UR STRIP.AUTO-MCNC: ABNORMAL MG/DL
LACTATE SERPL-SCNC: 1 MMOL/L (ref 0.5–2)
LEUKOCYTE ESTERASE UR QL STRIP.AUTO: NEGATIVE
LYMPHOCYTES # BLD AUTO: 0.68 K/UL (ref 1–4.8)
LYMPHOCYTES NFR BLD: 13 % (ref 22–41)
MCH RBC QN AUTO: 27 PG (ref 27–33)
MCHC RBC AUTO-ENTMCNC: 33.2 G/DL (ref 32.3–36.5)
MCV RBC AUTO: 81.3 FL (ref 81.4–97.8)
MICRO URNS: ABNORMAL
MONOCYTES # BLD AUTO: 0.45 K/UL (ref 0–0.85)
MONOCYTES NFR BLD AUTO: 8.6 % (ref 0–13.4)
NEUTROPHILS # BLD AUTO: 3.97 K/UL (ref 1.82–7.42)
NEUTROPHILS NFR BLD: 76.1 % (ref 44–72)
NITRITE UR QL STRIP.AUTO: NEGATIVE
NRBC # BLD AUTO: 0 K/UL
NRBC BLD-RTO: 0 /100 WBC (ref 0–0.2)
NT-PROBNP SERPL IA-MCNC: 1680 PG/ML (ref 0–125)
PH UR STRIP.AUTO: 7.5 [PH] (ref 5–8)
PLATELET # BLD AUTO: 204 K/UL (ref 164–446)
PMV BLD AUTO: 9.1 FL (ref 9–12.9)
POTASSIUM SERPL-SCNC: 4.5 MMOL/L (ref 3.6–5.5)
PROCALCITONIN SERPL-MCNC: <0.05 NG/ML
PROT SERPL-MCNC: 5.8 G/DL (ref 6–8.2)
PROT UR QL STRIP: 100 MG/DL
RBC # BLD AUTO: 3.59 M/UL (ref 4.7–6.1)
RBC # URNS HPF: ABNORMAL /HPF (ref 0–2)
RBC UR QL AUTO: ABNORMAL
RSV RNA SPEC QL NAA+PROBE: NEGATIVE
SARS-COV-2 RNA RESP QL NAA+PROBE: NOTDETECTED
SODIUM SERPL-SCNC: 133 MMOL/L (ref 135–145)
SP GR UR STRIP.AUTO: 1.02
TROPONIN T SERPL-MCNC: 39 NG/L (ref 6–19)
UROBILINOGEN UR STRIP.AUTO-MCNC: 0.2 EU/DL
WBC # BLD AUTO: 5.2 K/UL (ref 4.8–10.8)
WBC #/AREA URNS HPF: ABNORMAL /HPF

## 2025-01-09 PROCEDURE — A9270 NON-COVERED ITEM OR SERVICE: HCPCS | Performed by: EMERGENCY MEDICINE

## 2025-01-09 PROCEDURE — 85025 COMPLETE CBC W/AUTO DIFF WBC: CPT

## 2025-01-09 PROCEDURE — 700102 HCHG RX REV CODE 250 W/ 637 OVERRIDE(OP): Performed by: NURSE PRACTITIONER

## 2025-01-09 PROCEDURE — 87086 URINE CULTURE/COLONY COUNT: CPT

## 2025-01-09 PROCEDURE — 36415 COLL VENOUS BLD VENIPUNCTURE: CPT

## 2025-01-09 PROCEDURE — 71045 X-RAY EXAM CHEST 1 VIEW: CPT

## 2025-01-09 PROCEDURE — 700102 HCHG RX REV CODE 250 W/ 637 OVERRIDE(OP)

## 2025-01-09 PROCEDURE — 83605 ASSAY OF LACTIC ACID: CPT

## 2025-01-09 PROCEDURE — 80053 COMPREHEN METABOLIC PANEL: CPT

## 2025-01-09 PROCEDURE — 96374 THER/PROPH/DIAG INJ IV PUSH: CPT

## 2025-01-09 PROCEDURE — 83880 ASSAY OF NATRIURETIC PEPTIDE: CPT

## 2025-01-09 PROCEDURE — 700111 HCHG RX REV CODE 636 W/ 250 OVERRIDE (IP): Performed by: NURSE PRACTITIONER

## 2025-01-09 PROCEDURE — 82962 GLUCOSE BLOOD TEST: CPT

## 2025-01-09 PROCEDURE — 96372 THER/PROPH/DIAG INJ SC/IM: CPT

## 2025-01-09 PROCEDURE — G0378 HOSPITAL OBSERVATION PER HR: HCPCS

## 2025-01-09 PROCEDURE — 0241U HCHG SARS-COV-2 COVID-19 NFCT DS RESP RNA 4 TRGT ED POC: CPT

## 2025-01-09 PROCEDURE — 700102 HCHG RX REV CODE 250 W/ 637 OVERRIDE(OP): Performed by: EMERGENCY MEDICINE

## 2025-01-09 PROCEDURE — 84145 PROCALCITONIN (PCT): CPT

## 2025-01-09 PROCEDURE — 87077 CULTURE AEROBIC IDENTIFY: CPT

## 2025-01-09 PROCEDURE — 99497 ADVNCD CARE PLAN 30 MIN: CPT | Performed by: INTERNAL MEDICINE

## 2025-01-09 PROCEDURE — 87186 SC STD MICRODIL/AGAR DIL: CPT

## 2025-01-09 PROCEDURE — 700101 HCHG RX REV CODE 250: Performed by: NURSE PRACTITIONER

## 2025-01-09 PROCEDURE — 81001 URINALYSIS AUTO W/SCOPE: CPT

## 2025-01-09 PROCEDURE — 84484 ASSAY OF TROPONIN QUANT: CPT

## 2025-01-09 PROCEDURE — 87040 BLOOD CULTURE FOR BACTERIA: CPT

## 2025-01-09 PROCEDURE — 93005 ELECTROCARDIOGRAM TRACING: CPT | Mod: TC | Performed by: EMERGENCY MEDICINE

## 2025-01-09 PROCEDURE — A9270 NON-COVERED ITEM OR SERVICE: HCPCS | Performed by: NURSE PRACTITIONER

## 2025-01-09 PROCEDURE — 99223 1ST HOSP IP/OBS HIGH 75: CPT | Mod: 25,FS | Performed by: INTERNAL MEDICINE

## 2025-01-09 PROCEDURE — 700111 HCHG RX REV CODE 636 W/ 250 OVERRIDE (IP): Performed by: EMERGENCY MEDICINE

## 2025-01-09 PROCEDURE — 99285 EMERGENCY DEPT VISIT HI MDM: CPT

## 2025-01-09 PROCEDURE — A9270 NON-COVERED ITEM OR SERVICE: HCPCS

## 2025-01-09 RX ORDER — LIDOCAINE 4 G/G
3 PATCH TOPICAL EVERY 24 HOURS
Status: DISCONTINUED | OUTPATIENT
Start: 2025-01-09 | End: 2025-02-07 | Stop reason: HOSPADM

## 2025-01-09 RX ORDER — OXYCODONE HYDROCHLORIDE 5 MG/1
2.5 TABLET ORAL EVERY 6 HOURS PRN
Status: DISCONTINUED | OUTPATIENT
Start: 2025-01-09 | End: 2025-02-07 | Stop reason: HOSPADM

## 2025-01-09 RX ORDER — OXYCODONE HYDROCHLORIDE 5 MG/1
5 TABLET ORAL EVERY 6 HOURS PRN
Status: DISCONTINUED | OUTPATIENT
Start: 2025-01-09 | End: 2025-02-07 | Stop reason: HOSPADM

## 2025-01-09 RX ORDER — ACETAMINOPHEN 325 MG/1
650 TABLET ORAL EVERY 6 HOURS PRN
Status: DISCONTINUED | OUTPATIENT
Start: 2025-01-09 | End: 2025-01-09

## 2025-01-09 RX ORDER — HEPARIN SODIUM 5000 [USP'U]/ML
5000 INJECTION, SOLUTION INTRAVENOUS; SUBCUTANEOUS EVERY 8 HOURS
Status: DISCONTINUED | OUTPATIENT
Start: 2025-01-09 | End: 2025-01-10

## 2025-01-09 RX ORDER — CEFAZOLIN 2 G/1
2 INJECTION, POWDER, FOR SOLUTION INTRAMUSCULAR; INTRAVENOUS ONCE
Status: COMPLETED | OUTPATIENT
Start: 2025-01-09 | End: 2025-01-09

## 2025-01-09 RX ORDER — OXYCODONE HYDROCHLORIDE 5 MG/1
5 TABLET ORAL ONCE
Status: COMPLETED | OUTPATIENT
Start: 2025-01-09 | End: 2025-01-09

## 2025-01-09 RX ORDER — ACETAMINOPHEN 500 MG
1000 TABLET ORAL EVERY 6 HOURS PRN
Status: DISCONTINUED | OUTPATIENT
Start: 2025-01-09 | End: 2025-01-27

## 2025-01-09 RX ORDER — DEXTROSE MONOHYDRATE 25 G/50ML
25 INJECTION, SOLUTION INTRAVENOUS
Status: DISCONTINUED | OUTPATIENT
Start: 2025-01-09 | End: 2025-02-07 | Stop reason: HOSPADM

## 2025-01-09 RX ORDER — OMEPRAZOLE 20 MG/1
40 CAPSULE, DELAYED RELEASE ORAL 2 TIMES DAILY
Status: DISCONTINUED | OUTPATIENT
Start: 2025-01-09 | End: 2025-02-07 | Stop reason: HOSPADM

## 2025-01-09 RX ORDER — AMOXICILLIN 250 MG
2 CAPSULE ORAL NIGHTLY PRN
Status: DISCONTINUED | OUTPATIENT
Start: 2025-01-09 | End: 2025-02-07 | Stop reason: HOSPADM

## 2025-01-09 RX ORDER — POLYETHYLENE GLYCOL 3350 17 G/17G
1 POWDER, FOR SOLUTION ORAL
Status: DISCONTINUED | OUTPATIENT
Start: 2025-01-09 | End: 2025-02-07 | Stop reason: HOSPADM

## 2025-01-09 RX ORDER — GABAPENTIN 100 MG/1
100 CAPSULE ORAL 3 TIMES DAILY
Status: DISCONTINUED | OUTPATIENT
Start: 2025-01-09 | End: 2025-01-10

## 2025-01-09 RX ORDER — INSULIN LISPRO 100 [IU]/ML
1-6 INJECTION, SOLUTION INTRAVENOUS; SUBCUTANEOUS
Status: DISCONTINUED | OUTPATIENT
Start: 2025-01-09 | End: 2025-02-07 | Stop reason: HOSPADM

## 2025-01-09 RX ADMIN — HEPARIN SODIUM 5000 UNITS: 5000 INJECTION, SOLUTION INTRAVENOUS; SUBCUTANEOUS at 22:01

## 2025-01-09 RX ADMIN — GABAPENTIN 100 MG: 100 CAPSULE ORAL at 17:08

## 2025-01-09 RX ADMIN — INSULIN LISPRO 4 UNITS: 100 INJECTION, SOLUTION INTRAVENOUS; SUBCUTANEOUS at 21:59

## 2025-01-09 RX ADMIN — OXYCODONE 5 MG: 5 TABLET ORAL at 13:03

## 2025-01-09 RX ADMIN — CEFAZOLIN 2 G: 2 INJECTION, POWDER, FOR SOLUTION INTRAMUSCULAR; INTRAVENOUS at 15:46

## 2025-01-09 RX ADMIN — OMEPRAZOLE 40 MG: 20 CAPSULE, DELAYED RELEASE ORAL at 17:08

## 2025-01-09 RX ADMIN — INSULIN LISPRO 4 UNITS: 100 INJECTION, SOLUTION INTRAVENOUS; SUBCUTANEOUS at 17:17

## 2025-01-09 RX ADMIN — OXYCODONE 2.5 MG: 5 TABLET ORAL at 22:40

## 2025-01-09 RX ADMIN — ACETAMINOPHEN 650 MG: 325 TABLET ORAL at 16:54

## 2025-01-09 RX ADMIN — LIDOCAINE 3 PATCH: 4 PATCH TOPICAL at 16:55

## 2025-01-09 ASSESSMENT — SOCIAL DETERMINANTS OF HEALTH (SDOH)
WITHIN THE LAST YEAR, HAVE YOU BEEN KICKED, HIT, SLAPPED, OR OTHERWISE PHYSICALLY HURT BY YOUR PARTNER OR EX-PARTNER?: NO
WITHIN THE PAST 12 MONTHS, THE FOOD YOU BOUGHT JUST DIDN'T LAST AND YOU DIDN'T HAVE MONEY TO GET MORE: NEVER TRUE
WITHIN THE LAST YEAR, HAVE TO BEEN RAPED OR FORCED TO HAVE ANY KIND OF SEXUAL ACTIVITY BY YOUR PARTNER OR EX-PARTNER?: NO
WITHIN THE LAST YEAR, HAVE YOU BEEN AFRAID OF YOUR PARTNER OR EX-PARTNER?: NO
IN THE PAST 12 MONTHS, HAS THE ELECTRIC, GAS, OIL, OR WATER COMPANY THREATENED TO SHUT OFF SERVICE IN YOUR HOME?: NO
WITHIN THE LAST YEAR, HAVE YOU BEEN HUMILIATED OR EMOTIONALLY ABUSED IN OTHER WAYS BY YOUR PARTNER OR EX-PARTNER?: NO
WITHIN THE PAST 12 MONTHS, YOU WORRIED THAT YOUR FOOD WOULD RUN OUT BEFORE YOU GOT THE MONEY TO BUY MORE: NEVER TRUE

## 2025-01-09 ASSESSMENT — ENCOUNTER SYMPTOMS
WHEEZING: 0
HEADACHES: 0
ABDOMINAL PAIN: 0
SINUS PAIN: 0
FEVER: 0
VOMITING: 0
PALPITATIONS: 0
MYALGIAS: 1
ORTHOPNEA: 0
BACK PAIN: 1
FLANK PAIN: 0
DIARRHEA: 1
NAUSEA: 0
NERVOUS/ANXIOUS: 0
SORE THROAT: 0
TINGLING: 1
SHORTNESS OF BREATH: 0
DEPRESSION: 1
CHILLS: 0
NECK PAIN: 1
DIAPHORESIS: 0
HEARTBURN: 0
DIZZINESS: 0
CONSTIPATION: 0
COUGH: 1
WEIGHT LOSS: 1

## 2025-01-09 ASSESSMENT — COGNITIVE AND FUNCTIONAL STATUS - GENERAL
DRESSING REGULAR UPPER BODY CLOTHING: A LITTLE
MOBILITY SCORE: 16
WALKING IN HOSPITAL ROOM: A LOT
PERSONAL GROOMING: A LITTLE
MOVING TO AND FROM BED TO CHAIR: A LITTLE
CLIMB 3 TO 5 STEPS WITH RAILING: A LOT
MOVING FROM LYING ON BACK TO SITTING ON SIDE OF FLAT BED: A LITTLE
DRESSING REGULAR LOWER BODY CLOTHING: A LOT
DAILY ACTIVITIY SCORE: 16
STANDING UP FROM CHAIR USING ARMS: A LITTLE
SUGGESTED CMS G CODE MODIFIER MOBILITY: CK
TOILETING: A LOT
TURNING FROM BACK TO SIDE WHILE IN FLAT BAD: A LITTLE
HELP NEEDED FOR BATHING: A LOT
SUGGESTED CMS G CODE MODIFIER DAILY ACTIVITY: CK

## 2025-01-09 ASSESSMENT — LIFESTYLE VARIABLES
HAVE PEOPLE ANNOYED YOU BY CRITICIZING YOUR DRINKING: NO
EVER FELT BAD OR GUILTY ABOUT YOUR DRINKING: NO
TOTAL SCORE: 0
HOW MANY TIMES IN THE PAST YEAR HAVE YOU HAD 5 OR MORE DRINKS IN A DAY: 0
TOTAL SCORE: 0
TOTAL SCORE: 0
HAVE YOU EVER FELT YOU SHOULD CUT DOWN ON YOUR DRINKING: NO
EVER HAD A DRINK FIRST THING IN THE MORNING TO STEADY YOUR NERVES TO GET RID OF A HANGOVER: NO
AVERAGE NUMBER OF DAYS PER WEEK YOU HAVE A DRINK CONTAINING ALCOHOL: 0
ON A TYPICAL DAY WHEN YOU DRINK ALCOHOL HOW MANY DRINKS DO YOU HAVE: 0
ALCOHOL_USE: NO
CONSUMPTION TOTAL: NEGATIVE

## 2025-01-09 ASSESSMENT — PAIN DESCRIPTION - PAIN TYPE
TYPE: ACUTE PAIN;CHRONIC PAIN
TYPE: ACUTE PAIN;CHRONIC PAIN
TYPE: ACUTE PAIN
TYPE: ACUTE PAIN

## 2025-01-09 ASSESSMENT — FIBROSIS 4 INDEX: FIB4 SCORE: 1.26

## 2025-01-09 NOTE — ED NOTES
Pt medicated for pain per MAR. Pt provided urinal for sample but states he cannot provide one at this time.

## 2025-01-09 NOTE — ED NOTES
Pharmacy Medication Reconciliation      ~Medication reconciliation updated and complete per patient   ~Allergies have been verified and updated   ~No oral ABX within the last 30 days  ~Patient home pharmacy :  [facility] [phone]      ~Anticoagulants (rivaroxaban, apixaban, edoxaban, dabigatran, warfarin, enoxaparin) taken in the last 14 days? no

## 2025-01-09 NOTE — ED PROVIDER NOTES
ER Provider Note    Scribed for Anthony Pozo M.D. by Lauren Arredondo. 1/9/2025   12:11 PM    Primary Care Provider: DIANA Tariq    CHIEF COMPLAINT  Chief Complaint   Patient presents with    Weakness     Increasing weakness last week, decreased dexterity with upper extremities baseline, thoracic back pain and worsening last x4 days, prior back surgeries, Rx Gabapentin    High Blood Sugar     With , has not been able to give self insulin last x4 days, at ER POC glucose 382     EXTERNAL RECORDS REVIEWED  Inpatient Notes Records review show that the patient was admitted a month ago for abdominal pain and elevated blood sugar. He had a EGD done that showed corrosive esophagitis and hiatal hernia. He has a history of chronic bilateral motor neuropathy.     HPI/ROS  LIMITATION TO HISTORY   Select: : None  OUTSIDE HISTORIAN(S):  None    Christoph Taylor is a 59 y.o. male who presents to the ED via EMS for evaluation of weakness onset a week ago. The patient states he also has weakness in his legs, cough, and diarrhea for the past month, but denies any fever, runny nose, chest pain, difficulty breathing, nausea or vomiting. He describes that his pain is located in the middle of his back. Denies any radiation. He also is noting that he has been having difficulty gripping things in bilateral hands, which has made giving himself insulin difficult. He notes that his last dose of insulin was about 4 days ago. He also reports that he has had some blood in his urine, but denies any difficulty urinating. The patient states that his pain has been typical, but has been worsening, which is why he reports to the ED today. Denies any recent falls, injuries, or trauma. He notes that he is on gabapentin for his pain. Denies normally being on oxygen. The patient reports that he lives alone and does not normally get around, however when he does he uses a wheelchair. The patient has an allergy to ketamine.      PAST  MEDICAL HISTORY  Past Medical History:   Diagnosis Date    Abnormal electrocardiogram (ECG) (EKG) 11/08/2021    Acute cystitis without hematuria 01/26/2024    8/10 positive urinalysis, with associated hyperglycemia, will start antibiotics, follow-up cultures, initial culture with UA on August 8 was negative      Acute esophagitis 07/28/2022    Acute hypoxic respiratory failure (HCC) 07/22/2024    Acute metabolic encephalopathy 06/09/2023    Acute on chronic anemia 05/06/2024    Acute on chronic respiratory failure (HCC) 05/20/2024    Acute respiratory failure with hypoxia (HCC) 07/25/2024    Acute superficial gastritis without hemorrhage 05/21/2024    Agitation 06/09/2024    KRISTAL (acute kidney injury) (HCC) 11/08/2021    AP (abdominal pain) 06/08/2023    Chest pain in adult 11/08/2021    Chronic renal insufficiency 06/04/2024    CKD (chronic kidney disease) stage 3, GFR 30-59 ml/min 11/07/2021    Diabetes ([facility])     Diabetic ketoacidosis without coma ([facility]) 11/07/2021    Diabetic ketoacidosis without coma associated with type 2 diabetes mellitus ([facility]) 11/07/2021    Diarrhea 03/18/2022    DKA, type 1, not at goal ([facility]) 07/28/2022    Elevated troponin 06/10/2023    Esophagitis 07/28/2022    [place] (hard of hearing)     Very [place] No hearing aides    Hypercholesteremia     Impaction of intestine ([facility]) 05/21/2024    Intractable left upper quadrant abdominal pain 05/21/2024    Leukocytosis 05/20/2024    Metabolic acidosis, increased anion gap 11/07/2021    Syncope 06/09/2023    Transaminitis 06/12/2024     SURGICAL HISTORY  Past Surgical History:   Procedure Laterality Date    MD UPPER GI ENDOSCOPY,DIAGNOSIS N/A 12/9/2024    Procedure: GASTROSCOPY WITH BIOPSY;  Surgeon: Radha Ivan M.D.;  Location: SURGERY SAME DAY [facility];  Service: Gastroenterology    FUSION, SPINE, LUMBAR, PLIF N/A 6/7/2024    Procedure: FUSION, SPINE, LUMBAR, PLIF- REDO LUMBAR 4-5 LAMINECTOMY WITH L4-5 STEALTH FUSION;  Surgeon: Moo Pulido III,  M.D.;  Location: SURGERY [facility];  Service: Neurosurgery    LUMBAR LAMINECTOMY DISKECTOMY N/A 6/7/2024    Procedure: LAMINECTOMY, SPINE, LUMBAR, WITH DISCECTOMY;  Surgeon: Moo Pulido III, M.D.;  Location: SURGERY [facility];  Service: Neurosurgery    CERVICAL DISK AND FUSION ANTERIOR N/A 6/4/2024    Procedure: C4-C7 ANTERIOR CERVICAL DISC AND FUSION;  Surgeon: Moo Pulido III, M.D.;  Location: SURGERY [facility];  Service: Neurosurgery    FINGER OR HAND INCISION AND DRAINAGE Right 6/12/2023    Procedure: INCISION AND DRAINAGE, HAND - THUMB;  Surgeon: Ace Shaw M.D.;  Location: SURGERY [facility];  Service: Orthopedics    GA UPPER GI ENDOSCOPY,DIAGNOSIS N/A 3/14/2023    Procedure: GASTROSCOPY;  Surgeon: Atilio Freed M.D.;  Location: SURGERY [facility];  Service: Gastroenterology    OTHER      appy, lumbar fusion     FAMILY HISTORY  Family History   Problem Relation Age of Onset    Heart Disease Father      SOCIAL HISTORY   reports that he has never smoked. He has never used smokeless tobacco. He reports current alcohol use. He reports that he does not currently use drugs.    CURRENT MEDICATIONS  Previous Medications    ACETAMINOPHEN (TYLENOL) 325 MG TAB    Take 2 Tablets by mouth every 6 hours as needed for Mild Pain.    ALCOHOL SWABS    Wipe site with prep pad prior to injection.    BLOOD GLUCOSE MONITORING SUPPL (BLOOD GLUCOSE MONITOR SYSTEM) W/DEVICE KIT    Test blood sugar as recommended by provider.    CONTINUOUS GLUCOSE  (FREESTYLE VALERIE 14 DAY READER) DEVICE    To use with freestyle valerie sensor    CONTINUOUS GLUCOSE SENSOR (FREESTYLE VALERIE 14 DAY SENSOR) MIS    1 Each every 14 days.    ENOXAPARIN (LOVENOX) 40 MG/0.4ML SOLUTION PREFILLED SYRINGE INJ    Inject 40 mg under the skin every day at 6 PM.    FLUCONAZOLE (DIFLUCAN) 100 MG TAB    Take 1 Tablet by mouth every day for 21 days.    GABAPENTIN (NEURONTIN) 100 MG CAP    Take 1 Capsule by mouth 3 times a day.    GLUCOSE  BLOOD (ONETOUCH ULTRA) STRIP    Use 1 Strip for Blood glucose checks    GUAIFENESIN DEXTROMETHORPHAN (ROBITUSSIN DM) 100-10 MG/5ML SYRUP SYRUP    Take 10 mL by mouth every 6 hours as needed for Cough.    HYOSCYAMINE SULFATE (GI COCKTAIL, HYOSCYAMINE-LIDOCAINE-MAALOX,)    Take 30 mL by mouth every 6 hours as needed (for severe abdominal pain).    INSULIN ASPART (NOVOLOG FLEXPEN) 100 UNIT/ML INJECTION PEN    Inject 3-14 Units under the skin 3 times a day before meals. For glucose:  70 - 150 mg/dL = 0 Units  151 - 200 mg/dL = 3 Units  201 - 250 mg/dL = 4 Units  251 - 300 mg/dL = 7 Units  301 - 350 mg/dL = 10 Units  351 - 400 mg/dL = 12 Units  Over 400 mg/dL  = 14 Units    INSULIN GLARGINE (LANTUS SOLOSTAR) 100 UNIT/ML SOLUTION PEN-INJECTOR INJECTION    Inject 20 Units under the skin every evening.    INSULIN PEN NEEDLE 32 G X 4 MM    Use one pen needle in pen device to inject insulin four times daily.    LANCETS    Use one lancet to test blood sugar three times daily before meals.    LIDOCAINE (ASPERFLEX) 4 % PATCH    Place 3 Patches on the skin every 24 hours.    MELATONIN 5 MG TAB    Take 1 Tablet by mouth every evening.    METOCLOPRAMIDE (REGLAN) 5 MG TABLET    Take 1 Tablet by mouth 3 times a day before meals.    OMEPRAZOLE (PRILOSEC) 40 MG DELAYED-RELEASE CAPSULE    Take 1 Capsule by mouth 2 times a day.    ONDANSETRON (ZOFRAN ODT) 4 MG TABLET DISPERSIBLE    Take 1 Tablet by mouth every four hours as needed for Nausea/Vomiting (give PO if no IV route available).    ONDANSETRON (ZOFRAN) 4 MG/2ML SOLUTION INJECTION    Infuse 2 mL into a venous catheter every four hours as needed for Nausea.    POLYETHYLENE GLYCOL/LYTES (MIRALAX) PACK    Take 1 Packet by mouth 1 time a day as needed (if no bowel movement in last 2 days).    SENNA-DOCUSATE (PERICOLACE OR SENOKOT S) 8.6-50 MG TAB    Take 2 Tablets by mouth every evening.    SUCRALFATE (CARAFATE) 1 GM/10ML SUSPENSION    Take 10 mL by mouth 4 Times a Day,Before Meals and  at Bedtime.    TAMSULOSIN (FLOMAX) 0.4 MG CAPSULE    Take 1 Capsule by mouth 1/2 hour after breakfast.    VENLAFAXINE (EFFEXOR) 37.5 MG TAB    Take 1 Tablet by mouth every day.     ALLERGIES  Allergies   Allergen Reactions    Ketamine Unspecified     aggressive      PHYSICAL EXAM  BP (!) 176/99   Pulse 89   Temp 36.6 °C (97.9 °F) (Temporal)   Resp 18   Ht 1.829 m (6')   Wt 77.1 kg (170 lb)   SpO2 99%   BMI 23.06 kg/m²      Constitutional: Well developed, Well nourished, no acute distress  HENT: Normocephalic, Atraumatic, Dry mucous membranes  Eyes: PERRLA, EOMI, Conjunctiva normal, No discharge.   Neck: No tenderness, Supple, No stridor.   Cardiovascular: Normal heart rate, Normal rhythm.   Thorax & Lungs: Clear to auscultation bilaterally, No respiratory distress.   Abdomen: Soft, No tenderness, No masses.  Back: Tenderness throughout back   Skin: Warm, Dry, No rash.    Musculoskeletal: No major deformities noted.  Neurologic: Awake, alert, Weak bilateral upper extremity that is chronic, 4/5 muscle strength in lower extremities,  Psychiatric: Affect normal, Judgment normal, Mood normal.      DIAGNOSTIC STUDIES  Labs:   Results for orders placed or performed during the hospital encounter of 01/09/25   POCT glucose device results    Collection Time: 01/09/25 11:44 AM   Result Value Ref Range    POC Glucose, Blood 382 (H) 65 - 99 mg/dL   Lactic Acid    Collection Time: 01/09/25 11:50 AM   Result Value Ref Range    Lactic Acid 1.0 0.5 - 2.0 mmol/L   CBC with Differential    Collection Time: 01/09/25 11:50 AM   Result Value Ref Range    WBC 5.2 4.8 - 10.8 K/uL    RBC 3.59 (L) 4.70 - 6.10 M/uL    Hemoglobin 9.7 (L) 14.0 - 18.0 g/dL    Hematocrit 29.2 (L) 42.0 - 52.0 %    MCV 81.3 (L) 81.4 - 97.8 fL    MCH 27.0 27.0 - 33.0 pg    MCHC 33.2 32.3 - 36.5 g/dL    RDW 41.1 35.9 - 50.0 fL    Platelet Count 204 164 - 446 K/uL    MPV 9.1 9.0 - 12.9 fL    Neutrophils-Polys 76.10 (H) 44.00 - 72.00 %    Lymphocytes 13.00 (L)  22.00 - 41.00 %    Monocytes 8.60 0.00 - 13.40 %    Eosinophils 1.90 0.00 - 6.90 %    Basophils 0.20 0.00 - 1.80 %    Immature Granulocytes 0.20 0.00 - 0.90 %    Nucleated RBC 0.00 0.00 - 0.20 /100 WBC    Neutrophils (Absolute) 3.97 1.82 - 7.42 K/uL    Lymphs (Absolute) 0.68 (L) 1.00 - 4.80 K/uL    Monos (Absolute) 0.45 0.00 - 0.85 K/uL    Eos (Absolute) 0.10 0.00 - 0.51 K/uL    Baso (Absolute) 0.01 0.00 - 0.12 K/uL    Immature Granulocytes (abs) 0.01 0.00 - 0.11 K/uL    NRBC (Absolute) 0.00 K/uL   Complete Metabolic Panel    Collection Time: 01/09/25 11:50 AM   Result Value Ref Range    Sodium 133 (L) 135 - 145 mmol/L    Potassium 4.5 3.6 - 5.5 mmol/L    Chloride 95 (L) 96 - 112 mmol/L    Co2 30 20 - 33 mmol/L    Anion Gap 8.0 7.0 - 16.0    Glucose 429 (H) 65 - 99 mg/dL    Bun 17 8 - 22 mg/dL    Creatinine 0.65 0.50 - 1.40 mg/dL    Calcium 8.6 8.5 - 10.5 mg/dL    Correct Calcium 9.2 8.5 - 10.5 mg/dL    AST(SGOT) 14 12 - 45 U/L    ALT(SGPT) 21 2 - 50 U/L    Alkaline Phosphatase 84 30 - 99 U/L    Total Bilirubin 0.3 0.1 - 1.5 mg/dL    Albumin 3.2 3.2 - 4.9 g/dL    Total Protein 5.8 (L) 6.0 - 8.2 g/dL    Globulin 2.6 1.9 - 3.5 g/dL    A-G Ratio 1.2 g/dL   Troponin    Collection Time: 01/09/25 11:50 AM   Result Value Ref Range    Troponin T 39 (H) 6 - 19 ng/L   proBrain Natriuretic Peptide, NT    Collection Time: 01/09/25 11:50 AM   Result Value Ref Range    NT-proBNP 1680 (H) 0 - 125 pg/mL   ESTIMATED GFR    Collection Time: 01/09/25 11:50 AM   Result Value Ref Range    GFR (CKD-EPI) 107 >60 mL/min/1.73 m 2   Blood Culture - Draw one from central line and one from peripheral site    Collection Time: 01/09/25 12:29 PM    Specimen: Line; Blood   Result Value Ref Range    Significant Indicator NEG     Source BLD     Site Peripheral     Culture Result       No Growth  Note: Blood cultures are incubated for 5 days and  are monitored continuously.Positive blood cultures  are called to the RN and reported as soon as  they  are identified.     PROCALCITONIN    Collection Time: 25 12:29 PM   Result Value Ref Range    Procalcitonin <0.05 <0.25 ng/mL   Blood Culture - Draw one from central line and one from peripheral site    Collection Time: 25 12:45 PM    Specimen: Peripheral; Blood   Result Value Ref Range    Significant Indicator NEG     Source BLD     Site PERIPHERAL     Culture Result       No Growth  Note: Blood cultures are incubated for 5 days and  are monitored continuously.Positive blood cultures  are called to the RN and reported as soon as  they are identified.     POC CoV-2, FLU A/B, RSV by PCR    Collection Time: 25 12:47 PM   Result Value Ref Range    POC Influenza A RNA, PCR Negative Negative    POC Influenza B RNA, PCR Negative Negative    POC RSV, by PCR Negative Negative    POC SARS-CoV-2, PCR NotDetected NotDetected   Urinalysis    Collection Time: 25  1:50 PM    Specimen: Urine   Result Value Ref Range    Color Yellow     Character Clear     Specific Gravity 1.021 <1.035    Ph 7.5 5.0 - 8.0    Glucose >=1000 (A) Negative mg/dL    Ketones Trace (A) Negative mg/dL    Protein 100 (A) Negative mg/dL    Bilirubin Negative Negative    Urobilinogen, Urine 0.2 <=1.0 EU/dL    Nitrite Negative Negative    Leukocyte Esterase Negative Negative    Occult Blood Trace (A) Negative    Micro Urine Req Microscopic    URINE MICROSCOPIC (W/UA)    Collection Time: 25  1:50 PM   Result Value Ref Range    WBC 0-2 /hpf    RBC 3-5 (A) 0 - 2 /hpf    Bacteria Moderate (A) None /hpf    Epithelial Cells 0-2 0 - 5 /hpf    Urine Casts 0-2 0 - 2 /lpf   EKG    Collection Time: 25  3:06 PM   Result Value Ref Range    Report       [facility] Emergency Dept.    Test Date:  2025  Pt Name:    BAILEE JACOBSON               Department: ER  MRN:        [id number]                      Room:        03  Gender:     Male                         Technician: [id number]  :        [id number]                    Requested By:JORGE GARCIA  Order #:    [id number]                    Reading MD: JORGE GARCIA MD    Measurements  Intervals                                Axis  Rate:       88                           P:          74  MT:         183                          QRS:        67  QRSD:       92                           T:          60  QT:         369  QTc:        447    Interpretive Statements  Sinus rhythm  Baseline wander in lead(s) V4  Compared to ECG 12/06/2024 20:59:02  No significant changes  Electronically Signed On 01- 15:06:29 PST by JORGE GARCIA MD       Radiology:   This attending emergency physician has independently interpreted the diagnostic imaging associated with this visit and is awaiting the final reading from the radiologist.   Preliminary interpretation is a follows: No pneumonia  Radiologist interpretation:   DX-CHEST-PORTABLE (1 VIEW)   Final Result      No acute cardiopulmonary disease evident.         COURSE & MEDICAL DECISION MAKING     INITIAL ASSESSMENT, COURSE AND PLAN  Differential diagnoses include but not limited to: metabolic, sepsis, acute on chronic pain     Care Narrative: Patient recently just discharged from the skilled nursing facility, is having difficulty ambulating secondary to longstanding thoracic back pain also has some lower leg pain.  The patient has not been taking his insulin, has been having difficulty ambulating.  No fevers.  Workup here shows the patient has hyperglycemia without any evidence of DKA.  Patient was given a dose of oxycodone for his pain.  The patient does have a urinary tract infection, give the patient antibiotics.  The patient will need to be admitted to the hospital, discussed the case with the hospitalist APRN for admission to hospital.      12:18 PM - Patient was evaluated at bedside. Discussed the plan of care, including ordering imaging, labs and EKG to further evaluate. Also discussed the plan for admission. Ordered for  DX-chest, Lactic acid, CBC with diff, CMP, UA, Urine culture, Blood culture, Blood culture, SARS-CoV-2, Flu A/B, and RSV, Troponin, BNP, EKG to evaluate. Patient verbalizes understanding and support with my plan of care.     12:54 PM - Patient was noted to be in pain and is requesting pain medication. He will be medicated with Roxicodone tablet 5 mg for his symptoms.     1:50 PM - Ordered Urine microscopic to further evaluate.     3:56 PM - I discussed the patient's case and the above findings with Dr. Dhaliwal (Hospitalist) who agrees to evaluate the patient for admission     DISPOSITION AND DISCUSSIONS    I have discussed management of the patient with the following physicians and MICHELLE's:  Dr. Dhaliwal (Hospitalist)     DISPOSITION:  Patient will be hospitalized by Dr. Dhaliwal in guarded condition.    FINAL DIAGNOSIS  1. Weakness    2. Hyperglycemia    3. Acute cystitis without hematuria         ILauren (Scribe), am scribing for, and in the presence of, Anthony Pozo M.D..    Electronically signed by: Lauren Arredondo (Scribe), 1/9/2025    IAnthony M.D. personally performed the services described in this documentation, as scribed by Lauren Arredondo in my presence, and it is both accurate and complete.      The note accurately reflects work and decisions made by me.  Anthony Pozo M.D.  1/9/2025  5:09 PM

## 2025-01-09 NOTE — ED TRIAGE NOTES
Chief Complaint   Patient presents with    Weakness     Increasing weakness last week, decreased dexterity with upper extremities baseline, thoracic back pain and worsening last x4 days, prior back surgeries, Rx Gabapentin    High Blood Sugar     With , has not been able to give self insulin last x4 days, at ER POC glucose 382        BIB [facility] for above complaint. Given 400ml NS and 100mcg fentanyl with EMS. Placed on 2L O2 with EMS for low O2.    Labs drawn. Placed on 2L O2 d/t low RA sats      BP (!) 166/80   Pulse 84   Temp 36.6 °C (97.9 °F) (Temporal)   Resp 15   Ht 1.829 m (6')   Wt 77.1 kg (170 lb)   SpO2 97%   BMI 23.06 kg/m²

## 2025-01-10 PROBLEM — J96.01 ACUTE RESPIRATORY FAILURE WITH HYPOXIA (HCC): Status: ACTIVE | Noted: 2025-01-10

## 2025-01-10 LAB
25(OH)D3 SERPL-MCNC: 11 NG/ML (ref 30–100)
ANION GAP SERPL CALC-SCNC: 7 MMOL/L (ref 7–16)
BASOPHILS # BLD AUTO: 0.2 % (ref 0–1.8)
BASOPHILS # BLD: 0.01 K/UL (ref 0–0.12)
BUN SERPL-MCNC: 18 MG/DL (ref 8–22)
CALCIUM SERPL-MCNC: 8.6 MG/DL (ref 8.5–10.5)
CHLORIDE SERPL-SCNC: 97 MMOL/L (ref 96–112)
CO2 SERPL-SCNC: 32 MMOL/L (ref 20–33)
CREAT SERPL-MCNC: 0.86 MG/DL (ref 0.5–1.4)
EOSINOPHIL # BLD AUTO: 0.2 K/UL (ref 0–0.51)
EOSINOPHIL NFR BLD: 3.9 % (ref 0–6.9)
ERYTHROCYTE [DISTWIDTH] IN BLOOD BY AUTOMATED COUNT: 41.5 FL (ref 35.9–50)
EST. AVERAGE GLUCOSE BLD GHB EST-MCNC: 235 MG/DL
GFR SERPLBLD CREATININE-BSD FMLA CKD-EPI: 99 ML/MIN/1.73 M 2
GLUCOSE BLD STRIP.AUTO-MCNC: 234 MG/DL (ref 65–99)
GLUCOSE BLD STRIP.AUTO-MCNC: 259 MG/DL (ref 65–99)
GLUCOSE BLD STRIP.AUTO-MCNC: 295 MG/DL (ref 65–99)
GLUCOSE BLD STRIP.AUTO-MCNC: 299 MG/DL (ref 65–99)
GLUCOSE SERPL-MCNC: 220 MG/DL (ref 65–99)
HBA1C MFR BLD: 9.8 % (ref 4–5.6)
HCT VFR BLD AUTO: 30.2 % (ref 42–52)
HGB BLD-MCNC: 9.8 G/DL (ref 14–18)
IMM GRANULOCYTES # BLD AUTO: 0.02 K/UL (ref 0–0.11)
IMM GRANULOCYTES NFR BLD AUTO: 0.4 % (ref 0–0.9)
LYMPHOCYTES # BLD AUTO: 1.05 K/UL (ref 1–4.8)
LYMPHOCYTES NFR BLD: 20.5 % (ref 22–41)
MAGNESIUM SERPL-MCNC: 1.7 MG/DL (ref 1.5–2.5)
MCH RBC QN AUTO: 26.3 PG (ref 27–33)
MCHC RBC AUTO-ENTMCNC: 32.5 G/DL (ref 32.3–36.5)
MCV RBC AUTO: 81 FL (ref 81.4–97.8)
MONOCYTES # BLD AUTO: 0.5 K/UL (ref 0–0.85)
MONOCYTES NFR BLD AUTO: 9.8 % (ref 0–13.4)
NEUTROPHILS # BLD AUTO: 3.33 K/UL (ref 1.82–7.42)
NEUTROPHILS NFR BLD: 65.2 % (ref 44–72)
NRBC # BLD AUTO: 0 K/UL
NRBC BLD-RTO: 0 /100 WBC (ref 0–0.2)
PLATELET # BLD AUTO: 208 K/UL (ref 164–446)
PMV BLD AUTO: 8.9 FL (ref 9–12.9)
POTASSIUM SERPL-SCNC: 3.8 MMOL/L (ref 3.6–5.5)
RBC # BLD AUTO: 3.73 M/UL (ref 4.7–6.1)
SODIUM SERPL-SCNC: 136 MMOL/L (ref 135–145)
VIT B12 SERPL-MCNC: 995 PG/ML (ref 211–911)
WBC # BLD AUTO: 5.1 K/UL (ref 4.8–10.8)

## 2025-01-10 PROCEDURE — 82607 VITAMIN B-12: CPT

## 2025-01-10 PROCEDURE — 83735 ASSAY OF MAGNESIUM: CPT

## 2025-01-10 PROCEDURE — 770006 HCHG ROOM/CARE - MED/SURG/GYN SEMI*

## 2025-01-10 PROCEDURE — 36415 COLL VENOUS BLD VENIPUNCTURE: CPT

## 2025-01-10 PROCEDURE — 85025 COMPLETE CBC W/AUTO DIFF WBC: CPT

## 2025-01-10 PROCEDURE — 700102 HCHG RX REV CODE 250 W/ 637 OVERRIDE(OP): Performed by: INTERNAL MEDICINE

## 2025-01-10 PROCEDURE — 97597 DBRDMT OPN WND 1ST 20 CM/<: CPT

## 2025-01-10 PROCEDURE — 700101 HCHG RX REV CODE 250: Performed by: NURSE PRACTITIONER

## 2025-01-10 PROCEDURE — 700111 HCHG RX REV CODE 636 W/ 250 OVERRIDE (IP): Performed by: NURSE PRACTITIONER

## 2025-01-10 PROCEDURE — 82306 VITAMIN D 25 HYDROXY: CPT

## 2025-01-10 PROCEDURE — 82962 GLUCOSE BLOOD TEST: CPT

## 2025-01-10 PROCEDURE — A9270 NON-COVERED ITEM OR SERVICE: HCPCS | Performed by: INTERNAL MEDICINE

## 2025-01-10 PROCEDURE — 99232 SBSQ HOSP IP/OBS MODERATE 35: CPT | Performed by: INTERNAL MEDICINE

## 2025-01-10 PROCEDURE — A9270 NON-COVERED ITEM OR SERVICE: HCPCS | Performed by: NURSE PRACTITIONER

## 2025-01-10 PROCEDURE — 700102 HCHG RX REV CODE 250 W/ 637 OVERRIDE(OP)

## 2025-01-10 PROCEDURE — A9270 NON-COVERED ITEM OR SERVICE: HCPCS

## 2025-01-10 PROCEDURE — 97162 PT EVAL MOD COMPLEX 30 MIN: CPT

## 2025-01-10 PROCEDURE — 83036 HEMOGLOBIN GLYCOSYLATED A1C: CPT

## 2025-01-10 PROCEDURE — 96372 THER/PROPH/DIAG INJ SC/IM: CPT

## 2025-01-10 PROCEDURE — 700102 HCHG RX REV CODE 250 W/ 637 OVERRIDE(OP): Performed by: NURSE PRACTITIONER

## 2025-01-10 PROCEDURE — 80048 BASIC METABOLIC PNL TOTAL CA: CPT

## 2025-01-10 RX ORDER — ENOXAPARIN SODIUM 100 MG/ML
40 INJECTION SUBCUTANEOUS DAILY
Status: DISCONTINUED | OUTPATIENT
Start: 2025-01-10 | End: 2025-02-07 | Stop reason: HOSPADM

## 2025-01-10 RX ORDER — LORAZEPAM 2 MG/ML
0.5 INJECTION INTRAMUSCULAR ONCE
Status: ACTIVE | OUTPATIENT
Start: 2025-01-10 | End: 2025-01-11

## 2025-01-10 RX ORDER — VENLAFAXINE 75 MG/1
37.5 TABLET ORAL DAILY
Status: DISCONTINUED | OUTPATIENT
Start: 2025-01-10 | End: 2025-01-20

## 2025-01-10 RX ORDER — GABAPENTIN 100 MG/1
200 CAPSULE ORAL 3 TIMES DAILY
Status: DISCONTINUED | OUTPATIENT
Start: 2025-01-10 | End: 2025-02-07 | Stop reason: HOSPADM

## 2025-01-10 RX ADMIN — HEPARIN SODIUM 5000 UNITS: 5000 INJECTION, SOLUTION INTRAVENOUS; SUBCUTANEOUS at 14:31

## 2025-01-10 RX ADMIN — INSULIN LISPRO 3 UNITS: 100 INJECTION, SOLUTION INTRAVENOUS; SUBCUTANEOUS at 12:46

## 2025-01-10 RX ADMIN — OMEPRAZOLE 40 MG: 20 CAPSULE, DELAYED RELEASE ORAL at 16:59

## 2025-01-10 RX ADMIN — GABAPENTIN 200 MG: 100 CAPSULE ORAL at 12:44

## 2025-01-10 RX ADMIN — TIZANIDINE 2 MG: 4 TABLET ORAL at 03:00

## 2025-01-10 RX ADMIN — GABAPENTIN 200 MG: 100 CAPSULE ORAL at 16:58

## 2025-01-10 RX ADMIN — OXYCODONE 5 MG: 5 TABLET ORAL at 07:58

## 2025-01-10 RX ADMIN — INSULIN LISPRO 2 UNITS: 100 INJECTION, SOLUTION INTRAVENOUS; SUBCUTANEOUS at 07:53

## 2025-01-10 RX ADMIN — OMEPRAZOLE 40 MG: 20 CAPSULE, DELAYED RELEASE ORAL at 05:08

## 2025-01-10 RX ADMIN — OXYCODONE 5 MG: 5 TABLET ORAL at 14:30

## 2025-01-10 RX ADMIN — TIZANIDINE 2 MG: 4 TABLET ORAL at 10:08

## 2025-01-10 RX ADMIN — INSULIN LISPRO 3 UNITS: 100 INJECTION, SOLUTION INTRAVENOUS; SUBCUTANEOUS at 16:54

## 2025-01-10 RX ADMIN — LIDOCAINE 3 PATCH: 4 PATCH TOPICAL at 16:31

## 2025-01-10 RX ADMIN — INSULIN LISPRO 3 UNITS: 100 INJECTION, SOLUTION INTRAVENOUS; SUBCUTANEOUS at 21:58

## 2025-01-10 RX ADMIN — INSULIN GLARGINE-YFGN 20 UNITS: 100 INJECTION, SOLUTION SUBCUTANEOUS at 16:54

## 2025-01-10 RX ADMIN — ACETAMINOPHEN 1000 MG: 500 TABLET ORAL at 12:42

## 2025-01-10 RX ADMIN — VENLAFAXINE 37.5 MG: 75 TABLET ORAL at 09:57

## 2025-01-10 RX ADMIN — HEPARIN SODIUM 5000 UNITS: 5000 INJECTION, SOLUTION INTRAVENOUS; SUBCUTANEOUS at 05:08

## 2025-01-10 RX ADMIN — OXYCODONE 5 MG: 5 TABLET ORAL at 21:51

## 2025-01-10 RX ADMIN — GABAPENTIN 100 MG: 100 CAPSULE ORAL at 05:07

## 2025-01-10 RX ADMIN — TIZANIDINE 2 MG: 4 TABLET ORAL at 19:36

## 2025-01-10 ASSESSMENT — ENCOUNTER SYMPTOMS
CHILLS: 0
MYALGIAS: 1
VOMITING: 0
WEAKNESS: 1
NAUSEA: 0
ABDOMINAL PAIN: 0
FEVER: 0

## 2025-01-10 ASSESSMENT — PAIN DESCRIPTION - PAIN TYPE
TYPE: CHRONIC PAIN
TYPE: CHRONIC PAIN
TYPE: CHRONIC PAIN;ACUTE PAIN
TYPE: CHRONIC PAIN
TYPE: CHRONIC PAIN;ACUTE PAIN
TYPE: ACUTE PAIN

## 2025-01-10 ASSESSMENT — FIBROSIS 4 INDEX: FIB4 SCORE: 0.88

## 2025-01-10 NOTE — PROGRESS NOTES
4 Eyes Skin Assessment Completed by Mikala RN and PENNIE Perdomo.    Head WDL  Ears WDL  Nose WDL  Mouth WDL  Neck WDL  Breast/Chest WDL  Shoulder Blades WDL  Spine WDL  (R) Arm/Elbow/Hand Scab  (L) Arm/Elbow/Hand Scab  Abdomen WDL  Groin WDL  Scrotum/Coccyx/Buttocks WDL  (R) Leg Redness and Scab  (L) Leg Redness and Scab  (R) Heel/Foot/Toe Scab  (L) Heel/Foot/Toe Scab    Devices In Places Pulse Ox      Interventions In Place Pillows and Barrier Cream    Possible Skin Injury Yes    Pictures Uploaded Into Epic Yes  Wound Consult Placed Yes  RN Wound Prevention Protocol Ordered Yes

## 2025-01-10 NOTE — H&P
Hospital Medicine History & Physical Note    Date of Service  1/9/2025    Primary Care Physician  YESY Tariq.      Code Status  Full Code    Chief Complaint  Chief Complaint   Patient presents with    Weakness     Increasing weakness last week, decreased dexterity with upper extremities baseline, thoracic back pain and worsening last x4 days, prior back surgeries, Rx Gabapentin    High Blood Sugar     With , has not been able to give self insulin last x4 days, at ER POC glucose 382       History of Presenting Illness  Christoph Taylor is a 59 y.o. male with a pmh of uncontrolled DM type 2, CKD, WILLIAM, chronic low back and pain with motor neuropathy of upper B/L limbs, depression and history of erosive esophagitis and history of SI who presented 1/9/2025 with complaints of a 1 week history of progressive weakness. The patient was discharged from skilled nursing facility about two weeks ago. He reports struggling at home with ADLs as he is unable to manipulate anything with his hands due to his chronic neuropathy in his upper extremities/hands. Prior to his last admission, he says he was able to uses walker more to get up and about and do the things he needed to do at home. However, he has been basically wheelchair-bound since being discharged to skilled nursing, though he is able to stand and balance himself to use the microwave. He says his son orders [employer] groceries for him, but he struggles to get these off the porch. He eats mainly microwave meals. He says his physical health has been declining more rapidly over the last week or so. He has been unable to check his blood sugars or manipulate his insulin due to the neuropathy in his hands. As such, he is not been able to check his blood sugars or give himself insulin. He does not have any family locally. At this time, he does not feel he is able to live independently.  The patient says he is had a try cough about five days, but denies any  fever, chills, sinus congestion or other cold or flulike symptoms. His appetite is been poor, and he has had diarrhea, but denies any nausea, vomiting or abdominal pain. He says he feels achy and sore all over due to his chronic back pain. He denies  complaints.    Labs significant for hgb 9.7, sodium 133, chloride 95, , troponin 39, BNP 1680. UA with bacteria, glucosuria, no leukocytosis. Viral panel negative. CXR with no acute findings. Presenting vital signs with hypoxia SpO2 81%, requiring 2 L NC, /80,    I discussed the plan of care with patient and ERP .    Review of Systems  Review of Systems   Constitutional:  Positive for malaise/fatigue and weight loss. Negative for chills, diaphoresis and fever.   HENT:  Negative for congestion, sinus pain and sore throat.    Respiratory:  Positive for cough (dry). Negative for shortness of breath and wheezing.    Cardiovascular:  Negative for chest pain, palpitations, orthopnea and leg swelling.   Gastrointestinal:  Positive for diarrhea. Negative for abdominal pain, constipation, heartburn, melena, nausea and vomiting.   Genitourinary:  Negative for dysuria, flank pain, frequency and urgency.   Musculoskeletal:  Positive for back pain, myalgias and neck pain.   Skin:  Negative for rash.   Neurological:  Positive for tingling (upper and lower extremities/hands/feet). Negative for dizziness and headaches.   Psychiatric/Behavioral:  Positive for depression. The patient is not nervous/anxious.        Past Medical History   has a past medical history of Abnormal electrocardiogram (ECG) (EKG) (11/08/2021), Acute cystitis without hematuria (01/26/2024), Acute esophagitis (07/28/2022), Acute hypoxic respiratory failure (HCC) (07/22/2024), Acute metabolic encephalopathy (06/09/2023), Acute on chronic anemia (05/06/2024), Acute on chronic respiratory failure (HCC) (05/20/2024), Acute respiratory failure with hypoxia (HCC) (07/25/2024), Acute superficial gastritis  without hemorrhage (05/21/2024), Agitation (06/09/2024), KRISTAL (acute kidney injury) (HCC) (11/08/2021), AP (abdominal pain) (06/08/2023), Chest pain in adult (11/08/2021), Chronic renal insufficiency (06/04/2024), CKD (chronic kidney disease) stage 3, GFR 30-59 ml/min (11/07/2021), Diabetes ([facility]), Diabetic ketoacidosis without coma ([facility]) (11/07/2021), Diabetic ketoacidosis without coma associated with type 2 diabetes mellitus ([facility]) (11/07/2021), Diarrhea (03/18/2022), DKA, type 1, not at goal ([facility]) (07/28/2022), Elevated troponin (06/10/2023), Esophagitis (07/28/2022), [place] (hard of hearing), Hypercholesteremia, Impaction of intestine ([facility]) (05/21/2024), Intractable left upper quadrant abdominal pain (05/21/2024), Leukocytosis (05/20/2024), Metabolic acidosis, increased anion gap (11/07/2021), Syncope (06/09/2023), and Transaminitis (06/12/2024).    Surgical History   has a past surgical history that includes other; pr upper gi endoscopy,diagnosis (N/A, 3/14/2023); finger or hand incision and drainage (Right, 6/12/2023); cervical disk and fusion anterior (N/A, 6/4/2024); fusion, spine, lumbar, plif (N/A, 6/7/2024); lumbar laminectomy diskectomy (N/A, 6/7/2024); and pr upper gi endoscopy,diagnosis (N/A, 12/9/2024).     Family History  family history includes Heart Disease in his father.   Family history reviewed with patient. There is no family history that is pertinent to the chief complaint.     Social History   reports that he has never smoked. He has never used smokeless tobacco. He reports current alcohol use. He reports that he does not currently use drugs.    Allergies  Allergies   Allergen Reactions    Ketamine Unspecified     aggressive       Medications  Prior to Admission Medications   Prescriptions Last Dose Informant Patient Reported? Taking?   Alcohol Swabs  Patient No No   Sig: Wipe site with prep pad prior to injection.   Blood Glucose Monitoring Suppl (BLOOD GLUCOSE MONITOR SYSTEM) w/Device Kit   Patient No No   Sig: Test blood sugar as recommended by provider.   Continuous Glucose  (FREESTYLE MAITE 14 DAY READER) Device  Patient No No   Sig: To use with freestyle maite sensor   Continuous Glucose Sensor (FREESTYLE MAITE 14 DAY SENSOR) [facility]  Patient No No   Si Each every 14 days.   Insulin Pen Needle 32 G x 4 mm  Patient No No   Sig: Use one pen needle in pen device to inject insulin four times daily.   Lancets  Patient No No   Sig: Use one lancet to test blood sugar three times daily before meals.   fluconazole (DIFLUCAN) 100 MG Tab New Rx Patient No Yes   Sig: Take 1 Tablet by mouth every day for 21 days.   gabapentin (NEURONTIN) 100 MG Cap 2025 Evening Patient No Yes   Sig: Take 1 Capsule by mouth 3 times a day.   glucose blood (ONETOUCH ULTRA) strip  Patient No No   Sig: Use 1 Strip for Blood glucose checks   insulin aspart (NOVOLOG FLEXPEN) 100 UNIT/ML injection PEN 2025 Morning Patient No Yes   Sig: Inject 3-14 Units under the skin 3 times a day before meals. For glucose:  70 - 150 mg/dL = 0 Units  151 - 200 mg/dL = 3 Units  201 - 250 mg/dL = 4 Units  251 - 300 mg/dL = 7 Units  301 - 350 mg/dL = 10 Units  351 - 400 mg/dL = 12 Units  Over 400 mg/dL  = 14 Units   insulin glargine (LANTUS SOLOSTAR) 100 UNIT/ML Solution Pen-injector injection 2025 Evening Patient No Yes   Sig: Inject 20 Units under the skin every evening.   lidocaine (ASPERFLEX) 4 % Patch 2025 Morning Patient No Yes   Sig: Place 3 Patches on the skin every 24 hours.   metoclopramide (REGLAN) 5 MG tablet 2025 Morning Patient No Yes   Sig: Take 1 Tablet by mouth 3 times a day before meals.   omeprazole (PRILOSEC) 40 MG delayed-release capsule 2025 Morning Patient No Yes   Sig: Take 1 Capsule by mouth 2 times a day.   venlafaxine (EFFEXOR) 37.5 MG Tab 2025 Morning Patient No Yes   Sig: Take 1 Tablet by mouth every day.      Facility-Administered Medications: None       Physical Exam  Temp:  [36.6 °C  (97.9 °F)] 36.6 °C (97.9 °F)  Pulse:  [84-89] 85  Resp:  [12-18] 14  BP: (129-176)/(79-99) 129/79  SpO2:  [79 %-100 %] 100 %  Blood Pressure: (!) 146/91   Temperature: 36.6 °C (97.9 °F)   Pulse: 86   Respiration: 13   Pulse Oximetry: 100 %       Physical Exam  Vitals and nursing note reviewed.   Constitutional:       General: He is not in acute distress.     Appearance: Normal appearance. He is ill-appearing (chronically ill appearing). He is not diaphoretic.   HENT:      Head: Normocephalic and atraumatic.      Nose: Nose normal. No congestion or rhinorrhea.      Mouth/Throat:      Mouth: Mucous membranes are dry.      Pharynx: Oropharynx is clear.   Eyes:      Extraocular Movements: Extraocular movements intact.      Pupils: Pupils are equal, round, and reactive to light.   Cardiovascular:      Rate and Rhythm: Normal rate and regular rhythm.      Pulses: Normal pulses.      Heart sounds: Normal heart sounds. No murmur heard.  Pulmonary:      Effort: Pulmonary effort is normal. No respiratory distress.      Breath sounds: Normal breath sounds.   Abdominal:      General: Bowel sounds are normal. There is no distension.      Palpations: Abdomen is soft.      Tenderness: There is no abdominal tenderness.   Musculoskeletal:         General: Deformity (some contractures to hands) present. Normal range of motion.      Right lower leg: No edema.      Left lower leg: No edema.   Skin:     General: Skin is warm and dry.      Capillary Refill: Capillary refill takes less than 2 seconds.      Findings: No lesion or rash.   Neurological:      General: No focal deficit present.      Mental Status: He is alert and oriented to person, place, and time.   Psychiatric:         Mood and Affect: Mood normal.         Behavior: Behavior normal.         Laboratory:  Recent Labs     01/09/25  1150   WBC 5.2   RBC 3.59*   HEMOGLOBIN 9.7*   HEMATOCRIT 29.2*   MCV 81.3*   MCH 27.0   MCHC 33.2   RDW 41.1   PLATELETCT 204   MPV 9.1     Recent  Labs     01/09/25  1150   SODIUM 133*   POTASSIUM 4.5   CHLORIDE 95*   CO2 30   GLUCOSE 429*   BUN 17   CREATININE 0.65   CALCIUM 8.6     Recent Labs     01/09/25  1150   ALTSGPT 21   ASTSGOT 14   ALKPHOSPHAT 84   TBILIRUBIN 0.3   GLUCOSE 429*         Recent Labs     01/09/25  1150   NTPROBNP 1680*         Recent Labs     01/09/25  1150   TROPONINT 39*       Imaging:  DX-CHEST-PORTABLE (1 VIEW)   Final Result      No acute cardiopulmonary disease evident.            ASSESSMENT/PLAN:  * Physical debility- (present on admission)  Assessment & Plan  Progressive weakness over the last 2 weeks. Normally able to use walker, but now restricted to WC. UA with bacteruria, patient also hypoxic. Viral panel negative. Afebrile with no tachycardia. Dc'd from SNF 2 weeks ago, lives alone  - procal pending  - PT/OT eval and treat  - fall precautions  - will likely require placement. Feels group home may be appropriate.    Acute hypoxic respiratory failure (HCC)- (present on admission)  Assessment & Plan  SpO2 down to upper 70s off oxygen. Viral panel negative. CXR negative.  - procal to labs  - IS  - Supplemental oxygen to keep sats > 90%  - hold off on any further abx until procal results    Generalized weakness- (present on admission)  Assessment & Plan  Dc'd from SNF 2 weeks ago. Has had progressive weakness since. Normally able to use walker, but now restricted to WC. Hx back pain/surgeries. Poor oral intake due to difficulty using hands/getting about  - PT/OT eval and treat  - infectious workup so far negative    Uncontrolled type 2 diabetes mellitus with hyperglycemia (HCC)- (present on admission)  Assessment & Plan  Hx neuropathy and gastroparesis. Reports he has not checked his blood sugars or taken his insulin lately due to difficulty manipulating insulin and products with his hands  - acchecheck ac/hs with low dose ssi coverage  - continue gabapentin  - DM diet  - A1c    Bacteriuria- (present on admission)  Assessment &  Plan  UA with nitrite negative urine, +bacteria, no leukocytes. WBC with no leukocytosis. Patient is afebrile. Given  Ancef in the ED  - Hold off on any further abx for now  - f/u urine cultures    Elevated troponin- (present on admission)  Assessment & Plan  Chronically elevated. Baseline  - no further workup    CKD (chronic kidney disease)- (present on admission)  Assessment & Plan  Per hx. Cr function appears normal.  - Monitor      Justification for Admission Status  I anticipate this patient is appropriate for observation status at this time because patient requires further evaluation and treatment for UTI/hyperglycemia and weakness with PT/OT evaluation    Patient will need a Med/Surg bed on MEDICAL service .  The need is secondary to need for PT/OT eval, treatment of hyperglycemia with exogenous insulin.    VTE prophylaxis: SCDs/TEDs and heparin ppx

## 2025-01-10 NOTE — THERAPY
Physical Therapy Contact Note    Patient Name: Christoph Taylor  Age:  59 y.o., Sex:  male  Medical Record #: [id number]  Today's Date: 1/10/2025    PT consult received, pt known to therapist have been attempted to get pt to consider long term care/group home for quite a while now (5/2024) however he has been resistant; it appears he is at a level he may consider now and likely will need a long term w/c accessible group home or long term SNF; he did have family in the [place] area and was encouraged to seek their help with the transition but he was also resistant to that; will round when able to evaluate but given chronicity of deficits he is likely at a new baseline of require 24/7 support for at least ADLs and w/c, short distance ambulation only due to AFO need;     Vanita SHEETS, PT,  [phone]

## 2025-01-10 NOTE — WOUND TEAM
[facility]  Inpatient Services  Initial Wound and Skin Care Evaluation    Admission Date: 1/9/2025     Last order of IP CONSULT TO WOUND CARE was found on 1/10/2025 from Hospital Encounter on 1/9/2025     HPI, PMH, SH: Reviewed    Past Surgical History:   Procedure Laterality Date    NV UPPER GI ENDOSCOPY,DIAGNOSIS N/A 12/9/2024    Procedure: GASTROSCOPY WITH BIOPSY;  Surgeon: Rdaha Ivan M.D.;  Location: SURGERY SAME DAY [facility];  Service: Gastroenterology    FUSION, SPINE, LUMBAR, PLIF N/A 6/7/2024    Procedure: FUSION, SPINE, LUMBAR, PLIF- REDO LUMBAR 4-5 LAMINECTOMY WITH L4-5 STEALTH FUSION;  Surgeon: Moo Pulido III, M.D.;  Location: SURGERY [facility];  Service: Neurosurgery    LUMBAR LAMINECTOMY DISKECTOMY N/A 6/7/2024    Procedure: LAMINECTOMY, SPINE, LUMBAR, WITH DISCECTOMY;  Surgeon: Moo Pulido III, M.D.;  Location: SURGERY [facility];  Service: Neurosurgery    CERVICAL DISK AND FUSION ANTERIOR N/A 6/4/2024    Procedure: C4-C7 ANTERIOR CERVICAL DISC AND FUSION;  Surgeon: Moo Pulido III, M.D.;  Location: SURGERY [facility];  Service: Neurosurgery    FINGER OR HAND INCISION AND DRAINAGE Right 6/12/2023    Procedure: INCISION AND DRAINAGE, HAND - THUMB;  Surgeon: Ace Shaw M.D.;  Location: SURGERY [facility];  Service: Orthopedics    NV UPPER GI ENDOSCOPY,DIAGNOSIS N/A 3/14/2023    Procedure: GASTROSCOPY;  Surgeon: Atilio Freed M.D.;  Location: SURGERY [facility];  Service: Gastroenterology    OTHER      appy, lumbar fusion     Social History     Tobacco Use    Smoking status: Never    Smokeless tobacco: Never   Substance Use Topics    Alcohol use: Yes     Comment: occ     Chief Complaint   Patient presents with    Weakness     Increasing weakness last week, decreased dexterity with upper extremities baseline, thoracic back pain and worsening last x4 days, prior back surgeries, Rx Gabapentin    High Blood Sugar     With , has not been able to give  self insulin last x4 days, at ER POC glucose 382     Diagnosis: Physical debility [R53.81]    Unit where seen by Wound Team: [id number]     WOUND CONSULT RELATED TO:  B feet, B knees, B hands    WOUND TEAM PLAN OF CARE - Frequency of Follow-up:   Nursing to follow dressing orders written for wound care. Contact wound team if area fails to progress, deteriorates or with any questions/concerns if something comes up before next scheduled follow up (See below as to whether wound is following and frequency of wound follow up)   Not following, consult as needed  -      WOUND HISTORY:   Pt is a 60 yo male who presented to the ED for weakness.  Pt hx includes poorly controlled DM.  Pt states that he usually walks and sometimes uses a wheel chair.  However recently he became so weak he had to crawl to his front door to receive a grocery delivery and he developed knee and toe wounds.  Pt states the fissures on his hands have been there awhile       WOUND ASSESSMENT/LDA         Wound 12/07/24 Finger, Thumb Left Burn blister (Active)   Wound Image   12/13/24 0843   Site Assessment Dry 01/10/25 1300   Periwound Assessment Clean;Intact;Dry;Pink 12/13/24 0843   Margins Defined edges 12/13/24 0843   Closure Other (Comment) 12/13/24 0843   Drainage Amount None 01/10/25 1300   Treatments Cleansed;Site care 12/13/24 0843   Wound Cleansing Approved Wound Cleanser 12/13/24 0843   Periwound Protectant No-sting Skin Prep 12/13/24 0843   Dressing Status Clean;Dry;Intact 12/13/24 1547   Dressing Changed Changed 01/10/25 1300   Dressing Cleansing/Solutions Not Applicable 12/13/24 1547   Dressing Options Hydrocolloid Thin;Hypafix Tape 01/10/25 1300   Dressing Change/Treatment Frequency Every 72 hrs, and As Needed 01/10/25 1300   NEXT Dressing Change/Treatment Date 01/13/25 01/10/25 1300   NEXT Weekly Photo (Inpatient Only) 01/17/25 01/10/25 1300   Wound Team Following Not following 12/10/24 1500   Wound Length (cm) 1.5 cm 12/10/24 1500   Wound  Width (cm) 1.5 cm 12/10/24 1500   Wound Surface Area (cm^2) 2.25 cm^2 12/10/24 1500   Shape Oval 12/10/24 1500   Wound Odor None 12/10/24 1500       Wound 01/09/25 Knee Anterior Left (Active)   Wound Image    01/10/25 1300   Periwound Assessment [place] 01/10/25 1300   Drainage Amount Scant 01/10/25 1300   Drainage Description Sanguineous 01/10/25 1300   Dressing Status Other (Comment) 01/10/25 1300   Dressing Changed Changed 01/10/25 1300   Dressing Options Hydrocolloid Thin;Silicone Adhesive Foam 01/10/25 1300   Dressing Change/Treatment Frequency Every 72 hrs, and As Needed 01/10/25 1300   NEXT Dressing Change/Treatment Date 01/13/25 01/10/25 1300   NEXT Weekly Photo (Inpatient Only) 01/17/25 01/10/25 1300       Wound 01/09/25 Knee Anterior Right (Active)   Wound Image    01/10/25 1300   Periwound Assessment [place] 01/10/25 1300   Drainage Amount Small 01/10/25 1300   Drainage Description Serosanguineous 01/10/25 1300   Dressing Status Other (Comment) 01/10/25 1300   Dressing Changed Changed 01/10/25 1300   Dressing Options Hydrocolloid Thin;Silicone Adhesive Foam 01/10/25 1300   Dressing Change/Treatment Frequency Every 72 hrs, and As Needed 01/10/25 1300   NEXT Dressing Change/Treatment Date 01/13/25 01/10/25 1300   NEXT Weekly Photo (Inpatient Only) 01/17/25 01/10/25 1300       Wound 01/09/25 Finger, Thumb;Finger, 4th Ventral Right (Active)   Wound Image    01/10/25 1300   Periwound Assessment Dry;Callused 01/10/25 1300   Drainage Amount None 01/10/25 1300   Dressing Status Other (Comment) 01/10/25 1300   Dressing Changed Changed 01/10/25 1300   Dressing Options Hydrocolloid Thin;Hypafix Tape 01/10/25 1300   Dressing Change/Treatment Frequency Every 72 hrs, and As Needed 01/10/25 1300   NEXT Dressing Change/Treatment Date 01/13/25 01/10/25 1300   NEXT Weekly Photo (Inpatient Only) 01/17/25 01/10/25 1300       Wound 01/09/25 Toe, 2nd Anterior Left (Active)   Wound Image    01/10/25 1300   Drainage Amount Scant  01/10/25 1300   Drainage Description Serosanguineous 01/10/25 1300   Dressing Status Other (Comment) 01/10/25 1300   Dressing Changed Changed 01/10/25 1300   Dressing Options Hydrocolloid Thin;Hypafix Tape 01/10/25 1300   Dressing Change/Treatment Frequency Every 72 hrs, and As Needed 01/10/25 1300   NEXT Dressing Change/Treatment Date 01/13/25 01/10/25 1300   NEXT Weekly Photo (Inpatient Only) 01/17/25 01/10/25 1300       Wound 01/09/25 Toe, 2nd Anterior Right (Active)       Wound 01/09/25 Finger, Thumb Ventral Right between thumb and index finger (Active)   Site Assessment Dry 01/10/25 1300   Periwound Assessment Callused 01/10/25 1300   Drainage Amount None 01/10/25 1300   Dressing Status Other (Comment) 01/10/25 1300   Dressing Options Hydrocolloid Thin;Hypafix Tape 01/10/25 1300   Dressing Change/Treatment Frequency Every 72 hrs, and As Needed 01/10/25 1300   NEXT Dressing Change/Treatment Date 01/13/25 01/10/25 1300   NEXT Weekly Photo (Inpatient Only) 01/17/25 01/10/25 1300           Vascular:    MARCO A:   No results found.    Lab Values:    Lab Results   Component Value Date/Time    WBC 5.1 01/10/2025 12:08 AM    RBC 3.73 (L) 01/10/2025 12:08 AM    HEMOGLOBIN 9.8 (L) 01/10/2025 12:08 AM    HEMATOCRIT 30.2 (L) 01/10/2025 12:08 AM    CREACTPROT 1.01 (H) 12/06/2024 04:54 PM    SEDRATEWES 43 (H) 07/24/2024 02:33 AM    HBA1C 9.8 (H) 01/10/2025 12:08 AM         Culture Results show:  No results found for this or any previous visit (from the past 720 hours).    Pain Level/Medicated:  None, Tolerated without pain medication       INTERVENTIONS BY WOUND TEAM:  C Performed standard wound care which includes appropriate positioning, dressing removal and non-selective debridement. Pictures and measurements obtained weekly if/when required.    Wound:  L second toe, B knees, R thumb, B web space, R middle finger  Preparation for Dressing removal: Removed without difficulty  Cleansed/Non-selectively Debrided with:  Wound  cleanser and Gauze  Non-Excisional Conservative Sharp debridement: Slough debrided away using scalpel < 20cm2 debrided down to slough.  Scant bleeding noted, controlled with manual pressure.  Doreen wound: Cleansed with Wound cleanser and Gauze, Prepped with No Sting  Primary Dressing:  hydrocolloid, silicone foam and hypafix    Advanced Wound Care Discharge Planning  Number of Clinicians necessary to complete wound care: 1  Is patient requiring IV pain medications for dressing changes:  No   Length of time for dressing change 60 min. (This does not include chart review, pre-medication time, set up, clean up or time spent charting.)    Interdisciplinary consultation: Patient, Bedside RN (), .      EVALUATION / RATIONALE FOR TREATMENT:     Date:  01/10/25  Wound Status:      Pt has some PTWs from friction/trauma.  These wound should resolve with wound care.  Fissures on hands should resolve with colloid to soften doreen callus that will allow fissures to close.           Goals: Steady decrease in wound area weekly.    NURSING PLAN OF CARE ORDERS:  Dressing changes: See Dressing Care orders    NUTRITION   PREVENTATIVE INTERVENTIONS:    Q shift Oscar - performed per nursing policy  Q shift pressure point assessments - performed per nursing policy    Surface/Positioning  Reposition q 2 hours - Currently in Place    Offloading/Redistribution  Sacral offloading dressing (Silicone dressing) - Currently in Place  Heel offloading dressing (Silicone dressing) - Currently in Place    Anticipated discharge plans:  TBD        Vac Discharge Needs:  Vac Discharge plan is purely a recommendation from wound team and not a requirement for discharge unless otherwise stated by physician.  Not Applicable Pt not on a wound vac

## 2025-01-10 NOTE — ASSESSMENT & PLAN NOTE
MRI with compression T2 and T12 fracture, multilevel bulging disc through thoracic spine.  No cord impingement.  Neurosurgery recommended nonoperative management  Follow-up for epidural injection as outpatient  Placement is pending    1/21 Patient reports ambulatory status prior to this admission, to follow-up with orthopedic surgery for AFO evaluation  , Will reconsult PT OT for AFO as needed, will consult orthopedic surgery as needed  -Patient reports low back pain, has Zanaflex as needed, will add Flexeril 3 times daily  Continue pain control  Encourage activity

## 2025-01-10 NOTE — ASSESSMENT & PLAN NOTE
MRI T-spine with T12 old compression fracture, T2 old superior endplate compression fracture, multiple level disc bulges T3-4 T7-T8 T8-T9, T11-T12, variable mass effect with out  central stenosis or cord impingement, MRI L-spine with T12 compression fracture, previous L4-L5 laminectomy, multilevel degenerative changes.  - Neurosurgery has been consulted  - Recommending non operative management at this time  - Continue to follow-up outpatient for possible epidural  - Continue gabapentin, lidocaine and oxycodone as needed for pain management  - PT OT recommending postacute rehabilitation  - I personally reviewed MRI images with patient at bedside 1/16 1/21 placement pending, f/u with CM  - ongoing LBP, schedule flexeril    1/22 PT reeval with AFO now available  Pain control, encourage activity, placement pending    1/23 PT  with AFO, encourage activity, pending placement, CM to assist, ? GH  1/24 PT to work with patient with AFO, consult replaced  Encourage activity    1/25 patient worked with PT with AFO, encourage activity    1/26 cont activity, placement pending    1/27 difficult discharge,  assisting  Considering transfer to another state to be closer to family, patient can no longer live alone, cannot self administer insulin or take care of himself  Encourage activity  Placement pending

## 2025-01-10 NOTE — ASSESSMENT & PLAN NOTE
A1C 9.8  - Continue Lantus to 25 units in the evening  - Continue sliding scale insulin; has required 2 units SSI in last 24 hours  - Patient states he is unable to drop insulin at home due to hand coordination issues.  We will try to minimize sliding scale insulin and discharged with only Lantus pens.  - Monitor BG trend.   - Accu-Cheks before meals and at bedtime.   - Goal to keep BG between 140-180 per 2019 ADA guidelines     1/21 hypoglycemia this am, decrease Lantus to 18 units, cont ssi  - f/u am labs    1/22 bs improved, on lantus 18 units, cont and monitor  ? GH    1/23 improvng bs, increase Lantus 20 u, add metformin, monitor    1/24 improving, cont lantus, f/u am bmp, consider increasing metformin    1/25 hypoglycemia this a.m., decreased metformin and Lantus, monitor closely    1/26 improved bs, now on lantus 10u, monitor, encourage activity    1/27 improved control, continue Lantus

## 2025-01-10 NOTE — CARE PLAN
The patient is Stable - Low risk of patient condition declining or worsening    Shift Goals  Clinical Goals: Blood sugar control, prevent falls, PT/OT, case mgmt in am  Patient Goals: Sleep, pain control, case mgmt tomorrow  Family Goals: KATHERINE    Progress made toward(s) clinical / shift goals:    Problem: Pain - Standard  Goal: Alleviation of pain or a reduction in pain to the patient’s comfort goal  Description: Target End Date:  Prior to discharge or change in level of care    Document on Vitals flowsheet    1.  Document pain using the appropriate pain scale per order or unit policy  2.  Educate and implement non-pharmacologic comfort measures (i.e. relaxation, distraction, massage, cold/heat therapy, etc.)  3.  Pain management medications as ordered  4.  Reassess pain after pain med administration per policy  5.  If opiods administered assess patient's response to pain medication is appropriate per POSS sedation scale  6.  Follow pain management plan developed in collaboration with patient and interdisciplinary team (including palliative care or pain specialists if applicable)  Outcome: Progressing     Problem: Knowledge Deficit - Standard  Goal: Patient and family/care givers will demonstrate understanding of plan of care, disease process/condition, diagnostic tests and medications  Description: Target End Date:  1-3 days or as soon as patient condition allows    Document in Patient Education    1.  Patient and family/caregiver oriented to unit, equipment, visitation policy and means for communicating concern  2.  Complete/review Learning Assessment  3.  Assess knowledge level of disease process/condition, treatment plan, diagnostic tests and medications  4.  Explain disease process/condition, treatment plan, diagnostic tests and medications  Outcome: Progressing     Problem: Skin Integrity  Goal: Skin integrity is maintained or improved  Description: Target End Date:  Prior to discharge or change in level of  care    Document interventions on Skin Risk/Oscar flowsheet groups and corresponding LDA    1.  Assess and monitor skin integrity, appearance and/or temperature  2.  Assess risk factors for impaired skin integrity and/or pressures ulcers  3.  Implement precautions to protect skin integrity in collaboration with interdisciplinary team  4.  Implement pressure ulcer prevention protocol if at risk for skin breakdown  5.  Confirm wound care consult if at risk for skin breakdown  6.  Ensure patient use of pressure relieving devices  (Low air loss bed, waffle overlay, heel protectors, ROHO cushion, etc)  Outcome: Progressing     Problem: Fall Risk  Goal: Patient will remain free from falls  Description: Target End Date:  Prior to discharge or change in level of care    Document interventions on the Lizette Marroquin Fall Risk Assessment    1.  Assess for fall risk factors  2.  Implement fall precautions  Outcome: Progressing       Patient is not progressing towards the following goals:

## 2025-01-10 NOTE — WOUND TEAM
[facility]  Inpatient Services  Initial Wound and Skin Care Evaluation    Admission Date: 1/9/2025     Last order of IP CONSULT TO WOUND CARE was found on 1/10/2025 from Hospital Encounter on 1/9/2025     HPI, PMH, SH: Reviewed    Past Surgical History:   Procedure Laterality Date    AR UPPER GI ENDOSCOPY,DIAGNOSIS N/A 12/9/2024    Procedure: GASTROSCOPY WITH BIOPSY;  Surgeon: Radha Ivan M.D.;  Location: SURGERY SAME DAY [facility];  Service: Gastroenterology    FUSION, SPINE, LUMBAR, PLIF N/A 6/7/2024    Procedure: FUSION, SPINE, LUMBAR, PLIF- REDO LUMBAR 4-5 LAMINECTOMY WITH L4-5 STEALTH FUSION;  Surgeon: Moo Pulido III, M.D.;  Location: SURGERY [facility];  Service: Neurosurgery    LUMBAR LAMINECTOMY DISKECTOMY N/A 6/7/2024    Procedure: LAMINECTOMY, SPINE, LUMBAR, WITH DISCECTOMY;  Surgeon: Moo Pulido III, M.D.;  Location: SURGERY [facility];  Service: Neurosurgery    CERVICAL DISK AND FUSION ANTERIOR N/A 6/4/2024    Procedure: C4-C7 ANTERIOR CERVICAL DISC AND FUSION;  Surgeon: Moo Pulido III, M.D.;  Location: SURGERY [facility];  Service: Neurosurgery    FINGER OR HAND INCISION AND DRAINAGE Right 6/12/2023    Procedure: INCISION AND DRAINAGE, HAND - THUMB;  Surgeon: Ace Shaw M.D.;  Location: SURGERY [facility];  Service: Orthopedics    AR UPPER GI ENDOSCOPY,DIAGNOSIS N/A 3/14/2023    Procedure: GASTROSCOPY;  Surgeon: Atilio Freed M.D.;  Location: SURGERY [facility];  Service: Gastroenterology    OTHER      appy, lumbar fusion     Social History     Tobacco Use    Smoking status: Never    Smokeless tobacco: Never   Substance Use Topics    Alcohol use: Yes     Comment: occ     Chief Complaint   Patient presents with    Weakness     Increasing weakness last week, decreased dexterity with upper extremities baseline, thoracic back pain and worsening last x4 days, prior back surgeries, Rx Gabapentin    High Blood Sugar     With , has not been able to give  self insulin last x4 days, at ER POC glucose 382     Diagnosis: Physical debility [R53.81]    Unit where seen by Wound Team: T205/00     WOUND CONSULT RELATED TO:  ***    WOUND TEAM PLAN OF CARE - Frequency of Follow-up:   Nursing to follow dressing orders written for wound care. Contact wound team if area fails to progress, deteriorates or with any questions/concerns if something comes up before next scheduled follow up (See below as to whether wound is following and frequency of wound follow up)   {Follow-up Frequency:8155821}    WOUND HISTORY:   Pt is a 60 yo male who presented to the ED for weakness and high blood sugar       WOUND ASSESSMENT/LDA        Vascular:    MARCO A:   No results found.    Lab Values:    Lab Results   Component Value Date/Time    WBC 5.1 01/10/2025 12:08 AM    RBC 3.73 (L) 01/10/2025 12:08 AM    HEMOGLOBIN 9.8 (L) 01/10/2025 12:08 AM    HEMATOCRIT 30.2 (L) 01/10/2025 12:08 AM    CREACTPROT 1.01 (H) 12/06/2024 04:54 PM    SEDRATEWES 43 (H) 07/24/2024 02:33 AM    HBA1C 9.8 (H) 01/10/2025 12:08 AM         Culture Results show:  No results found for this or any previous visit (from the past 720 hours).    Pain Level/Medicated:  {Pain Medications:13212}       INTERVENTIONS BY WOUND TEAM:  Chart and images reviewed. Discussed with bedside RN. All areas of concern (based on picture review, LDA review and discussion with bedside RN) have been thoroughly assessed. Documentation of areas based on significant findings. This RN in to assess patient. Performed standard wound care which includes appropriate positioning, dressing removal and non-selective debridement. Pictures and measurements obtained weekly if/when required.  {Wound Location & Interventions:6738611}    Advanced Wound Care Discharge Planning  Number of Clinicians necessary to complete wound care: ***  Is patient requiring IV pain medications for dressing changes:  {YES/NO:20266}  Length of time for dressing change *** min. (This does not  "include chart review, pre-medication time, set up, clean up or time spent charting.)    Interdisciplinary consultation: Patient, Bedside RN (***), {Wound Team:98338}.  Pressure injury and staging reviewed with {Wound Team:31582}.    EVALUATION / RATIONALE FOR TREATMENT:     Date:  01/10/25  Wound Status:  {Wound Status:1566373}    ***         Goals: Steady decrease in wound area and depth weekly.    NURSING PLAN OF CARE ORDERS:  {Nursing Plan of Care:4072189}    NUTRITION RECOMMENDATIONS   Wound Team Recommendations:  {IP Wound Care Team Nutrition:4945805}    DIET ORDERS (From admission to next 24h)       Start     Ordered    01/09/25 1558  Diet Order Diet: Consistent CHO (Diabetic)  ALL MEALS        Question:  Diet:  Answer:  Consistent CHO (Diabetic)    01/09/25 4739                    PREVENTATIVE INTERVENTIONS:    Q shift Oscar - performed per nursing policy  Q shift pressure point assessments - performed per nursing policy    {RSkin Interventions:2275790}    Anticipated discharge plans:  {Anticipated DC Plans:5890119}        Vac Discharge Needs:  Vac Discharge plan is purely a recommendation from wound team and not a requirement for discharge unless otherwise stated by physician.  {Wound Vac DC Needs:3368066::\"Not Applicable Pt not on a wound vac\"}   "

## 2025-01-10 NOTE — PROGRESS NOTES
Report received from ED RN. Assume care. Pt is AAOx4.  Assessment completed. VSS. C/o upper back  pain, W/C bound since last year. IS education provided and demonstrated pulling 2750 ml. Pt was update for the care for the day. White board updated, All question answered. Pt has call light within reach,  bed is in the lowest position. Pt has no other needs at this time.

## 2025-01-11 LAB
ANION GAP SERPL CALC-SCNC: 9 MMOL/L (ref 7–16)
BACTERIA UR CULT: ABNORMAL
BACTERIA UR CULT: ABNORMAL
BUN SERPL-MCNC: 26 MG/DL (ref 8–22)
CALCIUM SERPL-MCNC: 8.4 MG/DL (ref 8.5–10.5)
CHLORIDE SERPL-SCNC: 99 MMOL/L (ref 96–112)
CO2 SERPL-SCNC: 29 MMOL/L (ref 20–33)
CREAT SERPL-MCNC: 0.57 MG/DL (ref 0.5–1.4)
GFR SERPLBLD CREATININE-BSD FMLA CKD-EPI: 112 ML/MIN/1.73 M 2
GLUCOSE BLD STRIP.AUTO-MCNC: 221 MG/DL (ref 65–99)
GLUCOSE BLD STRIP.AUTO-MCNC: 243 MG/DL (ref 65–99)
GLUCOSE BLD STRIP.AUTO-MCNC: 257 MG/DL (ref 65–99)
GLUCOSE BLD STRIP.AUTO-MCNC: 266 MG/DL (ref 65–99)
GLUCOSE SERPL-MCNC: 176 MG/DL (ref 65–99)
POTASSIUM SERPL-SCNC: 4.5 MMOL/L (ref 3.6–5.5)
SIGNIFICANT IND 70042: ABNORMAL
SITE SITE: ABNORMAL
SODIUM SERPL-SCNC: 137 MMOL/L (ref 135–145)
SOURCE SOURCE: ABNORMAL

## 2025-01-11 PROCEDURE — 82962 GLUCOSE BLOOD TEST: CPT | Mod: 91

## 2025-01-11 PROCEDURE — 700101 HCHG RX REV CODE 250: Performed by: NURSE PRACTITIONER

## 2025-01-11 PROCEDURE — 36415 COLL VENOUS BLD VENIPUNCTURE: CPT

## 2025-01-11 PROCEDURE — A9270 NON-COVERED ITEM OR SERVICE: HCPCS | Performed by: INTERNAL MEDICINE

## 2025-01-11 PROCEDURE — 700102 HCHG RX REV CODE 250 W/ 637 OVERRIDE(OP)

## 2025-01-11 PROCEDURE — 700102 HCHG RX REV CODE 250 W/ 637 OVERRIDE(OP): Performed by: INTERNAL MEDICINE

## 2025-01-11 PROCEDURE — 700111 HCHG RX REV CODE 636 W/ 250 OVERRIDE (IP): Mod: JZ | Performed by: INTERNAL MEDICINE

## 2025-01-11 PROCEDURE — 80048 BASIC METABOLIC PNL TOTAL CA: CPT

## 2025-01-11 PROCEDURE — A9270 NON-COVERED ITEM OR SERVICE: HCPCS

## 2025-01-11 PROCEDURE — 99233 SBSQ HOSP IP/OBS HIGH 50: CPT | Performed by: STUDENT IN AN ORGANIZED HEALTH CARE EDUCATION/TRAINING PROGRAM

## 2025-01-11 PROCEDURE — A9270 NON-COVERED ITEM OR SERVICE: HCPCS | Performed by: NURSE PRACTITIONER

## 2025-01-11 PROCEDURE — 770006 HCHG ROOM/CARE - MED/SURG/GYN SEMI*

## 2025-01-11 PROCEDURE — 700102 HCHG RX REV CODE 250 W/ 637 OVERRIDE(OP): Performed by: NURSE PRACTITIONER

## 2025-01-11 RX ORDER — LORAZEPAM 2 MG/ML
1 INJECTION INTRAMUSCULAR ONCE
Status: DISCONTINUED | OUTPATIENT
Start: 2025-01-11 | End: 2025-01-11

## 2025-01-11 RX ORDER — LORAZEPAM 2 MG/ML
1 INJECTION INTRAMUSCULAR
Status: COMPLETED | OUTPATIENT
Start: 2025-01-11 | End: 2025-01-12

## 2025-01-11 RX ADMIN — INSULIN LISPRO 3 UNITS: 100 INJECTION, SOLUTION INTRAVENOUS; SUBCUTANEOUS at 20:50

## 2025-01-11 RX ADMIN — OXYCODONE 5 MG: 5 TABLET ORAL at 18:11

## 2025-01-11 RX ADMIN — LIDOCAINE 3 PATCH: 4 PATCH TOPICAL at 18:13

## 2025-01-11 RX ADMIN — GABAPENTIN 200 MG: 100 CAPSULE ORAL at 05:45

## 2025-01-11 RX ADMIN — GABAPENTIN 200 MG: 100 CAPSULE ORAL at 12:38

## 2025-01-11 RX ADMIN — ENOXAPARIN SODIUM 40 MG: 100 INJECTION SUBCUTANEOUS at 18:13

## 2025-01-11 RX ADMIN — VENLAFAXINE 37.5 MG: 75 TABLET ORAL at 05:45

## 2025-01-11 RX ADMIN — INSULIN LISPRO 2 UNITS: 100 INJECTION, SOLUTION INTRAVENOUS; SUBCUTANEOUS at 18:19

## 2025-01-11 RX ADMIN — INSULIN LISPRO 2 UNITS: 100 INJECTION, SOLUTION INTRAVENOUS; SUBCUTANEOUS at 08:36

## 2025-01-11 RX ADMIN — INSULIN LISPRO 3 UNITS: 100 INJECTION, SOLUTION INTRAVENOUS; SUBCUTANEOUS at 12:40

## 2025-01-11 RX ADMIN — INSULIN GLARGINE-YFGN 20 UNITS: 100 INJECTION, SOLUTION SUBCUTANEOUS at 18:20

## 2025-01-11 RX ADMIN — GABAPENTIN 200 MG: 100 CAPSULE ORAL at 18:12

## 2025-01-11 RX ADMIN — OXYCODONE 5 MG: 5 TABLET ORAL at 08:41

## 2025-01-11 RX ADMIN — OMEPRAZOLE 40 MG: 20 CAPSULE, DELAYED RELEASE ORAL at 18:13

## 2025-01-11 RX ADMIN — OMEPRAZOLE 40 MG: 20 CAPSULE, DELAYED RELEASE ORAL at 05:45

## 2025-01-11 ASSESSMENT — PAIN DESCRIPTION - PAIN TYPE
TYPE: ACUTE PAIN

## 2025-01-11 ASSESSMENT — ENCOUNTER SYMPTOMS
NAUSEA: 0
COUGH: 0
VOMITING: 0

## 2025-01-11 NOTE — PROGRESS NOTES
Report received from PENNIE Bach.  Assumed care of patient.  Assessment complete.  Plan of care gone over with the patient and all concerns addressed.  Patient resting in bed. Patient A & O  x 4.  No apparent signs of distress.  Safety precautions in place.  Patient educated to call for assistance.  Hourly rounding in place.

## 2025-01-11 NOTE — PROGRESS NOTES
Hospital Medicine Daily Progress Note    Date of Service  1/10/2025    Chief Complaint  59-year-old male with prior suicidal ideation, poorly controlled diabetes mellitus type 2, housing insecurity and severe protein calorie malnutrition with acute and chronic back pain presented to hospital generalized weakness and acute on chronic back pain. Patient states that he is unable to take care of himself and has a hard time administering insulin, buying food for himself, taking his blood sugars. Additionally says that he has worsening neuropathy of the feet and hands. His pain is worse than ever. He has no additional help here. In the ER he was found to be hyperglycemic and quite weak. Additionally there was concern for possible UTI and he was given IV Ancef and a one-time dose of oxycodone 5 mg.     Hospital Course  See above    Interval Problem Update  1/10  Remains hyperglycemic, but somewhat improved. Continued back pain that is persistent in the mid to lower back. PT evaluated him and recommended LTC. I had an extensive discussion with patient about seeking out LTC with his family in [place], but he is hesitant to do so. Remains hypoxic of unclear etiology.     I have discussed this patient's plan of care and discharge plan at IDT rounds today with Case Management, Nursing, Nursing leadership, and other members of the IDT team.    Consultants/Specialty  none    Code Status  Full Code    Disposition  The patient is not medically cleared for discharge to home or a post-acute facility.  Anticipate discharge to: skilled nursing facility    I have placed the appropriate orders for post-discharge needs.    Review of Systems  Review of Systems   Constitutional:  Positive for malaise/fatigue. Negative for chills and fever.   Gastrointestinal:  Negative for abdominal pain, nausea and vomiting.   Musculoskeletal:  Positive for joint pain and myalgias.   Neurological:  Positive for weakness.        Physical Exam  Temp:   [36.1 °C (96.9 °F)-36.2 °C (97.2 °F)] 36.1 °C (96.9 °F)  Pulse:  [74-89] 74  Resp:  [14-16] 16  BP: ()/(54-82) 128/80  SpO2:  [96 %-100 %] 100 %    Physical Exam  Vitals and nursing note reviewed.   Constitutional:       General: He is not in acute distress.     Appearance: He is well-developed.      Comments: Weak appearing  Cooperative  Pale   HENT:      Head: Normocephalic and atraumatic.      Mouth/Throat:      Pharynx: No oropharyngeal exudate.   Eyes:      General: No scleral icterus.     Pupils: Pupils are equal, round, and reactive to light.   Neck:      Thyroid: No thyromegaly.   Cardiovascular:      Rate and Rhythm: Normal rate and regular rhythm.      Heart sounds: Normal heart sounds. No murmur heard.  Pulmonary:      Effort: Pulmonary effort is normal. No respiratory distress.      Breath sounds: Normal breath sounds. No wheezing.   Abdominal:      General: Bowel sounds are normal. There is no distension.      Palpations: Abdomen is soft.      Tenderness: There is no abdominal tenderness. There is no guarding or rebound.   Musculoskeletal:         General: Swelling and tenderness present. Normal range of motion.      Cervical back: Normal range of motion and neck supple.   Skin:     General: Skin is warm and dry.      Findings: Bruising present. No rash.      Comments: Scabs   Neurological:      Mental Status: He is alert and oriented to person, place, and time.      Cranial Nerves: No cranial nerve deficit.         Fluids    Intake/Output Summary (Last 24 hours) at 1/10/2025 1709  Last data filed at 1/10/2025 0500  Gross per 24 hour   Intake --   Output 400 ml   Net -400 ml        Laboratory  Recent Labs     01/09/25  1150 01/10/25  0008   WBC 5.2 5.1   RBC 3.59* 3.73*   HEMOGLOBIN 9.7* 9.8*   HEMATOCRIT 29.2* 30.2*   MCV 81.3* 81.0*   MCH 27.0 26.3*   MCHC 33.2 32.5   RDW 41.1 41.5   PLATELETCT 204 208   MPV 9.1 8.9*     Recent Labs     01/09/25  1150 01/10/25  0008   SODIUM 133* 136   POTASSIUM  4.5 3.8   CHLORIDE 95* 97   CO2 30 32   GLUCOSE 429* 220*   BUN 17 18   CREATININE 0.65 0.86   CALCIUM 8.6 8.6                   Imaging  DX-CHEST-PORTABLE (1 VIEW)   Final Result      No acute cardiopulmonary disease evident.      MR-LUMBAR SPINE-W/O    (Results Pending)   MR-THORACIC SPINE-W/O    (Results Pending)        Assessment/Plan  * Physical debility- (present on admission)  Assessment & Plan  WC cbound  No clear e/o infection  Increase gabapentin and adjust multimodal pain therapy  CBC is normal, I personally reviewed cbc on 1/10    Bacteriuria- (present on admission)  Assessment & Plan  No abx's for now given lack of symptoms    Acute hypoxic respiratory failure (HCC)- (present on admission)  Assessment & Plan  Slowly improving  CXR normal  Doubt PE  IS aggressive  Try to wean    Generalized weakness- (present on admission)  Assessment & Plan  FTT but worsening back pain  Get repeat MRI T/L spine  PT recommends LTC  Difficult dispo  Has family in the [place]    Elevated troponin- (present on admission)  Assessment & Plan  Chronically elevated. Baseline  - no further workup    Uncontrolled type 2 diabetes mellitus with hyperglycemia (HCC)- (present on admission)  Assessment & Plan  Persistent hyperglycemia  Re-start lantus 20 units qhs  ISS, consider adjsuting further  Cannot administer his own insulin    CKD (chronic kidney disease)- (present on admission)  Assessment & Plan  Remains stable, I personally reviewed the bmp on 1/10         VTE prophylaxis:    heparin ppx      I have performed a physical exam and reviewed and updated ROS and Plan today (1/10/2025). In review of yesterday's note (1/9/2025), there are no changes except as documented above.

## 2025-01-11 NOTE — PROGRESS NOTES
Report given to PENNIE Everett at 19:27. Pt left floor at 20:05 via [employer] with all belongings and meds. Pt AAOx4, sent via RA sat %.

## 2025-01-11 NOTE — PROGRESS NOTES
Hospital Medicine Daily Progress Note    Date of Service  1/11/2025    Chief Complaint  Christoph Taylor is a 59 y.o. male admitted 1/9/2025 with back pain    Hospital Course    59-year-old male with prior suicidal ideation, poorly controlled diabetes mellitus type 2, housing insecurity and severe protein calorie malnutrition with acute and chronic back pain presented to hospital generalized weakness and acute on chronic back pain. Patient states that he is unable to take care of himself and has a hard time administering insulin, buying food for himself, taking his blood sugars. Additionally says that he has worsening neuropathy of the feet and hands. His pain is worse than ever. He has no additional help here. In the ER he was found to be hyperglycemic and quite weak. Additionally there was concern for possible UTI and he was given IV Ancef and a one-time dose of oxycodone 5 mg.  MRI T-spine and L-spine has been ordered for further evaluation.  Need placement.   aware and assisting    Interval Problem Update    1/11/2025  Seen and examined bedside  Vitals remained stable, on room air  Labs with normal white count, hemoglobin 9.8, sodium 137 potassium 4.5, renal function stable, glucose 176, UA with moderate bacteria,  Chest x-ray reviewed no acute cardiopulmonary event  Chart reviewed, H&P reviewed  Meds reviewed    PLAN  MRI T-spine, MRI LS spine-ordered for evaluation of back pain  Monitor fingerstick closely   assisting with placement  Continue with pain management    I have discussed this patient's plan of care and discharge plan at IDT rounds today with Case Management, Nursing, Nursing leadership, and other members of the IDT team.      Code Status  Full Code    Disposition  The patient is not medically cleared for discharge to home or a post-acute facility.  Anticipate discharge to: skilled nursing facility    I have placed the appropriate orders for post-discharge needs.    Review  of Systems  Review of Systems   Constitutional:  Positive for malaise/fatigue.   Respiratory:  Negative for cough.    Cardiovascular:  Negative for chest pain.   Gastrointestinal:  Negative for nausea and vomiting.   Musculoskeletal:  Positive for joint pain.        Physical Exam  Temp:  [35.8 °C (96.4 °F)-36.9 °C (98.4 °F)] 36.9 °C (98.4 °F)  Pulse:  [70-81] 81  Resp:  [16-18] 18  BP: (106-128)/(63-86) 122/86  SpO2:  [95 %-100 %] 95 %    Physical Exam  Constitutional:       Appearance: He is ill-appearing.   Cardiovascular:      Rate and Rhythm: Normal rate.      Pulses: Normal pulses.   Pulmonary:      Effort: Pulmonary effort is normal.   Musculoskeletal:      Right lower leg: No edema.   Skin:     General: Skin is warm.   Neurological:      Mental Status: He is oriented to person, place, and time.      Comments: Overall generalized weakness, muscle strength 5 out of 5 all extremities, sensation intact   Psychiatric:         Mood and Affect: Mood normal.         Fluids    Intake/Output Summary (Last 24 hours) at 1/11/2025 1156  Last data filed at 1/11/2025 0830  Gross per 24 hour   Intake 360 ml   Output --   Net 360 ml        Laboratory  Recent Labs     01/09/25  1150 01/10/25  0008   WBC 5.2 5.1   RBC 3.59* 3.73*   HEMOGLOBIN 9.7* 9.8*   HEMATOCRIT 29.2* 30.2*   MCV 81.3* 81.0*   MCH 27.0 26.3*   MCHC 33.2 32.5   RDW 41.1 41.5   PLATELETCT 204 208   MPV 9.1 8.9*     Recent Labs     01/09/25  1150 01/10/25  0008 01/11/25  0233   SODIUM 133* 136 137   POTASSIUM 4.5 3.8 4.5   CHLORIDE 95* 97 99   CO2 30 32 29   GLUCOSE 429* 220* 176*   BUN 17 18 26*   CREATININE 0.65 0.86 0.57   CALCIUM 8.6 8.6 8.4*                   Imaging  DX-CHEST-PORTABLE (1 VIEW)   Final Result      No acute cardiopulmonary disease evident.      MR-LUMBAR SPINE-W/O    (Results Pending)   MR-THORACIC SPINE-W/O    (Results Pending)        Assessment/Plan  * Physical debility- (present on admission)  Assessment & Plan  WC cbound  No clear e/o  infection  Increase gabapentin and adjust multimodal pain therapy  CBC is normal, I personally reviewed cbc on 1/10    1/11/2025  MRI T-spine, MRI LS spine-ordered for evaluation of back pain  Monitor fingerstick closely   assisting with placement  Continue with pain management    Bacteriuria- (present on admission)  Assessment & Plan  No abx's for now given lack of symptoms    Acute hypoxic respiratory failure (HCC)- (present on admission)  Assessment & Plan  Slowly improving  CXR normal  Doubt PE  IS aggressive  Try to wean    Generalized weakness- (present on admission)  Assessment & Plan  FTT but worsening back pain  Get repeat MRI T/L spine  PT recommends LTC  Difficult dispo  Has family in the [place]    Elevated troponin- (present on admission)  Assessment & Plan  Chronically elevated. Baseline  - no further workup    Uncontrolled type 2 diabetes mellitus with hyperglycemia (HCC)- (present on admission)  Assessment & Plan  Persistent hyperglycemia  Re-start lantus 20 units qhs  ISS, consider adjsuting further  Cannot administer his own insulin    CKD (chronic kidney disease)- (present on admission)  Assessment & Plan  Remains stable, I personally reviewed the bmp on 1/10         VTE prophylaxis: lovenox    I have performed a physical exam and reviewed and updated ROS and Plan today (1/11/2025). In review of yesterday's note (1/10/2025), there are no changes except as documented above.         Greater than 52 minutes spent preparing to see patient (e.g. review of tests) obtaining and/or reviewing separately obtained history. Performing a medically appropriate examination and/ evaluation.  Counseling and educating the patient/family/caregiver.  Ordering medications, tests, or procedures.  Referring and communicating with other health care professionals.  Documenting clinical information in EPIC.  Independently interpreting results and communicating results to patient/family/caregiver.  Care  coordination.

## 2025-01-11 NOTE — CARE PLAN
Problem: Pain - Standard  Goal: Alleviation of pain or a reduction in pain to the patient’s comfort goal  Outcome: Progressing     Problem: Knowledge Deficit - Standard  Goal: Patient and family/care givers will demonstrate understanding of plan of care, disease process/condition, diagnostic tests and medications  Outcome: Progressing     Problem: Skin Integrity  Goal: Skin integrity is maintained or improved  Outcome: Progressing     Problem: Fall Risk  Goal: Patient will remain free from falls  Outcome: Progressing   The patient is Watcher - Medium risk of patient condition declining or worsening    Shift Goals  Clinical Goals: PT/OT, wound care, Pain onctrol, ACHS  Patient Goals: Sleep, Pain control, wound care  Family Goals: KATHERINE    Progress made toward(s) clinical / shift goals:  Patient is able to verbalize his plan of care.    Patient is not progressing towards the following goals: N/A

## 2025-01-11 NOTE — CARE PLAN
The patient is Stable - Low risk of patient condition declining or worsening    Shift Goals  Clinical Goals: PT/OT, blood glucose management  Patient Goals: sleep and pain control  Family Goals: KATHERINE    Progress made toward(s) clinical / shift goals:    Problem: Fall Risk  Goal: Patient will remain free from falls  Outcome: Progressing     Problem: Pain - Standard  Goal: Alleviation of pain or a reduction in pain to the patient’s comfort goal  Outcome: Progressing       Patient pain has been controlled during the shift. Pt has had no injury or fall during the shift. No questions or concerns at this time.

## 2025-01-11 NOTE — THERAPY
Physical Therapy Contact Note    Patient Name: Christoph Taylor  Age:  59 y.o., Sex:  male  Medical Record #: [id number]  Today's Date: 1/11/2025     Increasing weakness last week, decreased dexterity with upper extremities baseline, thoracic back pain and worsening last x4 days, prior back surgeries, High Blood Sugar. Dx's with hypoxia, Bacteriuria  Hx of recent admission with erosive esophagitis, hiatal hernia, DM2 with neuropathy, CKD, HLD, chronic lower back and neck pain, DKA,      01/11/25 1520   Interdisciplinary Plan of Care Collaboration   IDT Collaboration with  Nursing   Collaboration Comments Chart reviewed. Observed that pt is going to have an MRI of T/S and L/S due to current high pain. Pt is getting up to chair with nursing. Will hold eval until after MRI results. Pt is in agreement and RN notified.

## 2025-01-12 ENCOUNTER — APPOINTMENT (OUTPATIENT)
Dept: RADIOLOGY | Facility: MEDICAL CENTER | Age: 60
DRG: 542 | End: 2025-01-12
Attending: INTERNAL MEDICINE
Payer: COMMERCIAL

## 2025-01-12 ENCOUNTER — APPOINTMENT (OUTPATIENT)
Dept: RADIOLOGY | Facility: MEDICAL CENTER | Age: 60
End: 2025-01-12
Attending: INTERNAL MEDICINE
Payer: COMMERCIAL

## 2025-01-12 PROBLEM — S22.000A COMPRESSION FRACTURE OF BODY OF THORACIC VERTEBRA (HCC): Status: ACTIVE | Noted: 2024-05-20

## 2025-01-12 LAB
GLUCOSE BLD STRIP.AUTO-MCNC: 177 MG/DL (ref 65–99)
GLUCOSE BLD STRIP.AUTO-MCNC: 218 MG/DL (ref 65–99)
GLUCOSE BLD STRIP.AUTO-MCNC: 242 MG/DL (ref 65–99)

## 2025-01-12 PROCEDURE — A9270 NON-COVERED ITEM OR SERVICE: HCPCS

## 2025-01-12 PROCEDURE — 72146 MRI CHEST SPINE W/O DYE: CPT

## 2025-01-12 PROCEDURE — A9270 NON-COVERED ITEM OR SERVICE: HCPCS | Performed by: NURSE PRACTITIONER

## 2025-01-12 PROCEDURE — 700102 HCHG RX REV CODE 250 W/ 637 OVERRIDE(OP): Performed by: NURSE PRACTITIONER

## 2025-01-12 PROCEDURE — 82962 GLUCOSE BLOOD TEST: CPT | Mod: 91

## 2025-01-12 PROCEDURE — 700101 HCHG RX REV CODE 250: Performed by: NURSE PRACTITIONER

## 2025-01-12 PROCEDURE — 700111 HCHG RX REV CODE 636 W/ 250 OVERRIDE (IP): Performed by: STUDENT IN AN ORGANIZED HEALTH CARE EDUCATION/TRAINING PROGRAM

## 2025-01-12 PROCEDURE — A9270 NON-COVERED ITEM OR SERVICE: HCPCS | Performed by: INTERNAL MEDICINE

## 2025-01-12 PROCEDURE — 770006 HCHG ROOM/CARE - MED/SURG/GYN SEMI*

## 2025-01-12 PROCEDURE — 72148 MRI LUMBAR SPINE W/O DYE: CPT

## 2025-01-12 PROCEDURE — 700111 HCHG RX REV CODE 636 W/ 250 OVERRIDE (IP): Mod: JZ | Performed by: INTERNAL MEDICINE

## 2025-01-12 PROCEDURE — 700102 HCHG RX REV CODE 250 W/ 637 OVERRIDE(OP): Performed by: INTERNAL MEDICINE

## 2025-01-12 PROCEDURE — 99233 SBSQ HOSP IP/OBS HIGH 50: CPT | Performed by: STUDENT IN AN ORGANIZED HEALTH CARE EDUCATION/TRAINING PROGRAM

## 2025-01-12 PROCEDURE — 700102 HCHG RX REV CODE 250 W/ 637 OVERRIDE(OP)

## 2025-01-12 RX ADMIN — INSULIN LISPRO 2 UNITS: 100 INJECTION, SOLUTION INTRAVENOUS; SUBCUTANEOUS at 18:05

## 2025-01-12 RX ADMIN — GABAPENTIN 200 MG: 100 CAPSULE ORAL at 06:08

## 2025-01-12 RX ADMIN — INSULIN GLARGINE-YFGN 20 UNITS: 100 INJECTION, SOLUTION SUBCUTANEOUS at 18:04

## 2025-01-12 RX ADMIN — ENOXAPARIN SODIUM 40 MG: 100 INJECTION SUBCUTANEOUS at 18:00

## 2025-01-12 RX ADMIN — INSULIN LISPRO 1 UNITS: 100 INJECTION, SOLUTION INTRAVENOUS; SUBCUTANEOUS at 12:31

## 2025-01-12 RX ADMIN — GABAPENTIN 200 MG: 100 CAPSULE ORAL at 12:33

## 2025-01-12 RX ADMIN — LIDOCAINE 3 PATCH: 4 PATCH TOPICAL at 16:14

## 2025-01-12 RX ADMIN — GABAPENTIN 200 MG: 100 CAPSULE ORAL at 18:00

## 2025-01-12 RX ADMIN — OXYCODONE 5 MG: 5 TABLET ORAL at 02:35

## 2025-01-12 RX ADMIN — LORAZEPAM 1 MG: 2 INJECTION INTRAMUSCULAR; INTRAVENOUS at 09:06

## 2025-01-12 RX ADMIN — INSULIN LISPRO 1 UNITS: 100 INJECTION, SOLUTION INTRAVENOUS; SUBCUTANEOUS at 21:32

## 2025-01-12 RX ADMIN — INSULIN LISPRO 2 UNITS: 100 INJECTION, SOLUTION INTRAVENOUS; SUBCUTANEOUS at 08:24

## 2025-01-12 RX ADMIN — VENLAFAXINE 37.5 MG: 75 TABLET ORAL at 06:09

## 2025-01-12 RX ADMIN — OXYCODONE 5 MG: 5 TABLET ORAL at 16:14

## 2025-01-12 RX ADMIN — OMEPRAZOLE 40 MG: 20 CAPSULE, DELAYED RELEASE ORAL at 18:00

## 2025-01-12 RX ADMIN — OMEPRAZOLE 40 MG: 20 CAPSULE, DELAYED RELEASE ORAL at 06:08

## 2025-01-12 ASSESSMENT — ENCOUNTER SYMPTOMS
NAUSEA: 0
COUGH: 0
VOMITING: 0

## 2025-01-12 ASSESSMENT — PAIN DESCRIPTION - PAIN TYPE
TYPE: CHRONIC PAIN
TYPE: ACUTE PAIN

## 2025-01-12 ASSESSMENT — PAIN SCALES - WONG BAKER: WONGBAKER_NUMERICALRESPONSE: DOESN'T HURT AT ALL

## 2025-01-12 NOTE — PROGRESS NOTES
4 Eyes Skin Assessment Completed by PENNIE Velásquez and PENNIE Hernandez.    Head WDL  Ears WDL  Nose WDL  Mouth WDL  Neck Redness and Blanching  Breast/Chest WDL  Shoulder Blades WDL  Spine WDL  small surgical scar to lower spine  (R) Arm/Elbow/Hand Redness, Scab, and Swelling small scab to thumb, middle finger and hand between fingers  (L) Arm/Elbow/Hand Scab  Abdomen WDL  Groin WDL  Scrotum/Coccyx/Buttocks WDL  (R) Leg Redness and Blanching abrasion to knee, dressing in place  (L) Leg Redness and Blanching abrasion to knee, dressing in place  (R) Heel/Foot/Toe Redness, Blanching, and Scab scabs to 2nd and 3rd toe  (L) Heel/Foot/Toe Redness and Blanching abrasion to 2nd toe, dressing in place          Devices In Places PIV      Interventions In Place Heel Mepilex, Sacral Mepilex, Pillows, Low Air Loss Mattress, and Dri-Richar Pads    Possible Skin Injury Yes    Pictures Uploaded Into Epic Yes  Wound Consult Placed N/A pt seen by wound care yesterday  RN Wound Prevention Protocol Ordered No  interventions in place

## 2025-01-12 NOTE — PROGRESS NOTES
Hospital Medicine Daily Progress Note    Date of Service  1/12/2025    Chief Complaint  Christoph Taylor is a 59 y.o. male admitted 1/9/2025 with back pain    Hospital Course    59-year-old male with prior suicidal ideation, poorly controlled diabetes mellitus type 2, housing insecurity and severe protein calorie malnutrition with acute and chronic back pain presented to hospital generalized weakness and acute on chronic back pain. Patient states that he is unable to take care of himself and has a hard time administering insulin, buying food for himself, taking his blood sugars. Additionally says that he has worsening neuropathy of the feet and hands. His pain is worse than ever. He has no additional help here. In the ER he was found to be hyperglycemic and quite weak. Additionally there was concern for possible UTI and he was given IV Ancef and a one-time dose of oxycodone 5 mg.  MRI T-spine and L-spine has been ordered for further evaluation.  Need placement.   aware and assisting    Interval Problem Update    1/12/2025  Seen and examined at bedside  Vitals remained stable  Pain well-managed  No change in bowel and productivity  Chart reviewed  MRI T-spine with T12 old compression fracture, T2 old superior endplate compression fracture, multiple level disc bulges T3-4 T7-T8 T8-T9, T11-T12, variable mass effect with out  central stenosis or cord impingement, MRI L-spine with T12 compression fracture, previous L4-L5 laminectomy, multilevel degenerative changes.    PLAN    Requiring  narcotics for pain management.  Monitor for toxicity   assisting with placement  Neurosurgery Dr. Valentine consulted .  MRI finding discussed with neurosurgery Dr. Bear in details.  Recommended nonoperative management, and outpatient follow-up with epidural injection.    I have discussed this patient's plan of care and discharge plan at IDT rounds today with Case Management, Nursing, Nursing leadership,  and other members of the IDT team.      Code Status  Full Code    Disposition  The patient is not medically cleared for discharge to home or a post-acute facility.  Anticipate discharge to: home with close outpatient follow-up    I have placed the appropriate orders for post-discharge needs.    Review of Systems  Review of Systems   Constitutional:  Positive for malaise/fatigue.   Respiratory:  Negative for cough.    Cardiovascular:  Negative for chest pain.   Gastrointestinal:  Negative for nausea and vomiting.   Musculoskeletal:  Positive for joint pain.        Physical Exam  Temp:  [36.3 °C (97.3 °F)-36.7 °C (98 °F)] 36.4 °C (97.6 °F)  Pulse:  [79-91] 91  Resp:  [16-18] 18  BP: (103-132)/(61-83) 104/61  SpO2:  [92 %-95 %] 93 %    Physical Exam  Constitutional:       Appearance: He is ill-appearing.   Cardiovascular:      Rate and Rhythm: Normal rate.      Pulses: Normal pulses.   Pulmonary:      Effort: Pulmonary effort is normal.   Musculoskeletal:      Right lower leg: No edema.   Skin:     General: Skin is warm.   Neurological:      Mental Status: He is oriented to person, place, and time.      Comments: Overall generalized weakness, muscle strength 5 out of 5 all extremities, sensation intact   Psychiatric:         Mood and Affect: Mood normal.         Fluids    Intake/Output Summary (Last 24 hours) at 1/12/2025 1507  Last data filed at 1/12/2025 1005  Gross per 24 hour   Intake 720 ml   Output 600 ml   Net 120 ml        Laboratory  Recent Labs     01/10/25  0008   WBC 5.1   RBC 3.73*   HEMOGLOBIN 9.8*   HEMATOCRIT 30.2*   MCV 81.0*   MCH 26.3*   MCHC 32.5   RDW 41.5   PLATELETCT 208   MPV 8.9*     Recent Labs     01/10/25  0008 01/11/25  0233   SODIUM 136 137   POTASSIUM 3.8 4.5   CHLORIDE 97 99   CO2 32 29   GLUCOSE 220* 176*   BUN 18 26*   CREATININE 0.86 0.57   CALCIUM 8.6 8.4*                   Imaging  MR-LUMBAR SPINE-W/O   Final Result      1.  T12 chronic compression fracture with anterior wedge  deformity.   2.  Postoperative changes with interval L4-5 laminectomy since prior MRI study. There is now laminectomy at L4-5-S1 and there is now posterior fusion with transpedicular screw fixation at L4-L5. Relief of previously seen severe central stenosis at L4-5.   3.  Multilevel degenerative changes as detailed for each level above in the body of report.      MR-THORACIC SPINE-W/O   Final Result      1.  T12 old compression fracture with anterior wedge deformity. Associated mild lower thoracic kyphosis.   2.  T2 old superior endplate compression fracture.   3.  Multilevel disc bulges T3-T4, T7-T8, T8-T9, T11-12, T12-L1. Variable mass effect on the ventral thecal sac without quinton central stenosis or cord impingement at any thoracic level.   4.  No myelopathic cord signal at any thoracic level.      DX-CHEST-PORTABLE (1 VIEW)   Final Result      No acute cardiopulmonary disease evident.           Assessment/Plan  * Physical debility- (present on admission)  Assessment & Plan  WC bound  No clear e/o infection  Increase gabapentin and adjust multimodal pain therapy  CBC is normal, I personally reviewed cbc on 1/10    1/12/2025    MRI with compression fracture  Neurosurgery recommended nonoperative management  Follow-up for epidural injection as outpatient    Bacteriuria- (present on admission)  Assessment & Plan  No abx's for now given lack of symptoms    Acute hypoxic respiratory failure (HCC)- (present on admission)  Assessment & Plan  Slowly improving  CXR normal  Doubt PE  IS aggressive  Try to wean    Compression fracture of body of thoracic vertebra (HCC)- (present on admission)  Assessment & Plan    1/12/2025    MRI T-spine with T12 old compression fracture, T2 old superior endplate compression fracture, multiple level disc bulges T3-4 T7-T8 T8-T9, T11-T12, variable mass effect with out  central stenosis or cord impingement, MRI L-spine with T12 compression fracture, previous L4-L5 laminectomy, multilevel  degenerative changes.    Neurosurgery Dr. Valentine consulted .  MRI finding discussed with neurosurgery Dr. Bear in details.  Recommended nonoperative management, and outpatient follow-up with epidural injection.    Elevated troponin- (present on admission)  Assessment & Plan  Chronically elevated. Baseline  - no further workup    Uncontrolled type 2 diabetes mellitus with hyperglycemia (HCC)- (present on admission)  Assessment & Plan  Persistent hyperglycemia  Re-start lantus 20 units qhs  ISS, consider adjsuting further  Cannot administer his own insulin    CKD (chronic kidney disease)- (present on admission)  Assessment & Plan  Remains stable, I personally reviewed the bmp on 1/10         VTE prophylaxis: lovenox    I have performed a physical exam and reviewed and updated ROS and Plan today (1/12/2025). In review of yesterday's note (1/11/2025), there are no changes except as documented above.            Greater than 52 minutes spent preparing to see patient (e.g. review of tests) obtaining and/or reviewing separately obtained history. Performing a medically appropriate examination and/ evaluation.  Counseling and educating the patient/family/caregiver.  Ordering medications, tests, or procedures.  Referring and communicating with other health care professionals.  Documenting clinical information in EPIC.  Independently interpreting results and communicating results to patient/family/caregiver.  Care coordination.  Case discussed with neurosurgery in details.

## 2025-01-12 NOTE — CARE PLAN
The patient is Stable - Low risk of patient condition declining or worsening    Shift Goals  Clinical Goals: control pain, MRI complete  Patient Goals: rest and pain control  Family Goals: KATHERINE    Progress made toward(s) clinical / shift goals:    Problem: Fall Risk  Goal: Patient will remain free from falls  Outcome: Progressing     Problem: Pain - Standard  Goal: Alleviation of pain or a reduction in pain to the patient’s comfort goal  Outcome: Progressing       Patient has had no falls or injuries during the shift. Pain has been managed during the shift. No questions or concerns at this time.

## 2025-01-12 NOTE — CARE PLAN
The patient is Stable - Low risk of patient condition declining or worsening    Shift Goals  Clinical Goals: Control pain, MRI this shift. maintain patient safety  Patient Goals: Rest, pain control  Family Goals: KATHERINE    Progress made toward(s) clinical / shift goals:    Problem: Pain - Standard  Goal: Alleviation of pain or a reduction in pain to the patient’s comfort goal  Outcome: Progressing     Problem: Knowledge Deficit - Standard  Goal: Patient and family/care givers will demonstrate understanding of plan of care, disease process/condition, diagnostic tests and medications  Outcome: Progressing     Problem: Skin Integrity  Goal: Skin integrity is maintained or improved  Outcome: Progressing     Problem: Fall Risk  Goal: Patient will remain free from falls  Outcome: Progressing       Patient is not progressing towards the following goals:

## 2025-01-13 LAB
GLUCOSE BLD STRIP.AUTO-MCNC: 114 MG/DL (ref 65–99)
GLUCOSE BLD STRIP.AUTO-MCNC: 165 MG/DL (ref 65–99)
GLUCOSE BLD STRIP.AUTO-MCNC: 165 MG/DL (ref 65–99)
GLUCOSE BLD STRIP.AUTO-MCNC: 168 MG/DL (ref 65–99)
GLUCOSE BLD STRIP.AUTO-MCNC: 263 MG/DL (ref 65–99)

## 2025-01-13 PROCEDURE — 82962 GLUCOSE BLOOD TEST: CPT | Mod: 91

## 2025-01-13 PROCEDURE — 99232 SBSQ HOSP IP/OBS MODERATE 35: CPT | Performed by: STUDENT IN AN ORGANIZED HEALTH CARE EDUCATION/TRAINING PROGRAM

## 2025-01-13 PROCEDURE — 700102 HCHG RX REV CODE 250 W/ 637 OVERRIDE(OP): Performed by: NURSE PRACTITIONER

## 2025-01-13 PROCEDURE — 700101 HCHG RX REV CODE 250: Performed by: NURSE PRACTITIONER

## 2025-01-13 PROCEDURE — A9270 NON-COVERED ITEM OR SERVICE: HCPCS | Performed by: NURSE PRACTITIONER

## 2025-01-13 PROCEDURE — A9270 NON-COVERED ITEM OR SERVICE: HCPCS

## 2025-01-13 PROCEDURE — 700102 HCHG RX REV CODE 250 W/ 637 OVERRIDE(OP): Performed by: INTERNAL MEDICINE

## 2025-01-13 PROCEDURE — 97163 PT EVAL HIGH COMPLEX 45 MIN: CPT

## 2025-01-13 PROCEDURE — A9270 NON-COVERED ITEM OR SERVICE: HCPCS | Performed by: INTERNAL MEDICINE

## 2025-01-13 PROCEDURE — 700111 HCHG RX REV CODE 636 W/ 250 OVERRIDE (IP): Mod: JZ | Performed by: INTERNAL MEDICINE

## 2025-01-13 PROCEDURE — 97167 OT EVAL HIGH COMPLEX 60 MIN: CPT

## 2025-01-13 PROCEDURE — 700102 HCHG RX REV CODE 250 W/ 637 OVERRIDE(OP)

## 2025-01-13 PROCEDURE — 770006 HCHG ROOM/CARE - MED/SURG/GYN SEMI*

## 2025-01-13 RX ADMIN — INSULIN LISPRO 1 UNITS: 100 INJECTION, SOLUTION INTRAVENOUS; SUBCUTANEOUS at 11:55

## 2025-01-13 RX ADMIN — OMEPRAZOLE 40 MG: 20 CAPSULE, DELAYED RELEASE ORAL at 17:29

## 2025-01-13 RX ADMIN — OMEPRAZOLE 40 MG: 20 CAPSULE, DELAYED RELEASE ORAL at 04:27

## 2025-01-13 RX ADMIN — ENOXAPARIN SODIUM 40 MG: 100 INJECTION SUBCUTANEOUS at 17:31

## 2025-01-13 RX ADMIN — INSULIN LISPRO 1 UNITS: 100 INJECTION, SOLUTION INTRAVENOUS; SUBCUTANEOUS at 21:12

## 2025-01-13 RX ADMIN — VENLAFAXINE 37.5 MG: 75 TABLET ORAL at 04:27

## 2025-01-13 RX ADMIN — OXYCODONE 5 MG: 5 TABLET ORAL at 11:51

## 2025-01-13 RX ADMIN — LIDOCAINE 3 PATCH: 4 PATCH TOPICAL at 17:30

## 2025-01-13 RX ADMIN — GABAPENTIN 200 MG: 100 CAPSULE ORAL at 11:51

## 2025-01-13 RX ADMIN — GABAPENTIN 200 MG: 100 CAPSULE ORAL at 04:27

## 2025-01-13 RX ADMIN — OXYCODONE 5 MG: 5 TABLET ORAL at 17:39

## 2025-01-13 RX ADMIN — INSULIN GLARGINE-YFGN 20 UNITS: 100 INJECTION, SOLUTION SUBCUTANEOUS at 17:30

## 2025-01-13 RX ADMIN — GABAPENTIN 200 MG: 100 CAPSULE ORAL at 17:29

## 2025-01-13 RX ADMIN — OXYCODONE 5 MG: 5 TABLET ORAL at 04:27

## 2025-01-13 RX ADMIN — INSULIN LISPRO 3 UNITS: 100 INJECTION, SOLUTION INTRAVENOUS; SUBCUTANEOUS at 17:29

## 2025-01-13 ASSESSMENT — COGNITIVE AND FUNCTIONAL STATUS - GENERAL
SUGGESTED CMS G CODE MODIFIER MOBILITY: CL
MOVING FROM LYING ON BACK TO SITTING ON SIDE OF FLAT BED: A LITTLE
PERSONAL GROOMING: A LITTLE
DAILY ACTIVITIY SCORE: 15
DRESSING REGULAR UPPER BODY CLOTHING: A LITTLE
WALKING IN HOSPITAL ROOM: TOTAL
STANDING UP FROM CHAIR USING ARMS: A LITTLE
HELP NEEDED FOR BATHING: A LOT
MOVING TO AND FROM BED TO CHAIR: A LITTLE
TOILETING: A LOT
MOBILITY SCORE: 14
SUGGESTED CMS G CODE MODIFIER DAILY ACTIVITY: CK
CLIMB 3 TO 5 STEPS WITH RAILING: TOTAL
TURNING FROM BACK TO SIDE WHILE IN FLAT BAD: A LITTLE
DRESSING REGULAR LOWER BODY CLOTHING: A LOT
EATING MEALS: A LITTLE

## 2025-01-13 ASSESSMENT — PAIN DESCRIPTION - PAIN TYPE
TYPE: ACUTE PAIN
TYPE: ACUTE PAIN;CHRONIC PAIN
TYPE: ACUTE PAIN
TYPE: ACUTE PAIN

## 2025-01-13 ASSESSMENT — ENCOUNTER SYMPTOMS
VOMITING: 0
COUGH: 0
NAUSEA: 0

## 2025-01-13 ASSESSMENT — ACTIVITIES OF DAILY LIVING (ADL): TOILETING: INDEPENDENT

## 2025-01-13 ASSESSMENT — GAIT ASSESSMENTS: GAIT LEVEL OF ASSIST: UNABLE TO PARTICIPATE

## 2025-01-13 NOTE — THERAPY
Occupational Therapy   Initial Evaluation     Patient Name: Christoph Taylor  Age:  59 y.o., Sex:  male  Medical Record #: [id number]  Today's Date: 1/13/2025     Precautions  Precautions: (P) Fall Risk    Assessment    Patient is 59 y.o. male admitted with back pain, inability to care for self, hyperglycemia, MRI with T12 old compression fx, T2 compression fx, multiple level of disc bulges. Pt states normally independent with mobility only able to pivot to/from wheelchair, unable to shower due to set up needing to take multiple steps to get into shower, and increased difficulty with all ADLs due to finger contractures, pt unable to fully extend fingers or manipulate small objects with flexion due to chronic deficits. Pt would benefit from continued skilled therapy while admitted as well as recommend post-acute placement.      Plan    Occupational Therapy Initial Treatment Plan   Treatment Interventions: (P) Self Care / Activities of Daily Living, Adaptive Equipment, Community Re-Integration, Manual Therapy Techniques, Therapeutic Exercises, Neuro Re-Education / Balance, Therapeutic Activity, Family / Caregiver Training  Treatment Frequency: (P) 3 Times per Week  Duration: (P) Until Therapy Goals Met    DC Equipment Recommendations: (P) Unable to determine at this time  Discharge Recommendations: (P) Recommend post-acute placement for additional occupational therapy services prior to discharge home     Objective       01/13/25 1116   Prior Living Situation   Prior Services None   Housing / Facility 1 Story House   Steps Into Home 5   Steps In Home 0   Rail Both Rail (Steps into Home)   Bathroom Set up Walk In Shower;Grab Bars;Shower Chair   Equipment Owned Front-Wheel Walker;Wheelchair   Lives with - Patient's Self Care Capacity Alone and Unable to Care For Self   Comments only transferring to wheelchair and back has to crawl to get groceries from porch   Prior Level of ADL Function   Self Feeding Independent    Grooming / Hygiene Independent   Bathing Independent   Dressing Independent  (difficulty due to finger contractures)   Toileting Independent  (difficulty due to finger contractures)   Prior Level of IADL Function   Medication Management Requires Assist   Laundry Requires Assist   Kitchen Mobility Requires Assist   Finances Requires Assist   Home Management Requires Assist   Shopping Requires Assist   Prior Level Of Mobility Uses Wheel Chair for in Home Mobility   Occupation (Pre-Hospital Vocational) Retired Due To Age   History of Falls   History of Falls Yes   Date of Last Fall   (extensive history of falls)   Precautions   Precautions Fall Risk   Vitals   O2 (LPM) 0   O2 Delivery Device None - Room Air   Pain 0 - 10 Group   Therapist Pain Assessment Post Activity Pain Same as Prior to Activity;Nurse Notified   Cognition    Cognition / Consciousness WDL   Comments pleasant, cooperative, aware of deficits and inability to care for self at home   Active ROM Upper Body   Active ROM Upper Body  X   Dominant Hand Right   Rt Hand Severe Impaired   ROM Lt Hand Severe Impaired   Strength Upper Body   Upper Body Strength  X   Gross Strength Generalized Weakness, Equal Bilaterally.    Balance Assessment   Sitting Balance (Static) Fair   Sitting Balance (Dynamic) Fair   Standing Balance (Static) Fair -   Standing Balance (Dynamic) Poor   Weight Shift Sitting Fair   Weight Shift Standing Fair   Comments w/ HHA   Bed Mobility    Supine to Sit Supervised   Scooting Supervised   Rolling Supervised   Comments transferred to chair   ADL Assessment   Grooming Contact Guard Assist;Seated   Upper Body Dressing Minimal Assist   Lower Body Dressing Maximal Assist   Toileting   (NT-refused need)   How much help from another person does the patient currently need...   Putting on and taking off regular lower body clothing? 2   Bathing (including washing, rinsing, and drying)? 2   Toileting, which includes using a toilet, bedpan, or  urinal? 2   Putting on and taking off regular upper body clothing? 3   Taking care of personal grooming such as brushing teeth? 3   Eating meals? 3   6 Clicks Daily Activity Score 15   Functional Mobility   Sit to Stand Contact Guard Assist   Bed, Chair, Wheelchair Transfer Contact Guard Assist   Transfer Method Stand Pivot   Mobility bed mobility, transfer to chair   Comments w/ HHA   Activity Tolerance   Sitting in Chair left seated in chair   Sitting Edge of Bed 8 min   Standing 3 min   Short Term Goals   Short Term Goal # 1 Pt will complete ADL txfs with supv   Short Term Goal # 2 Pt will complete LB dressing with supv using AE PRN   Short Term Goal # 3 Pt will complete toileting ADLs with supv   Education Group   Education Provided Role of Occupational Therapist   Role of Occupational Therapist Patient Response Patient;Acceptance;Explanation;Verbal Demonstration   Occupational Therapy Initial Treatment Plan    Treatment Interventions Self Care / Activities of Daily Living;Adaptive Equipment;Community Re-Integration;Manual Therapy Techniques;Therapeutic Exercises;Neuro Re-Education / Balance;Therapeutic Activity;Family / Caregiver Training   Treatment Frequency 3 Times per Week   Duration Until Therapy Goals Met   Problem List   Problem List Decreased Active Daily Living Skills;Decreased Homemaking Skills;Decreased Upper Extremity Strength Right;Decreased Upper Extremity Strength Left;Decreased Upper Extremity AROM Right;Decreased Upper Extremity AROM Left;Decreased Functional Mobility;Decreased Activity Tolerance;Decreased Upper Extremity PROM Left;Decreased Upper Extremity PROM Right;Impaired Postural Control / Balance;Impaired Posture / Trunk Alignment   Anticipated Discharge Equipment and Recommendations   DC Equipment Recommendations Unable to determine at this time   Discharge Recommendations Recommend post-acute placement for additional occupational therapy services prior to discharge home    Interdisciplinary Plan of Care Collaboration   IDT Collaboration with  Nursing;Physical Therapist   Patient Position at End of Therapy Seated;Chair Alarm On;Call Light within Reach;Tray Table within Reach;Phone within Reach   Collaboration Comments RN updated

## 2025-01-13 NOTE — PROGRESS NOTES
4 Eyes Skin Assessment Completed by PENNIE Logan and PENNIE Castañeda.    Head WDL  Ears WDL  Nose WDL  Mouth WDL  Neck WDL  Breast/Chest WDL  Shoulder Blades WDL  Spine scars along midline, closed  (R) Arm/Elbow/Hand Scab and Swelling dressing in place on hand  (L) Arm/Elbow/Hand Scab dressing in place  Abdomen WDL  Groin Excoriation  Scrotum/Coccyx/Buttocks Redness and Blanching  (R) Leg Scar and Scab diffuse scarring and scabbing, dressing in place  (L) Leg Scar and Scab dressing in place  (R) Heel/Foot/Toe blanching redness on heel, offloading dressing in place  (L) Heel/Foot/Toe Redness and Blanching, blanching redness on heel, off-lading dressing in place.  Toe dressing in place          Devices In Places Blood Pressure Cuff      Interventions In Place Heel Mepilex, Sacral Mepilex, TAP System, and Heels Loaded W/Pillows    Possible Skin Injury No    Pictures Uploaded Into Epic Yes  Wound Consult Placed N/A  RN Wound Prevention Protocol Ordered No

## 2025-01-13 NOTE — THERAPY
"Physical Therapy   Initial Evaluation     Patient Name: Christoph Taylor  Age:  59 y.o., Sex:  male  Medical Record #: [id number]  Today's Date: 1/13/2025     Precautions  Precautions: Fall Risk    Assessment  Patient is a 59 y.o. male who was admitted with back pain, inability to care for self, hyperglycemia. MRI showed old T12 compression fx, T2 compression fx, multiple levels of disc bulging. PMH significant for diabetes, CKD, multiple spine surgeries.    Pt received in bed and agreeable to PT evaluation. Pt presents with significant functional deficits that affect his ability to be independent and care for himself at home, including B hand ROM and strength deficits, LE strength deficits, impaired balance, chronic back pain, and history of repeated falls. Pt was able to demonstrate a stand pivot transfer to a wheelchair with CGA this session, but is unable to ambulate to access parts of his home. Pt reports recently needing to crawl to get groceries from the porch, inability to get in/out of the shower, and inability to leave his home due to stairs. Pt would benefit from post-acute placement. Will follow for acute PT.    Plan    Physical Therapy Initial Treatment Plan   Treatment Plan : Bed Mobility, Equipment, Gait Training, Neuro Re-Education / Balance, Self Care / Home Evaluation, Therapeutic Activities, Therapeutic Exercise  Treatment Frequency: 2 Times per Week  Duration: Until Therapy Goals Met    DC Equipment Recommendations: Unable to determine at this time  Discharge Recommendations: Recommend post-acute placement for additional physical therapy services prior to discharge home       Subjective    \"I can't take care of myself like this anymore. I had to crawl to get my groceries. I keep burning myself getting things out of the microwave. I can't get in the shower.\"     Objective       01/13/25 1113   Precautions   Precautions Fall Risk   Vitals   O2 Delivery Device None - Room Air   Pain 0 - 10 Group "   Therapist Pain Assessment Post Activity Pain Same as Prior to Activity;Nurse Notified  (back pain, not rated)   Prior Living Situation   Prior Services None   Housing / Facility 1 Story House   Steps Into Home 5   Steps In Home 0   Rail Both Rail (Steps into Home)   Equipment Owned Front-Wheel Walker;Wheelchair   Lives with - Patient's Self Care Capacity Alone and Unable to Care For Self   Comments Pt reports no assist available at home. Has been crawling at times to get to places that the wheelchair can't access.   Prior Level of Functional Mobility   Bed Mobility Independent   Transfer Status Independent   Ambulation Required Assist   Wheelchair Independent   Stairs Dependent   Comments Reports using a wheelchair only, crawls at times if wheelchair can't get him somewhere. Dependently carried up/down the stairs by EMS per pt.   History of Falls   History of Falls Yes   Date of Last Fall   (hx repeated falls)   Cognition    Comments Pleasant and cooperative, aware of inability to care for self at home and need for help at this point   Passive ROM Lower Body   Passive ROM Lower Body WDL   Comments Pending AFO for left foot with collapsed arch   Active ROM Lower Body    Active ROM Lower Body  WDL   Strength Lower Body   Comments BLE generalized weakness, functional for transfers, buckling with attempts to take steps or ambulate   Sensation Lower Body   Comments neuropathy in BLE, unchanged from baseline per pt   Balance Assessment   Sitting Balance (Static) Fair   Sitting Balance (Dynamic) Fair   Standing Balance (Static) Fair -   Standing Balance (Dynamic) Poor   Weight Shift Sitting Fair   Weight Shift Standing Fair   Comments HHA   Bed Mobility    Supine to Sit Supervised   Scooting Supervised   Rolling Supervised   Comments up to chair   Gait Analysis   Gait Level Of Assist Unable to Participate   Functional Mobility   Sit to Stand Contact Guard Assist   Bed, Chair, Wheelchair Transfer Contact Guard Assist    Transfer Method Stand Pivot   Mobility bed>chair   Comments Pt able to reach to pivot over to a chair with extra time. Declined any further mobility due to hx repeated falls, worried about LE buckling.   6 Clicks Assessment - How much HELP from from another person do you currently need... (If the patient hasn't done an activity recently, how much help from another person do you think he/she would need if he/she tried?)   Turning from your back to your side while in a flat bed without using bedrails? 3   Moving from lying on your back to sitting on the side of a flat bed without using bedrails? 3   Moving to and from a bed to a chair (including a wheelchair)? 3   Standing up from a chair using your arms (e.g., wheelchair, or bedside chair)? 3   Walking in hospital room? 1   Climbing 3-5 steps with a railing? 1   6 clicks Mobility Score 14   Short Term Goals    Short Term Goal # 1 Pt will complete complete transfer to a wheelchair with supervision to progress independence in 6 visits.   Short Term Goal # 2 Pt will propel manual wheelchair 100ft with supervision to progress independence in 6 visits.   Short Term Goal # 3 Pt will ambulate 10ft with FWW and supervision to access home in 6 visits.   Physical Therapy Initial Treatment Plan    Treatment Plan  Bed Mobility;Equipment;Gait Training;Neuro Re-Education / Balance;Self Care / Home Evaluation;Therapeutic Activities;Therapeutic Exercise   Treatment Frequency 2 Times per Week   Duration Until Therapy Goals Met   Problem List    Problems Impaired Transfers;Impaired Ambulation;Functional Strength Deficit;Impaired Balance;Decreased Activity Tolerance   Anticipated Discharge Equipment and Recommendations   DC Equipment Recommendations Unable to determine at this time   Discharge Recommendations Recommend post-acute placement for additional physical therapy services prior to discharge home   Interdisciplinary Plan of Care Collaboration   IDT Collaboration with   Nursing;Occupational Therapist   Patient Position at End of Therapy Seated;Chair Alarm On;Call Light within Reach;Tray Table within Reach;Phone within Reach   Collaboration Comments RN updated   Session Information   Date / Session Number  1/13-1 (1/2, 1/19)

## 2025-01-13 NOTE — CARE PLAN
The patient is Stable - Low risk of patient condition declining or worsening    Shift Goals  Clinical Goals: Patient skin integrity will be maintained or improved by end of shift  Patient Goals: rest, pain management  Family Goals: vicente    Patient A&Ox4, has not utilized call light appropriately.  BS monitored and managed with appropriate interventions.  Bed in lo wand locked position, call light in reach, rounded on hourly.       Progress made toward(s) clinical / shift goals:    Problem: Pain - Standard  Goal: Alleviation of pain or a reduction in pain to the patient’s comfort goal  Outcome: Progressing     Problem: Knowledge Deficit - Standard  Goal: Patient and family/care givers will demonstrate understanding of plan of care, disease process/condition, diagnostic tests and medications  Outcome: Progressing       Patient is not progressing towards the following goals:

## 2025-01-13 NOTE — CARE PLAN
The patient is Stable - Low risk of patient condition declining or worsening    Shift Goals  Clinical Goals: control pain, MRI complete  Patient Goals: rest and pain control  Family Goals: KATHERINE    Progress made toward(s) clinical / shift goals:  MRI completed      Problem: Skin Integrity  Goal: Skin integrity is maintained or improved  Description: Target End Date:  Prior to discharge or change in level of care    Document interventions on Skin Risk/Oscar flowsheet groups and corresponding LDA    1.  Assess and monitor skin integrity, appearance and/or temperature  2.  Assess risk factors for impaired skin integrity and/or pressures ulcers  3.  Implement precautions to protect skin integrity in collaboration with interdisciplinary team  4.  Implement pressure ulcer prevention protocol if at risk for skin breakdown  5.  Confirm wound care consult if at risk for skin breakdown  6.  Ensure patient use of pressure relieving devices  (Low air loss bed, waffle overlay, heel protectors, ROHO cushion, etc)  Outcome: Progressing     Problem: Fall Risk  Goal: Patient will remain free from falls  Description: Target End Date:  Prior to discharge or change in level of care    Document interventions on the Lizette Marroquin Fall Risk Assessment    1.  Assess for fall risk factors  2.  Implement fall precautions  Outcome: Progressing       Patient is not progressing towards the following goals:

## 2025-01-13 NOTE — PROGRESS NOTES
4 Eyes Skin Assessment Completed by PENNIE Shields and PENNIE Way.    Head WDL  Ears WDL  Nose WDL  Mouth WDL  Neck WDL  Breast/Chest WDL  Shoulder Blades WDL  Spine WDL  (R) Arm/Elbow/Hand Scab and Swelling, dressing intact  (L) Arm/Elbow/Hand Scab, dressing intact  Abdomen WDL  Groin WDL  Scrotum/Coccyx/Buttocks WDL  (R) Leg Redness, dressing intact  (L) Leg Redness, dressing intact  (R) Heel/Foot/Toe Redness and Scab  (L) Heel/Foot/Toe Redness, dressing in place      Devices In Places PIV    Interventions In Place Heel Mepilex, Sacral Mepilex, Pillows, and Dri-Richar Pads    Possible Skin Injury No    Pictures Uploaded Into Epic N/A  Wound Consult Placed N/A  RN Wound Prevention Protocol Ordered No

## 2025-01-14 LAB
BACTERIA BLD CULT: NORMAL
BACTERIA BLD CULT: NORMAL
GLUCOSE BLD STRIP.AUTO-MCNC: 218 MG/DL (ref 65–99)
GLUCOSE BLD STRIP.AUTO-MCNC: 233 MG/DL (ref 65–99)
GLUCOSE BLD STRIP.AUTO-MCNC: 246 MG/DL (ref 65–99)
GLUCOSE BLD STRIP.AUTO-MCNC: 96 MG/DL (ref 65–99)
SIGNIFICANT IND 70042: NORMAL
SIGNIFICANT IND 70042: NORMAL
SITE SITE: NORMAL
SITE SITE: NORMAL
SOURCE SOURCE: NORMAL
SOURCE SOURCE: NORMAL

## 2025-01-14 PROCEDURE — A9270 NON-COVERED ITEM OR SERVICE: HCPCS | Performed by: INTERNAL MEDICINE

## 2025-01-14 PROCEDURE — A9270 NON-COVERED ITEM OR SERVICE: HCPCS | Performed by: NURSE PRACTITIONER

## 2025-01-14 PROCEDURE — 82962 GLUCOSE BLOOD TEST: CPT | Mod: 91

## 2025-01-14 PROCEDURE — 700102 HCHG RX REV CODE 250 W/ 637 OVERRIDE(OP): Performed by: INTERNAL MEDICINE

## 2025-01-14 PROCEDURE — 700101 HCHG RX REV CODE 250: Performed by: NURSE PRACTITIONER

## 2025-01-14 PROCEDURE — 770006 HCHG ROOM/CARE - MED/SURG/GYN SEMI*

## 2025-01-14 PROCEDURE — 700111 HCHG RX REV CODE 636 W/ 250 OVERRIDE (IP): Mod: JZ | Performed by: INTERNAL MEDICINE

## 2025-01-14 PROCEDURE — 99232 SBSQ HOSP IP/OBS MODERATE 35: CPT | Performed by: STUDENT IN AN ORGANIZED HEALTH CARE EDUCATION/TRAINING PROGRAM

## 2025-01-14 PROCEDURE — 700102 HCHG RX REV CODE 250 W/ 637 OVERRIDE(OP)

## 2025-01-14 PROCEDURE — A9270 NON-COVERED ITEM OR SERVICE: HCPCS

## 2025-01-14 PROCEDURE — 700102 HCHG RX REV CODE 250 W/ 637 OVERRIDE(OP): Performed by: NURSE PRACTITIONER

## 2025-01-14 RX ADMIN — OXYCODONE 5 MG: 5 TABLET ORAL at 18:59

## 2025-01-14 RX ADMIN — OMEPRAZOLE 40 MG: 20 CAPSULE, DELAYED RELEASE ORAL at 06:24

## 2025-01-14 RX ADMIN — INSULIN GLARGINE-YFGN 20 UNITS: 100 INJECTION, SOLUTION SUBCUTANEOUS at 17:50

## 2025-01-14 RX ADMIN — OXYCODONE 5 MG: 5 TABLET ORAL at 09:54

## 2025-01-14 RX ADMIN — VENLAFAXINE 37.5 MG: 75 TABLET ORAL at 06:24

## 2025-01-14 RX ADMIN — OMEPRAZOLE 40 MG: 20 CAPSULE, DELAYED RELEASE ORAL at 17:22

## 2025-01-14 RX ADMIN — GABAPENTIN 200 MG: 100 CAPSULE ORAL at 17:22

## 2025-01-14 RX ADMIN — INSULIN LISPRO 2 UNITS: 100 INJECTION, SOLUTION INTRAVENOUS; SUBCUTANEOUS at 13:03

## 2025-01-14 RX ADMIN — OXYCODONE 5 MG: 5 TABLET ORAL at 00:56

## 2025-01-14 RX ADMIN — ENOXAPARIN SODIUM 40 MG: 100 INJECTION SUBCUTANEOUS at 17:22

## 2025-01-14 RX ADMIN — POLYETHYLENE GLYCOL 3350 1 PACKET: 17 POWDER, FOR SOLUTION ORAL at 04:06

## 2025-01-14 RX ADMIN — INSULIN LISPRO 2 UNITS: 100 INJECTION, SOLUTION INTRAVENOUS; SUBCUTANEOUS at 17:52

## 2025-01-14 RX ADMIN — LIDOCAINE 3 PATCH: 4 PATCH TOPICAL at 17:21

## 2025-01-14 RX ADMIN — GABAPENTIN 200 MG: 100 CAPSULE ORAL at 13:47

## 2025-01-14 RX ADMIN — GABAPENTIN 200 MG: 100 CAPSULE ORAL at 06:24

## 2025-01-14 RX ADMIN — INSULIN LISPRO 2 UNITS: 100 INJECTION, SOLUTION INTRAVENOUS; SUBCUTANEOUS at 20:35

## 2025-01-14 ASSESSMENT — ENCOUNTER SYMPTOMS
MYALGIAS: 1
SHORTNESS OF BREATH: 0
NAUSEA: 0
BACK PAIN: 1
ABDOMINAL PAIN: 0
WEAKNESS: 1
FEVER: 0
NECK PAIN: 1
VOMITING: 0

## 2025-01-14 ASSESSMENT — PAIN SCALES - WONG BAKER: WONGBAKER_NUMERICALRESPONSE: HURTS JUST A LITTLE BIT

## 2025-01-14 ASSESSMENT — PAIN DESCRIPTION - PAIN TYPE
TYPE: ACUTE PAIN

## 2025-01-14 NOTE — PROGRESS NOTES
Hospital Medicine Daily Progress Note    Date of Service  1/14/2025    Chief Complaint  Christoph Taylor is a 59 y.o. male admitted 1/9/2025 with weakness.    Hospital Course  Christoph Taylor is a 59-year-old male with prior suicidal ideation, poorly controlled diabetes mellitus type 2, housing insecurity and severe protein calorie malnutrition.  Admitted 1/9 for acute on chronic back pain.    Patient states that he is unable to take care of himself and has a hard time administering insulin, buying food for himself, taking his blood sugars. Additionally says that he has worsening neuropathy of the feet and hands. His pain is worse than ever. He has no additional help here.    In the ER he was found to be hyperglycemic and quite weak.  UA was concerning for possible UTI.  Patient was given 1 dose of IV cefazolin.  However, he denied urinary symptoms and antibiotics were not continued.     In terms of patient's weakness-MRI thoracic and lumbar notable for old compression fracture at T12 and multiple disc bulging without quinton central canal stenosis or cord impingement.  Neurosurgery was consulted.  Dr. Johnson recommending non operative management and outpatient follow-up for possible epidural injection.  PT OT recommending postacute rehabilitation.    Interval Problem Update  1/14: Vitals stable overnight.  -129.  Patient is medically clear for discharge pending placement.    I have discussed this patient's plan of care and discharge plan at IDT rounds today with Case Management, Nursing, Nursing leadership, and other members of the IDT team.    Consultants/Specialty  None at this time     Code Status  Full Code    Disposition  The patient is medically cleared for discharge to home or a post-acute facility.  Anticipate discharge to: skilled nursing facility    I have placed the appropriate orders for post-discharge needs.    Review of Systems  Review of Systems   Constitutional:  Negative for fever and  malaise/fatigue.   Respiratory:  Negative for shortness of breath.    Cardiovascular:  Negative for chest pain and leg swelling.   Gastrointestinal:  Negative for abdominal pain, nausea and vomiting.   Musculoskeletal:  Positive for back pain, myalgias and neck pain.   Neurological:  Positive for weakness.        Physical Exam  Temp:  [36.1 °C (97 °F)-36.6 °C (97.9 °F)] 36.2 °C (97.2 °F)  Pulse:  [77-83] 82  Resp:  [16-18] 18  BP: (113-160)/(61-98) 156/88  SpO2:  [92 %-94 %] 92 %    Physical Exam  Vitals and nursing note reviewed.   Constitutional:       General: He is not in acute distress.     Appearance: Normal appearance. He is ill-appearing.   Cardiovascular:      Rate and Rhythm: Normal rate and regular rhythm.   Pulmonary:      Effort: Pulmonary effort is normal.      Breath sounds: Normal breath sounds.   Skin:     General: Skin is warm and dry.   Neurological:      Mental Status: He is alert and oriented to person, place, and time. Mental status is at baseline.   Psychiatric:         Mood and Affect: Mood normal.         Behavior: Behavior normal.         Fluids    Intake/Output Summary (Last 24 hours) at 1/14/2025 1406  Last data filed at 1/14/2025 1030  Gross per 24 hour   Intake 280 ml   Output 1400 ml   Net -1120 ml        Laboratory                        Imaging  MR-LUMBAR SPINE-W/O   Final Result      1.  T12 chronic compression fracture with anterior wedge deformity.   2.  Postoperative changes with interval L4-5 laminectomy since prior MRI study. There is now laminectomy at L4-5-S1 and there is now posterior fusion with transpedicular screw fixation at L4-L5. Relief of previously seen severe central stenosis at L4-5.   3.  Multilevel degenerative changes as detailed for each level above in the body of report.      MR-THORACIC SPINE-W/O   Final Result      1.  T12 old compression fracture with anterior wedge deformity. Associated mild lower thoracic kyphosis.   2.  T2 old superior endplate  compression fracture.   3.  Multilevel disc bulges T3-T4, T7-T8, T8-T9, T11-12, T12-L1. Variable mass effect on the ventral thecal sac without quinton central stenosis or cord impingement at any thoracic level.   4.  No myelopathic cord signal at any thoracic level.      DX-CHEST-PORTABLE (1 VIEW)   Final Result      No acute cardiopulmonary disease evident.           Assessment/Plan  * Compression fracture of body of thoracic vertebra (HCC)- (present on admission)  Assessment & Plan  MRI T-spine with T12 old compression fracture, T2 old superior endplate compression fracture, multiple level disc bulges T3-4 T7-T8 T8-T9, T11-T12, variable mass effect with out  central stenosis or cord impingement, MRI L-spine with T12 compression fracture, previous L4-L5 laminectomy, multilevel degenerative changes.  - Neurosurgery has been consulted  - Recommending non operative management at this time  - Continue to follow-up outpatient for possible epidural  - Continue gabapentin, lidocaine and oxycodone as needed for pain management  - PT OT recommending postacute rehabilitation  - Placement is pending    Bacteriuria- (present on admission)  Assessment & Plan  No abx's for now given lack of symptoms    Physical debility- (present on admission)  Assessment & Plan  MRI with compression fracture  Neurosurgery recommended nonoperative management  Follow-up for epidural injection as outpatient  Placement is pending    Acute hypoxic respiratory failure (HCC)- (present on admission)  Assessment & Plan  Resolved  Now on room air, continue to monitor oxygen requirement    Elevated troponin- (present on admission)  Assessment & Plan  Chronically elevated. Baseline  - no further workup    Uncontrolled type 2 diabetes mellitus with hyperglycemia (HCC)- (present on admission)  Assessment & Plan  A1C 9.8  - Continue Lantus 20 units in the evening  - Continue sliding scale insulin  - Monitor BG trend.   - Accu-Cheks before meals and at bedtime.    - Goal to keep BG between 140-180 per 2019 ADA guidelines       CKD (chronic kidney disease)- (present on admission)  Assessment & Plan  Creatinine stable at 0.57         VTE prophylaxis:   SCDs/TEDs   enoxaparin ppx      I have performed a physical exam and reviewed and updated ROS and Plan today (1/14/2025). In review of yesterday's note (1/13/2025), there are no changes except as documented above.

## 2025-01-14 NOTE — PROGRESS NOTES
Hospital Medicine Daily Progress Note    Date of Service  1/13/2025    Chief Complaint  Christoph Taylor is a 59 y.o. male admitted 1/9/2025 with back pain    Hospital Course    59-year-old male with prior suicidal ideation, poorly controlled diabetes mellitus type 2, housing insecurity and severe protein calorie malnutrition with acute and chronic back pain presented to hospital generalized weakness and acute on chronic back pain. Patient states that he is unable to take care of himself and has a hard time administering insulin, buying food for himself, taking his blood sugars. Additionally says that he has worsening neuropathy of the feet and hands. His pain is worse than ever. He has no additional help here.  Subsequent MRI T-spine noted with multiple fracture, T12 old compression fracture.Neurosurgery Dr. Valentine consulted .  MRI finding discussed with neurosurgery Dr. Baer in details.  Recommended nonoperative management, and outpatient follow-up with epidural injection.   Need placement.   aware and assisting    Interval Problem Update    1/13/2025  Seen and examined at bedside, vitals been stable  Continue to complain of back pain but reports having significantly improved  Blood sugar around 168  Chart reviewed, meds reviewed  PLAN  Continue with pain management, currently on gabapentin, lidocaine patch, oxycodone.  Monitor for toxicity while on narcotics   assisting with placement  Neurosurgery Dr. Valentine recommended nonoperative management, and outpatient follow-up with epidural injection.  PT/OT recommending postacute placement,  assisting with placement    I have discussed this patient's plan of care and discharge plan at IDT rounds today with Case Management, Nursing, Nursing leadership, and other members of the IDT team.      Code Status  Full Code    Disposition  The patient is medically cleared for discharge to home or a post-acute  facility.  Anticipate discharge to: skilled nursing facility    I have placed the appropriate orders for post-discharge needs.    Review of Systems  Review of Systems   Constitutional:  Positive for malaise/fatigue.   Respiratory:  Negative for cough.    Cardiovascular:  Negative for chest pain.   Gastrointestinal:  Negative for nausea and vomiting.   Musculoskeletal:  Positive for joint pain.        Physical Exam  Temp:  [36.1 °C (97 °F)-36.4 °C (97.6 °F)] 36.1 °C (97 °F)  Pulse:  [64-81] 80  Resp:  [16-20] 16  BP: (114-129)/(68-72) 129/72  SpO2:  [93 %-94 %] 93 %    Physical Exam  Constitutional:       Appearance: He is ill-appearing.   Cardiovascular:      Rate and Rhythm: Normal rate.      Pulses: Normal pulses.   Pulmonary:      Effort: Pulmonary effort is normal.   Musculoskeletal:      Right lower leg: No edema.   Skin:     General: Skin is warm.   Neurological:      Mental Status: He is oriented to person, place, and time.      Comments: Overall generalized weakness, muscle strength 5 out of 5 all extremities, sensation intact   Psychiatric:         Mood and Affect: Mood normal.         Fluids    Intake/Output Summary (Last 24 hours) at 1/13/2025 1613  Last data filed at 1/13/2025 1310  Gross per 24 hour   Intake 720 ml   Output 800 ml   Net -80 ml        Laboratory        Recent Labs     01/11/25  0233   SODIUM 137   POTASSIUM 4.5   CHLORIDE 99   CO2 29   GLUCOSE 176*   BUN 26*   CREATININE 0.57   CALCIUM 8.4*                   Imaging  MR-LUMBAR SPINE-W/O   Final Result      1.  T12 chronic compression fracture with anterior wedge deformity.   2.  Postoperative changes with interval L4-5 laminectomy since prior MRI study. There is now laminectomy at L4-5-S1 and there is now posterior fusion with transpedicular screw fixation at L4-L5. Relief of previously seen severe central stenosis at L4-5.   3.  Multilevel degenerative changes as detailed for each level above in the body of report.      MR-THORACIC  SPINE-W/O   Final Result      1.  T12 old compression fracture with anterior wedge deformity. Associated mild lower thoracic kyphosis.   2.  T2 old superior endplate compression fracture.   3.  Multilevel disc bulges T3-T4, T7-T8, T8-T9, T11-12, T12-L1. Variable mass effect on the ventral thecal sac without quinton central stenosis or cord impingement at any thoracic level.   4.  No myelopathic cord signal at any thoracic level.      DX-CHEST-PORTABLE (1 VIEW)   Final Result      No acute cardiopulmonary disease evident.           Assessment/Plan  * Compression fracture of body of thoracic vertebra (HCC)- (present on admission)  Assessment & Plan  MRI T-spine with T12 old compression fracture, T2 old superior endplate compression fracture, multiple level disc bulges T3-4 T7-T8 T8-T9, T11-T12, variable mass effect with out  central stenosis or cord impingement, MRI L-spine with T12 compression fracture, previous L4-L5 laminectomy, multilevel degenerative changes.  Neurosurgery Dr. Valentine consulted .  MRI finding discussed with neurosurgery Dr. Bear in details.  Recommended nonoperative management, and outpatient follow-up with epidural injection.    1/13/2025  Continue with pain management, currently on gabapentin, lidocaine patch, oxycodone.  Monitor for toxicity while on narcotics   assisting with placement  Neurosurgery Dr. Valentine recommended nonoperative management, and outpatient follow-up with epidural injection.  PT/OT recommending postacute placement,  assisting with placement    Bacteriuria- (present on admission)  Assessment & Plan  No abx's for now given lack of symptoms    Physical debility- (present on admission)  Assessment & Plan  WC bound  No clear e/o infection  Increase gabapentin and adjust multimodal pain therapy  CBC is normal, I personally reviewed cbc on 1/10    1/12/2025    MRI with compression fracture  Neurosurgery recommended nonoperative  management  Follow-up for epidural injection as outpatient    Acute hypoxic respiratory failure (HCC)- (present on admission)  Assessment & Plan  Resolved  Now on room air, continue to monitor oxygen requirement    Elevated troponin- (present on admission)  Assessment & Plan  Chronically elevated. Baseline  - no further workup    Uncontrolled type 2 diabetes mellitus with hyperglycemia (HCC)- (present on admission)  Assessment & Plan  Persistent hyperglycemia  Re-start lantus 20 units qhs  ISS, consider adjsuting further  Cannot administer his own insulin    CKD (chronic kidney disease)- (present on admission)  Assessment & Plan  Remains stable, I personally reviewed the bmp on 1/10         VTE prophylaxis: lovenox    I have performed a physical exam and reviewed and updated ROS and Plan today (1/13/2025). In review of yesterday's note (1/12/2025), there are no changes except as documented above.                   Greater than 37 minutes spent preparing to see patient (e.g. review of tests) obtaining and/or reviewing separately obtained history. Performing a medically appropriate examination and/ evaluation.  Counseling and educating the patient/family/caregiver.  Ordering medications, tests, or procedures.  Referring and communicating with other health care professionals.  Documenting clinical information in EPIC.  Independently interpreting results and communicating results to patient/family/caregiver.  Care coordination.

## 2025-01-14 NOTE — HOSPITAL COURSE
Christoph Taylor is a 59-year-old male with prior suicidal ideation, poorly controlled diabetes mellitus type 2, housing insecurity and severe protein calorie malnutrition.  Admitted 1/9 for acute on chronic back pain.    Patient states that he is unable to take care of himself and has a hard time administering insulin, buying food for himself, taking his blood sugars. Additionally says that he has worsening neuropathy of the feet and hands. His pain is worse than ever. He has no additional help here.    In the ER he was found to be hyperglycemic and quite weak.  UA was concerning for possible UTI.  Patient was given 1 dose of IV cefazolin.  However, he denied urinary symptoms and antibiotics were not continued.     In terms of patient's weakness-MRI thoracic and lumbar notable for old compression fracture at T12 and multiple disc bulging without quinton central canal stenosis or cord impingement.  Neurosurgery was consulted.  Dr. Johnson recommending non operative management and outpatient follow-up for possible epidural injection.  PT OT recommending postacute rehabilitation.  However, no accepting facilities were identified.  Patient intermittently refusing to work with PT here.    Home health has been arranged prior to discharge.

## 2025-01-14 NOTE — CARE PLAN
The patient is Stable - Low risk of patient condition declining or worsening    Shift Goals  Clinical Goals: Patient will remain free from injury throughout shift  Patient Goals: rest, pain management, snacks  Family Goals: vicente    Patient alert and oriented x 4, does not utilize call light appropriately. Patient requesting snacks during most interactions and occasionally with call light.  Bed in low and locked position, call light in reach, rounded on hourly.      Progress made toward(s) clinical / shift goals:    Problem: Pain - Standard  Goal: Alleviation of pain or a reduction in pain to the patient’s comfort goal  Outcome: Progressing     Problem: Knowledge Deficit - Standard  Goal: Patient and family/care givers will demonstrate understanding of plan of care, disease process/condition, diagnostic tests and medications  Outcome: Progressing     Problem: Skin Integrity  Goal: Skin integrity is maintained or improved  Outcome: Progressing       Patient is not progressing towards the following goals:

## 2025-01-15 LAB
GLUCOSE BLD STRIP.AUTO-MCNC: 104 MG/DL (ref 65–99)
GLUCOSE BLD STRIP.AUTO-MCNC: 200 MG/DL (ref 65–99)
GLUCOSE BLD STRIP.AUTO-MCNC: 229 MG/DL (ref 65–99)
GLUCOSE BLD STRIP.AUTO-MCNC: 266 MG/DL (ref 65–99)

## 2025-01-15 PROCEDURE — 700102 HCHG RX REV CODE 250 W/ 637 OVERRIDE(OP): Performed by: NURSE PRACTITIONER

## 2025-01-15 PROCEDURE — 700111 HCHG RX REV CODE 636 W/ 250 OVERRIDE (IP): Mod: JZ | Performed by: INTERNAL MEDICINE

## 2025-01-15 PROCEDURE — A9270 NON-COVERED ITEM OR SERVICE: HCPCS

## 2025-01-15 PROCEDURE — A9270 NON-COVERED ITEM OR SERVICE: HCPCS | Performed by: NURSE PRACTITIONER

## 2025-01-15 PROCEDURE — 700102 HCHG RX REV CODE 250 W/ 637 OVERRIDE(OP): Performed by: INTERNAL MEDICINE

## 2025-01-15 PROCEDURE — A9270 NON-COVERED ITEM OR SERVICE: HCPCS | Performed by: INTERNAL MEDICINE

## 2025-01-15 PROCEDURE — 82962 GLUCOSE BLOOD TEST: CPT

## 2025-01-15 PROCEDURE — 700101 HCHG RX REV CODE 250: Performed by: NURSE PRACTITIONER

## 2025-01-15 PROCEDURE — 99232 SBSQ HOSP IP/OBS MODERATE 35: CPT | Performed by: STUDENT IN AN ORGANIZED HEALTH CARE EDUCATION/TRAINING PROGRAM

## 2025-01-15 PROCEDURE — 700102 HCHG RX REV CODE 250 W/ 637 OVERRIDE(OP)

## 2025-01-15 PROCEDURE — 770006 HCHG ROOM/CARE - MED/SURG/GYN SEMI*

## 2025-01-15 RX ADMIN — OXYCODONE 5 MG: 5 TABLET ORAL at 05:05

## 2025-01-15 RX ADMIN — GABAPENTIN 200 MG: 100 CAPSULE ORAL at 17:23

## 2025-01-15 RX ADMIN — LIDOCAINE 3 PATCH: 4 PATCH TOPICAL at 17:33

## 2025-01-15 RX ADMIN — INSULIN LISPRO 2 UNITS: 100 INJECTION, SOLUTION INTRAVENOUS; SUBCUTANEOUS at 20:15

## 2025-01-15 RX ADMIN — OXYCODONE 5 MG: 5 TABLET ORAL at 11:57

## 2025-01-15 RX ADMIN — VENLAFAXINE 37.5 MG: 75 TABLET ORAL at 05:01

## 2025-01-15 RX ADMIN — OXYCODONE 5 MG: 5 TABLET ORAL at 17:24

## 2025-01-15 RX ADMIN — ENOXAPARIN SODIUM 40 MG: 100 INJECTION SUBCUTANEOUS at 17:24

## 2025-01-15 RX ADMIN — INSULIN GLARGINE-YFGN 20 UNITS: 100 INJECTION, SOLUTION SUBCUTANEOUS at 17:29

## 2025-01-15 RX ADMIN — INSULIN LISPRO 3 UNITS: 100 INJECTION, SOLUTION INTRAVENOUS; SUBCUTANEOUS at 17:27

## 2025-01-15 RX ADMIN — OMEPRAZOLE 40 MG: 20 CAPSULE, DELAYED RELEASE ORAL at 17:23

## 2025-01-15 RX ADMIN — GABAPENTIN 200 MG: 100 CAPSULE ORAL at 05:01

## 2025-01-15 RX ADMIN — INSULIN LISPRO 1 UNITS: 100 INJECTION, SOLUTION INTRAVENOUS; SUBCUTANEOUS at 11:58

## 2025-01-15 RX ADMIN — GABAPENTIN 200 MG: 100 CAPSULE ORAL at 11:52

## 2025-01-15 RX ADMIN — OMEPRAZOLE 40 MG: 20 CAPSULE, DELAYED RELEASE ORAL at 05:01

## 2025-01-15 ASSESSMENT — ENCOUNTER SYMPTOMS
NAUSEA: 0
WEAKNESS: 1
FEVER: 0
ABDOMINAL PAIN: 0
VOMITING: 0
SHORTNESS OF BREATH: 0
NECK PAIN: 1
MYALGIAS: 1
BACK PAIN: 1

## 2025-01-15 ASSESSMENT — PAIN DESCRIPTION - PAIN TYPE
TYPE: ACUTE PAIN

## 2025-01-15 ASSESSMENT — PAIN SCALES - WONG BAKER: WONGBAKER_NUMERICALRESPONSE: HURTS JUST A LITTLE BIT

## 2025-01-15 NOTE — CARE PLAN
The patient is Stable - Low risk of patient condition declining or worsening    Shift Goals  Clinical Goals: Patient will remain free of fall this shift  Patient Goals: Rest  Family Goals: KATHERINE    Progress made toward(s) clinical / shift goals:  Patient medicated for pain, Resting comfortably, bed in lowest position, bed alarm set, call light within reach.     Patient is not progressing towards the following goals:

## 2025-01-15 NOTE — PROGRESS NOTES
4 Eyes Skin Assessment Completed by PENNIE Renteria and PENNIE Castañeda.    Head WDL  Ears WDL  Nose WDL  Mouth WDL  Neck WDL  Breast/Chest WDL  Shoulder Blades WDL  Spine scar  (R) Arm/Elbow/Hand Scab and Swelling, dressing to hand, dry skin  (L) Arm/Elbow/Hand Scab, dressing intact , dry skin  Abdomen WDL  Groin Excoriation  Scrotum/Coccyx/Buttocks Redness and Blanching  (R) Leg Scar and Scab,dressing in place   (L) Leg Scar and Scab,dressing in place   (R) Heel/Foot/Toe Redness and Blanching  (L) Heel/Foot/Toe Redness and Blanching          Devices In Places Blood Pressure Cuff      Interventions In Place Heel Mepilex, sacral mepilex,TAP System     Possible Skin Injury No    Pictures Uploaded Into Epic N/A  Wound Consult Placed N/A  RN Wound Prevention Protocol Ordered No

## 2025-01-15 NOTE — DISCHARGE PLANNING
CHW Ashwini received a Voalte message from Neeta CASPER requesting assistance to obtain and  pt's mail in locked box.   Sent teams message to CHW manager Padmaja Bryson to request approval . Not approved as this could open to much liability.   Sent voalte message to Neeta and advised that a friend or neighbor assist pt with getting pt's mail.     10:15 am   Called [employer] [phone] and left a message and contact information. Pending call back.

## 2025-01-15 NOTE — CARE PLAN
The patient is Stable - Low risk of patient condition declining or worsening    Shift Goals  Clinical Goals: Patient will remain free from injury throughout shift  Patient Goals: rest, pain management, snacks  Family Goals: vicente    Progress made toward(s) clinical / shift goals:  Increase independence & mobility    Patient is not progressing towards the following goals:

## 2025-01-15 NOTE — DISCHARGE PLANNING
"The patient has no insurance information on file, however, he states that he is insured with a [employer] plan as a retired [email or web address]. He does not have the card or number that changed from [employer] to [facility] on 1/1/25. CM reached out to \[facility]\"" for assistance with obtaining his new member number as patient states he has yet to receive his new card \"Its probably in my mailbox right now\". The patient reports \"I think the new number is [id number]\". CM provided this number to PFA for their review. The plan is for patient to discharge to SNF, referrals will be sent when insurance information received.   "

## 2025-01-15 NOTE — PROGRESS NOTES
Hospital Medicine Daily Progress Note    Date of Service  1/15/2025    Chief Complaint  Christoph Taylor is a 59 y.o. male admitted 1/9/2025 with weakness.    Hospital Course  Christoph Taylor is a 59-year-old male with prior suicidal ideation, poorly controlled diabetes mellitus type 2, housing insecurity and severe protein calorie malnutrition.  Admitted 1/9 for acute on chronic back pain.    Patient states that he is unable to take care of himself and has a hard time administering insulin, buying food for himself, taking his blood sugars. Additionally says that he has worsening neuropathy of the feet and hands. His pain is worse than ever. He has no additional help here.    In the ER he was found to be hyperglycemic and quite weak.  UA was concerning for possible UTI.  Patient was given 1 dose of IV cefazolin.  However, he denied urinary symptoms and antibiotics were not continued.     In terms of patient's weakness-MRI thoracic and lumbar notable for old compression fracture at T12 and multiple disc bulging without quinton central canal stenosis or cord impingement.  Neurosurgery was consulted.  Dr. Johnson recommending non operative management and outpatient follow-up for possible epidural injection.  PT OT recommending postacute rehabilitation.    Interval Problem Update  1/14: Vitals stable overnight.  -129.  Patient is medically clear for discharge pending placement.    1/15: Vitals remained stable.   through 156.  Glucose ranging 104 through 246.  Case management is working hard to obtain patient's insurance card to evaluate benefits for postacute rehabilitation.  Patient is medically cleared to discharge once insurance barrier has been resolved.    I have discussed this patient's plan of care and discharge plan at IDT rounds today with Case Management, Nursing, Nursing leadership, and other members of the IDT team.    Consultants/Specialty  None at this time     Code Status  Full  Code    Disposition  The patient is medically cleared for discharge to home or a post-acute facility.  Anticipate discharge to: skilled nursing facility    I have placed the appropriate orders for post-discharge needs.    Review of Systems  Review of Systems   Constitutional:  Negative for fever and malaise/fatigue.   Respiratory:  Negative for shortness of breath.    Cardiovascular:  Negative for chest pain and leg swelling.   Gastrointestinal:  Negative for abdominal pain, nausea and vomiting.   Musculoskeletal:  Positive for back pain, myalgias and neck pain.   Neurological:  Positive for weakness.        Physical Exam  Temp:  [36.1 °C (96.9 °F)-36.4 °C (97.5 °F)] 36.1 °C (96.9 °F)  Pulse:  [71-78] 78  Resp:  [17-19] 17  BP: (103-125)/(59-72) 107/72  SpO2:  [92 %-93 %] 93 %    Physical Exam  Vitals and nursing note reviewed.   Constitutional:       General: He is not in acute distress.     Appearance: Normal appearance. He is ill-appearing.   Cardiovascular:      Rate and Rhythm: Normal rate and regular rhythm.   Pulmonary:      Effort: Pulmonary effort is normal.      Breath sounds: Normal breath sounds.   Skin:     General: Skin is warm and dry.   Neurological:      Mental Status: He is alert and oriented to person, place, and time. Mental status is at baseline.   Psychiatric:         Mood and Affect: Mood normal.         Behavior: Behavior normal.         Fluids    Intake/Output Summary (Last 24 hours) at 1/15/2025 1106  Last data filed at 1/15/2025 0952  Gross per 24 hour   Intake 480 ml   Output --   Net 480 ml        Laboratory                        Imaging  MR-LUMBAR SPINE-W/O   Final Result      1.  T12 chronic compression fracture with anterior wedge deformity.   2.  Postoperative changes with interval L4-5 laminectomy since prior MRI study. There is now laminectomy at L4-5-S1 and there is now posterior fusion with transpedicular screw fixation at L4-L5. Relief of previously seen severe central stenosis  at L4-5.   3.  Multilevel degenerative changes as detailed for each level above in the body of report.      MR-THORACIC SPINE-W/O   Final Result      1.  T12 old compression fracture with anterior wedge deformity. Associated mild lower thoracic kyphosis.   2.  T2 old superior endplate compression fracture.   3.  Multilevel disc bulges T3-T4, T7-T8, T8-T9, T11-12, T12-L1. Variable mass effect on the ventral thecal sac without quinton central stenosis or cord impingement at any thoracic level.   4.  No myelopathic cord signal at any thoracic level.      DX-CHEST-PORTABLE (1 VIEW)   Final Result      No acute cardiopulmonary disease evident.           Assessment/Plan  * Compression fracture of body of thoracic vertebra (HCC)- (present on admission)  Assessment & Plan  MRI T-spine with T12 old compression fracture, T2 old superior endplate compression fracture, multiple level disc bulges T3-4 T7-T8 T8-T9, T11-T12, variable mass effect with out  central stenosis or cord impingement, MRI L-spine with T12 compression fracture, previous L4-L5 laminectomy, multilevel degenerative changes.  - Neurosurgery has been consulted  - Recommending non operative management at this time  - Continue to follow-up outpatient for possible epidural  - Continue gabapentin, lidocaine and oxycodone as needed for pain management  - PT OT recommending postacute rehabilitation  - Placement is pending    Bacteriuria- (present on admission)  Assessment & Plan  No abx's for now given lack of symptoms    Physical debility- (present on admission)  Assessment & Plan  MRI with compression fracture  Neurosurgery recommended nonoperative management  Follow-up for epidural injection as outpatient  Placement is pending    Acute hypoxic respiratory failure (HCC)- (present on admission)  Assessment & Plan  Resolved  Now on room air, continue to monitor oxygen requirement    Elevated troponin- (present on admission)  Assessment & Plan  Chronically elevated.  Baseline  - no further workup    Uncontrolled type 2 diabetes mellitus with hyperglycemia (HCC)- (present on admission)  Assessment & Plan  A1C 9.8  - Continue Lantus 20 units in the evening  - Continue sliding scale insulin; has required 6 units SSI in the last 24 hours  - Monitor BG trend.   - Accu-Cheks before meals and at bedtime.   - Goal to keep BG between 140-180 per 2019 ADA guidelines       CKD (chronic kidney disease)- (present on admission)  Assessment & Plan  Creatinine stable at 0.57         VTE prophylaxis:   SCDs/TEDs   enoxaparin ppx      I have performed a physical exam and reviewed and updated ROS and Plan today (1/15/2025). In review of yesterday's note (1/14/2025), there are no changes except as documented above.

## 2025-01-15 NOTE — PROGRESS NOTES
4 Eyes Skin Assessment Completed by Addy LARSON RN and Jess RIVAS RN.    Head WDL  Ears WDL  Nose WDL  Mouth WDL  Neck WDL  Breast/Chest WDL  Shoulder Blades WDL  Spine WDL  (R) Arm/Elbow/Hand Scab and Swelling, dressing to hand, dry skin  (L) Arm/Elbow/Hand Scab, dressing intact , dry skin  Abdomen WDL  Groin Excoriation  Scrotum/Coccyx/Buttocks Redness and Blanching  (R) Leg Scar and Scab, dressing to knee  (L) Leg Scar and Scab, dressing to knee  (R) Heel/Foot/Toe Redness and Blanching, offloading dressing in place  (L) Heel/Foot/Toe Redness and Blanching, redness to heel,Toe dressing in place          Devices In Places Blood Pressure Cuff      Interventions In Place Sacral Mepilex, TAP System, Pillows, Low Air Loss Mattress, Barrier Cream, and Heels Loaded W/Pillows    Possible Skin Injury No    Pictures Uploaded Into Epic Yes  Wound Consult Placed N/A  RN Wound Prevention Protocol Ordered No

## 2025-01-15 NOTE — THERAPY
01/15/25 1507   Interdisciplinary Plan of Care Collaboration   Collaboration Comments Attempted PT session this PT.  Pt refused all attempts.

## 2025-01-15 NOTE — DISCHARGE PLANNING
0836  CM and PFA have been unable to locate insurance coverage for the patient. He is confident that his insurance card is in his mailbox at home. DAVID escalated this situation to leadership as well as CHW. CHW will confer with patient to coordinate obtaining mail from the box. Patient has post acute recommendations from therapy and he will need his insurance in order to be accepted to any facility.     1017  DAVID made call to [facility] [phone] and spoke with Walt in the benefits department. She verified that patient is covered by a platinum Blue [employer] plan, and provided the phone number to [facility] [phone]. DAVID called the number and let patient speak to the representative, the member ID is: [id number]. DAVID provided this number to [facility] for benefit verification. Benefits were verified, and facesheet was updated. SNF referrals were sent out, CM will follow referrals for acceptance.

## 2025-01-16 ENCOUNTER — PHARMACY VISIT (OUTPATIENT)
Dept: PHARMACY | Facility: MEDICAL CENTER | Age: 60
End: 2025-01-16
Payer: COMMERCIAL

## 2025-01-16 LAB
GLUCOSE BLD STRIP.AUTO-MCNC: 211 MG/DL (ref 65–99)
GLUCOSE BLD STRIP.AUTO-MCNC: 220 MG/DL (ref 65–99)
GLUCOSE BLD STRIP.AUTO-MCNC: 265 MG/DL (ref 65–99)
GLUCOSE BLD STRIP.AUTO-MCNC: 305 MG/DL (ref 65–99)

## 2025-01-16 PROCEDURE — RXMED WILLOW AMBULATORY MEDICATION CHARGE: Performed by: STUDENT IN AN ORGANIZED HEALTH CARE EDUCATION/TRAINING PROGRAM

## 2025-01-16 PROCEDURE — A9270 NON-COVERED ITEM OR SERVICE: HCPCS | Performed by: NURSE PRACTITIONER

## 2025-01-16 PROCEDURE — 770006 HCHG ROOM/CARE - MED/SURG/GYN SEMI*

## 2025-01-16 PROCEDURE — 99233 SBSQ HOSP IP/OBS HIGH 50: CPT | Performed by: STUDENT IN AN ORGANIZED HEALTH CARE EDUCATION/TRAINING PROGRAM

## 2025-01-16 PROCEDURE — 82962 GLUCOSE BLOOD TEST: CPT

## 2025-01-16 PROCEDURE — 700101 HCHG RX REV CODE 250: Performed by: NURSE PRACTITIONER

## 2025-01-16 PROCEDURE — 700102 HCHG RX REV CODE 250 W/ 637 OVERRIDE(OP): Performed by: INTERNAL MEDICINE

## 2025-01-16 PROCEDURE — 700111 HCHG RX REV CODE 636 W/ 250 OVERRIDE (IP): Mod: JZ | Performed by: INTERNAL MEDICINE

## 2025-01-16 PROCEDURE — A9270 NON-COVERED ITEM OR SERVICE: HCPCS

## 2025-01-16 PROCEDURE — 700102 HCHG RX REV CODE 250 W/ 637 OVERRIDE(OP)

## 2025-01-16 PROCEDURE — A9270 NON-COVERED ITEM OR SERVICE: HCPCS | Performed by: INTERNAL MEDICINE

## 2025-01-16 PROCEDURE — 700102 HCHG RX REV CODE 250 W/ 637 OVERRIDE(OP): Performed by: NURSE PRACTITIONER

## 2025-01-16 RX ORDER — OXYCODONE HYDROCHLORIDE 5 MG/1
5 TABLET ORAL EVERY 8 HOURS PRN
Qty: 10 TABLET | Refills: 0 | Status: SHIPPED | OUTPATIENT
Start: 2025-01-16 | End: 2025-02-07

## 2025-01-16 RX ADMIN — INSULIN LISPRO 4 UNITS: 100 INJECTION, SOLUTION INTRAVENOUS; SUBCUTANEOUS at 08:21

## 2025-01-16 RX ADMIN — LIDOCAINE 3 PATCH: 4 PATCH TOPICAL at 16:36

## 2025-01-16 RX ADMIN — ENOXAPARIN SODIUM 40 MG: 100 INJECTION SUBCUTANEOUS at 17:35

## 2025-01-16 RX ADMIN — OXYCODONE 5 MG: 5 TABLET ORAL at 17:47

## 2025-01-16 RX ADMIN — OMEPRAZOLE 40 MG: 20 CAPSULE, DELAYED RELEASE ORAL at 05:30

## 2025-01-16 RX ADMIN — VENLAFAXINE 37.5 MG: 75 TABLET ORAL at 05:30

## 2025-01-16 RX ADMIN — INSULIN LISPRO 2 UNITS: 100 INJECTION, SOLUTION INTRAVENOUS; SUBCUTANEOUS at 21:47

## 2025-01-16 RX ADMIN — GABAPENTIN 200 MG: 100 CAPSULE ORAL at 05:31

## 2025-01-16 RX ADMIN — INSULIN LISPRO 3 UNITS: 100 INJECTION, SOLUTION INTRAVENOUS; SUBCUTANEOUS at 11:48

## 2025-01-16 RX ADMIN — INSULIN LISPRO 2 UNITS: 100 INJECTION, SOLUTION INTRAVENOUS; SUBCUTANEOUS at 17:33

## 2025-01-16 RX ADMIN — OXYCODONE 5 MG: 5 TABLET ORAL at 11:46

## 2025-01-16 RX ADMIN — OXYCODONE 5 MG: 5 TABLET ORAL at 05:31

## 2025-01-16 RX ADMIN — GABAPENTIN 200 MG: 100 CAPSULE ORAL at 17:35

## 2025-01-16 RX ADMIN — OMEPRAZOLE 40 MG: 20 CAPSULE, DELAYED RELEASE ORAL at 17:35

## 2025-01-16 RX ADMIN — GABAPENTIN 200 MG: 100 CAPSULE ORAL at 11:45

## 2025-01-16 ASSESSMENT — ENCOUNTER SYMPTOMS
VOMITING: 0
ABDOMINAL PAIN: 0
WEAKNESS: 1
BACK PAIN: 1
NAUSEA: 0
FEVER: 0
MYALGIAS: 1
SHORTNESS OF BREATH: 0
NECK PAIN: 1

## 2025-01-16 ASSESSMENT — PAIN DESCRIPTION - PAIN TYPE
TYPE: CHRONIC PAIN
TYPE: CHRONIC PAIN
TYPE: ACUTE PAIN
TYPE: ACUTE PAIN
TYPE: ACUTE PAIN;CHRONIC PAIN
TYPE: ACUTE PAIN

## 2025-01-16 NOTE — PROGRESS NOTES
Hospital Medicine Daily Progress Note    Date of Service  1/16/2025    Chief Complaint  Christoph Taylor is a 59 y.o. male admitted 1/9/2025 with weakness.    Hospital Course  Christoph Taylor is a 59-year-old male with prior suicidal ideation, poorly controlled diabetes mellitus type 2, housing insecurity and severe protein calorie malnutrition.  Admitted 1/9 for acute on chronic back pain.    Patient states that he is unable to take care of himself and has a hard time administering insulin, buying food for himself, taking his blood sugars. Additionally says that he has worsening neuropathy of the feet and hands. His pain is worse than ever. He has no additional help here.    In the ER he was found to be hyperglycemic and quite weak.  UA was concerning for possible UTI.  Patient was given 1 dose of IV cefazolin.  However, he denied urinary symptoms and antibiotics were not continued.     In terms of patient's weakness-MRI thoracic and lumbar notable for old compression fracture at T12 and multiple disc bulging without quinton central canal stenosis or cord impingement.  Neurosurgery was consulted.  Dr. Johnson recommending non operative management and outpatient follow-up for possible epidural injection.  PT OT recommending postacute rehabilitation.  However, no accepting facilities were identified.  Patient intermittently refusing to work with PT here.    Home health has been arranged prior to discharge.    Interval Problem Update  1/14: Vitals stable overnight.  -129.  Patient is medically clear for discharge pending placement.    1/15: Vitals remained stable.   through 156.  Glucose ranging 104 through 246.  Case management is working hard to obtain patient's insurance card to evaluate benefits for postacute rehabilitation.  Patient is medically cleared to discharge once insurance barrier has been resolved.    1/16: Vitals stable.  Glucose this a.m. 176.  Range 2 29 through 3 05.  Increase Lantus to  25 units in the p.m.  Multiple long discussions with patient, case management and nursing staff regarding discharge plan.  Patient feels unsafe to discharge to his home.  He has 5 steps.  He does not have a ramp.  He is largely wheelchair-bound due to chronic pain.    I have discussed this patient's plan of care and discharge plan at IDT rounds today with Case Management, Nursing, Nursing leadership, and other members of the IDT team.    Consultants/Specialty  None at this time     Code Status  Full Code    Disposition  The patient is medically cleared for discharge to home or a post-acute facility.      I have placed the appropriate orders for post-discharge needs.    Review of Systems  Review of Systems   Constitutional:  Negative for fever and malaise/fatigue.   Respiratory:  Negative for shortness of breath.    Cardiovascular:  Negative for chest pain and leg swelling.   Gastrointestinal:  Negative for abdominal pain, nausea and vomiting.   Musculoskeletal:  Positive for back pain, myalgias and neck pain.   Neurological:  Positive for weakness.        Physical Exam  Temp:  [36.1 °C (96.9 °F)-36.6 °C (97.8 °F)] 36.1 °C (96.9 °F)  Pulse:  [72-88] 88  Resp:  [16-19] 16  BP: (106-135)/(57-79) 132/79  SpO2:  [90 %-95 %] 94 %    Physical Exam  Vitals and nursing note reviewed.   Constitutional:       General: He is not in acute distress.     Appearance: Normal appearance. He is ill-appearing.   Cardiovascular:      Rate and Rhythm: Normal rate and regular rhythm.   Pulmonary:      Effort: Pulmonary effort is normal.      Breath sounds: Normal breath sounds.   Skin:     General: Skin is warm and dry.   Neurological:      Mental Status: He is alert and oriented to person, place, and time. Mental status is at baseline.   Psychiatric:         Mood and Affect: Mood normal.         Behavior: Behavior normal.         Fluids    Intake/Output Summary (Last 24 hours) at 1/16/2025 1310  Last data filed at 1/16/2025 0920  Gross  per 24 hour   Intake 1360 ml   Output 1100 ml   Net 260 ml        Laboratory                        Imaging  MR-LUMBAR SPINE-W/O   Final Result      1.  T12 chronic compression fracture with anterior wedge deformity.   2.  Postoperative changes with interval L4-5 laminectomy since prior MRI study. There is now laminectomy at L4-5-S1 and there is now posterior fusion with transpedicular screw fixation at L4-L5. Relief of previously seen severe central stenosis at L4-5.   3.  Multilevel degenerative changes as detailed for each level above in the body of report.      MR-THORACIC SPINE-W/O   Final Result      1.  T12 old compression fracture with anterior wedge deformity. Associated mild lower thoracic kyphosis.   2.  T2 old superior endplate compression fracture.   3.  Multilevel disc bulges T3-T4, T7-T8, T8-T9, T11-12, T12-L1. Variable mass effect on the ventral thecal sac without quinton central stenosis or cord impingement at any thoracic level.   4.  No myelopathic cord signal at any thoracic level.      DX-CHEST-PORTABLE (1 VIEW)   Final Result      No acute cardiopulmonary disease evident.           Assessment/Plan  * Compression fracture of body of thoracic vertebra (HCC)- (present on admission)  Assessment & Plan  MRI T-spine with T12 old compression fracture, T2 old superior endplate compression fracture, multiple level disc bulges T3-4 T7-T8 T8-T9, T11-T12, variable mass effect with out  central stenosis or cord impingement, MRI L-spine with T12 compression fracture, previous L4-L5 laminectomy, multilevel degenerative changes.  - Neurosurgery has been consulted  - Recommending non operative management at this time  - Continue to follow-up outpatient for possible epidural  - Continue gabapentin, lidocaine and oxycodone as needed for pain management  - PT OT recommending postacute rehabilitation  - I personally reviewed MRI images with patient at bedside 1/16    Bacteriuria- (present on admission)  Assessment &  Plan  No abx's for now given lack of symptoms    Physical debility- (present on admission)  Assessment & Plan  MRI with compression T2 and T12 fracture, multilevel bulging disc through thoracic spine.  No cord impingement.  Neurosurgery recommended nonoperative management  Follow-up for epidural injection as outpatient  Placement is pending    Acute hypoxic respiratory failure (HCC)- (present on admission)  Assessment & Plan  Resolved  Now on room air, continue to monitor oxygen requirement    Elevated troponin- (present on admission)  Assessment & Plan  Chronically elevated. Baseline  - no further workup    Uncontrolled type 2 diabetes mellitus with hyperglycemia (HCC)- (present on admission)  Assessment & Plan  A1C 9.8  - Increase Lantus to 25 units in the evening  - Continue sliding scale insulin; has required 6 units SSI in the last 24 hours  - Patient states he is unable to drop insulin at home due to hand coordination issues.  We will try to minimize sliding scale insulin and discharged with only Lantus pens.  - Monitor BG trend.   - Accu-Cheks before meals and at bedtime.   - Goal to keep BG between 140-180 per 2019 ADA guidelines       CKD (chronic kidney disease)- (present on admission)  Assessment & Plan  Creatinine stable at 0.57       My total time spent caring for the patient on the day of the encounter was 60 minutes.   This does not include time spent on separately billable procedures/tests.      VTE prophylaxis:   SCDs/TEDs   enoxaparin ppx      I have performed a physical exam and reviewed and updated ROS and Plan today (1/16/2025). In review of yesterday's note (1/15/2025), there are no changes except as documented above.

## 2025-01-16 NOTE — DISCHARGE PLANNING
DC Transport Scheduled    Transport Company Scheduled:  [facility]    Scheduled Date: 1/16/2025  Scheduled Time: 1300    Transport Type: Wheelchair  Destination:   Home   Destination address: [address] [place] [address] [address] [place] [address]    Notified care team of scheduled transport via Voalte.     If there are any changes needed to the DC transportation scheduled, please contact [facility] at ext. [phone] between the hours of [phone]. If outside those hours, contact the ED Case Manager at ext. [phone].

## 2025-01-16 NOTE — DISCHARGE PLANNING
Community Health Worker Initial First Visit Intake    Social determinates of health intake complete.   Identified barriers to : Self Care, Multiple readmissions  Contact information provided to Christoph Taylor Yes  Has PCP appointment scheduled for : Pending discharge disposition  Accepted  Meds-To-Beds.    Inpatient assessment completed.      MOLLY Maradiaga met with pt bedside per referral request from Neeta CASPER and introduced Community Care Management and completed SDOH. Pt ownes his own home, 5 stairs to enter home and is retired and receives [employer] benefits. Pt is not an active . Pt is current with PCP Orin Darnell. Pt states pt's barrier is that pt is no longer able to administer insulin and has gone without medication  and pt cannot properly use glucose monitor , pt is unable to cook and go grocery shopping and care for self. Pt has no family locally. Discharge is pending disposition at this time.     Discussed and provided resources for : In Home Care Services , [facility] Blossom ( home urgent care)   Advised pt that PCP office is capable of scheduling virtual appointments if needed.   Discussed pt with [facility] case mgmt.     Referred to Complex team per Neeta CASPER

## 2025-01-16 NOTE — PROGRESS NOTES
Pt was able to ambulate and pivot to chair with hand held assistance and holding on to bed rail. Pt was able to tolerate sitting up for breakfast. Pt is back in bed with call light in reach, bed alarm on and bed in lowest and locked position. All needs met at this time

## 2025-01-16 NOTE — CARE PLAN
The patient is Stable - Low risk of patient condition declining or worsening    Shift Goals  Clinical Goals: Patient safety, remaim free from fall this shift  Patient Goals: Rest  Family Goals: KATHERINE    Progress made toward(s) clinical / shift goals:  Patient remain free of fall this shift, medicated for pain, stand to commode and back to bed,  Resting comfortably, bed in lowest position, bed alarm set, call light within reach.     Patient is not progressing towards the following goals:

## 2025-01-16 NOTE — DISCHARGE PLANNING
0891  This patient has no accepting SNFs at this time. CM requested Medicaid screening from [facility] as patient can benefit from a long term payer for SNF. He was living in his apartment independently prior to admission. CM communicated with care team that he may have to return to his apartment with HH when he is medically cleared for discharge. DAVID will request CHW to see patient today to offer available resources that may assist him with living independently.     1011  CM spoke with the patient at bedside to inform him that he will be discharged back to his home today. He provided choice for [facility] for HH. CM called patient's sister Delia to inform her of his discharge. She expressed concerns about patient living at home alone, the following items were discussed with Delia:   -CHW will speak with patient today, if he is appropriate they will follow him outpatient and assist with community resources that may include food services.  -HH will see the patient at least 2x/wk (nursing and therapy visits)  -patient owns a wheelchair that he can use to get around his apartment  -family support is needed up to and including the possible relocation of the patient to be closer to their home  -Medicaid (if he remains in [place]) vs [employer] (if he moves to [place] to be closer to family  Delia was appreciative of the information received and will plan to meet with patient's sons to coordinate a long term plan. DAVID will arrange wheel chair transport via [facility].     3637  DAVID was notified by MD to cancel discharge. Transportation was canceled with [facility]. Patient is on injectable insulin and is unable to do blood sugar checks, draw up, or inject his insulin due to his inability to move his fingers. MD will be changing insulin to daily Glargine. He is willing to go to a group home, however, his monthly income is $2400, DAVID was unable to locate an option in his price range.   CM will submit a referral to complex discharge.

## 2025-01-17 LAB
GLUCOSE BLD STRIP.AUTO-MCNC: 104 MG/DL (ref 65–99)
GLUCOSE BLD STRIP.AUTO-MCNC: 125 MG/DL (ref 65–99)
GLUCOSE BLD STRIP.AUTO-MCNC: 150 MG/DL (ref 65–99)
GLUCOSE BLD STRIP.AUTO-MCNC: 196 MG/DL (ref 65–99)

## 2025-01-17 PROCEDURE — 700102 HCHG RX REV CODE 250 W/ 637 OVERRIDE(OP): Performed by: INTERNAL MEDICINE

## 2025-01-17 PROCEDURE — A9270 NON-COVERED ITEM OR SERVICE: HCPCS

## 2025-01-17 PROCEDURE — A9270 NON-COVERED ITEM OR SERVICE: HCPCS | Performed by: INTERNAL MEDICINE

## 2025-01-17 PROCEDURE — 700111 HCHG RX REV CODE 636 W/ 250 OVERRIDE (IP): Mod: JZ | Performed by: INTERNAL MEDICINE

## 2025-01-17 PROCEDURE — 99232 SBSQ HOSP IP/OBS MODERATE 35: CPT | Performed by: STUDENT IN AN ORGANIZED HEALTH CARE EDUCATION/TRAINING PROGRAM

## 2025-01-17 PROCEDURE — 82962 GLUCOSE BLOOD TEST: CPT | Mod: 91

## 2025-01-17 PROCEDURE — 770006 HCHG ROOM/CARE - MED/SURG/GYN SEMI*

## 2025-01-17 PROCEDURE — 700101 HCHG RX REV CODE 250: Performed by: NURSE PRACTITIONER

## 2025-01-17 PROCEDURE — 700102 HCHG RX REV CODE 250 W/ 637 OVERRIDE(OP): Performed by: NURSE PRACTITIONER

## 2025-01-17 PROCEDURE — 700102 HCHG RX REV CODE 250 W/ 637 OVERRIDE(OP)

## 2025-01-17 PROCEDURE — A9270 NON-COVERED ITEM OR SERVICE: HCPCS | Performed by: NURSE PRACTITIONER

## 2025-01-17 RX ADMIN — GABAPENTIN 200 MG: 100 CAPSULE ORAL at 17:29

## 2025-01-17 RX ADMIN — INSULIN LISPRO 1 UNITS: 100 INJECTION, SOLUTION INTRAVENOUS; SUBCUTANEOUS at 21:32

## 2025-01-17 RX ADMIN — OXYCODONE 5 MG: 5 TABLET ORAL at 04:15

## 2025-01-17 RX ADMIN — OMEPRAZOLE 40 MG: 20 CAPSULE, DELAYED RELEASE ORAL at 17:30

## 2025-01-17 RX ADMIN — OMEPRAZOLE 40 MG: 20 CAPSULE, DELAYED RELEASE ORAL at 04:14

## 2025-01-17 RX ADMIN — GABAPENTIN 200 MG: 100 CAPSULE ORAL at 04:15

## 2025-01-17 RX ADMIN — LIDOCAINE 3 PATCH: 4 PATCH TOPICAL at 16:07

## 2025-01-17 RX ADMIN — TIZANIDINE 2 MG: 4 TABLET ORAL at 21:47

## 2025-01-17 RX ADMIN — ENOXAPARIN SODIUM 40 MG: 100 INJECTION SUBCUTANEOUS at 17:29

## 2025-01-17 RX ADMIN — OXYCODONE 5 MG: 5 TABLET ORAL at 12:31

## 2025-01-17 RX ADMIN — ACETAMINOPHEN 1000 MG: 500 TABLET ORAL at 21:46

## 2025-01-17 RX ADMIN — VENLAFAXINE 37.5 MG: 75 TABLET ORAL at 04:14

## 2025-01-17 RX ADMIN — GABAPENTIN 200 MG: 100 CAPSULE ORAL at 12:31

## 2025-01-17 RX ADMIN — OXYCODONE 5 MG: 5 TABLET ORAL at 18:30

## 2025-01-17 ASSESSMENT — FIBROSIS 4 INDEX: FIB4 SCORE: 0.88

## 2025-01-17 ASSESSMENT — ENCOUNTER SYMPTOMS
WEAKNESS: 1
ABDOMINAL PAIN: 0
NECK PAIN: 1
FEVER: 0
SHORTNESS OF BREATH: 0
NAUSEA: 0
VOMITING: 0
MYALGIAS: 1
BACK PAIN: 1

## 2025-01-17 ASSESSMENT — PAIN DESCRIPTION - PAIN TYPE
TYPE: ACUTE PAIN
TYPE: CHRONIC PAIN
TYPE: ACUTE PAIN;CHRONIC PAIN
TYPE: CHRONIC PAIN
TYPE: ACUTE PAIN
TYPE: CHRONIC PAIN
TYPE: ACUTE PAIN;CHRONIC PAIN
TYPE: ACUTE PAIN;CHRONIC PAIN

## 2025-01-17 NOTE — CARE PLAN
The patient is Stable - Low risk of patient condition declining or worsening    Shift Goals  Clinical Goals: Patient safety, remaim free from fall this shift  Patient Goals: Rest  Family Goals: KATHERINE    Progress made toward(s) clinical / shift goals:    Problem: Pain Management  Goal: Pain level will decrease to patient's comfort goal  Outcome: Progressing     Pt given prn pain medication throughout the shift. Pt reports improvement of pain and was able to get more rest during the shift.   Patient is not progressing towards the following goals:

## 2025-01-17 NOTE — CARE PLAN
The patient is Stable - Low risk of patient condition declining or worsening    Shift Goals  Clinical Goals: Patient safety, remain free from fall this shift  Patient Goals: Rest  Family Goals: KATHREINE    Progress made toward(s) clinical / shift goals:  Patient remain free of fall this shift, medicated for pain, Resting comfortably, bed in lowest position, bed alarm set, call light within reach. Dressing changed.     Patient is not progressing towards the following goals:

## 2025-01-17 NOTE — DISCHARGE PLANNING
PFA representative met with the patient at bedside and stated that he is above the income guideline for medicaid (max income is $1700 for 1 person household), patient earns $2400/mo. She informed him that he may qualify for the financial assistance program to assist with the bill for this admission.

## 2025-01-17 NOTE — PROGRESS NOTES
Hospital Medicine Daily Progress Note    Date of Service  1/17/2025    Chief Complaint  Christoph Taylor is a 59 y.o. male admitted 1/9/2025 with weakness.    Hospital Course  Christoph Taylor is a 59-year-old male with prior suicidal ideation, poorly controlled diabetes mellitus type 2, housing insecurity and severe protein calorie malnutrition.  Admitted 1/9 for acute on chronic back pain.    Patient states that he is unable to take care of himself and has a hard time administering insulin, buying food for himself, taking his blood sugars. Additionally says that he has worsening neuropathy of the feet and hands. His pain is worse than ever. He has no additional help here.    In the ER he was found to be hyperglycemic and quite weak.  UA was concerning for possible UTI.  Patient was given 1 dose of IV cefazolin.  However, he denied urinary symptoms and antibiotics were not continued.     In terms of patient's weakness-MRI thoracic and lumbar notable for old compression fracture at T12 and multiple disc bulging without quinton central canal stenosis or cord impingement.  Neurosurgery was consulted.  Dr. Johnson recommending non operative management and outpatient follow-up for possible epidural injection.  PT OT recommending postacute rehabilitation.  However, no accepting facilities were identified.  Patient intermittently refusing to work with PT here.    Home health has been arranged prior to discharge.    Interval Problem Update  1/14: Vitals stable overnight.  -129.  Patient is medically clear for discharge pending placement.    1/15: Vitals remained stable.   through 156.  Glucose ranging 104 through 246.  Case management is working hard to obtain patient's insurance card to evaluate benefits for postacute rehabilitation.  Patient is medically cleared to discharge once insurance barrier has been resolved.    1/16: Vitals stable.  Glucose this a.m. 176.  Range 2 29 through 3 05.  Increase Lantus to  25 units in the p.m.  Multiple long discussions with patient, case management and nursing staff regarding discharge plan.  Patient feels unsafe to discharge to his home.  He has 5 steps.  He does not have a ramp.  He is largely wheelchair-bound due to chronic pain.    1/17: Vital stable overnight.  -130.  Glucose improved with increase Lantus dosing.  A.m. blood sugar 104.  Patient is now being followed by complex discharge committee.    I have discussed this patient's plan of care and discharge plan at IDT rounds today with Case Management, Nursing, Nursing leadership, and other members of the IDT team.    Consultants/Specialty  None at this time     Code Status  Full Code    Disposition  The patient is medically cleared for discharge to home or a post-acute facility.  Anticipate discharge to: skilled nursing facility    I have placed the appropriate orders for post-discharge needs.    Review of Systems  Review of Systems   Constitutional:  Negative for fever and malaise/fatigue.   Respiratory:  Negative for shortness of breath.    Cardiovascular:  Negative for chest pain and leg swelling.   Gastrointestinal:  Negative for abdominal pain, nausea and vomiting.   Musculoskeletal:  Positive for back pain, myalgias and neck pain.   Neurological:  Positive for weakness.        Physical Exam  Temp:  [36.1 °C (96.9 °F)-36.6 °C (97.8 °F)] 36.1 °C (97 °F)  Pulse:  [63-72] 72  Resp:  [17-18] 17  BP: (103-130)/(60-80) 130/78  SpO2:  [94 %-97 %] 97 %    Physical Exam  Vitals and nursing note reviewed.   Constitutional:       General: He is not in acute distress.     Appearance: Normal appearance. He is ill-appearing.   Cardiovascular:      Rate and Rhythm: Normal rate and regular rhythm.   Pulmonary:      Effort: Pulmonary effort is normal.      Breath sounds: Normal breath sounds.   Skin:     General: Skin is warm and dry.   Neurological:      Mental Status: He is alert and oriented to person, place, and time. Mental  status is at baseline.   Psychiatric:         Mood and Affect: Mood normal.         Behavior: Behavior normal.         Fluids    Intake/Output Summary (Last 24 hours) at 1/17/2025 1309  Last data filed at 1/17/2025 1000  Gross per 24 hour   Intake 1120 ml   Output 550 ml   Net 570 ml        Laboratory                        Imaging  MR-LUMBAR SPINE-W/O   Final Result      1.  T12 chronic compression fracture with anterior wedge deformity.   2.  Postoperative changes with interval L4-5 laminectomy since prior MRI study. There is now laminectomy at L4-5-S1 and there is now posterior fusion with transpedicular screw fixation at L4-L5. Relief of previously seen severe central stenosis at L4-5.   3.  Multilevel degenerative changes as detailed for each level above in the body of report.      MR-THORACIC SPINE-W/O   Final Result      1.  T12 old compression fracture with anterior wedge deformity. Associated mild lower thoracic kyphosis.   2.  T2 old superior endplate compression fracture.   3.  Multilevel disc bulges T3-T4, T7-T8, T8-T9, T11-12, T12-L1. Variable mass effect on the ventral thecal sac without quinton central stenosis or cord impingement at any thoracic level.   4.  No myelopathic cord signal at any thoracic level.      DX-CHEST-PORTABLE (1 VIEW)   Final Result      No acute cardiopulmonary disease evident.           Assessment/Plan  * Compression fracture of body of thoracic vertebra (HCC)- (present on admission)  Assessment & Plan  MRI T-spine with T12 old compression fracture, T2 old superior endplate compression fracture, multiple level disc bulges T3-4 T7-T8 T8-T9, T11-T12, variable mass effect with out  central stenosis or cord impingement, MRI L-spine with T12 compression fracture, previous L4-L5 laminectomy, multilevel degenerative changes.  - Neurosurgery has been consulted  - Recommending non operative management at this time  - Continue to follow-up outpatient for possible epidural  - Continue  gabapentin, lidocaine and oxycodone as needed for pain management  - PT OT recommending postacute rehabilitation  - I personally reviewed MRI images with patient at bedside 1/16    Bacteriuria- (present on admission)  Assessment & Plan  No abx's for now given lack of symptoms    Physical debility- (present on admission)  Assessment & Plan  MRI with compression T2 and T12 fracture, multilevel bulging disc through thoracic spine.  No cord impingement.  Neurosurgery recommended nonoperative management  Follow-up for epidural injection as outpatient  Placement is pending    Acute hypoxic respiratory failure (HCC)- (present on admission)  Assessment & Plan  Resolved  Now on room air, continue to monitor oxygen requirement    Elevated troponin- (present on admission)  Assessment & Plan  Chronically elevated. Baseline  - no further workup    Uncontrolled type 2 diabetes mellitus with hyperglycemia (HCC)- (present on admission)  Assessment & Plan  A1C 9.8  - Continue Lantus to 25 units in the evening; low threshold to increase again if blood sugars remain elevated  - Continue sliding scale insulin; has required 11 units SSI in the last 24 hours  - Patient states he is unable to drop insulin at home due to hand coordination issues.  We will try to minimize sliding scale insulin and discharged with only Lantus pens.  - Monitor BG trend.   - Accu-Cheks before meals and at bedtime.   - Goal to keep BG between 140-180 per 2019 ADA guidelines       CKD (chronic kidney disease)- (present on admission)  Assessment & Plan  Creatinine stable at 0.57       VTE prophylaxis:   SCDs/TEDs   enoxaparin ppx      I have performed a physical exam and reviewed and updated ROS and Plan today (1/17/2025). In review of yesterday's note (1/16/2025), there are no changes except as documented above.

## 2025-01-17 NOTE — DISCHARGE PLANNING
Complex Case Management    Order received for Complex Case Management. Patient's chart has been reviewed.     Complex case management following? Yes    Yes: Case assigned to Alex JOSEPH  NOTE: There may be cases that are NOT assigned to a CCM but will be followed for progression of care by leaders of the Complex Discharge Committee.

## 2025-01-18 LAB
GLUCOSE BLD STRIP.AUTO-MCNC: 149 MG/DL (ref 65–99)
GLUCOSE BLD STRIP.AUTO-MCNC: 190 MG/DL (ref 65–99)
GLUCOSE BLD STRIP.AUTO-MCNC: 200 MG/DL (ref 65–99)
GLUCOSE BLD STRIP.AUTO-MCNC: 93 MG/DL (ref 65–99)

## 2025-01-18 PROCEDURE — 700102 HCHG RX REV CODE 250 W/ 637 OVERRIDE(OP): Performed by: INTERNAL MEDICINE

## 2025-01-18 PROCEDURE — A9270 NON-COVERED ITEM OR SERVICE: HCPCS | Performed by: INTERNAL MEDICINE

## 2025-01-18 PROCEDURE — 770006 HCHG ROOM/CARE - MED/SURG/GYN SEMI*

## 2025-01-18 PROCEDURE — 700111 HCHG RX REV CODE 636 W/ 250 OVERRIDE (IP): Mod: JZ | Performed by: INTERNAL MEDICINE

## 2025-01-18 PROCEDURE — 700101 HCHG RX REV CODE 250: Performed by: NURSE PRACTITIONER

## 2025-01-18 PROCEDURE — A9270 NON-COVERED ITEM OR SERVICE: HCPCS

## 2025-01-18 PROCEDURE — 700102 HCHG RX REV CODE 250 W/ 637 OVERRIDE(OP): Performed by: NURSE PRACTITIONER

## 2025-01-18 PROCEDURE — 82962 GLUCOSE BLOOD TEST: CPT | Mod: 91

## 2025-01-18 PROCEDURE — 99232 SBSQ HOSP IP/OBS MODERATE 35: CPT | Performed by: STUDENT IN AN ORGANIZED HEALTH CARE EDUCATION/TRAINING PROGRAM

## 2025-01-18 PROCEDURE — A9270 NON-COVERED ITEM OR SERVICE: HCPCS | Performed by: NURSE PRACTITIONER

## 2025-01-18 PROCEDURE — 700102 HCHG RX REV CODE 250 W/ 637 OVERRIDE(OP)

## 2025-01-18 RX ADMIN — OMEPRAZOLE 40 MG: 20 CAPSULE, DELAYED RELEASE ORAL at 17:49

## 2025-01-18 RX ADMIN — OXYCODONE 5 MG: 5 TABLET ORAL at 16:32

## 2025-01-18 RX ADMIN — INSULIN LISPRO 1 UNITS: 100 INJECTION, SOLUTION INTRAVENOUS; SUBCUTANEOUS at 22:28

## 2025-01-18 RX ADMIN — INSULIN LISPRO 1 UNITS: 100 INJECTION, SOLUTION INTRAVENOUS; SUBCUTANEOUS at 17:54

## 2025-01-18 RX ADMIN — ENOXAPARIN SODIUM 40 MG: 100 INJECTION SUBCUTANEOUS at 17:49

## 2025-01-18 RX ADMIN — OXYCODONE 5 MG: 5 TABLET ORAL at 04:27

## 2025-01-18 RX ADMIN — OMEPRAZOLE 40 MG: 20 CAPSULE, DELAYED RELEASE ORAL at 04:22

## 2025-01-18 RX ADMIN — VENLAFAXINE 37.5 MG: 75 TABLET ORAL at 04:22

## 2025-01-18 RX ADMIN — OXYCODONE 5 MG: 5 TABLET ORAL at 11:33

## 2025-01-18 RX ADMIN — GABAPENTIN 200 MG: 100 CAPSULE ORAL at 04:22

## 2025-01-18 RX ADMIN — GABAPENTIN 200 MG: 100 CAPSULE ORAL at 17:49

## 2025-01-18 RX ADMIN — LIDOCAINE 3 PATCH: 4 PATCH TOPICAL at 16:31

## 2025-01-18 RX ADMIN — GABAPENTIN 200 MG: 100 CAPSULE ORAL at 11:34

## 2025-01-18 ASSESSMENT — ENCOUNTER SYMPTOMS
BACK PAIN: 1
NAUSEA: 0
ABDOMINAL PAIN: 0
NECK PAIN: 1
FEVER: 0
SHORTNESS OF BREATH: 0
WEAKNESS: 1
MYALGIAS: 1
VOMITING: 0

## 2025-01-18 ASSESSMENT — PAIN DESCRIPTION - PAIN TYPE
TYPE: ACUTE PAIN
TYPE: CHRONIC PAIN
TYPE: CHRONIC PAIN
TYPE: ACUTE PAIN;CHRONIC PAIN
TYPE: ACUTE PAIN

## 2025-01-18 NOTE — CARE PLAN
The patient is Stable - Low risk of patient condition declining or worsening    Shift Goals  Clinical Goals: Pain control to comfort level less than 5/10 within 12 hour shift  Patient Goals: Rest, Pain Control  Family Goals: KATHERINE    Progress made toward(s) clinical / shift goals: Medicated per MAR for complaints of pain, with some relief verbalized. Non-pharmacologic comfort measures instructed. 1 person assist with pivot to sena/commode. Wound dressing done. Able to make needs known, call light placed within easy reach.      Patient is not progressing towards the following goals:      Problem: Pain Management  Goal: Pain level will decrease to patient's comfort goal  Outcome: Not Progressing     Problem: Discharge Barriers/Planning  Goal: Patient's continuum of care needs are met  Description: Target End Date:  Prior to discharge or change in level of care    1.  Identify potential discharge barriers on admission and throughout hospitalization  2.  Collaborate with Case Management, , Clinical Educators, Navigators and others on the transitional care team to meet discharge needs  3.  Involve patient/family/caregivers in setting and prioritizing goals for hospitalization and discharge  4.  Ensure Flu vaccinations are addressed  5.  Inquire if patient is interested in the Meds to Bed program  6.  Ensure patient and family/caregiver are able to demonstrate use of equipment as prescribed  7.  Ensure patient and family/caregiver can verbalize understanding of patient education  8.  Explain discharge instructions and medication reconciliation to patient and family/caregiver  1/18/2025 0124 by Adri Parra RLASHONDA  Outcome: Not Progressing       Problem: Bowel Elimination  Goal: Establish and maintain regular bowel function  Description: Target End Date:  Prior to discharge or change in level of care    1.   Note date of last BM  2.   Educate about diet, fluid intake, medication and activity to promote  bowel function  3.   Educate signs and symptoms of constipation and interventions to implement  4.   Pharmacologic bowel management per provider order  5.   Regular toileting schedule  6.   Upright position for toileting  7.   High fiber diet  8.   Encourage hydration  9.   Collaborate with Clinical Dietician  1/18/2025 0124 by Adri Parra RBRITTNEE.  Outcome: Not Progressing       Problem: Self Care  Goal: Patient will have the ability to perform ADLs independently or with assistance (bathe, groom, dress, toilet and feed)  Description: Target End Date:  Prior to discharge or change in level of care    Document on ADL flowsheet    1.  Assess the capability and level of deficiency to perform ADLs  2.  Encourage family/care giver involvement  3.  Provide assistive devices  4.  Consider PT/OT evaluations  5.  Maintain support, give positive feedback, encourage self-care allowing extra time and verbal cuing as needed  6.  Avoid doing something for patients they can do themselves, but provide assistance as needed  7.  Assist in anticipating/planning individual needs  8.  Collaborate with Case Management and  to meet discharge needs  1/18/2025 0124 by Adri Parra R.N.  Outcome: Not Progressing     Problem: Skin Integrity  Goal: Risk for impaired skin integrity will decrease  1/18/2025 0124 by Adri Parra R.N.  Outcome: Not Progressing

## 2025-01-18 NOTE — CARE PLAN
The patient is Stable - Low risk of patient condition declining or worsening    Shift Goals  Clinical Goals: Pt to sit in chair for 2 meals during the shift  Patient Goals: Pt to rest  Family Goals: KATHERINE    Progress made toward(s) clinical / shift goals:    Problem: Safety  Goal: Will remain free from injury  Outcome: Progressing     Pt remained free of injury throughout the shift. Pt given prn pain medication and lidocaine patches during this shift.      Patient is not progressing towards the following goals:

## 2025-01-18 NOTE — PROGRESS NOTES
Hospital Medicine Daily Progress Note    Date of Service  1/18/2025    Chief Complaint  Christoph Taylor is a 59 y.o. male admitted 1/9/2025 with weakness.    Hospital Course  Christoph Taylor is a 59-year-old male with prior suicidal ideation, poorly controlled diabetes mellitus type 2, housing insecurity and severe protein calorie malnutrition.  Admitted 1/9 for acute on chronic back pain.    Patient states that he is unable to take care of himself and has a hard time administering insulin, buying food for himself, taking his blood sugars. Additionally says that he has worsening neuropathy of the feet and hands. His pain is worse than ever. He has no additional help here.    In the ER he was found to be hyperglycemic and quite weak.  UA was concerning for possible UTI.  Patient was given 1 dose of IV cefazolin.  However, he denied urinary symptoms and antibiotics were not continued.     In terms of patient's weakness-MRI thoracic and lumbar notable for old compression fracture at T12 and multiple disc bulging without quinton central canal stenosis or cord impingement.  Neurosurgery was consulted.  Dr. Johnson recommending non operative management and outpatient follow-up for possible epidural injection.  PT OT recommending postacute rehabilitation.  However, no accepting facilities were identified.  Patient intermittently refusing to work with PT here.    Home health has been arranged prior to discharge.    Interval Problem Update  1/14: Vitals stable overnight.  -129.  Patient is medically clear for discharge pending placement.    1/15: Vitals remained stable.   through 156.  Glucose ranging 104 through 246.  Case management is working hard to obtain patient's insurance card to evaluate benefits for postacute rehabilitation.  Patient is medically cleared to discharge once insurance barrier has been resolved.    1/16: Vitals stable.  Glucose this a.m. 176.  Range 2 29 through 3 05.  Increase Lantus to  25 units in the p.m.  Multiple long discussions with patient, case management and nursing staff regarding discharge plan.  Patient feels unsafe to discharge to his home.  He has 5 steps.  He does not have a ramp.  He is largely wheelchair-bound due to chronic pain.    1/17: Vital stable overnight.  -130.  Glucose improved with increase Lantus dosing.  A.m. blood sugar 104.  Patient is now being followed by complex discharge committee.    1/18: Vitals notable for SBP .  Glucose ranging 125 through 196.  Patient required 1 unit sliding scale insulin yesterday.  Patient remains medically clear for discharge though a safe discharge plan has not been arranged yet.  Encouraged ambulation at least 3 times daily.    I have discussed this patient's plan of care and discharge plan at IDT rounds today with Case Management, Nursing, Nursing leadership, and other members of the IDT team.    Consultants/Specialty  None at this time     Code Status  Full Code    Disposition  The patient is medically cleared for discharge to home or a post-acute facility.      I have placed the appropriate orders for post-discharge needs.    Review of Systems  Review of Systems   Constitutional:  Negative for fever and malaise/fatigue.   Respiratory:  Negative for shortness of breath.    Cardiovascular:  Negative for chest pain and leg swelling.   Gastrointestinal:  Negative for abdominal pain, nausea and vomiting.   Musculoskeletal:  Positive for back pain, myalgias and neck pain.   Neurological:  Positive for weakness.        Physical Exam  Temp:  [36 °C (96.8 °F)-36.6 °C (97.8 °F)] 36.5 °C (97.7 °F)  Pulse:  [64-82] 72  Resp:  [16-18] 18  BP: ()/(57-75) 110/58  SpO2:  [90 %-96 %] 95 %    Physical Exam  Vitals and nursing note reviewed.   Constitutional:       General: He is not in acute distress.     Appearance: Normal appearance. He is ill-appearing.   Cardiovascular:      Rate and Rhythm: Normal rate and regular rhythm.    Pulmonary:      Effort: Pulmonary effort is normal.      Breath sounds: Normal breath sounds.   Skin:     General: Skin is warm and dry.   Neurological:      Mental Status: He is alert and oriented to person, place, and time. Mental status is at baseline.   Psychiatric:         Mood and Affect: Mood normal.         Behavior: Behavior normal.         Fluids    Intake/Output Summary (Last 24 hours) at 1/18/2025 1027  Last data filed at 1/18/2025 0427  Gross per 24 hour   Intake 1150 ml   Output 2550 ml   Net -1400 ml        Laboratory                        Imaging  MR-LUMBAR SPINE-W/O   Final Result      1.  T12 chronic compression fracture with anterior wedge deformity.   2.  Postoperative changes with interval L4-5 laminectomy since prior MRI study. There is now laminectomy at L4-5-S1 and there is now posterior fusion with transpedicular screw fixation at L4-L5. Relief of previously seen severe central stenosis at L4-5.   3.  Multilevel degenerative changes as detailed for each level above in the body of report.      MR-THORACIC SPINE-W/O   Final Result      1.  T12 old compression fracture with anterior wedge deformity. Associated mild lower thoracic kyphosis.   2.  T2 old superior endplate compression fracture.   3.  Multilevel disc bulges T3-T4, T7-T8, T8-T9, T11-12, T12-L1. Variable mass effect on the ventral thecal sac without quinton central stenosis or cord impingement at any thoracic level.   4.  No myelopathic cord signal at any thoracic level.      DX-CHEST-PORTABLE (1 VIEW)   Final Result      No acute cardiopulmonary disease evident.           Assessment/Plan  * Compression fracture of body of thoracic vertebra (HCC)- (present on admission)  Assessment & Plan  MRI T-spine with T12 old compression fracture, T2 old superior endplate compression fracture, multiple level disc bulges T3-4 T7-T8 T8-T9, T11-T12, variable mass effect with out  central stenosis or cord impingement, MRI L-spine with T12  compression fracture, previous L4-L5 laminectomy, multilevel degenerative changes.  - Neurosurgery has been consulted  - Recommending non operative management at this time  - Continue to follow-up outpatient for possible epidural  - Continue gabapentin, lidocaine and oxycodone as needed for pain management  - PT OT recommending postacute rehabilitation  - I personally reviewed MRI images with patient at bedside 1/16    Bacteriuria- (present on admission)  Assessment & Plan  No abx's for now given lack of symptoms    Physical debility- (present on admission)  Assessment & Plan  MRI with compression T2 and T12 fracture, multilevel bulging disc through thoracic spine.  No cord impingement.  Neurosurgery recommended nonoperative management  Follow-up for epidural injection as outpatient  Placement is pending    Acute hypoxic respiratory failure (HCC)- (present on admission)  Assessment & Plan  Resolved  Now on room air, continue to monitor oxygen requirement    Elevated troponin- (present on admission)  Assessment & Plan  Chronically elevated. Baseline  - no further workup    Uncontrolled type 2 diabetes mellitus with hyperglycemia (HCC)- (present on admission)  Assessment & Plan  A1C 9.8  - Continue Lantus to 25 units in the evening  - Continue sliding scale insulin; has required 1 unit SSI in the last 24 hours  - Patient states he is unable to drop insulin at home due to hand coordination issues.  We will try to minimize sliding scale insulin and discharged with only Lantus pens.  - Monitor BG trend.   - Accu-Cheks before meals and at bedtime.   - Goal to keep BG between 140-180 per 2019 ADA guidelines       CKD (chronic kidney disease)- (present on admission)  Assessment & Plan  Creatinine stable at 0.57       VTE prophylaxis:   SCDs/TEDs   enoxaparin ppx      I have performed a physical exam and reviewed and updated ROS and Plan today (1/18/2025). In review of yesterday's note (1/17/2025), there are no changes  except as documented above.

## 2025-01-18 NOTE — PROGRESS NOTES
4 Eyes Skin Assessment Completed by PENNIE Rush and PENNIE Hogue.    Head WDL  Ears WDL  Nose WDL  Mouth WDL  Neck WDL  Breast/Chest WDL  Shoulder Blades WDL  Spine Scar, Redness, Blanching  (R) Arm/Elbow/Hand Redness, Blanching, and Scab, Cracking, Callus, Flaky, dressing in placed  (L) Arm/Elbow/Hand Redness, Blanching, and Scab, Cracking, callus, Flaky, dressing in placed  Abdomen WDL  Groin Redness, Blanching, Fragile  Scrotum/Coccyx/Buttocks Redness and Blanching, Fragile  (R) Leg Scar and Scab, dressing in placed  (L) Leg Scar and Scab, dressing in placed  (R) Heel/Foot/Toe Redness and Blanching, Scabs, dressing in placed  (L) Heel/Foot/Toe Redness and Blanching, Scabs, dressing n placed          Devices In Places Blood Pressure Cuff, Pulse Ox, and SCD's      Interventions In Place Heel Mepilex, Sacral Mepilex, TAP System, Pillows, Optifoam, Low Air Loss Mattress, Barrier Cream, and Heels Loaded W/Pillows    Possible Skin Injury Yes    Pictures Uploaded Into Epic N/A  Wound Consult Placed N/A-already consulted  RN Wound Prevention Protocol Ordered Yes

## 2025-01-19 LAB
GLUCOSE BLD STRIP.AUTO-MCNC: 108 MG/DL (ref 65–99)
GLUCOSE BLD STRIP.AUTO-MCNC: 216 MG/DL (ref 65–99)
GLUCOSE BLD STRIP.AUTO-MCNC: 221 MG/DL (ref 65–99)

## 2025-01-19 PROCEDURE — 700111 HCHG RX REV CODE 636 W/ 250 OVERRIDE (IP): Mod: JZ | Performed by: INTERNAL MEDICINE

## 2025-01-19 PROCEDURE — A9270 NON-COVERED ITEM OR SERVICE: HCPCS

## 2025-01-19 PROCEDURE — 700102 HCHG RX REV CODE 250 W/ 637 OVERRIDE(OP): Performed by: NURSE PRACTITIONER

## 2025-01-19 PROCEDURE — A9270 NON-COVERED ITEM OR SERVICE: HCPCS | Performed by: INTERNAL MEDICINE

## 2025-01-19 PROCEDURE — 770006 HCHG ROOM/CARE - MED/SURG/GYN SEMI*

## 2025-01-19 PROCEDURE — 99232 SBSQ HOSP IP/OBS MODERATE 35: CPT | Performed by: STUDENT IN AN ORGANIZED HEALTH CARE EDUCATION/TRAINING PROGRAM

## 2025-01-19 PROCEDURE — 700102 HCHG RX REV CODE 250 W/ 637 OVERRIDE(OP): Performed by: INTERNAL MEDICINE

## 2025-01-19 PROCEDURE — 700102 HCHG RX REV CODE 250 W/ 637 OVERRIDE(OP)

## 2025-01-19 PROCEDURE — 82962 GLUCOSE BLOOD TEST: CPT | Mod: 91

## 2025-01-19 PROCEDURE — A9270 NON-COVERED ITEM OR SERVICE: HCPCS | Performed by: NURSE PRACTITIONER

## 2025-01-19 RX ADMIN — OXYCODONE 5 MG: 5 TABLET ORAL at 11:05

## 2025-01-19 RX ADMIN — OXYCODONE 5 MG: 5 TABLET ORAL at 04:37

## 2025-01-19 RX ADMIN — OMEPRAZOLE 40 MG: 20 CAPSULE, DELAYED RELEASE ORAL at 20:49

## 2025-01-19 RX ADMIN — OXYCODONE 5 MG: 5 TABLET ORAL at 20:50

## 2025-01-19 RX ADMIN — OMEPRAZOLE 40 MG: 20 CAPSULE, DELAYED RELEASE ORAL at 04:39

## 2025-01-19 RX ADMIN — ENOXAPARIN SODIUM 40 MG: 100 INJECTION SUBCUTANEOUS at 20:51

## 2025-01-19 RX ADMIN — GABAPENTIN 200 MG: 100 CAPSULE ORAL at 20:48

## 2025-01-19 RX ADMIN — VENLAFAXINE 37.5 MG: 75 TABLET ORAL at 04:39

## 2025-01-19 RX ADMIN — GABAPENTIN 200 MG: 100 CAPSULE ORAL at 04:44

## 2025-01-19 RX ADMIN — INSULIN LISPRO 2 UNITS: 100 INJECTION, SOLUTION INTRAVENOUS; SUBCUTANEOUS at 13:17

## 2025-01-19 RX ADMIN — GABAPENTIN 200 MG: 100 CAPSULE ORAL at 13:17

## 2025-01-19 RX ADMIN — INSULIN LISPRO 2 UNITS: 100 INJECTION, SOLUTION INTRAVENOUS; SUBCUTANEOUS at 21:13

## 2025-01-19 ASSESSMENT — ENCOUNTER SYMPTOMS
BACK PAIN: 1
SHORTNESS OF BREATH: 0
NAUSEA: 0
FEVER: 0
VOMITING: 0
ABDOMINAL PAIN: 0
WEAKNESS: 1
NECK PAIN: 1
MYALGIAS: 1

## 2025-01-19 ASSESSMENT — PAIN DESCRIPTION - PAIN TYPE
TYPE: ACUTE PAIN;CHRONIC PAIN
TYPE: ACUTE PAIN;CHRONIC PAIN
TYPE: ACUTE PAIN

## 2025-01-19 NOTE — CARE PLAN
The patient is Stable - Low risk of patient condition declining or worsening    Shift Goals  Clinical Goals: Pain control to comfort level less than 5/10 within 12 hour shift  Patient Goals: Rest, Pain Control  Family Goals: KATHERINE    Progress made toward(s) clinical / shift goals: Medicated per MAR for complaints of pain, with some relief verbalized. Non-pharmacologic comfort measures instructed. 1 person assist with  pivot to chair/commode. Wound dressing done, CDI. Able to make needs known, call light placed within easy reach.      Patient is not progressing towards the following goals:      Problem: Pain Management  Goal: Pain level will decrease to patient's comfort goal  Outcome: Not Progressing     Problem: Discharge Barriers/Planning  Goal: Patient's continuum of care needs are met  Description: Target End Date:  Prior to discharge or change in level of care    1.  Identify potential discharge barriers on admission and throughout hospitalization  2.  Collaborate with Case Management, , Clinical Educators, Navigators and others on the transitional care team to meet discharge needs  3.  Involve patient/family/caregivers in setting and prioritizing goals for hospitalization and discharge  4.  Ensure Flu vaccinations are addressed  5.  Inquire if patient is interested in the Meds to Bed program  6.  Ensure patient and family/caregiver are able to demonstrate use of equipment as prescribed  7.  Ensure patient and family/caregiver can verbalize understanding of patient education  8.  Explain discharge instructions and medication reconciliation to patient and family/caregiver  Outcome: Not Progressing     Problem: Bowel Elimination  Goal: Establish and maintain regular bowel function  Description: Target End Date:  Prior to discharge or change in level of care    1.   Note date of last BM  2.   Educate about diet, fluid intake, medication and activity to promote bowel function  3.   Educate signs and  symptoms of constipation and interventions to implement  4.   Pharmacologic bowel management per provider order  5.   Regular toileting schedule  6.   Upright position for toileting  7.   High fiber diet  8.   Encourage hydration  Outcome: Not Progressing     Problem: Mobility  Goal: Patient's capacity to carry out activities will improve  Description: Target End Date:  Prior to discharge or change in level of care    1.  Assess for barriers to mobility/activity  2.  Implement activity per interdisciplinary team recommendations  3.  Target activity level identified and patient/family/caregiver aware of goal  4.  Provide assistive devices  5.  Instruct patient/caregiver on proper use of assistive/adaptive devices  6.  Schedule activities and rest periods to decrease effects of fatigue  7.  Encourage mobilization to extent of ability  8.  Maintain proper body alignment  9.  Provide adequate pain management to allow progressive mobilization  Outcome: Not Progressing     Problem: Self Care  Goal: Patient will have the ability to perform ADLs independently or with assistance (bathe, groom, dress, toilet and feed)  Description: Target End Date:  Prior to discharge or change in level of care    Document on ADL flowsheet    1.  Assess the capability and level of deficiency to perform ADLs  2.  Encourage family/care giver involvement  3.  Provide assistive devices  4.  Consider PT/OT evaluations  5.  Maintain support, give positive feedback, encourage self-care allowing extra time and verbal cuing as needed  6.  Avoid doing something for patients they can do themselves, but provide assistance as needed  7.  Assist in anticipating/planning individual needs  8.  Collaborate with Case Management and  to meet discharge needs  Outcome: Not Progressing     Problem: Skin Integrity  Goal: Risk for impaired skin integrity will decrease  Outcome: Not Progressing

## 2025-01-19 NOTE — CARE PLAN
The patient is Stable - Low risk of patient condition declining or worsening    Shift Goals  Clinical Goals: Pts blood sugars to be monitored and pt to be more involved with his care  Patient Goals: Pt to rest and be comfortable  Family Goals: KATHERINE    Progress made toward(s) clinical / shift goals:    Problem: Safety  Goal: Will remain free from injury  Outcome: Progressing     Problem: Skin Integrity  Goal: Risk for impaired skin integrity will decrease  Outcome: Progressing     Pt ambulated to chair for two meals today. Pt given prn pain medication before sitting in chair and was able to tolerate sitting in chair for at least an hour both times.     Patient is not progressing towards the following goals:

## 2025-01-19 NOTE — PROGRESS NOTES
Hospital Medicine Daily Progress Note    Date of Service  1/19/2025    Chief Complaint  Christoph Taylor is a 59 y.o. male admitted 1/9/2025 with weakness.    Hospital Course  Christoph Taylor is a 59-year-old male with prior suicidal ideation, poorly controlled diabetes mellitus type 2, housing insecurity and severe protein calorie malnutrition.  Admitted 1/9 for acute on chronic back pain.    Patient states that he is unable to take care of himself and has a hard time administering insulin, buying food for himself, taking his blood sugars. Additionally says that he has worsening neuropathy of the feet and hands. His pain is worse than ever. He has no additional help here.    In the ER he was found to be hyperglycemic and quite weak.  UA was concerning for possible UTI.  Patient was given 1 dose of IV cefazolin.  However, he denied urinary symptoms and antibiotics were not continued.     In terms of patient's weakness-MRI thoracic and lumbar notable for old compression fracture at T12 and multiple disc bulging without quinton central canal stenosis or cord impingement.  Neurosurgery was consulted.  Dr. Johnson recommending non operative management and outpatient follow-up for possible epidural injection.  PT OT recommending postacute rehabilitation.  However, no accepting facilities were identified.  Patient intermittently refusing to work with PT here.    Home health has been arranged prior to discharge.    Interval Problem Update  1/14: Vitals stable overnight.  -129.  Patient is medically clear for discharge pending placement.    1/15: Vitals remained stable.   through 156.  Glucose ranging 104 through 246.  Case management is working hard to obtain patient's insurance card to evaluate benefits for postacute rehabilitation.  Patient is medically cleared to discharge once insurance barrier has been resolved.    1/16: Vitals stable.  Glucose this a.m. 176.  Range 2 29 through 3 05.  Increase Lantus to  25 units in the p.m.  Multiple long discussions with patient, case management and nursing staff regarding discharge plan.  Patient feels unsafe to discharge to his home.  He has 5 steps.  He does not have a ramp.  He is largely wheelchair-bound due to chronic pain.    1/17: Vital stable overnight.  -130.  Glucose improved with increase Lantus dosing.  A.m. blood sugar 104.  Patient is now being followed by complex discharge committee.    1/18: Vitals notable for SBP .  Glucose ranging 125 through 196.  Patient required 1 unit sliding scale insulin yesterday.  Patient remains medically clear for discharge though a safe discharge plan has not been arranged yet.  Encouraged ambulation at least 3 times daily.    1/19: Vital stable overnight.   through 126.  Glucose range .  Required 2 units SSI over last 24 hours.    I have discussed this patient's plan of care and discharge plan at IDT rounds today with Case Management, Nursing, Nursing leadership, and other members of the IDT team.    Consultants/Specialty  None at this time     Code Status  Full Code    Disposition  The patient is medically cleared for discharge to home or a post-acute facility.      I have placed the appropriate orders for post-discharge needs.    Review of Systems  Review of Systems   Constitutional:  Negative for fever and malaise/fatigue.   Respiratory:  Negative for shortness of breath.    Cardiovascular:  Negative for chest pain and leg swelling.   Gastrointestinal:  Negative for abdominal pain, nausea and vomiting.   Musculoskeletal:  Positive for back pain, myalgias and neck pain.   Neurological:  Positive for weakness.        Physical Exam  Temp:  [36.1 °C (97 °F)-36.6 °C (97.8 °F)] 36.1 °C (97 °F)  Pulse:  [61-78] 68  Resp:  [16-18] 16  BP: (104-126)/(57-73) 126/73  SpO2:  [92 %-96 %] 95 %    Physical Exam  Vitals and nursing note reviewed.   Constitutional:       General: He is not in acute distress.      Appearance: Normal appearance. He is ill-appearing.   Cardiovascular:      Rate and Rhythm: Normal rate and regular rhythm.   Pulmonary:      Effort: Pulmonary effort is normal.      Breath sounds: Normal breath sounds.   Skin:     General: Skin is warm and dry.   Neurological:      Mental Status: He is alert and oriented to person, place, and time. Mental status is at baseline.   Psychiatric:         Mood and Affect: Mood normal.         Behavior: Behavior normal.         Fluids    Intake/Output Summary (Last 24 hours) at 1/19/2025 1321  Last data filed at 1/19/2025 0858  Gross per 24 hour   Intake 800 ml   Output 650 ml   Net 150 ml        Laboratory                        Imaging  MR-LUMBAR SPINE-W/O   Final Result      1.  T12 chronic compression fracture with anterior wedge deformity.   2.  Postoperative changes with interval L4-5 laminectomy since prior MRI study. There is now laminectomy at L4-5-S1 and there is now posterior fusion with transpedicular screw fixation at L4-L5. Relief of previously seen severe central stenosis at L4-5.   3.  Multilevel degenerative changes as detailed for each level above in the body of report.      MR-THORACIC SPINE-W/O   Final Result      1.  T12 old compression fracture with anterior wedge deformity. Associated mild lower thoracic kyphosis.   2.  T2 old superior endplate compression fracture.   3.  Multilevel disc bulges T3-T4, T7-T8, T8-T9, T11-12, T12-L1. Variable mass effect on the ventral thecal sac without quinton central stenosis or cord impingement at any thoracic level.   4.  No myelopathic cord signal at any thoracic level.      DX-CHEST-PORTABLE (1 VIEW)   Final Result      No acute cardiopulmonary disease evident.           Assessment/Plan  * Compression fracture of body of thoracic vertebra (HCC)- (present on admission)  Assessment & Plan  MRI T-spine with T12 old compression fracture, T2 old superior endplate compression fracture, multiple level disc bulges T3-4  T7-T8 T8-T9, T11-T12, variable mass effect with out  central stenosis or cord impingement, MRI L-spine with T12 compression fracture, previous L4-L5 laminectomy, multilevel degenerative changes.  - Neurosurgery has been consulted  - Recommending non operative management at this time  - Continue to follow-up outpatient for possible epidural  - Continue gabapentin, lidocaine and oxycodone as needed for pain management  - PT OT recommending postacute rehabilitation  - I personally reviewed MRI images with patient at bedside 1/16    Bacteriuria- (present on admission)  Assessment & Plan  No abx's for now given lack of symptoms    Physical debility- (present on admission)  Assessment & Plan  MRI with compression T2 and T12 fracture, multilevel bulging disc through thoracic spine.  No cord impingement.  Neurosurgery recommended nonoperative management  Follow-up for epidural injection as outpatient  Placement is pending    Acute hypoxic respiratory failure (HCC)- (present on admission)  Assessment & Plan  Resolved  Now on room air, continue to monitor oxygen requirement    Elevated troponin- (present on admission)  Assessment & Plan  Chronically elevated. Baseline  - no further workup    Uncontrolled type 2 diabetes mellitus with hyperglycemia (HCC)- (present on admission)  Assessment & Plan  A1C 9.8  - Continue Lantus to 25 units in the evening  - Continue sliding scale insulin; has required 2 units SSI in last 24 hours  - Patient states he is unable to drop insulin at home due to hand coordination issues.  We will try to minimize sliding scale insulin and discharged with only Lantus pens.  - Monitor BG trend.   - Accu-Cheks before meals and at bedtime.   - Goal to keep BG between 140-180 per 2019 ADA guidelines     CKD (chronic kidney disease)- (present on admission)  Assessment & Plan  Creatinine stable at 0.57       VTE prophylaxis:   SCDs/TEDs      I have performed a physical exam and reviewed and updated ROS and  Plan today (1/19/2025). In review of yesterday's note (1/18/2025), there are no changes except as documented above.

## 2025-01-20 PROBLEM — F33.1 MODERATE EPISODE OF RECURRENT MAJOR DEPRESSIVE DISORDER (HCC): Status: ACTIVE | Noted: 2025-01-20

## 2025-01-20 LAB
GLUCOSE BLD STRIP.AUTO-MCNC: 143 MG/DL (ref 65–99)
GLUCOSE BLD STRIP.AUTO-MCNC: 149 MG/DL (ref 65–99)
GLUCOSE BLD STRIP.AUTO-MCNC: 197 MG/DL (ref 65–99)
GLUCOSE BLD STRIP.AUTO-MCNC: 221 MG/DL (ref 65–99)

## 2025-01-20 PROCEDURE — 700111 HCHG RX REV CODE 636 W/ 250 OVERRIDE (IP): Mod: JZ | Performed by: INTERNAL MEDICINE

## 2025-01-20 PROCEDURE — A9270 NON-COVERED ITEM OR SERVICE: HCPCS | Performed by: INTERNAL MEDICINE

## 2025-01-20 PROCEDURE — A9270 NON-COVERED ITEM OR SERVICE: HCPCS | Performed by: NURSE PRACTITIONER

## 2025-01-20 PROCEDURE — 700102 HCHG RX REV CODE 250 W/ 637 OVERRIDE(OP): Performed by: NURSE PRACTITIONER

## 2025-01-20 PROCEDURE — 700102 HCHG RX REV CODE 250 W/ 637 OVERRIDE(OP)

## 2025-01-20 PROCEDURE — 700101 HCHG RX REV CODE 250: Performed by: NURSE PRACTITIONER

## 2025-01-20 PROCEDURE — 770006 HCHG ROOM/CARE - MED/SURG/GYN SEMI*

## 2025-01-20 PROCEDURE — A9270 NON-COVERED ITEM OR SERVICE: HCPCS

## 2025-01-20 PROCEDURE — 82962 GLUCOSE BLOOD TEST: CPT | Mod: 91

## 2025-01-20 PROCEDURE — 99232 SBSQ HOSP IP/OBS MODERATE 35: CPT | Performed by: STUDENT IN AN ORGANIZED HEALTH CARE EDUCATION/TRAINING PROGRAM

## 2025-01-20 PROCEDURE — 700102 HCHG RX REV CODE 250 W/ 637 OVERRIDE(OP): Performed by: INTERNAL MEDICINE

## 2025-01-20 RX ORDER — VENLAFAXINE 75 MG/1
75 TABLET ORAL DAILY
Status: DISCONTINUED | OUTPATIENT
Start: 2025-01-21 | End: 2025-02-07 | Stop reason: HOSPADM

## 2025-01-20 RX ADMIN — ENOXAPARIN SODIUM 40 MG: 100 INJECTION SUBCUTANEOUS at 17:48

## 2025-01-20 RX ADMIN — VENLAFAXINE 37.5 MG: 75 TABLET ORAL at 06:06

## 2025-01-20 RX ADMIN — GABAPENTIN 200 MG: 100 CAPSULE ORAL at 06:07

## 2025-01-20 RX ADMIN — OMEPRAZOLE 40 MG: 20 CAPSULE, DELAYED RELEASE ORAL at 17:48

## 2025-01-20 RX ADMIN — OXYCODONE 5 MG: 5 TABLET ORAL at 13:02

## 2025-01-20 RX ADMIN — LIDOCAINE 3 PATCH: 4 PATCH TOPICAL at 21:39

## 2025-01-20 RX ADMIN — INSULIN LISPRO 2 UNITS: 100 INJECTION, SOLUTION INTRAVENOUS; SUBCUTANEOUS at 17:49

## 2025-01-20 RX ADMIN — INSULIN LISPRO 1 UNITS: 100 INJECTION, SOLUTION INTRAVENOUS; SUBCUTANEOUS at 21:04

## 2025-01-20 RX ADMIN — GABAPENTIN 200 MG: 100 CAPSULE ORAL at 21:37

## 2025-01-20 RX ADMIN — OXYCODONE 5 MG: 5 TABLET ORAL at 06:09

## 2025-01-20 RX ADMIN — OXYCODONE 5 MG: 5 TABLET ORAL at 21:01

## 2025-01-20 RX ADMIN — OMEPRAZOLE 40 MG: 20 CAPSULE, DELAYED RELEASE ORAL at 06:07

## 2025-01-20 RX ADMIN — GABAPENTIN 200 MG: 100 CAPSULE ORAL at 14:00

## 2025-01-20 ASSESSMENT — PAIN DESCRIPTION - PAIN TYPE
TYPE: ACUTE PAIN
TYPE: ACUTE PAIN
TYPE: CHRONIC PAIN
TYPE: CHRONIC PAIN
TYPE: ACUTE PAIN

## 2025-01-20 ASSESSMENT — ENCOUNTER SYMPTOMS
WEAKNESS: 1
SHORTNESS OF BREATH: 0
NAUSEA: 0
FEVER: 0
NECK PAIN: 1
DEPRESSION: 1
MYALGIAS: 1
VOMITING: 0
ABDOMINAL PAIN: 0
BACK PAIN: 1

## 2025-01-20 NOTE — CARE PLAN
The patient is Stable - Low risk of patient condition declining or worsening    Shift Goals  Clinical Goals: Patient will comply with all skin interventions and dressing changes throughout shift  Patient Goals: rest  Family Goals: vicente    Patient A&Ox4, utilizes call light appropriately. Patient agreed to take meds refused during previous shift, verbalizes understanding of importance of taking medications on schedule. All dressing changes.  Bed in low and locked position, call light in reach, rounded on hourly.     Progress made toward(s) clinical / shift goals:    Problem: Safety  Goal: Will remain free from falls  Outcome: Progressing     Problem: Safety  Goal: Will remain free from injury  Outcome: Progressing       Patient is not progressing towards the following goals:

## 2025-01-20 NOTE — PROGRESS NOTES
Notified Mandie Barillas MD, pt notified the nurse apprentice working with this RN that he is having thought of suicide. Pt states he does not have a plan but he is having depressed thoughts. MD to come to bedside and have one on one with pt, to make a plan with pt to assist with depressive thoughts as pt shared with MD.

## 2025-01-20 NOTE — ASSESSMENT & PLAN NOTE
Patient endorsing depression.  Unwillingness to participate in his rehabilitation.  Endorses feelings of helplessness and hopelessness.  Per nursing staff he has expressed SI without a plan.  He does not endorse SI to me; on 2 separate occasions.  - Continue to encourage patient to participate in his rehabilitation.  - Increase venlafaxine to 75 mg  - Inpatient behavioral health referral has been placed.  - I do not see an indication to initiate a legal hold at this point.  Patient denies SI and SI plan to me.  Per nursing staff patient does become agitated when his snacks are withheld.  We will continue to monitor the situation closely.

## 2025-01-20 NOTE — PROGRESS NOTES
"Communication with Mandie Barillas MD, pt upset when RN enters room with medications previously discussed with pt. Pt is yelling and refusing medications, pt yelling \"I will f'n die right here\". This RN believes it may be because of a conversation with the CNA regarding snacks as pt is referring to snacks during yelling and states he was demanding the CNA, but will not explain or discuss with this RN at this time. Pt states he is no longer wanting his pain meds and he is not hurting, denies pain. Pt refusing BG checks and insulin at this time as notified all of the above to MD, pt states it is ultimately his choice to die so that this RN need not worry about it. Communication with MD and updated, states okay to refuse those for now.   "

## 2025-01-20 NOTE — PROGRESS NOTES
Hospital Medicine Daily Progress Note    Date of Service  1/20/2025    Chief Complaint  Christoph Taylor is a 59 y.o. male admitted 1/9/2025 with weakness.    Hospital Course  Christoph Taylor is a 59-year-old male with prior suicidal ideation, poorly controlled diabetes mellitus type 2, housing insecurity and severe protein calorie malnutrition.  Admitted 1/9 for acute on chronic back pain.    Patient states that he is unable to take care of himself and has a hard time administering insulin, buying food for himself, taking his blood sugars. Additionally says that he has worsening neuropathy of the feet and hands. His pain is worse than ever. He has no additional help here.    In the ER he was found to be hyperglycemic and quite weak.  UA was concerning for possible UTI.  Patient was given 1 dose of IV cefazolin.  However, he denied urinary symptoms and antibiotics were not continued.     In terms of patient's weakness-MRI thoracic and lumbar notable for old compression fracture at T12 and multiple disc bulging without quinton central canal stenosis or cord impingement.  Neurosurgery was consulted.  Dr. Johnson recommending non operative management and outpatient follow-up for possible epidural injection.  PT OT recommending postacute rehabilitation.  However, no accepting facilities were identified.  Patient intermittently refusing to work with PT here.    Home health has been arranged prior to discharge.    Interval Problem Update  1/14: Vitals stable overnight.  -129.  Patient is medically clear for discharge pending placement.    1/15: Vitals remained stable.   through 156.  Glucose ranging 104 through 246.  Case management is working hard to obtain patient's insurance card to evaluate benefits for postacute rehabilitation.  Patient is medically cleared to discharge once insurance barrier has been resolved.    1/16: Vitals stable.  Glucose this a.m. 176.  Range 2 29 through 3 05.  Increase Lantus to  25 units in the p.m.  Multiple long discussions with patient, case management and nursing staff regarding discharge plan.  Patient feels unsafe to discharge to his home.  He has 5 steps.  He does not have a ramp.  He is largely wheelchair-bound due to chronic pain.    1/17: Vital stable overnight.  -130.  Glucose improved with increase Lantus dosing.  A.m. blood sugar 104.  Patient is now being followed by complex discharge committee.    1/18: Vitals notable for SBP .  Glucose ranging 125 through 196.  Patient required 1 unit sliding scale insulin yesterday.  Patient remains medically clear for discharge though a safe discharge plan has not been arranged yet.  Encouraged ambulation at least 3 times daily.    1/19: Vital stable overnight.   through 126.  Glucose range .  Required 2 units SSI over last 24 hours.    1/20: Vitals stable overnight.  Patient endorsing depression and refusal to participate in medical therapies.  I have placed a referral for inpatient psychology/behavioral health.  Increase venlafaxine to 75 mg.  Continue to encourage participation in ambulation and patient rehabilitation.    I have discussed this patient's plan of care and discharge plan at IDT rounds today with Case Management, Nursing, Nursing leadership, and other members of the IDT team.    Consultants/Specialty  None at this time     Code Status  Full Code    Disposition  The patient is medically cleared for discharge to home or a post-acute facility.      I have placed the appropriate orders for post-discharge needs.    Review of Systems  Review of Systems   Constitutional:  Negative for fever and malaise/fatigue.   Respiratory:  Negative for shortness of breath.    Cardiovascular:  Negative for chest pain and leg swelling.   Gastrointestinal:  Negative for abdominal pain, nausea and vomiting.   Musculoskeletal:  Positive for back pain, myalgias and neck pain.   Neurological:  Positive for weakness.    Psychiatric/Behavioral:  Positive for depression.         Physical Exam  Temp:  [36.3 °C (97.4 °F)-36.8 °C (98.3 °F)] 36.3 °C (97.4 °F)  Pulse:  [63-72] 72  Resp:  [16-18] 18  BP: (117-144)/(63-84) 132/84  SpO2:  [93 %-96 %] 94 %    Physical Exam  Vitals and nursing note reviewed.   Constitutional:       General: He is not in acute distress.     Appearance: Normal appearance. He is ill-appearing.   Cardiovascular:      Rate and Rhythm: Normal rate and regular rhythm.   Pulmonary:      Effort: Pulmonary effort is normal.      Breath sounds: Normal breath sounds.   Skin:     General: Skin is warm and dry.   Neurological:      Mental Status: He is alert and oriented to person, place, and time. Mental status is at baseline.   Psychiatric:         Mood and Affect: Mood normal.         Behavior: Behavior normal.         Fluids    Intake/Output Summary (Last 24 hours) at 1/20/2025 1454  Last data filed at 1/20/2025 1401  Gross per 24 hour   Intake 1560 ml   Output 3000 ml   Net -1440 ml        Laboratory                        Imaging  MR-LUMBAR SPINE-W/O   Final Result      1.  T12 chronic compression fracture with anterior wedge deformity.   2.  Postoperative changes with interval L4-5 laminectomy since prior MRI study. There is now laminectomy at L4-5-S1 and there is now posterior fusion with transpedicular screw fixation at L4-L5. Relief of previously seen severe central stenosis at L4-5.   3.  Multilevel degenerative changes as detailed for each level above in the body of report.      MR-THORACIC SPINE-W/O   Final Result      1.  T12 old compression fracture with anterior wedge deformity. Associated mild lower thoracic kyphosis.   2.  T2 old superior endplate compression fracture.   3.  Multilevel disc bulges T3-T4, T7-T8, T8-T9, T11-12, T12-L1. Variable mass effect on the ventral thecal sac without quinton central stenosis or cord impingement at any thoracic level.   4.  No myelopathic cord signal at any thoracic  level.      DX-CHEST-PORTABLE (1 VIEW)   Final Result      No acute cardiopulmonary disease evident.           Assessment/Plan  * Compression fracture of body of thoracic vertebra (HCC)- (present on admission)  Assessment & Plan  MRI T-spine with T12 old compression fracture, T2 old superior endplate compression fracture, multiple level disc bulges T3-4 T7-T8 T8-T9, T11-T12, variable mass effect with out  central stenosis or cord impingement, MRI L-spine with T12 compression fracture, previous L4-L5 laminectomy, multilevel degenerative changes.  - Neurosurgery has been consulted  - Recommending non operative management at this time  - Continue to follow-up outpatient for possible epidural  - Continue gabapentin, lidocaine and oxycodone as needed for pain management  - PT OT recommending postacute rehabilitation  - I personally reviewed MRI images with patient at bedside 1/16    Moderate episode of recurrent major depressive disorder (HCC)  Assessment & Plan  Patient endorsing depression.  Unwillingness to participate in his rehabilitation.  Endorses feelings of helplessness and hopelessness.  Per nursing staff he has expressed SI without a plan.  He does not endorse SI to me; on 2 separate occasions.  - Continue to encourage patient to participate in his rehabilitation.  - Increase venlafaxine to 75 mg  - Inpatient behavioral health referral has been placed.  - I do not see an indication to initiate a legal hold at this point.  Patient denies SI and SI plan to me.  Per nursing staff patient does become agitated when his snacks are withheld.  We will continue to monitor the situation closely.    Bacteriuria- (present on admission)  Assessment & Plan  No abx's for now given lack of symptoms    Physical debility- (present on admission)  Assessment & Plan  MRI with compression T2 and T12 fracture, multilevel bulging disc through thoracic spine.  No cord impingement.  Neurosurgery recommended nonoperative  management  Follow-up for epidural injection as outpatient  Placement is pending    Acute hypoxic respiratory failure (HCC)- (present on admission)  Assessment & Plan  Resolved  Now on room air, continue to monitor oxygen requirement    Elevated troponin- (present on admission)  Assessment & Plan  Chronically elevated. Baseline  - no further workup    Uncontrolled type 2 diabetes mellitus with hyperglycemia (HCC)- (present on admission)  Assessment & Plan  A1C 9.8  - Continue Lantus to 25 units in the evening  - Continue sliding scale insulin; has required 2 units SSI in last 24 hours  - Patient states he is unable to drop insulin at home due to hand coordination issues.  We will try to minimize sliding scale insulin and discharged with only Lantus pens.  - Monitor BG trend.   - Accu-Cheks before meals and at bedtime.   - Goal to keep BG between 140-180 per 2019 ADA guidelines     CKD (chronic kidney disease)- (present on admission)  Assessment & Plan  Creatinine stable at 0.57       VTE prophylaxis:   SCDs/TEDs   enoxaparin ppx      I have performed a physical exam and reviewed and updated ROS and Plan today (1/20/2025). In review of yesterday's note (1/19/2025), there are no changes except as documented above.

## 2025-01-20 NOTE — CARE PLAN
The patient is Watcher - Medium risk of patient condition declining or worsening    Shift Goals  Clinical Goals: Pt will mobilize with nursing at least one time by the end of shift.  Patient Goals: pain management, snacks  Family Goals: KATHERINE    Progress made toward(s) clinical / shift goals:  Pt up to chair with nursing, pt to mobilize short distances in room, pt did mobilize with nursing at least one time by the end of shift.    Patient is not progressing towards the following goals: progressing.

## 2025-01-21 LAB
GLUCOSE BLD STRIP.AUTO-MCNC: 109 MG/DL (ref 65–99)
GLUCOSE BLD STRIP.AUTO-MCNC: 110 MG/DL (ref 65–99)
GLUCOSE BLD STRIP.AUTO-MCNC: 120 MG/DL (ref 65–99)
GLUCOSE BLD STRIP.AUTO-MCNC: 203 MG/DL (ref 65–99)
GLUCOSE BLD STRIP.AUTO-MCNC: 217 MG/DL (ref 65–99)
GLUCOSE BLD STRIP.AUTO-MCNC: 63 MG/DL (ref 65–99)

## 2025-01-21 PROCEDURE — 700111 HCHG RX REV CODE 636 W/ 250 OVERRIDE (IP): Mod: JZ | Performed by: INTERNAL MEDICINE

## 2025-01-21 PROCEDURE — 700102 HCHG RX REV CODE 250 W/ 637 OVERRIDE(OP): Performed by: INTERNAL MEDICINE

## 2025-01-21 PROCEDURE — A9270 NON-COVERED ITEM OR SERVICE: HCPCS | Performed by: INTERNAL MEDICINE

## 2025-01-21 PROCEDURE — A9270 NON-COVERED ITEM OR SERVICE: HCPCS | Performed by: STUDENT IN AN ORGANIZED HEALTH CARE EDUCATION/TRAINING PROGRAM

## 2025-01-21 PROCEDURE — 700102 HCHG RX REV CODE 250 W/ 637 OVERRIDE(OP): Performed by: STUDENT IN AN ORGANIZED HEALTH CARE EDUCATION/TRAINING PROGRAM

## 2025-01-21 PROCEDURE — 700102 HCHG RX REV CODE 250 W/ 637 OVERRIDE(OP): Performed by: NURSE PRACTITIONER

## 2025-01-21 PROCEDURE — 700101 HCHG RX REV CODE 250: Performed by: NURSE PRACTITIONER

## 2025-01-21 PROCEDURE — 99233 SBSQ HOSP IP/OBS HIGH 50: CPT | Performed by: INTERNAL MEDICINE

## 2025-01-21 PROCEDURE — 97535 SELF CARE MNGMENT TRAINING: CPT

## 2025-01-21 PROCEDURE — 82962 GLUCOSE BLOOD TEST: CPT

## 2025-01-21 PROCEDURE — A9270 NON-COVERED ITEM OR SERVICE: HCPCS

## 2025-01-21 PROCEDURE — 770006 HCHG ROOM/CARE - MED/SURG/GYN SEMI*

## 2025-01-21 PROCEDURE — A9270 NON-COVERED ITEM OR SERVICE: HCPCS | Performed by: NURSE PRACTITIONER

## 2025-01-21 PROCEDURE — 700102 HCHG RX REV CODE 250 W/ 637 OVERRIDE(OP)

## 2025-01-21 PROCEDURE — 90837 PSYTX W PT 60 MINUTES: CPT | Performed by: SOCIAL WORKER

## 2025-01-21 PROCEDURE — 97530 THERAPEUTIC ACTIVITIES: CPT

## 2025-01-21 RX ORDER — CYCLOBENZAPRINE HCL 10 MG
10 TABLET ORAL 3 TIMES DAILY
Status: DISCONTINUED | OUTPATIENT
Start: 2025-01-21 | End: 2025-01-29

## 2025-01-21 RX ADMIN — VENLAFAXINE HYDROCHLORIDE 75 MG: 75 TABLET ORAL at 06:10

## 2025-01-21 RX ADMIN — OMEPRAZOLE 40 MG: 20 CAPSULE, DELAYED RELEASE ORAL at 06:10

## 2025-01-21 RX ADMIN — INSULIN LISPRO 2 UNITS: 100 INJECTION, SOLUTION INTRAVENOUS; SUBCUTANEOUS at 12:52

## 2025-01-21 RX ADMIN — OMEPRAZOLE 40 MG: 20 CAPSULE, DELAYED RELEASE ORAL at 16:09

## 2025-01-21 RX ADMIN — GABAPENTIN 200 MG: 100 CAPSULE ORAL at 08:31

## 2025-01-21 RX ADMIN — OXYCODONE 5 MG: 5 TABLET ORAL at 16:08

## 2025-01-21 RX ADMIN — GABAPENTIN 200 MG: 100 CAPSULE ORAL at 17:27

## 2025-01-21 RX ADMIN — ENOXAPARIN SODIUM 40 MG: 100 INJECTION SUBCUTANEOUS at 16:09

## 2025-01-21 RX ADMIN — CYCLOBENZAPRINE 10 MG: 10 TABLET, FILM COATED ORAL at 17:27

## 2025-01-21 RX ADMIN — OXYCODONE 5 MG: 5 TABLET ORAL at 06:09

## 2025-01-21 RX ADMIN — LIDOCAINE 3 PATCH: 4 PATCH TOPICAL at 16:20

## 2025-01-21 RX ADMIN — CYCLOBENZAPRINE 10 MG: 10 TABLET, FILM COATED ORAL at 12:51

## 2025-01-21 RX ADMIN — GABAPENTIN 200 MG: 100 CAPSULE ORAL at 13:32

## 2025-01-21 RX ADMIN — INSULIN LISPRO 2 UNITS: 100 INJECTION, SOLUTION INTRAVENOUS; SUBCUTANEOUS at 16:15

## 2025-01-21 ASSESSMENT — ENCOUNTER SYMPTOMS
SHORTNESS OF BREATH: 0
BLURRED VISION: 0
DIAPHORESIS: 0
WEAKNESS: 1
DEPRESSION: 1
DIZZINESS: 0
FEVER: 0
PALPITATIONS: 0
ABDOMINAL PAIN: 0
NECK PAIN: 0
NAUSEA: 0
BACK PAIN: 1
NERVOUS/ANXIOUS: 0
MYALGIAS: 1

## 2025-01-21 ASSESSMENT — COGNITIVE AND FUNCTIONAL STATUS - GENERAL
HELP NEEDED FOR BATHING: A LITTLE
MOVING TO AND FROM BED TO CHAIR: A LITTLE
SUGGESTED CMS G CODE MODIFIER MOBILITY: CK
EATING MEALS: A LITTLE
CLIMB 3 TO 5 STEPS WITH RAILING: TOTAL
DRESSING REGULAR LOWER BODY CLOTHING: A LITTLE
MOBILITY SCORE: 16
DRESSING REGULAR UPPER BODY CLOTHING: A LITTLE
STANDING UP FROM CHAIR USING ARMS: A LITTLE
TOILETING: A LITTLE
WALKING IN HOSPITAL ROOM: TOTAL
SUGGESTED CMS G CODE MODIFIER DAILY ACTIVITY: CK
DAILY ACTIVITIY SCORE: 18
PERSONAL GROOMING: A LITTLE

## 2025-01-21 ASSESSMENT — ANXIETY QUESTIONNAIRES
GAD7 TOTAL SCORE: 3
4. TROUBLE RELAXING: SEVERAL DAYS
5. BEING SO RESTLESS THAT IT IS HARD TO SIT STILL: NOT AT ALL
6. BECOMING EASILY ANNOYED OR IRRITABLE: MORE THAN HALF THE DAYS
2. NOT BEING ABLE TO STOP OR CONTROL WORRYING: NOT AT ALL
7. FEELING AFRAID AS IF SOMETHING AWFUL MIGHT HAPPEN: NOT AT ALL
IF YOU CHECKED OFF ANY PROBLEMS ON THIS QUESTIONNAIRE, HOW DIFFICULT HAVE THESE PROBLEMS MADE IT FOR YOU TO DO YOUR WORK, TAKE CARE OF THINGS AT HOME, OR GET ALONG WITH OTHER PEOPLE: VERY DIFFICULT
1. FEELING NERVOUS, ANXIOUS, OR ON EDGE: NOT AT ALL
3. WORRYING TOO MUCH ABOUT DIFFERENT THINGS: NOT AT ALL

## 2025-01-21 ASSESSMENT — PAIN DESCRIPTION - PAIN TYPE
TYPE: ACUTE PAIN
TYPE: CHRONIC PAIN

## 2025-01-21 ASSESSMENT — PATIENT HEALTH QUESTIONNAIRE - PHQ9
CLINICAL INTERPRETATION OF PHQ2 SCORE: 2
5. POOR APPETITE OR OVEREATING: 1 - SEVERAL DAYS
SUM OF ALL RESPONSES TO PHQ QUESTIONS 1-9: 12

## 2025-01-21 NOTE — PROGRESS NOTES
Hospital Medicine Daily Progress Note    Date of Service  1/27/2025    Chief Complaint  Christoph Taylor is a 59 y.o. male admitted 1/9/2025 with weakness.    Hospital Course  Christoph Taylor is a 59-year-old male with prior suicidal ideation, poorly controlled diabetes mellitus type 2, housing insecurity and severe protein calorie malnutrition.  Admitted 1/9 for acute on chronic back pain.    Patient states that he is unable to take care of himself and has a hard time administering insulin, buying food for himself, taking his blood sugars. Additionally says that he has worsening neuropathy of the feet and hands. His pain is worse than ever. He has no additional help here.    In the ER he was found to be hyperglycemic and quite weak.  UA was concerning for possible UTI.  Patient was given 1 dose of IV cefazolin.  However, he denied urinary symptoms and antibiotics were not continued.     In terms of patient's weakness-MRI thoracic and lumbar notable for old compression fracture at T12 and multiple disc bulging without quinton central canal stenosis or cord impingement.  Neurosurgery was consulted.  Dr. Johnson recommending non operative management and outpatient follow-up for possible epidural injection.  PT OT recommending postacute rehabilitation.  However, no accepting facilities were identified.  Patient intermittently refusing to work with PT here.    Home health has been arranged prior to discharge.    Interval Problem Update  1/14: Vitals stable overnight.  -129.  Patient is medically clear for discharge pending placement.    1/15: Vitals remained stable.   through 156.  Glucose ranging 104 through 246.  Case management is working hard to obtain patient's insurance card to evaluate benefits for postacute rehabilitation.  Patient is medically cleared to discharge once insurance barrier has been resolved.    1/16: Vitals stable.  Glucose this a.m. 176.  Range 2 29 through 3 05.  Increase Lantus to  25 units in the p.m.  Multiple long discussions with patient, case management and nursing staff regarding discharge plan.  Patient feels unsafe to discharge to his home.  He has 5 steps.  He does not have a ramp.  He is largely wheelchair-bound due to chronic pain.    1/17: Vital stable overnight.  -130.  Glucose improved with increase Lantus dosing.  A.m. blood sugar 104.  Patient is now being followed by complex discharge committee.    1/18: Vitals notable for SBP .  Glucose ranging 125 through 196.  Patient required 1 unit sliding scale insulin yesterday.  Patient remains medically clear for discharge though a safe discharge plan has not been arranged yet.  Encouraged ambulation at least 3 times daily.    1/19: Vital stable overnight.   through 126.  Glucose range .  Required 2 units SSI over last 24 hours.    1/20: Vitals stable overnight.  Patient endorsing depression and refusal to participate in medical therapies.  I have placed a referral for inpatient psychology/behavioral health.  Increase venlafaxine to 75 mg.  Continue to encourage participation in ambulation and patient rehabilitation.    1/21 Patient able to self transfer to chair, reports ongoing low back pain, pain level of 7/10  States that he is now wheelchair-bound, pending placement,  to assist  Patient with hypoglycemic episode this a.m. was given juice, unable to self administer insulin at home, will need placement assistance    I have discussed this patient's plan of care and discharge plan at IDT rounds today with Case Management, Nursing, Nursing leadership, and other members of the IDT team.    Consultants/Specialty  None at this time     Code Status  Full Code    Disposition  The patient is not medically cleared for discharge to home or a post-acute facility.      I have placed the appropriate orders for post-discharge needs.    Review of Systems  Review of Systems   Constitutional:  Negative for  diaphoresis, fever and malaise/fatigue.   Eyes:  Negative for blurred vision.   Respiratory:  Negative for shortness of breath.    Cardiovascular:  Negative for chest pain, palpitations and leg swelling.   Gastrointestinal:  Negative for abdominal pain and nausea.   Musculoskeletal:  Positive for back pain and myalgias. Negative for neck pain.   Neurological:  Positive for weakness. Negative for dizziness.   Psychiatric/Behavioral:  Positive for depression. The patient is not nervous/anxious.         Physical Exam  Temp:  [36.2 °C (97.1 °F)-38.1 °C (100.6 °F)] 36.2 °C (97.1 °F)  Pulse:  [] 92  Resp:  [16-18] 18  BP: ()/(40-91) 99/64  SpO2:  [80 %-98 %] 94 %    Physical Exam  Vitals and nursing note reviewed.   Constitutional:       General: He is not in acute distress.     Appearance: Normal appearance. He is ill-appearing. He is not toxic-appearing or diaphoretic.   HENT:      Mouth/Throat:      Mouth: Mucous membranes are moist.   Eyes:      Extraocular Movements: Extraocular movements intact.      Pupils: Pupils are equal, round, and reactive to light.   Cardiovascular:      Rate and Rhythm: Normal rate and regular rhythm.   Pulmonary:      Effort: Pulmonary effort is normal.      Breath sounds: Normal breath sounds.   Abdominal:      General: There is no distension.      Tenderness: There is no abdominal tenderness.   Skin:     General: Skin is warm and dry.   Neurological:      Mental Status: He is alert and oriented to person, place, and time. Mental status is at baseline.      Sensory: No sensory deficit.      Motor: Weakness present.      Coordination: Coordination normal.   Psychiatric:         Mood and Affect: Mood normal.         Behavior: Behavior normal.         Fluids    Intake/Output Summary (Last 24 hours) at 1/27/2025 1326  Last data filed at 1/27/2025 0733  Gross per 24 hour   Intake 1095 ml   Output 1311 ml   Net -216 ml        Laboratory  Recent Labs     01/26/25  4104 01/27/25  0229    WBC 6.4 8.5   RBC 3.55* 3.18*   HEMOGLOBIN 9.2* 8.3*   HEMATOCRIT 29.2* 26.1*   MCV 82.3 82.1   MCH 25.9* 26.1*   MCHC 31.5* 31.8*   RDW 41.7 42.2   PLATELETCT 179 179   MPV 8.9* 9.1     Recent Labs     01/26/25  0314 01/26/25  2239 01/27/25  0222   SODIUM 143 137 134*   POTASSIUM 4.3 4.7 4.8   CHLORIDE 104 98 98   CO2 31 27 27   GLUCOSE 80 207* 215*   BUN 26* 40* 40*   CREATININE 0.72 1.34 1.34   CALCIUM 8.6 8.3* 8.0*     Recent Labs     01/26/25 2239   INR 1.04               Imaging  [id number]   Final Result      1.  Distended gallbladder with gallbladder sludge and a small calcified gallstone. No discrete sonographic evidence of acute cholecystitis.   2.  Mildly dilated main portal vein which could indicate portal hypertension      DX-CHEST-PORTABLE (1 VIEW)   Final Result      Left basilar atelectasis. Underlying infection is possible.      MR-LUMBAR SPINE-W/O   Final Result      1.  T12 chronic compression fracture with anterior wedge deformity.   2.  Postoperative changes with interval L4-5 laminectomy since prior MRI study. There is now laminectomy at L4-5-S1 and there is now posterior fusion with transpedicular screw fixation at L4-L5. Relief of previously seen severe central stenosis at L4-5.   3.  Multilevel degenerative changes as detailed for each level above in the body of report.      MR-THORACIC SPINE-W/O   Final Result      1.  T12 old compression fracture with anterior wedge deformity. Associated mild lower thoracic kyphosis.   2.  T2 old superior endplate compression fracture.   3.  Multilevel disc bulges T3-T4, T7-T8, T8-T9, T11-12, T12-L1. Variable mass effect on the ventral thecal sac without quinton central stenosis or cord impingement at any thoracic level.   4.  No myelopathic cord signal at any thoracic level.      DX-CHEST-PORTABLE (1 VIEW)   Final Result      No acute cardiopulmonary disease evident.           Assessment/Plan  * Compression fracture of body of thoracic vertebra (HCC)-  (present on admission)  Assessment & Plan  MRI T-spine with T12 old compression fracture, T2 old superior endplate compression fracture, multiple level disc bulges T3-4 T7-T8 T8-T9, T11-T12, variable mass effect with out  central stenosis or cord impingement, MRI L-spine with T12 compression fracture, previous L4-L5 laminectomy, multilevel degenerative changes.  - Neurosurgery has been consulted  - Recommending non operative management at this time  - Continue to follow-up outpatient for possible epidural  - Continue gabapentin, lidocaine and oxycodone as needed for pain management  - PT OT recommending postacute rehabilitation  - I personally reviewed MRI images with patient at bedside 1/16 1/21 placement pending, f/u with CM  - ongoing LBP, schedule flexeril    1/22 PT reeval with AFO now available  Pain control, encourage activity, placement pending    1/23 PT  with AFO, encourage activity, pending placement, CM to assist, ? GH  1/24 PT to work with patient with AFO, consult replaced  Encourage activity    1/25 patient worked with PT with AFO, encourage activity    1/26 cont activity, placement pending    1/27 difficult discharge,  assisting  Considering transfer to another state to be closer to family, patient can no longer live alone, cannot self administer insulin or take care of himself  Encourage activity  Placement pending    Sepsis (HCC)  Assessment & Plan  This is Sepsis Not present on admission  SIRS criteria identified on my evaluation include: Tachycardia, with heart rate greater than 90 BPM and Tachypnea, with respirations greater than 20 per minute  Clinical indicators of end organ dysfunction include Hypotension with systolic blood pressure less than 90 or MAP less than 65, Lactic Acid greater than 2, and Acute Respiratory Failure - (mechanical ventilation or BiPap or PaO2/FiO2 ratio < 300)  Source is pneumonia/lungs  Sepsis protocol initiated  Crystalloid Fluid Administration: Fluid  resuscitation ordered per standard protocol - 30 mL/kg per current or ideal body weight  IV antibiotics as appropriate for source of sepsis  Reassessment: I have reassessed the patient's hemodynamic status    1/27 septic protocol initiated, received 4 L of fluid hydration, improved blood pressure, lactic acidosis resolved, will follow-up blood a.m. lactic acid level, repeat is at 2  Borderline hypotension, continue fluid hydration  Patient now on IV antibiotics, follow-up cultures  Taper oxygen as tolerated, chest x-ray concerning for infiltrates    Moderate episode of recurrent major depressive disorder (HCC)  Assessment & Plan  Patient endorsing depression.  Unwillingness to participate in his rehabilitation.  Endorses feelings of helplessness and hopelessness.  Per nursing staff he has expressed SI without a plan.  He does not endorse SI to me; on 2 separate occasions.  - Continue to encourage patient to participate in his rehabilitation.  - Increase venlafaxine to 75 mg  - Inpatient behavioral health referral has been placed.  - I do not see an indication to initiate a legal hold at this point.  Patient denies SI and SI plan to me.  Per nursing staff patient does become agitated when his snacks are withheld.  We will continue to monitor the situation closely.    Bacteriuria- (present on admission)  Assessment & Plan  No abx's for now given lack of symptoms    Physical debility- (present on admission)  Assessment & Plan  MRI with compression T2 and T12 fracture, multilevel bulging disc through thoracic spine.  No cord impingement.  Neurosurgery recommended nonoperative management  Follow-up for epidural injection as outpatient  Placement is pending    1/21 Patient reports ambulatory status prior to this admission, to follow-up with orthopedic surgery for AFO evaluation  , Will reconsult PT OT for AFO as needed, will consult orthopedic surgery as needed  -Patient reports low back pain, has Zanaflex as needed, will  add Flexeril 3 times daily  Continue pain control  Encourage activity    Acute hypoxic respiratory failure (HCC)- (present on admission)  Assessment & Plan  Resolved  Now on room air, continue to monitor oxygen requirement    Elevated troponin- (present on admission)  Assessment & Plan  Chronically elevated. Baseline  - no further workup    Uncontrolled type 2 diabetes mellitus with hyperglycemia (HCC)- (present on admission)  Assessment & Plan  A1C 9.8  - Continue Lantus to 25 units in the evening  - Continue sliding scale insulin; has required 2 units SSI in last 24 hours  - Patient states he is unable to drop insulin at home due to hand coordination issues.  We will try to minimize sliding scale insulin and discharged with only Lantus pens.  - Monitor BG trend.   - Accu-Cheks before meals and at bedtime.   - Goal to keep BG between 140-180 per 2019 ADA guidelines     1/21 hypoglycemia this am, decrease Lantus to 18 units, cont ssi  - f/u am labs    1/22 bs improved, on lantus 18 units, cont and monitor  ? GH    1/23 improvng bs, increase Lantus 20 u, add metformin, monitor    1/24 improving, cont lantus, f/u am bmp, consider increasing metformin    1/25 hypoglycemia this a.m., decreased metformin and Lantus, monitor closely    1/26 improved bs, now on lantus 10u, monitor, encourage activity    1/27 improved control, continue Lantus    CKD (chronic kidney disease)- (present on admission)  Assessment & Plan  Creatinine stable at 0.57  Stable, secondary to diabetes       VTE prophylaxis:    enoxaparin ppx      I have performed a physical exam and reviewed and updated ROS and Plan today (1/27/2025). In review of yesterday's note (1/26/2025), there are no changes except as documented above.

## 2025-01-21 NOTE — THERAPY
Occupational Therapy  Daily Treatment     Patient Name: Christoph Taylor  Age:  59 y.o., Sex:  male  Medical Record #: [id number]  Today's Date: 1/21/2025     Precautions  Precautions: (P) Fall Risk    Assessment    Pt seen for follow up OT tx session directly after PT session involving transfer to chair, pt still requiring some assistance with fine motor tasks due to chronic deficits such as tying pants/opening containers and gets easily frustrated with gross grasp activities as well, per EMR unable to manage insulin injections. Pt likely requires more supportive environment due to chronic deficits deterring ability to complete ADLs.    Plan    Treatment Plan Status: (P) Modify Current Treatment Plan  Type of Treatment: Self Care / Activities of Daily Living, Adaptive Equipment, Community Re-Integration, Manual Therapy Techniques, Therapeutic Exercises, Neuro Re-Education / Balance, Therapeutic Activity, Family / Caregiver Training  Treatment Frequency: (P) 2 Times per Week  Treatment Duration: Until Therapy Goals Met    DC Equipment Recommendations: (P) Unable to determine at this time  Discharge Recommendations: (P) Other - (Likely requires more supportive environment due to chronic deficits)    Objective       01/21/25 1424   Precautions   Precautions Fall Risk   Vitals   O2 Delivery Device None - Room Air   Pain 0 - 10 Group   Therapist Pain Assessment Post Activity Pain Same as Prior to Activity;Nurse Notified   Cognition    Cognition / Consciousness WDL   Level of Consciousness Alert   Active ROM Upper Body   Active ROM Upper Body  X   Dominant Hand Right   Rt Hand Moderate Impaired   ROM Lt Hand Moderate Impaired   Balance   Sitting Balance (Static) Fair   Sitting Balance (Dynamic) Fair   Standing Balance (Static) Fair   Standing Balance (Dynamic) Fair   Weight Shift Sitting Fair   Weight Shift Standing Fair   Skilled Intervention Verbal Cuing   Activities of Daily Living   Upper Body Dressing Supervision    Lower Body Dressing Contact Guard Assist   Toileting   (NT-refused need)   Skilled Intervention Verbal Cuing;Facilitation   How much help from another person does the patient currently need...   Putting on and taking off regular lower body clothing? 3   Bathing (including washing, rinsing, and drying)? 3   Toileting, which includes using a toilet, bedpan, or urinal? 3   Putting on and taking off regular upper body clothing? 3   Taking care of personal grooming such as brushing teeth? 3   Eating meals? 3   6 Clicks Daily Activity Score 18   Functional Mobility   Bed, Chair, Wheelchair Transfer Supervised   Mobility witnessed transfer with PT, seated ADLs in chair   Skilled Intervention Verbal Cuing;Facilitation   Activity Tolerance   Sitting in Chair left seated in chair   Short Term Goals   Short Term Goal # 1 Pt will complete ADL txfs with supv   Goal Outcome # 1 Goal met   Short Term Goal # 2 Pt will complete LB dressing with supv using AE PRN   Goal Outcome # 2 Progressing as expected   Short Term Goal # 3 Pt will complete toileting ADLs with supv   Goal Outcome # 3 Goal not met   Short Term Goal # 4 Pt will complete standing g/h routine with supv   Goal Outcome # 4 Goal not met   Education Group   Education Provided Role of Occupational Therapist   Role of Occupational Therapist Patient Response Patient;Acceptance;Explanation;Verbal Demonstration   Occupational Therapy Treatment Plan    O.T. Treatment Plan Modify Current Treatment Plan   Treatment Frequency 2 Times per Week   Anticipated Discharge Equipment and Recommendations   DC Equipment Recommendations Unable to determine at this time   Discharge Recommendations Other -  (Likely requires more supportive environment due to chronic deficits)   Interdisciplinary Plan of Care Collaboration   IDT Collaboration with  Nursing   Patient Position at End of Therapy Seated;Chair Alarm On;Call Light within Reach;Tray Table within Reach;Phone within Reach    Collaboration Comments RN updated

## 2025-01-21 NOTE — THERAPY
"Physical Therapy   Discharge     Patient Name: Christoph Taylor  Age:  59 y.o., Sex:  male  Medical Record #: [id number]  Today's Date: 1/21/2025     Precautions  Precautions: Fall Risk    Assessment    Pt received in bed and agreeable to PT session. Pt completed bed mobility at a mod I level using the bed rail. Pt able to demo transfers with supervision and no AD. Pt reports using a wheelchair at home, uses LE to propel, and declined concerns with this or need to practice. Pt declined ambulation as he is still pending an AFO from [facility]. Discussed with MD. Pt is at baseline level of function in terms of mobility, but reports inability to complete self-care tasks due to chronic UE deficits. Recommend a more supportive environment or caregivers to assist, but not in need of further acute PT at this time.    Plan    Reason for Discharge From Therapy: Discharge Secondary to Goals Met    DC Equipment Recommendations: None  Discharge Recommendations:  (Recommend more supportive environment due to inability to care for self, but is at reported baseline level for mobility)      Subjective    \"I was falling too much, so I just use the wheelchair now\"     Objective       01/21/25 1408   Precautions   Precautions Fall Risk   Vitals   O2 (LPM) 0   O2 Delivery Device None - Room Air   Pain 0 - 10 Group   Therapist Pain Assessment Post Activity Pain Same as Prior to Activity;Nurse Notified  (chronic back pain, not rted)   Cognition    Level of Consciousness Alert   Comments Cooperative with session. Reports being at baseline level of mobility, but unable to care for self   Balance   Sitting Balance (Static) Fair   Sitting Balance (Dynamic) Fair   Standing Balance (Static) Fair   Standing Balance (Dynamic) Fair   Weight Shift Sitting Fair   Weight Shift Standing Fair   Skilled Intervention Verbal Cuing   Comments stand pivot transfer with no AD   Bed Mobility    Supine to Sit Modified Independent   Scooting Modified Independent "   Rolling Modified Independent   Comments use of bed rail, HOB   Functional Mobility   Sit to Stand Supervised   Bed, Chair, Wheelchair Transfer Supervised   Transfer Method Stand Pivot   Mobility bed>chair   Skilled Intervention Verbal Cuing   6 Clicks Assessment - How much HELP from from another person do you currently need... (If the patient hasn't done an activity recently, how much help from another person do you think he/she would need if he/she tried?)   Turning from your back to your side while in a flat bed without using bedrails? 4   Moving from lying on your back to sitting on the side of a flat bed without using bedrails? 4   Moving to and from a bed to a chair (including a wheelchair)? 3   Standing up from a chair using your arms (e.g., wheelchair, or bedside chair)? 3   Walking in hospital room? 1   Climbing 3-5 steps with a railing? 1   6 clicks Mobility Score 16   Short Term Goals    Short Term Goal # 1 Pt will complete complete transfer to a wheelchair with supervision to progress independence in 6 visits.   Goal Outcome # 1 Goal met   Short Term Goal # 2 Pt will propel manual wheelchair 100ft with supervision to progress independence in 6 visits.   Goal Outcome # 2   (pt declined, reports no concerns with wheelchair mobility)   Short Term Goal # 3 Pt will ambulate 10ft with FWW and supervision to access home in 6 visits.   Goal Outcome # 3   (pt declined due to not having AFO)   Physical Therapy Treatment Plan   Reason For Discharge Discharge Secondary to Goals Met   Anticipated Discharge Equipment and Recommendations   DC Equipment Recommendations None   Discharge Recommendations   (Recommend more supportive environment due to inability to care for self, but is at reported baseline level for mobility)   Interdisciplinary Plan of Care Collaboration   IDT Collaboration with  Nursing;Occupational Therapist   Patient Position at End of Therapy Seated;Chair Alarm On;Call Light within Reach;Tray Table  within Reach;Phone within Reach  (OT present)   Collaboration Comments RN updated   Session Information   Date / Session Number  1/21-2, at baseline

## 2025-01-21 NOTE — CARE PLAN
The patient is Stable - Low risk of patient condition declining or worsening    Shift Goals  Clinical Goals: Reported pain will be managed to patient target of less than 5 throughout shift  Patient Goals: pain management, snacks  Family Goals: vicente    Patient A & O x4, utilizes call light appropriately. Patient complied with all fall and skin interventions. Bed in low and locked position, call light in reach, rounded on hourly.      Progress made toward(s) clinical / shift goals:    Problem: Safety  Goal: Will remain free from injury  Outcome: Progressing     Problem: Pain Management  Goal: Pain level will decrease to patient's comfort goal  Outcome: Progressing     Problem: Infection  Goal: Will remain free from infection  Outcome: Progressing       Patient is not progressing towards the following goals:

## 2025-01-21 NOTE — CONSULTS
"[facility] Psychotherapy Session Summary (New)    Name: Christoph Taylor  MRN: [id number]  : 1965  Age: 59 y.o.  Date of assessment: 25  PCP: DIANA Tariq  Persons in attendance: Patient    HPI: Per Medical Record:  \"Christoph Taylor is a 59 y.o. male who presents to the ED via EMS for evaluation of weakness onset a week ago. The patient states he also has weakness in his legs, cough, and diarrhea for the past month, but denies any fever, runny nose, chest pain, difficulty breathing, nausea or vomiting. He describes that his pain is located in the middle of his back. Denies any radiation. He also is noting that he has been having difficulty gripping things in bilateral hands, which has made giving himself insulin difficult. He notes that his last dose of insulin was about 4 days ago. He also reports that he has had some blood in his urine, but denies any difficulty urinating. The patient states that his pain has been typical, but has been worsening, which is why he reports to the ED today. Denies any recent falls, injuries, or trauma. He notes that he is on gabapentin for his pain. Denies normally being on oxygen. The patient reports that he lives alone and does not normally get around, however when he does he uses a wheelchair. The patient has an allergy to ketamine.\" Patient was referred to Behavioral Health for a psychotherapy session.    Psychotherapy Session Summary  Christoph Taylor is known to this service as a result of psychotherapy sessions over multiple previous admissions. Patient was seen lying down on bed, alert, oriented, speech slow, low energy, flat affect, with no range of emotion. Patient is irritable and easily annoyed. Patient reports ongoing depression and suicidal ideations which appears to be the consistent presentation during each admission. Patient states he considered shooting himself with a gun but does not own one nor has access to one. " "Patient denies intent to harm himself but believes, \"people mistreat me in this hospital, no body cares about me\".  Patient gave examples of the nurse not giving him snacks stating, \"they want me to starve around her, they don't give me food when I am hungry\". Patient states, \"I know the nurse on duty now doesn't like me\". Clinician met with the RN and brought RN to bedside to challenge patients accusations and beliefs. Patient raised his voice at RN stating, \"I press my call light and nobody comes, I know its because you don't like me\".  RN explained to patient she has not had a problem with patient and has responded to all of his requests including extra snacks. RN went down the list of all the snacks she brought him this morning. Patient continues to dismiss RN's statement say, \"oh my blood sugar was low so that's ok huh, huh, all you guys care about if its high!\" Patient's is consumed with cognitive distortions and does not see life from a perspective of reality, instead he see's life through the lenses of his error thought processes. Even when given evidence to the contrary, patient continues to ruminate on his erroneous beliefs. Patient is consumed with the belief that no body cares about him.   Despite this, patient was able to agree to a no harm contact with me and repeated his willingness to remain safe in the presence of the student interns and RN.   RN reported patient is noncompliant with physical therapy and is not participating in treatment. Patient states he would participate in physical therapy with a AFO legal brace that has been ordered by his community based physician. Patient states, \"nobody listens to me, they just want me to walk and hurt myself worse than I already am!\"  Patient reports he would accept physical therapy with the brace. Patient states he does want to practice walking. Patient initially reported he was wheel chair bound but that was incorrect. Patient appears to be able to " "mobilize but due to his lack of communication it has been difficult to determine the extent of his mobility per RN.  Patients mood is consistently disagreeable and irritable. He remains angry most days, stating, \"I have nothing to look forward to\". Patient however, refuses to engage in anything that would potentially impact his life for the better. Patient continually tells himself, \"no one cares\", \"I have nothing to do or look forward to\" however patient denies any attempt to improve his life. After some conversation patient agreed to see the psychiatrist for medication management for depressive symptoms.  Consulted with physician regarding this patients current functioning and previous presentation, which appears consistent during each admission. Requested medication management order be placed for psychotherapy on patients behalf. Determined a legal hold will not be initiated. Patient is scheduled to transfer to a SNF once a bed is located. Patient reports the inability to adequately care for himself and his ability to be mobile in a significant way is limited. Patient would benefit from 24 hour care at a SNF ongoing medical care, nursing supervision, medication management including mood stabilizers, socialization to prevent further isolation, consistent meals and interactions with others if desired.    Chief Complaint   Patient presents with    Weakness     Increasing weakness last week, decreased dexterity with upper extremities baseline, thoracic back pain and worsening last x4 days, prior back surgeries, Rx Gabapentin    High Blood Sugar     With , has not been able to give self insulin last x4 days, at ER POC glucose 382        Psychotherapy Session Summary  Clinician challenged patients negative thinking patterns and belief that others are out to get him. Patient isolates himself from his family and does not seek support.  Patient is chronically depressed and has expressed suicidal thoughts during " each admission, most recent admission before today was, 12/10/24. Patient created a safety plan during that visit and did not try to harm himself at all. Patient again agreed to maintain a safety contract of no harm to self or others at this time. Patient agreed to start anti depressants and is cooperative with the discharge plan to an SNF rather than back home.    Psychiatric Review of Systems    BHUMI-7 Questionnaire  Feeling nervous, anxious, or on edge:  Not at all   Not being able to sop or control worrying:  Not at all   Worrying too much about different things:  Not at all   Trouble relaxing:  Several days   Being so restless that it's hard to sit still:  Not at all   Becoming easily annoyed or irritable:  More than half the days   Feeling afraid as if something awful might happen:  Not at all   Total:  3   How difficult  have these problems made it for you to do your work, take care of things at home, or get along with other people? - Very difficult    Interpretation of BHUMI 7 Total Score   Score Severity: 0-4 No Anxiety, 5-9 Mild Anxiety, 10-14 Moderate Anxiety, 15-21 Severe Anxiety    PHQ-9 Depression Screening    Little interest or pleasure in doing things?  1 - several days   Feeling down, depressed, or hopeless?  1 - several days   Trouble falling or staying asleep, or sleeping too much?   2 - more than half the days   Feeling tired or having little energy?  2 - more than half the days   Poor appetite or overeating?   1 - several days   Feeling bad about yourself - or that you are a failure or have let yourself or your family down?  2 - more than half the days   Trouble concentrating on things, such as reading the newspaper or watching television?  1 - several days   Moving or speaking so slowly that other people could have noticed.  Or the opposite - being so fidgety or restless that you have been moving around a lot more than usual?   1 - several days   Thoughts that you would be better off dead, or of  "hurting yourself?   1 - several days   Patient Health Questionnaire Score:  12     Interpretation of PHQ-9 Total Score   Score Severity: 1-4 No Depression, 5-9 Mild Depression, 10-14 Moderate Depression, 15-19 Moderately Severe Depression, 20-27 Severe Depression    MoCA Performed?:  N/A      Behavioral Health Treatment History  Does patient/parent report a history of prior behavioral health treatment for patient?  Patient reports depression and some thoug    SAFETY ASSESSMENT - SELF  Does patient acknowledge current or past symptoms of dangerousness to self? yes   Does parent/significant other report patient has current or past symptoms of dangerousness to self? N\A     Does presenting problem suggest symptoms of dangerousness to self?  Patient reports suicidal ideations and thoughts of a gun, no intent and is willing to agree to a no harm contract with this writer.      SAFETY ASSESSMENT - OTHERS  Does patient acknowledge current or past symptoms of aggressive behavior or risk to others? no   Does parent/significant other report patient has current or past symptoms of aggressive behavior or risk to others? N\A     Does presenting problem suggest symptoms of dangerousness to others?  No    Crisis Safety Plan completed and copy given to patient? Verbal safety plan was agreed upon during this session.    SUBSTANCE USE SCREENING  Yes:  Merlin all substances used in the past 30 days:      Last Use Amount   []   Alcohol     []   Marijuana     []   Heroin     []   Prescription Opioids  (used without prescription, for    recreation, or in excess of prescribed amount)     []   Other Prescription  (used without prescription, for    recreation, or in excess of prescribed amount)     []   Cocaine      []   Methamphetamine     []   \"\" drugs (ectasy, MDMA)     []   Other substances        UDS results: not assessed  Breathalyzer results: not assessed    What consequences does the patient associate with any of the above " substance use and or addictive behaviors? None    Risk factors for detox (check all that apply):  []  Seizures   []  Diaphoretic (sweating)   []  Tremors   []  Hallucinations   []  Increased blood pressure   []  Decreased blood pressure   []  Other   []  None      [] Patient education on risk factors for detoxification and instructed to return to ER as needed.    MENTAL STATUS  Participation Active verbal participation   Grooming Casual   Orientation Alert   Behavior Agitated   Eye contact Limited   Mood Depressed, Angry, and Irritable   Affect Congruent with content   Thought Process Circumstantial   Thought Content Rumination   Speech Soft   Perception Illogical thought patterns   Memory No gross evidence of memory deficits   Insight Limited   Judgement Limited   Other        Collateral Information:   Source: [facility] and  Other: physician; MSW interns    Unable to complete full assessment due to:  NA - Assessment completed         Primary:  Major Depressive Disorder  Secondary:  Depression due to other medical condition                             Recommendations and Observation Level:  Patient waiting for SNF    Legal Hold: N/A    Farzana Tripathi, Ph.D., [facility]  1/21/2025    Length of Intervention: 60 minutes

## 2025-01-21 NOTE — PROGRESS NOTES
"Hypoglycemia Intervention    Hypoglycemia protocol intervention:  Blood glucose 63 at 0740.  Intervention: 8 oz of fruit juice   Repeat blood glucose 110.  Intervention: Carb/Protein snack given, recheck blood glucose in 1 hour    Additional interventions needed: recheck in 1 hour  Dr. Chatterjee notified of the above.       Order comments: IF PATIENT ABLE TO EAT/DRINK Give 8 ounces fruit juice PO if FSBG is less than 70 mg/dL. Recheck FSBG in 15 minutes. If FSBG is less than 100 mg/dL, repeat with 4 ounces fruit juice PO q15 minutes prn hypoglycemia. If FSBG is greater than or equal to 100 mg/dL, give carbohydrate/protein snack or meal and recheck FSBG in 1 hour. RN Reminder: Document \"HYPOGLYCEMIAINTERVENTION\" Progress Note following any hypoglycemic event.   "

## 2025-01-21 NOTE — CARE PLAN
The patient is Watcher - Medium risk of patient condition declining or worsening    Shift Goals  Clinical Goals: Pt will mobilize with nursing at least one time by the end of shift.  Patient Goals: pain management, snacks  Family Goals: KATHERINE    Progress made toward(s) clinical / shift goals:  Pt up to chair with nursing, pt states he cannot walk because he needs his AFO that is ready at an oupt clinic. Communication with hosptialist of AFO, communication with therapies of AFO. Pt to notify this RN, psych, hospitalist, and therapies for reason pt is not walking. Pt agreeable to chair with transfer, SBA.     Patient is not progressing towards the following goals: progressing.

## 2025-01-22 LAB
ALBUMIN SERPL BCP-MCNC: 2.9 G/DL (ref 3.2–4.9)
ALBUMIN/GLOB SERPL: 1 G/DL
ALP SERPL-CCNC: 64 U/L (ref 30–99)
ALT SERPL-CCNC: 12 U/L (ref 2–50)
ANION GAP SERPL CALC-SCNC: 8 MMOL/L (ref 7–16)
AST SERPL-CCNC: 20 U/L (ref 12–45)
BILIRUB SERPL-MCNC: <0.2 MG/DL (ref 0.1–1.5)
BUN SERPL-MCNC: 22 MG/DL (ref 8–22)
CALCIUM ALBUM COR SERPL-MCNC: 9.6 MG/DL (ref 8.5–10.5)
CALCIUM SERPL-MCNC: 8.7 MG/DL (ref 8.5–10.5)
CHLORIDE SERPL-SCNC: 102 MMOL/L (ref 96–112)
CO2 SERPL-SCNC: 32 MMOL/L (ref 20–33)
CREAT SERPL-MCNC: 0.61 MG/DL (ref 0.5–1.4)
GFR SERPLBLD CREATININE-BSD FMLA CKD-EPI: 110 ML/MIN/1.73 M 2
GLOBULIN SER CALC-MCNC: 2.9 G/DL (ref 1.9–3.5)
GLUCOSE BLD STRIP.AUTO-MCNC: 103 MG/DL (ref 65–99)
GLUCOSE BLD STRIP.AUTO-MCNC: 187 MG/DL (ref 65–99)
GLUCOSE BLD STRIP.AUTO-MCNC: 195 MG/DL (ref 65–99)
GLUCOSE BLD STRIP.AUTO-MCNC: 220 MG/DL (ref 65–99)
GLUCOSE SERPL-MCNC: 69 MG/DL (ref 65–99)
POTASSIUM SERPL-SCNC: 3.7 MMOL/L (ref 3.6–5.5)
PROT SERPL-MCNC: 5.8 G/DL (ref 6–8.2)
SODIUM SERPL-SCNC: 142 MMOL/L (ref 135–145)

## 2025-01-22 PROCEDURE — 700102 HCHG RX REV CODE 250 W/ 637 OVERRIDE(OP): Performed by: INTERNAL MEDICINE

## 2025-01-22 PROCEDURE — 700101 HCHG RX REV CODE 250: Performed by: NURSE PRACTITIONER

## 2025-01-22 PROCEDURE — 82962 GLUCOSE BLOOD TEST: CPT | Mod: 91

## 2025-01-22 PROCEDURE — 770006 HCHG ROOM/CARE - MED/SURG/GYN SEMI*

## 2025-01-22 PROCEDURE — A9270 NON-COVERED ITEM OR SERVICE: HCPCS

## 2025-01-22 PROCEDURE — A9270 NON-COVERED ITEM OR SERVICE: HCPCS | Performed by: STUDENT IN AN ORGANIZED HEALTH CARE EDUCATION/TRAINING PROGRAM

## 2025-01-22 PROCEDURE — 700111 HCHG RX REV CODE 636 W/ 250 OVERRIDE (IP): Mod: JZ | Performed by: INTERNAL MEDICINE

## 2025-01-22 PROCEDURE — 36415 COLL VENOUS BLD VENIPUNCTURE: CPT

## 2025-01-22 PROCEDURE — 700102 HCHG RX REV CODE 250 W/ 637 OVERRIDE(OP): Performed by: STUDENT IN AN ORGANIZED HEALTH CARE EDUCATION/TRAINING PROGRAM

## 2025-01-22 PROCEDURE — A9270 NON-COVERED ITEM OR SERVICE: HCPCS | Performed by: INTERNAL MEDICINE

## 2025-01-22 PROCEDURE — 700102 HCHG RX REV CODE 250 W/ 637 OVERRIDE(OP)

## 2025-01-22 PROCEDURE — A9270 NON-COVERED ITEM OR SERVICE: HCPCS | Performed by: NURSE PRACTITIONER

## 2025-01-22 PROCEDURE — 99232 SBSQ HOSP IP/OBS MODERATE 35: CPT | Performed by: INTERNAL MEDICINE

## 2025-01-22 PROCEDURE — 80053 COMPREHEN METABOLIC PANEL: CPT

## 2025-01-22 PROCEDURE — 700102 HCHG RX REV CODE 250 W/ 637 OVERRIDE(OP): Performed by: NURSE PRACTITIONER

## 2025-01-22 RX ADMIN — INSULIN LISPRO 1 UNITS: 100 INJECTION, SOLUTION INTRAVENOUS; SUBCUTANEOUS at 17:11

## 2025-01-22 RX ADMIN — CYCLOBENZAPRINE 10 MG: 10 TABLET, FILM COATED ORAL at 12:39

## 2025-01-22 RX ADMIN — INSULIN LISPRO 2 UNITS: 100 INJECTION, SOLUTION INTRAVENOUS; SUBCUTANEOUS at 20:42

## 2025-01-22 RX ADMIN — CYCLOBENZAPRINE 10 MG: 10 TABLET, FILM COATED ORAL at 05:35

## 2025-01-22 RX ADMIN — OXYCODONE 5 MG: 5 TABLET ORAL at 14:40

## 2025-01-22 RX ADMIN — GABAPENTIN 200 MG: 100 CAPSULE ORAL at 12:39

## 2025-01-22 RX ADMIN — GABAPENTIN 200 MG: 100 CAPSULE ORAL at 17:10

## 2025-01-22 RX ADMIN — TIZANIDINE 2 MG: 4 TABLET ORAL at 17:17

## 2025-01-22 RX ADMIN — VENLAFAXINE HYDROCHLORIDE 75 MG: 75 TABLET ORAL at 05:35

## 2025-01-22 RX ADMIN — OMEPRAZOLE 40 MG: 20 CAPSULE, DELAYED RELEASE ORAL at 17:10

## 2025-01-22 RX ADMIN — CYCLOBENZAPRINE 10 MG: 10 TABLET, FILM COATED ORAL at 17:10

## 2025-01-22 RX ADMIN — ENOXAPARIN SODIUM 40 MG: 100 INJECTION SUBCUTANEOUS at 17:11

## 2025-01-22 RX ADMIN — INSULIN LISPRO 1 UNITS: 100 INJECTION, SOLUTION INTRAVENOUS; SUBCUTANEOUS at 12:43

## 2025-01-22 RX ADMIN — OMEPRAZOLE 40 MG: 20 CAPSULE, DELAYED RELEASE ORAL at 05:35

## 2025-01-22 RX ADMIN — GABAPENTIN 200 MG: 100 CAPSULE ORAL at 05:35

## 2025-01-22 RX ADMIN — LIDOCAINE 3 PATCH: 4 PATCH TOPICAL at 20:32

## 2025-01-22 RX ADMIN — OXYCODONE 5 MG: 5 TABLET ORAL at 03:02

## 2025-01-22 ASSESSMENT — ENCOUNTER SYMPTOMS
MYALGIAS: 1
NECK PAIN: 0
SHORTNESS OF BREATH: 0
FEVER: 0
DIZZINESS: 0
WEAKNESS: 1
PALPITATIONS: 0
HEARTBURN: 0
HEADACHES: 0
NAUSEA: 0
DEPRESSION: 1
BLURRED VISION: 0
DOUBLE VISION: 0
NERVOUS/ANXIOUS: 0
ABDOMINAL PAIN: 0
BACK PAIN: 1

## 2025-01-22 ASSESSMENT — PAIN DESCRIPTION - PAIN TYPE
TYPE: ACUTE PAIN
TYPE: ACUTE PAIN;CHRONIC PAIN
TYPE: ACUTE PAIN
TYPE: ACUTE PAIN;CHRONIC PAIN
TYPE: ACUTE PAIN;CHRONIC PAIN
TYPE: ACUTE PAIN

## 2025-01-22 NOTE — DOCUMENTATION QUERY
[facility]                                                                       Query Response Note      PATIENT:               BAILEE JACOBSON  ACCT #:                  [id number]  MRN:                     [id number]  :                      1965  ADMIT DATE:       2025 11:40 AM  DISCH DATE:          RESPONDING  PROVIDER #:        [id number]           QUERY TEXT:    Acute hypoxic respiratory failure is documented in the Medical Record.  Patient is noted to have an SpO2 down to upper 70s off oxygen, but with normal pulmonary effort and no respiratory distress.   Weaned to RA on 1/10.    After further study, can the diagnosis of acute respiratory failure be clarified with supportive clinical indicators?    The patient's Clinical Indicators include:  H&P: Acute hypoxic respiratory failure: SpO2 down to upper 70s off oxygen.  Viral panel negative.  CXR negative.    Pulmonary: pulmonary effort is normal.  No respiratory distress.  Normal breath sounds   Respiratory: positive for cough (dry).  Negative for shortness of breath and wheezing.  Vitals Flowsheet: Admit SpO2 81% on RA, RR 12; 97% on 2L, RR 15, 79% on RA, 95% on 2L; 1/10 SpO2 97% on RA   Risk Factors: pain/ weakness   Treatment: supplemental O2, IS     Important Note:  if new diagnosis or change to documentation made via query please include in daily documentation and/or DC Summary    Thank you,  Sue Mcneal RN, BSN, CCDS  Clinical   Connect via [employer]  Options provided:   -- Acute respiratory failure is ruled out, hypoxia ruled in   -- Acute hypoxic respiratory failure is confirmed (please document additional clinical indicators)   -- Other explanation, (please specify other explanation)      Query created by: Sue Mcneal on 1/15/2025 1:29 PM    RESPONSE TEXT:    Acute respiratory failure is ruled out, hypoxia ruled in           Electronically signed by:  RICHARD APARICIO DO 1/22/2025 3:43 PM

## 2025-01-22 NOTE — DISCHARGE PLANNING
Case Management Discharge Planning    Admission Date: 1/9/2025  GMLOS: 5.2  ALOS: 12    6-Clicks ADL Score: 18  6-Clicks Mobility Score: 16      Anticipated Discharge Dispo: Discharge Disposition: Discharged to home/self care (01)    This RN CM completed chart review and spoke with the patient. This RN CM discussed with patient that he is over income for medicaid but would qualify for institutional Medicaid for long term care. Patient is interested in SNF long term placement. As patient is stating he cannot self administer insulin related to weakness in his fingers for GH placement.     This RN CM called leslie Olmos and left a voicemail for follow up.     This RN CM called sister Delia and discussed in order to qualify Elsie for institutional medicaid he would need to sell his house. Sister Delia would be willing to help with selling elsie's house if he is agreeable. This RN CM discussed with her that we would also need family support until edvin house is sold for family to provide care to Elsie.     Delia updated that she is taking care of her  with ALS and he is nonverbal and needs assistance with transfers. She is not able to take on caring for another person. Delia will follow up with elsie's brother in [place] and Son Nickolas on if they are able to assist with caring for elsie until patient's house is sold.      This RN CM went to bedside and updated patient that in order to qualify for institutional medicaid he would need to sell his Mobile Home patient verbalized approval. This RN CM also updated that with institutional medicaid his income thru [employer] would go towards his care and patient would only receive $145/month. Patient verbalized understanding and was agreeable moving forward with plan for institutional medicaid.

## 2025-01-22 NOTE — CARE PLAN
The patient is Stable - Low risk of patient condition declining or worsening    Shift Goals  Clinical Goals: Patient blood sugars will be managed to appropriate levels.  Patient Goals: pain management  Family Goals: vicente    Patient A & O x4, utilizes call light appropriately. Bed in low and locked position, call light in reach, rounded on hourly.     Progress made toward(s) clinical / shift goals:    Problem: Safety  Goal: Will remain free from falls  Outcome: Progressing     Problem: Safety  Goal: Will remain free from injury  Outcome: Progressing       Patient is not progressing towards the following goals:       oral

## 2025-01-22 NOTE — PROGRESS NOTES
Hospital Medicine Daily Progress Note    Date of Service  1/22/2025    Chief Complaint  Christoph Taylor is a 59 y.o. male admitted 1/9/2025 with weakness.    Hospital Course  Christoph Taylor is a 59-year-old male with prior suicidal ideation, poorly controlled diabetes mellitus type 2, housing insecurity and severe protein calorie malnutrition.  Admitted 1/9 for acute on chronic back pain.    Patient states that he is unable to take care of himself and has a hard time administering insulin, buying food for himself, taking his blood sugars. Additionally says that he has worsening neuropathy of the feet and hands. His pain is worse than ever. He has no additional help here.    In the ER he was found to be hyperglycemic and quite weak.  UA was concerning for possible UTI.  Patient was given 1 dose of IV cefazolin.  However, he denied urinary symptoms and antibiotics were not continued.     In terms of patient's weakness-MRI thoracic and lumbar notable for old compression fracture at T12 and multiple disc bulging without quinton central canal stenosis or cord impingement.  Neurosurgery was consulted.  Dr. Johnson recommending non operative management and outpatient follow-up for possible epidural injection.  PT OT recommending postacute rehabilitation.  However, no accepting facilities were identified.  Patient intermittently refusing to work with PT here.    Home health has been arranged prior to discharge.    Interval Problem Update  1/14: Vitals stable overnight.  -129.  Patient is medically clear for discharge pending placement.    1/15: Vitals remained stable.   through 156.  Glucose ranging 104 through 246.  Case management is working hard to obtain patient's insurance card to evaluate benefits for postacute rehabilitation.  Patient is medically cleared to discharge once insurance barrier has been resolved.    1/16: Vitals stable.  Glucose this a.m. 176.  Range 2 29 through 3 05.  Increase Lantus to  25 units in the p.m.  Multiple long discussions with patient, case management and nursing staff regarding discharge plan.  Patient feels unsafe to discharge to his home.  He has 5 steps.  He does not have a ramp.  He is largely wheelchair-bound due to chronic pain.    1/17: Vital stable overnight.  -130.  Glucose improved with increase Lantus dosing.  A.m. blood sugar 104.  Patient is now being followed by complex discharge committee.    1/18: Vitals notable for SBP .  Glucose ranging 125 through 196.  Patient required 1 unit sliding scale insulin yesterday.  Patient remains medically clear for discharge though a safe discharge plan has not been arranged yet.  Encouraged ambulation at least 3 times daily.    1/19: Vital stable overnight.   through 126.  Glucose range .  Required 2 units SSI over last 24 hours.    1/20: Vitals stable overnight.  Patient endorsing depression and refusal to participate in medical therapies.  I have placed a referral for inpatient psychology/behavioral health.  Increase venlafaxine to 75 mg.  Continue to encourage participation in ambulation and patient rehabilitation.    1/21 Patient able to self transfer to chair, reports ongoing low back pain, pain level of 7/10  States that he is now wheelchair-bound, pending placement,  to assist  Patient with hypoglycemic episode this a.m. was given juice, unable to self administer insulin at home, will need placement assistance    1/22 no new events, sleeping arousable, AFO delivered for PT reeval    I have discussed this patient's plan of care and discharge plan at IDT rounds today with Case Management, Nursing, Nursing leadership, and other members of the IDT team.    Consultants/Specialty  None at this time     Code Status  Full Code    Disposition  The patient is not medically cleared for discharge to home or a post-acute facility.      I have placed the appropriate orders for post-discharge  needs.    Review of Systems  Review of Systems   Constitutional:  Negative for fever and malaise/fatigue.   Eyes:  Negative for blurred vision and double vision.   Respiratory:  Negative for shortness of breath.    Cardiovascular:  Negative for palpitations and leg swelling.   Gastrointestinal:  Negative for abdominal pain, heartburn and nausea.   Musculoskeletal:  Positive for back pain and myalgias. Negative for neck pain.   Neurological:  Positive for weakness. Negative for dizziness and headaches.   Psychiatric/Behavioral:  Positive for depression. The patient is not nervous/anxious.         Physical Exam  Temp:  [35.9 °C (96.6 °F)-36.4 °C (97.5 °F)] 35.9 °C (96.6 °F)  Pulse:  [66-82] 82  Resp:  [14-18] 16  BP: (100-156)/(51-95) 141/91  SpO2:  [90 %-100 %] 90 %    Physical Exam  Vitals and nursing note reviewed.   Constitutional:       Appearance: Normal appearance. He is ill-appearing. He is not diaphoretic.   HENT:      Mouth/Throat:      Mouth: Mucous membranes are moist.   Eyes:      Extraocular Movements: Extraocular movements intact.      Pupils: Pupils are equal, round, and reactive to light.   Cardiovascular:      Rate and Rhythm: Normal rate and regular rhythm.   Pulmonary:      Effort: Pulmonary effort is normal.      Breath sounds: Normal breath sounds.   Abdominal:      General: There is no distension.      Palpations: There is no mass.      Tenderness: There is no abdominal tenderness.   Skin:     General: Skin is warm and dry.   Neurological:      Mental Status: He is alert and oriented to person, place, and time. Mental status is at baseline.      Cranial Nerves: No cranial nerve deficit.      Sensory: No sensory deficit.      Motor: Weakness present.   Psychiatric:         Mood and Affect: Mood normal.         Behavior: Behavior normal.         Fluids    Intake/Output Summary (Last 24 hours) at 1/22/2025 1517  Last data filed at 1/22/2025 1015  Gross per 24 hour   Intake 860 ml   Output 2600 ml    Net -1740 ml        Laboratory      Recent Labs     01/22/25  0355   SODIUM 142   POTASSIUM 3.7   CHLORIDE 102   CO2 32   GLUCOSE 69   BUN 22   CREATININE 0.61   CALCIUM 8.7                   Imaging  MR-LUMBAR SPINE-W/O   Final Result      1.  T12 chronic compression fracture with anterior wedge deformity.   2.  Postoperative changes with interval L4-5 laminectomy since prior MRI study. There is now laminectomy at L4-5-S1 and there is now posterior fusion with transpedicular screw fixation at L4-L5. Relief of previously seen severe central stenosis at L4-5.   3.  Multilevel degenerative changes as detailed for each level above in the body of report.      MR-THORACIC SPINE-W/O   Final Result      1.  T12 old compression fracture with anterior wedge deformity. Associated mild lower thoracic kyphosis.   2.  T2 old superior endplate compression fracture.   3.  Multilevel disc bulges T3-T4, T7-T8, T8-T9, T11-12, T12-L1. Variable mass effect on the ventral thecal sac without quinton central stenosis or cord impingement at any thoracic level.   4.  No myelopathic cord signal at any thoracic level.      DX-CHEST-PORTABLE (1 VIEW)   Final Result      No acute cardiopulmonary disease evident.           Assessment/Plan  * Compression fracture of body of thoracic vertebra (HCC)- (present on admission)  Assessment & Plan  MRI T-spine with T12 old compression fracture, T2 old superior endplate compression fracture, multiple level disc bulges T3-4 T7-T8 T8-T9, T11-T12, variable mass effect with out  central stenosis or cord impingement, MRI L-spine with T12 compression fracture, previous L4-L5 laminectomy, multilevel degenerative changes.  - Neurosurgery has been consulted  - Recommending non operative management at this time  - Continue to follow-up outpatient for possible epidural  - Continue gabapentin, lidocaine and oxycodone as needed for pain management  - PT OT recommending postacute rehabilitation  - I personally reviewed  MRI images with patient at bedside 1/16 1/21 placement pending, f/u with CM  - ongoing LBP, schedule flexeril    1/22 PT reeval with AFO now available  Pain control, encourage activity, placement pending    Moderate episode of recurrent major depressive disorder (HCC)  Assessment & Plan  Patient endorsing depression.  Unwillingness to participate in his rehabilitation.  Endorses feelings of helplessness and hopelessness.  Per nursing staff he has expressed SI without a plan.  He does not endorse SI to me; on 2 separate occasions.  - Continue to encourage patient to participate in his rehabilitation.  - Increase venlafaxine to 75 mg  - Inpatient behavioral health referral has been placed.  - I do not see an indication to initiate a legal hold at this point.  Patient denies SI and SI plan to me.  Per nursing staff patient does become agitated when his snacks are withheld.  We will continue to monitor the situation closely.    Bacteriuria- (present on admission)  Assessment & Plan  No abx's for now given lack of symptoms    Physical debility- (present on admission)  Assessment & Plan  MRI with compression T2 and T12 fracture, multilevel bulging disc through thoracic spine.  No cord impingement.  Neurosurgery recommended nonoperative management  Follow-up for epidural injection as outpatient  Placement is pending    1/21 Patient reports ambulatory status prior to this admission, to follow-up with orthopedic surgery for AFO evaluation  , Will reconsult PT OT for AFO as needed, will consult orthopedic surgery as needed  -Patient reports low back pain, has Zanaflex as needed, will add Flexeril 3 times daily  Continue pain control  Encourage activity    Acute hypoxic respiratory failure (HCC)- (present on admission)  Assessment & Plan  Resolved  Now on room air, continue to monitor oxygen requirement    Elevated troponin- (present on admission)  Assessment & Plan  Chronically elevated. Baseline  - no further  workup    Uncontrolled type 2 diabetes mellitus with hyperglycemia (HCC)- (present on admission)  Assessment & Plan  A1C 9.8  - Continue Lantus to 25 units in the evening  - Continue sliding scale insulin; has required 2 units SSI in last 24 hours  - Patient states he is unable to drop insulin at home due to hand coordination issues.  We will try to minimize sliding scale insulin and discharged with only Lantus pens.  - Monitor BG trend.   - Accu-Cheks before meals and at bedtime.   - Goal to keep BG between 140-180 per 2019 ADA guidelines     1/21 hypoglycemia this am, decrease Lantus to 18 units, cont ssi  - f/u am labs    1/22 bs improved, on lantus 18 units, cont and monitor  ? GH    CKD (chronic kidney disease)- (present on admission)  Assessment & Plan  Creatinine stable at 0.57       VTE prophylaxis:   SCDs/TEDs      I have performed a physical exam and reviewed and updated ROS and Plan today (1/22/2025). In review of yesterday's note (1/21/2025), there are no changes except as documented above.

## 2025-01-22 NOTE — PROGRESS NOTES
Pt to call [employer] with RN assist, pt told they will deliver tomorrow to pt room and will do the final fitting in the room, no time given for AFO

## 2025-01-23 LAB
GLUCOSE BLD STRIP.AUTO-MCNC: 111 MG/DL (ref 65–99)
GLUCOSE BLD STRIP.AUTO-MCNC: 134 MG/DL (ref 65–99)
GLUCOSE BLD STRIP.AUTO-MCNC: 165 MG/DL (ref 65–99)
GLUCOSE BLD STRIP.AUTO-MCNC: 167 MG/DL (ref 65–99)

## 2025-01-23 PROCEDURE — A9270 NON-COVERED ITEM OR SERVICE: HCPCS | Performed by: STUDENT IN AN ORGANIZED HEALTH CARE EDUCATION/TRAINING PROGRAM

## 2025-01-23 PROCEDURE — A9270 NON-COVERED ITEM OR SERVICE: HCPCS | Performed by: INTERNAL MEDICINE

## 2025-01-23 PROCEDURE — 700111 HCHG RX REV CODE 636 W/ 250 OVERRIDE (IP): Mod: JZ | Performed by: INTERNAL MEDICINE

## 2025-01-23 PROCEDURE — A9270 NON-COVERED ITEM OR SERVICE: HCPCS | Performed by: NURSE PRACTITIONER

## 2025-01-23 PROCEDURE — 700102 HCHG RX REV CODE 250 W/ 637 OVERRIDE(OP): Performed by: INTERNAL MEDICINE

## 2025-01-23 PROCEDURE — 90834 PSYTX W PT 45 MINUTES: CPT | Performed by: SOCIAL WORKER

## 2025-01-23 PROCEDURE — 700102 HCHG RX REV CODE 250 W/ 637 OVERRIDE(OP): Performed by: STUDENT IN AN ORGANIZED HEALTH CARE EDUCATION/TRAINING PROGRAM

## 2025-01-23 PROCEDURE — 99232 SBSQ HOSP IP/OBS MODERATE 35: CPT | Performed by: INTERNAL MEDICINE

## 2025-01-23 PROCEDURE — 700102 HCHG RX REV CODE 250 W/ 637 OVERRIDE(OP): Performed by: NURSE PRACTITIONER

## 2025-01-23 PROCEDURE — A9270 NON-COVERED ITEM OR SERVICE: HCPCS

## 2025-01-23 PROCEDURE — 700101 HCHG RX REV CODE 250: Performed by: NURSE PRACTITIONER

## 2025-01-23 PROCEDURE — 700102 HCHG RX REV CODE 250 W/ 637 OVERRIDE(OP)

## 2025-01-23 PROCEDURE — 770006 HCHG ROOM/CARE - MED/SURG/GYN SEMI*

## 2025-01-23 PROCEDURE — 82962 GLUCOSE BLOOD TEST: CPT

## 2025-01-23 RX ADMIN — ENOXAPARIN SODIUM 40 MG: 100 INJECTION SUBCUTANEOUS at 17:08

## 2025-01-23 RX ADMIN — INSULIN GLARGINE-YFGN 20 UNITS: 100 INJECTION, SOLUTION SUBCUTANEOUS at 17:08

## 2025-01-23 RX ADMIN — OMEPRAZOLE 40 MG: 20 CAPSULE, DELAYED RELEASE ORAL at 17:08

## 2025-01-23 RX ADMIN — VENLAFAXINE HYDROCHLORIDE 75 MG: 75 TABLET ORAL at 05:47

## 2025-01-23 RX ADMIN — INSULIN LISPRO 1 UNITS: 100 INJECTION, SOLUTION INTRAVENOUS; SUBCUTANEOUS at 21:07

## 2025-01-23 RX ADMIN — GABAPENTIN 200 MG: 100 CAPSULE ORAL at 12:26

## 2025-01-23 RX ADMIN — LIDOCAINE 3 PATCH: 4 PATCH TOPICAL at 17:08

## 2025-01-23 RX ADMIN — OMEPRAZOLE 40 MG: 20 CAPSULE, DELAYED RELEASE ORAL at 05:46

## 2025-01-23 RX ADMIN — OXYCODONE 5 MG: 5 TABLET ORAL at 17:26

## 2025-01-23 RX ADMIN — GABAPENTIN 200 MG: 100 CAPSULE ORAL at 05:47

## 2025-01-23 RX ADMIN — OXYCODONE 5 MG: 5 TABLET ORAL at 05:48

## 2025-01-23 RX ADMIN — METFORMIN HYDROCHLORIDE 500 MG: 500 TABLET ORAL at 17:28

## 2025-01-23 RX ADMIN — OXYCODONE 5 MG: 5 TABLET ORAL at 21:03

## 2025-01-23 RX ADMIN — GABAPENTIN 200 MG: 100 CAPSULE ORAL at 17:08

## 2025-01-23 RX ADMIN — INSULIN LISPRO 1 UNITS: 100 INJECTION, SOLUTION INTRAVENOUS; SUBCUTANEOUS at 17:08

## 2025-01-23 RX ADMIN — CYCLOBENZAPRINE 10 MG: 10 TABLET, FILM COATED ORAL at 17:08

## 2025-01-23 RX ADMIN — METFORMIN HYDROCHLORIDE 500 MG: 500 TABLET ORAL at 09:03

## 2025-01-23 RX ADMIN — CYCLOBENZAPRINE 10 MG: 10 TABLET, FILM COATED ORAL at 05:47

## 2025-01-23 RX ADMIN — CYCLOBENZAPRINE 10 MG: 10 TABLET, FILM COATED ORAL at 12:26

## 2025-01-23 ASSESSMENT — ENCOUNTER SYMPTOMS
WEAKNESS: 1
NERVOUS/ANXIOUS: 0
SHORTNESS OF BREATH: 0
NECK PAIN: 0
COUGH: 0
PALPITATIONS: 0
ABDOMINAL PAIN: 0
NAUSEA: 0
DEPRESSION: 1
FEVER: 0
MYALGIAS: 1
BACK PAIN: 1
HEADACHES: 0
DIAPHORESIS: 0

## 2025-01-23 ASSESSMENT — PAIN DESCRIPTION - PAIN TYPE
TYPE: ACUTE PAIN

## 2025-01-23 NOTE — CARE PLAN
The patient is Stable - Low risk of patient condition declining or worsening    Shift Goals  Clinical Goals: pain less than 5 post-intervention  Patient Goals: rest, comfort  Family Goals: vicente    Progress made toward(s) clinical / shift goals:  pt is a&o x4. O2 sats maintained on room air. IV patent and saline locked. No reports of pain at this time. Pt up to commode x1 assist this shift. All needs currently addressed. Bed alarm on.    Patient is not progressing towards the following goals:

## 2025-01-23 NOTE — PROGRESS NOTES
Hospital Medicine Daily Progress Note    Date of Service  1/23/2025    Chief Complaint  Christoph Taylor is a 59 y.o. male admitted 1/9/2025 with weakness.    Hospital Course  Christoph Taylor is a 59-year-old male with prior suicidal ideation, poorly controlled diabetes mellitus type 2, housing insecurity and severe protein calorie malnutrition.  Admitted 1/9 for acute on chronic back pain.    Patient states that he is unable to take care of himself and has a hard time administering insulin, buying food for himself, taking his blood sugars. Additionally says that he has worsening neuropathy of the feet and hands. His pain is worse than ever. He has no additional help here.    In the ER he was found to be hyperglycemic and quite weak.  UA was concerning for possible UTI.  Patient was given 1 dose of IV cefazolin.  However, he denied urinary symptoms and antibiotics were not continued.     In terms of patient's weakness-MRI thoracic and lumbar notable for old compression fracture at T12 and multiple disc bulging without quinton central canal stenosis or cord impingement.  Neurosurgery was consulted.  Dr. Johnson recommending non operative management and outpatient follow-up for possible epidural injection.  PT OT recommending postacute rehabilitation.  However, no accepting facilities were identified.  Patient intermittently refusing to work with PT here.    Home health has been arranged prior to discharge.    Interval Problem Update  1/14: Vitals stable overnight.  -129.  Patient is medically clear for discharge pending placement.    1/15: Vitals remained stable.   through 156.  Glucose ranging 104 through 246.  Case management is working hard to obtain patient's insurance card to evaluate benefits for postacute rehabilitation.  Patient is medically cleared to discharge once insurance barrier has been resolved.    1/16: Vitals stable.  Glucose this a.m. 176.  Range 2 29 through 3 05.  Increase Lantus to  25 units in the p.m.  Multiple long discussions with patient, case management and nursing staff regarding discharge plan.  Patient feels unsafe to discharge to his home.  He has 5 steps.  He does not have a ramp.  He is largely wheelchair-bound due to chronic pain.    1/17: Vital stable overnight.  -130.  Glucose improved with increase Lantus dosing.  A.m. blood sugar 104.  Patient is now being followed by complex discharge committee.    1/18: Vitals notable for SBP .  Glucose ranging 125 through 196.  Patient required 1 unit sliding scale insulin yesterday.  Patient remains medically clear for discharge though a safe discharge plan has not been arranged yet.  Encouraged ambulation at least 3 times daily.    1/19: Vital stable overnight.   through 126.  Glucose range .  Required 2 units SSI over last 24 hours.    1/20: Vitals stable overnight.  Patient endorsing depression and refusal to participate in medical therapies.  I have placed a referral for inpatient psychology/behavioral health.  Increase venlafaxine to 75 mg.  Continue to encourage participation in ambulation and patient rehabilitation.    1/21 Patient able to self transfer to chair, reports ongoing low back pain, pain level of 7/10  States that he is now wheelchair-bound, pending placement,  to assist  Patient with hypoglycemic episode this a.m. was given juice, unable to self administer insulin at home, will need placement assistance    1/22 no new events, sleeping arousable, AFO delivered for PT reeval    1/23 reports low back pain, able to transfer to chair, PT blanca    I have discussed this patient's plan of care and discharge plan at IDT rounds today with Case Management, Nursing, Nursing leadership, and other members of the IDT team.    Consultants/Specialty  None at this time     Code Status  Full Code    Disposition  The patient is not medically cleared for discharge to home or a post-acute facility.      I  have placed the appropriate orders for post-discharge needs.    Review of Systems  Review of Systems   Constitutional:  Negative for diaphoresis, fever and malaise/fatigue.   Respiratory:  Negative for cough and shortness of breath.    Cardiovascular:  Negative for palpitations and leg swelling.   Gastrointestinal:  Negative for abdominal pain and nausea.   Musculoskeletal:  Positive for back pain and myalgias. Negative for neck pain.   Neurological:  Positive for weakness. Negative for headaches.   Psychiatric/Behavioral:  Positive for depression. The patient is not nervous/anxious.         Physical Exam  Temp:  [35.9 °C (96.6 °F)-36.4 °C (97.6 °F)] 35.9 °C (96.6 °F)  Pulse:  [64-71] 64  Resp:  [16-18] 17  BP: (100-119)/(57-72) 119/72  SpO2:  [92 %-95 %] 95 %    Physical Exam  Vitals and nursing note reviewed.   Constitutional:       General: He is not in acute distress.     Appearance: Normal appearance. He is ill-appearing.   HENT:      Mouth/Throat:      Mouth: Mucous membranes are moist.   Eyes:      Extraocular Movements: Extraocular movements intact.      Pupils: Pupils are equal, round, and reactive to light.   Cardiovascular:      Rate and Rhythm: Normal rate and regular rhythm.   Pulmonary:      Effort: Pulmonary effort is normal.      Breath sounds: Normal breath sounds.   Abdominal:      General: There is no distension.      Palpations: There is no mass.   Skin:     General: Skin is warm and dry.   Neurological:      Mental Status: He is alert and oriented to person, place, and time. Mental status is at baseline.      Cranial Nerves: No cranial nerve deficit.      Sensory: No sensory deficit.      Motor: Weakness present.      Coordination: Coordination normal.   Psychiatric:         Mood and Affect: Mood normal.         Behavior: Behavior normal.         Fluids    Intake/Output Summary (Last 24 hours) at 1/23/2025 1241  Last data filed at 1/23/2025 1100  Gross per 24 hour   Intake 1000 ml   Output 1750  ml   Net -750 ml        Laboratory      Recent Labs     01/22/25  0355   SODIUM 142   POTASSIUM 3.7   CHLORIDE 102   CO2 32   GLUCOSE 69   BUN 22   CREATININE 0.61   CALCIUM 8.7                   Imaging  MR-LUMBAR SPINE-W/O   Final Result      1.  T12 chronic compression fracture with anterior wedge deformity.   2.  Postoperative changes with interval L4-5 laminectomy since prior MRI study. There is now laminectomy at L4-5-S1 and there is now posterior fusion with transpedicular screw fixation at L4-L5. Relief of previously seen severe central stenosis at L4-5.   3.  Multilevel degenerative changes as detailed for each level above in the body of report.      MR-THORACIC SPINE-W/O   Final Result      1.  T12 old compression fracture with anterior wedge deformity. Associated mild lower thoracic kyphosis.   2.  T2 old superior endplate compression fracture.   3.  Multilevel disc bulges T3-T4, T7-T8, T8-T9, T11-12, T12-L1. Variable mass effect on the ventral thecal sac without quinton central stenosis or cord impingement at any thoracic level.   4.  No myelopathic cord signal at any thoracic level.      DX-CHEST-PORTABLE (1 VIEW)   Final Result      No acute cardiopulmonary disease evident.           Assessment/Plan  * Compression fracture of body of thoracic vertebra (HCC)- (present on admission)  Assessment & Plan  MRI T-spine with T12 old compression fracture, T2 old superior endplate compression fracture, multiple level disc bulges T3-4 T7-T8 T8-T9, T11-T12, variable mass effect with out  central stenosis or cord impingement, MRI L-spine with T12 compression fracture, previous L4-L5 laminectomy, multilevel degenerative changes.  - Neurosurgery has been consulted  - Recommending non operative management at this time  - Continue to follow-up outpatient for possible epidural  - Continue gabapentin, lidocaine and oxycodone as needed for pain management  - PT OT recommending postacute rehabilitation  - I personally  reviewed MRI images with patient at bedside 1/16 1/21 placement pending, f/u with CM  - ongoing LBP, schedule flexeril    1/22 PT reeval with AFO now available  Pain control, encourage activity, placement pending    1/23 PT  with AFO, encourage activity, pending placement, CM to assist, ? GH    Moderate episode of recurrent major depressive disorder (HCC)  Assessment & Plan  Patient endorsing depression.  Unwillingness to participate in his rehabilitation.  Endorses feelings of helplessness and hopelessness.  Per nursing staff he has expressed SI without a plan.  He does not endorse SI to me; on 2 separate occasions.  - Continue to encourage patient to participate in his rehabilitation.  - Increase venlafaxine to 75 mg  - Inpatient behavioral health referral has been placed.  - I do not see an indication to initiate a legal hold at this point.  Patient denies SI and SI plan to me.  Per nursing staff patient does become agitated when his snacks are withheld.  We will continue to monitor the situation closely.    Bacteriuria- (present on admission)  Assessment & Plan  No abx's for now given lack of symptoms    Physical debility- (present on admission)  Assessment & Plan  MRI with compression T2 and T12 fracture, multilevel bulging disc through thoracic spine.  No cord impingement.  Neurosurgery recommended nonoperative management  Follow-up for epidural injection as outpatient  Placement is pending    1/21 Patient reports ambulatory status prior to this admission, to follow-up with orthopedic surgery for AFO evaluation  , Will reconsult PT OT for AFO as needed, will consult orthopedic surgery as needed  -Patient reports low back pain, has Zanaflex as needed, will add Flexeril 3 times daily  Continue pain control  Encourage activity    Acute hypoxic respiratory failure (HCC)- (present on admission)  Assessment & Plan  Resolved  Now on room air, continue to monitor oxygen requirement    Elevated troponin- (present on  admission)  Assessment & Plan  Chronically elevated. Baseline  - no further workup    Uncontrolled type 2 diabetes mellitus with hyperglycemia (HCC)- (present on admission)  Assessment & Plan  A1C 9.8  - Continue Lantus to 25 units in the evening  - Continue sliding scale insulin; has required 2 units SSI in last 24 hours  - Patient states he is unable to drop insulin at home due to hand coordination issues.  We will try to minimize sliding scale insulin and discharged with only Lantus pens.  - Monitor BG trend.   - Accu-Cheks before meals and at bedtime.   - Goal to keep BG between 140-180 per 2019 ADA guidelines     1/21 hypoglycemia this am, decrease Lantus to 18 units, cont ssi  - f/u am labs    1/22 bs improved, on lantus 18 units, cont and monitor  ? GH    CKD (chronic kidney disease)- (present on admission)  Assessment & Plan  Creatinine stable at 0.57       VTE prophylaxis:   SCDs/TEDs      I have performed a physical exam and reviewed and updated ROS and Plan today (1/23/2025). In review of yesterday's note (1/22/2025), there are no changes except as documented above.

## 2025-01-23 NOTE — DISCHARGE PLANNING
Case Management Discharge Planning    Admission Date: 1/9/2025  GMLOS: 3.5  ALOS: 13    6-Clicks ADL Score: 18  6-Clicks Mobility Score: 16      Anticipated Discharge Dispo: Discharge Disposition: Discharged to home/self care (01)    This RN CM completed chart review. This RN CM confirmed with Dr that patient will not be able to tolerate oral medication and will need injection. This RN CM reached out to rocio with diabetes education for what options can be considered thru pen or injection pump.     This RN CM to follow up with sister Delia to see what family members can help with for care until patient house can be sold.     Plan to complete application for aging and disability prior to discharge.     This RN CM called sister for update on family conversation for care of patient on discharge. No answer, voicemail left.     This RN CM called son Nickolas and left a voicemail for follow up.    This RN CM followed up with sister Delia and she has confirmed with son and brother they are unable to care for patient. This RN CM updated that PT stated that patient can self transfer from the wheelchair. Sister updated that she is still concerned about taking patient home if he fell she would not be able to get him off the ground.     This RN CM discussed GH option and updated they cost about $4,000/month. Patient collects $2400 and asked if family is able to contribute any income. Sister updated that she would have to talk to the family but that they could also work on selling patient's car to help with a few months rent until his house can be sold.     This RN CM to follow up with patient about selling his car to use towards GH placement. And also discuss POA paperwork for patient to be filed before he goes to a GH.

## 2025-01-23 NOTE — CARE PLAN
The patient is Stable - Low risk of patient condition declining or worsening    Shift Goals  Clinical Goals: Monitor BG and keep pain below a 8 the entire shift  Patient Goals: Pain management  Family Goals: KATHERINE    Progress made toward(s) clinical / shift goals:  Monitored blood glucose, reinforced diet. Pt remained free of falls. AFO boot at bedside. Call light in reach. Pt's needs are attended.     Problem: Safety  Goal: Will remain free from falls  Outcome: Progressing     Problem: Pain Management  Goal: Pain level will decrease to patient's comfort goal  Outcome: Progressing     Problem: Infection  Goal: Will remain free from infection  Outcome: Progressing     Problem: Mobility  Goal: Patient's capacity to carry out activities will improve  Outcome: Progressing       Patient is not progressing towards the following goals:

## 2025-01-23 NOTE — CARE PLAN
The patient is Stable - Low risk of patient condition declining or worsening    Shift Goals  Clinical Goals: monitoring blood sugar, encourage up for meals,  Patient Goals: pain management  Family Goals: vicente    Progress made toward(s) clinical / shift goals:  monitoring pt. Blood sugar, reinforced diet. Free from fall. AFO boot now at bedside. Call light in reach. Needs attended.      Problem: Safety  Goal: Will remain free from injury  Outcome: Progressing     Problem: Pain Management  Goal: Pain level will decrease to patient's comfort goal  Outcome: Progressing     Problem: Mobility  Goal: Patient's capacity to carry out activities will improve  Outcome: Progressing     Problem: Skin Integrity  Goal: Risk for impaired skin integrity will decrease  Outcome: Progressing       Patient is not progressing towards the following goals:

## 2025-01-23 NOTE — CONSULTS
"[facility]    INPATIENT ASSESSMENT    Name: Christoph Taylor  MRN: [id number]  : 1965  Age: 59 y.o.  Date of assessment: 2025  PCP: YESY Tariq.  Persons in attendance: Patient    HPI: Per Medical Record: \"Christoph Taylor is a 59 y.o. male with history of chronic upper abdominal pain uncontrolled diabetes with DKA's, CKD 3, kidney stones, horseshoe kidney, esophagitis, who presented 2024 with ongoing upper abdominal pain on and off associated with nausea and occasional vomiting.  He presented with the same complaints yesterday and was evaluated to be CT of the abdomen with contrast that showed possible esophagitis, distended urinary bladder and 5 mm nonobstructive kidney stone.  He reported suicidal ideations yesterday, but denies any ideations or plans today  During observation in ER he transiently desaturated to 83% and was placed on 2 L nasal cannula.  Blood work showed WBC 14.2.  COVID-19/influenza negative.  Chest x-ray: Negative.  CT of pulmonary artery was ordered  Blood sugar is in the 400s.  Patient stated he skipped 3 days of Lantus.  Bladder scan revealed 1 L of urine and I ordered Crowley catheter  Treatment in the ER included ceftriaxone and azithromycin for possible early pneumonia, 1 L of Ringer lactate and Tylenol 1 g.  Of note, patient denies cough or shortness of breath.\"  Behavioral Health continues to follow to provide support and psychotherapy.         CHIEF COMPLAINT/PRESENTING ISSUE (as stated by Patient): Christoph Taylor is a 59 year old male seen lying on hospital bed, alert, oriented, speech clear and coherent, no sign of a thought disorder. Patient denies suicidal or homicidal ideations. Patient endorses ongoing depression.  Clinician used solution focused therapy techniques with patient to encourage him to begin setting goals, to help him have something to work towards. Patient is consumed with negative perceptions that interfere with his " "motivation for functional improvement.  Patient was resistant initially stating, \"I can't do anything, without having a home or place to go to.\" Clinician assisted patient in focusing on the current plan which was to begin living from a future focused perspective by creating goals. Patient was able to say, \"I want to walk\". Reportedly, patient had been declining physical therapy. Patient states, \"I have my  now, I want to have physical therapy and start practicing walking\". Patient committed to compliance with physical therapy. Clinician encouraged patient to set another goal. Patient reports, \"being more independent\". Clinician validated patients effort to engage in the process of setting goals and will continue to work with him on reframing his negative thinking patterns and increasing his motivation to improve his functioning.    Chief Complaint   Patient presents with    Weakness     Increasing weakness last week, decreased dexterity with upper extremities baseline, thoracic back pain and worsening last x4 days, prior back surgeries, Rx Gabapentin    High Blood Sugar     With , has not been able to give self insulin last x4 days, at ER POC glucose 382       CURRENT LIVING SITUATION/SOCIAL SUPPORT: Patient is retired and lives alone. He has three sons who reside in [place]. Patient reports no outside interests and no support system.     BEHAVIORAL HEALTH TREATMENT HISTORY  Does patient/parent report a history of prior behavioral health treatment for patient?   Previous hospital evaluations-no report of outpatient treatment     SAFETY ASSESSMENT - SELF  Does patient acknowledge current or past symptoms of dangerousness to self? no  Does parent/significant other report patient has current or past symptoms of dangerousness to self? N/A  Does presenting problem suggest symptoms of dangerousness to self? No        SAFETY ASSESSMENT - OTHERS  Does patient acknowledge current or past symptoms of aggressive " "behavior or risk to others? no  Does parent/significant other report patient has current or past symptoms of aggressive behavior or risk to others?  N\A  Does presenting problem suggest symptoms of dangerousness to others? No        Crisis Safety Plan completed and copy given to patient? Verbal safety plan     ABUSE/NEGLECT SCREENING     Does patient report feeling “unsafe” in his/her home, or afraid of anyone?  no  Does patient report any history of physical, sexual, or emotional abuse?  no  Does parent or significant other report any of the above? N\A  Is there evidence of neglect by self?  no  Is there evidence of neglect by a caregiver? no  Does the patient/parent report any history of CPS/APS/police involvement related to suspected abuse/neglect or domestic violence? no  Based on the information provided during the current assessment, is a mandated report of suspected abuse/neglect being made?  No     SUBSTANCE USE SCREENING  Yes:  Merlin all substances used in the past 30 days:         Last Use Amount   []   Alcohol       []   Marijuana       []   Heroin       []   Prescription Opioids  (used without prescription, for    recreation, or in excess of prescribed amount)       []   Other Prescription  (used without prescription, for    recreation, or in excess of prescribed amount)       []   Cocaine       []   Methamphetamine       []   \"\" drugs (ectasy, MDMA)       []   Other substances                     UDS results: not assessed  Diagnostic alcohol results: not assessed     What consequences does the patient associate with any of the above substance use and or addictive behaviors? Health problems:      Risk factors for detox (check all that apply):  []   Seizures   []   Diaphoretic (sweating)   []   Tremors   []   Hallucinations   []   Increased blood pressure   []   Decreased blood pressure   []   Other   []   None       [] Patient education on risk factors for detoxification and instructed to return " to ER as needed.     MENTAL STATUS              Participation: adequate  Grooming: Casual  Orientation: Alert  Behavior: Tense  Eye contact: Limited  Mood: Depressed  Affect: Flat  Thought process: Circumstantial  Thought content: Within normal limits  Speech: Soft  Perception: Within normal limits  Memory:  Recent:  Adequate  Insight: Adequate  Judgment:  Adequate  Other:     Collateral information:   Source:   Significant other present in person:    Significant other by telephone  [facility] Medical Record-reviewed   Other: Attending , physical and occupational therapy via mariluze      Unable to complete full assessment due to:   Acute intoxication   Patient declined to participate/engage   Patient verbally unresponsive   Significant cognitive deficits   Significant perceptual distortions or behavioral disorganization   Other:              CLINICAL IMPRESSIONS:  Primary:  Major Depressive Disorder  Secondary:  Depression due to other medical condition                                        IDENTIFIED NEEDS/PLAN:  [Trigger DISPOSITION list for any items marked]       Imminent safety risk - self   Imminent safety risk - others     Acute substance withdrawal   Psychosis/Impaired reality testing    x Mood/anxiety   Substance use/Addictive behavior     Maladaptive behavior   Parent/child conflict     Family/Couples conflict   Biomedical     Housing   Financial      Legal   Occupational/Educational     Domestic violence   Other:      Recommendations and Observation Level:     Consulted with physician to re-order physical therapy      Legal Hold: N/A               Thank you,       Farzana Tripathi, Ph.D., [facility]  1/23/2025   Length of intervention: 45 minutes

## 2025-01-24 LAB
GLUCOSE BLD STRIP.AUTO-MCNC: 126 MG/DL (ref 65–99)
GLUCOSE BLD STRIP.AUTO-MCNC: 137 MG/DL (ref 65–99)
GLUCOSE BLD STRIP.AUTO-MCNC: 167 MG/DL (ref 65–99)
GLUCOSE BLD STRIP.AUTO-MCNC: 174 MG/DL (ref 65–99)

## 2025-01-24 PROCEDURE — 700102 HCHG RX REV CODE 250 W/ 637 OVERRIDE(OP): Performed by: INTERNAL MEDICINE

## 2025-01-24 PROCEDURE — A9270 NON-COVERED ITEM OR SERVICE: HCPCS | Performed by: NURSE PRACTITIONER

## 2025-01-24 PROCEDURE — A9270 NON-COVERED ITEM OR SERVICE: HCPCS | Performed by: STUDENT IN AN ORGANIZED HEALTH CARE EDUCATION/TRAINING PROGRAM

## 2025-01-24 PROCEDURE — 700101 HCHG RX REV CODE 250: Performed by: NURSE PRACTITIONER

## 2025-01-24 PROCEDURE — 700102 HCHG RX REV CODE 250 W/ 637 OVERRIDE(OP): Performed by: NURSE PRACTITIONER

## 2025-01-24 PROCEDURE — A9270 NON-COVERED ITEM OR SERVICE: HCPCS | Performed by: INTERNAL MEDICINE

## 2025-01-24 PROCEDURE — 82962 GLUCOSE BLOOD TEST: CPT | Mod: 91

## 2025-01-24 PROCEDURE — 700102 HCHG RX REV CODE 250 W/ 637 OVERRIDE(OP)

## 2025-01-24 PROCEDURE — 99232 SBSQ HOSP IP/OBS MODERATE 35: CPT | Performed by: INTERNAL MEDICINE

## 2025-01-24 PROCEDURE — 770006 HCHG ROOM/CARE - MED/SURG/GYN SEMI*

## 2025-01-24 PROCEDURE — 97164 PT RE-EVAL EST PLAN CARE: CPT

## 2025-01-24 PROCEDURE — A9270 NON-COVERED ITEM OR SERVICE: HCPCS

## 2025-01-24 PROCEDURE — 700102 HCHG RX REV CODE 250 W/ 637 OVERRIDE(OP): Performed by: STUDENT IN AN ORGANIZED HEALTH CARE EDUCATION/TRAINING PROGRAM

## 2025-01-24 PROCEDURE — 700111 HCHG RX REV CODE 636 W/ 250 OVERRIDE (IP): Mod: JZ | Performed by: INTERNAL MEDICINE

## 2025-01-24 PROCEDURE — 97535 SELF CARE MNGMENT TRAINING: CPT

## 2025-01-24 RX ADMIN — CYCLOBENZAPRINE 10 MG: 10 TABLET, FILM COATED ORAL at 06:25

## 2025-01-24 RX ADMIN — OXYCODONE 5 MG: 5 TABLET ORAL at 18:00

## 2025-01-24 RX ADMIN — LIDOCAINE 3 PATCH: 4 PATCH TOPICAL at 17:56

## 2025-01-24 RX ADMIN — VENLAFAXINE HYDROCHLORIDE 75 MG: 75 TABLET ORAL at 06:24

## 2025-01-24 RX ADMIN — CYCLOBENZAPRINE 10 MG: 10 TABLET, FILM COATED ORAL at 17:56

## 2025-01-24 RX ADMIN — OMEPRAZOLE 40 MG: 20 CAPSULE, DELAYED RELEASE ORAL at 06:25

## 2025-01-24 RX ADMIN — OXYCODONE 5 MG: 5 TABLET ORAL at 21:23

## 2025-01-24 RX ADMIN — OXYCODONE 5 MG: 5 TABLET ORAL at 06:26

## 2025-01-24 RX ADMIN — ENOXAPARIN SODIUM 40 MG: 100 INJECTION SUBCUTANEOUS at 18:00

## 2025-01-24 RX ADMIN — METFORMIN HYDROCHLORIDE 500 MG: 500 TABLET ORAL at 08:23

## 2025-01-24 RX ADMIN — GABAPENTIN 200 MG: 100 CAPSULE ORAL at 17:56

## 2025-01-24 RX ADMIN — CYCLOBENZAPRINE 10 MG: 10 TABLET, FILM COATED ORAL at 12:03

## 2025-01-24 RX ADMIN — INSULIN GLARGINE-YFGN 20 UNITS: 100 INJECTION, SOLUTION SUBCUTANEOUS at 17:55

## 2025-01-24 RX ADMIN — OMEPRAZOLE 40 MG: 20 CAPSULE, DELAYED RELEASE ORAL at 17:56

## 2025-01-24 RX ADMIN — INSULIN LISPRO 1 UNITS: 100 INJECTION, SOLUTION INTRAVENOUS; SUBCUTANEOUS at 12:04

## 2025-01-24 RX ADMIN — GABAPENTIN 200 MG: 100 CAPSULE ORAL at 12:03

## 2025-01-24 RX ADMIN — INSULIN LISPRO 1 UNITS: 100 INJECTION, SOLUTION INTRAVENOUS; SUBCUTANEOUS at 21:24

## 2025-01-24 RX ADMIN — METFORMIN HYDROCHLORIDE 500 MG: 500 TABLET ORAL at 17:56

## 2025-01-24 RX ADMIN — GABAPENTIN 200 MG: 100 CAPSULE ORAL at 06:25

## 2025-01-24 ASSESSMENT — COGNITIVE AND FUNCTIONAL STATUS - GENERAL
STANDING UP FROM CHAIR USING ARMS: A LITTLE
MOVING TO AND FROM BED TO CHAIR: A LITTLE
SUGGESTED CMS G CODE MODIFIER MOBILITY: CJ
MOBILITY SCORE: 20
WALKING IN HOSPITAL ROOM: A LITTLE
CLIMB 3 TO 5 STEPS WITH RAILING: A LITTLE

## 2025-01-24 ASSESSMENT — ENCOUNTER SYMPTOMS
PALPITATIONS: 0
WEAKNESS: 1
FEVER: 0
HEADACHES: 0
SHORTNESS OF BREATH: 0
HEARTBURN: 0
BACK PAIN: 1
DIZZINESS: 0
NERVOUS/ANXIOUS: 0
MYALGIAS: 1
NAUSEA: 0
NECK PAIN: 0
ABDOMINAL PAIN: 0

## 2025-01-24 ASSESSMENT — PAIN DESCRIPTION - PAIN TYPE
TYPE: ACUTE PAIN

## 2025-01-24 ASSESSMENT — GAIT ASSESSMENTS
DEVIATION: ATAXIC;BRADYKINETIC
ASSISTIVE DEVICE: FRONT WHEEL WALKER
DISTANCE (FEET): 20
GAIT LEVEL OF ASSIST: CONTACT GUARD ASSIST

## 2025-01-24 NOTE — PROGRESS NOTES
Hospital Medicine Daily Progress Note    Date of Service  1/24/2025    Chief Complaint  Christoph Taylor is a 59 y.o. male admitted 1/9/2025 with weakness.    Hospital Course  Christoph Taylor is a 59-year-old male with prior suicidal ideation, poorly controlled diabetes mellitus type 2, housing insecurity and severe protein calorie malnutrition.  Admitted 1/9 for acute on chronic back pain.    Patient states that he is unable to take care of himself and has a hard time administering insulin, buying food for himself, taking his blood sugars. Additionally says that he has worsening neuropathy of the feet and hands. His pain is worse than ever. He has no additional help here.    In the ER he was found to be hyperglycemic and quite weak.  UA was concerning for possible UTI.  Patient was given 1 dose of IV cefazolin.  However, he denied urinary symptoms and antibiotics were not continued.     In terms of patient's weakness-MRI thoracic and lumbar notable for old compression fracture at T12 and multiple disc bulging without quinton central canal stenosis or cord impingement.  Neurosurgery was consulted.  Dr. Johnson recommending non operative management and outpatient follow-up for possible epidural injection.  PT OT recommending postacute rehabilitation.  However, no accepting facilities were identified.  Patient intermittently refusing to work with PT here.    Home health has been arranged prior to discharge.    Interval Problem Update  1/14: Vitals stable overnight.  -129.  Patient is medically clear for discharge pending placement.    1/15: Vitals remained stable.   through 156.  Glucose ranging 104 through 246.  Case management is working hard to obtain patient's insurance card to evaluate benefits for postacute rehabilitation.  Patient is medically cleared to discharge once insurance barrier has been resolved.    1/16: Vitals stable.  Glucose this a.m. 176.  Range 2 29 through 3 05.  Increase Lantus to  25 units in the p.m.  Multiple long discussions with patient, case management and nursing staff regarding discharge plan.  Patient feels unsafe to discharge to his home.  He has 5 steps.  He does not have a ramp.  He is largely wheelchair-bound due to chronic pain.    1/17: Vital stable overnight.  -130.  Glucose improved with increase Lantus dosing.  A.m. blood sugar 104.  Patient is now being followed by complex discharge committee.    1/18: Vitals notable for SBP .  Glucose ranging 125 through 196.  Patient required 1 unit sliding scale insulin yesterday.  Patient remains medically clear for discharge though a safe discharge plan has not been arranged yet.  Encouraged ambulation at least 3 times daily.    1/19: Vital stable overnight.   through 126.  Glucose range .  Required 2 units SSI over last 24 hours.    1/20: Vitals stable overnight.  Patient endorsing depression and refusal to participate in medical therapies.  I have placed a referral for inpatient psychology/behavioral health.  Increase venlafaxine to 75 mg.  Continue to encourage participation in ambulation and patient rehabilitation.    1/21 Patient able to self transfer to chair, reports ongoing low back pain, pain level of 7/10  States that he is now wheelchair-bound, pending placement,  to assist  Patient with hypoglycemic episode this a.m. was given juice, unable to self administer insulin at home, will need placement assistance    1/22 no new events, sleeping arousable, AFO delivered for PT reeval    1/23 reports low back pain, able to transfer to chair, PT blanca    1/24 ongoing lbp, min activity, Has not worked with PT with AFO, to reconsult  Improving blood sugar    I have discussed this patient's plan of care and discharge plan at IDT rounds today with Case Management, Nursing, Nursing leadership, and other members of the IDT team.    Consultants/Specialty  None at this time     Code Status  Full  Code    Disposition  The patient is not medically cleared for discharge to home or a post-acute facility.      I have placed the appropriate orders for post-discharge needs.    Review of Systems  Review of Systems   Constitutional:  Negative for fever and malaise/fatigue.   Respiratory:  Negative for shortness of breath.    Cardiovascular:  Negative for palpitations and leg swelling.   Gastrointestinal:  Negative for abdominal pain, heartburn and nausea.   Musculoskeletal:  Positive for back pain and myalgias. Negative for neck pain.   Neurological:  Positive for weakness. Negative for dizziness and headaches.   Psychiatric/Behavioral:  The patient is not nervous/anxious.         Physical Exam  Temp:  [35.9 °C (96.7 °F)-36.7 °C (98 °F)] 35.9 °C (96.7 °F)  Pulse:  [65-80] 80  Resp:  [16-18] 17  BP: (125-144)/(72-86) 129/72  SpO2:  [93 %-97 %] 97 %    Physical Exam  Vitals and nursing note reviewed.   Constitutional:       Appearance: Normal appearance. He is ill-appearing. He is not diaphoretic.   HENT:      Mouth/Throat:      Mouth: Mucous membranes are moist.   Eyes:      Extraocular Movements: Extraocular movements intact.      Pupils: Pupils are equal, round, and reactive to light.   Cardiovascular:      Rate and Rhythm: Normal rate and regular rhythm.   Pulmonary:      Effort: Pulmonary effort is normal. No respiratory distress.   Abdominal:      General: There is no distension.      Palpations: There is no mass.   Skin:     General: Skin is warm.      Coloration: Skin is not pale.      Findings: No erythema.   Neurological:      Mental Status: He is alert and oriented to person, place, and time. Mental status is at baseline.      Cranial Nerves: No cranial nerve deficit.      Sensory: No sensory deficit.      Motor: Weakness present.      Coordination: Coordination normal.   Psychiatric:         Mood and Affect: Mood normal.         Behavior: Behavior normal.         Fluids    Intake/Output Summary (Last 24  hours) at 1/24/2025 1249  Last data filed at 1/24/2025 0900  Gross per 24 hour   Intake 260 ml   Output 1300 ml   Net -1040 ml        Laboratory      Recent Labs     01/22/25  0355   SODIUM 142   POTASSIUM 3.7   CHLORIDE 102   CO2 32   GLUCOSE 69   BUN 22   CREATININE 0.61   CALCIUM 8.7                   Imaging  MR-LUMBAR SPINE-W/O   Final Result      1.  T12 chronic compression fracture with anterior wedge deformity.   2.  Postoperative changes with interval L4-5 laminectomy since prior MRI study. There is now laminectomy at L4-5-S1 and there is now posterior fusion with transpedicular screw fixation at L4-L5. Relief of previously seen severe central stenosis at L4-5.   3.  Multilevel degenerative changes as detailed for each level above in the body of report.      MR-THORACIC SPINE-W/O   Final Result      1.  T12 old compression fracture with anterior wedge deformity. Associated mild lower thoracic kyphosis.   2.  T2 old superior endplate compression fracture.   3.  Multilevel disc bulges T3-T4, T7-T8, T8-T9, T11-12, T12-L1. Variable mass effect on the ventral thecal sac without quinton central stenosis or cord impingement at any thoracic level.   4.  No myelopathic cord signal at any thoracic level.      DX-CHEST-PORTABLE (1 VIEW)   Final Result      No acute cardiopulmonary disease evident.           Assessment/Plan  * Compression fracture of body of thoracic vertebra (HCC)- (present on admission)  Assessment & Plan  MRI T-spine with T12 old compression fracture, T2 old superior endplate compression fracture, multiple level disc bulges T3-4 T7-T8 T8-T9, T11-T12, variable mass effect with out  central stenosis or cord impingement, MRI L-spine with T12 compression fracture, previous L4-L5 laminectomy, multilevel degenerative changes.  - Neurosurgery has been consulted  - Recommending non operative management at this time  - Continue to follow-up outpatient for possible epidural  - Continue gabapentin, lidocaine and  oxycodone as needed for pain management  - PT OT recommending postacute rehabilitation  - I personally reviewed MRI images with patient at bedside 1/16 1/21 placement pending, f/u with CM  - ongoing LBP, schedule flexeril    1/22 PT reeval with AFO now available  Pain control, encourage activity, placement pending    1/23 PT  with AFO, encourage activity, pending placement, CM to assist, ? GH  1/24 PT to work with patient with AFO, consult replaced  Encourage activity    Moderate episode of recurrent major depressive disorder (HCC)  Assessment & Plan  Patient endorsing depression.  Unwillingness to participate in his rehabilitation.  Endorses feelings of helplessness and hopelessness.  Per nursing staff he has expressed SI without a plan.  He does not endorse SI to me; on 2 separate occasions.  - Continue to encourage patient to participate in his rehabilitation.  - Increase venlafaxine to 75 mg  - Inpatient behavioral health referral has been placed.  - I do not see an indication to initiate a legal hold at this point.  Patient denies SI and SI plan to me.  Per nursing staff patient does become agitated when his snacks are withheld.  We will continue to monitor the situation closely.    Bacteriuria- (present on admission)  Assessment & Plan  No abx's for now given lack of symptoms    Physical debility- (present on admission)  Assessment & Plan  MRI with compression T2 and T12 fracture, multilevel bulging disc through thoracic spine.  No cord impingement.  Neurosurgery recommended nonoperative management  Follow-up for epidural injection as outpatient  Placement is pending    1/21 Patient reports ambulatory status prior to this admission, to follow-up with orthopedic surgery for AFO evaluation  , Will reconsult PT OT for AFO as needed, will consult orthopedic surgery as needed  -Patient reports low back pain, has Zanaflex as needed, will add Flexeril 3 times daily  Continue pain control  Encourage  activity    Acute hypoxic respiratory failure (HCC)- (present on admission)  Assessment & Plan  Resolved  Now on room air, continue to monitor oxygen requirement    Elevated troponin- (present on admission)  Assessment & Plan  Chronically elevated. Baseline  - no further workup    Uncontrolled type 2 diabetes mellitus with hyperglycemia (HCC)- (present on admission)  Assessment & Plan  A1C 9.8  - Continue Lantus to 25 units in the evening  - Continue sliding scale insulin; has required 2 units SSI in last 24 hours  - Patient states he is unable to drop insulin at home due to hand coordination issues.  We will try to minimize sliding scale insulin and discharged with only Lantus pens.  - Monitor BG trend.   - Accu-Cheks before meals and at bedtime.   - Goal to keep BG between 140-180 per 2019 ADA guidelines     1/21 hypoglycemia this am, decrease Lantus to 18 units, cont ssi  - f/u am labs    1/22 bs improved, on lantus 18 units, cont and monitor  ? GH    1/23 improvng bs, increase Lantus 20 u, add metformin, monitor    CKD (chronic kidney disease)- (present on admission)  Assessment & Plan  Creatinine stable at 0.57       VTE prophylaxis:   SCDs/TEDs      I have performed a physical exam and reviewed and updated ROS and Plan today (1/24/2025). In review of yesterday's note (1/23/2025), there are no changes except as documented above.

## 2025-01-24 NOTE — CARE PLAN
The patient is Stable - Low risk of patient condition declining or worsening    Shift Goals  Clinical Goals: Keep pt's pain less then 5 for entire shift  Patient Goals: rest, comfort  Family Goals: KATHERINE    Progress made toward(s) clinical / shift goals:  Pt received pain medications at the beginning of shift. Administered medications in accordance to the MAR. Bed in lowest position, brake on, call light within reach. Al needs met at this time.     Problem: Safety  Goal: Will remain free from injury  Outcome: Progressing     Problem: Safety  Goal: Will remain free from falls  Outcome: Progressing       Patient is not progressing towards the following goals:

## 2025-01-24 NOTE — DIETARY
Nutrition Services: Follow-up for PO Intake   Day 14 of admit. Christoph Taylor is a 59 y.o. male with admitting DX of Physical debility [R53.81]  Acute respiratory failure with hypoxia (HCC) [J96.01]    Pt remains ordered a consistent carbohydrate diet. PO intake averages >75% x 9 meals. Last BM 1/24/25.     Pertinent Medications: lantus, Humalog, metformin, omeprazole     Current diet order:   Consistent CHO diet     Food and Nutrition-Related Knowledge Deficit related to lack or prior education as evidenced by need for further education.     Nutrition Dx Status: Ongoing     Problem: Nutritional:  Goal: Achieve adequate nutritional intake  Description: Patient will consume >75% of meals  Outcome: Met      Plan/ Recommendations:      Encourage intake of meals, goal for >75% consumption from meals and/or supplements  Document intake of all meals as % taken in ADL's to provide interdisciplinary communication across all shifts.   Monitor weight.  Nutrition rep will continue to see patient for ongoing meal and snack preferences.       RD following

## 2025-01-24 NOTE — DISCHARGE PLANNING
Case Management Discharge Planning    Admission Date: 1/9/2025  GMLOS: 3.5  ALOS: 14    6-Clicks ADL Score: 18  6-Clicks Mobility Score: 16      Anticipated Discharge Dispo: Discharge Disposition: Discharged to home/self care (01)    This RN CM completed chart review patient is pending medical clearance and coordination for funds for GH placement as family is unable to care for patient.     This RN CM spoke with patient at bedside and discussed with him that he would need to be able to self administer insulin with pen before he can go to GH. Also discussed with patient that his sister would like to assist with funds for GH by selling his vehicle. Patient verbalized approval of selling his car to assist with funds to pay for GH until his home can be sold.      This RN CM also discussed with patent consideration of POA paperwork and arranging for mobile notary to that if he is unable to make his own decisions.. patient was open to completing paperwork for POA.      This RN CM spoke with sister Delia and she will coordinate a call with akilah and the patient tomorrow to confirm okay to move forward with selling car and mobile home. Family will work on registering the vehicle and selling it for additional funds to pay for GH. Family would like Christoph moved to a group home in [place] between [place] and [place].

## 2025-01-24 NOTE — THERAPY
Physical Therapy   Re Evaluation     Patient Name: Christoph Taylor  Age:  59 y.o., Sex:  male  Medical Record #: [id number]  Today's Date: 1/24/2025     Precautions  Precautions: (P) Fall Risk  Comments: (P) L AFO, decreased vision    Assessment    59 y.o. male was referred back to PT after receiving his new AFO.  He presents with decreased sensation and decreased vision impairing his coordination -> required assist to don/ doff AFO and L shoe.  He was able to ambulate 20' with FWW and ascend/ descend 5 steps with B rails with CGA d/t impaired balance and ataxia.  His decreased vision also presents a barrier to ascend/ descend his home steps safely as pt reports no differentiation on his home steps to indicate the edge of the step.  He also has no one to take his w/c up and down the steps at home and he required BUEs to assist stair negotiation.  Pt is at his functional baseline with no PT needs indicated.  Recommend 24 hour supervision/supportive environment.  Pt educated on PT recommendation and rationale.     Plan    Physical Therapy Initial Treatment Plan   Treatment Plan : Bed Mobility, Equipment, Gait Training, Neuro Re-Education / Balance, Self Care / Home Evaluation, Therapeutic Activities, Therapeutic Exercise  Treatment Frequency: 2 Times per Week  Duration: (P) Discharge Needs Only    DC Equipment Recommendations: (P) None (Pt has a walker and w/c)  Discharge Recommendations: (P)  (Recommend 24 hour supervision/ supportive environment d/t pt's inabilty to care for himself.  Pt is at functional baseline.)       Subjective    Pt expresses frustration at not being able to do things for himself and not having the family support to help him.     Objective       01/24/25 1422   Initial Contact Note    Initial Contact Note Order Received and Verified, Physical Therapy Evaluation in Progress with Full Report to Follow.   Precautions   Precautions Fall Risk   Comments L AFO, decreased vision   Vitals   O2  Delivery Device None - Room Air   Pain 0 - 10 Group   Therapist Pain Assessment Post Activity Pain Same as Prior to Activity  (no complaints of pain)   Prior Living Situation   Housing / Facility 1 Story House   Steps Into Home 5   Rail Both Rail (Steps into Home)   Bathroom Set up Walk In Shower   Equipment Owned Front-Wheel Walker;Wheelchair   Lives with - Patient's Self Care Capacity Alone and Unable to Care For Self   Prior Level of Functional Mobility   Bed Mobility Independent   Transfer Status Independent   Ambulation Required Assist   Ambulation Distance limited household   Assistive Devices Used Wheelchair   Wheelchair Independent   Stairs Dependent   Comments Reports using a wheelchair only, crawls at times if wheelchair can't get him somewhere. Dependently carried up/down the stairs by EMS per pt.   Cognition    Cognition / Consciousness WDL   Level of Consciousness Alert   Active ROM Upper Body   Dominant Hand Right   Comments chronic hand/digit limitations- maintains fingers flexed  at middle and distal IPs, able to open fingers enought tograb straps on AFO and shoes   Strength Upper Body   Comments  strength 3+/5 B   Sensation Upper Body   Upper Extremity Sensation  X   Comments impaired B hands and fingers   Active ROM Lower Body    Active ROM Lower Body  WDL   Strength Lower Body   Comments BLEs grossly 4/5   Sensation Lower Body   Comments neuropathy BLEs, no change from baseline   Neurological Concerns   Comments Pt has a collapsed L foot arch   Coordination Upper Body   Coordination X   Fine Motor Coordination impaired BUEs   Coordination Lower Body    Coordination Lower Body  X   Comments ataxic gait with impaired foot placement   Vision   Vision Comments Pt reports decreased vision, difficulty differentiating brace from straps from shoes d/t them all being dark.   Other Treatments   Other Treatments Provided Keysha to don AFO and L shoe, start to undo straps to doff AFO   Balance Assessment    Sitting Balance (Static) Fair   Sitting Balance (Dynamic) Fair   Standing Balance (Static) Fair -   Standing Balance (Dynamic) Fair -   Weight Shift Sitting Fair   Weight Shift Standing Fair   Bed Mobility    Supine to Sit Modified Independent   Sit to Supine Modified Independent   Scooting Modified Independent   Rolling Modified Independent   Comments HOB partially elevated, rail   Gait Analysis   Gait Level Of Assist Contact Guard Assist   Assistive Device Front Wheel Walker   Distance (Feet) 20   # of Times Distance was Traveled 1   Deviation Ataxic;Bradykinetic   # of Stairs Climbed 5  (step to, B rails with pt moderately using his arms to pull himself up to ascend steps)   Level of Assist with Stairs Contact Guard Assist   Weight Bearing Status WBAT BLEs with L AFO   Functional Mobility   Sit to Stand Standby Assist   Bed, Chair, Wheelchair Transfer Contact Guard Assist   Transfer Method Stand Step   Mobility with FWW   Edema / Skin Assessment   Edema / Skin  Not Assessed   Education Group   Additional Comments Pt educated about PT recommendation for 24 hour supervision, progressive nature of DM with further decline in sensation and vision, need for assist to don/ doff his AFO and shoes, concern for pt not being able to leave his home negotiating the stairs safely and being able to get the w/c up/ down the steps, bigger limitation of completing ADLs safely living alone.  Pt acknowledges the information stating he understands.   Physical Therapy Initial Treatment Plan    Duration Discharge Needs Only   Anticipated Discharge Equipment and Recommendations   DC Equipment Recommendations None  (Pt has a walker and w/c)   Discharge Recommendations   (Recommend 24 hour supervision/ supportive environment d/t pt's inabilty to care for himself.  Pt is at functional baseline.)   Interdisciplinary Plan of Care Collaboration   IDT Collaboration with  Nursing   Patient Position at End of Therapy In Bed;Bed Alarm On;Call  Light within Reach;Tray Table within Reach   Collaboration Comments RN gloriaatd, PT recommendations   Session Information   Date / Session Number  1/24- at baseline.

## 2025-01-24 NOTE — CARE PLAN
The patient is Stable - Low risk of patient condition declining or worsening    Shift Goals  Clinical Goals: monitor blood glucose  Patient Goals: rest, comfort  Family Goals: vicente    Progress made toward(s) clinical / shift goals:  pt is a&o x4. O2 sats maintained on room air. IV patent and saline locked. No reports of pain at this time. Bed alarm on. All needs currently addressed.    Patient is not progressing towards the following goals:

## 2025-01-25 LAB
ANION GAP SERPL CALC-SCNC: 8 MMOL/L (ref 7–16)
BUN SERPL-MCNC: 27 MG/DL (ref 8–22)
CALCIUM SERPL-MCNC: 8.8 MG/DL (ref 8.5–10.5)
CHLORIDE SERPL-SCNC: 104 MMOL/L (ref 96–112)
CO2 SERPL-SCNC: 29 MMOL/L (ref 20–33)
CREAT SERPL-MCNC: 0.57 MG/DL (ref 0.5–1.4)
GFR SERPLBLD CREATININE-BSD FMLA CKD-EPI: 112 ML/MIN/1.73 M 2
GLUCOSE BLD STRIP.AUTO-MCNC: 162 MG/DL (ref 65–99)
GLUCOSE BLD STRIP.AUTO-MCNC: 162 MG/DL (ref 65–99)
GLUCOSE BLD STRIP.AUTO-MCNC: 182 MG/DL (ref 65–99)
GLUCOSE BLD STRIP.AUTO-MCNC: 182 MG/DL (ref 65–99)
GLUCOSE BLD STRIP.AUTO-MCNC: 56 MG/DL (ref 65–99)
GLUCOSE BLD STRIP.AUTO-MCNC: 64 MG/DL (ref 65–99)
GLUCOSE BLD STRIP.AUTO-MCNC: 69 MG/DL (ref 65–99)
GLUCOSE SERPL-MCNC: 56 MG/DL (ref 65–99)
POTASSIUM SERPL-SCNC: 3.8 MMOL/L (ref 3.6–5.5)
SODIUM SERPL-SCNC: 141 MMOL/L (ref 135–145)

## 2025-01-25 PROCEDURE — 700102 HCHG RX REV CODE 250 W/ 637 OVERRIDE(OP): Performed by: STUDENT IN AN ORGANIZED HEALTH CARE EDUCATION/TRAINING PROGRAM

## 2025-01-25 PROCEDURE — 700102 HCHG RX REV CODE 250 W/ 637 OVERRIDE(OP): Performed by: INTERNAL MEDICINE

## 2025-01-25 PROCEDURE — 700102 HCHG RX REV CODE 250 W/ 637 OVERRIDE(OP)

## 2025-01-25 PROCEDURE — 82962 GLUCOSE BLOOD TEST: CPT | Mod: 91

## 2025-01-25 PROCEDURE — A9270 NON-COVERED ITEM OR SERVICE: HCPCS | Performed by: INTERNAL MEDICINE

## 2025-01-25 PROCEDURE — A9270 NON-COVERED ITEM OR SERVICE: HCPCS | Performed by: STUDENT IN AN ORGANIZED HEALTH CARE EDUCATION/TRAINING PROGRAM

## 2025-01-25 PROCEDURE — 700111 HCHG RX REV CODE 636 W/ 250 OVERRIDE (IP): Mod: JZ | Performed by: INTERNAL MEDICINE

## 2025-01-25 PROCEDURE — 700101 HCHG RX REV CODE 250: Performed by: NURSE PRACTITIONER

## 2025-01-25 PROCEDURE — 80048 BASIC METABOLIC PNL TOTAL CA: CPT

## 2025-01-25 PROCEDURE — A9270 NON-COVERED ITEM OR SERVICE: HCPCS | Performed by: NURSE PRACTITIONER

## 2025-01-25 PROCEDURE — A9270 NON-COVERED ITEM OR SERVICE: HCPCS

## 2025-01-25 PROCEDURE — 700102 HCHG RX REV CODE 250 W/ 637 OVERRIDE(OP): Performed by: NURSE PRACTITIONER

## 2025-01-25 PROCEDURE — 36415 COLL VENOUS BLD VENIPUNCTURE: CPT

## 2025-01-25 PROCEDURE — 770006 HCHG ROOM/CARE - MED/SURG/GYN SEMI*

## 2025-01-25 PROCEDURE — 99233 SBSQ HOSP IP/OBS HIGH 50: CPT | Performed by: INTERNAL MEDICINE

## 2025-01-25 RX ADMIN — GABAPENTIN 200 MG: 100 CAPSULE ORAL at 12:04

## 2025-01-25 RX ADMIN — OXYCODONE 5 MG: 5 TABLET ORAL at 06:36

## 2025-01-25 RX ADMIN — GABAPENTIN 200 MG: 100 CAPSULE ORAL at 17:50

## 2025-01-25 RX ADMIN — GABAPENTIN 200 MG: 100 CAPSULE ORAL at 06:37

## 2025-01-25 RX ADMIN — CYCLOBENZAPRINE 10 MG: 10 TABLET, FILM COATED ORAL at 12:04

## 2025-01-25 RX ADMIN — OMEPRAZOLE 40 MG: 20 CAPSULE, DELAYED RELEASE ORAL at 06:35

## 2025-01-25 RX ADMIN — LIDOCAINE 3 PATCH: 4 PATCH TOPICAL at 17:49

## 2025-01-25 RX ADMIN — DEXTROSE MONOHYDRATE 25 G: 25 INJECTION, SOLUTION INTRAVENOUS at 08:57

## 2025-01-25 RX ADMIN — METFORMIN HYDROCHLORIDE 500 MG: 500 TABLET ORAL at 17:50

## 2025-01-25 RX ADMIN — INSULIN LISPRO 1 UNITS: 100 INJECTION, SOLUTION INTRAVENOUS; SUBCUTANEOUS at 20:56

## 2025-01-25 RX ADMIN — OXYCODONE 5 MG: 5 TABLET ORAL at 12:08

## 2025-01-25 RX ADMIN — OXYCODONE 5 MG: 5 TABLET ORAL at 20:46

## 2025-01-25 RX ADMIN — OMEPRAZOLE 40 MG: 20 CAPSULE, DELAYED RELEASE ORAL at 17:50

## 2025-01-25 RX ADMIN — CYCLOBENZAPRINE 10 MG: 10 TABLET, FILM COATED ORAL at 17:50

## 2025-01-25 RX ADMIN — VENLAFAXINE HYDROCHLORIDE 75 MG: 75 TABLET ORAL at 06:37

## 2025-01-25 RX ADMIN — ENOXAPARIN SODIUM 40 MG: 100 INJECTION SUBCUTANEOUS at 17:49

## 2025-01-25 RX ADMIN — CYCLOBENZAPRINE 10 MG: 10 TABLET, FILM COATED ORAL at 06:37

## 2025-01-25 ASSESSMENT — ENCOUNTER SYMPTOMS
SHORTNESS OF BREATH: 0
NERVOUS/ANXIOUS: 0
NECK PAIN: 0
ABDOMINAL PAIN: 0
DEPRESSION: 0
WEAKNESS: 1
PALPITATIONS: 0
NAUSEA: 0
HEARTBURN: 0
FEVER: 0
MYALGIAS: 1
BACK PAIN: 1
HEADACHES: 0

## 2025-01-25 ASSESSMENT — FIBROSIS 4 INDEX: FIB4 SCORE: 1.665433468816228167

## 2025-01-25 ASSESSMENT — PAIN DESCRIPTION - PAIN TYPE
TYPE: ACUTE PAIN
TYPE: ACUTE PAIN
TYPE: CHRONIC PAIN;NEUROPATHIC PAIN

## 2025-01-25 NOTE — CARE PLAN
The patient is Stable - Low risk of patient condition declining or worsening    Shift Goals  Clinical Goals: Keep pain below 7 through entire shift  Patient Goals: Rest & comfort  Family Goals: KATHERINE    Progress made toward(s) clinical / shift goals: Monitored pt's glucose and administered insulin when appropriate. Administered PRN pain medications. Tried not to wake pt except for when necessary by patient request. Bed in lowest position with breaks on and call light within reach.   Problem: Safety  Goal: Will remain free from injury  Outcome: Progressing     Problem: Safety  Goal: Will remain free from falls  Outcome: Progressing     Problem: Pain Management  Goal: Pain level will decrease to patient's comfort goal  Outcome: Progressing       Patient is not progressing towards the following goals:

## 2025-01-25 NOTE — PROGRESS NOTES
Hospital Medicine Daily Progress Note    Date of Service  1/25/2025    Chief Complaint  Christoph Taylor is a 59 y.o. male admitted 1/9/2025 with weakness.    Hospital Course  Christoph Taylor is a 59-year-old male with prior suicidal ideation, poorly controlled diabetes mellitus type 2, housing insecurity and severe protein calorie malnutrition.  Admitted 1/9 for acute on chronic back pain.    Patient states that he is unable to take care of himself and has a hard time administering insulin, buying food for himself, taking his blood sugars. Additionally says that he has worsening neuropathy of the feet and hands. His pain is worse than ever. He has no additional help here.    In the ER he was found to be hyperglycemic and quite weak.  UA was concerning for possible UTI.  Patient was given 1 dose of IV cefazolin.  However, he denied urinary symptoms and antibiotics were not continued.     In terms of patient's weakness-MRI thoracic and lumbar notable for old compression fracture at T12 and multiple disc bulging without quinton central canal stenosis or cord impingement.  Neurosurgery was consulted.  Dr. Johnson recommending non operative management and outpatient follow-up for possible epidural injection.  PT OT recommending postacute rehabilitation.  However, no accepting facilities were identified.  Patient intermittently refusing to work with PT here.    Home health has been arranged prior to discharge.    Interval Problem Update  1/14: Vitals stable overnight.  -129.  Patient is medically clear for discharge pending placement.    1/15: Vitals remained stable.   through 156.  Glucose ranging 104 through 246.  Case management is working hard to obtain patient's insurance card to evaluate benefits for postacute rehabilitation.  Patient is medically cleared to discharge once insurance barrier has been resolved.    1/16: Vitals stable.  Glucose this a.m. 176.  Range 2 29 through 3 05.  Increase Lantus to  25 units in the p.m.  Multiple long discussions with patient, case management and nursing staff regarding discharge plan.  Patient feels unsafe to discharge to his home.  He has 5 steps.  He does not have a ramp.  He is largely wheelchair-bound due to chronic pain.    1/17: Vital stable overnight.  -130.  Glucose improved with increase Lantus dosing.  A.m. blood sugar 104.  Patient is now being followed by complex discharge committee.    1/18: Vitals notable for SBP .  Glucose ranging 125 through 196.  Patient required 1 unit sliding scale insulin yesterday.  Patient remains medically clear for discharge though a safe discharge plan has not been arranged yet.  Encouraged ambulation at least 3 times daily.    1/19: Vital stable overnight.   through 126.  Glucose range .  Required 2 units SSI over last 24 hours.    1/20: Vitals stable overnight.  Patient endorsing depression and refusal to participate in medical therapies.  I have placed a referral for inpatient psychology/behavioral health.  Increase venlafaxine to 75 mg.  Continue to encourage participation in ambulation and patient rehabilitation.    1/21 Patient able to self transfer to chair, reports ongoing low back pain, pain level of 7/10  States that he is now wheelchair-bound, pending placement,  to assist  Patient with hypoglycemic episode this a.m. was given juice, unable to self administer insulin at home, will need placement assistance    1/22 no new events, sleeping arousable, AFO delivered for PT reeval    1/23 reports low back pain, able to transfer to chair, PT blanca    1/24 ongoing lbp, min activity, Has not worked with PT with AFO, to reconsult  Improving blood sugar    1/25 patient with hypoglycemia this morning, blood sugar of 69, arousable improved after dextrose administration, Lantus decreased patient did work with PT with AFO, encourage activity    I have discussed this patient's plan of care and discharge  plan at IDT rounds today with Case Management, Nursing, Nursing leadership, and other members of the IDT team.    Consultants/Specialty  None at this time     Code Status  Full Code    Disposition  The patient is not medically cleared for discharge to home or a post-acute facility.      I have placed the appropriate orders for post-discharge needs.    Review of Systems  Review of Systems   Constitutional:  Positive for malaise/fatigue. Negative for fever.   Respiratory:  Negative for shortness of breath.    Cardiovascular:  Negative for palpitations.   Gastrointestinal:  Negative for abdominal pain, heartburn and nausea.   Genitourinary:  Negative for dysuria.   Musculoskeletal:  Positive for back pain and myalgias. Negative for neck pain.   Neurological:  Positive for weakness. Negative for headaches.   Psychiatric/Behavioral:  Negative for depression. The patient is not nervous/anxious.         Physical Exam  Temp:  [36 °C (96.8 °F)-36.3 °C (97.4 °F)] 36 °C (96.8 °F)  Pulse:  [78-90] 87  Resp:  [16-18] 16  BP: (105-143)/(53-82) 129/82  SpO2:  [90 %-96 %] 96 %    Physical Exam  Vitals and nursing note reviewed.   Constitutional:       Appearance: Normal appearance. He is ill-appearing. He is not diaphoretic.   HENT:      Mouth/Throat:      Mouth: Mucous membranes are moist.   Eyes:      Extraocular Movements: Extraocular movements intact.      Pupils: Pupils are equal, round, and reactive to light.   Cardiovascular:      Rate and Rhythm: Regular rhythm.   Pulmonary:      Effort: Pulmonary effort is normal. No respiratory distress.   Abdominal:      General: There is no distension.      Palpations: There is no mass.   Skin:     General: Skin is warm.      Coloration: Skin is not jaundiced or pale.   Neurological:      Mental Status: He is alert and oriented to person, place, and time. Mental status is at baseline.      Cranial Nerves: No cranial nerve deficit.      Sensory: No sensory deficit.      Motor: Weakness  present.   Psychiatric:         Mood and Affect: Mood normal.         Behavior: Behavior normal.         Fluids    Intake/Output Summary (Last 24 hours) at 1/25/2025 0953  Last data filed at 1/25/2025 0852  Gross per 24 hour   Intake 480 ml   Output 0 ml   Net 480 ml        Laboratory      Recent Labs     01/25/25  0152   SODIUM 141   POTASSIUM 3.8   CHLORIDE 104   CO2 29   GLUCOSE 56*   BUN 27*   CREATININE 0.57   CALCIUM 8.8                   Imaging  MR-LUMBAR SPINE-W/O   Final Result      1.  T12 chronic compression fracture with anterior wedge deformity.   2.  Postoperative changes with interval L4-5 laminectomy since prior MRI study. There is now laminectomy at L4-5-S1 and there is now posterior fusion with transpedicular screw fixation at L4-L5. Relief of previously seen severe central stenosis at L4-5.   3.  Multilevel degenerative changes as detailed for each level above in the body of report.      MR-THORACIC SPINE-W/O   Final Result      1.  T12 old compression fracture with anterior wedge deformity. Associated mild lower thoracic kyphosis.   2.  T2 old superior endplate compression fracture.   3.  Multilevel disc bulges T3-T4, T7-T8, T8-T9, T11-12, T12-L1. Variable mass effect on the ventral thecal sac without quinton central stenosis or cord impingement at any thoracic level.   4.  No myelopathic cord signal at any thoracic level.      DX-CHEST-PORTABLE (1 VIEW)   Final Result      No acute cardiopulmonary disease evident.           Assessment/Plan  * Compression fracture of body of thoracic vertebra (HCC)- (present on admission)  Assessment & Plan  MRI T-spine with T12 old compression fracture, T2 old superior endplate compression fracture, multiple level disc bulges T3-4 T7-T8 T8-T9, T11-T12, variable mass effect with out  central stenosis or cord impingement, MRI L-spine with T12 compression fracture, previous L4-L5 laminectomy, multilevel degenerative changes.  - Neurosurgery has been consulted  -  Recommending non operative management at this time  - Continue to follow-up outpatient for possible epidural  - Continue gabapentin, lidocaine and oxycodone as needed for pain management  - PT OT recommending postacute rehabilitation  - I personally reviewed MRI images with patient at bedside 1/16 1/21 placement pending, f/u with CM  - ongoing LBP, schedule flexeril    1/22 PT reeval with AFO now available  Pain control, encourage activity, placement pending    1/23 PT  with AFO, encourage activity, pending placement, CM to assist, ? GH  1/24 PT to work with patient with AFO, consult replaced  Encourage activity    1/25 patient worked with PT with AFO, encourage activity    Moderate episode of recurrent major depressive disorder (HCC)  Assessment & Plan  Patient endorsing depression.  Unwillingness to participate in his rehabilitation.  Endorses feelings of helplessness and hopelessness.  Per nursing staff he has expressed SI without a plan.  He does not endorse SI to me; on 2 separate occasions.  - Continue to encourage patient to participate in his rehabilitation.  - Increase venlafaxine to 75 mg  - Inpatient behavioral health referral has been placed.  - I do not see an indication to initiate a legal hold at this point.  Patient denies SI and SI plan to me.  Per nursing staff patient does become agitated when his snacks are withheld.  We will continue to monitor the situation closely.    Bacteriuria- (present on admission)  Assessment & Plan  No abx's for now given lack of symptoms    Physical debility- (present on admission)  Assessment & Plan  MRI with compression T2 and T12 fracture, multilevel bulging disc through thoracic spine.  No cord impingement.  Neurosurgery recommended nonoperative management  Follow-up for epidural injection as outpatient  Placement is pending    1/21 Patient reports ambulatory status prior to this admission, to follow-up with orthopedic surgery for AFO evaluation  , Will  reconsult PT OT for AFO as needed, will consult orthopedic surgery as needed  -Patient reports low back pain, has Zanaflex as needed, will add Flexeril 3 times daily  Continue pain control  Encourage activity    Acute hypoxic respiratory failure (HCC)- (present on admission)  Assessment & Plan  Resolved  Now on room air, continue to monitor oxygen requirement    Elevated troponin- (present on admission)  Assessment & Plan  Chronically elevated. Baseline  - no further workup    Uncontrolled type 2 diabetes mellitus with hyperglycemia (HCC)- (present on admission)  Assessment & Plan  A1C 9.8  - Continue Lantus to 25 units in the evening  - Continue sliding scale insulin; has required 2 units SSI in last 24 hours  - Patient states he is unable to drop insulin at home due to hand coordination issues.  We will try to minimize sliding scale insulin and discharged with only Lantus pens.  - Monitor BG trend.   - Accu-Cheks before meals and at bedtime.   - Goal to keep BG between 140-180 per 2019 ADA guidelines     1/21 hypoglycemia this am, decrease Lantus to 18 units, cont ssi  - f/u am labs    1/22 bs improved, on lantus 18 units, cont and monitor  ? GH    1/23 improvng bs, increase Lantus 20 u, add metformin, monitor    1/24 improving, cont lantus, f/u am bmp, consider increasing metformin    1/25 hypoglycemia this a.m., decreased metformin and Lantus, monitor closely    CKD (chronic kidney disease)- (present on admission)  Assessment & Plan  Creatinine stable at 0.57       VTE prophylaxis:   SCDs/TEDs      I have performed a physical exam and reviewed and updated ROS and Plan today (1/25/2025). In review of yesterday's note (1/24/2025), there are no changes except as documented above.

## 2025-01-25 NOTE — PROGRESS NOTES
Hypoglycemia Intervention    Hypoglycemia protocol intervention:  Blood glucose 69 at 0819  Intervention: 8 oz of fruit juice   Repeat blood glucose 56 at 0839  Intervention: 4 oz of fruit juice   Repeat blood glucose 64 at 0855  Intervention: 25 g IV dextrose per MAR   Repeat blood glucose 162 at 0919  Intervention: Carb/Protein snack given, recheck blood glucose in 1 hour   Additional interventions needed: N/A  Dr. Chatterjee notified of the above at 0931

## 2025-01-25 NOTE — CARE PLAN
The patient is Stable - Low risk of patient condition declining or worsening    Shift Goals  Clinical Goals: monitior blood glucose  Patient Goals: rest, comfort, food  Family Goals: vicente    Progress made toward(s) clinical / shift goals:  pt is a&o x4. O2 sats maintained on room air. IV patent and saline locked. Pain medication given per MAR. All needs currently addressed.    Patient is not progressing towards the following goals: pt hypoglycemic this morning. Gave IV dextrose. Ok to hold 1200 insulin per DO.

## 2025-01-26 ENCOUNTER — APPOINTMENT (OUTPATIENT)
Dept: RADIOLOGY | Facility: MEDICAL CENTER | Age: 60
DRG: 542 | End: 2025-01-26
Attending: NURSE PRACTITIONER
Payer: COMMERCIAL

## 2025-01-26 LAB
ALBUMIN SERPL BCP-MCNC: 3.2 G/DL (ref 3.2–4.9)
ALBUMIN/GLOB SERPL: 1.3 G/DL
ALP SERPL-CCNC: 273 U/L (ref 30–99)
ALT SERPL-CCNC: 408 U/L (ref 2–50)
ANION GAP SERPL CALC-SCNC: 12 MMOL/L (ref 7–16)
ANION GAP SERPL CALC-SCNC: 8 MMOL/L (ref 7–16)
AST SERPL-CCNC: 648 U/L (ref 12–45)
BASOPHILS # BLD AUTO: 0 % (ref 0–1.8)
BASOPHILS # BLD: 0 K/UL (ref 0–0.12)
BILIRUB SERPL-MCNC: <0.2 MG/DL (ref 0.1–1.5)
BUN SERPL-MCNC: 26 MG/DL (ref 8–22)
BUN SERPL-MCNC: 40 MG/DL (ref 8–22)
CALCIUM ALBUM COR SERPL-MCNC: 8.9 MG/DL (ref 8.5–10.5)
CALCIUM SERPL-MCNC: 8.3 MG/DL (ref 8.5–10.5)
CALCIUM SERPL-MCNC: 8.6 MG/DL (ref 8.5–10.5)
CHLORIDE SERPL-SCNC: 104 MMOL/L (ref 96–112)
CHLORIDE SERPL-SCNC: 98 MMOL/L (ref 96–112)
CO2 SERPL-SCNC: 27 MMOL/L (ref 20–33)
CO2 SERPL-SCNC: 31 MMOL/L (ref 20–33)
CREAT SERPL-MCNC: 0.72 MG/DL (ref 0.5–1.4)
CREAT SERPL-MCNC: 1.34 MG/DL (ref 0.5–1.4)
EOSINOPHIL # BLD AUTO: 0.04 K/UL (ref 0–0.51)
EOSINOPHIL NFR BLD: 0.6 % (ref 0–6.9)
ERYTHROCYTE [DISTWIDTH] IN BLOOD BY AUTOMATED COUNT: 41.7 FL (ref 35.9–50)
GFR SERPLBLD CREATININE-BSD FMLA CKD-EPI: 104 ML/MIN/1.73 M 2
GFR SERPLBLD CREATININE-BSD FMLA CKD-EPI: 60 ML/MIN/1.73 M 2
GLOBULIN SER CALC-MCNC: 2.5 G/DL (ref 1.9–3.5)
GLUCOSE BLD STRIP.AUTO-MCNC: 103 MG/DL (ref 65–99)
GLUCOSE BLD STRIP.AUTO-MCNC: 110 MG/DL (ref 65–99)
GLUCOSE BLD STRIP.AUTO-MCNC: 139 MG/DL (ref 65–99)
GLUCOSE BLD STRIP.AUTO-MCNC: 174 MG/DL (ref 65–99)
GLUCOSE BLD STRIP.AUTO-MCNC: 202 MG/DL (ref 65–99)
GLUCOSE SERPL-MCNC: 207 MG/DL (ref 65–99)
GLUCOSE SERPL-MCNC: 80 MG/DL (ref 65–99)
HCT VFR BLD AUTO: 29.2 % (ref 42–52)
HGB BLD-MCNC: 9.2 G/DL (ref 14–18)
IMM GRANULOCYTES # BLD AUTO: 0.03 K/UL (ref 0–0.11)
IMM GRANULOCYTES NFR BLD AUTO: 0.5 % (ref 0–0.9)
INR PPP: 1.04 (ref 0.87–1.13)
LACTATE SERPL-SCNC: 2.5 MMOL/L (ref 0.5–2)
LYMPHOCYTES # BLD AUTO: 0.14 K/UL (ref 1–4.8)
LYMPHOCYTES NFR BLD: 2.2 % (ref 22–41)
MCH RBC QN AUTO: 25.9 PG (ref 27–33)
MCHC RBC AUTO-ENTMCNC: 31.5 G/DL (ref 32.3–36.5)
MCV RBC AUTO: 82.3 FL (ref 81.4–97.8)
MONOCYTES # BLD AUTO: 0.07 K/UL (ref 0–0.85)
MONOCYTES NFR BLD AUTO: 1.1 % (ref 0–13.4)
NEUTROPHILS # BLD AUTO: 6.11 K/UL (ref 1.82–7.42)
NEUTROPHILS NFR BLD: 95.6 % (ref 44–72)
NRBC # BLD AUTO: 0 K/UL
NRBC BLD-RTO: 0 /100 WBC (ref 0–0.2)
PLATELET # BLD AUTO: 179 K/UL (ref 164–446)
PMV BLD AUTO: 8.9 FL (ref 9–12.9)
POTASSIUM SERPL-SCNC: 4.3 MMOL/L (ref 3.6–5.5)
POTASSIUM SERPL-SCNC: 4.7 MMOL/L (ref 3.6–5.5)
PROCALCITONIN SERPL-MCNC: 36 NG/ML
PROT SERPL-MCNC: 5.7 G/DL (ref 6–8.2)
PROTHROMBIN TIME: 13.6 SEC (ref 12–14.6)
RBC # BLD AUTO: 3.55 M/UL (ref 4.7–6.1)
SODIUM SERPL-SCNC: 137 MMOL/L (ref 135–145)
SODIUM SERPL-SCNC: 143 MMOL/L (ref 135–145)
WBC # BLD AUTO: 6.4 K/UL (ref 4.8–10.8)

## 2025-01-26 PROCEDURE — 85025 COMPLETE CBC W/AUTO DIFF WBC: CPT

## 2025-01-26 PROCEDURE — 99232 SBSQ HOSP IP/OBS MODERATE 35: CPT | Performed by: INTERNAL MEDICINE

## 2025-01-26 PROCEDURE — 80053 COMPREHEN METABOLIC PANEL: CPT

## 2025-01-26 PROCEDURE — 700102 HCHG RX REV CODE 250 W/ 637 OVERRIDE(OP)

## 2025-01-26 PROCEDURE — 84145 PROCALCITONIN (PCT): CPT

## 2025-01-26 PROCEDURE — A9270 NON-COVERED ITEM OR SERVICE: HCPCS | Performed by: STUDENT IN AN ORGANIZED HEALTH CARE EDUCATION/TRAINING PROGRAM

## 2025-01-26 PROCEDURE — 700101 HCHG RX REV CODE 250: Performed by: NURSE PRACTITIONER

## 2025-01-26 PROCEDURE — 87046 STOOL CULTR AEROBIC BACT EA: CPT

## 2025-01-26 PROCEDURE — 770006 HCHG ROOM/CARE - MED/SURG/GYN SEMI*

## 2025-01-26 PROCEDURE — 87045 FECES CULTURE AEROBIC BACT: CPT

## 2025-01-26 PROCEDURE — A9270 NON-COVERED ITEM OR SERVICE: HCPCS | Performed by: INTERNAL MEDICINE

## 2025-01-26 PROCEDURE — 83605 ASSAY OF LACTIC ACID: CPT

## 2025-01-26 PROCEDURE — A9270 NON-COVERED ITEM OR SERVICE: HCPCS | Performed by: NURSE PRACTITIONER

## 2025-01-26 PROCEDURE — 700102 HCHG RX REV CODE 250 W/ 637 OVERRIDE(OP): Performed by: INTERNAL MEDICINE

## 2025-01-26 PROCEDURE — 71045 X-RAY EXAM CHEST 1 VIEW: CPT

## 2025-01-26 PROCEDURE — 700102 HCHG RX REV CODE 250 W/ 637 OVERRIDE(OP): Performed by: STUDENT IN AN ORGANIZED HEALTH CARE EDUCATION/TRAINING PROGRAM

## 2025-01-26 PROCEDURE — 0241U HCHG SARS-COV-2 COVID-19 NFCT DS RESP RNA 4 TRGT MIC: CPT

## 2025-01-26 PROCEDURE — 87899 AGENT NOS ASSAY W/OPTIC: CPT

## 2025-01-26 PROCEDURE — 85610 PROTHROMBIN TIME: CPT

## 2025-01-26 PROCEDURE — 700102 HCHG RX REV CODE 250 W/ 637 OVERRIDE(OP): Performed by: NURSE PRACTITIONER

## 2025-01-26 PROCEDURE — A9270 NON-COVERED ITEM OR SERVICE: HCPCS

## 2025-01-26 PROCEDURE — 700111 HCHG RX REV CODE 636 W/ 250 OVERRIDE (IP): Mod: JZ | Performed by: INTERNAL MEDICINE

## 2025-01-26 PROCEDURE — 82962 GLUCOSE BLOOD TEST: CPT | Mod: 91

## 2025-01-26 PROCEDURE — 36415 COLL VENOUS BLD VENIPUNCTURE: CPT

## 2025-01-26 PROCEDURE — 700105 HCHG RX REV CODE 258: Performed by: NURSE PRACTITIONER

## 2025-01-26 PROCEDURE — 80048 BASIC METABOLIC PNL TOTAL CA: CPT

## 2025-01-26 RX ORDER — CYCLOBENZAPRINE HCL 10 MG
10 TABLET ORAL 3 TIMES DAILY
Status: CANCELLED | OUTPATIENT
Start: 2025-01-27

## 2025-01-26 RX ORDER — SODIUM CHLORIDE, SODIUM LACTATE, POTASSIUM CHLORIDE, AND CALCIUM CHLORIDE .6; .31; .03; .02 G/100ML; G/100ML; G/100ML; G/100ML
30 INJECTION, SOLUTION INTRAVENOUS ONCE
Status: COMPLETED | OUTPATIENT
Start: 2025-01-26 | End: 2025-01-27

## 2025-01-26 RX ADMIN — ENOXAPARIN SODIUM 40 MG: 100 INJECTION SUBCUTANEOUS at 17:44

## 2025-01-26 RX ADMIN — LIDOCAINE 3 PATCH: 4 PATCH TOPICAL at 16:18

## 2025-01-26 RX ADMIN — INSULIN LISPRO 2 UNITS: 100 INJECTION, SOLUTION INTRAVENOUS; SUBCUTANEOUS at 21:01

## 2025-01-26 RX ADMIN — GABAPENTIN 200 MG: 100 CAPSULE ORAL at 12:04

## 2025-01-26 RX ADMIN — SODIUM CHLORIDE, POTASSIUM CHLORIDE, SODIUM LACTATE AND CALCIUM CHLORIDE 2235 ML: 600; 310; 30; 20 INJECTION, SOLUTION INTRAVENOUS at 22:35

## 2025-01-26 RX ADMIN — INSULIN LISPRO 1 UNITS: 100 INJECTION, SOLUTION INTRAVENOUS; SUBCUTANEOUS at 12:04

## 2025-01-26 RX ADMIN — CYCLOBENZAPRINE 10 MG: 10 TABLET, FILM COATED ORAL at 17:44

## 2025-01-26 RX ADMIN — GABAPENTIN 200 MG: 100 CAPSULE ORAL at 17:44

## 2025-01-26 RX ADMIN — CYCLOBENZAPRINE 10 MG: 10 TABLET, FILM COATED ORAL at 12:04

## 2025-01-26 RX ADMIN — METFORMIN HYDROCHLORIDE 500 MG: 500 TABLET ORAL at 17:44

## 2025-01-26 RX ADMIN — OXYCODONE 5 MG: 5 TABLET ORAL at 04:54

## 2025-01-26 RX ADMIN — GABAPENTIN 200 MG: 100 CAPSULE ORAL at 04:53

## 2025-01-26 RX ADMIN — VENLAFAXINE HYDROCHLORIDE 75 MG: 75 TABLET ORAL at 04:53

## 2025-01-26 RX ADMIN — OMEPRAZOLE 40 MG: 20 CAPSULE, DELAYED RELEASE ORAL at 04:54

## 2025-01-26 RX ADMIN — OXYCODONE 5 MG: 5 TABLET ORAL at 22:24

## 2025-01-26 RX ADMIN — OXYCODONE 5 MG: 5 TABLET ORAL at 16:18

## 2025-01-26 RX ADMIN — OMEPRAZOLE 40 MG: 20 CAPSULE, DELAYED RELEASE ORAL at 17:44

## 2025-01-26 RX ADMIN — CYCLOBENZAPRINE 10 MG: 10 TABLET, FILM COATED ORAL at 04:54

## 2025-01-26 RX ADMIN — ACETAMINOPHEN 1000 MG: 500 TABLET ORAL at 21:09

## 2025-01-26 RX ADMIN — METFORMIN HYDROCHLORIDE 500 MG: 500 TABLET ORAL at 08:18

## 2025-01-26 ASSESSMENT — PAIN DESCRIPTION - PAIN TYPE
TYPE: CHRONIC PAIN

## 2025-01-26 ASSESSMENT — ENCOUNTER SYMPTOMS
NAUSEA: 0
CHILLS: 0
HEADACHES: 0
WEAKNESS: 1
COUGH: 0
ABDOMINAL PAIN: 0
PALPITATIONS: 0
MYALGIAS: 1
DEPRESSION: 0
SHORTNESS OF BREATH: 0
FEVER: 0
NERVOUS/ANXIOUS: 0
BACK PAIN: 1
HEARTBURN: 0
DIZZINESS: 0

## 2025-01-26 NOTE — CARE PLAN
The patient is Stable - Low risk of patient condition declining or worsening    Shift Goals  Clinical Goals: pain control  Patient Goals: cluster care, sleep, snacks  Family Goals: KATHERINE    Progress made toward(s) clinical / shift goals:  pain controlled with oxy PRN, x1 to [facility].    Patient is not progressing towards the following goals: n/a    Problem: Safety  Goal: Will remain free from injury  Outcome: Progressing     Problem: Pain Management  Goal: Pain level will decrease to patient's comfort goal  Outcome: Progressing     Problem: Discharge Barriers/Planning  Goal: Patient's continuum of care needs are met  Outcome: Progressing     Problem: Bowel Elimination  Goal: Establish and maintain regular bowel function  Outcome: Progressing

## 2025-01-26 NOTE — PROGRESS NOTES
Hospital Medicine Daily Progress Note    Date of Service  1/26/2025    Chief Complaint  Christoph Taylor is a 59 y.o. male admitted 1/9/2025 with weakness.    Hospital Course  Christoph Taylor is a 59-year-old male with prior suicidal ideation, poorly controlled diabetes mellitus type 2, housing insecurity and severe protein calorie malnutrition.  Admitted 1/9 for acute on chronic back pain.    Patient states that he is unable to take care of himself and has a hard time administering insulin, buying food for himself, taking his blood sugars. Additionally says that he has worsening neuropathy of the feet and hands. His pain is worse than ever. He has no additional help here.    In the ER he was found to be hyperglycemic and quite weak.  UA was concerning for possible UTI.  Patient was given 1 dose of IV cefazolin.  However, he denied urinary symptoms and antibiotics were not continued.     In terms of patient's weakness-MRI thoracic and lumbar notable for old compression fracture at T12 and multiple disc bulging without quinton central canal stenosis or cord impingement.  Neurosurgery was consulted.  Dr. Johnson recommending non operative management and outpatient follow-up for possible epidural injection.  PT OT recommending postacute rehabilitation.  However, no accepting facilities were identified.  Patient intermittently refusing to work with PT here.    Home health has been arranged prior to discharge.    Interval Problem Update  1/14: Vitals stable overnight.  -129.  Patient is medically clear for discharge pending placement.    1/15: Vitals remained stable.   through 156.  Glucose ranging 104 through 246.  Case management is working hard to obtain patient's insurance card to evaluate benefits for postacute rehabilitation.  Patient is medically cleared to discharge once insurance barrier has been resolved.    1/16: Vitals stable.  Glucose this a.m. 176.  Range 2 29 through 3 05.  Increase Lantus to  25 units in the p.m.  Multiple long discussions with patient, case management and nursing staff regarding discharge plan.  Patient feels unsafe to discharge to his home.  He has 5 steps.  He does not have a ramp.  He is largely wheelchair-bound due to chronic pain.    1/17: Vital stable overnight.  -130.  Glucose improved with increase Lantus dosing.  A.m. blood sugar 104.  Patient is now being followed by complex discharge committee.    1/18: Vitals notable for SBP .  Glucose ranging 125 through 196.  Patient required 1 unit sliding scale insulin yesterday.  Patient remains medically clear for discharge though a safe discharge plan has not been arranged yet.  Encouraged ambulation at least 3 times daily.    1/19: Vital stable overnight.   through 126.  Glucose range .  Required 2 units SSI over last 24 hours.    1/20: Vitals stable overnight.  Patient endorsing depression and refusal to participate in medical therapies.  I have placed a referral for inpatient psychology/behavioral health.  Increase venlafaxine to 75 mg.  Continue to encourage participation in ambulation and patient rehabilitation.    1/21 Patient able to self transfer to chair, reports ongoing low back pain, pain level of 7/10  States that he is now wheelchair-bound, pending placement,  to assist  Patient with hypoglycemic episode this a.m. was given juice, unable to self administer insulin at home, will need placement assistance    1/22 no new events, sleeping arousable, AFO delivered for PT reeval    1/23 reports low back pain, able to transfer to chair, PT blanca    1/24 ongoing lbp, min activity, Has not worked with PT with AFO, to reconsult  Improving blood sugar    1/25 patient with hypoglycemia this morning, blood sugar of 69, arousable improved after dextrose administration, Lantus decreased patient did work with PT with AFO, encourage activity    1/26 improved blood sugar, patient sitting up in chair, no  new complaints  Patient did have increased activity with AFO, continue to encourage activity    I have discussed this patient's plan of care and discharge plan at IDT rounds today with Case Management, Nursing, Nursing leadership, and other members of the IDT team.    Consultants/Specialty  None at this time     Code Status  Full Code    Disposition  The patient is not medically cleared for discharge to home or a post-acute facility.      I have placed the appropriate orders for post-discharge needs.    Review of Systems  Review of Systems   Constitutional:  Negative for chills, fever and malaise/fatigue.   Respiratory:  Negative for cough and shortness of breath.    Cardiovascular:  Negative for palpitations.   Gastrointestinal:  Negative for abdominal pain, heartburn and nausea.   Genitourinary:  Negative for dysuria.   Musculoskeletal:  Positive for back pain and myalgias.   Neurological:  Positive for weakness. Negative for dizziness and headaches.   Psychiatric/Behavioral:  Negative for depression. The patient is not nervous/anxious.         Physical Exam  Temp:  [36.1 °C (96.9 °F)-36.4 °C (97.5 °F)] 36.4 °C (97.5 °F)  Pulse:  [70-85] 85  Resp:  [17-18] 18  BP: (102-136)/(49-83) 133/75  SpO2:  [90 %-91 %] 90 %    Physical Exam  Vitals and nursing note reviewed.   Constitutional:       General: He is not in acute distress.     Appearance: Normal appearance. He is ill-appearing.   HENT:      Mouth/Throat:      Mouth: Mucous membranes are moist.   Eyes:      Extraocular Movements: Extraocular movements intact.      Pupils: Pupils are equal, round, and reactive to light.   Cardiovascular:      Rate and Rhythm: Regular rhythm.   Pulmonary:      Effort: Pulmonary effort is normal. No respiratory distress.   Abdominal:      General: There is no distension.      Palpations: There is no mass.      Tenderness: There is no abdominal tenderness.   Skin:     General: Skin is warm.      Coloration: Skin is not pale.    Neurological:      Mental Status: He is alert and oriented to person, place, and time. Mental status is at baseline.      Cranial Nerves: No cranial nerve deficit.      Sensory: No sensory deficit.      Motor: Weakness present.      Coordination: Coordination normal.   Psychiatric:         Mood and Affect: Mood normal.         Behavior: Behavior normal.         Fluids    Intake/Output Summary (Last 24 hours) at 1/26/2025 1041  Last data filed at 1/26/2025 1012  Gross per 24 hour   Intake 1080 ml   Output 400 ml   Net 680 ml        Laboratory      Recent Labs     01/25/25  0152 01/26/25  0314   SODIUM 141 143   POTASSIUM 3.8 4.3   CHLORIDE 104 104   CO2 29 31   GLUCOSE 56* 80   BUN 27* 26*   CREATININE 0.57 0.72   CALCIUM 8.8 8.6                   Imaging  MR-LUMBAR SPINE-W/O   Final Result      1.  T12 chronic compression fracture with anterior wedge deformity.   2.  Postoperative changes with interval L4-5 laminectomy since prior MRI study. There is now laminectomy at L4-5-S1 and there is now posterior fusion with transpedicular screw fixation at L4-L5. Relief of previously seen severe central stenosis at L4-5.   3.  Multilevel degenerative changes as detailed for each level above in the body of report.      MR-THORACIC SPINE-W/O   Final Result      1.  T12 old compression fracture with anterior wedge deformity. Associated mild lower thoracic kyphosis.   2.  T2 old superior endplate compression fracture.   3.  Multilevel disc bulges T3-T4, T7-T8, T8-T9, T11-12, T12-L1. Variable mass effect on the ventral thecal sac without quinton central stenosis or cord impingement at any thoracic level.   4.  No myelopathic cord signal at any thoracic level.      DX-CHEST-PORTABLE (1 VIEW)   Final Result      No acute cardiopulmonary disease evident.           Assessment/Plan  * Compression fracture of body of thoracic vertebra (HCC)- (present on admission)  Assessment & Plan  MRI T-spine with T12 old compression fracture, T2  old superior endplate compression fracture, multiple level disc bulges T3-4 T7-T8 T8-T9, T11-T12, variable mass effect with out  central stenosis or cord impingement, MRI L-spine with T12 compression fracture, previous L4-L5 laminectomy, multilevel degenerative changes.  - Neurosurgery has been consulted  - Recommending non operative management at this time  - Continue to follow-up outpatient for possible epidural  - Continue gabapentin, lidocaine and oxycodone as needed for pain management  - PT OT recommending postacute rehabilitation  - I personally reviewed MRI images with patient at bedside 1/16 1/21 placement pending, f/u with CM  - ongoing LBP, schedule flexeril    1/22 PT reeval with AFO now available  Pain control, encourage activity, placement pending    1/23 PT  with AFO, encourage activity, pending placement, CM to assist, ? GH  1/24 PT to work with patient with AFO, consult replaced  Encourage activity    1/25 patient worked with PT with AFO, encourage activity    1/26 cont activity, placement pending    Moderate episode of recurrent major depressive disorder (HCC)  Assessment & Plan  Patient endorsing depression.  Unwillingness to participate in his rehabilitation.  Endorses feelings of helplessness and hopelessness.  Per nursing staff he has expressed SI without a plan.  He does not endorse SI to me; on 2 separate occasions.  - Continue to encourage patient to participate in his rehabilitation.  - Increase venlafaxine to 75 mg  - Inpatient behavioral health referral has been placed.  - I do not see an indication to initiate a legal hold at this point.  Patient denies SI and SI plan to me.  Per nursing staff patient does become agitated when his snacks are withheld.  We will continue to monitor the situation closely.    Bacteriuria- (present on admission)  Assessment & Plan  No abx's for now given lack of symptoms    Physical debility- (present on admission)  Assessment & Plan  MRI with compression  T2 and T12 fracture, multilevel bulging disc through thoracic spine.  No cord impingement.  Neurosurgery recommended nonoperative management  Follow-up for epidural injection as outpatient  Placement is pending    1/21 Patient reports ambulatory status prior to this admission, to follow-up with orthopedic surgery for AFO evaluation  , Will reconsult PT OT for AFO as needed, will consult orthopedic surgery as needed  -Patient reports low back pain, has Zanaflex as needed, will add Flexeril 3 times daily  Continue pain control  Encourage activity    Acute hypoxic respiratory failure (HCC)- (present on admission)  Assessment & Plan  Resolved  Now on room air, continue to monitor oxygen requirement    Elevated troponin- (present on admission)  Assessment & Plan  Chronically elevated. Baseline  - no further workup    Uncontrolled type 2 diabetes mellitus with hyperglycemia (HCC)- (present on admission)  Assessment & Plan  A1C 9.8  - Continue Lantus to 25 units in the evening  - Continue sliding scale insulin; has required 2 units SSI in last 24 hours  - Patient states he is unable to drop insulin at home due to hand coordination issues.  We will try to minimize sliding scale insulin and discharged with only Lantus pens.  - Monitor BG trend.   - Accu-Cheks before meals and at bedtime.   - Goal to keep BG between 140-180 per 2019 ADA guidelines     1/21 hypoglycemia this am, decrease Lantus to 18 units, cont ssi  - f/u am labs    1/22 bs improved, on lantus 18 units, cont and monitor  ? GH    1/23 improvng bs, increase Lantus 20 u, add metformin, monitor    1/24 improving, cont lantus, f/u am bmp, consider increasing metformin    1/25 hypoglycemia this a.m., decreased metformin and Lantus, monitor closely    CKD (chronic kidney disease)- (present on admission)  Assessment & Plan  Creatinine stable at 0.57       VTE prophylaxis:   SCDs/TEDs      I have performed a physical exam and reviewed and updated ROS and Plan today  (1/26/2025). In review of yesterday's note (1/25/2025), there are no changes except as documented above.

## 2025-01-26 NOTE — CARE PLAN
The patient is Watcher - Medium risk of patient condition declining or worsening    Shift Goals  Clinical Goals: Blood glucose controlled throughout shift  Patient Goals: Maintain comfort throughout shift  Family Goals: KATHERINE    Progress made toward(s) clinical / shift goals: 's blood glucose has been  during this shift. He is tolerating his insulin medications well.     Patient is not progressing towards the following goals:

## 2025-01-27 ENCOUNTER — APPOINTMENT (OUTPATIENT)
Dept: RADIOLOGY | Facility: MEDICAL CENTER | Age: 60
End: 2025-01-27
Attending: NURSE PRACTITIONER
Payer: COMMERCIAL

## 2025-01-27 PROBLEM — A41.9 SEPSIS (HCC): Status: ACTIVE | Noted: 2025-01-27

## 2025-01-27 LAB
ALBUMIN SERPL BCP-MCNC: 2.9 G/DL (ref 3.2–4.9)
ALBUMIN/GLOB SERPL: 1.3 G/DL
ALP SERPL-CCNC: 213 U/L (ref 30–99)
ALT SERPL-CCNC: 356 U/L (ref 2–50)
ANION GAP SERPL CALC-SCNC: 9 MMOL/L (ref 7–16)
APPEARANCE UR: CLEAR
AST SERPL-CCNC: 426 U/L (ref 12–45)
BACTERIA #/AREA URNS HPF: NORMAL /HPF
BACTERIA BLD CULT: NORMAL
BACTERIA BLD CULT: NORMAL
BASOPHILS # BLD AUTO: 0.2 % (ref 0–1.8)
BASOPHILS # BLD: 0.02 K/UL (ref 0–0.12)
BILIRUB SERPL-MCNC: 0.3 MG/DL (ref 0.1–1.5)
BILIRUB UR QL STRIP.AUTO: NEGATIVE
BUN SERPL-MCNC: 40 MG/DL (ref 8–22)
CALCIUM ALBUM COR SERPL-MCNC: 8.9 MG/DL (ref 8.5–10.5)
CALCIUM SERPL-MCNC: 8 MG/DL (ref 8.5–10.5)
CASTS URNS QL MICRO: NORMAL /LPF (ref 0–2)
CHLORIDE SERPL-SCNC: 98 MMOL/L (ref 96–112)
CO2 SERPL-SCNC: 27 MMOL/L (ref 20–33)
COLOR UR: YELLOW
CREAT SERPL-MCNC: 1.34 MG/DL (ref 0.5–1.4)
E COLI SXT1+2 STL IA: NORMAL
EOSINOPHIL # BLD AUTO: 0.02 K/UL (ref 0–0.51)
EOSINOPHIL NFR BLD: 0.2 % (ref 0–6.9)
EPITHELIAL CELLS 1715: NORMAL /HPF (ref 0–5)
ERYTHROCYTE [DISTWIDTH] IN BLOOD BY AUTOMATED COUNT: 42.2 FL (ref 35.9–50)
FLUAV RNA SPEC QL NAA+PROBE: NEGATIVE
FLUBV RNA SPEC QL NAA+PROBE: NEGATIVE
GFR SERPLBLD CREATININE-BSD FMLA CKD-EPI: 60 ML/MIN/1.73 M 2
GLOBULIN SER CALC-MCNC: 2.2 G/DL (ref 1.9–3.5)
GLUCOSE BLD STRIP.AUTO-MCNC: 141 MG/DL (ref 65–99)
GLUCOSE BLD STRIP.AUTO-MCNC: 159 MG/DL (ref 65–99)
GLUCOSE BLD STRIP.AUTO-MCNC: 162 MG/DL (ref 65–99)
GLUCOSE BLD STRIP.AUTO-MCNC: 218 MG/DL (ref 65–99)
GLUCOSE SERPL-MCNC: 215 MG/DL (ref 65–99)
GLUCOSE UR STRIP.AUTO-MCNC: NEGATIVE MG/DL
HCT VFR BLD AUTO: 26.1 % (ref 42–52)
HGB BLD-MCNC: 8.3 G/DL (ref 14–18)
IMM GRANULOCYTES # BLD AUTO: 0.06 K/UL (ref 0–0.11)
IMM GRANULOCYTES NFR BLD AUTO: 0.7 % (ref 0–0.9)
KETONES UR STRIP.AUTO-MCNC: NEGATIVE MG/DL
LACTATE SERPL-SCNC: 1.3 MMOL/L (ref 0.5–2)
LACTATE SERPL-SCNC: 2 MMOL/L (ref 0.5–2)
LEUKOCYTE ESTERASE UR QL STRIP.AUTO: NEGATIVE
LYMPHOCYTES # BLD AUTO: 0.22 K/UL (ref 1–4.8)
LYMPHOCYTES NFR BLD: 2.6 % (ref 22–41)
MCH RBC QN AUTO: 26.1 PG (ref 27–33)
MCHC RBC AUTO-ENTMCNC: 31.8 G/DL (ref 32.3–36.5)
MCV RBC AUTO: 82.1 FL (ref 81.4–97.8)
MICRO URNS: ABNORMAL
MONOCYTES # BLD AUTO: 0.15 K/UL (ref 0–0.85)
MONOCYTES NFR BLD AUTO: 1.8 % (ref 0–13.4)
NEUTROPHILS # BLD AUTO: 8.07 K/UL (ref 1.82–7.42)
NEUTROPHILS NFR BLD: 94.5 % (ref 44–72)
NITRITE UR QL STRIP.AUTO: NEGATIVE
NRBC # BLD AUTO: 0 K/UL
NRBC BLD-RTO: 0 /100 WBC (ref 0–0.2)
PH UR STRIP.AUTO: 5.5 [PH] (ref 5–8)
PLATELET # BLD AUTO: 179 K/UL (ref 164–446)
PMV BLD AUTO: 9.1 FL (ref 9–12.9)
POTASSIUM SERPL-SCNC: 4.8 MMOL/L (ref 3.6–5.5)
PROT SERPL-MCNC: 5.1 G/DL (ref 6–8.2)
PROT UR QL STRIP: 100 MG/DL
RBC # BLD AUTO: 3.18 M/UL (ref 4.7–6.1)
RBC # URNS HPF: NORMAL /HPF (ref 0–2)
RBC UR QL AUTO: NEGATIVE
RSV RNA SPEC QL NAA+PROBE: NEGATIVE
SARS-COV-2 RNA RESP QL NAA+PROBE: NOTDETECTED
SCCMEC + MECA PNL NOSE NAA+PROBE: NEGATIVE
SIGNIFICANT IND 70042: NORMAL
SITE SITE: NORMAL
SODIUM SERPL-SCNC: 134 MMOL/L (ref 135–145)
SOURCE SOURCE: NORMAL
SP GR UR STRIP.AUTO: 1.01
SPECIMEN SOURCE: NORMAL
UROBILINOGEN UR STRIP.AUTO-MCNC: 1 EU/DL
WBC # BLD AUTO: 8.5 K/UL (ref 4.8–10.8)
WBC #/AREA URNS HPF: NORMAL /HPF

## 2025-01-27 PROCEDURE — 51798 US URINE CAPACITY MEASURE: CPT

## 2025-01-27 PROCEDURE — 700102 HCHG RX REV CODE 250 W/ 637 OVERRIDE(OP): Performed by: INTERNAL MEDICINE

## 2025-01-27 PROCEDURE — 83605 ASSAY OF LACTIC ACID: CPT | Mod: 91

## 2025-01-27 PROCEDURE — 81001 URINALYSIS AUTO W/SCOPE: CPT

## 2025-01-27 PROCEDURE — 700111 HCHG RX REV CODE 636 W/ 250 OVERRIDE (IP): Mod: JZ | Performed by: INTERNAL MEDICINE

## 2025-01-27 PROCEDURE — A9270 NON-COVERED ITEM OR SERVICE: HCPCS | Performed by: INTERNAL MEDICINE

## 2025-01-27 PROCEDURE — 99233 SBSQ HOSP IP/OBS HIGH 50: CPT | Performed by: INTERNAL MEDICINE

## 2025-01-27 PROCEDURE — A9270 NON-COVERED ITEM OR SERVICE: HCPCS

## 2025-01-27 PROCEDURE — 87040 BLOOD CULTURE FOR BACTERIA: CPT | Mod: 91

## 2025-01-27 PROCEDURE — A9270 NON-COVERED ITEM OR SERVICE: HCPCS | Performed by: NURSE PRACTITIONER

## 2025-01-27 PROCEDURE — 82962 GLUCOSE BLOOD TEST: CPT

## 2025-01-27 PROCEDURE — 700105 HCHG RX REV CODE 258: Performed by: INTERNAL MEDICINE

## 2025-01-27 PROCEDURE — 36415 COLL VENOUS BLD VENIPUNCTURE: CPT

## 2025-01-27 PROCEDURE — 770006 HCHG ROOM/CARE - MED/SURG/GYN SEMI*

## 2025-01-27 PROCEDURE — A9270 NON-COVERED ITEM OR SERVICE: HCPCS | Performed by: STUDENT IN AN ORGANIZED HEALTH CARE EDUCATION/TRAINING PROGRAM

## 2025-01-27 PROCEDURE — 700102 HCHG RX REV CODE 250 W/ 637 OVERRIDE(OP): Performed by: STUDENT IN AN ORGANIZED HEALTH CARE EDUCATION/TRAINING PROGRAM

## 2025-01-27 PROCEDURE — 700102 HCHG RX REV CODE 250 W/ 637 OVERRIDE(OP)

## 2025-01-27 PROCEDURE — 80053 COMPREHEN METABOLIC PANEL: CPT

## 2025-01-27 PROCEDURE — 700111 HCHG RX REV CODE 636 W/ 250 OVERRIDE (IP): Mod: JZ | Performed by: NURSE PRACTITIONER

## 2025-01-27 PROCEDURE — 87641 MR-STAPH DNA AMP PROBE: CPT

## 2025-01-27 PROCEDURE — 700111 HCHG RX REV CODE 636 W/ 250 OVERRIDE (IP): Performed by: INTERNAL MEDICINE

## 2025-01-27 PROCEDURE — 85025 COMPLETE CBC W/AUTO DIFF WBC: CPT

## 2025-01-27 PROCEDURE — 700102 HCHG RX REV CODE 250 W/ 637 OVERRIDE(OP): Performed by: NURSE PRACTITIONER

## 2025-01-27 PROCEDURE — 700105 HCHG RX REV CODE 258: Performed by: NURSE PRACTITIONER

## 2025-01-27 PROCEDURE — 76705 ECHO EXAM OF ABDOMEN: CPT

## 2025-01-27 PROCEDURE — 700101 HCHG RX REV CODE 250: Performed by: NURSE PRACTITIONER

## 2025-01-27 RX ORDER — SODIUM CHLORIDE, SODIUM LACTATE, POTASSIUM CHLORIDE, CALCIUM CHLORIDE 600; 310; 30; 20 MG/100ML; MG/100ML; MG/100ML; MG/100ML
INJECTION, SOLUTION INTRAVENOUS CONTINUOUS
Status: DISCONTINUED | OUTPATIENT
Start: 2025-01-27 | End: 2025-01-29

## 2025-01-27 RX ORDER — SODIUM CHLORIDE, SODIUM LACTATE, POTASSIUM CHLORIDE, AND CALCIUM CHLORIDE .6; .31; .03; .02 G/100ML; G/100ML; G/100ML; G/100ML
500 INJECTION, SOLUTION INTRAVENOUS ONCE
Status: COMPLETED | OUTPATIENT
Start: 2025-01-27 | End: 2025-01-27

## 2025-01-27 RX ORDER — ACETAMINOPHEN 325 MG/1
650 TABLET ORAL EVERY 6 HOURS PRN
Status: DISCONTINUED | OUTPATIENT
Start: 2025-01-27 | End: 2025-02-07 | Stop reason: HOSPADM

## 2025-01-27 RX ORDER — SODIUM CHLORIDE, SODIUM LACTATE, POTASSIUM CHLORIDE, AND CALCIUM CHLORIDE .6; .31; .03; .02 G/100ML; G/100ML; G/100ML; G/100ML
1000 INJECTION, SOLUTION INTRAVENOUS
Status: COMPLETED | OUTPATIENT
Start: 2025-01-27 | End: 2025-01-27

## 2025-01-27 RX ADMIN — GABAPENTIN 200 MG: 100 CAPSULE ORAL at 17:34

## 2025-01-27 RX ADMIN — SODIUM CHLORIDE, POTASSIUM CHLORIDE, SODIUM LACTATE AND CALCIUM CHLORIDE: 600; 310; 30; 20 INJECTION, SOLUTION INTRAVENOUS at 01:21

## 2025-01-27 RX ADMIN — GABAPENTIN 200 MG: 100 CAPSULE ORAL at 05:25

## 2025-01-27 RX ADMIN — LIDOCAINE 3 PATCH: 4 PATCH TOPICAL at 16:21

## 2025-01-27 RX ADMIN — SODIUM CHLORIDE, POTASSIUM CHLORIDE, SODIUM LACTATE AND CALCIUM CHLORIDE: 600; 310; 30; 20 INJECTION, SOLUTION INTRAVENOUS at 23:33

## 2025-01-27 RX ADMIN — VENLAFAXINE HYDROCHLORIDE 75 MG: 75 TABLET ORAL at 05:26

## 2025-01-27 RX ADMIN — ENOXAPARIN SODIUM 40 MG: 100 INJECTION SUBCUTANEOUS at 17:34

## 2025-01-27 RX ADMIN — VANCOMYCIN HYDROCHLORIDE 1750 MG: 5 INJECTION, POWDER, LYOPHILIZED, FOR SOLUTION INTRAVENOUS at 01:36

## 2025-01-27 RX ADMIN — SODIUM CHLORIDE, POTASSIUM CHLORIDE, SODIUM LACTATE AND CALCIUM CHLORIDE 500 ML: 600; 310; 30; 20 INJECTION, SOLUTION INTRAVENOUS at 04:07

## 2025-01-27 RX ADMIN — OMEPRAZOLE 40 MG: 20 CAPSULE, DELAYED RELEASE ORAL at 05:25

## 2025-01-27 RX ADMIN — OMEPRAZOLE 40 MG: 20 CAPSULE, DELAYED RELEASE ORAL at 17:34

## 2025-01-27 RX ADMIN — OXYCODONE 2.5 MG: 5 TABLET ORAL at 07:33

## 2025-01-27 RX ADMIN — PIPERACILLIN AND TAZOBACTAM 4.5 G: 4; .5 INJECTION, POWDER, FOR SOLUTION INTRAVENOUS at 13:31

## 2025-01-27 RX ADMIN — PIPERACILLIN AND TAZOBACTAM 4.5 G: 4; .5 INJECTION, POWDER, FOR SOLUTION INTRAVENOUS at 01:13

## 2025-01-27 RX ADMIN — PIPERACILLIN AND TAZOBACTAM 4.5 G: 4; .5 INJECTION, POWDER, FOR SOLUTION INTRAVENOUS at 21:47

## 2025-01-27 RX ADMIN — GABAPENTIN 200 MG: 100 CAPSULE ORAL at 12:14

## 2025-01-27 RX ADMIN — SODIUM CHLORIDE, POTASSIUM CHLORIDE, SODIUM LACTATE AND CALCIUM CHLORIDE 1000 ML: 600; 310; 30; 20 INJECTION, SOLUTION INTRAVENOUS at 02:30

## 2025-01-27 RX ADMIN — INSULIN LISPRO 1 UNITS: 100 INJECTION, SOLUTION INTRAVENOUS; SUBCUTANEOUS at 17:34

## 2025-01-27 RX ADMIN — INSULIN LISPRO 2 UNITS: 100 INJECTION, SOLUTION INTRAVENOUS; SUBCUTANEOUS at 21:44

## 2025-01-27 RX ADMIN — INSULIN LISPRO 1 UNITS: 100 INJECTION, SOLUTION INTRAVENOUS; SUBCUTANEOUS at 07:34

## 2025-01-27 RX ADMIN — OXYCODONE 2.5 MG: 5 TABLET ORAL at 16:21

## 2025-01-27 RX ADMIN — PIPERACILLIN AND TAZOBACTAM 4.5 G: 4; .5 INJECTION, POWDER, FOR SOLUTION INTRAVENOUS at 03:56

## 2025-01-27 ASSESSMENT — PAIN DESCRIPTION - PAIN TYPE
TYPE: CHRONIC PAIN
TYPE: CHRONIC PAIN
TYPE: ACUTE PAIN;CHRONIC PAIN

## 2025-01-27 ASSESSMENT — ENCOUNTER SYMPTOMS
MYALGIAS: 1
CHILLS: 0
PALPITATIONS: 0
DIZZINESS: 0
BACK PAIN: 1
SPUTUM PRODUCTION: 0
DOUBLE VISION: 0
WEAKNESS: 1
DIAPHORESIS: 0
BLURRED VISION: 0
COUGH: 1
NERVOUS/ANXIOUS: 0
SHORTNESS OF BREATH: 1
DEPRESSION: 0
ABDOMINAL PAIN: 0
NAUSEA: 0
HEADACHES: 0
WHEEZING: 0
FEVER: 0

## 2025-01-27 NOTE — CARE PLAN
The patient is Watcher - Medium risk of patient condition declining or worsening    Shift Goals  Clinical Goals: Blood glucose controlled throughout shift  Patient Goals: Maintain comfort throughout shift  Family Goals: KATHERINE    Progress made toward(s) clinical / shift goals:  Christoph's blood glucose has been controlled during this shift. He did not need additional coverage Lispro as the Lantus and Metformin BID was sufficient.    Patient is not progressing towards the following goals:

## 2025-01-27 NOTE — PROGRESS NOTES
NOC ApRN CROSS COVER NOTE    Notified by bedside RN of low blood pressure, tachycardia and new fever and new hypoxia.  Patient has been on room air prior now requiring 4-5 l to maintain O2 saturation.  He is alert and oriented, has complained of some dizziness with standing, also now complaining of a new cough over the last few days.    Sepsis bundle initiated including LR bolus, stat labs, blood cultures and chest x-ray.      Procalcitonin greatly elevated and x-ray concerning for possible new consolidation, will treat patient for possible hospital-acquired pneumonia as source and initiate Zosyn and Vanco.  Labs also indicate newly elevated LFTs, patient has no complaints of abdominal pain or tenderness at this time, will order right upper quadrant ultrasound for better evaluation. Mild KRISTAL.    If blood pressures do not respond to fluid bolus for lactic acids remain elevated will reevaluate for possible transfer to higher level of care.    Suzanna Liu, MSN, APRN  [facility]    Please note this dictation was created using voice recognition software.  I have made every reasonable attempt to correct obvious errors, but there may be errors of grammar and possibly content that I did not discover before finalizing the note.     Addendum:  Patient persistently hypotensive given an additional 1500 bolus after sepsis bolus. Lactic acid did normalize. Will recheck labs this morning.

## 2025-01-27 NOTE — PROGRESS NOTES
"Pharmacy Vancomycin Kinetics Note for 1/27/2025     59 y.o. male on Vancomycin day # 1     Vancomycin Indication (Trough based Dosing): S. aureus pneumonia (goal trough 15-20)    Provider specified end date: 02/01/25    Active Antibiotics (From admission, onward)      Ordered     Ordering Provider       Mon Jan 27, 2025 12:22 AM    01/27/25 0022  vancomycin (Vancocin) 1,750 mg in  mL IVPB  (vancomycin (VANCOCIN) IV (LD + Maintenance))  ONCE        Note to Pharmacy: Per P&T vanco per pharmacy Protocol    Jaymie Chatterjee D.O.       Mon Jan 27, 2025 12:13 AM    01/27/25 0013  MD Alert...Vancomycin per Pharmacy  (MD Alert...Vancomycin per Pharmacy)  PHARMACY TO DOSE        Question Answer Comment   Indication(s) for vancomycin? Unknown source of infection    Indication(s) for vancomycin? Pneumonia        NATALEE RinconP.R.N.    01/27/25 0013  piperacillin-tazobactam (Zosyn) 4.5 g in  mL IVPB  (piperacillin-tazobactam (ZOSYN) IV (Extended-infusion) PANEL )  ONCE        Placed in \"And\" Linked Group    NATALEE RinconP.R.N.    01/27/25 0013  piperacillin-tazobactam (Zosyn) 4.5 g in  mL IVPB  (piperacillin-tazobactam (ZOSYN) IV (Extended-infusion) PANEL )  EVERY 8 HOURS        Placed in \"And\" Linked Group    NATALEE RinconP.R.N.            Dosing Weight: 74.5 kg (164 lb 3.9 oz)      Admission History: Admitted on 1/9/2025 for Physical debility [R53.81]  Acute respiratory failure with hypoxia (HCC) [J96.01]  Pertinent history: Patient with declining status including fever (100.6F), hypotension, KRISTAL, transaminitis, lactic acidosis, and elevated procal. Source of infection not completely clear suspected PNA. Empiric zosyn and vanco therapy initiated.    Allergies:     Ketamine     Pertinent cultures to date:     Results       Procedure Component Value Units Date/Time    MRSA By PCR ([facility]) [[id number]] Collected: 01/27/25 0040    Order Status: Sent Specimen: Respirate from Nares Updated: 01/27/25 " 0205    Blood Culture [[id number]] Collected: 01/27/25 0107    Order Status: Sent Specimen: Blood from Line Updated: 01/27/25 0158    Blood Culture [[id number]] Collected: 01/27/25 0106    Order Status: Sent Specimen: Blood from Peripheral Updated: 01/27/25 0157    CoV-2, Flu A/B, And RSV by PCR ([employer]) [[id number]] Collected: 01/26/25 2302    Order Status: Completed Specimen: Respirate from Nasal Updated: 01/27/25 0027     Influenza virus A RNA Negative     Influenza virus B, PCR Negative     RSV, PCR Negative     SARS-CoV-2 by PCR NotDetected     Comment: [facility] providers: PLEASE REFER TO DE-ESCALATION AND RETESTING PROTOCOL  on [email or web address]    **The [facility] GeneXpert Xpress SARS-CoV-2 RT-PCR Test has been made  available for use under the Emergency Use Authorization (EUA) only.          SARS-CoV-2 Source NP Swab    Blood Culture [[id number]]     Order Status: Canceled Specimen: Blood from Peripheral     Blood Culture [[id number]]     Order Status: Canceled Specimen: Blood from Line     Urinalysis [[id number]]     Order Status: No result Specimen: Urine, Clean Catch     CULTURE STOOL [[id number]] Collected: 01/26/25 1410    Order Status: Sent Specimen: Stool Updated: 01/26/25 1648    Blood Culture - Draw one from central line and one from peripheral site [[id number]] Collected: 01/09/25 1229    Order Status: Completed Specimen: Blood from Line Updated: 01/14/25 1500     Significant Indicator NEG     Source BLD     Site Peripheral     Culture Result No growth after 5 days of incubation.    Blood Culture - Draw one from central line and one from peripheral site [[id number]] Collected: 01/09/25 1245    Order Status: Completed Specimen: Blood from Peripheral Updated: 01/14/25 1500     Significant Indicator NEG     Source BLD     Site PERIPHERAL     Culture Result No growth after 5 days of incubation.            Labs:     Estimated Creatinine Clearance: 61.8 mL/min (by C-G formula based on SCr of 1.34 mg/dL).  Recent Labs  "    25  223   WBC 6.4   NEUTSPOLYS 95.60*     Recent Labs     25  0152 25  0314 25   BUN 27* 26* 40*   CREATININE 0.57 0.72 1.34   ALBUMIN  --   --  3.2       Intake/Output Summary (Last 24 hours) at 2025 0030  Last data filed at 2025 1800  Gross per 24 hour   Intake 960 ml   Output 400 ml   Net 560 ml      BP (!) 83/40   Pulse (!) 103   Temp 37.1 °C (98.8 °F) (Temporal)   Resp 16   Ht 1.829 m (6' 0.01\")   Wt 74.5 kg (164 lb 3.9 oz)   SpO2 93%  Temp (24hrs), Av.8 °C (98.3 °F), Min:36.3 °C (97.3 °F), Max:38.1 °C (100.6 °F)      List concerns for Vancomycin clearance:     BUN/Scr ratio greater than 20:1;Hypermetabolic State (SIRS);Abnormal LFTs;Nephrotoxic drugs    A/P:     -  Vancomycin dose: 1750mg pulse dose    -  Next vancomycin level(s):    - Vr @ 1300     -  Comments: Concerns for accumulation listed above. Given KRISTAL, with SCr of 1.34mg/dL baseline ~ 0.5 mg/dL plan to pulse dose and obtain random 12 hour level to assess clearance. MRSA nares swab ordered. Pharmacy will continue to follow and adjust therapy as clinically appropriate.    Robert Mazariegos, PharmD    "

## 2025-01-27 NOTE — PROGRESS NOTES
Hospital Medicine Daily Progress Note    Date of Service  1/27/2025    Chief Complaint  Christoph Taylor is a 59 y.o. male admitted 1/9/2025 with weakness.    Hospital Course  Christoph Taylor is a 59-year-old male with prior suicidal ideation, poorly controlled diabetes mellitus type 2, housing insecurity and severe protein calorie malnutrition.  Admitted 1/9 for acute on chronic back pain.    Patient states that he is unable to take care of himself and has a hard time administering insulin, buying food for himself, taking his blood sugars. Additionally says that he has worsening neuropathy of the feet and hands. His pain is worse than ever. He has no additional help here.    In the ER he was found to be hyperglycemic and quite weak.  UA was concerning for possible UTI.  Patient was given 1 dose of IV cefazolin.  However, he denied urinary symptoms and antibiotics were not continued.     In terms of patient's weakness-MRI thoracic and lumbar notable for old compression fracture at T12 and multiple disc bulging without quinton central canal stenosis or cord impingement.  Neurosurgery was consulted.  Dr. Johnson recommending non operative management and outpatient follow-up for possible epidural injection.  PT OT recommending postacute rehabilitation.  However, no accepting facilities were identified.  Patient intermittently refusing to work with PT here.    Home health has been arranged prior to discharge.    Interval Problem Update  1/14: Vitals stable overnight.  -129.  Patient is medically clear for discharge pending placement.    1/15: Vitals remained stable.   through 156.  Glucose ranging 104 through 246.  Case management is working hard to obtain patient's insurance card to evaluate benefits for postacute rehabilitation.  Patient is medically cleared to discharge once insurance barrier has been resolved.    1/16: Vitals stable.  Glucose this a.m. 176.  Range 2 29 through 3 05.  Increase Lantus to  25 units in the p.m.  Multiple long discussions with patient, case management and nursing staff regarding discharge plan.  Patient feels unsafe to discharge to his home.  He has 5 steps.  He does not have a ramp.  He is largely wheelchair-bound due to chronic pain.    1/17: Vital stable overnight.  -130.  Glucose improved with increase Lantus dosing.  A.m. blood sugar 104.  Patient is now being followed by complex discharge committee.    1/18: Vitals notable for SBP .  Glucose ranging 125 through 196.  Patient required 1 unit sliding scale insulin yesterday.  Patient remains medically clear for discharge though a safe discharge plan has not been arranged yet.  Encouraged ambulation at least 3 times daily.    1/19: Vital stable overnight.   through 126.  Glucose range .  Required 2 units SSI over last 24 hours.    1/20: Vitals stable overnight.  Patient endorsing depression and refusal to participate in medical therapies.  I have placed a referral for inpatient psychology/behavioral health.  Increase venlafaxine to 75 mg.  Continue to encourage participation in ambulation and patient rehabilitation.    1/21 Patient able to self transfer to chair, reports ongoing low back pain, pain level of 7/10  States that he is now wheelchair-bound, pending placement,  to assist  Patient with hypoglycemic episode this a.m. was given juice, unable to self administer insulin at home, will need placement assistance    1/22 no new events, sleeping arousable, AFO delivered for PT reeval    1/23 reports low back pain, able to transfer to chair, PT blanca    1/24 ongoing lbp, min activity, Has not worked with PT with AFO, to reconsult  Improving blood sugar    1/25 patient with hypoglycemia this morning, blood sugar of 69, arousable improved after dextrose administration, Lantus decreased patient did work with PT with AFO, encourage activity    1/26 improved blood sugar, patient sitting up in chair, no  new complaints  Patient did have increased activity with AFO, continue to encourage activity    1/27 patient was noted to be hypotensive with increasing respiratory failure requiring 45 L oxygen supplementation, chest x-ray with infiltrates  Patient was started on septic protocol, given 4 L of IV fluid hydration as well as initiated on IV antibiotics, UA is negative  Patient does not recall events of last evening, patient feels mild low back pain, patient does report dry cough, still on 4 to 5 L, taper and oxygen needs  Encourage activity    I have discussed this patient's plan of care and discharge plan at IDT rounds today with Case Management, Nursing, Nursing leadership, and other members of the IDT team.    Consultants/Specialty  None at this time     Code Status  Full Code    Disposition  The patient is not medically cleared for discharge to home or a post-acute facility.      I have placed the appropriate orders for post-discharge needs.    Review of Systems  Review of Systems   Constitutional:  Positive for malaise/fatigue. Negative for chills, diaphoresis and fever.   Eyes:  Negative for blurred vision and double vision.   Respiratory:  Positive for cough and shortness of breath. Negative for sputum production and wheezing.    Cardiovascular:  Negative for palpitations.   Gastrointestinal:  Negative for abdominal pain and nausea.   Genitourinary:  Negative for dysuria.   Musculoskeletal:  Positive for back pain and myalgias.   Neurological:  Positive for weakness. Negative for dizziness and headaches.   Psychiatric/Behavioral:  Negative for depression. The patient is not nervous/anxious.         Physical Exam  Temp:  [36.2 °C (97.1 °F)-38.1 °C (100.6 °F)] 36.2 °C (97.1 °F)  Pulse:  [] 92  Resp:  [16-18] 18  BP: ()/(40-91) 99/64  SpO2:  [80 %-98 %] 94 %    Physical Exam  Vitals and nursing note reviewed.   Constitutional:       General: He is not in acute distress.     Appearance: Normal  appearance. He is ill-appearing. He is not toxic-appearing or diaphoretic.   HENT:      Mouth/Throat:      Mouth: Mucous membranes are moist.   Eyes:      Extraocular Movements: Extraocular movements intact.      Pupils: Pupils are equal, round, and reactive to light.   Cardiovascular:      Rate and Rhythm: Regular rhythm.   Pulmonary:      Effort: Pulmonary effort is normal. No respiratory distress.      Breath sounds: No wheezing, rhonchi or rales.      Comments: Decreased breath sounds bilaterally  Abdominal:      General: There is no distension.      Palpations: There is no mass.      Tenderness: There is no abdominal tenderness.   Musculoskeletal:         General: No swelling or tenderness.   Skin:     General: Skin is warm.      Coloration: Skin is pale. Skin is not jaundiced.      Findings: No erythema.   Neurological:      Mental Status: He is alert and oriented to person, place, and time. Mental status is at baseline.      Cranial Nerves: No cranial nerve deficit.      Sensory: No sensory deficit.      Motor: Weakness present.      Coordination: Coordination normal.   Psychiatric:         Mood and Affect: Mood normal.         Behavior: Behavior normal.         Fluids    Intake/Output Summary (Last 24 hours) at 1/27/2025 1043  Last data filed at 1/27/2025 0733  Gross per 24 hour   Intake 1215 ml   Output 1311 ml   Net -96 ml        Laboratory  Recent Labs     01/26/25 2239 01/27/25 0222   WBC 6.4 8.5   RBC 3.55* 3.18*   HEMOGLOBIN 9.2* 8.3*   HEMATOCRIT 29.2* 26.1*   MCV 82.3 82.1   MCH 25.9* 26.1*   MCHC 31.5* 31.8*   RDW 41.7 42.2   PLATELETCT 179 179   MPV 8.9* 9.1     Recent Labs     01/26/25  0314 01/26/25 2239 01/27/25 0222   SODIUM 143 137 134*   POTASSIUM 4.3 4.7 4.8   CHLORIDE 104 98 98   CO2 31 27 27   GLUCOSE 80 207* 215*   BUN 26* 40* 40*   CREATININE 0.72 1.34 1.34   CALCIUM 8.6 8.3* 8.0*     Recent Labs     01/26/25 2239   INR 1.04               Imaging  US-RUQ   Final Result      1.   Distended gallbladder with gallbladder sludge and a small calcified gallstone. No discrete sonographic evidence of acute cholecystitis.   2.  Mildly dilated main portal vein which could indicate portal hypertension      DX-CHEST-PORTABLE (1 VIEW)   Final Result      Left basilar atelectasis. Underlying infection is possible.      MR-LUMBAR SPINE-W/O   Final Result      1.  T12 chronic compression fracture with anterior wedge deformity.   2.  Postoperative changes with interval L4-5 laminectomy since prior MRI study. There is now laminectomy at L4-5-S1 and there is now posterior fusion with transpedicular screw fixation at L4-L5. Relief of previously seen severe central stenosis at L4-5.   3.  Multilevel degenerative changes as detailed for each level above in the body of report.      MR-THORACIC SPINE-W/O   Final Result      1.  T12 old compression fracture with anterior wedge deformity. Associated mild lower thoracic kyphosis.   2.  T2 old superior endplate compression fracture.   3.  Multilevel disc bulges T3-T4, T7-T8, T8-T9, T11-12, T12-L1. Variable mass effect on the ventral thecal sac without quinton central stenosis or cord impingement at any thoracic level.   4.  No myelopathic cord signal at any thoracic level.      DX-CHEST-PORTABLE (1 VIEW)   Final Result      No acute cardiopulmonary disease evident.           Assessment/Plan  * Compression fracture of body of thoracic vertebra (HCC)- (present on admission)  Assessment & Plan  MRI T-spine with T12 old compression fracture, T2 old superior endplate compression fracture, multiple level disc bulges T3-4 T7-T8 T8-T9, T11-T12, variable mass effect with out  central stenosis or cord impingement, MRI L-spine with T12 compression fracture, previous L4-L5 laminectomy, multilevel degenerative changes.  - Neurosurgery has been consulted  - Recommending non operative management at this time  - Continue to follow-up outpatient for possible epidural  - Continue  gabapentin, lidocaine and oxycodone as needed for pain management  - PT OT recommending postacute rehabilitation  - I personally reviewed MRI images with patient at bedside 1/16 1/21 placement pending, f/u with CM  - ongoing LBP, schedule flexeril    1/22 PT reeval with AFO now available  Pain control, encourage activity, placement pending    1/23 PT  with AFO, encourage activity, pending placement, CM to assist, ? GH  1/24 PT to work with patient with AFO, consult replaced  Encourage activity    1/25 patient worked with PT with AFO, encourage activity    1/26 cont activity, placement pending    1/27 difficult discharge,  assisting  Considering transfer to another state to be closer to family, patient can no longer live alone, cannot self administer insulin or take care of himself  Encourage activity  Placement pending    Sepsis (HCC)  Assessment & Plan  This is Sepsis Not present on admission  SIRS criteria identified on my evaluation include: Tachycardia, with heart rate greater than 90 BPM and Tachypnea, with respirations greater than 20 per minute  Clinical indicators of end organ dysfunction include Hypotension with systolic blood pressure less than 90 or MAP less than 65, Lactic Acid greater than 2, and Acute Respiratory Failure - (mechanical ventilation or BiPap or PaO2/FiO2 ratio < 300)  Source is pneumonia/lungs  Sepsis protocol initiated  Crystalloid Fluid Administration: Fluid resuscitation ordered per standard protocol - 30 mL/kg per current or ideal body weight  IV antibiotics as appropriate for source of sepsis  Reassessment: I have reassessed the patient's hemodynamic status    1/27 septic protocol initiated, received 4 L of fluid hydration, improved blood pressure, lactic acidosis resolved, will follow-up blood a.m. lactic acid level, repeat is at 2  Borderline hypotension, continue fluid hydration  Patient now on IV antibiotics, follow-up cultures  Taper oxygen as tolerated, chest  x-ray concerning for infiltrates    Moderate episode of recurrent major depressive disorder (HCC)  Assessment & Plan  Patient endorsing depression.  Unwillingness to participate in his rehabilitation.  Endorses feelings of helplessness and hopelessness.  Per nursing staff he has expressed SI without a plan.  He does not endorse SI to me; on 2 separate occasions.  - Continue to encourage patient to participate in his rehabilitation.  - Increase venlafaxine to 75 mg  - Inpatient behavioral health referral has been placed.  - I do not see an indication to initiate a legal hold at this point.  Patient denies SI and SI plan to me.  Per nursing staff patient does become agitated when his snacks are withheld.  We will continue to monitor the situation closely.    Bacteriuria- (present on admission)  Assessment & Plan  No abx's for now given lack of symptoms    Physical debility- (present on admission)  Assessment & Plan  MRI with compression T2 and T12 fracture, multilevel bulging disc through thoracic spine.  No cord impingement.  Neurosurgery recommended nonoperative management  Follow-up for epidural injection as outpatient  Placement is pending    1/21 Patient reports ambulatory status prior to this admission, to follow-up with orthopedic surgery for AFO evaluation  , Will reconsult PT OT for AFO as needed, will consult orthopedic surgery as needed  -Patient reports low back pain, has Zanaflex as needed, will add Flexeril 3 times daily  Continue pain control  Encourage activity    Acute hypoxic respiratory failure (HCC)- (present on admission)  Assessment & Plan  Resolved  Now on room air, continue to monitor oxygen requirement    Elevated troponin- (present on admission)  Assessment & Plan  Chronically elevated. Baseline  - no further workup    Uncontrolled type 2 diabetes mellitus with hyperglycemia (HCC)- (present on admission)  Assessment & Plan  A1C 9.8  - Continue Lantus to 25 units in the evening  - Continue  sliding scale insulin; has required 2 units SSI in last 24 hours  - Patient states he is unable to drop insulin at home due to hand coordination issues.  We will try to minimize sliding scale insulin and discharged with only Lantus pens.  - Monitor BG trend.   - Accu-Cheks before meals and at bedtime.   - Goal to keep BG between 140-180 per 2019 ADA guidelines     1/21 hypoglycemia this am, decrease Lantus to 18 units, cont ssi  - f/u am labs    1/22 bs improved, on lantus 18 units, cont and monitor  ? GH    1/23 improvng bs, increase Lantus 20 u, add metformin, monitor    1/24 improving, cont lantus, f/u am bmp, consider increasing metformin    1/25 hypoglycemia this a.m., decreased metformin and Lantus, monitor closely    1/26 improved bs, now on lantus 10u, monitor, encourage activity    1/27 improved control, continue Lantus    CKD (chronic kidney disease)- (present on admission)  Assessment & Plan  Creatinine stable at 0.57       VTE prophylaxis:   SCDs/TEDs      I have performed a physical exam and reviewed and updated ROS and Plan today (1/27/2025). In review of yesterday's note (1/26/2025), there are no changes except as documented above.

## 2025-01-27 NOTE — ASSESSMENT & PLAN NOTE
This is Sepsis Not present on admission  SIRS criteria identified on my evaluation include: Tachycardia, with heart rate greater than 90 BPM and Tachypnea, with respirations greater than 20 per minute  Clinical indicators of end organ dysfunction include Hypotension with systolic blood pressure less than 90 or MAP less than 65, Lactic Acid greater than 2, and Acute Respiratory Failure - (mechanical ventilation or BiPap or PaO2/FiO2 ratio < 300)  Source is pneumonia/lungs  Sepsis protocol initiated  Crystalloid Fluid Administration: Fluid resuscitation ordered per standard protocol - 30 mL/kg per current or ideal body weight  IV antibiotics as appropriate for source of sepsis  Reassessment: I have reassessed the patient's hemodynamic status    1/27 septic protocol initiated, received 4 L of fluid hydration, improved blood pressure, lactic acidosis resolved, will follow-up blood a.m. lactic acid level, repeat is at 2  Borderline hypotension, continue fluid hydration  Patient now on IV antibiotics, follow-up cultures  Taper oxygen as tolerated, chest x-ray concerning for infiltrates

## 2025-01-27 NOTE — CARE PLAN
The patient is Watcher - Medium risk of patient condition declining or worsening    Shift Goals  Clinical Goals: Monitor BP and BG, IV fluids, IV ABX  Patient Goals: PAin control, sleep comfortably  Family Goals: KATHERINE    Progress made toward(s) clinical / shift goals:  BP and BG monitored this shift. IV fluids and ABX administered per MAR. Patients pain managed with PRN medications per MAR. Patient remained free from falls throughout shift. All fall precautions in place. Bed locked and in lowest position, call light remained in reach. POC discussed with patient, all needs met this shift. Safety education provided.     Problem: Safety  Goal: Will remain free from injury  Outcome: Progressing  Goal: Will remain free from falls  Outcome: Progressing     Problem: Infection  Goal: Will remain free from infection  Outcome: Progressing     Problem: Skin Integrity  Goal: Risk for impaired skin integrity will decrease  Outcome: Progressing       Patient is not progressing towards the following goals:

## 2025-01-27 NOTE — DISCHARGE PLANNING
Case Management Discharge Planning    Admission Date: 1/9/2025  GMLOS: 5.2  ALOS: 17    6-Clicks ADL Score: 18  6-Clicks Mobility Score: 20      Anticipated Discharge Dispo: Discharge Disposition: Discharged to home/self care (01)    This RN CM completed chart review and patient is not medically cleared for discharge at this time, RR called on 1/26 for decreased BP and tachycardia, work up for sepsis completed. Patient is anticipated to need  placement in [place] thru [facility] for room and board.     3503 This RN CM called [facility] and [facility] contact number for pricing and availability is [phone] Address: [address] [place] [phone] cost starting at $3,500 to $4,473/month. This RN CM spoke with Kae about [facility] this  works mostly thru self pay and not thru [facility] services.     This RN CM received contact for [phone] for [place] with Aging and disability [facility].

## 2025-01-27 NOTE — PROGRESS NOTES
Pt on , shows his oxygen saturation decreases to 83% while sleeping. 0.5L of oxygen with simple mask increases his saturation to 94%.

## 2025-01-27 NOTE — CARE PLAN
The patient is Watcher - Medium risk of patient condition declining or worsening    Shift Goals  Clinical Goals: Decrease s/s of infection throughout shift  Patient Goals: Manage comfort throughout shift  Family Goals: KATHERINE    Progress made toward(s) clinical / shift goals:  Christoph's blood pressures have improved. He remains afebrile and is tolerating his antibiotics. He has not had any bowel movements during this shift.     Patient is not progressing towards the following goals:

## 2025-01-27 NOTE — PROGRESS NOTES
Notified provider as pt has had four watery mucous bowel movements during shift. No recent abx, slight abdominal pain. Provider acknowledged, ordered stool culture for now.

## 2025-01-28 LAB
ALBUMIN SERPL BCP-MCNC: 2.9 G/DL (ref 3.2–4.9)
ALBUMIN/GLOB SERPL: 1.1 G/DL
ALP SERPL-CCNC: 158 U/L (ref 30–99)
ALT SERPL-CCNC: 300 U/L (ref 2–50)
ANION GAP SERPL CALC-SCNC: 7 MMOL/L (ref 7–16)
AST SERPL-CCNC: 180 U/L (ref 12–45)
BASOPHILS # BLD AUTO: 0.2 % (ref 0–1.8)
BASOPHILS # BLD: 0.01 K/UL (ref 0–0.12)
BILIRUB SERPL-MCNC: 0.2 MG/DL (ref 0.1–1.5)
BUN SERPL-MCNC: 21 MG/DL (ref 8–22)
CALCIUM ALBUM COR SERPL-MCNC: 9.2 MG/DL (ref 8.5–10.5)
CALCIUM SERPL-MCNC: 8.3 MG/DL (ref 8.5–10.5)
CHLORIDE SERPL-SCNC: 102 MMOL/L (ref 96–112)
CO2 SERPL-SCNC: 30 MMOL/L (ref 20–33)
CREAT SERPL-MCNC: 0.74 MG/DL (ref 0.5–1.4)
EOSINOPHIL # BLD AUTO: 0.19 K/UL (ref 0–0.51)
EOSINOPHIL NFR BLD: 3.4 % (ref 0–6.9)
ERYTHROCYTE [DISTWIDTH] IN BLOOD BY AUTOMATED COUNT: 40.8 FL (ref 35.9–50)
GFR SERPLBLD CREATININE-BSD FMLA CKD-EPI: 103 ML/MIN/1.73 M 2
GLOBULIN SER CALC-MCNC: 2.6 G/DL (ref 1.9–3.5)
GLUCOSE BLD STRIP.AUTO-MCNC: 168 MG/DL (ref 65–99)
GLUCOSE BLD STRIP.AUTO-MCNC: 174 MG/DL (ref 65–99)
GLUCOSE BLD STRIP.AUTO-MCNC: 177 MG/DL (ref 65–99)
GLUCOSE BLD STRIP.AUTO-MCNC: 249 MG/DL (ref 65–99)
GLUCOSE SERPL-MCNC: 169 MG/DL (ref 65–99)
HCT VFR BLD AUTO: 27.4 % (ref 42–52)
HGB BLD-MCNC: 8.7 G/DL (ref 14–18)
IMM GRANULOCYTES # BLD AUTO: 0.03 K/UL (ref 0–0.11)
IMM GRANULOCYTES NFR BLD AUTO: 0.5 % (ref 0–0.9)
LACTATE SERPL-SCNC: 0.7 MMOL/L (ref 0.5–2)
LYMPHOCYTES # BLD AUTO: 0.68 K/UL (ref 1–4.8)
LYMPHOCYTES NFR BLD: 12.1 % (ref 22–41)
MCH RBC QN AUTO: 25.7 PG (ref 27–33)
MCHC RBC AUTO-ENTMCNC: 31.8 G/DL (ref 32.3–36.5)
MCV RBC AUTO: 80.8 FL (ref 81.4–97.8)
MONOCYTES # BLD AUTO: 0.37 K/UL (ref 0–0.85)
MONOCYTES NFR BLD AUTO: 6.6 % (ref 0–13.4)
NEUTROPHILS # BLD AUTO: 4.35 K/UL (ref 1.82–7.42)
NEUTROPHILS NFR BLD: 77.2 % (ref 44–72)
NRBC # BLD AUTO: 0 K/UL
NRBC BLD-RTO: 0 /100 WBC (ref 0–0.2)
PLATELET # BLD AUTO: 144 K/UL (ref 164–446)
PMV BLD AUTO: 9.2 FL (ref 9–12.9)
POTASSIUM SERPL-SCNC: 4.2 MMOL/L (ref 3.6–5.5)
PROT SERPL-MCNC: 5.5 G/DL (ref 6–8.2)
RBC # BLD AUTO: 3.39 M/UL (ref 4.7–6.1)
SODIUM SERPL-SCNC: 139 MMOL/L (ref 135–145)
WBC # BLD AUTO: 5.6 K/UL (ref 4.8–10.8)

## 2025-01-28 PROCEDURE — 700111 HCHG RX REV CODE 636 W/ 250 OVERRIDE (IP): Mod: JZ | Performed by: INTERNAL MEDICINE

## 2025-01-28 PROCEDURE — 85025 COMPLETE CBC W/AUTO DIFF WBC: CPT

## 2025-01-28 PROCEDURE — 90832 PSYTX W PT 30 MINUTES: CPT | Performed by: SOCIAL WORKER

## 2025-01-28 PROCEDURE — 700102 HCHG RX REV CODE 250 W/ 637 OVERRIDE(OP): Performed by: NURSE PRACTITIONER

## 2025-01-28 PROCEDURE — 80053 COMPREHEN METABOLIC PANEL: CPT

## 2025-01-28 PROCEDURE — A9270 NON-COVERED ITEM OR SERVICE: HCPCS

## 2025-01-28 PROCEDURE — 700105 HCHG RX REV CODE 258: Performed by: INTERNAL MEDICINE

## 2025-01-28 PROCEDURE — 51798 US URINE CAPACITY MEASURE: CPT

## 2025-01-28 PROCEDURE — 700105 HCHG RX REV CODE 258: Performed by: NURSE PRACTITIONER

## 2025-01-28 PROCEDURE — A9270 NON-COVERED ITEM OR SERVICE: HCPCS | Performed by: INTERNAL MEDICINE

## 2025-01-28 PROCEDURE — A9270 NON-COVERED ITEM OR SERVICE: HCPCS | Performed by: NURSE PRACTITIONER

## 2025-01-28 PROCEDURE — 700111 HCHG RX REV CODE 636 W/ 250 OVERRIDE (IP): Mod: JZ | Performed by: NURSE PRACTITIONER

## 2025-01-28 PROCEDURE — 700102 HCHG RX REV CODE 250 W/ 637 OVERRIDE(OP): Performed by: STUDENT IN AN ORGANIZED HEALTH CARE EDUCATION/TRAINING PROGRAM

## 2025-01-28 PROCEDURE — 700101 HCHG RX REV CODE 250: Performed by: NURSE PRACTITIONER

## 2025-01-28 PROCEDURE — 770006 HCHG ROOM/CARE - MED/SURG/GYN SEMI*

## 2025-01-28 PROCEDURE — 700102 HCHG RX REV CODE 250 W/ 637 OVERRIDE(OP): Performed by: INTERNAL MEDICINE

## 2025-01-28 PROCEDURE — 83605 ASSAY OF LACTIC ACID: CPT

## 2025-01-28 PROCEDURE — 99233 SBSQ HOSP IP/OBS HIGH 50: CPT | Performed by: STUDENT IN AN ORGANIZED HEALTH CARE EDUCATION/TRAINING PROGRAM

## 2025-01-28 PROCEDURE — 97535 SELF CARE MNGMENT TRAINING: CPT

## 2025-01-28 PROCEDURE — 36415 COLL VENOUS BLD VENIPUNCTURE: CPT

## 2025-01-28 PROCEDURE — 82962 GLUCOSE BLOOD TEST: CPT | Mod: 91

## 2025-01-28 PROCEDURE — A9270 NON-COVERED ITEM OR SERVICE: HCPCS | Performed by: STUDENT IN AN ORGANIZED HEALTH CARE EDUCATION/TRAINING PROGRAM

## 2025-01-28 PROCEDURE — 700102 HCHG RX REV CODE 250 W/ 637 OVERRIDE(OP)

## 2025-01-28 RX ADMIN — PIPERACILLIN AND TAZOBACTAM 4.5 G: 4; .5 INJECTION, POWDER, FOR SOLUTION INTRAVENOUS at 23:06

## 2025-01-28 RX ADMIN — INSULIN LISPRO 1 UNITS: 100 INJECTION, SOLUTION INTRAVENOUS; SUBCUTANEOUS at 23:07

## 2025-01-28 RX ADMIN — OXYCODONE 5 MG: 5 TABLET ORAL at 19:46

## 2025-01-28 RX ADMIN — OMEPRAZOLE 40 MG: 20 CAPSULE, DELAYED RELEASE ORAL at 05:48

## 2025-01-28 RX ADMIN — GABAPENTIN 200 MG: 100 CAPSULE ORAL at 17:20

## 2025-01-28 RX ADMIN — INSULIN LISPRO 2 UNITS: 100 INJECTION, SOLUTION INTRAVENOUS; SUBCUTANEOUS at 17:23

## 2025-01-28 RX ADMIN — INSULIN LISPRO 1 UNITS: 100 INJECTION, SOLUTION INTRAVENOUS; SUBCUTANEOUS at 07:59

## 2025-01-28 RX ADMIN — PIPERACILLIN AND TAZOBACTAM 4.5 G: 4; .5 INJECTION, POWDER, FOR SOLUTION INTRAVENOUS at 05:47

## 2025-01-28 RX ADMIN — ENOXAPARIN SODIUM 40 MG: 100 INJECTION SUBCUTANEOUS at 17:22

## 2025-01-28 RX ADMIN — GABAPENTIN 200 MG: 100 CAPSULE ORAL at 12:38

## 2025-01-28 RX ADMIN — INSULIN LISPRO 1 UNITS: 100 INJECTION, SOLUTION INTRAVENOUS; SUBCUTANEOUS at 12:55

## 2025-01-28 RX ADMIN — OMEPRAZOLE 40 MG: 20 CAPSULE, DELAYED RELEASE ORAL at 17:21

## 2025-01-28 RX ADMIN — GABAPENTIN 200 MG: 100 CAPSULE ORAL at 05:47

## 2025-01-28 RX ADMIN — OXYCODONE 5 MG: 5 TABLET ORAL at 05:52

## 2025-01-28 RX ADMIN — LIDOCAINE 3 PATCH: 4 PATCH TOPICAL at 17:21

## 2025-01-28 RX ADMIN — VENLAFAXINE HYDROCHLORIDE 75 MG: 75 TABLET ORAL at 05:48

## 2025-01-28 RX ADMIN — OXYCODONE 5 MG: 5 TABLET ORAL at 12:37

## 2025-01-28 RX ADMIN — SODIUM CHLORIDE, POTASSIUM CHLORIDE, SODIUM LACTATE AND CALCIUM CHLORIDE: 600; 310; 30; 20 INJECTION, SOLUTION INTRAVENOUS at 09:28

## 2025-01-28 RX ADMIN — PIPERACILLIN AND TAZOBACTAM 4.5 G: 4; .5 INJECTION, POWDER, FOR SOLUTION INTRAVENOUS at 12:52

## 2025-01-28 ASSESSMENT — PAIN DESCRIPTION - PAIN TYPE
TYPE: ACUTE PAIN;CHRONIC PAIN
TYPE: ACUTE PAIN;CHRONIC PAIN
TYPE: ACUTE PAIN
TYPE: ACUTE PAIN;CHRONIC PAIN
TYPE: ACUTE PAIN

## 2025-01-28 NOTE — THERAPY
Occupational Therapy Contact Note    Patient Name: Christoph Taylor  Age:  59 y.o., Sex:  male  Medical Record #: [id number]  Today's Date: 1/28/2025 01/28/25 7033   Interdisciplinary Plan of Care Collaboration   IDT Collaboration with  Nursing   Patient Position at End of Therapy In Bed;Bed Alarm On;Call Light within Reach;Tray Table within Reach;Phone within Reach   Collaboration Comments Attempted to see patient x2, once in the AM and then in PM. Pt initially requesting to rest then in PM wanting to wait until tomorrow due to recent catheter being pulled and generally not feeling well. Pt educated on benefits for mobility and ADLs still requested to wait until tomorrow. Will hold and follow up as able/appropriate.

## 2025-01-28 NOTE — CONSULTS
"[facility]    INPATIENT ASSESSMENT    Name: Christoph Taylor  MRN: [id number]  : 1965  Age: 59 y.o.  Date of assessment: 2025  PCP: YESY Tariq.  Persons in attendance: Patient    HPI: Per Medical Record: \"Christoph Taylor is a 59 y.o. male with history of chronic upper abdominal pain uncontrolled diabetes with DKA's, CKD 3, kidney stones, horseshoe kidney, esophagitis, who presented 2024 with ongoing upper abdominal pain on and off associated with nausea and occasional vomiting.  He presented with the same complaints yesterday and was evaluated to be CT of the abdomen with contrast that showed possible esophagitis, distended urinary bladder and 5 mm nonobstructive kidney stone.  He reported suicidal ideations yesterday, but denies any ideations or plans today  During observation in ER he transiently desaturated to 83% and was placed on 2 L nasal cannula.  Blood work showed WBC 14.2.  COVID-19/influenza negative.  Chest x-ray: Negative.  CT of pulmonary artery was ordered  Blood sugar is in the 400s.  Patient stated he skipped 3 days of Lantus.  Bladder scan revealed 1 L of urine and I ordered Crowley catheter  Treatment in the ER included ceftriaxone and azithromycin for possible early pneumonia, 1 L of Ringer lactate and Tylenol 1 g.  Of note, patient denies cough or shortness of breath.\"  Behavioral Health continues to follow to provide support and psychotherapy.    CHIEF COMPLAINT/PRESENTING ISSUE (as stated by Patient): Christoph Taylor 59 year old male seen sleeping but able to be aroused. Patient is alert, oriented, with clear and coherent speech, showing no signs of a thought disorder. However, patient had poor eye contact during the session. Patient spoke with their eyes closed and appeared frustrated during the session. Patient showed little interest in engaging in psychotherapy. Patient complained about poor sleep the previous night due to back pain and " "expressed feelings of neglect, stating, \"nobody cares.\"   Patient continued to voice frustration about the \"beeping\" sound, feeling it was ignored by staff.  Patient was resistant during the session, stating \"nobody gives a shit.\" Clinician assured patient the staff was there to support him.     Chief Complaint   Patient presents with    Weakness     Increasing weakness last week, decreased dexterity with upper extremities baseline, thoracic back pain and worsening last x4 days, prior back surgeries, Rx Gabapentin    High Blood Sugar     With , has not been able to give self insulin last x4 days, at ER POC glucose 382     Psychotherapy Session Summary: Clinician attempted Cognitive Behavioral Therapy to address patients cognitive distortions by exploring and resolving patient's ambivalence about their thoughts and behaviors. Patient was not engaged during the session. Patient remained silent, nodding his head \"no\". Clinician attempted to help patient improve their mood by exploring hobbies and activities they might enjoy. Patient stated he no longer enjoys his hobbies or music stating, \"not anymore.\" Clinician encouraged patient to continue to reframe his negative thought patterns. Patient appears to be consumed with negative perceptions that interfere with his motivation. Patient agreed to work on challenging their negative thought patterns and replacing them with more positive thinking.     CURRENT LIVING SITUATION/SOCIAL SUPPORT: Patient is retired and lives alone. Patient has three sons who reside in [place]. Patient reports no support system and no outside interests.     BEHAVIORAL HEALTH TREATMENT HISTORY  Does patient/parent report a history of prior behavioral health treatment for patient?   No: report of outpatient treatment    SAFETY ASSESSMENT - SELF  Does patient acknowledge current or past symptoms of dangerousness to self? no  Does parent/significant other report patient has current or past " "symptoms of dangerousness to self? N\A  Does presenting problem suggest symptoms of dangerousness to self? No    SAFETY ASSESSMENT - OTHERS  Does patient acknowledge current or past symptoms of aggressive behavior or risk to others? no  Does parent/significant other report patient has current or past symptoms of aggressive behavior or risk to others?  no  Does presenting problem suggest symptoms of dangerousness to others? No    Crisis Safety Plan completed and copy given to patient? N\A    ABUSE/NEGLECT SCREENING  Does patient report feeling “unsafe” in his/her home, or afraid of anyone?  no  Does patient report any history of physical, sexual, or emotional abuse?  no  Does parent or significant other report any of the above? N\A  Is there evidence of neglect by self?  no  Is there evidence of neglect by a caregiver? no  Does the patient/parent report any history of CPS/APS/police involvement related to suspected abuse/neglect or domestic violence? no  Based on the information provided during the current assessment, is a mandated report of suspected abuse/neglect being made?  No    SUBSTANCE USE SCREENING  Yes:  Merlin all substances used in the past 30 days:      Last Use Amount   []   Alcohol     []   Marijuana     []   Heroin     []   Prescription Opioids  (used without prescription, for    recreation, or in excess of prescribed amount)     []   Other Prescription  (used without prescription, for    recreation, or in excess of prescribed amount)     []   Cocaine      []   Methamphetamine     []   \"\" drugs (ectasy, MDMA)     []   Other substances        UDS results: Not assessed  Breathalyzer results: Not assessed    What consequences does the patient associate with any of the above substance use and or addictive behaviors? Health problems:     Risk factors for detox (check all that apply):  []  Seizures   []  Diaphoretic (sweating)   []  Tremors   []  Hallucinations   []  Increased blood pressure   []  " Decreased blood pressure   []  Other   []  None      [] Patient education on risk factors for detoxification and instructed to return to ER as needed.      MENTAL STATUS              Participation: Resistant  Grooming: Casual  Orientation: Alert  Behavior: Tense  Eye contact: Poor  Mood: Irritable  Affect: Flat  Thought process: Circumstantial  Thought content: Within normal limits  Speech: Soft  Perception: Within normal limits  Memory:  Recent:  Adequate  Insight: Adequate  Judgment:  Adequate  Other:    Collateral information:   Source:   Significant other present in person:    Significant other by telephone   [facility]   [facility] Record: Reviewed   Other:      Unable to complete full assessment due to:   Acute intoxication   Patient declined to participate/engage   Patient verbally unresponsive   Significant cognitive deficits   Significant perceptual distortions or behavioral disorganization   Other:             CLINICAL IMPRESSIONS:  Primary:  Major Depressive Disorder  Secondary:  Depression due to other medical condition                                        IDENTIFIED NEEDS/PLAN:  [Trigger DISPOSITION list for any items marked]      Imminent safety risk - self  Imminent safety risk - others     Acute substance withdrawal   Psychosis/Impaired reality testing    x Mood/anxiety   Substance use/Addictive behavior     Maladaptive behavior   Parent/child conflict     Family/Couples conflict   Biomedical     Housing   Financial      Legal  Occupational/Educational     Domestic violence   Other:       Legal Hold: N/A      Anna Velazquez, Student  Farzana Tripathi, Ph.D., [facility]  1/28/2025   Length of Intervention: 30 minutes

## 2025-01-28 NOTE — PROGRESS NOTES
Report received from RN, assumed care of patient. Initial assessment is complete. Pt is laying in bed, resting comfortably, on 2L O2 via oxymask. Patient does not complain of pain at this time. FSBG completed per MAR. Bed is locked and in lowest position, call light is within reach, fall education has been provided, no other patient needs at this time.

## 2025-01-28 NOTE — PROGRESS NOTES
Pt alert/oriented x4, report back discomfort but declines interventions at this time. Tolerating IV abx, no adverse rxn noted. Blood sugar = 218, 2 units insulin given. Pt resting quietly in bed, needs met. Call light within reach, personal belongings available, bed in lowest position, treaded socks on, and bed alarm on. Hourly rounding in place.

## 2025-01-28 NOTE — CARE PLAN
The patient is Stable - Low risk of patient condition declining or worsening    Shift Goals  Clinical Goals: pain management throughout shift  Patient Goals: rest, sleep  Family Goals: KATHERINE    Progress made toward(s) clinical / shift goals:   Problem: Safety  Goal: Will remain free from injury  Outcome: Progressing  Goal: Will remain free from falls  Outcome: Progressing    Problem: Pain Management  Goal: Pain level will decrease to patient's comfort goal  Outcome: Progressing     Problem: Skin Integrity  Goal: Risk for impaired skin integrity will decrease  Outcome: Progressing       Patient is not progressing towards the following goals:

## 2025-01-29 LAB
ALBUMIN SERPL BCP-MCNC: 2.9 G/DL (ref 3.2–4.9)
ALBUMIN/GLOB SERPL: 0.9 G/DL
ALP SERPL-CCNC: 139 U/L (ref 30–99)
ALT SERPL-CCNC: 165 U/L (ref 2–50)
ANION GAP SERPL CALC-SCNC: 11 MMOL/L (ref 7–16)
AST SERPL-CCNC: 45 U/L (ref 12–45)
BACTERIA STL CULT: NORMAL
BASOPHILS # BLD AUTO: 0.2 % (ref 0–1.8)
BASOPHILS # BLD: 0.01 K/UL (ref 0–0.12)
BILIRUB SERPL-MCNC: 0.2 MG/DL (ref 0.1–1.5)
BUN SERPL-MCNC: 14 MG/DL (ref 8–22)
CALCIUM ALBUM COR SERPL-MCNC: 9.4 MG/DL (ref 8.5–10.5)
CALCIUM SERPL-MCNC: 8.5 MG/DL (ref 8.5–10.5)
CHLORIDE SERPL-SCNC: 96 MMOL/L (ref 96–112)
CO2 SERPL-SCNC: 31 MMOL/L (ref 20–33)
CREAT SERPL-MCNC: 0.6 MG/DL (ref 0.5–1.4)
CRP SERPL HS-MCNC: 3.72 MG/DL (ref 0–0.75)
E COLI SXT1+2 STL IA: NORMAL
EOSINOPHIL # BLD AUTO: 0.19 K/UL (ref 0–0.51)
EOSINOPHIL NFR BLD: 3.5 % (ref 0–6.9)
ERYTHROCYTE [DISTWIDTH] IN BLOOD BY AUTOMATED COUNT: 40.8 FL (ref 35.9–50)
GFR SERPLBLD CREATININE-BSD FMLA CKD-EPI: 110 ML/MIN/1.73 M 2
GLOBULIN SER CALC-MCNC: 3.1 G/DL (ref 1.9–3.5)
GLUCOSE BLD STRIP.AUTO-MCNC: 140 MG/DL (ref 65–99)
GLUCOSE BLD STRIP.AUTO-MCNC: 175 MG/DL (ref 65–99)
GLUCOSE BLD STRIP.AUTO-MCNC: 180 MG/DL (ref 65–99)
GLUCOSE BLD STRIP.AUTO-MCNC: 207 MG/DL (ref 65–99)
GLUCOSE SERPL-MCNC: 155 MG/DL (ref 65–99)
HCT VFR BLD AUTO: 29.4 % (ref 42–52)
HGB BLD-MCNC: 9.3 G/DL (ref 14–18)
IMM GRANULOCYTES # BLD AUTO: 0.02 K/UL (ref 0–0.11)
IMM GRANULOCYTES NFR BLD AUTO: 0.4 % (ref 0–0.9)
LYMPHOCYTES # BLD AUTO: 0.87 K/UL (ref 1–4.8)
LYMPHOCYTES NFR BLD: 16.2 % (ref 22–41)
MAGNESIUM SERPL-MCNC: 1.3 MG/DL (ref 1.5–2.5)
MCH RBC QN AUTO: 25.9 PG (ref 27–33)
MCHC RBC AUTO-ENTMCNC: 31.6 G/DL (ref 32.3–36.5)
MCV RBC AUTO: 81.9 FL (ref 81.4–97.8)
MONOCYTES # BLD AUTO: 0.51 K/UL (ref 0–0.85)
MONOCYTES NFR BLD AUTO: 9.5 % (ref 0–13.4)
NEUTROPHILS # BLD AUTO: 3.78 K/UL (ref 1.82–7.42)
NEUTROPHILS NFR BLD: 70.2 % (ref 44–72)
NRBC # BLD AUTO: 0 K/UL
NRBC BLD-RTO: 0 /100 WBC (ref 0–0.2)
NT-PROBNP SERPL IA-MCNC: 1771 PG/ML (ref 0–125)
PHOSPHATE SERPL-MCNC: 3.5 MG/DL (ref 2.5–4.5)
PLATELET # BLD AUTO: 161 K/UL (ref 164–446)
PMV BLD AUTO: 9.1 FL (ref 9–12.9)
POTASSIUM SERPL-SCNC: 3.9 MMOL/L (ref 3.6–5.5)
PROCALCITONIN SERPL-MCNC: 16.5 NG/ML
PROT SERPL-MCNC: 6 G/DL (ref 6–8.2)
RBC # BLD AUTO: 3.59 M/UL (ref 4.7–6.1)
SIGNIFICANT IND 70042: NORMAL
SITE SITE: NORMAL
SODIUM SERPL-SCNC: 138 MMOL/L (ref 135–145)
SOURCE SOURCE: NORMAL
WBC # BLD AUTO: 5.4 K/UL (ref 4.8–10.8)

## 2025-01-29 PROCEDURE — 84100 ASSAY OF PHOSPHORUS: CPT

## 2025-01-29 PROCEDURE — 700111 HCHG RX REV CODE 636 W/ 250 OVERRIDE (IP): Performed by: STUDENT IN AN ORGANIZED HEALTH CARE EDUCATION/TRAINING PROGRAM

## 2025-01-29 PROCEDURE — 51798 US URINE CAPACITY MEASURE: CPT

## 2025-01-29 PROCEDURE — 700105 HCHG RX REV CODE 258: Performed by: NURSE PRACTITIONER

## 2025-01-29 PROCEDURE — 86140 C-REACTIVE PROTEIN: CPT

## 2025-01-29 PROCEDURE — 99233 SBSQ HOSP IP/OBS HIGH 50: CPT | Performed by: STUDENT IN AN ORGANIZED HEALTH CARE EDUCATION/TRAINING PROGRAM

## 2025-01-29 PROCEDURE — 85025 COMPLETE CBC W/AUTO DIFF WBC: CPT

## 2025-01-29 PROCEDURE — 83735 ASSAY OF MAGNESIUM: CPT

## 2025-01-29 PROCEDURE — 700102 HCHG RX REV CODE 250 W/ 637 OVERRIDE(OP): Performed by: STUDENT IN AN ORGANIZED HEALTH CARE EDUCATION/TRAINING PROGRAM

## 2025-01-29 PROCEDURE — 770006 HCHG ROOM/CARE - MED/SURG/GYN SEMI*

## 2025-01-29 PROCEDURE — 84145 PROCALCITONIN (PCT): CPT

## 2025-01-29 PROCEDURE — 700102 HCHG RX REV CODE 250 W/ 637 OVERRIDE(OP)

## 2025-01-29 PROCEDURE — 700101 HCHG RX REV CODE 250: Performed by: NURSE PRACTITIONER

## 2025-01-29 PROCEDURE — 83880 ASSAY OF NATRIURETIC PEPTIDE: CPT

## 2025-01-29 PROCEDURE — A9270 NON-COVERED ITEM OR SERVICE: HCPCS | Performed by: NURSE PRACTITIONER

## 2025-01-29 PROCEDURE — 700111 HCHG RX REV CODE 636 W/ 250 OVERRIDE (IP): Mod: JZ | Performed by: INTERNAL MEDICINE

## 2025-01-29 PROCEDURE — A9270 NON-COVERED ITEM OR SERVICE: HCPCS

## 2025-01-29 PROCEDURE — 36415 COLL VENOUS BLD VENIPUNCTURE: CPT

## 2025-01-29 PROCEDURE — 700102 HCHG RX REV CODE 250 W/ 637 OVERRIDE(OP): Performed by: NURSE PRACTITIONER

## 2025-01-29 PROCEDURE — 82962 GLUCOSE BLOOD TEST: CPT | Mod: 91

## 2025-01-29 PROCEDURE — 80053 COMPREHEN METABOLIC PANEL: CPT

## 2025-01-29 PROCEDURE — A9270 NON-COVERED ITEM OR SERVICE: HCPCS | Performed by: INTERNAL MEDICINE

## 2025-01-29 PROCEDURE — A9270 NON-COVERED ITEM OR SERVICE: HCPCS | Performed by: STUDENT IN AN ORGANIZED HEALTH CARE EDUCATION/TRAINING PROGRAM

## 2025-01-29 PROCEDURE — 700111 HCHG RX REV CODE 636 W/ 250 OVERRIDE (IP): Mod: JZ | Performed by: NURSE PRACTITIONER

## 2025-01-29 PROCEDURE — 700102 HCHG RX REV CODE 250 W/ 637 OVERRIDE(OP): Performed by: INTERNAL MEDICINE

## 2025-01-29 RX ORDER — MAGNESIUM SULFATE HEPTAHYDRATE 40 MG/ML
4 INJECTION, SOLUTION INTRAVENOUS ONCE
Status: COMPLETED | OUTPATIENT
Start: 2025-01-29 | End: 2025-01-29

## 2025-01-29 RX ADMIN — MAGNESIUM SULFATE HEPTAHYDRATE 4 G: 4 INJECTION, SOLUTION INTRAVENOUS at 14:26

## 2025-01-29 RX ADMIN — ENOXAPARIN SODIUM 40 MG: 100 INJECTION SUBCUTANEOUS at 18:48

## 2025-01-29 RX ADMIN — GABAPENTIN 200 MG: 100 CAPSULE ORAL at 06:34

## 2025-01-29 RX ADMIN — OXYCODONE 5 MG: 5 TABLET ORAL at 18:48

## 2025-01-29 RX ADMIN — VENLAFAXINE HYDROCHLORIDE 75 MG: 75 TABLET ORAL at 06:34

## 2025-01-29 RX ADMIN — PIPERACILLIN AND TAZOBACTAM 4.5 G: 4; .5 INJECTION, POWDER, FOR SOLUTION INTRAVENOUS at 15:56

## 2025-01-29 RX ADMIN — GABAPENTIN 200 MG: 100 CAPSULE ORAL at 14:19

## 2025-01-29 RX ADMIN — PIPERACILLIN AND TAZOBACTAM 4.5 G: 4; .5 INJECTION, POWDER, FOR SOLUTION INTRAVENOUS at 06:33

## 2025-01-29 RX ADMIN — OMEPRAZOLE 40 MG: 20 CAPSULE, DELAYED RELEASE ORAL at 06:34

## 2025-01-29 RX ADMIN — INSULIN LISPRO 1 UNITS: 100 INJECTION, SOLUTION INTRAVENOUS; SUBCUTANEOUS at 21:06

## 2025-01-29 RX ADMIN — LIDOCAINE 3 PATCH: 4 PATCH TOPICAL at 16:26

## 2025-01-29 RX ADMIN — OXYCODONE 5 MG: 5 TABLET ORAL at 10:21

## 2025-01-29 RX ADMIN — PIPERACILLIN AND TAZOBACTAM 4.5 G: 4; .5 INJECTION, POWDER, FOR SOLUTION INTRAVENOUS at 20:55

## 2025-01-29 RX ADMIN — OMEPRAZOLE 40 MG: 20 CAPSULE, DELAYED RELEASE ORAL at 18:48

## 2025-01-29 RX ADMIN — INSULIN LISPRO 1 UNITS: 100 INJECTION, SOLUTION INTRAVENOUS; SUBCUTANEOUS at 18:53

## 2025-01-29 RX ADMIN — INSULIN LISPRO 2 UNITS: 100 INJECTION, SOLUTION INTRAVENOUS; SUBCUTANEOUS at 13:30

## 2025-01-29 RX ADMIN — GABAPENTIN 200 MG: 100 CAPSULE ORAL at 18:48

## 2025-01-29 ASSESSMENT — PAIN DESCRIPTION - PAIN TYPE
TYPE: ACUTE PAIN;CHRONIC PAIN

## 2025-01-29 ASSESSMENT — ENCOUNTER SYMPTOMS
WEAKNESS: 1
NAUSEA: 0
PALPITATIONS: 0
HEADACHES: 0
DIAPHORESIS: 0
SHORTNESS OF BREATH: 1
FEVER: 0
BACK PAIN: 1
DEPRESSION: 0
BLURRED VISION: 0
SPUTUM PRODUCTION: 0
COUGH: 1
ABDOMINAL PAIN: 0
DIZZINESS: 0
CHILLS: 0
MYALGIAS: 1
WHEEZING: 0
NERVOUS/ANXIOUS: 0
DOUBLE VISION: 0

## 2025-01-29 ASSESSMENT — PAIN SCALES - WONG BAKER: WONGBAKER_NUMERICALRESPONSE: HURTS JUST A LITTLE BIT

## 2025-01-29 NOTE — DISCHARGE PLANNING
Case Management Discharge Planning    Admission Date: 1/9/2025  GMLOS: 5.2  ALOS: 19    6-Clicks ADL Score: 18  6-Clicks Mobility Score: 20      Anticipated Discharge Dispo: Discharge Disposition: Discharged to home/self care (01)    This RN CM completed chart review     [id number] This RN called Rae[facility]  [phone] and left voicemail for follow up.     1320 This RN CM called [employer] [facility] [employer] and they will share list for [facility] room and board facilities that are contracted with [facility].

## 2025-01-29 NOTE — PROGRESS NOTES
Pt alert/oriented x4, fatigued, resting quietly in bed. Tolerating IV abx. Blood sugar=174, 1 unit insulin given. Call light within reach, personal belongings available, bed in lowest position, treaded socks on, and bed alarm on. Hourly rounding in place.

## 2025-01-29 NOTE — PROGRESS NOTES
Hospital Medicine Daily Progress Note    Date of Service  1/28/2025    Chief Complaint  Christoph Taylor is a 59 y.o. male admitted 1/9/2025 with weakness.    Hospital Course  Christoph Taylor is a 59-year-old male with prior suicidal ideation, poorly controlled diabetes mellitus type 2, housing insecurity and severe protein calorie malnutrition.  Admitted 1/9 for acute on chronic back pain.    Patient states that he is unable to take care of himself and has a hard time administering insulin, buying food for himself, taking his blood sugars. Additionally says that he has worsening neuropathy of the feet and hands. His pain is worse than ever. He has no additional help here.    In the ER he was found to be hyperglycemic and quite weak.  UA was concerning for possible UTI.  Patient was given 1 dose of IV cefazolin.  However, he denied urinary symptoms and antibiotics were not continued.     In terms of patient's weakness-MRI thoracic and lumbar notable for old compression fracture at T12 and multiple disc bulging without quinton central canal stenosis or cord impingement.  Neurosurgery was consulted.  Dr. Johnson recommending non operative management and outpatient follow-up for possible epidural injection.  PT OT recommending postacute rehabilitation.  However, no accepting facilities were identified.  Patient intermittently refusing to work with PT here.    Home health has been arranged prior to discharge.    Interval Problem Update  1/14: Vitals stable overnight.  -129.  Patient is medically clear for discharge pending placement.    1/15: Vitals remained stable.   through 156.  Glucose ranging 104 through 246.  Case management is working hard to obtain patient's insurance card to evaluate benefits for postacute rehabilitation.  Patient is medically cleared to discharge once insurance barrier has been resolved.    1/16: Vitals stable.  Glucose this a.m. 176.  Range 2 29 through 3 05.  Increase Lantus to  25 units in the p.m.  Multiple long discussions with patient, case management and nursing staff regarding discharge plan.  Patient feels unsafe to discharge to his home.  He has 5 steps.  He does not have a ramp.  He is largely wheelchair-bound due to chronic pain.    1/17: Vital stable overnight.  -130.  Glucose improved with increase Lantus dosing.  A.m. blood sugar 104.  Patient is now being followed by complex discharge committee.    1/18: Vitals notable for SBP .  Glucose ranging 125 through 196.  Patient required 1 unit sliding scale insulin yesterday.  Patient remains medically clear for discharge though a safe discharge plan has not been arranged yet.  Encouraged ambulation at least 3 times daily.    1/19: Vital stable overnight.   through 126.  Glucose range .  Required 2 units SSI over last 24 hours.    1/20: Vitals stable overnight.  Patient endorsing depression and refusal to participate in medical therapies.  I have placed a referral for inpatient psychology/behavioral health.  Increase venlafaxine to 75 mg.  Continue to encourage participation in ambulation and patient rehabilitation.    1/21 Patient able to self transfer to chair, reports ongoing low back pain, pain level of 7/10  States that he is now wheelchair-bound, pending placement,  to assist  Patient with hypoglycemic episode this a.m. was given juice, unable to self administer insulin at home, will need placement assistance    1/22 no new events, sleeping arousable, AFO delivered for PT reeval    1/23 reports low back pain, able to transfer to chair, PT blanca    1/24 ongoing lbp, min activity, Has not worked with PT with AFO, to reconsult  Improving blood sugar    1/25 patient with hypoglycemia this morning, blood sugar of 69, arousable improved after dextrose administration, Lantus decreased patient did work with PT with AFO, encourage activity    1/26 improved blood sugar, patient sitting up in chair, no  new complaints  Patient did have increased activity with AFO, continue to encourage activity    1/27 patient was noted to be hypotensive with increasing respiratory failure requiring 4-5 L oxygen supplementation, chest x-ray with infiltrates  Patient was started on septic protocol, given 4 L of IV fluid hydration as well as initiated on IV antibiotics, UA is negative  Patient does not recall events of last evening, patient feels mild low back pain, patient does report dry cough, still on 4 to 5 L, taper and oxygen needs  Encourage activity    1/28 TAKING OVER CARE  Afebrile, normal HR/RR, 's-140's, SpO2 95-97% on 2 L NC.  No leukocytosis, stable anemia, mild thrombocytopenia.  LFT's improving, blood cultures remain negative.  Repeat procalcitonin in the morning, check CRP, CBC.  Suspect will be de-escalating antibiotics tomorrow.  Feeling general fatigue.  Tolerating p.o. intake.    I have discussed this patient's plan of care and discharge plan at IDT rounds today with Case Management, Nursing, Nursing leadership, and other members of the IDT team.    Consultants/Specialty  None at this time     Code Status  Full Code    Disposition  Medically Cleared  I have placed the appropriate orders for post-discharge needs.    Review of Systems  Review of Systems   Constitutional:  Positive for malaise/fatigue. Negative for chills, diaphoresis and fever.   Eyes:  Negative for blurred vision and double vision.   Respiratory:  Positive for cough and shortness of breath. Negative for sputum production and wheezing.    Cardiovascular:  Negative for palpitations.   Gastrointestinal:  Negative for abdominal pain and nausea.   Genitourinary:  Negative for dysuria.   Musculoskeletal:  Positive for back pain and myalgias.   Neurological:  Positive for weakness. Negative for dizziness and headaches.   Psychiatric/Behavioral:  Negative for depression. The patient is not nervous/anxious.         Physical Exam  Temp:  [36.6 °C  (97.8 °F)-37.1 °C (98.8 °F)] 36.6 °C (97.9 °F)  Pulse:  [82-95] 82  Resp:  [18] 18  BP: (125-149)/(74-96) 125/74  SpO2:  [95 %-97 %] 95 %    Physical Exam  Vitals and nursing note reviewed.   Constitutional:       General: He is not in acute distress.     Appearance: Normal appearance. He is not toxic-appearing or diaphoretic.   HENT:      Mouth/Throat:      Mouth: Mucous membranes are moist.   Eyes:      Extraocular Movements: Extraocular movements intact.      Conjunctiva/sclera: Conjunctivae normal.   Cardiovascular:      Rate and Rhythm: Regular rhythm.      Pulses: Normal pulses.   Pulmonary:      Effort: Pulmonary effort is normal. No respiratory distress.      Breath sounds: No wheezing, rhonchi or rales.      Comments: Decreased breath sounds bilaterally  Abdominal:      General: There is no distension.      Palpations: There is no mass.      Tenderness: There is no abdominal tenderness.   Musculoskeletal:         General: No swelling or tenderness.      Cervical back: Normal range of motion.   Skin:     General: Skin is warm.      Coloration: Skin is pale. Skin is not jaundiced.      Findings: No erythema.   Neurological:      Mental Status: He is alert and oriented to person, place, and time. Mental status is at baseline.      Cranial Nerves: No cranial nerve deficit.      Sensory: No sensory deficit.      Motor: Weakness present.      Coordination: Coordination normal.   Psychiatric:         Mood and Affect: Mood normal.         Behavior: Behavior normal.         Fluids    Intake/Output Summary (Last 24 hours) at 1/29/2025 0031  Last data filed at 1/28/2025 1927  Gross per 24 hour   Intake 1070 ml   Output 5500 ml   Net -4430 ml        Laboratory  Recent Labs     01/26/25  2239 01/27/25  0222 01/28/25  0631   WBC 6.4 8.5 5.6   RBC 3.55* 3.18* 3.39*   HEMOGLOBIN 9.2* 8.3* 8.7*   HEMATOCRIT 29.2* 26.1* 27.4*   MCV 82.3 82.1 80.8*   MCH 25.9* 26.1* 25.7*   MCHC 31.5* 31.8* 31.8*   RDW 41.7 42.2 40.8    PLATELETCT 179 179 144*   MPV 8.9* 9.1 9.2     Recent Labs     01/26/25  2239 01/27/25  0222 01/28/25  0631   SODIUM 137 134* 139   POTASSIUM 4.7 4.8 4.2   CHLORIDE 98 98 102   CO2 27 27 30   GLUCOSE 207* 215* 169*   BUN 40* 40* 21   CREATININE 1.34 1.34 0.74   CALCIUM 8.3* 8.0* 8.3*     Recent Labs     01/26/25  2239   INR 1.04               Imaging  US-RUQ   Final Result      1.  Distended gallbladder with gallbladder sludge and a small calcified gallstone. No discrete sonographic evidence of acute cholecystitis.   2.  Mildly dilated main portal vein which could indicate portal hypertension      DX-CHEST-PORTABLE (1 VIEW)   Final Result      Left basilar atelectasis. Underlying infection is possible.      MR-LUMBAR SPINE-W/O   Final Result      1.  T12 chronic compression fracture with anterior wedge deformity.   2.  Postoperative changes with interval L4-5 laminectomy since prior MRI study. There is now laminectomy at L4-5-S1 and there is now posterior fusion with transpedicular screw fixation at L4-L5. Relief of previously seen severe central stenosis at L4-5.   3.  Multilevel degenerative changes as detailed for each level above in the body of report.      MR-THORACIC SPINE-W/O   Final Result      1.  T12 old compression fracture with anterior wedge deformity. Associated mild lower thoracic kyphosis.   2.  T2 old superior endplate compression fracture.   3.  Multilevel disc bulges T3-T4, T7-T8, T8-T9, T11-12, T12-L1. Variable mass effect on the ventral thecal sac without quinton central stenosis or cord impingement at any thoracic level.   4.  No myelopathic cord signal at any thoracic level.      DX-CHEST-PORTABLE (1 VIEW)   Final Result      No acute cardiopulmonary disease evident.           Assessment/Plan  * Compression fracture of body of thoracic vertebra (HCC)- (present on admission)  Assessment & Plan  MRI T-spine with T12 old compression fracture, T2 old superior endplate compression fracture,  multiple level disc bulges T3-4 T7-T8 T8-T9, T11-T12, variable mass effect with out  central stenosis or cord impingement, MRI L-spine with T12 compression fracture, previous L4-L5 laminectomy, multilevel degenerative changes.  - Neurosurgery has been consulted  - Recommending non operative management at this time  - Continue to follow-up outpatient for possible epidural  - Continue gabapentin, lidocaine and oxycodone as needed for pain management  - PT OT recommending postacute rehabilitation  - I personally reviewed MRI images with patient at bedside 1/16 1/21 placement pending, f/u with CM  - ongoing LBP, schedule flexeril    1/22 PT reeval with AFO now available  Pain control, encourage activity, placement pending    1/23 PT  with AFO, encourage activity, pending placement, CM to assist, ? GH  1/24 PT to work with patient with AFO, consult replaced  Encourage activity    1/25 patient worked with PT with AFO, encourage activity    1/26 cont activity, placement pending    1/27 difficult discharge,  assisting  Considering transfer to another state to be closer to family, patient can no longer live alone, cannot self administer insulin or take care of himself  Encourage activity  Placement pending    Sepsis (HCC)  Assessment & Plan  This is Sepsis Not present on admission  SIRS criteria identified on my evaluation include: Tachycardia, with heart rate greater than 90 BPM and Tachypnea, with respirations greater than 20 per minute  Clinical indicators of end organ dysfunction include Hypotension with systolic blood pressure less than 90 or MAP less than 65, Lactic Acid greater than 2, and Acute Respiratory Failure - (mechanical ventilation or BiPap or PaO2/FiO2 ratio < 300)  Source is pneumonia/lungs  Sepsis protocol initiated  Crystalloid Fluid Administration: Fluid resuscitation ordered per standard protocol - 30 mL/kg per current or ideal body weight  IV antibiotics as appropriate for source of  sepsis  Reassessment: I have reassessed the patient's hemodynamic status    1/27 septic protocol initiated, received 4 L of fluid hydration, improved blood pressure, lactic acidosis resolved, will follow-up blood a.m. lactic acid level, repeat is at 2  Borderline hypotension, continue fluid hydration  Patient now on IV antibiotics, follow-up cultures  Taper oxygen as tolerated, chest x-ray concerning for infiltrates    Moderate episode of recurrent major depressive disorder (HCC)  Assessment & Plan  Patient endorsing depression.  Unwillingness to participate in his rehabilitation.  Endorses feelings of helplessness and hopelessness.  Per nursing staff he has expressed SI without a plan.  He does not endorse SI to me; on 2 separate occasions.  - Continue to encourage patient to participate in his rehabilitation.  - Increase venlafaxine to 75 mg  - Inpatient behavioral health referral has been placed.  - I do not see an indication to initiate a legal hold at this point.  Patient denies SI and SI plan to me.  Per nursing staff patient does become agitated when his snacks are withheld.  We will continue to monitor the situation closely.    Bacteriuria- (present on admission)  Assessment & Plan  No abx's for now given lack of symptoms    Physical debility- (present on admission)  Assessment & Plan  MRI with compression T2 and T12 fracture, multilevel bulging disc through thoracic spine.  No cord impingement.  Neurosurgery recommended nonoperative management  Follow-up for epidural injection as outpatient  Placement is pending    1/21 Patient reports ambulatory status prior to this admission, to follow-up with orthopedic surgery for AFO evaluation  , Will reconsult PT OT for AFO as needed, will consult orthopedic surgery as needed  -Patient reports low back pain, has Zanaflex as needed, will add Flexeril 3 times daily  Continue pain control  Encourage activity    Acute hypoxic respiratory failure (HCC)- (present on  admission)  Assessment & Plan  Resolved  Now on room air, continue to monitor oxygen requirement    Elevated troponin- (present on admission)  Assessment & Plan  Chronically elevated. Baseline  - no further workup    Uncontrolled type 2 diabetes mellitus with hyperglycemia (HCC)- (present on admission)  Assessment & Plan  A1C 9.8  - Continue Lantus to 25 units in the evening  - Continue sliding scale insulin; has required 2 units SSI in last 24 hours  - Patient states he is unable to drop insulin at home due to hand coordination issues.  We will try to minimize sliding scale insulin and discharged with only Lantus pens.  - Monitor BG trend.   - Accu-Cheks before meals and at bedtime.   - Goal to keep BG between 140-180 per 2019 ADA guidelines     1/21 hypoglycemia this am, decrease Lantus to 18 units, cont ssi  - f/u am labs    1/22 bs improved, on lantus 18 units, cont and monitor  ? GH    1/23 improvng bs, increase Lantus 20 u, add metformin, monitor    1/24 improving, cont lantus, f/u am bmp, consider increasing metformin    1/25 hypoglycemia this a.m., decreased metformin and Lantus, monitor closely    1/26 improved bs, now on lantus 10u, monitor, encourage activity    1/27 improved control, continue Lantus    CKD (chronic kidney disease)- (present on admission)  Assessment & Plan  Creatinine stable at 0.57  Stable, secondary to diabetes       VTE prophylaxis:    enoxaparin ppx      I have performed a physical exam and reviewed and updated ROS and Plan today (1/28/2025). In review of yesterday's note (1/27/2025), there are no changes except as documented above.  Total time spent 53 minutes. I spent greater than 50% of the time for patient care, counseling, and coordination on this date, including unit/floor time, and face-to-face time with the patient as per interval events, my own review of patient's imaging and lab analysis and developing my assessment and plan above.

## 2025-01-29 NOTE — CARE PLAN
Problem: Safety  Goal: Will remain free from injury  Outcome: Progressing  Goal: Will remain free from falls  Outcome: Progressing     Problem: Pain Management  Goal: Pain level will decrease to patient's comfort goal  Outcome: Progressing       Problem: Infection  Goal: Will remain free from infection  Outcome: Progressing     Problem: Discharge Barriers/Planning  Goal: Patient's continuum of care needs are met  Outcome: Progressing     Problem: Bowel Elimination  Goal: Establish and maintain regular bowel function  Outcome: Progressing     Problem: Mobility  Goal: Patient's capacity to carry out activities will improve  Outcome: Progressing     Problem: Self Care  Goal: Patient will have the ability to perform ADLs independently or with assistance (bathe, groom, dress, toilet and feed)  Outcome: Progressing     Problem: Skin Integrity  Goal: Risk for impaired skin integrity will decrease  Outcome: Progressing   The patient is Stable - Low risk of patient condition declining or worsening    Shift Goals  Clinical Goals: VSS, comfort, pain less than 5/10 on pain scale  Patient Goals: Rest  Family Goals: Not Present    Progress made toward(s) clinical / shift goals:    Pt repositioned self during this RN's shift. Pt remains on 2L via nasal cannula. Pt had good output during this RN's shift. Vital signs were stable. Pt has call light within reach. Pt's bed alarm remains in place.    Patient is not progressing towards the following goals:  Pt's' jiménez was removed and RN had to straight cath pt.

## 2025-01-29 NOTE — CARE PLAN
The patient is Stable - Low risk of patient condition declining or worsening    Shift Goals  Clinical Goals: pt will maintain O2 saturation >90% throughout shift  Patient Goals: rest  Family Goals: Not Present    Progress made toward(s) clinical / shift goals:    Problem: Safety  Goal: Will remain free from injury  Outcome: Progressing  Goal: Will remain free from falls  Outcome: Progressing     Problem: Pain Management  Goal: Pain level will decrease to patient's comfort goal  Outcome: Progressing     Problem: Bowel Elimination  Goal: Establish and maintain regular bowel function  Outcome: Progressing     Problem: Skin Integrity  Goal: Risk for impaired skin integrity will decrease  Outcome: Progressing       Patient is not progressing towards the following goals:

## 2025-01-30 LAB
GLUCOSE BLD STRIP.AUTO-MCNC: 180 MG/DL (ref 65–99)
GLUCOSE BLD STRIP.AUTO-MCNC: 201 MG/DL (ref 65–99)
GLUCOSE BLD STRIP.AUTO-MCNC: 228 MG/DL (ref 65–99)
GLUCOSE BLD STRIP.AUTO-MCNC: 234 MG/DL (ref 65–99)

## 2025-01-30 PROCEDURE — 700102 HCHG RX REV CODE 250 W/ 637 OVERRIDE(OP)

## 2025-01-30 PROCEDURE — 700111 HCHG RX REV CODE 636 W/ 250 OVERRIDE (IP): Mod: JZ | Performed by: INTERNAL MEDICINE

## 2025-01-30 PROCEDURE — 82962 GLUCOSE BLOOD TEST: CPT

## 2025-01-30 PROCEDURE — A9270 NON-COVERED ITEM OR SERVICE: HCPCS

## 2025-01-30 PROCEDURE — 700102 HCHG RX REV CODE 250 W/ 637 OVERRIDE(OP): Performed by: STUDENT IN AN ORGANIZED HEALTH CARE EDUCATION/TRAINING PROGRAM

## 2025-01-30 PROCEDURE — A9270 NON-COVERED ITEM OR SERVICE: HCPCS | Performed by: INTERNAL MEDICINE

## 2025-01-30 PROCEDURE — 97535 SELF CARE MNGMENT TRAINING: CPT

## 2025-01-30 PROCEDURE — A9270 NON-COVERED ITEM OR SERVICE: HCPCS | Performed by: STUDENT IN AN ORGANIZED HEALTH CARE EDUCATION/TRAINING PROGRAM

## 2025-01-30 PROCEDURE — 700105 HCHG RX REV CODE 258: Performed by: NURSE PRACTITIONER

## 2025-01-30 PROCEDURE — 770006 HCHG ROOM/CARE - MED/SURG/GYN SEMI*

## 2025-01-30 PROCEDURE — 99233 SBSQ HOSP IP/OBS HIGH 50: CPT | Performed by: STUDENT IN AN ORGANIZED HEALTH CARE EDUCATION/TRAINING PROGRAM

## 2025-01-30 PROCEDURE — 700102 HCHG RX REV CODE 250 W/ 637 OVERRIDE(OP): Performed by: NURSE PRACTITIONER

## 2025-01-30 PROCEDURE — 700102 HCHG RX REV CODE 250 W/ 637 OVERRIDE(OP): Performed by: INTERNAL MEDICINE

## 2025-01-30 PROCEDURE — 700101 HCHG RX REV CODE 250: Performed by: NURSE PRACTITIONER

## 2025-01-30 PROCEDURE — A9270 NON-COVERED ITEM OR SERVICE: HCPCS | Performed by: NURSE PRACTITIONER

## 2025-01-30 PROCEDURE — 700111 HCHG RX REV CODE 636 W/ 250 OVERRIDE (IP): Mod: JZ | Performed by: NURSE PRACTITIONER

## 2025-01-30 RX ADMIN — ENOXAPARIN SODIUM 40 MG: 100 INJECTION SUBCUTANEOUS at 17:41

## 2025-01-30 RX ADMIN — INSULIN LISPRO 2 UNITS: 100 INJECTION, SOLUTION INTRAVENOUS; SUBCUTANEOUS at 21:22

## 2025-01-30 RX ADMIN — OXYCODONE 5 MG: 5 TABLET ORAL at 16:57

## 2025-01-30 RX ADMIN — INSULIN LISPRO 1 UNITS: 100 INJECTION, SOLUTION INTRAVENOUS; SUBCUTANEOUS at 08:18

## 2025-01-30 RX ADMIN — INSULIN LISPRO 2 UNITS: 100 INJECTION, SOLUTION INTRAVENOUS; SUBCUTANEOUS at 11:49

## 2025-01-30 RX ADMIN — INSULIN LISPRO 2 UNITS: 100 INJECTION, SOLUTION INTRAVENOUS; SUBCUTANEOUS at 17:45

## 2025-01-30 RX ADMIN — VENLAFAXINE HYDROCHLORIDE 75 MG: 75 TABLET ORAL at 04:47

## 2025-01-30 RX ADMIN — GABAPENTIN 200 MG: 100 CAPSULE ORAL at 17:41

## 2025-01-30 RX ADMIN — GABAPENTIN 200 MG: 100 CAPSULE ORAL at 04:46

## 2025-01-30 RX ADMIN — OXYCODONE 5 MG: 5 TABLET ORAL at 04:46

## 2025-01-30 RX ADMIN — PIPERACILLIN AND TAZOBACTAM 4.5 G: 4; .5 INJECTION, POWDER, FOR SOLUTION INTRAVENOUS at 04:41

## 2025-01-30 RX ADMIN — OMEPRAZOLE 40 MG: 20 CAPSULE, DELAYED RELEASE ORAL at 17:41

## 2025-01-30 RX ADMIN — GABAPENTIN 200 MG: 100 CAPSULE ORAL at 11:51

## 2025-01-30 RX ADMIN — OXYCODONE 5 MG: 5 TABLET ORAL at 11:59

## 2025-01-30 RX ADMIN — AMOXICILLIN AND CLAVULANATE POTASSIUM 1 TABLET: 875; 125 TABLET, FILM COATED ORAL at 17:41

## 2025-01-30 RX ADMIN — OMEPRAZOLE 40 MG: 20 CAPSULE, DELAYED RELEASE ORAL at 04:47

## 2025-01-30 RX ADMIN — LIDOCAINE 3 PATCH: 4 PATCH TOPICAL at 17:03

## 2025-01-30 ASSESSMENT — PAIN DESCRIPTION - PAIN TYPE
TYPE: ACUTE PAIN
TYPE: ACUTE PAIN
TYPE: ACUTE PAIN;CHRONIC PAIN
TYPE: ACUTE PAIN;CHRONIC PAIN
TYPE: ACUTE PAIN

## 2025-01-30 ASSESSMENT — COGNITIVE AND FUNCTIONAL STATUS - GENERAL
DAILY ACTIVITIY SCORE: 18
HELP NEEDED FOR BATHING: A LITTLE
PERSONAL GROOMING: A LITTLE
EATING MEALS: A LITTLE
SUGGESTED CMS G CODE MODIFIER DAILY ACTIVITY: CK
DRESSING REGULAR LOWER BODY CLOTHING: A LITTLE
TOILETING: A LITTLE
DRESSING REGULAR UPPER BODY CLOTHING: A LITTLE

## 2025-01-30 ASSESSMENT — ENCOUNTER SYMPTOMS
ABDOMINAL PAIN: 0
SPUTUM PRODUCTION: 0
HEADACHES: 0
DIZZINESS: 0
PALPITATIONS: 0
WEAKNESS: 1
COUGH: 1
WHEEZING: 0
DOUBLE VISION: 0
CHILLS: 0
MYALGIAS: 1
BLURRED VISION: 0
BACK PAIN: 1
DIAPHORESIS: 0
NERVOUS/ANXIOUS: 0
SHORTNESS OF BREATH: 1
NAUSEA: 0
FEVER: 0
DEPRESSION: 0

## 2025-01-30 NOTE — PROGRESS NOTES
Pt alert/oriented x4, no report of pain/discomfort at this time. Tolerating IV abx. Blood glucose = 180, 1 unit insulin given. Pt fatigued, resting quietly in bed. Call light within reach, personal belongings available, bed in lowest position, treaded socks on, and bed alarm on. Hourly rounding in place.

## 2025-01-30 NOTE — CARE PLAN
The patient is Stable - Low risk of patient condition declining or worsening    Shift Goals  Clinical Goals: pt will maintain O2 saturation >90% throughout shift.  Patient Goals: comfort and safety  Family Goals: KATHERINE    Progress made toward(s) clinical / shift goals:    Problem: Safety  Goal: Will remain free from falls  Outcome: Progressing     Problem: Pain Management  Goal: Pain level will decrease to patient's comfort goal  Outcome: Progressing     Problem: Skin Integrity  Goal: Risk for impaired skin integrity will decrease  Outcome: Progressing       Patient is not progressing towards the following goals:      Problem: Discharge Barriers/Planning  Goal: Patient's continuum of care needs are met  Outcome: Not Progressing     Pt awaiting placement, pending insurance.

## 2025-01-30 NOTE — CARE PLAN
The patient is Watcher - Medium risk of patient condition declining or worsening    Shift Goals  Clinical Goals: pt will tolerate room air and maintain o2 sat above 90%  Patient Goals: comfort and safety  Family Goals: KATHERINE    Progress made toward(s) clinical / shift goals:    Problem: Safety  Goal: Will remain free from injury  Outcome: Progressing     Problem: Pain Management  Goal: Pain level will decrease to patient's comfort goal  Outcome: Progressing  Flowsheets (Taken 1/30/2025 1422)  Comfort Goal:   Comfort with Movement   Perform Activity  Non Verbal Scale:   Sleeping   Calm  Pain Rating Scale (NPRS): 8     Problem: Infection  Goal: Will remain free from infection  Outcome: Progressing  Note: Remains on abx with no adverse reactions noted. Vitals within normal limits.      Problem: Mobility  Goal: Patient's capacity to carry out activities will improve  Outcome: Progressing  Flowsheets  Taken 1/30/2025 1422  Mobility:   Encouraged mobilization per interdisciplinary team recommendations   Collaborated with PT/OT   Administered pain management to allow progressive mobilization  Taken 1/30/2025 1000  Level of Mobility: Level IV  Activity Performed:   Ambulate   Up to chair  Time Activity Tolerated: 20 min  Distance Per Occurrence (ft.): 10 feet  # of Times Distance was Traveled: 2  Assistance: Assistance of One     Problem: Self Care  Goal: Patient will have the ability to perform ADLs independently or with assistance (bathe, groom, dress, toilet and feed)  Outcome: Progressing     Problem: Skin Integrity  Goal: Risk for impaired skin integrity will decrease  Outcome: Progressing  Note: Skin appears dry and intact. No new skin breakdown noted        Patient is not progressing towards the following goals:

## 2025-01-30 NOTE — THERAPY
Occupational Therapy  Discharge      Patient Name: Christoph Taylor  Age:  59 y.o., Sex:  male  Medical Record #: [id number]  Today's Date: 1/30/2025     Precautions  Precautions: (P) Fall Risk  Comments: L AFO, decreased vision    Assessment    Pt seen for follow up OT tx session, pt likely at functional baseline due to chronic deficits involving BUEs and fingers in particular that has been a longstanding problem. Pt would benefit from a more supportive environment at discharge for ADL/IADL assistance.    Plan    Reason for Discharge From Therapy: (P) Discharge Secondary to No Likely Benefit    DC Equipment Recommendations: (P) None  Discharge Recommendations: (P)  (More supportive environment due to chronic deficits)    Objective       01/30/25 1105   Precautions   Precautions Fall Risk   Vitals   O2 Delivery Device None - Room Air   Cognition    Cognition / Consciousness WDL   Level of Consciousness Alert   Balance   Sitting Balance (Static) Fair   Sitting Balance (Dynamic) Fair   Standing Balance (Static) Fair   Standing Balance (Dynamic) Fair   Weight Shift Sitting Fair   Weight Shift Standing Fair   Skilled Intervention Verbal Cuing   Comments w/ FWW and AFO   Bed Mobility    Supine to Sit Supervised   Scooting Supervised   Rolling Supervised   Skilled Intervention Verbal Cuing;Facilitation   Activities of Daily Living   Grooming Supervision;Seated   Upper Body Dressing Supervision   Lower Body Dressing Contact Guard Assist   Toileting Maximal Assist   Skilled Intervention Verbal Cuing;Facilitation   How much help from another person does the patient currently need...   Putting on and taking off regular lower body clothing? 3   Bathing (including washing, rinsing, and drying)? 3   Toileting, which includes using a toilet, bedpan, or urinal? 3   Putting on and taking off regular upper body clothing? 3   Taking care of personal grooming such as brushing teeth? 3   Eating meals? 3   6 Clicks Daily Activity Score  18   Functional Mobility   Sit to Stand Supervised   Bed, Chair, Wheelchair Transfer Supervised   Toilet Transfers Supervised   Transfer Method Stand Step   Mobility bed mobility, mobility into hallway, up to bedside commode, transfer to chair   Skilled Intervention Verbal Cuing;Facilitation   Comments w/ FWW and AFO   Activity Tolerance   Sitting in Chair left seated in chair   Sitting Edge of Bed 15 min   Standing 20 min   Patient / Family Goals   Patient / Family Goal #1 To get better   Goal #1 Outcome Goal met   Short Term Goals   Short Term Goal # 1 Pt will complete ADL txfs with supv   Goal Outcome # 1 Goal met   Short Term Goal # 2 Pt will complete LB dressing with supv using AE PRN   Goal Outcome # 2 Progressing as expected   Short Term Goal # 3 Pt will complete toileting ADLs with supv   Goal Outcome # 3 Progressing slower than expected   Short Term Goal # 4 Pt will complete standing g/h routine with supv   Goal Outcome # 4 Goal met   Education Group   Education Provided Role of Occupational Therapist   Role of Occupational Therapist Patient Response Patient;Acceptance;Explanation;Verbal Demonstration   Occupational Therapy Treatment Plan    O.T. Treatment Plan Modify Current Treatment Plan   Duration Discharge Needs Only   Reason For Discharge Discharge Secondary to No Likely Benefit   Anticipated Discharge Equipment and Recommendations   DC Equipment Recommendations None   Discharge Recommendations   (More supportive environment due to chronic deficits)   Interdisciplinary Plan of Care Collaboration   IDT Collaboration with  Nursing   Patient Position at End of Therapy Seated;Chair Alarm On;Call Light within Reach;Tray Table within Reach;Phone within Reach   Collaboration Comments RN updated

## 2025-01-30 NOTE — CONSULTS
Brief Behavioral Health Care Note    Clinician attempted to meet with patient however patient was receiving care from another provider. Will follow up at another time.    Thank you    Farzana Tripathi, Ph.D., [facility]

## 2025-01-30 NOTE — PROGRESS NOTES
Hospital Medicine Daily Progress Note    Date of Service  1/29/2025    Chief Complaint  Christoph Taylor is a 59 y.o. male admitted 1/9/2025 with weakness.    Hospital Course  Christoph Taylor is a 59-year-old male with prior suicidal ideation, poorly controlled diabetes mellitus type 2, housing insecurity and severe protein calorie malnutrition.  Admitted 1/9 for acute on chronic back pain.    Patient states that he is unable to take care of himself and has a hard time administering insulin, buying food for himself, taking his blood sugars. Additionally says that he has worsening neuropathy of the feet and hands. His pain is worse than ever. He has no additional help here.    In the ER he was found to be hyperglycemic and quite weak.  UA was concerning for possible UTI.  Patient was given 1 dose of IV cefazolin.  However, he denied urinary symptoms and antibiotics were not continued.     In terms of patient's weakness-MRI thoracic and lumbar notable for old compression fracture at T12 and multiple disc bulging without quinton central canal stenosis or cord impingement.  Neurosurgery was consulted.  Dr. Johnson recommending non operative management and outpatient follow-up for possible epidural injection.  PT OT recommending postacute rehabilitation.  However, no accepting facilities were identified.  Patient intermittently refusing to work with PT here.    Home health has been arranged prior to discharge.    Interval Problem Update  1/14: Vitals stable overnight.  -129.  Patient is medically clear for discharge pending placement.    1/15: Vitals remained stable.   through 156.  Glucose ranging 104 through 246.  Case management is working hard to obtain patient's insurance card to evaluate benefits for postacute rehabilitation.  Patient is medically cleared to discharge once insurance barrier has been resolved.    1/16: Vitals stable.  Glucose this a.m. 176.  Range 2 29 through 3 05.  Increase Lantus to  25 units in the p.m.  Multiple long discussions with patient, case management and nursing staff regarding discharge plan.  Patient feels unsafe to discharge to his home.  He has 5 steps.  He does not have a ramp.  He is largely wheelchair-bound due to chronic pain.    1/17: Vital stable overnight.  -130.  Glucose improved with increase Lantus dosing.  A.m. blood sugar 104.  Patient is now being followed by complex discharge committee.    1/18: Vitals notable for SBP .  Glucose ranging 125 through 196.  Patient required 1 unit sliding scale insulin yesterday.  Patient remains medically clear for discharge though a safe discharge plan has not been arranged yet.  Encouraged ambulation at least 3 times daily.    1/19: Vital stable overnight.   through 126.  Glucose range .  Required 2 units SSI over last 24 hours.    1/20: Vitals stable overnight.  Patient endorsing depression and refusal to participate in medical therapies.  I have placed a referral for inpatient psychology/behavioral health.  Increase venlafaxine to 75 mg.  Continue to encourage participation in ambulation and patient rehabilitation.    1/21 Patient able to self transfer to chair, reports ongoing low back pain, pain level of 7/10  States that he is now wheelchair-bound, pending placement,  to assist  Patient with hypoglycemic episode this a.m. was given juice, unable to self administer insulin at home, will need placement assistance    1/22 no new events, sleeping arousable, AFO delivered for PT reeval    1/23 reports low back pain, able to transfer to chair, PT blanca    1/24 ongoing lbp, min activity, Has not worked with PT with AFO, to reconsult  Improving blood sugar    1/25 patient with hypoglycemia this morning, blood sugar of 69, arousable improved after dextrose administration, Lantus decreased patient did work with PT with AFO, encourage activity    1/26 improved blood sugar, patient sitting up in chair, no  new complaints  Patient did have increased activity with AFO, continue to encourage activity    1/27 patient was noted to be hypotensive with increasing respiratory failure requiring 4-5 L oxygen supplementation, chest x-ray with infiltrates  Patient was started on septic protocol, given 4 L of IV fluid hydration as well as initiated on IV antibiotics, UA is negative  Patient does not recall events of last evening, patient feels mild low back pain, patient does report dry cough, still on 4 to 5 L, taper and oxygen needs  Encourage activity    1/28 TAKING OVER CARE  Afebrile, normal HR/RR, 's-140's, SpO2 95-97% on 2 L NC.  No leukocytosis, stable anemia, mild thrombocytopenia.  LFT's improving, blood cultures remain negative.  Repeat procalcitonin in the morning, check CRP, CBC.  Suspect will be de-escalating antibiotics tomorrow.  Feeling general fatigue.  Tolerating p.o. intake.    1/29  Looking a bit better today.  Afebrile normal pulse and respiratory rate normal blood pressure, weaned off of supplemental oxygen today.  Procalcitonin checked from 30 down to 16, continue antibiotics for now.  Check magnesium, 1.3, IV replacement ordered.  Having some aches and pains, analgesia adjusted.  Tolerating p.o. intake.    I have discussed this patient's plan of care and discharge plan at IDT rounds today with Case Management, Nursing, Nursing leadership, and other members of the IDT team.    Consultants/Specialty  None at this time     Code Status  Full Code    Disposition  The patient is not medically cleared for discharge to home or a post-acute facility.      I have placed the appropriate orders for post-discharge needs.    Review of Systems  Review of Systems   Constitutional:  Positive for malaise/fatigue. Negative for chills, diaphoresis and fever.   Eyes:  Negative for blurred vision and double vision.   Respiratory:  Positive for cough and shortness of breath. Negative for sputum production and wheezing.     Cardiovascular:  Negative for palpitations.   Gastrointestinal:  Negative for abdominal pain and nausea.   Genitourinary:  Negative for dysuria.   Musculoskeletal:  Positive for back pain and myalgias.   Neurological:  Positive for weakness. Negative for dizziness and headaches.   Psychiatric/Behavioral:  Negative for depression. The patient is not nervous/anxious.         Physical Exam  Temp:  [36.3 °C (97.4 °F)-37 °C (98.6 °F)] 36.4 °C (97.6 °F)  Pulse:  [80-98] 98  Resp:  [16-18] 17  BP: (109-151)/(65-83) 134/81  SpO2:  [91 %-98 %] 93 %    Physical Exam  Vitals and nursing note reviewed.   Constitutional:       General: He is not in acute distress.     Appearance: Normal appearance. He is not toxic-appearing or diaphoretic.   HENT:      Mouth/Throat:      Mouth: Mucous membranes are moist.   Eyes:      Extraocular Movements: Extraocular movements intact.      Conjunctiva/sclera: Conjunctivae normal.   Cardiovascular:      Rate and Rhythm: Regular rhythm.      Pulses: Normal pulses.   Pulmonary:      Effort: Pulmonary effort is normal. No respiratory distress.      Breath sounds: No wheezing, rhonchi or rales.   Abdominal:      General: There is no distension.      Tenderness: There is no abdominal tenderness. There is no guarding or rebound.   Musculoskeletal:         General: No swelling or tenderness.      Cervical back: Normal range of motion. No rigidity.   Skin:     General: Skin is warm.      Coloration: Skin is not jaundiced.      Findings: No erythema.   Neurological:      Mental Status: He is alert and oriented to person, place, and time. Mental status is at baseline.      Cranial Nerves: No cranial nerve deficit.   Psychiatric:         Mood and Affect: Mood normal.         Behavior: Behavior normal.         Fluids    Intake/Output Summary (Last 24 hours) at 1/30/2025 0406  Last data filed at 1/30/2025 0337  Gross per 24 hour   Intake 1260 ml   Output 1500 ml   Net -240 ml        Laboratory  Recent  Labs     01/28/25  0631 01/29/25  0924   WBC 5.6 5.4   RBC 3.39* 3.59*   HEMOGLOBIN 8.7* 9.3*   HEMATOCRIT 27.4* 29.4*   MCV 80.8* 81.9   MCH 25.7* 25.9*   MCHC 31.8* 31.6*   RDW 40.8 40.8   PLATELETCT 144* 161*   MPV 9.2 9.1     Recent Labs     01/28/25  0631 01/29/25  0924   SODIUM 139 138   POTASSIUM 4.2 3.9   CHLORIDE 102 96   CO2 30 31   GLUCOSE 169* 155*   BUN 21 14   CREATININE 0.74 0.60   CALCIUM 8.3* 8.5                     Imaging  US-RUQ   Final Result      1.  Distended gallbladder with gallbladder sludge and a small calcified gallstone. No discrete sonographic evidence of acute cholecystitis.   2.  Mildly dilated main portal vein which could indicate portal hypertension      DX-CHEST-PORTABLE (1 VIEW)   Final Result      Left basilar atelectasis. Underlying infection is possible.      MR-LUMBAR SPINE-W/O   Final Result      1.  T12 chronic compression fracture with anterior wedge deformity.   2.  Postoperative changes with interval L4-5 laminectomy since prior MRI study. There is now laminectomy at L4-5-S1 and there is now posterior fusion with transpedicular screw fixation at L4-L5. Relief of previously seen severe central stenosis at L4-5.   3.  Multilevel degenerative changes as detailed for each level above in the body of report.      MR-THORACIC SPINE-W/O   Final Result      1.  T12 old compression fracture with anterior wedge deformity. Associated mild lower thoracic kyphosis.   2.  T2 old superior endplate compression fracture.   3.  Multilevel disc bulges T3-T4, T7-T8, T8-T9, T11-12, T12-L1. Variable mass effect on the ventral thecal sac without quinton central stenosis or cord impingement at any thoracic level.   4.  No myelopathic cord signal at any thoracic level.      DX-CHEST-PORTABLE (1 VIEW)   Final Result      No acute cardiopulmonary disease evident.           Assessment/Plan  * Compression fracture of body of thoracic vertebra (HCC)- (present on admission)  Assessment & Plan  MRI T-spine  with T12 old compression fracture, T2 old superior endplate compression fracture, multiple level disc bulges T3-4 T7-T8 T8-T9, T11-T12, variable mass effect with out  central stenosis or cord impingement, MRI L-spine with T12 compression fracture, previous L4-L5 laminectomy, multilevel degenerative changes.  - Neurosurgery has been consulted  - Recommending non operative management at this time  - Continue to follow-up outpatient for possible epidural  - Continue gabapentin, lidocaine and oxycodone as needed for pain management  - PT OT recommending postacute rehabilitation  - I personally reviewed MRI images with patient at bedside 1/16 1/21 placement pending, f/u with CM  - ongoing LBP, schedule flexeril    1/22 PT reeval with AFO now available  Pain control, encourage activity, placement pending    1/23 PT  with AFO, encourage activity, pending placement, CM to assist, ? GH  1/24 PT to work with patient with AFO, consult replaced  Encourage activity    1/25 patient worked with PT with AFO, encourage activity    1/26 cont activity, placement pending    1/27 difficult discharge,  assisting  Considering transfer to another state to be closer to family, patient can no longer live alone, cannot self administer insulin or take care of himself  Encourage activity  Placement pending    Sepsis (HCC)  Assessment & Plan  This is Sepsis Not present on admission  SIRS criteria identified on my evaluation include: Tachycardia, with heart rate greater than 90 BPM and Tachypnea, with respirations greater than 20 per minute  Clinical indicators of end organ dysfunction include Hypotension with systolic blood pressure less than 90 or MAP less than 65, Lactic Acid greater than 2, and Acute Respiratory Failure - (mechanical ventilation or BiPap or PaO2/FiO2 ratio < 300)  Source is pneumonia/lungs  Sepsis protocol initiated  Crystalloid Fluid Administration: Fluid resuscitation ordered per standard protocol - 30 mL/kg  per current or ideal body weight  IV antibiotics as appropriate for source of sepsis  Reassessment: I have reassessed the patient's hemodynamic status    1/27 septic protocol initiated, received 4 L of fluid hydration, improved blood pressure, lactic acidosis resolved, will follow-up blood a.m. lactic acid level, repeat is at 2  Borderline hypotension, continue fluid hydration  Patient now on IV antibiotics, follow-up cultures  Taper oxygen as tolerated, chest x-ray concerning for infiltrates    Moderate episode of recurrent major depressive disorder (HCC)  Assessment & Plan  Patient endorsing depression.  Unwillingness to participate in his rehabilitation.  Endorses feelings of helplessness and hopelessness.  Per nursing staff he has expressed SI without a plan.  He does not endorse SI to me; on 2 separate occasions.  - Continue to encourage patient to participate in his rehabilitation.  - Increase venlafaxine to 75 mg  - Inpatient behavioral health referral has been placed.  - I do not see an indication to initiate a legal hold at this point.  Patient denies SI and SI plan to me.  Per nursing staff patient does become agitated when his snacks are withheld.  We will continue to monitor the situation closely.    Bacteriuria- (present on admission)  Assessment & Plan  No abx's for now given lack of symptoms    Physical debility- (present on admission)  Assessment & Plan  MRI with compression T2 and T12 fracture, multilevel bulging disc through thoracic spine.  No cord impingement.  Neurosurgery recommended nonoperative management  Follow-up for epidural injection as outpatient  Placement is pending    1/21 Patient reports ambulatory status prior to this admission, to follow-up with orthopedic surgery for AFO evaluation  , Will reconsult PT OT for AFO as needed, will consult orthopedic surgery as needed  -Patient reports low back pain, has Zanaflex as needed, will add Flexeril 3 times daily  Continue pain  control  Encourage activity    Acute hypoxic respiratory failure (HCC)- (present on admission)  Assessment & Plan  Resolved  Now on room air, continue to monitor oxygen requirement    Elevated troponin- (present on admission)  Assessment & Plan  Chronically elevated. Baseline  - no further workup    Uncontrolled type 2 diabetes mellitus with hyperglycemia (HCC)- (present on admission)  Assessment & Plan  A1C 9.8  - Continue Lantus to 25 units in the evening  - Continue sliding scale insulin; has required 2 units SSI in last 24 hours  - Patient states he is unable to drop insulin at home due to hand coordination issues.  We will try to minimize sliding scale insulin and discharged with only Lantus pens.  - Monitor BG trend.   - Accu-Cheks before meals and at bedtime.   - Goal to keep BG between 140-180 per 2019 ADA guidelines     1/21 hypoglycemia this am, decrease Lantus to 18 units, cont ssi  - f/u am labs    1/22 bs improved, on lantus 18 units, cont and monitor  ? GH    1/23 improvng bs, increase Lantus 20 u, add metformin, monitor    1/24 improving, cont lantus, f/u am bmp, consider increasing metformin    1/25 hypoglycemia this a.m., decreased metformin and Lantus, monitor closely    1/26 improved bs, now on lantus 10u, monitor, encourage activity    1/27 improved control, continue Lantus    CKD (chronic kidney disease)- (present on admission)  Assessment & Plan  Creatinine stable at 0.57  Stable, secondary to diabetes       VTE prophylaxis:    enoxaparin ppx      I have performed a physical exam and reviewed and updated ROS and Plan today (1/29/2025). In review of yesterday's note (1/28/2025), there are no changes except as documented above.  Total time spent 54 minutes. I spent greater than 50% of the time for patient care, counseling, and coordination on this date, including unit/floor time, and face-to-face time with the patient as per interval events, my own review of patient's imaging and lab analysis and  developing my assessment and plan above.

## 2025-01-30 NOTE — DISCHARGE PLANNING
Case Management Discharge Planning    Admission Date: 1/9/2025  GMLOS: 5.2  ALOS: 20    6-Clicks ADL Score: 18  6-Clicks Mobility Score: 20      Anticipated Discharge Dispo: Discharge Disposition: Discharged to home/self care (01)    This RN CM called Raeann with [facility] at [phone]    This RN CM called [facility] [phone] and asked for room and board to be resent to emails.     Assisted living program re locate for 60 days before he can qualify for establishment of residency.  The income share program assisted with reducing cost of care down for [facility]. Once patient qualifies for medi-judith he can apply for O thru [facility] thru [facility].  ALW program extended waiting period. Resolve income share of cost select [facility] or [facility].     This RN CM called sister Delia and updated on [facility] programs but that patient would need to spend down assets first in order to qualify. Delia updated that they have a meeting with a relator this weekend on Saturday to discuss selling the mobile home. [employer] Olmos is working on getting the car registered and sold. Goal is to have car sold next week for additional funds.     This RN CM sent assisted living facilities in [place] and [place] to email provided.

## 2025-01-30 NOTE — CARE PLAN
Problem: Pain Management  Goal: Pain level will decrease to patient's comfort goal  Outcome: Progressing   The patient is Stable - Low risk of patient condition declining or worsening    Shift Goals  Clinical Goals: pain will be managed to comfort level throughout shift.  Patient Goals: comfort and safety  Family Goals: KATHERINE    Progress made toward(s) clinical / shift goals:  pain managed with medication and content and comfortable.

## 2025-01-31 VITALS
HEART RATE: 82 BPM | DIASTOLIC BLOOD PRESSURE: 84 MMHG | BODY MASS INDEX: 22.25 KG/M2 | WEIGHT: 164.24 LBS | RESPIRATION RATE: 18 BRPM | OXYGEN SATURATION: 95 % | HEIGHT: 72 IN | SYSTOLIC BLOOD PRESSURE: 134 MMHG | TEMPERATURE: 97.4 F

## 2025-01-31 LAB
ANION GAP SERPL CALC-SCNC: 8 MMOL/L (ref 7–16)
BASOPHILS # BLD AUTO: 0.2 % (ref 0–1.8)
BASOPHILS # BLD: 0.01 K/UL (ref 0–0.12)
BUN SERPL-MCNC: 20 MG/DL (ref 8–22)
CALCIUM SERPL-MCNC: 8.6 MG/DL (ref 8.5–10.5)
CHLORIDE SERPL-SCNC: 99 MMOL/L (ref 96–112)
CO2 SERPL-SCNC: 34 MMOL/L (ref 20–33)
CREAT SERPL-MCNC: 0.67 MG/DL (ref 0.5–1.4)
EOSINOPHIL # BLD AUTO: 0.19 K/UL (ref 0–0.51)
EOSINOPHIL NFR BLD: 3.8 % (ref 0–6.9)
ERYTHROCYTE [DISTWIDTH] IN BLOOD BY AUTOMATED COUNT: 40.2 FL (ref 35.9–50)
GFR SERPLBLD CREATININE-BSD FMLA CKD-EPI: 106 ML/MIN/1.73 M 2
GLUCOSE BLD STRIP.AUTO-MCNC: 183 MG/DL (ref 65–99)
GLUCOSE BLD STRIP.AUTO-MCNC: 213 MG/DL (ref 65–99)
GLUCOSE BLD STRIP.AUTO-MCNC: 214 MG/DL (ref 65–99)
GLUCOSE BLD STRIP.AUTO-MCNC: 226 MG/DL (ref 65–99)
GLUCOSE SERPL-MCNC: 212 MG/DL (ref 65–99)
HCT VFR BLD AUTO: 31 % (ref 42–52)
HGB BLD-MCNC: 9.7 G/DL (ref 14–18)
IMM GRANULOCYTES # BLD AUTO: 0.02 K/UL (ref 0–0.11)
IMM GRANULOCYTES NFR BLD AUTO: 0.4 % (ref 0–0.9)
LYMPHOCYTES # BLD AUTO: 1.06 K/UL (ref 1–4.8)
LYMPHOCYTES NFR BLD: 21.2 % (ref 22–41)
MAGNESIUM SERPL-MCNC: 1.8 MG/DL (ref 1.5–2.5)
MCH RBC QN AUTO: 25.2 PG (ref 27–33)
MCHC RBC AUTO-ENTMCNC: 31.3 G/DL (ref 32.3–36.5)
MCV RBC AUTO: 80.5 FL (ref 81.4–97.8)
MONOCYTES # BLD AUTO: 0.43 K/UL (ref 0–0.85)
MONOCYTES NFR BLD AUTO: 8.6 % (ref 0–13.4)
NEUTROPHILS # BLD AUTO: 3.28 K/UL (ref 1.82–7.42)
NEUTROPHILS NFR BLD: 65.8 % (ref 44–72)
NRBC # BLD AUTO: 0 K/UL
NRBC BLD-RTO: 0 /100 WBC (ref 0–0.2)
PLATELET # BLD AUTO: 180 K/UL (ref 164–446)
PMV BLD AUTO: 9.1 FL (ref 9–12.9)
POTASSIUM SERPL-SCNC: 4.4 MMOL/L (ref 3.6–5.5)
RBC # BLD AUTO: 3.85 M/UL (ref 4.7–6.1)
SODIUM SERPL-SCNC: 141 MMOL/L (ref 135–145)
WBC # BLD AUTO: 5 K/UL (ref 4.8–10.8)

## 2025-01-31 PROCEDURE — 82962 GLUCOSE BLOOD TEST: CPT

## 2025-01-31 PROCEDURE — 83735 ASSAY OF MAGNESIUM: CPT

## 2025-01-31 PROCEDURE — 700102 HCHG RX REV CODE 250 W/ 637 OVERRIDE(OP): Performed by: NURSE PRACTITIONER

## 2025-01-31 PROCEDURE — A9270 NON-COVERED ITEM OR SERVICE: HCPCS | Performed by: STUDENT IN AN ORGANIZED HEALTH CARE EDUCATION/TRAINING PROGRAM

## 2025-01-31 PROCEDURE — 700102 HCHG RX REV CODE 250 W/ 637 OVERRIDE(OP): Performed by: STUDENT IN AN ORGANIZED HEALTH CARE EDUCATION/TRAINING PROGRAM

## 2025-01-31 PROCEDURE — 700102 HCHG RX REV CODE 250 W/ 637 OVERRIDE(OP): Performed by: INTERNAL MEDICINE

## 2025-01-31 PROCEDURE — 700111 HCHG RX REV CODE 636 W/ 250 OVERRIDE (IP): Mod: JZ | Performed by: INTERNAL MEDICINE

## 2025-01-31 PROCEDURE — 36415 COLL VENOUS BLD VENIPUNCTURE: CPT

## 2025-01-31 PROCEDURE — 700102 HCHG RX REV CODE 250 W/ 637 OVERRIDE(OP)

## 2025-01-31 PROCEDURE — A9270 NON-COVERED ITEM OR SERVICE: HCPCS | Performed by: NURSE PRACTITIONER

## 2025-01-31 PROCEDURE — 80048 BASIC METABOLIC PNL TOTAL CA: CPT

## 2025-01-31 PROCEDURE — 770006 HCHG ROOM/CARE - MED/SURG/GYN SEMI*

## 2025-01-31 PROCEDURE — 700101 HCHG RX REV CODE 250: Performed by: NURSE PRACTITIONER

## 2025-01-31 PROCEDURE — A9270 NON-COVERED ITEM OR SERVICE: HCPCS

## 2025-01-31 PROCEDURE — 85025 COMPLETE CBC W/AUTO DIFF WBC: CPT

## 2025-01-31 PROCEDURE — A9270 NON-COVERED ITEM OR SERVICE: HCPCS | Performed by: INTERNAL MEDICINE

## 2025-01-31 PROCEDURE — 99232 SBSQ HOSP IP/OBS MODERATE 35: CPT | Performed by: STUDENT IN AN ORGANIZED HEALTH CARE EDUCATION/TRAINING PROGRAM

## 2025-01-31 RX ADMIN — GABAPENTIN 200 MG: 100 CAPSULE ORAL at 04:16

## 2025-01-31 RX ADMIN — ENOXAPARIN SODIUM 40 MG: 100 INJECTION SUBCUTANEOUS at 19:03

## 2025-01-31 RX ADMIN — INSULIN LISPRO 1 UNITS: 100 INJECTION, SOLUTION INTRAVENOUS; SUBCUTANEOUS at 19:03

## 2025-01-31 RX ADMIN — ACETAMINOPHEN 650 MG: 325 TABLET ORAL at 12:26

## 2025-01-31 RX ADMIN — OMEPRAZOLE 40 MG: 20 CAPSULE, DELAYED RELEASE ORAL at 04:16

## 2025-01-31 RX ADMIN — OXYCODONE 5 MG: 5 TABLET ORAL at 17:29

## 2025-01-31 RX ADMIN — LIDOCAINE 3 PATCH: 4 PATCH TOPICAL at 17:30

## 2025-01-31 RX ADMIN — INSULIN LISPRO 2 UNITS: 100 INJECTION, SOLUTION INTRAVENOUS; SUBCUTANEOUS at 08:14

## 2025-01-31 RX ADMIN — OXYCODONE 5 MG: 5 TABLET ORAL at 08:21

## 2025-01-31 RX ADMIN — GABAPENTIN 200 MG: 100 CAPSULE ORAL at 19:02

## 2025-01-31 RX ADMIN — OMEPRAZOLE 40 MG: 20 CAPSULE, DELAYED RELEASE ORAL at 19:03

## 2025-01-31 RX ADMIN — INSULIN LISPRO 2 UNITS: 100 INJECTION, SOLUTION INTRAVENOUS; SUBCUTANEOUS at 17:33

## 2025-01-31 RX ADMIN — GABAPENTIN 200 MG: 100 CAPSULE ORAL at 12:18

## 2025-01-31 RX ADMIN — VENLAFAXINE HYDROCHLORIDE 75 MG: 75 TABLET ORAL at 04:16

## 2025-01-31 RX ADMIN — AMOXICILLIN AND CLAVULANATE POTASSIUM 1 TABLET: 875; 125 TABLET, FILM COATED ORAL at 19:03

## 2025-01-31 RX ADMIN — INSULIN LISPRO 2 UNITS: 100 INJECTION, SOLUTION INTRAVENOUS; SUBCUTANEOUS at 12:18

## 2025-01-31 RX ADMIN — AMOXICILLIN AND CLAVULANATE POTASSIUM 1 TABLET: 875; 125 TABLET, FILM COATED ORAL at 04:16

## 2025-01-31 RX ADMIN — OXYCODONE 5 MG: 5 TABLET ORAL at 00:30

## 2025-01-31 ASSESSMENT — ENCOUNTER SYMPTOMS
NAUSEA: 0
WEAKNESS: 1
MYALGIAS: 1
DOUBLE VISION: 0
WHEEZING: 0
SHORTNESS OF BREATH: 1
PALPITATIONS: 0
NERVOUS/ANXIOUS: 0
ABDOMINAL PAIN: 0
BACK PAIN: 1
CHILLS: 0
DEPRESSION: 0
DIAPHORESIS: 0
BLURRED VISION: 0
SPUTUM PRODUCTION: 0
FEVER: 0
DIZZINESS: 0
COUGH: 1
HEADACHES: 0

## 2025-01-31 ASSESSMENT — PAIN DESCRIPTION - PAIN TYPE
TYPE: ACUTE PAIN

## 2025-01-31 NOTE — PROGRESS NOTES
Hospital Medicine Daily Progress Note    Date of Service  1/30/2025    Chief Complaint  Christoph Taylor is a 59 y.o. male admitted 1/9/2025 with weakness.    Hospital Course  Christoph Taylor is a 59-year-old male with prior suicidal ideation, poorly controlled diabetes mellitus type 2, housing insecurity and severe protein calorie malnutrition.  Admitted 1/9 for acute on chronic back pain.    Patient states that he is unable to take care of himself and has a hard time administering insulin, buying food for himself, taking his blood sugars. Additionally says that he has worsening neuropathy of the feet and hands. His pain is worse than ever. He has no additional help here.    In the ER he was found to be hyperglycemic and quite weak.  UA was concerning for possible UTI.  Patient was given 1 dose of IV cefazolin.  However, he denied urinary symptoms and antibiotics were not continued.     In terms of patient's weakness-MRI thoracic and lumbar notable for old compression fracture at T12 and multiple disc bulging without quinton central canal stenosis or cord impingement.  Neurosurgery was consulted.  Dr. Johnson recommending non operative management and outpatient follow-up for possible epidural injection.  PT OT recommending postacute rehabilitation.  However, no accepting facilities were identified.  Patient intermittently refusing to work with PT here.    Home health has been arranged prior to discharge.    Interval Problem Update  1/14: Vitals stable overnight.  -129.  Patient is medically clear for discharge pending placement.    1/15: Vitals remained stable.   through 156.  Glucose ranging 104 through 246.  Case management is working hard to obtain patient's insurance card to evaluate benefits for postacute rehabilitation.  Patient is medically cleared to discharge once insurance barrier has been resolved.    1/16: Vitals stable.  Glucose this a.m. 176.  Range 2 29 through 3 05.  Increase Lantus to  25 units in the p.m.  Multiple long discussions with patient, case management and nursing staff regarding discharge plan.  Patient feels unsafe to discharge to his home.  He has 5 steps.  He does not have a ramp.  He is largely wheelchair-bound due to chronic pain.    1/17: Vital stable overnight.  -130.  Glucose improved with increase Lantus dosing.  A.m. blood sugar 104.  Patient is now being followed by complex discharge committee.    1/18: Vitals notable for SBP .  Glucose ranging 125 through 196.  Patient required 1 unit sliding scale insulin yesterday.  Patient remains medically clear for discharge though a safe discharge plan has not been arranged yet.  Encouraged ambulation at least 3 times daily.    1/19: Vital stable overnight.   through 126.  Glucose range .  Required 2 units SSI over last 24 hours.    1/20: Vitals stable overnight.  Patient endorsing depression and refusal to participate in medical therapies.  I have placed a referral for inpatient psychology/behavioral health.  Increase venlafaxine to 75 mg.  Continue to encourage participation in ambulation and patient rehabilitation.    1/21 Patient able to self transfer to chair, reports ongoing low back pain, pain level of 7/10  States that he is now wheelchair-bound, pending placement,  to assist  Patient with hypoglycemic episode this a.m. was given juice, unable to self administer insulin at home, will need placement assistance    1/22 no new events, sleeping arousable, AFO delivered for PT reeval    1/23 reports low back pain, able to transfer to chair, PT blanca    1/24 ongoing lbp, min activity, Has not worked with PT with AFO, to reconsult  Improving blood sugar    1/25 patient with hypoglycemia this morning, blood sugar of 69, arousable improved after dextrose administration, Lantus decreased patient did work with PT with AFO, encourage activity    1/26 improved blood sugar, patient sitting up in chair, no  new complaints  Patient did have increased activity with AFO, continue to encourage activity    1/27 patient was noted to be hypotensive with increasing respiratory failure requiring 4-5 L oxygen supplementation, chest x-ray with infiltrates  Patient was started on septic protocol, given 4 L of IV fluid hydration as well as initiated on IV antibiotics, UA is negative  Patient does not recall events of last evening, patient feels mild low back pain, patient does report dry cough, still on 4 to 5 L, taper and oxygen needs  Encourage activity    1/28 TAKING OVER CARE  Afebrile, normal HR/RR, 's-140's, SpO2 95-97% on 2 L NC.  No leukocytosis, stable anemia, mild thrombocytopenia.  LFT's improving, blood cultures remain negative.  Repeat procalcitonin in the morning, check CRP, CBC.  Suspect will be de-escalating antibiotics tomorrow.  Feeling general fatigue.  Tolerating p.o. intake.    1/29  Looking a bit better today.  Afebrile normal pulse and respiratory rate normal blood pressure, weaned off of supplemental oxygen today.  Procalcitonin checked from 30 down to 16, continue antibiotics for now.  Check magnesium, 1.3, IV replacement ordered.  Having some aches and pains, analgesia adjusted.  Tolerating p.o. intake.    1/30  Afebrile with normal vitals, weaned down to room air.  Sitting up edge of bed eating his meal, no acute complaints, tolerating p.o. intake.  Will work with pharmacy to see about ordering him a continuous glucose monitor as well as insulin pump to help alleviate any insulin administration concerns holding him up from getting into a group home.  Patient was interested in this idea as well.    I have discussed this patient's plan of care and discharge plan at IDT rounds today with Case Management, Nursing, Nursing leadership, and other members of the IDT team.    Consultants/Specialty  None at this time     Code Status  Full Code    Disposition  The patient is not medically cleared for  discharge to home or a post-acute facility.      I have placed the appropriate orders for post-discharge needs.    Review of Systems  Review of Systems   Constitutional:  Positive for malaise/fatigue. Negative for chills, diaphoresis and fever.   Eyes:  Negative for blurred vision and double vision.   Respiratory:  Positive for cough and shortness of breath. Negative for sputum production and wheezing.    Cardiovascular:  Negative for palpitations.   Gastrointestinal:  Negative for abdominal pain and nausea.   Genitourinary:  Negative for dysuria.   Musculoskeletal:  Positive for back pain and myalgias.   Neurological:  Positive for weakness. Negative for dizziness and headaches.   Psychiatric/Behavioral:  Negative for depression. The patient is not nervous/anxious.         Physical Exam  Temp:  [36.4 °C (97.6 °F)-36.9 °C (98.4 °F)] 36.4 °C (97.6 °F)  Pulse:  [86-90] 90  Resp:  [16-18] 16  BP: (105-124)/(63-67) 110/65  SpO2:  [92 %-94 %] 92 %    Physical Exam  Vitals and nursing note reviewed.   Constitutional:       General: He is not in acute distress.     Appearance: Normal appearance. He is not ill-appearing, toxic-appearing or diaphoretic.   HENT:      Mouth/Throat:      Mouth: Mucous membranes are moist.      Pharynx: Oropharynx is clear.   Eyes:      General: No scleral icterus.     Extraocular Movements: Extraocular movements intact.      Conjunctiva/sclera: Conjunctivae normal.   Cardiovascular:      Rate and Rhythm: Regular rhythm.      Pulses: Normal pulses.   Pulmonary:      Effort: Pulmonary effort is normal. No respiratory distress.      Breath sounds: No wheezing, rhonchi or rales.   Abdominal:      General: There is no distension.      Tenderness: There is no abdominal tenderness. There is no guarding or rebound.   Musculoskeletal:         General: No swelling or tenderness.      Cervical back: Normal range of motion. No rigidity.   Skin:     General: Skin is warm.      Coloration: Skin is not  jaundiced.      Findings: No erythema.   Neurological:      Mental Status: He is alert and oriented to person, place, and time. Mental status is at baseline.      Cranial Nerves: No cranial nerve deficit.   Psychiatric:         Mood and Affect: Mood normal.         Behavior: Behavior normal.         Thought Content: Thought content normal.         Judgment: Judgment normal.         Fluids    Intake/Output Summary (Last 24 hours) at 1/31/2025 0354  Last data filed at 1/31/2025 0034  Gross per 24 hour   Intake 840 ml   Output 1600 ml   Net -760 ml        Laboratory  Recent Labs     01/28/25  0631 01/29/25  0924   WBC 5.6 5.4   RBC 3.39* 3.59*   HEMOGLOBIN 8.7* 9.3*   HEMATOCRIT 27.4* 29.4*   MCV 80.8* 81.9   MCH 25.7* 25.9*   MCHC 31.8* 31.6*   RDW 40.8 40.8   PLATELETCT 144* 161*   MPV 9.2 9.1     Recent Labs     01/28/25  0631 01/29/25  0924   SODIUM 139 138   POTASSIUM 4.2 3.9   CHLORIDE 102 96   CO2 30 31   GLUCOSE 169* 155*   BUN 21 14   CREATININE 0.74 0.60   CALCIUM 8.3* 8.5                     Imaging  US-RUQ   Final Result      1.  Distended gallbladder with gallbladder sludge and a small calcified gallstone. No discrete sonographic evidence of acute cholecystitis.   2.  Mildly dilated main portal vein which could indicate portal hypertension      DX-CHEST-PORTABLE (1 VIEW)   Final Result      Left basilar atelectasis. Underlying infection is possible.      MR-LUMBAR SPINE-W/O   Final Result      1.  T12 chronic compression fracture with anterior wedge deformity.   2.  Postoperative changes with interval L4-5 laminectomy since prior MRI study. There is now laminectomy at L4-5-S1 and there is now posterior fusion with transpedicular screw fixation at L4-L5. Relief of previously seen severe central stenosis at L4-5.   3.  Multilevel degenerative changes as detailed for each level above in the body of report.      MR-THORACIC SPINE-W/O   Final Result      1.  T12 old compression fracture with anterior wedge  deformity. Associated mild lower thoracic kyphosis.   2.  T2 old superior endplate compression fracture.   3.  Multilevel disc bulges T3-T4, T7-T8, T8-T9, T11-12, T12-L1. Variable mass effect on the ventral thecal sac without quinton central stenosis or cord impingement at any thoracic level.   4.  No myelopathic cord signal at any thoracic level.      DX-CHEST-PORTABLE (1 VIEW)   Final Result      No acute cardiopulmonary disease evident.           Assessment/Plan  * Compression fracture of body of thoracic vertebra (HCC)- (present on admission)  Assessment & Plan  MRI T-spine with T12 old compression fracture, T2 old superior endplate compression fracture, multiple level disc bulges T3-4 T7-T8 T8-T9, T11-T12, variable mass effect with out  central stenosis or cord impingement, MRI L-spine with T12 compression fracture, previous L4-L5 laminectomy, multilevel degenerative changes.  - Neurosurgery has been consulted  - Recommending non operative management at this time  - Continue to follow-up outpatient for possible epidural  - Continue gabapentin, lidocaine and oxycodone as needed for pain management  - PT OT recommending postacute rehabilitation  - I personally reviewed MRI images with patient at bedside 1/16 1/21 placement pending, f/u with CM  - ongoing LBP, schedule flexeril    1/22 PT reeval with AFO now available  Pain control, encourage activity, placement pending    1/23 PT  with AFO, encourage activity, pending placement, CM to assist, ? GH  1/24 PT to work with patient with AFO, consult replaced  Encourage activity    1/25 patient worked with PT with AFO, encourage activity    1/26 cont activity, placement pending    1/27 difficult discharge,  assisting  Considering transfer to another state to be closer to family, patient can no longer live alone, cannot self administer insulin or take care of himself  Encourage activity  Placement pending    Sepsis (HCC)  Assessment & Plan  This is Sepsis  Not present on admission  SIRS criteria identified on my evaluation include: Tachycardia, with heart rate greater than 90 BPM and Tachypnea, with respirations greater than 20 per minute  Clinical indicators of end organ dysfunction include Hypotension with systolic blood pressure less than 90 or MAP less than 65, Lactic Acid greater than 2, and Acute Respiratory Failure - (mechanical ventilation or BiPap or PaO2/FiO2 ratio < 300)  Source is pneumonia/lungs  Sepsis protocol initiated  Crystalloid Fluid Administration: Fluid resuscitation ordered per standard protocol - 30 mL/kg per current or ideal body weight  IV antibiotics as appropriate for source of sepsis  Reassessment: I have reassessed the patient's hemodynamic status    1/27 septic protocol initiated, received 4 L of fluid hydration, improved blood pressure, lactic acidosis resolved, will follow-up blood a.m. lactic acid level, repeat is at 2  Borderline hypotension, continue fluid hydration  Patient now on IV antibiotics, follow-up cultures  Taper oxygen as tolerated, chest x-ray concerning for infiltrates    Moderate episode of recurrent major depressive disorder (HCC)  Assessment & Plan  Patient endorsing depression.  Unwillingness to participate in his rehabilitation.  Endorses feelings of helplessness and hopelessness.  Per nursing staff he has expressed SI without a plan.  He does not endorse SI to me; on 2 separate occasions.  - Continue to encourage patient to participate in his rehabilitation.  - Increase venlafaxine to 75 mg  - Inpatient behavioral health referral has been placed.  - I do not see an indication to initiate a legal hold at this point.  Patient denies SI and SI plan to me.  Per nursing staff patient does become agitated when his snacks are withheld.  We will continue to monitor the situation closely.    Bacteriuria- (present on admission)  Assessment & Plan  No abx's for now given lack of symptoms    Physical debility- (present on  admission)  Assessment & Plan  MRI with compression T2 and T12 fracture, multilevel bulging disc through thoracic spine.  No cord impingement.  Neurosurgery recommended nonoperative management  Follow-up for epidural injection as outpatient  Placement is pending    1/21 Patient reports ambulatory status prior to this admission, to follow-up with orthopedic surgery for AFO evaluation  , Will reconsult PT OT for AFO as needed, will consult orthopedic surgery as needed  -Patient reports low back pain, has Zanaflex as needed, will add Flexeril 3 times daily  Continue pain control  Encourage activity    Acute hypoxic respiratory failure (HCC)- (present on admission)  Assessment & Plan  Resolved  Now on room air, continue to monitor oxygen requirement    Elevated troponin- (present on admission)  Assessment & Plan  Chronically elevated. Baseline  - no further workup    Uncontrolled type 2 diabetes mellitus with hyperglycemia (HCC)- (present on admission)  Assessment & Plan  A1C 9.8  - Continue Lantus to 25 units in the evening  - Continue sliding scale insulin; has required 2 units SSI in last 24 hours  - Patient states he is unable to drop insulin at home due to hand coordination issues.  We will try to minimize sliding scale insulin and discharged with only Lantus pens.  - Monitor BG trend.   - Accu-Cheks before meals and at bedtime.   - Goal to keep BG between 140-180 per 2019 ADA guidelines     1/21 hypoglycemia this am, decrease Lantus to 18 units, cont ssi  - f/u am labs    1/22 bs improved, on lantus 18 units, cont and monitor  ? GH    1/23 improvng bs, increase Lantus 20 u, add metformin, monitor    1/24 improving, cont lantus, f/u am bmp, consider increasing metformin    1/25 hypoglycemia this a.m., decreased metformin and Lantus, monitor closely    1/26 improved bs, now on lantus 10u, monitor, encourage activity    1/27 improved control, continue Lantus    CKD (chronic kidney disease)- (present on  admission)  Assessment & Plan  Creatinine stable at 0.57  Stable, secondary to diabetes       VTE prophylaxis:    enoxaparin ppx      I have performed a physical exam and reviewed and updated ROS and Plan today (1/30/2025). In review of yesterday's note (1/29/2025), there are no changes except as documented above.  Total time spent 52 minutes. I spent greater than 50% of the time for patient care, counseling, and coordination on this date, including unit/floor time, and face-to-face time with the patient as per interval events, my own review of patient's imaging and lab analysis and developing my assessment and plan above.

## 2025-01-31 NOTE — CARE PLAN
The patient is Stable - Low risk of patient condition declining or worsening    Shift Goals  Clinical Goals: Patient's pain will be <5 throughout shift  Patient Goals: Pain mgt and sleep  Family Goals: KATHERINE    Progress made toward(s) clinical / shift goals:    Patient is alert and oriented x4, able to communicate needs and calls appropriately. Patient's pain is controlled with medication, rest, warm pack and repositioning. Patient's bed alarm is on, bed in low position, hourly rounding done and call light within reach.      Problem: Safety  Goal: Will remain free from injury  Outcome: Progressing     Problem: Safety  Goal: Will remain free from falls  Outcome: Progressing     Problem: Mobility  Goal: Patient's capacity to carry out activities will improve  Description: Target End Date:  Prior to discharge or change in level of care    Outcome: Progressing     Problem: Skin Integrity  Goal: Risk for impaired skin integrity will decrease  Outcome: Progressing

## 2025-01-31 NOTE — CONSULTS
"[facility]    INPATIENT ASSESSMENT    Name: Christoph Taylor  MRN: [id number]  : 1965  Age: 59 y.o.  Date of assessment: 2025  PCP: YESY Tariq.  Persons in attendance: Patient    HPI: Per Medical Record: \"Christoph Taylor is a 59 y.o. male with history of chronic upper abdominal pain uncontrolled diabetes with DKA's, CKD 3, kidney stones, horseshoe kidney, esophagitis, who presented 2024 with ongoing upper abdominal pain on and off associated with nausea and occasional vomiting.  He presented with the same complaints yesterday and was evaluated to be CT of the abdomen with contrast that showed possible esophagitis, distended urinary bladder and 5 mm nonobstructive kidney stone.  He reported suicidal ideations yesterday, but denies any ideations or plans today  During observation in ER he transiently desaturated to 83% and was placed on 2 L nasal cannula.  Blood work showed WBC 14.2.  COVID-19/influenza negative.  Chest x-ray: Negative.  CT of pulmonary artery was ordered  Blood sugar is in the 400s.  Patient stated he skipped 3 days of Lantus.  Bladder scan revealed 1 L of urine and I ordered Crowley catheter  Treatment in the ER included ceftriaxone and azithromycin for possible early pneumonia, 1 L of Ringer lactate and Tylenol 1 g.  Of note, patient denies cough or shortness of breath.\"  Behavioral Health continues to follow to provide support and psychotherapy.    Psychotherapy Session Summary: Patient is a 59 year old male seen lying in hospital bed sleeping but easy to arouse. Patient is alert and oriented with clear and coherent speech. Patient held limited eye contact throughout session and spent a majority of psychotherapy session looking out the hospital room window. Patient was engaged in psychotherapy session and was willing to respond to questions today. Patient showed no signs of a thought disorder.  Patient reports intermittently sleeping throughout the day " "and night. Patient attributes difficulty sleeping to severe pain in his back and feeling dysregulated being in a hospital. Clinician followed up with patient regarding his PT treatment. Patient states he had one session a few days ago and was given some exercises he can do while in the hospital bed. Clinician encouraged patient to continue work with physical therapist to help him achieve his long-term goal of walking and being more independent.  Patient reports feelings of isolation due to his physical condition. Patient reports a lack of interpersonal relationships, including limited communication with his three children. Patient reports his last long-term relationship being with his second wife from the years of [phone]. Patient states he has gone on dates \"here and there\", but feels like \"broken goods\" because of his health condition. Clinician encouraged patient to seek connection, stating he is deserving of love and support no matter his physical condition. Patient said he would \"think about this\".  Additionally, patient expressed feelings of hopelessness due to his pain and physical condition.   Patient has recurrent negative thought patterns regarding his treatment, social life, and physical condition. Clinician validated the difficult feelings of being in continuous pain and treatment to build therapeutic alliance. Clinician used problem solving therapy techniques to help patient work towards goals with his physical and emotional condition. Clinician helped patient explore solutions to his problems and encouraged patient to find activities he can enjoy in the present moment despite his physical limitations.     Chief Complaint   Patient presents with    Weakness     Increasing weakness last week, decreased dexterity with upper extremities baseline, thoracic back pain and worsening last x4 days, prior back surgeries, Rx Gabapentin    High Blood Sugar     With , has not been able to give self insulin " last x4 days, at ER POC glucose 382        CURRENT LIVING SITUATION/SOCIAL SUPPORT: Patient lives alone. Patient has three children who reside in [place]. Patient has two ex-wives with whom he has no contact with.     BEHAVIORAL HEALTH TREATMENT HISTORY  Does patient/parent report a history of prior behavioral health treatment for patient?   No:    SAFETY ASSESSMENT - SELF  Does patient acknowledge current or past symptoms of dangerousness to self? no  Does parent/significant other report patient has current or past symptoms of dangerousness to self? N\A  Does presenting problem suggest symptoms of dangerousness to self? No    SAFETY ASSESSMENT - OTHERS  Does patient acknowledge current or past symptoms of aggressive behavior or risk to others? no  Does parent/significant other report patient has current or past symptoms of aggressive behavior or risk to others?  N\A  Does presenting problem suggest symptoms of dangerousness to others? No    Crisis Safety Plan completed and copy given to patient? N\A    ABUSE/NEGLECT SCREENING  Does patient report feeling “unsafe” in his/her home, or afraid of anyone?  no  Does patient report any history of physical, sexual, or emotional abuse?  no  Does parent or significant other report any of the above? N\A  Is there evidence of neglect by self?  no  Is there evidence of neglect by a caregiver? no  Does the patient/parent report any history of CPS/APS/police involvement related to suspected abuse/neglect or domestic violence? no  Based on the information provided during the current assessment, is a mandated report of suspected abuse/neglect being made?  No    SUBSTANCE USE SCREENING  Yes:  Merlin all substances used in the past 30 days:      Last Use Amount   []   Alcohol     []   Marijuana     []   Heroin     []   Prescription Opioids  (used without prescription, for    recreation, or in excess of prescribed amount)     []   Other Prescription  (used without prescription, for    " recreation, or in excess of prescribed amount)     []   Cocaine      []   Methamphetamine     []   \"\" drugs (ectasy, MDMA)     []   Other substances        UDS results: Not assessed  Breathalyzer results: Not assessed    What consequences does the patient associate with any of the above substance use and or addictive behaviors? None    Risk factors for detox (check all that apply):  []  Seizures   []  Diaphoretic (sweating)   []  Tremors   []  Hallucinations   []  Increased blood pressure   []  Decreased blood pressure   []  Other   []  None      [] Patient education on risk factors for detoxification and instructed to return to ER as needed.      MENTAL STATUS              Participation: Active verbal participation  Grooming: Casual  Orientation: Alert  Behavior: Agitated  Eye contact: Limited  Mood: Depressed  Affect: Blunted  Thought process: Circumstantial  Thought content: Within normal limits  Speech: Rate within normal limits  Perception: Within normal limits  Memory:  Recent:  Adequate  Insight: Adequate  Judgment:  Limited  Other:    Collateral information:   Source:   Significant other present in person:    Significant other by telephone   [facility]   [facility] Record: Reviewed   Other:      Unable to complete full assessment due to:   Acute intoxication   Patient declined to participate/engage   Patient verbally unresponsive   Significant cognitive deficits   Significant perceptual distortions or behavioral disorganization   Other:             CLINICAL IMPRESSIONS:  Primary:  Major Depressive Disorder  Secondary:  Depression due to other medical condition                                       IDENTIFIED NEEDS/PLAN:  [Trigger DISPOSITION list for any items marked]      Imminent safety risk - self  Imminent safety risk - others     Acute substance withdrawal   Psychosis/Impaired reality testing    x Mood/anxiety   Substance use/Addictive behavior     " Maladaptive behavior   Parent/child conflict     Family/Couples conflict  x Biomedical     Housing   Financial      Legal  Occupational/Educational     Domestic violence   Other:       Legal Hold: N/A      Joon Anderson, Student  Farzana Tripathi, Ph.D., [facility]  1/31/2025   Length of Intervention: 45 minutes

## 2025-02-01 LAB
BACTERIA BLD CULT: NORMAL
BACTERIA BLD CULT: NORMAL
GLUCOSE BLD STRIP.AUTO-MCNC: 196 MG/DL (ref 65–99)
GLUCOSE BLD STRIP.AUTO-MCNC: 202 MG/DL (ref 65–99)
GLUCOSE BLD STRIP.AUTO-MCNC: 234 MG/DL (ref 65–99)
GLUCOSE BLD STRIP.AUTO-MCNC: 238 MG/DL (ref 65–99)
SIGNIFICANT IND 70042: NORMAL
SIGNIFICANT IND 70042: NORMAL
SITE SITE: NORMAL
SITE SITE: NORMAL
SOURCE SOURCE: NORMAL
SOURCE SOURCE: NORMAL

## 2025-02-01 PROCEDURE — 700111 HCHG RX REV CODE 636 W/ 250 OVERRIDE (IP): Mod: JZ | Performed by: INTERNAL MEDICINE

## 2025-02-01 PROCEDURE — A9270 NON-COVERED ITEM OR SERVICE: HCPCS | Performed by: STUDENT IN AN ORGANIZED HEALTH CARE EDUCATION/TRAINING PROGRAM

## 2025-02-01 PROCEDURE — 700101 HCHG RX REV CODE 250: Performed by: NURSE PRACTITIONER

## 2025-02-01 PROCEDURE — 82962 GLUCOSE BLOOD TEST: CPT | Mod: 91

## 2025-02-01 PROCEDURE — A9270 NON-COVERED ITEM OR SERVICE: HCPCS | Performed by: NURSE PRACTITIONER

## 2025-02-01 PROCEDURE — 700102 HCHG RX REV CODE 250 W/ 637 OVERRIDE(OP): Performed by: NURSE PRACTITIONER

## 2025-02-01 PROCEDURE — 770006 HCHG ROOM/CARE - MED/SURG/GYN SEMI*

## 2025-02-01 PROCEDURE — A9270 NON-COVERED ITEM OR SERVICE: HCPCS

## 2025-02-01 PROCEDURE — 700102 HCHG RX REV CODE 250 W/ 637 OVERRIDE(OP)

## 2025-02-01 PROCEDURE — 700102 HCHG RX REV CODE 250 W/ 637 OVERRIDE(OP): Performed by: INTERNAL MEDICINE

## 2025-02-01 PROCEDURE — 700102 HCHG RX REV CODE 250 W/ 637 OVERRIDE(OP): Performed by: STUDENT IN AN ORGANIZED HEALTH CARE EDUCATION/TRAINING PROGRAM

## 2025-02-01 PROCEDURE — A9270 NON-COVERED ITEM OR SERVICE: HCPCS | Performed by: INTERNAL MEDICINE

## 2025-02-01 RX ADMIN — OXYCODONE 5 MG: 5 TABLET ORAL at 15:20

## 2025-02-01 RX ADMIN — GABAPENTIN 200 MG: 100 CAPSULE ORAL at 04:44

## 2025-02-01 RX ADMIN — ENOXAPARIN SODIUM 40 MG: 100 INJECTION SUBCUTANEOUS at 17:32

## 2025-02-01 RX ADMIN — INSULIN LISPRO 2 UNITS: 100 INJECTION, SOLUTION INTRAVENOUS; SUBCUTANEOUS at 12:11

## 2025-02-01 RX ADMIN — ACETAMINOPHEN 650 MG: 325 TABLET ORAL at 11:59

## 2025-02-01 RX ADMIN — INSULIN LISPRO 2 UNITS: 100 INJECTION, SOLUTION INTRAVENOUS; SUBCUTANEOUS at 17:40

## 2025-02-01 RX ADMIN — GABAPENTIN 200 MG: 100 CAPSULE ORAL at 12:00

## 2025-02-01 RX ADMIN — OXYCODONE 5 MG: 5 TABLET ORAL at 22:29

## 2025-02-01 RX ADMIN — OXYCODONE 2.5 MG: 5 TABLET ORAL at 08:47

## 2025-02-01 RX ADMIN — INSULIN LISPRO 2 UNITS: 100 INJECTION, SOLUTION INTRAVENOUS; SUBCUTANEOUS at 08:44

## 2025-02-01 RX ADMIN — INSULIN LISPRO 1 UNITS: 100 INJECTION, SOLUTION INTRAVENOUS; SUBCUTANEOUS at 21:47

## 2025-02-01 RX ADMIN — LIDOCAINE 3 PATCH: 4 PATCH TOPICAL at 16:30

## 2025-02-01 RX ADMIN — GABAPENTIN 200 MG: 100 CAPSULE ORAL at 17:32

## 2025-02-01 RX ADMIN — OMEPRAZOLE 40 MG: 20 CAPSULE, DELAYED RELEASE ORAL at 04:44

## 2025-02-01 RX ADMIN — VENLAFAXINE HYDROCHLORIDE 75 MG: 75 TABLET ORAL at 04:44

## 2025-02-01 RX ADMIN — OMEPRAZOLE 40 MG: 20 CAPSULE, DELAYED RELEASE ORAL at 17:32

## 2025-02-01 ASSESSMENT — ENCOUNTER SYMPTOMS
HEADACHES: 0
WHEEZING: 0
DIAPHORESIS: 0
DEPRESSION: 0
FEVER: 0
DOUBLE VISION: 0
MYALGIAS: 1
WEAKNESS: 1
BACK PAIN: 1
ABDOMINAL PAIN: 0
PALPITATIONS: 0
CHILLS: 0
NERVOUS/ANXIOUS: 0
SHORTNESS OF BREATH: 1
COUGH: 1
DIZZINESS: 0
SPUTUM PRODUCTION: 0
BLURRED VISION: 0
NAUSEA: 0

## 2025-02-01 ASSESSMENT — PAIN DESCRIPTION - PAIN TYPE
TYPE: ACUTE PAIN

## 2025-02-01 NOTE — PROGRESS NOTES
Hospital Medicine Daily Progress Note    Date of Service  1/31/2025    Chief Complaint  Christoph Taylor is a 59 y.o. male admitted 1/9/2025 with weakness.    Hospital Course  Christoph Taylor is a 59-year-old male with prior suicidal ideation, poorly controlled diabetes mellitus type 2, housing insecurity and severe protein calorie malnutrition.  Admitted 1/9 for acute on chronic back pain.    Patient states that he is unable to take care of himself and has a hard time administering insulin, buying food for himself, taking his blood sugars. Additionally says that he has worsening neuropathy of the feet and hands. His pain is worse than ever. He has no additional help here.    In the ER he was found to be hyperglycemic and quite weak.  UA was concerning for possible UTI.  Patient was given 1 dose of IV cefazolin.  However, he denied urinary symptoms and antibiotics were not continued.     In terms of patient's weakness-MRI thoracic and lumbar notable for old compression fracture at T12 and multiple disc bulging without quinton central canal stenosis or cord impingement.  Neurosurgery was consulted.  Dr. Johnson recommending non operative management and outpatient follow-up for possible epidural injection.  PT OT recommending postacute rehabilitation.  However, no accepting facilities were identified.  Patient intermittently refusing to work with PT here.    Home health has been arranged prior to discharge.    Interval Problem Update  1/14: Vitals stable overnight.  -129.  Patient is medically clear for discharge pending placement.    1/15: Vitals remained stable.   through 156.  Glucose ranging 104 through 246.  Case management is working hard to obtain patient's insurance card to evaluate benefits for postacute rehabilitation.  Patient is medically cleared to discharge once insurance barrier has been resolved.    1/16: Vitals stable.  Glucose this a.m. 176.  Range 2 29 through 3 05.  Increase Lantus to  25 units in the p.m.  Multiple long discussions with patient, case management and nursing staff regarding discharge plan.  Patient feels unsafe to discharge to his home.  He has 5 steps.  He does not have a ramp.  He is largely wheelchair-bound due to chronic pain.    1/17: Vital stable overnight.  -130.  Glucose improved with increase Lantus dosing.  A.m. blood sugar 104.  Patient is now being followed by complex discharge committee.    1/18: Vitals notable for SBP .  Glucose ranging 125 through 196.  Patient required 1 unit sliding scale insulin yesterday.  Patient remains medically clear for discharge though a safe discharge plan has not been arranged yet.  Encouraged ambulation at least 3 times daily.    1/19: Vital stable overnight.   through 126.  Glucose range .  Required 2 units SSI over last 24 hours.    1/20: Vitals stable overnight.  Patient endorsing depression and refusal to participate in medical therapies.  I have placed a referral for inpatient psychology/behavioral health.  Increase venlafaxine to 75 mg.  Continue to encourage participation in ambulation and patient rehabilitation.    1/21 Patient able to self transfer to chair, reports ongoing low back pain, pain level of 7/10  States that he is now wheelchair-bound, pending placement,  to assist  Patient with hypoglycemic episode this a.m. was given juice, unable to self administer insulin at home, will need placement assistance    1/22 no new events, sleeping arousable, AFO delivered for PT reeval    1/23 reports low back pain, able to transfer to chair, PT blanca    1/24 ongoing lbp, min activity, Has not worked with PT with AFO, to reconsult  Improving blood sugar    1/25 patient with hypoglycemia this morning, blood sugar of 69, arousable improved after dextrose administration, Lantus decreased patient did work with PT with AFO, encourage activity    1/26 improved blood sugar, patient sitting up in chair, no  new complaints  Patient did have increased activity with AFO, continue to encourage activity    1/27 patient was noted to be hypotensive with increasing respiratory failure requiring 4-5 L oxygen supplementation, chest x-ray with infiltrates  Patient was started on septic protocol, given 4 L of IV fluid hydration as well as initiated on IV antibiotics, UA is negative  Patient does not recall events of last evening, patient feels mild low back pain, patient does report dry cough, still on 4 to 5 L, taper and oxygen needs  Encourage activity    1/28 TAKING OVER CARE  Afebrile, normal HR/RR, 's-140's, SpO2 95-97% on 2 L NC.  No leukocytosis, stable anemia, mild thrombocytopenia.  LFT's improving, blood cultures remain negative.  Repeat procalcitonin in the morning, check CRP, CBC.  Suspect will be de-escalating antibiotics tomorrow.  Feeling general fatigue.  Tolerating p.o. intake.    1/29  Looking a bit better today.  Afebrile normal pulse and respiratory rate normal blood pressure, weaned off of supplemental oxygen today.  Procalcitonin checked from 30 down to 16, continue antibiotics for now.  Check magnesium, 1.3, IV replacement ordered.  Having some aches and pains, analgesia adjusted.  Tolerating p.o. intake.    1/30  Afebrile with normal vitals, weaned down to room air.  Sitting up edge of bed eating his meal, no acute complaints, tolerating p.o. intake.  Will work with pharmacy to see about ordering him a continuous glucose monitor as well as insulin pump to help alleviate any insulin administration concerns holding him up from getting into a group home.  Patient was interested in this idea as well.    1/31  Will order insulin pump and CGM and see if we can get it through approved services which could help him get discharged to a group home.   No new complaints, tolerating diet.     I have discussed this patient's plan of care and discharge plan at IDT rounds today with Case Management, Nursing, Nursing  leadership, and other members of the IDT team.    Consultants/Specialty  None at this time     Code Status  Full Code    Disposition  The patient is medically cleared for discharge to home or a post-acute facility.      I have placed the appropriate orders for post-discharge needs.    Review of Systems  Review of Systems   Constitutional:  Positive for malaise/fatigue. Negative for chills, diaphoresis and fever.   Eyes:  Negative for blurred vision and double vision.   Respiratory:  Positive for cough and shortness of breath. Negative for sputum production and wheezing.    Cardiovascular:  Negative for palpitations.   Gastrointestinal:  Negative for abdominal pain and nausea.   Genitourinary:  Negative for dysuria.   Musculoskeletal:  Positive for back pain and myalgias.   Neurological:  Positive for weakness. Negative for dizziness and headaches.   Psychiatric/Behavioral:  Negative for depression. The patient is not nervous/anxious.         Physical Exam  Temp:  [36.2 °C (97.2 °F)-36.8 °C (98.3 °F)] 36.4 °C (97.5 °F)  Pulse:  [75-86] 86  Resp:  [17-18] 18  BP: (117-142)/(68-92) 132/87  SpO2:  [93 %-97 %] 95 %    Physical Exam  Vitals and nursing note reviewed.   Constitutional:       General: He is not in acute distress.     Appearance: Normal appearance. He is not ill-appearing, toxic-appearing or diaphoretic.   HENT:      Mouth/Throat:      Mouth: Mucous membranes are moist.      Pharynx: Oropharynx is clear.   Eyes:      General: No scleral icterus.     Extraocular Movements: Extraocular movements intact.      Conjunctiva/sclera: Conjunctivae normal.   Cardiovascular:      Rate and Rhythm: Regular rhythm.      Pulses: Normal pulses.   Pulmonary:      Effort: Pulmonary effort is normal. No respiratory distress.      Breath sounds: No wheezing, rhonchi or rales.   Abdominal:      General: There is no distension.      Tenderness: There is no abdominal tenderness. There is no guarding or rebound.   Musculoskeletal:          General: No swelling or tenderness.      Cervical back: Normal range of motion. No rigidity or tenderness.   Skin:     General: Skin is warm.      Coloration: Skin is not jaundiced.      Findings: No erythema.   Neurological:      Mental Status: He is alert and oriented to person, place, and time. Mental status is at baseline.      Cranial Nerves: No cranial nerve deficit.   Psychiatric:         Mood and Affect: Mood normal.         Behavior: Behavior normal.         Thought Content: Thought content normal.         Judgment: Judgment normal.         Fluids    Intake/Output Summary (Last 24 hours) at 2/1/2025 0535  Last data filed at 2/1/2025 0500  Gross per 24 hour   Intake 420 ml   Output 2100 ml   Net -1680 ml        Laboratory  Recent Labs     01/29/25  0924 01/31/25  0551   WBC 5.4 5.0   RBC 3.59* 3.85*   HEMOGLOBIN 9.3* 9.7*   HEMATOCRIT 29.4* 31.0*   MCV 81.9 80.5*   MCH 25.9* 25.2*   MCHC 31.6* 31.3*   RDW 40.8 40.2   PLATELETCT 161* 180   MPV 9.1 9.1     Recent Labs     01/29/25  0924 01/31/25  0551   SODIUM 138 141   POTASSIUM 3.9 4.4   CHLORIDE 96 99   CO2 31 34*   GLUCOSE 155* 212*   BUN 14 20   CREATININE 0.60 0.67   CALCIUM 8.5 8.6                     Imaging  US-RUQ   Final Result      1.  Distended gallbladder with gallbladder sludge and a small calcified gallstone. No discrete sonographic evidence of acute cholecystitis.   2.  Mildly dilated main portal vein which could indicate portal hypertension      DX-CHEST-PORTABLE (1 VIEW)   Final Result      Left basilar atelectasis. Underlying infection is possible.      MR-LUMBAR SPINE-W/O   Final Result      1.  T12 chronic compression fracture with anterior wedge deformity.   2.  Postoperative changes with interval L4-5 laminectomy since prior MRI study. There is now laminectomy at L4-5-S1 and there is now posterior fusion with transpedicular screw fixation at L4-L5. Relief of previously seen severe central stenosis at L4-5.   3.  Multilevel  degenerative changes as detailed for each level above in the body of report.      MR-THORACIC SPINE-W/O   Final Result      1.  T12 old compression fracture with anterior wedge deformity. Associated mild lower thoracic kyphosis.   2.  T2 old superior endplate compression fracture.   3.  Multilevel disc bulges T3-T4, T7-T8, T8-T9, T11-12, T12-L1. Variable mass effect on the ventral thecal sac without quinton central stenosis or cord impingement at any thoracic level.   4.  No myelopathic cord signal at any thoracic level.      DX-CHEST-PORTABLE (1 VIEW)   Final Result      No acute cardiopulmonary disease evident.           Assessment/Plan  * Compression fracture of body of thoracic vertebra (HCC)- (present on admission)  Assessment & Plan  MRI T-spine with T12 old compression fracture, T2 old superior endplate compression fracture, multiple level disc bulges T3-4 T7-T8 T8-T9, T11-T12, variable mass effect with out  central stenosis or cord impingement, MRI L-spine with T12 compression fracture, previous L4-L5 laminectomy, multilevel degenerative changes.  - Neurosurgery has been consulted  - Recommending non operative management at this time  - Continue to follow-up outpatient for possible epidural  - Continue gabapentin, lidocaine and oxycodone as needed for pain management  - PT OT recommending postacute rehabilitation  - I personally reviewed MRI images with patient at bedside 1/16 1/21 placement pending, f/u with CM  - ongoing LBP, schedule flexeril    1/22 PT reeval with AFO now available  Pain control, encourage activity, placement pending    1/23 PT  with AFO, encourage activity, pending placement, CM to assist, ? GH  1/24 PT to work with patient with AFO, consult replaced  Encourage activity    1/25 patient worked with PT with AFO, encourage activity    1/26 cont activity, placement pending    1/27 difficult discharge,  assisting  Considering transfer to another state to be closer to family,  patient can no longer live alone, cannot self administer insulin or take care of himself  Encourage activity  Placement pending    Sepsis ([facility])  Assessment & Plan  This is Sepsis Not present on admission  SIRS criteria identified on my evaluation include: Tachycardia, with heart rate greater than 90 BPM and Tachypnea, with respirations greater than 20 per minute  Clinical indicators of end organ dysfunction include Hypotension with systolic blood pressure less than 90 or MAP less than 65, Lactic Acid greater than 2, and Acute Respiratory Failure - (mechanical ventilation or BiPap or PaO2/FiO2 ratio < 300)  Source is pneumonia/lungs  Sepsis protocol initiated  Crystalloid Fluid Administration: Fluid resuscitation ordered per standard protocol - 30 mL/kg per current or ideal body weight  IV antibiotics as appropriate for source of sepsis  Reassessment: I have reassessed the patient's hemodynamic status    1/27 septic protocol initiated, received 4 L of fluid hydration, improved blood pressure, lactic acidosis resolved, will follow-up blood a.m. lactic acid level, repeat is at 2  Borderline hypotension, continue fluid hydration  Patient now on IV antibiotics, follow-up cultures  Taper oxygen as tolerated, chest x-ray concerning for infiltrates    Moderate episode of recurrent major depressive disorder (HCC)  Assessment & Plan  Patient endorsing depression.  Unwillingness to participate in his rehabilitation.  Endorses feelings of helplessness and hopelessness.  Per nursing staff he has expressed SI without a plan.  He does not endorse SI to me; on 2 separate occasions.  - Continue to encourage patient to participate in his rehabilitation.  - Increase venlafaxine to 75 mg  - Inpatient behavioral health referral has been placed.  - I do not see an indication to initiate a legal hold at this point.  Patient denies SI and SI plan to me.  Per nursing staff patient does become agitated when his snacks are withheld.  We will  continue to monitor the situation closely.    Bacteriuria- (present on admission)  Assessment & Plan  No abx's for now given lack of symptoms    Physical debility- (present on admission)  Assessment & Plan  MRI with compression T2 and T12 fracture, multilevel bulging disc through thoracic spine.  No cord impingement.  Neurosurgery recommended nonoperative management  Follow-up for epidural injection as outpatient  Placement is pending    1/21 Patient reports ambulatory status prior to this admission, to follow-up with orthopedic surgery for AFO evaluation  , Will reconsult PT OT for AFO as needed, will consult orthopedic surgery as needed  -Patient reports low back pain, has Zanaflex as needed, will add Flexeril 3 times daily  Continue pain control  Encourage activity    Acute hypoxic respiratory failure (HCC)- (present on admission)  Assessment & Plan  Resolved  Now on room air, continue to monitor oxygen requirement    Elevated troponin- (present on admission)  Assessment & Plan  Chronically elevated. Baseline  - no further workup    Uncontrolled type 2 diabetes mellitus with hyperglycemia (HCC)- (present on admission)  Assessment & Plan  A1C 9.8  - Continue Lantus to 25 units in the evening  - Continue sliding scale insulin; has required 2 units SSI in last 24 hours  - Patient states he is unable to drop insulin at home due to hand coordination issues.  We will try to minimize sliding scale insulin and discharged with only Lantus pens.  - Monitor BG trend.   - Accu-Cheks before meals and at bedtime.   - Goal to keep BG between 140-180 per 2019 ADA guidelines     1/21 hypoglycemia this am, decrease Lantus to 18 units, cont ssi  - f/u am labs    1/22 bs improved, on lantus 18 units, cont and monitor  ? GH    1/23 improvng bs, increase Lantus 20 u, add metformin, monitor    1/24 improving, cont lantus, f/u am bmp, consider increasing metformin    1/25 hypoglycemia this a.m., decreased metformin and Lantus, monitor  closely    1/26 improved bs, now on lantus 10u, monitor, encourage activity    1/27 improved control, continue Lantus    CKD (chronic kidney disease)- (present on admission)  Assessment & Plan  Creatinine stable at 0.57  Stable, secondary to diabetes       VTE prophylaxis:    enoxaparin ppx      I have performed a physical exam and reviewed and updated ROS and Plan today (1/31/2025). In review of yesterday's note (1/30/2025), there are no changes except as documented above.  Total time spent 45 minutes. I spent greater than 50% of the time for patient care, counseling, and coordination on this date, including unit/floor time, and face-to-face time with the patient as per interval events, my own review of patient's imaging and lab analysis and developing my assessment and plan above.

## 2025-02-01 NOTE — CARE PLAN
The patient is Stable - Low risk of patient condition declining or worsening    Shift Goals  Clinical Goals: Patient's pain will be <5 throughout shift  Patient Goals: Pain mgt and sleep  Family Goals: KATHERINE    Progress made toward(s) clinical / shift goals:    Problem: Safety  Goal: Will remain free from injury  Outcome: Progressing  Note: Patient utilized his call light to communicate his needs with his nurse and did not get out of bed with notifying RN first.     Problem: Pain Management  Goal: Pain level will decrease to patient's comfort goal  Outcome: Progressing  Note: Patient was able to reach his comfort goal and reported adequate pain level       Patient is not progressing towards the following goals:

## 2025-02-01 NOTE — CARE PLAN
Problem: Safety  Goal: Will remain free from injury  Outcome: Progressing  Note: Patient utilized his call light to communicate his needs with his nurse and did not get out of bed with notifying RN first.     Problem: Pain Management  Goal: Pain level will decrease to patient's comfort goal  Outcome: Progressing  Note: Patient was able to reach his comfort goal and reported adequate pain level   The patient is Stable - Low risk of patient condition declining or worsening    Shift Goals  Clinical Goals: Patient's pain will be <5 throughout shift  Patient Goals: Pain mgt and sleep  Family Goals: KATHERINE    Progress made toward(s) clinical / shift goals:      Patient is not progressing towards the following goals:

## 2025-02-01 NOTE — DIETARY
Nutrition Services: Follow-up for Po Intake   Day 21 of admit. Christoph Taylor is a 59 y.o. male with admitting DX of Physical debility [R53.81]  Acute respiratory failure with hypoxia (HCC) [J96.01]    PO intake averages >75% x 8 meals.   Last Bm 1/30/25.   Pertinent Medications: amoxicillin, insulin, omeprazole       Current diet order:   Consistent CHO diet       Food and Nutrition-Related Knowledge Deficit related to lack or prior education as evidenced by need for further education.     Nutrition Dx Status: Ongoing      Problem: Nutritional:  Goal: Achieve adequate nutritional intake  Description: Patient will consume >75% of meals  Outcome: Met      Plan/ Recommendations:      Encourage intake of meals, goal for >75% consumption from meals and/or supplements  Document intake of all meals as % taken in ADL's to provide interdisciplinary communication across all shifts.   Monitor weight.  Nutrition rep available to see patient for ongoing meal and snack preferences.       RD following

## 2025-02-01 NOTE — CARE PLAN
The patient is Stable - Low risk of patient condition declining or worsening    Shift Goals  Clinical Goals: Patient's pain will be <5 throughout shift  Patient Goals: Sleep and pain mgt  Family Goals: KATHERINE    Progress made toward(s) clinical / shift goals:    Patient is alert and oriented x4, able to communicate needs and calls appropriately. Patient's pain is controlled with medication, patch and rest. Patient's bed alarm is on, bed in low position, hourly rounding done and call light within reach.      Problem: Skin Integrity  Goal: Risk for impaired skin integrity will decrease  Outcome: Progressing     Problem: Pain Management  Goal: Pain level will decrease to patient's comfort goal  Outcome: Progressing     Problem: Pain Management  Goal: Pain level will decrease to patient's comfort goal  Outcome: Progressing     Problem: Safety  Goal: Will remain free from falls  Outcome: Progressing     Problem: Safety  Goal: Will remain free from injury  Outcome: Progressing

## 2025-02-02 LAB
GLUCOSE BLD STRIP.AUTO-MCNC: 191 MG/DL (ref 65–99)
GLUCOSE BLD STRIP.AUTO-MCNC: 196 MG/DL (ref 65–99)
GLUCOSE BLD STRIP.AUTO-MCNC: 218 MG/DL (ref 65–99)
GLUCOSE BLD STRIP.AUTO-MCNC: 263 MG/DL (ref 65–99)

## 2025-02-02 PROCEDURE — 700102 HCHG RX REV CODE 250 W/ 637 OVERRIDE(OP): Performed by: INTERNAL MEDICINE

## 2025-02-02 PROCEDURE — 99232 SBSQ HOSP IP/OBS MODERATE 35: CPT | Performed by: STUDENT IN AN ORGANIZED HEALTH CARE EDUCATION/TRAINING PROGRAM

## 2025-02-02 PROCEDURE — A9270 NON-COVERED ITEM OR SERVICE: HCPCS | Performed by: INTERNAL MEDICINE

## 2025-02-02 PROCEDURE — A9270 NON-COVERED ITEM OR SERVICE: HCPCS | Performed by: NURSE PRACTITIONER

## 2025-02-02 PROCEDURE — 700111 HCHG RX REV CODE 636 W/ 250 OVERRIDE (IP): Mod: JZ | Performed by: INTERNAL MEDICINE

## 2025-02-02 PROCEDURE — 700102 HCHG RX REV CODE 250 W/ 637 OVERRIDE(OP): Performed by: NURSE PRACTITIONER

## 2025-02-02 PROCEDURE — 82962 GLUCOSE BLOOD TEST: CPT | Mod: 91

## 2025-02-02 PROCEDURE — 700101 HCHG RX REV CODE 250: Performed by: NURSE PRACTITIONER

## 2025-02-02 PROCEDURE — A9270 NON-COVERED ITEM OR SERVICE: HCPCS

## 2025-02-02 PROCEDURE — 770006 HCHG ROOM/CARE - MED/SURG/GYN SEMI*

## 2025-02-02 PROCEDURE — A9270 NON-COVERED ITEM OR SERVICE: HCPCS | Performed by: STUDENT IN AN ORGANIZED HEALTH CARE EDUCATION/TRAINING PROGRAM

## 2025-02-02 PROCEDURE — 90832 PSYTX W PT 30 MINUTES: CPT | Performed by: SOCIAL WORKER

## 2025-02-02 PROCEDURE — 700102 HCHG RX REV CODE 250 W/ 637 OVERRIDE(OP)

## 2025-02-02 PROCEDURE — 700102 HCHG RX REV CODE 250 W/ 637 OVERRIDE(OP): Performed by: STUDENT IN AN ORGANIZED HEALTH CARE EDUCATION/TRAINING PROGRAM

## 2025-02-02 RX ADMIN — VENLAFAXINE HYDROCHLORIDE 75 MG: 75 TABLET ORAL at 04:45

## 2025-02-02 RX ADMIN — GABAPENTIN 200 MG: 100 CAPSULE ORAL at 18:12

## 2025-02-02 RX ADMIN — INSULIN LISPRO 3 UNITS: 100 INJECTION, SOLUTION INTRAVENOUS; SUBCUTANEOUS at 18:28

## 2025-02-02 RX ADMIN — OXYCODONE 5 MG: 5 TABLET ORAL at 12:27

## 2025-02-02 RX ADMIN — INSULIN LISPRO 1 UNITS: 100 INJECTION, SOLUTION INTRAVENOUS; SUBCUTANEOUS at 22:24

## 2025-02-02 RX ADMIN — OMEPRAZOLE 40 MG: 20 CAPSULE, DELAYED RELEASE ORAL at 04:44

## 2025-02-02 RX ADMIN — INSULIN LISPRO 1 UNITS: 100 INJECTION, SOLUTION INTRAVENOUS; SUBCUTANEOUS at 12:33

## 2025-02-02 RX ADMIN — OXYCODONE 5 MG: 5 TABLET ORAL at 18:16

## 2025-02-02 RX ADMIN — ENOXAPARIN SODIUM 40 MG: 100 INJECTION SUBCUTANEOUS at 18:11

## 2025-02-02 RX ADMIN — GABAPENTIN 200 MG: 100 CAPSULE ORAL at 12:27

## 2025-02-02 RX ADMIN — LIDOCAINE 3 PATCH: 4 PATCH TOPICAL at 18:12

## 2025-02-02 RX ADMIN — INSULIN LISPRO 2 UNITS: 100 INJECTION, SOLUTION INTRAVENOUS; SUBCUTANEOUS at 08:21

## 2025-02-02 RX ADMIN — GABAPENTIN 200 MG: 100 CAPSULE ORAL at 04:45

## 2025-02-02 RX ADMIN — OMEPRAZOLE 40 MG: 20 CAPSULE, DELAYED RELEASE ORAL at 18:11

## 2025-02-02 ASSESSMENT — PAIN DESCRIPTION - PAIN TYPE
TYPE: ACUTE PAIN

## 2025-02-02 NOTE — CARE PLAN
The patient is Stable - Low risk of patient condition declining or worsening    Shift Goals  Clinical Goals: Patient pain will be <4 throughout shift, monitor blood glucose  Patient Goals: Rest  Family Goals: KATHERINE    Patient a/o x4. Patient states pain is 7/10. Pain medication given. Call light and belongings are within reach. Bed is in low locked position. Patient had snacks and blood sugar was monitored.     Progress made toward(s) clinical / shift goals:    Problem: Mobility  Goal: Patient's capacity to carry out activities will improve  Outcome: Not Progressing     Problem: Safety  Goal: Will remain free from injury  Outcome: Progressing  Goal: Will remain free from falls  Outcome: Progressing     Problem: Pain Management  Goal: Pain level will decrease to patient's comfort goal  Outcome: Progressing     Problem: Infection  Goal: Will remain free from infection  Outcome: Progressing     Problem: Discharge Barriers/Planning  Goal: Patient's continuum of care needs are met  Outcome: Progressing     Problem: Bowel Elimination  Goal: Establish and maintain regular bowel function  Outcome: Progressing     Problem: Self Care  Goal: Patient will have the ability to perform ADLs independently or with assistance (bathe, groom, dress, toilet and feed)  Outcome: Progressing     Problem: Skin Integrity  Goal: Risk for impaired skin integrity will decrease  Outcome: Progressing       Patient is not progressing towards the following goals:      Problem: Mobility  Goal: Patient's capacity to carry out activities will improve  Outcome: Not Progressing

## 2025-02-02 NOTE — CONSULTS
"[facility]    INPATIENT ASSESSMENT    Name: Christoph Taylor  MRN: [id number]  : 1965  Age: 59 y.o.  Date of assessment: 2025  PCP: YESY Tariq.  Persons in attendance: Patient      HPI: Per Medical Record:  \"Christoph Taylor is a 59 y.o. male who presents to the ED via EMS for evaluation of weakness onset a week ago. The patient states he also has weakness in his legs, cough, and diarrhea for the past month, but denies any fever, runny nose, chest pain, difficulty breathing, nausea or vomiting. He describes that his pain is located in the middle of his back. Denies any radiation. He also is noting that he has been having difficulty gripping things in bilateral hands, which has made giving himself insulin difficult. He notes that his last dose of insulin was about 4 days ago. He also reports that he has had some blood in his urine, but denies any difficulty urinating. The patient states that his pain has been typical, but has been worsening, which is why he reports to the ED today. Denies any recent falls, injuries, or trauma. He notes that he is on gabapentin for his pain. Denies normally being on oxygen. The patient reports that he lives alone and does not normally get around, however when he does he uses a wheelchair. The patient has an allergy to ketamine.\" Patient was referred to Behavioral Health for a psychotherapy session.      Psychotherapy Session Summary (as stated by Patient): Christoph Taylor is a 59 year old male seen lying on hospital bed, sleeping but easy to arouse. Patient was alert, oriented, with clear and coherent speech. There was no sign of a thought disorder. Patient denies suicidal or homicidal ideations. Patient continues to endorse depression stating, \"there is no change really\".  Patient continues to display a flat affect with no range of emotion.   Patient reports he is waiting for news about his transfer stating, \"I really don't know what is going " "on\".  Patient states he is unsure of his discharge plan or the activities related to his care post hospitalization. Encouraged patient to discuss details with his  for clarification of discharge plan.   Clinician continued to provide encouragement and support to patient through this process. Patient's energy level is low, however he agreed to sit in hospital chair for meals and continue to cooperate with PT/OT services as provided. At this time patient appears to be at baseline related to his mental health functioning and may not be benefiting from ongoing psychotherapy. Signing off for now. If patient requests psychotherapy services , we are happy to return, please re-consult if needed.    Chief Complaint   Patient presents with    Weakness     Increasing weakness last week, decreased dexterity with upper extremities baseline, thoracic back pain and worsening last x4 days, prior back surgeries, Rx Gabapentin    High Blood Sugar     With , has not been able to give self insulin last x4 days, at ER POC glucose 382      MENTAL STATUS              Participation: adequate  Grooming: Casual  Orientation: Alert  Behavior: Tense  Eye contact: Limited  Mood: Depressed  Affect: Flat  Thought process: Circumstantial  Thought content: Within normal limits  Speech: Soft  Perception: Within normal limits  Memory:  Recent:  Adequate  Insight: Adequate  Judgment:  Adequate  Other:    CLINICAL IMPRESSIONS:  Primary:  Major Depressive Disorder, recurrent  Secondary:  Depression due to other medical condition    Recommendations:  Case management- Patient reports limited knowledge of discharge plan, patient would benefit from an update of the status of his plan to increase his engagement and participation in his overall life and future well being.        Signing off    Thank you,   Farzana Tripathi, Ph.D., [facility]  2/2/2025   Length of intervention: 30 minutes   "

## 2025-02-02 NOTE — CARE PLAN
The patient is Stable - Low risk of patient condition declining or worsening    Shift Goals  Clinical Goals: Patient's pain will be <5 throughout the shift  Patient Goals: Sleep and rest  Family Goals: KATHERINE    Progress made toward(s) clinical / shift goals:    Patient is alert and oriented x4, able to communicate needs and calls appropriately. Patient's pain is controlled with medication, patch and rest. Patient's bed alarm is on, bed in low position, hourly rounding done and call light within reach.        Problem: Skin Integrity  Goal: Risk for impaired skin integrity will decrease  Outcome: Progressing     Problem: Pain Management  Goal: Pain level will decrease to patient's comfort goal  Outcome: Progressing     Problem: Pain Management  Goal: Pain level will decrease to patient's comfort goal  Outcome: Progressing     Problem: Safety  Goal: Will remain free from falls  Outcome: Progressing     Problem: Safety  Goal: Will remain free from injury  Outcome: Progressing

## 2025-02-03 LAB
GLUCOSE BLD STRIP.AUTO-MCNC: 212 MG/DL (ref 65–99)
GLUCOSE BLD STRIP.AUTO-MCNC: 225 MG/DL (ref 65–99)
GLUCOSE BLD STRIP.AUTO-MCNC: 238 MG/DL (ref 65–99)

## 2025-02-03 PROCEDURE — A9270 NON-COVERED ITEM OR SERVICE: HCPCS | Performed by: INTERNAL MEDICINE

## 2025-02-03 PROCEDURE — 700102 HCHG RX REV CODE 250 W/ 637 OVERRIDE(OP): Performed by: NURSE PRACTITIONER

## 2025-02-03 PROCEDURE — A9270 NON-COVERED ITEM OR SERVICE: HCPCS | Performed by: STUDENT IN AN ORGANIZED HEALTH CARE EDUCATION/TRAINING PROGRAM

## 2025-02-03 PROCEDURE — 82962 GLUCOSE BLOOD TEST: CPT | Mod: 91

## 2025-02-03 PROCEDURE — 700102 HCHG RX REV CODE 250 W/ 637 OVERRIDE(OP)

## 2025-02-03 PROCEDURE — 99232 SBSQ HOSP IP/OBS MODERATE 35: CPT | Performed by: STUDENT IN AN ORGANIZED HEALTH CARE EDUCATION/TRAINING PROGRAM

## 2025-02-03 PROCEDURE — A9270 NON-COVERED ITEM OR SERVICE: HCPCS | Performed by: NURSE PRACTITIONER

## 2025-02-03 PROCEDURE — 700102 HCHG RX REV CODE 250 W/ 637 OVERRIDE(OP): Performed by: INTERNAL MEDICINE

## 2025-02-03 PROCEDURE — A9270 NON-COVERED ITEM OR SERVICE: HCPCS

## 2025-02-03 PROCEDURE — 700111 HCHG RX REV CODE 636 W/ 250 OVERRIDE (IP): Mod: JZ | Performed by: INTERNAL MEDICINE

## 2025-02-03 PROCEDURE — 700102 HCHG RX REV CODE 250 W/ 637 OVERRIDE(OP): Performed by: STUDENT IN AN ORGANIZED HEALTH CARE EDUCATION/TRAINING PROGRAM

## 2025-02-03 PROCEDURE — 770006 HCHG ROOM/CARE - MED/SURG/GYN SEMI*

## 2025-02-03 PROCEDURE — 700101 HCHG RX REV CODE 250: Performed by: NURSE PRACTITIONER

## 2025-02-03 RX ADMIN — VENLAFAXINE HYDROCHLORIDE 75 MG: 75 TABLET ORAL at 05:20

## 2025-02-03 RX ADMIN — OXYCODONE 5 MG: 5 TABLET ORAL at 05:29

## 2025-02-03 RX ADMIN — GABAPENTIN 200 MG: 100 CAPSULE ORAL at 12:11

## 2025-02-03 RX ADMIN — ENOXAPARIN SODIUM 40 MG: 100 INJECTION SUBCUTANEOUS at 17:55

## 2025-02-03 RX ADMIN — OXYCODONE 5 MG: 5 TABLET ORAL at 12:11

## 2025-02-03 RX ADMIN — GABAPENTIN 200 MG: 100 CAPSULE ORAL at 17:54

## 2025-02-03 RX ADMIN — INSULIN LISPRO 2 UNITS: 100 INJECTION, SOLUTION INTRAVENOUS; SUBCUTANEOUS at 08:04

## 2025-02-03 RX ADMIN — INSULIN LISPRO 2 UNITS: 100 INJECTION, SOLUTION INTRAVENOUS; SUBCUTANEOUS at 21:04

## 2025-02-03 RX ADMIN — GABAPENTIN 200 MG: 100 CAPSULE ORAL at 05:20

## 2025-02-03 RX ADMIN — LIDOCAINE 3 PATCH: 4 PATCH TOPICAL at 16:09

## 2025-02-03 RX ADMIN — OMEPRAZOLE 40 MG: 20 CAPSULE, DELAYED RELEASE ORAL at 17:54

## 2025-02-03 RX ADMIN — OXYCODONE 5 MG: 5 TABLET ORAL at 17:54

## 2025-02-03 RX ADMIN — SITAGLIPTIN 100 MG: 100 TABLET, FILM COATED ORAL at 17:55

## 2025-02-03 RX ADMIN — OMEPRAZOLE 40 MG: 20 CAPSULE, DELAYED RELEASE ORAL at 05:20

## 2025-02-03 RX ADMIN — TIZANIDINE 2 MG: 4 TABLET ORAL at 16:09

## 2025-02-03 RX ADMIN — INSULIN LISPRO 2 UNITS: 100 INJECTION, SOLUTION INTRAVENOUS; SUBCUTANEOUS at 17:56

## 2025-02-03 RX ADMIN — INSULIN LISPRO 2 UNITS: 100 INJECTION, SOLUTION INTRAVENOUS; SUBCUTANEOUS at 13:03

## 2025-02-03 ASSESSMENT — ENCOUNTER SYMPTOMS
NAUSEA: 0
NERVOUS/ANXIOUS: 0
DOUBLE VISION: 0
SPUTUM PRODUCTION: 0
ABDOMINAL PAIN: 0
COUGH: 1
DEPRESSION: 0
MYALGIAS: 1
WHEEZING: 0
PALPITATIONS: 0
HEADACHES: 0
WEAKNESS: 1
DIZZINESS: 0
DIAPHORESIS: 0
BLURRED VISION: 0
FEVER: 0
BACK PAIN: 1
CHILLS: 0
SHORTNESS OF BREATH: 1

## 2025-02-03 ASSESSMENT — PAIN DESCRIPTION - PAIN TYPE
TYPE: ACUTE PAIN
TYPE: CHRONIC PAIN
TYPE: ACUTE PAIN
TYPE: ACUTE PAIN;CHRONIC PAIN
TYPE: ACUTE PAIN;CHRONIC PAIN

## 2025-02-03 NOTE — CARE PLAN
The patient is Stable - Low risk of patient condition declining or worsening    Shift Goals  Clinical Goals: Pts pain will be managed throughout shift  Patient Goals: rest; pain control  Family Goals: KATHERINE    Progress made toward(s) clinical / shift goals:  Pts pain has been controlled throughout shift due to pharmacologic (see MAR) and nonpharmacologic (rest, repositioning, distraction) interventions.     Patient is not progressing towards the following goals: NA

## 2025-02-03 NOTE — CARE PLAN
The patient is Stable - Low risk of patient condition declining or worsening    Shift Goals  Clinical Goals: Pain control to comfort level less than 5/10 within 12 hour shift  Patient Goals: Rest, Pain Control  Family Goals: KATHERINE    Progress made toward(s) clinical / shift goals: Medicated per MAR for complaints of pain, with some relief verbalized. Non-pharmacologic comfort measures instructed. Assisted with toileting with [email or web address]. Wound dressing done. Able to make needs known, call light placed within easy reach.      Patient is not progressing towards the following goals:      Problem: Pain Management  Goal: Pain level will decrease to patient's comfort goal  Outcome: Not Progressing     Problem: Discharge Barriers/Planning  Goal: Patient's continuum of care needs are met  Description: Target End Date:  Prior to discharge or change in level of care    1.  Identify potential discharge barriers on admission and throughout hospitalization  2.  Collaborate with Case Management, , Clinical Educators, Navigators and others on the transitional care team to meet discharge needs  3.  Involve patient/family/caregivers in setting and prioritizing goals for hospitalization and discharge  4.  Ensure Flu vaccinations are addressed  5.  Inquire if patient is interested in the Meds to Bed program  6.  Ensure patient and family/caregiver are able to demonstrate use of equipment as prescribed  7.  Ensure patient and family/caregiver can verbalize understanding of patient education  8.  Explain discharge instructions and medication reconciliation to patient and family/caregiver  Outcome: Not Progressing     Problem: Mobility  Goal: Patient's capacity to carry out activities will improve  Description: Target End Date:  Prior to discharge or change in level of care    1.  Assess for barriers to mobility/activity  2.  Implement activity per interdisciplinary team recommendations  3.  Target activity level  identified and patient/family/caregiver aware of goal  4.  Provide assistive devices  5.  Instruct patient/caregiver on proper use of assistive/adaptive devices  6.  Schedule activities and rest periods to decrease effects of fatigue  7.  Encourage mobilization to extent of ability  8.  Maintain proper body alignment  9.  Provide adequate pain management to allow progressive mobilization  10. Implement pace maker precautions as needed  Outcome: Not Progressing     Problem: Self Care  Goal: Patient will have the ability to perform ADLs independently or with assistance (bathe, groom, dress, toilet and feed)  Description: Target End Date:  Prior to discharge or change in level of care    Document on ADL flowsheet    1.  Assess the capability and level of deficiency to perform ADLs  2.  Encourage family/care giver involvement  3.  Provide assistive devices  4.  Consider PT/OT evaluations  5.  Maintain support, give positive feedback, encourage self-care allowing extra time and verbal cuing as needed  6.  Avoid doing something for patients they can do themselves, but provide assistance as needed  7.  Assist in anticipating/planning individual needs  8.  Collaborate with Case Management and  to meet discharge needs  Outcome: Not Progressing     Problem: Skin Integrity  Goal: Risk for impaired skin integrity will decrease  Outcome: Not Progressing

## 2025-02-03 NOTE — PROGRESS NOTES
Hospital Medicine Daily Progress Note    Date of Service  2/2/2025    Chief Complaint  Christoph Taylor is a 59 y.o. male admitted 1/9/2025 with weakness.    Hospital Course  Christoph Taylor is a 59-year-old male with prior suicidal ideation, poorly controlled diabetes mellitus type 2, housing insecurity and severe protein calorie malnutrition.  Admitted 1/9 for acute on chronic back pain.    Patient states that he is unable to take care of himself and has a hard time administering insulin, buying food for himself, taking his blood sugars. Additionally says that he has worsening neuropathy of the feet and hands. His pain is worse than ever. He has no additional help here.    In the ER he was found to be hyperglycemic and quite weak.  UA was concerning for possible UTI.  Patient was given 1 dose of IV cefazolin.  However, he denied urinary symptoms and antibiotics were not continued.     In terms of patient's weakness-MRI thoracic and lumbar notable for old compression fracture at T12 and multiple disc bulging without quinton central canal stenosis or cord impingement.  Neurosurgery was consulted.  Dr. Johnson recommending non operative management and outpatient follow-up for possible epidural injection.  PT OT recommending postacute rehabilitation.  However, no accepting facilities were identified.  Patient intermittently refusing to work with PT here.    Home health has been arranged prior to discharge.    Interval Problem Update  1/14: Vitals stable overnight.  -129.  Patient is medically clear for discharge pending placement.    1/15: Vitals remained stable.   through 156.  Glucose ranging 104 through 246.  Case management is working hard to obtain patient's insurance card to evaluate benefits for postacute rehabilitation.  Patient is medically cleared to discharge once insurance barrier has been resolved.    1/16: Vitals stable.  Glucose this a.m. 176.  Range 2 29 through 3 05.  Increase Lantus to  25 units in the p.m.  Multiple long discussions with patient, case management and nursing staff regarding discharge plan.  Patient feels unsafe to discharge to his home.  He has 5 steps.  He does not have a ramp.  He is largely wheelchair-bound due to chronic pain.    1/17: Vital stable overnight.  -130.  Glucose improved with increase Lantus dosing.  A.m. blood sugar 104.  Patient is now being followed by complex discharge committee.    1/18: Vitals notable for SBP .  Glucose ranging 125 through 196.  Patient required 1 unit sliding scale insulin yesterday.  Patient remains medically clear for discharge though a safe discharge plan has not been arranged yet.  Encouraged ambulation at least 3 times daily.    1/19: Vital stable overnight.   through 126.  Glucose range .  Required 2 units SSI over last 24 hours.    1/20: Vitals stable overnight.  Patient endorsing depression and refusal to participate in medical therapies.  I have placed a referral for inpatient psychology/behavioral health.  Increase venlafaxine to 75 mg.  Continue to encourage participation in ambulation and patient rehabilitation.    1/21 Patient able to self transfer to chair, reports ongoing low back pain, pain level of 7/10  States that he is now wheelchair-bound, pending placement,  to assist  Patient with hypoglycemic episode this a.m. was given juice, unable to self administer insulin at home, will need placement assistance    1/22 no new events, sleeping arousable, AFO delivered for PT reeval    1/23 reports low back pain, able to transfer to chair, PT blanca    1/24 ongoing lbp, min activity, Has not worked with PT with AFO, to reconsult  Improving blood sugar    1/25 patient with hypoglycemia this morning, blood sugar of 69, arousable improved after dextrose administration, Lantus decreased patient did work with PT with AFO, encourage activity    1/26 improved blood sugar, patient sitting up in chair, no  new complaints  Patient did have increased activity with AFO, continue to encourage activity    1/27 patient was noted to be hypotensive with increasing respiratory failure requiring 4-5 L oxygen supplementation, chest x-ray with infiltrates  Patient was started on septic protocol, given 4 L of IV fluid hydration as well as initiated on IV antibiotics, UA is negative  Patient does not recall events of last evening, patient feels mild low back pain, patient does report dry cough, still on 4 to 5 L, taper and oxygen needs  Encourage activity    1/28 TAKING OVER CARE  Afebrile, normal HR/RR, 's-140's, SpO2 95-97% on 2 L NC.  No leukocytosis, stable anemia, mild thrombocytopenia.  LFT's improving, blood cultures remain negative.  Repeat procalcitonin in the morning, check CRP, CBC.  Suspect will be de-escalating antibiotics tomorrow.  Feeling general fatigue.  Tolerating p.o. intake.    1/29  Looking a bit better today.  Afebrile normal pulse and respiratory rate normal blood pressure, weaned off of supplemental oxygen today.  Procalcitonin checked from 30 down to 16, continue antibiotics for now.  Check magnesium, 1.3, IV replacement ordered.  Having some aches and pains, analgesia adjusted.  Tolerating p.o. intake.    1/30  Afebrile with normal vitals, weaned down to room air.  Sitting up edge of bed eating his meal, no acute complaints, tolerating p.o. intake.  Will work with pharmacy to see about ordering him a continuous glucose monitor as well as insulin pump to help alleviate any insulin administration concerns holding him up from getting into a group home.  Patient was interested in this idea as well.    1/31  Will order insulin pump and CGM and see if we can get it through approved services which could help him get discharged to a group home.   No new complaints, tolerating diet.     2/2  Tolerating diet, no acute complaints, normal vitals.  Will need to see if we can get approved services for CGM and  pump (working with pharmacist to get it ordered) to help facilitate a discharge to group home.    I have discussed this patient's plan of care and discharge plan at IDT rounds today with Case Management, Nursing, Nursing leadership, and other members of the IDT team.    Consultants/Specialty  None at this time     Code Status  Full Code    Disposition  Medically Cleared  I have placed the appropriate orders for post-discharge needs.    Review of Systems  Review of Systems   Constitutional:  Positive for malaise/fatigue. Negative for chills, diaphoresis and fever.   Eyes:  Negative for blurred vision and double vision.   Respiratory:  Positive for cough and shortness of breath. Negative for sputum production and wheezing.    Cardiovascular:  Negative for palpitations.   Gastrointestinal:  Negative for abdominal pain and nausea.   Genitourinary:  Negative for dysuria.   Musculoskeletal:  Positive for back pain and myalgias.   Neurological:  Positive for weakness. Negative for dizziness and headaches.   Psychiatric/Behavioral:  Negative for depression. The patient is not nervous/anxious.         Physical Exam  Temp:  [36.3 °C (97.4 °F)-37.1 °C (98.8 °F)] 36.3 °C (97.4 °F)  Pulse:  [67-82] 82  Resp:  [16-17] 16  BP: (112-146)/(62-83) 112/62  SpO2:  [91 %-99 %] 97 %    Physical Exam  Vitals and nursing note reviewed.   Constitutional:       General: He is not in acute distress.     Appearance: Normal appearance. He is not ill-appearing, toxic-appearing or diaphoretic.   HENT:      Mouth/Throat:      Mouth: Mucous membranes are moist.      Pharynx: Oropharynx is clear.   Eyes:      General: No scleral icterus.     Extraocular Movements: Extraocular movements intact.      Conjunctiva/sclera: Conjunctivae normal.   Cardiovascular:      Rate and Rhythm: Regular rhythm.      Pulses: Normal pulses.   Pulmonary:      Effort: Pulmonary effort is normal. No respiratory distress.      Breath sounds: No wheezing, rhonchi or rales.    Abdominal:      General: There is no distension.      Tenderness: There is no abdominal tenderness. There is no guarding or rebound.   Musculoskeletal:         General: No swelling or tenderness.      Cervical back: Normal range of motion. No rigidity or tenderness.   Skin:     General: Skin is warm.      Coloration: Skin is not jaundiced.      Findings: No erythema.   Neurological:      Mental Status: He is alert and oriented to person, place, and time. Mental status is at baseline.      Cranial Nerves: No cranial nerve deficit.   Psychiatric:         Mood and Affect: Mood normal.         Behavior: Behavior normal.         Thought Content: Thought content normal.         Judgment: Judgment normal.         Fluids    Intake/Output Summary (Last 24 hours) at 2/3/2025 0353  Last data filed at 2/2/2025 2300  Gross per 24 hour   Intake 1460 ml   Output 200 ml   Net 1260 ml        Laboratory  Recent Labs     01/31/25  0551   WBC 5.0   RBC 3.85*   HEMOGLOBIN 9.7*   HEMATOCRIT 31.0*   MCV 80.5*   MCH 25.2*   MCHC 31.3*   RDW 40.2   PLATELETCT 180   MPV 9.1     Recent Labs     01/31/25  0551   SODIUM 141   POTASSIUM 4.4   CHLORIDE 99   CO2 34*   GLUCOSE 212*   BUN 20   CREATININE 0.67   CALCIUM 8.6                     Imaging  US-RUQ   Final Result      1.  Distended gallbladder with gallbladder sludge and a small calcified gallstone. No discrete sonographic evidence of acute cholecystitis.   2.  Mildly dilated main portal vein which could indicate portal hypertension      DX-CHEST-PORTABLE (1 VIEW)   Final Result      Left basilar atelectasis. Underlying infection is possible.      MR-LUMBAR SPINE-W/O   Final Result      1.  T12 chronic compression fracture with anterior wedge deformity.   2.  Postoperative changes with interval L4-5 laminectomy since prior MRI study. There is now laminectomy at L4-5-S1 and there is now posterior fusion with transpedicular screw fixation at L4-L5. Relief of previously seen severe central  stenosis at L4-5.   3.  Multilevel degenerative changes as detailed for each level above in the body of report.      MR-THORACIC SPINE-W/O   Final Result      1.  T12 old compression fracture with anterior wedge deformity. Associated mild lower thoracic kyphosis.   2.  T2 old superior endplate compression fracture.   3.  Multilevel disc bulges T3-T4, T7-T8, T8-T9, T11-12, T12-L1. Variable mass effect on the ventral thecal sac without quinton central stenosis or cord impingement at any thoracic level.   4.  No myelopathic cord signal at any thoracic level.      DX-CHEST-PORTABLE (1 VIEW)   Final Result      No acute cardiopulmonary disease evident.           Assessment/Plan  * Compression fracture of body of thoracic vertebra (HCC)- (present on admission)  Assessment & Plan  MRI T-spine with T12 old compression fracture, T2 old superior endplate compression fracture, multiple level disc bulges T3-4 T7-T8 T8-T9, T11-T12, variable mass effect with out  central stenosis or cord impingement, MRI L-spine with T12 compression fracture, previous L4-L5 laminectomy, multilevel degenerative changes.  - Neurosurgery has been consulted  - Recommending non operative management at this time  - Continue to follow-up outpatient for possible epidural  - Continue gabapentin, lidocaine and oxycodone as needed for pain management  - PT OT recommending postacute rehabilitation  - I personally reviewed MRI images with patient at bedside 1/16 1/21 placement pending, f/u with CM  - ongoing LBP, schedule flexeril    1/22 PT reeval with AFO now available  Pain control, encourage activity, placement pending    1/23 PT  with AFO, encourage activity, pending placement, CM to assist, ? GH  1/24 PT to work with patient with AFO, consult replaced  Encourage activity    1/25 patient worked with PT with AFO, encourage activity    1/26 cont activity, placement pending    1/27 difficult discharge,  assisting  Considering transfer to  another state to be closer to family, patient can no longer live alone, cannot self administer insulin or take care of himself  Encourage activity  Placement pending    Sepsis ([facility])  Assessment & Plan  This is Sepsis Not present on admission  SIRS criteria identified on my evaluation include: Tachycardia, with heart rate greater than 90 BPM and Tachypnea, with respirations greater than 20 per minute  Clinical indicators of end organ dysfunction include Hypotension with systolic blood pressure less than 90 or MAP less than 65, Lactic Acid greater than 2, and Acute Respiratory Failure - (mechanical ventilation or BiPap or PaO2/FiO2 ratio < 300)  Source is pneumonia/lungs  Sepsis protocol initiated  Crystalloid Fluid Administration: Fluid resuscitation ordered per standard protocol - 30 mL/kg per current or ideal body weight  IV antibiotics as appropriate for source of sepsis  Reassessment: I have reassessed the patient's hemodynamic status    1/27 septic protocol initiated, received 4 L of fluid hydration, improved blood pressure, lactic acidosis resolved, will follow-up blood a.m. lactic acid level, repeat is at 2  Borderline hypotension, continue fluid hydration  Patient now on IV antibiotics, follow-up cultures  Taper oxygen as tolerated, chest x-ray concerning for infiltrates    Moderate episode of recurrent major depressive disorder (HCC)  Assessment & Plan  Patient endorsing depression.  Unwillingness to participate in his rehabilitation.  Endorses feelings of helplessness and hopelessness.  Per nursing staff he has expressed SI without a plan.  He does not endorse SI to me; on 2 separate occasions.  - Continue to encourage patient to participate in his rehabilitation.  - Increase venlafaxine to 75 mg  - Inpatient behavioral health referral has been placed.  - I do not see an indication to initiate a legal hold at this point.  Patient denies SI and SI plan to me.  Per nursing staff patient does become agitated  when his snacks are withheld.  We will continue to monitor the situation closely.    Bacteriuria- (present on admission)  Assessment & Plan  No abx's for now given lack of symptoms    Physical debility- (present on admission)  Assessment & Plan  MRI with compression T2 and T12 fracture, multilevel bulging disc through thoracic spine.  No cord impingement.  Neurosurgery recommended nonoperative management  Follow-up for epidural injection as outpatient  Placement is pending    1/21 Patient reports ambulatory status prior to this admission, to follow-up with orthopedic surgery for AFO evaluation  , Will reconsult PT OT for AFO as needed, will consult orthopedic surgery as needed  -Patient reports low back pain, has Zanaflex as needed, will add Flexeril 3 times daily  Continue pain control  Encourage activity    Acute hypoxic respiratory failure (HCC)- (present on admission)  Assessment & Plan  Resolved  Now on room air, continue to monitor oxygen requirement    Elevated troponin- (present on admission)  Assessment & Plan  Chronically elevated. Baseline  - no further workup    Uncontrolled type 2 diabetes mellitus with hyperglycemia (HCC)- (present on admission)  Assessment & Plan  A1C 9.8  - Continue Lantus to 25 units in the evening  - Continue sliding scale insulin; has required 2 units SSI in last 24 hours  - Patient states he is unable to drop insulin at home due to hand coordination issues.  We will try to minimize sliding scale insulin and discharged with only Lantus pens.  - Monitor BG trend.   - Accu-Cheks before meals and at bedtime.   - Goal to keep BG between 140-180 per 2019 ADA guidelines     1/21 hypoglycemia this am, decrease Lantus to 18 units, cont ssi  - f/u am labs    1/22 bs improved, on lantus 18 units, cont and monitor  ? GH    1/23 improvng bs, increase Lantus 20 u, add metformin, monitor    1/24 improving, cont lantus, f/u am bmp, consider increasing metformin    1/25 hypoglycemia this a.m.,  decreased metformin and Lantus, monitor closely    1/26 improved bs, now on lantus 10u, monitor, encourage activity    1/27 improved control, continue Lantus    CKD (chronic kidney disease)- (present on admission)  Assessment & Plan  Creatinine stable at 0.57  Stable, secondary to diabetes       VTE prophylaxis:    enoxaparin ppx      I have performed a physical exam and reviewed and updated ROS and Plan today (2/2/2025). In review of yesterday's note (2/1/2025), there are no changes except as documented above.  Total time spent 44 minutes. I spent greater than 50% of the time for patient care, counseling, and coordination on this date, including unit/floor time, and face-to-face time with the patient as per interval events, my own review of patient's imaging and lab analysis and developing my assessment and plan above.

## 2025-02-03 NOTE — CARE PLAN
The patient is Stable - Low risk of patient condition declining or worsening    Shift Goals  Clinical Goals: Patient pain will be <4 throughout shift, monitor blood glucose  Patient Goals: Rest  Family Goals: KATHERINE    Patient a/o x4. Patient stated pain 7/10 earlier. Pain meds given. Patient had 2 bowel movements today. Call light and belongings are within reach. Bed is in low locked position.     Progress made toward(s) clinical / shift goals:    Problem: Safety  Goal: Will remain free from injury  Outcome: Progressing  Goal: Will remain free from falls  Outcome: Progressing     Problem: Pain Management  Goal: Pain level will decrease to patient's comfort goal  Outcome: Progressing     Problem: Infection  Goal: Will remain free from infection  Outcome: Progressing     Problem: Discharge Barriers/Planning  Goal: Patient's continuum of care needs are met  Outcome: Progressing     Problem: Bowel Elimination  Goal: Establish and maintain regular bowel function  Outcome: Progressing     Problem: Mobility  Goal: Patient's capacity to carry out activities will improve  Outcome: Progressing     Problem: Self Care  Goal: Patient will have the ability to perform ADLs independently or with assistance (bathe, groom, dress, toilet and feed)  Outcome: Progressing     Problem: Skin Integrity  Goal: Risk for impaired skin integrity will decrease  Outcome: Progressing       Patient is not progressing towards the following goals:

## 2025-02-03 NOTE — DISCHARGE PLANNING
Case Management Discharge Planning    Admission Date: 1/9/2025  GMLOS: 5.2  ALOS: 24    6-Clicks ADL Score: 18  6-Clicks Mobility Score: 20      Anticipated Discharge Dispo: Discharge Disposition: Discharged to home/self care (01)    This RN CM completed chart review and patient is pending [facility] set up in [place] with insulin pump and glucose monitor set up.     This RN CM called sister Delia and confirmed that she received email with [facility] list. Per sister they are interested in [facility]:    [address] [address] [phone]    Sister Minda met with relator last Saturday and there is a  interested in purchasing mobile home. They will have more updates by Wednesday this week.     This RN CM reached out to pharmacist to follow up on progress for insulin pump and glucose monitor.     This RN CM spoke with pharmacist and they are going to try oral medications for BG control before working on insulin pump and continuous glucose monitor. Diabetes educator agreed and made recommendations for oral medications to be ordered for patient.

## 2025-02-04 ENCOUNTER — APPOINTMENT (OUTPATIENT)
Dept: RADIOLOGY | Facility: MEDICAL CENTER | Age: 60
DRG: 542 | End: 2025-02-04
Attending: STUDENT IN AN ORGANIZED HEALTH CARE EDUCATION/TRAINING PROGRAM
Payer: COMMERCIAL

## 2025-02-04 LAB
GLUCOSE BLD STRIP.AUTO-MCNC: 132 MG/DL (ref 65–99)
GLUCOSE BLD STRIP.AUTO-MCNC: 133 MG/DL (ref 65–99)
GLUCOSE BLD STRIP.AUTO-MCNC: 156 MG/DL (ref 65–99)
GLUCOSE BLD STRIP.AUTO-MCNC: 171 MG/DL (ref 65–99)
GLUCOSE BLD STRIP.AUTO-MCNC: 215 MG/DL (ref 65–99)

## 2025-02-04 PROCEDURE — 99233 SBSQ HOSP IP/OBS HIGH 50: CPT | Performed by: STUDENT IN AN ORGANIZED HEALTH CARE EDUCATION/TRAINING PROGRAM

## 2025-02-04 PROCEDURE — 700102 HCHG RX REV CODE 250 W/ 637 OVERRIDE(OP): Performed by: NURSE PRACTITIONER

## 2025-02-04 PROCEDURE — A9270 NON-COVERED ITEM OR SERVICE: HCPCS | Performed by: STUDENT IN AN ORGANIZED HEALTH CARE EDUCATION/TRAINING PROGRAM

## 2025-02-04 PROCEDURE — 770006 HCHG ROOM/CARE - MED/SURG/GYN SEMI*

## 2025-02-04 PROCEDURE — 700102 HCHG RX REV CODE 250 W/ 637 OVERRIDE(OP): Performed by: STUDENT IN AN ORGANIZED HEALTH CARE EDUCATION/TRAINING PROGRAM

## 2025-02-04 PROCEDURE — 700101 HCHG RX REV CODE 250: Performed by: NURSE PRACTITIONER

## 2025-02-04 PROCEDURE — 82962 GLUCOSE BLOOD TEST: CPT

## 2025-02-04 PROCEDURE — 700102 HCHG RX REV CODE 250 W/ 637 OVERRIDE(OP): Performed by: INTERNAL MEDICINE

## 2025-02-04 PROCEDURE — 700111 HCHG RX REV CODE 636 W/ 250 OVERRIDE (IP): Mod: JZ | Performed by: INTERNAL MEDICINE

## 2025-02-04 PROCEDURE — A9270 NON-COVERED ITEM OR SERVICE: HCPCS

## 2025-02-04 PROCEDURE — 90832 PSYTX W PT 30 MINUTES: CPT | Performed by: SOCIAL WORKER

## 2025-02-04 PROCEDURE — A9270 NON-COVERED ITEM OR SERVICE: HCPCS | Performed by: INTERNAL MEDICINE

## 2025-02-04 PROCEDURE — 700102 HCHG RX REV CODE 250 W/ 637 OVERRIDE(OP)

## 2025-02-04 PROCEDURE — 73030 X-RAY EXAM OF SHOULDER: CPT | Mod: RT

## 2025-02-04 PROCEDURE — A9270 NON-COVERED ITEM OR SERVICE: HCPCS | Performed by: NURSE PRACTITIONER

## 2025-02-04 RX ORDER — GLIPIZIDE 5 MG/1
5 TABLET ORAL
Status: DISCONTINUED | OUTPATIENT
Start: 2025-02-04 | End: 2025-02-06

## 2025-02-04 RX ADMIN — VENLAFAXINE HYDROCHLORIDE 75 MG: 75 TABLET ORAL at 05:05

## 2025-02-04 RX ADMIN — ENOXAPARIN SODIUM 40 MG: 100 INJECTION SUBCUTANEOUS at 16:43

## 2025-02-04 RX ADMIN — OXYCODONE 5 MG: 5 TABLET ORAL at 08:39

## 2025-02-04 RX ADMIN — GABAPENTIN 200 MG: 100 CAPSULE ORAL at 13:14

## 2025-02-04 RX ADMIN — OMEPRAZOLE 40 MG: 20 CAPSULE, DELAYED RELEASE ORAL at 05:05

## 2025-02-04 RX ADMIN — OMEPRAZOLE 40 MG: 20 CAPSULE, DELAYED RELEASE ORAL at 16:43

## 2025-02-04 RX ADMIN — OXYCODONE 5 MG: 5 TABLET ORAL at 16:51

## 2025-02-04 RX ADMIN — INSULIN LISPRO 1 UNITS: 100 INJECTION, SOLUTION INTRAVENOUS; SUBCUTANEOUS at 20:41

## 2025-02-04 RX ADMIN — SITAGLIPTIN 100 MG: 100 TABLET, FILM COATED ORAL at 16:43

## 2025-02-04 RX ADMIN — GABAPENTIN 200 MG: 100 CAPSULE ORAL at 18:13

## 2025-02-04 RX ADMIN — LIDOCAINE 3 PATCH: 4 PATCH TOPICAL at 18:14

## 2025-02-04 RX ADMIN — GABAPENTIN 200 MG: 100 CAPSULE ORAL at 05:05

## 2025-02-04 RX ADMIN — INSULIN LISPRO 1 UNITS: 100 INJECTION, SOLUTION INTRAVENOUS; SUBCUTANEOUS at 18:09

## 2025-02-04 RX ADMIN — GLIPIZIDE 5 MG: 5 TABLET ORAL at 10:05

## 2025-02-04 ASSESSMENT — ENCOUNTER SYMPTOMS
NERVOUS/ANXIOUS: 0
SHORTNESS OF BREATH: 1
CHILLS: 0
ABDOMINAL PAIN: 0
WEAKNESS: 1
SPUTUM PRODUCTION: 0
DEPRESSION: 0
DIAPHORESIS: 0
DIZZINESS: 0
DOUBLE VISION: 0
BLURRED VISION: 0
PALPITATIONS: 0
NAUSEA: 0
WHEEZING: 0
BACK PAIN: 1
FEVER: 0
HEADACHES: 0
COUGH: 1
MYALGIAS: 1

## 2025-02-04 ASSESSMENT — PAIN DESCRIPTION - PAIN TYPE
TYPE: ACUTE PAIN

## 2025-02-04 NOTE — PROGRESS NOTES
Hospital Medicine Daily Progress Note    Date of Service  2/3/2025    Chief Complaint  Christoph Taylor is a 59 y.o. male admitted 1/9/2025 with weakness.    Hospital Course  Christoph Taylor is a 59-year-old male with prior suicidal ideation, poorly controlled diabetes mellitus type 2, housing insecurity and severe protein calorie malnutrition.  Admitted 1/9 for acute on chronic back pain.    Patient states that he is unable to take care of himself and has a hard time administering insulin, buying food for himself, taking his blood sugars. Additionally says that he has worsening neuropathy of the feet and hands. His pain is worse than ever. He has no additional help here.    In the ER he was found to be hyperglycemic and quite weak.  UA was concerning for possible UTI.  Patient was given 1 dose of IV cefazolin.  However, he denied urinary symptoms and antibiotics were not continued.     In terms of patient's weakness-MRI thoracic and lumbar notable for old compression fracture at T12 and multiple disc bulging without quinton central canal stenosis or cord impingement.  Neurosurgery was consulted.  Dr. Johnson recommending non operative management and outpatient follow-up for possible epidural injection.  PT OT recommending postacute rehabilitation.  However, no accepting facilities were identified.  Patient intermittently refusing to work with PT here.    Home health has been arranged prior to discharge.    Interval Problem Update  1/14: Vitals stable overnight.  -129.  Patient is medically clear for discharge pending placement.    1/15: Vitals remained stable.   through 156.  Glucose ranging 104 through 246.  Case management is working hard to obtain patient's insurance card to evaluate benefits for postacute rehabilitation.  Patient is medically cleared to discharge once insurance barrier has been resolved.    1/16: Vitals stable.  Glucose this a.m. 176.  Range 2 29 through 3 05.  Increase Lantus to  25 units in the p.m.  Multiple long discussions with patient, case management and nursing staff regarding discharge plan.  Patient feels unsafe to discharge to his home.  He has 5 steps.  He does not have a ramp.  He is largely wheelchair-bound due to chronic pain.    1/17: Vital stable overnight.  -130.  Glucose improved with increase Lantus dosing.  A.m. blood sugar 104.  Patient is now being followed by complex discharge committee.    1/18: Vitals notable for SBP .  Glucose ranging 125 through 196.  Patient required 1 unit sliding scale insulin yesterday.  Patient remains medically clear for discharge though a safe discharge plan has not been arranged yet.  Encouraged ambulation at least 3 times daily.    1/19: Vital stable overnight.   through 126.  Glucose range .  Required 2 units SSI over last 24 hours.    1/20: Vitals stable overnight.  Patient endorsing depression and refusal to participate in medical therapies.  I have placed a referral for inpatient psychology/behavioral health.  Increase venlafaxine to 75 mg.  Continue to encourage participation in ambulation and patient rehabilitation.    1/21 Patient able to self transfer to chair, reports ongoing low back pain, pain level of 7/10  States that he is now wheelchair-bound, pending placement,  to assist  Patient with hypoglycemic episode this a.m. was given juice, unable to self administer insulin at home, will need placement assistance    1/22 no new events, sleeping arousable, AFO delivered for PT reeval    1/23 reports low back pain, able to transfer to chair, PT blanca    1/24 ongoing lbp, min activity, Has not worked with PT with AFO, to reconsult  Improving blood sugar    1/25 patient with hypoglycemia this morning, blood sugar of 69, arousable improved after dextrose administration, Lantus decreased patient did work with PT with AFO, encourage activity    1/26 improved blood sugar, patient sitting up in chair, no  new complaints  Patient did have increased activity with AFO, continue to encourage activity    1/27 patient was noted to be hypotensive with increasing respiratory failure requiring 4-5 L oxygen supplementation, chest x-ray with infiltrates  Patient was started on septic protocol, given 4 L of IV fluid hydration as well as initiated on IV antibiotics, UA is negative  Patient does not recall events of last evening, patient feels mild low back pain, patient does report dry cough, still on 4 to 5 L, taper and oxygen needs  Encourage activity    1/28 TAKING OVER CARE  Afebrile, normal HR/RR, 's-140's, SpO2 95-97% on 2 L NC.  No leukocytosis, stable anemia, mild thrombocytopenia.  LFT's improving, blood cultures remain negative.  Repeat procalcitonin in the morning, check CRP, CBC.  Suspect will be de-escalating antibiotics tomorrow.  Feeling general fatigue.  Tolerating p.o. intake.    1/29  Looking a bit better today.  Afebrile normal pulse and respiratory rate normal blood pressure, weaned off of supplemental oxygen today.  Procalcitonin checked from 30 down to 16, continue antibiotics for now.  Check magnesium, 1.3, IV replacement ordered.  Having some aches and pains, analgesia adjusted.  Tolerating p.o. intake.    1/30  Afebrile with normal vitals, weaned down to room air.  Sitting up edge of bed eating his meal, no acute complaints, tolerating p.o. intake.  Will work with pharmacy to see about ordering him a continuous glucose monitor as well as insulin pump to help alleviate any insulin administration concerns holding him up from getting into a group home.  Patient was interested in this idea as well.    1/31  Will order insulin pump and CGM and see if we can get it through approved services which could help him get discharged to a group home.   No new complaints, tolerating diet.     2/2  Tolerating diet, no acute complaints, normal vitals.  Will need to see if we can get approved services for CGM and  pump (working with pharmacist to get it ordered) to help facilitate a discharge to group home.    2/3  Patient doing well, no acute changes or complaints.  Discussed with diabetes educator if insulin pump and CGM would work, apparently the plan will not work.  Working with pharmacy to see if we can of the p.o. regimen to help control his diabetes so that he does not have to rely on insulin as it appears that has been a barrier to get him to outpatient living arrangements.  Work with pharmacy to adjust p.o. regimen.    I have discussed this patient's plan of care and discharge plan at IDT rounds today with Case Management, Nursing, Nursing leadership, and other members of the IDT team.    Consultants/Specialty  None at this time     Code Status  Full Code    Disposition  Medically Cleared  I have placed the appropriate orders for post-discharge needs.    Review of Systems  Review of Systems   Constitutional:  Positive for malaise/fatigue. Negative for chills, diaphoresis and fever.   Eyes:  Negative for blurred vision and double vision.   Respiratory:  Positive for cough and shortness of breath. Negative for sputum production and wheezing.    Cardiovascular:  Negative for palpitations.   Gastrointestinal:  Negative for abdominal pain and nausea.   Genitourinary:  Negative for dysuria.   Musculoskeletal:  Positive for back pain and myalgias.   Neurological:  Positive for weakness. Negative for dizziness and headaches.   Psychiatric/Behavioral:  Negative for depression. The patient is not nervous/anxious.         Physical Exam  Temp:  [36.1 °C (96.9 °F)-36.5 °C (97.7 °F)] 36.3 °C (97.4 °F)  Pulse:  [71-81] 71  Resp:  [16-17] 16  BP: (102-142)/(70-85) 120/70  SpO2:  [91 %-95 %] 94 %    Physical Exam  Vitals and nursing note reviewed.   Constitutional:       General: He is not in acute distress.     Appearance: Normal appearance. He is not ill-appearing, toxic-appearing or diaphoretic.   HENT:      Mouth/Throat:       Mouth: Mucous membranes are moist.      Pharynx: Oropharynx is clear.   Eyes:      General: No scleral icterus.     Extraocular Movements: Extraocular movements intact.      Conjunctiva/sclera: Conjunctivae normal.   Cardiovascular:      Rate and Rhythm: Regular rhythm.      Pulses: Normal pulses.   Pulmonary:      Effort: Pulmonary effort is normal. No respiratory distress.      Breath sounds: No wheezing, rhonchi or rales.   Abdominal:      General: There is no distension.      Tenderness: There is no abdominal tenderness. There is no guarding or rebound.   Musculoskeletal:         General: No swelling or tenderness.      Cervical back: Normal range of motion. No rigidity or tenderness.   Skin:     General: Skin is warm.      Coloration: Skin is not jaundiced.      Findings: No erythema.   Neurological:      Mental Status: He is alert and oriented to person, place, and time. Mental status is at baseline.      Cranial Nerves: No cranial nerve deficit.   Psychiatric:         Mood and Affect: Mood normal.         Behavior: Behavior normal.         Thought Content: Thought content normal.         Judgment: Judgment normal.         Fluids    Intake/Output Summary (Last 24 hours) at 2/4/2025 0611  Last data filed at 2/4/2025 0250  Gross per 24 hour   Intake 720 ml   Output 1550 ml   Net -830 ml        Laboratory                              Imaging  [id number]-RUQ   Final Result      1.  Distended gallbladder with gallbladder sludge and a small calcified gallstone. No discrete sonographic evidence of acute cholecystitis.   2.  Mildly dilated main portal vein which could indicate portal hypertension      DX-CHEST-PORTABLE (1 VIEW)   Final Result      Left basilar atelectasis. Underlying infection is possible.      MR-LUMBAR SPINE-W/O   Final Result      1.  T12 chronic compression fracture with anterior wedge deformity.   2.  Postoperative changes with interval L4-5 laminectomy since prior MRI study. There is now laminectomy at  L4-5-S1 and there is now posterior fusion with transpedicular screw fixation at L4-L5. Relief of previously seen severe central stenosis at L4-5.   3.  Multilevel degenerative changes as detailed for each level above in the body of report.      MR-THORACIC SPINE-W/O   Final Result      1.  T12 old compression fracture with anterior wedge deformity. Associated mild lower thoracic kyphosis.   2.  T2 old superior endplate compression fracture.   3.  Multilevel disc bulges T3-T4, T7-T8, T8-T9, T11-12, T12-L1. Variable mass effect on the ventral thecal sac without quinton central stenosis or cord impingement at any thoracic level.   4.  No myelopathic cord signal at any thoracic level.      DX-CHEST-PORTABLE (1 VIEW)   Final Result      No acute cardiopulmonary disease evident.           Assessment/Plan  * Compression fracture of body of thoracic vertebra (HCC)- (present on admission)  Assessment & Plan  MRI T-spine with T12 old compression fracture, T2 old superior endplate compression fracture, multiple level disc bulges T3-4 T7-T8 T8-T9, T11-T12, variable mass effect with out  central stenosis or cord impingement, MRI L-spine with T12 compression fracture, previous L4-L5 laminectomy, multilevel degenerative changes.  - Neurosurgery has been consulted  - Recommending non operative management at this time  - Continue to follow-up outpatient for possible epidural  - Continue gabapentin, lidocaine and oxycodone as needed for pain management  - PT OT recommending postacute rehabilitation  - I personally reviewed MRI images with patient at bedside 1/16 1/21 placement pending, f/u with CM  - ongoing LBP, schedule flexeril    1/22 PT reeval with AFO now available  Pain control, encourage activity, placement pending    1/23 PT  with AFO, encourage activity, pending placement, CM to assist, ? GH  1/24 PT to work with patient with AFO, consult replaced  Encourage activity    1/25 patient worked with PT with AFO, encourage  activity    1/26 cont activity, placement pending    1/27 difficult discharge,  assisting  Considering transfer to another state to be closer to family, patient can no longer live alone, cannot self administer insulin or take care of himself  Encourage activity  Placement pending    Sepsis ([facility])  Assessment & Plan  This is Sepsis Not present on admission  SIRS criteria identified on my evaluation include: Tachycardia, with heart rate greater than 90 BPM and Tachypnea, with respirations greater than 20 per minute  Clinical indicators of end organ dysfunction include Hypotension with systolic blood pressure less than 90 or MAP less than 65, Lactic Acid greater than 2, and Acute Respiratory Failure - (mechanical ventilation or BiPap or PaO2/FiO2 ratio < 300)  Source is pneumonia/lungs  Sepsis protocol initiated  Crystalloid Fluid Administration: Fluid resuscitation ordered per standard protocol - 30 mL/kg per current or ideal body weight  IV antibiotics as appropriate for source of sepsis  Reassessment: I have reassessed the patient's hemodynamic status    1/27 septic protocol initiated, received 4 L of fluid hydration, improved blood pressure, lactic acidosis resolved, will follow-up blood a.m. lactic acid level, repeat is at 2  Borderline hypotension, continue fluid hydration  Patient now on IV antibiotics, follow-up cultures  Taper oxygen as tolerated, chest x-ray concerning for infiltrates    Moderate episode of recurrent major depressive disorder (HCC)  Assessment & Plan  Patient endorsing depression.  Unwillingness to participate in his rehabilitation.  Endorses feelings of helplessness and hopelessness.  Per nursing staff he has expressed SI without a plan.  He does not endorse SI to me; on 2 separate occasions.  - Continue to encourage patient to participate in his rehabilitation.  - Increase venlafaxine to 75 mg  - Inpatient behavioral health referral has been placed.  - I do not see an indication  to initiate a legal hold at this point.  Patient denies SI and SI plan to me.  Per nursing staff patient does become agitated when his snacks are withheld.  We will continue to monitor the situation closely.    Bacteriuria- (present on admission)  Assessment & Plan  No abx's for now given lack of symptoms    Physical debility- (present on admission)  Assessment & Plan  MRI with compression T2 and T12 fracture, multilevel bulging disc through thoracic spine.  No cord impingement.  Neurosurgery recommended nonoperative management  Follow-up for epidural injection as outpatient  Placement is pending    1/21 Patient reports ambulatory status prior to this admission, to follow-up with orthopedic surgery for AFO evaluation  , Will reconsult PT OT for AFO as needed, will consult orthopedic surgery as needed  -Patient reports low back pain, has Zanaflex as needed, will add Flexeril 3 times daily  Continue pain control  Encourage activity    Acute hypoxic respiratory failure (HCC)- (present on admission)  Assessment & Plan  Resolved  Now on room air, continue to monitor oxygen requirement    Elevated troponin- (present on admission)  Assessment & Plan  Chronically elevated. Baseline  - no further workup    Uncontrolled type 2 diabetes mellitus with hyperglycemia (HCC)- (present on admission)  Assessment & Plan  A1C 9.8  - Continue Lantus to 25 units in the evening  - Continue sliding scale insulin; has required 2 units SSI in last 24 hours  - Patient states he is unable to drop insulin at home due to hand coordination issues.  We will try to minimize sliding scale insulin and discharged with only Lantus pens.  - Monitor BG trend.   - Accu-Cheks before meals and at bedtime.   - Goal to keep BG between 140-180 per 2019 ADA guidelines     1/21 hypoglycemia this am, decrease Lantus to 18 units, cont ssi  - f/u am labs    1/22 bs improved, on lantus 18 units, cont and monitor  ? GH    1/23 improvng bs, increase Lantus 20 u, add  metformin, monitor    1/24 improving, cont lantus, f/u am bmp, consider increasing metformin    1/25 hypoglycemia this a.m., decreased metformin and Lantus, monitor closely    1/26 improved bs, now on lantus 10u, monitor, encourage activity    1/27 improved control, continue Lantus    CKD (chronic kidney disease)- (present on admission)  Assessment & Plan  Creatinine stable at 0.57  Stable, secondary to diabetes       VTE prophylaxis:    enoxaparin ppx      I have performed a physical exam and reviewed and updated ROS and Plan today (2/3/2025). In review of yesterday's note (2/2/2025), there are no changes except as documented above.  Total time spent 46 minutes. I spent greater than 50% of the time for patient care, counseling, and coordination on this date, including unit/floor time, and face-to-face time with the patient as per interval events, my own review of patient's imaging and lab analysis and developing my assessment and plan above.

## 2025-02-04 NOTE — CARE PLAN
The patient is Stable - Low risk of patient condition declining or worsening    Shift Goals  Clinical Goals: Pts pain will remain < 5 below shift  Patient Goals: Rest and comfort  Family Goals: KATHERINE    Progress made toward(s) clinical / shift goals:  Pt pain remained < 5 throughout shift with repositioning and comfort measures      Problem: Safety  Goal: Will remain free from injury  Outcome: Progressing  Goal: Will remain free from falls  Outcome: Progressing     Problem: Pain Management  Goal: Pain level will decrease to patient's comfort goal  Outcome: Progressing     Problem: Infection  Goal: Will remain free from infection  Outcome: Progressing       Patient is not progressing towards the following goals:

## 2025-02-04 NOTE — CARE PLAN
The patient is Stable - Low risk of patient condition declining or worsening    Shift Goals  Clinical Goals: Pts pain will be managed throughout shift  Patient Goals: rest; pain control  Family Goals: KATHERINE    Progress made toward(s) clinical / shift goals:  Pts pain will be managed throughout shift due to communication, medication administration, turning, repositioning, education, and hourly rounding.    Patient is not progressing towards the following goals: NA

## 2025-02-05 LAB
ANION GAP SERPL CALC-SCNC: 7 MMOL/L (ref 7–16)
BUN SERPL-MCNC: 28 MG/DL (ref 8–22)
CALCIUM SERPL-MCNC: 8.8 MG/DL (ref 8.5–10.5)
CHLORIDE SERPL-SCNC: 100 MMOL/L (ref 96–112)
CO2 SERPL-SCNC: 32 MMOL/L (ref 20–33)
CREAT SERPL-MCNC: 0.75 MG/DL (ref 0.5–1.4)
ERYTHROCYTE [DISTWIDTH] IN BLOOD BY AUTOMATED COUNT: 40.2 FL (ref 35.9–50)
GFR SERPLBLD CREATININE-BSD FMLA CKD-EPI: 103 ML/MIN/1.73 M 2
GLUCOSE BLD STRIP.AUTO-MCNC: 169 MG/DL (ref 65–99)
GLUCOSE BLD STRIP.AUTO-MCNC: 187 MG/DL (ref 65–99)
GLUCOSE BLD STRIP.AUTO-MCNC: 200 MG/DL (ref 65–99)
GLUCOSE BLD STRIP.AUTO-MCNC: 221 MG/DL (ref 65–99)
GLUCOSE SERPL-MCNC: 153 MG/DL (ref 65–99)
HCT VFR BLD AUTO: 33.7 % (ref 42–52)
HGB BLD-MCNC: 10.6 G/DL (ref 14–18)
MAGNESIUM SERPL-MCNC: 1.7 MG/DL (ref 1.5–2.5)
MCH RBC QN AUTO: 25.4 PG (ref 27–33)
MCHC RBC AUTO-ENTMCNC: 31.5 G/DL (ref 32.3–36.5)
MCV RBC AUTO: 80.8 FL (ref 81.4–97.8)
PHOSPHATE SERPL-MCNC: 3.6 MG/DL (ref 2.5–4.5)
PLATELET # BLD AUTO: 255 K/UL (ref 164–446)
PMV BLD AUTO: 8.8 FL (ref 9–12.9)
POTASSIUM SERPL-SCNC: 4.3 MMOL/L (ref 3.6–5.5)
RBC # BLD AUTO: 4.17 M/UL (ref 4.7–6.1)
SODIUM SERPL-SCNC: 139 MMOL/L (ref 135–145)
WBC # BLD AUTO: 4.5 K/UL (ref 4.8–10.8)

## 2025-02-05 PROCEDURE — 700102 HCHG RX REV CODE 250 W/ 637 OVERRIDE(OP): Performed by: INTERNAL MEDICINE

## 2025-02-05 PROCEDURE — 700102 HCHG RX REV CODE 250 W/ 637 OVERRIDE(OP): Performed by: NURSE PRACTITIONER

## 2025-02-05 PROCEDURE — A9270 NON-COVERED ITEM OR SERVICE: HCPCS | Performed by: STUDENT IN AN ORGANIZED HEALTH CARE EDUCATION/TRAINING PROGRAM

## 2025-02-05 PROCEDURE — 700102 HCHG RX REV CODE 250 W/ 637 OVERRIDE(OP): Performed by: STUDENT IN AN ORGANIZED HEALTH CARE EDUCATION/TRAINING PROGRAM

## 2025-02-05 PROCEDURE — 36415 COLL VENOUS BLD VENIPUNCTURE: CPT

## 2025-02-05 PROCEDURE — 700111 HCHG RX REV CODE 636 W/ 250 OVERRIDE (IP): Mod: JZ | Performed by: INTERNAL MEDICINE

## 2025-02-05 PROCEDURE — 700101 HCHG RX REV CODE 250: Performed by: NURSE PRACTITIONER

## 2025-02-05 PROCEDURE — 84100 ASSAY OF PHOSPHORUS: CPT

## 2025-02-05 PROCEDURE — 99232 SBSQ HOSP IP/OBS MODERATE 35: CPT | Performed by: STUDENT IN AN ORGANIZED HEALTH CARE EDUCATION/TRAINING PROGRAM

## 2025-02-05 PROCEDURE — A9270 NON-COVERED ITEM OR SERVICE: HCPCS

## 2025-02-05 PROCEDURE — 80048 BASIC METABOLIC PNL TOTAL CA: CPT

## 2025-02-05 PROCEDURE — A9270 NON-COVERED ITEM OR SERVICE: HCPCS | Performed by: INTERNAL MEDICINE

## 2025-02-05 PROCEDURE — 85027 COMPLETE CBC AUTOMATED: CPT

## 2025-02-05 PROCEDURE — 770006 HCHG ROOM/CARE - MED/SURG/GYN SEMI*

## 2025-02-05 PROCEDURE — 700102 HCHG RX REV CODE 250 W/ 637 OVERRIDE(OP)

## 2025-02-05 PROCEDURE — A9270 NON-COVERED ITEM OR SERVICE: HCPCS | Performed by: NURSE PRACTITIONER

## 2025-02-05 PROCEDURE — 83735 ASSAY OF MAGNESIUM: CPT

## 2025-02-05 PROCEDURE — 82962 GLUCOSE BLOOD TEST: CPT

## 2025-02-05 RX ADMIN — GABAPENTIN 200 MG: 100 CAPSULE ORAL at 13:16

## 2025-02-05 RX ADMIN — ENOXAPARIN SODIUM 40 MG: 100 INJECTION SUBCUTANEOUS at 18:36

## 2025-02-05 RX ADMIN — INSULIN LISPRO 2 UNITS: 100 INJECTION, SOLUTION INTRAVENOUS; SUBCUTANEOUS at 21:27

## 2025-02-05 RX ADMIN — INSULIN LISPRO 1 UNITS: 100 INJECTION, SOLUTION INTRAVENOUS; SUBCUTANEOUS at 08:13

## 2025-02-05 RX ADMIN — OMEPRAZOLE 40 MG: 20 CAPSULE, DELAYED RELEASE ORAL at 04:21

## 2025-02-05 RX ADMIN — OMEPRAZOLE 40 MG: 20 CAPSULE, DELAYED RELEASE ORAL at 18:37

## 2025-02-05 RX ADMIN — SITAGLIPTIN 100 MG: 100 TABLET, FILM COATED ORAL at 18:38

## 2025-02-05 RX ADMIN — INSULIN LISPRO 1 UNITS: 100 INJECTION, SOLUTION INTRAVENOUS; SUBCUTANEOUS at 13:07

## 2025-02-05 RX ADMIN — OXYCODONE 5 MG: 5 TABLET ORAL at 08:17

## 2025-02-05 RX ADMIN — GLIPIZIDE 5 MG: 5 TABLET ORAL at 08:16

## 2025-02-05 RX ADMIN — VENLAFAXINE HYDROCHLORIDE 75 MG: 75 TABLET ORAL at 04:21

## 2025-02-05 RX ADMIN — INSULIN LISPRO 1 UNITS: 100 INJECTION, SOLUTION INTRAVENOUS; SUBCUTANEOUS at 18:33

## 2025-02-05 RX ADMIN — GABAPENTIN 200 MG: 100 CAPSULE ORAL at 18:37

## 2025-02-05 RX ADMIN — LIDOCAINE 3 PATCH: 4 PATCH TOPICAL at 18:37

## 2025-02-05 RX ADMIN — GABAPENTIN 200 MG: 100 CAPSULE ORAL at 04:21

## 2025-02-05 RX ADMIN — OXYCODONE 5 MG: 5 TABLET ORAL at 18:42

## 2025-02-05 ASSESSMENT — ENCOUNTER SYMPTOMS
DIAPHORESIS: 0
BLURRED VISION: 0
WHEEZING: 0
HEADACHES: 0
DEPRESSION: 0
CHILLS: 0
MYALGIAS: 1
DOUBLE VISION: 0
NERVOUS/ANXIOUS: 0
ABDOMINAL PAIN: 0
COUGH: 1
BACK PAIN: 1
PALPITATIONS: 0
NAUSEA: 0
FEVER: 0
SHORTNESS OF BREATH: 1
SPUTUM PRODUCTION: 0
WEAKNESS: 1
DIZZINESS: 0

## 2025-02-05 ASSESSMENT — PAIN DESCRIPTION - PAIN TYPE
TYPE: ACUTE PAIN

## 2025-02-05 NOTE — CARE PLAN
The patient is Stable - Low risk of patient condition declining or worsening    Shift Goals  Clinical Goals: Patient will remain free from falls and injury throughout shift  Patient Goals: rest, eat  Family Goals: KATHERINE    Progress made toward(s) clinical / shift goals:  Fall precautions in place. Reinforced education on use of call light. Patient did remain free from falls and injury.    Patient is not progressing towards the following goals: progressing

## 2025-02-05 NOTE — PROGRESS NOTES
Hospital Medicine Daily Progress Note    Date of Service      Chief Complaint  Christoph Taylor is a 59 y.o. male admitted 1/9/2025 with weakness.    Hospital Course  Christoph Taylor is a 59-year-old male with prior suicidal ideation, poorly controlled diabetes mellitus type 2, housing insecurity and severe protein calorie malnutrition.  Admitted 1/9 for acute on chronic back pain.    Patient states that he is unable to take care of himself and has a hard time administering insulin, buying food for himself, taking his blood sugars. Additionally says that he has worsening neuropathy of the feet and hands. His pain is worse than ever. He has no additional help here.    In the ER he was found to be hyperglycemic and quite weak.  UA was concerning for possible UTI.  Patient was given 1 dose of IV cefazolin.  However, he denied urinary symptoms and antibiotics were not continued.     In terms of patient's weakness-MRI thoracic and lumbar notable for old compression fracture at T12 and multiple disc bulging without quinton central canal stenosis or cord impingement.  Neurosurgery was consulted.  Dr. Johnson recommending non operative management and outpatient follow-up for possible epidural injection.  PT OT recommending postacute rehabilitation.  However, no accepting facilities were identified.  Patient intermittently refusing to work with PT here.    Home health has been arranged prior to discharge.    Interval Problem Update  1/14: Vitals stable overnight.  -129.  Patient is medically clear for discharge pending placement.    1/15: Vitals remained stable.   through 156.  Glucose ranging 104 through 246.  Case management is working hard to obtain patient's insurance card to evaluate benefits for postacute rehabilitation.  Patient is medically cleared to discharge once insurance barrier has been resolved.    1/16: Vitals stable.  Glucose this a.m. 176.  Range 2 29 through 3 05.  Increase Lantus to 25 units  in the p.m.  Multiple long discussions with patient, case management and nursing staff regarding discharge plan.  Patient feels unsafe to discharge to his home.  He has 5 steps.  He does not have a ramp.  He is largely wheelchair-bound due to chronic pain.    1/17: Vital stable overnight.  -130.  Glucose improved with increase Lantus dosing.  A.m. blood sugar 104.  Patient is now being followed by complex discharge committee.    1/18: Vitals notable for SBP .  Glucose ranging 125 through 196.  Patient required 1 unit sliding scale insulin yesterday.  Patient remains medically clear for discharge though a safe discharge plan has not been arranged yet.  Encouraged ambulation at least 3 times daily.    1/19: Vital stable overnight.   through 126.  Glucose range .  Required 2 units SSI over last 24 hours.    1/20: Vitals stable overnight.  Patient endorsing depression and refusal to participate in medical therapies.  I have placed a referral for inpatient psychology/behavioral health.  Increase venlafaxine to 75 mg.  Continue to encourage participation in ambulation and patient rehabilitation.    1/21 Patient able to self transfer to chair, reports ongoing low back pain, pain level of 7/10  States that he is now wheelchair-bound, pending placement,  to assist  Patient with hypoglycemic episode this a.m. was given juice, unable to self administer insulin at home, will need placement assistance    1/22 no new events, sleeping arousable, AFO delivered for PT reeval    1/23 reports low back pain, able to transfer to chair, PT eval    1/24 ongoing lbp, min activity, Has not worked with PT with AFO, to reconsult  Improving blood sugar    1/25 patient with hypoglycemia this morning, blood sugar of 69, arousable improved after dextrose administration, Lantus decreased patient did work with PT with AFO, encourage activity    1/26 improved blood sugar, patient sitting up in chair, no new  complaints  Patient did have increased activity with AFO, continue to encourage activity    1/27 patient was noted to be hypotensive with increasing respiratory failure requiring 4-5 L oxygen supplementation, chest x-ray with infiltrates  Patient was started on septic protocol, given 4 L of IV fluid hydration as well as initiated on IV antibiotics, UA is negative  Patient does not recall events of last evening, patient feels mild low back pain, patient does report dry cough, still on 4 to 5 L, taper and oxygen needs  Encourage activity    1/28 TAKING OVER CARE  Afebrile, normal HR/RR, 's-140's, SpO2 95-97% on 2 L NC.  No leukocytosis, stable anemia, mild thrombocytopenia.  LFT's improving, blood cultures remain negative.  Repeat procalcitonin in the morning, check CRP, CBC.  Suspect will be de-escalating antibiotics tomorrow.  Feeling general fatigue.  Tolerating p.o. intake.    1/29  Looking a bit better today.  Afebrile normal pulse and respiratory rate normal blood pressure, weaned off of supplemental oxygen today.  Procalcitonin checked from 30 down to 16, continue antibiotics for now.  Check magnesium, 1.3, IV replacement ordered.  Having some aches and pains, analgesia adjusted.  Tolerating p.o. intake.    1/30  Afebrile with normal vitals, weaned down to room air.  Sitting up edge of bed eating his meal, no acute complaints, tolerating p.o. intake.  Will work with pharmacy to see about ordering him a continuous glucose monitor as well as insulin pump to help alleviate any insulin administration concerns holding him up from getting into a group home.  Patient was interested in this idea as well.    1/31  Will order insulin pump and CGM and see if we can get it through approved services which could help him get discharged to a group home.   No new complaints, tolerating diet.     2/2  Tolerating diet, no acute complaints, normal vitals.  Will need to see if we can get approved services for CGM and pump  (working with pharmacist to get it ordered) to help facilitate a discharge to group home.    2/3  Patient doing well, no acute changes or complaints.  Discussed with diabetes educator if insulin pump and CGM would work, apparently the plan will not work.  Working with pharmacy to see if we can of the p.o. regimen to help control his diabetes so that he does not have to rely on insulin as it appears that has been a barrier to get him to outpatient living arrangements.  Work with pharmacy to adjust p.o. regimen.    2/4 I have seen and examined the patient at bedside    Patient reported right shoulder pain, 8 out of 10, worsening with movement.  I ordered the x-ray    BG has been improving.  Patient only requires minimal insulin.  Will start a trial of with p.o. diabetes meds.  Patient was declined by group home due to insulin injection.   I started glipizide 5 mg daily, may increase to 10 mg if BG stays high.  May add other p.o. diabetes meds if needed.     Patient wants to talk to psych  I have reconsulted psych    I have discussed this patient's plan of care and discharge plan at IDT rounds today with Case Management, Nursing, Nursing leadership, and other members of the IDT team.    Consultants/Specialty  None at this time     Code Status  Full Code    Disposition  The patient is medically cleared for discharge to home or a post-acute facility.      I have placed the appropriate orders for post-discharge needs.    Review of Systems  Review of Systems   Constitutional:  Positive for malaise/fatigue. Negative for chills, diaphoresis and fever.   Eyes:  Negative for blurred vision and double vision.   Respiratory:  Positive for cough and shortness of breath. Negative for sputum production and wheezing.    Cardiovascular:  Negative for palpitations.   Gastrointestinal:  Negative for abdominal pain and nausea.   Genitourinary:  Negative for dysuria.   Musculoskeletal:  Positive for back pain and myalgias.    Neurological:  Positive for weakness. Negative for dizziness and headaches.   Psychiatric/Behavioral:  Negative for depression. The patient is not nervous/anxious.         Physical Exam  Temp:  [36.3 °C (97.4 °F)-36.5 °C (97.7 °F)] 36.5 °C (97.7 °F)  Pulse:  [70-85] 85  Resp:  [16-18] 18  BP: (102-128)/(64-76) 128/76  SpO2:  [92 %-95 %] 92 %    Physical Exam  Vitals and nursing note reviewed.   Constitutional:       General: He is not in acute distress.     Appearance: Normal appearance. He is not ill-appearing, toxic-appearing or diaphoretic.   HENT:      Mouth/Throat:      Mouth: Mucous membranes are moist.      Pharynx: Oropharynx is clear.   Eyes:      General: No scleral icterus.     Extraocular Movements: Extraocular movements intact.      Conjunctiva/sclera: Conjunctivae normal.   Cardiovascular:      Rate and Rhythm: Regular rhythm.      Pulses: Normal pulses.   Pulmonary:      Effort: Pulmonary effort is normal. No respiratory distress.      Breath sounds: No wheezing, rhonchi or rales.   Abdominal:      General: There is no distension.      Tenderness: There is no abdominal tenderness. There is no guarding or rebound.   Musculoskeletal:         General: No swelling or tenderness.      Cervical back: Normal range of motion. No rigidity or tenderness.   Skin:     General: Skin is warm.      Coloration: Skin is not jaundiced.      Findings: No erythema.   Neurological:      Mental Status: He is alert and oriented to person, place, and time. Mental status is at baseline.      Cranial Nerves: No cranial nerve deficit.   Psychiatric:         Mood and Affect: Mood normal.         Behavior: Behavior normal.         Thought Content: Thought content normal.         Judgment: Judgment normal.         Fluids    Intake/Output Summary (Last 24 hours) at 2/4/2025 1914  Last data filed at 2/4/2025 0753  Gross per 24 hour   Intake 720 ml   Output 1300 ml   Net -580 ml        Laboratory                               Imaging  DX-SHOULDER 2+ RIGHT   Final Result      1.  No fracture or dislocation of RIGHT shoulder.   2.  Degenerative change of AC joint.      US-RUQ   Final Result      1.  Distended gallbladder with gallbladder sludge and a small calcified gallstone. No discrete sonographic evidence of acute cholecystitis.   2.  Mildly dilated main portal vein which could indicate portal hypertension      DX-CHEST-PORTABLE (1 VIEW)   Final Result      Left basilar atelectasis. Underlying infection is possible.      MR-LUMBAR SPINE-W/O   Final Result      1.  T12 chronic compression fracture with anterior wedge deformity.   2.  Postoperative changes with interval L4-5 laminectomy since prior MRI study. There is now laminectomy at L4-5-S1 and there is now posterior fusion with transpedicular screw fixation at L4-L5. Relief of previously seen severe central stenosis at L4-5.   3.  Multilevel degenerative changes as detailed for each level above in the body of report.      MR-THORACIC SPINE-W/O   Final Result      1.  T12 old compression fracture with anterior wedge deformity. Associated mild lower thoracic kyphosis.   2.  T2 old superior endplate compression fracture.   3.  Multilevel disc bulges T3-T4, T7-T8, T8-T9, T11-12, T12-L1. Variable mass effect on the ventral thecal sac without quinton central stenosis or cord impingement at any thoracic level.   4.  No myelopathic cord signal at any thoracic level.      DX-CHEST-PORTABLE (1 VIEW)   Final Result      No acute cardiopulmonary disease evident.           Assessment/Plan  * Compression fracture of body of thoracic vertebra (HCC)- (present on admission)  Assessment & Plan  MRI T-spine with T12 old compression fracture, T2 old superior endplate compression fracture, multiple level disc bulges T3-4 T7-T8 T8-T9, T11-T12, variable mass effect with out  central stenosis or cord impingement, MRI L-spine with T12 compression fracture, previous L4-L5 laminectomy, multilevel  degenerative changes.  - Neurosurgery has been consulted  - Recommending non operative management at this time  - Continue to follow-up outpatient for possible epidural  - Continue gabapentin, lidocaine and oxycodone as needed for pain management  - PT OT recommending postacute rehabilitation  - I personally reviewed MRI images with patient at bedside 1/16 1/21 placement pending, f/u with CM  - ongoing LBP, schedule flexeril    1/22 PT reeval with AFO now available  Pain control, encourage activity, placement pending    1/23 PT  with AFO, encourage activity, pending placement, CM to assist, ? GH  1/24 PT to work with patient with AFO, consult replaced  Encourage activity    1/25 patient worked with PT with AFO, encourage activity    1/26 cont activity, placement pending    1/27 difficult discharge,  assisting  Considering transfer to another state to be closer to family, patient can no longer live alone, cannot self administer insulin or take care of himself  Encourage activity  Placement pending    Sepsis (HCC)  Assessment & Plan  This is Sepsis Not present on admission  SIRS criteria identified on my evaluation include: Tachycardia, with heart rate greater than 90 BPM and Tachypnea, with respirations greater than 20 per minute  Clinical indicators of end organ dysfunction include Hypotension with systolic blood pressure less than 90 or MAP less than 65, Lactic Acid greater than 2, and Acute Respiratory Failure - (mechanical ventilation or BiPap or PaO2/FiO2 ratio < 300)  Source is pneumonia/lungs  Sepsis protocol initiated  Crystalloid Fluid Administration: Fluid resuscitation ordered per standard protocol - 30 mL/kg per current or ideal body weight  IV antibiotics as appropriate for source of sepsis  Reassessment: I have reassessed the patient's hemodynamic status    1/27 septic protocol initiated, received 4 L of fluid hydration, improved blood pressure, lactic acidosis resolved, will follow-up  blood a.m. lactic acid level, repeat is at 2  Borderline hypotension, continue fluid hydration  Patient now on IV antibiotics, follow-up cultures  Taper oxygen as tolerated, chest x-ray concerning for infiltrates    Moderate episode of recurrent major depressive disorder (HCC)  Assessment & Plan  Patient endorsing depression.  Unwillingness to participate in his rehabilitation.  Endorses feelings of helplessness and hopelessness.  Per nursing staff he has expressed SI without a plan.  He does not endorse SI to me; on 2 separate occasions.  - Continue to encourage patient to participate in his rehabilitation.  - Increase venlafaxine to 75 mg  - Inpatient behavioral health referral has been placed.  - I do not see an indication to initiate a legal hold at this point.  Patient denies SI and SI plan to me.  Per nursing staff patient does become agitated when his snacks are withheld.  We will continue to monitor the situation closely.    Bacteriuria- (present on admission)  Assessment & Plan  No abx's for now given lack of symptoms    Physical debility- (present on admission)  Assessment & Plan  MRI with compression T2 and T12 fracture, multilevel bulging disc through thoracic spine.  No cord impingement.  Neurosurgery recommended nonoperative management  Follow-up for epidural injection as outpatient  Placement is pending    1/21 Patient reports ambulatory status prior to this admission, to follow-up with orthopedic surgery for AFO evaluation  , Will reconsult PT OT for AFO as needed, will consult orthopedic surgery as needed  -Patient reports low back pain, has Zanaflex as needed, will add Flexeril 3 times daily  Continue pain control  Encourage activity    Acute hypoxic respiratory failure (HCC)- (present on admission)  Assessment & Plan  Resolved  Now on room air, continue to monitor oxygen requirement    Elevated troponin- (present on admission)  Assessment & Plan  Chronically elevated. Baseline  - no further  workup    Uncontrolled type 2 diabetes mellitus with hyperglycemia (HCC)- (present on admission)  Assessment & Plan  A1C 9.8  - Continue Lantus to 25 units in the evening  - Continue sliding scale insulin; has required 2 units SSI in last 24 hours  - Patient states he is unable to drop insulin at home due to hand coordination issues.  We will try to minimize sliding scale insulin and discharged with only Lantus pens.  - Monitor BG trend.   - Accu-Cheks before meals and at bedtime.   - Goal to keep BG between 140-180 per 2019 ADA guidelines     1/21 hypoglycemia this am, decrease Lantus to 18 units, cont ssi  - f/u am labs    1/22 bs improved, on lantus 18 units, cont and monitor  ? GH    1/23 improvng bs, increase Lantus 20 u, add metformin, monitor    1/24 improving, cont lantus, f/u am bmp, consider increasing metformin    1/25 hypoglycemia this a.m., decreased metformin and Lantus, monitor closely    1/26 improved bs, now on lantus 10u, monitor, encourage activity    1/27 improved control, continue Lantus    CKD (chronic kidney disease)- (present on admission)  Assessment & Plan  Creatinine stable at 0.57  Stable, secondary to diabetes       VTE prophylaxis: Lovenox      Total time spent 52 minutes. I spent greater than 50% of the time for patient care, counseling, and coordination on this date, including unit/floor time, and face-to-face time with the patient as per interval events, my own review of patient's imaging and lab analysis and developing my assessment and plan above.

## 2025-02-05 NOTE — PROGRESS NOTES
Received report from Day RN. Pt is awake, alert, and ANOx4 for bedside report. All needs met at this time. Bed is in low and locked position with call light in reach. No signs of pain or discomfort

## 2025-02-05 NOTE — CONSULTS
"[facility]    INPATIENT ASSESSMENT    Name: Christoph Taylor  MRN: [id number]  : 1965  Age: 59 y.o.  Date of assessment: 2025  PCP: DIANA Tariq  Persons in attendance: Patient    HPI: Per Medical Record: \"Christoph Taylor is a 59-year-old male with prior suicidal ideation, poorly controlled diabetes mellitus type 2, housing insecurity and severe protein calorie malnutrition.  Admitted  for acute on chronic back pain.     Patient states that he is unable to take care of himself and has a hard time administering insulin, buying food for himself, taking his blood sugars. Additionally says that he has worsening neuropathy of the feet and hands. His pain is worse than ever. He has no additional help here.     In the ER he was found to be hyperglycemic and quite weak.  UA was concerning for possible UTI.  Patient was given 1 dose of IV cefazolin.  However, he denied urinary symptoms and antibiotics were not continued.      In terms of patient's weakness-MRI thoracic and lumbar notable for old compression fracture at T12 and multiple disc bulging without quinton central canal stenosis or cord impingement.  Neurosurgery was consulted.  Dr. Johnson recommending non operative management and outpatient follow-up for possible epidural injection.  PT OT recommending postacute rehabilitation.  However, no accepting facilities were identified.  Patient intermittently refusing to work with PT here.\" Patient was referred to Psychiatry for emotional support due to long length of stay and discharge planning challenges.         CHIEF COMPLAINT/PRESENTING ISSUE (as stated by Patient): Christoph Taylor is a 59 year old male known to this service due to previous interventions during multiple hospitalizations. Patient requested Behavioral Health consult. Patient was seen sitting up in bed, alert, oriented, speech clear and coherent, cooperative and willing to engage.   Patient states he is concerned about " his discharge plan and wonders what are his options. Patient was referred to Case Management for further discussions related to discharge planning.   Clinician provided support and encouragement while validating patient for becoming more participatory in his care plan. Patient reports his concern about his service dog and wondered if his placement could include him bringing a service comfort dog.   Clinician referred this question to Case Management, and a message was sent to his  for follow up on the next business day. Patient was appreciative and thanked clinician for the visit. Patient reports some anxiety related to not knowing where he would transition from this hospital and would like more discussion about his options. Clinician reminded patient of tools to manage anxiety. Patient states he would appreciate being closer to his family in the [place]. This is the first time patient has discussed wanting to be near family. Previously, patient has not been willing to discuss family and felt he was in the world alone. This is great progress. Patient reports less depressive symptoms, although he remains depressed, at least he is talking more, which is positive.    Chief Complaint   Patient presents with    Weakness     Increasing weakness last week, decreased dexterity with upper extremities baseline, thoracic back pain and worsening last x4 days, prior back surgeries, Rx Gabapentin    High Blood Sugar     With , has not been able to give self insulin last x4 days, at ER POC glucose 382        MENTAL STATUS              Participation: adequate  Grooming: Casual  Orientation: Alert  Behavior: cooperative  Eye contact: Limited  Mood: Depressed  Affect: Flat  Thought process: Circumstantial  Thought content: Within normal limits  Speech: Soft  Perception: Within normal limits  Memory:  Recent:  Adequate  Insight: Adequate  Judgment:  Adequate  Other:    Collateral information:   Source:    Significant other present in person:    Significant other by telephone   [facility]    [facility] Staff   [facility] Record-reviewed   Other:      Unable to complete full assessment due to:   Acute intoxication   Patient declined to participate/engage   Patient verbally unresponsive   Significant cognitive deficits   Significant perceptual distortions or behavioral disorganization   Other:             CLINICAL IMPRESSIONS:  Primary:  Major Depressive Disorder, recurrent  Secondary:  Depression due to other medical condition  Secondary:                                         IDENTIFIED NEEDS/PLAN:  [Trigger DISPOSITION list for any items marked]      Imminent safety risk - self  Imminent safety risk - others     Acute substance withdrawal   Psychosis/Impaired reality testing    x Mood/anxiety   Substance use/Addictive behavior     Maladaptive behavior   Parent/child conflict     Family/Couples conflict   Biomedical     Housing   Financial      Legal  Occupational/Educational     Domestic violence   Other:     Recommendations and Observation Level:    Case management- Patient reports limited knowledge of discharge plan, patient would benefit from an update of the status of his plan to increase his engagement and participation in his overall life and future well being. Patient wants his dog to come to placement if possible.    Legal Hold: N/A          Farzana Tripathi, Ph.D., [facility]  2/4/2025   Length of intervention: 30 minutes

## 2025-02-06 LAB
GLUCOSE BLD STRIP.AUTO-MCNC: 158 MG/DL (ref 65–99)
GLUCOSE BLD STRIP.AUTO-MCNC: 172 MG/DL (ref 65–99)
GLUCOSE BLD STRIP.AUTO-MCNC: 182 MG/DL (ref 65–99)
GLUCOSE BLD STRIP.AUTO-MCNC: 247 MG/DL (ref 65–99)

## 2025-02-06 PROCEDURE — 700102 HCHG RX REV CODE 250 W/ 637 OVERRIDE(OP): Performed by: INTERNAL MEDICINE

## 2025-02-06 PROCEDURE — 700102 HCHG RX REV CODE 250 W/ 637 OVERRIDE(OP)

## 2025-02-06 PROCEDURE — 770006 HCHG ROOM/CARE - MED/SURG/GYN SEMI*

## 2025-02-06 PROCEDURE — 700102 HCHG RX REV CODE 250 W/ 637 OVERRIDE(OP): Performed by: STUDENT IN AN ORGANIZED HEALTH CARE EDUCATION/TRAINING PROGRAM

## 2025-02-06 PROCEDURE — A9270 NON-COVERED ITEM OR SERVICE: HCPCS | Performed by: STUDENT IN AN ORGANIZED HEALTH CARE EDUCATION/TRAINING PROGRAM

## 2025-02-06 PROCEDURE — 700102 HCHG RX REV CODE 250 W/ 637 OVERRIDE(OP): Performed by: NURSE PRACTITIONER

## 2025-02-06 PROCEDURE — A9270 NON-COVERED ITEM OR SERVICE: HCPCS

## 2025-02-06 PROCEDURE — 82962 GLUCOSE BLOOD TEST: CPT

## 2025-02-06 PROCEDURE — 700111 HCHG RX REV CODE 636 W/ 250 OVERRIDE (IP): Mod: JZ | Performed by: INTERNAL MEDICINE

## 2025-02-06 PROCEDURE — A9270 NON-COVERED ITEM OR SERVICE: HCPCS | Performed by: INTERNAL MEDICINE

## 2025-02-06 PROCEDURE — 700101 HCHG RX REV CODE 250: Performed by: NURSE PRACTITIONER

## 2025-02-06 PROCEDURE — A9270 NON-COVERED ITEM OR SERVICE: HCPCS | Performed by: NURSE PRACTITIONER

## 2025-02-06 PROCEDURE — 99232 SBSQ HOSP IP/OBS MODERATE 35: CPT | Performed by: STUDENT IN AN ORGANIZED HEALTH CARE EDUCATION/TRAINING PROGRAM

## 2025-02-06 RX ORDER — GLIPIZIDE 5 MG/1
5 TABLET ORAL ONCE
Status: COMPLETED | OUTPATIENT
Start: 2025-02-06 | End: 2025-02-06

## 2025-02-06 RX ORDER — GLIPIZIDE 10 MG/1
10 TABLET ORAL
Status: DISCONTINUED | OUTPATIENT
Start: 2025-02-07 | End: 2025-02-07 | Stop reason: HOSPADM

## 2025-02-06 RX ADMIN — VENLAFAXINE HYDROCHLORIDE 75 MG: 75 TABLET ORAL at 05:29

## 2025-02-06 RX ADMIN — GABAPENTIN 200 MG: 100 CAPSULE ORAL at 05:29

## 2025-02-06 RX ADMIN — INSULIN LISPRO 1 UNITS: 100 INJECTION, SOLUTION INTRAVENOUS; SUBCUTANEOUS at 12:16

## 2025-02-06 RX ADMIN — GABAPENTIN 200 MG: 100 CAPSULE ORAL at 18:11

## 2025-02-06 RX ADMIN — TIZANIDINE 2 MG: 4 TABLET ORAL at 14:15

## 2025-02-06 RX ADMIN — LIDOCAINE 3 PATCH: 4 PATCH TOPICAL at 22:25

## 2025-02-06 RX ADMIN — ENOXAPARIN SODIUM 40 MG: 100 INJECTION SUBCUTANEOUS at 17:25

## 2025-02-06 RX ADMIN — OMEPRAZOLE 40 MG: 20 CAPSULE, DELAYED RELEASE ORAL at 17:25

## 2025-02-06 RX ADMIN — GLIPIZIDE 5 MG: 5 TABLET ORAL at 08:37

## 2025-02-06 RX ADMIN — GLIPIZIDE 5 MG: 5 TABLET ORAL at 08:14

## 2025-02-06 RX ADMIN — OXYCODONE 5 MG: 5 TABLET ORAL at 22:11

## 2025-02-06 RX ADMIN — INSULIN LISPRO 2 UNITS: 100 INJECTION, SOLUTION INTRAVENOUS; SUBCUTANEOUS at 08:14

## 2025-02-06 RX ADMIN — GABAPENTIN 200 MG: 100 CAPSULE ORAL at 12:36

## 2025-02-06 RX ADMIN — INSULIN LISPRO 1 UNITS: 100 INJECTION, SOLUTION INTRAVENOUS; SUBCUTANEOUS at 22:23

## 2025-02-06 RX ADMIN — SITAGLIPTIN 100 MG: 100 TABLET, FILM COATED ORAL at 17:26

## 2025-02-06 RX ADMIN — INSULIN LISPRO 1 UNITS: 100 INJECTION, SOLUTION INTRAVENOUS; SUBCUTANEOUS at 17:25

## 2025-02-06 RX ADMIN — OMEPRAZOLE 40 MG: 20 CAPSULE, DELAYED RELEASE ORAL at 05:29

## 2025-02-06 RX ADMIN — OXYCODONE 5 MG: 5 TABLET ORAL at 08:14

## 2025-02-06 RX ADMIN — OXYCODONE 5 MG: 5 TABLET ORAL at 14:15

## 2025-02-06 RX ADMIN — TIZANIDINE 2 MG: 4 TABLET ORAL at 08:14

## 2025-02-06 ASSESSMENT — PAIN DESCRIPTION - PAIN TYPE
TYPE: CHRONIC PAIN
TYPE: ACUTE PAIN
TYPE: CHRONIC PAIN
TYPE: ACUTE PAIN

## 2025-02-06 ASSESSMENT — ENCOUNTER SYMPTOMS
DEPRESSION: 0
HEADACHES: 0
SHORTNESS OF BREATH: 1
FEVER: 0
BACK PAIN: 1
MYALGIAS: 1
WEAKNESS: 1
SPUTUM PRODUCTION: 0
CHILLS: 0
NERVOUS/ANXIOUS: 0
WHEEZING: 0
ABDOMINAL PAIN: 0
NAUSEA: 0
PALPITATIONS: 0
DOUBLE VISION: 0
DIAPHORESIS: 0
DIZZINESS: 0
COUGH: 1
BLURRED VISION: 0

## 2025-02-06 NOTE — CARE PLAN
The patient is Watcher - Medium risk of patient condition declining or worsening    Shift Goals  Clinical Goals: Patient will report a pain level less than or equal to 5 by the end of shift  Patient Goals: rest, comfort  Family Goals: KATHERINE    Progress made toward(s) clinical / shift goals:  Patients pain controlled with medication as per MAR, rest, and food. Patient did report a pain level less than or equal to 5.     Patient is not progressing towards the following goals: progressing

## 2025-02-06 NOTE — PROGRESS NOTES
4 Eyes Skin Assessment Completed by PENNIE Copeland and PENNIE Farrell.    Head WDL  Ears WDL  Nose WDL  Mouth WDL  Neck WDL  Breast/Chest WDL  Shoulder Blades WDL  Spine WDL  (R) Arm/Elbow/Hand Dry flaky contracted hands  (L) Arm/Elbow/Hand Dry flaky contracted hands  Abdomen WDL  Groin WDL  Scrotum/Coccyx/Buttocks Redness and Blanching left hip  (R) Leg Scab  (L) Leg Scab  (R) Heel/Foot/Toe WDL  (L) Heel/Foot/Toe WDL          Devices In Places Blood Pressure Cuff      Interventions In Place Pillows and Barrier Cream    Possible Skin Injury Yes wound care already in place    Pictures Uploaded Into Epic Yes  Wound Consult Placed N/A already placed  RN Wound Prevention Protocol Ordered Yes already in place

## 2025-02-06 NOTE — DISCHARGE PLANNING
ATTN: Case Management  RE: Referral for Home Health    Reason for referral denial: Please send to [facility] as they are the preferred provider for the pt's insurance.              Unfortunately, we are not able to accept this referral for the reason listed above. If further clarity is needed, our Transitional Care Specialists are available to discuss any barriers to service at [phone].      We look forward to collaborating with you in the future,  [facility] Team

## 2025-02-06 NOTE — CARE PLAN
The patient is Stable - Low risk of patient condition declining or worsening    Shift Goals  Clinical Goals: Pts pain will remain < 5 below shift  Patient Goals: Rest and comfort  Family Goals: KATHERINE    Progress made toward(s) clinical / shift goals:  Pts pain remained below 5 throughout the shift with rest and comfort measures      Problem: Pain Management  Goal: Pain level will decrease to patient's comfort goal  Outcome: Progressing     Problem: Discharge Barriers/Planning  Goal: Patient's continuum of care needs are met  Outcome: Progressing     Problem: Mobility  Goal: Patient's capacity to carry out activities will improve  Outcome: Progressing     Problem: Self Care  Goal: Patient will have the ability to perform ADLs independently or with assistance (bathe, groom, dress, toilet and feed)  Outcome: Progressing       Patient is not progressing towards the following goals:

## 2025-02-06 NOTE — PROGRESS NOTES
Hospital Medicine Daily Progress Note    Date of Service      Chief Complaint  Christoph Taylor is a 59 y.o. male admitted 1/9/2025 with weakness.    Hospital Course  Christoph Taylor is a 59-year-old male with prior suicidal ideation, poorly controlled diabetes mellitus type 2, housing insecurity and severe protein calorie malnutrition.  Admitted 1/9 for acute on chronic back pain.    Patient states that he is unable to take care of himself and has a hard time administering insulin, buying food for himself, taking his blood sugars. Additionally says that he has worsening neuropathy of the feet and hands. His pain is worse than ever. He has no additional help here.    In the ER he was found to be hyperglycemic and quite weak.  UA was concerning for possible UTI.  Patient was given 1 dose of IV cefazolin.  However, he denied urinary symptoms and antibiotics were not continued.     In terms of patient's weakness-MRI thoracic and lumbar notable for old compression fracture at T12 and multiple disc bulging without quinton central canal stenosis or cord impingement.  Neurosurgery was consulted.  Dr. Johnson recommending non operative management and outpatient follow-up for possible epidural injection.  PT OT recommending postacute rehabilitation.  However, no accepting facilities were identified.  Patient intermittently refusing to work with PT here.    Home health has been arranged prior to discharge.    Interval Problem Update  1/14: Vitals stable overnight.  -129.  Patient is medically clear for discharge pending placement.    1/15: Vitals remained stable.   through 156.  Glucose ranging 104 through 246.  Case management is working hard to obtain patient's insurance card to evaluate benefits for postacute rehabilitation.  Patient is medically cleared to discharge once insurance barrier has been resolved.    1/16: Vitals stable.  Glucose this a.m. 176.  Range 2 29 through 3 05.  Increase Lantus to 25 units  in the p.m.  Multiple long discussions with patient, case management and nursing staff regarding discharge plan.  Patient feels unsafe to discharge to his home.  He has 5 steps.  He does not have a ramp.  He is largely wheelchair-bound due to chronic pain.    1/17: Vital stable overnight.  -130.  Glucose improved with increase Lantus dosing.  A.m. blood sugar 104.  Patient is now being followed by complex discharge committee.    1/18: Vitals notable for SBP .  Glucose ranging 125 through 196.  Patient required 1 unit sliding scale insulin yesterday.  Patient remains medically clear for discharge though a safe discharge plan has not been arranged yet.  Encouraged ambulation at least 3 times daily.    1/19: Vital stable overnight.   through 126.  Glucose range .  Required 2 units SSI over last 24 hours.    1/20: Vitals stable overnight.  Patient endorsing depression and refusal to participate in medical therapies.  I have placed a referral for inpatient psychology/behavioral health.  Increase venlafaxine to 75 mg.  Continue to encourage participation in ambulation and patient rehabilitation.    1/21 Patient able to self transfer to chair, reports ongoing low back pain, pain level of 7/10  States that he is now wheelchair-bound, pending placement,  to assist  Patient with hypoglycemic episode this a.m. was given juice, unable to self administer insulin at home, will need placement assistance    1/22 no new events, sleeping arousable, AFO delivered for PT reeval    1/23 reports low back pain, able to transfer to chair, PT eval    1/24 ongoing lbp, min activity, Has not worked with PT with AFO, to reconsult  Improving blood sugar    1/25 patient with hypoglycemia this morning, blood sugar of 69, arousable improved after dextrose administration, Lantus decreased patient did work with PT with AFO, encourage activity    1/26 improved blood sugar, patient sitting up in chair, no new  complaints  Patient did have increased activity with AFO, continue to encourage activity    1/27 patient was noted to be hypotensive with increasing respiratory failure requiring 4-5 L oxygen supplementation, chest x-ray with infiltrates  Patient was started on septic protocol, given 4 L of IV fluid hydration as well as initiated on IV antibiotics, UA is negative  Patient does not recall events of last evening, patient feels mild low back pain, patient does report dry cough, still on 4 to 5 L, taper and oxygen needs  Encourage activity    1/28 TAKING OVER CARE  Afebrile, normal HR/RR, 's-140's, SpO2 95-97% on 2 L NC.  No leukocytosis, stable anemia, mild thrombocytopenia.  LFT's improving, blood cultures remain negative.  Repeat procalcitonin in the morning, check CRP, CBC.  Suspect will be de-escalating antibiotics tomorrow.  Feeling general fatigue.  Tolerating p.o. intake.    1/29  Looking a bit better today.  Afebrile normal pulse and respiratory rate normal blood pressure, weaned off of supplemental oxygen today.  Procalcitonin checked from 30 down to 16, continue antibiotics for now.  Check magnesium, 1.3, IV replacement ordered.  Having some aches and pains, analgesia adjusted.  Tolerating p.o. intake.    1/30  Afebrile with normal vitals, weaned down to room air.  Sitting up edge of bed eating his meal, no acute complaints, tolerating p.o. intake.  Will work with pharmacy to see about ordering him a continuous glucose monitor as well as insulin pump to help alleviate any insulin administration concerns holding him up from getting into a group home.  Patient was interested in this idea as well.    1/31  Will order insulin pump and CGM and see if we can get it through approved services which could help him get discharged to a group home.   No new complaints, tolerating diet.     2/2  Tolerating diet, no acute complaints, normal vitals.  Will need to see if we can get approved services for CGM and pump  (working with pharmacist to get it ordered) to help facilitate a discharge to group home.    2/3  Patient doing well, no acute changes or complaints.  Discussed with diabetes educator if insulin pump and CGM would work, apparently the plan will not work.  Working with pharmacy to see if we can of the p.o. regimen to help control his diabetes so that he does not have to rely on insulin as it appears that has been a barrier to get him to outpatient living arrangements.  Work with pharmacy to adjust p.o. regimen.    2/4 I have seen and examined the patient at bedside    Patient reported right shoulder pain, 8 out of 10, worsening with movement.  I ordered the x-ray    BG has been improving.  Patient only requires minimal insulin.  Will start a trial of with p.o. diabetes meds.  Patient was declined by group home due to insulin injection.   I started glipizide 5 mg daily, may increase to 10 mg if BG stays high.  May add other p.o. diabetes meds if needed.     Patient wants to talk to psych  I have reconsulted psych    2/5 BG has been under 200, continue glipizide.  May adjust the dose if needed  Continue SSI  Hypoglycemia protocol    Right shoulder x-ray no acute fracture/infection; consistent with degenerative changes  Pain control    I have discussed this patient's plan of care and discharge plan at IDT rounds today with Case Management, Nursing, Nursing leadership, and other members of the IDT team.    Consultants/Specialty  None at this time     Code Status  Full Code    Disposition  The patient is medically cleared for discharge to home or a post-acute facility.      I have placed the appropriate orders for post-discharge needs.    Review of Systems  Review of Systems   Constitutional:  Positive for malaise/fatigue. Negative for chills, diaphoresis and fever.   Eyes:  Negative for blurred vision and double vision.   Respiratory:  Positive for cough and shortness of breath. Negative for sputum production and  wheezing.    Cardiovascular:  Negative for palpitations.   Gastrointestinal:  Negative for abdominal pain and nausea.   Genitourinary:  Negative for dysuria.   Musculoskeletal:  Positive for back pain and myalgias.   Neurological:  Positive for weakness. Negative for dizziness and headaches.   Psychiatric/Behavioral:  Negative for depression. The patient is not nervous/anxious.         Physical Exam  Temp:  [36.1 °C (96.9 °F)-36.8 °C (98.2 °F)] 36.1 °C (97 °F)  Pulse:  [75-82] 75  Resp:  [16-18] 18  BP: (103-137)/(72-83) 137/82  SpO2:  [90 %-96 %] 95 %    Physical Exam  Vitals and nursing note reviewed.   Constitutional:       General: He is not in acute distress.     Appearance: Normal appearance. He is not ill-appearing, toxic-appearing or diaphoretic.   HENT:      Mouth/Throat:      Mouth: Mucous membranes are moist.      Pharynx: Oropharynx is clear.   Eyes:      General: No scleral icterus.     Extraocular Movements: Extraocular movements intact.      Conjunctiva/sclera: Conjunctivae normal.   Cardiovascular:      Rate and Rhythm: Regular rhythm.      Pulses: Normal pulses.   Pulmonary:      Effort: Pulmonary effort is normal. No respiratory distress.      Breath sounds: No wheezing, rhonchi or rales.   Abdominal:      General: There is no distension.      Tenderness: There is no abdominal tenderness. There is no guarding or rebound.   Musculoskeletal:         General: No swelling or tenderness.      Cervical back: Normal range of motion. No rigidity or tenderness.   Skin:     General: Skin is warm.      Coloration: Skin is not jaundiced.      Findings: No erythema.   Neurological:      Mental Status: He is alert and oriented to person, place, and time. Mental status is at baseline.      Cranial Nerves: No cranial nerve deficit.   Psychiatric:         Mood and Affect: Mood normal.         Behavior: Behavior normal.         Thought Content: Thought content normal.         Judgment: Judgment normal.          Fluids    Intake/Output Summary (Last 24 hours) at 2/5/2025 1842  Last data filed at 2/5/2025 1634  Gross per 24 hour   Intake 1560 ml   Output 1000 ml   Net 560 ml        Laboratory  Recent Labs     02/05/25  1353   WBC 4.5*   RBC 4.17*   HEMOGLOBIN 10.6*   HEMATOCRIT 33.7*   MCV 80.8*   MCH 25.4*   MCHC 31.5*   RDW 40.2   PLATELETCT 255   MPV 8.8*       Recent Labs     02/05/25  1353   SODIUM 139   POTASSIUM 4.3   CHLORIDE 100   CO2 32   GLUCOSE 153*   BUN 28*   CREATININE 0.75   CALCIUM 8.8                       Imaging  DX-SHOULDER 2+ RIGHT   Final Result      1.  No fracture or dislocation of RIGHT shoulder.   2.  Degenerative change of AC joint.      US-RUQ   Final Result      1.  Distended gallbladder with gallbladder sludge and a small calcified gallstone. No discrete sonographic evidence of acute cholecystitis.   2.  Mildly dilated main portal vein which could indicate portal hypertension      DX-CHEST-PORTABLE (1 VIEW)   Final Result      Left basilar atelectasis. Underlying infection is possible.      MR-LUMBAR SPINE-W/O   Final Result      1.  T12 chronic compression fracture with anterior wedge deformity.   2.  Postoperative changes with interval L4-5 laminectomy since prior MRI study. There is now laminectomy at L4-5-S1 and there is now posterior fusion with transpedicular screw fixation at L4-L5. Relief of previously seen severe central stenosis at L4-5.   3.  Multilevel degenerative changes as detailed for each level above in the body of report.      MR-THORACIC SPINE-W/O   Final Result      1.  T12 old compression fracture with anterior wedge deformity. Associated mild lower thoracic kyphosis.   2.  T2 old superior endplate compression fracture.   3.  Multilevel disc bulges T3-T4, T7-T8, T8-T9, T11-12, T12-L1. Variable mass effect on the ventral thecal sac without quinton central stenosis or cord impingement at any thoracic level.   4.  No myelopathic cord signal at any thoracic level.       DX-CHEST-PORTABLE (1 VIEW)   Final Result      No acute cardiopulmonary disease evident.           Assessment/Plan  * Compression fracture of body of thoracic vertebra (HCC)- (present on admission)  Assessment & Plan  MRI T-spine with T12 old compression fracture, T2 old superior endplate compression fracture, multiple level disc bulges T3-4 T7-T8 T8-T9, T11-T12, variable mass effect with out  central stenosis or cord impingement, MRI L-spine with T12 compression fracture, previous L4-L5 laminectomy, multilevel degenerative changes.  - Neurosurgery has been consulted  - Recommending non operative management at this time  - Continue to follow-up outpatient for possible epidural  - Continue gabapentin, lidocaine and oxycodone as needed for pain management  - PT OT recommending postacute rehabilitation  - I personally reviewed MRI images with patient at bedside 1/16 1/21 placement pending, f/u with CM  - ongoing LBP, schedule flexeril    1/22 PT reeval with AFO now available  Pain control, encourage activity, placement pending    1/23 PT  with AFO, encourage activity, pending placement, CM to assist, ? GH  1/24 PT to work with patient with AFO, consult replaced  Encourage activity    1/25 patient worked with PT with AFO, encourage activity    1/26 cont activity, placement pending    1/27 difficult discharge,  assisting  Considering transfer to another state to be closer to family, patient can no longer live alone, cannot self administer insulin or take care of himself  Encourage activity  Placement pending    Sepsis (HCC)  Assessment & Plan  This is Sepsis Not present on admission  SIRS criteria identified on my evaluation include: Tachycardia, with heart rate greater than 90 BPM and Tachypnea, with respirations greater than 20 per minute  Clinical indicators of end organ dysfunction include Hypotension with systolic blood pressure less than 90 or MAP less than 65, Lactic Acid greater than 2, and Acute  Respiratory Failure - (mechanical ventilation or BiPap or PaO2/FiO2 ratio < 300)  Source is pneumonia/lungs  Sepsis protocol initiated  Crystalloid Fluid Administration: Fluid resuscitation ordered per standard protocol - 30 mL/kg per current or ideal body weight  IV antibiotics as appropriate for source of sepsis  Reassessment: I have reassessed the patient's hemodynamic status    1/27 septic protocol initiated, received 4 L of fluid hydration, improved blood pressure, lactic acidosis resolved, will follow-up blood a.m. lactic acid level, repeat is at 2  Borderline hypotension, continue fluid hydration  Patient now on IV antibiotics, follow-up cultures  Taper oxygen as tolerated, chest x-ray concerning for infiltrates    Moderate episode of recurrent major depressive disorder (HCC)  Assessment & Plan  Patient endorsing depression.  Unwillingness to participate in his rehabilitation.  Endorses feelings of helplessness and hopelessness.  Per nursing staff he has expressed SI without a plan.  He does not endorse SI to me; on 2 separate occasions.  - Continue to encourage patient to participate in his rehabilitation.  - Increase venlafaxine to 75 mg  - Inpatient behavioral health referral has been placed.  - I do not see an indication to initiate a legal hold at this point.  Patient denies SI and SI plan to me.  Per nursing staff patient does become agitated when his snacks are withheld.  We will continue to monitor the situation closely.    Bacteriuria- (present on admission)  Assessment & Plan  No abx's for now given lack of symptoms    Physical debility- (present on admission)  Assessment & Plan  MRI with compression T2 and T12 fracture, multilevel bulging disc through thoracic spine.  No cord impingement.  Neurosurgery recommended nonoperative management  Follow-up for epidural injection as outpatient  Placement is pending    1/21 Patient reports ambulatory status prior to this admission, to follow-up with  orthopedic surgery for AFO evaluation  , Will reconsult PT OT for AFO as needed, will consult orthopedic surgery as needed  -Patient reports low back pain, has Zanaflex as needed, will add Flexeril 3 times daily  Continue pain control  Encourage activity    Acute hypoxic respiratory failure (HCC)- (present on admission)  Assessment & Plan  Resolved  Now on room air, continue to monitor oxygen requirement    Elevated troponin- (present on admission)  Assessment & Plan  Chronically elevated. Baseline  - no further workup    Uncontrolled type 2 diabetes mellitus with hyperglycemia (HCC)- (present on admission)  Assessment & Plan  A1C 9.8  - Continue Lantus to 25 units in the evening  - Continue sliding scale insulin; has required 2 units SSI in last 24 hours  - Patient states he is unable to drop insulin at home due to hand coordination issues.  We will try to minimize sliding scale insulin and discharged with only Lantus pens.  - Monitor BG trend.   - Accu-Cheks before meals and at bedtime.   - Goal to keep BG between 140-180 per 2019 ADA guidelines     1/21 hypoglycemia this am, decrease Lantus to 18 units, cont ssi  - f/u am labs    1/22 bs improved, on lantus 18 units, cont and monitor  ? GH    1/23 improvng bs, increase Lantus 20 u, add metformin, monitor    1/24 improving, cont lantus, f/u am bmp, consider increasing metformin    1/25 hypoglycemia this a.m., decreased metformin and Lantus, monitor closely    1/26 improved bs, now on lantus 10u, monitor, encourage activity    1/27 improved control, continue Lantus    CKD (chronic kidney disease)- (present on admission)  Assessment & Plan  Creatinine stable at 0.57  Stable, secondary to diabetes       VTE prophylaxis: Lovenox      Total time spent 36 minutes. I spent greater than 50% of the time for patient care, counseling, and coordination on this date, including unit/floor time, and face-to-face time with the patient as per interval events, my own review of  patient's imaging and lab analysis and developing my assessment and plan above.        VTE Selection

## 2025-02-06 NOTE — DISCHARGE PLANNING
Case Management Discharge Planning    Admission Date: 1/9/2025  GMLOS: 5.2  ALOS: 27    6-Clicks ADL Score: 18  6-Clicks Mobility Score: 20      Anticipated Discharge Dispo: Discharge Disposition: Discharged to home/self care (01)    This RN CM completed chart review and followed up with provider. Oral medication for diabetes is still being adjusted with BG in the 200s.  anticipates that patient will be medically cleared in the next 2 days.     This RN CM called sister Delia [phone] and updated on anticipated medical clearance in the next 2 days.  is unable to come up and care for patient as she is already taking care of her spouse with ALS and has another grand baby due any day now. Delia will reach out to Nickolas and brother of patient to see what additional support they can offer.  was provided caregiver resources for patient to work with until his mobile home can be sold and he can move into an [facility] in [place].     This RN CM spoke with patient at bedside and updated that he is medically cleared for discharge and he will be discharged with oral medication for his diabetes instead of insulin. This RN CM provided caregiver resource for [facility], application to [place] medicaid for caregiver waiver submitted.    Patient application for meals on wheels was faxed over with a copy provided to patient. [facility] has been accepted to [facility] , PCP follow up scheduled for 2/11 at 1545.     Neighbor Lenora has unlocked the door for mobile home and left spare key in house for patient to get into his home.     Transport request sent to [facility] for patient at 1830 as he would like to stay for dinner before leaving today.

## 2025-02-06 NOTE — DISCHARGE PLANNING
Received Choice form at 0657  Agency/Facility Name: [facility]  Referral sent per Choice form @ 5285     [facility] declined the home health referral.    @1600  DPA faxed a referral to [facility] declined the home health referral.    @2162  DPA faxed a referral to [facility]

## 2025-02-06 NOTE — PROGRESS NOTES
"Bedside report received.  Assessment complete.  A&O x 4. Patient calls appropriately.  Patient ambulates with front wheel walker and one assist. Bed alarm on.   Patient has 5-7/10 pain. Patient medicated per MAR.  Denies N&V. Tolerating CHO diet.  Surgical dressings CDI.  + void, + flatus, + BM.  Patient denies SOB.  SCD's refused.  Patient is pleasant and cooperative with the care plan.  Review plan with of care with patient. Call light and personal belongings within reach. Hourly rounding in place. All needs met at this time.  /87   Pulse 76   Temp 36.2 °C (97.2 °F) (Temporal)   Resp 18   Ht 1.829 m (6' 0.01\")   Wt 74.5 kg (164 lb 3.9 oz)   SpO2 90%   BMI 22.27 kg/m²     "

## 2025-02-06 NOTE — CARE PLAN
Problem: Safety  Goal: Will remain free from injury  Outcome: Progressing     Problem: Pain Management  Goal: Pain level will decrease to patient's comfort goal  Outcome: Progressing     Problem: Bowel Elimination  Goal: Establish and maintain regular bowel function  Outcome: Progressing     Problem: Skin Integrity  Goal: Risk for impaired skin integrity will decrease  Outcome: Progressing   The patient is Stable - Low risk of patient condition declining or worsening    Shift Goals  Clinical Goals: administer pain medication PRN, patient safety  Patient Goals: rest, pain control  Family Goals: KATHERINE    Progress made toward(s) clinical / shift goals:  pain medication administered PRN. Patient remained safe from injury.    Patient is not progressing towards the following goals:

## 2025-02-06 NOTE — PROGRESS NOTES
Received report from Day RN. Pt awake, alert, and ANOx4 for bedside report. All needs met at this time. Bed is in low and locked position with call light in reach. Pt shows no signs of stress or discomfort at this time

## 2025-02-07 ENCOUNTER — PHARMACY VISIT (OUTPATIENT)
Dept: PHARMACY | Facility: MEDICAL CENTER | Age: 60
End: 2025-02-07
Payer: COMMERCIAL

## 2025-02-07 VITALS
BODY MASS INDEX: 22.25 KG/M2 | TEMPERATURE: 97 F | HEIGHT: 72 IN | HEART RATE: 73 BPM | WEIGHT: 164.24 LBS | OXYGEN SATURATION: 93 % | RESPIRATION RATE: 18 BRPM | SYSTOLIC BLOOD PRESSURE: 103 MMHG | DIASTOLIC BLOOD PRESSURE: 58 MMHG

## 2025-02-07 LAB
GLUCOSE BLD STRIP.AUTO-MCNC: 149 MG/DL (ref 65–99)
GLUCOSE BLD STRIP.AUTO-MCNC: 169 MG/DL (ref 65–99)
GLUCOSE BLD STRIP.AUTO-MCNC: 198 MG/DL (ref 65–99)

## 2025-02-07 PROCEDURE — A9270 NON-COVERED ITEM OR SERVICE: HCPCS

## 2025-02-07 PROCEDURE — 82962 GLUCOSE BLOOD TEST: CPT

## 2025-02-07 PROCEDURE — A9270 NON-COVERED ITEM OR SERVICE: HCPCS | Performed by: STUDENT IN AN ORGANIZED HEALTH CARE EDUCATION/TRAINING PROGRAM

## 2025-02-07 PROCEDURE — RXMED WILLOW AMBULATORY MEDICATION CHARGE: Performed by: STUDENT IN AN ORGANIZED HEALTH CARE EDUCATION/TRAINING PROGRAM

## 2025-02-07 PROCEDURE — 700101 HCHG RX REV CODE 250: Performed by: NURSE PRACTITIONER

## 2025-02-07 PROCEDURE — 90832 PSYTX W PT 30 MINUTES: CPT | Performed by: SOCIAL WORKER

## 2025-02-07 PROCEDURE — 700102 HCHG RX REV CODE 250 W/ 637 OVERRIDE(OP): Performed by: INTERNAL MEDICINE

## 2025-02-07 PROCEDURE — 700102 HCHG RX REV CODE 250 W/ 637 OVERRIDE(OP): Performed by: NURSE PRACTITIONER

## 2025-02-07 PROCEDURE — 700102 HCHG RX REV CODE 250 W/ 637 OVERRIDE(OP)

## 2025-02-07 PROCEDURE — 700102 HCHG RX REV CODE 250 W/ 637 OVERRIDE(OP): Performed by: STUDENT IN AN ORGANIZED HEALTH CARE EDUCATION/TRAINING PROGRAM

## 2025-02-07 PROCEDURE — A9270 NON-COVERED ITEM OR SERVICE: HCPCS | Performed by: INTERNAL MEDICINE

## 2025-02-07 PROCEDURE — 700111 HCHG RX REV CODE 636 W/ 250 OVERRIDE (IP): Mod: JZ | Performed by: INTERNAL MEDICINE

## 2025-02-07 PROCEDURE — 99239 HOSP IP/OBS DSCHRG MGMT >30: CPT | Performed by: STUDENT IN AN ORGANIZED HEALTH CARE EDUCATION/TRAINING PROGRAM

## 2025-02-07 PROCEDURE — A9270 NON-COVERED ITEM OR SERVICE: HCPCS | Performed by: NURSE PRACTITIONER

## 2025-02-07 RX ORDER — OXYCODONE HYDROCHLORIDE 5 MG/1
5 TABLET ORAL EVERY 8 HOURS PRN
Qty: 21 TABLET | Refills: 0 | Status: ON HOLD | OUTPATIENT
Start: 2025-02-07 | End: 2025-02-14

## 2025-02-07 RX ORDER — VENLAFAXINE 75 MG/1
75 TABLET ORAL DAILY
Qty: 30 TABLET | Refills: 0 | Status: ON HOLD | OUTPATIENT
Start: 2025-02-08 | End: 2025-03-10

## 2025-02-07 RX ORDER — GABAPENTIN 100 MG/1
200 CAPSULE ORAL 3 TIMES DAILY
Qty: 180 CAPSULE | Refills: 0 | Status: ON HOLD | OUTPATIENT
Start: 2025-02-07 | End: 2025-03-09

## 2025-02-07 RX ORDER — OMEPRAZOLE 40 MG/1
40 CAPSULE, DELAYED RELEASE ORAL DAILY
Qty: 30 CAPSULE | Refills: 0 | Status: ON HOLD | OUTPATIENT
Start: 2025-02-07 | End: 2025-03-09

## 2025-02-07 RX ORDER — GLIPIZIDE 10 MG/1
10 TABLET ORAL
Qty: 100 TABLET | Refills: 3 | Status: ON HOLD | OUTPATIENT
Start: 2025-02-08 | End: 2026-03-15

## 2025-02-07 RX ORDER — LIDOCAINE 4 G/G
3 PATCH TOPICAL EVERY 24 HOURS
Qty: 90 PATCH | Refills: 0 | Status: SHIPPED | OUTPATIENT
Start: 2025-02-07 | End: 2025-02-10

## 2025-02-07 RX ORDER — PIOGLITAZONE 30 MG/1
15 TABLET ORAL DAILY
Qty: 50 TABLET | Refills: 3 | Status: ON HOLD | OUTPATIENT
Start: 2025-02-07 | End: 2026-03-14

## 2025-02-07 RX ADMIN — TIZANIDINE 2 MG: 4 TABLET ORAL at 13:41

## 2025-02-07 RX ADMIN — ENOXAPARIN SODIUM 40 MG: 100 INJECTION SUBCUTANEOUS at 18:21

## 2025-02-07 RX ADMIN — OXYCODONE 5 MG: 5 TABLET ORAL at 18:20

## 2025-02-07 RX ADMIN — GABAPENTIN 200 MG: 100 CAPSULE ORAL at 04:40

## 2025-02-07 RX ADMIN — GLIPIZIDE 10 MG: 10 TABLET ORAL at 08:30

## 2025-02-07 RX ADMIN — INSULIN LISPRO 1 UNITS: 100 INJECTION, SOLUTION INTRAVENOUS; SUBCUTANEOUS at 18:58

## 2025-02-07 RX ADMIN — OMEPRAZOLE 40 MG: 20 CAPSULE, DELAYED RELEASE ORAL at 04:40

## 2025-02-07 RX ADMIN — GABAPENTIN 200 MG: 100 CAPSULE ORAL at 13:41

## 2025-02-07 RX ADMIN — SITAGLIPTIN 100 MG: 100 TABLET, FILM COATED ORAL at 18:20

## 2025-02-07 RX ADMIN — OMEPRAZOLE 40 MG: 20 CAPSULE, DELAYED RELEASE ORAL at 18:20

## 2025-02-07 RX ADMIN — VENLAFAXINE HYDROCHLORIDE 75 MG: 75 TABLET ORAL at 04:40

## 2025-02-07 RX ADMIN — GABAPENTIN 200 MG: 100 CAPSULE ORAL at 18:20

## 2025-02-07 RX ADMIN — INSULIN LISPRO 1 UNITS: 100 INJECTION, SOLUTION INTRAVENOUS; SUBCUTANEOUS at 08:34

## 2025-02-07 RX ADMIN — OXYCODONE 5 MG: 5 TABLET ORAL at 08:28

## 2025-02-07 RX ADMIN — LIDOCAINE 3 PATCH: 4 PATCH TOPICAL at 18:21

## 2025-02-07 ASSESSMENT — PAIN DESCRIPTION - PAIN TYPE
TYPE: ACUTE PAIN;CHRONIC PAIN
TYPE: ACUTE PAIN
TYPE: ACUTE PAIN

## 2025-02-07 NOTE — CARE PLAN
The patient is Stable - Low risk of patient condition declining or worsening    Shift Goals  Clinical Goals: administer pain medication PRN, patient safety  Patient Goals: rest, pain control  Family Goals: KATHERINE    Progress made toward(s) clinical / shift goals:  A&Ox4. In no distress. Crowley catheter in place for neurogenic bladder. PRN oxycodone given for pain with good relief. POCT Glucose 182 at 2222.      Problem: Safety  Goal: Will remain free from injury  Outcome: Progressing     Problem: Pain Management  Goal: Pain level will decrease to patient's comfort goal  Outcome: Progressing     Problem: Infection  Goal: Will remain free from infection  Outcome: Progressing     Problem: Bowel Elimination  Goal: Establish and maintain regular bowel function  Outcome: Progressing       Patient is not progressing towards the following goals:

## 2025-02-07 NOTE — CARE PLAN
Problem: Safety  Goal: Will remain free from injury  2/6/2025 1827 by Kiran Fay R.N.  Outcome: Progressing  2/6/2025 1042 by Kiran Fay R.N.  Outcome: Progressing     Problem: Pain Management  Goal: Pain level will decrease to patient's comfort goal  2/6/2025 1827 by Kiran Fay R.N.  Outcome: Progressing  2/6/2025 1042 by Kiran Fay R.N.  Outcome: Progressing     Problem: Bowel Elimination  Goal: Establish and maintain regular bowel function  Outcome: Progressing     Problem: Skin Integrity  Goal: Risk for impaired skin integrity will decrease  Outcome: Progressing

## 2025-02-07 NOTE — DISCHARGE PLANNING
DC Transport Scheduled      Scheduled Date: 2/7/2025  Scheduled Time: 1830    Transport Type: Wheelchair  Destination: Home   Destination address: [address] [place] [address] [address] [place] [address]    Notified care team of scheduled transport via Voalte.     If there are any changes needed to the DC transportation scheduled, please contact [facility] at ext. [phone] between the hours of 6396-3357. If outside those hours, contact the ED Case Manager at ext. [phone].

## 2025-02-07 NOTE — DISCHARGE INSTR - CASE MGT
Meals on Wheels application submitted today 2/7/25. Phone number to follow up is [phone].     Application for [place] Medicaid caregiver resources emailed to [email or web address] today 2/7/25    Resource for caregiver self pay until medicaid waiver starts is:   [facility]   [address] [address]  Phone Number is: [phone]    Resource for application process for disability given at bedside, ensure you ask Primary Care Provider for documents to submit for disability.     [employer]   [address] [place] [address]  [phone]    Call to make an appointment with social security at [phone]

## 2025-02-07 NOTE — CONSULTS
"[facility]    INPATIENT ASSESSMENT    Name: Christoph Taylor  MRN: [id number]  : 1965  Age: 59 y.o.  Date of assessment: 2025  PCP: DIANA Tariq  Persons in attendance: Patient    HPI: Per Medical Record: \"Christoph Taylor is a 59-year-old male with prior suicidal ideation, poorly controlled diabetes mellitus type 2, housing insecurity and severe protein calorie malnutrition.  Admitted  for acute on chronic back pain.     Patient states that he is unable to take care of himself and has a hard time administering insulin, buying food for himself, taking his blood sugars. Additionally says that he has worsening neuropathy of the feet and hands. His pain is worse than ever. He has no additional help here.     In the ER he was found to be hyperglycemic and quite weak.  UA was concerning for possible UTI.  Patient was given 1 dose of IV cefazolin.  However, he denied urinary symptoms and antibiotics were not continued.      In terms of patient's weakness-MRI thoracic and lumbar notable for old compression fracture at T12 and multiple disc bulging without quinton central canal stenosis or cord impingement.  Neurosurgery was consulted.  Dr. Johnson recommending non operative management and outpatient follow-up for possible epidural injection.  PT OT recommending postacute rehabilitation.  However, no accepting facilities were identified.  Patient intermittently refusing to work with PT here.\" Patient was referred to Psychiatry for emotional support due to long length of stay and discharge planning challenges.       CHIEF COMPLAINT/PRESENTING ISSUE (as stated by Patient): Christoph Taylor is a 59 year old male seen sitting up in hospital bed, alert, oriented, speech clear and coherent, cooperative and willing to engage in psychotherapy session. Patient reports physical pain in his shoulder and back, rating the pain on a scale of 7 out of 10. Patient states his sleeping is \"OK\", and has been " "working with his sister on a discharge plan to a facility in [facility].   Patient continues to hold feelings of hopelessness regarding his relationship with his children. Clinician asked patient how he's feeling about moving closer to his children to which he responded with, \"It won't change anything\". Patient reports his children not visiting him while he's in the hospital stating he feels like they don't care about him. Clinician challenged patient by stating he's still in contact with his kids and they are preparing for his arrival in [place].  Patient expressed concern that he will have to surrender his dog of 13 years when he is admitted to the new facility. Patient states his dog is \"a member of the family\" and is a major emotional support for him. Clinician encouraged patient to find a solution where he might be able to visit the dog again once he's discharged from the facility.  Clinician provided support and encouragement while validating his desire to be involved in his care plan and to continue utilizing problem-solving skills. Patient was able to identify a long-term goal of living with his son in [place]. Patient continues to participate more in psychotherapy.  Chief Complaint   Patient presents with    Weakness     Increasing weakness last week, decreased dexterity with upper extremities baseline, thoracic back pain and worsening last x4 days, prior back surgeries, Rx Gabapentin    High Blood Sugar     With , has not been able to give self insulin last x4 days, at ER POC glucose 382          MENTAL STATUS              Participation: Active verbal participation  Grooming: Casual  Orientation: Alert  Behavior:  Cooperative  Eye contact: Limited  Mood: Depressed  Affect: Flat  Thought process: Circumstantial  Thought content: Within normal limits  Speech: Soft  Perception: Within normal limits  Memory:  Recent:  Adequate  Insight: Adequate  Judgment:  Adequate  Other:    Collateral " information:   Source:   Significant other present in person:    Significant other by telephone   [facility]   [facility]: Reviewed   Other:      Unable to complete full assessment due to:   Acute intoxication   Patient declined to participate/engage   Patient verbally unresponsive   Significant cognitive deficits   Significant perceptual distortions or behavioral disorganization   Other:             CLINICAL IMPRESSIONS:  Primary:  Major Depressive Disorder, Recurrent  Secondary:  Depression due to other medical condition                                       IDENTIFIED NEEDS/PLAN:  [Trigger DISPOSITION list for any items marked]      Imminent safety risk - self  Imminent safety risk - others     Acute substance withdrawal   Psychosis/Impaired reality testing    x Mood/anxiety   Substance use/Addictive behavior     Maladaptive behavior   Parent/child conflict     Family/Couples conflict   Biomedical     Housing   Financial      Legal  Occupational/Educational     Domestic violence   Other:       Legal Hold: N/A      Joon Anderson, Student  Farzana Tripathi, Ph.D., [facility]  2/7/2025     Length of Intervention: 30 minutes

## 2025-02-07 NOTE — DISCHARGE SUMMARY
Discharge Summary    CHIEF COMPLAINT ON ADMISSION  Chief Complaint   Patient presents with    Weakness     Increasing weakness last week, decreased dexterity with upper extremities baseline, thoracic back pain and worsening last x4 days, prior back surgeries, Rx Gabapentin    High Blood Sugar     With , has not been able to give self insulin last x4 days, at ER POC glucose 382       Reason for Admission  EMS     Admission Date  1/9/2025    CODE STATUS  Full Code    HPI & HOSPITAL COURSE  Christoph Taylor is a 59-year-old male with prior suicidal ideation, poorly controlled diabetes mellitus type 2, housing insecurity and severe protein calorie malnutrition.  Admitted 1/9 for acute on chronic back pain.     Patient states that he is unable to take care of himself and has a hard time administering insulin, buying food for himself, taking his blood sugars. Additionally says that he has worsening neuropathy of the feet and hands. His pain is worse than ever. He has no additional help here.     In the ER he was found to be hyperglycemic and quite weak.  UA was concerning for possible UTI.  Patient was given 1 dose of IV cefazolin.  However, he denied urinary symptoms and antibiotics were not continued.  Later during the hospitalization, he developed hypotension and possible pneumonia.  He was treated with antibiotics and IV fluid for sepsis.  His sepsis resolved.      In terms of patient's weakness-MRI thoracic and lumbar notable for old compression fracture at T12 and multiple disc bulging without quinton central canal stenosis or cord impingement.  Neurosurgery was consulted.  Dr. Johnson recommending non operative management and outpatient follow-up for possible epidural injection.  PT OT recommending postacute rehabilitation.  However, no accepting facilities were identified.  Patient intermittently refusing to work with PT here.  He is from [place].  The initial plan was to discharge to group home in  [place].  However, the group home there is not able to provide insulin injection.  His BG has been improving, we eventually transitioned insulin to p.o. diabetic meds.  His BG has been 150- 200.  We added additional Actos. he will continue glipizide, sitagliptin.  He was not able to tolerate metformin in the past due to severe vertigo.     Psych has been following with his major depression disorder.    Home health has been arranged prior to discharge.  Patient will also have a caregiver at home    Therefore, he is discharged in good and stable condition to home with organized home healthcare and close outpatient follow-up.    The patient met 2-midnight criteria for an inpatient stay at the time of discharge.    Discharge Date  2/7/2025    FOLLOW UP ITEMS POST DISCHARGE  - Follow up with primary care physician in 1 week.   -Follow-up with your psychiatrist  - Please take the medications as instructed    - Go to the local Emergency Department if you have any worsening condition.       DISCHARGE DIAGNOSES  Principal Problem:    Compression fracture of body of thoracic vertebra (HCC) (POA: Yes)  Active Problems:    CKD (chronic kidney disease) (POA: Yes)    Uncontrolled type 2 diabetes mellitus with hyperglycemia (HCC) (POA: Yes)    Elevated troponin (POA: Yes)    Acute hypoxic respiratory failure (HCC) (POA: Yes)    Physical debility (POA: Yes)    Bacteriuria (POA: Yes)    Acute respiratory failure with hypoxia (HCC) (POA: Yes)    Moderate episode of recurrent major depressive disorder (HCC) (POA: Unknown)    Sepsis (HCC) (POA: Unknown)  Resolved Problems:    * No resolved hospital problems. *      FOLLOW UP  No future appointments.  Orin Darnell, FABBY.P.R.N.  [id number] [address]  [phone]    Follow up in 1 week(s)  Please call your primary care provider to schedule, recent hospitalization follow up      MEDICATIONS ON DISCHARGE     Medication List        START taking these medications         Instructions   glipiZIDE 10 MG Tabs  Start taking on: February 8, 2025  Commonly known as: Glucotrol   Take 1 Tablet by mouth every morning before breakfast.  Dose: 10 mg     oxyCODONE immediate-release 5 MG Tabs  Commonly known as: Roxicodone   Take 1 Tablet by mouth every 8 hours as needed for Severe Pain for up to 7 days.  Dose: 5 mg     pioglitazone 30 MG Tabs  Commonly known as: Actos   Take 0.5 Tablets by mouth every day.  Dose: 15 mg     SITagliptin 100 MG Tabs  Commonly known as: Januvia   Take 1 Tablet by mouth every day.  Dose: 100 mg            CHANGE how you take these medications        Instructions   gabapentin 100 MG Caps  What changed:   how much to take  when to take this  Commonly known as: Neurontin   Take 2 Capsules by mouth 3 times a day for 30 days.  Dose: 200 mg     omeprazole 40 MG delayed-release capsule  What changed: when to take this  Commonly known as: PriLOSEC   Take 1 Capsule by mouth every day for 30 days.  Dose: 40 mg     venlafaxine 75 MG Tabs  Start taking on: February 8, 2025  What changed:   medication strength  how much to take  Commonly known as: Effexor   Take 1 Tablet by mouth every day for 30 days.  Dose: 75 mg            CONTINUE taking these medications        Instructions   Alcohol Prep 70 % Pads   Doctor's comments: Per formulary preference. ICD-10 code: E11.65 Uncontrolled type 2 Diabetes Mellitus  Wipe site with prep pad prior to injection.     BD Pen Needle [place] U/F  Generic drug: Insulin Pen Needle 32 G x 4 mm   Doctor's comments: Per patient/formulary preference. ICD-10 code: E11.65 Uncontrolled type 2 Diabetes Mellitus  Use one pen needle in pen device to inject insulin four times daily.     FreeStyle Barb 14 Day [place]   To use with freestyle barb sensor     FreeStyle Barb 14 Day Sensor Misc   1 Each every 14 days.  Dose: 1 Each     lidocaine 4 % Ptch  Commonly known as: Asperflex   Place 3 Patches on the skin every 24 hours for 30 days.  Dose: 3  Patch     OneTouch Ultra strip  Generic drug: glucose blood   Use 1 Strip for Blood glucose checks  Dose: 1 Each     TechLite Lancets Misc   Doctor's comments: Or per formulary preference. ICD-10 code: E11.65 Uncontrolled type 2 Diabetes Mellitus  Use one lancet to test blood sugar three times daily before meals.     True Metrix Meter w/Device Kit   Doctor's comments: Or per formulary preference. ICD-10 code: E11.65 Uncontrolled type 2 Diabetes Mellitus  Test blood sugar as recommended by provider.            STOP taking these medications      fluconazole 100 MG Tabs  Commonly known as: Diflucan     Lantus SoloStar 100 UNIT/ML Sopn injection  Generic drug: insulin glargine     metoclopramide 5 MG tablet  Commonly known as: Reglan     NovoLOG FlexPen 100 UNIT/ML solution for injection  Generic drug: insulin aspart              Allergies  Allergies   Allergen Reactions    Ketamine Unspecified     aggressive       DIET  Orders Placed This Encounter   Procedures    Diet Order Diet: Consistent CHO (Diabetic)     Standing Status:   Standing     Number of Occurrences:   1     Order Specific Question:   Diet:     Answer:   Consistent CHO (Diabetic) [4]       ACTIVITY  As tolerated.  Weight bearing as tolerated    CONSULTATIONS  Psychiatry, neurosurgeon    PROCEDURES      LABORATORY  Lab Results   Component Value Date    SODIUM 139 02/05/2025    POTASSIUM 4.3 02/05/2025    CHLORIDE 100 02/05/2025    CO2 32 02/05/2025    GLUCOSE 153 (H) 02/05/2025    BUN 28 (H) 02/05/2025    CREATININE 0.75 02/05/2025        Lab Results   Component Value Date    WBC 4.5 (L) 02/05/2025    HEMOGLOBIN 10.6 (L) 02/05/2025    HEMATOCRIT 33.7 (L) 02/05/2025    PLATELETCT 255 02/05/2025        Total time of the discharge process exceeds 38 minutes.

## 2025-02-07 NOTE — PROGRESS NOTES
Hospital Medicine Daily Progress Note    Date of Service      Chief Complaint  Christoph Taylor is a 59 y.o. male admitted 1/9/2025 with weakness.    Hospital Course  Christoph Taylor is a 59-year-old male with prior suicidal ideation, poorly controlled diabetes mellitus type 2, housing insecurity and severe protein calorie malnutrition.  Admitted 1/9 for acute on chronic back pain.    Patient states that he is unable to take care of himself and has a hard time administering insulin, buying food for himself, taking his blood sugars. Additionally says that he has worsening neuropathy of the feet and hands. His pain is worse than ever. He has no additional help here.    In the ER he was found to be hyperglycemic and quite weak.  UA was concerning for possible UTI.  Patient was given 1 dose of IV cefazolin.  However, he denied urinary symptoms and antibiotics were not continued.     In terms of patient's weakness-MRI thoracic and lumbar notable for old compression fracture at T12 and multiple disc bulging without quinton central canal stenosis or cord impingement.  Neurosurgery was consulted.  Dr. Johnson recommending non operative management and outpatient follow-up for possible epidural injection.  PT OT recommending postacute rehabilitation.  However, no accepting facilities were identified.  Patient intermittently refusing to work with PT here.    Home health has been arranged prior to discharge.    Interval Problem Update  1/14: Vitals stable overnight.  -129.  Patient is medically clear for discharge pending placement.    1/15: Vitals remained stable.   through 156.  Glucose ranging 104 through 246.  Case management is working hard to obtain patient's insurance card to evaluate benefits for postacute rehabilitation.  Patient is medically cleared to discharge once insurance barrier has been resolved.    1/16: Vitals stable.  Glucose this a.m. 176.  Range 2 29 through 3 05.  Increase Lantus to 25 units  in the p.m.  Multiple long discussions with patient, case management and nursing staff regarding discharge plan.  Patient feels unsafe to discharge to his home.  He has 5 steps.  He does not have a ramp.  He is largely wheelchair-bound due to chronic pain.    1/17: Vital stable overnight.  -130.  Glucose improved with increase Lantus dosing.  A.m. blood sugar 104.  Patient is now being followed by complex discharge committee.    1/18: Vitals notable for SBP .  Glucose ranging 125 through 196.  Patient required 1 unit sliding scale insulin yesterday.  Patient remains medically clear for discharge though a safe discharge plan has not been arranged yet.  Encouraged ambulation at least 3 times daily.    1/19: Vital stable overnight.   through 126.  Glucose range .  Required 2 units SSI over last 24 hours.    1/20: Vitals stable overnight.  Patient endorsing depression and refusal to participate in medical therapies.  I have placed a referral for inpatient psychology/behavioral health.  Increase venlafaxine to 75 mg.  Continue to encourage participation in ambulation and patient rehabilitation.    1/21 Patient able to self transfer to chair, reports ongoing low back pain, pain level of 7/10  States that he is now wheelchair-bound, pending placement,  to assist  Patient with hypoglycemic episode this a.m. was given juice, unable to self administer insulin at home, will need placement assistance    1/22 no new events, sleeping arousable, AFO delivered for PT reeval    1/23 reports low back pain, able to transfer to chair, PT eval    1/24 ongoing lbp, min activity, Has not worked with PT with AFO, to reconsult  Improving blood sugar    1/25 patient with hypoglycemia this morning, blood sugar of 69, arousable improved after dextrose administration, Lantus decreased patient did work with PT with AFO, encourage activity    1/26 improved blood sugar, patient sitting up in chair, no new  complaints  Patient did have increased activity with AFO, continue to encourage activity    1/27 patient was noted to be hypotensive with increasing respiratory failure requiring 4-5 L oxygen supplementation, chest x-ray with infiltrates  Patient was started on septic protocol, given 4 L of IV fluid hydration as well as initiated on IV antibiotics, UA is negative  Patient does not recall events of last evening, patient feels mild low back pain, patient does report dry cough, still on 4 to 5 L, taper and oxygen needs  Encourage activity    1/28 TAKING OVER CARE  Afebrile, normal HR/RR, 's-140's, SpO2 95-97% on 2 L NC.  No leukocytosis, stable anemia, mild thrombocytopenia.  LFT's improving, blood cultures remain negative.  Repeat procalcitonin in the morning, check CRP, CBC.  Suspect will be de-escalating antibiotics tomorrow.  Feeling general fatigue.  Tolerating p.o. intake.    1/29  Looking a bit better today.  Afebrile normal pulse and respiratory rate normal blood pressure, weaned off of supplemental oxygen today.  Procalcitonin checked from 30 down to 16, continue antibiotics for now.  Check magnesium, 1.3, IV replacement ordered.  Having some aches and pains, analgesia adjusted.  Tolerating p.o. intake.    1/30  Afebrile with normal vitals, weaned down to room air.  Sitting up edge of bed eating his meal, no acute complaints, tolerating p.o. intake.  Will work with pharmacy to see about ordering him a continuous glucose monitor as well as insulin pump to help alleviate any insulin administration concerns holding him up from getting into a group home.  Patient was interested in this idea as well.    1/31  Will order insulin pump and CGM and see if we can get it through approved services which could help him get discharged to a group home.   No new complaints, tolerating diet.     2/2  Tolerating diet, no acute complaints, normal vitals.  Will need to see if we can get approved services for CGM and pump  (working with pharmacist to get it ordered) to help facilitate a discharge to group home.    2/3  Patient doing well, no acute changes or complaints.  Discussed with diabetes educator if insulin pump and CGM would work, apparently the plan will not work.  Working with pharmacy to see if we can of the p.o. regimen to help control his diabetes so that he does not have to rely on insulin as it appears that has been a barrier to get him to outpatient living arrangements.  Work with pharmacy to adjust p.o. regimen.    2/4 I have seen and examined the patient at bedside    Patient reported right shoulder pain, 8 out of 10, worsening with movement.  I ordered the x-ray    BG has been improving.  Patient only requires minimal insulin.  Will start a trial of with p.o. diabetes meds.  Patient was declined by group home due to insulin injection.   I started glipizide 5 mg daily, may increase to 10 mg if BG stays high.  May add other p.o. diabetes meds if needed.     Patient wants to talk to psych  I have reconsulted psych    2/5 BG has been under 200, continue glipizide.  May adjust the dose if needed  Continue SSI  Hypoglycemia protocol    2/6 increased glipizide to 10 mg, BG<200, in good range  Continue SSI  Hypoglycemia protocol    Right shoulder x-ray no acute fracture/infection; consistent with degenerative changes  Pain control    I have discussed this patient's plan of care and discharge plan at IDT rounds today with Case Management, Nursing, Nursing leadership, and other members of the IDT team.    Consultants/Specialty  None at this time     Code Status  Full Code    Disposition  Medically Cleared  I have placed the appropriate orders for post-discharge needs.    Review of Systems  Review of Systems   Constitutional:  Positive for malaise/fatigue. Negative for chills, diaphoresis and fever.   Eyes:  Negative for blurred vision and double vision.   Respiratory:  Positive for cough and shortness of breath. Negative for  sputum production and wheezing.    Cardiovascular:  Negative for palpitations.   Gastrointestinal:  Negative for abdominal pain and nausea.   Genitourinary:  Negative for dysuria.   Musculoskeletal:  Positive for back pain and myalgias.   Neurological:  Positive for weakness. Negative for dizziness and headaches.   Psychiatric/Behavioral:  Negative for depression. The patient is not nervous/anxious.         Physical Exam  Temp:  [35.9 °C (96.7 °F)-36.8 °C (98.2 °F)] 35.9 °C (96.7 °F)  Pulse:  [71-77] 77  Resp:  [16-18] 18  BP: (107-134)/(68-87) 112/71  SpO2:  [90 %-93 %] 90 %    Physical Exam  Vitals and nursing note reviewed.   Constitutional:       General: He is not in acute distress.     Appearance: Normal appearance. He is not ill-appearing, toxic-appearing or diaphoretic.   HENT:      Mouth/Throat:      Mouth: Mucous membranes are moist.      Pharynx: Oropharynx is clear.   Eyes:      General: No scleral icterus.     Extraocular Movements: Extraocular movements intact.      Conjunctiva/sclera: Conjunctivae normal.   Cardiovascular:      Rate and Rhythm: Regular rhythm.      Pulses: Normal pulses.   Pulmonary:      Effort: Pulmonary effort is normal. No respiratory distress.      Breath sounds: No wheezing, rhonchi or rales.   Abdominal:      General: There is no distension.      Tenderness: There is no abdominal tenderness. There is no guarding or rebound.   Musculoskeletal:         General: No swelling or tenderness.      Cervical back: Normal range of motion. No rigidity or tenderness.   Skin:     General: Skin is warm.      Coloration: Skin is not jaundiced.      Findings: No erythema.   Neurological:      Mental Status: He is alert and oriented to person, place, and time. Mental status is at baseline.      Cranial Nerves: No cranial nerve deficit.   Psychiatric:         Mood and Affect: Mood normal.         Behavior: Behavior normal.         Thought Content: Thought content normal.         Judgment:  Judgment normal.         Fluids    Intake/Output Summary (Last 24 hours) at 2/6/2025 1818  Last data filed at 2/6/2025 1415  Gross per 24 hour   Intake 600 ml   Output --   Net 600 ml        Laboratory  Recent Labs     02/05/25  1353   WBC 4.5*   RBC 4.17*   HEMOGLOBIN 10.6*   HEMATOCRIT 33.7*   MCV 80.8*   MCH 25.4*   MCHC 31.5*   RDW 40.2   PLATELETCT 255   MPV 8.8*       Recent Labs     02/05/25  1353   SODIUM 139   POTASSIUM 4.3   CHLORIDE 100   CO2 32   GLUCOSE 153*   BUN 28*   CREATININE 0.75   CALCIUM 8.8                       Imaging  DX-SHOULDER 2+ RIGHT   Final Result      1.  No fracture or dislocation of RIGHT shoulder.   2.  Degenerative change of AC joint.      US-RUQ   Final Result      1.  Distended gallbladder with gallbladder sludge and a small calcified gallstone. No discrete sonographic evidence of acute cholecystitis.   2.  Mildly dilated main portal vein which could indicate portal hypertension      DX-CHEST-PORTABLE (1 VIEW)   Final Result      Left basilar atelectasis. Underlying infection is possible.      MR-LUMBAR SPINE-W/O   Final Result      1.  T12 chronic compression fracture with anterior wedge deformity.   2.  Postoperative changes with interval L4-5 laminectomy since prior MRI study. There is now laminectomy at L4-5-S1 and there is now posterior fusion with transpedicular screw fixation at L4-L5. Relief of previously seen severe central stenosis at L4-5.   3.  Multilevel degenerative changes as detailed for each level above in the body of report.      MR-THORACIC SPINE-W/O   Final Result      1.  T12 old compression fracture with anterior wedge deformity. Associated mild lower thoracic kyphosis.   2.  T2 old superior endplate compression fracture.   3.  Multilevel disc bulges T3-T4, T7-T8, T8-T9, T11-12, T12-L1. Variable mass effect on the ventral thecal sac without quinton central stenosis or cord impingement at any thoracic level.   4.  No myelopathic cord signal at any thoracic  level.      DX-CHEST-PORTABLE (1 VIEW)   Final Result      No acute cardiopulmonary disease evident.           Assessment/Plan  * Compression fracture of body of thoracic vertebra (HCC)- (present on admission)  Assessment & Plan  MRI T-spine with T12 old compression fracture, T2 old superior endplate compression fracture, multiple level disc bulges T3-4 T7-T8 T8-T9, T11-T12, variable mass effect with out  central stenosis or cord impingement, MRI L-spine with T12 compression fracture, previous L4-L5 laminectomy, multilevel degenerative changes.  - Neurosurgery has been consulted  - Recommending non operative management at this time  - Continue to follow-up outpatient for possible epidural  - Continue gabapentin, lidocaine and oxycodone as needed for pain management  - PT OT recommending postacute rehabilitation  - I personally reviewed MRI images with patient at bedside 1/16 1/21 placement pending, f/u with CM  - ongoing LBP, schedule flexeril    1/22 PT reeval with AFO now available  Pain control, encourage activity, placement pending    1/23 PT  with AFO, encourage activity, pending placement, CM to assist, ? GH  1/24 PT to work with patient with AFO, consult replaced  Encourage activity    1/25 patient worked with PT with AFO, encourage activity    1/26 cont activity, placement pending    1/27 difficult discharge,  assisting  Considering transfer to another state to be closer to family, patient can no longer live alone, cannot self administer insulin or take care of himself  Encourage activity  Placement pending    Sepsis (HCC)  Assessment & Plan  This is Sepsis Not present on admission  SIRS criteria identified on my evaluation include: Tachycardia, with heart rate greater than 90 BPM and Tachypnea, with respirations greater than 20 per minute  Clinical indicators of end organ dysfunction include Hypotension with systolic blood pressure less than 90 or MAP less than 65, Lactic Acid greater than 2,  and Acute Respiratory Failure - (mechanical ventilation or BiPap or PaO2/FiO2 ratio < 300)  Source is pneumonia/lungs  Sepsis protocol initiated  Crystalloid Fluid Administration: Fluid resuscitation ordered per standard protocol - 30 mL/kg per current or ideal body weight  IV antibiotics as appropriate for source of sepsis  Reassessment: I have reassessed the patient's hemodynamic status    1/27 septic protocol initiated, received 4 L of fluid hydration, improved blood pressure, lactic acidosis resolved, will follow-up blood a.m. lactic acid level, repeat is at 2  Borderline hypotension, continue fluid hydration  Patient now on IV antibiotics, follow-up cultures  Taper oxygen as tolerated, chest x-ray concerning for infiltrates    Moderate episode of recurrent major depressive disorder (HCC)  Assessment & Plan  Patient endorsing depression.  Unwillingness to participate in his rehabilitation.  Endorses feelings of helplessness and hopelessness.  Per nursing staff he has expressed SI without a plan.  He does not endorse SI to me; on 2 separate occasions.  - Continue to encourage patient to participate in his rehabilitation.  - Increase venlafaxine to 75 mg  - Inpatient behavioral health referral has been placed.  - I do not see an indication to initiate a legal hold at this point.  Patient denies SI and SI plan to me.  Per nursing staff patient does become agitated when his snacks are withheld.  We will continue to monitor the situation closely.    Bacteriuria- (present on admission)  Assessment & Plan  No abx's for now given lack of symptoms    Physical debility- (present on admission)  Assessment & Plan  MRI with compression T2 and T12 fracture, multilevel bulging disc through thoracic spine.  No cord impingement.  Neurosurgery recommended nonoperative management  Follow-up for epidural injection as outpatient  Placement is pending    1/21 Patient reports ambulatory status prior to this admission, to follow-up  with orthopedic surgery for AFO evaluation  , Will reconsult PT OT for AFO as needed, will consult orthopedic surgery as needed  -Patient reports low back pain, has Zanaflex as needed, will add Flexeril 3 times daily  Continue pain control  Encourage activity    Acute hypoxic respiratory failure (HCC)- (present on admission)  Assessment & Plan  Resolved  Now on room air, continue to monitor oxygen requirement    Elevated troponin- (present on admission)  Assessment & Plan  Chronically elevated. Baseline  - no further workup    Uncontrolled type 2 diabetes mellitus with hyperglycemia (HCC)- (present on admission)  Assessment & Plan  A1C 9.8  - Continue Lantus to 25 units in the evening  - Continue sliding scale insulin; has required 2 units SSI in last 24 hours  - Patient states he is unable to drop insulin at home due to hand coordination issues.  We will try to minimize sliding scale insulin and discharged with only Lantus pens.  - Monitor BG trend.   - Accu-Cheks before meals and at bedtime.   - Goal to keep BG between 140-180 per 2019 ADA guidelines     1/21 hypoglycemia this am, decrease Lantus to 18 units, cont ssi  - f/u am labs    1/22 bs improved, on lantus 18 units, cont and monitor  ? GH    1/23 improvng bs, increase Lantus 20 u, add metformin, monitor    1/24 improving, cont lantus, f/u am bmp, consider increasing metformin    1/25 hypoglycemia this a.m., decreased metformin and Lantus, monitor closely    1/26 improved bs, now on lantus 10u, monitor, encourage activity    1/27 improved control, continue Lantus    CKD (chronic kidney disease)- (present on admission)  Assessment & Plan  Creatinine stable at 0.57  Stable, secondary to diabetes       VTE prophylaxis: Lovenox      Total time spent 36 minutes. I spent greater than 50% of the time for patient care, counseling, and coordination on this date, including unit/floor time, and face-to-face time with the patient as per interval events, my own review of  patient's imaging and lab analysis and developing my assessment and plan above.        VTE Selection

## 2025-02-08 NOTE — DIETARY
Nutrition Services: Follow-up for PO Intake   Day 28 of admit. Christoph Taylor is a 59 y.o. male with admitting DX of Physical debility [R53.81]  Acute respiratory failure with hypoxia (HCC) [J96.01]    PO Intake:   >75% x 6 meals  50-75% x 3 meals  Last BM 2/7/25.  Pertinent Medications: insulin, metformin, omeprazole       Current diet order:   Consistent CHO diet       Food and Nutrition-Related Knowledge Deficit related to lack or prior education as evidenced by need for further education.     Nutrition Dx Status: Ongoing      Problem: Nutritional:  Goal: Achieve adequate nutritional intake  Description: Patient will consume >75% of meals  Outcome: Met         Plan/ Recommendations:      Encourage intake of meals, goal for >75% consumption from meals and/or supplements  Document intake of all meals as % taken in ADL's to provide interdisciplinary communication across all shifts.   Monitor weight.  Nutrition rep available to see patient for ongoing meal and snack preferences.       RD following

## 2025-02-08 NOTE — CARE PLAN
The patient is Stable - Low risk of patient condition declining or worsening    Shift Goals  Clinical Goals: Pts pain will be managed throughout shift  Patient Goals: rest; pain control  Family Goals: KATHERINE    Progress made toward(s) clinical / shift goals:  Pts pain has been maintained throughout shift due to medication administration, rest, repositioning, ambulating, and communication.     Patient is not progressing towards the following goals: NA

## 2025-02-08 NOTE — PROGRESS NOTES
IV removed by previous shift. GMT at bedside w/ wheelchair to  pt. Pt dressed with ortho boot and shoes on, [id number] and all paperwork in his possession. Discussed d/c paperwork and instructions w/ pt, answered questions, pt verbalized understanding. Pt wheeled out by GMT w/ all possessions.

## 2025-02-10 ENCOUNTER — APPOINTMENT (OUTPATIENT)
Dept: RADIOLOGY | Facility: MEDICAL CENTER | Age: 60
DRG: 696 | End: 2025-02-10
Attending: EMERGENCY MEDICINE
Payer: COMMERCIAL

## 2025-02-10 ENCOUNTER — HOSPITAL ENCOUNTER (INPATIENT)
Facility: MEDICAL CENTER | Age: 60
End: 2025-02-10
Attending: EMERGENCY MEDICINE
Payer: COMMERCIAL

## 2025-02-10 ENCOUNTER — APPOINTMENT (OUTPATIENT)
Dept: RADIOLOGY | Facility: MEDICAL CENTER | Age: 60
End: 2025-02-10
Attending: EMERGENCY MEDICINE
Payer: COMMERCIAL
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
## 2025-02-10 DIAGNOSIS — E11.10 DKA, TYPE 2, NOT AT GOAL (HCC): ICD-10-CM

## 2025-02-10 DIAGNOSIS — M54.50 LUMBAR BACK PAIN: ICD-10-CM

## 2025-02-10 DIAGNOSIS — K21.9 GASTROESOPHAGEAL REFLUX DISEASE WITHOUT ESOPHAGITIS: ICD-10-CM

## 2025-02-10 DIAGNOSIS — M47.812 CERVICAL SPONDYLOSIS WITHOUT MYELOPATHY: ICD-10-CM

## 2025-02-10 DIAGNOSIS — F33.1 MODERATE EPISODE OF RECURRENT MAJOR DEPRESSIVE DISORDER (HCC): ICD-10-CM

## 2025-02-10 DIAGNOSIS — M54.9 PAIN, UPPER BACK: ICD-10-CM

## 2025-02-10 LAB
ALBUMIN SERPL BCP-MCNC: 3.7 G/DL (ref 3.2–4.9)
ALBUMIN/GLOB SERPL: 1.2 G/DL
ALP SERPL-CCNC: 96 U/L (ref 30–99)
ALT SERPL-CCNC: 15 U/L (ref 2–50)
ANION GAP SERPL CALC-SCNC: 10 MMOL/L (ref 7–16)
AST SERPL-CCNC: 22 U/L (ref 12–45)
BASOPHILS # BLD AUTO: 0.2 % (ref 0–1.8)
BASOPHILS # BLD: 0.01 K/UL (ref 0–0.12)
BILIRUB SERPL-MCNC: 0.3 MG/DL (ref 0.1–1.5)
BUN SERPL-MCNC: 18 MG/DL (ref 8–22)
CALCIUM ALBUM COR SERPL-MCNC: 9.1 MG/DL (ref 8.5–10.5)
CALCIUM SERPL-MCNC: 8.9 MG/DL (ref 8.5–10.5)
CHLORIDE SERPL-SCNC: 99 MMOL/L (ref 96–112)
CO2 SERPL-SCNC: 28 MMOL/L (ref 20–33)
CREAT SERPL-MCNC: 0.89 MG/DL (ref 0.5–1.4)
EKG IMPRESSION: NORMAL
EOSINOPHIL # BLD AUTO: 0.07 K/UL (ref 0–0.51)
EOSINOPHIL NFR BLD: 1.6 % (ref 0–6.9)
ERYTHROCYTE [DISTWIDTH] IN BLOOD BY AUTOMATED COUNT: 38.2 FL (ref 35.9–50)
GFR SERPLBLD CREATININE-BSD FMLA CKD-EPI: 98 ML/MIN/1.73 M 2
GLOBULIN SER CALC-MCNC: 3 G/DL (ref 1.9–3.5)
GLUCOSE SERPL-MCNC: 235 MG/DL (ref 65–99)
HCT VFR BLD AUTO: 33.9 % (ref 42–52)
HGB BLD-MCNC: 10.7 G/DL (ref 14–18)
IMM GRANULOCYTES # BLD AUTO: 0.01 K/UL (ref 0–0.11)
IMM GRANULOCYTES NFR BLD AUTO: 0.2 % (ref 0–0.9)
LYMPHOCYTES # BLD AUTO: 0.75 K/UL (ref 1–4.8)
LYMPHOCYTES NFR BLD: 16.7 % (ref 22–41)
MAGNESIUM SERPL-MCNC: 1.8 MG/DL (ref 1.5–2.5)
MCH RBC QN AUTO: 25.2 PG (ref 27–33)
MCHC RBC AUTO-ENTMCNC: 31.6 G/DL (ref 32.3–36.5)
MCV RBC AUTO: 80 FL (ref 81.4–97.8)
MONOCYTES # BLD AUTO: 0.26 K/UL (ref 0–0.85)
MONOCYTES NFR BLD AUTO: 5.8 % (ref 0–13.4)
NEUTROPHILS # BLD AUTO: 3.4 K/UL (ref 1.82–7.42)
NEUTROPHILS NFR BLD: 75.5 % (ref 44–72)
NRBC # BLD AUTO: 0 K/UL
NRBC BLD-RTO: 0 /100 WBC (ref 0–0.2)
PLATELET # BLD AUTO: 259 K/UL (ref 164–446)
PMV BLD AUTO: 9.6 FL (ref 9–12.9)
POC BREATHALIZER: 0 PERCENT (ref 0–0.01)
POC BREATHALIZER: 0 PERCENT (ref 0–0.01)
POTASSIUM SERPL-SCNC: 4.3 MMOL/L (ref 3.6–5.5)
PROT SERPL-MCNC: 6.7 G/DL (ref 6–8.2)
RBC # BLD AUTO: 4.24 M/UL (ref 4.7–6.1)
SODIUM SERPL-SCNC: 137 MMOL/L (ref 135–145)
WBC # BLD AUTO: 4.5 K/UL (ref 4.8–10.8)

## 2025-02-10 PROCEDURE — 72128 CT CHEST SPINE W/O DYE: CPT

## 2025-02-10 PROCEDURE — 93005 ELECTROCARDIOGRAM TRACING: CPT | Mod: TC

## 2025-02-10 PROCEDURE — 93005 ELECTROCARDIOGRAM TRACING: CPT | Mod: TC | Performed by: EMERGENCY MEDICINE

## 2025-02-10 PROCEDURE — 700102 HCHG RX REV CODE 250 W/ 637 OVERRIDE(OP): Performed by: EMERGENCY MEDICINE

## 2025-02-10 PROCEDURE — 302970 POC BREATHALIZER: Performed by: EMERGENCY MEDICINE

## 2025-02-10 PROCEDURE — 90839 PSYTX CRISIS INITIAL 60 MIN: CPT

## 2025-02-10 PROCEDURE — A9270 NON-COVERED ITEM OR SERVICE: HCPCS | Performed by: EMERGENCY MEDICINE

## 2025-02-10 PROCEDURE — 72131 CT LUMBAR SPINE W/O DYE: CPT

## 2025-02-10 PROCEDURE — 36415 COLL VENOUS BLD VENIPUNCTURE: CPT

## 2025-02-10 PROCEDURE — 302970 POC BREATHALIZER

## 2025-02-10 PROCEDURE — 99285 EMERGENCY DEPT VISIT HI MDM: CPT

## 2025-02-10 PROCEDURE — 72125 CT NECK SPINE W/O DYE: CPT

## 2025-02-10 PROCEDURE — 85025 COMPLETE CBC W/AUTO DIFF WBC: CPT

## 2025-02-10 PROCEDURE — 80053 COMPREHEN METABOLIC PANEL: CPT

## 2025-02-10 PROCEDURE — 70450 CT HEAD/BRAIN W/O DYE: CPT

## 2025-02-10 PROCEDURE — 83735 ASSAY OF MAGNESIUM: CPT

## 2025-02-10 RX ORDER — OXYCODONE AND ACETAMINOPHEN 5; 325 MG/1; MG/1
1 TABLET ORAL ONCE
Status: COMPLETED | OUTPATIENT
Start: 2025-02-10 | End: 2025-02-10

## 2025-02-10 RX ORDER — OXYCODONE HYDROCHLORIDE 5 MG/1
5 TABLET ORAL EVERY 4 HOURS PRN
Status: DISCONTINUED | OUTPATIENT
Start: 2025-02-10 | End: 2025-02-13

## 2025-02-10 RX ADMIN — OXYCODONE 5 MG: 5 TABLET ORAL at 20:50

## 2025-02-10 RX ADMIN — OXYCODONE HYDROCHLORIDE AND ACETAMINOPHEN 1 TABLET: 5; 325 TABLET ORAL at 16:57

## 2025-02-10 ASSESSMENT — PAIN DESCRIPTION - PAIN TYPE
TYPE: ACUTE PAIN;CHRONIC PAIN
TYPE: ACUTE PAIN
TYPE: ACUTE PAIN

## 2025-02-10 ASSESSMENT — FIBROSIS 4 INDEX: FIB4 SCORE: 0.82

## 2025-02-10 NOTE — ED NOTES
"PIV placed by EMS pta with FSBS 268.  EMS also reported pt is diabetic and \"has no way to check sugar at home\".  EMS reported pt was supposed to go to SNF from [facility] but \"something with insurance\" and pt was unable to get accepted.  "

## 2025-02-10 NOTE — ED TRIAGE NOTES
"Christoph Taylor  59 y.o.  male  [facility] from home for     Chief Complaint   Patient presents with    T-5000 FALL    Back Pain     Per report, pt recently here for T12 fracture and today had a mechanical GLF at home landing onto R side.  Pt reports R side leg/hip and back pain.  EMS reports no LOC, + head strike but not obvious trauma to his head.  Pt does not take blood thinners.  Pt pale, hypotensive on arrival by EMS 87/53.  Pt also endorses SI x \"a couple of months\", has considered shooting himself \"but I have not been able to pull the trigger\".  Pt reports he does have firearms at home.  Charge RN aware.    BP (!) 82/54   Pulse 76   Temp 36.9 °C (98.4 °F) (Temporal)   Resp 16   Ht 1.829 m (6')   Wt 72.6 kg (160 lb)   SpO2 97%   BMI 21.70 kg/m²     "

## 2025-02-10 NOTE — ED PROVIDER NOTES
ED Provider Note    CHIEF COMPLAINT  Chief Complaint   Patient presents with    T-5000 FALL    Back Pain       EXTERNAL RECORDS REVIEWED  Discharge summary completed on 2/7/2025  Patient states that he is unable to take care of himself and has a hard time administering insulin, buying food for himself, taking his blood sugars. Additionally says that he has worsening neuropathy of the feet and hands. His pain is worse than ever. He has no additional help here.     In the ER he was found to be hyperglycemic and quite weak.  UA was concerning for possible UTI.  Patient was given 1 dose of IV cefazolin.  However, he denied urinary symptoms and antibiotics were not continued.  Later during the hospitalization, he developed hypotension and possible pneumonia.  He was treated with antibiotics and IV fluid for sepsis.  His sepsis resolved.      In terms of patient's weakness-MRI thoracic and lumbar notable for old compression fracture at T12 and multiple disc bulging without quinton central canal stenosis or cord impingement.  Neurosurgery was consulted.  Dr. Johnson recommending non operative management and outpatient follow-up for possible epidural injection.  PT OT recommending postacute rehabilitation.  However, no accepting facilities were identified.  Patient intermittently refusing to work with PT here.  He is from [place].  The initial plan was to discharge to group home in [place].  However, the group home there is not able to provide insulin injection.  His BG has been improving, we eventually transitioned insulin to p.o. diabetic meds.  His BG has been 150- 200.  We added additional Actos. he will continue glipizide, sitagliptin.  He was not able to tolerate metformin in the past due to severe vertigo.   HPI/CHON Hawthorne Warren Taylor is a 59 y.o. male who presents stating he was transferred from his wheelchair to the bed and slipped fell landing on his back and pain in his head.  He is complaining of  headache, back pain.  Denies loss of sensation or strength to arms or legs that is new, fever, shakes, chills, sweats, nausea, vomiting.    PAST MEDICAL HISTORY   has a past medical history of Abnormal electrocardiogram (ECG) (EKG) (11/08/2021), Acute cystitis without hematuria (01/26/2024), Acute esophagitis (07/28/2022), Acute hypoxic respiratory failure (HCC) (07/22/2024), Acute metabolic encephalopathy (06/09/2023), Acute on chronic anemia (05/06/2024), Acute on chronic respiratory failure (HCC) (05/20/2024), Acute respiratory failure with hypoxia ([facility]) (07/25/2024), Acute superficial gastritis without hemorrhage (05/21/2024), Agitation (06/09/2024), KIRSTAL (acute kidney injury) ([facility]) (11/08/2021), AP (abdominal pain) (06/08/2023), Chest pain in adult (11/08/2021), Chronic renal insufficiency (06/04/2024), CKD (chronic kidney disease) stage 3, GFR 30-59 ml/min (11/07/2021), Diabetes ([facility]), Diabetic ketoacidosis without coma ([facility]) (11/07/2021), Diabetic ketoacidosis without coma associated with type 2 diabetes mellitus ([facility]) (11/07/2021), Diarrhea (03/18/2022), DKA, type 1, not at goal ([facility]) (07/28/2022), Elevated troponin (06/10/2023), Esophagitis (07/28/2022), [place] (hard of hearing), Hypercholesteremia, Impaction of intestine ([facility]) (05/21/2024), Intractable left upper quadrant abdominal pain (05/21/2024), Leukocytosis (05/20/2024), Metabolic acidosis, increased anion gap (11/07/2021), Syncope (06/09/2023), and Transaminitis (06/12/2024).    SURGICAL HISTORY   has a past surgical history that includes other; upper gi endoscopy,diagnosis (N/A, 3/14/2023); finger or hand incision and drainage (Right, 6/12/2023); cervical disk and fusion anterior (N/A, 6/4/2024); fusion, spine, lumbar, plif (N/A, 6/7/2024); lumbar laminectomy diskectomy (N/A, 6/7/2024); and upper gi endoscopy,diagnosis (N/A, 12/9/2024).    FAMILY HISTORY  Family History   Problem Relation Age of Onset    Heart Disease Father        SOCIAL  HISTORY  Social History     Tobacco Use    Smoking status: Never    Smokeless tobacco: Never   Vaping Use    Vaping status: Never Used   Substance and Sexual Activity    Alcohol use: Yes     Comment: occ    Drug use: Not Currently    Sexual activity: Not on file       CURRENT MEDICATIONS  Home Medications    **Home medications have not yet been reviewed for this encounter**       Audit from Redirected Encounters    **Home medications have not yet been reviewed for this encounter**         ALLERGIES  Allergies   Allergen Reactions    Ketamine Unspecified     aggressive       PHYSICAL EXAM  VITAL SIGNS: /63   Pulse 62   Temp 36.9 °C (98.4 °F) (Temporal)   Resp 15   Ht 1.829 m (6')   Wt 72.6 kg (160 lb)   SpO2 98%   BMI 21.70 kg/m²        Nursing notes and vitals reviewed.  Constitutional: Well developed, Well nourished, No acute distress, Non-toxic appearance.   Eyes: PERRLA, EOMI, Conjunctiva normal, No discharge.   Neck: Slight cervical spine tenderness, no step-off deformity  HENT: Normocephalic, Atraumatic, moist mucous membranes, no facial edema   Cardiovascular: Normal heart rate, Normal rhythm, No murmurs, No rubs, No gallops.   Thorax & Lungs: No respiratory distress, No rales, No rhonchi, No wheezing, No chest tenderness.   Abdomen: Bowel sounds normal, Soft, No tenderness, No guarding, No rebound, No masses, No pulsatile masses.   Skin: Warm, Dry, No erythema, No rash.   Musculoskeletal: Slight thoracic and lumbar spine tenderness no step-off deformity, patient splints bilateral lower extremities  Extremities: No deformity, no edema, good range of motion range of motion upper lower extremes bilaterally  Neurologic: Alert & oriented x 3, no focal abnormalities noted, acting appropriately on examination        EKG/LABS  Results for orders placed or performed during the hospital encounter of 02/10/25   CMP    Collection Time: 02/10/25  1:04 PM   Result Value Ref Range    Sodium 137 135 - 145 mmol/L     Potassium 4.3 3.6 - 5.5 mmol/L    Chloride 99 96 - 112 mmol/L    Co2 28 20 - 33 mmol/L    Anion Gap 10.0 7.0 - 16.0    Glucose 235 (H) 65 - 99 mg/dL    Bun 18 8 - 22 mg/dL    Creatinine 0.89 0.50 - 1.40 mg/dL    Calcium 8.9 8.5 - 10.5 mg/dL    Correct Calcium 9.1 8.5 - 10.5 mg/dL    AST(SGOT) 22 12 - 45 U/L    ALT(SGPT) 15 2 - 50 U/L    Alkaline Phosphatase 96 30 - 99 U/L    Total Bilirubin 0.3 0.1 - 1.5 mg/dL    Albumin 3.7 3.2 - 4.9 g/dL    Total Protein 6.7 6.0 - 8.2 g/dL    Globulin 3.0 1.9 - 3.5 g/dL    A-G Ratio 1.2 g/dL   CBC WITH DIFFERENTIAL    Collection Time: 02/10/25  1:04 PM   Result Value Ref Range    WBC 4.5 (L) 4.8 - 10.8 K/uL    RBC 4.24 (L) 4.70 - 6.10 M/uL    Hemoglobin 10.7 (L) 14.0 - 18.0 g/dL    Hematocrit 33.9 (L) 42.0 - 52.0 %    MCV 80.0 (L) 81.4 - 97.8 fL    MCH 25.2 (L) 27.0 - 33.0 pg    MCHC 31.6 (L) 32.3 - 36.5 g/dL    RDW 38.2 35.9 - 50.0 fL    Platelet Count 259 164 - 446 K/uL    MPV 9.6 9.0 - 12.9 fL    Neutrophils-Polys 75.50 (H) 44.00 - 72.00 %    Lymphocytes 16.70 (L) 22.00 - 41.00 %    Monocytes 5.80 0.00 - 13.40 %    Eosinophils 1.60 0.00 - 6.90 %    Basophils 0.20 0.00 - 1.80 %    Immature Granulocytes 0.20 0.00 - 0.90 %    Nucleated RBC 0.00 0.00 - 0.20 /100 WBC    Neutrophils (Absolute) 3.40 1.82 - 7.42 K/uL    Lymphs (Absolute) 0.75 (L) 1.00 - 4.80 K/uL    Monos (Absolute) 0.26 0.00 - 0.85 K/uL    Eos (Absolute) 0.07 0.00 - 0.51 K/uL    Baso (Absolute) 0.01 0.00 - 0.12 K/uL    Immature Granulocytes (abs) 0.01 0.00 - 0.11 K/uL    NRBC (Absolute) 0.00 K/uL   MAGNESIUM    Collection Time: 02/10/25  1:04 PM   Result Value Ref Range    Magnesium 1.8 1.5 - 2.5 mg/dL   ESTIMATED GFR    Collection Time: 02/10/25  1:04 PM   Result Value Ref Range    GFR (CKD-EPI) 98 >60 mL/min/1.73 m 2   POC BREATHALIZER    Collection Time: 02/10/25  1:40 PM   Result Value Ref Range    POC Breathalizer 0.000 0.00 - 0.01 Percent   EKG    Collection Time: 02/10/25  4:26 PM   Result Value Ref Range     Report       [facility] Emergency Dept.    Test Date:  2025-02-10  Pt Name:    BAILEE JACOBSON               Department: ER  MRN:        [id number]                      Room:        27  Gender:     Male                         Technician: [id number]  :        1965                   Requested By:ER TRIAGE PROTOCOL  Order #:    [id number]                    Reading MD: MERISSA TORRES DO    Measurements  Intervals                                Axis  Rate:       70                           P:          61  OK:         169                          QRS:        60  QRSD:       91                           T:          70  QT:         420  QTc:        454    Interpretive Statements  Sinus rhythm  Borderline low voltage, extremity leads  Compared to ECG 2025 12:50:26  No significant changes  Electronically Signed On 02- 16:26:09 PST by MERISSA TORRES DO         I have independently interpreted this EKG    RADIOLOGY/PROCEDURES   I have independently interpreted the diagnostic imaging associated with this visit and am waiting the final reading from the radiologist.   My preliminary interpretation is as follows:     Radiologist interpretation:  CT-LSPINE W/O PLUS RECONS   Final Result      1. No acute fracture or subluxation involving the lumbar spine.   2. Stable anterior wedge compression deformity of the superior endplate of T12.   3. Stable degenerative grade 1 anterolisthesis of L4 with respect to L3.   4. New postsurgical changes of posterior lateral interbody fusion at L4-5.   5. Horseshoe kidney.   6. Circumferential bladder wall thickening.      CT-TSPINE W/O PLUS RECONS   Final Result      Chronic appearing fractures without a definite acute fracture or change in alignment.      CT-CSPINE WITHOUT PLUS RECONS   Final Result      1. No acute osseous injury of the cervical spine.   2. Stable postsurgical changes of ACDF from C4 through C7.   3. Stable multilevel cervical  spondylosis.      CT-HEAD W/O   Final Result      1. Age-related central and cortical atrophy.   2. No acute intracranial abnormality.                   COURSE & MEDICAL DECISION MAKING    ASSESSMENT, COURSE AND PLAN  Care Narrative: This is a pleasant 59-year-old gentleman with ground-level fall.  Here the patient has no focal neurological deficits.  The patient's blood work shows no CV abnormality as this was completed secondary to his previous visit here with abnormality.  Patient slight elevated blood sugar but notes of acidosis.  CT scan of the head, cervical spine, T and L-spine are completed.  CTs are negative for acute abnormality such as fracture, intracranial hemorrhage.  Does have chronic fractures.  Patient has no new neurological deficits.  Patient will be discharged home with strict return precautions.  Reevaluation the patient, he states that he is wants to kill himself he has nothing to live for.  He states that he has a gun at home and he will shoot himself.  The patient was placed on a legal 2000 I discussed this with our psychiatric team for evaluation.  I did sign the patient out to my partner, Dr. Foote, for further evaluation and disposition.  In addition I discussed the patient with pharmacy to start his home medications.  Patient received 1 dose of Percocet for chronic pain and back pain after his fall.    ADDITIONAL PROBLEMS MANAGED  Hyperglycemia, patient is a diabetic, has his medications to be discharged.    DISPOSITION AND DISCUSSIONS    Decision tools and prescription drugs considered including, but not limited to: Considered MRI of the patient is no focal neurological deficits I do not believe he has a significant cord injury requiring MRI.    FINAL DIAGNOSIS  1. Fall, initial encounter    2. Pain, upper back    3. Lumbar back pain          DISPOSITION:  Patient will be discharged home in stable condition.    FOLLOW UP:  Orin Darnell, A.P.R.N.  [address] [address]  [address]  [phone]    Schedule an appointment as soon as possible for a visit   As needed    [facility], Emergency Dept  [address] [facility] [id number] [phone]    If symptoms worsen      OUTPATIENT MEDICATIONS:  New Prescriptions    No medications on file        Electronically signed by: Chai Perea D.O., 2/10/2025 1:41 PM

## 2025-02-11 ENCOUNTER — APPOINTMENT (OUTPATIENT)
Dept: MEDICAL GROUP | Facility: LAB | Age: 60
End: 2025-02-11
Payer: COMMERCIAL

## 2025-02-11 ENCOUNTER — APPOINTMENT (OUTPATIENT)
Dept: RADIOLOGY | Facility: MEDICAL CENTER | Age: 60
DRG: 696 | End: 2025-02-11
Attending: EMERGENCY MEDICINE
Payer: COMMERCIAL

## 2025-02-11 PROBLEM — R29.6 FREQUENT FALLS: Status: ACTIVE | Noted: 2025-02-11

## 2025-02-11 LAB
AMPHET UR QL SCN: NEGATIVE
ANION GAP SERPL CALC-SCNC: 8 MMOL/L (ref 7–16)
APPEARANCE UR: CLEAR
BACTERIA #/AREA URNS HPF: NORMAL /HPF
BARBITURATES UR QL SCN: NEGATIVE
BENZODIAZ UR QL SCN: NEGATIVE
BILIRUB UR QL STRIP.AUTO: NEGATIVE
BUN SERPL-MCNC: 27 MG/DL (ref 8–22)
BZE UR QL SCN: NEGATIVE
CALCIUM SERPL-MCNC: 8.5 MG/DL (ref 8.5–10.5)
CANNABINOIDS UR QL SCN: NEGATIVE
CASTS URNS QL MICRO: NORMAL /LPF (ref 0–2)
CHLORIDE SERPL-SCNC: 98 MMOL/L (ref 96–112)
CO2 SERPL-SCNC: 29 MMOL/L (ref 20–33)
COLOR UR: YELLOW
CREAT SERPL-MCNC: 0.91 MG/DL (ref 0.5–1.4)
CRP SERPL HS-MCNC: <0.3 MG/DL (ref 0–0.75)
EPITHELIAL CELLS 1715: NORMAL /HPF (ref 0–5)
ERYTHROCYTE [DISTWIDTH] IN BLOOD BY AUTOMATED COUNT: 38.7 FL (ref 35.9–50)
ERYTHROCYTE [SEDIMENTATION RATE] IN BLOOD BY WESTERGREN METHOD: 18 MM/HOUR (ref 0–20)
FENTANYL UR QL: NEGATIVE
GFR SERPLBLD CREATININE-BSD FMLA CKD-EPI: 96 ML/MIN/1.73 M 2
GLUCOSE BLD STRIP.AUTO-MCNC: 224 MG/DL (ref 65–99)
GLUCOSE BLD STRIP.AUTO-MCNC: 245 MG/DL (ref 65–99)
GLUCOSE BLD STRIP.AUTO-MCNC: 251 MG/DL (ref 65–99)
GLUCOSE BLD STRIP.AUTO-MCNC: 306 MG/DL (ref 65–99)
GLUCOSE BLD STRIP.AUTO-MCNC: 81 MG/DL (ref 65–99)
GLUCOSE BLD STRIP.AUTO-MCNC: 86 MG/DL (ref 65–99)
GLUCOSE SERPL-MCNC: 275 MG/DL (ref 65–99)
GLUCOSE UR STRIP.AUTO-MCNC: 500 MG/DL
HCT VFR BLD AUTO: 30.9 % (ref 42–52)
HGB BLD-MCNC: 9.9 G/DL (ref 14–18)
KETONES UR STRIP.AUTO-MCNC: ABNORMAL MG/DL
LEUKOCYTE ESTERASE UR QL STRIP.AUTO: NEGATIVE
MAGNESIUM SERPL-MCNC: 1.7 MG/DL (ref 1.5–2.5)
MCH RBC QN AUTO: 25.5 PG (ref 27–33)
MCHC RBC AUTO-ENTMCNC: 32 G/DL (ref 32.3–36.5)
MCV RBC AUTO: 79.6 FL (ref 81.4–97.8)
METHADONE UR QL SCN: NEGATIVE
MICRO URNS: ABNORMAL
NITRITE UR QL STRIP.AUTO: NEGATIVE
OPIATES UR QL SCN: NEGATIVE
OXYCODONE UR QL SCN: POSITIVE
PCP UR QL SCN: NEGATIVE
PH UR STRIP.AUTO: 6.5 [PH] (ref 5–8)
PHOSPHATE SERPL-MCNC: 4.1 MG/DL (ref 2.5–4.5)
PLATELET # BLD AUTO: 258 K/UL (ref 164–446)
PMV BLD AUTO: 9.3 FL (ref 9–12.9)
POTASSIUM SERPL-SCNC: 4.4 MMOL/L (ref 3.6–5.5)
PROCALCITONIN SERPL-MCNC: 0.1 NG/ML
PROPOXYPH UR QL SCN: NEGATIVE
PROT UR QL STRIP: 300 MG/DL
RBC # BLD AUTO: 3.88 M/UL (ref 4.7–6.1)
RBC # URNS HPF: NORMAL /HPF (ref 0–2)
RBC UR QL AUTO: NEGATIVE
SODIUM SERPL-SCNC: 135 MMOL/L (ref 135–145)
SP GR UR STRIP.AUTO: 1.02
UROBILINOGEN UR STRIP.AUTO-MCNC: 1 EU/DL
WBC # BLD AUTO: 5.2 K/UL (ref 4.8–10.8)
WBC #/AREA URNS HPF: NORMAL /HPF

## 2025-02-11 PROCEDURE — 72148 MRI LUMBAR SPINE W/O DYE: CPT

## 2025-02-11 PROCEDURE — 700111 HCHG RX REV CODE 636 W/ 250 OVERRIDE (IP): Mod: JZ | Performed by: INTERNAL MEDICINE

## 2025-02-11 PROCEDURE — 72146 MRI CHEST SPINE W/O DYE: CPT

## 2025-02-11 PROCEDURE — 96372 THER/PROPH/DIAG INJ SC/IM: CPT

## 2025-02-11 PROCEDURE — 700102 HCHG RX REV CODE 250 W/ 637 OVERRIDE(OP): Performed by: INTERNAL MEDICINE

## 2025-02-11 PROCEDURE — 86140 C-REACTIVE PROTEIN: CPT

## 2025-02-11 PROCEDURE — 51798 US URINE CAPACITY MEASURE: CPT

## 2025-02-11 PROCEDURE — 84153 ASSAY OF PSA TOTAL: CPT

## 2025-02-11 PROCEDURE — 84100 ASSAY OF PHOSPHORUS: CPT

## 2025-02-11 PROCEDURE — A9270 NON-COVERED ITEM OR SERVICE: HCPCS | Performed by: EMERGENCY MEDICINE

## 2025-02-11 PROCEDURE — 83735 ASSAY OF MAGNESIUM: CPT

## 2025-02-11 PROCEDURE — 700102 HCHG RX REV CODE 250 W/ 637 OVERRIDE(OP): Performed by: EMERGENCY MEDICINE

## 2025-02-11 PROCEDURE — A9270 NON-COVERED ITEM OR SERVICE: HCPCS

## 2025-02-11 PROCEDURE — 85027 COMPLETE CBC AUTOMATED: CPT

## 2025-02-11 PROCEDURE — 81001 URINALYSIS AUTO W/SCOPE: CPT

## 2025-02-11 PROCEDURE — 80048 BASIC METABOLIC PNL TOTAL CA: CPT

## 2025-02-11 PROCEDURE — A9270 NON-COVERED ITEM OR SERVICE: HCPCS | Performed by: INTERNAL MEDICINE

## 2025-02-11 PROCEDURE — 700105 HCHG RX REV CODE 258: Performed by: NURSE PRACTITIONER

## 2025-02-11 PROCEDURE — 90837 PSYTX W PT 60 MINUTES: CPT | Performed by: SOCIAL WORKER

## 2025-02-11 PROCEDURE — 85652 RBC SED RATE AUTOMATED: CPT

## 2025-02-11 PROCEDURE — 770001 HCHG ROOM/CARE - MED/SURG/GYN PRIV*

## 2025-02-11 PROCEDURE — 36415 COLL VENOUS BLD VENIPUNCTURE: CPT

## 2025-02-11 PROCEDURE — 84145 PROCALCITONIN (PCT): CPT

## 2025-02-11 PROCEDURE — 80307 DRUG TEST PRSMV CHEM ANLYZR: CPT

## 2025-02-11 PROCEDURE — 82962 GLUCOSE BLOOD TEST: CPT | Mod: 91

## 2025-02-11 PROCEDURE — 99223 1ST HOSP IP/OBS HIGH 75: CPT

## 2025-02-11 PROCEDURE — 700111 HCHG RX REV CODE 636 W/ 250 OVERRIDE (IP): Mod: JZ

## 2025-02-11 PROCEDURE — 700102 HCHG RX REV CODE 250 W/ 637 OVERRIDE(OP)

## 2025-02-11 RX ORDER — INSULIN LISPRO 100 [IU]/ML
2-9 INJECTION, SOLUTION INTRAVENOUS; SUBCUTANEOUS EVERY 6 HOURS
Status: DISCONTINUED | OUTPATIENT
Start: 2025-02-11 | End: 2025-02-11

## 2025-02-11 RX ORDER — AMOXICILLIN 250 MG
2 CAPSULE ORAL EVERY EVENING
Status: DISCONTINUED | OUTPATIENT
Start: 2025-02-11 | End: 2025-03-07 | Stop reason: HOSPADM

## 2025-02-11 RX ORDER — INSULIN LISPRO 100 [IU]/ML
3-14 INJECTION, SOLUTION INTRAVENOUS; SUBCUTANEOUS
Status: DISCONTINUED | OUTPATIENT
Start: 2025-02-11 | End: 2025-02-18

## 2025-02-11 RX ORDER — ONDANSETRON 2 MG/ML
4 INJECTION INTRAMUSCULAR; INTRAVENOUS EVERY 4 HOURS PRN
Status: DISCONTINUED | OUTPATIENT
Start: 2025-02-11 | End: 2025-03-07 | Stop reason: HOSPADM

## 2025-02-11 RX ORDER — TAMSULOSIN HYDROCHLORIDE 0.4 MG/1
0.4 CAPSULE ORAL
Status: DISCONTINUED | OUTPATIENT
Start: 2025-02-11 | End: 2025-02-11

## 2025-02-11 RX ORDER — TAMSULOSIN HYDROCHLORIDE 0.4 MG/1
0.4 CAPSULE ORAL
Status: DISCONTINUED | OUTPATIENT
Start: 2025-02-11 | End: 2025-02-16

## 2025-02-11 RX ORDER — GABAPENTIN 100 MG/1
200 CAPSULE ORAL 3 TIMES DAILY
Status: DISCONTINUED | OUTPATIENT
Start: 2025-02-11 | End: 2025-02-18

## 2025-02-11 RX ORDER — PIOGLITAZONE 15 MG/1
15 TABLET ORAL DAILY
Status: DISCONTINUED | OUTPATIENT
Start: 2025-02-11 | End: 2025-02-11

## 2025-02-11 RX ORDER — DEXTROSE MONOHYDRATE 25 G/50ML
25 INJECTION, SOLUTION INTRAVENOUS
Status: DISCONTINUED | OUTPATIENT
Start: 2025-02-11 | End: 2025-03-07 | Stop reason: HOSPADM

## 2025-02-11 RX ORDER — ACETAMINOPHEN 325 MG/1
650 TABLET ORAL EVERY 6 HOURS PRN
Status: DISCONTINUED | OUTPATIENT
Start: 2025-02-11 | End: 2025-02-11

## 2025-02-11 RX ORDER — ENOXAPARIN SODIUM 100 MG/ML
40 INJECTION SUBCUTANEOUS DAILY
Status: DISCONTINUED | OUTPATIENT
Start: 2025-02-11 | End: 2025-03-07 | Stop reason: HOSPADM

## 2025-02-11 RX ORDER — LANOLIN ALCOHOL/MO/W.PET/CERES
400 CREAM (GRAM) TOPICAL 2 TIMES DAILY
Status: COMPLETED | OUTPATIENT
Start: 2025-02-11 | End: 2025-02-12

## 2025-02-11 RX ORDER — CELECOXIB 200 MG/1
200 CAPSULE ORAL 2 TIMES DAILY
Status: DISCONTINUED | OUTPATIENT
Start: 2025-02-11 | End: 2025-02-12

## 2025-02-11 RX ORDER — DEXTROSE MONOHYDRATE 25 G/50ML
25 INJECTION, SOLUTION INTRAVENOUS
Status: DISCONTINUED | OUTPATIENT
Start: 2025-02-11 | End: 2025-02-11

## 2025-02-11 RX ORDER — ACETAMINOPHEN 500 MG
1000 TABLET ORAL 3 TIMES DAILY PRN
Status: DISCONTINUED | OUTPATIENT
Start: 2025-02-16 | End: 2025-02-13

## 2025-02-11 RX ORDER — INSULIN LISPRO 100 [IU]/ML
1-6 INJECTION, SOLUTION INTRAVENOUS; SUBCUTANEOUS
Status: DISCONTINUED | OUTPATIENT
Start: 2025-02-11 | End: 2025-02-11

## 2025-02-11 RX ORDER — INSULIN LISPRO 100 [IU]/ML
2-9 INJECTION, SOLUTION INTRAVENOUS; SUBCUTANEOUS
Status: DISCONTINUED | OUTPATIENT
Start: 2025-02-11 | End: 2025-02-11

## 2025-02-11 RX ORDER — VENLAFAXINE 37.5 MG/1
75 TABLET ORAL DAILY
Status: DISCONTINUED | OUTPATIENT
Start: 2025-02-11 | End: 2025-02-13

## 2025-02-11 RX ORDER — CELECOXIB 200 MG/1
200 CAPSULE ORAL 2 TIMES DAILY PRN
Status: DISCONTINUED | OUTPATIENT
Start: 2025-02-16 | End: 2025-02-12

## 2025-02-11 RX ORDER — POLYETHYLENE GLYCOL 3350 17 G/17G
1 POWDER, FOR SOLUTION ORAL
Status: DISCONTINUED | OUTPATIENT
Start: 2025-02-11 | End: 2025-03-07 | Stop reason: HOSPADM

## 2025-02-11 RX ORDER — OMEPRAZOLE 20 MG/1
40 CAPSULE, DELAYED RELEASE ORAL DAILY
Status: DISCONTINUED | OUTPATIENT
Start: 2025-02-11 | End: 2025-03-07 | Stop reason: HOSPADM

## 2025-02-11 RX ORDER — ACETAMINOPHEN 500 MG
1000 TABLET ORAL 3 TIMES DAILY
Status: DISCONTINUED | OUTPATIENT
Start: 2025-02-11 | End: 2025-02-13

## 2025-02-11 RX ORDER — PRAZOSIN HYDROCHLORIDE 2 MG/1
2 CAPSULE ORAL EVERY EVENING
Status: DISCONTINUED | OUTPATIENT
Start: 2025-02-11 | End: 2025-02-11

## 2025-02-11 RX ORDER — SODIUM CHLORIDE 9 MG/ML
1000 INJECTION, SOLUTION INTRAVENOUS ONCE
Status: COMPLETED | OUTPATIENT
Start: 2025-02-11 | End: 2025-02-11

## 2025-02-11 RX ADMIN — ACETAMINOPHEN 1000 MG: 500 TABLET ORAL at 09:48

## 2025-02-11 RX ADMIN — SODIUM CHLORIDE 1000 ML: 9 INJECTION, SOLUTION INTRAVENOUS at 19:53

## 2025-02-11 RX ADMIN — ACETAMINOPHEN 1000 MG: 500 TABLET ORAL at 21:00

## 2025-02-11 RX ADMIN — INSULIN LISPRO 6 UNITS: 100 INJECTION, SOLUTION INTRAVENOUS; SUBCUTANEOUS at 06:17

## 2025-02-11 RX ADMIN — ACETAMINOPHEN 1000 MG: 500 TABLET ORAL at 16:22

## 2025-02-11 RX ADMIN — GABAPENTIN 200 MG: 100 CAPSULE ORAL at 17:31

## 2025-02-11 RX ADMIN — ENOXAPARIN SODIUM 40 MG: 100 INJECTION SUBCUTANEOUS at 17:31

## 2025-02-11 RX ADMIN — INSULIN LISPRO 4 UNITS: 100 INJECTION, SOLUTION INTRAVENOUS; SUBCUTANEOUS at 17:49

## 2025-02-11 RX ADMIN — GABAPENTIN 200 MG: 100 CAPSULE ORAL at 12:55

## 2025-02-11 RX ADMIN — Medication 400 MG: at 17:31

## 2025-02-11 RX ADMIN — CELECOXIB 200 MG: 200 CAPSULE ORAL at 16:22

## 2025-02-11 RX ADMIN — OMEPRAZOLE 40 MG: 20 CAPSULE, DELAYED RELEASE ORAL at 06:03

## 2025-02-11 RX ADMIN — INSULIN LISPRO 3 UNITS: 100 INJECTION, SOLUTION INTRAVENOUS; SUBCUTANEOUS at 13:01

## 2025-02-11 RX ADMIN — OXYCODONE 5 MG: 5 TABLET ORAL at 16:21

## 2025-02-11 RX ADMIN — ONDANSETRON 4 MG: 2 INJECTION INTRAMUSCULAR; INTRAVENOUS at 09:48

## 2025-02-11 RX ADMIN — TAMSULOSIN HYDROCHLORIDE 0.4 MG: 0.4 CAPSULE ORAL at 16:52

## 2025-02-11 RX ADMIN — SITAGLIPTIN 100 MG: 100 TABLET, FILM COATED ORAL at 06:03

## 2025-02-11 RX ADMIN — INSULIN GLARGINE-YFGN 15 UNITS: 100 INJECTION, SOLUTION SUBCUTANEOUS at 17:48

## 2025-02-11 RX ADMIN — GABAPENTIN 200 MG: 100 CAPSULE ORAL at 06:02

## 2025-02-11 RX ADMIN — PIOGLITAZONE 15 MG: 15 TABLET ORAL at 06:02

## 2025-02-11 RX ADMIN — OXYCODONE 5 MG: 5 TABLET ORAL at 09:48

## 2025-02-11 RX ADMIN — CELECOXIB 200 MG: 200 CAPSULE ORAL at 10:20

## 2025-02-11 RX ADMIN — VENLAFAXINE HYDROCHLORIDE 75 MG: 75 TABLET ORAL at 06:02

## 2025-02-11 RX ADMIN — SENNOSIDES AND DOCUSATE SODIUM 2 TABLET: 50; 8.6 TABLET ORAL at 17:31

## 2025-02-11 ASSESSMENT — COGNITIVE AND FUNCTIONAL STATUS - GENERAL
SUGGESTED CMS G CODE MODIFIER MOBILITY: CL
DAILY ACTIVITIY SCORE: 14
TOILETING: A LITTLE
EATING MEALS: A LOT
WALKING IN HOSPITAL ROOM: A LOT
CLIMB 3 TO 5 STEPS WITH RAILING: A LOT
PERSONAL GROOMING: A LOT
MOVING FROM LYING ON BACK TO SITTING ON SIDE OF FLAT BED: A LITTLE
STANDING UP FROM CHAIR USING ARMS: A LOT
HELP NEEDED FOR BATHING: A LOT
DRESSING REGULAR UPPER BODY CLOTHING: A LOT
MOVING TO AND FROM BED TO CHAIR: A LOT
MOBILITY SCORE: 14
TURNING FROM BACK TO SIDE WHILE IN FLAT BAD: A LITTLE
DRESSING REGULAR LOWER BODY CLOTHING: A LITTLE
SUGGESTED CMS G CODE MODIFIER DAILY ACTIVITY: CK

## 2025-02-11 ASSESSMENT — ENCOUNTER SYMPTOMS
DIZZINESS: 0
NERVOUS/ANXIOUS: 1
FEVER: 0
SEIZURES: 0
FALLS: 1
HEADACHES: 0
BACK PAIN: 1
PALPITATIONS: 0
CHILLS: 0
DIARRHEA: 1
SHORTNESS OF BREATH: 0
COUGH: 0
VOMITING: 0
SENSORY CHANGE: 0
ABDOMINAL PAIN: 0
NAUSEA: 0
WHEEZING: 0
MYALGIAS: 0
DEPRESSION: 1
CONSTIPATION: 0
WEAKNESS: 1
LOSS OF CONSCIOUSNESS: 0
MYALGIAS: 1
IRRITABILITY: 1
FOCAL WEAKNESS: 1

## 2025-02-11 ASSESSMENT — PAIN DESCRIPTION - PAIN TYPE
TYPE: ACUTE PAIN;CHRONIC PAIN
TYPE: ACUTE PAIN
TYPE: ACUTE PAIN

## 2025-02-11 ASSESSMENT — PATIENT HEALTH QUESTIONNAIRE - PHQ9: CLINICAL INTERPRETATION OF PHQ2 SCORE: 6

## 2025-02-11 NOTE — ASSESSMENT & PLAN NOTE
Patient with frequent falls due to chronic and progressively worsening bilateral lower extremity weakness with multiple different spinal surgeries.  Patient does report chronic bowel incontinence and recently has developed urinary retention after previous fall with concern for potential cord compression/abnormality.  Lower extremity weakness 4+ out of 5, likely generalized, however asked by ER provider to admit for MRI evaluation  -MRI of thoracic and lumbar spine show chronic compression fracture at T12, degenerative changes.  No acute fracture  Neurosurgery has recommended for outpatient follow-up and possible epidural injections  - PT/OT recommend post-acute, not a candidate due to legal hold for SI

## 2025-02-11 NOTE — CONSULTS
"[facility]   TRIAGE ASSESSMENT    Name: Christoph Taylor  MRN: [id number]  : 1965  Age: 59 y.o.  Date of assessment: 2/10/2025  PCP: YESY Tariq.  Persons in attendance: Patient  Patient Location: [facility]    CHIEF COMPLAINT/PRESENTING ISSUE (as stated by patient):     Patient is a 59-year-old male BIB EMS.  EMS reported pt was supposed to go to SNF from [facility] but \"something with insurance\" and pt was unable to get accepted. Per report, pt recently here for T12 fracture and today had a mechanical GLF at home landing onto R side. Pt reports R side leg/hip and back pain. EMS reports no LOC, + head strike but not obvious trauma to his head. Pt also endorses SI x \"a couple of months\", has considered shooting himself \"but I have not been able to pull the trigger\". Pt reports he does have firearms at home. He does endorse that he has attempted to shoot self with his firearm but unable to follow through. When writer interviewed patient he stated \"I don't want to be here anymore\" and endorsed plan and intent by firearm. He denied HI, AH or VH.     Patient appears to have physically decompensated and unable to adequately care for himself in his current home environment. Patient lives alone and is unable to walk. He uses a wheel chair in the home and wears a hard brace on his left leg due to collapsed arches. He has wounds on his knees from needing to crawl inside his home recently because he could not ambulate up the stairs. Patient disheveled and malodorous. Patient placed on L2K by ERP due to significant SI with intent and plan.     Patient states that he is unable to take care of himself and has a hard time administering insulin, buying food for himself, taking his blood sugars. Additionally says that he has worsening neuropathy of the feet and hands. His pain is worse than ever. He has no additional help here. Patient was recently hospitalized from 2025-2025 " due to increasing weakness, decreased dexterity with upper extremities, thoracic back pain, and prior back surgeries. He was seen by psychiatry and psychology while admitted and apparently discharged to home.     Chief Complaint   Patient presents with    T-5000 FALL    Back Pain        CURRENT LIVING SITUATION/SOCIAL SUPPORT/FINANCIAL RESOURCES: Patient lives independently and is unable to care for self. He requires a wheelchair to get around his home and he has difficulty ambulating in and out of his home. He reports having no social support/friends/family. He is retired and has [employer].     BEHAVIORAL HEALTH/SUBSTANCE USE TREATMENT HISTORY  Does patient/parent report a history of prior behavioral health/substance use treatment for patient?   No:patient reports no history of hospitalization or psychiatric workup aside from previous medical hospitalizations.     SAFETY ASSESSMENT - SELF  Does patient acknowledge current or past symptoms of dangerousness to self or is previous history noted? yes  Does parent/significant other report patient has current or past symptoms of dangerousness to self? N\A  Does presenting problem suggest symptoms of dangerousness to self? Yes:     Past Current    Suicidal Thoughts: [x]  [x]    Suicidal Plans: [x]  [x]    Suicidal Intent: [x]  [x]    Suicide Attempts: [x]  []    Self-Injury []  []      For any boxes checked above, provide detail: Patient has active plan to shoot himself with his firearm. He has attempted in the past but was unable to pull the trigger.     History of suicide by family member: no  History of suicide by friend/significant other: no  Recent change in frequency/specificity/intensity of suicidal thoughts or self-harm behavior? yes - patient reports increased frequency for the past several months.   Current access to firearms, medications, or other identified means of suicide/self-harm? yes - firearms  If yes, willing to restrict access to means of  "suicide/self-harm? no  Protective factors present:   Unable to identify any protective factors for patient at this time     SAFETY ASSESSMENT - OTHERS  Does patient acknowledge current or past symptoms of aggressive behavior or risk to others or is previous history noted? no  Does parent/significant other report patient has current or past symptoms of aggressive behavior or risk to others?  N\A  Does presenting problem suggest symptoms of dangerousness to others? No    LEGAL HISTORY  Does patient acknowledge history of arrest/alf/FCI or is previous history noted? no    Crisis Safety Plan completed and copy given to patient? N\A    ABUSE/NEGLECT SCREENING  Does patient report feeling “unsafe” in his/her home, or afraid of anyone?  no  Does patient report any history of physical, sexual, or emotional abuse?  no  Does parent or significant other report any of the above? N\A  Is there evidence of neglect by self?  yes  Is there evidence of neglect by a caregiver? no  Does the patient/parent report any history of CPS/APS/police involvement related to suspected abuse/neglect or domestic violence? no  Based on the information provided during the current assessment, is a mandated report of suspected abuse/neglect being made?  No    SUBSTANCE USE SCREENING  Yes:  Merlin all substances used in the past 30 days:      Last Use Amount   []   Alcohol     []   Marijuana     []   Heroin     []   Prescription Opioids  (used without prescription, for    recreation, or in excess of prescribed amount)     []   Other Prescription  (used without prescription, for    recreation, or in excess of prescribed amount)     []   Cocaine      []   Methamphetamine     []   \"\" drugs (ectasy, MDMA)     []   Other substances        UDS results: Not collected  Breathalyzer results: 0.00    What consequences does the patient associate with any of the above substance use and or addictive behaviors? None    Risk factors for detox (check all " that apply):  []  Seizures   []  Diaphoretic (sweating)   []  Tremors   []  Hallucinations   []  Increased blood pressure   []  Decreased blood pressure   []  Other   []  None      [] Patient education on risk factors for detoxification and instructed to return to ER as needed.      MENTAL STATUS   Participation: Active verbal participation  Grooming: Disheveled and malodorous  Orientation: Fully Oriented  Behavior: Calm  Eye contact: Limited  Mood: Depressed  Affect: Flat and Sad  Thought process: Logical  Thought content: Within normal limits  Speech: Rate within normal limits  Perception: Within normal limits  Memory:  No gross evidence of memory deficits  Insight: Limited  Judgment:  Limited  Other:    Collateral information:   Source:  [] Significant other present in person:   [] Significant other by telephone  [] [facility]   [x] [facility]  [x] [facility] Medical Record  [] Other:     [] Unable to complete full assessment due to:  [] Acute intoxication  [] Patient declined to participate/engage  [] Patient verbally unresponsive  [] Significant cognitive deficits  [] Significant perceptual distortions or behavioral disorganization  [] Other:      CLINICAL IMPRESSIONS:  Primary:  Major Depressive Disorder, Recurrent  Secondary:  Suicidal Ideation       IDENTIFIED NEEDS/PLAN:  [Trigger DISPOSITION list for any items marked]    [x]  Imminent safety risk - self [] Imminent safety risk - others   []  Acute substance withdrawal []  Psychosis/Impaired reality testing   [x]  Mood/anxiety []  Substance use/Addictive behavior   []  Maladaptive behaviro []  Parent/child conflict   []  Family/Couples conflict [x]  Biomedical   []  Housing []  Financial   []   Legal  Occupational/Educational   []  Domestic violence []  Other:     Recommended Plan of Care:  Actively being addressed by Legal [facility] and [facility] Emergency Department, Refer to [facility], [facility], [facility]  [facility], 1:1 Observation, and No phone, No personal belongings, No visitors.     Patient would benefit from social work follow up due to medical complexity and unable to care for self, however patient is an imminent risk to self at this time.   *Telesitter may not be utilized for moderate or high risk patients    Has the Recommended Plan of Care/Level of Observation been reviewed with the patient's assigned nurse? yes    Does patient/parent or guardian express agreement with the above plan? yes    Referral appointment(s) scheduled? N\A    Alert team only:   I have discussed findings and recommendations with Dr. Foote who is in agreement with these recommendations.     Referral information sent to the following outpatient community providers :    Referral information sent to the following inpatient community providers : [facility], [facility], [facility], [facility]     If applicable : Referred  to  Alert Team for legal hold follow up at (time): 2/10/2025 1610      DEBI ShellSGIO  2/10/2025

## 2025-02-11 NOTE — ED NOTES
Report given to RN Perez at bedside. Pt a&o, resps even and unlabored. No complaint at this time. Room secure. Sitter monitoring 1:1 for safety.

## 2025-02-11 NOTE — DISCHARGE INSTRUCTIONS
No heavy lifting or bending for 3 days.  Return to [facility] emergency department for fever, leg weakness (inability to walk or bear weight), loss of bowel or bladder control, loss of feeling between your legs or any new symptoms.      Back Pain (Lumbosacral Strain)  Back pain is one of the most common causes of pain. There are many causes of back pain. Most are not serious conditions.    CAUSES  Your backbone (spinal column) is made up of 24 main vertebral bodies, the sacrum, and the coccyx. These are held together by muscles and tough, fibrous tissue (ligaments). Nerve roots pass through the openings between the vertebrae. A sudden move or injury to the back may cause injury to, or pressure on, these nerves. This may result in localized back pain or pain movement (radiation) into the buttocks, down the leg, and into the foot. Sharp, shooting pain from the buttock down the back of the leg (sciatica) is frequently associated with a ruptured (herniated) disc. Pain may be caused by muscle spasm alone.  Your caregiver can often find the cause of your pain by the details of your symptoms and an exam. In some cases, you may need tests (such as X-rays). Your caregiver will work with you to decide if any tests are needed based on your specific exam.  HOME CARE INSTRUCTIONS  Avoid an underactive lifestyle. Active exercise, as directed by your caregiver, is your greatest weapon against back pain.   Avoid hard physical activities (tennis, racquetball, water-skiing) if you are not in proper physical condition for it. This may aggravate and/or create problems.   If you have a back problem, avoid sports requiring sudden body movements. Swimming and walking are generally safer activities.   Maintain good posture.   Avoid becoming overweight (obese).   Use bed rest for only the most extreme, sudden (acute) episode. Your caregiver will help you determine how much bed rest is necessary.   For acute conditions,  you may put ice on the injured area.   Put ice in a plastic bag.   Place a towel between your skin and the bag.   Leave the ice on for 30 minutes at a time, every 2 hours, or as needed.   After you are improved and more active, it may help to apply heat for 30 minutes before activities.   See your caregiver if you are having pain that lasts longer than expected. Your caregiver can advise appropriate exercises and/or therapy if needed. With conditioning, most back problems can be avoided.  SEEK IMMEDIATE MEDICAL CARE IF:  You have numbness, tingling, weakness, or problems with the use of your arms or legs.   You experience severe back pain not relieved with medicines.   There is a change in bowel or bladder control.   You have increasing pain in any area of the body, including your belly (abdomen).   You notice shortness of breath, dizziness, or feel faint.   You feel sick to your stomach (nauseous), are throwing up (vomiting), or become sweaty.   You notice discoloration of your toes or legs, or your feet get very cold.   Your back pain is getting worse.   You have an oral temperature above 102° F (38.9° C), not controlled by medicine.   MAKE SURE YOU:   Understand these instructions.   Will watch your condition.   Will get help right away if you are not doing well or get worse.   Document Released: 03/14/2011   [employer]Care® Patient Information ©2011 [employer].  Back Exercises  Back exercises help treat and prevent back injuries. The goal of back exercises is to increase the strength of your abdominal and back muscles and the flexibility of your back. These exercises should be started when you no longer have back pain. Back exercises include:  Pelvic Tilt - Lie on your back with your knees bent. Tilt your pelvis until the lower part of your back is against the floor. Hold this position 5-10 sec and repeat 5-10 times.   Knee to Chest - Pull first one knee up against your chest and hold for 20-30 seconds, repeat this  with the other knee, and then both knees. This may be done with the other leg straight or bent, whichever feels better.   Sit-Ups or Curl-Ups - Bend your knees 90 degrees. Start with tilting your pelvis, and do a partial, slow sit-up, lifting your trunk only 30-45 degrees off the floor. Take at least 2-3 sec for each sit-up. Do not do sit-ups with your knees out straight. If partial sit-ups are difficult, simply do the above but with only tightening your abdominal muscles and holding it as directed.   Hip-Lift - Lie on your back with your knees flexed 90 degrees. Push down with your feet and shoulders as you raise your hips a couple inches off the floor; hold for 10 sec, repeat 5-10 times.   Back arches - Lie on your stomach, propping yourself up on bent elbows. Slowly press on your hands, causing an arch in your low back. Repeat 3-5 times. Any initial stiffness and discomfort should lessen with repetition over time.   Shoulder-Lifts - Lie face down with arms beside your body. Keep hips and torso pressed to floor as you slowly lift your head and shoulders off the floor.   Do not overdo your exercises, especially in the beginning. Exercises may cause you some mild back discomfort which lasts for a few minutes; however, if the pain is more severe, or lasts for more than 15 minutes, do not continue exercises until you see your caregiver. Improvement with exercise therapy for back problems is slow.   See your caregivers for assistance with developing a proper back exercise program.  Document Released: 01/25/2006 Document Re-Released: 03/16/2010  [employer]® Patient Information ©2011 [employer].

## 2025-02-11 NOTE — PROGRESS NOTES
Hospital Medicine Daily Progress Note    Date of Service  2/11/2025    Chief Complaint  Christoph Taylor is a 59 y.o. male admitted 2/10/2025 with   Chief Complaint   Patient presents with    T-5000 FALL    Back Pain         Hospital Course  No notes on file    Interval Problem Update  Patient was seen and examined at bedside.  I have personally reviewed and interpreted vitals, labs, and imaging.    2/11.  Afebrile intermittent hypotension.  On 1-2 L nasal cannula.  Denies fevers, chills.  Patient states he has a broken syndrome from prior chest compressions and he gets chest pain whenever he takes a deep breath.  Reports chronic back pain which is worse since he fell yesterday.  Reports neuropathy in his hands, decreased dexterity since prior laminectomy, decreased vision making it very hard to use lancets, test strips to check his blood sugars at home.  Reports being compliant with oral blood sugar meds but does not know the names.  Increase insulin sliding scale.  Continue legal hold.  MRI of thoracic and lumbar showed no acute fracture.  This was reviewed with patient.  Wbc 4.5 > 5.2  Hgb 10.7 > 9.9  1/10 A1c 9.8  Procal 0.1    I have discussed this patient's plan of care and discharge plan at IDT rounds today with Case Management, Nursing, Nursing leadership, and other members of the IDT team.    Consultants/Specialty  physiatry    Code Status  Prior    Disposition  The patient is not medically cleared for discharge to home or a post-acute facility.  Anticipate discharge to: a psychiatric hospital    I have placed the appropriate orders for post-discharge needs.    Review of Systems  Review of Systems   Cardiovascular:  Positive for chest pain.   Genitourinary:         Urinary retention   Musculoskeletal:  Positive for back pain, falls and myalgias.   Psychiatric/Behavioral:  Positive for depression and suicidal ideas. The patient is nervous/anxious.         Physical Exam  Temp:  [36.9 °C (98.4 °F)] 36.9 °C  (98.4 °F)  Pulse:  [60-79] 60  Resp:  [12-19] 19  BP: ()/(54-71) 104/71  SpO2:  [76 %-100 %] 98 %    Physical Exam  Vitals and nursing note reviewed.   Constitutional:       Appearance: Normal appearance. He is ill-appearing.   HENT:      Head: Normocephalic and atraumatic.      Nose: Nose normal.      Mouth/Throat:      Mouth: Mucous membranes are moist.      Pharynx: Oropharynx is clear.   Eyes:      Extraocular Movements: Extraocular movements intact.      Conjunctiva/sclera: Conjunctivae normal.   Cardiovascular:      Rate and Rhythm: Normal rate and regular rhythm.      Pulses: Normal pulses.      Heart sounds: Normal heart sounds. No murmur heard.     No friction rub. No gallop.   Pulmonary:      Effort: Pulmonary effort is normal. No respiratory distress.      Breath sounds: Normal breath sounds. No wheezing or rales.   Chest:      Chest wall: No tenderness.   Abdominal:      General: Abdomen is flat. Bowel sounds are normal. There is no distension.      Palpations: Abdomen is soft. There is no mass.      Tenderness: There is no abdominal tenderness. There is no guarding.   Musculoskeletal:         General: Normal range of motion.      Cervical back: Normal range of motion and neck supple.   Skin:     General: Skin is warm.      Capillary Refill: Capillary refill takes less than 2 seconds.   Neurological:      General: No focal deficit present.      Mental Status: He is alert and oriented to person, place, and time. Mental status is at baseline.      Cranial Nerves: No cranial nerve deficit.      Motor: No weakness.   Psychiatric:         Mood and Affect: Mood is anxious and depressed.         Behavior: Behavior normal.         Fluids  No intake or output data in the 24 hours ending 02/11/25 0703     Laboratory  Recent Labs     02/10/25  1304   WBC 4.5*   RBC 4.24*   HEMOGLOBIN 10.7*   HEMATOCRIT 33.9*   MCV 80.0*   MCH 25.2*   MCHC 31.6*   RDW 38.2   PLATELETCT 259   MPV 9.6     Recent Labs      02/10/25  1304   SODIUM 137   POTASSIUM 4.3   CHLORIDE 99   CO2 28   GLUCOSE 235*   BUN 18   CREATININE 0.89   CALCIUM 8.9                   Imaging  MR-THORACIC SPINE-W/O   Final Result      1.  Moderate chronic compression deformity at T12.   2.  No acute fracture.   3.  Multifocal degenerative disease as described above.   4.  There has been no significant interval change.      MR-LUMBAR SPINE-W/O   Final Result      1.  Postsurgical and degenerative changes as described above.   2.  Moderate chronic compression deformity at T12.   3.  No acute abnormality.   4.  There has been no significant interval change.      CT-LSPINE W/O PLUS RECONS   Final Result      1. No acute fracture or subluxation involving the lumbar spine.   2. Stable anterior wedge compression deformity of the superior endplate of T12.   3. Stable degenerative grade 1 anterolisthesis of L4 with respect to L3.   4. New postsurgical changes of posterior lateral interbody fusion at L4-5.   5. Horseshoe kidney.   6. Circumferential bladder wall thickening.      CT-TSPINE W/O PLUS RECONS   Final Result      Chronic appearing fractures without a definite acute fracture or change in alignment.      CT-CSPINE WITHOUT PLUS RECONS   Final Result      1. No acute osseous injury of the cervical spine.   2. Stable postsurgical changes of ACDF from C4 through C7.   3. Stable multilevel cervical spondylosis.      CT-HEAD W/O   Final Result      1. Age-related central and cortical atrophy.   2. No acute intracranial abnormality.                    Assessment/Plan  * Urinary retention- (present on admission)  Assessment & Plan  Patient with acute urinary retention after fall, given extensive back history concern for potential cord abnormality.  Patient had reaction to Flomax in the past, will try prazosin  - Patient with profuse lightheadedness after Flomax in the past  - Will try prazosin  - Ordered PSA  - Will need repeat postvoid bladder scan    Frequent  falls  Assessment & Plan  Patient with frequent falls due to chronic and progressively worsening bilateral lower extremity weakness with multiple different spinal surgeries.  Patient does report chronic bowel incontinence and recently has developed urinary retention after previous fall with concern for potential cord compression/abnormality.  Lower extremity weakness 4+ out of 5, likely generalized, however asked by ER provider to admit for MRI evaluation  -Pending MRI of thoracic and lumbar spine for further evaluation  - PT/OT in a.m.    Suicidal ideation- (present on admission)  Assessment & Plan  Currently on legal hold for suicidal ideation due to his current health  - Will need inpatient psychiatry consult in a.m.    Acute hypoxic respiratory failure (HCC)- (present on admission)  Assessment & Plan  Patient requiring 1 to 2 L nasal cannula emergency department, anticipate likely hypoventilation induced versus nocturnal. No pulmonary symptoms to suggest infection at this time  - Continue to monitor  - Incentive spirometer    Uncontrolled type 2 diabetes mellitus with hyperglycemia (HCC)- (present on admission)  Assessment & Plan  - Ordered hemoglobin A1c  - Insulin sliding scale  -Will provide basal glargine to 15 units daily, will need titration as needed  - Holding home glycemic medications  - Titrate glucose between 140 and 180           VTE prophylaxis: Lovenox    I have performed a physical exam and reviewed and updated ROS and Plan today (2/11/2025). In review of yesterday's note (2/10/2025), there are no changes except as documented above.

## 2025-02-11 NOTE — DISCHARGE PLANNING
[facility] ED Behavioral Health Fax Referral      Referral: Legal Hold    Intervention: Patient referral to community inpatient  facillity    Legal Hold Initiated: Date: 2/10/2025 Time: 1610    Patient’s Insurance Listed on Face Sheet: [employer]    Referrals sent to:  [facility], [facility], [facility] , [facility]    Referrals faxed by Yanet Hernandez LCSW    This referral contains the following information:  Face sheet __x__  Page 1 and Page 2 of Legal Hold __x__  Alert Team Assessment/Psych Assessment __x__  Head to toe physical exam __x__  Urine Drug Screen __x__  Blood Alcohol __x__  Vital signs __x__  Pregnancy test when applicable __  Medications list __x__  Covid screening ____    Plan: Patient will transfer to mental health facility once acceptance is obtained    For all referral information, please contact the  referral office daily between the hours of 7:00 a.m. to 6:00 p.m. at:   Phone [phone]   Fax [phone]    ED  Alert Team   Phone [phone] Fax [phone]    [facility] Emergency Department Contact numbers:  Green Pod [phone]   Blue Pod [phone]   Red Pod [phone]   Pediatrics [phone]

## 2025-02-11 NOTE — DISCHARGE PLANNING
Case Management Discharge Planning    Admission Date: 2/10/2025  GMLOS: 3.6  ALOS: 0    6-Clicks ADL Score:    6-Clicks Mobility Score:        Anticipated Discharge Dispo: Discharge Disposition: D/T to home under [facility]HA care in anticipation of covered skilled care (06)    DME Needed: No    Action(s) Taken: DC Assessment Complete (See below) and Referral(s) sent    Chart review completed. Patient discussed in IDT rounds. RNCM sent referral to [facility] SNFs per protocol.     Per IDT, patient with numerous admissions within last few months; currently on legal hold for SI    RNCM notified Alert Team PAR via Voalte re: legal hold status    Per IDT, [facility] is deferring accepting patient (patient previously a complex discharge) until discussion is had with patient's relative re: paying outstanding balances for personal CGs    Patient previously discharged to home residence with HH ([facility]CG    Escalations Completed: None    Medically Clear: No    Next Steps: CM to follow up with IDT regarding DCP needs/barriers    Barriers to Discharge: Medical clearance    Is the patient up for discharge tomorrow: No      Care Transition Team Assessment    Case management role discussed; understanding of case management role verbalized   Demographics on facesheet verified  Please see H&P for pertinent PMH and reason for admission  Patient's PCP is: YESENIA Cervantes  Patient's preferred pharmacy is: [facility]  Housing: Patient lives in a mobile home alone; [address]  IADLS/ADLS: Needs assist with most IADLS/ADLS  Mental Health Concerns: SI; on legal hold  Substance Abuse: Denies  Monthly Income/employment status: $2400/month; patient is not retired  DME: FWW/WC  O2: None at baseline  Prior services: HH ([facility])/CGs  Discharge support: Friends, neighbors  Transportation: Will need assist  Discharge planning: Home with HH      Information Source  Orientation Level: Oriented X4  Who is responsible for making decisions for patient? :  "Patient    Readmission Evaluation  Is this a readmission?: Yes - unplanned readmission  Why do you think you were readmitted?: \"fell at home\"  Was an appointment arranged for you prior to discharge?: Yes, did not attend appointment  Were there new prescriptions you were supposed to fill after you were discharged?: Yes, prescriptions filled  Did you understand your discharge instructions?: Yes  Did you have enough support after your last discharge?: No    Elopement Risk  Legal Hold: Elopement Risk  Ambulatory or Self Mobile in Wheelchair: Yes  Disoriented: No  Psychiatric Symptoms: None  History of Wandering: No  Elopement this Admit: No  Vocalizing Wanting to Leave: No  Displays Behaviors, Body Language Wanting to Leave: No-Not at Risk for Elopement  Time of Legal Hold: 1610  Date of Legal Hold: 02/10/25  Personal Belongings: Hospital Clothing Only, Possessions Checked for Security / Elopement Risk    Interdisciplinary Discharge Planning  Does Admitting Nurse Feel This Could be a Complex Discharge?: Yes  Primary Care Physician: YESENIA Cervantes  Lives with - Patient's Self Care Capacity: Alone and Unable to Care For Self  Patient or legal guardian wants to designate a caregiver: No  Support Systems: Family Member(s), Friends / Neighbors  Housing / Facility: Mobile Home  Do You Take your Prescribed Medications Regularly: Yes  Able to Return to Previous ADL's: Future Time w/Therapy  Mobility Issues: Yes  Prior Services: Skilled Home Health Services  Assistance Needed: Yes    Discharge Preparedness  What is your plan after discharge?: Uncertain - pending medical team collaboration  What are your discharge supports?: Other (comment)  Prior Functional Level: Needs Assist with Activities of Daily Living, Needs Assist with Medication Management, Uses Walker, Uses Wheelchair  Difficulity with ADLs: Walking, Eating, Toileting, Bathing, Dressing  Difficulity with IADLs: Cooking, Driving, Laundry, Keeping track of finances, " Managing medication, Shopping    Functional Assesment  Prior Functional Level: Needs Assist with Activities of Daily Living, Needs Assist with Medication Management, Uses Walker, Uses Wheelchair    Finances  Financial Barriers to Discharge: No  Prescription Coverage: Yes    Vision / Hearing Impairment  Vision Impairment : Yes  Right Eye Vision: Impaired, Wears Glasses  Left Eye Vision: Impaired, Wears Glasses  Hearing Impairment : No    Values / Beliefs / Concerns  Values / Beliefs Concerns : No    Advance Directive  Advance Directive?: None    Domestic Abuse  Have you ever been the victim of abuse or violence?: No  Physical Abuse or Sexual Abuse: No  Verbal Abuse or Emotional Abuse: No  Possible Abuse/Neglect Reported to:: Not Applicable         Discharge Risks or Barriers  Discharge risks or barriers?: Mental health, Lives alone, no community support, Complex medical needs, Non-adherence to medication or treatment  Patient risk factors: Cognitive / sensory / physical deficit, Complex medical needs, Lack of outside supports, Lives alone and no community support, Mental health, Multiple ED visits, Multiple organizational systems involved, Noncompliance, Readmission, Vulnerable adult    Anticipated Discharge Information  Discharge Disposition: D/T to home under [facility] care in anticipation of covered skilled care (06)

## 2025-02-11 NOTE — ED NOTES
Patient's home medications have been reviewed by the pharmacy team.     Past Medical History:   Diagnosis Date    Abnormal electrocardiogram (ECG) (EKG) 11/08/2021    Acute cystitis without hematuria 01/26/2024    8/10 positive urinalysis, with associated hyperglycemia, will start antibiotics, follow-up cultures, initial culture with UA on August 8 was negative      Acute esophagitis 07/28/2022    Acute hypoxic respiratory failure (HCC) 07/22/2024    Acute metabolic encephalopathy 06/09/2023    Acute on chronic anemia 05/06/2024    Acute on chronic respiratory failure (HCC) 05/20/2024    Acute respiratory failure with hypoxia ([facility]) 07/25/2024    Acute superficial gastritis without hemorrhage 05/21/2024    Agitation 06/09/2024    KRISTAL (acute kidney injury) ([facility]) 11/08/2021    AP (abdominal pain) 06/08/2023    Chest pain in adult 11/08/2021    Chronic renal insufficiency 06/04/2024    CKD (chronic kidney disease) stage 3, GFR 30-59 ml/min 11/07/2021    Diabetes ([facility])     Diabetic ketoacidosis without coma ([facility]) 11/07/2021    Diabetic ketoacidosis without coma associated with type 2 diabetes mellitus ([facility]) 11/07/2021    Diarrhea 03/18/2022    DKA, type 1, not at goal ([facility]) 07/28/2022    Elevated troponin 06/10/2023    Esophagitis 07/28/2022    [facility] (hard of hearing)     [facility] No hearing aides    Hypercholesteremia     Impaction of intestine ([facility]) 05/21/2024    Intractable left upper quadrant abdominal pain 05/21/2024    Leukocytosis 05/20/2024    Metabolic acidosis, increased anion gap 11/07/2021    Syncope 06/09/2023    Transaminitis 06/12/2024       Patient's Medications   New Prescriptions    No medications on file   Previous Medications    CONTINUOUS GLUCOSE SENSOR (FREESTYLE MAITE 14 DAY SENSOR) MISC    1 Each every 14 days.    GABAPENTIN (NEURONTIN) 100 MG CAP    Take 2 Capsules by mouth 3 times a day for 30 days.    GLIPIZIDE (GLUCOTROL) 10 MG TAB    Take 1 Tablet by mouth every morning before breakfast.     OMEPRAZOLE (PRILOSEC) 40 MG DELAYED-RELEASE CAPSULE    Take 1 Capsule by mouth every day for 30 days.    OXYCODONE IMMEDIATE-RELEASE (ROXICODONE) 5 MG TAB    Take 1 Tablet by mouth every 8 hours as needed for Severe Pain for up to 7 days.    PIOGLITAZONE (ACTOS) 30 MG TAB    Take 0.5 Tablets by mouth every day.    SITAGLIPTIN (JANUVIA) 100 MG TAB    Take 1 Tablet by mouth every day.    VENLAFAXINE (EFFEXOR) 75 MG TAB    Take 1 Tablet by mouth every day for 30 days.   Modified Medications    No medications on file   Discontinued Medications    ALCOHOL SWABS    Wipe site with prep pad prior to injection.    BLOOD GLUCOSE MONITORING SUPPL (BLOOD GLUCOSE MONITOR SYSTEM) W/DEVICE KIT    Test blood sugar as recommended by provider.    CONTINUOUS GLUCOSE  (FREESTYLE MAITE 14 DAY READER) DEVICE    To use with freestyle maite sensor    GLUCOSE BLOOD (ONETOUCH ULTRA) STRIP    Use 1 Strip for Blood glucose checks    INSULIN PEN NEEDLE 32 G X 4 MM    Use one pen needle in pen device to inject insulin four times daily.    LANCETS    Use one lancet to test blood sugar three times daily before meals.    LIDOCAINE (ASPERFLEX) 4 % PATCH    Place 3 Patches on the skin every 24 hours for 30 days.      A:  Medications do not appear to be contributing to current complaints.     P:    Resume home meds other than glipizide. Cover with sliding scale insulin.   Monitor patient at least 3 days a week while patient is in the ED.    Mike Bloom, Luis Armando, BCPS

## 2025-02-11 NOTE — ASSESSMENT & PLAN NOTE
Currently on legal hold for suicidal ideation due to his current health  Psychiatry consulted, extended legal hold  He has not contested, expect 1 week extension

## 2025-02-11 NOTE — ED NOTES
Medication history reviewed with patient at bedside.   Med rec is complete  Allergies reviewed.     Patient has not had any outpatient antibiotics in the last 30 days.   Anticoagulants: No    Lamar Arguello

## 2025-02-11 NOTE — ED NOTES
Pt sleeping, resps even and unlabored. Room secure. Report given to sitter. Nsr on cardiac monitor with no ectopy. Rate 60s.

## 2025-02-11 NOTE — ED NOTES
Pt is unable to pass urine at this time, bladder scan done shows > 600ml.  Straight catheter done-- current out put 900 ml    ERP made aware

## 2025-02-11 NOTE — ED NOTES
Bedside report received from off going RN/tech: Perez RN, assumed care of patient.  POC discussed with patient. Call light within reach, all needs addressed at this time.       Fall risk interventions in place: Place socks on patient, Place fall risk sign on patient's door, Give patient urinal if applicable, Keep floor surfaces clean and dry, Accompanied to restroom, and Human-sitter if tele-sitter fails (all applicable per [place] Fall risk assessment)   Continuous monitoring: Cardiac Leads, Pulse Ox, or Blood Pressure  IVF/IV medications: Not Applicable   Oxygen: How many liters 2L  Bedside sitter: Pt on L2k SI with 1:1 sitter milla (name)  Isolation: Not Applicable

## 2025-02-11 NOTE — ASSESSMENT & PLAN NOTE
A1c 9.8  Insulin discontinued due to difficulty managing with impaired dexterity / vision and risk of hypoglycemia  Initiate additional pharmacotherapy (SGLT2, GLP1, etc) after discharge

## 2025-02-11 NOTE — PROGRESS NOTES
4 Eyes Skin Assessment Completed by PENNIE Nielsen and JOSE Capone.    Head WDL  Ears Redness and Blanching  Nose WDL  Mouth WDL  Neck WDL  Breast/Chest WDL  Shoulder Blades WDL  Spine Bruising  (R) Arm/Elbow/Hand WDL  (L) Arm/Elbow/Hand Dryness  Abdomen WDL  Groin WDL  Scrotum/Coccyx/Buttocks Redness and Blanching  (R) Leg Abrasion to knee    (L) Leg Abrasion to knee    (R) Heel/Foot/Toe Scab to 2nd and 3rd toe    (L) Heel/Foot/Toe Scab to great toe          Devices In Places Blood Pressure Cuff and Pulse Ox      Interventions In Place Gray Ear Foams, NC W/Ear Foams, TAP System, Pillows, Dri-Richar Pads, and Pressure Redistribution Mattress    Possible Skin Injury Yes    Pictures Uploaded Into Epic Yes  Wound Consult Placed Yes  RN Wound Prevention Protocol Ordered Yes

## 2025-02-11 NOTE — ASSESSMENT & PLAN NOTE
Improved  Patient with acute urinary retention after fall, given extensive back history concern for potential cord abnormality.    Unable to tolerate A1AT due to orthostasis  If worsening retention but improved orthostasis, will discuss with Urology if inpatient TURP is feasible  Bladder scan prn, straight cath for PVR>400cc

## 2025-02-11 NOTE — CARE PLAN
The patient is Stable - Low risk of patient condition declining or worsening    Shift Goals  Clinical Goals: MRI results, safety  Patient Goals: comfort  Family Goals: not present    Progress made toward(s) clinical / shift goals:  yes    Problem: Pain - Standard  Goal: Alleviation of pain or a reduction in pain to the patient’s comfort goal  Outcome: Progressing     Problem: Knowledge Deficit - Standard  Goal: Patient and family/care givers will demonstrate understanding of plan of care, disease process/condition, diagnostic tests and medications  Outcome: Progressing     Problem: Provide Safe Environment  Goal: Suicide environmental safety, protocols, policies, and practices will be implemented  Outcome: Progressing     Problem: Psychosocial  Goal: Patient's ability to identify and develop effective coping behaviors will improve  Outcome: Progressing  Goal: Patient's ability to identify and utilize available support systems will improve  Outcome: Progressing     Problem: Skin Integrity  Goal: Skin integrity is maintained or improved  Outcome: Progressing     Problem: Fall Risk  Goal: Patient will remain free from falls  Outcome: Progressing     Problem: Infection - Standard  Goal: Patient will remain free from infection  Outcome: Progressing  Flowsheets (Taken 2/11/2025 6174)  Standard Infection Interventions:   Assessed for signs and symptoms of infection   Implemented standard precautions   Instructed patient/family on signs and symptoms of infection   Provided education on proper hand hygiene and infection prevention measures   Assessed for removal IV, central lines, intra-arterial or urinary catheters

## 2025-02-11 NOTE — ED NOTES
Pt moved to GRN 35 by EDT.  Wounds to bilat knees assessed and clinical photos uploaded (see media).  Wound RN messaged to clarify what supplies needed.  Report to Gail CASPER.

## 2025-02-11 NOTE — ED NOTES
Bedside report received from off going RN/tech: Gail RN, assumed care of patient.  POC discussed with patient. Call light within reach, all needs addressed at this time.       Fall risk interventions in place: Not Applicable (all applicable per [place] Fall risk assessment)   Continuous monitoring: Not Applicable   IVF/IV medications: Not Applicable   Oxygen: Room Air  Bedside sitter: Pt on L2k SI with 1:1 sitter   (name), Report given to sitter, checklist completed, and checklist completed, stop sign in doorway  Isolation: Not Applicable
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
## 2025-02-11 NOTE — ED NOTES
Patient is on bed asleep, wakes up as appproached, Respirations even and unlabored. Bed in Low position. sitter at bedside in direct view of the patient, safety needs met.

## 2025-02-11 NOTE — DISCHARGE SUMMARY
ED Observation Discharge Summary    Patient:Christoph Taylor  Patient : 1965  Patient MRN: [id number]  Patient PCP: DIANA Tariq    Admit Date: 2/10/2025  Discharge Date and Time: 25 6:38 AM  Discharge Diagnosis: Urinary retention, back contusion, suicidal ideation  Discharge Attending: Chai Perea D.O.  Discharge Service: ED Observation    ED Course  Christoph is a 59 y.o. male who was evaluated at [facility] after having a ground-level fall.  The patient was seen yesterday, workup ensued including CT scans of the thoracic lumbar spine are negative for acute abnormalities, fractures.  The patient did have suicidal ideation was placed on a legal hold.  Throughout the evening the patient has not been able to urinate and bladder scan had greater than 600 mL and In-N-Out catheter revealed evidence of 900 mL.  In this setting, the patient may have a contusion to the cord or injury to the spinal cord therefore MRIs have been ordered stat.  The patient has no other neurological deficits or weakness that is new.  The patient may have a urinary tract infection yet in the setting of trauma I do believe the patient require MRIs.  For this reason, I have discussed the patient with  for hospitalization.  MRIs have been ordered stat.    Discharge Exam:  /71   Pulse 60   Temp 36.9 °C (98.4 °F) (Temporal)   Resp 19   Ht 1.829 m (6')   Wt 72.6 kg (160 lb)   SpO2 98%   BMI 21.70 kg/m² .    Constitutional: Awake and alert. Nontoxic  HENT:  Grossly normal  Eyes: Grossly normal  Neck: Normal range of motion  Cardiovascular: Normal heart rate   Thorax & Lungs: No respiratory distress  Abdomen: Nontender  Skin: Abrasion to bilateral knees  Extremities: Well perfused  Neurological: Sensation intact lower EXTR bilateral except for slight deficits in the feet, no saddle anesthesia, DTRs are 2/4 lower extremes bilaterally  Psychiatric: Suicidal and depressed  affect    Labs  Results for orders placed or performed during the hospital encounter of 02/10/25   CMP    Collection Time: 02/10/25  1:04 PM   Result Value Ref Range    Sodium 137 135 - 145 mmol/L    Potassium 4.3 3.6 - 5.5 mmol/L    Chloride 99 96 - 112 mmol/L    Co2 28 20 - 33 mmol/L    Anion Gap 10.0 7.0 - 16.0    Glucose 235 (H) 65 - 99 mg/dL    Bun 18 8 - 22 mg/dL    Creatinine 0.89 0.50 - 1.40 mg/dL    Calcium 8.9 8.5 - 10.5 mg/dL    Correct Calcium 9.1 8.5 - 10.5 mg/dL    AST(SGOT) 22 12 - 45 U/L    ALT(SGPT) 15 2 - 50 U/L    Alkaline Phosphatase 96 30 - 99 U/L    Total Bilirubin 0.3 0.1 - 1.5 mg/dL    Albumin 3.7 3.2 - 4.9 g/dL    Total Protein 6.7 6.0 - 8.2 g/dL    Globulin 3.0 1.9 - 3.5 g/dL    A-G Ratio 1.2 g/dL   CBC WITH DIFFERENTIAL    Collection Time: 02/10/25  1:04 PM   Result Value Ref Range    WBC 4.5 (L) 4.8 - 10.8 K/uL    RBC 4.24 (L) 4.70 - 6.10 M/uL    Hemoglobin 10.7 (L) 14.0 - 18.0 g/dL    Hematocrit 33.9 (L) 42.0 - 52.0 %    MCV 80.0 (L) 81.4 - 97.8 fL    MCH 25.2 (L) 27.0 - 33.0 pg    MCHC 31.6 (L) 32.3 - 36.5 g/dL    RDW 38.2 35.9 - 50.0 fL    Platelet Count 259 164 - 446 K/uL    MPV 9.6 9.0 - 12.9 fL    Neutrophils-Polys 75.50 (H) 44.00 - 72.00 %    Lymphocytes 16.70 (L) 22.00 - 41.00 %    Monocytes 5.80 0.00 - 13.40 %    Eosinophils 1.60 0.00 - 6.90 %    Basophils 0.20 0.00 - 1.80 %    Immature Granulocytes 0.20 0.00 - 0.90 %    Nucleated RBC 0.00 0.00 - 0.20 /100 WBC    Neutrophils (Absolute) 3.40 1.82 - 7.42 K/uL    Lymphs (Absolute) 0.75 (L) 1.00 - 4.80 K/uL    Monos (Absolute) 0.26 0.00 - 0.85 K/uL    Eos (Absolute) 0.07 0.00 - 0.51 K/uL    Baso (Absolute) 0.01 0.00 - 0.12 K/uL    Immature Granulocytes (abs) 0.01 0.00 - 0.11 K/uL    NRBC (Absolute) 0.00 K/uL   MAGNESIUM    Collection Time: 02/10/25  1:04 PM   Result Value Ref Range    Magnesium 1.8 1.5 - 2.5 mg/dL   ESTIMATED GFR    Collection Time: 02/10/25  1:04 PM   Result Value Ref Range    GFR (CKD-EPI) 98 >60 mL/min/1.73 m 2    POC BREATHALIZER    Collection Time: 02/10/25  1:40 PM   Result Value Ref Range    POC Breathalizer 0.000 0.00 - 0.01 Percent   EKG    Collection Time: 02/10/25  4:26 PM   Result Value Ref Range    Report       [facility]    Test Date:  2025-02-10  Pt Name:    BAILEE JACOBSON               Department: ER  MRN:        [id number]                      Room:       [facility]  Gender:     Male                         Technician: [id number]  :        1965                   Requested By:ER TRIAGE PROTOCOL  Order #:    [id number]                    Reading MD: MERISSA TORRES DO    Measurements  Intervals                                Axis  Rate:       70                           P:          61  NC:         169                          QRS:        60  QRSD:       91                           T:          70  QT:         420  QTc:        454    Interpretive Statements  Sinus rhythm  Borderline low voltage, extremity leads  Compared to ECG 2025 12:50:26  No significant changes  Electronically Signed On 02- 16:26:09 PST by MERISSA TORRES DO     POC BREATHALIZER    Collection Time: 02/10/25  4:36 PM   Result Value Ref Range    POC Breathalizer 0.000 0.00 - 0.01 Percent   POCT glucose device results    Collection Time: 25 12:58 AM   Result Value Ref Range    POC Glucose, Blood 251 (H) 65 - 99 mg/dL   POCT glucose device results    Collection Time: 25  6:06 AM   Result Value Ref Range    POC Glucose, Blood 306 (H) 65 - 99 mg/dL     *Note: Due to a large number of results and/or encounters for the requested time period, some results have not been displayed. A complete set of results can be found in Results Review.       Radiology  CT-LSPINE W/O PLUS RECONS   Final Result      1. No acute fracture or subluxation involving the lumbar spine.   2. Stable anterior wedge compression deformity of the superior endplate of T12.   3. Stable degenerative grade 1  anterolisthesis of L4 with respect to L3.   4. New postsurgical changes of posterior lateral interbody fusion at L4-5.   5. Horseshoe kidney.   6. Circumferential bladder wall thickening.      CT-TSPINE W/O PLUS RECONS   Final Result      Chronic appearing fractures without a definite acute fracture or change in alignment.      CT-CSPINE WITHOUT PLUS RECONS   Final Result      1. No acute osseous injury of the cervical spine.   2. Stable postsurgical changes of ACDF from C4 through C7.   3. Stable multilevel cervical spondylosis.      CT-HEAD W/O   Final Result      1. Age-related central and cortical atrophy.   2. No acute intracranial abnormality.               MR-THORACIC SPINE-W/O    (Results Pending)   MR-LUMBAR SPINE-W/O    (Results Pending)       Medications:   New Prescriptions    No medications on file       My final assessment includes   Low back contusion  Suicidal ideation  Abrasion  Urinary retention  Upon Reevaluation, the patient's condition has: not improved; and will be escalated to hospitalization.    Patient discharged from ED Observation status at 640 am (Time) 2/11/25 (Date).     Total time spent on this ED Observation discharge encounter is > 30 Minutes    Electronically signed by: Chai Perea D.O., 2/11/2025 6:38 AM

## 2025-02-11 NOTE — H&P
Hospital Medicine History & Physical Note    Date of Service  2/11/2025    Primary Care Physician  YESY Tariq.    Code Status  Full Code    Chief Complaint  Chief Complaint   Patient presents with    T-5000 FALL    Back Pain       History of Presenting Illness  Patient is a 59-year-old male with extensive past medical history of poorly controlled type 2 diabetes mellitus, failure to thrive, and prior suicidal ideations secondary to major depressive disorder as well as his chronic back pain status post multiple laminectomies and fractures who presents due to recurrent falls as well as progressive worsening back pain.  Patient was initially held in the emergency department due to suicidal ideation, did not meet any admission criteria, and had progressive worsening of his bilateral lower extremity.  Patient reports he chronically has fecal incontinence for the past several months, no other new neurologic symptoms.  However, patient does report new onset of acute urinary retention prompting ER provider to order stat MRI of thoracic and lumbar spine due to previous evidence of cord compression.  Patient straight cath, started on present, would likely need IR repeat postvoid residual bladder scan.    I discussed the plan of care with patient.    Review of Systems  Review of Systems   Constitutional:  Negative for chills and fever.   Respiratory:  Negative for cough, shortness of breath and wheezing.    Cardiovascular:  Negative for chest pain, palpitations and leg swelling.   Gastrointestinal:  Positive for diarrhea. Negative for abdominal pain, constipation, nausea and vomiting.   Genitourinary:  Negative for dysuria, frequency and urgency.   Musculoskeletal:  Positive for falls. Negative for myalgias.   Neurological:  Positive for focal weakness and weakness. Negative for dizziness, sensory change, seizures, loss of consciousness and headaches.       Past Medical History   has a past medical history of  Abnormal electrocardiogram (ECG) (EKG) (11/08/2021), Acute cystitis without hematuria (01/26/2024), Acute esophagitis (07/28/2022), Acute hypoxic respiratory failure (HCC) (07/22/2024), Acute metabolic encephalopathy (06/09/2023), Acute on chronic anemia (05/06/2024), Acute on chronic respiratory failure (HCC) (05/20/2024), Acute respiratory failure with hypoxia (HCC) (07/25/2024), Acute superficial gastritis without hemorrhage (05/21/2024), Agitation (06/09/2024), KRISTAL (acute kidney injury) ([facility]) (11/08/2021), AP (abdominal pain) (06/08/2023), Chest pain in adult (11/08/2021), Chronic renal insufficiency (06/04/2024), CKD (chronic kidney disease) stage 3, GFR 30-59 ml/min (11/07/2021), Diabetes ([facility]), Diabetic ketoacidosis without coma ([facility]) (11/07/2021), Diabetic ketoacidosis without coma associated with type 2 diabetes mellitus ([facility]) (11/07/2021), Diarrhea (03/18/2022), DKA, type 1, not at goal ([facility]) (07/28/2022), Elevated troponin (06/10/2023), Esophagitis (07/28/2022), [place] (hard of hearing), Hypercholesteremia, Impaction of intestine ([facility]) (05/21/2024), Intractable left upper quadrant abdominal pain (05/21/2024), Leukocytosis (05/20/2024), Metabolic acidosis, increased anion gap (11/07/2021), Syncope (06/09/2023), and Transaminitis (06/12/2024).    Surgical History   has a past surgical history that includes other; pr upper gi endoscopy,diagnosis (N/A, 3/14/2023); finger or hand incision and drainage (Right, 6/12/2023); cervical disk and fusion anterior (N/A, 6/4/2024); fusion, spine, lumbar, plif (N/A, 6/7/2024); lumbar laminectomy diskectomy (N/A, 6/7/2024); and pr upper gi endoscopy,diagnosis (N/A, 12/9/2024).     Family History  family history includes Heart Disease in his father.   Family history reviewed with patient. There is no family history that is pertinent to the chief complaint.     Social History   reports that he has never smoked. He has never used smokeless tobacco. He reports current alcohol  use. He reports that he does not currently use drugs.    Allergies  Allergies   Allergen Reactions    Ketamine Unspecified     aggressive       Medications  Prior to Admission Medications   Prescriptions Last Dose Informant Patient Reported? Taking?   Continuous Glucose Sensor (FREESTYLE MAITE 14 DAY SENSOR) [facility]  Patient No No   Si Each every 14 days.   SITagliptin (JANUVIA) 100 MG Tab 2025 Patient No Yes   Sig: Take 1 Tablet by mouth every day.   gabapentin (NEURONTIN) 100 MG Cap 2025 Morning Patient No Yes   Sig: Take 2 Capsules by mouth 3 times a day for 30 days.   glipiZIDE (GLUCOTROL) 10 MG Tab 2025 Morning Patient No Yes   Sig: Take 1 Tablet by mouth every morning before breakfast.   omeprazole (PRILOSEC) 40 MG delayed-release capsule 2025 Morning Patient No Yes   Sig: Take 1 Capsule by mouth every day for 30 days.   oxyCODONE immediate-release (ROXICODONE) 5 MG Tab 2/10/2025 Morning Patient No Yes   Sig: Take 1 Tablet by mouth every 8 hours as needed for Severe Pain for up to 7 days.   pioglitazone (ACTOS) 30 MG Tab 2025 Patient No Yes   Sig: Take 0.5 Tablets by mouth every day.   venlafaxine (EFFEXOR) 75 MG Tab 2025 Morning Patient No Yes   Sig: Take 1 Tablet by mouth every day for 30 days.      Facility-Administered Medications: None       Physical Exam  Temp:  [36.9 °C (98.4 °F)] 36.9 °C (98.4 °F)  Pulse:  [60-79] 60  Resp:  [12-19] 19  BP: ()/(54-71) 104/71  SpO2:  [76 %-100 %] 98 %  Blood Pressure: 104/71   Temperature: 36.9 °C (98.4 °F)   Pulse: 60   Respiration: 19   Pulse Oximetry: 98 %       Physical Exam  Vitals and nursing note reviewed.   Constitutional:       General: He is not in acute distress.  HENT:      Head: Normocephalic and atraumatic.      Mouth/Throat:      Mouth: Mucous membranes are moist.   Eyes:      General: No scleral icterus.     Extraocular Movements: Extraocular movements intact.   Cardiovascular:      Rate and Rhythm: Normal rate and regular  "rhythm.      Heart sounds: No murmur heard.     No gallop.   Pulmonary:      Effort: Pulmonary effort is normal. No respiratory distress.      Breath sounds: No wheezing, rhonchi or rales.   Abdominal:      General: Abdomen is flat. Bowel sounds are normal. There is no distension.      Palpations: Abdomen is soft.      Tenderness: There is no abdominal tenderness.   Musculoskeletal:         General: No swelling or tenderness.      Cervical back: Neck supple.   Skin:     General: Skin is warm.   Neurological:      Mental Status: He is alert.      Sensory: No sensory deficit.      Motor: Weakness present.      Comments: Strength 4+ in bilateral lower extremities   Psychiatric:         Mood and Affect: Mood normal.         Laboratory:  Recent Labs     02/10/25  1304   WBC 4.5*   RBC 4.24*   HEMOGLOBIN 10.7*   HEMATOCRIT 33.9*   MCV 80.0*   MCH 25.2*   MCHC 31.6*   RDW 38.2   PLATELETCT 259   MPV 9.6     Recent Labs     02/10/25  1304   SODIUM 137   POTASSIUM 4.3   CHLORIDE 99   CO2 28   GLUCOSE 235*   BUN 18   CREATININE 0.89   CALCIUM 8.9     Recent Labs     02/10/25  1304   ALTSGPT 15   ASTSGOT 22   ALKPHOSPHAT 96   TBILIRUBIN 0.3   GLUCOSE 235*         No results for input(s): \"NTPROBNP\" in the last 72 hours.      No results for input(s): \"TROPONINT\" in the last 72 hours.    Imaging:  CT-LSPINE W/O PLUS RECONS   Final Result      1. No acute fracture or subluxation involving the lumbar spine.   2. Stable anterior wedge compression deformity of the superior endplate of T12.   3. Stable degenerative grade 1 anterolisthesis of L4 with respect to L3.   4. New postsurgical changes of posterior lateral interbody fusion at L4-5.   5. Horseshoe kidney.   6. Circumferential bladder wall thickening.      CT-TSPINE W/O PLUS RECONS   Final Result      Chronic appearing fractures without a definite acute fracture or change in alignment.      CT-CSPINE WITHOUT PLUS RECONS   Final Result      1. No acute osseous injury of the " cervical spine.   2. Stable postsurgical changes of ACDF from C4 through C7.   3. Stable multilevel cervical spondylosis.      CT-HEAD W/O   Final Result      1. Age-related central and cortical atrophy.   2. No acute intracranial abnormality.               MR-THORACIC SPINE-W/O    (Results Pending)   MR-LUMBAR SPINE-W/O    (Results Pending)       EKG:  I have personally reviewed the images and compared with prior images.    Assessment/Plan:  Justification for Admission Status  I anticipate this patient will require at least two midnights for appropriate medical management, necessitating inpatient admission because pending MRI results of thoracic and lumbar spine as well as suicidal ideation    Patient will need a Med/Surg bed on MEDICAL service .  The need is secondary to potential progression of thoracic and lumbar spine and suicidal ideation.    * Urinary retention- (present on admission)  Assessment & Plan  Patient with acute urinary retention after fall, given extensive back history concern for potential cord abnormality.  Patient had reaction to Flomax in the past, will try prazosin  - Patient with profuse lightheadedness after Flomax in the past  - Will try prazosin  - Ordered PSA  - Will need repeat postvoid bladder scan    Frequent falls  Assessment & Plan  Patient with frequent falls due to chronic and progressively worsening bilateral lower extremity weakness with multiple different spinal surgeries.  Patient does report chronic bowel incontinence and recently has developed urinary retention after previous fall with concern for potential cord compression/abnormality.  Lower extremity weakness 4+ out of 5, likely generalized, however asked by ER provider to admit for MRI evaluation  -Pending MRI of thoracic and lumbar spine for further evaluation  - PT/OT in a.m.    Suicidal ideation- (present on admission)  Assessment & Plan  Currently on legal hold for suicidal ideation due to his current health  - Will  need inpatient psychiatry consult in a.m.    Acute hypoxic respiratory failure (HCC)- (present on admission)  Assessment & Plan  Patient requiring 1 to 2 L nasal cannula emergency department, anticipate likely hypoventilation induced versus nocturnal. No pulmonary symptoms to suggest infection at this time  - Continue to monitor  - Incentive spirometer    Uncontrolled type 2 diabetes mellitus with hyperglycemia (HCC)- (present on admission)  Assessment & Plan  - Ordered hemoglobin A1c  - Insulin sliding scale  -Will provide basal glargine to 15 units daily, will need titration as needed  - Holding home glycemic medications  - Titrate glucose between 140 and 180          VTE prophylaxis: enoxaparin ppx

## 2025-02-12 LAB
ALBUMIN SERPL BCP-MCNC: 3.2 G/DL (ref 3.2–4.9)
ALBUMIN/GLOB SERPL: 1.2 G/DL
ALP SERPL-CCNC: 77 U/L (ref 30–99)
ALT SERPL-CCNC: 14 U/L (ref 2–50)
ANION GAP SERPL CALC-SCNC: 7 MMOL/L (ref 7–16)
AST SERPL-CCNC: 13 U/L (ref 12–45)
BASOPHILS # BLD AUTO: 0.3 % (ref 0–1.8)
BASOPHILS # BLD: 0.01 K/UL (ref 0–0.12)
BILIRUB SERPL-MCNC: 0.2 MG/DL (ref 0.1–1.5)
BUN SERPL-MCNC: 35 MG/DL (ref 8–22)
CALCIUM ALBUM COR SERPL-MCNC: 9.1 MG/DL (ref 8.5–10.5)
CALCIUM SERPL-MCNC: 8.5 MG/DL (ref 8.5–10.5)
CHLORIDE SERPL-SCNC: 100 MMOL/L (ref 96–112)
CO2 SERPL-SCNC: 29 MMOL/L (ref 20–33)
CREAT SERPL-MCNC: 1.23 MG/DL (ref 0.5–1.4)
EOSINOPHIL # BLD AUTO: 0.14 K/UL (ref 0–0.51)
EOSINOPHIL NFR BLD: 4 % (ref 0–6.9)
ERYTHROCYTE [DISTWIDTH] IN BLOOD BY AUTOMATED COUNT: 38.4 FL (ref 35.9–50)
GFR SERPLBLD CREATININE-BSD FMLA CKD-EPI: 67 ML/MIN/1.73 M 2
GLOBULIN SER CALC-MCNC: 2.6 G/DL (ref 1.9–3.5)
GLUCOSE BLD STRIP.AUTO-MCNC: 150 MG/DL (ref 65–99)
GLUCOSE BLD STRIP.AUTO-MCNC: 178 MG/DL (ref 65–99)
GLUCOSE BLD STRIP.AUTO-MCNC: 182 MG/DL (ref 65–99)
GLUCOSE BLD STRIP.AUTO-MCNC: 211 MG/DL (ref 65–99)
GLUCOSE SERPL-MCNC: 153 MG/DL (ref 65–99)
HCT VFR BLD AUTO: 28.5 % (ref 42–52)
HGB BLD-MCNC: 9 G/DL (ref 14–18)
IMM GRANULOCYTES # BLD AUTO: 0.01 K/UL (ref 0–0.11)
IMM GRANULOCYTES NFR BLD AUTO: 0.3 % (ref 0–0.9)
LYMPHOCYTES # BLD AUTO: 1.12 K/UL (ref 1–4.8)
LYMPHOCYTES NFR BLD: 31.9 % (ref 22–41)
MAGNESIUM SERPL-MCNC: 1.7 MG/DL (ref 1.5–2.5)
MCH RBC QN AUTO: 24.9 PG (ref 27–33)
MCHC RBC AUTO-ENTMCNC: 31.6 G/DL (ref 32.3–36.5)
MCV RBC AUTO: 78.7 FL (ref 81.4–97.8)
MONOCYTES # BLD AUTO: 0.29 K/UL (ref 0–0.85)
MONOCYTES NFR BLD AUTO: 8.3 % (ref 0–13.4)
NEUTROPHILS # BLD AUTO: 1.94 K/UL (ref 1.82–7.42)
NEUTROPHILS NFR BLD: 55.2 % (ref 44–72)
NRBC # BLD AUTO: 0 K/UL
NRBC BLD-RTO: 0 /100 WBC (ref 0–0.2)
PLATELET # BLD AUTO: 240 K/UL (ref 164–446)
PMV BLD AUTO: 9.3 FL (ref 9–12.9)
POTASSIUM SERPL-SCNC: 4.1 MMOL/L (ref 3.6–5.5)
PROT SERPL-MCNC: 5.8 G/DL (ref 6–8.2)
RBC # BLD AUTO: 3.62 M/UL (ref 4.7–6.1)
SODIUM SERPL-SCNC: 136 MMOL/L (ref 135–145)
WBC # BLD AUTO: 3.5 K/UL (ref 4.8–10.8)

## 2025-02-12 PROCEDURE — 700111 HCHG RX REV CODE 636 W/ 250 OVERRIDE (IP): Mod: JZ

## 2025-02-12 PROCEDURE — 36415 COLL VENOUS BLD VENIPUNCTURE: CPT

## 2025-02-12 PROCEDURE — A9270 NON-COVERED ITEM OR SERVICE: HCPCS | Performed by: INTERNAL MEDICINE

## 2025-02-12 PROCEDURE — 80053 COMPREHEN METABOLIC PANEL: CPT

## 2025-02-12 PROCEDURE — A9270 NON-COVERED ITEM OR SERVICE: HCPCS | Performed by: EMERGENCY MEDICINE

## 2025-02-12 PROCEDURE — 85025 COMPLETE CBC W/AUTO DIFF WBC: CPT

## 2025-02-12 PROCEDURE — 99233 SBSQ HOSP IP/OBS HIGH 50: CPT | Performed by: INTERNAL MEDICINE

## 2025-02-12 PROCEDURE — 97602 WOUND(S) CARE NON-SELECTIVE: CPT

## 2025-02-12 PROCEDURE — 51798 US URINE CAPACITY MEASURE: CPT

## 2025-02-12 PROCEDURE — 83735 ASSAY OF MAGNESIUM: CPT

## 2025-02-12 PROCEDURE — 700102 HCHG RX REV CODE 250 W/ 637 OVERRIDE(OP): Performed by: INTERNAL MEDICINE

## 2025-02-12 PROCEDURE — 770001 HCHG ROOM/CARE - MED/SURG/GYN PRIV*

## 2025-02-12 PROCEDURE — A9270 NON-COVERED ITEM OR SERVICE: HCPCS

## 2025-02-12 PROCEDURE — 700102 HCHG RX REV CODE 250 W/ 637 OVERRIDE(OP)

## 2025-02-12 PROCEDURE — 700102 HCHG RX REV CODE 250 W/ 637 OVERRIDE(OP): Performed by: EMERGENCY MEDICINE

## 2025-02-12 PROCEDURE — 700105 HCHG RX REV CODE 258

## 2025-02-12 PROCEDURE — 82962 GLUCOSE BLOOD TEST: CPT

## 2025-02-12 RX ORDER — SODIUM CHLORIDE 9 MG/ML
500 INJECTION, SOLUTION INTRAVENOUS ONCE
Status: COMPLETED | OUTPATIENT
Start: 2025-02-12 | End: 2025-02-12

## 2025-02-12 RX ORDER — LANOLIN ALCOHOL/MO/W.PET/CERES
400 CREAM (GRAM) TOPICAL ONCE
Status: COMPLETED | OUTPATIENT
Start: 2025-02-12 | End: 2025-02-12

## 2025-02-12 RX ORDER — LOPERAMIDE HYDROCHLORIDE 2 MG/1
2 CAPSULE ORAL 4 TIMES DAILY PRN
Status: DISCONTINUED | OUTPATIENT
Start: 2025-02-12 | End: 2025-03-07 | Stop reason: HOSPADM

## 2025-02-12 RX ADMIN — OXYCODONE 5 MG: 5 TABLET ORAL at 04:22

## 2025-02-12 RX ADMIN — SENNOSIDES AND DOCUSATE SODIUM 2 TABLET: 50; 8.6 TABLET ORAL at 16:39

## 2025-02-12 RX ADMIN — ACETAMINOPHEN 1000 MG: 500 TABLET ORAL at 08:28

## 2025-02-12 RX ADMIN — INSULIN LISPRO 4 UNITS: 100 INJECTION, SOLUTION INTRAVENOUS; SUBCUTANEOUS at 17:39

## 2025-02-12 RX ADMIN — GABAPENTIN 200 MG: 100 CAPSULE ORAL at 16:39

## 2025-02-12 RX ADMIN — OXYCODONE 5 MG: 5 TABLET ORAL at 10:37

## 2025-02-12 RX ADMIN — SODIUM CHLORIDE 500 ML: 9 INJECTION, SOLUTION INTRAVENOUS at 19:57

## 2025-02-12 RX ADMIN — INSULIN GLARGINE-YFGN 15 UNITS: 100 INJECTION, SOLUTION SUBCUTANEOUS at 17:38

## 2025-02-12 RX ADMIN — ACETAMINOPHEN 1000 MG: 500 TABLET ORAL at 16:39

## 2025-02-12 RX ADMIN — OXYCODONE 5 MG: 5 TABLET ORAL at 21:06

## 2025-02-12 RX ADMIN — GABAPENTIN 200 MG: 100 CAPSULE ORAL at 04:21

## 2025-02-12 RX ADMIN — Medication 400 MG: at 04:21

## 2025-02-12 RX ADMIN — CELECOXIB 200 MG: 200 CAPSULE ORAL at 04:21

## 2025-02-12 RX ADMIN — GABAPENTIN 200 MG: 100 CAPSULE ORAL at 10:37

## 2025-02-12 RX ADMIN — VENLAFAXINE HYDROCHLORIDE 75 MG: 75 TABLET ORAL at 04:21

## 2025-02-12 RX ADMIN — OMEPRAZOLE 40 MG: 20 CAPSULE, DELAYED RELEASE ORAL at 04:20

## 2025-02-12 RX ADMIN — ENOXAPARIN SODIUM 40 MG: 100 INJECTION SUBCUTANEOUS at 16:40

## 2025-02-12 RX ADMIN — ACETAMINOPHEN 1000 MG: 500 TABLET ORAL at 20:01

## 2025-02-12 RX ADMIN — Medication 400 MG: at 17:37

## 2025-02-12 RX ADMIN — INSULIN LISPRO 3 UNITS: 100 INJECTION, SOLUTION INTRAVENOUS; SUBCUTANEOUS at 08:22

## 2025-02-12 RX ADMIN — TAMSULOSIN HYDROCHLORIDE 0.4 MG: 0.4 CAPSULE ORAL at 08:28

## 2025-02-12 RX ADMIN — INSULIN LISPRO 3 UNITS: 100 INJECTION, SOLUTION INTRAVENOUS; SUBCUTANEOUS at 21:00

## 2025-02-12 RX ADMIN — OXYCODONE 5 MG: 5 TABLET ORAL at 16:40

## 2025-02-12 ASSESSMENT — ENCOUNTER SYMPTOMS
NERVOUS/ANXIOUS: 1
FALLS: 1
DEPRESSION: 1
MYALGIAS: 1
BACK PAIN: 1

## 2025-02-12 ASSESSMENT — PAIN SCALES - PAIN ASSESSMENT IN ADVANCED DEMENTIA (PAINAD)
CONSOLABILITY: NO NEED TO CONSOLE
FACIALEXPRESSION: SMILING OR INEXPRESSIVE
FACIALEXPRESSION: SMILING OR INEXPRESSIVE
BODYLANGUAGE: RELAXED
BREATHING: NORMAL
TOTALSCORE: 0
BREATHING: NORMAL

## 2025-02-12 ASSESSMENT — PAIN DESCRIPTION - PAIN TYPE
TYPE: ACUTE PAIN
TYPE: ACUTE PAIN;CHRONIC PAIN
TYPE: ACUTE PAIN

## 2025-02-12 ASSESSMENT — PAIN SCALES - WONG BAKER
WONGBAKER_NUMERICALRESPONSE: HURTS JUST A LITTLE BIT
WONGBAKER_NUMERICALRESPONSE: HURTS JUST A LITTLE BIT

## 2025-02-12 NOTE — HOSPITAL COURSE
Patient is a 59-year-old male with extensive past medical history of poorly controlled type 2 diabetes mellitus, failure to thrive, and prior suicidal ideations secondary to major depressive disorder as well as his chronic back pain status post multiple laminectomies and fractures who presents due to recurrent falls as well as progressive worsening back pain. Patient was initially held in the emergency department due to suicidal ideation, did not meet any admission criteria, and had progressive worsening of his bilateral lower extremity. Patient reports he chronically has fecal incontinence for the past several months, no other new neurologic symptoms. However, patient does report new onset of acute urinary retention prompting ER provider to order stat MRI of thoracic and lumbar spine due to previous evidence of cord compression.     MRI of thoracic and lumbar showed chronic compression deformity at T12.  Postsurgical and degenerative changes.  No acute fracture.  No significant interval change.    Patient had persistent urinary retention requiring Crowley catheter placement.  Started on tamsulosin.    Patient has difficulty taking care of himself.  He has difficulty managing his diabetes.  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  Also states decreased range of motion and flexibility, dexterity of his bilateral hands which is previous cervical surgery was supposed to fix.  States he has a lot of trouble with glucometer, lancets, using insulin pens.  He was previously switched to oral hypoglycemic agents but still was not under good control and he would still like to be able to check his blood sugar because it dropped severely low previously while he was at [place].  He apparently started having convulsions and they thought a cardiac arrest.  He also has poor vision making very difficult to do manage his meds and glucometer.  Previously saw optometry and diagnosed with diabetic retinopathy  and cataracts.    Was placed on legal hold for suicidal ideations.

## 2025-02-12 NOTE — CARE PLAN
The patient is Stable - Low risk of patient condition declining or worsening    Shift Goals  Clinical Goals: Safety, Pain control, Hemodynamic Stability, Plan of care  Patient Goals: Pain control  Family Goals: Not present    Progress made toward(s) clinical / shift goals:        Pt educated to plan of care. No current thoughts of SI or HI at this time, 1:1 PSA sitter in place. PSA in possession of the call light. Pt in paper scrub pant and top, no personal belongings, no cell phone or visitors (per psych). Pt educated to plan of care, educated to not get up without staff present. Frame alarm in place. Pt able to turn side to side by self.     Problem: Pain - Standard  Goal: Alleviation of pain or a reduction in pain to the patient’s comfort goal  Outcome: Progressing  Flowsheets  Taken 2/11/2025 2300  Non Verbal Scale:   Calm   Unlabored Breathing  Taken 2/11/2025 1902  Pain Rating Scale (NPRS): 6     Problem: Knowledge Deficit - Standard  Goal: Patient and family/care givers will demonstrate understanding of plan of care, disease process/condition, diagnostic tests and medications  Outcome: Progressing     Problem: Provide Safe Environment  Goal: Suicide environmental safety, protocols, policies, and practices will be implemented  Outcome: Progressing  Flowsheets (Taken 2/11/2025 1915)  Safety Interventions:   Patient Room Close to Nursing Station   Patient Wearing Hospital Clothing   Provided Safety Education   Plastic Utensils / Paper Ware Ordered   Potentially Dangerous Items Removed from room   Discussed no self harm or elopement and safe behavior with patient     Problem: Psychosocial  Goal: Patient's ability to identify and develop effective coping behaviors will improve  Outcome: Progressing  Goal: Patient's ability to identify and utilize available support systems will improve  Outcome: Progressing     Problem: Skin Integrity  Goal: Skin integrity is maintained or improved  Outcome: Progressing      Problem: Fall Risk  Goal: Patient will remain free from falls  Outcome: Progressing     Problem: Infection - Standard  Goal: Patient will remain free from infection  Outcome: Progressing       Patient is not progressing towards the following goals:

## 2025-02-12 NOTE — PROGRESS NOTES
[facility] Medicine Daily Progress Note    Date of Service  2/12/2025    Chief Complaint  Christoph Taylor is a 59 y.o. male admitted 2/10/2025 with   Chief Complaint   Patient presents with    T-5000 FALL    Back Pain         Hospital Course  Patient is a 59-year-old male with extensive past medical history of poorly controlled type 2 diabetes mellitus, failure to thrive, and prior suicidal ideations secondary to major depressive disorder as well as his chronic back pain status post multiple laminectomies and fractures who presents due to recurrent falls as well as progressive worsening back pain. Patient was initially held in the emergency department due to suicidal ideation, did not meet any admission criteria, and had progressive worsening of his bilateral lower extremity. Patient reports he chronically has fecal incontinence for the past several months, no other new neurologic symptoms. However, patient does report new onset of acute urinary retention prompting ER provider to order stat MRI of thoracic and lumbar spine due to previous evidence of cord compression. Patient straight cath, started on present, would likely need IR repeat postvoid residual bladder scan.     Interval Problem Update  Patient was seen and examined at bedside.  I have personally reviewed and interpreted vitals, labs, and imaging.    2/11.  Afebrile intermittent hypotension.  On 1-2 L nasal cannula.  Denies fevers, chills.  Patient states he has a broken syndrome from prior chest compressions and he gets chest pain whenever he takes a deep breath.  Reports chronic back pain which is worse since he fell yesterday.  Reports neuropathy in his hands, decreased dexterity since prior laminectomy, decreased vision making it very hard to use lancets, test strips to check his blood sugars at home.  Reports being compliant with oral blood sugar meds but does not know the names.  Increase insulin sliding scale.  Continue legal hold.  MRI of  thoracic and lumbar showed no acute fracture.  This was reviewed with patient.  1/10 A1c 9.8  Procal 0.1  2/12.  Afebrile.  Intermittent hypotension.  On 1-2 L nasal cannula.  Will discontinue scheduled NSAIDs because of bump in kidney function.  Denies any fevers, chills, chest pains, shortness of breath.  He is complaining of not being able to get enough sleep.  Also reports some diarrhea which she states he gets often.  Was having urinary retention requiring Crowley catheter placement yesterday.  Continue tamsulosin.  Wbc 4.5 > 5.2 > 3.5  Hgb 10.7 > 9.9 > 9  Cr 0.91 > 1.23    I have discussed this patient's plan of care and discharge plan at IDT rounds today with Case Management, Nursing, Nursing leadership, and other members of the IDT team.    Consultants/Specialty  physiatry    Code Status  Full Code    Disposition  The patient is not medically cleared for discharge to home or a post-acute facility.      I have placed the appropriate orders for post-discharge needs.    Review of Systems  Review of Systems   Cardiovascular:  Positive for chest pain.   Genitourinary:         Urinary retention   Musculoskeletal:  Positive for back pain, falls and myalgias.   Psychiatric/Behavioral:  Positive for depression and suicidal ideas. The patient is nervous/anxious.         Physical Exam  Temp:  [36.6 °C (97.9 °F)-36.7 °C (98.1 °F)] 36.6 °C (97.9 °F)  Pulse:  [60-80] 80  Resp:  [15-19] 15  BP: ()/(58-85) 104/62  SpO2:  [94 %-100 %] 94 %    Physical Exam  Vitals and nursing note reviewed.   Constitutional:       Appearance: Normal appearance. He is ill-appearing.   HENT:      Head: Normocephalic and atraumatic.      Nose: Nose normal.      Mouth/Throat:      Mouth: Mucous membranes are moist.      Pharynx: Oropharynx is clear.   Eyes:      Extraocular Movements: Extraocular movements intact.      Conjunctiva/sclera: Conjunctivae normal.   Cardiovascular:      Rate and Rhythm: Normal rate and regular rhythm.       Pulses: Normal pulses.      Heart sounds: Normal heart sounds. No murmur heard.     No friction rub. No gallop.   Pulmonary:      Effort: Pulmonary effort is normal. No respiratory distress.      Breath sounds: Normal breath sounds. No wheezing or rales.   Chest:      Chest wall: No tenderness.   Abdominal:      General: Abdomen is flat. Bowel sounds are normal. There is no distension.      Palpations: Abdomen is soft. There is no mass.      Tenderness: There is no abdominal tenderness. There is no guarding.   Musculoskeletal:         General: Normal range of motion.      Cervical back: Normal range of motion and neck supple.   Skin:     General: Skin is warm.      Capillary Refill: Capillary refill takes less than 2 seconds.   Neurological:      General: No focal deficit present.      Mental Status: He is alert and oriented to person, place, and time. Mental status is at baseline.      Cranial Nerves: No cranial nerve deficit.      Motor: No weakness.   Psychiatric:         Mood and Affect: Mood is anxious and depressed.         Behavior: Behavior normal.         Fluids    Intake/Output Summary (Last 24 hours) at 2/12/2025 0503  Last data filed at 2/12/2025 0428  Gross per 24 hour   Intake 1240 ml   Output 700 ml   Net 540 ml        Laboratory  Recent Labs     02/10/25  1304 02/11/25  1218 02/12/25  0347   WBC 4.5* 5.2 3.5*   RBC 4.24* 3.88* 3.62*   HEMOGLOBIN 10.7* 9.9* 9.0*   HEMATOCRIT 33.9* 30.9* 28.5*   MCV 80.0* 79.6* 78.7*   MCH 25.2* 25.5* 24.9*   MCHC 31.6* 32.0* 31.6*   RDW 38.2 38.7 38.4   PLATELETCT 259 258 240   MPV 9.6 9.3 9.3     Recent Labs     02/10/25  1304 02/11/25  1218   SODIUM 137 135   POTASSIUM 4.3 4.4   CHLORIDE 99 98   CO2 28 29   GLUCOSE 235* 275*   BUN 18 27*   CREATININE 0.89 0.91   CALCIUM 8.9 8.5                   Imaging  MR-THORACIC SPINE-W/O   Final Result      1.  Moderate chronic compression deformity at T12.   2.  No acute fracture.   3.  Multifocal degenerative disease as  described above.   4.  There has been no significant interval change.      MR-LUMBAR SPINE-W/O   Final Result      1.  Postsurgical and degenerative changes as described above.   2.  Moderate chronic compression deformity at T12.   3.  No acute abnormality.   4.  There has been no significant interval change.      CT-LSPINE W/O PLUS RECONS   Final Result      1. No acute fracture or subluxation involving the lumbar spine.   2. Stable anterior wedge compression deformity of the superior endplate of T12.   3. Stable degenerative grade 1 anterolisthesis of L4 with respect to L3.   4. New postsurgical changes of posterior lateral interbody fusion at L4-5.   5. Horseshoe kidney.   6. Circumferential bladder wall thickening.      CT-TSPINE W/O PLUS RECONS   Final Result      Chronic appearing fractures without a definite acute fracture or change in alignment.      CT-CSPINE WITHOUT PLUS RECONS   Final Result      1. No acute osseous injury of the cervical spine.   2. Stable postsurgical changes of ACDF from C4 through C7.   3. Stable multilevel cervical spondylosis.      CT-HEAD W/O   Final Result      1. Age-related central and cortical atrophy.   2. No acute intracranial abnormality.                    Assessment/Plan  * Urinary retention- (present on admission)  Assessment & Plan  2/12/2025  Patient with acute urinary retention after fall, given extensive back history concern for potential cord abnormality.  Patient had reaction to Flomax in the past, will try prazosin  - Patient with profuse lightheadedness after Flomax in the past  - Ordered PSA  - Will need repeat postvoid bladder scan  Continue tamsulosin  Requiring Crowley catheter    Frequent falls  Assessment & Plan  2/12/2025  Patient with frequent falls due to chronic and progressively worsening bilateral lower extremity weakness with multiple different spinal surgeries.  Patient does report chronic bowel incontinence and recently has developed urinary retention  after previous fall with concern for potential cord compression/abnormality.  Lower extremity weakness 4+ out of 5, likely generalized, however asked by ER provider to admit for MRI evaluation  -MRI of thoracic and lumbar spine show chronic compression fracture at T12, degenerative changes.  No acute fracture  - PT/OT in a.m.    Suicidal ideation- (present on admission)  Assessment & Plan  2/12/2025  Currently on legal hold for suicidal ideation due to his current health  - Will need inpatient psychiatry consult in a.m.  Continue legal hold    Acute hypoxic respiratory failure (HCC)- (present on admission)  Assessment & Plan  2/12/2025  Patient requiring 1 to 2 L nasal cannula emergency department, anticipate likely hypoventilation induced versus nocturnal. No pulmonary symptoms to suggest infection at this time  - Continue to monitor  - Incentive spirometer    Uncontrolled type 2 diabetes mellitus with hyperglycemia (HCC)- (present on admission)  Assessment & Plan  2/12/2025  1/10 A1c 9.8  - Insulin sliding scale  -Will provide basal glargine to 15 units daily, will need titration as needed  - Holding home glycemic medications  - Titrate glucose between 140 and 180           VTE prophylaxis: Lovenox    I have performed a physical exam and reviewed and updated ROS and Plan today (2/12/2025). In review of yesterday's note (2/11/2025), there are no changes except as documented above.    Greater than 50 minutes spent prepping to see patient (e.g. review of tests) obtaining and/or reviewing separately obtained history. Performing a medically appropriate examination and/ evaluation.  Counseling and educating the patient/family/caregiver.  Ordering medications, tests, or procedures.  Referring and communicating with other health care professionals.  Documenting clinical information in EPIC.  Independently interpreting results and communicating results to patient/family/caregiver.  Care coordination.

## 2025-02-12 NOTE — CARE PLAN
The patient is Stable - Low risk of patient condition declining or worsening    Shift Goals  Clinical Goals: safety, pain control  Patient Goals: safety, pain control  Family Goals: not presetn    Progress made toward(s) clinical / shift goals:    Problem: Provide Safe Environment  Goal: Suicide environmental safety, protocols, policies, and practices will be implemented  Outcome: Progressing  Flowsheets (Taken 2/12/2025 5290)  Safety Interventions:   Patient Room Close to Nursing Station   Personal Clothing / Belongings Removed (Comment: Storage Location)   Patient Wearing Hospital Clothing   Potentially Dangerous Items Removed from room   Plastic Utensils / Paper Ware Ordered   Provided Safety Education   Discussed no self harm or elopement and safe behavior with patient   Patient Accompanied by Unit Staff when off Unit.     Problem: Psychosocial  Goal: Patient's ability to identify and develop effective coping behaviors will improve  Outcome: Progressing  Goal: Patient's ability to identify and utilize available support systems will improve  Outcome: Progressing     Problem: Skin Integrity  Goal: Skin integrity is maintained or improved  Outcome: Progressing     Problem: Fall Risk  Goal: Patient will remain free from falls  Outcome: Progressing

## 2025-02-13 PROBLEM — D63.8 ANEMIA OF CHRONIC DISEASE: Status: ACTIVE | Noted: 2024-06-12

## 2025-02-13 LAB
ALBUMIN SERPL BCP-MCNC: 3.3 G/DL (ref 3.2–4.9)
BUN SERPL-MCNC: 25 MG/DL (ref 8–22)
CALCIUM ALBUM COR SERPL-MCNC: 9.1 MG/DL (ref 8.5–10.5)
CALCIUM SERPL-MCNC: 8.5 MG/DL (ref 8.5–10.5)
CHLORIDE SERPL-SCNC: 105 MMOL/L (ref 96–112)
CO2 SERPL-SCNC: 29 MMOL/L (ref 20–33)
CREAT SERPL-MCNC: 0.84 MG/DL (ref 0.5–1.4)
ERYTHROCYTE [DISTWIDTH] IN BLOOD BY AUTOMATED COUNT: 38.4 FL (ref 35.9–50)
GFR SERPLBLD CREATININE-BSD FMLA CKD-EPI: 99 ML/MIN/1.73 M 2
GLUCOSE BLD STRIP.AUTO-MCNC: 127 MG/DL (ref 65–99)
GLUCOSE BLD STRIP.AUTO-MCNC: 136 MG/DL (ref 65–99)
GLUCOSE BLD STRIP.AUTO-MCNC: 195 MG/DL (ref 65–99)
GLUCOSE BLD STRIP.AUTO-MCNC: 198 MG/DL (ref 65–99)
GLUCOSE SERPL-MCNC: 102 MG/DL (ref 65–99)
HCT VFR BLD AUTO: 30.4 % (ref 42–52)
HGB BLD-MCNC: 9.6 G/DL (ref 14–18)
MAGNESIUM SERPL-MCNC: 1.9 MG/DL (ref 1.5–2.5)
MCH RBC QN AUTO: 24.8 PG (ref 27–33)
MCHC RBC AUTO-ENTMCNC: 31.6 G/DL (ref 32.3–36.5)
MCV RBC AUTO: 78.6 FL (ref 81.4–97.8)
PHOSPHATE SERPL-MCNC: 3.2 MG/DL (ref 2.5–4.5)
PLATELET # BLD AUTO: 233 K/UL (ref 164–446)
PMV BLD AUTO: 8.7 FL (ref 9–12.9)
POTASSIUM SERPL-SCNC: 4.3 MMOL/L (ref 3.6–5.5)
PSA FREE MFR SERPL: NORMAL %
PSA FREE SERPL-MCNC: NORMAL NG/ML
PSA SERPL-MCNC: 0.5 NG/ML (ref 0–4)
RBC # BLD AUTO: 3.87 M/UL (ref 4.7–6.1)
SODIUM SERPL-SCNC: 141 MMOL/L (ref 135–145)
WBC # BLD AUTO: 3.3 K/UL (ref 4.8–10.8)

## 2025-02-13 PROCEDURE — 82962 GLUCOSE BLOOD TEST: CPT

## 2025-02-13 PROCEDURE — A9270 NON-COVERED ITEM OR SERVICE: HCPCS

## 2025-02-13 PROCEDURE — 80069 RENAL FUNCTION PANEL: CPT

## 2025-02-13 PROCEDURE — 700111 HCHG RX REV CODE 636 W/ 250 OVERRIDE (IP): Mod: JZ

## 2025-02-13 PROCEDURE — A9270 NON-COVERED ITEM OR SERVICE: HCPCS | Performed by: EMERGENCY MEDICINE

## 2025-02-13 PROCEDURE — 99232 SBSQ HOSP IP/OBS MODERATE 35: CPT | Performed by: STUDENT IN AN ORGANIZED HEALTH CARE EDUCATION/TRAINING PROGRAM

## 2025-02-13 PROCEDURE — A9270 NON-COVERED ITEM OR SERVICE: HCPCS | Performed by: INTERNAL MEDICINE

## 2025-02-13 PROCEDURE — 36415 COLL VENOUS BLD VENIPUNCTURE: CPT

## 2025-02-13 PROCEDURE — 97167 OT EVAL HIGH COMPLEX 60 MIN: CPT

## 2025-02-13 PROCEDURE — 90832 PSYTX W PT 30 MINUTES: CPT | Performed by: SOCIAL WORKER

## 2025-02-13 PROCEDURE — 700102 HCHG RX REV CODE 250 W/ 637 OVERRIDE(OP): Performed by: EMERGENCY MEDICINE

## 2025-02-13 PROCEDURE — 700102 HCHG RX REV CODE 250 W/ 637 OVERRIDE(OP): Performed by: INTERNAL MEDICINE

## 2025-02-13 PROCEDURE — 306015 LOCK STAT FOLEY: Performed by: INTERNAL MEDICINE

## 2025-02-13 PROCEDURE — 700102 HCHG RX REV CODE 250 W/ 637 OVERRIDE(OP)

## 2025-02-13 PROCEDURE — 83735 ASSAY OF MAGNESIUM: CPT

## 2025-02-13 PROCEDURE — 97163 PT EVAL HIGH COMPLEX 45 MIN: CPT

## 2025-02-13 PROCEDURE — 85027 COMPLETE CBC AUTOMATED: CPT

## 2025-02-13 PROCEDURE — 770001 HCHG ROOM/CARE - MED/SURG/GYN PRIV*

## 2025-02-13 PROCEDURE — 99233 SBSQ HOSP IP/OBS HIGH 50: CPT | Performed by: INTERNAL MEDICINE

## 2025-02-13 RX ORDER — OXYCODONE AND ACETAMINOPHEN 5; 325 MG/1; MG/1
1 TABLET ORAL EVERY 4 HOURS PRN
Refills: 0 | Status: DISCONTINUED | OUTPATIENT
Start: 2025-02-13 | End: 2025-02-18

## 2025-02-13 RX ORDER — OXYCODONE AND ACETAMINOPHEN 10; 325 MG/1; MG/1
1 TABLET ORAL EVERY 4 HOURS PRN
Refills: 0 | Status: DISCONTINUED | OUTPATIENT
Start: 2025-02-13 | End: 2025-02-18

## 2025-02-13 RX ORDER — VENLAFAXINE 37.5 MG/1
150 TABLET ORAL DAILY
Status: DISCONTINUED | OUTPATIENT
Start: 2025-02-14 | End: 2025-02-18

## 2025-02-13 RX ORDER — LANOLIN ALCOHOL/MO/W.PET/CERES
400 CREAM (GRAM) TOPICAL 2 TIMES DAILY
Status: COMPLETED | OUTPATIENT
Start: 2025-02-13 | End: 2025-02-13

## 2025-02-13 RX ADMIN — INSULIN LISPRO 3 UNITS: 100 INJECTION, SOLUTION INTRAVENOUS; SUBCUTANEOUS at 11:38

## 2025-02-13 RX ADMIN — GABAPENTIN 200 MG: 100 CAPSULE ORAL at 05:48

## 2025-02-13 RX ADMIN — GABAPENTIN 200 MG: 100 CAPSULE ORAL at 11:34

## 2025-02-13 RX ADMIN — VENLAFAXINE HYDROCHLORIDE 75 MG: 75 TABLET ORAL at 05:49

## 2025-02-13 RX ADMIN — ENOXAPARIN SODIUM 40 MG: 100 INJECTION SUBCUTANEOUS at 17:33

## 2025-02-13 RX ADMIN — Medication 400 MG: at 08:22

## 2025-02-13 RX ADMIN — OXYCODONE AND ACETAMINOPHEN 1 TABLET: 10; 325 TABLET ORAL at 11:35

## 2025-02-13 RX ADMIN — INSULIN GLARGINE-YFGN 15 UNITS: 100 INJECTION, SOLUTION SUBCUTANEOUS at 17:37

## 2025-02-13 RX ADMIN — TAMSULOSIN HYDROCHLORIDE 0.4 MG: 0.4 CAPSULE ORAL at 08:22

## 2025-02-13 RX ADMIN — GABAPENTIN 200 MG: 100 CAPSULE ORAL at 17:32

## 2025-02-13 RX ADMIN — INSULIN LISPRO 3 UNITS: 100 INJECTION, SOLUTION INTRAVENOUS; SUBCUTANEOUS at 17:37

## 2025-02-13 RX ADMIN — OXYCODONE 5 MG: 5 TABLET ORAL at 08:23

## 2025-02-13 RX ADMIN — OMEPRAZOLE 40 MG: 20 CAPSULE, DELAYED RELEASE ORAL at 05:48

## 2025-02-13 RX ADMIN — Medication 400 MG: at 17:32

## 2025-02-13 RX ADMIN — ACETAMINOPHEN 1000 MG: 500 TABLET ORAL at 08:22

## 2025-02-13 RX ADMIN — OXYCODONE AND ACETAMINOPHEN 1 TABLET: 10; 325 TABLET ORAL at 20:44

## 2025-02-13 ASSESSMENT — ENCOUNTER SYMPTOMS
FOCAL WEAKNESS: 1
NERVOUS/ANXIOUS: 1
RESPIRATORY NEGATIVE: 1
EYES NEGATIVE: 1
MYALGIAS: 1
TREMORS: 1
TINGLING: 1
FALLS: 1
DEPRESSION: 1
CARDIOVASCULAR NEGATIVE: 1
CONSTITUTIONAL NEGATIVE: 1
GASTROINTESTINAL NEGATIVE: 1
BACK PAIN: 1

## 2025-02-13 ASSESSMENT — COGNITIVE AND FUNCTIONAL STATUS - GENERAL
DAILY ACTIVITIY SCORE: 16
TURNING FROM BACK TO SIDE WHILE IN FLAT BAD: A LITTLE
DRESSING REGULAR UPPER BODY CLOTHING: A LITTLE
SUGGESTED CMS G CODE MODIFIER MOBILITY: CK
MOBILITY SCORE: 16
CLIMB 3 TO 5 STEPS WITH RAILING: A LOT
STANDING UP FROM CHAIR USING ARMS: A LITTLE
SUGGESTED CMS G CODE MODIFIER DAILY ACTIVITY: CK
MOVING FROM LYING ON BACK TO SITTING ON SIDE OF FLAT BED: A LITTLE
DRESSING REGULAR LOWER BODY CLOTHING: A LOT
HELP NEEDED FOR BATHING: A LOT
TOILETING: A LOT
WALKING IN HOSPITAL ROOM: A LOT
PERSONAL GROOMING: A LITTLE
MOVING TO AND FROM BED TO CHAIR: A LITTLE

## 2025-02-13 ASSESSMENT — ACTIVITIES OF DAILY LIVING (ADL): TOILETING: REQUIRES ASSIST

## 2025-02-13 ASSESSMENT — PAIN DESCRIPTION - PAIN TYPE
TYPE: ACUTE PAIN

## 2025-02-13 ASSESSMENT — GAIT ASSESSMENTS: GAIT LEVEL OF ASSIST: UNABLE TO PARTICIPATE

## 2025-02-13 NOTE — PROGRESS NOTES
[facility] Medicine Daily Progress Note    Date of Service  2/13/2025    Chief Complaint  Christoph Taylor is a 59 y.o. male admitted 2/10/2025 with   Chief Complaint   Patient presents with    T-5000 FALL    Back Pain         Hospital Course  Patient is a 59-year-old male with extensive past medical history of poorly controlled type 2 diabetes mellitus, failure to thrive, and prior suicidal ideations secondary to major depressive disorder as well as his chronic back pain status post multiple laminectomies and fractures who presents due to recurrent falls as well as progressive worsening back pain. Patient was initially held in the emergency department due to suicidal ideation, did not meet any admission criteria, and had progressive worsening of his bilateral lower extremity. Patient reports he chronically has fecal incontinence for the past several months, no other new neurologic symptoms. However, patient does report new onset of acute urinary retention prompting ER provider to order stat MRI of thoracic and lumbar spine due to previous evidence of cord compression.     MRI of thoracic and lumbar showed chronic compression deformity at T12.  Postsurgical and degenerative changes.  No acute fracture.  No significant interval change.    Patient had persistent urinary retention requiring Crowley catheter placement.  Started on tamsulosin.    Patient has difficulty taking care of himself.  He has difficulty managing his diabetes.  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  Also states decreased range of motion and flexibility, dexterity of his bilateral hands which is previous cervical surgery was supposed to fix.  States he has a lot of trouble with glucometer, lancets, using insulin pens.  He was previously switched to oral hypoglycemic agents but still was not under good control and he would still like to be able to check his blood sugar because it dropped severely low previously while he  was at [place].  He apparently started having convulsions and they thought a cardiac arrest.  He also has poor vision making very difficult to do manage his meds and glucometer.  Previously saw optometry and diagnosed with diabetic retinopathy and cataracts.    Was placed on legal hold for suicidal ideations.    Interval Problem Update  Patient was seen and examined at bedside.  I have personally reviewed and interpreted vitals, labs, and imaging.    2/11.  Afebrile intermittent hypotension.  On 1-2 L nasal cannula.  Denies fevers, chills.  Patient states he has a broken syndrome from prior chest compressions and he gets chest pain whenever he takes a deep breath.  Reports chronic back pain which is worse since he fell yesterday.  Reports neuropathy in his hands, decreased dexterity since prior laminectomy, decreased vision making it very hard to use lancets, test strips to check his blood sugars at home.  Reports being compliant with oral blood sugar meds but does not know the names.  Increase insulin sliding scale.  Continue legal hold.  MRI of thoracic and lumbar showed no acute fracture.  This was reviewed with patient.  1/10 A1c 9.8  Procal 0.1  2/12.  Afebrile.  Intermittent hypotension.  On 1-2 L nasal cannula.  Will discontinue scheduled NSAIDs because of bump in kidney function.  Denies any fevers, chills, chest pains, shortness of breath.  He is complaining of not being able to get enough sleep.  Also reports some diarrhea which she states he gets often.  Was having urinary retention requiring Crowley catheter placement yesterday.  Continue tamsulosin.  2/13.  Afebrile.  Intermittent hypotension.  On room air.  Denies fevers, chills.  Complains of chronic back pain.  Patient is depressed.  Discussed with psychiatry.  Increase venlafaxine.  Continue legal hold.  Wbc 4.5 > 5.2 > 3.5 > 3.3  Hgb 10.7 > 9.9 > 9 > 9.6  Cr 0.91 > 1.23 > 0.84    I have discussed this patient's plan of care and discharge plan at IDT  rounds today with Case Management, Nursing, Nursing leadership, and other members of the IDT team.    Consultants/Specialty  physiatry    Code Status  Full Code    Disposition  The patient is not medically cleared for discharge to home or a post-acute facility.  Anticipate discharge to: a psychiatric hospital    I have placed the appropriate orders for post-discharge needs.    Review of Systems  Review of Systems   Cardiovascular:  Positive for chest pain.   Genitourinary:         Urinary retention   Musculoskeletal:  Positive for back pain, falls and myalgias.   Psychiatric/Behavioral:  Positive for depression and suicidal ideas. The patient is nervous/anxious.         Physical Exam  Temp:  [36.4 °C (97.5 °F)-36.6 °C (97.9 °F)] 36.4 °C (97.5 °F)  Pulse:  [70-84] 83  Resp:  [15-16] 15  BP: ()/(47-89) 128/78  SpO2:  [94 %-95 %] 94 %    Physical Exam  Vitals and nursing note reviewed.   Constitutional:       Appearance: Normal appearance. He is ill-appearing.   HENT:      Head: Normocephalic and atraumatic.      Nose: Nose normal.      Mouth/Throat:      Mouth: Mucous membranes are moist.      Pharynx: Oropharynx is clear.   Eyes:      Extraocular Movements: Extraocular movements intact.      Conjunctiva/sclera: Conjunctivae normal.   Cardiovascular:      Rate and Rhythm: Normal rate and regular rhythm.      Pulses: Normal pulses.      Heart sounds: Normal heart sounds. No murmur heard.     No friction rub. No gallop.   Pulmonary:      Effort: Pulmonary effort is normal. No respiratory distress.      Breath sounds: Normal breath sounds. No wheezing or rales.   Chest:      Chest wall: No tenderness.   Abdominal:      General: Abdomen is flat. Bowel sounds are normal. There is no distension.      Palpations: Abdomen is soft. There is no mass.      Tenderness: There is no abdominal tenderness. There is no guarding.   Musculoskeletal:         General: Normal range of motion.      Cervical back: Normal range of  motion and neck supple.   Skin:     General: Skin is warm.      Capillary Refill: Capillary refill takes less than 2 seconds.   Neurological:      General: No focal deficit present.      Mental Status: He is alert and oriented to person, place, and time. Mental status is at baseline.      Cranial Nerves: No cranial nerve deficit.      Motor: No weakness.   Psychiatric:         Mood and Affect: Mood is anxious and depressed.         Behavior: Behavior normal.         Fluids    Intake/Output Summary (Last 24 hours) at 2/13/2025 0510  Last data filed at 2/12/2025 1431  Gross per 24 hour   Intake --   Output 1000 ml   Net -1000 ml        Laboratory  Recent Labs     02/10/25  1304 02/11/25  1218 02/12/25  0347   WBC 4.5* 5.2 3.5*   RBC 4.24* 3.88* 3.62*   HEMOGLOBIN 10.7* 9.9* 9.0*   HEMATOCRIT 33.9* 30.9* 28.5*   MCV 80.0* 79.6* 78.7*   MCH 25.2* 25.5* 24.9*   MCHC 31.6* 32.0* 31.6*   RDW 38.2 38.7 38.4   PLATELETCT 259 258 240   MPV 9.6 9.3 9.3     Recent Labs     02/10/25  1304 02/11/25  1218 02/12/25  0347   SODIUM 137 135 136   POTASSIUM 4.3 4.4 4.1   CHLORIDE 99 98 100   CO2 28 29 29   GLUCOSE 235* 275* 153*   BUN 18 27* 35*   CREATININE 0.89 0.91 1.23   CALCIUM 8.9 8.5 8.5                   Imaging  MR-THORACIC SPINE-W/O   Final Result      1.  Moderate chronic compression deformity at T12.   2.  No acute fracture.   3.  Multifocal degenerative disease as described above.   4.  There has been no significant interval change.      MR-LUMBAR SPINE-W/O   Final Result      1.  Postsurgical and degenerative changes as described above.   2.  Moderate chronic compression deformity at T12.   3.  No acute abnormality.   4.  There has been no significant interval change.      CT-LSPINE W/O PLUS RECONS   Final Result      1. No acute fracture or subluxation involving the lumbar spine.   2. Stable anterior wedge compression deformity of the superior endplate of T12.   3. Stable degenerative grade 1 anterolisthesis of L4 with  respect to L3.   4. New postsurgical changes of posterior lateral interbody fusion at L4-5.   5. Horseshoe kidney.   6. Circumferential bladder wall thickening.      CT-TSPINE W/O PLUS RECONS   Final Result      Chronic appearing fractures without a definite acute fracture or change in alignment.      CT-CSPINE WITHOUT PLUS RECONS   Final Result      1. No acute osseous injury of the cervical spine.   2. Stable postsurgical changes of ACDF from C4 through C7.   3. Stable multilevel cervical spondylosis.      CT-HEAD W/O   Final Result      1. Age-related central and cortical atrophy.   2. No acute intracranial abnormality.                    Assessment/Plan  * Urinary retention- (present on admission)  Assessment & Plan  2/13/2025  Patient with acute urinary retention after fall, given extensive back history concern for potential cord abnormality.  Patient had reaction to Flomax in the past, will try prazosin  - Patient with profuse lightheadedness after Flomax in the past  - Ordered PSA  - Will need repeat postvoid bladder scan  Continue tamsulosin  Requiring Crowley catheter    Frequent falls  Assessment & Plan  2/13/2025  Patient with frequent falls due to chronic and progressively worsening bilateral lower extremity weakness with multiple different spinal surgeries.  Patient does report chronic bowel incontinence and recently has developed urinary retention after previous fall with concern for potential cord compression/abnormality.  Lower extremity weakness 4+ out of 5, likely generalized, however asked by ER provider to admit for MRI evaluation  -MRI of thoracic and lumbar spine show chronic compression fracture at T12, degenerative changes.  No acute fracture  Neurosurgery has recommended for outpatient follow-up and possible epidural injections  - PT/OT in a.m.    Suicidal ideation- (present on admission)  Assessment & Plan  2/13/2025  Currently on legal hold for suicidal ideation due to his current health  -  Will need inpatient psychiatry consult in a.m.  Continue legal hold    Acute hypoxic respiratory failure (HCC)- (present on admission)  Assessment & Plan  2/13/2025  Patient requiring 1 to 2 L nasal cannula emergency department, anticipate likely hypoventilation induced versus nocturnal. No pulmonary symptoms to suggest infection at this time  - Continue to monitor  - Incentive spirometer    Uncontrolled type 2 diabetes mellitus with hyperglycemia (HCC)- (present on admission)  Assessment & Plan  2/13/2025  1/10 A1c 9.8  - Insulin sliding scale  -Will provide basal glargine to 15 units daily, will need titration as needed  - Holding home glycemic medications  - Titrate glucose between 140 and 180           VTE prophylaxis: Lovenox    I have performed a physical exam and reviewed and updated ROS and Plan today (2/13/2025). In review of yesterday's note (2/12/2025), there are no changes except as documented above.    Greater than 51 minutes spent prepping to see patient (e.g. review of tests) obtaining and/or reviewing separately obtained history. Performing a medically appropriate examination and/ evaluation.  Counseling and educating the patient/family/caregiver.  Ordering medications, tests, or procedures.  Referring and communicating with other health care professionals.  Documenting clinical information in EPIC.  Independently interpreting results and communicating results to patient/family/caregiver.  Care coordination.

## 2025-02-13 NOTE — THERAPY
"Physical Therapy   Initial Evaluation     Patient Name: Christoph Taylor  Age:  59 y.o., Sex:  male  Medical Record #: [id number]  Today's Date: 2/13/2025     Precautions  Precautions: Fall Risk  Comments: LLE AFO from last admit, poor fit    Assessment   Patient is 59 y.o. male admitted with back pain MRI found to have chronic T12 compression deformity with extensive PMHx including poorly controlled T2DM, failure to thrive, SI, major depressive disorder, multiple laminectomies and fractures, cauda equina syndrome, frequent falls. Pt being medically managed for urinary retention, hyperglycemia, acute hypoxic respiratory failure.     Patient received in bed and agreeable to PT session. Pt able to mobilize grossly with CGA and FWW. Pt able to perform stand step transfer to the chair with FWW; pt with poor weight shifting and attempting to sit prematurely. Pt is primarily limited by impaired functional strength, balance, and activity tolerance. Recommend post acute placement vs supportive housing upon dc. Will follow for acute care PT needs.    Plan    Physical Therapy Initial Treatment Plan   Treatment Plan : Gait Training, Neuro Re-Education / Balance, Self Care / Home Evaluation, Stair Training, Therapeutic Activities, Therapeutic Exercise  Treatment Frequency: 3 Times per Week  Duration: Until Therapy Goals Met    DC Equipment Recommendations: Unable to determine at this time  Discharge Recommendations: Other - (pt with difficulty caring for self at baseline, would likely benefit from GH or more supportive housing)       Subjective    \"I don't have good social support\".      Objective       02/13/25 0935   Initial Contact Note    Initial Contact Note Order Received and Verified, Physical Therapy Evaluation in Progress with Full Report to Follow.   Precautions   Precautions Fall Risk   Comments LLE AFO from last admit, poor fit   Vitals   O2 (LPM) 0   O2 Delivery Device None - Room Air   Vitals Comments VSS, " "declines dizziness   Pain 0 - 10 Group   Location Back   Therapist Pain Assessment Post Activity Pain Same as Prior to Activity;Nurse Notified  (pain not rated)   Prior Living Situation   Housing / Facility 1 Story House   Steps Into Home 5   Equipment Owned Wheelchair;Front-Wheel Walker   Lives with - Patient's Self Care Capacity Alone and Unable to Care For Self   Prior Level of Functional Mobility   Bed Mobility Independent   Transfer Status Independent   Wheelchair Independent   Stairs Other (Comments)  (Reports increased difficulty with stairs, reports stairs are very challenging. Pt was able to enter his home however has not left his home since last admit due to inability to navigate stairs)   Comments Pt reporting he has had a significant decline in mobility since last admit and increased difficulty caring for himself at home. Reports limited social support. Reporting recently he  is primarily using WC in home; as of last month was able to ambulate short hosuehold distances.   History of Falls   History of Falls Yes   Date of Last Fall   (hx of frequent falls)   Cognition    Cognition / Consciousness WDL   Level of Consciousness Alert   Comments pleasant and particiaptory   Active ROM Lower Body    Active ROM Lower Body  X   Comments limited by generalized weakness, hx L foot drop   Strength Lower Body   Lower Body Strength  X   Comments generalized weakness, LLE weaker than RLE   Vision   Vision Comments impaired vision, reports he is \"almost blind\"   Balance Assessment   Sitting Balance (Static) Fair   Sitting Balance (Dynamic) Fair -   Standing Balance (Static) Fair -   Standing Balance (Dynamic) Poor +   Weight Shift Sitting Fair   Weight Shift Standing Poor   Comments w/FWW   Bed Mobility    Supine to Sit Standby Assist   Scooting Standby Assist   Comments bed flat   Gait Analysis   Gait Level Of Assist Unable to Participate   Functional Mobility   Sit to Stand Contact Guard Assist   Bed, Chair, " Wheelchair Transfer Contact Guard Assist   Transfer Method Stand Step   Mobility bed > chair   6 Clicks Assessment - How much HELP from from another person do you currently need... (If the patient hasn't done an activity recently, how much help from another person do you think he/she would need if he/she tried?)   Turning from your back to your side while in a flat bed without using bedrails? 3   Moving from lying on your back to sitting on the side of a flat bed without using bedrails? 3   Moving to and from a bed to a chair (including a wheelchair)? 3   Standing up from a chair using your arms (e.g., wheelchair, or bedside chair)? 3   Walking in hospital room? 2   Climbing 3-5 steps with a railing? 2   6 clicks Mobility Score 16   Short Term Goals    Short Term Goal # 1 Pt will be able to perform STS with FWW and SPV in 6 visits to progress functional transfers   Short Term Goal # 2 Pt will be able to perform stand step transfer with FWW and SPV in 6 visits to progress functional mobility   Short Term Goal # 3 Pt will be able to ambulate 15ft with FWW and SPV in 6 visits to progress functional ambulation   Short Term Goal # 4 Pt will be able to self propel 150ft in WC in 6 visits to progress functional mobility   Short Term Goal # 5 Pt will be able to ascend/descend 5 stairs with SPV in 6 visits in order to safely navigate his home   Education Group   Education Provided Role of Physical Therapist   Role of Physical Therapist Patient Response Patient;Acceptance;Explanation;Verbal Demonstration   Additional Comments Educated pt on importance of frequent mobility, mobilizing to the chair 3x.day for meals, and pathologies of bed rest   Physical Therapy Initial Treatment Plan    Treatment Plan  Gait Training;Neuro Re-Education / Balance;Self Care / Home Evaluation;Stair Training;Therapeutic Activities;Therapeutic Exercise   Treatment Frequency 3 Times per Week   Duration Until Therapy Goals Met   Problem List     Problems Pain;Impaired Transfers;Impaired Ambulation;Functional Strength Deficit;Impaired Balance;Decreased Activity Tolerance   Anticipated Discharge Equipment and Recommendations   DC Equipment Recommendations Unable to determine at this time   Discharge Recommendations Other -  (pt with difficulty caring for self at baseline, would likely benefit from GH or more supportive housing)   Interdisciplinary Plan of Care Collaboration   IDT Collaboration with  Nursing;Occupational Therapist   Patient Position at End of Therapy Seated;Tray Table within Reach  (1:1 sitter at bedside)   Collaboration Comments RN updated   Session Information   Date / Session Number  2/13 - 1 (1/3, 2/19)

## 2025-02-13 NOTE — CARE PLAN
The patient is Stable - Low risk of patient condition declining or worsening    Shift Goals  Clinical Goals: safety, pain control, comfort  Patient Goals: safety, pain control, rest  Family Goals: NA    Progress made toward(s) clinical / shift goals:        Problem: Pain - Standard  Goal: Alleviation of pain or a reduction in pain to the patient’s comfort goal  Description: Target End Date:  Prior to discharge or change in level of care    Document on Vitals flowsheet    1.  Document pain using the appropriate pain scale per order or unit policy  2.  Educate and implement non-pharmacologic comfort measures (i.e. relaxation, distraction, massage, cold/heat therapy, etc.)  3.  Pain management medications as ordered  4.  Reassess pain after pain med administration per policy  5.  If opiods administered assess patient's response to pain medication is appropriate per POSS sedation scale  6.  Follow pain management plan developed in collaboration with patient and interdisciplinary team (including palliative care or pain specialists if applicable)  Outcome: Progressing     Problem: Knowledge Deficit - Standard  Goal: Patient and family/care givers will demonstrate understanding of plan of care, disease process/condition, diagnostic tests and medications  Description: Target End Date:  1-3 days or as soon as patient condition allows    Document in Patient Education    1.  Patient and family/caregiver oriented to unit, equipment, visitation policy and means for communicating concern  2.  Complete/review Learning Assessment  3.  Assess knowledge level of disease process/condition, treatment plan, diagnostic tests and medications  4.  Explain disease process/condition, treatment plan, diagnostic tests and medications  Outcome: Progressing     Problem: Provide Safe Environment  Goal: Suicide environmental safety, protocols, policies, and practices will be implemented  Description: Target End Date:  resolve day 1    1.  Remove  objects or personal belongings that may cause harm or injury to self or others  2.  Dietary tray modifications (paperware)  3.  Provide a safe environment  4.  Render close patient supervision by sustaining observation or awareness of the patient at all times  Outcome: Progressing     Problem: Psychosocial  Goal: Patient's ability to identify and develop effective coping behaviors will improve  Description: Target End Date:  1 to 3 days    1.  Present opportunities for the patient to express thoughts, and feelings in a nonjudgmental environment  2.  Help the patient with problem-solving in a constructive manner.  3.  Educate the patient on cognitive-behavioral self-management responses to suicidal thoughts.  4.  Introduce the use of self-expression methods to manage suicidal feelings  5.  Provide emotional support  6.  Encourage identification of positive aspects of self  Outcome: Progressing  Goal: Patient's ability to identify and utilize available support systems will improve  Description: Target End Date:  1 to 3 days    1.  Help patient identify available resources and support systems  2.  Collaborate with interdisciplinary team  3.  Collaborate with patient, family/caregiver and other support systems  Outcome: Progressing     Problem: Skin Integrity  Goal: Skin integrity is maintained or improved  Description: Target End Date:  Prior to discharge or change in level of care    Document interventions on Skin Risk/Oscar flowsheet groups and corresponding LDA    1.  Assess and monitor skin integrity, appearance and/or temperature  2.  Assess risk factors for impaired skin integrity and/or pressures ulcers  3.  Implement precautions to protect skin integrity in collaboration with interdisciplinary team  4.  Implement pressure ulcer prevention protocol if at risk for skin breakdown  5.  Confirm wound care consult if at risk for skin breakdown  6.  Ensure patient use of pressure relieving devices  (Low air loss bed,  waffle overlay, heel protectors, ROHO cushion, etc)  Outcome: Progressing     Problem: Fall Risk  Goal: Patient will remain free from falls  Description: Target End Date:  Prior to discharge or change in level of care    Document interventions on the [place] Fall Risk Assessment    1.  Assess for fall risk factors  2.  Implement fall precautions  Outcome: Progressing       Patient is not progressing towards the following goals:
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
## 2025-02-13 NOTE — WOUND TEAM
[facility]  Inpatient Services  Wound and Skin Care Brief Evaluation    Admission Date: 2/10/2025     Last order of IP CONSULT TO WOUND CARE was found on 2/11/2025 from Hospital Encounter on 2/10/2025     HPI, PMH, SH: Reviewed    Chief Complaint   Patient presents with    T-5000 FALL    Back Pain     Diagnosis: Urinary retention [R33.9]    Unit where seen by Wound Team: T337/01     Wound consult placed regarding BL knees and toes. Chart and images reviewed. This discussed with bedside RN. This clinician in to assess patient. Patient pleasant and agreeable. BL knees with partial thickness abrasions for which xeroform (yellow) applied to provide an occlusive dressing that incorporates bacteriostatic protection. BL toes with small scattered scabs which were left open to air. Sacrum and BL heels intact. Non-selectively debrided with Wound cleanser and Gauze.     No pressure injuries or advanced wound care needs identified. Wound consult completed. No further follow up unless indicated and consulted.     Wound 01/09/25 Partial Thickness Wound Knee Anterior Bilateral (Active)   Date First Assessed/Time First Assessed: 01/09/25 2156   Present on Original Admission: Yes  Primary Wound Type: Partial Thickness Wound  Location: Knee  Wound Orientation: Anterior  Laterality: Bilateral      Assessments 2/12/2025  4:30 PM   Wound Image     Site Assessment Pink;Drainage   Periwound Assessment Intact   Margins Defined edges;Attached edges   Closure Open to air   Drainage Amount Scant   Drainage Description Serous   Treatments Cleansed;Site care   Wound Cleansing Approved Wound Cleanser   Periwound Protectant Skin Protectant Wipes to Periwound   Dressing Status Clean;Dry;Intact   Dressing Changed Changed   Dressing Cleansing/Solutions Not Applicable   Dressing Options Petrolatum Gauze (yellow);Silicone Adhesive Foam   Dressing Change/Treatment Frequency Daily, and As Needed   NEXT Dressing Change/Treatment Date  02/13/25   NEXT Weekly Photo (Inpatient Only) 02/19/25   Wound Team Following Not following   Non-staged Wound Description Partial thickness       PREVENTATIVE INTERVENTIONS:    Q shift Oscar - performed per nursing policy  Q shift pressure point assessments - performed per nursing policy    Surface/Positioning  Standard/trauma mattress - Currently in Place  Reposition q 2 hours - Currently in Place    Offloading/Redistribution  Float Heels off Bed with Pillows - Currently in Place           Containment/Moisture Prevention    Dri-chad pad - Currently in Place

## 2025-02-13 NOTE — DISCHARGE PLANNING
"PENNIE HERNANDEZ met with patient to discuss readmission.   Reports \"It just didn't work out at home, no one wants me\".   States that he had a fall at home and was unable to care for himself stating \"I can't cook, use the computer or do anything\". He reports he can use a phone (he called 911).   Denies having talked to his family since his discharge.   States, \"I couldn't find a care giver, no one would take me\". Writer asked patient to give specific reasons for no care givers but unable to. Later in interview he stated he never called care giving companies.   Writer asked him what his plan is after the hospital. Patient reported, \"I have nothing else to do but to die\". Further asked patient if he has any future goals to which he states, \"I would like to be able to live at home but I'd love to live with my kids in [place]\". Patient gave permission for writer to call his son, Nickolas.     -Writer received phone call from Siobhan [facility]: [phone] who was inquiring about readmission. RN CM gave information to Siobhan who stated she may be able to help with placement or resources. She was notified that inpatient psychiatric referrals have been sent to [place] facilities.     -RN CM plan: follow up with patient's son, Nickolas, or sister, Delia for assistance with arranging private care-givers. Continue discussion with Alert Team regarding accepting psychiatric facilities.     Case Management Discharge Planning    Admission Date: 2/10/2025  GMLOS: 3.6  ALOS: 2    6-Clicks ADL Score: 16  6-Clicks Mobility Score: 16  PT and/or OT Eval ordered: Yes  Post-acute Referrals Ordered: Yes  Post-acute Choice Obtained: No  Has referral(s) been sent to post-acute provider:  Yes    Anticipated Discharge Dispo: Discharge Disposition: D/T to home under [facility] care in anticipation of covered skilled care (06)    DME Needed: No    Action(s) Taken: Updated Provider/Nurse on Discharge Plan, Patient Conference, and DC Assessment Complete (See " below)    Escalations Completed: Pending Discharge Destination and Speciality Provider    Medically Clear: Yes    Next Steps: Pending psychiatric placement vs. Clearance.     Barriers to Discharge: Pending Placement    Is the patient up for discharge tomorrow: No.
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
## 2025-02-13 NOTE — DOCUMENTATION QUERY
[facility]                                                                       Query Response Note      PATIENT:               BAILEE JACOBSON  ACCT #:                  [id number]  MRN:                     [id number]  :                      1965  ADMIT DATE:       2/10/2025 12:39 PM  DISCH DATE:          RESPONDING  PROVIDER #:        [id number]           QUERY TEXT:    Bump is kidney function noted on  with increase in Cr form 0.89 on 2/10 to 1.23 on .    Please clarify the clinical/diagnostic relevance for the documented findings.    The patient's clinical indicators include:  2/10- Serum Cr (mg/dL) trend: 0.89-->0.91-->1.23    Risk Factors: Type 2 DM, scheduled NSAIDS, hypotension, urinary retention  Treatments: Monitoring labs, Crowley catheter, DC scheduled NSAIDS, 1L NS bolus      Contact me with any questions.    Thank you for your time and attention,  Izabel Ratliff RN, CDI  [email or web address]  Connect via email, Voalte or messenger.    Note: If you agree with a listed diagnosis, please remember to include it in your concurrent daily documentation and onto the Discharge Summary.  Options provided:   -- KRISTAL is clinically significant and ruled in   -- Findings of no clinical significance   -- Other explanation, (Please specify the other explanation)   -- Unable to determine      Query created by: Izabel Ratliff on 2025 1:33 PM    RESPONSE TEXT:    KRISTAL is clinically significant and ruled in          Electronically signed by:  MALINI HAAS MD 2025 7:04 PM

## 2025-02-13 NOTE — THERAPY
Occupational Therapy   Initial Evaluation     Patient Name: Christoph Taylor  Age:  59 y.o., Sex:  male  Medical Record #: [id number]  Today's Date: 2/13/2025     Precautions: Fall Risk  Comments: LLE AFO from last admit, poor fit    Assessment  Patient is 59 y.o. male admitted with back pain MRI found to have chronic T12 compression deformity with extensive PMHx including poorly controlled T2DM, failure to thrive, SI, major depressive disorder, multiple laminectomies and fractures, cauda equina syndrome, frequent falls. Pt being medically managed for urinary retention, hyperglycemia, acute hypoxic respiratory failure.     Pt seen for OT evaluation. Today he presented with generalized weakness, poor activity tolerance, mid back pain, impaired balance impacting ADLs/mobility. Baseline pt has difficulty caring for self with no support at home. Attempted to don AFO, unable to fit the front plate, may need re-fitting. Recommend post acute placement vs supportive housing upon Dc. Will follow for skilled OT services.    Plan    Occupational Therapy Initial Treatment Plan   Treatment Interventions: Self Care / Activities of Daily Living, Adaptive Equipment, Neuro Re-Education / Balance, Therapeutic Exercises, Therapeutic Activity  Treatment Frequency: 3 Times per Week  Duration: Until Therapy Goals Met    DC Equipment Recommendations: Unable to determine at this time  Discharge Recommendations: Recommend post-acute placement for additional occupational therapy services prior to discharge home (pt baseline with difficulty caring for self, would benefit from GH or supportive housing upon DC)      02/13/25 6506   Initial Contact Note    Initial Contact Note Order Received and Verified, Occupational Therapy Evaluation in Progress with Full Report to Follow.   Prior Living Situation   Housing / Facility   ([place] [facility] home)   Bathroom Set up Walk In Shower;Tub Transfer Bench   Equipment Owned Front-Wheel Walker;Tub / Shower  Seat;Wheelchair   Lives with - Patient's Self Care Capacity Alone and Unable to Care For Self   Comments Difficulty caring for self at baseline, unable to leave to home   Prior Level of ADL Function   Self Feeding Independent   Grooming / Hygiene Independent   Bathing Requires Assist   Dressing Requires Assist   Toileting Requires Assist   Prior Level of IADL Function   Medication Management Independent   Laundry Requires Assist   Kitchen Mobility Independent   Finances Independent   Home Management Requires Assist   Shopping Requires Assist   History of Falls   History of Falls Yes   Date of Last Fall   (hx of frequent falls)   Precautions   Precautions Fall Risk   Comments LLE AFO from last admit, poor fit   Pain 0 - 10 Group   Therapist Pain Assessment   (pain in mid back, not rated)   Cognition    Cognition / Consciousness WDL   Comments pleasant/agreeable   Active ROM Upper Body   Active ROM Upper Body  WDL   Strength Upper Body   Upper Body Strength  X   Gross Strength Generalized Weakness, Equal Bilaterally.    Coordination Upper Body   Coordination X   Comments R thumb contracture, reportedly from [facility] infection   Balance Assessment   Sitting Balance (Static) Fair   Sitting Balance (Dynamic) Fair -   Standing Balance (Static) Fair -   Standing Balance (Dynamic) Poor +   Weight Shift Sitting Fair   Weight Shift Standing Poor   Comments with FWW   Bed Mobility    Supine to Sit Standby Assist   Sit to Supine Standby Assist   ADL Assessment   Grooming Supervision;Seated   Upper Body Dressing Supervision   Lower Body Dressing Maximal Assist  (able to doff socks, required max A donning AFO and compression sock, pt reports it takes him extended time for LB dressing and he has only donned AFO a couple of times 2/2 poor fit)   How much help from another person does the patient currently need...   6 Clicks Daily Activity Score 16   Functional Mobility   Sit to Stand Contact Guard Assist   Bed, Chair, Wheelchair  Transfer Contact Guard Assist   Patient / Family Goals   Patient / Family Goal #1 none stated   Short Term Goals   Short Term Goal # 1 Pt will demo LB dressing using AD PRN SPV   Short Term Goal # 2 Pt will complete toilet txf SPV   Short Term Goal # 3 Pt will demo toileting hygiene SPV   Short Term Goal # 4 Pt will tolerate seated g/h routine SPV   Education Group   Role of Occupational Therapist Patient Response Patient;Acceptance;Explanation   Occupational Therapy Initial Treatment Plan    Treatment Interventions Self Care / Activities of Daily Living;Adaptive Equipment;Neuro Re-Education / Balance;Therapeutic Exercises;Therapeutic Activity   Treatment Frequency 3 Times per Week   Duration Until Therapy Goals Met   Problem List   Problem List Decreased Active Daily Living Skills;Decreased Homemaking Skills;Decreased Upper Extremity Strength Right;Decreased Upper Extremity Strength Left;Safety Awareness Deficits / Cognition;Decreased Activity Tolerance;Impaired Coordination Right Upper Extremity;Impaired Postural Control / Balance   Anticipated Discharge Equipment and Recommendations   DC Equipment Recommendations Unable to determine at this time   Discharge Recommendations Recommend post-acute placement for additional occupational therapy services prior to discharge home  (pt baseline with difficulty caring for self, would benefit from GH or supportive housing upon DC)   Interdisciplinary Plan of Care Collaboration   IDT Collaboration with  Nursing;Physical Therapist   Patient Position at End of Therapy Seated;Tray Table within Reach  (1:1 sitter at bedside)   Collaboration Comments RN aware   Session Information   Date / Session Number  2/13 1 (1/3, 2/19)

## 2025-02-13 NOTE — CARE PLAN
The patient is Watcher - Medium risk of patient condition declining or worsening    Shift Goals  Clinical Goals: Pain management, mobility, safety, SI precautions  Patient Goals: Comfort, pain control, POC updates  Family Goals: Not present    Progress made toward(s) clinical / shift goals:        Problem: Pain - Standard  Goal: Alleviation of pain or a reduction in pain to the patient’s comfort goal  Outcome: Progressing     Problem: Knowledge Deficit - Standard  Goal: Patient and family/care givers will demonstrate understanding of plan of care, disease process/condition, diagnostic tests and medications  Outcome: Progressing     Problem: Provide Safe Environment  Goal: Suicide environmental safety, protocols, policies, and practices will be implemented  Outcome: Progressing     Problem: Skin Integrity  Goal: Skin integrity is maintained or improved  Outcome: Progressing     Problem: Fall Risk  Goal: Patient will remain free from falls  Outcome: Progressing       Patient is not progressing towards the following goals:

## 2025-02-14 LAB
ALBUMIN SERPL BCP-MCNC: 3.5 G/DL (ref 3.2–4.9)
BUN SERPL-MCNC: 23 MG/DL (ref 8–22)
CALCIUM ALBUM COR SERPL-MCNC: 9.3 MG/DL (ref 8.5–10.5)
CALCIUM SERPL-MCNC: 8.9 MG/DL (ref 8.5–10.5)
CHLORIDE SERPL-SCNC: 102 MMOL/L (ref 96–112)
CO2 SERPL-SCNC: 28 MMOL/L (ref 20–33)
CREAT SERPL-MCNC: 0.67 MG/DL (ref 0.5–1.4)
ERYTHROCYTE [DISTWIDTH] IN BLOOD BY AUTOMATED COUNT: 38.2 FL (ref 35.9–50)
FERRITIN SERPL-MCNC: 23.6 NG/ML (ref 22–322)
GFR SERPLBLD CREATININE-BSD FMLA CKD-EPI: 106 ML/MIN/1.73 M 2
GLUCOSE BLD STRIP.AUTO-MCNC: 151 MG/DL (ref 65–99)
GLUCOSE BLD STRIP.AUTO-MCNC: 153 MG/DL (ref 65–99)
GLUCOSE BLD STRIP.AUTO-MCNC: 164 MG/DL (ref 65–99)
GLUCOSE BLD STRIP.AUTO-MCNC: 290 MG/DL (ref 65–99)
GLUCOSE SERPL-MCNC: 211 MG/DL (ref 65–99)
HCT VFR BLD AUTO: 32.5 % (ref 42–52)
HGB BLD-MCNC: 10.1 G/DL (ref 14–18)
HGB RETIC QN AUTO: 30.6 PG/CELL (ref 29–35)
IMM RETICS NFR: 4 % (ref 2.6–16.1)
IRON SATN MFR SERPL: 25 % (ref 15–55)
IRON SERPL-MCNC: 78 UG/DL (ref 50–180)
MAGNESIUM SERPL-MCNC: 1.6 MG/DL (ref 1.5–2.5)
MCH RBC QN AUTO: 24.8 PG (ref 27–33)
MCHC RBC AUTO-ENTMCNC: 31.1 G/DL (ref 32.3–36.5)
MCV RBC AUTO: 79.9 FL (ref 81.4–97.8)
PHOSPHATE SERPL-MCNC: 2.3 MG/DL (ref 2.5–4.5)
PLATELET # BLD AUTO: 213 K/UL (ref 164–446)
PMV BLD AUTO: 9.1 FL (ref 9–12.9)
POTASSIUM SERPL-SCNC: 4.8 MMOL/L (ref 3.6–5.5)
RBC # BLD AUTO: 4.07 M/UL (ref 4.7–6.1)
RETICS # AUTO: 0.03 M/UL (ref 0.04–0.12)
RETICS/RBC NFR: 0.6 % (ref 0.8–2.6)
SODIUM SERPL-SCNC: 137 MMOL/L (ref 135–145)
TIBC SERPL-MCNC: 313 UG/DL (ref 250–450)
TRANSFERRIN SERPL-MCNC: 262 MG/DL (ref 200–370)
UIBC SERPL-MCNC: 235 UG/DL (ref 110–370)
WBC # BLD AUTO: 4 K/UL (ref 4.8–10.8)

## 2025-02-14 PROCEDURE — 80069 RENAL FUNCTION PANEL: CPT

## 2025-02-14 PROCEDURE — 85046 RETICYTE/HGB CONCENTRATE: CPT

## 2025-02-14 PROCEDURE — 82728 ASSAY OF FERRITIN: CPT

## 2025-02-14 PROCEDURE — 700111 HCHG RX REV CODE 636 W/ 250 OVERRIDE (IP): Mod: JZ

## 2025-02-14 PROCEDURE — 82962 GLUCOSE BLOOD TEST: CPT

## 2025-02-14 PROCEDURE — 84466 ASSAY OF TRANSFERRIN: CPT

## 2025-02-14 PROCEDURE — 99233 SBSQ HOSP IP/OBS HIGH 50: CPT | Performed by: INTERNAL MEDICINE

## 2025-02-14 PROCEDURE — 99232 SBSQ HOSP IP/OBS MODERATE 35: CPT | Performed by: STUDENT IN AN ORGANIZED HEALTH CARE EDUCATION/TRAINING PROGRAM

## 2025-02-14 PROCEDURE — 36415 COLL VENOUS BLD VENIPUNCTURE: CPT

## 2025-02-14 PROCEDURE — A9270 NON-COVERED ITEM OR SERVICE: HCPCS | Performed by: INTERNAL MEDICINE

## 2025-02-14 PROCEDURE — 83735 ASSAY OF MAGNESIUM: CPT

## 2025-02-14 PROCEDURE — 700102 HCHG RX REV CODE 250 W/ 637 OVERRIDE(OP): Performed by: EMERGENCY MEDICINE

## 2025-02-14 PROCEDURE — 83540 ASSAY OF IRON: CPT

## 2025-02-14 PROCEDURE — 770001 HCHG ROOM/CARE - MED/SURG/GYN PRIV*

## 2025-02-14 PROCEDURE — 85027 COMPLETE CBC AUTOMATED: CPT

## 2025-02-14 PROCEDURE — 83550 IRON BINDING TEST: CPT

## 2025-02-14 PROCEDURE — 700102 HCHG RX REV CODE 250 W/ 637 OVERRIDE(OP): Performed by: INTERNAL MEDICINE

## 2025-02-14 PROCEDURE — A9270 NON-COVERED ITEM OR SERVICE: HCPCS | Performed by: EMERGENCY MEDICINE

## 2025-02-14 RX ORDER — PIOGLITAZONE 30 MG/1
30 TABLET ORAL DAILY
Status: DISCONTINUED | OUTPATIENT
Start: 2025-02-15 | End: 2025-03-07 | Stop reason: HOSPADM

## 2025-02-14 RX ORDER — LANOLIN ALCOHOL/MO/W.PET/CERES
400 CREAM (GRAM) TOPICAL 2 TIMES DAILY
Status: COMPLETED | OUTPATIENT
Start: 2025-02-14 | End: 2025-02-15

## 2025-02-14 RX ORDER — GLIPIZIDE 5 MG/1
10 TABLET ORAL
Status: DISCONTINUED | OUTPATIENT
Start: 2025-02-15 | End: 2025-03-07 | Stop reason: HOSPADM

## 2025-02-14 RX ADMIN — TAMSULOSIN HYDROCHLORIDE 0.4 MG: 0.4 CAPSULE ORAL at 09:46

## 2025-02-14 RX ADMIN — ENOXAPARIN SODIUM 40 MG: 100 INJECTION SUBCUTANEOUS at 17:44

## 2025-02-14 RX ADMIN — OXYCODONE AND ACETAMINOPHEN 1 TABLET: 10; 325 TABLET ORAL at 20:16

## 2025-02-14 RX ADMIN — OXYCODONE AND ACETAMINOPHEN 1 TABLET: 10; 325 TABLET ORAL at 13:09

## 2025-02-14 RX ADMIN — Medication 400 MG: at 17:45

## 2025-02-14 RX ADMIN — DIBASIC SODIUM PHOSPHATE, MONOBASIC POTASSIUM PHOSPHATE AND MONOBASIC SODIUM PHOSPHATE 500 MG: 852; 155; 130 TABLET ORAL at 13:01

## 2025-02-14 RX ADMIN — INSULIN LISPRO 3 UNITS: 100 INJECTION, SOLUTION INTRAVENOUS; SUBCUTANEOUS at 13:02

## 2025-02-14 RX ADMIN — GABAPENTIN 200 MG: 100 CAPSULE ORAL at 17:45

## 2025-02-14 RX ADMIN — OMEPRAZOLE 40 MG: 20 CAPSULE, DELAYED RELEASE ORAL at 06:29

## 2025-02-14 RX ADMIN — VENLAFAXINE HYDROCHLORIDE 150 MG: 37.5 TABLET ORAL at 06:29

## 2025-02-14 RX ADMIN — GABAPENTIN 200 MG: 100 CAPSULE ORAL at 06:29

## 2025-02-14 RX ADMIN — OXYCODONE AND ACETAMINOPHEN 1 TABLET: 5; 325 TABLET ORAL at 09:13

## 2025-02-14 RX ADMIN — INSULIN LISPRO 3 UNITS: 100 INJECTION, SOLUTION INTRAVENOUS; SUBCUTANEOUS at 22:10

## 2025-02-14 RX ADMIN — INSULIN LISPRO 7 UNITS: 100 INJECTION, SOLUTION INTRAVENOUS; SUBCUTANEOUS at 17:55

## 2025-02-14 RX ADMIN — INSULIN LISPRO 3 UNITS: 100 INJECTION, SOLUTION INTRAVENOUS; SUBCUTANEOUS at 09:43

## 2025-02-14 RX ADMIN — DIBASIC SODIUM PHOSPHATE, MONOBASIC POTASSIUM PHOSPHATE AND MONOBASIC SODIUM PHOSPHATE 500 MG: 852; 155; 130 TABLET ORAL at 17:45

## 2025-02-14 RX ADMIN — GABAPENTIN 200 MG: 100 CAPSULE ORAL at 12:55

## 2025-02-14 ASSESSMENT — ENCOUNTER SYMPTOMS
CARDIOVASCULAR NEGATIVE: 1
MYALGIAS: 1
SENSORY CHANGE: 1
RESPIRATORY NEGATIVE: 1
NERVOUS/ANXIOUS: 1
BACK PAIN: 1
EYES NEGATIVE: 1
GASTROINTESTINAL NEGATIVE: 1
DEPRESSION: 1
FALLS: 1
CONSTITUTIONAL NEGATIVE: 1

## 2025-02-14 ASSESSMENT — PAIN DESCRIPTION - PAIN TYPE
TYPE: ACUTE PAIN
TYPE: CHRONIC PAIN
TYPE: ACUTE PAIN
TYPE: CHRONIC PAIN
TYPE: ACUTE PAIN

## 2025-02-14 NOTE — PROGRESS NOTES
[facility] Medicine Daily Progress Note    Date of Service  2/14/2025    Chief Complaint  Christoph Taylor is a 59 y.o. male admitted 2/10/2025 with   Chief Complaint   Patient presents with    T-5000 FALL    Back Pain         Hospital Course  Patient is a 59-year-old male with extensive past medical history of poorly controlled type 2 diabetes mellitus, failure to thrive, and prior suicidal ideations secondary to major depressive disorder as well as his chronic back pain status post multiple laminectomies and fractures who presents due to recurrent falls as well as progressive worsening back pain. Patient was initially held in the emergency department due to suicidal ideation, did not meet any admission criteria, and had progressive worsening of his bilateral lower extremity. Patient reports he chronically has fecal incontinence for the past several months, no other new neurologic symptoms. However, patient does report new onset of acute urinary retention prompting ER provider to order stat MRI of thoracic and lumbar spine due to previous evidence of cord compression.     MRI of thoracic and lumbar showed chronic compression deformity at T12.  Postsurgical and degenerative changes.  No acute fracture.  No significant interval change.    Patient had persistent urinary retention requiring Crowley catheter placement.  Started on tamsulosin.    Patient has difficulty taking care of himself.  He has difficulty managing his diabetes.  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  Also states decreased range of motion and flexibility, dexterity of his bilateral hands which is previous cervical surgery was supposed to fix.  States he has a lot of trouble with glucometer, lancets, using insulin pens.  He was previously switched to oral hypoglycemic agents but still was not under good control and he would still like to be able to check his blood sugar because it dropped severely low previously while he  was at [place].  He apparently started having convulsions and they thought a cardiac arrest.  He also has poor vision making very difficult to do manage his meds and glucometer.  Previously saw optometry and diagnosed with diabetic retinopathy and cataracts.    Was placed on legal hold for suicidal ideations.    Interval Problem Update  Patient was seen and examined at bedside.  I have personally reviewed and interpreted vitals, labs, and imaging.    2/11.  Afebrile intermittent hypotension.  On 1-2 L nasal cannula.  Denies fevers, chills.  Patient states he has a broken syndrome from prior chest compressions and he gets chest pain whenever he takes a deep breath.  Reports chronic back pain which is worse since he fell yesterday.  Reports neuropathy in his hands, decreased dexterity since prior laminectomy, decreased vision making it very hard to use lancets, test strips to check his blood sugars at home.  Reports being compliant with oral blood sugar meds but does not know the names.  Increase insulin sliding scale.  Continue legal hold.  MRI of thoracic and lumbar showed no acute fracture.  This was reviewed with patient.  1/10 A1c 9.8  Procal 0.1  2/12.  Afebrile.  Intermittent hypotension.  On 1-2 L nasal cannula.  Will discontinue scheduled NSAIDs because of bump in kidney function.  Denies any fevers, chills, chest pains, shortness of breath.  He is complaining of not being able to get enough sleep.  Also reports some diarrhea which she states he gets often.  Was having urinary retention requiring Crowley catheter placement yesterday.  Continue tamsulosin.  2/13.  Afebrile.  Intermittent hypotension.  On room air.  Denies fevers, chills.  Complains of chronic back pain.  Patient is depressed.  Discussed with psychiatry.  Increase venlafaxine.  Continue legal hold.  2/14.  Afebrile.  Hypotension.  On room air.  Denies fever, chills.  Complains of chest pain when he takes deep breath.  Reports chronic back and  shoulder pain.  Will attempt voiding trial today.  Continue tamsulosin.  With his difficulty managing insulin pens due to his hands as above I will try to switch him back to oral antihyperglycemic's.  Still on legal hold.  Did not feel much different with increased dose of Effexor  Wbc 4.5 > 5.2 > 3.5 > 3.3 > 4  Hgb 10.7 > 9.9 > 9 > 9.6 > 10.1  Cr 0.91 > 1.23 > 0.84 > 0.67    I have discussed this patient's plan of care and discharge plan at IDT rounds today with Case Management, Nursing, Nursing leadership, and other members of the IDT team.    Consultants/Specialty  physiatry    Code Status  Full Code    Disposition  The patient is not medically cleared for discharge to home or a post-acute facility.  Anticipate discharge to: a psychiatric hospital    I have placed the appropriate orders for post-discharge needs.    Review of Systems  Review of Systems   Cardiovascular:  Positive for chest pain.   Genitourinary:         Urinary retention   Musculoskeletal:  Positive for back pain, falls and myalgias.   Psychiatric/Behavioral:  Positive for depression and suicidal ideas. The patient is nervous/anxious.         Physical Exam  Temp:  [36.6 °C (97.9 °F)] 36.6 °C (97.9 °F)  Pulse:  [72-81] 72  Resp:  [16-19] 16  BP: ()/(59-85) 92/59  SpO2:  [92 %] 92 %    Physical Exam  Vitals and nursing note reviewed.   Constitutional:       Appearance: Normal appearance. He is ill-appearing.   HENT:      Head: Normocephalic and atraumatic.      Nose: Nose normal.      Mouth/Throat:      Mouth: Mucous membranes are moist.      Pharynx: Oropharynx is clear.   Eyes:      Extraocular Movements: Extraocular movements intact.      Conjunctiva/sclera: Conjunctivae normal.   Cardiovascular:      Rate and Rhythm: Normal rate and regular rhythm.      Pulses: Normal pulses.      Heart sounds: Normal heart sounds. No murmur heard.     No friction rub. No gallop.   Pulmonary:      Effort: Pulmonary effort is normal. No respiratory  distress.      Breath sounds: Normal breath sounds. No wheezing or rales.   Chest:      Chest wall: No tenderness.   Abdominal:      General: Abdomen is flat. Bowel sounds are normal. There is no distension.      Palpations: Abdomen is soft. There is no mass.      Tenderness: There is no abdominal tenderness. There is no guarding.   Musculoskeletal:         General: Normal range of motion.      Cervical back: Normal range of motion and neck supple.   Skin:     General: Skin is warm.      Capillary Refill: Capillary refill takes less than 2 seconds.   Neurological:      General: No focal deficit present.      Mental Status: He is alert and oriented to person, place, and time. Mental status is at baseline.      Cranial Nerves: No cranial nerve deficit.      Motor: No weakness.   Psychiatric:         Mood and Affect: Mood is anxious and depressed.         Behavior: Behavior normal.         Fluids    Intake/Output Summary (Last 24 hours) at 2/14/2025 0601  Last data filed at 2/13/2025 1600  Gross per 24 hour   Intake 480 ml   Output 1800 ml   Net -1320 ml        Laboratory  Recent Labs     02/11/25  1218 02/12/25  0347 02/13/25  0548   WBC 5.2 3.5* 3.3*   RBC 3.88* 3.62* 3.87*   HEMOGLOBIN 9.9* 9.0* 9.6*   HEMATOCRIT 30.9* 28.5* 30.4*   MCV 79.6* 78.7* 78.6*   MCH 25.5* 24.9* 24.8*   MCHC 32.0* 31.6* 31.6*   RDW 38.7 38.4 38.4   PLATELETCT 258 240 233   MPV 9.3 9.3 8.7*     Recent Labs     02/11/25  1218 02/12/25  0347 02/13/25  0548   SODIUM 135 136 141   POTASSIUM 4.4 4.1 4.3   CHLORIDE 98 100 105   CO2 29 29 29   GLUCOSE 275* 153* 102*   BUN 27* 35* 25*   CREATININE 0.91 1.23 0.84   CALCIUM 8.5 8.5 8.5                   Imaging  MR-THORACIC SPINE-W/O   Final Result      1.  Moderate chronic compression deformity at T12.   2.  No acute fracture.   3.  Multifocal degenerative disease as described above.   4.  There has been no significant interval change.      MR-LUMBAR SPINE-W/O   Final Result      1.  Postsurgical  and degenerative changes as described above.   2.  Moderate chronic compression deformity at T12.   3.  No acute abnormality.   4.  There has been no significant interval change.      CT-LSPINE W/O PLUS RECONS   Final Result      1. No acute fracture or subluxation involving the lumbar spine.   2. Stable anterior wedge compression deformity of the superior endplate of T12.   3. Stable degenerative grade 1 anterolisthesis of L4 with respect to L3.   4. New postsurgical changes of posterior lateral interbody fusion at L4-5.   5. Horseshoe kidney.   6. Circumferential bladder wall thickening.      CT-TSPINE W/O PLUS RECONS   Final Result      Chronic appearing fractures without a definite acute fracture or change in alignment.      CT-CSPINE WITHOUT PLUS RECONS   Final Result      1. No acute osseous injury of the cervical spine.   2. Stable postsurgical changes of ACDF from C4 through C7.   3. Stable multilevel cervical spondylosis.      CT-HEAD W/O   Final Result      1. Age-related central and cortical atrophy.   2. No acute intracranial abnormality.                    Assessment/Plan  * Urinary retention- (present on admission)  Assessment & Plan  2/14/2025  Patient with acute urinary retention after fall, given extensive back history concern for potential cord abnormality.  Patient had reaction to Flomax in the past, will try prazosin  - Patient with profuse lightheadedness after Flomax in the past  - Ordered PSA  - Will need repeat postvoid bladder scan  Continue tamsulosin  Requiring Crowley catheter  Attempt voiding trial.    Frequent falls  Assessment & Plan  2/14/2025  Patient with frequent falls due to chronic and progressively worsening bilateral lower extremity weakness with multiple different spinal surgeries.  Patient does report chronic bowel incontinence and recently has developed urinary retention after previous fall with concern for potential cord compression/abnormality.  Lower extremity weakness 4+  out of 5, likely generalized, however asked by ER provider to admit for MRI evaluation  -MRI of thoracic and lumbar spine show chronic compression fracture at T12, degenerative changes.  No acute fracture  Neurosurgery has recommended for outpatient follow-up and possible epidural injections  - PT/OT in a.m.    Suicidal ideation- (present on admission)  Assessment & Plan  2/14/2025  Currently on legal hold for suicidal ideation due to his current health  - Will need inpatient psychiatry consult in a.m.  Continue legal hold    Acute hypoxic respiratory failure (HCC)- (present on admission)  Assessment & Plan  2/14/2025  Patient requiring 1 to 2 L nasal cannula emergency department, anticipate likely hypoventilation induced versus nocturnal. No pulmonary symptoms to suggest infection at this time  - Continue to monitor  - Incentive spirometer    Uncontrolled type 2 diabetes mellitus with hyperglycemia (HCC)- (present on admission)  Assessment & Plan  2/14/2025  1/10 A1c 9.8  - Insulin sliding scale  -Will provide basal glargine to 15 units daily, will need titration as needed  - Holding home glycemic medications  - Titrate glucose between 140 and 180           VTE prophylaxis: Lovenox    I have performed a physical exam and reviewed and updated ROS and Plan today (2/14/2025). In review of yesterday's note (2/13/2025), there are no changes except as documented above.    Greater than 50 minutes spent prepping to see patient (e.g. review of tests) obtaining and/or reviewing separately obtained history. Performing a medically appropriate examination and/ evaluation.  Counseling and educating the patient/family/caregiver.  Ordering medications, tests, or procedures.  Referring and communicating with other health care professionals.  Documenting clinical information in EPIC.  Independently interpreting results and communicating results to patient/family/caregiver.  Care coordination.

## 2025-02-14 NOTE — CONSULTS
"  PSYCHIATRIC FOLLOW-UP:(established)  *Reason for admission:     Urinary retention [R33.9]  *Legal Hold Status on Admission:          Initiated  Chart reviewed.         *HPI:          No acute events overnight.  Patient continues to report difficulty staying asleep.  Tossing and turning throughout the night.  Continues to have hopelessness and thoughts of suicide.  Does not want to return home because he has \"a mountain of problems\".  Denies any adverse side effects of the increase of Effexor this morning but states no changes in mood.  Patient is forward thinking and requesting to have phone to receive updates regarding his dog and family      Medical ROS (as pertinent):     Review of Systems   Constitutional: Negative.    HENT: Negative.     Eyes: Negative.    Respiratory: Negative.     Cardiovascular: Negative.    Gastrointestinal: Negative.    Genitourinary: Negative.    Musculoskeletal:  Positive for back pain and joint pain.   Skin: Negative.    Neurological:  Positive for sensory change.   Endo/Heme/Allergies: Negative.            *Psychiatric Examination:  Vitals:    02/14/25 0800   BP: 132/79   Pulse: 93   Resp: 16   Temp: 36.3 °C (97.3 °F)   SpO2: 91%       General:   - Grooming and hygiene: Appropriate hygiene and grooming  - Apparent distress: NAD   - Behavior: Calm and appropriate  - Eye Contact: Within normal limits  - no psychomotor agitation or retardation  - Participation: Active verbal participation  Orientation: Grossly oriented to person, place and time  Mood: \"Same\"  Affect: Congruent, dysthymic  Thought Process: Linear logical and goal directed  Thought Content: Endorsing SI perception: Denies auditory or visual hallucinations. No delusions noted   Attention span and concentration: Intact   Speech: Regular rate, volume and prosody  Language: Appropriate   Insight: Limited  Judgment: Fair  Recent and remote memory: Grossly intact      Current Medications:  Current Facility-Administered " Medications   Medication Dose Route Frequency Provider Last Rate Last Admin    oxyCODONE-acetaminophen (Percocet) 5-325 MG per tablet 1 Tablet  1 Tablet Oral Q4HRS PRN JOSY KimOOlga   1 Tablet at 02/14/25 0913    oxyCODONE-acetaminophen (Percocet-10)  MG per tablet 1 Tablet  1 Tablet Oral Q4HRS PRN JOSY KimOOlga   1 Tablet at 02/13/25 2044    venlafaxine (Effexor) tablet 150 mg  150 mg Oral DAILY JOSY KimOOlga   150 mg at 02/14/25 0629    loperamide (Imodium) capsule 2 mg  2 mg Oral 4X/DAY PRN Rico Peters D.O.        gabapentin (Neurontin) capsule 200 mg  200 mg Oral TID Alyson Foote D.O.   200 mg at 02/14/25 0629    omeprazole (PriLOSEC) capsule 40 mg  40 mg Oral DAILY JOSY SilvaOOlga   40 mg at 02/14/25 0629    enoxaparin (Lovenox) inj 40 mg  40 mg Subcutaneous DAILY AT 1800 JOSY RamírezOOlga   40 mg at 02/13/25 1733    senna-docusate (Pericolace Or Senokot S) 8.6-50 MG per tablet 2 Tablet  2 Tablet Oral Q EVENING Anatoliy Eagle D.O.   2 Tablet at 02/12/25 1639    And    polyethylene glycol/lytes (Miralax) Packet 1 Packet  1 Packet Oral QDAY PRN Anatoliy Eagle D.O.        Pharmacy Consult Request ...Pain Management Review 1 Each  1 Each Other PHARMACY TO DOSE Anatoliy Eagle D.O.        insulin GLARGINE (Lantus,Semglee) injection  0.2 Units/kg/day Subcutaneous Q EVENING Anatoliy Eagle D.O.   15 Units at 02/13/25 1737    ondansetron (Zofran) syringe/vial injection 4 mg  4 mg Intravenous Q4HRS PRN Rcio Peters D.O.   4 mg at 02/11/25 0948    insulin lispro (HumaLOG,AdmeLOG) subcutaneous injection  3-14 Units Subcutaneous 4X/DAY ACHS Rico Peters D.O.   3 Units at 02/14/25 0943    And    dextrose 50% (D50W) injection 25 g  25 g Intravenous Q15 MIN PRN Rico Peters D.O.        tamsulosin (Flomax) capsule 0.4 mg  0.4 mg Oral AFTER BREAKFAST Rico Peters D.O.   0.4 mg at 02/14/25 0946        Allergies:  Ketamine       Labs personally reviewed:    Recent Results (from the past 24 hours)   POCT glucose device results    Collection Time: 25 11:37 AM   Result Value Ref Range    POC Glucose, Blood 198 (H) 65 - 99 mg/dL   POCT glucose device results    Collection Time: 25  5:35 PM   Result Value Ref Range    POC Glucose, Blood 195 (H) 65 - 99 mg/dL   POCT glucose device results    Collection Time: 25  8:42 PM   Result Value Ref Range    POC Glucose, Blood 136 (H) 65 - 99 mg/dL   POCT glucose device results    Collection Time: 25  8:53 AM   Result Value Ref Range    POC Glucose, Blood 151 (H) 65 - 99 mg/dL   CBC WITHOUT DIFFERENTIAL    Collection Time: 25 10:47 AM   Result Value Ref Range    WBC 4.0 (L) 4.8 - 10.8 K/uL    RBC 4.07 (L) 4.70 - 6.10 M/uL    Hemoglobin 10.1 (L) 14.0 - 18.0 g/dL    Hematocrit 32.5 (L) 42.0 - 52.0 %    MCV 79.9 (L) 81.4 - 97.8 fL    MCH 24.8 (L) 27.0 - 33.0 pg    MCHC 31.1 (L) 32.3 - 36.5 g/dL    RDW 38.2 35.9 - 50.0 fL    Platelet Count 213 164 - 446 K/uL    MPV 9.1 9.0 - 12.9 fL   RETICULOCYTES COUNT    Collection Time: 25 10:47 AM   Result Value Ref Range    Reticulocyte Count 0.6 (L) 0.8 - 2.6 %    Retic, Absolute 0.03 (L) 0.04 - 0.12 M/uL    Imm. Reticulocyte Fraction 4.0 2.6 - 16.1 %    Retic Hgb Equivalent 30.6 29.0 - 35.0 pg/cell          *EKG:   Results for orders placed or performed during the hospital encounter of 02/10/25   EKG   Result Value Ref Range    Report       [facility] Emergency Dept.    Test Date:  2025-02-10  Pt Name:    BAILEE JACOBSON               Department: ER  MRN:        [id number]                      Room:       [facility]  Gender:     Male                         Technician: [id number]  :        1965                   Requested By:ER TRIAGE PROTOCOL  Order #:    [id number]                    Reading MD: MERISSA TORRES, DO    Measurements  Intervals                                Axis  Rate:       70                           P:          61  WA:          169                          QRS:        60  QRSD:       91                           T:          70  QT:         420  QTc:        454    Interpretive Statements  Sinus rhythm  Borderline low voltage, extremity leads  Compared to ECG 01/09/2025 12:50:26  No significant changes  Electronically Signed On 02- 16:26:09 PST by MERISSA TORRES DO             Assessment:  No significant psychiatric changes.  Pending therapeutic duration of Effexor.  Continues to be suicidal and meet criteria for legal hold at this time.    Dx:  MDD, severe,No Changes    Medical:  Principal Problem:    Urinary retention (POA: Yes)  Active Problems:    Uncontrolled type 2 diabetes mellitus with hyperglycemia (HCC) (POA: Yes)    Anemia of chronic disease (POA: Yes)    Acute hypoxic respiratory failure (HCC) (POA: Yes)    Suicidal ideation (POA: Yes)    Frequent falls (POA: Unknown)  Resolved Problems:    * No resolved hospital problems. *          Plan:  Legal hold: On legal hold  Psychotropic medications  Continue Effexor 150 mg daily for major depressive disorder  Consider dosage and of Percocet at bedtime to assist with sleep.  Sleep appears to be impaired by current pain  Please transfer pt to inpatient psychiatric hospital when medically cleared and bed is available  We will need to confirm firearms removed from home prior to discontinuation of legal hold  Brief supportive psychotherapy provided (15 minutes)  Psychiatry will follow up    Thank you for the consult.           I spent a total of 30 minutes on this case which included direct patient contact, chart review, interdisciplinary communication and documentation    This note was created using voice recognition software (Dragon). The accuracy of the dictation is limited by the abilities of the software. I have reviewed the note prior to signing. However, error related to voice recognition software and /or scribes may still exist and should be interpreted within the  appropriate context.

## 2025-02-14 NOTE — CARE PLAN
The patient is Watcher - Medium risk of patient condition declining or worsening    Shift Goals  Clinical Goals: Pain control, Safety  Patient Goals: Comfort  Family Goals: Not present    Progress made toward(s) clinical / shift goals:    Problem: Pain - Standard  Goal: Alleviation of pain or a reduction in pain to the patient’s comfort goal  Outcome: Progressing  Note: Patient educated on pain scale and to notify RN of pain. Medicated per MAR and repositioned        Problem: Knowledge Deficit - Standard  Goal: Patient and family/care givers will demonstrate understanding of plan of care, disease process/condition, diagnostic tests and medications  Outcome: Progressing  Note: Plan of care discussed, verbalized understanding. Questions answere d       Problem: Provide Safe Environment  Goal: Suicide environmental safety, protocols, policies, and practices will be implemented  Outcome: Progressing     Problem: Psychosocial  Goal: Patient's ability to identify and develop effective coping behaviors will improve  Outcome: Progressing  Goal: Patient's ability to identify and utilize available support systems will improve  Outcome: Progressing     Problem: Skin Integrity  Goal: Skin integrity is maintained or improved  Outcome: Progressing     Problem: Fall Risk  Goal: Patient will remain free from falls  Outcome: Progressing     Problem: Infection - Standard  Goal: Patient will remain free from infection  Outcome: Progressing       Patient is not progressing towards the following goals:

## 2025-02-14 NOTE — CARE PLAN
Problem: Pain - Standard  Goal: Alleviation of pain or a reduction in pain to the patient’s comfort goal  Outcome: Progressing     Problem: Knowledge Deficit - Standard  Goal: Patient and family/care givers will demonstrate understanding of plan of care, disease process/condition, diagnostic tests and medications  Outcome: Progressing     Problem: Provide Safe Environment  Goal: Suicide environmental safety, protocols, policies, and practices will be implemented  Outcome: Progressing   The patient is Stable - Low risk of patient condition declining or worsening    Shift Goals  Clinical Goals: safety pain control  Patient Goals: rest comfort  Family Goals: vicente    Progress made toward(s) clinical / shift goals:       Patient is not progressing towards the following goals:

## 2025-02-15 LAB
GLUCOSE BLD STRIP.AUTO-MCNC: 105 MG/DL (ref 65–99)
GLUCOSE BLD STRIP.AUTO-MCNC: 148 MG/DL (ref 65–99)
GLUCOSE BLD STRIP.AUTO-MCNC: 158 MG/DL (ref 65–99)
GLUCOSE BLD STRIP.AUTO-MCNC: 161 MG/DL (ref 65–99)
GLUCOSE BLD STRIP.AUTO-MCNC: 66 MG/DL (ref 65–99)

## 2025-02-15 PROCEDURE — 82962 GLUCOSE BLOOD TEST: CPT

## 2025-02-15 PROCEDURE — 700111 HCHG RX REV CODE 636 W/ 250 OVERRIDE (IP): Mod: JZ

## 2025-02-15 PROCEDURE — 51798 US URINE CAPACITY MEASURE: CPT

## 2025-02-15 PROCEDURE — 99233 SBSQ HOSP IP/OBS HIGH 50: CPT | Performed by: INTERNAL MEDICINE

## 2025-02-15 PROCEDURE — 700102 HCHG RX REV CODE 250 W/ 637 OVERRIDE(OP): Performed by: INTERNAL MEDICINE

## 2025-02-15 PROCEDURE — A9270 NON-COVERED ITEM OR SERVICE: HCPCS | Performed by: EMERGENCY MEDICINE

## 2025-02-15 PROCEDURE — 770001 HCHG ROOM/CARE - MED/SURG/GYN PRIV*

## 2025-02-15 PROCEDURE — 700102 HCHG RX REV CODE 250 W/ 637 OVERRIDE(OP): Performed by: EMERGENCY MEDICINE

## 2025-02-15 PROCEDURE — A9270 NON-COVERED ITEM OR SERVICE: HCPCS | Performed by: INTERNAL MEDICINE

## 2025-02-15 RX ADMIN — GLIPIZIDE 10 MG: 5 TABLET ORAL at 08:36

## 2025-02-15 RX ADMIN — INSULIN LISPRO 3 UNITS: 100 INJECTION, SOLUTION INTRAVENOUS; SUBCUTANEOUS at 08:41

## 2025-02-15 RX ADMIN — OXYCODONE AND ACETAMINOPHEN 1 TABLET: 10; 325 TABLET ORAL at 14:39

## 2025-02-15 RX ADMIN — OXYCODONE AND ACETAMINOPHEN 1 TABLET: 10; 325 TABLET ORAL at 09:14

## 2025-02-15 RX ADMIN — GABAPENTIN 200 MG: 100 CAPSULE ORAL at 11:49

## 2025-02-15 RX ADMIN — Medication 400 MG: at 05:03

## 2025-02-15 RX ADMIN — ENOXAPARIN SODIUM 40 MG: 100 INJECTION SUBCUTANEOUS at 18:13

## 2025-02-15 RX ADMIN — LOPERAMIDE HYDROCHLORIDE 2 MG: 2 CAPSULE ORAL at 05:03

## 2025-02-15 RX ADMIN — PIOGLITAZONE 30 MG: 30 TABLET ORAL at 05:03

## 2025-02-15 RX ADMIN — SITAGLIPTIN 100 MG: 100 TABLET, FILM COATED ORAL at 05:03

## 2025-02-15 RX ADMIN — GABAPENTIN 200 MG: 100 CAPSULE ORAL at 05:03

## 2025-02-15 RX ADMIN — INSULIN LISPRO 3 UNITS: 100 INJECTION, SOLUTION INTRAVENOUS; SUBCUTANEOUS at 20:51

## 2025-02-15 RX ADMIN — GABAPENTIN 200 MG: 100 CAPSULE ORAL at 18:13

## 2025-02-15 RX ADMIN — DIBASIC SODIUM PHOSPHATE, MONOBASIC POTASSIUM PHOSPHATE AND MONOBASIC SODIUM PHOSPHATE 500 MG: 852; 155; 130 TABLET ORAL at 05:03

## 2025-02-15 RX ADMIN — TAMSULOSIN HYDROCHLORIDE 0.4 MG: 0.4 CAPSULE ORAL at 09:14

## 2025-02-15 RX ADMIN — VENLAFAXINE HYDROCHLORIDE 150 MG: 37.5 TABLET ORAL at 05:03

## 2025-02-15 RX ADMIN — OXYCODONE AND ACETAMINOPHEN 1 TABLET: 10; 325 TABLET ORAL at 05:05

## 2025-02-15 RX ADMIN — OMEPRAZOLE 40 MG: 20 CAPSULE, DELAYED RELEASE ORAL at 05:03

## 2025-02-15 ASSESSMENT — ENCOUNTER SYMPTOMS
NERVOUS/ANXIOUS: 1
DEPRESSION: 1
MYALGIAS: 1
FALLS: 1
BACK PAIN: 1

## 2025-02-15 ASSESSMENT — PAIN DESCRIPTION - PAIN TYPE
TYPE: CHRONIC PAIN
TYPE: ACUTE PAIN
TYPE: CHRONIC PAIN

## 2025-02-15 NOTE — CARE PLAN
Problem: Pain - Standard  Goal: Alleviation of pain or a reduction in pain to the patient’s comfort goal  Outcome: Progressing     Problem: Knowledge Deficit - Standard  Goal: Patient and family/care givers will demonstrate understanding of plan of care, disease process/condition, diagnostic tests and medications  Outcome: Progressing     Problem: Provide Safe Environment  Goal: Suicide environmental safety, protocols, policies, and practices will be implemented  Outcome: Progressing   The patient is Stable - Low risk of patient condition declining or worsening    Shift Goals  Clinical Goals: pain control, safety, monitor blood sugar  Patient Goals: rest comfort  Family Goals: vicente    Progress made toward(s) clinical / shift goals:       Patient is not progressing towards the following goals:

## 2025-02-15 NOTE — CARE PLAN
The patient is Stable - Low risk of patient condition declining or worsening    Shift Goals  Clinical Goals: Safety  Patient Goals: Rest, Pain control  Family Goals: NOt present    Progress made toward(s) clinical / shift goals:    Problem: Pain - Standard  Goal: Alleviation of pain or a reduction in pain to the patient’s comfort goal  Outcome: Progressing  Note: Patient educated on pain scale and to notify RN of pain. Medicated per MAR and repositioned        Problem: Knowledge Deficit - Standard  Goal: Patient and family/care givers will demonstrate understanding of plan of care, disease process/condition, diagnostic tests and medications  Outcome: Progressing  Note: Plan of care discussed, verbalized understanding. Questions answered        Problem: Provide Safe Environment  Goal: Suicide environmental safety, protocols, policies, and practices will be implemented  Outcome: Progressing     Problem: Psychosocial  Goal: Patient's ability to identify and develop effective coping behaviors will improve  Outcome: Progressing  Goal: Patient's ability to identify and utilize available support systems will improve  Outcome: Progressing     Problem: Skin Integrity  Goal: Skin integrity is maintained or improved  Outcome: Progressing     Problem: Fall Risk  Goal: Patient will remain free from falls  Outcome: Progressing     Problem: Infection - Standard  Goal: Patient will remain free from infection  Outcome: Progressing       Patient is not progressing towards the following goals:

## 2025-02-15 NOTE — PROGRESS NOTES
[facility] Medicine Daily Progress Note    Date of Service  2/15/2025    Chief Complaint  Christoph Taylor is a 59 y.o. male admitted 2/10/2025 with   Chief Complaint   Patient presents with    T-5000 FALL    Back Pain         Hospital Course  Patient is a 59-year-old male with extensive past medical history of poorly controlled type 2 diabetes mellitus, failure to thrive, and prior suicidal ideations secondary to major depressive disorder as well as his chronic back pain status post multiple laminectomies and fractures who presents due to recurrent falls as well as progressive worsening back pain. Patient was initially held in the emergency department due to suicidal ideation, did not meet any admission criteria, and had progressive worsening of his bilateral lower extremity. Patient reports he chronically has fecal incontinence for the past several months, no other new neurologic symptoms. However, patient does report new onset of acute urinary retention prompting ER provider to order stat MRI of thoracic and lumbar spine due to previous evidence of cord compression.     MRI of thoracic and lumbar showed chronic compression deformity at T12.  Postsurgical and degenerative changes.  No acute fracture.  No significant interval change.    Patient had persistent urinary retention requiring Crowley catheter placement.  Started on tamsulosin.    Patient has difficulty taking care of himself.  He has difficulty managing his diabetes.  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  Also states decreased range of motion and flexibility, dexterity of his bilateral hands which is previous cervical surgery was supposed to fix.  States he has a lot of trouble with glucometer, lancets, using insulin pens.  He was previously switched to oral hypoglycemic agents but still was not under good control and he would still like to be able to check his blood sugar because it dropped severely low previously while he  was at [place].  He apparently started having convulsions and they thought a cardiac arrest.  He also has poor vision making very difficult to do manage his meds and glucometer.  Previously saw optometry and diagnosed with diabetic retinopathy and cataracts.    Was placed on legal hold for suicidal ideations.    Interval Problem Update  Patient was seen and examined at bedside.  I have personally reviewed and interpreted vitals, labs, and imaging.    2/11.  Afebrile intermittent hypotension.  On 1-2 L nasal cannula.  Denies fevers, chills.  Patient states he has a broken syndrome from prior chest compressions and he gets chest pain whenever he takes a deep breath.  Reports chronic back pain which is worse since he fell yesterday.  Reports neuropathy in his hands, decreased dexterity since prior laminectomy, decreased vision making it very hard to use lancets, test strips to check his blood sugars at home.  Reports being compliant with oral blood sugar meds but does not know the names.  Increase insulin sliding scale.  Continue legal hold.  MRI of thoracic and lumbar showed no acute fracture.  This was reviewed with patient.  1/10 A1c 9.8  Procal 0.1  2/12.  Afebrile.  Intermittent hypotension.  On 1-2 L nasal cannula.  Will discontinue scheduled NSAIDs because of bump in kidney function.  Denies any fevers, chills, chest pains, shortness of breath.  He is complaining of not being able to get enough sleep.  Also reports some diarrhea which she states he gets often.  Was having urinary retention requiring Crowley catheter placement yesterday.  Continue tamsulosin.  2/13.  Afebrile.  Intermittent hypotension.  On room air.  Denies fevers, chills.  Complains of chronic back pain.  Patient is depressed.  Discussed with psychiatry.  Increase venlafaxine.  Continue legal hold.  2/14.  Afebrile.  Hypotension.  On room air.  Denies fever, chills.  Complains of chest pain when he takes deep breath.  Reports chronic back and  shoulder pain.  Will attempt voiding trial today.  Continue tamsulosin.  With his difficulty managing insulin pens due to his hands as above I will try to switch him back to oral antihyperglycemic's.  Still on legal hold.  Did not feel much different with increased dose of Effexor  Wbc 4.5 > 5.2 > 3.5 > 3.3 > 4  Hgb 10.7 > 9.9 > 9 > 9.6 > 10.1  Cr 0.91 > 1.23 > 0.84 > 0.67  2/15.  Afebrile.  Intermittent hypotension.  On room air.  Denies fever, chills or chest pains, shortness of breath.  Complains of chronic back and shoulder pain.  Patient did pass voiding trial.  Blood sugars relatively controlled on oral meds.  Still on sliding scale.  Has not noticed much of a difference with his mood.  Continue legal hold    I have discussed this patient's plan of care and discharge plan at IDT rounds today with Case Management, Nursing, Nursing leadership, and other members of the IDT team.    Consultants/Specialty  physiatry    Code Status  Full Code    Disposition  The patient is not medically cleared for discharge to home or a post-acute facility.  Anticipate discharge to: home with organized home healthcare and close outpatient follow-up    I have placed the appropriate orders for post-discharge needs.    Review of Systems  Review of Systems   Cardiovascular:  Positive for chest pain.   Genitourinary:         Urinary retention   Musculoskeletal:  Positive for back pain, falls and myalgias.   Psychiatric/Behavioral:  Positive for depression and suicidal ideas. The patient is nervous/anxious.         Physical Exam  Temp:  [35.8 °C (96.5 °F)-36.6 °C (97.9 °F)] 36.6 °C (97.9 °F)  Pulse:  [74-93] 74  Resp:  [15-16] 15  BP: ()/(57-79) 96/64  SpO2:  [91 %-94 %] 92 %    Physical Exam  Vitals and nursing note reviewed.   Constitutional:       Appearance: Normal appearance. He is ill-appearing.   HENT:      Head: Normocephalic and atraumatic.      Nose: Nose normal.      Mouth/Throat:      Mouth: Mucous membranes are moist.       Pharynx: Oropharynx is clear.   Eyes:      Extraocular Movements: Extraocular movements intact.      Conjunctiva/sclera: Conjunctivae normal.   Cardiovascular:      Rate and Rhythm: Normal rate and regular rhythm.      Pulses: Normal pulses.      Heart sounds: Normal heart sounds. No murmur heard.     No friction rub. No gallop.   Pulmonary:      Effort: Pulmonary effort is normal. No respiratory distress.      Breath sounds: Normal breath sounds. No wheezing or rales.   Chest:      Chest wall: No tenderness.   Abdominal:      General: Abdomen is flat. Bowel sounds are normal. There is no distension.      Palpations: Abdomen is soft. There is no mass.      Tenderness: There is no abdominal tenderness. There is no guarding.   Musculoskeletal:         General: Normal range of motion.      Cervical back: Normal range of motion and neck supple.   Skin:     General: Skin is warm.      Capillary Refill: Capillary refill takes less than 2 seconds.   Neurological:      General: No focal deficit present.      Mental Status: He is alert and oriented to person, place, and time. Mental status is at baseline.      Cranial Nerves: No cranial nerve deficit.      Motor: No weakness.   Psychiatric:         Mood and Affect: Mood is anxious and depressed.         Behavior: Behavior normal.         Fluids    Intake/Output Summary (Last 24 hours) at 2/15/2025 0533  Last data filed at 2/14/2025 1800  Gross per 24 hour   Intake --   Output 4600 ml   Net -4600 ml        Laboratory  Recent Labs     02/13/25  0548 02/14/25  1047   WBC 3.3* 4.0*   RBC 3.87* 4.07*   HEMOGLOBIN 9.6* 10.1*   HEMATOCRIT 30.4* 32.5*   MCV 78.6* 79.9*   MCH 24.8* 24.8*   MCHC 31.6* 31.1*   RDW 38.4 38.2   PLATELETCT 233 213   MPV 8.7* 9.1     Recent Labs     02/13/25  0548 02/14/25  1047   SODIUM 141 137   POTASSIUM 4.3 4.8   CHLORIDE 105 102   CO2 29 28   GLUCOSE 102* 211*   BUN 25* 23*   CREATININE 0.84 0.67   CALCIUM 8.5 8.9                    Imaging  MR-THORACIC SPINE-W/O   Final Result      1.  Moderate chronic compression deformity at T12.   2.  No acute fracture.   3.  Multifocal degenerative disease as described above.   4.  There has been no significant interval change.      MR-LUMBAR SPINE-W/O   Final Result      1.  Postsurgical and degenerative changes as described above.   2.  Moderate chronic compression deformity at T12.   3.  No acute abnormality.   4.  There has been no significant interval change.      CT-LSPINE W/O PLUS RECONS   Final Result      1. No acute fracture or subluxation involving the lumbar spine.   2. Stable anterior wedge compression deformity of the superior endplate of T12.   3. Stable degenerative grade 1 anterolisthesis of L4 with respect to L3.   4. New postsurgical changes of posterior lateral interbody fusion at L4-5.   5. Horseshoe kidney.   6. Circumferential bladder wall thickening.      CT-TSPINE W/O PLUS RECONS   Final Result      Chronic appearing fractures without a definite acute fracture or change in alignment.      CT-CSPINE WITHOUT PLUS RECONS   Final Result      1. No acute osseous injury of the cervical spine.   2. Stable postsurgical changes of ACDF from C4 through C7.   3. Stable multilevel cervical spondylosis.      CT-HEAD W/O   Final Result      1. Age-related central and cortical atrophy.   2. No acute intracranial abnormality.                    Assessment/Plan  * Urinary retention- (present on admission)  Assessment & Plan  2/15/2025  Patient with acute urinary retention after fall, given extensive back history concern for potential cord abnormality.  Patient had reaction to Flomax in the past, will try prazosin  - Patient with profuse lightheadedness after Flomax in the past  - Ordered PSA  - Will need repeat postvoid bladder scan  Continue tamsulosin  Requiring Crowley catheter  Attempt voiding trial.    Frequent falls  Assessment & Plan  2/15/2025  Patient with frequent falls due to chronic  and progressively worsening bilateral lower extremity weakness with multiple different spinal surgeries.  Patient does report chronic bowel incontinence and recently has developed urinary retention after previous fall with concern for potential cord compression/abnormality.  Lower extremity weakness 4+ out of 5, likely generalized, however asked by ER provider to admit for MRI evaluation  -MRI of thoracic and lumbar spine show chronic compression fracture at T12, degenerative changes.  No acute fracture  Neurosurgery has recommended for outpatient follow-up and possible epidural injections  - PT/OT in a.m.    Suicidal ideation- (present on admission)  Assessment & Plan  2/15/2025  Currently on legal hold for suicidal ideation due to his current health  - Will need inpatient psychiatry consult in a.m.  Continue legal hold    Acute hypoxic respiratory failure (HCC)- (present on admission)  Assessment & Plan  2/15/2025  Patient requiring 1 to 2 L nasal cannula emergency department, anticipate likely hypoventilation induced versus nocturnal. No pulmonary symptoms to suggest infection at this time  - Continue to monitor  - Incentive spirometer    Uncontrolled type 2 diabetes mellitus with hyperglycemia (HCC)- (present on admission)  Assessment & Plan  2/15/2025  1/10 A1c 9.8  - Insulin sliding scale  -Will provide basal glargine to 15 units daily, will need titration as needed  - Holding home glycemic medications  - Titrate glucose between 140 and 180           VTE prophylaxis: Lovenox    I have performed a physical exam and reviewed and updated ROS and Plan today (2/15/2025). In review of yesterday's note (2/14/2025), there are no changes except as documented above.    Greater than 51 minutes spent prepping to see patient (e.g. review of tests) obtaining and/or reviewing separately obtained history. Performing a medically appropriate examination and/ evaluation.  Counseling and educating the patient/family/caregiver.   Ordering medications, tests, or procedures.  Referring and communicating with other health care professionals.  Documenting clinical information in EPIC.  Independently interpreting results and communicating results to patient/family/caregiver.  Care coordination.

## 2025-02-16 ENCOUNTER — APPOINTMENT (OUTPATIENT)
Dept: RADIOLOGY | Facility: MEDICAL CENTER | Age: 60
End: 2025-02-16
Attending: INTERNAL MEDICINE
Payer: COMMERCIAL

## 2025-02-16 PROBLEM — E11.3593 PROLIFERATIVE DIABETIC RETINOPATHY OF BOTH EYES WITHOUT MACULAR EDEMA ASSOCIATED WITH TYPE 2 DIABETES MELLITUS (HCC): Status: ACTIVE | Noted: 2025-02-16

## 2025-02-16 LAB
GLUCOSE BLD STRIP.AUTO-MCNC: 173 MG/DL (ref 65–99)
GLUCOSE BLD STRIP.AUTO-MCNC: 183 MG/DL (ref 65–99)
GLUCOSE BLD STRIP.AUTO-MCNC: 91 MG/DL (ref 65–99)
GLUCOSE BLD STRIP.AUTO-MCNC: 95 MG/DL (ref 65–99)

## 2025-02-16 PROCEDURE — 82962 GLUCOSE BLOOD TEST: CPT | Mod: 91

## 2025-02-16 PROCEDURE — 70481 CT ORBIT/EAR/FOSSA W/DYE: CPT

## 2025-02-16 PROCEDURE — 700117 HCHG RX CONTRAST REV CODE 255: Performed by: INTERNAL MEDICINE

## 2025-02-16 PROCEDURE — 99233 SBSQ HOSP IP/OBS HIGH 50: CPT | Performed by: INTERNAL MEDICINE

## 2025-02-16 PROCEDURE — 770001 HCHG ROOM/CARE - MED/SURG/GYN PRIV*

## 2025-02-16 PROCEDURE — 700102 HCHG RX REV CODE 250 W/ 637 OVERRIDE(OP): Performed by: INTERNAL MEDICINE

## 2025-02-16 PROCEDURE — A9270 NON-COVERED ITEM OR SERVICE: HCPCS | Performed by: INTERNAL MEDICINE

## 2025-02-16 PROCEDURE — 700111 HCHG RX REV CODE 636 W/ 250 OVERRIDE (IP): Mod: JZ

## 2025-02-16 PROCEDURE — A9270 NON-COVERED ITEM OR SERVICE: HCPCS | Performed by: EMERGENCY MEDICINE

## 2025-02-16 PROCEDURE — 700102 HCHG RX REV CODE 250 W/ 637 OVERRIDE(OP): Performed by: EMERGENCY MEDICINE

## 2025-02-16 RX ORDER — TAMSULOSIN HYDROCHLORIDE 0.4 MG/1
0.8 CAPSULE ORAL
Status: DISCONTINUED | OUTPATIENT
Start: 2025-02-17 | End: 2025-02-18

## 2025-02-16 RX ADMIN — OXYCODONE AND ACETAMINOPHEN 1 TABLET: 10; 325 TABLET ORAL at 09:11

## 2025-02-16 RX ADMIN — IOHEXOL 80 ML: 350 INJECTION, SOLUTION INTRAVENOUS at 15:05

## 2025-02-16 RX ADMIN — INSULIN LISPRO 3 UNITS: 100 INJECTION, SOLUTION INTRAVENOUS; SUBCUTANEOUS at 18:07

## 2025-02-16 RX ADMIN — TAMSULOSIN HYDROCHLORIDE 0.4 MG: 0.4 CAPSULE ORAL at 08:59

## 2025-02-16 RX ADMIN — INSULIN LISPRO 3 UNITS: 100 INJECTION, SOLUTION INTRAVENOUS; SUBCUTANEOUS at 09:07

## 2025-02-16 RX ADMIN — SITAGLIPTIN 100 MG: 100 TABLET, FILM COATED ORAL at 04:47

## 2025-02-16 RX ADMIN — ENOXAPARIN SODIUM 40 MG: 100 INJECTION SUBCUTANEOUS at 18:03

## 2025-02-16 RX ADMIN — VENLAFAXINE HYDROCHLORIDE 150 MG: 37.5 TABLET ORAL at 04:47

## 2025-02-16 RX ADMIN — GABAPENTIN 200 MG: 100 CAPSULE ORAL at 04:47

## 2025-02-16 RX ADMIN — GABAPENTIN 200 MG: 100 CAPSULE ORAL at 18:03

## 2025-02-16 RX ADMIN — OXYCODONE AND ACETAMINOPHEN 1 TABLET: 10; 325 TABLET ORAL at 22:37

## 2025-02-16 RX ADMIN — GABAPENTIN 200 MG: 100 CAPSULE ORAL at 13:10

## 2025-02-16 RX ADMIN — PIOGLITAZONE 30 MG: 30 TABLET ORAL at 04:47

## 2025-02-16 RX ADMIN — OMEPRAZOLE 40 MG: 20 CAPSULE, DELAYED RELEASE ORAL at 04:47

## 2025-02-16 RX ADMIN — GLIPIZIDE 10 MG: 5 TABLET ORAL at 08:59

## 2025-02-16 ASSESSMENT — ENCOUNTER SYMPTOMS
NERVOUS/ANXIOUS: 1
DEPRESSION: 1
BACK PAIN: 1
MYALGIAS: 1
FALLS: 1

## 2025-02-16 ASSESSMENT — PAIN DESCRIPTION - PAIN TYPE
TYPE: ACUTE PAIN
TYPE: ACUTE PAIN;CHRONIC PAIN
TYPE: ACUTE PAIN
TYPE: ACUTE PAIN;SURGICAL PAIN

## 2025-02-16 NOTE — DISCHARGE PLANNING
L2k Signed by physician for medical clearance.   Hospitalist placed progress note verifying medically cleared for inpatient psychiatric placement, requesting Alert Team to send psychiatric referrals to regional facilities.     -RN DAVID called patient's son, Nickolas, who is available for calls on the weekend and after [address] [place] [address] (He lives in [place]).   Nickolas aware of patient's readmission and stated that patient cannot afford in-home caregivers on his fixed income and that is why it was not arranged after his last discharge.   $2,400/month with only around $600 after bills.   Writer discussed need for patient to sell assets.   -Son believes patient will need to be in LTC or home with 24/7 care givers which likely cannot be afforded.     Case Management Discharge Planning    Admission Date: 2/10/2025  GMLOS: 3.6  ALOS: 5    6-Clicks ADL Score: 16  6-Clicks Mobility Score: 16  PT and/or OT Eval ordered: Yes  Post-acute Referrals Ordered: Yes  Post-acute Choice Obtained: No  Has referral(s) been sent to post-acute provider:  Yes    Anticipated Discharge Dispo: Discharge Disposition: D/T to home under [facility] care in anticipation of covered skilled care (06)    DME Needed: No    Action(s) Taken: Updated Provider/Nurse on Discharge Plan    Escalations Completed: Provider    Medically Clear: Anticipating medical clearance, not psychiatric clearance.     Next Steps: Pending medical or psychiatric placement.     Barriers to Discharge: Pending Placement    Is the patient up for discharge tomorrow: Unknown.

## 2025-02-16 NOTE — PROGRESS NOTES
[facility] Medicine Daily Progress Note    Date of Service  2/16/2025    Chief Complaint  Christoph Taylor is a 59 y.o. male admitted 2/10/2025 with   Chief Complaint   Patient presents with    T-5000 FALL    Back Pain         Hospital Course  Patient is a 59-year-old male with extensive past medical history of poorly controlled type 2 diabetes mellitus, failure to thrive, and prior suicidal ideations secondary to major depressive disorder as well as his chronic back pain status post multiple laminectomies and fractures who presents due to recurrent falls as well as progressive worsening back pain. Patient was initially held in the emergency department due to suicidal ideation, did not meet any admission criteria, and had progressive worsening of his bilateral lower extremity. Patient reports he chronically has fecal incontinence for the past several months, no other new neurologic symptoms. However, patient does report new onset of acute urinary retention prompting ER provider to order stat MRI of thoracic and lumbar spine due to previous evidence of cord compression.     MRI of thoracic and lumbar showed chronic compression deformity at T12.  Postsurgical and degenerative changes.  No acute fracture.  No significant interval change.    Patient had persistent urinary retention requiring Crowley catheter placement.  Started on tamsulosin.    Patient has difficulty taking care of himself.  He has difficulty managing his diabetes.  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  Also states decreased range of motion and flexibility, dexterity of his bilateral hands which is previous cervical surgery was supposed to fix.  States he has a lot of trouble with glucometer, lancets, using insulin pens.  He was previously switched to oral hypoglycemic agents but still was not under good control and he would still like to be able to check his blood sugar because it dropped severely low previously while he  was at [place].  He apparently started having convulsions and they thought a cardiac arrest.  He also has poor vision making very difficult to do manage his meds and glucometer.  Previously saw optometry and diagnosed with diabetic retinopathy and cataracts.    Was placed on legal hold for suicidal ideations.    Interval Problem Update  Patient was seen and examined at bedside.  I have personally reviewed and interpreted vitals, labs, and imaging.    2/11.  Afebrile intermittent hypotension.  On 1-2 L nasal cannula.  Denies fevers, chills.  Patient states he has a broken syndrome from prior chest compressions and he gets chest pain whenever he takes a deep breath.  Reports chronic back pain which is worse since he fell yesterday.  Reports neuropathy in his hands, decreased dexterity since prior laminectomy, decreased vision making it very hard to use lancets, test strips to check his blood sugars at home.  Reports being compliant with oral blood sugar meds but does not know the names.  Increase insulin sliding scale.  Continue legal hold.  MRI of thoracic and lumbar showed no acute fracture.  This was reviewed with patient.  1/10 A1c 9.8  Procal 0.1  2/12.  Afebrile.  Intermittent hypotension.  On 1-2 L nasal cannula.  Will discontinue scheduled NSAIDs because of bump in kidney function.  Denies any fevers, chills, chest pains, shortness of breath.  He is complaining of not being able to get enough sleep.  Also reports some diarrhea which she states he gets often.  Was having urinary retention requiring Crowley catheter placement yesterday.  Continue tamsulosin.  2/13.  Afebrile.  Intermittent hypotension.  On room air.  Denies fevers, chills.  Complains of chronic back pain.  Patient is depressed.  Discussed with psychiatry.  Increase venlafaxine.  Continue legal hold.  2/14.  Afebrile.  Hypotension.  On room air.  Denies fever, chills.  Complains of chest pain when he takes deep breath.  Reports chronic back and  shoulder pain.  Will attempt voiding trial today.  Continue tamsulosin.  With his difficulty managing insulin pens due to his hands as above I will try to switch him back to oral antihyperglycemic's.  Still on legal hold.  Did not feel much different with increased dose of Effexor  Wbc 4.5 > 5.2 > 3.5 > 3.3 > 4  Hgb 10.7 > 9.9 > 9 > 9.6 > 10.1  Cr 0.91 > 1.23 > 0.84 > 0.67  2/15.  Afebrile.  Intermittent hypotension.  On room air.  Denies fever, chills or chest pains, shortness of breath.  Complains of chronic back and shoulder pain.  Patient did pass voiding trial.  Blood sugars relatively controlled on oral meds.  Still on sliding scale.  Has not noticed much of a difference with his mood.  Continue legal hold  2/16.  Afebrile.  Intermittent hypotension.  On room air.  Denies fevers, chills, chest pains, shortness of breath.  Reports chronic back pain.  States his vision is continuing to get worse.  Pain is has been going on for months but acutely worsened over the past few days.  Denies any eye pain.  I did order a CT orbits.  Patient was previously diagnosed with diabetic retinopathy and cataracts but was unable to follow-up with ophthalmology.  I did discuss case with social work.  Unfortunately patient again started retaining urine and Crowley had to be replaced.  Cannot go to inpatient psych with a Crowley.  He is also very poorly functional and unable to care for himself.  He uses a wheelchair at baseline and inpatient psychiatry may not be able to take care of him with his poor functional status.  He was discharged home with home health and 2/7 and was supposed to get a caregiver but would not agree to payments.  Continue legal hold    I have discussed this patient's plan of care and discharge plan at IDT rounds today with Case Management, Nursing, Nursing leadership, and other members of the IDT team.    Consultants/Specialty  physiatry    Code Status  Full Code    Disposition  The patient is not medically  cleared for discharge to home or a post-acute facility.  Anticipate discharge to: a psychiatric hospital    I have placed the appropriate orders for post-discharge needs.    Review of Systems  Review of Systems   Cardiovascular:  Positive for chest pain.   Genitourinary:         Urinary retention   Musculoskeletal:  Positive for back pain, falls and myalgias.   Psychiatric/Behavioral:  Positive for depression and suicidal ideas. The patient is nervous/anxious.         Physical Exam  Temp:  [36.2 °C (97.2 °F)-36.6 °C (97.9 °F)] 36.6 °C (97.9 °F)  Pulse:  [78-91] 91  Resp:  [15-18] 16  BP: (101-125)/(55-80) 113/80  SpO2:  [92 %-97 %] 92 %    Physical Exam  Vitals and nursing note reviewed.   Constitutional:       Appearance: Normal appearance. He is ill-appearing.   HENT:      Head: Normocephalic and atraumatic.      Nose: Nose normal.      Mouth/Throat:      Mouth: Mucous membranes are moist.      Pharynx: Oropharynx is clear.   Eyes:      Extraocular Movements: Extraocular movements intact.      Conjunctiva/sclera: Conjunctivae normal.   Cardiovascular:      Rate and Rhythm: Normal rate and regular rhythm.      Pulses: Normal pulses.      Heart sounds: Normal heart sounds. No murmur heard.     No friction rub. No gallop.   Pulmonary:      Effort: Pulmonary effort is normal. No respiratory distress.      Breath sounds: Normal breath sounds. No wheezing or rales.   Chest:      Chest wall: No tenderness.   Abdominal:      General: Abdomen is flat. Bowel sounds are normal. There is no distension.      Palpations: Abdomen is soft. There is no mass.      Tenderness: There is no abdominal tenderness. There is no guarding.   Musculoskeletal:         General: Normal range of motion.      Cervical back: Normal range of motion and neck supple.   Skin:     General: Skin is warm.      Capillary Refill: Capillary refill takes less than 2 seconds.   Neurological:      General: No focal deficit present.      Mental Status: He is  alert and oriented to person, place, and time. Mental status is at baseline.      Cranial Nerves: No cranial nerve deficit.      Motor: No weakness.   Psychiatric:         Mood and Affect: Mood is anxious and depressed.         Behavior: Behavior normal.         Fluids    Intake/Output Summary (Last 24 hours) at 2/16/2025 0535  Last data filed at 2/15/2025 0700  Gross per 24 hour   Intake --   Output 500 ml   Net -500 ml        Laboratory  Recent Labs     02/13/25  0548 02/14/25  1047   WBC 3.3* 4.0*   RBC 3.87* 4.07*   HEMOGLOBIN 9.6* 10.1*   HEMATOCRIT 30.4* 32.5*   MCV 78.6* 79.9*   MCH 24.8* 24.8*   MCHC 31.6* 31.1*   RDW 38.4 38.2   PLATELETCT 233 213   MPV 8.7* 9.1     Recent Labs     02/13/25  0548 02/14/25  1047   SODIUM 141 137   POTASSIUM 4.3 4.8   CHLORIDE 105 102   CO2 29 28   GLUCOSE 102* 211*   BUN 25* 23*   CREATININE 0.84 0.67   CALCIUM 8.5 8.9                   Imaging  MR-THORACIC SPINE-W/O   Final Result      1.  Moderate chronic compression deformity at T12.   2.  No acute fracture.   3.  Multifocal degenerative disease as described above.   4.  There has been no significant interval change.      MR-LUMBAR SPINE-W/O   Final Result      1.  Postsurgical and degenerative changes as described above.   2.  Moderate chronic compression deformity at T12.   3.  No acute abnormality.   4.  There has been no significant interval change.      CT-LSPINE W/O PLUS RECONS   Final Result      1. No acute fracture or subluxation involving the lumbar spine.   2. Stable anterior wedge compression deformity of the superior endplate of T12.   3. Stable degenerative grade 1 anterolisthesis of L4 with respect to L3.   4. New postsurgical changes of posterior lateral interbody fusion at L4-5.   5. Horseshoe kidney.   6. Circumferential bladder wall thickening.      CT-TSPINE W/O PLUS RECONS   Final Result      Chronic appearing fractures without a definite acute fracture or change in alignment.      CT-CSPINE WITHOUT  PLUS RECONS   Final Result      1. No acute osseous injury of the cervical spine.   2. Stable postsurgical changes of ACDF from C4 through C7.   3. Stable multilevel cervical spondylosis.      CT-HEAD W/O   Final Result      1. Age-related central and cortical atrophy.   2. No acute intracranial abnormality.                    Assessment/Plan  * Urinary retention- (present on admission)  Assessment & Plan  2/16/2025  Patient with acute urinary retention after fall, given extensive back history concern for potential cord abnormality.  Patient had reaction to Flomax in the past, will try prazosin  - Patient with profuse lightheadedness after Flomax in the past  - Ordered PSA  - Will need repeat postvoid bladder scan  Continue tamsulosin  Requiring Crowley catheter  Attempt voiding trial.    Proliferative diabetic retinopathy of both eyes without macular edema associated with type 2 diabetes mellitus (HCC)  Assessment & Plan  2/16/2025  Complains of worsening vision  Patient Previous saw Soraida Bustamante OD on 10/5/2024.  Exam is uploaded into the media tab.  At that time his right eye visual acuity was 20/40-2, left eye 20/150-1.  Referral was placed to [facility] [place] [facility] for diabetic retinopathy and cataracts but patient has been unable to follow-up.    Patient complained of progressively worsening vision more acutely over the past few days.  He had a CT orbits on 2/16 which was unremarkable    Frequent falls  Assessment & Plan  2/16/2025  Patient with frequent falls due to chronic and progressively worsening bilateral lower extremity weakness with multiple different spinal surgeries.  Patient does report chronic bowel incontinence and recently has developed urinary retention after previous fall with concern for potential cord compression/abnormality.  Lower extremity weakness 4+ out of 5, likely generalized, however asked by ER provider to admit for MRI evaluation  -MRI of thoracic and lumbar spine show chronic  compression fracture at T12, degenerative changes.  No acute fracture  Neurosurgery has recommended for outpatient follow-up and possible epidural injections  - PT/OT in a.m.    Suicidal ideation- (present on admission)  Assessment & Plan  2/16/2025  Currently on legal hold for suicidal ideation due to his current health  - Will need inpatient psychiatry consult in a.m.  Continue legal hold    Acute hypoxic respiratory failure (HCC)- (present on admission)  Assessment & Plan  2/16/2025  Patient requiring 1 to 2 L nasal cannula emergency department, anticipate likely hypoventilation induced versus nocturnal. No pulmonary symptoms to suggest infection at this time  - Continue to monitor  - Incentive spirometer    Uncontrolled type 2 diabetes mellitus with hyperglycemia (HCC)- (present on admission)  Assessment & Plan  2/16/2025  1/10 A1c 9.8  - Insulin sliding scale  -Will provide basal glargine to 15 units daily, will need titration as needed  - Holding home glycemic medications  - Titrate glucose between 140 and 180         VTE prophylaxis: Lovenox    I have performed a physical exam and reviewed and updated ROS and Plan today (2/16/2025). In review of yesterday's note (2/15/2025), there are no changes except as documented above.    Greater than 50 minutes spent prepping to see patient (e.g. review of tests) obtaining and/or reviewing separately obtained history. Performing a medically appropriate examination and/ evaluation.  Counseling and educating the patient/family/caregiver.  Ordering medications, tests, or procedures.  Referring and communicating with other health care professionals.  Documenting clinical information in EPIC.  Independently interpreting results and communicating results to patient/family/caregiver.  Care coordination.

## 2025-02-16 NOTE — DISCHARGE PLANNING
Alert Team/Behavioral Health   Note:      - [facility]: Talked to Lillie. Referral is pending review. No beds currently available.    - [facility]: Called multiples times and no answer. Left voice message.    - [facility] [place]: Talked to Delmar. Referral is pending review. No admissions due to short staffing.    - [facility] : Talked to Lillie. No beds currently available.

## 2025-02-16 NOTE — DISCHARGE PLANNING
Alert Team/Behavioral Health   Note:      [facility] ALERT TEAM DISCHARGE PLANNING NOTE    Date: 2/16/25  Patient Name:  Christoph Taylor - 59 y.o. - Discharge Planning  MRN:  [id number]   YOB: 1965  ADMISSION DATE:  2/10/2025     Writer forwarded referral packet for inpatient psychiatric care to the following community providers: [facility], [facility], [facility], [facility] ,     Items included in the referral packet:   _x____Face Sheet   _x____Pages 1 and 2 of completed legal hold   _x____Alert Team/Psych Assessment   _x____H&P   _x____UDS   _x____Blood Alcohol   _x____Vital signs   _n/a____Pregnancy Test (if applicable)   _x____Medications List   _____Covid Screen

## 2025-02-16 NOTE — CARE PLAN
The patient is Stable - Low risk of patient condition declining or worsening    Shift Goals  Clinical Goals: Pain control, safety, blood glucose monitoring, comfort  Patient Goals: comfort, rest  Family Goals: not present    Progress made toward(s) clinical / shift goals:  Yes      Problem: Pain - Standard  Goal: Alleviation of pain or a reduction in pain to the patient’s comfort goal  Description: Target End Date:  Prior to discharge or change in level of care    Document on Vitals flowsheet    1.  Document pain using the appropriate pain scale per order or unit policy  2.  Educate and implement non-pharmacologic comfort measures (i.e. relaxation, distraction, massage, cold/heat therapy, etc.)  3.  Pain management medications as ordered  4.  Reassess pain after pain med administration per policy  5.  If opiods administered assess patient's response to pain medication is appropriate per POSS sedation scale  6.  Follow pain management plan developed in collaboration with patient and interdisciplinary team (including palliative care or pain specialists if applicable)  Outcome: Progressing     Problem: Knowledge Deficit - Standard  Goal: Patient and family/care givers will demonstrate understanding of plan of care, disease process/condition, diagnostic tests and medications  Description: Target End Date:  1-3 days or as soon as patient condition allows    Document in Patient Education    1.  Patient and family/caregiver oriented to unit, equipment, visitation policy and means for communicating concern  2.  Complete/review Learning Assessment  3.  Assess knowledge level of disease process/condition, treatment plan, diagnostic tests and medications  4.  Explain disease process/condition, treatment plan, diagnostic tests and medications  Outcome: Progressing     Problem: Provide Safe Environment  Goal: Suicide environmental safety, protocols, policies, and practices will be implemented  Description: Target End Date:   resolve day 1    1.  Remove objects or personal belongings that may cause harm or injury to self or others  2.  Dietary tray modifications (paperware)  3.  Provide a safe environment  4.  Render close patient supervision by sustaining observation or awareness of the patient at all times  Outcome: Progressing     Problem: Psychosocial  Goal: Patient's ability to identify and develop effective coping behaviors will improve  Description: Target End Date:  1 to 3 days    1.  Present opportunities for the patient to express thoughts, and feelings in a nonjudgmental environment  2.  Help the patient with problem-solving in a constructive manner.  3.  Educate the patient on cognitive-behavioral self-management responses to suicidal thoughts.  4.  Introduce the use of self-expression methods to manage suicidal feelings  5.  Provide emotional support  6.  Encourage identification of positive aspects of self  Outcome: Progressing  Goal: Patient's ability to identify and utilize available support systems will improve  Description: Target End Date:  1 to 3 days    1.  Help patient identify available resources and support systems  2.  Collaborate with interdisciplinary team  3.  Collaborate with patient, family/caregiver and other support systems  Outcome: Progressing     Problem: Skin Integrity  Goal: Skin integrity is maintained or improved  Description: Target End Date:  Prior to discharge or change in level of care    Document interventions on Skin Risk/Oscar flowsheet groups and corresponding LDA    1.  Assess and monitor skin integrity, appearance and/or temperature  2.  Assess risk factors for impaired skin integrity and/or pressures ulcers  3.  Implement precautions to protect skin integrity in collaboration with interdisciplinary team  4.  Implement pressure ulcer prevention protocol if at risk for skin breakdown  5.  Confirm wound care consult if at risk for skin breakdown  6.  Ensure patient use of pressure relieving  devices  (Low air loss bed, waffle overlay, heel protectors, ROHO cushion, etc)  Outcome: Progressing     Problem: Infection - Standard  Goal: Patient will remain free from infection  Description: Target End Date:  Prior to discharge or change in level of care    1.  Utilize Standard Precautions at all times to reduce the risk of transmission of microorganisms from both recognized and  unrecognized sources of infection  2.  Infection prevention handouts provided (general/device/diagnosis specific) and documented in Patient Education  3.  Educate patient and family/caregiver on isolation precautions if applicable  Outcome: Progressing

## 2025-02-16 NOTE — DOCUMENTATION QUERY
[facility]                                                                       Query Response Note      PATIENT:               BAILEE JACOBSON  ACCT #:                  [id number]  MRN:                     [id number]  :                      1965  ADMIT DATE:       2/10/2025 12:39 PM  DISCH DATE:          RESPONDING  PROVIDER #:        [id number]           QUERY TEXT:    Acute hypoxic respiratory failure is documented in the medical record without any corresponding SOB/respiratory distress/labored breathing. Patient had desat to 76% in ED requiring only 1-2 lpm to maintain sats >90%. Patient has been weaned to room air.    Please provide additional clinical indicators supportive of documented diagnosis of Acute hypoxic respiratory failure    The patient's Clinical Indicators include:  2/10:  -ED note: No respiratory distress, No rales, No rhonchi, No wheezing  -HR 76; RR 16; 76% on RA  -HR 67; RR 13; 98% on 2 lpm  -HR 70; RR 12; 91% on 2 lpm (p/f ratio 214)      -RN note: resp even unlabored  -H&P:  Pulmonary effort is normal. No respiratory distress; Negative for cough, shortness of breath and wheezing.   -HR 76; RR 19; 98 on 2 lpm (p/f ratio 428)  -HR 60; RR 19; 100% on 2 lpm    :  HR 84; RR 16; 94% on 1.5 lpm    Risk Factors: Back pain, headache, urinary retention  Treatments: O2 support 1-2 lpm, incentive spirometer      Contact me with any questions.    Thank you for your time and attention,  Izabel Ratliff RN, [email or web address]  Connect via email, Voalte or messenger.    Note: If you agree with a listed diagnosis, please remember to include it in your concurrent daily documentation and onto the Discharge Summary.  Options provided:   -- Hypoxia only. Acute hypoxic respiratory failure does not exist   -- Acute hypoxic respiratory failure exists, (please document additional clinical indicators)   -- Other explanation,  (please specify other explanation)      Query created by: Izabel Ratliff on 2/13/2025 8:42 AM    RESPONSE TEXT:    Hypoxia only. Acute hypoxic respiratory failure does not exist          Electronically signed by:  MALINI HAAS MD 2/15/2025 4:48 PM

## 2025-02-16 NOTE — CARE PLAN
The patient is Watcher - Medium risk of patient condition declining or worsening    Shift Goals  Clinical Goals: safety, mobility  Patient Goals: rest  Family Goals: not present    Progress made toward(s) clinical / shift goals:  yes    Problem: Pain - Standard  Goal: Alleviation of pain or a reduction in pain to the patient’s comfort goal  Outcome: Progressing     Problem: Knowledge Deficit - Standard  Goal: Patient and family/care givers will demonstrate understanding of plan of care, disease process/condition, diagnostic tests and medications  Outcome: Progressing     Problem: Provide Safe Environment  Goal: Suicide environmental safety, protocols, policies, and practices will be implemented  Outcome: Progressing     Problem: Psychosocial  Goal: Patient's ability to identify and develop effective coping behaviors will improve  Outcome: Progressing  Goal: Patient's ability to identify and utilize available support systems will improve  Outcome: Progressing     Problem: Skin Integrity  Goal: Skin integrity is maintained or improved  Outcome: Progressing     Problem: Infection - Standard  Goal: Patient will remain free from infection  2/16/2025 1318 by Gia Jennings R.N.  Flowsheets (Taken 2/11/2025 1346)  Standard Infection Interventions:   Assessed for signs and symptoms of infection   Implemented standard precautions   Instructed patient/family on signs and symptoms of infection   Provided education on proper hand hygiene and infection prevention measures   Assessed for removal IV, central lines, intra-arterial or urinary catheters  2/16/2025 1318 by Gia Jennings R.N.  Outcome: Progressing

## 2025-02-17 LAB
GLUCOSE BLD STRIP.AUTO-MCNC: 108 MG/DL (ref 65–99)
GLUCOSE BLD STRIP.AUTO-MCNC: 118 MG/DL (ref 65–99)
GLUCOSE BLD STRIP.AUTO-MCNC: 134 MG/DL (ref 65–99)
GLUCOSE BLD STRIP.AUTO-MCNC: 205 MG/DL (ref 65–99)

## 2025-02-17 PROCEDURE — 770001 HCHG ROOM/CARE - MED/SURG/GYN PRIV*

## 2025-02-17 PROCEDURE — 700102 HCHG RX REV CODE 250 W/ 637 OVERRIDE(OP): Performed by: EMERGENCY MEDICINE

## 2025-02-17 PROCEDURE — A9270 NON-COVERED ITEM OR SERVICE: HCPCS | Performed by: INTERNAL MEDICINE

## 2025-02-17 PROCEDURE — 99232 SBSQ HOSP IP/OBS MODERATE 35: CPT | Mod: GC | Performed by: STUDENT IN AN ORGANIZED HEALTH CARE EDUCATION/TRAINING PROGRAM

## 2025-02-17 PROCEDURE — 700102 HCHG RX REV CODE 250 W/ 637 OVERRIDE(OP)

## 2025-02-17 PROCEDURE — A9270 NON-COVERED ITEM OR SERVICE: HCPCS

## 2025-02-17 PROCEDURE — 99233 SBSQ HOSP IP/OBS HIGH 50: CPT | Performed by: INTERNAL MEDICINE

## 2025-02-17 PROCEDURE — 82962 GLUCOSE BLOOD TEST: CPT | Mod: 91

## 2025-02-17 PROCEDURE — A9270 NON-COVERED ITEM OR SERVICE: HCPCS | Performed by: EMERGENCY MEDICINE

## 2025-02-17 PROCEDURE — 700111 HCHG RX REV CODE 636 W/ 250 OVERRIDE (IP): Mod: JZ

## 2025-02-17 PROCEDURE — 700102 HCHG RX REV CODE 250 W/ 637 OVERRIDE(OP): Performed by: INTERNAL MEDICINE

## 2025-02-17 RX ORDER — VITAMIN B COMPLEX
1000 TABLET ORAL DAILY
Status: DISCONTINUED | OUTPATIENT
Start: 2025-02-17 | End: 2025-03-07 | Stop reason: HOSPADM

## 2025-02-17 RX ADMIN — GABAPENTIN 200 MG: 100 CAPSULE ORAL at 12:33

## 2025-02-17 RX ADMIN — OMEPRAZOLE 40 MG: 20 CAPSULE, DELAYED RELEASE ORAL at 05:13

## 2025-02-17 RX ADMIN — PIOGLITAZONE 30 MG: 30 TABLET ORAL at 05:13

## 2025-02-17 RX ADMIN — OXYCODONE AND ACETAMINOPHEN 1 TABLET: 10; 325 TABLET ORAL at 21:10

## 2025-02-17 RX ADMIN — GLIPIZIDE 10 MG: 5 TABLET ORAL at 08:05

## 2025-02-17 RX ADMIN — OXYCODONE AND ACETAMINOPHEN 1 TABLET: 10; 325 TABLET ORAL at 08:04

## 2025-02-17 RX ADMIN — ENOXAPARIN SODIUM 40 MG: 100 INJECTION SUBCUTANEOUS at 17:00

## 2025-02-17 RX ADMIN — TAMSULOSIN HYDROCHLORIDE 0.8 MG: 0.4 CAPSULE ORAL at 08:04

## 2025-02-17 RX ADMIN — VENLAFAXINE HYDROCHLORIDE 150 MG: 37.5 TABLET ORAL at 05:13

## 2025-02-17 RX ADMIN — Medication 1000 UNITS: at 18:11

## 2025-02-17 RX ADMIN — INSULIN LISPRO 4 UNITS: 100 INJECTION, SOLUTION INTRAVENOUS; SUBCUTANEOUS at 22:12

## 2025-02-17 RX ADMIN — GABAPENTIN 200 MG: 100 CAPSULE ORAL at 17:00

## 2025-02-17 RX ADMIN — OXYCODONE AND ACETAMINOPHEN 1 TABLET: 10; 325 TABLET ORAL at 17:03

## 2025-02-17 RX ADMIN — GABAPENTIN 200 MG: 100 CAPSULE ORAL at 05:13

## 2025-02-17 RX ADMIN — SENNOSIDES AND DOCUSATE SODIUM 2 TABLET: 50; 8.6 TABLET ORAL at 17:00

## 2025-02-17 RX ADMIN — SITAGLIPTIN 100 MG: 100 TABLET, FILM COATED ORAL at 05:13

## 2025-02-17 ASSESSMENT — ENCOUNTER SYMPTOMS
NERVOUS/ANXIOUS: 1
BACK PAIN: 1
MYALGIAS: 1
DEPRESSION: 1
FALLS: 1

## 2025-02-17 ASSESSMENT — PAIN DESCRIPTION - PAIN TYPE
TYPE: ACUTE PAIN

## 2025-02-17 NOTE — PROGRESS NOTES
4 Eyes Skin Assessment Completed by Armando RN and PENNIE Cummings.    Head WDL  Ears Back of Ears Redness and Blanching  Nose WDL  Mouth WDL  Neck WDL  Breast/Chest WDL  Shoulder Blades WDL  Spine Old Scar Incision  (R) Arm/Elbow/Hand WDL  (L) Arm/Elbow/Hand Redness  Abdomen WDL  Groin WDL  Scrotum/Coccyx/Buttocks WDL  (R) Leg Redness and Abrasion to Knee  (L) Leg Redness and Abrasion to Knee  (R) Heel/Foot/Toe Redness and Blanching, Dry and Flaky  (L) Heel/Foot/Toe Redness and Blanching, Dry and Flaky          Devices In Places Crowley      Interventions In Place N/A on Legal Hold    Possible Skin Injury No    Pictures Uploaded Into Epic N/A  Wound Consult Placed N/A  RN Wound Prevention Protocol Ordered No

## 2025-02-17 NOTE — ASSESSMENT & PLAN NOTE
Complains of worsening vision  Patient Previous saw Soraida Bustamante OD on 10/5/2024.  Exam is uploaded into the media tab.  At that time his right eye visual acuity was 20/40-2, left eye 20/150-1.  Referral was placed to [facility] for diabetic retinopathy and cataracts but patient has been unable to follow-up.  Patient complained of progressively worsening vision more acutely over the past few days.  He had a CT orbits on 2/16 which was unremarkable  Follow up with outpatient ophthalmologist

## 2025-02-17 NOTE — PROGRESS NOTES
RN got report on pt. Pt's room was checked for safety. Pt is in a paper gown with no strings. Pt has a sitter at bedside. Report was given to sitter.

## 2025-02-17 NOTE — CONSULTS
"PSYCHIATRIC FOLLOW-UP:(established)  *Reason for admission:     Urinary retention [R33.9]  *Legal Hold Status on Admission:          Initiated  Chart reviewed.          *HPI:          Psychiatry last followed up on 2/14/25, and at that time, he was continued on effexor 150 mg daily for MDD. Today he was found lying in bed watching TV news. He denies medication side effects. Has been at this dose for 4 days. He has been watching TV to pass the time.    Mood: depressed  Sleep: sleeping a lot day and night.   Appetite: appetite is okay  Energy:low  Denies HI. Endorses SI, has gun at home.  Denies A/V hallucinations  No delusions    Reviewed nursing noted and spoke with nursing staff. The patient is with medication. The patient has not been aggressive or agitated. The patient is eating ()% of breakfast, () % of lunch and (100mL charted) of dinner as of flow sheet yesterday. The patient has not required psychiatric PRN medications in the last 24 hours.     Medical ROS (as pertinent):     Review of Systems   Constitutional: Negative.    HENT: Negative.     Eyes: Negative.    Respiratory: Negative.     Cardiovascular: Negative.    Gastrointestinal: Negative.    Genitourinary: Negative.    Musculoskeletal:  Positive for back pain and joint pain.   Skin: Negative.    Neurological:  Positive for sensory change.   Endo/Heme/Allergies: Negative.       *Psychiatric Examination:  Vitals:    02/17/25 0749   BP: 128/81   Pulse: 87   Resp: 15   Temp: 36.7 °C (98 °F)   SpO2: 97%     General:   - Grooming and hygiene: Appropriate hygiene and grooming  - Apparent distress: NAD   - Behavior: Calm and appropriate  - Eye Contact: Within normal limits  - no psychomotor agitation or retardation  - Participation: Active verbal participation  Orientation: Grossly oriented to person, place and time  Mood: \"depressed\"  Affect: Congruent, dysthymic  Thought Process: Linear logical and goal directed  Thought Content: Endorsing SI "   Perception: Denies auditory or visual hallucinations. No delusions noted   Attention span and concentration: Intact   Speech: Regular rate, volume and prosody  Language: Appropriate   Insight: Limited  Judgment: Fair  Recent and remote memory: Grossly intact        Current Medications:    Current Facility-Administered Medications:     tamsulosin (Flomax) capsule 0.8 mg, 0.8 mg, Oral, AFTER BREAKFAST, JOSY KimOOlga, 0.8 mg at 02/17/25 0804    glipiZIDE (Glucotrol) tablet 10 mg, 10 mg, Oral, QAM AC, VALENTÍN Kim.O., 10 mg at 02/17/25 0805    pioglitazone (Actos) tablet 30 mg, 30 mg, Oral, DAILY, JOSY KimOOlga, 30 mg at 02/17/25 0513    SITagliptin (Januvia) tablet 100 mg, 100 mg, Oral, DAILY, JOSY KimOOlga, 100 mg at 02/17/25 0513    oxyCODONE-acetaminophen (Percocet) 5-325 MG per tablet 1 Tablet, 1 Tablet, Oral, Q4HRS PRN, JOSY KimO., 1 Tablet at 02/14/25 0913    oxyCODONE-acetaminophen (Percocet-10)  MG per tablet 1 Tablet, 1 Tablet, Oral, Q4HRS PRN, JOSY KimO., 1 Tablet at 02/17/25 0804    venlafaxine (Effexor) tablet 150 mg, 150 mg, Oral, DAILY, Rico Peters D.O., 150 mg at 02/17/25 0513    loperamide (Imodium) capsule 2 mg, 2 mg, Oral, 4X/DAY PRN, JOSY KimOOlga, 2 mg at 02/15/25 0503    gabapentin (Neurontin) capsule 200 mg, 200 mg, Oral, TID, Alyson Foote D.O., 200 mg at 02/17/25 0513    omeprazole (PriLOSEC) capsule 40 mg, 40 mg, Oral, DAILY, JOSY SilvaOOlga, 40 mg at 02/17/25 0513    enoxaparin (Lovenox) inj 40 mg, 40 mg, Subcutaneous, DAILY AT 1800, Anatoliy N Matsunaka, D.O., 40 mg at 02/16/25 1803    senna-docusate (Pericolace Or Senokot S) 8.6-50 MG per tablet 2 Tablet, 2 Tablet, Oral, Q EVENING, 2 Tablet at 02/12/25 1639 **AND** polyethylene glycol/lytes (Miralax) Packet 1 Packet, 1 Packet, Oral, QDAY PRN, Anatoliy Eagle D.O.    Pharmacy Consult Request ...Pain Management Review 1 Each, 1 Each, Other, PHARMACY TO DOSE, Anatoliy TOMAS  [facility] Eagle    ondansetron (Zofran) syringe/vial injection 4 mg, 4 mg, Intravenous, Q4HRS PRN, Rico Peters D.O., 4 mg at 02/11/25 0948    insulin lispro (HumaLOG,AdmeLOG) subcutaneous injection, 3-14 Units, Subcutaneous, 4X/DAY ACHS, 3 Units at 02/16/25 1807 **AND** POC blood glucose manual result, , , Q AC AND BEDTIME(S) **AND** NOTIFY MD and PharmD, , , Once **AND** Administer 20 grams of glucose (approximately 8 ounces of fruit juice) every 15 minutes PRN FSBG less than 70 mg/dL, , , PRN **AND** dextrose 50% (D50W) injection 25 g, 25 g, Intravenous, Q15 MIN PRN, Rico Peters D.O.     Allergies:  Ketamine         Labs personally reviewed:   Lab Results   Component Value Date/Time    SODIUM 137 02/14/2025 10:47 AM    POTASSIUM 4.8 02/14/2025 10:47 AM    CHLORIDE 102 02/14/2025 10:47 AM    CO2 28 02/14/2025 10:47 AM    GLUCOSE 211 (H) 02/14/2025 10:47 AM    BUN 23 (H) 02/14/2025 10:47 AM    CREATININE 0.67 02/14/2025 10:47 AM    BUNCREATRAT 16.7 01/25/2024 07:28 AM      Lab Results   Component Value Date/Time    WBC 4.0 (L) 02/14/2025 10:47 AM    RBC 4.07 (L) 02/14/2025 10:47 AM    HEMOGLOBIN 10.1 (L) 02/14/2025 10:47 AM    HEMATOCRIT 32.5 (L) 02/14/2025 10:47 AM    MCV 79.9 (L) 02/14/2025 10:47 AM    MCH 24.8 (L) 02/14/2025 10:47 AM    MCHC 31.1 (L) 02/14/2025 10:47 AM    MPV 9.1 02/14/2025 10:47 AM    NEUTSPOLYS 55.20 02/12/2025 03:47 AM    LYMPHOCYTES 31.90 02/12/2025 03:47 AM    MONOCYTES 8.30 02/12/2025 03:47 AM    EOSINOPHILS 4.00 02/12/2025 03:47 AM    BASOPHILS 0.30 02/12/2025 03:47 AM                            Latest Reference Range & Units 12/06/24 16:54   TSH 0.380 - 5.330 uIU/mL 1.280      Latest Reference Range & Units 01/10/25 00:08   Vitamin B12 -True Cobalamin 211 - 911 pg/mL 995 (H)   (H): Data is abnormally high   Latest Reference Range & Units 01/10/25 00:08   25-Hydroxy   Vitamin D 25 30 - 100 ng/mL 11 (L)   (L): Data is abnormally low    *EKG:          Results for orders placed or  performed during the hospital encounter of 02/10/25   EKG   Result Value Ref Range     Report           [facility]     Test Date:  2025-02-10  Pt Name:    BAILEE TAYLOR               Department: ER  MRN:        [id number]                      Room:        27  Gender:     Male                         Technician: [id number]  :        1965                   Requested By:ER TRIAGE PROTOCOL  Order #:    [id number]                    Reading MD: MERISSA TORRES DO     Measurements  Intervals                                Axis  Rate:       70                           P:          61  AR:         169                          QRS:        60  QRSD:       91                           T:          70  QT:         420  QTc:        454     Interpretive Statements  Sinus rhythm  Borderline low voltage, extremity leads  Compared to ECG 2025 12:50:26  No significant changes  Electronically Signed On 02- 16:26:09 PST by MERISSA TORRES DO            Assessment:  Bailee Taylor is a 60 yo M with a history of T2DM, failure to thrive and prior SI secondary to MDD and chronic back pain. Psychiatry consulted for SI. Continues to express SI and meet criteria for legal hold at this time. Reports plan to shoot himself with a gun at home. Ongoing depressed mood. Denies AVH. Denies medication adverse effects. Would recommend continuing effexor at current dose for mood. Can consider starting vitamin D supplementation for deficiency.     Dx:  Major depressive disorder, severe     Medical:  Principal Problem:    Urinary retention (POA: Yes)  Active Problems:    Uncontrolled type 2 diabetes mellitus with hyperglycemia (HCC) (POA: Yes)    Acute hypoxic respiratory failure (HCC) (POA: Yes)    Suicidal ideation (POA: Yes)    Frequent falls (POA: Unknown)    Proliferative diabetic retinopathy of both eyes without macular edema associated with type 2 diabetes mellitus (HCC) (POA:  Unknown)  Resolved Problems:    * No resolved hospital problems. *    Plan:  Legal hold: On legal hold  Psychotropic medications  continue Effexor 150 mg daily for major depressive disorder  consider vitamin D supplementation  Please transfer pt to inpatient psychiatric hospital when medically cleared and bed is available  We will need to confirm firearms removed from home prior to discontinuation of legal hold  Psychiatry will follow up     Thank you for the consult.     Legal Hold-extended  Level of observation-one to one  Personal mksie-hi-[place] glasses and wallet  Visitors-no

## 2025-02-17 NOTE — DISCHARGE PLANNING
Alert Team Note     Spoke to Cleopatra at [facility], referral received and pending review. Confirmed pt is still here pending placement.     Spoke to Mandi at [facility], referral received and pending review     Spoke to Sea at [facility], referral received and pending review.     @[id number] contacted Lillie at [facility], declined due to medical complexity, due to his ongoing chest pain and back pain, pt's fecal incontinence and fecal incontinence with diarrhea, urinary retention, congestive heart failure, uncontrolled diabetes,and pt's blindness.Was advised pt is a medical risk for their facility in that pt would not be able to receive appropriate care if something were to happen.

## 2025-02-17 NOTE — PROGRESS NOTES
[facility] Medicine Daily Progress Note    Date of Service  2/17/2025    Chief Complaint  Christoph Taylor is a 59 y.o. male admitted 2/10/2025 with   Chief Complaint   Patient presents with    T-5000 FALL    Back Pain         Hospital Course  Patient is a 59-year-old male with extensive past medical history of poorly controlled type 2 diabetes mellitus, failure to thrive, and prior suicidal ideations secondary to major depressive disorder as well as his chronic back pain status post multiple laminectomies and fractures who presents due to recurrent falls as well as progressive worsening back pain. Patient was initially held in the emergency department due to suicidal ideation, did not meet any admission criteria, and had progressive worsening of his bilateral lower extremity. Patient reports he chronically has fecal incontinence for the past several months, no other new neurologic symptoms. However, patient does report new onset of acute urinary retention prompting ER provider to order stat MRI of thoracic and lumbar spine due to previous evidence of cord compression.     MRI of thoracic and lumbar showed chronic compression deformity at T12.  Postsurgical and degenerative changes.  No acute fracture.  No significant interval change.    Patient had persistent urinary retention requiring Crowley catheter placement.  Started on tamsulosin.    Patient has difficulty taking care of himself.  He has difficulty managing his diabetes.  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  Also states decreased range of motion and flexibility, dexterity of his bilateral hands which is previous cervical surgery was supposed to fix.  States he has a lot of trouble with glucometer, lancets, using insulin pens.  He was previously switched to oral hypoglycemic agents but still was not under good control and he would still like to be able to check his blood sugar because it dropped severely low previously while he  was at [place].  He apparently started having convulsions and they thought a cardiac arrest.  He also has poor vision making very difficult to do manage his meds and glucometer.  Previously saw optometry and diagnosed with diabetic retinopathy and cataracts.    Was placed on legal hold for suicidal ideations.    Interval Problem Update  Patient was seen and examined at bedside.  I have personally reviewed and interpreted vitals, labs, and imaging.    2/11.  Afebrile intermittent hypotension.  On 1-2 L nasal cannula.  Denies fevers, chills.  Patient states he has a broken syndrome from prior chest compressions and he gets chest pain whenever he takes a deep breath.  Reports chronic back pain which is worse since he fell yesterday.  Reports neuropathy in his hands, decreased dexterity since prior laminectomy, decreased vision making it very hard to use lancets, test strips to check his blood sugars at home.  Reports being compliant with oral blood sugar meds but does not know the names.  Increase insulin sliding scale.  Continue legal hold.  MRI of thoracic and lumbar showed no acute fracture.  This was reviewed with patient.  1/10 A1c 9.8  Procal 0.1  2/12.  Afebrile.  Intermittent hypotension.  On 1-2 L nasal cannula.  Will discontinue scheduled NSAIDs because of bump in kidney function.  Denies any fevers, chills, chest pains, shortness of breath.  He is complaining of not being able to get enough sleep.  Also reports some diarrhea which she states he gets often.  Was having urinary retention requiring Crowley catheter placement yesterday.  Continue tamsulosin.  2/13.  Afebrile.  Intermittent hypotension.  On room air.  Denies fevers, chills.  Complains of chronic back pain.  Patient is depressed.  Discussed with psychiatry.  Increase venlafaxine.  Continue legal hold.  2/14.  Afebrile.  Hypotension.  On room air.  Denies fever, chills.  Complains of chest pain when he takes deep breath.  Reports chronic back and  shoulder pain.  Will attempt voiding trial today.  Continue tamsulosin.  With his difficulty managing insulin pens due to his hands as above I will try to switch him back to oral antihyperglycemic's.  Still on legal hold.  Did not feel much different with increased dose of Effexor  Wbc 4.5 > 5.2 > 3.5 > 3.3 > 4  Hgb 10.7 > 9.9 > 9 > 9.6 > 10.1  Cr 0.91 > 1.23 > 0.84 > 0.67  2/15.  Afebrile.  Intermittent hypotension.  On room air.  Denies fever, chills or chest pains, shortness of breath.  Complains of chronic back and shoulder pain.  Patient did pass voiding trial.  Blood sugars relatively controlled on oral meds.  Still on sliding scale.  Has not noticed much of a difference with his mood.  Continue legal hold  2/16.  Afebrile.  Intermittent hypotension.  On room air.  Denies fevers, chills, chest pains, shortness of breath.  Reports chronic back pain.  States his vision is continuing to get worse.  Pain is has been going on for months but acutely worsened over the past few days.  Denies any eye pain.  I did order a CT orbits.  Patient was previously diagnosed with diabetic retinopathy and cataracts but was unable to follow-up with ophthalmology.  I did discuss case with social work.  Unfortunately patient again started retaining urine and Crowley had to be replaced.  Cannot go to inpatient psych with a Crowley.  He is also very poorly functional and unable to care for himself.  He uses a wheelchair at baseline and inpatient psychiatry may not be able to take care of him with his poor functional status.  He was discharged home with home health and 2/7 and was supposed to get a caregiver but would not agree to payments.  Continue legal hold  2/17.  Afebrile.  Stable vitals.  On room air.  Denies fevers, chills, chest pains, shortness of breath.  Mood remains the same.  Still on legal hold.  Discussed this with case management.  Cannot go up to an inpatient psych facility with a Crowley.  Some facilities do take patient's  in a wheelchair.    I did speak with ophthalmology on-call Dr. Kemal Linares.  He states patient is due for another dilated retinal exam.  These are better done in his office where all of his equipment is.  I did speak with them about patient's discharge issues currently being on legal hold and very poor functional status with his back pain, poor use of his hands, decreased vision, wheelchair at baseline.  Dr. Linares states that they think patient can have a good discharge plan he will squeeze him in in his office anytime this or next week.  If he really cannot be discharged he will attempt to do a  retinal exam in the hospital.    I have discussed this patient's plan of care and discharge plan at IDT rounds today with Case Management, Nursing, Nursing leadership, and other members of the IDT team.    Consultants/Specialty  physiatry    Code Status  Full Code    Disposition  The patient is not medically cleared for discharge to home or a post-acute facility.  Anticipate discharge to: a psychiatric hospital    I have placed the appropriate orders for post-discharge needs.    Review of Systems  Review of Systems   Cardiovascular:  Positive for chest pain.   Genitourinary:         Urinary retention   Musculoskeletal:  Positive for back pain, falls and myalgias.   Psychiatric/Behavioral:  Positive for depression and suicidal ideas. The patient is nervous/anxious.         Physical Exam  Temp:  [36.5 °C (97.7 °F)-36.7 °C (98.1 °F)] 36.7 °C (98.1 °F)  Pulse:  [67-93] 93  Resp:  [16-19] 16  BP: ()/(55-80) 123/80  SpO2:  [90 %-100 %] 95 %    Physical Exam  Vitals and nursing note reviewed.   Constitutional:       Appearance: Normal appearance. He is ill-appearing.   HENT:      Head: Normocephalic and atraumatic.      Nose: Nose normal.      Mouth/Throat:      Mouth: Mucous membranes are moist.      Pharynx: Oropharynx is clear.   Eyes:      Extraocular Movements: Extraocular movements intact.      Conjunctiva/sclera:  Conjunctivae normal.   Cardiovascular:      Rate and Rhythm: Normal rate and regular rhythm.      Pulses: Normal pulses.      Heart sounds: Normal heart sounds. No murmur heard.     No friction rub. No gallop.   Pulmonary:      Effort: Pulmonary effort is normal. No respiratory distress.      Breath sounds: Normal breath sounds. No wheezing or rales.   Chest:      Chest wall: No tenderness.   Abdominal:      General: Abdomen is flat. Bowel sounds are normal. There is no distension.      Palpations: Abdomen is soft. There is no mass.      Tenderness: There is no abdominal tenderness. There is no guarding.   Musculoskeletal:         General: Normal range of motion.      Cervical back: Normal range of motion and neck supple.   Skin:     General: Skin is warm.      Capillary Refill: Capillary refill takes less than 2 seconds.   Neurological:      General: No focal deficit present.      Mental Status: He is alert and oriented to person, place, and time. Mental status is at baseline.      Cranial Nerves: No cranial nerve deficit.      Motor: No weakness.   Psychiatric:         Mood and Affect: Mood is anxious and depressed.         Behavior: Behavior normal.         Fluids    Intake/Output Summary (Last 24 hours) at 2/17/2025 0643  Last data filed at 2/17/2025 0424  Gross per 24 hour   Intake --   Output 2800 ml   Net -2800 ml        Laboratory  Recent Labs     02/14/25  1047   WBC 4.0*   RBC 4.07*   HEMOGLOBIN 10.1*   HEMATOCRIT 32.5*   MCV 79.9*   MCH 24.8*   MCHC 31.1*   RDW 38.2   PLATELETCT 213   MPV 9.1     Recent Labs     02/14/25  1047   SODIUM 137   POTASSIUM 4.8   CHLORIDE 102   CO2 28   GLUCOSE 211*   BUN 23*   CREATININE 0.67   CALCIUM 8.9                   Imaging  [id number] WITH   Final Result      1.  Unremarkable orbits.   2.  No facial soft tissue swelling or mass.   3.  Paranasal sinuses are clear.      MR-THORACIC SPINE-W/O   Final Result      1.  Moderate chronic compression deformity at T12.    2.  No acute fracture.   3.  Multifocal degenerative disease as described above.   4.  There has been no significant interval change.      MR-LUMBAR SPINE-W/O   Final Result      1.  Postsurgical and degenerative changes as described above.   2.  Moderate chronic compression deformity at T12.   3.  No acute abnormality.   4.  There has been no significant interval change.      CT-LSPINE W/O PLUS RECONS   Final Result      1. No acute fracture or subluxation involving the lumbar spine.   2. Stable anterior wedge compression deformity of the superior endplate of T12.   3. Stable degenerative grade 1 anterolisthesis of L4 with respect to L3.   4. New postsurgical changes of posterior lateral interbody fusion at L4-5.   5. Horseshoe kidney.   6. Circumferential bladder wall thickening.      CT-TSPINE W/O PLUS RECONS   Final Result      Chronic appearing fractures without a definite acute fracture or change in alignment.      CT-CSPINE WITHOUT PLUS RECONS   Final Result      1. No acute osseous injury of the cervical spine.   2. Stable postsurgical changes of ACDF from C4 through C7.   3. Stable multilevel cervical spondylosis.      CT-HEAD W/O   Final Result      1. Age-related central and cortical atrophy.   2. No acute intracranial abnormality.                    Assessment/Plan  * Urinary retention- (present on admission)  Assessment & Plan  2/17/2025  Patient with acute urinary retention after fall, given extensive back history concern for potential cord abnormality.  Patient had reaction to Flomax in the past, will try prazosin  - Patient with profuse lightheadedness after Flomax in the past  - Ordered PSA  - Will need repeat postvoid bladder scan  Continue tamsulosin  Requiring Crowley catheter  Attempt voiding trial.    Proliferative diabetic retinopathy of both eyes without macular edema associated with type 2 diabetes mellitus (HCC)  Assessment & Plan  2/17/2025  Complains of worsening vision  Patient Previous  saw Soraida Bustamante, OD on 10/5/2024.  Exam is uploaded into the media tab.  At that time his right eye visual acuity was 20/40-2, left eye 20/150-1.  Referral was placed to [facility] for diabetic retinopathy and cataracts but patient has been unable to follow-up.    Patient complained of progressively worsening vision more acutely over the past few days.  He had a CT orbits on 2/16 which was unremarkable    I did speak with ophthalmology on-call Dr. Kemal Linares.  He states patient is due for another dilated retinal exam.  These are better done in his office where all of his equipment is.  I did speak with them about patient's discharge issues currently being on legal hold and very poor functional status with his back pain, poor use of his hands, decreased vision, wheelchair at baseline.  Dr. Linares states that they think patient can have a good discharge plan he will squeeze him in in his office anytime this or next week.  If he really cannot be discharged he will attempt to do a  retinal exam in the hospital.    Frequent falls  Assessment & Plan  2/17/2025  Patient with frequent falls due to chronic and progressively worsening bilateral lower extremity weakness with multiple different spinal surgeries.  Patient does report chronic bowel incontinence and recently has developed urinary retention after previous fall with concern for potential cord compression/abnormality.  Lower extremity weakness 4+ out of 5, likely generalized, however asked by ER provider to admit for MRI evaluation  -MRI of thoracic and lumbar spine show chronic compression fracture at T12, degenerative changes.  No acute fracture  Neurosurgery has recommended for outpatient follow-up and possible epidural injections  - PT/OT in a.m.    Suicidal ideation- (present on admission)  Assessment & Plan  2/17/2025  Currently on legal hold for suicidal ideation due to his current health  - Will need inpatient psychiatry consult in  a.m.  Continue legal hold    Acute hypoxic respiratory failure (HCC)- (present on admission)  Assessment & Plan  2/17/2025  Patient requiring 1 to 2 L nasal cannula emergency department, anticipate likely hypoventilation induced versus nocturnal. No pulmonary symptoms to suggest infection at this time  - Continue to monitor  - Incentive spirometer    Uncontrolled type 2 diabetes mellitus with hyperglycemia (HCC)- (present on admission)  Assessment & Plan  2/17/2025  1/10 A1c 9.8  - Insulin sliding scale  -Will provide basal glargine to 15 units daily, will need titration as needed  - Holding home glycemic medications  - Titrate glucose between 140 and 180         VTE prophylaxis: Lovenox    I have performed a physical exam and reviewed and updated ROS and Plan today (2/17/2025). In review of yesterday's note (2/16/2025), there are no changes except as documented above.    Greater than 51 minutes spent prepping to see patient (e.g. review of tests) obtaining and/or reviewing separately obtained history. Performing a medically appropriate examination and/ evaluation.  Counseling and educating the patient/family/caregiver.  Ordering medications, tests, or procedures.  Referring and communicating with other health care professionals.  Documenting clinical information in EPIC.  Independently interpreting results and communicating results to patient/family/caregiver.  Care coordination.

## 2025-02-17 NOTE — CARE PLAN
The patient is Stable - Low risk of patient condition declining or worsening    Shift Goals  Clinical Goals: Safety, mobility, comfort  Patient Goals: rest  Family Goals: not present    Progress made toward(s) clinical / shift goals:  Yes      Problem: Pain - Standard  Goal: Alleviation of pain or a reduction in pain to the patient’s comfort goal  Description: Target End Date:  Prior to discharge or change in level of care    Document on Vitals flowsheet    1.  Document pain using the appropriate pain scale per order or unit policy  2.  Educate and implement non-pharmacologic comfort measures (i.e. relaxation, distraction, massage, cold/heat therapy, etc.)  3.  Pain management medications as ordered  4.  Reassess pain after pain med administration per policy  5.  If opiods administered assess patient's response to pain medication is appropriate per POSS sedation scale  6.  Follow pain management plan developed in collaboration with patient and interdisciplinary team (including palliative care or pain specialists if applicable)  Outcome: Progressing     Problem: Knowledge Deficit - Standard  Goal: Patient and family/care givers will demonstrate understanding of plan of care, disease process/condition, diagnostic tests and medications  Description: Target End Date:  1-3 days or as soon as patient condition allows    Document in Patient Education    1.  Patient and family/caregiver oriented to unit, equipment, visitation policy and means for communicating concern  2.  Complete/review Learning Assessment  3.  Assess knowledge level of disease process/condition, treatment plan, diagnostic tests and medications  4.  Explain disease process/condition, treatment plan, diagnostic tests and medications  Outcome: Progressing     Problem: Provide Safe Environment  Goal: Suicide environmental safety, protocols, policies, and practices will be implemented  Description: Target End Date:  resolve day 1    1.  Remove objects or  personal belongings that may cause harm or injury to self or others  2.  Dietary tray modifications (paperware)  3.  Provide a safe environment  4.  Render close patient supervision by sustaining observation or awareness of the patient at all times  Outcome: Progressing     Problem: Psychosocial  Goal: Patient's ability to identify and develop effective coping behaviors will improve  Description: Target End Date:  1 to 3 days    1.  Present opportunities for the patient to express thoughts, and feelings in a nonjudgmental environment  2.  Help the patient with problem-solving in a constructive manner.  3.  Educate the patient on cognitive-behavioral self-management responses to suicidal thoughts.  4.  Introduce the use of self-expression methods to manage suicidal feelings  5.  Provide emotional support  6.  Encourage identification of positive aspects of self  Outcome: Progressing  Goal: Patient's ability to identify and utilize available support systems will improve  Description: Target End Date:  1 to 3 days    1.  Help patient identify available resources and support systems  2.  Collaborate with interdisciplinary team  3.  Collaborate with patient, family/caregiver and other support systems  Outcome: Progressing     Problem: Skin Integrity  Goal: Skin integrity is maintained or improved  Description: Target End Date:  Prior to discharge or change in level of care    Document interventions on Skin Risk/Oscar flowsheet groups and corresponding LDA    1.  Assess and monitor skin integrity, appearance and/or temperature  2.  Assess risk factors for impaired skin integrity and/or pressures ulcers  3.  Implement precautions to protect skin integrity in collaboration with interdisciplinary team  4.  Implement pressure ulcer prevention protocol if at risk for skin breakdown  5.  Confirm wound care consult if at risk for skin breakdown  6.  Ensure patient use of pressure relieving devices  (Low air loss bed, waffle  overlay, heel protectors, ROHO cushion, etc)  Outcome: Progressing     Problem: Infection - Standard  Goal: Patient will remain free from infection  Description: Target End Date:  Prior to discharge or change in level of care    1.  Utilize Standard Precautions at all times to reduce the risk of transmission of microorganisms from both recognized and  unrecognized sources of infection  2.  Infection prevention handouts provided (general/device/diagnosis specific) and documented in Patient Education  3.  Educate patient and family/caregiver on isolation precautions if applicable  Outcome: Progressing

## 2025-02-17 NOTE — PROGRESS NOTES
4 Eyes Skin Assessment Completed by PENNIE Barahona and PENNIE Capone.    Head WDL  Ears WDL  Nose WDL  Mouth WDL  Neck WDL  Breast/Chest WDL  Shoulder Blades WDL  Spine Redness and Blanching  (R) Arm/Elbow/Hand Redness and Blanching  (L) Arm/Elbow/Hand Redness and Blanching  Abdomen WDL  Groin WDL  Scrotum/Coccyx/Buttocks Redness and Blanching(slow to fadi)  (R) Leg Wound/scab to bilateral knee are. Dressing clean, dry, and intact  (L) Leg wound/scab to knee area. Dressing is clean,dry, and intact  (R) Heel/Foot/Toe Redness and Blanching  (L) Heel/Foot/Toe Redness and Blanching          Devices In Places jiménez      Interventions In Place Pillows, Q2 Turns, and Heels Loaded W/Pillows. Wedges    Possible Skin Injury Yes    Pictures Uploaded Into Epic Will upload.  Wound Consult Placed N/A  RN Wound Prevention Protocol Ordered No

## 2025-02-18 PROBLEM — I95.1 ORTHOSTATIC HYPOTENSION: Status: ACTIVE | Noted: 2025-02-18

## 2025-02-18 PROBLEM — G89.29 CHRONIC MIDLINE BACK PAIN: Status: ACTIVE | Noted: 2025-02-18

## 2025-02-18 PROBLEM — M54.9 CHRONIC MIDLINE BACK PAIN: Status: ACTIVE | Noted: 2025-02-18

## 2025-02-18 LAB
GLUCOSE BLD STRIP.AUTO-MCNC: 136 MG/DL (ref 65–99)
GLUCOSE BLD STRIP.AUTO-MCNC: 141 MG/DL (ref 65–99)

## 2025-02-18 PROCEDURE — A9270 NON-COVERED ITEM OR SERVICE: HCPCS | Performed by: INTERNAL MEDICINE

## 2025-02-18 PROCEDURE — 700102 HCHG RX REV CODE 250 W/ 637 OVERRIDE(OP): Performed by: STUDENT IN AN ORGANIZED HEALTH CARE EDUCATION/TRAINING PROGRAM

## 2025-02-18 PROCEDURE — A9270 NON-COVERED ITEM OR SERVICE: HCPCS | Performed by: STUDENT IN AN ORGANIZED HEALTH CARE EDUCATION/TRAINING PROGRAM

## 2025-02-18 PROCEDURE — 51798 US URINE CAPACITY MEASURE: CPT

## 2025-02-18 PROCEDURE — 700102 HCHG RX REV CODE 250 W/ 637 OVERRIDE(OP)

## 2025-02-18 PROCEDURE — 99232 SBSQ HOSP IP/OBS MODERATE 35: CPT | Performed by: STUDENT IN AN ORGANIZED HEALTH CARE EDUCATION/TRAINING PROGRAM

## 2025-02-18 PROCEDURE — 700111 HCHG RX REV CODE 636 W/ 250 OVERRIDE (IP): Mod: JZ

## 2025-02-18 PROCEDURE — 82962 GLUCOSE BLOOD TEST: CPT

## 2025-02-18 PROCEDURE — 700102 HCHG RX REV CODE 250 W/ 637 OVERRIDE(OP): Performed by: INTERNAL MEDICINE

## 2025-02-18 PROCEDURE — A9270 NON-COVERED ITEM OR SERVICE: HCPCS | Performed by: EMERGENCY MEDICINE

## 2025-02-18 PROCEDURE — A9270 NON-COVERED ITEM OR SERVICE: HCPCS

## 2025-02-18 PROCEDURE — 770001 HCHG ROOM/CARE - MED/SURG/GYN PRIV*

## 2025-02-18 PROCEDURE — 97530 THERAPEUTIC ACTIVITIES: CPT | Mod: CQ

## 2025-02-18 PROCEDURE — 700102 HCHG RX REV CODE 250 W/ 637 OVERRIDE(OP): Performed by: EMERGENCY MEDICINE

## 2025-02-18 PROCEDURE — 700105 HCHG RX REV CODE 258: Performed by: STUDENT IN AN ORGANIZED HEALTH CARE EDUCATION/TRAINING PROGRAM

## 2025-02-18 PROCEDURE — 99232 SBSQ HOSP IP/OBS MODERATE 35: CPT | Mod: GC | Performed by: STUDENT IN AN ORGANIZED HEALTH CARE EDUCATION/TRAINING PROGRAM

## 2025-02-18 PROCEDURE — 700101 HCHG RX REV CODE 250: Performed by: STUDENT IN AN ORGANIZED HEALTH CARE EDUCATION/TRAINING PROGRAM

## 2025-02-18 RX ORDER — MELOXICAM 7.5 MG/1
15 TABLET ORAL DAILY
Status: DISCONTINUED | OUTPATIENT
Start: 2025-02-18 | End: 2025-03-07 | Stop reason: HOSPADM

## 2025-02-18 RX ORDER — GABAPENTIN 300 MG/1
300 CAPSULE ORAL 3 TIMES DAILY
Status: DISCONTINUED | OUTPATIENT
Start: 2025-02-18 | End: 2025-02-19

## 2025-02-18 RX ORDER — METHYLPHENIDATE HYDROCHLORIDE 5 MG/1
5 TABLET ORAL 2 TIMES DAILY
Refills: 0 | Status: DISCONTINUED | OUTPATIENT
Start: 2025-02-19 | End: 2025-02-21

## 2025-02-18 RX ORDER — VENLAFAXINE HYDROCHLORIDE 75 MG/1
150 CAPSULE, EXTENDED RELEASE ORAL
Status: DISCONTINUED | OUTPATIENT
Start: 2025-02-19 | End: 2025-02-21

## 2025-02-18 RX ORDER — TAMSULOSIN HYDROCHLORIDE 0.4 MG/1
0.4 CAPSULE ORAL
Status: DISCONTINUED | OUTPATIENT
Start: 2025-02-19 | End: 2025-02-20

## 2025-02-18 RX ORDER — LIDOCAINE 4 G/G
2 PATCH TOPICAL EVERY 24 HOURS
Status: DISCONTINUED | OUTPATIENT
Start: 2025-02-18 | End: 2025-03-07 | Stop reason: HOSPADM

## 2025-02-18 RX ORDER — DULOXETIN HYDROCHLORIDE 30 MG/1
30 CAPSULE, DELAYED RELEASE ORAL DAILY
Status: DISCONTINUED | OUTPATIENT
Start: 2025-02-18 | End: 2025-02-18

## 2025-02-18 RX ORDER — METHYLPHENIDATE HYDROCHLORIDE 5 MG/1
5 TABLET ORAL 2 TIMES DAILY
Refills: 0 | Status: DISCONTINUED | OUTPATIENT
Start: 2025-02-18 | End: 2025-02-18

## 2025-02-18 RX ORDER — METHOCARBAMOL 750 MG/1
750 TABLET, FILM COATED ORAL 4 TIMES DAILY
Status: DISCONTINUED | OUTPATIENT
Start: 2025-02-18 | End: 2025-03-07 | Stop reason: HOSPADM

## 2025-02-18 RX ORDER — SODIUM CHLORIDE 9 MG/ML
1000 INJECTION, SOLUTION INTRAVENOUS ONCE
Status: COMPLETED | OUTPATIENT
Start: 2025-02-18 | End: 2025-02-18

## 2025-02-18 RX ADMIN — TAMSULOSIN HYDROCHLORIDE 0.8 MG: 0.4 CAPSULE ORAL at 08:47

## 2025-02-18 RX ADMIN — MELOXICAM 15 MG: 7.5 TABLET ORAL at 13:06

## 2025-02-18 RX ADMIN — GABAPENTIN 200 MG: 100 CAPSULE ORAL at 05:45

## 2025-02-18 RX ADMIN — METHOCARBAMOL TABLETS 750 MG: 750 TABLET, COATED ORAL at 22:14

## 2025-02-18 RX ADMIN — OXYCODONE AND ACETAMINOPHEN 1 TABLET: 10; 325 TABLET ORAL at 05:45

## 2025-02-18 RX ADMIN — GABAPENTIN 300 MG: 300 CAPSULE ORAL at 18:00

## 2025-02-18 RX ADMIN — GABAPENTIN 200 MG: 100 CAPSULE ORAL at 11:29

## 2025-02-18 RX ADMIN — PIOGLITAZONE 30 MG: 30 TABLET ORAL at 05:44

## 2025-02-18 RX ADMIN — SODIUM CHLORIDE 1000 ML: 9 INJECTION, SOLUTION INTRAVENOUS at 18:12

## 2025-02-18 RX ADMIN — SITAGLIPTIN 100 MG: 100 TABLET, FILM COATED ORAL at 05:44

## 2025-02-18 RX ADMIN — LOPERAMIDE HYDROCHLORIDE 2 MG: 2 CAPSULE ORAL at 23:08

## 2025-02-18 RX ADMIN — OXYCODONE AND ACETAMINOPHEN 1 TABLET: 10; 325 TABLET ORAL at 01:34

## 2025-02-18 RX ADMIN — OMEPRAZOLE 40 MG: 20 CAPSULE, DELAYED RELEASE ORAL at 05:44

## 2025-02-18 RX ADMIN — OXYCODONE AND ACETAMINOPHEN 1 TABLET: 10; 325 TABLET ORAL at 11:29

## 2025-02-18 RX ADMIN — METHOCARBAMOL TABLETS 750 MG: 750 TABLET, COATED ORAL at 13:07

## 2025-02-18 RX ADMIN — Medication 1000 UNITS: at 05:45

## 2025-02-18 RX ADMIN — LIDOCAINE 2 PATCH: 4 PATCH TOPICAL at 13:06

## 2025-02-18 RX ADMIN — VENLAFAXINE HYDROCHLORIDE 150 MG: 37.5 TABLET ORAL at 05:45

## 2025-02-18 RX ADMIN — ENOXAPARIN SODIUM 40 MG: 100 INJECTION SUBCUTANEOUS at 18:01

## 2025-02-18 RX ADMIN — GLIPIZIDE 10 MG: 5 TABLET ORAL at 08:47

## 2025-02-18 RX ADMIN — GABAPENTIN 300 MG: 300 CAPSULE ORAL at 13:07

## 2025-02-18 RX ADMIN — METHOCARBAMOL TABLETS 750 MG: 750 TABLET, COATED ORAL at 18:01

## 2025-02-18 ASSESSMENT — GAIT ASSESSMENTS
GAIT LEVEL OF ASSIST: MINIMAL ASSIST
DISTANCE (FEET): 10
ASSISTIVE DEVICE: FRONT WHEEL WALKER

## 2025-02-18 ASSESSMENT — ENCOUNTER SYMPTOMS
BACK PAIN: 1
NAUSEA: 0
VOMITING: 0
DEPRESSION: 1

## 2025-02-18 ASSESSMENT — COGNITIVE AND FUNCTIONAL STATUS - GENERAL
TURNING FROM BACK TO SIDE WHILE IN FLAT BAD: A LITTLE
CLIMB 3 TO 5 STEPS WITH RAILING: A LOT
SUGGESTED CMS G CODE MODIFIER MOBILITY: CK
MOVING FROM LYING ON BACK TO SITTING ON SIDE OF FLAT BED: A LITTLE
WALKING IN HOSPITAL ROOM: A LITTLE
STANDING UP FROM CHAIR USING ARMS: A LITTLE
MOVING TO AND FROM BED TO CHAIR: A LITTLE
MOBILITY SCORE: 17

## 2025-02-18 ASSESSMENT — PAIN DESCRIPTION - PAIN TYPE
TYPE: CHRONIC PAIN
TYPE: ACUTE PAIN

## 2025-02-18 NOTE — WOUND TEAM
[facility]  Inpatient Services  Wound and Skin Care Brief Evaluation    Admission Date: 2/10/2025     Last order of IP CONSULT TO WOUND CARE was found on 2/17/2025 from Hospital Encounter on 2/10/2025     HPI, PMH, SH: Reviewed    Chief Complaint   Patient presents with    T-5000 FALL    Back Pain     Diagnosis: Urinary retention [R33.9]    Unit where seen by Wound Team: T337/01     Wound consult placed regarding sacrum and buttocks. Chart and images reviewed. This discussed with bedside RN Rachel. This clinician in to assess patient. Patient pleasant and agreeable. Patient mid sacral area with small partial thickness skin tear. Non-selectively debrided with Perineal Wipes (Barrier wipes). Barrier paste applied for protective layer. No wounds visualized to patient buttocks.    No pressure injuries or advanced wound care needs identified. Wound consult completed. No further follow up unless indicated and consulted.          PREVENTATIVE INTERVENTIONS:    Q shift Oscar - performed per nursing policy  Q shift pressure point assessments - performed per nursing policy    Surface/Positioning  Standard/trauma mattress - Currently in Place    Containment/Moisture Prevention    Barrier wipes - Currently in Place  Barrier paste - Applied this Visit

## 2025-02-18 NOTE — DISCHARGE PLANNING
Alert Team Note     Spoke to Maddy at [facility] requesting additional information regarding ADLS, current behaviors, urinary retention and insurance.    Consulted with Dr. Houston and RAQUEL Ansari and was advised a jiménez is in place, pt is not independent and requires moderate assistance with ADL's. Pt is mostly wheelchair bound.     Notified [facility], faxed updated notes and insurance information.    [phone] Spoke to Paula at [facility]. Pt declined, was advised to send a new referral once pt improves and no longer has a jiménez.

## 2025-02-18 NOTE — CARE PLAN
Problem: Pain - Standard  Goal: Alleviation of pain or a reduction in pain to the patient’s comfort goal  Outcome: Progressing     Problem: Provide Safe Environment  Goal: Suicide environmental safety, protocols, policies, and practices will be implemented  Outcome: Progressing       The patient is Stable - Low risk of patient condition declining or worsening    Shift Goals  Clinical Goals: Safety, pain control  Patient Goals: Comfort  Family Goals: not present    Progress made toward(s) clinical / shift goals:  Taught pt 0-10 pain scale and non-pharmacological method of pain management, encouraged to verbalize when in pain. Administered PRN pain meds as needed. 1:1 safety sitter at bedside. Bed locked and in lowest position. Safety and fall precautions, in place. Safety checklist and disposable paper gown in place. Hourly rounding. Call light and belongings within reach.     Patient is not progressing towards the following goals:

## 2025-02-18 NOTE — CARE PLAN
The patient is Watcher - Medium risk of patient condition declining or worsening    Shift Goals  Clinical Goals: Safety, mobility, comfort  Patient Goals: rest  Family Goals: not present    Progress made toward(s) clinical / shift goals:      Problem: Pain - Standard  Goal: Alleviation of pain or a reduction in pain to the patient’s comfort goal  Outcome: Progressing     Problem: Knowledge Deficit - Standard  Goal: Patient and family/care givers will demonstrate understanding of plan of care, disease process/condition, diagnostic tests and medications  Outcome: Progressing     Problem: Provide Safe Environment  Goal: Suicide environmental safety, protocols, policies, and practices will be implemented  Outcome: Progressing     Problem: Psychosocial  Goal: Patient's ability to identify and develop effective coping behaviors will improve  Outcome: Progressing     Problem: Skin Integrity  Goal: Skin integrity is maintained or improved  Outcome: Progressing     Problem: Infection - Standard  Goal: Patient will remain free from infection  Outcome: Progressing       Patient is not progressing towards the following goals:

## 2025-02-18 NOTE — CARE PLAN
Problem: Pain - Standard  Goal: Alleviation of pain or a reduction in pain to the patient’s comfort goal  Outcome: Progressing     Problem: Knowledge Deficit - Standard  Goal: Patient and family/care givers will demonstrate understanding of plan of care, disease process/condition, diagnostic tests and medications  Outcome: Progressing     Problem: Provide Safe Environment  Goal: Suicide environmental safety, protocols, policies, and practices will be implemented  Outcome: Progressing  Pt's room was checked for safety     Problem: Psychosocial  Goal: Patient's ability to identify and develop effective coping behaviors will improve  Outcome: Progressing  Goal: Patient's ability to identify and utilize available support systems will improve  Outcome: Progressing     Problem: Skin Integrity  Goal: Skin integrity is maintained or improved  Outcome: Progressing  Pt was encouraged to reposition throughout the day. Wedges are now in place to help with repositioning      Problem: Psychosocial  Goal: Patient's ability to identify and develop effective coping behaviors will improve  Outcome: Progressing  Goal: Patient's ability to identify and utilize available support systems will improve  Outcome: Progressing     Problem: Infection - Standard  Goal: Patient will remain free from infection  Outcome: Progressing   The patient is Stable - Low risk of patient condition declining or worsening    Shift Goals  Clinical Goals: Safety, mobility, comfort  Patient Goals: rest  Family Goals: not present    Progress made toward(s) clinical / shift goals:    Pt has sitter on bedside and remains on legal hold.    Patient is not progressing towards the following goals:

## 2025-02-18 NOTE — DISCHARGE PLANNING
Alert Team Note     Spoke to Maddy at [facility], confirmed pt is still here pending placement. Referral pending.     Attempted to contact [facility], no answer. Left confidential VM with call back info provided.     @[id number] Spoke to Sea at [facility], referral received and pending.

## 2025-02-18 NOTE — PROGRESS NOTES
[facility] Medicine Daily Progress Note    Date of Service  2/18/2025    Chief Complaint  Christoph Taylor is a 59 y.o. male admitted 2/10/2025 with   Chief Complaint   Patient presents with    T-5000 FALL    Back Pain         Hospital Course  Patient is a 59-year-old male with extensive past medical history of poorly controlled type 2 diabetes mellitus, failure to thrive, and prior suicidal ideations secondary to major depressive disorder as well as his chronic back pain status post multiple laminectomies and fractures who presents due to recurrent falls as well as progressive worsening back pain. Patient was initially held in the emergency department due to suicidal ideation, did not meet any admission criteria, and had progressive worsening of his bilateral lower extremity. Patient reports he chronically has fecal incontinence for the past several months, no other new neurologic symptoms. However, patient does report new onset of acute urinary retention prompting ER provider to order stat MRI of thoracic and lumbar spine due to previous evidence of cord compression.     MRI of thoracic and lumbar showed chronic compression deformity at T12.  Postsurgical and degenerative changes.  No acute fracture.  No significant interval change.    Patient had persistent urinary retention requiring Crowley catheter placement.  Started on tamsulosin.    Patient has difficulty taking care of himself.  He has difficulty managing his diabetes.  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  Also states decreased range of motion and flexibility, dexterity of his bilateral hands which is previous cervical surgery was supposed to fix.  States he has a lot of trouble with glucometer, lancets, using insulin pens.  He was previously switched to oral hypoglycemic agents but still was not under good control and he would still like to be able to check his blood sugar because it dropped severely low previously while he  was at [facility].  He apparently started having convulsions and they thought a cardiac arrest.  He also has poor vision making very difficult to do manage his meds and glucometer.  Previously saw optometry and diagnosed with diabetic retinopathy and cataracts.    Was placed on legal hold for suicidal ideations.    Interval Problem Update    HENRI ON  He has ongoing back pain.  He is hoping someone will review his MRI with Dr. Pulido.  He is depressed.  Agreeable to transition off of opiates due to urinary retention, chronic pain, and mood DO.  Trial of jiménez discontinuation today.    Ambulate with PT today and developed orthostatic hypotension. Decreased     POC discussed with neurosurgeon Dr. Pulido. No operative intervention warranted.    POC discussed with psychiatry Dr. Fernandez. Will initiate methylphenidate tomorrow after assessing response to pain management changes.    No new labs nor imaging.    I have discussed this patient's plan of care and discharge plan at IDT rounds today with Case Management, Nursing, Nursing leadership, and other members of the IDT team.    Consultants/Specialty  psychiatry    Code Status  Full Code    Disposition  The patient is not medically cleared for discharge to home or a post-acute facility.  Anticipate discharge to: a psychiatric hospital    I have placed the appropriate orders for post-discharge needs.    Review of Systems  Review of Systems   Gastrointestinal:  Negative for nausea and vomiting.   Genitourinary:  Negative for dysuria and urgency.   Musculoskeletal:  Positive for back pain and joint pain.   Psychiatric/Behavioral:  Positive for depression.         Physical Exam  Temp:  [36.3 °C (97.4 °F)-36.9 °C (98.5 °F)] 36.6 °C (97.8 °F)  Pulse:  [78-88] 78  Resp:  [16-19] 18  BP: (114-127)/(70-79) 120/76  SpO2:  [90 %-96 %] 90 %    Physical Exam  Vitals and nursing note reviewed. Exam conducted with a chaperone present (Sitter at bedside).   Constitutional:       General: He  is not in acute distress.     Appearance: Normal appearance. He is not ill-appearing, toxic-appearing or diaphoretic.   HENT:      Head: Normocephalic.      Ears:      Comments: +Presbycusis     Nose: Nose normal.      Mouth/Throat:      Mouth: Mucous membranes are moist.   Eyes:      General: No scleral icterus.     Conjunctiva/sclera: Conjunctivae normal.   Pulmonary:      Effort: Pulmonary effort is normal. No respiratory distress.   Genitourinary:     Comments: +Crowley. Urine yellow / clear.  Skin:     General: Skin is warm and dry.   Neurological:      Mental Status: He is alert.      Comments: Appropriately conversant, moves all extremities   Psychiatric:         Attention and Perception: Attention and perception normal.         Mood and Affect: Affect normal. Mood is depressed.         Speech: Speech normal.         Behavior: Behavior is withdrawn. Behavior is cooperative.         Thought Content: Thought content normal.         Cognition and Memory: Cognition and memory normal.         Judgment: Judgment normal.         Fluids    Intake/Output Summary (Last 24 hours) at 2/18/2025 1531  Last data filed at 2/18/2025 1329  Gross per 24 hour   Intake 960 ml   Output 1450 ml   Net -490 ml        Laboratory                            Imaging  [id number]   Final Result      1.  Unremarkable orbits.   2.  No facial soft tissue swelling or mass.   3.  Paranasal sinuses are clear.      MR-THORACIC SPINE-W/O   Final Result      1.  Moderate chronic compression deformity at T12.   2.  No acute fracture.   3.  Multifocal degenerative disease as described above.   4.  There has been no significant interval change.      MR-LUMBAR SPINE-W/O   Final Result      1.  Postsurgical and degenerative changes as described above.   2.  Moderate chronic compression deformity at T12.   3.  No acute abnormality.   4.  There has been no significant interval change.      CT-LSPINE W/O PLUS RECONS   Final Result      1. No acute  fracture or subluxation involving the lumbar spine.   2. Stable anterior wedge compression deformity of the superior endplate of T12.   3. Stable degenerative grade 1 anterolisthesis of L4 with respect to L3.   4. New postsurgical changes of posterior lateral interbody fusion at L4-5.   5. Horseshoe kidney.   6. Circumferential bladder wall thickening.      CT-TSPINE W/O PLUS RECONS   Final Result      Chronic appearing fractures without a definite acute fracture or change in alignment.      CT-CSPINE WITHOUT PLUS RECONS   Final Result      1. No acute osseous injury of the cervical spine.   2. Stable postsurgical changes of ACDF from C4 through C7.   3. Stable multilevel cervical spondylosis.      CT-HEAD W/O   Final Result      1. Age-related central and cortical atrophy.   2. No acute intracranial abnormality.                    Assessment/Plan  * Urinary retention- (present on admission)  Assessment & Plan  Patient with acute urinary retention after fall, given extensive back history concern for potential cord abnormality.    Continue tamsulosin  Attempt voiding trial 2/18/25    Chronic midline back pain- (present on admission)  Assessment & Plan  MRI demonstrates chronic compression fracture and degenerative changes - nonoperative management  Opiates contraindicated due to urinary retention, chronic pain, and mood DO  Multimodal regimen:  Increased gabapentin to 300 mg TID  Scheduled NSAID - meloxicam QD  SNRI - venlafaxine  Scheduled Muscle relaxant - robaxin QID  Lidoderm topical therapy  Warm compress  If refractory, can initiate short course of steroids    Orthostatic hypotension  Assessment & Plan  Likely exacerbated by high-dose tamsulosin  Decreased tamsulosin to 0.4 mg daily  1 L NS bolus   Reassess orthostatics in the morning    Proliferative diabetic retinopathy of both eyes without macular edema associated with type 2 diabetes mellitus (HCC)- (present on admission)  Assessment & Plan  Complains of  worsening vision  Patient Previous saw Soraida Bustamante, OD on 10/5/2024.  Exam is uploaded into the media tab.  At that time his right eye visual acuity was 20/40-2, left eye 20/150-1.  Referral was placed to [facility] for diabetic retinopathy and cataracts but patient has been unable to follow-up.  Patient complained of progressively worsening vision more acutely over the past few days.  He had a CT orbits on 2/16 which was unremarkable  Follow up with outpatient ophthalmologist    Frequent falls- (present on admission)  Assessment & Plan  Patient with frequent falls due to chronic and progressively worsening bilateral lower extremity weakness with multiple different spinal surgeries.  Patient does report chronic bowel incontinence and recently has developed urinary retention after previous fall with concern for potential cord compression/abnormality.  Lower extremity weakness 4+ out of 5, likely generalized, however asked by ER provider to admit for MRI evaluation  -MRI of thoracic and lumbar spine show chronic compression fracture at T12, degenerative changes.  No acute fracture  Neurosurgery has recommended for outpatient follow-up and possible epidural injections  - PT/OT recommend post-acute, not a candidate due to legal hold for SI    Suicidal ideation- (present on admission)  Assessment & Plan  Currently on legal hold for suicidal ideation due to his current health  Psychiatry consulted, extended legal hold  He has not contested, expect 1 week extension    Acute hypoxic respiratory failure (HCC)- (present on admission)  Assessment & Plan  Resolved    Uncontrolled type 2 diabetes mellitus with hyperglycemia (HCC)- (present on admission)  Assessment & Plan  A1c 9.8  Insulin discontinued due to difficulty managing with impaired dexterity / vision and risk of hypoglycemia  Also is a barrier to inpatient psychiatry       VTE prophylaxis: Lovenox    I have performed a physical exam and reviewed and updated  ROS and Plan today (2/18/2025). In review of yesterday's note (2/17/2025), there are no changes except as documented above.

## 2025-02-18 NOTE — PROGRESS NOTES
Received report from PENNIE Barahona. No changes from previous assessment. Patient medicated for pain per MAR. 1X1 sitter at bedside. Patient on LH. All questions answered at this time.

## 2025-02-18 NOTE — DISCHARGE PLANNING
Notice of Medical Clerance filed to the court via [place] notifying the court that pt is now medically clear, scanned copy into pt's chart.

## 2025-02-18 NOTE — CONSULTS
PSYCHIATRIC FOLLOW-UP:(established)  *Reason for admission:     Urinary retention [R33.9]  *Legal Hold Status on Admission:          Initiated  Chart reviewed.          *HPI:          Psychiatry last followed up yesterday, and at that time, he was continued on effexor 150 mg daily for MDD. Continues to require jiménez catheter. He has been watching TV, and eating meals. He reports lack of motivation and a burden to his family. Reports that he has a cousin who committed suicide. Reports his eyes are still bothering him as he couldn't fill his prescription before being admitted. Continues to deal with chronic back pain and he feels that the percocet helps. Father had two cardiac bypasses first in 1979 (born in 1930). No personal cardiac history.     Mood: depressed  Sleep: sleeping okay. Woke around 9am.   Appetite: ate breakfast, appetite is same  Energy:low  SI:HI: reports SI. Feels safe in the hospital.  Denies A/V hallucinations  No delusions     Reviewed nursing noted and spoke with nursing staff. The patient is with medication. The patient has not been aggressive or agitated. The patient is eating (25-50)% of breakfast, (50-75) % of lunch as of flow sheet yesterday. The patient has not required psychiatric PRN medications in the last 24 hours. Per nursing, patient still expressing SI but no other behavioral issues noted.     Medical ROS (as pertinent):     Review of Systems   Constitutional: Negative.    HENT: Negative.     Eyes: Negative.    Respiratory: Negative.     Cardiovascular: Negative.    Gastrointestinal: Negative.    Genitourinary: Negative.    Musculoskeletal:  Positive for back pain and joint pain.   Skin: Negative.    Neurological:  Positive for sensory change.   Endo/Heme/Allergies: Negative.       *Psychiatric Examination:  Vitals:    02/18/25 0720   BP: 120/76   Pulse: 78   Resp: 18   Temp: 36.6 °C (97.8 °F)   SpO2: 90%     General:   - Grooming and hygiene: Appropriate hygiene and grooming  -  "Apparent distress: NAD   - Behavior: Calm and appropriate  - Eye Contact: Within normal limits  - no psychomotor agitation or retardation  - Participation: Active verbal participation  Orientation: Grossly oriented to person, place and time  Mood: \"depressed\"  Affect: Congruent, dysthymic  Thought Process: Linear logical and goal directed  Thought Content: Endorsing SI   Perception: Denies auditory or visual hallucinations. No delusions noted   Attention span and concentration: Intact   Speech: Regular rate, volume and prosody  Language: Appropriate   Insight: Limited  Judgment: Fair  Recent and remote memory: Grossly intact        Current Medications:    Current Facility-Administered Medications:     vitamin D3 (Cholecalciferol) tablet 1,000 Units, 1,000 Units, Oral, DAILY, Ayo Fernandez M.D., 1,000 Units at 02/18/25 0545    tamsulosin (Flomax) capsule 0.8 mg, 0.8 mg, Oral, AFTER BREAKFAST, Rico Peters D.O., 0.8 mg at 02/18/25 0847    glipiZIDE (Glucotrol) tablet 10 mg, 10 mg, Oral, QAM AC, Rico Peters D.O., 10 mg at 02/18/25 0847    pioglitazone (Actos) tablet 30 mg, 30 mg, Oral, DAILY, Rico Peters D.O., 30 mg at 02/18/25 0544    SITagliptin (Januvia) tablet 100 mg, 100 mg, Oral, DAILY, Rico Peters D.O., 100 mg at 02/18/25 0544    oxyCODONE-acetaminophen (Percocet) 5-325 MG per tablet 1 Tablet, 1 Tablet, Oral, Q4HRS PRN, VALENTÍN Kim.O., 1 Tablet at 02/14/25 0913    oxyCODONE-acetaminophen (Percocet-10)  MG per tablet 1 Tablet, 1 Tablet, Oral, Q4HRS PRN, VALENTÍN Kim.O., 1 Tablet at 02/18/25 0545    venlafaxine (Effexor) tablet 150 mg, 150 mg, Oral, DAILY, VALENTÍN Kim.O., 150 mg at 02/18/25 0545    loperamide (Imodium) capsule 2 mg, 2 mg, Oral, 4X/DAY PRN, Rico Peters D.O., 2 mg at 02/15/25 0503    gabapentin (Neurontin) capsule 200 mg, 200 mg, Oral, TID, Alyson Foote D.O., 200 mg at 02/18/25 0545    omeprazole (PriLOSEC) capsule 40 mg, 40 mg, Oral, DAILY, " Alyson Foote D.O., 40 mg at 02/18/25 0544    enoxaparin (Lovenox) inj 40 mg, 40 mg, Subcutaneous, DAILY AT 1800, Anatoliy Eagle D.O., 40 mg at 02/17/25 1700    senna-docusate (Pericolace Or Senokot S) 8.6-50 MG per tablet 2 Tablet, 2 Tablet, Oral, Q EVENING, 2 Tablet at 02/17/25 1700 **AND** polyethylene glycol/lytes (Miralax) Packet 1 Packet, 1 Packet, Oral, QDAY PRN, Anatoliy Eagle D.O.    Pharmacy Consult Request ...Pain Management Review 1 Each, 1 Each, Other, PHARMACY TO DOSE, Anatoliy Eagle D.O.    ondansetron (Zofran) syringe/vial injection 4 mg, 4 mg, Intravenous, Q4HRS PRN, Rico Peters D.O., 4 mg at 02/11/25 0948    insulin lispro (HumaLOG,AdmeLOG) subcutaneous injection, 3-14 Units, Subcutaneous, 4X/DAY ACHS, 4 Units at 02/17/25 2212 **AND** POC blood glucose manual result, , , Q AC AND BEDTIME(S) **AND** NOTIFY MD and PharmD, , , Once **AND** Administer 20 grams of glucose (approximately 8 ounces of fruit juice) every 15 minutes PRN FSBG less than 70 mg/dL, , , PRN **AND** dextrose 50% (D50W) injection 25 g, 25 g, Intravenous, Q15 MIN PRN, Rico Peters D.O.    Allergies:  Ketamine      Labs personally reviewed:         Lab Results   Component Value Date/Time     SODIUM 137 02/14/2025 10:47 AM     POTASSIUM 4.8 02/14/2025 10:47 AM     CHLORIDE 102 02/14/2025 10:47 AM     CO2 28 02/14/2025 10:47 AM     GLUCOSE 211 (H) 02/14/2025 10:47 AM     BUN 23 (H) 02/14/2025 10:47 AM     CREATININE 0.67 02/14/2025 10:47 AM     BUNCREATRAT 16.7 01/25/2024 07:28 AM            Lab Results   Component Value Date/Time     WBC 4.0 (L) 02/14/2025 10:47 AM     RBC 4.07 (L) 02/14/2025 10:47 AM     HEMOGLOBIN 10.1 (L) 02/14/2025 10:47 AM     HEMATOCRIT 32.5 (L) 02/14/2025 10:47 AM     MCV 79.9 (L) 02/14/2025 10:47 AM     MCH 24.8 (L) 02/14/2025 10:47 AM     MCHC 31.1 (L) 02/14/2025 10:47 AM     MPV 9.1 02/14/2025 10:47 AM     NEUTSPOLYS 55.20 02/12/2025 03:47 AM     LYMPHOCYTES 31.90 02/12/2025 03:47 AM      MONOCYTES 8.30 2025 03:47 AM     EOSINOPHILS 4.00 2025 03:47 AM     BASOPHILS 0.30 2025 03:47 AM      Recent Labs     25  0347   ASTSGOT 13   ALTSGPT 14   TBILIRUBIN 0.2   GLOBULIN 2.6               Latest Reference Range & Units 24 16:54   TSH 0.380 - 5.330 uIU/mL 1.280        Latest Reference Range & Units 01/10/25 00:08   Vitamin B12 -True Cobalamin 211 - 911 pg/mL 995 (H)   (H): Data is abnormally high    Latest Reference Range & Units 01/10/25 00:08   25-Hydroxy   Vitamin D 25 30 - 100 ng/mL 11 (L)   (L): Data is abnormally low     *EKG:               Results for orders placed or performed during the hospital encounter of 02/10/25   EKG   Result Value Ref Range     Report           [facility] Emergency Dept.     Test Date:  2025-02-10  Pt Name:    BAILEE TAYLOR               Department: ER  MRN:        [id number]                      Room:       [facility]  Gender:     Male                         Technician: [id number]  :        1965                   Requested By:ER TRIAGE PROTOCOL  Order #:    [id number]                    Reading MD: MERISSA TORRES DO     Measurements  Intervals                                Axis  Rate:       70                           P:          61  CA:         169                          QRS:        60  QRSD:       91                           T:          70  QT:         420  QTc:        454     Interpretive Statements  Sinus rhythm  Borderline low voltage, extremity leads  Compared to ECG 2025 12:50:26  No significant changes  Electronically Signed On 02- 16:26:09 PST by MERISSA TORRES DO            Assessment:  Bailee Taylor is a 60 yo M with a history of T2DM, failure to thrive and prior SI secondary to MDD and chronic back pain. Psychiatry consulted for SI. Continues to express SI and meet criteria for legal hold at this time. Plan to shoot himself with a gun at home. Ongoing depressed mood and low  motivation. Denies AVH. Denies medication adverse effects. Would continue effexor at current dose for mood. Would recommend considering adding methylphenidate 5 mg BID for mood augmentation. No known cardiac history.     Dx:  Major depressive disorder, severe     Medical:  Principal Problem:    Urinary retention (POA: Yes)  Active Problems:    Uncontrolled type 2 diabetes mellitus with hyperglycemia (HCC) (POA: Yes)    Acute hypoxic respiratory failure (HCC) (POA: Yes)    Suicidal ideation (POA: Yes)    Frequent falls (POA: Unknown)    Proliferative diabetic retinopathy of both eyes without macular edema associated with type 2 diabetes mellitus (HCC) (POA: Unknown)  Resolved Problems:    * No resolved hospital problems. *     Plan:  Legal hold: On legal hold  Psychotropic medications  Recommend starting trial of methylphenidate (ritalin) 5 mg BID for mood augmentation  continue Effexor  mg daily for major depressive disorder  continue vitamin D supplementation  Please transfer pt to inpatient psychiatric hospital when medically cleared and bed is available  We will need to confirm firearms removed from home prior to discontinuation of legal hold  Psychiatry will follow up  Discussed with: Dr. Houston     Thank you for the consult.      Legal Hold-extended  Level of observation-one to one  Personal [id number] glasses and wallet  Visitors-no

## 2025-02-18 NOTE — DISCHARGE PLANNING
Legal Hold     Referral: Legal Hold Court     Intervention: Pt presented for legal hold meeting with  via video conferencing to discuss legal options of contesting hold and meeting with the court doctors tomorrow afternoon, or not contesting legal hold and continuing the hold for one week.  advised that pt's legal hold will be be continued for one week (2/27/25).       Plan: Pt's legal hold has been continued for one week. Pt awaiting transfer to an in patient psych facility.

## 2025-02-18 NOTE — DISCHARGE PLANNING
Complex Case Management    Order received for Complex Case Management. Patient's chart has been reviewed.     Complex case management following? Yes    Yes: Case assigned to Wesley AGUILAR    NOTE: There may be cases that are NOT assigned to a CCM but will be followed for progression of care by leaders of the Complex Discharge Committee.

## 2025-02-19 PROCEDURE — 99232 SBSQ HOSP IP/OBS MODERATE 35: CPT | Performed by: STUDENT IN AN ORGANIZED HEALTH CARE EDUCATION/TRAINING PROGRAM

## 2025-02-19 PROCEDURE — 700102 HCHG RX REV CODE 250 W/ 637 OVERRIDE(OP)

## 2025-02-19 PROCEDURE — 97530 THERAPEUTIC ACTIVITIES: CPT

## 2025-02-19 PROCEDURE — A9270 NON-COVERED ITEM OR SERVICE: HCPCS

## 2025-02-19 PROCEDURE — 700111 HCHG RX REV CODE 636 W/ 250 OVERRIDE (IP): Mod: JZ

## 2025-02-19 PROCEDURE — 97535 SELF CARE MNGMENT TRAINING: CPT

## 2025-02-19 PROCEDURE — 700102 HCHG RX REV CODE 250 W/ 637 OVERRIDE(OP): Performed by: INTERNAL MEDICINE

## 2025-02-19 PROCEDURE — A9270 NON-COVERED ITEM OR SERVICE: HCPCS | Performed by: INTERNAL MEDICINE

## 2025-02-19 PROCEDURE — 700101 HCHG RX REV CODE 250: Performed by: STUDENT IN AN ORGANIZED HEALTH CARE EDUCATION/TRAINING PROGRAM

## 2025-02-19 PROCEDURE — 51798 US URINE CAPACITY MEASURE: CPT

## 2025-02-19 PROCEDURE — 700102 HCHG RX REV CODE 250 W/ 637 OVERRIDE(OP): Performed by: STUDENT IN AN ORGANIZED HEALTH CARE EDUCATION/TRAINING PROGRAM

## 2025-02-19 PROCEDURE — 770001 HCHG ROOM/CARE - MED/SURG/GYN PRIV*

## 2025-02-19 PROCEDURE — A9270 NON-COVERED ITEM OR SERVICE: HCPCS | Performed by: EMERGENCY MEDICINE

## 2025-02-19 PROCEDURE — A9270 NON-COVERED ITEM OR SERVICE: HCPCS | Performed by: STUDENT IN AN ORGANIZED HEALTH CARE EDUCATION/TRAINING PROGRAM

## 2025-02-19 PROCEDURE — 700102 HCHG RX REV CODE 250 W/ 637 OVERRIDE(OP): Performed by: EMERGENCY MEDICINE

## 2025-02-19 RX ORDER — GABAPENTIN 300 MG/1
600 CAPSULE ORAL 3 TIMES DAILY
Status: DISCONTINUED | OUTPATIENT
Start: 2025-02-19 | End: 2025-03-01

## 2025-02-19 RX ADMIN — GABAPENTIN 300 MG: 300 CAPSULE ORAL at 05:32

## 2025-02-19 RX ADMIN — SITAGLIPTIN 100 MG: 100 TABLET, FILM COATED ORAL at 05:32

## 2025-02-19 RX ADMIN — GABAPENTIN 600 MG: 300 CAPSULE ORAL at 17:07

## 2025-02-19 RX ADMIN — LIDOCAINE 2 PATCH: 4 PATCH TOPICAL at 12:00

## 2025-02-19 RX ADMIN — GABAPENTIN 300 MG: 300 CAPSULE ORAL at 12:00

## 2025-02-19 RX ADMIN — Medication 1000 UNITS: at 05:31

## 2025-02-19 RX ADMIN — METHOCARBAMOL TABLETS 750 MG: 750 TABLET, COATED ORAL at 08:09

## 2025-02-19 RX ADMIN — METHOCARBAMOL TABLETS 750 MG: 750 TABLET, COATED ORAL at 21:19

## 2025-02-19 RX ADMIN — GLIPIZIDE 10 MG: 5 TABLET ORAL at 08:09

## 2025-02-19 RX ADMIN — PIOGLITAZONE 30 MG: 30 TABLET ORAL at 05:32

## 2025-02-19 RX ADMIN — METHYLPHENIDATE HYDROCHLORIDE 5 MG: 5 TABLET ORAL at 05:32

## 2025-02-19 RX ADMIN — METHYLPHENIDATE HYDROCHLORIDE 5 MG: 5 TABLET ORAL at 16:50

## 2025-02-19 RX ADMIN — VENLAFAXINE HYDROCHLORIDE 150 MG: 75 CAPSULE, EXTENDED RELEASE ORAL at 08:09

## 2025-02-19 RX ADMIN — OMEPRAZOLE 40 MG: 20 CAPSULE, DELAYED RELEASE ORAL at 05:32

## 2025-02-19 RX ADMIN — SENNOSIDES AND DOCUSATE SODIUM 2 TABLET: 50; 8.6 TABLET ORAL at 16:50

## 2025-02-19 RX ADMIN — GABAPENTIN 300 MG: 300 CAPSULE ORAL at 16:50

## 2025-02-19 RX ADMIN — METHOCARBAMOL TABLETS 750 MG: 750 TABLET, COATED ORAL at 16:50

## 2025-02-19 RX ADMIN — METHOCARBAMOL TABLETS 750 MG: 750 TABLET, COATED ORAL at 12:00

## 2025-02-19 RX ADMIN — ENOXAPARIN SODIUM 40 MG: 100 INJECTION SUBCUTANEOUS at 16:49

## 2025-02-19 RX ADMIN — TAMSULOSIN HYDROCHLORIDE 0.4 MG: 0.4 CAPSULE ORAL at 08:09

## 2025-02-19 RX ADMIN — MELOXICAM 15 MG: 7.5 TABLET ORAL at 05:31

## 2025-02-19 ASSESSMENT — COGNITIVE AND FUNCTIONAL STATUS - GENERAL
DAILY ACTIVITIY SCORE: 16
HELP NEEDED FOR BATHING: A LOT
TOILETING: A LOT
PERSONAL GROOMING: A LITTLE
DRESSING REGULAR LOWER BODY CLOTHING: A LOT
DRESSING REGULAR UPPER BODY CLOTHING: A LITTLE
SUGGESTED CMS G CODE MODIFIER DAILY ACTIVITY: CK

## 2025-02-19 ASSESSMENT — ENCOUNTER SYMPTOMS
DEPRESSION: 1
BACK PAIN: 1

## 2025-02-19 ASSESSMENT — PAIN DESCRIPTION - PAIN TYPE: TYPE: ACUTE PAIN

## 2025-02-19 NOTE — CARE PLAN
The patient is Stable - Low risk of patient condition declining or worsening    Shift Goals  Clinical Goals: safety, pain control  Patient Goals: safety  Family Goals: vicente    Progress made toward(s) clinical / shift goals:    Problem: Pain - Standard  Goal: Alleviation of pain or a reduction in pain to the patient’s comfort goal  Outcome: Progressing     Problem: Knowledge Deficit - Standard  Goal: Patient and family/care givers will demonstrate understanding of plan of care, disease process/condition, diagnostic tests and medications  Outcome: Progressing     Problem: Provide Safe Environment  Goal: Suicide environmental safety, protocols, policies, and practices will be implemented  Outcome: Progressing     Problem: Psychosocial  Goal: Patient's ability to identify and develop effective coping behaviors will improve  Outcome: Progressing       Patient is not progressing towards the following goals:

## 2025-02-19 NOTE — CARE PLAN
The patient is Stable - Low risk of patient condition declining or worsening    Shift Goals  Clinical Goals: Patient safety, pain control  Patient Goals: Rest  Family Goals: KATHERINE    Progress made toward(s) clinical / shift goals:  Pain modalities offered such as ambulation, medication, ice pack, heat pack, repositioning, and distraction for pain relief. Non skid socks provided, fall precautions in place per Fall screening flowsheet. Mobilization importance education provided, with fall education. Diet followed per order.  +Void, +BM. Teaching provided per POC, teachback to RN encouraged. Wound care provided per order. Encouraged and reminded of turning q2 hours throughout shift.     Problem: Pain - Standard  Goal: Alleviation of pain or a reduction in pain to the patient’s comfort goal  Outcome: Progressing     Problem: Knowledge Deficit - Standard  Goal: Patient and family/care givers will demonstrate understanding of plan of care, disease process/condition, diagnostic tests and medications  Outcome: Progressing     Problem: Provide Safe Environment  Goal: Suicide environmental safety, protocols, policies, and practices will be implemented  Outcome: Progressing     Problem: Skin Integrity  Goal: Skin integrity is maintained or improved  Outcome: Progressing       Patient is not progressing towards the following goals:

## 2025-02-19 NOTE — THERAPY
Physical Therapy   Daily Treatment     Patient Name: Christoph Taylor  Age:  59 y.o., Sex:  male  Medical Record #: [id number]  Today's Date: 2/18/2025     Precautions  Precautions: (P) Fall Risk  Comments: (P) Lft LE AFO from [employer], front piece does not fit correctly.    Assessment    The pt awake, alert upon entry into the room, willing to work on getting OOB. The pt required total assist to don/doff his AFO on Lft foot, call made to [facility] for adjustment. The pt is @ risk for skin breakdown and he is unable to manage himself. Tadeo is not able to come to the hospital for adjustment, he needs to go to the clinic. The AFO did provide the necessary support for ambulation wo/the front piece.   Functionally the pt required SBA to get seated EOB, CGA w/STS, and Min assist for amb w/FWW.  Distance amb limited by significant drop in BP, symptomatic  BP EOB: 102/75 HR 80  Post ambulation: 57/29  Supine: 94/51  It did take time for the pt recovery once supine.  The pt educated on getting to the EOB and/or OOB for his meals.  PT will cont to follow.     Plan    Treatment Plan Status: (P) Continue Current Treatment Plan  Type of Treatment: Gait Training, Neuro Re-Education / Balance, Self Care / Home Evaluation, Stair Training, Therapeutic Activities, Therapeutic Exercise  Treatment Frequency: 3 Times per Week  Treatment Duration: Until Therapy Goals Met    DC Equipment Recommendations: (P) Unable to determine at this time  Discharge Recommendations: (P) Recommend post-acute placement for additional physical therapy services prior to discharge home     Objective       02/18/25 1645   Time In/Time Out   Therapy Start Time 1607   Therapy End Time 1645   Total Therapy Time 38   Charge Group   PT Therapeutic Activities (Units) 2   Total Time Spent   PT Therapeutic Activities Time Spent (Mins) 38   PT Total Time Spent (Calculated) 38   Precautions   Precautions Fall Risk   Comments Lft LE AFO from [employer], front piece does not  fit correctly.   Vitals   Vitals Comments BP during PT session: /75 HR 80 post ambulation efforts 57/29, once supine 94/51 HR 81. Pt symptomatic.   Pain 0 - 10 Group   Pain Rating Scale (NPRS) 0   Therapist Pain Assessment During Activity   Other Treatments   Other Treatments Provided Call made to [facility] for AFO adjustment. Per prosthetist, they are not able to come into the hospital to adjust the brace. The brace wo/front piece still provides the necessary support. The pt required assistance to don/doff the AFO, socks, and shoes. The pt educated on the importance of getting to the EOB for all meals and/or OOB to the chair.   Balance   Sitting Balance (Static) Fair +   Sitting Balance (Dynamic) Fair   Standing Balance (Static) Fair -   Standing Balance (Dynamic) Poor +   Weight Shift Sitting Fair   Weight Shift Standing Fair   Skilled Intervention Verbal Cuing   Comments standing w/FWW   Bed Mobility    Supine to Sit Standby Assist   Sit to Supine Minimal Assist  (d/t hypotension)   Scooting Supervised  (seated)   Comments HOB flat and use of railing   Gait Analysis   Gait Level Of Assist Minimal Assist   Assistive Device Front Wheel Walker   Distance (Feet) 10   # of Times Distance was Traveled 1   Skilled Intervention Verbal Cuing   Comments Distance limited by c/o dizziness w/BP 57/29. Returned pt to supine w/recovery to 94/51.   Functional Mobility   Sit to Stand Contact Guard Assist  (from EOB->FWW)   Bed, Chair, Wheelchair Transfer   (The pt unable to stay seated d/t drop in BP)   Skilled Intervention Verbal Cuing   6 Clicks Assessment - How much HELP from from another person do you currently need... (If the patient hasn't done an activity recently, how much help from another person do you think he/she would need if he/she tried?)   Turning from your back to your side while in a flat bed without using bedrails? 3   Moving from lying on your back to sitting on the side of a flat bed without  using bedrails? 3   Moving to and from a bed to a chair (including a wheelchair)? 3   Standing up from a chair using your arms (e.g., wheelchair, or bedside chair)? 3   Walking in hospital room? 3   Climbing 3-5 steps with a railing? 2   6 clicks Mobility Score 17   Short Term Goals    Short Term Goal # 1 Pt will be able to perform STS with FWW and SPV in 6 visits to progress functional transfers   Goal Outcome # 1 goal not met   Short Term Goal # 2 Pt will be able to perform stand step transfer with FWW and SPV in 6 visits to progress functional mobility   Goal Outcome # 2 Goal not met   Short Term Goal # 3 Pt will be able to ambulate 15ft with FWW and SPV in 6 visits to progress functional ambulation   Goal Outcome # 3 Goal not met   Short Term Goal # 4 Pt will be able to self propel 150ft in WC in 6 visits to progress functional mobility   Goal Outcome # 4 Goal not met   Short Term Goal # 5 Pt will be able to ascend/descend 5 stairs with SPV in 6 visits in order to safely navigate his home   Goal Outcome # 5 Goal not met   Education Group   Role of Physical Therapist Patient Response Patient;Acceptance;Explanation;Action Demonstration   Physical Therapy Treatment Plan   Physical Therapy Treatment Plan Continue Current Treatment Plan   Anticipated Discharge Equipment and Recommendations   DC Equipment Recommendations Unable to determine at this time   Discharge Recommendations Recommend post-acute placement for additional physical therapy services prior to discharge home   Interdisciplinary Plan of Care Collaboration   IDT Collaboration with  Nursing   Patient Position at End of Therapy In Bed;Call Light within Reach;Tray Table within Reach;Phone within Reach   Collaboration Comments Nrsg notified of pts tx efforts   Session Information   Date / Session Number  2/18--2 (2/3, 2/19) PTA/1   Supervising Physical Therapist (PTA Treatments Only)   Supervising Physical Therapist Vanessa Scott

## 2025-02-19 NOTE — DISCHARGE PLANNING
Received Stipulation and Order from the court continuing pt's legal hold until 2/27, scanned copy into pt's chart.

## 2025-02-19 NOTE — PROGRESS NOTES
Pt A&O x4. Plan of care discussed, all concerns addressed. No complaints of chest pain, SOB, or N/V. Call light and belongings within reach. Pt educated on fall precautions with verbalized understanding. Bed locked and in lowest position with bed alarm on. VSS. All needs met at this time. Sitter at bedside. Room safety checklist performed.

## 2025-02-19 NOTE — PROGRESS NOTES
0205: RN notified of bladder scan 419 ml. Noc hospitalist notified. New orders received.    0220: RN straight cath out 380 ml urine. Pt tolerated procedure well with no complaints of pain.

## 2025-02-19 NOTE — ASSESSMENT & PLAN NOTE
Recurrent  Exacerbated by high-dose tamsulosin, not improved with doxazosin  Improving, have stopped BPH medications thankfully he is urinating well  Cosyntropin stimulation test negative 2/23 for AI  Orthostatic VS and IVF boluses as needed

## 2025-02-19 NOTE — THERAPY
"Occupational Therapy  Daily Treatment     Patient Name: Christoph Taylor  Age:  59 y.o., Sex:  male  Medical Record #: [id number]  Today's Date: 2/19/2025     Precautions: (P) Fall Risk  Comments: (P) LLE AFO, unable to fit front plate    Assessment    Pt pleasant/agreeable to OT tx session. He required max A for donning compression socks, AFO (without front plate 2/2 poor fit and risk of skin breakdown), and BLE custom shoes. Pt limited today by BP with significant onset of dizziness in standing after 5-10 min seated EOB (see readings below). Pt also reporting worsening vision in bilateral eyes contributing to \"dizzy\" sensation. Encouraged pt up to EOB or chair multiple times a day to address BP concerns and minimize further deconditioning, pt agreeable. Will continue to follow for skilled OT services.    Plan    Treatment Plan Status: (P) Continue Current Treatment Plan  Type of Treatment: Self Care / Activities of Daily Living, Adaptive Equipment, Neuro Re-Education / Balance, Therapeutic Exercises, Therapeutic Activity  Treatment Frequency: 3 Times per Week  Treatment Duration: Until Therapy Goals Met    DC Equipment Recommendations: (P) Unable to determine at this time  Discharge Recommendations: (P) Recommend post-acute placement for additional occupational therapy services prior to discharge home       02/19/25 1215   Precautions   Precautions Fall Risk   Comments LLE AFO, unable to fit front plate   Vitals   Vitals Comments /65 supine, 89/54 seated EOB, unable to gain BP standing 2/2 significant dizziness, BP seated 100s/60s, s/s resolved, RN aware   Pain   Intervention Declines   Cognition    Cognition / Consciousness WDL   Comments pleasant/agreeable   Other Treatments   Other Treatments Provided Extensive education on continued EOB/OOB activity to address BP concerns, pt primarily in supine throughout the day. RN educated as well   Balance   Sitting Balance (Static) Fair +   Sitting Balance " (Dynamic) Fair   Standing Balance (Static) Fair -   Standing Balance (Dynamic) Poor +   Weight Shift Sitting Fair   Weight Shift Standing Fair   Skilled Intervention Verbal Cuing   Comments with FWW   Bed Mobility    Supine to Sit Standby Assist   Sit to Supine Standby Assist   Skilled Intervention Verbal Cuing   Activities of Daily Living   Grooming Supervision;Seated   Lower Body Dressing Maximal Assist  (donning socks, AFO, and shoes, front plate omited 2/2 poor fit and risk of skin breakdown)   Skilled Intervention Verbal Cuing;Tactile Cuing   How much help from another person does the patient currently need...   6 Clicks Daily Activity Score 16   Functional Mobility   Sit to Stand Contact Guard Assist   Mobility STS and side steps, limited by significant dizziness   Skilled Intervention Verbal Cuing   Patient / Family Goals   Patient / Family Goal #1 getting new AFO fit   Short Term Goals   Short Term Goal # 1 Pt will demo LB dressing using AD PRN SPV   Goal Outcome # 1 Progressing slower than expected   Short Term Goal # 2 Pt will complete toilet txf SPV   Goal Outcome # 2 Progressing slower than expected   Short Term Goal # 3 Pt will demo toileting hygiene SPV   Goal Outcome # 3 Progressing slower than expected   Short Term Goal # 4 Pt will tolerate seated g/h routine SPV   Goal Outcome # 4 Progressing as expected   Education Group   Role of Occupational Therapist Patient Response Patient;Acceptance;Explanation   Occupational Therapy Treatment Plan    O.T. Treatment Plan Continue Current Treatment Plan   Anticipated Discharge Equipment and Recommendations   DC Equipment Recommendations Unable to determine at this time   Discharge Recommendations Recommend post-acute placement for additional occupational therapy services prior to discharge home   Interdisciplinary Plan of Care Collaboration   IDT Collaboration with  Nursing   Patient Position at End of Therapy Seated;Edge of Bed;Call Light within Reach;Tray  Table within Reach;Phone within Reach  (1:1 sitter in room)   Collaboration Comments RN aware

## 2025-02-19 NOTE — ASSESSMENT & PLAN NOTE
MRI demonstrates chronic compression fracture and degenerative changes - nonoperative management  Opiates contraindicated due to urinary retention, chronic pain, and mood DO  Multimodal regimen:  Increased gabapentin to 600 mg TID  Scheduled NSAID - HELD DURING STEROIDS  SNRI - venlafaxine  Scheduled Muscle relaxant - robaxin QID  Lidoderm topical therapy  Warm compress  Psychiatry consultation for CBD  Refractory -  5-day course of steroids 2/20-2/24

## 2025-02-20 PROCEDURE — 700102 HCHG RX REV CODE 250 W/ 637 OVERRIDE(OP): Performed by: STUDENT IN AN ORGANIZED HEALTH CARE EDUCATION/TRAINING PROGRAM

## 2025-02-20 PROCEDURE — A9270 NON-COVERED ITEM OR SERVICE: HCPCS | Performed by: STUDENT IN AN ORGANIZED HEALTH CARE EDUCATION/TRAINING PROGRAM

## 2025-02-20 PROCEDURE — 700111 HCHG RX REV CODE 636 W/ 250 OVERRIDE (IP): Mod: JZ

## 2025-02-20 PROCEDURE — 99232 SBSQ HOSP IP/OBS MODERATE 35: CPT | Performed by: STUDENT IN AN ORGANIZED HEALTH CARE EDUCATION/TRAINING PROGRAM

## 2025-02-20 PROCEDURE — 700102 HCHG RX REV CODE 250 W/ 637 OVERRIDE(OP)

## 2025-02-20 PROCEDURE — 700102 HCHG RX REV CODE 250 W/ 637 OVERRIDE(OP): Performed by: INTERNAL MEDICINE

## 2025-02-20 PROCEDURE — 700102 HCHG RX REV CODE 250 W/ 637 OVERRIDE(OP): Performed by: EMERGENCY MEDICINE

## 2025-02-20 PROCEDURE — A9270 NON-COVERED ITEM OR SERVICE: HCPCS | Performed by: EMERGENCY MEDICINE

## 2025-02-20 PROCEDURE — 700101 HCHG RX REV CODE 250: Performed by: STUDENT IN AN ORGANIZED HEALTH CARE EDUCATION/TRAINING PROGRAM

## 2025-02-20 PROCEDURE — 51798 US URINE CAPACITY MEASURE: CPT

## 2025-02-20 PROCEDURE — 700105 HCHG RX REV CODE 258: Performed by: STUDENT IN AN ORGANIZED HEALTH CARE EDUCATION/TRAINING PROGRAM

## 2025-02-20 PROCEDURE — A9270 NON-COVERED ITEM OR SERVICE: HCPCS | Performed by: INTERNAL MEDICINE

## 2025-02-20 PROCEDURE — 770001 HCHG ROOM/CARE - MED/SURG/GYN PRIV*

## 2025-02-20 PROCEDURE — 700111 HCHG RX REV CODE 636 W/ 250 OVERRIDE (IP): Performed by: STUDENT IN AN ORGANIZED HEALTH CARE EDUCATION/TRAINING PROGRAM

## 2025-02-20 PROCEDURE — A9270 NON-COVERED ITEM OR SERVICE: HCPCS

## 2025-02-20 RX ORDER — SODIUM CHLORIDE 9 MG/ML
1000 INJECTION, SOLUTION INTRAVENOUS ONCE
Status: COMPLETED | OUTPATIENT
Start: 2025-02-20 | End: 2025-02-20

## 2025-02-20 RX ORDER — DEXAMETHASONE SODIUM PHOSPHATE 4 MG/ML
4 INJECTION, SOLUTION INTRA-ARTICULAR; INTRALESIONAL; INTRAMUSCULAR; INTRAVENOUS; SOFT TISSUE DAILY
Status: COMPLETED | OUTPATIENT
Start: 2025-02-20 | End: 2025-02-24

## 2025-02-20 RX ORDER — DOXAZOSIN 2 MG/1
2 TABLET ORAL
Status: DISCONTINUED | OUTPATIENT
Start: 2025-02-20 | End: 2025-02-24

## 2025-02-20 RX ADMIN — PIOGLITAZONE 30 MG: 30 TABLET ORAL at 04:14

## 2025-02-20 RX ADMIN — METHOCARBAMOL TABLETS 750 MG: 750 TABLET, COATED ORAL at 21:30

## 2025-02-20 RX ADMIN — GABAPENTIN 600 MG: 300 CAPSULE ORAL at 11:25

## 2025-02-20 RX ADMIN — GABAPENTIN 600 MG: 300 CAPSULE ORAL at 04:14

## 2025-02-20 RX ADMIN — GLIPIZIDE 10 MG: 5 TABLET ORAL at 09:01

## 2025-02-20 RX ADMIN — LOPERAMIDE HYDROCHLORIDE 2 MG: 2 CAPSULE ORAL at 18:04

## 2025-02-20 RX ADMIN — SODIUM CHLORIDE 1000 ML: 9 INJECTION, SOLUTION INTRAVENOUS at 12:03

## 2025-02-20 RX ADMIN — DOXAZOSIN 2 MG: 2 TABLET ORAL at 21:30

## 2025-02-20 RX ADMIN — VENLAFAXINE HYDROCHLORIDE 150 MG: 75 CAPSULE, EXTENDED RELEASE ORAL at 09:00

## 2025-02-20 RX ADMIN — ENOXAPARIN SODIUM 40 MG: 100 INJECTION SUBCUTANEOUS at 17:57

## 2025-02-20 RX ADMIN — OMEPRAZOLE 40 MG: 20 CAPSULE, DELAYED RELEASE ORAL at 04:14

## 2025-02-20 RX ADMIN — METHOCARBAMOL TABLETS 750 MG: 750 TABLET, COATED ORAL at 09:01

## 2025-02-20 RX ADMIN — METHOCARBAMOL TABLETS 750 MG: 750 TABLET, COATED ORAL at 17:57

## 2025-02-20 RX ADMIN — LIDOCAINE 2 PATCH: 4 PATCH TOPICAL at 11:26

## 2025-02-20 RX ADMIN — TAMSULOSIN HYDROCHLORIDE 0.4 MG: 0.4 CAPSULE ORAL at 09:01

## 2025-02-20 RX ADMIN — Medication 1000 UNITS: at 04:14

## 2025-02-20 RX ADMIN — MELOXICAM 15 MG: 7.5 TABLET ORAL at 04:14

## 2025-02-20 RX ADMIN — SITAGLIPTIN 100 MG: 100 TABLET, FILM COATED ORAL at 04:14

## 2025-02-20 RX ADMIN — METHYLPHENIDATE HYDROCHLORIDE 5 MG: 5 TABLET ORAL at 17:57

## 2025-02-20 RX ADMIN — METHOCARBAMOL TABLETS 750 MG: 750 TABLET, COATED ORAL at 12:08

## 2025-02-20 RX ADMIN — DEXAMETHASONE SODIUM PHOSPHATE 4 MG: 4 INJECTION INTRA-ARTICULAR; INTRALESIONAL; INTRAMUSCULAR; INTRAVENOUS; SOFT TISSUE at 17:57

## 2025-02-20 RX ADMIN — METHYLPHENIDATE HYDROCHLORIDE 5 MG: 5 TABLET ORAL at 04:14

## 2025-02-20 RX ADMIN — GABAPENTIN 600 MG: 300 CAPSULE ORAL at 17:56

## 2025-02-20 ASSESSMENT — PATIENT HEALTH QUESTIONNAIRE - PHQ9
9. THOUGHTS THAT YOU WOULD BE BETTER OFF DEAD, OR OF HURTING YOURSELF: SEVERAL DAYS
7. TROUBLE CONCENTRATING ON THINGS, SUCH AS READING THE NEWSPAPER OR WATCHING TELEVISION: SEVERAL DAYS
SUM OF ALL RESPONSES TO PHQ9 QUESTIONS 1 AND 2: 2
4. FEELING TIRED OR HAVING LITTLE ENERGY: SEVERAL DAYS
5. POOR APPETITE OR OVEREATING: SEVERAL DAYS
3. TROUBLE FALLING OR STAYING ASLEEP OR SLEEPING TOO MUCH: SEVERAL DAYS
2. FEELING DOWN, DEPRESSED, IRRITABLE, OR HOPELESS: SEVERAL DAYS
8. MOVING OR SPEAKING SO SLOWLY THAT OTHER PEOPLE COULD HAVE NOTICED. OR THE OPPOSITE, BEING SO FIGETY OR RESTLESS THAT YOU HAVE BEEN MOVING AROUND A LOT MORE THAN USUAL: SEVERAL DAYS
SUM OF ALL RESPONSES TO PHQ QUESTIONS 1-9: 9
1. LITTLE INTEREST OR PLEASURE IN DOING THINGS: SEVERAL DAYS
6. FEELING BAD ABOUT YOURSELF - OR THAT YOU ARE A FAILURE OR HAVE LET YOURSELF OR YOUR FAMILY DOWN: SEVERAL DAYS

## 2025-02-20 ASSESSMENT — ENCOUNTER SYMPTOMS
DEPRESSION: 1
BACK PAIN: 1

## 2025-02-20 ASSESSMENT — PAIN DESCRIPTION - PAIN TYPE
TYPE: ACUTE PAIN
TYPE: ACUTE PAIN

## 2025-02-20 NOTE — DISCHARGE PLANNING
Medical Social Work  PC to patient's sister Delia [phone]; MYRON called to introduce himself.    MYRON was provided a great deal of information.  Delia stated that she has not been upfront with previous .  Telling MYRON that she is just worn out trying to help her brother.     Patient's home, has been working with a realtor to sell the home.  Due to the condition of the home they may end up selling it to someone on a flip.  Delia stated she was told her brother might get between 10 and 15,000.  [place] have taken months to sell.  Delia said she left a message with the realtor this morning, and is hopeful will receive a call back today.    Delia stated her brother has had a drug/alcohol problem in the past.  When he gets money he spends it quickly.      MYRON asked where she feels her brother should go when discharged.  Delia stated he can't make it on his own due to his diabilities poor diet and spending habits.  Feels his needs are to high for a group home, does not have the income for an assisted living.  So maybe a long term skilled facility.     Delia stated her brother does not advocate for himself, wants others to.  Told him over a year ago to apply for Social Security Disability, to her knowledge he has not.  Purchased a car last year from [employer] and never registered it.  Has been living in [place] for 4 years and still has a [place] D[place] License.  Has been given DPOA paperwork and has never  filled it out.      MYRON asked Delia if she is willing to be patients' DPOA, MYRON told no.  That she has spoken to the family, patients' brother and son Nickolas.  Patient's brother has declined and Nickolas is not sure.  Delia stated patient has three sons, only Nickolas is willing to help, one son wants nothing to do with his father the other just doesn't care.     MYRON was able to verify patient has a little under $6,000 in his checking account, and Nickolas is able to  access his account.    MYRON requested Delia contact him once she receives an update from the Realtor.

## 2025-02-20 NOTE — PROGRESS NOTES
[facility] Medicine Daily Progress Note    Date of Service  2/20/2025    Chief Complaint  Christoph Taylor is a 59 y.o. male admitted 2/10/2025 with   Chief Complaint   Patient presents with    T-5000 FALL    Back Pain         Hospital Course  Patient is a 59-year-old male with extensive past medical history of poorly controlled type 2 diabetes mellitus, failure to thrive, and prior suicidal ideations secondary to major depressive disorder as well as his chronic back pain status post multiple laminectomies and fractures who presents due to recurrent falls as well as progressive worsening back pain. Patient was initially held in the emergency department due to suicidal ideation, did not meet any admission criteria, and had progressive worsening of his bilateral lower extremity. Patient reports he chronically has fecal incontinence for the past several months, no other new neurologic symptoms. However, patient does report new onset of acute urinary retention prompting ER provider to order stat MRI of thoracic and lumbar spine due to previous evidence of cord compression.     MRI of thoracic and lumbar showed chronic compression deformity at T12.  Postsurgical and degenerative changes.  No acute fracture.  No significant interval change.    Patient had persistent urinary retention requiring Crowley catheter placement.  Started on tamsulosin.    Patient has difficulty taking care of himself.  He has difficulty managing his diabetes.  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  Also states decreased range of motion and flexibility, dexterity of his bilateral hands which is previous cervical surgery was supposed to fix.  States he has a lot of trouble with glucometer, lancets, using insulin pens.  He was previously switched to oral hypoglycemic agents but still was not under good control and he would still like to be able to check his blood sugar because it dropped severely low previously while he  was at [place].  He apparently started having convulsions and they thought a cardiac arrest.  He also has poor vision making very difficult to do manage his meds and glucometer.  Previously saw optometry and diagnosed with diabetic retinopathy and cataracts.    Was placed on legal hold for suicidal ideations.    Interval Problem Update    HENRI ON  He has ongoing back pain that is not significantly changed.  He has occasional Right should pain which is exacerbated today.  We discussed a trial of steroids instead of NSAID.  No other change in adjuncts.  He is agreeable.  He is feeling more depressed today and voiced suicidal intention to his primary RN.    Good participation in PT but limited by orthostasis.  Transitioned tamsulosin to doxazosin.  1 L NS bolus today.    No new labs nor imaging.    Intermittent catheterizations today.  Has been able to spontaneously void.    I have discussed this patient's plan of care and discharge plan at IDT rounds today with Case Management, Nursing, Nursing leadership, and other members of the IDT team.    Consultants/Specialty  psychiatry    Code Status  Full Code    Disposition  The patient is not medically cleared for discharge to home or a post-acute facility.  Anticipate discharge to: skilled nursing facility    I have placed the appropriate orders for post-discharge needs.    Review of Systems  Review of Systems   Musculoskeletal:  Positive for back pain and joint pain.   Psychiatric/Behavioral:  Positive for depression and suicidal ideas.         Physical Exam  Temp:  [36.4 °C (97.6 °F)-36.8 °C (98.2 °F)] 36.8 °C (98.2 °F)  Pulse:  [79-91] 83  Resp:  [17-18] 18  BP: (114-138)/(72-89) 135/76  SpO2:  [93 %-96 %] 95 %    Physical Exam  Vitals and nursing note reviewed. Exam conducted with a chaperone present (Sitter at bedside).   Constitutional:       General: He is not in acute distress.     Appearance: Normal appearance. He is not ill-appearing, toxic-appearing or  diaphoretic.   HENT:      Head: Normocephalic.      Ears:      Comments: +Presbycusis     Nose: Nose normal.      Mouth/Throat:      Mouth: Mucous membranes are moist.   Eyes:      General: No scleral icterus.     Conjunctiva/sclera: Conjunctivae normal.   Pulmonary:      Effort: Pulmonary effort is normal. No respiratory distress.   Genitourinary:     Comments: No jiménez  Skin:     General: Skin is warm and dry.   Neurological:      Mental Status: He is alert.      Comments: Appropriately conversant, moves all extremities   Psychiatric:         Attention and Perception: Attention and perception normal.         Mood and Affect: Affect normal. Mood is depressed.         Speech: Speech normal.         Behavior: Behavior is withdrawn. Behavior is cooperative.         Thought Content: Thought content normal.         Cognition and Memory: Cognition and memory normal.         Judgment: Judgment normal.         Fluids    Intake/Output Summary (Last 24 hours) at 2/20/2025 1556  Last data filed at 2/20/2025 0616  Gross per 24 hour   Intake 400 ml   Output 2050 ml   Net -1650 ml        Laboratory                            Imaging  [id number]   Final Result      1.  Unremarkable orbits.   2.  No facial soft tissue swelling or mass.   3.  Paranasal sinuses are clear.      MR-THORACIC SPINE-W/O   Final Result      1.  Moderate chronic compression deformity at T12.   2.  No acute fracture.   3.  Multifocal degenerative disease as described above.   4.  There has been no significant interval change.      MR-LUMBAR SPINE-W/O   Final Result      1.  Postsurgical and degenerative changes as described above.   2.  Moderate chronic compression deformity at T12.   3.  No acute abnormality.   4.  There has been no significant interval change.      CT-LSPINE W/O PLUS RECONS   Final Result      1. No acute fracture or subluxation involving the lumbar spine.   2. Stable anterior wedge compression deformity of the superior endplate  of T12.   3. Stable degenerative grade 1 anterolisthesis of L4 with respect to L3.   4. New postsurgical changes of posterior lateral interbody fusion at L4-5.   5. Horseshoe kidney.   6. Circumferential bladder wall thickening.      CT-TSPINE W/O PLUS RECONS   Final Result      Chronic appearing fractures without a definite acute fracture or change in alignment.      CT-CSPINE WITHOUT PLUS RECONS   Final Result      1. No acute osseous injury of the cervical spine.   2. Stable postsurgical changes of ACDF from C4 through C7.   3. Stable multilevel cervical spondylosis.      CT-HEAD W/O   Final Result      1. Age-related central and cortical atrophy.   2. No acute intracranial abnormality.                    Assessment/Plan  * Urinary retention- (present on admission)  Assessment & Plan  Improved  Patient with acute urinary retention after fall, given extensive back history concern for potential cord abnormality.    Transitioned to doxazosin  Bladder scan, PVR with straight cath >300    Chronic midline back pain- (present on admission)  Assessment & Plan  MRI demonstrates chronic compression fracture and degenerative changes - nonoperative management  Opiates contraindicated due to urinary retention, chronic pain, and mood DO  Multimodal regimen:  Increased gabapentin to 600 mg TID  Scheduled NSAID - HELD DURING STEROIDS  SNRI - venlafaxine  Scheduled Muscle relaxant - robaxin QID  Lidoderm topical therapy  Warm compress  Psychiatry consultation for CBD  Refractory - initiating 5-day course of steroids 2/20    Orthostatic hypotension  Assessment & Plan  Recurrent  Exacerbated by high-dose tamsulosin  Transitioned to doxazosin QHS for better tolerability    Proliferative diabetic retinopathy of both eyes without macular edema associated with type 2 diabetes mellitus (HCC)- (present on admission)  Assessment & Plan  Complains of worsening vision  Patient Previous saw Soraida Bustamante OD on 10/5/2024.  Exam is uploaded into  the media tab.  At that time his right eye visual acuity was 20/40-2, left eye 20/150-1.  Referral was placed to [facility] for diabetic retinopathy and cataracts but patient has been unable to follow-up.  Patient complained of progressively worsening vision more acutely over the past few days.  He had a CT orbits on 2/16 which was unremarkable  Follow up with outpatient ophthalmologist    Frequent falls- (present on admission)  Assessment & Plan  Patient with frequent falls due to chronic and progressively worsening bilateral lower extremity weakness with multiple different spinal surgeries.  Patient does report chronic bowel incontinence and recently has developed urinary retention after previous fall with concern for potential cord compression/abnormality.  Lower extremity weakness 4+ out of 5, likely generalized, however asked by ER provider to admit for MRI evaluation  -MRI of thoracic and lumbar spine show chronic compression fracture at T12, degenerative changes.  No acute fracture  Neurosurgery has recommended for outpatient follow-up and possible epidural injections  - PT/OT recommend post-acute, not a candidate due to legal hold for SI    Suicidal ideation- (present on admission)  Assessment & Plan  Currently on legal hold for suicidal ideation due to his current health  Psychiatry consulted, extended legal hold  He has not contested, expect 1 week extension    Acute hypoxic respiratory failure (HCC)- (present on admission)  Assessment & Plan  Resolved    Uncontrolled type 2 diabetes mellitus with hyperglycemia (HCC)- (present on admission)  Assessment & Plan  A1c 9.8  Insulin discontinued due to difficulty managing with impaired dexterity / vision and risk of hypoglycemia  Also is a barrier to inpatient psychiatry       VTE prophylaxis: Lovenox    I have performed a physical exam and reviewed and updated ROS and Plan today (2/20/2025). In review of yesterday's note (2/19/2025), there are no  changes except as documented above.

## 2025-02-20 NOTE — PROGRESS NOTES
[facility] Medicine Daily Progress Note    Date of Service  2/19/2025    Chief Complaint  Christoph Taylor is a 59 y.o. male admitted 2/10/2025 with   Chief Complaint   Patient presents with    T-5000 FALL    Back Pain         Hospital Course  Patient is a 59-year-old male with extensive past medical history of poorly controlled type 2 diabetes mellitus, failure to thrive, and prior suicidal ideations secondary to major depressive disorder as well as his chronic back pain status post multiple laminectomies and fractures who presents due to recurrent falls as well as progressive worsening back pain. Patient was initially held in the emergency department due to suicidal ideation, did not meet any admission criteria, and had progressive worsening of his bilateral lower extremity. Patient reports he chronically has fecal incontinence for the past several months, no other new neurologic symptoms. However, patient does report new onset of acute urinary retention prompting ER provider to order stat MRI of thoracic and lumbar spine due to previous evidence of cord compression.     MRI of thoracic and lumbar showed chronic compression deformity at T12.  Postsurgical and degenerative changes.  No acute fracture.  No significant interval change.    Patient had persistent urinary retention requiring Crowley catheter placement.  Started on tamsulosin.    Patient has difficulty taking care of himself.  He has difficulty managing his diabetes.  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  Also states decreased range of motion and flexibility, dexterity of his bilateral hands which is previous cervical surgery was supposed to fix.  States he has a lot of trouble with glucometer, lancets, using insulin pens.  He was previously switched to oral hypoglycemic agents but still was not under good control and he would still like to be able to check his blood sugar because it dropped severely low previously while he  was at [place].  He apparently started having convulsions and they thought a cardiac arrest.  He also has poor vision making very difficult to do manage his meds and glucometer.  Previously saw optometry and diagnosed with diabetic retinopathy and cataracts.    Was placed on legal hold for suicidal ideations.    Interval Problem Update    HENRI ON  Reports no change in back pain.  Advised this is good, as he is off the opiates.  Will up-titrate gabapentin.  He is depressed.  Has not noticed any difference with ritalin.    POC discussed with psychiatry Dr. Crandall. Will discuss psychotherapy for chronic pain as able. Anticipate he will not be a candidate for IP psych due to care exceeding capacity. Will develop safety plan and provide acute psychiatric care until legal hold can be lifted.    No new labs nor imaging.    Intermittent catheterizations today.  Has been able to spontaneously void.    I have discussed this patient's plan of care and discharge plan at IDT rounds today with Case Management, Nursing, Nursing leadership, and other members of the IDT team.    Consultants/Specialty  psychiatry    Code Status  Full Code    Disposition  The patient is medically cleared for discharge to home or a post-acute facility.  Anticipate discharge to: skilled nursing facility    I have placed the appropriate orders for post-discharge needs.    Review of Systems  Review of Systems   Musculoskeletal:  Positive for back pain and joint pain.   Psychiatric/Behavioral:  Positive for depression.         Physical Exam  Temp:  [36.5 °C (97.7 °F)-37.2 °C (99 °F)] 36.7 °C (98.1 °F)  Pulse:  [78-97] 83  Resp:  [16-19] 18  BP: (117-137)/(51-75) 126/64  SpO2:  [92 %-99 %] 99 %    Physical Exam  Vitals and nursing note reviewed. Exam conducted with a chaperone present (Sitter at bedside).   Constitutional:       General: He is not in acute distress.     Appearance: Normal appearance. He is not ill-appearing, toxic-appearing or diaphoretic.    HENT:      Head: Normocephalic.      Ears:      Comments: +Presbycusis     Nose: Nose normal.      Mouth/Throat:      Mouth: Mucous membranes are moist.   Eyes:      General: No scleral icterus.     Conjunctiva/sclera: Conjunctivae normal.   Pulmonary:      Effort: Pulmonary effort is normal. No respiratory distress.   Genitourinary:     Comments: No jiménez  Skin:     General: Skin is warm and dry.   Neurological:      Mental Status: He is alert.      Comments: Appropriately conversant, moves all extremities   Psychiatric:         Attention and Perception: Attention and perception normal.         Mood and Affect: Affect normal. Mood is depressed.         Speech: Speech normal.         Behavior: Behavior is withdrawn. Behavior is cooperative.         Thought Content: Thought content normal.         Cognition and Memory: Cognition and memory normal.         Judgment: Judgment normal.         Fluids    Intake/Output Summary (Last 24 hours) at 2/19/2025 1704  Last data filed at 2/19/2025 1600  Gross per 24 hour   Intake --   Output 2780 ml   Net -2780 ml        Laboratory                            Imaging  [id number]   Final Result      1.  Unremarkable orbits.   2.  No facial soft tissue swelling or mass.   3.  Paranasal sinuses are clear.      MR-THORACIC SPINE-W/O   Final Result      1.  Moderate chronic compression deformity at T12.   2.  No acute fracture.   3.  Multifocal degenerative disease as described above.   4.  There has been no significant interval change.      MR-LUMBAR SPINE-W/O   Final Result      1.  Postsurgical and degenerative changes as described above.   2.  Moderate chronic compression deformity at T12.   3.  No acute abnormality.   4.  There has been no significant interval change.      CT-LSPINE W/O PLUS RECONS   Final Result      1. No acute fracture or subluxation involving the lumbar spine.   2. Stable anterior wedge compression deformity of the superior endplate of T12.   3.  Stable degenerative grade 1 anterolisthesis of L4 with respect to L3.   4. New postsurgical changes of posterior lateral interbody fusion at L4-5.   5. Horseshoe kidney.   6. Circumferential bladder wall thickening.      CT-TSPINE W/O PLUS RECONS   Final Result      Chronic appearing fractures without a definite acute fracture or change in alignment.      CT-CSPINE WITHOUT PLUS RECONS   Final Result      1. No acute osseous injury of the cervical spine.   2. Stable postsurgical changes of ACDF from C4 through C7.   3. Stable multilevel cervical spondylosis.      CT-HEAD W/O   Final Result      1. Age-related central and cortical atrophy.   2. No acute intracranial abnormality.                    Assessment/Plan  * Urinary retention- (present on admission)  Assessment & Plan  Improved  Patient with acute urinary retention after fall, given extensive back history concern for potential cord abnormality.    Continue tamsulosin  Bladder scan, PVR with straight cath >300    Chronic midline back pain- (present on admission)  Assessment & Plan  MRI demonstrates chronic compression fracture and degenerative changes - nonoperative management  Opiates contraindicated due to urinary retention, chronic pain, and mood DO  Multimodal regimen:  Increased gabapentin to 600 mg TID  Scheduled NSAID - meloxicam QD  SNRI - venlafaxine  Scheduled Muscle relaxant - robaxin QID  Lidoderm topical therapy  Warm compress  Psychiatry consultation for CBD  If refractory, can initiate short course of steroids    Orthostatic hypotension  Assessment & Plan  Resolved  Likely exacerbated by high-dose tamsulosin  Decreased tamsulosin to 0.4 mg daily    Proliferative diabetic retinopathy of both eyes without macular edema associated with type 2 diabetes mellitus (HCC)- (present on admission)  Assessment & Plan  Complains of worsening vision  Patient Previous saw Soraida Bustamante OD on 10/5/2024.  Exam is uploaded into the media tab.  At that time his right  eye visual acuity was 20/40-2, left eye 20/150-1.  Referral was placed to [facility] for diabetic retinopathy and cataracts but patient has been unable to follow-up.  Patient complained of progressively worsening vision more acutely over the past few days.  He had a CT orbits on 2/16 which was unremarkable  Follow up with outpatient ophthalmologist    Frequent falls- (present on admission)  Assessment & Plan  Patient with frequent falls due to chronic and progressively worsening bilateral lower extremity weakness with multiple different spinal surgeries.  Patient does report chronic bowel incontinence and recently has developed urinary retention after previous fall with concern for potential cord compression/abnormality.  Lower extremity weakness 4+ out of 5, likely generalized, however asked by ER provider to admit for MRI evaluation  -MRI of thoracic and lumbar spine show chronic compression fracture at T12, degenerative changes.  No acute fracture  Neurosurgery has recommended for outpatient follow-up and possible epidural injections  - PT/OT recommend post-acute, not a candidate due to legal hold for SI    Suicidal ideation- (present on admission)  Assessment & Plan  Currently on legal hold for suicidal ideation due to his current health  Psychiatry consulted, extended legal hold  He has not contested, expect 1 week extension    Acute hypoxic respiratory failure (HCC)- (present on admission)  Assessment & Plan  Resolved    Uncontrolled type 2 diabetes mellitus with hyperglycemia (HCC)- (present on admission)  Assessment & Plan  A1c 9.8  Insulin discontinued due to difficulty managing with impaired dexterity / vision and risk of hypoglycemia  Also is a barrier to inpatient psychiatry       VTE prophylaxis: Lovenox    I have performed a physical exam and reviewed and updated ROS and Plan today (2/19/2025). In review of yesterday's note (2/18/2025), there are no changes except as documented above.

## 2025-02-20 NOTE — DISCHARGE PLANNING
Alert Team Note     Attempted to contact [facility], no answer. Left confidential VM and call back information was provided.     [phone] Contacted Antonia at [facility] [place], was advised they no longer have pt's referral. Re faxed referral.

## 2025-02-20 NOTE — DISCHARGE PLANNING
"Medical Social Work  SW introduced himself and role in his discharge.  Patient is aware what a SW does, helping him find a place.       SW asked patient where he wants to go when he is medically cleared.  Patient stated he isn't sure, \"I guess home, do I have other choices.\"  SW stated that he does, going to a skilled or a group home long term.  Both of those he would be required to sell his home to pay for cost of care. Patient stated he really doesn't want this would prefer to go home then.    SW asked patient how long he was at home before being brought back to the hospital.  Patient stated a couple days he thinks, I fell and couldn't get back up.  \"I didn't have anyone to help me.\"  When SW began asking about reaching out to in home providers, patient responded that he couldn't hold the phone to call.  SW stated that it sounds like patient wants to go home, but will need help.  Patient stated yes.  Lien stated that patient should qualify for [place] Medicaid [employer].  To qualify patient will need to have less than $2,000 in assists. SW brought up his fiances,as past chart note indicates he had $6,000 in his account.  Patient told SW that he doesn't have that much money, as he had to pay bills.  SW asked if he paid them on line, patient stated yes and asked why SW was asking him this.    SW stated if patient is going home he will need to be able to use his phone.      SW stated depending on patient's assists and income he will need to do a spend down. Which would be paying for care until he is approved for Medicaid Community Waiver.  SW asked if patient has friends/family that would be willing to assist in his care, patient stated no one.  SW stated that there are several companies that can offer this service such as [employer].     Patient stated he isn't sure what he wants to do.  SW asked if his son can access his funds. Patient stated yes and gave permission to LIEN to contact him and discuss his case. "  SW asked about contacting his sister, SW also given permission.

## 2025-02-20 NOTE — CARE PLAN
Problem: Pain - Standard  Goal: Alleviation of pain or a reduction in pain to the patient’s comfort goal  Outcome: Progressing     Problem: Knowledge Deficit - Standard  Goal: Patient and family/care givers will demonstrate understanding of plan of care, disease process/condition, diagnostic tests and medications  Outcome: Progressing     Problem: Provide Safe Environment  Goal: Suicide environmental safety, protocols, policies, and practices will be implemented  Outcome: Progressing       The patient is Stable - Low risk of patient condition declining or worsening    Shift Goals  Clinical Goals: Pain control, safety, mobility  Patient Goals: Pain control  Family Goals: vicente    Progress made toward(s) clinical / shift goals:  Taught pt 0-10 pain scale and non-pharmacological method of pain management, encouraged to verbalize when in pain. Administered PRN pain meds as needed. 1:1 safety sitter at bedside. Bed locked and in lowest position. Safety and fall precautions, in place. Safety checklist and disposable paper gown in place. Hourly rounding. Call light and belongings within reach. Pt able to sit on the edge of bed with stand by assist.     Patient is not progressing towards the following goals:

## 2025-02-20 NOTE — CARE PLAN
The patient is Stable - Low risk of patient condition declining or worsening    Shift Goals  Clinical Goals: safety, pain mgt  Patient Goals: rest and comfort  Family Goals: vicente    Progress made toward(s) clinical / shift goals:      Patient is aox4, denies any SOB/Chest pain/N and V. Able to follow commands, dressing is C/D/I. Still on l2k extended till next week 2/27        Problem: Pain - Standard  Goal: Alleviation of pain or a reduction in pain to the patient’s comfort goal  Description: Target End Date:  Prior to discharge or change in level of care    Document on Vitals flowsheet    1.  Document pain using the appropriate pain scale per order or unit policy  2.  Educate and implement non-pharmacologic comfort measures (i.e. relaxation, distraction, massage, cold/heat therapy, etc.)  3.  Pain management medications as ordered  4.  Reassess pain after pain med administration per policy  5.  If opiods administered assess patient's response to pain medication is appropriate per POSS sedation scale  6.  Follow pain management plan developed in collaboration with patient and interdisciplinary team (including palliative care or pain specialists if applicable)  Outcome: Progressing     Problem: Knowledge Deficit - Standard  Goal: Patient and family/care givers will demonstrate understanding of plan of care, disease process/condition, diagnostic tests and medications  Description: Target End Date:  1-3 days or as soon as patient condition allows    Document in Patient Education    1.  Patient and family/caregiver oriented to unit, equipment, visitation policy and means for communicating concern  2.  Complete/review Learning Assessment  3.  Assess knowledge level of disease process/condition, treatment plan, diagnostic tests and medications  4.  Explain disease process/condition, treatment plan, diagnostic tests and medications  Outcome: Progressing     Problem: Provide Safe Environment  Goal: Suicide environmental  safety, protocols, policies, and practices will be implemented  Description: Target End Date:  resolve day 1    1.  Remove objects or personal belongings that may cause harm or injury to self or others  2.  Dietary tray modifications (paperware)  3.  Provide a safe environment  4.  Render close patient supervision by sustaining observation or awareness of the patient at all times  Outcome: Progressing       Patient is not progressing towards the following goals:

## 2025-02-21 PROCEDURE — A9270 NON-COVERED ITEM OR SERVICE: HCPCS | Performed by: INTERNAL MEDICINE

## 2025-02-21 PROCEDURE — A9270 NON-COVERED ITEM OR SERVICE: HCPCS | Performed by: STUDENT IN AN ORGANIZED HEALTH CARE EDUCATION/TRAINING PROGRAM

## 2025-02-21 PROCEDURE — 700102 HCHG RX REV CODE 250 W/ 637 OVERRIDE(OP)

## 2025-02-21 PROCEDURE — 700111 HCHG RX REV CODE 636 W/ 250 OVERRIDE (IP): Mod: JZ

## 2025-02-21 PROCEDURE — 700102 HCHG RX REV CODE 250 W/ 637 OVERRIDE(OP): Performed by: EMERGENCY MEDICINE

## 2025-02-21 PROCEDURE — 700111 HCHG RX REV CODE 636 W/ 250 OVERRIDE (IP): Performed by: STUDENT IN AN ORGANIZED HEALTH CARE EDUCATION/TRAINING PROGRAM

## 2025-02-21 PROCEDURE — 99232 SBSQ HOSP IP/OBS MODERATE 35: CPT | Mod: GC | Performed by: STUDENT IN AN ORGANIZED HEALTH CARE EDUCATION/TRAINING PROGRAM

## 2025-02-21 PROCEDURE — 99232 SBSQ HOSP IP/OBS MODERATE 35: CPT | Performed by: STUDENT IN AN ORGANIZED HEALTH CARE EDUCATION/TRAINING PROGRAM

## 2025-02-21 PROCEDURE — 700102 HCHG RX REV CODE 250 W/ 637 OVERRIDE(OP): Performed by: INTERNAL MEDICINE

## 2025-02-21 PROCEDURE — A9270 NON-COVERED ITEM OR SERVICE: HCPCS

## 2025-02-21 PROCEDURE — A9270 NON-COVERED ITEM OR SERVICE: HCPCS | Performed by: EMERGENCY MEDICINE

## 2025-02-21 PROCEDURE — 770001 HCHG ROOM/CARE - MED/SURG/GYN PRIV*

## 2025-02-21 PROCEDURE — 700102 HCHG RX REV CODE 250 W/ 637 OVERRIDE(OP): Performed by: STUDENT IN AN ORGANIZED HEALTH CARE EDUCATION/TRAINING PROGRAM

## 2025-02-21 PROCEDURE — 51798 US URINE CAPACITY MEASURE: CPT

## 2025-02-21 PROCEDURE — 700101 HCHG RX REV CODE 250: Performed by: STUDENT IN AN ORGANIZED HEALTH CARE EDUCATION/TRAINING PROGRAM

## 2025-02-21 RX ORDER — METHYLPHENIDATE HYDROCHLORIDE 5 MG/1
5 TABLET ORAL
Refills: 0 | Status: DISCONTINUED | OUTPATIENT
Start: 2025-02-22 | End: 2025-03-07 | Stop reason: HOSPADM

## 2025-02-21 RX ORDER — METHYLPHENIDATE HYDROCHLORIDE 5 MG/1
5 TABLET ORAL
Refills: 0 | Status: DISCONTINUED | OUTPATIENT
Start: 2025-02-22 | End: 2025-03-05

## 2025-02-21 RX ORDER — VENLAFAXINE HYDROCHLORIDE 75 MG/1
225 CAPSULE, EXTENDED RELEASE ORAL
Status: DISCONTINUED | OUTPATIENT
Start: 2025-02-22 | End: 2025-03-07 | Stop reason: HOSPADM

## 2025-02-21 RX ADMIN — SITAGLIPTIN 100 MG: 100 TABLET, FILM COATED ORAL at 05:04

## 2025-02-21 RX ADMIN — METHOCARBAMOL TABLETS 750 MG: 750 TABLET, COATED ORAL at 20:25

## 2025-02-21 RX ADMIN — DOXAZOSIN 2 MG: 2 TABLET ORAL at 20:25

## 2025-02-21 RX ADMIN — OMEPRAZOLE 40 MG: 20 CAPSULE, DELAYED RELEASE ORAL at 05:03

## 2025-02-21 RX ADMIN — DEXAMETHASONE SODIUM PHOSPHATE 4 MG: 4 INJECTION INTRA-ARTICULAR; INTRALESIONAL; INTRAMUSCULAR; INTRAVENOUS; SOFT TISSUE at 05:02

## 2025-02-21 RX ADMIN — PIOGLITAZONE 30 MG: 30 TABLET ORAL at 05:03

## 2025-02-21 RX ADMIN — VENLAFAXINE HYDROCHLORIDE 150 MG: 75 CAPSULE, EXTENDED RELEASE ORAL at 08:41

## 2025-02-21 RX ADMIN — Medication 1000 UNITS: at 05:03

## 2025-02-21 RX ADMIN — METHOCARBAMOL TABLETS 750 MG: 750 TABLET, COATED ORAL at 17:46

## 2025-02-21 RX ADMIN — ENOXAPARIN SODIUM 40 MG: 100 INJECTION SUBCUTANEOUS at 17:45

## 2025-02-21 RX ADMIN — GLIPIZIDE 10 MG: 5 TABLET ORAL at 08:40

## 2025-02-21 RX ADMIN — LIDOCAINE 2 PATCH: 4 PATCH TOPICAL at 11:28

## 2025-02-21 RX ADMIN — SENNOSIDES AND DOCUSATE SODIUM 2 TABLET: 50; 8.6 TABLET ORAL at 17:46

## 2025-02-21 RX ADMIN — GABAPENTIN 600 MG: 300 CAPSULE ORAL at 17:46

## 2025-02-21 RX ADMIN — METHYLPHENIDATE HYDROCHLORIDE 5 MG: 5 TABLET ORAL at 05:03

## 2025-02-21 RX ADMIN — METHOCARBAMOL TABLETS 750 MG: 750 TABLET, COATED ORAL at 13:12

## 2025-02-21 RX ADMIN — METHOCARBAMOL TABLETS 750 MG: 750 TABLET, COATED ORAL at 08:41

## 2025-02-21 RX ADMIN — GABAPENTIN 600 MG: 300 CAPSULE ORAL at 11:28

## 2025-02-21 RX ADMIN — GABAPENTIN 600 MG: 300 CAPSULE ORAL at 06:00

## 2025-02-21 ASSESSMENT — ENCOUNTER SYMPTOMS
BACK PAIN: 1
DEPRESSION: 1

## 2025-02-21 ASSESSMENT — PAIN DESCRIPTION - PAIN TYPE
TYPE: ACUTE PAIN

## 2025-02-21 NOTE — CONSULTS
"PSYCHIATRIC FOLLOW UP:    Reason for Admission: Urinary retention  Reason for Consult: SI and failure to thrive    Source of Information: patient and RN  Supervising Physician: Dr. Kandy Shelley    Subjective:  Patient is a 59 y.o. male with history of T2DM, degenerative disc disease, neuropathy, navicular fracture of foot, failure to thrive, prior SI 2/2 MDD who presented to the hospital for worsening back pain and urinary retention and was put on legal hold for SI.  He was seen by psychology and psychiatry was consulted for medication management and he was started on Effexor for neuropathic pain and mood and Ritalin for failure to thrive.  Per nursing report, not been agitated or requiring any as needed medications for anxiety or agitation.  When assessing patient today, he reports that his mood today is \"the same\" and when elaborating on it he reported that he still is feeling depressed and anxious about his current housing situation with his house being sold and needing to go to a skilled nursing facility not being able to be with his dog which is the only thing that means anything to me\".  When asked if his family would be able to take the dog so he can see the dog when his family comes to visit, he stated \"they barely come to visit me now, no one cares about me\".  When asked if he has had thoughts of ending his life, he stated yes, I think it would be as easy as going home and using my gun.  When stating that he would benefit from caring for his needs first as given his risk of suicide, if something were to happen to him his dog would likely be affected by this and he appeared to express some acknowledgment and understanding.  He states that he is in a lot of pain still and his sleep is poor and he would be willing to decrease his Effexor and have an as needed melatonin for sleep.    Psychiatric Examination:   Vitals: /70   Pulse 76   Temp 36.6 °C (97.9 °F) (Temporal)   Resp 15   Ht 1.829 m " "(6')   Wt 72.6 kg (160 lb)   SpO2 95%   BMI 21.70 kg/m²   Musculoskeletal: No abnormal movements noted  Appearance: well-developed, appears stated age, fair hygiene, no apparent distress, and thin, cooperative, guarded, and apathetic  Thoughts: denies HI and suicidal ideation, linear, goal-oriented, and organized**has suicidal ideations  Speech: regular rate, rhythm, volume, tone, and syntax and coherent  Mood:  \" The same as yesterday\"  Affect: dysthymic, restricted, congruent with mood, and apathetic  SI/HI: Patient reports SI and Patient reports SI with plan  Alert/Oriented: alert and oriented  Memory: no gross impairment in immediate, recent, or remote memory  Fund of Knowledge: adequate  Insight/Judgement into symptoms: poor  Neurological Testing: MMSE not performed during this encounter    Medications (currently prescribed at [facility]):    Current Facility-Administered Medications:     doxazosin (Cardura) tablet 2 mg, 2 mg, Oral, QHS, Wyatt Houston M.D., 2 mg at 02/20/25 2130    dexamethasone (Decadron) injection 4 mg, 4 mg, Intravenous, DAILY, Wyatt Houston M.D., 4 mg at 02/21/25 0502    gabapentin (Neurontin) capsule 600 mg, 600 mg, Oral, TID, Wyatt Houston M.D., 600 mg at 02/21/25 0600    methocarbamol (Robaxin) tablet 750 mg, 750 mg, Oral, 4X/DAY, Wyatt Houston M.D., 750 mg at 02/20/25 2130    [Held by provider] meloxicam (Mobic) tablet 15 mg, 15 mg, Oral, DAILY, Wyatt Houston M.D., 15 mg at 02/20/25 0414    lidocaine (Asperflex) 4 % patch 2 Patch, 2 Patch, Transdermal, Q24HR, Wyatt Houston M.D., 2 Patch at 02/20/25 1126    venlafaxine XR (Effexor XR) capsule 150 mg, 150 mg, Oral, QDAY with Breakfast, Wyatt Houston M.D., 150 mg at 02/20/25 0900    methylphenidate (Ritalin) tablet 5 mg, 5 mg, Oral, BID, Wyatt Houston M.D., 5 mg at 02/21/25 0503    vitamin D3 (Cholecalciferol) tablet 1,000 Units, 1,000 Units, Oral, DAILY, Ayo Fernandez M.D., 1,000 Units at 02/21/25 0503    glipiZIDE " (Glucotrol) tablet 10 mg, 10 mg, Oral, QAM AC, JOSY KimO., 10 mg at 02/20/25 0901    pioglitazone (Actos) tablet 30 mg, 30 mg, Oral, DAILY, JOSY KimOOlga, 30 mg at 02/21/25 0503    SITagliptin (Januvia) tablet 100 mg, 100 mg, Oral, DAILY, JOSY KimOOlga, 100 mg at 02/21/25 0504    loperamide (Imodium) capsule 2 mg, 2 mg, Oral, 4X/DAY PRN, JOSY KimOOlga, 2 mg at 02/20/25 1804    omeprazole (PriLOSEC) capsule 40 mg, 40 mg, Oral, DAILY, JOSY SilvaOOlga, 40 mg at 02/21/25 0503    enoxaparin (Lovenox) inj 40 mg, 40 mg, Subcutaneous, DAILY AT 1800, Anatoliy Eagle D.O., 40 mg at 02/20/25 1757    senna-docusate (Pericolace Or Senokot S) 8.6-50 MG per tablet 2 Tablet, 2 Tablet, Oral, Q EVENING, 2 Tablet at 02/19/25 1650 **AND** polyethylene glycol/lytes (Miralax) Packet 1 Packet, 1 Packet, Oral, QDAY PRN, Anatoliy Eagle D.O.    Pharmacy Consult Request ...Pain Management Review 1 Each, 1 Each, Other, PHARMACY TO DOSE, Anatoliy Eagle D.O.    ondansetron (Zofran) syringe/vial injection 4 mg, 4 mg, Intravenous, Q4HRS PRN, JOSY KimOOlga, 4 mg at 02/11/25 0948    [DISCONTINUED] insulin lispro (HumaLOG,AdmeLOG) subcutaneous injection, 3-14 Units, Subcutaneous, 4X/DAY ACHS, 4 Units at 02/17/25 2212 **AND** [CANCELED] POC blood glucose manual result, , , Q AC AND BEDTIME(S) **AND** NOTIFY MD and PharmD, , , Once **AND** Administer 20 grams of glucose (approximately 8 ounces of fruit juice) every 15 minutes PRN FSBG less than 70 mg/dL, , , PRN **AND** dextrose 50% (D50W) injection 25 g, 25 g, Intravenous, Q15 MIN PRN, Rico Peters D.O.  Labs: No new labs     Results for orders placed or performed during the hospital encounter of 02/10/25   EKG   Result Value Ref Range    Report       [facility] Emergency Dept.    Test Date:  2025-02-10  Pt Name:    BAILEE JACOBSON               Department: ER  MRN:        [id number]                      Room:         27  Gender:     Male                         Technician: [id number]  :        1965                   Requested By:ER TRIAGE PROTOCOL  Order #:    [id number]                    Reading MD: MERISSA TORRES DO    Measurements  Intervals                                Axis  Rate:       70                           P:          61  SD:         169                          QRS:        60  QRSD:       91                           T:          70  QT:         420  QTc:        454    Interpretive Statements  Sinus rhythm  Borderline low voltage, extremity leads  Compared to ECG 2025 12:50:26  No significant changes  Electronically Signed On 02- 16:26:09 PST by MERISSA TORRES DO            Assessment:  Patient is a 59-year-old male with history of type 2 diabetes mellitus, degenerative disc disease, neuropathy, failure to thrive and SI secondary to MDD who is being hospitalized for pain management and is on a legal hold for SI with a plan.  He continues to express SI and hopelessness and perseverates between wanting to go home to be with his dog and feeling hopeless about his situation caring for himself with his health conditions and appears to be apathetic toward walking around due to pain and an injury of his foot.  He is amenable at this time to try walking around and continue physical therapy with the use of his AFO after he is ortho statically stable per his primary team. At this time, it appears that his chronic pain and poor coping skills are contributing to his feelings of hopelessness and he would ideally benefit from psychotherapy to learn affective coping skills and may benefit from IP psychiatric hospitalization to become established with outpatient psychiatric care if he is able to ambulate and eventually discontinued off of his [place]. He appears to be tolerating his Effexor and Ritalin without side effects but may benefit from further optimization of his Effexor.  Additionally, with  his reported insomnia, he he may benefit from an as needed sleeping aid such as melatonin.  Will plan to continue to assess his mental status and reassess his legal hold.    Diagnosis  Major depressive disorder, severe  R/O Cluster B personality traits    Plan:  - Continue Legal hold for SI with plan  -Continue Ritalin 5mg QAM and 5mg Qlunch  - Recommend increasing Effexor from 150mg to 225mg daily for MDD  Continue vitamin D supplementation  - Reviewed risks, benefits, alternatives to include no treatment, therapy and medications  -Please transfer to psychiatric hospital when medically cleared and there is a bed available  -Need to confirm removal of firearms from home until legal hold is discontinued  -Discussed case with Dr. Houston and Dr. Shelley    Will continue to follow patient

## 2025-02-21 NOTE — DISCHARGE PLANNING
Alert Team/Behavioral Health   Note:      - [facility]: Talked to Naeem in adult psych; transferred to geriatric psych as due to patient age, referral to be reviewed for geriatric psych. Talked to Amanda in geriatric psych intake. Patient declined due to no accepting doctor.    - [facility] : Talked to [facility]. Re-sent referral as they only have MAR. No beds currently available.

## 2025-02-21 NOTE — CARE PLAN
The patient is Stable - Low risk of patient condition declining or worsening    Shift Goals  Clinical Goals: pain management, post void residuals  Patient Goals: pain management  Family Goals: not present    Progress made toward(s) clinical / shift goals:  pt has been medicated per MAR for pain. Resting comfortably in bed. Rates right shoulder pain 9/10. Heat pack provided. Notified Dr. Houston. No new orders received. 1:1 sitter in use. Legal hold protocol in use.      Problem: Pain - Standard  Goal: Alleviation of pain or a reduction in pain to the patient’s comfort goal  Description: Target End Date:  Prior to discharge or change in level of care    Document on Vitals flowsheet    1.  Document pain using the appropriate pain scale per order or unit policy  2.  Educate and implement non-pharmacologic comfort measures (i.e. relaxation, distraction, massage, cold/heat therapy, etc.)  3.  Pain management medications as ordered  4.  Reassess pain after pain med administration per policy  5.  If opiods administered assess patient's response to pain medication is appropriate per POSS sedation scale  6.  Follow pain management plan developed in collaboration with patient and interdisciplinary team (including palliative care or pain specialists if applicable)  Outcome: Progressing     Problem: Provide Safe Environment  Goal: Suicide environmental safety, protocols, policies, and practices will be implemented  Description: Target End Date:  resolve day 1    1.  Remove objects or personal belongings that may cause harm or injury to self or others  2.  Dietary tray modifications (paperware)  3.  Provide a safe environment  4.  Render close patient supervision by sustaining observation or awareness of the patient at all times  Outcome: Progressing

## 2025-02-21 NOTE — DISCHARGE PLANNING
Medical Social Work  PC from [facility] called to say that the realtor has found a  and is willing to pay $15,000 as is.  That she called her brother to see what he thought of this.  That patient told her he wants his dog.  When [facility] told him that he can't due to where he will be living.  Told her to just fucking kill the dog and hung up on her.      SW was asked to give an update on patient's legal hold.  MYRON stated that patient told his nurse that he wishes he was dead.  [facility] stated she feels he will continue to say this until he gets what he wants.  SW asked what he wants.  [facility] stated to live in his trailer with his dog and have 24 hour care. Some one to take care of him.  [facility] realizes that this is not a possibility.

## 2025-02-21 NOTE — CARE PLAN
The patient is Stable - Low risk of patient condition declining or worsening    Shift Goals  Clinical Goals: Pain control, bladder function  Patient Goals: Pain control  Family Goals: vicente    Progress made toward(s) clinical / shift goals:      Problem: Pain - Standard  Goal: Alleviation of pain or a reduction in pain to the patient’s comfort goal  Outcome: Progressing     Problem: Knowledge Deficit - Standard  Goal: Patient and family/care givers will demonstrate understanding of plan of care, disease process/condition, diagnostic tests and medications  Outcome: Progressing     Problem: Provide Safe Environment  Goal: Suicide environmental safety, protocols, policies, and practices will be implemented  Outcome: Progressing     Problem: Psychosocial  Goal: Patient's ability to identify and develop effective coping behaviors will improve  Outcome: Progressing  Goal: Patient's ability to identify and utilize available support systems will improve  Outcome: Progressing     Problem: Skin Integrity  Goal: Skin integrity is maintained or improved  Outcome: Progressing     Problem: Infection - Standard  Goal: Patient will remain free from infection  Outcome: Progressing       Patient is not progressing towards the following goals:

## 2025-02-22 LAB — CORTIS SERPL-MCNC: 1 UG/DL (ref 0–23)

## 2025-02-22 PROCEDURE — A9270 NON-COVERED ITEM OR SERVICE: HCPCS | Performed by: STUDENT IN AN ORGANIZED HEALTH CARE EDUCATION/TRAINING PROGRAM

## 2025-02-22 PROCEDURE — 700102 HCHG RX REV CODE 250 W/ 637 OVERRIDE(OP): Performed by: EMERGENCY MEDICINE

## 2025-02-22 PROCEDURE — 82533 TOTAL CORTISOL: CPT

## 2025-02-22 PROCEDURE — 700111 HCHG RX REV CODE 636 W/ 250 OVERRIDE (IP): Mod: JZ

## 2025-02-22 PROCEDURE — 700102 HCHG RX REV CODE 250 W/ 637 OVERRIDE(OP): Performed by: STUDENT IN AN ORGANIZED HEALTH CARE EDUCATION/TRAINING PROGRAM

## 2025-02-22 PROCEDURE — 99232 SBSQ HOSP IP/OBS MODERATE 35: CPT | Performed by: STUDENT IN AN ORGANIZED HEALTH CARE EDUCATION/TRAINING PROGRAM

## 2025-02-22 PROCEDURE — A9270 NON-COVERED ITEM OR SERVICE: HCPCS | Performed by: EMERGENCY MEDICINE

## 2025-02-22 PROCEDURE — A9270 NON-COVERED ITEM OR SERVICE: HCPCS

## 2025-02-22 PROCEDURE — 700102 HCHG RX REV CODE 250 W/ 637 OVERRIDE(OP)

## 2025-02-22 PROCEDURE — 770001 HCHG ROOM/CARE - MED/SURG/GYN PRIV*

## 2025-02-22 PROCEDURE — 700101 HCHG RX REV CODE 250: Performed by: STUDENT IN AN ORGANIZED HEALTH CARE EDUCATION/TRAINING PROGRAM

## 2025-02-22 PROCEDURE — A9270 NON-COVERED ITEM OR SERVICE: HCPCS | Performed by: INTERNAL MEDICINE

## 2025-02-22 PROCEDURE — 700111 HCHG RX REV CODE 636 W/ 250 OVERRIDE (IP): Performed by: STUDENT IN AN ORGANIZED HEALTH CARE EDUCATION/TRAINING PROGRAM

## 2025-02-22 PROCEDURE — 51798 US URINE CAPACITY MEASURE: CPT

## 2025-02-22 PROCEDURE — 700102 HCHG RX REV CODE 250 W/ 637 OVERRIDE(OP): Performed by: INTERNAL MEDICINE

## 2025-02-22 PROCEDURE — 36415 COLL VENOUS BLD VENIPUNCTURE: CPT

## 2025-02-22 RX ADMIN — SITAGLIPTIN 100 MG: 100 TABLET, FILM COATED ORAL at 05:04

## 2025-02-22 RX ADMIN — GABAPENTIN 600 MG: 300 CAPSULE ORAL at 13:21

## 2025-02-22 RX ADMIN — ENOXAPARIN SODIUM 40 MG: 100 INJECTION SUBCUTANEOUS at 18:25

## 2025-02-22 RX ADMIN — DOXAZOSIN 2 MG: 2 TABLET ORAL at 20:27

## 2025-02-22 RX ADMIN — METHOCARBAMOL TABLETS 750 MG: 750 TABLET, COATED ORAL at 15:27

## 2025-02-22 RX ADMIN — GLIPIZIDE 10 MG: 5 TABLET ORAL at 09:38

## 2025-02-22 RX ADMIN — LIDOCAINE 2 PATCH: 4 PATCH TOPICAL at 13:21

## 2025-02-22 RX ADMIN — GABAPENTIN 600 MG: 300 CAPSULE ORAL at 18:25

## 2025-02-22 RX ADMIN — PIOGLITAZONE 30 MG: 30 TABLET ORAL at 05:04

## 2025-02-22 RX ADMIN — Medication 10 MG: at 20:26

## 2025-02-22 RX ADMIN — GABAPENTIN 600 MG: 300 CAPSULE ORAL at 05:04

## 2025-02-22 RX ADMIN — OMEPRAZOLE 40 MG: 20 CAPSULE, DELAYED RELEASE ORAL at 05:04

## 2025-02-22 RX ADMIN — METHOCARBAMOL TABLETS 750 MG: 750 TABLET, COATED ORAL at 18:25

## 2025-02-22 RX ADMIN — SENNOSIDES AND DOCUSATE SODIUM 2 TABLET: 50; 8.6 TABLET ORAL at 18:25

## 2025-02-22 RX ADMIN — METHYLPHENIDATE HYDROCHLORIDE 5 MG: 5 TABLET ORAL at 09:38

## 2025-02-22 RX ADMIN — METHOCARBAMOL TABLETS 750 MG: 750 TABLET, COATED ORAL at 21:00

## 2025-02-22 RX ADMIN — VENLAFAXINE HYDROCHLORIDE 225 MG: 75 CAPSULE, EXTENDED RELEASE ORAL at 09:37

## 2025-02-22 RX ADMIN — DEXAMETHASONE SODIUM PHOSPHATE 4 MG: 4 INJECTION INTRA-ARTICULAR; INTRALESIONAL; INTRAMUSCULAR; INTRAVENOUS; SOFT TISSUE at 05:04

## 2025-02-22 RX ADMIN — METHOCARBAMOL TABLETS 750 MG: 750 TABLET, COATED ORAL at 09:37

## 2025-02-22 RX ADMIN — METHYLPHENIDATE HYDROCHLORIDE 5 MG: 5 TABLET ORAL at 15:24

## 2025-02-22 RX ADMIN — Medication 1000 UNITS: at 05:04

## 2025-02-22 ASSESSMENT — ENCOUNTER SYMPTOMS
LOSS OF CONSCIOUSNESS: 0
DIZZINESS: 0
WEAKNESS: 0
BACK PAIN: 1

## 2025-02-22 ASSESSMENT — PAIN DESCRIPTION - PAIN TYPE
TYPE: ACUTE PAIN
TYPE: ACUTE PAIN

## 2025-02-22 NOTE — PROGRESS NOTES
[facility] Medicine Daily Progress Note    Date of Service  2/21/2025    Chief Complaint  Christoph Taylor is a 59 y.o. male admitted 2/10/2025 with   Chief Complaint   Patient presents with    T-5000 FALL    Back Pain         Hospital Course  Patient is a 59-year-old male with extensive past medical history of poorly controlled type 2 diabetes mellitus, failure to thrive, and prior suicidal ideations secondary to major depressive disorder as well as his chronic back pain status post multiple laminectomies and fractures who presents due to recurrent falls as well as progressive worsening back pain. Patient was initially held in the emergency department due to suicidal ideation, did not meet any admission criteria, and had progressive worsening of his bilateral lower extremity. Patient reports he chronically has fecal incontinence for the past several months, no other new neurologic symptoms. However, patient does report new onset of acute urinary retention prompting ER provider to order stat MRI of thoracic and lumbar spine due to previous evidence of cord compression.     MRI of thoracic and lumbar showed chronic compression deformity at T12.  Postsurgical and degenerative changes.  No acute fracture.  No significant interval change.    Patient had persistent urinary retention requiring Crowley catheter placement.  Started on tamsulosin.    Patient has difficulty taking care of himself.  He has difficulty managing his diabetes.  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  Also states decreased range of motion and flexibility, dexterity of his bilateral hands which is previous cervical surgery was supposed to fix.  States he has a lot of trouble with glucometer, lancets, using insulin pens.  He was previously switched to oral hypoglycemic agents but still was not under good control and he would still like to be able to check his blood sugar because it dropped severely low previously while he  "was at [place].  He apparently started having convulsions and they thought a cardiac arrest.  He also has poor vision making very difficult to do manage his meds and glucometer.  Previously saw optometry and diagnosed with diabetic retinopathy and cataracts.    Was placed on legal hold for suicidal ideations.    Interval Problem Update    HENRI ON  He reports worsening Right shoulder pain with raising it up.  Confirmed supraspinatus / biceps head etiology of rotator cuff.  No change in back pain with initiation of steroids. Will monitor another day for efficacy.  He wants to get back to his home to be with his dog.  He is concerned the dog and he will be discarded \"like trash.\"  Advised that he can motivate himself to get better if he wants to go home. Needs to safety plan with Psychiatry.  Spontaneously voiding, -400 will continue to monitor.    POC discussed with Psychiatry resident Dr. Castillo. Will increase effexor. Poor coping skills.     No new labs nor imaging.    I have discussed this patient's plan of care and discharge plan at IDT rounds today with Case Management, Nursing, Nursing leadership, and other members of the IDT team.    Consultants/Specialty  psychiatry    Code Status  Full Code    Disposition  The patient is not medically cleared for discharge to home or a post-acute facility.  Anticipate discharge to: a psychiatric hospital    I have placed the appropriate orders for post-discharge needs.    Review of Systems  Review of Systems   Musculoskeletal:  Positive for back pain and joint pain.   Psychiatric/Behavioral:  Positive for depression and suicidal ideas.         Physical Exam  Temp:  [36.1 °C (96.9 °F)-36.7 °C (98.1 °F)] 36.1 °C (96.9 °F)  Pulse:  [76-93] 93  Resp:  [15-18] 16  BP: (129-146)/(70-94) 136/94  SpO2:  [93 %-96 %] 96 %    Physical Exam  Vitals and nursing note reviewed. Exam conducted with a chaperone present (Sitter at bedside).   Constitutional:       General: He is not in " acute distress.     Appearance: Normal appearance. He is not ill-appearing, toxic-appearing or diaphoretic.   HENT:      Head: Normocephalic.      Ears:      Comments: +Presbycusis     Nose: Nose normal.      Mouth/Throat:      Mouth: Mucous membranes are moist.   Eyes:      General: No scleral icterus.     Conjunctiva/sclera: Conjunctivae normal.   Pulmonary:      Effort: Pulmonary effort is normal. No respiratory distress.   Genitourinary:     Comments: No jiménez  Musculoskeletal:      Comments: Pain elicited with abduction of Right shoulder and internal rotation   Skin:     General: Skin is warm and dry.   Neurological:      Mental Status: He is alert.      Comments: Appropriately conversant, moves all extremities   Psychiatric:         Attention and Perception: Attention and perception normal.         Mood and Affect: Affect normal. Mood is depressed.         Speech: Speech normal.         Behavior: Behavior is withdrawn. Behavior is cooperative.         Thought Content: Thought content normal.         Cognition and Memory: Cognition and memory normal.         Judgment: Judgment normal.         Fluids    Intake/Output Summary (Last 24 hours) at 2/21/2025 1807  Last data filed at 2/21/2025 0955  Gross per 24 hour   Intake 237 ml   Output 2200 ml   Net -1963 ml        Laboratory                            Imaging  [id number] WITH   Final Result      1.  Unremarkable orbits.   2.  No facial soft tissue swelling or mass.   3.  Paranasal sinuses are clear.      MR-THORACIC SPINE-W/O   Final Result      1.  Moderate chronic compression deformity at T12.   2.  No acute fracture.   3.  Multifocal degenerative disease as described above.   4.  There has been no significant interval change.      MR-LUMBAR SPINE-W/O   Final Result      1.  Postsurgical and degenerative changes as described above.   2.  Moderate chronic compression deformity at T12.   3.  No acute abnormality.   4.  There has been no significant  interval change.      CT-LSPINE W/O PLUS RECONS   Final Result      1. No acute fracture or subluxation involving the lumbar spine.   2. Stable anterior wedge compression deformity of the superior endplate of T12.   3. Stable degenerative grade 1 anterolisthesis of L4 with respect to L3.   4. New postsurgical changes of posterior lateral interbody fusion at L4-5.   5. Horseshoe kidney.   6. Circumferential bladder wall thickening.      CT-TSPINE W/O PLUS RECONS   Final Result      Chronic appearing fractures without a definite acute fracture or change in alignment.      CT-CSPINE WITHOUT PLUS RECONS   Final Result      1. No acute osseous injury of the cervical spine.   2. Stable postsurgical changes of ACDF from C4 through C7.   3. Stable multilevel cervical spondylosis.      CT-HEAD W/O   Final Result      1. Age-related central and cortical atrophy.   2. No acute intracranial abnormality.                    Assessment/Plan  * Urinary retention- (present on admission)  Assessment & Plan  Improved  Patient with acute urinary retention after fall, given extensive back history concern for potential cord abnormality.    Transitioned to doxazosin  Bladder scan, PVR with straight cath >400    Chronic midline back pain- (present on admission)  Assessment & Plan  MRI demonstrates chronic compression fracture and degenerative changes - nonoperative management  Opiates contraindicated due to urinary retention, chronic pain, and mood DO  Multimodal regimen:  Increased gabapentin to 600 mg TID  Scheduled NSAID - HELD DURING STEROIDS  SNRI - venlafaxine  Scheduled Muscle relaxant - robaxin QID  Lidoderm topical therapy  Warm compress  Psychiatry consultation for CBD  Refractory - initiating 5-day course of steroids 2/20    Orthostatic hypotension  Assessment & Plan  Recurrent  Exacerbated by high-dose tamsulosin  Transitioned to doxazosin QHS for better tolerability    Proliferative diabetic retinopathy of both eyes without  macular edema associated with type 2 diabetes mellitus (HCC)- (present on admission)  Assessment & Plan  Complains of worsening vision  Patient Previous saw Soraida Dianna, OD on 10/5/2024.  Exam is uploaded into the media tab.  At that time his right eye visual acuity was 20/40-2, left eye 20/150-1.  Referral was placed to [facility] for diabetic retinopathy and cataracts but patient has been unable to follow-up.  Patient complained of progressively worsening vision more acutely over the past few days.  He had a CT orbits on 2/16 which was unremarkable  Follow up with outpatient ophthalmologist    Frequent falls- (present on admission)  Assessment & Plan  Patient with frequent falls due to chronic and progressively worsening bilateral lower extremity weakness with multiple different spinal surgeries.  Patient does report chronic bowel incontinence and recently has developed urinary retention after previous fall with concern for potential cord compression/abnormality.  Lower extremity weakness 4+ out of 5, likely generalized, however asked by ER provider to admit for MRI evaluation  -MRI of thoracic and lumbar spine show chronic compression fracture at T12, degenerative changes.  No acute fracture  Neurosurgery has recommended for outpatient follow-up and possible epidural injections  - PT/OT recommend post-acute, not a candidate due to legal hold for SI    Suicidal ideation- (present on admission)  Assessment & Plan  Currently on legal hold for suicidal ideation due to his current health  Psychiatry consulted, extended legal hold  He has not contested, expect 1 week extension    Acute hypoxic respiratory failure (HCC)- (present on admission)  Assessment & Plan  Resolved    Uncontrolled type 2 diabetes mellitus with hyperglycemia (HCC)- (present on admission)  Assessment & Plan  A1c 9.8  Insulin discontinued due to difficulty managing with impaired dexterity / vision and risk of hypoglycemia  Also is a barrier  to inpatient psychiatry       VTE prophylaxis: Lovenox    I have performed a physical exam and reviewed and updated ROS and Plan today (2/21/2025). In review of yesterday's note (2/20/2025), there are no changes except as documented above.

## 2025-02-22 NOTE — DISCHARGE PLANNING
Alert Team/Behavioral Health   Note:       - Rustam BOLAND: Talked to Antonia. No beds currently available.

## 2025-02-22 NOTE — CARE PLAN
The patient is Stable - Low risk of patient condition declining or worsening    Shift Goals  Clinical Goals: safety, pain control  Patient Goals: rest, comfort  Family Goals: no family present    Progress made toward(s) clinical / shift goals:    Problem: Pain - Standard  Goal: Alleviation of pain or a reduction in pain to the patient’s comfort goal  Outcome: Progressing    Administer pain medications per MAR.     Problem: Knowledge Deficit - Standard  Goal: Patient and family/care givers will demonstrate understanding of plan of care, disease process/condition, diagnostic tests and medications  Outcome: Progressing    Educated the patient regarding the plan of care.       Patient is not progressing towards the following goals:

## 2025-02-22 NOTE — PROGRESS NOTES
[facility] Medicine Daily Progress Note    Date of Service  2/22/2025    Chief Complaint  Christoph Taylor is a 59 y.o. male admitted 2/10/2025 with   Chief Complaint   Patient presents with    T-5000 FALL    Back Pain         Hospital Course  Patient is a 59-year-old male with extensive past medical history of poorly controlled type 2 diabetes mellitus, failure to thrive, and prior suicidal ideations secondary to major depressive disorder as well as his chronic back pain status post multiple laminectomies and fractures who presents due to recurrent falls as well as progressive worsening back pain. Patient was initially held in the emergency department due to suicidal ideation, did not meet any admission criteria, and had progressive worsening of his bilateral lower extremity. Patient reports he chronically has fecal incontinence for the past several months, no other new neurologic symptoms. However, patient does report new onset of acute urinary retention prompting ER provider to order stat MRI of thoracic and lumbar spine due to previous evidence of cord compression.     MRI of thoracic and lumbar showed chronic compression deformity at T12.  Postsurgical and degenerative changes.  No acute fracture.  No significant interval change.    Patient had persistent urinary retention requiring Crowley catheter placement.  Started on tamsulosin.    Patient has difficulty taking care of himself.  He has difficulty managing his diabetes.  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  Also states decreased range of motion and flexibility, dexterity of his bilateral hands which is previous cervical surgery was supposed to fix.  States he has a lot of trouble with glucometer, lancets, using insulin pens.  He was previously switched to oral hypoglycemic agents but still was not under good control and he would still like to be able to check his blood sugar because it dropped severely low previously while he  was at [place].  He apparently started having convulsions and they thought a cardiac arrest.  He also has poor vision making very difficult to do manage his meds and glucometer.  Previously saw optometry and diagnosed with diabetic retinopathy and cataracts.    Was placed on legal hold for suicidal ideations.    Interval Problem Update    HENRI ON  He reports ongoing Right shoulder pain.  He denies presyncope, syncope.  He is urinating well >700 cc  PVR remains 300-400.  Will get orthostatic vitals today and determine if dose adjustment of doxazosin is necessary.    No new labs nor imaging.    I have discussed this patient's plan of care and discharge plan at IDT rounds today with Case Management, Nursing, Nursing leadership, and other members of the IDT team.    Consultants/Specialty  psychiatry    Code Status  Full Code    Disposition  The patient is not medically cleared for discharge to home or a post-acute facility.  Anticipate discharge to: a psychiatric hospital    I have placed the appropriate orders for post-discharge needs.    Review of Systems  Review of Systems   Musculoskeletal:  Positive for back pain and joint pain.   Neurological:  Negative for dizziness, loss of consciousness and weakness.        Physical Exam  Temp:  [36.1 °C (96.9 °F)-36.6 °C (97.9 °F)] 36.6 °C (97.9 °F)  Pulse:  [71-93] 92  Resp:  [16-18] 16  BP: (104-136)/(69-94) 120/75  SpO2:  [93 %-96 %] 96 %    Physical Exam  Vitals and nursing note reviewed. Exam conducted with a chaperone present (Sitter at bedside).   Constitutional:       General: He is not in acute distress.     Appearance: Normal appearance. He is not ill-appearing, toxic-appearing or diaphoretic.   HENT:      Head: Normocephalic.      Ears:      Comments: +Presbycusis     Nose: Nose normal.      Mouth/Throat:      Mouth: Mucous membranes are moist.   Eyes:      General: No scleral icterus.     Conjunctiva/sclera: Conjunctivae normal.   Pulmonary:      Effort: Pulmonary  effort is normal. No respiratory distress.   Genitourinary:     Comments: No jiménez  Skin:     General: Skin is warm and dry.   Neurological:      Mental Status: He is alert.      Comments: Appropriately conversant, moves all extremities   Psychiatric:         Attention and Perception: Attention and perception normal.         Mood and Affect: Mood normal. Affect is blunt.         Speech: Speech normal.         Behavior: Behavior normal. Behavior is cooperative.         Thought Content: Thought content normal.         Cognition and Memory: Cognition and memory normal.         Judgment: Judgment normal.       Fluids    Intake/Output Summary (Last 24 hours) at 2/22/2025 1240  Last data filed at 2/22/2025 0521  Gross per 24 hour   Intake --   Output 750 ml   Net -750 ml      Laboratory                            Imaging  [id number] WITH   Final Result      1.  Unremarkable orbits.   2.  No facial soft tissue swelling or mass.   3.  Paranasal sinuses are clear.      MR-THORACIC SPINE-W/O   Final Result      1.  Moderate chronic compression deformity at T12.   2.  No acute fracture.   3.  Multifocal degenerative disease as described above.   4.  There has been no significant interval change.      MR-LUMBAR SPINE-W/O   Final Result      1.  Postsurgical and degenerative changes as described above.   2.  Moderate chronic compression deformity at T12.   3.  No acute abnormality.   4.  There has been no significant interval change.      CT-LSPINE W/O PLUS RECONS   Final Result      1. No acute fracture or subluxation involving the lumbar spine.   2. Stable anterior wedge compression deformity of the superior endplate of T12.   3. Stable degenerative grade 1 anterolisthesis of L4 with respect to L3.   4. New postsurgical changes of posterior lateral interbody fusion at L4-5.   5. Horseshoe kidney.   6. Circumferential bladder wall thickening.      CT-TSPINE W/O PLUS RECONS   Final Result      Chronic appearing fractures  without a definite acute fracture or change in alignment.      CT-CSPINE WITHOUT PLUS RECONS   Final Result      1. No acute osseous injury of the cervical spine.   2. Stable postsurgical changes of ACDF from C4 through C7.   3. Stable multilevel cervical spondylosis.      CT-HEAD W/O   Final Result      1. Age-related central and cortical atrophy.   2. No acute intracranial abnormality.                    Assessment/Plan  * Urinary retention- (present on admission)  Assessment & Plan  Improved  Patient with acute urinary retention after fall, given extensive back history concern for potential cord abnormality.    Transitioned to doxazosin  Bladder scan, PVR with straight cath >400    Chronic midline back pain- (present on admission)  Assessment & Plan  MRI demonstrates chronic compression fracture and degenerative changes - nonoperative management  Opiates contraindicated due to urinary retention, chronic pain, and mood DO  Multimodal regimen:  Increased gabapentin to 600 mg TID  Scheduled NSAID - HELD DURING STEROIDS  SNRI - venlafaxine  Scheduled Muscle relaxant - robaxin QID  Lidoderm topical therapy  Warm compress  Psychiatry consultation for CBD  Refractory - initiating 5-day course of steroids 2/20    Orthostatic hypotension  Assessment & Plan  Recurrent  Exacerbated by high-dose tamsulosin  Transitioned to doxazosin QHS for better tolerability  Orthostatic VS and IVF boluses as needed    Proliferative diabetic retinopathy of both eyes without macular edema associated with type 2 diabetes mellitus (HCC)- (present on admission)  Assessment & Plan  Complains of worsening vision  Patient Previous saw Soraida Bustamante OD on 10/5/2024.  Exam is uploaded into the media tab.  At that time his right eye visual acuity was 20/40-2, left eye 20/150-1.  Referral was placed to [facility] for diabetic retinopathy and cataracts but patient has been unable to follow-up.  Patient complained of progressively worsening  vision more acutely over the past few days.  He had a CT orbits on 2/16 which was unremarkable  Follow up with outpatient ophthalmologist    Frequent falls- (present on admission)  Assessment & Plan  Patient with frequent falls due to chronic and progressively worsening bilateral lower extremity weakness with multiple different spinal surgeries.  Patient does report chronic bowel incontinence and recently has developed urinary retention after previous fall with concern for potential cord compression/abnormality.  Lower extremity weakness 4+ out of 5, likely generalized, however asked by ER provider to admit for MRI evaluation  -MRI of thoracic and lumbar spine show chronic compression fracture at T12, degenerative changes.  No acute fracture  Neurosurgery has recommended for outpatient follow-up and possible epidural injections  - PT/OT recommend post-acute, not a candidate due to legal hold for SI    Suicidal ideation- (present on admission)  Assessment & Plan  Currently on legal hold for suicidal ideation due to his current health  Psychiatry consulted, extended legal hold  He has not contested, expect 1 week extension    Acute hypoxic respiratory failure (HCC)- (present on admission)  Assessment & Plan  Resolved    Uncontrolled type 2 diabetes mellitus with hyperglycemia (HCC)- (present on admission)  Assessment & Plan  A1c 9.8  Insulin discontinued due to difficulty managing with impaired dexterity / vision and risk of hypoglycemia  Initiate additional pharmacotherapy (SGLT2, GLP1, etc) after discharge       VTE prophylaxis: Lovenox    I have performed a physical exam and reviewed and updated ROS and Plan today (2/22/2025). In review of yesterday's note (2/21/2025), there are no changes except as documented above.

## 2025-02-22 NOTE — DOCUMENTATION QUERY
[facility]                                                                       Query Response Note      PATIENT:               BAILEE JACOBSON  ACCT #:                  [id number]  MRN:                     [id number]  :                      1965  ADMIT DATE:       2/10/2025 12:39 PM  DISCH DATE:          RESPONDING  PROVIDER #:        [id number]           QUERY TEXT:    Patient with acute urinary retention after fall. Concern for potential cord abnormality is documented throughout the record. Per ED note, MRI shows old T12 compression fracture without quinton central canal stenosis or cord impingement.    Please clarify the clinical/diagnostic relevance for the documented findings.    The patient's clinical indicators include:  :  -H&P:  acute urinary retention after fall, given extensive back history concern for potential cord abnormality; worsening back pain; Lower extremity weakness 4+ out of 5  -MRI thoracic and lumbar notable for old compression fracture at T12 and multiple disc bulging without quinton central canal stenosis or cord impingement.    Risk Factors: Previous T 12 compression fracture/cord compression, chronic back pain, multiple laminectomies  Treatments: MRI, Crowley catheter, gabapentin, PT/OT, Doxazosin      Contact me with any questions.    Thank you for your time and attention,  Izabel Ratliff RN, CDI  [email or web address]  Connect via email, Voalte or messenger.    Note: If you agree with a listed diagnosis, please remember to include it in your concurrent daily documentation and onto the Discharge Summary.  Options provided:   -- Urinary rention is due to or associated with spinal cord compression   -- Spinal cord compression ruled out   -- Other explanation, (Please specify the other explanation)   -- Unable to determine      Query created by: Izabel Ratliff on 2025 1:29 PM    RESPONSE TEXT:    Spinal cord  compression ruled out          Electronically signed by:  ARY SHUKLA MD 2/22/2025 6:19 AM

## 2025-02-22 NOTE — CARE PLAN
The patient is Stable - Low risk of patient condition declining or worsening    Shift Goals  Clinical Goals: safety, comfort, mobility  Patient Goals: pain control, comfort  Family Goals: not present    Progress made toward(s) clinical / shift goals:    Problem: Pain - Standard  Goal: Alleviation of pain or a reduction in pain to the patient’s comfort goal  Outcome: Progressing     Problem: Knowledge Deficit - Standard  Goal: Patient and family/care givers will demonstrate understanding of plan of care, disease process/condition, diagnostic tests and medications  Outcome: Progressing     Problem: Provide Safe Environment  Goal: Suicide environmental safety, protocols, policies, and practices will be implemented  Outcome: Progressing     Problem: Psychosocial  Goal: Patient's ability to identify and develop effective coping behaviors will improve  Outcome: Progressing  Goal: Patient's ability to identify and utilize available support systems will improve  Outcome: Progressing     Problem: Skin Integrity  Goal: Skin integrity is maintained or improved  Outcome: Progressing     Problem: Infection - Standard  Goal: Patient will remain free from infection  Outcome: Progressing       Patient is not progressing towards the following goals:

## 2025-02-23 LAB
CORTIS SERPL-MCNC: 15 UG/DL (ref 0–23)
CORTIS SERPL-MCNC: 2.1 UG/DL (ref 0–23)
CORTIS SERPL-MCNC: 20.2 UG/DL (ref 0–23)

## 2025-02-23 PROCEDURE — 700101 HCHG RX REV CODE 250: Performed by: STUDENT IN AN ORGANIZED HEALTH CARE EDUCATION/TRAINING PROGRAM

## 2025-02-23 PROCEDURE — 99232 SBSQ HOSP IP/OBS MODERATE 35: CPT | Performed by: STUDENT IN AN ORGANIZED HEALTH CARE EDUCATION/TRAINING PROGRAM

## 2025-02-23 PROCEDURE — A9270 NON-COVERED ITEM OR SERVICE: HCPCS

## 2025-02-23 PROCEDURE — 36415 COLL VENOUS BLD VENIPUNCTURE: CPT

## 2025-02-23 PROCEDURE — A9270 NON-COVERED ITEM OR SERVICE: HCPCS | Performed by: STUDENT IN AN ORGANIZED HEALTH CARE EDUCATION/TRAINING PROGRAM

## 2025-02-23 PROCEDURE — 700111 HCHG RX REV CODE 636 W/ 250 OVERRIDE (IP): Mod: JZ

## 2025-02-23 PROCEDURE — 700102 HCHG RX REV CODE 250 W/ 637 OVERRIDE(OP)

## 2025-02-23 PROCEDURE — A9270 NON-COVERED ITEM OR SERVICE: HCPCS | Performed by: INTERNAL MEDICINE

## 2025-02-23 PROCEDURE — 700102 HCHG RX REV CODE 250 W/ 637 OVERRIDE(OP): Performed by: INTERNAL MEDICINE

## 2025-02-23 PROCEDURE — 700111 HCHG RX REV CODE 636 W/ 250 OVERRIDE (IP): Performed by: STUDENT IN AN ORGANIZED HEALTH CARE EDUCATION/TRAINING PROGRAM

## 2025-02-23 PROCEDURE — A9270 NON-COVERED ITEM OR SERVICE: HCPCS | Performed by: EMERGENCY MEDICINE

## 2025-02-23 PROCEDURE — 770001 HCHG ROOM/CARE - MED/SURG/GYN PRIV*

## 2025-02-23 PROCEDURE — 700102 HCHG RX REV CODE 250 W/ 637 OVERRIDE(OP): Performed by: EMERGENCY MEDICINE

## 2025-02-23 PROCEDURE — 700102 HCHG RX REV CODE 250 W/ 637 OVERRIDE(OP): Performed by: STUDENT IN AN ORGANIZED HEALTH CARE EDUCATION/TRAINING PROGRAM

## 2025-02-23 PROCEDURE — 82533 TOTAL CORTISOL: CPT | Mod: 91

## 2025-02-23 RX ORDER — COSYNTROPIN 0.25 MG/ML
250 INJECTION, POWDER, FOR SOLUTION INTRAMUSCULAR; INTRAVENOUS ONCE
Status: DISCONTINUED | OUTPATIENT
Start: 2025-02-24 | End: 2025-02-23

## 2025-02-23 RX ORDER — COSYNTROPIN 0.25 MG/ML
250 INJECTION, POWDER, FOR SOLUTION INTRAMUSCULAR; INTRAVENOUS ONCE
Status: COMPLETED | OUTPATIENT
Start: 2025-02-23 | End: 2025-02-23

## 2025-02-23 RX ADMIN — METHOCARBAMOL TABLETS 750 MG: 750 TABLET, COATED ORAL at 17:25

## 2025-02-23 RX ADMIN — METHOCARBAMOL TABLETS 750 MG: 750 TABLET, COATED ORAL at 20:13

## 2025-02-23 RX ADMIN — ENOXAPARIN SODIUM 40 MG: 100 INJECTION SUBCUTANEOUS at 17:24

## 2025-02-23 RX ADMIN — Medication 10 MG: at 20:13

## 2025-02-23 RX ADMIN — METHOCARBAMOL TABLETS 750 MG: 750 TABLET, COATED ORAL at 13:54

## 2025-02-23 RX ADMIN — GABAPENTIN 600 MG: 300 CAPSULE ORAL at 17:25

## 2025-02-23 RX ADMIN — GABAPENTIN 600 MG: 300 CAPSULE ORAL at 13:54

## 2025-02-23 RX ADMIN — VENLAFAXINE HYDROCHLORIDE 225 MG: 75 CAPSULE, EXTENDED RELEASE ORAL at 09:58

## 2025-02-23 RX ADMIN — PIOGLITAZONE 30 MG: 30 TABLET ORAL at 04:32

## 2025-02-23 RX ADMIN — SITAGLIPTIN 100 MG: 100 TABLET, FILM COATED ORAL at 04:32

## 2025-02-23 RX ADMIN — METHYLPHENIDATE HYDROCHLORIDE 5 MG: 5 TABLET ORAL at 13:54

## 2025-02-23 RX ADMIN — GABAPENTIN 600 MG: 300 CAPSULE ORAL at 04:32

## 2025-02-23 RX ADMIN — DOXAZOSIN 2 MG: 2 TABLET ORAL at 20:14

## 2025-02-23 RX ADMIN — DEXAMETHASONE SODIUM PHOSPHATE 4 MG: 4 INJECTION INTRA-ARTICULAR; INTRALESIONAL; INTRAMUSCULAR; INTRAVENOUS; SOFT TISSUE at 04:32

## 2025-02-23 RX ADMIN — COSYNTROPIN 250 MCG: 0.25 INJECTION, POWDER, LYOPHILIZED, FOR SOLUTION INTRAMUSCULAR; INTRAVENOUS at 08:57

## 2025-02-23 RX ADMIN — GLIPIZIDE 10 MG: 5 TABLET ORAL at 09:58

## 2025-02-23 RX ADMIN — METHYLPHENIDATE HYDROCHLORIDE 5 MG: 5 TABLET ORAL at 09:58

## 2025-02-23 RX ADMIN — OMEPRAZOLE 40 MG: 20 CAPSULE, DELAYED RELEASE ORAL at 04:32

## 2025-02-23 RX ADMIN — LIDOCAINE 2 PATCH: 4 PATCH TOPICAL at 13:53

## 2025-02-23 RX ADMIN — METHOCARBAMOL TABLETS 750 MG: 750 TABLET, COATED ORAL at 09:57

## 2025-02-23 RX ADMIN — Medication 1000 UNITS: at 04:32

## 2025-02-23 ASSESSMENT — PAIN DESCRIPTION - PAIN TYPE
TYPE: ACUTE PAIN
TYPE: ACUTE PAIN

## 2025-02-23 ASSESSMENT — ENCOUNTER SYMPTOMS
DIZZINESS: 0
WEAKNESS: 0
CONSTIPATION: 0
DIARRHEA: 1
BACK PAIN: 1
LOSS OF CONSCIOUSNESS: 0

## 2025-02-23 NOTE — CARE PLAN
The patient is Stable - Low risk of patient condition declining or worsening    Shift Goals  Clinical Goals: safety, comfort, mobility  Patient Goals: pain control, comfort  Family Goals: NA    Progress made toward(s) clinical / shift goals:  yes  Problem: Pain - Standard  Goal: Alleviation of pain or a reduction in pain to the patient’s comfort goal  Outcome: Progressing     Problem: Psychosocial  Goal: Patient's ability to identify and develop effective coping behaviors will improve  Outcome: Progressing  Goal: Patient's ability to identify and utilize available support systems will improve  Outcome: Progressing       Patient is not progressing towards the following goals:

## 2025-02-23 NOTE — PROGRESS NOTES
[facility] Medicine Daily Progress Note    Date of Service  2/23/2025    Chief Complaint  Christoph Taylor is a 59 y.o. male admitted 2/10/2025 with   Chief Complaint   Patient presents with    T-5000 FALL    Back Pain         Hospital Course  Patient is a 59-year-old male with extensive past medical history of poorly controlled type 2 diabetes mellitus, failure to thrive, and prior suicidal ideations secondary to major depressive disorder as well as his chronic back pain status post multiple laminectomies and fractures who presents due to recurrent falls as well as progressive worsening back pain. Patient was initially held in the emergency department due to suicidal ideation, did not meet any admission criteria, and had progressive worsening of his bilateral lower extremity. Patient reports he chronically has fecal incontinence for the past several months, no other new neurologic symptoms. However, patient does report new onset of acute urinary retention prompting ER provider to order stat MRI of thoracic and lumbar spine due to previous evidence of cord compression.     MRI of thoracic and lumbar showed chronic compression deformity at T12.  Postsurgical and degenerative changes.  No acute fracture.  No significant interval change.    Patient had persistent urinary retention requiring Crowley catheter placement.  Started on tamsulosin.    Patient has difficulty taking care of himself.  He has difficulty managing his diabetes.  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  Also states decreased range of motion and flexibility, dexterity of his bilateral hands which is previous cervical surgery was supposed to fix.  States he has a lot of trouble with glucometer, lancets, using insulin pens.  He was previously switched to oral hypoglycemic agents but still was not under good control and he would still like to be able to check his blood sugar because it dropped severely low previously while he  was at [place].  He apparently started having convulsions and they thought a cardiac arrest.  He also has poor vision making very difficult to do manage his meds and glucometer.  Previously saw optometry and diagnosed with diabetic retinopathy and cataracts.    Was placed on legal hold for suicidal ideations.    Interval Problem Update    HENRI ON  Had fecal incontinence overnight in his sleep.    Random cortisol was 1.  Cosyntropin test today:  Cortisol 2.1-15-20 ruling out AI.    I have discussed this patient's plan of care and discharge plan at IDT rounds today with Case Management, Nursing, Nursing leadership, and other members of the IDT team.    Consultants/Specialty  psychiatry    Code Status  Full Code    Disposition  The patient is not medically cleared for discharge to home or a post-acute facility.  Anticipate discharge to: a psychiatric hospital    I have placed the appropriate orders for post-discharge needs.    Review of Systems  Review of Systems   Gastrointestinal:  Positive for diarrhea. Negative for constipation.   Musculoskeletal:  Positive for back pain and joint pain.   Neurological:  Negative for dizziness, loss of consciousness and weakness.        Physical Exam  Temp:  [36.3 °C (97.3 °F)-36.9 °C (98.4 °F)] 36.3 °C (97.3 °F)  Pulse:  [77-82] 78  Resp:  [16-18] 18  BP: (111-131)/(64-79) 112/67  SpO2:  [93 %-95 %] 93 %    Physical Exam  Vitals and nursing note reviewed. Exam conducted with a chaperone present (Sitter at bedside).   Constitutional:       General: He is not in acute distress.     Appearance: Normal appearance. He is not ill-appearing, toxic-appearing or diaphoretic.   HENT:      Head: Normocephalic.      Ears:      Comments: +Presbycusis     Nose: Nose normal.      Mouth/Throat:      Mouth: Mucous membranes are moist.   Eyes:      General: No scleral icterus.     Conjunctiva/sclera: Conjunctivae normal.   Pulmonary:      Effort: Pulmonary effort is normal. No respiratory distress.    Genitourinary:     Comments: No jiménez  Skin:     General: Skin is warm and dry.   Neurological:      Mental Status: He is alert.      Comments: Appropriately conversant, moves all extremities   Psychiatric:         Attention and Perception: Attention and perception normal.         Mood and Affect: Mood normal. Affect is blunt.         Speech: Speech normal.         Behavior: Behavior normal. Behavior is cooperative.         Thought Content: Thought content normal.         Cognition and Memory: Cognition and memory normal.         Judgment: Judgment normal.       Fluids    Intake/Output Summary (Last 24 hours) at 2/23/2025 1439  Last data filed at 2/23/2025 1000  Gross per 24 hour   Intake 480 ml   Output 1550 ml   Net -1070 ml      Laboratory                            Imaging  [id number] WITH   Final Result      1.  Unremarkable orbits.   2.  No facial soft tissue swelling or mass.   3.  Paranasal sinuses are clear.      MR-THORACIC SPINE-W/O   Final Result      1.  Moderate chronic compression deformity at T12.   2.  No acute fracture.   3.  Multifocal degenerative disease as described above.   4.  There has been no significant interval change.      MR-LUMBAR SPINE-W/O   Final Result      1.  Postsurgical and degenerative changes as described above.   2.  Moderate chronic compression deformity at T12.   3.  No acute abnormality.   4.  There has been no significant interval change.      CT-LSPINE W/O PLUS RECONS   Final Result      1. No acute fracture or subluxation involving the lumbar spine.   2. Stable anterior wedge compression deformity of the superior endplate of T12.   3. Stable degenerative grade 1 anterolisthesis of L4 with respect to L3.   4. New postsurgical changes of posterior lateral interbody fusion at L4-5.   5. Horseshoe kidney.   6. Circumferential bladder wall thickening.      CT-TSPINE W/O PLUS RECONS   Final Result      Chronic appearing fractures without a definite acute fracture or  change in alignment.      CT-CSPINE WITHOUT PLUS RECONS   Final Result      1. No acute osseous injury of the cervical spine.   2. Stable postsurgical changes of ACDF from C4 through C7.   3. Stable multilevel cervical spondylosis.      CT-HEAD W/O   Final Result      1. Age-related central and cortical atrophy.   2. No acute intracranial abnormality.                    Assessment/Plan  * Urinary retention- (present on admission)  Assessment & Plan  Improved  Patient with acute urinary retention after fall, given extensive back history concern for potential cord abnormality.    Transitioned to doxazosin  Bladder scan, PVR with straight cath >400    Chronic midline back pain- (present on admission)  Assessment & Plan  MRI demonstrates chronic compression fracture and degenerative changes - nonoperative management  Opiates contraindicated due to urinary retention, chronic pain, and mood DO  Multimodal regimen:  Increased gabapentin to 600 mg TID  Scheduled NSAID - HELD DURING STEROIDS  SNRI - venlafaxine  Scheduled Muscle relaxant - robaxin QID  Lidoderm topical therapy  Warm compress  Psychiatry consultation for CBD  Refractory - initiating 5-day course of steroids 2/20    Orthostatic hypotension  Assessment & Plan  Recurrent  Exacerbated by high-dose tamsulosin  Transitioned to doxazosin QHS for better tolerability  Cosyntropin stimulation test negative 2/23 for AI  Orthostatic VS and IVF boluses as needed    Proliferative diabetic retinopathy of both eyes without macular edema associated with type 2 diabetes mellitus (HCC)- (present on admission)  Assessment & Plan  Complains of worsening vision  Patient Previous saw Soraida Bustamante OD on 10/5/2024.  Exam is uploaded into the media tab.  At that time his right eye visual acuity was 20/40-2, left eye 20/150-1.  Referral was placed to [facility] for diabetic retinopathy and cataracts but patient has been unable to follow-up.  Patient complained of progressively  worsening vision more acutely over the past few days.  He had a CT orbits on 2/16 which was unremarkable  Follow up with outpatient ophthalmologist    Frequent falls- (present on admission)  Assessment & Plan  Patient with frequent falls due to chronic and progressively worsening bilateral lower extremity weakness with multiple different spinal surgeries.  Patient does report chronic bowel incontinence and recently has developed urinary retention after previous fall with concern for potential cord compression/abnormality.  Lower extremity weakness 4+ out of 5, likely generalized, however asked by ER provider to admit for MRI evaluation  -MRI of thoracic and lumbar spine show chronic compression fracture at T12, degenerative changes.  No acute fracture  Neurosurgery has recommended for outpatient follow-up and possible epidural injections  - PT/OT recommend post-acute, not a candidate due to legal hold for SI    Suicidal ideation- (present on admission)  Assessment & Plan  Currently on legal hold for suicidal ideation due to his current health  Psychiatry consulted, extended legal hold  He has not contested, expect 1 week extension    Acute hypoxic respiratory failure (HCC)- (present on admission)  Assessment & Plan  Resolved    Uncontrolled type 2 diabetes mellitus with hyperglycemia (HCC)- (present on admission)  Assessment & Plan  A1c 9.8  Insulin discontinued due to difficulty managing with impaired dexterity / vision and risk of hypoglycemia  Initiate additional pharmacotherapy (SGLT2, GLP1, etc) after discharge       VTE prophylaxis: Lovenox    I have performed a physical exam and reviewed and updated ROS and Plan today (2/23/2025). In review of yesterday's note (2/22/2025), there are no changes except as documented above.

## 2025-02-23 NOTE — DISCHARGE PLANNING
Alert Team/Behavioral Health   Note:      - Rustam BOLAND: Talked to [facility]. Referral is pending review. No beds currently available.

## 2025-02-23 NOTE — PROGRESS NOTES
Patient A&OX4. Patient stated his pain is ok for now. Meds administered per MAR.  On LH, sitter at the bedside.

## 2025-02-23 NOTE — CARE PLAN
The patient is Stable - Low risk of patient condition declining or worsening    Shift Goals  Clinical Goals: safety, comfort, shower  Patient Goals: pain control, comfort  Family Goals: not present    Progress made toward(s) clinical / shift goals:    Problem: Pain - Standard  Goal: Alleviation of pain or a reduction in pain to the patient’s comfort goal  Outcome: Progressing     Problem: Knowledge Deficit - Standard  Goal: Patient and family/care givers will demonstrate understanding of plan of care, disease process/condition, diagnostic tests and medications  Outcome: Progressing     Problem: Psychosocial  Goal: Patient's ability to identify and develop effective coping behaviors will improve  Outcome: Progressing  Goal: Patient's ability to identify and utilize available support systems will improve  Outcome: Progressing     Problem: Skin Integrity  Goal: Skin integrity is maintained or improved  Outcome: Progressing     Problem: Infection - Standard  Goal: Patient will remain free from infection  Outcome: Progressing     Problem: Provide Safe Environment  Goal: Suicide environmental safety, protocols, policies, and practices will be implemented  Outcome: Met       Patient is not progressing towards the following goals:

## 2025-02-24 PROCEDURE — 700111 HCHG RX REV CODE 636 W/ 250 OVERRIDE (IP): Performed by: STUDENT IN AN ORGANIZED HEALTH CARE EDUCATION/TRAINING PROGRAM

## 2025-02-24 PROCEDURE — 700102 HCHG RX REV CODE 250 W/ 637 OVERRIDE(OP)

## 2025-02-24 PROCEDURE — 700102 HCHG RX REV CODE 250 W/ 637 OVERRIDE(OP): Performed by: EMERGENCY MEDICINE

## 2025-02-24 PROCEDURE — 700111 HCHG RX REV CODE 636 W/ 250 OVERRIDE (IP): Mod: JZ

## 2025-02-24 PROCEDURE — A9270 NON-COVERED ITEM OR SERVICE: HCPCS | Performed by: INTERNAL MEDICINE

## 2025-02-24 PROCEDURE — A9270 NON-COVERED ITEM OR SERVICE: HCPCS

## 2025-02-24 PROCEDURE — 700102 HCHG RX REV CODE 250 W/ 637 OVERRIDE(OP): Performed by: STUDENT IN AN ORGANIZED HEALTH CARE EDUCATION/TRAINING PROGRAM

## 2025-02-24 PROCEDURE — 99232 SBSQ HOSP IP/OBS MODERATE 35: CPT | Performed by: STUDENT IN AN ORGANIZED HEALTH CARE EDUCATION/TRAINING PROGRAM

## 2025-02-24 PROCEDURE — A9270 NON-COVERED ITEM OR SERVICE: HCPCS | Performed by: EMERGENCY MEDICINE

## 2025-02-24 PROCEDURE — 770001 HCHG ROOM/CARE - MED/SURG/GYN PRIV*

## 2025-02-24 PROCEDURE — 700101 HCHG RX REV CODE 250: Performed by: STUDENT IN AN ORGANIZED HEALTH CARE EDUCATION/TRAINING PROGRAM

## 2025-02-24 PROCEDURE — A9270 NON-COVERED ITEM OR SERVICE: HCPCS | Performed by: STUDENT IN AN ORGANIZED HEALTH CARE EDUCATION/TRAINING PROGRAM

## 2025-02-24 PROCEDURE — 700102 HCHG RX REV CODE 250 W/ 637 OVERRIDE(OP): Performed by: INTERNAL MEDICINE

## 2025-02-24 RX ADMIN — PIOGLITAZONE 30 MG: 30 TABLET ORAL at 06:39

## 2025-02-24 RX ADMIN — SITAGLIPTIN 100 MG: 100 TABLET, FILM COATED ORAL at 04:20

## 2025-02-24 RX ADMIN — METHOCARBAMOL TABLETS 750 MG: 750 TABLET, COATED ORAL at 12:49

## 2025-02-24 RX ADMIN — METHOCARBAMOL TABLETS 750 MG: 750 TABLET, COATED ORAL at 20:10

## 2025-02-24 RX ADMIN — METHOCARBAMOL TABLETS 750 MG: 750 TABLET, COATED ORAL at 16:19

## 2025-02-24 RX ADMIN — OMEPRAZOLE 40 MG: 20 CAPSULE, DELAYED RELEASE ORAL at 04:20

## 2025-02-24 RX ADMIN — GABAPENTIN 600 MG: 300 CAPSULE ORAL at 04:20

## 2025-02-24 RX ADMIN — GLIPIZIDE 10 MG: 5 TABLET ORAL at 09:23

## 2025-02-24 RX ADMIN — ENOXAPARIN SODIUM 40 MG: 100 INJECTION SUBCUTANEOUS at 16:19

## 2025-02-24 RX ADMIN — METHYLPHENIDATE HYDROCHLORIDE 5 MG: 5 TABLET ORAL at 12:49

## 2025-02-24 RX ADMIN — METHYLPHENIDATE HYDROCHLORIDE 5 MG: 5 TABLET ORAL at 09:23

## 2025-02-24 RX ADMIN — DEXAMETHASONE SODIUM PHOSPHATE 4 MG: 4 INJECTION INTRA-ARTICULAR; INTRALESIONAL; INTRAMUSCULAR; INTRAVENOUS; SOFT TISSUE at 04:20

## 2025-02-24 RX ADMIN — GABAPENTIN 600 MG: 300 CAPSULE ORAL at 16:19

## 2025-02-24 RX ADMIN — Medication 10 MG: at 20:10

## 2025-02-24 RX ADMIN — METHOCARBAMOL TABLETS 750 MG: 750 TABLET, COATED ORAL at 09:23

## 2025-02-24 RX ADMIN — VENLAFAXINE HYDROCHLORIDE 225 MG: 75 CAPSULE, EXTENDED RELEASE ORAL at 09:23

## 2025-02-24 RX ADMIN — LIDOCAINE 2 PATCH: 4 PATCH TOPICAL at 12:48

## 2025-02-24 RX ADMIN — Medication 1000 UNITS: at 04:20

## 2025-02-24 RX ADMIN — GABAPENTIN 600 MG: 300 CAPSULE ORAL at 12:49

## 2025-02-24 ASSESSMENT — PAIN DESCRIPTION - PAIN TYPE
TYPE: ACUTE PAIN

## 2025-02-24 ASSESSMENT — ENCOUNTER SYMPTOMS
CONSTIPATION: 0
LOSS OF CONSCIOUSNESS: 0
WEAKNESS: 0
BACK PAIN: 1
DIARRHEA: 1
DIZZINESS: 0

## 2025-02-24 NOTE — PROGRESS NOTES
Patient A&OX4. Patient stated he still has pain but it is tolerable.  Meds administered per MAR.  Patient on legal hold.  Sitter at the bedside.  Water and call light within reach.

## 2025-02-24 NOTE — CARE PLAN
Problem: Pain - Standard  Goal: Alleviation of pain or a reduction in pain to the patient’s comfort goal  Outcome: Progressing     Problem: Knowledge Deficit - Standard  Goal: Patient and family/care givers will demonstrate understanding of plan of care, disease process/condition, diagnostic tests and medications  Outcome: Progressing     Problem: Provide Safe Environment  Goal: Suicide environmental safety, protocols, policies, and practices will be implemented  Outcome: Progressing    The patient is Stable - Low risk of patient condition declining or worsening    Shift Goals  Clinical Goals: Pain control, safety  Patient Goals: Pain control  Family Goals: not present    Progress made toward(s) clinical / shift goals:  Taught pt 0-10 pain scale and non-pharmacological method of pain management, encouraged to verbalize when in pain. Administered PRN pain meds as needed. 1:1 safety sitter at bedside. Bed locked and in lowest position. Safety and fall precautions, in place. Safety checklist and disposable paper gown in place. Hourly rounding. Call light and belongings within reach. Pt able to sit on the edge of bed with stand by assist.     Patient is not progressing towards the following goals:

## 2025-02-24 NOTE — PROGRESS NOTES
[facility] Medicine Daily Progress Note    Date of Service  2/24/2025    Chief Complaint  Christoph Taylor is a 59 y.o. male admitted 2/10/2025 with   Chief Complaint   Patient presents with    T-5000 FALL    Back Pain         Hospital Course  Patient is a 59-year-old male with extensive past medical history of poorly controlled type 2 diabetes mellitus, failure to thrive, and prior suicidal ideations secondary to major depressive disorder as well as his chronic back pain status post multiple laminectomies and fractures who presents due to recurrent falls as well as progressive worsening back pain. Patient was initially held in the emergency department due to suicidal ideation, did not meet any admission criteria, and had progressive worsening of his bilateral lower extremity. Patient reports he chronically has fecal incontinence for the past several months, no other new neurologic symptoms. However, patient does report new onset of acute urinary retention prompting ER provider to order stat MRI of thoracic and lumbar spine due to previous evidence of cord compression.     MRI of thoracic and lumbar showed chronic compression deformity at T12.  Postsurgical and degenerative changes.  No acute fracture.  No significant interval change.    Patient had persistent urinary retention requiring Crowley catheter placement.  Started on tamsulosin.    Patient has difficulty taking care of himself.  He has difficulty managing his diabetes.  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  Also states decreased range of motion and flexibility, dexterity of his bilateral hands which is previous cervical surgery was supposed to fix.  States he has a lot of trouble with glucometer, lancets, using insulin pens.  He was previously switched to oral hypoglycemic agents but still was not under good control and he would still like to be able to check his blood sugar because it dropped severely low previously while he  was at [place].  He apparently started having convulsions and they thought a cardiac arrest.  He also has poor vision making very difficult to do manage his meds and glucometer.  Previously saw optometry and diagnosed with diabetic retinopathy and cataracts.    Was placed on legal hold for suicidal ideations.    Interval Problem Update    HENRI ON  He stood today and had positive orthostatics with SBP ~80.  He reports some dizziness with standing, it was improved from prior.  Doxazosin discontinued, will assess PVR tomorrow.  His shoulder and back pain are unchanged.  He indicates some acceptance of needing placement other than return home with his dog.  He is concerned about managing diabetes, reaffirmed that he has done well with insulin so far to get his A1c ~9, we can likely just do pharmacotherapy without insulin after discharge from the hospital.    POC discussed with Psychiatrist Dr. Taylor. No changes in psychiatric medications today. Improved mood today.    No new labs nor imaging today.    I have discussed this patient's plan of care and discharge plan at IDT rounds today with Case Management, Nursing, Nursing leadership, and other members of the IDT team.    Consultants/Specialty  psychiatry    Code Status  Full Code    Disposition  The patient is not medically cleared for discharge to home or a post-acute facility.  Anticipate discharge to: a psychiatric hospital    I have placed the appropriate orders for post-discharge needs.    Review of Systems  Review of Systems   Gastrointestinal:  Positive for diarrhea. Negative for constipation.   Musculoskeletal:  Positive for back pain and joint pain.   Neurological:  Negative for dizziness, loss of consciousness and weakness.        Physical Exam  Temp:  [36.2 °C (97.2 °F)-36.7 °C (98.1 °F)] 36.2 °C (97.2 °F)  Pulse:  [81-96] 91  Resp:  [17-18] 17  BP: ()/(33-88) 85/51  SpO2:  [89 %-95 %] 89 %    Physical Exam  Vitals and nursing note reviewed. Exam  conducted with a chaperone present (Sitter at bedside).   Constitutional:       General: He is not in acute distress.     Appearance: Normal appearance. He is not ill-appearing, toxic-appearing or diaphoretic.   HENT:      Head: Normocephalic.      Ears:      Comments: +Presbycusis     Nose: Nose normal.      Mouth/Throat:      Mouth: Mucous membranes are moist.   Eyes:      General: No scleral icterus.     Conjunctiva/sclera: Conjunctivae normal.   Pulmonary:      Effort: Pulmonary effort is normal. No respiratory distress.   Genitourinary:     Comments: No jiménez  Skin:     General: Skin is warm and dry.   Neurological:      Mental Status: He is alert.      Comments: Appropriately conversant, moves all extremities   Psychiatric:         Attention and Perception: Attention and perception normal.         Mood and Affect: Mood normal. Affect is blunt.         Speech: Speech normal.         Behavior: Behavior normal. Behavior is cooperative.         Thought Content: Thought content normal.         Cognition and Memory: Cognition and memory normal.         Judgment: Judgment normal.       Fluids    Intake/Output Summary (Last 24 hours) at 2/24/2025 1537  Last data filed at 2/24/2025 1100  Gross per 24 hour   Intake --   Output 2925 ml   Net -2925 ml      Laboratory                            Imaging  [id number] WITH   Final Result      1.  Unremarkable orbits.   2.  No facial soft tissue swelling or mass.   3.  Paranasal sinuses are clear.      MR-THORACIC SPINE-W/O   Final Result      1.  Moderate chronic compression deformity at T12.   2.  No acute fracture.   3.  Multifocal degenerative disease as described above.   4.  There has been no significant interval change.      MR-LUMBAR SPINE-W/O   Final Result      1.  Postsurgical and degenerative changes as described above.   2.  Moderate chronic compression deformity at T12.   3.  No acute abnormality.   4.  There has been no significant interval change.       CT-LSPINE W/O PLUS RECONS   Final Result      1. No acute fracture or subluxation involving the lumbar spine.   2. Stable anterior wedge compression deformity of the superior endplate of T12.   3. Stable degenerative grade 1 anterolisthesis of L4 with respect to L3.   4. New postsurgical changes of posterior lateral interbody fusion at L4-5.   5. Horseshoe kidney.   6. Circumferential bladder wall thickening.      CT-TSPINE W/O PLUS RECONS   Final Result      Chronic appearing fractures without a definite acute fracture or change in alignment.      CT-CSPINE WITHOUT PLUS RECONS   Final Result      1. No acute osseous injury of the cervical spine.   2. Stable postsurgical changes of ACDF from C4 through C7.   3. Stable multilevel cervical spondylosis.      CT-HEAD W/O   Final Result      1. Age-related central and cortical atrophy.   2. No acute intracranial abnormality.                    Assessment/Plan  * Urinary retention- (present on admission)  Assessment & Plan  Improved  Patient with acute urinary retention after fall, given extensive back history concern for potential cord abnormality.    Unable to tolerate A1AT due to orthostasis  If worsening retention but improved orthostasis, will discuss with Urology if inpatient TURP is feasible  Bladder scan, PVR with straight cath >400    Chronic midline back pain- (present on admission)  Assessment & Plan  MRI demonstrates chronic compression fracture and degenerative changes - nonoperative management  Opiates contraindicated due to urinary retention, chronic pain, and mood DO  Multimodal regimen:  Increased gabapentin to 600 mg TID  Scheduled NSAID - HELD DURING STEROIDS  SNRI - venlafaxine  Scheduled Muscle relaxant - robaxin QID  Lidoderm topical therapy  Warm compress  Psychiatry consultation for CBD  Refractory -  5-day course of steroids 2/20-2/24    Orthostatic hypotension  Assessment & Plan  Recurrent  Exacerbated by high-dose tamsulosin, not improved with  doxazosin  Holding A1AT and will assess UOP  Cosyntropin stimulation test negative 2/23 for AI  Orthostatic VS and IVF boluses as needed    Proliferative diabetic retinopathy of both eyes without macular edema associated with type 2 diabetes mellitus (HCC)- (present on admission)  Assessment & Plan  Complains of worsening vision  Patient Previous saw Soraida Bustamante, OD on 10/5/2024.  Exam is uploaded into the media tab.  At that time his right eye visual acuity was 20/40-2, left eye 20/150-1.  Referral was placed to [facility] for diabetic retinopathy and cataracts but patient has been unable to follow-up.  Patient complained of progressively worsening vision more acutely over the past few days.  He had a CT orbits on 2/16 which was unremarkable  Follow up with outpatient ophthalmologist    Frequent falls- (present on admission)  Assessment & Plan  Patient with frequent falls due to chronic and progressively worsening bilateral lower extremity weakness with multiple different spinal surgeries.  Patient does report chronic bowel incontinence and recently has developed urinary retention after previous fall with concern for potential cord compression/abnormality.  Lower extremity weakness 4+ out of 5, likely generalized, however asked by ER provider to admit for MRI evaluation  -MRI of thoracic and lumbar spine show chronic compression fracture at T12, degenerative changes.  No acute fracture  Neurosurgery has recommended for outpatient follow-up and possible epidural injections  - PT/OT recommend post-acute, not a candidate due to legal hold for SI    Suicidal ideation- (present on admission)  Assessment & Plan  Currently on legal hold for suicidal ideation due to his current health  Psychiatry consulted, extended legal hold  He has not contested, expect 1 week extension    Acute hypoxic respiratory failure (HCC)- (present on admission)  Assessment & Plan  Resolved    Uncontrolled type 2 diabetes mellitus with  hyperglycemia (HCC)- (present on admission)  Assessment & Plan  A1c 9.8  Insulin discontinued due to difficulty managing with impaired dexterity / vision and risk of hypoglycemia  Initiate additional pharmacotherapy (SGLT2, GLP1, etc) after discharge       VTE prophylaxis: Lovenox    I have performed a physical exam and reviewed and updated ROS and Plan today (2/24/2025). In review of yesterday's note (2/23/2025), there are no changes except as documented above.

## 2025-02-24 NOTE — CARE PLAN
The patient is Stable - Low risk of patient condition declining or worsening    Shift Goals  Clinical Goals: pain control, comfort, safety  Patient Goals: pain control, comfort  Family Goals: not present    Progress made toward(s) clinical / shift goals:  yes  Problem: Pain - Standard  Goal: Alleviation of pain or a reduction in pain to the patient’s comfort goal  2/24/2025 0112 by Aníbal Bustamante R.N.  Outcome: Progressing  2/24/2025 0112 by Aníbal Bustamante R.N.  Outcome: Progressing  2/24/2025 0112 by Aníbal Bustamante R.N.  Outcome: Progressing     Problem: Psychosocial  Goal: Patient's ability to identify and develop effective coping behaviors will improve  Outcome: Progressing  Goal: Patient's ability to identify and utilize available support systems will improve  Outcome: Progressing     Problem: Mobility  Goal: Risk for activity intolerance will decrease  Outcome: Progressing     Problem: Safety  Goal: Will remain free from injury  Outcome: Progressing  Goal: Will remain free from falls  Outcome: Progressing       Patient is not progressing towards the following goals:

## 2025-02-25 ENCOUNTER — APPOINTMENT (OUTPATIENT)
Dept: RADIOLOGY | Facility: MEDICAL CENTER | Age: 60
DRG: 696 | End: 2025-02-25
Attending: STUDENT IN AN ORGANIZED HEALTH CARE EDUCATION/TRAINING PROGRAM
Payer: COMMERCIAL

## 2025-02-25 PROCEDURE — 73030 X-RAY EXAM OF SHOULDER: CPT | Mod: RT

## 2025-02-25 PROCEDURE — 700111 HCHG RX REV CODE 636 W/ 250 OVERRIDE (IP): Mod: JZ

## 2025-02-25 PROCEDURE — A9270 NON-COVERED ITEM OR SERVICE: HCPCS | Performed by: EMERGENCY MEDICINE

## 2025-02-25 PROCEDURE — A9270 NON-COVERED ITEM OR SERVICE: HCPCS | Performed by: STUDENT IN AN ORGANIZED HEALTH CARE EDUCATION/TRAINING PROGRAM

## 2025-02-25 PROCEDURE — 700102 HCHG RX REV CODE 250 W/ 637 OVERRIDE(OP): Performed by: EMERGENCY MEDICINE

## 2025-02-25 PROCEDURE — A9270 NON-COVERED ITEM OR SERVICE: HCPCS

## 2025-02-25 PROCEDURE — 700102 HCHG RX REV CODE 250 W/ 637 OVERRIDE(OP)

## 2025-02-25 PROCEDURE — 700101 HCHG RX REV CODE 250: Performed by: STUDENT IN AN ORGANIZED HEALTH CARE EDUCATION/TRAINING PROGRAM

## 2025-02-25 PROCEDURE — 51798 US URINE CAPACITY MEASURE: CPT

## 2025-02-25 PROCEDURE — 700102 HCHG RX REV CODE 250 W/ 637 OVERRIDE(OP): Performed by: STUDENT IN AN ORGANIZED HEALTH CARE EDUCATION/TRAINING PROGRAM

## 2025-02-25 PROCEDURE — 99232 SBSQ HOSP IP/OBS MODERATE 35: CPT | Performed by: STUDENT IN AN ORGANIZED HEALTH CARE EDUCATION/TRAINING PROGRAM

## 2025-02-25 PROCEDURE — 770001 HCHG ROOM/CARE - MED/SURG/GYN PRIV*

## 2025-02-25 PROCEDURE — A9270 NON-COVERED ITEM OR SERVICE: HCPCS | Performed by: INTERNAL MEDICINE

## 2025-02-25 PROCEDURE — 700102 HCHG RX REV CODE 250 W/ 637 OVERRIDE(OP): Performed by: INTERNAL MEDICINE

## 2025-02-25 PROCEDURE — 90837 PSYTX W PT 60 MINUTES: CPT | Performed by: SOCIAL WORKER

## 2025-02-25 PROCEDURE — 97530 THERAPEUTIC ACTIVITIES: CPT | Mod: CQ

## 2025-02-25 RX ADMIN — METHYLPHENIDATE HYDROCHLORIDE 5 MG: 5 TABLET ORAL at 12:28

## 2025-02-25 RX ADMIN — OMEPRAZOLE 40 MG: 20 CAPSULE, DELAYED RELEASE ORAL at 05:58

## 2025-02-25 RX ADMIN — LIDOCAINE 2 PATCH: 4 PATCH TOPICAL at 12:28

## 2025-02-25 RX ADMIN — METHOCARBAMOL TABLETS 750 MG: 750 TABLET, COATED ORAL at 08:02

## 2025-02-25 RX ADMIN — GABAPENTIN 600 MG: 300 CAPSULE ORAL at 05:58

## 2025-02-25 RX ADMIN — GABAPENTIN 600 MG: 300 CAPSULE ORAL at 12:28

## 2025-02-25 RX ADMIN — METHOCARBAMOL TABLETS 750 MG: 750 TABLET, COATED ORAL at 12:28

## 2025-02-25 RX ADMIN — GABAPENTIN 600 MG: 300 CAPSULE ORAL at 16:33

## 2025-02-25 RX ADMIN — PIOGLITAZONE 30 MG: 30 TABLET ORAL at 05:58

## 2025-02-25 RX ADMIN — GLIPIZIDE 10 MG: 5 TABLET ORAL at 08:02

## 2025-02-25 RX ADMIN — ENOXAPARIN SODIUM 40 MG: 100 INJECTION SUBCUTANEOUS at 16:33

## 2025-02-25 RX ADMIN — Medication 10 MG: at 21:46

## 2025-02-25 RX ADMIN — SITAGLIPTIN 100 MG: 100 TABLET, FILM COATED ORAL at 05:58

## 2025-02-25 RX ADMIN — Medication 1000 UNITS: at 05:58

## 2025-02-25 RX ADMIN — METHOCARBAMOL TABLETS 750 MG: 750 TABLET, COATED ORAL at 16:33

## 2025-02-25 RX ADMIN — VENLAFAXINE HYDROCHLORIDE 225 MG: 75 CAPSULE, EXTENDED RELEASE ORAL at 08:02

## 2025-02-25 RX ADMIN — METHYLPHENIDATE HYDROCHLORIDE 5 MG: 5 TABLET ORAL at 08:02

## 2025-02-25 RX ADMIN — METHOCARBAMOL TABLETS 750 MG: 750 TABLET, COATED ORAL at 21:46

## 2025-02-25 ASSESSMENT — ENCOUNTER SYMPTOMS
BACK PAIN: 1
LOSS OF CONSCIOUSNESS: 0
WEAKNESS: 0
CONSTIPATION: 0
DIZZINESS: 0
DIARRHEA: 1

## 2025-02-25 ASSESSMENT — COGNITIVE AND FUNCTIONAL STATUS - GENERAL
MOVING FROM LYING ON BACK TO SITTING ON SIDE OF FLAT BED: A LITTLE
TURNING FROM BACK TO SIDE WHILE IN FLAT BAD: A LITTLE
STANDING UP FROM CHAIR USING ARMS: A LITTLE
SUGGESTED CMS G CODE MODIFIER MOBILITY: CK
MOBILITY SCORE: 17
WALKING IN HOSPITAL ROOM: A LITTLE
CLIMB 3 TO 5 STEPS WITH RAILING: A LOT
MOVING TO AND FROM BED TO CHAIR: A LITTLE

## 2025-02-25 ASSESSMENT — PAIN DESCRIPTION - PAIN TYPE
TYPE: ACUTE PAIN

## 2025-02-25 NOTE — CARE PLAN
Problem: Pain - Standard  Goal: Alleviation of pain or a reduction in pain to the patient’s comfort goal  Outcome: Progressing     Problem: Provide Safe Environment  Goal: Suicide environmental safety, protocols, policies, and practices will be implemented  Outcome: Progressing       The patient is Stable - Low risk of patient condition declining or worsening    Shift Goals  Clinical Goals: Pain control, safety  Patient Goals: Pain control  Family Goals: no family present    Progress made toward(s) clinical / shift goals:    Taught pt 0-10 pain scale and non-pharmacological method of pain management, encouraged to verbalize when in pain. Administered PRN pain meds as needed. 1:1 safety sitter at bedside. Bed locked and in lowest position. Safety and fall precautions, in place. Safety checklist and disposable paper gown in place. Hourly rounding. Call light and belongings within reach. Pt able to sit on the edge of bed with stand by assist.     Patient is not progressing towards the following goals:
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
## 2025-02-25 NOTE — CARE PLAN
The patient is Watcher - Medium risk of patient condition declining or worsening    Shift Goals  Clinical Goals: safety, pain control  Patient Goals: rest, comfort  Family Goals: no family present    Progress made toward(s) clinical / shift goals:    Problem: Pain - Standard  Goal: Alleviation of pain or a reduction in pain to the patient’s comfort goal  Outcome: Progressing    Administer pain medications per MAR.     Problem: Infection - Standard  Goal: Patient will remain free from infection  Outcome: Progressing    Educated the patient regarding the importance of hand hygiene.        Patient is not progressing towards the following goals:

## 2025-02-25 NOTE — PROGRESS NOTES
[facility] Medicine Daily Progress Note    Date of Service  2/25/2025    Chief Complaint  Christoph Taylor is a 59 y.o. male admitted 2/10/2025 with   Chief Complaint   Patient presents with    T-5000 FALL    Back Pain         Hospital Course  Patient is a 59-year-old male with extensive past medical history of poorly controlled type 2 diabetes mellitus, failure to thrive, and prior suicidal ideations secondary to major depressive disorder as well as his chronic back pain status post multiple laminectomies and fractures who presents due to recurrent falls as well as progressive worsening back pain. Patient was initially held in the emergency department due to suicidal ideation, did not meet any admission criteria, and had progressive worsening of his bilateral lower extremity. Patient reports he chronically has fecal incontinence for the past several months, no other new neurologic symptoms. However, patient does report new onset of acute urinary retention prompting ER provider to order stat MRI of thoracic and lumbar spine due to previous evidence of cord compression.     MRI of thoracic and lumbar showed chronic compression deformity at T12.  Postsurgical and degenerative changes.  No acute fracture.  No significant interval change.    Patient had persistent urinary retention requiring Crowley catheter placement.  Started on tamsulosin.    Patient has difficulty taking care of himself.  He has difficulty managing his diabetes.  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  Also states decreased range of motion and flexibility, dexterity of his bilateral hands which is previous cervical surgery was supposed to fix.  States he has a lot of trouble with glucometer, lancets, using insulin pens.  He was previously switched to oral hypoglycemic agents but still was not under good control and he would still like to be able to check his blood sugar because it dropped severely low previously while he  was at [place].  He apparently started having convulsions and they thought a cardiac arrest.  He also has poor vision making very difficult to do manage his meds and glucometer.  Previously saw optometry and diagnosed with diabetic retinopathy and cataracts.    Was placed on legal hold for suicidal ideations.    Interval Problem Update  No acute events overnight.  Patient reports feeling okay. He is having some dizziness when getting up but this is improving. He is seen in chair today.  Discussed with nursing, monitor orthostatic vital signs, SBP 90's today with orthostatic BP.   Patient with chronic diarrhea, continue imodium prn. Discussed outpatient GI follow up for evaluation.  Encouraged PO intake to stay hydrated.  Monitor for retention given hx BPH and stopped doxazosin yesterday for orthostatic hypotension.  On legal hold. Denies active suicidal or homicidal ideation.  Continue mediations per psychiatry.  Plan for discharge to inpatient psychiatric facility when available.  CM concerned about physical mobility as this may be barrier to acceptance to inpatient psych facility. Will have nursing continue to work on mobility tolerance.      I have discussed this patient's plan of care and discharge plan at IDT rounds today with Case Management, Nursing, Nursing leadership, and other members of the IDT team.    Consultants/Specialty  psychiatry    Code Status  Full Code    Disposition  Medically Cleared  I have placed the appropriate orders for post-discharge needs.    Review of Systems  Review of Systems   Gastrointestinal:  Positive for diarrhea. Negative for constipation.   Musculoskeletal:  Positive for back pain and joint pain.   Neurological:  Negative for dizziness, loss of consciousness and weakness.        Physical Exam  Temp:  [36.2 °C (97.2 °F)-36.7 °C (98.1 °F)] 36.2 °C (97.2 °F)  Pulse:  [71-92] 85  Resp:  [16-17] 16  BP: (105-145)/(67-78) 130/78  SpO2:  [93 %-97 %] 94 %    Physical Exam  Vitals and  nursing note reviewed. Exam conducted with a chaperone present (Sitter at bedside).   Constitutional:       General: He is not in acute distress.     Appearance: Normal appearance. He is not ill-appearing, toxic-appearing or diaphoretic.   HENT:      Head: Normocephalic.      Ears:      Comments: +Presbycusis     Nose: Nose normal.      Mouth/Throat:      Mouth: Mucous membranes are moist.   Eyes:      General: No scleral icterus.     Conjunctiva/sclera: Conjunctivae normal.   Pulmonary:      Effort: Pulmonary effort is normal. No respiratory distress.   Genitourinary:     Comments: No jiménez  Skin:     General: Skin is warm and dry.   Neurological:      Mental Status: He is alert.      Comments: Appropriately conversant, moves all extremities   Psychiatric:         Attention and Perception: Attention and perception normal.         Mood and Affect: Mood normal. Affect is blunt.         Speech: Speech normal.         Behavior: Behavior normal. Behavior is cooperative.         Thought Content: Thought content normal.         Cognition and Memory: Cognition and memory normal.         Judgment: Judgment normal.       Fluids    Intake/Output Summary (Last 24 hours) at 2/25/2025 1414  Last data filed at 2/25/2025 1020  Gross per 24 hour   Intake --   Output 2775 ml   Net -2775 ml      Laboratory                            Imaging  DX-SHOULDER 2+ RIGHT   Final Result      1.  No evidence of right shoulder fracture or dislocation.      2.  Moderate osteoarthritic changes of the right AC joint      [id number] WITH   Final Result      1.  Unremarkable orbits.   2.  No facial soft tissue swelling or mass.   3.  Paranasal sinuses are clear.      MR-THORACIC SPINE-W/O   Final Result      1.  Moderate chronic compression deformity at T12.   2.  No acute fracture.   3.  Multifocal degenerative disease as described above.   4.  There has been no significant interval change.      MR-LUMBAR SPINE-W/O   Final Result      1.   Postsurgical and degenerative changes as described above.   2.  Moderate chronic compression deformity at T12.   3.  No acute abnormality.   4.  There has been no significant interval change.      CT-LSPINE W/O PLUS RECONS   Final Result      1. No acute fracture or subluxation involving the lumbar spine.   2. Stable anterior wedge compression deformity of the superior endplate of T12.   3. Stable degenerative grade 1 anterolisthesis of L4 with respect to L3.   4. New postsurgical changes of posterior lateral interbody fusion at L4-5.   5. Horseshoe kidney.   6. Circumferential bladder wall thickening.      CT-TSPINE W/O PLUS RECONS   Final Result      Chronic appearing fractures without a definite acute fracture or change in alignment.      CT-CSPINE WITHOUT PLUS RECONS   Final Result      1. No acute osseous injury of the cervical spine.   2. Stable postsurgical changes of ACDF from C4 through C7.   3. Stable multilevel cervical spondylosis.      CT-HEAD W/O   Final Result      1. Age-related central and cortical atrophy.   2. No acute intracranial abnormality.                    Assessment/Plan  * Urinary retention- (present on admission)  Assessment & Plan  Improved  Patient with acute urinary retention after fall, given extensive back history concern for potential cord abnormality.    Unable to tolerate A1AT due to orthostasis  If worsening retention but improved orthostasis, will discuss with Urology if inpatient TURP is feasible  Bladder scan prn, straight cath for PVR>400cc    Chronic midline back pain- (present on admission)  Assessment & Plan  MRI demonstrates chronic compression fracture and degenerative changes - nonoperative management  Opiates contraindicated due to urinary retention, chronic pain, and mood DO  Multimodal regimen:  Increased gabapentin to 600 mg TID  Scheduled NSAID - HELD DURING STEROIDS  SNRI - venlafaxine  Scheduled Muscle relaxant - robaxin QID  Lidoderm topical therapy  Warm  compress  Psychiatry consultation for CBD  Refractory -  5-day course of steroids 2/20-2/24    Orthostatic hypotension  Assessment & Plan  Recurrent  Exacerbated by high-dose tamsulosin, not improved with doxazosin  Holding A1AT and will assess UOP  Cosyntropin stimulation test negative 2/23 for AI  Orthostatic VS and IVF boluses as needed    Proliferative diabetic retinopathy of both eyes without macular edema associated with type 2 diabetes mellitus (HCC)- (present on admission)  Assessment & Plan  Complains of worsening vision  Patient Previous saw Soraida Bustamante, OD on 10/5/2024.  Exam is uploaded into the media tab.  At that time his right eye visual acuity was 20/40-2, left eye 20/150-1.  Referral was placed to [facility] for diabetic retinopathy and cataracts but patient has been unable to follow-up.  Patient complained of progressively worsening vision more acutely over the past few days.  He had a CT orbits on 2/16 which was unremarkable  Follow up with outpatient ophthalmologist    Frequent falls- (present on admission)  Assessment & Plan  Patient with frequent falls due to chronic and progressively worsening bilateral lower extremity weakness with multiple different spinal surgeries.  Patient does report chronic bowel incontinence and recently has developed urinary retention after previous fall with concern for potential cord compression/abnormality.  Lower extremity weakness 4+ out of 5, likely generalized, however asked by ER provider to admit for MRI evaluation  -MRI of thoracic and lumbar spine show chronic compression fracture at T12, degenerative changes.  No acute fracture  Neurosurgery has recommended for outpatient follow-up and possible epidural injections  - PT/OT recommend post-acute, not a candidate due to legal hold for SI    Suicidal ideation- (present on admission)  Assessment & Plan  Currently on legal hold for suicidal ideation due to his current health  Psychiatry consulted,  extended legal hold  He has not contested, expect 1 week extension    Acute hypoxic respiratory failure (HCC)- (present on admission)  Assessment & Plan  Resolved    Uncontrolled type 2 diabetes mellitus with hyperglycemia (HCC)- (present on admission)  Assessment & Plan  A1c 9.8  Insulin discontinued due to difficulty managing with impaired dexterity / vision and risk of hypoglycemia  Initiate additional pharmacotherapy (SGLT2, GLP1, etc) after discharge       VTE prophylaxis: Lovenox

## 2025-02-25 NOTE — PROGRESS NOTES
"PSYCHIATRIC FOLLOW-UP: (established)  *Reason for admission:     Urinary retention   *Legal Hold Status on Admission:  Ongoing  Chart reviewed.          *HPI:          Christoph Taylor is a 59 male with history of type 2 diabetes mellitus, failure to thrive, and prior suicidal ideations secondary to major depressive disorder, and progressive worsening back pain. He was initially held in the emergency department due to suicidal ideation, did not meet any admission criteria, and had progressive worsening of his bilateral lower extremity. Patient currently reports R shoulder pain and mid-back pain, due to his compression fracture. Reports mood is ok today but he still feels hopeless about \"if I return home, there is no way I can take care of myself so what's the point? No one cares to help me.\" NO SI in the hospital but cannot contract for safety \"if I go back to how I was living.\" Slept only 3/4 hours last night, waking up due to pain. Does sleep in the day and IS tired and fatigues. Has been eating fine. Denies any SI but says that \"nobody cares whether I live or die.\" Mentions having \"weird dreams\" about his past and things that are \"hard to explain.\" Does not open eyes much throughout the interaction. Mentions that he is unable to cook for himself and can't monitor his insulin due to \"not being able to grab anything.\" He also says he has trouble walking which makes it tough for him to care for his dog, which is \"my only reason to live.\" Does express interest in an assisted living, saying \"assisted living would help me because I can't go back to the way I was living before.\" Expressed interest in assisted living facilities in the [place], but understands that he may have to start at a location in [place].           Reviewed nursing noted and spoke with nursing staff. The patient is with medication. The patient has not been aggressive or agitated. The patient is eating (25-50)% of breakfast, (50-75) % of lunch, ()% " "of dinner as of flow sheet yesterday. The patient has not required psychiatric PRN medications in the last 24 hours. Per nursing, patient not expressing SI but does say \"nobody cares if I live or die.\" No other behavioral issues noted.     Medical ROS (as pertinent):     Review of Systems   Constitutional: Negative.    HENT: Negative.     Eyes: Negative.    Respiratory: Negative.     Cardiovascular: Negative.    Gastrointestinal: Diarrhea    Genitourinary: Negative.    Musculoskeletal:  Positive for back pain and joint pain.   Skin: Negative.    Neurological:  Positive for sensory change.   Endo/Heme/Allergies: Negative.       *Psychiatric Examination:  Vitals:    02/24/25 1613   BP: (!) 145/77   Pulse: 92   Resp: 17   Temp: 36.2 °C (97.2 °F)   SpO2: 97%         General:   - Grooming and hygiene: Appropriate hygiene and grooming  - Apparent distress: NAD   - Behavior: Calm and appropriate  - Eye Contact: Within normal limits  - no psychomotor agitation or retardation  - Participation: Active verbal participation  Orientation: Grossly oriented to person, place and time  Mood: \"fine\"  Affect: Congruent, dysthymic  Thought Process: Linear logical and goal directed  Thought Content: No SI but mentions \"nobody cares if I live or die\"  Perception: Denies auditory or visual hallucinations. Mentions having \"weird dreams.\" No delusions noted   Attention span and concentration: Intact   Speech: Regular rate, volume and prosody  Language: Appropriate   Insight: Limited  Judgment: Fair  Recent and remote memory: Grossly intact        Current Medications:    Current Facility-Administered Medications:     melatonin tablet 10 mg, 10 mg, Oral, Nightly, Wyatt Houston M.D., 10 mg at 02/23/25 2013    venlafaxine XR (Effexor XR) capsule 225 mg, 225 mg, Oral, QDAY with Breakfast, Natacha Castillo M.D., 225 mg at 02/24/25 0923    methylphenidate (Ritalin) tablet 5 mg, 5 mg, Oral, DAILY WITH LUNCH, Natacha Castillo M.D., 5 mg at " 02/24/25 1249    methylphenidate (Ritalin) tablet 5 mg, 5 mg, Oral, QDAY with Breakfast, Natacha Castillo M.D., 5 mg at 02/24/25 0923    gabapentin (Neurontin) capsule 600 mg, 600 mg, Oral, TID, Wyatt Houston M.D., 600 mg at 02/24/25 1619    methocarbamol (Robaxin) tablet 750 mg, 750 mg, Oral, 4X/DAY, Wyatt Houston M.D., 750 mg at 02/24/25 1619    [Held by provider] meloxicam (Mobic) tablet 15 mg, 15 mg, Oral, DAILY, Wyatt Houston M.D., 15 mg at 02/20/25 0414    lidocaine (Asperflex) 4 % patch 2 Patch, 2 Patch, Transdermal, Q24HR, Wyatt Houston M.D., 2 Patch at 02/24/25 1248    vitamin D3 (Cholecalciferol) tablet 1,000 Units, 1,000 Units, Oral, DAILY, Ayo Fernandez M.D., 1,000 Units at 02/24/25 0420    glipiZIDE (Glucotrol) tablet 10 mg, 10 mg, Oral, QA AC, Rico Peters, D.O., 10 mg at 02/24/25 0923    pioglitazone (Actos) tablet 30 mg, 30 mg, Oral, DAILY, Rico Peters D.O., 30 mg at 02/24/25 0639    SITagliptin (Januvia) tablet 100 mg, 100 mg, Oral, DAILY, Rico Peters D.O., 100 mg at 02/24/25 0420    loperamide (Imodium) capsule 2 mg, 2 mg, Oral, 4X/DAY PRN, Rico Peters D.O., 2 mg at 02/20/25 1804    omeprazole (PriLOSEC) capsule 40 mg, 40 mg, Oral, DAILY, Alyson Foote D.O., 40 mg at 02/24/25 0420    enoxaparin (Lovenox) inj 40 mg, 40 mg, Subcutaneous, DAILY AT 1800, Anatoliy Eagle D.O., 40 mg at 02/24/25 1619    senna-docusate (Pericolace Or Senokot S) 8.6-50 MG per tablet 2 Tablet, 2 Tablet, Oral, Q EVENING, 2 Tablet at 02/22/25 1825 **AND** polyethylene glycol/lytes (Miralax) Packet 1 Packet, 1 Packet, Oral, QDAY PRN, Anatoliy Eagle D.O.    Pharmacy Consult Request ...Pain Management Review 1 Each, 1 Each, Other, PHARMACY TO DOSE, Anatoliy Eagle D.O.    ondansetron (Zofran) syringe/vial injection 4 mg, 4 mg, Intravenous, Q4HRS PRN, Rico Peters D.O., 4 mg at 02/11/25 0948    [DISCONTINUED] insulin lispro (HumaLOG,AdmeLOG) subcutaneous injection, 3-14 Units,  Subcutaneous, 4X/DAY ACHS, 4 Units at 25 **AND** [CANCELED] POC blood glucose manual result, , , Q AC AND BEDTIME(S) **AND** NOTIFY MD and PharmD, , , Once **AND** Administer 20 grams of glucose (approximately 8 ounces of fruit juice) every 15 minutes PRN FSBG less than 70 mg/dL, , , PRN **AND** dextrose 50% (D50W) injection 25 g, 25 g, Intravenous, Q15 MIN PRN, Rico Peters D.O.      Allergies:  Ketamine      Labs personally reviewed:   Lab Results   Component Value Date/Time    SODIUM 137 2025 10:47 AM    POTASSIUM 4.8 2025 10:47 AM    CHLORIDE 102 2025 10:47 AM    CO2 28 2025 10:47 AM    GLUCOSE 211 (H) 2025 10:47 AM    BUN 23 (H) 2025 10:47 AM    CREATININE 0.67 2025 10:47 AM    BUNCREATRAT 16.7 2024 07:28 AM        Lab Results   Component Value Date/Time    WBC 4.0 (L) 2025 10:47 AM    RBC 4.07 (L) 2025 10:47 AM    HEMOGLOBIN 10.1 (L) 2025 10:47 AM    HEMATOCRIT 32.5 (L) 2025 10:47 AM    MCV 79.9 (L) 2025 10:47 AM    MCH 24.8 (L) 2025 10:47 AM    MCHC 31.1 (L) 2025 10:47 AM    MPV 9.1 2025 10:47 AM    NEUTSPOLYS 55.20 2025 03:47 AM    LYMPHOCYTES 31.90 2025 03:47 AM    MONOCYTES 8.30 2025 03:47 AM    EOSINOPHILS 4.00 2025 03:47 AM    BASOPHILS 0.30 2025 03:47 AM           Recent Labs     25  0347   ASTSGOT 13   ALTSGPT 14   TBILIRUBIN 0.2   GLOBULIN 2.6           Latest Reference Range & Units 24 16:54   TSH 0.380 - 5.330 uIU/mL 1.280        Latest Reference Range & Units 01/10/25 00:08   Vitamin B12 -True Cobalamin 211 - 911 pg/mL 995 (H)   (H): Data is abnormally high    Latest Reference Range & Units 01/10/25 00:08   25-Hydroxy   Vitamin D 25 30 - 100 ng/mL 11 (L)   (L): Data is abnormally low    Cortisol: 20.2 (25)       *EK/10/25    Measurements   Intervals                               Axis   Rate:       70                           P:           61   NC:         169                          QRS:        60   QRSD:       91                           T:          70   QT:         420   QTc:        454     Interpretive Statements   Sinus rhythm   Borderline low voltage, extremity leads       Assessment:  Christoph Taylor is a 59 y.o. male with a history of T2DM, failure to thrive and prior SI secondary to MDD and chronic back pain.     Endorsing some future orientation in that he can discuss assisted living, appears to be aware that this is in hi best interest even if he cannot have his dog. No Active Si today but still cannot fully contract for safety outside of the hospital. Did encourage patient to sit up in a chair for all meals and try to walk around unit with sitter. He agrees. No side effects so far from recent increase in Effexor    Dx:/  Major depressive disorder, severe     Medical:  Principal Problem:    Urinary retention (POA: Yes)  Active Problems:    Uncontrolled type 2 diabetes mellitus with hyperglycemia (HCC) (POA: Yes)    Acute hypoxic respiratory failure (HCC) (POA: Yes)    Suicidal ideation (POA: Yes)    Frequent falls (POA: Unknown)    Proliferative diabetic retinopathy of both eyes without macular edema associated with type 2 diabetes mellitus (HCC) (POA: Unknown)  Resolved Problems:    * No resolved hospital problems. *     Plan:  Legal hold: On legal hold, extended  Psychotropic medications  Continue methylphenidate (ritalin) 5 mg BID for depression  continue Effexor  mg daily for major depressive disorder  Please transfer pt to inpatient psychiatric hospital when medically cleared and bed is available  We will need to confirm firearms removed from home prior to discontinuation of legal hold  Psychiatry will follow up  Discussed with: Dr. Houston  Please allow patient to walk around unit with sitter and sit up in chair for meals     Thank you for the consult.      Legal Hold-extended  Level of observation-one to one  Personal  [id number] glasses and wallet  Visitors-no

## 2025-02-25 NOTE — DISCHARGE PLANNING
Late entry from 2/24/25  Medical Social Work  PC from patient's sister, calling to provide name of realtor/phone Stephanie at [employer] [phone].  SW was told that she had attempted to provide this information to patient, but he became upset and cussing her out.  Patient is focused on his dog, wanting to take it to where he is going next.  SW asked if the dog is still in the trailer,  SW told yes.  A neighbor is going over two times a day feeding and letting it out.  Patient has had the dog for some time, previous management at the [facility] ignored policy of not having dog.  Current management is not ignoring policy, has been told by neighbor they are trying to serve patient.  Believes they are trying to evict him.

## 2025-02-25 NOTE — THERAPY
Physical Therapy   Daily Treatment     Patient Name: Christoph Taylor  Age:  59 y.o., Sex:  male  Medical Record #: [id number]  Today's Date: 2/25/2025     Precautions  Precautions: (P) Fall Risk  Comments: (P) Lft LE AFO    Assessment    The pt willing to participate w/PT. Functionally the pt is Sup->CGA for bed mobility and STS/transfers w/FWW. The pt's BP response is limiting the pt's ability to work on hallway ambulation, nrsg notified of pts BP.   BP: Supine 146/95 HR 92         /75 HR 88         Following step transfer 93/65 HR 89  The pt is symptomatic w/dizziness.   The pt has the strength for ambulation, his BP remains the limiting factor.  PT will cont to follow.     Plan    Treatment Plan Status: (P) Continue Current Treatment Plan  Type of Treatment: Gait Training, Neuro Re-Education / Balance, Self Care / Home Evaluation, Stair Training, Therapeutic Activities, Therapeutic Exercise  Treatment Frequency: 3 Times per Week  Treatment Duration: Until Therapy Goals Met    DC Equipment Recommendations: (P) Unable to determine at this time  Discharge Recommendations: (P) Recommend post-acute placement for additional physical therapy services prior to discharge home    Objective       02/25/25 1138   Time In/Time Out   Therapy Start Time 1059   Therapy End Time 1138   Total Therapy Time 39   Charge Group   PT Therapeutic Activities (Units) 3   Total Time Spent   PT Therapeutic Activities Time Spent (Mins) 39   PT Total Time Spent (Calculated) 39   Precautions   Precautions Fall Risk   Comments Lft LE AFO   Pain 0 - 10 Group   Pain Rating Scale (NPRS) 3   Therapist Pain Assessment During Activity;0   Other Treatments   Other Treatments Provided BP: Supine 146/95 HR 92, /75 HR 88, Step transfer to the chair 93/65 HR 89. The pt symptomatic w/his BP. Emotional support provided, the pt missing his dog.    Balance   Sitting Balance (Static) Good   Sitting Balance (Dynamic) Fair +   Standing Balance  (Static) Fair -   Standing Balance (Dynamic) Poor +   Weight Shift Sitting Good   Weight Shift Standing Fair   Skilled Intervention Verbal Cuing   Comments standing w/FWW   Bed Mobility    Supine to Sit Supervised  (HOB flat and use of railing)   Sit to Supine   (the pt left seated in the recliner chair)   Scooting Supervised   Rolling Supervised   Gait Analysis   Comments The pt only able to tolerate steps for his transfer. The pt's BP response remains limiting factor on initiation of ambulation.   Functional Mobility   Sit to Stand Contact Guard Assist  (from EOB->FWW)   Bed, Chair, Wheelchair Transfer Contact Guard Assist  (bed->chair w/FWW)   Transfer Method Stand Step   Skilled Intervention Verbal Cuing   6 Clicks Assessment - How much HELP from from another person do you currently need... (If the patient hasn't done an activity recently, how much help from another person do you think he/she would need if he/she tried?)   Turning from your back to your side while in a flat bed without using bedrails? 3   Moving from lying on your back to sitting on the side of a flat bed without using bedrails? 3   Moving to and from a bed to a chair (including a wheelchair)? 3   Standing up from a chair using your arms (e.g., wheelchair, or bedside chair)? 3   Walking in hospital room? 3   Climbing 3-5 steps with a railing? 2   6 clicks Mobility Score 17   Short Term Goals    Short Term Goal # 1 Pt will be able to perform STS with FWW and SPV in 6 visits to progress functional transfers   Goal Outcome # 1 goal not met   Short Term Goal # 2 Pt will be able to perform stand step transfer with FWW and SPV in 6 visits to progress functional mobility   Goal Outcome # 2 Goal not met   Short Term Goal # 3 Pt will be able to ambulate 15ft with FWW and SPV in 6 visits to progress functional ambulation   Goal Outcome # 3 Goal not met   Short Term Goal # 4 Pt will be able to self propel 150ft in WC in 6 visits to progress functional  mobility   Goal Outcome # 4 Goal not met   Short Term Goal # 5 Pt will be able to ascend/descend 5 stairs with SPV in 6 visits in order to safely navigate his home   Goal Outcome # 5 Goal not met   Education Group   Role of Physical Therapist Patient Response Patient;Acceptance;Explanation;Action Demonstration   Physical Therapy Treatment Plan   Physical Therapy Treatment Plan Continue Current Treatment Plan   Anticipated Discharge Equipment and Recommendations   DC Equipment Recommendations Unable to determine at this time   Discharge Recommendations Recommend post-acute placement for additional physical therapy services prior to discharge home   Interdisciplinary Plan of Care Collaboration   IDT Collaboration with  Nursing   Patient Position at End of Therapy Seated;Call Light within Reach;Other (Comments)  (virgilio AVILEZ)   Collaboration Comments Nrsg notified of pts tx efforts   Session Information   Date / Session Number  2/25--3 (1/3, 2/26) PTA/2   Supervising Physical Therapist (PTA Treatments Only)   Supervising Physical Therapist Vanessa Scott

## 2025-02-26 PROCEDURE — 700111 HCHG RX REV CODE 636 W/ 250 OVERRIDE (IP): Mod: JZ

## 2025-02-26 PROCEDURE — A9270 NON-COVERED ITEM OR SERVICE: HCPCS | Performed by: INTERNAL MEDICINE

## 2025-02-26 PROCEDURE — 99231 SBSQ HOSP IP/OBS SF/LOW 25: CPT | Performed by: STUDENT IN AN ORGANIZED HEALTH CARE EDUCATION/TRAINING PROGRAM

## 2025-02-26 PROCEDURE — 700102 HCHG RX REV CODE 250 W/ 637 OVERRIDE(OP)

## 2025-02-26 PROCEDURE — 700102 HCHG RX REV CODE 250 W/ 637 OVERRIDE(OP): Performed by: STUDENT IN AN ORGANIZED HEALTH CARE EDUCATION/TRAINING PROGRAM

## 2025-02-26 PROCEDURE — 770001 HCHG ROOM/CARE - MED/SURG/GYN PRIV*

## 2025-02-26 PROCEDURE — 51798 US URINE CAPACITY MEASURE: CPT

## 2025-02-26 PROCEDURE — 700101 HCHG RX REV CODE 250: Performed by: STUDENT IN AN ORGANIZED HEALTH CARE EDUCATION/TRAINING PROGRAM

## 2025-02-26 PROCEDURE — A9270 NON-COVERED ITEM OR SERVICE: HCPCS | Performed by: EMERGENCY MEDICINE

## 2025-02-26 PROCEDURE — 700102 HCHG RX REV CODE 250 W/ 637 OVERRIDE(OP): Performed by: EMERGENCY MEDICINE

## 2025-02-26 PROCEDURE — 700102 HCHG RX REV CODE 250 W/ 637 OVERRIDE(OP): Performed by: INTERNAL MEDICINE

## 2025-02-26 PROCEDURE — 99232 SBSQ HOSP IP/OBS MODERATE 35: CPT | Performed by: STUDENT IN AN ORGANIZED HEALTH CARE EDUCATION/TRAINING PROGRAM

## 2025-02-26 PROCEDURE — A9270 NON-COVERED ITEM OR SERVICE: HCPCS

## 2025-02-26 PROCEDURE — A9270 NON-COVERED ITEM OR SERVICE: HCPCS | Performed by: STUDENT IN AN ORGANIZED HEALTH CARE EDUCATION/TRAINING PROGRAM

## 2025-02-26 RX ADMIN — METHYLPHENIDATE HYDROCHLORIDE 5 MG: 5 TABLET ORAL at 09:06

## 2025-02-26 RX ADMIN — Medication 1000 UNITS: at 05:43

## 2025-02-26 RX ADMIN — GABAPENTIN 600 MG: 300 CAPSULE ORAL at 05:43

## 2025-02-26 RX ADMIN — METHOCARBAMOL TABLETS 750 MG: 750 TABLET, COATED ORAL at 16:45

## 2025-02-26 RX ADMIN — OMEPRAZOLE 40 MG: 20 CAPSULE, DELAYED RELEASE ORAL at 05:43

## 2025-02-26 RX ADMIN — PIOGLITAZONE 30 MG: 30 TABLET ORAL at 05:43

## 2025-02-26 RX ADMIN — Medication 10 MG: at 21:46

## 2025-02-26 RX ADMIN — VENLAFAXINE HYDROCHLORIDE 225 MG: 75 CAPSULE, EXTENDED RELEASE ORAL at 09:07

## 2025-02-26 RX ADMIN — GABAPENTIN 600 MG: 300 CAPSULE ORAL at 16:45

## 2025-02-26 RX ADMIN — METHOCARBAMOL TABLETS 750 MG: 750 TABLET, COATED ORAL at 09:07

## 2025-02-26 RX ADMIN — METHYLPHENIDATE HYDROCHLORIDE 5 MG: 5 TABLET ORAL at 12:51

## 2025-02-26 RX ADMIN — SITAGLIPTIN 100 MG: 100 TABLET, FILM COATED ORAL at 05:43

## 2025-02-26 RX ADMIN — LIDOCAINE 2 PATCH: 4 PATCH TOPICAL at 10:55

## 2025-02-26 RX ADMIN — ENOXAPARIN SODIUM 40 MG: 100 INJECTION SUBCUTANEOUS at 16:45

## 2025-02-26 RX ADMIN — GABAPENTIN 600 MG: 300 CAPSULE ORAL at 10:54

## 2025-02-26 RX ADMIN — GLIPIZIDE 10 MG: 5 TABLET ORAL at 09:07

## 2025-02-26 RX ADMIN — METHOCARBAMOL TABLETS 750 MG: 750 TABLET, COATED ORAL at 12:51

## 2025-02-26 RX ADMIN — METHOCARBAMOL TABLETS 750 MG: 750 TABLET, COATED ORAL at 21:46

## 2025-02-26 ASSESSMENT — ENCOUNTER SYMPTOMS
WEAKNESS: 0
BACK PAIN: 1
DIARRHEA: 1
CONSTIPATION: 0
LOSS OF CONSCIOUSNESS: 0
DIZZINESS: 0

## 2025-02-26 ASSESSMENT — PAIN DESCRIPTION - PAIN TYPE: TYPE: ACUTE PAIN

## 2025-02-26 NOTE — PROGRESS NOTES
"PSYCHIATRIC FOLLOW-UP: (established)  *Reason for admission:     Urinary retention   *Legal Hold Status on Admission:  Ongoing  Chart reviewed.          *HPI:          Christoph Taylor is a 59 male with history of type 2 diabetes mellitus, failure to thrive, and prior suicidal ideations secondary to major depressive disorder, and progressive worsening back pain. He was initially held in the emergency department due to suicidal ideation, did not meet any admission criteria, and had progressive worsening of his bilateral lower extremity. Patient currently reports R shoulder pain and mid-back pain, due to a compression fracture. He also complains of having trouble hearing and seeing since yesterday. Reports mood is \"not good\" today and feels that \"theres nothing for me to live for.\" He mentions that he is \"anxious to leave because I don't know where to go.\" He says that \"No one cares to help me\" and \"even my sons stole from me while I was in here.\" Expresses SI as he says \"I can't do this anymore.\" Cannot contract for safety \"if I go back to how I was living. I can't even take food out the microwave.\" Mentions that he is unable to cook for himself and can't monitor his insulin due to \"not being able to grab anything. When asked about what he is looking forward to in the future, he responds \"what future.\" Slept only 3/4 hours last night, waking up due to pain. Does sleep in the day and IS tired and fatigued. Has been eating fine. A lot more interactive during rounds. Mentions feeling overwhelmed with everything in his personal life and feels that is plating a significant role in his depression. He says his dog is his only reason to live but \"they're going to take him away so now I have nothing.\" \"I wouldn't have made it this far without him,\" Christoph says, when talking about his dog. Christoph begins sharing a little about his past, stating he has been  2 times and worked as computer , quitting in 2020 " "due to problems in his feet. He says that that was around the time he began to become estranged form his children. \"That and the back pain are what caused my depression. Says he has never been treated for depression in the past up until about a year ago. Christoph mentions that he has had multiple depressive episodes but never one of this intensity. He mentions that he has been suicidal for the past 4 months. He is currently laying in his bed with a paper to call [facility]. He says that he is \"trying to activate my KATHE card so I can call [facility] and get myself a phone.\" Comparatively to before, he is more active and willing to take initiative.      Reviewed nursing noted and spoke with nursing staff. The patient is with medication. The patient has not been aggressive or agitated. The patient is eating (50-75)% of breakfast, () % of lunch, ()% of dinner as of flow sheet yesterday. The patient has not required psychiatric PRN medications in the last 24 hours. Per nursing, patient is expressing SI and says \"I have nothing to look forward to.\" No other behavioral issues noted.     Medical ROS (as pertinent):     Review of Systems   Constitutional: Negative.    HENT: Having trouble hearing; says \"might just be wax\"     Eyes: Blurry vision    Respiratory: Negative.     Cardiovascular: Negative.    Gastrointestinal: Diarrhea    Genitourinary: Negative.    Musculoskeletal:  Positive for back pain and joint pain.   Skin: Negative.    Neurological:  Positive for sensory change.   Endo/Heme/Allergies: Negative.       *Psychiatric Examination:  Vitals:    02/26/25 0540   BP: 130/83   Pulse: 85   Resp: 17   Temp: 36.7 °C (98.1 °F)   SpO2: 96%         General:   - Grooming and hygiene: Appropriate hygiene and grooming  - Apparent distress: NAD   - Behavior: Calm and appropriate  - Eye Contact: Within normal limits  - no psychomotor agitation or retardation  - Participation: Active verbal participation  Orientation: " "Grossly oriented to person, place and time  Mood: \"not good\"  Affect: Congruent, dysthymic  Thought Process: Linear logical and goal directed  Thought Content: Expresses SI \"I can't do this anymore\" \"  Perception: Denies auditory or visual hallucinations. No delusions noted   Attention span and concentration: Intact   Speech: Regular rate, volume and prosody  Language: Appropriate   Insight: Limited  Judgment: Limited  Recent and remote memory: Grossly intact        Current Medications:      Current Facility-Administered Medications:     diclofenac sodium (Voltaren) 1 % gel 2 g, 2 g, Topical, 4X/DAY PRN, Myke Bustamante M.D.    melatonin tablet 10 mg, 10 mg, Oral, Nightly, Wyatt Houston M.D., 10 mg at 02/25/25 2146    venlafaxine XR (Effexor XR) capsule 225 mg, 225 mg, Oral, QDAY with Breakfast, Natacha Castillo M.D., 225 mg at 02/26/25 0907    methylphenidate (Ritalin) tablet 5 mg, 5 mg, Oral, DAILY WITH LUNCH, Natacha Castillo M.D., 5 mg at 02/26/25 1251    methylphenidate (Ritalin) tablet 5 mg, 5 mg, Oral, QDAY with Breakfast, Natacha Castillo M.D., 5 mg at 02/26/25 0906    gabapentin (Neurontin) capsule 600 mg, 600 mg, Oral, TID, Wyatt Houston M.D., 600 mg at 02/26/25 1054    methocarbamol (Robaxin) tablet 750 mg, 750 mg, Oral, 4X/DAY, Wyatt Houston M.D., 750 mg at 02/26/25 1251    [Held by provider] meloxicam (Mobic) tablet 15 mg, 15 mg, Oral, DAILY, Wyatt Houston M.D., 15 mg at 02/20/25 0414    lidocaine (Asperflex) 4 % patch 2 Patch, 2 Patch, Transdermal, Q24HR, Wyatt Houston M.D., 2 Patch at 02/26/25 1055    vitamin D3 (Cholecalciferol) tablet 1,000 Units, 1,000 Units, Oral, DAILY, Ayo Fernandez M.D., 1,000 Units at 02/26/25 0543    glipiZIDE (Glucotrol) tablet 10 mg, 10 mg, Oral, [facility], JOSY KimOOlga, 10 mg at 02/26/25 0907    pioglitazone (Actos) tablet 30 mg, 30 mg, Oral, DAILY, JOSY KimOOlga, 30 mg at 02/26/25 0543    SITagliptin (Januvia) tablet 100 mg, 100 mg, Oral, " DAILY, Rico Peters D.O., 100 mg at 02/26/25 0543    loperamide (Imodium) capsule 2 mg, 2 mg, Oral, 4X/DAY PRN, Rico Peters D.O., 2 mg at 02/20/25 1804    omeprazole (PriLOSEC) capsule 40 mg, 40 mg, Oral, DAILY, Alyson Foote D.O., 40 mg at 02/26/25 0543    enoxaparin (Lovenox) inj 40 mg, 40 mg, Subcutaneous, DAILY AT 1800, Anatoliy Eagle D.O., 40 mg at 02/25/25 1633    senna-docusate (Pericolace Or Senokot S) 8.6-50 MG per tablet 2 Tablet, 2 Tablet, Oral, Q EVENING, 2 Tablet at 02/22/25 1825 **AND** polyethylene glycol/lytes (Miralax) Packet 1 Packet, 1 Packet, Oral, QDAY PRN, Anatoliy Eagle D.O.    Pharmacy Consult Request ...Pain Management Review 1 Each, 1 Each, Other, PHARMACY TO DOSE, Anatoliy Eagle D.O.    ondansetron (Zofran) syringe/vial injection 4 mg, 4 mg, Intravenous, Q4HRS PRN, Rico Peters D.O., 4 mg at 02/11/25 0948    [DISCONTINUED] insulin lispro (HumaLOG,AdmeLOG) subcutaneous injection, 3-14 Units, Subcutaneous, 4X/DAY ACHS, 4 Units at 02/17/25 2212 **AND** [CANCELED] POC blood glucose manual result, , , Q AC AND BEDTIME(S) **AND** NOTIFY MD and PharmD, , , Once **AND** Administer 20 grams of glucose (approximately 8 ounces of fruit juice) every 15 minutes PRN FSBG less than 70 mg/dL, , , PRN **AND** dextrose 50% (D50W) injection 25 g, 25 g, Intravenous, Q15 MIN PRN, Rico Peters D.O.       Allergies:  Ketamine      Labs personally reviewed:   Lab Results   Component Value Date/Time    SODIUM 137 02/14/2025 10:47 AM    POTASSIUM 4.8 02/14/2025 10:47 AM    CHLORIDE 102 02/14/2025 10:47 AM    CO2 28 02/14/2025 10:47 AM    GLUCOSE 211 (H) 02/14/2025 10:47 AM    BUN 23 (H) 02/14/2025 10:47 AM    CREATININE 0.67 02/14/2025 10:47 AM    BUNCREATRAT 16.7 01/25/2024 07:28 AM         Lab Results   Component Value Date/Time    WBC 4.0 (L) 02/14/2025 10:47 AM    RBC 4.07 (L) 02/14/2025 10:47 AM    HEMOGLOBIN 10.1 (L) 02/14/2025 10:47 AM    HEMATOCRIT 32.5 (L) 02/14/2025 10:47 AM     MCV 79.9 (L) 2025 10:47 AM    MCH 24.8 (L) 2025 10:47 AM    MCHC 31.1 (L) 2025 10:47 AM    MPV 9.1 2025 10:47 AM    NEUTSPOLYS 55.20 2025 03:47 AM    LYMPHOCYTES 31.90 2025 03:47 AM    MONOCYTES 8.30 2025 03:47 AM    EOSINOPHILS 4.00 2025 03:47 AM    BASOPHILS 0.30 2025 03:47 AM             Latest Reference Range & Units 24 16:54   TSH 0.380 - 5.330 uIU/mL 1.280        Latest Reference Range & Units 01/10/25 00:08   Vitamin B12 -True Cobalamin 211 - 911 pg/mL 995 (H)   (H): Data is abnormally high    Latest Reference Range & Units 01/10/25 00:08   25-Hydroxy   Vitamin D 25 30 - 100 ng/mL 11 (L)   (L): Data is abnormally low     Cortisol: 20.2 (25)        *EK/10/25    Measurements   Intervals                               Axis   Rate:       70                           P:          61   HI:         169                          QRS:        60   QRSD:       91                           T:          70   QT:         420   QTc:        454     Interpretive Statements   Sinus rhythm   Borderline low voltage, extremity leads       DX-Shoulder 2+ Right (25)    FINDINGS:  There is normal bony mineralization of the shoulder. There is no evidence of fracture, dislocation, or osseous lesion. The acromioclavicular joint is intact. There is mild joint space narrowing and marginal osteophytosis at the AC joint     IMPRESSION:     1.  No evidence of right shoulder fracture or dislocation.     2.  Moderate osteoarthritic changes of the right AC joint      Assessment:  Christoph Taylor is a 59 y.o. male with a history of T2DM, failure to thrive and prior SI secondary to MDD and chronic back pain. He reports hopelessness. He mentions that being estranged from his kids and fear of losing his dog exacerbate his depression. However he is much more awake and interactive today. He also does show some initiative to improve his situation. Depression appears very  "situationally motivated. he also expresses SI as he says \"I have nothing to live for\" and cannot contract for safety because he appears very overwhelmed by situation regarding housing and lack of ability to care for self due to weakness. Patient is encouraged to try and accomplish one task a day and not overwhelm himself with multiple things at once.      After our interview Social work has offered assistance with placement at a skilled facility which patient appears open to. This does demonstrate future oriented thinking, could be possible to begin safety planing with patient with plan to transition to skilled nursing. Will follow on this    Dx:  Major depressive disorder, severe     Medical:  Principal Problem:    Urinary retention (POA: Yes)  Active Problems:    Uncontrolled type 2 diabetes mellitus with hyperglycemia (HCC) (POA: Yes)    Acute hypoxic respiratory failure (HCC) (POA: Yes)    Suicidal ideation (POA: Yes)    Frequent falls (POA: Unknown)    Proliferative diabetic retinopathy of both eyes without macular edema associated with type 2 diabetes mellitus (HCC) (POA: Unknown)  Resolved Problems:    * No resolved hospital problems. *     Plan:  Legal hold: On legal hold, extended  Psychotropic medications, defer changes, patient is showing more motivation recently, social stressors appear to be affecting mood at this time  Continue methylphenidate (ritalin) 5 mg BID for depression  continue Effexor  mg daily for major depressive disorder  Please transfer pt to inpatient psychiatric hospital when medically cleared and bed is available  We will need to confirm firearms removed from home prior to discontinuation of legal hold  Psychiatry will follow up    Please allow patient to walk around unit with sitter and sit up in chair for meals     Thank you for the consult.      Legal Hold-extended  Level of observation-one to one  Personal [id number] glasses and wallet  Visitors-no  "

## 2025-02-26 NOTE — CARE PLAN
The patient is Stable - Low risk of patient condition declining or worsening    Shift Goals  Clinical Goals: safety  Patient Goals: Pain control  Family Goals: no family present    Progress made toward(s) clinical / shift goals:  pt is Aox4; on room air; afebrile; pt is calm and cooperative;  pt states pain to his right shoulder/back, no opioids per MD; has lidocaine patch on board, pt's states no relief; has Robaxin scheduled; pt claims thoughts of hurting himself; human sitter at bedside; continue legal hold.   Patient is not progressing towards the following goals:

## 2025-02-26 NOTE — DISCHARGE PLANNING
Received Stipulation and Order from the court continuing pt's legal hold until 3/6, scanned copy into pt's chart.

## 2025-02-26 NOTE — DISCHARGE PLANNING
Medical Social Work  SW provided patient name number of ankit who has a  for his trailer.  Patient told SW that he will be selling as he can not continue to live on his own.    SW asked patient if he would be willing to go to a skilled facility for additional rehab to get stronger. Patient stated that he thinks this would be a good idea.  SW told patient that his insurance is only accepted at three facilities in [facility] and one in [place].   SW asked patient if they decline referrals would be sent to [facility].  As there are more facilities there that accept his insurance.  Patient stated he would consider going to [facility] if that is the only option he has.

## 2025-02-26 NOTE — CARE PLAN
The patient is Stable - Low risk of patient condition declining or worsening    Shift Goals  Clinical Goals: safety  Patient Goals: pain control  Family Goals: no family present    Progress made toward(s) clinical / shift goals:    Problem: Pain - Standard  Goal: Alleviation of pain or a reduction in pain to the patient’s comfort goal  Outcome: Progressing   Educated on pain scale. Encouraged to verbalize pain. Will medicate as per MAR    Problem: Knowledge Deficit - Standard  Goal: Patient and family/care givers will demonstrate understanding of plan of care, disease process/condition, diagnostic tests and medications  Outcome: Progressing   POC discussed with the patient. Safety sitter at the bedside. On Legal hold.     Problem: Provide Safety Environment  Goal: Suicide environmental safety, protocols, policies, and practices will be implemented  Outcome: Progressing       Patient is not progressing towards the following goals:

## 2025-02-27 PROCEDURE — 700111 HCHG RX REV CODE 636 W/ 250 OVERRIDE (IP): Mod: JZ

## 2025-02-27 PROCEDURE — 700102 HCHG RX REV CODE 250 W/ 637 OVERRIDE(OP)

## 2025-02-27 PROCEDURE — 700102 HCHG RX REV CODE 250 W/ 637 OVERRIDE(OP): Performed by: STUDENT IN AN ORGANIZED HEALTH CARE EDUCATION/TRAINING PROGRAM

## 2025-02-27 PROCEDURE — A9270 NON-COVERED ITEM OR SERVICE: HCPCS

## 2025-02-27 PROCEDURE — 99231 SBSQ HOSP IP/OBS SF/LOW 25: CPT | Performed by: STUDENT IN AN ORGANIZED HEALTH CARE EDUCATION/TRAINING PROGRAM

## 2025-02-27 PROCEDURE — 700101 HCHG RX REV CODE 250: Performed by: STUDENT IN AN ORGANIZED HEALTH CARE EDUCATION/TRAINING PROGRAM

## 2025-02-27 PROCEDURE — 770001 HCHG ROOM/CARE - MED/SURG/GYN PRIV*

## 2025-02-27 PROCEDURE — 700102 HCHG RX REV CODE 250 W/ 637 OVERRIDE(OP): Performed by: EMERGENCY MEDICINE

## 2025-02-27 PROCEDURE — 700102 HCHG RX REV CODE 250 W/ 637 OVERRIDE(OP): Performed by: INTERNAL MEDICINE

## 2025-02-27 PROCEDURE — 90837 PSYTX W PT 60 MINUTES: CPT | Performed by: SOCIAL WORKER

## 2025-02-27 PROCEDURE — A9270 NON-COVERED ITEM OR SERVICE: HCPCS | Performed by: EMERGENCY MEDICINE

## 2025-02-27 PROCEDURE — 51798 US URINE CAPACITY MEASURE: CPT

## 2025-02-27 PROCEDURE — A9270 NON-COVERED ITEM OR SERVICE: HCPCS | Performed by: STUDENT IN AN ORGANIZED HEALTH CARE EDUCATION/TRAINING PROGRAM

## 2025-02-27 PROCEDURE — A9270 NON-COVERED ITEM OR SERVICE: HCPCS | Performed by: INTERNAL MEDICINE

## 2025-02-27 RX ADMIN — VENLAFAXINE HYDROCHLORIDE 225 MG: 75 CAPSULE, EXTENDED RELEASE ORAL at 07:42

## 2025-02-27 RX ADMIN — METHOCARBAMOL TABLETS 750 MG: 750 TABLET, COATED ORAL at 16:13

## 2025-02-27 RX ADMIN — Medication 1000 UNITS: at 06:30

## 2025-02-27 RX ADMIN — OMEPRAZOLE 40 MG: 20 CAPSULE, DELAYED RELEASE ORAL at 06:30

## 2025-02-27 RX ADMIN — METHYLPHENIDATE HYDROCHLORIDE 5 MG: 5 TABLET ORAL at 13:25

## 2025-02-27 RX ADMIN — GABAPENTIN 600 MG: 300 CAPSULE ORAL at 06:30

## 2025-02-27 RX ADMIN — METHOCARBAMOL TABLETS 750 MG: 750 TABLET, COATED ORAL at 13:25

## 2025-02-27 RX ADMIN — METHYLPHENIDATE HYDROCHLORIDE 5 MG: 5 TABLET ORAL at 07:42

## 2025-02-27 RX ADMIN — ENOXAPARIN SODIUM 40 MG: 100 INJECTION SUBCUTANEOUS at 16:13

## 2025-02-27 RX ADMIN — PIOGLITAZONE 30 MG: 30 TABLET ORAL at 06:31

## 2025-02-27 RX ADMIN — METHOCARBAMOL TABLETS 750 MG: 750 TABLET, COATED ORAL at 22:21

## 2025-02-27 RX ADMIN — Medication 10 MG: at 22:21

## 2025-02-27 RX ADMIN — GABAPENTIN 600 MG: 300 CAPSULE ORAL at 16:13

## 2025-02-27 RX ADMIN — SITAGLIPTIN 100 MG: 100 TABLET, FILM COATED ORAL at 06:30

## 2025-02-27 RX ADMIN — METHOCARBAMOL TABLETS 750 MG: 750 TABLET, COATED ORAL at 07:42

## 2025-02-27 RX ADMIN — GLIPIZIDE 10 MG: 5 TABLET ORAL at 06:30

## 2025-02-27 RX ADMIN — LIDOCAINE 2 PATCH: 4 PATCH TOPICAL at 13:26

## 2025-02-27 RX ADMIN — GABAPENTIN 600 MG: 300 CAPSULE ORAL at 13:25

## 2025-02-27 ASSESSMENT — ANXIETY QUESTIONNAIRES
GAD7 TOTAL SCORE: 16
3. WORRYING TOO MUCH ABOUT DIFFERENT THINGS: NEARLY EVERY DAY
2. NOT BEING ABLE TO STOP OR CONTROL WORRYING: NEARLY EVERY DAY
5. BEING SO RESTLESS THAT IT IS HARD TO SIT STILL: NOT AT ALL
1. FEELING NERVOUS, ANXIOUS, OR ON EDGE: MORE THAN HALF THE DAYS
6. BECOMING EASILY ANNOYED OR IRRITABLE: NEARLY EVERY DAY
4. TROUBLE RELAXING: NEARLY EVERY DAY
7. FEELING AFRAID AS IF SOMETHING AWFUL MIGHT HAPPEN: MORE THAN HALF THE DAYS

## 2025-02-27 ASSESSMENT — ENCOUNTER SYMPTOMS
LOSS OF CONSCIOUSNESS: 0
DIARRHEA: 1
WEAKNESS: 0
CONSTIPATION: 0
DIZZINESS: 0
BACK PAIN: 1

## 2025-02-27 ASSESSMENT — PAIN DESCRIPTION - PAIN TYPE: TYPE: ACUTE PAIN

## 2025-02-27 NOTE — CARE PLAN
The patient is Stable - Low risk of patient condition declining or worsening    Shift Goals  Clinical Goals: safety  Patient Goals: rest  Family Goals: not present    Progress made toward(s) clinical / shift goals:    Problem: Knowledge Deficit - Standard  Goal: Patient and family/care givers will demonstrate understanding of plan of care, disease process/condition, diagnostic tests and medications  Outcome: Progressing     Problem: Provide Safe Environment  Goal: Suicide environmental safety, protocols, policies, and practices will be implemented  Outcome: Progressing     Problem: Psychosocial  Goal: Patient's ability to identify and develop effective coping behaviors will improve  Outcome: Progressing     Problem: Safety  Goal: Will remain free from injury  Outcome: Progressing

## 2025-02-27 NOTE — CONSULTS
"[facility]   Psychotherapy Session Follow up    Name: Christoph Taylor  MRN: [id number]  : 1965  Age: 59 y.o.  Date of assessment: 25  PCP: DIANA Tariq  Persons in attendance: Patient    HPI: Per Medical Record  \"Patient is a 59-year-old male with extensive past medical history of poorly controlled type 2 diabetes mellitus, failure to thrive, and prior suicidal ideations secondary to major depressive disorder as well as his chronic back pain status post multiple laminectomies and fractures who presents due to recurrent falls as well as progressive worsening back pain. Patient was initially held in the emergency department due to suicidal ideation, did not meet any admission criteria, and had progressive worsening of his bilateral lower extremity. Patient reports he chronically has fecal incontinence for the past several months, no other new neurologic symptoms. However, patient does report new onset of acute urinary retention prompting ER provider to order stat MRI of thoracic and lumbar spine due to previous evidence of cord compression.    MRI of thoracic and lumbar showed chronic compression deformity at T12.  Postsurgical and degenerative changes.  No acute fracture.  No significant interval change.   Patient had persistent urinary retention requiring Crowley catheter placement.  Started on tamsulosin.   Patient has difficulty taking care of himself.  He has difficulty managing his diabetes.  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  Also states decreased range of motion and flexibility, dexterity of his bilateral hands which is previous cervical surgery was supposed to fix.  States he has a lot of trouble with glucometer, lancets, using insulin pens.  He was previously switched to oral hypoglycemic agents but still was not under good control and he would still like to be able to check his blood sugar because it dropped severely low previously " "while he was at [facility].  He apparently started having convulsions and they thought a cardiac arrest.  He also has poor vision making very difficult to do manage his meds and glucometer.  Previously saw optometry and diagnosed with diabetic retinopathy and cataracts.   Was placed on legal hold for suicidal ideations.\" [facility] due to Legal Hold and suicidal ideations.    Psychotherapy Session Summary  Christoph Taylor is a 59 year old male seen lying in his hospital bed sleeping, but easy to arouse. Patient was alert, oriented, speech clear and coherent and engaged in the interview process. Patient denies auditory or visual hallucinations, no homicidal ideations.     Patient expressed concern about limited vision and hearing. Patient reports fear of losing these abilities and is requesting assistance. Patient continues to report suicidal ideations and a sense of hopelessness due to his current medical condition and his perception that he is deteriorating medically.     Despite this, patient has begun to engage in tasks agreed upon with clinician to include getting his debit card activated, purchasing a new cell phone, engaging with the realtor regarding the sale of his property to express is wishes and desires related to the finalization of the sale. Patient has begun to develop a future focus and may be ready to consider moving towards safety planning again.     This clinician has worked with patient in the past to create a safety plan, only to see him regress to suicidal thoughts again when faced with a new challenge or barrier. Clinician is working with patient to build a sense of confidence regarding his ability to problem solve and self advocate. Additionally, it is important for patient to develop the capacity to tolerate distress when it occurs instead of resorting to thoughts and plans for suicide. This is a continual work in progress as patient has " not yet released suicide as an option, given his current medical condition and relational isolation.    Clinician challenged patient  to consider the negative impact related to his way of engaging with others. Patient was able to acknowledge his negative perspective on life in general, can be a catalyst for friends and loved ones decision to disengage and distance themselves from him. Clinician challenged patient to work on positive self talk and affirmations, with the goal of developing some level of hope even in the midst of a very difficult situation.     Additionally, when talking to family, clinician encouraged patient to refrain from negative complaints, yelling and the use of profanity. Patient often says he does not understand why his family does not want to talk to him. Clinician used reality testing methods with patient to encourage him to evaluate his behavior and determine if he has some responsibility in discouraging his family to remain in contact with him. Patient was able to receive this reality test and consider using a different way to engage with others in the future.    Patient becomes tearful when discussing his dog. Patient easily reverts back to negative self talk and hopelessness. Clinician continually redirected patient to re-frame from such language and search for any sense of hope possible. This is a problem for patient as he is accustomed to giving up and accepting defeat. Will continue to provide support regarding this issue of the dog and other issues. The concern of the dog is a particularly sensitive topic for patient, although he understands the limitations related to his ability to have the dog with him in any placement situation.    Clinician will continue to provide support and encouragement as we work towards the safety planning process.      Chief Complaint   Patient presents with    T-5000 FALL    Back Pain          SAFETY ASSESSMENT - SELF  Does patient acknowledge current or  past symptoms of dangerousness to self? yes   Does parent/significant other report patient has current or past symptoms of dangerousness to self? N\A     Does presenting problem suggest symptoms of dangerousness to self?  Yes      SAFETY ASSESSMENT - OTHERS  Does patient acknowledge current or past symptoms of aggressive behavior or risk to others? no   Does parent/significant other report patient has current or past symptoms of aggressive behavior or risk to others? N\A     Does presenting problem suggest symptoms of dangerousness to others?  No      Crisis Safety Plan completed and copy given to patient? no    SUBSTANCE USE SCREENING  Not assessed at this time    MENTAL STATUS  Participation Active verbal participation   Grooming Disheveled   Orientation Alert   Behavior Tense   Eye contact Limited   Mood Depressed and Anxious   Affect Sad and Anxious   Thought Process Circumstantial   Thought Content Within normal limits   Speech Soft   Perception Within normal limits   Memory No gross evidence of memory deficits   Insight Limited   Judgement Limited   Other      Collateral Information:   Source: [facility], [facility] Record, and  Other: Psychiatrist    Unable to complete full assessment due to:  NA - Assessment completed    CLINICAL IMPRESSIONS:  Primary:  Major depressive disorder, severe  Secondary: R/O Cluster B personality traits                                       Recommendations and Observation Level:  Sitter: one to one  Phone: yes and hospital phone  Visitors: yes with supervision  Personal belongings: glasses, wallet, Ipad  Patient may have bedside table tray          Legal Hold: josé miguel Tripathi, Ph.D., [facility]  2/27/2025    Length of Intervention:  60 minutes

## 2025-02-27 NOTE — PROGRESS NOTES
[facility] Medicine Daily Progress Note    Date of Service  2/27/2025    Chief Complaint  Christoph Taylor is a 59 y.o. male admitted 2/10/2025 with   Chief Complaint   Patient presents with    T-5000 FALL    Back Pain         Hospital Course  Patient is a 59-year-old male with extensive past medical history of poorly controlled type 2 diabetes mellitus, failure to thrive, and prior suicidal ideations secondary to major depressive disorder as well as his chronic back pain status post multiple laminectomies and fractures who presents due to recurrent falls as well as progressive worsening back pain. Patient was initially held in the emergency department due to suicidal ideation, did not meet any admission criteria, and had progressive worsening of his bilateral lower extremity. Patient reports he chronically has fecal incontinence for the past several months, no other new neurologic symptoms. However, patient does report new onset of acute urinary retention prompting ER provider to order stat MRI of thoracic and lumbar spine due to previous evidence of cord compression.     MRI of thoracic and lumbar showed chronic compression deformity at T12.  Postsurgical and degenerative changes.  No acute fracture.  No significant interval change.    Patient had persistent urinary retention requiring Crowley catheter placement.  Started on tamsulosin.    Patient has difficulty taking care of himself.  He has difficulty managing his diabetes.  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  Also states decreased range of motion and flexibility, dexterity of his bilateral hands which is previous cervical surgery was supposed to fix.  States he has a lot of trouble with glucometer, lancets, using insulin pens.  He was previously switched to oral hypoglycemic agents but still was not under good control and he would still like to be able to check his blood sugar because it dropped severely low previously while he  was at [place].  He apparently started having convulsions and they thought a cardiac arrest.  He also has poor vision making very difficult to do manage his meds and glucometer.  Previously saw optometry and diagnosed with diabetic retinopathy and cataracts.    Was placed on legal hold for suicidal ideations.    Interval Problem Update  No acute events overnight.  Patient states vision has been getting worse over time, this is not new. CT orbits done 2/16 without acute findings.  Urinating okay, continue bladder scans prn. Has not needed straight cath here recently.  On legal hold. Psychiatry following.  Continue mobilizing with nursing.  Patient is medically cleared for discharge to inpatient psychiatric facility.      I have discussed this patient's plan of care and discharge plan at IDT rounds today with Case Management, Nursing, Nursing leadership, and other members of the IDT team.    Consultants/Specialty  psychiatry    Code Status  Full Code    Disposition  Medically Cleared  I have placed the appropriate orders for post-discharge needs.    Review of Systems  Review of Systems   Gastrointestinal:  Positive for diarrhea. Negative for constipation.   Musculoskeletal:  Positive for back pain and joint pain.   Neurological:  Negative for dizziness, loss of consciousness and weakness.        Physical Exam  Temp:  [36.2 °C (97.2 °F)-36.6 °C (97.9 °F)] 36.2 °C (97.2 °F)  Pulse:  [79-87] 80  Resp:  [15-16] 16  BP: (108-151)/(62-78) 151/78  SpO2:  [94 %-95 %] 94 %    Physical Exam  Vitals and nursing note reviewed. Exam conducted with a chaperone present (Sitter at bedside).   Constitutional:       General: He is not in acute distress.     Appearance: Normal appearance. He is not ill-appearing, toxic-appearing or diaphoretic.   HENT:      Head: Normocephalic.      Ears:      Comments: +Presbycusis     Nose: Nose normal.      Mouth/Throat:      Mouth: Mucous membranes are moist.   Eyes:      General: No scleral  icterus.     Conjunctiva/sclera: Conjunctivae normal.   Pulmonary:      Effort: Pulmonary effort is normal. No respiratory distress.   Genitourinary:     Comments: No jiménez  Skin:     General: Skin is warm and dry.   Neurological:      Mental Status: He is alert.      Comments: Appropriately conversant, moves all extremities   Psychiatric:         Attention and Perception: Attention and perception normal.         Mood and Affect: Mood normal. Affect is blunt.         Speech: Speech normal.         Behavior: Behavior normal. Behavior is cooperative.         Thought Content: Thought content normal.         Cognition and Memory: Cognition and memory normal.         Judgment: Judgment normal.       Fluids    Intake/Output Summary (Last 24 hours) at 2/27/2025 1004  Last data filed at 2/27/2025 0626  Gross per 24 hour   Intake --   Output 680 ml   Net -680 ml      Laboratory                            Imaging  DX-SHOULDER 2+ RIGHT   Final Result      1.  No evidence of right shoulder fracture or dislocation.      2.  Moderate osteoarthritic changes of the right AC joint      [id number] WITH   Final Result      1.  Unremarkable orbits.   2.  No facial soft tissue swelling or mass.   3.  Paranasal sinuses are clear.      MR-THORACIC SPINE-W/O   Final Result      1.  Moderate chronic compression deformity at T12.   2.  No acute fracture.   3.  Multifocal degenerative disease as described above.   4.  There has been no significant interval change.      MR-LUMBAR SPINE-W/O   Final Result      1.  Postsurgical and degenerative changes as described above.   2.  Moderate chronic compression deformity at T12.   3.  No acute abnormality.   4.  There has been no significant interval change.      CT-LSPINE W/O PLUS RECONS   Final Result      1. No acute fracture or subluxation involving the lumbar spine.   2. Stable anterior wedge compression deformity of the superior endplate of T12.   3. Stable degenerative grade 1  anterolisthesis of L4 with respect to L3.   4. New postsurgical changes of posterior lateral interbody fusion at L4-5.   5. Horseshoe kidney.   6. Circumferential bladder wall thickening.      CT-TSPINE W/O PLUS RECONS   Final Result      Chronic appearing fractures without a definite acute fracture or change in alignment.      CT-CSPINE WITHOUT PLUS RECONS   Final Result      1. No acute osseous injury of the cervical spine.   2. Stable postsurgical changes of ACDF from C4 through C7.   3. Stable multilevel cervical spondylosis.      CT-HEAD W/O   Final Result      1. Age-related central and cortical atrophy.   2. No acute intracranial abnormality.                    Assessment/Plan  * Urinary retention- (present on admission)  Assessment & Plan  Improved  Patient with acute urinary retention after fall, given extensive back history concern for potential cord abnormality.    Unable to tolerate A1AT due to orthostasis  If worsening retention but improved orthostasis, will discuss with Urology if inpatient TURP is feasible  Bladder scan prn, straight cath for PVR>400cc    Chronic midline back pain- (present on admission)  Assessment & Plan  MRI demonstrates chronic compression fracture and degenerative changes - nonoperative management  Opiates contraindicated due to urinary retention, chronic pain, and mood DO  Multimodal regimen:  Increased gabapentin to 600 mg TID  Scheduled NSAID - HELD DURING STEROIDS  SNRI - venlafaxine  Scheduled Muscle relaxant - robaxin QID  Lidoderm topical therapy  Warm compress  Psychiatry consultation for CBD  Refractory -  5-day course of steroids 2/20-2/24    Orthostatic hypotension  Assessment & Plan  Recurrent  Exacerbated by high-dose tamsulosin, not improved with doxazosin  Holding A1AT and will assess UOP  Cosyntropin stimulation test negative 2/23 for AI  Orthostatic VS and IVF boluses as needed    Proliferative diabetic retinopathy of both eyes without macular edema associated  with type 2 diabetes mellitus (HCC)- (present on admission)  Assessment & Plan  Complains of worsening vision  Patient Previous saw Soraida Bustamante, OD on 10/5/2024.  Exam is uploaded into the media tab.  At that time his right eye visual acuity was 20/40-2, left eye 20/150-1.  Referral was placed to [facility] for diabetic retinopathy and cataracts but patient has been unable to follow-up.  Patient complained of progressively worsening vision more acutely over the past few days.  He had a CT orbits on 2/16 which was unremarkable  Follow up with outpatient ophthalmologist    Frequent falls- (present on admission)  Assessment & Plan  Patient with frequent falls due to chronic and progressively worsening bilateral lower extremity weakness with multiple different spinal surgeries.  Patient does report chronic bowel incontinence and recently has developed urinary retention after previous fall with concern for potential cord compression/abnormality.  Lower extremity weakness 4+ out of 5, likely generalized, however asked by ER provider to admit for MRI evaluation  -MRI of thoracic and lumbar spine show chronic compression fracture at T12, degenerative changes.  No acute fracture  Neurosurgery has recommended for outpatient follow-up and possible epidural injections  - PT/OT recommend post-acute, not a candidate due to legal hold for SI    Suicidal ideation- (present on admission)  Assessment & Plan  Currently on legal hold for suicidal ideation due to his current health  Psychiatry consulted, extended legal hold  He has not contested, expect 1 week extension    Acute hypoxic respiratory failure (HCC)- (present on admission)  Assessment & Plan  Resolved    Uncontrolled type 2 diabetes mellitus with hyperglycemia (HCC)- (present on admission)  Assessment & Plan  A1c 9.8  Insulin discontinued due to difficulty managing with impaired dexterity / vision and risk of hypoglycemia  Initiate additional pharmacotherapy  (SGLT2, GLP1, etc) after discharge       VTE prophylaxis: Lovenox

## 2025-02-27 NOTE — CARE PLAN
The patient is Stable - Low risk of patient condition declining or worsening    Shift Goals  Clinical Goals: safety  Patient Goals: comfort  Family Goals: no family present    Progress made toward(s) clinical / shift goals:    Problem: Pain - Standard  Goal: Alleviation of pain or a reduction in pain to the patient’s comfort goal  Outcome: Progressing   Educated on pain scale. Encouraged to verbalize pain. Will medicate as per MAR     Problem: Knowledge Deficit - Standard  Goal: Patient and family/care givers will demonstrate understanding of plan of care, disease process/condition, diagnostic tests and medications  Outcome: Progressing   POC discussed with the patient. 1:1/Safety sitter at the bedside. On Legal hold.      Problem: Provide Safe Environment  Goal: Suicide environmental safety, protocols, policies, and practices will be implemented  Outcome: Progressing       Patient is not progressing towards the following goals:

## 2025-02-27 NOTE — DISCHARGE PLANNING
[facility] ALERT TEAM DISCHARGE PLANNING NOTE    Date:  02/27/2025  Patient Name:  Christoph Taylor - 59 y.o. - Discharge Planning  MRN:  [id number]   YOB: 1965  ADMISSION DATE:  2/10/2025     Writer forwarded referral packet for inpatient psychiatric care to the following community providers:  [facility], [facility], [facility], and  [facility]     Items included in the referral packet:   __x___Face Sheet   __x___Pages 1 and 2 of completed legal hold   ___x__Alert Team/Psych Assessment   ___x__H&P   ___x__UDS   _____Blood Alcohol   ___x__Vital signs   _____Pregnancy Test (if applicable)   ___x__Medications List   _____Covid Screen

## 2025-02-28 VITALS
RESPIRATION RATE: 17 BRPM | OXYGEN SATURATION: 96 % | BODY MASS INDEX: 21.67 KG/M2 | HEART RATE: 84 BPM | SYSTOLIC BLOOD PRESSURE: 115 MMHG | DIASTOLIC BLOOD PRESSURE: 68 MMHG | TEMPERATURE: 98 F | HEIGHT: 72 IN | WEIGHT: 160 LBS

## 2025-02-28 PROCEDURE — 51798 US URINE CAPACITY MEASURE: CPT

## 2025-02-28 PROCEDURE — A9270 NON-COVERED ITEM OR SERVICE: HCPCS

## 2025-02-28 PROCEDURE — 99231 SBSQ HOSP IP/OBS SF/LOW 25: CPT | Performed by: STUDENT IN AN ORGANIZED HEALTH CARE EDUCATION/TRAINING PROGRAM

## 2025-02-28 PROCEDURE — A9270 NON-COVERED ITEM OR SERVICE: HCPCS | Performed by: STUDENT IN AN ORGANIZED HEALTH CARE EDUCATION/TRAINING PROGRAM

## 2025-02-28 PROCEDURE — 770001 HCHG ROOM/CARE - MED/SURG/GYN PRIV*

## 2025-02-28 PROCEDURE — A9270 NON-COVERED ITEM OR SERVICE: HCPCS | Performed by: INTERNAL MEDICINE

## 2025-02-28 PROCEDURE — 97535 SELF CARE MNGMENT TRAINING: CPT

## 2025-02-28 PROCEDURE — 700102 HCHG RX REV CODE 250 W/ 637 OVERRIDE(OP): Performed by: INTERNAL MEDICINE

## 2025-02-28 PROCEDURE — 700102 HCHG RX REV CODE 250 W/ 637 OVERRIDE(OP): Performed by: STUDENT IN AN ORGANIZED HEALTH CARE EDUCATION/TRAINING PROGRAM

## 2025-02-28 PROCEDURE — 700102 HCHG RX REV CODE 250 W/ 637 OVERRIDE(OP): Performed by: EMERGENCY MEDICINE

## 2025-02-28 PROCEDURE — A9270 NON-COVERED ITEM OR SERVICE: HCPCS | Performed by: EMERGENCY MEDICINE

## 2025-02-28 PROCEDURE — 700101 HCHG RX REV CODE 250: Performed by: STUDENT IN AN ORGANIZED HEALTH CARE EDUCATION/TRAINING PROGRAM

## 2025-02-28 PROCEDURE — 700111 HCHG RX REV CODE 636 W/ 250 OVERRIDE (IP): Mod: JZ

## 2025-02-28 PROCEDURE — 700102 HCHG RX REV CODE 250 W/ 637 OVERRIDE(OP)

## 2025-02-28 RX ADMIN — Medication 10 MG: at 21:26

## 2025-02-28 RX ADMIN — METHOCARBAMOL TABLETS 750 MG: 750 TABLET, COATED ORAL at 12:18

## 2025-02-28 RX ADMIN — METHOCARBAMOL TABLETS 750 MG: 750 TABLET, COATED ORAL at 21:26

## 2025-02-28 RX ADMIN — ENOXAPARIN SODIUM 40 MG: 100 INJECTION SUBCUTANEOUS at 18:06

## 2025-02-28 RX ADMIN — PIOGLITAZONE 30 MG: 30 TABLET ORAL at 06:12

## 2025-02-28 RX ADMIN — METHYLPHENIDATE HYDROCHLORIDE 5 MG: 5 TABLET ORAL at 12:18

## 2025-02-28 RX ADMIN — LIDOCAINE 2 PATCH: 4 PATCH TOPICAL at 12:19

## 2025-02-28 RX ADMIN — METHOCARBAMOL TABLETS 750 MG: 750 TABLET, COATED ORAL at 07:47

## 2025-02-28 RX ADMIN — METHOCARBAMOL TABLETS 750 MG: 750 TABLET, COATED ORAL at 18:07

## 2025-02-28 RX ADMIN — GABAPENTIN 600 MG: 300 CAPSULE ORAL at 18:07

## 2025-02-28 RX ADMIN — GABAPENTIN 600 MG: 300 CAPSULE ORAL at 06:12

## 2025-02-28 RX ADMIN — VENLAFAXINE HYDROCHLORIDE 225 MG: 75 CAPSULE, EXTENDED RELEASE ORAL at 07:47

## 2025-02-28 RX ADMIN — CALCIUM POLYCARBOPHIL 625 MG: 625 TABLET, FILM COATED ORAL at 18:07

## 2025-02-28 RX ADMIN — GABAPENTIN 600 MG: 300 CAPSULE ORAL at 12:19

## 2025-02-28 RX ADMIN — SITAGLIPTIN 100 MG: 100 TABLET, FILM COATED ORAL at 06:12

## 2025-02-28 RX ADMIN — CALCIUM POLYCARBOPHIL 625 MG: 625 TABLET, FILM COATED ORAL at 12:18

## 2025-02-28 RX ADMIN — GLIPIZIDE 10 MG: 5 TABLET ORAL at 07:47

## 2025-02-28 RX ADMIN — OMEPRAZOLE 40 MG: 20 CAPSULE, DELAYED RELEASE ORAL at 06:12

## 2025-02-28 RX ADMIN — METHYLPHENIDATE HYDROCHLORIDE 5 MG: 5 TABLET ORAL at 07:47

## 2025-02-28 RX ADMIN — Medication 1000 UNITS: at 06:11

## 2025-02-28 ASSESSMENT — COGNITIVE AND FUNCTIONAL STATUS - GENERAL
DAILY ACTIVITIY SCORE: 22
HELP NEEDED FOR BATHING: A LITTLE
SUGGESTED CMS G CODE MODIFIER DAILY ACTIVITY: CJ
DRESSING REGULAR LOWER BODY CLOTHING: A LITTLE

## 2025-02-28 ASSESSMENT — ANXIETY QUESTIONNAIRES
GAD7 TOTAL SCORE: 14
6. BECOMING EASILY ANNOYED OR IRRITABLE: MORE THAN HALF THE DAYS
2. NOT BEING ABLE TO STOP OR CONTROL WORRYING: NEARLY EVERY DAY
4. TROUBLE RELAXING: NEARLY EVERY DAY
1. FEELING NERVOUS, ANXIOUS, OR ON EDGE: SEVERAL DAYS
5. BEING SO RESTLESS THAT IT IS HARD TO SIT STILL: NOT AT ALL
7. FEELING AFRAID AS IF SOMETHING AWFUL MIGHT HAPPEN: MORE THAN HALF THE DAYS
3. WORRYING TOO MUCH ABOUT DIFFERENT THINGS: NEARLY EVERY DAY
IF YOU CHECKED OFF ANY PROBLEMS ON THIS QUESTIONNAIRE, HOW DIFFICULT HAVE THESE PROBLEMS MADE IT FOR YOU TO DO YOUR WORK, TAKE CARE OF THINGS AT HOME, OR GET ALONG WITH OTHER PEOPLE: SOMEWHAT DIFFICULT

## 2025-02-28 ASSESSMENT — PATIENT HEALTH QUESTIONNAIRE - PHQ9
5. POOR APPETITE OR OVEREATING: 1 - SEVERAL DAYS
SUM OF ALL RESPONSES TO PHQ QUESTIONS 1-9: 15

## 2025-02-28 ASSESSMENT — ENCOUNTER SYMPTOMS
LOSS OF CONSCIOUSNESS: 0
CONSTIPATION: 0
DIARRHEA: 1
WEAKNESS: 0
BACK PAIN: 1
DIZZINESS: 0

## 2025-02-28 ASSESSMENT — PAIN DESCRIPTION - PAIN TYPE: TYPE: ACUTE PAIN

## 2025-02-28 NOTE — PROGRESS NOTES
[facility] Medicine Daily Progress Note    Date of Service  2/28/2025    Chief Complaint  Christoph Taylor is a 59 y.o. male admitted 2/10/2025 with   Chief Complaint   Patient presents with    T-5000 FALL    Back Pain         Hospital Course  Patient is a 59-year-old male with extensive past medical history of poorly controlled type 2 diabetes mellitus, failure to thrive, and prior suicidal ideations secondary to major depressive disorder as well as his chronic back pain status post multiple laminectomies and fractures who presents due to recurrent falls as well as progressive worsening back pain. Patient was initially held in the emergency department due to suicidal ideation, did not meet any admission criteria, and had progressive worsening of his bilateral lower extremity. Patient reports he chronically has fecal incontinence for the past several months, no other new neurologic symptoms. However, patient does report new onset of acute urinary retention prompting ER provider to order stat MRI of thoracic and lumbar spine due to previous evidence of cord compression.     MRI of thoracic and lumbar showed chronic compression deformity at T12.  Postsurgical and degenerative changes.  No acute fracture.  No significant interval change.    Patient had persistent urinary retention requiring Crowley catheter placement.  Started on tamsulosin.    Patient has difficulty taking care of himself.  He has difficulty managing his diabetes.  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  Also states decreased range of motion and flexibility, dexterity of his bilateral hands which is previous cervical surgery was supposed to fix.  States he has a lot of trouble with glucometer, lancets, using insulin pens.  He was previously switched to oral hypoglycemic agents but still was not under good control and he would still like to be able to check his blood sugar because it dropped severely low previously while he  was at [place].  He apparently started having convulsions and they thought a cardiac arrest.  He also has poor vision making very difficult to do manage his meds and glucometer.  Previously saw optometry and diagnosed with diabetic retinopathy and cataracts.    Was placed on legal hold for suicidal ideations.    Interval Problem Update  No acute events overnight.  Patient reports worsening hearing of left ear, will check his ears with otoscope. No ear pain or tinnitus. He has chronic hearing loss in right ear per patient.  He is urinating well, post void residuals have been good.  He had loose stool yesterday but none today. Will start fiber to help as bulking agent for any loose stool. Continue imodium prn.  He denies any lightheadedness/dizziness when transferring to chair this morning. Encouraged activity and ambulating with nursing. Monitor for any orthostatic hypotension.  Remains on legal hold. Psychiatry following.  Patient is medically cleared for discharge to inpatient psychiatric facility.      I have discussed this patient's plan of care and discharge plan at IDT rounds today with Case Management, Nursing, Nursing leadership, and other members of the IDT team.    Consultants/Specialty  psychiatry    Code Status  Full Code    Disposition  Medically Cleared  I have placed the appropriate orders for post-discharge needs.    Review of Systems  Review of Systems   Gastrointestinal:  Positive for diarrhea. Negative for constipation.   Musculoskeletal:  Positive for back pain and joint pain.   Neurological:  Negative for dizziness, loss of consciousness and weakness.        Physical Exam  Temp:  [36.3 °C (97.3 °F)-36.7 °C (98.1 °F)] 36.6 °C (97.9 °F)  Pulse:  [79-90] 89  Resp:  [16-17] 16  BP: (125-161)/(68-92) 161/92  SpO2:  [94 %-96 %] 95 %    Physical Exam  Vitals and nursing note reviewed. Exam conducted with a chaperone present (Sitter at bedside).   Constitutional:       General: He is not in acute  distress.     Appearance: Normal appearance. He is not ill-appearing, toxic-appearing or diaphoretic.   HENT:      Head: Normocephalic.      Ears:      Comments: +Presbycusis     Nose: Nose normal.      Mouth/Throat:      Mouth: Mucous membranes are moist.   Eyes:      General: No scleral icterus.     Conjunctiva/sclera: Conjunctivae normal.   Pulmonary:      Effort: Pulmonary effort is normal. No respiratory distress.   Genitourinary:     Comments: No jiménez  Skin:     General: Skin is warm and dry.   Neurological:      Mental Status: He is alert.      Comments: Appropriately conversant, moves all extremities   Psychiatric:         Attention and Perception: Attention and perception normal.         Mood and Affect: Mood normal. Affect is blunt.         Speech: Speech normal.         Behavior: Behavior normal. Behavior is cooperative.         Thought Content: Thought content normal.         Cognition and Memory: Cognition and memory normal.         Judgment: Judgment normal.       Fluids    Intake/Output Summary (Last 24 hours) at 2/28/2025 1000  Last data filed at 2/28/2025 0914  Gross per 24 hour   Intake --   Output 2360 ml   Net -2360 ml      Laboratory                            Imaging  DX-SHOULDER 2+ RIGHT   Final Result      1.  No evidence of right shoulder fracture or dislocation.      2.  Moderate osteoarthritic changes of the right AC joint      [id number] WITH   Final Result      1.  Unremarkable orbits.   2.  No facial soft tissue swelling or mass.   3.  Paranasal sinuses are clear.      MR-THORACIC SPINE-W/O   Final Result      1.  Moderate chronic compression deformity at T12.   2.  No acute fracture.   3.  Multifocal degenerative disease as described above.   4.  There has been no significant interval change.      MR-LUMBAR SPINE-W/O   Final Result      1.  Postsurgical and degenerative changes as described above.   2.  Moderate chronic compression deformity at T12.   3.  No acute abnormality.    4.  There has been no significant interval change.      CT-LSPINE W/O PLUS RECONS   Final Result      1. No acute fracture or subluxation involving the lumbar spine.   2. Stable anterior wedge compression deformity of the superior endplate of T12.   3. Stable degenerative grade 1 anterolisthesis of L4 with respect to L3.   4. New postsurgical changes of posterior lateral interbody fusion at L4-5.   5. Horseshoe kidney.   6. Circumferential bladder wall thickening.      CT-TSPINE W/O PLUS RECONS   Final Result      Chronic appearing fractures without a definite acute fracture or change in alignment.      CT-CSPINE WITHOUT PLUS RECONS   Final Result      1. No acute osseous injury of the cervical spine.   2. Stable postsurgical changes of ACDF from C4 through C7.   3. Stable multilevel cervical spondylosis.      CT-HEAD W/O   Final Result      1. Age-related central and cortical atrophy.   2. No acute intracranial abnormality.                    Assessment/Plan  * Urinary retention- (present on admission)  Assessment & Plan  Improved  Patient with acute urinary retention after fall, given extensive back history concern for potential cord abnormality.    Unable to tolerate A1AT due to orthostasis  If worsening retention but improved orthostasis, will discuss with Urology if inpatient TURP is feasible  Bladder scan prn, straight cath for PVR>400cc    Chronic midline back pain- (present on admission)  Assessment & Plan  MRI demonstrates chronic compression fracture and degenerative changes - nonoperative management  Opiates contraindicated due to urinary retention, chronic pain, and mood DO  Multimodal regimen:  Increased gabapentin to 600 mg TID  Scheduled NSAID - HELD DURING STEROIDS  SNRI - venlafaxine  Scheduled Muscle relaxant - robaxin QID  Lidoderm topical therapy  Warm compress  Psychiatry consultation for CBD  Refractory -  5-day course of steroids 2/20-2/24    Orthostatic hypotension  Assessment &  Plan  Recurrent  Exacerbated by high-dose tamsulosin, not improved with doxazosin  Improving, have stopped BPH medications thankfully he is urinating well  Cosyntropin stimulation test negative 2/23 for AI  Orthostatic VS and IVF boluses as needed    Proliferative diabetic retinopathy of both eyes without macular edema associated with type 2 diabetes mellitus (HCC)- (present on admission)  Assessment & Plan  Complains of worsening vision  Patient Previous saw Soraida Bustamante, OD on 10/5/2024.  Exam is uploaded into the media tab.  At that time his right eye visual acuity was 20/40-2, left eye 20/150-1.  Referral was placed to [facility] for diabetic retinopathy and cataracts but patient has been unable to follow-up.  Patient complained of progressively worsening vision more acutely over the past few days.  He had a CT orbits on 2/16 which was unremarkable  Follow up with outpatient ophthalmologist    Frequent falls- (present on admission)  Assessment & Plan  Patient with frequent falls due to chronic and progressively worsening bilateral lower extremity weakness with multiple different spinal surgeries.  Patient does report chronic bowel incontinence and recently has developed urinary retention after previous fall with concern for potential cord compression/abnormality.  Lower extremity weakness 4+ out of 5, likely generalized, however asked by ER provider to admit for MRI evaluation  -MRI of thoracic and lumbar spine show chronic compression fracture at T12, degenerative changes.  No acute fracture  Neurosurgery has recommended for outpatient follow-up and possible epidural injections  - PT/OT recommend post-acute, not a candidate due to legal hold for SI    Suicidal ideation- (present on admission)  Assessment & Plan  Currently on legal hold for suicidal ideation due to his current health  Psychiatry consulted, extended legal hold  He has not contested, expect 1 week extension    Acute hypoxic respiratory  failure (HCC)- (present on admission)  Assessment & Plan  Resolved    Uncontrolled type 2 diabetes mellitus with hyperglycemia (HCC)- (present on admission)  Assessment & Plan  A1c 9.8  Insulin discontinued due to difficulty managing with impaired dexterity / vision and risk of hypoglycemia  Initiate additional pharmacotherapy (SGLT2, GLP1, etc) after discharge       VTE prophylaxis: Lovenox

## 2025-02-28 NOTE — CONSULTS
"[facility] Assessment Follow up     Name: Christoph Taylor  MRN: [id number]  : 1965  Age: 59 y.o.  Date of assessment: 25  PCP: DIANA Tariq  Persons in attendance: Patient    HPI: Per Medical Record  \"Patient is a 59-year-old male with extensive past medical history of poorly controlled type 2 diabetes mellitus, failure to thrive, and prior suicidal ideations secondary to major depressive disorder as well as his chronic back pain status post multiple laminectomies and fractures who presents due to recurrent falls as well as progressive worsening back pain. Patient was initially held in the emergency department due to suicidal ideation, did not meet any admission criteria, and had progressive worsening of his bilateral lower extremity. Patient reports he chronically has fecal incontinence for the past several months, no other new neurologic symptoms. However, patient does report new onset of acute urinary retention prompting ER provider to order stat MRI of thoracic and lumbar spine due to previous evidence of cord compression.    MRI of thoracic and lumbar showed chronic compression deformity at T12.  Postsurgical and degenerative changes.  No acute fracture.  No significant interval change.   Patient had persistent urinary retention requiring Crowley catheter placement.  Started on tamsulosin.   Patient has difficulty taking care of himself.  He has difficulty managing his diabetes.  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  Also states decreased range of motion and flexibility, dexterity of his bilateral hands which is previous cervical surgery was supposed to fix.  States he has a lot of trouble with glucometer, lancets, using insulin pens.  He was previously switched to oral hypoglycemic agents but still was not under good control and he would still like to be able to check his blood sugar because it dropped severely low previously while he " "was at [facility].  He apparently started having convulsions and they thought a cardiac arrest.  He also has poor vision making very difficult to do manage his meds and glucometer.  Previously saw optometry and diagnosed with diabetic retinopathy and cataracts.   Was placed on legal hold for suicidal ideations.\" Patient was [facility] for psychotherapy due to Legal Hold and suicidal ideations.    Psychotherapy Session Summary (as stated by Patient): Christoph Taylor is a 59 year old male seen sitting up in the chair next to his hospital bed alert and oriented. Patients speech was clear and coherent. Patient was engaged in interview process and denies auditory or visual hallucinations. Patient denies homicidal ideations.     Patient continued to express concern regarding his limited vision and hearing. Patient stated medical team will be assessing his hearing later today which eased anxiety level regarding his hearing. Patient reports being informed we will be unable to assess his vision while in the hospital. Patient reports his sitters assisting him with tasks associated with reading, expressing gratitude for their help.    Patient has continued to engage in tasks agreed upon with clinician including getting his debit card activated, purchasing a new cell phone, and engaging with his realtor. Patient reports successfully activating his debit card. Patient reports running into barriers with the delivery of his cell phone, however he was able to coordinate with his neighbor to sign for him when the package is delivered to his house on 3/3/25. Patient reports continuing to engage with his realtor regarding the sale of his property. Clinician encouraged patient to maintain a future focused, positive outlook despite challenges that arise with tasks to help with distress tolerance.    Patient reports continued anxiety related to his dog. Patient states he is considering undergoing the process of " "training his dog to be a certified service dog. Patient is hopeful this will increase the likelihood of a SNF accepting his dog with him when he is transferred to a new facility.    Clinician continued to challenge patient regarding the negative outlook he can hold when engaging with others and on life in general. Patient was able to acknowledge how a negative perspective on life can discourage friends or family to engage with him. Patient was able to recognize the impact a more positive outlook has made the last couple of days, with the sitters being more likely to start conversations with him and him making more progress on his assigned tasks.    Patient reports limited thoughts or feelings about suicide at the time of assessment stating, \"I don't believe that's an option for me anymore\". Patient has identified a major reason for him to live as continuing to be the caretaker for his dog and continuing making progress on the identified tasks. Clinician gave patient a safety plan to fill out and gave a copy to patient once completed. Clinician will extend legal hold through the weekend and reassess suicidal ideation on [place] 3/2/25.      Chief Complaint   Patient presents with    T-5000 FALL    Back Pain        Psychiatric Review of Systems    BHUMI-7 Questionnaire  Feeling nervous, anxious, or on edge:  Several days   Not being able to sop or control worrying:  Nearly every day   Worrying too much about different things:  Nearly every day   Trouble relaxing:  Nearly every day   Being so restless that it's hard to sit still:  Not at all   Becoming easily annoyed or irritable:  More than half the days   Feeling afraid as if something awful might happen:  More than half the days   Total:  14   How difficult  have these problems made it for you to do your work, take care of things at home, or get along with other people? - Somewhat difficult    Interpretation of BHUMI 7 Total Score   Score Severity: 0-4 No Anxiety, 5-9 " Mild Anxiety, 10-14 Moderate Anxiety, 15-21 Severe Anxiety    PHQ-9 Depression Screening    Little interest or pleasure in doing things?  3 - nearly every day   Feeling down, depressed, or hopeless?  3 - nearly every day   Trouble falling or staying asleep, or sleeping too much?   1 - several days   Feeling tired or having little energy?  1 - several days   Poor appetite or overeating?   1 - several days   Feeling bad about yourself - or that you are a failure or have let yourself or your family down?  3 - nearly every day   Trouble concentrating on things, such as reading the newspaper or watching television?  2 - more than half the days   Moving or speaking so slowly that other people could have noticed.  Or the opposite - being so fidgety or restless that you have been moving around a lot more than usual?   0 - not at all   Thoughts that you would be better off dead, or of hurting yourself?   1 - several days   Patient Health Questionnaire Score:  15     Interpretation of PHQ-9 Total Score   Score Severity: 1-4 No Depression, 5-9 Mild Depression, 10-14 Moderate Depression, 15-19 Moderately Severe Depression, 20-27 Severe Depression    MoCA Performed?:  N/A    SAFETY ASSESSMENT - SELF  Does patient acknowledge current or past symptoms of dangerousness to self? yes   Does parent/significant other report patient has current or past symptoms of dangerousness to self? N\A     Does presenting problem suggest symptoms of dangerousness to self? No      SAFETY ASSESSMENT - OTHERS  Does patient acknowledge current or past symptoms of aggressive behavior or risk to others? no   Does parent/significant other report patient has current or past symptoms of aggressive behavior or risk to others? N\A     Does presenting problem suggest symptoms of dangerousness to others?  No    Crisis Safety Plan completed and copy given to patient? yes    SUBSTANCE USE SCREENING  Not assessed at this time    MENTAL STATUS  Participation Active  verbal participation   Grooming Disheveled   Orientation Alert   Behavior Tense   Eye contact Limited   Mood Depressed and Anxious   Affect Sad and Anxious   Thought Process Circumstantial   Thought Content Within normal limits   Speech Soft   Perception Within normal limits   Memory No gross evidence of memory deficits   Insight Limited   Judgement Limited   Other      Collateral information:   Source: [facility], [facility], and  Other: Psychiatrist    Unable to complete full assessment due to:  NA - Assessment completed    CLINICAL IMPRESSIONS:  Primary:  Major Depressive Disorder, Severe  Secondary:  R/O Cluster B personality traits                                         Recommendations and Observation Level:  Sitter: 1:1  Phone: yes and hospital phone  Visitors: yes with supervision  Personal belongings: Glasses, wallet, iPad  Patient may have bedside table tray      Legal Hold: Emelia Anderson, Student  Farzana Tripathi, Ph.D., [facility]  2/28/2025    Length of Intervention:  60 minutes

## 2025-02-28 NOTE — DISCHARGE PLANNING
Alert Team:    PT declined from [facility] d/t fecal incontinence and urinary retention.     PENNIE RodriguesA

## 2025-02-28 NOTE — CONSULTS
[facility]  Crisis/Safety Plan    Name:  Christoph Taylor  MRN:  [id number]  Date:  2025    Warning signs that a crisis may be developing for me or I may be at risk:  1) Feeling overwhelmed   2) Feeling Anxious  3) Feeling depressed    Coping strategies I can use on my own (relaxation, physical activity, etc):  1) Breathing techniques  2) Taking time out for myself  3) Handling things one at a time    Ways I can make my environment safe:  1) Keeping my floors free of things to move  2) Asking for help for things I can't handle on my own  3) Try not to do too much    Things I want to tell myself when I feel a crisis developin) Stay calm  2) Take a deep breath  3) Talk to someone    People I can contact for support or distraction (and their phone numbers):  1) Nickolas (son) [phone]  2) Steven (son) [phone]  3) Telma (sister) [phone]    If I’m not able to reach my support people, or the above strategies don’t help, I can contact the following professionals, agencies, or hotlines:  1) Crisis Call Center ():  1-492.902.9203 -OR- ([phone]) [phone]  2) Crisis Text Line ():  Text CARE TO 825856  3)   4)     Joon Anderson, Student  Farzana Tripathi, Ph.D., [facility]

## 2025-02-28 NOTE — CARE PLAN
The patient is Stable - Low risk of patient condition declining or worsening    Shift Goals  Clinical Goals: safety  Patient Goals: sleep  Family Goals: not present    Progress made toward(s) clinical / shift goals:    Problem: Pain - Standard  Goal: Alleviation of pain or a reduction in pain to the patient’s comfort goal  Outcome: Progressing     Problem: Knowledge Deficit - Standard  Goal: Patient and family/care givers will demonstrate understanding of plan of care, disease process/condition, diagnostic tests and medications  Outcome: Progressing     Problem: Safety  Goal: Will remain free from injury  Outcome: Progressing

## 2025-02-28 NOTE — THERAPY
"Occupational Therapy  Daily Treatment     Patient Name: Christoph Taylor  Age:  59 y.o., Sex:  male  Medical Record #: [id number]  Today's Date: 2/28/2025     Precautions  Precautions: Fall Risk  Comments: Lft LE AFO    Assessment  Pt was seen for OT tx, pt is currently functioning near his baseline. Pt has limitations w/mobility d/t poor fitting AFO and additional chronic underlying issues. Pt has and will continue to have difficulty w/completing ADL and IADL tasks d/t poor hand dexterity and impaired grasp. Pts hand are contracted and would likely have more independence w/use of adaptive equipment such as , button hook and alternative clothing or physical assist. OT will be reducing frequency and following at a distance. Pt may benefit from post acute to maximize adaptive IADl's but most likely needs a more supportive dc environment given his chronic limitations.     Plan  Treatment Plan Status: Modify Current Treatment Plan  Type of Treatment: Self Care / Activities of Daily Living, Adaptive Equipment, Neuro Re-Education / Balance, Therapeutic Exercises, Therapeutic Activity  Treatment Frequency: 1 Time per Week  Treatment Duration: Until Therapy Goals Met    DC Equipment Recommendations: Unable to determine at this time  Discharge Recommendations: Recommend post-acute placement for additional occupational therapy services prior to discharge home (LTC/)    Subjective    \"I know this stuff isn't going to get better\"     Objective       02/28/25 1253   Cognition    Cognition / Consciousness WDL   Level of Consciousness Alert   Other Treatments   Other Treatments Provided Discussed long term plan/picture as patients functional issues are chronic in nature pt was able to acknowlege that he likely needs more help and a different home environment   Balance   Sitting Balance (Static) Good   Sitting Balance (Dynamic) Fair +   Standing Balance (Static) Fair   Standing Balance (Dynamic) Fair -   Weight Shift Sitting " Fair   Weight Shift Standing Fair   Skilled Intervention Verbal Cuing   Comments w/fww   Bed Mobility    Comments up in chair pre/post   Activities of Daily Living   Grooming Seated;Modified Independent   Upper Body Dressing Modified Independent   Lower Body Dressing Modified Independent  (except for AFO which he is not able to don himself)   Toileting Supervision   Skilled Intervention Verbal Cuing;Tactile Cuing   Comments pt is using urinal and bSC   How much help from another person does the patient currently need...   6 Clicks Daily Activity Score 22   Functional Mobility   Sit to Stand Supervised   Bed, Chair, Wheelchair Transfer Standby Assist   Skilled Intervention Verbal Cuing;Tactile Cuing   Activity Tolerance   Comments no c/o pain or fatigue or dizziness   Patient / Family Goals   Patient / Family Goal #1 getting new AFO fit   Goal #1 Outcome Goal not met   Short Term Goals   Short Term Goal # 1 Pt will demo LB dressing using AD PRN SPV   Goal Outcome # 1 Goal met   Short Term Goal # 2 Pt will complete toilet txf SPV   Goal Outcome # 2 Progressing as expected   Short Term Goal # 3 Pt will demo toileting hygiene SPV   Goal Outcome # 3 Progressing as expected   Short Term Goal # 4 Pt will tolerate seated g/h routine SPV   Goal Outcome # 4 Goal met   Education Group   Role of Occupational Therapist Patient Response Patient;Acceptance;Explanation   Occupational Therapy Treatment Plan    O.T. Treatment Plan Modify Current Treatment Plan   Treatment Frequency 1 Time per Week   Anticipated Discharge Equipment and Recommendations   Discharge Recommendations Recommend post-acute placement for additional occupational therapy services prior to discharge home  (LTC/)   Interdisciplinary Plan of Care Collaboration   IDT Collaboration with  Nursing   Patient Position at End of Therapy Seated;Other (Comments)  (sitter present)   Collaboration Comments RN aware of session   Session Information   Date / Session Number   2/28 #3 (1/1, 3/5)

## 2025-03-01 PROCEDURE — 700102 HCHG RX REV CODE 250 W/ 637 OVERRIDE(OP): Performed by: STUDENT IN AN ORGANIZED HEALTH CARE EDUCATION/TRAINING PROGRAM

## 2025-03-01 PROCEDURE — 51798 US URINE CAPACITY MEASURE: CPT

## 2025-03-01 PROCEDURE — A9270 NON-COVERED ITEM OR SERVICE: HCPCS | Performed by: EMERGENCY MEDICINE

## 2025-03-01 PROCEDURE — 700102 HCHG RX REV CODE 250 W/ 637 OVERRIDE(OP)

## 2025-03-01 PROCEDURE — 700111 HCHG RX REV CODE 636 W/ 250 OVERRIDE (IP): Mod: JZ

## 2025-03-01 PROCEDURE — 700101 HCHG RX REV CODE 250: Performed by: STUDENT IN AN ORGANIZED HEALTH CARE EDUCATION/TRAINING PROGRAM

## 2025-03-01 PROCEDURE — 770001 HCHG ROOM/CARE - MED/SURG/GYN PRIV*

## 2025-03-01 PROCEDURE — A9270 NON-COVERED ITEM OR SERVICE: HCPCS

## 2025-03-01 PROCEDURE — A9270 NON-COVERED ITEM OR SERVICE: HCPCS | Performed by: STUDENT IN AN ORGANIZED HEALTH CARE EDUCATION/TRAINING PROGRAM

## 2025-03-01 PROCEDURE — A9270 NON-COVERED ITEM OR SERVICE: HCPCS | Performed by: INTERNAL MEDICINE

## 2025-03-01 PROCEDURE — 700102 HCHG RX REV CODE 250 W/ 637 OVERRIDE(OP): Performed by: EMERGENCY MEDICINE

## 2025-03-01 PROCEDURE — 99231 SBSQ HOSP IP/OBS SF/LOW 25: CPT | Performed by: STUDENT IN AN ORGANIZED HEALTH CARE EDUCATION/TRAINING PROGRAM

## 2025-03-01 PROCEDURE — 700102 HCHG RX REV CODE 250 W/ 637 OVERRIDE(OP): Performed by: INTERNAL MEDICINE

## 2025-03-01 RX ORDER — GABAPENTIN 300 MG/1
600 CAPSULE ORAL 3 TIMES DAILY
Status: DISCONTINUED | OUTPATIENT
Start: 2025-03-01 | End: 2025-03-07 | Stop reason: HOSPADM

## 2025-03-01 RX ADMIN — METHOCARBAMOL TABLETS 750 MG: 750 TABLET, COATED ORAL at 16:04

## 2025-03-01 RX ADMIN — ENOXAPARIN SODIUM 40 MG: 100 INJECTION SUBCUTANEOUS at 16:04

## 2025-03-01 RX ADMIN — GABAPENTIN 600 MG: 300 CAPSULE ORAL at 16:04

## 2025-03-01 RX ADMIN — METHOCARBAMOL TABLETS 750 MG: 750 TABLET, COATED ORAL at 12:36

## 2025-03-01 RX ADMIN — METHYLPHENIDATE HYDROCHLORIDE 5 MG: 5 TABLET ORAL at 12:35

## 2025-03-01 RX ADMIN — METHOCARBAMOL TABLETS 750 MG: 750 TABLET, COATED ORAL at 08:37

## 2025-03-01 RX ADMIN — VENLAFAXINE HYDROCHLORIDE 225 MG: 75 CAPSULE, EXTENDED RELEASE ORAL at 09:16

## 2025-03-01 RX ADMIN — GABAPENTIN 600 MG: 300 CAPSULE ORAL at 00:27

## 2025-03-01 RX ADMIN — LIDOCAINE 2 PATCH: 4 PATCH TOPICAL at 11:45

## 2025-03-01 RX ADMIN — CALCIUM POLYCARBOPHIL 625 MG: 625 TABLET, FILM COATED ORAL at 16:04

## 2025-03-01 RX ADMIN — GABAPENTIN 600 MG: 300 CAPSULE ORAL at 11:46

## 2025-03-01 RX ADMIN — SITAGLIPTIN 100 MG: 100 TABLET, FILM COATED ORAL at 04:42

## 2025-03-01 RX ADMIN — OMEPRAZOLE 40 MG: 20 CAPSULE, DELAYED RELEASE ORAL at 04:42

## 2025-03-01 RX ADMIN — SENNOSIDES AND DOCUSATE SODIUM 2 TABLET: 50; 8.6 TABLET ORAL at 16:04

## 2025-03-01 RX ADMIN — PIOGLITAZONE 30 MG: 30 TABLET ORAL at 04:42

## 2025-03-01 RX ADMIN — GLIPIZIDE 10 MG: 5 TABLET ORAL at 08:37

## 2025-03-01 RX ADMIN — Medication 1000 UNITS: at 04:42

## 2025-03-01 RX ADMIN — METHOCARBAMOL TABLETS 750 MG: 750 TABLET, COATED ORAL at 20:26

## 2025-03-01 RX ADMIN — Medication 10 MG: at 20:26

## 2025-03-01 RX ADMIN — GABAPENTIN 600 MG: 300 CAPSULE ORAL at 04:42

## 2025-03-01 RX ADMIN — CALCIUM POLYCARBOPHIL 625 MG: 625 TABLET, FILM COATED ORAL at 12:35

## 2025-03-01 RX ADMIN — CALCIUM POLYCARBOPHIL 625 MG: 625 TABLET, FILM COATED ORAL at 09:16

## 2025-03-01 ASSESSMENT — PAIN DESCRIPTION - PAIN TYPE
TYPE: ACUTE PAIN
TYPE: ACUTE PAIN

## 2025-03-01 ASSESSMENT — ENCOUNTER SYMPTOMS
BACK PAIN: 1
WEAKNESS: 0
CONSTIPATION: 0
DIZZINESS: 0
LOSS OF CONSCIOUSNESS: 0
DIARRHEA: 1

## 2025-03-01 NOTE — DISCHARGE PLANNING
Alert Team/Behavioral Health   Note:      - [facility]: Talked to Charge RN (Monalisa). Re-sent referral. They will call back when they have an update.    - [facility]: Talked to Intake RN (Amanda). They are not doing admissions for the rest of day due to short staffing.    - [facility] : Talked to Lillie. Re-sent referral. No beds currently available.

## 2025-03-01 NOTE — PROGRESS NOTES
1647 Message sent to Dr Bustamante thru [facility] that patient voided 560 ml and TPVR bladder scan result is 635 ml.  New order received and acknowledged to straight cath once now.    1648 Message Dr Bustamante that patient refused to be straight cath and wants to void again and re check the Bladder scan.    1723 Message sen to Dr Bustamante thru [facility] that patient voided 200 ml and TPVR bladder scan result is 250 ml.

## 2025-03-01 NOTE — CARE PLAN
The patient is Watcher - Medium risk of patient condition declining or worsening    Shift Goals  Clinical Goals: safety, comfort, 1:1 sitter  Patient Goals: rest, pain control,NA  Family Goals: not present    Progress made toward(s) clinical / shift goals:    Problem: Pain - Standard  Goal: Alleviation of pain or a reduction in pain to the patient’s comfort goal  Outcome: Progressing  Note: Paitient educated on 0 to 10 pain scale. Pain managed with pharmacologic and non-pharmacological interventions. See MAR. Pt verbilized understanding of interventions.      Problem: Knowledge Deficit - Standard  Goal: Patient and family/care givers will demonstrate understanding of plan of care, disease process/condition, diagnostic tests and medications  Outcome: Progressing     Problem: Provide Safe Environment  Goal: Suicide environmental safety, protocols, policies, and practices will be implemented  Outcome: Progressing     Problem: Psychosocial  Goal: Patient's ability to identify and develop effective coping behaviors will improve  Outcome: Progressing  Goal: Patient's ability to identify and utilize available support systems will improve  Outcome: Progressing     Problem: Skin Integrity  Goal: Skin integrity is maintained or improved  Outcome: Progressing     Problem: Infection - Standard  Goal: Patient will remain free from infection  Outcome: Progressing     Problem: Mobility  Goal: Risk for activity intolerance will decrease  Outcome: Progressing  Note: Pt educated on early ambulation post surgery and use of call light for assistance. Pt was assessed for proper DME usage and amount of assistance. Pt verbalized understanding.     Problem: Safety  Goal: Will remain free from injury  Outcome: Progressing  Goal: Will remain free from falls  Outcome: Progressing       Patient is not progressing towards the following goals:

## 2025-03-01 NOTE — CARE PLAN
The patient is Stable - Low risk of patient condition declining or worsening    Shift Goals  Clinical Goals: safety, comfort  Patient Goals: rest, pain control  Family Goals: not present    Progress made toward(s) clinical / shift goals:    Problem: Pain - Standard  Goal: Alleviation of pain or a reduction in pain to the patient’s comfort goal  Outcome: Progressing     Problem: Knowledge Deficit - Standard  Goal: Patient and family/care givers will demonstrate understanding of plan of care, disease process/condition, diagnostic tests and medications  Outcome: Progressing     Problem: Provide Safe Environment  Goal: Suicide environmental safety, protocols, policies, and practices will be implemented  Outcome: Progressing     Problem: Psychosocial  Goal: Patient's ability to identify and develop effective coping behaviors will improve  Outcome: Progressing

## 2025-03-01 NOTE — PROGRESS NOTES
[facility] Medicine Daily Progress Note    Date of Service  3/1/2025    Chief Complaint  Christoph Taylor is a 59 y.o. male admitted 2/10/2025 with   Chief Complaint   Patient presents with    T-5000 FALL    Back Pain         Hospital Course  Patient is a 59-year-old male with extensive past medical history of poorly controlled type 2 diabetes mellitus, failure to thrive, and prior suicidal ideations secondary to major depressive disorder as well as his chronic back pain status post multiple laminectomies and fractures who presents due to recurrent falls as well as progressive worsening back pain. Patient was initially held in the emergency department due to suicidal ideation, did not meet any admission criteria, and had progressive worsening of his bilateral lower extremity. Patient reports he chronically has fecal incontinence for the past several months, no other new neurologic symptoms. However, patient does report new onset of acute urinary retention prompting ER provider to order stat MRI of thoracic and lumbar spine due to previous evidence of cord compression.     MRI of thoracic and lumbar showed chronic compression deformity at T12.  Postsurgical and degenerative changes.  No acute fracture.  No significant interval change.    Patient had persistent urinary retention requiring Crowley catheter placement.  Started on tamsulosin.    Patient has difficulty taking care of himself.  He has difficulty managing his diabetes.  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  Also states decreased range of motion and flexibility, dexterity of his bilateral hands which is previous cervical surgery was supposed to fix.  States he has a lot of trouble with glucometer, lancets, using insulin pens.  He was previously switched to oral hypoglycemic agents but still was not under good control and he would still like to be able to check his blood sugar because it dropped severely low previously while he  was at [place].  He apparently started having convulsions and they thought a cardiac arrest.  He also has poor vision making very difficult to do manage his meds and glucometer.  Previously saw optometry and diagnosed with diabetic retinopathy and cataracts.    Was placed on legal hold for suicidal ideations.    Interval Problem Update  No acute events overnight.  Patient feeling well, reporting chronic shoulder pain. Encouraged diclofenac gel use as he has not tried it yet.  Had formed mushy stool yesterday.  Monitor for orthostatic dizziness, blood pressure remains normotensive.  On legal hold. Psychiatry following.  Patient is medically cleared for transfer to inpatient psychiatric facility.      I have discussed this patient's plan of care and discharge plan at IDT rounds today with Case Management, Nursing, Nursing leadership, and other members of the IDT team.    Consultants/Specialty  psychiatry    Code Status  Full Code    Disposition  Medically Cleared  I have placed the appropriate orders for post-discharge needs.    Review of Systems  Review of Systems   Gastrointestinal:  Positive for diarrhea. Negative for constipation.   Musculoskeletal:  Positive for back pain and joint pain.   Neurological:  Negative for dizziness, loss of consciousness and weakness.        Physical Exam  Temp:  [36.5 °C (97.7 °F)-36.7 °C (98.1 °F)] 36.6 °C (97.9 °F)  Pulse:  [80-91] 89  Resp:  [16-18] 18  BP: (115-147)/(68-89) 136/81  SpO2:  [92 %-98 %] 92 %    Physical Exam  Vitals and nursing note reviewed. Exam conducted with a chaperone present (Sitter at bedside).   Constitutional:       General: He is not in acute distress.     Appearance: Normal appearance. He is not ill-appearing, toxic-appearing or diaphoretic.   HENT:      Head: Normocephalic.      Ears:      Comments: +Presbycusis     Nose: Nose normal.      Mouth/Throat:      Mouth: Mucous membranes are moist.   Eyes:      General: No scleral icterus.      Conjunctiva/sclera: Conjunctivae normal.   Pulmonary:      Effort: Pulmonary effort is normal. No respiratory distress.   Genitourinary:     Comments: No jiménez  Skin:     General: Skin is warm and dry.   Neurological:      Mental Status: He is alert.      Comments: Appropriately conversant, moves all extremities   Psychiatric:         Attention and Perception: Attention and perception normal.         Mood and Affect: Mood normal. Affect is blunt.         Speech: Speech normal.         Behavior: Behavior normal. Behavior is cooperative.         Thought Content: Thought content normal.         Cognition and Memory: Cognition and memory normal.         Judgment: Judgment normal.       Fluids    Intake/Output Summary (Last 24 hours) at 3/1/2025 0948  Last data filed at 3/1/2025 0600  Gross per 24 hour   Intake 360 ml   Output 1895 ml   Net -1535 ml      Laboratory                            Imaging  DX-SHOULDER 2+ RIGHT   Final Result      1.  No evidence of right shoulder fracture or dislocation.      2.  Moderate osteoarthritic changes of the right AC joint      [id number] WITH   Final Result      1.  Unremarkable orbits.   2.  No facial soft tissue swelling or mass.   3.  Paranasal sinuses are clear.      MR-THORACIC SPINE-W/O   Final Result      1.  Moderate chronic compression deformity at T12.   2.  No acute fracture.   3.  Multifocal degenerative disease as described above.   4.  There has been no significant interval change.      MR-LUMBAR SPINE-W/O   Final Result      1.  Postsurgical and degenerative changes as described above.   2.  Moderate chronic compression deformity at T12.   3.  No acute abnormality.   4.  There has been no significant interval change.      CT-LSPINE W/O PLUS RECONS   Final Result      1. No acute fracture or subluxation involving the lumbar spine.   2. Stable anterior wedge compression deformity of the superior endplate of T12.   3. Stable degenerative grade 1 anterolisthesis of  L4 with respect to L3.   4. New postsurgical changes of posterior lateral interbody fusion at L4-5.   5. Horseshoe kidney.   6. Circumferential bladder wall thickening.      CT-TSPINE W/O PLUS RECONS   Final Result      Chronic appearing fractures without a definite acute fracture or change in alignment.      CT-CSPINE WITHOUT PLUS RECONS   Final Result      1. No acute osseous injury of the cervical spine.   2. Stable postsurgical changes of ACDF from C4 through C7.   3. Stable multilevel cervical spondylosis.      CT-HEAD W/O   Final Result      1. Age-related central and cortical atrophy.   2. No acute intracranial abnormality.                    Assessment/Plan  * Urinary retention- (present on admission)  Assessment & Plan  Improved  Patient with acute urinary retention after fall, given extensive back history concern for potential cord abnormality.    Unable to tolerate A1AT due to orthostasis  If worsening retention but improved orthostasis, will discuss with Urology if inpatient TURP is feasible  Bladder scan prn, straight cath for PVR>400cc    Chronic midline back pain- (present on admission)  Assessment & Plan  MRI demonstrates chronic compression fracture and degenerative changes - nonoperative management  Opiates contraindicated due to urinary retention, chronic pain, and mood DO  Multimodal regimen:  Increased gabapentin to 600 mg TID  Scheduled NSAID - HELD DURING STEROIDS  SNRI - venlafaxine  Scheduled Muscle relaxant - robaxin QID  Lidoderm topical therapy  Warm compress  Psychiatry consultation for CBD  Refractory -  5-day course of steroids 2/20-2/24    Orthostatic hypotension  Assessment & Plan  Recurrent  Exacerbated by high-dose tamsulosin, not improved with doxazosin  Improving, have stopped BPH medications thankfully he is urinating well  Cosyntropin stimulation test negative 2/23 for AI  Orthostatic VS and IVF boluses as needed    Proliferative diabetic retinopathy of both eyes without  macular edema associated with type 2 diabetes mellitus (HCC)- (present on admission)  Assessment & Plan  Complains of worsening vision  Patient Previous saw Soraida Dianna, OD on 10/5/2024.  Exam is uploaded into the media tab.  At that time his right eye visual acuity was 20/40-2, left eye 20/150-1.  Referral was placed to [facility] for diabetic retinopathy and cataracts but patient has been unable to follow-up.  Patient complained of progressively worsening vision more acutely over the past few days.  He had a CT orbits on 2/16 which was unremarkable  Follow up with outpatient ophthalmologist    Frequent falls- (present on admission)  Assessment & Plan  Patient with frequent falls due to chronic and progressively worsening bilateral lower extremity weakness with multiple different spinal surgeries.  Patient does report chronic bowel incontinence and recently has developed urinary retention after previous fall with concern for potential cord compression/abnormality.  Lower extremity weakness 4+ out of 5, likely generalized, however asked by ER provider to admit for MRI evaluation  -MRI of thoracic and lumbar spine show chronic compression fracture at T12, degenerative changes.  No acute fracture  Neurosurgery has recommended for outpatient follow-up and possible epidural injections  - PT/OT recommend post-acute, not a candidate due to legal hold for SI    Suicidal ideation- (present on admission)  Assessment & Plan  Currently on legal hold for suicidal ideation due to his current health  Psychiatry consulted, extended legal hold  He has not contested, expect 1 week extension    Acute hypoxic respiratory failure (HCC)- (present on admission)  Assessment & Plan  Resolved    Uncontrolled type 2 diabetes mellitus with hyperglycemia (HCC)- (present on admission)  Assessment & Plan  A1c 9.8  Insulin discontinued due to difficulty managing with impaired dexterity / vision and risk of hypoglycemia  Initiate  additional pharmacotherapy (SGLT2, GLP1, etc) after discharge       VTE prophylaxis: Lovenox

## 2025-03-02 PROCEDURE — 700102 HCHG RX REV CODE 250 W/ 637 OVERRIDE(OP): Performed by: STUDENT IN AN ORGANIZED HEALTH CARE EDUCATION/TRAINING PROGRAM

## 2025-03-02 PROCEDURE — A9270 NON-COVERED ITEM OR SERVICE: HCPCS

## 2025-03-02 PROCEDURE — A9270 NON-COVERED ITEM OR SERVICE: HCPCS | Performed by: EMERGENCY MEDICINE

## 2025-03-02 PROCEDURE — 700111 HCHG RX REV CODE 636 W/ 250 OVERRIDE (IP): Mod: JZ

## 2025-03-02 PROCEDURE — 700102 HCHG RX REV CODE 250 W/ 637 OVERRIDE(OP)

## 2025-03-02 PROCEDURE — 700102 HCHG RX REV CODE 250 W/ 637 OVERRIDE(OP): Performed by: INTERNAL MEDICINE

## 2025-03-02 PROCEDURE — 99231 SBSQ HOSP IP/OBS SF/LOW 25: CPT | Performed by: STUDENT IN AN ORGANIZED HEALTH CARE EDUCATION/TRAINING PROGRAM

## 2025-03-02 PROCEDURE — 51798 US URINE CAPACITY MEASURE: CPT

## 2025-03-02 PROCEDURE — 90837 PSYTX W PT 60 MINUTES: CPT | Performed by: SOCIAL WORKER

## 2025-03-02 PROCEDURE — 770001 HCHG ROOM/CARE - MED/SURG/GYN PRIV*

## 2025-03-02 PROCEDURE — 700101 HCHG RX REV CODE 250: Performed by: STUDENT IN AN ORGANIZED HEALTH CARE EDUCATION/TRAINING PROGRAM

## 2025-03-02 PROCEDURE — A9270 NON-COVERED ITEM OR SERVICE: HCPCS | Performed by: STUDENT IN AN ORGANIZED HEALTH CARE EDUCATION/TRAINING PROGRAM

## 2025-03-02 PROCEDURE — A9270 NON-COVERED ITEM OR SERVICE: HCPCS | Performed by: INTERNAL MEDICINE

## 2025-03-02 PROCEDURE — 700102 HCHG RX REV CODE 250 W/ 637 OVERRIDE(OP): Performed by: EMERGENCY MEDICINE

## 2025-03-02 RX ADMIN — Medication 1000 UNITS: at 05:25

## 2025-03-02 RX ADMIN — SITAGLIPTIN 100 MG: 100 TABLET, FILM COATED ORAL at 05:25

## 2025-03-02 RX ADMIN — VENLAFAXINE HYDROCHLORIDE 225 MG: 75 CAPSULE, EXTENDED RELEASE ORAL at 07:49

## 2025-03-02 RX ADMIN — PIOGLITAZONE 30 MG: 30 TABLET ORAL at 05:25

## 2025-03-02 RX ADMIN — CALCIUM POLYCARBOPHIL 625 MG: 625 TABLET, FILM COATED ORAL at 13:42

## 2025-03-02 RX ADMIN — Medication 10 MG: at 20:39

## 2025-03-02 RX ADMIN — CALCIUM POLYCARBOPHIL 625 MG: 625 TABLET, FILM COATED ORAL at 07:50

## 2025-03-02 RX ADMIN — METHOCARBAMOL TABLETS 750 MG: 750 TABLET, COATED ORAL at 18:25

## 2025-03-02 RX ADMIN — METHYLPHENIDATE HYDROCHLORIDE 5 MG: 5 TABLET ORAL at 13:13

## 2025-03-02 RX ADMIN — METHOCARBAMOL TABLETS 750 MG: 750 TABLET, COATED ORAL at 13:13

## 2025-03-02 RX ADMIN — SENNOSIDES AND DOCUSATE SODIUM 2 TABLET: 50; 8.6 TABLET ORAL at 18:24

## 2025-03-02 RX ADMIN — LOPERAMIDE HYDROCHLORIDE 2 MG: 2 CAPSULE ORAL at 07:50

## 2025-03-02 RX ADMIN — GABAPENTIN 600 MG: 300 CAPSULE ORAL at 05:25

## 2025-03-02 RX ADMIN — GABAPENTIN 600 MG: 300 CAPSULE ORAL at 18:24

## 2025-03-02 RX ADMIN — METHOCARBAMOL TABLETS 750 MG: 750 TABLET, COATED ORAL at 20:39

## 2025-03-02 RX ADMIN — CALCIUM POLYCARBOPHIL 625 MG: 625 TABLET, FILM COATED ORAL at 18:24

## 2025-03-02 RX ADMIN — OMEPRAZOLE 40 MG: 20 CAPSULE, DELAYED RELEASE ORAL at 05:25

## 2025-03-02 RX ADMIN — GABAPENTIN 600 MG: 300 CAPSULE ORAL at 13:42

## 2025-03-02 RX ADMIN — GLIPIZIDE 10 MG: 5 TABLET ORAL at 07:50

## 2025-03-02 RX ADMIN — METHOCARBAMOL TABLETS 750 MG: 750 TABLET, COATED ORAL at 08:18

## 2025-03-02 RX ADMIN — DICLOFENAC SODIUM 2 G: 10 GEL TOPICAL at 20:39

## 2025-03-02 RX ADMIN — METHYLPHENIDATE HYDROCHLORIDE 5 MG: 5 TABLET ORAL at 08:18

## 2025-03-02 RX ADMIN — ENOXAPARIN SODIUM 40 MG: 100 INJECTION SUBCUTANEOUS at 18:25

## 2025-03-02 RX ADMIN — LIDOCAINE 2 PATCH: 4 PATCH TOPICAL at 13:13

## 2025-03-02 ASSESSMENT — ENCOUNTER SYMPTOMS
BACK PAIN: 1
DIARRHEA: 1
WEAKNESS: 0
DIZZINESS: 0
LOSS OF CONSCIOUSNESS: 0
CONSTIPATION: 0

## 2025-03-02 ASSESSMENT — PAIN DESCRIPTION - PAIN TYPE
TYPE: ACUTE PAIN
TYPE: ACUTE PAIN

## 2025-03-02 NOTE — PROGRESS NOTES
Patient A&OX4. Patient stated pain. Meds administered per MAR.  Water and call light within reach.  Patient on legal hold for SI.  Sitter at the bedside.  0132 bladder scanned. 0ml  showed.

## 2025-03-02 NOTE — DISCHARGE PLANNING
Alert Team/Behavioral Health   Note:      - [facility] : Talked to Lillie. Referral is pending review. No beds currently available.    - [facility]: Talked to Charge RN (Cleopatra). Referral is pending review. They will call back when they have an update.  @1055: Charge RN (Cleopatra) called to request updated provider notes regarding diarrhea and jiménez catheter. Sent as requested.    - [facility]: Talked to Mandi. Referral is pending review. They will call back when they have an update.

## 2025-03-02 NOTE — PROGRESS NOTES
[facility] Medicine Daily Progress Note    Date of Service  3/2/2025    Chief Complaint  Christoph Taylor is a 59 y.o. male admitted 2/10/2025 with   Chief Complaint   Patient presents with    T-5000 FALL    Back Pain         Hospital Course  Patient is a 59-year-old male with extensive past medical history of poorly controlled type 2 diabetes mellitus, failure to thrive, and prior suicidal ideations secondary to major depressive disorder as well as his chronic back pain status post multiple laminectomies and fractures who presents due to recurrent falls as well as progressive worsening back pain. Patient was initially held in the emergency department due to suicidal ideation, did not meet any admission criteria, and had progressive worsening of his bilateral lower extremity. Patient reports he chronically has fecal incontinence for the past several months, no other new neurologic symptoms. However, patient does report new onset of acute urinary retention prompting ER provider to order stat MRI of thoracic and lumbar spine due to previous evidence of cord compression.     MRI of thoracic and lumbar showed chronic compression deformity at T12.  Postsurgical and degenerative changes.  No acute fracture.  No significant interval change.    Patient had persistent urinary retention requiring Crowley catheter placement.  Started on tamsulosin.    Patient has difficulty taking care of himself.  He has difficulty managing his diabetes.  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  Also states decreased range of motion and flexibility, dexterity of his bilateral hands which is previous cervical surgery was supposed to fix.  States he has a lot of trouble with glucometer, lancets, using insulin pens.  He was previously switched to oral hypoglycemic agents but still was not under good control and he would still like to be able to check his blood sugar because it dropped severely low previously while he  was at [place].  He apparently started having convulsions and they thought a cardiac arrest.  He also has poor vision making very difficult to do manage his meds and glucometer.  Previously saw optometry and diagnosed with diabetic retinopathy and cataracts.    Was placed on legal hold for suicidal ideations.    Interval Problem Update  No acute events overnight.  Patient urinating well, post void residuals have been good <400cc.  Monitor for orthostatic dizziness, blood pressure remains normotensive.  On legal hold. Psychiatry following.  Patient is medically cleared for transfer to inpatient psychiatric facility.      I have discussed this patient's plan of care and discharge plan at IDT rounds today with Case Management, Nursing, Nursing leadership, and other members of the IDT team.    Consultants/Specialty  psychiatry    Code Status  Full Code    Disposition  Medically Cleared  I have placed the appropriate orders for post-discharge needs.    Review of Systems  Review of Systems   Gastrointestinal:  Positive for diarrhea. Negative for constipation.   Musculoskeletal:  Positive for back pain and joint pain.   Neurological:  Negative for dizziness, loss of consciousness and weakness.        Physical Exam  Temp:  [36.2 °C (97.2 °F)-36.9 °C (98.4 °F)] 36.2 °C (97.2 °F)  Pulse:  [75-92] 80  Resp:  [16-18] 16  BP: (103-148)/(64-84) 148/84  SpO2:  [92 %-98 %] 97 %    Physical Exam  Vitals and nursing note reviewed. Exam conducted with a chaperone present (Sitter at bedside).   Constitutional:       General: He is not in acute distress.     Appearance: Normal appearance. He is not ill-appearing, toxic-appearing or diaphoretic.   HENT:      Head: Normocephalic.      Ears:      Comments: +Presbycusis     Nose: Nose normal.      Mouth/Throat:      Mouth: Mucous membranes are moist.   Eyes:      General: No scleral icterus.     Conjunctiva/sclera: Conjunctivae normal.   Pulmonary:      Effort: Pulmonary effort is normal.  No respiratory distress.   Genitourinary:     Comments: No jiménez  Skin:     General: Skin is warm and dry.   Neurological:      Mental Status: He is alert.      Comments: Appropriately conversant, moves all extremities   Psychiatric:         Attention and Perception: Attention and perception normal.         Mood and Affect: Mood normal. Affect is blunt.         Speech: Speech normal.         Behavior: Behavior normal. Behavior is cooperative.         Thought Content: Thought content normal.         Cognition and Memory: Cognition and memory normal.         Judgment: Judgment normal.       Fluids    Intake/Output Summary (Last 24 hours) at 3/2/2025 1019  Last data filed at 3/2/2025 0558  Gross per 24 hour   Intake --   Output 1850 ml   Net -1850 ml      Laboratory                            Imaging  DX-SHOULDER 2+ RIGHT   Final Result      1.  No evidence of right shoulder fracture or dislocation.      2.  Moderate osteoarthritic changes of the right AC joint      [id number] WITH   Final Result      1.  Unremarkable orbits.   2.  No facial soft tissue swelling or mass.   3.  Paranasal sinuses are clear.      MR-THORACIC SPINE-W/O   Final Result      1.  Moderate chronic compression deformity at T12.   2.  No acute fracture.   3.  Multifocal degenerative disease as described above.   4.  There has been no significant interval change.      MR-LUMBAR SPINE-W/O   Final Result      1.  Postsurgical and degenerative changes as described above.   2.  Moderate chronic compression deformity at T12.   3.  No acute abnormality.   4.  There has been no significant interval change.      CT-LSPINE W/O PLUS RECONS   Final Result      1. No acute fracture or subluxation involving the lumbar spine.   2. Stable anterior wedge compression deformity of the superior endplate of T12.   3. Stable degenerative grade 1 anterolisthesis of L4 with respect to L3.   4. New postsurgical changes of posterior lateral interbody fusion at L4-5.    5. Horseshoe kidney.   6. Circumferential bladder wall thickening.      CT-TSPINE W/O PLUS RECONS   Final Result      Chronic appearing fractures without a definite acute fracture or change in alignment.      CT-CSPINE WITHOUT PLUS RECONS   Final Result      1. No acute osseous injury of the cervical spine.   2. Stable postsurgical changes of ACDF from C4 through C7.   3. Stable multilevel cervical spondylosis.      CT-HEAD W/O   Final Result      1. Age-related central and cortical atrophy.   2. No acute intracranial abnormality.                    Assessment/Plan  * Urinary retention- (present on admission)  Assessment & Plan  Improved  Patient with acute urinary retention after fall, given extensive back history concern for potential cord abnormality.    Unable to tolerate A1AT due to orthostasis  If worsening retention but improved orthostasis, will discuss with Urology if inpatient TURP is feasible  Bladder scan prn, straight cath for PVR>400cc    Chronic midline back pain- (present on admission)  Assessment & Plan  MRI demonstrates chronic compression fracture and degenerative changes - nonoperative management  Opiates contraindicated due to urinary retention, chronic pain, and mood DO  Multimodal regimen:  Increased gabapentin to 600 mg TID  Scheduled NSAID - HELD DURING STEROIDS  SNRI - venlafaxine  Scheduled Muscle relaxant - robaxin QID  Lidoderm topical therapy  Warm compress  Psychiatry consultation for CBD  Refractory -  5-day course of steroids 2/20-2/24    Orthostatic hypotension  Assessment & Plan  Recurrent  Exacerbated by high-dose tamsulosin, not improved with doxazosin  Improving, have stopped BPH medications thankfully he is urinating well  Cosyntropin stimulation test negative 2/23 for AI  Orthostatic VS and IVF boluses as needed    Proliferative diabetic retinopathy of both eyes without macular edema associated with type 2 diabetes mellitus (HCC)- (present on admission)  Assessment &  Plan  Complains of worsening vision  Patient Previous saw Soraida Bustamante, OD on 10/5/2024.  Exam is uploaded into the media tab.  At that time his right eye visual acuity was 20/40-2, left eye 20/150-1.  Referral was placed to [facility] for diabetic retinopathy and cataracts but patient has been unable to follow-up.  Patient complained of progressively worsening vision more acutely over the past few days.  He had a CT orbits on 2/16 which was unremarkable  Follow up with outpatient ophthalmologist    Frequent falls- (present on admission)  Assessment & Plan  Patient with frequent falls due to chronic and progressively worsening bilateral lower extremity weakness with multiple different spinal surgeries.  Patient does report chronic bowel incontinence and recently has developed urinary retention after previous fall with concern for potential cord compression/abnormality.  Lower extremity weakness 4+ out of 5, likely generalized, however asked by ER provider to admit for MRI evaluation  -MRI of thoracic and lumbar spine show chronic compression fracture at T12, degenerative changes.  No acute fracture  Neurosurgery has recommended for outpatient follow-up and possible epidural injections  - PT/OT recommend post-acute, not a candidate due to legal hold for SI    Suicidal ideation- (present on admission)  Assessment & Plan  Currently on legal hold for suicidal ideation due to his current health  Psychiatry consulted, extended legal hold  He has not contested, expect 1 week extension    Acute hypoxic respiratory failure (HCC)- (present on admission)  Assessment & Plan  Resolved    Uncontrolled type 2 diabetes mellitus with hyperglycemia (HCC)- (present on admission)  Assessment & Plan  A1c 9.8  Insulin discontinued due to difficulty managing with impaired dexterity / vision and risk of hypoglycemia  Initiate additional pharmacotherapy (SGLT2, GLP1, etc) after discharge       VTE prophylaxis: Lovenox

## 2025-03-02 NOTE — CARE PLAN
The patient is Stable - Low risk of patient condition declining or worsening    Shift Goals  Clinical Goals: safety, comfofrt, 1:1 sitter  Patient Goals: rest, pain control  Family Goals: NA    Progress made toward(s) clinical / shift goals:  yes  Problem: Pain - Standard  Goal: Alleviation of pain or a reduction in pain to the patient’s comfort goal  Outcome: Progressing     Problem: Mobility  Goal: Risk for activity intolerance will decrease  Outcome: Progressing     Problem: Safety  Goal: Will remain free from injury  Outcome: Progressing  Goal: Will remain free from falls  Outcome: Progressing       Patient is not progressing towards the following goals:

## 2025-03-02 NOTE — DISCHARGE PLANNING
Case Management Discharge Planning    Admission Date: 2/10/2025  GMLOS: 2.3  ALOS: 19    6-Clicks ADL Score: 22  6-Clicks Mobility Score: 17  PT and/or OT Eval ordered: Yes  Post-acute Referrals Ordered: Yes  Post-acute Choice Obtained: Yes  Has referral(s) been sent to post-acute provider:  Yes      Anticipated Discharge Dispo: Discharge Disposition: Discharged to home/self care (01)    DME Needed: No    Action(s) Taken: Updated Provider/Nurse on Discharge Plan    Legal hold was discontinued by Psych today, Psych notes in.  Informed Mindi of Alert Team of this update via voalte.  Informed Dr Bustamante via voalte.    PT and OT recommends post acute placement .  Per chart, Pt is pending SNF acceptance.     Escalations Completed: None    Medically Clear: No    Next Steps:   CM to continue to assist Pt with discharge as needed    Barriers to Discharge:   Medical clearance  Pending placement    Is the patient up for discharge tomorrow: No

## 2025-03-02 NOTE — CONSULTS
"Brief Behavioral Health Care Note        \"Patient is a 59-year-old male with extensive past medical history of poorly controlled type 2 diabetes mellitus, failure to thrive, and prior suicidal ideations secondary to major depressive disorder as well as his chronic back pain status post multiple laminectomies and fractures who presents due to recurrent falls as well as progressive worsening back pain. Patient was initially held in the emergency department due to suicidal ideation, did not meet any admission criteria, and had progressive worsening of his bilateral lower extremity. Patient reports he chronically has fecal incontinence for the past several months, no other new neurologic symptoms. However, patient does report new onset of acute urinary retention prompting ER provider to order stat MRI of thoracic and lumbar spine due to previous evidence of cord compression.    MRI of thoracic and lumbar showed chronic compression deformity at T12.  Postsurgical and degenerative changes.  No acute fracture.  No significant interval change.   Patient had persistent urinary retention requiring Crowley catheter placement.  Started on tamsulosin.   Patient has difficulty taking care of himself.  He has difficulty managing his diabetes.  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  Also states decreased range of motion and flexibility, dexterity of his bilateral hands which is previous cervical surgery was supposed to fix.  States he has a lot of trouble with glucometer, lancets, using insulin pens.  He was previously switched to oral hypoglycemic agents but still was not under good control and he would still like to be able to check his blood sugar because it dropped severely low previously while he was at [facility].  He apparently started having convulsions and they thought a cardiac arrest.  He also has poor vision making very difficult to do manage his meds and glucometer.  Previously saw optometry " "and diagnosed with diabetic retinopathy and cataracts.   Was placed on legal hold for suicidal ideations.\" Patient was [facility] for psychotherapy due to Legal Hold and suicidal ideations.       Psychotherapy Session Summary  Patient was seen sitting up in bed, alert, oriented, speech clear and coherent. Patient denies suicidal or homicidal ideations. No sign of a thought disorder. Patient was pleasant and cooperative. Clinician had to speak in an elevated and loud voice due to patient reported hearing loss. Patient was observed with the nursing call light sitting on his shoulder so he could hear the television. Patient also reports decreased ability to see clearly.    Patient states he has agreed to the safety plan completed two days ago on 2/28/25 with my student stating, \"I am not going to kill myself, I have been working to get my life in order, I ordered my cell phone, activated by [employer] card, I have a meeting today with the realtor to discuss an offer on my house, and I will call my friend today to see if I can rent a room from him and his wife, maybe I can take the dog with me there\". Patient continued, \"I know I need help, so I would have to get people to come help me in the house, but I realize I should not live alone\".      Patient reports he is also open to going to a SNF but states, \"so far I have not been accepted\". Patient states he understands the dog may not be able to go with him, however if would like to try to have the dog designated as a service dog due to the criteria for ADA that chronic depression would be a justification for a service dog. Patient states, \"this dog has helped me come out of many bad moods, he sees me sad and goes to get a toy and brings it to me, trying to engage me in playing with him.\" Patient continues, \"he follows me around the house all the time\". Patient continued to be tearful as he reflected on the meaningful exchanges he has had with the dog. " Clinician discussed with patient the importance of focusing on his reasons for living, even if the dog who is 13 years old, no longer lives or he is not able to take the dog with him. Patient reports he understands this and is preparing himself even if he has to put the dog down.    Patient reports his sons are not in a position to take the dog on his behalf. Patient reports he has spoken with two of his three sons lately and has refrained from yelling or losing his temper. Patient reports, it would be nice to live near there so I am not so isolated from family. Patient is motivated to build bridges with loved ones which is positive.  Patient appears to be future focused and no longer in imminent risk of harm. Patients insight and judgment has improved significantly. At this time the legal hold will be discontinued.    Recommendations  Patient is unable to care for self due to limited mobility, limited hand movement and limited ability to manage ADL's independently. Patient cannot discharge home or to a shelter. Patient would benefit from a discharge plan that provides care for the above mentioned items.    Legal hold: discontinued    Will continue to provide support and encouragement as needed.    Consulted with physician and nursing.    Thank you,    Farzana Tripathi, Ph.D., [facility]  Length of intervention: 60 minutes

## 2025-03-03 PROCEDURE — 700102 HCHG RX REV CODE 250 W/ 637 OVERRIDE(OP): Performed by: STUDENT IN AN ORGANIZED HEALTH CARE EDUCATION/TRAINING PROGRAM

## 2025-03-03 PROCEDURE — A9270 NON-COVERED ITEM OR SERVICE: HCPCS | Performed by: EMERGENCY MEDICINE

## 2025-03-03 PROCEDURE — 700102 HCHG RX REV CODE 250 W/ 637 OVERRIDE(OP)

## 2025-03-03 PROCEDURE — 700102 HCHG RX REV CODE 250 W/ 637 OVERRIDE(OP): Performed by: INTERNAL MEDICINE

## 2025-03-03 PROCEDURE — A9270 NON-COVERED ITEM OR SERVICE: HCPCS

## 2025-03-03 PROCEDURE — 99231 SBSQ HOSP IP/OBS SF/LOW 25: CPT | Performed by: STUDENT IN AN ORGANIZED HEALTH CARE EDUCATION/TRAINING PROGRAM

## 2025-03-03 PROCEDURE — 97530 THERAPEUTIC ACTIVITIES: CPT | Mod: CQ

## 2025-03-03 PROCEDURE — A9270 NON-COVERED ITEM OR SERVICE: HCPCS | Performed by: STUDENT IN AN ORGANIZED HEALTH CARE EDUCATION/TRAINING PROGRAM

## 2025-03-03 PROCEDURE — 51798 US URINE CAPACITY MEASURE: CPT

## 2025-03-03 PROCEDURE — 700102 HCHG RX REV CODE 250 W/ 637 OVERRIDE(OP): Performed by: EMERGENCY MEDICINE

## 2025-03-03 PROCEDURE — 770001 HCHG ROOM/CARE - MED/SURG/GYN PRIV*

## 2025-03-03 PROCEDURE — 700111 HCHG RX REV CODE 636 W/ 250 OVERRIDE (IP): Mod: JZ

## 2025-03-03 PROCEDURE — 97116 GAIT TRAINING THERAPY: CPT | Mod: CQ

## 2025-03-03 PROCEDURE — A9270 NON-COVERED ITEM OR SERVICE: HCPCS | Performed by: INTERNAL MEDICINE

## 2025-03-03 RX ORDER — MIDODRINE HYDROCHLORIDE 5 MG/1
5 TABLET ORAL
Status: DISCONTINUED | OUTPATIENT
Start: 2025-03-03 | End: 2025-03-07 | Stop reason: HOSPADM

## 2025-03-03 RX ADMIN — DICLOFENAC SODIUM 2 G: 10 GEL TOPICAL at 23:11

## 2025-03-03 RX ADMIN — Medication 1000 UNITS: at 05:48

## 2025-03-03 RX ADMIN — ENOXAPARIN SODIUM 40 MG: 100 INJECTION SUBCUTANEOUS at 16:47

## 2025-03-03 RX ADMIN — DICLOFENAC SODIUM 2 G: 10 GEL TOPICAL at 05:53

## 2025-03-03 RX ADMIN — Medication 10 MG: at 21:27

## 2025-03-03 RX ADMIN — METHYLPHENIDATE HYDROCHLORIDE 5 MG: 5 TABLET ORAL at 12:25

## 2025-03-03 RX ADMIN — OMEPRAZOLE 40 MG: 20 CAPSULE, DELAYED RELEASE ORAL at 05:48

## 2025-03-03 RX ADMIN — MIDODRINE HYDROCHLORIDE 5 MG: 5 TABLET ORAL at 16:47

## 2025-03-03 RX ADMIN — GABAPENTIN 600 MG: 300 CAPSULE ORAL at 16:46

## 2025-03-03 RX ADMIN — GABAPENTIN 600 MG: 300 CAPSULE ORAL at 12:25

## 2025-03-03 RX ADMIN — CALCIUM POLYCARBOPHIL 625 MG: 625 TABLET, FILM COATED ORAL at 16:46

## 2025-03-03 RX ADMIN — METHYLPHENIDATE HYDROCHLORIDE 5 MG: 5 TABLET ORAL at 10:18

## 2025-03-03 RX ADMIN — SENNOSIDES AND DOCUSATE SODIUM 2 TABLET: 50; 8.6 TABLET ORAL at 16:47

## 2025-03-03 RX ADMIN — MIDODRINE HYDROCHLORIDE 5 MG: 5 TABLET ORAL at 12:25

## 2025-03-03 RX ADMIN — METHOCARBAMOL TABLETS 750 MG: 750 TABLET, COATED ORAL at 09:06

## 2025-03-03 RX ADMIN — VENLAFAXINE HYDROCHLORIDE 225 MG: 75 CAPSULE, EXTENDED RELEASE ORAL at 09:06

## 2025-03-03 RX ADMIN — SITAGLIPTIN 100 MG: 100 TABLET, FILM COATED ORAL at 05:48

## 2025-03-03 RX ADMIN — PIOGLITAZONE 30 MG: 30 TABLET ORAL at 05:48

## 2025-03-03 RX ADMIN — METHOCARBAMOL TABLETS 750 MG: 750 TABLET, COATED ORAL at 21:27

## 2025-03-03 RX ADMIN — METHOCARBAMOL TABLETS 750 MG: 750 TABLET, COATED ORAL at 12:25

## 2025-03-03 RX ADMIN — CALCIUM POLYCARBOPHIL 625 MG: 625 TABLET, FILM COATED ORAL at 12:26

## 2025-03-03 RX ADMIN — GLIPIZIDE 10 MG: 5 TABLET ORAL at 09:06

## 2025-03-03 RX ADMIN — CALCIUM POLYCARBOPHIL 625 MG: 625 TABLET, FILM COATED ORAL at 09:06

## 2025-03-03 RX ADMIN — METHOCARBAMOL TABLETS 750 MG: 750 TABLET, COATED ORAL at 16:47

## 2025-03-03 RX ADMIN — GABAPENTIN 600 MG: 300 CAPSULE ORAL at 05:48

## 2025-03-03 ASSESSMENT — PAIN DESCRIPTION - PAIN TYPE
TYPE: ACUTE PAIN
TYPE: ACUTE PAIN

## 2025-03-03 ASSESSMENT — ENCOUNTER SYMPTOMS
LOSS OF CONSCIOUSNESS: 0
DIZZINESS: 0
CONSTIPATION: 0
WEAKNESS: 0
DIARRHEA: 1
BACK PAIN: 1

## 2025-03-03 ASSESSMENT — COGNITIVE AND FUNCTIONAL STATUS - GENERAL
CLIMB 3 TO 5 STEPS WITH RAILING: A LOT
TURNING FROM BACK TO SIDE WHILE IN FLAT BAD: A LITTLE
STANDING UP FROM CHAIR USING ARMS: A LITTLE
MOVING FROM LYING ON BACK TO SITTING ON SIDE OF FLAT BED: A LITTLE
WALKING IN HOSPITAL ROOM: A LITTLE
MOBILITY SCORE: 17
MOVING TO AND FROM BED TO CHAIR: A LITTLE
SUGGESTED CMS G CODE MODIFIER MOBILITY: CK

## 2025-03-03 ASSESSMENT — GAIT ASSESSMENTS
DISTANCE (FEET): 25
ASSISTIVE DEVICE: FRONT WHEEL WALKER
GAIT LEVEL OF ASSIST: CONTACT GUARD ASSIST

## 2025-03-03 NOTE — CARE PLAN
The patient is Stable - Low risk of patient condition declining or worsening    Shift Goals  Clinical Goals: safety, comfort  Patient Goals: comfort  Family Goals: not present    Progress made toward(s) clinical / shift goals:    Problem: Safety  Goal: Will remain free from falls  Outcome: Progressing  Note: Safety precautions are in place including bed locked and in lowest position, upper bed rails up, bed alarm on, call light within reach, treaded socks on, tray table and personal belongings within reach.       Patient is not progressing towards the following goals:

## 2025-03-03 NOTE — DISCHARGE PLANNING
1047  Per LSW request  Agency/Facility Name: Local [facility]  Sent Referral per at:  1047 am

## 2025-03-03 NOTE — DISCHARGE PLANNING
Case Management Discharge Planning    Admission Date: 2/10/2025  GMLOS: 2.3  ALOS: 20    6-Clicks ADL Score: 22  6-Clicks Mobility Score: 17  PT and/or OT Eval ordered: Yes  Post-acute Referrals Ordered: Yes  Post-acute Choice Obtained: Yes  Has referral(s) been sent to post-acute provider:  Yes      Anticipated Discharge Dispo: Discharge Disposition: Discharged to home/self care (01)    DME Needed: No    Action(s) Taken: Updated Provider/Nurse on Discharge Plan    Escalations Completed: Long Length of Stay Committee     Medically Clear: Yes    Next Steps: follow up with PFA for acceptance     Barriers to Discharge: Pending Placement    Is the patient up for discharge tomorrow: No  Patient's legal hold was discontinued on 3/2/25  MYRON faxed Choice to [facility]  Requested referral be sent to [facility] also    SW spoke to patient about his discharge plan.  SW addressed patient's plan on renting a room from a friend and his wife.  SW asked patient if he has discussed this with his friend yet.  Patient stated he was planning on call him today to finalize everything.    Patient then told SW that his trailer is being sold and it will finalize within the month.  Patient is not happy with what he go, but also realizes he couldn't get much more.      SW told patient that referrals for skilled facilities have been sent.  If patient was able to have a solid discharge to his friends home.  It would be easier to find placement for him.      SW also stated that skilled do not allow pets full time, but some allow pet visitation.  That if accepted locally SW would follow up on their policy.  Patient stated he would like that very much.

## 2025-03-03 NOTE — PROGRESS NOTES
Patient A&OX4. Meds administered per MAR. Applied Voltaren as patient requested.  VCC on.  Water and call light within reach.

## 2025-03-03 NOTE — THERAPY
Physical Therapy   Daily Treatment     Patient Name: Christoph Taylor  Age:  59 y.o., Sex:  male  Medical Record #: [id number]  Today's Date: 3/3/2025     Precautions  Precautions: (P) Fall Risk  Comments: (P) Lft LE AFO    Assessment    The pt c/o increase in difficulty hearing, new from the last time this therapist worked w/the pt. The pt's overall mobility remains limited by orthostatic hypotension, functionally he is supervision<->CGA w/bed mobility, STS/transfers w/FWW, and amb w/FWW. The pt was able to managed 25' x 2 w/FWW, sit down rest needed for dizziness.   MD notified of pts BP response during PT session.   PT will cont to follow.     Plan    Treatment Plan Status: (P) Continue Current Treatment Plan  Type of Treatment: Gait Training, Neuro Re-Education / Balance, Self Care / Home Evaluation, Stair Training, Therapeutic Activities, Therapeutic Exercise  Treatment Frequency: 3 Times per Week  Treatment Duration: Until Therapy Goals Met    DC Equipment Recommendations: (P) Unable to determine at this time  Discharge Recommendations: (P) Recommend post-acute placement for additional physical therapy services prior to discharge home     Objective       03/03/25 1036   Time In/Time Out   Therapy Start Time 0954   Therapy End Time 1036   Total Therapy Time 42   Charge Group   PT Gait Training (Units) 1   PT Therapeutic Activities (Units) 2   Total Time Spent   PT Gait Training Time Spent (Mins) 10   PT Therapeutic Activities Time Spent (Mins) 32   PT Total Time Spent (Calculated) 42   Precautions   Precautions Fall Risk   Comments Lft LE AFO   Pain 0 - 10 Group   Location Shoulder   Location Orientation Right   Pain Rating Scale (NPRS) 3   Therapist Pain Assessment Nurse Notified;Post Activity Pain Same as Prior to Activity   Other Treatments   Other Treatments Provided Orthostatics taken during PT session: Supine 123/86 HR 90, EOB 97/67 HR 87, Standing 89/51 HR 86. After seated rest 110/67 HR 87, Post  ambulation 91/67 HR 86. Pt left seated in chair post PT session.   Balance   Sitting Balance (Static) Good   Sitting Balance (Dynamic) Fair +   Standing Balance (Static) Fair   Standing Balance (Dynamic) Fair -   Weight Shift Sitting Good   Weight Shift Standing Fair   Skilled Intervention Verbal Cuing   Comments standing w/FWW   Bed Mobility    Supine to Sit Supervised  (HOB flat and use of railing)   Sit to Supine   (pt left seated in chair)   Scooting Supervised   Rolling Supervised   Skilled Intervention Verbal Cuing   Gait Analysis   Gait Level Of Assist Contact Guard Assist   Assistive Device Front Wheel Walker   Distance (Feet) 25   # of Times Distance was Traveled 2   Skilled Intervention Verbal Cuing   Comments Sit down rest d/t to c/o dizziness w/initiation of ambulation. Distances w/amb remain limited by symptomatic drop in BP.   Functional Mobility   Sit to Stand Supervised   Bed, Chair, Wheelchair Transfer Standby Assist  (bed->chair w/FWW)   Skilled Intervention Verbal Cuing   6 Clicks Assessment - How much HELP from from another person do you currently need... (If the patient hasn't done an activity recently, how much help from another person do you think he/she would need if he/she tried?)   Turning from your back to your side while in a flat bed without using bedrails? 3   Moving from lying on your back to sitting on the side of a flat bed without using bedrails? 3   Moving to and from a bed to a chair (including a wheelchair)? 3   Standing up from a chair using your arms (e.g., wheelchair, or bedside chair)? 3   Walking in hospital room? 3   Climbing 3-5 steps with a railing? 2   6 clicks Mobility Score 17   Short Term Goals    Short Term Goal # 1 Pt will be able to perform STS with FWW and SPV in 6 visits to progress functional transfers   Goal Outcome # 1 Goal met   Short Term Goal # 2 Pt will be able to perform stand step transfer with FWW and SPV in 6 visits to progress functional mobility    Goal Outcome # 2 Goal not met   Short Term Goal # 3 Pt will be able to ambulate 15ft with FWW and SPV in 6 visits to progress functional ambulation   Goal Outcome # 3 Goal not met   Short Term Goal # 4 Pt will be able to self propel 150ft in WC in 6 visits to progress functional mobility   Goal Outcome # 4 Goal not met   Short Term Goal # 5 Pt will be able to ascend/descend 5 stairs with SPV in 6 visits in order to safely navigate his home   Goal Outcome # 5 Goal not met   Education Group   Role of Physical Therapist Patient Response Patient;Acceptance;Explanation;Action Demonstration   Physical Therapy Treatment Plan   Physical Therapy Treatment Plan Continue Current Treatment Plan   Anticipated Discharge Equipment and Recommendations   DC Equipment Recommendations Unable to determine at this time   Discharge Recommendations Recommend post-acute placement for additional physical therapy services prior to discharge home   Interdisciplinary Plan of Care Collaboration   IDT Collaboration with  Physician;Nursing   Patient Position at End of Therapy Seated;Chair Alarm On;Call Light within Reach;Tray Table within Reach;Phone within Reach   Collaboration Comments Nrsg notified of pts tx efforts and MD notified of pts BP response.   Session Information   Date / Session Number  3/3--4 (1/3, 3/5) PTA/3   Supervising Physical Therapist (PTA Treatments Only)   Supervising Physical Therapist Vanessa Scott

## 2025-03-03 NOTE — CARE PLAN
The patient is Stable - Low risk of patient condition declining or worsening    Shift Goals  Clinical Goals: safety, comfort  Patient Goals: comfort  Family Goals: NA    Progress made toward(s) clinical / shift goals:  yes  Problem: Pain - Standard  Goal: Alleviation of pain or a reduction in pain to the patient’s comfort goal  Outcome: Progressing     Problem: Mobility  Goal: Risk for activity intolerance will decrease  Outcome: Progressing     Problem: Safety  Goal: Will remain free from injury  Outcome: Progressing  Goal: Will remain free from falls  Outcome: Progressing       Patient is not progressing towards the following goals:

## 2025-03-03 NOTE — PROGRESS NOTES
Hospital Medicine Daily Progress Note    Date of Service  3/3/2025    Chief Complaint  Christoph Taylor is a 59 y.o. male admitted 2/10/2025 with   Chief Complaint   Patient presents with    T-5000 FALL    Back Pain         Hospital Course  Patient is a 59-year-old male with extensive past medical history of poorly controlled type 2 diabetes mellitus, failure to thrive, and prior suicidal ideations secondary to major depressive disorder as well as chronic back pain status post multiple laminectomies and fractures who presents with acute on chronic back pain and failure to thrive.     Patient with recent hospitalization 1/9/25-2/7/25 for similar complaints. He was treated at that time for back pain, ultimately MRI spine findings stable and neurosurgery recommended non operative management. He was treated for pneumonia during that hospitalization as well. Ultimately he was declined by SNFs and discharged to home with home health and in home caregiver. He returned few days later for this hospitalization 2/10/25 for acute on chronic back pain, failure to thrive. He was also placed on legal hold due to suicidal ideation. Repeat MRI spine done which shows stable findings from prior. Due to urinary retention jiménez catheter placed, which was ultimately weaned off. He was started on tamsulosin and subsequently doxazosin but these were stopped due to orthostatic hypotension. Patients legal hold ultimately discontinued 3/2/25 by psychiatry team. He is on ritalin and effexor with good effect.  will send out SNF referrals again, if unable to find accepting SNF, patient will need some form of long term placement due to failure to thrive at home as previously demonstrated. Patient is medically cleared.    Interval Problem Update  No acute events overnight.  Legal hold discontinued yesterday by psychiatry team.  Patient feels tired but otherwise well.  He is urinating without retention, having mushy formed  stools.  Will have PT/OT reevaluate him for anticipated SNF needs.  If SNFs decline, he will need other long term placement.  Complex discharge team following.  Patient is medically cleared.    Addendum: discussed with PT, patient with orthostatic hypotension during standing and ambulation, /86 to 89/51. Will start low dose midodrine 5 mg TID to assist blood pressure for chronic orthostatic hypotension. Patient currently not hypovolemic.    I have discussed this patient's plan of care and discharge plan at IDT rounds today with Case Management, Nursing, Nursing leadership, and other members of the IDT team.    Consultants/Specialty  psychiatry    Code Status  Full Code    Disposition  Medically Cleared  I have placed the appropriate orders for post-discharge needs.    Review of Systems  Review of Systems   Gastrointestinal:  Positive for diarrhea. Negative for constipation.   Musculoskeletal:  Positive for back pain and joint pain.   Neurological:  Negative for dizziness, loss of consciousness and weakness.        Physical Exam  Temp:  [36.3 °C (97.4 °F)-36.7 °C (98.1 °F)] 36.7 °C (98.1 °F)  Pulse:  [82-88] 88  Resp:  [16-18] 18  BP: (107-141)/(64-85) 141/85  SpO2:  [92 %-96 %] 96 %    Physical Exam  Vitals and nursing note reviewed. Exam conducted with a chaperone present (Sitter at bedside).   Constitutional:       General: He is not in acute distress.     Appearance: Normal appearance. He is not ill-appearing, toxic-appearing or diaphoretic.   HENT:      Head: Normocephalic.      Ears:      Comments: +Presbycusis     Nose: Nose normal.      Mouth/Throat:      Mouth: Mucous membranes are moist.   Eyes:      General: No scleral icterus.     Conjunctiva/sclera: Conjunctivae normal.   Pulmonary:      Effort: Pulmonary effort is normal. No respiratory distress.   Genitourinary:     Comments: No jiménez  Skin:     General: Skin is warm and dry.   Neurological:      Mental Status: He is alert.      Comments:  Appropriately conversant, moves all extremities   Psychiatric:         Attention and Perception: Attention and perception normal.         Mood and Affect: Mood normal. Affect is blunt.         Speech: Speech normal.         Behavior: Behavior normal. Behavior is cooperative.         Thought Content: Thought content normal.         Cognition and Memory: Cognition and memory normal.         Judgment: Judgment normal.       Fluids    Intake/Output Summary (Last 24 hours) at 3/3/2025 1041  Last data filed at 3/3/2025 0551  Gross per 24 hour   Intake --   Output 680 ml   Net -680 ml      Laboratory                            Imaging  DX-SHOULDER 2+ RIGHT   Final Result      1.  No evidence of right shoulder fracture or dislocation.      2.  Moderate osteoarthritic changes of the right AC joint      [id number] WITH   Final Result      1.  Unremarkable orbits.   2.  No facial soft tissue swelling or mass.   3.  Paranasal sinuses are clear.      MR-THORACIC SPINE-W/O   Final Result      1.  Moderate chronic compression deformity at T12.   2.  No acute fracture.   3.  Multifocal degenerative disease as described above.   4.  There has been no significant interval change.      MR-LUMBAR SPINE-W/O   Final Result      1.  Postsurgical and degenerative changes as described above.   2.  Moderate chronic compression deformity at T12.   3.  No acute abnormality.   4.  There has been no significant interval change.      CT-LSPINE W/O PLUS RECONS   Final Result      1. No acute fracture or subluxation involving the lumbar spine.   2. Stable anterior wedge compression deformity of the superior endplate of T12.   3. Stable degenerative grade 1 anterolisthesis of L4 with respect to L3.   4. New postsurgical changes of posterior lateral interbody fusion at L4-5.   5. Horseshoe kidney.   6. Circumferential bladder wall thickening.      CT-TSPINE W/O PLUS RECONS   Final Result      Chronic appearing fractures without a definite acute  fracture or change in alignment.      CT-CSPINE WITHOUT PLUS RECONS   Final Result      1. No acute osseous injury of the cervical spine.   2. Stable postsurgical changes of ACDF from C4 through C7.   3. Stable multilevel cervical spondylosis.      CT-HEAD W/O   Final Result      1. Age-related central and cortical atrophy.   2. No acute intracranial abnormality.                    Assessment/Plan  * Urinary retention- (present on admission)  Assessment & Plan  Improved  Patient with acute urinary retention after fall, given extensive back history concern for potential cord abnormality.    Unable to tolerate A1AT due to orthostasis  If worsening retention but improved orthostasis, will discuss with Urology if inpatient TURP is feasible  Bladder scan prn, straight cath for PVR>400cc    Chronic midline back pain- (present on admission)  Assessment & Plan  MRI demonstrates chronic compression fracture and degenerative changes - nonoperative management  Opiates contraindicated due to urinary retention, chronic pain, and mood DO  Multimodal regimen:  Increased gabapentin to 600 mg TID  Scheduled NSAID - HELD DURING STEROIDS  SNRI - venlafaxine  Scheduled Muscle relaxant - robaxin QID  Lidoderm topical therapy  Warm compress  Psychiatry consultation for CBD  Refractory -  5-day course of steroids 2/20-2/24    Orthostatic hypotension  Assessment & Plan  Recurrent  Exacerbated by high-dose tamsulosin, not improved with doxazosin  Improving, have stopped BPH medications thankfully he is urinating well  Cosyntropin stimulation test negative 2/23 for AI  Orthostatic VS and IVF boluses as needed  Patient euvolemic, start midodrine 5 mg TID    Proliferative diabetic retinopathy of both eyes without macular edema associated with type 2 diabetes mellitus (HCC)- (present on admission)  Assessment & Plan  Complains of worsening vision  Patient Previous saw Soraida Bustamante OD on 10/5/2024.  Exam is uploaded into the media tab.  At that  time his right eye visual acuity was 20/40-2, left eye 20/150-1.  Referral was placed to [facility] for diabetic retinopathy and cataracts but patient has been unable to follow-up.  Patient complained of progressively worsening vision more acutely over the past few days.  He had a CT orbits on 2/16 which was unremarkable  Follow up with outpatient ophthalmologist    Frequent falls- (present on admission)  Assessment & Plan  Patient with frequent falls due to chronic and progressively worsening bilateral lower extremity weakness with multiple different spinal surgeries.  Patient does report chronic bowel incontinence and recently has developed urinary retention after previous fall with concern for potential cord compression/abnormality.  Lower extremity weakness 4+ out of 5, likely generalized, however asked by ER provider to admit for MRI evaluation  -MRI of thoracic and lumbar spine show chronic compression fracture at T12, degenerative changes.  No acute fracture  Neurosurgery has recommended for outpatient follow-up and possible epidural injections  - PT/OT recommend post-acute, not a candidate due to legal hold for SI  Now off legal hold, PT/OT to reevaluate, will likely still need SNF    Suicidal ideation- (present on admission)  Assessment & Plan  Legal hold lifted 3/2/25 by psychiatry team  Stable on ritalin and effexor    Acute hypoxic respiratory failure (HCC)- (present on admission)  Assessment & Plan  Resolved    Uncontrolled type 2 diabetes mellitus with hyperglycemia (HCC)- (present on admission)  Assessment & Plan  A1c 9.8  Insulin discontinued due to difficulty managing with impaired dexterity / vision and risk of hypoglycemia  Initiate additional pharmacotherapy (SGLT2, GLP1, etc) after discharge       VTE prophylaxis: Lovenox

## 2025-03-04 PROCEDURE — 770001 HCHG ROOM/CARE - MED/SURG/GYN PRIV*

## 2025-03-04 PROCEDURE — 700102 HCHG RX REV CODE 250 W/ 637 OVERRIDE(OP): Performed by: INTERNAL MEDICINE

## 2025-03-04 PROCEDURE — 700102 HCHG RX REV CODE 250 W/ 637 OVERRIDE(OP): Performed by: EMERGENCY MEDICINE

## 2025-03-04 PROCEDURE — 99232 SBSQ HOSP IP/OBS MODERATE 35: CPT | Performed by: INTERNAL MEDICINE

## 2025-03-04 PROCEDURE — 700102 HCHG RX REV CODE 250 W/ 637 OVERRIDE(OP)

## 2025-03-04 PROCEDURE — 700101 HCHG RX REV CODE 250: Performed by: STUDENT IN AN ORGANIZED HEALTH CARE EDUCATION/TRAINING PROGRAM

## 2025-03-04 PROCEDURE — A9270 NON-COVERED ITEM OR SERVICE: HCPCS | Performed by: STUDENT IN AN ORGANIZED HEALTH CARE EDUCATION/TRAINING PROGRAM

## 2025-03-04 PROCEDURE — A9270 NON-COVERED ITEM OR SERVICE: HCPCS

## 2025-03-04 PROCEDURE — A9270 NON-COVERED ITEM OR SERVICE: HCPCS | Performed by: EMERGENCY MEDICINE

## 2025-03-04 PROCEDURE — A9270 NON-COVERED ITEM OR SERVICE: HCPCS | Performed by: INTERNAL MEDICINE

## 2025-03-04 PROCEDURE — 700111 HCHG RX REV CODE 636 W/ 250 OVERRIDE (IP): Mod: JZ

## 2025-03-04 PROCEDURE — 700102 HCHG RX REV CODE 250 W/ 637 OVERRIDE(OP): Performed by: STUDENT IN AN ORGANIZED HEALTH CARE EDUCATION/TRAINING PROGRAM

## 2025-03-04 RX ADMIN — VENLAFAXINE HYDROCHLORIDE 225 MG: 75 CAPSULE, EXTENDED RELEASE ORAL at 09:00

## 2025-03-04 RX ADMIN — GABAPENTIN 600 MG: 300 CAPSULE ORAL at 12:09

## 2025-03-04 RX ADMIN — METHYLPHENIDATE HYDROCHLORIDE 5 MG: 5 TABLET ORAL at 12:09

## 2025-03-04 RX ADMIN — GLIPIZIDE 10 MG: 5 TABLET ORAL at 09:00

## 2025-03-04 RX ADMIN — LIDOCAINE 2 PATCH: 4 PATCH TOPICAL at 12:09

## 2025-03-04 RX ADMIN — SITAGLIPTIN 100 MG: 100 TABLET, FILM COATED ORAL at 06:14

## 2025-03-04 RX ADMIN — MIDODRINE HYDROCHLORIDE 5 MG: 5 TABLET ORAL at 09:00

## 2025-03-04 RX ADMIN — GABAPENTIN 600 MG: 300 CAPSULE ORAL at 16:34

## 2025-03-04 RX ADMIN — GABAPENTIN 600 MG: 300 CAPSULE ORAL at 06:13

## 2025-03-04 RX ADMIN — MIDODRINE HYDROCHLORIDE 5 MG: 5 TABLET ORAL at 16:33

## 2025-03-04 RX ADMIN — METHOCARBAMOL TABLETS 750 MG: 750 TABLET, COATED ORAL at 12:09

## 2025-03-04 RX ADMIN — SENNOSIDES AND DOCUSATE SODIUM 2 TABLET: 50; 8.6 TABLET ORAL at 16:33

## 2025-03-04 RX ADMIN — LOPERAMIDE HYDROCHLORIDE 2 MG: 2 CAPSULE ORAL at 11:11

## 2025-03-04 RX ADMIN — METHOCARBAMOL TABLETS 750 MG: 750 TABLET, COATED ORAL at 22:12

## 2025-03-04 RX ADMIN — Medication 1000 UNITS: at 06:14

## 2025-03-04 RX ADMIN — PIOGLITAZONE 30 MG: 30 TABLET ORAL at 06:13

## 2025-03-04 RX ADMIN — CALCIUM POLYCARBOPHIL 625 MG: 625 TABLET, FILM COATED ORAL at 16:38

## 2025-03-04 RX ADMIN — CALCIUM POLYCARBOPHIL 625 MG: 625 TABLET, FILM COATED ORAL at 12:09

## 2025-03-04 RX ADMIN — MIDODRINE HYDROCHLORIDE 5 MG: 5 TABLET ORAL at 12:09

## 2025-03-04 RX ADMIN — Medication 10 MG: at 22:11

## 2025-03-04 RX ADMIN — METHOCARBAMOL TABLETS 750 MG: 750 TABLET, COATED ORAL at 09:00

## 2025-03-04 RX ADMIN — OMEPRAZOLE 40 MG: 20 CAPSULE, DELAYED RELEASE ORAL at 06:14

## 2025-03-04 RX ADMIN — METHOCARBAMOL TABLETS 750 MG: 750 TABLET, COATED ORAL at 16:33

## 2025-03-04 RX ADMIN — METHYLPHENIDATE HYDROCHLORIDE 5 MG: 5 TABLET ORAL at 09:00

## 2025-03-04 RX ADMIN — ENOXAPARIN SODIUM 40 MG: 100 INJECTION SUBCUTANEOUS at 16:33

## 2025-03-04 RX ADMIN — CALCIUM POLYCARBOPHIL 625 MG: 625 TABLET, FILM COATED ORAL at 09:00

## 2025-03-04 ASSESSMENT — ANXIETY QUESTIONNAIRES
GAD7 TOTAL SCORE: 13
3. WORRYING TOO MUCH ABOUT DIFFERENT THINGS: NEARLY EVERY DAY
4. TROUBLE RELAXING: NEARLY EVERY DAY
2. NOT BEING ABLE TO STOP OR CONTROL WORRYING: NEARLY EVERY DAY
6. BECOMING EASILY ANNOYED OR IRRITABLE: SEVERAL DAYS
1. FEELING NERVOUS, ANXIOUS, OR ON EDGE: SEVERAL DAYS
7. FEELING AFRAID AS IF SOMETHING AWFUL MIGHT HAPPEN: MORE THAN HALF THE DAYS
5. BEING SO RESTLESS THAT IT IS HARD TO SIT STILL: NOT AT ALL
IF YOU CHECKED OFF ANY PROBLEMS ON THIS QUESTIONNAIRE, HOW DIFFICULT HAVE THESE PROBLEMS MADE IT FOR YOU TO DO YOUR WORK, TAKE CARE OF THINGS AT HOME, OR GET ALONG WITH OTHER PEOPLE: SOMEWHAT DIFFICULT

## 2025-03-04 ASSESSMENT — PAIN DESCRIPTION - PAIN TYPE
TYPE: ACUTE PAIN
TYPE: ACUTE PAIN

## 2025-03-04 ASSESSMENT — PATIENT HEALTH QUESTIONNAIRE - PHQ9
CLINICAL INTERPRETATION OF PHQ2 SCORE: 4
SUM OF ALL RESPONSES TO PHQ QUESTIONS 1-9: 12

## 2025-03-04 ASSESSMENT — FIBROSIS 4 INDEX: FIB4 SCORE: 0.98

## 2025-03-04 NOTE — PROGRESS NOTES
Hospital Medicine Daily Progress Note    Date of Service  3/4/2025    Chief Complaint  Fall, back pain    Hospital Course  Christoph Taylor is a 59 y.o. male with poorly controlled type 2 diabetes mellitus, failure to thrive, history of prior suicidal ideations secondary to major depressive disorder, chronic back pain with history of multiple laminectomies and fractures, recent admission for acute on chronic back pain with MRI of the spine done which showed stable findings and neurosurgery recommending nonoperative management, was recently treated for pneumonia and at that time he was declined by SNF's and discharged to home with home health services and in-home caregiver.  Spine imaging were repeated and again showed stable findings.  He expressed to [place] ideation and was initially placed on legal hold which was ultimately discontinued by psychiatry team.  He was placed on Ritalin and Effexor with good effect.  He briefly had Crowley catheter for urinary retention which was since discontinued.  He was placed on tamsulosin and doxazosin which were eventually stopped due to orthostatic hypotension.  Patient was placed on midodrine.  He was evaluated by PT/OT who recommended postacute placement.  Discharge planning was again pursued and sent out SNF referrals, noting that patient may need long-term care placement.     Interval Problem Update  3/4/2025 - I reviewed the patient's chart. There were no significant overnight events. Remains hemodynamically stable and afebrile. Stable on RA.  No new labs.    > I have personally seen and examined the patient today.  He is comfortable in bed.  Shoulder and back feels sore but otherwise pain is reasonable today.  Tolerated oral diet.  No nausea or vomiting.  No other GI complaints.  Not short of breath.  No chest pain.    I personally reviewed all lab results mentioned above. Prior medical records from this institution and outside facilities were independently reviewed as  noted. I also personally reviewed all ER physician and consultant recommendations and plans as documented above. History was independently obtained by myself. I have discussed this patient's plan of care and discharge plan at IDT rounds today with Case Management, Nursing, Nursing leadership, and other members of the IDT team.    Consultants/Specialty  psychiatry    Code Status  Full Code    Disposition  The patient is medically cleared for discharge to home or a post-acute facility.      Postacute placement.  Difficult discharge.    I have placed the appropriate orders for post-discharge needs.    Review of Systems  ROS     Pertinent positives/negatives as mentioned above.     A complete review of systems was personally done by me. All other systems were negative.       Physical Exam  Temp:  [36.6 °C (97.9 °F)-36.9 °C (98.4 °F)] 36.8 °C (98.2 °F)  Pulse:  [82-86] 86  Resp:  [16-18] 17  BP: (100-139)/(67-90) 135/82  SpO2:  [93 %-95 %] 93 %    Physical Exam  Vitals and nursing note reviewed. Exam conducted with a chaperone present (Sitter at bedside).   Constitutional:       General: He is not in acute distress.     Appearance: Normal appearance. He is not ill-appearing, toxic-appearing or diaphoretic.   HENT:      Head: Normocephalic and atraumatic.      Ears:      Comments: +Presbycusis     Nose: Nose normal.      Mouth/Throat:      Mouth: Mucous membranes are moist.      Pharynx: No oropharyngeal exudate.   Eyes:      General: No scleral icterus.     Extraocular Movements: Extraocular movements intact.      Conjunctiva/sclera: Conjunctivae normal.      Pupils: Pupils are equal, round, and reactive to light.   Cardiovascular:      Rate and Rhythm: Normal rate and regular rhythm.      Heart sounds: Normal heart sounds. No murmur heard.     No gallop.   Pulmonary:      Effort: Pulmonary effort is normal. No respiratory distress.      Breath sounds: Normal breath sounds. No stridor. No wheezing, rhonchi or rales.    Chest:      Chest wall: No tenderness.   Abdominal:      General: Bowel sounds are normal. There is no distension.      Palpations: Abdomen is soft. There is no mass.      Tenderness: There is no abdominal tenderness. There is no guarding or rebound.   Musculoskeletal:         General: No swelling. Normal range of motion.      Cervical back: Normal range of motion and neck supple.   Lymphadenopathy:      Cervical: No cervical adenopathy.   Skin:     General: Skin is warm and dry.      Coloration: Skin is not jaundiced.      Findings: No rash.   Neurological:      General: No focal deficit present.      Mental Status: He is alert and oriented to person, place, and time.      Cranial Nerves: No cranial nerve deficit.      Comments: Conversant, answers questions appropriately   Psychiatric:         Attention and Perception: Attention and perception normal.         Mood and Affect: Mood normal. Affect is blunt.         Speech: Speech normal.         Behavior: Behavior normal. Behavior is cooperative.         Thought Content: Thought content normal.         Cognition and Memory: Cognition and memory normal.         Judgment: Judgment normal.       Fluids    Intake/Output Summary (Last 24 hours) at 3/4/2025 1313  Last data filed at 3/4/2025 0453  Gross per 24 hour   Intake 240 ml   Output 400 ml   Net -160 ml      Laboratory                            Imaging  DX-SHOULDER 2+ RIGHT   Final Result      1.  No evidence of right shoulder fracture or dislocation.      2.  Moderate osteoarthritic changes of the right AC joint      [id number] WITH   Final Result      1.  Unremarkable orbits.   2.  No facial soft tissue swelling or mass.   3.  Paranasal sinuses are clear.      MR-THORACIC SPINE-W/O   Final Result      1.  Moderate chronic compression deformity at T12.   2.  No acute fracture.   3.  Multifocal degenerative disease as described above.   4.  There has been no significant interval change.      MR-LUMBAR  SPINE-W/O   Final Result      1.  Postsurgical and degenerative changes as described above.   2.  Moderate chronic compression deformity at T12.   3.  No acute abnormality.   4.  There has been no significant interval change.      CT-LSPINE W/O PLUS RECONS   Final Result      1. No acute fracture or subluxation involving the lumbar spine.   2. Stable anterior wedge compression deformity of the superior endplate of T12.   3. Stable degenerative grade 1 anterolisthesis of L4 with respect to L3.   4. New postsurgical changes of posterior lateral interbody fusion at L4-5.   5. Horseshoe kidney.   6. Circumferential bladder wall thickening.      CT-TSPINE W/O PLUS RECONS   Final Result      Chronic appearing fractures without a definite acute fracture or change in alignment.      CT-CSPINE WITHOUT PLUS RECONS   Final Result      1. No acute osseous injury of the cervical spine.   2. Stable postsurgical changes of ACDF from C4 through C7.   3. Stable multilevel cervical spondylosis.      CT-HEAD W/O   Final Result      1. Age-related central and cortical atrophy.   2. No acute intracranial abnormality.                    Assessment/Plan  * Urinary retention- (present on admission)  Assessment & Plan  Improved  Patient with acute urinary retention after fall, given extensive back history concern for potential cord abnormality.    Unable to tolerate A1AT due to orthostasis  Continue to monitor for urinary retention.  Continue bladder scan prn, straight cath for PVR>400cc    Chronic midline back pain- (present on admission)  Assessment & Plan  MRI demonstrates chronic compression fracture and degenerative changes - nonoperative management  Opiates contraindicated due to urinary retention, chronic pain, and mood DO  Multimodal regimen:  Continue gabapentin 600 mg TID  Off steroids.  Resume scheduled Mobic 15 mg daily.  Continue SNRI - venlafaxine  Continue scheduled Robaxin.  Continue Lidoderm topical therapy  Warm  compress    Orthostatic hypotension  Assessment & Plan  Recurrent  Exacerbated by high-dose tamsulosin, not improved with doxazosin  Improving, have stopped BPH medications thankfully he is urinating well with no urinary retention.  Cosyntropin stimulation test negative 2/23 for AI  Continue to monitor.  Orthostatic VS and IVF boluses as needed  Patient euvolemic, continue midodrine 5 mg TID    Frequent falls- (present on admission)  Assessment & Plan  -Patient with frequent falls due to chronic and progressively worsening bilateral lower extremity weakness with multiple different spinal surgeries.  Patient does report chronic bowel incontinence and recently has developed urinary retention after previous fall with concern for potential cord compression/abnormality.  Lower extremity weakness 4+ out of 5, likely generalized, however asked by ER provider to admit for MRI evaluation  -MRI of thoracic and lumbar spine show chronic compression fracture at T12, degenerative changes.  No acute fracture  Neurosurgery has recommended for outpatient follow-up and possible epidural injections  - PT/OT recommend post-acute.  Now off legal hold.  Discussed with CM/SW.    Suicidal ideation- (present on admission)  Assessment & Plan  Legal hold lifted 3/2/25 by psychiatry team  Stable on ritalin and effexor    Acute hypoxic respiratory failure (HCC)- (present on admission)  Assessment & Plan  Resolved    Proliferative diabetic retinopathy of both eyes without macular edema associated with type 2 diabetes mellitus (HCC)- (present on admission)  Assessment & Plan  Complains of worsening vision  Patient Previous saw Soraida Bustamante, CLARISA on 10/5/2024.  Exam is uploaded into the media tab.  At that time his right eye visual acuity was 20/40-2, left eye 20/150-1.  Referral was placed to [facility] for diabetic retinopathy and cataracts but patient has been unable to follow-up.  Patient complained of progressively worsening vision  more acutely over the past few days.  He had a CT orbits on 2/16 which was unremarkable  Follow up with outpatient ophthalmologist    Uncontrolled type 2 diabetes mellitus with hyperglycemia (HCC)- (present on admission)  Assessment & Plan  A1c 9.8  Insulin discontinued due to difficulty managing with impaired dexterity / vision and risk of hypoglycemia  Initiate additional pharmacotherapy (SGLT2, GLP1, etc) after discharge       VTE prophylaxis: Lovenox    My total time spent caring for the patient on the day of the encounter was 37 minutes. This does not include time spent on separately billable procedures/tests.

## 2025-03-04 NOTE — DISCHARGE PLANNING
Medical Social Work  MYRON spoke to patient about renting a room from his friend.  Patient stated he is glad SW came to see him as he has good news.  Patient stated his friend, David will be here later tomorrow afternoon to pick him up.  Patient stated he will be living in [facility], [address].  Patient stated that he will have his own room and that he can afford the rent there.     Patient stated he is telling SW now, so he can be ready to discharge when David is here to get him.       MYRON asked patient about establishing himself with a new doctor.  Patient stated he will first transfer his insurance then work with them to find a doctor.     Voalte to Dr Jorgensen with above information.

## 2025-03-04 NOTE — CARE PLAN
The patient is Stable - Low risk of patient condition declining or worsening    Shift Goals  Clinical Goals: Safety, pain management, rest  Patient Goals: rest/comfort  Family Goals: KATHERINE    Progress made toward(s) clinical / shift goals:    Problem: Pain - Standard  Goal: Alleviation of pain or a reduction in pain to the patient’s comfort goal  Outcome: Progressing  Note: Patient reported a tolerable pain level throughout the night.        Problem: Knowledge Deficit - Standard  Goal: Patient and family/care givers will demonstrate understanding of plan of care, disease process/condition, diagnostic tests and medications  Outcome: Progressing  Note: Patient educated on prescribed medication, the need to call for assistance when getting up, the need to remain in a safe environment. pt verbalized understanding       Problem: Provide Safe Environment  Goal: Suicide environmental safety, protocols, policies, and practices will be implemented  Outcome: Progressing     Problem: Psychosocial  Goal: Patient's ability to identify and develop effective coping behaviors will improve  Outcome: Progressing     Problem: Skin Integrity  Goal: Skin integrity is maintained or improved  Outcome: Progressing  Note: Patient reminded to perform frequent turns and repositioning himself      Problem: Mobility  Goal: Risk for activity intolerance will decrease  Outcome: Progressing  Note: Patient ambulated to restroom.      Problem: Safety  Goal: Will remain free from injury  Outcome: Progressing       Patient is not progressing towards the following goals:NA

## 2025-03-04 NOTE — CARE PLAN
The patient is Stable - Low risk of patient condition declining or worsening    Shift Goals  Clinical Goals: safety, comfort  Patient Goals: comfort  Family Goals: NA    Progress made toward(s) clinical / shift goals:    Problem: Pain - Standard  Goal: Alleviation of pain or a reduction in pain to the patient’s comfort goal  Outcome: Progressing     Problem: Knowledge Deficit - Standard  Goal: Patient and family/care givers will demonstrate understanding of plan of care, disease process/condition, diagnostic tests and medications  Outcome: Progressing     Problem: Provide Safe Environment  Goal: Suicide environmental safety, protocols, policies, and practices will be implemented  Outcome: Progressing     Problem: Psychosocial  Goal: Patient's ability to identify and develop effective coping behaviors will improve  Outcome: Progressing  Goal: Patient's ability to identify and utilize available support systems will improve  Outcome: Progressing     Problem: Skin Integrity  Goal: Skin integrity is maintained or improved  Outcome: Progressing     Problem: Infection - Standard  Goal: Patient will remain free from infection  Outcome: Progressing     Problem: Mobility  Goal: Risk for activity intolerance will decrease  Outcome: Progressing     Problem: Safety  Goal: Will remain free from injury  Outcome: Progressing  Goal: Will remain free from falls  Outcome: Progressing       Patient is not progressing towards the following goals:

## 2025-03-04 NOTE — CARE PLAN
The patient is Stable - Low risk of patient condition declining or worsening    Shift Goals  Clinical Goals: Safety, discharge patient  Patient Goals: Rest  Family Goals: Not present    Progress made toward(s) clinical / shift goals:    Problem: Pain - Standard  Goal: Alleviation of pain or a reduction in pain to the patient’s comfort goal  Outcome: Progressing     Problem: Knowledge Deficit - Standard  Goal: Patient and family/care givers will demonstrate understanding of plan of care, disease process/condition, diagnostic tests and medications  Outcome: Progressing     Problem: Provide Safe Environment  Goal: Suicide environmental safety, protocols, policies, and practices will be implemented  Outcome: Progressing     Problem: Psychosocial  Goal: Patient's ability to identify and develop effective coping behaviors will improve  Outcome: Progressing  Goal: Patient's ability to identify and utilize available support systems will improve  Outcome: Progressing     Problem: Skin Integrity  Goal: Skin integrity is maintained or improved  Outcome: Progressing     Problem: Infection - Standard  Goal: Patient will remain free from infection  Outcome: Progressing     Problem: Mobility  Goal: Risk for activity intolerance will decrease  Outcome: Progressing     Problem: Safety  Goal: Will remain free from injury  Outcome: Progressing  Goal: Will remain free from falls  Outcome: Progressing       Patient is not progressing towards the following goals:

## 2025-03-05 PROCEDURE — A9270 NON-COVERED ITEM OR SERVICE: HCPCS

## 2025-03-05 PROCEDURE — A9270 NON-COVERED ITEM OR SERVICE: HCPCS | Performed by: STUDENT IN AN ORGANIZED HEALTH CARE EDUCATION/TRAINING PROGRAM

## 2025-03-05 PROCEDURE — 700102 HCHG RX REV CODE 250 W/ 637 OVERRIDE(OP): Performed by: STUDENT IN AN ORGANIZED HEALTH CARE EDUCATION/TRAINING PROGRAM

## 2025-03-05 PROCEDURE — 99231 SBSQ HOSP IP/OBS SF/LOW 25: CPT | Mod: GC | Performed by: STUDENT IN AN ORGANIZED HEALTH CARE EDUCATION/TRAINING PROGRAM

## 2025-03-05 PROCEDURE — RXMED WILLOW AMBULATORY MEDICATION CHARGE: Performed by: INTERNAL MEDICINE

## 2025-03-05 PROCEDURE — 36415 COLL VENOUS BLD VENIPUNCTURE: CPT

## 2025-03-05 PROCEDURE — 700102 HCHG RX REV CODE 250 W/ 637 OVERRIDE(OP): Performed by: INTERNAL MEDICINE

## 2025-03-05 PROCEDURE — 770001 HCHG ROOM/CARE - MED/SURG/GYN PRIV*

## 2025-03-05 PROCEDURE — 700111 HCHG RX REV CODE 636 W/ 250 OVERRIDE (IP): Mod: JZ

## 2025-03-05 PROCEDURE — A9270 NON-COVERED ITEM OR SERVICE: HCPCS | Performed by: INTERNAL MEDICINE

## 2025-03-05 PROCEDURE — 700102 HCHG RX REV CODE 250 W/ 637 OVERRIDE(OP)

## 2025-03-05 PROCEDURE — 700102 HCHG RX REV CODE 250 W/ 637 OVERRIDE(OP): Performed by: EMERGENCY MEDICINE

## 2025-03-05 PROCEDURE — 99232 SBSQ HOSP IP/OBS MODERATE 35: CPT | Performed by: INTERNAL MEDICINE

## 2025-03-05 PROCEDURE — 700101 HCHG RX REV CODE 250: Performed by: STUDENT IN AN ORGANIZED HEALTH CARE EDUCATION/TRAINING PROGRAM

## 2025-03-05 PROCEDURE — A9270 NON-COVERED ITEM OR SERVICE: HCPCS | Performed by: EMERGENCY MEDICINE

## 2025-03-05 PROCEDURE — 86480 TB TEST CELL IMMUN MEASURE: CPT

## 2025-03-05 RX ORDER — METHOCARBAMOL 750 MG/1
750 TABLET, FILM COATED ORAL 4 TIMES DAILY
Qty: 360 TABLET | Refills: 0 | Status: SHIPPED | OUTPATIENT
Start: 2025-03-05

## 2025-03-05 RX ORDER — MIDODRINE HYDROCHLORIDE 5 MG/1
5 TABLET ORAL
Qty: 270 TABLET | Refills: 0 | Status: SHIPPED | OUTPATIENT
Start: 2025-03-05

## 2025-03-05 RX ORDER — VENLAFAXINE 75 MG/1
75 TABLET ORAL DAILY
Qty: 100 TABLET | Refills: 1 | Status: SHIPPED | OUTPATIENT
Start: 2025-03-05

## 2025-03-05 RX ORDER — METHYLPHENIDATE HYDROCHLORIDE 5 MG/1
5 TABLET ORAL 2 TIMES DAILY
Qty: 60 TABLET | Refills: 0 | Status: SHIPPED | OUTPATIENT
Start: 2025-03-05 | End: 2025-03-07

## 2025-03-05 RX ORDER — OMEPRAZOLE 40 MG/1
40 CAPSULE, DELAYED RELEASE ORAL DAILY
Qty: 100 CAPSULE | Refills: 1 | Status: SHIPPED | OUTPATIENT
Start: 2025-03-05 | End: 2025-09-21

## 2025-03-05 RX ORDER — PHENOL 1.4 %
10 AEROSOL, SPRAY (ML) MUCOUS MEMBRANE NIGHTLY
Qty: 100 TABLET | Refills: 0 | Status: SHIPPED | OUTPATIENT
Start: 2025-03-05

## 2025-03-05 RX ORDER — GLIPIZIDE 10 MG/1
10 TABLET ORAL
Qty: 100 TABLET | Refills: 1 | Status: SHIPPED | OUTPATIENT
Start: 2025-03-05

## 2025-03-05 RX ORDER — CALCIUM POLYCARBOPHIL 625 MG
625 TABLET ORAL
Qty: 300 TABLET | Refills: 0 | Status: SHIPPED | OUTPATIENT
Start: 2025-03-05

## 2025-03-05 RX ORDER — PIOGLITAZONE 30 MG/1
30 TABLET ORAL DAILY
Qty: 100 TABLET | Refills: 1 | Status: SHIPPED | OUTPATIENT
Start: 2025-03-05

## 2025-03-05 RX ORDER — GABAPENTIN 300 MG/1
600 CAPSULE ORAL 3 TIMES DAILY
Qty: 540 CAPSULE | Refills: 0 | Status: SHIPPED | OUTPATIENT
Start: 2025-03-05

## 2025-03-05 RX ORDER — MELOXICAM 15 MG/1
15 TABLET ORAL DAILY
Qty: 100 TABLET | Refills: 0 | Status: SHIPPED | OUTPATIENT
Start: 2025-03-06

## 2025-03-05 RX ORDER — LIDOCAINE 4 G/G
2 PATCH TOPICAL EVERY 24 HOURS
Qty: 200 PATCH | Refills: 0 | Status: SHIPPED | OUTPATIENT
Start: 2025-03-05

## 2025-03-05 RX ADMIN — LIDOCAINE 2 PATCH: 4 PATCH TOPICAL at 11:52

## 2025-03-05 RX ADMIN — GABAPENTIN 600 MG: 300 CAPSULE ORAL at 11:52

## 2025-03-05 RX ADMIN — Medication 1000 UNITS: at 05:54

## 2025-03-05 RX ADMIN — GABAPENTIN 600 MG: 300 CAPSULE ORAL at 16:54

## 2025-03-05 RX ADMIN — GLIPIZIDE 10 MG: 5 TABLET ORAL at 07:45

## 2025-03-05 RX ADMIN — MIDODRINE HYDROCHLORIDE 5 MG: 5 TABLET ORAL at 11:54

## 2025-03-05 RX ADMIN — VENLAFAXINE HYDROCHLORIDE 225 MG: 75 CAPSULE, EXTENDED RELEASE ORAL at 07:45

## 2025-03-05 RX ADMIN — MIDODRINE HYDROCHLORIDE 5 MG: 5 TABLET ORAL at 16:54

## 2025-03-05 RX ADMIN — METHYLPHENIDATE HYDROCHLORIDE 5 MG: 5 TABLET ORAL at 11:52

## 2025-03-05 RX ADMIN — METHOCARBAMOL TABLETS 750 MG: 750 TABLET, COATED ORAL at 07:45

## 2025-03-05 RX ADMIN — METHOCARBAMOL TABLETS 750 MG: 750 TABLET, COATED ORAL at 22:07

## 2025-03-05 RX ADMIN — Medication 10 MG: at 22:07

## 2025-03-05 RX ADMIN — METHOCARBAMOL TABLETS 750 MG: 750 TABLET, COATED ORAL at 11:52

## 2025-03-05 RX ADMIN — METHYLPHENIDATE HYDROCHLORIDE 5 MG: 5 TABLET ORAL at 07:45

## 2025-03-05 RX ADMIN — GABAPENTIN 600 MG: 300 CAPSULE ORAL at 05:54

## 2025-03-05 RX ADMIN — ENOXAPARIN SODIUM 40 MG: 100 INJECTION SUBCUTANEOUS at 16:54

## 2025-03-05 RX ADMIN — CALCIUM POLYCARBOPHIL 625 MG: 625 TABLET, FILM COATED ORAL at 16:54

## 2025-03-05 RX ADMIN — PIOGLITAZONE 30 MG: 30 TABLET ORAL at 05:54

## 2025-03-05 RX ADMIN — OMEPRAZOLE 40 MG: 20 CAPSULE, DELAYED RELEASE ORAL at 05:54

## 2025-03-05 RX ADMIN — CALCIUM POLYCARBOPHIL 625 MG: 625 TABLET, FILM COATED ORAL at 07:45

## 2025-03-05 RX ADMIN — SITAGLIPTIN 100 MG: 100 TABLET, FILM COATED ORAL at 05:54

## 2025-03-05 RX ADMIN — MIDODRINE HYDROCHLORIDE 5 MG: 5 TABLET ORAL at 07:47

## 2025-03-05 RX ADMIN — METHOCARBAMOL TABLETS 750 MG: 750 TABLET, COATED ORAL at 16:54

## 2025-03-05 RX ADMIN — MELOXICAM 15 MG: 7.5 TABLET ORAL at 05:54

## 2025-03-05 RX ADMIN — CALCIUM POLYCARBOPHIL 625 MG: 625 TABLET, FILM COATED ORAL at 11:52

## 2025-03-05 ASSESSMENT — PAIN DESCRIPTION - PAIN TYPE
TYPE: ACUTE PAIN
TYPE: ACUTE PAIN

## 2025-03-05 NOTE — CARE PLAN
The patient is Stable - Low risk of patient condition declining or worsening    Shift Goals  Clinical Goals: Safety  Patient Goals: POC updates  Family Goals: Not present    Progress made toward(s) clinical / shift goals:        Problem: Pain - Standard  Goal: Alleviation of pain or a reduction in pain to the patient’s comfort goal  Outcome: Progressing     Problem: Knowledge Deficit - Standard  Goal: Patient and family/care givers will demonstrate understanding of plan of care, disease process/condition, diagnostic tests and medications  Outcome: Progressing     Problem: Skin Integrity  Goal: Skin integrity is maintained or improved  Outcome: Progressing     Problem: Infection - Standard  Goal: Patient will remain free from infection  Outcome: Progressing     Problem: Mobility  Goal: Risk for activity intolerance will decrease  Outcome: Progressing     Problem: Safety  Goal: Will remain free from injury  Outcome: Progressing  Goal: Will remain free from falls  Outcome: Progressing       Patient is not progressing towards the following goals:

## 2025-03-05 NOTE — PROGRESS NOTES
Hospital Medicine Daily Progress Note    Date of Service  3/5/2025    Chief Complaint  Fall, back pain    Hospital Course  Christoph Taylor is a 59 y.o. male with poorly controlled type 2 diabetes mellitus, failure to thrive, history of prior suicidal ideations secondary to major depressive disorder, chronic back pain with history of multiple laminectomies and fractures, recent admission for acute on chronic back pain with MRI of the spine done which showed stable findings and neurosurgery recommending nonoperative management, was recently treated for pneumonia and at that time he was declined by SNF's and discharged to home with home health services and in-home caregiver.  Spine imaging were repeated and again showed stable findings.  He expressed to Suicidal ideation and was initially placed on legal hold which was ultimately discontinued by psychiatry team.  He was placed on Ritalin and Effexor with good effect.  He briefly had Crowley catheter for urinary retention which was since discontinued.  He was placed on tamsulosin and doxazosin which were eventually stopped due to orthostatic hypotension.  Patient was placed on midodrine.  He was able to urinate spontaneously with no further urinary retention.  He was evaluated by PT/OT who recommended postacute placement.  Discharge planning was again pursued and sent out SNF referrals, noting that patient may need long-term care placement.     Interval Problem Update  3/5/2025 - I reviewed the patient's chart today. Uneventful night. VSS. Afebrile. Saturating well on RA.  Per CM/SW, patient agreed to rent a room from his friend and is planning to move to [facility], however plan has fallen through (apparently friend did not tell his wife that patient is moving in).    > I have personally seen and examined the patient today.  Not in pain and not in distress.  Patient states plan for his friend to pick him up today fell through due to personal reasons. He also wants  to speak with psychiatry.  Otherwise no complaints.  Not short of breath.  No GI complaints.    I personally reviewed all lab results mentioned above. Prior medical records from this institution and outside facilities were independently reviewed as noted. I also personally reviewed all ER physician and consultant recommendations and plans as documented above. History was independently obtained by myself. I have discussed this patient's plan of care and discharge plan at IDT rounds today with Case Management, Nursing, Nursing leadership, and other members of the IDT team.    Consultants/Specialty  psychiatry    Code Status  Full Code    Disposition  The patient is medically cleared for discharge to home or a post-acute facility.      Postacute placement.  May need group home placement.  Difficult discharge.    I have placed the appropriate orders for post-discharge needs.    Review of Systems  ROS     Pertinent positives/negatives as mentioned above.     A complete review of systems was personally done by me. All other systems were negative.       Physical Exam  Temp:  [36.2 °C (97.2 °F)-36.9 °C (98.4 °F)] 36.3 °C (97.3 °F)  Pulse:  [70-82] 75  Resp:  [15-17] 17  BP: (105-129)/(60-80) 129/80  SpO2:  [94 %-95 %] 95 %    Physical Exam  Vitals and nursing note reviewed. Exam conducted with a chaperone present (Sitter at bedside).   Constitutional:       General: He is not in acute distress.     Appearance: Normal appearance. He is not ill-appearing, toxic-appearing or diaphoretic.   HENT:      Head: Normocephalic and atraumatic.      Ears:      Comments: +Presbycusis     Nose: Nose normal.      Mouth/Throat:      Mouth: Mucous membranes are moist.      Pharynx: No oropharyngeal exudate.   Eyes:      General: No scleral icterus.     Extraocular Movements: Extraocular movements intact.      Conjunctiva/sclera: Conjunctivae normal.      Pupils: Pupils are equal, round, and reactive to light.   Cardiovascular:      Rate and  Rhythm: Normal rate and regular rhythm.      Heart sounds: Normal heart sounds. No murmur heard.     No gallop.   Pulmonary:      Effort: Pulmonary effort is normal. No respiratory distress.      Breath sounds: Normal breath sounds. No stridor. No wheezing, rhonchi or rales.   Chest:      Chest wall: No tenderness.   Abdominal:      General: Bowel sounds are normal. There is no distension.      Palpations: Abdomen is soft. There is no mass.      Tenderness: There is no abdominal tenderness. There is no guarding or rebound.   Musculoskeletal:         General: No swelling. Normal range of motion.      Cervical back: Normal range of motion and neck supple.   Lymphadenopathy:      Cervical: No cervical adenopathy.   Skin:     General: Skin is warm and dry.      Coloration: Skin is not jaundiced.      Findings: No rash.   Neurological:      General: No focal deficit present.      Mental Status: He is alert and oriented to person, place, and time.      Cranial Nerves: No cranial nerve deficit.      Comments: Conversant, answers questions appropriately   Psychiatric:         Attention and Perception: Attention and perception normal.         Mood and Affect: Mood normal. Affect is blunt.         Speech: Speech normal.         Behavior: Behavior normal. Behavior is cooperative.         Thought Content: Thought content normal.         Cognition and Memory: Cognition and memory normal.         Judgment: Judgment normal.       I have performed the physical examination today 3/5/2025.  In review of yesterday's note, there are no new changes except as documented above.      Fluids    Intake/Output Summary (Last 24 hours) at 3/5/2025 1002  Last data filed at 3/5/2025 0700  Gross per 24 hour   Intake --   Output 700 ml   Net -700 ml      Laboratory                            Imaging  DX-SHOULDER 2+ RIGHT   Final Result      1.  No evidence of right shoulder fracture or dislocation.      2.  Moderate osteoarthritic changes of the  right AC joint      [id number] WITH   Final Result      1.  Unremarkable orbits.   2.  No facial soft tissue swelling or mass.   3.  Paranasal sinuses are clear.      MR-THORACIC SPINE-W/O   Final Result      1.  Moderate chronic compression deformity at T12.   2.  No acute fracture.   3.  Multifocal degenerative disease as described above.   4.  There has been no significant interval change.      MR-LUMBAR SPINE-W/O   Final Result      1.  Postsurgical and degenerative changes as described above.   2.  Moderate chronic compression deformity at T12.   3.  No acute abnormality.   4.  There has been no significant interval change.      CT-LSPINE W/O PLUS RECONS   Final Result      1. No acute fracture or subluxation involving the lumbar spine.   2. Stable anterior wedge compression deformity of the superior endplate of T12.   3. Stable degenerative grade 1 anterolisthesis of L4 with respect to L3.   4. New postsurgical changes of posterior lateral interbody fusion at L4-5.   5. Horseshoe kidney.   6. Circumferential bladder wall thickening.      CT-TSPINE W/O PLUS RECONS   Final Result      Chronic appearing fractures without a definite acute fracture or change in alignment.      CT-CSPINE WITHOUT PLUS RECONS   Final Result      1. No acute osseous injury of the cervical spine.   2. Stable postsurgical changes of ACDF from C4 through C7.   3. Stable multilevel cervical spondylosis.      CT-HEAD W/O   Final Result      1. Age-related central and cortical atrophy.   2. No acute intracranial abnormality.                    Assessment/Plan  * Urinary retention- (present on admission)  Assessment & Plan  Improved  Patient with acute urinary retention after fall, given extensive back history concern for potential cord abnormality.    Unable to tolerate A1AT due to orthostasis  Continue to monitor for urinary retention.  Continue bladder scan prn, straight cath for PVR>400cc    Chronic midline back pain- (present on  admission)  Assessment & Plan  MRI demonstrates chronic compression fracture and degenerative changes - nonoperative management  Opiates contraindicated due to urinary retention, chronic pain, and mood DO  Multimodal regimen:  Continue gabapentin 600 mg TID  Off steroids.  Resume scheduled Mobic 15 mg daily.  Continue SNRI - venlafaxine  Continue scheduled Robaxin.  Continue Lidoderm topical therapy  Warm compress    Orthostatic hypotension  Assessment & Plan  Recurrent  Exacerbated by high-dose tamsulosin, not improved with doxazosin  Improving, have stopped BPH medications thankfully he is urinating well with no urinary retention.  Cosyntropin stimulation test negative 2/23 for AI  Continue to monitor.  Orthostatic VS and IVF boluses as needed  Patient euvolemic, continue midodrine 5 mg TID    Frequent falls- (present on admission)  Assessment & Plan  -Patient with frequent falls due to chronic and progressively worsening bilateral lower extremity weakness with multiple different spinal surgeries.  Patient does report chronic bowel incontinence and recently has developed urinary retention after previous fall with concern for potential cord compression/abnormality.  Lower extremity weakness 4+ out of 5, likely generalized, however asked by ER provider to admit for MRI evaluation  -MRI of thoracic and lumbar spine show chronic compression fracture at T12, degenerative changes.  No acute fracture  Neurosurgery has recommended for outpatient follow-up and possible epidural injections  - PT/OT recommend post-acute.  Now off legal hold.  Discussed with CM/SW. Looking at group home placement.  Send for QuantiFERON gold TB test.    Suicidal ideation- (present on admission)  Assessment & Plan  Legal hold lifted 3/2/25 by psychiatry team  Stable on ritalin and effexor    Acute hypoxic respiratory failure (HCC)- (present on admission)  Assessment & Plan  Resolved    Proliferative diabetic retinopathy of both eyes without  macular edema associated with type 2 diabetes mellitus (HCC)- (present on admission)  Assessment & Plan  Complains of worsening vision  Patient Previous saw Soraida Bustamante, OD on 10/5/2024.  Exam is uploaded into the media tab.  At that time his right eye visual acuity was 20/40-2, left eye 20/150-1.  Referral was placed to [facility] for diabetic retinopathy and cataracts but patient has been unable to follow-up.  Patient complained of progressively worsening vision more acutely over the past few days.  He had a CT orbits on 2/16 which was unremarkable  Follow up with outpatient ophthalmologist    Uncontrolled type 2 diabetes mellitus with hyperglycemia (HCC)- (present on admission)  Assessment & Plan  A1c 9.8  Insulin discontinued due to difficulty managing with impaired dexterity / vision and risk of hypoglycemia  Initiate additional pharmacotherapy (SGLT2, GLP1, etc) after discharge       VTE prophylaxis: Lovenox    My total time spent caring for the patient on the day of the encounter was 36 minutes. This does not include time spent on separately billable procedures/tests.

## 2025-03-05 NOTE — CONSULTS
"PSYCHIATRIC FOLLOW UP:    Reason for Admission: Urinary retention  Reason for Consult: SI and failure to thrive  Source of Information: pt and RN  Supervising Physician: Dr. Kandy Shelley    Subjective:  Patient is a 59 y.o. male with history of T2DM, degenerative disc disease, diabetic retinopathy, neuropathy, navicular fracture of foot, failure to thrive, prior SI 2/2 MDD who presented to the hospital for worsening back pain and urinary retention and was put on legal hold for SI.  He was seen by psychology and psychiatry was consulted for medication management and he was started on Effexor for neuropathic pain and mood and Ritalin for failure to thrive. He has been taken off of his legal hold and a safety plan was administered with Dr. Yanes.  When seeing him today, he reported that the plan to live with his friend \"fell through\" stating that his friend had told him he would get back to him on Sunday whether or not he can live with them.  He is amenable at this time to going to a group home in the interim and would like to eventually moved to [place] where he will be surrounded by a more supportive environment.  Mood: \"been better\"  Sleep: okay  Energy: okay  Appetite: okay  SI/HI: denies  Psychosis: denies  Nursing report: Patient has been medication adherent, eating meals, and has been making some phone calls to the plan for discharge.  There are no acute concerns and he has not been receiving as needed medications for agitation.   Social work had met with patient and stated that he will be with people today to be interviewed for a group home.  At this time, he requested an iPad so that he can call the [facility] to figure out the care of his dog as his neighbor watching the dog has surgery on the 13th.    Psychiatric Examination:   Vitals: /80   Pulse 75   Temp 36.3 °C (97.3 °F) (Temporal)   Resp 17   Ht 1.829 m (6')   Wt 74 kg (163 lb 2.3 oz)   SpO2 95%   BMI 22.13 kg/m² " "  Musculoskeletal: No abnormal movements noted  Appearance: well-developed, appears stated age, fair hygiene, no apparent distress, and thin, cooperative, engaged, pleasant, and good eye contact  Thoughts: Denies SI, denies HI, and no overt delusions noted, linear, coherent, and organized  Speech: regular rate, rhythm, volume, tone, and syntax  Mood:  \" Been better\"  Affect: restricted and congruent with mood  SI/HI: Denies SI and HI  Alert/Oriented: alert and oriented  Memory: no gross impairment in immediate, recent, or remote memory  Fund of Knowledge: adequate  Insight/Judgement into symptoms: good  Neurological Testing: MMSE not performed during this encounter    Medications (currently prescribed at [facility]):    Current Facility-Administered Medications:     midodrine (Proamatine) tablet 5 mg, 5 mg, Oral, TID WITH MEALS, Myke Bustamante M.D., 5 mg at 03/05/25 0747    gabapentin (Neurontin) capsule 600 mg, 600 mg, Oral, TID, MICHAEL MaganaAOlga-DIAMANTE, 600 mg at 03/05/25 0554    calcium polycarbophil (Fibercon) tablet 625 mg, 625 mg, Oral, TID WITH MEALS, Myke Bustamante M.D., 625 mg at 03/05/25 0745    diclofenac sodium (Voltaren) 1 % gel 2 g, 2 g, Topical, 4X/DAY PRN, Myke Bustamante M.D., 2 g at 03/03/25 2311    melatonin tablet 10 mg, 10 mg, Oral, Nightly, Wyatt Houston M.D., 10 mg at 03/04/25 2211    venlafaxine XR (Effexor XR) capsule 225 mg, 225 mg, Oral, QDAY with Breakfast, Natacha Castillo M.D., 225 mg at 03/05/25 0745    methylphenidate (Ritalin) tablet 5 mg, 5 mg, Oral, DAILY WITH LUNCH, Natacha Castillo M.D., 5 mg at 03/04/25 1209    methylphenidate (Ritalin) tablet 5 mg, 5 mg, Oral, QDAY with Breakfast, Natacha Castillo M.D., 5 mg at 03/05/25 0745    methocarbamol (Robaxin) tablet 750 mg, 750 mg, Oral, 4X/DAY, Wyatt Houston M.D., 750 mg at 03/05/25 0745    meloxicam (Mobic) tablet 15 mg, 15 mg, Oral, DAILY, Sinan Jorgensen M.D., 15 mg at 03/05/25 0554    lidocaine (Asperflex) 4 % patch 2 Patch, 2 " Patch, Transdermal, Q24HR, Wyatt Houston M.D., 2 Patch at 03/04/25 1209    vitamin D3 (Cholecalciferol) tablet 1,000 Units, 1,000 Units, Oral, DAILY, Ayo Fernandez M.D., 1,000 Units at 03/05/25 0554    glipiZIDE (Glucotrol) tablet 10 mg, 10 mg, Oral, QAM AC, VALENTÍN Kim.O., 10 mg at 03/05/25 0745    pioglitazone (Actos) tablet 30 mg, 30 mg, Oral, DAILY, JOSY iKmO., 30 mg at 03/05/25 0554    SITagliptin (Januvia) tablet 100 mg, 100 mg, Oral, DAILY, JOSY KimOOlga, 100 mg at 03/05/25 0554    loperamide (Imodium) capsule 2 mg, 2 mg, Oral, 4X/DAY PRN, JOSY KimOOlga, 2 mg at 03/04/25 1111    omeprazole (PriLOSEC) capsule 40 mg, 40 mg, Oral, DAILY, JOSY SilvaOOlga, 40 mg at 03/05/25 0554    enoxaparin (Lovenox) inj 40 mg, 40 mg, Subcutaneous, DAILY AT 1800, Anatoliy Eagle D.O., 40 mg at 03/04/25 1633    senna-docusate (Pericolace Or Senokot S) 8.6-50 MG per tablet 2 Tablet, 2 Tablet, Oral, Q EVENING, 2 Tablet at 03/04/25 1633 **AND** polyethylene glycol/lytes (Miralax) Packet 1 Packet, 1 Packet, Oral, QDAY PRN, Anatoliy Eagle D.O.    Pharmacy Consult Request ...Pain Management Review 1 Each, 1 Each, Other, PHARMACY TO DOSE, Anatoliy Eagle D.O.    ondansetron (Zofran) syringe/vial injection 4 mg, 4 mg, Intravenous, Q4HRS PRN, JOSY KimOOlga, 4 mg at 02/11/25 0948    [DISCONTINUED] insulin lispro (HumaLOG,AdmeLOG) subcutaneous injection, 3-14 Units, Subcutaneous, 4X/DAY ACHS, 4 Units at 02/17/25 2212 **AND** [CANCELED] POC blood glucose manual result, , , Q AC AND BEDTIME(S) **AND** NOTIFY MD and PharmD, , , Once **AND** Administer 20 grams of glucose (approximately 8 ounces of fruit juice) every 15 minutes PRN FSBG less than 70 mg/dL, , , PRN **AND** dextrose 50% (D50W) injection 25 g, 25 g, Intravenous, Q15 MIN PRN, Rico Peters D.O.      Labs: no new results over last 72 hours         Assessment:  Patient is a 59-year-old male with history of type 2  diabetes mellitus, degenerative disc disease, neuropathy, failure to thrive and SI secondary to MDD who is being hospitalized for pain management and was on a legal hold for SI with a plan.  Over the course of hospitalization, appetite and energy have improved on Ritalin and Effexor and he no longer appears to pose a risk of harm to self denying suicidal plan or intent and was able to complete a safety plan with psychology and legal hold was decertified.  At this time, he continues to show improvement in mood and is receptive to reframing negative circumstances and attempting to take care of his problems 1 at a time and he currently shows motivation to make sure his dog has a shelter.  While his Effexor is maximized, he would benefit from tapering off of Ritalin as his energy has improved, he is less fatigued during the day and he is eating more.  Currently denies SI/HI/AVH.    Diagnosis  MDD, severe  R/O cluster B personality traits    Plan:  -Decrease Ritalin to 5 mg daily with plan to taper off since Effexor is maximized and mood has improved  -Continue Effexor 225 mg daily for MDD  -Continue vitamin D supplementation  -Reviewed risks, benefits, alternatives to include no treatment, therapy and medications  -Patient will be discharged to a group home pending interview  -Discussed case with Dr. Sinan Jorgensen and Dr. Shelley     Will continue to follow patient  - Reviewed risks, benefits, alternatives to include no treatment, therapy and medications

## 2025-03-05 NOTE — DISCHARGE PLANNING
Medical Social Work  SW spoke to patient about his friend David picking him up today.  Patient stated that there has been a change of plan.  It appears David did not talk to his wife about patient moving in with him.  Patient stated he does not have a place to go now.    SW asked patient if he has heard of group homes.  Patient stated yes, requested additional information.  What services due they provide/cost.  SW provide information requested, stating that patient would have his basic needs provided,food, shelter etc, would also let him know when he is to take his medications, provide them to him.      SW went over cost of care, and that he would be providing all but $160 of his income to them.      Patient stated he would go to one.    SW told patient that this placement at a group home does not need to be a forever placement.  If he wants to move to [place] to be with friends/family he can move.      PC to John at [facility], SW told they do have an opening for a shared room.  SW provided update on patient, needs, etc.  John stated he can come out this afternoon to assess for acceptance.    Voalte to Dr Jorgensen with an update, requested a quant

## 2025-03-05 NOTE — CARE PLAN
The patient is Stable - Low risk of patient condition declining or worsening    Shift Goals  Clinical Goals: safety  Patient Goals: see his dog  Family Goals: not present    Progress made toward(s) clinical / shift goals:    Problem: Pain - Standard  Goal: Alleviation of pain or a reduction in pain to the patient’s comfort goal  Outcome: Progressing     Problem: Knowledge Deficit - Standard  Goal: Patient and family/care givers will demonstrate understanding of plan of care, disease process/condition, diagnostic tests and medications  Outcome: Progressing     Problem: Safety  Goal: Will remain free from injury  Outcome: Progressing

## 2025-03-05 NOTE — CONSULTS
"[facility]   Psychotherapy Session Follow up    Name: Christoph Taylor  MRN: [id number]  : 1965  Age: 59 y.o.  Date of assessment: 25  PCP: YESY Tariq.  Persons in attendance: Patient    HPI  \"Patient is a 59-year-old male with extensive past medical history of poorly controlled type 2 diabetes mellitus, failure to thrive, and prior suicidal ideations secondary to major depressive disorder as well as his chronic back pain status post multiple laminectomies and fractures who presents due to recurrent falls as well as progressive worsening back pain. Patient was initially held in the emergency department due to suicidal ideation, did not meet any admission criteria, and had progressive worsening of his bilateral lower extremity. Patient reports he chronically has fecal incontinence for the past several months, no other new neurologic symptoms. However, patient does report new onset of acute urinary retention prompting ER provider to order stat MRI of thoracic and lumbar spine due to previous evidence of cord compression.    MRI of thoracic and lumbar showed chronic compression deformity at T12.  Postsurgical and degenerative changes.  No acute fracture.  No significant interval change.   Patient had persistent urinary retention requiring Crowley catheter placement.  Started on tamsulosin.   Patient has difficulty taking care of himself.  He has difficulty managing his diabetes.  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  Also states decreased range of motion and flexibility, dexterity of his bilateral hands which is previous cervical surgery was supposed to fix.  States he has a lot of trouble with glucometer, lancets, using insulin pens.  He was previously switched to oral hypoglycemic agents but still was not under good control and he would still like to be able to check his blood sugar because it dropped severely low previously while he was at " "[place].  He apparently started having convulsions and they thought a cardiac arrest.  He also has poor vision making very difficult to do manage his meds and glucometer.  Previously saw optometry and diagnosed with diabetic retinopathy and cataracts.   Was placed on legal hold for suicidal ideations.\" [facility].    Psychotherapy Session Summary  Christoph Taylor is a 59 year old male seen sitting up in hospital bed alert and oriented. Patients speech was clear and coherent. Patient denies suicidal or homicidal ideations. Patient has no signs of a thought disorder. Clinician had to speak at an elevated and loud voice to accommodate for patients hearing loss. Patient continues to report a decreased ability to see clearly.     Patient reports making continued progress on the agreed upon tasks with clinician. Patient reports his friend and his friends wife from [place], [place] are willing to rent a room to him in their home. Patient reports he will be able to live with his dog here, at least temporarily. Patient acknowledges this as a temporary living situation, however he is happy he gets to spend more time with his dog and be around his friend. Patient reports that (weather dependent), his friend should be at the hospital tomorrow morning to help him discharge. Additionally, patient will live closer to his son who lives in [place]. Patient reports being in the process of applying to [place] insurance. Patient reports coordinating picking up his cell phone from [place] and continuing to follow through with his realtor regarding the sale of his home. Patient identified other future goals including walking, exercising on his friends stationary bike, and building his strength back.    Clinician utilized a Strength-Based approach to recognize the effort patient has made in the past week towards achieving desired outcomes. Patient has a more future-focused perspective " and has seen marked decreases in symptoms of depression in the past two weeks. Although patient resorts to negative thinking patterns, patient reports a new-found desire to work towards solving his problems rather than isolating and feeling hopeless. Patient recognized his need to be around and connect with other people, and expressed a desire to spend more time with friends and family.     Chief Complaint   Patient presents with    T-5000 FALL    Back Pain          SAFETY ASSESSMENT - SELF  Does patient acknowledge current or past symptoms of dangerousness to self? yes   Does parent/significant other report patient has current or past symptoms of dangerousness to self? N\A     Does presenting problem suggest symptoms of dangerousness to self? No      SAFETY ASSESSMENT - OTHERS  Does patient acknowledge current or past symptoms of aggressive behavior or risk to others? yes   Does parent/significant other report patient has current or past symptoms of aggressive behavior or risk to others? N\A     Does presenting problem suggest symptoms of dangerousness to others?  No      MENTAL STATUS  Participation Active verbal participation   Grooming Disheveled   Orientation Alert   Behavior Tense   Eye contact Limited   Mood Depressed and Anxious   Affect Anxious   Thought Process Circumstantial   Thought Content Within normal limits   Speech Loud   Perception Within normal limits   Memory No gross evidence of memory deficits   Insight Limited   Judgement Limited   Other      Collateral Information:   Source: [facility] Medical Record- Reviewed    Unable to complete full assessment due to:  NA - Assessment completed    CLINICAL IMPRESSIONS:  Primary:  Major Depressive Disorder, Severe  Secondary:  R/O Cluster B personality traits                                  Case management team: Do not discharge patient unless friend from [place] is actively present at hospital at time of . Patient is unable to take care of himself  without continued assistance post-discharge.      Joon Anderson, Student  Farzana Tripathi, Ph.D., [facility]  3/4/2025    Length of Intervention:  30 minutes

## 2025-03-06 LAB
GAMMA INTERFERON BACKGROUND BLD IA-ACNC: 0.05 IU/ML
GLUCOSE BLD STRIP.AUTO-MCNC: 252 MG/DL (ref 65–99)
M TB IFN-G BLD-IMP: NEGATIVE
M TB IFN-G CD4+ BCKGRND COR BLD-ACNC: -0.01 IU/ML
MITOGEN IGNF BCKGRD COR BLD-ACNC: 9.15 IU/ML
QFT TB2 - NIL TBQ2: -0.01 IU/ML

## 2025-03-06 PROCEDURE — A9270 NON-COVERED ITEM OR SERVICE: HCPCS | Performed by: INTERNAL MEDICINE

## 2025-03-06 PROCEDURE — A9270 NON-COVERED ITEM OR SERVICE: HCPCS | Performed by: EMERGENCY MEDICINE

## 2025-03-06 PROCEDURE — 700102 HCHG RX REV CODE 250 W/ 637 OVERRIDE(OP): Performed by: STUDENT IN AN ORGANIZED HEALTH CARE EDUCATION/TRAINING PROGRAM

## 2025-03-06 PROCEDURE — A9270 NON-COVERED ITEM OR SERVICE: HCPCS

## 2025-03-06 PROCEDURE — 700102 HCHG RX REV CODE 250 W/ 637 OVERRIDE(OP): Performed by: INTERNAL MEDICINE

## 2025-03-06 PROCEDURE — 700101 HCHG RX REV CODE 250: Performed by: STUDENT IN AN ORGANIZED HEALTH CARE EDUCATION/TRAINING PROGRAM

## 2025-03-06 PROCEDURE — 82962 GLUCOSE BLOOD TEST: CPT

## 2025-03-06 PROCEDURE — 700102 HCHG RX REV CODE 250 W/ 637 OVERRIDE(OP)

## 2025-03-06 PROCEDURE — 700111 HCHG RX REV CODE 636 W/ 250 OVERRIDE (IP): Mod: JZ

## 2025-03-06 PROCEDURE — 700102 HCHG RX REV CODE 250 W/ 637 OVERRIDE(OP): Performed by: EMERGENCY MEDICINE

## 2025-03-06 PROCEDURE — 99232 SBSQ HOSP IP/OBS MODERATE 35: CPT | Performed by: INTERNAL MEDICINE

## 2025-03-06 PROCEDURE — A9270 NON-COVERED ITEM OR SERVICE: HCPCS | Performed by: STUDENT IN AN ORGANIZED HEALTH CARE EDUCATION/TRAINING PROGRAM

## 2025-03-06 PROCEDURE — 97535 SELF CARE MNGMENT TRAINING: CPT | Mod: CQ

## 2025-03-06 PROCEDURE — 770001 HCHG ROOM/CARE - MED/SURG/GYN PRIV*

## 2025-03-06 RX ADMIN — GABAPENTIN 600 MG: 300 CAPSULE ORAL at 12:26

## 2025-03-06 RX ADMIN — GABAPENTIN 600 MG: 300 CAPSULE ORAL at 16:07

## 2025-03-06 RX ADMIN — SITAGLIPTIN 100 MG: 100 TABLET, FILM COATED ORAL at 05:32

## 2025-03-06 RX ADMIN — OMEPRAZOLE 40 MG: 20 CAPSULE, DELAYED RELEASE ORAL at 05:32

## 2025-03-06 RX ADMIN — PIOGLITAZONE 30 MG: 30 TABLET ORAL at 05:31

## 2025-03-06 RX ADMIN — MIDODRINE HYDROCHLORIDE 5 MG: 5 TABLET ORAL at 16:07

## 2025-03-06 RX ADMIN — METHOCARBAMOL TABLETS 750 MG: 750 TABLET, COATED ORAL at 20:56

## 2025-03-06 RX ADMIN — LIDOCAINE 2 PATCH: 4 PATCH TOPICAL at 12:27

## 2025-03-06 RX ADMIN — METHYLPHENIDATE HYDROCHLORIDE 5 MG: 5 TABLET ORAL at 08:53

## 2025-03-06 RX ADMIN — VENLAFAXINE HYDROCHLORIDE 225 MG: 75 CAPSULE, EXTENDED RELEASE ORAL at 08:53

## 2025-03-06 RX ADMIN — METHOCARBAMOL TABLETS 750 MG: 750 TABLET, COATED ORAL at 16:07

## 2025-03-06 RX ADMIN — MELOXICAM 15 MG: 7.5 TABLET ORAL at 05:31

## 2025-03-06 RX ADMIN — GABAPENTIN 600 MG: 300 CAPSULE ORAL at 05:31

## 2025-03-06 RX ADMIN — MIDODRINE HYDROCHLORIDE 5 MG: 5 TABLET ORAL at 08:53

## 2025-03-06 RX ADMIN — METHOCARBAMOL TABLETS 750 MG: 750 TABLET, COATED ORAL at 08:53

## 2025-03-06 RX ADMIN — CALCIUM POLYCARBOPHIL 625 MG: 625 TABLET, FILM COATED ORAL at 12:32

## 2025-03-06 RX ADMIN — CALCIUM POLYCARBOPHIL 625 MG: 625 TABLET, FILM COATED ORAL at 16:07

## 2025-03-06 RX ADMIN — ENOXAPARIN SODIUM 40 MG: 100 INJECTION SUBCUTANEOUS at 16:07

## 2025-03-06 RX ADMIN — Medication 10 MG: at 20:56

## 2025-03-06 RX ADMIN — METHOCARBAMOL TABLETS 750 MG: 750 TABLET, COATED ORAL at 12:26

## 2025-03-06 RX ADMIN — CALCIUM POLYCARBOPHIL 625 MG: 625 TABLET, FILM COATED ORAL at 08:53

## 2025-03-06 RX ADMIN — Medication 1000 UNITS: at 05:32

## 2025-03-06 RX ADMIN — GLIPIZIDE 10 MG: 5 TABLET ORAL at 08:53

## 2025-03-06 ASSESSMENT — PAIN DESCRIPTION - PAIN TYPE
TYPE: ACUTE PAIN

## 2025-03-06 NOTE — DISCHARGE PLANNING
Medical Social Work  SW spoke to patient about contacting his friend David/MYRON calling him to verify.  Patient stated while he has spoken to David does not want SW calling.  As it might mess up him taking him in.  Patient stated that he wants to leave/go to his home get some things grab is dog a drive to [place].  SW stated that patient's attending has concerns with him driving.  SW suggested if patient is adamant about going to his  home and then to [place].  Maybe he needs to spend some of his money and pay for transportation.  Patient stated he needs to move his car and doesn't understand why his doctor is worried about him driving.    Voalte to attending with above information

## 2025-03-06 NOTE — PROGRESS NOTES
Hospital Medicine Daily Progress Note    Date of Service  3/6/2025    Chief Complaint  Fall, back pain    Hospital Course  Christoph Taylor is a 59 y.o. male with poorly controlled type 2 diabetes mellitus, failure to thrive, history of prior suicidal ideations secondary to major depressive disorder, chronic back pain with history of multiple laminectomies and fractures, recent admission for acute on chronic back pain with MRI of the spine done which showed stable findings and neurosurgery recommending nonoperative management, was recently treated for pneumonia and at that time he was declined by SNF's and discharged to home with home health services and in-home caregiver.  Spine imaging were repeated and again showed stable findings.  He expressed to Suicidal ideation and was initially placed on legal hold which was ultimately discontinued by psychiatry team.  He was placed on Ritalin and Effexor with good effect.  He briefly had Crowley catheter for urinary retention which was since discontinued.  He was placed on tamsulosin and doxazosin which were eventually stopped due to orthostatic hypotension.  Patient was placed on midodrine.  He was able to urinate spontaneously with no further urinary retention.  He was evaluated by PT/OT who recommended postacute placement.  Discharge planning was again pursued and sent out SNF/group home referrals, noting that patient may need long-term care placement.     Interval Problem Update  3/6/2025 - I reviewed the patient's chart. There were no significant overnight events. Remains hemodynamically stable and afebrile. Stable on RA.  He was seen by psychiatry and recommended decreasing dose of Ritalin with plan to taper off since Effexor is maximized and with has improved.  Pending QuantiFERON gold TB test.    > I have personally seen and examined the patient today.  He has no complaints today.  Denies any pain.  No nausea or vomiting.  Tolerated oral diet.  He states he is not  going to a group home and his plan is still to moved to [facility] to live with a friend there.  He states that his friend and the friend's wife is now amenable with him moving in.    I personally reviewed all lab results mentioned above. Prior medical records from this institution and outside facilities were independently reviewed as noted. I also personally reviewed all ER physician and consultant recommendations and plans as documented above. History was independently obtained by myself. I have discussed this patient's plan of care and discharge plan at IDT rounds today with Case Management, Nursing, Nursing leadership, and other members of the IDT team.    Consultants/Specialty  psychiatry    Code Status  Full Code    Disposition  The patient is medically cleared for discharge to home or a post-acute facility.      Pursuing group home placement.    I have placed the appropriate orders for post-discharge needs.    Review of Systems  ROS     Pertinent positives/negatives as mentioned above.     A complete review of systems was personally done by me. All other systems were negative.       Physical Exam  Temp:  [36.6 °C (97.9 °F)-36.9 °C (98.5 °F)] 36.9 °C (98.5 °F)  Pulse:  [79-98] 91  Resp:  [16-17] 17  BP: (111-135)/(73-84) 135/73  SpO2:  [93 %-95 %] 93 %    Physical Exam  Vitals and nursing note reviewed. Exam conducted with a chaperone present.   Constitutional:       General: He is not in acute distress.     Appearance: Normal appearance. He is not ill-appearing, toxic-appearing or diaphoretic.   HENT:      Head: Normocephalic and atraumatic.      Ears:      Comments: +Presbycusis     Nose: Nose normal.      Mouth/Throat:      Mouth: Mucous membranes are moist.      Pharynx: No oropharyngeal exudate.   Eyes:      General: No scleral icterus.     Extraocular Movements: Extraocular movements intact.      Conjunctiva/sclera: Conjunctivae normal.      Pupils: Pupils are equal, round, and reactive to  light.   Cardiovascular:      Rate and Rhythm: Normal rate and regular rhythm.      Heart sounds: Normal heart sounds. No murmur heard.     No gallop.   Pulmonary:      Effort: Pulmonary effort is normal. No respiratory distress.      Breath sounds: Normal breath sounds. No stridor. No wheezing, rhonchi or rales.   Chest:      Chest wall: No tenderness.   Abdominal:      General: Bowel sounds are normal. There is no distension.      Palpations: Abdomen is soft. There is no mass.      Tenderness: There is no abdominal tenderness. There is no guarding or rebound.   Musculoskeletal:         General: No swelling. Normal range of motion.      Cervical back: Normal range of motion and neck supple.   Lymphadenopathy:      Cervical: No cervical adenopathy.   Skin:     General: Skin is warm and dry.      Coloration: Skin is not jaundiced.      Findings: No rash.   Neurological:      General: No focal deficit present.      Mental Status: He is alert and oriented to person, place, and time.      Cranial Nerves: No cranial nerve deficit.      Comments: Conversant, answers questions appropriately   Psychiatric:         Attention and Perception: Attention and perception normal.         Mood and Affect: Mood normal. Affect is blunt.         Speech: Speech normal.         Behavior: Behavior normal. Behavior is cooperative.         Thought Content: Thought content normal.         Cognition and Memory: Cognition and memory normal.         Judgment: Judgment normal.       I have performed the physical examination today 3/6/2025.  In review of yesterday's note, there are no new changes except as documented above.      Fluids    Intake/Output Summary (Last 24 hours) at 3/6/2025 1111  Last data filed at 3/6/2025 0853  Gross per 24 hour   Intake 360 ml   Output 800 ml   Net -440 ml      Laboratory                            Imaging  DX-SHOULDER 2+ RIGHT   Final Result      1.  No evidence of right shoulder fracture or dislocation.      2.   Moderate osteoarthritic changes of the right AC joint      [id number] WITH   Final Result      1.  Unremarkable orbits.   2.  No facial soft tissue swelling or mass.   3.  Paranasal sinuses are clear.      MR-THORACIC SPINE-W/O   Final Result      1.  Moderate chronic compression deformity at T12.   2.  No acute fracture.   3.  Multifocal degenerative disease as described above.   4.  There has been no significant interval change.      MR-LUMBAR SPINE-W/O   Final Result      1.  Postsurgical and degenerative changes as described above.   2.  Moderate chronic compression deformity at T12.   3.  No acute abnormality.   4.  There has been no significant interval change.      CT-LSPINE W/O PLUS RECONS   Final Result      1. No acute fracture or subluxation involving the lumbar spine.   2. Stable anterior wedge compression deformity of the superior endplate of T12.   3. Stable degenerative grade 1 anterolisthesis of L4 with respect to L3.   4. New postsurgical changes of posterior lateral interbody fusion at L4-5.   5. Horseshoe kidney.   6. Circumferential bladder wall thickening.      CT-TSPINE W/O PLUS RECONS   Final Result      Chronic appearing fractures without a definite acute fracture or change in alignment.      CT-CSPINE WITHOUT PLUS RECONS   Final Result      1. No acute osseous injury of the cervical spine.   2. Stable postsurgical changes of ACDF from C4 through C7.   3. Stable multilevel cervical spondylosis.      CT-HEAD W/O   Final Result      1. Age-related central and cortical atrophy.   2. No acute intracranial abnormality.                    Assessment/Plan  * Urinary retention- (present on admission)  Assessment & Plan  Improved  Patient with acute urinary retention after fall, given extensive back history concern for potential cord abnormality.    Unable to tolerate A1AT due to orthostasis  Continue to monitor for urinary retention.  Continue bladder scan prn, straight cath for  PVR>400cc    Chronic midline back pain- (present on admission)  Assessment & Plan  MRI demonstrates chronic compression fracture and degenerative changes - nonoperative management  Opiates contraindicated due to urinary retention, chronic pain, and mood DO  Multimodal regimen:  Continue gabapentin 600 mg TID  Off steroids.  Resume scheduled Mobic 15 mg daily.  Continue SNRI - venlafaxine  Continue scheduled Robaxin.  Continue Lidoderm topical therapy  Warm compress    Orthostatic hypotension  Assessment & Plan  Recurrent  Exacerbated by high-dose tamsulosin, not improved with doxazosin  Improving, have stopped BPH medications thankfully he is urinating well with no urinary retention.  Cosyntropin stimulation test negative 2/23 for AI  Continue to monitor.  Orthostatic VS and IVF boluses as needed  Patient euvolemic, continue midodrine 5 mg TID    Frequent falls- (present on admission)  Assessment & Plan  -Patient with frequent falls due to chronic and progressively worsening bilateral lower extremity weakness with multiple different spinal surgeries.  Patient does report chronic bowel incontinence and recently has developed urinary retention after previous fall with concern for potential cord compression/abnormality.  Lower extremity weakness 4+ out of 5, likely generalized, however asked by ER provider to admit for MRI evaluation  -MRI of thoracic and lumbar spine show chronic compression fracture at T12, degenerative changes.  No acute fracture  Neurosurgery has recommended for outpatient follow-up and possible epidural injections  - PT/OT recommend post-acute.  Now off legal hold.  I personally discussed with DAVID/MYRON. Looking at group home placement but he states he is not going to a group home and his plan is still to moved to [facility] to live with a friend there.  He states that his friend and the friend's wife is now amenable with him moving in.  QuantiFERON gold TB test pending.    Suicidal  ideation- (present on admission)  Assessment & Plan  Legal hold lifted 3/2/25 by psychiatry team  Stable on ritalin and effexor    Acute hypoxic respiratory failure (HCC)- (present on admission)  Assessment & Plan  Resolved    Proliferative diabetic retinopathy of both eyes without macular edema associated with type 2 diabetes mellitus (HCC)- (present on admission)  Assessment & Plan  Complains of worsening vision  Patient Previous saw Soraida Bustamante, OD on 10/5/2024.  Exam is uploaded into the media tab.  At that time his right eye visual acuity was 20/40-2, left eye 20/150-1.  Referral was placed to [facility] for diabetic retinopathy and cataracts but patient has been unable to follow-up.  Patient complained of progressively worsening vision more acutely over the past few days.  He had a CT orbits on 2/16 which was unremarkable  Follow up with outpatient ophthalmologist    Uncontrolled type 2 diabetes mellitus with hyperglycemia (HCC)- (present on admission)  Assessment & Plan  A1c 9.8  Insulin discontinued due to difficulty managing with impaired dexterity / vision and risk of hypoglycemia  Initiate additional pharmacotherapy (SGLT2, GLP1, etc) after discharge       VTE prophylaxis: Lovenox    My total time spent caring for the patient on the day of the encounter was 37 minutes. This does not include time spent on separately billable procedures/tests.

## 2025-03-06 NOTE — CARE PLAN
The patient is Stable - Low risk of patient condition declining or worsening    Shift Goals  Clinical Goals: safety  Patient Goals: register car, see dog, discharge  Family Goals: not present    Progress made toward(s) clinical / shift goals:    Problem: Knowledge Deficit - Standard  Goal: Patient and family/care givers will demonstrate understanding of plan of care, disease process/condition, diagnostic tests and medications  Outcome: Progressing     Problem: Provide Safe Environment  Goal: Suicide environmental safety, protocols, policies, and practices will be implemented  Outcome: Progressing     Problem: Psychosocial  Goal: Patient's ability to identify and develop effective coping behaviors will improve  Outcome: Progressing     Problem: Safety  Goal: Will remain free from injury  Outcome: Progressing

## 2025-03-06 NOTE — DISCHARGE PLANNING
Medical Social Work  SW spoke to patient, who stated that things have changed again.  That his friend David can now take him, will be here Tuesday to get him and his dog.  SW asked why Tuesday, patient stated soonest David could come up.  SW asked if transport could be arranged sooner would David take him tomorrow.  Patient stated his dog needs to go.  SW stated that follow up with the transport provider to see if this is an option or not.  SW stated that contact needs to be made with David to verify that he is indeed taking patient and to verify his address.    Patient stated he will call David in a bit, SW stated will be up later today to speak to patient.

## 2025-03-06 NOTE — CARE PLAN
Problem: Pain - Standard  Goal: Alleviation of pain or a reduction in pain to the patient’s comfort goal  Outcome: Progressing     Problem: Knowledge Deficit - Standard  Goal: Patient and family/care givers will demonstrate understanding of plan of care, disease process/condition, diagnostic tests and medications  Outcome: Progressing       The patient is Stable - Low risk of patient condition declining or worsening    Shift Goals  Clinical Goals: Pain control, safety  Patient Goals: Pain control, comfort  Family Goals: not present    Progress made toward(s) clinical / shift goals:  Taught pt 0-10 pain scale and non-pharmacological method of pain management, encouraged to verbalize when in pain. Administered PRN pain meds as needed. Bed locked and in lowest position. Bed alarm on. Virtual care companion, safety and fall precautions in place. Hourly rounding. Call light and belongings within reach.     Patient is not progressing towards the following goals:

## 2025-03-07 ENCOUNTER — PHARMACY VISIT (OUTPATIENT)
Dept: PHARMACY | Facility: MEDICAL CENTER | Age: 60
End: 2025-03-07
Payer: COMMERCIAL

## 2025-03-07 VITALS
RESPIRATION RATE: 17 BRPM | BODY MASS INDEX: 22.1 KG/M2 | WEIGHT: 163.14 LBS | OXYGEN SATURATION: 92 % | SYSTOLIC BLOOD PRESSURE: 121 MMHG | TEMPERATURE: 97.3 F | DIASTOLIC BLOOD PRESSURE: 82 MMHG | HEART RATE: 92 BPM | HEIGHT: 72 IN

## 2025-03-07 PROCEDURE — 700102 HCHG RX REV CODE 250 W/ 637 OVERRIDE(OP)

## 2025-03-07 PROCEDURE — 700102 HCHG RX REV CODE 250 W/ 637 OVERRIDE(OP): Performed by: INTERNAL MEDICINE

## 2025-03-07 PROCEDURE — A9270 NON-COVERED ITEM OR SERVICE: HCPCS | Performed by: STUDENT IN AN ORGANIZED HEALTH CARE EDUCATION/TRAINING PROGRAM

## 2025-03-07 PROCEDURE — A9270 NON-COVERED ITEM OR SERVICE: HCPCS

## 2025-03-07 PROCEDURE — 99239 HOSP IP/OBS DSCHRG MGMT >30: CPT | Performed by: INTERNAL MEDICINE

## 2025-03-07 PROCEDURE — A9270 NON-COVERED ITEM OR SERVICE: HCPCS | Performed by: INTERNAL MEDICINE

## 2025-03-07 PROCEDURE — A9270 NON-COVERED ITEM OR SERVICE: HCPCS | Performed by: EMERGENCY MEDICINE

## 2025-03-07 PROCEDURE — 700102 HCHG RX REV CODE 250 W/ 637 OVERRIDE(OP): Performed by: EMERGENCY MEDICINE

## 2025-03-07 PROCEDURE — RXMED WILLOW AMBULATORY MEDICATION CHARGE: Performed by: INTERNAL MEDICINE

## 2025-03-07 PROCEDURE — 700102 HCHG RX REV CODE 250 W/ 637 OVERRIDE(OP): Performed by: STUDENT IN AN ORGANIZED HEALTH CARE EDUCATION/TRAINING PROGRAM

## 2025-03-07 RX ORDER — METHYLPHENIDATE HYDROCHLORIDE 5 MG/1
5 TABLET ORAL
Qty: 30 TABLET | Refills: 0 | Status: SHIPPED | OUTPATIENT
Start: 2025-03-08 | End: 2025-04-07

## 2025-03-07 RX ADMIN — VENLAFAXINE HYDROCHLORIDE 225 MG: 75 CAPSULE, EXTENDED RELEASE ORAL at 08:37

## 2025-03-07 RX ADMIN — PIOGLITAZONE 30 MG: 30 TABLET ORAL at 05:03

## 2025-03-07 RX ADMIN — GABAPENTIN 600 MG: 300 CAPSULE ORAL at 05:03

## 2025-03-07 RX ADMIN — GLIPIZIDE 10 MG: 5 TABLET ORAL at 08:37

## 2025-03-07 RX ADMIN — CALCIUM POLYCARBOPHIL 625 MG: 625 TABLET, FILM COATED ORAL at 08:37

## 2025-03-07 RX ADMIN — METHYLPHENIDATE HYDROCHLORIDE 5 MG: 5 TABLET ORAL at 08:37

## 2025-03-07 RX ADMIN — MELOXICAM 15 MG: 7.5 TABLET ORAL at 05:04

## 2025-03-07 RX ADMIN — Medication 1000 UNITS: at 05:04

## 2025-03-07 RX ADMIN — SITAGLIPTIN 100 MG: 100 TABLET, FILM COATED ORAL at 05:04

## 2025-03-07 RX ADMIN — OMEPRAZOLE 40 MG: 20 CAPSULE, DELAYED RELEASE ORAL at 05:03

## 2025-03-07 RX ADMIN — MIDODRINE HYDROCHLORIDE 5 MG: 5 TABLET ORAL at 08:42

## 2025-03-07 NOTE — PROGRESS NOTES
Hospital Medicine Daily Progress Note    Date of Service  3/7/2025    Chief Complaint  Fall, back pain    Hospital Course  Christoph Taylor is a 59 y.o. male with poorly controlled type 2 diabetes mellitus, failure to thrive, history of prior suicidal ideations secondary to major depressive disorder, chronic back pain with history of multiple laminectomies and fractures, recent admission for acute on chronic back pain with MRI of the spine done which showed stable findings and neurosurgery recommending nonoperative management, was recently treated for pneumonia and at that time he was declined by SNF's and discharged to home with home health services and in-home caregiver.  Spine imaging were repeated and again showed stable findings.  He expressed to Suicidal ideation and was initially placed on legal hold which was ultimately discontinued by psychiatry team.  He was placed on Ritalin and Effexor with good effect.  He briefly had Crowley catheter for urinary retention which was since discontinued.  He was placed on tamsulosin and doxazosin which were eventually stopped due to orthostatic hypotension.  Patient was placed on midodrine.  He was able to urinate spontaneously with no further urinary retention.  He was followed by psychiatry and eventually recommended decreasing dose of Ritalin with plan to taper off since Effexor is maximized and with has improved. He was evaluated by PT/OT who recommended postacute placement.  Discharge planning was again pursued and sent out SNF/group home referrals, noting that patient may need long-term care placement.     Interval Problem Update  3/7/2025 - I reviewed the patient's chart today. Uneventful night. VSS. Afebrile. Saturating well on RA.  No new labs.    > I have personally seen and examined the patient today.  He is in good spirits today.  Denies any pain.  Not short of breath.  Had good appetite.  He wants to drive by himself to [place], I told him that he would  need psychiatry clearance for me to be able to discharge him on his own, he verbalized understanding.  I personally reach out to psychiatry, who will see him today.    I personally reviewed all lab results mentioned above. Prior medical records from this institution and outside facilities were independently reviewed as noted. I also personally reviewed all ER physician and consultant recommendations and plans as documented above. History was independently obtained by myself. I have discussed this patient's plan of care and discharge plan at IDT rounds today with Case Management, Nursing, Nursing leadership, and other members of the IDT team.    Consultants/Specialty  psychiatry    Code Status  Full Code    Disposition  The patient is medically cleared for discharge to home or a post-acute facility.      Plan to discharge to friend's house in [place], [place].  Will need psychiatry clearance if can drive by himself.    I have placed the appropriate orders for post-discharge needs.    Review of Systems  ROS     Pertinent positives/negatives as mentioned above.     A complete review of systems was personally done by me. All other systems were negative.       Physical Exam  Temp:  [36.3 °C (97.3 °F)-36.6 °C (97.9 °F)] 36.3 °C (97.3 °F)  Pulse:  [77-93] 92  Resp:  [17-18] 17  BP: (116-135)/(70-82) 121/82  SpO2:  [92 %-95 %] 92 %    Physical Exam  Vitals and nursing note reviewed. Exam conducted with a chaperone present.   Constitutional:       General: He is not in acute distress.     Appearance: Normal appearance. He is not ill-appearing, toxic-appearing or diaphoretic.   HENT:      Head: Normocephalic and atraumatic.      Ears:      Comments: +Presbycusis     Nose: Nose normal.      Mouth/Throat:      Mouth: Mucous membranes are moist.      Pharynx: No oropharyngeal exudate.   Eyes:      General: No scleral icterus.     Extraocular Movements: Extraocular movements intact.      Conjunctiva/sclera: Conjunctivae normal.       Pupils: Pupils are equal, round, and reactive to light.   Cardiovascular:      Rate and Rhythm: Normal rate and regular rhythm.      Heart sounds: Normal heart sounds. No murmur heard.     No gallop.   Pulmonary:      Effort: Pulmonary effort is normal. No respiratory distress.      Breath sounds: Normal breath sounds. No stridor. No wheezing, rhonchi or rales.   Chest:      Chest wall: No tenderness.   Abdominal:      General: Bowel sounds are normal. There is no distension.      Palpations: Abdomen is soft. There is no mass.      Tenderness: There is no abdominal tenderness. There is no guarding or rebound.   Musculoskeletal:         General: No swelling. Normal range of motion.      Cervical back: Normal range of motion and neck supple.   Lymphadenopathy:      Cervical: No cervical adenopathy.   Skin:     General: Skin is warm and dry.      Coloration: Skin is not jaundiced.      Findings: No rash.   Neurological:      General: No focal deficit present.      Mental Status: He is alert and oriented to person, place, and time.      Cranial Nerves: No cranial nerve deficit.      Comments: Conversant, answers questions appropriately   Psychiatric:         Attention and Perception: Attention and perception normal.         Mood and Affect: Mood normal. Affect is blunt.         Speech: Speech normal.         Behavior: Behavior normal. Behavior is cooperative.         Thought Content: Thought content normal.         Cognition and Memory: Cognition and memory normal.         Judgment: Judgment normal.       I have performed the physical examination today 3/7/2025.  In review of yesterday's note, there are no new changes except as documented above.      Fluids    Intake/Output Summary (Last 24 hours) at 3/7/2025 1128  Last data filed at 3/7/2025 0841  Gross per 24 hour   Intake --   Output 1600 ml   Net -1600 ml      Laboratory                            Imaging  DX-SHOULDER 2+ RIGHT   Final Result      1.  No evidence of  right shoulder fracture or dislocation.      2.  Moderate osteoarthritic changes of the right AC joint      [id number] WITH   Final Result      1.  Unremarkable orbits.   2.  No facial soft tissue swelling or mass.   3.  Paranasal sinuses are clear.      MR-THORACIC SPINE-W/O   Final Result      1.  Moderate chronic compression deformity at T12.   2.  No acute fracture.   3.  Multifocal degenerative disease as described above.   4.  There has been no significant interval change.      MR-LUMBAR SPINE-W/O   Final Result      1.  Postsurgical and degenerative changes as described above.   2.  Moderate chronic compression deformity at T12.   3.  No acute abnormality.   4.  There has been no significant interval change.      CT-LSPINE W/O PLUS RECONS   Final Result      1. No acute fracture or subluxation involving the lumbar spine.   2. Stable anterior wedge compression deformity of the superior endplate of T12.   3. Stable degenerative grade 1 anterolisthesis of L4 with respect to L3.   4. New postsurgical changes of posterior lateral interbody fusion at L4-5.   5. Horseshoe kidney.   6. Circumferential bladder wall thickening.      CT-TSPINE W/O PLUS RECONS   Final Result      Chronic appearing fractures without a definite acute fracture or change in alignment.      CT-CSPINE WITHOUT PLUS RECONS   Final Result      1. No acute osseous injury of the cervical spine.   2. Stable postsurgical changes of ACDF from C4 through C7.   3. Stable multilevel cervical spondylosis.      CT-HEAD W/O   Final Result      1. Age-related central and cortical atrophy.   2. No acute intracranial abnormality.                    Assessment/Plan  * Urinary retention- (present on admission)  Assessment & Plan  Improved  Patient with acute urinary retention after fall, given extensive back history concern for potential cord abnormality.    Unable to tolerate A1AT due to orthostasis  Continue to monitor for urinary retention.  Continue  bladder scan prn, straight cath for PVR>400cc    Chronic midline back pain- (present on admission)  Assessment & Plan  MRI demonstrates chronic compression fracture and degenerative changes - nonoperative management  Opiates contraindicated due to urinary retention, chronic pain, and mood DO  Multimodal regimen:  Continue gabapentin 600 mg TID  Off steroids.  Resume scheduled Mobic 15 mg daily.  Continue SNRI - venlafaxine  Continue scheduled Robaxin.  Continue Lidoderm topical therapy  Warm compress    Orthostatic hypotension  Assessment & Plan  Recurrent  Exacerbated by high-dose tamsulosin, not improved with doxazosin  Improving, have stopped BPH medications thankfully he is urinating well with no urinary retention.  Cosyntropin stimulation test negative 2/23 for AI  Continue to monitor.  Orthostatic VS and IVF boluses as needed  Patient euvolemic, continue midodrine 5 mg TID    Frequent falls- (present on admission)  Assessment & Plan  -Patient with frequent falls due to chronic and progressively worsening bilateral lower extremity weakness with multiple different spinal surgeries.  Patient does report chronic bowel incontinence and recently has developed urinary retention after previous fall with concern for potential cord compression/abnormality.  Lower extremity weakness 4+ out of 5, likely generalized, however asked by ER provider to admit for MRI evaluation  -MRI of thoracic and lumbar spine show chronic compression fracture at T12, degenerative changes.  No acute fracture  Neurosurgery has recommended for outpatient follow-up and possible epidural injections  - PT/OT recommend post-acute.  Now off legal hold.  I personally discussed with CM/MYRON. plan again is to discharge to his friend's house in [facility].  Patient states that his friend and the friend's wife is now amenable with him moving in.    -Psychology initially wrote that he cannot be discharged on his own and needs to ensure that friend  is physically present to pick him up. He wants to drive by himself to [place], I told him that he would need psychiatry clearance for me to be able to discharge him on his own, he verbalized understanding.  I personally reach out to psychiatry, who will see him today.    Suicidal ideation- (present on admission)  Assessment & Plan  Legal hold lifted 3/2/25 by psychiatry team  Stable on ritalin and effexor    Acute hypoxic respiratory failure (HCC)- (present on admission)  Assessment & Plan  Resolved    Proliferative diabetic retinopathy of both eyes without macular edema associated with type 2 diabetes mellitus (HCC)- (present on admission)  Assessment & Plan  Complains of worsening vision  Patient Previous saw Soraida Bustamante, OD on 10/5/2024.  Exam is uploaded into the media tab.  At that time his right eye visual acuity was 20/40-2, left eye 20/150-1.  Referral was placed to [facility] for diabetic retinopathy and cataracts but patient has been unable to follow-up.  Patient complained of progressively worsening vision more acutely over the past few days.  He had a CT orbits on 2/16 which was unremarkable  Follow up with outpatient ophthalmologist    Uncontrolled type 2 diabetes mellitus with hyperglycemia (HCC)- (present on admission)  Assessment & Plan  A1c 9.8  Insulin discontinued due to difficulty managing with impaired dexterity / vision and risk of hypoglycemia  Initiate additional pharmacotherapy (SGLT2, GLP1, etc) after discharge       VTE prophylaxis: Lovenox    My total time spent caring for the patient on the day of the encounter was 38 minutes. This does not include time spent on separately billable procedures/tests.

## 2025-03-07 NOTE — CARE PLAN
The patient is Stable - Low risk of patient condition declining or worsening    Shift Goals  Clinical Goals: Pain control, rest, mobility, safety  Patient Goals: Pain control, sleep  Family Goals: N/A    Progress made toward(s) clinical / shift goals:        Problem: Pain - Standard  Goal: Alleviation of pain or a reduction in pain to the patient’s comfort goal  Outcome: Progressing     Problem: Knowledge Deficit - Standard  Goal: Patient and family/care givers will demonstrate understanding of plan of care, disease process/condition, diagnostic tests and medications  Outcome: Progressing     Problem: Provide Safe Environment  Goal: Suicide environmental safety, protocols, policies, and practices will be implemented  Outcome: Progressing     Problem: Psychosocial  Goal: Patient's ability to identify and develop effective coping behaviors will improve  Outcome: Progressing  Goal: Patient's ability to identify and utilize available support systems will improve  Outcome: Progressing     Problem: Skin Integrity  Goal: Skin integrity is maintained or improved  Outcome: Progressing     Problem: Infection - Standard  Goal: Patient will remain free from infection  Outcome: Progressing     Problem: Mobility  Goal: Risk for activity intolerance will decrease  Outcome: Progressing     Problem: Safety  Goal: Will remain free from injury  Outcome: Progressing  Goal: Will remain free from falls  Outcome: Progressing       Patient is not progressing towards the following goals:

## 2025-03-07 NOTE — DISCHARGE PLANNING
1230: Writer obtained verbal choice for DME/FWW: 1. [facility] due to patient's difficult signing. FWW delivered to bedside by traction.     -Taxi voucher provided to bedside RN from [address] [place] [address] Beau MARQUEZ to [address] [place] [address], [address] [place] [address]. Patient verbalized ability to ascend stairs into his home.     No further needs from this RN CM prior to discharge.

## 2025-03-07 NOTE — CARE PLAN
The patient is Stable - Low risk of patient condition declining or worsening    Shift Goals  Clinical Goals: comfort, mobility, d/c  Patient Goals: d/c  Family Goals: n/a    Progress made toward(s) clinical / shift goals:       Problem: Pain - Standard  Goal: Alleviation of pain or a reduction in pain to the patient’s comfort goal  Description: Target End Date:  Prior to discharge or change in level of care    Document on Vitals flowsheet    1.  Document pain using the appropriate pain scale per order or unit policy  2.  Educate and implement non-pharmacologic comfort measures (i.e. relaxation, distraction, massage, cold/heat therapy, etc.)  3.  Pain management medications as ordered  4.  Reassess pain after pain med administration per policy  5.  If opiods administered assess patient's response to pain medication is appropriate per POSS sedation scale  6.  Follow pain management plan developed in collaboration with patient and interdisciplinary team (including palliative care or pain specialists if applicable)  Outcome: Progressing     Problem: Knowledge Deficit - Standard  Goal: Patient and family/care givers will demonstrate understanding of plan of care, disease process/condition, diagnostic tests and medications  Description: Target End Date:  1-3 days or as soon as patient condition allows    Document in Patient Education    1.  Patient and family/caregiver oriented to unit, equipment, visitation policy and means for communicating concern  2.  Complete/review Learning Assessment  3.  Assess knowledge level of disease process/condition, treatment plan, diagnostic tests and medications  4.  Explain disease process/condition, treatment plan, diagnostic tests and medications  Outcome: Progressing     Problem: Provide Safe Environment  Goal: Suicide environmental safety, protocols, policies, and practices will be implemented  Description: Target End Date:  resolve day 1    1.  Remove objects or personal belongings  that may cause harm or injury to self or others  2.  Dietary tray modifications (paperware)  3.  Provide a safe environment  4.  Render close patient supervision by sustaining observation or awareness of the patient at all times  Outcome: Progressing     Problem: Psychosocial  Goal: Patient's ability to identify and develop effective coping behaviors will improve  Description: Target End Date:  1 to 3 days    1.  Present opportunities for the patient to express thoughts, and feelings in a nonjudgmental environment  2.  Help the patient with problem-solving in a constructive manner.  3.  Educate the patient on cognitive-behavioral self-management responses to suicidal thoughts.  4.  Introduce the use of self-expression methods to manage suicidal feelings  5.  Provide emotional support  6.  Encourage identification of positive aspects of self  Outcome: Progressing  Goal: Patient's ability to identify and utilize available support systems will improve  Description: Target End Date:  1 to 3 days    1.  Help patient identify available resources and support systems  2.  Collaborate with interdisciplinary team  3.  Collaborate with patient, family/caregiver and other support systems  Outcome: Progressing     Problem: Skin Integrity  Goal: Skin integrity is maintained or improved  Description: Target End Date:  Prior to discharge or change in level of care    Document interventions on Skin Risk/Oscar flowsheet groups and corresponding LDA    1.  Assess and monitor skin integrity, appearance and/or temperature  2.  Assess risk factors for impaired skin integrity and/or pressures ulcers  3.  Implement precautions to protect skin integrity in collaboration with interdisciplinary team  4.  Implement pressure ulcer prevention protocol if at risk for skin breakdown  5.  Confirm wound care consult if at risk for skin breakdown  6.  Ensure patient use of pressure relieving devices  (Low air loss bed, waffle overlay, heel  protectors, ROHO cushion, etc)  Outcome: Progressing     Problem: Infection - Standard  Goal: Patient will remain free from infection  Description: Target End Date:  Prior to discharge or change in level of care    1.  Utilize Standard Precautions at all times to reduce the risk of transmission of microorganisms from both recognized and  unrecognized sources of infection  2.  Infection prevention handouts provided (general/device/diagnosis specific) and documented in Patient Education  3.  Educate patient and family/caregiver on isolation precautions if applicable  Outcome: Progressing     Problem: Mobility  Goal: Risk for activity intolerance will decrease  Outcome: Progressing     Problem: Safety  Goal: Will remain free from injury  Outcome: Progressing  Goal: Will remain free from falls  Outcome: Progressing     Patient is not progressing towards the following goals:

## 2025-03-07 NOTE — DISCHARGE SUMMARY
Discharge Summary    CHIEF COMPLAINT ON ADMISSION  Chief Complaint   Patient presents with    T-5000 FALL    Back Pain       Reason for Admission  ems     Admission Date  2/10/2025    CODE STATUS  Full Code    HPI & HOSPITAL COURSE  Christoph Taylor is a 59 y.o. male with poorly controlled type 2 diabetes mellitus, failure to thrive, history of prior suicidal ideations secondary to major depressive disorder, chronic back pain with history of multiple laminectomies and fractures, recent admission for acute on chronic back pain with MRI of the spine done which showed stable findings and neurosurgery recommending nonoperative management, was recently treated for pneumonia and at that time he was declined by SNF's and discharged to home with home health services and in-home caregiver.  Spine imaging were repeated and again showed stable findings.  He expressed suicidal ideation and was initially placed on legal hold which was ultimately discontinued by psychiatry team.  He was placed on Ritalin and Effexor with good effect.  He briefly had Crowley catheter for urinary retention which was since discontinued.  He was placed on tamsulosin and doxazosin which were eventually stopped due to orthostatic hypotension.  Patient was placed on midodrine.  He was able to urinate spontaneously with no further urinary retention.  He was followed by psychiatry and eventually recommended decreasing dose of Ritalin with plan to taper off since Effexor is maximized and with which he has improved. He was evaluated by PT/OT who recommended that he has no further skilled therapy needs after discharge from the hospital.   Discharge planning was again pursued, and he was able to find a friend who he can move in with in [place] closer to his children.  Psychiatry has cleared him from psych standpoint and that he can discharge by himself to drive to [place].  He was given prescriptions via [facility] to beds program.    I have  personally seen and examined the patient on the day of discharge. With his clinical improvement, he was deemed ready to discharge from the hospital as he did not have any further hospitalization needs. Patient felt comfortable going home. The discharge plan was discussed with the patient, with which he was agreeable to.     Therefore, he is discharged in good and stable condition to home with close outpatient follow-up.    The patient met 2-midnight criteria for an inpatient stay at the time of discharge.    Discharge Date  3/7/2025      FOLLOW UP ITEMS POST DISCHARGE  -He will continue on once daily Ritalin, along with Effexor.  -Continue midodrine.  -Continue muscle relaxant and lidocaine patch, along with Mobic.  - Follow-up with PCP.   - counseled to seek immediate medical attention, or return to the ED for recurrent or worsening symptoms.      DISCHARGE DIAGNOSES  Principal Problem:    Urinary retention (POA: Yes)  Active Problems:    Acute hypoxic respiratory failure (HCC) (POA: Yes)    Suicidal ideation (POA: Yes)    Frequent falls (POA: Yes)    Orthostatic hypotension (POA: Clinically Undetermined)    Chronic midline back pain (POA: Yes)    Uncontrolled type 2 diabetes mellitus with hyperglycemia (HCC) (POA: Yes)    Proliferative diabetic retinopathy of both eyes without macular edema associated with type 2 diabetes mellitus (HCC) (POA: Yes)  Resolved Problems:    * No resolved hospital problems. *      FOLLOW UP  No future appointments.  Orin Darnell, FABBY.P.R.N.  [address] [id number] [phone]    Schedule an appointment as soon as possible for a visit   As needed    [facility], Emergency Dept  [address] [facility] [address] [phone]    If symptoms worsen      MEDICATIONS ON DISCHARGE     Medication List        START taking these medications        Instructions   Fiber 625 MG Tabs   Take 1 Tablet by mouth 3 times a day with meals.  Dose: 625  mg     lidocaine 4 % Ptch  Commonly known as: Asperflex   Place 2 Patches on the skin every 24 hours. wear for 12 hours and remove for 12 hours  Dose: 2 Patch     Melatonin 10 MG Tabs   Take 1 tablet by mouth every evening.  Dose: 10 mg     meloxicam 15 MG tablet  Commonly known as: Mobic   Take 1 Tablet by mouth every day.  Dose: 15 mg     methocarbamol 750 MG Tabs  Commonly known as: Robaxin   Take 1 Tablet by mouth 4 times a day.  Dose: 750 mg     methylphenidate 5 MG Tabs  Start taking on: March 8, 2025  Commonly known as: Ritalin   Take 1 Tablet by mouth every morning with breakfast for 30 days.  Dose: 5 mg     midodrine 5 MG Tabs  Commonly known as: Proamatine   Take 1 Tablet by mouth 3 times a day with meals.  Dose: 5 mg     vitamin D 1000 UNIT Tabs  Commonly known as: Cholecalciferol   Take 1 Tablet by mouth every day.  Dose: 1,000 Units            CHANGE how you take these medications        Instructions   gabapentin 300 MG Caps  What changed:   medication strength  how much to take  Commonly known as: Neurontin   Take 2 Capsules by mouth 3 times a day.  Dose: 600 mg     pioglitazone 30 MG Tabs  What changed: how much to take  Commonly known as: Actos   Take 1 Tablet by mouth every day.  Dose: 30 mg            CONTINUE taking these medications        Instructions   FreeStyle Barb 14 Day Sensor Misc   1 Each every 14 days.  Dose: 1 Each     glipiZIDE 10 MG Tabs  Commonly known as: Glucotrol   Take 1 Tablet by mouth every morning before breakfast.  Dose: 10 mg     omeprazole 40 MG delayed-release capsule  Commonly known as: PriLOSEC   Take 1 Capsule by mouth every day for 200 days.  Dose: 40 mg     SITagliptin 100 MG Tabs  Commonly known as: Januvia   Take 1 Tablet by mouth every day.  Dose: 100 mg     venlafaxine 75 MG Tabs  Commonly known as: Effexor   Take 1 Tablet by mouth every day.  Dose: 75 mg            STOP taking these medications      oxyCODONE immediate-release 5 MG Tabs  Commonly known as:  Roxicodone              Allergies  Allergies   Allergen Reactions    Ketamine Unspecified     aggressive       DIET  Orders Placed This Encounter   Procedures    Diet Order Diet: Consistent CHO (Diabetic); Tray Modifications (optional): Safety Tray - Plastic dishware, utensils unwrapped (Suicide Watch)     Standing Status:   Standing     Number of Occurrences:   1     Diet::   Consistent CHO (Diabetic) [4]     Tray Modifications (optional):   Safety Tray - Plastic dishware, utensils unwrapped (Suicide Watch)       ACTIVITY  As tolerated.  Weight bearing as tolerated    CONSULTATIONS  Psychiatry    PROCEDURES  As above    LABORATORY  Lab Results   Component Value Date    SODIUM 137 02/14/2025    POTASSIUM 4.8 02/14/2025    CHLORIDE 102 02/14/2025    CO2 28 02/14/2025    GLUCOSE 211 (H) 02/14/2025    BUN 23 (H) 02/14/2025    CREATININE 0.67 02/14/2025        Lab Results   Component Value Date    WBC 4.0 (L) 02/14/2025    HEMOGLOBIN 10.1 (L) 02/14/2025    HEMATOCRIT 32.5 (L) 02/14/2025    PLATELETCT 213 02/14/2025        Total time of the discharge process = 38 minutes.

## 2025-03-07 NOTE — DISCHARGE PLANNING
Medical Social Work  SW spoke to patient about his discharge plan.  Patient continues to want to leave today, drive himself to [facility].  Patient stated if his doctor does not allow he to leave he will leave on his own.  Patient is requesting he be provided with his medications.    Voalte to Dr Jorgensen with an update.

## 2025-03-07 NOTE — PROGRESS NOTES
Discharge orders received.  Patient arrived to the discharge lounge.  PIV removed by floor RN. Meds to beds medications verified by discharge RN, bag of medications given to patient.  Instructions given, medications reviewed and general discharge education provided to patient.  Follow up appointments discussed.  Patient verbalized understanding of dc instructions and prescriptions.  Patient signed discharge instructions.  Patient verbalized he had all belongings with him, Denied having any home medications locked in our inpatient pharmacy that  he needs back. Patient wheeled from discharge lounge to taxi. Patient left via taxi to home in stable condition.

## 2025-03-07 NOTE — THERAPY
Physical Therapy   Daily Treatment     Patient Name: Christoph Taylor  Age:  59 y.o., Sex:  male  Medical Record #: [id number]  Today's Date: 3/6/2025     Precautions  Precautions: (P) Fall Risk  Comments: (P) Lft LE AFO    Assessment    The pt found seated in chair, showered and amb w/nrsg supervision. Improvement in pt's BP has allowed for increase in OOB mobility. The pt is planning on staying w/his friend who lives in a [facility] w/2 platform steps to enter, which he stated he has managed in the past. The pt will need a FWW for d/c.   PT will be available for d/c ?'s, the pt is functioning @ a level to mobilize w/nrsg.     Plan    Treatment Plan Status: Continue Current Treatment Plan  Type of Treatment: Gait Training, Neuro Re-Education / Balance, Self Care / Home Evaluation, Stair Training, Therapeutic Activities, Therapeutic Exercise  Treatment Frequency: 3 Times per Week  Treatment Duration: Until Therapy Goals Met    DC Equipment Recommendations: (P) Front-Wheel Walker  Discharge Recommendations: (P) Anticipate that the patient will have no further physical therapy needs after discharge from the hospital     Objective       03/06/25 1601   Time In/Time Out   Therapy Start Time 1538   Therapy End Time 1601   Total Therapy Time 23   Charge Group   PT Self Care / Home Evaluation (Units) 1   Total Time Spent   PT Self Care/Home Evaluation Time Spent (Mins) 23   PT Total Time Spent (Calculated) 23   Precautions   Precautions Fall Risk   Comments Lft LE AFO   Other Treatments   Other Treatments Provided Met w/pt, he stated he showered and amb around nrsg unit w/CNA using the FWW. Improvement w/pt's BP has allowed for an increase OOB mobility. He is wanting to leave tomorrow to  his dog and car and drive to his friends home in [place]. The pt in much better spirits and is looking forward to getting back on [employer] insurance, he even recited his insurance number. Encouraged pt to get his AFO fixed so that it fits  properly. PT will be available for any d/c ?'s. His friends home has 2 platform steps that he has managed before.   Anticipated Discharge Equipment and Recommendations   DC Equipment Recommendations Front-Wheel Walker   Discharge Recommendations Anticipate that the patient will have no further physical therapy needs after discharge from the hospital   Interdisciplinary Plan of Care Collaboration   IDT Collaboration with  Nursing   Patient Position at End of Therapy Seated;Call Light within Reach;Tray Table within Reach;Phone within Reach   Collaboration Comments Nrsg notified   Session Information   Date / Session Number  3/6--5 (1/3, 3/12) PTA/4   Supervising Physical Therapist (PTA Treatments Only)   Supervising Physical Therapist Vanessa Scott

## 2025-03-08 ENCOUNTER — HOSPITAL ENCOUNTER (EMERGENCY)
Facility: MEDICAL CENTER | Age: 60
End: 2025-03-11
Attending: EMERGENCY MEDICINE
Payer: COMMERCIAL

## 2025-03-08 ENCOUNTER — APPOINTMENT (OUTPATIENT)
Dept: RADIOLOGY | Facility: MEDICAL CENTER | Age: 60
End: 2025-03-08
Attending: EMERGENCY MEDICINE
Payer: COMMERCIAL

## 2025-03-08 DIAGNOSIS — R33.9 URINARY RETENTION: ICD-10-CM

## 2025-03-08 DIAGNOSIS — N30.00 ACUTE CYSTITIS WITHOUT HEMATURIA: ICD-10-CM

## 2025-03-08 DIAGNOSIS — R45.851 SUICIDAL IDEATION: ICD-10-CM
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
## 2025-03-08 LAB
ALBUMIN SERPL BCP-MCNC: 3.5 G/DL (ref 3.2–4.9)
ALBUMIN/GLOB SERPL: 1.3 G/DL
ALP SERPL-CCNC: 79 U/L (ref 30–99)
ALT SERPL-CCNC: 12 U/L (ref 2–50)
ANION GAP SERPL CALC-SCNC: 9 MMOL/L (ref 7–16)
AST SERPL-CCNC: 15 U/L (ref 12–45)
BASOPHILS # BLD AUTO: 0.2 % (ref 0–1.8)
BASOPHILS # BLD: 0.01 K/UL (ref 0–0.12)
BILIRUB SERPL-MCNC: 0.2 MG/DL (ref 0.1–1.5)
BUN SERPL-MCNC: 20 MG/DL (ref 8–22)
CALCIUM ALBUM COR SERPL-MCNC: 9.2 MG/DL (ref 8.5–10.5)
CALCIUM SERPL-MCNC: 8.8 MG/DL (ref 8.5–10.5)
CHLORIDE SERPL-SCNC: 102 MMOL/L (ref 96–112)
CO2 SERPL-SCNC: 29 MMOL/L (ref 20–33)
CREAT SERPL-MCNC: 0.65 MG/DL (ref 0.5–1.4)
EOSINOPHIL # BLD AUTO: 0.08 K/UL (ref 0–0.51)
EOSINOPHIL NFR BLD: 1.7 % (ref 0–6.9)
ERYTHROCYTE [DISTWIDTH] IN BLOOD BY AUTOMATED COUNT: 40.9 FL (ref 35.9–50)
ETHANOL BLD-MCNC: <10.1 MG/DL
GFR SERPLBLD CREATININE-BSD FMLA CKD-EPI: 107 ML/MIN/1.73 M 2
GLOBULIN SER CALC-MCNC: 2.7 G/DL (ref 1.9–3.5)
GLUCOSE SERPL-MCNC: 188 MG/DL (ref 65–99)
HCT VFR BLD AUTO: 31.3 % (ref 42–52)
HGB BLD-MCNC: 10.2 G/DL (ref 14–18)
IMM GRANULOCYTES # BLD AUTO: 0.01 K/UL (ref 0–0.11)
IMM GRANULOCYTES NFR BLD AUTO: 0.2 % (ref 0–0.9)
LYMPHOCYTES # BLD AUTO: 1.02 K/UL (ref 1–4.8)
LYMPHOCYTES NFR BLD: 22 % (ref 22–41)
MCH RBC QN AUTO: 25.4 PG (ref 27–33)
MCHC RBC AUTO-ENTMCNC: 32.6 G/DL (ref 32.3–36.5)
MCV RBC AUTO: 77.9 FL (ref 81.4–97.8)
MONOCYTES # BLD AUTO: 0.36 K/UL (ref 0–0.85)
MONOCYTES NFR BLD AUTO: 7.8 % (ref 0–13.4)
NEUTROPHILS # BLD AUTO: 3.15 K/UL (ref 1.82–7.42)
NEUTROPHILS NFR BLD: 68.1 % (ref 44–72)
NRBC # BLD AUTO: 0 K/UL
NRBC BLD-RTO: 0 /100 WBC (ref 0–0.2)
PLATELET # BLD AUTO: 202 K/UL (ref 164–446)
PMV BLD AUTO: 8.3 FL (ref 9–12.9)
POC BREATHALIZER: 0 PERCENT (ref 0–0.01)
POTASSIUM SERPL-SCNC: 3.9 MMOL/L (ref 3.6–5.5)
PROT SERPL-MCNC: 6.2 G/DL (ref 6–8.2)
RBC # BLD AUTO: 4.02 M/UL (ref 4.7–6.1)
SODIUM SERPL-SCNC: 140 MMOL/L (ref 135–145)
WBC # BLD AUTO: 4.6 K/UL (ref 4.8–10.8)

## 2025-03-08 PROCEDURE — 99285 EMERGENCY DEPT VISIT HI MDM: CPT

## 2025-03-08 PROCEDURE — 82077 ASSAY SPEC XCP UR&BREATH IA: CPT

## 2025-03-08 PROCEDURE — 73630 X-RAY EXAM OF FOOT: CPT | Mod: LT

## 2025-03-08 PROCEDURE — 80053 COMPREHEN METABOLIC PANEL: CPT

## 2025-03-08 PROCEDURE — 36415 COLL VENOUS BLD VENIPUNCTURE: CPT

## 2025-03-08 PROCEDURE — 302970 POC BREATHALIZER: Performed by: EMERGENCY MEDICINE

## 2025-03-08 PROCEDURE — 85025 COMPLETE CBC W/AUTO DIFF WBC: CPT

## 2025-03-08 PROCEDURE — 302970 POC BREATHALIZER

## 2025-03-08 ASSESSMENT — FIBROSIS 4 INDEX: FIB4 SCORE: 0.98

## 2025-03-08 ASSESSMENT — PAIN DESCRIPTION - PAIN TYPE: TYPE: ACUTE PAIN

## 2025-03-09 LAB
AMPHET UR QL SCN: NEGATIVE
APPEARANCE UR: ABNORMAL
BACTERIA #/AREA URNS HPF: ABNORMAL /HPF
BARBITURATES UR QL SCN: NEGATIVE
BENZODIAZ UR QL SCN: NEGATIVE
BILIRUB UR QL STRIP.AUTO: NEGATIVE
BZE UR QL SCN: NEGATIVE
CANNABINOIDS UR QL SCN: NEGATIVE
CASTS URNS QL MICRO: ABNORMAL /LPF (ref 0–2)
COLOR UR: YELLOW
EPITHELIAL CELLS 1715: ABNORMAL /HPF (ref 0–5)
FENTANYL UR QL: NEGATIVE
GLUCOSE UR STRIP.AUTO-MCNC: NEGATIVE MG/DL
KETONES UR STRIP.AUTO-MCNC: NEGATIVE MG/DL
LEUKOCYTE ESTERASE UR QL STRIP.AUTO: ABNORMAL
METHADONE UR QL SCN: NEGATIVE
MICRO URNS: ABNORMAL
NITRITE UR QL STRIP.AUTO: NEGATIVE
OPIATES UR QL SCN: NEGATIVE
OXYCODONE UR QL SCN: POSITIVE
PCP UR QL SCN: NEGATIVE
PH UR STRIP.AUTO: 6 [PH] (ref 5–8)
PROPOXYPH UR QL SCN: NEGATIVE
PROT UR QL STRIP: 300 MG/DL
RBC # URNS HPF: ABNORMAL /HPF (ref 0–2)
RBC UR QL AUTO: ABNORMAL
SP GR UR STRIP.AUTO: 1.01
UROBILINOGEN UR STRIP.AUTO-MCNC: 0.2 EU/DL
WBC #/AREA URNS HPF: ABNORMAL /HPF

## 2025-03-09 PROCEDURE — 700102 HCHG RX REV CODE 250 W/ 637 OVERRIDE(OP): Performed by: HOSPITALIST

## 2025-03-09 PROCEDURE — A9270 NON-COVERED ITEM OR SERVICE: HCPCS | Performed by: STUDENT IN AN ORGANIZED HEALTH CARE EDUCATION/TRAINING PROGRAM

## 2025-03-09 PROCEDURE — 81001 URINALYSIS AUTO W/SCOPE: CPT

## 2025-03-09 PROCEDURE — 700102 HCHG RX REV CODE 250 W/ 637 OVERRIDE(OP): Performed by: PSYCHIATRY & NEUROLOGY

## 2025-03-09 PROCEDURE — A9270 NON-COVERED ITEM OR SERVICE: HCPCS | Performed by: PSYCHIATRY & NEUROLOGY

## 2025-03-09 PROCEDURE — 51702 INSERT TEMP BLADDER CATH: CPT

## 2025-03-09 PROCEDURE — 87186 SC STD MICRODIL/AGAR DIL: CPT

## 2025-03-09 PROCEDURE — 90791 PSYCH DIAGNOSTIC EVALUATION: CPT

## 2025-03-09 PROCEDURE — 87077 CULTURE AEROBIC IDENTIFY: CPT

## 2025-03-09 PROCEDURE — 700102 HCHG RX REV CODE 250 W/ 637 OVERRIDE(OP): Performed by: STUDENT IN AN ORGANIZED HEALTH CARE EDUCATION/TRAINING PROGRAM

## 2025-03-09 PROCEDURE — A9270 NON-COVERED ITEM OR SERVICE: HCPCS | Performed by: HOSPITALIST

## 2025-03-09 PROCEDURE — 303105 HCHG CATHETER EXTRA

## 2025-03-09 PROCEDURE — 80307 DRUG TEST PRSMV CHEM ANLYZR: CPT

## 2025-03-09 PROCEDURE — 90832 PSYTX W PT 30 MINUTES: CPT | Performed by: SOCIAL WORKER

## 2025-03-09 PROCEDURE — 99283 EMERGENCY DEPT VISIT LOW MDM: CPT | Performed by: PSYCHIATRY & NEUROLOGY

## 2025-03-09 PROCEDURE — 87086 URINE CULTURE/COLONY COUNT: CPT

## 2025-03-09 PROCEDURE — 99285 EMERGENCY DEPT VISIT HI MDM: CPT | Performed by: HOSPITALIST

## 2025-03-09 RX ORDER — PIOGLITAZONE 30 MG/1
30 TABLET ORAL DAILY
Status: DISCONTINUED | OUTPATIENT
Start: 2025-03-09 | End: 2025-03-11 | Stop reason: HOSPADM

## 2025-03-09 RX ORDER — ACETAMINOPHEN 500 MG
1000 TABLET ORAL ONCE
Status: COMPLETED | OUTPATIENT
Start: 2025-03-09 | End: 2025-03-09

## 2025-03-09 RX ORDER — ACETAMINOPHEN 325 MG/1
650 TABLET ORAL EVERY 6 HOURS PRN
Status: DISCONTINUED | OUTPATIENT
Start: 2025-03-09 | End: 2025-03-11 | Stop reason: HOSPADM

## 2025-03-09 RX ORDER — AMOXICILLIN 250 MG
2 CAPSULE ORAL EVERY EVENING
Status: DISCONTINUED | OUTPATIENT
Start: 2025-03-09 | End: 2025-03-11 | Stop reason: HOSPADM

## 2025-03-09 RX ORDER — CEPHALEXIN 500 MG/1
500 CAPSULE ORAL EVERY 6 HOURS
Status: DISCONTINUED | OUTPATIENT
Start: 2025-03-09 | End: 2025-03-11 | Stop reason: HOSPADM

## 2025-03-09 RX ORDER — TAMSULOSIN HYDROCHLORIDE 0.4 MG/1
0.4 CAPSULE ORAL
Status: DISCONTINUED | OUTPATIENT
Start: 2025-03-09 | End: 2025-03-11 | Stop reason: HOSPADM

## 2025-03-09 RX ORDER — POLYETHYLENE GLYCOL 3350 17 G/17G
1 POWDER, FOR SOLUTION ORAL
Status: DISCONTINUED | OUTPATIENT
Start: 2025-03-09 | End: 2025-03-11 | Stop reason: HOSPADM

## 2025-03-09 RX ORDER — MIDODRINE HYDROCHLORIDE 5 MG/1
5 TABLET ORAL
Status: DISCONTINUED | OUTPATIENT
Start: 2025-03-09 | End: 2025-03-11 | Stop reason: HOSPADM

## 2025-03-09 RX ORDER — METHYLPHENIDATE HYDROCHLORIDE 5 MG/1
5 TABLET ORAL
Refills: 0 | Status: DISCONTINUED | OUTPATIENT
Start: 2025-03-10 | End: 2025-03-09

## 2025-03-09 RX ORDER — VENLAFAXINE 75 MG/1
75 TABLET ORAL DAILY
Status: DISCONTINUED | OUTPATIENT
Start: 2025-03-09 | End: 2025-03-09

## 2025-03-09 RX ORDER — VENLAFAXINE HYDROCHLORIDE 75 MG/1
225 CAPSULE, EXTENDED RELEASE ORAL DAILY
Status: DISCONTINUED | OUTPATIENT
Start: 2025-03-10 | End: 2025-03-11 | Stop reason: HOSPADM

## 2025-03-09 RX ORDER — METHYLPHENIDATE HYDROCHLORIDE 5 MG/1
5 TABLET ORAL
Refills: 0 | Status: DISCONTINUED | OUTPATIENT
Start: 2025-03-10 | End: 2025-03-11 | Stop reason: HOSPADM

## 2025-03-09 RX ORDER — VITAMIN B COMPLEX
1000 TABLET ORAL DAILY
Status: DISCONTINUED | OUTPATIENT
Start: 2025-03-09 | End: 2025-03-11 | Stop reason: HOSPADM

## 2025-03-09 RX ORDER — GABAPENTIN 300 MG/1
300 CAPSULE ORAL 3 TIMES DAILY
Status: DISCONTINUED | OUTPATIENT
Start: 2025-03-09 | End: 2025-03-11 | Stop reason: HOSPADM

## 2025-03-09 RX ORDER — GLIPIZIDE 10 MG/1
10 TABLET ORAL
Status: DISCONTINUED | OUTPATIENT
Start: 2025-03-09 | End: 2025-03-11 | Stop reason: HOSPADM

## 2025-03-09 RX ADMIN — CEPHALEXIN 500 MG: 500 CAPSULE ORAL at 06:21

## 2025-03-09 RX ADMIN — DOCUSATE SODIUM 50 MG AND SENNOSIDES 8.6 MG 2 TABLET: 8.6; 5 TABLET, FILM COATED ORAL at 17:43

## 2025-03-09 RX ADMIN — CEPHALEXIN 500 MG: 500 CAPSULE ORAL at 17:45

## 2025-03-09 RX ADMIN — TAMSULOSIN HYDROCHLORIDE 0.4 MG: 0.4 CAPSULE ORAL at 10:12

## 2025-03-09 RX ADMIN — GLIPIZIDE 10 MG: 10 TABLET ORAL at 12:01

## 2025-03-09 RX ADMIN — SITAGLIPTIN 100 MG: 100 TABLET, FILM COATED ORAL at 11:00

## 2025-03-09 RX ADMIN — ACETAMINOPHEN 650 MG: 325 TABLET ORAL at 14:49

## 2025-03-09 RX ADMIN — RIVAROXABAN 10 MG: 10 TABLET, FILM COATED ORAL at 17:44

## 2025-03-09 RX ADMIN — GABAPENTIN 300 MG: 300 CAPSULE ORAL at 17:45

## 2025-03-09 RX ADMIN — Medication 10 MG: at 21:05

## 2025-03-09 RX ADMIN — MIDODRINE HYDROCHLORIDE 5 MG: 5 TABLET ORAL at 17:44

## 2025-03-09 RX ADMIN — Medication 1000 UNITS: at 16:30

## 2025-03-09 RX ADMIN — VENLAFAXINE HYDROCHLORIDE 75 MG: 75 TABLET ORAL at 12:01

## 2025-03-09 RX ADMIN — CEPHALEXIN 500 MG: 500 CAPSULE ORAL at 12:00

## 2025-03-09 RX ADMIN — PIOGLITAZONE 30 MG: 30 TABLET ORAL at 12:01

## 2025-03-09 RX ADMIN — ACETAMINOPHEN 1000 MG: 500 TABLET ORAL at 01:16

## 2025-03-09 RX ADMIN — MIDODRINE HYDROCHLORIDE 5 MG: 5 TABLET ORAL at 12:00

## 2025-03-09 RX ADMIN — GABAPENTIN 300 MG: 300 CAPSULE ORAL at 12:00

## 2025-03-09 ASSESSMENT — ANXIETY QUESTIONNAIRES
2. NOT BEING ABLE TO STOP OR CONTROL WORRYING: NEARLY EVERY DAY
IF YOU CHECKED OFF ANY PROBLEMS ON THIS QUESTIONNAIRE, HOW DIFFICULT HAVE THESE PROBLEMS MADE IT FOR YOU TO DO YOUR WORK, TAKE CARE OF THINGS AT HOME, OR GET ALONG WITH OTHER PEOPLE: EXTREMELY DIFFICULT
GAD7 TOTAL SCORE: 16
7. FEELING AFRAID AS IF SOMETHING AWFUL MIGHT HAPPEN: NEARLY EVERY DAY
5. BEING SO RESTLESS THAT IT IS HARD TO SIT STILL: NOT AT ALL
4. TROUBLE RELAXING: NEARLY EVERY DAY
3. WORRYING TOO MUCH ABOUT DIFFERENT THINGS: NEARLY EVERY DAY
6. BECOMING EASILY ANNOYED OR IRRITABLE: NEARLY EVERY DAY
1. FEELING NERVOUS, ANXIOUS, OR ON EDGE: SEVERAL DAYS

## 2025-03-09 ASSESSMENT — ENCOUNTER SYMPTOMS
FALLS: 1
DIFFICULTY FALLING ASLEEP: 1
IRRITABILITY: 1
BACK PAIN: 1
DEPRESSION: 1
WEAKNESS: 1

## 2025-03-09 ASSESSMENT — PAIN DESCRIPTION - PAIN TYPE
TYPE: ACUTE PAIN
TYPE: ACUTE PAIN

## 2025-03-09 ASSESSMENT — PATIENT HEALTH QUESTIONNAIRE - PHQ9: 5. POOR APPETITE OR OVEREATING: 2 - MORE THAN HALF THE DAYS

## 2025-03-09 NOTE — ED PROVIDER NOTES
ER Provider Note    Scribed for Rick Mak M.d. by Vanessa Santamaria. 3/8/2025  9:19 PM    Primary Care Provider: DIANA Tariq    CHIEF COMPLAINT   Chief Complaint   Patient presents with    Suicidal Ideation     Pt states he has a gun at home and thoughts to harm himself     Weakness     Pt BIB EMS from home for failure to thrive. Pt lives alone and has had multiple falls, feels generalized weakness throughout his body     EXTERNAL RECORDS REVIEWED  Outpatient Notes Patient was discharged yesterday for back pain and suicidal ideation and failure to thrive.      HPI/ROS  LIMITATION TO HISTORY   Select: : None  OUTSIDE HISTORIAN(S):  None    Christoph Taylor is a 59 y.o. male who presents to the ED for evaluation of weakness onset prior to arrival. Patient reports he has fallen four times today. He states he uses a wheelchair at home and fell while attempting to move from the wheelchair to the couch. He states he also fell in the bathroom today and he reports it takes 10 minutes to get back onto his feet. He is experiencing left foot pain, right shoulder pain and back pain. Patient states he is taking muscle relaxer's for the pain. Denies any auditory or visual hallucinations. He denies any alcohol or drug use today. Patient reports he has been feeling suicidal and he has a gun at home with plans to shoot himself.     PAST MEDICAL HISTORY  Past Medical History:   Diagnosis Date    Abnormal electrocardiogram (ECG) (EKG) 11/08/2021    Acute cystitis without hematuria 01/26/2024    8/10 positive urinalysis, with associated hyperglycemia, will start antibiotics, follow-up cultures, initial culture with UA on August 8 was negative      Acute esophagitis 07/28/2022    Acute hypoxic respiratory failure (HCC) 07/22/2024    Acute metabolic encephalopathy 06/09/2023    Acute on chronic anemia 05/06/2024    Acute on chronic respiratory failure (HCC) 05/20/2024    Acute respiratory failure with hypoxia  ([facility]) 07/25/2024    Acute superficial gastritis without hemorrhage 05/21/2024    Agitation 06/09/2024    KRISTAL (acute kidney injury) (HCC) 11/08/2021    AP (abdominal pain) 06/08/2023    Chest pain in adult 11/08/2021    Chronic renal insufficiency 06/04/2024    CKD (chronic kidney disease) stage 3, GFR 30-59 ml/min 11/07/2021    Diabetes ([facility])     Diabetic ketoacidosis without coma ([facility]) 11/07/2021    Diabetic ketoacidosis without coma associated with type 2 diabetes mellitus ([facility]) 11/07/2021    Diarrhea 03/18/2022    DKA, type 1, not at goal ([facility]) 07/28/2022    Elevated troponin 06/10/2023    Esophagitis 07/28/2022    [place] (hard of hearing)     Very [place] No hearing aides    Hypercholesteremia     Impaction of intestine ([facility]) 05/21/2024    Intractable left upper quadrant abdominal pain 05/21/2024    Leukocytosis 05/20/2024    Metabolic acidosis, increased anion gap 11/07/2021    Syncope 06/09/2023    Transaminitis 06/12/2024       SURGICAL HISTORY  Past Surgical History:   Procedure Laterality Date    W[place] UPPER GI ENDOSCOPY,DIAGNOSIS N/A 12/9/2024    Procedure: GASTROSCOPY WITH BIOPSY;  Surgeon: Radha Ivan M.D.;  Location: SURGERY SAME DAY [facility];  Service: Gastroenterology    FUSION, SPINE, LUMBAR, PLIF N/A 6/7/2024    Procedure: FUSION, SPINE, LUMBAR, PLIF- REDO LUMBAR 4-5 LAMINECTOMY WITH L4-5 STEALTH FUSION;  Surgeon: Moo Pulido III, M.D.;  Location: SURGERY [facility];  Service: Neurosurgery    LUMBAR LAMINECTOMY DISKECTOMY N/A 6/7/2024    Procedure: LAMINECTOMY, SPINE, LUMBAR, WITH DISCECTOMY;  Surgeon: Moo Pulido III, M.D.;  Location: SURGERY [facility];  Service: Neurosurgery    CERVICAL DISK AND FUSION ANTERIOR N/A 6/4/2024    Procedure: C4-C7 ANTERIOR CERVICAL DISC AND FUSION;  Surgeon: Moo Pulido III, M.D.;  Location: SURGERY [facility];  Service: Neurosurgery    FINGER OR HAND INCISION AND DRAINAGE Right 6/12/2023    Procedure: INCISION AND DRAINAGE, HAND - THUMB;  Surgeon: Ace HINES  DILIP Shaw;  Location: SURGERY [facility];  Service: Orthopedics    [place] UPPER GI ENDOSCOPY,DIAGNOSIS N/A 3/14/2023    Procedure: GASTROSCOPY;  Surgeon: Atilio Freed M.D.;  Location: SURGERY [facility];  Service: Gastroenterology    OTHER      appy, lumbar fusion       FAMILY HISTORY  Family History   Problem Relation Age of Onset    Heart Disease Father        SOCIAL HISTORY   reports that he has never smoked. He has never used smokeless tobacco. He reports current alcohol use. He reports that he does not currently use drugs.    CURRENT MEDICATIONS  Previous Medications    CALCIUM POLYCARBOPHIL (FIBER) 625 MG TAB    Take 1 Tablet by mouth 3 times a day with meals.    CONTINUOUS GLUCOSE SENSOR (FREESTYLE MAITE 14 DAY SENSOR) MISC    1 Each every 14 days.    GABAPENTIN (NEURONTIN) 300 MG CAP    Take 2 Capsules by mouth 3 times a day.    GLIPIZIDE (GLUCOTROL) 10 MG TAB    Take 1 Tablet by mouth every morning before breakfast.    LIDOCAINE (ASPERFLEX) 4 % PATCH    Place 2 Patches on the skin every 24 hours. wear for 12 hours and remove for 12 hours    MELATONIN 10 MG TAB    Take 1 tablet by mouth every evening.    MELOXICAM (MOBIC) 15 MG TABLET    Take 1 Tablet by mouth every day.    METHOCARBAMOL (ROBAXIN) 750 MG TAB    Take 1 Tablet by mouth 4 times a day.    METHYLPHENIDATE (RITALIN) 5 MG TAB    Take 1 Tablet by mouth every morning with breakfast for 30 days.    MIDODRINE (PROAMATINE) 5 MG TAB    Take 1 Tablet by mouth 3 times a day with meals.    OMEPRAZOLE (PRILOSEC) 40 MG DELAYED-RELEASE CAPSULE    Take 1 Capsule by mouth every day for 200 days.    PIOGLITAZONE (ACTOS) 30 MG TAB    Take 1 Tablet by mouth every day.    SITAGLIPTIN (JANUVIA) 100 MG TAB    Take 1 Tablet by mouth every day.    VENLAFAXINE (EFFEXOR) 75 MG TAB    Take 1 Tablet by mouth every day.    VITAMIN D (CHOLECALCIFEROL) 1000 UNIT TAB    Take 1 Tablet by mouth every day.       ALLERGIES  Ketamine    PHYSICAL EXAM  BP (!) 150/94   Pulse  70   Temp 36.7 °C (98.1 °F) (Temporal)   Resp 17   Ht 1.829 m (6')   Wt 72.6 kg (160 lb)   SpO2 97%   BMI 21.70 kg/m²   Constitutional: Well developed, Well nourished, Mild distress.   HENT: Normocephalic, Atraumatic,  Eyes: Conjunctiva normal, No discharge.   Cardiovascular: Normal heart rate, Normal rhythm, No murmurs, equal pulses.   Pulmonary: Normal breath sounds, No respiratory distress, No wheezing, No rales, No rhonchi.  Abdomen:Soft, No tenderness.   Back: No CVA tenderness.  No vertebral point tenderness  Musculoskeletal: No major deformities noted, Slight tenderness to left mid foot.  Skin: Warm, Dry, No erythema, No rash.   Neurologic: Alert & oriented x 3, Normal motor function, 5/5 strength in bilateral upper and lower extremities. No focal deficits noted.   Psychiatric:  States he is suicidal with plan to shoot himself. He denies any auditory or visual hallucinations.     DIAGNOSTIC STUDIES    LABS  Results for orders placed or performed during the hospital encounter of 03/08/25   POC BREATHALIZER    Collection Time: 03/08/25  9:15 PM   Result Value Ref Range    POC Breathalizer 0.00 0.00 - 0.01 Percent   CBC WITH DIFFERENTIAL    Collection Time: 03/08/25  9:35 PM   Result Value Ref Range    WBC 4.6 (L) 4.8 - 10.8 K/uL    RBC 4.02 (L) 4.70 - 6.10 M/uL    Hemoglobin 10.2 (L) 14.0 - 18.0 g/dL    Hematocrit 31.3 (L) 42.0 - 52.0 %    MCV 77.9 (L) 81.4 - 97.8 fL    MCH 25.4 (L) 27.0 - 33.0 pg    MCHC 32.6 32.3 - 36.5 g/dL    RDW 40.9 35.9 - 50.0 fL    Platelet Count 202 164 - 446 K/uL    MPV 8.3 (L) 9.0 - 12.9 fL    Neutrophils-Polys 68.10 44.00 - 72.00 %    Lymphocytes 22.00 22.00 - 41.00 %    Monocytes 7.80 0.00 - 13.40 %    Eosinophils 1.70 0.00 - 6.90 %    Basophils 0.20 0.00 - 1.80 %    Immature Granulocytes 0.20 0.00 - 0.90 %    Nucleated RBC 0.00 0.00 - 0.20 /100 WBC    Neutrophils (Absolute) 3.15 1.82 - 7.42 K/uL    Lymphs (Absolute) 1.02 1.00 - 4.80 K/uL    Monos (Absolute) 0.36 0.00 - 0.85  K/uL    Eos (Absolute) 0.08 0.00 - 0.51 K/uL    Baso (Absolute) 0.01 0.00 - 0.12 K/uL    Immature Granulocytes (abs) 0.01 0.00 - 0.11 K/uL    NRBC (Absolute) 0.00 K/uL   COMP METABOLIC PANEL    Collection Time: 03/08/25  9:35 PM   Result Value Ref Range    Sodium 140 135 - 145 mmol/L    Potassium 3.9 3.6 - 5.5 mmol/L    Chloride 102 96 - 112 mmol/L    Co2 29 20 - 33 mmol/L    Anion Gap 9.0 7.0 - 16.0    Glucose 188 (H) 65 - 99 mg/dL    Bun 20 8 - 22 mg/dL    Creatinine 0.65 0.50 - 1.40 mg/dL    Calcium 8.8 8.5 - 10.5 mg/dL    Correct Calcium 9.2 8.5 - 10.5 mg/dL    AST(SGOT) 15 12 - 45 U/L    ALT(SGPT) 12 2 - 50 U/L    Alkaline Phosphatase 79 30 - 99 U/L    Total Bilirubin 0.2 0.1 - 1.5 mg/dL    Albumin 3.5 3.2 - 4.9 g/dL    Total Protein 6.2 6.0 - 8.2 g/dL    Globulin 2.7 1.9 - 3.5 g/dL    A-G Ratio 1.3 g/dL   DIAGNOSTIC ALCOHOL    Collection Time: 03/08/25  9:35 PM   Result Value Ref Range    Diagnostic Alcohol <10.1 <10.1 mg/dL   ESTIMATED GFR    Collection Time: 03/08/25  9:35 PM   Result Value Ref Range    GFR (CKD-EPI) 107 >60 mL/min/1.73 m 2     RADIOLOGY/PROCEDURES   The attending emergency physician has independently interpreted the diagnostic imaging associated with this visit and am waiting the final reading from the radiologist.   My preliminary interpretation is a follows: Does not show any obvious fracture    Radiologist interpretation:  DX-FOOT-COMPLETE 3+ LEFT   Final Result         1.  No acute traumatic bony injury.   2.  Severe degenerative changes of the midfoot   3.  Pes planus          COURSE & MEDICAL DECISION MAKING     ASSESSMENT, COURSE AND PLAN  Care Narrative:   9:22 PM - Patient seen and examined at bedside. Discussed plan of care, including diagnostic imaging and lab work. Patient agrees to the plan of care. Ordered for urine drug screen, diagnostic alcohol, UA, CMP, CBC with diff, POC breathalyzer, DX-foot to evaluate his symptoms. Differential diagnoses include but not limited to:  Malingering, electrolyte abnormality, suicidal ideation    ED OBS: Yes; I am placing the patient in to an observation status due to a diagnostic uncertainty as well as therapeutic intensity. Patient placed in observation status at 9:58 PM, 3/8/2025.     Observation plan is as follows: Transferred to psychiatric facility    Discussed the case with life skills who think the patient is high risk and needs to be placed on a legal 2000.      Patient is turned over at 12 AM pending transfer to psychiatric facility  PROBLEM LIST  Patient presents with suicidal ideation after recently being in the hospital and seen by psychiatry states he has a plan to shoot himself with a gun and has access to a gun. At this point in time given his age and the plan to hurt himself I think he is high risk to hurt himself therefore legal 2000 has been filled out.    Problem #2 weakness patient is just recently been admitted and discharged after being evaluated by PT OT I do not find any subjective weakness on my evaluation his labs are unremarkable and does not show any signs of an infectious process.  I x-rayed his foot and there is no fracture.  I do not think he needs to be hospitalized for this    DISPOSITION AND DISCUSSIONS  I have discussed management of the patient with the following physicians and MICHELLE's: None    Discussion of management with other Q[facility] or appropriate source(s): Behavioral Health who thinks the patient needs to be on a legal 2000      Escalation of care considered, and ultimately not performed: acute inpatient care management, however at this time, the patient is most appropriate for outpatient management.    Barriers to care at this time, including but not limited to:  None .         FINAL DIAGNOSIS  1. Suicidal ideation    2. Fall, initial encounter            I, Vanessa Wise), am scribing for, and in the presence of, Rick Mak M.D.    Electronically signed by: Vanessa Wise),  3/8/2025    IRick M.D personally performed the services described in this documentation, as scribed by Vanessa Santamaria in my presence, and it is both accurate and complete.      The note accurately reflects work and decisions made by me.  Rick Mak M.D.  3/8/2025  10:23 PM

## 2025-03-09 NOTE — ED NOTES
Patient resting on gurney, equal chest rise and fall, pt in 1:1 observation with sitter in direct view, pt in hospital scrubs, no needs at this time. Crowley in place draining clear yellow urine

## 2025-03-09 NOTE — CONSULTS
"[facility] Assessment (New)    Name: Christoph Taylor  MRN: [id number]  : 1965  Age: 59 y.o.  Date of assessment: 25  PCP: DIANA Tariq  Persons in attendance: Patient    HPI, Per Medical Record:  \"59 y.o. male who presented 3/8/2025 with back pain, falls, weakness, suicidal ideation.   Mr. Taylor has a medical history of diabetes mellitus, chronic back pain recent surgery by Dr. Pulido lumbar region 2024, hypotension, failure to thrive, numerous admissions for suicidal ideation that was most recent admitted to this hospital from 2/10/2025 3/7/2025 was on a legal hold eventually this was lifted by psychiatry team.  He was placed on effect support and Ritalin.  During the hospitalization he did have a Crowley catheter temporarily placed on Flomax and doxazosin but they were stopped due to orthostatic hypotension and was placed on midodrine.  Eventually off those medications he was still able to continuously.  He was discharged with plan of moving to [facility] to be closer to family.  He had a fall was unable to care for himself presented again with suicidal ideation for which she has been placed on hold.  Emergency room he was not able to urinate therefore was placed urinalysis was positive he is empirically started on oral Keflex.\" Patient was referred to Behavioral Health for a Legal Hold evaluation and psychotherapy session.    CHIEF COMPLAINT/PRESENTING ISSUE (as stated by Patient): Christoph Taylor is a 59 year old male seen lying on hospital bed, alert, oriented, speech clear and coherent, depressed with flat affect, no auditory or visual hallucinations.  Patient reports suicidal ideations with the plan to shoot himself with his gun.   Patient has been hospitalized and discharged from this hospital for medical reasons since  per medical records. Behavioral health became involved with patient beginning 2024. Patient has been on and off legal holds " "since that time, without receiving care from an inpatient psychiatric hospital. Patient's medical conditions present a barrier for admission to a psychiatric hospital. Patient has received Behavioral Health support since May 2024 during his admissions and has been able to contract for safety, working towards stabilization of mood and learning new ways of coping with distress, resulting in the discontinuation of the Legal Hold.  Patient's medical condition appears to have deteriorated per his report, based on previous functioning ability and levels of independence. Patient's reports he is not capable of caring for himself without support due to his current level of impairment. He appears to be unable to hold items, open bottles for medications, walk for any significant distance, falling frequently in the home, cook for himself, check his blood sugar or any other basic self care requirements without assistance. After the most recent admission to the hospital, February 2025 to March 2025, patient has been discharged home, twice,  just to be readmitted within 24-48 hours later as a result of the aforementioned issues. Patient reports going to live with a friend in [place], [place] during the last admission but there was no plan for how patient was going to get there as he is unsure he can drive given his limited ability to move independently, hold a steering wheel due to his fingers being constricted, or drive safely for 173 miles. He cannot take himself in and out the car to use the bathroom, get gas, or safely adjust to any type of emergency that requires franklin hand and arm movement on the steering wheel for example, per patients report. Patient states, \"I didn't have a choice, I was discharged late in the day and I did not know if I could drive myself anywhere for that long a distance or at night\".   Patient becomes hopeless when home alone with no ability to care for himself. Patient tries to move around the home and " "reports repeatedly falling. Patient becomes hopeless and begins to revert back to making suicidal statements . He is then placed on a hold again. At this point, patient has not attempted to harm himself, but states he has access to a gun and has threatened to shoot himself. Patient is unable to contract for safety.  Patient states, \"I have no one to help me, no one cares about me, I cannot take care of myself, I don't want to live this way anymore\". Patient is unable to contract for safety at the time of this interview. Patient would benefit from inpatient psychiatric hospitalization for crisis stabilization if a facility could be found.      Chief Complaint   Patient presents with    Suicidal Ideation     Pt states he has a gun at home and thoughts to harm himself     Weakness     Pt BIB EMS from home for failure to thrive. Pt lives alone and has had multiple falls, feels generalized weakness throughout his body        PSYCHIATRIC REVIEW OF SYSTEMS:   Mood:      Depressed mood     Distinct period of irritable mood     Mood changes  Sleep:      Difficulty falling asleep  Anxiety:      Excessive worry     Irritability  Substance Abuse:      Alcohol use  Cognitive Symptoms:      serious difficulty concentrating, remembering, making decisions  Suicidal Ideation:      Suicidal ideas     Plan available      BHUMI-7 Questionnaire  Feeling nervous, anxious, or on edge:  Several days   Not being able to sop or control worrying:  Nearly every day   Worrying too much about different things:  Nearly every day   Trouble relaxing:  Nearly every day   Being so restless that it's hard to sit still:  Not at all   Becoming easily annoyed or irritable:  Nearly every day   Feeling afraid as if something awful might happen:  Nearly every day   Total:  16   How difficult  have these problems made it for you to do your work, take care of things at home, or get along with other people? - Extremely difficult    Interpretation of BHUMI 7 Total " Score   Score Severity: 0-4 No Anxiety, 5-9 Mild Anxiety, 10-14 Moderate Anxiety, 15-21 Severe Anxiety    PHQ-9 Depression Screening    Little interest or pleasure in doing things?  3 - nearly every day   Feeling down, depressed, or hopeless?  3 - nearly every day   Trouble falling or staying asleep, or sleeping too much?   2 - more than half the days   Feeling tired or having little energy?  3 - nearly every day   Poor appetite or overeating?   2 - more than half the days   Feeling bad about yourself - or that you are a failure or have let yourself or your family down?  3 - nearly every day   Trouble concentrating on things, such as reading the newspaper or watching television?  3 - nearly every day   Moving or speaking so slowly that other people could have noticed.  Or the opposite - being so fidgety or restless that you have been moving around a lot more than usual?   2 - more than half the days   Thoughts that you would be better off dead, or of hurting yourself?   2 - more than half the days   Patient Health Questionnaire Score:  23     Interpretation of PHQ-9 Total Score   Score Severity: 1-4 No Depression, 5-9 Mild Depression, 10-14 Moderate Depression, 15-19 Moderately Severe Depression, 20-27 Severe Depression    [facility] Performed?:  N/A    Behavioral Health Treatment History  Does patient/parent report a history of prior behavioral health treatment for patient? No:    SAFETY ASSESSMENT - SELF  Does patient acknowledge current or past symptoms of dangerousness to self? yes   Does parent/significant other report patient has current or past symptoms of dangerousness to self? N\A     Does presenting problem suggest symptoms of dangerousness to self? Yes:     Past Current    Suicidal Thoughts: [x]  [x]    Suicidal Plans: [x]  [x]    Suicidal Intent: []  []    Suicide Attempts: []  []    Self-Injury []  []      For any boxes checked above, provide detail: Plan to shoot himself with a gun    History of suicide by  "family member: no  History of suicide by friend/significant other: no  Recent change in frequency/specificity/intensity of suicidal thoughts or self-harm behavior?   Current access to firearms, medications, or other identified means of suicide/self-harm? No, not while in the hospital  If yes, willing to restrict access to means of suicide/self-harm? yes - while in the hospital  Protective factors present:   none reported    SAFETY ASSESSMENT - OTHERS  Does patient acknowledge current or past symptoms of aggressive behavior or risk to others? no   Does parent/significant other report patient has current or past symptoms of aggressive behavior or risk to others? N\A     Does presenting problem suggest symptoms of dangerousness to others?  No      Crisis Safety Plan completed and copy given to patient? no    ABUSE/NEGLECT SCREENING  Does patient report feeling “unsafe” in his/her home, or afraid of anyone? no   Does patient report any history of physical, sexual, or emotional abuse? no   Does parent or significant other report any of the above? N\A   Is there evidence of neglect by self? yes   Is there evidence of neglect by a caregiver? no   Does the patient/parent report any history of CPS/APS/police involvement related to suspected abuse/neglect or domestic violence? no   Based on the information provided during the current assessment, is a mandated report of suspected abuse/neglect being made? No     SUBSTANCE USE SCREENING  Yes:  Merlin all substances used in the past 30 days:      Last Use Amount   []   Alcohol     []   Marijuana     []   Heroin     []   Prescription Opioids  (used without prescription, for    recreation, or in excess of prescribed amount)     []   Other Prescription  (used without prescription, for    recreation, or in excess of prescribed amount)     []   Cocaine      []   Methamphetamine     []   \"\" drugs (ectasy, MDMA)     []   Other substances        UDS results: not " assessed  Breathalyzer results: not assessed    What consequences does the patient associate with any of the above substance use and or addictive behaviors? None    Risk factors for detox (check all that apply):  []  Seizures   []  Diaphoretic (sweating)   []  Tremors   []  Hallucinations   []  Increased blood pressure   []  Decreased blood pressure   []  Other   []  None      [] Patient education on risk factors for detoxification and instructed to return to ER as needed.    MENTAL STATUS  Participation Limited verbal participation   Grooming Disheveled   Orientation Alert   Behavior Tense   Eye contact Limited   Mood Irritable, depressed   Affect Flat   Thought Process Circumstantial   Thought Content Rumination   Speech Soft   Perception Within normal limits   Memory Recent:  Adequate   Insight Poor   Judgement Poor   Other      Collateral Information:  Source: [facility] Nursing Staff, [facility] Medical Record, and  Other: Psychiatry    Unable to complete full assessment due to:  NA - Assessment completed    Clinical Impressions:  Primary: Major Depressive Disorder, severe, recurrent  Secondary: Personality disorder NOS    Recommendations and Observation Level:  Sitter: one to one  Phone: yes  Visitors: yes  Personal belongings: phone and glasses    Legal Hold: josé miguel Tripathi, Ph.D., [facility]  3/9/2025    Length of Intervention:  30 minutes

## 2025-03-09 NOTE — ED NOTES
Bedside report received from off going RN/tech: Elaina CASPER, assumed care of patient.  POC discussed with patient.  All needs addressed at this time.       Fall risk interventions in place: Move the patient closer to the nurse's station, Place fall risk sign on patient's door, Keep floor surfaces clean and dry, and Human-sitter if tele-sitter fails (all applicable per [place] Fall risk assessment)   Continuous monitoring: Not Applicable   IVF/IV medications: Not Applicable   Oxygen: Room Air  Bedside sitter: Report given to sitter  Isolation: Not Applicable

## 2025-03-09 NOTE — ASSESSMENT & PLAN NOTE
Legal hold  He had been on a legal hold last admission but it was lifted  He now endorses suicidal ideation though does not have a plan.   Restart Effexor and Ritalin

## 2025-03-09 NOTE — ED NOTES
Pt assisted to bedside commode with one person assist. Pt had medium soft bowl movement. Pt back to bed. 1:1 sitter remains in clear view

## 2025-03-09 NOTE — ED NOTES
Pt resting on gurney, 1:1 sitter within direct view of pt, active chest rise noted, no needs at this time

## 2025-03-09 NOTE — ED NOTES
Patient resting on stretcher. Respirations even and unlabored; equal chest rise and fall. Patient can move independently. [place]ney is locked in lowest position. 1:1 sitter in clear view across from bed.

## 2025-03-09 NOTE — CONSULTS
Hospital Medicine Consultation    Date of Service  3/9/2025    Referring Physician  Hillary Schroeder M.D.    Consulting Physician  Bong Hale M.D.    Reason for Consultation  Urinary retention    History of Presenting Illness  59 y.o. male who presented 3/8/2025 with back pain, falls, weakness, suicidal ideation.   Mr. Taylor has a medical history of diabetes mellitus, chronic back pain recent surgery by Dr. Pulido lumbar region June 2024, hypotension, failure to thrive, numerous admissions for suicidal ideation that was most recent admitted to this hospital from 2/10/2025 3/7/2025 was on a legal hold eventually this was lifted by psychiatry team.  He was placed on effect support and Ritalin.  During the hospitalization he did have a Crowley catheter temporarily placed on Flomax and doxazosin but they were stopped due to orthostatic hypotension and was placed on midodrine.  Eventually off those medications he was still able to continuously.  He was discharged with plan of moving to [facility] to be closer to family.  He had a fall was unable to care for himself presented again with suicidal ideation for which she has been placed on hold.  Emergency room he was not able to urinate therefore was placed urinalysis was positive he is empirically started on oral Keflex.    I discussed his condition with Dr. Schroeder physician as well as his nurse.  Flomax has been started as has midodrine    Review of Systems  Review of Systems   Musculoskeletal:  Positive for back pain and falls.   Neurological:  Positive for weakness.   Psychiatric/Behavioral:  Positive for depression and suicidal ideas.        Past Medical History   has a past medical history of Abnormal electrocardiogram (ECG) (EKG) (11/08/2021), Acute cystitis without hematuria (01/26/2024), Acute esophagitis (07/28/2022), Acute hypoxic respiratory failure (HCC) (07/22/2024), Acute metabolic encephalopathy (06/09/2023), Acute on chronic anemia  (05/06/2024), Acute on chronic respiratory failure (HCC) (05/20/2024), Acute respiratory failure with hypoxia (HCC) (07/25/2024), Acute superficial gastritis without hemorrhage (05/21/2024), Agitation (06/09/2024), KRISTAL (acute kidney injury) ([facility]) (11/08/2021), AP (abdominal pain) (06/08/2023), Chest pain in adult (11/08/2021), Chronic renal insufficiency (06/04/2024), CKD (chronic kidney disease) stage 3, GFR 30-59 ml/min (11/07/2021), Diabetes ([facility]), Diabetic ketoacidosis without coma ([facility]) (11/07/2021), Diabetic ketoacidosis without coma associated with type 2 diabetes mellitus ([facility]) (11/07/2021), Diarrhea (03/18/2022), DKA, type 1, not at goal ([facility]) (07/28/2022), Elevated troponin (06/10/2023), Esophagitis (07/28/2022), [place] (hard of hearing), Hypercholesteremia, Impaction of intestine ([facility]) (05/21/2024), Intractable left upper quadrant abdominal pain (05/21/2024), Leukocytosis (05/20/2024), Metabolic acidosis, increased anion gap (11/07/2021), Syncope (06/09/2023), and Transaminitis (06/12/2024).    Surgical History   has a past surgical history that includes other; pr upper gi endoscopy,diagnosis (N/A, 3/14/2023); finger or hand incision and drainage (Right, 6/12/2023); cervical disk and fusion anterior (N/A, 6/4/2024); fusion, spine, lumbar, plif (N/A, 6/7/2024); lumbar laminectomy diskectomy (N/A, 6/7/2024); and pr upper gi endoscopy,diagnosis (N/A, 12/9/2024).    Family History  family history includes Heart Disease in his father.    Social History   reports that he has never smoked. He has never used smokeless tobacco. He reports current alcohol use. He reports that he does not currently use drugs.    Medications  Prior to Admission Medications   Prescriptions Last Dose Informant Patient Reported? Taking?   Calcium Polycarbophil (FIBER) 625 MG Tab Not Taking Rx Bottle (For Med Information), Patient No No   Sig: Take 1 Tablet by mouth 3 times a day with meals.   Patient not taking: Reported on 3/9/2025    Continuous Glucose Sensor (FREESTYLE MAITE 14 DAY SENSOR) [facility]  Patient, Rx Bottle (For Med Information) No No   Si Each every 14 days.   Melatonin 10 MG Tab Unknown Rx Bottle (For Med Information), Patient No No   Sig: Take 1 tablet by mouth every evening.   SITagliptin (JANUVIA) 100 MG Tab Unknown Rx Bottle (For Med Information), Patient No No   Sig: Take 1 Tablet by mouth every day.   gabapentin (NEURONTIN) 300 MG Cap Unknown Rx Bottle (For Med Information), Patient No No   Sig: Take 2 Capsules by mouth 3 times a day.   glipiZIDE (GLUCOTROL) 10 MG Tab Unknown Rx Bottle (For Med Information), Patient No No   Sig: Take 1 Tablet by mouth every morning before breakfast.   lidocaine (ASPERFLEX) 4 % Patch Not Taking Rx Bottle (For Med Information), Patient No No   Sig: Place 2 Patches on the skin every 24 hours. wear for 12 hours and remove for 12 hours   Patient not taking: Reported on 3/9/2025   meloxicam (MOBIC) 15 MG tablet Unknown Rx Bottle (For Med Information), Patient No No   Sig: Take 1 Tablet by mouth every day.   methocarbamol (ROBAXIN) 750 MG Tab Unknown Rx Bottle (For Med Information), Patient No No   Sig: Take 1 Tablet by mouth 4 times a day.   methylphenidate (RITALIN) 5 MG Tab Unknown Rx Bottle (For Med Information), Patient No No   Sig: Take 1 Tablet by mouth every morning with breakfast for 30 days.   midodrine (PROAMATINE) 5 MG Tab Unknown Rx Bottle (For Med Information), Patient No No   Sig: Take 1 Tablet by mouth 3 times a day with meals.   omeprazole (PRILOSEC) 40 MG delayed-release capsule Unknown Rx Bottle (For Med Information), Patient No No   Sig: Take 1 Capsule by mouth every day for 200 days.   pioglitazone (ACTOS) 30 MG Tab Unknown Rx Bottle (For Med Information), Patient No No   Sig: Take 1 Tablet by mouth every day.   venlafaxine (EFFEXOR) 75 MG Tab Unknown Rx Bottle (For Med Information), Patient No No   Sig: Take 1 Tablet by mouth every day.   vitamin D (CHOLECALCIFEROL) 1000 UNIT  Tab Unknown Rx Bottle (For Med Information), Patient No No   Sig: Take 1 Tablet by mouth every day.      Facility-Administered Medications: None       Allergies  Allergies   Allergen Reactions    Ketamine Unspecified     aggressive       Physical Exam  Temp:  [36.6 °C (97.8 °F)-36.8 °C (98.2 °F)] 36.6 °C (97.8 °F)  Pulse:  [70-87] 87  Resp:  [15-19] 15  BP: (123-150)/(77-94) 137/87  SpO2:  [95 %-97 %] 96 %    Physical Exam  Vitals and nursing note reviewed.   Constitutional:       Comments: Thin, chronically ill appearing   Cardiovascular:      Rate and Rhythm: Normal rate and regular rhythm.   Pulmonary:      Effort: Pulmonary effort is normal.      Breath sounds: Normal breath sounds.   Neurological:      Mental Status: He is alert.      Comments: Hard of hearing  Moves his arms and legs equally though exam limited by back pain   Psychiatric:      Comments: Flat affect         Fluids      Laboratory  Recent Labs     03/08/25  2135   WBC 4.6*   RBC 4.02*   HEMOGLOBIN 10.2*   HEMATOCRIT 31.3*   MCV 77.9*   MCH 25.4*   MCHC 32.6   RDW 40.9   PLATELETCT 202   MPV 8.3*     Recent Labs     03/08/25  2135   SODIUM 140   POTASSIUM 3.9   CHLORIDE 102   CO2 29   GLUCOSE 188*   BUN 20   CREATININE 0.65   CALCIUM 8.8                     Imaging  MRIs from Feb 11 2025:  1.  Moderate chronic compression deformity at T12.  2.  No acute fracture.  3.  Multifocal degenerative disease as described above.  4.  There has been no significant interval change.    1.  Postsurgical and degenerative changes as described above.  2.  Moderate chronic compression deformity at T12.  3.  No acute abnormality.  4.  There has been no significant interval change    Assessment/Plan  * Suicidal ideation- (present on admission)  Assessment & Plan  Legal hold  He had been on a legal hold last admission but it was lifted  He now endorses suicidal ideation though does not have a plan.   Restart Effexor and Ritalin     Chronic midline back pain- (present  on admission)  Assessment & Plan  He has had extensive imaging 2/11/25 MRI thoracic and lumbar spine with finding of T12 fx.  Restart gabapentin  Prior surgery by Dr. Pulido neurosurgery June 2024:  PROCEDURES PERFORMED:  1.  Redo bilateral L5 laminotomy, foraminotomy, decompression of thecal sac   and nerve roots.  2. L4 laminectomy.  3.  Bilateral L5 transpedicular approaches facetectomies, 59 modifier.  4.  Stealth guidance for the spine.  5.  Hip bone marrow autograft aspirate concentrate via separate incision.  6.  L4, L5 Stealth-guided pedicle screw fixation.  7.  L4-L5 posterolateral allograft autograft stephania arthrodesis and fusion.  8.  Intraoperative monitoring.    Urinary retention- (present on admission)  Assessment & Plan  He has had urinary retention previously and was on flomax and doxazosin both of which were stopped due to orthostatic hypotension.  A jiménez was placed in the ER  Restart flomax with midodrine and remove jiménez on 3/10. RNs to teach him how to self-cath q6 hours as needed if he cannot urinate. Mr. Taylor is not a good candidate for long-term jiménez placement.  Outpatient urology consult would be appropriate.     Orthostatic hypotension- (present on admission)  Assessment & Plan  Started on midodrine last admission  He was too hypotensive to tolerate doxazosin which may need to be restarted  Restart midodrine with holding parameters  Query an alpha synucleinopathy such as Multiple System Atrophy  This is difficult to sort out given his chronic back pain, poorly controlled diabetes, multiple medications, and psychiatric disorders.    Frequent falls- (present on admission)  Assessment & Plan  Midodrine  Therapies  He will benefit from living with others for help in some fashion  Query alpha synucleinopathy such as Multiple System Atrophy or ALS though there is no successful treatment nor cure for either. If his condition continues to decompensate then consider further work up.    UTI (urinary  tract infection)- (present on admission)  Assessment & Plan  + UA with difficulty urinating  He has been initiated on empiric cephalexin   Await urine culture    Type 2 diabetes mellitus (HCC)- (present on admission)  Assessment & Plan  Glucose 188 in the ER  Restart his home meds and start a diabetic diet.   HbA1c was 9.8 last month

## 2025-03-09 NOTE — DISCHARGE PLANNING
[facility] ED Behavioral Health Fax Referral      Referral: Legal Hold    Intervention: Patient referral to community inpatient  facillity    Legal Hold Initiated: Date: 03/08/25 Time: 2105    Patient’s Insurance Listed on Face Sheet: [employer]    Referrals sent to:  [facility], [facility], [facility] , [facility]  Referrals faxed by Yoni Johnson RN, MBA    This referral contains the following information:  Face sheet _x___  Page 1 and Page 2 of Legal Hold ___x_  Alert Team Assessment/Psych Assessment ___x_  Head to toe physical exam __x__  Urine Drug Screen ____  Blood Alcohol _x___  Vital signs _x___  Pregnancy test when applicable ___  Medications list __x__  Covid screening ____    Plan: Patient will transfer to mental health facility once acceptance is obtained    For all referral information, please contact the  referral office daily between the hours of 7:00 a.m. to 6:00 p.m. at:   Phone [phone]   Fax [phone]    ED  Alert Team   Phone [phone] Fax [phone]    [facility] Emergency Department Contact numbers:  Green Pod [phone]   Blue Pod [phone]   Red Pod [phone]   Pediatrics [phone]

## 2025-03-09 NOTE — PROGRESS NOTES
ED Observation Progress Note    Date of Service: 03/09/25    Interval History and Interventions  Patient was seen in this emergency department, presenting last night initially for evaluation of weakness.  He has a past medical history significant for type 2 diabetes, degenerative disc disease, diabetic retinopathy, neuropathy, navicular fracture of the foot, and history of failure to thrive.  He was hospitalized for nearly a month, 1/9/2025 to 2/7/2025 for increasing weakness.  Most recently he was admitted to/10/25 to 3/7/2025 for urinary retention and weakness.  He was discharged on the day prior to his admission.  States that he has fallen at home while attempting to move from his wheelchair.  Also reported feeling suicidal, and reported gun ownership with a plan to shoot himself.       Lab work and imaging were unremarkable.     He was placed on a legal hold by emergency physician, due to high risk history, and access to a firearm at home.     He was seen and evaluated by behavioral health last night,, behavioral health RN is in agreement that the patient is an imminent safety risk, is in agreement with legal hold placement with transfer for inpatient behavioral health stabilization.     I reviewed his discharge summary from 3/7/2025, noted that he was admitted for chronic back pain with history of multiple laminectomies and fractures, during this admission MRI of the spine was done which demonstrated stable findings, neurosurgery recommended nonoperative management.  He was recently treated for pneumonia, declined by skilled nursing facility, discharged home with home health services and in-home caregiver.  He expressed suicidal ideation while hospitalized, initially placed on legal hold which was ultimately discontinued by the psychiatry team.  He was placed on Ritalin and Effexor with good effect.  Also briefly had a Crowley catheter in place which was since discontinued.  He was discharged home with a plan to  stay with a friend in [facility].     On my initial assessment this morning, he states that he did have plans to go to [place], [place].  However he began to fall as soon as he was discharged, and was not able to safely ambulate.  States his escalated his suicidal thoughts, and he was not able to return to [place] as planned.  He has no acute concerns at this time, is eating breakfast.  Does not report any pain or discomfort.     Hospital medicine physician was consulted. Dr. Hale evaluated the patient, recommendation is to remove Crowley catheter tomorrow out of concern that he may develop an infection, after 24 hours of tamsulosin which was started last night.  Orders placed to teach the patient how to self cath.  He remains under ED observation status at this time.    Referral sent by alert team to [facility].    Physical Exam  BP 98/65   Pulse 90   Temp 36.8 °C (98.3 °F) (Temporal)   Resp 16   Ht 1.829 m (6')   Wt 72.6 kg (160 lb)   SpO2 91%   BMI 21.70 kg/m² .    Constitutional: Awake and alert. Afebrile.  HENT:  Atraumatic, normocephalic  Eyes: Normal conjunctiva, no scleral icterus, no periorbital edema  Neck: Normal and painless range of motion, supple  Cardiovascular: No tachycardia, regular rhythm  Thorax & Lungs: Normal work of breathing, no tachypnea  Abdomen: Nondistended  Skin:  Warm, dry, intact  Extremities: No abnormal swelling nor deformity  Psychiatric: Calm and cooperative, continues to report suicidal thoughts    Labs  Urine drug screen is positive only for oxycodone. Urinalysis shows many bacteria, CMP with no significant abnormalities. CBC notable for slightly low white blood count at 4.6, which is consistent with his prior baseline values. Hemoglobin is 10.2, also consistent with prior values.     Radiology  DX-FOOT-COMPLETE 3+ LEFT   Final Result         1.  No acute traumatic bony injury.   2.  Severe degenerative changes of the midfoot   3.   Pes planus          Problem List  1. Suicidal ideation    2. Fall, initial encounter    3. Urinary retention    4. Acute cystitis without hematuria

## 2025-03-09 NOTE — ED NOTES
Medication Reconciliation    Medication reconciliation is complete per patient reporting and patient's prescription bottles.  Patient reported he is not sure how each medication is taken and is unable to read the instructions on the prescription bottles due to visual deficits. He states he has not taken any medications since he was discharged from this facility on 3/7/25.  Allergies reviewed.  No outpatient antibiotics in the last 30 days.  No anticoagulants in the last 14 days.  Patient's home pharmacy is [facility] on [address] [phone].    Shandra Bradford, Pharmacy Intern

## 2025-03-09 NOTE — ASSESSMENT & PLAN NOTE
Midodrine  Therapies  He will benefit from living with others for help in some fashion  Query alpha synucleinopathy such as Multiple System Atrophy or ALS though there is no successful treatment nor cure for either. If his condition continues to decompensate then consider further work up.

## 2025-03-09 NOTE — ASSESSMENT & PLAN NOTE
Started on midodrine last admission  He was too hypotensive to tolerate doxazosin which may need to be restarted  Restart midodrine with holding parameters  Query an alpha synucleinopathy such as Multiple System Atrophy  This is difficult to sort out given his chronic back pain, poorly controlled diabetes, multiple medications, and psychiatric disorders.

## 2025-03-09 NOTE — ED PROVIDER NOTES
4 AM informed by nurse that patient is retaining urine he has inability urinate has 500 cc of urine in his bladder  4:30 AM nurse placed Crowley catheter without difficulty patient without fever or flank pain urinalysis is concerning for cystitis/urinary tract infection we will placed on tamsulosin and cephalexin for complicated UTI

## 2025-03-09 NOTE — DISCHARGE PLANNING
Alert Team/Behavioral Health   Note:      Mental Health Transfer    Referral: transfer to mental health facility.    Intervention: Paula @ [facility] called to accept patient.    Patient's accepting provider is Gila MAC.    Transport arranged through  at [facility] ambulance.    The patient will be picked up @ 1400.    Notified Attending Provider (Karishma ESTRADA), Psychiatrist (Alona MAC), Bedside RN (Savi TIPTON), and Alert Team RN (Leah RIVAS) via Voalte message of the departure time as well as accepting facility.    [facility] .    Transfer packet with COBRA form to be created and placed in chart.    Skin assessment note and infection control form needs to be received by [facility] before transfer at fax [phone].     Nurse-to-nurse report needs to be called in @1300 to [phone].    Plan: transfer to [facility] via [facility] ambulance for acute inpatient psychiatric care.

## 2025-03-09 NOTE — ASSESSMENT & PLAN NOTE
He has had urinary retention previously and was on flomax and doxazosin both of which were stopped due to orthostatic hypotension.  A jiménez was placed in the ER  Restart flomax with midodrine and remove jiménez on 3/10. RNs to teach him how to self-cath q6 hours as needed if he cannot urinate. Mr. Taylor is not a good candidate for long-term jiménez placement.  Outpatient urology consult would be appropriate.

## 2025-03-09 NOTE — ED NOTES
Bladder scan result: 526 mL  Notified to the provider  Given permission to insert FC and send urine sample

## 2025-03-09 NOTE — ED TRIAGE NOTES
Chief Complaint   Patient presents with    Suicidal Ideation     Pt states he has a gun at home and thoughts to harm himself     Weakness     Pt BIB EMS from home for failure to thrive. Pt lives alone and has had multiple falls, feels generalized weakness throughout his body     A+O x 4, GCS 15, answering questions appropriately    PT recently discharged from [facility] on 3/7 for back pain/urinary retention/SI    Pt placed on L2K for SI and unable to care for self

## 2025-03-09 NOTE — ASSESSMENT & PLAN NOTE
He has had extensive imaging 2/11/25 MRI thoracic and lumbar spine with finding of T12 fx.  Restart gabapentin  Prior surgery by Dr. Pulido neurosurgery June 2024:  PROCEDURES PERFORMED:  1.  Redo bilateral L5 laminotomy, foraminotomy, decompression of thecal sac   and nerve roots.  2. L4 laminectomy.  3.  Bilateral L5 transpedicular approaches facetectomies, 59 modifier.  4.  Stealth guidance for the spine.  5.  Hip bone marrow autograft aspirate concentrate via separate incision.  6.  L4, L5 Stealth-guided pedicle screw fixation.  7.  L4-L5 posterolateral allograft autograft stephania arthrodesis and fusion.  8.  Intraoperative monitoring.

## 2025-03-09 NOTE — DISCHARGE PLANNING
[facility] has declined pt due to complex medical needs that they do not have the resources to provide.

## 2025-03-09 NOTE — ED NOTES
Checked on bed, with unlabored respirations. No safety risk noted  Sleeping  Sitter - 1:1 with continuous visual monitoring by Trained Personnel  Continued safety precaution  Wolfgangrjuliet in low position, side rail up for pt safety.   No needs identified at the moment

## 2025-03-09 NOTE — CONSULTS
"Patient briefly seen at bedside.  He is packed and dressed in clothing ready to leave.  He seemed in good spirits and states he is eager to leave for [place] where he will be staying with his friend.  We did provide patient with outpatient resources for mental health in [place], [place] where he is planning on staying.  He states he does have the resource sheet and thanked the provider for the help that was provided during this admission.  He continues to not express any thoughts of harming self or others and appears more future oriented.  He states he is looking forward to\" getting things going.\"  He reports he is anxious to leave before it is too dark outside so that he can make it to [place].  No other questions or concerns expressed.  "

## 2025-03-09 NOTE — ED NOTES
Patient resting on stretcher in no acute distress. Equal chest rise noted. VS stable. Bed locked and in low position. 1:1 sitter present across bedside.

## 2025-03-09 NOTE — CONSULTS
"PSYCHIATRIC INTAKE EVALUATION(new)  Reason for admission:   Chief Complaint   Patient presents with    Suicidal Ideation     Pt states he has a gun at home and thoughts to harm himself     Weakness     Pt BIB EMS from home for failure to thrive. Pt lives alone and has had multiple falls, feels generalized weakness throughout his body       Reason for consult: \"SI\"  Requesting Provider:   Sukhwinder Whitley       Legal Hold Status:  on hold           Chart reviewed.         *HPI:  Patient was calm and cooperative with evaluation. He reported calling 911 after having multiple falls at home. Stated taht he was feeling worthless and helpless at home, and for that reason he endorses SI when he came to the ED. Pt denied any SA at home. Denied any suicide gesture. Pt says that being home alone is depressing. Reported having difficult meeting his ADLS by himself. He could not read his medicine bottles and for that reason did not take his medications at home. Pt denies any active or passive SI. NO HI. NO acute psychosis. He endorses some anxiety. Pt says that his friend would be able to pick him up on Tuesday and take him to live with him and his wife. He is a long term friend and welcomes his dog as well. Pt says he hopes to establish in [place], where his kids live. He keeps in touch with them, but states it is not easy for them to come visit him. Pt denied using any drugs or alcohol at home.He feels that since Ritalin was added to his regimen, he has been able to stay awake more hours during the day. Overall, endorses day somnolence, fair appetite, decreased energy that has not significantly improved.  Denied difficult with his concentration. Has some guilty feeling realted to leaving his dog alone (neighbors are currently feeding his dog. His dog is his main reason to live for. Pt is looking forward to have his house sold, stating the closing date is April 4th. He wants to participate in PT and regain his strength.   " "        *Psychiatric Examination:  Vitals:   Vitals:    03/09/25 1404   BP: 98/65   Pulse: 90   Resp: 16   Temp: 36.8 °C (98.3 °F)   SpO2: 91%   Appearance: appears stated age, moderate grooming and hygiene, calm, cooperative, good eye contact  Abnormal movements: none  Gait/posture: normal  Speech: slightly slurred   Though process: linear and goal oriented  Associations: no loose associations  Thought content: denies AVH, no delusions or paranoia elicited, does not appear to be responding to internal stimuli, neither is internally preoccupied.   Judgement and Insight: fair/fair  Orientation:oriented to person, place, time and situation  Recent and Remote Memory: intact  Attention Span and Concentration: intact  Language: fluid   Fund of Knowledge: not tested   Mood and Affect:\"I'm a little anxious\", constricted but reactive   SI/HI:denies any active or passive SI/HI      Past Medical History:   Past Medical History:   Diagnosis Date    Abnormal electrocardiogram (ECG) (EKG) 11/08/2021    Acute cystitis without hematuria 01/26/2024    8/10 positive urinalysis, with associated hyperglycemia, will start antibiotics, follow-up cultures, initial culture with UA on August 8 was negative      Acute esophagitis 07/28/2022    Acute hypoxic respiratory failure (HCC) 07/22/2024    Acute metabolic encephalopathy 06/09/2023    Acute on chronic anemia 05/06/2024    Acute on chronic respiratory failure (HCC) 05/20/2024    Acute respiratory failure with hypoxia (HCC) 07/25/2024    Acute superficial gastritis without hemorrhage 05/21/2024    Agitation 06/09/2024    KRISTAL (acute kidney injury) (HCC) 11/08/2021    AP (abdominal pain) 06/08/2023    Chest pain in adult 11/08/2021    Chronic renal insufficiency 06/04/2024    CKD (chronic kidney disease) stage 3, GFR 30-59 ml/min 11/07/2021    Diabetes ([facility])     Diabetic ketoacidosis without coma ([facility]) 11/07/2021    Diabetic ketoacidosis without coma associated with type 2 diabetes " "mellitus ([facility]) 11/07/2021    Diarrhea 03/18/2022    DKA, type 1, not at goal ([facility]) 07/28/2022    Elevated troponin 06/10/2023    Esophagitis 07/28/2022    [place] (hard of hearing)     Very [place] No hearing aides    Hypercholesteremia     Impaction of intestine ([facility]) 05/21/2024    Intractable left upper quadrant abdominal pain 05/21/2024    Leukocytosis 05/20/2024    Metabolic acidosis, increased anion gap 11/07/2021    Syncope 06/09/2023    Transaminitis 06/12/2024        Past Psychiatric History: reviewed  Previous Diagnosis:MDD  Current meds:effexor 225mg PO daily  Vitamin D  Ritalin 5mg daily  Previous med trials:not assessed  Hospitalizations:denies  Suicide attempts/SIB:has made a gesture end of last year (placed gun on his head)  Outpatient services:  Access to guns:access to firearm at home  Abuse/trauma hx:denies per chart  Legal hx:no current legal issued    Family Hx: maternal :etoh    Social Hx: per chart  \"Born in [facility] for 4 years  Education: graduated HS  Support: \"not a single person\"   x2,  x2, currently single  Has 3 adult male children  Has 1 brother, 1 sister  Housing: lives alone, has pet dog, Carlos, border collie  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   10 mg at 03/09/25 1201    melatonin tablet 10 mg  10 mg Oral Nightly Bong Hale M.D.        [START ON 3/10/2025] methylphenidate (Ritalin) tablet 5 mg  5 mg Oral QDAY with Breakfast Bong Hale, " M.D.        midodrine (Proamatine) tablet 5 mg  5 mg Oral TID WITH MEALS Bong Hale M.D.   5 mg at 03/09/25 1200    SITagliptin (Januvia) tablet 100 mg  100 mg Oral DAILY Bong Hale M.D.   100 mg at 03/09/25 1100    pioglitazone (Actos) tablet 30 mg  30 mg Oral DAILY Bong Hale M.D.   30 mg at 03/09/25 1201    venlafaxine (Effexor) tablet 75 mg  75 mg Oral DAILY Bong Hale M.D.   75 mg at 03/09/25 1201    rivaroxaban (Xarelto) tablet 10 mg  10 mg Oral DAILY AT 1800 Bong Hale M.D.        acetaminophen (Tylenol) tablet 650 mg  650 mg Oral Q6HRS PRN Bong Hale M.D.   650 mg at 03/09/25 1449    senna-docusate (Pericolace Or Senokot S) 8.6-50 MG per tablet 2 Tablet  2 Tablet Oral Q EVENING Bong Hale M.D.        And    polyethylene glycol/lytes (Miralax) Packet 1 Packet  1 Packet Oral QDAY PRN Bong Hale M.D.        gabapentin (Neurontin) capsule 300 mg  300 mg Oral TID Bong Hale M.D.   300 mg at 03/09/25 1200     Current Outpatient Medications   Medication Sig Dispense Refill    methylphenidate (RITALIN) 5 MG Tab Take 1 Tablet by mouth every morning with breakfast for 30 days. 30 Tablet 0    gabapentin (NEURONTIN) 300 MG Cap Take 2 Capsules by mouth 3 times a day. 540 Capsule 0    glipiZIDE (GLUCOTROL) 10 MG Tab Take 1 Tablet by mouth every morning before breakfast. 100 Tablet 1    omeprazole (PRILOSEC) 40 MG delayed-release capsule Take 1 Capsule by mouth every day for 200 days. 100 Capsule 1    pioglitazone (ACTOS) 30 MG Tab Take 1 Tablet by mouth every day. 100 Tablet 1    SITagliptin (JANUVIA) 100 MG Tab Take 1 Tablet by mouth every day. 100 Tablet 1    venlafaxine (EFFEXOR) 75 MG Tab Take 1 Tablet by mouth every day. 100 Tablet 1    Calcium Polycarbophil (FIBER) 625 MG Tab Take 1 Tablet by mouth 3 times a day with meals. (Patient not taking: Reported on 3/9/2025) 300 Tablet 0    lidocaine (ASPERFLEX) 4 % Patch Place 2 Patches on the skin every 24 hours. wear for 12 hours and  remove for 12 hours (Patient not taking: Reported on 3/9/2025) 200 Patch 0    Melatonin 10 MG Tab Take 1 tablet by mouth every evening. 100 Tablet 0    methocarbamol (ROBAXIN) 750 MG Tab Take 1 Tablet by mouth 4 times a day. 360 Tablet 0    meloxicam (MOBIC) 15 MG tablet Take 1 Tablet by mouth every day. 100 Tablet 0    midodrine (PROAMATINE) 5 MG Tab Take 1 Tablet by mouth 3 times a day with meals. 270 Tablet 0    vitamin D (CHOLECALCIFEROL) 1000 UNIT Tab Take 1 Tablet by mouth every day. 100 Tablet 0    Continuous Glucose Sensor (FREESTYLE MAITE 14 DAY SENSOR) Misc 1 Each every 14 days. 6 Each 3        Allergies:  Ketamine       Labs personally reviewed:   Recent Results (from the past 72 hours)   POC BREATHALIZER    Collection Time: 03/08/25  9:15 PM   Result Value Ref Range    POC Breathalizer 0.00 0.00 - 0.01 Percent   CBC WITH DIFFERENTIAL    Collection Time: 03/08/25  9:35 PM   Result Value Ref Range    WBC 4.6 (L) 4.8 - 10.8 K/uL    RBC 4.02 (L) 4.70 - 6.10 M/uL    Hemoglobin 10.2 (L) 14.0 - 18.0 g/dL    Hematocrit 31.3 (L) 42.0 - 52.0 %    MCV 77.9 (L) 81.4 - 97.8 fL    MCH 25.4 (L) 27.0 - 33.0 pg    MCHC 32.6 32.3 - 36.5 g/dL    RDW 40.9 35.9 - 50.0 fL    Platelet Count 202 164 - 446 K/uL    MPV 8.3 (L) 9.0 - 12.9 fL    Neutrophils-Polys 68.10 44.00 - 72.00 %    Lymphocytes 22.00 22.00 - 41.00 %    Monocytes 7.80 0.00 - 13.40 %    Eosinophils 1.70 0.00 - 6.90 %    Basophils 0.20 0.00 - 1.80 %    Immature Granulocytes 0.20 0.00 - 0.90 %    Nucleated RBC 0.00 0.00 - 0.20 /100 WBC    Neutrophils (Absolute) 3.15 1.82 - 7.42 K/uL    Lymphs (Absolute) 1.02 1.00 - 4.80 K/uL    Monos (Absolute) 0.36 0.00 - 0.85 K/uL    Eos (Absolute) 0.08 0.00 - 0.51 K/uL    Baso (Absolute) 0.01 0.00 - 0.12 K/uL    Immature Granulocytes (abs) 0.01 0.00 - 0.11 K/uL    NRBC (Absolute) 0.00 K/uL   COMP METABOLIC PANEL    Collection Time: 03/08/25  9:35 PM   Result Value Ref Range    Sodium 140 135 - 145 mmol/L    Potassium 3.9 3.6 -  5.5 mmol/L    Chloride 102 96 - 112 mmol/L    Co2 29 20 - 33 mmol/L    Anion Gap 9.0 7.0 - 16.0    Glucose 188 (H) 65 - 99 mg/dL    Bun 20 8 - 22 mg/dL    Creatinine 0.65 0.50 - 1.40 mg/dL    Calcium 8.8 8.5 - 10.5 mg/dL    Correct Calcium 9.2 8.5 - 10.5 mg/dL    AST(SGOT) 15 12 - 45 U/L    ALT(SGPT) 12 2 - 50 U/L    Alkaline Phosphatase 79 30 - 99 U/L    Total Bilirubin 0.2 0.1 - 1.5 mg/dL    Albumin 3.5 3.2 - 4.9 g/dL    Total Protein 6.2 6.0 - 8.2 g/dL    Globulin 2.7 1.9 - 3.5 g/dL    A-G Ratio 1.3 g/dL   DIAGNOSTIC ALCOHOL    Collection Time: 03/08/25  9:35 PM   Result Value Ref Range    Diagnostic Alcohol <10.1 <10.1 mg/dL   ESTIMATED GFR    Collection Time: 03/08/25  9:35 PM   Result Value Ref Range    GFR (CKD-EPI) 107 >60 mL/min/1.73 m 2   URINE DRUG SCREEN    Collection Time: 03/09/25  5:00 AM   Result Value Ref Range    Amphetamines Urine Negative Negative    Barbiturates Negative Negative    Benzodiazepines Negative Negative    Cocaine Metabolite Negative Negative    Fentanyl, Urine Negative Negative    Methadone Negative Negative    Opiates Negative Negative    Oxycodone Positive (A) Negative    Phencyclidine -Pcp Negative Negative    Propoxyphene Negative Negative    Cannabinoid Metab Negative Negative   Urinalysis, Culture if Indicated    Collection Time: 03/09/25  5:00 AM    Specimen: Urine   Result Value Ref Range    Color Yellow     Character Cloudy (A)     Specific Gravity 1.014 <1.035    Ph 6.0 5.0 - 8.0    Glucose Negative Negative mg/dL    Ketones Negative Negative mg/dL    Protein 300 (A) Negative mg/dL    Bilirubin Negative Negative    Urobilinogen, Urine 0.2 <=1.0 EU/dL    Nitrite Negative Negative    Leukocyte Esterase Moderate (A) Negative    Occult Blood Small (A) Negative    Micro Urine Req Microscopic    URINE MICROSCOPIC (W/UA)    Collection Time: 03/09/25  5:00 AM   Result Value Ref Range    WBC  (A) /hpf    RBC 3-5 (A) 0 - 2 /hpf    Bacteria Many (A) None /hpf    Epithelial  Cells 0-2 0 - 5 /hpf    Urine Casts 0-2 0 - 2 /lpf     Current Facility-Administered Medications   Medication Dose Route Frequency Provider Last Rate Last Admin    tamsulosin (Flomax) capsule 0.4 mg  0.4 mg Oral AFTER BREAKFAST Sukhwinder LAURA Gutierrez   0.4 mg at 03/09/25 1012    cephALEXin (Keflex) capsule 500 mg  500 mg Oral Q6HRS Sukhwinder LAURA Gutierrez   500 mg at 03/09/25 1200    glipiZIDE (Glucotrol) tablet 10 mg  10 mg Oral QAM AC Bong Hale M.D.   10 mg at 03/09/25 1201    melatonin tablet 10 mg  10 mg Oral Nightly Bong Hale M.D.        [START ON 3/10/2025] methylphenidate (Ritalin) tablet 5 mg  5 mg Oral QDAY with Breakfast Bong Hale M.D.        midodrine (Proamatine) tablet 5 mg  5 mg Oral TID WITH MEALS Bong Hale M.D.   5 mg at 03/09/25 1200    SITagliptin (Januvia) tablet 100 mg  100 mg Oral DAILY Bong Hale M.D.   100 mg at 03/09/25 1100    pioglitazone (Actos) tablet 30 mg  30 mg Oral DAILY Bong Hale M.D.   30 mg at 03/09/25 1201    venlafaxine (Effexor) tablet 75 mg  75 mg Oral DAILY Bong Hale M.D.   75 mg at 03/09/25 1201    rivaroxaban (Xarelto) tablet 10 mg  10 mg Oral DAILY AT 1800 Bong Hale M.D.        acetaminophen (Tylenol) tablet 650 mg  650 mg Oral Q6HRS PRN Bong Hale M.D.   650 mg at 03/09/25 1449    senna-docusate (Pericolace Or Senokot S) 8.6-50 MG per tablet 2 Tablet  2 Tablet Oral Q EVENING Bong Hale M.D.        And    polyethylene glycol/lytes (Miralax) Packet 1 Packet  1 Packet Oral QDAY PRN Bong Hale M.D.        gabapentin (Neurontin) capsule 300 mg  300 mg Oral TID Bong Hale M.D.   300 mg at 03/09/25 1200     Current Outpatient Medications   Medication Sig Dispense Refill    methylphenidate (RITALIN) 5 MG Tab Take 1 Tablet by mouth every morning with breakfast for 30 days. 30 Tablet 0    gabapentin (NEURONTIN) 300 MG Cap Take 2 Capsules by mouth 3 times a day. 540 Capsule 0    glipiZIDE (GLUCOTROL) 10 MG Tab Take 1 Tablet by mouth every  morning before breakfast. 100 Tablet 1    omeprazole (PRILOSEC) 40 MG delayed-release capsule Take 1 Capsule by mouth every day for 200 days. 100 Capsule 1    pioglitazone (ACTOS) 30 MG Tab Take 1 Tablet by mouth every day. 100 Tablet 1    SITagliptin (JANUVIA) 100 MG Tab Take 1 Tablet by mouth every day. 100 Tablet 1    venlafaxine (EFFEXOR) 75 MG Tab Take 1 Tablet by mouth every day. 100 Tablet 1    Calcium Polycarbophil (FIBER) 625 MG Tab Take 1 Tablet by mouth 3 times a day with meals. (Patient not taking: Reported on 3/9/2025) 300 Tablet 0    lidocaine (ASPERFLEX) 4 % Patch Place 2 Patches on the skin every 24 hours. wear for 12 hours and remove for 12 hours (Patient not taking: Reported on 3/9/2025) 200 Patch 0    Melatonin 10 MG Tab Take 1 tablet by mouth every evening. 100 Tablet 0    methocarbamol (ROBAXIN) 750 MG Tab Take 1 Tablet by mouth 4 times a day. 360 Tablet 0    meloxicam (MOBIC) 15 MG tablet Take 1 Tablet by mouth every day. 100 Tablet 0    midodrine (PROAMATINE) 5 MG Tab Take 1 Tablet by mouth 3 times a day with meals. 270 Tablet 0    vitamin D (CHOLECALCIFEROL) 1000 UNIT Tab Take 1 Tablet by mouth every day. 100 Tablet 0    Continuous Glucose Sensor (FREESTYLE MAITE 14 DAY SENSOR) Misc 1 Each every 14 days. 6 Each 3           EKG: , SR  Brain Imaging: head CT from 2/20/25: age related central and cortical atrophy  EEG: none available           Assessment: Pt reported feeling depressed, helpless and worthless once he got home, starting having multiple falls and was unable to meet his ADLS. This made his feel suicidal. Patient denies any current SI/HI on my evaluation. Denies any SA or gestures at home. He is future oriented and identifies his dog as a reason to live for. He confirms having access to firearm at home. Discussed case with Dr Tripathi who expressed safety concerns with this patient and is seeking extension of his legal hold.      Dx:  MDD, recurrent  R/o cluster B  traits    Medical:    Reviewed, see medical note  Principal Problem:    Suicidal ideation (POA: Yes)  Active Problems:    Urinary retention (POA: Yes)    Type 2 diabetes mellitus (HCC) (POA: Yes)    UTI (urinary tract infection) (POA: Yes)    Frequent falls (POA: Yes)    Orthostatic hypotension (POA: Yes)    Chronic midline back pain (POA: Yes)  Resolved Problems:    * No resolved hospital problems. *       Plan:  Legal hold: extended by Dr Farzana Tripathi  Psychotropic medications: re-start Effexor 225mg PO daily  Ritalin 5mg PO daily   Vitamin D supplementation   Please transfer pt to inpatient psychiatric hospital when medically cleared and bed is available   Discussed the case with: Dr Tripathi  and Dr Arredondo.   Psychiatry will follow up    Thank you for the consult.     Sitter:1:1  Phone:yes  Visitors:yes  Personal belongings: his cell phone    This note was created using voice recognition software (Dragon). The accuracy of the dictation is limited by the abilities of the software. I have reviewed the note prior to signing. However, error related to voice recognition software and /or scribes may still exist and should be interpreted within the appropriate context.

## 2025-03-09 NOTE — ED NOTES
Patient's home medications have been reviewed by the pharmacy team.     Patient's Medications   New Prescriptions    No medications on file   Previous Medications    CALCIUM POLYCARBOPHIL (FIBER) 625 MG TAB    Take 1 Tablet by mouth 3 times a day with meals.    CONTINUOUS GLUCOSE SENSOR (FREESTYLE MAITE 14 DAY SENSOR) MISC    1 Each every 14 days.    GABAPENTIN (NEURONTIN) 300 MG CAP    Take 2 Capsules by mouth 3 times a day.    GLIPIZIDE (GLUCOTROL) 10 MG TAB    Take 1 Tablet by mouth every morning before breakfast.    LIDOCAINE (ASPERFLEX) 4 % PATCH    Place 2 Patches on the skin every 24 hours. wear for 12 hours and remove for 12 hours    MELATONIN 10 MG TAB    Take 1 tablet by mouth every evening.    MELOXICAM (MOBIC) 15 MG TABLET    Take 1 Tablet by mouth every day.    METHOCARBAMOL (ROBAXIN) 750 MG TAB    Take 1 Tablet by mouth 4 times a day.    METHYLPHENIDATE (RITALIN) 5 MG TAB    Take 1 Tablet by mouth every morning with breakfast for 30 days.    MIDODRINE (PROAMATINE) 5 MG TAB    Take 1 Tablet by mouth 3 times a day with meals.    OMEPRAZOLE (PRILOSEC) 40 MG DELAYED-RELEASE CAPSULE    Take 1 Capsule by mouth every day for 200 days.    PIOGLITAZONE (ACTOS) 30 MG TAB    Take 1 Tablet by mouth every day.    SITAGLIPTIN (JANUVIA) 100 MG TAB    Take 1 Tablet by mouth every day.    VENLAFAXINE (EFFEXOR) 75 MG TAB    Take 1 Tablet by mouth every day.    VITAMIN D (CHOLECALCIFEROL) 1000 UNIT TAB    Take 1 Tablet by mouth every day.   Modified Medications    No medications on file   Discontinued Medications    No medications on file         A:  Medications do not appear to be contributing to current complaints.       P:    Home medications have been reordered as appropriate. Daily accuchek while here.        Timmy Chavis, PharmD

## 2025-03-09 NOTE — DISCHARGE PLANNING
Alert Team/Behavioral Health   Note:      Inpt Psych Ref F/U is on daily note (3/9 @ 8:42 AM).      [facility] ALERT TEAM DISCHARGE PLANNING NOTE    Date: 3/9/25  Patient Name:  Christoph Taylor - 59 y.o. - Discharge Planning  MRN:  [id number]   YOB: 1965  ADMISSION DATE:  3/8/2025     Writer forwarded referral packet for inpatient psychiatric care to the following community providers: Nixon Vasquez = [facility] , [place]    Items included in the referral packet:   _x____Face Sheet   _x____Pages 1 and 2 of completed legal hold   _x____Alert Team/Psych Assessment   _x____H&P   _x____UDS   _x____Blood Alcohol   _x____Vital signs   _n/a____Pregnancy Test (if applicable)   _x____Medications List   _____Covid Screen

## 2025-03-09 NOTE — ED NOTES
Pt's personal medications placed in pharmacy bag and LILY tech walked them to pharmacy for storing    Medication bag #[id number]    Medication tag placed in pt's folder

## 2025-03-09 NOTE — ED NOTES
Bedside report received from previous shift.   Assumed patient care. Verified patient identification.  Checked on bed, with unlabored respirations. Discussed plan of care.   Denied any new complaints.     Contraptions: None  Alert and Oriented: x 4  Ambulatory:  Yes  Oxygen Treatment:  None  Pending: UA/ on legal hold with 1:1 sitter

## 2025-03-09 NOTE — ASSESSMENT & PLAN NOTE
Glucose 188 in the ER  Restart his home meds and start a diabetic diet.   HbA1c was 9.8 last month

## 2025-03-09 NOTE — ED NOTES
Patient resting on gurney, equal chest rise and fall, pt in 1:1 observation with sitter in direct view, pt in hospital scrubs, no needs at this time.

## 2025-03-09 NOTE — ED NOTES
Bedside report received from off going RN/tech: Debi RN, assumed care of patient.  POC discussed with patient. Call light within reach, all needs addressed at this time.     Patient resting on gurney, equal chest rise and fall, pt in 1:1 observation with sitter in direct view, pt in hospital scrubs, no needs at this time. Crowley in place, clear yellow urine output  Fall risk interventions in place: Patient's personal possessions are with in their safe reach, Place socks on patient, Place fall risk sign on patient's door, Keep floor surfaces clean and dry, and Human-sitter if tele-sitter fails (all applicable per [place] Fall risk assessment)   Continuous monitoring: Not Applicable   IVF/IV medications: Not Applicable   Oxygen: Room Air  Bedside sitter: Pt on L2k SI with 1:1 sitter Roxanne (name), Report given to sitter, checklist completed, and checklist completed, stop sign in doorway  Isolation: Not Applicable

## 2025-03-09 NOTE — DISCHARGE PLANNING
Alert Team/Behavioral Health   Note:      - [facility] : Talked to Intake Counselor (Edith). Referral is pending review. No beds currently available.    - [facility] : Talked to Paula. Referral is pending review. They will call back when they have an update.    - [facility]: Talked to Babs. Referral is pending review. They will call back when they have an update.    - [facility]: Left voice message.    - [facility] : Talked to Intake Counselor (Kris). Patient declined by doctor due to medical complexity with concern of frequent falls and failure to thrive. Notified Alert Team RN (Leah RIVAS) via Voalte message.    - [facility]: Talked to Yang. Patient declined due to no accepting doctor. Notified Alert Team RN (Leah RIVAS) via Voalte message.  @[id number]: Talked to Henrietta for further inquiry for declination. Referral to be kept on pending list and to be reviewed further tomorrow.

## 2025-03-09 NOTE — ED NOTES
Checked on bed, with unlabored respirations. No safety risk noted  Awake on bed  Sitter - 1:1 with continuous visual monitoring by Trained Personnel  Continued safety precaution  Wolfgangrjuliet in low position, side rail up for pt safety.   No needs identified at the moment

## 2025-03-10 LAB — GLUCOSE BLD STRIP.AUTO-MCNC: 189 MG/DL (ref 65–99)

## 2025-03-10 PROCEDURE — 51798 US URINE CAPACITY MEASURE: CPT

## 2025-03-10 PROCEDURE — 700102 HCHG RX REV CODE 250 W/ 637 OVERRIDE(OP): Performed by: HOSPITALIST

## 2025-03-10 PROCEDURE — 700102 HCHG RX REV CODE 250 W/ 637 OVERRIDE(OP): Performed by: EMERGENCY MEDICINE

## 2025-03-10 PROCEDURE — A9270 NON-COVERED ITEM OR SERVICE: HCPCS | Performed by: PSYCHIATRY & NEUROLOGY

## 2025-03-10 PROCEDURE — A9270 NON-COVERED ITEM OR SERVICE: HCPCS | Performed by: STUDENT IN AN ORGANIZED HEALTH CARE EDUCATION/TRAINING PROGRAM

## 2025-03-10 PROCEDURE — 700102 HCHG RX REV CODE 250 W/ 637 OVERRIDE(OP): Performed by: PSYCHIATRY & NEUROLOGY

## 2025-03-10 PROCEDURE — 82962 GLUCOSE BLOOD TEST: CPT

## 2025-03-10 PROCEDURE — A9270 NON-COVERED ITEM OR SERVICE: HCPCS | Performed by: EMERGENCY MEDICINE

## 2025-03-10 PROCEDURE — 99283 EMERGENCY DEPT VISIT LOW MDM: CPT | Mod: GC | Performed by: PSYCHIATRY & NEUROLOGY

## 2025-03-10 PROCEDURE — A9270 NON-COVERED ITEM OR SERVICE: HCPCS | Performed by: HOSPITALIST

## 2025-03-10 PROCEDURE — 700102 HCHG RX REV CODE 250 W/ 637 OVERRIDE(OP): Performed by: STUDENT IN AN ORGANIZED HEALTH CARE EDUCATION/TRAINING PROGRAM

## 2025-03-10 RX ORDER — LOPERAMIDE HYDROCHLORIDE 2 MG/1
2 CAPSULE ORAL ONCE
Status: COMPLETED | OUTPATIENT
Start: 2025-03-10 | End: 2025-03-10

## 2025-03-10 RX ADMIN — Medication 1000 UNITS: at 06:18

## 2025-03-10 RX ADMIN — GABAPENTIN 300 MG: 300 CAPSULE ORAL at 06:19

## 2025-03-10 RX ADMIN — SITAGLIPTIN 100 MG: 100 TABLET, FILM COATED ORAL at 06:20

## 2025-03-10 RX ADMIN — MIDODRINE HYDROCHLORIDE 5 MG: 5 TABLET ORAL at 08:08

## 2025-03-10 RX ADMIN — LOPERAMIDE HYDROCHLORIDE 2 MG: 2 CAPSULE ORAL at 17:28

## 2025-03-10 RX ADMIN — Medication 10 MG: at 23:27

## 2025-03-10 RX ADMIN — GLIPIZIDE 10 MG: 10 TABLET ORAL at 06:20

## 2025-03-10 RX ADMIN — CEPHALEXIN 500 MG: 500 CAPSULE ORAL at 23:28

## 2025-03-10 RX ADMIN — MIDODRINE HYDROCHLORIDE 5 MG: 5 TABLET ORAL at 17:15

## 2025-03-10 RX ADMIN — RIVAROXABAN 10 MG: 10 TABLET, FILM COATED ORAL at 17:15

## 2025-03-10 RX ADMIN — PIOGLITAZONE 30 MG: 30 TABLET ORAL at 06:19

## 2025-03-10 RX ADMIN — CEPHALEXIN 500 MG: 500 CAPSULE ORAL at 17:15

## 2025-03-10 RX ADMIN — GABAPENTIN 300 MG: 300 CAPSULE ORAL at 12:27

## 2025-03-10 RX ADMIN — CEPHALEXIN 500 MG: 500 CAPSULE ORAL at 12:27

## 2025-03-10 RX ADMIN — METHYLPHENIDATE HYDROCHLORIDE 5 MG: 5 TABLET ORAL at 08:08

## 2025-03-10 RX ADMIN — GABAPENTIN 300 MG: 300 CAPSULE ORAL at 17:15

## 2025-03-10 RX ADMIN — LOPERAMIDE HYDROCHLORIDE 2 MG: 2 CAPSULE ORAL at 09:45

## 2025-03-10 RX ADMIN — CEPHALEXIN 500 MG: 500 CAPSULE ORAL at 06:19

## 2025-03-10 RX ADMIN — CEPHALEXIN 500 MG: 500 CAPSULE ORAL at 00:45

## 2025-03-10 RX ADMIN — TAMSULOSIN HYDROCHLORIDE 0.4 MG: 0.4 CAPSULE ORAL at 08:08

## 2025-03-10 RX ADMIN — VENLAFAXINE HYDROCHLORIDE 225 MG: 75 CAPSULE, EXTENDED RELEASE ORAL at 06:19

## 2025-03-10 RX ADMIN — MIDODRINE HYDROCHLORIDE 5 MG: 5 TABLET ORAL at 12:27

## 2025-03-10 ASSESSMENT — ENCOUNTER SYMPTOMS
FEVER: 0
DIZZINESS: 0
NAUSEA: 0
VOMITING: 0
DIARRHEA: 1
COUGH: 0
CHILLS: 0
HEADACHES: 0
SHORTNESS OF BREATH: 0

## 2025-03-10 NOTE — ED NOTES
Received report from PENNIE Snow. Pt currently asleep in semi-fowlers position on [place]. Respirations even and unlabored.On L2K for SI. Pending placement. Allowed to have cell phone. Cell phone at bedside. Sitter in doorway for direct 1:1 observation. Room safety checklist in use. No needs at this time. Will continue to monitor.

## 2025-03-10 NOTE — ED NOTES
Pt is resting comfortably in bed, respirations unlabored, bed rails up x 2   Room safety check complete. 1:1 sitter observation continued with Staff and in direct view of pt.

## 2025-03-10 NOTE — ED NOTES
Pt's BP 85/53. States he got a bit lightheaded when he got up to use bedside commode. Otherwise denies any other complaints. Dr. Barney notified.

## 2025-03-10 NOTE — ED NOTES
Pt's has one backpack, placed in locker #32 by CHANTALE Rhodes. Already searched by security, per PENNIE Almazan.

## 2025-03-10 NOTE — ED NOTES
Patient resting on stretcher. Vitals stable, equal chest rise and fall. Patient can move independently. Wolfgangrney is locked in lowest position. 1:1 sitter in clear view across from room.

## 2025-03-10 NOTE — DISCHARGE PLANNING
Alert Team Note     Spoke to Maddy at [facility], was advised referral received, but they are unable to verify pt's insurance. Faxed insurance information found in pt's chart to [facility].    Contacted Naeem at , referral was not received. Re faxed referral     Contacted [facility], pt declined due to medical complexity including multiple falls, failure to thrive, and history of urinary retention. Was advised they do not have medical at their facility and pt would not be appropriate for facility     Attempted to contact [facility]. No answer and left confidential VM with call back info provided

## 2025-03-10 NOTE — ED NOTES
Pt provided cell phone with okay from Patient Safety Monitoring orders. 1:1 sitter made aware of cell phone in room. Sitter remains in clear view

## 2025-03-10 NOTE — ED NOTES
Bedside report received from off going RN/tech: Gifty  , assumed care of patient.  POC discussed with patient. Call light within reach, all needs addressed at this time.       Fall risk interventions in place: Patient's personal possessions are with in their safe reach, Keep floor surfaces clean and dry, and Human-sitter if tele-sitter fails (all applicable per [facility] Fall risk assessment)   Continuous monitoring: Not Applicable   IVF/IV medications: Not Applicable   Oxygen: Room Air  Bedside sitter: Pt on L2k SI with 1:1 sitter staff (name)  Isolation: Not Applicable

## 2025-03-10 NOTE — ED NOTES
Assumed care of pt, received bedside report from PENNIE Dhaliwal    Pt is resting comfortably in bed, respirations unlabored, bed rails up x 2   Room safety check complete. 1:1 sitter observation continued with Staff and in direct view of pt.

## 2025-03-10 NOTE — ED NOTES
Bedside report received from off going RN/tech: melina rn, assumed care of patient.  POC discussed with patient. Call light within reach, all needs addressed at this time.       Fall risk interventions in place: Keep floor surfaces clean and dry (all applicable per [place] Fall risk assessment)   Continuous monitoring: Not Applicable   IVF/IV medications: Not Applicable   Oxygen: Room Air  Bedside sitter: Pt on L2k SI with 1:1 sitter   (name)  Isolation: Not Applicable         Previously Declined (within the last year)

## 2025-03-10 NOTE — ED NOTES
Pt resting on gurney in no acute distress. Respirations even and unlabored. Sitter in doorway for direct 1:1 observation. Room safety checklist in use. No needs at this time. Will continue to monitor.

## 2025-03-10 NOTE — CONSULTS
"PSYCHIATRIC FOLLOW-UP:(established)  *Reason for admission:      SI, weakness  *Legal Hold Status:     on hold       Chart reviewed.         *HPI:          Psychiatry last followed up 3/9/25, and at that time, legal hold for danger to self was extended. Psychotropic regimen was restarted consisting of  Effexor 225mg PO daily and Ritalin 5mg PO daily with vitamin D supplementation for recurrent major depressive disorder. Patient adhering to medication regimen, no psychotropic PRNs ordered currently. Per nursing report, he was calm with no behavioral issues overnight.    Reports feeling better. Reports that he had several falls at home with transfers. He was feeling suicidal at home \"better if I didn't wake up\". He has a gun but wasn't thinking about shooting himself. Says that his friend can pick him up tomorrow, and that he has been planning to move to [facility] (one son lives here and friends) for the past few weeks. Friend who is picking him up can lock up gun. Planning to stay with friends and dog in [place]. Reasons to live include 3 sons and dog. Looking forward to moving. Denies issues or adverse effects with medications.    Mood: good  SI/HI: denies, last time few days ago in the setting of living alone  Sleep: poor, attributed to bed and chronic back soreness  Appetite: good  Energy: better, watching news  Guilt/despair: denies    Collateral (friend David) [phone] - friend is planning to pick patient up from hospital and drive him to [place], [place] and patient will be staying with friend until he finds his own place. Guns are in a safe at his friend's house. No other weapons. Friend has no safety concerns regarding discharge plan.     Medical ROS (as pertinent):     Review of Systems   Constitutional:  Negative for chills and fever.   Respiratory:  Negative for cough and shortness of breath.    Cardiovascular:  Negative for chest pain.   Gastrointestinal:  Positive for diarrhea. Negative for nausea " and vomiting.   Neurological:  Negative for dizziness and headaches.       *Psychiatric Examination:  Vitals:    03/10/25 0600   BP: 102/76   Pulse: 82   Resp: 15   Temp:    SpO2: 98%     Appearance: appears stated age, fair grooming and hygiene, calm, cooperative, good eye contact, in hospital garb  Abnormal movements: none  Gait/posture: normal  Speech: normal volume, tone and rhythm  Though process: linear and goal oriented  Associations: no loose associations  Thought content: denies AVH, no delusions or paranoia elicited, does not appear to be responding to internal stimuli, neither is internally preoccupied.   Judgement and Insight: fair/fair  Orientation:oriented to person, place, time and situation  Recent and Remote Memory: intact  Attention Span and Concentration: intact  Language: fluid   Fund of Knowledge: not tested   Mood and Affect:, euthymic, appropriate   SI/HI:denies any active or passive SI/HI    EKG:   Results for orders placed or performed during the hospital encounter of 02/10/25   EKG   Result Value Ref Range    Report       [facility] Emergency Dept.    Test Date:  2025-02-10  Pt Name:    BAILEE JACOBSON               Department: ER  MRN:        [id number]                      Room:       [facility]  Gender:     Male                         Technician: [id number]  :        1965                   Requested By:ER TRIAGE PROTOCOL  Order #:    [id number]                    Reading MD: MERISSA TORRES, DO    Measurements  Intervals                                Axis  Rate:       70                           P:          61  OR:         169                          QRS:        60  QRSD:       91                           T:          70  QT:         420  QTc:        454    Interpretive Statements  Sinus rhythm  Borderline low voltage, extremity leads  Compared to ECG 2025 12:50:26  No significant changes  Electronically Signed On 02- 16:26:09 PST by MERISSA HINES  DO MELISSA       *Imaging:  head CT from 2/20/25: age related central and cortical atrophy      EEG:  none available       *Labs personally reviewed:   No results found for this or any previous visit (from the past 24 hours).    Current medications:  Current Facility-Administered Medications   Medication Dose Route Frequency Provider Last Rate Last Admin    loperamide (Imodium) capsule 2 mg  2 mg Oral Once Hlilary Schroeder M.D.        tamsulosin (Flomax) capsule 0.4 mg  0.4 mg Oral AFTER BREAKFAST Sukhwinder LAURA Gutierrez   0.4 mg at 03/10/25 0808    cephALEXin (Keflex) capsule 500 mg  500 mg Oral Q6HRS Sukhwinder LAURA Gutierrez   500 mg at 03/10/25 0619    glipiZIDE (Glucotrol) tablet 10 mg  10 mg Oral QAM AC Bong Hale M.D.   10 mg at 03/10/25 0620    melatonin tablet 10 mg  10 mg Oral Nightly Bong Hale M.D.   10 mg at 03/09/25 2105    methylphenidate (Ritalin) tablet 5 mg  5 mg Oral QDAY with Breakfast Bong Hale M.D.   5 mg at 03/10/25 0808    midodrine (Proamatine) tablet 5 mg  5 mg Oral TID WITH MEALS Bong Hale M.D.   5 mg at 03/10/25 0808    SITagliptin (Januvia) tablet 100 mg  100 mg Oral DAILY Bong Hale M.D.   100 mg at 03/10/25 0620    pioglitazone (Actos) tablet 30 mg  30 mg Oral DAILY Bong Hale M.D.   30 mg at 03/10/25 0619    rivaroxaban (Xarelto) tablet 10 mg  10 mg Oral DAILY AT 1800 Bong Hale M.D.   10 mg at 03/09/25 1744    acetaminophen (Tylenol) tablet 650 mg  650 mg Oral Q6HRS PRN Bong Hale M.D.   650 mg at 03/09/25 1449    senna-docusate (Pericolace Or Senokot S) 8.6-50 MG per tablet 2 Tablet  2 Tablet Oral Q EVENING Bong Hale M.D.   2 Tablet at 03/09/25 1743    And    polyethylene glycol/lytes (Miralax) Packet 1 Packet  1 Packet Oral QDAY PRN Bong Hale M.D.        gabapentin (Neurontin) capsule 300 mg  300 mg Oral TID Bong Hale M.D.   300 mg at 03/10/25 0619    venlafaxine XR (Effexor XR) capsule 225 mg  225 mg Oral DAILY Niki Sage M.D.   225 mg at  03/10/25 0619    vitamin D3 (Cholecalciferol) tablet 1,000 Units  1,000 Units Oral DAILY Niki Sage M.D.   1,000 Units at 03/10/25 0618     Current Outpatient Medications   Medication Sig Dispense Refill    methylphenidate (RITALIN) 5 MG Tab Take 1 Tablet by mouth every morning with breakfast for 30 days. 30 Tablet 0    gabapentin (NEURONTIN) 300 MG Cap Take 2 Capsules by mouth 3 times a day. 540 Capsule 0    glipiZIDE (GLUCOTROL) 10 MG Tab Take 1 Tablet by mouth every morning before breakfast. 100 Tablet 1    omeprazole (PRILOSEC) 40 MG delayed-release capsule Take 1 Capsule by mouth every day for 200 days. 100 Capsule 1    pioglitazone (ACTOS) 30 MG Tab Take 1 Tablet by mouth every day. 100 Tablet 1    SITagliptin (JANUVIA) 100 MG Tab Take 1 Tablet by mouth every day. 100 Tablet 1    venlafaxine (EFFEXOR) 75 MG Tab Take 1 Tablet by mouth every day. 100 Tablet 1    Calcium Polycarbophil (FIBER) 625 MG Tab Take 1 Tablet by mouth 3 times a day with meals. (Patient not taking: Reported on 3/9/2025) 300 Tablet 0    lidocaine (ASPERFLEX) 4 % Patch Place 2 Patches on the skin every 24 hours. wear for 12 hours and remove for 12 hours (Patient not taking: Reported on 3/9/2025) 200 Patch 0    Melatonin 10 MG Tab Take 1 tablet by mouth every evening. 100 Tablet 0    methocarbamol (ROBAXIN) 750 MG Tab Take 1 Tablet by mouth 4 times a day. 360 Tablet 0    meloxicam (MOBIC) 15 MG tablet Take 1 Tablet by mouth every day. 100 Tablet 0    midodrine (PROAMATINE) 5 MG Tab Take 1 Tablet by mouth 3 times a day with meals. 270 Tablet 0    vitamin D (CHOLECALCIFEROL) 1000 UNIT Tab Take 1 Tablet by mouth every day. 100 Tablet 0    Continuous Glucose Sensor (FREESTYLE MAITE 14 DAY SENSOR) Misc 1 Each every 14 days. 6 Each 3       Assessment:  No major changes compared to yesterday. Reports that he felt suicidal at home after multiple falls but denies suicide attempt, preparation or gestures. Patient reports good mood, denies  SI/HI/AVH. No psychotic thought content. He is future oriented. Affect is brighter. Identifies sons and dog as reasons to live. If he is discharged, he would still like to be picked up by his friend David and taken to [place] where he is moving to and where one of his sons live. Called friend David who corroborated patient's story and denied safety concerns; he endorsed having guns at home but they will be locked in a safe. Patient has noted no adverse medication effects. Will plan to change monitoring to tele sitter OK. Plan to complete safety plan tomorrow.    Dx:  Major depressive disorder, recurrent  R/o cluster B traits     Medical :  Principal Problem:    Suicidal ideation (POA: Yes)  Active Problems:    Urinary retention (POA: Yes)    Type 2 diabetes mellitus (HCC) (POA: Yes)    UTI (urinary tract infection) (POA: Yes)    Frequent falls (POA: Yes)    Orthostatic hypotension (POA: Yes)    Chronic midline back pain (POA: Yes)  Resolved Problems:    * No resolved hospital problems. *     Plan:  1- Legal hold: on hold, extended  2- Psychotropic medications  Continue  Effexor 225mg PO daily  Continue Ritalin 5mg PO daily   3- Please transfer pt to inpatient psychiatric hospital when medically cleared and bed is available  5- Labs /reviewed:  6- EKG /reviewed  7- Old records /reviewed/summarized  8- Collateral obtained: friend David  12- Discussed the case with: Dr Messer  13- Psychiatry will follow up    Thank you for the consult.     Sitter: tele sitter OK  Phone:yes  Visitors:yes  Personal belongings: cell phone

## 2025-03-10 NOTE — PROGRESS NOTES
ED Observation Progress Note    Date of Service: 03/10/25    Interval History and Interventions  Patient was seen in this emergency department, presenting last night initially for evaluation of weakness.  He has a past medical history significant for type 2 diabetes, degenerative disc disease, diabetic retinopathy, neuropathy, navicular fracture of the foot, and history of failure to thrive.  He was hospitalized for nearly a month, 1/9/2025 to 2/7/2025 for increasing weakness.  Most recently he was admitted to/10/25 to 3/7/2025 for urinary retention and weakness.  He was discharged on the day prior to his admission.  States that he has fallen at home while attempting to move from his wheelchair.  Also reported feeling suicidal, and reported gun ownership with a plan to shoot himself.       Lab work and imaging were unremarkable.     He was placed on a legal hold by emergency physician, due to high risk history, and access to a firearm at home.     He was seen and evaluated by behavioral health last night,, behavioral health RN is in agreement that the patient is an imminent safety risk, is in agreement with legal hold placement with transfer for inpatient behavioral health stabilization.     I reviewed his discharge summary from 3/7/2025, noted that he was admitted for chronic back pain with history of multiple laminectomies and fractures, during this admission MRI of the spine was done which demonstrated stable findings, neurosurgery recommended nonoperative management.  He was recently treated for pneumonia, declined by skilled nursing facility, discharged home with home health services and in-home caregiver.  He expressed suicidal ideation while hospitalized, initially placed on legal hold which was ultimately discontinued by the psychiatry team.  He was placed on Ritalin and Effexor with good effect.  Also briefly had a Crowley catheter in place which was since discontinued.  He was discharged home with a plan to  stay with a friend in [facility].    His Crowley catheter was discontinued today, with plans for recheck to verify no urinary retention.  Order placed for self-catheterization teaching. He had 2 episodes of loose stool today, no severe watery stool, is requesting a dose of Imodium. He has no black stools, no tar-like stools, no nausea or vomiting, no abdominal pain. Single dose of Imodium ordered.  He was again seen by psychiatry today, petition filed to extend legal hold.  Patient declined at [facility] due to medical complexity.  He remains in ED observation status at this time.    Physical Exam  BP 98/63   Pulse 85   Temp 36.6 °C (97.9 °F) (Temporal)   Resp 17   Ht 1.829 m (6')   Wt 72.6 kg (160 lb)   SpO2 98%   BMI 21.70 kg/m² .    Constitutional: Awake and alert. Afebrile.  HENT:  Atraumatic, normocephalic  Eyes: Normal conjunctiva, no scleral icterus, no periorbital edema  Neck: Normal and painless range of motion, supple  Cardiovascular: No tachycardia, regular rhythm  Thorax & Lungs: Normal work of breathing, no tachypnea  Abdomen: Nondistended  Skin:  Warm, dry, intact  Extremities: No abnormal swelling nor deformity  Psychiatric: Calm and cooperative, continues to report suicidal thoughts    Labs  Results for orders placed or performed during the hospital encounter of 03/08/25   POC BREATHALIZER    Collection Time: 03/08/25  9:15 PM   Result Value Ref Range    POC Breathalizer 0.00 0.00 - 0.01 Percent   CBC WITH DIFFERENTIAL    Collection Time: 03/08/25  9:35 PM   Result Value Ref Range    WBC 4.6 (L) 4.8 - 10.8 K/uL    RBC 4.02 (L) 4.70 - 6.10 M/uL    Hemoglobin 10.2 (L) 14.0 - 18.0 g/dL    Hematocrit 31.3 (L) 42.0 - 52.0 %    MCV 77.9 (L) 81.4 - 97.8 fL    MCH 25.4 (L) 27.0 - 33.0 pg    MCHC 32.6 32.3 - 36.5 g/dL    RDW 40.9 35.9 - 50.0 fL    Platelet Count 202 164 - 446 K/uL    MPV 8.3 (L) 9.0 - 12.9 fL    Neutrophils-Polys 68.10 44.00 - 72.00 %    Lymphocytes 22.00 22.00 - 41.00 %     Monocytes 7.80 0.00 - 13.40 %    Eosinophils 1.70 0.00 - 6.90 %    Basophils 0.20 0.00 - 1.80 %    Immature Granulocytes 0.20 0.00 - 0.90 %    Nucleated RBC 0.00 0.00 - 0.20 /100 WBC    Neutrophils (Absolute) 3.15 1.82 - 7.42 K/uL    Lymphs (Absolute) 1.02 1.00 - 4.80 K/uL    Monos (Absolute) 0.36 0.00 - 0.85 K/uL    Eos (Absolute) 0.08 0.00 - 0.51 K/uL    Baso (Absolute) 0.01 0.00 - 0.12 K/uL    Immature Granulocytes (abs) 0.01 0.00 - 0.11 K/uL    NRBC (Absolute) 0.00 K/uL   COMP METABOLIC PANEL    Collection Time: 03/08/25  9:35 PM   Result Value Ref Range    Sodium 140 135 - 145 mmol/L    Potassium 3.9 3.6 - 5.5 mmol/L    Chloride 102 96 - 112 mmol/L    Co2 29 20 - 33 mmol/L    Anion Gap 9.0 7.0 - 16.0    Glucose 188 (H) 65 - 99 mg/dL    Bun 20 8 - 22 mg/dL    Creatinine 0.65 0.50 - 1.40 mg/dL    Calcium 8.8 8.5 - 10.5 mg/dL    Correct Calcium 9.2 8.5 - 10.5 mg/dL    AST(SGOT) 15 12 - 45 U/L    ALT(SGPT) 12 2 - 50 U/L    Alkaline Phosphatase 79 30 - 99 U/L    Total Bilirubin 0.2 0.1 - 1.5 mg/dL    Albumin 3.5 3.2 - 4.9 g/dL    Total Protein 6.2 6.0 - 8.2 g/dL    Globulin 2.7 1.9 - 3.5 g/dL    A-G Ratio 1.3 g/dL   DIAGNOSTIC ALCOHOL    Collection Time: 03/08/25  9:35 PM   Result Value Ref Range    Diagnostic Alcohol <10.1 <10.1 mg/dL   ESTIMATED GFR    Collection Time: 03/08/25  9:35 PM   Result Value Ref Range    GFR (CKD-EPI) 107 >60 mL/min/1.73 m 2   URINE DRUG SCREEN    Collection Time: 03/09/25  5:00 AM   Result Value Ref Range    Amphetamines Urine Negative Negative    Barbiturates Negative Negative    Benzodiazepines Negative Negative    Cocaine Metabolite Negative Negative    Fentanyl, Urine Negative Negative    Methadone Negative Negative    Opiates Negative Negative    Oxycodone Positive (A) Negative    Phencyclidine -Pcp Negative Negative    Propoxyphene Negative Negative    Cannabinoid Metab Negative Negative   Urinalysis, Culture if Indicated    Collection Time: 03/09/25  5:00 AM    Specimen: Urine    Result Value Ref Range    Color Yellow     Character Cloudy (A)     Specific Gravity 1.014 <1.035    Ph 6.0 5.0 - 8.0    Glucose Negative Negative mg/dL    Ketones Negative Negative mg/dL    Protein 300 (A) Negative mg/dL    Bilirubin Negative Negative    Urobilinogen, Urine 0.2 <=1.0 EU/dL    Nitrite Negative Negative    Leukocyte Esterase Moderate (A) Negative    Occult Blood Small (A) Negative    Micro Urine Req Microscopic    URINE MICROSCOPIC (W/UA)    Collection Time: 03/09/25  5:00 AM   Result Value Ref Range    WBC  (A) /hpf    RBC 3-5 (A) 0 - 2 /hpf    Bacteria Many (A) None /hpf    Epithelial Cells 0-2 0 - 5 /hpf    Urine Casts 0-2 0 - 2 /lpf   URINE CULTURE(NEW)    Collection Time: 03/09/25  5:00 AM    Specimen: Urine   Result Value Ref Range    Significant Indicator POS (POS)     Source UR     Site -     Culture Result - (A)     Culture Result (A)      Escherichia coli  >100,000 cfu/mL  Susceptibilities in progress         Radiology  DX-FOOT-COMPLETE 3+ LEFT   Final Result         1.  No acute traumatic bony injury.   2.  Severe degenerative changes of the midfoot   3.  Pes planus          Problem List  1. Suicidal ideation    2. Fall, initial encounter    3. Urinary retention    4. Acute cystitis without hematuria

## 2025-03-11 ENCOUNTER — PHARMACY VISIT (OUTPATIENT)
Dept: PHARMACY | Facility: MEDICAL CENTER | Age: 60
End: 2025-03-11
Payer: COMMERCIAL

## 2025-03-11 VITALS
OXYGEN SATURATION: 95 % | SYSTOLIC BLOOD PRESSURE: 127 MMHG | WEIGHT: 160 LBS | HEIGHT: 72 IN | HEART RATE: 78 BPM | RESPIRATION RATE: 16 BRPM | DIASTOLIC BLOOD PRESSURE: 85 MMHG | BODY MASS INDEX: 21.67 KG/M2 | TEMPERATURE: 98 F

## 2025-03-11 LAB
ANION GAP SERPL CALC-SCNC: 9 MMOL/L (ref 7–16)
BACTERIA UR CULT: ABNORMAL
BACTERIA UR CULT: ABNORMAL
BASOPHILS # BLD AUTO: 0.2 % (ref 0–1.8)
BASOPHILS # BLD: 0.01 K/UL (ref 0–0.12)
BUN SERPL-MCNC: 31 MG/DL (ref 8–22)
CALCIUM SERPL-MCNC: 8.6 MG/DL (ref 8.5–10.5)
CHLORIDE SERPL-SCNC: 103 MMOL/L (ref 96–112)
CO2 SERPL-SCNC: 25 MMOL/L (ref 20–33)
CREAT SERPL-MCNC: 0.84 MG/DL (ref 0.5–1.4)
EOSINOPHIL # BLD AUTO: 0.15 K/UL (ref 0–0.51)
EOSINOPHIL NFR BLD: 2.7 % (ref 0–6.9)
ERYTHROCYTE [DISTWIDTH] IN BLOOD BY AUTOMATED COUNT: 43.5 FL (ref 35.9–50)
GFR SERPLBLD CREATININE-BSD FMLA CKD-EPI: 99 ML/MIN/1.73 M 2
GLUCOSE BLD STRIP.AUTO-MCNC: 224 MG/DL (ref 65–99)
GLUCOSE SERPL-MCNC: 236 MG/DL (ref 65–99)
HCT VFR BLD AUTO: 32.5 % (ref 42–52)
HGB BLD-MCNC: 10.2 G/DL (ref 14–18)
IMM GRANULOCYTES # BLD AUTO: 0.01 K/UL (ref 0–0.11)
IMM GRANULOCYTES NFR BLD AUTO: 0.2 % (ref 0–0.9)
LYMPHOCYTES # BLD AUTO: 0.93 K/UL (ref 1–4.8)
LYMPHOCYTES NFR BLD: 17 % (ref 22–41)
MCH RBC QN AUTO: 25.2 PG (ref 27–33)
MCHC RBC AUTO-ENTMCNC: 31.4 G/DL (ref 32.3–36.5)
MCV RBC AUTO: 80.4 FL (ref 81.4–97.8)
MONOCYTES # BLD AUTO: 0.32 K/UL (ref 0–0.85)
MONOCYTES NFR BLD AUTO: 5.9 % (ref 0–13.4)
NEUTROPHILS # BLD AUTO: 4.05 K/UL (ref 1.82–7.42)
NEUTROPHILS NFR BLD: 74 % (ref 44–72)
NRBC # BLD AUTO: 0 K/UL
NRBC BLD-RTO: 0 /100 WBC (ref 0–0.2)
PLATELET # BLD AUTO: 198 K/UL (ref 164–446)
PMV BLD AUTO: 8.7 FL (ref 9–12.9)
POTASSIUM SERPL-SCNC: 4.2 MMOL/L (ref 3.6–5.5)
PROCALCITONIN SERPL-MCNC: 0.08 NG/ML
RBC # BLD AUTO: 4.04 M/UL (ref 4.7–6.1)
SIGNIFICANT IND 70042: ABNORMAL
SITE SITE: ABNORMAL
SODIUM SERPL-SCNC: 137 MMOL/L (ref 135–145)
SOURCE SOURCE: ABNORMAL
WBC # BLD AUTO: 5.5 K/UL (ref 4.8–10.8)

## 2025-03-11 PROCEDURE — 85025 COMPLETE CBC W/AUTO DIFF WBC: CPT

## 2025-03-11 PROCEDURE — A9270 NON-COVERED ITEM OR SERVICE: HCPCS | Performed by: HOSPITALIST

## 2025-03-11 PROCEDURE — 36415 COLL VENOUS BLD VENIPUNCTURE: CPT

## 2025-03-11 PROCEDURE — 51798 US URINE CAPACITY MEASURE: CPT

## 2025-03-11 PROCEDURE — 700102 HCHG RX REV CODE 250 W/ 637 OVERRIDE(OP): Performed by: HOSPITALIST

## 2025-03-11 PROCEDURE — 99283 EMERGENCY DEPT VISIT LOW MDM: CPT | Mod: GC | Performed by: PSYCHIATRY & NEUROLOGY

## 2025-03-11 PROCEDURE — 700102 HCHG RX REV CODE 250 W/ 637 OVERRIDE(OP): Performed by: STUDENT IN AN ORGANIZED HEALTH CARE EDUCATION/TRAINING PROGRAM

## 2025-03-11 PROCEDURE — A9270 NON-COVERED ITEM OR SERVICE: HCPCS | Performed by: PSYCHIATRY & NEUROLOGY

## 2025-03-11 PROCEDURE — A9270 NON-COVERED ITEM OR SERVICE: HCPCS | Performed by: STUDENT IN AN ORGANIZED HEALTH CARE EDUCATION/TRAINING PROGRAM

## 2025-03-11 PROCEDURE — 80048 BASIC METABOLIC PNL TOTAL CA: CPT

## 2025-03-11 PROCEDURE — 82962 GLUCOSE BLOOD TEST: CPT

## 2025-03-11 PROCEDURE — RXMED WILLOW AMBULATORY MEDICATION CHARGE: Performed by: EMERGENCY MEDICINE

## 2025-03-11 PROCEDURE — 700102 HCHG RX REV CODE 250 W/ 637 OVERRIDE(OP): Performed by: PSYCHIATRY & NEUROLOGY

## 2025-03-11 PROCEDURE — 84145 PROCALCITONIN (PCT): CPT

## 2025-03-11 PROCEDURE — 90832 PSYTX W PT 30 MINUTES: CPT | Performed by: SOCIAL WORKER

## 2025-03-11 RX ORDER — SULFAMETHOXAZOLE AND TRIMETHOPRIM 800; 160 MG/1; MG/1
1 TABLET ORAL EVERY 12 HOURS
Status: DISCONTINUED | OUTPATIENT
Start: 2025-03-11 | End: 2025-03-11

## 2025-03-11 RX ORDER — CEPHALEXIN 500 MG/1
500 CAPSULE ORAL 4 TIMES DAILY
Qty: 24 CAPSULE | Refills: 0 | Status: ACTIVE | OUTPATIENT
Start: 2025-03-11 | End: 2025-03-17

## 2025-03-11 RX ADMIN — GLIPIZIDE 10 MG: 10 TABLET ORAL at 06:47

## 2025-03-11 RX ADMIN — MIDODRINE HYDROCHLORIDE 5 MG: 5 TABLET ORAL at 07:22

## 2025-03-11 RX ADMIN — CEPHALEXIN 500 MG: 500 CAPSULE ORAL at 11:18

## 2025-03-11 RX ADMIN — TAMSULOSIN HYDROCHLORIDE 0.4 MG: 0.4 CAPSULE ORAL at 09:53

## 2025-03-11 RX ADMIN — PIOGLITAZONE 30 MG: 30 TABLET ORAL at 06:47

## 2025-03-11 RX ADMIN — SITAGLIPTIN 100 MG: 100 TABLET, FILM COATED ORAL at 06:47

## 2025-03-11 RX ADMIN — VENLAFAXINE HYDROCHLORIDE 225 MG: 75 CAPSULE, EXTENDED RELEASE ORAL at 06:47

## 2025-03-11 RX ADMIN — Medication 1000 UNITS: at 09:53

## 2025-03-11 RX ADMIN — CEPHALEXIN 500 MG: 500 CAPSULE ORAL at 06:47

## 2025-03-11 RX ADMIN — GABAPENTIN 300 MG: 300 CAPSULE ORAL at 06:46

## 2025-03-11 RX ADMIN — METHYLPHENIDATE HYDROCHLORIDE 5 MG: 5 TABLET ORAL at 06:46

## 2025-03-11 RX ADMIN — GABAPENTIN 300 MG: 300 CAPSULE ORAL at 11:18

## 2025-03-11 RX ADMIN — MIDODRINE HYDROCHLORIDE 5 MG: 5 TABLET ORAL at 11:18

## 2025-03-11 ASSESSMENT — ENCOUNTER SYMPTOMS
CHILLS: 0
FEVER: 0
DIZZINESS: 0
COUGH: 0
SHORTNESS OF BREATH: 0
VOMITING: 0
DIARRHEA: 0
NAUSEA: 0
HEADACHES: 0

## 2025-03-11 ASSESSMENT — ANXIETY QUESTIONNAIRES
3. WORRYING TOO MUCH ABOUT DIFFERENT THINGS: NEARLY EVERY DAY
GAD7 TOTAL SCORE: 14
IF YOU CHECKED OFF ANY PROBLEMS ON THIS QUESTIONNAIRE, HOW DIFFICULT HAVE THESE PROBLEMS MADE IT FOR YOU TO DO YOUR WORK, TAKE CARE OF THINGS AT HOME, OR GET ALONG WITH OTHER PEOPLE: VERY DIFFICULT
5. BEING SO RESTLESS THAT IT IS HARD TO SIT STILL: NOT AT ALL
6. BECOMING EASILY ANNOYED OR IRRITABLE: NEARLY EVERY DAY
4. TROUBLE RELAXING: MORE THAN HALF THE DAYS
2. NOT BEING ABLE TO STOP OR CONTROL WORRYING: NEARLY EVERY DAY
1. FEELING NERVOUS, ANXIOUS, OR ON EDGE: SEVERAL DAYS
7. FEELING AFRAID AS IF SOMETHING AWFUL MIGHT HAPPEN: MORE THAN HALF THE DAYS

## 2025-03-11 ASSESSMENT — PATIENT HEALTH QUESTIONNAIRE - PHQ9
SUM OF ALL RESPONSES TO PHQ QUESTIONS 1-9: 19
CLINICAL INTERPRETATION OF PHQ2 SCORE: 4

## 2025-03-11 NOTE — ED NOTES
Pt consumed 100% of breakfast, sitter remains in LOS for Legal 1:1 obs hold, fall precautions in place

## 2025-03-11 NOTE — DISCHARGE PLANNING
Per Pt request writer called [facility] [phone]. She states they are on their way here, are aset to arrive in two hours from [place], [place].  Bedside RN updated.

## 2025-03-11 NOTE — ED NOTES
Pt dressing self. Pt called friend in [place] and they will be picking friend/pt up around 1400.

## 2025-03-11 NOTE — ED NOTES
0724 Dr. Allen notified lactic 9.2 and a blood sugar of 14. Orders for a 500ml bolus and I amp of dextrose. Increased fluids to 100ml. ID was consulted.    0900 Dr. Strauss notified sugars still running low switched to ns 75 with 5% of dextrose.    1539. Repeat lactic 9.4. Dr Dyson notified. No new orders will continue to monitor.     Report given to PENNIE Trujillo

## 2025-03-11 NOTE — ED NOTES
Report received from PENNIE Lacey. Sitter at bedside in LOS for 1:1 obs, legal hold continued. Fall precautions in place

## 2025-03-11 NOTE — DISCHARGE PLANNING
Alert Team Note     Spoke to Maddy at [facility], confirmed pt is still pending placement. Was advised Maddy is still unable to verify pt's insurance. Re faxed insurance verification found in pt's chart to [facility].

## 2025-03-11 NOTE — CONSULTS
"[facility] Care Assessment Follow up     Name: Christoph Taylor  MRN: [id number]  : 1965  Age: 59 y.o.  Date of assessment: 25  PCP: DIANA Tariq  Persons in attendance: Patient    RAY, Per Medical Record:  \"59 y.o. male who presented 3/8/2025 with back pain, falls, weakness, suicidal ideation.   Mr. Taylor has a medical history of diabetes mellitus, chronic back pain recent surgery by Dr. Pulido lumbar region 2024, hypotension, failure to thrive, numerous admissions for suicidal ideation that was most recent admitted to this hospital from 2/10/2025 3/7/2025 was on a legal hold eventually this was lifted by psychiatry team.  He was placed on effect support and Ritalin.  During the hospitalization he did have a Crowley catheter temporarily placed on Flomax and doxazosin but they were stopped due to orthostatic hypotension and was placed on midodrine.  Eventually off those medications he was still able to continuously.  He was discharged with plan of moving to [facility] to be closer to family.  He had a fall was unable to care for himself presented again with suicidal ideation for which she has been placed on hold.  Emergency room he was not able to urinate therefore was placed urinalysis was positive he is empirically started on oral Keflex.\" Patient was referred to Behavioral Health for a psychotherapy session.    CHIEF COMPLAINT/PRESENTING ISSUE (as stated by Patient): Christoph Taylor is a 59 year old male seen lying on hospital bed alert and oriented. Patient's speech was clear and coherent. Patient reports no auditory or visual hallucinations. Patient shows no signs of a thought disorder. Clinician had to speak at an elevated and loud voice to accommodate for patients hearing loss. Patient was pleasant and willing to engage in interview process.    Clinician met with patient alongside Dr. Sage and Dr. Fernandez to review safety plan and discharge. Patient " "denies suicidal or homicidal ideation and intends to limit access to his gun by allowing his friend to lock it away in a gun safe. Patient reports his friend is on his way to [place] from [place] to pick him up. Dr. Sage and Dr. Fernandez lifted the Legal Hold. After Dr. Sage and Dr. Fernandez reviewed safety plan and lifted the Legal Hold, clinician had a psychotherapy session with patient.    Patient contradicted his prior story of being discharged from the hospital on 3/8/25. Per Dr. Tripathi's note on 3/9/25: \"I didn't have a choice, I was discharged late in the day and I did not know if I could drive myself anywhere for that long a distance or at night\". Today, patient reports believing he could've taken care of himself over the weekend before his friend picked him up today (3/11/25). However, after being discharged to his home, he realized he was unable to walk and take care of himself alone, resulting in him calling the EMT's. Clinician discussed the importance of patient advocating for himself prior to discharge, and waiting for his friend to assist him. Patient was in high spirits this morning, reporting his friend has helped him in the past with care giving. Clinician discussed a thorough plan post-discharge including who was picking him up, where he was going, and places he can stop during his drive to [place] if needed.    Clinician challenged patient on his recurrent suicidal ideation. Patient states he was feeling hopeless after being home alone, stating, \"I wasn't feeling like I wanted to kill myself, just that I would prefer if I didn't wake up\". Patient identified reasons to live including his friend, his dog, and his children. Patient requested cell phone use to contact his friend and get an update of when they would be arriving at the hospital.    Clinician utilized Cognitive Behavioral therapy techniques such as cognitive restructuring to think of different ways to deal with distressful " situations when they occur again in the future. Clinician focused on SMART goals with patient to coordinate an effective discharge plan. Patient expressed excitement and hopefulness for his SMART goals, including getting his cell phone activated and his move to [place]. Patient no longer shows signs of suicidal or homicidal ideation and is future-focused at the time of this interview.  Chief Complaint   Patient presents with    Suicidal Ideation     Pt states he has a gun at home and thoughts to harm himself     Weakness     Pt BIB EMS from home for failure to thrive. Pt lives alone and has had multiple falls, feels generalized weakness throughout his body        Psychiatric Review of Systems    BHUMI-7 Questionnaire  Feeling nervous, anxious, or on edge:  Several days   Not being able to sop or control worrying:  Nearly every day   Worrying too much about different things:  Nearly every day   Trouble relaxing:  More than half the days   Being so restless that it's hard to sit still:  Not at all   Becoming easily annoyed or irritable:  Nearly every day   Feeling afraid as if something awful might happen:  More than half the days   Total:  14   How difficult  have these problems made it for you to do your work, take care of things at home, or get along with other people? - Very difficult    Interpretation of BHUMI 7 Total Score   Score Severity: 0-4 No Anxiety, 5-9 Mild Anxiety, 10-14 Moderate Anxiety, 15-21 Severe Anxiety    PHQ-9 Depression Screening    Little interest or pleasure in doing things?  2 - more than half the days   Feeling down, depressed, or hopeless?  2 - more than half the days   Trouble falling or staying asleep, or sleeping too much?   2 - more than half the days   Feeling tired or having little energy?  2 - more than half the days   Poor appetite or overeating?   2 - more than half the days   Feeling bad about yourself - or that you are a failure or have let yourself or your family down?  3 -  nearly every day   Trouble concentrating on things, such as reading the newspaper or watching television?  2 - more than half the days   Moving or speaking so slowly that other people could have noticed.  Or the opposite - being so fidgety or restless that you have been moving around a lot more than usual?   2 - more than half the days   Thoughts that you would be better off dead, or of hurting yourself?   2 - more than half the days   Patient Health Questionnaire Score:  19     Interpretation of PHQ-9 Total Score   Score Severity: 1-4 No Depression, 5-9 Mild Depression, 10-14 Moderate Depression, 15-19 Moderately Severe Depression, 20-27 Severe Depression    MoCA Performed?:  N/A    SAFETY ASSESSMENT - SELF  Does patient acknowledge current or past symptoms of dangerousness to self? yes   Does parent/significant other report patient has current or past symptoms of dangerousness to self? N\A     Does presenting problem suggest symptoms of dangerousness to self? No      SAFETY ASSESSMENT - OTHERS  Does patient acknowledge current or past symptoms of aggressive behavior or risk to others? yes   Does parent/significant other report patient has current or past symptoms of aggressive behavior or risk to others? N\A     Does presenting problem suggest symptoms of dangerousness to others?  No    Crisis Safety Plan completed and copy given to patient? yes      MENTAL STATUS  Participation Active verbal participation   Grooming Disheveled   Orientation Alert   Behavior Tense   Eye contact Limited   Mood Depressed   Affect Anxious   Thought Process Circumstantial   Thought Content Within normal limits   Speech Loud   Perception Within normal limits   Memory No gross evidence of memory deficits   Insight Limited   Judgement Limited   Other      Collateral information:   Source: [facility] and  Other: Psychiatry team (Dr. Sage and Dr. Fernandez)    Unable to complete full assessment due to:  NA - Assessment  completed    CLINICAL IMPRESSIONS:  Primary:  Major Depressive Disorder, Severe  Secondary:  R/O Cluster B Personality traits                                       Legal Hold: Lifted    Joon Anderson, Student  Farzana Tripathi, Ph.D., [facility]  3/11/2025    Length of Intervention:  30 minutes

## 2025-03-11 NOTE — ED NOTES
Pt denies needs at this time, sitter remains in place for 1 :1 obs, pt provided with breakfast tray

## 2025-03-11 NOTE — ED NOTES
Pt resting in bed, breathing even and unlabored with Sitter in LOS for continued OBS for legal hold

## 2025-03-11 NOTE — CONSULTS
"    [facility]  Crisis/Safety Plan    Name:  Christoph Taylor  MRN:  [id number]  Date:  3/11/2025    Warning signs that a crisis may be developing for me or I may be at risk:  1) feeling anxious  2) feeling depressed  3) Sweating    Coping strategies I can use on my own (relaxation, physical activity, etc):  1) breathing techniques, deep breathes  2) take a time off  3) go for a walk.    Ways I can make my environment safe:  1) have friend around  2) have friend hold onto medications.  3) have friends put away knives and guns in safe place    Things I want to tell myself when I feel a crisis developin) \"it's only temporary\"  2) \"there's a solution to every problem\"  3) \"go through my three steps\"    People I can contact for support or distraction (and their phone numbers):  1) Son [phone]  2) [phone]  3) David [phone]     If I’m not able to reach my support people, or the above strategies don’t help, I can contact the following professionals, agencies, or hotlines:  1) Crisis Call Center ():  [phone] -OR- [phone]  2) Crisis Text Line ():  Text CARE TO 404379  3) [facility] Emergency Department  4) 988 crisis line    Ayo Fernandez M.D.      "

## 2025-03-11 NOTE — ED NOTES
Pt had a large bowel movement while in bed. Pt cleaned up and provided with a fresh diaper and a fresh morenita

## 2025-03-11 NOTE — CONSULTS
"PSYCHIATRIC FOLLOW-UP:(established)  *Reason for admission:       SI, weakness   *Legal Hold Status:  extended          Chart reviewed.         *HPI:          Psychiatry last followed up 3/10/25, and at that time, he was continued on his current psychotropic regimen, monitoring was transitioned to telesitter, and collateral was obtained from patient's friend. Per sitter and nurse, he ate breakfast this morning, has been watching TV, no behavioral issues this morning or overnight.    Today patient reports good mood. No major changes from yesterday. He is looking forward to leaving the hospital and moving to [place] which is near where he grew up. Looking forward to spending time with friends Jaymie Hernandez, son, other friends in the area, taking his dog to the vet. Plan would be to  dog at his house on the way to [place]. No adverse effects noted with medications. He is planning to establish with primary care in [place].    Mood: good  SI/HI: denies  Sleep:\" pretty good\"  Appetite: good  Energy: pretty good, watching news  Guilt/despair: denies    Medical ROS (as pertinent):     Review of Systems   Constitutional:  Negative for chills and fever.   Respiratory:  Negative for cough and shortness of breath.    Cardiovascular:  Negative for chest pain.   Gastrointestinal:  Negative for diarrhea, nausea and vomiting.   Skin:  Negative for rash.   Neurological:  Negative for dizziness and headaches.       *Psychiatric Examination:  Vitals:    03/10/25 2329   BP: 136/84   Pulse: 85   Resp: 18   Temp:    SpO2: 93%     Appearance: appears stated age, fair grooming and hygiene, calm, cooperative, good eye contact, in hospital garb  Abnormal movements: none  Gait/posture: normal  Speech: normal volume, tone and rhythm  Though process: linear and goal oriented  Associations: no loose associations  Thought content: denies AVH, no delusions or paranoia elicited, does not appear to be responding to internal stimuli, " neither is internally preoccupied.   Judgement and Insight: fair/fair  Orientation:oriented to person, place, time and situation  Recent and Remote Memory: intact  Attention Span and Concentration: intact  Language: fluid   Fund of Knowledge: not tested   Mood and Affect:, euthymic, appropriate   SI/HI:denies any active or passive SI/HI      EKG:          Results for orders placed or performed during the hospital encounter of 02/10/25   EKG   Result Value Ref Range     Report           [facility] Emergency Dept.     Test Date:  2025-02-10  Pt Name:    BAILEE JACOBSON               Department: ER  MRN:        [id number]                      Room:       [facility]  Gender:     Male                         Technician: [id number]  :        1965                   Requested By:ER TRIAGE PROTOCOL  Order #:    [id number]                    Reading MD: MERISSA TORRES DO     Measurements  Intervals                                Axis  Rate:       70                           P:          61  TN:         169                          QRS:        60  QRSD:       91                           T:          70  QT:         420  QTc:        454     Interpretive Statements  Sinus rhythm  Borderline low voltage, extremity leads  Compared to ECG 2025 12:50:26  No significant changes  Electronically Signed On 02- 16:26:09 PST by MERISSA TORRES DO         *Imaging:  head CT from 25: age related central and cortical atrophy       EEG:  none available       *Labs personally reviewed:   Recent Results (from the past 24 hours)   POCT glucose device results    Collection Time: 03/10/25  3:49 PM   Result Value Ref Range    POC Glucose, Blood 189 (H) 65 - 99 mg/dL       Current medications:  Current Facility-Administered Medications   Medication Dose Route Frequency Provider Last Rate Last Admin    tamsulosin (Flomax) capsule 0.4 mg  0.4 mg Oral AFTER BREAKFAST Sukhwinder Whitley   0.4 mg at 03/10/25 0808     cephALEXin (Keflex) capsule 500 mg  500 mg Oral Q6HRS Sukhwinder D Gutierrez   500 mg at 03/11/25 0647    glipiZIDE (Glucotrol) tablet 10 mg  10 mg Oral QAM AC Bong Hale M.D.   10 mg at 03/11/25 0647    melatonin tablet 10 mg  10 mg Oral Nightly Bong Hale M.D.   10 mg at 03/10/25 2327    methylphenidate (Ritalin) tablet 5 mg  5 mg Oral QDAY with Breakfast Bong Hale M.D.   5 mg at 03/11/25 0646    midodrine (Proamatine) tablet 5 mg  5 mg Oral TID WITH MEALS Bong Hale M.D.   5 mg at 03/11/25 0722    SITagliptin (Januvia) tablet 100 mg  100 mg Oral DAILY Bong Hale M.D.   100 mg at 03/11/25 0647    pioglitazone (Actos) tablet 30 mg  30 mg Oral DAILY Bong Hale M.D.   30 mg at 03/11/25 0647    rivaroxaban (Xarelto) tablet 10 mg  10 mg Oral DAILY AT 1800 Bong Hale M.D.   10 mg at 03/10/25 1715    acetaminophen (Tylenol) tablet 650 mg  650 mg Oral Q6HRS PRN Bong Hale M.D.   650 mg at 03/09/25 1449    senna-docusate (Pericolace Or Senokot S) 8.6-50 MG per tablet 2 Tablet  2 Tablet Oral Q EVENING Bong Hale M.D.   2 Tablet at 03/09/25 1743    And    polyethylene glycol/lytes (Miralax) Packet 1 Packet  1 Packet Oral QDAY PRN Bong Hale M.D.        gabapentin (Neurontin) capsule 300 mg  300 mg Oral TID Bong Hale M.D.   300 mg at 03/11/25 0646    venlafaxine XR (Effexor XR) capsule 225 mg  225 mg Oral DAILY Niki Sage M.D.   225 mg at 03/11/25 0647    vitamin D3 (Cholecalciferol) tablet 1,000 Units  1,000 Units Oral DAILY Niki Sage M.D.   1,000 Units at 03/10/25 0618     Current Outpatient Medications   Medication Sig Dispense Refill    methylphenidate (RITALIN) 5 MG Tab Take 1 Tablet by mouth every morning with breakfast for 30 days. 30 Tablet 0    gabapentin (NEURONTIN) 300 MG Cap Take 2 Capsules by mouth 3 times a day. 540 Capsule 0    glipiZIDE (GLUCOTROL) 10 MG Tab Take 1 Tablet by mouth every morning before breakfast. 100 Tablet 1    omeprazole  (PRILOSEC) 40 MG delayed-release capsule Take 1 Capsule by mouth every day for 200 days. 100 Capsule 1    pioglitazone (ACTOS) 30 MG Tab Take 1 Tablet by mouth every day. 100 Tablet 1    SITagliptin (JANUVIA) 100 MG Tab Take 1 Tablet by mouth every day. 100 Tablet 1    venlafaxine (EFFEXOR) 75 MG Tab Take 1 Tablet by mouth every day. 100 Tablet 1    Calcium Polycarbophil (FIBER) 625 MG Tab Take 1 Tablet by mouth 3 times a day with meals. (Patient not taking: Reported on 3/9/2025) 300 Tablet 0    lidocaine (ASPERFLEX) 4 % Patch Place 2 Patches on the skin every 24 hours. wear for 12 hours and remove for 12 hours (Patient not taking: Reported on 3/9/2025) 200 Patch 0    Melatonin 10 MG Tab Take 1 tablet by mouth every evening. 100 Tablet 0    methocarbamol (ROBAXIN) 750 MG Tab Take 1 Tablet by mouth 4 times a day. 360 Tablet 0    meloxicam (MOBIC) 15 MG tablet Take 1 Tablet by mouth every day. 100 Tablet 0    midodrine (PROAMATINE) 5 MG Tab Take 1 Tablet by mouth 3 times a day with meals. 270 Tablet 0    vitamin D (CHOLECALCIFEROL) 1000 UNIT Tab Take 1 Tablet by mouth every day. 100 Tablet 0    Continuous Glucose Sensor (FREESTYLE MAITE 14 DAY SENSOR) Misc 1 Each every 14 days. 6 Each 3       Assessment:  Patient continues to report improved mood. Reports that he felt suicidal at home after multiple falls but denies suicide attempt, preparation or gestures. Patient denies SI/HI/AVH for past several days. No psychotic thought content. Appetite, energy, and sleep improved. Denies guilt or despair. He is future oriented, looking forward to taking dog to vet, spending time with family and friends in [place].. Identifies sons and dog as reasons to live. If he is discharged, he would like to be picked up from the hospital by his friend David who lives in [place] and welcomes the patient to stay in his home. [place] is where patient is moving to and where one of his sons live. Friend David corroborated patient's story  and denied safety concerns; he endorsed having guns at home but they will be locked in a safe; friend says that patient is welcome to live with him until he obtains housing in the area. Patient denies adverse medication effects. Patient's friend David will help him manage medications. Discussed obtaining hearing aids in outpatient setting as this may help with social connection. Safety plan completed appropriately. Legal hold discontinued as patient determined to not be in acute danger to self.    Dx:  Major depressive disorder, recurrent  R/o cluster B traits     Medical :  Principal Problem:    Suicidal ideation (POA: Yes)  Active Problems:    Urinary retention (POA: Yes)    Type 2 diabetes mellitus (HCC) (POA: Yes)    UTI (urinary tract infection) (POA: Yes)    Frequent falls (POA: Yes)    Orthostatic hypotension (POA: Yes)    Chronic midline back pain (POA: Yes)  Resolved Problems:    * No resolved hospital problems. *    Plan:  1- Legal hold: discontinued 3/11/25  2- Psychotropic medications  Continue Effexor 225mg PO daily for mood (does not need prescription upon discharge, as he has supply at home)  Continue Ritalin 5mg PO daily for mood  (does not need prescription upon discharge, as he has supply at home)  3-Safety plan completed, copy in chart  4- Labs /reviewed:  5- EKG /reviewed  6- Old records /reviewed/summarized  7- Collateral obtained: friend David  8- Discussed the case with: Dr Waller  9- Psychiatry will sign off     Thank you for the consult.

## 2025-03-11 NOTE — ED NOTES
Pt A&Ox4, updated on POC. Pt provided with urinal and emptied with output 800 ml. Sitter remains in LOS for 1:1 obs for legal hold

## 2025-03-11 NOTE — DISCHARGE SUMMARY
ED Observation Discharge Summary    Patient:Christoph Taylor  Patient : 1965  Patient MRN: [id number]  Patient PCP: DIANA Tariq    Admit Date: 3/8/2025  Discharge Date and Time: 25 12:43 PM  Discharge Diagnosis: Suicidal ideation, pansensitive E. coli urinary tract infection, generalized weakness, transient urinary retention  Discharge Attending: Min Waller M.D.  Discharge Service: ED Observation    ED Course  Christoph is a 59 y.o. male who was evaluated at [facility] with suicidal ideation. The patient reported that he was suicidal and plan to shoot himself.  He was placed on a legal hold.  He has been followed by psychiatry.  Today psychiatry lifted the patient's legal hold.     Laboratory data was collected.  Patient was noted to have a urinary tract infection and started on Keflex.  Urine culture returned positive today demonstrating pansensitive E. coli.  Will continue this x 7 days.  The patient was able to void in the emergency department while here.    The patient had a couple episodes of diarrhea but normal bowel movement today.  He has a benign exam.    Patient was admitted to the hospital with chronic back pain.  Recent MRI with unchanged findings.  Neurosurgery recommended nonoperative management.    Laboratory data was repeated on the day of discharge and was unremarkable.  The patient was feeling quite well.  He is able to ambulate.  He says that he is ready to go home.  He was able to contract for safety.  There is no indication for further observation or inpatient stay.  The patient was discharged to follow-up with primary doctor soon as possible.    Physical Exam  /84   Pulse 85   Temp 36.7 °C (98.1 °F)   Resp 18   Ht 1.829 m (6')   Wt 72.6 kg (160 lb)   SpO2 93%   BMI 21.70 kg/m² .    Constitutional: Awake and alert. Nontoxic  HENT:  Grossly normal  Eyes: Grossly normal  Neck: Normal range of motion  Cardiovascular: Normal heart rate   Thorax & Lungs: No  respiratory distress  Abdomen: Nontender  Skin:  No pathologic rash.   Extremities: Well perfused  Psychiatric: Affect normal    Discharge Exam:  /85   Pulse 78   Temp 36.7 °C (98 °F) (Temporal)   Resp 16   Ht 1.829 m (6')   Wt 72.6 kg (160 lb)   SpO2 95%   BMI 21.70 kg/m² .    Constitutional: Awake and alert. Nontoxic  HENT:  Grossly normal  Eyes: Grossly normal  Neck: Normal range of motion  Cardiovascular: Normal heart rate   Thorax & Lungs: No respiratory distress  Abdomen: Nontender  Skin:  No pathologic rash.   Extremities: Well perfused  Psychiatric: Affect normal    Labs  Results for orders placed or performed during the hospital encounter of 03/08/25   POC BREATHALIZER    Collection Time: 03/08/25  9:15 PM   Result Value Ref Range    POC Breathalizer 0.00 0.00 - 0.01 Percent   CBC WITH DIFFERENTIAL    Collection Time: 03/08/25  9:35 PM   Result Value Ref Range    WBC 4.6 (L) 4.8 - 10.8 K/uL    RBC 4.02 (L) 4.70 - 6.10 M/uL    Hemoglobin 10.2 (L) 14.0 - 18.0 g/dL    Hematocrit 31.3 (L) 42.0 - 52.0 %    MCV 77.9 (L) 81.4 - 97.8 fL    MCH 25.4 (L) 27.0 - 33.0 pg    MCHC 32.6 32.3 - 36.5 g/dL    RDW 40.9 35.9 - 50.0 fL    Platelet Count 202 164 - 446 K/uL    MPV 8.3 (L) 9.0 - 12.9 fL    Neutrophils-Polys 68.10 44.00 - 72.00 %    Lymphocytes 22.00 22.00 - 41.00 %    Monocytes 7.80 0.00 - 13.40 %    Eosinophils 1.70 0.00 - 6.90 %    Basophils 0.20 0.00 - 1.80 %    Immature Granulocytes 0.20 0.00 - 0.90 %    Nucleated RBC 0.00 0.00 - 0.20 /100 WBC    Neutrophils (Absolute) 3.15 1.82 - 7.42 K/uL    Lymphs (Absolute) 1.02 1.00 - 4.80 K/uL    Monos (Absolute) 0.36 0.00 - 0.85 K/uL    Eos (Absolute) 0.08 0.00 - 0.51 K/uL    Baso (Absolute) 0.01 0.00 - 0.12 K/uL    Immature Granulocytes (abs) 0.01 0.00 - 0.11 K/uL    NRBC (Absolute) 0.00 K/uL   COMP METABOLIC PANEL    Collection Time: 03/08/25  9:35 PM   Result Value Ref Range    Sodium 140 135 - 145 mmol/L    Potassium 3.9 3.6 - 5.5 mmol/L    Chloride 102  96 - 112 mmol/L    Co2 29 20 - 33 mmol/L    Anion Gap 9.0 7.0 - 16.0    Glucose 188 (H) 65 - 99 mg/dL    Bun 20 8 - 22 mg/dL    Creatinine 0.65 0.50 - 1.40 mg/dL    Calcium 8.8 8.5 - 10.5 mg/dL    Correct Calcium 9.2 8.5 - 10.5 mg/dL    AST(SGOT) 15 12 - 45 U/L    ALT(SGPT) 12 2 - 50 U/L    Alkaline Phosphatase 79 30 - 99 U/L    Total Bilirubin 0.2 0.1 - 1.5 mg/dL    Albumin 3.5 3.2 - 4.9 g/dL    Total Protein 6.2 6.0 - 8.2 g/dL    Globulin 2.7 1.9 - 3.5 g/dL    A-G Ratio 1.3 g/dL   DIAGNOSTIC ALCOHOL    Collection Time: 03/08/25  9:35 PM   Result Value Ref Range    Diagnostic Alcohol <10.1 <10.1 mg/dL   ESTIMATED GFR    Collection Time: 03/08/25  9:35 PM   Result Value Ref Range    GFR (CKD-EPI) 107 >60 mL/min/1.73 m 2   URINE DRUG SCREEN    Collection Time: 03/09/25  5:00 AM   Result Value Ref Range    Amphetamines Urine Negative Negative    Barbiturates Negative Negative    Benzodiazepines Negative Negative    Cocaine Metabolite Negative Negative    Fentanyl, Urine Negative Negative    Methadone Negative Negative    Opiates Negative Negative    Oxycodone Positive (A) Negative    Phencyclidine -Pcp Negative Negative    Propoxyphene Negative Negative    Cannabinoid Metab Negative Negative   Urinalysis, Culture if Indicated    Collection Time: 03/09/25  5:00 AM    Specimen: Urine   Result Value Ref Range    Color Yellow     Character Cloudy (A)     Specific Gravity 1.014 <1.035    Ph 6.0 5.0 - 8.0    Glucose Negative Negative mg/dL    Ketones Negative Negative mg/dL    Protein 300 (A) Negative mg/dL    Bilirubin Negative Negative    Urobilinogen, Urine 0.2 <=1.0 EU/dL    Nitrite Negative Negative    Leukocyte Esterase Moderate (A) Negative    Occult Blood Small (A) Negative    Micro Urine Req Microscopic    URINE MICROSCOPIC (W/UA)    Collection Time: 03/09/25  5:00 AM   Result Value Ref Range    WBC  (A) /hpf    RBC 3-5 (A) 0 - 2 /hpf    Bacteria Many (A) None /hpf    Epithelial Cells 0-2 0 - 5 /hpf    Urine  Casts 0-2 0 - 2 /lpf   URINE CULTURE(NEW)    Collection Time: 03/09/25  5:00 AM    Specimen: Urine   Result Value Ref Range    Significant Indicator POS (POS)     Source UR     Site -     Culture Result - (A)     Culture Result Escherichia coli  >100,000 cfu/mL   (A)        Susceptibility    Escherichia coli - JORDON     Ampicillin/sulbactam  Sensitive mcg/mL     Ampicillin  Sensitive mcg/mL     Amoxicillin/Clavulanic Acid  Sensitive mcg/mL     Ceftriaxone  Sensitive mcg/mL     Cefazolin*  Sensitive mcg/mL      * Breakpoints when Cefazolin is used for therapy of infections  other than uncomplicated UTIs due to Enterobacterales are as  follows:  JORDON and Interpretation:  <=2 S  4 I  >=8 R       Ciprofloxacin  Sensitive mcg/mL     Cefepime  Sensitive mcg/mL     Cefuroxime  Sensitive mcg/mL     Nitrofurantoin  Sensitive mcg/mL     Gentamicin  Sensitive mcg/mL     Levofloxacin  Sensitive mcg/mL     Meropenem  Sensitive mcg/mL     Meropenem/Vaborbactam  Sensitive mcg/mL     Minocycline  Sensitive mcg/mL     Pip/Tazobactam  Sensitive mcg/mL     Trimeth/Sulfa  Sensitive mcg/mL     Tigecycline  Sensitive mcg/mL     Tobramycin  Sensitive mcg/mL   POCT glucose device results    Collection Time: 03/10/25  3:49 PM   Result Value Ref Range    POC Glucose, Blood 189 (H) 65 - 99 mg/dL   CBC WITH DIFFERENTIAL    Collection Time: 03/11/25  9:13 AM   Result Value Ref Range    WBC 5.5 4.8 - 10.8 K/uL    RBC 4.04 (L) 4.70 - 6.10 M/uL    Hemoglobin 10.2 (L) 14.0 - 18.0 g/dL    Hematocrit 32.5 (L) 42.0 - 52.0 %    MCV 80.4 (L) 81.4 - 97.8 fL    MCH 25.2 (L) 27.0 - 33.0 pg    MCHC 31.4 (L) 32.3 - 36.5 g/dL    RDW 43.5 35.9 - 50.0 fL    Platelet Count 198 164 - 446 K/uL    MPV 8.7 (L) 9.0 - 12.9 fL    Neutrophils-Polys 74.00 (H) 44.00 - 72.00 %    Lymphocytes 17.00 (L) 22.00 - 41.00 %    Monocytes 5.90 0.00 - 13.40 %    Eosinophils 2.70 0.00 - 6.90 %    Basophils 0.20 0.00 - 1.80 %    Immature Granulocytes 0.20 0.00 - 0.90 %    Nucleated RBC  0.00 0.00 - 0.20 /100 WBC    Neutrophils (Absolute) 4.05 1.82 - 7.42 K/uL    Lymphs (Absolute) 0.93 (L) 1.00 - 4.80 K/uL    Monos (Absolute) 0.32 0.00 - 0.85 K/uL    Eos (Absolute) 0.15 0.00 - 0.51 K/uL    Baso (Absolute) 0.01 0.00 - 0.12 K/uL    Immature Granulocytes (abs) 0.01 0.00 - 0.11 K/uL    NRBC (Absolute) 0.00 K/uL   BASIC METABOLIC PANEL    Collection Time: 03/11/25  9:13 AM   Result Value Ref Range    Sodium 137 135 - 145 mmol/L    Potassium 4.2 3.6 - 5.5 mmol/L    Chloride 103 96 - 112 mmol/L    Co2 25 20 - 33 mmol/L    Glucose 236 (H) 65 - 99 mg/dL    Bun 31 (H) 8 - 22 mg/dL    Creatinine 0.84 0.50 - 1.40 mg/dL    Calcium 8.6 8.5 - 10.5 mg/dL    Anion Gap 9.0 7.0 - 16.0   PROCALCITONIN    Collection Time: 03/11/25  9:13 AM   Result Value Ref Range    Procalcitonin 0.08 <0.25 ng/mL   ESTIMATED GFR    Collection Time: 03/11/25  9:13 AM   Result Value Ref Range    GFR (CKD-EPI) 99 >60 mL/min/1.73 m 2   POCT glucose device results    Collection Time: 03/11/25  9:21 AM   Result Value Ref Range    POC Glucose, Blood 224 (H) 65 - 99 mg/dL       Radiology  DX-FOOT-COMPLETE 3+ LEFT   Final Result         1.  No acute traumatic bony injury.   2.  Severe degenerative changes of the midfoot   3.  Pes planus          Medications:   Discharge Medication List as of 3/11/2025 11:58 AM        START taking these medications    Details   cephALEXin (KEFLEX) 500 MG Cap Take 1 Capsule by mouth 4 times a day for 6 days., Disp-24 Capsule, R-0, Normal             Upon Reevaluation, the patient's condition has: Improved; and will be discharged.        Total time spent on this ED Observation discharge encounter is < 30 Minutes    Electronically signed by: Min Waller M.D., 3/11/2025 1:43 PM

## 2025-03-11 NOTE — ED NOTES
Pt assisted to the bedside commode with max assist. Pt had a bowel movement. Pt voided a small amount

## 2025-03-11 NOTE — ED NOTES
Bedside report received from Gifty CASPER, assumed care of patient.  POC discussed with patient. Call light within reach, all needs addressed at this time.         Fall risk interventions in place: Not Applicable (all applicable per [place] Fall risk assessment)   Continuous monitoring: Cardiac Leads, Pulse Ox, or Blood Pressure  IVF/IV medications: Not Applicable   Oxygen: Room Air  Bedside sitter: 1:1   Isolation: Not Applicable

## 2025-03-11 NOTE — ED NOTES
Report received from Ronnie CASPER. For discharge, pending friend to pick him up. Patient denies SI, L2K discontinued.

## 2025-03-21 DIAGNOSIS — E11.3593 PROLIFERATIVE DIABETIC RETINOPATHY OF BOTH EYES WITHOUT MACULAR EDEMA ASSOCIATED WITH TYPE 2 DIABETES MELLITUS (HCC): ICD-10-CM

## 2025-03-27 ENCOUNTER — TELEPHONE (OUTPATIENT)
Dept: HEALTH INFORMATION MANAGEMENT | Facility: OTHER | Age: 60
End: 2025-03-27
Payer: COMMERCIAL

## 2025-05-23 NOTE — CARE PLAN
Problem: Adult Inpatient Plan of Care  Goal: Plan of Care Review  Outcome: Progressing  Goal: Patient-Specific Goal (Individualized)  Outcome: Progressing  Goal: Absence of Hospital-Acquired Illness or Injury  Outcome: Progressing  Intervention: Identify and Manage Fall Risk  Recent Flowsheet Documentation  Taken 5/23/2025 0407 by Cony Gipson, RN  Safety Promotion/Fall Prevention:   safety round/check completed   room organization consistent   nonskid shoes/slippers when out of bed   fall prevention program maintained   clutter free environment maintained   assistive device/personal items within reach   activity supervised  Taken 5/23/2025 0215 by Cony Gipson, RN  Safety Promotion/Fall Prevention:   safety round/check completed   room organization consistent   nonskid shoes/slippers when out of bed   fall prevention program maintained   clutter free environment maintained   assistive device/personal items within reach   activity supervised  Taken 5/23/2025 0030 by Cony Gipson, RN  Safety Promotion/Fall Prevention:   safety round/check completed   room organization consistent   nonskid shoes/slippers when out of bed   fall prevention program maintained   clutter free environment maintained   assistive device/personal items within reach   activity supervised  Taken 5/22/2025 2230 by Cony Gipson, RN  Safety Promotion/Fall Prevention:   safety round/check completed   room organization consistent   nonskid shoes/slippers when out of bed   fall prevention program maintained   assistive device/personal items within reach   activity supervised   clutter free environment maintained  Taken 5/22/2025 2017 by Cony Gipson, RN  Safety Promotion/Fall Prevention:   safety round/check completed   room organization consistent   nonskid shoes/slippers when out of bed   fall prevention program maintained   clutter free environment maintained   assistive device/personal items within reach   activity supervised  Intervention:  The patient is Stable - Low risk of patient condition declining or worsening    Shift Goals  Clinical Goals: Pts pain will remain < 5 below shift  Patient Goals: Rest and comfort  Family Goals: KATHERINE    Progress made toward(s) clinical / shift goals:  Pt remained free from falls through the use of the call light and pain was managed with distraction, rest, and comfort measures      Problem: Safety  Goal: Will remain free from falls  Outcome: Progressing     Problem: Pain Management  Goal: Pain level will decrease to patient's comfort goal  Outcome: Progressing     Problem: Infection  Goal: Will remain free from infection  Outcome: Progressing       Patient is not progressing towards the following goals:       Prevent Skin Injury  Recent Flowsheet Documentation  Taken 5/23/2025 0407 by Cony Gipson RN  Body Position:   turned   supine   heels elevated  Skin Protection:   incontinence pads utilized   silicone foam dressing in place   transparent dressing maintained  Taken 5/23/2025 0215 by Cony Gipson RN  Body Position:   turned   left  Skin Protection:   incontinence pads utilized   protective footwear used   silicone foam dressing in place  Taken 5/23/2025 0030 by Cony Gipson RN  Body Position:   turned   right  Skin Protection:   incontinence pads utilized   silicone foam dressing in place   protective footwear used   transparent dressing maintained  Taken 5/22/2025 2230 by Cony Gipson RN  Body Position:   turned   supine  Skin Protection:   incontinence pads utilized   protective footwear used   silicone foam dressing in place   transparent dressing maintained  Taken 5/22/2025 2017 by Cony Gipson RN  Body Position:   turned   left   heels elevated   legs elevated  Skin Protection:   incontinence pads utilized   drying agents applied   protective footwear used   silicone foam dressing in place   transparent dressing maintained  Intervention: Prevent and Manage VTE (Venous Thromboembolism) Risk  Recent Flowsheet Documentation  Taken 5/22/2025 2017 by Cony Gipson RN  VTE Prevention/Management: (see mar) other (see comments)  Intervention: Prevent Infection  Recent Flowsheet Documentation  Taken 5/23/2025 0407 by Cony Gipson RN  Infection Prevention:   rest/sleep promoted   single patient room provided   hand hygiene promoted   environmental surveillance performed  Taken 5/23/2025 0215 by Cony Gipson RN  Infection Prevention:   rest/sleep promoted   single patient room provided   hand hygiene promoted   environmental surveillance performed  Taken 5/23/2025 0030 by Cony Gipson RN  Infection Prevention:   rest/sleep promoted   single patient room provided   hand hygiene promoted   environmental  surveillance performed  Taken 5/22/2025 2017 by Cony Gipson RN  Infection Prevention:   rest/sleep promoted   single patient room provided   hand hygiene promoted   environmental surveillance performed  Goal: Optimal Comfort and Wellbeing  Outcome: Progressing  Intervention: Monitor Pain and Promote Comfort  Recent Flowsheet Documentation  Taken 5/22/2025 2017 by Cony Gipson RN  Pain Management Interventions:   pain medication given   position adjusted  Intervention: Provide Person-Centered Care  Recent Flowsheet Documentation  Taken 5/22/2025 2017 by Cony Gipson RN  Trust Relationship/Rapport:   care explained   thoughts/feelings acknowledged  Goal: Readiness for Transition of Care  Outcome: Progressing     Problem: Comorbidity Management  Goal: Maintenance of Asthma Control  Outcome: Progressing  Goal: Blood Pressure in Desired Range  Outcome: Progressing  Goal: Maintenance of Osteoarthritis Symptom Control  Outcome: Progressing  Intervention: Maintain Osteoarthritis Symptom Control  Recent Flowsheet Documentation  Taken 5/23/2025 0407 by Cony Gipson RN  Activity Management: activity encouraged  Taken 5/23/2025 0215 by Cony Gipson RN  Activity Management: activity encouraged  Taken 5/23/2025 0030 by Cony Gipson RN  Activity Management: activity minimized  Taken 5/22/2025 2230 by Cony Gipson RN  Activity Management: activity encouraged  Taken 5/22/2025 2017 by Cony Gipson RN  Activity Management: activity encouraged     Problem: Skin Injury Risk Increased  Goal: Skin Health and Integrity  Outcome: Progressing  Intervention: Optimize Skin Protection  Recent Flowsheet Documentation  Taken 5/23/2025 0407 by Cony Gipson RN  Activity Management: activity encouraged  Pressure Reduction Techniques:   weight shift assistance provided   frequent weight shift encouraged   heels elevated off bed   positioned off wounds   pressure points protected  Head of Bed (HOB) Positioning: HOB at 30-45  degrees  Pressure Reduction Devices:   pressure-redistributing mattress utilized   positioning supports utilized   heel offloading device utilized   foam padding utilized  Skin Protection:   incontinence pads utilized   silicone foam dressing in place   transparent dressing maintained  Taken 5/23/2025 0215 by Cony Gipson, CHRISTIAN  Activity Management: activity encouraged  Pressure Reduction Techniques:   weight shift assistance provided   frequent weight shift encouraged   heels elevated off bed   positioned off wounds   pressure points protected  Head of Bed (HOB) Positioning: HOB at 30-45 degrees  Pressure Reduction Devices:   pressure-redistributing mattress utilized   positioning supports utilized   heel offloading device utilized   foam padding utilized  Skin Protection:   incontinence pads utilized   protective footwear used   silicone foam dressing in place  Taken 5/23/2025 0030 by Cony Gipson, RN  Activity Management: activity minimized  Pressure Reduction Techniques:   weight shift assistance provided   frequent weight shift encouraged   heels elevated off bed   positioned off wounds   pressure points protected  Head of Bed (HOB) Positioning: HOB at 30-45 degrees  Pressure Reduction Devices:   pressure-redistributing mattress utilized   positioning supports utilized   heel offloading device utilized   foam padding utilized   feet on footrest/footstool  Skin Protection:   incontinence pads utilized   silicone foam dressing in place   protective footwear used   transparent dressing maintained  Taken 5/22/2025 2230 by Cony Gipson, RN  Activity Management: activity encouraged  Pressure Reduction Techniques:   weight shift assistance provided   frequent weight shift encouraged   heels elevated off bed   positioned off wounds   pressure points protected  Head of Bed (HOB) Positioning: HOB elevated  Pressure Reduction Devices:   positioning supports utilized   pressure-redistributing mattress utilized   heel  offloading device utilized   foam padding utilized  Skin Protection:   incontinence pads utilized   protective footwear used   silicone foam dressing in place   transparent dressing maintained  Taken 5/22/2025 2017 by Cony Gipson RN  Activity Management: activity encouraged  Pressure Reduction Techniques:   weight shift assistance provided   frequent weight shift encouraged   heels elevated off bed   pressure points protected   positioned off wounds  Head of Bed (HOB) Positioning: HOB at 45 degrees  Pressure Reduction Devices:   pressure-redistributing mattress utilized   positioning supports utilized   heel offloading device utilized   foam padding utilized   chair cushion utilized  Skin Protection:   incontinence pads utilized   drying agents applied   protective footwear used   silicone foam dressing in place   transparent dressing maintained     Problem: Fall Injury Risk  Goal: Absence of Fall and Fall-Related Injury  Outcome: Progressing  Intervention: Promote Injury-Free Environment  Recent Flowsheet Documentation  Taken 5/23/2025 0407 by Cony Gipson, RN  Safety Promotion/Fall Prevention:   safety round/check completed   room organization consistent   nonskid shoes/slippers when out of bed   fall prevention program maintained   clutter free environment maintained   assistive device/personal items within reach   activity supervised  Taken 5/23/2025 0215 by Cony Gipson, RN  Safety Promotion/Fall Prevention:   safety round/check completed   room organization consistent   nonskid shoes/slippers when out of bed   fall prevention program maintained   clutter free environment maintained   assistive device/personal items within reach   activity supervised  Taken 5/23/2025 0030 by Cony Gipson, RN  Safety Promotion/Fall Prevention:   safety round/check completed   room organization consistent   nonskid shoes/slippers when out of bed   fall prevention program maintained   clutter free environment maintained    assistive device/personal items within reach   activity supervised  Taken 5/22/2025 2230 by Cony Gipson, RN  Safety Promotion/Fall Prevention:   safety round/check completed   room organization consistent   nonskid shoes/slippers when out of bed   fall prevention program maintained   assistive device/personal items within reach   activity supervised   clutter free environment maintained  Taken 5/22/2025 2017 by Cony Gipson, RN  Safety Promotion/Fall Prevention:   safety round/check completed   room organization consistent   nonskid shoes/slippers when out of bed   fall prevention program maintained   clutter free environment maintained   assistive device/personal items within reach   activity supervised   Goal Outcome Evaluation:

## 2025-05-29 NOTE — PROGRESS NOTES
[facility] Medicine Daily Progress Note    Date of Service  2/26/2025    Chief Complaint  Christoph Taylor is a 59 y.o. male admitted 2/10/2025 with   Chief Complaint   Patient presents with    T-5000 FALL    Back Pain         Hospital Course  Patient is a 59-year-old male with extensive past medical history of poorly controlled type 2 diabetes mellitus, failure to thrive, and prior suicidal ideations secondary to major depressive disorder as well as his chronic back pain status post multiple laminectomies and fractures who presents due to recurrent falls as well as progressive worsening back pain. Patient was initially held in the emergency department due to suicidal ideation, did not meet any admission criteria, and had progressive worsening of his bilateral lower extremity. Patient reports he chronically has fecal incontinence for the past several months, no other new neurologic symptoms. However, patient does report new onset of acute urinary retention prompting ER provider to order stat MRI of thoracic and lumbar spine due to previous evidence of cord compression.     MRI of thoracic and lumbar showed chronic compression deformity at T12.  Postsurgical and degenerative changes.  No acute fracture.  No significant interval change.    Patient had persistent urinary retention requiring Crowley catheter placement.  Started on tamsulosin.    Patient has difficulty taking care of himself.  He has difficulty managing his diabetes.  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  Also states decreased range of motion and flexibility, dexterity of his bilateral hands which is previous cervical surgery was supposed to fix.  States he has a lot of trouble with glucometer, lancets, using insulin pens.  He was previously switched to oral hypoglycemic agents but still was not under good control and he would still like to be able to check his blood sugar because it dropped severely low previously while he  The patient's goals for the shift include rest    The clinical goals for the shift include vitals wnl      Problem: Infection  Goal: Improvement in s/sx of infection  Outcome: Progressing     Problem: Pain - Adult  Goal: Verbalizes/displays adequate comfort level or baseline comfort level  Outcome: Progressing      was at [employer].  He apparently started having convulsions and they thought a cardiac arrest.  He also has poor vision making very difficult to do manage his meds and glucometer.  Previously saw optometry and diagnosed with diabetic retinopathy and cataracts.    Was placed on legal hold for suicidal ideations.    Interval Problem Update  No acute events overnight.  Patient feels well.  Bladder scans 's, continue to monitor. Can straight cath for PVR >400cc.  Had formed bowel movement yesterday.  Encouraged PO intake to stay hydrated to improve orthostatic hypotension.  On legal hold. Psychiatry following.  Patient is medically cleared for discharge to inpatient psychiatric facility.      I have discussed this patient's plan of care and discharge plan at IDT rounds today with Case Management, Nursing, Nursing leadership, and other members of the IDT team.    Consultants/Specialty  psychiatry    Code Status  Full Code    Disposition  Medically Cleared  I have placed the appropriate orders for post-discharge needs.    Review of Systems  Review of Systems   Gastrointestinal:  Positive for diarrhea. Negative for constipation.   Musculoskeletal:  Positive for back pain and joint pain.   Neurological:  Negative for dizziness, loss of consciousness and weakness.        Physical Exam  Temp:  [36.7 °C (98.1 °F)-36.8 °C (98.2 °F)] 36.7 °C (98.1 °F)  Pulse:  [85-90] 85  Resp:  [17] 17  BP: (127-130)/(78-83) 130/83  SpO2:  [93 %-96 %] 96 %    Physical Exam  Vitals and nursing note reviewed. Exam conducted with a chaperone present (Sitter at bedside).   Constitutional:       General: He is not in acute distress.     Appearance: Normal appearance. He is not ill-appearing, toxic-appearing or diaphoretic.   HENT:      Head: Normocephalic.      Ears:      Comments: +Presbycusis     Nose: Nose normal.      Mouth/Throat:      Mouth: Mucous membranes are moist.   Eyes:      General: No scleral icterus.     Conjunctiva/sclera:  Conjunctivae normal.   Pulmonary:      Effort: Pulmonary effort is normal. No respiratory distress.   Genitourinary:     Comments: No jiménez  Skin:     General: Skin is warm and dry.   Neurological:      Mental Status: He is alert.      Comments: Appropriately conversant, moves all extremities   Psychiatric:         Attention and Perception: Attention and perception normal.         Mood and Affect: Mood normal. Affect is blunt.         Speech: Speech normal.         Behavior: Behavior normal. Behavior is cooperative.         Thought Content: Thought content normal.         Cognition and Memory: Cognition and memory normal.         Judgment: Judgment normal.       Fluids    Intake/Output Summary (Last 24 hours) at 2/26/2025 1248  Last data filed at 2/25/2025 2130  Gross per 24 hour   Intake 400 ml   Output 275 ml   Net 125 ml      Laboratory                            Imaging  DX-SHOULDER 2+ RIGHT   Final Result      1.  No evidence of right shoulder fracture or dislocation.      2.  Moderate osteoarthritic changes of the right AC joint      [id number] WITH   Final Result      1.  Unremarkable orbits.   2.  No facial soft tissue swelling or mass.   3.  Paranasal sinuses are clear.      MR-THORACIC SPINE-W/O   Final Result      1.  Moderate chronic compression deformity at T12.   2.  No acute fracture.   3.  Multifocal degenerative disease as described above.   4.  There has been no significant interval change.      MR-LUMBAR SPINE-W/O   Final Result      1.  Postsurgical and degenerative changes as described above.   2.  Moderate chronic compression deformity at T12.   3.  No acute abnormality.   4.  There has been no significant interval change.      CT-LSPINE W/O PLUS RECONS   Final Result      1. No acute fracture or subluxation involving the lumbar spine.   2. Stable anterior wedge compression deformity of the superior endplate of T12.   3. Stable degenerative grade 1 anterolisthesis of L4 with respect to L3.    4. New postsurgical changes of posterior lateral interbody fusion at L4-5.   5. Horseshoe kidney.   6. Circumferential bladder wall thickening.      CT-TSPINE W/O PLUS RECONS   Final Result      Chronic appearing fractures without a definite acute fracture or change in alignment.      CT-CSPINE WITHOUT PLUS RECONS   Final Result      1. No acute osseous injury of the cervical spine.   2. Stable postsurgical changes of ACDF from C4 through C7.   3. Stable multilevel cervical spondylosis.      CT-HEAD W/O   Final Result      1. Age-related central and cortical atrophy.   2. No acute intracranial abnormality.                    Assessment/Plan  * Urinary retention- (present on admission)  Assessment & Plan  Improved  Patient with acute urinary retention after fall, given extensive back history concern for potential cord abnormality.    Unable to tolerate A1AT due to orthostasis  If worsening retention but improved orthostasis, will discuss with Urology if inpatient TURP is feasible  Bladder scan prn, straight cath for PVR>400cc    Chronic midline back pain- (present on admission)  Assessment & Plan  MRI demonstrates chronic compression fracture and degenerative changes - nonoperative management  Opiates contraindicated due to urinary retention, chronic pain, and mood DO  Multimodal regimen:  Increased gabapentin to 600 mg TID  Scheduled NSAID - HELD DURING STEROIDS  SNRI - venlafaxine  Scheduled Muscle relaxant - robaxin QID  Lidoderm topical therapy  Warm compress  Psychiatry consultation for CBD  Refractory -  5-day course of steroids 2/20-2/24    Orthostatic hypotension  Assessment & Plan  Recurrent  Exacerbated by high-dose tamsulosin, not improved with doxazosin  Holding A1AT and will assess UOP  Cosyntropin stimulation test negative 2/23 for AI  Orthostatic VS and IVF boluses as needed    Proliferative diabetic retinopathy of both eyes without macular edema associated with type 2 diabetes mellitus (HCC)-  (present on admission)  Assessment & Plan  Complains of worsening vision  Patient Previous saw Soraida Dianna, OD on 10/5/2024.  Exam is uploaded into the media tab.  At that time his right eye visual acuity was 20/40-2, left eye 20/150-1.  Referral was placed to [facility] for diabetic retinopathy and cataracts but patient has been unable to follow-up.  Patient complained of progressively worsening vision more acutely over the past few days.  He had a CT orbits on 2/16 which was unremarkable  Follow up with outpatient ophthalmologist    Frequent falls- (present on admission)  Assessment & Plan  Patient with frequent falls due to chronic and progressively worsening bilateral lower extremity weakness with multiple different spinal surgeries.  Patient does report chronic bowel incontinence and recently has developed urinary retention after previous fall with concern for potential cord compression/abnormality.  Lower extremity weakness 4+ out of 5, likely generalized, however asked by ER provider to admit for MRI evaluation  -MRI of thoracic and lumbar spine show chronic compression fracture at T12, degenerative changes.  No acute fracture  Neurosurgery has recommended for outpatient follow-up and possible epidural injections  - PT/OT recommend post-acute, not a candidate due to legal hold for SI    Suicidal ideation- (present on admission)  Assessment & Plan  Currently on legal hold for suicidal ideation due to his current health  Psychiatry consulted, extended legal hold  He has not contested, expect 1 week extension    Acute hypoxic respiratory failure (HCC)- (present on admission)  Assessment & Plan  Resolved    Uncontrolled type 2 diabetes mellitus with hyperglycemia (HCC)- (present on admission)  Assessment & Plan  A1c 9.8  Insulin discontinued due to difficulty managing with impaired dexterity / vision and risk of hypoglycemia  Initiate additional pharmacotherapy (SGLT2, GLP1, etc) after discharge        VTE prophylaxis: Lovenox
